# Patient Record
Sex: FEMALE | Race: BLACK OR AFRICAN AMERICAN | NOT HISPANIC OR LATINO | Employment: PART TIME | URBAN - NONMETROPOLITAN AREA
[De-identification: names, ages, dates, MRNs, and addresses within clinical notes are randomized per-mention and may not be internally consistent; named-entity substitution may affect disease eponyms.]

---

## 2017-01-05 ENCOUNTER — OFFICE VISIT (OUTPATIENT)
Dept: BARIATRICS/WEIGHT MGMT | Facility: CLINIC | Age: 67
End: 2017-01-05

## 2017-01-05 ENCOUNTER — NUTRITION (OUTPATIENT)
Dept: BARIATRICS/WEIGHT MGMT | Facility: HOSPITAL | Age: 67
End: 2017-01-05

## 2017-01-05 VITALS
TEMPERATURE: 98.6 F | SYSTOLIC BLOOD PRESSURE: 200 MMHG | OXYGEN SATURATION: 99 % | HEIGHT: 67 IN | RESPIRATION RATE: 20 BRPM | HEART RATE: 83 BPM | WEIGHT: 235.4 LBS | BODY MASS INDEX: 36.95 KG/M2 | DIASTOLIC BLOOD PRESSURE: 103 MMHG

## 2017-01-05 DIAGNOSIS — E55.9 VITAMIN D DEFICIENCY: ICD-10-CM

## 2017-01-05 DIAGNOSIS — Z98.84 HISTORY OF ROUX-EN-Y GASTRIC BYPASS: ICD-10-CM

## 2017-01-05 DIAGNOSIS — I10 HYPERTENSION, UNCONTROLLED: ICD-10-CM

## 2017-01-05 DIAGNOSIS — E66.01 MORBID OBESITY DUE TO EXCESS CALORIES (HCC): Primary | ICD-10-CM

## 2017-01-05 PROCEDURE — 99213 OFFICE O/P EST LOW 20 MIN: CPT | Performed by: NURSE PRACTITIONER

## 2017-01-05 PROCEDURE — 97803 MED NUTRITION INDIV SUBSEQ: CPT

## 2017-01-05 RX ORDER — MULTIVIT-MINERALS/FOLIC ACID 150 MCG
TABLET,CHEWABLE ORAL DAILY
Status: ON HOLD | COMMUNITY
End: 2020-02-26

## 2017-01-05 RX ORDER — DULOXETIN HYDROCHLORIDE 30 MG/1
30 CAPSULE, DELAYED RELEASE ORAL DAILY
COMMUNITY
End: 2017-10-12 | Stop reason: ALTCHOICE

## 2017-01-05 RX ORDER — CYANOCOBALAMIN 1000 UG/ML
1000 INJECTION, SOLUTION INTRAMUSCULAR; SUBCUTANEOUS
COMMUNITY

## 2017-01-05 NOTE — PATIENT INSTRUCTIONS
Patient is not getting enough protein per day and is not eating enough per day.   Will see the dietician today do discuss goals to remedy this.   See back in 6 weeks for recheck.   Vitamin labs will be due in August of 2017. However, Vitamin D level is due now. Order provided.   Goals: increase protein to 65 grams per day; increase meals to 4-5 small meals per day.   Patient will follow up in 6 weeks and will call the office if needs anything prior to that appointment.   Discuss uncontrolled B/P with PCP later this week.

## 2017-01-05 NOTE — PROGRESS NOTES
"Subjective   Veronica Stewart is a 66 y.o. female.     History of Present Illness Veronica Stewart is post RYGB bariatric surgery from 2002. Patient is able to get 64 ounces of water in per day. Patient is getting less than 30 grams of protein in per day. She is only eating once to twice per day. She will occasionally drink a protein shake. Patient is taking a multiple vitamin per day. Patient is taking Calcium with vitamin D supplement 2-3 times daily. Patient is getting Vitamin B12 injections monthly. Patient does not have any complaints of reflux, dysphagia, N/V, or abdominal pain. She is having some \"bloating\" when she eats. She has not tried any Gas-X to see if that would help.  Patient is on PPI and carafate due to to marginal ulcer in remote past.  Her blood pressure is elevated again today. She is due to see her PCP in a few days and will discuss this with her. She has refused to go to the ER for evaluation of this and states that she has to go to work after leaving here.     The following portions of the patient's history were reviewed and updated as appropriate: allergies, current medications, past family history, past medical history, past social history, past surgical history and problem list.    Review of Systems   Constitutional: Positive for fatigue. Negative for activity change and appetite change.   HENT: Negative.    Eyes: Positive for visual disturbance.        Blurred vision (mild); patient states chronic   Respiratory: Negative.  Negative for apnea, cough, chest tightness and shortness of breath.    Cardiovascular: Negative.  Negative for chest pain, palpitations and leg swelling.   Gastrointestinal: Negative.  Negative for abdominal pain, anal bleeding, constipation, nausea and vomiting.   Endocrine: Negative.    Genitourinary: Negative.    Musculoskeletal: Positive for arthralgias and myalgias. Negative for back pain.   Skin: Negative.    Allergic/Immunologic: Negative.    Neurological: Negative.  " Negative for seizures and syncope.   Hematological: Negative.  Does not bruise/bleed easily.   Psychiatric/Behavioral: Negative.  Negative for dysphoric mood, self-injury and sleep disturbance.       Objective   Physical Exam   Constitutional: She is oriented to person, place, and time. Vital signs are normal. She appears well-developed and well-nourished. She is cooperative. No distress.   HENT:   Head: Normocephalic and atraumatic.   Nose: Nose normal.   Mouth/Throat: Oropharynx is clear and moist. No oropharyngeal exudate or tonsillar abscesses.   Eyes: Conjunctivae, EOM and lids are normal. Pupils are equal, round, and reactive to light. Right eye exhibits no discharge. Left eye exhibits no discharge.   Glasses noted   Neck: Trachea normal. Neck supple. No JVD present. Carotid bruit is not present. No rigidity. No tracheal deviation present. No thyromegaly present.   Cardiovascular: Normal rate, regular rhythm, S1 normal, S2 normal and normal heart sounds.    Pulmonary/Chest: Effort normal and breath sounds normal. No stridor. No respiratory distress. She has no wheezes. She has no rales.   Abdominal: Soft. Bowel sounds are normal. She exhibits no distension. There is no tenderness.   obese   Musculoskeletal: She exhibits edema.        Right shoulder: She exhibits normal strength.   2+ edema noted bilateral lower legs   Lymphadenopathy:     She has no cervical adenopathy.   Neurological: She is alert and oriented to person, place, and time. She has normal strength. No cranial nerve deficit.   Skin: Skin is warm, dry and intact. No rash noted.   Psychiatric: She has a normal mood and affect. Her speech is normal and behavior is normal.   Alert and oriented x 3   Vitals reviewed.      Assessment/Plan   Veronica was seen today for weight loss and obesity.    Diagnoses and all orders for this visit:    Morbid obesity due to excess calories    History of Yamilex-en-Y gastric bypass    Body mass index 36.0-36.9,  adult    Hypertension, uncontrolled    Patient is not getting enough protein per day and is not eating enough per day.   Will see the dietician today do discuss goals to remedy this.   See back in 6 weeks for recheck.   Vitamin labs will be due in August of 2017. However, Vitamin D level is due now. Order provided.   Goals: increase protein to 65 grams per day; increase meals to 4-5 small meals per day.   Patient will follow up in 6 weeks and will call the office if needs anything prior to that appointment.   Discuss uncontrolled B/P with PCP later this week.

## 2017-01-05 NOTE — MR AVS SNAPSHOT
Veronica Stewart   1/5/2017 9:00 AM   Nutrition    Dept Phone:  [phone]   Encounter #:  [id number]    Provider:  [facility] BARIATRIC RESOURCE   Department:  [facility] BARIATRIC                Your Full Care Plan              Your Updated Medication List          This list is accurate as of: 1/5/17  9:46 AM.  Always use your most recent med list.                acyclovir 400 MG tablet   Commonly known as:  ZOVIRAX       ADVAIR DISKUS 250-50 MCG/DOSE DISKUS   Generic drug:  fluticasone-salmeterol       * albuterol 108 (90 BASE) MCG/ACT inhaler   Commonly known as:  PROVENTIL HFA;VENTOLIN HFA       * PROAIR  (90 BASE) MCG/ACT inhaler   Generic drug:  albuterol       aspirin 81 MG EC tablet       bumetanide 2 MG tablet   Commonly known as:  BUMEX       calcium-vitamin D 500-200 MG-UNIT per tablet   Commonly known as:  OSCAL-500       CARAFATE 1 GM/10ML suspension   Generic drug:  sucralfate       CENTRUM MULTIGUMMIES chewable tablet       clobetasol 0.05 % cream   Commonly known as:  TEMOVATE       cyanocobalamin 1000 MCG/ML injection       DEBROX 6.5 % otic solution   Generic drug:  carbamide peroxide       DULoxetine 30 MG capsule   Commonly known as:  CYMBALTA       ergocalciferol 63620 UNITS capsule   Commonly known as:  ERGOCALCIFEROL   Take 1 capsule by mouth 1 (one) time per week.       gabapentin 300 MG capsule   Commonly known as:  NEURONTIN       hydrALAZINE 50 MG tablet   Commonly known as:  APRESOLINE       levothyroxine 100 MCG tablet   Commonly known as:  SYNTHROID, LEVOTHROID       LORazepam 0.5 MG tablet   Commonly known as:  ATIVAN       metFORMIN 500 MG tablet   Commonly known as:  GLUCOPHAGE       misoprostol 200 MCG tablet   Commonly known as:  CYTOTEC       mometasone 50 MCG/ACT nasal spray   Commonly known as:  NASONEX       pantoprazole 40 MG EC tablet   Commonly known as:  PROTONIX       potassium chloride 10 MEQ CR capsule   Commonly known as:  MICRO-K       pravastatin 40 MG tablet   Commonly known as:  PRAVACHOL       * warfarin 10 MG tablet   Commonly known as:  COUMADIN       * warfarin 5 MG tablet   Commonly known as:  COUMADIN       * Notice:  This list has 4 medication(s) that are the same as other medications prescribed for you. Read the directions carefully, and ask your doctor or other care provider to review them with you.            Instructions     None    Patient Instructions History      Upcoming Appointments     Visit Type Date Time Department    OFFICE VISIT 1/5/2017  9:00 AM [facility]G PAD    NUTRITION COUNSELING 1/5/2017  9:00 AM [facility] BARIATRIC    NURSE/MA VISIT 1/9/2017  8:00 AM [facility]ART GROUP PAD    OFFICE VISIT 2/16/2017 11:30 AM [facility] BAR SURG PAD      MyChart Signup     Our records indicate that you have an active Club Venit account.    You can view your After Visit Summary by going to Syncapse and logging in with your Deepclass username and password.  If you don't have a Deepclass username and password but a parent or guardian has access to your record, the parent or guardian should login with their own Deepclass username and password and access your record to view the After Visit Summary.    If you have questions, you can email [email or web address] or call [phone] to talk to our Deepclass staff.  Remember, Deepclass is NOT to be used for urgent needs.  For medical emergencies, dial 911.               Other Info from Your Visit           Your Appointments     Jan 09, 2017  8:00 AM CST   Nurse Visit with PACEMAKER HEART GRP CARELINK   [facility] HEART GROUP (--)    [address] [place] [address] [address]  [facility] [id number] [phone]            Feb 16, 2017 11:30 AM CST   Office Visit with CATRACHITO Hall   [facility] GENERAL SURGERY (--)    [address]  [facility] [address] [phone]           Arrive 15 minutes prior to appointment.              Allergies      Bee Venom      Other  GI Intolerance    Opioids-[facility], patient started sweating and vomiting      Percocet [Oxycodone-acetaminophen]  GI Intolerance    Sweating and vomiting     Ultram [Tramadol Hcl]  GI Intolerance    Sweating and vomiting       Vital Signs     Smoking Status                   Never Smoker

## 2017-01-05 NOTE — MR AVS SNAPSHOT
Veronica Stewart   1/5/2017 9:00 AM   Office Visit    Dept Phone:  [phone]   Encounter #:  [id number]    Provider:  CATRACHITO Hall   Department:  [facility] GENERAL SURGERY                Your Full Care Plan              Your Updated Medication List          This list is accurate as of: 1/5/17  9:43 AM.  Always use your most recent med list.                acyclovir 400 MG tablet   Commonly known as:  ZOVIRAX       ADVAIR DISKUS 250-50 MCG/DOSE DISKUS   Generic drug:  fluticasone-salmeterol       * albuterol 108 (90 BASE) MCG/ACT inhaler   Commonly known as:  PROVENTIL HFA;VENTOLIN HFA       * PROAIR  (90 BASE) MCG/ACT inhaler   Generic drug:  albuterol       aspirin 81 MG EC tablet       bumetanide 2 MG tablet   Commonly known as:  BUMEX       calcium-vitamin D 500-200 MG-UNIT per tablet   Commonly known as:  OSCAL-500       CARAFATE 1 GM/10ML suspension   Generic drug:  sucralfate       CENTRUM MULTIGUMMIES chewable tablet       clobetasol 0.05 % cream   Commonly known as:  TEMOVATE       cyanocobalamin 1000 MCG/ML injection       DEBROX 6.5 % otic solution   Generic drug:  carbamide peroxide       DULoxetine 30 MG capsule   Commonly known as:  CYMBALTA       ergocalciferol 04967 UNITS capsule   Commonly known as:  ERGOCALCIFEROL   Take 1 capsule by mouth 1 (one) time per week.       gabapentin 300 MG capsule   Commonly known as:  NEURONTIN       hydrALAZINE 50 MG tablet   Commonly known as:  APRESOLINE       levothyroxine 100 MCG tablet   Commonly known as:  SYNTHROID, LEVOTHROID       LORazepam 0.5 MG tablet   Commonly known as:  ATIVAN       metFORMIN 500 MG tablet   Commonly known as:  GLUCOPHAGE       misoprostol 200 MCG tablet   Commonly known as:  CYTOTEC       mometasone 50 MCG/ACT nasal spray   Commonly known as:  NASONEX       pantoprazole 40 MG EC tablet   Commonly known as:  PROTONIX       potassium chloride 10 MEQ CR capsule   Commonly known as:   MICRO-K       pravastatin 40 MG tablet   Commonly known as:  PRAVACHOL       * warfarin 10 MG tablet   Commonly known as:  COUMADIN       * warfarin 5 MG tablet   Commonly known as:  COUMADIN       * Notice:  This list has 4 medication(s) that are the same as other medications prescribed for you. Read the directions carefully, and ask your doctor or other care provider to review them with you.            We Performed the Following     Vitamin D 25 Hydroxy       You Were Diagnosed With        Codes Comments    Morbid obesity due to excess calories    -  Primary ICD-10-CM: E66.01  ICD-9-CM: 278.01     History of Yamilex-en-Y gastric bypass     ICD-10-CM: Z98.84  ICD-9-CM: V45.86     Body mass index 36.0-36.9, adult     ICD-10-CM: Z68.36  ICD-9-CM: V85.36     Hypertension, uncontrolled     ICD-10-CM: I10  ICD-9-CM: 401.9     Vitamin D deficiency     ICD-10-CM: E55.9  ICD-9-CM: 268.9       Instructions    Patient is not getting enough protein per day and is not eating enough per day.   Will see the dietician today do discuss goals to remedy this.   See back in 6 weeks for recheck.   Vitamin labs will be due in August of 2017. However, Vitamin D level is due now. Order provided.   Goals: increase protein to 65 grams per day; increase meals to 4-5 small meals per day.   Patient will follow up in 6 weeks and will call the office if needs anything prior to that appointment.   Discuss uncontrolled B/P with PCP later this week.      Patient Instructions History      Upcoming Appointments     Visit Type Date Time Department    OFFICE VISIT 1/5/2017  9:00 AM [facility] BAR SURG PAD    NUTRITION COUNSELING 1/5/2017  9:00 AM [facility] BARIATRIC    NURSE/MA VISIT 1/9/2017  8:00 AM [facility] HEART GROUP PAD      MyChart Signup     Our records indicate that you have an active Flossonic account.    You can view your After Visit Summary by going to Eloqua and logging in with your BOND username and password.  If you  "don't have a Nukotoys username and password but a parent or guardian has access to your record, the parent or guardian should login with their own Nukotoys username and password and access your record to view the After Visit Summary.    If you have questions, you can email [email or web address] or call [phone] to talk to our Nukotoys staff.  Remember, Nukotoys is NOT to be used for urgent needs.  For medical emergencies, dial 911.               Other Info from Your Visit           Your Appointments     Jan 09, 2017  8:00 AM CST   Nurse Visit with PACEMAKER HEART GRP [facility] HEART GROUP (--)    [address]  [facility] [address] [phone]              Allergies     Bee Venom      Other  GI Intolerance    Opio[facility], patient started sweating and vomiting      Percocet [Oxycodone-acetaminophen]  GI Intolerance    Sweating and vomiting     Ultram [Tramadol Hcl]  GI Intolerance    Sweating and vomiting       Reason for Visit     Weight Loss Previous Bypass (2002) with Dr [facility]     Obesity           Vital Signs     Blood Pressure Pulse Temperature Respirations Height Weight    200/103 (BP Location: Left arm, Patient Position: Sitting, Cuff Size: Adult) 83 98.6 °F (37 °C) 20 67\" (170.2 cm) 235 lb 6.4 oz (107 kg)    Oxygen Saturation Body Mass Index Smoking Status             99% 36.87 kg/m2 Never Smoker         Problems and Diagnoses Noted     Body mass index 36.0-36.9, adult    History of Yamilex-en-Y gastric bypass    Hypertension, uncontrolled    Severe obesity    Vitamin D deficiency        "

## 2017-01-05 NOTE — PROGRESS NOTES
"Nutrition Bariatric/MWL Note     Visit   Previous Sx    Anthropometrics   Height: 67\"  Weight: 235#  BMI: 36.9  Gained: 3#    Nutrition Recall  24 Hour recall:  (B) skip (L)skip or soup or sandwich, sweet tea (D) baked meat, vegetables  Eating 1-2 meals daily   Snacking in evening on buttered popcorn  Limited sweet intake  Monitoring portions  Drinking with meals   Drinking less than 64 fluid ounces    Exercise   None    Habits:  None    Education    Reinforce Nutritional Needs for Surgery    Nutrition Goals   Eat 3-4 meals per day with protein  Eat protein first at meals  Protein goal: 65gms or 80 gms per day   Increase fluid intake to 64 ounces per day  Drink between meals only. Stop 30 minutes before and start 45 minutes after. Sip only with meals    Exercise Goals  Add 15-30 minutes of walking, cycling, elliptical, swimming, chair, yoga, stretch band exercises or crossfit daily    Ayana King RD, LD  01/05/2017  9:26 AM    "

## 2017-01-16 ENCOUNTER — OFFICE VISIT (OUTPATIENT)
Dept: CARDIOLOGY | Facility: CLINIC | Age: 67
End: 2017-01-16

## 2017-01-16 DIAGNOSIS — I49.5 SA NODE DYSFUNCTION (HCC): ICD-10-CM

## 2017-01-16 DIAGNOSIS — Z95.0 CARDIAC PACEMAKER IN SITU: Primary | ICD-10-CM

## 2017-01-16 PROCEDURE — 93294 REM INTERROG EVL PM/LDLS PM: CPT | Performed by: INTERNAL MEDICINE

## 2017-01-16 PROCEDURE — 93296 REM INTERROG EVL PM/IDS: CPT | Performed by: INTERNAL MEDICINE

## 2017-01-16 NOTE — PROGRESS NOTES
Dual Chamber Pacemaker Evaluation Report  [facility]    January 16, 2017    Primary Cardiologist: Mitzi  : Medtronic Model: EnRhythm  Implant date: November 13, 2009    Reason for evaluation:routine  Indication for pacemaker: tachy redd syndrome and sinus node dysfunction    Measurements  Atrial sensing - P wave: 2.5 mV  Atrial threshold: n/r  Atrial lead impedance: 512 ohms  Ventricular sensing - R wave: 4.4 mV  Ventricular threshold: n/r  Ventricular lead impedance:   536 ohms     Diagnostic Data  Atrial paced: 88.3 %  Ventricular paced: 1.2 %  Other: 3 SVT episodes- longest 5 seconds, max v rate 200 bpm   meds include coumadin  Battery status: intensify follow up   2.93V      Final Parameters  Mode:  AAIR+  Lower rate: 60 bpm   Upper rate: 130 bpm  AV Delay: paced- 180 ms  Sensed-150 ms  Atrial - Amplitude: 2 V   Pulse width: 0.4 ms   Sensitivity: 0.6 mV     Ventricular - Amplitude: 3 V  Pulse width: 0.4 ms  Sensitivity: 0.6 mV    Changes made: n/a  Conclusions: normal pacemaker function and stable sensing thresholds    Follow up: 2 months

## 2017-01-16 NOTE — MR AVS SNAPSHOT
Veronica Stewart   1/16/2017 11:30 AM   Office Visit    Dept Phone:  [phone]   Encounter #:  [id number]    Provider:  PACEMAKER HEART GRP CARELINK   Department:  [facility] HEART GROUP                Your Full Care Plan              Your Updated Medication List          This list is accurate as of: 1/16/17  1:29 PM.  Always use your most recent med list.                acyclovir 400 MG tablet   Commonly known as:  ZOVIRAX       ADVAIR DISKUS 250-50 MCG/DOSE DISKUS   Generic drug:  fluticasone-salmeterol       * albuterol 108 (90 BASE) MCG/ACT inhaler   Commonly known as:  PROVENTIL HFA;VENTOLIN HFA       * PROAIR  (90 BASE) MCG/ACT inhaler   Generic drug:  albuterol       aspirin 81 MG EC tablet       bumetanide 2 MG tablet   Commonly known as:  BUMEX       calcium-vitamin D 500-200 MG-UNIT per tablet   Commonly known as:  OSCAL-500       CARAFATE 1 GM/10ML suspension   Generic drug:  sucralfate       CENTRUM MULTIGUMMIES chewable tablet       clobetasol 0.05 % cream   Commonly known as:  TEMOVATE       cyanocobalamin 1000 MCG/ML injection       DEBROX 6.5 % otic solution   Generic drug:  carbamide peroxide       DULoxetine 30 MG capsule   Commonly known as:  CYMBALTA       ergocalciferol 44899 UNITS capsule   Commonly known as:  ERGOCALCIFEROL   Take 1 capsule by mouth 1 (one) time per week.       gabapentin 300 MG capsule   Commonly known as:  NEURONTIN       hydrALAZINE 50 MG tablet   Commonly known as:  APRESOLINE       levothyroxine 100 MCG tablet   Commonly known as:  SYNTHROID, LEVOTHROID       LORazepam 0.5 MG tablet   Commonly known as:  ATIVAN       metFORMIN 500 MG tablet   Commonly known as:  GLUCOPHAGE       misoprostol 200 MCG tablet   Commonly known as:  CYTOTEC       mometasone 50 MCG/ACT nasal spray   Commonly known as:  NASONEX       pantoprazole 40 MG EC tablet   Commonly known as:  PROTONIX       potassium chloride 10 MEQ CR capsule   Commonly  known as:  MICRO-K       pravastatin 40 MG tablet   Commonly known as:  PRAVACHOL       * warfarin 10 MG tablet   Commonly known as:  COUMADIN       * warfarin 5 MG tablet   Commonly known as:  COUMADIN       * Notice:  This list has 4 medication(s) that are the same as other medications prescribed for you. Read the directions carefully, and ask your doctor or other care provider to review them with you.            You Were Diagnosed With        Codes Comments    Cardiac pacemaker in situ    -  Primary ICD-10-CM: Z95.0  ICD-9-CM: V45.01     SA node dysfunction     ICD-10-CM: I49.5  ICD-9-CM: 427.81       Instructions     None    Patient Instructions History      Upcoming Appointments     Visit Type Date Time Department    PACEMAKER CHECK 1/16/2017 11:30 AM [facility] HEART [facility] PAD    OFFICE VISIT 2/16/2017 11:30 AM [facility] BAR SURG PAD    NUTRITION COUNSELING 2/16/2017 11:30 AM  PAD BARIATRIC    PACEMAKER CHECK 3/15/2017  8:30 AM [facility] PAD    FOLLOW UP 4/10/2017  8:30 AM [facility] PAD      MyChart Signup     Our records indicate that you have an active Latter-dayAditive account.    You can view your After Visit Summary by going to Droidhen and logging in with your Gander Mountain username and password.  If you don't have a Gander Mountain username and password but a parent or guardian has access to your record, the parent or guardian should login with their own Gander Mountain username and password and access your record to view the After Visit Summary.    If you have questions, you can email [email or web address] or call [phone] to talk to our Gander Mountain staff.  Remember, Gander Mountain is NOT to be used for urgent needs.  For medical emergencies, dial 911.               Other Info from Your Visit           Your Appointments     Feb 16, 2017 11:30 AM CST   Office Visit with CATRACHITO Hall   [facility] GENERAL SURGERY (--)    [address] [place] [address] [address]  [facility] [address]    [phone]           Arrive 15 minutes prior to appointment.            Feb 16, 2017 11:30 AM CST   Nutrition Counseling with PAD BARIATRIC RESOURCE   [facility] BARIATRIC ([place])    [address]  [facility] [id number] [phone]            Mar 15, 2017  8:30 AM CDT   PACEMAKER CHECK with PACEMAKER HEART GRP CARELINK   [facility] HEART GROUP (--)    [address]  [facility] [address] [phone]            Apr 10, 2017  8:30 AM CDT   Follow Up with Ronny Lowe MD   [facility] HEART GROUP (--)    [address]  [facility] [address] [phone]           Arrive 15 minutes prior to appointment.              Allergies     Bee Venom      Other  GI Intolerance    Opio[facility], patient started sweating and vomiting      Percocet [Oxycodone-acetaminophen]  GI Intolerance    Sweating and vomiting     Ultram [Tramadol Hcl]  GI Intolerance    Sweating and vomiting       Vital Signs     Smoking Status                   Never Smoker           Problems and Diagnoses Noted     Pacemaker    -  Primary    Irregular heartbeat

## 2017-01-20 ENCOUNTER — OFFICE VISIT (OUTPATIENT)
Dept: URGENT CARE | Age: 67
End: 2017-01-20
Payer: MEDICARE

## 2017-01-20 VITALS
DIASTOLIC BLOOD PRESSURE: 72 MMHG | BODY MASS INDEX: 37.04 KG/M2 | HEART RATE: 70 BPM | HEIGHT: 67 IN | RESPIRATION RATE: 18 BRPM | TEMPERATURE: 98.4 F | OXYGEN SATURATION: 99 % | WEIGHT: 236 LBS | SYSTOLIC BLOOD PRESSURE: 122 MMHG

## 2017-01-20 DIAGNOSIS — R52 BODY ACHES: ICD-10-CM

## 2017-01-20 DIAGNOSIS — J34.89 SINUS PRESSURE: ICD-10-CM

## 2017-01-20 DIAGNOSIS — J06.9 VIRAL URI WITH COUGH: Primary | ICD-10-CM

## 2017-01-20 LAB
INFLUENZA A ANTIBODY: NEGATIVE
INFLUENZA B ANTIBODY: NEGATIVE

## 2017-01-20 PROCEDURE — 3014F SCREEN MAMMO DOC REV: CPT | Performed by: NURSE PRACTITIONER

## 2017-01-20 PROCEDURE — 99213 OFFICE O/P EST LOW 20 MIN: CPT | Performed by: NURSE PRACTITIONER

## 2017-01-20 PROCEDURE — G8419 CALC BMI OUT NRM PARAM NOF/U: HCPCS | Performed by: NURSE PRACTITIONER

## 2017-01-20 PROCEDURE — 87804 INFLUENZA ASSAY W/OPTIC: CPT | Performed by: NURSE PRACTITIONER

## 2017-01-20 PROCEDURE — G8400 PT W/DXA NO RESULTS DOC: HCPCS | Performed by: NURSE PRACTITIONER

## 2017-01-20 PROCEDURE — 1123F ACP DISCUSS/DSCN MKR DOCD: CPT | Performed by: NURSE PRACTITIONER

## 2017-01-20 PROCEDURE — 4040F PNEUMOC VAC/ADMIN/RCVD: CPT | Performed by: NURSE PRACTITIONER

## 2017-01-20 PROCEDURE — 3017F COLORECTAL CA SCREEN DOC REV: CPT | Performed by: NURSE PRACTITIONER

## 2017-01-20 PROCEDURE — 1090F PRES/ABSN URINE INCON ASSESS: CPT | Performed by: NURSE PRACTITIONER

## 2017-01-20 PROCEDURE — 1036F TOBACCO NON-USER: CPT | Performed by: NURSE PRACTITIONER

## 2017-01-20 PROCEDURE — G8427 DOCREV CUR MEDS BY ELIG CLIN: HCPCS | Performed by: NURSE PRACTITIONER

## 2017-01-20 PROCEDURE — G8484 FLU IMMUNIZE NO ADMIN: HCPCS | Performed by: NURSE PRACTITIONER

## 2017-01-20 RX ORDER — ACETAMINOPHEN 500 MG
500 TABLET ORAL EVERY 4 HOURS PRN
Qty: 120 TABLET | Refills: 0 | Status: ON HOLD | OUTPATIENT
Start: 2017-01-20 | End: 2017-03-22 | Stop reason: ALTCHOICE

## 2017-01-20 RX ORDER — BENZONATATE 100 MG/1
100 CAPSULE ORAL 3 TIMES DAILY PRN
Qty: 21 CAPSULE | Refills: 0 | Status: SHIPPED | OUTPATIENT
Start: 2017-01-20 | End: 2017-01-27

## 2017-01-20 RX ORDER — METHYLPREDNISOLONE 4 MG/1
TABLET ORAL
Qty: 1 KIT | Refills: 0 | Status: SHIPPED | OUTPATIENT
Start: 2017-01-20 | End: 2017-01-26

## 2017-01-20 RX ORDER — DEXTROMETHORPHAN HYDROBROMIDE AND PROMETHAZINE HYDROCHLORIDE 15; 6.25 MG/5ML; MG/5ML
5 SYRUP ORAL NIGHTLY PRN
Qty: 118 ML | Refills: 0 | Status: SHIPPED | OUTPATIENT
Start: 2017-01-20 | End: 2017-01-27

## 2017-01-20 ASSESSMENT — ENCOUNTER SYMPTOMS
COUGH: 1
SHORTNESS OF BREATH: 0
SINUS PRESSURE: 1
SORE THROAT: 1
RHINORRHEA: 1

## 2017-01-27 ENCOUNTER — RESULTS ENCOUNTER (OUTPATIENT)
Dept: BARIATRICS/WEIGHT MGMT | Facility: CLINIC | Age: 67
End: 2017-01-27

## 2017-01-27 DIAGNOSIS — Z98.84 HISTORY OF ROUX-EN-Y GASTRIC BYPASS: ICD-10-CM

## 2017-01-27 DIAGNOSIS — E66.01 MORBID OBESITY DUE TO EXCESS CALORIES (HCC): ICD-10-CM

## 2017-01-27 DIAGNOSIS — E55.9 VITAMIN D DEFICIENCY: ICD-10-CM

## 2017-01-27 DIAGNOSIS — I10 HYPERTENSION, UNCONTROLLED: ICD-10-CM

## 2017-02-16 ENCOUNTER — OFFICE VISIT (OUTPATIENT)
Dept: BARIATRICS/WEIGHT MGMT | Facility: CLINIC | Age: 67
End: 2017-02-16

## 2017-02-16 ENCOUNTER — APPOINTMENT (OUTPATIENT)
Dept: BARIATRICS/WEIGHT MGMT | Facility: HOSPITAL | Age: 67
End: 2017-02-16

## 2017-02-16 VITALS
SYSTOLIC BLOOD PRESSURE: 191 MMHG | WEIGHT: 235.4 LBS | RESPIRATION RATE: 20 BRPM | TEMPERATURE: 97.7 F | HEART RATE: 71 BPM | HEIGHT: 67 IN | DIASTOLIC BLOOD PRESSURE: 102 MMHG | BODY MASS INDEX: 36.95 KG/M2 | OXYGEN SATURATION: 100 %

## 2017-02-16 DIAGNOSIS — Z98.84 HISTORY OF ROUX-EN-Y GASTRIC BYPASS: ICD-10-CM

## 2017-02-16 DIAGNOSIS — E66.01 MORBID OBESITY DUE TO EXCESS CALORIES (HCC): Primary | ICD-10-CM

## 2017-02-16 DIAGNOSIS — R60.9 PERIPHERAL EDEMA: ICD-10-CM

## 2017-02-16 DIAGNOSIS — I10 HYPERTENSION, UNCONTROLLED: ICD-10-CM

## 2017-02-16 PROCEDURE — 99213 OFFICE O/P EST LOW 20 MIN: CPT | Performed by: NURSE PRACTITIONER

## 2017-02-16 RX ORDER — METHYLPREDNISOLONE ACETATE 80 MG/ML
80 INJECTION, SUSPENSION INTRA-ARTICULAR; INTRALESIONAL; INTRAMUSCULAR; SOFT TISSUE ONCE
COMMUNITY
End: 2017-06-15 | Stop reason: ALTCHOICE

## 2017-02-16 RX ORDER — PARICALCITOL 1 UG/1
1 CAPSULE, LIQUID FILLED ORAL DAILY
COMMUNITY
End: 2017-10-12

## 2017-02-16 NOTE — PROGRESS NOTES
Subjective   Veronica Stewart is a 66 y.o. female.     History of Present Illness Veronica Stewart is post RYGB bariatric surgery from 2002. Patient is able to get 60 ounces of sweet tea in per day. Patient is getting less than 30 grams of protein in per day. She is only eating once to twice per day. She will occasionally drink a protein shake. Patient's granddaughter is with her today and has stated that patient is not taking any of her medications and has not been since late part of last year. Patient is no longer doing her Vitamin B12 injections. Patient does not have any complaints of reflux, dysphagia, N/V. She does have some abdominal pain in her stomach area, but there again, she is not taking her PPI. She has had a marginal ulcer in the past. Her blood pressure is elevated again today. Her blood pressure is elevated, again today, and she has not been taking her blood pressure medication. She has refused to go to the ER for evaluation of this and states that she has to go to work after leaving here.        The following portions of the patient's history were reviewed and updated as appropriate: allergies, current medications, past family history, past medical history, past social history, past surgical history and problem list.    Review of Systems   Constitutional: Negative for activity change, appetite change and fatigue.   HENT: Negative.    Eyes: Negative.    Respiratory: Negative.  Negative for apnea, cough, chest tightness and shortness of breath.    Cardiovascular: Positive for leg swelling. Negative for chest pain and palpitations.        Elevated blood pressure   Gastrointestinal: Positive for abdominal pain. Negative for anal bleeding, constipation, nausea and vomiting.   Endocrine: Negative.    Genitourinary: Negative.    Musculoskeletal: Negative.  Negative for arthralgias and back pain.   Skin: Negative.    Allergic/Immunologic: Negative.    Neurological: Negative.  Negative for seizures and syncope.    Hematological: Negative.  Does not bruise/bleed easily.   Psychiatric/Behavioral: Positive for dysphoric mood. Negative for self-injury and sleep disturbance.       Objective   Physical Exam   Constitutional: She is oriented to person, place, and time. Vital signs are normal. She appears well-developed and well-nourished. She is cooperative. No distress.   HENT:   Head: Normocephalic and atraumatic.   Nose: Nose normal.   Mouth/Throat: Oropharynx is clear and moist. No oropharyngeal exudate or tonsillar abscesses.   Eyes: Conjunctivae, EOM and lids are normal. Pupils are equal, round, and reactive to light. Right eye exhibits no discharge. Left eye exhibits no discharge.   Glasses noted   Neck: Trachea normal. Neck supple. No JVD present. Carotid bruit is not present. No rigidity. No tracheal deviation present. No thyromegaly present.   Cardiovascular: Normal rate, regular rhythm, S1 normal, S2 normal and normal heart sounds.    Pulmonary/Chest: Effort normal and breath sounds normal. No stridor. No respiratory distress. She has no wheezes. She has no rales.   Abdominal: Soft. Bowel sounds are normal. She exhibits no distension. There is tenderness.   Obese; epigastric discomfort intermittently   Musculoskeletal: She exhibits edema.        Right shoulder: She exhibits normal strength.   3+ pitting edema to bilateral lower legs   Lymphadenopathy:     She has no cervical adenopathy.   Neurological: She is alert and oriented to person, place, and time. She has normal strength. No cranial nerve deficit.   Skin: Skin is warm, dry and intact. No rash noted.   Psychiatric: She has a normal mood and affect. Her speech is normal and behavior is normal.   Alert and oriented x 3   Vitals reviewed.      Assessment/Plan   Veronica was seen today for obesity and weight loss.    Diagnoses and all orders for this visit:    Morbid obesity due to excess calories    Body mass index 36.0-36.9, adult    History of Yamilex-en-Y gastric  bypass    Hypertension, uncontrolled    Peripheral edema          Patient is not doing well. She has stopped taking all of her medications. She has been advised that this is a dangerous decision and that she is ultimately harming herself. Patient advised to start all medications back as directed per her PCP and cardiologist.   Patient advised to seek counseling either here with DR. Rubio or at [facility] (where she works).   Vitamin D level was not done at last visit.  Do not drink sweet tea and eat more protein.   Patient will follow up in 3months and will call the office if needs anything prior to that appointment.

## 2017-02-16 NOTE — PATIENT INSTRUCTIONS
Patient is not doing well. She has stopped taking all of her medications. She has been advised that this is a dangerous decision and that she is ultimately harming herself. Patient advised to start all medications back as directed per her PCP and cardiologist.   Patient advised to seek counseling either here with DR. Rubio or at [facility] (where she works).   Vitamin D level was not done at last visit.  Do not drink sweet tea and eat more protein.   Patient will follow up in 3months and will call the office if needs anything prior to that appointment.

## 2017-02-24 ENCOUNTER — RESULTS ENCOUNTER (OUTPATIENT)
Dept: BARIATRICS/WEIGHT MGMT | Facility: CLINIC | Age: 67
End: 2017-02-24

## 2017-02-24 DIAGNOSIS — I10 HYPERTENSION, UNCONTROLLED: ICD-10-CM

## 2017-02-24 DIAGNOSIS — E55.9 VITAMIN D DEFICIENCY: ICD-10-CM

## 2017-02-24 DIAGNOSIS — Z98.84 HISTORY OF ROUX-EN-Y GASTRIC BYPASS: ICD-10-CM

## 2017-02-24 DIAGNOSIS — E66.01 MORBID OBESITY DUE TO EXCESS CALORIES (HCC): ICD-10-CM

## 2017-03-02 ENCOUNTER — CLINICAL SUPPORT (OUTPATIENT)
Dept: CARDIOLOGY | Facility: CLINIC | Age: 67
End: 2017-03-02

## 2017-03-02 DIAGNOSIS — I49.5 SA NODE DYSFUNCTION (HCC): ICD-10-CM

## 2017-03-02 DIAGNOSIS — I49.5 BRADY-TACHY SYNDROME (HCC): ICD-10-CM

## 2017-03-02 DIAGNOSIS — Z95.0 CARDIAC PACEMAKER IN SITU: Primary | ICD-10-CM

## 2017-03-02 NOTE — PROGRESS NOTES
Dual Chamber Pacemaker Evaluation Report  [facility]    March 2, 2017    Primary Cardiologist: Mynor  : Medtronic Model: EnRhythm  Implant date: November 13, 2009    Reason for evaluation:watching battery  Indication for pacemaker: sinus node dysfunction and tachy-redd syndrome    Measurements  Atrial sensing - P wave: 2.2 mV  Atrial threshold: n/r  Atrial lead impedance: 544 ohms  Ventricular sensing - R wave: 4.7 mV  Ventricular threshold: n/r  Ventricular lead impedance:   480 ohms     Diagnostic Data  Atrial paced: 85.1 %  Ventricular paced: 0.5 %  Other: 6 SVT episodes - longest 6 seconds, max v rate 188 bpm   3 episodes that appear to be NSVT episodes from the recorded rates but no EGM available to confirm  Battery status: intensify follow up   2.93V      Final Parameters  Mode:  AAIR+  Lower rate: 60 bpm   Upper rate: 130 bpm  AV Delay: paced- 180 ms  Sensed-150 ms  Atrial - Amplitude: 2 V   Pulse width: 0.4 ms   Sensitivity: 0.6 mV     Ventricular - Amplitude: 3 V  Pulse width: 0.4 ms  Sensitivity: 0.9 mV    Changes made: n/a  Conclusions: normal pacemaker function and stable pacing and sensing thresholds    Follow up: 2 months

## 2017-03-07 ENCOUNTER — HOSPITAL ENCOUNTER (EMERGENCY)
Age: 67
Discharge: HOME | End: 2017-03-07
Payer: MEDICARE

## 2017-03-07 VITALS
HEART RATE: 83 BPM | DIASTOLIC BLOOD PRESSURE: 112 MMHG | RESPIRATION RATE: 20 BRPM | OXYGEN SATURATION: 100 % | HEIGHT: 66 IN | SYSTOLIC BLOOD PRESSURE: 209 MMHG | TEMPERATURE: 97.2 F | WEIGHT: 235 LBS | BODY MASS INDEX: 37.77 KG/M2

## 2017-03-07 DIAGNOSIS — L03.113 CELLULITIS OF RIGHT UPPER ARM: Primary | ICD-10-CM

## 2017-03-07 PROCEDURE — 99282 EMERGENCY DEPT VISIT SF MDM: CPT | Performed by: NURSE PRACTITIONER

## 2017-03-07 PROCEDURE — 90715 TDAP VACCINE 7 YRS/> IM: CPT | Performed by: NURSE PRACTITIONER

## 2017-03-07 PROCEDURE — 99281 EMR DPT VST MAYX REQ PHY/QHP: CPT

## 2017-03-07 PROCEDURE — 90471 IMMUNIZATION ADMIN: CPT | Performed by: NURSE PRACTITIONER

## 2017-03-07 PROCEDURE — 6360000002 HC RX W HCPCS: Performed by: NURSE PRACTITIONER

## 2017-03-07 RX ORDER — CEPHALEXIN 500 MG/1
500 CAPSULE ORAL 3 TIMES DAILY
Qty: 21 CAPSULE | Refills: 0 | Status: SHIPPED | OUTPATIENT
Start: 2017-03-07 | End: 2017-03-14

## 2017-03-07 RX ADMIN — TETANUS TOXOID, REDUCED DIPHTHERIA TOXOID AND ACELLULAR PERTUSSIS VACCINE, ADSORBED 0.5 ML: 5; 2.5; 8; 8; 2.5 SUSPENSION INTRAMUSCULAR at 13:18

## 2017-03-07 ASSESSMENT — ENCOUNTER SYMPTOMS: RESPIRATORY NEGATIVE: 1

## 2017-03-07 ASSESSMENT — PAIN SCALES - GENERAL: PAINLEVEL_OUTOF10: 4

## 2017-03-07 ASSESSMENT — PAIN DESCRIPTION - ORIENTATION: ORIENTATION: RIGHT

## 2017-03-07 ASSESSMENT — PAIN DESCRIPTION - LOCATION: LOCATION: ARM

## 2017-03-21 ENCOUNTER — APPOINTMENT (OUTPATIENT)
Dept: GENERAL RADIOLOGY | Age: 67
End: 2017-03-21
Payer: MEDICARE

## 2017-03-21 ENCOUNTER — HOSPITAL ENCOUNTER (OUTPATIENT)
Age: 67
Setting detail: OBSERVATION
Discharge: HOME | End: 2017-03-22
Attending: EMERGENCY MEDICINE | Admitting: INTERNAL MEDICINE
Payer: MEDICARE

## 2017-03-21 DIAGNOSIS — I20.8 STABLE ANGINA (HCC): Primary | ICD-10-CM

## 2017-03-21 LAB
ALBUMIN SERPL-MCNC: 3.9 G/DL (ref 3.5–5.2)
ALP BLD-CCNC: 85 U/L (ref 35–104)
ALT SERPL-CCNC: 9 U/L (ref 5–33)
ANION GAP SERPL CALCULATED.3IONS-SCNC: 11 MMOL/L (ref 7–19)
AST SERPL-CCNC: 24 U/L (ref 5–32)
BASOPHILS ABSOLUTE: 0 K/UL (ref 0–0.2)
BASOPHILS RELATIVE PERCENT: 0.4 % (ref 0–1)
BILIRUB SERPL-MCNC: 0.6 MG/DL (ref 0.2–1.2)
BUN BLDV-MCNC: 19 MG/DL (ref 8–23)
CALCIUM SERPL-MCNC: 8.8 MG/DL (ref 8.8–10.2)
CHLORIDE BLD-SCNC: 101 MMOL/L (ref 98–111)
CO2: 27 MMOL/L (ref 22–29)
CREAT SERPL-MCNC: 1 MG/DL (ref 0.5–0.9)
EOSINOPHILS ABSOLUTE: 0 K/UL (ref 0–0.6)
EOSINOPHILS RELATIVE PERCENT: 0.8 % (ref 0–5)
GFR NON-AFRICAN AMERICAN: 55
GLOBULIN: 3.6 G/DL
GLUCOSE BLD-MCNC: 88 MG/DL (ref 74–109)
HCT VFR BLD CALC: 38.8 % (ref 37–47)
HEMOGLOBIN: 12.6 G/DL (ref 12–16)
LYMPHOCYTES ABSOLUTE: 1.7 K/UL (ref 1.1–4.5)
LYMPHOCYTES RELATIVE PERCENT: 35.1 % (ref 20–40)
MCH RBC QN AUTO: 29.2 PG (ref 27–31)
MCHC RBC AUTO-ENTMCNC: 32.5 G/DL (ref 33–37)
MCV RBC AUTO: 89.8 FL (ref 81–99)
MONOCYTES ABSOLUTE: 0.5 K/UL (ref 0–0.9)
MONOCYTES RELATIVE PERCENT: 10.9 % (ref 0–10)
NEUTROPHILS ABSOLUTE: 2.6 K/UL (ref 1.5–7.5)
NEUTROPHILS RELATIVE PERCENT: 52.8 % (ref 50–65)
PDW BLD-RTO: 15.5 % (ref 11.5–14.5)
PERFORMED ON: NORMAL
PERFORMED ON: NORMAL
PLATELET # BLD: 185 K/UL (ref 130–400)
PMV BLD AUTO: 12 FL (ref 7.4–10.4)
POC TROPONIN I: 0.01 NG/ML (ref 0–0.08)
POC TROPONIN I: 0.01 NG/ML (ref 0–0.08)
POTASSIUM SERPL-SCNC: 3.6 MMOL/L (ref 3.5–5)
PRO-BNP: 176 PG/ML (ref 0–900)
RBC # BLD: 4.32 M/UL (ref 4.2–5.4)
SODIUM BLD-SCNC: 139 MMOL/L (ref 136–145)
TOTAL PROTEIN: 7.5 G/DL (ref 6.6–8.7)
TROPONIN: <0.01 NG/ML (ref 0–0.03)
TROPONIN: <0.01 NG/ML (ref 0–0.03)
WBC # BLD: 4.9 K/UL (ref 4.8–10.8)

## 2017-03-21 PROCEDURE — 71010 XR CHEST PORTABLE: CPT

## 2017-03-21 PROCEDURE — 2580000003 HC RX 258: Performed by: INTERNAL MEDICINE

## 2017-03-21 PROCEDURE — 99285 EMERGENCY DEPT VISIT HI MDM: CPT

## 2017-03-21 PROCEDURE — 93005 ELECTROCARDIOGRAM TRACING: CPT

## 2017-03-21 PROCEDURE — 84484 ASSAY OF TROPONIN QUANT: CPT

## 2017-03-21 PROCEDURE — 83880 ASSAY OF NATRIURETIC PEPTIDE: CPT

## 2017-03-21 PROCEDURE — 99220 PR INITIAL OBSERVATION CARE/DAY 70 MINUTES: CPT | Performed by: INTERNAL MEDICINE

## 2017-03-21 PROCEDURE — 85025 COMPLETE CBC W/AUTO DIFF WBC: CPT

## 2017-03-21 PROCEDURE — 6370000000 HC RX 637 (ALT 250 FOR IP): Performed by: INTERNAL MEDICINE

## 2017-03-21 PROCEDURE — 80053 COMPREHEN METABOLIC PANEL: CPT

## 2017-03-21 PROCEDURE — 99285 EMERGENCY DEPT VISIT HI MDM: CPT | Performed by: EMERGENCY MEDICINE

## 2017-03-21 PROCEDURE — 36415 COLL VENOUS BLD VENIPUNCTURE: CPT

## 2017-03-21 PROCEDURE — G0378 HOSPITAL OBSERVATION PER HR: HCPCS

## 2017-03-21 PROCEDURE — 96372 THER/PROPH/DIAG INJ SC/IM: CPT

## 2017-03-21 PROCEDURE — 6370000000 HC RX 637 (ALT 250 FOR IP): Performed by: EMERGENCY MEDICINE

## 2017-03-21 PROCEDURE — 6360000002 HC RX W HCPCS: Performed by: INTERNAL MEDICINE

## 2017-03-21 RX ORDER — HYDRALAZINE HYDROCHLORIDE 50 MG/1
50 TABLET, FILM COATED ORAL 3 TIMES DAILY
Status: ON HOLD | COMMUNITY
End: 2017-03-22 | Stop reason: HOSPADM

## 2017-03-21 RX ORDER — SODIUM CHLORIDE 0.9 % (FLUSH) 0.9 %
10 SYRINGE (ML) INJECTION PRN
Status: DISCONTINUED | OUTPATIENT
Start: 2017-03-21 | End: 2017-03-22 | Stop reason: HOSPADM

## 2017-03-21 RX ORDER — ONDANSETRON 2 MG/ML
4 INJECTION INTRAMUSCULAR; INTRAVENOUS EVERY 6 HOURS PRN
Status: DISCONTINUED | OUTPATIENT
Start: 2017-03-21 | End: 2017-03-22

## 2017-03-21 RX ORDER — HYDRALAZINE HYDROCHLORIDE 50 MG/1
25 TABLET, FILM COATED ORAL EVERY 8 HOURS SCHEDULED
Status: DISCONTINUED | OUTPATIENT
Start: 2017-03-21 | End: 2017-03-22

## 2017-03-21 RX ORDER — HYDRALAZINE HYDROCHLORIDE 50 MG/1
50 TABLET, FILM COATED ORAL ONCE
Status: COMPLETED | OUTPATIENT
Start: 2017-03-21 | End: 2017-03-21

## 2017-03-21 RX ORDER — ASPIRIN 81 MG/1
81 TABLET, CHEWABLE ORAL DAILY
Status: DISCONTINUED | OUTPATIENT
Start: 2017-03-21 | End: 2017-03-21

## 2017-03-21 RX ORDER — ASPIRIN 81 MG/1
81 TABLET, CHEWABLE ORAL DAILY
Status: DISCONTINUED | OUTPATIENT
Start: 2017-03-21 | End: 2017-03-22

## 2017-03-21 RX ORDER — SODIUM CHLORIDE 0.9 % (FLUSH) 0.9 %
10 SYRINGE (ML) INJECTION EVERY 12 HOURS SCHEDULED
Status: DISCONTINUED | OUTPATIENT
Start: 2017-03-21 | End: 2017-03-22 | Stop reason: HOSPADM

## 2017-03-21 RX ADMIN — HYDRALAZINE HYDROCHLORIDE 50 MG: 50 TABLET, FILM COATED ORAL at 19:35

## 2017-03-21 RX ADMIN — HYDRALAZINE HYDROCHLORIDE 50 MG: 50 TABLET, FILM COATED ORAL at 14:12

## 2017-03-21 RX ADMIN — ENOXAPARIN SODIUM 40 MG: 40 INJECTION SUBCUTANEOUS at 15:36

## 2017-03-21 RX ADMIN — ASPIRIN 81 MG: 81 TABLET, CHEWABLE ORAL at 15:36

## 2017-03-21 RX ADMIN — HYDRALAZINE HYDROCHLORIDE 25 MG: 50 TABLET, FILM COATED ORAL at 22:46

## 2017-03-21 RX ADMIN — Medication 10 ML: at 22:48

## 2017-03-21 ASSESSMENT — ENCOUNTER SYMPTOMS
COLOR CHANGE: 0
CHEST TIGHTNESS: 1
ABDOMINAL PAIN: 0
ABDOMINAL DISTENTION: 0
SORE THROAT: 0
SHORTNESS OF BREATH: 1
CONSTIPATION: 0
COUGH: 0
DIARRHEA: 0
NAUSEA: 0
PHOTOPHOBIA: 0
BACK PAIN: 0
TROUBLE SWALLOWING: 0
RHINORRHEA: 0
VOMITING: 0
WHEEZING: 0
EYE PAIN: 0

## 2017-03-21 ASSESSMENT — PAIN SCALES - GENERAL: PAINLEVEL_OUTOF10: 8

## 2017-03-22 ENCOUNTER — APPOINTMENT (OUTPATIENT)
Dept: NUCLEAR MEDICINE | Age: 67
End: 2017-03-22
Payer: MEDICARE

## 2017-03-22 VITALS
BODY MASS INDEX: 35.69 KG/M2 | TEMPERATURE: 97.5 F | HEART RATE: 60 BPM | OXYGEN SATURATION: 98 % | WEIGHT: 227.4 LBS | SYSTOLIC BLOOD PRESSURE: 160 MMHG | DIASTOLIC BLOOD PRESSURE: 96 MMHG | RESPIRATION RATE: 20 BRPM | HEIGHT: 67 IN

## 2017-03-22 LAB
ALBUMIN SERPL-MCNC: 3.7 G/DL (ref 3.5–5.2)
ALP BLD-CCNC: 79 U/L (ref 35–104)
ALT SERPL-CCNC: 9 U/L (ref 5–33)
ANION GAP SERPL CALCULATED.3IONS-SCNC: 15 MMOL/L (ref 7–19)
AST SERPL-CCNC: 21 U/L (ref 5–32)
BASOPHILS ABSOLUTE: 0 K/UL (ref 0–0.2)
BASOPHILS RELATIVE PERCENT: 0.6 % (ref 0–1)
BILIRUB SERPL-MCNC: 0.6 MG/DL (ref 0.2–1.2)
BUN BLDV-MCNC: 20 MG/DL (ref 8–23)
CALCIUM SERPL-MCNC: 8.8 MG/DL (ref 8.8–10.2)
CHLORIDE BLD-SCNC: 101 MMOL/L (ref 98–111)
CHOLESTEROL, TOTAL: 193 MG/DL (ref 160–199)
CO2: 24 MMOL/L (ref 22–29)
CREAT SERPL-MCNC: 0.9 MG/DL (ref 0.5–0.9)
EOSINOPHILS ABSOLUTE: 0.1 K/UL (ref 0–0.6)
EOSINOPHILS RELATIVE PERCENT: 1.5 % (ref 0–5)
GFR NON-AFRICAN AMERICAN: >60
GLOBULIN: 3.6 G/DL
GLUCOSE BLD-MCNC: 84 MG/DL (ref 74–109)
HCT VFR BLD CALC: 38.6 % (ref 37–47)
HDLC SERPL-MCNC: 97 MG/DL (ref 65–121)
HEMOGLOBIN: 12.1 G/DL (ref 12–16)
INR BLD: 1.06 (ref 0.88–1.18)
LDL CHOLESTEROL CALCULATED: 84 MG/DL
LYMPHOCYTES ABSOLUTE: 2.1 K/UL (ref 1.1–4.5)
LYMPHOCYTES RELATIVE PERCENT: 43.9 % (ref 20–40)
MCH RBC QN AUTO: 28.5 PG (ref 27–31)
MCHC RBC AUTO-ENTMCNC: 31.3 G/DL (ref 33–37)
MCV RBC AUTO: 90.8 FL (ref 81–99)
MONOCYTES ABSOLUTE: 0.6 K/UL (ref 0–0.9)
MONOCYTES RELATIVE PERCENT: 12.1 % (ref 0–10)
NEUTROPHILS ABSOLUTE: 2 K/UL (ref 1.5–7.5)
NEUTROPHILS RELATIVE PERCENT: 41.7 % (ref 50–65)
PDW BLD-RTO: 15.2 % (ref 11.5–14.5)
PLATELET # BLD: 184 K/UL (ref 130–400)
PMV BLD AUTO: 12.5 FL (ref 7.4–10.4)
POTASSIUM SERPL-SCNC: 3.6 MMOL/L (ref 3.5–5)
PRO-BNP: 199 PG/ML (ref 0–900)
PROTHROMBIN TIME: 13.8 SEC (ref 12–14.6)
RBC # BLD: 4.25 M/UL (ref 4.2–5.4)
SODIUM BLD-SCNC: 140 MMOL/L (ref 136–145)
TOTAL PROTEIN: 7.3 G/DL (ref 6.6–8.7)
TRIGL SERPL-MCNC: 58 MG/DL (ref 150–199)
TROPONIN: <0.01 NG/ML (ref 0–0.03)
WBC # BLD: 4.7 K/UL (ref 4.8–10.8)

## 2017-03-22 PROCEDURE — 80061 LIPID PANEL: CPT

## 2017-03-22 PROCEDURE — 99225 PR SBSQ OBSERVATION CARE/DAY 25 MINUTES: CPT | Performed by: INTERNAL MEDICINE

## 2017-03-22 PROCEDURE — 36415 COLL VENOUS BLD VENIPUNCTURE: CPT

## 2017-03-22 PROCEDURE — 93017 CV STRESS TEST TRACING ONLY: CPT

## 2017-03-22 PROCEDURE — A9500 TC99M SESTAMIBI: HCPCS | Performed by: INTERNAL MEDICINE

## 2017-03-22 PROCEDURE — 2500000003 HC RX 250 WO HCPCS: Performed by: INTERNAL MEDICINE

## 2017-03-22 PROCEDURE — 6370000000 HC RX 637 (ALT 250 FOR IP): Performed by: INTERNAL MEDICINE

## 2017-03-22 PROCEDURE — 93005 ELECTROCARDIOGRAM TRACING: CPT

## 2017-03-22 PROCEDURE — 6360000002 HC RX W HCPCS: Performed by: INTERNAL MEDICINE

## 2017-03-22 PROCEDURE — 85610 PROTHROMBIN TIME: CPT

## 2017-03-22 PROCEDURE — 83880 ASSAY OF NATRIURETIC PEPTIDE: CPT

## 2017-03-22 PROCEDURE — 78452 HT MUSCLE IMAGE SPECT MULT: CPT

## 2017-03-22 PROCEDURE — 80053 COMPREHEN METABOLIC PANEL: CPT

## 2017-03-22 PROCEDURE — 96372 THER/PROPH/DIAG INJ SC/IM: CPT

## 2017-03-22 PROCEDURE — 3430000000 HC RX DIAGNOSTIC RADIOPHARMACEUTICAL: Performed by: INTERNAL MEDICINE

## 2017-03-22 PROCEDURE — 84484 ASSAY OF TROPONIN QUANT: CPT

## 2017-03-22 PROCEDURE — G0378 HOSPITAL OBSERVATION PER HR: HCPCS

## 2017-03-22 PROCEDURE — 85025 COMPLETE CBC W/AUTO DIFF WBC: CPT

## 2017-03-22 PROCEDURE — 2580000003 HC RX 258: Performed by: INTERNAL MEDICINE

## 2017-03-22 RX ORDER — PANTOPRAZOLE SODIUM 40 MG/1
40 TABLET, DELAYED RELEASE ORAL 2 TIMES DAILY
Status: DISCONTINUED | OUTPATIENT
Start: 2017-03-22 | End: 2017-03-22 | Stop reason: HOSPADM

## 2017-03-22 RX ORDER — PANTOPRAZOLE SODIUM 40 MG/1
40 TABLET, DELAYED RELEASE ORAL 2 TIMES DAILY
COMMUNITY
End: 2017-12-01 | Stop reason: SDUPTHER

## 2017-03-22 RX ORDER — CLONIDINE HYDROCHLORIDE 0.1 MG/1
0.1 TABLET ORAL 2 TIMES DAILY
Status: DISCONTINUED | OUTPATIENT
Start: 2017-03-23 | End: 2017-03-22 | Stop reason: HOSPADM

## 2017-03-22 RX ORDER — ASPIRIN 81 MG/1
81 TABLET ORAL DAILY
Status: DISCONTINUED | OUTPATIENT
Start: 2017-03-22 | End: 2017-03-22 | Stop reason: HOSPADM

## 2017-03-22 RX ORDER — LOSARTAN POTASSIUM AND HYDROCHLOROTHIAZIDE 25; 100 MG/1; MG/1
1 TABLET ORAL DAILY
Qty: 30 TABLET | Refills: 1 | Status: SHIPPED | OUTPATIENT
Start: 2017-03-22 | End: 2017-07-17 | Stop reason: SDUPTHER

## 2017-03-22 RX ORDER — CLONIDINE HYDROCHLORIDE 0.1 MG/1
0.1 TABLET ORAL 2 TIMES DAILY
Qty: 60 TABLET | Refills: 1 | Status: SHIPPED | OUTPATIENT
Start: 2017-03-23 | End: 2018-03-06 | Stop reason: ALTCHOICE

## 2017-03-22 RX ORDER — LABETALOL HYDROCHLORIDE 5 MG/ML
20 INJECTION, SOLUTION INTRAVENOUS EVERY 4 HOURS PRN
Status: DISCONTINUED | OUTPATIENT
Start: 2017-03-22 | End: 2017-03-22

## 2017-03-22 RX ORDER — LEVOTHYROXINE SODIUM 0.1 MG/1
100 TABLET ORAL DAILY
Status: DISCONTINUED | OUTPATIENT
Start: 2017-03-22 | End: 2017-03-22 | Stop reason: HOSPADM

## 2017-03-22 RX ORDER — POTASSIUM CHLORIDE 750 MG/1
10 TABLET, EXTENDED RELEASE ORAL DAILY
Qty: 30 TABLET | Refills: 3 | Status: SHIPPED | OUTPATIENT
Start: 2017-03-22 | End: 2018-04-17 | Stop reason: SDUPTHER

## 2017-03-22 RX ORDER — LABETALOL HYDROCHLORIDE 5 MG/ML
20 INJECTION, SOLUTION INTRAVENOUS EVERY 4 HOURS
Status: DISCONTINUED | OUTPATIENT
Start: 2017-03-22 | End: 2017-03-22

## 2017-03-22 RX ADMIN — LEVOTHYROXINE SODIUM 100 MCG: 100 TABLET ORAL at 14:34

## 2017-03-22 RX ADMIN — REGADENOSON 0.4 MG: 0.08 INJECTION, SOLUTION INTRAVENOUS at 10:23

## 2017-03-22 RX ADMIN — HYDRALAZINE HYDROCHLORIDE 25 MG: 50 TABLET, FILM COATED ORAL at 07:14

## 2017-03-22 RX ADMIN — TETRAKIS(2-METHOXYISOBUTYLISOCYANIDE)COPPER(I) TETRAFLUOROBORATE 10 MILLICURIE: 1 INJECTION, POWDER, LYOPHILIZED, FOR SOLUTION INTRAVENOUS at 10:23

## 2017-03-22 RX ADMIN — PANTOPRAZOLE SODIUM 40 MG: 40 TABLET, DELAYED RELEASE ORAL at 14:34

## 2017-03-22 RX ADMIN — ASPIRIN 81 MG: 81 TABLET, COATED ORAL at 14:34

## 2017-03-22 RX ADMIN — ENOXAPARIN SODIUM 40 MG: 40 INJECTION SUBCUTANEOUS at 12:03

## 2017-03-22 RX ADMIN — TETRAKIS(2-METHOXYISOBUTYLISOCYANIDE)COPPER(I) TETRAFLUOROBORATE 30 MILLICURIE: 1 INJECTION, POWDER, LYOPHILIZED, FOR SOLUTION INTRAVENOUS at 10:24

## 2017-03-22 RX ADMIN — Medication 10 ML: at 12:07

## 2017-03-22 RX ADMIN — LABETALOL HYDROCHLORIDE 20 MG: 5 INJECTION, SOLUTION INTRAVENOUS at 03:16

## 2017-03-22 RX ADMIN — ASPIRIN 81 MG: 81 TABLET, CHEWABLE ORAL at 12:03

## 2017-03-23 LAB
EKG P AXIS: -35 DEGREES
EKG P AXIS: 20 DEGREES
EKG P AXIS: NORMAL DEGREES
EKG P-R INTERVAL: 130 MS
EKG P-R INTERVAL: 150 MS
EKG P-R INTERVAL: NORMAL MS
EKG Q-T INTERVAL: 424 MS
EKG Q-T INTERVAL: 446 MS
EKG Q-T INTERVAL: 482 MS
EKG QRS DURATION: 108 MS
EKG QRS DURATION: 110 MS
EKG QRS DURATION: 120 MS
EKG QTC CALCULATION (BAZETT): 438 MS
EKG QTC CALCULATION (BAZETT): 446 MS
EKG QTC CALCULATION (BAZETT): 483 MS
EKG T AXIS: -65 DEGREES
EKG T AXIS: 119 DEGREES
EKG T AXIS: 44 DEGREES

## 2017-03-24 ENCOUNTER — RESULTS ENCOUNTER (OUTPATIENT)
Dept: BARIATRICS/WEIGHT MGMT | Facility: CLINIC | Age: 67
End: 2017-03-24

## 2017-03-24 DIAGNOSIS — Z98.84 HISTORY OF ROUX-EN-Y GASTRIC BYPASS: ICD-10-CM

## 2017-03-24 DIAGNOSIS — I10 HYPERTENSION, UNCONTROLLED: ICD-10-CM

## 2017-03-24 DIAGNOSIS — E66.01 MORBID OBESITY DUE TO EXCESS CALORIES (HCC): ICD-10-CM

## 2017-03-24 DIAGNOSIS — E55.9 VITAMIN D DEFICIENCY: ICD-10-CM

## 2017-04-10 ENCOUNTER — APPOINTMENT (OUTPATIENT)
Dept: LAB | Facility: HOSPITAL | Age: 67
End: 2017-04-10

## 2017-04-10 ENCOUNTER — OFFICE VISIT (OUTPATIENT)
Dept: CARDIOLOGY | Facility: CLINIC | Age: 67
End: 2017-04-10

## 2017-04-10 ENCOUNTER — TELEPHONE (OUTPATIENT)
Dept: BARIATRICS/WEIGHT MGMT | Facility: CLINIC | Age: 67
End: 2017-04-10

## 2017-04-10 VITALS
WEIGHT: 238 LBS | HEART RATE: 65 BPM | DIASTOLIC BLOOD PRESSURE: 94 MMHG | BODY MASS INDEX: 38.25 KG/M2 | HEIGHT: 66 IN | SYSTOLIC BLOOD PRESSURE: 150 MMHG

## 2017-04-10 DIAGNOSIS — E55.9 VITAMIN D DEFICIENCY: Primary | ICD-10-CM

## 2017-04-10 DIAGNOSIS — Z95.0 PACEMAKER: ICD-10-CM

## 2017-04-10 DIAGNOSIS — I10 HYPERTENSION, UNCONTROLLED: Primary | ICD-10-CM

## 2017-04-10 DIAGNOSIS — D68.62 LUPUS ANTICOAGULANT DISORDER (HCC): ICD-10-CM

## 2017-04-10 LAB — 25(OH)D3 SERPL-MCNC: 28.8 NG/ML (ref 30–100)

## 2017-04-10 PROCEDURE — 93000 ELECTROCARDIOGRAM COMPLETE: CPT | Performed by: INTERNAL MEDICINE

## 2017-04-10 PROCEDURE — 82306 VITAMIN D 25 HYDROXY: CPT | Performed by: NURSE PRACTITIONER

## 2017-04-10 PROCEDURE — 99214 OFFICE O/P EST MOD 30 MIN: CPT | Performed by: INTERNAL MEDICINE

## 2017-04-10 RX ORDER — LOSARTAN POTASSIUM AND HYDROCHLOROTHIAZIDE 25; 100 MG/1; MG/1
1 TABLET ORAL DAILY
COMMUNITY
End: 2019-02-12 | Stop reason: ALTCHOICE

## 2017-04-10 RX ORDER — CLONIDINE HYDROCHLORIDE 0.1 MG/1
0.1 TABLET ORAL 2 TIMES DAILY PRN
COMMUNITY

## 2017-04-10 RX ORDER — ERGOCALCIFEROL 1.25 MG/1
50000 CAPSULE ORAL WEEKLY
Qty: 4 CAPSULE | Refills: 3 | Status: ON HOLD | OUTPATIENT
Start: 2017-04-10 | End: 2020-02-26

## 2017-04-10 NOTE — TELEPHONE ENCOUNTER
----- Message from CATRACHITO Hall sent at 4/10/2017 11:24 AM CDT -----  Vitamin D is low. I will send script in for her to take Vitamin D 50,000 units once per week. It will have refills. She will need level rechecked in July. Thanks!      4/10/17  Called patient and informed of lab results and instructions.  AT

## 2017-04-10 NOTE — PROGRESS NOTES
Referring Provider: Zora Jackson MD    Reason for Follow-up Visit: pacemaker     Subjective .   Chief Complaint:   Chief Complaint   Patient presents with   • Follow-up     18 month check.  having some problems with HTN and chest pain   • Chest Pain     starts in the middle of the chest and radiates into the back like a pressure senstation   • Shortness of Breath     mostly all the time   • Fatigue     all the time   • Pacemaker Check     needs pacer checked today.       History of present illness:  Veronica Stewart is a 66 y.o. yo female with history of SSS, s/p pacemaker placement in the past. Complains of fatigue. She has stopped most of her medications including synthroid and coumadin        History  Past Medical History:   Diagnosis Date   • Anxiety    • Redd-tachy syndrome    • Bradycardia    • Breath shortness    • Cardiac pacemaker     11/09 MED [facility]   • Chest pain    • Chronic fatigue    • Depression    • Diabetes mellitus    • Dizziness    • Fatigue    • Follow-up exam    • Heart burn    • Hypercoagulable state, primary     LUPUS ANTI-COAG   • Hyperlipidemia    • Hypertension    • Liver disease    • Shortness of breath    • Sleep apnea    • Stroke    • Syncope    • Tachy-redd syndrome    ,   Past Surgical History:   Procedure Laterality Date   • APPENDECTOMY     • CATARACT EXTRACTION     • CHOLECYSTECTOMY     • HERNIA REPAIR     • HYSTERECTOMY     • PACEMAKER IMPLANTATION     • SPLENECTOMY     ,   Family History   Problem Relation Age of Onset   • Coronary artery disease Mother    • Hyperlipidemia Mother    • Anemia Mother    • Cervical cancer Mother    • Kidney failure Father    • Heart failure Father    • Fibromyalgia Sister    • Anemia Sister    • Clotting disorder Sister    • Alcohol abuse Brother    • Cancer Maternal Grandmother    • Diabetes Maternal Grandmother    • Heart failure Maternal Grandfather    • No Known Problems Paternal Grandmother    • No Known Problems Paternal Grandfather    •  Colon cancer Brother    ,   Social History   Substance Use Topics   • Smoking status: Never Smoker   • Smokeless tobacco: Never Used   • Alcohol use No   ,     Medications  Current Outpatient Prescriptions   Medication Sig Dispense Refill   • ADVAIR DISKUS 250-50 MCG/DOSE DISKUS 2 (two) times a day.     • aspirin 81 MG EC tablet daily.     • CloNIDine (CATAPRES) 0.1 MG tablet Take 0.1 mg by mouth Every Night.     • levothyroxine (SYNTHROID, LEVOTHROID) 100 MCG tablet daily.     • losartan-hydrochlorothiazide (HYZAAR) 100-25 MG per tablet Take 1 tablet by mouth Daily.     • Multiple Vitamins-Minerals (CENTRUM MULTIGUMMIES) chewable tablet Chew Daily.     • pantoprazole (PROTONIX) 40 MG EC tablet 2 (two) times a day.     • potassium chloride (MICRO-K) 10 MEQ CR capsule daily.     • bumetanide (BUMEX) 2 MG tablet 2 (two) times a day.     • calcium-vitamin D (OSCAL-500) 500-200 MG-UNIT per tablet daily.     • CARAFATE 1 GM/10ML suspension 4 (four) times a day.     • cyanocobalamin 1000 MCG/ML injection Inject 1,000 mcg into the shoulder, thigh, or buttocks Every 28 (Twenty-Eight) Days.     • DULoxetine (CYMBALTA) 30 MG capsule Take 30 mg by mouth Daily.     • gabapentin (NEURONTIN) 300 MG capsule daily.     • hydrALAZINE (APRESOLINE) 50 MG tablet 3 (three) times a day.     • LORazepam (ATIVAN) 0.5 MG tablet daily.     • LOSARTAN POTASSIUM PO Take 50 mg by mouth 2 (Two) Times a Day.     • metFORMIN (GLUCOPHAGE) 500 MG tablet 2 (two) times a day.     • methylPREDNISolone acetate (DEPO-medrol) 80 MG/ML injection Inject 80 mg into the shoulder, thigh, or buttocks 1 (One) Time.     • misoprostol (CYTOTEC) 200 MCG tablet 4 (four) times a day.     • mometasone (NASONEX) 50 MCG/ACT nasal spray daily.     • paricalcitol (ZEMPLAR) 1 MCG capsule Take 1 mcg by mouth Daily.     • pravastatin (PRAVACHOL) 40 MG tablet daily.     • PROAIR  (90 BASE) MCG/ACT inhaler as needed.     • warfarin (COUMADIN) 10 MG tablet daily.     •  "warfarin (COUMADIN) 5 MG tablet daily.       No current facility-administered medications for this visit.        Allergies:  Bee venom; Other; Percocet [oxycodone-acetaminophen]; and Ultram [tramadol hcl]    Review of Systems  Review of Systems   HENT: Negative for nosebleeds.    Cardiovascular: Negative for chest pain, claudication, dyspnea on exertion, irregular heartbeat, leg swelling, near-syncope, orthopnea, palpitations, paroxysmal nocturnal dyspnea and syncope.   Respiratory: Positive for shortness of breath. Negative for cough and hemoptysis.    Gastrointestinal: Negative for dysphagia, hematemesis and melena.   Genitourinary: Negative for hematuria.       Objective     Physical Exam:  /94 (BP Location: Left arm, Patient Position: Sitting, Cuff Size: Large Adult)  Pulse 65  Ht 66\" (167.6 cm)  Wt 238 lb (108 kg)  BMI 38.41 kg/m2  Physical Exam   Constitutional: She is oriented to person, place, and time. She appears well-developed and well-nourished. No distress.   HENT:   Head: Normocephalic and atraumatic.   Eyes: No scleral icterus.   Neck: Normal range of motion.   Cardiovascular: Normal rate, regular rhythm and normal heart sounds.  Exam reveals no gallop and no friction rub.    No murmur heard.  Pulmonary/Chest: Effort normal and breath sounds normal. No respiratory distress. She has no wheezes. She has no rales.   Abdominal: Soft. Bowel sounds are normal. She exhibits no distension. There is no tenderness.   Musculoskeletal: She exhibits edema.   Neurological: She is alert and oriented to person, place, and time. No cranial nerve deficit.   Skin: Skin is warm and dry. She is not diaphoretic. No erythema.   Psychiatric: She has a normal mood and affect. Her behavior is normal.       Results Review:    ECG 12 Lead  Date/Time: 4/10/2017 9:42 AM  Performed by: JARED BURGESS  Authorized by: JARED BURGESS   Comparison: compared with previous ECG   Similar to previous ECG  Rhythm: sinus rhythm and " paced  Rate: normal  Conduction: conduction normal  ST Segments: ST segments normal  T Waves: T waves normal  QRS axis: normal  Clinical impression: normal ECG            Hospital Outpatient Visit on 04/13/2015   Component Date Value Ref Range Status   • Folate 04/13/2015 5.83  ng/mL Final   • Vitamin B-12 04/13/2015 437  239 - 931 pg/mL Final   • 25 Hydroxy, Vitamin D 04/13/2015 19.3* 30.0 - 100.0 ng/mL Final   • Sodium 04/13/2015 143  135 - 145 mmol/L Final   • Potassium 04/13/2015 4.0  3.5 - 5.3 mmol/L Final   • Chloride 04/13/2015 108  98 - 110 mmol/L Final   • CO2 04/13/2015 23* 24 - 31 mmol/L Final   • Glucose 04/13/2015 86  70 - 100 mg/dL Final   • BUN 04/13/2015 20  5 - 21 mg/dL Final   • Creatinine 04/13/2015 0.94  0.5 - 1.4 mg/dL Final   • Calcium 04/13/2015 9.3  8.4 - 10.4 mg/dL Final   • Total Protein 04/13/2015 7.5  6.3 - 8.7 g/dL Final   • Albumin 04/13/2015 3.9  3.5 - 5.0 g/dL Final   • Total Bilirubin 04/13/2015 0.6  0.1 - 1.0 mg/dL Final   • Alkaline Phosphatase 04/13/2015 75  24 - 120 Units/L Final   • AST (SGOT) 04/13/2015 28  7 - 45 Units/L Final   • ALT (SGPT) 04/13/2015 15  0 - 54 Units/L Final   • Anion Gap 04/13/2015 12  4 - 13 mmol/L Final   • eGFR 04/13/2015 >60  ml/min/1.732 Final    Comment: DF by IF @ 04/13/2015 10:05  GFR Normal                            >60  Chronic Kidney Disease          <60  Kidney Failure                         <15     • Iron 04/13/2015 120  42 - 180 mcg/dL Final   • WBC 04/13/2015 5.77  4.80 - 10.80 K/mcL Final   • RBC 04/13/2015 3.75* 4.20 - 5.40 M/mcL Final   • Hemoglobin 04/13/2015 10.8* 12.0 - 16.0 g/dL Final   • Hematocrit 04/13/2015 33.0* 37.0 - 47.0 % Final   • MCV 04/13/2015 88.0  82.0 - 98.0 fL Final   • MCH 04/13/2015 28.8  28.0 - 32.0 pg Final   • MCHC 04/13/2015 32.7* 33.0 - 36.0 gm/dL Final   • RDW-SD 04/13/2015 54.8* 40.0 - 54.0 fL Final   • RDW-CV 04/13/2015 17.1* 12.0 - 15.0 % Final   • RDW 04/13/2015 17.1* 12 - 15 % Final   • Platelets  04/13/2015 163  130 - 400 K/mcL Final   • Vitamin B1, Whole Blood 04/13/2015 84.2  66.5 - 200.0 nmol/L Final       Assessment/Plan   Veronica was seen today for follow-up, chest pain, shortness of breath, fatigue and pacemaker check.    Diagnoses and all orders for this visit:    Hypertension, uncontrolled, needs to restart some of her medications. Will be decided when she sees Dr. Jackson later this week    Pacemaker, battery life trending down but still OK    Lupus anticoagulant disorder, followed in the past by Dr. Mays but currently not seeing a hematologist    Other orders  -     ECG 12 Lead

## 2017-04-21 ENCOUNTER — RESULTS ENCOUNTER (OUTPATIENT)
Dept: BARIATRICS/WEIGHT MGMT | Facility: CLINIC | Age: 67
End: 2017-04-21

## 2017-04-21 DIAGNOSIS — E55.9 VITAMIN D DEFICIENCY: ICD-10-CM

## 2017-04-21 DIAGNOSIS — E66.01 MORBID OBESITY DUE TO EXCESS CALORIES (HCC): ICD-10-CM

## 2017-04-21 DIAGNOSIS — Z98.84 HISTORY OF ROUX-EN-Y GASTRIC BYPASS: ICD-10-CM

## 2017-04-21 DIAGNOSIS — I10 HYPERTENSION, UNCONTROLLED: ICD-10-CM

## 2017-05-08 ENCOUNTER — CLINICAL SUPPORT (OUTPATIENT)
Dept: CARDIOLOGY | Facility: CLINIC | Age: 67
End: 2017-05-08

## 2017-05-08 DIAGNOSIS — Z95.0 CARDIAC PACEMAKER IN SITU: Primary | ICD-10-CM

## 2017-05-08 DIAGNOSIS — I49.5 SA NODE DYSFUNCTION (HCC): ICD-10-CM

## 2017-05-08 DIAGNOSIS — I49.5 BRADY-TACHY SYNDROME (HCC): ICD-10-CM

## 2017-05-08 PROCEDURE — 93294 REM INTERROG EVL PM/LDLS PM: CPT | Performed by: INTERNAL MEDICINE

## 2017-05-08 PROCEDURE — 93296 REM INTERROG EVL PM/IDS: CPT | Performed by: INTERNAL MEDICINE

## 2017-05-10 ENCOUNTER — OFFICE VISIT (OUTPATIENT)
Dept: BARIATRICS/WEIGHT MGMT | Facility: CLINIC | Age: 67
End: 2017-05-10

## 2017-05-10 VITALS
SYSTOLIC BLOOD PRESSURE: 128 MMHG | RESPIRATION RATE: 20 BRPM | OXYGEN SATURATION: 100 % | BODY MASS INDEX: 36.85 KG/M2 | WEIGHT: 234.8 LBS | DIASTOLIC BLOOD PRESSURE: 66 MMHG | HEART RATE: 66 BPM | HEIGHT: 67 IN

## 2017-05-10 DIAGNOSIS — E55.9 VITAMIN D DEFICIENCY: ICD-10-CM

## 2017-05-10 DIAGNOSIS — Z98.84 HISTORY OF ROUX-EN-Y GASTRIC BYPASS: Primary | ICD-10-CM

## 2017-05-10 DIAGNOSIS — K21.9 GASTROESOPHAGEAL REFLUX DISEASE, ESOPHAGITIS PRESENCE NOT SPECIFIED: ICD-10-CM

## 2017-05-10 DIAGNOSIS — Z87.898 HISTORY OF ULCER DISEASE: ICD-10-CM

## 2017-05-10 DIAGNOSIS — R11.2 NON-INTRACTABLE VOMITING WITH NAUSEA, UNSPECIFIED VOMITING TYPE: ICD-10-CM

## 2017-05-10 PROCEDURE — 99213 OFFICE O/P EST LOW 20 MIN: CPT | Performed by: NURSE PRACTITIONER

## 2017-05-10 RX ORDER — FUROSEMIDE 20 MG/1
20 TABLET ORAL DAILY
COMMUNITY
End: 2018-01-29 | Stop reason: ALTCHOICE

## 2017-05-10 RX ORDER — POTASSIUM CHLORIDE 750 MG/1
10 TABLET, EXTENDED RELEASE ORAL DAILY
COMMUNITY
Start: 2017-03-22 | End: 2021-04-26 | Stop reason: ALTCHOICE

## 2017-05-10 RX ORDER — LEVOTHYROXINE SODIUM 0.1 MG/1
100 TABLET ORAL DAILY
COMMUNITY
End: 2022-08-29

## 2017-05-10 RX ORDER — SUCRALFATE 1 G/10ML
1 SUSPENSION ORAL 4 TIMES DAILY
Qty: 420 ML | Refills: 0 | Status: SHIPPED | OUTPATIENT
Start: 2017-05-10 | End: 2019-04-29 | Stop reason: ALTCHOICE

## 2017-05-10 RX ORDER — RANITIDINE 150 MG/1
150 TABLET ORAL EVERY 12 HOURS PRN
Status: ON HOLD | COMMUNITY
End: 2020-02-26

## 2017-05-15 ENCOUNTER — OFFICE VISIT (OUTPATIENT)
Dept: BARIATRICS/WEIGHT MGMT | Facility: CLINIC | Age: 67
End: 2017-05-15

## 2017-05-15 ENCOUNTER — TRANSCRIBE ORDERS (OUTPATIENT)
Dept: ADMINISTRATIVE | Facility: HOSPITAL | Age: 67
End: 2017-05-15

## 2017-05-15 VITALS
WEIGHT: 235.4 LBS | HEIGHT: 67 IN | HEART RATE: 73 BPM | BODY MASS INDEX: 36.95 KG/M2 | DIASTOLIC BLOOD PRESSURE: 100 MMHG | SYSTOLIC BLOOD PRESSURE: 190 MMHG | TEMPERATURE: 97.5 F | OXYGEN SATURATION: 100 %

## 2017-05-15 DIAGNOSIS — K21.00 REFLUX ESOPHAGITIS: ICD-10-CM

## 2017-05-15 DIAGNOSIS — Z98.84 HISTORY OF ROUX-EN-Y GASTRIC BYPASS: ICD-10-CM

## 2017-05-15 DIAGNOSIS — K21.9 GASTROESOPHAGEAL REFLUX DISEASE, ESOPHAGITIS PRESENCE NOT SPECIFIED: ICD-10-CM

## 2017-05-15 DIAGNOSIS — K28.7 CHRONIC GASTROJEJUNAL ULCER WITHOUT HEMORRHAGE OR PERFORATION: ICD-10-CM

## 2017-05-15 DIAGNOSIS — Z87.898 HISTORY OF ULCER DISEASE: Primary | ICD-10-CM

## 2017-05-15 PROCEDURE — 99214 OFFICE O/P EST MOD 30 MIN: CPT | Performed by: SURGERY

## 2017-05-17 ENCOUNTER — HOSPITAL ENCOUNTER (OUTPATIENT)
Dept: GENERAL RADIOLOGY | Facility: HOSPITAL | Age: 67
Discharge: HOME | End: 2017-05-17
Attending: SURGERY | Admitting: SURGERY

## 2017-05-17 DIAGNOSIS — Z98.84 HISTORY OF ROUX-EN-Y GASTRIC BYPASS: ICD-10-CM

## 2017-05-17 DIAGNOSIS — K21.00 REFLUX ESOPHAGITIS: ICD-10-CM

## 2017-05-17 DIAGNOSIS — Z87.898 HISTORY OF ULCER DISEASE: ICD-10-CM

## 2017-05-17 PROCEDURE — 74250 X-RAY XM SM INT 1CNTRST STD: CPT

## 2017-05-18 ENCOUNTER — TELEPHONE (OUTPATIENT)
Dept: BARIATRICS/WEIGHT MGMT | Facility: CLINIC | Age: 67
End: 2017-05-18

## 2017-05-19 ENCOUNTER — RESULTS ENCOUNTER (OUTPATIENT)
Dept: BARIATRICS/WEIGHT MGMT | Facility: CLINIC | Age: 67
End: 2017-05-19

## 2017-05-19 DIAGNOSIS — E66.01 MORBID OBESITY DUE TO EXCESS CALORIES (HCC): ICD-10-CM

## 2017-05-19 DIAGNOSIS — Z98.84 HISTORY OF ROUX-EN-Y GASTRIC BYPASS: ICD-10-CM

## 2017-05-19 DIAGNOSIS — E55.9 VITAMIN D DEFICIENCY: ICD-10-CM

## 2017-05-19 DIAGNOSIS — I10 HYPERTENSION, UNCONTROLLED: ICD-10-CM

## 2017-06-04 ENCOUNTER — HOSPITAL ENCOUNTER (EMERGENCY)
Age: 67
Discharge: HOME | End: 2017-06-04
Attending: EMERGENCY MEDICINE
Payer: MEDICARE

## 2017-06-04 VITALS
HEART RATE: 66 BPM | TEMPERATURE: 98.2 F | SYSTOLIC BLOOD PRESSURE: 166 MMHG | DIASTOLIC BLOOD PRESSURE: 89 MMHG | OXYGEN SATURATION: 96 % | RESPIRATION RATE: 17 BRPM

## 2017-06-04 DIAGNOSIS — M79.604 LEG PAIN, CENTRAL, RIGHT: Primary | ICD-10-CM

## 2017-06-04 PROCEDURE — 93971 EXTREMITY STUDY: CPT

## 2017-06-04 PROCEDURE — 99283 EMERGENCY DEPT VISIT LOW MDM: CPT

## 2017-06-04 PROCEDURE — 99282 EMERGENCY DEPT VISIT SF MDM: CPT | Performed by: EMERGENCY MEDICINE

## 2017-06-04 PROCEDURE — 6370000000 HC RX 637 (ALT 250 FOR IP): Performed by: EMERGENCY MEDICINE

## 2017-06-04 RX ORDER — HYDROCODONE BITARTRATE AND ACETAMINOPHEN 5; 325 MG/1; MG/1
1 TABLET ORAL ONCE
Status: COMPLETED | OUTPATIENT
Start: 2017-06-04 | End: 2017-06-04

## 2017-06-04 RX ORDER — CLONIDINE HYDROCHLORIDE 0.1 MG/1
0.2 TABLET ORAL ONCE
Status: COMPLETED | OUTPATIENT
Start: 2017-06-04 | End: 2017-06-04

## 2017-06-04 RX ORDER — CLONIDINE HYDROCHLORIDE 0.1 MG/1
0.1 TABLET ORAL ONCE
Status: COMPLETED | OUTPATIENT
Start: 2017-06-04 | End: 2017-06-04

## 2017-06-04 RX ORDER — HYDROCODONE BITARTRATE AND ACETAMINOPHEN 5; 325 MG/1; MG/1
1 TABLET ORAL EVERY 6 HOURS PRN
Qty: 10 TABLET | Refills: 0 | Status: SHIPPED | OUTPATIENT
Start: 2017-06-04 | End: 2017-06-11

## 2017-06-04 RX ADMIN — CLONIDINE HYDROCHLORIDE 0.2 MG: 0.1 TABLET ORAL at 19:45

## 2017-06-04 RX ADMIN — HYDROCODONE BITARTRATE AND ACETAMINOPHEN 1 TABLET: 5; 325 TABLET ORAL at 16:43

## 2017-06-04 RX ADMIN — CLONIDINE HYDROCHLORIDE 0.1 MG: 0.1 TABLET ORAL at 16:43

## 2017-06-04 ASSESSMENT — PAIN DESCRIPTION - ORIENTATION
ORIENTATION: RIGHT
ORIENTATION: LEFT

## 2017-06-04 ASSESSMENT — PAIN SCALES - GENERAL
PAINLEVEL_OUTOF10: 9
PAINLEVEL_OUTOF10: 9
PAINLEVEL_OUTOF10: 3

## 2017-06-04 ASSESSMENT — PAIN DESCRIPTION - LOCATION
LOCATION: LEG
LOCATION: LEG

## 2017-06-04 ASSESSMENT — PAIN - FUNCTIONAL ASSESSMENT: PAIN_FUNCTIONAL_ASSESSMENT: 0-10

## 2017-06-15 ENCOUNTER — APPOINTMENT (OUTPATIENT)
Dept: LAB | Facility: HOSPITAL | Age: 67
End: 2017-06-15

## 2017-06-15 ENCOUNTER — OFFICE VISIT (OUTPATIENT)
Dept: BARIATRICS/WEIGHT MGMT | Facility: CLINIC | Age: 67
End: 2017-06-15

## 2017-06-15 VITALS
OXYGEN SATURATION: 99 % | TEMPERATURE: 98.2 F | HEART RATE: 62 BPM | SYSTOLIC BLOOD PRESSURE: 130 MMHG | DIASTOLIC BLOOD PRESSURE: 78 MMHG | BODY MASS INDEX: 37.13 KG/M2 | WEIGHT: 236.6 LBS | HEIGHT: 67 IN

## 2017-06-15 DIAGNOSIS — K21.9 CHRONIC GERD: ICD-10-CM

## 2017-06-15 DIAGNOSIS — Z87.898 HISTORY OF ULCER DISEASE: ICD-10-CM

## 2017-06-15 DIAGNOSIS — E13.69 OTHER SPECIFIED DIABETES MELLITUS WITH OTHER SPECIFIED COMPLICATION (HCC): ICD-10-CM

## 2017-06-15 DIAGNOSIS — Z98.84 HISTORY OF ROUX-EN-Y GASTRIC BYPASS: Primary | ICD-10-CM

## 2017-06-15 PROBLEM — E11.9 DIABETES MELLITUS (HCC): Status: ACTIVE | Noted: 2017-06-15

## 2017-06-15 LAB
CALCIUM SPEC-SCNC: 9.7 MG/DL (ref 8.4–10.4)
FOLATE SERPL-MCNC: 9.85 NG/ML
IRON 24H UR-MRATE: 68 MCG/DL (ref 42–180)
VIT B12 BLD-MCNC: 306 PG/ML (ref 239–931)

## 2017-06-15 PROCEDURE — 83540 ASSAY OF IRON: CPT | Performed by: SURGERY

## 2017-06-15 PROCEDURE — 82746 ASSAY OF FOLIC ACID SERUM: CPT | Performed by: SURGERY

## 2017-06-15 PROCEDURE — 82607 VITAMIN B-12: CPT | Performed by: SURGERY

## 2017-06-15 PROCEDURE — 99214 OFFICE O/P EST MOD 30 MIN: CPT | Performed by: SURGERY

## 2017-06-15 PROCEDURE — 82310 ASSAY OF CALCIUM: CPT | Performed by: SURGERY

## 2017-06-15 PROCEDURE — 36415 COLL VENOUS BLD VENIPUNCTURE: CPT | Performed by: SURGERY

## 2017-06-15 RX ORDER — HYDROCODONE BITARTRATE AND ACETAMINOPHEN 5; 325 MG/1; MG/1
TABLET ORAL
Refills: 0 | COMMUNITY
Start: 2017-06-04 | End: 2017-10-12

## 2017-06-15 NOTE — PROGRESS NOTES
Patient Care Team:  Zora Jackson MD as PCP - General  Zora Jackson MD as PCP - Family Medicine  Ronny Lowe MD as Cardiologist (Cardiology)    Reason for Visit:  Surgical Weight loss/GERD    Subjective     Patient is a 66 y.o. female presents with morbid obesity and her Body mass index is 37.06 kg/(m^2).    She is here for follow up visit from her Chronic Reflux.  She stated that her reflux has improved on carafate.  She is taking this regularly.  She stated she is not eating regularly because she is not hungry, she continues to drink soda and sweet teas as well. She admits to drinking while eating.  Her daughter was in the room and admitted she is not active.  The patient stated she does not exercise regularly because of her aching joints in her legs.         Review of Systems  Ophthalmic ROS: positive for - blurry vision    History  Past Medical History:   Diagnosis Date   • Anxiety    • Arben-tachy syndrome    • Bradycardia    • Breath shortness    • Cardiac pacemaker     11/09 MED [facility]   • Chest pain    • Chronic fatigue    • Depression    • Diabetes mellitus    • Dizziness    • Fatigue    • Follow-up exam    • Heart burn    • Hypercoagulable state, primary     LUPUS ANTI-COAG   • Hyperlipidemia    • Hypertension    • Liver disease    • Shortness of breath    • Sleep apnea    • Stroke    • Syncope    • Tachy-arben syndrome      Past Surgical History:   Procedure Laterality Date   • APPENDECTOMY     • CATARACT EXTRACTION     • CHOLECYSTECTOMY     • HERNIA REPAIR     • HYSTERECTOMY     • PACEMAKER IMPLANTATION     • TRAM-EN-Y PROCEDURE  2002    Dr Dahiana Colon [place]   • SPLENECTOMY       Family History   Problem Relation Age of Onset   • Coronary artery disease Mother    • Hyperlipidemia Mother    • Anemia Mother    • Cervical cancer Mother    • Kidney failure Father    • Heart failure Father    • Fibromyalgia Sister    • Anemia Sister    • Clotting disorder Sister    • Alcohol abuse  Brother    • Cancer Maternal Grandmother    • Diabetes Maternal Grandmother    • Heart failure Maternal Grandfather    • No Known Problems Paternal Grandmother    • No Known Problems Paternal Grandfather    • Colon cancer Brother      Social History   Substance Use Topics   • Smoking status: Never Smoker   • Smokeless tobacco: Never Used   • Alcohol use No       (Not in a hospital admission)  Allergies:  Insect extract; Bee venom; Other; Percocet [oxycodone-acetaminophen]; Ultram [tramadol hcl]; and Hydrocodone-acetaminophen    Objective     Vital Signs  Temp:  [98.2 °F (36.8 °C)] 98.2 °F (36.8 °C)  Heart Rate:  [62] 62  BP: (130)/(78) 130/78  Body mass index is 37.06 kg/(m^2).  Last 3 weights    06/15/17  0926   Weight: 236 lb 9.6 oz (107 kg)       Physical Exam:     Respiratory: appears well, vitals normal, no respiratory distress, acyanotic, normal RR, chest clear, no wheezing, crepitations, rhonchi, normal symmetric air entry  Cardiovascular: Regular rate and rhythm, S1, S2 normal, no murmur, click, rub or gallop  GI: Soft, non-tender, normal bowel sounds; no bruits, organomegaly or masses.  Abnormal shape: obese  Musculoskeletal: bilateral edema in legs  Neurologic: alert, oriented, normal speech, no focal findings or movement disorder noted       Results Review:   None        Assessment/Plan   Encounter Diagnoses   Name Primary?   • History of Yamilex-en-Y gastric bypass Yes   • History of ulcer disease    • Chronic GERD    • Body mass index 36.0-36.9, adult    • Other specified diabetes mellitus with other specified complication         1.  GERD -  She is instructed to continue her ant acid medications as well as carafate.  She is to avoid sodas, and sweetened teas as well.  She is to attempt to consume at least 3 meals a day as instructed.  I have also encouraged her to increase her activity.  I will reorder vitamin panel of D, Calcium, FA, and iron today.  A total of 25 minutes was spent face-to-face with the  patient during this encounter and over half of that time was spent on counseling and coordination of care.  We discussed in depth the importance of following the appropriate behavior of having a gastric bypass which includes not drinking while eating, avoiding sodas because they can cause an increased risks of osteoporosis.  Have also recommended that she take her calcium at least 3 times a day and a multivitamin once a day.  Have encouraged her to increase her physical activity.  I also educated the patient about attempting to change these behaviors to decrease or reverse her comorbid conditions such as diabetes and hypertension.  I explained that she needs to attempt to try and change these behaviors and noted to see these results.  Have also explained that she is welcome to attend our support groups which are held monthly.  I have informed the patient that continued in appropriate behavior can lead to injury to herself and overall health and noncompliance can compromise her continued care with our program.  She is to follow-up with our program in 6 months.  I discussed the patients findings and my recommendations with patient and daughter.     Dr. Thomas Yates MD [facility]    06/15/17  10:25 AM  Patient Care Team:  Zora Jackson MD as PCP - General  Zora Jackson MD as PCP - Family Medicine  Ronny Lowe MD as Cardiologist (Cardiology)

## 2017-06-16 ENCOUNTER — RESULTS ENCOUNTER (OUTPATIENT)
Dept: BARIATRICS/WEIGHT MGMT | Facility: CLINIC | Age: 67
End: 2017-06-16

## 2017-06-16 DIAGNOSIS — E55.9 VITAMIN D DEFICIENCY: ICD-10-CM

## 2017-06-16 DIAGNOSIS — E66.01 MORBID OBESITY DUE TO EXCESS CALORIES (HCC): ICD-10-CM

## 2017-06-16 DIAGNOSIS — Z98.84 HISTORY OF ROUX-EN-Y GASTRIC BYPASS: ICD-10-CM

## 2017-06-16 DIAGNOSIS — I10 HYPERTENSION, UNCONTROLLED: ICD-10-CM

## 2017-06-27 RX ORDER — WARFARIN SODIUM 5 MG/1
10 TABLET ORAL DAILY
Qty: 60 TABLET | Refills: 3 | Status: SHIPPED | OUTPATIENT
Start: 2017-06-27 | End: 2018-01-29 | Stop reason: SDUPTHER

## 2017-06-27 RX ORDER — WARFARIN SODIUM 5 MG/1
10 TABLET ORAL DAILY
COMMUNITY
Start: 2016-06-17 | End: 2017-06-27 | Stop reason: SDUPTHER

## 2017-07-14 ENCOUNTER — RESULTS ENCOUNTER (OUTPATIENT)
Dept: BARIATRICS/WEIGHT MGMT | Facility: CLINIC | Age: 67
End: 2017-07-14

## 2017-07-14 DIAGNOSIS — E66.01 MORBID OBESITY DUE TO EXCESS CALORIES (HCC): ICD-10-CM

## 2017-07-14 DIAGNOSIS — Z98.84 HISTORY OF ROUX-EN-Y GASTRIC BYPASS: ICD-10-CM

## 2017-07-14 DIAGNOSIS — E55.9 VITAMIN D DEFICIENCY: ICD-10-CM

## 2017-07-14 DIAGNOSIS — I10 HYPERTENSION, UNCONTROLLED: ICD-10-CM

## 2017-07-17 RX ORDER — RANITIDINE 150 MG/1
TABLET ORAL
COMMUNITY
Start: 2017-04-17 | End: 2018-08-07 | Stop reason: SDUPTHER

## 2017-07-17 RX ORDER — LOSARTAN POTASSIUM AND HYDROCHLOROTHIAZIDE 25; 100 MG/1; MG/1
1 TABLET ORAL
COMMUNITY
End: 2018-02-05 | Stop reason: SDUPTHER

## 2017-07-17 RX ORDER — LEVOTHYROXINE SODIUM 0.1 MG/1
TABLET ORAL
COMMUNITY
End: 2018-08-07 | Stop reason: SDUPTHER

## 2017-07-17 RX ORDER — ESCITALOPRAM OXALATE 10 MG/1
TABLET ORAL
COMMUNITY
Start: 2017-06-26 | End: 2018-08-07 | Stop reason: SDUPTHER

## 2017-07-17 RX ORDER — AMLODIPINE BESYLATE 5 MG/1
TABLET ORAL
COMMUNITY
Start: 2017-06-17 | End: 2018-01-31 | Stop reason: ALTCHOICE

## 2017-07-17 RX ORDER — FUROSEMIDE 20 MG/1
TABLET ORAL
COMMUNITY
Start: 2017-06-26 | End: 2017-09-15 | Stop reason: ALTCHOICE

## 2017-07-20 ENCOUNTER — APPOINTMENT (OUTPATIENT)
Dept: GENERAL RADIOLOGY | Age: 67
End: 2017-07-20
Payer: MEDICARE

## 2017-07-20 ENCOUNTER — APPOINTMENT (OUTPATIENT)
Dept: CT IMAGING | Age: 67
End: 2017-07-20
Payer: MEDICARE

## 2017-07-20 ENCOUNTER — HOSPITAL ENCOUNTER (EMERGENCY)
Age: 67
Discharge: HOME | End: 2017-07-20
Payer: MEDICARE

## 2017-07-20 VITALS
RESPIRATION RATE: 16 BRPM | TEMPERATURE: 98.3 F | OXYGEN SATURATION: 97 % | HEIGHT: 67 IN | HEART RATE: 60 BPM | DIASTOLIC BLOOD PRESSURE: 85 MMHG | WEIGHT: 235 LBS | SYSTOLIC BLOOD PRESSURE: 151 MMHG | BODY MASS INDEX: 36.88 KG/M2

## 2017-07-20 DIAGNOSIS — S09.90XA HEAD TRAUMA, INITIAL ENCOUNTER: ICD-10-CM

## 2017-07-20 DIAGNOSIS — M25.532 LEFT WRIST PAIN: ICD-10-CM
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
## 2017-07-20 LAB
ALBUMIN SERPL-MCNC: 3.9 G/DL (ref 3.5–5.2)
ALP BLD-CCNC: 84 U/L (ref 35–104)
ALT SERPL-CCNC: 10 U/L (ref 5–33)
ANION GAP SERPL CALCULATED.3IONS-SCNC: 12 MMOL/L (ref 7–19)
APTT: 24 SEC (ref 26–36.2)
AST SERPL-CCNC: 24 U/L (ref 5–32)
BASOPHILS ABSOLUTE: 0 K/UL (ref 0–0.2)
BASOPHILS RELATIVE PERCENT: 0.5 % (ref 0–1)
BILIRUB SERPL-MCNC: 0.6 MG/DL (ref 0.2–1.2)
BUN BLDV-MCNC: 19 MG/DL (ref 8–23)
CALCIUM SERPL-MCNC: 9 MG/DL (ref 8.8–10.2)
CHLORIDE BLD-SCNC: 105 MMOL/L (ref 98–111)
CO2: 26 MMOL/L (ref 22–29)
CREAT SERPL-MCNC: 0.8 MG/DL (ref 0.5–0.9)
EOSINOPHILS ABSOLUTE: 0 K/UL (ref 0–0.6)
EOSINOPHILS RELATIVE PERCENT: 0.7 % (ref 0–5)
GFR NON-AFRICAN AMERICAN: >60
GLUCOSE BLD-MCNC: 87 MG/DL (ref 74–109)
HCT VFR BLD CALC: 37.9 % (ref 37–47)
HEMOGLOBIN: 12.2 G/DL (ref 12–16)
INR BLD: 1.01 (ref 0.88–1.18)
LYMPHOCYTES ABSOLUTE: 1.5 K/UL (ref 1.1–4.5)
LYMPHOCYTES RELATIVE PERCENT: 24.8 % (ref 20–40)
MCH RBC QN AUTO: 29.6 PG (ref 27–31)
MCHC RBC AUTO-ENTMCNC: 32.2 G/DL (ref 33–37)
MCV RBC AUTO: 92 FL (ref 81–99)
MONOCYTES ABSOLUTE: 0.7 K/UL (ref 0–0.9)
MONOCYTES RELATIVE PERCENT: 11.1 % (ref 0–10)
NEUTROPHILS ABSOLUTE: 3.7 K/UL (ref 1.5–7.5)
NEUTROPHILS RELATIVE PERCENT: 62.7 % (ref 50–65)
PDW BLD-RTO: 15.4 % (ref 11.5–14.5)
PLATELET # BLD: 180 K/UL (ref 130–400)
PMV BLD AUTO: 12.3 FL (ref 9.4–12.3)
POTASSIUM SERPL-SCNC: 4.1 MMOL/L (ref 3.5–5)
PROTHROMBIN TIME: 13.2 SEC (ref 12–14.6)
RBC # BLD: 4.12 M/UL (ref 4.2–5.4)
SODIUM BLD-SCNC: 143 MMOL/L (ref 136–145)
TOTAL PROTEIN: 8 G/DL (ref 6.6–8.7)
WBC # BLD: 5.9 K/UL (ref 4.8–10.8)

## 2017-07-20 PROCEDURE — 80053 COMPREHEN METABOLIC PANEL: CPT

## 2017-07-20 PROCEDURE — 29125 APPL SHORT ARM SPLINT STATIC: CPT | Performed by: PHYSICIAN ASSISTANT

## 2017-07-20 PROCEDURE — 73110 X-RAY EXAM OF WRIST: CPT

## 2017-07-20 PROCEDURE — 85025 COMPLETE CBC W/AUTO DIFF WBC: CPT

## 2017-07-20 PROCEDURE — 70450 CT HEAD/BRAIN W/O DYE: CPT

## 2017-07-20 PROCEDURE — 72125 CT NECK SPINE W/O DYE: CPT

## 2017-07-20 PROCEDURE — 99283 EMERGENCY DEPT VISIT LOW MDM: CPT | Performed by: PHYSICIAN ASSISTANT

## 2017-07-20 PROCEDURE — 6370000000 HC RX 637 (ALT 250 FOR IP): Performed by: PHYSICIAN ASSISTANT

## 2017-07-20 PROCEDURE — 85610 PROTHROMBIN TIME: CPT

## 2017-07-20 PROCEDURE — 73030 X-RAY EXAM OF SHOULDER: CPT

## 2017-07-20 PROCEDURE — 99284 EMERGENCY DEPT VISIT MOD MDM: CPT

## 2017-07-20 PROCEDURE — 29125 APPL SHORT ARM SPLINT STATIC: CPT

## 2017-07-20 PROCEDURE — 85730 THROMBOPLASTIN TIME PARTIAL: CPT

## 2017-07-20 PROCEDURE — 73090 X-RAY EXAM OF FOREARM: CPT

## 2017-07-20 PROCEDURE — 6360000002 HC RX W HCPCS: Performed by: PHYSICIAN ASSISTANT

## 2017-07-20 PROCEDURE — 36415 COLL VENOUS BLD VENIPUNCTURE: CPT

## 2017-07-20 RX ORDER — HYDROCODONE BITARTRATE AND ACETAMINOPHEN 7.5; 325 MG/1; MG/1
1 TABLET ORAL EVERY 6 HOURS PRN
Qty: 15 TABLET | Refills: 0 | Status: SHIPPED | OUTPATIENT
Start: 2017-07-20 | End: 2017-08-18 | Stop reason: ALTCHOICE

## 2017-07-20 RX ORDER — SUCRALFATE ORAL 1 G/10ML
1 SUSPENSION ORAL 4 TIMES DAILY
COMMUNITY
End: 2018-07-19 | Stop reason: SDUPTHER

## 2017-07-20 RX ORDER — HYDROCODONE BITARTRATE AND ACETAMINOPHEN 7.5; 325 MG/1; MG/1
1 TABLET ORAL ONCE
Status: COMPLETED | OUTPATIENT
Start: 2017-07-20 | End: 2017-07-20

## 2017-07-20 RX ORDER — HYDROCODONE BITARTRATE AND ACETAMINOPHEN 7.5; 325 MG/1; MG/1
1 TABLET ORAL EVERY 6 HOURS PRN
Qty: 1 TABLET | Refills: 0 | Status: SHIPPED | OUTPATIENT
Start: 2017-07-20 | End: 2017-07-20

## 2017-07-20 RX ORDER — ONDANSETRON 4 MG/1
4 TABLET, ORALLY DISINTEGRATING ORAL ONCE
Status: COMPLETED | OUTPATIENT
Start: 2017-07-20 | End: 2017-07-20

## 2017-07-20 RX ORDER — ONDANSETRON 4 MG/1
4 TABLET, ORALLY DISINTEGRATING ORAL EVERY 8 HOURS PRN
Qty: 20 TABLET | Refills: 0 | Status: SHIPPED | OUTPATIENT
Start: 2017-07-20 | End: 2017-07-21

## 2017-07-20 RX ADMIN — HYDROCODONE BITARTRATE AND ACETAMINOPHEN 1 TABLET: 7.5; 325 TABLET ORAL at 11:20

## 2017-07-20 RX ADMIN — ONDANSETRON 4 MG: 4 TABLET, ORALLY DISINTEGRATING ORAL at 11:20

## 2017-07-20 ASSESSMENT — ENCOUNTER SYMPTOMS
VOMITING: 0
BACK PAIN: 0
ABDOMINAL PAIN: 0
CONSTIPATION: 0
WHEEZING: 0
SHORTNESS OF BREATH: 0
COUGH: 0
DIARRHEA: 0
NAUSEA: 0

## 2017-07-20 ASSESSMENT — PAIN DESCRIPTION - ORIENTATION: ORIENTATION: LEFT

## 2017-07-20 ASSESSMENT — PAIN SCALES - GENERAL
PAINLEVEL_OUTOF10: 10
PAINLEVEL_OUTOF10: 1

## 2017-07-20 ASSESSMENT — PAIN DESCRIPTION - PAIN TYPE: TYPE: ACUTE PAIN

## 2017-07-20 ASSESSMENT — PAIN DESCRIPTION - LOCATION: LOCATION: ARM

## 2017-07-21 RX ORDER — ONDANSETRON 4 MG/1
4 TABLET, FILM COATED ORAL EVERY 8 HOURS PRN
Qty: 30 TABLET | Refills: 1 | Status: SHIPPED | OUTPATIENT
Start: 2017-07-21 | End: 2017-07-24 | Stop reason: SDUPTHER

## 2017-07-24 RX ORDER — ONDANSETRON 4 MG/1
4 TABLET, FILM COATED ORAL EVERY 8 HOURS PRN
Qty: 30 TABLET | Refills: 1 | Status: SHIPPED | OUTPATIENT
Start: 2017-07-24 | End: 2017-07-25 | Stop reason: SDUPTHER

## 2017-07-25 RX ORDER — ONDANSETRON 4 MG/1
4 TABLET, FILM COATED ORAL EVERY 8 HOURS PRN
Qty: 30 TABLET | Refills: 1 | Status: SHIPPED | OUTPATIENT
Start: 2017-07-25 | End: 2017-12-04 | Stop reason: SDUPTHER

## 2017-08-01 ENCOUNTER — TELEPHONE (OUTPATIENT)
Dept: INTERNAL MEDICINE | Age: 67
End: 2017-08-01
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
## 2017-08-01 DIAGNOSIS — M79.602 ARM PAIN, LEFT: ICD-10-CM

## 2017-08-04 ENCOUNTER — CLINICAL SUPPORT (OUTPATIENT)
Dept: CARDIOLOGY | Facility: CLINIC | Age: 67
End: 2017-08-04

## 2017-08-04 DIAGNOSIS — I49.5 SA NODE DYSFUNCTION (HCC): ICD-10-CM

## 2017-08-04 DIAGNOSIS — Z95.0 CARDIAC PACEMAKER IN SITU: Primary | ICD-10-CM

## 2017-08-04 DIAGNOSIS — I49.5 BRADY-TACHY SYNDROME (HCC): ICD-10-CM

## 2017-08-04 NOTE — PROGRESS NOTES
Dual Chamber Pacemaker Evaluation Report  [facility]    August 4, 2017    Primary Cardiologist: Mynor  : Medtronic Model: EnRhythm  Implant date: November 13, 2009    Reason for evaluation: battery check  Indication for pacemaker: sinus node dysfunction and tachy-redd syndrome    Measurements  Atrial sensing - P wave: 2.3 mV  Atrial threshold: n/r  Atrial lead impedance: 520 ohms  Ventricular sensing - R wave: 4.7 mV  Ventricular threshold: n/r  Ventricular lead impedance:   520 ohms     Diagnostic Data  Atrial paced: 87.1 %  Ventricular paced: 0.4 %  Other: 13 SVT episodes- longest 16 seconds, max v rate 200 bpm  Battery status: intensify follow up   2.93V      Final Parameters  Mode:  AAIR+  Lower rate: 60 bpm   Upper rate: 130 bpm  AV Delay: paced- 180 ms  Sensed-150 ms  Atrial - Amplitude: 2 V   Pulse width: 0.4 ms   Sensitivity: 0.6 mV     Ventricular - Amplitude: 3 V  Pulse width: 0.4 ms  Sensitivity: 0.9 mV    Changes made: n/a  Conclusions: normal pacemaker function and stable sensing thresholds    Follow up: 2 months

## 2017-08-11 ENCOUNTER — RESULTS ENCOUNTER (OUTPATIENT)
Dept: BARIATRICS/WEIGHT MGMT | Facility: CLINIC | Age: 67
End: 2017-08-11

## 2017-08-11 DIAGNOSIS — E66.01 MORBID OBESITY DUE TO EXCESS CALORIES (HCC): ICD-10-CM

## 2017-08-11 DIAGNOSIS — E55.9 VITAMIN D DEFICIENCY: ICD-10-CM

## 2017-08-11 DIAGNOSIS — Z98.84 HISTORY OF ROUX-EN-Y GASTRIC BYPASS: ICD-10-CM

## 2017-08-11 DIAGNOSIS — I10 HYPERTENSION, UNCONTROLLED: ICD-10-CM

## 2017-08-11 LAB
ALBUMIN SERPL-MCNC: 4 G/DL (ref 3.5–5.2)
ALP BLD-CCNC: 85 U/L (ref 35–104)
ALT SERPL-CCNC: 9 U/L (ref 5–33)
ANION GAP SERPL CALCULATED.3IONS-SCNC: 17 MMOL/L (ref 7–19)
AST SERPL-CCNC: 22 U/L (ref 5–32)
BASOPHILS ABSOLUTE: 0 K/UL (ref 0–0.2)
BASOPHILS RELATIVE PERCENT: 0.6 % (ref 0–1)
BILIRUB SERPL-MCNC: 0.5 MG/DL (ref 0.2–1.2)
BUN BLDV-MCNC: 18 MG/DL (ref 8–23)
CALCIUM SERPL-MCNC: 9.5 MG/DL (ref 8.8–10.2)
CHLORIDE BLD-SCNC: 105 MMOL/L (ref 98–111)
CHOLESTEROL, TOTAL: 199 MG/DL (ref 160–199)
CO2: 22 MMOL/L (ref 22–29)
CREAT SERPL-MCNC: 1 MG/DL (ref 0.5–0.9)
EOSINOPHILS ABSOLUTE: 0.1 K/UL (ref 0–0.6)
EOSINOPHILS RELATIVE PERCENT: 1.4 % (ref 0–5)
GFR NON-AFRICAN AMERICAN: 55
GLUCOSE BLD-MCNC: 77 MG/DL (ref 74–109)
HBA1C MFR BLD: 6 %
HCT VFR BLD CALC: 35.7 % (ref 37–47)
HDLC SERPL-MCNC: 93 MG/DL (ref 65–121)
HEMOGLOBIN: 11.3 G/DL (ref 12–16)
LDL CHOLESTEROL CALCULATED: 92 MG/DL
LYMPHOCYTES ABSOLUTE: 1.9 K/UL (ref 1.1–4.5)
LYMPHOCYTES RELATIVE PERCENT: 37.9 % (ref 20–40)
MCH RBC QN AUTO: 29 PG (ref 27–31)
MCHC RBC AUTO-ENTMCNC: 31.7 G/DL (ref 33–37)
MCV RBC AUTO: 91.8 FL (ref 81–99)
MONOCYTES ABSOLUTE: 0.6 K/UL (ref 0–0.9)
MONOCYTES RELATIVE PERCENT: 11.2 % (ref 0–10)
NEUTROPHILS ABSOLUTE: 2.4 K/UL (ref 1.5–7.5)
NEUTROPHILS RELATIVE PERCENT: 48.7 % (ref 50–65)
PDW BLD-RTO: 15.6 % (ref 11.5–14.5)
PLATELET # BLD: 167 K/UL (ref 130–400)
PMV BLD AUTO: 12.9 FL (ref 9.4–12.3)
POTASSIUM SERPL-SCNC: 3.7 MMOL/L (ref 3.5–5)
RBC # BLD: 3.89 M/UL (ref 4.2–5.4)
SODIUM BLD-SCNC: 144 MMOL/L (ref 136–145)
TOTAL PROTEIN: 7.9 G/DL (ref 6.6–8.7)
TRIGL SERPL-MCNC: 71 MG/DL (ref 150–199)
TSH SERPL DL<=0.05 MIU/L-ACNC: 4.66 UIU/ML (ref 0.27–4.2)
VITAMIN B-12: 302 PG/ML (ref 211–946)
VITAMIN D 25-HYDROXY: 20 NG/ML
WBC # BLD: 5 K/UL (ref 4.8–10.8)

## 2017-08-18 ENCOUNTER — HOSPITAL ENCOUNTER (OUTPATIENT)
Dept: GENERAL RADIOLOGY | Age: 67
Discharge: HOME | End: 2017-08-18
Payer: MEDICARE

## 2017-08-18 ENCOUNTER — OFFICE VISIT (OUTPATIENT)
Dept: INTERNAL MEDICINE | Age: 67
End: 2017-08-18
Payer: MEDICARE

## 2017-08-18 ENCOUNTER — TRANSCRIBE ORDERS (OUTPATIENT)
Dept: ADMINISTRATIVE | Facility: HOSPITAL | Age: 67
End: 2017-08-18

## 2017-08-18 VITALS
HEART RATE: 73 BPM | SYSTOLIC BLOOD PRESSURE: 144 MMHG | HEIGHT: 67 IN | WEIGHT: 239.5 LBS | BODY MASS INDEX: 37.59 KG/M2 | OXYGEN SATURATION: 98 % | DIASTOLIC BLOOD PRESSURE: 82 MMHG

## 2017-08-18 DIAGNOSIS — E11.9 DIET-CONTROLLED DIABETES MELLITUS (HCC): ICD-10-CM

## 2017-08-18 DIAGNOSIS — H35.029: ICD-10-CM

## 2017-08-18 DIAGNOSIS — Z12.31 ENCOUNTER FOR SCREENING MAMMOGRAM FOR MALIGNANT NEOPLASM OF BREAST: Primary | ICD-10-CM

## 2017-08-18 DIAGNOSIS — F32.A DEPRESSION, UNSPECIFIED DEPRESSION TYPE: ICD-10-CM

## 2017-08-18 DIAGNOSIS — E55.9 VITAMIN D DEFICIENCY: ICD-10-CM

## 2017-08-18 DIAGNOSIS — Z12.31 SCREENING MAMMOGRAM, ENCOUNTER FOR: ICD-10-CM

## 2017-08-18 DIAGNOSIS — I10 ESSENTIAL HYPERTENSION: ICD-10-CM

## 2017-08-18 DIAGNOSIS — M79.2 NEUROPATHIC PAIN: ICD-10-CM

## 2017-08-18 DIAGNOSIS — Z12.11 SCREENING FOR COLON CANCER: ICD-10-CM

## 2017-08-18 DIAGNOSIS — Z79.01 ANTICOAGULATED: ICD-10-CM

## 2017-08-18 DIAGNOSIS — Z98.84 S/P GASTRIC BYPASS: ICD-10-CM

## 2017-08-18 DIAGNOSIS — I25.10 CORONARY ARTERY DISEASE INVOLVING NATIVE HEART WITHOUT ANGINA PECTORIS, UNSPECIFIED VESSEL OR LESION TYPE: ICD-10-CM

## 2017-08-18 DIAGNOSIS — J45.20 ASTHMATIC BRONCHITIS, MILD INTERMITTENT, UNCOMPLICATED: ICD-10-CM

## 2017-08-18 DIAGNOSIS — E03.9 HYPOTHYROIDISM, UNSPECIFIED TYPE: ICD-10-CM

## 2017-08-18 DIAGNOSIS — E11.40 TYPE 2 DIABETES MELLITUS WITH DIABETIC NEUROPATHY, WITHOUT LONG-TERM CURRENT USE OF INSULIN (HCC): ICD-10-CM

## 2017-08-18 DIAGNOSIS — M54.9 BACK PAIN, UNSPECIFIED BACK LOCATION, UNSPECIFIED BACK PAIN LATERALITY, UNSPECIFIED CHRONICITY: ICD-10-CM

## 2017-08-18 DIAGNOSIS — Z00.00 ROUTINE GENERAL MEDICAL EXAMINATION AT A HEALTH CARE FACILITY: Primary | ICD-10-CM

## 2017-08-18 DIAGNOSIS — K21.9 GASTROESOPHAGEAL REFLUX DISEASE WITHOUT ESOPHAGITIS: ICD-10-CM

## 2017-08-18 LAB
CREATININE URINE: 23.6 MG/DL (ref 4.2–622)
INR BLD: 1.13 (ref 0.88–1.18)
MICROALBUMIN UR-MCNC: <1.2 MG/DL (ref 0–19)
MICROALBUMIN/CREAT UR-RTO: NORMAL MG/G
PROTHROMBIN TIME: 14.4 SEC (ref 12–14.6)

## 2017-08-18 PROCEDURE — G8400 PT W/DXA NO RESULTS DOC: HCPCS | Performed by: INTERNAL MEDICINE

## 2017-08-18 PROCEDURE — 3017F COLORECTAL CA SCREEN DOC REV: CPT | Performed by: INTERNAL MEDICINE

## 2017-08-18 PROCEDURE — 99214 OFFICE O/P EST MOD 30 MIN: CPT | Performed by: INTERNAL MEDICINE

## 2017-08-18 PROCEDURE — 4040F PNEUMOC VAC/ADMIN/RCVD: CPT | Performed by: INTERNAL MEDICINE

## 2017-08-18 PROCEDURE — 1090F PRES/ABSN URINE INCON ASSESS: CPT | Performed by: INTERNAL MEDICINE

## 2017-08-18 PROCEDURE — 1036F TOBACCO NON-USER: CPT | Performed by: INTERNAL MEDICINE

## 2017-08-18 PROCEDURE — 1123F ACP DISCUSS/DSCN MKR DOCD: CPT | Performed by: INTERNAL MEDICINE

## 2017-08-18 PROCEDURE — G8417 CALC BMI ABV UP PARAM F/U: HCPCS | Performed by: INTERNAL MEDICINE

## 2017-08-18 PROCEDURE — G8598 ASA/ANTIPLAT THER USED: HCPCS | Performed by: INTERNAL MEDICINE

## 2017-08-18 PROCEDURE — 96372 THER/PROPH/DIAG INJ SC/IM: CPT | Performed by: INTERNAL MEDICINE

## 2017-08-18 PROCEDURE — 72100 X-RAY EXAM L-S SPINE 2/3 VWS: CPT

## 2017-08-18 PROCEDURE — 73502 X-RAY EXAM HIP UNI 2-3 VIEWS: CPT

## 2017-08-18 PROCEDURE — 3046F HEMOGLOBIN A1C LEVEL >9.0%: CPT | Performed by: INTERNAL MEDICINE

## 2017-08-18 PROCEDURE — 3014F SCREEN MAMMO DOC REV: CPT | Performed by: INTERNAL MEDICINE

## 2017-08-18 PROCEDURE — G0438 PPPS, INITIAL VISIT: HCPCS | Performed by: INTERNAL MEDICINE

## 2017-08-18 PROCEDURE — G8427 DOCREV CUR MEDS BY ELIG CLIN: HCPCS | Performed by: INTERNAL MEDICINE

## 2017-08-18 RX ORDER — HYDROCODONE BITARTRATE AND ACETAMINOPHEN 5; 325 MG/1; MG/1
TABLET ORAL
COMMUNITY
Start: 2017-06-04 | End: 2017-08-18 | Stop reason: ALTCHOICE

## 2017-08-18 ASSESSMENT — ANXIETY QUESTIONNAIRES: GAD7 TOTAL SCORE: 0

## 2017-08-18 ASSESSMENT — PATIENT HEALTH QUESTIONNAIRE - PHQ9: SUM OF ALL RESPONSES TO PHQ QUESTIONS 1-9: 0

## 2017-08-18 ASSESSMENT — LIFESTYLE VARIABLES: HOW OFTEN DO YOU HAVE A DRINK CONTAINING ALCOHOL: 0

## 2017-08-25 ENCOUNTER — APPOINTMENT (OUTPATIENT)
Dept: MAMMOGRAPHY | Facility: HOSPITAL | Age: 67
End: 2017-08-25
Attending: INTERNAL MEDICINE

## 2017-08-28 ENCOUNTER — TELEPHONE (OUTPATIENT)
Dept: INTERNAL MEDICINE | Age: 67
End: 2017-08-28

## 2017-09-01 ENCOUNTER — HOSPITAL ENCOUNTER (OUTPATIENT)
Dept: MAMMOGRAPHY | Facility: HOSPITAL | Age: 67
Discharge: HOME | End: 2017-09-01
Attending: INTERNAL MEDICINE | Admitting: INTERNAL MEDICINE

## 2017-09-01 DIAGNOSIS — Z12.31 ENCOUNTER FOR SCREENING MAMMOGRAM FOR MALIGNANT NEOPLASM OF BREAST: ICD-10-CM

## 2017-09-01 PROCEDURE — G0202 SCR MAMMO BI INCL CAD: HCPCS

## 2017-09-01 PROCEDURE — 77063 BREAST TOMOSYNTHESIS BI: CPT

## 2017-09-02 ASSESSMENT — ENCOUNTER SYMPTOMS
EYE ITCHING: 0
COLOR CHANGE: 0
COUGH: 0
SHORTNESS OF BREATH: 1
WHEEZING: 0
EYE PAIN: 0
PHOTOPHOBIA: 0
EYE REDNESS: 0
EYE DISCHARGE: 0

## 2017-09-06 ENCOUNTER — TELEPHONE (OUTPATIENT)
Dept: INTERNAL MEDICINE | Age: 67
End: 2017-09-06

## 2017-09-08 ENCOUNTER — RESULTS ENCOUNTER (OUTPATIENT)
Dept: BARIATRICS/WEIGHT MGMT | Facility: CLINIC | Age: 67
End: 2017-09-08

## 2017-09-08 DIAGNOSIS — Z98.84 HISTORY OF ROUX-EN-Y GASTRIC BYPASS: ICD-10-CM

## 2017-09-08 DIAGNOSIS — I10 HYPERTENSION, UNCONTROLLED: ICD-10-CM

## 2017-09-08 DIAGNOSIS — E55.9 VITAMIN D DEFICIENCY: ICD-10-CM

## 2017-09-08 DIAGNOSIS — E66.01 MORBID OBESITY DUE TO EXCESS CALORIES (HCC): ICD-10-CM

## 2017-09-13 ENCOUNTER — TELEPHONE (OUTPATIENT)
Dept: INTERNAL MEDICINE | Age: 67
End: 2017-09-13

## 2017-09-15 ENCOUNTER — HOSPITAL ENCOUNTER (OUTPATIENT)
Dept: GENERAL RADIOLOGY | Age: 67
Discharge: HOME | End: 2017-09-15
Payer: MEDICARE

## 2017-09-15 ENCOUNTER — OFFICE VISIT (OUTPATIENT)
Dept: INTERNAL MEDICINE | Age: 67
End: 2017-09-15
Payer: MEDICARE

## 2017-09-15 VITALS
OXYGEN SATURATION: 98 % | WEIGHT: 239 LBS | HEIGHT: 67 IN | SYSTOLIC BLOOD PRESSURE: 138 MMHG | HEART RATE: 74 BPM | BODY MASS INDEX: 37.51 KG/M2 | DIASTOLIC BLOOD PRESSURE: 72 MMHG

## 2017-09-15 DIAGNOSIS — R35.0 URINARY FREQUENCY: ICD-10-CM

## 2017-09-15 DIAGNOSIS — M79.605 BILATERAL LEG PAIN: ICD-10-CM

## 2017-09-15 DIAGNOSIS — Z23 NEEDS FLU SHOT: ICD-10-CM

## 2017-09-15 DIAGNOSIS — M25.561 RIGHT KNEE PAIN, UNSPECIFIED CHRONICITY: ICD-10-CM

## 2017-09-15 DIAGNOSIS — M25.572 LEFT ANKLE PAIN, UNSPECIFIED CHRONICITY: ICD-10-CM

## 2017-09-15 DIAGNOSIS — M79.604 BILATERAL LEG PAIN: ICD-10-CM

## 2017-09-15 DIAGNOSIS — R60.9 EDEMA, UNSPECIFIED TYPE: Primary | ICD-10-CM

## 2017-09-15 DIAGNOSIS — Z91.81 AT HIGH RISK FOR FALLS: ICD-10-CM

## 2017-09-15 LAB
BILIRUBIN URINE: NEGATIVE
BLOOD, URINE: NEGATIVE
CLARITY: CLEAR
COLOR: YELLOW
GLUCOSE URINE: NEGATIVE MG/DL
KETONES, URINE: NEGATIVE MG/DL
LEUKOCYTE ESTERASE, URINE: NEGATIVE
NITRITE, URINE: NEGATIVE
PH UA: 6.5
PROTEIN UA: NEGATIVE MG/DL
SPECIFIC GRAVITY UA: 1.01
UROBILINOGEN, URINE: 1 E.U./DL

## 2017-09-15 PROCEDURE — G8598 ASA/ANTIPLAT THER USED: HCPCS | Performed by: INTERNAL MEDICINE

## 2017-09-15 PROCEDURE — 4040F PNEUMOC VAC/ADMIN/RCVD: CPT | Performed by: INTERNAL MEDICINE

## 2017-09-15 PROCEDURE — G0008 ADMIN INFLUENZA VIRUS VAC: HCPCS | Performed by: INTERNAL MEDICINE

## 2017-09-15 PROCEDURE — 73610 X-RAY EXAM OF ANKLE: CPT

## 2017-09-15 PROCEDURE — 90662 IIV NO PRSV INCREASED AG IM: CPT | Performed by: INTERNAL MEDICINE

## 2017-09-15 PROCEDURE — G8427 DOCREV CUR MEDS BY ELIG CLIN: HCPCS | Performed by: INTERNAL MEDICINE

## 2017-09-15 PROCEDURE — 1090F PRES/ABSN URINE INCON ASSESS: CPT | Performed by: INTERNAL MEDICINE

## 2017-09-15 PROCEDURE — 99214 OFFICE O/P EST MOD 30 MIN: CPT | Performed by: INTERNAL MEDICINE

## 2017-09-15 PROCEDURE — G8400 PT W/DXA NO RESULTS DOC: HCPCS | Performed by: INTERNAL MEDICINE

## 2017-09-15 PROCEDURE — 3017F COLORECTAL CA SCREEN DOC REV: CPT | Performed by: INTERNAL MEDICINE

## 2017-09-15 PROCEDURE — 1123F ACP DISCUSS/DSCN MKR DOCD: CPT | Performed by: INTERNAL MEDICINE

## 2017-09-15 PROCEDURE — 1036F TOBACCO NON-USER: CPT | Performed by: INTERNAL MEDICINE

## 2017-09-15 PROCEDURE — 73560 X-RAY EXAM OF KNEE 1 OR 2: CPT

## 2017-09-15 PROCEDURE — 3014F SCREEN MAMMO DOC REV: CPT | Performed by: INTERNAL MEDICINE

## 2017-09-15 PROCEDURE — G8417 CALC BMI ABV UP PARAM F/U: HCPCS | Performed by: INTERNAL MEDICINE

## 2017-09-15 RX ORDER — BUMETANIDE 0.5 MG/1
0.5 TABLET ORAL 2 TIMES DAILY
Qty: 60 TABLET | Refills: 1 | Status: SHIPPED | OUTPATIENT
Start: 2017-09-15 | End: 2018-01-29 | Stop reason: SDUPTHER

## 2017-09-15 RX ORDER — ROPINIROLE 0.5 MG/1
0.5 TABLET, FILM COATED ORAL NIGHTLY PRN
Qty: 30 TABLET | Refills: 3 | Status: SHIPPED | OUTPATIENT
Start: 2017-09-15 | End: 2018-01-29 | Stop reason: SDUPTHER

## 2017-09-15 ASSESSMENT — ENCOUNTER SYMPTOMS
CONSTIPATION: 0
BLOOD IN STOOL: 0
EYE PAIN: 0
CHOKING: 0
PHOTOPHOBIA: 0
ABDOMINAL DISTENTION: 0
RHINORRHEA: 0
COLOR CHANGE: 0
EYE REDNESS: 0
SORE THROAT: 0
STRIDOR: 0
COUGH: 0
SINUS PRESSURE: 0
WHEEZING: 0
SHORTNESS OF BREATH: 0
TROUBLE SWALLOWING: 0
VOICE CHANGE: 0
VOMITING: 0
EYE DISCHARGE: 0
ABDOMINAL PAIN: 0
EYE ITCHING: 0
NAUSEA: 0
DIARRHEA: 0

## 2017-09-15 ASSESSMENT — PATIENT HEALTH QUESTIONNAIRE - PHQ9
SUM OF ALL RESPONSES TO PHQ QUESTIONS 1-9: 2
2. FEELING DOWN, DEPRESSED OR HOPELESS: 1
1. LITTLE INTEREST OR PLEASURE IN DOING THINGS: 1
SUM OF ALL RESPONSES TO PHQ9 QUESTIONS 1 & 2: 2

## 2017-09-19 ENCOUNTER — TELEPHONE (OUTPATIENT)
Dept: INTERNAL MEDICINE | Age: 67
End: 2017-09-19

## 2017-10-06 ENCOUNTER — RESULTS ENCOUNTER (OUTPATIENT)
Dept: BARIATRICS/WEIGHT MGMT | Facility: CLINIC | Age: 67
End: 2017-10-06

## 2017-10-06 DIAGNOSIS — E66.01 MORBID OBESITY DUE TO EXCESS CALORIES (HCC): ICD-10-CM

## 2017-10-06 DIAGNOSIS — E55.9 VITAMIN D DEFICIENCY: ICD-10-CM

## 2017-10-06 DIAGNOSIS — Z98.84 HISTORY OF ROUX-EN-Y GASTRIC BYPASS: ICD-10-CM

## 2017-10-06 DIAGNOSIS — I10 HYPERTENSION, UNCONTROLLED: ICD-10-CM

## 2017-10-12 ENCOUNTER — OFFICE VISIT (OUTPATIENT)
Dept: CARDIOLOGY | Facility: CLINIC | Age: 67
End: 2017-10-12

## 2017-10-12 VITALS
DIASTOLIC BLOOD PRESSURE: 90 MMHG | HEART RATE: 65 BPM | RESPIRATION RATE: 18 BRPM | SYSTOLIC BLOOD PRESSURE: 148 MMHG | HEIGHT: 67 IN | WEIGHT: 239 LBS | BODY MASS INDEX: 37.51 KG/M2

## 2017-10-12 DIAGNOSIS — E13.69 OTHER SPECIFIED DIABETES MELLITUS WITH OTHER SPECIFIED COMPLICATION, UNSPECIFIED LONG TERM INSULIN USE STATUS: ICD-10-CM

## 2017-10-12 DIAGNOSIS — Z95.0 PACEMAKER: ICD-10-CM

## 2017-10-12 DIAGNOSIS — I10 HYPERTENSION, UNCONTROLLED: Primary | ICD-10-CM

## 2017-10-12 DIAGNOSIS — E66.01 MORBID OBESITY DUE TO EXCESS CALORIES (HCC): ICD-10-CM

## 2017-10-12 PROCEDURE — 93000 ELECTROCARDIOGRAM COMPLETE: CPT | Performed by: PHYSICIAN ASSISTANT

## 2017-10-12 PROCEDURE — 99214 OFFICE O/P EST MOD 30 MIN: CPT | Performed by: PHYSICIAN ASSISTANT

## 2017-10-12 RX ORDER — AMLODIPINE BESYLATE 10 MG/1
10 TABLET ORAL DAILY
Qty: 30 TABLET | Refills: 5 | Status: SHIPPED | OUTPATIENT
Start: 2017-10-12 | End: 2018-02-01 | Stop reason: DRUGHIGH

## 2017-10-12 RX ORDER — ESCITALOPRAM OXALATE 10 MG/1
10 TABLET ORAL DAILY
COMMUNITY
End: 2019-12-06 | Stop reason: SDUPTHER

## 2017-10-12 NOTE — PROGRESS NOTES
Subjective:     Encounter Date:10/12/2017      Patient ID: Veronica Stewart is a 67 y.o. female w hx of sinus node dysfunction, tachy-redd syndrome status-post pacemaker, HTN, DM2, morbid obesity, gastroesophageal reflux following open gastric bypass (2004) who [facility] follow-up. The patient reports she continues with dizziness since her last appointment. She states that this occurs particularly with position change. She denies any significant increase in her dizziness. She also endorses chronic bilateral leg swelling. She states this seems to have worsened somewhat since her last appointment. She does endorse chronic, stable dyspnea, unchanged.She also tells me she has chronic fatigue, unchanged, but believes this to be attributed to her fibromyalgia. She denies any chest pain, syncope, palpitations or related.   The patient reports her blood pressure continues to average with elevated readings.   Of note, the patient is compliant with her Coumadin for lupus anticoagulant disorder. She denies any easy bruising or obvious blood loss.    Chief Complaint:  Heart Problem   The current episode started more than 1 year ago. The problem has been resolved. Pertinent negatives include no chest pain, myalgias, nausea or vomiting. Nothing aggravates the symptoms. The treatment provided significant relief.   Hypertension   This is a chronic problem. The current episode started more than 1 year ago. The problem is unchanged. The problem is uncontrolled. Associated symptoms include malaise/fatigue. Pertinent negatives include no chest pain, orthopnea, palpitations, PND or shortness of breath. There are no associated agents to hypertension. Risk factors for coronary artery disease include obesity, diabetes mellitus and post-menopausal state. Past treatments include calcium channel blockers, direct vasodilators, angiotensin blockers and diuretics. The current treatment provides moderate improvement.  Compliance problems include exercise and diet.  There is no history of CAD/MI. There is no history of chronic renal disease.       The following portions of the patient's history were reviewed and updated as appropriate: allergies, current medications, past family history, past medical history, past social history, past surgical history and problem list.    Review of Systems   Constitution: Positive for malaise/fatigue. Negative for weight gain.   Cardiovascular: Positive for dyspnea on exertion (chronic, stable) and leg swelling. Negative for chest pain, claudication, irregular heartbeat, near-syncope, orthopnea, palpitations, paroxysmal nocturnal dyspnea and syncope.   Respiratory: Negative for hemoptysis and shortness of breath.    Hematologic/Lymphatic: Negative for bleeding problem.   Skin: Negative for poor wound healing.   Musculoskeletal: Negative for myalgias.   Gastrointestinal: Negative for melena, nausea and vomiting.   Genitourinary: Negative for hematuria.   Neurological: Positive for dizziness and light-headedness. Negative for focal weakness.   Psychiatric/Behavioral: Negative for memory loss. The patient is not nervous/anxious.    All other systems reviewed and are negative.        ECG 12 Lead  Date/Time: 10/12/2017 9:48 AM  Performed by: NIMO ORELLANA  Authorized by: NIMO ORELLANA   Comparison: compared with previous ECG from 4/10/2017  Similar to previous ECG  Rhythm: paced  Rate: normal  QRS axis: left  Other findings: PRWP  Clinical impression: abnormal ECG               Objective:     Physical Exam   Constitutional: She is oriented to person, place, and time. She appears well-developed and well-nourished.   HENT:   Head: Normocephalic and atraumatic.   Eyes: Conjunctivae and EOM are normal. Pupils are equal, round, and reactive to light.   Neck: Normal range of motion. Neck supple. No JVD present.   Cardiovascular: Normal rate, regular rhythm, S1 normal, S2 normal, normal heart sounds, intact  "distal pulses and normal pulses.    No murmur heard.  Pulmonary/Chest: Effort normal and breath sounds normal. No respiratory distress.   Abdominal: Soft. Bowel sounds are normal. She exhibits no distension.   Musculoskeletal: She exhibits edema (trace BLE).   Neurological: She is alert and oriented to person, place, and time.   Skin: Skin is warm and dry.   Psychiatric: She has a normal mood and affect. Judgment normal.   Vitals reviewed.      /90 (BP Location: Right arm, Patient Position: Sitting, Cuff Size: Adult)  Pulse 65  Resp 18  Ht 67\" (170.2 cm)  Wt 239 lb (108 kg)  Breastfeeding? No  BMI 37.43 kg/m2    Current Outpatient Prescriptions:   •  ADVAIR DISKUS 250-50 MCG/DOSE DISKUS, 2 (two) times a day., Disp: , Rfl:   •  aspirin 81 MG EC tablet, daily., Disp: , Rfl:   •  CARAFATE 1 GM/10ML suspension, Take 10 mL by mouth 4 (Four) Times a Day., Disp: 420 mL, Rfl: 0  •  CloNIDine (CATAPRES) 0.1 MG tablet, Take 0.1 mg by mouth 2 (Two) Times a Day As Needed., Disp: , Rfl:   •  cyanocobalamin 1000 MCG/ML injection, Inject 1,000 mcg into the shoulder, thigh, or buttocks Every 28 (Twenty-Eight) Days., Disp: , Rfl:   •  ergocalciferol (ERGOCALCIFEROL) 18690 UNITS capsule, Take 1 capsule by mouth 1 (One) Time Per Week., Disp: 4 capsule, Rfl: 3  •  escitalopram (LEXAPRO) 10 MG tablet, Take 10 mg by mouth Daily., Disp: , Rfl:   •  furosemide (LASIX) 20 MG tablet, Take 20 mg by mouth Daily., Disp: , Rfl:   •  gabapentin (NEURONTIN) 300 MG capsule, 2 (Two) Times a Day., Disp: , Rfl:   •  levothyroxine (SYNTHROID, LEVOTHROID) 100 MCG tablet, Take  by mouth., Disp: , Rfl:   •  losartan-hydrochlorothiazide (HYZAAR) 100-25 MG per tablet, Take 1 tablet by mouth Daily., Disp: , Rfl:   •  Multiple Vitamins-Minerals (CENTRUM MULTIGUMMIES) chewable tablet, Chew Daily., Disp: , Rfl:   •  pantoprazole (PROTONIX) 40 MG EC tablet, 2 (two) times a day., Disp: , Rfl:   •  potassium chloride (K-DUR,KLOR-CON) 10 MEQ CR tablet, " Take  by mouth., Disp: , Rfl:   •  raNITIdine (ZANTAC) 150 MG tablet, Take 150 mg by mouth Every 12 (Twelve) Hours As Needed for Heartburn., Disp: , Rfl:   •  warfarin (COUMADIN) 5 MG tablet, daily., Disp: , Rfl:   •  amLODIPine (NORVASC) 10 MG tablet, Take 1 tablet by mouth Daily., Disp: 30 tablet, Rfl: 5  Past Medical History:   Diagnosis Date   • Anxiety    • Arben-tachy syndrome    • Bradycardia    • Breath shortness    • Cardiac pacemaker     11/09 [facility]   • Chest pain    • Chronic fatigue    • Depression    • Diabetes mellitus    • Dizziness    • Fatigue    • Follow-up exam    • Heart burn    • Hypercoagulable state, primary     LUPUS ANTI-COAG   • Hyperlipidemia    • Hypertension    • Liver disease    • Shortness of breath    • Sleep apnea    • Stroke    • Syncope    • Tachy-arben syndrome      Past Surgical History:   Procedure Laterality Date   • APPENDECTOMY     • CATARACT EXTRACTION     • CHOLECYSTECTOMY     • HERNIA REPAIR     • HYSTERECTOMY     • OOPHORECTOMY     • PACEMAKER IMPLANTATION     • TRAM-EN-Y PROCEDURE  2002    Dr Dahiana [facility]   • SPLENECTOMY       Allergies   Allergen Reactions   • Insect Extract Allergy Skin Test Anaphylaxis     Bee stings   • Bee Venom    • Other GI Intolerance     Opio[facility], patient started sweating and vomiting     • Percocet [Oxycodone-Acetaminophen] GI Intolerance     Sweating and vomiting    • Ultram [Tramadol Hcl] GI Intolerance     Sweating and vomiting    • Hydrocodone-Acetaminophen Nausea And Vomiting     Sweats, weak, nausea and vomiting     Social History     Social History   • Marital status:      Spouse name: N/A   • Number of children: N/A   • Years of education: N/A     Occupational History   • Not on file.     Social History Main Topics   • Smoking status: Never Smoker   • Smokeless tobacco: Never Used   • Alcohol use No   • Drug use: No   • Sexual activity: Defer     Other Topics Concern   • Not on file     Social History  Narrative     Family History   Problem Relation Age of Onset   • Coronary artery disease Mother    • Hyperlipidemia Mother    • Anemia Mother    • Cervical cancer Mother    • Kidney failure Father    • Heart failure Father    • Fibromyalgia Sister    • Anemia Sister    • Clotting disorder Sister    • Alcohol abuse Brother    • Cancer Maternal Grandmother    • Diabetes Maternal Grandmother    • Heart failure Maternal Grandfather    • No Known Problems Paternal Grandmother    • No Known Problems Paternal Grandfather    • Colon cancer Brother            Assessment:          Diagnosis Plan   1. Hypertension, uncontrolled     2. Pacemaker      history of sinus node dysfunction, tachy-redd syndrome  Nearing end of battery life- upcoming pacemaker check this week   3. Morbid obesity due to excess calories      s/p gastric bypass   4. Other specified diabetes mellitus with other specified complication, unspecified long term insulin use status            Plan:       1. Increase Amlodipine to 10 mg daily. Continue to monitor blood pressure and follow-up with primary care provider as scheduled.  2. Ok to increase Lasix to 40 mg daily on days in which leg swelling is particularly significant.   3. Pacemaker check tomorrow.  4. Instructed patient on slow position changes to help with postural dizziness.  5. Follow-up in 6 months, unless needed sooner or pacemaker results suggest otherwise. Any significant increase in shortness of breath, leg swelling, dizziness or related, please call.  6. Verbalized understanding of instructions

## 2017-10-16 ENCOUNTER — TELEPHONE (OUTPATIENT)
Dept: INTERNAL MEDICINE | Age: 67
End: 2017-10-16

## 2017-10-17 ENCOUNTER — TELEPHONE (OUTPATIENT)
Dept: INTERNAL MEDICINE | Age: 67
End: 2017-10-17

## 2017-10-17 NOTE — TELEPHONE ENCOUNTER
Patient called stating they are breaking out on arms, hands, and legs. She states she itches and burns. Please advise.

## 2017-10-18 ENCOUNTER — HOSPITAL ENCOUNTER (EMERGENCY)
Age: 67
Discharge: HOME | End: 2017-10-18
Payer: MEDICARE

## 2017-10-18 ENCOUNTER — TELEPHONE (OUTPATIENT)
Dept: INTERNAL MEDICINE | Age: 67
End: 2017-10-18

## 2017-10-18 VITALS
DIASTOLIC BLOOD PRESSURE: 99 MMHG | WEIGHT: 240 LBS | SYSTOLIC BLOOD PRESSURE: 177 MMHG | TEMPERATURE: 98 F | OXYGEN SATURATION: 95 % | HEIGHT: 67 IN | RESPIRATION RATE: 17 BRPM | HEART RATE: 63 BPM | BODY MASS INDEX: 37.67 KG/M2

## 2017-10-18 DIAGNOSIS — I10 ESSENTIAL HYPERTENSION: ICD-10-CM

## 2017-10-18 DIAGNOSIS — R21 RASH AND OTHER NONSPECIFIC SKIN ERUPTION: Primary | ICD-10-CM

## 2017-10-18 LAB
CHP ED QC CHECK: NORMAL
GLUCOSE BLD-MCNC: 74 MG/DL
GLUCOSE BLD-MCNC: 74 MG/DL (ref 70–99)
PERFORMED ON: NORMAL

## 2017-10-18 PROCEDURE — 99282 EMERGENCY DEPT VISIT SF MDM: CPT

## 2017-10-18 PROCEDURE — 99282 EMERGENCY DEPT VISIT SF MDM: CPT | Performed by: NURSE PRACTITIONER

## 2017-10-18 PROCEDURE — 82948 REAGENT STRIP/BLOOD GLUCOSE: CPT

## 2017-10-18 PROCEDURE — 6370000000 HC RX 637 (ALT 250 FOR IP): Performed by: NURSE PRACTITIONER

## 2017-10-18 RX ORDER — CLONIDINE HYDROCHLORIDE 0.1 MG/1
0.1 TABLET ORAL ONCE
Status: COMPLETED | OUTPATIENT
Start: 2017-10-18 | End: 2017-10-18

## 2017-10-18 RX ORDER — METHYLPREDNISOLONE 4 MG/1
TABLET ORAL
Qty: 1 KIT | Refills: 0 | Status: SHIPPED | OUTPATIENT
Start: 2017-10-18 | End: 2017-10-24

## 2017-10-18 RX ORDER — PREDNISONE 1 MG/1
20 TABLET ORAL ONCE
Status: COMPLETED | OUTPATIENT
Start: 2017-10-18 | End: 2017-10-18

## 2017-10-18 RX ORDER — DIPHENHYDRAMINE HCL 25 MG
25 CAPSULE ORAL EVERY 6 HOURS PRN
Qty: 30 CAPSULE | Refills: 0 | Status: SHIPPED | OUTPATIENT
Start: 2017-10-18 | End: 2017-10-28

## 2017-10-18 RX ORDER — DIPHENHYDRAMINE HCL 25 MG
50 TABLET ORAL ONCE
Status: COMPLETED | OUTPATIENT
Start: 2017-10-18 | End: 2017-10-18

## 2017-10-18 RX ORDER — CLOBETASOL PROPIONATE 0.5 MG/G
CREAM TOPICAL
Qty: 1 TUBE | Refills: 1 | Status: SHIPPED | OUTPATIENT
Start: 2017-10-18 | End: 2018-01-29 | Stop reason: SDUPTHER

## 2017-10-18 RX ADMIN — DIPHENHYDRAMINE HCL 50 MG: 25 TABLET ORAL at 18:32

## 2017-10-18 RX ADMIN — CLONIDINE HYDROCHLORIDE 0.1 MG: 0.1 TABLET ORAL at 18:32

## 2017-10-18 RX ADMIN — PREDNISONE 20 MG: 5 TABLET ORAL at 18:32

## 2017-10-18 RX ADMIN — CLONIDINE HYDROCHLORIDE 0.1 MG: 0.1 TABLET ORAL at 17:45

## 2017-10-18 ASSESSMENT — ENCOUNTER SYMPTOMS
CONSTIPATION: 0
ABDOMINAL PAIN: 0
COUGH: 0
SINUS PRESSURE: 0
RHINORRHEA: 0
VOMITING: 0
SHORTNESS OF BREATH: 0
TROUBLE SWALLOWING: 0
DIARRHEA: 0
NAUSEA: 0
SORE THROAT: 0

## 2017-10-18 ASSESSMENT — PAIN DESCRIPTION - PAIN TYPE: TYPE: ACUTE PAIN

## 2017-10-18 ASSESSMENT — PAIN SCALES - GENERAL: PAINLEVEL_OUTOF10: 8

## 2017-10-18 ASSESSMENT — PAIN DESCRIPTION - ORIENTATION: ORIENTATION: RIGHT

## 2017-10-18 ASSESSMENT — PAIN DESCRIPTION - LOCATION: LOCATION: ARM;LEG

## 2017-10-18 NOTE — ED PROVIDER NOTES
nervous/anxious. A complete review of systems was performed and is negative except as noted above in the HPI. PAST MEDICAL HISTORY     Past Medical History:   Diagnosis Date    Anemia     Lyons's esophagus     Bowel obstruction     Depression     Diabetes mellitus type 2 in nonobese (HCC)     DVT (deep venous thrombosis) (HCC)     Fibromyalgia     Gastric ulcer     Hypertension     Hypothyroidism     Iron deficiency     Lupus     Stomach ulcer          SURGICAL HISTORY       Past Surgical History:   Procedure Laterality Date    APPENDECTOMY      CARD[facility]AC [facility]  [facility]    EF over 60%    CHOLECYSTECTOMY      COLONOSCOPY  2/2010    negative    COLONOSCOPY  2/22/10    Dr Rachid Wilson    COLONOSCOPY  4/1/16    Dr LAKHWINDER Horvath-internal hemorrhoids, 5 yr recall    EYE SURGERY      Cyst on Right eye    GASTRIC BYPASS SURGERY      HERNIA REPAIR      HYSTERECTOMY      Complete    HYSTERECTOMY      Partial - because had a tubal pregnancy.  INCONTINENCE SURGERY      Bladder Sling    PACEMAKER INSERTION      SPLENECTOMY      KRISTEL AND BSO      TONSILLECTOMY AND ADENOIDECTOMY      UPPER GASTROINTESTINAL ENDOSCOPY  12/2011    gerd s/p gastric bypass    UPPER GASTROINTESTINAL ENDOSCOPY  2/2014    normal s/p gastric bypass    UPPER GASTROINTESTINAL ENDOSCOPY  2/2010    biopsy neg Barretts, chronic reflux esophagitis s/p gastric bypass    UPPER GASTROINTESTINAL ENDOSCOPY  7/2006    unremarkable s/p gastric bypass    UPPER GASTROINTESTINAL ENDOSCOPY  8/10/15    Dr Josey Amezquita ENDOSCOPY  4/1/16    Dr Tita Jonas    UPPER GASTROINTESTINAL ENDOSCOPY N/A 4/1/2016    EGD ESOPHAGOGASTRODUODENOSCOPY performed by Mino Haro MD at [address] [place] [address] Endoscopy         CURRENT MEDICATIONS       Previous Medications    AMLODIPINE (NORVASC) 5 MG TABLET        ASPIRIN 81 MG EC TABLET    Take 81 mg by mouth daily.       BUMETANIDE (BUMEX) 0.5 MG TABLET    Take 1 tablet by mouth 2 times daily    CLOBETASOL (TEMOVATE) 0.05 % CREAM    Apply topically 2 times daily. CLONIDINE (CATAPRES) 0.1 MG TABLET    Take 1 tablet by mouth 2 times daily    ERGOCALCIFEROL (VITAMIN D2 PO)    Take 50,000 Units by mouth    ESCITALOPRAM (LEXAPRO) 10 MG TABLET        FLUTICASONE-SALMETEROL (ADVAIR DISKUS) 250-50 MCG/DOSE AEPB    Inhale 1 puff into the lungs every 12 hours. GABAPENTIN, ONCE-DAILY, 300 MG TABS    Take 300 mg by mouth 2 times daily as needed (leg pain)    LEVOTHYROXINE (SYNTHROID) 100 MCG TABLET    Take by mouth    LOSARTAN-HYDROCHLOROTHIAZIDE (HYZAAR) 100-25 MG PER TABLET    Take 1 tablet by mouth    MULTIPLE VITAMIN (MULTIVITAMIN PO)    Take 1 tablet by mouth daily     ONDANSETRON (ZOFRAN) 4 MG TABLET    Take 1 tablet by mouth every 8 hours as needed for Nausea or Vomiting    PANTOPRAZOLE (PROTONIX) 40 MG TABLET    Take 40 mg by mouth 2 times daily    POTASSIUM CHLORIDE (KLOR-CON M) 10 MEQ EXTENDED RELEASE TABLET    Take 1 tablet by mouth daily    RANITIDINE (ZANTAC) 150 MG TABLET        ROPINIROLE (REQUIP) 0.5 MG TABLET    Take 1 tablet by mouth nightly as needed (leg cramps)    SUCRALFATE (CARAFATE) 1 GM/10ML SUSPENSION    Take 1 g by mouth 4 times daily    WARFARIN (COUMADIN) 5 MG TABLET    Take 2 tablets by mouth daily Take two tablets daily       ALLERGIES     Insect extract allergy skin test; Lortab [hydrocodone-acetaminophen];  Other; Oxycodone-acetaminophen; and Ultram [tramadol hcl]    FAMILY HISTORY       Family History   Problem Relation Age of Onset    Uterine Cancer Mother     Cervical Cancer Mother     Heart Disease Father     Lung Cancer Father     Colon Cancer Brother     Colon Polyps Brother     Uterine Cancer Maternal Grandmother     Cervical Cancer Maternal Grandmother     Diabetes Maternal Grandmother     Cervical Cancer Sister     Diabetes Paternal Aunt     Esophageal Cancer Neg Hx     Liver Cancer Neg Hx     Liver Disease Neg Hx     Stomach Cancer Neg Hx  Rectal Cancer Neg Hx           SOCIAL HISTORY       Social History     Social History    Marital status:      Spouse name: N/A    Number of children: N/A    Years of education: N/A     Social History Main Topics    Smoking status: Never Smoker    Smokeless tobacco: Never Used    Alcohol use No    Drug use: No    Sexual activity: Not Currently     Other Topics Concern    None     Social History Narrative    None       SCREENINGS             PHYSICAL EXAM    (up to 7 for level 4, 8 or more for level 5)     ED Triage Vitals [10/18/17 1459]   BP Temp Temp Source Pulse Resp SpO2 Height Weight   (!) 202/109 98 °F (36.7 °C) Oral 68 18 98 % 5' 7\" (1.702 m) 240 lb (108.9 kg)       Physical Exam   Constitutional: She is oriented to person, place, and time. She appears well-developed and well-nourished. HENT:   Head: Normocephalic and atraumatic. Neck: Normal range of motion. Neck supple. Cardiovascular: Normal rate, regular rhythm, normal heart sounds and intact distal pulses. Pulmonary/Chest: Effort normal and breath sounds normal.   Musculoskeletal: Normal range of motion. Neurological: She is alert and oriented to person, place, and time. Skin: Skin is warm. Scattered mild erythematous lesions noted bilateral arms and posterior neck. (?insect bites)   Psychiatric: She has a normal mood and affect.  Her behavior is normal. Judgment and thought content normal.       DIAGNOSTIC RESULTS     EKG: All EKG's are interpreted by the Emergency Department Physician who either signs or Co-signs this chart in the absence of a cardiologist.        RADIOLOGY:   Non-plain film images such as CT, Ultrasound and MRI are read by the radiologist. Plain radiographic images are visualized and preliminarily interpreted by the emergency physician with the below findings:        Interpretation per the Radiologist below, if available at the time of this note:    No orders to display         ED BEDSIDE

## 2017-10-19 ENCOUNTER — TELEPHONE (OUTPATIENT)
Dept: INTERNAL MEDICINE | Age: 67
End: 2017-10-19

## 2017-10-19 NOTE — TELEPHONE ENCOUNTER
Pt called and left message on our voicemail stating that she has a rash all over and its itchy and burning.   She went to ER the other day and they gave her benadryl and a medrol dos riri and they told her to contact her

## 2017-10-23 ENCOUNTER — OFFICE VISIT (OUTPATIENT)
Dept: INTERNAL MEDICINE | Age: 67
End: 2017-10-23
Payer: MEDICARE

## 2017-10-23 VITALS
TEMPERATURE: 97.8 F | BODY MASS INDEX: 38.3 KG/M2 | DIASTOLIC BLOOD PRESSURE: 86 MMHG | WEIGHT: 244 LBS | SYSTOLIC BLOOD PRESSURE: 136 MMHG | RESPIRATION RATE: 18 BRPM | HEIGHT: 67 IN | HEART RATE: 64 BPM | OXYGEN SATURATION: 97 %

## 2017-10-23 DIAGNOSIS — F41.9 ANXIETY: ICD-10-CM

## 2017-10-23 DIAGNOSIS — R21 RASH: Primary | ICD-10-CM

## 2017-10-23 PROCEDURE — G8417 CALC BMI ABV UP PARAM F/U: HCPCS | Performed by: NURSE PRACTITIONER

## 2017-10-23 PROCEDURE — G8598 ASA/ANTIPLAT THER USED: HCPCS | Performed by: NURSE PRACTITIONER

## 2017-10-23 PROCEDURE — G8484 FLU IMMUNIZE NO ADMIN: HCPCS | Performed by: NURSE PRACTITIONER

## 2017-10-23 PROCEDURE — G8427 DOCREV CUR MEDS BY ELIG CLIN: HCPCS | Performed by: NURSE PRACTITIONER

## 2017-10-23 PROCEDURE — 4040F PNEUMOC VAC/ADMIN/RCVD: CPT | Performed by: NURSE PRACTITIONER

## 2017-10-23 PROCEDURE — 3014F SCREEN MAMMO DOC REV: CPT | Performed by: NURSE PRACTITIONER

## 2017-10-23 PROCEDURE — 1123F ACP DISCUSS/DSCN MKR DOCD: CPT | Performed by: NURSE PRACTITIONER

## 2017-10-23 PROCEDURE — 99213 OFFICE O/P EST LOW 20 MIN: CPT | Performed by: NURSE PRACTITIONER

## 2017-10-23 PROCEDURE — G8400 PT W/DXA NO RESULTS DOC: HCPCS | Performed by: NURSE PRACTITIONER

## 2017-10-23 PROCEDURE — 3017F COLORECTAL CA SCREEN DOC REV: CPT | Performed by: NURSE PRACTITIONER

## 2017-10-23 PROCEDURE — 96372 THER/PROPH/DIAG INJ SC/IM: CPT | Performed by: NURSE PRACTITIONER

## 2017-10-23 PROCEDURE — 1036F TOBACCO NON-USER: CPT | Performed by: NURSE PRACTITIONER

## 2017-10-23 PROCEDURE — 1090F PRES/ABSN URINE INCON ASSESS: CPT | Performed by: NURSE PRACTITIONER

## 2017-10-23 RX ORDER — HYDROXYZINE HYDROCHLORIDE 25 MG/1
25 TABLET, FILM COATED ORAL 3 TIMES DAILY PRN
Qty: 30 TABLET | Refills: 0 | Status: SHIPPED | OUTPATIENT
Start: 2017-10-23 | End: 2017-11-02

## 2017-10-23 RX ORDER — METHYLPREDNISOLONE ACETATE 80 MG/ML
80 INJECTION, SUSPENSION INTRA-ARTICULAR; INTRALESIONAL; INTRAMUSCULAR; SOFT TISSUE ONCE
Status: COMPLETED | OUTPATIENT
Start: 2017-10-23 | End: 2017-10-23

## 2017-10-23 RX ADMIN — METHYLPREDNISOLONE ACETATE 80 MG: 80 INJECTION, SUSPENSION INTRA-ARTICULAR; INTRALESIONAL; INTRAMUSCULAR; SOFT TISSUE at 15:18

## 2017-10-23 ASSESSMENT — ENCOUNTER SYMPTOMS
CONSTIPATION: 0
CHOKING: 0
EYE DISCHARGE: 0
STRIDOR: 0
TROUBLE SWALLOWING: 0
SORE THROAT: 0
WHEEZING: 0
EYE ITCHING: 0
NAUSEA: 0
VOMITING: 0
SHORTNESS OF BREATH: 0
COUGH: 0
ABDOMINAL PAIN: 0
BLOOD IN STOOL: 0
ABDOMINAL DISTENTION: 0
DIARRHEA: 0
COLOR CHANGE: 0

## 2017-10-23 NOTE — PROGRESS NOTES
[facility]  [address] [address]  Dept: [phone]  Dept Fax: [phone]  Loc: [phone]    Gustavo Romero is a 75 y.o. female who presents today for her medical conditions/complaints as noted below. Gustavo Romero is c/o of Rash (Patient states rash all over body.)        HPI:     HPI   Rash;  She has had for several weeks; She has has triamcinolone and medrol riri; This has not helped at all ; She has old lesions on arms, etc that have not completely healed and she has new ones all over, neck, chest back and extremities; No one else in her house has them; she has sprayed for bed bugs; Chief Complaint   Patient presents with    Rash     Patient states rash all over body. Past Medical History:   Diagnosis Date    Anemia     Lyons's esophagus     Bowel obstruction     Depression     Diabetes mellitus type 2 in nonobese (HCC)     DVT (deep venous thrombosis) (HCC)     Fibromyalgia     Gastric ulcer     Hypertension     Hypothyroidism     Iron deficiency     Lupus     Stomach ulcer       Past Surgical History:   Procedure Laterality Date    APPENDECTOMY      CARDIAC CATHETERIZATION  10/21/13  [address]    EF over 60%    CHOLECYSTECTOMY      COLONOSCOPY  2/2010    negative    COLONOSCOPY  2/22/10    Dr Nazia Sky    COLONOSCOPY  4/1/16    Dr LAKHWINDER Horvath-internal hemorrhoids, 5 yr recall    EYE SURGERY      Cyst on Right eye    GASTRIC BYPASS SURGERY      HERNIA REPAIR      HYSTERECTOMY      Complete    HYSTERECTOMY      Partial - because had a tubal pregnancy.      INCONTINENCE SURGERY      Bladder Sling    PACEMAKER INSERTION      SPLENECTOMY      KRISTEL AND BSO      TONSILLECTOMY AND ADENOIDECTOMY      UPPER GASTROINTESTINAL ENDOSCOPY  12/2011    gerd s/p gastric bypass    UPPER GASTROINTESTINAL ENDOSCOPY  2/2014    normal s/p gastric bypass    UPPER GASTROINTESTINAL ENDOSCOPY  2/2010    biopsy neg Barretts, chronic reflux esophagitis s/p gastric bypass    UPPER GASTROINTESTINAL ENDOSCOPY  7/2006    unremarkable s/p gastric bypass    UPPER GASTROINTESTINAL ENDOSCOPY  8/10/15    Dr Lu Deluca ENDOSCOPY  4/1/16    Dr LAKHWINDER Chao N/A 4/1/2016    EGD ESOPHAGOGASTRODUODENOSCOPY performed by Hood Beckford MD at [address] [facility]       Family History   Problem Relation Age of Onset    Uterine Cancer Mother     Cervical Cancer Mother     Heart Disease Father     Lung Cancer Father     Colon Cancer Brother     Colon Polyps Brother     Uterine Cancer Maternal Grandmother     Cervical Cancer Maternal Grandmother     Diabetes Maternal Grandmother     Cervical Cancer Sister     Diabetes Paternal Aunt     Esophageal Cancer Neg Hx     Liver Cancer Neg Hx     Liver Disease Neg Hx     Stomach Cancer Neg Hx     Rectal Cancer Neg Hx        Social History   Substance Use Topics    Smoking status: Never Smoker    Smokeless tobacco: Never Used    Alcohol use No      Current Outpatient Prescriptions   Medication Sig Dispense Refill    hydrOXYzine (ATARAX) 25 MG tablet Take 1 tablet by mouth 3 times daily as needed for Itching 30 tablet 0    clobetasol (TEMOVATE) 0.05 % cream Apply topically 2 times daily. 1 Tube 1    diphenhydrAMINE (BENADRYL) 25 MG capsule Take 1 capsule by mouth every 6 hours as needed for Allergies 30 capsule 0    methylPREDNISolone (MEDROL, CASPER,) 4 MG tablet Take by mouth.  1 kit 0    bumetanide (BUMEX) 0.5 MG tablet Take 1 tablet by mouth 2 times daily 60 tablet 1    rOPINIRole (REQUIP) 0.5 MG tablet Take 1 tablet by mouth nightly as needed (leg cramps) 30 tablet 3    Gabapentin, Once-Daily, 300 MG TABS Take 300 mg by mouth 2 times daily as needed (leg pain) 30 tablet 1    ondansetron (ZOFRAN) 4 MG tablet Take 1 tablet by mouth every 8 hours as needed for Nausea or Vomiting 30 tablet 1    Ergocalciferol (VITAMIN D2 PO) Take Negative for fatigue, fever and unexpected weight change. HENT: Negative for ear discharge, ear pain, mouth sores, sore throat and trouble swallowing. Eyes: Negative for discharge, itching and visual disturbance. Respiratory: Negative for cough, choking, shortness of breath, wheezing and stridor. Cardiovascular: Negative for chest pain, palpitations and leg swelling. Gastrointestinal: Negative for abdominal distention, abdominal pain, blood in stool, constipation, diarrhea, nausea and vomiting. Endocrine: Negative for cold intolerance, polydipsia and polyuria. Genitourinary: Negative for difficulty urinating, dysuria, frequency and urgency. Musculoskeletal: Negative for arthralgias and gait problem. Skin: Positive for rash. Negative for color change. Allergic/Immunologic: Negative for food allergies and immunocompromised state. Neurological: Negative for dizziness, tremors, syncope, speech difficulty, weakness and headaches. Hematological: Negative for adenopathy. Does not bruise/bleed easily. Psychiatric/Behavioral: Negative for confusion and hallucinations. Objective:     Physical Exam   Constitutional: She is oriented to person, place, and time. She appears well-developed and well-nourished. No distress. HENT:   Head: Normocephalic and atraumatic. Eyes: Pupils are equal, round, and reactive to light. Right eye exhibits no discharge. Left eye exhibits no discharge. No scleral icterus. Neck: Normal range of motion. Neck supple. No JVD present. No thyromegaly present. Cardiovascular: Normal rate, regular rhythm and normal heart sounds. No murmur heard. Pulmonary/Chest: Effort normal and breath sounds normal. No respiratory distress. She has no wheezes. She has no rales. Abdominal: Soft. Bowel sounds are normal. She exhibits no distension and no mass. There is no tenderness. There is no rebound and no guarding. Musculoskeletal: Normal range of motion.  She exhibits no PM

## 2017-10-23 NOTE — PATIENT INSTRUCTIONS
Rash;  Persistent;   Medrol 80 IM   Mix steroid cream mixed with benadryl cream up to 3 times a day   You can use the benadryl cream to the lesions as much as you want;   Atarax 25 po every 8 hours as needed for itching/anxiety .  We did discuss the issue that this could be anxiety related as well

## 2017-10-26 ENCOUNTER — TELEPHONE (OUTPATIENT)
Dept: INTERNAL MEDICINE | Age: 67
End: 2017-10-26

## 2017-10-30 ENCOUNTER — TELEPHONE (OUTPATIENT)
Dept: INTERNAL MEDICINE | Age: 67
End: 2017-10-30

## 2017-10-30 NOTE — TELEPHONE ENCOUNTER
Called pt back, she left a nasty voicemail. She said she has been trying to call for 3 weeks. However, the referral has only been in since the 23rd of this month. Jethro said she would call her back within 24 hours to let her know when her appointment is. I advised her of this and asked her to call me by 2pm tomorrow if she still hasn't heard anything.  She thanked me for calling her back

## 2017-11-03 ENCOUNTER — RESULTS ENCOUNTER (OUTPATIENT)
Dept: BARIATRICS/WEIGHT MGMT | Facility: CLINIC | Age: 67
End: 2017-11-03

## 2017-11-03 DIAGNOSIS — E55.9 VITAMIN D DEFICIENCY: ICD-10-CM

## 2017-11-03 DIAGNOSIS — Z98.84 HISTORY OF ROUX-EN-Y GASTRIC BYPASS: ICD-10-CM

## 2017-11-03 DIAGNOSIS — E66.01 MORBID OBESITY DUE TO EXCESS CALORIES (HCC): ICD-10-CM

## 2017-11-03 DIAGNOSIS — I10 HYPERTENSION, UNCONTROLLED: ICD-10-CM

## 2017-11-20 ENCOUNTER — TELEPHONE (OUTPATIENT)
Dept: INTERNAL MEDICINE | Age: 67
End: 2017-11-20

## 2017-12-01 ENCOUNTER — RESULTS ENCOUNTER (OUTPATIENT)
Dept: BARIATRICS/WEIGHT MGMT | Facility: CLINIC | Age: 67
End: 2017-12-01

## 2017-12-01 DIAGNOSIS — E66.01 MORBID OBESITY DUE TO EXCESS CALORIES (HCC): ICD-10-CM

## 2017-12-01 DIAGNOSIS — I10 HYPERTENSION, UNCONTROLLED: ICD-10-CM

## 2017-12-01 DIAGNOSIS — Z98.84 HISTORY OF ROUX-EN-Y GASTRIC BYPASS: ICD-10-CM

## 2017-12-01 DIAGNOSIS — E55.9 VITAMIN D DEFICIENCY: ICD-10-CM

## 2017-12-04 RX ORDER — ONDANSETRON 4 MG/1
4 TABLET, FILM COATED ORAL EVERY 8 HOURS PRN
Qty: 30 TABLET | Refills: 1 | Status: SHIPPED | OUTPATIENT
Start: 2017-12-04 | End: 2017-12-07 | Stop reason: SDUPTHER

## 2017-12-04 RX ORDER — PANTOPRAZOLE SODIUM 40 MG/1
TABLET, DELAYED RELEASE ORAL
Qty: 60 TABLET | Refills: 2 | Status: SHIPPED | OUTPATIENT
Start: 2017-12-04 | End: 2017-12-07 | Stop reason: SDUPTHER

## 2017-12-04 RX ORDER — SUCRALFATE 1 G/10ML
SUSPENSION ORAL
Refills: 0 | OUTPATIENT
Start: 2017-12-04

## 2017-12-04 NOTE — TELEPHONE ENCOUNTER
Not seen since August.   I believe that Dr. Yates has requested she be seen at a tertiary care center as well.

## 2017-12-05 ENCOUNTER — TELEPHONE (OUTPATIENT)
Dept: INTERNAL MEDICINE | Age: 67
End: 2017-12-05

## 2017-12-05 RX ORDER — CEFUROXIME AXETIL 250 MG/1
250 TABLET ORAL 2 TIMES DAILY
Qty: 16 TABLET | Refills: 0 | Status: SHIPPED | OUTPATIENT
Start: 2017-12-05 | End: 2017-12-13

## 2017-12-05 NOTE — TELEPHONE ENCOUNTER
Voicemail:  Patient aches all over, stuffy nose, tightness in chest and throat. Can we call something into [facility] on [address] ?  12/5 1040  Dr. Apollo Gray is on call for Dr. Ev Carty

## 2017-12-07 RX ORDER — PANTOPRAZOLE SODIUM 40 MG/1
40 TABLET, DELAYED RELEASE ORAL DAILY
Qty: 60 TABLET | Refills: 2 | Status: SHIPPED | OUTPATIENT
Start: 2017-12-07 | End: 2018-01-29 | Stop reason: SDUPTHER

## 2017-12-07 RX ORDER — ONDANSETRON 4 MG/1
4 TABLET, FILM COATED ORAL EVERY 8 HOURS PRN
Qty: 30 TABLET | Refills: 1 | Status: SHIPPED | OUTPATIENT
Start: 2017-12-07 | End: 2018-01-29 | Stop reason: SDUPTHER

## 2017-12-19 ENCOUNTER — TELEPHONE (OUTPATIENT)
Dept: INTERNAL MEDICINE | Age: 67
End: 2017-12-19

## 2017-12-19 NOTE — TELEPHONE ENCOUNTER
Spoke with patient:  She states Corin Bethea has sent us papers to fill out so the patient can get her diabetic shoes. She has not been able to get these for two years as she states our office never fills out the required paperwork.   12/19 11:32

## 2018-01-15 ENCOUNTER — TELEPHONE (OUTPATIENT)
Dept: INTERNAL MEDICINE | Age: 68
End: 2018-01-15

## 2018-01-15 NOTE — TELEPHONE ENCOUNTER
Called patient to see if she is still having symptoms, she did not answer.  Left vm requesting a callback

## 2018-01-24 ENCOUNTER — TELEPHONE (OUTPATIENT)
Dept: CARDIOLOGY | Facility: CLINIC | Age: 68
End: 2018-01-24

## 2018-01-29 ENCOUNTER — OFFICE VISIT (OUTPATIENT)
Dept: NEUROLOGY | Facility: CLINIC | Age: 68
End: 2018-01-29

## 2018-01-29 VITALS
BODY MASS INDEX: 36.88 KG/M2 | DIASTOLIC BLOOD PRESSURE: 100 MMHG | WEIGHT: 235 LBS | SYSTOLIC BLOOD PRESSURE: 170 MMHG | HEIGHT: 67 IN | RESPIRATION RATE: 18 BRPM | HEART RATE: 74 BPM

## 2018-01-29 DIAGNOSIS — M54.50 ACUTE BILATERAL LOW BACK PAIN WITHOUT SCIATICA: ICD-10-CM

## 2018-01-29 DIAGNOSIS — G47.33 OSA (OBSTRUCTIVE SLEEP APNEA): Primary | ICD-10-CM

## 2018-01-29 PROCEDURE — 99215 OFFICE O/P EST HI 40 MIN: CPT | Performed by: PSYCHIATRY & NEUROLOGY

## 2018-01-29 RX ORDER — PANTOPRAZOLE SODIUM 40 MG/1
40 TABLET, DELAYED RELEASE ORAL DAILY
Qty: 60 TABLET | Refills: 2 | Status: SHIPPED | OUTPATIENT
Start: 2018-01-29 | End: 2018-04-25 | Stop reason: SDUPTHER

## 2018-01-29 RX ORDER — ROPINIROLE 0.5 MG/1
0.5 TABLET, FILM COATED ORAL NIGHTLY PRN
Qty: 30 TABLET | Refills: 3 | Status: SHIPPED | OUTPATIENT
Start: 2018-01-29 | End: 2018-03-06 | Stop reason: ALTCHOICE

## 2018-01-29 RX ORDER — ONDANSETRON 4 MG/1
4 TABLET, FILM COATED ORAL EVERY 8 HOURS PRN
Qty: 30 TABLET | Refills: 1 | Status: SHIPPED | OUTPATIENT
Start: 2018-01-29 | End: 2018-08-07 | Stop reason: SDUPTHER

## 2018-01-29 RX ORDER — CLOBETASOL PROPIONATE 0.5 MG/G
CREAM TOPICAL
Qty: 1 TUBE | Refills: 1 | Status: SHIPPED | OUTPATIENT
Start: 2018-01-29 | End: 2018-08-07 | Stop reason: SDUPTHER

## 2018-01-29 RX ORDER — WARFARIN SODIUM 5 MG/1
10 TABLET ORAL DAILY
Qty: 60 TABLET | Refills: 3 | Status: SHIPPED | OUTPATIENT
Start: 2018-01-29 | End: 2018-04-25 | Stop reason: DRUGHIGH

## 2018-01-29 RX ORDER — ONDANSETRON 4 MG/1
4 TABLET, FILM COATED ORAL EVERY 8 HOURS PRN
COMMUNITY
End: 2019-05-06

## 2018-01-29 RX ORDER — BUMETANIDE 0.5 MG/1
0.5 TABLET ORAL 2 TIMES DAILY
Qty: 60 TABLET | Refills: 1 | Status: SHIPPED | OUTPATIENT
Start: 2018-01-29 | End: 2018-02-28 | Stop reason: SINTOL

## 2018-01-29 RX ORDER — CLOBETASOL PROPIONATE 0.5 MG/G
CREAM TOPICAL 2 TIMES DAILY
COMMUNITY

## 2018-01-29 RX ORDER — ROPINIROLE 0.5 MG/1
0.5 TABLET, FILM COATED ORAL NIGHTLY PRN
COMMUNITY
End: 2019-02-12

## 2018-01-29 RX ORDER — BUMETANIDE 1 MG/1
2 TABLET ORAL DAILY
COMMUNITY
End: 2022-08-29

## 2018-01-29 NOTE — TELEPHONE ENCOUNTER
From: Taylor Romo  Sent: 1/29/2018 8:26 AM CST  Subject: Medication Renewal Request    Leny Wilkins would like a refill of the following medications:  warfarin (COUMADIN) 5 MG tablet [Regina Curtis MD]  Gabapentin, Once-Daily, 300 MG TABS [Regina Curtis MD]  bumetanide (BUMEX) 0.5 MG tablet Radhames Peña MD]  rOPINIRole (REQUIP) 0.5 MG tablet Juan Curtis MD]  clobetasol (TEMOVATE) 0.05 % cream Radhames Peña MD]  ondansetron (ZOFRAN) 4 MG tablet Juan Horan MD]  pantoprazole (PROTONIX) 40 MG tablet Radhames Peña MD]    Preferred pharmacy: [address], Kurt Wood [address] [place] [address],[address] - [phone]    Comment:  need all my medicines written for 90 day supply    Medication renewals requested in this message routed separately:  cloNIDine (CATAPRES) 0.1 MG tablet [ESTRADA Martinez MD]  potassium chloride (KLOR-CON M) 10 MEQ extended release tablet [ESTRADA Martinez MD]

## 2018-01-29 NOTE — PROGRESS NOTES
Subjective   Veronica Stewart, 1950, is a female who is being seen today for   Chief Complaint   Patient presents with   • Sleeping Problem     follow up       HISTORY OF PRESENT ILLNESS: Old.  Patient has not been seen since 2015.  Patient is on CPAP and says she wears it every other night but she has had problems with her mask with her dog chewing it up.  She is to get with her [facility] medical to get a download an overnight continuous oximetry.  Patient other major problem is that she fell backwards hitting the her back and buttock area last week with a lot of pain in the back radiating to the buttocks and then right knee pain from the knee down to the foot.  Patient is supposed to have knee replacement in March and apparently said her knee gave out on her.  REVIEW OF SYSTEMS:   GENERAL: As above  PULMONARY: As above  CVS:  No acute cardiac problems but has pacemaker  GASTROINTESTINAL: No acute GI distress  GENITOURINARY: No acute  distress  GYN: No acute GYN distress  MUSCULOSKELETAL: As above  HEENT:  Patient has no vision and right eye.    ENDOCRINE:  No acute endocrine symptoms  PSYCHIATRIC: No acute psychiatric symptoms  HEMATOLOGY: No recent blood work for review  SKIN: No acute skin changes  Family history: Reviewed and otherwise noncontributory  Social history: Patient denies smoking or drug or alcohol use  PHYSICAL EXAMINATION:    GENERAL: Patient and fairly significant back pain.  Patient's blood pressure is 170/100 left arm standing 164/104 for left arm sitting with pulse 74.  CRANIUM: Normocephalic/atraumatic  HEENT: No acute fundic abnormalities on the left.  [place] full on the left.  Pupil reactive on the left.  EOM intact on the left.       EYES: As above       EARS:  Tympanic membranes normal hears tuning fork bilaterally       THROAT: No oropharynx abnormalities       NECK:  No bruits/no lymphadenopathy  CHEST: No acute cardiopulmonary abnormalities by auscultation  ABDOMEN:  Nondistended  EXTREMITIES: Right knee feels somewhat warm and swollen  NEURO: Patient alert and follows commands without difficulty  SPEECH:  Normal    CRANIAL NERVES:  Motor sensory about the face normal and symmetric  EXTREMITIES:  Pulses symmetrical  MOTOR STRENGTH:  Motor strength upper and lower extremities normal  STATION AND GAIT:  Gait favors the right lower extremity with Romberg negative  CEREBELLAR:  Finger-nose symmetric.  It is difficult for patient to heel shin with pain  SENSORY:  Patient has decreased pin and vibration sensation distal to proximal to the ankles bilaterally but otherwise normal pin and vibration throughout  REFLEXES:  Reflexes decreased absent throughout upper and lower extremity without Babinski  OTHER:  It was difficult for patient to get him prone position to do the straight leg raising but patient did have some positive  FABERE and straight leg raising on the right in sitting position    ASSESSMENT AND PLAN:  Patient with history of FRANCIE and we are to get a download an overnight continuous oximetry.  Patient to see PCP about back problems and knee as well as blood pressure.  I spent 45 minutes with this patient with 25 minutes counseling.      There are no diagnoses linked to this encounter.

## 2018-01-31 ENCOUNTER — OFFICE VISIT (OUTPATIENT)
Dept: INTERNAL MEDICINE | Age: 68
End: 2018-01-31
Payer: MEDICARE

## 2018-01-31 ENCOUNTER — HOSPITAL ENCOUNTER (OUTPATIENT)
Dept: VASCULAR LAB | Age: 68
Discharge: HOME | End: 2018-01-31
Payer: MEDICARE

## 2018-01-31 VITALS
HEART RATE: 62 BPM | HEIGHT: 67 IN | OXYGEN SATURATION: 97 % | WEIGHT: 241.4 LBS | TEMPERATURE: 96.9 F | SYSTOLIC BLOOD PRESSURE: 160 MMHG | DIASTOLIC BLOOD PRESSURE: 120 MMHG | BODY MASS INDEX: 37.89 KG/M2

## 2018-01-31 DIAGNOSIS — Z23 NEED FOR PROPHYLACTIC VACCINATION AGAINST STREPTOCOCCUS PNEUMONIAE (PNEUMOCOCCUS): ICD-10-CM

## 2018-01-31 DIAGNOSIS — M54.42 ACUTE BILATERAL LOW BACK PAIN WITH BILATERAL SCIATICA: Primary | ICD-10-CM

## 2018-01-31 DIAGNOSIS — Z98.84 S/P GASTRIC BYPASS: ICD-10-CM

## 2018-01-31 DIAGNOSIS — Z79.01 ANTICOAGULATED: ICD-10-CM

## 2018-01-31 DIAGNOSIS — Z11.59 ENCOUNTER FOR HEPATITIS C SCREENING TEST FOR LOW RISK PATIENT: ICD-10-CM

## 2018-01-31 DIAGNOSIS — E55.9 VITAMIN D DEFICIENCY: ICD-10-CM

## 2018-01-31 DIAGNOSIS — M54.41 ACUTE BILATERAL LOW BACK PAIN WITH BILATERAL SCIATICA: Primary | ICD-10-CM

## 2018-01-31 DIAGNOSIS — I10 ESSENTIAL HYPERTENSION: ICD-10-CM

## 2018-01-31 DIAGNOSIS — E11.40 TYPE 2 DIABETES MELLITUS WITH DIABETIC NEUROPATHY, WITHOUT LONG-TERM CURRENT USE OF INSULIN (HCC): ICD-10-CM

## 2018-01-31 LAB
ALBUMIN SERPL-MCNC: 3.9 G/DL (ref 3.5–5.2)
ALP BLD-CCNC: 77 U/L (ref 35–104)
ALT SERPL-CCNC: 8 U/L (ref 5–33)
ANION GAP SERPL CALCULATED.3IONS-SCNC: 14 MMOL/L (ref 7–19)
AST SERPL-CCNC: 20 U/L (ref 5–32)
BILIRUB SERPL-MCNC: 0.6 MG/DL (ref 0.2–1.2)
BUN BLDV-MCNC: 15 MG/DL (ref 8–23)
CALCIUM SERPL-MCNC: 8.9 MG/DL (ref 8.8–10.2)
CHLORIDE BLD-SCNC: 104 MMOL/L (ref 98–111)
CHOLESTEROL, TOTAL: 169 MG/DL (ref 160–199)
CO2: 23 MMOL/L (ref 22–29)
CREAT SERPL-MCNC: 0.9 MG/DL (ref 0.5–0.9)
CREATININE URINE: 97.9 MG/DL (ref 4.2–622)
GFR NON-AFRICAN AMERICAN: >60
GLUCOSE BLD-MCNC: 113 MG/DL (ref 74–109)
HBA1C MFR BLD: 5.6 %
HDLC SERPL-MCNC: 78 MG/DL (ref 65–121)
HEPATITIS C ANTIBODY INTERPRETATION: NORMAL
INR BLD: 0.99 (ref 0.88–1.18)
LDL CHOLESTEROL CALCULATED: 79 MG/DL
MICROALBUMIN UR-MCNC: <1.2 MG/DL (ref 0–19)
MICROALBUMIN/CREAT UR-RTO: NORMAL MG/G
POTASSIUM SERPL-SCNC: 3.7 MMOL/L (ref 3.5–5)
PROTHROMBIN TIME: 13 SEC (ref 12–14.6)
SODIUM BLD-SCNC: 141 MMOL/L (ref 136–145)
TOTAL PROTEIN: 7.5 G/DL (ref 6.6–8.7)
TRIGL SERPL-MCNC: 61 MG/DL (ref 0–149)
TSH SERPL DL<=0.05 MIU/L-ACNC: 3.15 UIU/ML (ref 0.27–4.2)
VITAMIN B-12: 348 PG/ML (ref 211–946)

## 2018-01-31 PROCEDURE — G8427 DOCREV CUR MEDS BY ELIG CLIN: HCPCS | Performed by: PHYSICIAN ASSISTANT

## 2018-01-31 PROCEDURE — 1090F PRES/ABSN URINE INCON ASSESS: CPT | Performed by: PHYSICIAN ASSISTANT

## 2018-01-31 PROCEDURE — 4040F PNEUMOC VAC/ADMIN/RCVD: CPT | Performed by: PHYSICIAN ASSISTANT

## 2018-01-31 PROCEDURE — G8417 CALC BMI ABV UP PARAM F/U: HCPCS | Performed by: PHYSICIAN ASSISTANT

## 2018-01-31 PROCEDURE — 93970 EXTREMITY STUDY: CPT

## 2018-01-31 PROCEDURE — G0009 ADMIN PNEUMOCOCCAL VACCINE: HCPCS | Performed by: PHYSICIAN ASSISTANT

## 2018-01-31 PROCEDURE — 1123F ACP DISCUSS/DSCN MKR DOCD: CPT | Performed by: PHYSICIAN ASSISTANT

## 2018-01-31 PROCEDURE — G8484 FLU IMMUNIZE NO ADMIN: HCPCS | Performed by: PHYSICIAN ASSISTANT

## 2018-01-31 PROCEDURE — 1036F TOBACCO NON-USER: CPT | Performed by: PHYSICIAN ASSISTANT

## 2018-01-31 PROCEDURE — G8598 ASA/ANTIPLAT THER USED: HCPCS | Performed by: PHYSICIAN ASSISTANT

## 2018-01-31 PROCEDURE — 90670 PCV13 VACCINE IM: CPT | Performed by: PHYSICIAN ASSISTANT

## 2018-01-31 PROCEDURE — G8400 PT W/DXA NO RESULTS DOC: HCPCS | Performed by: PHYSICIAN ASSISTANT

## 2018-01-31 PROCEDURE — 3014F SCREEN MAMMO DOC REV: CPT | Performed by: PHYSICIAN ASSISTANT

## 2018-01-31 PROCEDURE — 99213 OFFICE O/P EST LOW 20 MIN: CPT | Performed by: PHYSICIAN ASSISTANT

## 2018-01-31 PROCEDURE — 3017F COLORECTAL CA SCREEN DOC REV: CPT | Performed by: PHYSICIAN ASSISTANT

## 2018-01-31 RX ORDER — TIZANIDINE 4 MG/1
4 TABLET ORAL EVERY 6 HOURS PRN
Qty: 30 TABLET | Refills: 1 | Status: SHIPPED | OUTPATIENT
Start: 2018-01-31 | End: 2018-02-28 | Stop reason: SINTOL

## 2018-01-31 RX ORDER — ONDANSETRON 2 MG/ML
INJECTION INTRAMUSCULAR; INTRAVENOUS
Status: DISCONTINUED
Start: 2018-01-31 | End: 2018-02-01 | Stop reason: HOSPADM

## 2018-01-31 RX ORDER — AMLODIPINE BESYLATE 10 MG/1
10 TABLET ORAL DAILY
Qty: 30 TABLET | Refills: 3 | Status: SHIPPED | OUTPATIENT
Start: 2018-01-31 | End: 2018-02-23 | Stop reason: SDUPTHER

## 2018-02-01 ENCOUNTER — OFFICE VISIT (OUTPATIENT)
Dept: BARIATRICS/WEIGHT MGMT | Facility: CLINIC | Age: 68
End: 2018-02-01

## 2018-02-01 VITALS
DIASTOLIC BLOOD PRESSURE: 101 MMHG | OXYGEN SATURATION: 100 % | WEIGHT: 237.6 LBS | SYSTOLIC BLOOD PRESSURE: 189 MMHG | HEART RATE: 62 BPM | HEIGHT: 67 IN | TEMPERATURE: 98.6 F | BODY MASS INDEX: 37.29 KG/M2

## 2018-02-01 DIAGNOSIS — E66.01 MORBID OBESITY DUE TO EXCESS CALORIES (HCC): ICD-10-CM

## 2018-02-01 DIAGNOSIS — Z98.84 HISTORY OF ROUX-EN-Y GASTRIC BYPASS: ICD-10-CM

## 2018-02-01 DIAGNOSIS — E66.9 OBESITY, CLASS II, BMI 35-39.9: Primary | ICD-10-CM

## 2018-02-01 PROBLEM — E66.812 OBESITY, CLASS II, BMI 35-39.9: Status: ACTIVE | Noted: 2018-02-01

## 2018-02-01 PROCEDURE — 99212 OFFICE O/P EST SF 10 MIN: CPT | Performed by: SURGERY

## 2018-02-01 RX ORDER — AMLODIPINE BESYLATE 5 MG/1
5 TABLET ORAL DAILY
COMMUNITY
End: 2019-04-29 | Stop reason: ALTCHOICE

## 2018-02-01 NOTE — PROGRESS NOTES
"Subjective   Veronica Stewart is a 67 y.o. female.     Veronica is post op several years from her gastric bypass procedure.  She is currently on her regular diet.  He states she no longer suffers from nausea symptoms.  With regards to her behavior she is only consuming 2 meals a day with portion sizes of over a cup.  She does consume at least 64 ounces of fluid regularly.  She is not exercising.  She does complain of weight gain.  Her meals are usually soups.      Review Of Systems:  General ROS: positive for  - fatigue and weight gain  Respiratory ROS: no cough, shortness of breath, or wheezing  Cardiovascular ROS: no chest pain or dyspnea on exertion  Gastrointestinal ROS: no abdominal pain, change in bowel habits, or black or bloody stools  positive for - constipation  Musculoskeletal ROS: positive for - joint pain    The following portions of the patient's history were reviewed and updated as appropriate: allergies, current medications, past medical history, past surgical history and problem list.    Objective   BP (!) 189/101 (BP Location: Right arm, Patient Position: Sitting, Cuff Size: Adult)  Pulse 62  Temp 98.6 °F (37 °C)  Ht 170.2 cm (67\")  Wt 108 kg (237 lb 9.6 oz)  SpO2 100%  BMI 37.21 kg/m2    General Appearance:  awake, alert, oriented, in no acute distress  Lungs:  Normal expansion.  Clear to auscultation.  No rales, rhonchi, or wheezing.  Heart:  Heart sounds are normal.  Regular rate and rhythm without murmur, gallop or rub.  Abdomen:  Soft, non-tender, normal bowel sounds; no bruits, organomegaly or masses.  Abnormal shape: obese  Extremities: Extremities warm to touch, pink, with no edema.  Wounds: clean, dry, intact    Assessment/Plan     Encounter Diagnoses   Name Primary?   • Obesity, Class II, BMI 35-39.9 Yes   • History of Yamilex-en-Y gastric bypass    • Morbid obesity due to excess calories        1.  The patient and I discussed the importance of changing behavior for consistent and successful " weight loss.  We first reviewed again the definition of a meal which is defined as portion sizes less than a half a cup and those portions incorporating a protein.  Specifically the protein should fill half of that volume.  I also explained that the patient should be attempting to consume 4-5 meals as defined above.  This should also be mindful of adequate hydration and incorporation of a regular exercise regimen.  I recommended that they begin their multivitamins as directed.  I have also identified that eating soup can increase true volume/portion sizes.  She has to be more cognizant of her food choices.  I recommended that she avoid soups and cereals.  She is to follow-up with our program as needed or in 3 months.    02/01/18  10:46 AM  Patient Care Team:  Zora Jackson MD as PCP - General  Zora Jackson MD as PCP - Family Medicine  Ronny Lowe MD as Cardiologist (Cardiology)  Thomas Yates MD FACS

## 2018-02-03 LAB — VITAMIN D 1,25-DIHYDROXY: 101 PG/ML (ref 19.9–79.3)

## 2018-02-05 RX ORDER — LOSARTAN POTASSIUM AND HYDROCHLOROTHIAZIDE 25; 100 MG/1; MG/1
1 TABLET ORAL DAILY
Qty: 30 TABLET | Refills: 5 | Status: SHIPPED | OUTPATIENT
Start: 2018-02-05 | End: 2018-03-06 | Stop reason: ALTCHOICE

## 2018-02-05 NOTE — TELEPHONE ENCOUNTER
..  Lewis Tejada called requesting a refill of the below medication which has been pended for you:     Requested Prescriptions     Pending Prescriptions Disp Refills    losartan-hydrochlorothiazide (HYZAAR) 100-25 MG per tablet 30 tablet      Sig: Take 1 tablet by mouth       Last Appointment Date: 9/15/2017  Next Appointment Date: 2/15/2018    Allergies   Allergen Reactions    Insect Extract Allergy Skin Test Anaphylaxis     Bee stings    Lortab [Hydrocodone-Acetaminophen] Nausea And Vomiting     Sweats, weak, nausea and vomiting    Other Nausea And Vomiting     Opiates------sweating, weak        Oxycodone-Acetaminophen Nausea And Vomiting     Sweating and vomiting     Ultram [Tramadol Hcl] Nausea And Vomiting     Sweating, weak, nausea and vomiting

## 2018-02-06 ENCOUNTER — TELEPHONE (OUTPATIENT)
Dept: INTERNAL MEDICINE | Age: 68
End: 2018-02-06

## 2018-02-07 ENCOUNTER — TELEPHONE (OUTPATIENT)
Dept: INTERNAL MEDICINE | Age: 68
End: 2018-02-07

## 2018-02-08 RX ORDER — ROPINIROLE 1 MG/1
1 TABLET, FILM COATED ORAL 3 TIMES DAILY
Qty: 90 TABLET | Refills: 3 | Status: SHIPPED | OUTPATIENT
Start: 2018-02-08 | End: 2018-03-06 | Stop reason: ALTCHOICE

## 2018-02-09 ENCOUNTER — CLINICAL SUPPORT NO REQUIREMENTS (OUTPATIENT)
Dept: CARDIOLOGY | Facility: CLINIC | Age: 68
End: 2018-02-09

## 2018-02-09 DIAGNOSIS — Z95.0 PACEMAKER: ICD-10-CM

## 2018-02-09 PROCEDURE — 93294 REM INTERROG EVL PM/LDLS PM: CPT | Performed by: PHYSICIAN ASSISTANT

## 2018-02-09 PROCEDURE — 93296 REM INTERROG EVL PM/IDS: CPT | Performed by: PHYSICIAN ASSISTANT

## 2018-02-13 ENCOUNTER — TELEPHONE (OUTPATIENT)
Dept: INTERNAL MEDICINE | Age: 68
End: 2018-02-13

## 2018-02-13 NOTE — TELEPHONE ENCOUNTER
Voicemail,  [address] [facility] called stating they faxed us over a request for cpap supplies for Ms. Char Hammer and they were just following up on the status of that.   2/13 [id number]

## 2018-02-15 ENCOUNTER — TELEPHONE (OUTPATIENT)
Dept: INTERNAL MEDICINE | Age: 68
End: 2018-02-15

## 2018-02-15 ENCOUNTER — OFFICE VISIT (OUTPATIENT)
Dept: INTERNAL MEDICINE | Age: 68
End: 2018-02-15
Payer: MEDICARE

## 2018-02-15 VITALS
HEART RATE: 85 BPM | HEIGHT: 67 IN | BODY MASS INDEX: 37.98 KG/M2 | OXYGEN SATURATION: 99 % | DIASTOLIC BLOOD PRESSURE: 80 MMHG | SYSTOLIC BLOOD PRESSURE: 126 MMHG | WEIGHT: 242 LBS

## 2018-02-15 DIAGNOSIS — M79.672 LEFT FOOT PAIN: ICD-10-CM

## 2018-02-15 DIAGNOSIS — M25.561 CHRONIC PAIN OF BOTH KNEES: ICD-10-CM

## 2018-02-15 DIAGNOSIS — Z01.818 ENCOUNTER FOR PREOPERATIVE EXAMINATION FOR GENERAL SURGICAL PROCEDURE: Primary | ICD-10-CM

## 2018-02-15 DIAGNOSIS — M25.562 CHRONIC PAIN OF BOTH KNEES: ICD-10-CM

## 2018-02-15 DIAGNOSIS — E11.21 TYPE II DIABETES MELLITUS WITH NEPHROPATHY (HCC): ICD-10-CM

## 2018-02-15 DIAGNOSIS — G89.29 CHRONIC PAIN OF BOTH KNEES: ICD-10-CM

## 2018-02-15 PROCEDURE — 3044F HG A1C LEVEL LT 7.0%: CPT | Performed by: INTERNAL MEDICINE

## 2018-02-15 PROCEDURE — 1123F ACP DISCUSS/DSCN MKR DOCD: CPT | Performed by: INTERNAL MEDICINE

## 2018-02-15 PROCEDURE — 1036F TOBACCO NON-USER: CPT | Performed by: INTERNAL MEDICINE

## 2018-02-15 PROCEDURE — 1090F PRES/ABSN URINE INCON ASSESS: CPT | Performed by: INTERNAL MEDICINE

## 2018-02-15 PROCEDURE — G8400 PT W/DXA NO RESULTS DOC: HCPCS | Performed by: INTERNAL MEDICINE

## 2018-02-15 PROCEDURE — 3014F SCREEN MAMMO DOC REV: CPT | Performed by: INTERNAL MEDICINE

## 2018-02-15 PROCEDURE — 3017F COLORECTAL CA SCREEN DOC REV: CPT | Performed by: INTERNAL MEDICINE

## 2018-02-15 PROCEDURE — 99214 OFFICE O/P EST MOD 30 MIN: CPT | Performed by: INTERNAL MEDICINE

## 2018-02-15 PROCEDURE — G8598 ASA/ANTIPLAT THER USED: HCPCS | Performed by: INTERNAL MEDICINE

## 2018-02-15 PROCEDURE — G8427 DOCREV CUR MEDS BY ELIG CLIN: HCPCS | Performed by: INTERNAL MEDICINE

## 2018-02-15 PROCEDURE — G8417 CALC BMI ABV UP PARAM F/U: HCPCS | Performed by: INTERNAL MEDICINE

## 2018-02-15 PROCEDURE — G8484 FLU IMMUNIZE NO ADMIN: HCPCS | Performed by: INTERNAL MEDICINE

## 2018-02-15 PROCEDURE — 4040F PNEUMOC VAC/ADMIN/RCVD: CPT | Performed by: INTERNAL MEDICINE

## 2018-02-15 ASSESSMENT — PATIENT HEALTH QUESTIONNAIRE - PHQ9
SUM OF ALL RESPONSES TO PHQ9 QUESTIONS 1 & 2: 0
1. LITTLE INTEREST OR PLEASURE IN DOING THINGS: 0
SUM OF ALL RESPONSES TO PHQ QUESTIONS 1-9: 0
2. FEELING DOWN, DEPRESSED OR HOPELESS: 0

## 2018-02-15 ASSESSMENT — ENCOUNTER SYMPTOMS
TROUBLE SWALLOWING: 0
BACK PAIN: 0
VOICE CHANGE: 0
EYE ITCHING: 0
SHORTNESS OF BREATH: 0
COUGH: 0
ABDOMINAL PAIN: 0
SINUS PRESSURE: 0
DIARRHEA: 0
STRIDOR: 0
WHEEZING: 0
COLOR CHANGE: 0
RHINORRHEA: 0
EYE DISCHARGE: 0
VOMITING: 0
BLOOD IN STOOL: 0
SINUS PAIN: 0
ABDOMINAL DISTENTION: 0
NAUSEA: 0
CHEST TIGHTNESS: 0
CONSTIPATION: 0
APNEA: 0
SORE THROAT: 0

## 2018-02-15 NOTE — PROGRESS NOTES
INTESTINE SURGERY      SPLENECTOMY      KRISTEL AND BSO      TONSILLECTOMY AND ADENOIDECTOMY      UPPER GASTROINTESTINAL ENDOSCOPY  12/2011    gerd s/p gastric bypass    UPPER GASTROINTESTINAL ENDOSCOPY  2/2014    normal s/p gastric bypass    UPPER GASTROINTESTINAL ENDOSCOPY  2/2010    biopsy neg Barretts, chronic reflux esophagitis s/p gastric bypass    UPPER GASTROINTESTINAL ENDOSCOPY  7/2006    unremarkable s/p gastric bypass    UPPER GASTROINTESTINAL ENDOSCOPY  8/10/15    Dr Myesha Alcantar UPPER GASTROINTESTINAL ENDOSCOPY  4/1/16    Dr Gasper Beatty N/A 4/1/2016    EGD ESOPHAGOGASTRODUODENOSCOPY performed by Lupe Beaver MD at [facility]      Social History     Social History    Marital status:      Spouse name: N/A    Number of children: N/A    Years of education: N/A     Social History Main Topics    Smoking status: Never Smoker    Smokeless tobacco: Never Used    Alcohol use No    Drug use: No    Sexual activity: Not Currently     Other Topics Concern    None     Social History Narrative    None      Family History   Problem Relation Age of Onset    Uterine Cancer Mother     Cervical Cancer Mother     Coronary Art Dis Mother     Heart Disease Father     Lung Cancer Father     Other Father      renal failure    Colon Cancer Brother     Colon Polyps Brother     Uterine Cancer Maternal Grandmother     Cervical Cancer Maternal Grandmother     Diabetes Maternal Grandmother     Cervical Cancer Sister     Other Sister      fibromyalgia    Diabetes Paternal Aunt     Esophageal Cancer Neg Hx     Liver Cancer Neg Hx     Liver Disease Neg Hx     Stomach Cancer Neg Hx     Rectal Cancer Neg Hx         Current Outpatient Prescriptions   Medication Sig Dispense Refill    rOPINIRole (REQUIP) 1 MG tablet Take 1 tablet by mouth 3 times daily 90 tablet 3    losartan-hydrochlorothiazide (HYZAAR) 100-25 MG per tablet Take 1 tablet by mouth daily 30 tablet 5    tiZANidine (ZANAFLEX) 4 MG tablet Take 1 tablet by mouth every 6 hours as needed (spasm) 30 tablet 1    amLODIPine (NORVASC) 10 MG tablet Take 1 tablet by mouth daily 30 tablet 3    warfarin (COUMADIN) 5 MG tablet Take 2 tablets by mouth daily Take two tablets daily 60 tablet 3    Gabapentin, Once-Daily, 300 MG TABS Take 300 mg by mouth 2 times daily as needed (leg pain). 30 tablet 1    bumetanide (BUMEX) 0.5 MG tablet Take 1 tablet by mouth 2 times daily 60 tablet 1    rOPINIRole (REQUIP) 0.5 MG tablet Take 1 tablet by mouth nightly as needed (leg cramps) 30 tablet 3    clobetasol (TEMOVATE) 0.05 % cream Apply topically 2 times daily. 1 Tube 1    ondansetron (ZOFRAN) 4 MG tablet Take 1 tablet by mouth every 8 hours as needed for Nausea or Vomiting 30 tablet 1    pantoprazole (PROTONIX) 40 MG tablet Take 1 tablet by mouth daily 60 tablet 2    Ergocalciferol (VITAMIN D2 PO) Take 50,000 Units by mouth      sucralfate (CARAFATE) 1 GM/10ML suspension Take 1 g by mouth 4 times daily      escitalopram (LEXAPRO) 10 MG tablet       ranitidine (ZANTAC) 150 MG tablet       levothyroxine (SYNTHROID) 100 MCG tablet Take by mouth      cloNIDine (CATAPRES) 0.1 MG tablet Take 1 tablet by mouth 2 times daily 60 tablet 1    potassium chloride (KLOR-CON M) 10 MEQ extended release tablet Take 1 tablet by mouth daily 30 tablet 3    fluticasone-salmeterol (ADVAIR DISKUS) 250-50 MCG/DOSE AEPB Inhale 1 puff into the lungs every 12 hours.  aspirin 81 MG EC tablet Take 81 mg by mouth daily.  Multiple Vitamin (MULTIVITAMIN PO) Take 1 tablet by mouth daily        No current facility-administered medications for this visit.          Patient Active Problem List   Diagnosis    Vomiting    Burping    GERD (gastroesophageal reflux disease)    History of gastric bypass    Family history of colon cancer    Bloating    Enuresis    Nausea and vomiting    Colon cancer screening    Stable angina ([facility] [address] 37.90 kg/m²   Physical Exam   Constitutional: Vital signs are normal. She appears well-developed and well-nourished. HENT:   Head: Normocephalic and atraumatic. Right Ear: External ear normal.   Left Ear: External ear normal.   Nose: Nose normal.   Mouth/Throat: Oropharynx is clear and moist. No oropharyngeal exudate. Eyes: Pupils are equal, round, and reactive to light. Right eye exhibits no discharge. Left eye exhibits no discharge. Neck: Normal range of motion. No tracheal deviation present. Cardiovascular: Normal rate, regular rhythm and normal heart sounds. Exam reveals no gallop and no friction rub. No murmur heard. Pulmonary/Chest: Effort normal and breath sounds normal. No respiratory distress. She has no wheezes. She has no rales. She exhibits no tenderness. Abdominal: Soft. There is no tenderness. There is no rebound and no guarding. Musculoskeletal: Normal range of motion. She exhibits edema and tenderness. She exhibits no deformity. She does have pain and tenderness on palpation of the left foot. There is a hard nodule on top of the left foot. The left foot is swollen. She also has decreased sensation to the bilateral lower extremities. She has corns and calluses to the bilateral lower extremities. Lymphadenopathy:     She has no cervical adenopathy. Neurological: She is alert. She has normal strength. Skin: Skin is warm, dry and intact. No lesion and no rash noted. No erythema. Psychiatric: She has a normal mood and affect. Her mood appears not anxious. She does not exhibit a depressed mood. Nursing note and vitals reviewed. 1. Left foot pain    2. Type II diabetes mellitus with nephropathy [facility])        ASSESSMENT/PLAN:    69-year-old woman here for preoperative clearance. 1. Chronic knee pain: Patient does need to have knee replacement surgery. She is seen Dr. Sophronia Mcardle. We do not have any labs, x-rays and EKG in order to prepare her for clearance at this time.  She

## 2018-02-16 ENCOUNTER — HOSPITAL ENCOUNTER (OUTPATIENT)
Dept: GENERAL RADIOLOGY | Age: 68
Discharge: HOME | End: 2018-02-16
Payer: MEDICARE

## 2018-02-16 DIAGNOSIS — M79.672 LEFT FOOT PAIN: ICD-10-CM

## 2018-02-16 PROCEDURE — 73630 X-RAY EXAM OF FOOT: CPT

## 2018-02-21 ENCOUNTER — PATIENT MESSAGE (OUTPATIENT)
Dept: INTERNAL MEDICINE | Age: 68
End: 2018-02-21

## 2018-02-21 ENCOUNTER — TELEPHONE (OUTPATIENT)
Dept: INTERNAL MEDICINE | Age: 68
End: 2018-02-21

## 2018-02-21 NOTE — TELEPHONE ENCOUNTER
Called patient back and let her know all of this. Also let her know we have sent dr gonzalez's office our office note from her office visit with us, and all we are waiting on from this point was corrospondance from her cardiologist to Dr. Gsu Hernandez office. She thanked me for calling her back and explaining it all to her. She is no longer concerned with the venus scan that was performed at his office.

## 2018-02-22 ENCOUNTER — ANTI-COAG VISIT (OUTPATIENT)
Dept: INTERNAL MEDICINE | Age: 68
End: 2018-02-22

## 2018-02-22 ENCOUNTER — NURSE ONLY (OUTPATIENT)
Dept: INTERNAL MEDICINE | Age: 68
End: 2018-02-22
Payer: MEDICARE

## 2018-02-22 DIAGNOSIS — Z79.01 ENCOUNTER FOR CURRENT LONG-TERM USE OF ANTICOAGULANTS: Primary | ICD-10-CM

## 2018-02-22 LAB
INR BLD: 1
INTERNATIONAL NORMALIZATION RATIO, POC: 1
PROTHROMBIN TIME, POC: NORMAL

## 2018-02-22 PROCEDURE — 85610 PROTHROMBIN TIME: CPT | Performed by: INTERNAL MEDICINE

## 2018-02-22 NOTE — PROGRESS NOTES
potentially slippery surfaces. · Avoid walking on unfamiliar areas outside. Warfarin and food -- Some foods and supplements can interfere with warfarin's effectiveness. After being stabilized on a particular warfarin dose, consult a healthcare provider before making major dietary changes (eg, starting a diet to lose weight, starting a nutritional supplement or vitamin). · Vitamin K -- Eating an increased amount of foods rich in vitamin K can lower the prothrombin time and INR, making warfarin less effective, and potentially increasing the risk of blood clots. Patients who take warfarin should aim to eat a relatively similar amount of vitamin K each week. Some foods have a high level of vitamin K, including: kale, broccoli, spinach, german or turnip greens, lettuce, [place] sprouts, and cabbage (table 1). It is not necessary to avoid these foods. However, the patient should eat a relatively similar amount on a regular basis rather than eating a large serving occasionally. · Cranberry juice -- There have been mixed reports on the effect of cranberry juice in people who use warfarin to prevent blood clots. Some experts have reported that drinking cranberry juice while on warfarin can cause significant over-anticoagulation and bleeding [1]. However, a small study found that drinking one eight ounce serving of cranberry juice per day for seven days had no effect on the INR of seven men taking warfarin for atrial fibrillation [2]. It is possible that larger amounts could have a more significant effect. The best advice is probably to avoid consuming large amounts of cranberry juice, and to speak with a healthcare provider regarding any concerns about a possible interaction. · Alcohol -- Chronic abuse of alcohol affects the body's ability to handle warfarin. Patients on warfarin therapy should avoid drinking alcohol on a daily basis.  Alcohol should be limited to no more than one to two servings of alcohol occasionally. In addition, drinking excessive amounts of alcohol can increase the risk of injury, and therefore bleeding. Warfarin and medications -- A number of medications, herbs, and vitamins can interact with warfarin (table 2 and table 3). This interaction may affect the action of warfarin or the other medication. If warfarin is affected, the dose may need to be adjusted (up or down) to maintain an optimal coagulation effect. Patients who take warfarin should consult with their clinician before taking any new medication, including over-the-counter (non-prescription) drugs, herbal medicines, vitamins, or any other products. Some of the most common over-the-counter pain relievers, including acetaminophen (Tylenol®), aspirin, and nonsteroidal antiinflammatory drugs (such as ibuprofen [Advil®]) and naproxen (Aleve®), enhance the anticoagulant effects of warfarin. Vitamin E may increase the anticoagulant effects of warfarin. Consult a healthcare provider before adding or changing a dose of vitamin E or any other vitamin. Wear medical identification -- People who require long-term warfarin should wear a bracelet, necklace, or similar alert tag at all times. If an accident occurs and the person is too ill to explain their condition, this will help responders provide appropriate care. The alert tag should include a list of major medical conditions and the reason warfarin is needed (eg, atrial fibrillation), as well as the name and phone number of an emergency contact. One device, Medic Alert®, provides a toll-free number that emergency medical workers can call to find out a person's medical history, list of medications, family emergency contact numbers, and healthcare provider names and numbers.     GRAPHICS: Table 1  Foods with moderate to high levels of vitamin K  Food name Serving size Vitamin K (micrograms)   High level vitamin K foods   Kale, frozen (cooked or boiled, drained) 1/2 cup 570   Kale, fresh,

## 2018-02-22 NOTE — PROGRESS NOTES
Ms. Pretty Valentine was here today. INR today: 1.0       INR Goal: 2.0-3.0    Dosing Plan  As of 2/22/2018    TTR:   --   Full instructions:   10 mg every day             PT REPORTS BEING ON A DIET AND EATING A LOT OF SALADS. PLAN: Per verbal order Dr. Rinaldi Resides, take 15mg tonight, then repeat next week. Continue same dose. DO NOT EAT ANYTHING GREEN. NEXT COUMADIN CLINIC APT IS: 02/27/18 @ 7:45AM         [facility]  [phone]    [facility] SIDE EFFECTS -- The major complication associated with warfarin is bleeding due to excessive anticoagulation. Excessive bleeding, or hemorrhage, can occur from any area of the body, and patients on warfarin should report any falls or accidents, as well as signs or symptoms of bleeding or unusual bruising. Signs of unusual bleeding include bleeding from the gums, blood in the urine, bloody or dark stool, a nosebleed, or vomiting blood. Because the risk of bleeding increases as the INR rises, the INR is closely monitored and adjustments are made as needed to maintain the INR within the target range. Brianna Henry also cause skin necrosis or gangrene, which can cause dark red or black areas on the skin. This is a rare complication that may occur during the first several days of warfarin therapy. When to seek help -- If there are obvious or subtle signs of bleeding, including the following, patients should call their healthcare provider immediately.   · Persistent nausea, stomach upset, or vomiting blood or other material that looks like coffee grounds   · Headaches, dizziness, or weakness   · Nosebleeds   · Dark red or brown urine   · Blood in the bowel movement or dark-colored stool   · Pain, discomfort, or swelling, especially after an injury   · After a serious fall or head injury, even if there are no other symptoms  The patient should also call if any of the following occurs:  · Bleeding from the gums after brushing the teeth   · Swelling or pain at an injection site   · Excessive menstrual bleeding or bleeding between menstrual periods   · Diarrhea, vomiting, or inability to eat for more than 24 hours   · Fever (temperature greater than 100.4º F or 38º C)    It is important to remember that warfarin is taken to reduce the risk of a clotting condition(s), such as a deep vein thrombosis or pulmonary embolism. If one or more of these symptoms develops, the patient should seek immediate medical attention. OTHER RECOMMENDATIONS  Take warfarin on a schedule -- Warfarin should be taken exactly as directed. Do not increase, decrease, or change the dose unless told to do so by a healthcare provider. If a dose is missed or forgotten, call the prescribing clinician for advice. Warfarin tablets come in different strengths; each is usually a different color, with the amount of warfarin (in milligrams) clearly printed on the tablet. If the color or dose of the tablet appears different than those taken previously, the patient should immediately notify their pharmacist or healthcare provider. Reduce the risk of bleeding -- There is a tendency to bleed more easily than usual while taking warfarin. Some simple changes can decrease this risk:  · Use a soft bristle toothbrush   · Floss with waxed floss rather than unwaxed floss   · Shave with an electric razor rather than a blade   · Take care when using sharp objects, such as knives and scissors   · Avoid activities that have a risk of falling or injury (eg, contact sports)  Prevent falls -- Falling may significantly increase the risk of bleeding. Taking measures to prevent falls is recommended, and could include the following:  · Remove loose rugs and electrical cords or any other loose items in the home that could lead to tripping, slipping, and falling. · Ensure that there is adequate lighting in all areas inside and around the home, including stairwells and entrance ways.    · Avoid walking on ice, wet or polished floors, or other potentially slippery surfaces. · Avoid walking on unfamiliar areas outside. Warfarin and food -- Some foods and supplements can interfere with warfarin's effectiveness. After being stabilized on a particular warfarin dose, consult a healthcare provider before making major dietary changes (eg, starting a diet to lose weight, starting a nutritional supplement or vitamin). · Vitamin K -- Eating an increased amount of foods rich in vitamin K can lower the prothrombin time and INR, making warfarin less effective, and potentially increasing the risk of blood clots. Patients who take warfarin should aim to eat a relatively similar amount of vitamin K each week. Some foods have a high level of vitamin K, including: kale, broccoli, spinach, german or turnip greens, lettuce, Campbell sprouts, and cabbage (table 1). It is not necessary to avoid these foods. However, the patient should eat a relatively similar amount on a regular basis rather than eating a large serving occasionally. · Cranberry juice -- There have been mixed reports on the effect of cranberry juice in people who use warfarin to prevent blood clots. Some experts have reported that drinking cranberry juice while on warfarin can cause significant over-anticoagulation and bleeding [1]. However, a small study found that drinking one eight ounce serving of cranberry juice per day for seven days had no effect on the INR of seven men taking warfarin for atrial fibrillation [2]. It is possible that larger amounts could have a more significant effect. The best advice is probably to avoid consuming large amounts of cranberry juice, and to speak with a healthcare provider regarding any concerns about a possible interaction. · Alcohol -- Chronic abuse of alcohol affects the body's ability to handle warfarin. Patients on warfarin therapy should avoid drinking alcohol on a daily basis.  Alcohol should be limited to no more than one to

## 2018-02-23 ENCOUNTER — OFFICE VISIT (OUTPATIENT)
Dept: INTERNAL MEDICINE | Age: 68
End: 2018-02-23
Payer: MEDICARE

## 2018-02-23 ENCOUNTER — HOSPITAL ENCOUNTER (OUTPATIENT)
Dept: PHYSICAL THERAPY | Age: 68
Setting detail: THERAPIES SERIES
Discharge: HOME | End: 2018-02-23
Payer: MEDICARE

## 2018-02-23 VITALS
HEART RATE: 66 BPM | BODY MASS INDEX: 38.8 KG/M2 | WEIGHT: 219 LBS | OXYGEN SATURATION: 98 % | HEIGHT: 63 IN | SYSTOLIC BLOOD PRESSURE: 118 MMHG | DIASTOLIC BLOOD PRESSURE: 70 MMHG

## 2018-02-23 DIAGNOSIS — E11.9 TYPE 2 DIABETES MELLITUS WITHOUT COMPLICATION, WITH LONG-TERM CURRENT USE OF INSULIN (HCC): ICD-10-CM

## 2018-02-23 DIAGNOSIS — I10 ESSENTIAL HYPERTENSION: ICD-10-CM

## 2018-02-23 DIAGNOSIS — Z79.4 TYPE 2 DIABETES MELLITUS WITHOUT COMPLICATION, WITH LONG-TERM CURRENT USE OF INSULIN (HCC): ICD-10-CM

## 2018-02-23 DIAGNOSIS — E53.8 B12 DEFICIENCY: ICD-10-CM

## 2018-02-23 DIAGNOSIS — R42 LIGHTHEADED: Primary | ICD-10-CM

## 2018-02-23 PROCEDURE — G8417 CALC BMI ABV UP PARAM F/U: HCPCS | Performed by: INTERNAL MEDICINE

## 2018-02-23 PROCEDURE — G8484 FLU IMMUNIZE NO ADMIN: HCPCS | Performed by: INTERNAL MEDICINE

## 2018-02-23 PROCEDURE — G8598 ASA/ANTIPLAT THER USED: HCPCS | Performed by: INTERNAL MEDICINE

## 2018-02-23 PROCEDURE — G8979 MOBILITY GOAL STATUS: HCPCS

## 2018-02-23 PROCEDURE — 4040F PNEUMOC VAC/ADMIN/RCVD: CPT | Performed by: INTERNAL MEDICINE

## 2018-02-23 PROCEDURE — 3044F HG A1C LEVEL LT 7.0%: CPT | Performed by: INTERNAL MEDICINE

## 2018-02-23 PROCEDURE — 3017F COLORECTAL CA SCREEN DOC REV: CPT | Performed by: INTERNAL MEDICINE

## 2018-02-23 PROCEDURE — 1090F PRES/ABSN URINE INCON ASSESS: CPT | Performed by: INTERNAL MEDICINE

## 2018-02-23 PROCEDURE — 3014F SCREEN MAMMO DOC REV: CPT | Performed by: INTERNAL MEDICINE

## 2018-02-23 PROCEDURE — G8978 MOBILITY CURRENT STATUS: HCPCS

## 2018-02-23 PROCEDURE — G8427 DOCREV CUR MEDS BY ELIG CLIN: HCPCS | Performed by: INTERNAL MEDICINE

## 2018-02-23 PROCEDURE — 99214 OFFICE O/P EST MOD 30 MIN: CPT | Performed by: INTERNAL MEDICINE

## 2018-02-23 PROCEDURE — 1123F ACP DISCUSS/DSCN MKR DOCD: CPT | Performed by: INTERNAL MEDICINE

## 2018-02-23 PROCEDURE — 1036F TOBACCO NON-USER: CPT | Performed by: INTERNAL MEDICINE

## 2018-02-23 PROCEDURE — 97162 PT EVAL MOD COMPLEX 30 MIN: CPT

## 2018-02-23 PROCEDURE — 97110 THERAPEUTIC EXERCISES: CPT

## 2018-02-23 PROCEDURE — G8400 PT W/DXA NO RESULTS DOC: HCPCS | Performed by: INTERNAL MEDICINE

## 2018-02-23 RX ORDER — AMLODIPINE BESYLATE 10 MG/1
5 TABLET ORAL DAILY
Qty: 30 TABLET | Refills: 3
Start: 2018-02-23 | End: 2018-02-28

## 2018-02-23 ASSESSMENT — ENCOUNTER SYMPTOMS
NAUSEA: 0
EYE DISCHARGE: 0
APNEA: 0
COUGH: 0
CONSTIPATION: 0
RHINORRHEA: 0
DIARRHEA: 0
SORE THROAT: 0
BLOOD IN STOOL: 0
ABDOMINAL DISTENTION: 0
SHORTNESS OF BREATH: 0
COLOR CHANGE: 0
CHEST TIGHTNESS: 0
STRIDOR: 0
SINUS PAIN: 0
TROUBLE SWALLOWING: 0
VOMITING: 0
SINUS PRESSURE: 0
WHEEZING: 0
BACK PAIN: 0
EYE ITCHING: 0
VOICE CHANGE: 0
ABDOMINAL PAIN: 0

## 2018-02-23 ASSESSMENT — PAIN DESCRIPTION - DESCRIPTORS: DESCRIPTORS: ACHING;SORE;TINGLING

## 2018-02-23 ASSESSMENT — PAIN SCALES - GENERAL: PAINLEVEL_OUTOF10: 5

## 2018-02-23 ASSESSMENT — PAIN DESCRIPTION - FREQUENCY: FREQUENCY: CONTINUOUS

## 2018-02-23 ASSESSMENT — PAIN DESCRIPTION - LOCATION: LOCATION: BACK;LEG

## 2018-02-23 ASSESSMENT — PAIN DESCRIPTION - ORIENTATION: ORIENTATION: LEFT;MID;LOWER

## 2018-02-23 NOTE — PROGRESS NOTES
Visual inspection:  Deformity/amputation: present - bilateral toes are crooked  Skin lesions/pre-ulcerative calluses: absent  Edema: right- 2+, left- 2+    Sensory exam:  Monofilament sensation: abnormal - pt could not feel on bilateral heels and only slight sensation on toes  (minimum of 5 random plantar locations tested, avoiding callused areas - > 1 area with absence of sensation is + for neuropathy)    Plus at least one of the following:  Pulses: normal,   Pinprick: N/A  Proprioception: N/A  Vibration (128 Hz): N/A

## 2018-02-23 NOTE — PROGRESS NOTES
Dual Chamber Pacemaker Evaluation Report  [facility]    February 23, 2018    Primary Cardiologist: Mynor  : Medtronic Model: EnRhythm  Implant date: 11/13/09    Reason for evaluation: routine  Indication for pacemaker: sinus node dysfunction    Measurements  Atrial sensing - P wave: 2.5 mV  Atrial threshold: n/r  Atrial lead impedance: 544 ohms  Ventricular sensing - R wave: 4.7 mV  Ventricular threshold: n/r  Ventricular lead impedance:   456 ohms     Diagnostic Data  Atrial paced: 88.9 %  Ventricular paced: 4.3 %  Other: no new events noted  Battery status: satisfactory        Final Parameters  Mode:  AAIR+  Lower rate: 60 bpm   Upper rate: 130 bpm  AV Delay: paced- 180 ms  Sensed-150 ms  Atrial - Amplitude: 2.0 V   Pulse width: 0.4 ms   Sensitivity: 0.6 mV     Ventricular - Amplitude: 3.0 V  Pulse width: 0.4 ms  Sensitivity: 0.9 mV    Changes made: n/a  Conclusions: normal pacemaker function    Follow up: 3 months

## 2018-02-23 NOTE — PROGRESS NOTES
Physical Therapy  Initial Assessment  Date: 2018  Patient Name: Ivonne Lau  MRN: [id number]  : 1950     Treatment Diagnosis: Low back pain    Subjective   General  Chart Reviewed: Yes  Patient assessed for rehabilitation services?: Yes  Additional Pertinent Hx: Ms. Glenis Dexter presents to therapy with complaint of low back pain that began after a fall onto her back. Additional pertinent medical history includes cardiac pacemaker, dizziness, lupus, osteoarthritis, osteoporosis, DM2. Response To Previous Treatment: Not applicable  Family / Caregiver Present: No  Referring Practitioner: TWAN De La Torre  Referral Date : 18  Diagnosis: Acute bilateral low back pain with bilateral sciatics (M54.42)  Follows Commands: Within Functional Limits  General Comment  Comments: Patient reports upcoming R TKA 3/26/2018  PT Visit Information  Onset Date: 18  PT Insurance Information: Medicare, [employer] (no precert required)  Total # of Visits Approved: 12  Total # of Visits to Date: 1  Plan of Care/Certification Expiration Date: 18  Progress Note Due Date: 18  Subjective  Subjective: Ms. Glenis Dexter reports 3 falls recently due to lightheadedness that have resulted in falling onto her back. She states she did not have back pain prior to these falls but is now experiencing this daily. Pain Screening  Patient Currently in Pain: Yes  Pain Assessment  Pain Assessment: 0-10  Pain Level: 5  Pain Location: Back;Leg  Pain Orientation: Left;Mid;Lower  Pain Radiating Towards: Tingling and numbness radiating into leg (L more than R)  Pain Descriptors: Aching;Sore;Tingling  Pain Frequency: Continuous  Pain Intervention(s): Medication (see eMar); Rest  Vital Signs  Patient Currently in Pain: Yes    Vision/Hearing  Vision  Vision: Impaired (Loss of sight R eye)  Hearing  Hearing: Within functional limits    Orientation  Orientation  Overall Orientation Status: Within Normal Limits    Social/Functional History  Social/Functional History  Lives With: Daughter  Type of Home: House  Home Layout: Laundry in basement  Home Access: Stairs to enter with rails  Entrance Stairs - Number of Steps: 5 (Patient reports frequent falls on stairs)  Receives Help From: Family  Homemaking Responsibilities: Yes  Laundry Responsibility: Secondary  Cleaning Responsibility: Secondary  Ambulation Assistance: Independent  Active : Yes (Tries not to drive due to medication/lightheadedness)  Occupation: Part time employment  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[employer], requires desk work  Objective     Observation/Palpation  Posture: Fair  Palpation: Minimal tenderness to palpation over lower back musculature (L greater than R), tenderness surrounding bilateral knee joints    AROM RLE (degrees)  RLE AROM: WFL  AROM LLE (degrees)  LLE AROM : WFL  Spine  Lumbar: Flexion 0-45, Extension 0-15, Rotation full bilaterall with pain to R    Strength RLE  R Hip Flexion: 4+/5  R Hip ABduction: 4/5  R Hip ADduction: 4+/5  R Knee Flexion: 4+/5  R Knee Extension: 5/5  R Ankle Dorsiflexion: 5/5  Strength LLE  L Hip Flexion: 4+/5  L Hip ABduction: 4/5  L Hip ADduction: 4+/5  L Knee Flexion: 4+/5  L Knee Extension: 5/5  L Ankle Dorsiflexion: 5/5        Sensation  Overall Sensation Status: Impaired  Light Touch: Partial deficits in the LLE     Transfers  Sit to Stand: Modified independent  Stand to sit: Modified independent  Bed to Chair: Modified independent  Ambulation  Ambulation?: Yes  Ambulation 1  Surface: level tile  Device: No Device  Assistance: Independent  Quality of Gait: Increased trunk sway bilaterally, decreased pace  Distance: 120'     Exercises  Exercise 1: Seated forward bend lumbar stretch to R   x 5  Exercise 2: Seated lumbar rotation bilateral  x 5  Exercise 3: Seated sidebend to R  - not today  Exercise 4: Seated marching - x 10   Exercise 5: LAQ - x 5  Exercise 6: Hip abduction/adduction  Exercise 7: Hamstring curls  Exercise 8: less than 20 percent impaired, limited or restricted      Goals  Short term goals  Time Frame for Short term goals: 3-4 weeks  Short term goal 1: Patient will be independent with HEP  Short term goal 2: Patient will decrease TTP L lower lumbar region to demonstrate improved muscular biomechanics surrounding lumbar spine  Short term goal 3: Patient will increase lumbar flexion ROM to 0-60 to demonstrate improved functional mobility as needed for ADLS  Long term goals  Time Frame for Long term goals : 4-6 weeks  Long term goal 1: Patient will improve score on Oswestry to 20% impairment or less to demonstrate improved quality of life  Long term goal 2: Patient will increase strength in bilateral LEs to 4+/5 throughout as needed for transfers and ambulation  Long term goal 3: Patient will decrease average pain rating to 2/10 or better to demonstrate improved quality of life  Patient Goals   Patient goals :  To not have back pain       Therapy Time   Individual Concurrent Group Co-treatment   Time In 0900         Time Out 0940         Minutes 40         Timed Code Treatment Minutes: 36 Minutes       Ashley Wall PT   Electronically signed by Ashley Wall PT on 2/23/2018 at 2:12 PM

## 2018-02-23 NOTE — PROGRESS NOTES
CARDIAC CATHETERIZATION  10/21/13  [address]    EF over 60%    CHOLECYSTECTOMY      COLONOSCOPY  2/2010    negative    COLONOSCOPY  2/22/10    Dr Michelle Moore  4/1/16    Dr LAKHWINDER Horvath-internal hemorrhoids, 5 yr recall    EYE SURGERY      Cyst on Right eye    GASTRIC BYPASS SURGERY      HERNIA REPAIR      HYSTERECTOMY      Complete    HYSTERECTOMY      Partial - because had a tubal pregnancy.      INCONTINENCE SURGERY      Bladder Sling    OTHER SURGICAL HISTORY      IVC filter    PACEMAKER INSERTION      SMALL INTESTINE SURGERY      SPLENECTOMY      KRISTEL AND BSO      TONSILLECTOMY AND ADENOIDECTOMY      UPPER GASTROINTESTINAL ENDOSCOPY  12/2011    gerd s/p gastric bypass    UPPER GASTROINTESTINAL ENDOSCOPY  2/2014    normal s/p gastric bypass    UPPER GASTROINTESTINAL ENDOSCOPY  2/2010    biopsy neg Barretts, chronic reflux esophagitis s/p gastric bypass    UPPER GASTROINTESTINAL ENDOSCOPY  7/2006    unremarkable s/p gastric bypass    UPPER GASTROINTESTINAL ENDOSCOPY  8/10/15    Dr Rafia Roca UPPER GASTROINTESTINAL ENDOSCOPY  4/1/16    Dr Shanta Hartley    UPPER GASTROINTESTINAL ENDOSCOPY N/A 4/1/2016    EGD ESOPHAGOGASTRODUOD[facility]OS[facility] [address] [facility]      Social History     Social History    Marital status:      Spouse name: N/A    Number of children: N/A    Years of education: N/A     Social History Main Topics    Smoking status: Never Smoker    Smokeless tobacco: Never Used    Alcohol use No    Drug use: No    Sexual activity: Not Currently     Other Topics Concern    Not on file     Social History Narrative    No narrative on file      Family History   Problem Relation Age of Onset    Uterine Cancer Mother     Cervical Cancer Mother     Coronary Art Dis Mother     Heart Disease Father     Lung Cancer Father     Other Father      renal failure    Colon Cancer Brother     Colon Polyps Brother     Uterine Cancer Maternal Grandmother     not exhibit a depressed mood. Nursing note and vitals reviewed. 1. Essential hypertension        ASSESSMENT/PLAN:    71-year-old woman here for follow-up and evaluation of lightheadedness and dizziness    1. Lightheadedness and dizziness: Her blood pressure is actually pretty well controlled today. However, her blood pressure has been extremely high for quite some time. He may be that her body has not yet had a chance to adjust to the increased dose of Norvasc. We'll reduce her Norvasc back to 5 mg. We'll have her come back in about 3-4 days for follow-up. 2. B12 injection given today    3. Diabetic foot exam done per nurse today    Josie Burnett was seen today for check-up and diabetes. Diagnoses and all orders for this visit:    Essential hypertension  -     amLODIPine (NORVASC) 10 MG tablet; Take 0.5 tablets by mouth daily    Other orders  -     Gabapentin, Once-Daily, 300 MG TABS; Take 300 mg by mouth 2 times daily as needed (leg pain). Return in about 4 days (around 2/27/2018), or evaluate hypotension. No orders of the defined types were placed in this encounter.       Jacky Evangelista MD

## 2018-02-23 NOTE — PATIENT INSTRUCTIONS
your chest pain is severe or ongoing. If you are being treated for high blood pressure, keep using amlodipine even if you feel well. High blood pressure often has no symptoms. You may need to use blood pressure medicine for the rest of your life. Your hypertension or heart condition may be treated with a combination of drugs. Use all medications as directed by your doctor. Read the medication guide or patient instructions provided with each medication. Do not change your doses or stop taking any of your medications without your doctor's advice. This is especially important if you also take nitroglycerin. Amlodipine is only part of a complete program of treatment that may also include diet, exercise, weight control, and other medications. Follow your diet, medication, and exercise routines very closely. Store at room temperature away from moisture, heat, and light. What happens if I miss a dose? Take the missed dose as soon as you remember. If you are more than 12 hours late, skip the missed dose. Do not  take extra medicine to make up the missed dose. What happens if I overdose? Seek emergency medical attention or call the Poison Help line at 1-854.877.9133. Overdose symptoms may include rapid heartbeats, redness or warmth in your arms or legs, or fainting. What should I avoid while taking amlodipine? Avoid getting up too fast from a sitting or lying position, or you may feel dizzy. Get up slowly and steady yourself to prevent a fall. What are the possible side effects of amlodipine? Get emergency medical help if you have signs of an allergic reaction:  hives; difficulty breathing; swelling of your face, lips, tongue, or throat. In rare cases, when you first start taking amlodipine, your angina may get worse or you could have a heart attack.  Seek emergency medical attention or call your doctor right away if you have symptoms such as: chest pain or pressure, pain spreading to your jaw or shoulder, nausea, sweating. Call your doctor at once if you have:  · pounding heartbeats or fluttering in your chest;  · worsening chest pain;  · swelling in your feet or ankles;  · severe drowsiness; or  · a light-headed feeling, like you might pass out. Common side effects may include:  · dizziness;  · feeling tired;  · stomach pain, nausea; or  · flushing (warmth, redness, or tingly feeling). This is not a complete list of side effects and others may occur. Call your doctor for medical advice about side effects. You may report side effects to FDA at 3-593-QWW-2632. What other drugs will affect amlodipine? Tell your doctor about all your current medicines and any you start or stop using, especially:  · nitroglycerin;  · simvastatin (Zocor, Simcor, Vytorin); or  · any other heart or blood pressure medications. This list is not complete. Other drugs may interact with amlodipine, including prescription and over-the-counter medicines, vitamins, and herbal products. Not all possible interactions are listed in this medication guide. Where can I get more information? Your pharmacist can provide more information about amlodipine. Remember, keep this and all other medicines out of the reach of children, never share your medicines with others, and use this medication only for the indication prescribed. Every effort has been made to ensure that the information provided by [address] is accurate, up-to-date, and complete, but no guarantee is made to that effect. Drug information contained herein may be time sensitive. [facility] information has been compiled for use by healthcare practitioners and consumers in the [place] and therefore [employer] does not warrant that uses outside of the [place] are appropriate, unless specifically indicated otherwise. [facility]'s drug information does not endorse drugs, diagnose patients or recommend therapy.  [facility]'s drug information is an informational resource

## 2018-02-27 ENCOUNTER — NURSE ONLY (OUTPATIENT)
Dept: INTERNAL MEDICINE | Age: 68
End: 2018-02-27
Payer: MEDICARE

## 2018-02-27 ENCOUNTER — OFFICE VISIT (OUTPATIENT)
Dept: INTERNAL MEDICINE | Age: 68
End: 2018-02-27
Payer: MEDICARE

## 2018-02-27 ENCOUNTER — ANTI-COAG VISIT (OUTPATIENT)
Dept: INTERNAL MEDICINE | Age: 68
End: 2018-02-27

## 2018-02-27 VITALS
HEIGHT: 67 IN | OXYGEN SATURATION: 98 % | SYSTOLIC BLOOD PRESSURE: 90 MMHG | DIASTOLIC BLOOD PRESSURE: 58 MMHG | BODY MASS INDEX: 36.57 KG/M2 | WEIGHT: 233 LBS | HEART RATE: 57 BPM | TEMPERATURE: 97.3 F

## 2018-02-27 DIAGNOSIS — N17.9 ACUTE RENAL FAILURE SUPERIMPOSED ON CHRONIC KIDNEY DISEASE, UNSPECIFIED CKD STAGE, UNSPECIFIED ACUTE RENAL FAILURE TYPE (HCC): ICD-10-CM

## 2018-02-27 DIAGNOSIS — R53.83 OTHER FATIGUE: ICD-10-CM

## 2018-02-27 DIAGNOSIS — R06.02 SHORTNESS OF BREATH: ICD-10-CM

## 2018-02-27 DIAGNOSIS — N18.9 ACUTE RENAL FAILURE SUPERIMPOSED ON CHRONIC KIDNEY DISEASE, UNSPECIFIED CKD STAGE, UNSPECIFIED ACUTE RENAL FAILURE TYPE (HCC): ICD-10-CM

## 2018-02-27 DIAGNOSIS — Z79.01 ENCOUNTER FOR CURRENT LONG-TERM USE OF ANTICOAGULANTS: Primary | ICD-10-CM

## 2018-02-27 DIAGNOSIS — I95.9 HYPOTENSION, UNSPECIFIED HYPOTENSION TYPE: ICD-10-CM

## 2018-02-27 DIAGNOSIS — I95.9 HYPOTENSION, UNSPECIFIED HYPOTENSION TYPE: Primary | ICD-10-CM

## 2018-02-27 LAB
ALBUMIN SERPL-MCNC: 4.1 G/DL (ref 3.5–5.2)
ALP BLD-CCNC: 85 U/L (ref 35–104)
ALT SERPL-CCNC: 12 U/L (ref 5–33)
ANION GAP SERPL CALCULATED.3IONS-SCNC: 12 MMOL/L (ref 7–19)
AST SERPL-CCNC: 30 U/L (ref 5–32)
BASOPHILS ABSOLUTE: 0 K/UL (ref 0–0.2)
BASOPHILS RELATIVE PERCENT: 0.4 % (ref 0–1)
BILIRUB SERPL-MCNC: 0.4 MG/DL (ref 0.2–1.2)
BUN BLDV-MCNC: 40 MG/DL (ref 8–23)
CALCIUM SERPL-MCNC: 9.6 MG/DL (ref 8.8–10.2)
CHLORIDE BLD-SCNC: 97 MMOL/L (ref 98–111)
CO2: 31 MMOL/L (ref 22–29)
CREAT SERPL-MCNC: 2.6 MG/DL (ref 0.5–0.9)
EOSINOPHILS ABSOLUTE: 0.1 K/UL (ref 0–0.6)
EOSINOPHILS RELATIVE PERCENT: 1.1 % (ref 0–5)
GFR NON-AFRICAN AMERICAN: 18
GLUCOSE BLD-MCNC: 102 MG/DL (ref 74–109)
HCT VFR BLD CALC: 39.5 % (ref 37–47)
HEMOGLOBIN: 12.2 G/DL (ref 12–16)
INTERNATIONAL NORMALIZATION RATIO, POC: 1
LYMPHOCYTES ABSOLUTE: 1.8 K/UL (ref 1.1–4.5)
LYMPHOCYTES RELATIVE PERCENT: 34.9 % (ref 20–40)
MCH RBC QN AUTO: 28 PG (ref 27–31)
MCHC RBC AUTO-ENTMCNC: 30.9 G/DL (ref 33–37)
MCV RBC AUTO: 90.8 FL (ref 81–99)
MONOCYTES ABSOLUTE: 0.6 K/UL (ref 0–0.9)
MONOCYTES RELATIVE PERCENT: 11.6 % (ref 0–10)
NEUTROPHILS ABSOLUTE: 2.7 K/UL (ref 1.5–7.5)
NEUTROPHILS RELATIVE PERCENT: 51.6 % (ref 50–65)
PDW BLD-RTO: 15.8 % (ref 11.5–14.5)
PLATELET # BLD: 217 K/UL (ref 130–400)
PMV BLD AUTO: 12 FL (ref 9.4–12.3)
POTASSIUM SERPL-SCNC: 4.7 MMOL/L (ref 3.5–5)
PROTHROMBIN TIME, POC: NORMAL
RBC # BLD: 4.35 M/UL (ref 4.2–5.4)
SODIUM BLD-SCNC: 140 MMOL/L (ref 136–145)
TOTAL PROTEIN: 7.9 G/DL (ref 6.6–8.7)
WBC # BLD: 5.2 K/UL (ref 4.8–10.8)

## 2018-02-27 PROCEDURE — 99214 OFFICE O/P EST MOD 30 MIN: CPT | Performed by: NURSE PRACTITIONER

## 2018-02-27 PROCEDURE — 85610 PROTHROMBIN TIME: CPT | Performed by: INTERNAL MEDICINE

## 2018-02-27 PROCEDURE — G8417 CALC BMI ABV UP PARAM F/U: HCPCS | Performed by: NURSE PRACTITIONER

## 2018-02-27 PROCEDURE — 3017F COLORECTAL CA SCREEN DOC REV: CPT | Performed by: NURSE PRACTITIONER

## 2018-02-27 PROCEDURE — 4040F PNEUMOC VAC/ADMIN/RCVD: CPT | Performed by: NURSE PRACTITIONER

## 2018-02-27 PROCEDURE — G8400 PT W/DXA NO RESULTS DOC: HCPCS | Performed by: NURSE PRACTITIONER

## 2018-02-27 PROCEDURE — 1090F PRES/ABSN URINE INCON ASSESS: CPT | Performed by: NURSE PRACTITIONER

## 2018-02-27 PROCEDURE — G8598 ASA/ANTIPLAT THER USED: HCPCS | Performed by: NURSE PRACTITIONER

## 2018-02-27 PROCEDURE — G8427 DOCREV CUR MEDS BY ELIG CLIN: HCPCS | Performed by: NURSE PRACTITIONER

## 2018-02-27 PROCEDURE — 1036F TOBACCO NON-USER: CPT | Performed by: NURSE PRACTITIONER

## 2018-02-27 PROCEDURE — G8484 FLU IMMUNIZE NO ADMIN: HCPCS | Performed by: NURSE PRACTITIONER

## 2018-02-27 PROCEDURE — 3014F SCREEN MAMMO DOC REV: CPT | Performed by: NURSE PRACTITIONER

## 2018-02-27 PROCEDURE — 1123F ACP DISCUSS/DSCN MKR DOCD: CPT | Performed by: NURSE PRACTITIONER

## 2018-02-27 ASSESSMENT — ENCOUNTER SYMPTOMS
VOICE CHANGE: 0
RHINORRHEA: 0
BLOOD IN STOOL: 0
PHOTOPHOBIA: 0
COUGH: 0
SHORTNESS OF BREATH: 1
BACK PAIN: 0
VOMITING: 0
NAUSEA: 0
COLOR CHANGE: 0
ABDOMINAL PAIN: 0

## 2018-02-27 NOTE — PROGRESS NOTES
GASTROINTESTINAL ENDOSCOPY N/A 4/1/2016    EGD ESOPHAGOGASTRODUODENOSCOPY performed by Rafael Burnett MD at [address] [facility]       Vitals 2/27/2018 2/23/2018 2/15/2018 1/31/2018 10/23/2017 02/91/6731   SYSTOLIC 90 265 [phone]   DIASTOLIC 58 70 80 [phone]   Site Left Arm Left Arm - - - -   Position Sitting Sitting - - - -   Cuff Size - Medium Adult - - - -   Pulse 57 66 85 62 64 63   Temp 97.3 - - 96.9 97.8 98   Resp - - - - 18 17   Weight 233 lb 219 lb 242 lb 241 lb 6.4 oz 244 lb -   Height 5' 7\" 5' 2.5\" 5' 7\" 5' 7\" 5' 7\" -   BMI (wt*703/ht~2) 36.49 kg/m2 39.41 kg/m2 37.9 kg/m2 37.8 kg/m2 38.21 kg/m2 -   Pain Level - 5 - - - -   Some recent data might be hidden       Family History   Problem Relation Age of Onset    Uterine Cancer Mother     Cervical Cancer Mother     Coronary Art Dis Mother     Heart Disease Father     Lung Cancer Father     Other Father      renal failure    Colon Cancer Brother     Colon Polyps Brother     Uterine Cancer Maternal Grandmother     Cervical Cancer Maternal Grandmother     Diabetes Maternal Grandmother     Cervical Cancer Sister     Other Sister      fibromyalgia    Diabetes Paternal Aunt     Esophageal Cancer Neg Hx     Liver Cancer Neg Hx     Liver Disease Neg Hx     Stomach Cancer Neg Hx     Rectal Cancer Neg Hx        Social History   Substance Use Topics    Smoking status: Never Smoker    Smokeless tobacco: Never Used    Alcohol use No      Current Outpatient Prescriptions   Medication Sig Dispense Refill    Gabapentin, Once-Daily, 300 MG TABS Take 300 mg by mouth 2 times daily as needed (leg pain).  30 tablet 1    amLODIPine (NORVASC) 10 MG tablet Take 0.5 tablets by mouth daily 30 tablet 3    rOPINIRole (REQUIP) 1 MG tablet Take 1 tablet by mouth 3 times daily 90 tablet 3    losartan-hydrochlorothiazide (HYZAAR) 100-25 MG per tablet Take 1 tablet by mouth daily 30 tablet 5    tiZANidine (ZANAFLEX) 4 MG tablet Take 1 tablet by mouth every 6 hours

## 2018-02-27 NOTE — PATIENT INSTRUCTIONS
1. Stop taking Norvasc until follow-up in 1 week. 2. Labs today. 3. Get up slowly; stay hydrated. 4. Go to ED should symptoms worsen. 5. Make appt TODAY with nephrology.

## 2018-02-27 NOTE — PROGRESS NOTES
· Excessive menstrual bleeding or bleeding between menstrual periods   · Diarrhea, vomiting, or inability to eat for more than 24 hours   · Fever (temperature greater than 100.4º F or 38º C)    It is important to remember that warfarin is taken to reduce the risk of a clotting condition(s), such as a deep vein thrombosis or pulmonary embolism. If one or more of these symptoms develops, the patient should seek immediate medical attention. OTHER RECOMMENDATIONS  Take warfarin on a schedule -- Warfarin should be taken exactly as directed. Do not increase, decrease, or change the dose unless told to do so by a healthcare provider. If a dose is missed or forgotten, call the prescribing clinician for advice. Warfarin tablets come in different strengths; each is usually a different color, with the amount of warfarin (in milligrams) clearly printed on the tablet. If the color or dose of the tablet appears different than those taken previously, the patient should immediately notify their pharmacist or healthcare provider. Reduce the risk of bleeding -- There is a tendency to bleed more easily than usual while taking warfarin. Some simple changes can decrease this risk:  · Use a soft bristle toothbrush   · Floss with waxed floss rather than unwaxed floss   · Shave with an electric razor rather than a blade   · Take care when using sharp objects, such as knives and scissors   · Avoid activities that have a risk of falling or injury (eg, contact sports)  Prevent falls -- Falling may significantly increase the risk of bleeding. Taking measures to prevent falls is recommended, and could include the following:  · Remove loose rugs and electrical cords or any other loose items in the home that could lead to tripping, slipping, and falling. · Ensure that there is adequate lighting in all areas inside and around the home, including stairwells and entrance ways.    · Avoid walking on ice, wet or polished floors, or other potentially slippery surfaces. · Avoid walking on unfamiliar areas outside. Warfarin and food -- Some foods and supplements can interfere with warfarin's effectiveness. After being stabilized on a particular warfarin dose, consult a healthcare provider before making major dietary changes (eg, starting a diet to lose weight, starting a nutritional supplement or vitamin). · Vitamin K -- Eating an increased amount of foods rich in vitamin K can lower the prothrombin time and INR, making warfarin less effective, and potentially increasing the risk of blood clots. Patients who take warfarin should aim to eat a relatively similar amount of vitamin K each week. Some foods have a high level of vitamin K, including: kale, broccoli, spinach, german or turnip greens, lettuce, [place] sprouts, and cabbage (table 1). It is not necessary to avoid these foods. However, the patient should eat a relatively similar amount on a regular basis rather than eating a large serving occasionally. · Cranberry juice -- There have been mixed reports on the effect of cranberry juice in people who use warfarin to prevent blood clots. Some experts have reported that drinking cranberry juice while on warfarin can cause significant over-anticoagulation and bleeding [1]. However, a small study found that drinking one eight ounce serving of cranberry juice per day for seven days had no effect on the INR of seven men taking warfarin for atrial fibrillation [2]. It is possible that larger amounts could have a more significant effect. The best advice is probably to avoid consuming large amounts of cranberry juice, and to speak with a healthcare provider regarding any concerns about a possible interaction. · Alcohol -- Chronic abuse of alcohol affects the body's ability to handle warfarin. Patients on warfarin therapy should avoid drinking alcohol on a daily basis.  Alcohol should be limited to no more than one to two servings of alcohol occasionally. In addition, drinking excessive amounts of alcohol can increase the risk of injury, and therefore bleeding. Warfarin and medications -- A number of medications, herbs, and vitamins can interact with warfarin (table 2 and table 3). This interaction may affect the action of warfarin or the other medication. If warfarin is affected, the dose may need to be adjusted (up or down) to maintain an optimal coagulation effect. Patients who take warfarin should consult with their clinician before taking any new medication, including over-the-counter (non-prescription) drugs, herbal medicines, vitamins, or any other products. Some of the most common over-the-counter pain relievers, including acetaminophen (Tylenol®), aspirin, and nonsteroidal antiinflammatory drugs (such as ibuprofen [Advil®]) and naproxen (Aleve®), enhance the anticoagulant effects of warfarin. Vitamin E may increase the anticoagulant effects of warfarin. Consult a healthcare provider before adding or changing a dose of vitamin E or any other vitamin. Wear medical identification -- People who require long-term warfarin should wear a bracelet, necklace, or similar alert tag at all times. If an accident occurs and the person is too ill to explain their condition, this will help responders provide appropriate care. The alert tag should include a list of major medical conditions and the reason warfarin is needed (eg, atrial fibrillation), as well as the name and phone number of an emergency contact. One device, Medic Alert®, provides a toll-free number that emergency medical workers can call to find out a person's medical history, list of medications, family emergency contact numbers, and healthcare provider names and numbers.     GRAPHICS: Table 1  Foods with moderate to high levels of vitamin K  Food name Serving size Vitamin K (micrograms)   High level vitamin K foods   Kale, frozen (cooked or boiled, drained) 1/2 cup 570   Kale, fresh, (cooked or boiled, drained) 1/2 cup 530   Spinach, frozen (cooked or boiled, drained) 1/2 cup 514   Spinach, raw 1 cup 150   Bernabe greens, frozen (cooked, drained) 1/2 cup 530   Turnip greens, frozen (cooked, drained) 1/2 cup 425   [place] sprouts, frozen (cooked, drained) 1/2 cup 110   Moderate level vitamin K foods   Asparagus, frozen (cooked, drained) 1/2 cup  4 gauthier 72  48   Asparagus, fresh (cooked, drained) 4 gauthier 30   Broccoli, frozen (cooked, drained) 1/2 cup 60   Broccoli, fresh (cooked, drained) 1 spear 52   Broccoli, raw 1/2 cup 40   Lettuce (butterhead, [place], rebecca) 1/2 head 80   Lettuce (iceberg, crisphead) 1/2 head 65   Lettuce (cale, cos) 1 cup 57   Lettuce (green leaf) 1 cup 97   Okra, fresh (cooked, drained) 1/2 cup 32   Okra, frozen (cooked, drained) 1/2 cup 44   Cabbage (cooked, drained) 1/2 cup 73   Cabbage, raw 1/2 cup 21   Cabbage, ramses (raw) 1/2 cup 24   Cabbage, Chinese (cooked, drained) 1/2 cup 28   Coleslaw (fast food-type) 3/4 cup 56   Sauerkraut, canned 1/2 cup 41   Peas, frozen, with pod (cooked, drained) 1/2 cup 24   Peas, fresh, with pod (cooked, drained) 1/2 cup 20   Peas, green, frozen (cooked, drained) 1/2 cup 18   Celery, raw 1/2 cup 17   Beans, green or yellow, fresh (cooked, drained) 1/2 cup 10   Oil, canola 1 tablespoon 17   Oil, olive 1 tablespoon 8   Oil, other (including peanut, sesame, safflower, corn, sunflower, soybean) 1 tablespoon 3 or less   Green tea, brewed in hot water 3.5 ounces 0.3   Data from: [employer]. USDA Nutrient Database for Standard Reference. Nutrient Data Laboratory Home Page, [employer]commend[employer].

## 2018-02-28 ENCOUNTER — TELEPHONE (OUTPATIENT)
Dept: CARDIOLOGY | Facility: CLINIC | Age: 68
End: 2018-02-28

## 2018-02-28 ENCOUNTER — HOSPITAL ENCOUNTER (EMERGENCY)
Age: 68
Discharge: HOME | End: 2018-02-28
Attending: FAMILY MEDICINE
Payer: MEDICARE

## 2018-02-28 ENCOUNTER — APPOINTMENT (OUTPATIENT)
Dept: GENERAL RADIOLOGY | Age: 68
End: 2018-02-28
Payer: MEDICARE

## 2018-02-28 ENCOUNTER — HOSPITAL ENCOUNTER (OUTPATIENT)
Dept: PHYSICAL THERAPY | Age: 68
Setting detail: THERAPIES SERIES
Discharge: HOME | End: 2018-02-28
Payer: MEDICARE

## 2018-02-28 VITALS
RESPIRATION RATE: 16 BRPM | DIASTOLIC BLOOD PRESSURE: 78 MMHG | OXYGEN SATURATION: 98 % | HEIGHT: 67 IN | TEMPERATURE: 98.7 F | WEIGHT: 222 LBS | HEART RATE: 75 BPM | BODY MASS INDEX: 34.84 KG/M2 | SYSTOLIC BLOOD PRESSURE: 125 MMHG

## 2018-02-28 DIAGNOSIS — N17.9 ACUTE KIDNEY INJURY (HCC): Primary | ICD-10-CM

## 2018-02-28 DIAGNOSIS — R42 LIGHTHEADEDNESS: ICD-10-CM

## 2018-02-28 DIAGNOSIS — R55 SYNCOPE AND COLLAPSE: ICD-10-CM

## 2018-02-28 LAB
ALBUMIN SERPL-MCNC: 4 G/DL (ref 3.5–5.2)
ALP BLD-CCNC: 80 U/L (ref 35–104)
ALT SERPL-CCNC: 12 U/L (ref 5–33)
ANION GAP SERPL CALCULATED.3IONS-SCNC: 18 MMOL/L (ref 7–19)
AST SERPL-CCNC: 26 U/L (ref 5–32)
BASOPHILS ABSOLUTE: 0 K/UL (ref 0–0.2)
BASOPHILS RELATIVE PERCENT: 0.6 % (ref 0–1)
BILIRUB SERPL-MCNC: 0.3 MG/DL (ref 0.2–1.2)
BUN BLDV-MCNC: 51 MG/DL (ref 8–23)
C-REACTIVE PROTEIN: 0.09 MG/DL (ref 0–0.5)
CALCIUM SERPL-MCNC: 9 MG/DL (ref 8.8–10.2)
CHLORIDE BLD-SCNC: 96 MMOL/L (ref 98–111)
CO2: 26 MMOL/L (ref 22–29)
CREAT SERPL-MCNC: 2.5 MG/DL (ref 0.5–0.9)
EOSINOPHILS ABSOLUTE: 0.1 K/UL (ref 0–0.6)
EOSINOPHILS RELATIVE PERCENT: 0.8 % (ref 0–5)
GFR NON-AFRICAN AMERICAN: 19
GLUCOSE BLD-MCNC: 164 MG/DL (ref 74–109)
HCT VFR BLD CALC: 36.8 % (ref 37–47)
HEMOGLOBIN: 11.8 G/DL (ref 12–16)
LYMPHOCYTES ABSOLUTE: 2.4 K/UL (ref 1.1–4.5)
LYMPHOCYTES RELATIVE PERCENT: 38.6 % (ref 20–40)
MAGNESIUM: 1.9 MG/DL (ref 1.6–2.4)
MCH RBC QN AUTO: 28.9 PG (ref 27–31)
MCHC RBC AUTO-ENTMCNC: 32.1 G/DL (ref 33–37)
MCV RBC AUTO: 90 FL (ref 81–99)
MONOCYTES ABSOLUTE: 0.7 K/UL (ref 0–0.9)
MONOCYTES RELATIVE PERCENT: 11.9 % (ref 0–10)
NEUTROPHILS ABSOLUTE: 3 K/UL (ref 1.5–7.5)
NEUTROPHILS RELATIVE PERCENT: 47.9 % (ref 50–65)
PDW BLD-RTO: 15.7 % (ref 11.5–14.5)
PERFORMED ON: NORMAL
PLATELET # BLD: 228 K/UL (ref 130–400)
PMV BLD AUTO: 12.4 FL (ref 9.4–12.3)
POC TROPONIN I: 0.01 NG/ML (ref 0–0.08)
POTASSIUM SERPL-SCNC: 3.9 MMOL/L (ref 3.5–5)
PRO-BNP: 166 PG/ML (ref 0–900)
RBC # BLD: 4.09 M/UL (ref 4.2–5.4)
SODIUM BLD-SCNC: 140 MMOL/L (ref 136–145)
TOTAL PROTEIN: 7.3 G/DL (ref 6.6–8.7)
TSH SERPL DL<=0.05 MIU/L-ACNC: 3.66 UIU/ML (ref 0.27–4.2)
WBC # BLD: 6.2 K/UL (ref 4.8–10.8)

## 2018-02-28 PROCEDURE — 83880 ASSAY OF NATRIURETIC PEPTIDE: CPT

## 2018-02-28 PROCEDURE — 83735 ASSAY OF MAGNESIUM: CPT

## 2018-02-28 PROCEDURE — 71045 X-RAY EXAM CHEST 1 VIEW: CPT

## 2018-02-28 PROCEDURE — 99284 EMERGENCY DEPT VISIT MOD MDM: CPT

## 2018-02-28 PROCEDURE — 85025 COMPLETE CBC W/AUTO DIFF WBC: CPT

## 2018-02-28 PROCEDURE — 80053 COMPREHEN METABOLIC PANEL: CPT

## 2018-02-28 PROCEDURE — 84443 ASSAY THYROID STIM HORMONE: CPT

## 2018-02-28 PROCEDURE — 2580000003 HC RX 258: Performed by: FAMILY MEDICINE

## 2018-02-28 PROCEDURE — 86140 C-REACTIVE PROTEIN: CPT

## 2018-02-28 PROCEDURE — 99284 EMERGENCY DEPT VISIT MOD MDM: CPT | Performed by: FAMILY MEDICINE

## 2018-02-28 PROCEDURE — 36415 COLL VENOUS BLD VENIPUNCTURE: CPT

## 2018-02-28 PROCEDURE — 84484 ASSAY OF TROPONIN QUANT: CPT

## 2018-02-28 RX ORDER — 0.9 % SODIUM CHLORIDE 0.9 %
1000 INTRAVENOUS SOLUTION INTRAVENOUS ONCE
Status: DISCONTINUED | OUTPATIENT
Start: 2018-02-28 | End: 2018-02-28 | Stop reason: HOSPADM

## 2018-02-28 RX ORDER — SODIUM CHLORIDE 9 MG/ML
INJECTION, SOLUTION INTRAVENOUS CONTINUOUS
Status: DISCONTINUED | OUTPATIENT
Start: 2018-02-28 | End: 2018-02-28 | Stop reason: HOSPADM

## 2018-02-28 RX ORDER — 0.9 % SODIUM CHLORIDE 0.9 %
1000 INTRAVENOUS SOLUTION INTRAVENOUS ONCE
Status: COMPLETED | OUTPATIENT
Start: 2018-02-28 | End: 2018-02-28

## 2018-02-28 RX ADMIN — SODIUM CHLORIDE: 9 INJECTION, SOLUTION INTRAVENOUS at 16:21

## 2018-02-28 RX ADMIN — SODIUM CHLORIDE 1000 ML: 9 INJECTION, SOLUTION INTRAVENOUS at 18:33

## 2018-02-28 ASSESSMENT — ENCOUNTER SYMPTOMS
CONSTIPATION: 0
DIARRHEA: 0
SINUS PAIN: 0
WHEEZING: 0
COLOR CHANGE: 0
BACK PAIN: 0
SHORTNESS OF BREATH: 0
VOMITING: 0
COUGH: 0
CHEST TIGHTNESS: 0
RHINORRHEA: 0
NAUSEA: 0
ABDOMINAL PAIN: 0

## 2018-02-28 NOTE — ED PROVIDER NOTES
TONSILLECTOMY AND ADENOIDECTOMY      UPPER GASTROINTESTINAL ENDOSCOPY  12/2011    gerd s/p gastric bypass    UPPER GASTROINTESTINAL ENDOSCOPY  2/2014    normal s/p gastric bypass    UPPER GASTROINTESTINAL ENDOSCOPY  2/2010    biopsy neg Barretts, chronic reflux esophagitis s/p gastric bypass    UPPER GASTROINTESTINAL ENDOSCOPY  7/2006    unremarkable s/p gastric bypass    UPPER GASTROINTESTINAL ENDOSCOPY  8/10/15    Dr Jess Saul ENDOSCOPY  4/1/16    Dr Melania Arndt N/A 4/1/2016    EGD ESOPHAGOGASTRODUODENOSCOPY performed by Cassandra Tao MD at [facility]         CURRENT MEDICATIONS       Current Discharge Medication List      CONTINUE these medications which have NOT CHANGED    Details   !! rOPINIRole (REQUIP) 1 MG tablet Take 1 tablet by mouth 3 times daily  Qty: 90 tablet, Refills: 3      aspirin 81 MG EC tablet Take 81 mg by mouth daily. Gabapentin, Once-Daily, 300 MG TABS Take 300 mg by mouth 2 times daily as needed (leg pain). Qty: 30 tablet, Refills: 1      losartan-hydrochlorothiazide (HYZAAR) 100-25 MG per tablet Take 1 tablet by mouth daily  Qty: 30 tablet, Refills: 5      warfarin (COUMADIN) 5 MG tablet Take 2 tablets by mouth daily Take two tablets daily  Qty: 60 tablet, Refills: 3      !! rOPINIRole (REQUIP) 0.5 MG tablet Take 1 tablet by mouth nightly as needed (leg cramps)  Qty: 30 tablet, Refills: 3      clobetasol (TEMOVATE) 0.05 % cream Apply topically 2 times daily.   Qty: 1 Tube, Refills: 1      ondansetron (ZOFRAN) 4 MG tablet Take 1 tablet by mouth every 8 hours as needed for Nausea or Vomiting  Qty: 30 tablet, Refills: 1      pantoprazole (PROTONIX) 40 MG tablet Take 1 tablet by mouth daily  Qty: 60 tablet, Refills: 2    Comments: Please consider 90 day supplies to promote better adherence      Ergocalciferol (VITAMIN D2 PO) Take 50,000 Units by mouth      sucralfate (CARAFATE) 1 GM/10ML suspension Take 1 g by mouth 4 Vitals [02/28/18 1539]   BP Temp Temp Source Pulse Resp SpO2 Height Weight   (!) 80/55 97.5 °F (36.4 °C) Oral 54 12 95 % 5' 7\" (1.702 m) 222 lb (100.7 kg)       Physical Exam   Constitutional: She is oriented to person, place, and time. She appears well-developed and well-nourished. No distress. HENT:   Head: Normocephalic. Eyes: Conjunctivae and EOM are normal. Pupils are equal, round, and reactive to light. Neck: Normal range of motion. Neck supple. No tracheal deviation present. No thyromegaly present. Cardiovascular: Normal rate, normal heart sounds and intact distal pulses. Pulmonary/Chest: Effort normal and breath sounds normal.   Abdominal: Soft. Bowel sounds are normal. She exhibits no distension and no mass. There is no tenderness. There is no rebound and no guarding. Musculoskeletal: Normal range of motion. She exhibits no edema or tenderness. Neurological: She is alert and oriented to person, place, and time. No cranial nerve deficit. Skin: Skin is warm and dry. No rash noted. She is not diaphoretic. No erythema. No pallor. Psychiatric: She has a normal mood and affect. Her behavior is normal.       DIAGNOSTIC RESULTS     EKG: All EKG's are interpreted by the Emergency Department Physician who either signs or Co-signs this chart in the absence of a cardiologist.    EKG at 3:46 PM shows an AV dual paced complexes that are alternating from atrial to ventricular source of pacing QRS complexes associated with atrial paced beats appear normal ST segments shows some repolarization issues precordial lead and T waves are grossly normal    RADIOLOGY:   Non-plain film images such as CT, Ultrasound and MRI are read by the radiologist. Plain radiographic images are visualized and preliminarily interpreted by the emergency physician with the below findings:          XR CHEST PORTABLE   Final Result   Impression:   1. Stable cardiomegaly with left subclavian cardiac pacer device.  No   acute pulmonary injury ([facility] [address]    2. Lightheadedness    3. Syncope and collapse          DISPOSITION/PLAN   DISPOSITION Decision To Discharge 02/28/2018 06:47:53 PM      PATIENT REFERRED TO:  No follow-up provider specified.     DISCHARGE MEDICATIONS:  Current Discharge Medication List             (Please note that portions of this note were completed with a voice recognition program.  Efforts were made to edit the dictations but occasionally words are mis-transcribed.)    Ema Schuler MD (electronically signed)  Attending Emergency Physician         Ed Green MD  02/28/18 2592

## 2018-03-01 ENCOUNTER — HOSPITAL ENCOUNTER (EMERGENCY)
Age: 68
Discharge: HOME | End: 2018-03-01
Attending: EMERGENCY MEDICINE
Payer: MEDICARE

## 2018-03-01 ENCOUNTER — APPOINTMENT (OUTPATIENT)
Dept: GENERAL RADIOLOGY | Age: 68
End: 2018-03-01
Payer: MEDICARE

## 2018-03-01 ENCOUNTER — TELEPHONE (OUTPATIENT)
Dept: INTERNAL MEDICINE | Age: 68
End: 2018-03-01

## 2018-03-01 VITALS
HEART RATE: 61 BPM | WEIGHT: 222 LBS | RESPIRATION RATE: 14 BRPM | TEMPERATURE: 98 F | HEIGHT: 67 IN | SYSTOLIC BLOOD PRESSURE: 139 MMHG | DIASTOLIC BLOOD PRESSURE: 79 MMHG | BODY MASS INDEX: 34.84 KG/M2 | OXYGEN SATURATION: 95 %

## 2018-03-01 DIAGNOSIS — R55 SYNCOPE AND COLLAPSE: Primary | ICD-10-CM

## 2018-03-01 DIAGNOSIS — I95.2 HYPOTENSION DUE TO DRUGS: ICD-10-CM

## 2018-03-01 LAB
ALBUMIN SERPL-MCNC: 4 G/DL (ref 3.5–5.2)
ALP BLD-CCNC: 83 U/L (ref 35–104)
ALT SERPL-CCNC: 13 U/L (ref 5–33)
ANION GAP SERPL CALCULATED.3IONS-SCNC: 11 MMOL/L (ref 7–19)
AST SERPL-CCNC: 24 U/L (ref 5–32)
BASOPHILS ABSOLUTE: 0 K/UL (ref 0–0.2)
BASOPHILS RELATIVE PERCENT: 0.4 % (ref 0–1)
BILIRUB SERPL-MCNC: 0.3 MG/DL (ref 0.2–1.2)
BUN BLDV-MCNC: 51 MG/DL (ref 8–23)
CALCIUM SERPL-MCNC: 9.5 MG/DL (ref 8.8–10.2)
CHLORIDE BLD-SCNC: 97 MMOL/L (ref 98–111)
CO2: 30 MMOL/L (ref 22–29)
CREAT SERPL-MCNC: 2 MG/DL (ref 0.5–0.9)
EOSINOPHILS ABSOLUTE: 0 K/UL (ref 0–0.6)
EOSINOPHILS RELATIVE PERCENT: 0.6 % (ref 0–5)
GFR NON-AFRICAN AMERICAN: 25
GLUCOSE BLD-MCNC: 113 MG/DL (ref 74–109)
HCT VFR BLD CALC: 38.6 % (ref 37–47)
HEMOGLOBIN: 12 G/DL (ref 12–16)
LYMPHOCYTES ABSOLUTE: 1.8 K/UL (ref 1.1–4.5)
LYMPHOCYTES RELATIVE PERCENT: 24.7 % (ref 20–40)
MCH RBC QN AUTO: 28.4 PG (ref 27–31)
MCHC RBC AUTO-ENTMCNC: 31.1 G/DL (ref 33–37)
MCV RBC AUTO: 91.5 FL (ref 81–99)
MONOCYTES ABSOLUTE: 0.9 K/UL (ref 0–0.9)
MONOCYTES RELATIVE PERCENT: 12.7 % (ref 0–10)
NEUTROPHILS ABSOLUTE: 4.4 K/UL (ref 1.5–7.5)
NEUTROPHILS RELATIVE PERCENT: 61.2 % (ref 50–65)
PDW BLD-RTO: 15.7 % (ref 11.5–14.5)
PERFORMED ON: NORMAL
PLATELET # BLD: 218 K/UL (ref 130–400)
PMV BLD AUTO: 11.6 FL (ref 9.4–12.3)
POC TROPONIN I: 0.01 NG/ML (ref 0–0.08)
POTASSIUM SERPL-SCNC: 3.8 MMOL/L (ref 3.5–5)
RBC # BLD: 4.22 M/UL (ref 4.2–5.4)
SODIUM BLD-SCNC: 138 MMOL/L (ref 136–145)
TOTAL PROTEIN: 8.2 G/DL (ref 6.6–8.7)
WBC # BLD: 7.2 K/UL (ref 4.8–10.8)

## 2018-03-01 PROCEDURE — 93005 ELECTROCARDIOGRAM TRACING: CPT

## 2018-03-01 PROCEDURE — 99284 EMERGENCY DEPT VISIT MOD MDM: CPT

## 2018-03-01 PROCEDURE — 85025 COMPLETE CBC W/AUTO DIFF WBC: CPT

## 2018-03-01 PROCEDURE — 84484 ASSAY OF TROPONIN QUANT: CPT

## 2018-03-01 PROCEDURE — 99284 EMERGENCY DEPT VISIT MOD MDM: CPT | Performed by: EMERGENCY MEDICINE

## 2018-03-01 PROCEDURE — 80053 COMPREHEN METABOLIC PANEL: CPT

## 2018-03-01 PROCEDURE — 36415 COLL VENOUS BLD VENIPUNCTURE: CPT

## 2018-03-01 RX ORDER — 0.9 % SODIUM CHLORIDE 0.9 %
500 INTRAVENOUS SOLUTION INTRAVENOUS ONCE
Status: DISCONTINUED | OUTPATIENT
Start: 2018-03-01 | End: 2018-03-01 | Stop reason: HOSPADM

## 2018-03-01 ASSESSMENT — ENCOUNTER SYMPTOMS
VISUAL CHANGE: 0
SHORTNESS OF BREATH: 1
NAUSEA: 0
VOMITING: 0

## 2018-03-01 NOTE — TELEPHONE ENCOUNTER
I called Taylor back to inform.  I had to leave a  msg.  I also sent a letter.  Rafat Anthony, CMA

## 2018-03-01 NOTE — TELEPHONE ENCOUNTER
She is low risk for surgery but her coumadin is managed by hematology for a hypercoaguable state and they need clearance from this also

## 2018-03-01 NOTE — TELEPHONE ENCOUNTER
Patient called in and left a vm stating she was in the ER  I called her back, because I thought she wanted to come in and check on her medications as it seemed that was why she left us the voicemail. She advised while on the phone that she did not want to come in, as she has an appointment on 56/2 with norene ascencio, she just wanted to let us know she was there.   I advised understanding and thanked her for the call

## 2018-03-01 NOTE — ED PROVIDER NOTES
[facility]  eMERGENCY dEPARTMENT eNCOUnter      Pt Name: Marylen Carl  MRN: [id number]  [place] 1950  Date of evaluation: 3/1/2018  Provider: Scott Christopher MD    CHIEF COMPLAINT       Chief Complaint   Patient presents with    Loss of Consciousness     was seen yest for same         HISTORY OF PRESENT ILLNESS   (Location/Symptom, Timing/Onset, Context/Setting, Quality, Duration, Modifying Factors, Severity)  Note limiting factors. Marylen Carl is a 75 y.o. female who presents to the emergency department      The history is provided by the patient. Loss of Consciousness   Episode history:  Multiple  Most recent episode: Today (one yesterday, one today, both sitting, no prodrome except dyspnea)  Chronicity:  New  Context: sitting down    Witnessed: no (but passers by woke her)    Relieved by:  None tried  Worsened by:  Nothing  Ineffective treatments:  None tried  Associated symptoms: confusion and shortness of breath    Associated symptoms: no chest pain, no diaphoresis, no headaches, no malaise/fatigue, no nausea, no palpitations, no visual change and no vomiting    Risk factors comment:  Pacer      Nursing Notes were reviewed. REVIEW OF SYSTEMS    (2-9 systems for level 4, 10 or more for level 5)     Review of Systems   Constitutional: Negative for diaphoresis and malaise/fatigue. Respiratory: Positive for shortness of breath. Cardiovascular: Positive for syncope. Negative for chest pain and palpitations. Gastrointestinal: Negative for nausea and vomiting. Neurological: Negative for headaches. Psychiatric/Behavioral: Positive for confusion. All other systems reviewed and are negative. Except as noted above the remainder of the review of systems was reviewed and negative.        PAST MEDICAL HISTORY     Past Medical History:   Diagnosis Date    Abnormal EKG     Acute sinusitis     Allergic reaction to spider bite     Anemia     Anticoagulated     Anxiety     Arrhythmia

## 2018-03-02 ENCOUNTER — TELEPHONE (OUTPATIENT)
Dept: INTERNAL MEDICINE | Age: 68
End: 2018-03-02

## 2018-03-02 ENCOUNTER — APPOINTMENT (OUTPATIENT)
Dept: PHYSICAL THERAPY | Age: 68
End: 2018-03-02
Payer: MEDICARE

## 2018-03-02 NOTE — ED NOTES
ASSESSMENT:     Pt alert & oriented x4. Pupils equal & reactive.     Skin: Warm, dry, & pink. Capillary refill less than 2 seconds.      Cardiac: S1 & S2 noted.     Lungs: Clear upper and lower lobes, respirations even & unlabored. Abdomen: Bowel sounds noted upper and lower quadrants. Soft and nontender.     Extremities: Bilateral pedal pulses & no edema noted.     No distress noted. Side rails up and call light in reach.     Pt c/o passing out at work. Same as yesterday. Felt weak and laid head down on desk.  Feels like she needs her meds changed        Andrei Canales RN  03/01/18 9207

## 2018-03-06 ENCOUNTER — OFFICE VISIT (OUTPATIENT)
Dept: INTERNAL MEDICINE | Age: 68
End: 2018-03-06
Payer: MEDICARE

## 2018-03-06 ENCOUNTER — ANTI-COAG VISIT (OUTPATIENT)
Dept: INTERNAL MEDICINE | Age: 68
End: 2018-03-06

## 2018-03-06 ENCOUNTER — NURSE ONLY (OUTPATIENT)
Dept: INTERNAL MEDICINE | Age: 68
End: 2018-03-06
Payer: MEDICARE

## 2018-03-06 VITALS
BODY MASS INDEX: 37.04 KG/M2 | DIASTOLIC BLOOD PRESSURE: 94 MMHG | TEMPERATURE: 97.5 F | HEIGHT: 67 IN | WEIGHT: 236 LBS | HEART RATE: 52 BPM | OXYGEN SATURATION: 99 % | SYSTOLIC BLOOD PRESSURE: 160 MMHG

## 2018-03-06 DIAGNOSIS — R63.4 WEIGHT LOSS: ICD-10-CM

## 2018-03-06 DIAGNOSIS — Z79.01 ENCOUNTER FOR CURRENT LONG-TERM USE OF ANTICOAGULANTS: Primary | ICD-10-CM

## 2018-03-06 DIAGNOSIS — I95.2 DRUG-INDUCED HYPOTENSION: Primary | ICD-10-CM

## 2018-03-06 LAB
INTERNATIONAL NORMALIZATION RATIO, POC: 2
PROTHROMBIN TIME, POC: NORMAL

## 2018-03-06 PROCEDURE — 1036F TOBACCO NON-USER: CPT | Performed by: NURSE PRACTITIONER

## 2018-03-06 PROCEDURE — G8484 FLU IMMUNIZE NO ADMIN: HCPCS | Performed by: NURSE PRACTITIONER

## 2018-03-06 PROCEDURE — 85610 PROTHROMBIN TIME: CPT | Performed by: INTERNAL MEDICINE

## 2018-03-06 PROCEDURE — 3014F SCREEN MAMMO DOC REV: CPT | Performed by: NURSE PRACTITIONER

## 2018-03-06 PROCEDURE — 99214 OFFICE O/P EST MOD 30 MIN: CPT | Performed by: NURSE PRACTITIONER

## 2018-03-06 PROCEDURE — G8417 CALC BMI ABV UP PARAM F/U: HCPCS | Performed by: NURSE PRACTITIONER

## 2018-03-06 PROCEDURE — G8598 ASA/ANTIPLAT THER USED: HCPCS | Performed by: NURSE PRACTITIONER

## 2018-03-06 PROCEDURE — 1090F PRES/ABSN URINE INCON ASSESS: CPT | Performed by: NURSE PRACTITIONER

## 2018-03-06 PROCEDURE — 1123F ACP DISCUSS/DSCN MKR DOCD: CPT | Performed by: NURSE PRACTITIONER

## 2018-03-06 PROCEDURE — G8400 PT W/DXA NO RESULTS DOC: HCPCS | Performed by: NURSE PRACTITIONER

## 2018-03-06 PROCEDURE — 3017F COLORECTAL CA SCREEN DOC REV: CPT | Performed by: NURSE PRACTITIONER

## 2018-03-06 PROCEDURE — 4040F PNEUMOC VAC/ADMIN/RCVD: CPT | Performed by: NURSE PRACTITIONER

## 2018-03-06 PROCEDURE — G8427 DOCREV CUR MEDS BY ELIG CLIN: HCPCS | Performed by: NURSE PRACTITIONER

## 2018-03-06 RX ORDER — ROPINIROLE 1 MG/1
1 TABLET, FILM COATED ORAL 3 TIMES DAILY
COMMUNITY
End: 2018-08-07 | Stop reason: SDUPTHER

## 2018-03-06 ASSESSMENT — ENCOUNTER SYMPTOMS
NAUSEA: 0
RHINORRHEA: 0
VOICE CHANGE: 0
SHORTNESS OF BREATH: 0
COUGH: 0
COLOR CHANGE: 0
VOMITING: 0
PHOTOPHOBIA: 0
BACK PAIN: 0

## 2018-03-06 NOTE — PROGRESS NOTES
extended release tablet Take 1 tablet by mouth daily 30 tablet 3    fluticasone-salmeterol (ADVAIR DISKUS) 250-50 MCG/DOSE AEPB Inhale 1 puff into the lungs every 12 hours.  aspirin 81 MG EC tablet Take 81 mg by mouth daily.  Multiple Vitamin (MULTIVITAMIN PO) Take 1 tablet by mouth daily        No current facility-administered medications for this visit. Allergies   Allergen Reactions    Insect Extract Allergy Skin Test Anaphylaxis     Bee stings    Lortab [Hydrocodone-Acetaminophen] Nausea And Vomiting     Sweats, weak, nausea and vomiting    Other Nausea And Vomiting     Opiates------sweating, weak        Oxycodone-Acetaminophen Nausea And Vomiting     Sweating and vomiting     Ultram [Tramadol Hcl] Nausea And Vomiting     Sweating, weak, nausea and vomiting         Health Maintenance   Topic Date Due    Diabetic foot exam  06/29/1960    Diabetic retinal exam  06/29/1959    Shingles Vaccine (1 of 2 - 2 Dose Series) 06/29/1999    Breast cancer screen  09/05/2016    A1C test (Diabetic or Prediabetic)  01/31/2019    Diabetic microalbuminuria test  01/31/2019    Lipid screen  01/31/2019    Pneumococcal low/med risk (2 of 2 - PPSV23) 01/31/2019    Colon cancer screen colonoscopy  04/01/2021    DTaP/Tdap/Td vaccine (2 - Td) 03/07/2027    DEXA (modify frequency per FRAX score)  Addressed    Flu vaccine  Completed    Hepatitis C screen  Completed       Subjective:      Review of Systems   Constitutional: Negative for chills and fever. HENT: Negative for ear pain, hearing loss, rhinorrhea and voice change. Eyes: Negative for photophobia and visual disturbance. Respiratory: Negative for cough and shortness of breath. Cardiovascular: Negative for chest pain and palpitations. Gastrointestinal: Negative for nausea and vomiting. Endocrine: Negative. Negative for cold intolerance and heat intolerance. Genitourinary: Negative for difficulty urinating and flank pain.

## 2018-03-06 NOTE — PROGRESS NOTES
menstrual periods   · Diarrhea, vomiting, or inability to eat for more than 24 hours   · Fever (temperature greater than 100.4º F or 38º C)    It is important to remember that warfarin is taken to reduce the risk of a clotting condition(s), such as a deep vein thrombosis or pulmonary embolism. If one or more of these symptoms develops, the patient should seek immediate medical attention. OTHER RECOMMENDATIONS  Take warfarin on a schedule -- Warfarin should be taken exactly as directed. Do not increase, decrease, or change the dose unless told to do so by a healthcare provider. If a dose is missed or forgotten, call the prescribing clinician for advice. Warfarin tablets come in different strengths; each is usually a different color, with the amount of warfarin (in milligrams) clearly printed on the tablet. If the color or dose of the tablet appears different than those taken previously, the patient should immediately notify their pharmacist or healthcare provider. Reduce the risk of bleeding -- There is a tendency to bleed more easily than usual while taking warfarin. Some simple changes can decrease this risk:  · Use a soft bristle toothbrush   · Floss with waxed floss rather than unwaxed floss   · Shave with an electric razor rather than a blade   · Take care when using sharp objects, such as knives and scissors   · Avoid activities that have a risk of falling or injury (eg, contact sports)  Prevent falls -- Falling may significantly increase the risk of bleeding. Taking measures to prevent falls is recommended, and could include the following:  · Remove loose rugs and electrical cords or any other loose items in the home that could lead to tripping, slipping, and falling. · Ensure that there is adequate lighting in all areas inside and around the home, including stairwells and entrance ways. · Avoid walking on ice, wet or polished floors, or other potentially slippery surfaces.    · Avoid walking on alcohol can increase the risk of injury, and therefore bleeding. Warfarin and medications -- A number of medications, herbs, and vitamins can interact with warfarin (table 2 and table 3). This interaction may affect the action of warfarin or the other medication. If warfarin is affected, the dose may need to be adjusted (up or down) to maintain an optimal coagulation effect. Patients who take warfarin should consult with their clinician before taking any new medication, including over-the-counter (non-prescription) drugs, herbal medicines, vitamins, or any other products. Some of the most common over-the-counter pain relievers, including acetaminophen (Tylenol®), aspirin, and nonsteroidal antiinflammatory drugs (such as ibuprofen [Advil®]) and naproxen (Aleve®), enhance the anticoagulant effects of warfarin. Vitamin E may increase the anticoagulant effects of warfarin. Consult a healthcare provider before adding or changing a dose of vitamin E or any other vitamin. Wear medical identification -- People who require long-term warfarin should wear a bracelet, necklace, or similar alert tag at all times. If an accident occurs and the person is too ill to explain their condition, this will help responders provide appropriate care. The alert tag should include a list of major medical conditions and the reason warfarin is needed (eg, atrial fibrillation), as well as the name and phone number of an emergency contact. One device, Medic Alert®, provides a toll-free number that emergency medical workers can call to find out a person's medical history, list of medications, family emergency contact numbers, and healthcare provider names and numbers.     GRAPHICS: Table 1  Foods with moderate to high levels of vitamin K  Food name Serving size Vitamin K (micrograms)   High level vitamin K foods   Kale, frozen (cooked or boiled, drained) 1/2 cup 570   Kale, fresh, (cooked or boiled, drained) 1/2 cup 530   Spinach, frozen (cooked or boiled, drained) 1/2 cup 514   Spinach, raw 1 cup 150   Bernabe greens, frozen (cooked, drained) 1/2 cup 530   Turnip greens, frozen (cooked, drained) 1/2 cup 425   [place] sprouts, frozen (cooked, drained) 1/2 cup 110   Moderate level vitamin K foods   Asparagus, frozen (cooked, drained) 1/2 cup  4 gauthier 72  48   Asparagus, fresh (cooked, drained) 4 gauthier 30   Broccoli, frozen (cooked, drained) 1/2 cup 60   Broccoli, fresh (cooked, drained) 1 spear 52   Broccoli, raw 1/2 cup 40   Lettuce (butterhead, [place], rebecca) 1/2 head 80   Lettuce (iceberg, crisphead) 1/2 head 65   Lettuce (cale, cos) 1 cup 57   Lettuce (green leaf) 1 cup 97   Okra, fresh (cooked, drained) 1/2 cup 32   Okra, frozen (cooked, drained) 1/2 cup 44   Cabbage (cooked, drained) 1/2 cup 73   Cabbage, raw 1/2 cup 21   Cabbage, ramses (raw) 1/2 cup 24   Cabbage, Chinese (cooked, drained) 1/2 cup 28   Coleslaw (fast food-type) 3/4 cup 56   Sauerkraut, canned 1/2 cup 41   Peas, frozen, with pod (cooked, drained) 1/2 cup 24   Peas, fresh, with pod (cooked, drained) 1/2 cup 20   Peas, green, frozen (cooked, drained) 1/2 cup 18   Celery, raw 1/2 cup 17   Beans, green or yellow, fresh (cooked, drained) 1/2 cup 10   Oil, canola 1 tablespoon 17   Oil, olive 1 tablespoon 8   Oil, other (including peanut, sesame, safflower, corn, sunflower, soybean) 1 tablespoon 3 or less   Green tea, brewed in hot water 3.5 ounces 0.3   Data from: [employer]. USDA Nutrient Database for Standard Reference. Nutrient Data Laboratory Home Page, [employer].

## 2018-03-07 ENCOUNTER — HOSPITAL ENCOUNTER (OUTPATIENT)
Dept: PHYSICAL THERAPY | Age: 68
Setting detail: THERAPIES SERIES
Discharge: HOME | End: 2018-03-07
Payer: MEDICARE

## 2018-03-07 PROCEDURE — 97110 THERAPEUTIC EXERCISES: CPT

## 2018-03-07 ASSESSMENT — PAIN DESCRIPTION - ORIENTATION: ORIENTATION: RIGHT;MID

## 2018-03-07 ASSESSMENT — PAIN SCALES - GENERAL: PAINLEVEL_OUTOF10: 5

## 2018-03-07 ASSESSMENT — PAIN DESCRIPTION - LOCATION: LOCATION: BACK;KNEE

## 2018-03-07 ASSESSMENT — PAIN DESCRIPTION - PAIN TYPE: TYPE: CHRONIC PAIN

## 2018-03-09 ENCOUNTER — HOSPITAL ENCOUNTER (OUTPATIENT)
Dept: PHYSICAL THERAPY | Age: 68
Setting detail: THERAPIES SERIES
Discharge: HOME | End: 2018-03-09
Payer: MEDICARE

## 2018-03-09 ENCOUNTER — HOSPITAL ENCOUNTER (OUTPATIENT)
Dept: GENERAL RADIOLOGY | Age: 68
Discharge: HOME | End: 2018-03-09
Payer: MEDICARE

## 2018-03-09 ENCOUNTER — HOSPITAL ENCOUNTER (OUTPATIENT)
Dept: PREADMISSION TESTING | Age: 68
Discharge: HOME | End: 2018-03-13
Payer: MEDICARE

## 2018-03-09 VITALS — WEIGHT: 236 LBS | HEIGHT: 67 IN | BODY MASS INDEX: 37.04 KG/M2

## 2018-03-09 LAB
ALBUMIN SERPL-MCNC: 4.1 G/DL (ref 3.5–5.2)
ALP BLD-CCNC: 84 U/L (ref 35–104)
ALT SERPL-CCNC: 10 U/L (ref 5–33)
ANION GAP SERPL CALCULATED.3IONS-SCNC: 14 MMOL/L (ref 7–19)
APTT: 24 SEC (ref 26–36.2)
AST SERPL-CCNC: 25 U/L (ref 5–32)
BACTERIA: ABNORMAL /HPF
BASOPHILS ABSOLUTE: 0 K/UL (ref 0–0.2)
BASOPHILS RELATIVE PERCENT: 0.3 % (ref 0–1)
BILIRUB SERPL-MCNC: 0.5 MG/DL (ref 0.2–1.2)
BILIRUBIN URINE: ABNORMAL
BLOOD, URINE: NEGATIVE
BUN BLDV-MCNC: 14 MG/DL (ref 8–23)
CALCIUM SERPL-MCNC: 9.3 MG/DL (ref 8.8–10.2)
CHLORIDE BLD-SCNC: 103 MMOL/L (ref 98–111)
CLARITY: ABNORMAL
CO2: 23 MMOL/L (ref 22–29)
COLOR: ABNORMAL
CREAT SERPL-MCNC: 0.8 MG/DL (ref 0.5–0.9)
EKG P AXIS: 0 DEGREES
EKG P AXIS: 6 DEGREES
EKG P-R INTERVAL: 162 MS
EKG P-R INTERVAL: 174 MS
EKG Q-T INTERVAL: 412 MS
EKG Q-T INTERVAL: 440 MS
EKG QRS DURATION: 114 MS
EKG QRS DURATION: 114 MS
EKG QTC CALCULATION (BAZETT): 436 MS
EKG QTC CALCULATION (BAZETT): 445 MS
EKG T AXIS: 33 DEGREES
EKG T AXIS: 45 DEGREES
EOSINOPHILS ABSOLUTE: 0.1 K/UL (ref 0–0.6)
EOSINOPHILS RELATIVE PERCENT: 1.4 % (ref 0–5)
EPITHELIAL CELLS, UA: 12 /HPF (ref 0–5)
GFR NON-AFRICAN AMERICAN: >60
GLUCOSE BLD-MCNC: 80 MG/DL (ref 74–109)
GLUCOSE URINE: NEGATIVE MG/DL
HBA1C MFR BLD: 5.8 %
HCT VFR BLD CALC: 38.6 % (ref 37–47)
HEMOGLOBIN: 12.1 G/DL (ref 12–16)
HYALINE CASTS: 6 /HPF (ref 0–8)
INR BLD: 1.51 (ref 0.88–1.18)
KETONES, URINE: NEGATIVE MG/DL
LEUKOCYTE ESTERASE, URINE: ABNORMAL
LYMPHOCYTES ABSOLUTE: 1.7 K/UL (ref 1.1–4.5)
LYMPHOCYTES RELATIVE PERCENT: 22.8 % (ref 20–40)
MCH RBC QN AUTO: 28.5 PG (ref 27–31)
MCHC RBC AUTO-ENTMCNC: 31.3 G/DL (ref 33–37)
MCV RBC AUTO: 90.8 FL (ref 81–99)
MONOCYTES ABSOLUTE: 0.7 K/UL (ref 0–0.9)
MONOCYTES RELATIVE PERCENT: 9.1 % (ref 0–10)
NEUTROPHILS ABSOLUTE: 4.8 K/UL (ref 1.5–7.5)
NEUTROPHILS RELATIVE PERCENT: 66 % (ref 50–65)
NITRITE, URINE: NEGATIVE
PDW BLD-RTO: 15.7 % (ref 11.5–14.5)
PH UA: 6.5
PLATELET # BLD: 114 K/UL (ref 130–400)
PLATELET SLIDE REVIEW: ABNORMAL
PMV BLD AUTO: 12.3 FL (ref 9.4–12.3)
POTASSIUM SERPL-SCNC: 3.6 MMOL/L (ref 3.5–5)
PROTEIN UA: 30 MG/DL
PROTHROMBIN TIME: 18.2 SEC (ref 12–14.6)
RBC # BLD: 4.25 M/UL (ref 4.2–5.4)
RBC UA: 3 /HPF (ref 0–4)
SODIUM BLD-SCNC: 140 MMOL/L (ref 136–145)
SPECIFIC GRAVITY UA: 1.02
TOTAL PROTEIN: 8.1 G/DL (ref 6.6–8.7)
URINE REFLEX TO CULTURE: YES
UROBILINOGEN, URINE: 1 E.U./DL
WBC # BLD: 7.3 K/UL (ref 4.8–10.8)
WBC UA: 7 /HPF (ref 0–5)

## 2018-03-09 PROCEDURE — 87070 CULTURE OTHR SPECIMN AEROBIC: CPT

## 2018-03-09 PROCEDURE — 93005 ELECTROCARDIOGRAM TRACING: CPT

## 2018-03-09 PROCEDURE — 87086 URINE CULTURE/COLONY COUNT: CPT

## 2018-03-09 PROCEDURE — 87186 SC STD MICRODIL/AGAR DIL: CPT

## 2018-03-09 PROCEDURE — 81001 URINALYSIS AUTO W/SCOPE: CPT

## 2018-03-09 PROCEDURE — 85730 THROMBOPLASTIN TIME PARTIAL: CPT

## 2018-03-09 PROCEDURE — 85610 PROTHROMBIN TIME: CPT

## 2018-03-09 PROCEDURE — 80053 COMPREHEN METABOLIC PANEL: CPT

## 2018-03-09 PROCEDURE — 83036 HEMOGLOBIN GLYCOSYLATED A1C: CPT

## 2018-03-09 PROCEDURE — 71046 X-RAY EXAM CHEST 2 VIEWS: CPT

## 2018-03-09 PROCEDURE — 97110 THERAPEUTIC EXERCISES: CPT

## 2018-03-09 PROCEDURE — 85025 COMPLETE CBC W/AUTO DIFF WBC: CPT

## 2018-03-09 RX ORDER — BUMETANIDE 1 MG/1
1 TABLET ORAL DAILY
COMMUNITY
End: 2018-08-07 | Stop reason: SDUPTHER

## 2018-03-09 RX ORDER — OXYCODONE HCL 10 MG/1
10 TABLET, FILM COATED, EXTENDED RELEASE ORAL ONCE
Status: CANCELLED | OUTPATIENT
Start: 2018-03-26

## 2018-03-09 RX ORDER — PREGABALIN 75 MG/1
75 CAPSULE ORAL ONCE
Status: CANCELLED | OUTPATIENT
Start: 2018-03-26

## 2018-03-09 RX ORDER — DEXAMETHASONE SODIUM PHOSPHATE 10 MG/ML
10 INJECTION, SOLUTION INTRAMUSCULAR; INTRAVENOUS ONCE
Status: CANCELLED | OUTPATIENT
Start: 2018-03-26

## 2018-03-09 RX ORDER — M-VIT,TX,IRON,MINS/CALC/FOLIC 27MG-0.4MG
1 TABLET ORAL DAILY
Status: ON HOLD | COMMUNITY
End: 2018-07-25 | Stop reason: SDUPTHER

## 2018-03-09 RX ORDER — CELECOXIB 200 MG/1
200 CAPSULE ORAL ONCE
Status: CANCELLED | OUTPATIENT
Start: 2018-03-26

## 2018-03-09 ASSESSMENT — PAIN DESCRIPTION - FREQUENCY: FREQUENCY: CONTINUOUS

## 2018-03-09 ASSESSMENT — PAIN SCALES - GENERAL: PAINLEVEL_OUTOF10: 8

## 2018-03-09 ASSESSMENT — PAIN DESCRIPTION - DESCRIPTORS: DESCRIPTORS: ACHING;SORE;TINGLING

## 2018-03-09 ASSESSMENT — PAIN DESCRIPTION - LOCATION: LOCATION: BACK;KNEE

## 2018-03-09 ASSESSMENT — PAIN DESCRIPTION - PAIN TYPE: TYPE: CHRONIC PAIN

## 2018-03-09 ASSESSMENT — PAIN DESCRIPTION - ORIENTATION: ORIENTATION: RIGHT;MID

## 2018-03-09 NOTE — PROGRESS NOTES
Physical Therapy  Daily Treatment Note  Date: 3/9/2018  Patient Name: Lima Murphy  MRN: [id number]     :   1950    Subjective:   General  Chart Reviewed: Yes  Additional Pertinent Hx: Ms. Tavares Artis presents to therapy with complaint of low back pain that began after a fall onto her back. Additional pertinent medical history includes cardiac pacemaker, dizziness, lupus, osteoarthritis, osteoporosis, DM2. Response To Previous Treatment: Not applicable  Family / Caregiver Present: No  Referring Practitioner: TWAN Simpson  PT Visit Information  Onset Date: 18  PT Insurance Information: Medicare, [place] (no precert required)  Total # of Visits Approved: 12  Total # of Visits to Date: 3  Plan of Care/Certification Expiration Date: 18  Progress Note Due Date: 18  Subjective  Subjective: My back really hurts and the knee is aggravated today. Pain level is up. I am not sure how much I can do. General Comment  Comments: Patient reports upcoming R TKA 3/26/2018  Pain Screening  Patient Currently in Pain: Yes  Pain Assessment  Pain Assessment: 0-10  Pain Level: 8 (6/10 post session)  Pain Type: Chronic pain  Pain Location: Back;Knee  Pain Orientation: Right;Mid  Pain Descriptors: Aching;Sore;Tingling  Pain Frequency: Continuous       Treatment Activities:               Exercises  Exercise 1: Seated forward bend lumbar stretch to R   x 10  Exercise 2: Seated lumbar rotation bilateral  x 10  Exercise 3: Seated sidebend to R  x 10  Exercise 4: Seated marching - x 10   Exercise 5: LAQ - x 10  Exercise 6: Hip abduction/adduction x 10 red  Exercise 7: Hamstring curls x 10 red  Exercise 8: Slouch with overcorrect x 10  Exercise 9: Multifidus row - x 10 bilateral with yellow t-band  Exercise 10: Mini squats x 5--DECLINED  Exercise 11:  Forward steps x 5--DECLINED  Exercise 12: Hip abduction standing x 5 bilateral--DECLINED  Exercise 13: LBE   - not today --DECLINED  Exercise 14: Ambulation  468' today Mobilization, Manual Therapy - Joint Manipulation, Pain Management, Home Exercise Program, Modalities  Timed Code Treatment Minutes: 23 Minutes (moist heat low back, ice pack right knee x 10 mins)     Therapy Time   Individual Concurrent Group Co-treatment   Time In 0847         Time Out 0921         Minutes 34         Timed Code Treatment Minutes: 23 Minutes (moist heat low back, ice pack right knee x 10 mins)       Lizbeth Hernandez PTA     Electronically signed by Lizbeth Hernandez PTA on 3/9/2018 at 9:27 AM

## 2018-03-10 LAB — MRSA CULTURE ONLY: NORMAL

## 2018-03-11 LAB
ORGANISM: ABNORMAL
URINE CULTURE, ROUTINE: ABNORMAL
URINE CULTURE, ROUTINE: ABNORMAL

## 2018-03-14 ENCOUNTER — HOSPITAL ENCOUNTER (OUTPATIENT)
Dept: PHYSICAL THERAPY | Age: 68
Setting detail: THERAPIES SERIES
Discharge: HOME | End: 2018-03-14
Payer: MEDICARE

## 2018-03-14 DIAGNOSIS — Z12.31 SCREENING MAMMOGRAM, ENCOUNTER FOR: ICD-10-CM

## 2018-03-14 PROCEDURE — 97110 THERAPEUTIC EXERCISES: CPT

## 2018-03-14 ASSESSMENT — PAIN SCALES - GENERAL: PAINLEVEL_OUTOF10: 3

## 2018-03-14 ASSESSMENT — PAIN DESCRIPTION - PAIN TYPE: TYPE: CHRONIC PAIN

## 2018-03-14 ASSESSMENT — PAIN DESCRIPTION - ORIENTATION: ORIENTATION: RIGHT;MID

## 2018-03-14 ASSESSMENT — PAIN DESCRIPTION - FREQUENCY: FREQUENCY: CONTINUOUS

## 2018-03-14 ASSESSMENT — PAIN DESCRIPTION - LOCATION: LOCATION: BACK;KNEE

## 2018-03-14 NOTE — PROGRESS NOTES
10 bilateral--  Exercise 13: LBE   - not today --DECLINED  Exercise 14: Ambulation  920' today during 6 MWT   Exercise 19: moist heat low back and ice pack right knee x 10 mins at end of session  Exercise 20: NO estim d/t pacemaker                                   Assessment:   Conditions Requiring Skilled Therapeutic Intervention  Body structures, Functions, Activity limitations: Decreased functional mobility ; Decreased ADL status; Decreased ROM; Decreased strength;Decreased high-level IADLs;Decreased balance  Assessment: Patient complaining of less pain than previous session and was able to perform more reps than in past and able to walk for the full 6 minutes. Few rest breaks were needed during today's session. Treatment Diagnosis: Low back pain  Patient Education: Exercises      G-Code:     OutComes Score                                           Goals:  Short term goals  Time Frame for Short term goals: 3-4 weeks  Short term goal 1: Patient will be independent with HEP  Short term goal 2: Patient will decrease TTP L lower lumbar region to demonstrate improved muscular biomechanics surrounding lumbar spine  Short term goal 3: Patient will increase lumbar flexion ROM to 0-60 to demonstrate improved functional mobility as needed for ADLS  Long term goals  Time Frame for Long term goals : 4-6 weeks  Long term goal 1: Patient will improve score on Oswestry to 20% impairment or less to demonstrate improved quality of life  Long term goal 2: Patient will increase strength in bilateral LEs to 4+/5 throughout as needed for transfers and ambulation  Long term goal 3: Patient will decrease average pain rating to 2/10 or better to demonstrate improved quality of life  Patient Goals   Patient goals :  To not have back pain    Plan:    Plan  Times per week: 2  Plan weeks: 4-6 weeks  Current Treatment Recommendations: Strengthening, ROM, Balance Training, Functional Mobility Training, Gait Training, Stair training, Manual Therapy - Soft Tissue Mobilization, Manual Therapy - Joint Manipulation, Pain Management, Home Exercise Program, Modalities  Timed Code Treatment Minutes: 39 Minutes (moist heat low back, ice pack right knee x 10 mins)     Therapy Time   Individual Concurrent Group Co-treatment   Time In 0750         Time Out 0829         Minutes 39         Timed Code Treatment Minutes: 39 Minutes (moist heat low back, ice pack right knee x 10 mins)    Electronically signed by Supriya Adams PT on 3/14/2018 at 8:36 AM    Supriya Adams PT

## 2018-03-15 ENCOUNTER — NURSE ONLY (OUTPATIENT)
Dept: INTERNAL MEDICINE | Age: 68
End: 2018-03-15
Payer: MEDICARE

## 2018-03-15 ENCOUNTER — ANTI-COAG VISIT (OUTPATIENT)
Dept: INTERNAL MEDICINE | Age: 68
End: 2018-03-15

## 2018-03-15 DIAGNOSIS — Z79.01 LONG TERM CURRENT USE OF ANTICOAGULANT: Primary | ICD-10-CM

## 2018-03-15 LAB
INTERNATIONAL NORMALIZATION RATIO, POC: 1.7
PROTHROMBIN TIME, POC: NORMAL

## 2018-03-15 PROCEDURE — 85610 PROTHROMBIN TIME: CPT | Performed by: INTERNAL MEDICINE

## 2018-03-15 NOTE — PROGRESS NOTES
Ms. Toñito Maldonado was here today. INR today: 1.7    INR Goal: 2.0-3.0    Dosing Plan  As of 3/15/2018    TTR:   0.0 % (1.6 wk)   Full instructions:   3/15: 15 mg; Otherwise 10 mg every day           Patient states she ate a large salad    PLAN: TAKE 15MG TONIGHT, THEN RESUME 10 MG DAILY    NEXT COUMADIN CLINIC APT IS: 3/21/18 at 7:45 am        [address] [place] [address] EFFECTS -- The major complication associated with warfarin is bleeding due to excessive anticoagulation. Excessive bleeding, or hemorrhage, can occur from any area of the body, and patients on warfarin should report any falls or accidents, as well as signs or symptoms of bleeding or unusual bruising. Signs of unusual bleeding include bleeding from the gums, blood in the urine, bloody or dark stool, a nosebleed, or vomiting blood. Because the risk of bleeding increases as the INR rises, the INR is closely monitored and adjustments are made as needed to maintain the INR within the target range. Gisele Harnett also cause skin necrosis or gangrene, which can cause dark red or black areas on the skin. This is a rare complication that may occur during the first several days of warfarin therapy. When to seek help -- If there are obvious or subtle signs of bleeding, including the following, patients should call their healthcare provider immediately.   · Persistent nausea, stomach upset, or vomiting blood or other material that looks like coffee grounds   · Headaches, dizziness, or weakness   · Nosebleeds   · Dark red or brown urine   · Blood in the bowel movement or dark-colored stool   · Pain, discomfort, or swelling, especially after an injury   · After a serious fall or head injury, even if there are no other symptoms  The patient should also call if any of the following occurs:  · Bleeding from the gums after brushing the teeth   · Swelling or pain at an injection site   · Excessive menstrual bleeding or bleeding between menstrual periods   · Diarrhea, vomiting, or inability to eat for more than 24 hours   · Fever (temperature greater than 100.4º F or 38º C)    It is important to remember that warfarin is taken to reduce the risk of a clotting condition(s), such as a deep vein thrombosis or pulmonary embolism. If one or more of these symptoms develops, the patient should seek immediate medical attention. OTHER RECOMMENDATIONS  Take warfarin on a schedule -- Warfarin should be taken exactly as directed. Do not increase, decrease, or change the dose unless told to do so by a healthcare provider. If a dose is missed or forgotten, call the prescribing clinician for advice. Warfarin tablets come in different strengths; each is usually a different color, with the amount of warfarin (in milligrams) clearly printed on the tablet. If the color or dose of the tablet appears different than those taken previously, the patient should immediately notify their pharmacist or healthcare provider. Reduce the risk of bleeding -- There is a tendency to bleed more easily than usual while taking warfarin. Some simple changes can decrease this risk:  · Use a soft bristle toothbrush   · Floss with waxed floss rather than unwaxed floss   · Shave with an electric razor rather than a blade   · Take care when using sharp objects, such as knives and scissors   · Avoid activities that have a risk of falling or injury (eg, contact sports)  Prevent falls -- Falling may significantly increase the risk of bleeding. Taking measures to prevent falls is recommended, and could include the following:  · Remove loose rugs and electrical cords or any other loose items in the home that could lead to tripping, slipping, and falling. · Ensure that there is adequate lighting in all areas inside and around the home, including stairwells and entrance ways. · Avoid walking on ice, wet or polished floors, or other potentially slippery surfaces. excessive amounts of alcohol can increase the risk of injury, and therefore bleeding. Warfarin and medications -- A number of medications, herbs, and vitamins can interact with warfarin (table 2 and table 3). This interaction may affect the action of warfarin or the other medication. If warfarin is affected, the dose may need to be adjusted (up or down) to maintain an optimal coagulation effect. Patients who take warfarin should consult with their clinician before taking any new medication, including over-the-counter (non-prescription) drugs, herbal medicines, vitamins, or any other products. Some of the most common over-the-counter pain relievers, including acetaminophen (Tylenol®), aspirin, and nonsteroidal antiinflammatory drugs (such as ibuprofen [Advil®]) and naproxen (Aleve®), enhance the anticoagulant effects of warfarin. Vitamin E may increase the anticoagulant effects of warfarin. Consult a healthcare provider before adding or changing a dose of vitamin E or any other vitamin. Wear medical identification -- People who require long-term warfarin should wear a bracelet, necklace, or similar alert tag at all times. If an accident occurs and the person is too ill to explain their condition, this will help responders provide appropriate care. The alert tag should include a list of major medical conditions and the reason warfarin is needed (eg, atrial fibrillation), as well as the name and phone number of an emergency contact. One device, Medic Alert®, provides a toll-free number that emergency medical workers can call to find out a person's medical history, list of medications, family emergency contact numbers, and healthcare provider names and numbers.     GRAPHICS: Table 1  Foods with moderate to high levels of vitamin K  Food name Serving size Vitamin K (micrograms)   High level vitamin K foods   Kale, frozen (cooked or boiled, drained) 1/2 cup 570   Kale, fresh, (cooked or boiled, drained) 1/2 cup 530   Spinach, frozen (cooked or boiled, drained) 1/2 cup 514   Spinach, raw 1 cup 150   Bernabe greens, frozen (cooked, drained) 1/2 cup 530   Turnip greens, frozen (cooked, drained) 1/2 cup 425   Duncan sprouts, frozen (cooked, drained) 1/2 cup 110   Moderate level vitamin K foods   Asparagus, frozen (cooked, drained) 1/2 cup  4 gauthier 72  48   Asparagus, fresh (cooked, drained) 4 gauthier 30   Broccoli, frozen (cooked, drained) 1/2 cup 60   Broccoli, fresh (cooked, drained) 1 spear 52   Broccoli, raw 1/2 cup 40   Lettuce (butterhead, Pearcy, rebecca) 1/2 head 80   Lettuce (iceberg, crisphead) 1/2 head 65   Lettuce (cale, cos) 1 cup 57   Lettuce (green leaf) 1 cup 97   Okra, fresh (cooked, drained) 1/2 cup 32   Okra, frozen (cooked, drained) 1/2 cup 44   Cabbage (cooked, drained) 1/2 cup 73   Cabbage, raw 1/2 cup 21   Cabbage, ramses (raw) 1/2 cup 24   Cabbage, Chinese (cooked, drained) 1/2 cup 28   Coleslaw (fast food-type) 3/4 cup 56   Sauerkraut, canned 1/2 cup 41   Peas, frozen, with pod (cooked, drained) 1/2 cup 24   Peas, fresh, with pod (cooked, drained) 1/2 cup 20   Peas, green, frozen (cooked, drained) 1/2 cup 18   Celery, raw 1/2 cup 17   Beans, green or yellow, fresh (cooked, drained) 1/2 cup 10   Oil, canola 1 tablespoon 17   Oil, olive 1 tablespoon 8   Oil, other (including peanut, sesame, safflower, corn, sunflower, soybean) 1 tablespoon 3 or less   Green tea, brewed in hot water 3.5 ounces 0.3   Data from: [employer]. USDA Nutrient Database for Standard Reference. Nutrient Data Laboratory Home Page, [email or web address]ricky Santos

## 2018-03-16 ENCOUNTER — HOSPITAL ENCOUNTER (OUTPATIENT)
Dept: PHYSICAL THERAPY | Age: 68
Setting detail: THERAPIES SERIES
Discharge: HOME | End: 2018-03-16
Payer: MEDICARE

## 2018-03-16 PROCEDURE — 97110 THERAPEUTIC EXERCISES: CPT

## 2018-03-16 ASSESSMENT — PAIN SCALES - GENERAL: PAINLEVEL_OUTOF10: 5

## 2018-03-16 ASSESSMENT — PAIN DESCRIPTION - LOCATION: LOCATION: BACK;KNEE

## 2018-03-16 ASSESSMENT — PAIN DESCRIPTION - PAIN TYPE: TYPE: CHRONIC PAIN

## 2018-03-16 ASSESSMENT — PAIN DESCRIPTION - ORIENTATION: ORIENTATION: RIGHT;MID

## 2018-03-16 NOTE — PROGRESS NOTES
Physical Therapy  Daily Treatment Note  Date: 3/16/2018  Patient Name: Toñito Maldonado  MRN: [id number]     :   1950    Subjective:   General  Chart Reviewed: Yes  Additional Pertinent Hx: Ms. Nnamdi Patterson presents to therapy with complaint of low back pain that began after a fall onto her back. Additional pertinent medical history includes cardiac pacemaker, dizziness, lupus, osteoarthritis, osteoporosis, DM2. Response To Previous Treatment: Not applicable  Family / Caregiver Present: No  Referring Practitioner: TWAN Dodd  PT Visit Information  Onset Date: 18  PT Insurance Information: Medicare, [place] (no precert required)  Total # of Visits Approved: 12  Total # of Visits to Date: 5  Plan of Care/Certification Expiration Date: 18  Progress Note Due Date: 18  Subjective  Subjective: Patient states she is sore today in her back and knee because she had to walk around her block trying to find her dog yesterday. General Comment  Comments: Patient reports upcoming R TKA 3/26/2018  Pain Screening  Patient Currently in Pain: Yes  Pain Assessment  Pain Assessment: 0-10  Pain Level: 5  Pain Type: Chronic pain  Pain Location: Back;Knee  Pain Orientation: Right;Mid  Vital Signs  Patient Currently in Pain: Yes       Treatment Activities:                                      Exercises  Exercise 1: Seated forward bend lumbar stretch to R   x 10  Exercise 2: Seated lumbar rotation bilateral  x 10  Exercise 3: Seated sidebend to R  x 10  Exercise 4: Seated marching - x 10   Exercise 5: LAQ - x 10 bilaterally  Exercise 6: Hip abduction/adduction x 10 red  Exercise 7: Hamstring curls x 10 red  Exercise 8: Slouch with overcorrect x 15  Exercise 9: Multifidus row - x 10 bilateral with yellow t-band  Exercise 10: Mini squats x 15--   only able to complete 4 today  Exercise 11:  Forward steps x 10--  Exercise 12: Hip abduction standing x 10 bilateral--  Exercise 13: LBE   - not today --DECLINED  Exercise 14: Ambulation  200' today during 6 MWT   Exercise 18: HEP issued 3/16/18 with theraband  Exercise 19: moist heat low back and ice pack right knee x 10 mins at end of session  Exercise 20: NO estim d/t pacemaker                                   Assessment:   Conditions Requiring Skilled Therapeutic Intervention  Body structures, Functions, Activity limitations: Decreased functional mobility ; Decreased ADL status; Decreased ROM; Decreased strength;Decreased high-level IADLs;Decreased balance  Assessment: Patient states she is continuing to have a lot of right knee pain. R knee appeared to be slightly swollen today and patient required more frequent rest breaks to address this. Patient still able to complete all exercises asked however did fewer repetitions of squats due to knee pain. Treatment Diagnosis: Low back pain  Patient Education: Exercises      Goals:  Short term goals  Time Frame for Short term goals: 3-4 weeks  Short term goal 1: Patient will be independent with HEP  Short term goal 2: Patient will decrease TTP L lower lumbar region to demonstrate improved muscular biomechanics surrounding lumbar spine  Short term goal 3: Patient will increase lumbar flexion ROM to 0-60 to demonstrate improved functional mobility as needed for ADLS  Long term goals  Time Frame for Long term goals : 4-6 weeks  Long term goal 1: Patient will improve score on Oswestry to 20% impairment or less to demonstrate improved quality of life  Long term goal 2: Patient will increase strength in bilateral LEs to 4+/5 throughout as needed for transfers and ambulation  Long term goal 3: Patient will decrease average pain rating to 2/10 or better to demonstrate improved quality of life  Patient Goals   Patient goals :  To not have back pain    Plan:    Plan  Times per week: 2  Plan weeks: 4-6 weeks  Current Treatment Recommendations: Strengthening, ROM, Balance Training, Functional Mobility Training, Gait Training, Stair training, Manual

## 2018-03-21 ENCOUNTER — HOSPITAL ENCOUNTER (OUTPATIENT)
Dept: PHYSICAL THERAPY | Age: 68
Setting detail: THERAPIES SERIES
Discharge: HOME | End: 2018-03-21
Payer: MEDICARE

## 2018-03-21 ENCOUNTER — TELEPHONE (OUTPATIENT)
Dept: INTERNAL MEDICINE | Age: 68
End: 2018-03-21

## 2018-03-21 ENCOUNTER — ANTI-COAG VISIT (OUTPATIENT)
Dept: INTERNAL MEDICINE | Age: 68
End: 2018-03-21

## 2018-03-21 ENCOUNTER — NURSE ONLY (OUTPATIENT)
Dept: INTERNAL MEDICINE | Age: 68
End: 2018-03-21
Payer: MEDICARE

## 2018-03-21 DIAGNOSIS — Z79.01 ENCOUNTER FOR CURRENT LONG-TERM USE OF ANTICOAGULANTS: Primary | ICD-10-CM

## 2018-03-21 LAB
INTERNATIONAL NORMALIZATION RATIO, POC: 2.3
PROTHROMBIN TIME, POC: NORMAL

## 2018-03-21 PROCEDURE — 85610 PROTHROMBIN TIME: CPT | Performed by: INTERNAL MEDICINE

## 2018-03-21 PROCEDURE — 97110 THERAPEUTIC EXERCISES: CPT

## 2018-03-21 RX ORDER — LISINOPRIL 5 MG/1
5 TABLET ORAL DAILY
Qty: 30 TABLET | Refills: 3 | Status: SHIPPED | OUTPATIENT
Start: 2018-03-21 | End: 2018-03-23 | Stop reason: DRUGHIGH

## 2018-03-21 ASSESSMENT — PAIN SCALES - GENERAL: PAINLEVEL_OUTOF10: 5

## 2018-03-21 ASSESSMENT — PAIN DESCRIPTION - LOCATION: LOCATION: BACK;KNEE

## 2018-03-21 ASSESSMENT — PAIN DESCRIPTION - PAIN TYPE: TYPE: CHRONIC PAIN

## 2018-03-21 NOTE — PROGRESS NOTES
Ms. Sayda Munoz was here today. INR today: 2.3    INR Goal: 2.0-3.0    Dosing Plan  As of 3/21/2018    TTR:   17.3 % (2.4 wk)   Full instructions:   10 mg every day                 PLAN: CONTINUE CURRENT DOSE    NEXT COUMADIN CLINIC APT IS: PT HAVING KNEE SURGERY WILL CALL TO SCHEDULE NEXT APPT. [facility] INTERNAL MEDIC[facility] COUMADIN CLINIC  [address] [facility] EFFECTS -- The major complication associated with warfarin is bleeding due to excessive anticoagulation. Excessive bleeding, or hemorrhage, can occur from any area of the body, and patients on warfarin should report any falls or accidents, as well as signs or symptoms of bleeding or unusual bruising. Signs of unusual bleeding include bleeding from the gums, blood in the urine, bloody or dark stool, a nosebleed, or vomiting blood. Because the risk of bleeding increases as the INR rises, the INR is closely monitored and adjustments are made as needed to maintain the INR within the target range. Shantel Belt also cause skin necrosis or gangrene, which can cause dark red or black areas on the skin. This is a rare complication that may occur during the first several days of warfarin therapy. When to seek help -- If there are obvious or subtle signs of bleeding, including the following, patients should call their healthcare provider immediately.   · Persistent nausea, stomach upset, or vomiting blood or other material that looks like coffee grounds   · Headaches, dizziness, or weakness   · Nosebleeds   · Dark red or brown urine   · Blood in the bowel movement or dark-colored stool   · Pain, discomfort, or swelling, especially after an injury   · After a serious fall or head injury, even if there are no other symptoms  The patient should also call if any of the following occurs:  · Bleeding from the gums after brushing the teeth   · Swelling or pain at an injection site   · Excessive menstrual bleeding or bleeding between menstrual periods increase the risk of injury, and therefore bleeding. Warfarin and medications -- A number of medications, herbs, and vitamins can interact with warfarin (table 2 and table 3). This interaction may affect the action of warfarin or the other medication. If warfarin is affected, the dose may need to be adjusted (up or down) to maintain an optimal coagulation effect. Patients who take warfarin should consult with their clinician before taking any new medication, including over-the-counter (non-prescription) drugs, herbal medicines, vitamins, or any other products. Some of the most common over-the-counter pain relievers, including acetaminophen (Tylenol®), aspirin, and nonsteroidal antiinflammatory drugs (such as ibuprofen [Advil®]) and naproxen (Aleve®), enhance the anticoagulant effects of warfarin. Vitamin E may increase the anticoagulant effects of warfarin. Consult a healthcare provider before adding or changing a dose of vitamin E or any other vitamin. Wear medical identification -- People who require long-term warfarin should wear a bracelet, necklace, or similar alert tag at all times. If an accident occurs and the person is too ill to explain their condition, this will help responders provide appropriate care. The alert tag should include a list of major medical conditions and the reason warfarin is needed (eg, atrial fibrillation), as well as the name and phone number of an emergency contact. One device, Medic Alert®, provides a toll-free number that emergency medical workers can call to find out a person's medical history, list of medications, family emergency contact numbers, and healthcare provider names and numbers.     GRAPHICS: Table 1  Foods with moderate to high levels of vitamin K  Food name Serving size Vitamin K (micrograms)   High level vitamin K foods   Kale, frozen (cooked or boiled, drained) 1/2 cup 570   Kale, fresh, (cooked or boiled, drained) 1/2 cup 530   Spinach, frozen (cooked or

## 2018-03-21 NOTE — PROGRESS NOTES
Physical Therapy  Daily Treatment Note  Date: 3/21/2018  Patient Name: Taylor Romo  MRN: [id number]     :   1950    Subjective:   General  Chart Reviewed: Yes  Additional Pertinent Hx: Ms. Miryam Wilkins presents to therapy with complaint of low back pain that began after a fall onto her back. Additional pertinent medical history includes cardiac pacemaker, dizziness, lupus, osteoarthritis, osteoporosis, DM2. Response To Previous Treatment: Not applicable  Family / Caregiver Present: No  Referring Practitioner: TWAN Moise  PT Visit Information  Onset Date: 18  PT Insurance Information: Medicare, [place] (no precert required)  Total # of Visits Approved: 12  Total # of Visits to Date: 6  Plan of Care/Certification Expiration Date: 18  Progress Note Due Date: 18  Subjective  Subjective: Patient reports she has some swelling in her left ankle today that happens sometimes ever since she fractured it. She also reports she has to leave early due to taking her grandson to an appointment. General Comment  Comments: Patient reports upcoming R TKA 3/26/2018  Pain Screening  Patient Currently in Pain: Yes  Pain Assessment  Pain Assessment: 0-10  Pain Level: 5  Pain Type: Chronic pain  Pain Location: Back;Knee  Vital Signs  Patient Currently in Pain: Yes       Treatment Activities:                                      Exercises  Exercise 1: Seated forward bend lumbar stretch to R   x 10   not today  Exercise 2: Seated lumbar rotation bilateral  x 10  Exercise 3: Seated sidebend to R  x 10 - not today  Exercise 4: Seated marching - x 10   Exercise 5: LAQ - x 10 bilaterally  Exercise 6: Hip abduction/adduction x 10 red  Exercise 7: Hamstring curls x 10 red  Exercise 8: Slouch with overcorrect x 15  Exercise 9: Multifidus row - x 10 bilateral with yellow t-band  not today  Exercise 10: Mini squats x 15--   not today  Exercise 11:  Forward steps x 10--  Exercise 12: Hip abduction standing x 10 Soft Tissue Mobilization, Manual Therapy - Joint Manipulation, Pain Management, Home Exercise Program, Modalities  Timed Code Treatment Minutes: 23 Minutes (moist heat low back, ice pack right knee x 10 mins)     Therapy Time   Individual Concurrent Group Co-treatment   Time In 0800         Time Out 0823         Minutes 23         Timed Code Treatment Minutes: 23 Minutes (moist heat low back, ice pack right knee x 10 mins)       Khanh Laurent PT    Electronically signed by Khanh Laurent PT on 3/21/2018 at 11:50 AM

## 2018-03-22 PROBLEM — M17.11 PRIMARY OSTEOARTHRITIS OF RIGHT KNEE: Status: ACTIVE | Noted: 2018-03-22

## 2018-03-23 ENCOUNTER — OFFICE VISIT (OUTPATIENT)
Dept: INTERNAL MEDICINE | Age: 68
End: 2018-03-23
Payer: MEDICARE

## 2018-03-23 ENCOUNTER — HOSPITAL ENCOUNTER (OUTPATIENT)
Dept: PHYSICAL THERAPY | Age: 68
Setting detail: THERAPIES SERIES
Discharge: HOME | End: 2018-03-23
Payer: MEDICARE

## 2018-03-23 VITALS
SYSTOLIC BLOOD PRESSURE: 180 MMHG | DIASTOLIC BLOOD PRESSURE: 98 MMHG | OXYGEN SATURATION: 97 % | WEIGHT: 235 LBS | HEART RATE: 72 BPM | HEIGHT: 67 IN | BODY MASS INDEX: 36.88 KG/M2 | RESPIRATION RATE: 20 BRPM

## 2018-03-23 DIAGNOSIS — R11.0 NAUSEA: ICD-10-CM

## 2018-03-23 DIAGNOSIS — I10 ESSENTIAL HYPERTENSION: Primary | ICD-10-CM

## 2018-03-23 PROCEDURE — 99214 OFFICE O/P EST MOD 30 MIN: CPT | Performed by: INTERNAL MEDICINE

## 2018-03-23 PROCEDURE — G8482 FLU IMMUNIZE ORDER/ADMIN: HCPCS | Performed by: INTERNAL MEDICINE

## 2018-03-23 PROCEDURE — 3017F COLORECTAL CA SCREEN DOC REV: CPT | Performed by: INTERNAL MEDICINE

## 2018-03-23 PROCEDURE — 97110 THERAPEUTIC EXERCISES: CPT

## 2018-03-23 PROCEDURE — G8979 MOBILITY GOAL STATUS: HCPCS

## 2018-03-23 PROCEDURE — 1123F ACP DISCUSS/DSCN MKR DOCD: CPT | Performed by: INTERNAL MEDICINE

## 2018-03-23 PROCEDURE — G8417 CALC BMI ABV UP PARAM F/U: HCPCS | Performed by: INTERNAL MEDICINE

## 2018-03-23 PROCEDURE — G8598 ASA/ANTIPLAT THER USED: HCPCS | Performed by: INTERNAL MEDICINE

## 2018-03-23 PROCEDURE — 1090F PRES/ABSN URINE INCON ASSESS: CPT | Performed by: INTERNAL MEDICINE

## 2018-03-23 PROCEDURE — 1036F TOBACCO NON-USER: CPT | Performed by: INTERNAL MEDICINE

## 2018-03-23 PROCEDURE — G8980 MOBILITY D/C STATUS: HCPCS

## 2018-03-23 PROCEDURE — G8427 DOCREV CUR MEDS BY ELIG CLIN: HCPCS | Performed by: INTERNAL MEDICINE

## 2018-03-23 PROCEDURE — G8978 MOBILITY CURRENT STATUS: HCPCS

## 2018-03-23 PROCEDURE — G8400 PT W/DXA NO RESULTS DOC: HCPCS | Performed by: INTERNAL MEDICINE

## 2018-03-23 PROCEDURE — 3014F SCREEN MAMMO DOC REV: CPT | Performed by: INTERNAL MEDICINE

## 2018-03-23 PROCEDURE — 4040F PNEUMOC VAC/ADMIN/RCVD: CPT | Performed by: INTERNAL MEDICINE

## 2018-03-23 RX ORDER — LISINOPRIL 5 MG/1
20 TABLET ORAL DAILY
Qty: 30 TABLET | Refills: 3 | Status: ON HOLD | OUTPATIENT
Start: 2018-03-23 | End: 2018-07-25 | Stop reason: CLARIF

## 2018-03-23 ASSESSMENT — PAIN DESCRIPTION - ORIENTATION: ORIENTATION: RIGHT;MID;LOWER

## 2018-03-23 ASSESSMENT — PAIN DESCRIPTION - DESCRIPTORS: DESCRIPTORS: ACHING

## 2018-03-23 ASSESSMENT — PAIN SCALES - GENERAL: PAINLEVEL_OUTOF10: 7

## 2018-03-23 ASSESSMENT — PAIN DESCRIPTION - PAIN TYPE: TYPE: CHRONIC PAIN

## 2018-03-23 ASSESSMENT — PAIN DESCRIPTION - LOCATION: LOCATION: BACK;KNEE

## 2018-03-23 NOTE — PROGRESS NOTES
10 red  Exercise 7: Hamstring curls x 10 red not today  Exercise 8: Slouch with overcorrect x 15  Exercise 9: Multifidus row - x 10 bilateral with yellow t-band  not today  Exercise 10: Mini squats x 15--   not today  Exercise 11: Forward steps x 10-- not today  Exercise 12: Hip abduction standing x 10 bilateral-- not today  Exercise 13: LBE   - not today --DECLINED  Exercise 14: Ambulation  200' today during 6 MWT  - not today  Exercise 18: HEP issued 3/16/18 with theraband  Exercise 20: NO estim d/t pacemaker                                   Assessment:   Conditions Requiring Skilled Therapeutic Intervention  Body structures, Functions, Activity limitations: Decreased functional mobility ; Decreased ADL status; Decreased ROM; Decreased strength;Decreased high-level IADLs;Decreased balance  Assessment: Mrs. Della Webber has made modest progress since initiation of therapy with low back pain and ability to perform functional mobility. Patient continues to have significant knee pain which is likely contributing to back use and pain. Patient has made progress toward majority of goals and will continue to do so with further therapy. Treatment Diagnosis: Low back pain  Patient Education: Exercises      G-Code:  PT G-Codes  Functional Assessment Tool Used: Oswestry Disability Questionnaire  Score: 31% impairment  Functional Limitation: Mobility: Walking and moving around  Mobility: Walking and Moving Around Current Status (): At least 20 percent but less than 40 percent impaired, limited or restricted  Mobility: Walking and Moving Around Goal Status ():  At least 20 percent but less than 40 percent impaired, limited or restricted    Goals:  Short term goals  Time Frame for Short term goals: 3-4 weeks  Short term goal 1: Patient will be independent with HEP - met (3/23 Patient reports performing exercises at home at least once a day)  Short term goal 2: Patient will decrease TTP L lower lumbar region to demonstrate

## 2018-03-23 NOTE — PATIENT INSTRUCTIONS
Patient Education        Learning About ACE Inhibitors  Introduction    ACE (angiotensin-converting enzyme) inhibitors stop the release of an enzyme. This enzyme makes your blood vessels smaller. Without it, your blood vessels relax and get bigger. This lowers your blood pressure. These medicines also increase how much water and salt go into your urine. This also lowers blood pressure. You may take this kind of medicine if you have high blood pressure. Or you may take it if you have heart problems, kidney problems, or diabetes. If you have coronary artery disease, this medicine can help prevent heart attacks and strokes. Examples  · Benazepril (Lotensin)  · Lisinopril (Prinivil, Zestril)  · Ramipril (Altace)  This is not a complete list.  Possible side effects  Side effects may include:  · A cough. · Low blood pressure. This can make you feel dizzy or weak. · Too much potassium in your body. · Swelling. This may be a sign of an allergic reaction. You may have other side effects or reactions not listed here. Check the information that comes with your medicine. What to know about taking this medicine  · ACE inhibitors can cause a dry cough. Talk to your doctor if you have a dry cough. You may need a different medicine. · These medicines can cause an allergic reaction. This can cause a little swelling. Or it can cause red bumps on your skin that hurt. In rare cases, the swelling may make it hard for you to breathe. · Do not take this medicine if you are pregnant. And don't take it if you plan to become pregnant. · Take your medicines exactly as prescribed. Call your doctor if you think you are having a problem with your medicine. · Check with your doctor or pharmacist before you use any other medicines. This includes over-the-counter medicines. Make sure your doctor knows all of the medicines, vitamins, herbal products, and supplements you take. Taking some medicines together can cause problems.   · You may

## 2018-03-25 ASSESSMENT — ENCOUNTER SYMPTOMS
CONSTIPATION: 0
VOICE CHANGE: 0
NAUSEA: 1
COLOR CHANGE: 0
DIARRHEA: 0
ABDOMINAL DISTENTION: 0
CHEST TIGHTNESS: 0
VOMITING: 0
COUGH: 0
SINUS PRESSURE: 0
SINUS PAIN: 0
SHORTNESS OF BREATH: 0
WHEEZING: 0
ABDOMINAL PAIN: 0
SORE THROAT: 0
EYE ITCHING: 0
TROUBLE SWALLOWING: 0
BLOOD IN STOOL: 0
EYE DISCHARGE: 0
BACK PAIN: 0
APNEA: 0
STRIDOR: 0
RHINORRHEA: 0

## 2018-03-26 ENCOUNTER — ANESTHESIA (OUTPATIENT)
Dept: OPERATING ROOM | Age: 68
DRG: 470 | End: 2018-03-26
Payer: MEDICARE

## 2018-03-26 ENCOUNTER — APPOINTMENT (OUTPATIENT)
Dept: GENERAL RADIOLOGY | Age: 68
DRG: 470 | End: 2018-03-26
Attending: ORTHOPAEDIC SURGERY
Payer: MEDICARE

## 2018-03-26 ENCOUNTER — HOSPITAL ENCOUNTER (INPATIENT)
Age: 68
LOS: 4 days | Discharge: HOME HEALTH | DRG: 470 | End: 2018-03-30
Attending: ORTHOPAEDIC SURGERY | Admitting: ORTHOPAEDIC SURGERY
Payer: MEDICARE

## 2018-03-26 ENCOUNTER — ANESTHESIA EVENT (OUTPATIENT)
Dept: OPERATING ROOM | Age: 68
DRG: 470 | End: 2018-03-26
Payer: MEDICARE

## 2018-03-26 VITALS
SYSTOLIC BLOOD PRESSURE: 169 MMHG | OXYGEN SATURATION: 96 % | RESPIRATION RATE: 4 BRPM | TEMPERATURE: 96.7 F | DIASTOLIC BLOOD PRESSURE: 95 MMHG

## 2018-03-26 PROBLEM — K59.01 SLOW TRANSIT CONSTIPATION: Status: ACTIVE | Noted: 2018-03-26

## 2018-03-26 PROBLEM — I10 ESSENTIAL HYPERTENSION: Status: ACTIVE | Noted: 2018-03-26

## 2018-03-26 PROBLEM — M17.10 ARTHRITIS OF KNEE: Status: ACTIVE | Noted: 2018-03-26

## 2018-03-26 PROBLEM — G47.33 OSA (OBSTRUCTIVE SLEEP APNEA): Status: ACTIVE | Noted: 2018-03-26

## 2018-03-26 PROBLEM — D50.9 IRON DEFICIENCY ANEMIA: Status: ACTIVE | Noted: 2018-03-26

## 2018-03-26 PROBLEM — E11.65 TYPE 2 DIABETES MELLITUS WITH HYPERGLYCEMIA (HCC): Status: ACTIVE | Noted: 2018-03-26

## 2018-03-26 LAB
ABO/RH: NORMAL
ANTIBODY SCREEN: NORMAL
GLUCOSE BLD-MCNC: 133 MG/DL (ref 70–99)
GLUCOSE BLD-MCNC: 181 MG/DL (ref 70–99)
GLUCOSE BLD-MCNC: 87 MG/DL (ref 70–99)
HBA1C MFR BLD: 5.8 %
INR BLD: 1.95 (ref 0.88–1.18)
PERFORMED ON: ABNORMAL
PERFORMED ON: ABNORMAL
PERFORMED ON: NORMAL
PROTHROMBIN TIME: 22.3 SEC (ref 12–14.6)
SODIUM URINE: 137 MMOL/L

## 2018-03-26 PROCEDURE — C9290 INJ, BUPIVACAINE LIPOSOME: HCPCS | Performed by: ORTHOPAEDIC SURGERY

## 2018-03-26 PROCEDURE — C1776 JOINT DEVICE (IMPLANTABLE): HCPCS | Performed by: ORTHOPAEDIC SURGERY

## 2018-03-26 PROCEDURE — 6360000002 HC RX W HCPCS: Performed by: ORTHOPAEDIC SURGERY

## 2018-03-26 PROCEDURE — 2500000003 HC RX 250 WO HCPCS: Performed by: ORTHOPAEDIC SURGERY

## 2018-03-26 PROCEDURE — 94664 DEMO&/EVAL PT USE INHALER: CPT

## 2018-03-26 PROCEDURE — 6360000002 HC RX W HCPCS

## 2018-03-26 PROCEDURE — 84300 ASSAY OF URINE SODIUM: CPT

## 2018-03-26 PROCEDURE — 7100000001 HC PACU RECOVERY - ADDTL 15 MIN: Performed by: ORTHOPAEDIC SURGERY

## 2018-03-26 PROCEDURE — 83036 HEMOGLOBIN GLYCOSYLATED A1C: CPT

## 2018-03-26 PROCEDURE — 2580000003 HC RX 258: Performed by: ORTHOPAEDIC SURGERY

## 2018-03-26 PROCEDURE — 3600000015 HC SURGERY LEVEL 5 ADDTL 15MIN: Performed by: ORTHOPAEDIC SURGERY

## 2018-03-26 PROCEDURE — 6370000000 HC RX 637 (ALT 250 FOR IP): Performed by: ORTHOPAEDIC SURGERY

## 2018-03-26 PROCEDURE — 3700000000 HC ANESTHESIA ATTENDED CARE: Performed by: ORTHOPAEDIC SURGERY

## 2018-03-26 PROCEDURE — 86850 RBC ANTIBODY SCREEN: CPT

## 2018-03-26 PROCEDURE — C9250 ARTISS FIBRIN SEALANT: HCPCS | Performed by: ORTHOPAEDIC SURGERY

## 2018-03-26 PROCEDURE — C1713 ANCHOR/SCREW BN/BN,TIS/BN: HCPCS | Performed by: ORTHOPAEDIC SURGERY

## 2018-03-26 PROCEDURE — 86901 BLOOD TYPING SEROLOGIC RH(D): CPT

## 2018-03-26 PROCEDURE — 3209999900 FLUORO FOR SURGICAL PROCEDURES

## 2018-03-26 PROCEDURE — 73560 X-RAY EXAM OF KNEE 1 OR 2: CPT

## 2018-03-26 PROCEDURE — 3700000001 HC ADD 15 MINUTES (ANESTHESIA): Performed by: ORTHOPAEDIC SURGERY

## 2018-03-26 PROCEDURE — 6360000002 HC RX W HCPCS: Performed by: ANESTHESIOLOGY

## 2018-03-26 PROCEDURE — 86900 BLOOD TYPING SEROLOGIC ABO: CPT

## 2018-03-26 PROCEDURE — 85610 PROTHROMBIN TIME: CPT

## 2018-03-26 PROCEDURE — 2780000010 HC IMPLANT OTHER: Performed by: ORTHOPAEDIC SURGERY

## 2018-03-26 PROCEDURE — 82948 REAGENT STRIP/BLOOD GLUCOSE: CPT

## 2018-03-26 PROCEDURE — 2700000000 HC OXYGEN THERAPY PER DAY

## 2018-03-26 PROCEDURE — 6370000000 HC RX 637 (ALT 250 FOR IP): Performed by: CLINICAL NURSE SPECIALIST

## 2018-03-26 PROCEDURE — 6360000002 HC RX W HCPCS: Performed by: NURSE ANESTHETIST, CERTIFIED REGISTERED

## 2018-03-26 PROCEDURE — 1210000000 HC MED SURG R&B

## 2018-03-26 PROCEDURE — 7100000000 HC PACU RECOVERY - FIRST 15 MIN: Performed by: ORTHOPAEDIC SURGERY

## 2018-03-26 PROCEDURE — 3600000005 HC SURGERY LEVEL 5 BASE: Performed by: ORTHOPAEDIC SURGERY

## 2018-03-26 PROCEDURE — 36415 COLL VENOUS BLD VENIPUNCTURE: CPT

## 2018-03-26 PROCEDURE — 64447 NJX AA&/STRD FEMORAL NRV IMG: CPT | Performed by: NURSE ANESTHETIST, CERTIFIED REGISTERED

## 2018-03-26 PROCEDURE — 2580000003 HC RX 258: Performed by: NURSE ANESTHETIST, CERTIFIED REGISTERED

## 2018-03-26 PROCEDURE — 0SRC0J9 REPLACEMENT OF RIGHT KNEE JOINT WITH SYNTHETIC SUBSTITUTE, CEMENTED, OPEN APPROACH: ICD-10-PCS | Performed by: ORTHOPAEDIC SURGERY

## 2018-03-26 PROCEDURE — 3E0T3BZ INTRODUCTION OF ANESTHETIC AGENT INTO PERIPHERAL NERVES AND PLEXI, PERCUTANEOUS APPROACH: ICD-10-PCS | Performed by: ORTHOPAEDIC SURGERY

## 2018-03-26 PROCEDURE — 2500000003 HC RX 250 WO HCPCS: Performed by: NURSE ANESTHETIST, CERTIFIED REGISTERED

## 2018-03-26 PROCEDURE — 2720000001 HC MISC SURG SUPPLY STERILE $51-500: Performed by: ORTHOPAEDIC SURGERY

## 2018-03-26 PROCEDURE — P9045 ALBUMIN (HUMAN), 5%, 250 ML: HCPCS | Performed by: NURSE ANESTHETIST, CERTIFIED REGISTERED

## 2018-03-26 DEVICE — AGENT HEMSTAT HUM FBRN SEAL EVICEL KT: Type: IMPLANTABLE DEVICE | Site: KNEE | Status: FUNCTIONAL

## 2018-03-26 DEVICE — NEXGEN STRAIGHT STEM EXT 15MM DIA X 75MM(30MM): Type: IMPLANTABLE DEVICE | Site: KNEE | Status: FUNCTIONAL

## 2018-03-26 DEVICE — IMPLANTABLE DEVICE: Type: IMPLANTABLE DEVICE | Site: KNEE | Status: FUNCTIONAL

## 2018-03-26 DEVICE — DISCONTINUED USE 415964 CEMENT PALACOS R + G SING DOSE 40GR: Type: IMPLANTABLE DEVICE | Site: KNEE | Status: FUNCTIONAL

## 2018-03-26 DEVICE — COMPONENT PAT DIA35MM THK9MM KNEE POLY CEM CONVENTIONAL: Type: IMPLANTABLE DEVICE | Site: KNEE | Status: FUNCTIONAL

## 2018-03-26 DEVICE — NEXGEN PRECOAT STEMMED TIBIAL PLATE SZ 5: Type: IMPLANTABLE DEVICE | Site: KNEE | Status: FUNCTIONAL

## 2018-03-26 RX ORDER — DEXTROSE MONOHYDRATE 25 G/50ML
12.5 INJECTION, SOLUTION INTRAVENOUS PRN
Status: DISCONTINUED | OUTPATIENT
Start: 2018-03-26 | End: 2018-03-30 | Stop reason: HOSPADM

## 2018-03-26 RX ORDER — PROMETHAZINE HYDROCHLORIDE 25 MG/ML
6.25 INJECTION, SOLUTION INTRAMUSCULAR; INTRAVENOUS
Status: DISCONTINUED | OUTPATIENT
Start: 2018-03-26 | End: 2018-03-26 | Stop reason: HOSPADM

## 2018-03-26 RX ORDER — MEPERIDINE HYDROCHLORIDE 50 MG/ML
12.5 INJECTION INTRAMUSCULAR; INTRAVENOUS; SUBCUTANEOUS EVERY 5 MIN PRN
Status: DISCONTINUED | OUTPATIENT
Start: 2018-03-26 | End: 2018-03-26 | Stop reason: HOSPADM

## 2018-03-26 RX ORDER — DIPHENHYDRAMINE HYDROCHLORIDE 50 MG/ML
12.5 INJECTION INTRAMUSCULAR; INTRAVENOUS
Status: DISCONTINUED | OUTPATIENT
Start: 2018-03-26 | End: 2018-03-26 | Stop reason: HOSPADM

## 2018-03-26 RX ORDER — HYDROMORPHONE HYDROCHLORIDE 2 MG/1
2 TABLET ORAL
Status: DISCONTINUED | OUTPATIENT
Start: 2018-03-26 | End: 2018-03-30 | Stop reason: HOSPADM

## 2018-03-26 RX ORDER — WARFARIN SODIUM 5 MG/1
10 TABLET ORAL DAILY
Status: DISCONTINUED | OUTPATIENT
Start: 2018-03-26 | End: 2018-03-27

## 2018-03-26 RX ORDER — 0.9 % SODIUM CHLORIDE 0.9 %
500 INTRAVENOUS SOLUTION INTRAVENOUS PRN
Status: DISCONTINUED | OUTPATIENT
Start: 2018-03-26 | End: 2018-03-30 | Stop reason: HOSPADM

## 2018-03-26 RX ORDER — SODIUM CHLORIDE, SODIUM LACTATE, POTASSIUM CHLORIDE, CALCIUM CHLORIDE 600; 310; 30; 20 MG/100ML; MG/100ML; MG/100ML; MG/100ML
INJECTION, SOLUTION INTRAVENOUS CONTINUOUS
Status: DISCONTINUED | OUTPATIENT
Start: 2018-03-26 | End: 2018-03-30 | Stop reason: HOSPADM

## 2018-03-26 RX ORDER — MORPHINE SULFATE 4 MG/ML
2 INJECTION, SOLUTION INTRAMUSCULAR; INTRAVENOUS EVERY 5 MIN PRN
Status: DISCONTINUED | OUTPATIENT
Start: 2018-03-26 | End: 2018-03-26 | Stop reason: HOSPADM

## 2018-03-26 RX ORDER — HYDRALAZINE HYDROCHLORIDE 20 MG/ML
5 INJECTION INTRAMUSCULAR; INTRAVENOUS EVERY 10 MIN PRN
Status: DISCONTINUED | OUTPATIENT
Start: 2018-03-26 | End: 2018-03-26 | Stop reason: HOSPADM

## 2018-03-26 RX ORDER — MORPHINE SULFATE 4 MG/ML
INJECTION, SOLUTION INTRAMUSCULAR; INTRAVENOUS PRN
Status: DISCONTINUED | OUTPATIENT
Start: 2018-03-26 | End: 2018-03-26 | Stop reason: SDUPTHER

## 2018-03-26 RX ORDER — LIDOCAINE HYDROCHLORIDE 10 MG/ML
INJECTION, SOLUTION INFILTRATION; PERINEURAL PRN
Status: DISCONTINUED | OUTPATIENT
Start: 2018-03-26 | End: 2018-03-26 | Stop reason: SDUPTHER

## 2018-03-26 RX ORDER — FENTANYL CITRATE 50 UG/ML
INJECTION, SOLUTION INTRAMUSCULAR; INTRAVENOUS PRN
Status: DISCONTINUED | OUTPATIENT
Start: 2018-03-26 | End: 2018-03-26 | Stop reason: SDUPTHER

## 2018-03-26 RX ORDER — FENTANYL CITRATE 50 UG/ML
50 INJECTION, SOLUTION INTRAMUSCULAR; INTRAVENOUS
Status: DISCONTINUED | OUTPATIENT
Start: 2018-03-26 | End: 2018-03-30 | Stop reason: HOSPADM

## 2018-03-26 RX ORDER — ACETAMINOPHEN 500 MG
1000 TABLET ORAL EVERY 8 HOURS
Status: DISCONTINUED | OUTPATIENT
Start: 2018-03-26 | End: 2018-03-30 | Stop reason: HOSPADM

## 2018-03-26 RX ORDER — FENTANYL CITRATE 50 UG/ML
75 INJECTION, SOLUTION INTRAMUSCULAR; INTRAVENOUS
Status: DISCONTINUED | OUTPATIENT
Start: 2018-03-26 | End: 2018-03-30 | Stop reason: HOSPADM

## 2018-03-26 RX ORDER — SUCCINYLCHOLINE CHLORIDE 20 MG/ML
INJECTION INTRAMUSCULAR; INTRAVENOUS PRN
Status: DISCONTINUED | OUTPATIENT
Start: 2018-03-26 | End: 2018-03-26 | Stop reason: SDUPTHER

## 2018-03-26 RX ORDER — VECURONIUM BROMIDE 1 MG/ML
INJECTION, POWDER, LYOPHILIZED, FOR SOLUTION INTRAVENOUS PRN
Status: DISCONTINUED | OUTPATIENT
Start: 2018-03-26 | End: 2018-03-26 | Stop reason: SDUPTHER

## 2018-03-26 RX ORDER — ENALAPRILAT 2.5 MG/2ML
1.25 INJECTION INTRAVENOUS
Status: DISCONTINUED | OUTPATIENT
Start: 2018-03-26 | End: 2018-03-26 | Stop reason: HOSPADM

## 2018-03-26 RX ORDER — FENTANYL CITRATE 50 UG/ML
50 INJECTION, SOLUTION INTRAMUSCULAR; INTRAVENOUS
Status: DISCONTINUED | OUTPATIENT
Start: 2018-03-26 | End: 2018-03-26 | Stop reason: HOSPADM

## 2018-03-26 RX ORDER — DIPHENHYDRAMINE HCL 25 MG
25 TABLET ORAL EVERY 6 HOURS PRN
Status: DISCONTINUED | OUTPATIENT
Start: 2018-03-26 | End: 2018-03-30 | Stop reason: HOSPADM

## 2018-03-26 RX ORDER — CLINDAMYCIN PHOSPHATE 900 MG/50ML
900 INJECTION INTRAVENOUS EVERY 8 HOURS
Status: COMPLETED | OUTPATIENT
Start: 2018-03-26 | End: 2018-03-28

## 2018-03-26 RX ORDER — LABETALOL HYDROCHLORIDE 5 MG/ML
5 INJECTION, SOLUTION INTRAVENOUS EVERY 10 MIN PRN
Status: DISCONTINUED | OUTPATIENT
Start: 2018-03-26 | End: 2018-03-26 | Stop reason: HOSPADM

## 2018-03-26 RX ORDER — NICOTINE POLACRILEX 4 MG
15 LOZENGE BUCCAL PRN
Status: DISCONTINUED | OUTPATIENT
Start: 2018-03-26 | End: 2018-03-30 | Stop reason: HOSPADM

## 2018-03-26 RX ORDER — ROCURONIUM BROMIDE 10 MG/ML
INJECTION, SOLUTION INTRAVENOUS PRN
Status: DISCONTINUED | OUTPATIENT
Start: 2018-03-26 | End: 2018-03-26 | Stop reason: SDUPTHER

## 2018-03-26 RX ORDER — DIAZEPAM 5 MG/1
5 TABLET ORAL EVERY 6 HOURS PRN
Status: DISCONTINUED | OUTPATIENT
Start: 2018-03-26 | End: 2018-03-30 | Stop reason: HOSPADM

## 2018-03-26 RX ORDER — BUMETANIDE 1 MG/1
1 TABLET ORAL DAILY
Status: DISCONTINUED | OUTPATIENT
Start: 2018-03-26 | End: 2018-03-28

## 2018-03-26 RX ORDER — ESCITALOPRAM OXALATE 10 MG/1
10 TABLET ORAL DAILY
Status: DISCONTINUED | OUTPATIENT
Start: 2018-03-26 | End: 2018-03-30 | Stop reason: HOSPADM

## 2018-03-26 RX ORDER — OXYCODONE HCL 10 MG/1
10 TABLET, FILM COATED, EXTENDED RELEASE ORAL ONCE
Status: DISCONTINUED | OUTPATIENT
Start: 2018-03-26 | End: 2018-03-26

## 2018-03-26 RX ORDER — SODIUM CHLORIDE, SODIUM LACTATE, POTASSIUM CHLORIDE, CALCIUM CHLORIDE 600; 310; 30; 20 MG/100ML; MG/100ML; MG/100ML; MG/100ML
INJECTION, SOLUTION INTRAVENOUS CONTINUOUS PRN
Status: DISCONTINUED | OUTPATIENT
Start: 2018-03-26 | End: 2018-03-26 | Stop reason: SDUPTHER

## 2018-03-26 RX ORDER — WARFARIN SODIUM 5 MG/1
10 TABLET ORAL DAILY
Status: DISCONTINUED | OUTPATIENT
Start: 2018-03-26 | End: 2018-03-26

## 2018-03-26 RX ORDER — LEVOTHYROXINE SODIUM 0.1 MG/1
100 TABLET ORAL DAILY
Status: DISCONTINUED | OUTPATIENT
Start: 2018-03-26 | End: 2018-03-30 | Stop reason: HOSPADM

## 2018-03-26 RX ORDER — MIDAZOLAM HYDROCHLORIDE 1 MG/ML
2 INJECTION INTRAMUSCULAR; INTRAVENOUS
Status: COMPLETED | OUTPATIENT
Start: 2018-03-26 | End: 2018-03-26

## 2018-03-26 RX ORDER — MORPHINE SULFATE 4 MG/ML
4 INJECTION, SOLUTION INTRAMUSCULAR; INTRAVENOUS
Status: DISCONTINUED | OUTPATIENT
Start: 2018-03-26 | End: 2018-03-26 | Stop reason: HOSPADM

## 2018-03-26 RX ORDER — DEXAMETHASONE SODIUM PHOSPHATE 10 MG/ML
INJECTION INTRAMUSCULAR; INTRAVENOUS PRN
Status: DISCONTINUED | OUTPATIENT
Start: 2018-03-26 | End: 2018-03-26 | Stop reason: SDUPTHER

## 2018-03-26 RX ORDER — DEXTROSE MONOHYDRATE 50 MG/ML
100 INJECTION, SOLUTION INTRAVENOUS PRN
Status: DISCONTINUED | OUTPATIENT
Start: 2018-03-26 | End: 2018-03-30 | Stop reason: HOSPADM

## 2018-03-26 RX ORDER — MORPHINE SULFATE 4 MG/ML
4 INJECTION, SOLUTION INTRAMUSCULAR; INTRAVENOUS EVERY 5 MIN PRN
Status: DISCONTINUED | OUTPATIENT
Start: 2018-03-26 | End: 2018-03-26 | Stop reason: HOSPADM

## 2018-03-26 RX ORDER — CELECOXIB 200 MG/1
200 CAPSULE ORAL ONCE
Status: COMPLETED | OUTPATIENT
Start: 2018-03-26 | End: 2018-03-26

## 2018-03-26 RX ORDER — LIDOCAINE HYDROCHLORIDE 10 MG/ML
1 INJECTION, SOLUTION EPIDURAL; INFILTRATION; INTRACAUDAL; PERINEURAL
Status: DISCONTINUED | OUTPATIENT
Start: 2018-03-26 | End: 2018-03-26 | Stop reason: HOSPADM

## 2018-03-26 RX ORDER — SODIUM CHLORIDE 9 MG/ML
INJECTION, SOLUTION INTRAVENOUS CONTINUOUS
Status: DISCONTINUED | OUTPATIENT
Start: 2018-03-26 | End: 2018-03-30 | Stop reason: HOSPADM

## 2018-03-26 RX ORDER — MORPHINE SULFATE 15 MG/1
30 TABLET, FILM COATED, EXTENDED RELEASE ORAL EVERY 12 HOURS SCHEDULED
Status: DISCONTINUED | OUTPATIENT
Start: 2018-03-26 | End: 2018-03-30 | Stop reason: HOSPADM

## 2018-03-26 RX ORDER — PROPOFOL 10 MG/ML
INJECTION, EMULSION INTRAVENOUS PRN
Status: DISCONTINUED | OUTPATIENT
Start: 2018-03-26 | End: 2018-03-26 | Stop reason: SDUPTHER

## 2018-03-26 RX ORDER — ERGOCALCIFEROL 1.25 MG/1
50000 CAPSULE ORAL DAILY
Status: DISCONTINUED | OUTPATIENT
Start: 2018-03-26 | End: 2018-03-30 | Stop reason: HOSPADM

## 2018-03-26 RX ORDER — PREGABALIN 75 MG/1
75 CAPSULE ORAL ONCE
Status: COMPLETED | OUTPATIENT
Start: 2018-03-26 | End: 2018-03-26

## 2018-03-26 RX ORDER — ONDANSETRON 4 MG/1
4 TABLET, FILM COATED ORAL EVERY 8 HOURS PRN
Status: DISCONTINUED | OUTPATIENT
Start: 2018-03-26 | End: 2018-03-30 | Stop reason: HOSPADM

## 2018-03-26 RX ORDER — PANTOPRAZOLE SODIUM 40 MG/1
40 TABLET, DELAYED RELEASE ORAL DAILY
Status: DISCONTINUED | OUTPATIENT
Start: 2018-03-26 | End: 2018-03-30 | Stop reason: HOSPADM

## 2018-03-26 RX ORDER — FAMOTIDINE 20 MG/1
20 TABLET, FILM COATED ORAL DAILY
Status: DISCONTINUED | OUTPATIENT
Start: 2018-03-26 | End: 2018-03-30 | Stop reason: HOSPADM

## 2018-03-26 RX ORDER — ONDANSETRON 2 MG/ML
INJECTION INTRAMUSCULAR; INTRAVENOUS PRN
Status: DISCONTINUED | OUTPATIENT
Start: 2018-03-26 | End: 2018-03-26 | Stop reason: SDUPTHER

## 2018-03-26 RX ORDER — METOCLOPRAMIDE HYDROCHLORIDE 5 MG/ML
10 INJECTION INTRAMUSCULAR; INTRAVENOUS
Status: DISCONTINUED | OUTPATIENT
Start: 2018-03-26 | End: 2018-03-26 | Stop reason: HOSPADM

## 2018-03-26 RX ORDER — FENTANYL CITRATE 50 UG/ML
100 INJECTION, SOLUTION INTRAMUSCULAR; INTRAVENOUS
Status: DISCONTINUED | OUTPATIENT
Start: 2018-03-26 | End: 2018-03-30 | Stop reason: HOSPADM

## 2018-03-26 RX ORDER — SODIUM CHLORIDE 0.9 % (FLUSH) 0.9 %
10 SYRINGE (ML) INJECTION PRN
Status: DISCONTINUED | OUTPATIENT
Start: 2018-03-26 | End: 2018-03-30 | Stop reason: HOSPADM

## 2018-03-26 RX ORDER — ALBUMIN, HUMAN INJ 5% 5 %
SOLUTION INTRAVENOUS PRN
Status: DISCONTINUED | OUTPATIENT
Start: 2018-03-26 | End: 2018-03-26 | Stop reason: SDUPTHER

## 2018-03-26 RX ORDER — SCOLOPAMINE TRANSDERMAL SYSTEM 1 MG/1
1 PATCH, EXTENDED RELEASE TRANSDERMAL ONCE
Status: DISCONTINUED | OUTPATIENT
Start: 2018-03-26 | End: 2018-03-26 | Stop reason: HOSPADM

## 2018-03-26 RX ORDER — MIDAZOLAM HYDROCHLORIDE 1 MG/ML
INJECTION INTRAMUSCULAR; INTRAVENOUS
Status: COMPLETED
Start: 2018-03-26 | End: 2018-03-26

## 2018-03-26 RX ORDER — SUCRALFATE 1 G/1
1 TABLET ORAL EVERY 6 HOURS SCHEDULED
Status: DISCONTINUED | OUTPATIENT
Start: 2018-03-26 | End: 2018-03-30 | Stop reason: HOSPADM

## 2018-03-26 RX ORDER — DOCUSATE SODIUM 100 MG/1
100 CAPSULE, LIQUID FILLED ORAL 2 TIMES DAILY
Status: DISCONTINUED | OUTPATIENT
Start: 2018-03-26 | End: 2018-03-30 | Stop reason: HOSPADM

## 2018-03-26 RX ORDER — SODIUM CHLORIDE 0.9 % (FLUSH) 0.9 %
10 SYRINGE (ML) INJECTION PRN
Status: DISCONTINUED | OUTPATIENT
Start: 2018-03-26 | End: 2018-03-26 | Stop reason: HOSPADM

## 2018-03-26 RX ORDER — HYDROMORPHONE HYDROCHLORIDE 2 MG/1
4 TABLET ORAL
Status: DISCONTINUED | OUTPATIENT
Start: 2018-03-26 | End: 2018-03-30 | Stop reason: HOSPADM

## 2018-03-26 RX ORDER — SODIUM CHLORIDE 0.9 % (FLUSH) 0.9 %
10 SYRINGE (ML) INJECTION EVERY 12 HOURS SCHEDULED
Status: DISCONTINUED | OUTPATIENT
Start: 2018-03-26 | End: 2018-03-26 | Stop reason: HOSPADM

## 2018-03-26 RX ORDER — SODIUM CHLORIDE 0.9 % (FLUSH) 0.9 %
10 SYRINGE (ML) INJECTION EVERY 12 HOURS SCHEDULED
Status: DISCONTINUED | OUTPATIENT
Start: 2018-03-26 | End: 2018-03-30 | Stop reason: HOSPADM

## 2018-03-26 RX ORDER — LABETALOL HYDROCHLORIDE 5 MG/ML
INJECTION, SOLUTION INTRAVENOUS PRN
Status: DISCONTINUED | OUTPATIENT
Start: 2018-03-26 | End: 2018-03-26 | Stop reason: SDUPTHER

## 2018-03-26 RX ORDER — ERGOCALCIFEROL (VITAMIN D2) 10 MCG
50000 TABLET ORAL 2 TIMES DAILY
Status: DISCONTINUED | OUTPATIENT
Start: 2018-03-26 | End: 2018-03-26

## 2018-03-26 RX ORDER — ROPINIROLE 1 MG/1
1 TABLET, FILM COATED ORAL 3 TIMES DAILY
Status: DISCONTINUED | OUTPATIENT
Start: 2018-03-26 | End: 2018-03-30 | Stop reason: HOSPADM

## 2018-03-26 RX ORDER — POTASSIUM CHLORIDE 750 MG/1
10 TABLET, EXTENDED RELEASE ORAL DAILY
Status: DISCONTINUED | OUTPATIENT
Start: 2018-03-26 | End: 2018-03-30 | Stop reason: HOSPADM

## 2018-03-26 RX ORDER — ONDANSETRON 2 MG/ML
4 INJECTION INTRAMUSCULAR; INTRAVENOUS EVERY 6 HOURS PRN
Status: DISCONTINUED | OUTPATIENT
Start: 2018-03-26 | End: 2018-03-30 | Stop reason: HOSPADM

## 2018-03-26 RX ADMIN — LABETALOL HYDROCHLORIDE 10 MG: 5 INJECTION, SOLUTION INTRAVENOUS at 12:36

## 2018-03-26 RX ADMIN — FENTANYL CITRATE 25 MCG: 50 INJECTION, SOLUTION INTRAMUSCULAR; INTRAVENOUS at 15:23

## 2018-03-26 RX ADMIN — BUMETANIDE 1 MG: 1 TABLET ORAL at 17:24

## 2018-03-26 RX ADMIN — HYDRALAZINE HYDROCHLORIDE 5 MG: 20 INJECTION INTRAMUSCULAR; INTRAVENOUS at 15:55

## 2018-03-26 RX ADMIN — SODIUM CHLORIDE: 9 INJECTION, SOLUTION INTRAVENOUS at 17:05

## 2018-03-26 RX ADMIN — CLINDAMYCIN PHOSPHATE 900 MG: 900 INJECTION INTRAVENOUS at 17:25

## 2018-03-26 RX ADMIN — DEXAMETHASONE SODIUM PHOSPHATE 10 MG: 10 INJECTION INTRAMUSCULAR; INTRAVENOUS at 12:39

## 2018-03-26 RX ADMIN — FENTANYL CITRATE 50 MCG: 50 INJECTION, SOLUTION INTRAMUSCULAR; INTRAVENOUS at 12:34

## 2018-03-26 RX ADMIN — SUGAMMADEX 250 MG: 100 INJECTION, SOLUTION INTRAVENOUS at 15:04

## 2018-03-26 RX ADMIN — CELECOXIB 200 MG: 200 CAPSULE ORAL at 10:41

## 2018-03-26 RX ADMIN — VECURONIUM BROMIDE FOR INJECTION 1 MG: 1 INJECTION, POWDER, LYOPHILIZED, FOR SOLUTION INTRAVENOUS at 13:50

## 2018-03-26 RX ADMIN — PHENYLEPHRINE HYDROCHLORIDE 50 MCG: 10 INJECTION INTRAVENOUS at 14:54

## 2018-03-26 RX ADMIN — DOCUSATE SODIUM 100 MG: 100 CAPSULE, LIQUID FILLED ORAL at 20:04

## 2018-03-26 RX ADMIN — Medication 2 G: at 20:05

## 2018-03-26 RX ADMIN — FENTANYL CITRATE 50 MCG: 50 INJECTION, SOLUTION INTRAMUSCULAR; INTRAVENOUS at 15:15

## 2018-03-26 RX ADMIN — SODIUM CHLORIDE, POTASSIUM CHLORIDE, SODIUM LACTATE AND CALCIUM CHLORIDE: 600; 310; 30; 20 INJECTION, SOLUTION INTRAVENOUS at 10:41

## 2018-03-26 RX ADMIN — LEVOTHYROXINE SODIUM 100 MCG: 100 TABLET ORAL at 17:24

## 2018-03-26 RX ADMIN — ROCURONIUM BROMIDE 10 MG: 10 INJECTION INTRAVENOUS at 12:34

## 2018-03-26 RX ADMIN — MORPHINE SULFATE 30 MG: 15 TABLET, FILM COATED, EXTENDED RELEASE ORAL at 20:05

## 2018-03-26 RX ADMIN — PROPOFOL 50 MG: 10 INJECTION, EMULSION INTRAVENOUS at 12:36

## 2018-03-26 RX ADMIN — ERGOCALCIFEROL 50000 UNITS: 1.25 CAPSULE ORAL at 20:04

## 2018-03-26 RX ADMIN — SUCRALFATE 1 G: 1 TABLET ORAL at 17:24

## 2018-03-26 RX ADMIN — VECURONIUM BROMIDE FOR INJECTION 1 MG: 1 INJECTION, POWDER, LYOPHILIZED, FOR SOLUTION INTRAVENOUS at 13:43

## 2018-03-26 RX ADMIN — SODIUM CHLORIDE, SODIUM LACTATE, POTASSIUM CHLORIDE, AND CALCIUM CHLORIDE: 600; 310; 30; 20 INJECTION, SOLUTION INTRAVENOUS at 12:29

## 2018-03-26 RX ADMIN — Medication 2 G: at 12:39

## 2018-03-26 RX ADMIN — VECURONIUM BROMIDE FOR INJECTION 2 MG: 1 INJECTION, POWDER, LYOPHILIZED, FOR SOLUTION INTRAVENOUS at 13:05

## 2018-03-26 RX ADMIN — ROPINIROLE HYDROCHLORIDE 1 MG: 1 TABLET, FILM COATED ORAL at 17:24

## 2018-03-26 RX ADMIN — ROCURONIUM BROMIDE 40 MG: 10 INJECTION INTRAVENOUS at 12:40

## 2018-03-26 RX ADMIN — ALBUMIN (HUMAN) 12.5 G: 2.5 SOLUTION INTRAVENOUS at 14:20

## 2018-03-26 RX ADMIN — Medication 100 MG: at 12:34

## 2018-03-26 RX ADMIN — MIDAZOLAM 2 MG: 1 INJECTION INTRAMUSCULAR; INTRAVENOUS at 12:02

## 2018-03-26 RX ADMIN — MORPHINE SULFATE 4 MG: 4 INJECTION INTRAVENOUS at 15:09

## 2018-03-26 RX ADMIN — FENTANYL CITRATE 50 MCG: 50 INJECTION, SOLUTION INTRAMUSCULAR; INTRAVENOUS at 12:33

## 2018-03-26 RX ADMIN — PROPOFOL 30 MG: 10 INJECTION, EMULSION INTRAVENOUS at 13:15

## 2018-03-26 RX ADMIN — MORPHINE SULFATE 4 MG: 4 INJECTION INTRAVENOUS at 15:16

## 2018-03-26 RX ADMIN — ONDANSETRON HYDROCHLORIDE 4 MG: 2 SOLUTION INTRAMUSCULAR; INTRAVENOUS at 15:01

## 2018-03-26 RX ADMIN — PHENYLEPHRINE HYDROCHLORIDE 50 MCG: 10 INJECTION INTRAVENOUS at 15:02

## 2018-03-26 RX ADMIN — LIDOCAINE HYDROCHLORIDE 50 MG: 10 INJECTION, SOLUTION INFILTRATION; PERINEURAL at 12:34

## 2018-03-26 RX ADMIN — FENTANYL CITRATE 50 MCG: 50 INJECTION, SOLUTION INTRAMUSCULAR; INTRAVENOUS at 13:14

## 2018-03-26 RX ADMIN — MORPHINE SULFATE 2 MG: 4 INJECTION INTRAVENOUS at 15:22

## 2018-03-26 RX ADMIN — FENTANYL CITRATE 25 MCG: 50 INJECTION, SOLUTION INTRAMUSCULAR; INTRAVENOUS at 15:09

## 2018-03-26 RX ADMIN — PROPOFOL 200 MG: 10 INJECTION, EMULSION INTRAVENOUS at 12:34

## 2018-03-26 RX ADMIN — HYDROMORPHONE HYDROCHLORIDE 4 MG: 2 TABLET ORAL at 17:25

## 2018-03-26 RX ADMIN — ESCITALOPRAM OXALATE 10 MG: 10 TABLET ORAL at 17:24

## 2018-03-26 RX ADMIN — PANTOPRAZOLE SODIUM 40 MG: 40 TABLET, DELAYED RELEASE ORAL at 17:25

## 2018-03-26 RX ADMIN — MIDAZOLAM HYDROCHLORIDE 2 MG: 1 INJECTION INTRAMUSCULAR; INTRAVENOUS at 12:02

## 2018-03-26 RX ADMIN — ACETAMINOPHEN 1000 MG: 500 TABLET ORAL at 17:25

## 2018-03-26 RX ADMIN — PREGABALIN 75 MG: 75 CAPSULE ORAL at 10:41

## 2018-03-26 RX ADMIN — VECURONIUM BROMIDE FOR INJECTION 1 MG: 1 INJECTION, POWDER, LYOPHILIZED, FOR SOLUTION INTRAVENOUS at 14:42

## 2018-03-26 RX ADMIN — POTASSIUM CHLORIDE 10 MEQ: 10 TABLET, EXTENDED RELEASE ORAL at 17:24

## 2018-03-26 RX ADMIN — SODIUM CHLORIDE, SODIUM LACTATE, POTASSIUM CHLORIDE, AND CALCIUM CHLORIDE: 600; 310; 30; 20 INJECTION, SOLUTION INTRAVENOUS at 13:43

## 2018-03-26 RX ADMIN — PHENYLEPHRINE HYDROCHLORIDE 100 MCG: 10 INJECTION INTRAVENOUS at 14:58

## 2018-03-26 RX ADMIN — MOMETASONE FUROATE AND FORMOTEROL FUMARATE DIHYDRATE 2 PUFF: 100; 5 AEROSOL RESPIRATORY (INHALATION) at 20:05

## 2018-03-26 RX ADMIN — FAMOTIDINE 20 MG: 20 TABLET, FILM COATED ORAL at 17:25

## 2018-03-26 RX ADMIN — WARFARIN SODIUM 10 MG: 5 TABLET ORAL at 20:04

## 2018-03-26 ASSESSMENT — ENCOUNTER SYMPTOMS: SHORTNESS OF BREATH: 1

## 2018-03-26 ASSESSMENT — PAIN SCALES - GENERAL
PAINLEVEL_OUTOF10: 0
PAINLEVEL_OUTOF10: 0
PAINLEVEL_OUTOF10: 10
PAINLEVEL_OUTOF10: 0
PAINLEVEL_OUTOF10: 9
PAINLEVEL_OUTOF10: 4
PAINLEVEL_OUTOF10: 0
PAINLEVEL_OUTOF10: 0

## 2018-03-26 ASSESSMENT — PAIN - FUNCTIONAL ASSESSMENT: PAIN_FUNCTIONAL_ASSESSMENT: 0-10

## 2018-03-26 NOTE — ANESTHESIA PRE PROCEDURE
Department of Anesthesiology  Preprocedure Note       Name:  Taylor Romo   Age:  75 y.o.  :  1950                                          MRN:  [id number]         Date:  3/26/2018      Surgeon: Tereso Kapoor):  Titus Bryant MD    Procedure: Procedure(s):  TOTAL KNEE REPLACEMENT COMPLEX PRIMARY    Medications prior to admission:   Prior to Admission medications    Medication Sig Start Date End Date Taking? Authorizing Provider   lisinopril (PRINIVIL;ZESTRIL) 5 MG tablet Take 4 tablets by mouth daily 3/23/18  Yes Radhames Peña MD   bumetanide (BUMEX) 1 MG tablet Take 1 mg by mouth daily   Yes Historical Provider, MD   Multiple Vitamins-Minerals (THERAPEUTIC MULTIVITAMIN-MINERALS) tablet Take 1 tablet by mouth daily   Yes Historical Provider, MD   rOPINIRole (REQUIP) 1 MG tablet Take 1 mg by mouth 3 times daily   Yes Historical Provider, MD   Gabapentin, Once-Daily, 300 MG TABS Take 300 mg by mouth 2 times daily as needed (leg pain). 18 Yes Radhames Peña MD   warfarin (COUMADIN) 5 MG tablet Take 2 tablets by mouth daily Take two tablets daily 18  Yes Radhames Peña MD   clobetasol (TEMOVATE) 0.05 % cream Apply topically 2 times daily.  18  Yes Radhames Peña MD   ondansetron (ZOFRAN) 4 MG tablet Take 1 tablet by mouth every 8 hours as needed for Nausea or Vomiting 18  Yes Radhames Peña MD   pantoprazole (PROTONIX) 40 MG tablet Take 1 tablet by mouth daily 18  Yes Radhames Peña MD   Ergocalciferol (VITAMIN D2 PO) Take 50,000 Units by mouth   Yes Historical Provider, MD   sucralfate (CARAFATE) 1 GM/10ML suspension Take 1 g by mouth 4 times daily   Yes Historical Provider, MD   escitalopram (LEXAPRO) 10 MG tablet  17  Yes Historical Provider, MD   ranitidine (ZANTAC) 150 MG tablet  17  Yes Historical Provider, MD   levothyroxine (SYNTHROID) 100 MCG tablet Take by mouth   Yes Historical Provider, MD   potassium chloride (KLOR-CON M) 10 MEQ extended release Social History:    Social History   Substance Use Topics    Smoking status: Never Smoker    Smokeless tobacco: Never Used    Alcohol use No                                Counseling given: Not Answered      Vital Signs (Current):   Vitals:    03/26/18 1046   BP: (!) 161/91   Pulse: 63   Resp: 16   Temp: 97.1 °F (36.2 °C)   TempSrc: Tympanic   SpO2: 97%   Weight: 236 lb (107 kg)   Height: 5' 7\" (1.702 m)                                              BP Readings from Last 3 Encounters:   03/26/18 (!) 161/91   03/23/18 (!) 180/98   03/06/18 (!) 160/94       NPO Status: Time of last liquid consumption: 2100                        Time of last solid consumption: 2100                        Date of last liquid consumption: 03/25/18                        Date of last solid food consumption: 03/25/18    BMI:   Wt Readings from Last 3 Encounters:   03/26/18 236 lb (107 kg)   03/23/18 235 lb (106.6 kg)   03/09/18 236 lb (107 kg)     Body mass index is 36.96 kg/m². CBC:   Lab Results   Component Value Date    WBC 7.3 03/09/2018    RBC 4.25 03/09/2018    HGB 12.1 03/09/2018    HCT 38.6 03/09/2018    MCV 90.8 03/09/2018    RDW 15.7 03/09/2018     03/09/2018       CMP:   Lab Results   Component Value Date     03/09/2018    K 3.6 03/09/2018     03/09/2018    CO2 23 03/09/2018    BUN 14 03/09/2018    CREATININE 0.8 03/09/2018    LABGLOM >60 03/09/2018    GLUCOSE 80 03/09/2018    PROT 8.1 03/09/2018    PROT 7.7 09/06/2012    CALCIUM 9.3 03/09/2018    BILITOT 0.5 03/09/2018    ALKPHOS 84 03/09/2018    AST 25 03/09/2018    ALT 10 03/09/2018       POC Tests: No results for input(s): POCGLU, POCNA, POCK, POCCL, POCBUN, POCHEMO, POCHCT in the last 72 hours.     Coags:   Lab Results   Component Value Date    PROTIME 18.2 03/09/2018    INR 2.3 03/21/2018    INR 1.51 03/09/2018    APTT 24.0 03/09/2018       HCG (If Applicable): No results found for: PREGTESTUR, PREGSERUM, HCG, HCGQUANT     ABGs: No results found for: PHART, PO2ART, HYD5DQF, NWT3KOY, BEART, C4UECZUZ     Type & Screen (If Applicable):  No results found for: LABABO, LABRH    Anesthesia Evaluation    Airway: Mallampati: II  TM distance: >3 FB   Neck ROM: full  Mouth opening: > = 3 FB Dental: normal exam         Pulmonary:normal exam  breath sounds clear to auscultation  (+) shortness of breath:  sleep apnea:                             Cardiovascular:    (+) hypertension:, angina:, pacemaker:,       ECG reviewed  Rhythm: regular  Rate: normal                 ROS comment: Tonia scan.     Comment: The patient was injected with 10.7 mCi of technetium 99  sestamibi. Following the Tonia scan infusion she was reinjected with  32.7 mCi of technetium 99 sestamibi. Repeat images were obtained  following standard protocol revealing the followin. Analysis of stress and rest images reveals no evidence of ischemia  or infarction. 2. Analysis of gated images reveals that the left ventricular ejection  fraction is 59 percent at rest.        Impression         Neuro/Psych:   (+) neuromuscular disease:, depression/anxiety             GI/Hepatic/Renal:   (+) GERD:, PUD, renal disease (resolved (due to hypotension )): CRI and ARF,           Endo/Other:    (+) DiabetesType II DM, , hypothyroidism, blood dyscrasia: anticoagulation therapy:., .                 Abdominal:       Abdomen: soft. Vascular:   + DVT ([place] filter), . Anesthesia Plan      general and regional     ASA 4     (Iv zofran within 30 min of closing   No sab on anticoag)  Induction: intravenous. BIS  MIPS: Postoperative opioids intended and Prophylactic antiemetics administered. Anesthetic plan and risks discussed with patient. Use of blood products discussed with patient whom. Plan discussed with CRNA.     Attending anesthesiologist reviewed and agrees with Pre Eval content            Wesley Browning MD   3/26/2018

## 2018-03-27 PROBLEM — J98.4 RESTRICTIVE AIRWAY DISEASE: Status: ACTIVE | Noted: 2018-03-27

## 2018-03-27 PROBLEM — R73.9 HYPERGLYCEMIA: Status: ACTIVE | Noted: 2018-03-26

## 2018-03-27 LAB
ALBUMIN SERPL-MCNC: 3.2 G/DL (ref 3.5–5.2)
ALP BLD-CCNC: 66 U/L (ref 35–104)
ALT SERPL-CCNC: 14 U/L (ref 5–33)
ANION GAP SERPL CALCULATED.3IONS-SCNC: 15 MMOL/L (ref 7–19)
AST SERPL-CCNC: 39 U/L (ref 5–32)
BILIRUB SERPL-MCNC: 0.5 MG/DL (ref 0.2–1.2)
BUN BLDV-MCNC: 19 MG/DL (ref 8–23)
CALCIUM SERPL-MCNC: 8.3 MG/DL (ref 8.8–10.2)
CHLORIDE BLD-SCNC: 98 MMOL/L (ref 98–111)
CO2: 22 MMOL/L (ref 22–29)
CREAT SERPL-MCNC: 1.3 MG/DL (ref 0.5–0.9)
FERRITIN: 60.8 NG/ML (ref 13–150)
GFR NON-AFRICAN AMERICAN: 41
GLUCOSE BLD-MCNC: 103 MG/DL (ref 70–99)
GLUCOSE BLD-MCNC: 107 MG/DL (ref 70–99)
GLUCOSE BLD-MCNC: 115 MG/DL (ref 70–99)
GLUCOSE BLD-MCNC: 121 MG/DL (ref 74–109)
GLUCOSE BLD-MCNC: 128 MG/DL (ref 70–99)
HCT VFR BLD CALC: 30.1 % (ref 37–47)
HEMOGLOBIN: 9.5 G/DL (ref 12–16)
INR BLD: 2.21 (ref 0.88–1.18)
IRON SATURATION: 6 % (ref 14–50)
IRON: 16 UG/DL (ref 37–145)
MCH RBC QN AUTO: 28.1 PG (ref 27–31)
MCHC RBC AUTO-ENTMCNC: 31.6 G/DL (ref 33–37)
MCV RBC AUTO: 89.1 FL (ref 81–99)
PDW BLD-RTO: 16.3 % (ref 11.5–14.5)
PERFORMED ON: ABNORMAL
PLATELET # BLD: 172 K/UL (ref 130–400)
PMV BLD AUTO: 12.3 FL (ref 9.4–12.3)
POTASSIUM REFLEX MAGNESIUM: 4.4 MMOL/L (ref 3.5–5)
POTASSIUM SERPL-SCNC: 4.6 MMOL/L (ref 3.5–5)
PROTHROMBIN TIME: 24.6 SEC (ref 12–14.6)
RBC # BLD: 3.38 M/UL (ref 4.2–5.4)
SODIUM BLD-SCNC: 135 MMOL/L (ref 136–145)
TOTAL IRON BINDING CAPACITY: 281 UG/DL (ref 250–400)
TOTAL PROTEIN: 6.4 G/DL (ref 6.6–8.7)
WBC # BLD: 12.6 K/UL (ref 4.8–10.8)

## 2018-03-27 PROCEDURE — 85027 COMPLETE CBC AUTOMATED: CPT

## 2018-03-27 PROCEDURE — 83540 ASSAY OF IRON: CPT

## 2018-03-27 PROCEDURE — 6370000000 HC RX 637 (ALT 250 FOR IP): Performed by: PHYSICIAN ASSISTANT

## 2018-03-27 PROCEDURE — G8988 SELF CARE GOAL STATUS: HCPCS

## 2018-03-27 PROCEDURE — 82948 REAGENT STRIP/BLOOD GLUCOSE: CPT

## 2018-03-27 PROCEDURE — 2580000003 HC RX 258: Performed by: ORTHOPAEDIC SURGERY

## 2018-03-27 PROCEDURE — 1210000000 HC MED SURG R&B

## 2018-03-27 PROCEDURE — 83550 IRON BINDING TEST: CPT

## 2018-03-27 PROCEDURE — 85610 PROTHROMBIN TIME: CPT

## 2018-03-27 PROCEDURE — G8987 SELF CARE CURRENT STATUS: HCPCS

## 2018-03-27 PROCEDURE — 82728 ASSAY OF FERRITIN: CPT

## 2018-03-27 PROCEDURE — 80053 COMPREHEN METABOLIC PANEL: CPT

## 2018-03-27 PROCEDURE — 36415 COLL VENOUS BLD VENIPUNCTURE: CPT

## 2018-03-27 PROCEDURE — 97165 OT EVAL LOW COMPLEX 30 MIN: CPT

## 2018-03-27 PROCEDURE — 6370000000 HC RX 637 (ALT 250 FOR IP): Performed by: CLINICAL NURSE SPECIALIST

## 2018-03-27 PROCEDURE — 2700000000 HC OXYGEN THERAPY PER DAY

## 2018-03-27 PROCEDURE — 97161 PT EVAL LOW COMPLEX 20 MIN: CPT

## 2018-03-27 PROCEDURE — 2500000003 HC RX 250 WO HCPCS: Performed by: ORTHOPAEDIC SURGERY

## 2018-03-27 PROCEDURE — 6370000000 HC RX 637 (ALT 250 FOR IP): Performed by: ORTHOPAEDIC SURGERY

## 2018-03-27 PROCEDURE — 6360000002 HC RX W HCPCS: Performed by: ORTHOPAEDIC SURGERY

## 2018-03-27 PROCEDURE — G8979 MOBILITY GOAL STATUS: HCPCS

## 2018-03-27 PROCEDURE — G8978 MOBILITY CURRENT STATUS: HCPCS

## 2018-03-27 RX ORDER — WARFARIN SODIUM 5 MG/1
5 TABLET ORAL DAILY
Status: DISCONTINUED | OUTPATIENT
Start: 2018-03-27 | End: 2018-03-28

## 2018-03-27 RX ORDER — METOCLOPRAMIDE HYDROCHLORIDE 5 MG/ML
10 INJECTION INTRAMUSCULAR; INTRAVENOUS EVERY 6 HOURS PRN
Status: DISCONTINUED | OUTPATIENT
Start: 2018-03-27 | End: 2018-03-30 | Stop reason: HOSPADM

## 2018-03-27 RX ORDER — WARFARIN SODIUM 5 MG/1
10 TABLET ORAL DAILY
Status: DISCONTINUED | OUTPATIENT
Start: 2018-03-29 | End: 2018-03-28

## 2018-03-27 RX ORDER — WARFARIN SODIUM 5 MG/1
5 TABLET ORAL DAILY
Status: DISCONTINUED | OUTPATIENT
Start: 2018-03-27 | End: 2018-03-27

## 2018-03-27 RX ADMIN — LEVOTHYROXINE SODIUM 100 MCG: 100 TABLET ORAL at 05:05

## 2018-03-27 RX ADMIN — MORPHINE SULFATE 30 MG: 15 TABLET, FILM COATED, EXTENDED RELEASE ORAL at 21:06

## 2018-03-27 RX ADMIN — DOCUSATE SODIUM 100 MG: 100 CAPSULE, LIQUID FILLED ORAL at 08:42

## 2018-03-27 RX ADMIN — FAMOTIDINE 20 MG: 20 TABLET, FILM COATED ORAL at 08:42

## 2018-03-27 RX ADMIN — CLINDAMYCIN PHOSPHATE 900 MG: 900 INJECTION INTRAVENOUS at 00:39

## 2018-03-27 RX ADMIN — Medication 2 G: at 05:04

## 2018-03-27 RX ADMIN — ROPINIROLE HYDROCHLORIDE 1 MG: 1 TABLET, FILM COATED ORAL at 08:42

## 2018-03-27 RX ADMIN — SUCRALFATE 1 G: 1 TABLET ORAL at 00:39

## 2018-03-27 RX ADMIN — CLINDAMYCIN PHOSPHATE 900 MG: 900 INJECTION INTRAVENOUS at 08:43

## 2018-03-27 RX ADMIN — HYDROMORPHONE HYDROCHLORIDE 2 MG: 2 TABLET ORAL at 18:23

## 2018-03-27 RX ADMIN — ACETAMINOPHEN 1000 MG: 500 TABLET ORAL at 18:23

## 2018-03-27 RX ADMIN — PANTOPRAZOLE SODIUM 40 MG: 40 TABLET, DELAYED RELEASE ORAL at 08:43

## 2018-03-27 RX ADMIN — ESCITALOPRAM OXALATE 10 MG: 10 TABLET ORAL at 08:43

## 2018-03-27 RX ADMIN — ACETAMINOPHEN 1000 MG: 500 TABLET ORAL at 08:42

## 2018-03-27 RX ADMIN — SUCRALFATE 1 G: 1 TABLET ORAL at 05:05

## 2018-03-27 RX ADMIN — MORPHINE SULFATE 30 MG: 15 TABLET, FILM COATED, EXTENDED RELEASE ORAL at 08:42

## 2018-03-27 RX ADMIN — WARFARIN SODIUM 5 MG: 5 TABLET ORAL at 18:24

## 2018-03-27 RX ADMIN — MOMETASONE FUROATE AND FORMOTEROL FUMARATE DIHYDRATE 2 PUFF: 100; 5 AEROSOL RESPIRATORY (INHALATION) at 08:43

## 2018-03-27 RX ADMIN — ERGOCALCIFEROL 50000 UNITS: 1.25 CAPSULE ORAL at 08:42

## 2018-03-27 RX ADMIN — CLINDAMYCIN PHOSPHATE 900 MG: 900 INJECTION INTRAVENOUS at 18:24

## 2018-03-27 RX ADMIN — SODIUM CHLORIDE: 9 INJECTION, SOLUTION INTRAVENOUS at 05:05

## 2018-03-27 RX ADMIN — MOMETASONE FUROATE AND FORMOTEROL FUMARATE DIHYDRATE 2 PUFF: 100; 5 AEROSOL RESPIRATORY (INHALATION) at 21:06

## 2018-03-27 RX ADMIN — BUMETANIDE 1 MG: 1 TABLET ORAL at 08:42

## 2018-03-27 RX ADMIN — SUCRALFATE 1 G: 1 TABLET ORAL at 18:24

## 2018-03-27 RX ADMIN — ACETAMINOPHEN 1000 MG: 500 TABLET ORAL at 00:39

## 2018-03-27 RX ADMIN — ONDANSETRON 4 MG: 2 INJECTION INTRAMUSCULAR; INTRAVENOUS at 07:52

## 2018-03-27 RX ADMIN — DOCUSATE SODIUM 100 MG: 100 CAPSULE, LIQUID FILLED ORAL at 21:06

## 2018-03-27 RX ADMIN — POTASSIUM CHLORIDE 10 MEQ: 10 TABLET, EXTENDED RELEASE ORAL at 08:43

## 2018-03-27 RX ADMIN — HYDROMORPHONE HYDROCHLORIDE 2 MG: 2 TABLET ORAL at 05:05

## 2018-03-27 RX ADMIN — ROPINIROLE HYDROCHLORIDE 1 MG: 1 TABLET, FILM COATED ORAL at 21:06

## 2018-03-27 ASSESSMENT — PAIN SCALES - GENERAL
PAINLEVEL_OUTOF10: 4
PAINLEVEL_OUTOF10: 5
PAINLEVEL_OUTOF10: 7
PAINLEVEL_OUTOF10: 3
PAINLEVEL_OUTOF10: 4
PAINLEVEL_OUTOF10: 5
PAINLEVEL_OUTOF10: 3
PAINLEVEL_OUTOF10: 4

## 2018-03-27 ASSESSMENT — PAIN DESCRIPTION - DESCRIPTORS: DESCRIPTORS: ACHING

## 2018-03-27 ASSESSMENT — PAIN DESCRIPTION - LOCATION: LOCATION: KNEE

## 2018-03-27 ASSESSMENT — PAIN DESCRIPTION - PAIN TYPE: TYPE: SURGICAL PAIN

## 2018-03-27 ASSESSMENT — PAIN DESCRIPTION - ORIENTATION: ORIENTATION: RIGHT

## 2018-03-28 LAB
ALBUMIN SERPL-MCNC: 3.2 G/DL (ref 3.5–5.2)
ALP BLD-CCNC: 67 U/L (ref 35–104)
ALT SERPL-CCNC: 7 U/L (ref 5–33)
ANION GAP SERPL CALCULATED.3IONS-SCNC: 12 MMOL/L (ref 7–19)
AST SERPL-CCNC: 31 U/L (ref 5–32)
BILIRUB SERPL-MCNC: 0.3 MG/DL (ref 0.2–1.2)
BUN BLDV-MCNC: 23 MG/DL (ref 8–23)
CALCIUM SERPL-MCNC: 8.1 MG/DL (ref 8.8–10.2)
CHLORIDE BLD-SCNC: 99 MMOL/L (ref 98–111)
CO2: 22 MMOL/L (ref 22–29)
CREAT SERPL-MCNC: 1.5 MG/DL (ref 0.5–0.9)
GFR NON-AFRICAN AMERICAN: 34
GLUCOSE BLD-MCNC: 103 MG/DL (ref 70–99)
GLUCOSE BLD-MCNC: 111 MG/DL (ref 70–99)
GLUCOSE BLD-MCNC: 89 MG/DL (ref 70–99)
GLUCOSE BLD-MCNC: 92 MG/DL (ref 74–109)
HCT VFR BLD CALC: 26.2 % (ref 37–47)
HEMOGLOBIN: 8.3 G/DL (ref 12–16)
INR BLD: 4.12 (ref 0.88–1.18)
IRON SATURATION: 7 % (ref 14–50)
IRON: 17 UG/DL (ref 37–145)
MCH RBC QN AUTO: 28.8 PG (ref 27–31)
MCHC RBC AUTO-ENTMCNC: 31.7 G/DL (ref 33–37)
MCV RBC AUTO: 91 FL (ref 81–99)
PDW BLD-RTO: 16.5 % (ref 11.5–14.5)
PERFORMED ON: ABNORMAL
PERFORMED ON: ABNORMAL
PERFORMED ON: NORMAL
PLATELET # BLD: 166 K/UL (ref 130–400)
PMV BLD AUTO: 11.9 FL (ref 9.4–12.3)
POTASSIUM SERPL-SCNC: 3.8 MMOL/L (ref 3.5–5)
PROTHROMBIN TIME: 40.3 SEC (ref 12–14.6)
RBC # BLD: 2.88 M/UL (ref 4.2–5.4)
SODIUM BLD-SCNC: 133 MMOL/L (ref 136–145)
TOTAL IRON BINDING CAPACITY: 256 UG/DL (ref 250–400)
TOTAL PROTEIN: 6.4 G/DL (ref 6.6–8.7)
WBC # BLD: 7.2 K/UL (ref 4.8–10.8)

## 2018-03-28 PROCEDURE — 1210000000 HC MED SURG R&B

## 2018-03-28 PROCEDURE — 51702 INSERT TEMP BLADDER CATH: CPT

## 2018-03-28 PROCEDURE — 97116 GAIT TRAINING THERAPY: CPT

## 2018-03-28 PROCEDURE — 85027 COMPLETE CBC AUTOMATED: CPT

## 2018-03-28 PROCEDURE — 83540 ASSAY OF IRON: CPT

## 2018-03-28 PROCEDURE — 6370000000 HC RX 637 (ALT 250 FOR IP): Performed by: PHYSICIAN ASSISTANT

## 2018-03-28 PROCEDURE — 83550 IRON BINDING TEST: CPT

## 2018-03-28 PROCEDURE — 85610 PROTHROMBIN TIME: CPT

## 2018-03-28 PROCEDURE — 6370000000 HC RX 637 (ALT 250 FOR IP): Performed by: ORTHOPAEDIC SURGERY

## 2018-03-28 PROCEDURE — 6360000002 HC RX W HCPCS: Performed by: ORTHOPAEDIC SURGERY

## 2018-03-28 PROCEDURE — 36415 COLL VENOUS BLD VENIPUNCTURE: CPT

## 2018-03-28 PROCEDURE — 6370000000 HC RX 637 (ALT 250 FOR IP): Performed by: CLINICAL NURSE SPECIALIST

## 2018-03-28 PROCEDURE — 80053 COMPREHEN METABOLIC PANEL: CPT

## 2018-03-28 PROCEDURE — 51798 US URINE CAPACITY MEASURE: CPT

## 2018-03-28 PROCEDURE — 2580000003 HC RX 258: Performed by: PHYSICIAN ASSISTANT

## 2018-03-28 PROCEDURE — 2580000003 HC RX 258: Performed by: ORTHOPAEDIC SURGERY

## 2018-03-28 PROCEDURE — 2500000003 HC RX 250 WO HCPCS: Performed by: ORTHOPAEDIC SURGERY

## 2018-03-28 PROCEDURE — 51701 INSERT BLADDER CATHETER: CPT

## 2018-03-28 PROCEDURE — 82948 REAGENT STRIP/BLOOD GLUCOSE: CPT

## 2018-03-28 RX ORDER — IRON POLYSACCHARIDE COMPLEX 150 MG
150 CAPSULE ORAL DAILY
Status: DISCONTINUED | OUTPATIENT
Start: 2018-03-28 | End: 2018-03-30 | Stop reason: HOSPADM

## 2018-03-28 RX ORDER — 0.9 % SODIUM CHLORIDE 0.9 %
500 INTRAVENOUS SOLUTION INTRAVENOUS ONCE
Status: COMPLETED | OUTPATIENT
Start: 2018-03-28 | End: 2018-03-28

## 2018-03-28 RX ORDER — BUMETANIDE 1 MG/1
0.5 TABLET ORAL DAILY
Status: DISCONTINUED | OUTPATIENT
Start: 2018-03-28 | End: 2018-03-28

## 2018-03-28 RX ORDER — WARFARIN SODIUM 5 MG/1
5 TABLET ORAL DAILY
Status: DISCONTINUED | OUTPATIENT
Start: 2018-03-29 | End: 2018-03-29

## 2018-03-28 RX ADMIN — CLINDAMYCIN PHOSPHATE 900 MG: 900 INJECTION INTRAVENOUS at 00:23

## 2018-03-28 RX ADMIN — CLINDAMYCIN PHOSPHATE 900 MG: 900 INJECTION INTRAVENOUS at 08:37

## 2018-03-28 RX ADMIN — DOCUSATE SODIUM 100 MG: 100 CAPSULE, LIQUID FILLED ORAL at 08:37

## 2018-03-28 RX ADMIN — Medication 150 MG: at 08:52

## 2018-03-28 RX ADMIN — SUCRALFATE 1 G: 1 TABLET ORAL at 12:31

## 2018-03-28 RX ADMIN — FAMOTIDINE 20 MG: 20 TABLET, FILM COATED ORAL at 08:37

## 2018-03-28 RX ADMIN — LEVOTHYROXINE SODIUM 100 MCG: 100 TABLET ORAL at 05:19

## 2018-03-28 RX ADMIN — SODIUM CHLORIDE 500 ML: 9 INJECTION, SOLUTION INTRAVENOUS at 08:36

## 2018-03-28 RX ADMIN — SUCRALFATE 1 G: 1 TABLET ORAL at 05:19

## 2018-03-28 RX ADMIN — ESCITALOPRAM OXALATE 10 MG: 10 TABLET ORAL at 08:37

## 2018-03-28 RX ADMIN — DOCUSATE SODIUM 100 MG: 100 CAPSULE, LIQUID FILLED ORAL at 21:33

## 2018-03-28 RX ADMIN — ACETAMINOPHEN 1000 MG: 500 TABLET ORAL at 08:37

## 2018-03-28 RX ADMIN — SUCRALFATE 1 G: 1 TABLET ORAL at 18:04

## 2018-03-28 RX ADMIN — POTASSIUM CHLORIDE 10 MEQ: 10 TABLET, EXTENDED RELEASE ORAL at 08:37

## 2018-03-28 RX ADMIN — ROPINIROLE HYDROCHLORIDE 1 MG: 1 TABLET, FILM COATED ORAL at 08:37

## 2018-03-28 RX ADMIN — Medication 10 ML: at 08:38

## 2018-03-28 RX ADMIN — ROPINIROLE HYDROCHLORIDE 1 MG: 1 TABLET, FILM COATED ORAL at 21:33

## 2018-03-28 RX ADMIN — MORPHINE SULFATE 30 MG: 15 TABLET, FILM COATED, EXTENDED RELEASE ORAL at 21:33

## 2018-03-28 RX ADMIN — MORPHINE SULFATE 30 MG: 15 TABLET, FILM COATED, EXTENDED RELEASE ORAL at 08:37

## 2018-03-28 RX ADMIN — ONDANSETRON HYDROCHLORIDE 4 MG: 4 TABLET, FILM COATED ORAL at 08:37

## 2018-03-28 RX ADMIN — PANTOPRAZOLE SODIUM 40 MG: 40 TABLET, DELAYED RELEASE ORAL at 08:37

## 2018-03-28 RX ADMIN — ACETAMINOPHEN 1000 MG: 500 TABLET ORAL at 00:23

## 2018-03-28 RX ADMIN — ACETAMINOPHEN 1000 MG: 500 TABLET ORAL at 18:04

## 2018-03-28 RX ADMIN — MOMETASONE FUROATE AND FORMOTEROL FUMARATE DIHYDRATE 2 PUFF: 100; 5 AEROSOL RESPIRATORY (INHALATION) at 08:36

## 2018-03-28 RX ADMIN — ERGOCALCIFEROL 50000 UNITS: 1.25 CAPSULE ORAL at 08:37

## 2018-03-28 RX ADMIN — MOMETASONE FUROATE AND FORMOTEROL FUMARATE DIHYDRATE 2 PUFF: 100; 5 AEROSOL RESPIRATORY (INHALATION) at 21:36

## 2018-03-28 RX ADMIN — BUMETANIDE 0.5 MG: 1 TABLET ORAL at 08:51

## 2018-03-28 RX ADMIN — SUCRALFATE 1 G: 1 TABLET ORAL at 00:23

## 2018-03-28 ASSESSMENT — PAIN SCALES - GENERAL
PAINLEVEL_OUTOF10: 4
PAINLEVEL_OUTOF10: 3
PAINLEVEL_OUTOF10: 5
PAINLEVEL_OUTOF10: 6
PAINLEVEL_OUTOF10: 5

## 2018-03-28 ASSESSMENT — PAIN DESCRIPTION - LOCATION: LOCATION: KNEE

## 2018-03-28 ASSESSMENT — PAIN DESCRIPTION - PAIN TYPE: TYPE: SURGICAL PAIN

## 2018-03-28 ASSESSMENT — PAIN DESCRIPTION - ORIENTATION: ORIENTATION: RIGHT

## 2018-03-29 LAB
ALBUMIN SERPL-MCNC: 3 G/DL (ref 3.5–5.2)
ALP BLD-CCNC: 72 U/L (ref 35–104)
ALT SERPL-CCNC: 7 U/L (ref 5–33)
AST SERPL-CCNC: 34 U/L (ref 5–32)
BILIRUB SERPL-MCNC: 0.4 MG/DL (ref 0.2–1.2)
GLUCOSE BLD-MCNC: 107 MG/DL (ref 70–99)
GLUCOSE BLD-MCNC: 89 MG/DL (ref 70–99)
GLUCOSE BLD-MCNC: 90 MG/DL (ref 70–99)
GLUCOSE BLD-MCNC: 95 MG/DL (ref 70–99)
HCT VFR BLD CALC: 26.5 % (ref 37–47)
HEMOGLOBIN: 8.2 G/DL (ref 12–16)
INR BLD: 4.09 (ref 0.88–1.18)
MCH RBC QN AUTO: 28.4 PG (ref 27–31)
MCHC RBC AUTO-ENTMCNC: 30.9 G/DL (ref 33–37)
MCV RBC AUTO: 91.7 FL (ref 81–99)
PDW BLD-RTO: 16.7 % (ref 11.5–14.5)
PERFORMED ON: ABNORMAL
PERFORMED ON: NORMAL
PLATELET # BLD: 161 K/UL (ref 130–400)
PMV BLD AUTO: 12.6 FL (ref 9.4–12.3)
POTASSIUM SERPL-SCNC: 3.9 MMOL/L (ref 3.5–5)
PROTHROMBIN TIME: 40.1 SEC (ref 12–14.6)
RBC # BLD: 2.89 M/UL (ref 4.2–5.4)
TOTAL PROTEIN: 6.2 G/DL (ref 6.6–8.7)
WBC # BLD: 8.6 K/UL (ref 4.8–10.8)

## 2018-03-29 PROCEDURE — 85027 COMPLETE CBC AUTOMATED: CPT

## 2018-03-29 PROCEDURE — 6370000000 HC RX 637 (ALT 250 FOR IP): Performed by: ORTHOPAEDIC SURGERY

## 2018-03-29 PROCEDURE — 80053 COMPREHEN METABOLIC PANEL: CPT

## 2018-03-29 PROCEDURE — 6370000000 HC RX 637 (ALT 250 FOR IP): Performed by: CLINICAL NURSE SPECIALIST

## 2018-03-29 PROCEDURE — 97116 GAIT TRAINING THERAPY: CPT

## 2018-03-29 PROCEDURE — 85610 PROTHROMBIN TIME: CPT

## 2018-03-29 PROCEDURE — 6360000002 HC RX W HCPCS: Performed by: ORTHOPAEDIC SURGERY

## 2018-03-29 PROCEDURE — 1210000000 HC MED SURG R&B

## 2018-03-29 PROCEDURE — 97530 THERAPEUTIC ACTIVITIES: CPT

## 2018-03-29 PROCEDURE — 6370000000 HC RX 637 (ALT 250 FOR IP): Performed by: PHYSICIAN ASSISTANT

## 2018-03-29 PROCEDURE — 2580000003 HC RX 258: Performed by: ORTHOPAEDIC SURGERY

## 2018-03-29 PROCEDURE — 36415 COLL VENOUS BLD VENIPUNCTURE: CPT

## 2018-03-29 PROCEDURE — 2580000003 HC RX 258: Performed by: INTERNAL MEDICINE

## 2018-03-29 PROCEDURE — 82948 REAGENT STRIP/BLOOD GLUCOSE: CPT

## 2018-03-29 RX ORDER — PHYTONADIONE 5 MG/1
5 TABLET ORAL ONCE
Status: COMPLETED | OUTPATIENT
Start: 2018-03-29 | End: 2018-03-29

## 2018-03-29 RX ADMIN — PHYTONADIONE 5 MG: 5 TABLET ORAL at 08:00

## 2018-03-29 RX ADMIN — MORPHINE SULFATE 30 MG: 15 TABLET, FILM COATED, EXTENDED RELEASE ORAL at 21:03

## 2018-03-29 RX ADMIN — DOCUSATE SODIUM 100 MG: 100 CAPSULE, LIQUID FILLED ORAL at 09:09

## 2018-03-29 RX ADMIN — ONDANSETRON HYDROCHLORIDE 4 MG: 4 TABLET, FILM COATED ORAL at 10:34

## 2018-03-29 RX ADMIN — ACETAMINOPHEN 1000 MG: 500 TABLET ORAL at 09:09

## 2018-03-29 RX ADMIN — SUCRALFATE 1 G: 1 TABLET ORAL at 06:04

## 2018-03-29 RX ADMIN — ERGOCALCIFEROL 50000 UNITS: 1.25 CAPSULE ORAL at 09:09

## 2018-03-29 RX ADMIN — ACETAMINOPHEN 1000 MG: 500 TABLET ORAL at 00:22

## 2018-03-29 RX ADMIN — ACETAMINOPHEN 1000 MG: 500 TABLET ORAL at 17:33

## 2018-03-29 RX ADMIN — SODIUM CHLORIDE: 9 INJECTION, SOLUTION INTRAVENOUS at 21:03

## 2018-03-29 RX ADMIN — Medication 10 ML: at 09:10

## 2018-03-29 RX ADMIN — Medication 150 MG: at 09:08

## 2018-03-29 RX ADMIN — MOMETASONE FUROATE AND FORMOTEROL FUMARATE DIHYDRATE 2 PUFF: 100; 5 AEROSOL RESPIRATORY (INHALATION) at 21:03

## 2018-03-29 RX ADMIN — ROPINIROLE HYDROCHLORIDE 1 MG: 1 TABLET, FILM COATED ORAL at 21:03

## 2018-03-29 RX ADMIN — ROPINIROLE HYDROCHLORIDE 1 MG: 1 TABLET, FILM COATED ORAL at 09:09

## 2018-03-29 RX ADMIN — ESCITALOPRAM OXALATE 10 MG: 10 TABLET ORAL at 09:09

## 2018-03-29 RX ADMIN — DOCUSATE SODIUM 100 MG: 100 CAPSULE, LIQUID FILLED ORAL at 21:02

## 2018-03-29 RX ADMIN — PANTOPRAZOLE SODIUM 40 MG: 40 TABLET, DELAYED RELEASE ORAL at 09:10

## 2018-03-29 RX ADMIN — SODIUM CHLORIDE: 9 INJECTION, SOLUTION INTRAVENOUS at 10:37

## 2018-03-29 RX ADMIN — SODIUM CHLORIDE: 9 INJECTION, SOLUTION INTRAVENOUS at 00:22

## 2018-03-29 RX ADMIN — SUCRALFATE 1 G: 1 TABLET ORAL at 00:22

## 2018-03-29 RX ADMIN — FAMOTIDINE 20 MG: 20 TABLET, FILM COATED ORAL at 09:09

## 2018-03-29 RX ADMIN — POTASSIUM CHLORIDE 10 MEQ: 10 TABLET, EXTENDED RELEASE ORAL at 09:10

## 2018-03-29 RX ADMIN — SUCRALFATE 1 G: 1 TABLET ORAL at 17:31

## 2018-03-29 RX ADMIN — MORPHINE SULFATE 30 MG: 15 TABLET, FILM COATED, EXTENDED RELEASE ORAL at 09:09

## 2018-03-29 RX ADMIN — ROPINIROLE HYDROCHLORIDE 1 MG: 1 TABLET, FILM COATED ORAL at 14:00

## 2018-03-29 RX ADMIN — SUCRALFATE 1 G: 1 TABLET ORAL at 12:36

## 2018-03-29 RX ADMIN — LEVOTHYROXINE SODIUM 100 MCG: 100 TABLET ORAL at 06:04

## 2018-03-29 ASSESSMENT — PAIN SCALES - GENERAL
PAINLEVEL_OUTOF10: 5
PAINLEVEL_OUTOF10: 3
PAINLEVEL_OUTOF10: 1
PAINLEVEL_OUTOF10: 4
PAINLEVEL_OUTOF10: 1

## 2018-03-29 ASSESSMENT — PAIN DESCRIPTION - PAIN TYPE
TYPE: SURGICAL PAIN
TYPE: SURGICAL PAIN

## 2018-03-29 ASSESSMENT — PAIN DESCRIPTION - LOCATION
LOCATION: KNEE
LOCATION: KNEE

## 2018-03-29 ASSESSMENT — PAIN DESCRIPTION - ORIENTATION: ORIENTATION: RIGHT

## 2018-03-30 VITALS
HEIGHT: 67 IN | OXYGEN SATURATION: 96 % | HEART RATE: 72 BPM | DIASTOLIC BLOOD PRESSURE: 88 MMHG | RESPIRATION RATE: 18 BRPM | BODY MASS INDEX: 37.04 KG/M2 | TEMPERATURE: 97.5 F | WEIGHT: 236 LBS | SYSTOLIC BLOOD PRESSURE: 154 MMHG

## 2018-03-30 LAB
ALBUMIN SERPL-MCNC: 3.1 G/DL (ref 3.5–5.2)
ALP BLD-CCNC: 70 U/L (ref 35–104)
ALT SERPL-CCNC: 7 U/L (ref 5–33)
ANION GAP SERPL CALCULATED.3IONS-SCNC: 14 MMOL/L (ref 7–19)
AST SERPL-CCNC: 29 U/L (ref 5–32)
BILIRUB SERPL-MCNC: 0.6 MG/DL (ref 0.2–1.2)
BUN BLDV-MCNC: 10 MG/DL (ref 8–23)
CALCIUM SERPL-MCNC: 8.3 MG/DL (ref 8.8–10.2)
CHLORIDE BLD-SCNC: 105 MMOL/L (ref 98–111)
CO2: 20 MMOL/L (ref 22–29)
CREAT SERPL-MCNC: 0.7 MG/DL (ref 0.5–0.9)
GFR NON-AFRICAN AMERICAN: >60
GLUCOSE BLD-MCNC: 86 MG/DL (ref 74–109)
GLUCOSE BLD-MCNC: 91 MG/DL (ref 70–99)
HCT VFR BLD CALC: 23.2 % (ref 37–47)
HEMOGLOBIN: 7.4 G/DL (ref 12–16)
INR BLD: 1.91 (ref 0.88–1.18)
MCH RBC QN AUTO: 28.7 PG (ref 27–31)
MCHC RBC AUTO-ENTMCNC: 31.9 G/DL (ref 33–37)
MCV RBC AUTO: 89.9 FL (ref 81–99)
PDW BLD-RTO: 16.7 % (ref 11.5–14.5)
PERFORMED ON: NORMAL
PLATELET # BLD: 190 K/UL (ref 130–400)
PMV BLD AUTO: 12.1 FL (ref 9.4–12.3)
POTASSIUM SERPL-SCNC: 4.2 MMOL/L (ref 3.5–5)
PROTHROMBIN TIME: 21.9 SEC (ref 12–14.6)
RBC # BLD: 2.58 M/UL (ref 4.2–5.4)
SODIUM BLD-SCNC: 139 MMOL/L (ref 136–145)
TOTAL PROTEIN: 5.6 G/DL (ref 6.6–8.7)
WBC # BLD: 8.7 K/UL (ref 4.8–10.8)

## 2018-03-30 PROCEDURE — 97116 GAIT TRAINING THERAPY: CPT

## 2018-03-30 PROCEDURE — 6370000000 HC RX 637 (ALT 250 FOR IP): Performed by: PHYSICIAN ASSISTANT

## 2018-03-30 PROCEDURE — 36415 COLL VENOUS BLD VENIPUNCTURE: CPT

## 2018-03-30 PROCEDURE — 2580000003 HC RX 258: Performed by: INTERNAL MEDICINE

## 2018-03-30 PROCEDURE — 6370000000 HC RX 637 (ALT 250 FOR IP): Performed by: CLINICAL NURSE SPECIALIST

## 2018-03-30 PROCEDURE — 6370000000 HC RX 637 (ALT 250 FOR IP): Performed by: ORTHOPAEDIC SURGERY

## 2018-03-30 PROCEDURE — 85027 COMPLETE CBC AUTOMATED: CPT

## 2018-03-30 PROCEDURE — 80053 COMPREHEN METABOLIC PANEL: CPT

## 2018-03-30 PROCEDURE — 82948 REAGENT STRIP/BLOOD GLUCOSE: CPT

## 2018-03-30 PROCEDURE — 85610 PROTHROMBIN TIME: CPT

## 2018-03-30 RX ORDER — ONDANSETRON 4 MG/1
4 TABLET, FILM COATED ORAL EVERY 8 HOURS PRN
Qty: 30 TABLET | Refills: 0 | Status: SHIPPED | OUTPATIENT
Start: 2018-03-30 | End: 2018-04-25 | Stop reason: CLARIF

## 2018-03-30 RX ORDER — HYDROMORPHONE HYDROCHLORIDE 2 MG/1
TABLET ORAL
Qty: 90 TABLET | Refills: 0 | Status: SHIPPED | OUTPATIENT
Start: 2018-03-30 | End: 2018-04-30

## 2018-03-30 RX ADMIN — Medication 150 MG: at 09:46

## 2018-03-30 RX ADMIN — POTASSIUM CHLORIDE 10 MEQ: 10 TABLET, EXTENDED RELEASE ORAL at 09:46

## 2018-03-30 RX ADMIN — ERGOCALCIFEROL 50000 UNITS: 1.25 CAPSULE ORAL at 09:46

## 2018-03-30 RX ADMIN — SUCRALFATE 1 G: 1 TABLET ORAL at 00:31

## 2018-03-30 RX ADMIN — ESCITALOPRAM OXALATE 10 MG: 10 TABLET ORAL at 09:46

## 2018-03-30 RX ADMIN — ACETAMINOPHEN 1000 MG: 500 TABLET ORAL at 09:46

## 2018-03-30 RX ADMIN — PANTOPRAZOLE SODIUM 40 MG: 40 TABLET, DELAYED RELEASE ORAL at 09:46

## 2018-03-30 RX ADMIN — ROPINIROLE HYDROCHLORIDE 1 MG: 1 TABLET, FILM COATED ORAL at 09:46

## 2018-03-30 RX ADMIN — MORPHINE SULFATE 30 MG: 15 TABLET, FILM COATED, EXTENDED RELEASE ORAL at 09:46

## 2018-03-30 RX ADMIN — FAMOTIDINE 20 MG: 20 TABLET, FILM COATED ORAL at 09:46

## 2018-03-30 RX ADMIN — SODIUM CHLORIDE: 9 INJECTION, SOLUTION INTRAVENOUS at 06:07

## 2018-03-30 RX ADMIN — SUCRALFATE 1 G: 1 TABLET ORAL at 06:07

## 2018-03-30 RX ADMIN — ACETAMINOPHEN 1000 MG: 500 TABLET ORAL at 00:30

## 2018-03-30 RX ADMIN — DOCUSATE SODIUM 100 MG: 100 CAPSULE, LIQUID FILLED ORAL at 09:50

## 2018-03-30 RX ADMIN — LEVOTHYROXINE SODIUM 100 MCG: 100 TABLET ORAL at 06:07

## 2018-03-30 ASSESSMENT — PAIN SCALES - GENERAL
PAINLEVEL_OUTOF10: 1
PAINLEVEL_OUTOF10: 5

## 2018-03-30 ASSESSMENT — PAIN DESCRIPTION - LOCATION: LOCATION: KNEE

## 2018-03-30 ASSESSMENT — PAIN DESCRIPTION - PAIN TYPE: TYPE: SURGICAL PAIN

## 2018-03-30 ASSESSMENT — PAIN DESCRIPTION - ORIENTATION: ORIENTATION: RIGHT

## 2018-04-02 ENCOUNTER — TELEPHONE (OUTPATIENT)
Dept: INTERNAL MEDICINE | Age: 68
End: 2018-04-02

## 2018-04-02 ENCOUNTER — TELEPHONE (OUTPATIENT)
Dept: INTERNAL MEDICINE CLINIC | Age: 68
End: 2018-04-02

## 2018-04-03 ENCOUNTER — ANTI-COAG VISIT (OUTPATIENT)
Dept: INTERNAL MEDICINE | Age: 68
End: 2018-04-03

## 2018-04-03 LAB — INR BLD: 1.2

## 2018-04-11 ENCOUNTER — TELEPHONE (OUTPATIENT)
Dept: INTERNAL MEDICINE | Age: 68
End: 2018-04-11

## 2018-04-11 ENCOUNTER — ANTI-COAG VISIT (OUTPATIENT)
Dept: INTERNAL MEDICINE | Age: 68
End: 2018-04-11
Payer: MEDICARE

## 2018-04-11 DIAGNOSIS — H35.029: ICD-10-CM

## 2018-04-11 LAB — INR BLD: 1.5

## 2018-04-11 PROCEDURE — 85610 PROTHROMBIN TIME: CPT | Performed by: INTERNAL MEDICINE

## 2018-04-11 RX ORDER — WARFARIN SODIUM 7.5 MG/1
7.5 TABLET ORAL DAILY
Qty: 30 TABLET | Refills: 3 | Status: SHIPPED | OUTPATIENT
Start: 2018-04-11 | End: 2018-04-25 | Stop reason: CLARIF

## 2018-04-17 ENCOUNTER — TELEPHONE (OUTPATIENT)
Dept: INTERNAL MEDICINE | Age: 68
End: 2018-04-17

## 2018-04-17 ENCOUNTER — ANTI-COAG VISIT (OUTPATIENT)
Dept: INTERNAL MEDICINE | Age: 68
End: 2018-04-17
Payer: MEDICARE

## 2018-04-17 DIAGNOSIS — D68.59 HYPERCOAGULABLE STATE (HCC): ICD-10-CM

## 2018-04-17 LAB — INR BLD: 1.3

## 2018-04-17 PROCEDURE — 93793 ANTICOAG MGMT PT WARFARIN: CPT | Performed by: INTERNAL MEDICINE

## 2018-04-17 RX ORDER — POTASSIUM CHLORIDE 750 MG/1
10 TABLET, EXTENDED RELEASE ORAL DAILY
Qty: 30 TABLET | Refills: 3 | Status: SHIPPED | OUTPATIENT
Start: 2018-04-17 | End: 2018-08-07 | Stop reason: SDUPTHER

## 2018-04-17 NOTE — PROGRESS NOTES
[facility]  OUTPATIENT PHYSICAL THERAPY  DISCHARGE SUMMARY    Date: 2018  Patient Name: Arley Alexandre        MRN: [id number]    ACCOUNT #: [de-identified]  : 1950  (75 y.o.)  Gender: female   Referring Practitioner: TWAN Goins  Diagnosis: Acute bilateral low back pain with bilateral sciatics (M54.42)  Referral Date : 18  Treatment Diagnosis: Low back pain    Total # of Visits Approved: 12  Total # of Visits to Date: 7    Subjective  Subjective: Patient reports her surgeon told her to go easy on her knee to decrease inflammation before her surgery on Monday and to not perform the exercises that aggravate it. Additional Pertinent Hx: Ms. Gerda Moore presents to therapy with complaint of low back pain that began after a fall onto her back. Additional pertinent medical history includes cardiac pacemaker, dizziness, lupus, osteoarthritis, osteoporosis, DM2. Objective  Treatments received include: Therapeutic exercise, cryotherapy  See objective/subjective data in goals    Assessment  Assessment: Mrs. Gerda Moore has made modest progress since initiation of therapy with low back pain and ability to perform functional mobility. Patient continues to have significant knee pain which is likely contributing to back use and pain. Patient has made progress toward majority of goals and will continue to do so with further therapy. Plan     Patient is discharged to home with HEP.       Goals  Short term goals  Time Frame for Short term goals: 3-4 weeks  Short term goal 1: Patient will be independent with HEP - met (3/23 Patient reports performing exercises at home at least once a day)  Short term goal 2: Patient will decrease TTP L lower lumbar region to demonstrate improved muscular biomechanics surrounding lumbar spine - progress (3/23 Patient still with TTP L lumbar however less than after first injured)  Short term goal 3: Patient will increase lumbar flexion ROM to 0-60 to demonstrate improved functional mobility as needed for ADLS - progress    Long term goals  Time Frame for Long term goals : 4-6 weeks  Long term goal 1: Patient will improve score on Oswestry to 20% impairment or less to demonstrate improved quality of life - not met (3/23 31% impairment today, same as on evaluation)  Long term goal 2: Patient will increase strength in bilateral LEs to 4+/5 throughout as needed for transfers and ambulation - progress (3/23 4/5 throughout today)  Long term goal 3: Patient will decrease average pain rating to 2/10 or better to demonstrate improved quality of life - not met (3/23 7/10 average pain)    PT G-Codes  Functional Assessment Tool Used: Oswestry Disability Questionnaire  Score: 31% impairment  Functional Limitation: Mobility: Walking and moving around  Mobility: Walking and Moving Around Current Status (): At least 20 percent but less than 40 percent impaired, limited or restricted  Mobility: Walking and Moving Around Goal Status (): At least 20 percent but less than 40 percent impaired, limited or restricted  Mobility: Walking and Moving Around Discharge Status ():  At least 20 percent but less than 40 percent impaired, limited or restricted    Electronically signed by Lindsey Melchor PT on 4/17/2018 at 2:00 PM

## 2018-04-24 ENCOUNTER — ANTI-COAG VISIT (OUTPATIENT)
Dept: INTERNAL MEDICINE | Age: 68
End: 2018-04-24
Payer: MEDICARE

## 2018-04-24 ENCOUNTER — TELEPHONE (OUTPATIENT)
Dept: INTERNAL MEDICINE | Age: 68
End: 2018-04-24

## 2018-04-24 DIAGNOSIS — H35.029: ICD-10-CM

## 2018-04-24 LAB — INR BLD: 1.7

## 2018-04-24 PROCEDURE — 93793 ANTICOAG MGMT PT WARFARIN: CPT | Performed by: INTERNAL MEDICINE

## 2018-04-25 ENCOUNTER — OFFICE VISIT (OUTPATIENT)
Dept: INTERNAL MEDICINE | Age: 68
End: 2018-04-25
Payer: MEDICARE

## 2018-04-25 VITALS
DIASTOLIC BLOOD PRESSURE: 88 MMHG | OXYGEN SATURATION: 99 % | BODY MASS INDEX: 34.53 KG/M2 | WEIGHT: 220 LBS | HEIGHT: 67 IN | RESPIRATION RATE: 16 BRPM | SYSTOLIC BLOOD PRESSURE: 132 MMHG | HEART RATE: 70 BPM

## 2018-04-25 DIAGNOSIS — L30.9 DERMATITIS: ICD-10-CM

## 2018-04-25 DIAGNOSIS — D64.9 ANEMIA, UNSPECIFIED TYPE: ICD-10-CM

## 2018-04-25 DIAGNOSIS — I10 ESSENTIAL HYPERTENSION: ICD-10-CM

## 2018-04-25 DIAGNOSIS — I10 ESSENTIAL HYPERTENSION: Primary | ICD-10-CM

## 2018-04-25 DIAGNOSIS — M17.11 PRIMARY OSTEOARTHRITIS OF RIGHT KNEE: ICD-10-CM

## 2018-04-25 DIAGNOSIS — E11.21 TYPE II DIABETES MELLITUS WITH NEPHROPATHY (HCC): ICD-10-CM

## 2018-04-25 LAB
ALBUMIN SERPL-MCNC: 4.1 G/DL (ref 3.5–5.2)
ALP BLD-CCNC: 79 U/L (ref 35–104)
ALT SERPL-CCNC: 7 U/L (ref 5–33)
ANION GAP SERPL CALCULATED.3IONS-SCNC: 17 MMOL/L (ref 7–19)
AST SERPL-CCNC: 19 U/L (ref 5–32)
BASOPHILS ABSOLUTE: 0 K/UL (ref 0–0.2)
BASOPHILS RELATIVE PERCENT: 0.8 % (ref 0–1)
BILIRUB SERPL-MCNC: 0.6 MG/DL (ref 0.2–1.2)
BUN BLDV-MCNC: 18 MG/DL (ref 8–23)
CALCIUM SERPL-MCNC: 9.6 MG/DL (ref 8.8–10.2)
CHLORIDE BLD-SCNC: 104 MMOL/L (ref 98–111)
CHOLESTEROL, TOTAL: 205 MG/DL (ref 160–199)
CO2: 23 MMOL/L (ref 22–29)
CREAT SERPL-MCNC: 1 MG/DL (ref 0.5–0.9)
EOSINOPHILS ABSOLUTE: 0.2 K/UL (ref 0–0.6)
EOSINOPHILS RELATIVE PERCENT: 3.1 % (ref 0–5)
GFR NON-AFRICAN AMERICAN: 55
GLUCOSE BLD-MCNC: 92 MG/DL (ref 74–109)
HBA1C MFR BLD: 5.1 %
HCT VFR BLD CALC: 33.3 % (ref 37–47)
HDLC SERPL-MCNC: 64 MG/DL (ref 65–121)
HEMOGLOBIN: 10.1 G/DL (ref 12–16)
IRON: 54 UG/DL (ref 37–145)
LDL CHOLESTEROL CALCULATED: 118 MG/DL
LYMPHOCYTES ABSOLUTE: 1.4 K/UL (ref 1.1–4.5)
LYMPHOCYTES RELATIVE PERCENT: 29.1 % (ref 20–40)
MCH RBC QN AUTO: 28.5 PG (ref 27–31)
MCHC RBC AUTO-ENTMCNC: 30.3 G/DL (ref 33–37)
MCV RBC AUTO: 93.8 FL (ref 81–99)
MONOCYTES ABSOLUTE: 0.5 K/UL (ref 0–0.9)
MONOCYTES RELATIVE PERCENT: 11.2 % (ref 0–10)
NEUTROPHILS ABSOLUTE: 2.7 K/UL (ref 1.5–7.5)
NEUTROPHILS RELATIVE PERCENT: 55.6 % (ref 50–65)
PDW BLD-RTO: 16.4 % (ref 11.5–14.5)
PLATELET # BLD: 234 K/UL (ref 130–400)
PMV BLD AUTO: 11.7 FL (ref 9.4–12.3)
POTASSIUM SERPL-SCNC: 4 MMOL/L (ref 3.5–5)
RBC # BLD: 3.55 M/UL (ref 4.2–5.4)
SODIUM BLD-SCNC: 144 MMOL/L (ref 136–145)
TOTAL PROTEIN: 8.1 G/DL (ref 6.6–8.7)
TRIGL SERPL-MCNC: 115 MG/DL (ref 0–149)
TSH SERPL DL<=0.05 MIU/L-ACNC: 2.83 UIU/ML (ref 0.27–4.2)
VITAMIN B-12: 698 PG/ML (ref 211–946)
WBC # BLD: 4.8 K/UL (ref 4.8–10.8)

## 2018-04-25 PROCEDURE — G8417 CALC BMI ABV UP PARAM F/U: HCPCS | Performed by: NURSE PRACTITIONER

## 2018-04-25 PROCEDURE — 1090F PRES/ABSN URINE INCON ASSESS: CPT | Performed by: NURSE PRACTITIONER

## 2018-04-25 PROCEDURE — G8598 ASA/ANTIPLAT THER USED: HCPCS | Performed by: NURSE PRACTITIONER

## 2018-04-25 PROCEDURE — 3017F COLORECTAL CA SCREEN DOC REV: CPT | Performed by: NURSE PRACTITIONER

## 2018-04-25 PROCEDURE — G8400 PT W/DXA NO RESULTS DOC: HCPCS | Performed by: NURSE PRACTITIONER

## 2018-04-25 PROCEDURE — 99214 OFFICE O/P EST MOD 30 MIN: CPT | Performed by: NURSE PRACTITIONER

## 2018-04-25 PROCEDURE — G8427 DOCREV CUR MEDS BY ELIG CLIN: HCPCS | Performed by: NURSE PRACTITIONER

## 2018-04-25 PROCEDURE — 4040F PNEUMOC VAC/ADMIN/RCVD: CPT | Performed by: NURSE PRACTITIONER

## 2018-04-25 PROCEDURE — 1036F TOBACCO NON-USER: CPT | Performed by: NURSE PRACTITIONER

## 2018-04-25 PROCEDURE — 1123F ACP DISCUSS/DSCN MKR DOCD: CPT | Performed by: NURSE PRACTITIONER

## 2018-04-25 PROCEDURE — 1111F DSCHRG MED/CURRENT MED MERGE: CPT | Performed by: NURSE PRACTITIONER

## 2018-04-25 RX ORDER — WARFARIN SODIUM 7.5 MG/1
7.5 TABLET ORAL DAILY
COMMUNITY
End: 2018-08-07 | Stop reason: SDUPTHER

## 2018-04-25 RX ORDER — LISINOPRIL 20 MG/1
20 TABLET ORAL DAILY
Qty: 30 TABLET | Refills: 3
Start: 2018-04-25 | End: 2018-08-07 | Stop reason: SDUPTHER

## 2018-04-25 RX ORDER — PANTOPRAZOLE SODIUM 40 MG/1
40 TABLET, DELAYED RELEASE ORAL 2 TIMES DAILY
Qty: 60 TABLET | Refills: 2
Start: 2018-04-25 | End: 2018-08-07 | Stop reason: SDUPTHER

## 2018-04-26 ENCOUNTER — TELEPHONE (OUTPATIENT)
Dept: INTERNAL MEDICINE | Age: 68
End: 2018-04-26

## 2018-04-27 LAB — VITAMIN D 1,25-DIHYDROXY: 88.1 PG/ML (ref 19.9–79.3)

## 2018-04-30 ENCOUNTER — TELEPHONE (OUTPATIENT)
Dept: INPATIENT UNIT | Age: 68
End: 2018-04-30

## 2018-05-03 ENCOUNTER — OFFICE VISIT (OUTPATIENT)
Dept: BARIATRICS/WEIGHT MGMT | Facility: CLINIC | Age: 68
End: 2018-05-03

## 2018-05-03 VITALS
OXYGEN SATURATION: 100 % | DIASTOLIC BLOOD PRESSURE: 89 MMHG | BODY MASS INDEX: 35 KG/M2 | HEIGHT: 67 IN | SYSTOLIC BLOOD PRESSURE: 160 MMHG | TEMPERATURE: 97.8 F | WEIGHT: 223 LBS | HEART RATE: 65 BPM

## 2018-05-03 DIAGNOSIS — K21.9 CHRONIC GERD: ICD-10-CM

## 2018-05-03 DIAGNOSIS — Z98.84 HISTORY OF ROUX-EN-Y GASTRIC BYPASS: Primary | ICD-10-CM

## 2018-05-03 PROCEDURE — 99212 OFFICE O/P EST SF 10 MIN: CPT | Performed by: SURGERY

## 2018-05-03 RX ORDER — HYDROMORPHONE HYDROCHLORIDE 2 MG/1
2 TABLET ORAL EVERY 4 HOURS PRN
COMMUNITY
End: 2019-09-23

## 2018-05-03 RX ORDER — LISINOPRIL 20 MG/1
20 TABLET ORAL DAILY
Status: ON HOLD | COMMUNITY
End: 2020-02-26

## 2018-05-03 NOTE — PROGRESS NOTES
"Subjective   Veronica Stewart is a 67 y.o. female.     Veronica is post op several years ago from her gastric bypass performed in 2002.  She is currently on her regular diet.  She states she had recently had a total knee procedure.  She stated since her procedure if she has lost her appetite.  She denies nausea vomiting.  She is able to eat however does not \"want to\".    Review Of Systems:  General ROS: positive for  - night sweats  Psychological ROS: positive for - anxiety and depression  Respiratory ROS: no cough, shortness of breath, or wheezing  Cardiovascular ROS: no chest pain or dyspnea on exertion  positive for - edema  Gastrointestinal ROS: no abdominal pain, change in bowel habits, or black or bloody stools  Neurological ROS: no TIA or stroke symptoms    The following portions of the patient's history were reviewed and updated as appropriate: allergies, current medications, past medical history, past surgical history and problem list.    Objective   /89 (BP Location: Left arm, Patient Position: Sitting, Cuff Size: Adult)   Pulse 65   Temp 97.8 °F (36.6 °C)   Ht 170.2 cm (67\")   Wt 101 kg (223 lb)   SpO2 100%   BMI 34.93 kg/m²     General Appearance:  awake, alert, oriented, in no acute distress  Abdomen:  Soft, non-tender, normal bowel sounds; no bruits, organomegaly or masses.  Abnormal shape: obese  Wounds: clean, dry, intact    Assessment/Plan     Encounter Diagnoses   Name Primary?   • History of Yamilex-en-Y gastric bypass Yes   • Chronic GERD        Veronica Stewart and I discussed the importance of changing behavior for consistent and successful weight loss.  We first reviewed again the definition of a meal which is defined as portion sizes less than a half a cup and those portions incorporating a protein.  Specifically the protein should fill half of that volume.  I also explained that she should attempt to consume 4 meals as defined above daily and to avoid snacking or grazing.  She should also be " mindful of adequate hydration by consuming at least 64 oz of water daily and incorporation of a regular exercise regimen.   I discussed the importance of taking her multivitamins as directed.  She is to return to our office 1 or as needed.      05/03/18  11:05 AM  Patient Care Team:  Zora Jackson MD as PCP - General  Zora Jackson MD as PCP - Family Medicine  Ronny Lowe MD as Cardiologist (Cardiology)  Shaji Nugent MD as Consulting Physician (Orthopedic Surgery)  Tohmas Yates MD FACS

## 2018-05-04 ENCOUNTER — OFFICE VISIT (OUTPATIENT)
Dept: CARDIOLOGY | Facility: CLINIC | Age: 68
End: 2018-05-04

## 2018-05-04 ENCOUNTER — CLINICAL SUPPORT NO REQUIREMENTS (OUTPATIENT)
Dept: CARDIOLOGY | Facility: CLINIC | Age: 68
End: 2018-05-04

## 2018-05-04 VITALS
HEART RATE: 99 BPM | WEIGHT: 224 LBS | BODY MASS INDEX: 35.16 KG/M2 | SYSTOLIC BLOOD PRESSURE: 168 MMHG | HEIGHT: 67 IN | OXYGEN SATURATION: 99 % | DIASTOLIC BLOOD PRESSURE: 80 MMHG

## 2018-05-04 DIAGNOSIS — E66.01 MORBID OBESITY DUE TO EXCESS CALORIES (HCC): ICD-10-CM

## 2018-05-04 DIAGNOSIS — Z98.84 HISTORY OF ROUX-EN-Y GASTRIC BYPASS: ICD-10-CM

## 2018-05-04 DIAGNOSIS — E66.9 OBESITY, CLASS II, BMI 35-39.9: ICD-10-CM

## 2018-05-04 DIAGNOSIS — Z95.0 PACEMAKER: Primary | ICD-10-CM

## 2018-05-04 DIAGNOSIS — I10 HYPERTENSION, UNCONTROLLED: ICD-10-CM

## 2018-05-04 DIAGNOSIS — D68.62 LUPUS ANTICOAGULANT DISORDER (HCC): ICD-10-CM

## 2018-05-04 DIAGNOSIS — I10 HYPERTENSION, UNCONTROLLED: Primary | ICD-10-CM

## 2018-05-04 PROCEDURE — 93288 INTERROG EVL PM/LDLS PM IP: CPT | Performed by: INTERNAL MEDICINE

## 2018-05-04 PROCEDURE — 99213 OFFICE O/P EST LOW 20 MIN: CPT | Performed by: INTERNAL MEDICINE

## 2018-05-04 PROCEDURE — 93000 ELECTROCARDIOGRAM COMPLETE: CPT | Performed by: INTERNAL MEDICINE

## 2018-05-04 NOTE — PROGRESS NOTES
Referring Provider: Zora Jackson MD    Reason for Follow-up Visit: HTN    Subjective .   Chief Complaint: No chief complaint on file.      History of present illness:  Veronica Stewart is a 67 y.o. yo female with history of sss, s/p pacemaker in the past. In for routine follow up.        History  Past Medical History:   Diagnosis Date   • Anxiety    • Arben-tachy syndrome    • Bradycardia    • Breath shortness    • Cardiac pacemaker     11/09 [facility]   • Chest pain    • Chronic fatigue    • Depression    • Diabetes mellitus    • Dizziness    • Fatigue    • Follow-up exam    • Heart burn    • Hypercoagulable state, primary     LUPUS ANTI-COAG   • Hyperlipidemia    • Hypertension    • Liver disease    • Shortness of breath    • Sleep apnea    • Stroke    • Syncope    • Tachy-arben syndrome    ,   Past Surgical History:   Procedure Laterality Date   • APPENDECTOMY     • CATARACT EXTRACTION     • CHOLECYSTECTOMY     • HERNIA REPAIR     • HYSTERECTOMY     • OOPHORECTOMY     • PACEMAKER IMPLANTATION     • REPLACEMENT TOTAL KNEE Right 03/26/2018   • TRAM-EN-Y PROCEDURE  2002    Dr Dahiana [facility]   • SPLENECTOMY     ,   Family History   Problem Relation Age of Onset   • Coronary artery disease Mother    • Hyperlipidemia Mother    • Anemia Mother    • Cervical cancer Mother    • Kidney failure Father    • Heart failure Father    • Fibromyalgia Sister    • Anemia Sister    • Clotting disorder Sister    • Alcohol abuse Brother    • Cancer Maternal Grandmother    • Diabetes Maternal Grandmother    • Heart failure Maternal Grandfather    • No Known Problems Paternal Grandmother    • No Known Problems Paternal Grandfather    • Colon cancer Brother    ,   Social History   Substance Use Topics   • Smoking status: Never Smoker   • Smokeless tobacco: Never Used   • Alcohol use No   ,     Medications  Current Outpatient Prescriptions   Medication Sig Dispense Refill   • ADVAIR DISKUS 250-50 MCG/DOSE DISKUS 2 (two)  times a day.     • amLODIPine (NORVASC) 5 MG tablet Take 5 mg by mouth Daily.     • aspirin 81 MG EC tablet daily.     • bumetanide (BUMEX) 0.5 MG tablet Take 1 mg by mouth Daily.     • CARAFATE 1 GM/10ML suspension Take 10 mL by mouth 4 (Four) Times a Day. 420 mL 0   • clobetasol (TEMOVATE) 0.05 % cream Apply  topically 2 (Two) Times a Day.     • CloNIDine (CATAPRES) 0.1 MG tablet Take 0.1 mg by mouth 2 (Two) Times a Day As Needed.     • cyanocobalamin 1000 MCG/ML injection Inject 1,000 mcg into the shoulder, thigh, or buttocks Every 28 (Twenty-Eight) Days.     • ergocalciferol (ERGOCALCIFEROL) 74533 UNITS capsule Take 1 capsule by mouth 1 (One) Time Per Week. 4 capsule 3   • escitalopram (LEXAPRO) 10 MG tablet Take 10 mg by mouth Daily.     • gabapentin (NEURONTIN) 300 MG capsule 2 (Two) Times a Day.     • HYDROmorphone (DILAUDID) 2 MG tablet Take 2 mg by mouth Every 4 (Four) Hours As Needed for Moderate Pain .     • levothyroxine (SYNTHROID, LEVOTHROID) 100 MCG tablet Take  by mouth.     • lisinopril (PRINIVIL,ZESTRIL) 20 MG tablet Take 20 mg by mouth Daily.     • losartan-hydrochlorothiazide (HYZAAR) 100-25 MG per tablet Take 1 tablet by mouth Daily.     • Multiple Vitamins-Minerals (CENTRUM MULTIGUMMIES) chewable tablet Chew Daily.     • ondansetron (ZOFRAN) 4 MG tablet Take 4 mg by mouth Every 8 (Eight) Hours As Needed for Nausea or Vomiting.     • pantoprazole (PROTONIX) 40 MG EC tablet 2 (two) times a day.     • potassium chloride (K-DUR,KLOR-CON) 10 MEQ CR tablet Take  by mouth.     • raNITIdine (ZANTAC) 150 MG tablet Take 150 mg by mouth Every 12 (Twelve) Hours As Needed for Heartburn.     • rOPINIRole (REQUIP) 0.5 MG tablet Take 0.5 mg by mouth At Night As Needed. Take 1 hour before bedtime.     • warfarin (COUMADIN) 5 MG tablet 7.5 mg Daily. Take 2 tabs daily  Managed by Dr. Jackson       No current facility-administered medications for this visit.        Allergies:  Insect extract allergy skin test; Bee  venom; Other; Percocet [oxycodone-acetaminophen]; Ultram [tramadol hcl]; and Hydrocodone-acetaminophen    Review of Systems  Review of Systems   HENT: Negative for nosebleeds.    Cardiovascular: Negative for chest pain, claudication, dyspnea on exertion, irregular heartbeat, leg swelling, near-syncope, orthopnea, palpitations, paroxysmal nocturnal dyspnea and syncope.   Respiratory: Negative for cough, hemoptysis and shortness of breath.    Gastrointestinal: Negative for dysphagia, hematemesis and melena.   Genitourinary: Negative for hematuria.   All other systems reviewed and are negative.      Objective     Physical Exam:  There were no vitals taken for this visit.  Physical Exam   Constitutional: She is oriented to person, place, and time. She appears well-developed and well-nourished. No distress.   HENT:   Head: Normocephalic and atraumatic.   Eyes: No scleral icterus.   Neck: Normal range of motion.   Cardiovascular: Normal rate, regular rhythm and normal heart sounds.  Exam reveals no gallop and no friction rub.    No murmur heard.  Pulmonary/Chest: Effort normal and breath sounds normal. No respiratory distress. She has no wheezes. She has no rales.   Abdominal: Soft. Bowel sounds are normal. She exhibits no distension. There is no tenderness.   Musculoskeletal: She exhibits no edema.   Neurological: She is alert and oriented to person, place, and time. No cranial nerve deficit.   Skin: Skin is warm and dry. She is not diaphoretic. No erythema.   Psychiatric: She has a normal mood and affect. Her behavior is normal.       Results Review:    ECG 12 Lead  Date/Time: 5/4/2018 9:32 AM  Performed by: JARED BURGESS  Authorized by: JARED BURGESS   Comparison: compared with previous ECG   Similar to previous ECG  Rhythm: sinus rhythm and paced  Ectopy: atrial premature contractions  Rate: normal  Conduction: conduction normal  ST Segments: ST segments normal  QRS axis: normal  Clinical impression: non-specific  ECG            Office Visit on 06/15/2017   Component Date Value Ref Range Status   • Vitamin B-12 06/15/2017 306  239 - 931 pg/mL Final   • Iron 06/15/2017 68  42 - 180 mcg/dL Final   • Folate 06/15/2017 9.85  >2.75 ng/mL Final   • Calcium 06/15/2017 9.7  8.4 - 10.4 mg/dL Final       Assessment/Plan   Diagnoses and all orders for this visit:    Morbid obesity due to excess calories, Patient's There is no height or weight on file to calculate BMI. BMI is above normal parameters. Follow-up plan includes:  no follow-up required. Followed by Dr. Yates    History of Yamilex-en-Y gastric bypass, followed by Dr. Yates    Lupus anticoagulant disorder, followed by Dr. Jackson    Hypertension, still a little high. Currently managed by Dr. Oropeza    Pacemaker, battery still adequate    Other orders  -     ECG 12 Lead

## 2018-05-04 NOTE — PROGRESS NOTES
Referring Provider: Zora Jackson MD     Reason for Follow-up Visit: HTN        Subjective    .   Chief Complaint: No chief complaint on file.        History of present illness:  Veronica Stewart is a 67 y.o. yo female with history of sss, s/p pacemaker in the past. In for routine follow up.         History  Medical History        Past Medical History:   Diagnosis Date   • Anxiety     • Arben-tachy syndrome     • Bradycardia     • Breath shortness     • Cardiac pacemaker       11/09 MED [facility]   • Chest pain     • Chronic fatigue     • Depression     • Diabetes mellitus     • Dizziness     • Fatigue     • Follow-up exam     • Heart burn     • Hypercoagulable state, primary       LUPUS ANTI-COAG   • Hyperlipidemia     • Hypertension     • Liver disease     • Shortness of breath     • Sleep apnea     • Stroke     • Syncope     • Tachy-arben syndrome        ,   Surgical History         Past Surgical History:   Procedure Laterality Date   • APPENDECTOMY       • CATARACT EXTRACTION       • CHOLECYSTECTOMY       • HERNIA REPAIR       • HYSTERECTOMY       • OOPHORECTOMY       • PACEMAKER IMPLANTATION       • REPLACEMENT TOTAL KNEE Right 03/26/2018   • TRAM-EN-Y PROCEDURE   2002     Dr Dahiana Colon [facility]   • SPLENECTOMY          ,         Family History   Problem Relation Age of Onset   • Coronary artery disease Mother     • Hyperlipidemia Mother     • Anemia Mother     • Cervical cancer Mother     • Kidney failure Father     • Heart failure Father     • Fibromyalgia Sister     • Anemia Sister     • Clotting disorder Sister     • Alcohol abuse Brother     • Cancer Maternal Grandmother     • Diabetes Maternal Grandmother     • Heart failure Maternal Grandfather     • No Known Problems Paternal Grandmother     • No Known Problems Paternal Grandfather     • Colon cancer Brother     ,        Social History   Substance Use Topics   • Smoking status: Never Smoker   • Smokeless tobacco: Never Used   • Alcohol use No   ,       Medications  Current Medications          Current Outpatient Prescriptions   Medication Sig Dispense Refill   • ADVAIR DISKUS 250-50 MCG/DOSE DISKUS 2 (two) times a day.       • amLODIPine (NORVASC) 5 MG tablet Take 5 mg by mouth Daily.       • aspirin 81 MG EC tablet daily.       • bumetanide (BUMEX) 0.5 MG tablet Take 1 mg by mouth Daily.       • CARAFATE 1 GM/10ML suspension Take 10 mL by mouth 4 (Four) Times a Day. 420 mL 0   • clobetasol (TEMOVATE) 0.05 % cream Apply  topically 2 (Two) Times a Day.       • CloNIDine (CATAPRES) 0.1 MG tablet Take 0.1 mg by mouth 2 (Two) Times a Day As Needed.       • cyanocobalamin 1000 MCG/ML injection Inject 1,000 mcg into the shoulder, thigh, or buttocks Every 28 (Twenty-Eight) Days.       • ergocalciferol (ERGOCALCIFEROL) 44529 UNITS capsule Take 1 capsule by mouth 1 (One) Time Per Week. 4 capsule 3   • escitalopram (LEXAPRO) 10 MG tablet Take 10 mg by mouth Daily.       • gabapentin (NEURONTIN) 300 MG capsule 2 (Two) Times a Day.       • HYDROmorphone (DILAUDID) 2 MG tablet Take 2 mg by mouth Every 4 (Four) Hours As Needed for Moderate Pain .       • levothyroxine (SYNTHROID, LEVOTHROID) 100 MCG tablet Take  by mouth.       • lisinopril (PRINIVIL,ZESTRIL) 20 MG tablet Take 20 mg by mouth Daily.       • losartan-hydrochlorothiazide (HYZAAR) 100-25 MG per tablet Take 1 tablet by mouth Daily.       • Multiple Vitamins-Minerals (CENTRUM MULTIGUMMIES) chewable tablet Chew Daily.       • ondansetron (ZOFRAN) 4 MG tablet Take 4 mg by mouth Every 8 (Eight) Hours As Needed for Nausea or Vomiting.       • pantoprazole (PROTONIX) 40 MG EC tablet 2 (two) times a day.       • potassium chloride (K-DUR,KLOR-CON) 10 MEQ CR tablet Take  by mouth.       • raNITIdine (ZANTAC) 150 MG tablet Take 150 mg by mouth Every 12 (Twelve) Hours As Needed for Heartburn.       • rOPINIRole (REQUIP) 0.5 MG tablet Take 0.5 mg by mouth At Night As Needed. Take 1 hour before bedtime.       • warfarin  (COUMADIN) 5 MG tablet 7.5 mg Daily. Take 2 tabs daily  Managed by Dr. Jackson          No current facility-administered medications for this visit.             Allergies:  Insect extract allergy skin test; Bee venom; Other; Percocet [oxycodone-acetaminophen]; Ultram [tramadol hcl]; and Hydrocodone-acetaminophen     Review of Systems  Review of Systems   HENT: Negative for nosebleeds.    Cardiovascular: Negative for chest pain, claudication, dyspnea on exertion, irregular heartbeat, leg swelling, near-syncope, orthopnea, palpitations, paroxysmal nocturnal dyspnea and syncope.   Respiratory: Negative for cough, hemoptysis and shortness of breath.    Gastrointestinal: Negative for dysphagia, hematemesis and melena.   Genitourinary: Negative for hematuria.   All other systems reviewed and are negative.           Objective         Physical Exam:  There were no vitals taken for this visit.  Physical Exam   Constitutional: She is oriented to person, place, and time. She appears well-developed and well-nourished. No distress.   HENT:   Head: Normocephalic and atraumatic.   Eyes: No scleral icterus.   Neck: Normal range of motion.   Cardiovascular: Normal rate, regular rhythm and normal heart sounds.  Exam reveals no gallop and no friction rub.    No murmur heard.  Pulmonary/Chest: Effort normal and breath sounds normal. No respiratory distress. She has no wheezes. She has no rales.   Abdominal: Soft. Bowel sounds are normal. She exhibits no distension. There is no tenderness.   Musculoskeletal: She exhibits no edema.   Neurological: She is alert and oriented to person, place, and time. No cranial nerve deficit.   Skin: Skin is warm and dry. She is not diaphoretic. No erythema.   Psychiatric: She has a normal mood and affect. Her behavior is normal.         Results Review:    ECG 12 Lead  Date/Time: 5/4/2018 9:32 AM  Performed by: JARED BURGESS  Authorized by: JARED BURGESS   Comparison: compared with previous ECG   Similar  to previous ECG  Rhythm: sinus rhythm and paced  Ectopy: atrial premature contractions  Rate: normal  Conduction: conduction normal  ST Segments: ST segments normal  QRS axis: normal  Clinical impression: non-specific ECG                    Office Visit on 06/15/2017   Component Date Value Ref Range Status   • Vitamin B-12 06/15/2017 306  239 - 931 pg/mL Final   • Iron 06/15/2017 68  42 - 180 mcg/dL Final   • Folate 06/15/2017 9.85  >2.75 ng/mL Final   • Calcium 06/15/2017 9.7  8.4 - 10.4 mg/dL Final            Assessment/Plan      Diagnoses and all orders for this visit:     Morbid obesity due to excess calories, Patient's There is no height or weight on file to calculate BMI. BMI is above normal parameters. Follow-up plan includes:  no follow-up required. Followed by Dr. Yates     History of Yamilex-en-Y gastric bypass, followed by Dr. Yates     Lupus anticoagulant disorder, followed by Dr. Jackson     Hypertension, still a little high. Currently managed by Dr. Oropeza     Pacemaker, battery still adequate     Other orders  -     ECG 12 Lead

## 2018-05-05 NOTE — PROGRESS NOTES
Dual Chamber Pacemaker Evaluation Report  Clinic Interrogation    May 5, 2018    Primary Cardiologist: Mynor  : Medtronic Model: EnRhythm  Implant date: 11/13/09    Reason for evaluation: routine  Indication for pacemaker: sick sinus syndrome    Measurements  Atrial sensing - P wave: 1.8 mV  Atrial threshold: n/r  Atrial lead impedance: 512 ohms  Ventricular sensing - R wave: 4.0 mV  Ventricular threshold: n/r  Ventricular lead impedance:   488 ohms     Diagnostic Data  Atrial paced: 87.6 %  Ventricular paced: 3.4 %  Other: One one-second run of NSVT  Battery status: satisfactory         Final Parameters  Mode:  AAIR+  Lower rate: 60 bpm   Upper rate: 130 bpm  AV Delay: paced- 180 ms  Sensed-150 ms  Atrial - Amplitude: 2.0 V   Pulse width: 0.4 ms   Sensitivity: 0.6 mV     Ventricular - Amplitude: 3.0 V  Pulse width: 0.4 ms  Sensitivity: 0.9 mV    Changes made: none  Conclusions: normal pacemaker function    Follow up: 3 months

## 2018-05-23 ENCOUNTER — ANTI-COAG VISIT (OUTPATIENT)
Dept: INTERNAL MEDICINE | Age: 68
End: 2018-05-23

## 2018-05-23 LAB — INR BLD: 1.2

## 2018-05-24 ENCOUNTER — TELEPHONE (OUTPATIENT)
Dept: INTERNAL MEDICINE | Age: 68
End: 2018-05-24

## 2018-05-25 ENCOUNTER — OFFICE VISIT (OUTPATIENT)
Dept: INTERNAL MEDICINE | Age: 68
End: 2018-05-25
Payer: MEDICARE

## 2018-05-25 VITALS
BODY MASS INDEX: 35.16 KG/M2 | HEART RATE: 63 BPM | DIASTOLIC BLOOD PRESSURE: 80 MMHG | HEIGHT: 67 IN | WEIGHT: 224 LBS | SYSTOLIC BLOOD PRESSURE: 120 MMHG | OXYGEN SATURATION: 99 %

## 2018-05-25 DIAGNOSIS — F32.A DEPRESSION, UNSPECIFIED DEPRESSION TYPE: ICD-10-CM

## 2018-05-25 DIAGNOSIS — E78.5 HYPERLIPIDEMIA, UNSPECIFIED HYPERLIPIDEMIA TYPE: ICD-10-CM

## 2018-05-25 DIAGNOSIS — D64.9 ANEMIA, UNSPECIFIED TYPE: ICD-10-CM

## 2018-05-25 DIAGNOSIS — K21.9 GASTROESOPHAGEAL REFLUX DISEASE WITHOUT ESOPHAGITIS: ICD-10-CM

## 2018-05-25 DIAGNOSIS — E03.9 HYPOTHYROIDISM, UNSPECIFIED TYPE: ICD-10-CM

## 2018-05-25 DIAGNOSIS — I10 ESSENTIAL HYPERTENSION: ICD-10-CM

## 2018-05-25 DIAGNOSIS — E11.9 TYPE 2 DIABETES MELLITUS WITHOUT COMPLICATION, UNSPECIFIED LONG TERM INSULIN USE STATUS: Primary | ICD-10-CM

## 2018-05-25 PROCEDURE — 3044F HG A1C LEVEL LT 7.0%: CPT | Performed by: INTERNAL MEDICINE

## 2018-05-25 PROCEDURE — 4040F PNEUMOC VAC/ADMIN/RCVD: CPT | Performed by: INTERNAL MEDICINE

## 2018-05-25 PROCEDURE — 3017F COLORECTAL CA SCREEN DOC REV: CPT | Performed by: INTERNAL MEDICINE

## 2018-05-25 PROCEDURE — 2022F DILAT RTA XM EVC RTNOPTHY: CPT | Performed by: INTERNAL MEDICINE

## 2018-05-25 PROCEDURE — 1123F ACP DISCUSS/DSCN MKR DOCD: CPT | Performed by: INTERNAL MEDICINE

## 2018-05-25 PROCEDURE — 1036F TOBACCO NON-USER: CPT | Performed by: INTERNAL MEDICINE

## 2018-05-25 PROCEDURE — G8417 CALC BMI ABV UP PARAM F/U: HCPCS | Performed by: INTERNAL MEDICINE

## 2018-05-25 PROCEDURE — G8427 DOCREV CUR MEDS BY ELIG CLIN: HCPCS | Performed by: INTERNAL MEDICINE

## 2018-05-25 PROCEDURE — G8400 PT W/DXA NO RESULTS DOC: HCPCS | Performed by: INTERNAL MEDICINE

## 2018-05-25 PROCEDURE — 99214 OFFICE O/P EST MOD 30 MIN: CPT | Performed by: INTERNAL MEDICINE

## 2018-05-25 PROCEDURE — G8598 ASA/ANTIPLAT THER USED: HCPCS | Performed by: INTERNAL MEDICINE

## 2018-05-25 PROCEDURE — 1090F PRES/ABSN URINE INCON ASSESS: CPT | Performed by: INTERNAL MEDICINE

## 2018-05-25 RX ORDER — HYDROMORPHONE HYDROCHLORIDE 2 MG/1
2 TABLET ORAL
COMMUNITY
End: 2019-01-24 | Stop reason: SDUPTHER

## 2018-05-27 ASSESSMENT — ENCOUNTER SYMPTOMS
COUGH: 0
SHORTNESS OF BREATH: 0
WHEEZING: 0
ABDOMINAL PAIN: 0
VOICE CHANGE: 0
VOMITING: 0
COLOR CHANGE: 0
TROUBLE SWALLOWING: 0
APNEA: 0
BACK PAIN: 0
SINUS PRESSURE: 0
CHEST TIGHTNESS: 0
EYE DISCHARGE: 0
SINUS PAIN: 0
ABDOMINAL DISTENTION: 0
RHINORRHEA: 0
SORE THROAT: 0
CONSTIPATION: 0
STRIDOR: 0
DIARRHEA: 0
EYE ITCHING: 0
BLOOD IN STOOL: 0

## 2018-05-31 ENCOUNTER — ANTI-COAG VISIT (OUTPATIENT)
Dept: FAMILY MEDICINE CLINIC | Age: 68
End: 2018-05-31
Payer: MEDICARE

## 2018-05-31 ENCOUNTER — TELEPHONE (OUTPATIENT)
Dept: INTERNAL MEDICINE | Age: 68
End: 2018-05-31

## 2018-05-31 DIAGNOSIS — D68.69 SECONDARY HYPERCOAGULABILITY DISORDER (HCC): ICD-10-CM

## 2018-05-31 LAB — INR BLD: 1.2

## 2018-05-31 PROCEDURE — 93793 ANTICOAG MGMT PT WARFARIN: CPT | Performed by: INTERNAL MEDICINE

## 2018-06-06 ENCOUNTER — ANTI-COAG VISIT (OUTPATIENT)
Dept: INTERNAL MEDICINE | Age: 68
End: 2018-06-06

## 2018-06-06 LAB — INR BLD: 4.2

## 2018-06-07 NOTE — PROGRESS NOTES
We just got the results yesterday. So have her hold tonight and tomorrow and recheck on Monday? Or...    Please advise

## 2018-07-10 ENCOUNTER — APPOINTMENT (OUTPATIENT)
Dept: GENERAL RADIOLOGY | Age: 68
End: 2018-07-10
Payer: MEDICARE

## 2018-07-10 ENCOUNTER — HOSPITAL ENCOUNTER (EMERGENCY)
Age: 68
Discharge: HOME | End: 2018-07-10
Payer: MEDICARE

## 2018-07-10 VITALS
RESPIRATION RATE: 17 BRPM | WEIGHT: 230 LBS | TEMPERATURE: 98.2 F | SYSTOLIC BLOOD PRESSURE: 154 MMHG | HEART RATE: 67 BPM | OXYGEN SATURATION: 96 % | BODY MASS INDEX: 36.1 KG/M2 | DIASTOLIC BLOOD PRESSURE: 79 MMHG | HEIGHT: 67 IN

## 2018-07-10 DIAGNOSIS — M54.32 SCIATICA OF LEFT SIDE: Primary | ICD-10-CM

## 2018-07-10 LAB
BILIRUBIN URINE: NEGATIVE
BLOOD, URINE: NEGATIVE
CLARITY: ABNORMAL
COLOR: YELLOW
GLUCOSE URINE: NEGATIVE MG/DL
KETONES, URINE: NEGATIVE MG/DL
LEUKOCYTE ESTERASE, URINE: NEGATIVE
NITRITE, URINE: NEGATIVE
PH UA: 6.5
PROTEIN UA: ABNORMAL MG/DL
SPECIFIC GRAVITY UA: 1.02
UROBILINOGEN, URINE: 1 E.U./DL

## 2018-07-10 PROCEDURE — 99284 EMERGENCY DEPT VISIT MOD MDM: CPT

## 2018-07-10 PROCEDURE — 81003 URINALYSIS AUTO W/O SCOPE: CPT

## 2018-07-10 PROCEDURE — 99283 EMERGENCY DEPT VISIT LOW MDM: CPT | Performed by: NURSE PRACTITIONER

## 2018-07-10 PROCEDURE — 72100 X-RAY EXAM L-S SPINE 2/3 VWS: CPT

## 2018-07-10 RX ORDER — PREDNISONE 20 MG/1
20 TABLET ORAL 2 TIMES DAILY
Qty: 10 TABLET | Refills: 0 | Status: SHIPPED | OUTPATIENT
Start: 2018-07-10 | End: 2018-07-15

## 2018-07-10 ASSESSMENT — PAIN SCALES - GENERAL: PAINLEVEL_OUTOF10: 6

## 2018-07-10 ASSESSMENT — ENCOUNTER SYMPTOMS
ABDOMINAL PAIN: 0
BACK PAIN: 1
BOWEL INCONTINENCE: 0

## 2018-07-10 NOTE — ED NOTES
Patient is alert and oriented x3  Respirations not labored  No apparent distress noted  Skin is WNL  Pt c/o low back pain radiating down left leg for several days, no abnormalities noted.    Pt monitored in place       [facility] [place]  07/10/18 6046

## 2018-07-10 NOTE — ED PROVIDER NOTES
 Acute sinusitis     Allergic reaction to spider bite     Anemia     Anticoagulated     Anxiety     Arrhythmia     Ataxic gait     Lyons's esophagus     Bowel obstruction     Callus     Cardiac pacemaker     CKD (chronic kidney disease) stage 2, GFR 60-89 ml/min     Coat's syndrome     Coat's syndrome     both eyes    Depression     Diabetes mellitus type 2 in nonobese (HCC)     Diabetic nephropathy (HCC)     Disequilibrium     Dizziness     DVT (deep venous thrombosis) (HCC)     Exudative retinopathy     Fibromyalgia     Fibromyositis     Gastric ulcer     GERD (gastroesophageal reflux disease)     History of gastric bypass     Hx of lupus anticoagulant disorder     Hypertension     Hypothyroidism     Intermittent claudication (HCC)     Intestinal obstruction     Iron deficiency     Left-sided weakness     Low vitamin D level     Lupus     Menopause     Obesity     Osteoarthritis     Osteoporosis     Pernicious anemia     PUPP (pruritic urticarial papules and plaques of pregnancy)     Right leg numbness     Right sided sciatica     Sarcoidosis (HCC)     with liver involvement    Sciatica     Secondary hyperparathyroidism (HCC)     Shingles     Shortness of breath     Sleep apnea     Stomach ulcer     Syncope     Visual loss, one eye          SURGICAL HISTORY       Past Surgical History:   Procedure Laterality Date    APPENDECTOMY      CARDIAC CATHETERIZATION  10/21/13  [address]    EF over 60%    CHOLECYSTECTOMY      COLONOSCOPY  2/2010    negative    COLONOSCOPY  2/22/10    Dr Luis Colón    COLONOSCOPY  4/1/16    Dr LAKHWINDER Horvath-internal hemorrhoids, 5 yr recall    EYE SURGERY      Cyst on Right eye    EYE SURGERY      GASTRIC BYPASS SURGERY      GASTRIC BYPASS SURGERY      HERNIA REPAIR      HYSTERECTOMY      Complete    HYSTERECTOMY      Partial - because had a tubal pregnancy.      INCONTINENCE SURGERY      Bladder Sling    OTHER SURGICAL HISTORY IVC filter    PACEMAKER INSERTION      PACEMAKER PLACEMENT      medtronic    GA TOTAL KNEE ARTHROPLASTY Right 3/26/2018    TOTAL KNEE REPLACEMENT COMPLEX PRIMARY performed by Macie Florez MD at [address]      SPLENECTOMY      RKISTEL AND BSO      TONSILLECTOMY AND ADENOIDECTOMY      UPPER GASTROINTESTINAL ENDOSCOPY  12/2011    gerd s/p gastric bypass    UPPER GASTROINTESTINAL ENDOSCOPY  2/2014    normal s/p gastric bypass    UPPER GASTROINTESTINAL ENDOSCOPY  2/2010    biopsy neg Barretts, chronic reflux esophagitis s/p gastric bypass    UPPER GASTROINTESTINAL ENDOSCOPY  7/2006    unremarkable s/p gastric bypass    UPPER GASTROINTESTINAL ENDOSCOPY  8/10/15    Dr Marc Winn ENDOSCOPY  4/1/16    Dr Lisa Infante N/A 4/1/2016    EGD ESOPHAGOGASTRODUODENOSCOPY performed by Barbara Pink MD at [address] [facility]    [facility] Right 2003         CURRENT MEDICATIONS       Discharge Medication List as of 7/10/2018  9:33 AM      CONTINUE these medications which have NOT CHANGED    Details   HYDROmorphone (DILAUDID) 2 MG tablet Take 2 mg by mouth every 3 hours as needed for Pain. Oleksandr Buckley Historical Med      warfarin (COUMADIN) 7.5 MG tablet Take 7.5 mg by mouth dailyHistorical Med      !! lisinopril (PRINIVIL;ZESTRIL) 20 MG tablet Take 1 tablet by mouth daily, Disp-30 tablet, R-3NO PRINT      pantoprazole (PROTONIX) 40 MG tablet Take 1 tablet by mouth 2 times daily, Disp-60 tablet, R-2Please consider 90 day supplies to promote better adherenceNO PRINT      potassium chloride (KLOR-CON M) 10 MEQ extended release tablet Take 1 tablet by mouth daily, Disp-30 tablet, R-3Normal      !! lisinopril (PRINIVIL;ZESTRIL) 5 MG tablet Take 4 tablets by mouth daily, Disp-30 tablet, R-3Normal      bumetanide (BUMEX) 1 MG tablet Take 1 mg by mouth dailyHistorical Med      Multiple Vitamins-Minerals (THERAPEUTIC MULTIVITAMIN-MINERALS) tablet Take 1 tablet by mouth dailyHistorical Med      rOPINIRole (REQUIP) 1 MG tablet Take 1 mg by mouth 3 times dailyHistorical Med      Gabapentin, Once-Daily, 300 MG TABS Take 300 mg by mouth 2 times daily as needed (leg pain). , Disp-30 tablet, R-1Print      clobetasol (TEMOVATE) 0.05 % cream Apply topically 2 times daily. , Disp-1 Tube, R-1, Normal      ondansetron (ZOFRAN) 4 MG tablet Take 1 tablet by mouth every 8 hours as needed for Nausea or Vomiting, Disp-30 tablet, R-1Normal      Ergocalciferol (VITAMIN D2 PO) Take 50,000 Units by mouthHistorical Med      sucralfate (CARAFATE) 1 GM/10ML suspension Take 1 g by mouth 4 times dailyHistorical Med      escitalopram (LEXAPRO) 10 MG tablet Historical Med      ranitidine (ZANTAC) 150 MG tablet Historical Med      levothyroxine (SYNTHROID) 100 MCG tablet Take by mouthHistorical Med      fluticasone-salmeterol (ADVAIR DISKUS) 250-50 MCG/DOSE AEPB Inhale 1 puff into the lungs every 12 hours. Historical Med      aspirin 81 MG EC tablet Take 81 mg by mouth daily. Historical Med      Multiple Vitamin (MULTIVITAMIN PO) Take 1 tablet by mouth daily Historical Med       !! - Potential duplicate medications found. Please discuss with provider. ALLERGIES     Insect extract allergy skin test; Lortab [hydrocodone-acetaminophen];  Other; Oxycodone-acetaminophen; and Ultram [tramadol hcl]    FAMILY HISTORY       Family History   Problem Relation Age of Onset    Uterine Cancer Mother     Cervical Cancer Mother     Coronary Art Dis Mother     Heart Disease Father     Lung Cancer Father     Other Father         renal failure    Colon Cancer Brother     Colon Polyps Brother     Uterine Cancer Maternal Grandmother     Cervical Cancer Maternal Grandmother     Diabetes Maternal Grandmother     Cervical Cancer Sister     Other Sister         fibromyalgia    Diabetes Paternal Aunt     Esophageal Cancer Neg Hx     Liver Cancer Neg Hx     Liver Disease Neg Hx     Stomach Cancer Neg Hx     Rectal Cancer Neg Hx           SOCIAL HISTORY       Social History     Social History    Marital status:      Spouse name: N/A    Number of children: N/A    Years of education: N/A     Social History Main Topics    Smoking status: Never Smoker    Smokeless tobacco: Never Used    Alcohol use No    Drug use: No    Sexual activity: Not Currently     Other Topics Concern    None     Social History Narrative    None       SCREENINGS             PHYSICAL EXAM    (up to 7 for level 4, 8 or more for level 5)     ED Triage Vitals [07/10/18 0806]   BP Temp Temp src Pulse Resp SpO2 Height Weight   (!) 156/90 98.2 °F (36.8 °C) -- 69 20 95 % 5' 7\" (1.702 m) 230 lb (104.3 kg)       Physical Exam   Constitutional: She is oriented to person, place, and time. She appears well-developed and well-nourished. HENT:   Head: Normocephalic and atraumatic. Eyes: Right eye exhibits no discharge. Left eye exhibits no discharge. No scleral icterus. Neck: Normal range of motion. Neck supple. Cardiovascular: Normal rate. Pulmonary/Chest: No respiratory distress. Musculoskeletal:        Lumbar back: She exhibits tenderness. She exhibits normal range of motion, no bony tenderness, no swelling, no edema, no deformity, no laceration and no pain. Back:    Neurological: She is alert and oriented to person, place, and time. Psychiatric: She has a normal mood and affect. Her behavior is normal.   Nursing note and vitals reviewed.       DIAGNOSTIC RESULTS     EKG: All EKG's are interpreted by the Emergency Department Physician who either signs or Co-signs this chart in the absence of a cardiologist.        RADIOLOGY:   Non-plain film images such as CT, Ultrasound and MRI are read by the radiologist. Plain radiographic images are visualized and preliminarily interpreted by the emergency physician with the below findings:      Interpretation per the Radiologist below, if available at the time of this note:    XR LUMBAR SPINE (2-3 VIEWS)   Final Result   1. Mild degenerative lumbar spine changes with no acute bony   abnormality. Signed by Dr Karley Ramos on 7/10/2018 8:58 AM            ED BEDSIDE ULTRASOUND:   Performed by ED Physician - none    LABS:  Labs Reviewed   URINALYSIS - Abnormal; Notable for the following:        Result Value    Clarity, UA CLOUDY (*)     Protein, UA TRACE (*)     All other components within normal limits       All other labs were within normal range or not returned as of this dictation. EMERGENCY DEPARTMENT COURSE and DIFFERENTIAL DIAGNOSIS/MDM:   Vitals:    Vitals:    07/10/18 0806 07/10/18 0946   BP: (!) 156/90 (!) 154/79   Pulse: 69 67   Resp: 20 17   Temp: 98.2 °F (36.8 °C) 98.2 °F (36.8 °C)   TempSrc:  Oral   SpO2: 95% 96%   Weight: 230 lb (104.3 kg)    Height: 5' 7\" (1.702 m)            MDM  Pt has some diludid pills left from her knee surgery. Will need to follow up with ortho if not getting any better      CONSULTS:  None    PROCEDURES:  Unless otherwise noted below, none     Procedures    FINAL IMPRESSION      1. Sciatica of left side          DISPOSITION/PLAN   DISPOSITION Decision To Discharge 07/10/2018 09:29:26 AM      PATIENT REFERRED TO:  Hillary Wray MD  [facility] [address] Shikha Hinkle [address] [facility] [phone]            DISCHARGE MEDICATIONS:  Discharge Medication List as of 7/10/2018  9:33 AM      START taking these medications    Details   predniSONE (DELTASONE) 20 MG tablet Take 1 tablet by mouth 2 times daily for 5 days, Disp-10 tablet, R-0Print           Attestation: The Prescription Monitoring Report for this patient was reviewed today. CHANELLE Mosley)  Documentation: No signs of potential drug abuse or diversion identified.  ([id number]  pt states she has diludid at home from march) CHANELLE Mosley)    (Please note that portions of this note were completed with a voice recognition program. Efforts were made to edit the dictations but occasionally words are mis-transcribed.)    CHANELLE Álvarez (electronically signed)          CHANELLE Álvarez  07/10/18 1048

## 2018-07-12 ENCOUNTER — NURSE ONLY (OUTPATIENT)
Dept: INTERNAL MEDICINE | Age: 68
End: 2018-07-12
Payer: MEDICARE

## 2018-07-12 ENCOUNTER — OFFICE VISIT (OUTPATIENT)
Dept: INTERNAL MEDICINE | Age: 68
End: 2018-07-12
Payer: MEDICARE

## 2018-07-12 VITALS
TEMPERATURE: 97 F | OXYGEN SATURATION: 98 % | HEART RATE: 78 BPM | WEIGHT: 219 LBS | HEIGHT: 67 IN | DIASTOLIC BLOOD PRESSURE: 74 MMHG | SYSTOLIC BLOOD PRESSURE: 122 MMHG | BODY MASS INDEX: 34.37 KG/M2

## 2018-07-12 DIAGNOSIS — Z79.01 ENCOUNTER FOR CURRENT LONG-TERM USE OF ANTICOAGULANTS: ICD-10-CM

## 2018-07-12 DIAGNOSIS — Z86.2 H/O LUPUS ANTICOAGULANT DISORDER: ICD-10-CM

## 2018-07-12 DIAGNOSIS — J98.4 RESTRICTIVE AIRWAY DISEASE: ICD-10-CM

## 2018-07-12 DIAGNOSIS — M54.42 ACUTE LEFT-SIDED LOW BACK PAIN WITH LEFT-SIDED SCIATICA: Primary | ICD-10-CM

## 2018-07-12 DIAGNOSIS — Z79.01 ENCOUNTER FOR CURRENT LONG-TERM USE OF ANTICOAGULANTS: Primary | ICD-10-CM

## 2018-07-12 LAB
INTERNATIONAL NORMALIZATION RATIO, POC: 1
PROTHROMBIN TIME, POC: NORMAL

## 2018-07-12 PROCEDURE — 1036F TOBACCO NON-USER: CPT | Performed by: PHYSICIAN ASSISTANT

## 2018-07-12 PROCEDURE — 1090F PRES/ABSN URINE INCON ASSESS: CPT | Performed by: PHYSICIAN ASSISTANT

## 2018-07-12 PROCEDURE — G8427 DOCREV CUR MEDS BY ELIG CLIN: HCPCS | Performed by: PHYSICIAN ASSISTANT

## 2018-07-12 PROCEDURE — 3017F COLORECTAL CA SCREEN DOC REV: CPT | Performed by: PHYSICIAN ASSISTANT

## 2018-07-12 PROCEDURE — G8400 PT W/DXA NO RESULTS DOC: HCPCS | Performed by: PHYSICIAN ASSISTANT

## 2018-07-12 PROCEDURE — 4040F PNEUMOC VAC/ADMIN/RCVD: CPT | Performed by: PHYSICIAN ASSISTANT

## 2018-07-12 PROCEDURE — 85610 PROTHROMBIN TIME: CPT | Performed by: INTERNAL MEDICINE

## 2018-07-12 PROCEDURE — G8598 ASA/ANTIPLAT THER USED: HCPCS | Performed by: PHYSICIAN ASSISTANT

## 2018-07-12 PROCEDURE — 99213 OFFICE O/P EST LOW 20 MIN: CPT | Performed by: PHYSICIAN ASSISTANT

## 2018-07-12 PROCEDURE — 1101F PT FALLS ASSESS-DOCD LE1/YR: CPT | Performed by: PHYSICIAN ASSISTANT

## 2018-07-12 PROCEDURE — G8417 CALC BMI ABV UP PARAM F/U: HCPCS | Performed by: PHYSICIAN ASSISTANT

## 2018-07-12 PROCEDURE — 1123F ACP DISCUSS/DSCN MKR DOCD: CPT | Performed by: PHYSICIAN ASSISTANT

## 2018-07-12 RX ORDER — TIZANIDINE 4 MG/1
4 TABLET ORAL 3 TIMES DAILY
Qty: 30 TABLET | Refills: 1 | Status: ON HOLD | OUTPATIENT
Start: 2018-07-12 | End: 2018-07-25 | Stop reason: CLARIF

## 2018-07-12 RX ORDER — ALBUTEROL SULFATE 90 UG/1
2 AEROSOL, METERED RESPIRATORY (INHALATION) EVERY 6 HOURS PRN
Qty: 1 INHALER | Refills: 3 | Status: SHIPPED | OUTPATIENT
Start: 2018-07-12 | End: 2018-08-07 | Stop reason: SDUPTHER

## 2018-07-12 RX ORDER — CALCIPOTRIENE 50 UG/G
CREAM TOPICAL
COMMUNITY
Start: 2018-07-03 | End: 2018-08-07 | Stop reason: SDUPTHER

## 2018-07-12 ASSESSMENT — ENCOUNTER SYMPTOMS
EYE REDNESS: 0
VOMITING: 0
COLOR CHANGE: 0
CONSTIPATION: 0
BACK PAIN: 1
RHINORRHEA: 0
EYE PAIN: 0
NAUSEA: 0
WHEEZING: 0
COUGH: 0
SORE THROAT: 0
CHEST TIGHTNESS: 0
ABDOMINAL PAIN: 0
DIARRHEA: 0
SINUS PRESSURE: 0
SHORTNESS OF BREATH: 1
PHOTOPHOBIA: 0

## 2018-07-12 NOTE — PROGRESS NOTES
[facility] INTERNAL MEDICINE  [address] [facility] [address]  Dept: [phone]  Dept Fax: [phone]: [phone]      [facility] INTERNAL MEDICINE  OFFICE NOTE      Chief Complaint   Patient presents with    Other     pt states shes in pain around but to pelvis area that shoots down legs, pt states she had knee surgery 4 months ago and is getting ready to have surgery again on 8/20. Also having shortness of breath       Chris Child is a 70y.o. year old female who is seen for evaluation Of left-sided sciatic pain. Patient states for the last week has been having increase left-sided lower back pain. Patient states it shoots into her left box and sometimes will wrap around to her pelvis and down to her feet. Patient states she was seen in emergency room 2 days ago and was started on prednisone 20 mg twice a day for 5 days. Patient states still having the pain. Patient states that she was being treated for some back pain but physical therapy for she had a previous knee surgery and it did help. Patient denies any specific trauma. Patient states feels tight and spasms at time and makes it hard for her to walk. Patient denies any bowel or bladder abnormalities. Patient states pain sometimes on the right leg to. Patient had a x-ray of the lumbar spine and it showed some mild degenerative disc disease. Patient states has seen pain management but did not want any injections so she never went back. Patient states does have fibromyalgia. Patient states has not been taking her Coumadin until she went 2 days ago and restarted it. Patient states she didn't have enough money to buy her Coumadin. Patient states has never tried chiropractor. Patient states she does have some reactive airway disease. Patient states she uses her Advair inhaler twice a day but she needs another refill on her albuterol. Patient's oxygenation was 98% in office.   No wheezing noted.  No coughing. No fever. She denies any chest pain.     Active Ambulatory Problems     Diagnosis Date Noted    Vomiting 05/02/2014    Burping 05/02/2014    GERD (gastroesophageal reflux disease) 05/02/2014    History of gastric bypass 05/02/2014    Family history of colon cancer 05/02/2014    Bloating 05/02/2014    Enuresis 08/28/2014    Nausea and vomiting     Colon cancer screening     Stable angina ([facility] [address]     Encounter for current long-term use of anticoagulants 02/22/2018    Primary osteoarthritis of right knee 03/22/2018    Arthritis of knee 03/26/2018    Essential hypertension 03/26/2018    Hyperglycemia 03/26/2018    MER (obstructive sleep apnea) 03/26/2018    Slow transit constipation 03/26/2018    Iron deficiency anemia 03/26/2018    Restrictive airway disease 03/27/2018     Resolved Ambulatory Problems     Diagnosis Date Noted    No Resolved Ambulatory Problems     Past Medical History:   Diagnosis Date    Abdominal pain     Abnormal EKG     Acute sinusitis     Allergic reaction to spider bite     Anemia     Anticoagulated     Anxiety     Arrhythmia     Ataxic gait     Lyons's esophagus     Bowel obstruction     Callus     Cardiac pacemaker     CKD (chronic kidney disease) stage 2, GFR 60-89 ml/min     Coat's syndrome     Coat's syndrome     Depression     Diabetes mellitus type 2 in nonobese (HCC)     Diabetic nephropathy (HCC)     Disequilibrium     Dizziness     D[facility]) ([facility])     Exudative retinopathy     Fibromyalgia     Fibromyositis     Gastric ulcer     GERD (gastroesophageal reflux disease)     History of gastric bypass     Hx of lupus anticoagulant disorder     Hypertension     Hypothyroidism     Intermittent claudication (HCC)     Intestinal obstruction     Iron deficiency     Left-sided weakness     Low vitamin D level     Lupus     Menopause     Obesity     Osteoarthritis     Osteoporosis     Pernicious TWAN Cosby   albuterol sulfate HFA (VENTOLIN HFA) 108 (90 Base) MCG/ACT inhaler Inhale 2 puffs into the lungs every 6 hours as needed for Wheezing Yes TWAN Cai   predniSONE (DELTASONE) 20 MG tablet Take 1 tablet by mouth 2 times daily for 5 days Yes CHANELLE Camejo   HYDROmorphone (DILAUDID) 2 MG tablet Take 2 mg by mouth every 3 hours as needed for Pain. . Yes Historical Provider, MD   warfarin (COUMADIN) 7.5 MG tablet Take 7.5 mg by mouth daily Yes Historical Provider, MD   lisinopril (PRINIVIL;ZESTRIL) 20 MG tablet Take 1 tablet by mouth daily Yes CHANELLE Franco   pantoprazole (PROTONIX) 40 MG tablet Take 1 tablet by mouth 2 times daily Yes CHANELLE Franco   potassium chloride (KLOR-CON M) 10 MEQ extended release tablet Take 1 tablet by mouth daily Yes Aletha Panda MD   lisinopril (PRINIVIL;ZESTRIL) 5 MG tablet Take 4 tablets by mouth daily Yes Aletha Panda MD   bumetanide (BUMEX) 1 MG tablet Take 1 mg by mouth daily Yes Historical Provider, MD   Multiple Vitamins-Minerals (THERAPEUTIC MULTIVITAMIN-MINERALS) tablet Take 1 tablet by mouth daily Yes Historical Provider, MD   rOPINIRole (REQUIP) 1 MG tablet Take 1 mg by mouth 3 times daily Yes Historical Provider, MD   Gabapentin, Once-Daily, 300 MG TABS Take 300 mg by mouth 2 times daily as needed (leg pain). Yes Aletha Panda MD   clobetasol (TEMOVATE) 0.05 % cream Apply topically 2 times daily.  Yes Aletha Panda MD   ondansetron (ZOFRAN) 4 MG tablet Take 1 tablet by mouth every 8 hours as needed for Nausea or Vomiting Yes Aletha Panda MD   Ergocalciferol (VITAMIN D2 PO) Take 50,000 Units by mouth Yes Historical Provider, MD   sucralfate (CARAFATE) 1 GM/10ML suspension Take 1 g by mouth 4 times daily Yes Historical Provider, MD   escitalopram (LEXAPRO) 10 MG tablet  Yes Historical Provider, MD   ranitidine (ZANTAC) 150 MG tablet  Yes Historical Provider, MD   levothyroxine (SYNTHROID) 100 MCG tablet Take by mouth Yes swelling. Gastrointestinal: Negative for abdominal pain, constipation, diarrhea, nausea and vomiting. Genitourinary: Positive for pelvic pain. Negative for dysuria, frequency, hematuria and urgency. Musculoskeletal: Positive for back pain. Negative for arthralgias, gait problem, joint swelling and myalgias. Skin: Negative for color change, pallor and wound. Neurological: Negative for dizziness, facial asymmetry, speech difficulty, weakness and light-headedness. Hematological: Negative for adenopathy. Does not bruise/bleed easily. Psychiatric/Behavioral: Negative for confusion. The patient is not nervous/anxious. Current Outpatient Prescriptions   Medication Sig Dispense Refill    calcipotriene (DOVONEX) 0.005 % cream       tiZANidine (ZANAFLEX) 4 MG tablet Take 1 tablet by mouth 3 times daily 30 tablet 1    albuterol sulfate HFA (VENTOLIN HFA) 108 (90 Base) MCG/ACT inhaler Inhale 2 puffs into the lungs every 6 hours as needed for Wheezing 1 Inhaler 3    predniSONE (DELTASONE) 20 MG tablet Take 1 tablet by mouth 2 times daily for 5 days 10 tablet 0    HYDROmorphone (DILAUDID) 2 MG tablet Take 2 mg by mouth every 3 hours as needed for Pain. Kvng Scott warfarin (COUMADIN) 7.5 MG tablet Take 7.5 mg by mouth daily      lisinopril (PRINIVIL;ZESTRIL) 20 MG tablet Take 1 tablet by mouth daily 30 tablet 3    pantoprazole (PROTONIX) 40 MG tablet Take 1 tablet by mouth 2 times daily 60 tablet 2    potassium chloride (KLOR-CON M) 10 MEQ extended release tablet Take 1 tablet by mouth daily 30 tablet 3    lisinopril (PRINIVIL;ZESTRIL) 5 MG tablet Take 4 tablets by mouth daily 30 tablet 3    bumetanide (BUMEX) 1 MG tablet Take 1 mg by mouth daily      Multiple Vitamins-Minerals (THERAPEUTIC MULTIVITAMIN-MINERALS) tablet Take 1 tablet by mouth daily      rOPINIRole (REQUIP) 1 MG tablet Take 1 mg by mouth 3 times daily      Gabapentin, Once-Daily, 300 MG TABS Take 300 mg by mouth 2 times daily as needed (leg pain). 30 tablet 1    clobetasol (TEMOVATE) 0.05 % cream Apply topically 2 times daily. 1 Tube 1    ondansetron (ZOFRAN) 4 MG tablet Take 1 tablet by mouth every 8 hours as needed for Nausea or Vomiting 30 tablet 1    Ergocalciferol (VITAMIN D2 PO) Take 50,000 Units by mouth      sucralfate (CARAFATE) 1 GM/10ML suspension Take 1 g by mouth 4 times daily      escitalopram (LEXAPRO) 10 MG tablet       ranitidine (ZANTAC) 150 MG tablet       levothyroxine (SYNTHROID) 100 MCG tablet Take by mouth      fluticasone-salmeterol (ADVAIR DISKUS) 250-50 MCG/DOSE AEPB Inhale 1 puff into the lungs every 12 hours.  aspirin 81 MG EC tablet Take 81 mg by mouth daily.  Multiple Vitamin (MULTIVITAMIN PO) Take 1 tablet by mouth daily        No current facility-administered medications for this visit. /74   Pulse 78   Temp 97 °F (36.1 °C)   Ht 5' 7\" (1.702 m)   Wt 219 lb (99.3 kg)   SpO2 98%   BMI 34.30 kg/m²     PHYSICAL EXAM  Physical Exam   Constitutional: Vital signs are normal. She appears well-developed and well-nourished. HENT:   Head: Normocephalic and atraumatic. Right Ear: External ear normal.   Left Ear: External ear normal.   Nose: Nose normal.   Mouth/Throat: Oropharynx is clear and moist. No oropharyngeal exudate. Eyes: Pupils are equal, round, and reactive to light. Right eye exhibits no discharge. Left eye exhibits no discharge. Neck: Normal range of motion. No tracheal deviation present. Cardiovascular: Normal rate, regular rhythm and normal heart sounds. Exam reveals no gallop and no friction rub. No murmur heard. Pulmonary/Chest: Effort normal and breath sounds normal. No respiratory distress. She has no wheezes. She has no rales. She exhibits no tenderness. Abdominal: Soft. Bowel sounds are normal. There is no tenderness. There is no rebound and no guarding. Musculoskeletal: Normal range of motion. She exhibits no deformity.         Lumbar back: try to go see a chiropractor and see if they can help. Patient refused to go see pain management. We went ahead and refilled patient's albuterol and told patient to use it as needed for shortness of breath. Patient was oxygenating at 98%. Patient saw April in the Coumadin clinic today and patient has not been taking her Coumadin she states because she couldn't afford it. Patient states she has again started 2 days ago she will take 15 mg for 2 days and then resume 7.5 daily. Patient has a machine at home and she should check her INR at least once a week to make sure that she  the desirable range. Patient will follow-up as needed for increased pain, weakness, numbness, chest pain, shortness breath or as needed if not improving. Return if symptoms worsen or fail to improve. All questions answered. Patient voices understanding and agrees to plans along with risks and benefits of plan. Patient is instructed to continue prior medications, diet, and exercise plans as instructed. Patient agrees to follow up as instructions, sooner if needed, or to go to ER if condition becomes emergent. Additional Instructions: As always, patient is advised to bring in medication bottles in order to correctly reconcile with our current list.      Monica LEONE Dragon/transcription disclaimer: Much of this encounter note is electronic transcription/translation of spoken language to printed text. The electronic translation of spoken language may be erroneous, or at times, nonsensical words or phrases may be inadvertently transcribed.  Although I have reviewed the note for such errors, some may still exist.

## 2018-07-13 ENCOUNTER — APPOINTMENT (OUTPATIENT)
Dept: CT IMAGING | Age: 68
End: 2018-07-13
Payer: MEDICARE

## 2018-07-13 ENCOUNTER — HOSPITAL ENCOUNTER (EMERGENCY)
Age: 68
Discharge: HOME | End: 2018-07-13
Payer: MEDICARE

## 2018-07-13 ENCOUNTER — APPOINTMENT (OUTPATIENT)
Dept: GENERAL RADIOLOGY | Age: 68
End: 2018-07-13
Payer: MEDICARE

## 2018-07-13 VITALS
OXYGEN SATURATION: 94 % | WEIGHT: 216 LBS | TEMPERATURE: 98.2 F | SYSTOLIC BLOOD PRESSURE: 156 MMHG | BODY MASS INDEX: 33.9 KG/M2 | HEART RATE: 65 BPM | DIASTOLIC BLOOD PRESSURE: 88 MMHG | RESPIRATION RATE: 16 BRPM | HEIGHT: 67 IN

## 2018-07-13 DIAGNOSIS — S00.83XA CONTUSION OF FACE, INITIAL ENCOUNTER: ICD-10-CM

## 2018-07-13 DIAGNOSIS — T50.905A ADVERSE EFFECT OF DRUG, INITIAL ENCOUNTER: ICD-10-CM

## 2018-07-13 DIAGNOSIS — R55 SYNCOPE AND COLLAPSE: Primary | ICD-10-CM

## 2018-07-13 DIAGNOSIS — R79.89 ELEVATED SERUM CREATININE: ICD-10-CM

## 2018-07-13 DIAGNOSIS — D64.9 ANEMIA, UNSPECIFIED TYPE: ICD-10-CM

## 2018-07-13 LAB
ALBUMIN SERPL-MCNC: 3.8 G/DL (ref 3.5–5.2)
ALP BLD-CCNC: 79 U/L (ref 35–104)
ALT SERPL-CCNC: 7 U/L (ref 5–33)
ANION GAP SERPL CALCULATED.3IONS-SCNC: 12 MMOL/L (ref 7–19)
APTT: 25.2 SEC (ref 26–36.2)
AST SERPL-CCNC: 20 U/L (ref 5–32)
BASOPHILS ABSOLUTE: 0 K/UL (ref 0–0.2)
BASOPHILS RELATIVE PERCENT: 0.2 % (ref 0–1)
BILIRUB SERPL-MCNC: 0.3 MG/DL (ref 0.2–1.2)
BUN BLDV-MCNC: 26 MG/DL (ref 8–23)
CALCIUM SERPL-MCNC: 8.8 MG/DL (ref 8.8–10.2)
CHLORIDE BLD-SCNC: 104 MMOL/L (ref 98–111)
CO2: 23 MMOL/L (ref 22–29)
CREAT SERPL-MCNC: 1.7 MG/DL (ref 0.5–0.9)
EOSINOPHILS ABSOLUTE: 0 K/UL (ref 0–0.6)
EOSINOPHILS RELATIVE PERCENT: 0.2 % (ref 0–5)
GFR NON-AFRICAN AMERICAN: 30
GLUCOSE BLD-MCNC: 110 MG/DL (ref 74–109)
HCT VFR BLD CALC: 29.8 % (ref 37–47)
HEMOGLOBIN: 9.3 G/DL (ref 12–16)
INR BLD: 1.27 (ref 0.88–1.18)
LYMPHOCYTES ABSOLUTE: 1 K/UL (ref 1.1–4.5)
LYMPHOCYTES RELATIVE PERCENT: 9.6 % (ref 20–40)
MCH RBC QN AUTO: 27.5 PG (ref 27–31)
MCHC RBC AUTO-ENTMCNC: 31.2 G/DL (ref 33–37)
MCV RBC AUTO: 88.2 FL (ref 81–99)
MONOCYTES ABSOLUTE: 0.9 K/UL (ref 0–0.9)
MONOCYTES RELATIVE PERCENT: 8.6 % (ref 0–10)
NEUTROPHILS ABSOLUTE: 8.5 K/UL (ref 1.5–7.5)
NEUTROPHILS RELATIVE PERCENT: 80.7 % (ref 50–65)
PDW BLD-RTO: 15.2 % (ref 11.5–14.5)
PLATELET # BLD: 116 K/UL (ref 130–400)
PMV BLD AUTO: 11.7 FL (ref 9.4–12.3)
POTASSIUM SERPL-SCNC: 4.3 MMOL/L (ref 3.5–5)
PROTHROMBIN TIME: 15.8 SEC (ref 12–14.6)
RBC # BLD: 3.38 M/UL (ref 4.2–5.4)
SODIUM BLD-SCNC: 139 MMOL/L (ref 136–145)
TOTAL PROTEIN: 7.7 G/DL (ref 6.6–8.7)
TROPONIN: <0.01 NG/ML (ref 0–0.03)
WBC # BLD: 10.6 K/UL (ref 4.8–10.8)

## 2018-07-13 PROCEDURE — 93005 ELECTROCARDIOGRAM TRACING: CPT

## 2018-07-13 PROCEDURE — 80053 COMPREHEN METABOLIC PANEL: CPT

## 2018-07-13 PROCEDURE — 70486 CT MAXILLOFACIAL W/O DYE: CPT

## 2018-07-13 PROCEDURE — 85025 COMPLETE CBC W/AUTO DIFF WBC: CPT

## 2018-07-13 PROCEDURE — 36415 COLL VENOUS BLD VENIPUNCTURE: CPT

## 2018-07-13 PROCEDURE — 85610 PROTHROMBIN TIME: CPT

## 2018-07-13 PROCEDURE — 84484 ASSAY OF TROPONIN QUANT: CPT

## 2018-07-13 PROCEDURE — 72125 CT NECK SPINE W/O DYE: CPT

## 2018-07-13 PROCEDURE — 99285 EMERGENCY DEPT VISIT HI MDM: CPT | Performed by: PHYSICIAN ASSISTANT

## 2018-07-13 PROCEDURE — 85730 THROMBOPLASTIN TIME PARTIAL: CPT

## 2018-07-13 PROCEDURE — 99284 EMERGENCY DEPT VISIT MOD MDM: CPT

## 2018-07-13 PROCEDURE — 71045 X-RAY EXAM CHEST 1 VIEW: CPT

## 2018-07-13 PROCEDURE — 2580000003 HC RX 258: Performed by: PHYSICIAN ASSISTANT

## 2018-07-13 PROCEDURE — 70450 CT HEAD/BRAIN W/O DYE: CPT

## 2018-07-13 RX ORDER — 0.9 % SODIUM CHLORIDE 0.9 %
1000 INTRAVENOUS SOLUTION INTRAVENOUS ONCE
Status: COMPLETED | OUTPATIENT
Start: 2018-07-13 | End: 2018-07-13

## 2018-07-13 RX ADMIN — SODIUM CHLORIDE 1000 ML: 9 INJECTION, SOLUTION INTRAVENOUS at 19:42

## 2018-07-13 ASSESSMENT — ENCOUNTER SYMPTOMS
ABDOMINAL PAIN: 0
WHEEZING: 0
EYE PAIN: 0
COUGH: 0
VOMITING: 0
SINUS PRESSURE: 0
DIARRHEA: 0
SORE THROAT: 0
CONSTIPATION: 0
NAUSEA: 0
RHINORRHEA: 0
ABDOMINAL DISTENTION: 0
SHORTNESS OF BREATH: 0
APNEA: 0
CHEST TIGHTNESS: 0

## 2018-07-13 NOTE — ED NOTES
Report from [facility].      [address] [facility]  07/13/18 0834

## 2018-07-14 NOTE — ED NOTES
Pacemaker interrogated, orthostatic BP completed. Patient states no dizziness on standing, unable to urinate at this time but will try when fluids are finished or when she feels able. Family at bedside and call light in reach.      [address] [facility], [address] [place] [address]  07/13/18 2003

## 2018-07-17 ENCOUNTER — TELEPHONE (OUTPATIENT)
Dept: INTERNAL MEDICINE | Age: 68
End: 2018-07-17

## 2018-07-18 LAB
EKG P AXIS: 18 DEGREES
EKG P-R INTERVAL: 126 MS
EKG Q-T INTERVAL: 436 MS
EKG QRS DURATION: 100 MS
EKG QTC CALCULATION (BAZETT): 436 MS
EKG T AXIS: -4 DEGREES

## 2018-07-19 ENCOUNTER — TELEPHONE (OUTPATIENT)
Dept: INTERNAL MEDICINE | Age: 68
End: 2018-07-19

## 2018-07-19 ENCOUNTER — HOSPITAL ENCOUNTER (OUTPATIENT)
Dept: GENERAL RADIOLOGY | Age: 68
Discharge: HOME | End: 2018-07-19
Payer: MEDICARE

## 2018-07-19 ENCOUNTER — HOSPITAL ENCOUNTER (OUTPATIENT)
Dept: VASCULAR LAB | Age: 68
Discharge: HOME | End: 2018-07-19
Payer: MEDICARE

## 2018-07-19 ENCOUNTER — OFFICE VISIT (OUTPATIENT)
Dept: INTERNAL MEDICINE | Age: 68
End: 2018-07-19
Payer: MEDICARE

## 2018-07-19 VITALS
SYSTOLIC BLOOD PRESSURE: 148 MMHG | BODY MASS INDEX: 33.9 KG/M2 | WEIGHT: 216 LBS | OXYGEN SATURATION: 99 % | DIASTOLIC BLOOD PRESSURE: 80 MMHG | HEIGHT: 67 IN | HEART RATE: 62 BPM

## 2018-07-19 DIAGNOSIS — M79.642 PAIN OF LEFT HAND: ICD-10-CM

## 2018-07-19 DIAGNOSIS — R60.9 EDEMA, UNSPECIFIED TYPE: ICD-10-CM

## 2018-07-19 DIAGNOSIS — D68.59 HYPERCOAGULABLE STATE (HCC): ICD-10-CM

## 2018-07-19 DIAGNOSIS — R60.9 EDEMA, UNSPECIFIED TYPE: Primary | ICD-10-CM

## 2018-07-19 DIAGNOSIS — G62.9 NEUROPATHY: ICD-10-CM

## 2018-07-19 DIAGNOSIS — R19.7 DIARRHEA, UNSPECIFIED TYPE: ICD-10-CM

## 2018-07-19 DIAGNOSIS — Q74.0: ICD-10-CM

## 2018-07-19 DIAGNOSIS — Q87.89: ICD-10-CM

## 2018-07-19 LAB
ALBUMIN SERPL-MCNC: 4 G/DL (ref 3.5–5.2)
ALP BLD-CCNC: 88 U/L (ref 35–104)
ALT SERPL-CCNC: 7 U/L (ref 5–33)
ANION GAP SERPL CALCULATED.3IONS-SCNC: 13 MMOL/L (ref 7–19)
AST SERPL-CCNC: 18 U/L (ref 5–32)
BASOPHILS ABSOLUTE: 0 K/UL (ref 0–0.2)
BASOPHILS RELATIVE PERCENT: 0.4 % (ref 0–1)
BILIRUB SERPL-MCNC: 0.4 MG/DL (ref 0.2–1.2)
BUN BLDV-MCNC: 21 MG/DL (ref 8–23)
CALCIUM SERPL-MCNC: 9.1 MG/DL (ref 8.8–10.2)
CHLORIDE BLD-SCNC: 106 MMOL/L (ref 98–111)
CO2: 23 MMOL/L (ref 22–29)
CREAT SERPL-MCNC: 1 MG/DL (ref 0.5–0.9)
D DIMER: >20 UG/ML FEU (ref 0–0.48)
EOSINOPHILS ABSOLUTE: 0.1 K/UL (ref 0–0.6)
EOSINOPHILS RELATIVE PERCENT: 1.8 % (ref 0–5)
GFR NON-AFRICAN AMERICAN: 55
GLUCOSE BLD-MCNC: 51 MG/DL (ref 74–109)
HCT VFR BLD CALC: 32.3 % (ref 37–47)
HEMOGLOBIN: 9.8 G/DL (ref 12–16)
INR BLD: 1.2 (ref 0.88–1.18)
LYMPHOCYTES ABSOLUTE: 1.3 K/UL (ref 1.1–4.5)
LYMPHOCYTES RELATIVE PERCENT: 18.2 % (ref 20–40)
MCH RBC QN AUTO: 27.5 PG (ref 27–31)
MCHC RBC AUTO-ENTMCNC: 30.3 G/DL (ref 33–37)
MCV RBC AUTO: 90.7 FL (ref 81–99)
MONOCYTES ABSOLUTE: 0.6 K/UL (ref 0–0.9)
MONOCYTES RELATIVE PERCENT: 8.7 % (ref 0–10)
NEUTROPHILS ABSOLUTE: 5.2 K/UL (ref 1.5–7.5)
NEUTROPHILS RELATIVE PERCENT: 70.4 % (ref 50–65)
PDW BLD-RTO: 15.5 % (ref 11.5–14.5)
PLATELET # BLD: 143 K/UL (ref 130–400)
PMV BLD AUTO: 14.4 FL (ref 9.4–12.3)
POTASSIUM SERPL-SCNC: 4.3 MMOL/L (ref 3.5–5)
PROTHROMBIN TIME: 15.1 SEC (ref 12–14.6)
RBC # BLD: 3.56 M/UL (ref 4.2–5.4)
SODIUM BLD-SCNC: 142 MMOL/L (ref 136–145)
TOTAL PROTEIN: 8.1 G/DL (ref 6.6–8.7)
WBC # BLD: 7.4 K/UL (ref 4.8–10.8)

## 2018-07-19 PROCEDURE — 3017F COLORECTAL CA SCREEN DOC REV: CPT | Performed by: INTERNAL MEDICINE

## 2018-07-19 PROCEDURE — G8400 PT W/DXA NO RESULTS DOC: HCPCS | Performed by: INTERNAL MEDICINE

## 2018-07-19 PROCEDURE — 73140 X-RAY EXAM OF FINGER(S): CPT

## 2018-07-19 PROCEDURE — G8417 CALC BMI ABV UP PARAM F/U: HCPCS | Performed by: INTERNAL MEDICINE

## 2018-07-19 PROCEDURE — G8598 ASA/ANTIPLAT THER USED: HCPCS | Performed by: INTERNAL MEDICINE

## 2018-07-19 PROCEDURE — 1101F PT FALLS ASSESS-DOCD LE1/YR: CPT | Performed by: INTERNAL MEDICINE

## 2018-07-19 PROCEDURE — G8427 DOCREV CUR MEDS BY ELIG CLIN: HCPCS | Performed by: INTERNAL MEDICINE

## 2018-07-19 PROCEDURE — 93971 EXTREMITY STUDY: CPT

## 2018-07-19 PROCEDURE — 1123F ACP DISCUSS/DSCN MKR DOCD: CPT | Performed by: INTERNAL MEDICINE

## 2018-07-19 PROCEDURE — 1036F TOBACCO NON-USER: CPT | Performed by: INTERNAL MEDICINE

## 2018-07-19 PROCEDURE — 4040F PNEUMOC VAC/ADMIN/RCVD: CPT | Performed by: INTERNAL MEDICINE

## 2018-07-19 PROCEDURE — 93970 EXTREMITY STUDY: CPT

## 2018-07-19 PROCEDURE — 99214 OFFICE O/P EST MOD 30 MIN: CPT | Performed by: INTERNAL MEDICINE

## 2018-07-19 PROCEDURE — 1090F PRES/ABSN URINE INCON ASSESS: CPT | Performed by: INTERNAL MEDICINE

## 2018-07-19 RX ORDER — SUCRALFATE ORAL 1 G/10ML
1 SUSPENSION ORAL 4 TIMES DAILY
Qty: 1200 ML | Refills: 2 | Status: SHIPPED | OUTPATIENT
Start: 2018-07-19 | End: 2018-08-07 | Stop reason: SDUPTHER

## 2018-07-19 NOTE — TELEPHONE ENCOUNTER
[facility] states they need rosalba lab orders put in for patient's ultrasounds. The diagnosis for the US on her left arm needs to be arm swelling and the one for her legs (bilateral) needs to be for leg swelling.

## 2018-07-20 ENCOUNTER — TELEPHONE (OUTPATIENT)
Dept: INTERNAL MEDICINE | Age: 68
End: 2018-07-20

## 2018-07-20 DIAGNOSIS — M79.89 LEFT ARM SWELLING: ICD-10-CM

## 2018-07-20 DIAGNOSIS — M79.89 LEG SWELLING: Primary | ICD-10-CM

## 2018-07-20 ASSESSMENT — ENCOUNTER SYMPTOMS
EYE ITCHING: 0
ABDOMINAL PAIN: 0
COUGH: 0
COLOR CHANGE: 0
EYE DISCHARGE: 0
SINUS PRESSURE: 0
CHEST TIGHTNESS: 0
SORE THROAT: 0
DIARRHEA: 0
ABDOMINAL DISTENTION: 0
APNEA: 0
WHEEZING: 0
STRIDOR: 0
VOICE CHANGE: 0
SHORTNESS OF BREATH: 0
RHINORRHEA: 0
VOMITING: 0
BLOOD IN STOOL: 0
CONSTIPATION: 0
TROUBLE SWALLOWING: 0
SINUS PAIN: 0
BACK PAIN: 1

## 2018-07-20 NOTE — TELEPHONE ENCOUNTER
----- Message from Tyrell Hernandez MD sent at 7/19/2018  4:11 PM CDT -----  D-dimer markedly elevated. Did she get her vascular studies done today.  Also needs a V/Q scan to rule out PE

## 2018-07-20 NOTE — PROGRESS NOTES
fingers are turning blue at times. Her hand is also swollen and tender to touch.     Past Medical History:   Diagnosis Date    Abdominal pain     Abnormal EKG     Acute sinusitis     Allergic reaction to spider bite     Anemia     Anticoagulated     Anxiety     Arrhythmia     Ataxic gait     Lyons's esophagus     Bowel obstruction     Callus     Cardiac pacemaker     CKD (chronic kidney disease) stage 2, GFR 60-89 ml/min     Coat's syndrome     Coat's syndrome     both eyes    Depression     Diabetes mellitus type 2 in nonobese (HCC)     Diabetic nephropathy (HCC)     Disequilibrium     Dizziness     DVT (deep venous thrombosis) (HCC)     Exudative retinopathy     Fibromyalgia     Fibromyositis     Gastric ulcer     GERD (gastroesophageal reflux disease)     History of gastric bypass     Hx of lupus anticoagulant disorder     Hypertension     Hypothyroidism     Intermittent claudication (HCC)     Intestinal obstruction     Iron deficiency     Left-sided weakness     Low vitamin D level     Lupus     Menopause     Obesity     Osteoarthritis     Osteoporosis     Pernicious anemia     PUPP (pruritic urticarial papules and plaques of pregnancy)     Right leg numbness     Right sided sciatica     Sarcoidosis (HCC)     with liver involvement    Sciatica     Secondary hyperparathyroidism (HCC)     Shingles     Shortness of breath     Sleep apnea     Stomach ulcer     Syncope     Visual loss, one eye       Past Surgical History:   Procedure Laterality Date    APPENDECTOMY      CARDIAC CATHETERIZATION  10/21/13  [facility]    EF over 60%    CHOLECYSTECTOMY      COLONOSCOPY  2/2010    negative    COLONOSCOPY  2/22/10    Dr Fouzia Palmer    COLONOSCOPY  4/1/16    Dr LAKHWINDER Horvath-internal hemorrhoids, 5 yr recall    EYE SURGERY      Cyst on Right eye    EYE SURGERY      GASTRIC BYPASS SURGERY      GASTRIC BYPASS SURGERY      HERNIA REPAIR      HYSTERECTOMY      Complete    HYSTERECTOMY      Partial - because had a tubal pregnancy.      INCONTINENCE SURGERY      Bladder Sling    OTHER SURGICAL HISTORY      IVC filter    PACEMAKER INSERTION      PACEMAKER PLACEMENT      medtronic    RI TOTAL KNEE ARTHROPLASTY Right 3/26/2018    TOTAL KNEE REPLACEMENT COMPLEX PRIMARY performed by Esther Billings MD at [address]      SPLENECTOMY      KRISTEL AND BSO      TONSILLECTOMY AND ADENOIDECTOMY      UPPER GASTROINTESTINAL ENDOSCOPY  12/2011    gerd s/p gastric bypass    UPPER GASTROINTESTINAL ENDOSCOPY  2/2014    normal s/p gastric bypass    UPPER GASTROINTESTINAL ENDOSCOPY  2/2010    biopsy neg Barretts, chronic reflux esophagitis s/p gastric bypass    UPPER GASTROINTESTINAL ENDOSCOPY  7/2006    unremarkable s/p gastric bypass    UPPER GASTROINTESTINAL ENDOSCOPY  8/10/15    Dr Beth Osullivan UPPER GASTROINTESTINAL ENDOSCOPY  4/1/16    Dr Leah Jackman    UPPER GASTROINTESTINAL ENDOSCOPY N/A 4/1/2016    EGD ESOPHAGOGASTRODUODENOSCOPY performed by Sandrita Leal MD at [address] [facility]    [address] [facility] 2003      Social History     Social History    Marital status:      Spouse name: N/A    Number of children: N/A    Years of education: N/A     Social History Main Topics    Smoking status: Never Smoker    Smokeless tobacco: Never Used    Alcohol use No    Drug use: No    Sexual activity: Not Currently     Other Topics Concern    Not on file     Social History Narrative    No narrative on file      Family History   Problem Relation Age of Onset    Uterine Cancer Mother     Cervical Cancer Mother     Coronary Art Dis Mother     Heart Disease Father     Lung Cancer Father     Other Father         renal failure    Colon Cancer Brother     Colon Polyps Brother     Uterine Cancer Maternal Grandmother     Cervical Cancer Maternal Grandmother     Diabetes Maternal Grandmother     Cervical Cancer Sister    [place] Kirsten hours.      aspirin 81 MG EC tablet Take 81 mg by mouth daily.  Multiple Vitamin (MULTIVITAMIN PO) Take 1 tablet by mouth daily       tiZANidine (ZANAFLEX) 4 MG tablet Take 1 tablet by mouth 3 times daily 30 tablet 1    lisinopril (PRINIVIL;ZESTRIL) 5 MG tablet Take 4 tablets by mouth daily 30 tablet 3     No current facility-administered medications for this visit. Patient Active Problem List   Diagnosis    Vomiting    Burping    GERD (gastroesophageal reflux disease)    History of gastric bypass    Family history of colon cancer    Bloating    Enuresis    Nausea and vomiting    Colon cancer screening    Stable angina ([facility] [address]    Encounter for current long-term use of anticoagulants    Primary osteoarthritis of right knee    Arthritis of knee    Essential hypertension    Hyperglycemia    MER (obstructive sleep apnea)    Slow transit constipation    Iron deficiency anemia    Restrictive airway disease        Review of Systems   Constitutional: Negative for activity change, appetite change, chills, diaphoresis, fatigue, fever and unexpected weight change. HENT: Negative for congestion, ear pain, hearing loss, nosebleeds, postnasal drip, rhinorrhea, sinus pain, sinus pressure, sneezing, sore throat, tinnitus, trouble swallowing and voice change. Eyes: Negative for discharge and itching. Respiratory: Negative for apnea, cough, chest tightness, shortness of breath, wheezing and stridor. Cardiovascular: Positive for leg swelling. Negative for chest pain and palpitations. Left leg and left arm swelling. She complains of tenderness and swelling to the left lower extremity. She also complains of swelling to the left arm. She has pain pain. She states the fingers of her left hand  are turning blue. Gastrointestinal: Negative for abdominal distention, abdominal pain, blood in stool, constipation, diarrhea and vomiting.    Endocrine: Negative for cold intolerance, heat intolerance, polydipsia, polyphagia and polyuria. Genitourinary: Negative for difficulty urinating, dysuria, flank pain, frequency, hematuria and urgency. Musculoskeletal: Positive for back pain. Negative for arthralgias, gait problem, joint swelling, myalgias, neck pain and neck stiffness. She had bilateral knee pain, scheduled for left knee replacement soon   Skin: Negative for color change, pallor, rash and wound. Allergic/Immunologic: Negative for environmental allergies, food allergies and immunocompromised state. Neurological: Negative for dizziness, tremors, seizures, syncope, facial asymmetry, speech difficulty, weakness, light-headedness, numbness and headaches. Hematological: Negative for adenopathy. Does not bruise/bleed easily. Psychiatric/Behavioral: Negative for agitation, confusion, decreased concentration, dysphoric mood and hallucinations. The patient is not nervous/anxious and is not hyperactive. BP (!) 148/80   Pulse 62   Ht 5' 7\" (1.702 m)   Wt 216 lb (98 kg)   SpO2 99%   BMI 33.83 kg/m²   Physical Exam   Constitutional: Vital signs are normal. She appears well-developed and well-nourished. HENT:   Head: Normocephalic and atraumatic. Right Ear: External ear normal.   Left Ear: External ear normal.   Nose: Nose normal.   Mouth/Throat: Oropharynx is clear and moist. No oropharyngeal exudate. Eyes: Pupils are equal, round, and reactive to light. Right eye exhibits no discharge. Left eye exhibits no discharge. Neck: Normal range of motion. No tracheal deviation present. Cardiovascular: Normal rate, regular rhythm and normal heart sounds. Exam reveals no gallop and no friction rub. No murmur heard. Pulmonary/Chest: Effort normal and breath sounds normal. No respiratory distress. She has no wheezes. She has no rales. She exhibits no tenderness. Abdominal: Soft. Bowel sounds are normal. There is no tenderness. There is no rebound and no guarding. Status:   Future     Number of Occurrences:   1     Standing Expiration Date:   9/17/2018     Scheduling Instructions:      Bilateral lower extremity swelling    Ultrasound doppler venous arm left     Standing Status:   Future     Number of Occurrences:   1     Standing Expiration Date:   9/17/2018       Rianna Campoverde MD

## 2018-07-20 NOTE — TELEPHONE ENCOUNTER
Please call to check on patient. I want to know why she was sitting in vascular lab for 3 hours and why no test was done. She needs to go to the ER for evaluation or to Urgent care if she is still having issues.  It is important that she follow up

## 2018-07-23 ENCOUNTER — TELEPHONE (OUTPATIENT)
Dept: INTERNAL MEDICINE | Age: 68
End: 2018-07-23

## 2018-07-23 NOTE — TELEPHONE ENCOUNTER
Received a call from Cheryl Esteves in the cardio vascular dept that they could get the patient worked in today. Called her home number and left a message for her to call us back. Also left a message for her to call us back at her work number. Cheryl Esteves states they could get her in today at 1:00 or they can see what will work for the patient and call Cheryl Esteves back. The orders where in the chart and had been corrected at of 7/20/18 when we got an in basket message to get these completed. We didn't receive a phone call at the nurse station about this. Waiting on call back from patient now to see if she can make the 1:00 or when she could come.

## 2018-07-23 NOTE — TELEPHONE ENCOUNTER
----- Message from Baldemar Maxwell MD sent at 7/19/2018  4:12 PM CDT -----  INR 1.2. Has been off of her coumadin. 15 mg today and tomorrow. Then back to regular dosing.  Check again on Monday

## 2018-07-24 ENCOUNTER — HOSPITAL ENCOUNTER (OUTPATIENT)
Dept: VASCULAR LAB | Age: 68
Discharge: HOME | DRG: 300 | End: 2018-07-24
Payer: MEDICARE

## 2018-07-24 ENCOUNTER — TELEPHONE (OUTPATIENT)
Dept: INTERNAL MEDICINE | Age: 68
End: 2018-07-24

## 2018-07-24 ENCOUNTER — HOSPITAL ENCOUNTER (INPATIENT)
Age: 68
LOS: 9 days | Discharge: HOME | DRG: 300 | End: 2018-08-02
Attending: INTERNAL MEDICINE | Admitting: INTERNAL MEDICINE
Payer: MEDICARE

## 2018-07-24 ENCOUNTER — OFFICE VISIT (OUTPATIENT)
Dept: INTERNAL MEDICINE | Age: 68
End: 2018-07-24
Payer: MEDICARE

## 2018-07-24 VITALS
DIASTOLIC BLOOD PRESSURE: 88 MMHG | OXYGEN SATURATION: 99 % | HEART RATE: 67 BPM | HEIGHT: 67 IN | TEMPERATURE: 97.1 F | BODY MASS INDEX: 37.04 KG/M2 | SYSTOLIC BLOOD PRESSURE: 152 MMHG | WEIGHT: 236 LBS

## 2018-07-24 DIAGNOSIS — M79.89 LEFT ARM SWELLING: ICD-10-CM

## 2018-07-24 DIAGNOSIS — Z79.01 ANTICOAGULATED ON COUMADIN: ICD-10-CM

## 2018-07-24 DIAGNOSIS — D68.62 LUPUS ANTICOAGULANT DISORDER (HCC): ICD-10-CM

## 2018-07-24 DIAGNOSIS — I82.4Y2 ACUTE DEEP VEIN THROMBOSIS (DVT) OF PROXIMAL VEIN OF LEFT LOWER EXTREMITY (HCC): Primary | ICD-10-CM

## 2018-07-24 DIAGNOSIS — M79.89 LEG SWELLING: ICD-10-CM

## 2018-07-24 DIAGNOSIS — M79.89 SWELLING OF LEFT LOWER EXTREMITY: Primary | ICD-10-CM

## 2018-07-24 PROBLEM — I82.4Y9 DVT, LOWER EXTREMITY, PROXIMAL, ACUTE, UNSPECIFIED LATERALITY (HCC): Status: ACTIVE | Noted: 2018-07-24

## 2018-07-24 LAB
ALBUMIN SERPL-MCNC: 3.8 G/DL (ref 3.5–5.2)
ALP BLD-CCNC: 93 U/L (ref 35–104)
ALT SERPL-CCNC: 6 U/L (ref 5–33)
ANION GAP SERPL CALCULATED.3IONS-SCNC: 12 MMOL/L (ref 7–19)
AST SERPL-CCNC: 20 U/L (ref 5–32)
BILIRUB SERPL-MCNC: 0.5 MG/DL (ref 0.2–1.2)
BUN BLDV-MCNC: 12 MG/DL (ref 8–23)
CALCIUM SERPL-MCNC: 9.2 MG/DL (ref 8.8–10.2)
CHLORIDE BLD-SCNC: 103 MMOL/L (ref 98–111)
CO2: 23 MMOL/L (ref 22–29)
CREAT SERPL-MCNC: 0.8 MG/DL (ref 0.5–0.9)
GFR NON-AFRICAN AMERICAN: >60
GLUCOSE BLD-MCNC: 182 MG/DL (ref 70–99)
GLUCOSE BLD-MCNC: 88 MG/DL (ref 74–109)
HCT VFR BLD CALC: 30.6 % (ref 37–47)
HEMOGLOBIN: 9.6 G/DL (ref 12–16)
INR BLD: 1.27 (ref 0.88–1.18)
MCH RBC QN AUTO: 27.1 PG (ref 27–31)
MCHC RBC AUTO-ENTMCNC: 31.4 G/DL (ref 33–37)
MCV RBC AUTO: 86.4 FL (ref 81–99)
PDW BLD-RTO: 15.6 % (ref 11.5–14.5)
PERFORMED ON: ABNORMAL
PLATELET # BLD: 200 K/UL (ref 130–400)
PMV BLD AUTO: 12.1 FL (ref 9.4–12.3)
POTASSIUM SERPL-SCNC: 4.5 MMOL/L (ref 3.5–5)
PROTHROMBIN TIME: 15.8 SEC (ref 12–14.6)
RBC # BLD: 3.54 M/UL (ref 4.2–5.4)
SODIUM BLD-SCNC: 138 MMOL/L (ref 136–145)
TOTAL PROTEIN: 8 G/DL (ref 6.6–8.7)
WBC # BLD: 7 K/UL (ref 4.8–10.8)

## 2018-07-24 PROCEDURE — 1123F ACP DISCUSS/DSCN MKR DOCD: CPT | Performed by: NURSE PRACTITIONER

## 2018-07-24 PROCEDURE — 36415 COLL VENOUS BLD VENIPUNCTURE: CPT

## 2018-07-24 PROCEDURE — 3017F COLORECTAL CA SCREEN DOC REV: CPT | Performed by: NURSE PRACTITIONER

## 2018-07-24 PROCEDURE — G8400 PT W/DXA NO RESULTS DOC: HCPCS | Performed by: NURSE PRACTITIONER

## 2018-07-24 PROCEDURE — 6360000002 HC RX W HCPCS: Performed by: NURSE PRACTITIONER

## 2018-07-24 PROCEDURE — 93970 EXTREMITY STUDY: CPT

## 2018-07-24 PROCEDURE — 99214 OFFICE O/P EST MOD 30 MIN: CPT | Performed by: NURSE PRACTITIONER

## 2018-07-24 PROCEDURE — 80053 COMPREHEN METABOLIC PANEL: CPT

## 2018-07-24 PROCEDURE — 99285 EMERGENCY DEPT VISIT HI MDM: CPT | Performed by: NURSE PRACTITIONER

## 2018-07-24 PROCEDURE — 93971 EXTREMITY STUDY: CPT

## 2018-07-24 PROCEDURE — G8427 DOCREV CUR MEDS BY ELIG CLIN: HCPCS | Performed by: NURSE PRACTITIONER

## 2018-07-24 PROCEDURE — 96375 TX/PRO/DX INJ NEW DRUG ADDON: CPT

## 2018-07-24 PROCEDURE — 99284 EMERGENCY DEPT VISIT MOD MDM: CPT

## 2018-07-24 PROCEDURE — 1090F PRES/ABSN URINE INCON ASSESS: CPT | Performed by: NURSE PRACTITIONER

## 2018-07-24 PROCEDURE — 96374 THER/PROPH/DIAG INJ IV PUSH: CPT

## 2018-07-24 PROCEDURE — 1101F PT FALLS ASSESS-DOCD LE1/YR: CPT | Performed by: NURSE PRACTITIONER

## 2018-07-24 PROCEDURE — 85610 PROTHROMBIN TIME: CPT

## 2018-07-24 PROCEDURE — 96376 TX/PRO/DX INJ SAME DRUG ADON: CPT

## 2018-07-24 PROCEDURE — 1210000000 HC MED SURG R&B

## 2018-07-24 PROCEDURE — 85027 COMPLETE CBC AUTOMATED: CPT

## 2018-07-24 PROCEDURE — G8417 CALC BMI ABV UP PARAM F/U: HCPCS | Performed by: NURSE PRACTITIONER

## 2018-07-24 PROCEDURE — 1036F TOBACCO NON-USER: CPT | Performed by: NURSE PRACTITIONER

## 2018-07-24 PROCEDURE — 4040F PNEUMOC VAC/ADMIN/RCVD: CPT | Performed by: NURSE PRACTITIONER

## 2018-07-24 PROCEDURE — G8598 ASA/ANTIPLAT THER USED: HCPCS | Performed by: NURSE PRACTITIONER

## 2018-07-24 PROCEDURE — 99222 1ST HOSP IP/OBS MODERATE 55: CPT | Performed by: INTERNAL MEDICINE

## 2018-07-24 RX ORDER — ONDANSETRON 2 MG/ML
4 INJECTION INTRAMUSCULAR; INTRAVENOUS ONCE
Status: COMPLETED | OUTPATIENT
Start: 2018-07-24 | End: 2018-07-24

## 2018-07-24 RX ORDER — HYDROMORPHONE HCL IN 0.9% NACL 0.5 MG/ML
1 SYRINGE (ML) INTRAVENOUS ONCE
Status: COMPLETED | OUTPATIENT
Start: 2018-07-24 | End: 2018-07-24

## 2018-07-24 RX ADMIN — ONDANSETRON HYDROCHLORIDE 4 MG: 2 INJECTION, SOLUTION INTRAMUSCULAR; INTRAVENOUS at 21:05

## 2018-07-24 RX ADMIN — ONDANSETRON HYDROCHLORIDE 4 MG: 2 INJECTION, SOLUTION INTRAMUSCULAR; INTRAVENOUS at 22:35

## 2018-07-24 RX ADMIN — Medication 1 MG: at 21:05

## 2018-07-24 ASSESSMENT — ENCOUNTER SYMPTOMS
COUGH: 0
VOMITING: 0
VOMITING: 0
ABDOMINAL PAIN: 0
SHORTNESS OF BREATH: 0
PHOTOPHOBIA: 0
EYE REDNESS: 0
COLOR CHANGE: 0
DIARRHEA: 0
RHINORRHEA: 0
SHORTNESS OF BREATH: 0
BACK PAIN: 0
TROUBLE SWALLOWING: 0
NAUSEA: 0
ABDOMINAL DISTENTION: 0
EYE DISCHARGE: 0
VOICE CHANGE: 0
COUGH: 0
NAUSEA: 0

## 2018-07-24 ASSESSMENT — PAIN SCALES - GENERAL: PAINLEVEL_OUTOF10: 8

## 2018-07-24 NOTE — LETTER
[facility] [address] [facility]MED  [address]  Phone: [phone]             August 2, 2018    Patient: Mortimer Nails   YOB: 1950   Date of Visit: 7/24/2018       To Whom It May Concern: Shonna Burgess was seen and treated in our facility  beginning 7/24/2018 until 08/02/2018. She may return to work on Monday, August 6, 2018.       Sincerely,       Gold Torrez RN         Signature:__________________________________

## 2018-07-24 NOTE — PROGRESS NOTES
Allergies   Allergen Reactions    Insect Extract Allergy Skin Test Anaphylaxis     Bee stings    Prednisone      Headache and upset stomach    Zanaflex [Tizanidine Hcl]      Passed out lost control of body functions    Lortab [Hydrocodone-Acetaminophen] Nausea And Vomiting     Sweats, weak, nausea and vomiting    Other Nausea And Vomiting     Opiates------sweating, weak        Oxycodone-Acetaminophen Nausea And Vomiting     Sweating and vomiting     Ultram [Tramadol Hcl] Nausea And Vomiting     Sweating, weak, nausea and vomiting         Health Maintenance   Topic Date Due    Diabetic foot exam  06/29/1959    Diabetic retinal exam  06/29/1959    Shingles Vaccine (1 of 2 - 2 Dose Series) 06/29/1999    Flu vaccine (1) 09/01/2018    Diabetic microalbuminuria test  01/31/2019    Pneumococcal low/med risk (2 of 2 - PPSV23) 01/31/2019    A1C test (Diabetic or Prediabetic)  04/25/2019    Lipid screen  04/25/2019    Potassium monitoring  07/19/2019    Creatinine monitoring  07/19/2019    Breast cancer screen  09/07/2019    Colon cancer screen colonoscopy  04/01/2021    DTaP/Tdap/Td vaccine (2 - Td) 03/07/2027    DEXA (modify frequency per FRAX score)  Addressed    Hepatitis C screen  Completed       Subjective:      Review of Systems   Constitutional: Negative for chills and fever. HENT: Negative for ear pain, hearing loss, rhinorrhea and voice change. Eyes: Negative for photophobia and visual disturbance. Respiratory: Negative for cough and shortness of breath. Cardiovascular: Positive for leg swelling. Negative for chest pain and palpitations. Gastrointestinal: Negative for nausea and vomiting. Endocrine: Negative. Negative for cold intolerance and heat intolerance. Genitourinary: Negative for difficulty urinating and flank pain. Musculoskeletal: Negative for back pain and neck pain. Skin: Negative for color change and rash.    Allergic/Immunologic: Negative for environmental allergies and food allergies. Neurological: Negative for dizziness, speech difficulty and headaches. Hematological: Does not bruise/bleed easily. Psychiatric/Behavioral: Negative for sleep disturbance and suicidal ideas. Objective:     Physical Exam   Constitutional: She is oriented to person, place, and time. She appears well-developed and well-nourished. HENT:   Head: Atraumatic. Right Ear: External ear normal.   Left Ear: External ear normal.   Nose: Nose normal.   Mouth/Throat: Oropharynx is clear and moist.   Eyes: Conjunctivae are normal. Pupils are equal, round, and reactive to light. Neck: Normal range of motion. Neck supple. Cardiovascular: Normal rate, regular rhythm, S1 normal, S2 normal and normal heart sounds. Pulmonary/Chest: Effort normal and breath sounds normal.   Abdominal: Soft. Bowel sounds are normal.   Musculoskeletal: Normal range of motion. Legs:  Neurological: She is alert and oriented to person, place, and time. Skin: Skin is warm and dry. Psychiatric: She has a normal mood and affect. Her behavior is normal.   Nursing note and vitals reviewed. BP (!) 152/88 (Site: Left Arm, Position: Sitting)   Pulse 67   Temp 97.1 °F (36.2 °C)   Ht 5' 7\" (1.702 m)   Wt 236 lb (107 kg)   SpO2 99%   BMI 36.96 kg/m²     Assessment:       Diagnosis Orders   1. Swelling of left lower extremity         Plan:       She has swelling from the knee down; her foot is cool to touch, unable to palpate pedal pulses. Area to left of knee is warm and swollen. Vascular called and they will see patient right away if she goes immediately. Patient has family member with her that will assist. Will call with results. She knows to go to ED for worsened symptoms, chest pain or SOB. Patient given educational materials - see patient instructions. Discussed use, benefit, and side effects of prescribed medications. All patient questions answered. Pt voiced understanding.  Reviewed

## 2018-07-24 NOTE — TELEPHONE ENCOUNTER
Received call from vascular lab, around 6 or 6:30 pm. Patient has large DVT of the LLE. From my understanding, DVT from groin to mid calf. Advised to go to the ER. Patient noncompliant with coumadin therapy. Doubt she would pay for lovenox. Did have some SOB and elevated D-Dimer. I wanted a V/Q scan last week, but patient did not return calls to get this done. Did not go to ER on Friday night after she was advised to do so.  Needs evaluation, anticoagulation and possible social work evaluation to be sure that she has the resources to get the care that she needs

## 2018-07-25 ENCOUNTER — HOSPITAL ENCOUNTER (OUTPATIENT)
Dept: VASCULAR LAB | Age: 68
Discharge: HOME | End: 2018-07-25
Payer: MEDICARE

## 2018-07-25 LAB
APTT: 135.6 SEC (ref 26–36.2)
APTT: 99.9 SEC (ref 26–36.2)
APTT: >200 SEC (ref 26–36.2)
GLUCOSE BLD-MCNC: 86 MG/DL (ref 70–99)
HCT VFR BLD CALC: 30.9 % (ref 37–47)
HEMOGLOBIN: 9.9 G/DL (ref 12–16)
INR BLD: 1.37 (ref 0.88–1.18)
MCH RBC QN AUTO: 27 PG (ref 27–31)
MCHC RBC AUTO-ENTMCNC: 32 G/DL (ref 33–37)
MCV RBC AUTO: 84.4 FL (ref 81–99)
PDW BLD-RTO: 15.4 % (ref 11.5–14.5)
PERFORMED ON: NORMAL
PLATELET # BLD: 221 K/UL (ref 130–400)
PMV BLD AUTO: 12.5 FL (ref 9.4–12.3)
PROTHROMBIN TIME: 16.8 SEC (ref 12–14.6)
RBC # BLD: 3.66 M/UL (ref 4.2–5.4)
WBC # BLD: 8.4 K/UL (ref 4.8–10.8)

## 2018-07-25 PROCEDURE — 6360000002 HC RX W HCPCS: Performed by: INTERNAL MEDICINE

## 2018-07-25 PROCEDURE — 2580000003 HC RX 258: Performed by: INTERNAL MEDICINE

## 2018-07-25 PROCEDURE — 36592 COLLECT BLOOD FROM PICC: CPT

## 2018-07-25 PROCEDURE — 85027 COMPLETE CBC AUTOMATED: CPT

## 2018-07-25 PROCEDURE — 99232 SBSQ HOSP IP/OBS MODERATE 35: CPT | Performed by: FAMILY MEDICINE

## 2018-07-25 PROCEDURE — 2500000003 HC RX 250 WO HCPCS: Performed by: FAMILY MEDICINE

## 2018-07-25 PROCEDURE — 2580000003 HC RX 258: Performed by: FAMILY MEDICINE

## 2018-07-25 PROCEDURE — 85730 THROMBOPLASTIN TIME PARTIAL: CPT

## 2018-07-25 PROCEDURE — 36569 INSJ PICC 5 YR+ W/O IMAGING: CPT

## 2018-07-25 PROCEDURE — C1751 CATH, INF, PER/CENT/MIDLINE: HCPCS

## 2018-07-25 PROCEDURE — 76937 US GUIDE VASCULAR ACCESS: CPT

## 2018-07-25 PROCEDURE — 05H933Z INSERTION OF INFUSION DEVICE INTO RIGHT BRACHIAL VEIN, PERCUTANEOUS APPROACH: ICD-10-PCS | Performed by: INTERNAL MEDICINE

## 2018-07-25 PROCEDURE — 6370000000 HC RX 637 (ALT 250 FOR IP): Performed by: INTERNAL MEDICINE

## 2018-07-25 PROCEDURE — 82948 REAGENT STRIP/BLOOD GLUCOSE: CPT

## 2018-07-25 PROCEDURE — 1210000000 HC MED SURG R&B

## 2018-07-25 PROCEDURE — 36415 COLL VENOUS BLD VENIPUNCTURE: CPT

## 2018-07-25 PROCEDURE — 85610 PROTHROMBIN TIME: CPT

## 2018-07-25 RX ORDER — ESCITALOPRAM OXALATE 10 MG/1
10 TABLET ORAL DAILY
Status: DISCONTINUED | OUTPATIENT
Start: 2018-07-25 | End: 2018-08-02 | Stop reason: HOSPADM

## 2018-07-25 RX ORDER — LISINOPRIL 20 MG/1
20 TABLET ORAL DAILY
Status: DISCONTINUED | OUTPATIENT
Start: 2018-07-25 | End: 2018-08-02 | Stop reason: HOSPADM

## 2018-07-25 RX ORDER — LIDOCAINE HYDROCHLORIDE 10 MG/ML
5 INJECTION, SOLUTION EPIDURAL; INFILTRATION; INTRACAUDAL; PERINEURAL ONCE
Status: CANCELLED | OUTPATIENT
Start: 2018-07-25 | End: 2018-07-25

## 2018-07-25 RX ORDER — SODIUM CHLORIDE 0.9 % (FLUSH) 0.9 %
10 SYRINGE (ML) INJECTION EVERY 12 HOURS SCHEDULED
Status: DISCONTINUED | OUTPATIENT
Start: 2018-07-25 | End: 2018-08-02 | Stop reason: HOSPADM

## 2018-07-25 RX ORDER — SODIUM CHLORIDE 0.9 % (FLUSH) 0.9 %
10 SYRINGE (ML) INJECTION PRN
Status: DISCONTINUED | OUTPATIENT
Start: 2018-07-25 | End: 2018-08-02 | Stop reason: HOSPADM

## 2018-07-25 RX ORDER — HEPARIN SODIUM 10000 [USP'U]/100ML
18 INJECTION, SOLUTION INTRAVENOUS CONTINUOUS
Status: DISCONTINUED | OUTPATIENT
Start: 2018-07-25 | End: 2018-08-02

## 2018-07-25 RX ORDER — LIDOCAINE HYDROCHLORIDE 10 MG/ML
5 INJECTION, SOLUTION EPIDURAL; INFILTRATION; INTRACAUDAL; PERINEURAL ONCE
Status: COMPLETED | OUTPATIENT
Start: 2018-07-25 | End: 2018-07-25

## 2018-07-25 RX ORDER — SODIUM CHLORIDE 0.9 % (FLUSH) 0.9 %
10 SYRINGE (ML) INJECTION PRN
Status: CANCELLED | OUTPATIENT
Start: 2018-07-25

## 2018-07-25 RX ORDER — SODIUM CHLORIDE 0.9 % (FLUSH) 0.9 %
10 SYRINGE (ML) INJECTION EVERY 12 HOURS SCHEDULED
Status: CANCELLED | OUTPATIENT
Start: 2018-07-25

## 2018-07-25 RX ORDER — LEVOTHYROXINE SODIUM 0.1 MG/1
100 TABLET ORAL DAILY
Status: DISCONTINUED | OUTPATIENT
Start: 2018-07-25 | End: 2018-08-02 | Stop reason: HOSPADM

## 2018-07-25 RX ORDER — PANTOPRAZOLE SODIUM 40 MG/1
40 TABLET, DELAYED RELEASE ORAL 2 TIMES DAILY
Status: DISCONTINUED | OUTPATIENT
Start: 2018-07-25 | End: 2018-08-02 | Stop reason: HOSPADM

## 2018-07-25 RX ORDER — ACETAMINOPHEN 325 MG/1
650 TABLET ORAL EVERY 8 HOURS PRN
Status: DISCONTINUED | OUTPATIENT
Start: 2018-07-25 | End: 2018-08-02 | Stop reason: HOSPADM

## 2018-07-25 RX ORDER — HEPARIN SODIUM 1000 [USP'U]/ML
40 INJECTION, SOLUTION INTRAVENOUS; SUBCUTANEOUS PRN
Status: DISCONTINUED | OUTPATIENT
Start: 2018-07-25 | End: 2018-08-02

## 2018-07-25 RX ORDER — ROPINIROLE 1 MG/1
1 TABLET, FILM COATED ORAL 3 TIMES DAILY
Status: DISCONTINUED | OUTPATIENT
Start: 2018-07-25 | End: 2018-08-02 | Stop reason: HOSPADM

## 2018-07-25 RX ORDER — ONDANSETRON 2 MG/ML
4 INJECTION INTRAMUSCULAR; INTRAVENOUS EVERY 6 HOURS PRN
Status: DISCONTINUED | OUTPATIENT
Start: 2018-07-25 | End: 2018-08-02 | Stop reason: HOSPADM

## 2018-07-25 RX ORDER — PROMETHAZINE HYDROCHLORIDE 25 MG/ML
6.25 INJECTION, SOLUTION INTRAMUSCULAR; INTRAVENOUS ONCE
Status: COMPLETED | OUTPATIENT
Start: 2018-07-25 | End: 2018-07-25

## 2018-07-25 RX ORDER — PROMETHAZINE HYDROCHLORIDE 25 MG/ML
6.25 INJECTION, SOLUTION INTRAMUSCULAR; INTRAVENOUS EVERY 6 HOURS PRN
Status: DISCONTINUED | OUTPATIENT
Start: 2018-07-25 | End: 2018-07-25

## 2018-07-25 RX ORDER — HYDRALAZINE HYDROCHLORIDE 20 MG/ML
10 INJECTION INTRAMUSCULAR; INTRAVENOUS EVERY 6 HOURS PRN
Status: DISCONTINUED | OUTPATIENT
Start: 2018-07-25 | End: 2018-08-02 | Stop reason: HOSPADM

## 2018-07-25 RX ORDER — HEPARIN SODIUM 1000 [USP'U]/ML
80 INJECTION, SOLUTION INTRAVENOUS; SUBCUTANEOUS ONCE
Status: COMPLETED | OUTPATIENT
Start: 2018-07-25 | End: 2018-07-25

## 2018-07-25 RX ORDER — WARFARIN SODIUM 5 MG/1
10 TABLET ORAL
Status: COMPLETED | OUTPATIENT
Start: 2018-07-25 | End: 2018-07-25

## 2018-07-25 RX ORDER — HEPARIN SODIUM 1000 [USP'U]/ML
80 INJECTION, SOLUTION INTRAVENOUS; SUBCUTANEOUS PRN
Status: DISCONTINUED | OUTPATIENT
Start: 2018-07-25 | End: 2018-08-02

## 2018-07-25 RX ADMIN — WARFARIN SODIUM 10 MG: 5 TABLET ORAL at 17:04

## 2018-07-25 RX ADMIN — HYDRALAZINE HYDROCHLORIDE 10 MG: 20 INJECTION INTRAMUSCULAR; INTRAVENOUS at 00:43

## 2018-07-25 RX ADMIN — PROMETHAZINE HYDROCHLORIDE 6.25 MG: 25 INJECTION INTRAMUSCULAR; INTRAVENOUS at 00:44

## 2018-07-25 RX ADMIN — LEVOTHYROXINE SODIUM 100 MCG: 100 TABLET ORAL at 09:10

## 2018-07-25 RX ADMIN — HYDRALAZINE HYDROCHLORIDE 10 MG: 20 INJECTION INTRAMUSCULAR; INTRAVENOUS at 10:00

## 2018-07-25 RX ADMIN — ROPINIROLE HYDROCHLORIDE 1 MG: 1 TABLET, FILM COATED ORAL at 09:08

## 2018-07-25 RX ADMIN — HEPARIN SODIUM AND DEXTROSE 11 UNITS/KG/HR: 10000; 5 INJECTION INTRAVENOUS at 11:00

## 2018-07-25 RX ADMIN — PANTOPRAZOLE SODIUM 40 MG: 40 TABLET, DELAYED RELEASE ORAL at 09:08

## 2018-07-25 RX ADMIN — Medication 10 ML: at 21:12

## 2018-07-25 RX ADMIN — ROPINIROLE HYDROCHLORIDE 1 MG: 1 TABLET, FILM COATED ORAL at 21:12

## 2018-07-25 RX ADMIN — PANTOPRAZOLE SODIUM 40 MG: 40 TABLET, DELAYED RELEASE ORAL at 21:12

## 2018-07-25 RX ADMIN — ROPINIROLE HYDROCHLORIDE 1 MG: 1 TABLET, FILM COATED ORAL at 14:19

## 2018-07-25 RX ADMIN — LIDOCAINE HYDROCHLORIDE 5 ML: 10 INJECTION, SOLUTION EPIDURAL; INFILTRATION; INTRACAUDAL at 11:21

## 2018-07-25 RX ADMIN — HEPARIN SODIUM AND DEXTROSE 18 UNITS/KG/HR: 10000; 5 INJECTION INTRAVENOUS at 00:59

## 2018-07-25 RX ADMIN — HEPARIN SODIUM AND DEXTROSE 12 UNITS/KG/HR: 10000; 5 INJECTION INTRAVENOUS at 17:02

## 2018-07-25 RX ADMIN — HEPARIN SODIUM AND DEXTROSE 14 UNITS/KG/HR: 10000; 5 INJECTION INTRAVENOUS at 11:44

## 2018-07-25 RX ADMIN — LISINOPRIL 20 MG: 20 TABLET ORAL at 09:08

## 2018-07-25 RX ADMIN — HEPARIN SODIUM 7980 UNITS: 1000 INJECTION, SOLUTION INTRAVENOUS; SUBCUTANEOUS at 01:01

## 2018-07-25 RX ADMIN — ESCITALOPRAM OXALATE 10 MG: 10 TABLET ORAL at 09:07

## 2018-07-25 ASSESSMENT — PAIN SCALES - GENERAL
PAINLEVEL_OUTOF10: 0

## 2018-07-25 ASSESSMENT — ENCOUNTER SYMPTOMS
HEARTBURN: 0
BLURRED VISION: 0
DOUBLE VISION: 0
NAUSEA: 0
PHOTOPHOBIA: 0
RESPIRATORY NEGATIVE: 1
ORTHOPNEA: 0

## 2018-07-25 NOTE — H&P
pulses are 2+ on the right side, and 2+ on the left side. Pulmonary/Chest: She has no decreased breath sounds. She has no wheezes. She has no rhonchi. She has no rales. Abdominal: Soft. She exhibits no distension. There is no tenderness. Musculoskeletal:        Right lower leg: She exhibits swelling and edema. Left lower leg: She exhibits swelling and edema. Neurological: She is alert and oriented to person, place, and time. GCS eye subscore is 4. GCS verbal subscore is 5. GCS motor subscore is 6. Skin: Skin is warm and dry. Psychiatric: She has a normal mood and affect. DATA:  EKG:  I have reviewed EKG with the following interpretation:  Imaging:    No orders to display              Labs Reviewed   CBC - Abnormal; Notable for the following:        Result Value    RBC 3.54 (*)     Hemoglobin 9.6 (*)     Hematocrit 30.6 (*)     MCHC 31.4 (*)     RDW 15.6 (*)     All other components within normal limits   PROTIME-INR - Abnormal; Notable for the following:     Protime 15.8 (*)     INR 1.27 (*)     All other components within normal limits   COMPREHENSIVE METABOLIC PANEL          IMPRESSION:    DVT recurrent    Patient Active Problem List   Diagnosis    Vomiting    Burping    GERD (gastroesophageal reflux disease)    History of gastric bypass    Family history of colon cancer    Bloating    Enuresis    Nausea and vomiting    Colon cancer screening    Stable angina ([facility] [address]    Encounter for current long-term use of anticoagulants    Primary osteoarthritis of right knee    Arthritis of knee    Essential hypertension    Hyperglycemia    MER (obstructive sleep apnea)    Slow transit constipation    Iron deficiency anemia    Restrictive airway disease    DVT, lower extremity, proximal, acute, unspecified laterality ([address]       PLAN:     1. Start heparin gtt per protocol  2. Vascular consult  3. Review home medications  4. Pain and nausea control  5.  Pharmacy to dose warfarin    [facility]

## 2018-07-25 NOTE — PROGRESS NOTES
[facility]      Patient: Remington Yu  YOB: 1950  Date of Service: 7/25/2018  MRN: [id number]   Acct: [de-identified]   Primary Care Physician: Zulay Chaparro MD  Advance Directive: Full Code  Admit Date: 7/24/2018       Hospital Day: 1    CHIEF COMPLAINT Pain left leg    HPI this admission:  The patient is a 70 y.o. female who presents to ed with swelling LLE, had pain for 1 week , found to have extensive dvt, she is on warfarin for lupus anticoagulant, and has ivc filter, case was d/w vascular and instructed ed provider to start heparin gtt. She denies cp, c/o nausea vomiting. Interim note:    7/25 Left leg is still hurting, no chest pain or shortness of breath. Review of Systems:   Constitutional / general:  Denies fever/chills / sweats  HEENT: Denies sore throat / hoarseness / vision changes  CV:  Denies chest pain / palpitations/ orthopnea    Respiratory:  Denies cough / shortness of breath / sputum / hemoptysis  GI: Denies nausea / vomiting / abdominal pain / diarrhea / constipation  :  Denies dysuria / hesitancy / urgency / hematuria    Neuro: Denies muscle weakness / dysphagia / headache / paresthesias  Musculoskeletal:  Chronic edema both legs, left more swollen and painful. Objective:   VITALS:  BP (!) 165/83   Pulse 76   Temp 97.3 °F (36.3 °C) (Temporal)   Resp 20   Ht 5' 7\" (1.702 m)   Wt 220 lb 1 oz (99.8 kg)   SpO2 100%   BMI 34.47 kg/m²   24HR INTAKE/OUTPUT:    Intake/Output Summary (Last 24 hours) at 07/25/18 1345  Last data filed at 07/25/18 0942   Gross per 24 hour   Intake            330.9 ml   Output             1400 ml   Net          -1069.1 ml     Constitutional: She is oriented to person, place, and time. She appears well-developed and well-nourished. Non-toxic appearance. She does not have a sickly appearance. She does not appear ill. HENT:   Head: Normocephalic and atraumatic. Eyes: Lids are normal. Pupils are equal, round, and reactive to light. 1.27*  1.37*       Patient Active Problem List    Diagnosis Date Noted    Stable angina [facility])      Priority: High    DVT, lower extremity, proximal, acute, unspecified laterality ([facility] [address] 07/24/2018    Restrictive airway disease 03/27/2018    Arthritis of knee 03/26/2018    Essential hypertension 03/26/2018    Hyperglycemia 03/26/2018    MER (obstructive sleep apnea) 03/26/2018    Slow transit constipation 03/26/2018    Iron deficiency anemia 03/26/2018    Primary osteoarthritis of right knee 03/22/2018    Encounter for current long-term use of anticoagulants 02/22/2018    Nausea and vomiting     Colon cancer screening     Enuresis 08/28/2014    Vomiting 05/02/2014    Burping 05/02/2014    GERD (gastroesophageal reflux disease) 05/02/2014    History of gastric bypass 05/02/2014    Family history of colon cancer 05/02/2014    Bloating 05/02/2014       Assessment/plan:   Principal Problem:    DVT, lower extremity, proximal, acute, unspecified laterality (HCC)  Active Problems:    Stable angina (HCC)    GERD (gastroesophageal reflux disease)    History of gastric bypass    Essential hypertension    Restrictive airway disease  Resolved Problems:    * No resolved hospital problems. *      Plan:     1. Continue care, see above and orders. 2. Heparin drip to Coumadin. ?TPA consult vascular forthcoming. Oncology consult tomorrow. 3. Prognosis fair  4. Disposition continue inpatient care.       Madeline Scott MD  Hospitalist Service  7/25/2018  1:45 PM

## 2018-07-25 NOTE — PROGRESS NOTES
4 Eyes Skin Assessment    Rudi Hall is being assessed upon: Admission    I agree that I, Davi Rose, along with Breanna Wynne (either 2 RN's or 1 LPN and 1 RN) have performed a thorough Head to Toe Skin Assessment on the patient. ALL assessment sites listed below have been assessed. Areas assessed by both nurses:     [x]   Head, Face, and Ears   [x]   Shoulders, Back, and Chest  [x]   Arms, Elbows, and Hands   [x]   Coccyx, Sacrum, and Ischium  [x]   Legs, Feet, and Heels    Does the Patient have Skin Breakdown?  No    Prudencio Prevention initiated: Yes  Wound Care Orders initiated: NA    [facility] nurse consulted for Pressure Injury (Stage 3,4, Unstageable, DTI, NWPT, and Complex wounds) and New or Established Ostomies: NA        Primary Nurse eSignature: Davi Rose RN on 7/25/2018 at 5:53 AM      Co-Signer eSignature: Electronically signed by Esther Hartman RN on 7/25/18 at 5:58 AM

## 2018-07-25 NOTE — ED NOTES
Attempted to start IV x 2; unsuccessful. Asking another RN to assist at this time.       Taty Werner RN  07/24/18 1919

## 2018-07-25 NOTE — PLAN OF CARE
Problem: Falls - Risk of:  Goal: Will remain free from falls  Will remain free from falls   Outcome: Ongoing    Goal: Absence of physical injury  Absence of physical injury   Outcome: Ongoing      Problem: ABCDS Injury Assessment  Goal: Absence of physical injury  Outcome: Ongoing      Problem: Infection:  Goal: Will remain free from infection  Will remain free from infection  Outcome: Ongoing      Problem: Safety:  Goal: Free from accidental physical injury  Free from accidental physical injury  Outcome: Ongoing    Goal: Free from intentional harm  Free from intentional harm  Outcome: Ongoing      Problem: Daily Care:  Goal: Daily care needs are met  Daily care needs are met  Outcome: Ongoing      Problem: Pain:  Goal: Patient's pain/discomfort is manageable  Patient's pain/discomfort is manageable  Outcome: Ongoing      Problem: Skin Integrity:  Goal: Skin integrity will stabilize  Skin integrity will stabilize  Outcome: Ongoing      Problem: Discharge Planning:  Goal: Patients continuum of care needs are met  Patients continuum of care needs are met  Outcome: Ongoing      Problem: Skin Integrity:  Goal: Will show no infection signs and symptoms  Will show no infection signs and symptoms  Outcome: Ongoing    Goal: Absence of new skin breakdown  Absence of new skin breakdown  Outcome: Ongoing

## 2018-07-25 NOTE — ED PROVIDER NOTES
[facility] EMERGENCY DEPT  eMERGENCY dEPARTMENT eNCOUnter      Pt Name: Du Javed  MRN: [id number]  [place] 1950  Date of evaluation: 7/24/2018  Provider: Estella Sheets, [address]       Chief Complaint   Patient presents with    Other     blood clot         HISTORY OF PRESENT ILLNESS   (Location/Symptom, Timing/Onset, Context/Setting, Quality, Duration, Modifying Factors, Severity)  Note limiting factors. Du Javed is a 70 y.o. female who presents to the emergency department with a blood clot to her left leg. She has had swelling and pain x 1 week. She takes coumadin and has a jonah filter. HAs lupus. Has been going on for a week and was supposed to have an US of her leg but she has been putting it off     The history is provided by the patient. Other   This is a new problem. The current episode started more than 1 week ago. The problem occurs constantly. The problem has not changed since onset. Pertinent negatives include no chest pain, no abdominal pain, no headaches and no shortness of breath. Associated symptoms comments: Leg pain and swelling left. Nursing Notes were reviewed. REVIEW OF SYSTEMS    (2-9 systems for level 4, 10 or more for level 5)     Review of Systems   Constitutional: Negative for activity change, appetite change and fever. HENT: Negative for congestion and trouble swallowing. Eyes: Negative for discharge and redness. Respiratory: Negative for cough and shortness of breath. Cardiovascular: Negative for chest pain. Gastrointestinal: Negative for abdominal distention, abdominal pain, diarrhea, nausea and vomiting. Genitourinary: Negative for difficulty urinating. Musculoskeletal: Positive for arthralgias, gait problem, joint swelling and myalgias. Skin: Negative for rash and wound. Neurological: Negative for dizziness, light-headedness and headaches. Psychiatric/Behavioral: Negative for behavioral problems and confusion.    All other GABAPENTIN, ONCE-DAILY, 300 MG TABS    Take 300 mg by mouth 2 times daily as needed (leg pain). Zach Méndez HYDROMORPHONE (DILAUDID) 2 MG TABLET    Take 2 mg by mouth every 3 hours as needed for Pain. Zach Méndez LEVOTHYROXINE (SYNTHROID) 100 MCG TABLET    Take by mouth    LISINOPRIL (PRINIVIL;ZESTRIL) 20 MG TABLET    Take 1 tablet by mouth daily    LISINOPRIL (PRINIVIL;ZESTRIL) 5 MG TABLET    Take 4 tablets by mouth daily    MULTIPLE VITAMIN (MULTIVITAMIN PO)    Take 1 tablet by mouth daily     MULTIPLE VITAMINS-MINERALS (THERAPEUTIC MULTIVITAMIN-MINERALS) TABLET    Take 1 tablet by mouth daily    ONDANSETRON (ZOFRAN) 4 MG TABLET    Take 1 tablet by mouth every 8 hours as needed for Nausea or Vomiting    PANTOPRAZOLE (PROTONIX) 40 MG TABLET    Take 1 tablet by mouth 2 times daily    POTASSIUM CHLORIDE (KLOR-CON M) 10 MEQ EXTENDED RELEASE TABLET    Take 1 tablet by mouth daily    RANITIDINE (ZANTAC) 150 MG TABLET        ROPINIROLE (REQUIP) 1 MG TABLET    Take 1 mg by mouth 3 times daily    SUCRALFATE (CARAFATE) 1 GM/10ML SUSPENSION    Take 10 mLs by mouth 4 times daily    TIZANIDINE (ZANAFLEX) 4 MG TABLET    Take 1 tablet by mouth 3 times daily    WARFARIN (COUMADIN) 7.5 MG TABLET    Take 7.5 mg by mouth daily       ALLERGIES     Insect extract allergy skin test; Prednisone; Zanaflex [tizanidine hcl]; Lortab [hydrocodone-acetaminophen];  Other; Oxycodone-acetaminophen; and Ultram [tramadol hcl]    FAMILY HISTORY       Family History   Problem Relation Age of Onset    Uterine Cancer Mother     Cervical Cancer Mother     Coronary Art Dis Mother     Heart Disease Father     Lung Cancer Father     Other Father         renal failure    Colon Cancer Brother     Colon Polyps Brother     Uterine Cancer Maternal Grandmother     Cervical Cancer Maternal Grandmother     Diabetes Maternal Grandmother     Cervical Cancer Sister     Other Sister         fibromyalgia    Diabetes Paternal Aunt     Esophageal Cancer Neg Hx     Liver

## 2018-07-25 NOTE — PROGRESS NOTES
Heparin,Lexapro, Requip        Date INR Warfarin Dose   07/24/18 1.27 15mg per order PCP  After DVT suspected  Had been off Warfarin approx 1 week   07/25/18 1.37 10mg                               Plan: Coumadin 10mg po X1 tonight. Daily PT/INR until stable within therapeutic range. Thank you for the consult.      Electronically signed by Lynn Ulrich [facility] on 7/25/2018 at 12:57 PM

## 2018-07-26 LAB
ANION GAP SERPL CALCULATED.3IONS-SCNC: 10 MMOL/L (ref 7–19)
APTT: 123.9 SEC (ref 26–36.2)
APTT: 124.9 SEC (ref 26–36.2)
APTT: 46.1 SEC (ref 26–36.2)
BASOPHILS ABSOLUTE: 0 K/UL (ref 0–0.2)
BASOPHILS RELATIVE PERCENT: 0.3 % (ref 0–1)
BUN BLDV-MCNC: 14 MG/DL (ref 8–23)
CALCIUM SERPL-MCNC: 8.4 MG/DL (ref 8.8–10.2)
CHLORIDE BLD-SCNC: 102 MMOL/L (ref 98–111)
CO2: 25 MMOL/L (ref 22–29)
CREAT SERPL-MCNC: 1.1 MG/DL (ref 0.5–0.9)
EOSINOPHILS ABSOLUTE: 0.1 K/UL (ref 0–0.6)
EOSINOPHILS RELATIVE PERCENT: 1.1 % (ref 0–5)
FERRITIN: 97.4 NG/ML (ref 13–150)
GFR NON-AFRICAN AMERICAN: 49
GLUCOSE BLD-MCNC: 90 MG/DL (ref 74–109)
HAPTOGLOBIN: <10 MG/DL (ref 30–200)
HCT VFR BLD CALC: 27.8 % (ref 37–47)
HCT VFR BLD CALC: 30.8 % (ref 37–47)
HEMOGLOBIN: 8.7 G/DL (ref 12–16)
INR BLD: 1.29 (ref 0.88–1.18)
IRON SATURATION: 13 % (ref 14–50)
IRON: 41 UG/DL (ref 37–145)
LACTATE DEHYDROGENASE: 400 U/L (ref 91–215)
LYMPHOCYTES ABSOLUTE: 2.1 K/UL (ref 1.1–4.5)
LYMPHOCYTES RELATIVE PERCENT: 28.4 % (ref 20–40)
MCH RBC QN AUTO: 26.6 PG (ref 27–31)
MCHC RBC AUTO-ENTMCNC: 31.3 G/DL (ref 33–37)
MCV RBC AUTO: 85 FL (ref 81–99)
MONOCYTES ABSOLUTE: 0.9 K/UL (ref 0–0.9)
MONOCYTES RELATIVE PERCENT: 11.6 % (ref 0–10)
NEUTROPHILS ABSOLUTE: 4.3 K/UL (ref 1.5–7.5)
NEUTROPHILS RELATIVE PERCENT: 58.1 % (ref 50–65)
PDW BLD-RTO: 15.3 % (ref 11.5–14.5)
PLATELET # BLD: 210 K/UL (ref 130–400)
PMV BLD AUTO: 12.3 FL (ref 9.4–12.3)
POTASSIUM REFLEX MAGNESIUM: 3.9 MMOL/L (ref 3.5–5)
PROTHROMBIN TIME: 16 SEC (ref 12–14.6)
RBC # BLD: 3.27 M/UL (ref 4.2–5.4)
RETICULOCYTE ABSOLUTE COUNT: 0.05 M/UL (ref 0.03–0.12)
RETICULOCYTE COUNT PCT: 1.46 % (ref 0.5–1.5)
SODIUM BLD-SCNC: 137 MMOL/L (ref 136–145)
TOTAL IRON BINDING CAPACITY: 316 UG/DL (ref 250–400)
WBC # BLD: 7.3 K/UL (ref 4.8–10.8)

## 2018-07-26 PROCEDURE — 99232 SBSQ HOSP IP/OBS MODERATE 35: CPT | Performed by: FAMILY MEDICINE

## 2018-07-26 PROCEDURE — 83615 LACTATE (LD) (LDH) ENZYME: CPT

## 2018-07-26 PROCEDURE — 6370000000 HC RX 637 (ALT 250 FOR IP): Performed by: INTERNAL MEDICINE

## 2018-07-26 PROCEDURE — 85635 REPTILASE TEST: CPT

## 2018-07-26 PROCEDURE — 85670 THROMBIN TIME PLASMA: CPT

## 2018-07-26 PROCEDURE — 83540 ASSAY OF IRON: CPT

## 2018-07-26 PROCEDURE — 36592 COLLECT BLOOD FROM PICC: CPT

## 2018-07-26 PROCEDURE — 2580000003 HC RX 258: Performed by: FAMILY MEDICINE

## 2018-07-26 PROCEDURE — 83550 IRON BINDING TEST: CPT

## 2018-07-26 PROCEDURE — 82728 ASSAY OF FERRITIN: CPT

## 2018-07-26 PROCEDURE — 85045 AUTOMATED RETICULOCYTE COUNT: CPT

## 2018-07-26 PROCEDURE — 99221 1ST HOSP IP/OBS SF/LOW 40: CPT | Performed by: SURGERY

## 2018-07-26 PROCEDURE — 1210000000 HC MED SURG R&B

## 2018-07-26 PROCEDURE — 83010 ASSAY OF HAPTOGLOBIN QUANT: CPT

## 2018-07-26 PROCEDURE — 80048 BASIC METABOLIC PNL TOTAL CA: CPT

## 2018-07-26 PROCEDURE — 85610 PROTHROMBIN TIME: CPT

## 2018-07-26 PROCEDURE — 6360000002 HC RX W HCPCS: Performed by: INTERNAL MEDICINE

## 2018-07-26 PROCEDURE — 85025 COMPLETE CBC W/AUTO DIFF WBC: CPT

## 2018-07-26 PROCEDURE — 99999 PR OFFICE/OUTPT VISIT,PROCEDURE ONLY: CPT | Performed by: SURGERY

## 2018-07-26 PROCEDURE — 85730 THROMBOPLASTIN TIME PARTIAL: CPT

## 2018-07-26 PROCEDURE — 6370000000 HC RX 637 (ALT 250 FOR IP): Performed by: FAMILY MEDICINE

## 2018-07-26 PROCEDURE — 85613 RUSSELL VIPER VENOM DILUTED: CPT

## 2018-07-26 RX ORDER — GABAPENTIN 300 MG/1
300 CAPSULE ORAL 2 TIMES DAILY
Status: DISCONTINUED | OUTPATIENT
Start: 2018-07-26 | End: 2018-08-02 | Stop reason: HOSPADM

## 2018-07-26 RX ORDER — WARFARIN SODIUM 5 MG/1
10 TABLET ORAL
Status: COMPLETED | OUTPATIENT
Start: 2018-07-26 | End: 2018-07-26

## 2018-07-26 RX ORDER — HYDROMORPHONE HYDROCHLORIDE 2 MG/1
2 TABLET ORAL
Status: DISCONTINUED | OUTPATIENT
Start: 2018-07-26 | End: 2018-08-02 | Stop reason: HOSPADM

## 2018-07-26 RX ADMIN — LEVOTHYROXINE SODIUM 100 MCG: 100 TABLET ORAL at 06:14

## 2018-07-26 RX ADMIN — GABAPENTIN 300 MG: 300 CAPSULE ORAL at 20:16

## 2018-07-26 RX ADMIN — HEPARIN SODIUM 3990 UNITS: 1000 INJECTION, SOLUTION INTRAVENOUS; SUBCUTANEOUS at 11:32

## 2018-07-26 RX ADMIN — GABAPENTIN 300 MG: 300 CAPSULE ORAL at 14:24

## 2018-07-26 RX ADMIN — PANTOPRAZOLE SODIUM 40 MG: 40 TABLET, DELAYED RELEASE ORAL at 20:16

## 2018-07-26 RX ADMIN — PANTOPRAZOLE SODIUM 40 MG: 40 TABLET, DELAYED RELEASE ORAL at 10:11

## 2018-07-26 RX ADMIN — Medication 10 ML: at 10:12

## 2018-07-26 RX ADMIN — ROPINIROLE HYDROCHLORIDE 1 MG: 1 TABLET, FILM COATED ORAL at 20:16

## 2018-07-26 RX ADMIN — HEPARIN SODIUM AND DEXTROSE 10.02 UNITS/KG/HR: 10000; 5 INJECTION INTRAVENOUS at 17:04

## 2018-07-26 RX ADMIN — ACETAMINOPHEN 650 MG: 325 TABLET, FILM COATED ORAL at 10:18

## 2018-07-26 RX ADMIN — ESCITALOPRAM OXALATE 10 MG: 10 TABLET ORAL at 10:11

## 2018-07-26 RX ADMIN — LISINOPRIL 20 MG: 20 TABLET ORAL at 10:11

## 2018-07-26 RX ADMIN — WARFARIN SODIUM 10 MG: 5 TABLET ORAL at 16:57

## 2018-07-26 RX ADMIN — ROPINIROLE HYDROCHLORIDE 1 MG: 1 TABLET, FILM COATED ORAL at 10:11

## 2018-07-26 RX ADMIN — ROPINIROLE HYDROCHLORIDE 1 MG: 1 TABLET, FILM COATED ORAL at 14:24

## 2018-07-26 RX ADMIN — HYDROMORPHONE HYDROCHLORIDE 2 MG: 2 TABLET ORAL at 14:26

## 2018-07-26 ASSESSMENT — ENCOUNTER SYMPTOMS
HEMOPTYSIS: 0
BACK PAIN: 0
EYE PAIN: 0
EYES NEGATIVE: 1
DIARRHEA: 0
DOUBLE VISION: 0
ABDOMINAL PAIN: 0
SPUTUM PRODUCTION: 0
VOMITING: 0
EYE DISCHARGE: 0
HEARTBURN: 0
COUGH: 0
GASTROINTESTINAL NEGATIVE: 1
CONSTIPATION: 0
BLURRED VISION: 0
SORE THROAT: 0
BLOOD IN STOOL: 0
RESPIRATORY NEGATIVE: 1
SHORTNESS OF BREATH: 0
WHEEZING: 0
NAUSEA: 0
EYE REDNESS: 0
ORTHOPNEA: 0

## 2018-07-26 ASSESSMENT — PAIN SCALES - GENERAL
PAINLEVEL_OUTOF10: 0
PAINLEVEL_OUTOF10: 8
PAINLEVEL_OUTOF10: 0
PAINLEVEL_OUTOF10: 0
PAINLEVEL_OUTOF10: 10
PAINLEVEL_OUTOF10: 2

## 2018-07-26 NOTE — CONSULTS
Consultation     Pt Name: Carlin Sicard  MRN: [id number]  YOB: 1950  Date of evaluation: 7/26/2018      REASON FOR CONSULTATION:  Recurrent DVT    REQUESTING PHYSICIAN:Geronimo Garza MD    ATTENDING/ADMITTING Hanny Vogel MD     History Obtained From:  patient, electronic medical record    HISTORY OF PRESENT ILLNESS:     Ms. Chino Hernandez is a pleasant 58-year-old -American female who is presently admitted for recurrent left lower extremity DVT. [facility] has been on chronic anticoagulation with warfarin and was subtherapeutic at presentation with an INR of 1.27. Review of INRs available through Epic dating back to 1/31/2018 to present date, [facility] has remained routinely subtherapeutic. She has an INR machine at home and reports that she monitors her INR routinely with last INR of 1.97. [facility] reports financial restraint and unable to obtain warfarin. She has a history of pulmonary emboli, DVT and systemic lupus erythematous and has been followed in the past by Dr. Tierra Garcia. [facility] is presently on a heparin drip. Hematology consultation has been requested for further anticoagulation recommendations. Hematology history: [facility] was followed by Dr. Tierra Garcia dating back to June of 2003. Records indicate A history of bilateral pulmonary emboli with recurrent subtherapeutic INR with warfarin. She also received IV iron replacement. Bone marrow aspiration and biopsy was completed on 8/31/2007 documented left shifted myeloid maturation and dysmaturation. 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 Normocellular bone marrow for age. Decreased storage iron and no ringed sideroblasts. Moderate increase of reticulin fibers.           Past Medical History:    Past Medical History:   Diagnosis Date    Abdominal pain     Abnormal EKG     Acute sinusitis     Allergic reaction to spider bite     Anemia     Anticoagulated     Anxiety     Arrhythmia     Ataxic gait     Lyons's mother. Review of Systems   Constitutional: Negative. Negative for chills, diaphoresis, fever, malaise/fatigue and weight loss. HENT: Negative. Negative for congestion, ear pain, hearing loss, nosebleeds, sore throat and tinnitus. Eyes: Negative. Negative for blurred vision, double vision, pain, discharge and redness. Respiratory: Negative. Negative for cough, hemoptysis, sputum production, shortness of breath and wheezing. Cardiovascular: Negative. Negative for chest pain, palpitations, orthopnea, claudication and leg swelling. Gastrointestinal: Negative. Negative for abdominal pain, blood in stool, constipation, diarrhea, heartburn, melena, nausea and vomiting. Genitourinary: Negative for dysuria, flank pain, frequency, hematuria and urgency. Musculoskeletal: Negative. Negative for back pain, falls, joint pain, myalgias and neck pain. Left lower extremity pain/edema   Skin: Negative. Negative for itching and rash. Neurological: Negative. Negative for dizziness, tingling, tremors, sensory change, speech change, focal weakness, seizures, loss of consciousness, weakness and headaches. Endo/Heme/Allergies: Negative. Negative for polydipsia. Does not bruise/bleed easily. Psychiatric/Behavioral: Negative. Negative for depression and memory loss. The patient is not nervous/anxious. Physical Exam   Constitutional: She is oriented to person, place, and time. She appears well-developed. No distress. HENT:   Head: Normocephalic and atraumatic. Mouth/Throat: Oropharynx is clear and moist.   Eyes: Conjunctivae and EOM are normal. Pupils are equal, round, and reactive to light. Right eye exhibits no discharge. Left eye exhibits no discharge. No scleral icterus. Neck: Normal range of motion. Neck supple. No JVD present. No tracheal deviation present. Cardiovascular: Normal rate, regular rhythm, normal heart sounds and intact distal pulses.   Exam reveals no gallop and no

## 2018-07-26 NOTE — PLAN OF CARE
Problem: Infection - Risk of, Central Venous Catheter-Associated Bloodstream Infection  Goal: Absence of central venous catheter-associated bloodstream infection  Outcome: Ongoing

## 2018-07-26 NOTE — CONSULTS
Provider Last Rate Last Dose    warfarin (COUMADIN) tablet 10 mg  10 mg Oral Once Adrián Rodriges MD        [facility] injection 4 mg  4 mg Intravenous Q6H PRN Iban Reynolds MD        sodium chloride flush 0.9 % injection 10 mL  10 mL Intravenous 2 times per day Iban Reynolds MD   10 mL at 07/25/18 2112    sodium chloride flush 0.9 % injection 10 mL  10 mL Intravenous PRN Iban Reynolds MD        acetaminophen (TYLENOL) tablet 650 mg  650 mg Oral Q8H PRN Judson Caal MD   650 mg at 07/26/18 1018    heparin (porcine) injection 7,980 Units  80 Units/kg Intravenous PRN Iban Reynolds MD        heparin (porcine) injection 3,990 Units  40 Units/kg Intravenous PRN Iban Reynolds MD   3,990 Units at 07/26/18 1132    heparin 25,000 units in dextrose 5% 250 mL infusion  18 Units/kg/hr Intravenous Continuous Iban Reynolds MD 10 mL/hr at 07/26/18 1132 10.018 Units/kg/hr at 07/26/18 1132    hydrALAZINE (APRESOLINE) injection 10 mg  10 mg Intravenous Q6H PRN Judson Caal MD   10 mg at 07/25/18 1000    escitalopram (LEXAPRO) tablet 10 mg  10 mg Oral Daily Judson Caal MD   10 mg at 07/26/18 1011    Gabapentin (Once-Daily) TABS 300 mg  300 mg Oral BID PRN Iban Reynolds MD        levothyroxine (SYNTHROID) tablet 100 mcg  100 mcg Oral Daily Judson Caal MD   100 mcg at 07/26/18 3863    lisinopril (PRINIVIL;ZESTRIL) tablet 20 mg  20 mg Oral Daily Judson Caal MD   20 mg at 07/26/18 1011    pantoprazole (PROTONIX) tablet 40 mg  40 mg Oral BID Jagdeep Caal MD   40 mg at 07/26/18 1011    rOPINIRole (REQUIP) tablet 1 mg  1 mg Oral TID Judson Caal MD   1 mg at 07/26/18 1011    warfarin (COUMADIN) daily dosing (placeholder)   Other RX Placeholder Iban Reynolds MD        sodium chloride flush 0.9 % injection 10 mL  10 mL Intravenous 2 times per day Adrián Rodriges MD   10 mL at 07/26/18 1012    sodium chloride flush 0.9 % injection 10 mL  10 mL Intravenous PRN SURGERY      GASTRIC BYPASS SURGERY      GASTRIC BYPASS SURGERY      HERNIA REPAIR      HYSTERECTOMY      Complete    HYSTERECTOMY      Partial - because had a tubal pregnancy.      INCONTINENCE SURGERY      Bladder Sling    OTHER SURGICAL HISTORY      IVC filter    PACEMAKER INSERTION      PACEMAKER PLACEMENT      medtronic    CO TOTAL KNEE ARTHROPLASTY Right 3/26/2018    TOTAL KNEE REPLACEMENT COMPLEX PRIMARY performed by Rajesh Contreras MD at [address] [place] SPLENECTOMY      KRISTEL AND BSO      TONSILLECTOMY AND ADENOIDECTOMY      UPPER GASTROINTESTINAL ENDOSCOPY  12/2011    gerd s/p gastric bypass    UPPER GASTROINTESTINAL ENDOSCOPY  2/2014    normal s/p gastric bypass    UPPER GASTROINTESTINAL ENDOSCOPY  2/2010    biopsy neg Barretts, chronic reflux esophagitis s/p gastric bypass    UPPER GASTROINTESTINAL ENDOSCOPY  7/2006    unremarkable s/p gastric bypass    UPPER GASTROINTESTINAL ENDOSCOPY  8/10/15    Dr Gisele Mcleod UPPER GASTROINTESTINAL ENDOSCOPY  4/1/16    Dr LAKHWINDER Ruiz [employer] N/A 4/1/2016    EGD ESOPHAGOGASTRODUODENOSCOPY performed by Caroline Horner MD at [address] [facility]    [facility] Right 2003     Family History   Problem Relation Age of Onset    Uterine Cancer Mother     Cervical Cancer Mother     Coronary Art Dis Mother     Heart Disease Father     Lung Cancer Father     Other Father         renal failure    Colon Cancer Brother     Colon Polyps Brother     Uterine Cancer Maternal Grandmother     Cervical Cancer Maternal Grandmother     Diabetes Maternal Grandmother     Cervical Cancer Sister     Other Sister         fibromyalgia    Diabetes Paternal Aunt     Esophageal Cancer Neg Hx     Liver Cancer Neg Hx     Liver Disease Neg Hx     Stomach Cancer Neg Hx     Rectal Cancer Neg Hx      Social History   Substance Use Topics    Smoking status: Never Smoker    Smokeless tobacco: Never Used    Alcohol use No       Old records have been obtained from the referring. These records have been reviewed and summarized. Review of Systems    Constitutional - no significant activity change, appetite change, or unexpected weight change. No fever or chills. No diaphoresis or significant fatigue. HENT - no significant rhinorrhea or epistaxis. No tinnitus or significant hearing loss. Eyes - no sudden vision change or amaurosis. Respiratory - no significant shortness of breath, wheezing, or stridor. No apnea, cough, or chest tightness associated with shortness of breath. Cardiovascular - no chest pain, syncope, or significant dizziness. No palpitations. No claudication. mild edema. Gastrointestinal - no abdominal swelling or pain. No blood in stool. No severe constipation, diarrhea, nausea, or vomiting. Genitourinary - No difficulty urinating, dysuria, frequency, or urgency. No flank pain or hematuria. Musculoskeletal - no back pain, gait disturbance, or myalgia. Skin - no color change, rash, pallor, or new wound. Neurologic - no dizziness, facial asymmetry, or light headedness. No seizures. No speech difficulty or lateralizing weakness. Hematologic - no easy bruising or excessive bleeding. Psychiatric - no severe anxiety or nervousness. No confusion. All other review of systems are negative. Physical Exam    BP (!) 146/99   Pulse 79   Temp 97.8 °F (36.6 °C)   Resp 18   Ht 5' 7\" (1.702 m)   Wt 219 lb 4 oz (99.5 kg)   SpO2 98%   BMI 34.34 kg/m²     Constitutional - well developed, well nourished. No diaphoresis or acute distress. HENT - head normocephalic. Right external ear canal appears normal.  Left external ear canal appears normal.  Septum appears midline. Eyes - conjunctiva normal.  EOMS normal.  No exudate. No icterus. Neck- ROM appears normal, no tracheal deviation. Cardiovascular - Regular rate and rhythm.   Heart sounds are normal.  No murmur, rub, or gallop. Carotid pulses are 2+ to palpation bilaterally without bruit. Extremities - Radial and brachial pulses are 2+ to palpation bilaterally. Right femoral pulse: present 2+; Right popliteal pulse: absent Right DP: present 1+; Right PT present 1+; Left femoral pulse: present 2+; Left popliteal pulse: absent; Left DP: present 1+; Left PT: present 1+  No cyanosis, clubbing, or significant edema. No signs atheroembolic event. Pulmonary - effort appears normal.  No respiratory distress. Lungs - Breath sounds normal. No wheezes or rales. GI - Abdomen - soft, non tender, bowel sounds X 4 quadrants. No guarding or rebound tenderness. No distension or palpable mass. Genitourinary - deferred. Musculoskeletal - ROM appears normal.  Mild bilateral  edema. Neurologic - alert and oriented X 3. Physiologic. Skin - warm, dry, and intact. No rash, erythema, or pallor. No tense areas or blistering. Psychiatric - mood, affect, and behavior appear normal.  Judgment and thought processes appear normal.  Impression       Sundar Lezama is evidence of chronic deep vein thrombosis in the right lower    extremity involving the femoral, popliteal, veins.    Evidence of reflux in the deep veins of the right lower extremity.   Sundar Lezama is evidence of acute deep vein thrombosis in the left lower    extremity involving the common femoral, femoral, popliteal, and posterior    tibial veins.    Superficial thrombophlebitis is seen in the great saphenous vein of the    left lower extremity(ies).        Signature        ----------------------------------------------------------------    Electronically signed by Tiki Pang MD(Interpreting    physician) on 07/25/2018 06:59 AM    ----------------------------------------------------------------         Assessment   This is not a failure of Coumadin as she was not therapeutic on her coumadin.   I would not use a DOAC in a lupus patient, there is a higher incidence of

## 2018-07-26 NOTE — PROGRESS NOTES
[facility]      Patient: Adi Buckley  YOB: 1950  Date of Service: 7/26/2018  MRN: [id number]   Acct: [de-identified]   Primary Care Physician: Aletha Panda MD  Advance Directive: Full Code  Admit Date: 7/24/2018       Hospital Day: 2    CHIEF COMPLAINT Pain left leg    HPI this admission:  The patient is a 70 y.o. female who presents to ed with swelling LLE, had pain for 1 week , found to have extensive dvt, she is on warfarin for lupus anticoagulant, and has ivc filter, case was d/w vascular and instructed ed provider to start heparin gtt. She denies cp, c/o nausea vomiting. Interim note:    7/25 Left leg is still hurting, no chest pain or shortness of breath.  7/26 Pain in left leg is unchanged. Review of Systems:   Constitutional / general:  Denies fever/chills / sweats  HEENT: Denies sore throat / hoarseness / vision changes  CV:  Denies chest pain / palpitations/ orthopnea    Respiratory:  Denies cough / shortness of breath / sputum / hemoptysis  GI: Denies nausea / vomiting / abdominal pain / diarrhea / constipation  :  Denies dysuria / hesitancy / urgency / hematuria    Neuro: Denies muscle weakness / dysphagia / headache / paresthesias  Musculoskeletal:  Chronic edema both legs, left more swollen and painful. Objective:   VITALS:  BP (!) 146/99   Pulse 79   Temp 97.8 °F (36.6 °C)   Resp 18   Ht 5' 7\" (1.702 m)   Wt 219 lb 4 oz (99.5 kg)   SpO2 98%   BMI 34.34 kg/m²   24HR INTAKE/OUTPUT:      Intake/Output Summary (Last 24 hours) at 07/26/18 1514  Last data filed at 07/26/18 1325   Gross per 24 hour   Intake             1329 ml   Output             2300 ml   Net             -971 ml     Constitutional: She is oriented to person, place, and time. She appears well-developed and well-nourished. Non-toxic appearance. She does not have a sickly appearance. She does not appear ill. HENT:   Head: Normocephalic and atraumatic.    Eyes: Lids are normal. Pupils are equal,

## 2018-07-26 NOTE — PROGRESS NOTES
Clinical Pharmacy Note    Warfarin consult follow-up    Recent Labs      07/26/18   0325   INR  1.29*     Recent Labs      07/24/18   2035  07/25/18   0848  07/26/18   0325  07/26/18   1022   HGB  9.6*  9.9*  8.7*   --    HCT  30.6*  30.9*  27.8*  30.8*   PLT  200  221  210   --        Significant Drug-Drug Interactions:  New warfarin drug-drug interactions: Synthroid  Discontinued drug-drug interactions: none    Date INR Warfarin Dose   07/24/18 1.27 15mg per order PCP  After DVT suspected  Had been off Warfarin approx 1 week   07/25/18 1.37 10mg     07/26/18 1.29  10 mg                                        Notes:   Give Coumadin 10 mg x 1 dose tonight    Daily PT/INR until stable within therapeutic range.      Electronically signed by Kaden Munoz [facility] on 7/26/2018 at 11:16 AM

## 2018-07-27 LAB
ANION GAP SERPL CALCULATED.3IONS-SCNC: 12 MMOL/L (ref 7–19)
APTT: 130.5 SEC (ref 26–36.2)
APTT: 60.8 SEC (ref 26–36.2)
APTT: 63.2 SEC (ref 26–36.2)
APTT: 81.3 SEC (ref 26–36.2)
BASOPHILS ABSOLUTE: 0 K/UL (ref 0–0.2)
BASOPHILS RELATIVE PERCENT: 0.3 % (ref 0–1)
BUN BLDV-MCNC: 17 MG/DL (ref 8–23)
CALCIUM SERPL-MCNC: 8.4 MG/DL (ref 8.8–10.2)
CHLORIDE BLD-SCNC: 102 MMOL/L (ref 98–111)
CO2: 22 MMOL/L (ref 22–29)
CREAT SERPL-MCNC: 1.2 MG/DL (ref 0.5–0.9)
EOSINOPHILS ABSOLUTE: 0.1 K/UL (ref 0–0.6)
EOSINOPHILS RELATIVE PERCENT: 1.5 % (ref 0–5)
GFR NON-AFRICAN AMERICAN: 45
GLUCOSE BLD-MCNC: 91 MG/DL (ref 74–109)
GLUCOSE BLD-MCNC: 92 MG/DL (ref 70–99)
HCT VFR BLD CALC: 28.2 % (ref 37–47)
HEMOGLOBIN: 9 G/DL (ref 12–16)
INR BLD: 1.31 (ref 0.88–1.18)
LYMPHOCYTES ABSOLUTE: 2.7 K/UL (ref 1.1–4.5)
LYMPHOCYTES RELATIVE PERCENT: 29.6 % (ref 20–40)
MCH RBC QN AUTO: 27.4 PG (ref 27–31)
MCHC RBC AUTO-ENTMCNC: 31.9 G/DL (ref 33–37)
MCV RBC AUTO: 85.7 FL (ref 81–99)
MONOCYTES ABSOLUTE: 1.3 K/UL (ref 0–0.9)
MONOCYTES RELATIVE PERCENT: 13.9 % (ref 0–10)
NEUTROPHILS ABSOLUTE: 4.9 K/UL (ref 1.5–7.5)
NEUTROPHILS RELATIVE PERCENT: 53.5 % (ref 50–65)
PDW BLD-RTO: 15.5 % (ref 11.5–14.5)
PERFORMED ON: NORMAL
PLATELET # BLD: 217 K/UL (ref 130–400)
PMV BLD AUTO: 12.7 FL (ref 9.4–12.3)
POTASSIUM SERPL-SCNC: 4.4 MMOL/L (ref 3.5–5)
PROTHROMBIN TIME: 16.2 SEC (ref 12–14.6)
RBC # BLD: 3.29 M/UL (ref 4.2–5.4)
SODIUM BLD-SCNC: 136 MMOL/L (ref 136–145)
WBC # BLD: 9.1 K/UL (ref 4.8–10.8)

## 2018-07-27 PROCEDURE — 85730 THROMBOPLASTIN TIME PARTIAL: CPT

## 2018-07-27 PROCEDURE — 1210000000 HC MED SURG R&B

## 2018-07-27 PROCEDURE — 80048 BASIC METABOLIC PNL TOTAL CA: CPT

## 2018-07-27 PROCEDURE — 85610 PROTHROMBIN TIME: CPT

## 2018-07-27 PROCEDURE — 2580000003 HC RX 258: Performed by: INTERNAL MEDICINE

## 2018-07-27 PROCEDURE — 99232 SBSQ HOSP IP/OBS MODERATE 35: CPT | Performed by: FAMILY MEDICINE

## 2018-07-27 PROCEDURE — 6370000000 HC RX 637 (ALT 250 FOR IP): Performed by: INTERNAL MEDICINE

## 2018-07-27 PROCEDURE — 6370000000 HC RX 637 (ALT 250 FOR IP): Performed by: FAMILY MEDICINE

## 2018-07-27 PROCEDURE — 36592 COLLECT BLOOD FROM PICC: CPT

## 2018-07-27 PROCEDURE — 2580000003 HC RX 258: Performed by: FAMILY MEDICINE

## 2018-07-27 PROCEDURE — 6360000002 HC RX W HCPCS: Performed by: NURSE PRACTITIONER

## 2018-07-27 PROCEDURE — 2580000003 HC RX 258: Performed by: NURSE PRACTITIONER

## 2018-07-27 PROCEDURE — 85025 COMPLETE CBC W/AUTO DIFF WBC: CPT

## 2018-07-27 PROCEDURE — 6360000002 HC RX W HCPCS: Performed by: INTERNAL MEDICINE

## 2018-07-27 PROCEDURE — 82948 REAGENT STRIP/BLOOD GLUCOSE: CPT

## 2018-07-27 RX ADMIN — GABAPENTIN 300 MG: 300 CAPSULE ORAL at 20:01

## 2018-07-27 RX ADMIN — WARFARIN SODIUM 12.5 MG: 2.5 TABLET ORAL at 17:42

## 2018-07-27 RX ADMIN — Medication 10 ML: at 08:29

## 2018-07-27 RX ADMIN — Medication 10 ML: at 20:02

## 2018-07-27 RX ADMIN — LEVOTHYROXINE SODIUM 100 MCG: 100 TABLET ORAL at 05:53

## 2018-07-27 RX ADMIN — HYDROMORPHONE HYDROCHLORIDE 2 MG: 2 TABLET ORAL at 20:07

## 2018-07-27 RX ADMIN — ROPINIROLE HYDROCHLORIDE 1 MG: 1 TABLET, FILM COATED ORAL at 08:29

## 2018-07-27 RX ADMIN — HEPARIN SODIUM AND DEXTROSE 8 UNITS/KG/HR: 10000; 5 INJECTION INTRAVENOUS at 03:07

## 2018-07-27 RX ADMIN — ROPINIROLE HYDROCHLORIDE 1 MG: 1 TABLET, FILM COATED ORAL at 20:02

## 2018-07-27 RX ADMIN — ROPINIROLE HYDROCHLORIDE 1 MG: 1 TABLET, FILM COATED ORAL at 14:21

## 2018-07-27 RX ADMIN — PANTOPRAZOLE SODIUM 40 MG: 40 TABLET, DELAYED RELEASE ORAL at 20:01

## 2018-07-27 RX ADMIN — ESCITALOPRAM OXALATE 10 MG: 10 TABLET ORAL at 08:28

## 2018-07-27 RX ADMIN — GABAPENTIN 300 MG: 300 CAPSULE ORAL at 08:29

## 2018-07-27 RX ADMIN — LISINOPRIL 20 MG: 20 TABLET ORAL at 08:28

## 2018-07-27 RX ADMIN — PANTOPRAZOLE SODIUM 40 MG: 40 TABLET, DELAYED RELEASE ORAL at 08:28

## 2018-07-27 RX ADMIN — IRON SUCROSE 400 MG: 20 INJECTION, SOLUTION INTRAVENOUS at 14:21

## 2018-07-27 ASSESSMENT — ENCOUNTER SYMPTOMS
NAUSEA: 0
VOMITING: 0
EYE PAIN: 0
WHEEZING: 0
DIARRHEA: 0
EYES NEGATIVE: 1
CONSTIPATION: 0
GASTROINTESTINAL NEGATIVE: 1
SORE THROAT: 0
EYE REDNESS: 0
BACK PAIN: 0
SPUTUM PRODUCTION: 0
DOUBLE VISION: 0
BLOOD IN STOOL: 0
ORTHOPNEA: 0
COUGH: 0
RESPIRATORY NEGATIVE: 1
SHORTNESS OF BREATH: 0
BLURRED VISION: 0
EYE DISCHARGE: 0
HEARTBURN: 0
ABDOMINAL PAIN: 0
HEMOPTYSIS: 0

## 2018-07-27 ASSESSMENT — PAIN SCALES - GENERAL
PAINLEVEL_OUTOF10: 1
PAINLEVEL_OUTOF10: 8
PAINLEVEL_OUTOF10: 0

## 2018-07-27 NOTE — PROGRESS NOTES
Clinical Pharmacy Note    Warfarin consult follow-up    Recent Labs      07/27/18   0200   INR  1.31*     Recent Labs      07/25/18   0848  07/26/18   0325  07/26/18   1022  07/27/18   0200   HGB  9.9*  8.7*   --   9.0*   HCT  30.9*  27.8*  30.8*  28.2*   PLT  221  210   --   217       Significant Drug-Drug Interactions:  New warfarin drug-drug interactions: None  Discontinued drug-drug interactions: None    Date INR Warfarin Dose   07/24/18 1.27 15mg per order PCP  After DVT suspected  Had been off Warfarin approx 1 week   07/25/18 1.37 10mg     07/26/18 1.29  10 mg     07/27/18 1.31  12.5 mg                               Notes:  Give Coumadin 12.5 mg x 1 dose tonight    Daily PT/INR until stable within therapeutic range.      Electronically signed by Nuria Jean, [address] [place] [address] on 7/27/2018 at 9:44 AM

## 2018-07-27 NOTE — PROGRESS NOTES
1. 1*  1.2*   GLUCOSE  90  91   CALCIUM  8.4*  8.4*     CBC: Recent Labs      07/26/18   0325  07/26/18   1022  07/27/18   0200   WBC  7.3   --   9.1   HGB  8.7*   --   9.0*   HCT  27.8*  30.8*  28.2*   MCV  85.0   --   85.7   PLT  210   --   217     CMP:  Recent Labs      07/24/18   2035  07/26/18   0325  07/27/18   0200   NA  138  137  136   K  4.5  3.9  4.4   CL  103  102  102   CO2  23  25  22   BUN  12  14  17   CREATININE  0.8  1.1*  1.2*   LABGLOM  >60  49*  45*   GLUCOSE  88  90  91   PROT  8.0   --    --    LABALBU  3.8   --    --    CALCIUM  9.2  8.4*  8.4*   BILITOT  0.5   --    --    ALKPHOS  93   --    --    AST  20   --    --    ALT  6   --    --        Radiology:   Xr Lumbar Spine (2-3 Views)    Result Date: 7/10/2018  XR LUMBAR SPINE (2-3 VIEWS) 7/10/2018 8:53 AM History: Back pain. Three-view lumbar spine series compared with 08/18/2017. Mildly exaggerated lordosis. No malalignment or compression fracture. Mild endplate spurring. Moderate degenerative facet sclerosis at the lower 3 lumbar levels. There is moderate endplate spurring and disc space narrowing within the lower thoracic spine. Multiple surgical clips and metal suture is seen throughout the abdomen. An IVC filter is present. There has been previous ventral hernia repair. 1. Mild degenerative lumbar spine changes with no acute bony abnormality. Signed by Dr Estuardo Xiao on 7/10/2018 8:58 AM    Ct Head Wo Contrast    Result Date: 7/13/2018  CT HEAD WO CONTRAST 7/13/2018 5:15 PM HISTORY: Syncope and fall COMPARISON: 7/20/2017 DLP: 560 mGy cm. In order to have a CT radiation dose as low as reasonably achievable, Automated Exposure Control was utilized for adjustment of the mA and/or KV according to patient size. TECHNIQUE: Helical tomographic images of the brain were obtained without the use of intravenous contrast. FINDINGS: The midline structures are nondisplaced.  The ventricles and basilar cisterns are normal in size and configuration. There is no evidence of intracranial hemorrhage or mass-effect. The gray-white matter differentiation is maintained. The sulci have a normal configuration, and there are no abnormal extra-axial fluid collections. The structures of the posterior fossa are unremarkable. The included orbits and their contents are unremarkable. The visualized paranasal sinuses, mastoid air cells and middle ear cavities are clear. The visualized osseous structures and overlying soft tissues of the skull and face are unremarkable. 1.  No acute intracranial process. Signed by Dr Nima Hdz on 7/13/2018 7:12 PM    Ct Facial Bones Wo Contrast    Result Date: 7/13/2018  CT FACIAL BONES WO CONTRAST 7/13/2018 5:15 PM HISTORY: Facial trauma COMPARISON: None DLP: 560 mGy cm. In order to have a CT radiation dose as low as reasonably achievable, Automated Exposure Control was utilized for adjustment of the mA and/or KV according to patient size. TECHNIQUE: Serial helical tomographic images of the facial bones were obtained without the use of intravenous contrast. Additionally, multiplanar reformats in the axial and coronal planes were also obtained. FINDINGS: Calcifications are seen in the involuted right globe. Soft tissue air is present in the left temporal and mandibular regions. This is likely due to acute trauma. No facial bone fractures are identified. The sinuses are clear. The orbital floors are intact. 1.  Soft tissue injury to the left aspect of the face without evidence of fracture. Signed by Dr Nima Hdz on 7/13/2018 7:14 PM    Ct Cervical Spine Wo Contrast    Result Date: 7/13/2018  CT CERVICAL SPINE WO CONTRAST 7/13/2018 5:15 PM HISTORY: Syncope and fall, neck pain COMPARISON: 7/20/2017 DLP: 559 mGy cm. In order to have a CT radiation dose as low as reasonably achievable, Automated Exposure Control was utilized for adjustment of the mA and/or KV according to patient size.  TECHNIQUE: Serial helical tomographic images of the cervical spine were obtained without the use of intravenous contrast. Additionally, sagittal and coronal reformatted images were also provided for review. FINDINGS: Multilevel degenerative changes are seen in the cervical spine. There is no evidence of acute fracture or subluxation. The prevertebral soft tissues are within normal limits. The posterior elements are intact. Vertebral body heights are maintained. The visualized skull base is intact. The visualized thorax demonstrates no acute abnormality. 1.  Degenerative changes in the cervical spine without evidence of acute osseous injury. Signed by Dr Jarvis Maldonado on 7/13/2018 7:16 PM    Xr Finger Left (min 2 Views)    Result Date: 7/19/2018  EXAMINATION: XR FINGER LEFT (MIN 2 VIEWS) 7/19/2018 1:23 PM HISTORY: Recent fall, persistent pain. Report: 3 views of the left fourth finger were obtained. There are no comparison studies. No fracture or dislocation is identified. The joint spaces are preserved. The soft tissues are within normal limits. No acute osseous abnormality. Signed by Dr Deon Mariohoose on 7/19/2018 1:24 PM    Xr Chest Portable    Result Date: 7/13/2018  XR CHEST PORTABLE 7/13/2018 5:15 PM HISTORY: Syncope COMPARISON: 3/9/2018. FINDINGS: Frontal view of the chest was obtained. The lungs are clear. The cardiomediastinal silhouette and pulmonary vascularity are unchanged. The osseous structures and surrounding soft tissues demonstrate no acute abnormality. 1. No radiographic evidence of acute cardiopulmonary process.  Signed by Dr Jarvis Maldonado on 7/13/2018 6:29 PM    Vl Dup Lower Extremity Venous Bilateral    Result Date: 7/25/2018  Vascular Lower Extremities DVT Study Procedure  Demographics   Patient Name      Krista Rhodes  Age                     70   Patient Number    [id number]      Gender                  Female   Visit Number      [id number]   Interpreting Physician  Haleigh Dang MD   Date of Birth     1950 Referring Physician     Haydee Leo   Accession Number  [id number]   [address] RVT  Procedure Type of Study:   Veins:Lower Extremities DVT Study, VL LOWER EXTREMITY BILATERAL VENOUS  DUPLEX . Indications for Study:Edema, bilateral lower extremity.   - Results were reported to:Dr. Carl Monroy @ 6:05pm.  Impression   There is evidence of chronic deep vein thrombosis in the right lower  extremity involving the femoral, popliteal, veins. Evidence of reflux in the deep veins of the right lower extremity. There is evidence of acute deep vein thrombosis in the left lower  extremity involving the common femoral, femoral, popliteal, and posterior  tibial veins. Superficial thrombophlebitis is seen in the great saphenous vein of the  left lower extremity(ies).    Signature   ----------------------------------------------------------------  Electronically signed by Mirella Emmanuel MD(Interpreting  physician) on 07/25/2018 06:59 AM  ----------------------------------------------------------------      Medications  Current Facility-Administered Medications   Medication Dose Route Frequency Provider Last Rate Last Dose    HYDROmorphone (DILAUDID) tablet 2 mg  2 mg Oral Q3H PRN Bradley Mcintyre MD   2 mg at 07/26/18 1426    gabapentin (NEURONTIN) capsule 300 mg  300 mg Oral BID Bradley Mcintyre MD   300 mg at 07/26/18 2016    ondansetron (ZOFRAN) injection 4 mg  4 mg Intravenous Q6H PRN Judson Caal MD        sodium chloride flush 0.9 % injection 10 mL  10 mL Intravenous 2 times per day Khadar Bennett MD   10 mL at 07/25/18 2112    sodium chloride flush 0.9 % injection 10 mL  10 mL Intravenous PRN Khadar Bennett MD        acetaminophen (TYLENOL) tablet 650 mg  650 mg Oral Q8H PRN Judson Caal MD   650 mg at 07/26/18 1018    heparin (porcine) injection 7,980 Units  80 Units/kg Intravenous PRN Judson Caal MD        heparin (porcine) injection 3,990 Units  40 Units/kg Intravenous PRN Mono Weebr MD   3,990 Units at 07/26/18 1132    heparin 25,000 units in dextrose 5% 250 mL infusion  18 Units/kg/hr Intravenous Continuous Brittneydestiny Caal MD 8 mL/hr at 07/27/18 0307 8 Units/kg/hr at 07/27/18 0307    hydrALAZINE (APRESOLINE) injection 10 mg  10 mg Intravenous Q6H PRN Judson Caal MD   10 mg at 07/25/18 1000    escitalopram (LEXAPRO) tablet 10 mg  10 mg Oral Daily Judson Caal MD   10 mg at 07/26/18 1011    levothyroxine (SYNTHROID) tablet 100 mcg  100 mcg Oral Daily Judson Caal MD   100 mcg at 07/27/18 0553    lisinopril (PRINIVIL;ZESTRIL) tablet 20 mg  20 mg Oral Daily Judson Caal MD   20 mg at 07/26/18 1011    pantoprazole (PROTONIX) tablet 40 mg  40 mg Oral BID Judson Caal MD   40 mg at 07/26/18 2016    rOPINIRole (REQUIP) tablet 1 mg  1 mg Oral TID Mono Weber MD   1 mg at 07/26/18 2016    warfarin (COUMADIN) daily dosing (placeholder)   Other RX Placeholder Mono Weber MD        sodium chloride flush 0.9 % injection 10 mL  10 mL Intravenous 2 times per day Sosa Santiago MD   10 mL at 07/26/18 1012    sodium chloride flush 0.9 % injection 10 mL  10 mL Intravenous PRN Sosa Santiago MD           Allergies  Insect extract allergy skin test; Prednisone; Zanaflex [tizanidine hcl]; Lortab [hydrocodone-acetaminophen]; Other; Oxycodone-acetaminophen; and Ultram [tramadol hcl]      ASSESSMENT/PLAN:  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 Noncompliance with warfarin due to financial restraint.     · Subtherapeutic INR of 1.27 at admission   · Heparin drip and warfarin- continue until therapeutic INR while bridging with warfarin. Continue with pharmacy dosing warfarin. · Recommend continuing long-term anticoagulation with warfarin, Will consult  to assist with financial restraint. · IVC filter in place.   · INR 1.31 today  · LOWER EXTREMITY

## 2018-07-27 NOTE — PROGRESS NOTES
Eyes: Lids are normal. Pupils are equal, round, and reactive to light. Neck: Neck supple. No JVD present. Carotid bruit is not present. Cardiovascular: Normal rate and regular rhythm. Pulses:       Carotid pulses are 2+ on the right side, and 2+ on the left side. Radial pulses are 2+ on the right side, and 2+ on the left side. Pulmonary/Chest: She has no decreased breath sounds. She has no wheezes. She has no rhonchi. She has no rales. Abdominal: Soft. She exhibits no distension. There is no tenderness. Musculoskeletal:        Right lower leg: She exhibits swelling and edema. Left lower leg: She exhibits swelling and edema. Left greater than right. Pain on left thigh and calf. Neurological: She is alert and oriented to person, place, and time. GCS eye subscore is 4. GCS verbal subscore is 5. GCS motor subscore is 6. Skin: Skin is warm and dry. Psychiatric: She has a normal mood and affect. Medications:      heparin (porcine) 8.018 Units/kg/hr (07/27/18 2085)      warfarin  12.5 mg Oral Once    [START ON 7/28/2018] iron sucrose  300 mg Intravenous Q24H    iron sucrose  400 mg Intravenous Once    gabapentin  300 mg Oral BID    sodium chloride flush  10 mL Intravenous 2 times per day    escitalopram  10 mg Oral Daily    levothyroxine  100 mcg Oral Daily    lisinopril  20 mg Oral Daily    pantoprazole  40 mg Oral BID    rOPINIRole  1 mg Oral TID    warfarin (COUMADIN) daily dosing (placeholder)   Other RX Placeholder    sodium chloride flush  10 mL Intravenous 2 times per day     HYDROmorphone, ondansetron, sodium chloride flush, acetaminophen, heparin (porcine), heparin (porcine), hydrALAZINE, sodium chloride flush  DIET GENERAL;     DVT Prophylaxis: Heparin drip and Coumadin.     Lab and other Data:     Recent Labs      07/25/18   0848  07/26/18   0325  07/27/18   0200   WBC  8.4  7.3  9.1   HGB  9.9*  8.7*  9.0*   PLT  221  210  217     Recent Labs      07/24/18 2035

## 2018-07-27 NOTE — PLAN OF CARE
Problem: Falls - Risk of:  Goal: Will remain free from falls  Will remain free from falls   Outcome: Ongoing    Goal: Absence of physical injury  Absence of physical injury   Outcome: Ongoing      Problem: ABCDS Injury Assessment  Goal: Absence of physical injury  Outcome: Ongoing      Problem: Infection:  Goal: Will remain free from infection  Will remain free from infection   Outcome: Ongoing      Problem: Safety:  Goal: Free from accidental physical injury  Free from accidental physical injury   Outcome: Ongoing    Goal: Free from intentional harm  Free from intentional harm   Outcome: Ongoing      Problem: Daily Care:  Goal: Daily care needs are met  Daily care needs are met   Outcome: Ongoing      Problem: Pain:  Goal: Patient's pain/discomfort is manageable  Patient's pain/discomfort is manageable   Outcome: Ongoing      Problem: Skin Integrity:  Goal: Skin integrity will stabilize  Skin integrity will stabilize   Outcome: Ongoing      Problem: Discharge Planning:  Goal: Patients continuum of care needs are met  Patients continuum of care needs are met   Outcome: Ongoing      Problem: Skin Integrity:  Goal: Will show no infection signs and symptoms  Will show no infection signs and symptoms   Outcome: Ongoing    Goal: Absence of new skin breakdown  Absence of new skin breakdown   Outcome: Ongoing      Problem: Infection - Risk of, Central Venous Catheter-Associated Bloodstream Infection  Goal: Absence of central venous catheter-associated bloodstream infection  Outcome: Ongoing

## 2018-07-28 LAB
ANION GAP SERPL CALCULATED.3IONS-SCNC: 13 MMOL/L (ref 7–19)
APTT: 72.5 SEC (ref 26–36.2)
APTT: 74.8 SEC (ref 26–36.2)
APTT: 80.8 SEC (ref 26–36.2)
APTT: 84.1 SEC (ref 26–36.2)
BASOPHILS ABSOLUTE: 0 K/UL (ref 0–0.2)
BASOPHILS RELATIVE PERCENT: 0.2 % (ref 0–1)
BUN BLDV-MCNC: 17 MG/DL (ref 8–23)
CALCIUM SERPL-MCNC: 8.4 MG/DL (ref 8.8–10.2)
CHLORIDE BLD-SCNC: 101 MMOL/L (ref 98–111)
CO2: 23 MMOL/L (ref 22–29)
CREAT SERPL-MCNC: 1.1 MG/DL (ref 0.5–0.9)
EOSINOPHILS ABSOLUTE: 0.2 K/UL (ref 0–0.6)
EOSINOPHILS RELATIVE PERCENT: 2 % (ref 0–5)
GFR NON-AFRICAN AMERICAN: 49
GLUCOSE BLD-MCNC: 86 MG/DL (ref 74–109)
HCT VFR BLD CALC: 28 % (ref 37–47)
HEMOGLOBIN: 8.9 G/DL (ref 12–16)
INR BLD: 1.44 (ref 0.88–1.18)
LYMPHOCYTES ABSOLUTE: 2.2 K/UL (ref 1.1–4.5)
LYMPHOCYTES RELATIVE PERCENT: 21.3 % (ref 20–40)
MCH RBC QN AUTO: 27.1 PG (ref 27–31)
MCHC RBC AUTO-ENTMCNC: 31.8 G/DL (ref 33–37)
MCV RBC AUTO: 85.1 FL (ref 81–99)
MONOCYTES ABSOLUTE: 1.6 K/UL (ref 0–0.9)
MONOCYTES RELATIVE PERCENT: 15.6 % (ref 0–10)
NEUTROPHILS ABSOLUTE: 6.3 K/UL (ref 1.5–7.5)
NEUTROPHILS RELATIVE PERCENT: 60 % (ref 50–65)
PDW BLD-RTO: 15.5 % (ref 11.5–14.5)
PLATELET # BLD: 216 K/UL (ref 130–400)
PMV BLD AUTO: 12.4 FL (ref 9.4–12.3)
POTASSIUM SERPL-SCNC: 4 MMOL/L (ref 3.5–5)
PROTHROMBIN TIME: 17.5 SEC (ref 12–14.6)
RBC # BLD: 3.29 M/UL (ref 4.2–5.4)
SODIUM BLD-SCNC: 137 MMOL/L (ref 136–145)
WBC # BLD: 10.5 K/UL (ref 4.8–10.8)

## 2018-07-28 PROCEDURE — 2580000003 HC RX 258: Performed by: INTERNAL MEDICINE

## 2018-07-28 PROCEDURE — 99232 SBSQ HOSP IP/OBS MODERATE 35: CPT | Performed by: FAMILY MEDICINE

## 2018-07-28 PROCEDURE — 6370000000 HC RX 637 (ALT 250 FOR IP): Performed by: INTERNAL MEDICINE

## 2018-07-28 PROCEDURE — 85025 COMPLETE CBC W/AUTO DIFF WBC: CPT

## 2018-07-28 PROCEDURE — 2580000003 HC RX 258: Performed by: NURSE PRACTITIONER

## 2018-07-28 PROCEDURE — 85610 PROTHROMBIN TIME: CPT

## 2018-07-28 PROCEDURE — 85730 THROMBOPLASTIN TIME PARTIAL: CPT

## 2018-07-28 PROCEDURE — 36592 COLLECT BLOOD FROM PICC: CPT

## 2018-07-28 PROCEDURE — 2580000003 HC RX 258: Performed by: FAMILY MEDICINE

## 2018-07-28 PROCEDURE — 6360000002 HC RX W HCPCS: Performed by: NURSE PRACTITIONER

## 2018-07-28 PROCEDURE — 1210000000 HC MED SURG R&B

## 2018-07-28 PROCEDURE — 6370000000 HC RX 637 (ALT 250 FOR IP): Performed by: FAMILY MEDICINE

## 2018-07-28 PROCEDURE — 6360000002 HC RX W HCPCS: Performed by: INTERNAL MEDICINE

## 2018-07-28 PROCEDURE — 80048 BASIC METABOLIC PNL TOTAL CA: CPT

## 2018-07-28 PROCEDURE — 36591 DRAW BLOOD OFF VENOUS DEVICE: CPT

## 2018-07-28 RX ADMIN — HYDROMORPHONE HYDROCHLORIDE 2 MG: 2 TABLET ORAL at 20:00

## 2018-07-28 RX ADMIN — PANTOPRAZOLE SODIUM 40 MG: 40 TABLET, DELAYED RELEASE ORAL at 08:21

## 2018-07-28 RX ADMIN — IRON SUCROSE 300 MG: 20 INJECTION, SOLUTION INTRAVENOUS at 12:44

## 2018-07-28 RX ADMIN — Medication 10 ML: at 20:01

## 2018-07-28 RX ADMIN — Medication 10 ML: at 20:00

## 2018-07-28 RX ADMIN — ESCITALOPRAM OXALATE 10 MG: 10 TABLET ORAL at 08:21

## 2018-07-28 RX ADMIN — ROPINIROLE HYDROCHLORIDE 1 MG: 1 TABLET, FILM COATED ORAL at 08:21

## 2018-07-28 RX ADMIN — GABAPENTIN 300 MG: 300 CAPSULE ORAL at 08:21

## 2018-07-28 RX ADMIN — HEPARIN SODIUM AND DEXTROSE 4 UNITS/KG/HR: 10000; 5 INJECTION INTRAVENOUS at 17:02

## 2018-07-28 RX ADMIN — ROPINIROLE HYDROCHLORIDE 1 MG: 1 TABLET, FILM COATED ORAL at 20:00

## 2018-07-28 RX ADMIN — GABAPENTIN 300 MG: 300 CAPSULE ORAL at 20:00

## 2018-07-28 RX ADMIN — LEVOTHYROXINE SODIUM 100 MCG: 100 TABLET ORAL at 05:45

## 2018-07-28 RX ADMIN — PANTOPRAZOLE SODIUM 40 MG: 40 TABLET, DELAYED RELEASE ORAL at 20:00

## 2018-07-28 RX ADMIN — WARFARIN SODIUM 12.5 MG: 2.5 TABLET ORAL at 20:04

## 2018-07-28 RX ADMIN — ROPINIROLE HYDROCHLORIDE 1 MG: 1 TABLET, FILM COATED ORAL at 14:52

## 2018-07-28 RX ADMIN — HEPARIN SODIUM AND DEXTROSE 6 UNITS/KG/HR: 10000; 5 INJECTION INTRAVENOUS at 09:48

## 2018-07-28 RX ADMIN — Medication 10 ML: at 10:01

## 2018-07-28 RX ADMIN — LISINOPRIL 20 MG: 20 TABLET ORAL at 08:21

## 2018-07-28 ASSESSMENT — ENCOUNTER SYMPTOMS
CONSTIPATION: 0
EYE PAIN: 0
SPUTUM PRODUCTION: 0
EYE DISCHARGE: 0
HEMOPTYSIS: 0
GASTROINTESTINAL NEGATIVE: 1
ABDOMINAL PAIN: 0
RESPIRATORY NEGATIVE: 1
EYES NEGATIVE: 1
HEARTBURN: 0
BACK PAIN: 0
WHEEZING: 0
BLOOD IN STOOL: 0
NAUSEA: 0
DOUBLE VISION: 0
DIARRHEA: 0
VOMITING: 0
SORE THROAT: 0
EYE REDNESS: 0
COUGH: 0
ORTHOPNEA: 0
SHORTNESS OF BREATH: 0
BLURRED VISION: 0

## 2018-07-28 ASSESSMENT — PAIN SCALES - GENERAL
PAINLEVEL_OUTOF10: 9
PAINLEVEL_OUTOF10: 1
PAINLEVEL_OUTOF10: 1

## 2018-07-28 NOTE — PROGRESS NOTES
history of bilateral pulmonary emboli with recurrent subtherapeutic INR with warfarin. She also received IV iron replacement.      Bone marrow aspiration and biopsy was completed on 8/31/2007 documented left shifted myeloid maturation and dysmaturation. 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 Normocellular bone marrow for age. Decreased storage iron and no ringed sideroblasts. Moderate increase of reticulin fibers.           Review of Systems   Constitutional: Positive for malaise/fatigue. Negative for chills, diaphoresis, fever and weight loss. HENT: Negative. Negative for congestion, ear pain, hearing loss, nosebleeds, sore throat and tinnitus. Eyes: Negative. Negative for blurred vision, double vision, pain, discharge and redness. Respiratory: Negative. Negative for cough, hemoptysis, sputum production, shortness of breath and wheezing. Cardiovascular: Positive for leg swelling. Negative for chest pain, palpitations, orthopnea and claudication. Left lower extremity   Gastrointestinal: Negative. Negative for abdominal pain, blood in stool, constipation, diarrhea, heartburn, melena, nausea and vomiting. Genitourinary: Negative for dysuria, flank pain, frequency, hematuria and urgency. Musculoskeletal: Negative. Negative for back pain, falls, joint pain, myalgias and neck pain. Skin: Negative. Negative for itching and rash. Neurological: Positive for weakness. Negative for dizziness, tingling, tremors, sensory change, speech change, focal weakness, seizures, loss of consciousness and headaches. Endo/Heme/Allergies: Negative. Negative for polydipsia. Does not bruise/bleed easily. Psychiatric/Behavioral: Negative. Negative for depression and memory loss. The patient is not nervous/anxious.         Current Pain Assessment  Pain Assessment  Pain Assessment: 0-10  Pain Level: 0  Response to Pain Intervention: Patient Satisfied    OBJECTIVE:  VITALS: /74   Pulse 86   Temp 98.8 of the posterior fossa are unremarkable. The included orbits and their contents are unremarkable. The visualized paranasal sinuses, mastoid air cells and middle ear cavities are clear. The visualized osseous structures and overlying soft tissues of the skull and face are unremarkable. 1.  No acute intracranial process. Signed by Dr Windell Meigs on 7/13/2018 7:12 PM    Ct Facial Bones Wo Contrast    Result Date: 7/13/2018  CT FACIAL BONES WO CONTRAST 7/13/2018 5:15 PM HISTORY: Facial trauma COMPARISON: None DLP: 560 mGy cm. In order to have a CT radiation dose as low as reasonably achievable, Automated Exposure Control was utilized for adjustment of the mA and/or KV according to patient size. TECHNIQUE: Serial helical tomographic images of the facial bones were obtained without the use of intravenous contrast. Additionally, multiplanar reformats in the axial and coronal planes were also obtained. FINDINGS: Calcifications are seen in the involuted right globe. Soft tissue air is present in the left temporal and mandibular regions. This is likely due to acute trauma. No facial bone fractures are identified. The sinuses are clear. The orbital floors are intact. 1.  Soft tissue injury to the left aspect of the face without evidence of fracture. Signed by Dr Windell Meigs on 7/13/2018 7:14 PM    Ct Cervical Spine Wo Contrast    Result Date: 7/13/2018  CT CERVICAL SPINE WO CONTRAST 7/13/2018 5:15 PM HISTORY: Syncope and fall, neck pain COMPARISON: 7/20/2017 DLP: 559 mGy cm. In order to have a CT radiation dose as low as reasonably achievable, Automated Exposure Control was utilized for adjustment of the mA and/or KV according to patient size. TECHNIQUE: Serial helical tomographic images of the cervical spine were obtained without the use of intravenous contrast. Additionally, sagittal and coronal reformatted images were also provided for review.  FINDINGS: Multilevel degenerative changes are seen in the cervical Units/kg/hr Intravenous Continuous Carito Caal MD 5 mL/hr at 07/28/18 1046 5 Units/kg/hr at 07/28/18 1046    hydrALAZINE (APRESOLINE) injection 10 mg  10 mg Intravenous Q6H PRN Judson Caal MD   10 mg at 07/25/18 1000    escitalopram (LEXAPRO) tablet 10 mg  10 mg Oral Daily Judson Caal MD   10 mg at 07/28/18 4667    levothyroxine (SYNTHROID) tablet 100 mcg  100 mcg Oral Daily Judson Caal MD   100 mcg at 07/28/18 0545    lisinopril (PRINIVIL;ZESTRIL) tablet 20 mg  20 mg Oral Daily Judson Caal MD   20 mg at 07/28/18 4237    pantoprazole (PROTONIX) tablet 40 mg  40 mg Oral BID Eric Paras Caal MD   40 mg at 07/28/18 8816    rOPINIRole (REQUIP) tablet 1 mg  1 mg Oral TID Teodora Wright MD   1 mg at 07/28/18 6929    warfarin (COUMADIN) daily dosing (placeholder)   Other RX Placeholder Teodora Wright MD        sodium chloride flush 0.9 % injection 10 mL  10 mL Intravenous 2 times per day Yana Moy MD   10 mL at 07/28/18 1001    sodium chloride flush 0.9 % injection 10 mL  10 mL Intravenous PRN Yana Moy MD           Allergies  Insect extract allergy skin test; Prednisone; Zanaflex [tizanidine hcl]; Lortab [hydrocodone-acetaminophen]; Other; Oxycodone-acetaminophen; and Ultram [tramadol hcl]      ASSESSMENT/PLAN:  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 Noncompliance with warfarin due to financial restraint.     · Subtherapeutic INR of 1.27 at admission   · Heparin drip and warfarin- continue until therapeutic INR while bridging with warfarin. Continue with pharmacy dosing warfarin. · Recommend continuing long-term anticoagulation with warfarin, Will consult  to assist with financial restraint. · IVC filter in place.   · INR 1.44 today  · LOWER EXTREMITY BILATERAL VENOUS  DUPLEX  On 7/25/2018 - chronic deep vein thrombosis in the right lower  extremity involving the femoral,

## 2018-07-28 NOTE — PROGRESS NOTES
07/27/18   0200  07/28/18   0410   NA  137  136  137   K  3.9  4.4  4.0   CL  102  102  101   CO2  25  22  23   BUN  14  17  17   CREATININE  1.1*  1.2*  1.1*   GLUCOSE  90  91  86     No results for input(s): AST, ALT, ALB, BILITOT, ALKPHOS in the last 72 hours. INR:   Recent Labs      07/26/18   0325  07/27/18   0200  07/28/18   0410   INR  1.29*  1.31*  1.44*       Patient Active Problem List    Diagnosis Date Noted    Stable angina [facility])      Priority: High    DVT, lower extremity, proximal, acute, unspecified laterality ([facility] [address] 07/24/2018    Restrictive airway disease 03/27/2018    Arthritis of knee 03/26/2018    Essential hypertension 03/26/2018    Hyperglycemia 03/26/2018    MER (obstructive sleep apnea) 03/26/2018    Slow transit constipation 03/26/2018    Iron deficiency anemia 03/26/2018    Primary osteoarthritis of right knee 03/22/2018    Encounter for current long-term use of anticoagulants 02/22/2018    Nausea and vomiting     Colon cancer screening     Enuresis 08/28/2014    Vomiting 05/02/2014    Burping 05/02/2014    GERD (gastroesophageal reflux disease) 05/02/2014    History of gastric bypass 05/02/2014    Family history of colon cancer 05/02/2014    Bloating 05/02/2014       Assessment/plan:   Principal Problem:    DVT, lower extremity, proximal, acute, unspecified laterality (HCC)  Active Problems:    Stable angina (HCC)    GERD (gastroesophageal reflux disease)    History of gastric bypass    Essential hypertension    Restrictive airway disease  Resolved Problems:    * No resolved hospital problems. *      Plan:     1. Continue care, see above and orders. 2. Heparin drip to Coumadin. Vascular and oncology inputs noted, agree with continuing coumadin/Heparin to INR over 2.  3. Pain control. 4. PT/OT evaluation and treatment. 5. Prognosis fair  6. Disposition continue inpatient care. 7. Discharge disposition: Patient preference for home with family assistance.        Dorian Magaña

## 2018-07-29 LAB
ANION GAP SERPL CALCULATED.3IONS-SCNC: 14 MMOL/L (ref 7–19)
APTT: 57.7 SEC (ref 26–36.2)
APTT: 69.7 SEC (ref 26–36.2)
APTT: 81.8 SEC (ref 26–36.2)
BASOPHILS ABSOLUTE: 0 K/UL (ref 0–0.2)
BASOPHILS RELATIVE PERCENT: 0.4 % (ref 0–1)
BUN BLDV-MCNC: 17 MG/DL (ref 8–23)
CALCIUM SERPL-MCNC: 8.4 MG/DL (ref 8.8–10.2)
CHLORIDE BLD-SCNC: 101 MMOL/L (ref 98–111)
CO2: 21 MMOL/L (ref 22–29)
CREAT SERPL-MCNC: 1.1 MG/DL (ref 0.5–0.9)
DRVVT CONFIRMATION TEST: ABNORMAL RATIO
DRVVT SCREEN: 33 SEC (ref 33–44)
DRVVT,DIL: ABNORMAL SEC (ref 33–44)
EOSINOPHILS ABSOLUTE: 0.3 K/UL (ref 0–0.6)
EOSINOPHILS RELATIVE PERCENT: 3.4 % (ref 0–5)
GFR NON-AFRICAN AMERICAN: 49
GLUCOSE BLD-MCNC: 77 MG/DL (ref 74–109)
HCT VFR BLD CALC: 28.8 % (ref 37–47)
HEMOGLOBIN: 8.9 G/DL (ref 12–16)
HEXAGONAL PHOSPHOLIPID NEUTRALIZAT TEST: ABNORMAL
INR BLD: 1.58 (ref 0.88–1.18)
INR BLD: 1.63 (ref 0.88–1.18)
LUPUS ANTICOAG INTERP: ABNORMAL
LYMPHOCYTES ABSOLUTE: 2.5 K/UL (ref 1.1–4.5)
LYMPHOCYTES RELATIVE PERCENT: 26 % (ref 20–40)
MCH RBC QN AUTO: 27 PG (ref 27–31)
MCHC RBC AUTO-ENTMCNC: 30.9 G/DL (ref 33–37)
MCV RBC AUTO: 87.3 FL (ref 81–99)
MONOCYTES ABSOLUTE: 1.3 K/UL (ref 0–0.9)
MONOCYTES RELATIVE PERCENT: 13.3 % (ref 0–10)
NEUTROPHILS ABSOLUTE: 5.2 K/UL (ref 1.5–7.5)
NEUTROPHILS RELATIVE PERCENT: 55.1 % (ref 50–65)
PDW BLD-RTO: 15.7 % (ref 11.5–14.5)
PLATELET # BLD: 212 K/UL (ref 130–400)
PLT NEUTA: ABNORMAL
PMV BLD AUTO: 13.6 FL (ref 9.4–12.3)
POTASSIUM SERPL-SCNC: 4.3 MMOL/L (ref 3.5–5)
PROTHROMBIN TIME: 18.8 SEC (ref 12–14.6)
PROTHROMBIN TIME: 19.3 SEC (ref 12–14.6)
PT D: 16.8 SEC (ref 12–15.5)
PTT D: 53 SEC (ref 32–48)
PTT-D CORR REFLEX: ABNORMAL SEC (ref 32–48)
PTT-HEPARIN NEUTRALIZED: 41 SEC (ref 32–48)
RBC # BLD: 3.3 M/UL (ref 4.2–5.4)
REPTILASE TIME: 18.6 SEC
SODIUM BLD-SCNC: 136 MMOL/L (ref 136–145)
THROMBIN TIME: 32.3 SEC (ref 14.7–19.5)
WBC # BLD: 9.4 K/UL (ref 4.8–10.8)

## 2018-07-29 PROCEDURE — 6360000002 HC RX W HCPCS: Performed by: NURSE PRACTITIONER

## 2018-07-29 PROCEDURE — 85025 COMPLETE CBC W/AUTO DIFF WBC: CPT

## 2018-07-29 PROCEDURE — 85730 THROMBOPLASTIN TIME PARTIAL: CPT

## 2018-07-29 PROCEDURE — 99232 SBSQ HOSP IP/OBS MODERATE 35: CPT | Performed by: FAMILY MEDICINE

## 2018-07-29 PROCEDURE — 6360000002 HC RX W HCPCS: Performed by: INTERNAL MEDICINE

## 2018-07-29 PROCEDURE — G8978 MOBILITY CURRENT STATUS: HCPCS

## 2018-07-29 PROCEDURE — 6370000000 HC RX 637 (ALT 250 FOR IP): Performed by: INTERNAL MEDICINE

## 2018-07-29 PROCEDURE — 36415 COLL VENOUS BLD VENIPUNCTURE: CPT

## 2018-07-29 PROCEDURE — 6370000000 HC RX 637 (ALT 250 FOR IP): Performed by: FAMILY MEDICINE

## 2018-07-29 PROCEDURE — 1210000000 HC MED SURG R&B

## 2018-07-29 PROCEDURE — 97161 PT EVAL LOW COMPLEX 20 MIN: CPT

## 2018-07-29 PROCEDURE — 85610 PROTHROMBIN TIME: CPT

## 2018-07-29 PROCEDURE — G8979 MOBILITY GOAL STATUS: HCPCS

## 2018-07-29 PROCEDURE — 2580000003 HC RX 258: Performed by: NURSE PRACTITIONER

## 2018-07-29 PROCEDURE — 80048 BASIC METABOLIC PNL TOTAL CA: CPT

## 2018-07-29 RX ORDER — WARFARIN SODIUM 5 MG/1
15 TABLET ORAL
Status: COMPLETED | OUTPATIENT
Start: 2018-07-29 | End: 2018-07-29

## 2018-07-29 RX ORDER — WARFARIN SODIUM 5 MG/1
15 TABLET ORAL
Status: DISCONTINUED | OUTPATIENT
Start: 2018-07-29 | End: 2018-07-29

## 2018-07-29 RX ADMIN — WARFARIN SODIUM 15 MG: 5 TABLET ORAL at 16:55

## 2018-07-29 RX ADMIN — GABAPENTIN 300 MG: 300 CAPSULE ORAL at 08:22

## 2018-07-29 RX ADMIN — IRON SUCROSE 300 MG: 20 INJECTION, SOLUTION INTRAVENOUS at 08:21

## 2018-07-29 RX ADMIN — PANTOPRAZOLE SODIUM 40 MG: 40 TABLET, DELAYED RELEASE ORAL at 21:07

## 2018-07-29 RX ADMIN — GABAPENTIN 300 MG: 300 CAPSULE ORAL at 21:07

## 2018-07-29 RX ADMIN — HEPARIN SODIUM AND DEXTROSE 3.01 UNITS/KG/HR: 10000; 5 INJECTION INTRAVENOUS at 20:48

## 2018-07-29 RX ADMIN — LISINOPRIL 20 MG: 20 TABLET ORAL at 08:22

## 2018-07-29 RX ADMIN — PANTOPRAZOLE SODIUM 40 MG: 40 TABLET, DELAYED RELEASE ORAL at 08:22

## 2018-07-29 RX ADMIN — ESCITALOPRAM OXALATE 10 MG: 10 TABLET ORAL at 08:22

## 2018-07-29 RX ADMIN — ROPINIROLE HYDROCHLORIDE 1 MG: 1 TABLET, FILM COATED ORAL at 21:07

## 2018-07-29 RX ADMIN — LEVOTHYROXINE SODIUM 100 MCG: 100 TABLET ORAL at 05:03

## 2018-07-29 RX ADMIN — ROPINIROLE HYDROCHLORIDE 1 MG: 1 TABLET, FILM COATED ORAL at 08:22

## 2018-07-29 RX ADMIN — ROPINIROLE HYDROCHLORIDE 1 MG: 1 TABLET, FILM COATED ORAL at 16:55

## 2018-07-29 NOTE — PLAN OF CARE
Problem: Falls - Risk of:  Goal: Will remain free from falls  Will remain free from falls   Outcome: Ongoing    Goal: Absence of physical injury  Absence of physical injury   Outcome: Ongoing      Problem: ABCDS Injury Assessment  Goal: Absence of physical injury  Outcome: Ongoing      Problem: Infection:  Goal: Will remain free from infection  Will remain free from infection   Outcome: Ongoing      Problem: Safety:  Goal: Free from accidental physical injury  Free from accidental physical injury   Outcome: Ongoing    Goal: Free from intentional harm  Free from intentional harm   Outcome: Ongoing      Problem: Daily Care:  Goal: Daily care needs are met  Daily care needs are met   Outcome: Ongoing      Problem: Pain:  Goal: Patient's pain/discomfort is manageable  Patient's pain/discomfort is manageable   Outcome: Ongoing      Problem: Skin Integrity:  Goal: Skin integrity will stabilize  Skin integrity will stabilize   Outcome: Ongoing

## 2018-07-29 NOTE — PROGRESS NOTES
continue inpatient care. 7. Discharge disposition: Patient preference for home with family assistance.        Jazmin Tuttle MD  Hospitalist Service  7/29/2018  2:14 PM

## 2018-07-29 NOTE — PROGRESS NOTES
Clinical Pharmacy Note    Warfarin consult follow-up    Recent Labs      07/29/18   1230   INR  1.58*     Recent Labs      07/27/18   0200  07/28/18   0410  07/29/18   0356   HGB  9.0*  8.9*  8.9*   HCT  28.2*  28.0*  28.8*   PLT  217  216  212       Significant Drug-Drug Interactions:  New warfarin drug-drug interactions: none  Discontinued drug-drug interactions: none    Date INR Warfarin Dose   07/24/18 1.27 15mg per order PCP  After DVT suspected  Had been off Warfarin approx 1 week   07/25/18 1.37 10mg     07/26/18 1.29  10 mg     07/27/18 1.31  12.5 mg     07/28/18 1.44   12.5 mg   07/29/18  1.58  15 mg              Notes: Give Coumadin 15 mg x 1 dose tonight                     Daily PT/INR until stable within therapeutic range.      Electronically signed by Jamison Horner, [address] [place] [address] on 7/29/2018 at 1:49 PM

## 2018-07-30 LAB
ANION GAP SERPL CALCULATED.3IONS-SCNC: 12 MMOL/L (ref 7–19)
APTT: 55.8 SEC (ref 26–36.2)
APTT: 59.9 SEC (ref 26–36.2)
APTT: 62.4 SEC (ref 26–36.2)
APTT: 81 SEC (ref 26–36.2)
BASOPHILS ABSOLUTE: 0 K/UL (ref 0–0.2)
BASOPHILS RELATIVE PERCENT: 0.2 % (ref 0–1)
BUN BLDV-MCNC: 15 MG/DL (ref 8–23)
CALCIUM SERPL-MCNC: 8.4 MG/DL (ref 8.8–10.2)
CHLORIDE BLD-SCNC: 101 MMOL/L (ref 98–111)
CO2: 21 MMOL/L (ref 22–29)
CREAT SERPL-MCNC: 1.1 MG/DL (ref 0.5–0.9)
EOSINOPHILS ABSOLUTE: 0.3 K/UL (ref 0–0.6)
EOSINOPHILS RELATIVE PERCENT: 3.8 % (ref 0–5)
GFR NON-AFRICAN AMERICAN: 49
GLUCOSE BLD-MCNC: 89 MG/DL (ref 74–109)
HCT VFR BLD CALC: 27.8 % (ref 37–47)
HEMOGLOBIN: 9 G/DL (ref 12–16)
INR BLD: 1.77 (ref 0.88–1.18)
LYMPHOCYTES ABSOLUTE: 1.9 K/UL (ref 1.1–4.5)
LYMPHOCYTES RELATIVE PERCENT: 21.8 % (ref 20–40)
MCH RBC QN AUTO: 27.4 PG (ref 27–31)
MCHC RBC AUTO-ENTMCNC: 32.4 G/DL (ref 33–37)
MCV RBC AUTO: 84.5 FL (ref 81–99)
MONOCYTES ABSOLUTE: 1 K/UL (ref 0–0.9)
MONOCYTES RELATIVE PERCENT: 11.5 % (ref 0–10)
NEUTROPHILS ABSOLUTE: 5.4 K/UL (ref 1.5–7.5)
NEUTROPHILS RELATIVE PERCENT: 61.1 % (ref 50–65)
PDW BLD-RTO: 15.6 % (ref 11.5–14.5)
PLATELET # BLD: 225 K/UL (ref 130–400)
PMV BLD AUTO: 12.7 FL (ref 9.4–12.3)
POTASSIUM SERPL-SCNC: 4.3 MMOL/L (ref 3.5–5)
PROTHROMBIN TIME: 20.6 SEC (ref 12–14.6)
RBC # BLD: 3.29 M/UL (ref 4.2–5.4)
SODIUM BLD-SCNC: 134 MMOL/L (ref 136–145)
WBC # BLD: 8.8 K/UL (ref 4.8–10.8)

## 2018-07-30 PROCEDURE — 85610 PROTHROMBIN TIME: CPT

## 2018-07-30 PROCEDURE — 99232 SBSQ HOSP IP/OBS MODERATE 35: CPT | Performed by: INTERNAL MEDICINE

## 2018-07-30 PROCEDURE — 85025 COMPLETE CBC W/AUTO DIFF WBC: CPT

## 2018-07-30 PROCEDURE — 6360000002 HC RX W HCPCS: Performed by: INTERNAL MEDICINE

## 2018-07-30 PROCEDURE — G8987 SELF CARE CURRENT STATUS: HCPCS

## 2018-07-30 PROCEDURE — 97165 OT EVAL LOW COMPLEX 30 MIN: CPT

## 2018-07-30 PROCEDURE — 51702 INSERT TEMP BLADDER CATH: CPT

## 2018-07-30 PROCEDURE — 85730 THROMBOPLASTIN TIME PARTIAL: CPT

## 2018-07-30 PROCEDURE — G8988 SELF CARE GOAL STATUS: HCPCS

## 2018-07-30 PROCEDURE — 80048 BASIC METABOLIC PNL TOTAL CA: CPT

## 2018-07-30 PROCEDURE — 6370000000 HC RX 637 (ALT 250 FOR IP): Performed by: INTERNAL MEDICINE

## 2018-07-30 PROCEDURE — 36415 COLL VENOUS BLD VENIPUNCTURE: CPT

## 2018-07-30 PROCEDURE — 6370000000 HC RX 637 (ALT 250 FOR IP): Performed by: FAMILY MEDICINE

## 2018-07-30 PROCEDURE — 2580000003 HC RX 258: Performed by: INTERNAL MEDICINE

## 2018-07-30 PROCEDURE — G8989 SELF CARE D/C STATUS: HCPCS

## 2018-07-30 PROCEDURE — 1210000000 HC MED SURG R&B

## 2018-07-30 PROCEDURE — 2580000003 HC RX 258: Performed by: FAMILY MEDICINE

## 2018-07-30 RX ADMIN — WARFARIN SODIUM 12.5 MG: 2.5 TABLET ORAL at 17:52

## 2018-07-30 RX ADMIN — PANTOPRAZOLE SODIUM 40 MG: 40 TABLET, DELAYED RELEASE ORAL at 21:34

## 2018-07-30 RX ADMIN — ROPINIROLE HYDROCHLORIDE 1 MG: 1 TABLET, FILM COATED ORAL at 15:58

## 2018-07-30 RX ADMIN — GABAPENTIN 300 MG: 300 CAPSULE ORAL at 09:25

## 2018-07-30 RX ADMIN — Medication 10 ML: at 09:25

## 2018-07-30 RX ADMIN — Medication 10 ML: at 21:34

## 2018-07-30 RX ADMIN — Medication 10 ML: at 09:26

## 2018-07-30 RX ADMIN — ROPINIROLE HYDROCHLORIDE 1 MG: 1 TABLET, FILM COATED ORAL at 09:25

## 2018-07-30 RX ADMIN — LISINOPRIL 20 MG: 20 TABLET ORAL at 09:25

## 2018-07-30 RX ADMIN — LEVOTHYROXINE SODIUM 100 MCG: 100 TABLET ORAL at 06:16

## 2018-07-30 RX ADMIN — ESCITALOPRAM OXALATE 10 MG: 10 TABLET ORAL at 09:25

## 2018-07-30 RX ADMIN — ROPINIROLE HYDROCHLORIDE 1 MG: 1 TABLET, FILM COATED ORAL at 21:34

## 2018-07-30 RX ADMIN — GABAPENTIN 300 MG: 300 CAPSULE ORAL at 21:34

## 2018-07-30 RX ADMIN — HEPARIN SODIUM AND DEXTROSE 2 UNITS/KG/HR: 10000; 5 INJECTION INTRAVENOUS at 00:39

## 2018-07-30 RX ADMIN — PANTOPRAZOLE SODIUM 40 MG: 40 TABLET, DELAYED RELEASE ORAL at 09:25

## 2018-07-30 ASSESSMENT — PAIN SCALES - GENERAL
PAINLEVEL_OUTOF10: 8
PAINLEVEL_OUTOF10: 0

## 2018-07-30 ASSESSMENT — ENCOUNTER SYMPTOMS
BLURRED VISION: 0
EYE REDNESS: 0
COUGH: 0
NAUSEA: 0
HEARTBURN: 0
RESPIRATORY NEGATIVE: 1
SPUTUM PRODUCTION: 0
SHORTNESS OF BREATH: 0
EYES NEGATIVE: 1
ORTHOPNEA: 0
HEMOPTYSIS: 0
EYE PAIN: 0
WHEEZING: 0
GASTROINTESTINAL NEGATIVE: 1
VOMITING: 0
DOUBLE VISION: 0
SORE THROAT: 0
ABDOMINAL PAIN: 0
DIARRHEA: 0
BLOOD IN STOOL: 0
CONSTIPATION: 0
BACK PAIN: 0
EYE DISCHARGE: 0

## 2018-07-30 ASSESSMENT — PAIN DESCRIPTION - DESCRIPTORS: DESCRIPTORS: ACHING

## 2018-07-30 ASSESSMENT — PAIN DESCRIPTION - ORIENTATION: ORIENTATION: LEFT

## 2018-07-30 ASSESSMENT — PAIN DESCRIPTION - LOCATION: LOCATION: LEG

## 2018-07-30 ASSESSMENT — PAIN DESCRIPTION - PAIN TYPE: TYPE: ACUTE PAIN

## 2018-07-30 NOTE — PROGRESS NOTES
emboli with recurrent subtherapeutic INR with warfarin. She also received IV iron replacement.      Bone marrow aspiration and biopsy was completed on 8/31/2007 documented left shifted myeloid maturation and dysmaturation. 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 Normocellular bone marrow for age. Decreased storage iron and no ringed sideroblasts. Moderate increase of reticulin fibers.           Review of Systems   Constitutional: Positive for malaise/fatigue. Negative for chills, diaphoresis, fever and weight loss. HENT: Negative. Negative for congestion, ear pain, hearing loss, nosebleeds, sore throat and tinnitus. Eyes: Negative. Negative for blurred vision, double vision, pain, discharge and redness. Respiratory: Negative. Negative for cough, hemoptysis, sputum production, shortness of breath and wheezing. Cardiovascular: Positive for leg swelling. Negative for chest pain, palpitations, orthopnea and claudication. Left lower extremity   Gastrointestinal: Negative. Negative for abdominal pain, blood in stool, constipation, diarrhea, heartburn, melena, nausea and vomiting. Genitourinary: Negative for dysuria, flank pain, frequency, hematuria and urgency. Musculoskeletal: Negative. Negative for back pain, falls, joint pain, myalgias and neck pain. Skin: Negative. Negative for itching and rash. Neurological: Positive for weakness. Negative for dizziness, tingling, tremors, sensory change, speech change, focal weakness, seizures, loss of consciousness and headaches. Endo/Heme/Allergies: Negative. Negative for polydipsia. Does not bruise/bleed easily. Psychiatric/Behavioral: Negative. Negative for depression and memory loss. The patient is not nervous/anxious.         Current Pain Assessment  Pain Assessment  Pain Assessment: FLACC  Pain Level: 0  Response to Pain Intervention: Asleep with RR greater than 10    OBJECTIVE:  VITALS: BP (!) 143/77   Pulse 63   Temp 98.1 °F (36.7 °C) 1.1*   GLUCOSE  77  89   CALCIUM  8.4*  8.4*     CBC:   Recent Labs      07/29/18   0356  07/30/18   0000   WBC  9.4  8.8   HGB  8.9*  9.0*   HCT  28.8*  27.8*   MCV  87.3  84.5   PLT  212  225     CMP:    Recent Labs      07/29/18   0356  07/30/18   0000   NA  136  134*   K  4.3  4.3   CL  101  101   CO2  21*  21*   BUN  17  15   CREATININE  1.1*  1.1*   LABGLOM  49*  49*   GLUCOSE  77  89   CALCIUM  8.4*  8.4*       Radiology:   Xr Lumbar Spine (2-3 Views)    Result Date: 7/10/2018  XR LUMBAR SPINE (2-3 VIEWS) 7/10/2018 8:53 AM History: Back pain. Three-view lumbar spine series compared with 08/18/2017. Mildly exaggerated lordosis. No malalignment or compression fracture. Mild endplate spurring. Moderate degenerative facet sclerosis at the lower 3 lumbar levels. There is moderate endplate spurring and disc space narrowing within the lower thoracic spine. Multiple surgical clips and metal suture is seen throughout the abdomen. An IVC filter is present. There has been previous ventral hernia repair. 1. Mild degenerative lumbar spine changes with no acute bony abnormality. Signed by Dr Estuardo Xiao on 7/10/2018 8:58 AM    Ct Head Wo Contrast    Result Date: 7/13/2018  CT HEAD WO CONTRAST 7/13/2018 5:15 PM HISTORY: Syncope and fall COMPARISON: 7/20/2017 DLP: 560 mGy cm. In order to have a CT radiation dose as low as reasonably achievable, Automated Exposure Control was utilized for adjustment of the mA and/or KV according to patient size. TECHNIQUE: Helical tomographic images of the brain were obtained without the use of intravenous contrast. FINDINGS: The midline structures are nondisplaced. The ventricles and basilar cisterns are normal in size and configuration. There is no evidence of intracranial hemorrhage or mass-effect. The gray-white matter differentiation is maintained. The sulci have a normal configuration, and there are no abnormal extra-axial fluid collections.  The structures of the posterior evidence of acute fracture or subluxation. The prevertebral soft tissues are within normal limits. The posterior elements are intact. Vertebral body heights are maintained. The visualized skull base is intact. The visualized thorax demonstrates no acute abnormality. 1.  Degenerative changes in the cervical spine without evidence of acute osseous injury. Signed by Dr Jeff Macedo on 7/13/2018 7:16 PM    Xr Finger Left (min 2 Views)    Result Date: 7/19/2018  EXAMINATION: XR FINGER LEFT (MIN 2 VIEWS) 7/19/2018 1:23 PM HISTORY: Recent fall, persistent pain. Report: 3 views of the left fourth finger were obtained. There are no comparison studies. No fracture or dislocation is identified. The joint spaces are preserved. The soft tissues are within normal limits. No acute osseous abnormality. Signed by Dr Alline Mohs. [place] on 7/19/2018 1:24 PM    Xr Chest Portable    Result Date: 7/13/2018  XR CHEST PORTABLE 7/13/2018 5:15 PM HISTORY: Syncope COMPARISON: 3/9/2018. FINDINGS: Frontal view of the chest was obtained. The lungs are clear. The cardiomediastinal silhouette and pulmonary vascularity are unchanged. The osseous structures and surrounding soft tissues demonstrate no acute abnormality. 1. No radiographic evidence of acute cardiopulmonary process. Signed by Dr Jeff Macedo on 7/13/2018 6:29 PM    Vl Dup Lower Extremity Venous Bilateral    Result Date: 7/25/2018  Vascular Lower Extremities DVT Study Procedure  Demographics   Patient Name      Salud Vásquez  Age                     70   Patient Number    [id number]      Gender                  Female   Visit Number      [id number]   Interpreting Physician  Maria Elena Cardona MD   Date of Birth     1950  Referring Physician     Georgette Oneill   Accession Number  [id number]   [address]  Procedure Type of Study:   Veins:Lower Extremities DVT Study, VL LOWER EXTREMITY BILATERAL VENOUS  DUPLEX .   Indications for Study:Edema, bilateral

## 2018-07-30 NOTE — PROGRESS NOTES
Occupational Therapy   Occupational Therapy Initial Assessment  Date: 2018   Patient Name: Lauri Heath  MRN: [id number]     : 1950    Date of Service: 2018    Discharge Recommendations:            Patient Diagnosis(es): The primary encounter diagnosis was Acute deep vein thrombosis (DVT) of proximal vein of left lower extremity ([facility] [address] Diagnoses of Anticoagulated on Coumadin and Lupus anticoagulant disorder [facility]) were also pertinent to this visit. has a past medical history of Abdominal pain; Abnormal EKG; Acute sinusitis; Allergic reaction to spider bite; Anemia; Anticoagulated; Anxiety; Arrhythmia; Ataxic gait; Lyons's esophagus; Bowel obstruction; Callus; Cardiac pacemaker; CKD (chronic kidney disease) stage 2, GFR 60-89 ml/min; Coat's syndrome; Coat's syndrome; Depression; Diabetes mellitus type 2 in nonobese ([address] Diabetic nephropathy ([address] Disequilibrium; Dizziness; DVT (deep venous thrombosis) ([address] Exudative retinopathy; Fibromyalgia; Fibromyositis; Gastric ulcer; GERD (gastroesophageal reflux disease); History of gastric bypass; Hx of lupus anticoagulant disorder; Hypertension; Hypothyroidism; Intermittent claudication ([address] Intestinal obstruction; Iron deficiency; Left-sided weakness; Low vitamin D level; Lupus; Menopause; Obesity; Osteoarthritis; Osteoporosis; Pernicious anemia; PUPP (pruritic urticarial papules and plaques of pregnancy); Right leg numbness; Right sided sciatica; Sarcoidosis ([address] Sciatica; Secondary hyperparathyroidism ([address] Shingles; Shortness of breath; Sleep apnea; Stomach ulcer; Syncope; and Visual loss, one eye.   has a past surgical history that includes Pacemaker insertion; Gastric bypass surgery; chuy and bso (cervix removed); hernia repair; Cholecystectomy; Splenectomy; eye surgery; Colonoscopy (2010); Upper gastrointestinal endoscopy (2011); Appendectomy; Tonsillectomy and adenoidectomy; Cardiac catheterization (10/21/13  [facility]);  Hysterectomy; Incontinence surgery; Hysterectomy; Upper gastrointestinal endoscopy (2/2014); Upper gastrointestinal endoscopy (2/2010); Upper gastrointestinal endoscopy (7/2006); Colonoscopy (2/22/10); Upper gastrointestinal endoscopy (8/10/15); Colonoscopy (4/1/16); Upper gastrointestinal endoscopy (4/1/16); Upper gastrointestinal endoscopy (N/A, 4/1/2016); other surgical history; Small intestine surgery; pacemaker placement; Vena Cava Filter Placement (Right, 2003); Eye surgery; Gastric bypass surgery; Small intestine surgery; and pr total knee arthroplasty (Right, 3/26/2018).     Treatment Diagnosis: LLE DVT      Restrictions       Subjective   General  Chart Reviewed: Yes  Patient assessed for rehabilitation services?: Yes  Family / Caregiver Present: No  Diagnosis: LLE DVT  General Comment  Comments: Pt. has been up to bathroom and giving herself a sponge bath  Pain Assessment  Patient Currently in Pain: Yes  Pain Assessment: 0-10  Pain Level: 8  Pain Type: Acute pain  Pain Location: Leg  Pain Orientation: Left  Pain Descriptors: Aching  Response to Pain Intervention: Asleep with RR greater than 10     Social/Functional History  Social/Functional History  Lives With: Family (Daughter and son in law)  Type of Home: House  Home Layout: One level  Home Access: Stairs to enter with rails  Bathroom Shower/Tub: Tub/Shower unit  Bathroom Toilet: Standard  Home Equipment: Rolling walker (Does not use A.D. at home)  ADL Assistance: Independent  Ambulation Assistance: Independent  Transfer Assistance: Independent  Additional Comments: Pt. still working and Independent with self care       Objective   Vision: Within Functional Limits  Hearing: Within functional limits    Orientation  Overall Orientation Status: Within Normal Limits     Standing Balance  Sit to stand: Supervision  ADL  Feeding: Independent  Grooming: Independent  UE Bathing: Supervision  LE Bathing: Supervision  UE Dressing: Supervision  LE Dressing: Supervision  Toileting: Supervision        Bed mobility  Supine to Sit: Modified independent  Sit to Supine: Modified independent  Transfers  Stand Step Transfers: Supervision  Sit to stand: Supervision  Transfer Comments: No A.D. Cognition  Overall Cognitive Status: WNL                 LUE AROM (degrees)  LUE AROM : WFL  RUE AROM (degrees)  RUE AROM : WFL  LUE Strength  Gross LUE Strength: WFL  RUE Strength  Gross RUE Strength: WFL                  Assessment   Assessment: Pt. doing well with toilet tfers and basic ADLS. PT to see but OT eval only  Treatment Diagnosis: LLE DVT  Prognosis: Good  Decision Making: Low Complexity  No Skilled OT: No OT goals identified; Safe to return home  REQUIRES OT FOLLOW UP: No  Activity Tolerance  Activity Tolerance: Patient Tolerated treatment well         Plan   Plan  Times per week: OT eval only    G-Code  OT G-codes  Functional Assessment Tool Used: ADLs, transfers  Score: CI  Functional Limitation: Self care  Self Care Current Status (): At least 1 percent but less than 20 percent impaired, limited or restricted  Self Care Goal Status (): At least 1 percent but less than 20 percent impaired, limited or restricted  Self Care Discharge Status ():  At least 1 percent but less than 20 percent impaired, limited or restricted  OutComes Score                                           AM-PAC Score             Goals  Short term goals  Time Frame for Short term goals: OT eval only  Long term goals  Time Frame for Long term goals : OT eval only       Therapy Time   Individual Concurrent Group Co-treatment   Time In           Time Out           Minutes                   FRANK Cerda/JOHANN

## 2018-07-30 NOTE — PROGRESS NOTES
Robbin Hardy is a 70 y.o. female patient.     Current Facility-Administered Medications   Medication Dose Route Frequency Provider Last Rate Last Dose    warfarin (COUMADIN) tablet 12.5 mg  12.5 mg Oral Once Tanya Geronimo MD        HYDROmorphone (DILAUDID) tablet 2 mg  2 mg Oral Q3H PRN Penny Castillo MD   2 mg at 07/28/18 2000    gabapentin (NEURONTIN) capsule 300 mg  300 mg Oral BID Penny Castillo MD   300 mg at 07/30/18 0925    ondansetron (ZOFRAN) injection 4 mg  4 mg Intravenous Q6H PRN Tanya Geronimo MD        sodium chloride flush 0.9 % injection 10 mL  10 mL Intravenous 2 times per day Tanya Geronimo MD   10 mL at 07/30/18 0926    sodium chloride flush 0.9 % injection 10 mL  10 mL Intravenous PRN Tanya Geronimo MD        acetaminophen (TYLENOL) tablet 650 mg  650 mg Oral Q8H PRN Judson Caal MD   650 mg at 07/26/18 1018    heparin (porcine) injection 7,980 Units  80 Units/kg Intravenous PRN Tanya Geronimo MD        heparin (porcine) injection 3,990 Units  40 Units/kg Intravenous PRN Tanya Geronimo MD   3,990 Units at 07/26/18 1132    heparin 25,000 units in dextrose 5% 250 mL infusion  18 Units/kg/hr Intravenous Continuous Judson Caal MD 2 mL/hr at 07/30/18 0656 2.004 Units/kg/hr at 07/30/18 0656    hydrALAZINE (APRESOLINE) injection 10 mg  10 mg Intravenous Q6H PRN Judson Caal MD   10 mg at 07/25/18 1000    escitalopram (LEXAPRO) tablet 10 mg  10 mg Oral Daily Judson Caal MD   10 mg at 07/30/18 0925    levothyroxine (SYNTHROID) tablet 100 mcg  100 mcg Oral Daily Judson Caal MD   100 mcg at 07/30/18 0616    lisinopril (PRINIVIL;ZESTRIL) tablet 20 mg  20 mg Oral Daily Judson Caal MD   20 mg at 07/30/18 0925    pantoprazole (PROTONIX) tablet 40 mg  40 mg Oral BID Judson Caal MD   40 mg at 07/30/18 0925    rOPINIRole (REQUIP) tablet 1 mg  1 mg Oral TID Tanya Geronimo MD   1 mg at 07/30/18 6389    warfarin (COUMADIN) daily dosing (placeholder)

## 2018-07-31 LAB
ANION GAP SERPL CALCULATED.3IONS-SCNC: 10 MMOL/L (ref 7–19)
APTT: 59.7 SEC (ref 26–36.2)
APTT: 61.9 SEC (ref 26–36.2)
APTT: 66.7 SEC (ref 26–36.2)
APTT: 67.9 SEC (ref 26–36.2)
BASOPHILS ABSOLUTE: 0 K/UL (ref 0–0.2)
BASOPHILS RELATIVE PERCENT: 0.3 % (ref 0–1)
BUN BLDV-MCNC: 14 MG/DL (ref 8–23)
CALCIUM SERPL-MCNC: 8.5 MG/DL (ref 8.8–10.2)
CHLORIDE BLD-SCNC: 102 MMOL/L (ref 98–111)
CO2: 22 MMOL/L (ref 22–29)
CREAT SERPL-MCNC: 1 MG/DL (ref 0.5–0.9)
EOSINOPHILS ABSOLUTE: 0.3 K/UL (ref 0–0.6)
EOSINOPHILS RELATIVE PERCENT: 3.1 % (ref 0–5)
GFR NON-AFRICAN AMERICAN: 55
GLUCOSE BLD-MCNC: 90 MG/DL (ref 74–109)
HCT VFR BLD CALC: 27.9 % (ref 37–47)
HEMOGLOBIN: 8.9 G/DL (ref 12–16)
INR BLD: 2.01 (ref 0.88–1.18)
LYMPHOCYTES ABSOLUTE: 1.9 K/UL (ref 1.1–4.5)
LYMPHOCYTES RELATIVE PERCENT: 21.8 % (ref 20–40)
MCH RBC QN AUTO: 27.1 PG (ref 27–31)
MCHC RBC AUTO-ENTMCNC: 31.9 G/DL (ref 33–37)
MCV RBC AUTO: 85.1 FL (ref 81–99)
MONOCYTES ABSOLUTE: 1.1 K/UL (ref 0–0.9)
MONOCYTES RELATIVE PERCENT: 12.6 % (ref 0–10)
NEUTROPHILS ABSOLUTE: 5.4 K/UL (ref 1.5–7.5)
NEUTROPHILS RELATIVE PERCENT: 60.5 % (ref 50–65)
PDW BLD-RTO: 15.9 % (ref 11.5–14.5)
PLATELET # BLD: 245 K/UL (ref 130–400)
PMV BLD AUTO: 12.3 FL (ref 9.4–12.3)
POTASSIUM SERPL-SCNC: 4.4 MMOL/L (ref 3.5–5)
PROTHROMBIN TIME: 22.8 SEC (ref 12–14.6)
RBC # BLD: 3.28 M/UL (ref 4.2–5.4)
SODIUM BLD-SCNC: 134 MMOL/L (ref 136–145)
WBC # BLD: 8.8 K/UL (ref 4.8–10.8)

## 2018-07-31 PROCEDURE — 6370000000 HC RX 637 (ALT 250 FOR IP): Performed by: FAMILY MEDICINE

## 2018-07-31 PROCEDURE — 85730 THROMBOPLASTIN TIME PARTIAL: CPT

## 2018-07-31 PROCEDURE — 85610 PROTHROMBIN TIME: CPT

## 2018-07-31 PROCEDURE — 85025 COMPLETE CBC W/AUTO DIFF WBC: CPT

## 2018-07-31 PROCEDURE — 6370000000 HC RX 637 (ALT 250 FOR IP): Performed by: INTERNAL MEDICINE

## 2018-07-31 PROCEDURE — 2580000003 HC RX 258: Performed by: FAMILY MEDICINE

## 2018-07-31 PROCEDURE — 99232 SBSQ HOSP IP/OBS MODERATE 35: CPT | Performed by: INTERNAL MEDICINE

## 2018-07-31 PROCEDURE — 1210000000 HC MED SURG R&B

## 2018-07-31 PROCEDURE — 2580000003 HC RX 258: Performed by: INTERNAL MEDICINE

## 2018-07-31 PROCEDURE — 80048 BASIC METABOLIC PNL TOTAL CA: CPT

## 2018-07-31 PROCEDURE — 36415 COLL VENOUS BLD VENIPUNCTURE: CPT

## 2018-07-31 PROCEDURE — 6360000002 HC RX W HCPCS: Performed by: INTERNAL MEDICINE

## 2018-07-31 RX ADMIN — HEPARIN SODIUM AND DEXTROSE 2 UNITS/KG/HR: 10000; 5 INJECTION INTRAVENOUS at 14:31

## 2018-07-31 RX ADMIN — Medication 10 ML: at 21:31

## 2018-07-31 RX ADMIN — PANTOPRAZOLE SODIUM 40 MG: 40 TABLET, DELAYED RELEASE ORAL at 21:31

## 2018-07-31 RX ADMIN — ROPINIROLE HYDROCHLORIDE 1 MG: 1 TABLET, FILM COATED ORAL at 21:31

## 2018-07-31 RX ADMIN — Medication 10 ML: at 21:32

## 2018-07-31 RX ADMIN — WARFARIN SODIUM 7.5 MG: 2.5 TABLET ORAL at 18:19

## 2018-07-31 RX ADMIN — GABAPENTIN 300 MG: 300 CAPSULE ORAL at 21:31

## 2018-07-31 RX ADMIN — LISINOPRIL 20 MG: 20 TABLET ORAL at 08:13

## 2018-07-31 RX ADMIN — ROPINIROLE HYDROCHLORIDE 1 MG: 1 TABLET, FILM COATED ORAL at 14:31

## 2018-07-31 RX ADMIN — PANTOPRAZOLE SODIUM 40 MG: 40 TABLET, DELAYED RELEASE ORAL at 08:13

## 2018-07-31 RX ADMIN — GABAPENTIN 300 MG: 300 CAPSULE ORAL at 08:13

## 2018-07-31 RX ADMIN — ESCITALOPRAM OXALATE 10 MG: 10 TABLET ORAL at 08:13

## 2018-07-31 RX ADMIN — LEVOTHYROXINE SODIUM 100 MCG: 100 TABLET ORAL at 05:48

## 2018-07-31 RX ADMIN — ROPINIROLE HYDROCHLORIDE 1 MG: 1 TABLET, FILM COATED ORAL at 08:13

## 2018-07-31 ASSESSMENT — PAIN SCALES - GENERAL: PAINLEVEL_OUTOF10: 0

## 2018-07-31 NOTE — PROGRESS NOTES
DVT / history of pulmonary embolism: As per hematology. Lupus anticoagulant test negative. INR Is 2.0 today. Plan to stop heparin drip with hematology feels appropriate.     Hypertension: Controlled.  Follow.     Systemic lupus erythematosus     Sarcoidosis     Obstructive sleep apnea  Chester Wilson MD  7/31/2018

## 2018-07-31 NOTE — PLAN OF CARE
Problem: Safety:  Goal: Free from accidental physical injury  Free from accidental physical injury   Outcome: Ongoing

## 2018-07-31 NOTE — PROGRESS NOTES
Clinical Pharmacy Note    Warfarin consult follow-up    Recent Labs      07/31/18   0553   INR  2.01*     Recent Labs      07/29/18   0356  07/30/18   0000  07/31/18   0012   HGB  8.9*  9.0*  8.9*   HCT  28.8*  27.8*  27.9*   PLT  212  225  245       Significant Drug-Drug Interactions:  New warfarin drug-drug interactions: none  Discontinued drug-drug interactions: none    Date INR Warfarin Dose   07/24/18 1.27 15mg per order PCP  After DVT suspected  Had been off Warfarin approx 1 week   07/25/18 1.37 10mg     07/26/18 1.29  10 mg     07/27/18 1.31  12.5 mg     07/28/18 1.44   12.5 mg   07/29/18  1.58  15 mg    07/30/18 1.77                    12.5 mg    07/31/18  2.01                      7.5 mg                     Notes:  Coumadin 7.5mg po X1 tonight. Daily PT/INR until stable within therapeutic range.      Electronically signed by Verenice Han [facility] on 7/31/2018 at 12:12 PM

## 2018-08-01 LAB
ANION GAP SERPL CALCULATED.3IONS-SCNC: 12 MMOL/L (ref 7–19)
APTT: 64.1 SEC (ref 26–36.2)
APTT: 64.8 SEC (ref 26–36.2)
APTT: 68.1 SEC (ref 26–36.2)
APTT: 70.6 SEC (ref 26–36.2)
BASOPHILS ABSOLUTE: 0 K/UL (ref 0–0.2)
BASOPHILS RELATIVE PERCENT: 0.5 % (ref 0–1)
BUN BLDV-MCNC: 14 MG/DL (ref 8–23)
CALCIUM SERPL-MCNC: 8.4 MG/DL (ref 8.8–10.2)
CHLORIDE BLD-SCNC: 106 MMOL/L (ref 98–111)
CO2: 20 MMOL/L (ref 22–29)
CREAT SERPL-MCNC: 1.2 MG/DL (ref 0.5–0.9)
EOSINOPHILS ABSOLUTE: 0.2 K/UL (ref 0–0.6)
EOSINOPHILS RELATIVE PERCENT: 2.7 % (ref 0–5)
GFR NON-AFRICAN AMERICAN: 45
GLUCOSE BLD-MCNC: 88 MG/DL (ref 74–109)
HCT VFR BLD CALC: 27.9 % (ref 37–47)
HEMOGLOBIN: 8.8 G/DL (ref 12–16)
INR BLD: 2.16 (ref 0.88–1.18)
LYMPHOCYTES ABSOLUTE: 1.7 K/UL (ref 1.1–4.5)
LYMPHOCYTES RELATIVE PERCENT: 20 % (ref 20–40)
MCH RBC QN AUTO: 27.1 PG (ref 27–31)
MCHC RBC AUTO-ENTMCNC: 31.5 G/DL (ref 33–37)
MCV RBC AUTO: 85.8 FL (ref 81–99)
MONOCYTES ABSOLUTE: 1.1 K/UL (ref 0–0.9)
MONOCYTES RELATIVE PERCENT: 12.3 % (ref 0–10)
NEUTROPHILS ABSOLUTE: 5.4 K/UL (ref 1.5–7.5)
NEUTROPHILS RELATIVE PERCENT: 62.5 % (ref 50–65)
PDW BLD-RTO: 16.3 % (ref 11.5–14.5)
PLATELET # BLD: 249 K/UL (ref 130–400)
PMV BLD AUTO: 13 FL (ref 9.4–12.3)
POTASSIUM SERPL-SCNC: 4.7 MMOL/L (ref 3.5–5)
PROTHROMBIN TIME: 24.1 SEC (ref 12–14.6)
RBC # BLD: 3.25 M/UL (ref 4.2–5.4)
SODIUM BLD-SCNC: 138 MMOL/L (ref 136–145)
WBC # BLD: 8.6 K/UL (ref 4.8–10.8)

## 2018-08-01 PROCEDURE — 6370000000 HC RX 637 (ALT 250 FOR IP): Performed by: INTERNAL MEDICINE

## 2018-08-01 PROCEDURE — 85025 COMPLETE CBC W/AUTO DIFF WBC: CPT

## 2018-08-01 PROCEDURE — 36415 COLL VENOUS BLD VENIPUNCTURE: CPT

## 2018-08-01 PROCEDURE — 85610 PROTHROMBIN TIME: CPT

## 2018-08-01 PROCEDURE — 1210000000 HC MED SURG R&B

## 2018-08-01 PROCEDURE — 85730 THROMBOPLASTIN TIME PARTIAL: CPT

## 2018-08-01 PROCEDURE — 80048 BASIC METABOLIC PNL TOTAL CA: CPT

## 2018-08-01 PROCEDURE — 99232 SBSQ HOSP IP/OBS MODERATE 35: CPT | Performed by: INTERNAL MEDICINE

## 2018-08-01 PROCEDURE — 6370000000 HC RX 637 (ALT 250 FOR IP): Performed by: FAMILY MEDICINE

## 2018-08-01 RX ORDER — WARFARIN SODIUM 5 MG/1
10 TABLET ORAL
Status: COMPLETED | OUTPATIENT
Start: 2018-08-01 | End: 2018-08-01

## 2018-08-01 RX ADMIN — LISINOPRIL 20 MG: 20 TABLET ORAL at 09:08

## 2018-08-01 RX ADMIN — ROPINIROLE HYDROCHLORIDE 1 MG: 1 TABLET, FILM COATED ORAL at 20:36

## 2018-08-01 RX ADMIN — PANTOPRAZOLE SODIUM 40 MG: 40 TABLET, DELAYED RELEASE ORAL at 20:36

## 2018-08-01 RX ADMIN — GABAPENTIN 300 MG: 300 CAPSULE ORAL at 09:08

## 2018-08-01 RX ADMIN — PANTOPRAZOLE SODIUM 40 MG: 40 TABLET, DELAYED RELEASE ORAL at 09:09

## 2018-08-01 RX ADMIN — ACETAMINOPHEN 650 MG: 325 TABLET, FILM COATED ORAL at 20:38

## 2018-08-01 RX ADMIN — GABAPENTIN 300 MG: 300 CAPSULE ORAL at 20:36

## 2018-08-01 RX ADMIN — ROPINIROLE HYDROCHLORIDE 1 MG: 1 TABLET, FILM COATED ORAL at 09:08

## 2018-08-01 RX ADMIN — ESCITALOPRAM OXALATE 10 MG: 10 TABLET ORAL at 09:08

## 2018-08-01 RX ADMIN — LEVOTHYROXINE SODIUM 100 MCG: 100 TABLET ORAL at 06:20

## 2018-08-01 RX ADMIN — WARFARIN SODIUM 10 MG: 5 TABLET ORAL at 17:43

## 2018-08-01 RX ADMIN — ROPINIROLE HYDROCHLORIDE 1 MG: 1 TABLET, FILM COATED ORAL at 14:32

## 2018-08-01 ASSESSMENT — PAIN SCALES - GENERAL
PAINLEVEL_OUTOF10: 0
PAINLEVEL_OUTOF10: 8
PAINLEVEL_OUTOF10: 0
PAINLEVEL_OUTOF10: 3

## 2018-08-01 NOTE — PROGRESS NOTES
Clinical Pharmacy Note    Warfarin consult follow-up    Recent Labs      08/01/18   0550   INR  2.16*     Recent Labs      07/30/18   0000  07/31/18   0012  08/01/18   0126   HGB  9.0*  8.9*  8.8*   HCT  27.8*  27.9*  27.9*   PLT  225  245  249       Significant warfarin drug-drug interactions: Synthroid    Date INR Warfarin Dose   07/24/18 1.27 15mg per order PCP  After DVT suspected  Had been off Warfarin approx 1 week   07/25/18 1.37 10mg     07/26/18 1.29  10 mg     07/27/18 1.31  12.5 mg     07/28/18 1.44   12.5 mg   07/29/18  1.58  15 mg    07/30/18 1.77                    12.5 mg    07/31/18  2.01                      7.5 mg    08/01/18 2.16                       10 mg                Notes:                   Give Coumadin 10mg x 1 dose tonight. Daily PT/INR until stable within therapeutic range.      ALEIDA CARNEY PHARM D, 8/1/2018, 1:13 PM

## 2018-08-01 NOTE — PROGRESS NOTES
Clinical Pharmacy Note    Warfarin consult follow-up    Recent Labs      08/01/18   0550   INR  2.16*     Recent Labs      07/30/18   0000  07/31/18   0012  08/01/18   0126   HGB  9.0*  8.9*  8.8*   HCT  27.8*  27.9*  27.9*   PLT  225  245  249       Significant warfarin drug-drug interactions: Synthroid    Date INR Warfarin Dose   07/24/18 1.27 15mg per order PCP  After DVT suspected  Had been off Warfarin approx 1 week   07/25/18 1.37 10mg     07/26/18 1.29  10 mg     07/27/18 1.31  12.5 mg     07/28/18 1.44   12.5 mg   07/29/18  1.58  15 mg    07/30/18 1.77                    12.5 mg    07/31/18  2.01                      7.5 mg    08/01/18 2.16                       10 mg                Notes:                   Give Coumadin 7.5mg x 1 dose tonight. Daily PT/INR until stable within therapeutic range.      ALEIDA CARNEY, PHARM D, 8/1/2018, 1:13 PM

## 2018-08-01 NOTE — PROGRESS NOTES
injection 10 mL  10 mL Intravenous 2 times per day Hazel Salvador MD   10 mL at 07/31/18 2131    sodium chloride flush 0.9 % injection 10 mL  10 mL Intravenous PRN Hazel Salvador MD   10 mL at 07/28/18 2000     Allergies   Allergen Reactions    Insect Extract Allergy Skin Test Anaphylaxis     Bee stings    Prednisone      Headache and upset stomach    Zanaflex [Tizanidine Hcl]      Passed out lost control of body functions    Lortab [Hydrocodone-Acetaminophen] Nausea And Vomiting     Sweats, weak, nausea and vomiting    Other Nausea And Vomiting     Opiates------sweating, weak        Oxycodone-Acetaminophen Nausea And Vomiting     Sweating and vomiting     Ultram [Tramadol Hcl] Nausea And Vomiting     Sweating, weak, nausea and vomiting       Principal Problem:    DVT, lower extremity, proximal, acute, unspecified laterality (HCC)  Active Problems:    Stable angina (HCC)    GERD (gastroesophageal reflux disease)    History of gastric bypass    Essential hypertension    Restrictive airway disease  Resolved Problems:    * No resolved hospital problems. *    Blood pressure 132/71, pulse 66, temperature 98.2 °F (36.8 °C), resp. rate 18, height 5' 7\" (1.702 m), weight 227 lb 6 oz (103.1 kg), SpO2 97 %, not currently breastfeeding. Subjective   No complaints    Review of systems:  Gen.: No fever or chills  Cardiovascular: No chest pain or palpitations  Pulmonary: No shortness of breath or productive coughing  Gastrointestinal: No abdominal pain, nausea, vomiting or diarrhea  Neurologic: No focal numbness or weakness    Objective     General: in no distress  Pulmonary: Lungs clear, unlabored breathing  Cardiovascular: Heart regular without murmur, 1+ edema left lower extremity, no edema right lower extremity  Gastrointestinal: Abdomen soft, nontender, no guarding or masses  Neurologic: Alert, no focal motor deficits  Skin: Warm and dry. No rash.       Assessment & Plan    Left lower extremity DVT / history

## 2018-08-02 VITALS
SYSTOLIC BLOOD PRESSURE: 135 MMHG | HEART RATE: 60 BPM | RESPIRATION RATE: 18 BRPM | BODY MASS INDEX: 35.35 KG/M2 | OXYGEN SATURATION: 96 % | DIASTOLIC BLOOD PRESSURE: 75 MMHG | HEIGHT: 67 IN | TEMPERATURE: 98.3 F | WEIGHT: 225.25 LBS

## 2018-08-02 PROBLEM — M32.9 H/O SYSTEMIC LUPUS ERYTHEMATOSUS (SLE) (HCC): Status: ACTIVE | Noted: 2018-08-02

## 2018-08-02 LAB
ANION GAP SERPL CALCULATED.3IONS-SCNC: 11 MMOL/L (ref 7–19)
APTT: 51.5 SEC (ref 26–36.2)
APTT: 64.1 SEC (ref 26–36.2)
BASOPHILS ABSOLUTE: 0.1 K/UL (ref 0–0.2)
BASOPHILS RELATIVE PERCENT: 0.5 % (ref 0–1)
BUN BLDV-MCNC: 15 MG/DL (ref 8–23)
CALCIUM SERPL-MCNC: 8.3 MG/DL (ref 8.8–10.2)
CHLORIDE BLD-SCNC: 103 MMOL/L (ref 98–111)
CO2: 23 MMOL/L (ref 22–29)
CREAT SERPL-MCNC: 1 MG/DL (ref 0.5–0.9)
EOSINOPHILS ABSOLUTE: 0.3 K/UL (ref 0–0.6)
EOSINOPHILS RELATIVE PERCENT: 2.7 % (ref 0–5)
GFR NON-AFRICAN AMERICAN: 55
GLUCOSE BLD-MCNC: 87 MG/DL (ref 74–109)
HCT VFR BLD CALC: 27.7 % (ref 37–47)
HEMOGLOBIN: 8.6 G/DL (ref 12–16)
INR BLD: 2.17 (ref 0.88–1.18)
LYMPHOCYTES ABSOLUTE: 2.2 K/UL (ref 1.1–4.5)
LYMPHOCYTES RELATIVE PERCENT: 23.2 % (ref 20–40)
MCH RBC QN AUTO: 27.5 PG (ref 27–31)
MCHC RBC AUTO-ENTMCNC: 31 G/DL (ref 33–37)
MCV RBC AUTO: 88.5 FL (ref 81–99)
MONOCYTES ABSOLUTE: 1 K/UL (ref 0–0.9)
MONOCYTES RELATIVE PERCENT: 11 % (ref 0–10)
NEUTROPHILS ABSOLUTE: 5.7 K/UL (ref 1.5–7.5)
NEUTROPHILS RELATIVE PERCENT: 60.4 % (ref 50–65)
PDW BLD-RTO: 16.6 % (ref 11.5–14.5)
PLATELET # BLD: 252 K/UL (ref 130–400)
PMV BLD AUTO: 12.6 FL (ref 9.4–12.3)
POTASSIUM SERPL-SCNC: 4.5 MMOL/L (ref 3.5–5)
PROTHROMBIN TIME: 24.2 SEC (ref 12–14.6)
RBC # BLD: 3.13 M/UL (ref 4.2–5.4)
SODIUM BLD-SCNC: 137 MMOL/L (ref 136–145)
WBC # BLD: 9.4 K/UL (ref 4.8–10.8)

## 2018-08-02 PROCEDURE — 99238 HOSP IP/OBS DSCHRG MGMT 30/<: CPT | Performed by: INTERNAL MEDICINE

## 2018-08-02 PROCEDURE — 85025 COMPLETE CBC W/AUTO DIFF WBC: CPT

## 2018-08-02 PROCEDURE — 80048 BASIC METABOLIC PNL TOTAL CA: CPT

## 2018-08-02 PROCEDURE — 6370000000 HC RX 637 (ALT 250 FOR IP): Performed by: NURSE PRACTITIONER

## 2018-08-02 PROCEDURE — APPSS30 APP SPLIT SHARED TIME 16-30 MINUTES: Performed by: PHYSICIAN ASSISTANT

## 2018-08-02 PROCEDURE — 6370000000 HC RX 637 (ALT 250 FOR IP): Performed by: INTERNAL MEDICINE

## 2018-08-02 PROCEDURE — 85610 PROTHROMBIN TIME: CPT

## 2018-08-02 PROCEDURE — 85730 THROMBOPLASTIN TIME PARTIAL: CPT

## 2018-08-02 PROCEDURE — 2580000003 HC RX 258: Performed by: INTERNAL MEDICINE

## 2018-08-02 PROCEDURE — 36415 COLL VENOUS BLD VENIPUNCTURE: CPT

## 2018-08-02 PROCEDURE — 2580000003 HC RX 258: Performed by: FAMILY MEDICINE

## 2018-08-02 PROCEDURE — 6370000000 HC RX 637 (ALT 250 FOR IP): Performed by: FAMILY MEDICINE

## 2018-08-02 RX ORDER — FERROUS SULFATE 325(65) MG
325 TABLET ORAL 2 TIMES DAILY WITH MEALS
Qty: 30 TABLET | Refills: 0 | COMMUNITY
Start: 2018-08-02 | End: 2018-08-07

## 2018-08-02 RX ORDER — FERROUS SULFATE 325(65) MG
325 TABLET ORAL 2 TIMES DAILY WITH MEALS
Status: DISCONTINUED | OUTPATIENT
Start: 2018-08-02 | End: 2018-08-02 | Stop reason: HOSPADM

## 2018-08-02 RX ADMIN — LISINOPRIL 20 MG: 20 TABLET ORAL at 09:23

## 2018-08-02 RX ADMIN — Medication 10 ML: at 09:23

## 2018-08-02 RX ADMIN — ROPINIROLE HYDROCHLORIDE 1 MG: 1 TABLET, FILM COATED ORAL at 09:24

## 2018-08-02 RX ADMIN — GABAPENTIN 300 MG: 300 CAPSULE ORAL at 09:24

## 2018-08-02 RX ADMIN — Medication 10 ML: at 09:24

## 2018-08-02 RX ADMIN — FERROUS SULFATE TAB 325 MG (65 MG ELEMENTAL FE) 325 MG: 325 (65 FE) TAB at 09:23

## 2018-08-02 RX ADMIN — PANTOPRAZOLE SODIUM 40 MG: 40 TABLET, DELAYED RELEASE ORAL at 09:23

## 2018-08-02 RX ADMIN — ESCITALOPRAM OXALATE 10 MG: 10 TABLET ORAL at 09:24

## 2018-08-02 RX ADMIN — LEVOTHYROXINE SODIUM 100 MCG: 100 TABLET ORAL at 06:06

## 2018-08-02 ASSESSMENT — PAIN SCALES - GENERAL
PAINLEVEL_OUTOF10: 0
PAINLEVEL_OUTOF10: 0

## 2018-08-02 NOTE — DISCHARGE SUMMARY
[facility] Discharge Summary    Jr Bull  :  1950  MRN:  [id number]    Admit date:  2018  Discharge date:  18    Admitting Physician:  Milagro Galloway MD    Primary Care Physician:  Tanvi Alcala MD    Consultants:  Dr. Nestor Girard, Vascular Surgery; Dr. Phu Ko, Hematology Oncology    Discharge Diagnoses:  Principal Problem:    DVT, lower extremity, proximal, acute, unspecified laterality ([address]  Active Problems:    Stable angina ([address]    GERD (gastroesophageal reflux disease)    History of gastric bypass    Essential hypertension    Restrictive airway disease    H/O systemic lupus erythematosus (SLE)  Resolved Problems:    * No resolved hospital problems. [facility] [place] [facility] Course: The patient is a pleasant 70year old female who presents to the hospital with complaints of LLE swelling and pain for one week. She had been undergoing workup in the outpatient setting and was found to have a large DVT. She has a history of systemic lupus erythematosus, previous bilateral pulmonary emboli, and DVT on chronic oral anticoagulation. The patient had difficulty obtaining her medication in the past and was not compliant with warfarin treatment due to this reason. She is admitted to the hospital, placed on a heparin drip, and consults placed to vascular surgery and hematology oncology. The patient was seen by Dr. Nestor Girard who recommended continuing treatment with warfarin. She has a history of IVC filter placement. He did not recommend the use of DOAC in a lupus patient due to a higher incidence of failure. Dr. Phu Ko also recommended continuing long-term anticoagulation with warfarin.  was consulted to assist with medication. Good rx card was provided to the patient to obtain a discount. The patient reports being able to obtain warfarin now. She has an INR machine at home to monitor her INR levels. She has been continued on a heparin drip.   INR has levothyroxine (SYNTHROID) 100 MCG tablet  Take by mouth             lisinopril (PRINIVIL;ZESTRIL) 20 MG tablet  Take 1 tablet by mouth daily             Multiple Vitamin (MULTIVITAMIN PO)  Take 1 tablet by mouth daily              ondansetron (ZOFRAN) 4 MG tablet  Take 1 tablet by mouth every 8 hours as needed for Nausea or Vomiting             pantoprazole (PROTONIX) 40 MG tablet  Take 1 tablet by mouth 2 times daily             potassium chloride (KLOR-CON M) 10 MEQ extended release tablet  Take 1 tablet by mouth daily             ranitidine (ZANTAC) 150 MG tablet               rOPINIRole (REQUIP) 1 MG tablet  Take 1 mg by mouth 3 times daily             sucralfate (CARAFATE) 1 GM/10ML suspension  Take 10 mLs by mouth 4 times daily             warfarin (COUMADIN) 7.5 MG tablet  Take 7.5 mg by mouth daily                 Disposition:  Home    Diet:  Regular Diet    Follow Up Instructions: Follow up with Carmen Talbot MD in 2-3 days. Follow up with CHANELLE Merida, Hematology Oncology, on 8/15/18. Time spent on discharge: 25 minutes. Signed:  Pancho Weiner PA-C  8/2/2018 8/2/18   10:30 AM    I have seen and examined the patient today. Vital signs reviewed  General: in no distress  Pulmonary: Lungs clear, unlabored breathing  Cardiovascular: Heart regular without murmur, 1+ edema left lower extremity, no edema right lower extremity  Gastrointestinal: Abdomen soft, nontender, no guarding or masses  Neurologic: Alert, no focal motor deficits  Skin: Warm and dry. No rash. I agree with the above note including the assessment and plan. patient is to have follow-up INR tomorrow with Dr. Duque Knows managing her Coumadin. I have encouraged patient to avoid prolonged standing or sitting with her feet down for the next several weeks to avoid increased edema in the left leg.

## 2018-08-02 NOTE — DISCHARGE INSTR - DIET

## 2018-08-03 ENCOUNTER — CARE COORDINATION (OUTPATIENT)
Dept: CASE MANAGEMENT | Age: 68
End: 2018-08-03

## 2018-08-03 DIAGNOSIS — I82.4Y9 DVT, LOWER EXTREMITY, PROXIMAL, ACUTE, UNSPECIFIED LATERALITY (HCC): Primary | ICD-10-CM

## 2018-08-03 PROCEDURE — 1111F DSCHRG MED/CURRENT MED MERGE: CPT | Performed by: INTERNAL MEDICINE

## 2018-08-06 ENCOUNTER — TELEPHONE (OUTPATIENT)
Dept: INTERNAL MEDICINE | Age: 68
End: 2018-08-06

## 2018-08-06 ENCOUNTER — OFFICE VISIT (OUTPATIENT)
Dept: INTERNAL MEDICINE | Age: 68
End: 2018-08-06
Payer: MEDICARE

## 2018-08-06 VITALS
HEIGHT: 67 IN | DIASTOLIC BLOOD PRESSURE: 84 MMHG | HEART RATE: 77 BPM | WEIGHT: 217 LBS | SYSTOLIC BLOOD PRESSURE: 152 MMHG | BODY MASS INDEX: 34.06 KG/M2 | OXYGEN SATURATION: 97 % | RESPIRATION RATE: 16 BRPM

## 2018-08-06 DIAGNOSIS — I82.492 ACUTE DEEP VEIN THROMBOSIS (DVT) OF OTHER SPECIFIED VEIN OF LEFT LOWER EXTREMITY (HCC): Primary | ICD-10-CM

## 2018-08-06 PROCEDURE — 1111F DSCHRG MED/CURRENT MED MERGE: CPT | Performed by: NURSE PRACTITIONER

## 2018-08-06 PROCEDURE — 99496 TRANSJ CARE MGMT HIGH F2F 7D: CPT | Performed by: NURSE PRACTITIONER

## 2018-08-06 RX ORDER — LANOLIN ALCOHOL/MO/W.PET/CERES
325 CREAM (GRAM) TOPICAL 2 TIMES DAILY
Qty: 180 TABLET | Refills: 2 | Status: SHIPPED | OUTPATIENT
Start: 2018-08-06 | End: 2018-08-07 | Stop reason: SDUPTHER

## 2018-08-06 NOTE — TELEPHONE ENCOUNTER
[facility] Initial Follow Up Call    Outreach made within 2 business days of discharge: No, as patient was discharged on Thursday, has apt today on     Patient: Candelaria Farmer Patient : 1950   MRN: [id number]  Reason for Admission: LLE swelling and pain, DVT  Admission Date: 18  Discharge Date: 18       Spoke with: MARLYN    Discharge department/facility: [facility]    TCM Interactive Patient Contact:  Was patient able to fill all prescriptions:NA  Was patient instructed to bring all medications to the follow-up visit: NA  Is patient taking all medications as directed in the discharge summary?  NA  Does patient understand their discharge instructions: NA  Does patient have questions or concerns that need addressed prior to 7-14 day follow up office visit: No TCM call was made due to patient having apt on 18    Scheduled appointment with PCP within 7-14 days    Follow Up  Future Appointments  Date Time Provider Jose Shea   2018 2:30 PM CHANELLE RetanaKY       Dwaine Murdock LPN

## 2018-08-06 NOTE — PROGRESS NOTES
Post-Discharge Transitional Care Management Services      Lawrence Nieto   YOB: 1950    Date of Office Visit:  8/6/2018  Date of Hospital Admission: 7/24/18  Date of Hospital Discharge: 8/2/18  Risk of hospital readmission (high >=14%.  Medium >=10%) :Readmission Risk Score: 29    Care management risk score Rising risk (score 2-5) and Complex Care (Scores >=6): 5     Non face to face  following discharge, date last encounter closed (first attempt may have been earlier): 8/6/2018  9:16 AM    Call initiated 2 business days of discharge: No    Patient Active Problem List   Diagnosis    Vomiting    Burping    GERD (gastroesophageal reflux disease)    History of gastric bypass    Family history of colon cancer    Bloating    Enuresis    Nausea and vomiting    Colon cancer screening    Stable angina ([address]    Encounter for current long-term use of anticoagulants    Primary osteoarthritis of right knee    Arthritis of knee    Essential hypertension    Hyperglycemia    MER (obstructive sleep apnea)    Slow transit constipation    Iron deficiency anemia    Restrictive airway disease    DVT, lower extremity, proximal, acute, unspecified laterality ([address]    H/O systemic lupus erythematosus (SLE)    Anticoagulated on Coumadin       Allergies   Allergen Reactions    Insect Extract Allergy Skin Test Anaphylaxis     Bee stings    Prednisone      Headache and upset stomach    Zanaflex [Tizanidine Hcl]      Passed out lost control of body functions    Lortab [Hydrocodone-Acetaminophen] Nausea And Vomiting     Sweats, weak, nausea and vomiting    Other Nausea And Vomiting     Opiates------sweating, weak        Oxycodone-Acetaminophen Nausea And Vomiting     Sweating and vomiting     Ultram [Tramadol Hcl] Nausea And Vomiting     Sweating, weak, nausea and vomiting         Medications listed as ordered at the time of discharge from [facility], [address] Medication Instructions SHAKIR: and ear canal normal bilaterally, nose without deformity, nasal mucosa and turbinates normal without polyps  Neck: supple and non-tender without mass, no thyromegaly or thyroid nodules, no cervical lymphadenopathy  Pulmonary/Chest: clear to auscultation bilaterally- no wheezes, rales or rhonchi, normal air movement, no respiratory distress  Cardiovascular: normal rate, regular rhythm, normal S1 and S2, no murmurs, rubs, clicks, or gallops, distal pulses intact, no carotid bruits  Abdomen: soft, non-tender, non-distended, normal bowel sounds, no masses or organomegaly  Extremities: no cyanosis, clubbing or edema  Musculoskeletal: normal range of motion, no joint swelling, deformity or tenderness  Neurologic: reflexes normal and symmetric, no cranial nerve deficit, gait, coordination and speech normal    Assessment/Plan: Jeferson Berger was seen today for follow-up from hospital and back pain. Diagnoses and all orders for this visit:    Acute deep vein thrombosis (DVT) of other specified vein of left lower extremity ([address]  -     US DOPPLER VENOUS LEG LEFT;  Future          Medical Decision Making: high complexity

## 2018-08-07 DIAGNOSIS — G62.9 NEUROPATHY: ICD-10-CM

## 2018-08-07 DIAGNOSIS — J98.4 RESTRICTIVE AIRWAY DISEASE: ICD-10-CM

## 2018-08-07 RX ORDER — PANTOPRAZOLE SODIUM 40 MG/1
40 TABLET, DELAYED RELEASE ORAL 2 TIMES DAILY
Qty: 180 TABLET | Refills: 3 | Status: SHIPPED | OUTPATIENT
Start: 2018-08-07 | End: 2018-08-23 | Stop reason: SDUPTHER

## 2018-08-07 RX ORDER — ERGOCALCIFEROL (VITAMIN D2) 1250 MCG
50000 CAPSULE ORAL WEEKLY
Qty: 12 CAPSULE | Refills: 0 | Status: SHIPPED | OUTPATIENT
Start: 2018-08-07 | End: 2018-08-23 | Stop reason: SDUPTHER

## 2018-08-07 RX ORDER — LISINOPRIL 20 MG/1
20 TABLET ORAL DAILY
Qty: 90 TABLET | Refills: 3 | Status: SHIPPED | OUTPATIENT
Start: 2018-08-07 | End: 2018-08-23 | Stop reason: SDUPTHER

## 2018-08-07 RX ORDER — ONDANSETRON 4 MG/1
4 TABLET, FILM COATED ORAL EVERY 8 HOURS PRN
Qty: 90 TABLET | Refills: 3 | Status: SHIPPED | OUTPATIENT
Start: 2018-08-07 | End: 2018-08-23 | Stop reason: SDUPTHER

## 2018-08-07 RX ORDER — ROPINIROLE 1 MG/1
1 TABLET, FILM COATED ORAL 3 TIMES DAILY
Qty: 270 TABLET | Refills: 3 | Status: SHIPPED | OUTPATIENT
Start: 2018-08-07 | End: 2018-08-23 | Stop reason: SDUPTHER

## 2018-08-07 RX ORDER — LEVOTHYROXINE SODIUM 0.1 MG/1
100 TABLET ORAL DAILY
Qty: 90 TABLET | Refills: 3 | Status: SHIPPED | OUTPATIENT
Start: 2018-08-07 | End: 2018-08-23 | Stop reason: SDUPTHER

## 2018-08-07 RX ORDER — BUMETANIDE 1 MG/1
1 TABLET ORAL DAILY
Qty: 90 TABLET | Refills: 3 | Status: SHIPPED | OUTPATIENT
Start: 2018-08-07 | End: 2018-08-23 | Stop reason: SDUPTHER

## 2018-08-07 RX ORDER — SUCRALFATE ORAL 1 G/10ML
1 SUSPENSION ORAL 4 TIMES DAILY
Qty: 1200 ML | Refills: 2 | Status: SHIPPED | OUTPATIENT
Start: 2018-08-07 | End: 2018-08-23 | Stop reason: SDUPTHER

## 2018-08-07 RX ORDER — RANITIDINE 150 MG/1
150 TABLET ORAL NIGHTLY
Qty: 90 TABLET | Refills: 3 | Status: SHIPPED | OUTPATIENT
Start: 2018-08-07 | End: 2018-08-23 | Stop reason: SDUPTHER

## 2018-08-07 RX ORDER — CALCIPOTRIENE 50 UG/G
CREAM TOPICAL
Qty: 1 TUBE | Refills: 3 | Status: SHIPPED | OUTPATIENT
Start: 2018-08-07 | End: 2018-08-23 | Stop reason: SDUPTHER

## 2018-08-07 RX ORDER — WARFARIN SODIUM 7.5 MG/1
7.5 TABLET ORAL DAILY
Qty: 90 TABLET | Refills: 3 | Status: SHIPPED | OUTPATIENT
Start: 2018-08-07 | End: 2018-08-23 | Stop reason: SDUPTHER

## 2018-08-07 RX ORDER — LANOLIN ALCOHOL/MO/W.PET/CERES
325 CREAM (GRAM) TOPICAL 2 TIMES DAILY
Qty: 180 TABLET | Refills: 2 | Status: SHIPPED | OUTPATIENT
Start: 2018-08-07 | End: 2018-08-23 | Stop reason: SDUPTHER

## 2018-08-07 RX ORDER — CLOBETASOL PROPIONATE 0.5 MG/G
CREAM TOPICAL
Qty: 1 TUBE | Refills: 1 | Status: SHIPPED | OUTPATIENT
Start: 2018-08-07 | End: 2018-08-23 | Stop reason: SDUPTHER

## 2018-08-07 RX ORDER — ALBUTEROL SULFATE 90 UG/1
2 AEROSOL, METERED RESPIRATORY (INHALATION) EVERY 6 HOURS PRN
Qty: 3 INHALER | Refills: 3 | Status: SHIPPED | OUTPATIENT
Start: 2018-08-07 | End: 2018-08-23 | Stop reason: SDUPTHER

## 2018-08-07 RX ORDER — POTASSIUM CHLORIDE 750 MG/1
10 TABLET, EXTENDED RELEASE ORAL DAILY
Qty: 30 TABLET | Refills: 3 | Status: SHIPPED | OUTPATIENT
Start: 2018-08-07 | End: 2018-08-20 | Stop reason: SDUPTHER

## 2018-08-07 RX ORDER — ESCITALOPRAM OXALATE 10 MG/1
10 TABLET ORAL DAILY
Qty: 90 TABLET | Refills: 3 | Status: SHIPPED | OUTPATIENT
Start: 2018-08-07 | End: 2018-08-23 | Stop reason: SDUPTHER

## 2018-08-17 ENCOUNTER — TELEPHONE (OUTPATIENT)
Dept: INTERNAL MEDICINE | Age: 68
End: 2018-08-17

## 2018-08-17 ENCOUNTER — HOSPITAL ENCOUNTER (EMERGENCY)
Age: 68
Discharge: HOME | End: 2018-08-17
Attending: EMERGENCY MEDICINE
Payer: MEDICARE

## 2018-08-17 VITALS
BODY MASS INDEX: 34.53 KG/M2 | DIASTOLIC BLOOD PRESSURE: 99 MMHG | SYSTOLIC BLOOD PRESSURE: 183 MMHG | RESPIRATION RATE: 18 BRPM | OXYGEN SATURATION: 97 % | HEIGHT: 67 IN | WEIGHT: 220 LBS | TEMPERATURE: 97.3 F | HEART RATE: 74 BPM

## 2018-08-17 DIAGNOSIS — T31.0 BURN (ANY DEGREE) INVOLVING LESS THAN 10% OF BODY SURFACE: Primary | ICD-10-CM

## 2018-08-17 LAB
ALBUMIN SERPL-MCNC: 3.6 G/DL (ref 3.5–5.2)
ALP BLD-CCNC: 80 U/L (ref 35–104)
ALT SERPL-CCNC: 6 U/L (ref 5–33)
ANION GAP SERPL CALCULATED.3IONS-SCNC: 11 MMOL/L (ref 7–19)
AST SERPL-CCNC: 15 U/L (ref 5–32)
BILIRUB SERPL-MCNC: 0.3 MG/DL (ref 0.2–1.2)
BUN BLDV-MCNC: 19 MG/DL (ref 8–23)
CALCIUM SERPL-MCNC: 8.7 MG/DL (ref 8.8–10.2)
CHLORIDE BLD-SCNC: 107 MMOL/L (ref 98–111)
CO2: 21 MMOL/L (ref 22–29)
CREAT SERPL-MCNC: 1 MG/DL (ref 0.5–0.9)
GFR NON-AFRICAN AMERICAN: 55
GLUCOSE BLD-MCNC: 91 MG/DL (ref 74–109)
HCT VFR BLD CALC: 30.5 % (ref 37–47)
HEMOGLOBIN: 9.3 G/DL (ref 12–16)
INR BLD: 2.34 (ref 0.88–1.18)
MCH RBC QN AUTO: 27.6 PG (ref 27–31)
MCHC RBC AUTO-ENTMCNC: 30.5 G/DL (ref 33–37)
MCV RBC AUTO: 90.5 FL (ref 81–99)
PDW BLD-RTO: 18 % (ref 11.5–14.5)
PLATELET # BLD: 224 K/UL (ref 130–400)
PMV BLD AUTO: 11.7 FL (ref 9.4–12.3)
POTASSIUM SERPL-SCNC: 3.9 MMOL/L (ref 3.5–5)
PROTHROMBIN TIME: 25.7 SEC (ref 12–14.6)
RBC # BLD: 3.37 M/UL (ref 4.2–5.4)
SODIUM BLD-SCNC: 139 MMOL/L (ref 136–145)
TOTAL PROTEIN: 7.6 G/DL (ref 6.6–8.7)
WBC # BLD: 5.3 K/UL (ref 4.8–10.8)

## 2018-08-17 PROCEDURE — 80053 COMPREHEN METABOLIC PANEL: CPT

## 2018-08-17 PROCEDURE — 99283 EMERGENCY DEPT VISIT LOW MDM: CPT

## 2018-08-17 PROCEDURE — 90471 IMMUNIZATION ADMIN: CPT | Performed by: NURSE PRACTITIONER

## 2018-08-17 PROCEDURE — 2500000003 HC RX 250 WO HCPCS: Performed by: NURSE PRACTITIONER

## 2018-08-17 PROCEDURE — 6360000002 HC RX W HCPCS: Performed by: NURSE PRACTITIONER

## 2018-08-17 PROCEDURE — 36415 COLL VENOUS BLD VENIPUNCTURE: CPT

## 2018-08-17 PROCEDURE — 85610 PROTHROMBIN TIME: CPT

## 2018-08-17 PROCEDURE — 85027 COMPLETE CBC AUTOMATED: CPT

## 2018-08-17 PROCEDURE — 99284 EMERGENCY DEPT VISIT MOD MDM: CPT | Performed by: EMERGENCY MEDICINE

## 2018-08-17 PROCEDURE — 90715 TDAP VACCINE 7 YRS/> IM: CPT | Performed by: NURSE PRACTITIONER

## 2018-08-17 RX ORDER — CEPHALEXIN 250 MG/1
500 CAPSULE ORAL 3 TIMES DAILY
Qty: 42 CAPSULE | Refills: 0 | Status: SHIPPED | OUTPATIENT
Start: 2018-08-17 | End: 2018-08-24

## 2018-08-17 RX ADMIN — TETANUS TOXOID, REDUCED DIPHTHERIA TOXOID AND ACELLULAR PERTUSSIS VACCINE, ADSORBED 0.5 ML: 5; 2.5; 8; 8; 2.5 SUSPENSION INTRAMUSCULAR at 16:21

## 2018-08-17 RX ADMIN — SILVER SULFADIAZINE: 10 CREAM TOPICAL at 16:09

## 2018-08-17 ASSESSMENT — ENCOUNTER SYMPTOMS: BURN: 1
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
## 2018-08-20 ENCOUNTER — TELEPHONE (OUTPATIENT)
Dept: INTERNAL MEDICINE | Age: 68
End: 2018-08-20

## 2018-08-20 RX ORDER — POTASSIUM CHLORIDE 750 MG/1
10 TABLET, EXTENDED RELEASE ORAL DAILY
Qty: 30 TABLET | Refills: 3 | Status: SHIPPED | OUTPATIENT
Start: 2018-08-20 | End: 2018-08-23 | Stop reason: SDUPTHER

## 2018-08-20 NOTE — TELEPHONE ENCOUNTER
I didn't see a recent INR on patient. Called patient and she states she will check it Wednesday and that she has been checking it.

## 2018-08-23 ENCOUNTER — PATIENT MESSAGE (OUTPATIENT)
Dept: INTERNAL MEDICINE | Age: 68
End: 2018-08-23

## 2018-08-23 DIAGNOSIS — G62.9 NEUROPATHY: ICD-10-CM

## 2018-08-23 DIAGNOSIS — J98.4 RESTRICTIVE AIRWAY DISEASE: ICD-10-CM

## 2018-08-23 RX ORDER — BUMETANIDE 1 MG/1
1 TABLET ORAL DAILY
Qty: 90 TABLET | Refills: 3 | Status: SHIPPED | OUTPATIENT
Start: 2018-08-23 | End: 2019-01-24 | Stop reason: SDUPTHER

## 2018-08-23 RX ORDER — SUCRALFATE ORAL 1 G/10ML
1 SUSPENSION ORAL 4 TIMES DAILY
Qty: 1200 ML | Refills: 3 | Status: SHIPPED | OUTPATIENT
Start: 2018-08-23 | End: 2018-09-06

## 2018-08-23 RX ORDER — ONDANSETRON 4 MG/1
4 TABLET, FILM COATED ORAL EVERY 8 HOURS PRN
Qty: 60 TABLET | Refills: 1 | Status: SHIPPED | OUTPATIENT
Start: 2018-08-23 | End: 2019-01-24

## 2018-08-23 RX ORDER — PANTOPRAZOLE SODIUM 40 MG/1
40 TABLET, DELAYED RELEASE ORAL 2 TIMES DAILY
Qty: 180 TABLET | Refills: 3 | Status: SHIPPED | OUTPATIENT
Start: 2018-08-23 | End: 2018-09-06

## 2018-08-23 RX ORDER — LEVOTHYROXINE SODIUM 0.1 MG/1
100 TABLET ORAL DAILY
Qty: 90 TABLET | Refills: 3 | Status: SHIPPED | OUTPATIENT
Start: 2018-08-23 | End: 2019-01-24 | Stop reason: SDUPTHER

## 2018-08-23 RX ORDER — ESCITALOPRAM OXALATE 10 MG/1
10 TABLET ORAL DAILY
Qty: 90 TABLET | Refills: 3 | Status: SHIPPED | OUTPATIENT
Start: 2018-08-23 | End: 2019-01-24 | Stop reason: SDUPTHER

## 2018-08-23 RX ORDER — RANITIDINE 150 MG/1
150 TABLET ORAL NIGHTLY
Qty: 90 TABLET | Refills: 3 | Status: SHIPPED | OUTPATIENT
Start: 2018-08-23 | End: 2018-09-06

## 2018-08-23 RX ORDER — ERGOCALCIFEROL (VITAMIN D2) 1250 MCG
50000 CAPSULE ORAL WEEKLY
Qty: 12 CAPSULE | Refills: 3 | Status: SHIPPED | OUTPATIENT
Start: 2018-08-23 | End: 2019-01-24 | Stop reason: SDUPTHER

## 2018-08-23 RX ORDER — CALCIPOTRIENE 50 UG/G
CREAM TOPICAL
Qty: 3 TUBE | Refills: 3 | Status: SHIPPED | OUTPATIENT
Start: 2018-08-23 | End: 2020-07-06 | Stop reason: SDUPTHER

## 2018-08-23 RX ORDER — ROPINIROLE 1 MG/1
1 TABLET, FILM COATED ORAL 3 TIMES DAILY
Qty: 270 TABLET | Refills: 3 | Status: SHIPPED | OUTPATIENT
Start: 2018-08-23 | End: 2019-01-24

## 2018-08-23 RX ORDER — CLOBETASOL PROPIONATE 0.5 MG/G
CREAM TOPICAL
Qty: 3 TUBE | Refills: 3 | Status: SHIPPED | OUTPATIENT
Start: 2018-08-23 | End: 2020-07-06 | Stop reason: SDUPTHER

## 2018-08-23 RX ORDER — LANOLIN ALCOHOL/MO/W.PET/CERES
325 CREAM (GRAM) TOPICAL 2 TIMES DAILY
Qty: 180 TABLET | Refills: 3 | Status: SHIPPED | OUTPATIENT
Start: 2018-08-23 | End: 2019-01-24

## 2018-08-23 RX ORDER — CEPHALEXIN 250 MG/1
500 CAPSULE ORAL 3 TIMES DAILY
Qty: 42 CAPSULE | Refills: 0 | Status: CANCELLED | OUTPATIENT
Start: 2018-08-23 | End: 2018-08-30

## 2018-08-23 RX ORDER — WARFARIN SODIUM 7.5 MG/1
7.5 TABLET ORAL DAILY
Qty: 90 TABLET | Refills: 3 | Status: SHIPPED | OUTPATIENT
Start: 2018-08-23 | End: 2018-10-12 | Stop reason: SDUPTHER

## 2018-08-23 RX ORDER — ALBUTEROL SULFATE 90 UG/1
2 AEROSOL, METERED RESPIRATORY (INHALATION) EVERY 6 HOURS PRN
Qty: 3 INHALER | Refills: 3 | Status: SHIPPED | OUTPATIENT
Start: 2018-08-23 | End: 2019-01-24 | Stop reason: SDUPTHER

## 2018-08-23 RX ORDER — POTASSIUM CHLORIDE 750 MG/1
10 TABLET, EXTENDED RELEASE ORAL DAILY
Qty: 90 TABLET | Refills: 3 | Status: SHIPPED | OUTPATIENT
Start: 2018-08-23 | End: 2019-01-24 | Stop reason: SDUPTHER

## 2018-08-23 RX ORDER — LISINOPRIL 20 MG/1
20 TABLET ORAL DAILY
Qty: 90 TABLET | Refills: 3 | Status: SHIPPED | OUTPATIENT
Start: 2018-08-23 | End: 2019-01-24 | Stop reason: SDUPTHER

## 2018-08-23 NOTE — TELEPHONE ENCOUNTER
suspension 1200 mL 3     Sig: Take 10 mLs by mouth 4 times daily    warfarin (COUMADIN) 7.5 MG tablet 90 tablet 3     Sig: Take 1 tablet by mouth daily       Last Appointment Date: 8/6/2018  Next Appointment Date: 10/12/2018    Allergies   Allergen Reactions    Insect Extract Allergy Skin Test Anaphylaxis     Bee stings    Prednisone      Headache and upset stomach    Zanaflex [Tizanidine Hcl]      Passed out lost control of body functions    Lortab [Hydrocodone-Acetaminophen] Nausea And Vomiting     Sweats, weak, nausea and vomiting    Other Nausea And Vomiting     Opiates------sweating, weak        Oxycodone-Acetaminophen Nausea And Vomiting     Sweating and vomiting     Ultram [Tramadol Hcl] Nausea And Vomiting     Sweating, weak, nausea and vomiting

## 2018-08-24 ENCOUNTER — ANTI-COAG VISIT (OUTPATIENT)
Dept: INTERNAL MEDICINE | Age: 68
End: 2018-08-24

## 2018-08-24 ENCOUNTER — HOSPITAL ENCOUNTER (OUTPATIENT)
Dept: VASCULAR LAB | Age: 68
Discharge: HOME | End: 2018-08-24
Payer: MEDICARE

## 2018-08-24 DIAGNOSIS — I82.492 ACUTE DEEP VEIN THROMBOSIS (DVT) OF OTHER SPECIFIED VEIN OF LEFT LOWER EXTREMITY (HCC): Primary | ICD-10-CM

## 2018-08-24 LAB — INR BLD: 3.4

## 2018-08-24 PROCEDURE — 93971 EXTREMITY STUDY: CPT

## 2018-08-27 ENCOUNTER — HOSPITAL ENCOUNTER (OUTPATIENT)
Dept: WOUND CARE | Age: 68
Discharge: HOME | End: 2018-08-27
Payer: MEDICARE

## 2018-08-27 VITALS
HEIGHT: 67 IN | BODY MASS INDEX: 34.53 KG/M2 | TEMPERATURE: 97.3 F | HEART RATE: 78 BPM | DIASTOLIC BLOOD PRESSURE: 78 MMHG | WEIGHT: 220 LBS | SYSTOLIC BLOOD PRESSURE: 121 MMHG | RESPIRATION RATE: 18 BRPM

## 2018-08-27 DIAGNOSIS — T21.22XA BURN OF ABDOMEN WALL, SECOND DEGREE, INITIAL ENCOUNTER: ICD-10-CM

## 2018-08-27 PROCEDURE — 99214 OFFICE O/P EST MOD 30 MIN: CPT | Performed by: NURSE PRACTITIONER

## 2018-08-27 PROCEDURE — 99213 OFFICE O/P EST LOW 20 MIN: CPT
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
## 2018-08-28 ENCOUNTER — TELEPHONE (OUTPATIENT)
Dept: INTERNAL MEDICINE | Age: 68
End: 2018-08-28

## 2018-08-28 ENCOUNTER — OFFICE VISIT (OUTPATIENT)
Dept: INTERNAL MEDICINE | Age: 68
End: 2018-08-28
Payer: MEDICARE

## 2018-08-28 VITALS
BODY MASS INDEX: 34.76 KG/M2 | HEIGHT: 67 IN | WEIGHT: 221.5 LBS | HEART RATE: 78 BPM | DIASTOLIC BLOOD PRESSURE: 90 MMHG | SYSTOLIC BLOOD PRESSURE: 130 MMHG | OXYGEN SATURATION: 98 %

## 2018-08-28 DIAGNOSIS — G62.9 NEUROPATHY: ICD-10-CM

## 2018-08-28 DIAGNOSIS — I82.90 BLOOD CLOT IN VEIN: ICD-10-CM

## 2018-08-28 DIAGNOSIS — I82.90 BLOOD CLOT IN VEIN: Primary | ICD-10-CM

## 2018-08-28 LAB
INR BLD: 2.33 (ref 0.88–1.18)
PROTHROMBIN TIME: 25.6 SEC (ref 12–14.6)

## 2018-08-28 PROCEDURE — G8417 CALC BMI ABV UP PARAM F/U: HCPCS | Performed by: INTERNAL MEDICINE

## 2018-08-28 PROCEDURE — G8400 PT W/DXA NO RESULTS DOC: HCPCS | Performed by: INTERNAL MEDICINE

## 2018-08-28 PROCEDURE — 3017F COLORECTAL CA SCREEN DOC REV: CPT | Performed by: INTERNAL MEDICINE

## 2018-08-28 PROCEDURE — G8427 DOCREV CUR MEDS BY ELIG CLIN: HCPCS | Performed by: INTERNAL MEDICINE

## 2018-08-28 PROCEDURE — 1123F ACP DISCUSS/DSCN MKR DOCD: CPT | Performed by: INTERNAL MEDICINE

## 2018-08-28 PROCEDURE — 4040F PNEUMOC VAC/ADMIN/RCVD: CPT | Performed by: INTERNAL MEDICINE

## 2018-08-28 PROCEDURE — 1036F TOBACCO NON-USER: CPT | Performed by: INTERNAL MEDICINE

## 2018-08-28 PROCEDURE — 99213 OFFICE O/P EST LOW 20 MIN: CPT | Performed by: INTERNAL MEDICINE

## 2018-08-28 PROCEDURE — 1111F DSCHRG MED/CURRENT MED MERGE: CPT | Performed by: INTERNAL MEDICINE

## 2018-08-28 PROCEDURE — 1101F PT FALLS ASSESS-DOCD LE1/YR: CPT | Performed by: INTERNAL MEDICINE

## 2018-08-28 PROCEDURE — 1090F PRES/ABSN URINE INCON ASSESS: CPT | Performed by: INTERNAL MEDICINE

## 2018-08-28 PROCEDURE — G8598 ASA/ANTIPLAT THER USED: HCPCS | Performed by: INTERNAL MEDICINE

## 2018-08-28 ASSESSMENT — ENCOUNTER SYMPTOMS
BLOOD IN STOOL: 0
VOMITING: 0
WHEEZING: 0
APNEA: 0
BACK PAIN: 1
SHORTNESS OF BREATH: 0
CONSTIPATION: 0
TROUBLE SWALLOWING: 0
SINUS PRESSURE: 0
SORE THROAT: 0
STRIDOR: 0
EYE DISCHARGE: 0
DIARRHEA: 0
EYE ITCHING: 0
COUGH: 0
CHEST TIGHTNESS: 0
SINUS PAIN: 0
RHINORRHEA: 0
ABDOMINAL PAIN: 0
VOICE CHANGE: 0
ABDOMINAL DISTENTION: 0
COLOR CHANGE: 0

## 2018-08-28 NOTE — PROGRESS NOTES
History Narrative    None      Family History   Problem Relation Age of Onset    Uterine Cancer Mother     Cervical Cancer Mother     Coronary Art Dis Mother     Heart Disease Father     Lung Cancer Father     Other Father         renal failure    Colon Cancer Brother     Colon Polyps Brother     Uterine Cancer Maternal Grandmother     Cervical Cancer Maternal Grandmother     Diabetes Maternal Grandmother     Cervical Cancer Sister     Other Sister         fibromyalgia    Diabetes Paternal Aunt     Esophageal Cancer Neg Hx     Liver Cancer Neg Hx     Liver Disease Neg Hx     Stomach Cancer Neg Hx     Rectal Cancer Neg Hx         Current Outpatient Prescriptions   Medication Sig Dispense Refill    Gabapentin, Once-Daily, 300 MG TABS Take 300 mg by mouth 3 times daily as needed (leg pain). . 90 tablet 1    albuterol sulfate HFA (VENTOLIN HFA) 108 (90 Base) MCG/ACT inhaler Inhale 2 puffs into the lungs every 6 hours as needed for Wheezing 3 Inhaler 3    bumetanide (BUMEX) 1 MG tablet Take 1 tablet by mouth daily 90 tablet 3    calcipotriene (DOVONEX) 0.005 % cream Use topically as needed 3 Tube 3    clobetasol (TEMOVATE) 0.05 % cream Apply topically 2 times daily.  3 Tube 3    diclofenac sodium (VOLTAREN) 1 % GEL Apply 2 g topically 2 times daily 3 Tube 3    ergocalciferol (DRISDOL) 52039 units capsule Take 1 capsule by mouth once a week 12 capsule 3    escitalopram (LEXAPRO) 10 MG tablet Take 1 tablet by mouth daily 90 tablet 3    ferrous sulfate (FE TABS) 325 (65 Fe) MG EC tablet Take 1 tablet by mouth 2 times daily 180 tablet 3    fluticasone-salmeterol (ADVAIR DISKUS) 250-50 MCG/DOSE AEPB Inhale 1 puff into the lungs every 12 hours 180 each 3    levothyroxine (SYNTHROID) 100 MCG tablet Take 1 tablet by mouth Daily 90 tablet 3    lisinopril (PRINIVIL;ZESTRIL) 20 MG tablet Take 1 tablet by mouth daily 90 tablet 3    ondansetron (ZOFRAN) 4 MG tablet Take 1 tablet by mouth every 8 hours as needed for Nausea or Vomiting 60 tablet 1    pantoprazole (PROTONIX) 40 MG tablet Take 1 tablet by mouth 2 times daily 180 tablet 3    potassium chloride (KLOR-CON M) 10 MEQ extended release tablet Take 1 tablet by mouth daily 90 tablet 3    ranitidine (ZANTAC) 150 MG tablet Take 1 tablet by mouth nightly 90 tablet 3    rOPINIRole (REQUIP) 1 MG tablet Take 1 tablet by mouth 3 times daily 270 tablet 3    silver sulfADIAZINE (SILVADENE) 1 % cream Apply topically daily. 50 g 3    sucralfate (CARAFATE) 1 GM/10ML suspension Take 10 mLs by mouth 4 times daily 1200 mL 3    warfarin (COUMADIN) 7.5 MG tablet Take 1 tablet by mouth daily 90 tablet 3    HYDROmorphone (DILAUDID) 2 MG tablet Take 2 mg by mouth every 3 hours as needed for Pain. Mitch Reges Multiple Vitamin (MULTIVITAMIN PO) Take 1 tablet by mouth daily        No current facility-administered medications for this visit. Patient Active Problem List   Diagnosis    Vomiting    Burping    GERD (gastroesophageal reflux disease)    History of gastric bypass    Family history of colon cancer    Bloating    Enuresis    Nausea and vomiting    Colon cancer screening    Stable angina ([facility] [address]    Encounter for current long-term use of anticoagulants    Primary osteoarthritis of right knee    Arthritis of knee    Essential hypertension    Hyperglycemia    MER (obstructive sleep apnea)    Slow transit constipation    Iron deficiency anemia    Restrictive airway disease    DVT, lower extremity, proximal, acute, unspecified laterality ([address]    H/O systemic lupus erythematosus (SLE)    Anticoagulated on Coumadin    Burn of abdomen wall, second degree, initial encounter        Review of Systems   Constitutional: Negative for activity change, appetite change, chills, diaphoresis, fatigue, fever and unexpected weight change.    HENT: Negative for congestion, ear pain, hearing loss, nosebleeds, postnasal drip, rhinorrhea, sinus pain, sinus pressure, Pupils are equal, round, and reactive to light. Right eye exhibits no discharge. Left eye exhibits no discharge. Neck: Normal range of motion. No tracheal deviation present. Cardiovascular: Normal rate, regular rhythm and normal heart sounds. Exam reveals no gallop and no friction rub. No murmur heard. Pulmonary/Chest: Effort normal and breath sounds normal. No respiratory distress. She has no wheezes. She has no rales. She exhibits no tenderness. Abdominal: Soft. Bowel sounds are normal. There is no tenderness. There is no rebound and no guarding. Musculoskeletal: Normal range of motion. She exhibits edema. She exhibits no deformity. Lumbar back: She exhibits tenderness, pain and spasm. She exhibits normal range of motion, no bony tenderness and no swelling. Back:    Left lower extremity is extremely swollen. Is very tender to touch. She does have calf tenderness noted. Lymphadenopathy:     She has no cervical adenopathy. Neurological: She is alert. Skin: Skin is warm, dry and intact. No lesion and no rash noted. No erythema. Psychiatric: She has a normal mood and affect. Her mood appears not anxious. She does not exhibit a depressed mood. Nursing note and vitals reviewed. 1. Need for prophylactic vaccination and inoculation against varicella    2. Neuropathy    3. Blood clot in vein         ASSESSMENT/PLAN:    77-year-old woman here for evaluation of left lower extremity pain and swelling    1. Left lower extremity pain and swelling: Patient does have a subacute DVT of the left lower extremity. She is anticoagulated with Coumadin. We'll increase her gabapentin to 3 times a day dosing. We'll check a pro time today. She does have some leftover Demerol for Dr. Channing Burnett office, and she can use this for pain control. [facility] was seen today for leg swelling.     Diagnoses and all orders for this visit:    Need for prophylactic vaccination and inoculation against varicella    Neuropathy  -     Protime-INR; Future  -     Gabapentin, Once-Daily, 300 MG TABS; Take 300 mg by mouth 3 times daily as needed (leg pain). .    Blood clot in vein   -     Protime-INR; Future    Other orders  -     Cancel:  DIABETES FOOT EXAM  -     Cancel: zoster recombinant adjuvanted vaccine (SHINGRIX) 50 MCG SUSR injection; 50 MCG IM then repeat 2-6 months. Return as ordered. Orders Placed This Encounter   Procedures    Protime-INR     Standing Status:   Future     Standing Expiration Date:   8/28/2019     Order Specific Question:   Daily Coumadin Dose?      Answer:   MEERA Grace MD

## 2018-08-28 NOTE — TELEPHONE ENCOUNTER
Called patient left message for her with the instructions to stay on the same dose and to call us if she had any questions.

## 2018-09-06 ENCOUNTER — HOSPITAL ENCOUNTER (OUTPATIENT)
Dept: WOUND CARE | Age: 68
Discharge: HOME | End: 2018-09-06
Payer: MEDICARE

## 2018-09-06 VITALS
DIASTOLIC BLOOD PRESSURE: 95 MMHG | HEIGHT: 67 IN | WEIGHT: 221 LBS | RESPIRATION RATE: 18 BRPM | BODY MASS INDEX: 34.69 KG/M2 | TEMPERATURE: 97.4 F | SYSTOLIC BLOOD PRESSURE: 181 MMHG | HEART RATE: 63 BPM

## 2018-09-06 DIAGNOSIS — T21.22XA BURN OF ABDOMEN WALL, SECOND DEGREE, INITIAL ENCOUNTER: ICD-10-CM

## 2018-09-06 PROCEDURE — 99213 OFFICE O/P EST LOW 20 MIN: CPT | Performed by: NURSE PRACTITIONER

## 2018-09-06 PROCEDURE — 99212 OFFICE O/P EST SF 10 MIN: CPT

## 2018-09-06 ASSESSMENT — PAIN SCALES - GENERAL: PAINLEVEL_OUTOF10: 1

## 2018-09-06 ASSESSMENT — PAIN DESCRIPTION - LOCATION: LOCATION: ABDOMEN;FOOT

## 2018-09-06 ASSESSMENT — PAIN DESCRIPTION - PAIN TYPE: TYPE: ACUTE PAIN
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
## 2018-09-06 NOTE — PLAN OF CARE
Problem: Wound:  Goal: Will show signs of wound healing; wound closure and no evidence of infection  Will show signs of wound healing; wound closure and no evidence of infection   Outcome: Met This Shift

## 2018-09-13 ENCOUNTER — ANTI-COAG VISIT (OUTPATIENT)
Dept: INTERNAL MEDICINE | Age: 68
End: 2018-09-13

## 2018-09-13 LAB — INR BLD: 1.3

## 2018-09-20 ENCOUNTER — NURSE ONLY (OUTPATIENT)
Dept: INTERNAL MEDICINE | Age: 68
End: 2018-09-20
Payer: MEDICARE

## 2018-09-20 DIAGNOSIS — I82.4Y9 DVT, LOWER EXTREMITY, PROXIMAL, ACUTE, UNSPECIFIED LATERALITY (HCC): Primary | ICD-10-CM

## 2018-09-20 LAB
INTERNATIONAL NORMALIZATION RATIO, POC: 1.7
PROTHROMBIN TIME, POC: NORMAL

## 2018-09-20 PROCEDURE — 85610 PROTHROMBIN TIME: CPT | Performed by: INTERNAL MEDICINE

## 2018-09-26 ENCOUNTER — NURSE ONLY (OUTPATIENT)
Dept: INTERNAL MEDICINE | Age: 68
End: 2018-09-26
Payer: MEDICARE

## 2018-09-26 DIAGNOSIS — I82.4Y9 DVT, LOWER EXTREMITY, PROXIMAL, ACUTE, UNSPECIFIED LATERALITY (HCC): Primary | ICD-10-CM

## 2018-09-26 LAB
INTERNATIONAL NORMALIZATION RATIO, POC: 2
PROTHROMBIN TIME, POC: NORMAL

## 2018-09-26 PROCEDURE — 85610 PROTHROMBIN TIME: CPT | Performed by: INTERNAL MEDICINE

## 2018-09-26 NOTE — PROGRESS NOTES
Ms. Jaime Glover was here today. INR today:   Results for orders placed or performed in visit on 09/26/18   POCT INR   Result Value Ref Range    INR 2.0     Protime       INR Goal: 2.0-3.0    Dosing Plan  As of 9/26/2018    TTR:   16.6 % (5.6 mo)   Full warfarin instructions:   9/26: 15 mg; Otherwise 15 mg on Mon, Thu; 7.5 mg all other days          Patient reports that her doctor wants her closer to 3.0 because of her blood clots. PLAN: TAKE 15MG TONIGHT, THEN CHANGE TO 15MG ON MON, THURS, SAT, 7.5MG ALL OTHER DAYS  NEXT COUMADIN CLINIC APT IS: 10/4/2018 AT 7:15AM    Coumadin Clinic Hours  Tuesday 7:30am - 4:00pm  Wednesday 7:30am - 4:00pm  Thursday 7:30am - 4:00pm    IF IT'S AN EMERGENCY, PLEASE CALL 911 OR GO TO YOUR NEAREST EMERGENCY ROOM. [facility] INTERNAL MEDICINE COUMADIN CLINIC [phone]  IF UNABLE TO REACH COUMADIN CLINIC, [address] [place] [address], [phone].

## 2018-10-04 ENCOUNTER — NURSE ONLY (OUTPATIENT)
Dept: INTERNAL MEDICINE | Age: 68
End: 2018-10-04
Payer: MEDICARE

## 2018-10-04 DIAGNOSIS — Z79.01 ANTICOAGULATED ON COUMADIN: ICD-10-CM

## 2018-10-04 DIAGNOSIS — I82.4Y9 DVT, LOWER EXTREMITY, PROXIMAL, ACUTE, UNSPECIFIED LATERALITY (HCC): Primary | ICD-10-CM

## 2018-10-04 LAB
INTERNATIONAL NORMALIZATION RATIO, POC: 1.5
PROTHROMBIN TIME, POC: NORMAL

## 2018-10-04 PROCEDURE — 85610 PROTHROMBIN TIME: CPT | Performed by: INTERNAL MEDICINE

## 2018-10-04 NOTE — PROGRESS NOTES
Ms. Esa Mccracken was here today. INR today:   Results for orders placed or performed in visit on 10/04/18   POCT INR   Result Value Ref Range    INR 1.5     Protime       INR Goal: 2.0-3.0    Dosing Plan  As of 10/4/2018    TTR:   15.8 % (5.9 mo)   Full warfarin instructions:   7.5 mg on Mon, Wed, Fri; 15 mg all other days          Patient reports when she was 2.0 last week, she had been taking 15mg \"nearly every day. \"  Instructed her to take 15mg 4 days a week, 7.5mg the other days. She was 3.4 on 8/24 just taking 7.5mg daily. PLAN: TAKE 15 MG EVERY DAY, EXCEPT 7.5 ON MON, WED, AND FRI  NEXT COUMADIN CLINIC APT IS: 10/12/2018 CHECK AT APPT WITH DR. Gagandeep Sorenson    Coumadin Clinic Hours  Tuesday 7:30am - 4:00pm  Wednesday 7:30am - 4:00pm  Thursday 7:30am - 4:00pm  IF IT'S AN EMERGENCY, PLEASE CALL 911 OR GO TO YOUR NEAREST EMERGENCY ROOM. [facility] INTERNAL MEDICINE COUMADIN CLINIC [phone]  IF UNABLE TO REACH COUMADIN CLINIC, [address] [place] [address] Rd, [phone].

## 2018-10-05 ENCOUNTER — TELEPHONE (OUTPATIENT)
Dept: INTERNAL MEDICINE | Age: 68
End: 2018-10-05

## 2018-10-05 NOTE — TELEPHONE ENCOUNTER
They need office notes that say she has peripheral neuropathy with evidence of callous formation and history of pre ulcerative callous with dr Mojgan Renae or physical signature and date.

## 2018-10-12 ENCOUNTER — OFFICE VISIT (OUTPATIENT)
Dept: INTERNAL MEDICINE | Age: 68
End: 2018-10-12
Payer: MEDICARE

## 2018-10-12 ENCOUNTER — CARE COORDINATION (OUTPATIENT)
Dept: CARE COORDINATION | Age: 68
End: 2018-10-12

## 2018-10-12 VITALS
DIASTOLIC BLOOD PRESSURE: 98 MMHG | BODY MASS INDEX: 34.84 KG/M2 | HEIGHT: 67 IN | OXYGEN SATURATION: 98 % | SYSTOLIC BLOOD PRESSURE: 164 MMHG | HEART RATE: 88 BPM | WEIGHT: 222 LBS

## 2018-10-12 DIAGNOSIS — I82.90 DEEP VEIN THROMBOSIS (DVT) OF NON-EXTREMITY VEIN, UNSPECIFIED CHRONICITY: ICD-10-CM

## 2018-10-12 DIAGNOSIS — E11.649 UNCONTROLLED TYPE 2 DIABETES MELLITUS WITH HYPOGLYCEMIA, UNSPECIFIED HYPOGLYCEMIA COMA STATUS (HCC): ICD-10-CM

## 2018-10-12 DIAGNOSIS — E11.9 TYPE 2 DIABETES MELLITUS WITHOUT COMPLICATION (HCC): ICD-10-CM

## 2018-10-12 DIAGNOSIS — Z91.81 AT HIGH RISK FOR FALLS: ICD-10-CM

## 2018-10-12 DIAGNOSIS — E55.9 VITAMIN D DEFICIENCY: ICD-10-CM

## 2018-10-12 DIAGNOSIS — F32.A DEPRESSION, UNSPECIFIED DEPRESSION TYPE: ICD-10-CM

## 2018-10-12 DIAGNOSIS — I10 ESSENTIAL HYPERTENSION: ICD-10-CM

## 2018-10-12 DIAGNOSIS — Z23 NEED FOR SHINGLES VACCINE: ICD-10-CM

## 2018-10-12 DIAGNOSIS — Z23 NEEDS FLU SHOT: ICD-10-CM

## 2018-10-12 DIAGNOSIS — E03.9 HYPOTHYROIDISM, UNSPECIFIED TYPE: ICD-10-CM

## 2018-10-12 DIAGNOSIS — E11.649 UNCONTROLLED TYPE 2 DIABETES MELLITUS WITH HYPOGLYCEMIA, UNSPECIFIED HYPOGLYCEMIA COMA STATUS (HCC): Primary | ICD-10-CM

## 2018-10-12 LAB
ALBUMIN SERPL-MCNC: 4.3 G/DL (ref 3.5–5.2)
ALP BLD-CCNC: 99 U/L (ref 35–104)
ALT SERPL-CCNC: 9 U/L (ref 5–33)
ANION GAP SERPL CALCULATED.3IONS-SCNC: 12 MMOL/L (ref 7–19)
AST SERPL-CCNC: 21 U/L (ref 5–32)
BASOPHILS ABSOLUTE: 0 K/UL (ref 0–0.2)
BASOPHILS RELATIVE PERCENT: 0.5 % (ref 0–1)
BILIRUB SERPL-MCNC: 0.3 MG/DL (ref 0.2–1.2)
BUN BLDV-MCNC: 13 MG/DL (ref 8–23)
CALCIUM SERPL-MCNC: 9.5 MG/DL (ref 8.8–10.2)
CHLORIDE BLD-SCNC: 103 MMOL/L (ref 98–111)
CHOLESTEROL, TOTAL: 208 MG/DL (ref 160–199)
CO2: 26 MMOL/L (ref 22–29)
CREAT SERPL-MCNC: 0.9 MG/DL (ref 0.5–0.9)
EOSINOPHILS ABSOLUTE: 0 K/UL (ref 0–0.6)
EOSINOPHILS RELATIVE PERCENT: 0.9 % (ref 0–5)
GFR NON-AFRICAN AMERICAN: >60
GLUCOSE BLD-MCNC: 81 MG/DL (ref 74–109)
HBA1C MFR BLD: 5.2 % (ref 4–6)
HCT VFR BLD CALC: 36.4 % (ref 37–47)
HDLC SERPL-MCNC: 94 MG/DL (ref 65–121)
HEMOGLOBIN: 11.2 G/DL (ref 12–16)
INR BLD: 1.8 (ref 0.88–1.18)
LDL CHOLESTEROL CALCULATED: 99 MG/DL
LYMPHOCYTES ABSOLUTE: 1.4 K/UL (ref 1.1–4.5)
LYMPHOCYTES RELATIVE PERCENT: 32.4 % (ref 20–40)
MCH RBC QN AUTO: 27.6 PG (ref 27–31)
MCHC RBC AUTO-ENTMCNC: 30.8 G/DL (ref 33–37)
MCV RBC AUTO: 89.7 FL (ref 81–99)
MONOCYTES ABSOLUTE: 0.4 K/UL (ref 0–0.9)
MONOCYTES RELATIVE PERCENT: 9.4 % (ref 0–10)
NEUTROPHILS ABSOLUTE: 2.4 K/UL (ref 1.5–7.5)
NEUTROPHILS RELATIVE PERCENT: 56.8 % (ref 50–65)
PDW BLD-RTO: 17.7 % (ref 11.5–14.5)
PLATELET # BLD: 97 K/UL (ref 130–400)
PMV BLD AUTO: 13 FL (ref 9.4–12.3)
POTASSIUM SERPL-SCNC: 3.8 MMOL/L (ref 3.5–5)
PROTHROMBIN TIME: 20.9 SEC (ref 12–14.6)
RBC # BLD: 4.06 M/UL (ref 4.2–5.4)
SODIUM BLD-SCNC: 141 MMOL/L (ref 136–145)
TOTAL PROTEIN: 8.2 G/DL (ref 6.6–8.7)
TRIGL SERPL-MCNC: 75 MG/DL (ref 0–149)
TSH SERPL DL<=0.05 MIU/L-ACNC: 3.38 UIU/ML (ref 0.27–4.2)
WBC # BLD: 4.3 K/UL (ref 4.8–10.8)

## 2018-10-12 PROCEDURE — 1036F TOBACCO NON-USER: CPT | Performed by: INTERNAL MEDICINE

## 2018-10-12 PROCEDURE — G8427 DOCREV CUR MEDS BY ELIG CLIN: HCPCS | Performed by: INTERNAL MEDICINE

## 2018-10-12 PROCEDURE — 1090F PRES/ABSN URINE INCON ASSESS: CPT | Performed by: INTERNAL MEDICINE

## 2018-10-12 PROCEDURE — 4040F PNEUMOC VAC/ADMIN/RCVD: CPT | Performed by: INTERNAL MEDICINE

## 2018-10-12 PROCEDURE — 2022F DILAT RTA XM EVC RTNOPTHY: CPT | Performed by: INTERNAL MEDICINE

## 2018-10-12 PROCEDURE — G8482 FLU IMMUNIZE ORDER/ADMIN: HCPCS | Performed by: INTERNAL MEDICINE

## 2018-10-12 PROCEDURE — 99214 OFFICE O/P EST MOD 30 MIN: CPT | Performed by: INTERNAL MEDICINE

## 2018-10-12 PROCEDURE — G0008 ADMIN INFLUENZA VIRUS VAC: HCPCS | Performed by: INTERNAL MEDICINE

## 2018-10-12 PROCEDURE — G8417 CALC BMI ABV UP PARAM F/U: HCPCS | Performed by: INTERNAL MEDICINE

## 2018-10-12 PROCEDURE — 3044F HG A1C LEVEL LT 7.0%: CPT | Performed by: INTERNAL MEDICINE

## 2018-10-12 PROCEDURE — 1101F PT FALLS ASSESS-DOCD LE1/YR: CPT | Performed by: INTERNAL MEDICINE

## 2018-10-12 PROCEDURE — G8598 ASA/ANTIPLAT THER USED: HCPCS | Performed by: INTERNAL MEDICINE

## 2018-10-12 PROCEDURE — 90662 IIV NO PRSV INCREASED AG IM: CPT | Performed by: INTERNAL MEDICINE

## 2018-10-12 PROCEDURE — 3017F COLORECTAL CA SCREEN DOC REV: CPT | Performed by: INTERNAL MEDICINE

## 2018-10-12 PROCEDURE — 1123F ACP DISCUSS/DSCN MKR DOCD: CPT | Performed by: INTERNAL MEDICINE

## 2018-10-12 PROCEDURE — G8400 PT W/DXA NO RESULTS DOC: HCPCS | Performed by: INTERNAL MEDICINE

## 2018-10-12 RX ORDER — WARFARIN SODIUM 7.5 MG/1
7.5 TABLET ORAL DAILY
Qty: 90 TABLET | Refills: 3 | Status: SHIPPED | OUTPATIENT
Start: 2018-10-12 | End: 2018-12-14 | Stop reason: SDUPTHER

## 2018-10-12 ASSESSMENT — ENCOUNTER SYMPTOMS
CONSTIPATION: 0
ABDOMINAL DISTENTION: 0
VOICE CHANGE: 0
RHINORRHEA: 0
ABDOMINAL PAIN: 0
CHEST TIGHTNESS: 0
BACK PAIN: 1
DIARRHEA: 0
EYE DISCHARGE: 0
BLOOD IN STOOL: 0
EYE ITCHING: 0
COUGH: 0
VOMITING: 0
SORE THROAT: 0
APNEA: 0
WHEEZING: 0
SINUS PRESSURE: 0
STRIDOR: 0
SHORTNESS OF BREATH: 0
TROUBLE SWALLOWING: 0
COLOR CHANGE: 0
SINUS PAIN: 0

## 2018-10-12 NOTE — TELEPHONE ENCOUNTER
----- Message from Bobby Montiel MD sent at 10/12/2018  1:31 PM CDT -----  INR 1.8. 15 mg of coumadin tonight. Then, back to regular dosing. Was she off of her coumadin?  Check next week

## 2018-10-12 NOTE — PROGRESS NOTES
7/2006    unremarkable s/p gastric bypass    UPPER GASTROINTESTINAL ENDOSCOPY  8/10/15    Dr Shikha Early ENDOSCOPY  4/1/16    Dr Lukas Koenig N/A 4/1/2016    EGD ESOPHAGOGASTRODUODENOSCOPY performed by Jillian Herrera MD at [address] [facility] Endoscopy    [address] [facility] 2003      Social History     Social History    Marital status:      Spouse name: N/A    Number of children: N/A    Years of education: N/A     Social History Main Topics    Smoking status: Never Smoker    Smokeless tobacco: Never Used    Alcohol use No    Drug use: No    Sexual activity: Not Currently     Other Topics Concern    Not on file     Social History Narrative    No narrative on file      Family History   Problem Relation Age of Onset    Uterine Cancer Mother     Cervical Cancer Mother     Coronary Art Dis Mother     Heart Disease Father     Lung Cancer Father     Other Father         renal failure    Colon Cancer Brother     Colon Polyps Brother     Uterine Cancer Maternal Grandmother     Cervical Cancer Maternal Grandmother     Diabetes Maternal Grandmother     Cervical Cancer Sister     Other Sister         fibromyalgia    Diabetes Paternal Aunt     Esophageal Cancer Neg Hx     Liver Cancer Neg Hx     Liver Disease Neg Hx     Stomach Cancer Neg Hx     Rectal Cancer Neg Hx         Current Outpatient Prescriptions   Medication Sig Dispense Refill    warfarin (COUMADIN) 7.5 MG tablet Take 1 tablet by mouth daily 90 tablet 3    zoster recombinant adjuvanted vaccine (SHINGRIX) 50 MCG SUSR injection Inject 0.5 mLs into the muscle once for 1 dose Repeat in 2-6 months 0.5 mL 0    Gabapentin, Once-Daily, 300 MG TABS Take 300 mg by mouth 3 times daily as needed (leg pain). . 90 tablet 1    albuterol sulfate HFA (VENTOLIN HFA) 108 (90 Base) MCG/ACT inhaler Inhale 2 puffs into the lungs every 6 hours as needed for Wheezing 3 Inhaler 3    bumetanide 3. Vitamin D deficiency     4. At high risk for falls        ASSESSMENT/PLAN:    77-year-old woman here for follow-up    1. Type II diabetes: Difficult to assess. Patient is not been checking her blood sugars. We do not have any labs on her. We'll send her to the lab today. 2. Hypercoagulability with a known DVT: I'm not quite sure if this patient's taking her Coumadin or not. We'll check a PT/INR today. Her Coumadin has been sent to the pharmacy. In terms of her prescription coverage, will get liver care coordinators to work with her and see if we can do about helping her to get the medications that she needs. 3.  Hypertension: Blood pressure poorly controlled. Patient has not had her medication area we need to call the patient when she gets home today and find out what medication she does have at home in which when she needs sent into her pharmacy. We will see what we can do in terms of getting her set up for $4 list of medications and whatever else we can do to help get her some prescription coverage    4. Depression: Stable    5. Hypothyroidism: Check a TSH    6. Vitamin D deficiency: Check vitamin D level    7. Flu vaccine given    8. Prescription for shingles vaccine given to patient  [facility] today for anticoagulation. Diagnoses and all orders for this visit:    Deep vein thrombosis (DVT) of non-extremity vein, unspecified chronicity  -     Protime-INR; Future    Uncontrolled type 2 diabetes mellitus with hypoglycemia, unspecified hypoglycemia coma status (HCC)  -     CBC Auto Differential; Future  -     TSH without Reflex; Future  -     Lipid Panel; Future  -     Hemoglobin A1C; Future  -     Comprehensive Metabolic Panel; Future  -     Microalbumin / Creatinine Urine Ratio; Future  -     CBC Auto Differential; Future  -     Comprehensive Metabolic Panel; Future  -     Hemoglobin A1C; Future  -     Lipid Panel; Future  -     TSH without Reflex;  Future  -     Microalbumin / Creatinine Urine  Hemoglobin A1C     Fast 12 hours     Standing Status:   Future     Standing Expiration Date:   7/12/2019    Lipid Panel     Standing Status:   Future     Standing Expiration Date:   7/12/2019     Order Specific Question:   Is Patient Fasting?/# of Hours     Answer:   12    TSH without Reflex     Fast 12 hours     Standing Status:   Future     Standing Expiration Date:   7/12/2019    Microalbumin / Creatinine Urine Ratio     Standing Status:   Future     Standing Expiration Date:   7/12/2019    Vitamin D 25 Hydrox, D2 & D3     Standing Status:   Future     Standing Expiration Date:   7/12/2019       Te Mcadams MD

## 2018-10-27 ENCOUNTER — HOSPITAL ENCOUNTER (EMERGENCY)
Age: 68
Discharge: HOME | End: 2018-10-27
Payer: MEDICARE

## 2018-10-27 ENCOUNTER — APPOINTMENT (OUTPATIENT)
Dept: GENERAL RADIOLOGY | Age: 68
End: 2018-10-27
Payer: MEDICARE

## 2018-10-27 VITALS
RESPIRATION RATE: 16 BRPM | OXYGEN SATURATION: 96 % | BODY MASS INDEX: 34.53 KG/M2 | HEIGHT: 67 IN | TEMPERATURE: 98.3 F | DIASTOLIC BLOOD PRESSURE: 95 MMHG | HEART RATE: 75 BPM | WEIGHT: 220 LBS | SYSTOLIC BLOOD PRESSURE: 197 MMHG

## 2018-10-27 DIAGNOSIS — S62.336A CLOSED DISPLACED FRACTURE OF NECK OF FIFTH METACARPAL BONE OF RIGHT HAND, INITIAL ENCOUNTER: Primary | ICD-10-CM

## 2018-10-27 PROCEDURE — 6360000002 HC RX W HCPCS: Performed by: NURSE PRACTITIONER

## 2018-10-27 PROCEDURE — 96372 THER/PROPH/DIAG INJ SC/IM: CPT

## 2018-10-27 PROCEDURE — 29125 APPL SHORT ARM SPLINT STATIC: CPT

## 2018-10-27 PROCEDURE — 99283 EMERGENCY DEPT VISIT LOW MDM: CPT | Performed by: NURSE PRACTITIONER

## 2018-10-27 PROCEDURE — 26600 TREAT METACARPAL FRACTURE: CPT | Performed by: NURSE PRACTITIONER

## 2018-10-27 PROCEDURE — 99283 EMERGENCY DEPT VISIT LOW MDM: CPT

## 2018-10-27 PROCEDURE — 73130 X-RAY EXAM OF HAND: CPT

## 2018-10-27 RX ORDER — ONDANSETRON 4 MG/1
4 TABLET, ORALLY DISINTEGRATING ORAL ONCE
Status: COMPLETED | OUTPATIENT
Start: 2018-10-27 | End: 2018-10-27

## 2018-10-27 RX ORDER — HYDROMORPHONE HCL 110MG/55ML
1 PATIENT CONTROLLED ANALGESIA SYRINGE INTRAVENOUS ONCE
Status: COMPLETED | OUTPATIENT
Start: 2018-10-27 | End: 2018-10-27

## 2018-10-27 RX ADMIN — HYDROMORPHONE HYDROCHLORIDE 1 MG: 2 INJECTION INTRAMUSCULAR; INTRAVENOUS; SUBCUTANEOUS at 20:58

## 2018-10-27 RX ADMIN — ONDANSETRON 4 MG: 4 TABLET, ORALLY DISINTEGRATING ORAL at 20:58

## 2018-10-27 ASSESSMENT — PAIN SCALES - GENERAL
PAINLEVEL_OUTOF10: 8
PAINLEVEL_OUTOF10: 8

## 2018-10-27 ASSESSMENT — PAIN DESCRIPTION - PAIN TYPE: TYPE: ACUTE PAIN

## 2018-10-27 ASSESSMENT — PAIN DESCRIPTION - LOCATION: LOCATION: HAND

## 2018-10-27 ASSESSMENT — PAIN DESCRIPTION - ORIENTATION: ORIENTATION: RIGHT

## 2018-10-28 NOTE — ED PROVIDER NOTES
of this dictation. EMERGENCY DEPARTMENT COURSE and DIFFERENTIALDIAGNOSIS/MDM:   Vitals:    Vitals:    10/27/18 1959 10/27/18 2000 10/27/18 2001   BP:   (!) 197/95   Pulse:  75    Resp:  16    Temp: 98.3 °F (36.8 °C)     TempSrc: Temporal     SpO2:  96%    Weight: 220 lb (99.8 kg)     Height: 5' 7\" (1.702 m)             MDM texted xray to Dr Richy Levine. Will do ulnar gutter and have her follow up in the office next week. Pt expressed understanding      CONSULTS:  None    PROCEDURES:  Unless otherwise noted below, none     Splint Application  Date/Time: 10/27/2018 9:28 PM  Performed by: Nemo Carmona by: Rory Rosa     Consent:     Consent obtained:  Verbal    Consent given by:  Patient  Pre-procedure details:     Sensation:  Normal    Skin color:  Normal  Procedure details:     Laterality:  Right    Location:  Hand    Hand:  R hand    Splint type:  Ulnar gutter    Supplies:  Elastic bandage, Ortho-Glass and cotton padding  Post-procedure details:     Pain:  Unchanged    Sensation:  Normal    Skin color:  Normal    Patient tolerance of procedure: Tolerated well, no immediate complications        FINAL IMPRESSION      1.  Closed displaced fracture of neck of fifth metacarpal bone of right hand, initial encounter        DISPOSITION/PLAN   DISPOSITION        PATIENT REFERRED TO:  MD Teena Ant[address]z [address]  [phone]            DISCHARGE MEDICATIONS:  Discharge Medication List as of 10/27/2018  9:44 PM             (Please note that portions of this note were completed with a voice recognitionprogram.  Efforts were made to edit the dictations but occasionally words are mis-transcribed.)    CHANELLE Middleton (electronically signed)          CHANELLE Middleton  10/28/18 [id number]

## 2018-11-28 ENCOUNTER — TELEPHONE (OUTPATIENT)
Dept: INTERNAL MEDICINE | Age: 68
End: 2018-11-28

## 2018-12-12 ENCOUNTER — TELEPHONE (OUTPATIENT)
Dept: INTERNAL MEDICINE | Age: 68
End: 2018-12-12

## 2018-12-14 ENCOUNTER — NURSE ONLY (OUTPATIENT)
Dept: INTERNAL MEDICINE | Age: 68
End: 2018-12-14
Payer: MEDICARE

## 2018-12-14 DIAGNOSIS — I82.4Y9 DVT, LOWER EXTREMITY, PROXIMAL, ACUTE, UNSPECIFIED LATERALITY (HCC): Primary | ICD-10-CM

## 2018-12-14 LAB
INTERNATIONAL NORMALIZATION RATIO, POC: 1
PROTHROMBIN TIME, POC: NORMAL

## 2018-12-14 PROCEDURE — 85610 PROTHROMBIN TIME: CPT | Performed by: INTERNAL MEDICINE

## 2018-12-14 RX ORDER — WARFARIN SODIUM 7.5 MG/1
TABLET ORAL
Qty: 150 TABLET | Refills: 3 | Status: SHIPPED | OUTPATIENT
Start: 2018-12-14 | End: 2019-01-24 | Stop reason: SDUPTHER

## 2018-12-19 ENCOUNTER — NURSE ONLY (OUTPATIENT)
Dept: INTERNAL MEDICINE | Age: 68
End: 2018-12-19
Payer: MEDICARE

## 2018-12-19 DIAGNOSIS — I82.4Y9 DVT, LOWER EXTREMITY, PROXIMAL, ACUTE, UNSPECIFIED LATERALITY (HCC): Primary | ICD-10-CM

## 2018-12-19 LAB
INTERNATIONAL NORMALIZATION RATIO, POC: 1.1
PROTHROMBIN TIME, POC: NORMAL

## 2018-12-19 PROCEDURE — 85610 PROTHROMBIN TIME: CPT | Performed by: INTERNAL MEDICINE

## 2018-12-21 ENCOUNTER — TELEPHONE (OUTPATIENT)
Dept: INTERNAL MEDICINE | Age: 68
End: 2018-12-21

## 2018-12-21 NOTE — TELEPHONE ENCOUNTER
Called primary care pharmacy to see WHY they keep sending the same form over and over again after it has been correctly and completely filled out   I addressed my concern that they have no idea what they are doing, and someone needed to call our office back to see what the issue is.

## 2018-12-27 ENCOUNTER — NURSE ONLY (OUTPATIENT)
Dept: INTERNAL MEDICINE | Age: 68
End: 2018-12-27
Payer: MEDICARE

## 2018-12-27 DIAGNOSIS — I82.4Y9 DVT, LOWER EXTREMITY, PROXIMAL, ACUTE, UNSPECIFIED LATERALITY (HCC): Primary | ICD-10-CM

## 2018-12-27 LAB
INTERNATIONAL NORMALIZATION RATIO, POC: 1.6
PROTHROMBIN TIME, POC: NORMAL

## 2018-12-27 PROCEDURE — 85610 PROTHROMBIN TIME: CPT | Performed by: INTERNAL MEDICINE

## 2019-01-04 ENCOUNTER — TELEPHONE (OUTPATIENT)
Dept: CARDIOLOGY | Facility: CLINIC | Age: 69
End: 2019-01-04

## 2019-01-08 DIAGNOSIS — I82.4Y9 DVT, LOWER EXTREMITY, PROXIMAL, ACUTE, UNSPECIFIED LATERALITY (HCC): ICD-10-CM

## 2019-01-08 DIAGNOSIS — E11.649 UNCONTROLLED TYPE 2 DIABETES MELLITUS WITH HYPOGLYCEMIA, UNSPECIFIED HYPOGLYCEMIA COMA STATUS (HCC): ICD-10-CM

## 2019-01-08 DIAGNOSIS — E55.9 VITAMIN D DEFICIENCY: ICD-10-CM

## 2019-01-08 LAB
ALBUMIN SERPL-MCNC: 3.9 G/DL (ref 3.5–5.2)
ALP BLD-CCNC: 93 U/L (ref 35–104)
ALT SERPL-CCNC: 10 U/L (ref 5–33)
ANION GAP SERPL CALCULATED.3IONS-SCNC: 13 MMOL/L (ref 7–19)
AST SERPL-CCNC: 19 U/L (ref 5–32)
BASOPHILS ABSOLUTE: 0 K/UL (ref 0–0.2)
BASOPHILS RELATIVE PERCENT: 0.4 % (ref 0–1)
BILIRUB SERPL-MCNC: 0.4 MG/DL (ref 0.2–1.2)
BUN BLDV-MCNC: 31 MG/DL (ref 8–23)
CALCIUM SERPL-MCNC: 9 MG/DL (ref 8.8–10.2)
CHLORIDE BLD-SCNC: 111 MMOL/L (ref 98–111)
CHOLESTEROL, TOTAL: 211 MG/DL (ref 160–199)
CO2: 20 MMOL/L (ref 22–29)
CREAT SERPL-MCNC: 1.2 MG/DL (ref 0.5–0.9)
CREATININE URINE: 255.8 MG/DL (ref 4.2–622)
EOSINOPHILS ABSOLUTE: 0.2 K/UL (ref 0–0.6)
EOSINOPHILS RELATIVE PERCENT: 3.1 % (ref 0–5)
GFR NON-AFRICAN AMERICAN: 44
GLUCOSE BLD-MCNC: 78 MG/DL (ref 74–109)
HBA1C MFR BLD: 5.8 % (ref 4–6)
HCT VFR BLD CALC: 36.5 % (ref 37–47)
HDLC SERPL-MCNC: 84 MG/DL (ref 65–121)
HEMOGLOBIN: 11.3 G/DL (ref 12–16)
INR BLD: 1.61 (ref 0.88–1.18)
LDL CHOLESTEROL CALCULATED: 110 MG/DL
LYMPHOCYTES ABSOLUTE: 1.7 K/UL (ref 1.1–4.5)
LYMPHOCYTES RELATIVE PERCENT: 34.9 % (ref 20–40)
MCH RBC QN AUTO: 27.6 PG (ref 27–31)
MCHC RBC AUTO-ENTMCNC: 31 G/DL (ref 33–37)
MCV RBC AUTO: 89.2 FL (ref 81–99)
MICROALBUMIN UR-MCNC: <1.2 MG/DL (ref 0–19)
MICROALBUMIN/CREAT UR-RTO: NORMAL MG/G
MONOCYTES ABSOLUTE: 0.6 K/UL (ref 0–0.9)
MONOCYTES RELATIVE PERCENT: 12.7 % (ref 0–10)
NEUTROPHILS ABSOLUTE: 2.3 K/UL (ref 1.5–7.5)
NEUTROPHILS RELATIVE PERCENT: 48.7 % (ref 50–65)
PDW BLD-RTO: 16.3 % (ref 11.5–14.5)
PLATELET # BLD: 139 K/UL (ref 130–400)
PMV BLD AUTO: 12.8 FL (ref 9.4–12.3)
POTASSIUM SERPL-SCNC: 3.9 MMOL/L (ref 3.5–5)
PROTHROMBIN TIME: 18.4 SEC (ref 12–14.6)
RBC # BLD: 4.09 M/UL (ref 4.2–5.4)
SODIUM BLD-SCNC: 144 MMOL/L (ref 136–145)
TOTAL PROTEIN: 8 G/DL (ref 6.6–8.7)
TRIGL SERPL-MCNC: 85 MG/DL (ref 0–149)
TSH SERPL DL<=0.05 MIU/L-ACNC: 5.37 UIU/ML (ref 0.27–4.2)
WBC # BLD: 4.8 K/UL (ref 4.8–10.8)

## 2019-01-11 LAB
VITAMIN D2 AND D3, TOTAL: 22.3 NG/ML (ref 30–80)
VITAMIN D2, 25 HYDROXY: 17 NG/ML
VITAMIN D3,25 HYDROXY: 5.3 NG/ML

## 2019-01-12 NOTE — PROGRESS NOTES
[facility]  [address] [facility] [address]  [address] [place] [address]  Dept: [phone]  Dept Fax: [phone]: [phone]      [facility] INTERNAL MEDICINE  OFFICE NOTE      Chief Complaint   Patient presents with    Hypertension    Other     back pain bc pt fell when it was snowing outside        Reuben Cesar is a 75y.o. year old female who is seen for evaluation of Blood pressure and lower back pain. Patient states she was seen 2 days ago and Dr. Huy Gallo office her neurologist and was told that her blood pressure was elevated and that she needed see her primary care provider. Patient states in office it was about 170 over the 100. Patient states she has not taken her medication today. Patient's currently taking  Hyzaar 100/25 mg  Daily and Norvasc 5 mg daily and clonidine 0.1 mg up to 2 times a day  As needed for blood pressure greater than 160/90. Patient denies any chest pain or shortness breath. Patient also states that she fell  A week ago Friday when she slipped on the snow and ice. Patient states she still has some lower back pain going across her lower back. Patient states cannot take any pain medication. Patient states has been taking Tylenol with very mild relief. Patient states the pain runs down into her legs. Patient denies any bowel or bladder problems or weakness and numbness in the legs.     Active Ambulatory Problems     Diagnosis Date Noted    Vomiting 05/02/2014    Burping 05/02/2014    GERD (gastroesophageal reflux disease) 05/02/2014    History of gastric bypass 05/02/2014    Family history of colon cancer 05/02/2014    Bloating 05/02/2014    Enuresis 08/28/2014    Nausea and vomiting     Colon cancer screening     Stable angina [facility])      Resolved Ambulatory Problems     Diagnosis Date Noted    No Resolved Ambulatory Problems     Past Medical History:   Diagnosis Date    Anemia     Lyons's esophagus     Bowel obstruction     Depression     Diabetes mellitus type 2 in nonobese ([facility]     DVT (deep venous thrombosis) (HCC)     Fibromyalgia     Gastric ulcer     Hypertension     Hypothyroidism     Iron deficiency     Lupus     Stomach ulcer        Past Medical History:   Diagnosis Date    Anemia     Lyons's esophagus     Bowel obstruction     Depression     Diabetes mellitus type 2 in nonobese ([facility]     DVT (deep venous thrombosis) (HCC)     Fibromyalgia     Gastric ulcer     Hypertension     Hypothyroidism     Iron deficiency     Lupus     Stomach ulcer        Prior to Visit Medications    Medication Sig Taking? Authorizing Provider   tiZANidine (ZANAFLEX) 4 MG tablet Take 1 tablet by mouth every 6 hours as needed (spasm) Yes TWAN Espana   amLODIPine (NORVASC) 10 MG tablet Take 1 tablet by mouth daily Yes TWAN Espana   warfarin (COUMADIN) 5 MG tablet Take 2 tablets by mouth daily Take two tablets daily Yes Davin Elliott MD   Gabapentin, Once-Daily, 300 MG TABS Take 300 mg by mouth 2 times daily as needed (leg pain). Yes Davin Elliott MD   bumetanide (BUMEX) 0.5 MG tablet Take 1 tablet by mouth 2 times daily Yes Davin Elliott MD   rOPINIRole (REQUIP) 0.5 MG tablet Take 1 tablet by mouth nightly as needed (leg cramps) Yes Davin Elliott MD   clobetasol (TEMOVATE) 0.05 % cream Apply topically 2 times daily.  Yes Davin Elliott MD   ondansetron (ZOFRAN) 4 MG tablet Take 1 tablet by mouth every 8 hours as needed for Nausea or Vomiting Yes Davin Elliott MD   pantoprazole (PROTONIX) 40 MG tablet Take 1 tablet by mouth daily Yes Davin Elliott MD   Ergocalciferol (VITAMIN D2 PO) Take 50,000 Units by mouth Yes Historical Provider, MD   sucralfate (CARAFATE) 1 GM/10ML suspension Take 1 g by mouth 4 times daily Yes Historical Provider, MD   escitalopram (LEXAPRO) 10 MG tablet  Yes Historical Provider, MD   ranitidine (ZANTAC) 150 MG tablet  Yes Historical Provider, MD tracheal deviation present. Cardiovascular: Normal rate, regular rhythm and normal heart sounds. Exam reveals no gallop and no friction rub. No murmur heard. Pulmonary/Chest: Effort normal and breath sounds normal. No respiratory distress. She has no wheezes. She has no rales. She exhibits no tenderness. Abdominal: Soft. There is no tenderness. There is no rebound and no guarding. Musculoskeletal: Normal range of motion. She exhibits no tenderness or deformity. Lymphadenopathy:     She has no cervical adenopathy. Neurological: She is alert. She has normal strength. Skin: Skin is warm, dry and intact. No lesion and no rash noted. No erythema. Psychiatric: She has a normal mood and affect. Her mood appears not anxious. She does not exhibit a depressed mood. Nursing note and vitals reviewed. ASSESSMENT      ICD-10-CM ICD-9-CM    1. Acute bilateral low back pain with bilateral sciatica M54.42 724.2 [facility]    M54.41 724.3 tiZANidine (ZANAFLEX) 4 MG tablet   2. Essential hypertension I10 401.9 amLODIPine (NORVASC) 10 MG tablet   3. Need for prophylactic vaccination against Streptococcus pneumoniae (pneumococcus) Z23 V03.82 Pneumococcal conjugate vaccine 13-valent PCV13       PLAN  Discussed with patient the importance of compliance with taking medication. Patient states will take the medication she gets home. Patient has a history of noncompliance with taking medications. We'll increase patient's Norvasc to 10 mg from the 5 mg. Patient will continue Hyzaar 100/25. Patient will continue to take clonidine 0.1 mg as needed for blood pressure greater than 160/90. Discussed with patient the importance of taking her medications to control her blood pressure to prevent heart disease and heart attack and stroke. We'll go and start patient on Zanaflex 4 mg up to every 6 hours as needed for muscle spasms in the back and the legs.   We'll also send patient to physical Attending Attestation (For Attendings USE Only)...

## 2019-01-14 ENCOUNTER — ANTI-COAG VISIT (OUTPATIENT)
Dept: INTERNAL MEDICINE | Age: 69
End: 2019-01-14
Payer: MEDICARE

## 2019-01-14 DIAGNOSIS — I82.4Y9 DVT, LOWER EXTREMITY, PROXIMAL, ACUTE, UNSPECIFIED LATERALITY (HCC): Primary | ICD-10-CM

## 2019-01-14 LAB
INTERNATIONAL NORMALIZATION RATIO, POC: 1.7
PROTHROMBIN TIME, POC: NORMAL

## 2019-01-14 PROCEDURE — 85610 PROTHROMBIN TIME: CPT | Performed by: INTERNAL MEDICINE

## 2019-01-15 ENCOUNTER — CLINICAL SUPPORT (OUTPATIENT)
Dept: CARDIOLOGY | Facility: CLINIC | Age: 69
End: 2019-01-15

## 2019-01-15 DIAGNOSIS — Z95.0 PACEMAKER: ICD-10-CM

## 2019-01-15 PROCEDURE — 93294 REM INTERROG EVL PM/LDLS PM: CPT | Performed by: PHYSICIAN ASSISTANT

## 2019-01-15 PROCEDURE — 93296 REM INTERROG EVL PM/IDS: CPT | Performed by: PHYSICIAN ASSISTANT

## 2019-01-17 NOTE — PROGRESS NOTES
Dual Chamber Pacemaker Evaluation Report  [facility]    January 17, 2019    Primary Cardiologist: Mynor  : Medtronic Model: EnRhythm  Implant date: 11/13/09    Reason for evaluation: routine  Indication for pacemaker: sick sinus syndrome    Measurements  Atrial sensing - P wave: 2.3 mV  Atrial threshold: n/r  Atrial lead impedance: 496 ohms  Ventricular sensing - R wave: 4.7 mV  Ventricular threshold: n/r  Ventricular lead impedance:   488 ohms     Diagnostic Data  Atrial paced: 87.5 %  Ventricular paced: 0.8 %  Other: Multiple episodes of SVT noted.  All in the 1-2 sec range.  Battery status: satisfactory         Final Parameters  Mode:  AAIR+  Lower rate: 60 bpm   Upper rate: 130 bpm  AV Delay: paced- 180 ms  Sensed-150 ms  Atrial - Amplitude: 2.0 V   Pulse width: 0.4 ms   Sensitivity: 0.6 mV     Ventricular - Amplitude: 3.0 V  Pulse width: 0.4 ms  Sensitivity: 0.9 mV    Changes made: n/a  Conclusions: normal pacemaker function    Follow up: 3 months

## 2019-01-24 ENCOUNTER — NURSE ONLY (OUTPATIENT)
Dept: INTERNAL MEDICINE | Age: 69
End: 2019-01-24
Payer: MEDICARE

## 2019-01-24 ENCOUNTER — OFFICE VISIT (OUTPATIENT)
Dept: INTERNAL MEDICINE | Age: 69
End: 2019-01-24
Payer: MEDICARE

## 2019-01-24 VITALS
DIASTOLIC BLOOD PRESSURE: 90 MMHG | HEIGHT: 67 IN | WEIGHT: 227 LBS | BODY MASS INDEX: 35.63 KG/M2 | OXYGEN SATURATION: 99 % | SYSTOLIC BLOOD PRESSURE: 152 MMHG | HEART RATE: 63 BPM

## 2019-01-24 DIAGNOSIS — M79.7 FIBROMYALGIA: ICD-10-CM

## 2019-01-24 DIAGNOSIS — J98.4 RESTRICTIVE AIRWAY DISEASE: ICD-10-CM

## 2019-01-24 DIAGNOSIS — E53.8 LOW VITAMIN B12 LEVEL: ICD-10-CM

## 2019-01-24 DIAGNOSIS — H35.029: ICD-10-CM

## 2019-01-24 DIAGNOSIS — E03.9 HYPOTHYROIDISM, UNSPECIFIED TYPE: ICD-10-CM

## 2019-01-24 DIAGNOSIS — D68.69 SECONDARY HYPERCOAGULABILITY DISORDER (HCC): ICD-10-CM

## 2019-01-24 DIAGNOSIS — I10 ESSENTIAL HYPERTENSION: Primary | ICD-10-CM

## 2019-01-24 DIAGNOSIS — F32.89 OTHER DEPRESSION: ICD-10-CM

## 2019-01-24 DIAGNOSIS — E11.9 TYPE 2 DIABETES MELLITUS WITHOUT COMPLICATION, UNSPECIFIED WHETHER LONG TERM INSULIN USE (HCC): ICD-10-CM

## 2019-01-24 DIAGNOSIS — K21.9 GASTROESOPHAGEAL REFLUX DISEASE WITHOUT ESOPHAGITIS: ICD-10-CM

## 2019-01-24 DIAGNOSIS — G62.9 NEUROPATHY: ICD-10-CM

## 2019-01-24 DIAGNOSIS — I82.4Y9 DVT, LOWER EXTREMITY, PROXIMAL, ACUTE, UNSPECIFIED LATERALITY (HCC): Primary | ICD-10-CM

## 2019-01-24 LAB
INTERNATIONAL NORMALIZATION RATIO, POC: 1.9
PROTHROMBIN TIME, POC: NORMAL

## 2019-01-24 PROCEDURE — 96372 THER/PROPH/DIAG INJ SC/IM: CPT | Performed by: INTERNAL MEDICINE

## 2019-01-24 PROCEDURE — 3017F COLORECTAL CA SCREEN DOC REV: CPT | Performed by: INTERNAL MEDICINE

## 2019-01-24 PROCEDURE — 4040F PNEUMOC VAC/ADMIN/RCVD: CPT | Performed by: INTERNAL MEDICINE

## 2019-01-24 PROCEDURE — 1036F TOBACCO NON-USER: CPT | Performed by: INTERNAL MEDICINE

## 2019-01-24 PROCEDURE — G8427 DOCREV CUR MEDS BY ELIG CLIN: HCPCS | Performed by: INTERNAL MEDICINE

## 2019-01-24 PROCEDURE — 3044F HG A1C LEVEL LT 7.0%: CPT | Performed by: INTERNAL MEDICINE

## 2019-01-24 PROCEDURE — G8400 PT W/DXA NO RESULTS DOC: HCPCS | Performed by: INTERNAL MEDICINE

## 2019-01-24 PROCEDURE — 2022F DILAT RTA XM EVC RTNOPTHY: CPT | Performed by: INTERNAL MEDICINE

## 2019-01-24 PROCEDURE — 85610 PROTHROMBIN TIME: CPT | Performed by: INTERNAL MEDICINE

## 2019-01-24 PROCEDURE — 99214 OFFICE O/P EST MOD 30 MIN: CPT | Performed by: INTERNAL MEDICINE

## 2019-01-24 PROCEDURE — G8482 FLU IMMUNIZE ORDER/ADMIN: HCPCS | Performed by: INTERNAL MEDICINE

## 2019-01-24 PROCEDURE — 1101F PT FALLS ASSESS-DOCD LE1/YR: CPT | Performed by: INTERNAL MEDICINE

## 2019-01-24 PROCEDURE — 1123F ACP DISCUSS/DSCN MKR DOCD: CPT | Performed by: INTERNAL MEDICINE

## 2019-01-24 PROCEDURE — G8598 ASA/ANTIPLAT THER USED: HCPCS | Performed by: INTERNAL MEDICINE

## 2019-01-24 PROCEDURE — G8417 CALC BMI ABV UP PARAM F/U: HCPCS | Performed by: INTERNAL MEDICINE

## 2019-01-24 PROCEDURE — 1090F PRES/ABSN URINE INCON ASSESS: CPT | Performed by: INTERNAL MEDICINE

## 2019-01-24 RX ORDER — RANITIDINE 150 MG/1
150 TABLET ORAL 2 TIMES DAILY
COMMUNITY
End: 2019-01-24 | Stop reason: SDUPTHER

## 2019-01-24 RX ORDER — CYANOCOBALAMIN 1000 UG/ML
1000 INJECTION INTRAMUSCULAR; SUBCUTANEOUS ONCE
Status: COMPLETED | OUTPATIENT
Start: 2019-01-24 | End: 2019-01-24

## 2019-01-24 RX ORDER — CYANOCOBALAMIN 1000 UG/ML
1000 INJECTION INTRAMUSCULAR; SUBCUTANEOUS ONCE
Qty: 1 ML | Refills: 0 | Status: SHIPPED | OUTPATIENT
Start: 2019-01-24 | End: 2020-07-06 | Stop reason: SDUPTHER

## 2019-01-24 RX ORDER — RANITIDINE 150 MG/1
150 TABLET ORAL 2 TIMES DAILY
Qty: 60 TABLET | Refills: 5 | Status: SHIPPED | OUTPATIENT
Start: 2019-01-24 | End: 2020-01-13 | Stop reason: ALTCHOICE

## 2019-01-24 RX ORDER — HYDROMORPHONE HYDROCHLORIDE 2 MG/1
2 TABLET ORAL
COMMUNITY
End: 2019-06-07

## 2019-01-24 RX ORDER — ESCITALOPRAM OXALATE 10 MG/1
10 TABLET ORAL DAILY
Qty: 90 TABLET | Refills: 3 | Status: SHIPPED | OUTPATIENT
Start: 2019-01-24 | End: 2020-01-13

## 2019-01-24 RX ORDER — ALBUTEROL SULFATE 90 UG/1
2 AEROSOL, METERED RESPIRATORY (INHALATION) EVERY 6 HOURS PRN
Qty: 3 INHALER | Refills: 3 | Status: SHIPPED | OUTPATIENT
Start: 2019-01-24 | End: 2019-06-07

## 2019-01-24 RX ORDER — CLONIDINE HYDROCHLORIDE 0.1 MG/1
0.1 TABLET ORAL 2 TIMES DAILY
COMMUNITY
End: 2019-01-24 | Stop reason: SDUPTHER

## 2019-01-24 RX ORDER — WARFARIN SODIUM 7.5 MG/1
TABLET ORAL
Qty: 150 TABLET | Refills: 3 | Status: ON HOLD | OUTPATIENT
Start: 2019-01-24 | End: 2020-01-28 | Stop reason: SDUPTHER

## 2019-01-24 RX ORDER — CLONIDINE HYDROCHLORIDE 0.1 MG/1
0.1 TABLET ORAL 2 TIMES DAILY
Qty: 60 TABLET | Refills: 3 | Status: SHIPPED
Start: 2019-01-24 | End: 2020-03-06 | Stop reason: CLARIF

## 2019-01-24 RX ORDER — LEVOTHYROXINE SODIUM 0.1 MG/1
100 TABLET ORAL DAILY
Qty: 90 TABLET | Refills: 3 | Status: SHIPPED | OUTPATIENT
Start: 2019-01-24 | End: 2020-01-13 | Stop reason: SDUPTHER

## 2019-01-24 RX ORDER — CYANOCOBALAMIN 1000 UG/ML
1000 INJECTION INTRAMUSCULAR; SUBCUTANEOUS ONCE
COMMUNITY
End: 2019-01-24 | Stop reason: SDUPTHER

## 2019-01-24 RX ORDER — ERGOCALCIFEROL (VITAMIN D2) 1250 MCG
50000 CAPSULE ORAL
Qty: 12 CAPSULE | Refills: 3 | Status: SHIPPED | OUTPATIENT
Start: 2019-01-24 | End: 2019-09-13

## 2019-01-24 RX ORDER — POTASSIUM CHLORIDE 750 MG/1
10 TABLET, EXTENDED RELEASE ORAL DAILY
Qty: 90 TABLET | Refills: 3 | Status: SHIPPED | OUTPATIENT
Start: 2019-01-24 | End: 2020-01-13 | Stop reason: SDUPTHER

## 2019-01-24 RX ORDER — PANTOPRAZOLE SODIUM 40 MG/1
40 GRANULE, DELAYED RELEASE ORAL
Qty: 30 EACH | Refills: 3 | Status: SHIPPED | OUTPATIENT
Start: 2019-01-24 | End: 2020-01-13 | Stop reason: SDUPTHER

## 2019-01-24 RX ORDER — BUMETANIDE 1 MG/1
1 TABLET ORAL DAILY
Qty: 90 TABLET | Refills: 3 | Status: SHIPPED | OUTPATIENT
Start: 2019-01-24 | End: 2019-07-02 | Stop reason: ALTCHOICE

## 2019-01-24 RX ORDER — LISINOPRIL 20 MG/1
20 TABLET ORAL DAILY
Qty: 90 TABLET | Refills: 3 | Status: SHIPPED | OUTPATIENT
Start: 2019-01-24 | End: 2019-11-01

## 2019-01-24 RX ORDER — PANTOPRAZOLE SODIUM 40 MG/1
40 GRANULE, DELAYED RELEASE ORAL
COMMUNITY
End: 2019-01-24 | Stop reason: SDUPTHER

## 2019-01-24 RX ADMIN — CYANOCOBALAMIN 1000 MCG: 1000 INJECTION INTRAMUSCULAR; SUBCUTANEOUS at 08:28

## 2019-01-24 ASSESSMENT — ENCOUNTER SYMPTOMS
COLOR CHANGE: 0
ABDOMINAL PAIN: 1
CHEST TIGHTNESS: 0
SHORTNESS OF BREATH: 0
SINUS PRESSURE: 0
ABDOMINAL DISTENTION: 0
STRIDOR: 0
SINUS PAIN: 0
APNEA: 0
COUGH: 0
EYE ITCHING: 0
WHEEZING: 0
BLOOD IN STOOL: 0
DIARRHEA: 0
SORE THROAT: 0
TROUBLE SWALLOWING: 0
RHINORRHEA: 0
BACK PAIN: 1
VOICE CHANGE: 0
EYE DISCHARGE: 0
VOMITING: 0
CONSTIPATION: 0

## 2019-02-04 ENCOUNTER — PATIENT MESSAGE (OUTPATIENT)
Dept: INTERNAL MEDICINE | Age: 69
End: 2019-02-04

## 2019-02-07 ENCOUNTER — NURSE ONLY (OUTPATIENT)
Dept: INTERNAL MEDICINE | Age: 69
End: 2019-02-07
Payer: MEDICARE

## 2019-02-07 DIAGNOSIS — I82.4Y9 DVT, LOWER EXTREMITY, PROXIMAL, ACUTE, UNSPECIFIED LATERALITY (HCC): Primary | ICD-10-CM

## 2019-02-07 LAB
INTERNATIONAL NORMALIZATION RATIO, POC: 1.1
PROTHROMBIN TIME, POC: NORMAL

## 2019-02-07 PROCEDURE — 85610 PROTHROMBIN TIME: CPT | Performed by: INTERNAL MEDICINE

## 2019-02-12 ENCOUNTER — PATIENT MESSAGE (OUTPATIENT)
Dept: BARIATRICS/WEIGHT MGMT | Facility: CLINIC | Age: 69
End: 2019-02-12

## 2019-02-12 ENCOUNTER — OFFICE VISIT (OUTPATIENT)
Dept: CARDIOLOGY | Facility: CLINIC | Age: 69
End: 2019-02-12

## 2019-02-12 VITALS
HEIGHT: 67 IN | SYSTOLIC BLOOD PRESSURE: 190 MMHG | HEART RATE: 64 BPM | BODY MASS INDEX: 35.79 KG/M2 | DIASTOLIC BLOOD PRESSURE: 120 MMHG | RESPIRATION RATE: 18 BRPM | WEIGHT: 228 LBS | OXYGEN SATURATION: 98 %

## 2019-02-12 DIAGNOSIS — I47.1 PAROXYSMAL SVT (SUPRAVENTRICULAR TACHYCARDIA) (HCC): ICD-10-CM

## 2019-02-12 DIAGNOSIS — I10 HYPERTENSION, UNCONTROLLED: ICD-10-CM

## 2019-02-12 DIAGNOSIS — R06.02 SHORTNESS OF BREATH: Primary | ICD-10-CM

## 2019-02-12 DIAGNOSIS — D68.62 LUPUS ANTICOAGULANT DISORDER (HCC): ICD-10-CM

## 2019-02-12 DIAGNOSIS — E66.01 MORBID OBESITY DUE TO EXCESS CALORIES (HCC): ICD-10-CM

## 2019-02-12 DIAGNOSIS — Z95.0 PACEMAKER: ICD-10-CM

## 2019-02-12 PROCEDURE — 99214 OFFICE O/P EST MOD 30 MIN: CPT | Performed by: NURSE PRACTITIONER

## 2019-02-12 PROCEDURE — 93000 ELECTROCARDIOGRAM COMPLETE: CPT | Performed by: NURSE PRACTITIONER

## 2019-02-12 NOTE — PROGRESS NOTES
Subjective:     Encounter Date:02/12/2019      Patient ID: Veronica Stewart is a 68 y.o. female.    Chief Complaint:  Leg Swelling   This is a chronic problem. The current episode started more than 1 year ago. The problem occurs intermittently. The problem has been unchanged.   Shortness of Breath   This is a recurrent problem. The current episode started more than 1 year ago. The problem occurs intermittently. Associated symptoms include leg swelling. Pertinent negatives include no hemoptysis, orthopnea, PND or syncope. She has tried beta agonist inhalers for the symptoms. The treatment provided significant relief.   Hypertension   This is a chronic problem. The current episode started more than 1 year ago. The problem is uncontrolled. Associated symptoms include malaise/fatigue and shortness of breath. Pertinent negatives include no orthopnea, palpitations or PND. Current antihypertension treatment includes ACE inhibitors. The current treatment provides mild improvement. Compliance problems include psychosocial issues.      Patient presents today for SVT found on device checks. The episodes are brief. Patient presents today and is hypertensive- however patient admits that she has not taken her medications. In review she saw her PCP last month and it was found that patient had been off of her medications for several months- she reports issues with getting her medications. Her complaints today are fatigue, shortness of breath and leg swelling. These seem like chronic problems for her. She does states she was recently started on inhaler with improvement of symptoms. She does reports she feels palpitations at times. She denies chest pain. She has history of DVT and chronic leg swelling- she reports at her baseline. She has a history of lupus anticoagulation disorder. She is followed by our office for pacemaker and history of tachy-redd syndrome. She has a history of resistant hypertension.    The following portions of  the patient's history were reviewed and updated as appropriate: allergies, current medications, past family history, past medical history, past social history, past surgical history and problem list.   Prior to Admission medications    Medication Sig Start Date End Date Taking? Authorizing Provider   ADVAIR DISKUS 250-50 MCG/DOSE DISKUS 2 (two) times a day. 8/12/16  Yes Sol Ramirez MD   amLODIPine (NORVASC) 5 MG tablet Take 5 mg by mouth Daily.   Yes Sol Ramirez MD   aspirin 81 MG EC tablet daily.   Yes Sol Ramirez MD   bumetanide (BUMEX) 1 MG tablet Take 1 mg by mouth Daily.   Yes Sol Ramirez MD   CARAFATE 1 GM/10ML suspension Take 10 mL by mouth 4 (Four) Times a Day. 5/10/17  Yes Zora Parr APRN   clobetasol (TEMOVATE) 0.05 % cream Apply  topically 2 (Two) Times a Day.   Yes Sol Ramirez MD   CloNIDine (CATAPRES) 0.1 MG tablet Take 0.1 mg by mouth 2 (Two) Times a Day As Needed.   Yes Sol Ramirez MD   cyanocobalamin 1000 MCG/ML injection Inject 1,000 mcg into the shoulder, thigh, or buttocks Every 28 (Twenty-Eight) Days.   Yes Sol Ramirez MD   ergocalciferol (ERGOCALCIFEROL) 21015 UNITS capsule Take 1 capsule by mouth 1 (One) Time Per Week. 4/10/17  Yes Zora Parr APRN   escitalopram (LEXAPRO) 10 MG tablet Take 10 mg by mouth Daily.   Yes Sol Ramirez MD   gabapentin (NEURONTIN) 300 MG capsule 3 (Three) Times a Day. 8/12/16  Yes Sol Ramirez MD   HYDROmorphone (DILAUDID) 2 MG tablet Take 2 mg by mouth Every 4 (Four) Hours As Needed for Moderate Pain .   Yes Sol Ramirez MD   levothyroxine (SYNTHROID, LEVOTHROID) 100 MCG tablet Take  by mouth.   Yes Sol Ramirez MD   lisinopril (PRINIVIL,ZESTRIL) 20 MG tablet Take 20 mg by mouth Daily.   Yes Sol Ramirez MD   Multiple Vitamins-Minerals (CENTRUM MULTIGUMMIES) chewable tablet Chew Daily.   Yes Sol Ramirez MD   ondansetron (ZOFRAN) 4  MG tablet Take 4 mg by mouth Every 8 (Eight) Hours As Needed for Nausea or Vomiting.   Yes Sol Ramirez MD   pantoprazole (PROTONIX) 40 MG EC tablet 2 (two) times a day. 8/12/16  Yes Sol Ramirez MD   potassium chloride (K-DUR,KLOR-CON) 10 MEQ CR tablet Take  by mouth. 3/22/17  Yes Sol Ramirez MD   raNITIdine (ZANTAC) 150 MG tablet Take 150 mg by mouth Every 12 (Twelve) Hours As Needed for Heartburn.   Yes Sol Ramirez MD   warfarin (COUMADIN) 5 MG tablet 7.5 mg Daily. Take 2 tabs daily  Managed by Dr. Jackson 6/17/16  Yes Sol Ramirez MD   metoprolol tartrate (LOPRESSOR) 25 MG tablet Take 1 tablet by mouth 2 (Two) Times a Day. 2/12/19   Kenneth Macario APRN   losartan-hydrochlorothiazide (HYZAAR) 100-25 MG per tablet Take 1 tablet by mouth Daily.  2/12/19  Sol Ramirez MD   rOPINIRole (REQUIP) 0.5 MG tablet Take 0.5 mg by mouth At Night As Needed. Take 1 hour before bedtime.  2/12/19  Sol Ramirez MD     Past Medical History:   Diagnosis Date   • Anxiety    • Redd-tachy syndrome (CMS/HCC)    • Bradycardia    • Breath shortness    • Cardiac pacemaker     11/09 MED [facility]   • Chest pain    • Chronic fatigue    • Depression    • Diabetes mellitus (CMS/HCC)    • Dizziness    • Fatigue    • Follow-up exam    • Heart burn    • Hypercoagulable state, primary (CMS/HCC)     LUPUS ANTI-COAG   • Hyperlipidemia    • Hypertension    • Liver disease    • Shortness of breath    • Sleep apnea    • Stroke (CMS/HCC)    • Syncope    • Tachy-redd syndrome (CMS/HCC)      Review of Systems   Constitution: Positive for malaise/fatigue. Negative for decreased appetite, weight gain and weight loss.   HENT: Negative for nosebleeds.    Eyes: Negative for visual disturbance.   Cardiovascular: Positive for leg swelling. Negative for dyspnea on exertion, near-syncope, orthopnea, palpitations, paroxysmal nocturnal dyspnea and syncope.   Respiratory: Positive for shortness of breath.  "Negative for hemoptysis and snoring.    Endocrine: Negative for cold intolerance and heat intolerance.   Hematologic/Lymphatic: Negative for bleeding problem. Does not bruise/bleed easily.   Musculoskeletal: Positive for arthritis and back pain. Negative for falls.   Gastrointestinal: Negative for constipation, diarrhea, heartburn and melena.   Genitourinary: Negative for hematuria.   Neurological: Negative for dizziness and light-headedness.   Psychiatric/Behavioral: Negative for altered mental status.   Allergic/Immunologic: Negative for persistent infections.         ECG 12 Lead  Date/Time: 2/12/2019 3:00 PM  Performed by: Kenneth Macario APRN  Authorized by: Kenneth Macario APRN   Comparison: compared with previous ECG from 5/4/2018  Rhythm: paced               Objective:     Physical Exam   Constitutional: She is oriented to person, place, and time. She appears well-developed and well-nourished.   HENT:   Head: Normocephalic and atraumatic.   Eyes: Pupils are equal, round, and reactive to light.   Neck: Normal range of motion. Neck supple. No JVD present. Carotid bruit is not present.   Cardiovascular: Normal rate, regular rhythm, normal heart sounds and intact distal pulses.   Pulmonary/Chest: Effort normal and breath sounds normal.   Abdominal: Soft. Bowel sounds are normal.   Musculoskeletal: Normal range of motion. She exhibits edema (nonpitting edema- suspect chronic).   Neurological: She is alert and oriented to person, place, and time. She has normal reflexes.   Skin: Skin is warm and dry.   Psychiatric: She has a normal mood and affect. Her behavior is normal. Judgment and thought content normal.     Blood pressure (!) 190/120, pulse 64, resp. rate 18, height 170.2 cm (67\"), weight 103 kg (228 lb), SpO2 98 %, not currently breastfeeding.      Lab Review:       Assessment:          Diagnosis Plan   1. Shortness of breath  Adult Transthoracic Echo Complete W/ Cont if Necessary Per Protocol   2. " Paroxysmal SVT (supraventricular tachycardia) (CMS/[facility])  Adult Transthoracic Echo Complete W/ Cont if Necessary Per Protocol   3. Pacemaker     4. Hypertension, uncontrolled     5. Morbid obesity due to excess calories (CMS/[facility])     6. Lupus anticoagulant disorder (CMS/[facility])            Plan:       1. Shortness of Breath- chronic problem- relieved some with inhaler- will check echo to rule out heart failure  2. Paroxsymal SVT - Noted on Device interrogations. Patient reports occasional heart racing. Will add Lopressor- this will also help with hypertension- concern because patient is noncompliant with medications. Encouraged her to take medications as prescribed.   3. Pacemaker- SVT noted on check- otherwise normal.  4. Blood Pressure elevated- however patient has not had Lisinopril today - she was given peanut butter and crackers and she took her medication. Educated if does not go down to take Clonidine as prescribed if does not go down then go to ER.  5. Patient's Body mass index is 35.71 kg/m². BMI is above normal parameters. Recommendations include: exercise counseling and nutrition counseling.  6. Chronic anticoagulation with warfarin- managed by hematology

## 2019-02-14 ENCOUNTER — NURSE ONLY (OUTPATIENT)
Dept: INTERNAL MEDICINE | Age: 69
End: 2019-02-14
Payer: MEDICARE

## 2019-02-14 DIAGNOSIS — I82.4Y9 DVT, LOWER EXTREMITY, PROXIMAL, ACUTE, UNSPECIFIED LATERALITY (HCC): Primary | ICD-10-CM

## 2019-02-14 LAB
INTERNATIONAL NORMALIZATION RATIO, POC: 2.4
PROTHROMBIN TIME, POC: NORMAL

## 2019-02-14 PROCEDURE — 85610 PROTHROMBIN TIME: CPT | Performed by: INTERNAL MEDICINE

## 2019-02-18 ASSESSMENT — LIFESTYLE VARIABLES: HOW OFTEN DO YOU HAVE A DRINK CONTAINING ALCOHOL: 0

## 2019-02-22 ENCOUNTER — APPOINTMENT (OUTPATIENT)
Dept: CARDIOLOGY | Facility: HOSPITAL | Age: 69
End: 2019-02-22

## 2019-02-22 ENCOUNTER — TELEPHONE (OUTPATIENT)
Dept: INTERNAL MEDICINE | Age: 69
End: 2019-02-22

## 2019-02-22 ENCOUNTER — OFFICE VISIT (OUTPATIENT)
Dept: INTERNAL MEDICINE | Age: 69
End: 2019-02-22
Payer: MEDICARE

## 2019-02-22 VITALS
BODY MASS INDEX: 35.63 KG/M2 | RESPIRATION RATE: 20 BRPM | DIASTOLIC BLOOD PRESSURE: 90 MMHG | WEIGHT: 227 LBS | SYSTOLIC BLOOD PRESSURE: 142 MMHG | HEART RATE: 78 BPM | HEIGHT: 67 IN | OXYGEN SATURATION: 97 %

## 2019-02-22 DIAGNOSIS — E55.9 VITAMIN D DEFICIENCY: ICD-10-CM

## 2019-02-22 DIAGNOSIS — F32.A DEPRESSION, UNSPECIFIED DEPRESSION TYPE: ICD-10-CM

## 2019-02-22 DIAGNOSIS — E03.9 HYPOTHYROIDISM, UNSPECIFIED TYPE: ICD-10-CM

## 2019-02-22 DIAGNOSIS — Z12.11 SCREENING FOR COLON CANCER: ICD-10-CM

## 2019-02-22 DIAGNOSIS — Z00.00 MEDICARE ANNUAL WELLNESS VISIT, SUBSEQUENT: Primary | ICD-10-CM

## 2019-02-22 DIAGNOSIS — I10 ESSENTIAL HYPERTENSION: ICD-10-CM

## 2019-02-22 DIAGNOSIS — M19.90 OSTEOARTHRITIS, UNSPECIFIED OSTEOARTHRITIS TYPE, UNSPECIFIED SITE: ICD-10-CM

## 2019-02-22 DIAGNOSIS — J45.909 UNCOMPLICATED ASTHMA, UNSPECIFIED ASTHMA SEVERITY, UNSPECIFIED WHETHER PERSISTENT: ICD-10-CM

## 2019-02-22 DIAGNOSIS — E53.8 B12 DEFICIENCY: ICD-10-CM

## 2019-02-22 DIAGNOSIS — R79.89 LOW VITAMIN D LEVEL: ICD-10-CM

## 2019-02-22 DIAGNOSIS — Z12.39 SCREENING FOR BREAST CANCER: ICD-10-CM

## 2019-02-22 DIAGNOSIS — D68.59 HYPERCOAGULABLE STATE (HCC): ICD-10-CM

## 2019-02-22 DIAGNOSIS — K21.9 GASTROESOPHAGEAL REFLUX DISEASE WITHOUT ESOPHAGITIS: ICD-10-CM

## 2019-02-22 DIAGNOSIS — N18.9 CHRONIC KIDNEY DISEASE, UNSPECIFIED CKD STAGE: ICD-10-CM

## 2019-02-22 DIAGNOSIS — E11.21 TYPE II DIABETES MELLITUS WITH NEPHROPATHY (HCC): ICD-10-CM

## 2019-02-22 DIAGNOSIS — Z86.718 HISTORY OF DVT (DEEP VEIN THROMBOSIS): ICD-10-CM

## 2019-02-22 DIAGNOSIS — Z98.84 HISTORY OF BARIATRIC SURGERY: ICD-10-CM

## 2019-02-22 DIAGNOSIS — Z23 NEED FOR PNEUMOCOCCAL VACCINE: ICD-10-CM

## 2019-02-22 LAB
CALCIUM SERPL-MCNC: 9.1 MG/DL (ref 8.8–10.2)
INR BLD: 3.16 (ref 0.88–1.18)
PROTHROMBIN TIME: 31.6 SEC (ref 12–14.6)
TSH SERPL DL<=0.05 MIU/L-ACNC: 4.46 UIU/ML (ref 0.27–4.2)
VITAMIN D 25-HYDROXY: 17.9 NG/ML

## 2019-02-22 PROCEDURE — G8598 ASA/ANTIPLAT THER USED: HCPCS | Performed by: INTERNAL MEDICINE

## 2019-02-22 PROCEDURE — G0009 ADMIN PNEUMOCOCCAL VACCINE: HCPCS | Performed by: INTERNAL MEDICINE

## 2019-02-22 PROCEDURE — 4040F PNEUMOC VAC/ADMIN/RCVD: CPT | Performed by: INTERNAL MEDICINE

## 2019-02-22 PROCEDURE — 90732 PPSV23 VACC 2 YRS+ SUBQ/IM: CPT | Performed by: INTERNAL MEDICINE

## 2019-02-22 PROCEDURE — G0439 PPPS, SUBSEQ VISIT: HCPCS | Performed by: INTERNAL MEDICINE

## 2019-02-22 PROCEDURE — 96372 THER/PROPH/DIAG INJ SC/IM: CPT | Performed by: INTERNAL MEDICINE

## 2019-02-22 PROCEDURE — G8482 FLU IMMUNIZE ORDER/ADMIN: HCPCS | Performed by: INTERNAL MEDICINE

## 2019-02-22 PROCEDURE — 3044F HG A1C LEVEL LT 7.0%: CPT | Performed by: INTERNAL MEDICINE

## 2019-02-22 RX ORDER — CYANOCOBALAMIN 1000 UG/ML
1000 INJECTION INTRAMUSCULAR; SUBCUTANEOUS ONCE
Status: COMPLETED | OUTPATIENT
Start: 2019-02-22 | End: 2019-02-22

## 2019-02-22 RX ORDER — AMLODIPINE BESYLATE 5 MG/1
5 TABLET ORAL
COMMUNITY
End: 2019-06-07

## 2019-02-22 RX ADMIN — CYANOCOBALAMIN 1000 MCG: 1000 INJECTION INTRAMUSCULAR; SUBCUTANEOUS at 15:25

## 2019-02-22 ASSESSMENT — ENCOUNTER SYMPTOMS
SORE THROAT: 0
STRIDOR: 0
WHEEZING: 0
EYE DISCHARGE: 0
COLOR CHANGE: 0
ABDOMINAL PAIN: 0
RHINORRHEA: 0
CONSTIPATION: 0
SINUS PRESSURE: 0
SINUS PAIN: 0
ABDOMINAL DISTENTION: 0
BLOOD IN STOOL: 0
BACK PAIN: 1
APNEA: 0
COUGH: 0
CHEST TIGHTNESS: 0
EYE ITCHING: 0
DIARRHEA: 0
SHORTNESS OF BREATH: 0
VOICE CHANGE: 0
VOMITING: 0
TROUBLE SWALLOWING: 0

## 2019-02-25 ENCOUNTER — TELEPHONE (OUTPATIENT)
Dept: INTERNAL MEDICINE | Age: 69
End: 2019-02-25

## 2019-02-28 ENCOUNTER — NURSE ONLY (OUTPATIENT)
Dept: INTERNAL MEDICINE | Age: 69
End: 2019-02-28
Payer: MEDICARE

## 2019-02-28 DIAGNOSIS — I82.4Y9 DVT, LOWER EXTREMITY, PROXIMAL, ACUTE, UNSPECIFIED LATERALITY (HCC): Primary | ICD-10-CM

## 2019-02-28 LAB
INTERNATIONAL NORMALIZATION RATIO, POC: 4.6
PROTHROMBIN TIME, POC: NORMAL

## 2019-02-28 PROCEDURE — 85610 PROTHROMBIN TIME: CPT | Performed by: INTERNAL MEDICINE

## 2019-03-01 ENCOUNTER — HOSPITAL ENCOUNTER (OUTPATIENT)
Dept: CARDIOLOGY | Facility: HOSPITAL | Age: 69
Discharge: HOME | End: 2019-03-01
Admitting: NURSE PRACTITIONER

## 2019-03-01 PROCEDURE — 93306 TTE W/DOPPLER COMPLETE: CPT | Performed by: INTERNAL MEDICINE

## 2019-03-01 PROCEDURE — 93306 TTE W/DOPPLER COMPLETE: CPT

## 2019-03-04 LAB
BH CV ECHO MEAS - AO MAX PG (FULL): 2.8 MMHG
BH CV ECHO MEAS - AO MAX PG: 4.6 MMHG
BH CV ECHO MEAS - AO MEAN PG (FULL): 1 MMHG
BH CV ECHO MEAS - AO MEAN PG: 2 MMHG
BH CV ECHO MEAS - AO ROOT AREA (BSA CORRECTED): 1.4
BH CV ECHO MEAS - AO ROOT AREA: 6.6 CM^2
BH CV ECHO MEAS - AO ROOT DIAM: 2.9 CM
BH CV ECHO MEAS - AO V2 MAX: 107 CM/SEC
BH CV ECHO MEAS - AO V2 MEAN: 71.2 CM/SEC
BH CV ECHO MEAS - AO V2 VTI: 23.2 CM
BH CV ECHO MEAS - AVA(I,A): 2.5 CM^2
BH CV ECHO MEAS - AVA(I,D): 2.5 CM^2
BH CV ECHO MEAS - AVA(V,A): 1.9 CM^2
BH CV ECHO MEAS - AVA(V,D): 1.9 CM^2
BH CV ECHO MEAS - BSA(HAYCOCK): 2.2 M^2
BH CV ECHO MEAS - BSA: 2.1 M^2
BH CV ECHO MEAS - BZI_BMI: 35.2 KILOGRAMS/M^2
BH CV ECHO MEAS - BZI_METRIC_HEIGHT: 170.2 CM
BH CV ECHO MEAS - BZI_METRIC_WEIGHT: 102.1 KG
BH CV ECHO MEAS - EDV(CUBED): 74.1 ML
BH CV ECHO MEAS - EDV(MOD-SP4): 114 ML
BH CV ECHO MEAS - EDV(TEICH): 78.6 ML
BH CV ECHO MEAS - EF(CUBED): 76.3 %
BH CV ECHO MEAS - EF(MOD-SP4): 58.6 %
BH CV ECHO MEAS - EF(TEICH): 68.7 %
BH CV ECHO MEAS - ESV(CUBED): 17.6 ML
BH CV ECHO MEAS - ESV(MOD-SP4): 47.2 ML
BH CV ECHO MEAS - ESV(TEICH): 24.6 ML
BH CV ECHO MEAS - FS: 38.1 %
BH CV ECHO MEAS - IVS/LVPW: 1
BH CV ECHO MEAS - IVSD: 0.9 CM
BH CV ECHO MEAS - LA DIMENSION: 4.5 CM
BH CV ECHO MEAS - LA/AO: 1.6
BH CV ECHO MEAS - LAT PEAK E' VEL: 5.2 CM/SEC
BH CV ECHO MEAS - LV DIASTOLIC VOL/BSA (35-75): 53.6 ML/M^2
BH CV ECHO MEAS - LV MASS(C)D: 118.7 GRAMS
BH CV ECHO MEAS - LV MASS(C)DI: 55.8 GRAMS/M^2
BH CV ECHO MEAS - LV MAX PG: 1.8 MMHG
BH CV ECHO MEAS - LV MEAN PG: 1 MMHG
BH CV ECHO MEAS - LV SYSTOLIC VOL/BSA (12-30): 22.2 ML/M^2
BH CV ECHO MEAS - LV V1 MAX: 65.1 CM/SEC
BH CV ECHO MEAS - LV V1 MEAN: 49.6 CM/SEC
BH CV ECHO MEAS - LV V1 VTI: 18.8 CM
BH CV ECHO MEAS - LVIDD: 4.2 CM
BH CV ECHO MEAS - LVIDS: 2.6 CM
BH CV ECHO MEAS - LVLD AP4: 7.3 CM
BH CV ECHO MEAS - LVLS AP4: 6.2 CM
BH CV ECHO MEAS - LVOT AREA (M): 3.1 CM^2
BH CV ECHO MEAS - LVOT AREA: 3.1 CM^2
BH CV ECHO MEAS - LVOT DIAM: 2 CM
BH CV ECHO MEAS - LVPWD: 0.9 CM
BH CV ECHO MEAS - MED PEAK E' VEL: 5 CM/SEC
BH CV ECHO MEAS - MR ALIAS VEL: 38.5 CM/SEC
BH CV ECHO MEAS - MR ERO: 0.09 CM^2
BH CV ECHO MEAS - MR FLOW RATE: 60.5 CM^3/SEC
BH CV ECHO MEAS - MR MAX PG: 170.8 MMHG
BH CV ECHO MEAS - MR MAX VEL: 651.3 CM/SEC
BH CV ECHO MEAS - MR MEAN PG: 118.5 MMHG
BH CV ECHO MEAS - MR MEAN VEL: 510.5 CM/SEC
BH CV ECHO MEAS - MR PISA RADIUS: 0.5 CM
BH CV ECHO MEAS - MR PISA: 1.6 CM^2
BH CV ECHO MEAS - MR VOLUME: 23.3 ML
BH CV ECHO MEAS - MR VTI: 250.5 CM
BH CV ECHO MEAS - MV AREA (1 DIAM): 8.6 CM^2
BH CV ECHO MEAS - MV DIAM: 3.3 CM
BH CV ECHO MEAS - MV FLOW AREA(1DIAM): 8.6 CM^2
BH CV ECHO MEAS - PI END-D VEL: 113 CM/SEC
BH CV ECHO MEAS - RAP SYSTOLE: 5 MMHG
BH CV ECHO MEAS - RVSP: 35.9 MMHG
BH CV ECHO MEAS - SI(AO): 72.1 ML/M^2
BH CV ECHO MEAS - SI(CUBED): 26.6 ML/M^2
BH CV ECHO MEAS - SI(LVOT): 27.8 ML/M^2
BH CV ECHO MEAS - SI(MOD-SP4): 31.4 ML/M^2
BH CV ECHO MEAS - SI(TEICH): 25.4 ML/M^2
BH CV ECHO MEAS - SV(AO): 153.2 ML
BH CV ECHO MEAS - SV(CUBED): 56.5 ML
BH CV ECHO MEAS - SV(LVOT): 59.1 ML
BH CV ECHO MEAS - SV(MOD-SP4): 66.8 ML
BH CV ECHO MEAS - SV(TEICH): 54 ML
BH CV ECHO MEAS - TR MAX VEL: 278 CM/SEC
LEFT ATRIUM VOLUME INDEX: 53.1 ML/M2
LEFT ATRIUM VOLUME: 113 CM3
LV EF 2D ECHO EST: 60 %
MAXIMAL PREDICTED HEART RATE: 151 BPM
STRESS TARGET HR: 128 BPM

## 2019-03-07 ENCOUNTER — NURSE ONLY (OUTPATIENT)
Dept: INTERNAL MEDICINE | Age: 69
End: 2019-03-07
Payer: MEDICARE

## 2019-03-07 DIAGNOSIS — I82.4Y9 DVT, LOWER EXTREMITY, PROXIMAL, ACUTE, UNSPECIFIED LATERALITY (HCC): Primary | ICD-10-CM

## 2019-03-07 LAB
INTERNATIONAL NORMALIZATION RATIO, POC: 2.5
PROTHROMBIN TIME, POC: NORMAL

## 2019-03-07 PROCEDURE — 85610 PROTHROMBIN TIME: CPT | Performed by: INTERNAL MEDICINE

## 2019-03-21 ENCOUNTER — NURSE ONLY (OUTPATIENT)
Dept: INTERNAL MEDICINE | Age: 69
End: 2019-03-21
Payer: MEDICARE

## 2019-03-21 DIAGNOSIS — I82.4Y9 DVT, LOWER EXTREMITY, PROXIMAL, ACUTE, UNSPECIFIED LATERALITY (HCC): Primary | ICD-10-CM

## 2019-03-21 LAB
INTERNATIONAL NORMALIZATION RATIO, POC: 1.8
PROTHROMBIN TIME, POC: NORMAL

## 2019-03-21 PROCEDURE — 85610 PROTHROMBIN TIME: CPT | Performed by: INTERNAL MEDICINE

## 2019-04-12 ENCOUNTER — HOSPITAL ENCOUNTER (OUTPATIENT)
Dept: WOMENS IMAGING | Age: 69
Discharge: HOME | End: 2019-04-12
Payer: MEDICARE

## 2019-04-12 DIAGNOSIS — Z12.39 SCREENING FOR BREAST CANCER: ICD-10-CM

## 2019-04-12 PROCEDURE — 77063 BREAST TOMOSYNTHESIS BI: CPT

## 2019-04-29 ENCOUNTER — OFFICE VISIT (OUTPATIENT)
Dept: NEUROLOGY | Facility: CLINIC | Age: 69
End: 2019-04-29

## 2019-04-29 VITALS
WEIGHT: 233 LBS | HEIGHT: 67 IN | SYSTOLIC BLOOD PRESSURE: 134 MMHG | HEART RATE: 68 BPM | RESPIRATION RATE: 18 BRPM | DIASTOLIC BLOOD PRESSURE: 72 MMHG | BODY MASS INDEX: 36.57 KG/M2

## 2019-04-29 DIAGNOSIS — I10 HYPERTENSION, UNCONTROLLED: ICD-10-CM

## 2019-04-29 DIAGNOSIS — G47.10 HYPERSOMNOLENCE: ICD-10-CM

## 2019-04-29 DIAGNOSIS — G47.33 OSA (OBSTRUCTIVE SLEEP APNEA): Primary | ICD-10-CM

## 2019-04-29 PROCEDURE — 99214 OFFICE O/P EST MOD 30 MIN: CPT | Performed by: NURSE PRACTITIONER

## 2019-04-29 RX ORDER — ALBUTEROL SULFATE 90 UG/1
2 AEROSOL, METERED RESPIRATORY (INHALATION) EVERY 4 HOURS PRN
COMMUNITY
End: 2019-09-23 | Stop reason: ALTCHOICE

## 2019-04-29 RX ORDER — TIOTROPIUM BROMIDE 18 UG/1
18 CAPSULE ORAL; RESPIRATORY (INHALATION)
COMMUNITY
Start: 2019-02-22

## 2019-04-29 RX ORDER — WARFARIN SODIUM 7.5 MG/1
7.5 TABLET ORAL DAILY
COMMUNITY

## 2019-04-29 NOTE — PROGRESS NOTES
Subjective     Chief Complaint   Patient presents with   • Sleep Apnea       Veronica Stewart is a 68 y.o. female who is here today to follow-up for sleep apnea.  She was diagnosed with sleep apnea in approximately 2003.  Her last available appointment, and only appointment available to me today, was in January 2018 with Dr. Krishnamurthy.  Compliance report was not available at that time.  Dr. 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  She has a past medical history of shortness of breath, paroxysmal SVT, status post pacemaker, hypertension, morbid obesity, lupus anticoagulant disorder, hyperlipidemia, liver disease, depression, diabetes mellitus..  She continues to follow with hematology in regards to lupus anticoagulant disorder as well as cardiology in regards to pacemaker, cardiac issues.  Her primary care doctor is Dr. Jackson.  She does have an appointment with her tomorrow for PT/INR check.    When she wears her Pap machine she fights with the nose piece. She states that she may have had her original sleep study in 2003 here at [facility].She has family stress currently and that is impairing her ability to wear her machine. She is having issues initiating and maintaining sleep. She has a FU with her PCP soon. She does state she feels anxiety plays a big role in her ability to sleep. She denies thoughts of harming self or others.     She states she continues to snore when she is wearing her machine. Her sleep is poor currently and she is fatigued. She denies morning headaches. She is driving and she is not falling asleep while driving but states she does not drive at this time.. She does awaken up gasping and snorting. She struggles with her mask and fit as well as headgear and chinstrap. She has had tonsils and adenoids removed.     She denies alcohol, tobacco use, or illicit drug use. She has had an  "instance in the past where she was woken up feeling like she she was paralyzed.  She does complain of weakness and drowsiness after highly emotional events.     Compliance report available for review in office today.  Over the past 90 days she has worn her machine 27 out of those days.  24 days have been greater than or equal to 4 hours.  Her average usage whenever she does wear her machine is approximately 6 hours.  She is currently set at BiPAP 9/5 cm H2O.  Her AHI on current therapy is 20.4.  It does not appear that she has used her machine since March 28 of 2018.  She states she is willing and wanting to try to wear therapy again.                                                      [place] Sleepiness Scale    Situation Chance of Dozing or Sleeping   • Sitting and reading 2 - moderate chance of dosing or sleeping   • Watching TV 3 - high chance of dosing or sleeping   • Sitting inactive in a public place 2 - moderate chance of dosing or sleeping   • Being a passenger in a motor vehicle for an hour or more 2 - moderate chance of dosing or sleeping   • Lying down in the afternoon 2 - moderate chance of dosing or sleeping   • Sitting and talking to someone 2 - moderate chance of dosing or sleeping   • Sitting quietly after lunch (no alcohol) 2 - moderate chance of dosing or sleeping   • Stopped for a few minutes in traffic while driving 1 - slight chance of dosing or sleeping   Total score (add the scores up) 16           Does the patient SNORE? Yes    Does the patient feel TIRED, fatigued or sleepy during the day? Yes    Has anyone OBSERVED the stop breathing or cough/gasp during sleep? Yes    Does the patient have high blood PRESSURE? Yes    Is the patient's BMI greater than 35? Yes    Is the patient’s AGE over 50 years old? Yes   Is the patient's NECK size greater than 17 in for a male and 16 in for a female? No    Is the patient’s GENDER male? No      0-2 \"Yes\" Responses = Low Risk of FRANCIE  3-4 \"Yes\" Responses = " "Intermediate Risk of FRANCIE  5-8 \"Yes\" Responses = High Risk FRANCIE    Adapted from STOP-BANG Questionnaire  Pal F et al. Anesthesiology 2008;108:812-21.      Neurologic Problem   Primary symptoms comment: francie. This is a chronic problem. The current episode started more than 1 year ago. The neurological problem developed gradually. The problem is unchanged. Associated symptoms include fatigue and shortness of breath. Pertinent negatives include no chest pain or palpitations. (Daytime fatigue, snoring, gasping for air, hypersomnolence, poor sleep, unrestored sleep) Treatments tried: pap therapy. The treatment provided mild relief.        Current Outpatient Medications   Medication Sig Dispense Refill   • ADVAIR DISKUS 250-50 MCG/DOSE DISKUS 2 (two) times a day.     • albuterol sulfate  (90 Base) MCG/ACT inhaler Inhale 2 puffs Every 4 (Four) Hours As Needed for Wheezing.     • aspirin 81 MG EC tablet daily.     • bumetanide (BUMEX) 1 MG tablet Take 1 mg by mouth Daily.     • clobetasol (TEMOVATE) 0.05 % cream Apply  topically 2 (Two) Times a Day.     • CloNIDine (CATAPRES) 0.1 MG tablet Take 0.1 mg by mouth 2 (Two) Times a Day As Needed.     • cyanocobalamin 1000 MCG/ML injection Inject 1,000 mcg into the shoulder, thigh, or buttocks Every 28 (Twenty-Eight) Days.     • ergocalciferol (ERGOCALCIFEROL) 08667 UNITS capsule Take 1 capsule by mouth 1 (One) Time Per Week. 4 capsule 3   • escitalopram (LEXAPRO) 10 MG tablet Take 10 mg by mouth Daily.     • gabapentin (NEURONTIN) 300 MG capsule 3 (Three) Times a Day.     • HYDROmorphone (DILAUDID) 2 MG tablet Take 2 mg by mouth Every 4 (Four) Hours As Needed for Moderate Pain .     • levothyroxine (SYNTHROID, LEVOTHROID) 100 MCG tablet Take  by mouth.     • lisinopril (PRINIVIL,ZESTRIL) 20 MG tablet Take 20 mg by mouth Daily.     • Multiple Vitamins-Minerals (CENTRUM MULTIGUMMIES) chewable tablet Chew Daily.     • ondansetron (ZOFRAN) 4 MG tablet Take 4 mg by mouth Every 8 " (Eight) Hours As Needed for Nausea or Vomiting.     • pantoprazole (PROTONIX) 40 MG EC tablet 2 (two) times a day.     • potassium chloride (K-DUR,KLOR-CON) 10 MEQ CR tablet Take  by mouth.     • raNITIdine (ZANTAC) 150 MG tablet Take 150 mg by mouth Every 12 (Twelve) Hours As Needed for Heartburn.     • tiotropium (SPIRIVA HANDIHALER) 18 MCG per inhalation capsule Place 18 mcg into inhaler and inhale.     • warfarin (COUMADIN) 7.5 MG tablet Take 7.5 mg by mouth. Take 7.5mg on Monday, Wednesday, Friday.  Takes 15mg on all other days.       No current facility-administered medications for this visit.        Past Medical History:   Diagnosis Date   • Anxiety    • Redd-tachy syndrome (CMS/HCC)    • Bradycardia    • Breath shortness    • Cardiac pacemaker     11/09 MED [facility]   • Chest pain    • Chronic fatigue    • Depression    • Diabetes mellitus (CMS/HCC)    • Dizziness    • Fatigue    • Follow-up exam    • Heart burn    • Hypercoagulable state, primary (CMS/HCC)     LUPUS ANTI-COAG   • Hyperlipidemia    • Hypertension    • Liver disease    • Shortness of breath    • Sleep apnea    • Stroke (CMS/HCC)    • Syncope    • Tachy-redd syndrome (CMS/HCC)        Past Surgical History:   Procedure Laterality Date   • APPENDECTOMY     • CATARACT EXTRACTION     • CHOLECYSTECTOMY     • HERNIA REPAIR     • HYSTERECTOMY     • OOPHORECTOMY     • PACEMAKER IMPLANTATION     • REPLACEMENT TOTAL KNEE Right 03/26/2018   • TRAM-EN-Y PROCEDURE  2002    Dr Dahiana [facility]   • SPLENECTOMY         family history includes Alcohol abuse in her brother; Anemia in her mother and sister; Cancer in her maternal grandmother; Cervical cancer in her mother; Clotting disorder in her sister; Colon cancer in her brother; Coronary artery disease in her mother; Diabetes in her maternal grandmother; Fibromyalgia in her sister; Heart failure in her father and maternal grandfather; Hyperlipidemia in her mother; Kidney failure in her father; No  "Known Problems in her paternal grandfather and paternal grandmother.    Social History     Tobacco Use   • Smoking status: Never Smoker   • Smokeless tobacco: Never Used   Substance Use Topics   • Alcohol use: No   • Drug use: No       Review of Systems   Constitutional: Positive for fatigue.   HENT: Negative.    Eyes: Negative.    Respiratory: Positive for apnea and shortness of breath.    Cardiovascular: Positive for leg swelling. Negative for chest pain and palpitations.   Gastrointestinal: Negative.    Endocrine: Negative.    Genitourinary: Negative.    Musculoskeletal: Positive for gait problem.   Skin: Negative.    Allergic/Immunologic: Negative.    Hematological: Negative.    Psychiatric/Behavioral: Negative.    All other systems reviewed and are negative.      Objective     /72 (BP Location: Left arm, Patient Position: Sitting)   Pulse 68   Resp 18   Ht 170.2 cm (67\")   Wt 106 kg (233 lb)   Breastfeeding? No   BMI 36.49 kg/m² , Body mass index is 36.49 kg/m².    Physical Exam   Constitutional: She is oriented to person, place, and time. She appears well-developed and well-nourished.   HENT:   Head: Normocephalic and atraumatic.   Nose: Nose normal.   Mouth/Throat: Uvula is midline, oropharynx is clear and moist and mucous membranes are normal.   Mallampati class III   Eyes: Pupils are equal, round, and reactive to light.   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 Neck supple.   Cardiovascular: Normal rate, normal heart sounds and intact distal pulses.   Pulmonary/Chest: Effort normal and breath sounds normal.   Abdominal: Soft.   Musculoskeletal: Normal range of motion.   Neurological: She is alert and oriented to person, place, and time. She has normal strength and normal reflexes.   Skin: Skin is warm and dry. Capillary refill takes less than 2 seconds.   Psychiatric: She has a normal mood and affect. Her speech is normal and behavior is normal. Cognition and " memory are normal.   Nursing note and vitals reviewed.        Results for orders placed or performed in visit on 02/12/19   Adult Transthoracic Echo Complete W/ Cont if Necessary Per Protocol   Result Value Ref Range    BSA 2.1 m^2    IVSd 0.9 cm    LVIDd 4.2 cm    LVIDs 2.6 cm    LVPWd 0.9 cm    IVS/LVPW 1.0     FS 38.1 %    EDV(Teich) 78.6 ml    ESV(Teich) 24.6 ml    EF(Teich) 68.7 %    EDV(cubed) 74.1 ml    ESV(cubed) 17.6 ml    EF(cubed) 76.3 %    LV mass(C)d 118.7 grams    LV mass(C)dI 55.8 grams/m^2    SV(Teich) 54.0 ml    SI(Teich) 25.4 ml/m^2    SV(cubed) 56.5 ml    SI(cubed) 26.6 ml/m^2    MV Diam 3.3 cm    Ao root diam 2.9 cm    Ao root area 6.6 cm^2    LA dimension 4.5 cm    LA/Ao 1.6     LVOT diam 2.0 cm    LVOT area 3.1 cm^2    LVOT area(traced) 3.1 cm^2    LVLd ap4 7.3 cm    EDV(MOD-sp4) 114.0 ml    LVLs ap4 6.2 cm    ESV(MOD-sp4) 47.2 ml    EF(MOD-sp4) 58.6 %    SV(MOD-sp4) 66.8 ml    SI(MOD-sp4) 31.4 ml/m^2    Ao root area (BSA corrected) 1.4     LV Trevino Vol (BSA corrected) 53.6 ml/m^2    LV Sys Vol (BSA corrected) 22.2 ml/m^2    MV area (1 diam) 8.6 cm^2    MV Flow area(1diam) 8.6 cm^2    Ao pk kat 107.0 cm/sec    Ao max PG 4.6 mmHg    Ao max PG (full) 2.8 mmHg    Ao V2 mean 71.2 cm/sec    Ao mean PG 2.0 mmHg    Ao mean PG (full) 1.0 mmHg    Ao V2 VTI 23.2 cm    DANILO(I,A) 2.5 cm^2    DANILO(I,D) 2.5 cm^2    DANILO(V,A) 1.9 cm^2    DANILO(V,D) 1.9 cm^2    LV V1 max PG 1.8 mmHg    LV V1 mean PG 1.0 mmHg    LV V1 max 65.1 cm/sec    LV V1 mean 49.6 cm/sec    LV V1 VTI 18.8 cm    MR max kat 651.3 cm/sec    MR max .8 mmHg    MR mean kat 510.5 cm/sec    MR mean .5 mmHg    MR .5 cm    MR PISA 1.6 cm^2    MR flow rate 60.5 cm^3/sec    MR ERO 0.09 cm^2    MR volume 23.3 ml    MR PISA radius 0.5 cm    MR alias kat 38.5 cm/sec    SV(Ao) 153.2 ml    SI(Ao) 72.1 ml/m^2    SV(LVOT) 59.1 ml    SI(LVOT) 27.8 ml/m^2    PI end-d kat 113.0 cm/sec    TR max kat 278.0 cm/sec    RVSP(TR) 35.9 mmHg    RAP systole  5.0 mmHg     CV ECHO DARLENE - BZI_BMI 35.2 kilograms/m^2     CV ECHO DARLENE - BSA(HAYCOCK) 2.2 m^2     CV ECHO DARLENE - BZI_METRIC_WEIGHT 102.1 kg     CV ECHO DARLENE - BZI_METRIC_HEIGHT 170.2 cm    Target HR (85%) 128 bpm    Max. Pred. HR (100%) 151 bpm    LA volume 113.0 cm3    LA Volume Index 53.1 mL/m2    Lat Peak E' Jimi 5.2 cm/sec    Med Peak E' Jimi 5.00 cm/sec    Echo EF Estimated 60 %        ASSESSMENT/PLAN    Veronica was seen today for sleep apnea.    Diagnoses and all orders for this visit:    FRANCIE (obstructive sleep apnea)  -     Polysomnography 4 or More Parameters; Future    Hypersomnolence  -     Polysomnography 4 or More Parameters; Future    Hypertension, uncontrolled           allergies and all known medications/prescriptions have been reviewed using resources available on this encounter.    Patient's Body mass index is 36.49 kg/m². BMI is above normal parameters. Recommendations include: referral to primary care.    Return in about 4 weeks (around 2019).    MEDICAL DECISION MAKIN.  Compliance report reviewed in office.  We have no data that shows she has worn her machine since 2019.  She admittedly states she has been under stress and this has greatly impacted her ability to be compliant with therapy.  Her AHI at that time was elevated to 20.4.  Her original sleep study occurred more than 10 years ago.  I did talk with patient in regard to several options we have.  At this point I think it would be feasible to set her up with a repeat polysomnography testing, split-night study.  Patient states that ill fitting mask greatly impacted her ability to be compliant with therapy 2.  During the study we will fit her with an appropriate mask and get her set at a reasonable pressure setting.  She is agreeable with this plan.  2.  Continue to follow with cardiology as scheduled for pacemaker evaluation/BP monitoring.  BP is good in office today.  3.  Continue to follow with PCP as scheduled.  Did  advise her to follow-up with PCP in regards to anxiety.  This is greatly impacting her ability to obtain efficient sleep at this time.  She states she will follow-up with her primary care provider.  She denies thoughts of harming herself or others.  4.  Counseled on multimodal treatment approach of sleep apnea including diet, exercise, sleep hygiene, compliance of Pap therapy.  Risk of untreated sleep apnea were discussed in office to include but not limited to increased risk of heart attack, stroke, high blood pressure, cardiac arrhythmia such as atrial fibrillation.  5.  Patient reports past episodes of possible sleep paralysis as well as weakness and drowsiness with highly emotional events.  Did advise her to abstain from driving at this point.  She denies any tonic-clonic, seizure-like activity.  She denies any loss of bowel or bladder.  At this time we will pursue polysomnography split-night testing to get her started on treatment plan for sleep apnea.  I will see her back in the office in 4 weeks.  We may pursue repeat polysomnography with MSLT testing at that time to evaluate for any signs of narcolepsy.    Terri Adams, APRN

## 2019-05-01 ENCOUNTER — TELEPHONE (OUTPATIENT)
Dept: INTERNAL MEDICINE | Age: 69
End: 2019-05-01

## 2019-05-01 LAB — INR BLD: 1.2

## 2019-05-02 ENCOUNTER — TELEPHONE (OUTPATIENT)
Dept: INTERNAL MEDICINE | Age: 69
End: 2019-05-02

## 2019-05-02 ENCOUNTER — ANTI-COAG VISIT (OUTPATIENT)
Dept: INTERNAL MEDICINE | Age: 69
End: 2019-05-02
Payer: MEDICARE

## 2019-05-02 DIAGNOSIS — Z79.01 ANTICOAGULATED ON COUMADIN: ICD-10-CM

## 2019-05-02 PROCEDURE — 93793 ANTICOAG MGMT PT WARFARIN: CPT | Performed by: INTERNAL MEDICINE

## 2019-05-02 NOTE — TELEPHONE ENCOUNTER
I called and spoke with the patient. She does not have a 10 mg at home. Just the 7.5 mg tablets. She will take one and one-half tablets tonight and then resume her usual dose and recheck in one week.

## 2019-05-02 NOTE — TELEPHONE ENCOUNTER
Patient missed her dose for 5 days. She usually takes 7.5 mg daily. Last night was the first dose she took on schedule.

## 2019-05-02 NOTE — PROGRESS NOTES
HOME MONITORING REPORT    INR today:   Results for orders placed or performed in visit on 05/02/19   Protime-INR   Result Value Ref Range    INR 1.20        INR Goal: 2.0-3.0    Dosing Plan  As of 5/2/2019    TTR:   12.6 % (1.1 y)   Full warfarin instructions:   5/2: 11.25 mg; Otherwise 7.5 mg every day              PLAN:PATIENT NOTIFIED TO TAKE 1.5 TABLETS TONIGHT AND THEN CONTINUE CURRENT DOSE AND RECHECK IN ONE WEEK.

## 2019-05-03 ENCOUNTER — HOSPITAL ENCOUNTER (OUTPATIENT)
Dept: SLEEP MEDICINE | Facility: HOSPITAL | Age: 69
Discharge: HOME | End: 2019-05-03
Admitting: NURSE PRACTITIONER

## 2019-05-03 VITALS
WEIGHT: 233 LBS | HEART RATE: 65 BPM | RESPIRATION RATE: 16 BRPM | SYSTOLIC BLOOD PRESSURE: 134 MMHG | BODY MASS INDEX: 36.57 KG/M2 | HEIGHT: 67 IN | DIASTOLIC BLOOD PRESSURE: 72 MMHG | OXYGEN SATURATION: 96 %

## 2019-05-03 DIAGNOSIS — G47.10 HYPERSOMNOLENCE: ICD-10-CM

## 2019-05-03 DIAGNOSIS — G47.33 OSA (OBSTRUCTIVE SLEEP APNEA): ICD-10-CM

## 2019-05-03 PROCEDURE — 95811 POLYSOM 6/>YRS CPAP 4/> PARM: CPT | Performed by: PSYCHIATRY & NEUROLOGY

## 2019-05-03 PROCEDURE — 95811 POLYSOM 6/>YRS CPAP 4/> PARM: CPT

## 2019-05-06 ENCOUNTER — OFFICE VISIT (OUTPATIENT)
Dept: BARIATRICS/WEIGHT MGMT | Facility: CLINIC | Age: 69
End: 2019-05-06

## 2019-05-06 ENCOUNTER — LAB (OUTPATIENT)
Dept: LAB | Facility: HOSPITAL | Age: 69
End: 2019-05-06

## 2019-05-06 ENCOUNTER — CLINICAL SUPPORT NO REQUIREMENTS (OUTPATIENT)
Dept: CARDIOLOGY | Facility: CLINIC | Age: 69
End: 2019-05-06

## 2019-05-06 ENCOUNTER — OFFICE VISIT (OUTPATIENT)
Dept: CARDIOLOGY | Facility: CLINIC | Age: 69
End: 2019-05-06

## 2019-05-06 VITALS
WEIGHT: 230.2 LBS | SYSTOLIC BLOOD PRESSURE: 181 MMHG | DIASTOLIC BLOOD PRESSURE: 104 MMHG | HEIGHT: 67 IN | HEART RATE: 65 BPM | BODY MASS INDEX: 36.13 KG/M2 | OXYGEN SATURATION: 97 % | TEMPERATURE: 98.7 F

## 2019-05-06 VITALS
HEIGHT: 67 IN | BODY MASS INDEX: 35.94 KG/M2 | SYSTOLIC BLOOD PRESSURE: 138 MMHG | HEART RATE: 73 BPM | WEIGHT: 229 LBS | DIASTOLIC BLOOD PRESSURE: 80 MMHG | OXYGEN SATURATION: 94 %

## 2019-05-06 DIAGNOSIS — E55.9 VITAMIN D DEFICIENCY: ICD-10-CM

## 2019-05-06 DIAGNOSIS — I49.5 SSS (SICK SINUS SYNDROME) (HCC): ICD-10-CM

## 2019-05-06 DIAGNOSIS — G47.33 OBSTRUCTIVE SLEEP APNEA SYNDROME: ICD-10-CM

## 2019-05-06 DIAGNOSIS — E08.65 DIABETES MELLITUS DUE TO UNDERLYING CONDITION WITH HYPERGLYCEMIA, WITHOUT LONG-TERM CURRENT USE OF INSULIN (HCC): ICD-10-CM

## 2019-05-06 DIAGNOSIS — I10 ESSENTIAL HYPERTENSION: Primary | ICD-10-CM

## 2019-05-06 DIAGNOSIS — Z95.0 PACEMAKER: Primary | ICD-10-CM

## 2019-05-06 DIAGNOSIS — I10 ESSENTIAL HYPERTENSION: ICD-10-CM

## 2019-05-06 DIAGNOSIS — Z98.84 HISTORY OF ROUX-EN-Y GASTRIC BYPASS: ICD-10-CM

## 2019-05-06 DIAGNOSIS — R60.9 PERIPHERAL EDEMA: ICD-10-CM

## 2019-05-06 DIAGNOSIS — E66.01 MORBID OBESITY DUE TO EXCESS CALORIES (HCC): ICD-10-CM

## 2019-05-06 DIAGNOSIS — Z98.84 HISTORY OF ROUX-EN-Y GASTRIC BYPASS: Primary | ICD-10-CM

## 2019-05-06 DIAGNOSIS — E78.5 HYPERLIPIDEMIA, UNSPECIFIED HYPERLIPIDEMIA TYPE: ICD-10-CM

## 2019-05-06 DIAGNOSIS — Z95.0 PACEMAKER: ICD-10-CM

## 2019-05-06 DIAGNOSIS — E08.00 DIABETES MELLITUS DUE TO UNDERLYING CONDITION WITH HYPEROSMOLARITY WITHOUT COMA, WITHOUT LONG-TERM CURRENT USE OF INSULIN (HCC): ICD-10-CM

## 2019-05-06 PROBLEM — G47.30 SLEEP APNEA: Status: ACTIVE | Noted: 2019-05-06

## 2019-05-06 LAB
FOLATE SERPL-MCNC: 7.61 NG/ML (ref 4.78–24.2)
IRON 24H UR-MRATE: 54 MCG/DL (ref 42–180)
VIT B12 BLD-MCNC: 876 PG/ML (ref 239–931)

## 2019-05-06 PROCEDURE — 93000 ELECTROCARDIOGRAM COMPLETE: CPT | Performed by: INTERNAL MEDICINE

## 2019-05-06 PROCEDURE — 84425 ASSAY OF VITAMIN B-1: CPT | Performed by: SURGERY

## 2019-05-06 PROCEDURE — 99214 OFFICE O/P EST MOD 30 MIN: CPT | Performed by: SURGERY

## 2019-05-06 PROCEDURE — 93283 PRGRMG EVAL IMPLANTABLE DFB: CPT | Performed by: INTERNAL MEDICINE

## 2019-05-06 PROCEDURE — 84446 ASSAY OF VITAMIN E: CPT | Performed by: SURGERY

## 2019-05-06 PROCEDURE — 99214 OFFICE O/P EST MOD 30 MIN: CPT | Performed by: INTERNAL MEDICINE

## 2019-05-06 PROCEDURE — 82607 VITAMIN B-12: CPT | Performed by: SURGERY

## 2019-05-06 PROCEDURE — 82525 ASSAY OF COPPER: CPT | Performed by: SURGERY

## 2019-05-06 PROCEDURE — 36415 COLL VENOUS BLD VENIPUNCTURE: CPT

## 2019-05-06 PROCEDURE — 83540 ASSAY OF IRON: CPT | Performed by: SURGERY

## 2019-05-06 PROCEDURE — 84590 ASSAY OF VITAMIN A: CPT | Performed by: SURGERY

## 2019-05-06 PROCEDURE — 82746 ASSAY OF FOLIC ACID SERUM: CPT | Performed by: SURGERY

## 2019-05-06 RX ORDER — CALCIPOTRIENE 50 UG/G
CREAM TOPICAL AS NEEDED
COMMUNITY

## 2019-05-06 RX ORDER — ALBUTEROL SULFATE 90 UG/1
2 AEROSOL, METERED RESPIRATORY (INHALATION) DAILY
COMMUNITY
End: 2019-09-23 | Stop reason: ALTCHOICE

## 2019-05-06 NOTE — PROGRESS NOTES
Referring Provider: Zora Jackson MD    Reason for Follow-up Visit: HTN    Subjective .   Chief Complaint:   Chief Complaint   Patient presents with   • Hypertension     pt does not check bp at home.  pt states she does not notice any changes with bp.   • Hyperlipidemia     pt had labs done 1/8/19 LDL is 110 she is not on medication for cholesterol   • Pacemaker Check     had checked today.   • Results     pt had a echo done 3/4/19.   • Shortness of Breath     pt states she is still having sob that happens every day, she gets dizzy and tired.   • Daytime Sleepiness     pt had a sleep study done friday.         History of present illness:  Veronica Stewart is a 68 y.o. yo female with history of SSS, s/p pacemaker in the past. Recently had problems with hypertension but better now. Has chronic right sided chest pain        History  Past Medical History:   Diagnosis Date   • Anxiety    • Blood clot in vein    • Arben-tachy syndrome (CMS/HCC)    • Bradycardia    • Breath shortness    • Burn     left leg   • Cardiac pacemaker     11/09 [facility]   • Chest pain    • Chronic fatigue    • Depression    • Diabetes mellitus (CMS/HCC)    • Dizziness    • Fatigue    • Follow-up exam    • Heart burn    • Hypercoagulable state, primary (CMS/HCC)     LUPUS ANTI-COAG   • Hyperlipidemia    • Hypertension    • Liver disease    • Shortness of breath    • Sleep apnea    • Stroke (CMS/HCC)    • Syncope    • Tachy-arben syndrome (CMS/HCC)    ,   Past Surgical History:   Procedure Laterality Date   • APPENDECTOMY     • CATARACT EXTRACTION     • CHOLECYSTECTOMY     • HERNIA REPAIR     • HYSTERECTOMY     • OOPHORECTOMY     • PACEMAKER IMPLANTATION     • REPLACEMENT TOTAL KNEE Right 03/26/2018   • TRAM-EN-Y PROCEDURE  2002    Dr Dahiana [facility]   • SPLENECTOMY     ,   Family History   Problem Relation Age of Onset   • Coronary artery disease Mother    • Hyperlipidemia Mother    • Anemia Mother    • Cervical cancer Mother    •  Kidney failure Father    • Heart failure Father    • Fibromyalgia Sister    • Anemia Sister    • Clotting disorder Sister    • Alcohol abuse Brother    • Cancer Maternal Grandmother    • Diabetes Maternal Grandmother    • Heart failure Maternal Grandfather    • No Known Problems Paternal Grandmother    • No Known Problems Paternal Grandfather    • Colon cancer Brother    ,   Social History     Tobacco Use   • Smoking status: Never Smoker   • Smokeless tobacco: Never Used   Substance Use Topics   • Alcohol use: No   • Drug use: No   ,     Medications  Current Outpatient Medications   Medication Sig Dispense Refill   • ADVAIR DISKUS 250-50 MCG/DOSE DISKUS 2 (two) times a day.     • albuterol sulfate  (90 Base) MCG/ACT inhaler Inhale 2 puffs Every 4 (Four) Hours As Needed for Wheezing.     • aspirin 81 MG EC tablet daily.     • bumetanide (BUMEX) 1 MG tablet Take 1 mg by mouth Daily.     • calcipotriene (DOVONEX) 0.005 % cream Apply  topically to the appropriate area as directed As Needed.     • clobetasol (TEMOVATE) 0.05 % cream Apply  topically 2 (Two) Times a Day.     • CloNIDine (CATAPRES) 0.1 MG tablet Take 0.1 mg by mouth 2 (Two) Times a Day As Needed.     • cyanocobalamin 1000 MCG/ML injection Inject 1,000 mcg into the shoulder, thigh, or buttocks Every 28 (Twenty-Eight) Days.     • diclofenac (VOLTAREN) 1 % gel gel Apply 4 g topically to the appropriate area as directed 2 (Two) Times a Day.     • ergocalciferol (ERGOCALCIFEROL) 51050 UNITS capsule Take 1 capsule by mouth 1 (One) Time Per Week. 4 capsule 3   • escitalopram (LEXAPRO) 10 MG tablet Take 10 mg by mouth Daily.     • gabapentin (NEURONTIN) 300 MG capsule 3 (Three) Times a Day.     • HYDROmorphone (DILAUDID) 2 MG tablet Take 2 mg by mouth Every 4 (Four) Hours As Needed for Moderate Pain .     • levothyroxine (SYNTHROID, LEVOTHROID) 100 MCG tablet Take  by mouth.     • lisinopril (PRINIVIL,ZESTRIL) 20 MG tablet Take 20 mg by mouth Daily.     •  "metoprolol tartrate (LOPRESSOR) 25 MG tablet Take 25 mg by mouth 2 (Two) Times a Day.     • Multiple Vitamins-Minerals (CENTRUM MULTIGUMMIES) chewable tablet Chew Daily.     • pantoprazole (PROTONIX) 40 MG EC tablet 2 (two) times a day.     • potassium chloride (K-DUR,KLOR-CON) 10 MEQ CR tablet Take  by mouth.     • raNITIdine (ZANTAC) 150 MG tablet Take 150 mg by mouth Every 12 (Twelve) Hours As Needed for Heartburn.     • tiotropium (SPIRIVA HANDIHALER) 18 MCG per inhalation capsule Place 18 mcg into inhaler and inhale.     • warfarin (COUMADIN) 7.5 MG tablet Take 7.5 mg by mouth. Take 7.5mg on Monday, Wednesday, Friday.  Takes 15mg on all other days.       No current facility-administered medications for this visit.        Allergies:  Insect extract allergy skin test; Bee venom; Other; Percocet [oxycodone-acetaminophen]; Prednisone; Ultram [tramadol hcl]; and Hydrocodone-acetaminophen    Review of Systems  Review of Systems   HENT: Negative for nosebleeds.    Cardiovascular: Positive for chest pain. Negative for claudication, dyspnea on exertion, irregular heartbeat, leg swelling, near-syncope, orthopnea, palpitations, paroxysmal nocturnal dyspnea and syncope.   Respiratory: Negative for cough, hemoptysis and shortness of breath.    Gastrointestinal: Negative for dysphagia, hematemesis and melena.   Genitourinary: Negative for hematuria.   Neurological: Positive for dizziness.   All other systems reviewed and are negative.      Objective     Physical Exam:  /80 (BP Location: Left arm, Patient Position: Sitting, Cuff Size: Adult)   Pulse 73   Ht 170.2 cm (67\")   Wt 104 kg (229 lb)   SpO2 94%   BMI 35.87 kg/m²   Physical Exam   Constitutional: She is oriented to person, place, and time. She appears well-developed and well-nourished. No distress.   HENT:   Head: Normocephalic and atraumatic.   Eyes: No scleral icterus.   Neck: Normal range of motion.   Cardiovascular: Normal rate, regular rhythm and " normal heart sounds. Exam reveals no gallop and no friction rub.   No murmur heard.  Pulmonary/Chest: Effort normal and breath sounds normal. No respiratory distress. She has no wheezes. She has no rales.   Abdominal: Soft. Bowel sounds are normal. She exhibits no distension. There is no tenderness.   Musculoskeletal: She exhibits edema (nonpitting).   Neurological: She is alert and oriented to person, place, and time. No cranial nerve deficit.   Skin: Skin is warm and dry. She is not diaphoretic. No erythema.   Psychiatric: She has a normal mood and affect. Her behavior is normal.       Results Review:    ECG 12 Lead  Date/Time: 5/6/2019 9:38 AM  Performed by: Ronny Lowe MD  Authorized by: Ronny Lowe MD   Comparison: compared with previous ECG   Similar to previous ECG  Rhythm: sinus rhythm and paced  Rate: normal  Conduction: conduction normal  ST Segments: ST segments normal  T Waves: T waves normal  QRS axis: normal  Other findings: left ventricular hypertrophy    Clinical impression: abnormal EKG            Office Visit on 02/12/2019   Component Date Value Ref Range Status   • BSA 03/01/2019 2.1  m^2 Final   • IVSd 03/01/2019 0.9  cm Final   • LVIDd 03/01/2019 4.2  cm Final   • LVIDs 03/01/2019 2.6  cm Final   • LVPWd 03/01/2019 0.9  cm Final   • IVS/LVPW 03/01/2019 1.0   Final   • FS 03/01/2019 38.1  % Final   • EDV(Teich) 03/01/2019 78.6  ml Final   • ESV(Teich) 03/01/2019 24.6  ml Final   • EF(Teich) 03/01/2019 68.7  % Final   • EDV(cubed) 03/01/2019 74.1  ml Final   • ESV(cubed) 03/01/2019 17.6  ml Final   • EF(cubed) 03/01/2019 76.3  % Final   • LV mass(C)d 03/01/2019 118.7  grams Final   • LV mass(C)dI 03/01/2019 55.8  grams/m^2 Final   • SV(Teich) 03/01/2019 54.0  ml Final   • SI(Teich) 03/01/2019 25.4  ml/m^2 Final   • SV(cubed) 03/01/2019 56.5  ml Final   • SI(cubed) 03/01/2019 26.6  ml/m^2 Final   • MV Diam 03/01/2019 3.3  cm Final   • Ao root diam 03/01/2019 2.9  cm Final   • Ao root area  03/01/2019 6.6  cm^2 Final   • LA dimension 03/01/2019 4.5  cm Final   • LA/Ao 03/01/2019 1.6   Final   • LVOT diam 03/01/2019 2.0  cm Final   • LVOT area 03/01/2019 3.1  cm^2 Final   • LVOT area(traced) 03/01/2019 3.1  cm^2 Final   • LVLd ap4 03/01/2019 7.3  cm Final   • EDV(MOD-sp4) 03/01/2019 114.0  ml Final   • LVLs ap4 03/01/2019 6.2  cm Final   • ESV(MOD-sp4) 03/01/2019 47.2  ml Final   • EF(MOD-sp4) 03/01/2019 58.6  % Final   • SV(MOD-sp4) 03/01/2019 66.8  ml Final   • SI(MOD-sp4) 03/01/2019 31.4  ml/m^2 Final   • Ao root area (BSA corrected) 03/01/2019 1.4   Final   • LV Trevino Vol (BSA corrected) 03/01/2019 53.6  ml/m^2 Final   • LV Sys Vol (BSA corrected) 03/01/2019 22.2  ml/m^2 Final   • MV area (1 diam) 03/01/2019 8.6  cm^2 Final   • MV Flow area(1diam) 03/01/2019 8.6  cm^2 Final   • Ao pk kat 03/01/2019 107.0  cm/sec Final   • Ao max PG 03/01/2019 4.6  mmHg Final   • Ao max PG (full) 03/01/2019 2.8  mmHg Final   • Ao V2 mean 03/01/2019 71.2  cm/sec Final   • Ao mean PG 03/01/2019 2.0  mmHg Final   • Ao mean PG (full) 03/01/2019 1.0  mmHg Final   • Ao V2 VTI 03/01/2019 23.2  cm Final   • DANILO(I,A) 03/01/2019 2.5  cm^2 Final   • DANILO(I,D) 03/01/2019 2.5  cm^2 Final   • DANILO(V,A) 03/01/2019 1.9  cm^2 Final   • DANILO(V,D) 03/01/2019 1.9  cm^2 Final   • LV V1 max PG 03/01/2019 1.8  mmHg Final   • LV V1 mean PG 03/01/2019 1.0  mmHg Final   • LV V1 max 03/01/2019 65.1  cm/sec Final   • LV V1 mean 03/01/2019 49.6  cm/sec Final   • LV V1 VTI 03/01/2019 18.8  cm Final   • MR max kat 03/01/2019 651.3  cm/sec Final   • MR max PG 03/01/2019 170.8  mmHg Final   • MR mean kat 03/01/2019 510.5  cm/sec Final   • MR mean PG 03/01/2019 118.5  mmHg Final   • MR VTI 03/01/2019 250.5  cm Final   • MR PISA 03/01/2019 1.6  cm^2 Final   • MR flow rate 03/01/2019 60.5  cm^3/sec Final   • MR ERO 03/01/2019 0.09  cm^2 Final   • MR volume 03/01/2019 23.3  ml Final   • MR GUILLEA radius 03/01/2019 0.5  cm Final   • MR alias kat 03/01/2019  38.5  cm/sec Final   • SV(Ao) 03/01/2019 153.2  ml Final   • SI(Ao) 03/01/2019 72.1  ml/m^2 Final   • SV(LVOT) 03/01/2019 59.1  ml Final   • SI(LVOT) 03/01/2019 27.8  ml/m^2 Final   • PI end-d jimi 03/01/2019 113.0  cm/sec Final   • TR max jimi 03/01/2019 278.0  cm/sec Final   • RVSP(TR) 03/01/2019 35.9  mmHg Final   • RAP systole 03/01/2019 5.0  mmHg Final   • BH CV ECHO DARLENE - BZI_BMI 03/01/2019 35.2  kilograms/m^2 Final   • BH CV ECHO DARLENE - BSA([place]) 03/01/2019 2.2  m^2 Final   • BH CV ECHO DARLENE - BZI_METRIC_WEIGHT 03/01/2019 102.1  kg Final   •  CV ECHO DARLENE - BZI_METRIC_HEIGHT 03/01/2019 170.2  cm Final   • Target HR (85%) 03/01/2019 128  bpm Final   • Max. Pred. HR (100%) 03/01/2019 151  bpm Final   • LA volume 03/01/2019 113.0  cm3 Final   • LA Volume Index 03/01/2019 53.1  mL/m2 Final   • Lat Peak E' Jimi 03/01/2019 5.2  cm/sec Final   • Med Peak E' Jimi 03/01/2019 5.00  cm/sec Final   • Echo EF Estimated 03/01/2019 60  % Final       Assessment/Plan   Veronica was seen today for hypertension, hyperlipidemia, pacemaker check, results, shortness of breath and daytime sleepiness.    Diagnoses and all orders for this visit:    Essential hypertension, much better control    Pacemaker, battery near ELKIN    Morbid obesity due to excess calories (CMS/HCC), followed by Dr. Yates    Peripheral edema, chronic due to previous DVT    Other orders  -     ECG 12 Lead        Patient's Body mass index is 35.87 kg/m². BMI is above normal parameters. Recommendations include: referral to a weight management program in the past.

## 2019-05-06 NOTE — PROGRESS NOTES
Dual Chamber Pacemaker Evaluation Report  IN OFFICE INTERROGATION    May 6, 2019    Primary Cardiologist: Mynor  : Medtronic Model: EnRhythm Z8428LY  Implant date: 11/13/2009    Reason for evaluation: battery check  Indication for pacemaker: sick sinus syndrome    Measurements  Atrial sensing - P wave: 2.2 mV  Atrial threshold: 0.5V@ 0.4ms  Atrial lead impedance: 536 ohms  Ventricular sensing - R wave: 4.4 mV  Ventricular threshold: 0.5 V @ 0.4 ms  Ventricular lead impedance:   544 ohms     Diagnostic Data  Atrial paced: 87.9 %  Ventricular paced: 0.6 %    Episodes recorded since 1/15/2019:  Paroxysmal AF, burden < 0.1%, anticoagulated with coumadin.  SVT noted, longest duration 6 seconds, rates up to 191 bpm.    Battery status: intensify follow up, 2.86V      Final Parameters  Mode:  AAIR+  Lower rate: 60 bpm   Upper rate: 130 bpm  AV Delay: paced- 180 ms  Sensed-150 ms  Atrial - Amplitude: 1.5 V   Pulse width: 0.4 ms   Sensitivity: 0.6 mV     Ventricular - Amplitude: 2 V  Pulse width: 0.4 ms  Sensitivity: 0.9 mV    Changes made: Normal redd atrial output changed to 1.5V; normal redd ventricular output changed to 2V  Conclusions: normal pacemaker function, stable pacing and sensing thresholds and battery nearing RRT    Follow up: 2 months via [facility]

## 2019-05-06 NOTE — PROGRESS NOTES
Subjective   Veronica Stewart is a 68 y.o. female.     Veronica is post op from a gastric bypass since 2002.  She is currently on her regular diet.  She states she eats twice a day with portion sizes of about a cup.  She consumes at least 64 ounces of water and is not measuring her total proteins.  She is concerned that she has been gaining weight.  She states that when she eats she feels weak and this is the reason why she does not like to eat more than twice a day.    Review Of Systems:  General ROS: positive for  - chills, fatigue, fever, sleep disturbance and weight gain  Gastrointestinal ROS: positive for - abdominal pain, heartburn and nausea/vomiting    The following portions of the patient's history were reviewed and updated as appropriate: allergies, current medications, past medical history, past surgical history and problem list.    Objective   There were no vitals taken for this visit.    General Appearance:  awake, alert, oriented, in no acute distress  Lungs:  Normal expansion.  Clear to auscultation.  No rales, rhonchi, or wheezing.  Heart:  Heart sounds are normal.  Regular rate and rhythm without murmur, gallop or rub.  Abdomen:  Soft, non-tender, normal bowel sounds; no bruits, organomegaly or masses.  Abnormal shape: obese      Assessment/Plan     Encounter Diagnoses   Name Primary?   • History of Yamilex-en-Y gastric bypass Yes   • Diabetes mellitus due to underlying condition with hyperosmolarity without coma, without long-term current use of insulin (CMS/[facility])    • Essential hypertension    • Hyperlipidemia, unspecified hyperlipidemia type    • Obstructive sleep apnea syndrome    • Vitamin D deficiency        Veronica Stewart and I discussed the importance of changing behavior for consistent and successful weight loss.  We first reviewed again the definition of a meal which is defined as portion sizes less than a half a cup and those portions incorporating a protein.  Specifically the protein should fill half of  that volume.  I also explained that she should attempt to consume 4 meals as defined above daily and to avoid snacking or grazing.  She should also be mindful of adequate hydration by consuming at least 64 oz of water daily and incorporation of a regular exercise regimen.   I discussed the importance of taking her multivitamins as directed.  The patient struggles to get an breakfast daily due to feeling fatigued after eating per her statement.  I recommended that she consider at least having a protein shake that is diluted with either fat-free milk or almond milk or water to start off her day and to obtain adequate daily protein.  I have also obtained her vitamin panel due to the history of having a gastric bypass.  With regards to her comorbid conditions associated with obesity her vitamin D levels are still low from her last labs and she is currently stable on her diabetic, antihypertensive anti-lipidemic medications.    She is to return to our office 1 year or as needed.    05/06/19  12:01 PM  Patient Care Team:  Zora Jackson MD as PCP - General  Zora Jackson MD as PCP - Family Medicine  Zora Jackson MD as PCP - [facility]  Ronny Lowe MD as Cardiologist (Cardiology)  Shaji Nugent MD as Consulting Physician (Orthopedic Surgery)  Tarik Krishnamurthy MD as Consulting Physician (Neurology)  Thomas Yates MD FACS

## 2019-05-08 LAB — COPPER SERPL-MCNC: 135 UG/DL (ref 72–166)

## 2019-05-09 LAB — VIT B1 BLD-SCNC: 72.7 NMOL/L (ref 66.5–200)

## 2019-05-10 LAB
A-TOCOPHEROL VIT E SERPL-MCNC: 10.8 MG/L (ref 9–29)
GAMMA TOCOPHEROL SERPL-MCNC: 1.3 MG/L (ref 0.5–4.9)
VIT A SERPL-MCNC: 42.7 UG/DL (ref 22–69.5)

## 2019-05-16 DIAGNOSIS — M81.0 POSTMENOPAUSAL OSTEOPOROSIS: ICD-10-CM

## 2019-05-20 LAB
CALCIUM SERPL-MCNC: 9.3 MG/DL (ref 8.8–10.2)
VITAMIN D 25-HYDROXY: 15.2 NG/ML

## 2019-05-21 ENCOUNTER — TELEPHONE (OUTPATIENT)
Dept: INTERNAL MEDICINE | Age: 69
End: 2019-05-21

## 2019-05-21 NOTE — TELEPHONE ENCOUNTER
Spoke with patient, she states she hasn't checked her INR recently, said she has been too busy, but that she would check it today. I advised patient that she really needs to check it because she was too thick last time. Patient voiced understanding.

## 2019-06-05 ENCOUNTER — TELEPHONE (OUTPATIENT)
Dept: INTERNAL MEDICINE | Age: 69
End: 2019-06-05

## 2019-06-05 ENCOUNTER — DOCUMENTATION (OUTPATIENT)
Dept: NEUROLOGY | Facility: CLINIC | Age: 69
End: 2019-06-05

## 2019-06-05 ENCOUNTER — OFFICE VISIT (OUTPATIENT)
Dept: NEUROLOGY | Facility: CLINIC | Age: 69
End: 2019-06-05

## 2019-06-05 VITALS
RESPIRATION RATE: 18 BRPM | SYSTOLIC BLOOD PRESSURE: 130 MMHG | DIASTOLIC BLOOD PRESSURE: 80 MMHG | HEIGHT: 67 IN | HEART RATE: 70 BPM | WEIGHT: 237 LBS | BODY MASS INDEX: 37.2 KG/M2

## 2019-06-05 DIAGNOSIS — G47.33 OBSTRUCTIVE SLEEP APNEA SYNDROME: Primary | ICD-10-CM

## 2019-06-05 DIAGNOSIS — E53.8 B12 DEFICIENCY: ICD-10-CM

## 2019-06-05 DIAGNOSIS — G47.34 NOCTURNAL HYPOXIA: ICD-10-CM

## 2019-06-05 DIAGNOSIS — E55.9 VITAMIN D DEFICIENCY: ICD-10-CM

## 2019-06-05 DIAGNOSIS — Z86.718 HISTORY OF DVT (DEEP VEIN THROMBOSIS): ICD-10-CM

## 2019-06-05 DIAGNOSIS — E11.21 TYPE II DIABETES MELLITUS WITH NEPHROPATHY (HCC): ICD-10-CM

## 2019-06-05 DIAGNOSIS — G47.10 HYPERSOMNIA: ICD-10-CM

## 2019-06-05 DIAGNOSIS — R79.89 LOW VITAMIN D LEVEL: ICD-10-CM

## 2019-06-05 DIAGNOSIS — R40.0 SOMNOLENCE: ICD-10-CM

## 2019-06-05 DIAGNOSIS — R53.1 WEAKNESS: ICD-10-CM

## 2019-06-05 LAB
ALBUMIN SERPL-MCNC: 4.2 G/DL (ref 3.5–5.2)
ALP BLD-CCNC: 106 U/L (ref 35–104)
ALT SERPL-CCNC: 12 U/L (ref 5–33)
ANION GAP SERPL CALCULATED.3IONS-SCNC: 13 MMOL/L (ref 7–19)
AST SERPL-CCNC: 25 U/L (ref 5–32)
BASOPHILS ABSOLUTE: 0 K/UL (ref 0–0.2)
BASOPHILS RELATIVE PERCENT: 0.7 % (ref 0–1)
BILIRUB SERPL-MCNC: 0.3 MG/DL (ref 0.2–1.2)
BUN BLDV-MCNC: 23 MG/DL (ref 8–23)
CALCIUM SERPL-MCNC: 9.1 MG/DL (ref 8.8–10.2)
CHLORIDE BLD-SCNC: 107 MMOL/L (ref 98–111)
CHOLESTEROL, TOTAL: 216 MG/DL (ref 160–199)
CO2: 25 MMOL/L (ref 22–29)
CREAT SERPL-MCNC: 0.9 MG/DL (ref 0.5–0.9)
CREATININE URINE: 122.2 MG/DL (ref 4.2–622)
EOSINOPHILS ABSOLUTE: 0.1 K/UL (ref 0–0.6)
EOSINOPHILS RELATIVE PERCENT: 1.6 % (ref 0–5)
GFR NON-AFRICAN AMERICAN: >60
GLUCOSE BLD-MCNC: 80 MG/DL (ref 74–109)
HBA1C MFR BLD: 5.6 % (ref 4–6)
HCT VFR BLD CALC: 37.1 % (ref 37–47)
HDLC SERPL-MCNC: 87 MG/DL (ref 65–121)
HEMOGLOBIN: 11.4 G/DL (ref 12–16)
LDL CHOLESTEROL CALCULATED: 117 MG/DL
LYMPHOCYTES ABSOLUTE: 1.4 K/UL (ref 1.1–4.5)
LYMPHOCYTES RELATIVE PERCENT: 33 % (ref 20–40)
MCH RBC QN AUTO: 28.2 PG (ref 27–31)
MCHC RBC AUTO-ENTMCNC: 30.7 G/DL (ref 33–37)
MCV RBC AUTO: 91.8 FL (ref 81–99)
MICROALBUMIN UR-MCNC: 1.7 MG/DL (ref 0–19)
MICROALBUMIN/CREAT UR-RTO: 13.9 MG/G
MONOCYTES ABSOLUTE: 0.5 K/UL (ref 0–0.9)
MONOCYTES RELATIVE PERCENT: 11.8 % (ref 0–10)
NEUTROPHILS ABSOLUTE: 2.3 K/UL (ref 1.5–7.5)
NEUTROPHILS RELATIVE PERCENT: 52.7 % (ref 50–65)
PDW BLD-RTO: 15.6 % (ref 11.5–14.5)
PLATELET # BLD: 201 K/UL (ref 130–400)
PMV BLD AUTO: 12.4 FL (ref 9.4–12.3)
POTASSIUM SERPL-SCNC: 4.5 MMOL/L (ref 3.5–5)
RBC # BLD: 4.04 M/UL (ref 4.2–5.4)
SODIUM BLD-SCNC: 145 MMOL/L (ref 136–145)
TOTAL PROTEIN: 7.9 G/DL (ref 6.6–8.7)
TRIGL SERPL-MCNC: 59 MG/DL (ref 0–149)
TSH SERPL DL<=0.05 MIU/L-ACNC: 3.56 UIU/ML (ref 0.27–4.2)
VITAMIN B-12: 314 PG/ML (ref 211–946)
VITAMIN D 25-HYDROXY: 14.4 NG/ML
WBC # BLD: 4.3 K/UL (ref 4.8–10.8)

## 2019-06-05 PROCEDURE — 99214 OFFICE O/P EST MOD 30 MIN: CPT | Performed by: NURSE PRACTITIONER

## 2019-06-05 NOTE — PROGRESS NOTES
Spoke with Dr. Jackson's nurse and updated her on events that transpired at office visit today.  Advised him that I would fax over a detailed office note for Dr. Jackson to review.  She voiced understanding and states that she will have Dr. Jackson review.

## 2019-06-05 NOTE — PATIENT INSTRUCTIONS
Drowsy and Distracted Driving Information  Drowsy driving is when you feel sleepy and tired while driving. This makes you less alert, slower to react, and more likely to make bad decisions while driving. Falling asleep at the wheel can cause you to drift off the road at high speed, and it puts you, your passengers, people in other vehicles, and pedestrians in great danger.  Distracted driving is when you do another activity while driving, which can cause you to lose focus on driving safely. Distractions such as eating, drinking, talking on the phone, or texting may cause you to take your eyes off the road or your hands of the wheel.  What lifestyle changes can be made to avoid drowsy driving?  · Get 7?8 hours of sleep every night. If you have trouble sleeping, see your health care provider to determine whether you may have a sleep disorder, such as insomnia or sleep apnea.  · Pay attention to warning signs that you are drowsy. These include yawning, drifting out of your garett, missing an exit, or hitting the rumble strip on the side of the road.  · Do not drink alcohol or take a drug or medicine that makes you drowsy before driving.  ? Read medicine labels and talk with your health care provider to find out whether medicines that you take may make you drowsy.  · Try not to drive alone at night or in the late afternoon. Drowsy driving is common among people who drive alone, and it happens most often in the late afternoon and night.  · Drink 1?2 cups of coffee or have an energy drink before driving, if you are drowsy. This may reduce drowsiness, but only for a short time.  · If you become drowsy while driving, pull over to a safe rest area and nap for at least 20 minutes.  What lifestyle changes can be made to avoid distracted driving?  · Do not use your phone to text, e-mail, or talk while driving. Many states have laws against cell phone use and texting while driving.  ? If you must use your phone, use a hands-free  device and set up voice commands. You may be able to connect your phone to your car system.  · Do not program your navigation system while driving. If you use a navigation system, get it ready before you start driving.  · Do not eat or drink while driving.  · Do not drink alcohol before driving, and do not allow anyone to drink alcohol while in your car.  · Take a driving safety course, especially if you are a young . Distracted driving is most common among young drivers.  · If you have passengers in your car, make sure that they let you focus on driving. Distracted driving often happens when there are several people in the car, especially if some people have been drinking.  Why are these changes important?  Distracted driving and drowsy driving are dangerous for you, other people in your car, other people on the road, and pedestrians. Thousands of accidents each year result from distracted and drowsy driving, and many of these accidents are fatal. Making these simple lifestyle changes can:  · Help make you a safer .  · Help prevent motor vehicle accidents, injuries, and deaths.    What can happen if changes are not made?  If you do not make these changes, you will have an increased risk for an accident. You may injure yourself or others. Injury may result in hospitalization, missing work, long-term disability, or even death. If you use your phone while driving, you may be breaking the law and may face legal consequences.  Where to find more information  · National Highway Traffic Safety Administration: www.nhtsa.gov/Driving-Safety/Drowsy-Driving/Research-on-Drowsy-Driving  · National Highway Traffic Safety Administration & U.S. Department of Transportation: www.distraction.gov  Summary  · Drowsy driving and distracted driving cause thousands of accidents each year. Many of these accidents are fatal.  · Drowsy driving is most common among people who do not get enough sleep, and it happens most often  after midnight and in the late afternoon.  · Distracted driving is most common among young drivers.  · Pay attention to warning signs like yawning, drifting out of your garett, missing an exit, or hitting the rumble strip on the side of the road.  · Do not eat, drink, text, e-mail, or talk on a phone while driving.  This information is not intended to replace advice given to you by your health care provider. Make sure you discuss any questions you have with your health care provider.  Document Released: 08/31/2017 Document Revised: 08/31/2017 Document Reviewed: 08/31/2017  Elsevier Interactive Patient Education © 2019 [employer]

## 2019-06-05 NOTE — TELEPHONE ENCOUNTER
Pt was seen by [facility] neurology today for sleep apnea. Whe she checked in she told them she felt funny & was disoriented. She saw she had dean fasting for labwork and was up all night struggling with the CPAP. She refused to them check her glucose. /80 pulse 70, alert and oriented X 3. . She hit her head on the car door last Friday. She denied headache or stroke symptoms. They advised she go to Urgent Care to be seen but she refused to do that as well. I spoke with pt. She states she is feeling better. She has an appt here 6/7/19.

## 2019-06-05 NOTE — PROGRESS NOTES
Subjective     Chief Complaint   Patient presents with   • Sleep Apnea       Veronica Stewart is a 68 y.o. female who presents today for follow-up of sleep apnea.    History of Present Illness  Ms. Stewart is a pleasant 68-year-old female who presents to the office today alone for follow-up of sleep apnea.  She was last evaluated by myself on April 29, 2019.  At that point in time she was noncompliant with her therapy secondary to inability to tolerate mask.  Her original sleep study was in 2003 here at [facility].  She also stated that she was under family stress currently that was impairing her ability to wear her machine.  She was sent for a split-night study in order to qualify for her for continued therapy and new machine and to evaluate pressure settings.  Her AHI on last compliance download at that office visit was 20.4.  At that point in time she had not worn her BiPAP machine since March 28 of 2018.  She did undergo repeat split-night study in May 2019 it was set at BiPAP ST 18/8 cm H2O.  She does have a back-up rate of 12.  Central apneas were noted.  She was diagnosed with complex sleep apnea.  Patient also had periodic leg movements.  It was noted she had approximately 25 minutes at or below 89% oxygen saturation level during that study.  She was given a machine 2 weeks ago and she had to replace that due to ineffective machine. She received a new BIPAP machine 3 days.She denies any chest pain or bloating with the machine. She denies difficulty tolerating pressure settings, inhaling or exhaling against pressures. She does complain of dry mouth with machine and puffiness in the face at times with the machine. She denies any palpitations.  She states she is having a hard time getting used to her mask.  She states that she is up all night fighting with her mask with leaking as well as fit.  She states this is causing her to be fatigued throughout the day.  As noted she is switched to 3 different mask in the last  2 weeks.  She is currently wearing a full facemask.  Her [employer] is [employer].  She has tried nasal pillows as well as a nasal mask prior to being on a full facemask currently.  Her [place] score today in the office is 17, STOP-BANG is high.  She also reported on her polysomnography test that she had weakness or drowsiness with highly emotional events.  She was to be scheduled for repeat polysomnography testing with ANTONIA OLSON after she was established on new BiPAP therapy and compliant.    Compliance report reviewed in office today.  She has had this current machine 3 days.  Out of those 3 days she is worn her machine greater than or equal to 4 hours all of them.  Her average nightly usage is 6 to 7 hours.  She is currently set up BiPAP 18/8 cm H2O with a back-up rate of 12.  Her AHI on current therapy is 15.6 over the past 3 days.    Incidentally, she states that she felt lightheaded this morning and disoriented but refused medical attention. She states that she feels this is related to fighting with machine. She states that she did hit her head on the car door Friday but did not lose consciousness. She did not seek medical attention and she does not want to seek medical attention for this. She denies any headaches.  She also states that she has not eaten this morning due to getting fasting labs and refuses to check her glucose.  We did offer to check her glucose in the office for her.  She refused this as well.  We did offer to take her down to urgent care/ER for evaluation today and she also refused this.  She is driving today. Denies stroke symptoms.       Current Outpatient Medications   Medication Sig Dispense Refill   • ADVAIR DISKUS 250-50 MCG/DOSE DISKUS 2 (two) times a day.     • albuterol sulfate  (90 Base) MCG/ACT inhaler Inhale 2 puffs Every 4 (Four) Hours As Needed for Wheezing.     • albuterol sulfate  (90 Base) MCG/ACT inhaler Inhale 2 puffs Daily.     • aspirin 81 MG EC tablet daily.      • bumetanide (BUMEX) 1 MG tablet Take 1 mg by mouth Daily.     • calcipotriene (DOVONEX) 0.005 % cream Apply  topically to the appropriate area as directed As Needed.     • clobetasol (TEMOVATE) 0.05 % cream Apply  topically 2 (Two) Times a Day.     • CloNIDine (CATAPRES) 0.1 MG tablet Take 0.1 mg by mouth 2 (Two) Times a Day As Needed.     • cyanocobalamin 1000 MCG/ML injection Inject 1,000 mcg into the shoulder, thigh, or buttocks Every 28 (Twenty-Eight) Days.     • diclofenac (VOLTAREN) 1 % gel gel Apply 4 g topically to the appropriate area as directed 2 (Two) Times a Day.     • ergocalciferol (ERGOCALCIFEROL) 29641 UNITS capsule Take 1 capsule by mouth 1 (One) Time Per Week. (Patient taking differently: Take 50,000 Units by mouth 2 (Two) Times a Day.) 4 capsule 3   • escitalopram (LEXAPRO) 10 MG tablet Take 10 mg by mouth Daily.     • gabapentin (NEURONTIN) 300 MG capsule 3 (Three) Times a Day.     • HYDROmorphone (DILAUDID) 2 MG tablet Take 2 mg by mouth Every 4 (Four) Hours As Needed for Moderate Pain .     • levothyroxine (SYNTHROID, LEVOTHROID) 100 MCG tablet Take  by mouth.     • lisinopril (PRINIVIL,ZESTRIL) 20 MG tablet Take 20 mg by mouth Daily.     • metoprolol tartrate (LOPRESSOR) 25 MG tablet Take 25 mg by mouth 2 (Two) Times a Day.     • Multiple Vitamins-Minerals (CENTRUM MULTIGUMMIES) chewable tablet Chew Daily.     • pantoprazole (PROTONIX) 40 MG EC tablet 2 (two) times a day.     • potassium chloride (K-DUR,KLOR-CON) 10 MEQ CR tablet Take  by mouth.     • raNITIdine (ZANTAC) 150 MG tablet Take 150 mg by mouth Every 12 (Twelve) Hours As Needed for Heartburn.     • tiotropium (SPIRIVA HANDIHALER) 18 MCG per inhalation capsule Place 18 mcg into inhaler and inhale.     • warfarin (COUMADIN) 7.5 MG tablet Take 7.5 mg by mouth. Take 7.5mg  1 po daily       No current facility-administered medications for this visit.        Past Medical History:   Diagnosis Date   • Anxiety    • Blood clot in vein  05/2018    Hospitalized    • Redd-tachy syndrome (CMS/HCC)    • Bradycardia    • Breath shortness    • Burn     left leg   • Cardiac pacemaker     11/09 [facility]   • Chest pain    • Chronic fatigue    • Depression    • Diabetes mellitus (CMS/HCC)    • Dizziness    • Fatigue    • Follow-up exam    • Heart burn    • Hypercoagulable state, primary (CMS/HCC)     LUPUS ANTI-COAG   • Hyperlipidemia    • Hypertension    • Liver disease    • Shortness of breath    • Sleep apnea    • Stroke (CMS/HCC)    • Syncope    • Tachy-redd syndrome (CMS/HCC)        Past Surgical History:   Procedure Laterality Date   • APPENDECTOMY     • CATARACT EXTRACTION     • CHOLECYSTECTOMY     • HERNIA REPAIR     • HYSTERECTOMY     • OOPHORECTOMY     • PACEMAKER IMPLANTATION     • REPLACEMENT TOTAL KNEE Right 03/26/2018    Dr doherty    • TRAM-EN-Y PROCEDURE  2002    Dr Dahiana [facility]   • SPLENECTOMY         family history includes Alcohol abuse in her brother; Anemia in her mother and sister; Cancer in her maternal grandmother; Cervical cancer in her mother; Clotting disorder in her sister; Colon cancer in her brother; Coronary artery disease in her mother; Diabetes in her maternal grandmother; Fibromyalgia in her sister; Heart failure in her father and maternal grandfather; Hyperlipidemia in her mother; Kidney failure in her father; No Known Problems in her paternal grandfather and paternal grandmother.    Social History     Tobacco Use   • Smoking status: Never Smoker   • Smokeless tobacco: Never Used   Substance Use Topics   • Alcohol use: No   • Drug use: No       Review of Systems   Constitutional: Positive for fatigue.   HENT: Positive for sinus pressure.    Eyes: Negative.    Respiratory: Negative.  Negative for apnea and shortness of breath.    Cardiovascular: Negative.  Negative for chest pain and palpitations.   Gastrointestinal: Negative.  Positive for GERD.   Endocrine: Negative.    Genitourinary: Negative.   "  Musculoskeletal: Negative.    Skin: Negative.    Allergic/Immunologic: Negative.    Neurological: Positive for weakness, light-headedness and memory problem. Negative for headache.   Hematological: Negative.    Psychiatric/Behavioral: Negative.    All other systems reviewed and are negative.      Objective     /80 (BP Location: Left arm, Patient Position: Sitting)   Pulse 70   Resp 18   Ht 170.2 cm (67\")   Wt 108 kg (237 lb)   Breastfeeding? No   BMI 37.12 kg/m² , Body mass index is 37.12 kg/m².    Physical Exam   Constitutional: She is oriented to person, place, and time. She appears well-developed and well-nourished.   HENT:   Head: Normocephalic and atraumatic.   Mallampati class III anatomy   Eyes: Pupils are equal, round, and reactive to light.   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 Neck supple.   Cardiovascular: Normal rate and regular rhythm.   Pulmonary/Chest: Effort normal and breath sounds normal.   Musculoskeletal: Normal range of motion.   Neurological: She is alert and oriented to person, place, and time.   Skin: Skin is warm and dry.   Psychiatric: She has a normal mood and affect. Her behavior is normal.         ASSESSMENT/PLAN    Veronica was seen today for sleep apnea.    Diagnoses and all orders for this visit:    Obstructive sleep apnea syndrome  -     Polysomnography 4 or more parameters with CPAP; Future  -     Urine Drug Screen - Urine, Clean Catch; Future  -     Multiple sleep latency test with CPAP; Future    Weakness  -     Polysomnography 4 or more parameters with CPAP; Future  -     Urine Drug Screen - Urine, Clean Catch; Future  -     Multiple sleep latency test with CPAP; Future    Somnolence   -     Urine Drug Screen - Urine, Clean Catch; Future    Hypersomnia   -     Multiple sleep latency test with CPAP; Future    Nocturnal hypoxia  -     Overnight Sleep Oximetry Study; Future          Allergies and all known " medications/prescriptions have been reviewed using resources available on this encounter.    Patient's Body mass index is 37.12 kg/m². BMI is above normal parameters. Recommendations include: referral to primary care.    Return in about 4 weeks (around 7/3/2019).    MEDICAL DECISION MAKIN.  Compliance report discussed in office today.  She is working with compliance on her machine.  At this point time she has had therapy for total of 2 weeks, new BiPAP machine for only 3 days.  He continues to have issues tolerating her mask.  I did advise her to contact her [employer] company for help with this.  She voices understanding.  She states she will call them today.  She also complains of fatigue related to fighting the mask all night.  She denies any difficulty tolerating pressures.  She denies any chest pain or bloating with use.  She denies any morning headaches.  She did have nocturnal hypoxia noted on most recent split-night study.  I will perform an overnight pulse oximetry on her BiPAP machine and room air when she is stable with therapy to evaluate for any continued hypoxia.  2.  Patient had reported in the past weakness or drowsiness with highly emotional events.  We will go ahead and place order for repeat polysomnography with M SLT testing.  I would like for her to be stable on her BiPAP therapy to see if this alleviates some of her symptoms prior to obtaining this test.  She is in agreement with this.  Drowsy driving precautions were discussed with patient today in the office.  She is not to be driving if she is drowsy.  3.  Patient presented to the office stating that she felt lightheaded today as well as slightly disoriented.  She refused medical attention.  We did offer to check her glucose that she was fasting for labs for her PCP today and she refused.  She also states she has her own glucometer and does not want to check her glucose.  Her blood pressure and heart rate were normal in office today.  She  was alert and oriented x3.  She denied any headache.  She also complained of periods where she felt disoriented.  She denied any weakness or numbness/tingling on one side.  She denied any speech issues.  She did state that she hit her head last Friday on her car door but did not seek medical attention.  She is on Coumadin for this.  I did discuss increased risk of bleeding tendencies on Coumadin.  I did advise her that I wanted her to leave our office and seek medical attention in the ER or urgent care for evaluation.  I did offer to transport her to these areas myself and she refused.  I did advise her should any of the symptoms continue or worsen to seek medical attention immediately.  I will send this office note to her primary care provider as she is leaving our office to had to theirs for fasting labs at this time.  I have also left a message for them to call me back to inform them of patient's complaints.  4. Counseled on multimodal approach to treatment of sleep apnea to include but not limited to diet, exercise, sleep hygiene, compliance with pap therapy. Encouraged lateral sleeping position and to avoid sedatives or alcohol close to bedtime. Risks of untreated sleep apnea were discussed to include but not limited to HTN, heart disease, stroke, cardiac arrhythmia such as AFIB, and dementia.       Terri Adams APRN

## 2019-06-07 ENCOUNTER — OFFICE VISIT (OUTPATIENT)
Dept: INTERNAL MEDICINE | Age: 69
End: 2019-06-07
Payer: MEDICARE

## 2019-06-07 ENCOUNTER — PATIENT MESSAGE (OUTPATIENT)
Dept: INTERNAL MEDICINE | Age: 69
End: 2019-06-07

## 2019-06-07 DIAGNOSIS — E11.9 TYPE 2 DIABETES MELLITUS WITHOUT COMPLICATION, UNSPECIFIED WHETHER LONG TERM INSULIN USE (HCC): Primary | ICD-10-CM

## 2019-06-07 DIAGNOSIS — E53.8 B12 DEFICIENCY: ICD-10-CM

## 2019-06-07 DIAGNOSIS — E55.9 VITAMIN D DEFICIENCY: ICD-10-CM

## 2019-06-07 DIAGNOSIS — G62.9 NEUROPATHY: ICD-10-CM

## 2019-06-07 DIAGNOSIS — Z79.899 HIGH RISK MEDICATION USE: ICD-10-CM

## 2019-06-07 DIAGNOSIS — I10 ESSENTIAL HYPERTENSION: ICD-10-CM

## 2019-06-07 DIAGNOSIS — F32.A DEPRESSION, UNSPECIFIED DEPRESSION TYPE: ICD-10-CM

## 2019-06-07 DIAGNOSIS — K21.9 GASTROESOPHAGEAL REFLUX DISEASE WITHOUT ESOPHAGITIS: ICD-10-CM

## 2019-06-07 DIAGNOSIS — Z91.09 ENVIRONMENTAL ALLERGIES: ICD-10-CM

## 2019-06-07 DIAGNOSIS — D68.59 HYPERCOAGULOPATHY (HCC): ICD-10-CM

## 2019-06-07 DIAGNOSIS — I82.91 CHRONIC DEEP VEIN THROMBOSIS (DVT) OF NON-EXTREMITY VEIN: ICD-10-CM

## 2019-06-07 DIAGNOSIS — E03.9 HYPOTHYROIDISM, UNSPECIFIED TYPE: ICD-10-CM

## 2019-06-07 DIAGNOSIS — Q87.89: ICD-10-CM

## 2019-06-07 DIAGNOSIS — I20.8 STABLE ANGINA (HCC): ICD-10-CM

## 2019-06-07 DIAGNOSIS — Q74.0: ICD-10-CM

## 2019-06-07 DIAGNOSIS — J45.909 ASTHMATIC BRONCHITIS WITHOUT COMPLICATION, UNSPECIFIED ASTHMA SEVERITY, UNSPECIFIED WHETHER PERSISTENT: ICD-10-CM

## 2019-06-07 LAB
AMPHETAMINE SCREEN, URINE: ABNORMAL
BARBITURATE SCREEN, URINE: ABNORMAL
BENZODIAZEPINE SCREEN, URINE: ABNORMAL
BUPRENORPHINE URINE: ABNORMAL
COCAINE METABOLITE SCREEN URINE: ABNORMAL
GABAPENTIN SCREEN, URINE: ABNORMAL
INR BLD: 1.59 (ref 0.88–1.18)
MDMA URINE: ABNORMAL
METHADONE SCREEN, URINE: ABNORMAL
METHAMPHETAMINE, URINE: ABNORMAL
OPIATE SCREEN URINE: ABNORMAL
OXYCODONE SCREEN URINE: ABNORMAL
PHENCYCLIDINE SCREEN URINE: ABNORMAL
PROPOXYPHENE SCREEN, URINE: ABNORMAL
PROTHROMBIN TIME: 18.3 SEC (ref 12–14.6)
THC SCREEN, URINE: ABNORMAL
TRICYCLIC ANTIDEPRESSANTS, UR: ABNORMAL

## 2019-06-07 PROCEDURE — G8400 PT W/DXA NO RESULTS DOC: HCPCS | Performed by: INTERNAL MEDICINE

## 2019-06-07 PROCEDURE — 99214 OFFICE O/P EST MOD 30 MIN: CPT | Performed by: INTERNAL MEDICINE

## 2019-06-07 PROCEDURE — 1123F ACP DISCUSS/DSCN MKR DOCD: CPT | Performed by: INTERNAL MEDICINE

## 2019-06-07 PROCEDURE — G8598 ASA/ANTIPLAT THER USED: HCPCS | Performed by: INTERNAL MEDICINE

## 2019-06-07 PROCEDURE — 3044F HG A1C LEVEL LT 7.0%: CPT | Performed by: INTERNAL MEDICINE

## 2019-06-07 PROCEDURE — 1090F PRES/ABSN URINE INCON ASSESS: CPT | Performed by: INTERNAL MEDICINE

## 2019-06-07 PROCEDURE — 80305 DRUG TEST PRSMV DIR OPT OBS: CPT | Performed by: INTERNAL MEDICINE

## 2019-06-07 PROCEDURE — G8427 DOCREV CUR MEDS BY ELIG CLIN: HCPCS | Performed by: INTERNAL MEDICINE

## 2019-06-07 PROCEDURE — 4040F PNEUMOC VAC/ADMIN/RCVD: CPT | Performed by: INTERNAL MEDICINE

## 2019-06-07 PROCEDURE — 1036F TOBACCO NON-USER: CPT | Performed by: INTERNAL MEDICINE

## 2019-06-07 PROCEDURE — 3017F COLORECTAL CA SCREEN DOC REV: CPT | Performed by: INTERNAL MEDICINE

## 2019-06-07 PROCEDURE — 2022F DILAT RTA XM EVC RTNOPTHY: CPT | Performed by: INTERNAL MEDICINE

## 2019-06-07 PROCEDURE — G8417 CALC BMI ABV UP PARAM F/U: HCPCS | Performed by: INTERNAL MEDICINE

## 2019-06-07 RX ORDER — PREGABALIN 75 MG/1
75 CAPSULE ORAL 3 TIMES DAILY
Qty: 60 CAPSULE | Refills: 3 | Status: SHIPPED | OUTPATIENT
Start: 2019-06-07 | End: 2019-06-11 | Stop reason: SDUPTHER

## 2019-06-07 NOTE — PATIENT INSTRUCTIONS
day.  You also should not take warfarin if you are are prone to bleeding because of a medical condition, such as:  · a blood cell disorder (such as low red blood cells or low platelets);  · ulcers or bleeding in your stomach, intestines, lungs, or urinary tract;  · an aneurysm or bleeding in the brain; or  · an infection of the lining of your heart. Do not take warfarin if you are pregnant, unless your doctor tells you to. Warfarin can cause birth defects, but preventing blood clots may outweigh any risks to the baby. If you are not pregnant, use effective birth control to prevent pregnancy while taking warfarin and for at least 1 month after your last dose. Tell your doctor right away if you become pregnant. Warfarin can make you bleed more easily, especially if you have ever had:  · high blood pressure or serious heart disease;  · kidney disease;  · cancer or low blood cell counts;  · an accident or surgery;  · bleeding in your stomach or intestines;  · a stroke; or  · if you are 72 or older. To make sure warfarin is safe for you, tell your doctor if you have ever had:  · diabetes;  · congestive heart failure;  · liver disease, kidney disease (or if you are on dialysis);  · a hereditary clotting deficiency; or  · low blood platelets after receiving heparin. It is not known whether warfarin passes into breast milk. Watch for signs of bruising or bleeding in the baby if you take warfarin while you are breast-feeding a baby. How should I take warfarin? Follow all directions on your prescription label. Your doctor may occasionally change your dose. Do not take warfarin in larger or smaller amounts or for longer than your doctor tells you to. Take warfarin at the same time every day, with or without food. Never take a double dose. Warfarin can make it easier for you to bleed. Seek emergency help if you have any bleeding that will not stop.    You will need frequent \"INR\" or prothrombin time tests (to measure your blood-clotting time and determine your warfarin dose). You must remain under the care of a doctor while taking warfarin. If you receive warfarin in a hospital, call or visit your doctor 3 to 7 days after you leave the hospital. Your INR will need to be tested at that time. Do not miss any follow-up appointments. Tell your doctor if you are sick with diarrhea, fever, chills, or flu symptoms, or if your body weight changes. You may need to stop taking warfarin 5 to 7 days before having any surgery, dental work, or a medical procedure. Call your doctor for instructions. Wear a medical alert tag or carry an ID card stating that you take warfarin. Any medical care provider who treats you should know that you are taking this medicine. Store at room temperature away from heat, moisture, and light. What happens if I miss a dose? Take the missed dose as soon as you remember. Skip the missed dose if it is almost time for your next scheduled dose. Do not take extra medicine to make up the missed dose. What happens if I overdose? Seek emergency medical attention or call the Poison Help line at 1-361.652.6544. An overdose can cause excessive bleeding. What should I avoid while taking warfarin? Avoid activities that may increase your risk of bleeding or injury. Use extra care to prevent bleeding while shaving or brushing your teeth. You may still bleed more easily for several days after you stop taking warfarin. Avoid making any changes in your diet without first talking to your doctor. Foods that are high in vitamin K (liver, leafy green vegetables, or vegetable oils) can make warfarin less effective. If these foods are part of your diet, eat a consistent amount on a weekly basis. Grapefruit juice, cranberry juice, red juice, and pomegranate juice may interact with warfarin and lead to unwanted side effects. Avoid the use of these juice products while taking warfarin. Avoid drinking alcohol.   Ask your doctor medicine, especially:  · other medicines to prevent blood clots;  · an antibiotic or antifungal medicine;  · supplements that contain vitamin K; or  · herbal (botanical) products --coenzyme Q10, cranberry, echinacea, garlic, ginkgo biloba, ginseng, goldenseal, or Fred's wort. This list is not complete and many other drugs can interact with warfarin. This includes prescription and over-the-counter medicines, vitamins, and herbal products. Give a list of all your medicines to any healthcare provider who treats you. Where can I get more information? Your pharmacist can provide more information about warfarin. Remember, keep this and all other medicines out of the reach of children, never share your medicines with others, and use this medication only for the indication prescribed. Every effort has been made to ensure that the information provided by [facility] [address] is accurate, up-to-date, and complete, but no guarantee is made to that effect. Drug information contained herein may be time sensitive. [facility]"[employer] information has been compiled for use by healthcare practitioners and consumers in the [place] and therefore [employer] does not warrant that uses outside of the [place] are appropriate, unless specifically indicated otherwise. [facility]'s drug information does not endorse drugs, diagnose patients or recommend therapy. [facility] drug information is an informational resource designed to assist licensed healthcare practitioners in caring for their patients and/or to serve consumers viewing this service as a supplement to, and not a substitute for, the expertise, skill, knowledge and judgment of healthcare practitioners. The absence of a warning for a given drug or drug combination in no way should be construed to indicate that the drug or drug combination is safe, effective or appropriate for any given patient.  [employer] does not assume any responsibility for any aspect of healthcare administered with the aid of information [employer] provides. The information contained herein is not intended to cover all possible uses, directions, precautions, warnings, drug interactions, allergic reactions, or adverse effects. If you have questions about the drugs you are taking, check with your doctor, nurse or pharmacist.  Copyright 0174-5028 [address]: 22.01. Revision date: 9/14/2017. Care instructions adapted under license by [facility] ([facility]). If you have questions about a medical condition or this instruction, always ask your healthcare professional. [place] [address] any warranty or liability for your use of this information.

## 2019-06-07 NOTE — PROGRESS NOTES
Visual inspection:  Deformity/amputation: absent  Skin lesions/pre-ulcerative calluses: absent  Edema: right- 2+, left- 2+    Sensory exam:  Monofilament sensation: abnormal - No sensation on both feet.   (minimum of 5 random plantar locations tested, avoiding callused areas - > 1 area with absence of sensation is + for neuropathy)    Plus at least one of the following:  Pulses: abnormal - Decreased in right foot,   Pinprick: N/A  Proprioception: N/A  Vibration (128 Hz): N/A

## 2019-06-08 VITALS
OXYGEN SATURATION: 99 % | WEIGHT: 238 LBS | HEIGHT: 67 IN | DIASTOLIC BLOOD PRESSURE: 90 MMHG | SYSTOLIC BLOOD PRESSURE: 140 MMHG | BODY MASS INDEX: 37.35 KG/M2 | HEART RATE: 61 BPM

## 2019-06-08 ASSESSMENT — ENCOUNTER SYMPTOMS
APNEA: 0
SINUS PAIN: 0
BACK PAIN: 1
ABDOMINAL DISTENTION: 0
STRIDOR: 0
EYE DISCHARGE: 0
DIARRHEA: 0
ABDOMINAL PAIN: 0
EYE ITCHING: 0
TROUBLE SWALLOWING: 0
VOMITING: 0
SINUS PRESSURE: 0
SORE THROAT: 0
SHORTNESS OF BREATH: 0
COLOR CHANGE: 0
RHINORRHEA: 1
VOICE CHANGE: 0
CHEST TIGHTNESS: 0
COUGH: 0
BLOOD IN STOOL: 0
WHEEZING: 0
CONSTIPATION: 0

## 2019-06-08 NOTE — PROGRESS NOTES
Chief Complaint   Patient presents with    Other     4 month f/u pt states she has been light headed for the last 3-4 days        HPI: Daniel Arshad is a 70 y.o. female is here for evaluation of hypertension, type II diabetes, B12 deficiency, acid reflux, atrial fibrillation, hypothyroidism, and depression. She also has a history of mount intermittent asthma, which is stable. Her blood pressure is well controlled. She denies he complaints of chest pain, chest pressure or shortness of breath. She states that sometimes, she'll feel dizzy and lightheaded, but she contributes this to environmental allergies. It only happens if she stands up suddenly. She has not been taking her clonidine on a regular basis. She only takes it as needed. She's not taking her amlodipine at all. She would like to have the amlodipine removed from her list. She does have a history of sleep apnea. She is trying to be compliant with her CPAP machine. She states that sometimes, she'll fight her mask. Sometimes, her nose gets stool stopped up. She does have some watery eyes at times. For her allergy symptoms, I have advised that she take over-the-counter Claritin. Her blood sugars are \"okay. \" She does not take them on a regular basis. Her A1c is 5.6. Sometimes, she will have some reflux-like symptoms. She is not taking her Zantac regularly. She also admits that CT and coat cause her reflux symptoms to get worse. I did tell her that she really should not be useful ETR cold in the setting of diabetes. Her vitamin D is quite low at 14. We will supplement this with ergocalciferol. Her cholesterol is 216. Her B12 level is within normal limits. She still complains of diabetic neuropathy. She no longer thinks the gabapentin is helping. She is agreeable to trying Lyrica. 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 She is taking Coumadin for stroke prophylaxis and prophylaxis for DVTs secondary to her hypercoagulable state. resource strain: None    Food insecurity:     Worry: None     Inability: None    Transportation needs:     Medical: None     Non-medical: None   Tobacco Use    Smoking status: Never Smoker    Smokeless tobacco: Never Used   Substance and Sexual Activity    Alcohol use: No    Drug use: No    Sexual activity: Not Currently   Lifestyle    Physical activity:     Days per week: None     Minutes per session: None    Stress: None   Relationships    Social connections:     Talks on phone: None     Gets together: None     Attends Christian service: None     Active member of club or organization: None     Attends meetings of clubs or organizations: None     Relationship status: None    Intimate partner violence:     Fear of current or ex partner: None     Emotionally abused: None     Physically abused: None     Forced sexual activity: None   Other Topics Concern    None   Social History Narrative    None      Family History   Problem Relation Age of Onset    Uterine Cancer Mother     Cervical Cancer Mother     Coronary Art Dis Mother     Heart Disease Father     Lung Cancer Father     Other Father         renal failure    Colon Cancer Brother     Colon Polyps Brother     Uterine Cancer Maternal Grandmother     Cervical Cancer Maternal Grandmother     Diabetes Maternal Grandmother     Cervical Cancer Sister     Other Sister         fibromyalgia    Diabetes Paternal Aunt     Esophageal Cancer Neg Hx     Liver Cancer Neg Hx     Liver Disease Neg Hx     Stomach Cancer Neg Hx     Rectal Cancer Neg Hx         Current Outpatient Medications   Medication Sig Dispense Refill    pregabalin (LYRICA) 75 MG capsule Take 1 capsule by mouth 3 times daily for 30 days.  60 capsule 3    metoprolol tartrate (LOPRESSOR) 25 MG tablet Take 25 mg by mouth      tiotropium (SPIRIVA HANDIHALER) 18 MCG inhalation capsule Inhale 1 capsule into the lungs daily 90 capsule 1    zoster recombinant adjuvanted vaccine [facility]) 50 MCG/0.5ML SUSR injection Inject 0.5 mLs into the muscle See Admin Instructions 1 dose now and repeat in 2-6 months 1 each 0    aspirin 81 MG tablet Take 81 mg by mouth daily      cloNIDine (CATAPRES) 0.1 MG tablet Take 1 tablet by mouth 2 times daily 60 tablet 3    cyanocobalamin 1000 MCG/ML injection Inject 1 mL into the muscle once for 1 dose 1 mL 0    ranitidine (ZANTAC) 150 MG tablet Take 1 tablet by mouth 2 times daily 60 tablet 5    pantoprazole sodium (PROTONIX) 40 MG PACK packet Take 1 packet by mouth every morning (before breakfast) 30 each 3    warfarin (COUMADIN) 7.5 MG tablet 2 tablets daily 150 tablet 3    bumetanide (BUMEX) 1 MG tablet Take 1 tablet by mouth daily 90 tablet 3    diclofenac sodium (VOLTAREN) 1 % GEL Apply 2 g topically 2 times daily 3 Tube 3    escitalopram (LEXAPRO) 10 MG tablet Take 1 tablet by mouth daily 90 tablet 3    levothyroxine (SYNTHROID) 100 MCG tablet Take 1 tablet by mouth Daily 90 tablet 3    lisinopril (PRINIVIL;ZESTRIL) 20 MG tablet Take 1 tablet by mouth daily 90 tablet 3    potassium chloride (KLOR-CON M) 10 MEQ extended release tablet Take 1 tablet by mouth daily 90 tablet 3    ergocalciferol (DRISDOL) 78112 units capsule Take 1 capsule by mouth every 30 days (Patient taking differently: Take 50,000 Units by mouth every 30 days Indications: 2x a week ) 12 capsule 3    calcipotriene (DOVONEX) 0.005 % cream Use topically as needed 3 Tube 3    clobetasol (TEMOVATE) 0.05 % cream Apply topically 2 times daily. 3 Tube 3    Multiple Vitamin (MULTIVITAMIN PO) Take 1 tablet by mouth daily        No current facility-administered medications for this visit.          Patient Active Problem List   Diagnosis    Vomiting    Burping    GERD (gastroesophageal reflux disease)    History of gastric bypass    Family history of colon cancer    Bloating    Enuresis    Nausea and vomiting    Stable angina ([address]    Encounter for current long-term use of anticoagulants    Primary osteoarthritis of right knee    Arthritis of knee    Essential hypertension    Hyperglycemia    MER (obstructive sleep apnea)    Slow transit constipation    Iron deficiency anemia    Restrictive airway disease    DVT, lower extremity, proximal, acute, unspecified laterality ([address]    H/O systemic lupus erythematosus (SLE)    Anticoagulated on Coumadin    Burn of abdomen wall, second degree, initial encounter    Postmenopausal osteoporosis        Review of Systems   Constitutional: Positive for fatigue. Negative for activity change, appetite change, chills, diaphoresis, fever and unexpected weight change. HENT: Positive for rhinorrhea and sneezing. Negative for congestion, ear pain, hearing loss, nosebleeds, postnasal drip, sinus pressure, sinus pain, sore throat, tinnitus, trouble swallowing and voice change. Eyes: Negative for discharge and itching. Watery eyes   Respiratory: Negative for apnea, cough, chest tightness, shortness of breath, wheezing and stridor. Cardiovascular: Positive for leg swelling. Negative for chest pain and palpitations. Left leg swelling; chronic secondary to DVT   Gastrointestinal: Negative for abdominal distention, abdominal pain, blood in stool, constipation, diarrhea and vomiting. Endocrine: Negative for cold intolerance, heat intolerance, polydipsia, polyphagia and polyuria. Genitourinary: Negative for difficulty urinating, dysuria, flank pain, frequency, hematuria and urgency. Musculoskeletal: Positive for back pain. Negative for arthralgias, gait problem, joint swelling, myalgias, neck pain and neck stiffness. She had bilateral knee pain   Skin: Negative for color change, pallor, rash and wound. Allergic/Immunologic: Positive for environmental allergies. Negative for food allergies and immunocompromised state.    Neurological: Negative for dizziness, tremors, seizures, syncope, facial asymmetry, speech difficulty, weakness, light-headedness, numbness and headaches. Hematological: Negative for adenopathy. Does not bruise/bleed easily. Psychiatric/Behavioral: Positive for dysphoric mood. Negative for agitation, confusion, decreased concentration and hallucinations. The patient is not nervous/anxious and is not hyperactive. Mood stable on Lexapro       BP (!) 140/90   Pulse 61   Ht 5' 7\" (1.702 m)   Wt 238 lb (108 kg)   SpO2 99%   BMI 37.28 kg/m²  Repeat /80  Physical Exam   Constitutional: Vital signs are normal. She appears well-developed and well-nourished. No distress. HENT:   Head: Normocephalic and atraumatic. Right Ear: External ear normal.   Left Ear: External ear normal.   Nose: Nose normal.   Mouth/Throat: Oropharyngeal exudate present. Postnasal drip   Eyes: Pupils are equal, round, and reactive to light. Conjunctivae and EOM are normal. Right eye exhibits no discharge. Left eye exhibits no discharge. Neck: Normal range of motion. No JVD present. No tracheal deviation present. No thyromegaly present. Cardiovascular: Normal rate, regular rhythm and normal heart sounds. Exam reveals no gallop and no friction rub. No murmur heard. Pulmonary/Chest: Effort normal and breath sounds normal. No stridor. No respiratory distress. She has no wheezes. She has no rales. She exhibits no tenderness. Abdominal: Soft. Bowel sounds are normal. She exhibits no distension and no mass. There is no tenderness. There is no rebound and no guarding. No hernia. Musculoskeletal: Normal range of motion. She exhibits edema. She exhibits no deformity. Lumbar back: She exhibits tenderness, pain and spasm. She exhibits normal range of motion, no bony tenderness and no swelling. Back:    Left lower extremity is swollen   Lymphadenopathy:     She has no cervical adenopathy. Neurological: She is alert. She displays normal reflexes. No cranial nerve deficit or sensory deficit.  She exhibits normal muscle tone. Coordination normal.   Skin: Skin is warm, dry and intact. Capillary refill takes less than 2 seconds. No lesion and no rash noted. She is not diaphoretic. No erythema. DIABETIC FOOT EXAM DONE. DECREASED CIRCULATION AND SENSATION BILATERALLY. PREULCERATIVE CALLOUS TO THE RIGHT HEEL   Psychiatric: She has a normal mood and affect. Her behavior is normal. Judgment and thought content normal. Her mood appears not anxious. She does not exhibit a depressed mood. Nursing note and vitals reviewed. 1. Chronic deep vein thrombosis (DVT) of non-extremity vein    2. Neuropathy    3. B12 deficiency    4. Vitamin D deficiency    5. Type 2 diabetes mellitus without complication, unspecified whether long term insulin use ([facility] [address] [facility]    6. High risk medication use        ASSESSMENT/PLAN:    53-year-old woman here for follow-up     1. Type II diabetes: A1c at goal. Patient does need to work on her diet and exercise. She admits to drinking sweet tea and Cokes. 2. Hypertension: Blood pressure is well controlled on current medication regimen. She does not take the clonidine on a daily basis. She only takes it as needed. I told her that she can continue to do so. She's no longer taking the Norvasc. We'll discontinue the Norvasc at this time. She does need to continue to watch her diet and watch her blood pressure at home. Apply as has been underlying problem for quite some time in regards to this patient. 3. Asthmatic bronchitis: Continue Spiriva    4. History of hypercoagulable state, DVTs and Coate's syndrome: Continue Coumadin. Check pro time today    5. Acid reflux: Patient not taking her Zantac as prescribed. Continue Protonix. Patient advised to take her Zantac    6. Vitamin B12 deficiency: Continue B12 supplementation    7. Depression: Doing well with Lexapro    8. Hypothyroidism: Continue levothyroxine at current dose    9. Vitamin D deficiency: Vitamin D level low at 14.  Doubt she's been taking her ergocalciferol as prescribed. Advised her to go ahead and continue take her ergocalciferol to sleep. 10. Diabetic neuropathy: Discontinue Gabapentin and try Lyrica. 11. Environmental allergies: Claritin as needed      Augie Tate was seen today for other. Diagnoses and all orders for this visit:    Chronic deep vein thrombosis (DVT) of non-extremity vein  -     Protime-INR; Future    Neuropathy  -     pregabalin (LYRICA) 75 MG capsule; Take 1 capsule by mouth 3 times daily for 30 days. B12 deficiency  -     Vitamin B12; Future    Vitamin D deficiency  -     Vitamin D 25 Hydroxy; Future    Type 2 diabetes mellitus without complication, unspecified whether long term insulin use (HCC)  -     CBC Auto Differential; Future  -     Hemoglobin A1C; Future  -     Comprehensive Metabolic Panel; Future  -     Lipid Panel; Future  -     TSH without Reflex; Future  -     Microalbumin / Creatinine Urine Ratio; Future  -      DIABETES FOOT EXAM    High risk medication use  -     POCT Rapid Drug Screen          Return in about 3 months (around 9/7/2019), or medicare wellness, for physical.     Orders Placed This Encounter   Procedures    Protime-INR     Standing Status:   Future     Number of Occurrences:   1     Standing Expiration Date:   6/7/2020     Order Specific Question:   Daily Coumadin Dose?      Answer:   7.5 daily    Vitamin B12     Standing Status:   Future     Standing Expiration Date:   6/7/2020    Vitamin D 25 Hydroxy     Standing Status:   Future     Standing Expiration Date:   6/7/2020    CBC Auto Differential     Fast 12 hours     Standing Status:   Future     Standing Expiration Date:   6/6/2020    Hemoglobin A1C     Fast 12 hours     Standing Status:   Future     Standing Expiration Date:   6/6/2020    Comprehensive Metabolic Panel     Fasting 12 hours     Standing Status:   Future     Standing Expiration Date:   6/6/2020    Lipid Panel     Standing Status:   Future     Standing Expiration Date:   6/6/2020     Order Specific Question:   Is Patient Fasting?/# of Hours     Answer:   12    TSH without Reflex     Fast 12 hours     Standing Status:   Future     Standing Expiration Date:   6/6/2020    Microalbumin / Creatinine Urine Ratio     Standing Status:   Future     Standing Expiration Date:   6/6/2020    POCT Rapid Drug Screen    KAREEN Carolina MD

## 2019-06-10 NOTE — TELEPHONE ENCOUNTER
From: Rissa Ko  Sent: 6/10/2019 3:09 PM CDT  To: [facility] Clinical Staff  Subject: RE: Visit Follow-Up Question    ----- Message from [address] [place] [address] [id number], [address] [place] [address] sent at 6/10/2019 4:09 PM EDT -----    I was told to take 71/2 mg. everyday  You can please fax my papers for my diabetic shoes [facility] Please let Dr. Ernie Weston know that my insurance will pay for the pregabalin (Lyrica) and that it's very expensive, what else could I take? Don't know what to do now. Do I still take the Amlodipine?  ----- Message -----  From: Luis Tate  Sent: 6/10/2019 3:44 PM EDT  To: Rissa Ko  Subject: RE: Visit Follow-Up Question  PT/INR was 1.59. Tell me how you take your Coumadin. I can fax an order for your diabetic shoes. Where would you like me to fax the order? [facility] for Dr. Ernie Weston    ----- Message -----   From: Rissa Ko   Sent: 6/7/2019 6:07 PM CDT   To: Genesis Glaser MD  Subject: Visit Follow-Up Question    Had my diabetic foot exam done this morning in your office and wasn't given my prescription for new diabetic shoes. Don't even know what my PT-INR was.

## 2019-06-11 ENCOUNTER — ANTI-COAG VISIT (OUTPATIENT)
Dept: INTERNAL MEDICINE | Age: 69
End: 2019-06-11
Payer: MEDICARE

## 2019-06-11 ENCOUNTER — PATIENT MESSAGE (OUTPATIENT)
Dept: INTERNAL MEDICINE | Age: 69
End: 2019-06-11

## 2019-06-11 DIAGNOSIS — G62.9 NEUROPATHY: ICD-10-CM

## 2019-06-11 DIAGNOSIS — Z79.01 ANTICOAGULATED ON COUMADIN: ICD-10-CM

## 2019-06-11 LAB — INR BLD: 1.59

## 2019-06-11 PROCEDURE — 93793 ANTICOAG MGMT PT WARFARIN: CPT | Performed by: INTERNAL MEDICINE

## 2019-06-11 RX ORDER — PREGABALIN 75 MG/1
75 CAPSULE ORAL 3 TIMES DAILY
Qty: 90 CAPSULE | Refills: 3
Start: 2019-06-11 | End: 2020-01-07 | Stop reason: SDUPTHER

## 2019-06-11 NOTE — TELEPHONE ENCOUNTER
Message sent to the patient. Requested Prescriptions     Pending Prescriptions Disp Refills    Gabapentin, Once-Daily, 300 MG TABS 90 tablet 2     Sig: Take 300 mg by mouth 3 times daily as needed (leg pain) for up to 90 days.

## 2019-06-11 NOTE — TELEPHONE ENCOUNTER
From: Pastor Riley  To: Loulou Jonas MD  Sent: 6/11/2019 11:30 AM CDT  Subject: Prescription Question    Dr. Juan Leal put me on Lyrica and the payment for this med. is too high. My insurance will pay but it is still in the hundreds of dollars. I have written an application to the makers of this medicine to see if I can get a reduction in price. I need to know if there is something else that does the same thing as Lyrica that is cheaper. Thank you  Sorry to be such a pain.

## 2019-06-11 NOTE — PROGRESS NOTES
HOME MONITORING REPORT    INR today:   Results for orders placed or performed in visit on 06/11/19   Protime-INR   Result Value Ref Range    INR 1.59        INR Goal: 2.0-3.0    Dosing Plan  As of 6/11/2019    TTR:   11.4 % (1.2 y)   Full warfarin instructions:   7.5 mg every day              PLAN:PATIENT TO TAKE 15 MG TONIGHT AND THEN TO  CONTINUE CURRENT DOSE AND RECHECK IN ONE WEEK.

## 2019-06-11 NOTE — TELEPHONE ENCOUNTER
Script for Lyrica was written for tid, but only had a qty of 60. I gave the okay to fill this for 90 tablets. Requested Prescriptions     Pending Prescriptions Disp Refills    pregabalin (LYRICA) 75 MG capsule 90 capsule 3     Sig: Take 1 capsule by mouth 3 times daily for 120 days.

## 2019-06-12 NOTE — TELEPHONE ENCOUNTER
I called and spoke with the pharmacist at [facility]. He states that this medication does not need a PA. He states patient has not yet met her out of pocket deductible and until she does, she will have to pay $483.00 a month for the lyrica.

## 2019-06-12 NOTE — TELEPHONE ENCOUNTER
From: Elissa Jones  To: Jamaal Bernard DO  Sent: 6/12/2018 5:27 AM CDT  Subject: Left ear - possiblly hurt    Mowing the lawn last night a branch from a shrub entered my left ear. the ear doesn't feel quite right and has some ringing. Wondering if I can see someone to see if there's ear damage ?  Thanks  Elissa  I will be available by my cell: [phone]   The prescription should be written for 400 mg po TID, not 300

## 2019-06-13 DIAGNOSIS — G47.34 NOCTURNAL HYPOXIA: ICD-10-CM

## 2019-06-13 RX ORDER — DULOXETIN HYDROCHLORIDE 60 MG/1
60 CAPSULE, DELAYED RELEASE ORAL DAILY
Qty: 90 CAPSULE | Refills: 3 | Status: SHIPPED | OUTPATIENT
Start: 2019-06-13 | End: 2020-01-13

## 2019-06-18 ENCOUNTER — TELEPHONE (OUTPATIENT)
Dept: INTERNAL MEDICINE | Age: 69
End: 2019-06-18

## 2019-06-21 ENCOUNTER — ANTI-COAG VISIT (OUTPATIENT)
Dept: INTERNAL MEDICINE | Age: 69
End: 2019-06-21
Payer: MEDICARE

## 2019-06-21 DIAGNOSIS — D68.59 HYPERCOAGULABLE STATE, PRIMARY (HCC): ICD-10-CM

## 2019-06-21 LAB — INR BLD: 1

## 2019-06-21 PROCEDURE — 93793 ANTICOAG MGMT PT WARFARIN: CPT | Performed by: INTERNAL MEDICINE

## 2019-06-21 NOTE — PROGRESS NOTES
HOME MONITORING REPORT    INR today:   Results for orders placed or performed in visit on 06/21/19   Protime-INR   Result Value Ref Range    INR 1.00        INR Goal: 2.0-3.0    Dosing Plan  As of 6/21/2019    TTR:   11.1 % (1.2 y)   Full warfarin instructions:   6/21: 15 mg; Otherwise 7.5 mg every day              PLAN:PATIENT NOTIFIED TO  Take 15mg tonight and then resume 7.5mg daily.

## 2019-07-02 ENCOUNTER — HOSPITAL ENCOUNTER (OUTPATIENT)
Dept: NON INVASIVE DIAGNOSTICS | Age: 69
Discharge: HOME | End: 2019-07-02
Payer: MEDICARE

## 2019-07-02 ENCOUNTER — OFFICE VISIT (OUTPATIENT)
Dept: INTERNAL MEDICINE | Age: 69
End: 2019-07-02
Payer: MEDICARE

## 2019-07-02 VITALS
OXYGEN SATURATION: 98 % | WEIGHT: 244 LBS | BODY MASS INDEX: 38.3 KG/M2 | HEART RATE: 74 BPM | DIASTOLIC BLOOD PRESSURE: 90 MMHG | HEIGHT: 67 IN | SYSTOLIC BLOOD PRESSURE: 160 MMHG

## 2019-07-02 DIAGNOSIS — R53.83 OTHER FATIGUE: ICD-10-CM

## 2019-07-02 DIAGNOSIS — I10 ESSENTIAL HYPERTENSION: Primary | ICD-10-CM

## 2019-07-02 DIAGNOSIS — R60.9 EDEMA, UNSPECIFIED TYPE: ICD-10-CM

## 2019-07-02 DIAGNOSIS — D68.59 HYPERCOAGULABLE STATE (HCC): ICD-10-CM

## 2019-07-02 DIAGNOSIS — D68.69 SECONDARY HYPERCOAGULABILITY DISORDER (HCC): ICD-10-CM

## 2019-07-02 LAB
ALBUMIN SERPL-MCNC: 4.3 G/DL (ref 3.5–5.2)
ALP BLD-CCNC: 108 U/L (ref 35–104)
ALT SERPL-CCNC: 12 U/L (ref 5–33)
ANION GAP SERPL CALCULATED.3IONS-SCNC: 15 MMOL/L (ref 7–19)
AST SERPL-CCNC: 22 U/L (ref 5–32)
BILIRUB SERPL-MCNC: 0.3 MG/DL (ref 0.2–1.2)
BUN BLDV-MCNC: 17 MG/DL (ref 8–23)
CALCIUM SERPL-MCNC: 8.9 MG/DL (ref 8.8–10.2)
CHLORIDE BLD-SCNC: 103 MMOL/L (ref 98–111)
CO2: 23 MMOL/L (ref 22–29)
CREAT SERPL-MCNC: 0.9 MG/DL (ref 0.5–0.9)
GFR NON-AFRICAN AMERICAN: >60
GLUCOSE BLD-MCNC: 83 MG/DL (ref 74–109)
INR BLD: 1.61 (ref 0.88–1.18)
POTASSIUM SERPL-SCNC: 3.7 MMOL/L (ref 3.5–5)
PROTHROMBIN TIME: 18.4 SEC (ref 12–14.6)
SODIUM BLD-SCNC: 141 MMOL/L (ref 136–145)
TOTAL PROTEIN: 7.9 G/DL (ref 6.6–8.7)
TSH SERPL DL<=0.05 MIU/L-ACNC: 3.31 UIU/ML (ref 0.27–4.2)
VITAMIN B-12: 323 PG/ML (ref 211–946)

## 2019-07-02 PROCEDURE — G8400 PT W/DXA NO RESULTS DOC: HCPCS | Performed by: INTERNAL MEDICINE

## 2019-07-02 PROCEDURE — 3017F COLORECTAL CA SCREEN DOC REV: CPT | Performed by: INTERNAL MEDICINE

## 2019-07-02 PROCEDURE — 1123F ACP DISCUSS/DSCN MKR DOCD: CPT | Performed by: INTERNAL MEDICINE

## 2019-07-02 PROCEDURE — 4040F PNEUMOC VAC/ADMIN/RCVD: CPT | Performed by: INTERNAL MEDICINE

## 2019-07-02 PROCEDURE — 93970 EXTREMITY STUDY: CPT

## 2019-07-02 PROCEDURE — G8417 CALC BMI ABV UP PARAM F/U: HCPCS | Performed by: INTERNAL MEDICINE

## 2019-07-02 PROCEDURE — 1036F TOBACCO NON-USER: CPT | Performed by: INTERNAL MEDICINE

## 2019-07-02 PROCEDURE — 1090F PRES/ABSN URINE INCON ASSESS: CPT | Performed by: INTERNAL MEDICINE

## 2019-07-02 PROCEDURE — G8598 ASA/ANTIPLAT THER USED: HCPCS | Performed by: INTERNAL MEDICINE

## 2019-07-02 PROCEDURE — 99214 OFFICE O/P EST MOD 30 MIN: CPT | Performed by: INTERNAL MEDICINE

## 2019-07-02 PROCEDURE — G8427 DOCREV CUR MEDS BY ELIG CLIN: HCPCS | Performed by: INTERNAL MEDICINE

## 2019-07-02 RX ORDER — METOLAZONE 2.5 MG/1
2.5 TABLET ORAL DAILY
Qty: 30 TABLET | Refills: 3 | Status: SHIPPED | OUTPATIENT
Start: 2019-07-02 | End: 2019-09-13

## 2019-07-02 NOTE — PATIENT INSTRUCTIONS
once per day. You may need to limit salt in your diet while taking this medicine. Follow your doctor's instructions carefully. While using metolazone, you may need frequent blood tests. Your blood and urine may both be tested if you have been vomiting or are dehydrated. Metolazone can cause unusual results with certain medical tests. Tell any doctor who treats you that you are using metolazone. If you need surgery, tell the surgeon ahead of time that you are using metolazone. You may need to stop using the medicine for a short time. If you are being treated for high blood pressure, keep using this medicine even if you feel well. High blood pressure often has no symptoms. You may need to use blood pressure medicine for the rest of your life. Store the tablets at room temperature away from heat, light, and moisture. What happens if I miss a dose? Take the missed dose as soon as you remember. Skip the missed dose if it is almost time for your next scheduled dose. Do not take extra medicine to make up the missed dose. What happens if I overdose? Seek emergency medical attention or call the Poison Help line at 1-932.570.7053. Overdose symptoms may include severe dizziness or drowsiness, dry mouth, thirst, muscle weakness, feeling light-headed, or fainting. What should I avoid while taking metolazone? Drinking alcohol with this medicine can cause side effects. Avoid becoming overheated or dehydrated during exercise, in hot weather, or by not drinking enough fluids. Follow your doctor's instructions about the type and amount of liquids you should drink. In some cases, drinking too much liquid can be as unsafe as not drinking enough. What are the possible side effects of metolazone? Get emergency medical help if you have signs of an allergic reaction: hives; difficult breathing; swelling of your face, lips, tongue, or throat.   Call your doctor at once if you have:  · chest pain;  · pounding heartbeats or

## 2019-07-03 ASSESSMENT — ENCOUNTER SYMPTOMS
WHEEZING: 0
COUGH: 0
EYE ITCHING: 0
SINUS PRESSURE: 0
BLOOD IN STOOL: 0
SINUS PAIN: 0
VOMITING: 0
SORE THROAT: 0
CHEST TIGHTNESS: 0
DIARRHEA: 0
CONSTIPATION: 0
RHINORRHEA: 0
EYE DISCHARGE: 0
SHORTNESS OF BREATH: 0
BACK PAIN: 1
APNEA: 0
STRIDOR: 0
COLOR CHANGE: 0
VOICE CHANGE: 0
ABDOMINAL DISTENTION: 0
ABDOMINAL PAIN: 0
TROUBLE SWALLOWING: 0

## 2019-07-03 NOTE — PROGRESS NOTES
2/2010    biopsy neg Barretts, chronic reflux esophagitis s/p gastric bypass    UPPER GASTROINTESTINAL ENDOSCOPY  7/2006    unremarkable s/p gastric bypass    UPPER GASTROINTESTINAL ENDOSCOPY  8/10/15    Dr Preston Cantrell ENDOSCOPY  4/1/16    Dr Silva Million N/A 4/1/2016    EGD ESOPHAGOGASTRODUODENOSCOPY performed by Christa Franco MD at [address] [facility]    [address] Right 2003      Social History     Socioeconomic History    Marital status:      Spouse name: Not on file    Number of children: Not on file    Years of education: Not on file    Highest education level: Not on file   Occupational History    Not on file   Social Needs    Financial resource strain: Not on file    Food insecurity:     Worry: Not on file     Inability: Not on file    Transportation needs:     Medical: Not on file     Non-medical: Not on file   Tobacco Use    Smoking status: Never Smoker    Smokeless tobacco: Never Used   Substance and Sexual Activity    Alcohol use: No    Drug use: No    Sexual activity: Not Currently   Lifestyle    Physical activity:     Days per week: Not on file     Minutes per session: Not on file    Stress: Not on file   Relationships    Social connections:     Talks on phone: Not on file     Gets together: Not on file     Attends Latter-day service: Not on file     Active member of club or organization: Not on file     Attends meetings of clubs or organizations: Not on file     Relationship status: Not on file    Intimate partner violence:     Fear of current or ex partner: Not on file     Emotionally abused: Not on file     Physically abused: Not on file     Forced sexual activity: Not on file   Other Topics Concern    Not on file   Social History Narrative    Not on file      Family History   Problem Relation Age of Onset    Uterine Cancer Mother     Cervical Cancer Mother     Coronary Art Dis Mother     Heart Disease Father     Lung Cancer Father     Other Father         renal failure    Colon Cancer Brother     Colon Polyps Brother     Uterine Cancer Maternal Grandmother     Cervical Cancer Maternal Grandmother     Diabetes Maternal Grandmother     Cervical Cancer Sister     Other Sister         fibromyalgia    Diabetes Paternal Aunt     Esophageal Cancer Neg Hx     Liver Cancer Neg Hx     Liver Disease Neg Hx     Stomach Cancer Neg Hx     Rectal Cancer Neg Hx         Current Outpatient Medications   Medication Sig Dispense Refill    metolazone (ZAROXOLYN) 2.5 MG tablet Take 1 tablet by mouth daily 30 tablet 3    DULoxetine (CYMBALTA) 60 MG extended release capsule Take 1 capsule by mouth daily 90 capsule 3    pregabalin (LYRICA) 75 MG capsule Take 1 capsule by mouth 3 times daily for 120 days. 90 capsule 3    Diabetic Shoe MISC Diabetic shoes with inserts.  E11.9 [employer] 1 each 0    metoprolol tartrate (LOPRESSOR) 25 MG tablet Take 25 mg by mouth daily       tiotropium (SPIRIVA HANDIHALER) 18 MCG inhalation capsule Inhale 1 capsule into the lungs daily 90 capsule 1    aspirin 81 MG tablet Take 81 mg by mouth daily      cloNIDine (CATAPRES) 0.1 MG tablet Take 1 tablet by mouth 2 times daily (Patient taking differently: Take 0.1 mg by mouth 2 times daily as needed for High Blood Pressure ) 60 tablet 3    ranitidine (ZANTAC) 150 MG tablet Take 1 tablet by mouth 2 times daily 60 tablet 5    pantoprazole sodium (PROTONIX) 40 MG PACK packet Take 1 packet by mouth every morning (before breakfast) 30 each 3    warfarin (COUMADIN) 7.5 MG tablet 2 tablets daily (Patient taking differently: Take 7.5 mg by mouth daily ) 150 tablet 3    diclofenac sodium (VOLTAREN) 1 % GEL Apply 2 g topically 2 times daily 3 Tube 3    escitalopram (LEXAPRO) 10 MG tablet Take 1 tablet by mouth daily 90 tablet 3    levothyroxine (SYNTHROID) 100 MCG tablet Take 1 tablet by mouth Daily 90 tablet 3    lisinopril drip, rhinorrhea, sinus pressure, sinus pain, sneezing, sore throat, tinnitus, trouble swallowing and voice change. Eyes: Negative for discharge and itching. Watery eyes   Respiratory: Negative for apnea, cough, chest tightness, shortness of breath, wheezing and stridor. Cardiovascular: Positive for leg swelling. Negative for chest pain and palpitations. Left leg swelling; chronic secondary to DVT. Patient states that she has had more swelling of the bilateral lower extremities over the past two weeks   Gastrointestinal: Negative for abdominal distention, abdominal pain, blood in stool, constipation, diarrhea and vomiting. Endocrine: Negative for cold intolerance, heat intolerance, polydipsia, polyphagia and polyuria. Genitourinary: Negative for difficulty urinating, dysuria, flank pain, frequency, hematuria and urgency. Musculoskeletal: Positive for back pain. Negative for arthralgias, gait problem, joint swelling, myalgias, neck pain and neck stiffness. She had bilateral knee pain   Skin: Negative for color change, pallor, rash and wound. Allergic/Immunologic: Positive for environmental allergies. Negative for food allergies and immunocompromised state. Neurological: Negative for dizziness, tremors, seizures, syncope, facial asymmetry, speech difficulty, weakness, light-headedness, numbness and headaches. Hematological: Negative for adenopathy. Does not bruise/bleed easily. Psychiatric/Behavioral: Positive for dysphoric mood. Negative for agitation, confusion, decreased concentration and hallucinations. The patient is not nervous/anxious and is not hyperactive. Mood stable on Lexapro       BP (!) 160/90   Pulse 74   Ht 5' 7\" (1.702 m)   Wt 244 lb (110.7 kg)   SpO2 98%   BMI 38.22 kg/m²   Physical Exam   Constitutional: Vital signs are normal. She appears well-developed and well-nourished. No distress. HENT:   Head: Normocephalic and atraumatic.    Right Ear: External ear normal.   Left Ear: External ear normal.   Nose: Nose normal.   Mouth/Throat: No oropharyngeal exudate. Eyes: Pupils are equal, round, and reactive to light. Conjunctivae and EOM are normal. Right eye exhibits no discharge. Left eye exhibits no discharge. Neck: Normal range of motion. No JVD present. No tracheal deviation present. No thyromegaly present. Cardiovascular: Normal rate, regular rhythm and normal heart sounds. Exam reveals no gallop and no friction rub. No murmur heard. Pulmonary/Chest: Effort normal and breath sounds normal. No stridor. No respiratory distress. She has no wheezes. She has no rales. She exhibits no tenderness. Abdominal: Soft. Bowel sounds are normal. She exhibits no distension and no mass. There is no tenderness. There is no rebound and no guarding. No hernia. Musculoskeletal: Normal range of motion. She exhibits edema. She exhibits no deformity. Lumbar back: She exhibits tenderness, pain and spasm. She exhibits normal range of motion, no bony tenderness and no swelling. Back:    Chronic edema bilateral lower extremities. However, she does still has pitting edema to bilateral lower extremities. Lymphadenopathy:     She has no cervical adenopathy. Neurological: She is alert. She displays normal reflexes. No cranial nerve deficit or sensory deficit. She exhibits normal muscle tone. Coordination normal.   Skin: Skin is warm, dry and intact. Capillary refill takes less than 2 seconds. No lesion and no rash noted. She is not diaphoretic. No erythema. Psychiatric: She has a normal mood and affect. Her behavior is normal. Judgment and thought content normal. Her mood appears not anxious. She does not exhibit a depressed mood. Nursing note and vitals reviewed. 1. Other fatigue    2. Edema, unspecified type    3. Secondary hypercoagulability disorder ([facility] [address]    4.  Hypercoagulable state [facility])         ASSESSMENT/PLAN:    78year-old woman here

## 2019-07-09 ENCOUNTER — OFFICE VISIT (OUTPATIENT)
Dept: INTERNAL MEDICINE | Age: 69
End: 2019-07-09
Payer: MEDICARE

## 2019-07-09 ENCOUNTER — TELEPHONE (OUTPATIENT)
Dept: CARDIOLOGY | Facility: CLINIC | Age: 69
End: 2019-07-09

## 2019-07-09 VITALS
DIASTOLIC BLOOD PRESSURE: 80 MMHG | SYSTOLIC BLOOD PRESSURE: 140 MMHG | HEIGHT: 67 IN | WEIGHT: 233 LBS | RESPIRATION RATE: 22 BRPM | BODY MASS INDEX: 36.57 KG/M2 | HEART RATE: 78 BPM | OXYGEN SATURATION: 96 %

## 2019-07-09 DIAGNOSIS — I10 ESSENTIAL HYPERTENSION: ICD-10-CM

## 2019-07-09 DIAGNOSIS — R60.9 EDEMA, UNSPECIFIED TYPE: Primary | ICD-10-CM

## 2019-07-09 DIAGNOSIS — R60.9 EDEMA, UNSPECIFIED TYPE: ICD-10-CM

## 2019-07-09 LAB
ANION GAP SERPL CALCULATED.3IONS-SCNC: 13 MMOL/L (ref 7–19)
BUN BLDV-MCNC: 28 MG/DL (ref 8–23)
CALCIUM SERPL-MCNC: 9.4 MG/DL (ref 8.8–10.2)
CHLORIDE BLD-SCNC: 99 MMOL/L (ref 98–111)
CO2: 29 MMOL/L (ref 22–29)
CREAT SERPL-MCNC: 1.3 MG/DL (ref 0.5–0.9)
GFR NON-AFRICAN AMERICAN: 40
GLUCOSE BLD-MCNC: 108 MG/DL (ref 74–109)
POTASSIUM SERPL-SCNC: 3.9 MMOL/L (ref 3.5–5)
SODIUM BLD-SCNC: 141 MMOL/L (ref 136–145)

## 2019-07-09 PROCEDURE — 99213 OFFICE O/P EST LOW 20 MIN: CPT | Performed by: INTERNAL MEDICINE

## 2019-07-09 PROCEDURE — 1123F ACP DISCUSS/DSCN MKR DOCD: CPT | Performed by: INTERNAL MEDICINE

## 2019-07-09 PROCEDURE — 4040F PNEUMOC VAC/ADMIN/RCVD: CPT | Performed by: INTERNAL MEDICINE

## 2019-07-09 PROCEDURE — 1036F TOBACCO NON-USER: CPT | Performed by: INTERNAL MEDICINE

## 2019-07-09 PROCEDURE — 1090F PRES/ABSN URINE INCON ASSESS: CPT | Performed by: INTERNAL MEDICINE

## 2019-07-09 PROCEDURE — 3017F COLORECTAL CA SCREEN DOC REV: CPT | Performed by: INTERNAL MEDICINE

## 2019-07-09 PROCEDURE — G8598 ASA/ANTIPLAT THER USED: HCPCS | Performed by: INTERNAL MEDICINE

## 2019-07-09 PROCEDURE — G8427 DOCREV CUR MEDS BY ELIG CLIN: HCPCS | Performed by: INTERNAL MEDICINE

## 2019-07-09 PROCEDURE — G8417 CALC BMI ABV UP PARAM F/U: HCPCS | Performed by: INTERNAL MEDICINE

## 2019-07-09 PROCEDURE — G8400 PT W/DXA NO RESULTS DOC: HCPCS | Performed by: INTERNAL MEDICINE

## 2019-07-09 RX ORDER — BUMETANIDE 2 MG/1
2 TABLET ORAL DAILY
Qty: 30 TABLET | Refills: 5
Start: 2019-07-09 | End: 2019-09-13 | Stop reason: SDUPTHER

## 2019-07-09 NOTE — TELEPHONE ENCOUNTER
Patient called notifying staff that [employer] is having to send her a new Carelink monitor.  RN instructed patient to send a remote transmission when new monitor has been received.  Understanding verbalized.

## 2019-07-09 NOTE — PATIENT INSTRUCTIONS
ibuprofen. Some of these medicines can raise blood pressure. · Learn how to check your blood pressure at home. Lifestyle changes  · Stay at a healthy weight. This is especially important if you put on weight around the waist. Losing even 10 pounds can help you lower your blood pressure. · If your doctor recommends it, get more exercise. Walking is a good choice. Bit by bit, increase the amount you walk every day. Try for at least 30 minutes on most days of the week. You also may want to swim, bike, or do other activities. · Avoid or limit alcohol. Talk to your doctor about whether you can drink any alcohol. · Try to limit how much sodium you eat to less than 2,300 milligrams (mg) a day. Your doctor may ask you to try to eat less than 1,500 mg a day. · Eat plenty of fruits (such as bananas and oranges), vegetables, legumes, whole grains, and low-fat dairy products. · Lower the amount of saturated fat in your diet. Saturated fat is found in animal products such as milk, cheese, and meat. Limiting these foods may help you lose weight and also lower your risk for heart disease. · Do not smoke. Smoking increases your risk for heart attack and stroke. If you need help quitting, talk to your doctor about stop-smoking programs and medicines. These can increase your chances of quitting for good. When should you call for help? Call 911 anytime you think you may need emergency care. This may mean having symptoms that suggest that your blood pressure is causing a serious heart or blood vessel problem. Your blood pressure may be over 180/120.   For example, call 911 if:    · You have symptoms of a heart attack. These may include:  ? Chest pain or pressure, or a strange feeling in the chest.  ? Sweating. ? Shortness of breath. ? Nausea or vomiting. ? Pain, pressure, or a strange feeling in the back, neck, jaw, or upper belly or in one or both shoulders or arms. ? Lightheadedness or sudden weakness.   ? A fast or irregular heartbeat.     · You have symptoms of a stroke. These may include:  ? Sudden numbness, tingling, weakness, or loss of movement in your face, arm, or leg, especially on only one side of your body. ? Sudden vision changes. ? Sudden trouble speaking. ? Sudden confusion or trouble understanding simple statements. ? Sudden problems with walking or balance. ? A sudden, severe headache that is different from past headaches.     · You have severe back or belly pain.    Do not wait until your blood pressure comes down on its own. Get help right away.   Call your doctor now or seek immediate care if:    · Your blood pressure is much higher than normal (such as 180/120 or higher), but you don't have symptoms.     · You think high blood pressure is causing symptoms, such as:  ? Severe headache.  ? Blurry vision.    Watch closely for changes in your health, and be sure to contact your doctor if:    · Your blood pressure measures higher than your doctor recommends at least 2 times. That means the top number is higher or the bottom number is higher, or both.     · You think you may be having side effects from your blood pressure medicine. Where can you learn more? Go to https://[employer] org and sign in to your [employer] account. Enter H121 in the [employer] box to learn more about \"High Blood Pressure: Care Instructions. \"     If you do not have an account, please click on the \"Sign Up Now\" link. Current as of: July 22, 2018  Content Version: 12.0  © 3449-6869 Healthwise, Incorporated. Care instructions adapted under license by [facility] ([facility]). If you have questions about a medical condition or this instruction, always ask your healthcare professional. [place] [address] any warranty or liability for your use of this information.

## 2019-07-10 ENCOUNTER — TELEPHONE (OUTPATIENT)
Dept: INTERNAL MEDICINE | Age: 69
End: 2019-07-10

## 2019-07-10 NOTE — TELEPHONE ENCOUNTER
Instructions called to pt and she verbalized understanding. I have updated the med list to rfelct this.

## 2019-07-11 ENCOUNTER — OFFICE VISIT (OUTPATIENT)
Dept: NEUROLOGY | Facility: CLINIC | Age: 69
End: 2019-07-11

## 2019-07-11 VITALS
BODY MASS INDEX: 37.51 KG/M2 | DIASTOLIC BLOOD PRESSURE: 100 MMHG | RESPIRATION RATE: 18 BRPM | HEIGHT: 67 IN | HEART RATE: 68 BPM | SYSTOLIC BLOOD PRESSURE: 140 MMHG | WEIGHT: 239 LBS

## 2019-07-11 DIAGNOSIS — G47.10 HYPERSOMNOLENCE: ICD-10-CM

## 2019-07-11 DIAGNOSIS — G47.33 OBSTRUCTIVE SLEEP APNEA SYNDROME: Primary | ICD-10-CM

## 2019-07-11 DIAGNOSIS — G25.81 RESTLESS LEGS SYNDROME (RLS): ICD-10-CM

## 2019-07-11 PROBLEM — G47.39 COMPLEX SLEEP APNEA SYNDROME: Status: ACTIVE | Noted: 2019-05-06

## 2019-07-11 PROBLEM — G47.31 COMPLEX SLEEP APNEA SYNDROME: Status: ACTIVE | Noted: 2019-05-06

## 2019-07-11 PROCEDURE — 99213 OFFICE O/P EST LOW 20 MIN: CPT | Performed by: NURSE PRACTITIONER

## 2019-07-11 RX ORDER — DULOXETIN HYDROCHLORIDE 60 MG/1
60 CAPSULE, DELAYED RELEASE ORAL DAILY
Status: ON HOLD | COMMUNITY
End: 2020-02-26

## 2019-07-11 NOTE — PROGRESS NOTES
Subjective     Chief Complaint   Patient presents with   • Sleep Apnea       Veronica Stewart is a 69 y.o. female presents today for follow-up of sleep apnea.    History of Present Illness  Ms. Stewart is a pleasant 69-year-old female who presents today for follow-up of sleep apnea.  She is anxious today at the beginning of office visit in regards to a loss card that she is searching for.  She has not wore her machine for several days during this monitoring period due to a sinus infection after returning from [facility] to [place]. She did take her machine with her at that time. She continues to fight with their mask. She has contacted [employer] for a new mask and has not heard back from them. She states that they were to send her more supplies last month and she has not gotten these. She has had a couple episodes of weakness with emotional events. She does not drive anymore. She denies sleep paralysis. She is having issues with her legs swelling and she is under care of PCP for this with adjustment on Bumex. She has a pacemaker and history of SSS and she is under care of Dr. Lowe for this. She follows with them and sends them monitoring reports. She is on Coumadin.overnight pulse ox on current BIPAP showed less than 1 minute at or below 88% oxygen level.  There was no need for nocturnal oxygen noted.   She continues to have fatigue impacting her ADL and work. She denies any issues with tolerating her pressure settings. She denies chest pain or bloating with use of therapy.  She was to be scheduled for repeat PSG with M SLT testing to evaluate weakness following highly emotional events but this is not been scheduled yet.  I did research that and it appears scheduling has contacted the sleep lab for dates to schedule patient.  She was notified of this in the office today.She still wants to use her machine she is just recovering at this time from sinus infection.  Follow-up sleep questionnaire reviewed in office today.  Patient does  complain of moderate problems with issues in regards to pressure from the mask leaks from the mask and ill fitting mask.  She averages approximately 4 to 5 hours of sleep per night.  Her Harrison score remains elevated at 16.    Compliance report discussed in office.  She is worn her machine greater than or equal to 4 hours 40% of the time.  Her average nightly usage is approximately 3 hours and 7 minutes.  She is currently set up BiPAP 18/8 cm H2O with a back-up rate of 12.  Her AHI on therapy is 2.9.    As you recall, She did undergo repeat split-night study in May 2019 it was set at BiPAP ST 18/8 cm H2O.  She does have a back-up rate of 12.  Central apneas were noted.  She was diagnosed with complex sleep apnea.  Patient also had periodic leg movements.  It was noted she had approximately 25 minutes at or below 89% oxygen saturation level during that study.      Current Outpatient Medications   Medication Sig Dispense Refill   • ADVAIR DISKUS 250-50 MCG/DOSE DISKUS 2 (two) times a day.     • albuterol sulfate  (90 Base) MCG/ACT inhaler Inhale 2 puffs Every 4 (Four) Hours As Needed for Wheezing.     • albuterol sulfate  (90 Base) MCG/ACT inhaler Inhale 2 puffs Daily.     • aspirin 81 MG EC tablet daily.     • bumetanide (BUMEX) 1 MG tablet Take 1 mg by mouth Daily.     • calcipotriene (DOVONEX) 0.005 % cream Apply  topically to the appropriate area as directed As Needed.     • clobetasol (TEMOVATE) 0.05 % cream Apply  topically 2 (Two) Times a Day.     • CloNIDine (CATAPRES) 0.1 MG tablet Take 0.1 mg by mouth 2 (Two) Times a Day As Needed.     • cyanocobalamin 1000 MCG/ML injection Inject 1,000 mcg into the shoulder, thigh, or buttocks Every 28 (Twenty-Eight) Days.     • diclofenac (VOLTAREN) 1 % gel gel Apply 4 g topically to the appropriate area as directed 2 (Two) Times a Day.     • DULoxetine (CYMBALTA) 60 MG capsule Take 60 mg by mouth Daily.     • ergocalciferol (ERGOCALCIFEROL) 05750 UNITS  capsule Take 1 capsule by mouth 1 (One) Time Per Week. (Patient taking differently: Take 50,000 Units by mouth 2 (Two) Times a Day.) 4 capsule 3   • escitalopram (LEXAPRO) 10 MG tablet Take 10 mg by mouth Daily.     • gabapentin (NEURONTIN) 300 MG capsule 3 (Three) Times a Day.     • HYDROmorphone (DILAUDID) 2 MG tablet Take 2 mg by mouth Every 4 (Four) Hours As Needed for Moderate Pain .     • levothyroxine (SYNTHROID, LEVOTHROID) 100 MCG tablet Take  by mouth.     • lisinopril (PRINIVIL,ZESTRIL) 20 MG tablet Take 20 mg by mouth Daily.     • metoprolol tartrate (LOPRESSOR) 25 MG tablet Take 25 mg by mouth 2 (Two) Times a Day.     • Multiple Vitamins-Minerals (CENTRUM MULTIGUMMIES) chewable tablet Chew Daily.     • pantoprazole (PROTONIX) 40 MG EC tablet 2 (two) times a day.     • potassium chloride (K-DUR,KLOR-CON) 10 MEQ CR tablet Take  by mouth.     • raNITIdine (ZANTAC) 150 MG tablet Take 150 mg by mouth Every 12 (Twelve) Hours As Needed for Heartburn.     • tiotropium (SPIRIVA HANDIHALER) 18 MCG per inhalation capsule Place 18 mcg into inhaler and inhale.     • warfarin (COUMADIN) 7.5 MG tablet Take 7.5 mg by mouth. Take 7.5mg  1 po daily       No current facility-administered medications for this visit.        Past Medical History:   Diagnosis Date   • Anxiety    • Blood clot in vein 05/2018    Hospitalized    • Redd-tachy syndrome (CMS/HCC)    • Bradycardia    • Breath shortness    • Burn     left leg   • Cardiac pacemaker     11/09 MED [facility]   • Chest pain    • Chronic fatigue    • Depression    • Diabetes mellitus (CMS/HCC)    • Dizziness    • Fatigue    • Follow-up exam    • Heart burn    • Hypercoagulable state, primary (CMS/HCC)     LUPUS ANTI-COAG   • Hyperlipidemia    • Hypertension    • Liver disease    • Shortness of breath    • Sleep apnea    • Stroke (CMS/HCC)    • Syncope    • Tachy-redd syndrome (CMS/HCC)        Past Surgical History:   Procedure Laterality Date   • APPENDECTOMY     • CATARACT  "EXTRACTION     • CHOLECYSTECTOMY     • HERNIA REPAIR     • HYSTERECTOMY     • OOPHORECTOMY     • PACEMAKER IMPLANTATION     • REPLACEMENT TOTAL KNEE Right 03/26/2018    Dr doherty    • TRAM-EN-Y PROCEDURE  2002    Dr Dahiana [facility]   • SPLENECTOMY         family history includes Alcohol abuse in her brother; Anemia in her mother and sister; Cancer in her maternal grandmother; Cervical cancer in her mother; Clotting disorder in her sister; Colon cancer in her brother; Coronary artery disease in her mother; Diabetes in her maternal grandmother; Fibromyalgia in her sister; Heart failure in her father and maternal grandfather; Hyperlipidemia in her mother; Kidney failure in her father; No Known Problems in her paternal grandfather and paternal grandmother.    Social History     Tobacco Use   • Smoking status: Never Smoker   • Smokeless tobacco: Never Used   Substance Use Topics   • Alcohol use: No   • Drug use: No       Review of Systems  Constitutional: Positive for fatigue.   HENT: Positive for sinus pressure.    Eyes: Negative.    Respiratory: Negative.  Negative for apnea and shortness of breath.    Cardiovascular: Negative.  Negative for chest pain and palpitations.   Gastrointestinal: Negative.  Positive for GERD.   Endocrine: Negative.    Genitourinary: Negative.    Musculoskeletal: Negative.    Skin: Negative.    Allergic/Immunologic: Negative.    Neurological: Positive for weakness, light-headedness and memory problem. Negative for headache.   Hematological: Negative.    Psychiatric/Behavioral: Negative.    All other systems reviewed and are negative        Objective     /100 (BP Location: Left arm, Patient Position: Sitting)   Pulse 68   Resp 18   Ht 170.2 cm (67\")   Wt 108 kg (239 lb)   Breastfeeding? No   BMI 37.43 kg/m² , Body mass index is 37.43 kg/m².    Physical Exam   Constitutional: She is oriented to person, place, and time. She appears well-developed and well-nourished.   HENT: "   Head: Normocephalic and atraumatic.   Mallampati class II anatomy   Eyes: Pupils are equal, round, and reactive to light.   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 Neck supple.   Cardiovascular: Normal rate and regular rhythm.   Pacemaker implant noted   Pulmonary/Chest: Effort normal and breath sounds normal.   Abdominal: Soft.   Musculoskeletal: Normal range of motion.   Neurological: She is alert and oriented to person, place, and time.   Skin: Skin is warm and dry.   Psychiatric: She has a normal mood and affect. Her behavior is normal.         ASSESSMENT/PLAN    Veronica was seen today for sleep apnea.    Diagnoses and all orders for this visit:    Obstructive sleep apnea syndrome    Hypersomnolence    Restless legs syndrome (RLS)          Allergies and all known medications/prescriptions have been reviewed using resources available on this encounter.    Patient's Body mass index is 37.43 kg/m². BMI is above normal parameters. Recommendations include: referral to primary care.    Return in about 2 months (around 2019).    MEDICAL DECISION MAKIN.  Compliance report discussed in office today.  Patient has worn her machine greater than or equal to 4 hours 40% of the time.  She is currently set up BiPAP 18/8 cm H2O with a back-up rate of 12.  Her AHI is well-controlled at 2.9.  I did review her sleep study and titration study in office today.  We did talk about restless leg syndrome.  Did advise her when she becomes compliance with therapy this will likely reduce symptomatic wise.  We will monitor at this time.  Should she continue to have issues with restless leg syndrome will refer back to PCP for management.  She is agreeable to this plan.  2. Counseled on multimodal approach to treatment of sleep apnea to include but not limited to diet, exercise, sleep hygiene, compliance with pap therapy. Encouraged lateral sleeping position and to avoid  sedatives or alcohol close to bedtime. Risks of untreated sleep apnea were discussed to include but not limited to HTN, heart disease, stroke, cardiac arrhythmia such as AFIB, and dementia.   3.  Encouraged routine replacement of Pap supplies and communication with DME company.  She will contact [facility] again to check the status of her mask and supplies.  4.  /100 in office today.  Patient states she feels is related to stress over losing her card.  We did talk about this.  She will monitor her blood pressure today and if this continues to be elevated will seek medical attention.  She denies routine elevation of blood pressure readings.  5.  Continue to follow with cardiology as scheduled.  She denies any current chest pain, palpitations, shortness of breath, orthopnea.          Terri Adams, CATRACHITO

## 2019-07-12 ENCOUNTER — CLINICAL SUPPORT (OUTPATIENT)
Dept: CARDIOLOGY | Facility: CLINIC | Age: 69
End: 2019-07-12

## 2019-07-12 DIAGNOSIS — Z95.0 PACEMAKER: ICD-10-CM

## 2019-07-12 PROCEDURE — 93296 REM INTERROG EVL PM/IDS: CPT | Performed by: PHYSICIAN ASSISTANT

## 2019-07-12 PROCEDURE — 93294 REM INTERROG EVL PM/LDLS PM: CPT | Performed by: PHYSICIAN ASSISTANT

## 2019-07-14 ASSESSMENT — ENCOUNTER SYMPTOMS
COUGH: 0
STRIDOR: 0
SHORTNESS OF BREATH: 0
WHEEZING: 0
SORE THROAT: 0
ABDOMINAL DISTENTION: 0
RHINORRHEA: 0
SINUS PRESSURE: 0
EYE ITCHING: 0
ABDOMINAL PAIN: 0
EYE DISCHARGE: 0
CHEST TIGHTNESS: 0
APNEA: 0
VOICE CHANGE: 0
VOMITING: 0
COLOR CHANGE: 0
BLOOD IN STOOL: 0
CONSTIPATION: 0
TROUBLE SWALLOWING: 0
BACK PAIN: 1
SINUS PAIN: 0
DIARRHEA: 0

## 2019-07-14 NOTE — PROGRESS NOTES
4/1/16    Dr Melania Delgadillo    UPPER GASTROINTESTINAL ENDOSCOPY N/A 4/1/2016    EGD ESOPHAGOGASTRODUODENOSCOPY performed by Ted Adams MD at [address] [facility]    [place] Right 2003      Social History     Socioeconomic History    Marital status:      Spouse name: None    Number of children: None    Years of education: None    Highest education level: None   Occupational History    None   Social Needs    Financial resource strain: None    Food insecurity:     Worry: None     Inability: None    Transportation needs:     Medical: None     Non-medical: None   Tobacco Use    Smoking status: Never Smoker    Smokeless tobacco: Never Used   Substance and Sexual Activity    Alcohol use: No    Drug use: No    Sexual activity: Not Currently   Lifestyle    Physical activity:     Days per week: None     Minutes per session: None    Stress: None   Relationships    Social connections:     Talks on phone: None     Gets together: None     Attends Hinduism service: None     Active member of club or organization: None     Attends meetings of clubs or organizations: None     Relationship status: None    Intimate partner violence:     Fear of current or ex partner: None     Emotionally abused: None     Physically abused: None     Forced sexual activity: None   Other Topics Concern    None   Social History Narrative    None      Family History   Problem Relation Age of Onset    Uterine Cancer Mother     Cervical Cancer Mother     Coronary Art Dis Mother     Heart Disease Father     Lung Cancer Father     Other Father         renal failure    Colon Cancer Brother     Colon Polyps Brother     Uterine Cancer Maternal Grandmother     Cervical Cancer Maternal Grandmother     Diabetes Maternal Grandmother     Cervical Cancer Sister     Other Sister         fibromyalgia    Diabetes Paternal Aunt     Esophageal Cancer Neg Hx     Liver Cancer Neg Hx     Liver Disease Neg Hx     Stomach (TEMOVATE) 0.05 % cream Apply topically 2 times daily. 3 Tube 3    Multiple Vitamin (MULTIVITAMIN PO) Take 1 tablet by mouth daily       metolazone (ZAROXOLYN) 2.5 MG tablet Take 1 tablet by mouth daily 30 tablet 3    zoster recombinant adjuvanted vaccine (SHINGRIX) 50 MCG/0.5ML SUSR injection Inject 0.5 mLs into the muscle See Admin Instructions 1 dose now and repeat in 2-6 months 1 each 0    cyanocobalamin 1000 MCG/ML injection Inject 1 mL into the muscle once for 1 dose 1 mL 0     No current facility-administered medications for this visit. Patient Active Problem List   Diagnosis    Vomiting    Burping    GERD (gastroesophageal reflux disease)    History of gastric bypass    Family history of colon cancer    Bloating    Enuresis    Nausea and vomiting    Stable angina ([address]    Encounter for current long-term use of anticoagulants    Primary osteoarthritis of right knee    Arthritis of knee    Essential hypertension    Hyperglycemia    MER (obstructive sleep apnea)    Slow transit constipation    Iron deficiency anemia    Restrictive airway disease    DVT, lower extremity, proximal, acute, unspecified laterality ([address]    H/O systemic lupus erythematosus (SLE) ([address]    Anticoagulated on Coumadin    Burn of abdomen wall, second degree, initial encounter    Postmenopausal osteoporosis        Review of Systems   Constitutional: Positive for fatigue. Negative for activity change, appetite change, chills, diaphoresis, fever and unexpected weight change. HENT: Negative for congestion, ear pain, hearing loss, nosebleeds, postnasal drip, rhinorrhea, sinus pressure, sinus pain, sneezing, sore throat, tinnitus, trouble swallowing and voice change. Eyes: Negative for discharge and itching. Watery eyes   Respiratory: Negative for apnea, cough, chest tightness, shortness of breath, wheezing and stridor. Cardiovascular: Positive for leg swelling.  Negative for chest pain and

## 2019-07-18 ENCOUNTER — TELEPHONE (OUTPATIENT)
Dept: INTERNAL MEDICINE | Age: 69
End: 2019-07-18

## 2019-07-18 VITALS
WEIGHT: 236 LBS | HEART RATE: 64 BPM | HEIGHT: 67 IN | BODY MASS INDEX: 37.04 KG/M2 | DIASTOLIC BLOOD PRESSURE: 100 MMHG | SYSTOLIC BLOOD PRESSURE: 160 MMHG

## 2019-07-18 NOTE — TELEPHONE ENCOUNTER
Fiona Oms needs correction to 6/7/19 office to reflect the conditions marked on the form for diabetic shoes. I have printed this out for review.

## 2019-07-19 LAB — INR BLD: 1.2

## 2019-07-23 ENCOUNTER — ANTI-COAG VISIT (OUTPATIENT)
Dept: INTERNAL MEDICINE | Age: 69
End: 2019-07-23

## 2019-07-27 NOTE — PROGRESS NOTES
Dual Chamber Pacemaker Evaluation Report  [facility]    July 27, 2019    Primary Cardiologist: Mynor  : Medtronic Model: EnRhythm  Implant date: 11/13/09    Reason for evaluation: routine  Indication for pacemaker: sick sinus syndrome    Measurements  Atrial sensing - P wave: 2.1 mV  Atrial threshold: n/r  Atrial lead impedance: 536 ohms  Ventricular sensing - R wave: 4.4 mV  Ventricular threshold: n/r  Ventricular lead impedance:   536 ohms     Diagnostic Data  Atrial paced: 87.2 %  Ventricular paced: 0.4 %  Other: Few episodes of svt.  Longest duration is 6 sec.  Battery status: satisfactory         Final Parameters  Mode:  AAIR+  Lower rate: 60 bpm   Upper rate: 130 bpm  AV Delay: paced- 180 ms  Sensed-150 ms  Atrial - Amplitude: 1.5 V   Pulse width: 0.4 ms   Sensitivity: 0.6 mV     Ventricular - Amplitude: 2.0 V  Pulse width: 0.4 ms  Sensitivity: 0.9 mV    Changes made: n/a  Conclusions: normal pacemaker function    Follow up: 3 months

## 2019-08-01 ENCOUNTER — TELEPHONE (OUTPATIENT)
Dept: CARDIOLOGY | Facility: CLINIC | Age: 69
End: 2019-08-01

## 2019-08-02 PROBLEM — E11.9 TYPE 2 DIABETES MELLITUS WITHOUT COMPLICATION, WITHOUT LONG-TERM CURRENT USE OF INSULIN (HCC): Status: ACTIVE | Noted: 2019-08-02

## 2019-08-06 LAB
CALCIUM SERPL-MCNC: 9 MG/DL (ref 8.8–10.2)
VITAMIN D 25-HYDROXY: 14.5 NG/ML

## 2019-08-09 ENCOUNTER — ANTI-COAG VISIT (OUTPATIENT)
Dept: INTERNAL MEDICINE | Age: 69
End: 2019-08-09
Payer: MEDICARE

## 2019-08-09 DIAGNOSIS — Z79.01 ANTICOAGULATED ON COUMADIN: ICD-10-CM

## 2019-08-09 LAB — INR BLD: 2.4

## 2019-08-09 PROCEDURE — 93793 ANTICOAG MGMT PT WARFARIN: CPT | Performed by: INTERNAL MEDICINE

## 2019-08-09 NOTE — PROGRESS NOTES
HOME MONITORING REPORT    INR today:   Results for orders placed or performed in visit on 08/09/19   Protime-INR   Result Value Ref Range    INR 2.40        INR Goal: 2.0-3.0    Dosing Plan  As of 8/9/2019    TTR:   11.4 % (1.3 y)   Full warfarin instructions:   15 mg every day              PLAN:PATIENT NOTIFIED TO  CONTINUE CURRENT DOSE AND RECHECK IN ONE WEEK.

## 2019-08-16 ENCOUNTER — HOSPITAL ENCOUNTER (OUTPATIENT)
Dept: INFUSION THERAPY | Age: 69
Discharge: HOME | End: 2019-08-16
Payer: MEDICARE

## 2019-08-16 ENCOUNTER — OFFICE VISIT (OUTPATIENT)
Dept: HEMATOLOGY | Age: 69
End: 2019-08-16
Payer: MEDICARE

## 2019-08-16 VITALS
BODY MASS INDEX: 38.47 KG/M2 | OXYGEN SATURATION: 99 % | DIASTOLIC BLOOD PRESSURE: 68 MMHG | WEIGHT: 245.1 LBS | SYSTOLIC BLOOD PRESSURE: 120 MMHG | HEIGHT: 67 IN | HEART RATE: 77 BPM

## 2019-08-16 DIAGNOSIS — Z79.01 ANTICOAGULATED ON COUMADIN: ICD-10-CM

## 2019-08-16 DIAGNOSIS — L03.116 CELLULITIS OF LEFT LOWER EXTREMITY: ICD-10-CM

## 2019-08-16 DIAGNOSIS — D50.9 IRON DEFICIENCY ANEMIA, UNSPECIFIED IRON DEFICIENCY ANEMIA TYPE: Primary | ICD-10-CM

## 2019-08-16 DIAGNOSIS — D50.9 IRON DEFICIENCY ANEMIA, UNSPECIFIED IRON DEFICIENCY ANEMIA TYPE: ICD-10-CM

## 2019-08-16 PROCEDURE — 99213 OFFICE O/P EST LOW 20 MIN: CPT | Performed by: NURSE PRACTITIONER

## 2019-08-16 PROCEDURE — 85025 COMPLETE CBC W/AUTO DIFF WBC: CPT

## 2019-08-16 PROCEDURE — 99211 OFF/OP EST MAY X REQ PHY/QHP: CPT

## 2019-08-16 RX ORDER — GABAPENTIN 300 MG/1
300 CAPSULE ORAL 3 TIMES DAILY
COMMUNITY
Start: 2016-08-12 | End: 2020-01-13

## 2019-08-16 RX ORDER — MULTIVIT-MINERALS/FOLIC ACID 150 MCG
TABLET,CHEWABLE ORAL
COMMUNITY
End: 2020-01-13

## 2019-08-16 RX ORDER — AMOXICILLIN 875 MG/1
875 TABLET, COATED ORAL 2 TIMES DAILY
Qty: 20 TABLET | Refills: 0 | Status: SHIPPED | OUTPATIENT
Start: 2019-08-16 | End: 2019-08-26

## 2019-08-16 NOTE — PROGRESS NOTES
Progress Note      Pt Name: Polo Sos: 1950  MRN: [id number]    Date of evaluation: 8/21/2019  History Obtained From:  patient, electronic medical record    CHIEF COMPLAINT:    Chief Complaint   Patient presents with    Anemia     Iron Def.  Edema     Bilateral lower extremities     HISTORY OF PRESENT ILLNESS:    Brit Badillo is a 78 y.o.  female with significant PMH recurrent DVT and deficiency anemia. Pastor Melendrez continues on long-term anticoagulation with warfarin as managed by Dr. Efraín Lundberg, she reports she is presently taking 15 mg daily and has an INR of 2.4. She is presently taking ferrous sulfate 325 mg twice a day without difficulty denying any GI complaints. Pastor Melendrez returns today in routine follow-up with complaint of increased chronic bilateral lower extremity edema with erythema with the left being more erythemic than the right. She reports they she does have some discomfort with ambulating and has minimal improvement with elevation. She is unable to keep them elevated throughout the day. She denies any chest pain or increased shortness of air. Upon physical exam warmth is noted to the erythemic areas and they are tender to touch. I am concerned about cellulitis and will provide her with a prescription of amoxicillin to be taken twice a day for 10 days. I also instructed Pastor Melendrez that if the erythema did not resolve that she needs to follow-up with Dr. Efraín Lundberg for further evaluation and treatment recommendations. 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 Efraín Lundberg?    Eladio for a total of 1000 mg completed on 7/29/2018   Ferrous sulfate 325 mg PO twice a day , initiated on 8/15/2018    Hematology history #1- Recurrent DVT and history of pulmonary emboli  Pastor Melendrez was seen in initial consultation on by mouth 2 times daily for 10 days 20 tablet 0    gabapentin (NEURONTIN) 300 MG capsule 3 (Three) Times a Day.  Multiple Vitamins-Minerals (CENTRUM MULTIGUMMIES) CHEW Take by mouth      bumetanide (BUMEX) 2 MG tablet Take 1 tablet by mouth daily (Patient taking differently: Take 2 mg by mouth 2mg Mon/Wed/Fri and 1mg all other days) 30 tablet 5    metolazone (ZAROXOLYN) 2.5 MG tablet Take 1 tablet by mouth daily 30 tablet 3    DULoxetine (CYMBALTA) 60 MG extended release capsule Take 1 capsule by mouth daily 90 capsule 3    pregabalin (LYRICA) 75 MG capsule Take 1 capsule by mouth 3 times daily for 120 days.  90 capsule 3    metoprolol tartrate (LOPRESSOR) 25 MG tablet Take 25 mg by mouth daily       tiotropium (SPIRIVA HANDIHALER) 18 MCG inhalation capsule Inhale 1 capsule into the lungs daily 90 capsule 1    zoster recombinant adjuvanted vaccine (SHINGRIX) 50 MCG/0.5ML SUSR injection Inject 0.5 mLs into the muscle See Admin Instructions 1 dose now and repeat in 2-6 months 1 each 0    aspirin 81 MG tablet Take 81 mg by mouth daily      cloNIDine (CATAPRES) 0.1 MG tablet Take 1 tablet by mouth 2 times daily (Patient taking differently: Take 0.1 mg by mouth 2 times daily as needed for High Blood Pressure ) 60 tablet 3    cyanocobalamin 1000 MCG/ML injection Inject 1 mL into the muscle once for 1 dose 1 mL 0    ranitidine (ZANTAC) 150 MG tablet Take 1 tablet by mouth 2 times daily 60 tablet 5    pantoprazole sodium (PROTONIX) 40 MG PACK packet Take 1 packet by mouth every morning (before breakfast) 30 each 3    warfarin (COUMADIN) 7.5 MG tablet 2 tablets daily (Patient taking differently: Take 7.5 mg by mouth daily ) 150 tablet 3    diclofenac sodium (VOLTAREN) 1 % GEL Apply 2 g topically 2 times daily 3 Tube 3    escitalopram (LEXAPRO) 10 MG tablet Take 1 tablet by mouth daily 90 tablet 3    levothyroxine (SYNTHROID) 100 MCG tablet Take 1 tablet by mouth Daily 90 tablet 3    lisinopril Hx     Liver Disease Neg Hx     Stomach Cancer Neg Hx     Rectal Cancer Neg Hx        Vitals:  Vitals:    08/16/19 1149   BP: 120/68   Pulse: 77   SpO2: 99%   Weight: 245 lb 1.6 oz (111.2 kg)   Height: 5' 7\" (1.702 m)        Subjective   REVIEW OF SYSTEMS:   Review of Systems   Constitutional: Positive for fatigue. Negative for chills, diaphoresis and fever. HENT: Negative. Negative for congestion, ear pain, hearing loss, nosebleeds, sore throat and tinnitus. Eyes: Negative. Negative for pain, discharge and redness. Respiratory: Negative. Negative for cough, shortness of breath and wheezing. Cardiovascular: Positive for leg swelling. Negative for chest pain and palpitations. Gastrointestinal: Negative. Negative for abdominal pain, blood in stool, constipation, diarrhea, nausea and vomiting. Endocrine: Negative for polydipsia. Genitourinary: Negative for dysuria, flank pain, frequency, hematuria and urgency. Musculoskeletal: Negative. Negative for back pain, myalgias and neck pain. Skin: Positive for color change. Negative for rash. Neurological: Negative. Negative for dizziness, tremors, seizures, weakness and headaches. Hematological: Does not bruise/bleed easily. Psychiatric/Behavioral: Negative. The patient is not nervous/anxious. Objective   PHYSICAL EXAM:  Physical Exam   Constitutional: She is oriented to person, place, and time. She appears well-developed and well-nourished. No distress. HENT:   Head: Normocephalic and atraumatic. Mouth/Throat: Uvula is midline, oropharynx is clear and moist and mucous membranes are normal. No tonsillar exudate. Eyes: Pupils are equal, round, and reactive to light. Conjunctivae, EOM and lids are normal. Right eye exhibits no discharge. Left eye exhibits no discharge. No scleral icterus. Neck: Trachea normal and normal range of motion. Neck supple. No JVD present. No tracheal deviation present.  No thyroid mass and no thyromegaly present. Cardiovascular: Normal rate, regular rhythm, normal heart sounds and intact distal pulses. Exam reveals no gallop and no friction rub. No murmur heard. Pulmonary/Chest: Effort normal and breath sounds normal. No respiratory distress. She has no wheezes. She has no rales. She exhibits no tenderness. Abdominal: Soft. Bowel sounds are normal. She exhibits no distension and no mass. There is no tenderness. There is no rebound and no guarding. No hernia. Musculoskeletal: She exhibits edema (Bilateral lower extremity). She exhibits no tenderness or deformity. Range of motion within normal limits x4 extremities   Neurological: She is alert and oriented to person, place, and time. No cranial nerve deficit. Coordination normal.   Skin: Skin is warm. No rash noted. She is not diaphoretic. There is erythema (Bilateral lower extremity with left being greater than right). No pallor. Psychiatric: She has a normal mood and affect. Her behavior is normal. Thought content normal.   Vitals reviewed. Labs:  CBC:  WBC 6.75, ANC 3.45, hemoglobin 12.0, MCV 92 and a platelet count of [phone]        ASSESSMENT/PLAN:      1. Iron deficiency anemia with concern for malabsorption, stable hemoglobin of 12. Pierce Couch continues to take ferrous sulfate 325 mg twice a day without difficulty. Chema Barnett declined having lab work completed today and requested orders for labs that she will have been obtained at Dr. Aragon Males office. Orders were provided. - CBC Auto Differential; Future  - Iron and TIBC; Future  - Folate; Future  - Ferritin; Future  - Vitamin B12; Future  - Comprehensive Metabolic Panel; Future    2. Cellulitis of left cellulitis  - amoxicillin (AMOXIL) 875 MG tablet; Take 1 tablet by mouth 2 times daily for 10 days  Dispense: 20 tablet; Refill: 0    3. Anticoagulated on Coumadin, due to history of recurrent DVTs and pulmonary emboli.   With recurrence of suspected noncompliance leading to subtherapeutic

## 2019-08-21 PROBLEM — L03.116 CELLULITIS OF LEFT LOWER EXTREMITY: Status: ACTIVE | Noted: 2019-08-21

## 2019-08-21 ASSESSMENT — ENCOUNTER SYMPTOMS
SORE THROAT: 0
BLOOD IN STOOL: 0
VOMITING: 0
SHORTNESS OF BREATH: 0
WHEEZING: 0
GASTROINTESTINAL NEGATIVE: 1
RESPIRATORY NEGATIVE: 1
EYE DISCHARGE: 0
EYE PAIN: 0
CONSTIPATION: 0
EYES NEGATIVE: 1
DIARRHEA: 0
ABDOMINAL PAIN: 0
BACK PAIN: 0
NAUSEA: 0
COUGH: 0
EYE REDNESS: 0
COLOR CHANGE: 1

## 2019-08-22 ENCOUNTER — HOSPITAL ENCOUNTER (OUTPATIENT)
Dept: SLEEP MEDICINE | Facility: HOSPITAL | Age: 69
Discharge: HOME | End: 2019-08-22
Admitting: NURSE PRACTITIONER

## 2019-08-22 DIAGNOSIS — R53.1 WEAKNESS: ICD-10-CM

## 2019-08-22 DIAGNOSIS — G47.33 OBSTRUCTIVE SLEEP APNEA SYNDROME: ICD-10-CM

## 2019-08-22 PROCEDURE — 95811 POLYSOM 6/>YRS CPAP 4/> PARM: CPT | Performed by: PSYCHIATRY & NEUROLOGY

## 2019-08-22 PROCEDURE — 95811 POLYSOM 6/>YRS CPAP 4/> PARM: CPT

## 2019-08-23 ENCOUNTER — HOSPITAL ENCOUNTER (OUTPATIENT)
Dept: SLEEP MEDICINE | Facility: HOSPITAL | Age: 69
Discharge: HOME | End: 2019-08-23
Admitting: NURSE PRACTITIONER

## 2019-08-23 ENCOUNTER — LAB (OUTPATIENT)
Dept: LAB | Facility: HOSPITAL | Age: 69
End: 2019-08-23

## 2019-08-23 DIAGNOSIS — R40.0 SOMNOLENCE: ICD-10-CM

## 2019-08-23 DIAGNOSIS — R53.1 WEAKNESS: ICD-10-CM

## 2019-08-23 DIAGNOSIS — G47.10 HYPERSOMNIA: ICD-10-CM

## 2019-08-23 DIAGNOSIS — G47.33 OBSTRUCTIVE SLEEP APNEA SYNDROME: ICD-10-CM

## 2019-08-23 LAB
AMPHET+METHAMPHET UR QL: NEGATIVE
BARBITURATES UR QL SCN: NEGATIVE
BENZODIAZ UR QL SCN: NEGATIVE
CANNABINOIDS SERPL QL: NEGATIVE
COCAINE UR QL: NEGATIVE
METHADONE UR QL SCN: NEGATIVE
OPIATES UR QL: NEGATIVE
PCP UR QL SCN: NEGATIVE

## 2019-08-23 PROCEDURE — 80307 DRUG TEST PRSMV CHEM ANLYZR: CPT | Performed by: NURSE PRACTITIONER

## 2019-08-23 PROCEDURE — 95805 MULTIPLE SLEEP LATENCY TEST: CPT | Performed by: PSYCHIATRY & NEUROLOGY

## 2019-08-23 PROCEDURE — 95805 MULTIPLE SLEEP LATENCY TEST: CPT

## 2019-08-26 DIAGNOSIS — R00.0 TACHYCARDIA: ICD-10-CM

## 2019-08-26 DIAGNOSIS — R07.9 INTERMITTENT CHEST PAIN: ICD-10-CM

## 2019-08-26 DIAGNOSIS — Z95.0 PACEMAKER: Primary | ICD-10-CM

## 2019-08-28 ENCOUNTER — TELEPHONE (OUTPATIENT)
Dept: INTERNAL MEDICINE | Age: 69
End: 2019-08-28

## 2019-08-28 ENCOUNTER — ANTI-COAG VISIT (OUTPATIENT)
Dept: INTERNAL MEDICINE | Age: 69
End: 2019-08-28
Payer: MEDICARE

## 2019-08-28 DIAGNOSIS — Z79.01 ANTICOAGULATED ON COUMADIN: ICD-10-CM

## 2019-08-28 LAB — INR BLD: 2.4

## 2019-08-28 PROCEDURE — 93793 ANTICOAG MGMT PT WARFARIN: CPT | Performed by: INTERNAL MEDICINE

## 2019-09-05 LAB
INR BLD: 2.6
INR BLD: 2.6

## 2019-09-06 ENCOUNTER — ANTI-COAG VISIT (OUTPATIENT)
Dept: INTERNAL MEDICINE | Age: 69
End: 2019-09-06

## 2019-09-06 ENCOUNTER — ANTI-COAG VISIT (OUTPATIENT)
Dept: INTERNAL MEDICINE | Age: 69
End: 2019-09-06
Payer: MEDICARE

## 2019-09-06 DIAGNOSIS — I82.4Y9 DVT, LOWER EXTREMITY, PROXIMAL, ACUTE, UNSPECIFIED LATERALITY (HCC): ICD-10-CM

## 2019-09-06 DIAGNOSIS — Z79.01 ANTICOAGULATED ON COUMADIN: ICD-10-CM

## 2019-09-06 PROCEDURE — 93793 ANTICOAG MGMT PT WARFARIN: CPT | Performed by: INTERNAL MEDICINE

## 2019-09-10 DIAGNOSIS — E55.9 VITAMIN D DEFICIENCY: ICD-10-CM

## 2019-09-10 DIAGNOSIS — E53.8 B12 DEFICIENCY: ICD-10-CM

## 2019-09-10 DIAGNOSIS — E11.9 TYPE 2 DIABETES MELLITUS WITHOUT COMPLICATION, UNSPECIFIED WHETHER LONG TERM INSULIN USE (HCC): ICD-10-CM

## 2019-09-10 LAB
ALBUMIN SERPL-MCNC: 4.2 G/DL (ref 3.5–5.2)
ALP BLD-CCNC: 110 U/L (ref 35–104)
ALT SERPL-CCNC: 14 U/L (ref 5–33)
ANION GAP SERPL CALCULATED.3IONS-SCNC: 14 MMOL/L (ref 7–19)
AST SERPL-CCNC: 23 U/L (ref 5–32)
BASOPHILS ABSOLUTE: 0 K/UL (ref 0–0.2)
BASOPHILS RELATIVE PERCENT: 0.6 % (ref 0–1)
BILIRUB SERPL-MCNC: 0.4 MG/DL (ref 0.2–1.2)
BUN BLDV-MCNC: 21 MG/DL (ref 8–23)
CALCIUM SERPL-MCNC: 9.1 MG/DL (ref 8.8–10.2)
CHLORIDE BLD-SCNC: 108 MMOL/L (ref 98–111)
CHOLESTEROL, TOTAL: 194 MG/DL (ref 160–199)
CO2: 21 MMOL/L (ref 22–29)
CREAT SERPL-MCNC: 1 MG/DL (ref 0.5–0.9)
CREATININE URINE: 77.4 MG/DL (ref 4.2–622)
EOSINOPHILS ABSOLUTE: 0.1 K/UL (ref 0–0.6)
EOSINOPHILS RELATIVE PERCENT: 1.6 % (ref 0–5)
GFR NON-AFRICAN AMERICAN: 55
GLUCOSE BLD-MCNC: 81 MG/DL (ref 74–109)
HBA1C MFR BLD: 5.8 % (ref 4–6)
HCT VFR BLD CALC: 36.5 % (ref 37–47)
HDLC SERPL-MCNC: 95 MG/DL (ref 65–121)
HEMOGLOBIN: 11 G/DL (ref 12–16)
IMMATURE GRANULOCYTES #: 0 K/UL
LDL CHOLESTEROL CALCULATED: 88 MG/DL
LYMPHOCYTES ABSOLUTE: 1.6 K/UL (ref 1.1–4.5)
LYMPHOCYTES RELATIVE PERCENT: 30.4 % (ref 20–40)
MCH RBC QN AUTO: 28.1 PG (ref 27–31)
MCHC RBC AUTO-ENTMCNC: 30.1 G/DL (ref 33–37)
MCV RBC AUTO: 93.1 FL (ref 81–99)
MICROALBUMIN UR-MCNC: 2.9 MG/DL (ref 0–19)
MICROALBUMIN/CREAT UR-RTO: 37.5 MG/G
MONOCYTES ABSOLUTE: 0.7 K/UL (ref 0–0.9)
MONOCYTES RELATIVE PERCENT: 13 % (ref 0–10)
NEUTROPHILS ABSOLUTE: 2.8 K/UL (ref 1.5–7.5)
NEUTROPHILS RELATIVE PERCENT: 54 % (ref 50–65)
PDW BLD-RTO: 16.5 % (ref 11.5–14.5)
PLATELET # BLD: 125 K/UL (ref 130–400)
PMV BLD AUTO: 13.5 FL (ref 9.4–12.3)
POTASSIUM SERPL-SCNC: 4.2 MMOL/L (ref 3.5–5)
RBC # BLD: 3.92 M/UL (ref 4.2–5.4)
SODIUM BLD-SCNC: 143 MMOL/L (ref 136–145)
TOTAL PROTEIN: 8.1 G/DL (ref 6.6–8.7)
TRIGL SERPL-MCNC: 54 MG/DL (ref 0–149)
TSH SERPL DL<=0.05 MIU/L-ACNC: 5.21 UIU/ML (ref 0.27–4.2)
VITAMIN B-12: 322 PG/ML (ref 211–946)
VITAMIN D 25-HYDROXY: 16.5 NG/ML
WBC # BLD: 5.1 K/UL (ref 4.8–10.8)

## 2019-09-13 ENCOUNTER — OFFICE VISIT (OUTPATIENT)
Dept: INTERNAL MEDICINE | Age: 69
End: 2019-09-13
Payer: MEDICARE

## 2019-09-13 VITALS
BODY MASS INDEX: 39.71 KG/M2 | DIASTOLIC BLOOD PRESSURE: 120 MMHG | SYSTOLIC BLOOD PRESSURE: 180 MMHG | HEART RATE: 63 BPM | HEIGHT: 67 IN | WEIGHT: 253 LBS | OXYGEN SATURATION: 98 %

## 2019-09-13 DIAGNOSIS — K21.9 GASTROESOPHAGEAL REFLUX DISEASE WITHOUT ESOPHAGITIS: ICD-10-CM

## 2019-09-13 DIAGNOSIS — E53.8 B12 DEFICIENCY: ICD-10-CM

## 2019-09-13 DIAGNOSIS — F32.A DEPRESSION, UNSPECIFIED DEPRESSION TYPE: ICD-10-CM

## 2019-09-13 DIAGNOSIS — R60.9 EDEMA, UNSPECIFIED TYPE: ICD-10-CM

## 2019-09-13 DIAGNOSIS — Z23 NEED FOR INFLUENZA VACCINATION: Primary | ICD-10-CM

## 2019-09-13 DIAGNOSIS — I10 ESSENTIAL HYPERTENSION: ICD-10-CM

## 2019-09-13 DIAGNOSIS — R60.9 SWELLING: ICD-10-CM

## 2019-09-13 DIAGNOSIS — E11.49 OTHER DIABETIC NEUROLOGICAL COMPLICATION ASSOCIATED WITH TYPE 2 DIABETES MELLITUS (HCC): ICD-10-CM

## 2019-09-13 DIAGNOSIS — E78.5 HYPERLIPIDEMIA, UNSPECIFIED HYPERLIPIDEMIA TYPE: ICD-10-CM

## 2019-09-13 DIAGNOSIS — Z13.31 POSITIVE DEPRESSION SCREENING: ICD-10-CM

## 2019-09-13 DIAGNOSIS — E55.9 VITAMIN D DEFICIENCY: ICD-10-CM

## 2019-09-13 DIAGNOSIS — D68.59 HYPERCOAGULABLE STATE, PRIMARY (HCC): ICD-10-CM

## 2019-09-13 PROCEDURE — 1123F ACP DISCUSS/DSCN MKR DOCD: CPT | Performed by: INTERNAL MEDICINE

## 2019-09-13 PROCEDURE — 96372 THER/PROPH/DIAG INJ SC/IM: CPT | Performed by: INTERNAL MEDICINE

## 2019-09-13 PROCEDURE — 3044F HG A1C LEVEL LT 7.0%: CPT | Performed by: INTERNAL MEDICINE

## 2019-09-13 PROCEDURE — 2022F DILAT RTA XM EVC RTNOPTHY: CPT | Performed by: INTERNAL MEDICINE

## 2019-09-13 PROCEDURE — G8400 PT W/DXA NO RESULTS DOC: HCPCS | Performed by: INTERNAL MEDICINE

## 2019-09-13 PROCEDURE — G0008 ADMIN INFLUENZA VIRUS VAC: HCPCS | Performed by: INTERNAL MEDICINE

## 2019-09-13 PROCEDURE — G8427 DOCREV CUR MEDS BY ELIG CLIN: HCPCS | Performed by: INTERNAL MEDICINE

## 2019-09-13 PROCEDURE — 99214 OFFICE O/P EST MOD 30 MIN: CPT | Performed by: INTERNAL MEDICINE

## 2019-09-13 PROCEDURE — 90653 IIV ADJUVANT VACCINE IM: CPT | Performed by: INTERNAL MEDICINE

## 2019-09-13 PROCEDURE — G8431 POS CLIN DEPRES SCRN F/U DOC: HCPCS | Performed by: INTERNAL MEDICINE

## 2019-09-13 PROCEDURE — G8598 ASA/ANTIPLAT THER USED: HCPCS | Performed by: INTERNAL MEDICINE

## 2019-09-13 PROCEDURE — 3017F COLORECTAL CA SCREEN DOC REV: CPT | Performed by: INTERNAL MEDICINE

## 2019-09-13 PROCEDURE — 4040F PNEUMOC VAC/ADMIN/RCVD: CPT | Performed by: INTERNAL MEDICINE

## 2019-09-13 PROCEDURE — 1036F TOBACCO NON-USER: CPT | Performed by: INTERNAL MEDICINE

## 2019-09-13 PROCEDURE — G8417 CALC BMI ABV UP PARAM F/U: HCPCS | Performed by: INTERNAL MEDICINE

## 2019-09-13 PROCEDURE — G0444 DEPRESSION SCREEN ANNUAL: HCPCS | Performed by: INTERNAL MEDICINE

## 2019-09-13 PROCEDURE — 1090F PRES/ABSN URINE INCON ASSESS: CPT | Performed by: INTERNAL MEDICINE

## 2019-09-13 RX ORDER — ERGOCALCIFEROL 1.25 MG/1
50000 CAPSULE ORAL
Qty: 12 CAPSULE | Refills: 1
Start: 2019-09-16 | End: 2020-07-17 | Stop reason: SDUPTHER

## 2019-09-13 RX ORDER — CYANOCOBALAMIN 1000 UG/ML
1000 INJECTION INTRAMUSCULAR; SUBCUTANEOUS ONCE
Status: COMPLETED | OUTPATIENT
Start: 2019-09-13 | End: 2019-09-13

## 2019-09-13 RX ORDER — BUMETANIDE 2 MG/1
2 TABLET ORAL DAILY
Qty: 30 TABLET | Refills: 5 | Status: SHIPPED | OUTPATIENT
Start: 2019-09-13 | End: 2020-01-13 | Stop reason: SDUPTHER

## 2019-09-13 RX ADMIN — CYANOCOBALAMIN 1000 MCG: 1000 INJECTION INTRAMUSCULAR; SUBCUTANEOUS at 09:39

## 2019-09-13 ASSESSMENT — ENCOUNTER SYMPTOMS
WHEEZING: 0
COLOR CHANGE: 0
ABDOMINAL PAIN: 0
VOICE CHANGE: 0
VOMITING: 0
SHORTNESS OF BREATH: 0
DIARRHEA: 0
TROUBLE SWALLOWING: 0
STRIDOR: 0
SINUS PRESSURE: 0
APNEA: 0
CHEST TIGHTNESS: 0
ABDOMINAL DISTENTION: 0
RHINORRHEA: 0
CONSTIPATION: 0
SORE THROAT: 0
BACK PAIN: 1
COUGH: 0
BLOOD IN STOOL: 0
EYE ITCHING: 0
EYE DISCHARGE: 0
SINUS PAIN: 0

## 2019-09-13 ASSESSMENT — PATIENT HEALTH QUESTIONNAIRE - PHQ9: SUM OF ALL RESPONSES TO PHQ QUESTIONS 1-9: 15

## 2019-09-13 ASSESSMENT — LIFESTYLE VARIABLES: HOW OFTEN DO YOU HAVE A DRINK CONTAINING ALCOHOL: 0

## 2019-09-13 NOTE — PATIENT INSTRUCTIONS
Patient Education        Learning About Diuretics for High Blood Pressure  Introduction  Diuretics help to lower blood pressure. This reduces your risk of a heart attack and stroke. It also reduces your risk of kidney disease. Diuretics cause your kidneys to remove sodium and water. They also relax the blood vessel walls. These help lower your blood pressure. Examples  · Chlorthalidone  · Hydrochlorothiazide  Possible side effects  There are some common side effects. They are:  · Too little potassium. · Feeling dizzy. · Rash. · Urinating a lot. · High blood sugar. (But this is not common.)  You may have other side effects. Check the information that comes with your medicine. What to know about taking this medicine  · You may take other medicines for blood pressure. Diuretics can help those work better. They can also prevent extra fluid in your body. · You may need to take potassium pills. Or you may have to watch how much potassium is in your food. Ask your doctor about this. · You may need blood tests to check your kidneys and your potassium level. · Take your medicines exactly as prescribed. Call your doctor if you think you are having a problem with your medicine. · Check with your doctor or pharmacist before you use any other medicines. This includes over-the-counter medicines. Make sure your doctor knows all of the medicines, vitamins, herbal products, and supplements you take. Taking some medicines together can cause problems. Where can you learn more? Go to https://[employer]i.[employer] solution. org and sign in to your [employer] account. Enter Y560 in the [facility] box to learn more about \"Learning About Diuretics for High Blood Pressure. \"     If you do not have an account, please click on the \"Sign Up Now\" link. Current as of: July 22, 2018  Content Version: 12.1  © 1197-4805 [employer]. Care instructions adapted under license by [facility] ([facility]).  If you have questions first or until your blood pressure comes down. · If you are taking blood pressure medicine, talk to your doctor before you take decongestants or anti-inflammatory medicine, such as ibuprofen. Some of these medicines can raise blood pressure. · Learn how to check your blood pressure at home. Lifestyle changes  · Stay at a healthy weight. This is especially important if you put on weight around the waist. Losing even 10 pounds can help you lower your blood pressure. · If your doctor recommends it, get more exercise. Walking is a good choice. Bit by bit, increase the amount you walk every day. Try for at least 30 minutes on most days of the week. You also may want to swim, bike, or do other activities. · Avoid or limit alcohol. Talk to your doctor about whether you can drink any alcohol. · Try to limit how much sodium you eat to less than 2,300 milligrams (mg) a day. Your doctor may ask you to try to eat less than 1,500 mg a day. · Eat plenty of fruits (such as bananas and oranges), vegetables, legumes, whole grains, and low-fat dairy products. · Lower the amount of saturated fat in your diet. Saturated fat is found in animal products such as milk, cheese, and meat. Limiting these foods may help you lose weight and also lower your risk for heart disease. · Do not smoke. Smoking increases your risk for heart attack and stroke. If you need help quitting, talk to your doctor about stop-smoking programs and medicines. These can increase your chances of quitting for good. When should you call for help? Call 911 anytime you think you may need emergency care. This may mean having symptoms that suggest that your blood pressure is causing a serious heart or blood vessel problem. Your blood pressure may be over 180/120.   For example, call 911 if:    · You have symptoms of a heart attack. These may include:  ? Chest pain or pressure, or a strange feeling in the chest.  ? Sweating. ? Shortness of breath.   ? Nausea or

## 2019-09-13 NOTE — PROGRESS NOTES
Chief Complaint   Patient presents with    Diabetes    Leg Swelling     Patient complains of leg swelling. They are achy. HPI: Manjit Hoskins is a 78 y.o. female is here for type 2 diabetes, lower extremity swelling, vitamin D deficiency, diabetic neuropathy, depression, and hypertension. Her blood pressure is quite elevated today. She states that she did not take her blood pressure medicine today. Furthermore, she got bad news about her grandson, who has been accused of murder. Her legs are swelling bilaterally. She states that her blood pressure was well controlled at Dr. Love Morales office recently. She does not think that her Bumex is working well for her. She does have an older prescription from 2 to 3 years ago of 1 mg tablets. She is taking 2 of the 1 mg tablets every day. She states that they are not helping. I did call her in a new prescription for Bumex 2 mg p.o. daily. I want to get her back in next week to reevaluate her swelling. She states that she had a sleep study done that did show some tachycardia. She is scheduled to see Dr. Sveta Rosado in the near future. She also has an upcoming follow-up with Dr. Haylie Sykes. Her blood sugars are well controlled. She cannot give me any exact readings, but her A1c is 5.8. Her CBC is stable. Her cholesterol is 194. Her vitamin D is low at 16. She states that the [facility]opedic [facility] increased her ergocalciferol to twice daily dosing. Neuropathy is stable on her medications. She thinks that her medications work well for depression. She would like a flu vaccine today. She would like a B12 vaccine. She had a Medicare wellness exam in February 2019. Her cholesterol is 194.     Past Medical History:   Diagnosis Date    Abdominal pain     Abnormal EKG     Acute sinusitis     Allergic reaction to spider bite     Anemia     Anticoagulated     Anxiety     Arrhythmia     Ataxic gait     Lyons's esophagus     Bowel obstruction (HCC)     Callus     Cardiac pacemaker     CKD (chronic kidney disease) stage 2, GFR 60-89 ml/min     Coat's syndrome     Coat's syndrome     both eyes    Depression     Diabetes mellitus type 2 in nonobese (HCC)     Diabetic nephropathy (HCC)     Disequilibrium     Dizziness     DVT (deep venous thrombosis) (HCC)     Exudative retinopathy     Fibromyalgia     Fibromyositis     Gastric ulcer     GERD (gastroesophageal reflux disease)     History of gastric bypass     Hx of lupus anticoagulant disorder     Hypertension     Hypothyroidism     Intermittent claudication (HCC)     Intestinal obstruction (HCC)     Iron deficiency     Left-sided weakness     Low vitamin D level     Lupus (HCC)     Menopause     Obesity     Osteoarthritis     Osteoporosis     Other iron deficiency anemias     Pernicious anemia     PUPP (pruritic urticarial papules and plaques of pregnancy)     Right leg numbness     Right sided sciatica     Sarcoidosis     with liver involvement    Sciatica     Secondary hyperparathyroidism (HCC)     Shingles     Shortness of breath     Sleep apnea     Stomach ulcer     Syncope     Visual loss, one eye       Past Surgical History:   Procedure Laterality Date    APPENDECTOMY      C[facility]AC [facility]  [facility]/13  [facility]    EF over 60%    CHOLECYSTECTOMY      COLONOSCOPY  2/2010    negative    COLONOSCOPY  2/22/10    Dr Paty Rodriguez    COLONOSCOPY  4/1/16    Dr LAKHWINDER Horvath-internal hemorrhoids, 5 yr recall    EYE SURGERY      Cyst on Right eye    EYE SURGERY      GASTRIC BYPASS SURGERY      GASTRIC BYPASS SURGERY      HERNIA REPAIR      HYSTERECTOMY      Complete    HYSTERECTOMY      Partial - because had a tubal pregnancy.      INCONTINENCE SURGERY      Bladder Sling    OTHER SURGICAL HISTORY      IVC filter    PACEMAKER INSERTION      PACEMAKER PLACEMENT      medtronic    TX TOTAL KNEE ARTHROPLASTY Right 3/26/2018    TOTAL KNEE REPLACEMENT COMPLEX PRIMARY performed osteoporosis    Type 2 diabetes mellitus without complication, without long-term current use of insulin (HCC)    Cellulitis of left lower extremity        Review of Systems   Constitutional: Positive for fatigue. Negative for activity change, appetite change, chills, diaphoresis, fever and unexpected weight change. HENT: Negative for congestion, ear pain, hearing loss, nosebleeds, postnasal drip, rhinorrhea, sinus pressure, sinus pain, sneezing, sore throat, tinnitus, trouble swallowing and voice change. Eyes: Negative for discharge and itching. Watery eyes   Respiratory: Negative for apnea, cough, chest tightness, shortness of breath, wheezing and stridor. Cardiovascular: Positive for leg swelling. Negative for chest pain and palpitations. Left leg swelling; chronic secondary to DVT. Swelling to her bilateral lower extremities   Gastrointestinal: Negative for abdominal distention, abdominal pain, blood in stool, constipation, diarrhea and vomiting. Endocrine: Negative for cold intolerance, heat intolerance, polydipsia, polyphagia and polyuria. Genitourinary: Negative for difficulty urinating, dysuria, flank pain, frequency, hematuria and urgency. Musculoskeletal: Positive for back pain. Negative for arthralgias, gait problem, joint swelling, myalgias, neck pain and neck stiffness. She had bilateral knee pain   Skin: Negative for color change, pallor, rash and wound. Allergic/Immunologic: Positive for environmental allergies. Negative for food allergies and immunocompromised state. Neurological: Negative for dizziness, tremors, seizures, syncope, facial asymmetry, speech difficulty, weakness, light-headedness, numbness and headaches. Hematological: Negative for adenopathy. Does not bruise/bleed easily. Psychiatric/Behavioral: Positive for dysphoric mood. Negative for agitation, confusion, decreased concentration and hallucinations.  The patient is not nervous/anxious and

## 2019-09-16 ENCOUNTER — PATIENT MESSAGE (OUTPATIENT)
Dept: INTERNAL MEDICINE | Age: 69
End: 2019-09-16

## 2019-09-16 ENCOUNTER — CARE COORDINATION (OUTPATIENT)
Dept: CARE COORDINATION | Age: 69
End: 2019-09-16

## 2019-09-16 ENCOUNTER — CLINICAL SUPPORT (OUTPATIENT)
Dept: CARDIOLOGY | Facility: CLINIC | Age: 69
End: 2019-09-16

## 2019-09-16 DIAGNOSIS — Z95.0 PACEMAKER: ICD-10-CM

## 2019-09-16 LAB — INR BLD: 2.5

## 2019-09-16 PROCEDURE — 93296 REM INTERROG EVL PM/IDS: CPT | Performed by: PHYSICIAN ASSISTANT

## 2019-09-16 PROCEDURE — 93294 REM INTERROG EVL PM/LDLS PM: CPT | Performed by: PHYSICIAN ASSISTANT

## 2019-09-17 ENCOUNTER — ANTI-COAG VISIT (OUTPATIENT)
Dept: INTERNAL MEDICINE | Age: 69
End: 2019-09-17

## 2019-09-17 ENCOUNTER — ANTI-COAG VISIT (OUTPATIENT)
Dept: INTERNAL MEDICINE | Age: 69
End: 2019-09-17
Payer: MEDICARE

## 2019-09-17 DIAGNOSIS — Z79.01 ANTICOAGULATED ON COUMADIN: ICD-10-CM

## 2019-09-17 LAB — INR BLD: 2.5

## 2019-09-17 PROCEDURE — 93793 ANTICOAG MGMT PT WARFARIN: CPT | Performed by: INTERNAL MEDICINE

## 2019-09-17 NOTE — PROGRESS NOTES
HOME MONITORING REPORT    INR today:   Results for orders placed or performed in visit on 09/17/19   Protime-INR   Result Value Ref Range    INR 2.50        INR Goal: 2.0-3.0    Dosing Plan  As of 9/17/2019    TTR:   18.0 % (1.4 y)   Full warfarin instructions:   15 mg every day              PLAN:PATIENT NOTIFIED TO  CONTINUE CURRENT DOSE AND RECHECK IN ONE WEEK.

## 2019-09-20 ENCOUNTER — CARE COORDINATION (OUTPATIENT)
Dept: CARE COORDINATION | Age: 69
End: 2019-09-20

## 2019-09-20 ENCOUNTER — OFFICE VISIT (OUTPATIENT)
Dept: INTERNAL MEDICINE | Age: 69
End: 2019-09-20
Payer: MEDICARE

## 2019-09-20 VITALS
SYSTOLIC BLOOD PRESSURE: 130 MMHG | DIASTOLIC BLOOD PRESSURE: 82 MMHG | HEIGHT: 67 IN | TEMPERATURE: 96 F | OXYGEN SATURATION: 97 % | WEIGHT: 250.2 LBS | HEART RATE: 66 BPM | BODY MASS INDEX: 39.27 KG/M2

## 2019-09-20 DIAGNOSIS — I10 ESSENTIAL HYPERTENSION: ICD-10-CM

## 2019-09-20 DIAGNOSIS — R60.9 SWELLING: ICD-10-CM

## 2019-09-20 DIAGNOSIS — R60.0 LOWER EXTREMITY EDEMA: Primary | ICD-10-CM

## 2019-09-20 PROCEDURE — G8400 PT W/DXA NO RESULTS DOC: HCPCS | Performed by: PHYSICIAN ASSISTANT

## 2019-09-20 PROCEDURE — 1036F TOBACCO NON-USER: CPT | Performed by: PHYSICIAN ASSISTANT

## 2019-09-20 PROCEDURE — 4040F PNEUMOC VAC/ADMIN/RCVD: CPT | Performed by: PHYSICIAN ASSISTANT

## 2019-09-20 PROCEDURE — G8427 DOCREV CUR MEDS BY ELIG CLIN: HCPCS | Performed by: PHYSICIAN ASSISTANT

## 2019-09-20 PROCEDURE — 3017F COLORECTAL CA SCREEN DOC REV: CPT | Performed by: PHYSICIAN ASSISTANT

## 2019-09-20 PROCEDURE — G8417 CALC BMI ABV UP PARAM F/U: HCPCS | Performed by: PHYSICIAN ASSISTANT

## 2019-09-20 PROCEDURE — 1090F PRES/ABSN URINE INCON ASSESS: CPT | Performed by: PHYSICIAN ASSISTANT

## 2019-09-20 PROCEDURE — 99213 OFFICE O/P EST LOW 20 MIN: CPT | Performed by: PHYSICIAN ASSISTANT

## 2019-09-20 PROCEDURE — 1123F ACP DISCUSS/DSCN MKR DOCD: CPT | Performed by: PHYSICIAN ASSISTANT

## 2019-09-20 PROCEDURE — G8598 ASA/ANTIPLAT THER USED: HCPCS | Performed by: PHYSICIAN ASSISTANT

## 2019-09-20 ASSESSMENT — ENCOUNTER SYMPTOMS
DIARRHEA: 0
EYE REDNESS: 0
SHORTNESS OF BREATH: 0
ABDOMINAL PAIN: 0
RHINORRHEA: 0
SINUS PRESSURE: 0
EYE PAIN: 0
WHEEZING: 0
NAUSEA: 0
COUGH: 0
CONSTIPATION: 0
COLOR CHANGE: 0
CHEST TIGHTNESS: 0
SORE THROAT: 0
PHOTOPHOBIA: 0
VOMITING: 0

## 2019-09-20 NOTE — PROGRESS NOTES
[facility] INTERNAL MEDICINE  [address] [address]  [address] [address]  Dept: [phone]  Dept Fax: [phone]  Loc: [phone]      [facility] INTERNAL MEDICINE  OFFICE NOTE      Chief Complaint   Patient presents with    Other      1 week f/u , still having the swelling and in pain        Tito Johnston is a 78y.o. year old female who is seen for evaluation of lower extremity edema and hypertension. Patient's blood pressure a lot better than it was last week when she saw Dr. Alfonzo Munoz. Patient has been taking her medication as directed. Patient denies any chest pain or shortness of breath palpitations. Patient states that at first she did not really have the money to buy the Bumex but her granddaughter send her some money so she was able to purchase the Bumex 2 mg but she is only been taking it 2 days. Patient states some of the swelling has went down but she still has quite a bit of edema. Patient states she tries to elevate her legs as much as possible. Patient states that she has compression stockings but she did not wear them because they make marks on her legs. Patient states she works at [employer] and sitting at a desk and she is not able to prop her feet up during the day. When her legs are swollen like this she has increasing pain in her lower extremities. Patient will be off this weekend so she will be able to prop up her legs and take medication as directed.     Active Ambulatory Problems     Diagnosis Date Noted    Vomiting 05/02/2014    Burping 05/02/2014    GERD (gastroesophageal reflux disease) 05/02/2014    History of gastric bypass 05/02/2014    Family history of colon cancer 05/02/2014    Bloating 05/02/2014    Enuresis 08/28/2014    Nausea and vomiting     Stable angina ([facility] [address]     Encounter for current long-term use of anticoagulants 02/22/2018    Primary osteoarthritis of right knee 03/22/2018    Arthritis of knee 03/26/2018    Essential hypertension 03/26/2018    Hyperglycemia 03/26/2018    MER (obstructive sleep apnea) 03/26/2018    Slow transit constipation 03/26/2018    Iron deficiency anemia 03/26/2018    Restrictive airway disease 03/27/2018    DVT, lower extremity, proximal, acute, unspecified laterality ([facility] [address] 07/24/2018    H/O systemic lupus erythematosus (SLE) ([facility] [address] 08/02/2018    Anticoagulated on Coumadin     Burn of abdomen wall, second degree, initial encounter 08/27/2018    Postmenopausal osteoporosis 05/16/2019    Type 2 diabetes mellitus without complication, without long-term current use of insulin ([facility] [address] 08/02/2019    Cellulitis of left lower extremity 08/21/2019     Resolved Ambulatory Problems     Diagnosis Date Noted    Colon cancer screening      Past Medical History:   Diagnosis Date    Abdominal pain     Abnormal EKG     Acute sinusitis     Allergic reaction to spider bite     Anemia     Anticoagulated     Anxiety     Arrhythmia     Ataxic gait     Lyons's esophagus     Bowel obstruction (HCC)     Callus     Cardiac pacemaker     CKD (chronic kidney disease) stage 2, GFR 60-89 ml/min     Coat's syndrome     Coat's syndrome     Depression     Diabetes mellitus type 2 in nonobese (HCC)     Diabetic nephropathy (HCC)     Disequilibrium     Dizziness     DVT (deep venous thrombosis) (HCC)     Exudative retinopathy     Fibromyalgia     Fibromyositis     Gastric ulcer     Hx of lupus anticoagulant disorder     Hypertension     Hypothyroidism     Intermittent claudication (HCC)     Intestinal obstruction (HCC)     Iron deficiency     Left-sided weakness     Low vitamin D level     Lupus (HCC)     Menopause     Obesity     Osteoarthritis     Osteoporosis     Other iron deficiency anemias     Pernicious anemia     PUPP (pruritic urticarial papules and plaques of pregnancy)     Right leg numbness     Right sided sciatica     Sarcoidosis     Sciatica     Secondary chest pain, shortness of breath, wheezing, palpitations or as needed if not improving. Return if symptoms worsen or fail to improve. All questions answered. Patient voices understanding and agrees to plans along with risks and benefits of plan. Patient is instructed to continue prior medications, diet, and exercise plans as instructed. Patient agrees to follow up as instructions, sooner if needed, or to go to ER if condition becomes emergent. Additional Instructions: As always, patient is advised to bring in medication bottles in order to correctly reconcile with our current list.      Anibal Robles PA-C    EMR Dragon/transcription disclaimer: Much of this encounter note is electronic transcription/translation of spoken language to printed text. The electronictranslation of spoken language may be erroneous, or at times, nonsensical words or phrases may be inadvertently transcribed.  Although I have reviewed the note for such errors, some may still exist.

## 2019-09-23 ENCOUNTER — OFFICE VISIT (OUTPATIENT)
Dept: CARDIOLOGY | Facility: CLINIC | Age: 69
End: 2019-09-23

## 2019-09-23 ENCOUNTER — CLINICAL SUPPORT NO REQUIREMENTS (OUTPATIENT)
Dept: CARDIOLOGY | Facility: CLINIC | Age: 69
End: 2019-09-23

## 2019-09-23 VITALS
OXYGEN SATURATION: 98 % | BODY MASS INDEX: 39.24 KG/M2 | HEART RATE: 61 BPM | SYSTOLIC BLOOD PRESSURE: 170 MMHG | WEIGHT: 250 LBS | DIASTOLIC BLOOD PRESSURE: 110 MMHG | HEIGHT: 67 IN

## 2019-09-23 DIAGNOSIS — Z95.0 PACEMAKER: Primary | ICD-10-CM

## 2019-09-23 DIAGNOSIS — E78.2 MIXED HYPERLIPIDEMIA: ICD-10-CM

## 2019-09-23 DIAGNOSIS — I10 ESSENTIAL HYPERTENSION: ICD-10-CM

## 2019-09-23 DIAGNOSIS — I49.5 SSS (SICK SINUS SYNDROME) (HCC): ICD-10-CM

## 2019-09-23 DIAGNOSIS — D68.62 LUPUS ANTICOAGULANT DISORDER (HCC): ICD-10-CM

## 2019-09-23 PROCEDURE — 93000 ELECTROCARDIOGRAM COMPLETE: CPT | Performed by: INTERNAL MEDICINE

## 2019-09-23 PROCEDURE — 99214 OFFICE O/P EST MOD 30 MIN: CPT | Performed by: INTERNAL MEDICINE

## 2019-09-23 RX ORDER — PREGABALIN 75 MG/1
75 CAPSULE ORAL 3 TIMES DAILY
COMMUNITY

## 2019-09-23 NOTE — PROGRESS NOTES
Referring Provider: Zora Jackson MD    Reason for Follow-up Visit: SSS    Subjective .   Chief Complaint:   Chief Complaint   Patient presents with   • problem visit     pt was told by Dr. Raghu PEREZ that she needed to come in to be evaluted for some tachycardia while doing a sleep study.   • Rapid Heart Rate     pt states she does not feel her heart racing but it showed up on the sleep study.   • Hypertension     pt states her BP has been running high.  she states her feet and arms hurt.     • Edema     both feet are swollen.       History of present illness:  Veronica Stewart is a 69 y.o. yo female with history of SSS, s/p pacemaker placement in the past. She recently had some tachycardia noted during a sleep study. She was started on a beta blocker. Denies any exertional CP. Says her lower extremity edema is worsening. Has a history of DVT and is on coumadin     History  Past Medical History:   Diagnosis Date   • Anxiety    • Blood clot in vein 05/2018    Hospitalized    • Arben-tachy syndrome (CMS/HCC)    • Bradycardia    • Breath shortness    • Burn     left leg   • Cardiac pacemaker     11/09 [facility]   • Chest pain    • Chronic fatigue    • Depression    • Diabetes mellitus (CMS/HCC)    • Dizziness    • Fatigue    • Follow-up exam    • Heart burn    • Hypercoagulable state, primary (CMS/HCC)     LUPUS ANTI-COAG   • Hyperlipidemia    • Hypertension    • Liver disease    • Shortness of breath    • Sleep apnea     complex.  using a c-pap machine   • Stroke (CMS/HCC)    • Syncope    • Tachy-arben syndrome (CMS/HCC)    ,   Past Surgical History:   Procedure Laterality Date   • APPENDECTOMY     • CATARACT EXTRACTION     • CHOLECYSTECTOMY     • HERNIA REPAIR     • HYSTERECTOMY     • OOPHORECTOMY     • PACEMAKER IMPLANTATION     • REPLACEMENT TOTAL KNEE Right 03/26/2018    Dr doherty    • TRAM-EN-Y PROCEDURE  2002    Dr Dahiana Colon [facility]   • SPLENECTOMY     ,   Family History   Problem Relation Age of  Onset   • Coronary artery disease Mother    • Hyperlipidemia Mother    • Anemia Mother    • Cervical cancer Mother    • Kidney failure Father    • Heart failure Father    • Fibromyalgia Sister    • Anemia Sister    • Clotting disorder Sister    • Alcohol abuse Brother    • Cancer Maternal Grandmother    • Diabetes Maternal Grandmother    • Heart failure Maternal Grandfather    • No Known Problems Paternal Grandmother    • No Known Problems Paternal Grandfather    • Colon cancer Brother    ,   Social History     Tobacco Use   • Smoking status: Never Smoker   • Smokeless tobacco: Never Used   Substance Use Topics   • Alcohol use: No   • Drug use: No   ,     Medications  Current Outpatient Medications   Medication Sig Dispense Refill   • aspirin 81 MG EC tablet daily.     • bumetanide (BUMEX) 1 MG tablet Take 1 mg by mouth Daily.     • calcipotriene (DOVONEX) 0.005 % cream Apply  topically to the appropriate area as directed As Needed.     • clobetasol (TEMOVATE) 0.05 % cream Apply  topically 2 (Two) Times a Day.     • CloNIDine (CATAPRES) 0.1 MG tablet Take 0.1 mg by mouth 2 (Two) Times a Day As Needed.     • cyanocobalamin 1000 MCG/ML injection Inject 1,000 mcg into the shoulder, thigh, or buttocks Every 28 (Twenty-Eight) Days.     • diclofenac (VOLTAREN) 1 % gel gel Apply 4 g topically to the appropriate area as directed 2 (Two) Times a Day.     • DULoxetine (CYMBALTA) 60 MG capsule Take 60 mg by mouth Daily.     • ergocalciferol (ERGOCALCIFEROL) 48766 UNITS capsule Take 1 capsule by mouth 1 (One) Time Per Week. (Patient taking differently: Take 50,000 Units by mouth 2 (Two) Times a Day.) 4 capsule 3   • escitalopram (LEXAPRO) 10 MG tablet Take 10 mg by mouth Daily.     • gabapentin (NEURONTIN) 300 MG capsule 3 (Three) Times a Day.     • levothyroxine (SYNTHROID, LEVOTHROID) 100 MCG tablet Take  by mouth.     • lisinopril (PRINIVIL,ZESTRIL) 20 MG tablet Take 20 mg by mouth Daily.     • metoprolol tartrate  "(LOPRESSOR) 25 MG tablet Take 25 mg by mouth 2 (Two) Times a Day.     • Multiple Vitamins-Minerals (CENTRUM MULTIGUMMIES) chewable tablet Chew Daily.     • pantoprazole (PROTONIX) 40 MG EC tablet 2 (two) times a day.     • potassium chloride (K-DUR,KLOR-CON) 10 MEQ CR tablet Take  by mouth.     • pregabalin (LYRICA) 75 MG capsule Take 75 mg by mouth 3 (Three) Times a Day.     • raNITIdine (ZANTAC) 150 MG tablet Take 150 mg by mouth Every 12 (Twelve) Hours As Needed for Heartburn.     • tiotropium (SPIRIVA HANDIHALER) 18 MCG per inhalation capsule Place 18 mcg into inhaler and inhale.     • warfarin (COUMADIN) 7.5 MG tablet Take 7.5 mg by mouth. Take 7.5mg  1 po daily managed by pcp Dr. Jackson       No current facility-administered medications for this visit.        Allergies:  Bee venom; Insect extract allergy skin test; Percocet [oxycodone-acetaminophen]; Prednisone; Ultram [tramadol hcl]; Hydrocodone-acetaminophen; and Other    Review of Systems  Review of Systems   HENT: Negative for nosebleeds.    Cardiovascular: Positive for leg swelling. Negative for chest pain, claudication, dyspnea on exertion, irregular heartbeat, near-syncope, orthopnea, palpitations, paroxysmal nocturnal dyspnea and syncope.   Respiratory: Negative for cough, hemoptysis and shortness of breath.    Gastrointestinal: Negative for dysphagia, hematemesis and melena.   Genitourinary: Negative for hematuria.   All other systems reviewed and are negative.      Objective     Physical Exam:  BP (!) 170/110 (BP Location: Left arm, Patient Position: Sitting, Cuff Size: Adult)   Pulse 61   Ht 170.2 cm (67\")   Wt 113 kg (250 lb)   SpO2 98%   BMI 39.16 kg/m²   Physical Exam   Constitutional: She is oriented to person, place, and time. She appears well-developed and well-nourished. No distress.   HENT:   Head: Normocephalic and atraumatic.   Eyes: No scleral icterus.   Neck: Normal range of motion.   Cardiovascular: Normal rate, regular rhythm and " normal heart sounds. Exam reveals no gallop and no friction rub.   No murmur heard.  Pulmonary/Chest: Effort normal and breath sounds normal. No respiratory distress. She has no wheezes. She has no rales.   Abdominal: Soft. Bowel sounds are normal. She exhibits no distension. There is no tenderness.   Musculoskeletal: She exhibits edema (3+).   Neurological: She is alert and oriented to person, place, and time. No cranial nerve deficit.   Skin: Skin is warm and dry. She is not diaphoretic. No erythema.   Psychiatric: She has a normal mood and affect. Her behavior is normal.       Results Review:    ECG 12 Lead  Date/Time: 9/23/2019 10:38 AM  Performed by: Ronny Lowe MD  Authorized by: Ronny Lowe MD   Comparison: compared with previous ECG   Similar to previous ECG  Rhythm: sinus rhythm and paced  Conduction: conduction normal  ST Segments: ST segments normal  Other findings: left ventricular hypertrophy with strain    Clinical impression: abnormal EKG  Comments: Atrial paced            Lab on 08/23/2019   Component Date Value Ref Range Status   • Amphetamine Screen, Urine 08/23/2019 Negative  Negative Final   • Barbiturates Screen, Urine 08/23/2019 Negative  Negative Final   • Benzodiazepine Screen, Urine 08/23/2019 Negative  Negative Final   • Cocaine Screen, Urine 08/23/2019 Negative  Negative Final   • Methadone Screen, Urine 08/23/2019 Negative  Negative Final   • Opiate Screen 08/23/2019 Negative  Negative Final   • Phencyclidine (PCP), Urine 08/23/2019 Negative  Negative Final   • THC, Screen, Urine 08/23/2019 Negative  Negative Final       Assessment/Plan   Veronica was seen today for problem visit, rapid heart rate, hypertension and edema.    Diagnoses and all orders for this visit:    Essential hypertension, running high today    Peripheral edema, no evidence of significant pulmonary hypertension. This may be a complication of the IVC filter that was placed in 2003 and remains in place    SVT, will  interrogate her pacemaker and see if we need to go up on the beta blocker    Pacemaker, see above    Mixed hyperlipidemia, Patient's statin therapy is followed by PCP.    Lupus anticoagulant, recent DVT and multiple family members with DVT, cont coumadin    Patient's Body mass index is 39.16 kg/m². BMI is above normal parameters. Recommendations include: exercise counseling.

## 2019-09-23 NOTE — CARE COORDINATION
dizziness, falls, passing out, headache, confusion/change in mental status. Patient Reported Blood Pressure No flowsheet data found. Patient Reported Blood Glucose No flowsheet data found. Barriers: lack of support and stress  Plan for overcoming my barriers: Education and support from [employer] on managing stressors and understanding importance of personal health mgmt  Confidence: 7/10  Anticipated Goal Completion Date: 12/16/19              Prior to Admission medications    Medication Sig Start Date End Date Taking? Authorizing Provider   bumetanide (BUMEX) 2 MG tablet Take 1 tablet by mouth daily 9/13/19   Junior Alves MD   vitamin D (ERGOCALCIFEROL) 77751 units CAPS capsule Take 1 capsule by mouth Twice a Week 9/16/19   Junior Alves MD   gabapentin (NEURONTIN) 300 MG capsule 3 (Three) Times a Day. 8/12/16   Historical Provider, MD   Multiple Vitamins-Minerals (CENTRUM MULTIGUMMIES) CHEW Take by mouth    Historical Provider, MD   DULoxetine (CYMBALTA) 60 MG extended release capsule Take 1 capsule by mouth daily 6/13/19   Junior Alves MD   pregabalin (LYRICA) 75 MG capsule Take 1 capsule by mouth 3 times daily for 120 days.  6/11/19 10/9/19  Junior Alves MD   metoprolol tartrate (LOPRESSOR) 25 MG tablet Take 25 mg by mouth daily  2/12/19   Historical Provider, MD   tiotropium (Babara Favre) 18 MCG inhalation capsule Inhale 1 capsule into the lungs daily 2/22/19   Junior Alves MD   zoster recombinant adjuvanted vaccine [facility]) 50 MCG/0.5ML SUSR injection Inject 0.5 mLs into the muscle See Admin Instructions 1 dose now and repeat in 2-6 months 2/22/19   Junior Alves MD   aspirin 81 MG tablet Take 81 mg by mouth daily    Historical Provider, MD   cloNIDine (CATAPRES) 0.1 MG tablet Take 1 tablet by mouth 2 times daily  Patient taking differently: Take 0.1 mg by mouth 2 times daily as needed for High Blood Pressure  1/24/19   Junior Alves MD   cyanocobalamin 1000 MCG/ML

## 2019-09-23 NOTE — PROGRESS NOTES
Dual Chamber Pacemaker Evaluation Report  IN OFFICE INTERROGATION    September 23, 2019    Primary Cardiologist: Mynor  : Medtronic Model: EnRhythm [id number]  Implant date: 11/13/2009    Reason for evaluation: provider requested  Indication for pacemaker: sick sinus syndrome    Measurements  Atrial sensing - P wave: 2.2 mV  Atrial threshold: 0.5V@ 0.4ms  Atrial lead impedance: 552 ohms  Ventricular sensing - R wave: 4.4 mV  Ventricular threshold: 0.5 V @ 0.4 ms  Ventricular lead impedance:   520 ohms     Diagnostic Data  Atrial paced: 87.9 %  Ventricular paced: 0.3 %    Episodes recorded since 7/12/2019:  Available EGMs show short SVT episodes with rates up to 188 bpm.  P-AT/AF noted on cardiac compass.  [place] <0.1%.  Patient is anticoagulated with coumadin.  Discussed with Dr. Lowe.  Per Dr. Lwoe, he will increase her beta blocker.  Patient notified.      Battery status: intensify follow up   2.83V    Final Parameters  Mode:  AAIR+  Lower rate: 60 bpm   Upper rate: 130 bpm  AV Delay: paced- 180 ms  Sensed-150 ms  Atrial - Amplitude: 1.5 V   Pulse width: 0.4 ms   Sensitivity: 0.6 mV     Ventricular - Amplitude: 2 V  Pulse width: 0.4 ms  Sensitivity: 0.9 mV    Changes made: No changes made.  Conclusions: normal pacemaker function, stable pacing and sensing thresholds and battery nearing RRT    Follow up: 6 week battery check via [email or web address]

## 2019-09-27 ENCOUNTER — OFFICE VISIT (OUTPATIENT)
Dept: NEUROLOGY | Facility: CLINIC | Age: 69
End: 2019-09-27

## 2019-09-27 VITALS
DIASTOLIC BLOOD PRESSURE: 100 MMHG | OXYGEN SATURATION: 96 % | HEIGHT: 67 IN | WEIGHT: 243 LBS | BODY MASS INDEX: 38.14 KG/M2 | HEART RATE: 62 BPM | SYSTOLIC BLOOD PRESSURE: 176 MMHG

## 2019-09-27 DIAGNOSIS — Z65.8 PSYCHOSOCIAL STRESSORS: ICD-10-CM

## 2019-09-27 DIAGNOSIS — G25.81 RESTLESS LEGS SYNDROME (RLS): ICD-10-CM

## 2019-09-27 DIAGNOSIS — G47.33 OBSTRUCTIVE SLEEP APNEA: Primary | ICD-10-CM

## 2019-09-27 LAB
ANION GAP SERPL CALCULATED.3IONS-SCNC: 15 MMOL/L (ref 7–19)
BUN BLDV-MCNC: 24 MG/DL (ref 8–23)
CALCIUM SERPL-MCNC: 9 MG/DL (ref 8.8–10.2)
CHLORIDE BLD-SCNC: 100 MMOL/L (ref 98–111)
CO2: 29 MMOL/L (ref 22–29)
CREAT SERPL-MCNC: 1.3 MG/DL (ref 0.5–0.9)
GFR NON-AFRICAN AMERICAN: 40
GLUCOSE BLD-MCNC: 138 MG/DL (ref 74–109)
POTASSIUM SERPL-SCNC: 3.8 MMOL/L (ref 3.5–5)
SODIUM BLD-SCNC: 144 MMOL/L (ref 136–145)

## 2019-09-27 PROCEDURE — 99214 OFFICE O/P EST MOD 30 MIN: CPT | Performed by: PHYSICIAN ASSISTANT

## 2019-09-27 NOTE — PROGRESS NOTES
Neurology Progress Note      Chief Complaint:      Obstructive sleep apnea  Daytime hypersomnolence  Restless leg symptoms    Subjective     Subjective:    This is a pleasant 69-year-old female who presents today for follow-up of obstructive sleep apnea.  The patient is presently on BiPAP.  Compliance report is downloaded and reviewed as detailed below.  The patient continues to have difficulties with periodic limb movements nightly, however, these seem to be somewhat reduced with increasing compliance.  She still has difficulty with waking at night secondary to use of her bumetanide.      Since last being seen, she has had numerous stressors including her mother being in the hospital with new diagnosis of atrial fibrillation, DVT, etc., brother who was hospitalized for extensive burns over his body, a grandchild who is autistic which causes her a fair amount of stress and a son who has recently been incarcerated for accused murder of his girlfriend which occurred in the patient's house.  She does report that she has episodes of tearfulness which is unexplained, however, she denies any overt depressive symptoms.  She also continues have some mild leg discomfort at night consistent with her periodic limb movement.  She does not take medication specifically for this other than she is on gabapentin 3 times daily.  She does have a history of anemia, B12 deficiency and takes B12 injections and iron.    The patient states that during the day she feels severely tired and as soon as she gets home from work (she works at [employer]), she will often fall asleep before she even has opportunity to place her mask.  She also has complaints of dry mouth and nasal congestion as reported on her sleep questionnaire today.    Compliance report discussed in office.  She has worn her machine greater than or equal to 4 hours 83% of the time.  Her average nightly usage is approximately 6 hours and 43 minutes. She is  currently set up BiPAP 20/10 cmH2O with a back-up rate of 12.  Her AHI on therapy is 6.8 (was 2.9 previously).  Overall, compliance has improved substantially.    Medications:  Current Outpatient Medications   Medication Sig Dispense Refill   • aspirin 81 MG EC tablet daily.     • bumetanide (BUMEX) 1 MG tablet Take 1 mg by mouth Daily.     • calcipotriene (DOVONEX) 0.005 % cream Apply  topically to the appropriate area as directed As Needed.     • clobetasol (TEMOVATE) 0.05 % cream Apply  topically 2 (Two) Times a Day.     • CloNIDine (CATAPRES) 0.1 MG tablet Take 0.1 mg by mouth 2 (Two) Times a Day As Needed.     • cyanocobalamin 1000 MCG/ML injection Inject 1,000 mcg into the shoulder, thigh, or buttocks Every 28 (Twenty-Eight) Days.     • diclofenac (VOLTAREN) 1 % gel gel Apply 4 g topically to the appropriate area as directed 2 (Two) Times a Day.     • DULoxetine (CYMBALTA) 60 MG capsule Take 60 mg by mouth Daily.     • ergocalciferol (ERGOCALCIFEROL) 36029 UNITS capsule Take 1 capsule by mouth 1 (One) Time Per Week. (Patient taking differently: Take 50,000 Units by mouth 2 (Two) Times a Day.) 4 capsule 3   • escitalopram (LEXAPRO) 10 MG tablet Take 10 mg by mouth Daily.     • gabapentin (NEURONTIN) 300 MG capsule 3 (Three) Times a Day.     • levothyroxine (SYNTHROID, LEVOTHROID) 100 MCG tablet Take  by mouth.     • lisinopril (PRINIVIL,ZESTRIL) 20 MG tablet Take 20 mg by mouth Daily.     • metoprolol tartrate (LOPRESSOR) 25 MG tablet Take 2 tablets by mouth 2 (Two) Times a Day. 60 tablet 11   • Multiple Vitamins-Minerals (CENTRUM MULTIGUMMIES) chewable tablet Chew Daily.     • pantoprazole (PROTONIX) 40 MG EC tablet 2 (two) times a day.     • potassium chloride (K-DUR,KLOR-CON) 10 MEQ CR tablet Take  by mouth.     • pregabalin (LYRICA) 75 MG capsule Take 75 mg by mouth 3 (Three) Times a Day.     • raNITIdine (ZANTAC) 150 MG tablet Take 150 mg by mouth Every 12 (Twelve) Hours As Needed for Heartburn.     •  tiotropium (SPIRIVA HANDIHALER) 18 MCG per inhalation capsule Place 18 mcg into inhaler and inhale.     • warfarin (COUMADIN) 7.5 MG tablet Take 7.5 mg by mouth. Take 7.5mg  1 po daily managed by pcp Dr. Jackson       No current facility-administered medications for this visit.        Review of Systems:   -A 14 point review of systems is completed and is negative except for restless leg symptoms, daytime somnolence.    Objective      Vital Signs       Physical Exam:    GENERAL:  patient is alert & oriented to person & place.  HEENT: normocephalic atraumatic, moist mucous membranes.  Sclerae non-icteric.  Extraocular movements intact bilaterally.  NECK:  No lymphadenopathy, thyromegaly or bruits.  CV: regular rate & rhythm.  Normal S1, S2.  No murmur rub or gallop.    LUNGS:  unlaboured, clear to auscultation bilaterally.  No wheeze, rales or rhonchi.  ABDOMEN: Soft, nontender, non-distended.  No hepatosplenomegaly.  No abdominal bruits.  EXTREMITIES:  no clubbing, cyanosis or edema.  SKIN: No ulceration, breakdown or rash noted.  PSYCH:  Mood and affect congruent. Interacting appropriately.  SENSATION:  Light touch and pinprick intact in upper extremities from C5-T1.  Light touch and pinprick intact in lower extremities from L2-S1.   REFLEXES:  DTRs are 2+ bilaterally at triceps, biceps, brachioradialis, patellae, and Achilles.  Mo's and palmomental are negative bilaterally.  No clonus BLEs.       Results Review:    I reviewed the patient's new clinical results and findings.    No components found for: B12  Lab Results   Component Value Date    TSH 5.210 (H) 09/10/2019       Assessment/Plan       Impression:  1.  Obstructive sleep apnea with BiPAP compliance  2.  Possible underlying depression  3.  Restless leg syndrome  4.  Daytime hypersomnolence  5.  Anemia, multifactorial  6.  Psychosocial stressors    Plan:  1.  Patient's compliance with her BiPAP is improved since previous encounter, however her AHI has  increased slightly.  She complains that she has a lot of leaking around the mask and has attempted to contact [employer] to be fitted for a new mask, however, they have not yet come to her home to help her with this.  She would like to try an alternate mask and after further discussion, it also is revealed that her original mask was fit with both upper and lower dentures in place, however, she would prefer to sleep without dentures.  This is likely the contributing factor to poor fitment of her existing mask.    2.  I encouraged her to speak with her primary care provider regarding her escitalopram.  She may benefit from increasing this at least on the short-term to 20 mg daily.  As a side note, she may also benefit from a short-term neuro stimulant during the day to help keep her more alert during the waking hours.  I would consider even low-dose Provigil just once daily in the morning to see if this helps her be more alert during the day and better prepared for sleep at night.    3.  I encouraged her to speak with her primary care provider regarding her restless leg syndrome.  Consideration could be given to adjusting her Neurontin, however, I would hesitate to do this given her somnolence during the day.  Rather, I would recommend that she continue to treat her anemia as this can contribute to her restless leg symptoms and then continue with supplementation of her B12, iron and vitamin D.  I would then also recommend her to try a nighttime glass of tonic water which contains small amounts of quinidine and may be sufficient at mitigating some of her restless leg symptoms at night.  However, at follow-up, if this does not improve with the aforementioned plan, we can discuss further available treatment options such as ropinirole or others at nighttime.    4.  I believe that her daytime somnolence is due, in part, to her psychosocial stressors which are significant.  I would encourage her to have some dedicated time  for rest/mental rest, alone time in time to meditate or pray and clear her mind daily.  Again, as stated above, consideration could be given to a short-term neuro stimulant.  I believe this is more likely to factor for her daytime hypersomnolence as she is responding favorably with the BiPAP at night.  Her AHI, although increased from a previous encounter, has improved.  Hopefully, with a better fitting mask this will also improve further.    5.  Continue to follow with primary care regarding her anemia    6.  As above.  Recommend close surveillance regarding depressive symptoms, encouragement, consideration of counseling, consider meeting with her  and other close friends that can offer her encouragement and an outlet for her other stressors.    The patient voices understanding and agreement with plan of care and will call for any concerns or questions in the interim.  I would like to see her in approximately 2-3 months for follow-up evaluation and further assistance.  We reviewed pertinent diagnostic studies and the potential risks and benefits of the prescribed regimen of treatment.    We discussed compliance of the prescribed treatment regimen and instructions on medication, therapy, physical activity, etc. and potential for improvement and impact these have on their healing/recovery and risk reduction in the future.    I spent greater than 25 minutes in direct face to face contact with the patient with greater than 50% of the time being spent in education and counseling.        Antoine Acuna PA-C  09/27/19  8:10 AM

## 2019-10-01 ENCOUNTER — CARE COORDINATION (OUTPATIENT)
Dept: CARE COORDINATION | Age: 69
End: 2019-10-01

## 2019-10-03 ENCOUNTER — OFFICE VISIT (OUTPATIENT)
Dept: INTERNAL MEDICINE | Age: 69
End: 2019-10-03
Payer: MEDICARE

## 2019-10-03 VITALS
WEIGHT: 245 LBS | TEMPERATURE: 96.7 F | OXYGEN SATURATION: 98 % | HEIGHT: 67 IN | SYSTOLIC BLOOD PRESSURE: 130 MMHG | BODY MASS INDEX: 38.45 KG/M2 | HEART RATE: 80 BPM | DIASTOLIC BLOOD PRESSURE: 90 MMHG

## 2019-10-03 DIAGNOSIS — I10 ESSENTIAL HYPERTENSION: ICD-10-CM

## 2019-10-03 DIAGNOSIS — R21 RASH AND NONSPECIFIC SKIN ERUPTION: ICD-10-CM

## 2019-10-03 DIAGNOSIS — G62.9 NEUROPATHY: Primary | ICD-10-CM

## 2019-10-03 PROCEDURE — 4040F PNEUMOC VAC/ADMIN/RCVD: CPT | Performed by: PHYSICIAN ASSISTANT

## 2019-10-03 PROCEDURE — 99213 OFFICE O/P EST LOW 20 MIN: CPT | Performed by: PHYSICIAN ASSISTANT

## 2019-10-03 PROCEDURE — G8417 CALC BMI ABV UP PARAM F/U: HCPCS | Performed by: PHYSICIAN ASSISTANT

## 2019-10-03 PROCEDURE — G8598 ASA/ANTIPLAT THER USED: HCPCS | Performed by: PHYSICIAN ASSISTANT

## 2019-10-03 PROCEDURE — 1036F TOBACCO NON-USER: CPT | Performed by: PHYSICIAN ASSISTANT

## 2019-10-03 PROCEDURE — G8400 PT W/DXA NO RESULTS DOC: HCPCS | Performed by: PHYSICIAN ASSISTANT

## 2019-10-03 PROCEDURE — G8482 FLU IMMUNIZE ORDER/ADMIN: HCPCS | Performed by: PHYSICIAN ASSISTANT

## 2019-10-03 PROCEDURE — 1090F PRES/ABSN URINE INCON ASSESS: CPT | Performed by: PHYSICIAN ASSISTANT

## 2019-10-03 PROCEDURE — 1123F ACP DISCUSS/DSCN MKR DOCD: CPT | Performed by: PHYSICIAN ASSISTANT

## 2019-10-03 PROCEDURE — G8427 DOCREV CUR MEDS BY ELIG CLIN: HCPCS | Performed by: PHYSICIAN ASSISTANT

## 2019-10-03 PROCEDURE — 3017F COLORECTAL CA SCREEN DOC REV: CPT | Performed by: PHYSICIAN ASSISTANT

## 2019-10-03 ASSESSMENT — ENCOUNTER SYMPTOMS
COLOR CHANGE: 0
SINUS PRESSURE: 0
SHORTNESS OF BREATH: 0
EYE PAIN: 0
CONSTIPATION: 0
RHINORRHEA: 0
CHEST TIGHTNESS: 0
VOMITING: 0
ABDOMINAL PAIN: 0
NAUSEA: 0
COUGH: 0
WHEEZING: 0
SORE THROAT: 0
PHOTOPHOBIA: 0
DIARRHEA: 0
EYE REDNESS: 0

## 2019-10-04 NOTE — PROGRESS NOTES
Dual Chamber Pacemaker Evaluation Report  [facility]    October 4, 2019    Primary Cardiologist: Mynor  : Medtronic Model: EnRhythm  Implant date: 11/13/09    Reason for evaluation: routine  Indication for pacemaker: sick sinus syndrome    Measurements  Atrial sensing - P wave: 2.2 mV  Atrial threshold: n/r  Atrial lead impedance: 520 ohms  Ventricular sensing - R wave: 4.4 mV  Ventricular threshold: n/r  Ventricular lead impedance:   456 ohms     Diagnostic Data  Atrial paced: 87.9 %  Ventricular paced: 0.3 %  Other: Few brief episodes of SVT noted.  Longest duration is 7 sec.  Battery status: satisfactory         Final Parameters  Mode:  AAIR+  Lower rate: 60 bpm   Upper rate: 130 bpm  AV Delay: paced- 180 ms  Sensed-150 ms  Atrial - Amplitude: 1.5 V   Pulse width: 0.4 ms   Sensitivity: 0.6 mV     Ventricular - Amplitude: 2.0 V  Pulse width: 0.4 ms  Sensitivity: 0.9 mV    Changes made: n/a  Conclusions: normal pacemaker function    Follow up: 3 months

## 2019-10-07 ENCOUNTER — CARE COORDINATION (OUTPATIENT)
Dept: CARE COORDINATION | Age: 69
End: 2019-10-07

## 2019-10-08 ENCOUNTER — CARE COORDINATION (OUTPATIENT)
Dept: CARE COORDINATION | Age: 69
End: 2019-10-08

## 2019-10-08 ENCOUNTER — TRANSCRIBE ORDERS (OUTPATIENT)
Dept: ADMINISTRATIVE | Facility: HOSPITAL | Age: 69
End: 2019-10-08

## 2019-10-08 ENCOUNTER — OFFICE VISIT (OUTPATIENT)
Dept: INTERNAL MEDICINE | Age: 69
End: 2019-10-08
Payer: MEDICARE

## 2019-10-08 ENCOUNTER — TELEPHONE (OUTPATIENT)
Dept: INTERNAL MEDICINE | Age: 69
End: 2019-10-08

## 2019-10-08 VITALS
DIASTOLIC BLOOD PRESSURE: 110 MMHG | RESPIRATION RATE: 18 BRPM | BODY MASS INDEX: 37.98 KG/M2 | SYSTOLIC BLOOD PRESSURE: 138 MMHG | HEART RATE: 69 BPM | WEIGHT: 242 LBS | OXYGEN SATURATION: 98 % | HEIGHT: 67 IN

## 2019-10-08 DIAGNOSIS — G47.33 OSA (OBSTRUCTIVE SLEEP APNEA): ICD-10-CM

## 2019-10-08 DIAGNOSIS — R07.9 CHEST PAIN, UNSPECIFIED TYPE: Primary | ICD-10-CM

## 2019-10-08 DIAGNOSIS — Z95.0 PACEMAKER: ICD-10-CM

## 2019-10-08 DIAGNOSIS — I10 ESSENTIAL HYPERTENSION: ICD-10-CM

## 2019-10-08 DIAGNOSIS — E11.9 TYPE 2 DIABETES MELLITUS WITHOUT COMPLICATION, WITHOUT LONG-TERM CURRENT USE OF INSULIN (HCC): Primary | ICD-10-CM

## 2019-10-08 PROCEDURE — G8482 FLU IMMUNIZE ORDER/ADMIN: HCPCS | Performed by: NURSE PRACTITIONER

## 2019-10-08 PROCEDURE — 1036F TOBACCO NON-USER: CPT | Performed by: NURSE PRACTITIONER

## 2019-10-08 PROCEDURE — G8427 DOCREV CUR MEDS BY ELIG CLIN: HCPCS | Performed by: NURSE PRACTITIONER

## 2019-10-08 PROCEDURE — 1123F ACP DISCUSS/DSCN MKR DOCD: CPT | Performed by: NURSE PRACTITIONER

## 2019-10-08 PROCEDURE — 1090F PRES/ABSN URINE INCON ASSESS: CPT | Performed by: NURSE PRACTITIONER

## 2019-10-08 PROCEDURE — G8417 CALC BMI ABV UP PARAM F/U: HCPCS | Performed by: NURSE PRACTITIONER

## 2019-10-08 PROCEDURE — 3017F COLORECTAL CA SCREEN DOC REV: CPT | Performed by: NURSE PRACTITIONER

## 2019-10-08 PROCEDURE — 93000 ELECTROCARDIOGRAM COMPLETE: CPT | Performed by: NURSE PRACTITIONER

## 2019-10-08 PROCEDURE — G8598 ASA/ANTIPLAT THER USED: HCPCS | Performed by: NURSE PRACTITIONER

## 2019-10-08 PROCEDURE — G8400 PT W/DXA NO RESULTS DOC: HCPCS | Performed by: NURSE PRACTITIONER

## 2019-10-08 PROCEDURE — 4040F PNEUMOC VAC/ADMIN/RCVD: CPT | Performed by: NURSE PRACTITIONER

## 2019-10-08 PROCEDURE — 99214 OFFICE O/P EST MOD 30 MIN: CPT | Performed by: NURSE PRACTITIONER

## 2019-10-08 ASSESSMENT — ENCOUNTER SYMPTOMS
ABDOMINAL PAIN: 0
ABDOMINAL DISTENTION: 0
WHEEZING: 0
COUGH: 0
SORE THROAT: 0
STRIDOR: 0
EYE ITCHING: 0
VOMITING: 0
BLOOD IN STOOL: 0
DIARRHEA: 0
EYE DISCHARGE: 0
CHOKING: 0
CONSTIPATION: 0
TROUBLE SWALLOWING: 0
SHORTNESS OF BREATH: 1
NAUSEA: 1
COLOR CHANGE: 0

## 2019-10-10 ENCOUNTER — PATIENT MESSAGE (OUTPATIENT)
Dept: INTERNAL MEDICINE | Age: 69
End: 2019-10-10

## 2019-10-10 DIAGNOSIS — I82.91 CHRONIC DEEP VEIN THROMBOSIS (DVT) OF NON-EXTREMITY VEIN: ICD-10-CM

## 2019-10-10 DIAGNOSIS — M79.605 PAIN IN BOTH LOWER EXTREMITIES: ICD-10-CM

## 2019-10-10 DIAGNOSIS — L81.9 DISCOLORATION OF SKIN OF LOWER LEG: Primary | ICD-10-CM

## 2019-10-10 DIAGNOSIS — M79.604 PAIN IN BOTH LOWER EXTREMITIES: ICD-10-CM

## 2019-10-17 ENCOUNTER — CARE COORDINATION (OUTPATIENT)
Dept: CARE COORDINATION | Age: 69
End: 2019-10-17

## 2019-10-18 ENCOUNTER — HOSPITAL ENCOUNTER (OUTPATIENT)
Dept: CARDIOLOGY | Facility: HOSPITAL | Age: 69
Discharge: HOME | End: 2019-10-18
Admitting: NURSE PRACTITIONER

## 2019-10-18 VITALS
HEART RATE: 71 BPM | BODY MASS INDEX: 38.06 KG/M2 | DIASTOLIC BLOOD PRESSURE: 110 MMHG | SYSTOLIC BLOOD PRESSURE: 188 MMHG | HEIGHT: 67 IN | WEIGHT: 242.51 LBS

## 2019-10-18 DIAGNOSIS — E11.9 TYPE 2 DIABETES MELLITUS WITHOUT COMPLICATION, WITHOUT LONG-TERM CURRENT USE OF INSULIN (HCC): ICD-10-CM

## 2019-10-18 DIAGNOSIS — R07.9 CHEST PAIN, UNSPECIFIED TYPE: ICD-10-CM

## 2019-10-18 LAB
ANION GAP SERPL CALCULATED.3IONS-SCNC: 11 MMOL/L (ref 7–19)
BUN BLDV-MCNC: 17 MG/DL (ref 8–23)
CALCIUM SERPL-MCNC: 9.2 MG/DL (ref 8.8–10.2)
CHLORIDE BLD-SCNC: 108 MMOL/L (ref 98–111)
CO2: 25 MMOL/L (ref 22–29)
CREAT SERPL-MCNC: 0.9 MG/DL (ref 0.5–0.9)
GFR NON-AFRICAN AMERICAN: >60
GLUCOSE BLD-MCNC: 86 MG/DL (ref 74–109)
POTASSIUM SERPL-SCNC: 4.2 MMOL/L (ref 3.5–5)
SODIUM BLD-SCNC: 144 MMOL/L (ref 136–145)
VITAMIN D 25-HYDROXY: 15.2 NG/ML

## 2019-10-18 RX ORDER — DOBUTAMINE HYDROCHLORIDE 100 MG/100ML
10-50 INJECTION INTRAVENOUS CONTINUOUS
Status: DISCONTINUED | OUTPATIENT
Start: 2019-10-18 | End: 2019-10-19 | Stop reason: HOSPADM

## 2019-10-18 RX ORDER — ENALAPRILAT 2.5 MG/2ML
1.25 INJECTION INTRAVENOUS EVERY 6 HOURS
Status: DISCONTINUED | OUTPATIENT
Start: 2019-10-18 | End: 2019-10-19 | Stop reason: HOSPADM

## 2019-10-18 RX ADMIN — ENALAPRILAT 1.25 MG: 1.25 INJECTION INTRAVENOUS at 10:04

## 2019-10-20 LAB — INR BLD: 1

## 2019-10-21 ENCOUNTER — ANTI-COAG VISIT (OUTPATIENT)
Dept: INTERNAL MEDICINE | Age: 69
End: 2019-10-21
Payer: MEDICARE

## 2019-10-21 DIAGNOSIS — Z79.01 ENCOUNTER FOR CURRENT LONG-TERM USE OF ANTICOAGULANTS: ICD-10-CM

## 2019-10-21 PROCEDURE — 93793 ANTICOAG MGMT PT WARFARIN: CPT | Performed by: INTERNAL MEDICINE

## 2019-10-25 ENCOUNTER — HOSPITAL ENCOUNTER (OUTPATIENT)
Dept: CARDIOLOGY | Facility: HOSPITAL | Age: 69
Discharge: HOME | End: 2019-10-25
Admitting: INTERNAL MEDICINE

## 2019-10-25 ENCOUNTER — TELEPHONE (OUTPATIENT)
Dept: INTERNAL MEDICINE | Age: 69
End: 2019-10-25

## 2019-10-25 VITALS
HEART RATE: 61 BPM | SYSTOLIC BLOOD PRESSURE: 170 MMHG | HEIGHT: 67 IN | DIASTOLIC BLOOD PRESSURE: 82 MMHG | BODY MASS INDEX: 38.06 KG/M2 | WEIGHT: 242.51 LBS

## 2019-10-25 LAB
BH CV STRESS BP STAGE 1: NORMAL
BH CV STRESS BP STAGE 2: NORMAL
BH CV STRESS BP STAGE 3: NORMAL
BH CV STRESS DOB - ATROPINE STAGE 3: 2
BH CV STRESS DOSE DOBUTAMINE STAGE 1: 10
BH CV STRESS DOSE DOBUTAMINE STAGE 2: 20
BH CV STRESS DOSE DOBUTAMINE STAGE 3: 30
BH CV STRESS DURATION MIN STAGE 1: 3
BH CV STRESS DURATION MIN STAGE 2: 3
BH CV STRESS DURATION MIN STAGE 3: 2
BH CV STRESS DURATION SEC STAGE 1: 0
BH CV STRESS DURATION SEC STAGE 2: 0
BH CV STRESS DURATION SEC STAGE 3: 54
BH CV STRESS HR STAGE 1: 60
BH CV STRESS HR STAGE 2: 90
BH CV STRESS HR STAGE 3: 103
BH CV STRESS PROTOCOL 1: NORMAL
BH CV STRESS RECOVERY BP: NORMAL MMHG
BH CV STRESS RECOVERY HR: 78 BPM
BH CV STRESS STAGE 1: 1
BH CV STRESS STAGE 2: 2
BH CV STRESS STAGE 3: 3
MAXIMAL PREDICTED HEART RATE: 150 BPM
PERCENT MAX PREDICTED HR: 68.67 %
STRESS BASELINE BP: NORMAL MMHG
STRESS BASELINE HR: 61 BPM
STRESS PERCENT HR: 81 %
STRESS POST EXERCISE DUR MIN: 8 MIN
STRESS POST EXERCISE DUR SEC: 54 SEC
STRESS POST PEAK BP: NORMAL MMHG
STRESS POST PEAK HR: 103 BPM
STRESS TARGET HR: 128 BPM

## 2019-10-25 PROCEDURE — 93350 STRESS TTE ONLY: CPT

## 2019-10-25 PROCEDURE — 93350 STRESS TTE ONLY: CPT | Performed by: INTERNAL MEDICINE

## 2019-10-25 PROCEDURE — 93352 ADMIN ECG CONTRAST AGENT: CPT | Performed by: INTERNAL MEDICINE

## 2019-10-25 PROCEDURE — 93018 CV STRESS TEST I&R ONLY: CPT | Performed by: INTERNAL MEDICINE

## 2019-10-25 PROCEDURE — A9270 NON-COVERED ITEM OR SERVICE: HCPCS | Performed by: INTERNAL MEDICINE

## 2019-10-25 PROCEDURE — 63710000001 NITROGLYCERIN 0.4 MG SUBLINGUAL TABLET 25 EACH BOTTLE: Performed by: INTERNAL MEDICINE

## 2019-10-25 PROCEDURE — 93017 CV STRESS TEST TRACING ONLY: CPT

## 2019-10-25 PROCEDURE — 25010000002 PERFLUTREN 6.52 MG/ML SUSPENSION: Performed by: INTERNAL MEDICINE

## 2019-10-25 PROCEDURE — 25010000003 DOBUTAMINE PER 250 MG: Performed by: INTERNAL MEDICINE

## 2019-10-25 RX ORDER — DOBUTAMINE HYDROCHLORIDE 100 MG/100ML
10-50 INJECTION INTRAVENOUS CONTINUOUS
Status: DISCONTINUED | OUTPATIENT
Start: 2019-10-25 | End: 2019-10-26 | Stop reason: HOSPADM

## 2019-10-25 RX ORDER — NITROGLYCERIN 0.4 MG/1
0.4 TABLET SUBLINGUAL
Status: DISCONTINUED | OUTPATIENT
Start: 2019-10-25 | End: 2019-10-26 | Stop reason: HOSPADM

## 2019-10-25 RX ADMIN — Medication 10 MCG/KG/MIN: at 08:32

## 2019-10-25 RX ADMIN — NITROGLYCERIN 0.4 MG: 0.4 TABLET SUBLINGUAL at 09:28

## 2019-10-25 RX ADMIN — PERFLUTREN 8.48 MG: 6.52 INJECTION, SUSPENSION INTRAVENOUS at 08:31

## 2019-10-25 RX ADMIN — NITROGLYCERIN 0.4 MG: 0.4 TABLET SUBLINGUAL at 09:23

## 2019-10-25 RX ADMIN — ATROPINE SULFATE 2 MG: 0.1 INJECTION PARENTERAL at 09:20

## 2019-10-30 ENCOUNTER — OFFICE VISIT (OUTPATIENT)
Dept: VASCULAR SURGERY | Age: 69
End: 2019-10-30
Payer: MEDICARE

## 2019-10-30 VITALS
OXYGEN SATURATION: 99 % | SYSTOLIC BLOOD PRESSURE: 170 MMHG | HEART RATE: 77 BPM | DIASTOLIC BLOOD PRESSURE: 88 MMHG | TEMPERATURE: 98.6 F | RESPIRATION RATE: 18 BRPM

## 2019-10-30 DIAGNOSIS — Z86.718 HISTORY OF DVT (DEEP VEIN THROMBOSIS): ICD-10-CM

## 2019-10-30 DIAGNOSIS — D68.62 LUPUS ANTICOAGULANT DISORDER (HCC): ICD-10-CM

## 2019-10-30 DIAGNOSIS — Z86.711 HISTORY OF PULMONARY EMBOLISM: ICD-10-CM

## 2019-10-30 PROCEDURE — 1123F ACP DISCUSS/DSCN MKR DOCD: CPT | Performed by: NURSE PRACTITIONER

## 2019-10-30 PROCEDURE — 3017F COLORECTAL CA SCREEN DOC REV: CPT | Performed by: NURSE PRACTITIONER

## 2019-10-30 PROCEDURE — 1090F PRES/ABSN URINE INCON ASSESS: CPT | Performed by: NURSE PRACTITIONER

## 2019-10-30 PROCEDURE — 4040F PNEUMOC VAC/ADMIN/RCVD: CPT | Performed by: NURSE PRACTITIONER

## 2019-10-30 PROCEDURE — G8400 PT W/DXA NO RESULTS DOC: HCPCS | Performed by: NURSE PRACTITIONER

## 2019-10-30 PROCEDURE — G8417 CALC BMI ABV UP PARAM F/U: HCPCS | Performed by: NURSE PRACTITIONER

## 2019-10-30 PROCEDURE — G8598 ASA/ANTIPLAT THER USED: HCPCS | Performed by: NURSE PRACTITIONER

## 2019-10-30 PROCEDURE — 99213 OFFICE O/P EST LOW 20 MIN: CPT | Performed by: NURSE PRACTITIONER

## 2019-10-30 PROCEDURE — G8482 FLU IMMUNIZE ORDER/ADMIN: HCPCS | Performed by: NURSE PRACTITIONER

## 2019-10-30 PROCEDURE — 1036F TOBACCO NON-USER: CPT | Performed by: NURSE PRACTITIONER

## 2019-10-30 PROCEDURE — G8427 DOCREV CUR MEDS BY ELIG CLIN: HCPCS | Performed by: NURSE PRACTITIONER

## 2019-11-01 ENCOUNTER — OFFICE VISIT (OUTPATIENT)
Dept: INTERNAL MEDICINE | Age: 69
End: 2019-11-01
Payer: MEDICARE

## 2019-11-01 VITALS
WEIGHT: 243 LBS | OXYGEN SATURATION: 97 % | RESPIRATION RATE: 20 BRPM | BODY MASS INDEX: 38.14 KG/M2 | HEIGHT: 67 IN | HEART RATE: 75 BPM | DIASTOLIC BLOOD PRESSURE: 120 MMHG | SYSTOLIC BLOOD PRESSURE: 160 MMHG

## 2019-11-01 DIAGNOSIS — E11.21 TYPE II DIABETES MELLITUS WITH NEPHROPATHY (HCC): ICD-10-CM

## 2019-11-01 DIAGNOSIS — E11.49 OTHER DIABETIC NEUROLOGICAL COMPLICATION ASSOCIATED WITH TYPE 2 DIABETES MELLITUS (HCC): ICD-10-CM

## 2019-11-01 DIAGNOSIS — I10 ESSENTIAL HYPERTENSION: Primary | ICD-10-CM

## 2019-11-01 DIAGNOSIS — I82.5Y2 CHRONIC DEEP VEIN THROMBOSIS (DVT) OF PROXIMAL VEIN OF LEFT LOWER EXTREMITY (HCC): ICD-10-CM

## 2019-11-01 DIAGNOSIS — G47.30 SLEEP APNEA, UNSPECIFIED TYPE: ICD-10-CM

## 2019-11-01 DIAGNOSIS — E55.9 VITAMIN D DEFICIENCY: ICD-10-CM

## 2019-11-01 DIAGNOSIS — F32.A DEPRESSION, UNSPECIFIED DEPRESSION TYPE: ICD-10-CM

## 2019-11-01 PROCEDURE — 1090F PRES/ABSN URINE INCON ASSESS: CPT | Performed by: INTERNAL MEDICINE

## 2019-11-01 PROCEDURE — G8417 CALC BMI ABV UP PARAM F/U: HCPCS | Performed by: INTERNAL MEDICINE

## 2019-11-01 PROCEDURE — 3017F COLORECTAL CA SCREEN DOC REV: CPT | Performed by: INTERNAL MEDICINE

## 2019-11-01 PROCEDURE — 1036F TOBACCO NON-USER: CPT | Performed by: INTERNAL MEDICINE

## 2019-11-01 PROCEDURE — G8598 ASA/ANTIPLAT THER USED: HCPCS | Performed by: INTERNAL MEDICINE

## 2019-11-01 PROCEDURE — G8482 FLU IMMUNIZE ORDER/ADMIN: HCPCS | Performed by: INTERNAL MEDICINE

## 2019-11-01 PROCEDURE — 3044F HG A1C LEVEL LT 7.0%: CPT | Performed by: INTERNAL MEDICINE

## 2019-11-01 PROCEDURE — G8427 DOCREV CUR MEDS BY ELIG CLIN: HCPCS | Performed by: INTERNAL MEDICINE

## 2019-11-01 PROCEDURE — 99214 OFFICE O/P EST MOD 30 MIN: CPT | Performed by: INTERNAL MEDICINE

## 2019-11-01 PROCEDURE — 2022F DILAT RTA XM EVC RTNOPTHY: CPT | Performed by: INTERNAL MEDICINE

## 2019-11-01 PROCEDURE — G8400 PT W/DXA NO RESULTS DOC: HCPCS | Performed by: INTERNAL MEDICINE

## 2019-11-01 PROCEDURE — 1123F ACP DISCUSS/DSCN MKR DOCD: CPT | Performed by: INTERNAL MEDICINE

## 2019-11-01 PROCEDURE — 4040F PNEUMOC VAC/ADMIN/RCVD: CPT | Performed by: INTERNAL MEDICINE

## 2019-11-01 RX ORDER — LISINOPRIL 40 MG/1
40 TABLET ORAL DAILY
Qty: 90 TABLET | Refills: 1 | Status: SHIPPED | OUTPATIENT
Start: 2019-11-01 | End: 2020-01-13 | Stop reason: SDUPTHER

## 2019-11-02 ASSESSMENT — ENCOUNTER SYMPTOMS
CONSTIPATION: 0
SORE THROAT: 0
SHORTNESS OF BREATH: 0
APNEA: 0
SINUS PRESSURE: 0
EYE DISCHARGE: 0
ABDOMINAL PAIN: 0
ABDOMINAL DISTENTION: 0
DIARRHEA: 0
EYE ITCHING: 0
COUGH: 0
STRIDOR: 0
WHEEZING: 0
VOICE CHANGE: 0
SINUS PAIN: 0
BLOOD IN STOOL: 0
BACK PAIN: 1
RHINORRHEA: 0
VOMITING: 0
COLOR CHANGE: 0
TROUBLE SWALLOWING: 0
CHEST TIGHTNESS: 0

## 2019-11-05 ENCOUNTER — CLINICAL SUPPORT (OUTPATIENT)
Dept: CARDIOLOGY | Facility: CLINIC | Age: 69
End: 2019-11-05

## 2019-11-05 DIAGNOSIS — Z95.0 PACEMAKER: ICD-10-CM

## 2019-11-05 PROCEDURE — 93296 REM INTERROG EVL PM/IDS: CPT | Performed by: PHYSICIAN ASSISTANT

## 2019-11-05 PROCEDURE — 93294 REM INTERROG EVL PM/LDLS PM: CPT | Performed by: PHYSICIAN ASSISTANT

## 2019-11-07 NOTE — PROGRESS NOTES
Dual Chamber Pacemaker Evaluation Report  [facility]    November 7, 2019    Primary Cardiologist: Mynor  : Medtronic Model: EnRhythm  Implant date: 11/13/09    Reason for evaluation: routine  Indication for pacemaker: sick sinus syndrome    Measurements  Atrial sensing - P wave: 1.9 mV  Atrial threshold: n/r  Atrial lead impedance: 504 ohms  Ventricular sensing - R wave: 4.4 mV  Ventricular threshold: n/r  Ventricular lead impedance:   488 ohms     Diagnostic Data  Atrial paced: 87.9 %  Ventricular paced: 0.7 %  Other: few episodes of atrial flutter.  Pt is anticoagulated.  Battery status: satisfactory         Final Parameters  Mode:  AAIR+  Lower rate: 60 bpm   Upper rate: 130 bpm  AV Delay: paced- 180 ms  Sensed-150 ms  Atrial - Amplitude: 1.5 V   Pulse width: 0.4 ms   Sensitivity: 0.6 mV     Ventricular - Amplitude: 2.0 V  Pulse width: 0.4 ms  Sensitivity: 0.9 mV    Changes made: n/a  Conclusions: normal pacemaker function    Follow up: 3 months

## 2019-11-08 ENCOUNTER — CARE COORDINATION (OUTPATIENT)
Dept: CARE COORDINATION | Age: 69
End: 2019-11-08

## 2019-11-08 ENCOUNTER — OFFICE VISIT (OUTPATIENT)
Dept: INTERNAL MEDICINE | Age: 69
End: 2019-11-08
Payer: MEDICARE

## 2019-11-08 VITALS
HEART RATE: 79 BPM | HEIGHT: 67 IN | BODY MASS INDEX: 39.71 KG/M2 | DIASTOLIC BLOOD PRESSURE: 90 MMHG | TEMPERATURE: 96.9 F | WEIGHT: 253 LBS | OXYGEN SATURATION: 97 % | SYSTOLIC BLOOD PRESSURE: 160 MMHG

## 2019-11-08 DIAGNOSIS — R60.0 LOWER EXTREMITY EDEMA: ICD-10-CM

## 2019-11-08 DIAGNOSIS — I10 ESSENTIAL HYPERTENSION: Primary | ICD-10-CM

## 2019-11-08 PROCEDURE — G8482 FLU IMMUNIZE ORDER/ADMIN: HCPCS | Performed by: PHYSICIAN ASSISTANT

## 2019-11-08 PROCEDURE — G8427 DOCREV CUR MEDS BY ELIG CLIN: HCPCS | Performed by: PHYSICIAN ASSISTANT

## 2019-11-08 PROCEDURE — G8598 ASA/ANTIPLAT THER USED: HCPCS | Performed by: PHYSICIAN ASSISTANT

## 2019-11-08 PROCEDURE — 1090F PRES/ABSN URINE INCON ASSESS: CPT | Performed by: PHYSICIAN ASSISTANT

## 2019-11-08 PROCEDURE — 1123F ACP DISCUSS/DSCN MKR DOCD: CPT | Performed by: PHYSICIAN ASSISTANT

## 2019-11-08 PROCEDURE — G8417 CALC BMI ABV UP PARAM F/U: HCPCS | Performed by: PHYSICIAN ASSISTANT

## 2019-11-08 PROCEDURE — 4040F PNEUMOC VAC/ADMIN/RCVD: CPT | Performed by: PHYSICIAN ASSISTANT

## 2019-11-08 PROCEDURE — 99213 OFFICE O/P EST LOW 20 MIN: CPT | Performed by: PHYSICIAN ASSISTANT

## 2019-11-08 PROCEDURE — G8400 PT W/DXA NO RESULTS DOC: HCPCS | Performed by: PHYSICIAN ASSISTANT

## 2019-11-08 PROCEDURE — 3017F COLORECTAL CA SCREEN DOC REV: CPT | Performed by: PHYSICIAN ASSISTANT

## 2019-11-08 PROCEDURE — 1036F TOBACCO NON-USER: CPT | Performed by: PHYSICIAN ASSISTANT

## 2019-11-08 ASSESSMENT — ENCOUNTER SYMPTOMS
RHINORRHEA: 0
EYE PAIN: 0
SORE THROAT: 0
COUGH: 0
EYE REDNESS: 0
COLOR CHANGE: 0
CHEST TIGHTNESS: 0
WHEEZING: 0
ABDOMINAL PAIN: 0
PHOTOPHOBIA: 0
CONSTIPATION: 0
SINUS PRESSURE: 0
VOMITING: 0
SHORTNESS OF BREATH: 0
DIARRHEA: 0
NAUSEA: 0

## 2019-11-14 ENCOUNTER — CARE COORDINATION (OUTPATIENT)
Dept: CARE COORDINATION | Age: 69
End: 2019-11-14

## 2019-11-22 ENCOUNTER — HOSPITAL ENCOUNTER (OUTPATIENT)
Dept: INFUSION THERAPY | Age: 69
Discharge: HOME | End: 2019-11-22
Payer: MEDICARE

## 2019-11-22 ENCOUNTER — OFFICE VISIT (OUTPATIENT)
Dept: HEMATOLOGY | Age: 69
End: 2019-11-22
Payer: MEDICARE

## 2019-11-22 VITALS
BODY MASS INDEX: 39.07 KG/M2 | HEIGHT: 67 IN | OXYGEN SATURATION: 98 % | DIASTOLIC BLOOD PRESSURE: 110 MMHG | WEIGHT: 248.9 LBS | HEART RATE: 75 BPM | SYSTOLIC BLOOD PRESSURE: 162 MMHG

## 2019-11-22 DIAGNOSIS — E03.9 HYPOTHYROIDISM, UNSPECIFIED TYPE: ICD-10-CM

## 2019-11-22 DIAGNOSIS — Z79.01 ANTICOAGULATED ON COUMADIN: ICD-10-CM

## 2019-11-22 DIAGNOSIS — D69.6 THROMBOCYTOPENIA (HCC): ICD-10-CM

## 2019-11-22 DIAGNOSIS — D50.9 IRON DEFICIENCY ANEMIA, UNSPECIFIED IRON DEFICIENCY ANEMIA TYPE: Primary | ICD-10-CM

## 2019-11-22 DIAGNOSIS — D50.9 IRON DEFICIENCY ANEMIA, UNSPECIFIED IRON DEFICIENCY ANEMIA TYPE: ICD-10-CM

## 2019-11-22 PROCEDURE — 3017F COLORECTAL CA SCREEN DOC REV: CPT | Performed by: NURSE PRACTITIONER

## 2019-11-22 PROCEDURE — 36415 COLL VENOUS BLD VENIPUNCTURE: CPT

## 2019-11-22 PROCEDURE — 1090F PRES/ABSN URINE INCON ASSESS: CPT | Performed by: NURSE PRACTITIONER

## 2019-11-22 PROCEDURE — G8598 ASA/ANTIPLAT THER USED: HCPCS | Performed by: NURSE PRACTITIONER

## 2019-11-22 PROCEDURE — 83540 ASSAY OF IRON: CPT

## 2019-11-22 PROCEDURE — G8482 FLU IMMUNIZE ORDER/ADMIN: HCPCS | Performed by: NURSE PRACTITIONER

## 2019-11-22 PROCEDURE — G8400 PT W/DXA NO RESULTS DOC: HCPCS | Performed by: NURSE PRACTITIONER

## 2019-11-22 PROCEDURE — 84443 ASSAY THYROID STIM HORMONE: CPT

## 2019-11-22 PROCEDURE — 99211 OFF/OP EST MAY X REQ PHY/QHP: CPT

## 2019-11-22 PROCEDURE — G8427 DOCREV CUR MEDS BY ELIG CLIN: HCPCS | Performed by: NURSE PRACTITIONER

## 2019-11-22 PROCEDURE — 85025 COMPLETE CBC W/AUTO DIFF WBC: CPT

## 2019-11-22 PROCEDURE — 1036F TOBACCO NON-USER: CPT | Performed by: NURSE PRACTITIONER

## 2019-11-22 PROCEDURE — 82746 ASSAY OF FOLIC ACID SERUM: CPT

## 2019-11-22 PROCEDURE — 1123F ACP DISCUSS/DSCN MKR DOCD: CPT | Performed by: NURSE PRACTITIONER

## 2019-11-22 PROCEDURE — 83550 IRON BINDING TEST: CPT

## 2019-11-22 PROCEDURE — G8417 CALC BMI ABV UP PARAM F/U: HCPCS | Performed by: NURSE PRACTITIONER

## 2019-11-22 PROCEDURE — 82728 ASSAY OF FERRITIN: CPT

## 2019-11-22 PROCEDURE — 99213 OFFICE O/P EST LOW 20 MIN: CPT | Performed by: NURSE PRACTITIONER

## 2019-11-22 PROCEDURE — 4040F PNEUMOC VAC/ADMIN/RCVD: CPT | Performed by: NURSE PRACTITIONER

## 2019-11-22 ASSESSMENT — ENCOUNTER SYMPTOMS
EYE PAIN: 0
RESPIRATORY NEGATIVE: 1
BACK PAIN: 0
EYE REDNESS: 0
SHORTNESS OF BREATH: 0
WHEEZING: 0
BLOOD IN STOOL: 0
GASTROINTESTINAL NEGATIVE: 1
EYE DISCHARGE: 0
EYES NEGATIVE: 1
NAUSEA: 0
SORE THROAT: 0
ABDOMINAL PAIN: 0
VOMITING: 0
CONSTIPATION: 0
COUGH: 0
DIARRHEA: 0

## 2019-12-02 ENCOUNTER — ANTI-COAG VISIT (OUTPATIENT)
Dept: INTERNAL MEDICINE | Age: 69
End: 2019-12-02
Payer: MEDICARE

## 2019-12-02 DIAGNOSIS — Z79.01 ANTICOAGULATED ON COUMADIN: ICD-10-CM

## 2019-12-02 LAB — INR BLD: 1.2

## 2019-12-02 PROCEDURE — 93793 ANTICOAG MGMT PT WARFARIN: CPT | Performed by: INTERNAL MEDICINE

## 2019-12-06 ENCOUNTER — OFFICE VISIT (OUTPATIENT)
Dept: NEUROLOGY | Facility: CLINIC | Age: 69
End: 2019-12-06

## 2019-12-06 VITALS
DIASTOLIC BLOOD PRESSURE: 112 MMHG | BODY MASS INDEX: 39.24 KG/M2 | HEIGHT: 67 IN | WEIGHT: 250 LBS | OXYGEN SATURATION: 98 % | HEART RATE: 65 BPM | SYSTOLIC BLOOD PRESSURE: 202 MMHG

## 2019-12-06 DIAGNOSIS — R60.0 EDEMA OF LEFT LOWER EXTREMITY: ICD-10-CM

## 2019-12-06 DIAGNOSIS — G47.33 OBSTRUCTIVE SLEEP APNEA SYNDROME: ICD-10-CM

## 2019-12-06 DIAGNOSIS — E03.9 HYPOTHYROIDISM, UNSPECIFIED TYPE: ICD-10-CM

## 2019-12-06 DIAGNOSIS — F33.2 SEVERE EPISODE OF RECURRENT MAJOR DEPRESSIVE DISORDER, WITHOUT PSYCHOTIC FEATURES (HCC): Primary | ICD-10-CM

## 2019-12-06 PROCEDURE — 99213 OFFICE O/P EST LOW 20 MIN: CPT | Performed by: PHYSICIAN ASSISTANT

## 2019-12-06 RX ORDER — ESCITALOPRAM OXALATE 20 MG/1
20 TABLET ORAL DAILY
Qty: 30 TABLET | Refills: 2 | Status: SHIPPED | OUTPATIENT
Start: 2019-12-06

## 2019-12-06 RX ORDER — ESCITALOPRAM OXALATE 10 MG/1
20 TABLET ORAL DAILY
Qty: 30 TABLET | Refills: 2 | Status: SHIPPED | OUTPATIENT
Start: 2019-12-06 | End: 2019-12-06 | Stop reason: DRUGHIGH

## 2019-12-06 NOTE — PROGRESS NOTES
Neurology Progress Note      Chief Complaint:    FRANCIE  Depression  LLE swelling    Subjective     Subjective:  Patient returns to clinic today for follow-up of obstructive sleep apnea.  She only just 2 days ago received her new mask from her [employer] company and so she is now reusing the machine once again.  So, therefore, I do not have an updated download regarding her BiPAP compliance.    The patient continues have significant difficulties with depression.  This is secondary to numerous psychosocial stressors including caring for her autistic granddaughter, workplace difficulties, etc.  She has not discussed this with her family physician, Dr. Jackson.    Patient also states that she has had increased welling of the left lower extremity from the knee distally.  She does have chronic bilateral lower extremity edema secondary to osteoarthritis and chronic lower extremity venous insufficiency.  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  However, her most recent INR was subtherapeutic below 1.5.  However, she does not have increasing pain, heat, warmth, etc., just notices some slight increase in edema of the left lower extremity.  She has not yet discussed this with her primary care physician.      Past Medical History:   Diagnosis Date   • Anxiety    • Arrhythmia    • Blood clot in vein 05/2018    Hospitalized    • Redd-tachy syndrome (CMS/HCC)    • Bradycardia    • Breath shortness    • Burn     left leg   • Cardiac pacemaker     11/09 MED [facility]   • Chest pain    • Chronic fatigue    • Depression    • Diabetes mellitus (CMS/HCC)    • Dizziness    • Fatigue    • Follow-up exam    • Heart burn    • Hypercoagulable state, primary (CMS/HCC)     LUPUS ANTI-COAG   • Hyperlipidemia    • Hypertension    • Liver disease    • Shortness of breath    • Sleep apnea     complex.  using a c-pap machine   • Stroke (CMS/HCC)    • Syncope    • Tachy-redd syndrome (CMS/HCC)      Past Surgical History:   Procedure  Laterality Date   • APPENDECTOMY     • CATARACT EXTRACTION     • CHOLECYSTECTOMY     • HERNIA REPAIR     • HYSTERECTOMY     • INSERT / REPLACE / REMOVE PACEMAKER     • OOPHORECTOMY     • PACEMAKER IMPLANTATION     • REPLACEMENT TOTAL KNEE Right 03/26/2018    Dr doherty    • TRAM-EN-Y PROCEDURE  2002    Dr Dahiana [facility]   • SPLENECTOMY       Family History   Problem Relation Age of Onset   • Coronary artery disease Mother    • Hyperlipidemia Mother    • Anemia Mother    • Cervical cancer Mother    • Kidney failure Father    • Heart failure Father    • Fibromyalgia Sister    • Anemia Sister    • Clotting disorder Sister    • Alcohol abuse Brother    • Cancer Maternal Grandmother    • Diabetes Maternal Grandmother    • Heart failure Maternal Grandfather    • No Known Problems Paternal Grandmother    • No Known Problems Paternal Grandfather    • Colon cancer Brother      Social History     Tobacco Use   • Smoking status: Never Smoker   • Smokeless tobacco: Never Used   Substance Use Topics   • Alcohol use: No   • Drug use: No       Medications:  Current Outpatient Medications   Medication Sig Dispense Refill   • aspirin 81 MG EC tablet daily.     • bumetanide (BUMEX) 1 MG tablet Take 1 mg by mouth Daily.     • calcipotriene (DOVONEX) 0.005 % cream Apply  topically to the appropriate area as directed As Needed.     • clobetasol (TEMOVATE) 0.05 % cream Apply  topically 2 (Two) Times a Day.     • CloNIDine (CATAPRES) 0.1 MG tablet Take 0.1 mg by mouth 2 (Two) Times a Day As Needed.     • cyanocobalamin 1000 MCG/ML injection Inject 1,000 mcg into the shoulder, thigh, or buttocks Every 28 (Twenty-Eight) Days.     • diclofenac (VOLTAREN) 1 % gel gel Apply 4 g topically to the appropriate area as directed 2 (Two) Times a Day.     • DULoxetine (CYMBALTA) 60 MG capsule Take 60 mg by mouth Daily.     • ergocalciferol (ERGOCALCIFEROL) 65487 UNITS capsule Take 1 capsule by mouth 1 (One) Time Per Week. (Patient taking  differently: Take 50,000 Units by mouth 2 (Two) Times a Day.) 4 capsule 3   • escitalopram (LEXAPRO) 10 MG tablet Take 2 tablets by mouth Daily. 30 tablet 2   • gabapentin (NEURONTIN) 300 MG capsule 3 (Three) Times a Day.     • levothyroxine (SYNTHROID, LEVOTHROID) 100 MCG tablet Take  by mouth.     • lisinopril (PRINIVIL,ZESTRIL) 20 MG tablet Take 20 mg by mouth Daily.     • metoprolol tartrate (LOPRESSOR) 25 MG tablet Take 2 tablets by mouth 2 (Two) Times a Day. 60 tablet 11   • Multiple Vitamins-Minerals (CENTRUM MULTIGUMMIES) chewable tablet Chew Daily.     • pantoprazole (PROTONIX) 40 MG EC tablet 2 (two) times a day.     • potassium chloride (K-DUR,KLOR-CON) 10 MEQ CR tablet Take  by mouth.     • pregabalin (LYRICA) 75 MG capsule Take 75 mg by mouth 3 (Three) Times a Day.     • raNITIdine (ZANTAC) 150 MG tablet Take 150 mg by mouth Every 12 (Twelve) Hours As Needed for Heartburn.     • tiotropium (SPIRIVA HANDIHALER) 18 MCG per inhalation capsule Place 18 mcg into inhaler and inhale.     • warfarin (COUMADIN) 7.5 MG tablet Take 7.5 mg by mouth. Take 7.5mg  1 po daily managed by pcp Dr. Jackson       No current facility-administered medications for this visit.        Allergies:    Bee venom; Insect extract allergy skin test; Percocet [oxycodone-acetaminophen]; Prednisone; Ultram [tramadol hcl]; Tizanidine hcl; Hydrocodone-acetaminophen; and Other    Review of Systems:   -A 14 point review of systems is completed and is negative.      Objective      Vital Signs  Heart Rate:  [65] 65  BP: (202)/(112) 202/112    Physical Exam:    General Exam:  Head:  Normocephalic, atraumatic.  HEENT: PERRLA.  Full EOM.  Neck:  No lymphadenopathy, thyromegaly or bruit.  Cardiac:  Regular rate and rhythm.  Normal S1, S2.  No murmur, rub or gallop.  Lungs:  Clear to auscultation bilaterally.  No wheeze, rales or rhonchi.  Abdomen:  Non-tender, Non-distended.  Bowel sounds normoactive.  Extremities: Full peripheral pulses.  No  clubbing, cyanosis or edema.  Skin: No ulceration, breakdown or rash.      Neurologic Exam:  Mental Status:    -Awake. Alert. Oriented to person, place & time.  -No word finding difficulties.  -No aphasia.  -No dysarthria.  -Follows simple commands.     CN II:  Full visual fields with confrontation.  Pupils equally reactive to light.  CN III, IV, VI:  Extraocular muscles function intact with no nystagmus.  CN V:  Facial sensory is symmetric.  CN VII:  Facial motor symmetric.  CN VIII:  Gross hearing intact bilaterally.  CN IX/X:  Palate elevates symmetrically.  CN XI:  Shoulder shrug symmetric.  CN XII:  Tongue is midline on protrusion.     Motor: (strength out of 5:  1= minimal movement, 2 = movement in plane of gravity, 3 = movement against gravity, 4 = movement against some resistance, 5 = full strength)     -5/5 in bilateral biceps, triceps, brachioradialis, wrist extensors and intrinsic muscles of the hand.    -5/5 in bilateral hip flexors, quadriceps, hamstrings, gastrocsoleus complex, anterior tibialis and extensor hallucis longus.       Deep Tendon Reflexes:  -Right              Bicep: 2+         Triceps: 2+      Brachioradialis: 2+              Patella: 2+       Ankle: 2+         Babinski:  negative  -Left              Bicep: 2+         Triceps: 2+      Brachioradialis: 2+              Patella: 2+       Ankle: 2+         Babinski:  negative     Sensory:  -Intact to light touch, pinprick BUE (C5-T1) and BLE (L2-S1).     Coordination:  -Finger to nose intact BUEs  -Heel to shin intact BLEs  -No ataxia     Gait  -No signs of ataxia  -ambulates unassisted       Results Review:    I reviewed the patient's new clinical results and findings.      No components found for: A1C  Lab Results   Component Value Date    HDL 95 09/10/2019    LDL 88 09/10/2019     No components found for: B12  Lab Results   Component Value Date    TSH 5.210 (H) 09/10/2019       Assessment/Plan     Impression:  1.  Obstructive sleep apnea  with BiPAP compliance  2.  Possible underlying depression  3.  Restless leg syndrome  4.  Daytime hypersomnolence  5.  Anemia, multifactorial  6.  Psychosocial stressors  7.  Hypothyroidism    Plan:  1.  I instructed her to contact Dr. Jackson, her family physician, immediately following this appointment to see if she does want to look into the left lower extremity swelling further, however, I believe it is somewhat negligible when compared to the edema of the right.  It does not appear to be significantly different than her baseline, does not have calf tenderness and does not have increased heat or warmth.    2.  Continue Coumadin and follow her INR closely with Dr. Jackson, for therapeutic monitoring.    3.  Continue to follow TSH as last 1 was 5.210 on 9/10/2019.  Defer to primary care.    4.  Increase Lexapro to 20 mg daily.  Observe for possible serotonin syndrome given concurrent use of Cymbalta.    5.  Recommend outpatient counseling.  She is going to discuss this further with her family physician.    6.  Follow-up in 3 months for FRANCIE surveillance.  I instructed on importance of compliance and she voices understanding to this.  She is now able to use this now that she has a better fitment of the mask.    Patient voices understanding and agreement with plan of care and will call for any concerns or questions in the interim.  We reviewed pertinent diagnostic studies and the potential risks and benefits of the prescribed regimen of treatment.    We discussed compliance of the prescribed treatment regimen and instructions on medication, therapy, physical activity, etc. and potential for improvement and impact these have on their healing/recovery and risk reduction in the future.    I spent greater than 20 minutes of a 25-minute encounter in direct face to face contact with the patient with greater than 50% of the time being spent in education and counseling.              Antoine Acuna PA-C  12/06/19  9:05 AM

## 2019-12-16 ENCOUNTER — PATIENT MESSAGE (OUTPATIENT)
Dept: INTERNAL MEDICINE | Age: 69
End: 2019-12-16

## 2019-12-19 ENCOUNTER — CLINICAL SUPPORT (OUTPATIENT)
Dept: CARDIOLOGY | Facility: CLINIC | Age: 69
End: 2019-12-19

## 2019-12-19 DIAGNOSIS — Z95.0 PACEMAKER: ICD-10-CM

## 2019-12-20 NOTE — PROGRESS NOTES
Dual Chamber Pacemaker Evaluation Report  [facility]    December 19, 2019    Primary Cardiologist: Mynor  : Medtronic Model: EnRhythm  Implant date: 11/13/09    Reason for evaluation: routine  Indication for pacemaker: sick sinus syndrome    Measurements  Atrial sensing - P wave: 2.1 mV  Atrial threshold: n/r  Atrial lead impedance: 512 ohms  Ventricular sensing - R wave: 4.4 mV  Ventricular threshold: n/r  Ventricular lead impedance:   488 ohms     Diagnostic Data  Atrial paced: 87.5 %  Ventricular paced: 0.8 %  Other: Few episodes of SVT all around 2 sec.  Battery status: intensify follow up       Final Parameters  Mode:  AAIR+  Lower rate: 60 bpm   Upper rate: 130 bpm  AV Delay: paced- 180 ms  Sensed-150 ms  Atrial - Amplitude: 1.5 V   Pulse width: 0.4 ms   Sensitivity: 0.6 mV     Ventricular - Amplitude: 2.0 V  Pulse width: 0.4 ms  Sensitivity: 0.9 mV    Changes made: n/a  Conclusions: normal pacemaker function    Follow up: 3 months

## 2019-12-25 PROBLEM — E03.9 HYPOTHYROIDISM: Status: ACTIVE | Noted: 2019-12-25

## 2019-12-25 PROBLEM — D69.6 THROMBOCYTOPENIA (HCC): Status: ACTIVE | Noted: 2019-12-25

## 2020-01-02 ENCOUNTER — TELEPHONE (OUTPATIENT)
Dept: CARDIOLOGY | Facility: CLINIC | Age: 70
End: 2020-01-02

## 2020-01-02 NOTE — TELEPHONE ENCOUNTER
Patient notified that pacemaker transmission was received.  Instructed patient to send in a remote transmission on 1/20/2020 so we can check the battery as device battery is nearing RRT.  Understanding verbalized.

## 2020-01-02 NOTE — TELEPHONE ENCOUNTER
This pt called and left a vm on my phone concerning her pacemaker. She wants to know status of her carelink from 12/19/19? This is a Dr. Lowe pt. Please call the pt,

## 2020-01-07 ENCOUNTER — ANTI-COAG VISIT (OUTPATIENT)
Dept: INTERNAL MEDICINE | Age: 70
End: 2020-01-07
Payer: MEDICARE

## 2020-01-07 LAB — INR BLD: 1.6

## 2020-01-07 PROCEDURE — 93793 ANTICOAG MGMT PT WARFARIN: CPT | Performed by: INTERNAL MEDICINE

## 2020-01-07 RX ORDER — PREGABALIN 75 MG/1
75 CAPSULE ORAL 3 TIMES DAILY
Qty: 90 CAPSULE | Refills: 2 | Status: SHIPPED | OUTPATIENT
Start: 2020-01-07 | End: 2020-01-13 | Stop reason: SDUPTHER

## 2020-01-07 NOTE — PROGRESS NOTES
HOME MONITORING REPORT    INR today:   Results for orders placed or performed in visit on 01/07/20   Protime-INR   Result Value Ref Range    INR 1.60        INR Goal: 2.0-3.0    Dosing Plan  As of 1/7/2020    TTR:   16.6 % (1.7 y)   Full warfarin instructions:   1/7: 15 mg; Otherwise 7.5 mg every day            PLAN:PATIENT NOTIFIED TO TAKE 15 MG TONIGHT AND THEN CONTINUE CURRENT DOSE AND RECHECK IN ONE WEEK.

## 2020-01-07 NOTE — TELEPHONE ENCOUNTER
Sarbjit Franco called to request a refill on her medication. Last office visit : 11/8/2019   Next office visit : 3/6/2020     Last UDS: No results found for: Lisette Lamont, LABBENZ, BUPRENUR, COCAIMETSCRU, GABAPENTIN, MDMA, METAMPU, OPIATESCREENURINE, OXTCOSU, PHENCYCLIDINESCREENURINE, PROPOXYPHENE, THCSCREENUR, TRICYUR    Last Jose: 1/7/20  Medication Contract: 6/7/19     Requested Prescriptions     Pending Prescriptions Disp Refills    pregabalin (LYRICA) 75 MG capsule 90 capsule 2     Sig: Take 1 capsule by mouth 3 times daily for 90 days. Please approve or refuse this medication.    Chastity Alan

## 2020-01-13 ENCOUNTER — OFFICE VISIT (OUTPATIENT)
Dept: INTERNAL MEDICINE | Age: 70
End: 2020-01-13
Payer: MEDICARE

## 2020-01-13 VITALS
HEART RATE: 67 BPM | BODY MASS INDEX: 39.24 KG/M2 | SYSTOLIC BLOOD PRESSURE: 210 MMHG | DIASTOLIC BLOOD PRESSURE: 120 MMHG | OXYGEN SATURATION: 99 % | HEIGHT: 67 IN | WEIGHT: 250 LBS

## 2020-01-13 PROBLEM — E66.01 MORBIDLY OBESE (HCC): Status: ACTIVE | Noted: 2020-01-13

## 2020-01-13 PROBLEM — J45.909 ASTHMATIC BRONCHITIS WITHOUT COMPLICATION: Status: ACTIVE | Noted: 2020-01-13

## 2020-01-13 PROBLEM — E11.21 TYPE II DIABETES MELLITUS WITH NEPHROPATHY (HCC): Status: ACTIVE | Noted: 2019-08-02

## 2020-01-13 PROCEDURE — 99214 OFFICE O/P EST MOD 30 MIN: CPT | Performed by: INTERNAL MEDICINE

## 2020-01-13 PROCEDURE — G8400 PT W/DXA NO RESULTS DOC: HCPCS | Performed by: INTERNAL MEDICINE

## 2020-01-13 PROCEDURE — 3046F HEMOGLOBIN A1C LEVEL >9.0%: CPT | Performed by: INTERNAL MEDICINE

## 2020-01-13 PROCEDURE — 2022F DILAT RTA XM EVC RTNOPTHY: CPT | Performed by: INTERNAL MEDICINE

## 2020-01-13 PROCEDURE — G8417 CALC BMI ABV UP PARAM F/U: HCPCS | Performed by: INTERNAL MEDICINE

## 2020-01-13 PROCEDURE — 3017F COLORECTAL CA SCREEN DOC REV: CPT | Performed by: INTERNAL MEDICINE

## 2020-01-13 PROCEDURE — 1123F ACP DISCUSS/DSCN MKR DOCD: CPT | Performed by: INTERNAL MEDICINE

## 2020-01-13 PROCEDURE — 1036F TOBACCO NON-USER: CPT | Performed by: INTERNAL MEDICINE

## 2020-01-13 PROCEDURE — G8427 DOCREV CUR MEDS BY ELIG CLIN: HCPCS | Performed by: INTERNAL MEDICINE

## 2020-01-13 PROCEDURE — G8482 FLU IMMUNIZE ORDER/ADMIN: HCPCS | Performed by: INTERNAL MEDICINE

## 2020-01-13 PROCEDURE — 1090F PRES/ABSN URINE INCON ASSESS: CPT | Performed by: INTERNAL MEDICINE

## 2020-01-13 PROCEDURE — 4040F PNEUMOC VAC/ADMIN/RCVD: CPT | Performed by: INTERNAL MEDICINE

## 2020-01-13 RX ORDER — QUINIDINE GLUCONATE 324 MG
240 TABLET, EXTENDED RELEASE ORAL
Qty: 270 TABLET | Refills: 2 | Status: SHIPPED | OUTPATIENT
Start: 2020-01-13 | End: 2021-03-18

## 2020-01-13 RX ORDER — FERROUS SULFATE 325(65) MG
325 TABLET ORAL 2 TIMES DAILY
COMMUNITY
End: 2020-01-13

## 2020-01-13 RX ORDER — FERROUS SULFATE 325(65) MG
325 TABLET ORAL 2 TIMES DAILY
Qty: 180 TABLET | Refills: 1
Start: 2020-01-13 | End: 2020-01-13

## 2020-01-13 RX ORDER — ESCITALOPRAM OXALATE 20 MG/1
20 TABLET ORAL DAILY
COMMUNITY
End: 2020-01-13 | Stop reason: SDUPTHER

## 2020-01-13 RX ORDER — PANTOPRAZOLE SODIUM 40 MG/1
40 GRANULE, DELAYED RELEASE ORAL
Qty: 30 EACH | Refills: 3
Start: 2020-01-13 | End: 2020-03-28 | Stop reason: SDUPTHER

## 2020-01-13 RX ORDER — ESCITALOPRAM OXALATE 20 MG/1
20 TABLET ORAL DAILY
Qty: 90 TABLET | Refills: 2
Start: 2020-01-13 | End: 2020-12-14 | Stop reason: SDUPTHER

## 2020-01-13 RX ORDER — POTASSIUM CHLORIDE 750 MG/1
10 TABLET, EXTENDED RELEASE ORAL DAILY
Qty: 90 TABLET | Refills: 3 | Status: SHIPPED | OUTPATIENT
Start: 2020-01-13 | End: 2020-11-20

## 2020-01-13 RX ORDER — MULTIVITAMIN/IRON/FOLIC ACID 18MG-0.4MG
1 TABLET ORAL DAILY
COMMUNITY

## 2020-01-13 RX ORDER — LEVOTHYROXINE SODIUM 0.1 MG/1
100 TABLET ORAL DAILY
Qty: 90 TABLET | Refills: 3
Start: 2020-01-13 | End: 2020-03-28 | Stop reason: SDUPTHER

## 2020-01-13 RX ORDER — LISINOPRIL 40 MG/1
40 TABLET ORAL DAILY
Qty: 90 TABLET | Refills: 0 | Status: SHIPPED | OUTPATIENT
Start: 2020-01-13 | End: 2020-12-28

## 2020-01-13 RX ORDER — PREGABALIN 75 MG/1
75 CAPSULE ORAL 3 TIMES DAILY
Qty: 90 CAPSULE | Refills: 2 | Status: ON HOLD
Start: 2020-01-13 | End: 2020-09-23 | Stop reason: ALTCHOICE

## 2020-01-13 RX ORDER — BUMETANIDE 2 MG/1
2 TABLET ORAL DAILY
Qty: 30 TABLET | Refills: 5
Start: 2020-01-13 | End: 2021-03-18 | Stop reason: SDUPTHER

## 2020-01-13 NOTE — PATIENT INSTRUCTIONS
ibuprofen. Some of these medicines can raise blood pressure. · Learn how to check your blood pressure at home. Lifestyle changes  · Stay at a healthy weight. This is especially important if you put on weight around the waist. Losing even 10 pounds can help you lower your blood pressure. · If your doctor recommends it, get more exercise. Walking is a good choice. Bit by bit, increase the amount you walk every day. Try for at least 30 minutes on most days of the week. You also may want to swim, bike, or do other activities. · Avoid or limit alcohol. Talk to your doctor about whether you can drink any alcohol. · Try to limit how much sodium you eat to less than 2,300 milligrams (mg) a day. Your doctor may ask you to try to eat less than 1,500 mg a day. · Eat plenty of fruits (such as bananas and oranges), vegetables, legumes, whole grains, and low-fat dairy products. · Lower the amount of saturated fat in your diet. Saturated fat is found in animal products such as milk, cheese, and meat. Limiting these foods may help you lose weight and also lower your risk for heart disease. · Do not smoke. Smoking increases your risk for heart attack and stroke. If you need help quitting, talk to your doctor about stop-smoking programs and medicines. These can increase your chances of quitting for good. When should you call for help? Call  911 anytime you think you may need emergency care. This may mean having symptoms that suggest that your blood pressure is causing a serious heart or blood vessel problem. Your blood pressure may be over 180/120.   For example, call  911 if:    · You have symptoms of a heart attack. These may include:  ? Chest pain or pressure, or a strange feeling in the chest.  ? Sweating. ? Shortness of breath. ? Nausea or vomiting. ? Pain, pressure, or a strange feeling in the back, neck, jaw, or upper belly or in one or both shoulders or arms. ? Lightheadedness or sudden weakness.   ? A fast or

## 2020-01-13 NOTE — PROGRESS NOTES
Chief Complaint   Patient presents with    3 Month Follow-Up    Medication Check       HPI: Yoana Lewis is a 78 y.o. female is here for discussion when and how to take her medications. 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 She has not taken most of her other medications since 2018. For, she has large bags that have full bottles of her medications in them. Is not quite sure why she is not taking her medications that she cannot give me a straight answer. She has another woman in the room with her that says that she did not like taking 21 medications per day. I told the patient and this lady that was in the room, that when things have been like uncontrolled blood pressure with someone is not taking her medication and tells us that they are, this is a good weight for medications to be added to the regimen. This patient has told me on several occasions that she is taking every medication that has been prescribed for her. There is also a baby in the room that is screaming. This visit took close to an hour. It took a while to reconcile her medications and try to make a determination for what this patient should be taking. Her blood pressure is quite elevated. She is taking 15 mg of Coumadin per day. She states that she has been eating a lot of cabbage and greens. This is why her INR is only around 1.6. She states that sometimes, her blood sugars get low at times. Unfortunately, the medical assistant Mindy Garcias her is taking all of her medications, which only complicated the medication reconciliation today. She reports that she has been crying quite a bit. She has been quite depressed. We will definitely resume her Lexapro. Her neuropathy is stable. She states that she cannot walk because of the neuropathy. However, she is not taking either gabapentin or the Lyrica as prescribed.     Past Medical History:   Diagnosis Date    Abdominal pain     Abnormal EKG     Acute sinusitis     Allergic reaction to spider bite     Anemia     Anticoagulated     Anxiety     Arrhythmia     Asthmatic bronchitis without complication 7/12/4842    Ataxic gait     Lyons's esophagus     Bowel obstruction (HCC)     Callus     Cardiac pacemaker     CKD (chronic kidney disease) stage 2, GFR 60-89 ml/min     Coat's syndrome     Coat's syndrome     both eyes    Depression     Diabetes mellitus type 2 in nonobese (HCC)     Diabetic nephropathy (HCC)     Disequilibrium     Dizziness     DVT (deep venous thrombosis) (HCC)     Exudative retinopathy     Fibromyalgia     Fibromyositis     Gastric ulcer     GERD (gastroesophageal reflux disease)     History of gastric bypass     Hx of lupus anticoagulant disorder     Hypertension     Hypothyroidism     Intermittent claudication (HCC)     Intestinal obstruction (HCC)     Iron deficiency     Left-sided weakness     Low vitamin D level     Lupus (HCC)     Menopause     Obesity     Osteoarthritis     Osteoporosis     Other iron deficiency anemias     Pernicious anemia     PUPP (pruritic urticarial papules and plaques of pregnancy)     Right leg numbness     Right sided sciatica     Sarcoidosis     with liver involvement    Sciatica     Secondary hyperparathyroidism (HCC)     Shingles     Shortness of breath     Sleep apnea     Stomach ulcer     Syncope     Visual loss, one eye       Past Surgical History:   Procedure Laterality Date    APPENDECTOMY      CARDIAC [facility]HE[facility]IZATION  [facility]/13  [facility]    EF over 60%    CHOLECYSTECTOMY      COLONOSCOPY  2/2010    negative    COLONOSCOPY  2/22/10    Dr Letha Martinez    COLONOSCOPY  4/1/16    Dr LAKHWINDER Horvath-internal hemorrhoids, 5 yr recall    EYE SURGERY      Cyst on Right eye    EYE SURGERY      GASTRIC BYPASS SURGERY      GASTRIC BYPASS SURGERY      HERNIA REPAIR      HYSTERECTOMY      Complete    HYSTERECTOMY      Partial - because had a tubal pregnancy.      INCONTINENCE SURGERY Not on file     Active member of club or organization: Not on file     Attends meetings of clubs or organizations: Not on file     Relationship status: Not on file    Intimate partner violence:     Fear of current or ex partner: Not on file     Emotionally abused: Not on file     Physically abused: Not on file     Forced sexual activity: Not on file   Other Topics Concern    Not on file   Social History Narrative    Not on file      Family History   Problem Relation Age of Onset    Uterine Cancer Mother     Cervical Cancer Mother     Coronary Art Dis Mother     Heart Disease Father     Lung Cancer Father     Other Father         renal failure    Colon Cancer Brother     Colon Polyps Brother     Uterine Cancer Maternal Grandmother     Cervical Cancer Maternal Grandmother     Diabetes Maternal Grandmother     Cervical Cancer Sister     Other Sister         fibromyalgia    Diabetes Paternal Aunt     Esophageal Cancer Neg Hx     Liver Cancer Neg Hx     Liver Disease Neg Hx     Stomach Cancer Neg Hx     Rectal Cancer Neg Hx         Current Outpatient Medications   Medication Sig Dispense Refill    escitalopram (LEXAPRO) 20 MG tablet Take 1 tablet by mouth daily 90 tablet 2    pregabalin (LYRICA) 75 MG capsule Take 1 capsule by mouth 3 times daily for 90 days.  90 capsule 2    lisinopril (PRINIVIL;ZESTRIL) 40 MG tablet Take 1 tablet by mouth daily 90 tablet 0    bumetanide (BUMEX) 2 MG tablet Take 1 tablet by mouth daily 30 tablet 5    levothyroxine (SYNTHROID) 100 MCG tablet Take 1 tablet by mouth Daily 90 tablet 3    pantoprazole sodium (PROTONIX) 40 MG PACK packet Take 1 packet by mouth every morning (before breakfast) 30 each 3    tiotropium (SPIRIVA HANDIHALER) 18 MCG inhalation capsule Inhale 1 capsule into the lungs daily 90 capsule 1    potassium chloride (KLOR-CON M) 10 MEQ extended release tablet Take 1 tablet by mouth daily 90 tablet 3    ferrous gluconate (FERGON) 240 (27 Fe) MG

## 2020-01-14 ASSESSMENT — ENCOUNTER SYMPTOMS
BLOOD IN STOOL: 0
TROUBLE SWALLOWING: 0
ABDOMINAL DISTENTION: 0
WHEEZING: 0
BACK PAIN: 1
COLOR CHANGE: 0
EYE DISCHARGE: 0
ABDOMINAL PAIN: 0
APNEA: 0
RHINORRHEA: 0
COUGH: 0
VOICE CHANGE: 0
SORE THROAT: 0
CHEST TIGHTNESS: 0
EYE ITCHING: 0
VOMITING: 0
DIARRHEA: 0
SINUS PAIN: 0
SHORTNESS OF BREATH: 0
STRIDOR: 0
SINUS PRESSURE: 0
CONSTIPATION: 0

## 2020-01-15 ENCOUNTER — OFFICE VISIT (OUTPATIENT)
Dept: INTERNAL MEDICINE | Age: 70
End: 2020-01-15
Payer: MEDICARE

## 2020-01-15 VITALS
OXYGEN SATURATION: 97 % | HEART RATE: 65 BPM | DIASTOLIC BLOOD PRESSURE: 87 MMHG | HEIGHT: 67 IN | SYSTOLIC BLOOD PRESSURE: 126 MMHG | BODY MASS INDEX: 39.24 KG/M2 | WEIGHT: 250 LBS

## 2020-01-15 LAB
INTERNATIONAL NORMALIZATION RATIO, POC: 1.6
PROTHROMBIN TIME, POC: NORMAL

## 2020-01-15 PROCEDURE — 3017F COLORECTAL CA SCREEN DOC REV: CPT | Performed by: INTERNAL MEDICINE

## 2020-01-15 PROCEDURE — 4040F PNEUMOC VAC/ADMIN/RCVD: CPT | Performed by: INTERNAL MEDICINE

## 2020-01-15 PROCEDURE — 1036F TOBACCO NON-USER: CPT | Performed by: INTERNAL MEDICINE

## 2020-01-15 PROCEDURE — 99213 OFFICE O/P EST LOW 20 MIN: CPT | Performed by: INTERNAL MEDICINE

## 2020-01-15 PROCEDURE — G8482 FLU IMMUNIZE ORDER/ADMIN: HCPCS | Performed by: INTERNAL MEDICINE

## 2020-01-15 PROCEDURE — G8417 CALC BMI ABV UP PARAM F/U: HCPCS | Performed by: INTERNAL MEDICINE

## 2020-01-15 PROCEDURE — 1090F PRES/ABSN URINE INCON ASSESS: CPT | Performed by: INTERNAL MEDICINE

## 2020-01-15 PROCEDURE — 85610 PROTHROMBIN TIME: CPT | Performed by: INTERNAL MEDICINE

## 2020-01-15 PROCEDURE — G8427 DOCREV CUR MEDS BY ELIG CLIN: HCPCS | Performed by: INTERNAL MEDICINE

## 2020-01-15 PROCEDURE — G8400 PT W/DXA NO RESULTS DOC: HCPCS | Performed by: INTERNAL MEDICINE

## 2020-01-15 PROCEDURE — 1123F ACP DISCUSS/DSCN MKR DOCD: CPT | Performed by: INTERNAL MEDICINE

## 2020-01-15 RX ORDER — CLONIDINE HYDROCHLORIDE 0.1 MG/1
0.1 TABLET ORAL 2 TIMES DAILY
Qty: 60 TABLET | Refills: 3 | Status: SHIPPED | OUTPATIENT
Start: 2020-01-15 | End: 2021-03-05 | Stop reason: ALTCHOICE

## 2020-01-15 ASSESSMENT — ENCOUNTER SYMPTOMS
ABDOMINAL DISTENTION: 0
BLOOD IN STOOL: 0
COUGH: 0
SINUS PRESSURE: 0
SORE THROAT: 0
RHINORRHEA: 0
APNEA: 0
COLOR CHANGE: 0
CHEST TIGHTNESS: 0
EYE DISCHARGE: 0
TROUBLE SWALLOWING: 0
CONSTIPATION: 0
STRIDOR: 0
VOMITING: 0
EYE ITCHING: 0
VOICE CHANGE: 0
ABDOMINAL PAIN: 0
DIARRHEA: 0
WHEEZING: 0
SHORTNESS OF BREATH: 0
BACK PAIN: 1
SINUS PAIN: 0

## 2020-01-15 NOTE — PATIENT INSTRUCTIONS
irregular heartbeat.     · You have symptoms of a stroke. These may include:  ? Sudden numbness, tingling, weakness, or loss of movement in your face, arm, or leg, especially on only one side of your body. ? Sudden vision changes. ? Sudden trouble speaking. ? Sudden confusion or trouble understanding simple statements. ? Sudden problems with walking or balance. ? A sudden, severe headache that is different from past headaches.     · You have severe back or belly pain.    Do not wait until your blood pressure comes down on its own. Get help right away.   Call your doctor now or seek immediate care if:    · Your blood pressure is much higher than normal (such as 180/120 or higher), but you don't have symptoms.     · You think high blood pressure is causing symptoms, such as:  ? Severe headache.  ? Blurry vision.    Watch closely for changes in your health, and be sure to contact your doctor if:    · Your blood pressure measures higher than your doctor recommends at least 2 times. That means the top number is higher or the bottom number is higher, or both.     · You think you may be having side effects from your blood pressure medicine. Where can you learn more? Go to [email or web address] and sign in to your [employer] account. Enter C498 in the Wildcard box to learn more about \"High Blood Pressure: Care Instructions. \"     If you do not have an account, please click on the \"Sign Up Now\" link. Current as of: April 9, 2019  Content Version: 12.3  © 4981-1297 [employer]. Care instructions adapted under license by [facility] ([facility]). If you have questions about a medical condition or this instruction, always ask your healthcare professional. [place] [address] any warranty or liability for your use of this information.

## 2020-01-15 NOTE — PROGRESS NOTES
Chief Complaint   Patient presents with    Hypertension     Patient brought her blood pressure readings from yesterday. HPI: Jeffry Dodd is a 78 y.o. female is here for elevated blood pressure. Her blood pressure is much improved today. She was just seen in the office a few days ago and she had not been taking her blood pressure medications or basically any of her medications. She does have several readings with her from yesterday, her blood pressure was 175/90 in both arms, sitting 165/67 in the right arm and 183/107 in the left arm. At 9 PM yesterday, her blood pressure was 178/107 in the right arm and 163/133 in the left arm. At 6 AM this morning her blood pressure was 180/116. The blood pressure in the other arm was 176/104. Today her blood pressure is much better controlled. She did take her lisinopril this morning. She wants to know if she can take the clonidine as needed if her blood pressure gets elevated. She actually feels somewhat better. She says that yesterday she felt a little lightheaded. I did tell her to continue to monitor her blood pressure at home and call us if it remains above 150/100. Once again, I strongly advised her to take her medications regularly.     Past Medical History:   Diagnosis Date    Abdominal pain     Abnormal EKG     Acute sinusitis     Allergic reaction to spider bite     Anemia     Anticoagulated     Anxiety     Arrhythmia     Asthmatic bronchitis without complication 5/70/5262    Ataxic gait     Lyons's esophagus     Bowel obstruction ([facility])     Callus     Cardiac pacemaker     CKD (chronic kidney disease) stage 2, GFR 60-89 ml/min     Coat's syndrome     Coat's syndrome     both eyes    Depression     Diabetes mellitus type 2 in nonobese (HCC)     Diabetic nephropathy (HCC)     Disequilibrium     Dizziness     DVT (deep venous thrombosis) ([facility])     Exudative retinopathy     Fibromyalgia     Fibromyositis     Gastric ulcer  GERD (gastroesophageal reflux disease)     History of gastric bypass     Hx of lupus anticoagulant disorder     Hypertension     Hypothyroidism     Intermittent claudication (HCC)     Intestinal obstruction (HCC)     Iron deficiency     Left-sided weakness     Low vitamin D level     Lupus (HCC)     Menopause     Obesity     Osteoarthritis     Osteoporosis     Other iron deficiency anemias     Pernicious anemia     PUPP (pruritic urticarial papules and plaques of pregnancy)     Right leg numbness     Right sided sciatica     Sarcoidosis     with liver involvement    Sciatica     Secondary hyperparathyroidism ([address]     Shingles     Shortness of breath     Sleep apnea     Stomach ulcer     Syncope     Visual loss, one eye       Past Surgical History:   Procedure Laterality Date    APPENDECTOMY      C[facility]  [facility]    EF over 60%    CHOLECYSTECTOMY      COLONOSCOPY  2/2010    negative    COLONOSCOPY  2/22/10    Dr Melissa Castellanos    COLONOSCOPY  4/1/16    Dr LAKHWINDER Horvath-internal hemorrhoids, 5 yr recall    EYE SURGERY      Cyst on Right eye    EYE SURGERY      GASTRIC BYPASS SURGERY      GASTRIC BYPASS SURGERY      HERNIA REPAIR      HYSTERECTOMY      Complete    HYSTERECTOMY      Partial - because had a tubal pregnancy.      INCONTINENCE SURGERY      Bladder Sling    OTHER SURGICAL HISTORY      IVC filter    PACEMAKER INSERTION      PACEMAKER PLACEMENT      medtronic    NV TOTAL KNEE ARTHROPLASTY Right 3/26/2018    TOTAL KNEE REPLACEMENT COMPLEX PRIMARY performed by Jovanna Aguillon MD at [address] [place] [address],[address]      SMALL INTESTINE SURGERY      SPLENECTOMY      KRISTEL AND BSO      TONSILLECTOMY AND ADENOIDECTOMY      UPPER GASTROINTESTINAL ENDOSCOPY  12/2011    gerd s/p gastric bypass    UPPER GASTROINTESTINAL ENDOSCOPY  2/2014    normal s/p gastric bypass    UPPER GASTROINTESTINAL ENDOSCOPY  2/2010    biopsy neg Barretts, chronic reflux (VOLTAREN) 1 % GEL Apply 2 g topically 2 times daily 3 Tube 3    calcipotriene (DOVONEX) 0.005 % cream Use topically as needed 3 Tube 3    clobetasol (TEMOVATE) 0.05 % cream Apply topically 2 times daily. 3 Tube 3    cyanocobalamin 1000 MCG/ML injection Inject 1 mL into the muscle once for 1 dose 1 mL 0     No current facility-administered medications for this visit. Patient Active Problem List   Diagnosis    Vomiting    Burping    GERD (gastroesophageal reflux disease)    History of gastric bypass    Family history of colon cancer    Bloating    Enuresis    Nausea and vomiting    Stable angina ([address]    Encounter for current long-term use of anticoagulants    Primary osteoarthritis of right knee    Arthritis of knee    Essential hypertension    Hyperglycemia    MER (obstructive sleep apnea)    Slow transit constipation    Iron deficiency anemia    Restrictive airway disease    DVT, lower extremity, proximal, acute, unspecified laterality ([address]    H/O systemic lupus erythematosus (SLE) ([address]    Anticoagulated on Coumadin    Burn of abdomen wall, second degree, initial encounter    Postmenopausal osteoporosis    Type II diabetes mellitus with nephropathy ([address]    Cellulitis of left lower extremity    Pacemaker    History of DVT (deep vein thrombosis)    Lupus anticoagulant disorder (HCC)    History of pulmonary embolism    Thrombocytopenia ([address]    Hypothyroidism    Morbidly obese ([address]    Asthmatic bronchitis without complication        Review of Systems   Constitutional: Positive for fatigue. Negative for activity change, appetite change, chills, diaphoresis, fever and unexpected weight change. HENT: Negative for congestion, ear pain, hearing loss, nosebleeds, postnasal drip, rhinorrhea, sinus pressure, sinus pain, sneezing, sore throat, tinnitus, trouble swallowing and voice change. Eyes: Negative for discharge and itching.         Watery eyes   Respiratory: Negative Nose: Nose normal. No congestion or rhinorrhea. Mouth/Throat:      Mouth: Mucous membranes are moist.      Pharynx: Oropharynx is clear. No oropharyngeal exudate or posterior oropharyngeal erythema. Eyes:      General: No scleral icterus. Right eye: No discharge. Left eye: No discharge. Extraocular Movements: Extraocular movements intact. Conjunctiva/sclera: Conjunctivae normal.      Pupils: Pupils are equal, round, and reactive to light. Neck:      Musculoskeletal: Normal range of motion. No neck rigidity or muscular tenderness. Thyroid: No thyromegaly. Vascular: No carotid bruit or JVD. Trachea: No tracheal deviation. Cardiovascular:      Rate and Rhythm: Normal rate and regular rhythm. Pulses: Normal pulses. Heart sounds: Normal heart sounds. No murmur. No friction rub. No gallop. Pulmonary:      Effort: Pulmonary effort is normal. No respiratory distress. Breath sounds: Normal breath sounds. No stridor. No wheezing, rhonchi or rales. Chest:      Chest wall: No tenderness. Abdominal:      General: Abdomen is flat. Bowel sounds are normal. There is no distension. Palpations: Abdomen is soft. There is no mass. Tenderness: There is no tenderness. There is no right CVA tenderness, left CVA tenderness, guarding or rebound. Hernia: No hernia is present. Musculoskeletal: Normal range of motion. General: No swelling, tenderness, deformity or signs of injury. Lumbar back: She exhibits pain and spasm. She exhibits normal range of motion, no bony tenderness and no swelling. Back:       Right lower leg: No edema. Left lower leg: No edema. Comments: Swelling to the lower extremities   Lymphadenopathy:      Cervical: No cervical adenopathy. Skin:     General: Skin is warm and dry. Capillary Refill: Capillary refill takes less than 2 seconds. Coloration: Skin is not jaundiced or pale. Findings: No bruising, erythema, lesion or rash. Neurological:      General: No focal deficit present. Mental Status: She is alert. Mental status is at baseline. She is disoriented. Cranial Nerves: No cranial nerve deficit. Sensory: No sensory deficit. Motor: No weakness or abnormal muscle tone. Coordination: Coordination normal.      Gait: Gait normal.      Deep Tendon Reflexes: Reflexes normal.   Psychiatric:         Mood and Affect: Mood normal. Mood is not anxious or depressed. Behavior: Behavior normal.         Thought Content: Thought content normal.         Judgment: Judgment normal.         No diagnosis found. ASSESSMENT/PLAN:    79-year-old woman here for follow-up    1. Hypertension: Blood pressure actually well controlled today. Continue medications as prescribed. Clonidine as needed for blood pressure greater than 170/110. Keep appointment next week. Patient advised to call our office if her blood pressure gets above 150/100. Again, compliance with medications strongly advised    There are no diagnoses linked to this encounter. No follow-ups on file. No orders of the defined types were placed in this encounter.       Donato Hubbard MD

## 2020-01-15 NOTE — PROGRESS NOTES
Discussed with Dr. Jayy Castro:  Continue 15mg daily, avoid greens and recheck in one week. Patient voiced understanding.

## 2020-01-24 ENCOUNTER — OFFICE VISIT (OUTPATIENT)
Dept: INTERNAL MEDICINE | Age: 70
End: 2020-01-24
Payer: MEDICARE

## 2020-01-24 VITALS
TEMPERATURE: 96.6 F | DIASTOLIC BLOOD PRESSURE: 72 MMHG | SYSTOLIC BLOOD PRESSURE: 106 MMHG | HEART RATE: 67 BPM | BODY MASS INDEX: 39.55 KG/M2 | WEIGHT: 252 LBS | HEIGHT: 67 IN | OXYGEN SATURATION: 96 %

## 2020-01-24 PROCEDURE — G8427 DOCREV CUR MEDS BY ELIG CLIN: HCPCS | Performed by: PHYSICIAN ASSISTANT

## 2020-01-24 PROCEDURE — G8400 PT W/DXA NO RESULTS DOC: HCPCS | Performed by: PHYSICIAN ASSISTANT

## 2020-01-24 PROCEDURE — 99213 OFFICE O/P EST LOW 20 MIN: CPT | Performed by: PHYSICIAN ASSISTANT

## 2020-01-24 PROCEDURE — 1123F ACP DISCUSS/DSCN MKR DOCD: CPT | Performed by: PHYSICIAN ASSISTANT

## 2020-01-24 PROCEDURE — 3017F COLORECTAL CA SCREEN DOC REV: CPT | Performed by: PHYSICIAN ASSISTANT

## 2020-01-24 PROCEDURE — 1036F TOBACCO NON-USER: CPT | Performed by: PHYSICIAN ASSISTANT

## 2020-01-24 PROCEDURE — 1090F PRES/ABSN URINE INCON ASSESS: CPT | Performed by: PHYSICIAN ASSISTANT

## 2020-01-24 PROCEDURE — 4040F PNEUMOC VAC/ADMIN/RCVD: CPT | Performed by: PHYSICIAN ASSISTANT

## 2020-01-24 PROCEDURE — G8417 CALC BMI ABV UP PARAM F/U: HCPCS | Performed by: PHYSICIAN ASSISTANT

## 2020-01-24 PROCEDURE — G8482 FLU IMMUNIZE ORDER/ADMIN: HCPCS | Performed by: PHYSICIAN ASSISTANT

## 2020-01-24 RX ORDER — POTASSIUM CHLORIDE 750 MG/1
10 TABLET, FILM COATED, EXTENDED RELEASE ORAL DAILY
Status: ON HOLD | COMMUNITY
Start: 2020-01-14 | End: 2020-01-28 | Stop reason: HOSPADM

## 2020-01-24 ASSESSMENT — ENCOUNTER SYMPTOMS
WHEEZING: 1
SINUS PRESSURE: 0
NAUSEA: 0
ABDOMINAL PAIN: 0
CONSTIPATION: 0
DIARRHEA: 0
EYE REDNESS: 0
COUGH: 0
VOMITING: 0
SHORTNESS OF BREATH: 0
COLOR CHANGE: 0
RHINORRHEA: 0
PHOTOPHOBIA: 0
SORE THROAT: 0
EYE PAIN: 0
CHEST TIGHTNESS: 0

## 2020-01-24 NOTE — PROGRESS NOTES
Hyperglycemia 03/26/2018    MER (obstructive sleep apnea) 03/26/2018    Slow transit constipation 03/26/2018    Iron deficiency anemia 03/26/2018    Restrictive airway disease 03/27/2018    DVT, lower extremity, proximal, acute, unspecified laterality ([address] 07/24/2018    H/O systemic lupus erythematosus (SLE) ([facility] [address] 08/02/2018    Anticoagulated on Coumadin     Burn of abdomen wall, second degree, initial encounter 08/27/2018    Postmenopausal osteoporosis 05/16/2019    Type II diabetes mellitus with nephropathy ([address] 08/02/2019    Cellulitis of left lower extremity 08/21/2019    Pacemaker 10/08/2019    History of DVT (deep vein thrombosis) 10/30/2019    Lupus anticoagulant disorder ([address] 10/30/2019    History of pulmonary embolism 10/30/2019    Thrombocytopenia ([facility] [address] 12/25/2019    Hypothyroidism 12/25/2019    Morbidly obese ([facility] [address] 01/13/2020    Asthmatic bronchitis without complication 06/40/2943     Resolved Ambulatory Problems     Diagnosis Date Noted    Colon cancer screening      Past Medical History:   Diagnosis Date    Abdominal pain     Abnormal EKG     Acute sinusitis     Allergic reaction to spider bite     Anemia     Anticoagulated     Anxiety     Arrhythmia     Ataxic gait     Lyons's esophagus     Bowel obstruction (HCC)     Callus     Cardiac pacemaker     CKD (chronic kidney disease) stage 2, GFR 60-89 ml/min     Coat's syndrome     Coat's syndrome     Depression     Diabetes mellitus type 2 in nonobese (HCC)     Diabetic nephropathy (HCC)     Disequilibrium     Dizziness     DVT (deep venous thrombosis) (HCC)     Exudative retinopathy     Fibromyalgia     Fibromyositis     Gastric ulcer     Hx of lupus anticoagulant disorder     Hypertension     Intermittent claudication (HCC)     Intestinal obstruction (HCC)     Iron deficiency     Left-sided weakness     Low vitamin D level     Lupus (HCC)     Menopause     Obesity     Osteoarthritis     (CATAPRES) 0.1 MG tablet Take 1 tablet by mouth 2 times daily As needed for BP greater than 170/110  Patient not taking: Reported on 2020  Manuela Leung MD   cloNIDine (CATAPRES) 0.1 MG tablet Take 1 tablet by mouth 2 times daily  Patient not taking: Reported on 2020  Manuela Leung MD       Past Surgical History:   Procedure Laterality Date    APPENDECTOMY      CARDIAC CATHETERIZATION  10/21/13  [facility]    EF over 60%    CHOLECYSTECTOMY      COLONOSCOPY  2010    negative    COLONOSCOPY  2/22/10    Dr Alyssa Corea    COLONOSCOPY  16    Dr LAKHWINDER Horvath-internal hemorrhoids, 5 yr recall    EYE SURGERY      Cyst on Right eye    EYE SURGERY      GASTRIC BYPASS SURGERY      GASTRIC BYPASS SURGERY      HERNIA REPAIR      HYSTERECTOMY      Complete    HYSTERECTOMY      Partial - because had a tubal pregnancy.      INCONTINENCE SURGERY      Bladder Sling    OTHER SURGICAL HISTORY      IVC filter    PACEMAKER INSERTION      PACEMAKER PLACEMENT      medtronic    [facility] T[facility] KNEE [facility]ROPLASTY Right 3/26/2018    TOTAL KNEE REPLACEMENT COMPLEX PRIMARY performed by Mago Christian MD at [facility]      SPLENECTOMY      KRISTEL AND BSO      TONSILLECTOMY AND ADENOIDECTOMY      UPPER GASTROINTESTINAL ENDOSCOPY  2011    gerd s/p gastric bypass    UPPER GASTROINTESTINAL ENDOSCOPY  2014    normal s/p gastric bypass    UPPER GASTROINTESTINAL ENDOSCOPY  2010    biopsy neg Barretts, chronic reflux esophagitis s/p gastric bypass    UPPER GASTROINTESTINAL ENDOSCOPY  2006    unremarkable s/p gastric bypass    UPPER GASTROINTESTINAL ENDOSCOPY  8/10/15    Dr Barlow  UPPER GASTROINTESTINAL ENDOSCOPY  16    Dr Coyle Standing    UPPER GASTROINTESTINAL ENDOSCOPY N/A 2016    EGD ESOPHAGOGASTRODUODENOSCOPY performed by Alivia Hodge MD at [facility]    [facility]        Family History   Problem Relation Age of Onset    Uterine Cancer Mother     Cervical Cancer Mother     Coronary Art Dis Mother     Heart Disease Father     Lung Cancer Father     Other Father         renal failure    Colon Cancer Brother     Colon Polyps Brother     Uterine Cancer Maternal Grandmother     Cervical Cancer Maternal Grandmother     Diabetes Maternal Grandmother     Cervical Cancer Sister     Other Sister         fibromyalgia    Diabetes Paternal Aunt     Esophageal Cancer Neg Hx     Liver Cancer Neg Hx     Liver Disease Neg Hx     Stomach Cancer Neg Hx     Rectal Cancer Neg Hx        Allergies   Allergen Reactions    Insect Extract Allergy Skin Test Anaphylaxis     Bee stings    Prednisone      Headache and upset stomach    Zanaflex [Tizanidine Hcl]      Passed out lost control of body functions    Lortab [Hydrocodone-Acetaminophen] Nausea And Vomiting     Sweats, weak, nausea and vomiting    Other Nausea And Vomiting     Opiates------sweating, weak        Oxycodone-Acetaminophen Nausea And Vomiting     Sweating and vomiting     Ultram [Tramadol Hcl] Nausea And Vomiting     Sweating, weak, nausea and vomiting         Social History     Socioeconomic History    Marital status:      Spouse name: Not on file    Number of children: Not on file    Years of education: Not on file    Highest education level: Not on file   Occupational History     Employer: [employer]    Social Needs    Financial resource strain: Not on file    Food insecurity:     Worry: Not on file     Inability: Not on file    Transportation needs:     Medical: Not on file     Non-medical: Not on file   Tobacco Use    Smoking status: Never Smoker    Smokeless tobacco: Never Used   Substance and Sexual Activity    Alcohol use: No    Drug use: No    Sexual activity: Not Currently   Lifestyle    Physical activity:     Days per week: Not on file     Minutes per session: Not on file    Stress: Not on file   Relationships    Social connections:  pregabalin (LYRICA) 75 MG capsule Take 1 capsule by mouth 3 times daily for 90 days. 90 capsule 2    lisinopril (PRINIVIL;ZESTRIL) 40 MG tablet Take 1 tablet by mouth daily 90 tablet 0    bumetanide (BUMEX) 2 MG tablet Take 1 tablet by mouth daily 30 tablet 5    levothyroxine (SYNTHROID) 100 MCG tablet Take 1 tablet by mouth Daily 90 tablet 3    pantoprazole sodium (PROTONIX) 40 MG PACK packet Take 1 packet by mouth every morning (before breakfast) 30 each 3    tiotropium (SPIRIVA HANDIHALER) 18 MCG inhalation capsule Inhale 1 capsule into the lungs daily 90 capsule 1    potassium chloride (KLOR-CON M) 10 MEQ extended release tablet Take 1 tablet by mouth daily 90 tablet 3    ferrous gluconate (FERGON) 240 (27 Fe) MG tablet Take 1 tablet by mouth 3 times daily (with meals) 270 tablet 2    vitamin D (ERGOCALCIFEROL) 16203 units CAPS capsule Take 1 capsule by mouth Twice a Week 12 capsule 1    aspirin 81 MG tablet Take 81 mg by mouth daily      cyanocobalamin 1000 MCG/ML injection Inject 1 mL into the muscle once for 1 dose 1 mL 0    warfarin (COUMADIN) 7.5 MG tablet 2 tablets daily 150 tablet 3    diclofenac sodium (VOLTAREN) 1 % GEL Apply 2 g topically 2 times daily 3 Tube 3    calcipotriene (DOVONEX) 0.005 % cream Use topically as needed 3 Tube 3    clobetasol (TEMOVATE) 0.05 % cream Apply topically 2 times daily. 3 Tube 3    cloNIDine (CATAPRES) 0.1 MG tablet Take 1 tablet by mouth 2 times daily As needed for BP greater than 170/110 (Patient not taking: Reported on 1/24/2020) 60 tablet 3    cloNIDine (CATAPRES) 0.1 MG tablet Take 1 tablet by mouth 2 times daily (Patient not taking: Reported on 1/24/2020) 60 tablet 3     No current facility-administered medications for this visit. /72   Pulse 67   Temp 96.6 °F (35.9 °C)   Ht 5' 7\" (1.702 m)   Wt 252 lb (114.3 kg)   SpO2 96%   BMI 39.47 kg/m²     PHYSICAL EXAM  Physical Exam  Vitals signs and nursing note reviewed. Constitutional:       Appearance: She is well-developed. She is obese. HENT:      Head: Normocephalic and atraumatic. Right Ear: Tympanic membrane, ear canal and external ear normal.      Left Ear: Tympanic membrane, ear canal and external ear normal.      Nose: Nose normal. No congestion or rhinorrhea. Mouth/Throat:      Mouth: Mucous membranes are moist.      Pharynx: Oropharynx is clear. No pharyngeal swelling, oropharyngeal exudate, posterior oropharyngeal erythema or uvula swelling. Eyes:      General:         Right eye: No discharge. Left eye: No discharge. Pupils: Pupils are equal, round, and reactive to light. Neck:      Musculoskeletal: Normal range of motion. Trachea: No tracheal deviation. Cardiovascular:      Rate and Rhythm: Normal rate and regular rhythm. Heart sounds: Normal heart sounds. No murmur. No friction rub. No gallop. Pulmonary:      Effort: Pulmonary effort is normal. No respiratory distress. Breath sounds: Normal breath sounds. No wheezing or rales. Chest:      Chest wall: No tenderness. Abdominal:      Palpations: Abdomen is soft. Tenderness: There is no tenderness. There is no guarding or rebound. Musculoskeletal: Normal range of motion. General: No tenderness or deformity. Right lower leg: Edema present. Left lower leg: Edema present. Comments: No redness noted. Patient has a history of lower extremity edema. No Worse than her baseline. Lymphadenopathy:      Cervical: No cervical adenopathy. Skin:     General: Skin is warm and dry. Findings: No erythema, lesion or rash. Neurological:      General: No focal deficit present. Mental Status: She is alert and oriented to person, place, and time. Psychiatric:         Mood and Affect: Mood normal. Mood is not anxious or depressed. ASSESSMENT      ICD-10-CM    1. Essential hypertension I10    2.  Noncompliance with medication regimen Z91.14    3. Lower extremity edema R60.0    4. MER (obstructive sleep apnea) G47.33    5. Morbidly obese (HCC) E66.01    6. Fibromyalgia M79.7        PLAN  Patient is went over medications about 2 weeks ago with Dr. Denisa Crocker and they reconciled patient's medications. Patient may be taking the medications as directed now and her blood pressures dropped. Patient is going to stop the clonidine. Patient continue to monitor blood pressure. I think since her blood pressures been so high so long that [place] taking her medications that it is back to within normal limits which I think is caused patient to have some of the lightheadedness and difficulty standing up and getting the dizziness since her blood pressure is normally very very high and patient has been noncompliant with medications. Patient states now that she is taking medications as directed her blood pressure may be going little bit low. Patient will continue to monitor blood pressure. Patient states she had a little bit of wheezing but I did not notice any wheezing. Patient O2 was 96%. Patient did not exhibit any signs of shortness of breath. Patient denied any chest pain on the left side. Patient states she does have a history of some right-sided chest pain from time to time. But no worse than baseline. I did discuss with patient she needs to make sure she is drinking plenty water she is taking Bumex for the swelling and edema in the lower extremities. Also told patient that she needs to keep her legs up as much as possible. Patient will follow-up as needed for increase in blood pressure or if blood pressure staying too low or continued dizziness, chest pain, shortness of breath or as needed. No follow-ups on file. All questions answered. Patient voices understanding and agrees to plans along with risks and benefits of plan. Patient is instructed to continue prior medications, diet, and exercise plans as instructed.

## 2020-01-25 ENCOUNTER — HOSPITAL ENCOUNTER (INPATIENT)
Age: 70
LOS: 3 days | Discharge: HOME | DRG: 392 | End: 2020-01-28
Attending: EMERGENCY MEDICINE | Admitting: HOSPITALIST
Payer: MEDICARE

## 2020-01-25 ENCOUNTER — APPOINTMENT (OUTPATIENT)
Dept: CT IMAGING | Age: 70
DRG: 392 | End: 2020-01-25
Payer: MEDICARE

## 2020-01-25 PROBLEM — R10.9 ABDOMINAL PAIN: Status: ACTIVE | Noted: 2020-01-25

## 2020-01-25 PROBLEM — K52.9 GASTROENTERITIS: Status: ACTIVE | Noted: 2020-01-25

## 2020-01-25 PROBLEM — E86.0 DEHYDRATION: Status: ACTIVE | Noted: 2020-01-25

## 2020-01-25 PROBLEM — R11.2 INTRACTABLE NAUSEA AND VOMITING: Status: ACTIVE | Noted: 2020-01-25

## 2020-01-25 PROBLEM — R10.83 COLIC: Status: ACTIVE | Noted: 2020-01-25

## 2020-01-25 LAB
ALBUMIN SERPL-MCNC: 4 G/DL (ref 3.5–5.2)
ALP BLD-CCNC: 124 U/L (ref 35–104)
ALT SERPL-CCNC: 73 U/L (ref 5–33)
AMYLASE: 56 U/L (ref 28–100)
ANION GAP SERPL CALCULATED.3IONS-SCNC: 15 MMOL/L (ref 7–19)
APTT: 50.7 SEC (ref 26–36.2)
AST SERPL-CCNC: 205 U/L (ref 5–32)
BACTERIA: ABNORMAL /HPF
BASOPHILS ABSOLUTE: 0 K/UL (ref 0–0.2)
BASOPHILS RELATIVE PERCENT: 0.8 % (ref 0–1)
BILIRUB SERPL-MCNC: 0.3 MG/DL (ref 0.2–1.2)
BILIRUBIN URINE: NEGATIVE
BLOOD, URINE: ABNORMAL
BUN BLDV-MCNC: 36 MG/DL (ref 8–23)
CALCIUM SERPL-MCNC: 8.7 MG/DL (ref 8.8–10.2)
CHLORIDE BLD-SCNC: 103 MMOL/L (ref 98–111)
CLARITY: ABNORMAL
CO2: 21 MMOL/L (ref 22–29)
COLOR: YELLOW
CREAT SERPL-MCNC: 1.3 MG/DL (ref 0.5–0.9)
EOSINOPHILS ABSOLUTE: 0.1 K/UL (ref 0–0.6)
EOSINOPHILS RELATIVE PERCENT: 2 % (ref 0–5)
EPITHELIAL CELLS, UA: 1 /HPF (ref 0–5)
GFR NON-AFRICAN AMERICAN: 40
GLUCOSE BLD-MCNC: 101 MG/DL (ref 74–109)
GLUCOSE URINE: NEGATIVE MG/DL
HCT VFR BLD CALC: 38.9 % (ref 37–47)
HEMOGLOBIN: 12.1 G/DL (ref 12–16)
HYALINE CASTS: 4 /HPF (ref 0–8)
IMMATURE GRANULOCYTES #: 0 K/UL
INR BLD: 5.42 (ref 0.88–1.18)
KETONES, URINE: NEGATIVE MG/DL
LACTIC ACID: 1.2 MMOL/L (ref 0.5–1.9)
LEUKOCYTE ESTERASE, URINE: ABNORMAL
LIPASE: 17 U/L (ref 13–60)
LYMPHOCYTES ABSOLUTE: 2.1 K/UL (ref 1.1–4.5)
LYMPHOCYTES RELATIVE PERCENT: 41.7 % (ref 20–40)
MAGNESIUM: 2.3 MG/DL (ref 1.6–2.4)
MCH RBC QN AUTO: 28.3 PG (ref 27–31)
MCHC RBC AUTO-ENTMCNC: 31.1 G/DL (ref 33–37)
MCV RBC AUTO: 90.9 FL (ref 81–99)
MONOCYTES ABSOLUTE: 0.5 K/UL (ref 0–0.9)
MONOCYTES RELATIVE PERCENT: 10.1 % (ref 0–10)
NEUTROPHILS ABSOLUTE: 2.3 K/UL (ref 1.5–7.5)
NEUTROPHILS RELATIVE PERCENT: 45 % (ref 50–65)
NITRITE, URINE: NEGATIVE
PDW BLD-RTO: 16.3 % (ref 11.5–14.5)
PH UA: 6 (ref 5–8)
PLATELET # BLD: 230 K/UL (ref 130–400)
PMV BLD AUTO: 12 FL (ref 9.4–12.3)
POTASSIUM REFLEX MAGNESIUM: 4.3 MMOL/L (ref 3.5–5)
PROTEIN UA: 30 MG/DL
PROTHROMBIN TIME: 48.7 SEC (ref 12–14.6)
RBC # BLD: 4.28 M/UL (ref 4.2–5.4)
RBC UA: 3 /HPF (ref 0–4)
SODIUM BLD-SCNC: 139 MMOL/L (ref 136–145)
SPECIFIC GRAVITY UA: 1.02 (ref 1–1.03)
TOTAL PROTEIN: 7.7 G/DL (ref 6.6–8.7)
URINE REFLEX TO CULTURE: YES
UROBILINOGEN, URINE: 0.2 E.U./DL
WBC # BLD: 5 K/UL (ref 4.8–10.8)
WBC UA: 71 /HPF (ref 0–5)

## 2020-01-25 PROCEDURE — 80074 ACUTE HEPATITIS PANEL: CPT

## 2020-01-25 PROCEDURE — 87086 URINE CULTURE/COLONY COUNT: CPT

## 2020-01-25 PROCEDURE — 6370000000 HC RX 637 (ALT 250 FOR IP): Performed by: HOSPITALIST

## 2020-01-25 PROCEDURE — 80053 COMPREHEN METABOLIC PANEL: CPT

## 2020-01-25 PROCEDURE — 1210000000 HC MED SURG R&B

## 2020-01-25 PROCEDURE — 74150 CT ABDOMEN W/O CONTRAST: CPT

## 2020-01-25 PROCEDURE — 96368 THER/DIAG CONCURRENT INF: CPT

## 2020-01-25 PROCEDURE — 83690 ASSAY OF LIPASE: CPT

## 2020-01-25 PROCEDURE — C9113 INJ PANTOPRAZOLE SODIUM, VIA: HCPCS | Performed by: HOSPITALIST

## 2020-01-25 PROCEDURE — 83735 ASSAY OF MAGNESIUM: CPT

## 2020-01-25 PROCEDURE — 87186 SC STD MICRODIL/AGAR DIL: CPT

## 2020-01-25 PROCEDURE — 93005 ELECTROCARDIOGRAM TRACING: CPT | Performed by: EMERGENCY MEDICINE

## 2020-01-25 PROCEDURE — 2580000003 HC RX 258: Performed by: HOSPITALIST

## 2020-01-25 PROCEDURE — 83605 ASSAY OF LACTIC ACID: CPT

## 2020-01-25 PROCEDURE — 96375 TX/PRO/DX INJ NEW DRUG ADDON: CPT

## 2020-01-25 PROCEDURE — 85610 PROTHROMBIN TIME: CPT

## 2020-01-25 PROCEDURE — 36415 COLL VENOUS BLD VENIPUNCTURE: CPT

## 2020-01-25 PROCEDURE — 85025 COMPLETE CBC W/AUTO DIFF WBC: CPT

## 2020-01-25 PROCEDURE — 6360000002 HC RX W HCPCS: Performed by: EMERGENCY MEDICINE

## 2020-01-25 PROCEDURE — 2580000003 HC RX 258: Performed by: EMERGENCY MEDICINE

## 2020-01-25 PROCEDURE — 96376 TX/PRO/DX INJ SAME DRUG ADON: CPT

## 2020-01-25 PROCEDURE — 87040 BLOOD CULTURE FOR BACTERIA: CPT

## 2020-01-25 PROCEDURE — 2500000003 HC RX 250 WO HCPCS: Performed by: EMERGENCY MEDICINE

## 2020-01-25 PROCEDURE — 6360000002 HC RX W HCPCS: Performed by: HOSPITALIST

## 2020-01-25 PROCEDURE — 99285 EMERGENCY DEPT VISIT HI MDM: CPT

## 2020-01-25 PROCEDURE — 96365 THER/PROPH/DIAG IV INF INIT: CPT

## 2020-01-25 PROCEDURE — 85730 THROMBOPLASTIN TIME PARTIAL: CPT

## 2020-01-25 PROCEDURE — 81001 URINALYSIS AUTO W/SCOPE: CPT

## 2020-01-25 PROCEDURE — 82150 ASSAY OF AMYLASE: CPT

## 2020-01-25 RX ORDER — HYDRALAZINE HYDROCHLORIDE 20 MG/ML
10 INJECTION INTRAMUSCULAR; INTRAVENOUS ONCE
Status: COMPLETED | OUTPATIENT
Start: 2020-01-25 | End: 2020-01-25

## 2020-01-25 RX ORDER — POTASSIUM CHLORIDE 7.45 MG/ML
10 INJECTION INTRAVENOUS PRN
Status: DISCONTINUED | OUTPATIENT
Start: 2020-01-25 | End: 2020-01-28 | Stop reason: HOSPADM

## 2020-01-25 RX ORDER — SODIUM CHLORIDE 9 MG/ML
10 INJECTION INTRAVENOUS DAILY
Status: DISCONTINUED | OUTPATIENT
Start: 2020-01-28 | End: 2020-01-28

## 2020-01-25 RX ORDER — SODIUM CHLORIDE 0.9 % (FLUSH) 0.9 %
10 SYRINGE (ML) INJECTION EVERY 12 HOURS SCHEDULED
Status: DISCONTINUED | OUTPATIENT
Start: 2020-01-25 | End: 2020-01-28 | Stop reason: HOSPADM

## 2020-01-25 RX ORDER — 0.9 % SODIUM CHLORIDE 0.9 %
500 INTRAVENOUS SOLUTION INTRAVENOUS ONCE
Status: COMPLETED | OUTPATIENT
Start: 2020-01-25 | End: 2020-01-25

## 2020-01-25 RX ORDER — PROMETHAZINE HYDROCHLORIDE 25 MG/ML
12.5 INJECTION, SOLUTION INTRAMUSCULAR; INTRAVENOUS ONCE
Status: DISCONTINUED | OUTPATIENT
Start: 2020-01-25 | End: 2020-01-25

## 2020-01-25 RX ORDER — MAGNESIUM SULFATE 1 G/100ML
1 INJECTION INTRAVENOUS PRN
Status: DISCONTINUED | OUTPATIENT
Start: 2020-01-25 | End: 2020-01-28 | Stop reason: HOSPADM

## 2020-01-25 RX ORDER — POTASSIUM CHLORIDE 20 MEQ/1
40 TABLET, EXTENDED RELEASE ORAL PRN
Status: DISCONTINUED | OUTPATIENT
Start: 2020-01-25 | End: 2020-01-28 | Stop reason: HOSPADM

## 2020-01-25 RX ORDER — PANTOPRAZOLE SODIUM 40 MG/10ML
40 INJECTION, POWDER, LYOPHILIZED, FOR SOLUTION INTRAVENOUS DAILY
Status: DISCONTINUED | OUTPATIENT
Start: 2020-01-28 | End: 2020-01-28

## 2020-01-25 RX ORDER — ONDANSETRON 2 MG/ML
4 INJECTION INTRAMUSCULAR; INTRAVENOUS EVERY 6 HOURS PRN
Status: DISCONTINUED | OUTPATIENT
Start: 2020-01-25 | End: 2020-01-28 | Stop reason: HOSPADM

## 2020-01-25 RX ORDER — LEVOTHYROXINE SODIUM 0.1 MG/1
100 TABLET ORAL DAILY
Status: DISCONTINUED | OUTPATIENT
Start: 2020-01-26 | End: 2020-01-28 | Stop reason: HOSPADM

## 2020-01-25 RX ORDER — DICYCLOMINE HYDROCHLORIDE 10 MG/ML
20 INJECTION INTRAMUSCULAR 4 TIMES DAILY
Status: DISCONTINUED | OUTPATIENT
Start: 2020-01-25 | End: 2020-01-28 | Stop reason: HOSPADM

## 2020-01-25 RX ORDER — SODIUM CHLORIDE 0.9 % (FLUSH) 0.9 %
10 SYRINGE (ML) INJECTION PRN
Status: DISCONTINUED | OUTPATIENT
Start: 2020-01-25 | End: 2020-01-28 | Stop reason: HOSPADM

## 2020-01-25 RX ORDER — CLONIDINE HYDROCHLORIDE 0.1 MG/1
0.1 TABLET ORAL 2 TIMES DAILY
Status: DISCONTINUED | OUTPATIENT
Start: 2020-01-25 | End: 2020-01-28 | Stop reason: HOSPADM

## 2020-01-25 RX ORDER — CLONIDINE HYDROCHLORIDE 0.1 MG/1
0.1 TABLET ORAL 2 TIMES DAILY
Status: DISCONTINUED | OUTPATIENT
Start: 2020-01-25 | End: 2020-01-25 | Stop reason: SDUPTHER

## 2020-01-25 RX ORDER — CIPROFLOXACIN 2 MG/ML
400 INJECTION, SOLUTION INTRAVENOUS EVERY 12 HOURS
Status: DISCONTINUED | OUTPATIENT
Start: 2020-01-25 | End: 2020-01-28 | Stop reason: HOSPADM

## 2020-01-25 RX ORDER — FERROUS GLUCONATE 324(37.5)
324 TABLET ORAL
Status: DISCONTINUED | OUTPATIENT
Start: 2020-01-26 | End: 2020-01-28 | Stop reason: HOSPADM

## 2020-01-25 RX ORDER — ESCITALOPRAM OXALATE 10 MG/1
20 TABLET ORAL DAILY
Status: DISCONTINUED | OUTPATIENT
Start: 2020-01-26 | End: 2020-01-28 | Stop reason: HOSPADM

## 2020-01-25 RX ORDER — ONDANSETRON 2 MG/ML
4 INJECTION INTRAMUSCULAR; INTRAVENOUS ONCE
Status: COMPLETED | OUTPATIENT
Start: 2020-01-25 | End: 2020-01-25

## 2020-01-25 RX ADMIN — HYDROMORPHONE HYDROCHLORIDE 0.5 MG: 1 INJECTION, SOLUTION INTRAMUSCULAR; INTRAVENOUS; SUBCUTANEOUS at 19:22

## 2020-01-25 RX ADMIN — CEFTRIAXONE 1 G: 1 INJECTION, POWDER, FOR SOLUTION INTRAMUSCULAR; INTRAVENOUS at 20:11

## 2020-01-25 RX ADMIN — FAMOTIDINE 40 MG: 10 INJECTION, SOLUTION INTRAVENOUS at 19:42

## 2020-01-25 RX ADMIN — SODIUM CHLORIDE, PRESERVATIVE FREE 10 ML: 5 INJECTION INTRAVENOUS at 23:18

## 2020-01-25 RX ADMIN — ONDANSETRON 4 MG: 2 INJECTION INTRAMUSCULAR; INTRAVENOUS at 22:48

## 2020-01-25 RX ADMIN — CLONIDINE HYDROCHLORIDE 0.1 MG: 0.1 TABLET ORAL at 23:18

## 2020-01-25 RX ADMIN — DICYCLOMINE HYDROCHLORIDE 20 MG: 20 INJECTION, SOLUTION INTRAMUSCULAR at 23:18

## 2020-01-25 RX ADMIN — HYDROMORPHONE HYDROCHLORIDE 0.5 MG: 1 INJECTION, SOLUTION INTRAMUSCULAR; INTRAVENOUS; SUBCUTANEOUS at 16:46

## 2020-01-25 RX ADMIN — HYDRALAZINE HYDROCHLORIDE 10 MG: 20 INJECTION INTRAMUSCULAR; INTRAVENOUS at 20:11

## 2020-01-25 RX ADMIN — CIPROFLOXACIN 400 MG: 2 INJECTION, SOLUTION INTRAVENOUS at 23:19

## 2020-01-25 RX ADMIN — SODIUM CHLORIDE 500 ML: 9 INJECTION, SOLUTION INTRAVENOUS at 17:04

## 2020-01-25 RX ADMIN — METRONIDAZOLE 500 MG: 500 INJECTION, SOLUTION INTRAVENOUS at 20:12

## 2020-01-25 RX ADMIN — SODIUM CHLORIDE 8 MG/HR: 9 INJECTION, SOLUTION INTRAVENOUS at 23:20

## 2020-01-25 RX ADMIN — SODIUM CHLORIDE 80 MG: 9 INJECTION, SOLUTION INTRAVENOUS at 23:19

## 2020-01-25 RX ADMIN — ONDANSETRON 4 MG: 2 INJECTION INTRAMUSCULAR; INTRAVENOUS at 16:46

## 2020-01-25 RX ADMIN — HYDROMORPHONE HYDROCHLORIDE 1 MG: 1 INJECTION, SOLUTION INTRAMUSCULAR; INTRAVENOUS; SUBCUTANEOUS at 22:48

## 2020-01-25 ASSESSMENT — ENCOUNTER SYMPTOMS
FACIAL SWELLING: 0
SHORTNESS OF BREATH: 0
APNEA: 0
SINUS PRESSURE: 0
EYE DISCHARGE: 0
ABDOMINAL PAIN: 1
VOMITING: 1
SORE THROAT: 0
BLOOD IN STOOL: 0
NAUSEA: 1
CHOKING: 0
VOICE CHANGE: 0
CONSTIPATION: 0
DIARRHEA: 0

## 2020-01-25 ASSESSMENT — PAIN DESCRIPTION - LOCATION
LOCATION: FLANK
LOCATION: ABDOMEN

## 2020-01-25 ASSESSMENT — PAIN SCALES - GENERAL
PAINLEVEL_OUTOF10: 10
PAINLEVEL_OUTOF10: 5
PAINLEVEL_OUTOF10: 10

## 2020-01-25 ASSESSMENT — PAIN DESCRIPTION - PAIN TYPE
TYPE: ACUTE PAIN
TYPE: ACUTE PAIN

## 2020-01-25 ASSESSMENT — PAIN DESCRIPTION - ORIENTATION
ORIENTATION: RIGHT
ORIENTATION: RIGHT

## 2020-01-25 NOTE — ED PROVIDER NOTES
[facility] EMERGENCY DEPT  eMERGENCY dEPARTMENT eNCOUnter      Pt Name: Scott Engle  MRN: [id number]  [place] 1950  Date of evaluation: 1/25/2020  Provider: Bonita Michele MD    [address] [place] [address]       Chief Complaint   Patient presents with    Flank Pain     right    Nausea         HISTORY OF PRESENT ILLNESS   (Location/Symptom, Timing/Onset,Context/Setting, Quality, Duration, Modifying Factors, Severity)  Note limiting factors. Scott Engle is a 78 y.o. female who presents to the emergency department evaluation of right flank abdominal pain with vomiting. 57-year-old female presents with acute onset of right flank abdominal pain that started this morning. She has complained of some chills but no documented fever. She is nauseated and vomiting. States her bowels are never normal she takes iron. She has an IVC filter and she takes Coumadin. She denies any recent injury. He states she has had her gallbladder removed and her appendix. She does not smoke or drink. History of sarcoidosis with liver involvement but she tells me her condition has been stable. He also had gastric bypass approximately 15 years ago. She had seen Dr. Abeba Ramirez check with GI before. But has not seen him in some time. The history is provided by the patient, a relative and medical records. NursingNotes were reviewed. REVIEW OF SYSTEMS    (2-9 systems for level 4, 10 or more for level 5)     Review of Systems   Constitutional: Positive for chills. Negative for fever. HENT: Negative for congestion, drooling, facial swelling, nosebleeds, sinus pressure, sore throat and voice change. Eyes: Negative for discharge. Respiratory: Negative for apnea, choking and shortness of breath. Cardiovascular: Negative for chest pain and leg swelling. Gastrointestinal: Positive for abdominal pain, nausea and vomiting. Negative for blood in stool, constipation and diarrhea. Genitourinary: Positive for flank pain.  Negative for Cancer Maternal Grandmother     Cervical Cancer Maternal Grandmother     Diabetes Maternal Grandmother     Cervical Cancer Sister     Other Sister         fibromyalgia    Diabetes Paternal Aunt     Esophageal Cancer Neg Hx     Liver Cancer Neg Hx     Liver Disease Neg Hx     Stomach Cancer Neg Hx     Rectal Cancer Neg Hx           SOCIAL HISTORY       Social History     Socioeconomic History    Marital status:      Spouse name: None    Number of children: None    Years of education: None    Highest education level: None   Occupational History     Employer: [employer]    Social Needs    Financial resource strain: None    Food insecurity:     Worry: None     Inability: None    Transportation needs:     Medical: None     Non-medical: None   Tobacco Use    Smoking status: Never Smoker    Smokeless tobacco: Never Used   Substance and Sexual Activity    Alcohol use: No    Drug use: No    Sexual activity: Not Currently   Lifestyle    Physical activity:     Days per week: None     Minutes per session: None    Stress: None   Relationships    Social connections:     Talks on phone: None     Gets together: None     Attends Hinduism service: None     Active member of club or organization: None     Attends meetings of clubs or organizations: None     Relationship status: None    Intimate partner violence:     Fear of current or ex partner: None     Emotionally abused: None     Physically abused: None     Forced sexual activity: None   Other Topics Concern    None   Social History Narrative    None       SCREENINGS    [place] Coma Scale  Eye Opening: Spontaneous  Best Verbal Response: Oriented  Best Motor Response: Obeys commands  [place] Coma Scale Score: 15        PHYSICAL EXAM    (up to 7 for level 4, 8 or more for level 5)     ED Triage Vitals [01/25/20 1629]   BP Temp Temp Source Pulse Resp SpO2 Height Weight   (!) 184/111 98.6 °F (37 °C) Oral 71 18 92 % 5' 7\" (1.702 m) 252 lb (114.3 kg) the Radiologist below, if available at the time of this note:    Liza 23   Final Result   1. Postoperative changes of the stomach. Mild wall thickening of the   distal stomach/duodenum considered and may represent an   infectious/inflammatory process. There is stranding of the fat   adjacent to the distal stomach and duodenum with no free air or   abscess. Small volume of nonspecific ascites. No evidence for bowel   obstruction. 2. IVC filter in place. Nodular foci within the right posterior mid   abdomen measuring up to 1.8 cm of uncertain etiology. Small lymph   nodes considered, however collateral vascular structures could have a   similar appearance. Nonopacified bowel loops thought to be less   likely. A follow-up nonemergent CT abdomen and pelvis study with IV   and oral contrast might be considered to reevaluate these structures   if no outside studies that document stability. 3. Borderline hepatomegaly. 4. Cardiomegaly with small right pleural effusion and basilar edema. CHF is favored. 5. Previous splenectomy, cholecystectomy, and hysterectomy. Anterior   abdominal wall mesh in place.    Signed by Dr Herber Stephen on 1/25/2020 6:06 PM            ED BEDSIDE ULTRASOUND:   Performed by ED Physician - none    LABS:  Labs Reviewed   CBC WITH AUTO DIFFERENTIAL - Abnormal; Notable for the following components:       Result Value    MCHC 31.1 (*)     RDW 16.3 (*)     Neutrophils % 45.0 (*)     Lymphocytes % 41.7 (*)     Monocytes % 10.1 (*)     All other components within normal limits   COMPREHENSIVE METABOLIC PANEL W/ REFLEX TO MG FOR LOW K - Abnormal; Notable for the following components:    CO2 21 (*)     BUN 36 (*)     CREATININE 1.3 (*)     GFR Non-African American 40 (*)     Calcium 8.7 (*)     Alkaline Phosphatase 124 (*)     ALT 73 (*)      (*)     All other components within normal limits   URINE RT REFLEX TO CULTURE - Abnormal; Notable for the following components: Clarity, UA CLOUDY (*)     Blood, Urine TRACE-LYSED (*)     Protein, UA 30 (*)     Leukocyte Esterase, Urine SMALL (*)     All other components within normal limits   PROTIME-INR - Abnormal; Notable for the following components:    Protime 48.7 (*)     INR 5.42 (*)     All other components within normal limits    Narrative:     CALL  [place]  KLED tel. ,  Coag results called to and read back by Jelena/RN/ER, 01/25/2020 17:15, by [facility]   APTT - Abnormal; Notable for the following components:    aPTT 50.7 (*)     All other components within normal limits    Narrative:     CALL  [facility]acker  [facility] tel. ,  Coag results called to and read back by Jelena/RN/ER, 01/25/2020 17:15, by Pura Leone - Abnormal; Notable for the following components:    WBC, UA 71 (*)     All other components within normal limits   URINE CULTURE   CULTURE BLOOD #1   CULTURE BLOOD #2   AMYLASE   LIPASE   HEPATITIS PANEL, ACUTE   LACTIC ACID, PLASMA       All other labs were within normal range or not returned as of this dictation. EMERGENCY DEPARTMENT COURSE and DIFFERENTIALDIAGNOSIS/MDM:   Vitals:    Vitals:    01/25/20 1629 01/25/20 1822 01/25/20 2011   BP: (!) 184/111 (!) 147/117 (!) 181/113   Pulse: 71 80    Resp: 18 20    Temp: 98.6 °F (37 °C)     TempSrc: Oral     SpO2: 92% 90%    Weight: 252 lb (114.3 kg)     Height: 5' 7\" (1.702 m)         MDM      CONSULTS:  IP CONSULT TO HOSPITALIST  I discussed the case with Dr. Claudetta Knapp, she wants to evaluate the patient in the ED. PROCEDURES:  Unless otherwise notedbelow, none     Procedures    FINAL IMPRESSION     1. Pain of upper abdomen    2. Non-intractable vomiting with nausea, unspecified vomiting type    3. Elevated INR    4. Abnormal CT of the abdomen    5. Urinary tract infection without hematuria, site unspecified    6. Anticoagulated on Coumadin    7. History of gastric bypass    8.  Essential hypertension          DISPOSITION/PLAN   DISPOSITION        PATIENT REFERRED

## 2020-01-26 LAB
ANION GAP SERPL CALCULATED.3IONS-SCNC: 12 MMOL/L (ref 7–19)
BASOPHILS ABSOLUTE: 0 K/UL (ref 0–0.2)
BASOPHILS RELATIVE PERCENT: 0.4 % (ref 0–1)
BUN BLDV-MCNC: 29 MG/DL (ref 8–23)
BURR CELLS: ABNORMAL
CALCIUM SERPL-MCNC: 8.4 MG/DL (ref 8.8–10.2)
CHLORIDE BLD-SCNC: 106 MMOL/L (ref 98–111)
CO2: 20 MMOL/L (ref 22–29)
CREAT SERPL-MCNC: 1.3 MG/DL (ref 0.5–0.9)
EOSINOPHILS ABSOLUTE: 0.1 K/UL (ref 0–0.6)
EOSINOPHILS RELATIVE PERCENT: 0.9 % (ref 0–5)
GFR NON-AFRICAN AMERICAN: 40
GLUCOSE BLD-MCNC: 88 MG/DL (ref 74–109)
HCT VFR BLD CALC: 38.4 % (ref 37–47)
HEMOGLOBIN: 11 G/DL (ref 12–16)
HYPOCHROMIA: ABNORMAL
IMMATURE GRANULOCYTES #: 0 K/UL
INR BLD: 7.08 (ref 0.88–1.18)
LACTIC ACID: 1.4 MMOL/L (ref 0.5–1.9)
LYMPHOCYTES ABSOLUTE: 1.9 K/UL (ref 1.1–4.5)
LYMPHOCYTES RELATIVE PERCENT: 33.6 % (ref 20–40)
MACROCYTES: ABNORMAL
MAGNESIUM: 2.5 MG/DL (ref 1.6–2.4)
MCH RBC QN AUTO: 28 PG (ref 27–31)
MCHC RBC AUTO-ENTMCNC: 28.6 G/DL (ref 33–37)
MCV RBC AUTO: 97.7 FL (ref 81–99)
MONOCYTES ABSOLUTE: 0.5 K/UL (ref 0–0.9)
MONOCYTES RELATIVE PERCENT: 9.5 % (ref 0–10)
NEUTROPHILS ABSOLUTE: 3.1 K/UL (ref 1.5–7.5)
NEUTROPHILS RELATIVE PERCENT: 55.4 % (ref 50–65)
PDW BLD-RTO: 16.6 % (ref 11.5–14.5)
PLATELET # BLD: 163 K/UL (ref 130–400)
PLATELET SLIDE REVIEW: ADEQUATE
PMV BLD AUTO: 11.8 FL (ref 9.4–12.3)
POLYCHROMASIA: ABNORMAL
POTASSIUM REFLEX MAGNESIUM: 4.4 MMOL/L (ref 3.5–5)
PROTHROMBIN TIME: 60.3 SEC (ref 12–14.6)
RBC # BLD: 3.93 M/UL (ref 4.2–5.4)
SODIUM BLD-SCNC: 138 MMOL/L (ref 136–145)
WBC # BLD: 5.6 K/UL (ref 4.8–10.8)

## 2020-01-26 PROCEDURE — 2500000003 HC RX 250 WO HCPCS: Performed by: HOSPITALIST

## 2020-01-26 PROCEDURE — 83605 ASSAY OF LACTIC ACID: CPT

## 2020-01-26 PROCEDURE — 36415 COLL VENOUS BLD VENIPUNCTURE: CPT

## 2020-01-26 PROCEDURE — 1210000000 HC MED SURG R&B

## 2020-01-26 PROCEDURE — C9113 INJ PANTOPRAZOLE SODIUM, VIA: HCPCS | Performed by: HOSPITALIST

## 2020-01-26 PROCEDURE — 83735 ASSAY OF MAGNESIUM: CPT

## 2020-01-26 PROCEDURE — 2700000000 HC OXYGEN THERAPY PER DAY

## 2020-01-26 PROCEDURE — 80048 BASIC METABOLIC PNL TOTAL CA: CPT

## 2020-01-26 PROCEDURE — 6360000002 HC RX W HCPCS: Performed by: HOSPITALIST

## 2020-01-26 PROCEDURE — 85025 COMPLETE CBC W/AUTO DIFF WBC: CPT

## 2020-01-26 PROCEDURE — 85610 PROTHROMBIN TIME: CPT

## 2020-01-26 PROCEDURE — 6370000000 HC RX 637 (ALT 250 FOR IP): Performed by: HOSPITALIST

## 2020-01-26 PROCEDURE — 2580000003 HC RX 258: Performed by: HOSPITALIST

## 2020-01-26 RX ORDER — PHYTONADIONE 10 MG/ML
5 INJECTION, EMULSION INTRAMUSCULAR; INTRAVENOUS; SUBCUTANEOUS ONCE
Status: COMPLETED | OUTPATIENT
Start: 2020-01-26 | End: 2020-01-26

## 2020-01-26 RX ORDER — HYDRALAZINE HYDROCHLORIDE 20 MG/ML
10 INJECTION INTRAMUSCULAR; INTRAVENOUS EVERY 4 HOURS PRN
Status: DISCONTINUED | OUTPATIENT
Start: 2020-01-26 | End: 2020-01-28 | Stop reason: HOSPADM

## 2020-01-26 RX ADMIN — HYDROMORPHONE HYDROCHLORIDE 1 MG: 1 INJECTION, SOLUTION INTRAMUSCULAR; INTRAVENOUS; SUBCUTANEOUS at 20:13

## 2020-01-26 RX ADMIN — DICYCLOMINE HYDROCHLORIDE 20 MG: 20 INJECTION, SOLUTION INTRAMUSCULAR at 12:45

## 2020-01-26 RX ADMIN — DICYCLOMINE HYDROCHLORIDE 20 MG: 20 INJECTION, SOLUTION INTRAMUSCULAR at 20:17

## 2020-01-26 RX ADMIN — LEVOTHYROXINE SODIUM 100 MCG: 100 TABLET ORAL at 06:36

## 2020-01-26 RX ADMIN — METRONIDAZOLE 500 MG: 500 INJECTION, SOLUTION INTRAVENOUS at 20:13

## 2020-01-26 RX ADMIN — METRONIDAZOLE 500 MG: 500 INJECTION, SOLUTION INTRAVENOUS at 12:35

## 2020-01-26 RX ADMIN — PHYTONADIONE 5 MG: 10 INJECTION, EMULSION INTRAMUSCULAR; INTRAVENOUS; SUBCUTANEOUS at 12:35

## 2020-01-26 RX ADMIN — FERROUS GLUCONATE TAB 324 MG (37.5 MG ELEMENTAL IRON) 324 MG: 324 (37.5 FE) TAB at 12:35

## 2020-01-26 RX ADMIN — FERROUS GLUCONATE TAB 324 MG (37.5 MG ELEMENTAL IRON) 324 MG: 324 (37.5 FE) TAB at 17:50

## 2020-01-26 RX ADMIN — SODIUM CHLORIDE, PRESERVATIVE FREE 10 ML: 5 INJECTION INTRAVENOUS at 20:13

## 2020-01-26 RX ADMIN — DICYCLOMINE HYDROCHLORIDE 20 MG: 20 INJECTION, SOLUTION INTRAMUSCULAR at 17:50

## 2020-01-26 RX ADMIN — ESCITALOPRAM OXALATE 20 MG: 10 TABLET ORAL at 10:33

## 2020-01-26 RX ADMIN — ENOXAPARIN SODIUM 40 MG: 40 INJECTION SUBCUTANEOUS at 10:33

## 2020-01-26 RX ADMIN — ONDANSETRON 4 MG: 2 INJECTION INTRAMUSCULAR; INTRAVENOUS at 20:13

## 2020-01-26 RX ADMIN — CIPROFLOXACIN 400 MG: 2 INJECTION, SOLUTION INTRAVENOUS at 21:12

## 2020-01-26 RX ADMIN — CLONIDINE HYDROCHLORIDE 0.1 MG: 0.1 TABLET ORAL at 10:33

## 2020-01-26 RX ADMIN — HYDROMORPHONE HYDROCHLORIDE 1 MG: 1 INJECTION, SOLUTION INTRAMUSCULAR; INTRAVENOUS; SUBCUTANEOUS at 05:08

## 2020-01-26 RX ADMIN — SODIUM CHLORIDE 8 MG/HR: 9 INJECTION, SOLUTION INTRAVENOUS at 15:48

## 2020-01-26 RX ADMIN — METRONIDAZOLE 500 MG: 500 INJECTION, SOLUTION INTRAVENOUS at 04:22

## 2020-01-26 RX ADMIN — FERROUS GLUCONATE TAB 324 MG (37.5 MG ELEMENTAL IRON) 324 MG: 324 (37.5 FE) TAB at 10:33

## 2020-01-26 RX ADMIN — ONDANSETRON 4 MG: 2 INJECTION INTRAMUSCULAR; INTRAVENOUS at 05:08

## 2020-01-26 RX ADMIN — SODIUM CHLORIDE, PRESERVATIVE FREE 10 ML: 5 INJECTION INTRAVENOUS at 10:34

## 2020-01-26 RX ADMIN — CIPROFLOXACIN 400 MG: 2 INJECTION, SOLUTION INTRAVENOUS at 10:37

## 2020-01-26 RX ADMIN — DICYCLOMINE HYDROCHLORIDE 20 MG: 20 INJECTION, SOLUTION INTRAMUSCULAR at 10:34

## 2020-01-26 ASSESSMENT — PAIN SCALES - GENERAL
PAINLEVEL_OUTOF10: 0
PAINLEVEL_OUTOF10: 6
PAINLEVEL_OUTOF10: 5
PAINLEVEL_OUTOF10: 2
PAINLEVEL_OUTOF10: 0

## 2020-01-26 NOTE — H&P
bronchitis without complication 0/81/2714    Ataxic gait     Lyons's esophagus     Bowel obstruction (HCC)     Callus     Cardiac pacemaker     CKD (chronic kidney disease) stage 2, GFR 60-89 ml/min     Coat's syndrome     Coat's syndrome     both eyes    Depression     Diabetes mellitus type 2 in nonobese (HCC)     Diabetic nephropathy (HCC)     Disequilibrium     Dizziness     DVT (deep venous thrombosis) (HCC)     Exudative retinopathy     Fibromyalgia     Fibromyositis     Gastric ulcer     GERD (gastroesophageal reflux disease)     History of gastric bypass     Hx of lupus anticoagulant disorder     Hypertension     Hypothyroidism     Intermittent claudication (HCC)     Intestinal obstruction (HCC)     Iron deficiency     Left-sided weakness     Low vitamin D level     Lupus (HCC)     Menopause     Obesity     Osteoarthritis     Osteoporosis     Other iron deficiency anemias     Pernicious anemia     PUPP (pruritic urticarial papules and plaques of pregnancy)     Right leg numbness     Right sided sciatica     Sarcoidosis     with liver involvement    Sciatica     Secondary hyperparathyroidism ([address]     Shingles     Shortness of breath     Sleep apnea     Stomach ulcer     Syncope     Type II diabetes mellitus with nephropathy (HCC)     Visual loss, one eye        Past Surgical History:      Procedure Laterality Date    APPENDECTOMY      CARDIAC CATHETERIZATION  10/21/13  [facility]    EF over 60%    CHOLECYSTECTOMY      COLONOSCOPY  2/2010    negative    COLONOSCOPY  2/22/10    Dr Didier Stanleyore    COLONOSCOPY  4/1/16    Dr LAKHWINDER Horvath-internal hemorrhoids, 5 yr recall    EYE SURGERY      Cyst on Right eye    EYE SURGERY      GASTRIC BYPASS SURGERY      GASTRIC BYPASS SURGERY      HERNIA REPAIR      HYSTERECTOMY      Complete    HYSTERECTOMY      Partial - because had a tubal pregnancy.      INCONTINENCE SURGERY      Bladder Sling    OTHER SURGICAL HISTORY IVC filter    PACEMAKER INSERTION      PACEMAKER PLACEMENT      medtronic    [place] TOTAL KNEE ARTHROPLASTY Right 3/26/2018    TOTAL KNEE REPLACEMENT COMPLEX PRIMARY performed by Yuko Murphy MD at [address]      SPLENECTOMY      KRISTEL AND BSO      TONSILLECTOMY AND ADENOIDECTOMY      UPPER GASTROINTESTINAL ENDOSCOPY  12/2011    gerd s/p gastric bypass    UPPER GASTROINTESTINAL ENDOSCOPY  2/2014    normal s/p gastric bypass    UPPER GASTROINTESTINAL ENDOSCOPY  2/2010    biopsy neg Barretts, chronic reflux esophagitis s/p gastric bypass    UPPER GASTROINTESTINAL ENDOSCOPY  7/2006    unremarkable s/p gastric bypass    UPPER GASTROINTESTINAL ENDOSCOPY  8/10/15    Dr Bar Gavemaru ENDOSCOPY  4/1/16    Dr Jana Mott N/A 4/1/2016    EGD ESOPHAGOGASTRODUODENOSCOPY performed by Alhaji Perry MD at [facility]    [facility] Right 2003       Medications Prior to Admission:    Prior to Admission medications    Medication Sig Start Date End Date Taking? Authorizing Provider   potassium chloride (KLOR-CON) 10 MEQ extended release tablet Take 10 mEq by mouth daily 1/14/20   Historical Provider, MD   cloNIDine (CATAPRES) 0.1 MG tablet Take 1 tablet by mouth 2 times daily As needed for BP greater than 170/110  Patient not taking: Reported on 1/24/2020 1/15/20   Kalee Sepulveda MD   Multiple Vitamins-Minerals (CENTRUM ADULTS) TABS Take 1 tablet by mouth daily    Historical Provider, MD   escitalopram (LEXAPRO) 20 MG tablet Take 1 tablet by mouth daily 1/13/20   Kalee Sepulveda MD   pregabalin (LYRICA) 75 MG capsule Take 1 capsule by mouth 3 times daily for 90 days.  1/13/20 4/12/20  Kalee Sepulveda MD   lisinopril (PRINIVIL;ZESTRIL) 40 MG tablet Take 1 tablet by mouth daily 1/13/20   Kalee Sepulveda MD   bumetanide (BUMEX) 2 MG tablet Take 1 tablet by mouth daily 1/13/20   Kalee Sepulveda WBC 5.0   HGB 12.1        BMP:    Recent Labs     20  1632      K 4.3      CO2 21*   BUN 36*   CREATININE 1.3*   GLUCOSE 101     CMP:   Recent Labs     20  1632      K 4.3      CO2 21*   BUN 36*   CREATININE 1.3*   GLUCOSE 101   CALCIUM 8.7*   BILITOT 0.3   ALKPHOS 124*   *   ALT 73*     Hepatic:   Recent Labs     20  1632   *   ALT 73*   BILITOT 0.3   ALKPHOS 124*       Lactid Acid:    Recent Labs     20  2116   LACTA 1.2     Procalcitonin:     Troponin T: No results for input(s): TROPONINI in the last 72 hours. Pro-BNP: No results for input(s): BNP in the last 72 hours. Lipids: No results for input(s): CHOL, HDL in the last 72 hours. Invalid input(s): LDLCALCU  INR:   Recent Labs     20  1632   INR 5.42*     A1c:Invalid input(s): HEMOGLOBIN A1C    SEPSIS Criteria:   Temp: Temp  Av.9 °F (36.6 °C)  Min: 97.2 °F (36.2 °C)  Max: 98.6 °F (37 °C)    HR Range: Pulse  Av.7  Min: 70  Max: 80   RR Range: Resp  Av  Min: 16  Max: 20   WBC:   Recent Labs     20  1632   WBC 5.0      Lactic acid:   Recent Labs     20  2116   LACTA 1.2      Creatinine:   Recent Labs     20  1632   CREATININE 1.3*      Troponin: No results for input(s): TROPONINI in the last 72 hours. LFTs:   Recent Labs     20  1632   *   ALT 73*   BILITOT 0.3   ALKPHOS 124*       -----------------------------------------------------------------    Imaging Studies:    CT KIDNEY WO CONTRAST   Final Result   1. Postoperative changes of the stomach. Mild wall thickening of the   distal stomach/duodenum considered and may represent an   infectious/inflammatory process. There is stranding of the fat   adjacent to the distal stomach and duodenum with no free air or   abscess. Small volume of nonspecific ascites. No evidence for bowel   obstruction. 2. IVC filter in place.  Nodular foci within the right posterior mid   abdomen measuring up -- CODE STATUS: Full Code    Total face-to-face time with this patient, time spent reviewing medical records and in coordination of care with the emergency department physician, nursing staff was 75 minutes.     Signed:  Blayne Ferreira MD 1/25/2020 11:33 PM  Admitting Hospitalist

## 2020-01-26 NOTE — PLAN OF CARE
Problem: Falls - Risk of:  Goal: Will remain free from falls  Description  Will remain free from falls  Outcome: Ongoing  Goal: Absence of physical injury  Description  Absence of physical injury  Outcome: Ongoing     Problem: Nausea/Vomiting  Goal: Able to tolerate medications  Description  Able to tolerate medications     Outcome: Ongoing     Problem: PAIN  Goal: Able to tolerate medications  Description  Able to tolerate medications     Outcome: Ongoing     Problem: Falls - Risk of:  Goal: Will remain free from falls  Description  Will remain free from falls  Outcome: Ongoing  Goal: Absence of physical injury  Description  Absence of physical injury  Outcome: Ongoing

## 2020-01-27 LAB
ANION GAP SERPL CALCULATED.3IONS-SCNC: 13 MMOL/L (ref 7–19)
BUN BLDV-MCNC: 22 MG/DL (ref 8–23)
CALCIUM SERPL-MCNC: 8.7 MG/DL (ref 8.8–10.2)
CHLORIDE BLD-SCNC: 106 MMOL/L (ref 98–111)
CO2: 20 MMOL/L (ref 22–29)
CREAT SERPL-MCNC: 1.3 MG/DL (ref 0.5–0.9)
GFR NON-AFRICAN AMERICAN: 40
GLUCOSE BLD-MCNC: 89 MG/DL (ref 74–109)
HAV IGM SER IA-ACNC: NORMAL
HEPATITIS B CORE IGM ANTIBODY: NORMAL
HEPATITIS B SURFACE ANTIGEN INTERPRETATION: NORMAL
HEPATITIS C ANTIBODY INTERPRETATION: NORMAL
INR BLD: 3.47 (ref 0.88–1.18)
ORGANISM: ABNORMAL
POTASSIUM SERPL-SCNC: 4.7 MMOL/L (ref 3.5–5)
PROTHROMBIN TIME: 34.1 SEC (ref 12–14.6)
SODIUM BLD-SCNC: 139 MMOL/L (ref 136–145)
URINE CULTURE, ROUTINE: ABNORMAL
URINE CULTURE, ROUTINE: ABNORMAL

## 2020-01-27 PROCEDURE — 2580000003 HC RX 258: Performed by: HOSPITALIST

## 2020-01-27 PROCEDURE — 97116 GAIT TRAINING THERAPY: CPT

## 2020-01-27 PROCEDURE — 85610 PROTHROMBIN TIME: CPT

## 2020-01-27 PROCEDURE — 6360000002 HC RX W HCPCS: Performed by: HOSPITALIST

## 2020-01-27 PROCEDURE — 2500000003 HC RX 250 WO HCPCS: Performed by: HOSPITALIST

## 2020-01-27 PROCEDURE — 97161 PT EVAL LOW COMPLEX 20 MIN: CPT

## 2020-01-27 PROCEDURE — 2700000000 HC OXYGEN THERAPY PER DAY

## 2020-01-27 PROCEDURE — 93010 ELECTROCARDIOGRAM REPORT: CPT | Performed by: INTERNAL MEDICINE

## 2020-01-27 PROCEDURE — 1210000000 HC MED SURG R&B

## 2020-01-27 PROCEDURE — C9113 INJ PANTOPRAZOLE SODIUM, VIA: HCPCS | Performed by: HOSPITALIST

## 2020-01-27 PROCEDURE — 97165 OT EVAL LOW COMPLEX 30 MIN: CPT

## 2020-01-27 PROCEDURE — 80048 BASIC METABOLIC PNL TOTAL CA: CPT

## 2020-01-27 PROCEDURE — 36415 COLL VENOUS BLD VENIPUNCTURE: CPT

## 2020-01-27 PROCEDURE — 6370000000 HC RX 637 (ALT 250 FOR IP): Performed by: HOSPITALIST

## 2020-01-27 PROCEDURE — 97530 THERAPEUTIC ACTIVITIES: CPT

## 2020-01-27 RX ADMIN — CIPROFLOXACIN 400 MG: 2 INJECTION, SOLUTION INTRAVENOUS at 22:31

## 2020-01-27 RX ADMIN — DICYCLOMINE HYDROCHLORIDE 20 MG: 20 INJECTION, SOLUTION INTRAMUSCULAR at 14:14

## 2020-01-27 RX ADMIN — ESCITALOPRAM OXALATE 20 MG: 10 TABLET ORAL at 07:32

## 2020-01-27 RX ADMIN — FERROUS GLUCONATE TAB 324 MG (37.5 MG ELEMENTAL IRON) 324 MG: 324 (37.5 FE) TAB at 07:32

## 2020-01-27 RX ADMIN — DICYCLOMINE HYDROCHLORIDE 20 MG: 20 INJECTION, SOLUTION INTRAMUSCULAR at 20:57

## 2020-01-27 RX ADMIN — METRONIDAZOLE 500 MG: 500 INJECTION, SOLUTION INTRAVENOUS at 05:01

## 2020-01-27 RX ADMIN — ENOXAPARIN SODIUM 40 MG: 40 INJECTION SUBCUTANEOUS at 07:32

## 2020-01-27 RX ADMIN — LEVOTHYROXINE SODIUM 100 MCG: 100 TABLET ORAL at 05:01

## 2020-01-27 RX ADMIN — SODIUM CHLORIDE 8 MG/HR: 9 INJECTION, SOLUTION INTRAVENOUS at 23:38

## 2020-01-27 RX ADMIN — SODIUM CHLORIDE, PRESERVATIVE FREE 10 ML: 5 INJECTION INTRAVENOUS at 20:57

## 2020-01-27 RX ADMIN — CLONIDINE HYDROCHLORIDE 0.1 MG: 0.1 TABLET ORAL at 08:51

## 2020-01-27 RX ADMIN — SODIUM CHLORIDE, PRESERVATIVE FREE 10 ML: 5 INJECTION INTRAVENOUS at 07:32

## 2020-01-27 RX ADMIN — METRONIDAZOLE 500 MG: 500 INJECTION, SOLUTION INTRAVENOUS at 20:56

## 2020-01-27 RX ADMIN — FERROUS GLUCONATE TAB 324 MG (37.5 MG ELEMENTAL IRON) 324 MG: 324 (37.5 FE) TAB at 20:56

## 2020-01-27 RX ADMIN — CLONIDINE HYDROCHLORIDE 0.1 MG: 0.1 TABLET ORAL at 20:57

## 2020-01-27 RX ADMIN — SODIUM CHLORIDE 8 MG/HR: 9 INJECTION, SOLUTION INTRAVENOUS at 07:32

## 2020-01-27 RX ADMIN — METRONIDAZOLE 500 MG: 500 INJECTION, SOLUTION INTRAVENOUS at 14:13

## 2020-01-27 RX ADMIN — FERROUS GLUCONATE TAB 324 MG (37.5 MG ELEMENTAL IRON) 324 MG: 324 (37.5 FE) TAB at 14:13

## 2020-01-27 RX ADMIN — HYDROMORPHONE HYDROCHLORIDE 1 MG: 1 INJECTION, SOLUTION INTRAMUSCULAR; INTRAVENOUS; SUBCUTANEOUS at 20:56

## 2020-01-27 RX ADMIN — CIPROFLOXACIN 400 MG: 2 INJECTION, SOLUTION INTRAVENOUS at 11:13

## 2020-01-27 RX ADMIN — DICYCLOMINE HYDROCHLORIDE 20 MG: 20 INJECTION, SOLUTION INTRAMUSCULAR at 07:32

## 2020-01-27 ASSESSMENT — PAIN SCALES - GENERAL
PAINLEVEL_OUTOF10: 0
PAINLEVEL_OUTOF10: 6
PAINLEVEL_OUTOF10: 8
PAINLEVEL_OUTOF10: 4
PAINLEVEL_OUTOF10: 4

## 2020-01-27 ASSESSMENT — PAIN DESCRIPTION - LOCATION
LOCATION: FLANK

## 2020-01-27 ASSESSMENT — PAIN DESCRIPTION - PAIN TYPE
TYPE: ACUTE PAIN
TYPE: ACUTE PAIN

## 2020-01-27 ASSESSMENT — PAIN DESCRIPTION - ORIENTATION
ORIENTATION: RIGHT

## 2020-01-27 NOTE — PROGRESS NOTES
Nutrition Assessment (Low Risk)    Type and Reason for Visit: Initial, Positive Nutrition Screen    Nutrition Assessment:  Patient assessed for nutritional risk. Deemed to be at low risk at this time. Will continue to monitor for changes in status. +NS for N/V. Pt reports nausea resolved, tolerated full liquid breakfast today. Will order ONS d/t limited lactose-free options on full liquid diet, follow for diet intake and tolerance. Malnutrition Assessment:  · Malnutrition Status: No malnutrition    Nutrition Risk Level   Risk Level: Low    Nutrition Diagnosis:   · Problem: Inadequate oral intake  · Etiology:  Alteration in GI function    Signs and symptoms: Nausea, Vomiting    Nutrition Intervention:  Food and/or Delivery: Start ONS, Modify current diet  Nutrition Education/Counseling/Coordination of Care:  Continued Inpatient Monitoring      Electronically signed by Page Lynn, MS, RD, LD on 1/27/20 at 11:26 AM    Contact Number: [phone]

## 2020-01-27 NOTE — PROGRESS NOTES
Progress Note    Date:1/27/2020       Room:0426/426-02  Patient Name:Leny Stone     YOB: 1950     Age:69 y.o. Subjective   Interval History Status: not changed. Patient was seen this morning her room, still complains of abdominal pain in the right upper quadrants in the mid right quadrant area but a litle bit better than when she came in. Denies any current nausea or emesis, Denies any chest pain or shortness of breath. Review of Systems   Review of Systems  12 point system reviewed and negative except as stated above.     Medications   Scheduled Meds:    cloNIDine  0.1 mg Oral BID    escitalopram  20 mg Oral Daily    ferrous gluconate  324 mg Oral TID WC    levothyroxine  100 mcg Oral Daily    sodium chloride flush  10 mL Intravenous 2 times per day    enoxaparin  40 mg Subcutaneous Daily    metroNIDAZOLE  500 mg Intravenous Q8H    ciprofloxacin  400 mg Intravenous Q12H    [START ON 1/28/2020] pantoprazole  40 mg Intravenous Daily    And    [START ON 1/28/2020] sodium chloride (PF)  10 mL Intravenous Daily    dicyclomine  20 mg Intramuscular 4x Daily     Continuous Infusions:    pantoprozole (PROTONIX) infusion 8 mg/hr (01/27/20 0732)     PRN Meds: hydrALAZINE, sodium chloride flush, magnesium hydroxide, ondansetron, potassium chloride **OR** potassium alternative oral replacement **OR** potassium chloride, magnesium sulfate, HYDROmorphone    Past History    Past Medical History:   has a past medical history of Abdominal pain, Abnormal EKG, Acute sinusitis, Allergic reaction to spider bite, Anemia, Anticoagulated, Anxiety, Arrhythmia, Asthmatic bronchitis without complication, Ataxic gait, Lyons's esophagus, Bowel obstruction (HCC), Callus, Cardiac pacemaker, CKD (chronic kidney disease) stage 2, GFR 60-89 ml/min, Coat's syndrome, Coat's syndrome, Depression, Diabetes mellitus type 2 in nonobese ([address] Diabetic nephropathy ([address] Disequilibrium, Dizziness, DVT (deep venous considered to reevaluate these structures if no outside studies that document stability. 3. Borderline hepatomegaly. 4. Cardiomegaly with small right pleural effusion and basilar edema. CHF is favored. 5. Previous splenectomy, cholecystectomy, and hysterectomy. Anterior abdominal wall mesh in place. Signed by Dr La Jaime on 1/25/2020 6:06 PM        Assessment        Hospital Problems           Last Modified POA    * (Principal) Intractable nausea and vomiting 1/25/2020 Yes    Gastroenteritis 4/04/5065 Yes    Colic 3/27/0994 Yes    Dehydration 1/25/2020 Yes    Abdominal pain 1/25/2020 Yes          Plan:          70-year-old lady with a history of type 2 diabetes, GERD, abdominal pain, depression, DVT, anemias, gastric bypass, osteoarthritis, presented to the hospital with concerns of abdominal pain. CT of the abdomen was obtained with concerns of mild wall thickening of the distal stomach/duodenum which may represent infectious inflammatory process. Patient was initiated on ciprofloxacin and Flagyl as well as IV fluids. Intractable nausea and vomiting  Gastroenteritis with colicky abdominal pain  Continue ciprofloxacin and Flagyl for possible infectious process  Zofran for nausea  Continue Bentyl for spasms  Pain medication with Dilaudid  Bowel regimen to prevent constipation    History of hypertension  Clonidine BID, Continue IV fluids for now     Supratherapeutic INR  INR > 7; s/p 5mg or vit k  INR this AM of 3.47    Asymptomatic bacteuria. Depression continue Lexapro  Hypothyroidism continue Synthroid  GERD: Pantoprazole      Code: Full  DVT prophylaxis: Enoxaparin  Diet: Full liquids for now advance as tolerated  Disposition: Pending improvement in abdominal pain.       Electronically signed by Neetu Mckenna MD on 1/26/20 at 12:30 PM

## 2020-01-27 NOTE — PROGRESS NOTES
Physical Therapy    Facility/Department: [facility] ONCOLOGY UNIT  Initial Assessment    NAME: Mony Yip  : 1950  MRN: [id number]    Date of Service: 2020    Discharge Recommendations:  Continue to assess pending progress   PT Equipment Recommendations  Other: ASSESSING    Assessment   Body structures, Functions, Activity limitations: Decreased functional mobility ; Decreased endurance; Increased pain  Assessment: pt WORKING WELL WITH PT TO INCREASE ACTIVITY LEVEL IN PREPARATION FOR RETURN HOME AFTER D/C  Prognosis: Good  Decision Making: Low Complexity  PT Education: Functional Mobility Training;General Safety  REQUIRES PT FOLLOW UP: Yes  Activity Tolerance  Activity Tolerance: Patient Tolerated treatment well       Patient Diagnosis(es): The primary encounter diagnosis was Pain of upper abdomen. Diagnoses of Non-intractable vomiting with nausea, unspecified vomiting type, Elevated INR, Abnormal CT of the abdomen, Urinary tract infection without hematuria, site unspecified, Anticoagulated on Coumadin, History of gastric bypass, and Essential hypertension were also pertinent to this visit.      has a past medical history of Abdominal pain, Abnormal EKG, Acute sinusitis, Allergic reaction to spider bite, Anemia, Anticoagulated, Anxiety, Arrhythmia, Asthmatic bronchitis without complication, Ataxic gait, Lyons's esophagus, Bowel obstruction (HCC), Callus, Cardiac pacemaker, CKD (chronic kidney disease) stage 2, GFR 60-89 ml/min, Coat's syndrome, Coat's syndrome, Depression, Diabetes mellitus type 2 in nonobese ([address] Diabetic nephropathy ([address] Disequilibrium, Dizziness, DVT (deep venous thrombosis) ([address] Exudative retinopathy, Fibromyalgia, Fibromyositis, Gastric ulcer, GERD (gastroesophageal reflux disease), History of gastric bypass, Hx of lupus anticoagulant disorder, Hypertension, Hypothyroidism, Intermittent claudication ([address] Intestinal obstruction (HCC), Iron deficiency, Left-sided weakness, Low vitamin D level, Lupus ([address] Menopause, Obesity, Osteoarthritis, Osteoporosis, Other iron deficiency anemias, Pernicious anemia, PUPP (pruritic urticarial papules and plaques of pregnancy), Right leg numbness, Right sided sciatica, Sarcoidosis, Sciatica, Secondary hyperparathyroidism ([address] Shingles, Shortness of breath, Sleep apnea, Stomach ulcer, Syncope, Type II diabetes mellitus with nephropathy ([address] and Visual loss, one eye.   has a past surgical history that includes Pacemaker insertion; Gastric bypass surgery; chuy and bso (cervix removed); hernia repair; Cholecystectomy; Splenectomy; eye surgery; Colonoscopy (2/2010); Upper gastrointestinal endoscopy (12/2011); Appendectomy; Tonsillectomy and adenoidectomy; Cardiac catheterization (10/21/13  [facility]); Hysterectomy; Incontinence surgery; Hysterectomy; Upper gastrointestinal endoscopy (2/2014); Upper gastrointestinal endoscopy (2/2010); Upper gastrointestinal endoscopy (7/2006); Colonoscopy (2/22/10); Upper gastrointestinal endoscopy (8/10/15); Colonoscopy (4/1/16); Upper gastrointestinal endoscopy (4/1/16); Upper gastrointestinal endoscopy (N/A, 4/1/2016); other surgical history; Small intestine surgery; pacemaker placement; Vena Cava Filter Placement (Right, 2003); Eye surgery; Gastric bypass surgery; Small intestine surgery; and pr total knee arthroplasty (Right, 3/26/2018). Restrictions  Restrictions/Precautions  Restrictions/Precautions: Fall Risk  Vision/Hearing        Subjective  General  Patient assessed for rehabilitation services?: Yes  Diagnosis: intractible n/v  Follows Commands: Within Functional Limits  General Comment  Comments: on 02 but does not wear at home.  02 returned to pt after amb (pt states she did not need to amb)  Subjective  Subjective: pT reports she is having continued nausea and abdominal pain that hinders her ability to perform ambulation/activity  Pain Screening  Patient Currently in Pain: Yes          Orientation     Social/Functional

## 2020-01-27 NOTE — PROGRESS NOTES
Occupational Therapy   Occupational Therapy Initial Assessment  Date: 2020   Patient Name: Isiah Dugan  MRN: [id number]     : 1950    Date of Service: 2020    Discharge Recommendations:  Home with assist PRN       Assessment   Assessment: Evaluation completed and tx initiated. Instruction on therapeutic exercises to prevent deconditioning while hospitalized. No futher skilled therapy needed. Treatment Diagnosis: Intractable nausea and vomiting, epigastric right flank pain  REQUIRES OT FOLLOW UP: No  Activity Tolerance  Activity Tolerance: Patient limited by pain  Safety Devices  Safety Devices in place: Yes  Type of devices: Left in bed;Call light within reach; Bed alarm in place           Patient Diagnosis(es): The primary encounter diagnosis was Pain of upper abdomen. Diagnoses of Non-intractable vomiting with nausea, unspecified vomiting type, Elevated INR, Abnormal CT of the abdomen, Urinary tract infection without hematuria, site unspecified, Anticoagulated on Coumadin, History of gastric bypass, and Essential hypertension were also pertinent to this visit.      has a past medical history of Abdominal pain, Abnormal EKG, Acute sinusitis, Allergic reaction to spider bite, Anemia, Anticoagulated, Anxiety, Arrhythmia, Asthmatic bronchitis without complication, Ataxic gait, Lyons's esophagus, Bowel obstruction (HCC), Callus, Cardiac pacemaker, CKD (chronic kidney disease) stage 2, GFR 60-89 ml/min, Coat's syndrome, Coat's syndrome, Depression, Diabetes mellitus type 2 in nonobese ([address] Diabetic nephropathy ([address] Disequilibrium, Dizziness, DVT (deep venous thrombosis) ([address] Exudative retinopathy, Fibromyalgia, Fibromyositis, Gastric ulcer, GERD (gastroesophageal reflux disease), History of gastric bypass, Hx of lupus anticoagulant disorder, Hypertension, Hypothyroidism, Intermittent claudication ([address] Intestinal obstruction (HCC), Iron deficiency, Left-sided weakness, Low vitamin D level, Lupus History  Social/Functional History  Lives With: Daughter  Type of Home: House  Home Access: Stairs to enter with rails(3 up, then 3 down)  Bathroom Shower/Tub: Tub/Shower unit  ADL Assistance: Independent  Homemaking Assistance: Independent  Ambulation Assistance: Independent  Additional Comments: has had a RW and BSC after her knee replacement but her mother is using them now       Objective        Orientation  Overall Orientation Status: Within Normal Limits     Toilet Transfers  Toilet - Technique: Stand step  Toilet Transfer: Supervision  ADL  Feeding: Independent  Grooming: Independent  UE Bathing: Independent  LE Bathing: Stand by assistance  UE Dressing: Independent  LE Dressing: Stand by assistance  Toileting: Contact guard assistance  Additional Comments: limited primarily by pain           Transfers  Stand Step Transfers: Supervision                       LUE PROM (degrees)  LUE PROM: WNL  LUE AROM (degrees)  LUE AROM : WNL  RUE PROM (degrees)  RUE PROM: WNL  RUE AROM (degrees)  RUE AROM : WNL                    Tx initiated:   Therapeutic activity training (10 mins)    Plan   Plan  Plan Comment: No further visits planned    G-Code     OutComes Score                                                  AM-PAC Score             Goals  Short term goals  Short term goal 1: Independent with therapeutic activity recommendations(met)       Therapy Time   Individual Concurrent Group Co-treatment   Time In           Time Out Pedritofmirian Neal OT Electronically signed by Chica Stockton OT on 1/27/2020 at 11:00 AM

## 2020-01-28 VITALS
HEART RATE: 65 BPM | TEMPERATURE: 99.1 F | HEIGHT: 67 IN | RESPIRATION RATE: 20 BRPM | OXYGEN SATURATION: 93 % | WEIGHT: 252 LBS | DIASTOLIC BLOOD PRESSURE: 67 MMHG | SYSTOLIC BLOOD PRESSURE: 142 MMHG | BODY MASS INDEX: 39.55 KG/M2

## 2020-01-28 LAB
ANION GAP SERPL CALCULATED.3IONS-SCNC: 13 MMOL/L (ref 7–19)
BUN BLDV-MCNC: 15 MG/DL (ref 8–23)
CALCIUM SERPL-MCNC: 8.5 MG/DL (ref 8.8–10.2)
CHLORIDE BLD-SCNC: 104 MMOL/L (ref 98–111)
CO2: 20 MMOL/L (ref 22–29)
CREAT SERPL-MCNC: 1.3 MG/DL (ref 0.5–0.9)
GFR NON-AFRICAN AMERICAN: 40
GLUCOSE BLD-MCNC: 94 MG/DL (ref 74–109)
INR BLD: 3.26 (ref 0.88–1.18)
POTASSIUM SERPL-SCNC: 4.2 MMOL/L (ref 3.5–5)
PROTHROMBIN TIME: 32.4 SEC (ref 12–14.6)
SODIUM BLD-SCNC: 137 MMOL/L (ref 136–145)

## 2020-01-28 PROCEDURE — 2580000003 HC RX 258: Performed by: HOSPITALIST

## 2020-01-28 PROCEDURE — 36415 COLL VENOUS BLD VENIPUNCTURE: CPT

## 2020-01-28 PROCEDURE — 6360000002 HC RX W HCPCS: Performed by: HOSPITALIST

## 2020-01-28 PROCEDURE — 2500000003 HC RX 250 WO HCPCS: Performed by: HOSPITALIST

## 2020-01-28 PROCEDURE — 6370000000 HC RX 637 (ALT 250 FOR IP): Performed by: HOSPITALIST

## 2020-01-28 PROCEDURE — 85610 PROTHROMBIN TIME: CPT

## 2020-01-28 PROCEDURE — 80048 BASIC METABOLIC PNL TOTAL CA: CPT

## 2020-01-28 RX ORDER — PANTOPRAZOLE SODIUM 40 MG/1
40 TABLET, DELAYED RELEASE ORAL
Status: DISCONTINUED | OUTPATIENT
Start: 2020-01-28 | End: 2020-01-28 | Stop reason: HOSPADM

## 2020-01-28 RX ORDER — CIPROFLOXACIN 500 MG/1
500 TABLET, FILM COATED ORAL 2 TIMES DAILY
Qty: 8 TABLET | Refills: 0 | Status: SHIPPED | OUTPATIENT
Start: 2020-01-28 | End: 2020-02-01

## 2020-01-28 RX ORDER — WARFARIN SODIUM 7.5 MG/1
TABLET ORAL
Qty: 150 TABLET | Refills: 3 | Status: ON HOLD | OUTPATIENT
Start: 2020-01-28 | End: 2020-09-30 | Stop reason: SDUPTHER

## 2020-01-28 RX ORDER — HYDROCODONE BITARTRATE AND ACETAMINOPHEN 5; 325 MG/1; MG/1
1 TABLET ORAL EVERY 6 HOURS PRN
Qty: 12 TABLET | Refills: 0 | Status: SHIPPED | OUTPATIENT
Start: 2020-01-28 | End: 2020-01-31

## 2020-01-28 RX ORDER — POLYETHYLENE GLYCOL 3350 17 G/17G
17 POWDER, FOR SOLUTION ORAL DAILY PRN
Qty: 116 G | Refills: 0 | Status: SHIPPED | OUTPATIENT
Start: 2020-01-28 | End: 2020-02-04

## 2020-01-28 RX ORDER — METRONIDAZOLE 500 MG/1
500 TABLET ORAL 3 TIMES DAILY
Qty: 12 TABLET | Refills: 0 | Status: SHIPPED | OUTPATIENT
Start: 2020-01-28 | End: 2020-02-01

## 2020-01-28 RX ORDER — HYDROCODONE BITARTRATE AND ACETAMINOPHEN 5; 325 MG/1; MG/1
1 TABLET ORAL EVERY 6 HOURS PRN
Status: DISCONTINUED | OUTPATIENT
Start: 2020-01-28 | End: 2020-01-28 | Stop reason: HOSPADM

## 2020-01-28 RX ADMIN — HYDROCODONE BITARTRATE AND ACETAMINOPHEN 1 TABLET: 5; 325 TABLET ORAL at 08:51

## 2020-01-28 RX ADMIN — SODIUM CHLORIDE, PRESERVATIVE FREE 10 ML: 5 INJECTION INTRAVENOUS at 08:53

## 2020-01-28 RX ADMIN — LEVOTHYROXINE SODIUM 100 MCG: 100 TABLET ORAL at 08:51

## 2020-01-28 RX ADMIN — FERROUS GLUCONATE TAB 324 MG (37.5 MG ELEMENTAL IRON) 324 MG: 324 (37.5 FE) TAB at 08:51

## 2020-01-28 RX ADMIN — ESCITALOPRAM OXALATE 20 MG: 10 TABLET ORAL at 08:51

## 2020-01-28 RX ADMIN — ENOXAPARIN SODIUM 40 MG: 40 INJECTION SUBCUTANEOUS at 08:51

## 2020-01-28 RX ADMIN — DICYCLOMINE HYDROCHLORIDE 20 MG: 20 INJECTION, SOLUTION INTRAMUSCULAR at 08:50

## 2020-01-28 RX ADMIN — METRONIDAZOLE 500 MG: 500 INJECTION, SOLUTION INTRAVENOUS at 03:39

## 2020-01-28 RX ADMIN — CLONIDINE HYDROCHLORIDE 0.1 MG: 0.1 TABLET ORAL at 08:51

## 2020-01-28 RX ADMIN — PANTOPRAZOLE SODIUM 40 MG: 40 TABLET, DELAYED RELEASE ORAL at 09:54

## 2020-01-28 ASSESSMENT — PAIN DESCRIPTION - LOCATION
LOCATION: ABDOMEN
LOCATION: ABDOMEN

## 2020-01-28 ASSESSMENT — PAIN DESCRIPTION - ORIENTATION
ORIENTATION: RIGHT
ORIENTATION: RIGHT

## 2020-01-28 ASSESSMENT — PAIN SCALES - GENERAL
PAINLEVEL_OUTOF10: 8
PAINLEVEL_OUTOF10: 7

## 2020-01-28 ASSESSMENT — PAIN DESCRIPTION - PAIN TYPE
TYPE: ACUTE PAIN
TYPE: ACUTE PAIN

## 2020-01-28 NOTE — CARE COORDINATION
Given this pt's readmission risk score being greater than 10% (current 22%), consider the initiation of Home Care services (medication management). If you feel appropriate, please order at discharge. Thank you.     Electronically signed by Agata Simons on 1/27/2020 at 8:47 AM
Predict tool sent at 12:31pm,  patient discharged at 0850am this morning.
live with?:  Child  Are you an active caregiver in your home?:  No  Social Support  Durable Medical Equipment  Patient DME:  Straight cane  Patient Home Equipment:  CPAP  Functional Review  Ability to seek help/take action for Emergent/Urgent situations i.e. fire, crime, inclement weather or health crisis. :  Independent  Ability handle personal hygiene needs (bathing/dressing/grooming): Independent  Ability to manage medications: Independent  Ability to prepare food:  Independent  Ability to maintain home (clean home, laundry): Independent  Ability to drive and/or has transportation:  Needs Assistance  Ability to do shopping:  Independent  Ability to manage finances: Independent  Is patient able to live independently?:  Yes  Hearing and Vision  Care Transitions Interventions                                 Follow Up: Met at bedside with patient and discussed CTC process, including telephone follow up. Contact information given. Patient is agreeable with appropriate follow up as indicated after discharge. Patient reported that she lives at home with her daughter. She denies having any DME equipment except for a CPAP machine. She denies any need for other at this time. She is not aware of any financial issues or issues with obtaining medications or supplies. She is not aware of any needs. Will follow along while here and after discharge as indicated. Future Appointments   Date Time Provider Jose Shea   3/6/2020 11:15 AM MD HIPOLITO Knight [email or web address]   3/20/2020 10:30 AM CHANELLE Vera [email or web address]   3/20/2020 10:50 AM SCHEDULE, L MED ONC [facility] ONC Shannan [facility]       Health Maintenance  There are no preventive care reminders to display for this patient.     Paul Bowles RN

## 2020-01-28 NOTE — PLAN OF CARE
Problem: Falls - Risk of:  Goal: Will remain free from falls  Description  Will remain free from falls  Outcome: Ongoing  Goal: Absence of physical injury  Description  Absence of physical injury  Outcome: Ongoing     Problem: Nausea/Vomiting  Goal: Able to tolerate medications  Description  Able to tolerate medications     Outcome: Ongoing     Problem: PAIN  Goal: Able to tolerate medications  Description  Able to tolerate medications     Outcome: Ongoing     Problem: Pain:  Goal: Pain level will decrease  Description  Pain level will decrease  Outcome: Ongoing  Goal: Control of acute pain  Description  Control of acute pain  Outcome: Ongoing  Goal: Control of chronic pain  Description  Control of chronic pain  Outcome: Ongoing

## 2020-01-28 NOTE — PROGRESS NOTES
Physical Therapy  Name: Jordan Batch  MRN:  [id number]  Date of service:  1/28/2020 01/28/20 1033   General   Missed reason Patient declined   Subjective   Subjective Pt refused stating she is going home today.         Electronically signed by Sabine Mayes PTA on 1/28/2020 at 10:34 AM

## 2020-01-28 NOTE — DISCHARGE SUMMARY
Performed:  NONE    Significant Diagnostic Studies:   Recent Labs:  CBC:   Lab Results   Component Value Date    WBC 5.6 01/26/2020    RBC 3.93 01/26/2020    HGB 11.0 01/26/2020    HCT 38.4 01/26/2020    MCV 97.7 01/26/2020    MCH 28.0 01/26/2020    MCHC 28.6 01/26/2020    RDW 16.6 01/26/2020     01/26/2020     BMP:    Lab Results   Component Value Date    GLUCOSE 94 01/28/2020     01/28/2020    K 4.2 01/28/2020    K 4.4 01/26/2020     01/28/2020    CO2 20 01/28/2020    ANIONGAP 13 01/28/2020    BUN 15 01/28/2020    CREATININE 1.3 01/28/2020    CALCIUM 8.5 01/28/2020    LABGLOM 40 01/28/2020       Radiology Last 7 Days:  Ct Kidney Wo Contrast    Result Date: 1/25/2020  1. Postoperative changes of the stomach. Mild wall thickening of the distal stomach/duodenum considered and may represent an infectious/inflammatory process. There is stranding of the fat adjacent to the distal stomach and duodenum with no free air or abscess. Small volume of nonspecific ascites. No evidence for bowel obstruction. 2. IVC filter in place. Nodular foci within the right posterior mid abdomen measuring up to 1.8 cm of uncertain etiology. Small lymph nodes considered, however collateral vascular structures could have a similar appearance. Nonopacified bowel loops thought to be less likely. A follow-up nonemergent CT abdomen and pelvis study with IV and oral contrast might be considered to reevaluate these structures if no outside studies that document stability. 3. Borderline hepatomegaly. 4. Cardiomegaly with small right pleural effusion and basilar edema. CHF is favored. 5. Previous splenectomy, cholecystectomy, and hysterectomy. Anterior abdominal wall mesh in place.  Signed by Dr Rondel Schirmer on 1/25/2020 6:06 PM      Discharge Plan   Disposition: Home    Provider Follow-Up:   Gary Self MD  [facility] [address] Pastor Gallegos [address] [facility] [phone]             Patient Instructions   Diet: low fat, low levothyroxine 100 MCG tablet  Commonly known as:  SYNTHROID  Take 1 tablet by mouth Daily     lisinopril 40 MG tablet  Commonly known as:  PRINIVIL;ZESTRIL  Take 1 tablet by mouth daily     pantoprazole sodium 40 MG Pack packet  Commonly known as:  PROTONIX  Take 1 packet by mouth every morning (before breakfast)     potassium chloride 10 MEQ extended release tablet  Commonly known as:  KLOR-CON M  Take 1 tablet by mouth daily     pregabalin 75 MG capsule  Commonly known as:  Lyrica  Take 1 capsule by mouth 3 times daily for 90 days. tiotropium 18 MCG inhalation capsule  Commonly known as:  Spiriva HandiHaler  Inhale 1 capsule into the lungs daily     vitamin D 1.25 MG (53853 UT) Caps capsule  Commonly known as:  ERGOCALCIFEROL  Take 1 capsule by mouth Twice a Week         * This list has 2 medication(s) that are the same as other medications prescribed for you. Read the directions carefully, and ask your doctor or other care provider to review them with you.             STOP taking these medications    potassium chloride 10 MEQ extended release tablet  Commonly known as:  KLOR-CON           Where to Get Your Medications      These medications were sent to [facility] [address] [place] [address] Rosalee Todd, [address] [facility] [place] [address]    Phone:  [phone]   · ciprofloxacin 500 MG tablet  · metroNIDAZOLE 500 MG tablet  · polyethylene glycol powder  · warfarin 7.5 MG tablet     You can get these medications from any pharmacy    Bring a paper prescription for each of these medications  · HYDROcodone-acetaminophen 5-325 MG per tablet         Electronically signed by Daniel Mendiola MD on 1/28/20 at 12:01 PM

## 2020-01-29 ENCOUNTER — CARE COORDINATION (OUTPATIENT)
Dept: CASE MANAGEMENT | Age: 70
End: 2020-01-29

## 2020-01-29 NOTE — CARE COORDINATION
Noah 45 Transitions Initial Follow Up Call    Call within 2 business days of discharge: Yes    Patient: Marck Gutierrez Patient : 1950   MRN: [id number]    Discharge Date: 20 RARS: Readmission Risk Score: 23      Last Discharge [facility]       Complaint Diagnosis Description Type Department Provider    20 Flank Pain; Nausea Pain of upper abdomen . .. ED to Hosp-Admission (Discharged) (ADMITTED) L ONC Rodney Bennett MD; Yanick Paul. .. Spoke with: N/A    Facility: [place] [address]    Non-face-to-face services provided:  Reviewed encounter information for continuity of care prior to follow up phone call - chart notes, consults, progress notes, test results, med list, appointments, AVS, other information. Care Transitions 24 Hour Call    Do you have all of your prescriptions and are they filled?:  No  Patient DME:  Straight cane  Patient Home Equipment:  CPAP  Do you have support at home?:  Child  Are you an active caregiver in your home?:  No  Care Transitions Interventions                                 Follow Up: Attempted to make contact with patient for an initial follow up call post discharge without success. Unable to leave a message regarding intent of call and call back information as unidentifiable voice mail box was full. Will try again at a later time.      Future Appointments   Date Time Provider Jose Shea   3/6/2020 11:15 AM MD HIPOLITO Story [facility]   3/20/2020 10:30 AM [facility]   3/20/2020 10:50 AM SCHEDULE, L MED ONC MA L MED ONC Shannan Nelson RN

## 2020-01-30 ENCOUNTER — CARE COORDINATION (OUTPATIENT)
Dept: CASE MANAGEMENT | Age: 70
End: 2020-01-30

## 2020-01-30 LAB
EKG P AXIS: 48 DEGREES
EKG P-R INTERVAL: 144 MS
EKG Q-T INTERVAL: 436 MS
EKG QRS DURATION: 110 MS
EKG QTC CALCULATION (BAZETT): 450 MS
EKG T AXIS: -134 DEGREES

## 2020-01-30 NOTE — CARE COORDINATION
Noah 45 Transitions Initial Follow Up Call    Call within 2 business days of discharge: Yes    Patient: Emily Ramirez Patient : 1950   MRN: [id number]    Discharge Date: 20 RARS: Readmission Risk Score: 23      Last Discharge [facility]       Complaint Diagnosis Description Type Department Provider    20 Flank Pain; Nausea Pain of upper abdomen . .. ED to Hosp-Admission (Discharged) (ADMITTED) [facility] ONC Zander Velarde MD; Margaret Victoria. .. Spoke with: [facility]: [facility]    Non-face-to-face services provided:  Reviewed encounter information for continuity of care prior to follow up phone call - chart notes, consults, progress notes, test results, med list, appointments, AVS, other information. Care Transitions 24 Hour Call    Do you have all of your prescriptions and are they filled?:  Yes  Patient DME:  Straight cane  Patient Home Equipment:  CPAP  Do you have support at home?:  Child  Are you an active caregiver in your home?:  No  Care Transitions Interventions                                 Follow Up: Placed a call back to the patient and informed her of her appointment time for tomorrow at 11:00 am.  She voiced understanding. Sounds weak, reported that she is trying not to move a lot. Will follow up as indicated.      Plan for next call - assess overall status, GI status, pain, appetite, bowel patterns, abnormal signs and symptoms, effectiveness of medications, activity level and tolerance, falls/other unexpected events, findings from follow up appointments, medication/treatment changes, any additional teaching needs, etc.      Future Appointments   Date Time Provider Jose Shea   2020 11:00 AM MD HIPOLITO Barlow [facility]   3/6/2020 11:15 AM MD HIPOLITO Barlow [facility]   3/20/2020 10:30 AM CHANELLE Marsh [facility]   3/20/2020 10:50 AM SCHEDULE, L MED ONC MA L MED ONC Shannan Kwon RN

## 2020-01-30 NOTE — CARE COORDINATION
Noah 45 Transitions Initial Follow Up Call    Call within 2 business days of discharge: Yes    Patient: Yoana Last Patient : 1950   MRN: [id number]    Discharge Date: 20 RARS: Readmission Risk Score: 23      Last Discharge [facility]       Complaint Diagnosis Description Type Department Provider    20 Flank Pain; Nausea Pain of upper abdomen . .. ED to Hosp-Admission (Discharged) (ADMITTED) [facility] ONC Jakob Gaytan MD; Minus Covert. .. Spoke with: [address] [place] [address]: [facility]    Non-face-to-face services provided:  Reviewed encounter information for continuity of care prior to follow up phone call - chart notes, consults, progress notes, test results, med list, appointments, AVS, other information. Care Transitions 24 Hour Call    Do you have any ongoing symptoms?:  Yes  Patient-reported symptoms:  Abdominal Pain, Fatigue, Nausea, Pain, Weakness  Do you have a copy of your discharge instructions?:  Yes  Do you have all of your prescriptions and are they filled?:  Yes  Have you been contacted by a [address]?:  No  Have you scheduled your follow up appointment?:  No  Were you discharged with any Home Care or Post Acute Services:  No  Patient DME:  Straight cane  Patient Home Equipment:  CPAP  Do you have support at home?:  Child  Do you feel like you have everything you need to keep you well at home?:  Yes  Are you an active caregiver in your home?:  No  Care Transitions Interventions                                 Follow Up: Placed a call to the patient for an initial follow up call. She reported that she is not doing well today. She said she is still having abdominal pain and it has not gotten any better since discharge. She is not able to eat due to the pain and also nausea. She has not had a bm since being discharged. She said she has not seen her PCP or called to report her symptoms. She was agreeable with getting in tomorrow if she can.

## 2020-01-30 NOTE — CARE COORDINATION
Phoned the PCP office and spoke with Yolanda Hooks, phone nurse, and was given tomorrow at 11:00 am for an appointment with the provider. Will let patient know.

## 2020-01-31 ENCOUNTER — OFFICE VISIT (OUTPATIENT)
Dept: INTERNAL MEDICINE | Age: 70
End: 2020-01-31
Payer: MEDICARE

## 2020-01-31 ENCOUNTER — HOSPITAL ENCOUNTER (OUTPATIENT)
Dept: CT IMAGING | Age: 70
Discharge: HOME | End: 2020-01-31
Payer: MEDICARE

## 2020-01-31 ENCOUNTER — CARE COORDINATION (OUTPATIENT)
Dept: CASE MANAGEMENT | Age: 70
End: 2020-01-31

## 2020-01-31 VITALS
HEART RATE: 76 BPM | BODY MASS INDEX: 36.57 KG/M2 | WEIGHT: 233 LBS | OXYGEN SATURATION: 97 % | HEIGHT: 67 IN | SYSTOLIC BLOOD PRESSURE: 195 MMHG | DIASTOLIC BLOOD PRESSURE: 115 MMHG

## 2020-01-31 DIAGNOSIS — Z87.440 RECENT URINARY TRACT INFECTION: ICD-10-CM

## 2020-01-31 DIAGNOSIS — K29.80 DUODENITIS: ICD-10-CM

## 2020-01-31 DIAGNOSIS — Z79.01 ANTICOAGULATED: ICD-10-CM

## 2020-01-31 LAB
ALBUMIN SERPL-MCNC: 4 G/DL (ref 3.5–5.2)
ALP BLD-CCNC: 116 U/L (ref 35–104)
ALT SERPL-CCNC: 35 U/L (ref 5–33)
ANION GAP SERPL CALCULATED.3IONS-SCNC: 19 MMOL/L (ref 7–19)
AST SERPL-CCNC: 53 U/L (ref 5–32)
BACTERIA: NORMAL /HPF
BILIRUB SERPL-MCNC: 0.9 MG/DL (ref 0.2–1.2)
BILIRUBIN URINE: NEGATIVE
BLOOD CULTURE, ROUTINE: NORMAL
BLOOD, URINE: ABNORMAL
BUN BLDV-MCNC: 12 MG/DL (ref 8–23)
CALCIUM SERPL-MCNC: 9.3 MG/DL (ref 8.8–10.2)
CHLORIDE BLD-SCNC: 96 MMOL/L (ref 98–111)
CLARITY: CLEAR
CO2: 22 MMOL/L (ref 22–29)
COLOR: YELLOW
CREAT SERPL-MCNC: 1 MG/DL (ref 0.5–0.9)
CULTURE, BLOOD 2: NORMAL
EPITHELIAL CELLS, UA: NORMAL /HPF
GFR NON-AFRICAN AMERICAN: 55
GLUCOSE BLD-MCNC: 103 MG/DL (ref 74–109)
GLUCOSE URINE: NEGATIVE MG/DL
HCT VFR BLD CALC: 46.1 % (ref 37–47)
HEMOGLOBIN: 14.3 G/DL (ref 12–16)
INR BLD: 1.54 (ref 0.88–1.18)
KETONES, URINE: 15 MG/DL
LEUKOCYTE ESTERASE, URINE: NEGATIVE
MCH RBC QN AUTO: 28 PG (ref 27–31)
MCHC RBC AUTO-ENTMCNC: 31 G/DL (ref 33–37)
MCV RBC AUTO: 90.2 FL (ref 81–99)
NITRITE, URINE: NEGATIVE
PDW BLD-RTO: 15.9 % (ref 11.5–14.5)
PH UA: 7 (ref 5–8)
PLATELET # BLD: 191 K/UL (ref 130–400)
PMV BLD AUTO: 12.3 FL (ref 9.4–12.3)
POTASSIUM SERPL-SCNC: 3.8 MMOL/L (ref 3.5–5)
PROTEIN UA: 100 MG/DL
PROTHROMBIN TIME: 17.8 SEC (ref 12–14.6)
RBC # BLD: 5.11 M/UL (ref 4.2–5.4)
RBC UA: NORMAL /HPF (ref 0–2)
SODIUM BLD-SCNC: 137 MMOL/L (ref 136–145)
SPECIFIC GRAVITY UA: 1.02 (ref 1–1.03)
TOTAL PROTEIN: 8.9 G/DL (ref 6.6–8.7)
URINE REFLEX TO CULTURE: ABNORMAL
UROBILINOGEN, URINE: 0.2 E.U./DL
WBC # BLD: 6.1 K/UL (ref 4.8–10.8)
WBC UA: NORMAL /HPF (ref 0–5)

## 2020-01-31 PROCEDURE — 6360000004 HC RX CONTRAST MEDICATION: Performed by: INTERNAL MEDICINE

## 2020-01-31 PROCEDURE — 1111F DSCHRG MED/CURRENT MED MERGE: CPT | Performed by: INTERNAL MEDICINE

## 2020-01-31 PROCEDURE — 99496 TRANSJ CARE MGMT HIGH F2F 7D: CPT | Performed by: INTERNAL MEDICINE

## 2020-01-31 PROCEDURE — 74178 CT ABD&PLV WO CNTR FLWD CNTR: CPT

## 2020-01-31 RX ORDER — ONDANSETRON 4 MG/1
4 TABLET, FILM COATED ORAL DAILY PRN
Qty: 30 TABLET | Refills: 0 | Status: SHIPPED | OUTPATIENT
Start: 2020-01-31 | End: 2022-02-14 | Stop reason: SDUPTHER

## 2020-01-31 RX ADMIN — IOPAMIDOL 75 ML: 755 INJECTION, SOLUTION INTRAVENOUS at 13:39

## 2020-01-31 ASSESSMENT — ENCOUNTER SYMPTOMS
STRIDOR: 0
TROUBLE SWALLOWING: 0
VOMITING: 0
EYE ITCHING: 0
SORE THROAT: 0
RHINORRHEA: 0
COLOR CHANGE: 0
CHEST TIGHTNESS: 0
VOICE CHANGE: 0
APNEA: 0
BACK PAIN: 1
ABDOMINAL DISTENTION: 1
COUGH: 0
ABDOMINAL PAIN: 1
BLOOD IN STOOL: 0
CONSTIPATION: 0
SINUS PRESSURE: 0
SHORTNESS OF BREATH: 0
SINUS PAIN: 0
EYE DISCHARGE: 0
NAUSEA: 1
DIARRHEA: 0
WHEEZING: 0

## 2020-01-31 NOTE — PROGRESS NOTES
tablet  Take 1 tablet by mouth 3 times daily for 4 days             Multiple Vitamins-Minerals (CENTRUM ADULTS) TABS  Take 1 tablet by mouth daily             ondansetron (ZOFRAN) 4 MG tablet  Take 1 tablet by mouth daily as needed for Nausea or Vomiting             pantoprazole sodium (PROTONIX) 40 MG PACK packet  Take 1 packet by mouth every morning (before breakfast)             polyethylene glycol (GLYCOLAX) powder  Take 17 g by mouth daily as needed (constipation)             potassium chloride (KLOR-CON M) 10 MEQ extended release tablet  Take 1 tablet by mouth daily             pregabalin (LYRICA) 75 MG capsule  Take 1 capsule by mouth 3 times daily for 90 days. tiotropium (SPIRIVA HANDIHALER) 18 MCG inhalation capsule  Inhale 1 capsule into the lungs daily             vitamin D (ERGOCALCIFEROL) 34255 units CAPS capsule  Take 1 capsule by mouth Twice a Week             warfarin (COUMADIN) 7.5 MG tablet  2 tablets daily, DO NOT RESUME UNTIL YOU RECHECK YOUR INR ON THURSDAY AND DISCUSS WITH PCP                   Medications marked \"taking\" at this time  Outpatient Medications Marked as Taking for the 1/31/20 encounter (Office Visit) with Ivania Caldwell MD   Medication Sig Dispense Refill    ondansetron (ZOFRAN) 4 MG tablet Take 1 tablet by mouth daily as needed for Nausea or Vomiting 30 tablet 0    HYDROcodone-acetaminophen (NORCO) 5-325 MG per tablet Take 1 tablet by mouth every 6 hours as needed for Pain for up to 3 days.  12 tablet 0    warfarin (COUMADIN) 7.5 MG tablet 2 tablets daily, DO NOT RESUME UNTIL YOU RECHECK YOUR INR ON THURSDAY AND DISCUSS WITH  tablet 3    ciprofloxacin (CIPRO) 500 MG tablet Take 1 tablet by mouth 2 times daily for 4 days 8 tablet 0    metroNIDAZOLE (FLAGYL) 500 MG tablet Take 1 tablet by mouth 3 times daily for 4 days 12 tablet 0    polyethylene glycol (GLYCOLAX) powder Take 17 g by mouth daily as needed (constipation) 116 g 0    Multiple Vitamins-Minerals (CENTRUM ADULTS) TABS Take 1 tablet by mouth daily      escitalopram (LEXAPRO) 20 MG tablet Take 1 tablet by mouth daily 90 tablet 2    pregabalin (LYRICA) 75 MG capsule Take 1 capsule by mouth 3 times daily for 90 days. 90 capsule 2    lisinopril (PRINIVIL;ZESTRIL) 40 MG tablet Take 1 tablet by mouth daily 90 tablet 0    bumetanide (BUMEX) 2 MG tablet Take 1 tablet by mouth daily 30 tablet 5    levothyroxine (SYNTHROID) 100 MCG tablet Take 1 tablet by mouth Daily 90 tablet 3    pantoprazole sodium (PROTONIX) 40 MG PACK packet Take 1 packet by mouth every morning (before breakfast) 30 each 3    tiotropium (SPIRIVA HANDIHALER) 18 MCG inhalation capsule Inhale 1 capsule into the lungs daily 90 capsule 1    ferrous gluconate (FERGON) 240 (27 Fe) MG tablet Take 1 tablet by mouth 3 times daily (with meals) 270 tablet 2    vitamin D (ERGOCALCIFEROL) 41336 units CAPS capsule Take 1 capsule by mouth Twice a Week 12 capsule 1    aspirin 81 MG tablet Take 81 mg by mouth daily      diclofenac sodium (VOLTAREN) 1 % GEL Apply 2 g topically 2 times daily 3 Tube 3    calcipotriene (DOVONEX) 0.005 % cream Use topically as needed 3 Tube 3    clobetasol (TEMOVATE) 0.05 % cream Apply topically 2 times daily. 3 Tube 3        Medications patient taking as of now reconciled against medications ordered at time of hospital discharge: Yes    Chief Complaint   Patient presents with   [address] from Hospital     Patient was admitted to [facility]. She is still having pain in her right hip area. She was not given a diagnosis at discharge.  Hypertension     Patient was told not to take her potassium or clonidine. She is also holding her Coumadin due to an elevated INR. HPI    Inpatient course: Discharge summary reviewed- see chart. Interval history/Current status: Ms. Kaycee Fontanez presents to the office today for hospital follow-up. She was admitted to the hospital with complaints of abdominal pain.   A CT of joint swelling, myalgias, neck pain and neck stiffness. She had bilateral knee pain   Skin: Negative for color change, pallor, rash and wound. Allergic/Immunologic: Positive for environmental allergies. Negative for food allergies and immunocompromised state. Neurological: Negative for dizziness, tremors, seizures, syncope, facial asymmetry, speech difficulty, weakness, light-headedness, numbness and headaches. Hematological: Negative for adenopathy. Does not bruise/bleed easily. Psychiatric/Behavioral: Positive for dysphoric mood. Negative for agitation, confusion, decreased concentration and hallucinations. The patient is not nervous/anxious and is not hyperactive. Vitals:    01/31/20 1111   BP: (!) 217/133   Pulse: 76   SpO2: 97%   Weight: 233 lb (105.7 kg)   Height: 5' 7\" (1.702 m)     Body mass index is 36.49 kg/m². Wt Readings from Last 3 Encounters:   01/31/20 233 lb (105.7 kg)   01/25/20 252 lb (114.3 kg)   01/24/20 252 lb (114.3 kg)     BP Readings from Last 3 Encounters:   01/31/20 (!) 217/133   01/28/20 (!) 142/67   01/24/20 106/72       Physical Exam  Vitals signs and nursing note reviewed. Constitutional:       General: She is not in acute distress. Appearance: Normal appearance. She is well-developed. She is obese. She is not ill-appearing, toxic-appearing or diaphoretic. HENT:      Head: Normocephalic and atraumatic. Right Ear: Tympanic membrane, ear canal and external ear normal. There is no impacted cerumen. Left Ear: Tympanic membrane, ear canal and external ear normal.      Nose: Nose normal. No congestion or rhinorrhea. Mouth/Throat:      Mouth: Mucous membranes are moist.      Pharynx: Oropharynx is clear. No oropharyngeal exudate or posterior oropharyngeal erythema. Eyes:      General: No scleral icterus. Right eye: No discharge. Left eye: No discharge. Extraocular Movements: Extraocular movements intact.

## 2020-02-03 ENCOUNTER — OFFICE VISIT (OUTPATIENT)
Dept: INTERNAL MEDICINE | Age: 70
End: 2020-02-03
Payer: MEDICARE

## 2020-02-03 ENCOUNTER — HOSPITAL ENCOUNTER (OUTPATIENT)
Dept: GENERAL RADIOLOGY | Age: 70
Discharge: HOME | End: 2020-02-03
Payer: MEDICARE

## 2020-02-03 VITALS
SYSTOLIC BLOOD PRESSURE: 182 MMHG | OXYGEN SATURATION: 96 % | DIASTOLIC BLOOD PRESSURE: 111 MMHG | RESPIRATION RATE: 22 BRPM | WEIGHT: 233 LBS | HEART RATE: 90 BPM | HEIGHT: 67 IN | BODY MASS INDEX: 36.57 KG/M2

## 2020-02-03 PROCEDURE — 1111F DSCHRG MED/CURRENT MED MERGE: CPT | Performed by: INTERNAL MEDICINE

## 2020-02-03 PROCEDURE — 73502 X-RAY EXAM HIP UNI 2-3 VIEWS: CPT

## 2020-02-03 PROCEDURE — 1123F ACP DISCUSS/DSCN MKR DOCD: CPT | Performed by: INTERNAL MEDICINE

## 2020-02-03 PROCEDURE — 1036F TOBACCO NON-USER: CPT | Performed by: INTERNAL MEDICINE

## 2020-02-03 PROCEDURE — 4040F PNEUMOC VAC/ADMIN/RCVD: CPT | Performed by: INTERNAL MEDICINE

## 2020-02-03 PROCEDURE — G8482 FLU IMMUNIZE ORDER/ADMIN: HCPCS | Performed by: INTERNAL MEDICINE

## 2020-02-03 PROCEDURE — G8417 CALC BMI ABV UP PARAM F/U: HCPCS | Performed by: INTERNAL MEDICINE

## 2020-02-03 PROCEDURE — G8427 DOCREV CUR MEDS BY ELIG CLIN: HCPCS | Performed by: INTERNAL MEDICINE

## 2020-02-03 PROCEDURE — 99213 OFFICE O/P EST LOW 20 MIN: CPT | Performed by: INTERNAL MEDICINE

## 2020-02-03 PROCEDURE — 1090F PRES/ABSN URINE INCON ASSESS: CPT | Performed by: INTERNAL MEDICINE

## 2020-02-03 PROCEDURE — G8400 PT W/DXA NO RESULTS DOC: HCPCS | Performed by: INTERNAL MEDICINE

## 2020-02-03 PROCEDURE — 3017F COLORECTAL CA SCREEN DOC REV: CPT | Performed by: INTERNAL MEDICINE

## 2020-02-03 ASSESSMENT — ENCOUNTER SYMPTOMS
CHEST TIGHTNESS: 0
VOICE CHANGE: 0
SINUS PRESSURE: 0
BLOOD IN STOOL: 0
DIARRHEA: 0
ABDOMINAL PAIN: 0
NAUSEA: 0
SHORTNESS OF BREATH: 0
TROUBLE SWALLOWING: 0
VOMITING: 0
SORE THROAT: 0
ABDOMINAL DISTENTION: 0
WHEEZING: 0
EYE ITCHING: 0
RHINORRHEA: 0
CONSTIPATION: 0
EYE DISCHARGE: 0
COLOR CHANGE: 0
STRIDOR: 0
SINUS PAIN: 0
COUGH: 0
APNEA: 0
BACK PAIN: 1

## 2020-02-03 NOTE — PROGRESS NOTES
Chief Complaint   Patient presents with    Abdominal Pain     She states the pain isn't any better.  Anticoagulation     INR 1.3       HPI: Beltran Pena is a 78 y.o. female is here for follow-up of abdominal pain. She was recently admitted to the hospital for abdominal pain. She was found to have duodenitis. We last saw her for hospital follow-up, she had not been taking her Cipro her Flagyl. She did start taking her medication after she left the hospital.  Her abdominal pain is better. However, her blood pressure is quite elevated today. She has not taken her blood pressure medication. She is also not been taking her Lyrica does complain of right hip and right sciatica pain. Her caregiver would like for her to be started on Eliquis, as she is not compliant with Coumadin therapy. I am not sure she will be compliant with Eliquis either. However, we can get this covered by her insurance, we can certainly try it. She would also like her to put up NSAID for the hip pain.     Past Medical History:   Diagnosis Date    Abdominal pain     Abnormal EKG     Acute sinusitis     Allergic reaction to spider bite     Anemia     Anticoagulated     Anxiety     Arrhythmia     Asthmatic bronchitis without complication 4/40/8379    Ataxic gait     Lyons's esophagus     Bowel obstruction (HCC)     Callus     Cardiac pacemaker     CKD (chronic kidney disease) stage 2, GFR 60-89 ml/min     Coat's syndrome     Coat's syndrome     both eyes    Depression     Diabetes mellitus type 2 in nonobese (HCC)     Diabetic nephropathy (HCC)     Disequilibrium     Dizziness     DVT (deep venous thrombosis) (HCC)     Exudative retinopathy     Fibromyalgia     Fibromyositis     Gastric ulcer     GERD (gastroesophageal reflux disease)     History of gastric bypass     Hx of lupus anticoagulant disorder     Hypertension     Hypothyroidism     Intermittent claudication (HCC)     Intestinal obstruction ([address]     Iron deficiency     Left-sided weakness     Low vitamin D level     Lupus (HCC)     Menopause     Obesity     Osteoarthritis     Osteoporosis     Other iron deficiency anemias     Pernicious anemia     PUPP (pruritic urticarial papules and plaques of pregnancy)     Right leg numbness     Right sided sciatica     Sarcoidosis     with liver involvement    Sciatica     Secondary hyperparathyroidism ([address]     Shingles     Shortness of breath     Sleep apnea     Stomach ulcer     Syncope     Type II diabetes mellitus with nephropathy (HCC)     Visual loss, one eye       Past Surgical History:   Procedure Laterality Date    APPENDECTOMY      CARDIAC CATHETERIZATION  10/21/13  [facility]    EF over 60%    CHOLECYSTECTOMY      COLONOSCOPY  2/2010    negative    COLONOSCOPY  2/22/10    Dr Beltran [facility]    COLONOSCOPY  4/1/16    Dr LAKHWINDER Horvath-internal hemorrhoids, 5 yr recall    EYE SURGERY      Cyst on Right eye    EYE SURGERY      GASTRIC BYPASS SURGERY      GASTRIC BYPASS SURGERY      HERNIA REPAIR      HYSTERECTOMY      Complete    HYSTERECTOMY      Partial - because had a tubal pregnancy.      INCONTINENCE SURGERY      Bladder Sling    OTHER SURGICAL HISTORY      IVC filter    PACEMAKER INSERTION      PACEMAKER PLACEMENT      medtronic    NV TOTAL KNEE ARTHROPLASTY Right 3/26/2018    TOTAL KNEE REPLACEMENT COMPLEX PRIMARY performed by Austyn Sharp MD at [address] [place] [address]      SMALL INTESTINE SURGERY      SPLENECTOMY      KRITSEL AND BSO      TONSILLECTOMY AND ADENOIDECTOMY      UPPER GASTROINTESTINAL ENDOSCOPY  12/2011    gerd s/p gastric bypass    UPPER GASTROINTESTINAL ENDOSCOPY  2/2014    normal s/p gastric bypass    UPPER GASTROINTESTINAL ENDOSCOPY  2/2010    biopsy neg Barretts, chronic reflux esophagitis s/p gastric bypass    UPPER GASTROINTESTINAL ENDOSCOPY  7/2006    unremarkable s/p gastric bypass    UPPER GASTROINTESTINAL ENDOSCOPY  8/10/15     change, appetite change, chills, diaphoresis, fever and unexpected weight change. HENT: Negative for congestion, ear pain, hearing loss, nosebleeds, postnasal drip, rhinorrhea, sinus pressure, sinus pain, sneezing, sore throat, tinnitus, trouble swallowing and voice change. Eyes: Negative for discharge and itching. Watery eyes   Respiratory: Negative for apnea, cough, chest tightness, shortness of breath, wheezing and stridor. Cardiovascular: Positive for leg swelling. Negative for chest pain and palpitations. Left leg swelling; chronic secondary to DVT. Swelling to her bilateral lower extremities   Gastrointestinal: Negative for abdominal distention, abdominal pain, blood in stool, constipation, diarrhea, nausea and vomiting. Endocrine: Negative for cold intolerance, heat intolerance, polydipsia, polyphagia and polyuria. Genitourinary: Negative for difficulty urinating, dysuria, flank pain, frequency, hematuria and urgency. Musculoskeletal: Positive for arthralgias and back pain. Negative for gait problem, joint swelling, myalgias, neck pain and neck stiffness. She had bilateral knee pain. Right hip pain   Skin: Negative for color change, pallor, rash and wound. Allergic/Immunologic: Positive for environmental allergies. Negative for food allergies and immunocompromised state. Neurological: Negative for dizziness, tremors, seizures, syncope, facial asymmetry, speech difficulty, weakness, light-headedness, numbness and headaches. Hematological: Negative for adenopathy. Does not bruise/bleed easily. Psychiatric/Behavioral: Positive for dysphoric mood. Negative for agitation, confusion, decreased concentration and hallucinations. The patient is not nervous/anxious and is not hyperactive.         BP (!) 182/111 (Site: Right Upper Arm, Position: Sitting)   Pulse 90   Resp 22   Ht 5' 7\" (1.702 m)   Wt 233 lb (105.7 kg)   SpO2 96%   BMI 36.49 kg/m²   Physical Exam  Vitals signs and nursing note reviewed. Constitutional:       General: She is not in acute distress. Appearance: Normal appearance. She is well-developed. She is obese. She is not ill-appearing, toxic-appearing or diaphoretic. HENT:      Head: Normocephalic and atraumatic. Right Ear: Tympanic membrane, ear canal and external ear normal. There is no impacted cerumen. Left Ear: Tympanic membrane, ear canal and external ear normal.      Nose: Nose normal. No congestion or rhinorrhea. Mouth/Throat:      Mouth: Mucous membranes are moist.      Pharynx: Oropharynx is clear. No oropharyngeal exudate or posterior oropharyngeal erythema. Eyes:      General: No scleral icterus. Right eye: No discharge. Left eye: No discharge. Extraocular Movements: Extraocular movements intact. Conjunctiva/sclera: Conjunctivae normal.      Pupils: Pupils are equal, round, and reactive to light. Neck:      Musculoskeletal: Normal range of motion. No neck rigidity or muscular tenderness. Thyroid: No thyromegaly. Vascular: No carotid bruit or JVD. Trachea: No tracheal deviation. Cardiovascular:      Rate and Rhythm: Normal rate and regular rhythm. Pulses: Normal pulses. Heart sounds: Normal heart sounds. No murmur. No friction rub. No gallop. Pulmonary:      Effort: Pulmonary effort is normal. No respiratory distress. Breath sounds: Normal breath sounds. No stridor. No wheezing, rhonchi or rales. Chest:      Chest wall: No tenderness. Abdominal:      General: Abdomen is flat. Bowel sounds are normal. There is no distension. Palpations: Abdomen is soft. There is no mass. Tenderness: There is abdominal tenderness. There is no right CVA tenderness, left CVA tenderness, guarding or rebound. Hernia: No hernia is present. Comments: Right lower  and right upper quadrant tenderness improved, but still present   Musculoskeletal: Normal range of motion. General: Tenderness present. No swelling, deformity or signs of injury. Lumbar back: She exhibits pain and spasm. She exhibits normal range of motion, no bony tenderness and no swelling. Back:       Right lower leg: No edema. Left lower leg: No edema. Comments: Swelling to the lower extremities. Does have some hip pain on palpation of the right hip   Lymphadenopathy:      Cervical: No cervical adenopathy. Skin:     General: Skin is warm and dry. Capillary Refill: Capillary refill takes less than 2 seconds. Coloration: Skin is not jaundiced or pale. Findings: No bruising, erythema, lesion or rash. Neurological:      General: No focal deficit present. Mental Status: She is alert. Mental status is at baseline. She is disoriented. Cranial Nerves: No cranial nerve deficit. Sensory: No sensory deficit. Motor: No weakness or abnormal muscle tone. Coordination: Coordination normal.      Gait: Gait normal.      Deep Tendon Reflexes: Reflexes normal.   Psychiatric:         Mood and Affect: Mood normal. Mood is not anxious or depressed. Behavior: Behavior normal.         Thought Content: Thought content normal.         Judgment: Judgment normal.         1. Chronic deep vein thrombosis (DVT) of proximal vein of left lower extremity ([address]    2. Right hip pain        ASSESSMENT/PLAN:    65-year-old woman here for follow-up    1. Abdominal pain: Improved. Patient is now actually taking her antibiotics    2. History of chronic DVT: INR 1.6 today. We can try Eliquis if her insurance will cover it    3. Right hip pain: X-ray ordered. She does have some sciatica. Tried taking her Lyrica as prescribed. She actually has it at home    4. Hypertension: Called to assess as patient has not taken her blood pressure medicine    Viviana Erickson was seen today for abdominal pain and anticoagulation.     Diagnoses and all orders for this visit:    Chronic deep vein

## 2020-02-03 NOTE — PATIENT INSTRUCTIONS
also be used for purposes not listed in this medication guide. What should I discuss with my healthcare provider before taking apixaban? You should not take apixaban if you are allergic to it, or if you have active bleeding from a surgery, injury, or other cause. Apixaban may cause you to bleed more easily, especially if you have a bleeding disorder that is inherited or caused by disease. Tell your doctor if you have an artificial heart valve, or if you have ever had:  · liver or kidney disease;  · if you are older than 80; or  · if you weigh less than 132 pounds (60 kilograms). Apixaban can cause a very serious blood clot around your spinal cord if you undergo a spinal tap or receive spinal anesthesia (epidural). This type of blood clot could cause long-term paralysis, and may be more likely to occur if:   · you have a spinal catheter in place or if a catheter has been recently removed;  · you have a history of spinal surgery or repeated spinal taps;  · you have recently had a spinal tap or epidural anesthesia;  · you are taking an NSAID (nonsteroidal anti-inflammatory drug)--aspirin, ibuprofen (Advil, Motrin), naproxen (Aleve), diclofenac, indomethacin, meloxicam, and others; or  · you are using other medicines to treat or prevent blood clots. Taking apixaban may increase the risk of bleeding while you are pregnant or during your delivery. Tell your doctor if you are pregnant or plan to become pregnant. You should not breast-feed while using this medicine. How should I take apixaban? Follow all directions on your prescription label and read all medication guides or instruction sheets. Your doctor may occasionally change your dose. Use the medicine exactly as directed. You may take apixaban with or without food. If you cannot swallow a tablet whole, crush and mix it with water, apple juice, or a spoonful of applesauce. Swallow the mixture right away without chewing. Do not save it for later use.   A crushed tablet mixture may also be given through a nasogastric (NG) feeding tube. Read and carefully follow any Instructions for Use provided with your medicine. Ask your doctor or pharmacist if you do not understand these instructions. Apixaban can make it easier for you to bleed, even from a minor injury. Seek medical attention if you have bleeding that will not stop. If you need surgery or dental work, tell the doctor or dentist ahead of time if you have taken apixaban within the past 24 hours. You may need to stop taking apixaban for a short time. Do not stop taking apixaban unless your doctor tells you to. Stopping suddenly can increase your risk of blood clot or stroke. If you stop taking apixaban for any reason, your doctor may prescribe another medication to prevent blood clots until you start taking apixaban again. Store at room temperature away from moisture and heat. What happens if I miss a dose? Take the missed dose on the same day you remember it. Take your next dose at the regular time and stay on your twice-daily schedule. Do not take two doses at one time. Get your prescription refilled before you run out of medicine completely. What happens if I overdose? Seek emergency medical attention or call the Poison Help line at 1-683.774.5871. What should I avoid while taking apixaban? Avoid activities that may increase your risk of bleeding or injury. Use extra care to prevent bleeding while shaving or brushing your teeth. What are the possible side effects of apixaban? Get emergency medical help if you have signs of an allergic reaction: hives; chest pain, wheezing, difficult breathing; feeling light-headed; swelling of your face, lips, tongue, or throat. Also seek emergency medical attention if you have symptoms of a spinal blood clot: back pain, numbness or muscle weakness in your lower body, or loss of bladder or bowel control.   Call your doctor at once if you have:  · easy bruising, unusual bleeding (nose, mouth, vagina, or rectum), bleeding from wounds or needle injections, any bleeding that will not stop;  · heavy menstrual periods;  · headache, dizziness, weakness, feeling like you might pass out;  · urine that looks red, pink, or brown; or  · black or bloody stools, coughing up blood or vomit that looks like coffee grounds. This is not a complete list of side effects and others may occur. Call your doctor for medical advice about side effects. You may report side effects to FDA at 1-510-RSB-1343. What other drugs will affect apixaban? Sometimes it is not safe to use certain medications at the same time. Some drugs can affect your blood levels of other drugs you take, which may increase side effects or make the medications less effective. Many other drugs (including some over-the-counter medicines) can increase your risk of bleeding or blood clots, or your risk of developing blood clots around the brain or spinal cord during a spinal tap or epidural. It is very important to tell your doctor about all medicines you have recently used, especially:  · any other medicines to treat or prevent blood clots;  · a blood thinner such as heparin or warfarin (Coumadin, Jantoven);  · an antidepressant; or  · an NSAID (nonsteroidal anti-inflammatory drug) used long term. This list is not complete and many other drugs may affect apixaban. This includes prescription and over-the-counter medicines, vitamins, and herbal products. Not all possible drug interactions are listed here. Where can I get more information? Your pharmacist can provide more information about apixaban. Remember, keep this and all other medicines out of the reach of children, never share your medicines with others, and use this medication only for the indication prescribed.   Every effort has been made to ensure that the information provided by [address] is accurate, up-to-date, and complete, but no guarantee is made to that effect. Drug information contained herein may be time sensitive. [employer] information has been compiled for use by healthcare practitioners and consumers in the [place] and therefore [employer] does not warrant that uses outside of the [place] are appropriate, unless specifically indicated otherwise. [facility]'s drug information does not endorse drugs, diagnose patients or recommend therapy. [facility] drug information is an informational resource designed to assist licensed healthcare practitioners in caring for their patients and/or to serve consumers viewing this service as a supplement to, and not a substitute for, the expertise, skill, knowledge and judgment of healthcare practitioners. The absence of a warning for a given drug or drug combination in no way should be construed to indicate that the drug or drug combination is safe, effective or appropriate for any given patient. [facility] does not assume any responsibility for any aspect of healthcare administered with the aid of information [facility] provides. The information contained herein is not intended to cover all possible uses, directions, precautions, warnings, drug interactions, allergic reactions, or adverse effects. If you have questions about the drugs you are taking, check with your doctor, nurse or pharmacist.  Copyright 3247-1295 [address]. Version: 4.01. Revision date: 6/21/2019. Care instructions adapted under license by [facility] ([facility]). If you have questions about a medical condition or this instruction, always ask your healthcare professional. [place] [address] any warranty or liability for your use of this information.

## 2020-02-04 ENCOUNTER — CARE COORDINATION (OUTPATIENT)
Dept: CASE MANAGEMENT | Age: 70
End: 2020-02-04

## 2020-02-04 NOTE — CARE COORDINATION
Department Center   3/6/2020 11:15 AM Gisele Chong MD LPS [facility]   3/20/2020 10:30 AM CHANELLE Roque [facility]   3/20/2020 10:50 AM SCHEDULE, L MED ONC MA L MED ONC Shannan Bowman, SAWYER

## 2020-02-07 ENCOUNTER — CARE COORDINATION (OUTPATIENT)
Dept: CASE MANAGEMENT | Age: 70
End: 2020-02-07

## 2020-02-07 ENCOUNTER — TELEPHONE (OUTPATIENT)
Dept: INTERNAL MEDICINE | Age: 70
End: 2020-02-07

## 2020-02-07 NOTE — TELEPHONE ENCOUNTER
[facility], case management with , left message stating pt still c/o of leg pain and heaviness. Pt told her that her sister has an appt today and couldn't bring her and she has nephews that could possibly take her to be seen but they were not reliable. Pt refuses to call EMS b/c she doesn't want to pay a huge bill. Pt is aware of the dangers of letting this go on without being evaluated because the last time she let it go on for too long pt wound up with a blood clot the entire length of her leg. Pt was instructed to go to Urgent Care last night. I sent her a My Chart message asking if she went, but she responded by saying \"No\". I responded by asking if she planned on going anywhere today to be evaluated, but she never responded. Please advise.

## 2020-02-07 NOTE — TELEPHONE ENCOUNTER
I spoke with Daniel Mckay at [facility] and informed her of this. She understands the pt is non compliant as well. I called the pt and spoke with her and advised her that Dr. Marco Donovan says she needs to be seen today. Pt told me to hold on, and I could here her talking to someone else. When she came back on the phone I asked if she was at work. She said she was at work and was even having trouble finding someone to pick her up from work. I stressed the importance of being seen today and the danger of waiting any longer. She verbalized understanding and said she would go somewhere this weekend to have it seen about if she continues to have problems with it because she doesn't want to lose her leg.

## 2020-02-07 NOTE — TELEPHONE ENCOUNTER
She needs to be seen. I can't evaluate this over the phone.  If she can't get here today, go to Urgent Care tonight or tomorrow

## 2020-02-10 ENCOUNTER — CARE COORDINATION (OUTPATIENT)
Dept: CASE MANAGEMENT | Age: 70
End: 2020-02-10

## 2020-02-10 NOTE — CARE COORDINATION
[facility] Transitions Follow Up Call    2/10/2020    Patient: Sarbjit Franco  Patient : 1950   MRN: [id number]  Reason for Admission:   Discharge Date: 20 RARS: Readmission Risk Score: 23         Spoke with: Scottie Jama Transitions Subsequent and Final Call    Subsequent and Final Calls  Have your medications changed?:  No  Do you have any questions related to your medications?:  No  Do you currently have any active services?:  No  Do you have any needs or concerns that I can assist you with?:  Yes (Comment: see CC note)  Patient-reported Needs or Concerns:  see CC note  Identified Barriers:  None  Care Transitions Interventions                          Other Interventions: Follow Up : Spoke with patient today for follow up phone call. She says she is still having pain in her leg, she has taken gabapentin and it did not help much, she is still having some effects from it that she does not elaborate on. She again says she is not going to see DR. Curtis, does not have a ride. She says she works at [employer] and works 8-5 every day. [facility]N asked her how she gets to work and she says her sister in law brings her when she takes the grandchildren to work. Patient refuses to call EMS or a cab for a ride. She says she is aware that this could potentially be life threatening; however, she refuses to seek medical attention at this time. Dr. Ayana Manzano and staff is aware of this situation, and she states she cannot treat her over the phone, she needs to be seen and assessed. Patient continues to refuse any course of action to get her there during office hours, or to be seen in ED. Will discharge from [facility]N services at this time.   Future Appointments   Date Time Provider Jose Shea   3/6/2020 11:15 AM MD HIPOLITO Godfrey [facility]   3/20/2020 10:30 AM CHANELLE Hayes [facility]   3/20/2020 10:50 AM SCHEDULE, [facility] ONC MA [facility] Shannan Mcneill RN

## 2020-02-12 ENCOUNTER — DOCUMENTATION (OUTPATIENT)
Dept: CARDIOLOGY | Facility: CLINIC | Age: 70
End: 2020-02-12

## 2020-02-12 DIAGNOSIS — Z95.0 PACEMAKER: Primary | ICD-10-CM

## 2020-02-12 NOTE — PROGRESS NOTES
Dual Chamber Pacemaker Evaluation Report  REMOTE/CARELINK    February 12, 2020    Primary Cardiologist: Mynor  : Medtronic Model: EnRhythm [id number]  Implant date: 11/13/09    Reason for evaluation: battery check  Indication for pacemaker: tachy-redd syndrome    Measurements  Atrial sensing - P wave: 2.3 mV  Atrial threshold: N/P  Atrial lead impedance: 536 ohms  Ventricular sensing - R wave: 3.7 mV  Ventricular threshold: N/P  Ventricular lead impedance:   448 ohms     Diagnostic Data  Atrial paced: 31.5 %  Ventricular paced: 35.1 %    Episodes recorded since 12/19/2019:  P-AT/AF on cardiac compass, burden <0.1%.  Anticoagulated with coumadin.  SVT x 4 up to 7 seconds duration.  Rates 158-167 bpm.    Battery status: elective replacement time   ELKIN=2.81V on 08-JAN-2020      Final Parameters  Mode:  VVI (was AAIR <=> DDDR 60/130 prior to reaching ELKIN)  Lower rate: 65 bpm       Ventricular - Amplitude: 2 V  Pulse width: 0.4 ms  Sensitivity: 0.9 mV    Changes made: No changes made  Conclusions: battery at elective replacement voltage    Follow up: 6 weeks post generator change     Leads:  RA lead is 4076 (45 cm), RV lead is 4076 (52 cm).  Both are MRI conditional.

## 2020-02-17 ENCOUNTER — PREP FOR SURGERY (OUTPATIENT)
Dept: OTHER | Facility: HOSPITAL | Age: 70
End: 2020-02-17

## 2020-02-17 ENCOUNTER — TELEPHONE (OUTPATIENT)
Dept: INTERNAL MEDICINE | Age: 70
End: 2020-02-17

## 2020-02-17 DIAGNOSIS — Z95.0 PACEMAKER: Primary | ICD-10-CM

## 2020-02-17 RX ORDER — SODIUM CHLORIDE 0.9 % (FLUSH) 0.9 %
3 SYRINGE (ML) INJECTION EVERY 12 HOURS SCHEDULED
Status: CANCELLED | OUTPATIENT
Start: 2020-02-17

## 2020-02-17 RX ORDER — ONDANSETRON 2 MG/ML
4 INJECTION INTRAMUSCULAR; INTRAVENOUS EVERY 6 HOURS PRN
Status: CANCELLED | OUTPATIENT
Start: 2020-02-17

## 2020-02-17 RX ORDER — SODIUM CHLORIDE 0.9 % (FLUSH) 0.9 %
10 SYRINGE (ML) INJECTION AS NEEDED
Status: CANCELLED | OUTPATIENT
Start: 2020-02-17

## 2020-02-17 RX ORDER — VANCOMYCIN HYDROCHLORIDE 1 G/200ML
1000 INJECTION, SOLUTION INTRAVENOUS ONCE
Status: CANCELLED | OUTPATIENT
Start: 2020-02-17 | End: 2020-02-17

## 2020-02-17 NOTE — TELEPHONE ENCOUNTER
Received a fax from [id number] [facility] saying that [address] isn't covered. They recommend the following oral medications: Celebrex, Diclofenac,  Meloxicam, Naproxen, and Ibuprofen. Please advise.

## 2020-02-18 NOTE — TELEPHONE ENCOUNTER
[phone]. I spoke with Desire she said it would need a PA if she is going to continue this after April. PA done over the phone. Medication has been approved 2/21/21.  Reference # [id number]

## 2020-02-24 PROBLEM — E86.0 DEHYDRATION: Status: RESOLVED | Noted: 2020-01-25 | Resolved: 2020-02-24

## 2020-02-26 ENCOUNTER — HOSPITAL ENCOUNTER (OUTPATIENT)
Facility: HOSPITAL | Age: 70
Setting detail: HOSPITAL OUTPATIENT SURGERY
Discharge: HOME | End: 2020-02-26
Attending: INTERNAL MEDICINE | Admitting: INTERNAL MEDICINE

## 2020-02-26 VITALS
HEART RATE: 75 BPM | TEMPERATURE: 97.9 F | RESPIRATION RATE: 18 BRPM | OXYGEN SATURATION: 98 % | SYSTOLIC BLOOD PRESSURE: 182 MMHG | DIASTOLIC BLOOD PRESSURE: 98 MMHG | WEIGHT: 236.38 LBS | BODY MASS INDEX: 37.1 KG/M2 | HEIGHT: 67 IN

## 2020-02-26 DIAGNOSIS — Z95.0 PACEMAKER: ICD-10-CM

## 2020-02-26 LAB
DEPRECATED RDW RBC AUTO: 51.6 FL (ref 37–54)
ERYTHROCYTE [DISTWIDTH] IN BLOOD BY AUTOMATED COUNT: 16.2 % (ref 12.3–15.4)
HCT VFR BLD AUTO: 38 % (ref 34–46.6)
HGB BLD-MCNC: 12.4 G/DL (ref 12–15.9)
INR PPP: 1.03 (ref 0.91–1.09)
MCH RBC QN AUTO: 28.4 PG (ref 26.6–33)
MCHC RBC AUTO-ENTMCNC: 32.6 G/DL (ref 31.5–35.7)
MCV RBC AUTO: 87.2 FL (ref 79–97)
PLATELET # BLD AUTO: 185 10*3/MM3 (ref 140–450)
PROTHROMBIN TIME: 13.4 SECONDS (ref 11.9–14.6)
RBC # BLD AUTO: 4.36 10*6/MM3 (ref 3.77–5.28)
WBC NRBC COR # BLD: 5.64 10*3/MM3 (ref 3.4–10.8)

## 2020-02-26 PROCEDURE — S0260 H&P FOR SURGERY: HCPCS | Performed by: INTERNAL MEDICINE

## 2020-02-26 PROCEDURE — 93005 ELECTROCARDIOGRAM TRACING: CPT | Performed by: INTERNAL MEDICINE

## 2020-02-26 PROCEDURE — 33228 REMV&REPLC PM GEN DUAL LEAD: CPT | Performed by: INTERNAL MEDICINE

## 2020-02-26 PROCEDURE — 25010000002 MIDAZOLAM PER 1 MG: Performed by: INTERNAL MEDICINE

## 2020-02-26 PROCEDURE — 85610 PROTHROMBIN TIME: CPT | Performed by: INTERNAL MEDICINE

## 2020-02-26 PROCEDURE — 85027 COMPLETE CBC AUTOMATED: CPT | Performed by: INTERNAL MEDICINE

## 2020-02-26 PROCEDURE — C1785 PMKR, DUAL, RATE-RESP: HCPCS | Performed by: INTERNAL MEDICINE

## 2020-02-26 PROCEDURE — 93010 ELECTROCARDIOGRAM REPORT: CPT | Performed by: INTERNAL MEDICINE

## 2020-02-26 DEVICE — GEN PM ADVISA SURESCAN DR MRI: Type: IMPLANTABLE DEVICE | Status: FUNCTIONAL

## 2020-02-26 RX ORDER — SODIUM CHLORIDE 9 MG/ML
50 INJECTION, SOLUTION INTRAVENOUS CONTINUOUS
Status: DISCONTINUED | OUTPATIENT
Start: 2020-02-26 | End: 2020-02-26 | Stop reason: HOSPADM

## 2020-02-26 RX ORDER — MULTIPLE VITAMINS W/ MINERALS TAB 9MG-400MCG
1 TAB ORAL DAILY
COMMUNITY

## 2020-02-26 RX ORDER — LISINOPRIL 40 MG/1
40 TABLET ORAL DAILY
COMMUNITY

## 2020-02-26 RX ORDER — SODIUM CHLORIDE 0.9 % (FLUSH) 0.9 %
10 SYRINGE (ML) INJECTION AS NEEDED
Status: DISCONTINUED | OUTPATIENT
Start: 2020-02-26 | End: 2020-02-26 | Stop reason: HOSPADM

## 2020-02-26 RX ORDER — SODIUM CHLORIDE 0.9 % (FLUSH) 0.9 %
3 SYRINGE (ML) INJECTION EVERY 12 HOURS SCHEDULED
Status: DISCONTINUED | OUTPATIENT
Start: 2020-02-26 | End: 2020-02-26 | Stop reason: HOSPADM

## 2020-02-26 RX ORDER — ERGOCALCIFEROL 1.25 MG/1
50000 CAPSULE ORAL 2 TIMES WEEKLY
COMMUNITY
End: 2022-08-29

## 2020-02-26 RX ORDER — CEPHALEXIN 500 MG/1
500 CAPSULE ORAL 4 TIMES DAILY
Qty: 4 CAPSULE | Refills: 0 | Status: SHIPPED | OUTPATIENT
Start: 2020-02-26 | End: 2020-02-27

## 2020-02-26 RX ORDER — POLYETHYLENE GLYCOL 3350 17 G/17G
17 POWDER, FOR SOLUTION ORAL AS NEEDED
COMMUNITY
End: 2022-08-29

## 2020-02-26 RX ORDER — ONDANSETRON 4 MG/1
4 TABLET, FILM COATED ORAL DAILY PRN
COMMUNITY

## 2020-02-26 RX ORDER — ONDANSETRON 2 MG/ML
4 INJECTION INTRAMUSCULAR; INTRAVENOUS EVERY 6 HOURS PRN
Status: DISCONTINUED | OUTPATIENT
Start: 2020-02-26 | End: 2020-02-26 | Stop reason: HOSPADM

## 2020-02-26 RX ORDER — MIDAZOLAM HYDROCHLORIDE 1 MG/ML
INJECTION INTRAMUSCULAR; INTRAVENOUS AS NEEDED
Status: DISCONTINUED | OUTPATIENT
Start: 2020-02-26 | End: 2020-02-26 | Stop reason: HOSPADM

## 2020-02-26 RX ADMIN — VANCOMYCIN HYDROCHLORIDE 1000 MG: 1 INJECTION, POWDER, LYOPHILIZED, FOR SOLUTION INTRAVENOUS at 10:15

## 2020-02-26 RX ADMIN — SODIUM CHLORIDE 50 ML/HR: 9 INJECTION, SOLUTION INTRAVENOUS at 09:43

## 2020-03-05 DIAGNOSIS — E55.9 VITAMIN D DEFICIENCY: ICD-10-CM

## 2020-03-05 DIAGNOSIS — E11.21 TYPE II DIABETES MELLITUS WITH NEPHROPATHY (HCC): ICD-10-CM

## 2020-03-05 LAB
ALBUMIN SERPL-MCNC: 4.1 G/DL (ref 3.5–5.2)
ALP BLD-CCNC: 81 U/L (ref 35–104)
ALT SERPL-CCNC: 7 U/L (ref 5–33)
ANION GAP SERPL CALCULATED.3IONS-SCNC: 12 MMOL/L (ref 7–19)
AST SERPL-CCNC: 19 U/L (ref 5–32)
BASOPHILS ABSOLUTE: 0.1 K/UL (ref 0–0.2)
BASOPHILS RELATIVE PERCENT: 0.9 % (ref 0–1)
BILIRUB SERPL-MCNC: 0.4 MG/DL (ref 0.2–1.2)
BUN BLDV-MCNC: 19 MG/DL (ref 8–23)
CALCIUM SERPL-MCNC: 9.2 MG/DL (ref 8.8–10.2)
CHLORIDE BLD-SCNC: 104 MMOL/L (ref 98–111)
CHOLESTEROL, TOTAL: 208 MG/DL (ref 160–199)
CO2: 24 MMOL/L (ref 22–29)
CREAT SERPL-MCNC: 1 MG/DL (ref 0.5–0.9)
CREATININE URINE: 60.8 MG/DL (ref 4.2–622)
EOSINOPHILS ABSOLUTE: 0.5 K/UL (ref 0–0.6)
EOSINOPHILS RELATIVE PERCENT: 8.7 % (ref 0–5)
GFR NON-AFRICAN AMERICAN: 55
GLUCOSE BLD-MCNC: 74 MG/DL (ref 74–109)
HBA1C MFR BLD: 5.7 % (ref 4–6)
HCT VFR BLD CALC: 36.8 % (ref 37–47)
HDLC SERPL-MCNC: 88 MG/DL (ref 65–121)
HEMOGLOBIN: 11.5 G/DL (ref 12–16)
IMMATURE GRANULOCYTES #: 0 K/UL
LDL CHOLESTEROL CALCULATED: 108 MG/DL
LYMPHOCYTES ABSOLUTE: 1.7 K/UL (ref 1.1–4.5)
LYMPHOCYTES RELATIVE PERCENT: 29.9 % (ref 20–40)
MCH RBC QN AUTO: 28.1 PG (ref 27–31)
MCHC RBC AUTO-ENTMCNC: 31.3 G/DL (ref 33–37)
MCV RBC AUTO: 90 FL (ref 81–99)
MICROALBUMIN UR-MCNC: 2.2 MG/DL (ref 0–19)
MICROALBUMIN/CREAT UR-RTO: 36.2 MG/G
MONOCYTES ABSOLUTE: 0.6 K/UL (ref 0–0.9)
MONOCYTES RELATIVE PERCENT: 11.1 % (ref 0–10)
NEUTROPHILS ABSOLUTE: 2.8 K/UL (ref 1.5–7.5)
NEUTROPHILS RELATIVE PERCENT: 49.2 % (ref 50–65)
PDW BLD-RTO: 17 % (ref 11.5–14.5)
PLATELET # BLD: 185 K/UL (ref 130–400)
PMV BLD AUTO: 12.9 FL (ref 9.4–12.3)
POTASSIUM SERPL-SCNC: 4.1 MMOL/L (ref 3.5–5)
RBC # BLD: 4.09 M/UL (ref 4.2–5.4)
SODIUM BLD-SCNC: 140 MMOL/L (ref 136–145)
TOTAL PROTEIN: 7.8 G/DL (ref 6.6–8.7)
TRIGL SERPL-MCNC: 60 MG/DL (ref 0–149)
TSH SERPL DL<=0.05 MIU/L-ACNC: 3.4 UIU/ML (ref 0.27–4.2)
VITAMIN B-12: 396 PG/ML (ref 211–946)
VITAMIN D 25-HYDROXY: 17.9 NG/ML
WBC # BLD: 5.8 K/UL (ref 4.8–10.8)

## 2020-03-06 ENCOUNTER — OFFICE VISIT (OUTPATIENT)
Dept: INTERNAL MEDICINE | Age: 70
End: 2020-03-06
Payer: MEDICARE

## 2020-03-06 VITALS
WEIGHT: 241 LBS | HEIGHT: 67 IN | BODY MASS INDEX: 37.83 KG/M2 | DIASTOLIC BLOOD PRESSURE: 90 MMHG | SYSTOLIC BLOOD PRESSURE: 156 MMHG | HEART RATE: 74 BPM | OXYGEN SATURATION: 99 %

## 2020-03-06 LAB
INTERNATIONAL NORMALIZATION RATIO, POC: 1
PROTHROMBIN TIME, POC: NORMAL

## 2020-03-06 PROCEDURE — G0439 PPPS, SUBSEQ VISIT: HCPCS | Performed by: INTERNAL MEDICINE

## 2020-03-06 PROCEDURE — 4040F PNEUMOC VAC/ADMIN/RCVD: CPT | Performed by: INTERNAL MEDICINE

## 2020-03-06 PROCEDURE — 3017F COLORECTAL CA SCREEN DOC REV: CPT | Performed by: INTERNAL MEDICINE

## 2020-03-06 PROCEDURE — G8482 FLU IMMUNIZE ORDER/ADMIN: HCPCS | Performed by: INTERNAL MEDICINE

## 2020-03-06 PROCEDURE — 85610 PROTHROMBIN TIME: CPT | Performed by: INTERNAL MEDICINE

## 2020-03-06 PROCEDURE — 1123F ACP DISCUSS/DSCN MKR DOCD: CPT | Performed by: INTERNAL MEDICINE

## 2020-03-06 PROCEDURE — 3044F HG A1C LEVEL LT 7.0%: CPT | Performed by: INTERNAL MEDICINE

## 2020-03-06 RX ORDER — BACLOFEN 10 MG/1
10 TABLET ORAL 3 TIMES DAILY
Qty: 10 TABLET | Refills: 1 | Status: SHIPPED | OUTPATIENT
Start: 2020-03-06 | End: 2020-03-16 | Stop reason: SDUPTHER

## 2020-03-06 RX ORDER — METHYLPREDNISOLONE 4 MG/1
TABLET ORAL
Qty: 1 KIT | Refills: 0 | Status: SHIPPED | OUTPATIENT
Start: 2020-03-06 | End: 2020-03-12

## 2020-03-06 ASSESSMENT — ENCOUNTER SYMPTOMS
SINUS PRESSURE: 0
SHORTNESS OF BREATH: 0
CHEST TIGHTNESS: 0
VOMITING: 0
RHINORRHEA: 0
EYE ITCHING: 0
TROUBLE SWALLOWING: 0
WHEEZING: 0
BLOOD IN STOOL: 0
SORE THROAT: 0
SINUS PAIN: 0
VOICE CHANGE: 0
STRIDOR: 0
DIARRHEA: 0
ABDOMINAL PAIN: 0
BACK PAIN: 1
EYE DISCHARGE: 0
CONSTIPATION: 0
NAUSEA: 0
APNEA: 0
COUGH: 0
ABDOMINAL DISTENTION: 0
COLOR CHANGE: 0

## 2020-03-06 ASSESSMENT — LIFESTYLE VARIABLES: HOW OFTEN DO YOU HAVE A DRINK CONTAINING ALCOHOL: 0

## 2020-03-06 ASSESSMENT — PATIENT HEALTH QUESTIONNAIRE - PHQ9
SUM OF ALL RESPONSES TO PHQ QUESTIONS 1-9: 0
SUM OF ALL RESPONSES TO PHQ QUESTIONS 1-9: 0

## 2020-03-06 NOTE — PROGRESS NOTES
Chief Complaint   Patient presents with    Medicare AW       HPI: Husam Hannah is a 78 y.o. female is here for her annual physical exam.  Her functional status is fair. She denies any history of falls. She has no concerns in regards to safety, hearing, or cognition. She does have a known history of noncompliance with her medications. This is been an ongoing issue for quite some time. Dr. Oscar Fenton put a pacemaker in her heart a week ago. She was not kept in the hospital overnight. She is doing well in regards to the pacemaker placement. Dr. Edna Quinn also recently did a sleep study. She is scheduled to see him in the near future. She sees Dr. Ava Tadeo for chronic kidney disease. Her kidney disease is stable. Her concern today is that she is having some left hip pain. At first she said it was her right hip pain, but is more or less her left hip pain now. She does not want to go to pain management. She is afraid to take pain medication. She denies any history of injury to the hip. She states that she walked a couple of blocks and was hurting so bad that she felt like she was going to pass out. She has not had an MRI of the hip. Her blood pressure is slightly high. She is taking her medications as prescribed. She states that her hip hurts so bad that is why her blood pressure is up. Her medications are working well for depression. She does have a history of diabetic neuropathy. She does take Lyrica for this and it does seem to help. Her thyroid function is stable. Her A1c is 5.7. Her acid reflux is well controlled. Her asthma is stable. Her vitamin D is a little bit low. She is been advised to take her vitamin D supplement weekly as scheduled. Her B12 level is within normal limits.     Routine screening is as follows:  Eye exam yearly  Flu vaccine up to date  Pap: Declined  Mammogram and DEXA ordered  Colonoscopy 2016, due 2021  DEXA ordered  Pneumovax up UTD    Past Medical History:   Diagnosis Date    Abdominal pain     Abnormal EKG     Acute sinusitis     Allergic reaction to spider bite     Anemia     Anticoagulated     Anxiety     Arrhythmia     Asthmatic bronchitis without complication 2/21/1806    Ataxic gait     Lyons's esophagus     Bowel obstruction (HCC)     Callus     Cardiac pacemaker     CKD (chronic kidney disease) stage 2, GFR 60-89 ml/min     Coat's syndrome     Coat's syndrome     both eyes    Depression     Diabetes mellitus type 2 in nonobese (HCC)     Diabetic nephropathy (HCC)     Disequilibrium     Dizziness     DVT (deep venous thrombosis) (HCC)     Exudative retinopathy     Fibromyalgia     Fibromyositis     Gastric ulcer     GERD (gastroesophageal reflux disease)     History of gastric bypass     Hx of lupus anticoagulant disorder     Hypertension     Hypothyroidism     Intermittent claudication (HCC)     Intestinal obstruction (HCC)     Iron deficiency     Left-sided weakness     Low vitamin D level     Lupus (HCC)     Menopause     Obesity     Osteoarthritis     Osteoporosis     Other iron deficiency anemias     Pernicious anemia     PUPP (pruritic urticarial papules and plaques of pregnancy)     Right leg numbness     Right sided sciatica     Sarcoidosis     with liver involvement    Sciatica     Secondary hyperparathyroidism ([address]     Shingles     Shortness of breath     Sleep apnea     Stomach ulcer     Syncope     Type II diabetes mellitus with nephropathy (HCC)     Visual loss, one eye       Past Surgical History:   Procedure Laterality Date    APPENDECTOMY      CARDIAC CATHETERIZATION  10/21/13  [facility]    EF over 60%    CHOLECYSTECTOMY      COLONOSCOPY  2/2010    negative    COLONOSCOPY  2/22/10    Dr Francisco Rowley    COLONOSCOPY  4/1/16    Dr LAKHWINDER Horvath-internal hemorrhoids, 5 yr recall    EYE SURGERY      Cyst on Right eye    EYE SURGERY      GASTRIC BYPASS SURGERY      GASTRIC BYPASS SURGERY      HERNIA REPAIR  HYSTERECTOMY      Complete    HYSTERECTOMY      Partial - because had a tubal pregnancy.      INCONTINENCE SURGERY      Bladder Sling    OTHER SURGICAL HISTORY      IVC filter    PACEMAKER INSERTION      PACEMAKER PLACEMENT      medtronic    [place] TOTAL KNEE ARTHROPLASTY Right 3/26/2018    TOTAL KNEE REPLACEMENT COMPLEX PRIMARY performed by Sho Rios MD at [address] [facility]      SPLENECTOMY      KRISTEL AND BSO      TONSILLECTOMY AND ADENOIDECTOMY      UPPER GASTROINTESTINAL ENDOSCOPY  12/2011    gerd s/p gastric bypass    UPPER GASTROINTESTINAL ENDOSCOPY  2/2014    normal s/p gastric bypass    UPPER GASTROINTESTINAL ENDOSCOPY  2/2010    biopsy neg Barretts, chronic reflux esophagitis s/p gastric bypass    UPPER GASTROINTESTINAL ENDOSCOPY  7/2006    unremarkable s/p gastric bypass    UPPER GASTROINTESTINAL ENDOSCOPY  8/10/15    Dr Yisroel Halsted UPPER GASTROINTESTINAL ENDOSCOPY  4/1/16    Dr Yariel Foreman    UPPER GASTROINTESTINAL ENDOSCOPY N/A 4/1/2016    EGD ESOPHAGOGASTRODUODENOSCOPY performed by Tabitha Brito MD at [facility]    [place] Right 2003      Social History     Socioeconomic History    Marital status:      Spouse name: Not on file    Number of children: Not on file    Years of education: Not on file    Highest education level: Not on file   Occupational History     Employer: [employer]    Social Needs    Financial resource strain: Not on file    Food insecurity:     Worry: Not on file     Inability: Not on file    Transportation needs:     Medical: Not on file     Non-medical: Not on file   Tobacco Use    Smoking status: Never Smoker    Smokeless tobacco: Never Used   Substance and Sexual Activity    Alcohol use: No    Drug use: No    Sexual activity: Not Currently   Lifestyle    Physical activity:     Days per week: Not on file     Minutes per session: Not on file    Stress: Not on file Relationships    Social connections:     Talks on phone: Not on file     Gets together: Not on file     Attends Church service: Not on file     Active member of club or organization: Not on file     Attends meetings of clubs or organizations: Not on file     Relationship status: Not on file    Intimate partner violence:     Fear of current or ex partner: Not on file     Emotionally abused: Not on file     Physically abused: Not on file     Forced sexual activity: Not on file   Other Topics Concern    Not on file   Social History Narrative    Not on file      Family History   Problem Relation Age of Onset    Uterine Cancer Mother     Cervical Cancer Mother     Coronary Art Dis Mother     Heart Disease Father     Lung Cancer Father     Other Father         renal failure    Colon Cancer Brother     Colon Polyps Brother     Uterine Cancer Maternal Grandmother     Cervical Cancer Maternal Grandmother     Diabetes Maternal Grandmother     Cervical Cancer Sister     Other Sister         fibromyalgia    Diabetes Paternal Aunt     Esophageal Cancer Neg Hx     Liver Cancer Neg Hx     Liver Disease Neg Hx     Stomach Cancer Neg Hx     Rectal Cancer Neg Hx         Current Outpatient Medications   Medication Sig Dispense Refill    methylPREDNISolone (MEDROL DOSEPACK) 4 MG tablet Take by mouth.  1 kit 0    baclofen (LIORESAL) 10 MG tablet Take 1 tablet by mouth 3 times daily As needed for hip pain 10 tablet 1    diclofenac sodium 1 % GEL Apply 4 g topically 4 times daily 4 Tube 1    diclofenac (PENNSAID) 2 % SOLN Place 1 Applicatorful onto the skin 2 times daily 1 Bottle 2    ondansetron (ZOFRAN) 4 MG tablet Take 1 tablet by mouth daily as needed for Nausea or Vomiting 30 tablet 0    warfarin (COUMADIN) 7.5 MG tablet 2 tablets daily, DO NOT RESUME UNTIL YOU RECHECK YOUR INR ON THURSDAY AND DISCUSS WITH  tablet 3    cloNIDine (CATAPRES) 0.1 MG tablet Take 1 tablet by mouth 2 times daily As needed for BP greater than 170/110 60 tablet 3    Multiple Vitamins-Minerals (CENTRUM ADULTS) TABS Take 1 tablet by mouth daily      escitalopram (LEXAPRO) 20 MG tablet Take 1 tablet by mouth daily 90 tablet 2    pregabalin (LYRICA) 75 MG capsule Take 1 capsule by mouth 3 times daily for 90 days. 90 capsule 2    lisinopril (PRINIVIL;ZESTRIL) 40 MG tablet Take 1 tablet by mouth daily 90 tablet 0    bumetanide (BUMEX) 2 MG tablet Take 1 tablet by mouth daily 30 tablet 5    levothyroxine (SYNTHROID) 100 MCG tablet Take 1 tablet by mouth Daily 90 tablet 3    pantoprazole sodium (PROTONIX) 40 MG PACK packet Take 1 packet by mouth every morning (before breakfast) 30 each 3    tiotropium (SPIRIVA HANDIHALER) 18 MCG inhalation capsule Inhale 1 capsule into the lungs daily 90 capsule 1    potassium chloride (KLOR-CON M) 10 MEQ extended release tablet Take 1 tablet by mouth daily 90 tablet 3    ferrous gluconate (FERGON) 240 (27 Fe) MG tablet Take 1 tablet by mouth 3 times daily (with meals) 270 tablet 2    vitamin D (ERGOCALCIFEROL) 17886 units CAPS capsule Take 1 capsule by mouth Twice a Week 12 capsule 1    aspirin 81 MG tablet Take 81 mg by mouth daily      cyanocobalamin 1000 MCG/ML injection Inject 1 mL into the muscle once for 1 dose 1 mL 0    diclofenac sodium (VOLTAREN) 1 % GEL Apply 2 g topically 2 times daily 3 Tube 3    calcipotriene (DOVONEX) 0.005 % cream Use topically as needed 3 Tube 3    clobetasol (TEMOVATE) 0.05 % cream Apply topically 2 times daily. 3 Tube 3     No current facility-administered medications for this visit.          Patient Active Problem List   Diagnosis    Vomiting    Burping    GERD (gastroesophageal reflux disease)    History of gastric bypass    Family history of colon cancer    Bloating    Enuresis    Nausea and vomiting    Stable angina ([address]    Encounter for current long-term use of anticoagulants    Primary osteoarthritis of right knee    Arthritis of knee Negative for gait problem, joint swelling, myalgias, neck pain and neck stiffness. She had bilateral knee pain. Left hip pain   Skin: Negative for color change, pallor, rash and wound. Allergic/Immunologic: Positive for environmental allergies. Negative for food allergies and immunocompromised state. Neurological: Negative for dizziness, tremors, seizures, syncope, facial asymmetry, speech difficulty, weakness, light-headedness, numbness and headaches. Hematological: Negative for adenopathy. Does not bruise/bleed easily. Psychiatric/Behavioral: Positive for dysphoric mood. Negative for agitation, confusion, decreased concentration and hallucinations. The patient is not nervous/anxious and is not hyperactive. BP (!) 156/90 (Site: Left Upper Arm, Position: Sitting, Cuff Size: Medium Adult)   Pulse 74   Ht 5' 7\" (1.702 m)   Wt 241 lb (109.3 kg)   SpO2 99%   BMI 37.75 kg/m²   Physical Exam  Vitals signs and nursing note reviewed. Constitutional:       General: She is not in acute distress. Appearance: Normal appearance. She is well-developed. She is obese. She is not ill-appearing, toxic-appearing or diaphoretic. HENT:      Head: Normocephalic and atraumatic. Right Ear: Tympanic membrane, ear canal and external ear normal. There is no impacted cerumen. Left Ear: Tympanic membrane, ear canal and external ear normal.      Nose: Nose normal. No congestion or rhinorrhea. Mouth/Throat:      Mouth: Mucous membranes are moist.      Pharynx: Oropharynx is clear. No oropharyngeal exudate or posterior oropharyngeal erythema. Eyes:      General: No scleral icterus. Right eye: No discharge. Left eye: No discharge. Extraocular Movements: Extraocular movements intact. Conjunctiva/sclera: Conjunctivae normal.      Pupils: Pupils are equal, round, and reactive to light. Neck:      Musculoskeletal: Normal range of motion.  No neck rigidity or muscular vomiting         Prior to Visit Medications    Medication Sig Taking? Authorizing Provider   methylPREDNISolone (MEDROL DOSEPACK) 4 MG tablet Take by mouth. Yes Nancy West MD   baclofen (LIORESAL) 10 MG tablet Take 1 tablet by mouth 3 times daily As needed for hip pain Yes Nancy West MD   diclofenac sodium 1 % GEL Apply 4 g topically 4 times daily Yes Nancy West MD   diclofenac (PENNSAID) 2 % SOLN Place 1 Applicatorful onto the skin 2 times daily Yes Nancy West MD   ondansetron (ZOFRAN) 4 MG tablet Take 1 tablet by mouth daily as needed for Nausea or Vomiting Yes Nancy West MD   warfarin (COUMADIN) 7.5 MG tablet 2 tablets daily, DO NOT RESUME UNTIL YOU RECHECK YOUR INR ON THURSDAY AND DISCUSS WITH PCP Yes Danilo Hamilton MD   cloNIDine (CATAPRES) 0.1 MG tablet Take 1 tablet by mouth 2 times daily As needed for BP greater than 170/110 Yes Nancy West MD   Multiple Vitamins-Minerals (CENTRUM ADULTS) TABS Take 1 tablet by mouth daily Yes Historical Provider, MD   escitalopram (LEXAPRO) 20 MG tablet Take 1 tablet by mouth daily Yes Nanyc West MD   pregabalin (LYRICA) 75 MG capsule Take 1 capsule by mouth 3 times daily for 90 days.  Yes Nancy West MD   lisinopril (PRINIVIL;ZESTRIL) 40 MG tablet Take 1 tablet by mouth daily Yes Nancy West MD   bumetanide (BUMEX) 2 MG tablet Take 1 tablet by mouth daily Yes Nancy West MD   levothyroxine (SYNTHROID) 100 MCG tablet Take 1 tablet by mouth Daily Yes Nancy West MD   pantoprazole sodium (PROTONIX) 40 MG PACK packet Take 1 packet by mouth every morning (before breakfast) Yes Nancy West MD   tiotropium (SPIRIVA HANDIHALER) 18 MCG inhalation capsule Inhale 1 capsule into the lungs daily Yes Nancy West MD   potassium chloride (KLOR-CON M) 10 MEQ extended release tablet Take 1 tablet by mouth daily Yes Nancy West MD   ferrous gluconate (FERGON) 240 (27 Fe) MG tablet Take 1 tablet by mouth 3 times daily (with meals) Yes Jacobo Jordan MD   vitamin D (ERGOCALCIFEROL) 88954 units CAPS capsule Take 1 capsule by mouth Twice a Week Yes Jacobo Jordan MD   aspirin 81 MG tablet Take 81 mg by mouth daily Yes Historical Provider, MD   cyanocobalamin 1000 MCG/ML injection Inject 1 mL into the muscle once for 1 dose Yes Jacobo Jordan MD   diclofenac sodium (VOLTAREN) 1 % GEL Apply 2 g topically 2 times daily Yes Jacobo Jrodan MD   calcipotriene (DOVONEX) 0.005 % cream Use topically as needed Yes CHANELLE Shearer   clobetasol (TEMOVATE) 0.05 % cream Apply topically 2 times daily.  Yes CHANELLE Shearer       Past Medical History:   Diagnosis Date    Abdominal pain     Abnormal EKG     Acute sinusitis     Allergic reaction to spider bite     Anemia     Anticoagulated     Anxiety     Arrhythmia     Asthmatic bronchitis without complication 5/38/6490    Ataxic gait     Lyons's esophagus     Bowel obstruction (HCC)     Callus     Cardiac pacemaker     CKD (chronic kidney disease) stage 2, GFR 60-89 ml/min     Coat's syndrome     Coat's syndrome     both eyes    Depression     Diabetes mellitus type 2 in nonobese (HCC)     Diabetic nephropathy (HCC)     Disequilibrium     Dizziness     DVT (deep venous thrombosis) (HCC)     Exudative retinopathy     Fibromyalgia     Fibromyositis     Gastric ulcer     GERD (gastroesophageal reflux disease)     History of gastric bypass     Hx of lupus anticoagulant disorder     Hypertension     Hypothyroidism     Intermittent claudication (HCC)     Intestinal obstruction (HCC)     Iron deficiency     Left-sided weakness     Low vitamin D level     Lupus (HCC)     Menopause     Obesity     Osteoarthritis     Osteoporosis     Other iron deficiency anemias     Pernicious anemia     PUPP (pruritic urticarial papules and plaques of pregnancy)     Right leg numbness     Right sided sciatica     Sarcoidosis     with liver Dis Mother     Heart Disease Father     Lung Cancer Father     Other Father         renal failure    Colon Cancer Brother     Colon Polyps Brother     Uterine Cancer Maternal Grandmother     Cervical Cancer Maternal Grandmother     Diabetes Maternal Grandmother     Cervical Cancer Sister     Other Sister         fibromyalgia    Diabetes Paternal Aunt     Esophageal Cancer Neg Hx     Liver Cancer Neg Hx     Liver Disease Neg Hx     Stomach Cancer Neg Hx     Rectal Cancer Neg Hx        CareTeam (Including outside providers/suppliers regularly involved in providing care):   Patient Care Team:  Bladimir Abarca MD as PCP - General (Family Medicine)  Bladimir Abarca MD as PCP - [facility] Empaneled Provider  Anali Jovel (General Surgery: [facility])  CHANELLE Alonso as Nurse Practitioner (Family Nurse Practitioner)  CHANELLE Magana as Nurse Practitioner (Clinical Nurse Specialist Adult Health)  Karen Briseno RN as Ambulatory Care Manager    Wt Readings from Last 3 Encounters:   03/06/20 241 lb (109.3 kg)   02/03/20 233 lb (105.7 kg)   01/31/20 233 lb (105.7 kg)     Vitals:    03/06/20 1108   BP: (!) 156/90   Site: Left Upper Arm   Position: Sitting   Cuff Size: Medium Adult   Pulse: 74   SpO2: 99%   Weight: 241 lb (109.3 kg)   Height: 5' 7\" (1.702 m)           The following problems were reviewed today and where indicated follow up appointments were made and/or referrals ordered.     Risk Factor Screenings with Interventions     Fall Risk:  2 or more falls in past year?: (!) yes  Fall with injury in past year?: no  Fall Risk Interventions:    · Home safety tips provided    Depression:  PHQ-2 Score: 0  Depression Interventions:  · Relaxation techniques discussed    Anxiety:     Anxiety Interventions:  · Relaxation techniques discussed    Cognitive:  Clock Drawing Test (CDT) Score: Normal  Cognitive Impairment Interventions:  · Patient declines any further evaluation/treatment for No  General Health Risk Interventions:  · none needed    Health Habits/Nutrition  Do you exercise for at least 20 minutes 2-3 times per week?: (!) No  Have you lost any weight without trying in the past 3 months?: (!) Yes  Do you eat fewer than 2 meals per day?: (!) Yes  Have you seen a dentist within the past year?: (!) No  Body mass index is 37.75 kg/m². Health Habits/Nutrition Interventions:  · none needed    Hearing/Vision  Do you or your family notice any trouble with your hearing?: No  Do you have difficulty driving, watching TV, or doing any of your daily activities because of your eyesight?: No  Have you had an eye exam within the past year?: Yes  Hearing/Vision Interventions:  · none needed    Safety  Do you have working smoke detectors?: Yes  Have all throw rugs been removed or fastened?: Yes  Do you have non-slip mats or surfaces in all bathtubs/showers?: Yes  Do all of your stairways have a railing or banister?: Yes  Are your doorways, halls and stairs free of clutter?: (!) No  Do you always fasten your seatbelt when you are in a car?: Yes  Safety Interventions:  · Patient declines any further evaluation/treatment for this issue    ADLs  In the past 7 days, did you need help from others to perform any of the following everyday activities? Eating, dressing, grooming, bathing, toileting, or walking/balance?: (!) Walking/Balance  In the past 7 days, did you need help from others to take care of any of the following?  Laundry, housekeeping, banking/finances, shopping, telephone use, food preparation, transportation, or taking medications?: (!) Transportation, Taking Medications  ADL Interventions:  · Patient declines any further evaluation/treatment for this issue    Personalized Preventive Plan   Current Health Maintenance Status  Immunization History   Administered Date(s) Administered    Influenza Whole 10/12/2015    Influenza, High Dose (Fluzone 65 yrs and older) 09/15/2017, 10/12/2018    Influenza,

## 2020-03-09 ENCOUNTER — TELEPHONE (OUTPATIENT)
Dept: INTERNAL MEDICINE | Age: 70
End: 2020-03-09

## 2020-03-09 NOTE — TELEPHONE ENCOUNTER
Patient states that the MRI is suppose to be of her right hip. NOT the left. I will call and change this order at [facility].

## 2020-03-12 ENCOUNTER — TELEPHONE (OUTPATIENT)
Dept: CARDIOLOGY | Facility: CLINIC | Age: 70
End: 2020-03-12

## 2020-03-12 ENCOUNTER — HOSPITAL ENCOUNTER (OUTPATIENT)
Dept: MRI IMAGING | Age: 70
Discharge: HOME | End: 2020-03-12
Payer: MEDICARE

## 2020-03-12 NOTE — TELEPHONE ENCOUNTER
Patient is scheduled to have MRI performed at [facility] on 3/27/2020.  A cardiology order form is needed for her Medtronic pacemaker.  RN will obtain form and will fax to [facility] when completed.

## 2020-03-13 ENCOUNTER — PATIENT MESSAGE (OUTPATIENT)
Dept: INTERNAL MEDICINE | Age: 70
End: 2020-03-13

## 2020-03-13 ENCOUNTER — OFFICE VISIT (OUTPATIENT)
Dept: NEUROLOGY | Facility: CLINIC | Age: 70
End: 2020-03-13

## 2020-03-13 VITALS
OXYGEN SATURATION: 98 % | HEART RATE: 65 BPM | DIASTOLIC BLOOD PRESSURE: 130 MMHG | SYSTOLIC BLOOD PRESSURE: 210 MMHG | HEIGHT: 67 IN | WEIGHT: 235 LBS | BODY MASS INDEX: 36.88 KG/M2

## 2020-03-13 DIAGNOSIS — G47.33 OBSTRUCTIVE SLEEP APNEA SYNDROME: Primary | ICD-10-CM

## 2020-03-13 DIAGNOSIS — F33.2 SEVERE EPISODE OF RECURRENT MAJOR DEPRESSIVE DISORDER, WITHOUT PSYCHOTIC FEATURES (HCC): ICD-10-CM

## 2020-03-13 PROCEDURE — 99213 OFFICE O/P EST LOW 20 MIN: CPT | Performed by: PHYSICIAN ASSISTANT

## 2020-03-13 RX ORDER — BACLOFEN 10 MG/1
10 TABLET ORAL AS NEEDED
COMMUNITY
Start: 2020-03-06

## 2020-03-13 NOTE — PROGRESS NOTES
Neurology Progress Note      Chief Complaint:    Obstructive sleep apnea  Daytime hypersomnolence    Subjective     Subjective:  Patient presents to clinic today for follow-up evaluation of obstructive sleep apnea.  Since last being seen, the patient has been hospitalized several times for severe gastroenteritis and pacemaker generator replacement.  For this reason, she has not been on her CPAP recently as it was not provided while she was in the hospital.  She is only recently resumed this.  Therefore, compliance download from 2/11/2020-3/11/2020 demonstrates only 3 days of usage more than 4 hours with an average usage time of 5 hours and 30 minutes.  AHI appears elevated at 12.0, however, this machine does not record central events.  Again, patient was hospitalized for a good portion of the time during this timeframe.  PHQ-9 administered in the office today demonstrates a composite score 16.  Patient states that she is having some depressive symptoms however, feels they are due to her recent change in health status.  She also is anticipating an updated MRI of the lumbar spine as she is having some radicular features into the right lower extremity.  All-in-all, however, the patient feels as though her depressive symptoms are actually quite stable at this time.  She continues to follow with Dr. Jackson, her family physician regarding these complaints.      Past Medical History:   Diagnosis Date   • Anxiety    • Arrhythmia    • Blood clot in vein 05/2018    Hospitalized    • Arben-tachy syndrome (CMS/HCC)    • Bradycardia    • Breath shortness    • Burn     left leg   • Cardiac pacemaker     11/09 [facility]   • Chest pain    • Chronic fatigue    • Depression    • Diabetes mellitus (CMS/HCC)    • Dizziness    • Fatigue    • Follow-up exam    • Heart burn    • Hypercoagulable state, primary (CMS/HCC)     LUPUS ANTI-COAG   • Hyperlipidemia    • Hypertension    • Liver disease    • Shortness of breath    • Sleep apnea      complex.  using a c-pap machine   • Stroke (CMS/HCC)    • Syncope    • Tachy-redd syndrome (CMS/HCC)      Past Surgical History:   Procedure Laterality Date   • APPENDECTOMY     • CARDIAC ELECTROPHYSIOLOGY PROCEDURE N/A 2/26/2020    Procedure: PPM generator change - dual;  Surgeon: Ronny Lowe MD;  Location:  [facility];  Service: Cardiology;  Laterality: N/A;   • CATARACT EXTRACTION     • CHOLECYSTECTOMY     • HERNIA REPAIR     • HYSTERECTOMY     • INSERT / REPLACE / REMOVE PACEMAKER     • OOPHORECTOMY     • PACEMAKER IMPLANTATION     • REPLACEMENT TOTAL KNEE Right 03/26/2018    Dr doherty    • TRAM-EN-Y PROCEDURE  2002    Dr Dahiana [facility]   • SPLENECTOMY       Family History   Problem Relation Age of Onset   • Coronary artery disease Mother    • Hyperlipidemia Mother    • Anemia Mother    • Cervical cancer Mother    • Kidney failure Father    • Heart failure Father    • Fibromyalgia Sister    • Anemia Sister    • Clotting disorder Sister    • Alcohol abuse Brother    • Cancer Maternal Grandmother    • Diabetes Maternal Grandmother    • Heart failure Maternal Grandfather    • No Known Problems Paternal Grandmother    • No Known Problems Paternal Grandfather    • Colon cancer Brother      Social History     Tobacco Use   • Smoking status: Never Smoker   • Smokeless tobacco: Never Used   Substance Use Topics   • Alcohol use: No   • Drug use: No       Medications:  Current Outpatient Medications   Medication Sig Dispense Refill   • aspirin 81 MG EC tablet daily.     • baclofen (LIORESAL) 10 MG tablet TAKE 1 TABLET BY MOUTH THREE TIMES DAILY AS NEEDED FOR HIP PAIN     • bumetanide (BUMEX) 1 MG tablet Take 2 mg by mouth Daily.     • calcipotriene (DOVONEX) 0.005 % cream Apply  topically to the appropriate area as directed As Needed.     • clobetasol (TEMOVATE) 0.05 % cream Apply  topically 2 (Two) Times a Day.     • CloNIDine (CATAPRES) 0.1 MG tablet Take 0.1 mg by mouth 2 (Two) Times  a Day As Needed.     • cyanocobalamin 1000 MCG/ML injection Inject 1,000 mcg into the shoulder, thigh, or buttocks Every 28 (Twenty-Eight) Days.     • Diclofenac Sodium 2 % solution Place 1 applicator on the skin as directed by provider 2 (Two) Times a Day.     • escitalopram (LEXAPRO) 20 MG tablet Take 1 tablet by mouth Daily. 30 tablet 2   • Ferrous Gluconate 239 (27 Fe) MG tablet Take 1 tablet by mouth 3 (Three) Times a Day.     • levothyroxine (SYNTHROID, LEVOTHROID) 100 MCG tablet Take 100 mcg by mouth Daily.     • lisinopril (PRINIVIL,ZESTRIL) 40 MG tablet Take 40 mg by mouth Daily.     • Multiple Vitamins-Minerals (MULTIVITAMIN WITH MINERALS) tablet tablet Take 1 tablet by mouth Daily.     • ondansetron (ZOFRAN) 4 MG tablet Take 4 mg by mouth Daily As Needed for Nausea or Vomiting.     • pantoprazole (PROTONIX) 40 MG EC tablet Take 40 mg by mouth Daily.     • polyethylene glycol (MIRALAX) packet Take 17 g by mouth As Needed.     • potassium chloride (K-DUR,KLOR-CON) 10 MEQ CR tablet Take 10 mEq by mouth Daily.     • pregabalin (LYRICA) 75 MG capsule Take 75 mg by mouth 3 (Three) Times a Day.     • tiotropium (SPIRIVA HANDIHALER) 18 MCG per inhalation capsule Place 18 mcg into inhaler and inhale Daily.     • vitamin D (ERGOCALCIFEROL) 1.25 MG (35048 UT) capsule capsule Take 50,000 Units by mouth 2 (Two) Times a Week.     • warfarin (COUMADIN) 7.5 MG tablet Take 7.5 mg by mouth. Take 7.5mg  1 po daily managed by pcp Dr. Jackson       No current facility-administered medications for this visit.        Allergies:    Bee venom; Insect extract allergy skin test; Percocet [oxycodone-acetaminophen]; Prednisone; Ultram [tramadol hcl]; Tizanidine hcl; Hydrocodone-acetaminophen; and Other    Review of Systems:   -A 14 point review of systems is completed and is negative.      Objective      Vital Signs  Heart Rate:  [65] 65  BP: (210)/(130) 210/130    Physical Exam:    General Exam:  Head:  Normocephalic,  atraumatic.  HEENT: PERRLA.  Full EOM.  Mallampati Class 3 anatomy.  Neck:  No lymphadenopathy, thyromegaly or bruit.  Neck circumference is 16.5 inches.  Cardiac:  Regular rate and rhythm.  Normal S1, S2.  No murmur, rub or gallop.  Lungs:  Clear to auscultation bilaterally.  No wheeze, rales or rhonchi.  Abdomen:  Non-tender, Non-distended.  Bowel sounds normoactive.  Extremities: Full peripheral pulses.  No clubbing, cyanosis or edema.  Skin: No ulceration, breakdown or rash.       Results Review:        Assessment/Plan     Impression:  1. Obstructive sleep apnea  2. Major depressive disorder        Plan:  1. I have reviewed the current compliance download and findings of the previous polysomnography with the patient in detail.  The patient voices understanding and recognizes the need for adherence to the prescribed therapy.  They understand that a certain level of compliance allows for continued insurance coverage as well as adequate treatment of underlying apnea.  They understand the impact this has upon their overall health status and other medical comorbidities.  2. I have recommended instituting regular cardiovascular exercise in the form of walking, biking or swimming 30-40 minutes at a time at least 3-4 times per week.  3. Counseled on multimodal approach to treatment of sleep apnea to include but not limited to diet, exercise, sleep hygiene, compliance with pap therapy. Encouraged lateral sleeping position and to avoid sedatives or alcohol close to bedtime. Risks of untreated sleep apnea were discussed to include but not limited to HTN, heart disease, stroke, cardiac arrhythmia such as AFIB, and dementia.  4. The patient will follow-up with me on an annual basis.  I am going to repeat a compliance download in about 2 months to see if her overall compliance improves as her other health issues are improved.  She will call for any concerns or questions in the interim.  If her AHI remains markedly elevated  with good compliance, an updated titration study may be appropriate.  The patient will call for any concerns or questions in the interim.      Antoine Acuna PA-C  03/13/20  09:09

## 2020-03-13 NOTE — TELEPHONE ENCOUNTER
From: Sarbjit Franco  To: Eduardo Rodriguez MD  Sent: 3/13/2020 3:11 PM CDT  Subject: Visit Follow-Up Question    I finished the dose pack, have taken the baclofen and no sauces, pain in my right side is still severe. Couldn't have the MRI because whomever scheduled the procedure said that I didn't have a pacemaker, so now I have to wait until the 27th.

## 2020-03-16 RX ORDER — BACLOFEN 10 MG/1
10 TABLET ORAL 3 TIMES DAILY
Qty: 10 TABLET | Refills: 1 | Status: SHIPPED | OUTPATIENT
Start: 2020-03-16 | End: 2020-07-17 | Stop reason: SDUPTHER

## 2020-03-16 NOTE — TELEPHONE ENCOUNTER
Orders completed and signed by Dr. Clifton Santos, as Dr. Lowe is out of the office.  Orders faxed to [facility].

## 2020-03-23 ENCOUNTER — PATIENT MESSAGE (OUTPATIENT)
Dept: INTERNAL MEDICINE | Age: 70
End: 2020-03-23

## 2020-03-23 LAB — INR BLD: 1.6

## 2020-03-24 ENCOUNTER — ANTI-COAG VISIT (OUTPATIENT)
Dept: INTERNAL MEDICINE | Age: 70
End: 2020-03-24
Payer: MEDICARE

## 2020-03-24 PROCEDURE — 93793 ANTICOAG MGMT PT WARFARIN: CPT | Performed by: INTERNAL MEDICINE

## 2020-03-26 ENCOUNTER — DOCUMENTATION (OUTPATIENT)
Dept: CARDIOLOGY | Facility: CLINIC | Age: 70
End: 2020-03-26

## 2020-03-26 DIAGNOSIS — Z95.0 PACEMAKER: Primary | ICD-10-CM

## 2020-03-26 NOTE — PROGRESS NOTES
Dual Chamber Pacemaker Evaluation Report  REMOTE/CARELINK    March 26, 2020    Primary Cardiologist: Mynor  : Medtronic Model: Mesha MARTINEZ [id number]  Implant date: 1950    Reason for evaluation: generator change follow up  Indication for pacemaker: sick sinus syndrome and atrial arrhythmias    Measurements  Atrial sensing - P wave: 2.4 mV  Atrial threshold: 0.5V@ 0.4ms  Atrial lead impedance: 437 ohms  Ventricular sensing - R wave: 3.9 mV  Ventricular threshold: 1.625 V @ 0.4 ms  Ventricular lead impedance:   418 ohms     Diagnostic Data  Atrial paced: 81.3 %  Ventricular paced: <0.1 %    Episodes recorded since 2/26/2020:  SVT x 4, longest duration 6 seconds, rates 168-214 bpm.    Incision:  Per patient over the phone, incision has healed well and is without redness, swelling, open areas, warmth, or drainage of any kind.  Patient instructed to contact our office immediately if s/s of infection develop.  Understanding verbalized.    Home Monitor:  Functioning    Battery status: satisfactory   3.03V, estimated 11 years remaining      Final Parameters  Mode:  AAIR+  Lower rate: 60 bpm   Upper rate: 130 bpm  AV Delay: paced- 180 ms  Sensed-150 ms  Atrial - Amplitude: 1.5 V   Pulse width: 0.4 ms   Sensitivity: 0.3 mV     Ventricular - Amplitude: 3.25 V  Pulse width: 0.4 ms  Sensitivity: 0.9 mV    Changes made: No changes made.  Conclusions: normal pacemaker function, stable pacing and sensing thresholds and adequate battery reserve    Follow up: In office check on 5/29/2020

## 2020-03-27 RX ORDER — CEFDINIR 300 MG/1
300 CAPSULE ORAL 2 TIMES DAILY
Qty: 14 CAPSULE | Refills: 0 | Status: SHIPPED | OUTPATIENT
Start: 2020-03-27 | End: 2020-04-03

## 2020-03-27 NOTE — TELEPHONE ENCOUNTER
Does she want to make a phone call or virtual visit? This sounds kind of like allergies. Try Zyrtec OTC.  Omnicef 300 mg BID for chest congestion for 7 days

## 2020-03-27 NOTE — TELEPHONE ENCOUNTER
Pt c/o of dry cough. She says she is beginning to develop some chest congestion. Symptoms started about a week ago. WM/KO Please advise.

## 2020-03-27 NOTE — TELEPHONE ENCOUNTER
From: Jamee Bailey  Sent: 3/27/2020 11:48 AM CDT  To: [facility] [id number] [facility] Internal Medicine Clinical Staff  Subject: RE: Test Results Question    I have a dry cough. It's in my throat and going down my chest. My eyes are itchy and running.     ----- Message -----  From: Elia Dakins  Sent: 3/24/20 2:52 PM  To: Jamee Bailey  Subject: RE: Test Results Question    Take 15mg tonight and then resume your current dose tomorrow. Recheck in 1 week. Katerina/GWYN for Dr. Galina Cardenas      ----- Message -----   From: Jamee Bailey   Sent:3/24/2020 1:56 PM EDT   To:Regina Nesbitt MD   Subject:RE: Test Results Question    7.5 mg daily. Missed 2 days but are aspinach salad and 2 days of cabbage.       ----- Message -----   From:ANUPAMA Middleton ALEXANDRO   Sent:3/24/2020 10:08 AM EDT   To:Leny Stone   Subject:RE: Test Results Question    What is your current dose of Coumadin and instructions for how you have been taking it? Have you missed any doses? Katerina/GWYN for Dr. Galina Cardenas      ----- Message -----   From: Jamee Bailey   Sent:3/23/2020 6:23 PM EDT   Adam Mccrary MD   Subject:Test Results Question    INR-1.6   PT-18.8       - - - DISCLAIMER - - -  [email or web address][This message may contain formatting that cannot be shown on this site.]

## 2020-03-30 RX ORDER — LEVOTHYROXINE SODIUM 0.1 MG/1
100 TABLET ORAL DAILY
Qty: 90 TABLET | Refills: 3 | Status: SHIPPED | OUTPATIENT
Start: 2020-03-30 | End: 2021-03-18 | Stop reason: SDUPTHER

## 2020-03-30 RX ORDER — PANTOPRAZOLE SODIUM 40 MG/1
40 GRANULE, DELAYED RELEASE ORAL
Qty: 30 EACH | Refills: 3 | Status: SHIPPED | OUTPATIENT
Start: 2020-03-30 | End: 2020-07-17

## 2020-03-30 NOTE — PROGRESS NOTES
I have reviewed the notes, assessments, and/or procedures performed by  Jaymie Graff RN, I concur with her  documentation of Veronica Stewart.

## 2020-03-31 ENCOUNTER — PATIENT MESSAGE (OUTPATIENT)
Dept: INTERNAL MEDICINE | Age: 70
End: 2020-03-31

## 2020-03-31 RX ORDER — PANTOPRAZOLE SODIUM 40 MG/1
40 TABLET, DELAYED RELEASE ORAL
Qty: 60 TABLET | Refills: 3 | Status: SHIPPED | OUTPATIENT
Start: 2020-03-31 | End: 2021-01-27

## 2020-03-31 NOTE — TELEPHONE ENCOUNTER
From: Marck Gutierrez  To: Gary Self MD  Sent: 3/31/2020 4:47 PM CDT  Subject: Prescription Question    My protonix doesn't come in packets, it comes 30 pills in a bottle. Please take that packet mess off of my prescription. I need my pills.

## 2020-04-01 RX ORDER — FEXOFENADINE HCL 180 MG/1
180 TABLET ORAL DAILY
Qty: 90 TABLET | Refills: 3 | Status: SHIPPED | OUTPATIENT
Start: 2020-04-01 | End: 2020-12-14 | Stop reason: SDUPTHER

## 2020-04-01 NOTE — TELEPHONE ENCOUNTER
Requested Prescriptions     Pending Prescriptions Disp Refills    fexofenadine (ALLEGRA) 180 MG tablet 90 tablet 3     Sig: Take 1 tablet by mouth daily Indications:  Allergic Rhinitis

## 2020-04-01 NOTE — TELEPHONE ENCOUNTER
From: Fletcher Valdez  To:  Romi Solorzano MD  Sent: 3/31/2020 5:03 PM CDT  Subject: Prescription Question    I also need a prescription for generic allegra

## 2020-04-07 ENCOUNTER — ANTI-COAG VISIT (OUTPATIENT)
Dept: INTERNAL MEDICINE | Age: 70
End: 2020-04-07
Payer: MEDICARE

## 2020-04-07 LAB — INR BLD: 1.6

## 2020-04-07 PROCEDURE — 93793 ANTICOAG MGMT PT WARFARIN: CPT | Performed by: INTERNAL MEDICINE

## 2020-04-17 ENCOUNTER — HOSPITAL ENCOUNTER (OUTPATIENT)
Dept: INFUSION THERAPY | Age: 70
End: 2020-04-17
Payer: MEDICARE

## 2020-04-28 ENCOUNTER — ANTI-COAG VISIT (OUTPATIENT)
Dept: INTERNAL MEDICINE | Age: 70
End: 2020-04-28
Payer: MEDICARE

## 2020-04-28 ENCOUNTER — ANTI-COAG VISIT (OUTPATIENT)
Dept: INTERNAL MEDICINE | Age: 70
End: 2020-04-28

## 2020-04-28 LAB
INR BLD: 2.4
INTERNATIONAL NORMALIZATION RATIO, POC: 2.4

## 2020-04-28 PROCEDURE — 93793 ANTICOAG MGMT PT WARFARIN: CPT | Performed by: INTERNAL MEDICINE

## 2020-04-28 PROCEDURE — 85610 PROTHROMBIN TIME: CPT | Performed by: INTERNAL MEDICINE

## 2020-04-28 NOTE — PROGRESS NOTES
HOME MONITORING REPORT    INR today: 2.4    Results for orders placed or performed in visit on 04/28/20   POCT INR   Result Value Ref Range    INR 2.4        INR Goal: 2.0-3.0    Dosing Plan  As of 4/28/2020    TTR:   16.0 % (2 y)   Full warfarin instructions:   7.5 mg every day              PLAN:PATIENT NOTIFIED TO  CONTINUE CURRENT DOSE AND RECHECK IN ONE WEEK.

## 2020-04-28 NOTE — PROGRESS NOTES
HOME MONITORING REPORT    INR today:   Results for orders placed or performed in visit on 04/28/20   Protime-INR   Result Value Ref Range    INR 2.40        INR Goal: 2.0-3.0    Dosing Plan  As of 4/28/2020    TTR:   16.0 % (2 y)   Full warfarin instructions:   7.5 mg every day              PLAN:PATIENT NOTIFIED TO  CONTINUE CURRENT DOSE AND RECHECK IN ONE WEEK.     Electronically signed by Meenakshi Rodgers MD on 4/28/2020 at 4:21 PM

## 2020-05-15 ENCOUNTER — ANTI-COAG VISIT (OUTPATIENT)
Dept: INTERNAL MEDICINE | Age: 70
End: 2020-05-15
Payer: MEDICARE

## 2020-05-15 LAB — INR BLD: 2.5

## 2020-05-15 PROCEDURE — 93793 ANTICOAG MGMT PT WARFARIN: CPT | Performed by: INTERNAL MEDICINE

## 2020-05-15 NOTE — PROGRESS NOTES
HOME MONITORING REPORT    INR today:   Results for orders placed or performed in visit on 05/15/20   Protime-INR   Result Value Ref Range    INR 2.50        INR Goal: 2.0-3.0    Dosing Plan  As of 5/15/2020    TTR:   17.9 % (2.1 y)   Full warfarin instructions:   7.5 mg every day              PLAN:  CONTINUE CURRENT DOSE AND RECHECK IN ONE WEEK. Electronically signed by Tha Lovell MD on 5/15/2020 at 3:51 PM   I have reviewed nursing plan for Coumadin management and agree with plan.

## 2020-05-20 ENCOUNTER — HOSPITAL ENCOUNTER (OUTPATIENT)
Dept: WOMENS IMAGING | Age: 70
Discharge: HOME | End: 2020-05-20
Payer: MEDICARE

## 2020-05-20 PROCEDURE — 77063 BREAST TOMOSYNTHESIS BI: CPT

## 2020-05-20 PROCEDURE — 77080 DXA BONE DENSITY AXIAL: CPT

## 2020-05-29 ENCOUNTER — HOSPITAL ENCOUNTER (OUTPATIENT)
Dept: MRI IMAGING | Age: 70
Discharge: HOME | End: 2020-05-29
Payer: MEDICARE

## 2020-05-29 PROCEDURE — 73721 MRI JNT OF LWR EXTRE W/O DYE: CPT

## 2020-06-03 ENCOUNTER — ANTI-COAG VISIT (OUTPATIENT)
Dept: INTERNAL MEDICINE | Age: 70
End: 2020-06-03
Payer: MEDICARE

## 2020-06-03 LAB — INR BLD: 1.9

## 2020-06-03 PROCEDURE — 93793 ANTICOAG MGMT PT WARFARIN: CPT | Performed by: INTERNAL MEDICINE

## 2020-06-11 RX ORDER — BUMETANIDE 1 MG/1
TABLET ORAL
Qty: 90 TABLET | Refills: 0 | Status: SHIPPED | OUTPATIENT
Start: 2020-06-11 | End: 2020-06-18

## 2020-06-18 ENCOUNTER — OFFICE VISIT (OUTPATIENT)
Dept: HEMATOLOGY | Age: 70
End: 2020-06-18
Payer: MEDICARE

## 2020-06-18 ENCOUNTER — HOSPITAL ENCOUNTER (OUTPATIENT)
Dept: INFUSION THERAPY | Age: 70
Discharge: HOME | End: 2020-06-18
Payer: MEDICARE

## 2020-06-18 VITALS
OXYGEN SATURATION: 98 % | DIASTOLIC BLOOD PRESSURE: 76 MMHG | BODY MASS INDEX: 37.02 KG/M2 | HEART RATE: 85 BPM | HEIGHT: 67 IN | TEMPERATURE: 98.1 F | WEIGHT: 235.9 LBS | SYSTOLIC BLOOD PRESSURE: 126 MMHG

## 2020-06-18 DIAGNOSIS — D50.9 IRON DEFICIENCY ANEMIA, UNSPECIFIED IRON DEFICIENCY ANEMIA TYPE: ICD-10-CM

## 2020-06-18 LAB
ALBUMIN SERPL-MCNC: 4.4 G/DL (ref 3.5–5.2)
ALP BLD-CCNC: 92 U/L (ref 35–104)
ALT SERPL-CCNC: 12 U/L (ref 9–52)
ANION GAP SERPL CALCULATED.3IONS-SCNC: 8 MMOL/L (ref 7–19)
AST SERPL-CCNC: 27 U/L (ref 14–36)
BASOPHILS ABSOLUTE: 0.02 K/UL (ref 0.01–0.08)
BASOPHILS RELATIVE PERCENT: 0.4 % (ref 0.1–1.2)
BILIRUB SERPL-MCNC: 0.9 MG/DL (ref 0.2–1.3)
BUN BLDV-MCNC: 19 MG/DL (ref 7–17)
CALCIUM SERPL-MCNC: 9 MG/DL (ref 8.4–10.2)
CHLORIDE BLD-SCNC: 107 MMOL/L (ref 98–111)
CO2: 26 MMOL/L (ref 22–29)
CREAT SERPL-MCNC: 1.1 MG/DL (ref 0.5–1)
EOSINOPHILS ABSOLUTE: 0.2 K/UL (ref 0.04–0.54)
EOSINOPHILS RELATIVE PERCENT: 4 % (ref 0.7–7)
FERRITIN: 65 NG/ML (ref 11.1–264)
GFR NON-AFRICAN AMERICAN: 49
GLOBULIN: 3.9 G/DL
GLUCOSE BLD-MCNC: 78 MG/DL (ref 74–106)
HCT VFR BLD CALC: 38.1 % (ref 34.1–44.9)
HEMOGLOBIN: 12 G/DL (ref 11.2–15.7)
IRON SATURATION: 25 % (ref 14–50)
IRON: 103 UG/DL (ref 37–170)
LYMPHOCYTES ABSOLUTE: 1.79 K/UL (ref 1.18–3.74)
LYMPHOCYTES RELATIVE PERCENT: 35.5 % (ref 19.3–53.1)
MCH RBC QN AUTO: 30.1 PG (ref 25.6–32.2)
MCHC RBC AUTO-ENTMCNC: 31.5 G/DL (ref 32.3–35.5)
MCV RBC AUTO: 95.5 FL (ref 79.4–94.8)
MONOCYTES ABSOLUTE: 0.69 K/UL (ref 0.24–0.82)
MONOCYTES RELATIVE PERCENT: 13.7 % (ref 4.7–12.5)
NEUTROPHILS ABSOLUTE: 2.34 K/UL (ref 1.56–6.13)
NEUTROPHILS RELATIVE PERCENT: 46.4 % (ref 34–71.1)
PDW BLD-RTO: 16.1 % (ref 11.7–14.4)
PLATELET # BLD: 170 K/UL (ref 182–369)
PMV BLD AUTO: 11.6 FL (ref 7.4–10.4)
POTASSIUM SERPL-SCNC: 4.4 MMOL/L (ref 3.5–5.1)
RBC # BLD: 3.99 M/UL (ref 3.93–5.22)
SODIUM BLD-SCNC: 141 MMOL/L (ref 137–145)
TOTAL IRON BINDING CAPACITY: 418 UG/DL (ref 265–497)
TOTAL PROTEIN: 8.3 G/DL (ref 6.3–8.2)
VITAMIN B-12: 245 PG/ML (ref 239–931)
WBC # BLD: 5.04 K/UL (ref 3.98–10.04)

## 2020-06-18 PROCEDURE — 4040F PNEUMOC VAC/ADMIN/RCVD: CPT | Performed by: NURSE PRACTITIONER

## 2020-06-18 PROCEDURE — 99211 OFF/OP EST MAY X REQ PHY/QHP: CPT

## 2020-06-18 PROCEDURE — G8427 DOCREV CUR MEDS BY ELIG CLIN: HCPCS | Performed by: NURSE PRACTITIONER

## 2020-06-18 PROCEDURE — 99213 OFFICE O/P EST LOW 20 MIN: CPT | Performed by: NURSE PRACTITIONER

## 2020-06-18 PROCEDURE — 1090F PRES/ABSN URINE INCON ASSESS: CPT | Performed by: NURSE PRACTITIONER

## 2020-06-18 PROCEDURE — 80053 COMPREHEN METABOLIC PANEL: CPT

## 2020-06-18 PROCEDURE — 82728 ASSAY OF FERRITIN: CPT

## 2020-06-18 PROCEDURE — 83540 ASSAY OF IRON: CPT

## 2020-06-18 PROCEDURE — 3017F COLORECTAL CA SCREEN DOC REV: CPT | Performed by: NURSE PRACTITIONER

## 2020-06-18 PROCEDURE — G8417 CALC BMI ABV UP PARAM F/U: HCPCS | Performed by: NURSE PRACTITIONER

## 2020-06-18 PROCEDURE — G8399 PT W/DXA RESULTS DOCUMENT: HCPCS | Performed by: NURSE PRACTITIONER

## 2020-06-18 PROCEDURE — 85025 COMPLETE CBC W/AUTO DIFF WBC: CPT

## 2020-06-18 PROCEDURE — 83550 IRON BINDING TEST: CPT

## 2020-06-18 PROCEDURE — 1123F ACP DISCUSS/DSCN MKR DOCD: CPT | Performed by: NURSE PRACTITIONER

## 2020-06-18 PROCEDURE — 82607 VITAMIN B-12: CPT

## 2020-06-18 PROCEDURE — 1036F TOBACCO NON-USER: CPT | Performed by: NURSE PRACTITIONER

## 2020-06-18 ASSESSMENT — ENCOUNTER SYMPTOMS
DIARRHEA: 0
BLOOD IN STOOL: 0
SORE THROAT: 0
NAUSEA: 0
RESPIRATORY NEGATIVE: 1
ABDOMINAL PAIN: 0
GASTROINTESTINAL NEGATIVE: 1
EYE DISCHARGE: 0
EYE PAIN: 0
COUGH: 0
VOMITING: 0
WHEEZING: 0
SHORTNESS OF BREATH: 0
BACK PAIN: 0
EYE REDNESS: 0
CONSTIPATION: 0
EYES NEGATIVE: 1

## 2020-06-18 NOTE — PROGRESS NOTES
Progress Note      Pt Name: Bharath Sofia: 1950  MRN: [id number]    Date of evaluation: 6/18/2020  History Obtained From:  patient, electronic medical record    CHIEF COMPLAINT:    Chief Complaint   Patient presents with    Anemia     Iron deficiency anemia, unspecified iron deficiency anemia type     HISTORY OF PRESENT ILLNESS:    Nitish Mancera is a 78 y.o.  female with significant PMH recurrent DVT, thrombocytopenia and iron deficiency anemia. Marlene Pedroza continues on long-term anticoagulation with warfarin that is managed by Dr. Karoline Rivas. She is currently taking ferrous gluconate 240 mg PO 3 times daily, Dr. Karoline Rivas recommended the ferrous gluconate versus the ferrous sulfate and Leny switched since her last follow-up appointment. Marlene Pedroza presents today with no new complaints, continues to have chronic fatigue and bilateral lower extremity edema. She reports tolerating the ferrous gluconate significantly better than the ferrous sulfate, indicates constipation has improved and her dark stools resolved. Serology studies on 11/22/2019 documented a ferritin of 88, folate 16, iron 74, TIBC 368 and iron saturation of 20.1. CBC today    Is within normal limits with a hemoglobin of 12.0 and MCV of 95.5. She has a normal platelet count of 717,045, she has a history of mild thrombocytopenia with her platelet count waxing and waning. Iron substrates will be reevaluated today. 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 Karoline Rivas?    Venofer for a total of 1000 mg completed on 7/29/2018   Ferrous sulfate 325 mg PO twice a day , 8/15/2018-May 2020  Ferrous gluconate 240 mg p.o. 3 times daily initiated in May 2020    Hematology history #1- Recurrent DVT and history of pulmonary emboli  Marlene Pedroza was seen in initial consultation on 7/26/2018 during her hospitalization at [facility] for recurrent DVT and anemia. She has a history of pulmonary emboli and DVTand has been followed in the past by Dr. Екатерина Coats. Serology studies in September 2007 documented ANÍBAL negative and factor II analysis prothrombin gene mutation was negative. She has an IVC filter in place. Valente Rendon presented to the ER at [facility] on 7/24/2018 with complaints of significant left lower extremity edema and pain that had been persistent for approximately one week prior to presentation. Valente Rendon has been on chronic anticoagulation with warfarin since 2003 and was subtherapeutic at presentation with an INR of 1.27 on 7/24/2018. Review of INRs available through Epic dating back to 1/31/2018 to 7/24/2018 indicated Valente Rendon has remained routinely subtherapeutic. She reported financial restraint and was unable to obtain warfarin. During her hospitalization, she was placed on a heparin drip and bridged with warfarin. LOWER EXTREMITY BILATERAL VENOUS DUPLEX On 7/25/2018 - chronic deep vein thrombosis in the right lower extremity involving the femoral and popliteal, veins. Lupus anticoagulant not detected on 7/26/2018. Recurrent DVT to Left lower extremity occurred most likely due to subtherapeutic INR related to noncompliance, do not suspect warfarin failure. Valente Rendon indicates that she monitors her PT/INR at home and calls Dr. Alejandra Sos office with the results for dosing changes related to her warfarin. Hematology history #2- Iron deficiency anemia  Valente Rendon has a history of iron deficiency anemia dating back to  1993. Her deficiency may also be exacerbated by absorption, she had aRouxen-Y gastric bypass surgery 9/12/2003. She has received IV iron replacement with Venofer on several occasions under the direction of Dr. Tg Calixto. Valente Rendon had a bone marrow aspiration and biopsy was completed on 8/31/2007 documented left shifted myeloid maturation and dysmaturation.  Myeloid cells Osteoarthritis     Osteoporosis     Other iron deficiency anemias     Pernicious anemia     PUPP (pruritic urticarial papules and plaques of pregnancy)     Right leg numbness     Right sided sciatica     Sarcoidosis     with liver involvement    Sciatica     Secondary hyperparathyroidism ([address]     Shingles     Shortness of breath     Sleep apnea     Stomach ulcer     Syncope     Type II diabetes mellitus with nephropathy (HCC)     Visual loss, one eye        Past Surgical History:    Past Surgical History:   Procedure Laterality Date    APPENDECTOMY      CARDIAC [facility]HETER[facility]    EF over 60%    CHOLECYSTECTOMY      COLONOSCOPY  2/2010    negative    COLONOSCOPY  2/22/10    Dr Sarita Lacy    COLONOSCOPY  4/1/16    Dr LAKHWINDER Horvath-internal hemorrhoids, 5 yr recall    EYE SURGERY      Cyst on Right eye    EYE SURGERY      GASTRIC BYPASS SURGERY      GASTRIC BYPASS SURGERY      HERNIA REPAIR      HYSTERECTOMY      Complete    HYSTERECTOMY      Partial - because had a tubal pregnancy.      INCONTINENCE SURGERY      Bladder Sling    OTHER SURGICAL HISTORY      IVC filter    PACEMAKER INSERTION      PACEMAKER PLACEMENT      medtronic    [place] TOTAL KNEE ARTHROPLASTY Right 3/26/2018    TOTAL KNEE REPLACEMENT COMPLEX PRIMARY performed by Marce Blanc MD at [address] [place] [address]      SPLENECTOMY      KRISTEL AND BSO      TONSILLECTOMY AND ADENOIDECTOMY      UPPER GASTROINTESTINAL ENDOSCOPY  12/2011    gerd s/p gastric bypass    UPPER GASTROINTESTINAL ENDOSCOPY  2/2014    normal s/p gastric bypass    UPPER GASTROINTESTINAL ENDOSCOPY  2/2010    biopsy neg Barretts, chronic reflux esophagitis s/p gastric bypass    UPPER GASTROINTESTINAL ENDOSCOPY  7/2006    unremarkable s/p gastric bypass    UPPER GASTROINTESTINAL ENDOSCOPY  8/10/15    Dr Roxanne De La Vega UPPER GASTROINTESTINAL ENDOSCOPY  4/1/16    Dr Isiah Wong fibromyalgia    Diabetes Paternal Aunt     Esophageal Cancer Neg Hx     Liver Cancer Neg Hx     Liver Disease Neg Hx     Stomach Cancer Neg Hx     Rectal Cancer Neg Hx        Vitals:  Vitals:    06/18/20 1039   BP: 126/76   Pulse: 85   Temp: 98.1 °F (36.7 °C)   SpO2: 98%   Weight: 235 lb 14.4 oz (107 kg)   Height: 5' 7\" (1.702 m)        Subjective   REVIEW OF SYSTEMS:   Review of Systems   Constitutional: Positive for fatigue (Chronic). Negative for chills, diaphoresis and fever. HENT: Negative. Negative for congestion, ear pain, hearing loss, nosebleeds, sore throat and tinnitus. Eyes: Negative. Negative for pain, discharge and redness. Respiratory: Negative. Negative for cough, shortness of breath and wheezing. Cardiovascular: Positive for leg swelling (Chronic/bilateral). Negative for chest pain and palpitations. Gastrointestinal: Negative. Negative for abdominal pain, blood in stool, constipation, diarrhea, nausea and vomiting. Endocrine: Negative for polydipsia. Genitourinary: Negative for dysuria, flank pain, frequency, hematuria and urgency. Musculoskeletal: Negative. Negative for back pain, myalgias and neck pain. Skin: Negative. Negative for rash. Neurological: Negative. Negative for dizziness, tremors, seizures, weakness and headaches. Hematological: Does not bruise/bleed easily. Psychiatric/Behavioral: Negative. The patient is not nervous/anxious. Objective   PHYSICAL EXAM:  Physical Exam  Vitals signs reviewed. Constitutional:       General: She is not in acute distress. Appearance: She is well-developed. She is not diaphoretic. HENT:      Head: Normocephalic and atraumatic. Mouth/Throat:      Pharynx: Uvula midline. Tonsils: No tonsillar exudate. Eyes:      General: Lids are normal. No scleral icterus. Right eye: No discharge. Left eye: No discharge.       Conjunctiva/sclera: Conjunctivae normal.      Pupils: Pupils are

## 2020-07-02 ENCOUNTER — ANTI-COAG VISIT (OUTPATIENT)
Dept: INTERNAL MEDICINE | Age: 70
End: 2020-07-02
Payer: MEDICARE

## 2020-07-02 LAB — INR BLD: 1.9

## 2020-07-02 PROCEDURE — 93793 ANTICOAG MGMT PT WARFARIN: CPT | Performed by: INTERNAL MEDICINE

## 2020-07-02 NOTE — PROGRESS NOTES
HOME MONITORING REPORT    INR today:   Results for orders placed or performed in visit on 07/02/20   Protime-INR   Result Value Ref Range    INR 1.90        INR Goal: 2.0-3.0    Dosing Plan  As of 7/2/2020    TTR:   18.8 % (2.2 y)   Full warfarin instructions:   7.5 mg every day              PLAN:PATIENT NOTIFIED TO  CONTINUE CURRENT DOSE AND RECHECK IN ONE WEEK. Electronically signed by Trevon Lizama MD on 7/2/2020 at 2:35 PM      I have reviewed nursing plan for Coumadin management and agree with plan.

## 2020-07-07 RX ORDER — CLOBETASOL PROPIONATE 0.5 MG/G
CREAM TOPICAL
Qty: 3 TUBE | Refills: 3 | Status: SHIPPED | OUTPATIENT
Start: 2020-07-07 | End: 2022-02-14 | Stop reason: SDUPTHER

## 2020-07-07 RX ORDER — CALCIPOTRIENE 50 UG/G
CREAM TOPICAL
Qty: 3 TUBE | Refills: 3 | Status: SHIPPED | OUTPATIENT
Start: 2020-07-07 | End: 2022-03-01 | Stop reason: SDUPTHER

## 2020-07-07 RX ORDER — CYANOCOBALAMIN 1000 UG/ML
1000 INJECTION INTRAMUSCULAR; SUBCUTANEOUS ONCE
Qty: 1 ML | Refills: 11 | Status: SHIPPED | OUTPATIENT
Start: 2020-07-07 | End: 2020-11-20

## 2020-07-07 NOTE — TELEPHONE ENCOUNTER
Karrie Hannon called requesting a refill of the below medication which has been pended for you:     Requested Prescriptions     Pending Prescriptions Disp Refills    calcipotriene (DOVONEX) 0.005 % cream 3 Tube 3     Sig: Use topically as needed    clobetasol (TEMOVATE) 0.05 % cream 3 Tube 3     Sig: Apply topically 2 times daily.        Last Appointment Date: 3/6/2020  Next Appointment Date: 7/6/2020    Allergies   Allergen Reactions    Insect Extract Allergy Skin Test Anaphylaxis     Bee stings    Lactose Intolerance (Gi)     Prednisone      Headache and upset stomach    Zanaflex [Tizanidine Hcl]      Passed out lost control of body functions    Lortab [Hydrocodone-Acetaminophen] Nausea And Vomiting     Sweats, weak, nausea and vomiting    Other Nausea And Vomiting     Opiates------sweating, weak        Oxycodone-Acetaminophen Nausea And Vomiting     Sweating and vomiting     Ultram [Tramadol Hcl] Nausea And Vomiting     Sweating, weak, nausea and vomiting

## 2020-07-14 DIAGNOSIS — E03.9 HYPOTHYROIDISM, UNSPECIFIED TYPE: ICD-10-CM

## 2020-07-14 DIAGNOSIS — E11.21 TYPE II DIABETES MELLITUS WITH NEPHROPATHY (HCC): ICD-10-CM

## 2020-07-14 DIAGNOSIS — E55.9 VITAMIN D DEFICIENCY: ICD-10-CM

## 2020-07-14 DIAGNOSIS — E53.8 B12 DEFICIENCY: ICD-10-CM

## 2020-07-14 LAB
ALBUMIN SERPL-MCNC: 4.1 G/DL (ref 3.5–5.2)
ALP BLD-CCNC: 78 U/L (ref 35–104)
ALT SERPL-CCNC: 8 U/L (ref 5–33)
ANION GAP SERPL CALCULATED.3IONS-SCNC: 14 MMOL/L (ref 7–19)
AST SERPL-CCNC: 18 U/L (ref 5–32)
BASOPHILS ABSOLUTE: 0 K/UL (ref 0–0.2)
BASOPHILS RELATIVE PERCENT: 0.8 % (ref 0–1)
BILIRUB SERPL-MCNC: 0.4 MG/DL (ref 0.2–1.2)
BUN BLDV-MCNC: 28 MG/DL (ref 8–23)
CALCIUM SERPL-MCNC: 9.2 MG/DL (ref 8.8–10.2)
CHLORIDE BLD-SCNC: 107 MMOL/L (ref 98–111)
CHOLESTEROL, TOTAL: 210 MG/DL (ref 160–199)
CO2: 21 MMOL/L (ref 22–29)
CREAT SERPL-MCNC: 1.1 MG/DL (ref 0.5–0.9)
CREATININE URINE: 256 MG/DL (ref 4.2–622)
EOSINOPHILS ABSOLUTE: 0.1 K/UL (ref 0–0.6)
EOSINOPHILS RELATIVE PERCENT: 2.5 % (ref 0–5)
GFR AFRICAN AMERICAN: >59
GFR NON-AFRICAN AMERICAN: 49
GLUCOSE BLD-MCNC: 80 MG/DL (ref 74–109)
HBA1C MFR BLD: 5.5 % (ref 4–6)
HCT VFR BLD CALC: 37.4 % (ref 37–47)
HDLC SERPL-MCNC: 81 MG/DL (ref 65–121)
HEMOGLOBIN: 11.7 G/DL (ref 12–16)
IMMATURE GRANULOCYTES #: 0 K/UL
INR BLD: 0.8
LDL CHOLESTEROL CALCULATED: 109 MG/DL
LYMPHOCYTES ABSOLUTE: 1.6 K/UL (ref 1.1–4.5)
LYMPHOCYTES RELATIVE PERCENT: 32.9 % (ref 20–40)
MCH RBC QN AUTO: 29.2 PG (ref 27–31)
MCHC RBC AUTO-ENTMCNC: 31.3 G/DL (ref 33–37)
MCV RBC AUTO: 93.3 FL (ref 81–99)
MICROALBUMIN UR-MCNC: <1.2 MG/DL (ref 0–19)
MICROALBUMIN/CREAT UR-RTO: NORMAL MG/G
MONOCYTES ABSOLUTE: 0.5 K/UL (ref 0–0.9)
MONOCYTES RELATIVE PERCENT: 10.7 % (ref 0–10)
NEUTROPHILS ABSOLUTE: 2.6 K/UL (ref 1.5–7.5)
NEUTROPHILS RELATIVE PERCENT: 52.9 % (ref 50–65)
PDW BLD-RTO: 16.1 % (ref 11.5–14.5)
PLATELET # BLD: 175 K/UL (ref 130–400)
PMV BLD AUTO: 12.2 FL (ref 9.4–12.3)
POTASSIUM SERPL-SCNC: 4.2 MMOL/L (ref 3.5–5)
RBC # BLD: 4.01 M/UL (ref 4.2–5.4)
SODIUM BLD-SCNC: 142 MMOL/L (ref 136–145)
TOTAL PROTEIN: 7.4 G/DL (ref 6.6–8.7)
TRIGL SERPL-MCNC: 101 MG/DL (ref 0–149)
TSH SERPL DL<=0.05 MIU/L-ACNC: 4.39 UIU/ML (ref 0.27–4.2)
VITAMIN B-12: 217 PG/ML (ref 211–946)
VITAMIN D 25-HYDROXY: 15.8 NG/ML
WBC # BLD: 4.8 K/UL (ref 4.8–10.8)

## 2020-07-15 ENCOUNTER — ANTI-COAG VISIT (OUTPATIENT)
Dept: INTERNAL MEDICINE | Age: 70
End: 2020-07-15
Payer: MEDICARE

## 2020-07-15 PROCEDURE — 93793 ANTICOAG MGMT PT WARFARIN: CPT | Performed by: INTERNAL MEDICINE

## 2020-07-15 NOTE — PROGRESS NOTES
HOME MONITORING REPORT    INR today:   Results for orders placed or performed in visit on 07/15/20   Protime-INR   Result Value Ref Range    INR 0.80        INR Goal: 2.0-3.0    Dosing Plan  As of 7/15/2020    TTR:   18.5 % (2.2 y)   Full warfarin instructions:   7.5 mg every day            Pt reporsts missing 3 doses and eating greens. PLAN:   Same dose, take regularly, Check in a week. Electronically signed by Christina Rodriguez MD on 7/15/2020 at 9:02 AM    I have reviewed nursing plan for Coumadin management and agree with plan.

## 2020-07-17 ENCOUNTER — OFFICE VISIT (OUTPATIENT)
Dept: INTERNAL MEDICINE | Age: 70
End: 2020-07-17
Payer: MEDICARE

## 2020-07-17 VITALS
DIASTOLIC BLOOD PRESSURE: 100 MMHG | SYSTOLIC BLOOD PRESSURE: 138 MMHG | HEART RATE: 86 BPM | HEIGHT: 67 IN | RESPIRATION RATE: 20 BRPM | OXYGEN SATURATION: 97 % | WEIGHT: 240.5 LBS | BODY MASS INDEX: 37.75 KG/M2

## 2020-07-17 PROBLEM — F33.2 SEVERE EPISODE OF RECURRENT MAJOR DEPRESSIVE DISORDER, WITHOUT PSYCHOTIC FEATURES (HCC): Status: ACTIVE | Noted: 2020-07-17

## 2020-07-17 PROCEDURE — 4040F PNEUMOC VAC/ADMIN/RCVD: CPT | Performed by: INTERNAL MEDICINE

## 2020-07-17 PROCEDURE — G8399 PT W/DXA RESULTS DOCUMENT: HCPCS | Performed by: INTERNAL MEDICINE

## 2020-07-17 PROCEDURE — 2022F DILAT RTA XM EVC RTNOPTHY: CPT | Performed by: INTERNAL MEDICINE

## 2020-07-17 PROCEDURE — 1090F PRES/ABSN URINE INCON ASSESS: CPT | Performed by: INTERNAL MEDICINE

## 2020-07-17 PROCEDURE — 1123F ACP DISCUSS/DSCN MKR DOCD: CPT | Performed by: INTERNAL MEDICINE

## 2020-07-17 PROCEDURE — G8417 CALC BMI ABV UP PARAM F/U: HCPCS | Performed by: INTERNAL MEDICINE

## 2020-07-17 PROCEDURE — 3017F COLORECTAL CA SCREEN DOC REV: CPT | Performed by: INTERNAL MEDICINE

## 2020-07-17 PROCEDURE — 99214 OFFICE O/P EST MOD 30 MIN: CPT | Performed by: INTERNAL MEDICINE

## 2020-07-17 PROCEDURE — 1036F TOBACCO NON-USER: CPT | Performed by: INTERNAL MEDICINE

## 2020-07-17 PROCEDURE — 3044F HG A1C LEVEL LT 7.0%: CPT | Performed by: INTERNAL MEDICINE

## 2020-07-17 PROCEDURE — G8427 DOCREV CUR MEDS BY ELIG CLIN: HCPCS | Performed by: INTERNAL MEDICINE

## 2020-07-17 RX ORDER — ERGOCALCIFEROL 1.25 MG/1
50000 CAPSULE ORAL
Qty: 12 CAPSULE | Refills: 1 | Status: SHIPPED | OUTPATIENT
Start: 2020-07-20 | End: 2021-03-18 | Stop reason: ALTCHOICE

## 2020-07-17 RX ORDER — BACLOFEN 10 MG/1
10 TABLET ORAL 3 TIMES DAILY
Qty: 10 TABLET | Refills: 1 | Status: SHIPPED | OUTPATIENT
Start: 2020-07-17 | End: 2021-03-18 | Stop reason: SDUPTHER

## 2020-07-17 RX ORDER — CYANOCOBALAMIN 1000 UG/ML
1000 INJECTION INTRAMUSCULAR; SUBCUTANEOUS ONCE
Status: COMPLETED | OUTPATIENT
Start: 2020-07-17 | End: 2022-01-18

## 2020-07-17 NOTE — PROGRESS NOTES
pregnancy.      INCONTINENCE SURGERY      Bladder Sling    OTHER SURGICAL HISTORY      IVC filter    PACEMAKER INSERTION      PACEMAKER PLACEMENT      medtronic    [place] TOTAL KNEE ARTHROPLASTY Right 3/26/2018    TOTAL KNEE REPLACEMENT COMPLEX PRIMARY performed by Gato Shin MD at [facility]      SPLENECTOMY      KRISTEL AND BSO      TONSILLECTOMY AND ADENOIDECTOMY      UPPER GASTROINTESTINAL ENDOSCOPY  12/2011    gerd s/p gastric bypass    UPPER GASTROINTESTINAL ENDOSCOPY  2/2014    normal s/p gastric bypass    UPPER GASTROINTESTINAL ENDOSCOPY  2/2010    biopsy neg Barretts, chronic reflux esophagitis s/p gastric bypass    UPPER GASTROINTESTINAL ENDOSCOPY  7/2006    unremarkable s/p gastric bypass    UPPER GASTROINTESTINAL ENDOSCOPY  8/10/15    Dr Lesly Hernandez UPPER GASTROINTESTINAL ENDOSCOPY  4/1/16    Dr Pamela Desouza    UPPER GASTROINTESTINAL ENDOSCOPY N/A 4/1/2016    EGD ESOPHAGOGASTRODUODENOSCOPY performed by Daniel Montoya MD at [address] [facility]    [facility] Right 2003      Social History     Socioeconomic History    Marital status:      Spouse name: None    Number of children: None    Years of education: None    Highest education level: None   Occupational History     Employer: [employer]    Social Needs    Financial resource strain: None    Food insecurity     Worry: None     Inability: None    Transportation needs     Medical: None     Non-medical: None   Tobacco Use    Smoking status: Never Smoker    Smokeless tobacco: Never Used   Substance and Sexual Activity    Alcohol use: No    Drug use: No    Sexual activity: Not Currently   Lifestyle    Physical activity     Days per week: None     Minutes per session: None    Stress: None   Relationships    Social connections     Talks on phone: None     Gets together: None     Attends Buddhist service: None     Active member of club or organization: None     Attends meetings of clubs or organizations: None     Relationship status: None    Intimate partner violence     Fear of current or ex partner: None     Emotionally abused: None     Physically abused: None     Forced sexual activity: None   Other Topics Concern    None   Social History Narrative    None      Family History   Problem Relation Age of Onset    Uterine Cancer Mother     Cervical Cancer Mother     Coronary Art Dis Mother     Heart Disease Father     Lung Cancer Father     Other Father         renal failure    Colon Cancer Brother     Colon Polyps Brother     Uterine Cancer Maternal Grandmother     Cervical Cancer Maternal Grandmother     Diabetes Maternal Grandmother     Cervical Cancer Sister     Other Sister         fibromyalgia    Diabetes Paternal Aunt     Esophageal Cancer Neg Hx     Liver Cancer Neg Hx     Liver Disease Neg Hx     Stomach Cancer Neg Hx     Rectal Cancer Neg Hx         Current Outpatient Medications   Medication Sig Dispense Refill    baclofen (LIORESAL) 10 MG tablet Take 1 tablet by mouth 3 times daily As needed for hip pain 10 tablet 1    [START ON 7/20/2020] vitamin D (ERGOCALCIFEROL) 1.25 MG (19897 UT) CAPS capsule Take 1 capsule by mouth Twice a Week 12 capsule 1    calcipotriene (DOVONEX) 0.005 % cream Use topically as needed 3 Tube 3    clobetasol (TEMOVATE) 0.05 % cream Apply topically 2 times daily. 3 Tube 3    fexofenadine (ALLEGRA) 180 MG tablet Take 1 tablet by mouth daily Indications:  Allergic Rhinitis 90 tablet 3    pantoprazole (PROTONIX) 40 MG tablet Take 1 tablet by mouth 2 times daily (before meals) 60 tablet 3    levothyroxine (SYNTHROID) 100 MCG tablet Take 1 tablet by mouth Daily 90 tablet 3    diclofenac sodium 1 % GEL Apply 4 g topically 4 times daily 4 Tube 1    ondansetron (ZOFRAN) 4 MG tablet Take 1 tablet by mouth daily as needed for Nausea or Vomiting 30 tablet 0    warfarin (COUMADIN) 7.5 MG tablet 2 tablets daily, DO NOT RESUME UNTIL YOU RECHECK YOUR INR ON THURSDAY AND DISCUSS WITH  tablet 3    cloNIDine (CATAPRES) 0.1 MG tablet Take 1 tablet by mouth 2 times daily As needed for BP greater than 170/110 60 tablet 3    Multiple Vitamins-Minerals (CENTRUM ADULTS) TABS Take 1 tablet by mouth daily      escitalopram (LEXAPRO) 20 MG tablet Take 1 tablet by mouth daily 90 tablet 2    lisinopril (PRINIVIL;ZESTRIL) 40 MG tablet Take 1 tablet by mouth daily 90 tablet 0    bumetanide (BUMEX) 2 MG tablet Take 1 tablet by mouth daily 30 tablet 5    tiotropium (SPIRIVA HANDIHALER) 18 MCG inhalation capsule Inhale 1 capsule into the lungs daily 90 capsule 1    potassium chloride (KLOR-CON M) 10 MEQ extended release tablet Take 1 tablet by mouth daily 90 tablet 3    ferrous gluconate (FERGON) 240 (27 Fe) MG tablet Take 1 tablet by mouth 3 times daily (with meals) 270 tablet 2    aspirin 81 MG tablet Take 81 mg by mouth daily      cyanocobalamin 1000 MCG/ML injection Inject 1 mL into the muscle once for 1 dose 1 mL 11    pregabalin (LYRICA) 75 MG capsule Take 1 capsule by mouth 3 times daily for 90 days.  90 capsule 2    diclofenac sodium (VOLTAREN) 1 % GEL Apply 2 g topically 2 times daily 3 Tube 3     Current Facility-Administered Medications   Medication Dose Route Frequency Provider Last Rate Last Dose    cyanocobalamin injection 1,000 mcg  1,000 mcg Intramuscular Once Valeria Matson MD            Patient Active Problem List   Diagnosis    Vomiting    Burping    GERD (gastroesophageal reflux disease)    History of gastric bypass    Family history of colon cancer    Bloating    Enuresis    Nausea and vomiting    Stable angina ([address]    Encounter for current long-term use of anticoagulants    Primary osteoarthritis of right knee    Arthritis of knee    Essential hypertension    Hyperglycemia    MER (obstructive sleep apnea)    Slow transit constipation    Iron deficiency anemia    Restrictive airway disease    first came in. However, by the time she left her office, her blood pressure was at morbid acceptable range. 10.  B12 deficiency: Check B12 level with next lab draw. B12 injection given today    11. Anemia: Continue ferrous gluconate    12. Neuropathy: Stable. [place]i Appiah was seen today for hypothyroidism and diabetes. Diagnoses and all orders for this visit:    B12 deficiency  -     cyanocobalamin injection 1,000 mcg  -     Vitamin B12; Future    Neuropathy    Type 2 diabetes mellitus without complication, unspecified whether long term insulin use ([facility])  -      DIABETES FOOT EXAM  -     Diabetic Shoe  -     CBC Auto Differential; Future  -     Comprehensive Metabolic Panel; Future  -     Hemoglobin A1C; Future  -     Lipid Panel; Future  -     TSH without Reflex; Future  -     Microalbumin / Creatinine Urine Ratio; Future  -     Vitamin B12; Future    Severe episode of recurrent major depressive disorder, without psychotic features ([facility])    Vitamin D deficiency  -     Vitamin D 25 Hydroxy; Future    At high risk for falls    Other orders  -     baclofen (LIORESAL) 10 MG tablet; Take 1 tablet by mouth 3 times daily As needed for hip pain  -     vitamin D (ERGOCALCIFEROL) 1.25 MG (94287 UT) CAPS capsule; Take 1 capsule by mouth Twice a Week          Return in about 4 months (around 11/17/2020). Orders Placed This Encounter   Procedures    Diabetic Shoe     Diabetic shoes with 3 pairs of heat molded inserts.     CBC Auto Differential     Fast 12 hours     Standing Status:   Future     Standing Expiration Date:   7/17/2021    Comprehensive Metabolic Panel     Fasting 12 hours     Standing Status:   Future     Standing Expiration Date:   7/17/2021    Hemoglobin A1C     Fast 12 hours     Standing Status:   Future     Standing Expiration Date:   7/17/2021    Lipid Panel     Standing Status:   Future     Standing Expiration Date:   7/17/2021     Order Specific Question:   Is Patient Fasting?/# of Hours Answer:   12    TSH without Reflex     Fast 12 hours     Standing Status:   Future     Standing Expiration Date:   7/17/2021    Microalbumin / Creatinine Urine Ratio     Standing Status:   Future     Standing Expiration Date:   7/17/2021    Vitamin B12     Standing Status:   Future     Standing Expiration Date:   7/17/2021    Vitamin D 25 Hydroxy     Standing Status:   Future     Standing Expiration Date:   7/17/2021     DIABETES FOOT EXAM       José Luis Ngo MD   On the basis of positive falls risk screening, assessment and plan is as follows: home safety tips provided.

## 2020-07-17 NOTE — PATIENT INSTRUCTIONS

## 2020-07-18 ASSESSMENT — ENCOUNTER SYMPTOMS
STRIDOR: 0
BLOOD IN STOOL: 0
EYE DISCHARGE: 0
DIARRHEA: 0
VOICE CHANGE: 0
TROUBLE SWALLOWING: 0
ABDOMINAL DISTENTION: 0
SHORTNESS OF BREATH: 0
RHINORRHEA: 0
SINUS PAIN: 0
ABDOMINAL PAIN: 0
CHEST TIGHTNESS: 0
NAUSEA: 0
COUGH: 0
SORE THROAT: 0
EYE ITCHING: 0
COLOR CHANGE: 0
SINUS PRESSURE: 0
WHEEZING: 0
APNEA: 0
BACK PAIN: 1
CONSTIPATION: 0
VOMITING: 0

## 2020-07-23 ENCOUNTER — OFFICE VISIT (OUTPATIENT)
Dept: INTERNAL MEDICINE | Age: 70
End: 2020-07-23
Payer: MEDICARE

## 2020-07-23 VITALS
SYSTOLIC BLOOD PRESSURE: 168 MMHG | OXYGEN SATURATION: 99 % | HEART RATE: 68 BPM | DIASTOLIC BLOOD PRESSURE: 94 MMHG | WEIGHT: 234 LBS | BODY MASS INDEX: 36.73 KG/M2 | HEIGHT: 67 IN

## 2020-07-23 PROCEDURE — G8399 PT W/DXA RESULTS DOCUMENT: HCPCS | Performed by: INTERNAL MEDICINE

## 2020-07-23 PROCEDURE — G8427 DOCREV CUR MEDS BY ELIG CLIN: HCPCS | Performed by: INTERNAL MEDICINE

## 2020-07-23 PROCEDURE — 99213 OFFICE O/P EST LOW 20 MIN: CPT | Performed by: INTERNAL MEDICINE

## 2020-07-23 PROCEDURE — 3017F COLORECTAL CA SCREEN DOC REV: CPT | Performed by: INTERNAL MEDICINE

## 2020-07-23 PROCEDURE — 1123F ACP DISCUSS/DSCN MKR DOCD: CPT | Performed by: INTERNAL MEDICINE

## 2020-07-23 PROCEDURE — G8417 CALC BMI ABV UP PARAM F/U: HCPCS | Performed by: INTERNAL MEDICINE

## 2020-07-23 PROCEDURE — 4040F PNEUMOC VAC/ADMIN/RCVD: CPT | Performed by: INTERNAL MEDICINE

## 2020-07-23 PROCEDURE — 1036F TOBACCO NON-USER: CPT | Performed by: INTERNAL MEDICINE

## 2020-07-23 PROCEDURE — 1090F PRES/ABSN URINE INCON ASSESS: CPT | Performed by: INTERNAL MEDICINE

## 2020-07-23 ASSESSMENT — ENCOUNTER SYMPTOMS
SINUS PRESSURE: 0
TROUBLE SWALLOWING: 0
CHEST TIGHTNESS: 0
ABDOMINAL DISTENTION: 0
NAUSEA: 0
CONSTIPATION: 0
ABDOMINAL PAIN: 0
BACK PAIN: 1
COUGH: 0
SHORTNESS OF BREATH: 0
SORE THROAT: 0
VOICE CHANGE: 0
VOMITING: 0
STRIDOR: 0
BLOOD IN STOOL: 0
EYE DISCHARGE: 0
RHINORRHEA: 0
DIARRHEA: 0
SINUS PAIN: 0
EYE ITCHING: 0
APNEA: 0
WHEEZING: 0
COLOR CHANGE: 0

## 2020-07-23 NOTE — PROGRESS NOTES
deficiency anemias     Pernicious anemia     PUPP (pruritic urticarial papules and plaques of pregnancy)     Right leg numbness     Right sided sciatica     Sarcoidosis     with liver involvement    Sciatica     Secondary hyperparathyroidism ([address]     Shingles     Shortness of breath     Sleep apnea     Stomach ulcer     Syncope     Type II diabetes mellitus with nephropathy (HCC)     Visual loss, one eye       Past Surgical History:   Procedure Laterality Date    APPENDECTOMY      CARDIAC CATHETERIZATION  10/21/13  [address]    EF over 60%    CHOLECYSTECTOMY      COLONOSCOPY  2/2010    negative    COLONOSCOPY  2/22/10    Dr Khadar Recio    COLONOSCOPY  4/1/16    Dr LAKHWINDER Horvath-internal hemorrhoids, 5 yr recall    EYE SURGERY      Cyst on Right eye    EYE SURGERY      GASTRIC BYPASS SURGERY      GASTRIC BYPASS SURGERY      HERNIA REPAIR      HYSTERECTOMY      Complete    HYSTERECTOMY      Partial - because had a tubal pregnancy.      INCONTINENCE SURGERY      Bladder Sling    OTHER SURGICAL HISTORY      IVC filter    PACEMAKER INSERTION      PACEMAKER PLACEMENT      medtronic    [place] TOTAL KNEE ARTHROPLASTY Right 3/26/2018    TOTAL KNEE REPLACEMENT COMPLEX PRIMARY performed by Jina Bowen MD at [address] [place] [address]      SMALL INTESTINE SURGERY      SPLENECTOMY      KRISTEL AND BSO      TONSILLECTOMY AND ADENOIDECTOMY      UPPER GASTROINTESTINAL ENDOSCOPY  12/2011    gerd s/p gastric bypass    UPPER GASTROINTESTINAL ENDOSCOPY  2/2014    normal s/p gastric bypass    UPPER GASTROINTESTINAL ENDOSCOPY  2/2010    biopsy neg Barretts, chronic reflux esophagitis s/p gastric bypass    UPPER GASTROINTESTINAL ENDOSCOPY  7/2006    unremarkable s/p gastric bypass    UPPER GASTROINTESTINAL ENDOSCOPY  8/10/15    Dr Clark Dys ENDOSCOPY  4/1/16    Dr Megan Toussaint N/A 4/1/2016    EGD ESOPHAGOGASTRODUODENOSCOPY performed [facility] MD at [facility]    VENA CAVA FILTER PLACEMENT Right 2003      Social History     Socioeconomic History    Marital status:      Spouse name: None    Number of children: None    Years of education: None    Highest education level: None   Occupational History     Employer: [employer]    Social Needs    Financial resource strain: None    Food insecurity     Worry: None     Inability: None    Transportation needs     Medical: None     Non-medical: None   Tobacco Use    Smoking status: Never Smoker    Smokeless tobacco: Never Used   Substance and Sexual Activity    Alcohol use: No    Drug use: No    Sexual activity: Not Currently   Lifestyle    Physical activity     Days per week: None     Minutes per session: None    Stress: None   Relationships    Social connections     Talks on phone: None     Gets together: None     Attends Yazidism service: None     Active member of club or organization: None     Attends meetings of clubs or organizations: None     Relationship status: None    Intimate partner violence     Fear of current or ex partner: None     Emotionally abused: None     Physically abused: None     Forced sexual activity: None   Other Topics Concern    None   Social History Narrative    None      Family History   Problem Relation Age of Onset    Uterine Cancer Mother     Cervical Cancer Mother     Coronary Art Dis Mother     Heart Disease Father     Lung Cancer Father     Other Father         renal failure    Colon Cancer Brother     Colon Polyps Brother     Uterine Cancer Maternal Grandmother     Cervical Cancer Maternal Grandmother     Diabetes Maternal Grandmother     Cervical Cancer Sister     Other Sister         fibromyalgia    Diabetes Paternal Aunt     Esophageal Cancer Neg Hx     Liver Cancer Neg Hx     Liver Disease Neg Hx     Stomach Cancer Neg Hx     Rectal Cancer Neg Hx         Current Outpatient Medications   Medication Sig Dispense Refill    baclofen (LIORESAL) 10 MG tablet Take 1 tablet by mouth 3 times daily As needed for hip pain 10 tablet 1    vitamin D (ERGOCALCIFEROL) 1.25 MG (69206 UT) CAPS capsule Take 1 capsule by mouth Twice a Week 12 capsule 1    calcipotriene (DOVONEX) 0.005 % cream Use topically as needed 3 Tube 3    clobetasol (TEMOVATE) 0.05 % cream Apply topically 2 times daily. 3 Tube 3    cyanocobalamin 1000 MCG/ML injection Inject 1 mL into the muscle once for 1 dose 1 mL 11    fexofenadine (ALLEGRA) 180 MG tablet Take 1 tablet by mouth daily Indications:  Allergic Rhinitis 90 tablet 3    pantoprazole (PROTONIX) 40 MG tablet Take 1 tablet by mouth 2 times daily (before meals) 60 tablet 3    levothyroxine (SYNTHROID) 100 MCG tablet Take 1 tablet by mouth Daily 90 tablet 3    diclofenac sodium 1 % GEL Apply 4 g topically 4 times daily 4 Tube 1    ondansetron (ZOFRAN) 4 MG tablet Take 1 tablet by mouth daily as needed for Nausea or Vomiting 30 tablet 0    warfarin (COUMADIN) 7.5 MG tablet 2 tablets daily, DO NOT RESUME UNTIL YOU RECHECK YOUR INR ON THURSDAY AND DISCUSS WITH  tablet 3    cloNIDine (CATAPRES) 0.1 MG tablet Take 1 tablet by mouth 2 times daily As needed for BP greater than 170/110 60 tablet 3    Multiple Vitamins-Minerals (CENTRUM ADULTS) TABS Take 1 tablet by mouth daily      escitalopram (LEXAPRO) 20 MG tablet Take 1 tablet by mouth daily 90 tablet 2    lisinopril (PRINIVIL;ZESTRIL) 40 MG tablet Take 1 tablet by mouth daily 90 tablet 0    bumetanide (BUMEX) 2 MG tablet Take 1 tablet by mouth daily 30 tablet 5    tiotropium (SPIRIVA HANDIHALER) 18 MCG inhalation capsule Inhale 1 capsule into the lungs daily 90 capsule 1    potassium chloride (KLOR-CON M) 10 MEQ extended release tablet Take 1 tablet by mouth daily 90 tablet 3    ferrous gluconate (FERGON) 240 (27 Fe) MG tablet Take 1 tablet by mouth 3 times daily (with meals) 270 tablet 2    aspirin 81 MG tablet Take 81 mg by mouth daily      pregabalin (LYRICA) 75 MG capsule Take 1 capsule by mouth 3 times daily for 90 days. 90 capsule 2     Current Facility-Administered Medications   Medication Dose Route Frequency Provider Last Rate Last Dose    cyanocobalamin injection 1,000 mcg  1,000 mcg Intramuscular Once Rosalie Rolle MD            Patient Active Problem List   Diagnosis    Vomiting    Burping    GERD (gastroesophageal reflux disease)    History of gastric bypass    Family history of colon cancer    Bloating    Enuresis    Nausea and vomiting    Stable angina ([address]    Encounter for current long-term use of anticoagulants    Primary osteoarthritis of right knee    Arthritis of knee    Essential hypertension    Hyperglycemia    MER (obstructive sleep apnea)    Slow transit constipation    Iron deficiency anemia    Restrictive airway disease    DVT, lower extremity, proximal, acute, unspecified laterality ([address]    H/O systemic lupus erythematosus (SLE) ([address]    Anticoagulated on Coumadin    Burn of abdomen wall, second degree, initial encounter    Postmenopausal osteoporosis    Type II diabetes mellitus with nephropathy ([address]    Cellulitis of left lower extremity    Pacemaker    History of DVT (deep vein thrombosis)    Lupus anticoagulant disorder ([address]    History of pulmonary embolism    Thrombocytopenia ([address]    Hypothyroidism    Morbidly obese (HCC)    Asthmatic bronchitis without complication    Gastroenteritis    Colic    Abdominal pain    Intractable nausea and vomiting    Severe episode of recurrent major depressive disorder, without psychotic features ([address]        Review of Systems   Constitutional: Positive for fatigue. Negative for activity change, appetite change, chills, diaphoresis, fever and unexpected weight change.    HENT: Negative for congestion, ear pain, hearing loss, nosebleeds, postnasal drip, rhinorrhea, sinus pressure, sinus pain, sneezing, sore throat, tinnitus, trouble swallowing and voice change. Eyes: Negative for discharge and itching. Watery eyes   Respiratory: Negative for apnea, cough, chest tightness, shortness of breath, wheezing and stridor. Cardiovascular: Positive for leg swelling. Negative for chest pain and palpitations. Left leg swelling; chronic secondary to DVT. Swelling to her bilateral lower extremities   Gastrointestinal: Negative for abdominal distention, abdominal pain, blood in stool, constipation, diarrhea, nausea and vomiting. Endocrine: Negative for cold intolerance, heat intolerance, polydipsia, polyphagia and polyuria. Genitourinary: Negative for difficulty urinating, dysuria, flank pain, frequency, hematuria and urgency. Musculoskeletal: Positive for arthralgias and back pain. Negative for gait problem, joint swelling, myalgias, neck pain and neck stiffness. She had bilateral knee pain. Right hip pain   Skin: Negative for color change, pallor, rash and wound. Allergic/Immunologic: Positive for environmental allergies. Negative for food allergies and immunocompromised state. Neurological: Negative for dizziness, tremors, seizures, syncope, facial asymmetry, speech difficulty, weakness, light-headedness, numbness and headaches. Hematological: Negative for adenopathy. Does not bruise/bleed easily. Psychiatric/Behavioral: Positive for dysphoric mood. Negative for agitation, confusion, decreased concentration and hallucinations. The patient is not nervous/anxious and is not hyperactive. BP (!) 168/100 (Site: Right Upper Arm) Comment: states she has not taken bp medication today  Pulse 68   Ht 5' 7\" (1.702 m)   Wt 234 lb (106.1 kg)   SpO2 99%   BMI 36.65 kg/m²   Physical Exam  Vitals signs and nursing note reviewed. Constitutional:       General: She is not in acute distress. Appearance: Normal appearance. She is well-developed. She is obese. She is not ill-appearing, toxic-appearing or diaphoretic.    HENT: Head: Normocephalic and atraumatic. Right Ear: Tympanic membrane, ear canal and external ear normal. There is no impacted cerumen. Left Ear: Tympanic membrane, ear canal and external ear normal.      Nose: Nose normal. No congestion or rhinorrhea. Mouth/Throat:      Mouth: Mucous membranes are moist.      Pharynx: Oropharynx is clear. No oropharyngeal exudate or posterior oropharyngeal erythema. Eyes:      General: No scleral icterus. Right eye: No discharge. Left eye: No discharge. Extraocular Movements: Extraocular movements intact. Conjunctiva/sclera: Conjunctivae normal.      Pupils: Pupils are equal, round, and reactive to light. Neck:      Musculoskeletal: Normal range of motion. No neck rigidity or muscular tenderness. Thyroid: No thyromegaly. Vascular: No carotid bruit or JVD. Trachea: No tracheal deviation. Cardiovascular:      Rate and Rhythm: Normal rate and regular rhythm. Pulses: Normal pulses. Heart sounds: Normal heart sounds. No murmur. No friction rub. No gallop. Pulmonary:      Effort: Pulmonary effort is normal. No respiratory distress. Breath sounds: Normal breath sounds. No stridor. No wheezing, rhonchi or rales. Chest:      Chest wall: No tenderness. Abdominal:      General: Abdomen is flat. Bowel sounds are normal. There is no distension. Palpations: Abdomen is soft. There is no mass. Tenderness: There is no abdominal tenderness. There is no right CVA tenderness, left CVA tenderness, guarding or rebound. Hernia: No hernia is present. Musculoskeletal: Normal range of motion. General: No swelling, tenderness, deformity or signs of injury. Lumbar back: She exhibits pain and spasm. She exhibits normal range of motion, no bony tenderness and no swelling. Back:       Right lower leg: No edema. Left lower leg: No edema. Comments: Swelling to the lower extremities.  Does have some hip pain on palpation of the left hip    Visual inspection:  Deformity/amputation: absent  Skin lesions/pre-ulcerative calluses: present to bottom of the left foot  Edema: right- negative, left- negative    Sensory exam:  Monofilament sensation: normal  (minimum of 5 random plantar locations tested, avoiding callused areas - > 1 area with absence of sensation is + for neuropathy)    Plus at least one of the following:  Pulses: normal,   Pinprick: Intact  Proprioception: N/A  Vibration (128 Hz): N/A   Lymphadenopathy:      Cervical: No cervical adenopathy. Skin:     General: Skin is warm and dry. Capillary Refill: Capillary refill takes less than 2 seconds. Coloration: Skin is not jaundiced or pale. Findings: No bruising, erythema, lesion or rash. Neurological:      General: No focal deficit present. Mental Status: She is alert and oriented to person, place, and time. Mental status is at baseline. Cranial Nerves: No cranial nerve deficit. Sensory: No sensory deficit. Motor: No weakness or abnormal muscle tone. Coordination: Coordination normal.      Gait: Gait normal.      Deep Tendon Reflexes: Reflexes normal.   Psychiatric:         Mood and Affect: Mood normal. Mood is not anxious or depressed. Behavior: Behavior normal.         Thought Content: Thought content normal.         Judgment: Judgment normal.         No diagnosis found. ASSESSMENT/PLAN:    26-year-old woman here for follow-up    1. Hypertension: Difficult to assess. She has not had her blood pressure medicine today. She has not been taking her blood pressure at home. She has a long history of noncompliance with medications. I advised her to watch her blood pressure at home and call us if it gets above 140/90. There are no diagnoses linked to this encounter. No follow-ups on file. No orders of the defined types were placed in this encounter.       Valeria Matson MD

## 2020-08-14 ENCOUNTER — OFFICE VISIT (OUTPATIENT)
Dept: CARDIOLOGY | Facility: CLINIC | Age: 70
End: 2020-08-14

## 2020-08-14 VITALS
HEART RATE: 86 BPM | SYSTOLIC BLOOD PRESSURE: 140 MMHG | DIASTOLIC BLOOD PRESSURE: 90 MMHG | WEIGHT: 240 LBS | BODY MASS INDEX: 37.67 KG/M2 | HEIGHT: 67 IN | OXYGEN SATURATION: 99 %

## 2020-08-14 DIAGNOSIS — R60.9 PERIPHERAL EDEMA: ICD-10-CM

## 2020-08-14 DIAGNOSIS — E78.2 MIXED HYPERLIPIDEMIA: ICD-10-CM

## 2020-08-14 DIAGNOSIS — I10 ESSENTIAL HYPERTENSION: ICD-10-CM

## 2020-08-14 DIAGNOSIS — Z95.0 PACEMAKER: Primary | ICD-10-CM

## 2020-08-14 PROCEDURE — 93000 ELECTROCARDIOGRAM COMPLETE: CPT | Performed by: INTERNAL MEDICINE

## 2020-08-14 PROCEDURE — 99214 OFFICE O/P EST MOD 30 MIN: CPT | Performed by: INTERNAL MEDICINE

## 2020-08-14 NOTE — PROGRESS NOTES
"  Referring Provider: Zora Jackson MD    Reason for Follow-up Visit: pacemaker    Subjective .   Chief Complaint:   Chief Complaint   Patient presents with   • Follow-up     hospital fu s/p gen change on pacemaker   • Pacemaker Check     pt states that sometimes she feels like the incision seems to \"buldge\" out.  she feels a twinge pain at times.  other than that she is doing well.   • Hypertension     pt states she has not been checking bp at home.  she stated that todays bp was good for her.   • Hyperlipidemia     last lab done 3/5/2020  on no medication       History of present illness:  Veronica Stewart is a 70 y.o. yo female with history of SSS, s/p pacemaker with recent generator change. Denies any CP.        History  Past Medical History:   Diagnosis Date   • Anxiety    • Arrhythmia    • Blood clot in vein 05/2018    Hospitalized    • Arben-tachy syndrome (CMS/HCC)    • Bradycardia    • Breath shortness    • Burn     left leg   • Cardiac pacemaker     11/09 [facility]   • Chest pain    • Chronic fatigue    • Depression    • Diabetes mellitus (CMS/HCC)    • Dizziness    • Fatigue    • Follow-up exam    • Heart burn    • Hypercoagulable state, primary (CMS/HCC)     LUPUS ANTI-COAG   • Hyperlipidemia    • Hypertension    • Liver disease    • Shortness of breath    • Sleep apnea     complex.  using a c-pap machine   • Stroke (CMS/HCC)    • Syncope    • Tachy-arben syndrome (CMS/HCC)    ,   Past Surgical History:   Procedure Laterality Date   • APPENDECTOMY     • CARDIAC ELECTROPHYSIOLOGY PROCEDURE N/A 2/26/2020    Procedure: PPM generator change - dual;  Surgeon: Ronny Lowe MD;  Location: [facility] CATH INVASIVE LOCATION;  Service: Cardiology;  Laterality: N/A;   • CATARACT EXTRACTION     • CHOLECYSTECTOMY     • HERNIA REPAIR     • HYSTERECTOMY     • INSERT / REPLACE / REMOVE PACEMAKER     • OOPHORECTOMY     • PACEMAKER IMPLANTATION     • REPLACEMENT TOTAL KNEE Right 03/26/2018    Dr doherty    • " TRAM-EN-Y PROCEDURE  2002    Dr Dahiana Colon [facility]   • SPLENECTOMY     ,   Family History   Problem Relation Age of Onset   • Coronary artery disease Mother    • Hyperlipidemia Mother    • Anemia Mother    • Cervical cancer Mother    • Kidney failure Father    • Heart failure Father    • Fibromyalgia Sister    • Anemia Sister    • Clotting disorder Sister    • Alcohol abuse Brother    • Cancer Maternal Grandmother    • Diabetes Maternal Grandmother    • Heart failure Maternal Grandfather    • No Known Problems Paternal Grandmother    • No Known Problems Paternal Grandfather    • Colon cancer Brother    ,   Social History     Tobacco Use   • Smoking status: Never Smoker   • Smokeless tobacco: Never Used   Substance Use Topics   • Alcohol use: Never     Frequency: Never   • Drug use: Never   ,     Medications  Current Outpatient Medications   Medication Sig Dispense Refill   • aspirin 81 MG EC tablet daily.     • baclofen (LIORESAL) 10 MG tablet TAKE 1 TABLET BY MOUTH THREE TIMES DAILY AS NEEDED FOR HIP PAIN     • bumetanide (BUMEX) 1 MG tablet Take 2 mg by mouth Daily.     • calcipotriene (DOVONEX) 0.005 % cream Apply  topically to the appropriate area as directed As Needed.     • clobetasol (TEMOVATE) 0.05 % cream Apply  topically 2 (Two) Times a Day.     • CloNIDine (CATAPRES) 0.1 MG tablet Take 0.1 mg by mouth 2 (Two) Times a Day As Needed.     • cyanocobalamin 1000 MCG/ML injection Inject 1,000 mcg into the shoulder, thigh, or buttocks Every 28 (Twenty-Eight) Days.     • Diclofenac Sodium 2 % solution Place 1 applicator on the skin as directed by provider 2 (Two) Times a Day.     • escitalopram (LEXAPRO) 20 MG tablet Take 1 tablet by mouth Daily. 30 tablet 2   • Ferrous Gluconate 239 (27 Fe) MG tablet Take 1 tablet by mouth 3 (Three) Times a Day.     • levothyroxine (SYNTHROID, LEVOTHROID) 100 MCG tablet Take 100 mcg by mouth Daily.     • lisinopril (PRINIVIL,ZESTRIL) 40 MG tablet Take 40 mg by mouth  "Daily.     • Multiple Vitamins-Minerals (MULTIVITAMIN WITH MINERALS) tablet tablet Take 1 tablet by mouth Daily.     • ondansetron (ZOFRAN) 4 MG tablet Take 4 mg by mouth Daily As Needed for Nausea or Vomiting.     • pantoprazole (PROTONIX) 40 MG EC tablet Take 40 mg by mouth Daily.     • potassium chloride (K-DUR,KLOR-CON) 10 MEQ CR tablet Take 10 mEq by mouth Daily.     • pregabalin (LYRICA) 75 MG capsule Take 75 mg by mouth 3 (Three) Times a Day.     • tiotropium (SPIRIVA HANDIHALER) 18 MCG per inhalation capsule Place 18 mcg into inhaler and inhale Daily.     • vitamin D (ERGOCALCIFEROL) 1.25 MG (17627 UT) capsule capsule Take 50,000 Units by mouth 2 (Two) Times a Week.     • warfarin (COUMADIN) 7.5 MG tablet Take 7.5 mg by mouth. Take 7.5mg  1 po daily managed by pcp Dr. Jackson     • polyethylene glycol (MIRALAX) packet Take 17 g by mouth As Needed.       No current facility-administered medications for this visit.        Allergies:  Bee venom; Insect extract allergy skin test; Percocet [oxycodone-acetaminophen]; Prednisone; Tizanidine hcl; Ultram [tramadol hcl]; Hydrocodone-acetaminophen; and Other    Review of Systems  Review of Systems   HENT: Negative for nosebleeds.    Cardiovascular: Negative for chest pain, claudication, dyspnea on exertion, irregular heartbeat, leg swelling, near-syncope, orthopnea, palpitations, paroxysmal nocturnal dyspnea and syncope.   Respiratory: Negative for cough, hemoptysis and shortness of breath.    Gastrointestinal: Negative for dysphagia, hematemesis and melena.   Genitourinary: Negative for hematuria.   All other systems reviewed and are negative.      Objective     Physical Exam:  /90   Pulse 86   Ht 170.2 cm (67\")   Wt 109 kg (240 lb)   SpO2 99%   BMI 37.59 kg/m²   Physical Exam   Constitutional: She is oriented to person, place, and time. She appears well-developed and well-nourished. No distress.   HENT:   Head: Normocephalic and atraumatic.   Eyes: No " scleral icterus.   Neck: Normal range of motion.   Cardiovascular: Normal rate, regular rhythm and normal heart sounds. Exam reveals no gallop and no friction rub.   No murmur heard.  Pulmonary/Chest: Effort normal and breath sounds normal. No respiratory distress. She has no wheezes. She has no rales.   Abdominal: Soft. Bowel sounds are normal. She exhibits no distension. There is no tenderness.   Musculoskeletal: She exhibits no edema.   Neurological: She is alert and oriented to person, place, and time. No cranial nerve deficit.   Skin: Skin is warm and dry. She is not diaphoretic. No erythema.   Psychiatric: She has a normal mood and affect. Her behavior is normal.       Results Review:    ECG 12 Lead  Date/Time: 8/14/2020 10:05 AM  Performed by: Ronny Lowe MD  Authorized by: Ronny Lowe MD   Comparison: compared with previous ECG   Similar to previous ECG  Rhythm: sinus rhythm and paced  Rate: normal  Conduction: non-specific intraventricular conduction delay  ST Segments: ST segments normal  T Waves: T waves normal  QRS axis: normal  Other findings: left ventricular hypertrophy    Clinical impression: non-specific ECG  Comments: Atrial paced rhythm            Admission on 02/26/2020, Discharged on 02/26/2020   Component Date Value Ref Range Status   • WBC 02/26/2020 5.64  3.40 - 10.80 10*3/mm3 Final   • RBC 02/26/2020 4.36  3.77 - 5.28 10*6/mm3 Final   • Hemoglobin 02/26/2020 12.4  12.0 - 15.9 g/dL Final   • Hematocrit 02/26/2020 38.0  34.0 - 46.6 % Final   • MCV 02/26/2020 87.2  79.0 - 97.0 fL Final   • MCH 02/26/2020 28.4  26.6 - 33.0 pg Final   • MCHC 02/26/2020 32.6  31.5 - 35.7 g/dL Final   • RDW 02/26/2020 16.2* 12.3 - 15.4 % Final   • RDW-SD 02/26/2020 51.6  37.0 - 54.0 fl Final   • Platelets 02/26/2020 185  140 - 450 10*3/mm3 Final   • Protime 02/26/2020 13.4  11.9 - 14.6 Seconds Final   • INR 02/26/2020 1.03  0.91 - 1.09 Final       Assessment/Plan   Veronica was seen today for follow-up, pacemaker  check, hypertension and hyperlipidemia.    Diagnoses and all orders for this visit:    Essential hypertension, much better control    Pacemaker, needs to be interrogated    Mixed hyperlipidemia, diet controlled    Peripheral edema, minimal    Other orders  -     ECG 12 Lead

## 2020-08-21 ENCOUNTER — TELEPHONE (OUTPATIENT)
Dept: INFUSION THERAPY | Age: 70
End: 2020-08-21

## 2020-08-21 NOTE — TELEPHONE ENCOUNTER
Pt called c/o iron pills causing green loose stools. Discussed with Malou who recommended for pt to hold iron pills x5 days then restart iron pills twice a day instead of three times a day and take Imodium p.r.n. Call to pt with above orders. Pt voiced understanding.

## 2020-09-02 ENCOUNTER — PATIENT MESSAGE (OUTPATIENT)
Dept: INTERNAL MEDICINE | Age: 70
End: 2020-09-02

## 2020-09-02 NOTE — TELEPHONE ENCOUNTER
From: Dea Barksdale  To: Cassia Henriquez MD  Sent: 9/2/2020 1:38 PM CDT  Subject: Non-Urgent Medical Question    Since I can't take the Lyrica, what do I take for my neuropathy? My PT is 2.3. Also my stomach is messed up, I have a slight cough and my throat and ears hurt. My stomach feels like it's on fire when I eat or drink, then it's to the bathroom.

## 2020-09-03 ENCOUNTER — TELEPHONE (OUTPATIENT)
Dept: INTERNAL MEDICINE | Age: 70
End: 2020-09-03

## 2020-09-03 RX ORDER — GABAPENTIN 100 MG/1
100 CAPSULE ORAL 3 TIMES DAILY
Qty: 270 CAPSULE | Refills: 0 | Status: SHIPPED | OUTPATIENT
Start: 2020-09-03 | End: 2020-12-14

## 2020-09-03 NOTE — TELEPHONE ENCOUNTER
Note      From: Eric Alvarez  To: Aimee Toro MD  Sent: 8/21/2020  9:23 AM CDT  Subject: Prescription Question    Have been taking my medication for 2 weeks straight and the Lyrica has put sores in my mouth, balance very off, mouth dry, sleeping a great deal         She was told to stop the Lyrica 8/21 due to the above symptoms she said was caused by Lyrica.

## 2020-09-03 NOTE — TELEPHONE ENCOUNTER
Shanika with [facility] left message stating they would be faxing over paperwork needing additional info genetic testing. Paperwork has been received.

## 2020-09-04 NOTE — TELEPHONE ENCOUNTER
Dr. Lucy Ellison suggested that pt go to the genetics office through [facility]. I spoke with pt about this and she said she would go there. I gave her the phone number and she said she would call them.

## 2020-09-04 NOTE — TELEPHONE ENCOUNTER
I spoke with the pt to see why she is wanting genetic testing. She said it is because her mother had cervical cancer and her brother has colon cancer. I do not see any documentation where this has been discussed at any appointments. I think the note will need to be ammended. I have tried calling [facility] twice but keep get a recording. The paperwork says they need the following documentation:  1. Your treatment of the patient for the specific medical problem the test is being ordered to treat and/or manage (signs, symptoms, duration)  2. Reason you are ordering the test.  3. Support for the use of selected ICD 10 and CPT codes. 4. Narrative description of how the genetic test results will be ultilized in the management of the pt's ongoing care and treatment. 5. An other records or info you believe supports the medical necessity of the ordered testing.

## 2020-09-10 ENCOUNTER — NURSE ONLY (OUTPATIENT)
Dept: INTERNAL MEDICINE | Age: 70
End: 2020-09-10
Payer: MEDICARE

## 2020-09-10 ENCOUNTER — OFFICE VISIT (OUTPATIENT)
Age: 70
End: 2020-09-10

## 2020-09-10 VITALS — HEART RATE: 62 BPM | OXYGEN SATURATION: 98 % | TEMPERATURE: 98 F

## 2020-09-10 PROCEDURE — 96372 THER/PROPH/DIAG INJ SC/IM: CPT | Performed by: INTERNAL MEDICINE

## 2020-09-10 RX ORDER — CYANOCOBALAMIN 1000 UG/ML
1000 INJECTION INTRAMUSCULAR; SUBCUTANEOUS ONCE
Status: COMPLETED | OUTPATIENT
Start: 2020-09-10 | End: 2020-09-10

## 2020-09-10 RX ADMIN — CYANOCOBALAMIN 1000 MCG: 1000 INJECTION INTRAMUSCULAR; SUBCUTANEOUS at 09:47

## 2020-09-10 NOTE — PATIENT INSTRUCTIONS
1000mcg of B12 given in (R) deltoid per Dr. Johan Burroughs' orders. Pt tolerated well.  [facility] #[id number] LOT # [id number] 2/2022

## 2020-09-12 LAB — SARS-COV-2, NAA: NOT DETECTED

## 2020-09-17 ENCOUNTER — OFFICE VISIT (OUTPATIENT)
Dept: INTERNAL MEDICINE | Age: 70
End: 2020-09-17
Payer: MEDICARE

## 2020-09-17 ENCOUNTER — HOSPITAL ENCOUNTER (OUTPATIENT)
Dept: GENERAL RADIOLOGY | Age: 70
Discharge: HOME | End: 2020-09-17
Payer: MEDICARE

## 2020-09-17 VITALS — OXYGEN SATURATION: 96 % | SYSTOLIC BLOOD PRESSURE: 162 MMHG | HEART RATE: 71 BPM | DIASTOLIC BLOOD PRESSURE: 96 MMHG

## 2020-09-17 DIAGNOSIS — R63.0 LOSS OF APPETITE: ICD-10-CM

## 2020-09-17 DIAGNOSIS — R30.0 DYSURIA: ICD-10-CM

## 2020-09-17 LAB
ALBUMIN SERPL-MCNC: 3.7 G/DL (ref 3.5–5.2)
ALP BLD-CCNC: 71 U/L (ref 35–104)
ALT SERPL-CCNC: 15 U/L (ref 5–33)
ANION GAP SERPL CALCULATED.3IONS-SCNC: 14 MMOL/L (ref 7–19)
AST SERPL-CCNC: 33 U/L (ref 5–32)
BASOPHILS ABSOLUTE: 0 K/UL (ref 0–0.2)
BASOPHILS RELATIVE PERCENT: 0.4 % (ref 0–1)
BILIRUB SERPL-MCNC: 0.5 MG/DL (ref 0.2–1.2)
BUN BLDV-MCNC: 29 MG/DL (ref 8–23)
CALCIUM SERPL-MCNC: 9 MG/DL (ref 8.8–10.2)
CHLORIDE BLD-SCNC: 102 MMOL/L (ref 98–111)
CO2: 22 MMOL/L (ref 22–29)
CREAT SERPL-MCNC: 1.5 MG/DL (ref 0.5–0.9)
EOSINOPHILS ABSOLUTE: 0 K/UL (ref 0–0.6)
EOSINOPHILS RELATIVE PERCENT: 0 % (ref 0–5)
GFR AFRICAN AMERICAN: 41
GFR NON-AFRICAN AMERICAN: 34
GLUCOSE BLD-MCNC: 104 MG/DL (ref 74–109)
HCT VFR BLD CALC: 38.1 % (ref 37–47)
HEMOGLOBIN: 12.1 G/DL (ref 12–16)
IMMATURE GRANULOCYTES #: 0 K/UL
LYMPHOCYTES ABSOLUTE: 1.9 K/UL (ref 1.1–4.5)
LYMPHOCYTES RELATIVE PERCENT: 36.5 % (ref 20–40)
MCH RBC QN AUTO: 29.2 PG (ref 27–31)
MCHC RBC AUTO-ENTMCNC: 31.8 G/DL (ref 33–37)
MCV RBC AUTO: 92 FL (ref 81–99)
MONOCYTES ABSOLUTE: 0.8 K/UL (ref 0–0.9)
MONOCYTES RELATIVE PERCENT: 15.2 % (ref 0–10)
NEUTROPHILS ABSOLUTE: 2.4 K/UL (ref 1.5–7.5)
NEUTROPHILS RELATIVE PERCENT: 47.1 % (ref 50–65)
PDW BLD-RTO: 15.8 % (ref 11.5–14.5)
PLATELET # BLD: 136 K/UL (ref 130–400)
PMV BLD AUTO: 13 FL (ref 9.4–12.3)
POTASSIUM SERPL-SCNC: 3.7 MMOL/L (ref 3.5–5)
RBC # BLD: 4.14 M/UL (ref 4.2–5.4)
SODIUM BLD-SCNC: 138 MMOL/L (ref 136–145)
TOTAL PROTEIN: 7.9 G/DL (ref 6.6–8.7)
WBC # BLD: 5.1 K/UL (ref 4.8–10.8)

## 2020-09-17 PROCEDURE — 1123F ACP DISCUSS/DSCN MKR DOCD: CPT | Performed by: NURSE PRACTITIONER

## 2020-09-17 PROCEDURE — 1090F PRES/ABSN URINE INCON ASSESS: CPT | Performed by: NURSE PRACTITIONER

## 2020-09-17 PROCEDURE — 4040F PNEUMOC VAC/ADMIN/RCVD: CPT | Performed by: NURSE PRACTITIONER

## 2020-09-17 PROCEDURE — 1036F TOBACCO NON-USER: CPT | Performed by: NURSE PRACTITIONER

## 2020-09-17 PROCEDURE — G8427 DOCREV CUR MEDS BY ELIG CLIN: HCPCS | Performed by: NURSE PRACTITIONER

## 2020-09-17 PROCEDURE — G8417 CALC BMI ABV UP PARAM F/U: HCPCS | Performed by: NURSE PRACTITIONER

## 2020-09-17 PROCEDURE — 71046 X-RAY EXAM CHEST 2 VIEWS: CPT

## 2020-09-17 PROCEDURE — 99214 OFFICE O/P EST MOD 30 MIN: CPT | Performed by: NURSE PRACTITIONER

## 2020-09-17 PROCEDURE — 72100 X-RAY EXAM L-S SPINE 2/3 VWS: CPT

## 2020-09-17 PROCEDURE — 73562 X-RAY EXAM OF KNEE 3: CPT

## 2020-09-17 PROCEDURE — 3017F COLORECTAL CA SCREEN DOC REV: CPT | Performed by: NURSE PRACTITIONER

## 2020-09-17 PROCEDURE — G8399 PT W/DXA RESULTS DOCUMENT: HCPCS | Performed by: NURSE PRACTITIONER

## 2020-09-17 ASSESSMENT — ENCOUNTER SYMPTOMS
EYE ITCHING: 0
DIARRHEA: 0
VOMITING: 0
CONSTIPATION: 0
COLOR CHANGE: 0
WHEEZING: 0
COUGH: 0
CHOKING: 0
SORE THROAT: 0
ABDOMINAL PAIN: 0
NAUSEA: 0
EYE DISCHARGE: 0
STRIDOR: 0
TROUBLE SWALLOWING: 0
BACK PAIN: 1
ABDOMINAL DISTENTION: 0
BLOOD IN STOOL: 0
SHORTNESS OF BREATH: 0

## 2020-09-17 NOTE — PATIENT INSTRUCTIONS
1.  Fall precautions given  2. Back pain knee pain dysuria and chest pain we will x-ray all and get labs.     I did advise Jenna Moura that this would be not a complete work-up from a fall that she had Sunday it would be advisable for her to go to the ER to get all of this done in a timely manner and then she could also likely get a CAT scan today as well she declines to go to the ER and wants to proceed with testing here in the office

## 2020-09-17 NOTE — PROGRESS NOTES
(gastroesophageal reflux disease)     History of gastric bypass     Hx of lupus anticoagulant disorder     Hypertension     Hypothyroidism     Intermittent claudication (HCC)     Intestinal obstruction (HCC)     Iron deficiency     Left-sided weakness     Low vitamin D level     Lupus (HCC)     Menopause     Obesity     Osteoarthritis     Osteoporosis     Other iron deficiency anemias     Pernicious anemia     PUPP (pruritic urticarial papules and plaques of pregnancy)     Right leg numbness     Right sided sciatica     Sarcoidosis     with liver involvement    Sciatica     Secondary hyperparathyroidism ([address]     Shingles     Shortness of breath     Sleep apnea     Stomach ulcer     Syncope     Type II diabetes mellitus with nephropathy (HCC)     Visual loss, one eye       Past Surgical History:   Procedure Laterality Date    APPENDECTOMY      CARDIAC CATHETERIZATION  10/21/13  [facility]    EF over 60%    CHOLECYSTECTOMY      COLONOSCOPY  2/2010    negative    COLONOSCOPY  2/22/10    Dr Tequila Sanchez    COLONOSCOPY  4/1/16    Dr LAKHWINDER Horvath-internal hemorrhoids, 5 yr recall    EYE SURGERY      Cyst on Right eye    EYE SURGERY      GASTRIC BYPASS SURGERY      GASTRIC BYPASS SURGERY      HERNIA REPAIR      HYSTERECTOMY      Complete    HYSTERECTOMY      Partial - because had a tubal pregnancy.      INCONTINENCE SURGERY      Bladder Sling    OTHER SURGICAL HISTORY      IVC filter    PACEMAKER INSERTION      PACEMAKER PLACEMENT      med[employer]    NC TOTAL KNEE ARTHROPLASTY Right 3/26/2018    TOTAL KNEE REPLACEMENT COMPLEX PRIMARY performed by Helga Mena MD at [address] [place] [address],[address]      SMALL INTESTINE SURGERY      SPLENECTOMY      KRISTEL AND BSO      TONSILLECTOMY AND ADENOIDECTOMY      UPPER GASTROINTESTINAL ENDOSCOPY  12/2011    gerd s/p gastric bypass    UPPER GASTROINTESTINAL ENDOSCOPY  2/2014    normal s/p gastric bypass    UPPER GASTROINTESTINAL ENDOSCOPY 2/2010    biopsy neg Barretts, chronic reflux esophagitis s/p gastric bypass    UPPER GASTROINTESTINAL ENDOSCOPY  7/2006    unremarkable s/p gastric bypass    UPPER GASTROINTESTINAL ENDOSCOPY  8/10/15    Dr Cole Escamilla ENDOSCOPY  4/1/16    Dr LAKHWINDER Winn N/A 4/1/2016    EGD ESOPHAGOGASTRODUODENOSCOPY performed [facility] Gurmeet [facility]ria [facility] [address] [facility]    [facility]       Vitals 9/17/2020 9/17/2020 9/10/2020 7/23/2020 7/23/2020 7/45/8041   SYSTOLIC 557 607 [phone]   DIASTOLIC 96 98 - 94 006 96   Site - - - - Right Upper Arm Left Upper Arm   Position - - - - - -   Cuff Size - - - - - -   Pulse - 71 62 - - 68   Temp - - 98 - - -   Resp - - - - - -   SpO2 - 96 98 - - 99   Weight - - - - - 234 lb   Height - - - - - 5' 7\"   BMI (wt*703/ht~2) - - - - - 36.65 kg/m2   Pain Level - - - - - -   Some recent data might be hidden       Family History   Problem Relation Age of Onset    Uterine Cancer Mother     Cervical Cancer Mother     Coronary Art Dis Mother     Heart Disease Father     Lung Cancer Father     Other Father         renal failure    Colon Cancer Brother     Colon Polyps Brother     Uterine Cancer Maternal Grandmother     Cervical Cancer Maternal Grandmother     Diabetes Maternal Grandmother     Cervical Cancer Sister     Other Sister         fibromyalgia    Diabetes Paternal Aunt     Esophageal Cancer Neg Hx     Liver Cancer Neg Hx     Liver Disease Neg Hx     Stomach Cancer Neg Hx     Rectal Cancer Neg Hx        Social History     Tobacco Use    Smoking status: Never Smoker    Smokeless tobacco: Never Used   Substance Use Topics    Alcohol use: No      Current Outpatient Medications   Medication Sig Dispense Refill    gabapentin (NEURONTIN) 100 MG capsule Take 1 capsule by mouth 3 times daily for 90 days.  270 capsule 0    baclofen (LIORESAL) 10 MG tablet Take 1 tablet by mouth 3 times daily As needed Results   Component Value Date     07/14/2020    K 4.2 07/14/2020     07/14/2020    CO2 21 (L) 07/14/2020    BUN 28 (H) 07/14/2020    CREATININE 1.1 (H) 07/14/2020    GLUCOSE 80 07/14/2020    CALCIUM 9.2 07/14/2020    PROT 7.4 07/14/2020    LABALBU 4.1 07/14/2020    BILITOT 0.4 07/14/2020    ALKPHOS 78 07/14/2020    AST 18 07/14/2020    ALT 8 07/14/2020    LABGLOM 49 (A) 07/14/2020    GFRAA >59 07/14/2020    GLOB 3.9 06/18/2020     Lab Results   Component Value Date    CHOL 210 (H) 07/14/2020    CHOL 208 (H) 03/05/2020    CHOL 194 09/10/2019     Lab Results   Component Value Date    TRIG 101 07/14/2020    TRIG 60 03/05/2020    TRIG 54 09/10/2019     Lab Results   Component Value Date    HDL 81 07/14/2020    HDL 88 03/05/2020    HDL 95 09/10/2019     Lab Results   Component Value Date    LDLCALC 109 07/14/2020    LDLCALC 108 03/05/2020    LDLCALC 88 09/10/2019     Lab Results   Component Value Date     07/14/2020    K 4.2 07/14/2020    K 4.4 01/26/2020     07/14/2020    CO2 21 07/14/2020    BUN 28 07/14/2020    CREATININE 1.1 07/14/2020    GLUCOSE 80 07/14/2020    CALCIUM 9.2 07/14/2020      Lab Results   Component Value Date    WBC 4.8 07/14/2020    HGB 11.7 (L) 07/14/2020    HCT 37.4 07/14/2020    MCV 93.3 07/14/2020     07/14/2020    LABLYMP 2.30 02/27/2015    LYMPHOPCT 32.9 07/14/2020    RBC 4.01 (L) 07/14/2020    MCH 29.2 07/14/2020    MCHC 31.3 (L) 07/14/2020    RDW 16.1 (H) 07/14/2020     Lab Results   Component Value Date    VITD25 15.8 (L) 07/14/2020       Subjective:      Review of Systems   Constitutional: Negative for fatigue, fever and unexpected weight change. HENT: Negative for ear discharge, ear pain, mouth sores, sore throat and trouble swallowing. Eyes: Negative for discharge, itching and visual disturbance. Respiratory: Negative for cough, choking, shortness of breath, wheezing and stridor. Cardiovascular: Positive for chest pain.  Negative for palpitations and leg swelling. Gastrointestinal: Negative for abdominal distention, abdominal pain, blood in stool, constipation, diarrhea, nausea and vomiting. Endocrine: Negative for cold intolerance, polydipsia and polyuria. Genitourinary: Positive for urgency. Negative for difficulty urinating, dysuria and frequency. Musculoskeletal: Positive for arthralgias and back pain. Negative for gait problem. Skin: Negative for color change and rash. Allergic/Immunologic: Negative for food allergies and immunocompromised state. Neurological: Positive for headaches. Negative for dizziness, tremors, syncope, speech difficulty and weakness. Hematological: Negative for adenopathy. Does not bruise/bleed easily. Psychiatric/Behavioral: Negative for confusion and hallucinations. Objective:     Physical Exam  Constitutional:       General: She is not in acute distress. Appearance: She is well-developed. HENT:      Head: Normocephalic and atraumatic. Eyes:      General: No scleral icterus. Right eye: No discharge. Left eye: No discharge. Pupils: Pupils are equal, round, and reactive to light. Neck:      Musculoskeletal: Normal range of motion and neck supple. Thyroid: No thyromegaly. Vascular: No JVD. Cardiovascular:      Rate and Rhythm: Normal rate and regular rhythm. Heart sounds: Normal heart sounds. No murmur. Comments: The chest pain is elicited by just pressure of palpation of her chest noncardiac in nature and is been since her fall  Pulmonary:      Effort: Pulmonary effort is normal. No respiratory distress. Breath sounds: Normal breath sounds. No wheezing or rales. Abdominal:      General: Bowel sounds are normal. There is no distension. Palpations: Abdomen is soft. There is no mass. Tenderness: There is no abdominal tenderness. There is no guarding or rebound. Musculoskeletal: Normal range of motion. General: No tenderness.  Urinalysis Reflex to Culture       Follow-up:  No follow-ups on file. PATIENT INSTRUCTIONS:  Patient Instructions   1. Fall precautions given  2. Back pain knee pain dysuria and chest pain we will x-ray all and get labs. I did advise Jaky Brian that this would be not a complete work-up from a fall that she had Sunday it would be advisable for her to go to the ER to get all of this done in a timely manner and then she could also likely get a CAT scan today as well she declines to go to the ER and wants to proceed with testing here in the office    Electronically signed by CHANELLE Espinoza on 9/17/2020 at 12:36 PM    EMRDragon/transcription disclaimer:  Much of this encounter note is electronic transcription/translation of spoken language to printed texts. The electronic translation of spoken language may be erroneous, or at times,nonsensical words or phrases may be inadvertently transcribed.   Although I have reviewed the note for such errors, some may still exist.

## 2020-09-21 DIAGNOSIS — E86.0 DEHYDRATION: ICD-10-CM

## 2020-09-21 LAB
ALBUMIN SERPL-MCNC: 3.7 G/DL (ref 3.5–5.2)
ALP BLD-CCNC: 68 U/L (ref 35–104)
ALT SERPL-CCNC: 17 U/L (ref 5–33)
ANION GAP SERPL CALCULATED.3IONS-SCNC: 17 MMOL/L (ref 7–19)
AST SERPL-CCNC: 45 U/L (ref 5–32)
BILIRUB SERPL-MCNC: 0.9 MG/DL (ref 0.2–1.2)
BUN BLDV-MCNC: 18 MG/DL (ref 8–23)
CALCIUM SERPL-MCNC: 9 MG/DL (ref 8.8–10.2)
CHLORIDE BLD-SCNC: 101 MMOL/L (ref 98–111)
CO2: 20 MMOL/L (ref 22–29)
CREAT SERPL-MCNC: 1.2 MG/DL (ref 0.5–0.9)
GFR AFRICAN AMERICAN: 54
GFR NON-AFRICAN AMERICAN: 44
GLUCOSE BLD-MCNC: 119 MG/DL (ref 74–109)
POTASSIUM SERPL-SCNC: 3.9 MMOL/L (ref 3.5–5)
SODIUM BLD-SCNC: 138 MMOL/L (ref 136–145)
TOTAL PROTEIN: 8 G/DL (ref 6.6–8.7)

## 2020-09-22 ENCOUNTER — TELEPHONE (OUTPATIENT)
Dept: INTERNAL MEDICINE | Age: 70
End: 2020-09-22

## 2020-09-22 ENCOUNTER — OFFICE VISIT (OUTPATIENT)
Dept: INTERNAL MEDICINE | Age: 70
End: 2020-09-22
Payer: MEDICARE

## 2020-09-22 VITALS — DIASTOLIC BLOOD PRESSURE: 93 MMHG | HEART RATE: 76 BPM | SYSTOLIC BLOOD PRESSURE: 160 MMHG

## 2020-09-22 PROCEDURE — G8427 DOCREV CUR MEDS BY ELIG CLIN: HCPCS | Performed by: NURSE PRACTITIONER

## 2020-09-22 PROCEDURE — 99214 OFFICE O/P EST MOD 30 MIN: CPT | Performed by: NURSE PRACTITIONER

## 2020-09-22 PROCEDURE — 1123F ACP DISCUSS/DSCN MKR DOCD: CPT | Performed by: NURSE PRACTITIONER

## 2020-09-22 PROCEDURE — 3017F COLORECTAL CA SCREEN DOC REV: CPT | Performed by: NURSE PRACTITIONER

## 2020-09-22 PROCEDURE — 4040F PNEUMOC VAC/ADMIN/RCVD: CPT | Performed by: NURSE PRACTITIONER

## 2020-09-22 PROCEDURE — G8417 CALC BMI ABV UP PARAM F/U: HCPCS | Performed by: NURSE PRACTITIONER

## 2020-09-22 PROCEDURE — G8399 PT W/DXA RESULTS DOCUMENT: HCPCS | Performed by: NURSE PRACTITIONER

## 2020-09-22 PROCEDURE — 1036F TOBACCO NON-USER: CPT | Performed by: NURSE PRACTITIONER

## 2020-09-22 PROCEDURE — 1090F PRES/ABSN URINE INCON ASSESS: CPT | Performed by: NURSE PRACTITIONER

## 2020-09-22 ASSESSMENT — ENCOUNTER SYMPTOMS
CONSTIPATION: 0
TROUBLE SWALLOWING: 0
ABDOMINAL PAIN: 0
NAUSEA: 0
COUGH: 0
CHOKING: 0
EYE DISCHARGE: 0
VOMITING: 0
STRIDOR: 0
SHORTNESS OF BREATH: 0
COLOR CHANGE: 0
WHEEZING: 0
SORE THROAT: 0
ABDOMINAL DISTENTION: 0
DIARRHEA: 0
BLOOD IN STOOL: 0
EYE ITCHING: 0

## 2020-09-22 NOTE — PATIENT INSTRUCTIONS
[facility] was sent to the ER for further evaluation, labs can certainly use her CK check due to her fall.

## 2020-09-22 NOTE — PROGRESS NOTES
[facility] INTERNAL MEDICINE  [address] [address]  [address] [address]  Dept: [phone]  Dept Fax: [phone]  Loc: [phone]    Roxanna Lesch is a 69 y.o. female who presents today for her medical conditions/complaints as noted below. Roxanna Lesch is c/joshua Extremity Weakness (Patient is here for follow up visit. Patient had CMP done yesterday. Patient states nausea and no appetite. Patient states she has not been able to take medications.)        HPI:     HPI   1. Nausea; Cant eat or take her meds;   2  Joint pain all over; No fever or chills; She tried to go to work yesterday and she had to be sent home in a w/c;    3. Fatigue  She has just been sitting on her front porch; She lives with her daughter and granddaughter and niece and son in law; They have been trying to get eat and she says she just cant . Talk to her daughter on the phone she said they have been trying everything to get her to eat. To get up and move around she just will not do it. I explained to the daughter on the phone that she just needs to go to the ER and let them evaluate her probably be able to get results in a timely manner the piecemeal that we will have to do in the office is just not working. She is not really any better than when I saw her last week given the fact that she had fallen on the floor she is had some kidney involvement think she just needs to go over there so that they can do all of her labs and assess check her CKs and monitor renal function she may need IV fluids for a bit but she has so many complaints and is so hard to evaluate her in the office setting. Chief Complaint   Patient presents with    Extremity Weakness     Patient is here for follow up visit. Patient had CMP done yesterday. Patient states nausea and no appetite. Patient states she has not been able to take medications.        Past Medical History:   Diagnosis Date    Abdominal pain     Abnormal EKG  Acute sinusitis     Allergic reaction to spider bite     Anemia     Anticoagulated     Anxiety     Arrhythmia     Asthmatic bronchitis without complication 6/68/2060    Ataxic gait     Lyons's esophagus     Bowel obstruction (HCC)     Callus     Cardiac pacemaker     CKD (chronic kidney disease) stage 2, GFR 60-89 ml/min     Coat's syndrome     Coat's syndrome     both eyes    Depression     Diabetes mellitus type 2 in nonobese (HCC)     Diabetic nephropathy (HCC)     Disequilibrium     Dizziness     DVT (deep venous thrombosis) (HCC)     Exudative retinopathy     Fibromyalgia     Fibromyositis     Gastric ulcer     GERD (gastroesophageal reflux disease)     History of gastric bypass     Hx of lupus anticoagulant disorder     Hypertension     Hypothyroidism     Intermittent claudication (HCC)     Intestinal obstruction (HCC)     Iron deficiency     Left-sided weakness     Low vitamin D level     Lupus (HCC)     Menopause     Obesity     Osteoarthritis     Osteoporosis     Other iron deficiency anemias     Pernicious anemia     PUPP (pruritic urticarial papules and plaques of pregnancy)     Right leg numbness     Right sided sciatica     Sarcoidosis     with liver involvement    Sciatica     Secondary hyperparathyroidism ([address]     Shingles     Shortness of breath     Sleep apnea     Stomach ulcer     Syncope     Type II diabetes mellitus with nephropathy (HCC)     Visual loss, one eye       Past Surgical History:   Procedure Laterality Date    APPENDECTOMY      CARDIAC CATHETERIZATION  10/21/13  [address]    EF over 60%    CHOLECYSTECTOMY      COLONOSCOPY  2/2010    negative    COLONOSCOPY  2/22/10    Dr Sandy Allen    COLONOSCOPY  4/1/16    Dr LAKHWINDER Horvath-internal hemorrhoids, 5 yr recall    EYE SURGERY      Cyst on Right eye    EYE SURGERY      GASTRIC BYPASS SURGERY      GASTRIC BYPASS SURGERY      HERNIA REPAIR      HYSTERECTOMY      Complete    HYSTERECTOMY      Partial - because had a tubal pregnancy.      INCONTINENCE SURGERY      Bladder Sling    OTHER SURGICAL HISTORY      IVC filter    PACEMAKER INSERTION      PACEMAKER PLACEMENT      medtronic    ID TOTAL KNEE ARTHROPLASTY Right 3/26/2018    TOTAL KNEE REPLACEMENT COMPLEX PRIMARY performed by Meeta Mercer MD at [facility]      SPLENECTOMY      KRISTEL AND BSO      TONSILLECTOMY AND ADENOIDECTOMY      UPPER GASTROINTESTINAL ENDOSCOPY  12/2011    gerd s/p gastric bypass    UPPER GASTROINTESTINAL ENDOSCOPY  2/2014    normal s/p gastric bypass    UPPER GASTROINTESTINAL ENDOSCOPY  2/2010    biopsy neg Barretts, chronic reflux esophagitis s/p gastric bypass    UPPER GASTROINTESTINAL ENDOSCOPY  7/2006    unremarkable s/p gastric bypass    UPPER GASTROINTESTINAL ENDOSCOPY  8/10/15    Dr Sin Ponce ENDOSCOPY  4/1/16    Dr Cassandra Baldwin N/A 4/1/2016    EGD ESOPHAGOGASTRODUODENOSCOPY performed by Tania Rachel MD at [address] [facility]    [facility] 2003       Vitals 9/22/2020 9/22/2020 9/17/2020 9/17/2020 9/10/2020 1/69/8541   SYSTOLIC [phone] 210 - 310   DIASTOLIC 93 93 96 98 - 94   Site - - - - - -   Position - - - - - -   Cuff Size - - - - - -   Pulse - 76 - 71 62 -   Temp - - - - 98 -   Resp - - - - - -   SpO2 - - - 96 98 -   Weight - - - - - -   Height - - - - - -   BMI (wt*703/ht~2) - - - - - -   Pain Level - - - - - -   Some recent data might be hidden       Family History   Problem Relation Age of Onset    Uterine Cancer Mother     Cervical Cancer Mother     Coronary Art Dis Mother     Heart Disease Father     Lung Cancer Father     Other Father         renal failure    Colon Cancer Brother     Colon Polyps Brother     Uterine Cancer Maternal Grandmother     Cervical Cancer Maternal Grandmother     Diabetes Maternal Grandmother     Cervical Cancer Sister     Other Sister         fibromyalgia    Diabetes Paternal Aunt     Esophageal Cancer Neg Hx     Liver Cancer Neg Hx     Liver Disease Neg Hx     Stomach Cancer Neg Hx     Rectal Cancer Neg Hx        Social History     Tobacco Use    Smoking status: Never Smoker    Smokeless tobacco: Never Used   Substance Use Topics    Alcohol use: No      Current Outpatient Medications   Medication Sig Dispense Refill    gabapentin (NEURONTIN) 100 MG capsule Take 1 capsule by mouth 3 times daily for 90 days. 270 capsule 0    baclofen (LIORESAL) 10 MG tablet Take 1 tablet by mouth 3 times daily As needed for hip pain 10 tablet 1    vitamin D (ERGOCALCIFEROL) 1.25 MG (21513 UT) CAPS capsule Take 1 capsule by mouth Twice a Week 12 capsule 1    calcipotriene (DOVONEX) 0.005 % cream Use topically as needed 3 Tube 3    clobetasol (TEMOVATE) 0.05 % cream Apply topically 2 times daily. 3 Tube 3    cyanocobalamin 1000 MCG/ML injection Inject 1 mL into the muscle once for 1 dose 1 mL 11    fexofenadine (ALLEGRA) 180 MG tablet Take 1 tablet by mouth daily Indications:  Allergic Rhinitis 90 tablet 3    pantoprazole (PROTONIX) 40 MG tablet Take 1 tablet by mouth 2 times daily (before meals) 60 tablet 3    levothyroxine (SYNTHROID) 100 MCG tablet Take 1 tablet by mouth Daily 90 tablet 3    diclofenac sodium 1 % GEL Apply 4 g topically 4 times daily 4 Tube 1    ondansetron (ZOFRAN) 4 MG tablet Take 1 tablet by mouth daily as needed for Nausea or Vomiting 30 tablet 0    warfarin (COUMADIN) 7.5 MG tablet 2 tablets daily, DO NOT RESUME UNTIL YOU RECHECK YOUR INR ON THURSDAY AND DISCUSS WITH  tablet 3    cloNIDine (CATAPRES) 0.1 MG tablet Take 1 tablet by mouth 2 times daily As needed for BP greater than 170/110 60 tablet 3    Multiple Vitamins-Minerals (CENTRUM ADULTS) TABS Take 1 tablet by mouth daily      escitalopram (LEXAPRO) 20 MG tablet Take 1 tablet by mouth daily 90 tablet 2    lisinopril (PRINIVIL;ZESTRIL) 40 MG tablet Take 1 tablet by mouth daily 90 tablet 0    bumetanide (BUMEX) 2 MG tablet Take 1 tablet by mouth daily 30 tablet 5    tiotropium (SPIRIVA HANDIHALER) 18 MCG inhalation capsule Inhale 1 capsule into the lungs daily 90 capsule 1    potassium chloride (KLOR-CON M) 10 MEQ extended release tablet Take 1 tablet by mouth daily 90 tablet 3    ferrous gluconate (FERGON) 240 (27 Fe) MG tablet Take 1 tablet by mouth 3 times daily (with meals) 270 tablet 2    aspirin 81 MG tablet Take 81 mg by mouth daily      pregabalin (LYRICA) 75 MG capsule Take 1 capsule by mouth 3 times daily for 90 days.  90 capsule 2     Current Facility-Administered Medications   Medication Dose Route Frequency Provider Last Rate Last Dose    cyanocobalamin injection 1,000 mcg  1,000 mcg Intramuscular Once Vandana Miller MD         Allergies   Allergen Reactions    Insect Extract Allergy Skin Test Anaphylaxis     Bee stings    Lactose Intolerance (Gi)     Prednisone      Headache and upset stomach    Zanaflex [Tizanidine Hcl]      Passed out lost control of body functions    Lortab [Hydrocodone-Acetaminophen] Nausea And Vomiting     Sweats, weak, nausea and vomiting    Other Nausea And Vomiting     Opiates------sweating, weak        Oxycodone-Acetaminophen Nausea And Vomiting     Sweating and vomiting     Ultram [Tramadol Hcl] Nausea And Vomiting     Sweating, weak, nausea and vomiting         Health Maintenance   Topic Date Due    Meningococcal (ACWY) vaccine (1 - Risk start before 7 months 4-dose series) 1950    Hib vaccine (1 of 1 - Risk 1-dose series) 09[birth year]29/1950    Diabetic retinal exam  06/29/1959    Meningococcal B vaccine (1 of 4 - Increased Risk Bexsero 2-dose series) 06/29/1959    Shingles Vaccine (2 of 3) 12/07/2015    Flu vaccine (1) 09/01/2020    Annual Wellness Visit (AWV)  03/07/2021    Colon cancer screen colonoscopy  04/01/2021    Breast cancer screen  05/20/2021    A1C test (Diabetic or Prediabetic)  07/14/2021    Lipid screen  07/14/2021    TSH testing  07/14/2021    Diabetic foot exam  07/17/2021    Potassium monitoring  09/21/2021    Creatinine monitoring  09/21/2021    DTaP/Tdap/Td vaccine (3 - Td) 08/17/2028    Pneumococcal 65+ yrs at Risk Vaccine  Completed    Hepatitis C screen  Completed    DEXA (modify frequency per FRAX score)  Addressed    Hepatitis A vaccine  Aged Out       Lab Results   Component Value Date    LABA1C 5.5 07/14/2020     No results found for: PSA, PSADIA  TSH   Date Value Ref Range Status   07/14/2020 4.390 (H) 0.270 - 4.200 uIU/mL Final   ]  Lab Results   Component Value Date     09/21/2020    K 3.9 09/21/2020     09/21/2020    CO2 20 (L) 09/21/2020    BUN 18 09/21/2020    CREATININE 1.2 (H) 09/21/2020    GLUCOSE 119 (H) 09/21/2020    CALCIUM 9.0 09/21/2020    PROT 8.0 09/21/2020    LABALBU 3.7 09/21/2020    BILITOT 0.9 09/21/2020    ALKPHOS 68 09/21/2020    AST 45 (H) 09/21/2020    ALT 17 09/21/2020    LABGLOM 44 (A) 09/21/2020    GFRAA 54 (L) 09/21/2020    GLOB 3.9 06/18/2020     Lab Results   Component Value Date    CHOL 210 (H) 07/14/2020    CHOL 208 (H) 03/05/2020    CHOL 194 09/10/2019     Lab Results   Component Value Date    TRIG 101 07/14/2020    TRIG 60 03/05/2020    TRIG 54 09/10/2019     Lab Results   Component Value Date    HDL 81 07/14/2020    HDL 88 03/05/2020    HDL 95 09/10/2019     Lab Results   Component Value Date    LDLCALC 109 07/14/2020    LDLCALC 108 03/05/2020    LDLCALC 88 09/10/2019     Lab Results   Component Value Date     09/21/2020    K 3.9 09/21/2020    K 4.4 01/26/2020     09/21/2020    CO2 20 09/21/2020    BUN 18 09/21/2020    CREATININE 1.2 09/21/2020    GLUCOSE 119 09/21/2020    CALCIUM 9.0 09/21/2020      Lab Results   Component Value Date    WBC 5.1 09/17/2020    HGB 12.1 09/17/2020    HCT 38.1 09/17/2020    MCV 92.0 09/17/2020     09/17/2020    LABLYMP 2.30 02/27/2015 LYMPHOPCT 36.5 09/17/2020    RBC 4.14 (L) 09/17/2020    MCH 29.2 09/17/2020    MCHC 31.8 (L) 09/17/2020    RDW 15.8 (H) 09/17/2020     Lab Results   Component Value Date    VITD25 15.8 (L) 07/14/2020       Subjective:      Review of Systems   Constitutional: Positive for fatigue. Negative for fever and unexpected weight change. HENT: Negative for ear discharge, ear pain, mouth sores, sore throat and trouble swallowing. Eyes: Negative for discharge, itching and visual disturbance. Respiratory: Negative for cough, choking, shortness of breath, wheezing and stridor. Cardiovascular: Negative for chest pain, palpitations and leg swelling. Gastrointestinal: Negative for abdominal distention, abdominal pain, blood in stool, constipation, diarrhea, nausea and vomiting. Endocrine: Negative for cold intolerance, polydipsia and polyuria. Genitourinary: Negative for difficulty urinating, dysuria, frequency and urgency. Musculoskeletal: Positive for arthralgias. Negative for gait problem. Skin: Negative for color change and rash. Allergic/Immunologic: Negative for food allergies and immunocompromised state. Neurological: Positive for light-headedness. Negative for dizziness, tremors, syncope, speech difficulty, weakness and headaches. Hematological: Negative for adenopathy. Does not bruise/bleed easily. Psychiatric/Behavioral: Negative for confusion and hallucinations. Objective:     Physical Exam  Constitutional:       General: She is not in acute distress. Appearance: She is well-developed. She is ill-appearing. HENT:      Head: Normocephalic and atraumatic. Eyes:      General: No scleral icterus. Right eye: No discharge. Left eye: No discharge. Pupils: Pupils are equal, round, and reactive to light. Neck:      Musculoskeletal: Normal range of motion and neck supple. Thyroid: No thyromegaly. Vascular: No JVD.    Cardiovascular:      Rate and Rhythm: use her CK check due to her fall. Electronically signed by Pink Rinne, APRN on 9/22/2020 at 2:01 PM    EMRDragon/transcription disclaimer:  Much of this encounter note is electronic transcription/translation of spoken language to printed texts. The electronic translation of spoken language may be erroneous, or at times,nonsensical words or phrases may be inadvertently transcribed.   Although I have reviewed the note for such errors, some may still exist.

## 2020-09-23 ENCOUNTER — APPOINTMENT (OUTPATIENT)
Dept: CT IMAGING | Age: 70
DRG: 178 | End: 2020-09-23
Payer: MEDICARE

## 2020-09-23 ENCOUNTER — HOSPITAL ENCOUNTER (INPATIENT)
Age: 70
LOS: 7 days | Discharge: HOME HEALTH | DRG: 178 | End: 2020-09-30
Attending: EMERGENCY MEDICINE | Admitting: FAMILY MEDICINE
Payer: MEDICARE

## 2020-09-23 ENCOUNTER — APPOINTMENT (OUTPATIENT)
Dept: GENERAL RADIOLOGY | Age: 70
DRG: 178 | End: 2020-09-23
Payer: MEDICARE

## 2020-09-23 PROBLEM — R53.1 GENERALIZED WEAKNESS: Status: ACTIVE | Noted: 2020-09-23

## 2020-09-23 PROBLEM — U07.1 COVID-19: Status: ACTIVE | Noted: 2020-09-23
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
## 2020-09-23 LAB
ABO/RH: NORMAL
ALBUMIN SERPL-MCNC: 3.7 G/DL (ref 3.5–5.2)
ALP BLD-CCNC: 66 U/L (ref 35–104)
ALT SERPL-CCNC: 22 U/L (ref 5–33)
ANION GAP SERPL CALCULATED.3IONS-SCNC: 14 MMOL/L (ref 7–19)
ANTIBODY SCREEN: NORMAL
AST SERPL-CCNC: 56 U/L (ref 5–32)
ATYPICAL LYMPHOCYTE RELATIVE PERCENT: 3 % (ref 0–8)
BACTERIA: ABNORMAL /HPF
BASE EXCESS ARTERIAL: -3.5 MMOL/L (ref -2–2)
BASOPHILS ABSOLUTE: 0 K/UL (ref 0–0.2)
BASOPHILS RELATIVE PERCENT: 0 % (ref 0–1)
BILIRUB SERPL-MCNC: 1 MG/DL (ref 0.2–1.2)
BILIRUBIN URINE: NEGATIVE
BLOOD, URINE: NEGATIVE
BUN BLDV-MCNC: 18 MG/DL (ref 8–23)
CALCIUM SERPL-MCNC: 8.9 MG/DL (ref 8.8–10.2)
CARBOXYHEMOGLOBIN ARTERIAL: 1.4 % (ref 0–5)
CHLORIDE BLD-SCNC: 102 MMOL/L (ref 98–111)
CLARITY: CLEAR
CO2: 22 MMOL/L (ref 22–29)
COLOR: YELLOW
CREAT SERPL-MCNC: 1.2 MG/DL (ref 0.5–0.9)
CRYSTALS, UA: ABNORMAL /HPF
EOSINOPHILS ABSOLUTE: 0 K/UL (ref 0–0.6)
EOSINOPHILS RELATIVE PERCENT: 0 % (ref 0–5)
EPITHELIAL CELLS, UA: 2 /HPF (ref 0–5)
GFR AFRICAN AMERICAN: 54
GFR NON-AFRICAN AMERICAN: 44
GLUCOSE BLD-MCNC: 112 MG/DL (ref 74–109)
GLUCOSE BLD-MCNC: 118 MG/DL (ref 70–99)
GLUCOSE URINE: NEGATIVE MG/DL
HCO3 ARTERIAL: 19.2 MMOL/L (ref 22–26)
HCT VFR BLD CALC: 37.1 % (ref 37–47)
HEMOGLOBIN, ART, EXTENDED: 11.2 G/DL (ref 12–16)
HEMOGLOBIN: 12 G/DL (ref 12–16)
HYALINE CASTS: 3 /HPF (ref 0–8)
IMMATURE GRANULOCYTES #: 0.2 K/UL
INR BLD: 1.11 (ref 0.88–1.18)
KETONES, URINE: NEGATIVE MG/DL
LACTIC ACID: 1.1 MMOL/L (ref 0.5–1.9)
LEUKOCYTE ESTERASE, URINE: NEGATIVE
LYMPHOCYTES ABSOLUTE: 2.1 K/UL (ref 1.1–4.5)
LYMPHOCYTES RELATIVE PERCENT: 18 % (ref 20–40)
MCH RBC QN AUTO: 28.8 PG (ref 27–31)
MCHC RBC AUTO-ENTMCNC: 32.3 G/DL (ref 33–37)
MCV RBC AUTO: 89.2 FL (ref 81–99)
METHEMOGLOBIN ARTERIAL: 0.7 %
MONOCYTES ABSOLUTE: 0.6 K/UL (ref 0–0.9)
MONOCYTES RELATIVE PERCENT: 6 % (ref 0–10)
MYELOCYTE PERCENT: 1 %
NEUTROPHILS ABSOLUTE: 7.2 K/UL (ref 1.5–7.5)
NEUTROPHILS RELATIVE PERCENT: 72 % (ref 50–65)
NITRITE, URINE: NEGATIVE
O2 CONTENT ARTERIAL: 14.5 ML/DL
O2 SAT, ARTERIAL: 92.2 %
O2 THERAPY: ABNORMAL
PCO2 ARTERIAL: 27 MMHG (ref 35–45)
PDW BLD-RTO: 15.1 % (ref 11.5–14.5)
PERFORMED ON: ABNORMAL
PH ARTERIAL: 7.46 (ref 7.35–7.45)
PH UA: 6.5 (ref 5–8)
PLATELET # BLD: 232 K/UL (ref 130–400)
PLATELET SLIDE REVIEW: ADEQUATE
PMV BLD AUTO: 10.8 FL (ref 9.4–12.3)
PO2 ARTERIAL: 60 MMHG (ref 80–100)
POTASSIUM SERPL-SCNC: 4 MMOL/L (ref 3.5–5)
POTASSIUM, WHOLE BLOOD: 3.7
POTASSIUM, WHOLE BLOOD: 3.7
PRO-BNP: 864 PG/ML (ref 0–900)
PROTEIN UA: 30 MG/DL
PROTHROMBIN TIME: 14.3 SEC (ref 12–14.6)
RBC # BLD: 4.16 M/UL (ref 4.2–5.4)
RBC UA: 1 /HPF (ref 0–4)
SARS-COV-2, NAAT: DETECTED
SODIUM BLD-SCNC: 138 MMOL/L (ref 136–145)
SPECIFIC GRAVITY UA: 1.03 (ref 1–1.03)
TOTAL PROTEIN: 8.2 G/DL (ref 6.6–8.7)
TROPONIN: <0.01 NG/ML (ref 0–0.03)
TSH REFLEX FT4: 2.86 UIU/ML (ref 0.35–5.5)
UROBILINOGEN, URINE: 1 E.U./DL
WBC # BLD: 9.8 K/UL (ref 4.8–10.8)
WBC UA: 1 /HPF (ref 0–5)

## 2020-09-23 PROCEDURE — 2100000000 HC CCU R&B

## 2020-09-23 PROCEDURE — 36600 WITHDRAWAL OF ARTERIAL BLOOD: CPT

## 2020-09-23 PROCEDURE — 80053 COMPREHEN METABOLIC PANEL: CPT

## 2020-09-23 PROCEDURE — 6360000002 HC RX W HCPCS: Performed by: FAMILY MEDICINE

## 2020-09-23 PROCEDURE — 86901 BLOOD TYPING SEROLOGIC RH(D): CPT

## 2020-09-23 PROCEDURE — 81001 URINALYSIS AUTO W/SCOPE: CPT

## 2020-09-23 PROCEDURE — 72131 CT LUMBAR SPINE W/O DYE: CPT

## 2020-09-23 PROCEDURE — 72128 CT CHEST SPINE W/O DYE: CPT

## 2020-09-23 PROCEDURE — 84484 ASSAY OF TROPONIN QUANT: CPT

## 2020-09-23 PROCEDURE — 72125 CT NECK SPINE W/O DYE: CPT

## 2020-09-23 PROCEDURE — 85025 COMPLETE CBC W/AUTO DIFF WBC: CPT

## 2020-09-23 PROCEDURE — U0002 COVID-19 LAB TEST NON-CDC: HCPCS

## 2020-09-23 PROCEDURE — 2580000003 HC RX 258: Performed by: FAMILY MEDICINE

## 2020-09-23 PROCEDURE — 99285 EMERGENCY DEPT VISIT HI MDM: CPT

## 2020-09-23 PROCEDURE — 99999 PR OFFICE/OUTPT VISIT,PROCEDURE ONLY: CPT | Performed by: EMERGENCY MEDICINE

## 2020-09-23 PROCEDURE — 93005 ELECTROCARDIOGRAM TRACING: CPT | Performed by: EMERGENCY MEDICINE

## 2020-09-23 PROCEDURE — 83605 ASSAY OF LACTIC ACID: CPT

## 2020-09-23 PROCEDURE — 86850 RBC ANTIBODY SCREEN: CPT

## 2020-09-23 PROCEDURE — 6370000000 HC RX 637 (ALT 250 FOR IP): Performed by: FAMILY MEDICINE

## 2020-09-23 PROCEDURE — 36415 COLL VENOUS BLD VENIPUNCTURE: CPT

## 2020-09-23 PROCEDURE — 71045 X-RAY EXAM CHEST 1 VIEW: CPT

## 2020-09-23 PROCEDURE — 6360000002 HC RX W HCPCS: Performed by: EMERGENCY MEDICINE

## 2020-09-23 PROCEDURE — 86900 BLOOD TYPING SEROLOGIC ABO: CPT

## 2020-09-23 PROCEDURE — 82803 BLOOD GASES ANY COMBINATION: CPT

## 2020-09-23 PROCEDURE — 87040 BLOOD CULTURE FOR BACTERIA: CPT

## 2020-09-23 PROCEDURE — 82947 ASSAY GLUCOSE BLOOD QUANT: CPT

## 2020-09-23 PROCEDURE — 84132 ASSAY OF SERUM POTASSIUM: CPT

## 2020-09-23 PROCEDURE — 70450 CT HEAD/BRAIN W/O DYE: CPT

## 2020-09-23 PROCEDURE — 74177 CT ABD & PELVIS W/CONTRAST: CPT

## 2020-09-23 PROCEDURE — 84443 ASSAY THYROID STIM HORMONE: CPT

## 2020-09-23 PROCEDURE — 99284 EMERGENCY DEPT VISIT MOD MDM: CPT

## 2020-09-23 PROCEDURE — 85610 PROTHROMBIN TIME: CPT

## 2020-09-23 PROCEDURE — 2580000003 HC RX 258: Performed by: EMERGENCY MEDICINE

## 2020-09-23 PROCEDURE — 83880 ASSAY OF NATRIURETIC PEPTIDE: CPT

## 2020-09-23 PROCEDURE — 6360000004 HC RX CONTRAST MEDICATION: Performed by: EMERGENCY MEDICINE

## 2020-09-23 RX ORDER — M-VIT,TX,IRON,MINS/CALC/FOLIC 27MG-0.4MG
1 TABLET ORAL DAILY
Status: DISCONTINUED | OUTPATIENT
Start: 2020-09-23 | End: 2020-09-30 | Stop reason: HOSPADM

## 2020-09-23 RX ORDER — LEVOTHYROXINE SODIUM 0.1 MG/1
100 TABLET ORAL DAILY
Status: DISCONTINUED | OUTPATIENT
Start: 2020-09-24 | End: 2020-09-30 | Stop reason: HOSPADM

## 2020-09-23 RX ORDER — PANTOPRAZOLE SODIUM 40 MG/1
40 TABLET, DELAYED RELEASE ORAL
Status: DISCONTINUED | OUTPATIENT
Start: 2020-09-24 | End: 2020-09-30 | Stop reason: HOSPADM

## 2020-09-23 RX ORDER — CLONIDINE HYDROCHLORIDE 0.1 MG/1
0.1 TABLET ORAL 3 TIMES DAILY PRN
Status: DISCONTINUED | OUTPATIENT
Start: 2020-09-23 | End: 2020-09-30 | Stop reason: HOSPADM

## 2020-09-23 RX ORDER — ACETAMINOPHEN 325 MG/1
650 TABLET ORAL EVERY 6 HOURS PRN
Status: DISCONTINUED | OUTPATIENT
Start: 2020-09-23 | End: 2020-09-30 | Stop reason: HOSPADM

## 2020-09-23 RX ORDER — BUMETANIDE 1 MG/1
2 TABLET ORAL DAILY
Status: DISCONTINUED | OUTPATIENT
Start: 2020-09-23 | End: 2020-09-30 | Stop reason: HOSPADM

## 2020-09-23 RX ORDER — DEXAMETHASONE SODIUM PHOSPHATE 10 MG/ML
6 INJECTION, SOLUTION INTRAMUSCULAR; INTRAVENOUS ONCE
Status: COMPLETED | OUTPATIENT
Start: 2020-09-23 | End: 2020-09-23

## 2020-09-23 RX ORDER — ESCITALOPRAM OXALATE 10 MG/1
20 TABLET ORAL DAILY
Status: DISCONTINUED | OUTPATIENT
Start: 2020-09-23 | End: 2020-09-30 | Stop reason: HOSPADM

## 2020-09-23 RX ORDER — POTASSIUM CHLORIDE 750 MG/1
10 TABLET, EXTENDED RELEASE ORAL DAILY
Status: DISCONTINUED | OUTPATIENT
Start: 2020-09-23 | End: 2020-09-30 | Stop reason: HOSPADM

## 2020-09-23 RX ORDER — DEXTROSE MONOHYDRATE 50 MG/ML
100 INJECTION, SOLUTION INTRAVENOUS PRN
Status: DISCONTINUED | OUTPATIENT
Start: 2020-09-23 | End: 2020-09-30 | Stop reason: HOSPADM

## 2020-09-23 RX ORDER — FERROUS GLUCONATE 324(37.5)
324 TABLET ORAL
Status: DISCONTINUED | OUTPATIENT
Start: 2020-09-23 | End: 2020-09-23

## 2020-09-23 RX ORDER — GABAPENTIN 100 MG/1
100 CAPSULE ORAL 3 TIMES DAILY
Status: DISCONTINUED | OUTPATIENT
Start: 2020-09-23 | End: 2020-09-30 | Stop reason: HOSPADM

## 2020-09-23 RX ORDER — PROMETHAZINE HYDROCHLORIDE 12.5 MG/1
12.5 TABLET ORAL EVERY 6 HOURS PRN
Status: DISCONTINUED | OUTPATIENT
Start: 2020-09-23 | End: 2020-09-30 | Stop reason: HOSPADM

## 2020-09-23 RX ORDER — QUINIDINE GLUCONATE 324 MG
240 TABLET, EXTENDED RELEASE ORAL
Status: DISCONTINUED | OUTPATIENT
Start: 2020-09-23 | End: 2020-09-23 | Stop reason: CLARIF

## 2020-09-23 RX ORDER — ACETAMINOPHEN 650 MG/1
650 SUPPOSITORY RECTAL EVERY 6 HOURS PRN
Status: DISCONTINUED | OUTPATIENT
Start: 2020-09-23 | End: 2020-09-30 | Stop reason: HOSPADM

## 2020-09-23 RX ORDER — LISINOPRIL 20 MG/1
40 TABLET ORAL DAILY
Status: DISCONTINUED | OUTPATIENT
Start: 2020-09-23 | End: 2020-09-30 | Stop reason: HOSPADM

## 2020-09-23 RX ORDER — BACLOFEN 10 MG/1
10 TABLET ORAL 3 TIMES DAILY PRN
Status: DISCONTINUED | OUTPATIENT
Start: 2020-09-23 | End: 2020-09-30 | Stop reason: HOSPADM

## 2020-09-23 RX ORDER — 0.9 % SODIUM CHLORIDE 0.9 %
1000 INTRAVENOUS SOLUTION INTRAVENOUS ONCE
Status: COMPLETED | OUTPATIENT
Start: 2020-09-23 | End: 2020-09-23

## 2020-09-23 RX ORDER — SODIUM CHLORIDE 0.9 % (FLUSH) 0.9 %
10 SYRINGE (ML) INJECTION PRN
Status: DISCONTINUED | OUTPATIENT
Start: 2020-09-23 | End: 2020-09-30 | Stop reason: HOSPADM

## 2020-09-23 RX ORDER — DEXTROSE MONOHYDRATE 25 G/50ML
12.5 INJECTION, SOLUTION INTRAVENOUS PRN
Status: DISCONTINUED | OUTPATIENT
Start: 2020-09-23 | End: 2020-09-30 | Stop reason: HOSPADM

## 2020-09-23 RX ORDER — ONDANSETRON 2 MG/ML
4 INJECTION INTRAMUSCULAR; INTRAVENOUS EVERY 6 HOURS PRN
Status: DISCONTINUED | OUTPATIENT
Start: 2020-09-23 | End: 2020-09-30 | Stop reason: HOSPADM

## 2020-09-23 RX ORDER — FERROUS GLUCONATE 324(37.5)
324 TABLET ORAL 2 TIMES DAILY
Status: DISCONTINUED | OUTPATIENT
Start: 2020-09-24 | End: 2020-09-30 | Stop reason: HOSPADM

## 2020-09-23 RX ORDER — SODIUM CHLORIDE 0.9 % (FLUSH) 0.9 %
10 SYRINGE (ML) INJECTION EVERY 12 HOURS SCHEDULED
Status: DISCONTINUED | OUTPATIENT
Start: 2020-09-23 | End: 2020-09-30 | Stop reason: HOSPADM

## 2020-09-23 RX ORDER — ALBUTEROL SULFATE 90 UG/1
2 AEROSOL, METERED RESPIRATORY (INHALATION) 4 TIMES DAILY
Status: DISCONTINUED | OUTPATIENT
Start: 2020-09-23 | End: 2020-09-30 | Stop reason: HOSPADM

## 2020-09-23 RX ORDER — LEVOFLOXACIN 5 MG/ML
750 INJECTION, SOLUTION INTRAVENOUS EVERY 24 HOURS
Status: DISCONTINUED | OUTPATIENT
Start: 2020-09-23 | End: 2020-09-26

## 2020-09-23 RX ORDER — POLYETHYLENE GLYCOL 3350 17 G/17G
17 POWDER, FOR SOLUTION ORAL DAILY PRN
Status: DISCONTINUED | OUTPATIENT
Start: 2020-09-23 | End: 2020-09-30 | Stop reason: HOSPADM

## 2020-09-23 RX ORDER — NICOTINE POLACRILEX 4 MG
15 LOZENGE BUCCAL PRN
Status: DISCONTINUED | OUTPATIENT
Start: 2020-09-23 | End: 2020-09-30 | Stop reason: HOSPADM

## 2020-09-23 RX ADMIN — LEVOFLOXACIN 750 MG: 5 INJECTION, SOLUTION INTRAVENOUS at 20:14

## 2020-09-23 RX ADMIN — ENOXAPARIN SODIUM 100 MG: 100 INJECTION SUBCUTANEOUS at 20:53

## 2020-09-23 RX ADMIN — IPRATROPIUM BROMIDE 2 PUFF: 17 AEROSOL, METERED RESPIRATORY (INHALATION) at 23:18

## 2020-09-23 RX ADMIN — Medication 10 ML: at 20:49

## 2020-09-23 RX ADMIN — ONDANSETRON 4 MG: 2 INJECTION INTRAMUSCULAR; INTRAVENOUS at 21:29

## 2020-09-23 RX ADMIN — SODIUM CHLORIDE 1000 ML: 9 INJECTION, SOLUTION INTRAVENOUS at 15:03

## 2020-09-23 RX ADMIN — ESCITALOPRAM OXALATE 20 MG: 10 TABLET ORAL at 20:49

## 2020-09-23 RX ADMIN — GABAPENTIN 100 MG: 100 CAPSULE ORAL at 20:49

## 2020-09-23 RX ADMIN — POTASSIUM CHLORIDE 10 MEQ: 10 TABLET, EXTENDED RELEASE ORAL at 20:49

## 2020-09-23 RX ADMIN — MULTIPLE VITAMINS W/ MINERALS TAB 1 TABLET: TAB at 20:49

## 2020-09-23 RX ADMIN — IOPAMIDOL 90 ML: 755 INJECTION, SOLUTION INTRAVENOUS at 15:36

## 2020-09-23 RX ADMIN — LISINOPRIL 40 MG: 20 TABLET ORAL at 20:49

## 2020-09-23 RX ADMIN — DEXAMETHASONE SODIUM PHOSPHATE 6 MG: 10 INJECTION, SOLUTION INTRAMUSCULAR; INTRAVENOUS at 17:35

## 2020-09-23 RX ADMIN — BUMETANIDE 2 MG: 1 TABLET ORAL at 20:49

## 2020-09-23 RX ADMIN — ALBUTEROL SULFATE 2 PUFF: 90 AEROSOL, METERED RESPIRATORY (INHALATION) at 23:18

## 2020-09-23 RX ADMIN — CEFTRIAXONE 1 G: 1 INJECTION, POWDER, FOR SOLUTION INTRAMUSCULAR; INTRAVENOUS at 17:34

## 2020-09-23 RX ADMIN — Medication 500 MG: at 17:34

## 2020-09-23 ASSESSMENT — PAIN SCALES - GENERAL
PAINLEVEL_OUTOF10: 0
PAINLEVEL_OUTOF10: 8

## 2020-09-23 ASSESSMENT — ENCOUNTER SYMPTOMS
SHORTNESS OF BREATH: 0
NAUSEA: 0
DIARRHEA: 0
VOMITING: 0
ABDOMINAL PAIN: 0
COUGH: 0
SORE THROAT: 0
RHINORRHEA: 0
BACK PAIN: 1

## 2020-09-23 ASSESSMENT — PAIN DESCRIPTION - LOCATION: LOCATION: FLANK;HEAD

## 2020-09-23 ASSESSMENT — PAIN DESCRIPTION - ORIENTATION: ORIENTATION: LEFT

## 2020-09-23 NOTE — PROGRESS NOTES
Results for Leena Pham (MRN [id number]) as of 9/23/2020 17:33   Ref.  Range 9/23/2020 17:33   Hemoglobin, Art, Extended Latest Ref Range: 12.0 - 16.0 g/dL 11.2 (L)   pH, Arterial Latest Ref Range: 7.350 - 7.450  7.460 (H)   pCO2, Arterial Latest Ref Range: 35.0 - 45.0 mmHg 27.0 (L)   pO2, Arterial Latest Ref Range: 80.0 - 100.0 mmHg 60.0 (L)   HCO3, Arterial Latest Ref Range: 22.0 - 26.0 mmol/L 19.2 (L)   Base Excess, Arterial Latest Ref Range: -2.0 - 2.0 mmol/L -3.5 (L)   O2 Sat, Arterial Latest Ref Range: >92 % 92.2   O2 Content, Arterial Latest Ref Range: Not Established mL/dL 14.5   Methemoglobin, Arterial Latest Ref Range: <1.5 % 0.7   Carboxyhgb, Arterial Latest Ref Range: 0.0 - 5.0 % 1.4   Pt on room air, rr, AT+

## 2020-09-23 NOTE — ED PROVIDER NOTES
Positive for headaches. Negative for dizziness. All other systems reviewed and are negative.            PAST MEDICALHISTORY     Past Medical History:   Diagnosis Date    Abdominal pain     Abnormal EKG     Acute sinusitis     Allergic reaction to spider bite     Anemia     Anticoagulated     Anxiety     Arrhythmia     Asthmatic bronchitis without complication 8/14/0611    Ataxic gait     Lyons's esophagus     Bowel obstruction (HCC)     Callus     Cardiac pacemaker     CKD (chronic kidney disease) stage 2, GFR 60-89 ml/min     Coat's syndrome     Coat's syndrome     both eyes    Depression     Diabetes mellitus type 2 in nonobese (HCC)     Diabetic nephropathy (HCC)     Disequilibrium     Dizziness     DVT (deep venous thrombosis) (HCC)     Exudative retinopathy     Fibromyalgia     Fibromyositis     Gastric ulcer     GERD (gastroesophageal reflux disease)     History of gastric bypass     Hx of lupus anticoagulant disorder     Hypertension     Hypothyroidism     Intermittent claudication (HCC)     Intestinal obstruction (HCC)     Iron deficiency     Left-sided weakness     Low vitamin D level     Lupus (HCC)     Menopause     Obesity     Osteoarthritis     Osteoporosis     Other iron deficiency anemias     Pernicious anemia     PUPP (pruritic urticarial papules and plaques of pregnancy)     Right leg numbness     Right sided sciatica     Sarcoidosis     with liver involvement    Sciatica     Secondary hyperparathyroidism ([address]     Shingles     Shortness of breath     Sleep apnea     Stomach ulcer     Syncope     Type II diabetes mellitus with nephropathy (HCC)     Visual loss, one eye          SURGICAL HISTORY       Past Surgical History:   Procedure Laterality Date    APPENDECTOMY      CARDIAC [facility]  10/21/13  [facility]    EF over 60%    CHOLECYSTECTOMY      COLONOSCOPY  2/2010    negative    COLONOSCOPY  2/22/10    Dr Rowan Screws 4/1/16    Dr LAKHWINDER Horvath-internal hemorrhoids, 5 yr recall    EYE SURGERY      Cyst on Right eye    EYE SURGERY      GASTRIC BYPASS SURGERY      GASTRIC BYPASS SURGERY      HERNIA REPAIR      HYSTERECTOMY      Complete    HYSTERECTOMY      Partial - because had a tubal pregnancy.  INCONTINENCE SURGERY      Bladder Sling    OTHER SURGICAL HISTORY      IVC filter    PACEMAKER INSERTION      PACEMAKER PLACEMENT      medtronic    [place] TOTAL KNEE ARTHROPLASTY Right 3/26/2018    TOTAL KNEE REPLACEMENT COMPLEX PRIMARY performed by Izabel Victoria MD at [facility]      SPLENECTOMY      KRISTEL AND BSO      TONSILLECTOMY AND ADENOIDECTOMY      UPPER GASTROINTESTINAL ENDOSCOPY  12/2011    gerd s/p gastric bypass    UPPER GASTROINTESTINAL ENDOSCOPY  2/2014    normal s/p gastric bypass    UPPER GASTROINTESTINAL ENDOSCOPY  2/2010    biopsy neg Barretts, chronic reflux esophagitis s/p gastric bypass    UPPER GASTROINTESTINAL ENDOSCOPY  7/2006    unremarkable s/p gastric bypass    UPPER GASTROINTESTINAL ENDOSCOPY  8/10/15    Dr Libby Adams ENDOSCOPY  4/1/16    Dr Betsy Johnson ENDOSCOPY N/A 4/1/2016    EGD ESOPHAGOGASTRODUODENOSCOPY performed by Mateo Luz MD at [facility]    [facility] 2003         CURRENT MEDICATIONS     Previous Medications    ASPIRIN 81 MG TABLET    Take 81 mg by mouth daily    BACLOFEN (LIORESAL) 10 MG TABLET    Take 1 tablet by mouth 3 times daily As needed for hip pain    BUMETANIDE (BUMEX) 2 MG TABLET    Take 1 tablet by mouth daily    CALCIPOTRIENE (DOVONEX) 0.005 % CREAM    Use topically as needed    CLOBETASOL (TEMOVATE) 0.05 % CREAM    Apply topically 2 times daily.     CLONIDINE (CATAPRES) 0.1 MG TABLET    Take 1 tablet by mouth 2 times daily As needed for BP greater than 170/110    CYANOCOBALAMIN 1000 MCG/ML INJECTION    Inject 1 mL into the muscle once for 1 Grandmother     Cervical Cancer Maternal Grandmother     Diabetes Maternal Grandmother     Cervical Cancer Sister     Other Sister         fibromyalgia    Diabetes Paternal Aunt     Esophageal Cancer Neg Hx     Liver Cancer Neg Hx     Liver Disease Neg Hx     Stomach Cancer Neg Hx     Rectal Cancer Neg Hx           SOCIAL HISTORY       Social History     Socioeconomic History    Marital status:      Spouse name: None    Number of children: None    Years of education: None    Highest education level: None   Occupational History     Employer: [employer]    Social Needs    Financial resource strain: None    Food insecurity     Worry: None     Inability: None    Transportation needs     Medical: None     Non-medical: None   Tobacco Use    Smoking status: Never Smoker    Smokeless tobacco: Never Used   Substance and Sexual Activity    Alcohol use: No    Drug use: No    Sexual activity: Not Currently   Lifestyle    Physical activity     Days per week: None     Minutes per session: None    Stress: None   Relationships    Social connections     Talks on phone: None     Gets together: None     Attends Orthodoxy service: None     Active member of club or organization: None     Attends meetings of clubs or organizations: None     Relationship status: None    Intimate partner violence     Fear of current or ex partner: None     Emotionally abused: None     Physically abused: None     Forced sexual activity: None   Other Topics Concern    None   Social History Narrative    None       SCREENINGS             PHYSICAL EXAM    (up to 7 for level 4, 8 or more for level 5)     ED Triage Vitals [09/23/20 1427]   BP Temp Temp Source Pulse Resp SpO2 Height Weight   (!) 171/106 97.3 °F (36.3 °C) Oral 62 16 93 % -- --       Physical Exam  Vitals signs and nursing note reviewed. Constitutional:       General: She is not in acute distress. Appearance: Normal appearance. She is well-developed.  She is obese. She is not ill-appearing, toxic-appearing or diaphoretic. HENT:      Head: Normocephalic and atraumatic. Right Ear: External ear normal.      Left Ear: External ear normal.      Nose: Nose normal.      Mouth/Throat:      Mouth: Mucous membranes are dry. Eyes:      Conjunctiva/sclera: Conjunctivae normal.   Neck:      Musculoskeletal: Normal range of motion. Trachea: No tracheal deviation. Cardiovascular:      Rate and Rhythm: Normal rate and regular rhythm. Heart sounds: Normal heart sounds. No murmur. Pulmonary:      Effort: No respiratory distress. Breath sounds: Normal breath sounds. No wheezing or rales. Abdominal:      Palpations: Abdomen is soft. There is no mass. Tenderness: There is no abdominal tenderness. There is right CVA tenderness and left CVA tenderness. There is no guarding or rebound. Musculoskeletal: Normal range of motion. General: No deformity. Cervical back: She exhibits normal range of motion and no bony tenderness. Thoracic back: She exhibits bony tenderness. Lumbar back: She exhibits bony tenderness. Right lower leg: No edema. Left lower leg: No edema. Skin:     General: Skin is warm and dry. Neurological:      Mental Status: She is alert and oriented to person, place, and time. GCS: GCS eye subscore is 4. GCS verbal subscore is 5. GCS motor subscore is 6. Cranial Nerves: No dysarthria or facial asymmetry.       Comments: Globally weak 3/5         DIAGNOSTIC RESULTS     EKG: All EKG's areinterpreted by the Emergency Department Physician who either signs or Co-signs this chart in the absence of a cardiologist.    60 normal sinus rhythm, nondiagnostic EKG    RADIOLOGY:  Non-plain film images such as CT, Ultrasound and MRI are read by the radiologist. Plain radiographic images are visualized and preliminarily interpreted bythe emergency physician with the below findings:        Pj Abnormal; Notable for the following components:       Result Value    RBC 4.16 (*)     MCHC 32.3 (*)     RDW 15.1 (*)     Neutrophils % 72.0 (*)     Lymphocytes % 18.0 (*)     Myelocyte Percent 1 (*)     All other components within normal limits   COMPREHENSIVE METABOLIC PANEL - Abnormal; Notable for the following components:    Glucose 112 (*)     CREATININE 1.2 (*)     GFR Non- 44 (*)     GFR  54 (*)     AST 56 (*)     All other components within normal limits   URINE RT REFLEX TO CULTURE - Abnormal; Notable for the following components:    Protein, UA 30 (*)     All other components within normal limits   COVID-19 - Abnormal; Notable for the following components:    SARS-CoV-2, NAAT DETECTED (*)     All other components within normal limits    Narrative:     CALL  [facility] tel. ,  Results called to [address] [place] [address], 09/23/2020 16:55, by GIRXU   BLOOD GAS, ARTERIAL - Abnormal; Notable for the following components:    pH, Arterial 7.460 (*)     pCO2, Arterial 27.0 (*)     pO2, Arterial 60.0 (*)     HCO3, Arterial 19.2 (*)     Base Excess, Arterial -3.5 (*)     Hemoglobin, Art, Extended 11.2 (*)     All other components within normal limits   MICROSCOPIC URINALYSIS - Abnormal; Notable for the following components:    Bacteria, UA TRACE (*)     Crystals, UA NEG (*)     All other components within normal limits   CULTURE, BLOOD 1   CULTURE, BLOOD 2   PROTIME-INR   TSH WITH REFLEX TO FT4   TROPONIN   BRAIN NATRIURETIC PEPTIDE   LACTIC ACID, PLASMA   POTASSIUM, WHOLE BLOOD   POTASSIUM, WHOLE BLOOD       All other labs were within normal range or not returned as of this dictation.     EMERGENCY DEPARTMENT COURSE and DIFFERENTIAL DIAGNOSIS/MDM:   Vitals:    Vitals:    09/23/20 1502 09/23/20 1533 09/23/20 1604 09/23/20 1628   BP: (!) 178/116 (!) 187/99 101/78    Pulse:       Resp:       Temp:       TempSrc:       SpO2:   (!) 82% 93%       MDM  Number of Diagnoses or Management Options Amount and/or Complexity of Data Reviewed  Clinical lab tests: ordered and reviewed  Tests in the radiology section of CPT®: reviewed and ordered  Independent visualization of images, tracings, or specimens: yes      Progressive worsening fatigue since having a fall approximately 3 weeks ago, decreased appetite and p.o. intake not taking her medicine sent here for concern dehydration. Blood work overall reassuring and she is nontoxic-appearing. Numerous areas painful on exam so proceeded with additional imaging from what she had recently had a outpatient visit, chest x-ray was negative however instantly thoracic spine and visualized in the lungs concern of a multifocal pneumonia versus pulmonary edema however she does not appear volume overloaded and given her vague complaints proceeded with covid 19 testing and unfortunately she is found to be positive, she is on room air currently and in no respiratory distress, discussed with Dr. Cheryl Iglesias for admission and ongoing treatment      CONSULTS:  IP CONSULT TO SOCIAL WORK    PROCEDURES:  Unless otherwise noted below, none     Procedures    FINAL IMPRESSION      1. Pneumonia due to COVID-19 virus    2. Generalized weakness    3. Acute midline low back pain without sciatica    4. Flank pain    5. Nonintractable headache, unspecified chronicity pattern, unspecified headache type    6. Closed head injury, initial encounter          DISPOSITION/PLAN   DISPOSITION Decision To Admit 09/23/2020 06:17:42 PM      PATIENT REFERRED TO:  No follow-up provider specified.     DISCHARGE MEDICATIONS:  New Prescriptions    No medications on file          (Please note that portions of this note were completed with a voice recognition program.  Efforts were made to edit thedictations but occasionally words are mis-transcribed.)    Aida Saldivar MD (electronically signed)  Attending Emergency Physician        Catarina Maxwell MD  09/23/20 3118

## 2020-09-23 NOTE — H&P
Hospital Medicine  History and Physical    Patient: Arlene Grove  MRN: [id number]    CHIEF COMPLAINT:  weakness    History Obtained From: patient, chart    PCP: Paco Lazar MD    HISTORY OF PRESENT ILLNESS:  69year old black female presents to the emergency department with complaint of generalized weakness and fatigue since fall from bed 3 weeks ago. She was seen at her primary care provider's office yesterday and underwent imaging, was directed to come to the hospital after review. She denies any significant shortness of breath, fever, chills, or cough above her baseline (Restrictive lung disease). She does have a coworker at [facility] [employer] who tested positive for COVID-19. Patient is unsure why she fell initially and states she was well prior, but was too weak to get off the floor. She is maintaining saturation on room air. No treatment prior to arrival aside from home medications. Patient is fatigued and forgetful, is a poor historian. REVIEW OF SYSTEMS:  14 systems reviewed and negative except as noted above.     Past Medical History:      Diagnosis Date    Abdominal pain     Abnormal EKG     Acute sinusitis     Allergic reaction to spider bite     Anemia     Anticoagulated     Anxiety     Arrhythmia     Asthmatic bronchitis without complication 3/72/9931    Ataxic gait     Lyons's esophagus     Bowel obstruction (HCC)     Callus     Cardiac pacemaker     CKD (chronic kidney disease) stage 2, GFR 60-89 ml/min     Coat's syndrome     Coat's syndrome     both eyes    Depression     Diabetes mellitus type 2 in nonobese (HCC)     Diabetic nephropathy (HCC)     Disequilibrium     Dizziness     DVT (deep venous thrombosis) ([facility])     Exudative retinopathy     Fibromyalgia     Fibromyositis     Gastric ulcer     GERD (gastroesophageal reflux disease)     History of gastric bypass     Hx of lupus anticoagulant disorder     Hypertension     Hypothyroidism     Intermittent claudication ([address]     Intestinal obstruction (HCC)     Iron deficiency     Left-sided weakness     Low vitamin D level     Lupus (HCC)     Menopause     Obesity     Osteoarthritis     Osteoporosis     Other iron deficiency anemias     Pernicious anemia     PUPP (pruritic urticarial papules and plaques of pregnancy)     Right leg numbness     Right sided sciatica     Sarcoidosis     with liver involvement    Sciatica     Secondary hyperparathyroidism ([address]     Shingles     Shortness of breath     Sleep apnea     Stomach ulcer     Syncope     Type II diabetes mellitus with nephropathy (HCC)     Visual loss, one eye        Past Surgical History:      Procedure Laterality Date    APPENDECTOMY      C[facility]    EF over 60%    CHOLECYSTECTOMY      COLONOSCOPY  2/2010    negative    COLONOSCOPY  2/22/10    Dr Perico Morris    COLONOSCOPY  4/1/16    Dr LAKHWINDER Horvath-internal hemorrhoids, 5 yr recall    EYE SURGERY      Cyst on Right eye    EYE SURGERY      GASTRIC BYPASS SURGERY      GASTRIC BYPASS SURGERY      HERNIA REPAIR      HYSTERECTOMY      Complete    HYSTERECTOMY      Partial - because had a tubal pregnancy.      INCONTINENCE SURGERY      Bladder Sling    OTHER SURGICAL HISTORY      IVC filter    PACEMAKER INSERTION      PACEMAKER PLACEMENT      medtronic    [place] TOTAL KNEE ARTHROPLASTY Right 3/26/2018    TOTAL KNEE REPLACEMENT COMPLEX PRIMARY performed by Sulema Dale MD at [address] [place] [address],[address]      SMALL INTESTINE SURGERY      SPLENECTOMY      KRISTEL AND BSO      TONSILLECTOMY AND ADENOIDECTOMY      UPPER GASTROINTESTINAL ENDOSCOPY  12/2011    gerd s/p gastric bypass    UPPER GASTROINTESTINAL ENDOSCOPY  2/2014    normal s/p gastric bypass    UPPER GASTROINTESTINAL ENDOSCOPY  2/2010    biopsy neg Barretts, chronic reflux esophagitis s/p gastric bypass    UPPER GASTROINTESTINAL ENDOSCOPY  7/2006    unremarkable s/p gastric bypass    UPPER GASTROINTESTINAL ENDOSCOPY  8/10/15    Dr Ayah Lugo ENDOSCOPY  4/1/16    Dr Paras Tolentino    UPPER GASTROINTESTINAL ENDOSCOPY N/A 4/1/2016    EGD ESOPHAGOGASTRODUODENOSCOPY performed by Farida Hdez MD at [facility]    [address]       Medications Prior to Admission:    Prior to Admission medications    Medication Sig Start Date End Date Taking? Authorizing Provider   gabapentin (NEURONTIN) 100 MG capsule Take 1 capsule by mouth 3 times daily for 90 days. 9/3/20 12/2/20 Yes Braden Cervantes MD   baclofen (LIORESAL) 10 MG tablet Take 1 tablet by mouth 3 times daily As needed for hip pain 7/17/20  Yes Braden Cervantes MD   vitamin D (ERGOCALCIFEROL) 1.25 MG (93388 UT) CAPS capsule Take 1 capsule by mouth Twice a Week 7/20/20  Yes Braden Cervantes MD   calcipotriene (DOVONEX) 0.005 % cream Use topically as needed 7/7/20  Yes Braden Cervantes MD   clobetasol (TEMOVATE) 0.05 % cream Apply topically 2 times daily. 7/7/20  Yes Braden Cervantes MD   fexofenadine (ALLEGRA) 180 MG tablet Take 1 tablet by mouth daily Indications:  Allergic Rhinitis 4/1/20  Yes Braden Cervantes MD   pantoprazole (PROTONIX) 40 MG tablet Take 1 tablet by mouth 2 times daily (before meals) 3/31/20  Yes Braden Cervantes MD   levothyroxine (SYNTHROID) 100 MCG tablet Take 1 tablet by mouth Daily 3/30/20  Yes Braden Cervantes MD   diclofenac sodium 1 % GEL Apply 4 g topically 4 times daily 3/6/20  Yes Braden Cervantes MD   ondansetron (ZOFRAN) 4 MG tablet Take 1 tablet by mouth daily as needed for Nausea or Vomiting 1/31/20  Yes Braden Cervantes MD   warfarin (COUMADIN) 7.5 MG tablet 2 tablets daily, DO NOT RESUME UNTIL YOU RECHECK YOUR INR ON THURSDAY AND DISCUSS WITH PCP 1/28/20  Yes Margarita Lau MD   cloNIDine (CATAPRES) 0.1 MG tablet Take 1 tablet by mouth 2 times daily As needed for BP greater than 170/110 1/15/20  Yes Braden Cervantes MD   Multiple Vitamins-Minerals (CENTRUM ADULTS) TABS Take 1 tablet by mouth daily   Yes Historical Provider, MD   escitalopram (LEXAPRO) 20 MG tablet Take 1 tablet by mouth daily 1/13/20  Yes Tiffani Tomlinson MD   lisinopril (PRINIVIL;ZESTRIL) 40 MG tablet Take 1 tablet by mouth daily 1/13/20  Yes Tiffani Tomlinson MD   bumetanide (BUMEX) 2 MG tablet Take 1 tablet by mouth daily 1/13/20  Yes Tiffani Tomlinson MD   tiotropium (Geron[place]) 18 MCG inhalation capsule Inhale 1 capsule into the lungs daily 1/13/20  Yes Tiffani Tomlinson MD   potassium chloride (KLOR-CON M) 10 MEQ extended release tablet Take 1 tablet by mouth daily 1/13/20  Yes Tiffani Tomlinson MD   ferrous gluconate (FERGON) 240 (27 Fe) MG tablet Take 1 tablet by mouth 3 times daily (with meals) 1/13/20  Yes Tiffani Tomlinson MD   aspirin 81 MG tablet Take 81 mg by mouth daily   Yes Historical Provider, MD   cyanocobalamin 1000 MCG/ML injection Inject 1 mL into the muscle once for 1 dose 7/7/20 9/22/20  Tiffani Tomlinson MD   pregabalin (LYRICA) 75 MG capsule Take 1 capsule by mouth 3 times daily for 90 days. 1/13/20 4/12/20  Tiffani Tomlinson MD       Allergies:  Insect extract allergy skin test; Lactose intolerance (gi); Prednisone; Zanaflex [tizanidine hcl]; Lortab [hydrocodone-acetaminophen]; Other; Oxycodone-acetaminophen; and Ultram [tramadol hcl]    Social History:   TOBACCO:   reports that she has never smoked. She has never used smokeless tobacco.  ETOH:   reports no history of alcohol use.     Family History:       Problem Relation Age of Onset    Uterine Cancer Mother     Cervical Cancer Mother     Coronary Art Dis Mother     Heart Disease Father     Lung Cancer Father     Other Father         renal failure    Colon Cancer Brother     Colon Polyps Brother     Uterine Cancer Maternal Grandmother     Cervical Cancer Maternal Grandmother     Diabetes Maternal Grandmother     Cervical Cancer Sister     Other Sister         fibromyalgia    Diabetes Paternal Aunt  Esophageal Cancer Neg Hx     Liver Cancer Neg Hx     Liver Disease Neg Hx     Stomach Cancer Neg Hx     Rectal Cancer Neg Hx            Physical Exam:    Vitals: /78   Pulse 62   Temp 97.3 °F (36.3 °C) (Oral)   Resp 16   SpO2 93%   24HR INTAKE/OUTPUT:      Intake/Output Summary (Last 24 hours) at 9/23/2020 1809  Last data filed at 9/23/2020 1603  Gross per 24 hour   Intake 1000 ml   Output --   Net 1000 ml     General appearance: alert and cooperative with exam  HEENT: atraumatic, eyes with clear conjunctiva and normal lids, pupils and irises normal, external ears and nose are normal, lips normal. Neck without masses, lympadenopathy, bruit, thyroid normal  Lungs: no increased work of breathing, diminished breath sounds bilaterally  Heart: regular rate and rhythm, S1, S2 normal, no murmur, click, rub or gallop  Abdomen: soft, non-tender; bowel sounds normal; no masses,  no organomegaly  Extremities: extremities normal without clubbing, atraumatic, no cyanosis or edema  Neurologic: No focal neurologic deficits, normal sensation, alert and oriented, affect and mood appropriate. Skin: no rashes, nodules. CBC:   Recent Labs     09/23/20  1435   WBC 9.8   HGB 12.0        BMP:    Recent Labs     09/21/20  1305 09/23/20  1435 09/23/20  1720 09/23/20  1733    138  --   --    K 3.9 4.0 3.7 3.7    102  --   --    CO2 20* 22  --   --    BUN 18 18  --   --    CREATININE 1.2* 1.2*  --   --    GLUCOSE 119* 112*  --   --      Hepatic:   Recent Labs     09/21/20  1305 09/23/20  1435   AST 45* 56*   ALT 17 22   BILITOT 0.9 1.0   ALKPHOS 68 66     Troponin T:   Recent Labs     09/23/20  1435   TROPONINI <0.01     ABGs: No results for input(s): PH, PCO2, PO2, HCO3, BE, O2SAT in the last 72 hours. INR:   Recent Labs     09/23/20  1435   INR 1.11     Lactic Acid: No results for input(s): LACTA in the last 72 hours.     URINALYSIS: proteinuria  -----------------------------------------------------------------  PA/lat CXR: shallow inspiration    See other imaging findings    Assessment and Plan   Principal Problem:    COVID-19  Active Problems:    Iron deficiency anemia    Restrictive airway disease    DVT, lower extremity, proximal, acute, unspecified laterality (HCC)    Type II diabetes mellitus with nephropathy (HCC)    Generalized weakness    Accidental fall from bed  Resolved Problems:    * No resolved hospital problems. *      Admit to CCU. Patient is on room air. No indication for dexamethasone, remdesivir, or other treatment. Will convert to enoxaparin for now. Patient is on anticoagulation for history of DVT, s/p [place] filter. PT and OT evaluations. Patient will likely need placement but may need to be a certain length of time out from positive COVID test prior to placement. Home medications reconciled.     Kunal Yates DO  Admitting Hospitalist

## 2020-09-24 ENCOUNTER — TELEPHONE (OUTPATIENT)
Dept: INTERNAL MEDICINE | Age: 70
End: 2020-09-24

## 2020-09-24 PROBLEM — Z51.5 PALLIATIVE CARE PATIENT: Status: ACTIVE | Noted: 2020-09-24

## 2020-09-24 LAB
ALBUMIN SERPL-MCNC: 3.6 G/DL (ref 3.5–5.2)
ALP BLD-CCNC: 63 U/L (ref 35–104)
ALT SERPL-CCNC: 24 U/L (ref 5–33)
ANION GAP SERPL CALCULATED.3IONS-SCNC: 15 MMOL/L (ref 7–19)
ANISOCYTOSIS: ABNORMAL
APTT: 44.5 SEC (ref 26–36.2)
AST SERPL-CCNC: 64 U/L (ref 5–32)
ATYPICAL LYMPHOCYTE RELATIVE PERCENT: 2 % (ref 0–8)
BANDED NEUTROPHILS RELATIVE PERCENT: 5 % (ref 0–5)
BASOPHILS ABSOLUTE: 0 K/UL (ref 0–0.2)
BASOPHILS RELATIVE PERCENT: 0 % (ref 0–1)
BILIRUB SERPL-MCNC: 0.6 MG/DL (ref 0.2–1.2)
BUN BLDV-MCNC: 18 MG/DL (ref 8–23)
BURR CELLS: ABNORMAL
CALCIUM SERPL-MCNC: 8.8 MG/DL (ref 8.8–10.2)
CHLORIDE BLD-SCNC: 104 MMOL/L (ref 98–111)
CO2: 17 MMOL/L (ref 22–29)
CREAT SERPL-MCNC: 1.1 MG/DL (ref 0.5–0.9)
D DIMER: 16.91 UG/ML FEU (ref 0–0.48)
EKG P AXIS: -15 DEGREES
EKG P-R INTERVAL: 150 MS
EKG Q-T INTERVAL: 494 MS
EKG QRS DURATION: 112 MS
EKG QTC CALCULATION (BAZETT): 493 MS
EKG T AXIS: -18 DEGREES
EOSINOPHILS ABSOLUTE: 0 K/UL (ref 0–0.6)
EOSINOPHILS RELATIVE PERCENT: 0 % (ref 0–5)
FERRITIN: 650.4 NG/ML (ref 13–150)
FIBRINOGEN: 399 MG/DL (ref 238–505)
GFR AFRICAN AMERICAN: >59
GFR NON-AFRICAN AMERICAN: 49
GLUCOSE BLD-MCNC: 108 MG/DL (ref 70–99)
GLUCOSE BLD-MCNC: 109 MG/DL (ref 70–99)
GLUCOSE BLD-MCNC: 124 MG/DL (ref 70–99)
GLUCOSE BLD-MCNC: 141 MG/DL (ref 74–109)
HCT VFR BLD CALC: 35.2 % (ref 37–47)
HEMOGLOBIN: 11.4 G/DL (ref 12–16)
IMMATURE GRANULOCYTES #: 0.2 K/UL
INR BLD: 1.18 (ref 0.88–1.18)
LACTATE DEHYDROGENASE: 423 U/L (ref 91–215)
LYMPHOCYTES ABSOLUTE: 1.1 K/UL (ref 1.1–4.5)
LYMPHOCYTES RELATIVE PERCENT: 12 % (ref 20–40)
MCH RBC QN AUTO: 28.9 PG (ref 27–31)
MCHC RBC AUTO-ENTMCNC: 32.4 G/DL (ref 33–37)
MCV RBC AUTO: 89.1 FL (ref 81–99)
MONOCYTES ABSOLUTE: 0.2 K/UL (ref 0–0.9)
MONOCYTES RELATIVE PERCENT: 2 % (ref 0–10)
MONONUCLEAR UNIDENTIFIED CELLS: 1 %
NEUTROPHILS ABSOLUTE: 6.6 K/UL (ref 1.5–7.5)
NEUTROPHILS RELATIVE PERCENT: 78 % (ref 50–65)
OVALOCYTES: ABNORMAL
PDW BLD-RTO: 14.9 % (ref 11.5–14.5)
PERFORMED ON: ABNORMAL
PLATELET # BLD: 221 K/UL (ref 130–400)
PLATELET SLIDE REVIEW: ADEQUATE
PMV BLD AUTO: 11.9 FL (ref 9.4–12.3)
POTASSIUM REFLEX MAGNESIUM: 4.2 MMOL/L (ref 3.5–5)
PROTHROMBIN TIME: 15 SEC (ref 12–14.6)
RBC # BLD: 3.95 M/UL (ref 4.2–5.4)
SODIUM BLD-SCNC: 136 MMOL/L (ref 136–145)
TOTAL PROTEIN: 7.8 G/DL (ref 6.6–8.7)
WBC # BLD: 8 K/UL (ref 4.8–10.8)

## 2020-09-24 PROCEDURE — 6370000000 HC RX 637 (ALT 250 FOR IP): Performed by: HOSPITALIST

## 2020-09-24 PROCEDURE — 2580000003 HC RX 258: Performed by: FAMILY MEDICINE

## 2020-09-24 PROCEDURE — 85384 FIBRINOGEN ACTIVITY: CPT

## 2020-09-24 PROCEDURE — 85730 THROMBOPLASTIN TIME PARTIAL: CPT

## 2020-09-24 PROCEDURE — 82728 ASSAY OF FERRITIN: CPT

## 2020-09-24 PROCEDURE — 6360000002 HC RX W HCPCS: Performed by: FAMILY MEDICINE

## 2020-09-24 PROCEDURE — 6370000000 HC RX 637 (ALT 250 FOR IP): Performed by: FAMILY MEDICINE

## 2020-09-24 PROCEDURE — 85379 FIBRIN DEGRADATION QUANT: CPT

## 2020-09-24 PROCEDURE — 80053 COMPREHEN METABOLIC PANEL: CPT

## 2020-09-24 PROCEDURE — 85610 PROTHROMBIN TIME: CPT

## 2020-09-24 PROCEDURE — 2100000000 HC CCU R&B

## 2020-09-24 PROCEDURE — 85025 COMPLETE CBC W/AUTO DIFF WBC: CPT

## 2020-09-24 PROCEDURE — 82947 ASSAY GLUCOSE BLOOD QUANT: CPT

## 2020-09-24 PROCEDURE — 83615 LACTATE (LD) (LDH) ENZYME: CPT

## 2020-09-24 PROCEDURE — 93010 ELECTROCARDIOGRAM REPORT: CPT | Performed by: INTERNAL MEDICINE

## 2020-09-24 RX ADMIN — PANTOPRAZOLE SODIUM 40 MG: 40 TABLET, DELAYED RELEASE ORAL at 06:22

## 2020-09-24 RX ADMIN — MULTIPLE VITAMINS W/ MINERALS TAB 1 TABLET: TAB at 09:02

## 2020-09-24 RX ADMIN — ENOXAPARIN SODIUM 100 MG: 100 INJECTION SUBCUTANEOUS at 09:03

## 2020-09-24 RX ADMIN — PANTOPRAZOLE SODIUM 40 MG: 40 TABLET, DELAYED RELEASE ORAL at 17:23

## 2020-09-24 RX ADMIN — FERROUS GLUCONATE TAB 324 MG (37.5 MG ELEMENTAL IRON) 324 MG: 324 (37.5 FE) TAB at 09:03

## 2020-09-24 RX ADMIN — FERROUS GLUCONATE TAB 324 MG (37.5 MG ELEMENTAL IRON) 324 MG: 324 (37.5 FE) TAB at 19:45

## 2020-09-24 RX ADMIN — ALBUTEROL SULFATE 2 PUFF: 90 AEROSOL, METERED RESPIRATORY (INHALATION) at 19:45

## 2020-09-24 RX ADMIN — IPRATROPIUM BROMIDE 2 PUFF: 17 AEROSOL, METERED RESPIRATORY (INHALATION) at 17:24

## 2020-09-24 RX ADMIN — GABAPENTIN 100 MG: 100 CAPSULE ORAL at 09:03

## 2020-09-24 RX ADMIN — POTASSIUM CHLORIDE 10 MEQ: 10 TABLET, EXTENDED RELEASE ORAL at 09:03

## 2020-09-24 RX ADMIN — IPRATROPIUM BROMIDE 2 PUFF: 17 AEROSOL, METERED RESPIRATORY (INHALATION) at 12:54

## 2020-09-24 RX ADMIN — IPRATROPIUM BROMIDE 2 PUFF: 17 AEROSOL, METERED RESPIRATORY (INHALATION) at 19:45

## 2020-09-24 RX ADMIN — GABAPENTIN 100 MG: 100 CAPSULE ORAL at 12:54

## 2020-09-24 RX ADMIN — ESCITALOPRAM OXALATE 20 MG: 10 TABLET ORAL at 09:03

## 2020-09-24 RX ADMIN — IPRATROPIUM BROMIDE 2 PUFF: 17 AEROSOL, METERED RESPIRATORY (INHALATION) at 09:04

## 2020-09-24 RX ADMIN — ENOXAPARIN SODIUM 100 MG: 100 INJECTION SUBCUTANEOUS at 19:47

## 2020-09-24 RX ADMIN — BUMETANIDE 2 MG: 1 TABLET ORAL at 09:02

## 2020-09-24 RX ADMIN — ALBUTEROL SULFATE 2 PUFF: 90 AEROSOL, METERED RESPIRATORY (INHALATION) at 17:23

## 2020-09-24 RX ADMIN — GABAPENTIN 100 MG: 100 CAPSULE ORAL at 19:45

## 2020-09-24 RX ADMIN — ALBUTEROL SULFATE 2 PUFF: 90 AEROSOL, METERED RESPIRATORY (INHALATION) at 09:03

## 2020-09-24 RX ADMIN — LEVOFLOXACIN 750 MG: 5 INJECTION, SOLUTION INTRAVENOUS at 19:45

## 2020-09-24 RX ADMIN — Medication 10 ML: at 19:45

## 2020-09-24 RX ADMIN — LEVOTHYROXINE SODIUM 100 MCG: 100 TABLET ORAL at 06:22

## 2020-09-24 RX ADMIN — ALBUTEROL SULFATE 2 PUFF: 90 AEROSOL, METERED RESPIRATORY (INHALATION) at 12:54

## 2020-09-24 RX ADMIN — LISINOPRIL 40 MG: 20 TABLET ORAL at 09:02

## 2020-09-24 RX ADMIN — Medication 10 ML: at 09:03

## 2020-09-24 ASSESSMENT — PAIN SCALES - GENERAL
PAINLEVEL_OUTOF10: 0

## 2020-09-24 NOTE — TELEPHONE ENCOUNTER
Pt's daughter called saying pt has been admitted to the hospital for Willie Gaffney. Pt saw Abena Mail 9/22/20.

## 2020-09-24 NOTE — PROGRESS NOTES
Comprehensive Nutrition Assessment    Type and Reason for Visit:  Initial, Positive Nutrition Screen    Nutrition Recommendations/Plan: at present send more Clear liquid items on tray d/t nausea    Nutrition Assessment:  Pt appears well nourished AEB fat and muscle mass. Is at risk for nutritional compromise d/t decreased po intake PTA, continued c/o nausea (not as bad right now) and 6.2% weight decrease in 3 months    Malnutrition Assessment:  Malnutrition Status:  No malnutrition    Context:  Acute Illness     Findings of the 6 clinical characteristics of malnutrition:  Energy Intake:  1 - 75% or less of estimated energy requirements for 7 or more days  Weight Loss:  (6.2% weight loss in 3 months)     Body Fat Loss:  No significant body fat loss     Muscle Mass Loss:  No significant muscle mass loss    Fluid Accumulation:  1 - Mild Extremities   Strength:  Not Performed    Estimated Daily Nutrient Needs:  Energy (kcal):  5427-6657 kcals (11-14 kcals/kg);  Weight Used for Energy Requirements:  Current     Protein (g):  70-118g; Weight Used for Protein Requirements:     Fluid (ml/day):  1100-2500ml; Weight Used for Fluid Requirements:  Current      Nutrition Related Findings:  well nourished      Wounds:  None       Current Nutrition Therapies:    DIET CARB CONTROL; Safety Tray; Safety Tray (Disposables)    Anthropometric Measures:  · Height: 5' 6\" (167.6 cm)  · Current Body Weight: 220 lb 6 oz (100 kg)   · Admission Body Weight: 222 lb 14 oz (101.1 kg)(bed)    · Usual Body Weight: 235 lb 14 oz (107 kg)(6/2020)     · Ideal Body Weight: 130 lbs; % Ideal Body Weight 169.5 %   · BMI: 35.6  · Adjusted Body Weight:  ; No Adjustment    · BMI Categories: Obese Class 2 (BMI 35.0 -39.9)       Nutrition Diagnosis:   · Inadequate oral intake, Inadequate protein-energy intake related to acute injury/trauma, impaired respiratory function as evidenced by intake 0-25%, weight loss, poor intake prior to admission,

## 2020-09-24 NOTE — PROGRESS NOTES
Wilder Harris arrived to room # [id number]. Presented with: Covid (+), weakness, SOA, Nausea  Mental Status: Patient is alert, coherent, logical, thought processes intact and able to concentrate and follow conversation. Vitals:    09/23/20 2300   BP: (!) 161/82   Pulse: 61   Resp: 21   Temp:    SpO2: 94%     Patient safety contract and falls prevention contract reviewed with patient Yes. Oriented Patient to room. Call light within reach. Yes.   Needs, issues or concerns expressed at this time: no.      Electronically signed by Ancelmo Holley RN on 9/24/2020 at 12:48 AM

## 2020-09-24 NOTE — PROGRESS NOTES
[place], [place], [address]        DEPARTMENT OF HOSPITALIST MEDICINE        PROGRESS  NOTE:    NOTE: Portions of this note have been copied forward, however, changed to reflect the most current clinical status of this patient. Patient: Marisabel Das  YOB: 1950  Date of Service: 9/24/2020  MRN: [id number]   Acct: [de-identified]   Primary Care Physician: Estephanie Magallon MD  Advance Directive: Full Code  Admit Date: 9/23/2020       Hospital Day: 1      CHIEF COMPLAINT:  Chief Complaint   Patient presents with    Fatigue     decreased PO intake, has not taken home medications in over 2 weeks    Flank Pain     left sided       [address]:    9/24/2020  Patient feels tired and fatigued. Not using any oxygen. She is holding her O2 saturations and dexamethasone was discontinued. We could consider patient discharged if she remains stable over 24 hours. 9/23/2020  HISTORY OF PRESENT ILLNESS:  69year old black female presents to the emergency department with complaint of generalized weakness and fatigue since fall from bed 3 weeks ago. She was seen at her primary care provider's office yesterday and underwent imaging, was directed to come to the hospital after review. She denies any significant shortness of breath, fever, chills, or cough above her baseline (Restrictive lung disease). She does have a coworker at [employer] who tested positive for COVID-19. Patient is unsure why she fell initially and states she was well prior, but was too weak to get off the floor. She is maintaining saturation on room air. No treatment prior to arrival aside from home medications. Patient is fatigued and forgetful, is a poor historian.       REVIEW  OF  SYSTEMS:    Constitutional:  No fevers, chills, nausea, vomiting, + tiredness and fatigue   Lungs:   No hemoptysis, pleurisy, + SOB   Heart:  No chest pressure with exertion, no palpitations,    Abdomen:   No new masses, no bright red blood negative    COLONOSCOPY  2/22/10    Dr Micheline Seth  4/1/16    Dr LAKHWINDER Horvath-internal hemorrhoids, 5 yr recall    EYE SURGERY      Cyst on Right eye    EYE SURGERY      GASTRIC BYPASS SURGERY      GASTRIC BYPASS SURGERY      HERNIA REPAIR      HYSTERECTOMY      Complete    HYSTERECTOMY      Partial - because had a tubal pregnancy.      INCONTINENCE SURGERY      Bladder Sling    OTHER SURGICAL HISTORY      IVC filter    PACEMAKER INSERTION      PACEMAKER PLACEMENT      medtronic    [place] TOTAL KNEE ARTHROPLASTY Right 3/26/2018    TOTAL KNEE REPLACEMENT COMPLEX PRIMARY performed by Donald Menard MD at [address] [facility] SPLENECTOMY      KRISTEL AND BSO      TONSILLECTOMY AND ADENOIDECTOMY      UPPER GASTROINTESTINAL ENDOSCOPY  12/2011    gerd s/p gastric bypass    UPPER GASTROINTESTINAL ENDOSCOPY  2/2014    normal s/p gastric bypass    UPPER GASTROINTESTINAL ENDOSCOPY  2/2010    biopsy neg Barretts, chronic reflux esophagitis s/p gastric bypass    UPPER GASTROINTESTINAL ENDOSCOPY  7/2006    unremarkable s/p gastric bypass    UPPER GASTROINTESTINAL ENDOSCOPY  8/10/15    Dr Marrero Mass UPPER GASTROINTESTINAL ENDOSCOPY  4/1/16    Dr Jeb Calero    UPPER GASTROINTESTINAL ENDOSCOPY N/A 4/1/2016    EGD ESOPHAGOGASTRODUODENOSCOPY performed by Elizabeth Khan MD at [facility]    [facility] Right 2003        FAMILY HISTORY:  Family History   Problem Relation Age of Onset    Uterine Cancer Mother     Cervical Cancer Mother     Coronary Art Dis Mother     Heart Disease Father     Lung Cancer Father     Other Father         renal failure    Colon Cancer Brother     Colon Polyps Brother     Uterine Cancer Maternal Grandmother     Cervical Cancer Maternal Grandmother     Diabetes Maternal Grandmother     Cervical Cancer Sister     Other Sister         fibromyalgia    Diabetes Paternal Aunt     Esophageal Cancer Neg Hx     Liver Cancer Neg Hx     Liver Disease Neg Hx     Stomach Cancer Neg Hx     Rectal Cancer Neg Hx            OBJECTIVE:  /72   Pulse 63   Temp 97.9 °F (36.6 °C) (Axillary)   Resp 24   Ht 5' 6\" (1.676 m)   Wt 220 lb 6.4 oz (100 kg)   SpO2 93%   BMI 35.57 kg/m²   No intake/output data recorded. PHYSICAL  EXAMINATION (done remotely to preserve PPE and due to COVID-19) . ..  Captured in coordination with the floor nurse:     KATHRYN:  Awake, alert, oriented x 3, patient appears tired and fatigued   Head/Eyes:  Normocephalic, atraumatic, EOMI and PERRLA bilaterally   Respiratory:   Bilateral decreased air entry in both lung fields, mild B/L crackles, symmetric expansion of chest (As per floor nurse)   Cardiovascular:  Regular rate and rhythm, S1+S2+0 (As per floor nurse)   Abdomen:   Non-distended   Extremities: Moves all, decreased range of motion, no edema   Neurologic: Awake, alert, oriented x 3, cranial nerves II-XII intact   Psychiatric: Normal mood, non-suicidal           CURRENT MEDICATIONS:  Scheduled:   bumetanide  2 mg Oral Daily    escitalopram  20 mg Oral Daily    gabapentin  100 mg Oral TID    levothyroxine  100 mcg Oral Daily    lisinopril  40 mg Oral Daily    therapeutic multivitamin-minerals  1 tablet Oral Daily    pantoprazole  40 mg Oral BID AC    potassium chloride  10 mEq Oral Daily    sodium chloride flush  10 mL Intravenous 2 times per day    levofloxacin  750 mg Intravenous Q24H    enoxaparin  1 mg/kg Subcutaneous BID    albuterol sulfate HFA  2 puff Inhalation 4x daily    And    ipratropium  2 puff Inhalation 4x daily    insulin lispro  0-12 Units Subcutaneous TID WC    insulin lispro  0-6 Units Subcutaneous Nightly    ferrous gluconate  324 mg Oral BID        PRN:  baclofen, 10 mg, TID PRN  cloNIDine, 0.1 mg, TID PRN  sodium chloride flush, 10 mL, PRN  acetaminophen, 650 mg, Q6H PRN    Or  acetaminophen, 650 mg, Q6H PRN  polyethylene glycol, 17 g, Daily PRN  promethazine, 12.5 mg, Q6H PRN    Or  ondansetron, 4 mg, Q6H PRN  glucose, 15 g, PRN  dextrose, 12.5 g, PRN  glucagon (rDNA), 1 mg, PRN  dextrose, 100 mL/hr, PRN        Infusions:   dextrose         Laboratory Data:  Recent Labs     09/23/20  1435 09/24/20  0248   WBC 9.8 8.0   HGB 12.0 11.4*    221     Recent Labs     09/23/20  1435 09/23/20  1720 09/23/20  1733 09/24/20  0248     --   --  136   K 4.0 3.7 3.7 4.2     --   --  104   CO2 22  --   --  17*   BUN 18  --   --  18   CREATININE 1.2*  --   --  1.1*   GLUCOSE 112*  --   --  141*     Recent Labs     09/23/20  1435 09/24/20  0248   AST 56* 64*   ALT 22 24   BILITOT 1.0 0.6   ALKPHOS 66 63     Troponin T:   Recent Labs     09/23/20  1435   TROPONINI <0.01     Pro-BNP: No results for input(s): BNP in the last 72 hours. INR:   Recent Labs     09/23/20  1435 09/24/20  0248   INR 1.11 1.18     UA:  Recent Labs     09/23/20  1746   COLORU YELLOW   PHUR 6.5   WBCUA 1   RBCUA 1   BACTERIA TRACE*   CLARITYU Clear   SPECGRAV 1.028   LEUKOCYTESUR Negative   UROBILINOGEN 1.0   BILIRUBINUR Negative   BLOODU Negative   GLUCOSEU Negative     A1C: No results for input(s): LABA1C in the last 72 hours. ABG:  Recent Labs     09/23/20  1733   PHART 7.460*   TAR8KWO 27.0*   PO2ART 60.0*   IKO4JYS 19.2*   BEART -3.5*   HGBAE 11.2*   R2DEMOEY 92.2   CARBOXHGBART 1.4         Imaging:    Ct Head Wo Contrast    Result Date: 9/23/2020  1. No acute intracranial process. Signed by Dr Manjit Luu on 9/23/2020 4:11 PM    Ct Cervical Spine Wo Contrast    Result Date: 9/23/2020  1. No evidence of acute osseous injury in the cervical spine. Signed by Dr Manjit Luu on 9/23/2020 4:09 PM    Ct Thoracic Spine Wo Contrast    Result Date: 9/23/2020  1. No evidence of acute osseous injury in the thoracic spine. 2. Bilateral multifocal lung infiltrates, most notably in the lung bases.  Upper lobe infiltrates are rounded and groundglass, which raises suspicion for atypical pneumonia such as Covid 23. However, the bilateral lung bases appear more consolidated with earlier bronchograms. I would include pulmonary edema in the differential as well given the extent of the consolidation. The results of this exam were discussed with Dr. Brady Sun on 9/23/2020 at 1617 hours Signed by Dr Maria Del Carmen Bhagat on 9/23/2020 4:17 PM    Ct Lumbar Spine Wo Contrast    Result Date: 9/23/2020  1. No evidence of acute injury in the osseous lumbar spine. Signed by Dr Maria Del Carmen Bhagat on 9/23/2020 4:19 PM    Ct Abdomen Pelvis W Iv Contrast Additional Contrast? None    Result Date: 9/23/2020  1. No acute process in the abdomen or pelvis. 2. Bibasilar consolidative lung infiltrates which appear to be conforming to more deep tendon distribution. Pulmonary edema certainly a consideration. Atypical pneumonia is also a consideration. 3. No evidence of bowel obstruction or viscus perforation. No peritoneal abscess. 4. No urolithiasis and/or hydronephrosis. 5. Prior gastric bypass. 6. Prior ventral hernia repair. No recurrent hernia. 7. IVC filter. Signed by Dr Maria Del Carmen Bhagat on 9/23/2020 4:26 PM    Xr Chest Portable    Result Date: 9/23/2020  1. Shallow inspiration with mild bibasilar atelectasis. 2. Underlying cardiomegaly. No interstitial or jose carlos pulmonary edema. Signed by Dr Maria Del Carmen Bhagat on 9/23/2020 2:59 PM       ASSESSMENT & PLAN:    Principal Problem:    COVID-19  Active Problems:    Iron deficiency anemia    Restrictive airway disease    DVT, lower extremity, proximal, acute, unspecified laterality ([address]    Type II diabetes mellitus with nephropathy ([address]    Generalized weakness    Accidental fall from bed    Palliative care patient  Resolved Problems:    * No resolved hospital problems.  *      Continue with current medications  Continue with COVID-19 aspiratory plus precautions  Patient does not require any oxygen hence we will DC dexamethasone  Continue diabetic meds  Accu-Cheks qAC and qHS ordered with low-dose insulin coverage as per protocol  Continue with the p.o. Levaquin started yesterday  Continue with p.o. diuresis  Strict I's and O's  DC planning if she remains stable over the next 24 hours      Repeat labs in a.m. Electrolyte replacement as per protocol. Patient will be monitored very closely on the floor. Further recommendations as per the hospital course. Discharge Plan:    Case management consult for safe DC planning of the patient with appropriate self-quarantine at home to ensure patient does not become a cause for community spread of COVID-19. Patient needs to be established with health department prior to discharge. Patient  is on DVT prophylaxis  Current medications reviewed  Lab work reviewed  Radiology/Chest x-ray films reviewed  Treatment recommendations from suspecialities reviewed, appreciated and agreed with  Discussed with the nurse and addressed all questions/concerns      Caleb Guerrero MD  9/24/2020 5:13 PM      DISCLAIMER: This note was created with electronic voice recognition which does have occasional errors. If you have any questions regarding the content within the note please do not hesitate to contact me. .. Thanks.

## 2020-09-24 NOTE — ACP (ADVANCE CARE PLANNING)
Advance Care Planning     Advance Care Planning Activator (Inpatient)  Conversation Note      Date of ACP Conversation: 9/24/20    Conversation Conducted with: Pt Morro whatley and primary decision maker. ACP Activator: [id number] Decision Maker:   Primary HCSchacorta Garfield Graver [phone]    Care Preferences  Ventilation: \"If you were in your present state of health and suddenly became very ill and were unable to breathe on your own, what would your preference be about the use of a ventilator (breathing machine) if it were available to you? \"      Would the patient desire the use of ventilator (breathing machine)?: Yes      Resuscitation  \"In the event your heart stopped as a result of an underlying serious health condition, would you want attempts to be made to restart your heart (answer \"yes\" for attempt to resuscitate) or would you prefer a natural death (answer \"no\" for do not attempt to resuscitate)? \" Yes      minutes:      Conversation Outcomes:  [x] ACP discussion completed  [] Existing advance directive reviewed with patient; no changes to patient's previously recorded wishes  [] New Advance Directive completed  [] Portable Do Not Rescitate prepared for Provider review and signature  [] POLST/POST/MOLST/MOST prepared for Provider review and signature      Follow-up plan:    [] Schedule follow-up conversation to continue planning  [] Referred individual to Provider for additional questions/concerns   [] Advised patient/agent/surrogate to review completed ACP document and update if needed with changes in condition, patient preferences or care setting    [] This note routed to one or more involved healthcare providers

## 2020-09-24 NOTE — CARE COORDINATION
Pt can receive Eliquis at dc using 30day supply free coupon from [facility] or [facility] meds to beds if pt is agreeable. Pt tested positive yesterday 9/23 and all [facility] SNFs accepting covid positive pt's require a 10 day minimum post covid results prior to admit to the facility. Also [facility] will discuss further with pt is agreeable to SNF placement upon dc.

## 2020-09-24 NOTE — CONSULTS
Palliative Care:  Pt is new to palliative care team.  PC nurse called to unit and spoke with pt nurse, Roberta Gonzales for brief update. Pt is currently asleep. PC RN made call to pt dtr, Nikki Carey to obtain information.         Past Medical History:        Past Medical History:   Diagnosis Date    Abdominal pain     Abnormal EKG     Acute sinusitis     Allergic reaction to spider bite     Anemia     Anticoagulated     Anxiety     Arrhythmia     Asthmatic bronchitis without complication 0/48/9531    Ataxic gait     Lyons's esophagus     Bowel obstruction (HCC)     Callus     Cardiac pacemaker     CKD (chronic kidney disease) stage 2, GFR 60-89 ml/min     Coat's syndrome     Coat's syndrome     both eyes    Depression     Diabetes mellitus type 2 in nonobese (HCC)     Diabetic nephropathy (HCC)     Disequilibrium     Dizziness     DVT (deep venous thrombosis) (HCC)     Exudative retinopathy     Fibromyalgia     Fibromyositis     Gastric ulcer     GERD (gastroesophageal reflux disease)     History of gastric bypass     Hx of lupus anticoagulant disorder     Hypertension     Hypothyroidism     Intermittent claudication (HCC)     Intestinal obstruction (HCC)     Iron deficiency     Left-sided weakness     Low vitamin D level     Lupus (HCC)     Menopause     Obesity     Osteoarthritis     Osteoporosis     Other iron deficiency anemias     Palliative care patient 09/24/2020    Pernicious anemia     PUPP (pruritic urticarial papules and plaques of pregnancy)     Right leg numbness     Right sided sciatica     Sarcoidosis     with liver involvement    Sciatica     Secondary hyperparathyroidism ([address]     Shingles     Shortness of breath     Sleep apnea     Stomach ulcer     Syncope     Type II diabetes mellitus with nephropathy (HCC)     Visual loss, one eye        Advance Directives:    Full Code  ACP completed with dtr     Pain/Other Symptoms:  Fatigue and SOA with exwertion     Activity:   As brisa          Psychological/Spiritual:      Good family and spiritual support. Plan:   Med management     Patient/family discussion r/t goals:  Pt is still working full time at [employer]. Dtr reports pt was off from March until august d/t pandemic. She just recently learned there was a positive COVID at her mother's place of employment. Dtr reports that pt has been active with no difficulties wither ADL's until about 3 weeks ago when pt fell from her bed. Pt saw her PCP last week and was encouraged to come to ED. Pt was found to be pos for COVID as well as PNU. Pt nurse reports pt is fatigued and SOA with exertion. Per dtr plan will be for pt to return to dtr's home where she lives. Palliative will follow for support/goals.         Review of any needed services:              Electronically signed by Adams Guzman RN on 9/24/2020 at 11:36 AM

## 2020-09-24 NOTE — CARE COORDINATION
SW attempted to call pt twice about a possible SNF placement, no answer. SW called the nurse and there is no phone in her room.

## 2020-09-25 ENCOUNTER — TELEPHONE (OUTPATIENT)
Dept: INTERNAL MEDICINE CLINIC | Age: 70
End: 2020-09-25

## 2020-09-25 LAB
ANION GAP SERPL CALCULATED.3IONS-SCNC: 14 MMOL/L (ref 7–19)
APTT: 55.8 SEC (ref 26–36.2)
BASOPHILS ABSOLUTE: 0 K/UL (ref 0–0.2)
BASOPHILS RELATIVE PERCENT: 0 % (ref 0–1)
BUN BLDV-MCNC: 22 MG/DL (ref 8–23)
CALCIUM SERPL-MCNC: 8.3 MG/DL (ref 8.8–10.2)
CHLORIDE BLD-SCNC: 97 MMOL/L (ref 98–111)
CO2: 27 MMOL/L (ref 22–29)
CREAT SERPL-MCNC: 0.5 MG/DL (ref 0.5–0.9)
D DIMER: 2.43 UG/ML FEU (ref 0–0.48)
EOSINOPHILS ABSOLUTE: 0 K/UL (ref 0–0.6)
EOSINOPHILS RELATIVE PERCENT: 0 % (ref 0–5)
FIBRINOGEN: 315 MG/DL (ref 238–505)
GFR AFRICAN AMERICAN: >59
GFR NON-AFRICAN AMERICAN: >60
GLUCOSE BLD-MCNC: 110 MG/DL (ref 70–99)
GLUCOSE BLD-MCNC: 80 MG/DL (ref 70–99)
GLUCOSE BLD-MCNC: 90 MG/DL (ref 74–109)
HCT VFR BLD CALC: 33.9 % (ref 37–47)
HEMOGLOBIN: 10.9 G/DL (ref 12–16)
IMMATURE GRANULOCYTES #: 0.1 K/UL
INR BLD: 1.46 (ref 0.88–1.18)
LYMPHOCYTES ABSOLUTE: 0.4 K/UL (ref 1.1–4.5)
LYMPHOCYTES RELATIVE PERCENT: 6.4 % (ref 20–40)
MAGNESIUM: 2.1 MG/DL (ref 1.6–2.4)
MCH RBC QN AUTO: 32.6 PG (ref 27–31)
MCHC RBC AUTO-ENTMCNC: 32.2 G/DL (ref 33–37)
MCV RBC AUTO: 101.5 FL (ref 81–99)
MONOCYTES ABSOLUTE: 0.4 K/UL (ref 0–0.9)
MONOCYTES RELATIVE PERCENT: 7.2 % (ref 0–10)
NEUTROPHILS ABSOLUTE: 5.1 K/UL (ref 1.5–7.5)
NEUTROPHILS RELATIVE PERCENT: 85.1 % (ref 50–65)
PDW BLD-RTO: 15 % (ref 11.5–14.5)
PERFORMED ON: ABNORMAL
PERFORMED ON: NORMAL
PHOSPHORUS: 2.5 MG/DL (ref 2.5–4.5)
PLATELET # BLD: 252 K/UL (ref 130–400)
PMV BLD AUTO: 9.4 FL (ref 9.4–12.3)
POTASSIUM SERPL-SCNC: 3.4 MMOL/L (ref 3.5–5)
PROTHROMBIN TIME: 17.8 SEC (ref 12–14.6)
RBC # BLD: 3.34 M/UL (ref 4.2–5.4)
SODIUM BLD-SCNC: 138 MMOL/L (ref 136–145)
WBC # BLD: 6 K/UL (ref 4.8–10.8)

## 2020-09-25 PROCEDURE — 83735 ASSAY OF MAGNESIUM: CPT

## 2020-09-25 PROCEDURE — 85025 COMPLETE CBC W/AUTO DIFF WBC: CPT

## 2020-09-25 PROCEDURE — 85384 FIBRINOGEN ACTIVITY: CPT

## 2020-09-25 PROCEDURE — 85379 FIBRIN DEGRADATION QUANT: CPT

## 2020-09-25 PROCEDURE — 6370000000 HC RX 637 (ALT 250 FOR IP): Performed by: HOSPITALIST

## 2020-09-25 PROCEDURE — 85730 THROMBOPLASTIN TIME PARTIAL: CPT

## 2020-09-25 PROCEDURE — 2100000000 HC CCU R&B

## 2020-09-25 PROCEDURE — 80048 BASIC METABOLIC PNL TOTAL CA: CPT

## 2020-09-25 PROCEDURE — 82947 ASSAY GLUCOSE BLOOD QUANT: CPT

## 2020-09-25 PROCEDURE — 6370000000 HC RX 637 (ALT 250 FOR IP): Performed by: FAMILY MEDICINE

## 2020-09-25 PROCEDURE — 2580000003 HC RX 258: Performed by: FAMILY MEDICINE

## 2020-09-25 PROCEDURE — 6360000002 HC RX W HCPCS: Performed by: FAMILY MEDICINE

## 2020-09-25 PROCEDURE — 85610 PROTHROMBIN TIME: CPT

## 2020-09-25 PROCEDURE — 84100 ASSAY OF PHOSPHORUS: CPT

## 2020-09-25 RX ORDER — POTASSIUM CHLORIDE 7.45 MG/ML
10 INJECTION INTRAVENOUS PRN
Status: DISCONTINUED | OUTPATIENT
Start: 2020-09-25 | End: 2020-09-30 | Stop reason: HOSPADM

## 2020-09-25 RX ORDER — POTASSIUM CHLORIDE 20 MEQ/1
40 TABLET, EXTENDED RELEASE ORAL ONCE
Status: COMPLETED | OUTPATIENT
Start: 2020-09-25 | End: 2020-09-25

## 2020-09-25 RX ORDER — MAGNESIUM SULFATE 1 G/100ML
1 INJECTION INTRAVENOUS PRN
Status: DISCONTINUED | OUTPATIENT
Start: 2020-09-25 | End: 2020-09-30 | Stop reason: HOSPADM

## 2020-09-25 RX ORDER — POTASSIUM CHLORIDE 20 MEQ/1
40 TABLET, EXTENDED RELEASE ORAL PRN
Status: DISCONTINUED | OUTPATIENT
Start: 2020-09-25 | End: 2020-09-30 | Stop reason: HOSPADM

## 2020-09-25 RX ORDER — WARFARIN SODIUM 5 MG/1
10 TABLET ORAL
Status: COMPLETED | OUTPATIENT
Start: 2020-09-25 | End: 2020-09-25

## 2020-09-25 RX ADMIN — ALBUTEROL SULFATE 2 PUFF: 90 AEROSOL, METERED RESPIRATORY (INHALATION) at 19:39

## 2020-09-25 RX ADMIN — WARFARIN SODIUM 10 MG: 5 TABLET ORAL at 17:54

## 2020-09-25 RX ADMIN — ENOXAPARIN SODIUM 100 MG: 100 INJECTION SUBCUTANEOUS at 09:45

## 2020-09-25 RX ADMIN — BUMETANIDE 2 MG: 1 TABLET ORAL at 09:48

## 2020-09-25 RX ADMIN — FERROUS GLUCONATE TAB 324 MG (37.5 MG ELEMENTAL IRON) 324 MG: 324 (37.5 FE) TAB at 19:40

## 2020-09-25 RX ADMIN — Medication 10 ML: at 09:49

## 2020-09-25 RX ADMIN — IPRATROPIUM BROMIDE 2 PUFF: 17 AEROSOL, METERED RESPIRATORY (INHALATION) at 15:23

## 2020-09-25 RX ADMIN — PANTOPRAZOLE SODIUM 40 MG: 40 TABLET, DELAYED RELEASE ORAL at 06:08

## 2020-09-25 RX ADMIN — PANTOPRAZOLE SODIUM 40 MG: 40 TABLET, DELAYED RELEASE ORAL at 15:23

## 2020-09-25 RX ADMIN — IPRATROPIUM BROMIDE 2 PUFF: 17 AEROSOL, METERED RESPIRATORY (INHALATION) at 19:39

## 2020-09-25 RX ADMIN — Medication 10 ML: at 19:40

## 2020-09-25 RX ADMIN — FERROUS GLUCONATE TAB 324 MG (37.5 MG ELEMENTAL IRON) 324 MG: 324 (37.5 FE) TAB at 09:48

## 2020-09-25 RX ADMIN — GABAPENTIN 100 MG: 100 CAPSULE ORAL at 15:23

## 2020-09-25 RX ADMIN — ALBUTEROL SULFATE 2 PUFF: 90 AEROSOL, METERED RESPIRATORY (INHALATION) at 15:23

## 2020-09-25 RX ADMIN — GABAPENTIN 100 MG: 100 CAPSULE ORAL at 19:40

## 2020-09-25 RX ADMIN — LISINOPRIL 40 MG: 20 TABLET ORAL at 09:49

## 2020-09-25 RX ADMIN — LEVOTHYROXINE SODIUM 100 MCG: 100 TABLET ORAL at 06:08

## 2020-09-25 RX ADMIN — ESCITALOPRAM OXALATE 20 MG: 10 TABLET ORAL at 09:48

## 2020-09-25 RX ADMIN — MULTIPLE VITAMINS W/ MINERALS TAB 1 TABLET: TAB at 09:48

## 2020-09-25 RX ADMIN — POTASSIUM CHLORIDE 40 MEQ: 1500 TABLET, EXTENDED RELEASE ORAL at 11:28

## 2020-09-25 RX ADMIN — ENOXAPARIN SODIUM 100 MG: 100 INJECTION SUBCUTANEOUS at 19:41

## 2020-09-25 RX ADMIN — POTASSIUM CHLORIDE 40 MEQ: 1500 TABLET, EXTENDED RELEASE ORAL at 06:08

## 2020-09-25 RX ADMIN — GABAPENTIN 100 MG: 100 CAPSULE ORAL at 09:48

## 2020-09-25 RX ADMIN — LEVOFLOXACIN 750 MG: 5 INJECTION, SOLUTION INTRAVENOUS at 19:59

## 2020-09-25 ASSESSMENT — PAIN SCALES - GENERAL
PAINLEVEL_OUTOF10: 0

## 2020-09-25 NOTE — CARE COORDINATION
Spoke with patient's nurse, Itzel Rodney, to see if I could speak with patient by phone due to COVID restrictions. He attempted to forward my call but was able to connect. And patient does not have cell phone. I explained to Itzel Rodney what I needed and to give aptient a choice. Itzel Rodney relayed the information regarding MD orders for [facility] services. Patient agreeable and has chosen [facility]. Referral Faxed. [address] [place] [address] [phone]. [phone]. Please notify [address] [place] [address] when patient discharges and fax DC Summary,  DC med list and any new [facility] orders. The Patient  was provided with a choice of provider by Itzel Rodney and agrees with the discharge plan. [x] Yes [] No    Freedom of choice list was provided with basic dialogue that supports the patient's individualized plan of care/goals, treatment preferences and shares the quality data associated with the providers.  [x] Yes [] No  Electronically signed by Lee Ann Howard RN on 9/25/2020 at 2:55 PM

## 2020-09-25 NOTE — PROGRESS NOTES
Comprehensive Nutrition Assessment    Type and Reason for Visit:  Reassess    Nutrition Recommendations/Plan: continue current POC    Nutrition Assessment:  Remains well nourished and is improving from a nutritional standpoint d/t decreased nausea, increasing po intake    Malnutrition Assessment:  Malnutrition Status:  No malnutrition    Context:  Acute Illness     Findings of the 6 clinical characteristics of malnutrition:  Energy Intake:  7 - 50% or less of estimated energy requirements for 5 or more days  Weight Loss:  No significant weight loss     Body Fat Loss:  No significant body fat loss     Muscle Mass Loss:  No significant muscle mass loss    Fluid Accumulation:  1 - Mild Extremities   Strength:  Not Performed    Estimated Daily Nutrient Needs:  Energy (kcal):  6147-3848 kcals (11-14 kcals/kg);  Weight Used for Energy Requirements:  Current     Protein (g):  70-118g; Weight Used for Protein Requirements:  Ideal        Fluid (ml/day):  1100-2500ml; Weight Used for Fluid Requirements:  Current      Nutrition Related Findings:  well nourished      Wounds:  None       Current Nutrition Therapies:    DIET CARB CONTROL; Safety Tray; Safety Tray (Disposables)    Anthropometric Measures:  · Height: 5' 6\" (167.6 cm)  · Current Body Weight: 222 lb 6 oz (100.9 kg)   · Admission Body Weight: 222 lb 14 oz (101.1 kg)(bed)    · Usual Body Weight: 235 lb 14 oz (107 kg)(6/2020)     · Ideal Body Weight: 130 lbs; % Ideal Body Weight 169.5 %   · BMI: 35.9  · Adjusted Body Weight:  ; No Adjustment   · BMI Categories: Obese Class 2 (BMI 35.0 -39.9)       Nutrition Diagnosis:   · Inadequate oral intake, Inadequate protein-energy intake related to acute injury/trauma, impaired respiratory function as evidenced by intake 0-25%, intake 26-50%, poor intake prior to admission      Nutrition Interventions:   Food and/or Nutrient Delivery:  Continue Current Diet  Nutrition Education/Counseling:  No recommendation at this time Coordination of Nutrition Care:  Continued Inpatient Monitoring    Goals:  po intake 50% or greater. No c/o nausea.   Weight stable       Nutrition Monitoring and Evaluation:   Behavioral-Environmental Outcomes:      Food/Nutrient Intake Outcomes:  Food and Nutrient Intake  Physical Signs/Symptoms Outcomes:  Biochemical Data, Skin, Weight, Nausea or Vomiting     Discharge Planning:    Continue current diet     Electronically signed by Elise Martinez MS, RD, LD on 9/25/20 at 10:38 AM CDT    Contact: [phone]

## 2020-09-25 NOTE — TELEPHONE ENCOUNTER
[address] [address] has has orders for [place] for SN/PT/OT, due to Daron + by [facility]  hospitalist  Will you follow pt for [place]  ?

## 2020-09-25 NOTE — PROGRESS NOTES
Clinical Pharmacy Note    Tulio Vyas is a 69 y.o. female for whom pharmacy has been asked to manage warfarin therapy.      Reason for Admission: Covid/DVT    Consulting Physician: Dr Neyda Paiz  Warfarin dose prior to admission: 7.5 mg MWF; 15 mg SuTuThFrSat  Warfarin indication: DVT prophylaxis  Target INR range: >2   Outpatient warfarin provider: Chai Cunningham    Past Medical History:   Diagnosis Date    Abdominal pain     Abnormal EKG     Acute sinusitis     Allergic reaction to spider bite     Anemia     Anticoagulated     Anxiety     Arrhythmia     Asthmatic bronchitis without complication 7/39/7004    Ataxic gait     Lyons's esophagus     Bowel obstruction (HCC)     Callus     Cardiac pacemaker     CKD (chronic kidney disease) stage 2, GFR 60-89 ml/min     Coat's syndrome     Coat's syndrome     both eyes    Depression     Diabetes mellitus type 2 in nonobese (HCC)     Diabetic nephropathy (HCC)     Disequilibrium     Dizziness     DVT (deep venous thrombosis) (HCC)     Exudative retinopathy     Fibromyalgia     Fibromyositis     Gastric ulcer     GERD (gastroesophageal reflux disease)     History of gastric bypass     Hx of lupus anticoagulant disorder     Hypertension     Hypothyroidism     Intermittent claudication (HCC)     Intestinal obstruction (HCC)     Iron deficiency     Left-sided weakness     Low vitamin D level     Lupus (HCC)     Menopause     Obesity     Osteoarthritis     Osteoporosis     Other iron deficiency anemias     Palliative care patient 09/24/2020    Pernicious anemia     PUPP (pruritic urticarial papules and plaques of pregnancy)     Right leg numbness     Right sided sciatica     Sarcoidosis     with liver involvement    Sciatica     Secondary hyperparathyroidism ([address]     Shingles     Shortness of breath     Sleep apnea     Stomach ulcer     Syncope     Type II diabetes mellitus with nephropathy (HCC)     Visual loss, one eye                 Recent Labs     09/25/20  0245   INR 1.46* Recent Labs     09/23/20  1435 09/24/20  0248 09/25/20  0245   HGB 12.0 11.4* 10.9*   HCT 37.1 35.2* 33.9*    221 252       Current warfarin drug-drug interactions: Lexapro, Azithromycin        Date INR Warfarin Dose   09/25/20 1.46 10 mg                                   Give warfarin 10 mg PO x 1 dose today. Daily PT/INR until stable within therapeutic range. Thank you for the consult.      Electronically signed by Libra Rodriguez [facility] on 9/25/2020 at 2:07 PM

## 2020-09-25 NOTE — PROGRESS NOTES
Clinical Pharmacy Note    Naveen Rebollar is a 69 y.o. female for whom pharmacy has been asked to manage warfarin therapy.      Reason for Admission: Covid/DVT    Consulting Physician: Dr Naseem Newton  Warfarin dose prior to admission: 7.5 mg MWF; 15 mg SuTuThFrSat  Warfarin indication: DVT prophylaxis  Target INR range: >2   Outpatient warfarin provider: Wilbert Hayes    Past Medical History:   Diagnosis Date    Abdominal pain     Abnormal EKG     Acute sinusitis     Allergic reaction to spider bite     Anemia     Anticoagulated     Anxiety     Arrhythmia     Asthmatic bronchitis without complication 4/08/7546    Ataxic gait     Lyons's esophagus     Bowel obstruction (HCC)     Callus     Cardiac pacemaker     CKD (chronic kidney disease) stage 2, GFR 60-89 ml/min     Coat's syndrome     Coat's syndrome     both eyes    Depression     Diabetes mellitus type 2 in nonobese (HCC)     Diabetic nephropathy (HCC)     Disequilibrium     Dizziness     DVT (deep venous thrombosis) (HCC)     Exudative retinopathy     Fibromyalgia     Fibromyositis     Gastric ulcer     GERD (gastroesophageal reflux disease)     History of gastric bypass     Hx of lupus anticoagulant disorder     Hypertension     Hypothyroidism     Intermittent claudication (HCC)     Intestinal obstruction (HCC)     Iron deficiency     Left-sided weakness     Low vitamin D level     Lupus (HCC)     Menopause     Obesity     Osteoarthritis     Osteoporosis     Other iron deficiency anemias     Palliative care patient 09/24/2020    Pernicious anemia     PUPP (pruritic urticarial papules and plaques of pregnancy)     Right leg numbness     Right sided sciatica     Sarcoidosis     with liver involvement    Sciatica     Secondary hyperparathyroidism ([address]     Shingles     Shortness of breath     Sleep apnea     Stomach ulcer     Syncope     Type II diabetes mellitus with nephropathy (HCC)     Visual loss, one eye                 Recent Labs     09/25/20  0245   INR 1.46* Recent Labs     09/23/20  1435 09/24/20  0248 09/25/20  0245   HGB 12.0 11.4* 10.9*   HCT 37.1 35.2* 33.9*    221 252       Current warfarin drug-drug interactions: Lexapro, Azithromycin        Date INR Warfarin Dose   09/25/20 1.46 10 mg                                   Give warfarin 10 mg PO x 1 dose today. Daily PT/INR until stable within therapeutic range. Thank you for the consult.      Electronically signed by Yenifer Parks, [facility] on 9/25/2020 at 2:07 PM

## 2020-09-26 LAB
ACANTHOCYTES: ABNORMAL
ANION GAP SERPL CALCULATED.3IONS-SCNC: 15 MMOL/L (ref 7–19)
APTT: 31.9 SEC (ref 26–36.2)
ATYPICAL LYMPHOCYTE RELATIVE PERCENT: 1 % (ref 0–8)
BANDED NEUTROPHILS RELATIVE PERCENT: 2 % (ref 0–5)
BASOPHILS ABSOLUTE: 0 K/UL (ref 0–0.2)
BASOPHILS RELATIVE PERCENT: 0 % (ref 0–1)
BUN BLDV-MCNC: 35 MG/DL (ref 8–23)
BURR CELLS: ABNORMAL
CALCIUM SERPL-MCNC: 8.8 MG/DL (ref 8.8–10.2)
CHLORIDE BLD-SCNC: 104 MMOL/L (ref 98–111)
CO2: 20 MMOL/L (ref 22–29)
CREAT SERPL-MCNC: 1.6 MG/DL (ref 0.5–0.9)
D DIMER: 5.45 UG/ML FEU (ref 0–0.48)
EOSINOPHILS ABSOLUTE: 0 K/UL (ref 0–0.6)
EOSINOPHILS RELATIVE PERCENT: 0 % (ref 0–5)
FIBRINOGEN: 344 MG/DL (ref 238–505)
GFR AFRICAN AMERICAN: 38
GFR NON-AFRICAN AMERICAN: 32
GLUCOSE BLD-MCNC: 100 MG/DL (ref 70–99)
GLUCOSE BLD-MCNC: 109 MG/DL (ref 70–99)
GLUCOSE BLD-MCNC: 114 MG/DL (ref 70–99)
GLUCOSE BLD-MCNC: 116 MG/DL (ref 70–99)
GLUCOSE BLD-MCNC: 95 MG/DL (ref 74–109)
HCT VFR BLD CALC: 33.8 % (ref 37–47)
HEMOGLOBIN: 11.1 G/DL (ref 12–16)
IMMATURE GRANULOCYTES #: 0.5 K/UL
INR BLD: 1.19 (ref 0.88–1.18)
LYMPHOCYTES ABSOLUTE: 1.5 K/UL (ref 1.1–4.5)
LYMPHOCYTES RELATIVE PERCENT: 14 % (ref 20–40)
MCH RBC QN AUTO: 29.3 PG (ref 27–31)
MCHC RBC AUTO-ENTMCNC: 32.8 G/DL (ref 33–37)
MCV RBC AUTO: 89.2 FL (ref 81–99)
MONOCYTES ABSOLUTE: 1.2 K/UL (ref 0–0.9)
MONOCYTES RELATIVE PERCENT: 12 % (ref 0–10)
MYELOCYTE PERCENT: 1 %
NEUTROPHILS ABSOLUTE: 7.4 K/UL (ref 1.5–7.5)
NEUTROPHILS RELATIVE PERCENT: 70 % (ref 50–65)
PDW BLD-RTO: 14.9 % (ref 11.5–14.5)
PERFORMED ON: ABNORMAL
PLATELET # BLD: 258 K/UL (ref 130–400)
PLATELET SLIDE REVIEW: ADEQUATE
PMV BLD AUTO: 12.3 FL (ref 9.4–12.3)
POTASSIUM SERPL-SCNC: 4.7 MMOL/L (ref 3.5–5)
PROTHROMBIN TIME: 15.1 SEC (ref 12–14.6)
RBC # BLD: 3.79 M/UL (ref 4.2–5.4)
SODIUM BLD-SCNC: 139 MMOL/L (ref 136–145)
WBC # BLD: 10.2 K/UL (ref 4.8–10.8)

## 2020-09-26 PROCEDURE — 6370000000 HC RX 637 (ALT 250 FOR IP): Performed by: HOSPITALIST

## 2020-09-26 PROCEDURE — 85610 PROTHROMBIN TIME: CPT

## 2020-09-26 PROCEDURE — 97530 THERAPEUTIC ACTIVITIES: CPT

## 2020-09-26 PROCEDURE — 85730 THROMBOPLASTIN TIME PARTIAL: CPT

## 2020-09-26 PROCEDURE — 2580000003 HC RX 258: Performed by: FAMILY MEDICINE

## 2020-09-26 PROCEDURE — 2580000003 HC RX 258: Performed by: HOSPITALIST

## 2020-09-26 PROCEDURE — 82947 ASSAY GLUCOSE BLOOD QUANT: CPT

## 2020-09-26 PROCEDURE — 85379 FIBRIN DEGRADATION QUANT: CPT

## 2020-09-26 PROCEDURE — 85025 COMPLETE CBC W/AUTO DIFF WBC: CPT

## 2020-09-26 PROCEDURE — 97162 PT EVAL MOD COMPLEX 30 MIN: CPT

## 2020-09-26 PROCEDURE — 2100000000 HC CCU R&B

## 2020-09-26 PROCEDURE — 6370000000 HC RX 637 (ALT 250 FOR IP): Performed by: FAMILY MEDICINE

## 2020-09-26 PROCEDURE — 6360000002 HC RX W HCPCS: Performed by: FAMILY MEDICINE

## 2020-09-26 PROCEDURE — 85384 FIBRINOGEN ACTIVITY: CPT

## 2020-09-26 PROCEDURE — 80048 BASIC METABOLIC PNL TOTAL CA: CPT

## 2020-09-26 RX ORDER — LEVOFLOXACIN 5 MG/ML
750 INJECTION, SOLUTION INTRAVENOUS
Status: DISCONTINUED | OUTPATIENT
Start: 2020-09-27 | End: 2020-09-28

## 2020-09-26 RX ORDER — WARFARIN SODIUM 5 MG/1
15 TABLET ORAL
Status: COMPLETED | OUTPATIENT
Start: 2020-09-26 | End: 2020-09-26

## 2020-09-26 RX ORDER — SODIUM CHLORIDE 9 MG/ML
INJECTION, SOLUTION INTRAVENOUS CONTINUOUS
Status: ACTIVE | OUTPATIENT
Start: 2020-09-26 | End: 2020-09-26

## 2020-09-26 RX ADMIN — SODIUM CHLORIDE: 9 INJECTION, SOLUTION INTRAVENOUS at 09:04

## 2020-09-26 RX ADMIN — PANTOPRAZOLE SODIUM 40 MG: 40 TABLET, DELAYED RELEASE ORAL at 16:08

## 2020-09-26 RX ADMIN — IPRATROPIUM BROMIDE 2 PUFF: 17 AEROSOL, METERED RESPIRATORY (INHALATION) at 20:28

## 2020-09-26 RX ADMIN — Medication 10 ML: at 20:19

## 2020-09-26 RX ADMIN — GABAPENTIN 100 MG: 100 CAPSULE ORAL at 07:51

## 2020-09-26 RX ADMIN — PANTOPRAZOLE SODIUM 40 MG: 40 TABLET, DELAYED RELEASE ORAL at 07:12

## 2020-09-26 RX ADMIN — ALBUTEROL SULFATE 2 PUFF: 90 AEROSOL, METERED RESPIRATORY (INHALATION) at 12:28

## 2020-09-26 RX ADMIN — Medication 10 ML: at 07:50

## 2020-09-26 RX ADMIN — LEVOTHYROXINE SODIUM 100 MCG: 100 TABLET ORAL at 07:12

## 2020-09-26 RX ADMIN — BUMETANIDE 2 MG: 1 TABLET ORAL at 07:50

## 2020-09-26 RX ADMIN — MULTIPLE VITAMINS W/ MINERALS TAB 1 TABLET: TAB at 07:49

## 2020-09-26 RX ADMIN — ALBUTEROL SULFATE 2 PUFF: 90 AEROSOL, METERED RESPIRATORY (INHALATION) at 20:28

## 2020-09-26 RX ADMIN — FERROUS GLUCONATE TAB 324 MG (37.5 MG ELEMENTAL IRON) 324 MG: 324 (37.5 FE) TAB at 07:50

## 2020-09-26 RX ADMIN — WARFARIN SODIUM 15 MG: 5 TABLET ORAL at 17:04

## 2020-09-26 RX ADMIN — ALBUTEROL SULFATE 2 PUFF: 90 AEROSOL, METERED RESPIRATORY (INHALATION) at 16:09

## 2020-09-26 RX ADMIN — LISINOPRIL 40 MG: 20 TABLET ORAL at 07:50

## 2020-09-26 RX ADMIN — IPRATROPIUM BROMIDE 2 PUFF: 17 AEROSOL, METERED RESPIRATORY (INHALATION) at 12:28

## 2020-09-26 RX ADMIN — ENOXAPARIN SODIUM 100 MG: 100 INJECTION SUBCUTANEOUS at 07:49

## 2020-09-26 RX ADMIN — IPRATROPIUM BROMIDE 2 PUFF: 17 AEROSOL, METERED RESPIRATORY (INHALATION) at 16:09

## 2020-09-26 RX ADMIN — ALBUTEROL SULFATE 2 PUFF: 90 AEROSOL, METERED RESPIRATORY (INHALATION) at 08:17

## 2020-09-26 RX ADMIN — POTASSIUM CHLORIDE 10 MEQ: 10 TABLET, EXTENDED RELEASE ORAL at 07:50

## 2020-09-26 RX ADMIN — ESCITALOPRAM OXALATE 20 MG: 10 TABLET ORAL at 07:50

## 2020-09-26 RX ADMIN — GABAPENTIN 100 MG: 100 CAPSULE ORAL at 20:18

## 2020-09-26 RX ADMIN — FERROUS GLUCONATE TAB 324 MG (37.5 MG ELEMENTAL IRON) 324 MG: 324 (37.5 FE) TAB at 20:19

## 2020-09-26 RX ADMIN — ENOXAPARIN SODIUM 100 MG: 100 INJECTION SUBCUTANEOUS at 20:19

## 2020-09-26 RX ADMIN — GABAPENTIN 100 MG: 100 CAPSULE ORAL at 16:08

## 2020-09-26 RX ADMIN — IPRATROPIUM BROMIDE 2 PUFF: 17 AEROSOL, METERED RESPIRATORY (INHALATION) at 08:17

## 2020-09-26 ASSESSMENT — PAIN DESCRIPTION - ORIENTATION: ORIENTATION: LEFT

## 2020-09-26 ASSESSMENT — PAIN SCALES - GENERAL
PAINLEVEL_OUTOF10: 0
PAINLEVEL_OUTOF10: 0
PAINLEVEL_OUTOF10: 8
PAINLEVEL_OUTOF10: 0

## 2020-09-26 ASSESSMENT — PAIN - FUNCTIONAL ASSESSMENT: PAIN_FUNCTIONAL_ASSESSMENT: PREVENTS OR INTERFERES SOME ACTIVE ACTIVITIES AND ADLS

## 2020-09-26 ASSESSMENT — PAIN DESCRIPTION - LOCATION: LOCATION: KNEE

## 2020-09-26 ASSESSMENT — PAIN DESCRIPTION - PAIN TYPE: TYPE: ACUTE PAIN

## 2020-09-26 NOTE — PROGRESS NOTES
Physical Therapy    Facility/Department: [facility] CORONARY CARE UNIT  Initial Assessment    NAME: Arlene Grove  : 1950  MRN: [id number]    Date of Service: 2020    Discharge Recommendations:  Continue to assess pending progress, Patient would benefit from continued therapy after discharge, 24 hour supervision or assist        Assessment   Body structures, Functions, Activity limitations: Decreased functional mobility ; Decreased ROM; Decreased strength;Decreased sensation;Decreased endurance;Decreased balance; Increased pain;Decreased posture  Assessment: Pt. will benefit from PT to decrease impairments. Pt. a fall risk and should not attempt mobility on her own at this time. Anticipate pt. may need additional therapy after d/c from [facility] due to low endurance, pain in B knees and inability to tolerate more than a transfer at this time. Pt. in a very weaknened state and will most likely need 24 hr. care and RW upon d/c if she is able to d/c [place]. Treatment Diagnosis: impaired gait and mobility  Prognosis: Guarded  Decision Making: Medium Complexity  PT Education: Goals;PT Role;Plan of Care;General Safety;Transfer Training;Gait Training;Functional Mobility Training  Barriers to Learning: none noted  REQUIRES PT FOLLOW UP: Yes  Activity Tolerance  Activity Tolerance: Patient limited by endurance; Patient limited by fatigue;Patient limited by pain  Activity Tolerance: B knee pain L>R       Patient Diagnosis(es): The primary encounter diagnosis was Pneumonia due to COVID-19 virus. Diagnoses of Generalized weakness, Acute midline low back pain without sciatica, Flank pain, Nonintractable headache, unspecified chronicity pattern, unspecified headache type, and Closed head injury, initial encounter were also pertinent to this visit.      has a past medical history of Abdominal pain, Abnormal EKG, Acute sinusitis, Allergic reaction to spider bite, Anemia, Anticoagulated, Anxiety, Arrhythmia, Asthmatic bronchitis without complication, Ataxic gait, Lyons's esophagus, Bowel obstruction (HCC), Callus, Cardiac pacemaker, CKD (chronic kidney disease) stage 2, GFR 60-89 ml/min, Coat's syndrome, Coat's syndrome, Depression, Diabetes mellitus type 2 in nonobese ([address] Diabetic nephropathy ([address] Disequilibrium, Dizziness, DVT (deep venous thrombosis) ([address] Exudative retinopathy, Fibromyalgia, Fibromyositis, Gastric ulcer, GERD (gastroesophageal reflux disease), History of gastric bypass, Hx of lupus anticoagulant disorder, Hypertension, Hypothyroidism, Intermittent claudication ([address] Intestinal obstruction (HCC), Iron deficiency, Left-sided weakness, Low vitamin D level, Lupus ([address] Menopause, Obesity, Osteoarthritis, Osteoporosis, Other iron deficiency anemias, Palliative care patient, Pernicious anemia, PUPP (pruritic urticarial papules and plaques of pregnancy), Right leg numbness, Right sided sciatica, Sarcoidosis, Sciatica, Secondary hyperparathyroidism ([address] Shingles, Shortness of breath, Sleep apnea, Stomach ulcer, Syncope, Type II diabetes mellitus with nephropathy ([address] and Visual loss, one eye.   has a past surgical history that includes Pacemaker insertion; Gastric bypass surgery; chuy and bso (cervix removed); hernia repair; Cholecystectomy; Splenectomy; eye surgery; Colonoscopy (2/2010); Upper gastrointestinal endoscopy (12/2011); Appendectomy; Tonsillectomy and adenoidectomy; Cardiac catheterization (10/21/13  [facility]); Hysterectomy; Incontinence surgery; Hysterectomy; Upper gastrointestinal endoscopy (2/2014); Upper gastrointestinal endoscopy (2/2010); Upper gastrointestinal endoscopy (7/2006); Colonoscopy (2/22/10); Upper gastrointestinal endoscopy (8/10/15); Colonoscopy (4/1/16); Upper gastrointestinal endoscopy (4/1/16); Upper gastrointestinal endoscopy (N/A, 4/1/2016); other surgical history; Small intestine surgery; pacemaker placement; Vena Cava Filter Placement (Right, 2003);  Eye surgery; Gastric bypass surgery; Small intestine surgery; and pr total knee arthroplasty (Right, 3/26/2018). Restrictions  Restrictions/Precautions  Restrictions/Precautions: Fall Risk, Isolation  Position Activity Restriction  Other position/activity restrictions: droplet plus due to covid  Vision/Hearing  Vision: Impaired  Vision Exceptions: Wears glasses at all times(blind in R eye)  Hearing: Exceptions to [facility]  Hearing Exceptions: Hard of hearing/hearing concerns     Subjective  General  Chart Reviewed: Yes  Patient assessed for rehabilitation services?: Yes  Response To Previous Treatment: Not applicable  Family / Caregiver Present: No  Referring Practitioner: Hazel Metcalf DO  Referral Date : 09/23/20  Diagnosis: PNA due to Covid 19, generalized weakness, acute midline WBP without sciatica, flank pain, nonintractable headache, CHI  Follows Commands: Within Functional Limits  General Comment  Comments: RNSolange PT and states pt. has been getting to [facility] and back on her own. Subjective  Subjective: Pt. willing to get back to bed. States she is getting SOA with transfers only.   Pain Screening  Patient Currently in Pain: Yes  Pain Assessment  Pain Assessment: 0-10  Pain Level: 8  Pain Type: Acute pain  Pain Location: Knee  Pain Orientation: Left  Functional Pain Assessment: Prevents or interferes some active activities and ADLs  Vital Signs  BP: 139/82(after transfer to bed)  Patient Currently in Pain: Yes  Oxygen Therapy  SpO2: (!) 80 %(80% immediately after transfer and then 90% with rest)  O2 Device: None (Room air)  Pre Treatment Pain Screening  Intervention List: Patient able to continue with treatment    Orientation  Orientation  Overall Orientation Status: Within Functional Limits  Social/Functional History  Social/Functional History  Lives With: Daughter  Type of Home: House  Home Layout: One level  Home Access: Stairs to enter with rails  Entrance Stairs - Number of Steps: 3-4  Home Equipment: (none)  ADL Assistance: Independent  Ambulation Assistance: Independent(short distances only)  Transfer Assistance: Independent  Active : No  Patient's  Info: daughter  Occupation: Part time employment  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[address]  Overall Cognitive Status: WFL    Objective     Observation/Palpation  Observation: B knees with arthritic changes, IV, telemetry, O2 sat monitor  Body Mechanics: increased thoracic kyphosis, forward head    AROM RLE (degrees)  RLE AROM: Exceptions  RLE General AROM: knees and hips flex to 90 deg in sitting, ankles WFLs  AROM LLE (degrees)  LLE AROM : Exceptions  LLE General AROM: knees and hips flex to 90 deg in sitting, ankles WFLs  Strength RLE  Strength RLE: Exception  Comment: grossly 3-/5 knees and hips, ankles 3+/5  Strength LLE  Strength LLE: Exception  Comment: grossly 3-/5 knees and hips, ankles 3+/5     Sensation  Overall Sensation Status: Impaired(neuropathy B feet)  Bed mobility  Supine to Sit: Minimal assistance  Comment: pt. already up on BSC  Transfers  Sit to Stand: Minimal Assistance  Stand to sit: Minimal Assistance  Bed to Chair: Minimal assistance(BSC to bed)  Comment: pt became SOA with transfer BSC to bed, O2 sat 08% then increased to 90% with rest.  Ambulation  Ambulation?: No(pt. unable to tolerate more amb. than just a few steps from [facility] to bed)     Balance  Posture: Fair  Sitting - Static: Fair;+  Sitting - Dynamic: Fair;-  Standing - Static: Poor;+  Standing - Dynamic: Poor;+        Plan   Plan  Times per week: 3-7  Current Treatment Recommendations: Strengthening, ROM, Balance Training, Functional Mobility Training, Transfer Training, Endurance Training, Safety Education & Training, Positioning, Equipment Evaluation, Education, & procurement, Patient/Caregiver Education & Training, Gait Training  Plan Comment: cont. PT per POC.   Safety Devices  Type of devices: Gait belt, Patient at risk for falls, Nurse notified, Left in bed, Call light within reach    G-Code       OutComes Score                                                  AM-PAC Score             Goals  Short term goals  Time Frame for Short term goals: 2 wks  Short term goal 1: supine to sit indep  Short term goal 2: sit to stand indep  Short term goal 3: amb. 48' with RW SBA  Patient Goals   Patient goals : get stronger, breathe better       Therapy Time   Individual Concurrent Group Co-treatment   Time In           Time Out           Minutes                   Sierra Vargas PT    Electronically signed by Sierra Vargas PT on 9/26/2020 at 4:51 PM

## 2020-09-26 NOTE — PROGRESS NOTES
[place], [place], [address]        DEPARTMENT OF HOSPITALIST MEDICINE        PROGRESS  NOTE:    NOTE: Portions of this note have been copied forward, however, changed to reflect the most current clinical status of this patient. Patient: Zina Archer  YOB: 1950  Date of Service: 9/26/2020  MRN: [id number]   Acct: [de-identified]   Primary Care Physician: Elver Acosta MD  Advance Directive: Full Code  Admit Date: 9/23/2020       Hospital Day: 3      CHIEF COMPLAINT:  Chief Complaint   Patient presents with    Fatigue     decreased PO intake, has not taken home medications in over 2 weeks    Flank Pain     left sided       [address]:    9/26/2020  Patient is feeling weak and tired. Her INR is still subtherapeutic at 1.19. Her potassium was replaced and is normal at 4.7. Creatinine initially showed improvement but is now up at 1.6.    9/25/2020  Patient feels tired and fatigued. Getting out of bed and walking in the room without any desaturations. He was initially planned to be discharged which was held because patient's INR is subtherapeutic.    9/24/2020  Patient feels tired and fatigued. Not using any oxygen. She is holding her O2 saturations and dexamethasone was discontinued. We could consider patient discharge if she remains stable over 24 hours. 9/23/2020  HISTORY OF PRESENT ILLNESS:  69year old black female presents to the emergency department with complaint of generalized weakness and fatigue since fall from bed 3 weeks ago. She was seen at her primary care provider's office yesterday and underwent imaging, was directed to come to the hospital after review. She denies any significant shortness of breath, fever, chills, or cough above her baseline (Restrictive lung disease). She does have a coworker at [facility] [employer] who tested positive for COVID-19.  Patient is unsure why she fell initially and states she was well prior, but was too weak to get off the floor. She is maintaining saturation on room air. No treatment prior to arrival aside from home medications. Patient is fatigued and forgetful, is a poor historian.       REVIEW  OF  SYSTEMS:    Constitutional:  No fevers, chills, nausea, vomiting, + tiredness and fatigue   Lungs:   No hemoptysis, pleurisy, + SOB   Heart:  No chest pressure with exertion, no palpitations,    Abdomen:   No new masses, no bright red blood per rectum   Extremities: +difficulty ambulating due  to weakness, no new lesions   Neurologic: No new motor or sensory changes       PAST MEDICAL HISTORY:  Past Medical History:   Diagnosis Date    Abdominal pain     Abnormal EKG     Acute sinusitis     Allergic reaction to spider bite     Anemia     Anticoagulated     Anxiety     Arrhythmia     Asthmatic bronchitis without complication 4/52/3782    Ataxic gait     Lyons's esophagus     Bowel obstruction (HCC)     Callus     Cardiac pacemaker     CKD (chronic kidney disease) stage 2, GFR 60-89 ml/min     Coat's syndrome     Coat's syndrome     both eyes    Depression     Diabetes mellitus type 2 in nonobese (HCC)     Diabetic nephropathy (HCC)     Disequilibrium     Dizziness     DVT (deep venous thrombosis) (HCC)     Exudative retinopathy     Fibromyalgia     Fibromyositis     Gastric ulcer     GERD (gastroesophageal reflux disease)     History of gastric bypass     Hx of lupus anticoagulant disorder     Hypertension     Hypothyroidism     Intermittent claudication (HCC)     Intestinal obstruction (HCC)     Iron deficiency     Left-sided weakness     Low vitamin D level     Lupus (HCC)     Menopause     Obesity     Osteoarthritis     Osteoporosis     Other iron deficiency anemias     Palliative care patient 09/24/2020    Pernicious anemia     PUPP (pruritic urticarial papules and plaques of pregnancy)     Right leg numbness     Right sided sciatica     Sarcoidosis     with liver involvement    Sciatica     Secondary hyperparathyroidism ([address]     Shingles     Shortness of breath     Sleep apnea     Stomach ulcer     Syncope     Type II diabetes mellitus with nephropathy (HCC)     Visual loss, one eye          PAST SURGICAL HISTORY:  Past Surgical History:   Procedure Laterality Date    APPENDECTOMY      CARDIAC CATHETERIZATION  10/21/13  [facility]    EF over 60%    CHOLECYSTECTOMY      COLONOSCOPY  2/2010    negative    COLONOSCOPY  2/22/10    Dr Huey Jang  4/1/16    Dr LAKHWINDER Horvath-internal hemorrhoids, 5 yr recall    EYE SURGERY      Cyst on Right eye    EYE SURGERY      GASTRIC BYPASS SURGERY      GASTRIC BYPASS SURGERY      HERNIA REPAIR      HYSTERECTOMY      Complete    HYSTERECTOMY      Partial - because had a tubal pregnancy.      INCONTINENCE SURGERY      Bladder Sling    OTHER SURGICAL HISTORY      IVC filter    PACEMAKER INSERTION      PACEMAKER PLACEMENT      medtronic    IN TOTAL KNEE ARTHROPLASTY Right 3/26/2018    TOTAL KNEE REPLACEMENT COMPLEX PRIMARY performed by Dwaine Bhakta MD at [address] [place] [address] SMALL INTESTINE SURGERY      SPLENECTOMY      KRISTEL AND BSO      TONSILLECTOMY AND ADENOIDECTOMY      UPPER GASTROINTESTINAL ENDOSCOPY  12/2011    gerd s/p gastric bypass    UPPER GASTROINTESTINAL ENDOSCOPY  2/2014    normal s/p gastric bypass    UPPER GASTROINTESTINAL ENDOSCOPY  2/2010    biopsy neg Barretts, chronic reflux esophagitis s/p gastric bypass    UPPER GASTROINTESTINAL ENDOSCOPY  7/2006    unremarkable s/p gastric bypass    UPPER GASTROINTESTINAL ENDOSCOPY  8/10/15    Dr Dede Solorzano UPPER GASTROINTESTINAL ENDOSCOPY  4/1/16    Dr Chinedu Golden    UPPER GASTROINTESTINAL ENDOSCOPY N/A 4/1/2016    EGD ESOPHAGOGASTRODUODENOSCOPY performed by Leatha Hdez MD at [facility]    VENA CAVA FILTER PLACEMENT Right 2003        FAMILY HISTORY:  Family History   Problem Relation Age of Onset    Uterine Cancer Mother     Cervical Cancer Mother     Coronary Art Dis Mother     Heart Disease Father     Lung Cancer Father     Other Father         renal failure    Colon Cancer Brother     Colon Polyps Brother     Uterine Cancer Maternal Grandmother     Cervical Cancer Maternal Grandmother     Diabetes Maternal Grandmother     Cervical Cancer Sister     Other Sister         fibromyalgia    Diabetes Paternal Aunt     Esophageal Cancer Neg Hx     Liver Cancer Neg Hx     Liver Disease Neg Hx     Stomach Cancer Neg Hx     Rectal Cancer Neg Hx            OBJECTIVE:  BP (!) 122/54   Pulse 119   Temp 98 °F (36.7 °C) (Axillary)   Resp 21   Ht 5' 6\" (1.676 m)   Wt 222 lb 6.4 oz (100.9 kg)   SpO2 98%   BMI 35.90 kg/m²   I/O this shift:  In: 271.8 [P.O.:200; I.V.:71.8]  Out: -       PHYSICAL  EXAMINATION (done remotely to preserve PPE and due to COVID-19) . ..  Captured in coordination with the floor nurse:     KATHRYN:  Awake, alert, oriented x 3, patient appears tired and fatigued   Head/Eyes:  Normocephalic, atraumatic, EOMI and PERRLA bilaterally   Respiratory:   Bilateral decreased air entry in both lung fields, mild B/L crackles, symmetric expansion of chest (As per floor nurse)   Cardiovascular:  Regular rate and rhythm, S1+S2+0 (As per floor nurse)   Abdomen:   Non-distended   Extremities: Moves all, decreased range of motion, no edema   Neurologic: Awake, alert, oriented x 3, cranial nerves II-XII intact   Psychiatric: Normal mood, non-suicidal           CURRENT MEDICATIONS:  Scheduled:   warfarin (COUMADIN) daily dosing (placeholder)   Other RX Placeholder    [Held by provider] bumetanide  2 mg Oral Daily    escitalopram  20 mg Oral Daily    gabapentin  100 mg Oral TID    levothyroxine  100 mcg Oral Daily    lisinopril  40 mg Oral Daily    therapeutic multivitamin-minerals  1 tablet Oral Daily    pantoprazole  40 mg Oral BID AC    [Held by provider] potassium chloride  10 mEq Oral Daily    sodium chloride flush  10 mL Intravenous 2 times per day    levofloxacin  750 mg Intravenous Q24H    enoxaparin  1 mg/kg Subcutaneous BID    albuterol sulfate HFA  2 puff Inhalation 4x daily    And    ipratropium  2 puff Inhalation 4x daily    insulin lispro  0-12 Units Subcutaneous TID WC    insulin lispro  0-6 Units Subcutaneous Nightly    ferrous gluconate  324 mg Oral BID        PRN:  potassium chloride, 40 mEq, PRN    Or  potassium alternative oral replacement, 40 mEq, PRN    Or  potassium chloride, 10 mEq, PRN  magnesium sulfate, 1 g, PRN  baclofen, 10 mg, TID PRN  cloNIDine, 0.1 mg, TID PRN  sodium chloride flush, 10 mL, PRN  acetaminophen, 650 mg, Q6H PRN    Or  acetaminophen, 650 mg, Q6H PRN  polyethylene glycol, 17 g, Daily PRN  promethazine, 12.5 mg, Q6H PRN    Or  ondansetron, 4 mg, Q6H PRN  glucose, 15 g, PRN  dextrose, 12.5 g, PRN  glucagon (rDNA), 1 mg, PRN  dextrose, 100 mL/hr, PRN        Infusions:   sodium chloride 76.92 mL/hr at 09/26/20 0904    dextrose         Laboratory Data:  Recent Labs     09/24/20  0248 09/25/20  0245 09/26/20  0452   WBC 8.0 6.0 10.2   HGB 11.4* 10.9* 11.1*    252 258     Recent Labs     09/24/20  0248 09/25/20  0245 09/26/20  0452    138 139   K 4.2 3.4* 4.7    97* 104   CO2 17* 27 20*   BUN 18 22 35*   CREATININE 1.1* 0.5 1.6*   GLUCOSE 141* 90 95     Recent Labs     09/23/20  1435 09/24/20  0248   AST 56* 64*   ALT 22 24   BILITOT 1.0 0.6   ALKPHOS 66 63     Troponin T:   Recent Labs     09/23/20  1435   TROPONINI <0.01     Pro-BNP: No results for input(s): BNP in the last 72 hours.   INR:   Recent Labs     09/24/20  0248 09/25/20  0245 09/26/20  0452   INR 1.18 1.46* 1.19*     UA:  Recent Labs     09/23/20  1746   COLORU YELLOW   PHUR 6.5   WBCUA 1   RBCUA 1   BACTERIA TRACE*   CLARITYU Clear   SPECGRAV 1.028   LEUKOCYTESUR Negative   UROBILINOGEN 1.0   BILIRUBINUR Negative   BLOODU Negative   GLUCOSEU Negative     A1C: No results for input(s): LABA1C in the last 72 hours.  ABG:  Recent Labs     09/23/20  1733   PHART 7.460*   JAT6JFY 27.0*   PO2ART 60.0*   ZWV5CGX 19.2*   BEART -3.5*   HGBAE 11.2*   Z1QVMYVO 92.2   CARBOXHGBART 1.4         Imaging:    Ct Head Wo Contrast    Result Date: 9/23/2020  1. No acute intracranial process. Signed by Dr Nika Granado on 9/23/2020 4:11 PM    Ct Cervical Spine Wo Contrast    Result Date: 9/23/2020  1. No evidence of acute osseous injury in the cervical spine. Signed by Dr Nika Granado on 9/23/2020 4:09 PM    Ct Thoracic Spine Wo Contrast    Result Date: 9/23/2020  1. No evidence of acute osseous injury in the thoracic spine. 2. Bilateral multifocal lung infiltrates, most notably in the lung bases. Upper lobe infiltrates are rounded and groundglass, which raises suspicion for atypical pneumonia such as Covid 19. However, the bilateral lung bases appear more consolidated with earlier bronchograms. I would include pulmonary edema in the differential as well given the extent of the consolidation. The results of this exam were discussed with Dr. Florencia Gross on 9/23/2020 at 1617 hours Signed by Dr Nika Granado on 9/23/2020 4:17 PM    Ct Lumbar Spine Wo Contrast    Result Date: 9/23/2020  1. No evidence of acute injury in the osseous lumbar spine. Signed by Dr Nika Granado on 9/23/2020 4:19 PM    Ct Abdomen Pelvis W Iv Contrast Additional Contrast? None    Result Date: 9/23/2020  1. No acute process in the abdomen or pelvis. 2. Bibasilar consolidative lung infiltrates which appear to be conforming to more deep tendon distribution. Pulmonary edema certainly a consideration. Atypical pneumonia is also a consideration. 3. No evidence of bowel obstruction or viscus perforation. No peritoneal abscess. 4. No urolithiasis and/or hydronephrosis. 5. Prior gastric bypass. 6. Prior ventral hernia repair. No recurrent hernia. 7. IVC filter. Signed by Dr Nika Granado on 9/23/2020 4:26 PM    Xr Chest Portable    Result Date: 9/23/2020  1.  Shallow inspiration appreciated and agreed with  Discussed with the nurse and addressed all questions/concerns      Caleb Guerrero MD  9/26/2020 12:25 PM      DISCLAIMER: This note was created with electronic voice recognition which does have occasional errors. If you have any questions regarding the content within the note please do not hesitate to contact me. .. Thanks.

## 2020-09-26 NOTE — PLAN OF CARE
Problem: Airway Clearance - Ineffective  Goal: Achieve or maintain patent airway  9/26/2020 1513 by Allison Miller RN  Outcome: Ongoing  9/26/2020 0733 by Ale Marsh RN  Outcome: Ongoing     Problem: Gas Exchange - Impaired  Goal: Absence of hypoxia  9/26/2020 1513 by Allison Miller RN  Outcome: Ongoing  9/26/2020 0733 by Ale Marsh RN  Outcome: Ongoing  Goal: Promote optimal lung function  9/26/2020 1513 by Allison Miller RN  Outcome: Ongoing  9/26/2020 0733 by Ale Marsh RN  Outcome: Ongoing     Problem: Breathing Pattern - Ineffective  Goal: Ability to achieve and maintain a regular respiratory rate  9/26/2020 1513 by Allison Miller RN  Outcome: Ongoing  9/26/2020 0733 by Ale Marsh RN  Outcome: Ongoing     Problem:  Body Temperature -  Risk of, Imbalanced  Goal: Ability to maintain a body temperature within defined limits  9/26/2020 1513 by Allison Miller RN  Outcome: Ongoing  9/26/2020 0733 by Ale Marsh RN  Outcome: Ongoing  Goal: Will regain or maintain usual level of consciousness  9/26/2020 1513 by Allison Miller RN  Outcome: Ongoing  9/26/2020 0733 by Ale Marsh RN  Outcome: Ongoing  Goal: Complications related to the disease process, condition or treatment will be avoided or minimized  9/26/2020 1513 by Allison Miller RN  Outcome: Ongoing  9/26/2020 0733 by Ale Marsh RN  Outcome: Ongoing     Problem: Isolation Precautions - Risk of Spread of Infection  Goal: Prevent transmission of infection  9/26/2020 1513 by Allison Miller RN  Outcome: Ongoing  9/26/2020 0733 by Ale Marsh RN  Outcome: Ongoing     Problem: Nutrition Deficits  Goal: Optimize nutrtional status  9/26/2020 1513 by Allison Miller RN  Outcome: Ongoing  9/26/2020 0733 by Ale Marsh RN  Outcome: Ongoing     Problem: Risk for Fluid Volume Deficit  Goal: Maintain normal heart rhythm  9/26/2020 1513 by Allison Miller RN  Outcome: Ongoing  9/26/2020 0733 by Antonella Pereira RN  Outcome: Ongoing  Goal: Maintain absence of muscle cramping  9/26/2020 1513 by Ke Cunningham RN  Outcome: Ongoing  9/26/2020 0733 by Antonella Pereira RN  Outcome: Ongoing  Goal: Maintain normal serum potassium, sodium, calcium, phosphorus, and pH  9/26/2020 1513 by Ke Cunningham RN  Outcome: Ongoing  9/26/2020 0733 by Antonella ePreira RN  Outcome: Ongoing     Problem: Loneliness or Risk for Loneliness  Goal: Demonstrate positive use of time alone when socialization is not possible  9/26/2020 1513 by Ke Cunningham RN  Outcome: Ongoing  9/26/2020 0733 by Antonella Pereira RN  Outcome: Ongoing     Problem: Fatigue  Goal: Verbalize increase energy and improved vitality  9/26/2020 1513 by Ke Cunningham RN  Outcome: Ongoing  9/26/2020 0733 by Antonella Pereira RN  Outcome: Ongoing     Problem: Patient Education: Go to Patient Education Activity  Goal: Patient/Family Education  9/26/2020 1513 by Ke Cunningham RN  Outcome: Ongoing  9/26/2020 0733 by Antonella Pereira RN  Outcome: Ongoing     Problem: Falls - Risk of:  Goal: Will remain free from falls  Description: Will remain free from falls  9/26/2020 1513 by Ke Cunningham RN  Outcome: Ongoing  9/26/2020 0733 by Antonella Pereira RN  Outcome: Ongoing  Goal: Absence of physical injury  Description: Absence of physical injury  9/26/2020 1513 by Ke Cunningham RN  Outcome: Ongoing  9/26/2020 0733 by Antonella Pereira RN  Outcome: Ongoing

## 2020-09-26 NOTE — PROGRESS NOTES
[place], [place], [address]        DEPARTMENT OF HOSPITALIST MEDICINE        PROGRESS  NOTE:    NOTE: Portions of this note have been copied forward, however, changed to reflect the most current clinical status of this patient. Patient: Crista Michael  YOB: 1950  Date of Service: 9/25/2020  MRN: [id number]   Acct: [de-identified]   Primary Care Physician: Gianfranco Desai MD  Advance Directive: Full Code  Admit Date: 9/23/2020       Hospital Day: 2      CHIEF COMPLAINT:  Chief Complaint   Patient presents with    Fatigue     decreased PO intake, has not taken home medications in over 2 weeks    Flank Pain     left sided       [address]:    9/25/2020  Patient feels tired and fatigued. Getting out of bed and walking in the room without any desaturations. He was initially planned to be discharged which was held because patient's INR is subtherapeutic.    9/24/2020  Patient feels tired and fatigued. Not using any oxygen. She is holding her O2 saturations and dexamethasone was discontinued. We could consider patient discharge if she remains stable over 24 hours. 9/23/2020  HISTORY OF PRESENT ILLNESS:  69year old black female presents to the emergency department with complaint of generalized weakness and fatigue since fall from bed 3 weeks ago. She was seen at her primary care provider's office yesterday and underwent imaging, was directed to come to the hospital after review. She denies any significant shortness of breath, fever, chills, or cough above her baseline (Restrictive lung disease). She does have a coworker at [facility] who tested positive for COVID-19. Patient is unsure why she fell initially and states she was well prior, but was too weak to get off the floor. She is maintaining saturation on room air. No treatment prior to arrival aside from home medications. Patient is fatigued and forgetful, is a poor historian.       REVIEW  OF SYSTEMS:    Constitutional:  No fevers, chills, nausea, vomiting, + tiredness and fatigue   Lungs:   No hemoptysis, pleurisy, + SOB   Heart:  No chest pressure with exertion, no palpitations,    Abdomen:   No new masses, no bright red blood per rectum   Extremities: +difficulty ambulating due  to weakness, no new lesions   Neurologic: No new motor or sensory changes       PAST MEDICAL HISTORY:  Past Medical History:   Diagnosis Date    Abdominal pain     Abnormal EKG     Acute sinusitis     Allergic reaction to spider bite     Anemia     Anticoagulated     Anxiety     Arrhythmia     Asthmatic bronchitis without complication 1/69/9042    Ataxic gait     Lyons's esophagus     Bowel obstruction (HCC)     Callus     Cardiac pacemaker     CKD (chronic kidney disease) stage 2, GFR 60-89 ml/min     Coat's syndrome     Coat's syndrome     both eyes    Depression     Diabetes mellitus type 2 in nonobese (HCC)     Diabetic nephropathy (HCC)     Disequilibrium     Dizziness     DVT (deep venous thrombosis) (HCC)     Exudative retinopathy     Fibromyalgia     Fibromyositis     Gastric ulcer     GERD (gastroesophageal reflux disease)     History of gastric bypass     Hx of lupus anticoagulant disorder     Hypertension     Hypothyroidism     Intermittent claudication (HCC)     Intestinal obstruction (HCC)     Iron deficiency     Left-sided weakness     Low vitamin D level     Lupus (HCC)     Menopause     Obesity     Osteoarthritis     Osteoporosis     Other iron deficiency anemias     Palliative care patient 09/24/2020    Pernicious anemia     PUPP (pruritic urticarial papules and plaques of pregnancy)     Right leg numbness     Right sided sciatica     Sarcoidosis     with liver involvement    Sciatica     Secondary hyperparathyroidism ([address]     Shingles     Shortness of breath     Sleep apnea     Stomach ulcer     Syncope     Type II diabetes mellitus with nephropathy ([address]     Visual loss, one eye          PAST SURGICAL HISTORY:  Past Surgical History:   Procedure Laterality Date    APPENDECTOMY      CARDIAC CATHETERIZATION  10/21/13  [address]    EF over 60%    CHOLECYSTECTOMY      COLONOSCOPY  2/2010    negative    COLONOSCOPY  2/22/10    Dr Landon Castillo  4/1/16    Dr LAKHWINDER Horvath-internal hemorrhoids, 5 yr recall    EYE SURGERY      Cyst on Right eye    EYE SURGERY      GASTRIC BYPASS SURGERY      GASTRIC BYPASS SURGERY      HERNIA REPAIR      HYSTERECTOMY      Complete    HYSTERECTOMY      Partial - because had a tubal pregnancy.      INCONTINENCE SURGERY      Bladder Sling    OTHER SURGICAL HISTORY      IVC filter    PACEMAKER INSERTION      PACEMAKER PLACEMENT      medtronic    NH TOTAL KNEE ARTHROPLASTY Right 3/26/2018    TOTAL KNEE REPLACEMENT COMPLEX PRIMARY performed by Breanna Sutton MD at [address] [facility] SMALL INTESTINE SURGERY      SPLENECTOMY      KRISTEL AND BSO      TONSILLECTOMY AND ADENOIDECTOMY      UPPER GASTROINTESTINAL ENDOSCOPY  12/2011    gerd s/p gastric bypass    UPPER GASTROINTESTINAL ENDOSCOPY  2/2014    normal s/p gastric bypass    UPPER GASTROINTESTINAL ENDOSCOPY  2/2010    biopsy neg Barretts, chronic reflux esophagitis s/p gastric bypass    UPPER GASTROINTESTINAL ENDOSCOPY  7/2006    unremarkable s/p gastric bypass    UPPER GASTROINTESTINAL ENDOSCOPY  8/10/15    Dr Rach Gaxiola UPPER GASTROINTESTINAL ENDOSCOPY  4/1/16    Dr Shakila Anaya    UPPER GASTROINTESTINAL ENDOSCOPY N/A 4/1/2016    EGD ESOPHAGOGASTRODUODENOSCOPY performed by Keyshawn Dodd MD at [address] [facility]    VENA CAVA FILTER PLACEMENT Right 2003        FAMILY HISTORY:  Family History   Problem Relation Age of Onset    Uterine Cancer Mother     Cervical Cancer Mother     Coronary Art Dis Mother     Heart Disease Father     Lung Cancer Father     Other Father         renal failure    Colon Cancer Brother     Colon Polyps Brother     Uterine Cancer Maternal Grandmother     Cervical Cancer Maternal Grandmother     Diabetes Maternal Grandmother     Cervical Cancer Sister     Other Sister         fibromyalgia    Diabetes Paternal Aunt     Esophageal Cancer Neg Hx     Liver Cancer Neg Hx     Liver Disease Neg Hx     Stomach Cancer Neg Hx     Rectal Cancer Neg Hx            OBJECTIVE:  BP (!) 147/70   Pulse 58   Temp 97.5 °F (36.4 °C) (Axillary)   Resp 23   Ht 5' 6\" (1.676 m)   Wt 222 lb 6.4 oz (100.9 kg)   SpO2 93%   BMI 35.90 kg/m²   I/O this shift:  In: -   Out: 1000 [Urine:1000]      PHYSICAL  EXAMINATION (done remotely to preserve PPE and due to COVID-19) . ..  Captured in coordination with the floor nurse:     KATHRYN:  Awake, alert, oriented x 3, patient appears tired and fatigued   Head/Eyes:  Normocephalic, atraumatic, EOMI and PERRLA bilaterally   Respiratory:   Bilateral decreased air entry in both lung fields, mild B/L crackles, symmetric expansion of chest (As per floor nurse)   Cardiovascular:  Regular rate and rhythm, S1+S2+0 (As per floor nurse)   Abdomen:   Non-distended   Extremities: Moves all, decreased range of motion, no edema   Neurologic: Awake, alert, oriented x 3, cranial nerves II-XII intact   Psychiatric: Normal mood, non-suicidal           CURRENT MEDICATIONS:  Scheduled:   [START ON 9/26/2020] warfarin (COUMADIN) daily dosing (placeholder)   Other RX Placeholder    bumetanide  2 mg Oral Daily    escitalopram  20 mg Oral Daily    gabapentin  100 mg Oral TID    levothyroxine  100 mcg Oral Daily    lisinopril  40 mg Oral Daily    therapeutic multivitamin-minerals  1 tablet Oral Daily    pantoprazole  40 mg Oral BID AC    potassium chloride  10 mEq Oral Daily    sodium chloride flush  10 mL Intravenous 2 times per day    levofloxacin  750 mg Intravenous Q24H    enoxaparin  1 mg/kg Subcutaneous BID    albuterol sulfate HFA  2 puff Inhalation 4x daily    And    ipratropium  2 puff Inhalation 4x daily [id number] 92.2   CARBOXHGBART 1.4         Imaging:    Ct Head Wo Contrast    Result Date: 9/23/2020  1. No acute intracranial process. Signed by Dr Florencia Adhikari on 9/23/2020 4:11 PM    Ct Cervical Spine Wo Contrast    Result Date: 9/23/2020  1. No evidence of acute osseous injury in the cervical spine. Signed by Dr Florencia Adhikari on 9/23/2020 4:09 PM    Ct Thoracic Spine Wo Contrast    Result Date: 9/23/2020  1. No evidence of acute osseous injury in the thoracic spine. 2. Bilateral multifocal lung infiltrates, most notably in the lung bases. Upper lobe infiltrates are rounded and groundglass, which raises suspicion for atypical pneumonia such as Covid 19. However, the bilateral lung bases appear more consolidated with earlier bronchograms. I would include pulmonary edema in the differential as well given the extent of the consolidation. The results of this exam were discussed with Dr. Eliecer Benedict on 9/23/2020 at 1617 hours Signed by Dr Florencia Adhikari on 9/23/2020 4:17 PM    Ct Lumbar Spine Wo Contrast    Result Date: 9/23/2020  1. No evidence of acute injury in the osseous lumbar spine. Signed by Dr Florencia Adhikari on 9/23/2020 4:19 PM    Ct Abdomen Pelvis W Iv Contrast Additional Contrast? None    Result Date: 9/23/2020  1. No acute process in the abdomen or pelvis. 2. Bibasilar consolidative lung infiltrates which appear to be conforming to more deep tendon distribution. Pulmonary edema certainly a consideration. Atypical pneumonia is also a consideration. 3. No evidence of bowel obstruction or viscus perforation. No peritoneal abscess. 4. No urolithiasis and/or hydronephrosis. 5. Prior gastric bypass. 6. Prior ventral hernia repair. No recurrent hernia. 7. IVC filter. Signed by Dr Florencia Adhikari on 9/23/2020 4:26 PM    Xr Chest Portable    Result Date: 9/23/2020  1. Shallow inspiration with mild bibasilar atelectasis. 2. Underlying cardiomegaly. No interstitial or jose carlos pulmonary edema.  Signed by Dr Florencia Adhikari on 9/23/2020 2:59 PM       ASSESSMENT & PLAN:    Principal Problem:    COVID-19  Active Problems:    Iron deficiency anemia    Restrictive airway disease    DVT, lower extremity, proximal, acute, unspecified laterality (HCC)    Type II diabetes mellitus with nephropathy (HCC)    Generalized weakness    Accidental fall from bed    Palliative care patient  Resolved Problems:    * No resolved hospital problems. *      Continue with current medications  Continue with COVID-19 aspiratory plus precautions  Patient does not require any oxygen hence we will DC dexamethasone  Continue diabetic meds  Accu-Cheks qAC and qHS ordered with low-dose insulin coverage as per protocol  Continue with the p.o. Levaquin . .. Day #3/5  Patient's INR subtherapeutic at 1.46  Patient given Coumadin 10 mg x 1 dose  Pharmacy consult for Coumadin management to keep INR between 2 and 3  Continue with p.o. diuresis  Strict I's and O's  DC planning home with home health care once patient INR is therapeutic      Repeat labs in a.m. Electrolyte replacement as per protocol. Patient will be monitored very closely on the floor. Further recommendations as per the hospital course. Discharge Plan:    Case management consult for safe DC planning of the patient with appropriate self-quarantine at home to ensure patient does not become a cause for community spread of COVID-19. Patient needs to be established with health department prior to discharge. Patient  is on DVT prophylaxis  Current medications reviewed  Lab work reviewed  Radiology/Chest x-ray films reviewed  Treatment recommendations from suspecialities reviewed, appreciated and agreed with  Discussed with the nurse and addressed all questions/concerns      Suzy Zarate MD  9/25/2020 9:51 PM      DISCLAIMER: This note was created with electronic voice recognition which does have occasional errors.   If you have any questions regarding the content within the note please do not hesitate to contact me. .. Thanks.

## 2020-09-26 NOTE — PROGRESS NOTES
Clinical Pharmacy Note    Warfarin consult follow-up    Recent Labs     09/26/20  0452   INR 1.19*     Recent Labs     09/24/20  0248 09/25/20  0245 09/26/20  0452   HGB 11.4* 10.9* 11.1*   HCT 35.2* 33.9* 33.8*    252 258     Significant Drug-Drug Interactions:  New warfarin drug-drug interactions: None  Discontinued drug-drug interactions: Zithromax, Rocephin    Date INR Warfarin Dose    09/25/20 1.46 10 mg    09/26/20  1.19   15 mg                                              Notes:  Give Coumadin 15 mg by mouth x 1 dose tonight. Daily PT/INR until stable within therapeutic range.      Electronically signed by Rian Solis PharmD, BCPS on 9/26/2020 at 2:12 PM

## 2020-09-26 NOTE — PROGRESS NOTES
Pharmacy Renal Adjustment    Meredith Tai is a 69 y.o. female. Pharmacy has renally adjusted medications per protocol. Recent Labs     09/25/20  0245 09/26/20  0452   BUN 22 35*       Recent Labs     09/25/20  0245 09/26/20  0452   CREATININE 0.5 1.6*       Estimated Creatinine Clearance: 39 mL/min (A) (based on SCr of 1.6 mg/dL (H)). Height:   Ht Readings from Last 1 Encounters:   09/24/20 5' 6\" (1.676 m)     Weight:  Wt Readings from Last 1 Encounters:   09/25/20 222 lb 6.4 oz (100.9 kg)     Plan: Adjust the following medications based on renal function:           Levaquin adjusted to 750 mg IV every 48 hours.     Electronically signed by Valorie Juan PharmD, BCPS on 9/26/2020 at 2:07 PM

## 2020-09-27 LAB
ACANTHOCYTES: ABNORMAL
ANION GAP SERPL CALCULATED.3IONS-SCNC: 14 MMOL/L (ref 7–19)
APTT: 41 SEC (ref 26–36.2)
BANDED NEUTROPHILS RELATIVE PERCENT: 2 % (ref 0–5)
BASOPHILS ABSOLUTE: 0 K/UL (ref 0–0.2)
BASOPHILS RELATIVE PERCENT: 0 % (ref 0–1)
BUN BLDV-MCNC: 30 MG/DL (ref 8–23)
BURR CELLS: ABNORMAL
CALCIUM SERPL-MCNC: 8.5 MG/DL (ref 8.8–10.2)
CHLORIDE BLD-SCNC: 101 MMOL/L (ref 98–111)
CO2: 20 MMOL/L (ref 22–29)
CREAT SERPL-MCNC: 1.4 MG/DL (ref 0.5–0.9)
D DIMER: 3.78 UG/ML FEU (ref 0–0.48)
EOSINOPHILS ABSOLUTE: 0.12 K/UL (ref 0–0.6)
EOSINOPHILS RELATIVE PERCENT: 1 % (ref 0–5)
FIBRINOGEN: 485 MG/DL (ref 238–505)
GFR AFRICAN AMERICAN: 45
GFR NON-AFRICAN AMERICAN: 37
GLUCOSE BLD-MCNC: 104 MG/DL (ref 74–109)
GLUCOSE BLD-MCNC: 114 MG/DL (ref 70–99)
GLUCOSE BLD-MCNC: 98 MG/DL (ref 70–99)
HCT VFR BLD CALC: 32.4 % (ref 37–47)
HEMOGLOBIN: 10.7 G/DL (ref 12–16)
IMMATURE GRANULOCYTES #: 0.7 K/UL
INR BLD: 1.29 (ref 0.88–1.18)
LYMPHOCYTES ABSOLUTE: 2 K/UL (ref 1.1–4.5)
LYMPHOCYTES RELATIVE PERCENT: 16 % (ref 20–40)
MCH RBC QN AUTO: 28.9 PG (ref 27–31)
MCHC RBC AUTO-ENTMCNC: 33 G/DL (ref 33–37)
MCV RBC AUTO: 87.6 FL (ref 81–99)
MONOCYTES ABSOLUTE: 1.6 K/UL (ref 0–0.9)
MONOCYTES RELATIVE PERCENT: 13 % (ref 0–10)
MYELOCYTE PERCENT: 2 %
NEUTROPHILS ABSOLUTE: 8.5 K/UL (ref 1.5–7.5)
NEUTROPHILS RELATIVE PERCENT: 66 % (ref 50–65)
PDW BLD-RTO: 14.7 % (ref 11.5–14.5)
PERFORMED ON: ABNORMAL
PERFORMED ON: NORMAL
PLATELET # BLD: 323 K/UL (ref 130–400)
PLATELET SLIDE REVIEW: ADEQUATE
PMV BLD AUTO: 11 FL (ref 9.4–12.3)
POTASSIUM SERPL-SCNC: 4 MMOL/L (ref 3.5–5)
PROTHROMBIN TIME: 16.2 SEC (ref 12–14.6)
RBC # BLD: 3.7 M/UL (ref 4.2–5.4)
SODIUM BLD-SCNC: 135 MMOL/L (ref 136–145)
WBC # BLD: 12.2 K/UL (ref 4.8–10.8)

## 2020-09-27 PROCEDURE — 82947 ASSAY GLUCOSE BLOOD QUANT: CPT

## 2020-09-27 PROCEDURE — 85730 THROMBOPLASTIN TIME PARTIAL: CPT

## 2020-09-27 PROCEDURE — 6370000000 HC RX 637 (ALT 250 FOR IP): Performed by: HOSPITALIST

## 2020-09-27 PROCEDURE — 6360000002 HC RX W HCPCS: Performed by: FAMILY MEDICINE

## 2020-09-27 PROCEDURE — 2100000000 HC CCU R&B

## 2020-09-27 PROCEDURE — 85610 PROTHROMBIN TIME: CPT

## 2020-09-27 PROCEDURE — 85384 FIBRINOGEN ACTIVITY: CPT

## 2020-09-27 PROCEDURE — 2580000003 HC RX 258: Performed by: FAMILY MEDICINE

## 2020-09-27 PROCEDURE — 2580000003 HC RX 258: Performed by: HOSPITALIST

## 2020-09-27 PROCEDURE — 6370000000 HC RX 637 (ALT 250 FOR IP): Performed by: FAMILY MEDICINE

## 2020-09-27 PROCEDURE — 85025 COMPLETE CBC W/AUTO DIFF WBC: CPT

## 2020-09-27 PROCEDURE — 80048 BASIC METABOLIC PNL TOTAL CA: CPT

## 2020-09-27 PROCEDURE — 85379 FIBRIN DEGRADATION QUANT: CPT

## 2020-09-27 RX ORDER — SODIUM CHLORIDE 9 MG/ML
INJECTION, SOLUTION INTRAVENOUS CONTINUOUS
Status: ACTIVE | OUTPATIENT
Start: 2020-09-27 | End: 2020-09-27

## 2020-09-27 RX ORDER — WARFARIN SODIUM 5 MG/1
15 TABLET ORAL
Status: COMPLETED | OUTPATIENT
Start: 2020-09-27 | End: 2020-09-27

## 2020-09-27 RX ADMIN — ALBUTEROL SULFATE 2 PUFF: 90 AEROSOL, METERED RESPIRATORY (INHALATION) at 21:22

## 2020-09-27 RX ADMIN — FERROUS GLUCONATE TAB 324 MG (37.5 MG ELEMENTAL IRON) 324 MG: 324 (37.5 FE) TAB at 09:19

## 2020-09-27 RX ADMIN — LEVOFLOXACIN 750 MG: 5 INJECTION, SOLUTION INTRAVENOUS at 21:15

## 2020-09-27 RX ADMIN — ENOXAPARIN SODIUM 100 MG: 100 INJECTION SUBCUTANEOUS at 09:19

## 2020-09-27 RX ADMIN — ALBUTEROL SULFATE 2 PUFF: 90 AEROSOL, METERED RESPIRATORY (INHALATION) at 12:43

## 2020-09-27 RX ADMIN — FERROUS GLUCONATE TAB 324 MG (37.5 MG ELEMENTAL IRON) 324 MG: 324 (37.5 FE) TAB at 21:16

## 2020-09-27 RX ADMIN — ESCITALOPRAM OXALATE 20 MG: 10 TABLET ORAL at 09:19

## 2020-09-27 RX ADMIN — LISINOPRIL 40 MG: 20 TABLET ORAL at 09:19

## 2020-09-27 RX ADMIN — IPRATROPIUM BROMIDE 2 PUFF: 17 AEROSOL, METERED RESPIRATORY (INHALATION) at 09:18

## 2020-09-27 RX ADMIN — IPRATROPIUM BROMIDE 2 PUFF: 17 AEROSOL, METERED RESPIRATORY (INHALATION) at 21:22

## 2020-09-27 RX ADMIN — IPRATROPIUM BROMIDE 2 PUFF: 17 AEROSOL, METERED RESPIRATORY (INHALATION) at 15:32

## 2020-09-27 RX ADMIN — LEVOTHYROXINE SODIUM 100 MCG: 100 TABLET ORAL at 06:09

## 2020-09-27 RX ADMIN — SODIUM CHLORIDE: 9 INJECTION, SOLUTION INTRAVENOUS at 10:02

## 2020-09-27 RX ADMIN — PANTOPRAZOLE SODIUM 40 MG: 40 TABLET, DELAYED RELEASE ORAL at 06:09

## 2020-09-27 RX ADMIN — Medication 10 ML: at 09:19

## 2020-09-27 RX ADMIN — GABAPENTIN 100 MG: 100 CAPSULE ORAL at 21:16

## 2020-09-27 RX ADMIN — ENOXAPARIN SODIUM 100 MG: 100 INJECTION SUBCUTANEOUS at 21:15

## 2020-09-27 RX ADMIN — WARFARIN SODIUM 15 MG: 5 TABLET ORAL at 17:33

## 2020-09-27 RX ADMIN — GABAPENTIN 100 MG: 100 CAPSULE ORAL at 15:32

## 2020-09-27 RX ADMIN — IPRATROPIUM BROMIDE 2 PUFF: 17 AEROSOL, METERED RESPIRATORY (INHALATION) at 12:43

## 2020-09-27 RX ADMIN — ALBUTEROL SULFATE 2 PUFF: 90 AEROSOL, METERED RESPIRATORY (INHALATION) at 15:32

## 2020-09-27 RX ADMIN — ALBUTEROL SULFATE 2 PUFF: 90 AEROSOL, METERED RESPIRATORY (INHALATION) at 09:18

## 2020-09-27 RX ADMIN — GABAPENTIN 100 MG: 100 CAPSULE ORAL at 09:19

## 2020-09-27 RX ADMIN — PANTOPRAZOLE SODIUM 40 MG: 40 TABLET, DELAYED RELEASE ORAL at 15:32

## 2020-09-27 RX ADMIN — MULTIPLE VITAMINS W/ MINERALS TAB 1 TABLET: TAB at 09:19

## 2020-09-27 ASSESSMENT — PAIN SCALES - GENERAL
PAINLEVEL_OUTOF10: 0
PAINLEVEL_OUTOF10: 0

## 2020-09-27 NOTE — PLAN OF CARE
Problem: Airway Clearance - Ineffective  Goal: Achieve or maintain patent airway  9/26/2020 2339 by Armani Ames RN  Outcome: Ongoing  9/26/2020 1513 by Danish Granado RN  Outcome: Ongoing     Problem: Gas Exchange - Impaired  Goal: Absence of hypoxia  9/26/2020 2339 by Armani Ames RN  Outcome: Ongoing  9/26/2020 1513 by Danish Granado RN  Outcome: Ongoing  Goal: Promote optimal lung function  9/26/2020 2339 by Armani Ames RN  Outcome: Ongoing  9/26/2020 1513 by Danish Granado RN  Outcome: Ongoing     Problem: Breathing Pattern - Ineffective  Goal: Ability to achieve and maintain a regular respiratory rate  9/26/2020 2339 by Armani Ames RN  Outcome: Ongoing  9/26/2020 1513 by Danish Granado RN  Outcome: Ongoing     Problem:  Body Temperature -  Risk of, Imbalanced  Goal: Ability to maintain a body temperature within defined limits  9/26/2020 2339 by Armani Ames RN  Outcome: Ongoing  9/26/2020 1513 by Danish Granado RN  Outcome: Ongoing  Goal: Will regain or maintain usual level of consciousness  9/26/2020 2339 by Armani Ames RN  Outcome: Ongoing  9/26/2020 1513 by Danish Granado RN  Outcome: Ongoing  Goal: Complications related to the disease process, condition or treatment will be avoided or minimized  9/26/2020 2339 by Armani Ames RN  Outcome: Ongoing  9/26/2020 1513 by Danish Granado RN  Outcome: Ongoing     Problem: Isolation Precautions - Risk of Spread of Infection  Goal: Prevent transmission of infection  9/26/2020 2339 by Armani Ames RN  Outcome: Ongoing  9/26/2020 1513 by Danish Granado RN  Outcome: Ongoing     Problem: Nutrition Deficits  Goal: Optimize nutrtional status  9/26/2020 2339 by Armani Ames RN  Outcome: Ongoing  9/26/2020 1513 by Danish Granado RN  Outcome: Ongoing     Problem: Risk for Fluid Volume Deficit  Goal: Maintain normal heart rhythm  9/26/2020 2339 by Armani Ames RN  Outcome: Ongoing  9/26/2020 1513 by Danish Granado RN  Outcome: Ongoing  Goal: Maintain

## 2020-09-27 NOTE — PROGRESS NOTES
Clinical Pharmacy Note    Warfarin consult follow-up    Recent Labs     09/27/20  0600   INR 1.29*     Recent Labs     09/25/20  0245 09/26/20  0452 09/27/20  0600   HGB 10.9* 11.1* 10.7*   HCT 33.9* 33.8* 32.4*    258 323     Significant Drug-Drug Interactions:  New warfarin drug-drug interactions: None  Discontinued drug-drug interactions: None    Date INR Warfarin Dose    09/25/20 1.46 10 mg    09/26/20  1.19   15 mg      09/27/20  1.29  15 mg                                      Notes: Give Coumadin 15 mg by mouth x 1 dose tonight. Daily PT/INR until stable within therapeutic range.      Electronically signed by Valorie Juan PharmD, BCPS on 9/27/2020 at 10:40 AM

## 2020-09-28 LAB
ANION GAP SERPL CALCULATED.3IONS-SCNC: 11 MMOL/L (ref 7–19)
APTT: 56.6 SEC (ref 26–36.2)
BASOPHILS ABSOLUTE: 0.1 K/UL (ref 0–0.2)
BASOPHILS RELATIVE PERCENT: 0.6 % (ref 0–1)
BLOOD CULTURE, ROUTINE: NORMAL
BUN BLDV-MCNC: 27 MG/DL (ref 8–23)
CALCIUM SERPL-MCNC: 8.8 MG/DL (ref 8.8–10.2)
CHLORIDE BLD-SCNC: 105 MMOL/L (ref 98–111)
CO2: 20 MMOL/L (ref 22–29)
CREAT SERPL-MCNC: 1.5 MG/DL (ref 0.5–0.9)
CULTURE, BLOOD 2: NORMAL
D DIMER: 3.89 UG/ML FEU (ref 0–0.48)
EOSINOPHILS ABSOLUTE: 0.1 K/UL (ref 0–0.6)
EOSINOPHILS RELATIVE PERCENT: 0.8 % (ref 0–5)
FIBRINOGEN: 471 MG/DL (ref 238–505)
GFR AFRICAN AMERICAN: 41
GFR NON-AFRICAN AMERICAN: 34
GLUCOSE BLD-MCNC: 103 MG/DL (ref 70–99)
GLUCOSE BLD-MCNC: 104 MG/DL (ref 70–99)
GLUCOSE BLD-MCNC: 110 MG/DL (ref 74–109)
GLUCOSE BLD-MCNC: 115 MG/DL (ref 70–99)
GLUCOSE BLD-MCNC: 122 MG/DL (ref 70–99)
HCT VFR BLD CALC: 31.4 % (ref 37–47)
HEMOGLOBIN: 10.1 G/DL (ref 12–16)
IMMATURE GRANULOCYTES #: 1.2 K/UL
INR BLD: 1.62 (ref 0.88–1.18)
LYMPHOCYTES ABSOLUTE: 1.9 K/UL (ref 1.1–4.5)
LYMPHOCYTES RELATIVE PERCENT: 17.3 % (ref 20–40)
MCH RBC QN AUTO: 28.8 PG (ref 27–31)
MCHC RBC AUTO-ENTMCNC: 32.2 G/DL (ref 33–37)
MCV RBC AUTO: 89.5 FL (ref 81–99)
MONOCYTES ABSOLUTE: 1.3 K/UL (ref 0–0.9)
MONOCYTES RELATIVE PERCENT: 12.2 % (ref 0–10)
NEUTROPHILS ABSOLUTE: 6.4 K/UL (ref 1.5–7.5)
NEUTROPHILS RELATIVE PERCENT: 58.3 % (ref 50–65)
PDW BLD-RTO: 15.1 % (ref 11.5–14.5)
PERFORMED ON: ABNORMAL
PLATELET # BLD: 373 K/UL (ref 130–400)
PMV BLD AUTO: 10.9 FL (ref 9.4–12.3)
POTASSIUM SERPL-SCNC: 4 MMOL/L (ref 3.5–5)
PROTHROMBIN TIME: 19.4 SEC (ref 12–14.6)
RBC # BLD: 3.51 M/UL (ref 4.2–5.4)
SODIUM BLD-SCNC: 136 MMOL/L (ref 136–145)
WBC # BLD: 10.9 K/UL (ref 4.8–10.8)

## 2020-09-28 PROCEDURE — 6370000000 HC RX 637 (ALT 250 FOR IP): Performed by: FAMILY MEDICINE

## 2020-09-28 PROCEDURE — 97110 THERAPEUTIC EXERCISES: CPT

## 2020-09-28 PROCEDURE — 85610 PROTHROMBIN TIME: CPT

## 2020-09-28 PROCEDURE — 2100000000 HC CCU R&B

## 2020-09-28 PROCEDURE — 85730 THROMBOPLASTIN TIME PARTIAL: CPT

## 2020-09-28 PROCEDURE — 85379 FIBRIN DEGRADATION QUANT: CPT

## 2020-09-28 PROCEDURE — 6360000002 HC RX W HCPCS: Performed by: FAMILY MEDICINE

## 2020-09-28 PROCEDURE — 82947 ASSAY GLUCOSE BLOOD QUANT: CPT

## 2020-09-28 PROCEDURE — 6370000000 HC RX 637 (ALT 250 FOR IP): Performed by: HOSPITALIST

## 2020-09-28 PROCEDURE — 85025 COMPLETE CBC W/AUTO DIFF WBC: CPT

## 2020-09-28 PROCEDURE — 80048 BASIC METABOLIC PNL TOTAL CA: CPT

## 2020-09-28 PROCEDURE — 85384 FIBRINOGEN ACTIVITY: CPT

## 2020-09-28 PROCEDURE — 97530 THERAPEUTIC ACTIVITIES: CPT

## 2020-09-28 RX ADMIN — GABAPENTIN 100 MG: 100 CAPSULE ORAL at 20:19

## 2020-09-28 RX ADMIN — WARFARIN SODIUM 12.5 MG: 2.5 TABLET ORAL at 18:29

## 2020-09-28 RX ADMIN — FERROUS GLUCONATE TAB 324 MG (37.5 MG ELEMENTAL IRON) 324 MG: 324 (37.5 FE) TAB at 08:33

## 2020-09-28 RX ADMIN — LISINOPRIL 40 MG: 20 TABLET ORAL at 08:33

## 2020-09-28 RX ADMIN — PANTOPRAZOLE SODIUM 40 MG: 40 TABLET, DELAYED RELEASE ORAL at 05:02

## 2020-09-28 RX ADMIN — ENOXAPARIN SODIUM 100 MG: 100 INJECTION SUBCUTANEOUS at 20:19

## 2020-09-28 RX ADMIN — ALBUTEROL SULFATE 2 PUFF: 90 AEROSOL, METERED RESPIRATORY (INHALATION) at 20:20

## 2020-09-28 RX ADMIN — ENOXAPARIN SODIUM 100 MG: 100 INJECTION SUBCUTANEOUS at 08:33

## 2020-09-28 RX ADMIN — IPRATROPIUM BROMIDE 2 PUFF: 17 AEROSOL, METERED RESPIRATORY (INHALATION) at 12:00

## 2020-09-28 RX ADMIN — ALBUTEROL SULFATE 2 PUFF: 90 AEROSOL, METERED RESPIRATORY (INHALATION) at 08:35

## 2020-09-28 RX ADMIN — MULTIPLE VITAMINS W/ MINERALS TAB 1 TABLET: TAB at 08:33

## 2020-09-28 RX ADMIN — LEVOTHYROXINE SODIUM 100 MCG: 100 TABLET ORAL at 05:02

## 2020-09-28 RX ADMIN — ALBUTEROL SULFATE 2 PUFF: 90 AEROSOL, METERED RESPIRATORY (INHALATION) at 12:00

## 2020-09-28 RX ADMIN — FERROUS GLUCONATE TAB 324 MG (37.5 MG ELEMENTAL IRON) 324 MG: 324 (37.5 FE) TAB at 20:19

## 2020-09-28 RX ADMIN — GABAPENTIN 100 MG: 100 CAPSULE ORAL at 08:33

## 2020-09-28 RX ADMIN — IPRATROPIUM BROMIDE 2 PUFF: 17 AEROSOL, METERED RESPIRATORY (INHALATION) at 20:20

## 2020-09-28 RX ADMIN — GABAPENTIN 100 MG: 100 CAPSULE ORAL at 14:29

## 2020-09-28 RX ADMIN — IPRATROPIUM BROMIDE 2 PUFF: 17 AEROSOL, METERED RESPIRATORY (INHALATION) at 18:28

## 2020-09-28 RX ADMIN — IPRATROPIUM BROMIDE 2 PUFF: 17 AEROSOL, METERED RESPIRATORY (INHALATION) at 08:35

## 2020-09-28 RX ADMIN — ESCITALOPRAM OXALATE 20 MG: 10 TABLET ORAL at 08:33

## 2020-09-28 RX ADMIN — ALBUTEROL SULFATE 2 PUFF: 90 AEROSOL, METERED RESPIRATORY (INHALATION) at 18:28

## 2020-09-28 ASSESSMENT — PAIN SCALES - GENERAL
PAINLEVEL_OUTOF10: 0

## 2020-09-28 NOTE — CARE COORDINATION
CM outreached [facility] if ascertain if order for RW received for patient at discharge. Referral received, pt d/c on hold for today ? for tomorrow. Per note, INR not at goal.  Discharge to home with home health when INR is > 2.0.  Continue enoxaparin bridge until then.   Electronically signed by Louisa Barros RN on 9/28/2020 at 4:49 PM

## 2020-09-28 NOTE — PROGRESS NOTES
[place], [place], [address]        DEPARTMENT OF HOSPITALIST MEDICINE        PROGRESS  NOTE:    NOTE: Portions of this note have been copied forward, however, changed to reflect the most current clinical status of this patient. Patient: Belinda Skiff  YOB: 1950  Date of Service: 9/27/2020  MRN: [id number]   Acct: [de-identified]   Primary Care Physician: Hector Gould MD  Advance Directive: Full Code  Admit Date: 9/23/2020       Hospital Day: 4      CHIEF COMPLAINT:  Chief Complaint   Patient presents with    Fatigue     decreased PO intake, has not taken home medications in over 2 weeks    Flank Pain     left sided       [address]:    9/27/2020  Patient is feeling tired, but able to walk in the room without requiring oxygen. Are still subtherapeutic at 1.29. Creatinine improved to 1.4 overnight with gentle IV hydration. 9/26/2020  Patient is feeling weak and tired. Her INR is still subtherapeutic at 1.19. Her potassium was replaced and is normal at 4.7. Creatinine initially showed improvement but is now up at 1.6.    9/25/2020  Patient feels tired and fatigued. Getting out of bed and walking in the room without any desaturations. He was initially planned to be discharged which was held because patient's INR is subtherapeutic.    9/24/2020  Patient feels tired and fatigued. Not using any oxygen. She is holding her O2 saturations and dexamethasone was discontinued. We could consider patient discharge if she remains stable over 24 hours. 9/23/2020  HISTORY OF PRESENT ILLNESS:  69year old black female presents to the emergency department with complaint of generalized weakness and fatigue since fall from bed 3 weeks ago. She was seen at her primary care provider's office yesterday and underwent imaging, was directed to come to the hospital after review.  She denies any significant shortness of breath, fever, chills, or cough above her baseline (Restrictive lung disease). She does have a coworker at [employer] who tested positive for COVID-19. Patient is unsure why she fell initially and states she was well prior, but was too weak to get off the floor. She is maintaining saturation on room air. No treatment prior to arrival aside from home medications. Patient is fatigued and forgetful, is a poor historian.       REVIEW  OF  SYSTEMS:    Constitutional:  No fevers, chills, nausea, vomiting, + tiredness and fatigue   Lungs:   No hemoptysis, pleurisy, + SOB   Heart:  No chest pressure with exertion, no palpitations,    Abdomen:   No new masses, no bright red blood per rectum   Extremities: +difficulty ambulating due  to weakness, no new lesions   Neurologic: No new motor or sensory changes       PAST MEDICAL HISTORY:  Past Medical History:   Diagnosis Date    Abdominal pain     Abnormal EKG     Acute sinusitis     Allergic reaction to spider bite     Anemia     Anticoagulated     Anxiety     Arrhythmia     Asthmatic bronchitis without complication 7/62/7381    Ataxic gait     Lyons's esophagus     Bowel obstruction (HCC)     Callus     Cardiac pacemaker     CKD (chronic kidney disease) stage 2, GFR 60-89 ml/min     Coat's syndrome     Coat's syndrome     both eyes    Depression     Diabetes mellitus type 2 in nonobese ([facility] [address]     Diabetic nephropathy (HCC)     Disequilibrium     Dizziness     DVT (deep venous thrombosis) (HCC)     Exudative retinopathy     Fibromyalgia     Fibromyositis     Gastric ulcer     GERD (gastroesophageal reflux disease)     History of gastric bypass     Hx of lupus anticoagulant disorder     Hypertension     Hypothyroidism     Intermittent claudication (HCC)     Intestinal obstruction (HCC)     Iron deficiency     Left-sided weakness     Low vitamin D level     Lupus (HCC)     Menopause     Obesity     Osteoarthritis     Osteoporosis     Other iron deficiency anemias     Palliative care patient 09/24/2020 Daily    lisinopril  40 mg Oral Daily    therapeutic multivitamin-minerals  1 tablet Oral Daily    pantoprazole  40 mg Oral BID AC    [Held by provider] potassium chloride  10 mEq Oral Daily    sodium chloride flush  10 mL Intravenous 2 times per day    enoxaparin  1 mg/kg Subcutaneous BID    albuterol sulfate HFA  2 puff Inhalation 4x daily    And    ipratropium  2 puff Inhalation 4x daily    insulin lispro  0-12 Units Subcutaneous TID WC    insulin lispro  0-6 Units Subcutaneous Nightly    ferrous gluconate  324 mg Oral BID        PRN:  potassium chloride, 40 mEq, PRN    Or  potassium alternative oral replacement, 40 mEq, PRN    Or  potassium chloride, 10 mEq, PRN  magnesium sulfate, 1 g, PRN  baclofen, 10 mg, TID PRN  cloNIDine, 0.1 mg, TID PRN  sodium chloride flush, 10 mL, PRN  acetaminophen, 650 mg, Q6H PRN    Or  acetaminophen, 650 mg, Q6H PRN  polyethylene glycol, 17 g, Daily PRN  promethazine, 12.5 mg, Q6H PRN    Or  ondansetron, 4 mg, Q6H PRN  glucose, 15 g, PRN  dextrose, 12.5 g, PRN  glucagon (rDNA), 1 mg, PRN  dextrose, 100 mL/hr, PRN        Infusions:   sodium chloride 76.92 mL/hr at 09/27/20 1002    dextrose         Laboratory Data:  Recent Labs     09/25/20 0245 09/26/20 0452 09/27/20  0600   WBC 6.0 10.2 12.2*   HGB 10.9* 11.1* 10.7*    258 323     Recent Labs     09/25/20 0245 09/26/20 0452 09/27/20  0600    139 135*   K 3.4* 4.7 4.0   CL 97* 104 101   CO2 27 20* 20*   BUN 22 35* 30*   CREATININE 0.5 1.6* 1.4*   GLUCOSE 90 95 104     No results for input(s): AST, ALT, ALB, BILITOT, ALKPHOS in the last 72 hours. Troponin T:   No results for input(s): TROPONINI in the last 72 hours. Pro-BNP: No results for input(s): BNP in the last 72 hours.   INR:   Recent Labs     09/25/20 0245 09/26/20 0452 09/27/20  0600   INR 1.46* 1.19* 1.29*     UA:  No results for input(s): NITRITE, COLORU, PHUR, LABCAST, WBCUA, RBCUA, MUCUS, TRICHOMONAS, YEAST, BACTERIA, CLARITYU, SPECGRAV, LEUKOCYTESUR, UROBILINOGEN, BILIRUBINUR, BLOODU, GLUCOSEU, AMORPHOUS in the last 72 hours. Invalid input(s): Sudha Lee  A1C: No results for input(s): LABA1C in the last 72 hours. ABG:  No results for input(s): PHART, VUD6YOF, PO2ART, JDZ3ATL, BEART, HGBAE, P3UFFKUX, CARBOXHGBART in the last 72 hours. Imaging:    Ct Head Wo Contrast    Result Date: 9/23/2020  1. No acute intracranial process. Signed by Dr Ava Calvillo on 9/23/2020 4:11 PM    Ct Cervical Spine Wo Contrast    Result Date: 9/23/2020  1. No evidence of acute osseous injury in the cervical spine. Signed by Dr Ava Calvillo on 9/23/2020 4:09 PM    Ct Thoracic Spine Wo Contrast    Result Date: 9/23/2020  1. No evidence of acute osseous injury in the thoracic spine. 2. Bilateral multifocal lung infiltrates, most notably in the lung bases. Upper lobe infiltrates are rounded and groundglass, which raises suspicion for atypical pneumonia such as Covid 19. However, the bilateral lung bases appear more consolidated with earlier bronchograms. I would include pulmonary edema in the differential as well given the extent of the consolidation. The results of this exam were discussed with Dr. Charleen Mcfarlane on 9/23/2020 at 1617 hours Signed by Dr Ava Calvillo on 9/23/2020 4:17 PM    Ct Lumbar Spine Wo Contrast    Result Date: 9/23/2020  1. No evidence of acute injury in the osseous lumbar spine. Signed by Dr Ava Calvillo on 9/23/2020 4:19 PM    Ct Abdomen Pelvis W Iv Contrast Additional Contrast? None    Result Date: 9/23/2020  1. No acute process in the abdomen or pelvis. 2. Bibasilar consolidative lung infiltrates which appear to be conforming to more deep tendon distribution. Pulmonary edema certainly a consideration. Atypical pneumonia is also a consideration. 3. No evidence of bowel obstruction or viscus perforation. No peritoneal abscess. 4. No urolithiasis and/or hydronephrosis. 5. Prior gastric bypass. 6. Prior ventral hernia repair. No recurrent hernia.  7. IVC filter. Signed by Dr Bertha Dumont on 9/23/2020 4:26 PM    Xr Chest Portable    Result Date: 9/23/2020  1. Shallow inspiration with mild bibasilar atelectasis. 2. Underlying cardiomegaly. No interstitial or jose carlos pulmonary edema. Signed by Dr Bertha Dumont on 9/23/2020 2:59 PM       ASSESSMENT & PLAN:    Principal Problem:    COVID-19  Active Problems:    Iron deficiency anemia    Restrictive airway disease    DVT, lower extremity, proximal, acute, unspecified laterality ([address]    Type II diabetes mellitus with nephropathy ([address]    Generalized weakness    Accidental fall from bed    Palliative care patient  Resolved Problems:    * No resolved hospital problems. *      Continue with current medications  Continue with COVID-19 aspiratory plus precautions  Patient does not require any oxygen hence we will DC dexamethasone  Continue diabetic meds  Accu-Cheks qAC and qHS ordered with low-dose insulin coverage as per protocol  Continue with the p.o. Levaquin . .. Day #5/5  Patient's INR is still subtherapeutic at 1.29  Patient will be given Coumadin 15 mg p.o. dose today   Follow-up closely with pharmacy for Coumadin management to keep INR between 2 and 3  Continue to hold off on p.o. Bumex  Hold off on oral calcium replacement  Patient started on another 1000 cc normal saline drip IV over 13 hours  Strict I's and O's  Monitor electrolytes and renal functions in the morning      Repeat labs in a.m. Electrolyte replacement as per protocol. Patient will be monitored very closely on the floor. Further recommendations as per the hospital course. The patient's management will be taken over by my covering Hospitalist, Dr. Red Thao, in a.m . .. I am signing off!         Discharge Plan: DC planning home with home health care once patient INR is therapeutic      Case management consult for safe DC planning of the patient with appropriate self-quarantine at home to ensure patient does not become a cause for community spread of COVID-19. Patient needs to be established with health department prior to discharge. Patient  is on DVT prophylaxis  Current medications reviewed  Lab work reviewed  Radiology/Chest x-ray films reviewed  Treatment recommendations from suspecialities reviewed, appreciated and agreed with  Discussed with the nurse and addressed all questions/concerns      Caleb Guerrero MD  9/27/2020 8:23 PM      DISCLAIMER: This note was created with electronic voice recognition which does have occasional errors. If you have any questions regarding the content within the note please do not hesitate to contact me. .. Thanks.

## 2020-09-28 NOTE — PLAN OF CARE
Problem: Airway Clearance - Ineffective  Goal: Achieve or maintain patent airway  Outcome: Ongoing     Problem: Gas Exchange - Impaired  Goal: Absence of hypoxia  Outcome: Ongoing  Goal: Promote optimal lung function  Outcome: Ongoing     Problem: Breathing Pattern - Ineffective  Goal: Ability to achieve and maintain a regular respiratory rate  Outcome: Ongoing     Problem:  Body Temperature -  Risk of, Imbalanced  Goal: Ability to maintain a body temperature within defined limits  Outcome: Ongoing  Goal: Will regain or maintain usual level of consciousness  Outcome: Ongoing  Goal: Complications related to the disease process, condition or treatment will be avoided or minimized  Outcome: Ongoing

## 2020-09-28 NOTE — CARE COORDINATION
SW called pt and asked if pt would be interested in going to a SNF. Pt stated no she will be going home. SW asked pt if she would be interested in [facility], pt stated yes. HH order was entered on Friday.

## 2020-09-28 NOTE — CARE COORDINATION
2nd [facility] referral received. Patient was set up with [facility] on Friday.   Electronically signed by Garo Uribe RN on 9/28/20 at 1:00 PM CDT

## 2020-09-28 NOTE — CARE COORDINATION
CSN                                    Req/Control # [Problem retrieving Specimen ID]                                   Order Date:  Sep 28, 2020  [id number]                                          Patient Information      Name:  Belinda Skiff  :  1950  Age:  69 y.o. Address:  [address] [place] [address], [facility] [address]   Zip:  [address]  PCP: Hector Gould MD Sex:  F  SSN: [id number]  Home Phone: [phone]  Work Phone:  [phone]  Patient MRN:  [id number]    Alt Patient ID:  [id number]  PCP Phone: [phone]       Authorizing Provider Information       AUTHORIZING PROVIDER: Juan Francois DO  Physician ID: [id number]  NPI:  [id number]  Site:   Address: [address]  [address] [place] [address]  Phone: [phone]  Fax: [phone]               EQUIPMENT ORDERED  DME Order for Abdulkadir Sadler (ORD   #:   [id number]) Priority  Routine Class  Hospital Performed        Associated Diagnosis:          Comments:    You must complete the order parameters below and add the medical necessity documentation for this DME in a separate note.     Folding Walker with Wheels and Seat     Current patient weight: Weight: 222 lb 6.4 oz (100.9 kg)  Current patient height: Height: 5' 6\" (167.6 cm)  Diagnosis: ambulatory dysfunction, fall, generalized weakness  Duration: Purchase            Scheduling Instructions:                                 Specimen Source             Collection Date    Collection Time    Order Status    Expected Date                Electronically Signed By  Juan Francois DO Date  Sep 28, 2020           Responsible Party Hardeep Pagan-ActID   Relationship Account Type Home Phone   Karla Min [phone] [facility], [address] Self P/F [phone]   Employer   Work [employer] [facility] [employer] [facility] [employer] [facility] [place], [address] [facility]     Primary Insurance  Insurance/Subscriber ID: Status    Compa Bronson, DO Compa Bronson,   Emergency  Admission (Confirmed)            Admission Date/Time  Discharge Date  Hospital Service  Auth/Cert Status    00/74/08  02:18 PM   Internal Medicine  Incomplete    [address] [facility]  Unit  Room/Bed     [facility]  [id number]  Pending sale to [facility] Account     Name  Acct ID  Class  Status  Primary Coverage    Srini Enciso  [id number]  Inpatient  Open  MEDICARE - MEDICARE PART A AND B            Guarantor Account (for Hospital Account [de-identified])     Name  Relation to Pt  Service Area  Active? Acct Type    Srini Enciso  Self  [address]  Yes  Personal/Family    Address  Phone      [address] [facility], [address]  [phone](G)   [phone](K)              Coverage Information (for Hospital Account [de-identified])     1. [facility] PART A AND B     F/O Payor/Plan  Precert #    MEDICARE/MEDICARE PART A AND B     Subscriber  Subscriber #    Srini Enciso  [id number]    Address  Phone    [address] [facility] [address] [place], [place] [address] [phone]    2.  COLONIAL KAYLA/COLONIAL KAYLA MEDICARE SUPP     F/O Payor/Plan  Precert #    COLONIAL KAYLA/COLONIAL KAYLA MEDICARE SUPP     Subscriber  Subscriber #    Srini Enciso  [id number]    Address  Phone    [address]   [place], [address]

## 2020-09-28 NOTE — CARE COORDINATION
SW placed call to pts room and asked if pt had 24/7 care because she denied SNF. Pt stated that her daughter will be at her house 24/7 to provide care. SW asked pt if she needed any equipment, pt stated that she needed a walker. SW called pts nurse and asked her to place order. Attempted to call daughter, Ximena Long, to make sure the pt will have 24/7 care and if she will be coming to  daughter today, Ximena Alves didn't answer. Will try again later.

## 2020-09-28 NOTE — PROGRESS NOTES
Hospitalist Progress Note  9/28/2020 12:31 PM  Subjective:   Admit Date: 9/23/2020  PCP: Perico Starkey MD    Chief Complaint: weakness, fatigue    Subjective: Patient states she is feeling well without any new complaints. She adamantly refuses placement at SNF. Denies any bleeding, INR not yet therapeutic. History is otherwise unchanged. Cumulative Hospital History:   [copied from previous provider]  9/23/2020  69year old black female presents to the emergency department with complaint of generalized weakness and fatigue since fall from bed 3 weeks ago. She was seen at her primary care provider's office yesterday and underwent imaging, was directed to come to the hospital after review. She denies any significant shortness of breath, fever, chills, or cough above her baseline (Restrictive lung disease). She does have a coworker at [employer] who tested positive for COVID-19. Patient is unsure why she fell initially and states she was well prior, but was too weak to get off the floor. She is maintaining saturation on room air. No treatment prior to arrival aside from home medications. Patient is fatigued and forgetful, is a poor historian. 9/24/2020  Patient feels tired and fatigued.  Not using any oxygen.  She is holding her O2 saturations and dexamethasone was discontinued.  We could consider patient discharge if she remains stable over 24 hours. 9/25/2020  Patient feels tired and fatigued. Getting out of bed and walking in the room without any desaturations. He was initially planned to be discharged which was held because patient's INR is subtherapeutic.  9/26/2020  Patient is feeling weak and tired. Her INR is still subtherapeutic at 1.19. Her potassium was replaced and is normal at 4.7. Creatinine initially showed improvement but is now up at 1.6.  9/27/2020  Patient is feeling tired, but able to walk in the room without requiring oxygen. Are still subtherapeutic at 1.29.   Creatinine improved to 1.4 overnight with gentle IV hydration. [end copied portion]  9-28: Patient is feeling well and is eager for discharge. She adamantly refuses SNF placement, will discharge to home with home health only. INR still low at 1.62 so discharge will be held until at least tomorrow. ROS: 14 point review of systems is negative except as specifically addressed above.     DIET CARB CONTROL; Safety Tray; Safety Tray (Disposables)    Intake/Output Summary (Last 24 hours) at 9/28/2020 1231  Last data filed at 9/28/2020 0900  Gross per 24 hour   Intake 932.8 ml   Output 1600 ml   Net -667.2 ml     Medications:   dextrose       Current Facility-Administered Medications   Medication Dose Route Frequency Provider Last Rate Last Dose    potassium chloride (KLOR-CON M) extended release tablet 40 mEq  40 mEq Oral PRN Pierce Malcolm MD   40 mEq at 09/25/20 9532    Or    potassium bicarb-citric acid (EFFER-K) effervescent tablet 40 mEq  40 mEq Oral PRN Pierce Malcolm MD        Or    potassium chloride 10 mEq/100 mL IVPB (Peripheral Line)  10 mEq Intravenous PRN Pierce Malcolm MD        magnesium sulfate 1 g in dextrose 5% 100 mL IVPB  1 g Intravenous PRN Pierce Malcolm MD        warfarin (COUMADIN) daily dosing (placeholder)   Other RX Placeholder Caleb Guerrero MD        baclofen (LIORESAL) tablet 10 mg  10 mg Oral TID PRN Alpa French, DO        [Held by provider] bumetanide (BUMEX) tablet 2 mg  2 mg Oral Daily Alpa French, DO   Stopped at 09/28/20 0900    cloNIDine (CATAPRES) tablet 0.1 mg  0.1 mg Oral TID PRN Alpa Morrisseyon, DO        escitalopram (LEXAPRO) tablet 20 mg  20 mg Oral Daily Luquillo Thacker, DO   20 mg at 09/28/20 7823    gabapentin (NEURONTIN) capsule 100 mg  100 mg Oral TID Alpa Morrisseyon, DO   100 mg at 09/28/20 9808    levothyroxine (SYNTHROID) tablet 100 mcg  100 mcg Oral Daily [facility], DO   100 mcg at 09/28/20 0502    lisinopril (PRINIVIL;ZESTRIL) tablet 40 mg  40 mg Oral Daily Luquillo Thacker, DO   40 mg at 09/28/20 1395    gluconate 324 (37.5 Fe) MG tablet 324 mg  324 mg Oral BID Fabián Cerda MD   324 mg at 09/28/20 4765        Labs:     Recent Labs     09/26/20  0452 09/27/20  0600 09/28/20  1120   WBC 10.2 12.2* 10.9*   RBC 3.79* 3.70* 3.51*   HGB 11.1* 10.7* 10.1*   HCT 33.8* 32.4* 31.4*   MCV 89.2 87.6 89.5   MCH 29.3 28.9 28.8   MCHC 32.8* 33.0 32.2*    323 373     Recent Labs     09/26/20  0452 09/27/20  0600 09/28/20  1120    135* 136   K 4.7 4.0 4.0   ANIONGAP 15 14 11    101 105   CO2 20* 20* 20*   BUN 35* 30* 27*   CREATININE 1.6* 1.4* 1.5*   GLUCOSE 95 104 110*   CALCIUM 8.8 8.5* 8.8     No results for input(s): MG, PHOS in the last 72 hours. No results for input(s): AST, ALT, ALB, BILITOT, ALKPHOS, ALB in the last 72 hours. ABGs:No results for input(s): PH, PO2, PCO2, HCO3, BE, O2SAT in the last 72 hours. Troponin T: No results for input(s): TROPONINI in the last 72 hours. INR:   Recent Labs     09/26/20 0452 09/27/20 0600 09/28/20  1120   INR 1.19* 1.29* 1.62*     Lactic Acid: No results for input(s): LACTA in the last 72 hours. Objective:   Vitals: /60   Pulse 76   Temp 97.8 °F (36.6 °C) (Axillary)   Resp 16   Ht 5' 6\" (1.676 m)   Wt 222 lb 6.4 oz (100.9 kg)   SpO2 94%   BMI 35.90 kg/m²   24HR INTAKE/OUTPUT:      Intake/Output Summary (Last 24 hours) at 9/28/2020 1231  Last data filed at 9/28/2020 0900  Gross per 24 hour   Intake 932.8 ml   Output 1600 ml   Net -667.2 ml     Examined remotely to conserve PPE. Auscultatory findings based on nursing findings.   General appearance: alert and cooperative with exam  HEENT: atraumatic, eyes with clear conjunctiva and normal lids, pupils and irises normal, external ears and nose are normal, lips normal  Neck without masses, lympadenopathy, bruit, thyroid normal  Lungs: no increased work of breathing, \"clear to auscultation bilaterally\" without rales, rhonchi or wheezes  Heart: regular rate and rhythm, S1, S2 normal, no murmur, click, rub or gallop  Abdomen: soft, non-tender; bowel sounds normal; no masses,  no organomegaly  Extremities: extremities normal, atraumatic, no cyanosis or edema  Neurologic: no focal neurologic deficits, normal sensation, alert and oriented, affect and mood appropriate  Skin: no rashes, nodules    Assessment and Plan:   Principal Problem:    COVID-19  Active Problems:    Iron deficiency anemia    Restrictive airway disease    DVT, lower extremity, proximal, acute, unspecified laterality (HCC)    Type II diabetes mellitus with nephropathy (HCC)    Generalized weakness    Accidental fall from bed    Palliative care patient  Resolved Problems:    * No resolved hospital problems. *      Day #5 levofloxacin empiric therapy for possible underlying bacterial pneumonia. I see no evidence of this, will discontinue. Not requiring supplemental oxygen. Received one dose of dexamethasone in ED, no other treatment for COVID-19 indicated. Discharge to home with home health when INR is > 2.0. Continue enoxaparin bridge until then.     Advance Directive: Full Code    DVT prophylaxis: enoxaparin bridging to warfarin    Discharge planning: to home with home health when INR is therapeutic      DO Lyla Salcido Hospitalist

## 2020-09-28 NOTE — PROGRESS NOTES
Physical Therapy  Name: Braulio Mendez  MRN:  [id number]  Date of service:  9/28/2020 09/28/20 1536   Subjective   Subjective Pt agrees to work with therapy    General Comment   Comments nurse states pt has been exercising   Bed Mobility   Supine to Sit Modified independent   Sit to Supine Stand by assistance   Comment BSS 10min   Transfers   Sit to Stand Contact guard assistance   Stand to sit Contact guard assistance   Comment Standing in place, pt stating it feels great to stand. Ambulation   Ambulation? No   Exercises   Quad Sets 10x   Heelslides 10x   Hip Abduction 10x   Knee Short Arc Quad 10x   Ankle Pumps 20x   Short term goals   Time Frame for Short term goals 2 wks   Short term goal 1 supine to sit indep   Short term goal 2 sit to stand indep   Short term goal 3 amb. 48' with RW SBA   Conditions Requiring Skilled Therapeutic Intervention   Body structures, Functions, Activity limitations Decreased functional mobility ; Decreased ROM; Decreased strength;Decreased sensation;Decreased endurance;Decreased balance; Increased pain;Decreased posture   Assessment Pt worked well with therapy today. Activity Tolerance   Activity Tolerance Patient limited by endurance; Patient Tolerated treatment well   Safety Devices   Type of devices Left in bed;Call light within reach       Electronically signed by Carlene Stein PTA on 9/28/2020 at 3:44 PM

## 2020-09-28 NOTE — PROGRESS NOTES
Clinical Pharmacy Note    Warfarin consult follow-up    Recent Labs     09/28/20  1120   INR 1.62*     Recent Labs     09/26/20  0452 09/27/20  0600 09/28/20  1120   HGB 11.1* 10.7* 10.1*   HCT 33.8* 32.4* 31.4*    323 373     Significant Drug-Drug Interactions:  New warfarin drug-drug interactions: None  Discontinued drug-drug interactions: Levaquin    Date INR Warfarin Dose    09/25/20 1.46 10 mg    09/26/20  1.19   15 mg      09/27/20  1.29  15 mg     09/28/20 1.62  12.5 mg                              Notes: Give Coumadin 12.5 mg by mouth x 1 dose tonight. Daily PT/INR until stable within therapeutic range.      Electronically signed by Reji DumasD, BCPS on 9/28/2020 at 1:42 PM

## 2020-09-28 NOTE — CARE COORDINATION
SW called pts daughter, Fior Shields. SONDRA addressed that the pt will need 24/7 care. SONDRA stated that because the pt denied a SNF we needed to make sure that the pt would have the 24 hour care that she needs. Fior Shields stated that she will be there all day to take care of her and if not her  will be.

## 2020-09-29 LAB
ANION GAP SERPL CALCULATED.3IONS-SCNC: 10 MMOL/L (ref 7–19)
APTT: 57 SEC (ref 26–36.2)
BASOPHILS ABSOLUTE: 0.1 K/UL (ref 0–0.2)
BASOPHILS RELATIVE PERCENT: 0.7 % (ref 0–1)
BUN BLDV-MCNC: 26 MG/DL (ref 8–23)
CALCIUM SERPL-MCNC: 8.4 MG/DL (ref 8.8–10.2)
CHLORIDE BLD-SCNC: 107 MMOL/L (ref 98–111)
CO2: 20 MMOL/L (ref 22–29)
CREAT SERPL-MCNC: 1.3 MG/DL (ref 0.5–0.9)
D DIMER: 3.76 UG/ML FEU (ref 0–0.48)
EOSINOPHILS ABSOLUTE: 0.1 K/UL (ref 0–0.6)
EOSINOPHILS RELATIVE PERCENT: 1.2 % (ref 0–5)
FIBRINOGEN: 433 MG/DL (ref 238–505)
GFR AFRICAN AMERICAN: 49
GFR NON-AFRICAN AMERICAN: 40
GLUCOSE BLD-MCNC: 100 MG/DL (ref 70–99)
GLUCOSE BLD-MCNC: 101 MG/DL (ref 70–99)
GLUCOSE BLD-MCNC: 101 MG/DL (ref 70–99)
GLUCOSE BLD-MCNC: 93 MG/DL (ref 74–109)
GLUCOSE BLD-MCNC: 96 MG/DL (ref 70–99)
HCT VFR BLD CALC: 29.8 % (ref 37–47)
HEMOGLOBIN: 9.7 G/DL (ref 12–16)
IMMATURE GRANULOCYTES #: 1.1 K/UL
INR BLD: 1.62 (ref 0.88–1.18)
LYMPHOCYTES ABSOLUTE: 2.3 K/UL (ref 1.1–4.5)
LYMPHOCYTES RELATIVE PERCENT: 22.9 % (ref 20–40)
MCH RBC QN AUTO: 29.1 PG (ref 27–31)
MCHC RBC AUTO-ENTMCNC: 32.6 G/DL (ref 33–37)
MCV RBC AUTO: 89.5 FL (ref 81–99)
MONOCYTES ABSOLUTE: 1.4 K/UL (ref 0–0.9)
MONOCYTES RELATIVE PERCENT: 13.7 % (ref 0–10)
NEUTROPHILS ABSOLUTE: 5.1 K/UL (ref 1.5–7.5)
NEUTROPHILS RELATIVE PERCENT: 51.1 % (ref 50–65)
PDW BLD-RTO: 15.2 % (ref 11.5–14.5)
PERFORMED ON: ABNORMAL
PERFORMED ON: NORMAL
PLATELET # BLD: 395 K/UL (ref 130–400)
PMV BLD AUTO: 10.4 FL (ref 9.4–12.3)
POTASSIUM SERPL-SCNC: 4.1 MMOL/L (ref 3.5–5)
PROTHROMBIN TIME: 19.4 SEC (ref 12–14.6)
RBC # BLD: 3.33 M/UL (ref 4.2–5.4)
SODIUM BLD-SCNC: 137 MMOL/L (ref 136–145)
WBC # BLD: 10.1 K/UL (ref 4.8–10.8)

## 2020-09-29 PROCEDURE — 85379 FIBRIN DEGRADATION QUANT: CPT

## 2020-09-29 PROCEDURE — 85384 FIBRINOGEN ACTIVITY: CPT

## 2020-09-29 PROCEDURE — 2580000003 HC RX 258: Performed by: FAMILY MEDICINE

## 2020-09-29 PROCEDURE — 85610 PROTHROMBIN TIME: CPT

## 2020-09-29 PROCEDURE — 6370000000 HC RX 637 (ALT 250 FOR IP): Performed by: FAMILY MEDICINE

## 2020-09-29 PROCEDURE — 2100000000 HC CCU R&B

## 2020-09-29 PROCEDURE — 85025 COMPLETE CBC W/AUTO DIFF WBC: CPT

## 2020-09-29 PROCEDURE — 6370000000 HC RX 637 (ALT 250 FOR IP): Performed by: HOSPITALIST

## 2020-09-29 PROCEDURE — 97110 THERAPEUTIC EXERCISES: CPT

## 2020-09-29 PROCEDURE — 97116 GAIT TRAINING THERAPY: CPT

## 2020-09-29 PROCEDURE — 80048 BASIC METABOLIC PNL TOTAL CA: CPT

## 2020-09-29 PROCEDURE — 85730 THROMBOPLASTIN TIME PARTIAL: CPT

## 2020-09-29 PROCEDURE — 6360000002 HC RX W HCPCS: Performed by: FAMILY MEDICINE

## 2020-09-29 PROCEDURE — 82947 ASSAY GLUCOSE BLOOD QUANT: CPT

## 2020-09-29 RX ORDER — WARFARIN SODIUM 5 MG/1
15 TABLET ORAL
Status: COMPLETED | OUTPATIENT
Start: 2020-09-29 | End: 2020-09-29

## 2020-09-29 RX ADMIN — Medication 10 ML: at 07:55

## 2020-09-29 RX ADMIN — GABAPENTIN 100 MG: 100 CAPSULE ORAL at 14:29

## 2020-09-29 RX ADMIN — ALBUTEROL SULFATE 2 PUFF: 90 AEROSOL, METERED RESPIRATORY (INHALATION) at 16:07

## 2020-09-29 RX ADMIN — GABAPENTIN 100 MG: 100 CAPSULE ORAL at 20:22

## 2020-09-29 RX ADMIN — IPRATROPIUM BROMIDE 2 PUFF: 17 AEROSOL, METERED RESPIRATORY (INHALATION) at 16:07

## 2020-09-29 RX ADMIN — ENOXAPARIN SODIUM 100 MG: 100 INJECTION SUBCUTANEOUS at 07:55

## 2020-09-29 RX ADMIN — GABAPENTIN 100 MG: 100 CAPSULE ORAL at 07:54

## 2020-09-29 RX ADMIN — IPRATROPIUM BROMIDE 2 PUFF: 17 AEROSOL, METERED RESPIRATORY (INHALATION) at 20:22

## 2020-09-29 RX ADMIN — IPRATROPIUM BROMIDE 2 PUFF: 17 AEROSOL, METERED RESPIRATORY (INHALATION) at 13:54

## 2020-09-29 RX ADMIN — LEVOTHYROXINE SODIUM 100 MCG: 100 TABLET ORAL at 07:54

## 2020-09-29 RX ADMIN — ENOXAPARIN SODIUM 100 MG: 100 INJECTION SUBCUTANEOUS at 20:21

## 2020-09-29 RX ADMIN — ALBUTEROL SULFATE 2 PUFF: 90 AEROSOL, METERED RESPIRATORY (INHALATION) at 07:55

## 2020-09-29 RX ADMIN — IPRATROPIUM BROMIDE 2 PUFF: 17 AEROSOL, METERED RESPIRATORY (INHALATION) at 07:55

## 2020-09-29 RX ADMIN — PANTOPRAZOLE SODIUM 40 MG: 40 TABLET, DELAYED RELEASE ORAL at 07:54

## 2020-09-29 RX ADMIN — MULTIPLE VITAMINS W/ MINERALS TAB 1 TABLET: TAB at 07:54

## 2020-09-29 RX ADMIN — Medication 10 ML: at 20:22

## 2020-09-29 RX ADMIN — ALBUTEROL SULFATE 2 PUFF: 90 AEROSOL, METERED RESPIRATORY (INHALATION) at 20:22

## 2020-09-29 RX ADMIN — ESCITALOPRAM OXALATE 20 MG: 10 TABLET ORAL at 07:54

## 2020-09-29 RX ADMIN — FERROUS GLUCONATE TAB 324 MG (37.5 MG ELEMENTAL IRON) 324 MG: 324 (37.5 FE) TAB at 07:54

## 2020-09-29 RX ADMIN — FERROUS GLUCONATE TAB 324 MG (37.5 MG ELEMENTAL IRON) 324 MG: 324 (37.5 FE) TAB at 20:21

## 2020-09-29 RX ADMIN — LISINOPRIL 40 MG: 20 TABLET ORAL at 07:54

## 2020-09-29 RX ADMIN — PANTOPRAZOLE SODIUM 40 MG: 40 TABLET, DELAYED RELEASE ORAL at 16:07

## 2020-09-29 RX ADMIN — ALBUTEROL SULFATE 2 PUFF: 90 AEROSOL, METERED RESPIRATORY (INHALATION) at 13:54

## 2020-09-29 RX ADMIN — WARFARIN SODIUM 15 MG: 5 TABLET ORAL at 16:42

## 2020-09-29 ASSESSMENT — PAIN SCALES - GENERAL: PAINLEVEL_OUTOF10: 0

## 2020-09-29 ASSESSMENT — PAIN - FUNCTIONAL ASSESSMENT: PAIN_FUNCTIONAL_ASSESSMENT: ACTIVITIES ARE NOT PREVENTED

## 2020-09-29 NOTE — PROGRESS NOTES
Hospitalist Progress Note  9/29/2020 3:38 PM  Subjective:   Admit Date: 9/23/2020  PCP: Katarzyna Allen MD    Chief Complaint: weakness, fatigue    Subjective: Patient without new complaints. Denies any bleeding, INR not yet therapeutic. History is otherwise unchanged. Cumulative Hospital History:   [copied from previous provider]  9/23/2020  69year old black female presents to the emergency department with complaint of generalized weakness and fatigue since fall from bed 3 weeks ago. She was seen at her primary care provider's office yesterday and underwent imaging, was directed to come to the hospital after review. She denies any significant shortness of breath, fever, chills, or cough above her baseline (Restrictive lung disease). She does have a coworker at [facility] who tested positive for COVID-19. Patient is unsure why she fell initially and states she was well prior, but was too weak to get off the floor. She is maintaining saturation on room air. No treatment prior to arrival aside from home medications. Patient is fatigued and forgetful, is a poor historian. 9/24/2020  Patient feels tired and fatigued.  Not using any oxygen.  She is holding her O2 saturations and dexamethasone was discontinued.  We could consider patient discharge if she remains stable over 24 hours. 9/25/2020  Patient feels tired and fatigued. Getting out of bed and walking in the room without any desaturations. He was initially planned to be discharged which was held because patient's INR is subtherapeutic.  9/26/2020  Patient is feeling weak and tired. Her INR is still subtherapeutic at 1.19. Her potassium was replaced and is normal at 4.7. Creatinine initially showed improvement but is now up at 1.6.  9/27/2020  Patient is feeling tired, but able to walk in the room without requiring oxygen. Are still subtherapeutic at 1.29. Creatinine improved to 1.4 overnight with gentle IV hydration.   [end copied portion]  9-28: Patient is feeling well and is eager for discharge. She adamantly refuses SNF placement, will discharge to home with home health only. INR still low at 1.62 so discharge will be held until at least tomorrow. 9-29: INR still 1.62, hold and discharge when > 2.00. ROS: 14 point review of systems is negative except as specifically addressed above.     DIET CARB CONTROL; Safety Tray; Safety Tray (Disposables)    Intake/Output Summary (Last 24 hours) at 9/29/2020 1538  Last data filed at 9/29/2020 1200  Gross per 24 hour   Intake 600 ml   Output 1200 ml   Net -600 ml     Medications:   dextrose       Current Facility-Administered Medications   Medication Dose Route Frequency Provider Last Rate Last Dose    potassium chloride (KLOR-CON M) extended release tablet 40 mEq  40 mEq Oral PRN Roxie Ricci MD   40 mEq at 09/25/20 5779    Or    potassium bicarb-citric acid (EFFER-K) effervescent tablet 40 mEq  40 mEq Oral PRN Roxie Ricci MD        Or    potassium chloride 10 mEq/100 mL IVPB (Peripheral Line)  10 mEq Intravenous PRN Roxie Ricci MD        magnesium sulfate 1 g in dextrose 5% 100 mL IVPB  1 g Intravenous PRN Roxie Ricci MD        warfarin (COUMADIN) daily dosing (placeholder)   Other RX Placeholder Caleb Guerrero MD        baclofen (LIORESAL) tablet 10 mg  10 mg Oral TID PRN Brasher Dye, DO        [Held by provider] bumetanide (BUMEX) tablet 2 mg  2 mg Oral Daily Brasher Dye, DO   Stopped at 09/28/20 0900    cloNIDine (CATAPRES) tablet 0.1 mg  0.1 mg Oral TID PRN Brasher Dye, DO        escitalopram (LEXAPRO) tablet 20 mg  20 mg Oral Daily Srinivas Thacker, DO   20 mg at 09/29/20 0754    gabapentin (NEURONTIN) capsule 100 mg  100 mg Oral TID Brasher Dye, DO   100 mg at 09/29/20 1429    levothyroxine (SYNTHROID) tablet 100 mcg  100 mcg Oral Daily Srinivas Thacker, DO   100 mcg at 09/29/20 0754    lisinopril (PRINIVIL;ZESTRIL) tablet 40 mg  40 mg Oral Daily Srinivas Thacker, DO   40 mg at 09/29/20 0754    therapeutic multivitamin-minerals 1 tablet  1 tablet Oral Daily Majo Yee DO   1 tablet at 09/29/20 0754    pantoprazole (PROTONIX) tablet 40 mg  40 mg Oral BID AC [facility], DO   40 mg at 09/29/20 0754    [Held by provider] potassium chloride (KLOR-CON M) extended release tablet 10 mEq  10 mEq Oral Daily Majo Yee DO   Stopped at 09/28/20 0900    sodium chloride flush 0.9 % injection 10 mL  10 mL Intravenous 2 times per day Majo Yee DO   10 mL at 09/29/20 0755    sodium chloride flush 0.9 % injection 10 mL  10 mL Intravenous PRN Majo Yee DO        acetaminophen (TYLENOL) tablet 650 mg  650 mg Oral Q6H PRN Majo Yee DO        Or    acetaminophen (TYLENOL) suppository 650 mg  650 mg Rectal Q6H PRN Majo Yee, DO        polyethylene glycol (GLYCOLAX) packet 17 g  17 g Oral Daily PRN Majo Yee DO        promethazine (PHENERGAN) tablet 12.5 mg  12.5 mg Oral Q6H PRN Majo Yee DO        Or    ondansetron [facility]) injection 4 mg  4 mg Intravenous Q6H PRN Majo Yee DO   4 mg at 09/23/20 2129    enoxaparin (LOVENOX) injection 100 mg  1 mg/kg Subcutaneous BID [facility], DO   100 mg at 09/29/20 0755    albuterol sulfate  (90 Base) MCG/ACT inhaler 2 puff  2 puff Inhalation 4x daily [facility], DO   2 puff at 09/29/20 1354    And    ipratropium (ATROVENT HFA) 17 MCG/ACT inhaler 2 puff  2 puff Inhalation 4x daily [facility], DO   2 puff at 09/29/20 1354    glucose (GLUTOSE) 40 % oral gel 15 g  15 g Oral PRN Roger Peraza MD        dextrose 50 % IV solution  12.5 g Intravenous PRN Roger Peraza MD        glucagon (rDNA) injection 1 mg  1 mg Intramuscular PRN Roger Peraza MD        dextrose 5 % solution  100 mL/hr Intravenous PRN Roger Peraza MD        insulin lispro (HUMALOG) injection vial 0-12 Units  0-12 Units Subcutaneous TID  Roger Peraza MD   Stopped at 09/25/20 0758    insulin lispro (HUMALOG) injection vial 0-6 Units  0-6 Units Subcutaneous Nightly Roger Peraza MD        ferrous gluconate soft, non-tender; bowel sounds normal; no masses,  no organomegaly  Extremities: extremities normal, atraumatic, no cyanosis or edema  Neurologic: no focal neurologic deficits, normal sensation, alert and oriented, affect and mood appropriate  Skin: no rashes, nodules    Assessment and Plan:   Principal Problem:    COVID-19  Active Problems:    Iron deficiency anemia    Restrictive airway disease    DVT, lower extremity, proximal, acute, unspecified laterality (HCC)    Type II diabetes mellitus with nephropathy (HCC)    Generalized weakness    Accidental fall from bed    Palliative care patient  Resolved Problems:    * No resolved hospital problems. *      Day #6 levofloxacin empiric therapy for possible underlying bacterial pneumonia. I see no evidence of this, will discontinue. Not requiring supplemental oxygen. Received one dose of dexamethasone in ED, no other treatment for COVID-19 indicated. Discharge to home with home health when INR is > 2.0. Continue enoxaparin bridge until then.     Advance Directive: Full Code    DVT prophylaxis: enoxaparin bridging to warfarin    Discharge planning: to home with home health when INR is therapeutic      DO Lyla Venegas Hospitalist

## 2020-09-29 NOTE — PROGRESS NOTES
Pt resting quietly through the night. Awakened easily with verbal stimuli. Am labs obtained. No needs voiced or identified at this time. Call light in reach. Cont to monitor.   Electronically signed by Stephanie Dempsey RN on 9/29/2020 at 6:12 AM

## 2020-09-29 NOTE — PROGRESS NOTES
Spoke with pt's daughter Martita Lowry to provide spiritual care. Pt's daughter says the pt is doing well. Provided spiritual care, nurtured hope, support, and prayer. Pt's daughter expressed gratitude for spiritual care.     Electronically signed by Zita Ramos on 9/29/2020 at 3:18 PM

## 2020-09-29 NOTE — PLAN OF CARE
Problem: Airway Clearance - Ineffective  Goal: Achieve or maintain patent airway  Outcome: Ongoing     Problem: Gas Exchange - Impaired  Goal: Absence of hypoxia  Outcome: Ongoing  Goal: Promote optimal lung function  Outcome: Ongoing     Problem: Breathing Pattern - Ineffective  Goal: Ability to achieve and maintain a regular respiratory rate  Outcome: Ongoing     Problem:  Body Temperature -  Risk of, Imbalanced  Goal: Ability to maintain a body temperature within defined limits  Outcome: Ongoing  Goal: Will regain or maintain usual level of consciousness  Outcome: Ongoing  Goal: Complications related to the disease process, condition or treatment will be avoided or minimized  Outcome: Ongoing     Problem: Isolation Precautions - Risk of Spread of Infection  Goal: Prevent transmission of infection  Outcome: Ongoing     Problem: Nutrition Deficits  Goal: Optimize nutrtional status  Outcome: Ongoing     Problem: Risk for Fluid Volume Deficit  Goal: Maintain normal heart rhythm  Outcome: Ongoing  Goal: Maintain absence of muscle cramping  Outcome: Ongoing  Goal: Maintain normal serum potassium, sodium, calcium, phosphorus, and pH  Outcome: Ongoing     Problem: Loneliness or Risk for Loneliness  Goal: Demonstrate positive use of time alone when socialization is not possible  Outcome: Ongoing     Problem: Fatigue  Goal: Verbalize increase energy and improved vitality  Outcome: Ongoing     Problem: Patient Education: Go to Patient Education Activity  Goal: Patient/Family Education  Outcome: Ongoing     Problem: Falls - Risk of:  Goal: Will remain free from falls  Outcome: Ongoing  Goal: Absence of physical injury  Outcome: Ongoing     Problem: Pain:  Goal: Pain level will decrease  Outcome: Ongoing  Goal: Control of acute pain  Outcome: Ongoing  Goal: Control of chronic pain  Outcome: Ongoing

## 2020-09-29 NOTE — PROGRESS NOTES
Pt resting in bed at this time. Staff introduced. Assessment complete no needs voiced or Identified at this time. Call light in reach. Cont to monitor.   Electronically signed by Marvin Peters RN on 9/29/2020 at 6:10 AM

## 2020-09-29 NOTE — PROGRESS NOTES
Physical Therapy  Name: Meredith Tai  MRN:  [id number]  Date of service:  9/29/2020 09/29/20 1125   Restrictions/Precautions   Restrictions/Precautions Fall Risk;Isolation   Position Activity Restriction   Other position/activity restrictions droplet plus due to covid   General   Chart Reviewed Yes   Response To Previous Treatment Patient with no complaints from previous session. Family / Caregiver Present No   Referring Practitioner Angi Oconnor, DO   Subjective   Subjective Pt. willing to work with PT and try out the new Rollator. General Comment   Comments RN, Rudi Yoder PT. Pain Screening   Patient Currently in Pain No   Intervention List Patient able to continue with treatment   Pain Assessment   Pain Assessment 0-10   Pain Level 0   Functional Pain Assessment Activities are not prevented   Vital Signs   Heart Rate Source Monitor   /69   BP Location Left Arm   Oxygen Therapy   SpO2 95 %   Pulse Oximeter Device Mode Continuous   Pulse Oximeter Device Location Finger   O2 Device None (Room air)   Bed Mobility   Supine to Sit Modified independent   Sit to Supine Unable to assess   Comment BSS x 5 mins   Transfers   Sit to Stand Contact guard assistance   Stand to sit Contact guard assistance   Comment pt. stood in place x 2 mins   Ambulation   Ambulation?  Yes   Ambulation 1   Surface level tile   Device Rollator   Assistance Contact guard assistance;Minimal assistance  (needed A to guide rollator through small space)   Gait Deviations Decreased step length;Decreased step height   Distance 15'   Comments on room air, pt. has IV, telemetry, O2 sat monitor   Balance   Posture Fair   Sitting - Static +;Good   Sitting - Dynamic -;Good   Standing - Static +;Fair   Standing - Dynamic Poor;+   Exercises   Hip Flexion x10   Knee Long Arc Quad x10   Ankle Pumps x20   Upper Extremity B shoulder and elbow flexion x 10 reps in sitting   Comments pt. performed BU/LEs x 10 reps in sitting   Patient Goals    Patient goals  get stronger, breathe better   Short term goals   Time Frame for Short term goals 2 wks   Short term goal 1 supine to sit indep   Short term goal 2 sit to stand indep   Short term goal 3 amb. 48' with RW SBA   Conditions Requiring Skilled Therapeutic Intervention   Body structures, Functions, Activity limitations Decreased functional mobility ; Decreased ROM; Decreased strength;Decreased sensation;Decreased endurance;Decreased balance; Increased pain;Decreased posture   Assessment Pt worked well with therapy today. Pt. educated on use and safety with rollator. Pt. would be safe to d/c homw and amb. short distances with daughter. Rollator left in pt's room. Treatment Diagnosis impaired gait and mobility   Prognosis Good   Decision Making Medium Complexity   Barriers to Learning none noted   REQUIRES PT FOLLOW UP Yes   Treatment Initiated  gait, transfers, bed mobility   Discharge Recommendations Continue to assess pending progress; Patient would benefit from continued therapy after discharge;24 hour supervision or assist   Activity Tolerance   Activity Tolerance Patient Tolerated treatment well   PT Equipment Recommendations   Other rollator delivered by [employer]   Other Comments   Comments Pt. educated on use and safety with Rollator. Plan   Times per week 3-7   Current Treatment Recommendations Strengthening;ROM;Balance Training;Functional Mobility Training;Transfer Training; Endurance Training; Safety Education & Training;Positioning;Equipment Evaluation, Education, & procurement;Patient/Caregiver Education & Training;Gait Training   Plan Comment cont. PT per POC. Safety Devices   Type of devices Call light within reach;Gait belt;Patient at risk for falls; Left in chair;Nurse notified  (left seated upright, as pt requested)   PT Whiteboard Notes   Therapy Whiteboard RE 10/10 PNA due to covid, generalized weakness, LBP and flank pain, CHI, headache     Electronically signed by Daryle Shoe, PT on 9/29/2020 at 12:10 PM

## 2020-09-30 VITALS
RESPIRATION RATE: 13 BRPM | TEMPERATURE: 98.3 F | HEIGHT: 66 IN | OXYGEN SATURATION: 94 % | BODY MASS INDEX: 35.74 KG/M2 | SYSTOLIC BLOOD PRESSURE: 136 MMHG | WEIGHT: 222.4 LBS | DIASTOLIC BLOOD PRESSURE: 67 MMHG | HEART RATE: 63 BPM

## 2020-09-30 LAB
ANION GAP SERPL CALCULATED.3IONS-SCNC: 12 MMOL/L (ref 7–19)
APTT: 61.8 SEC (ref 26–36.2)
BASOPHILS ABSOLUTE: 0.1 K/UL (ref 0–0.2)
BASOPHILS RELATIVE PERCENT: 0.7 % (ref 0–1)
BUN BLDV-MCNC: 24 MG/DL (ref 8–23)
CALCIUM SERPL-MCNC: 8.5 MG/DL (ref 8.8–10.2)
CHLORIDE BLD-SCNC: 107 MMOL/L (ref 98–111)
CO2: 19 MMOL/L (ref 22–29)
CREAT SERPL-MCNC: 1.1 MG/DL (ref 0.5–0.9)
D DIMER: 3.53 UG/ML FEU (ref 0–0.48)
EOSINOPHILS ABSOLUTE: 0.1 K/UL (ref 0–0.6)
EOSINOPHILS RELATIVE PERCENT: 1.4 % (ref 0–5)
FIBRINOGEN: 450 MG/DL (ref 238–505)
GFR AFRICAN AMERICAN: >59
GFR NON-AFRICAN AMERICAN: 49
GLUCOSE BLD-MCNC: 88 MG/DL (ref 74–109)
GLUCOSE BLD-MCNC: 92 MG/DL (ref 70–99)
HCT VFR BLD CALC: 29.3 % (ref 37–47)
HEMOGLOBIN: 9.5 G/DL (ref 12–16)
IMMATURE GRANULOCYTES #: 0.8 K/UL
INR BLD: 1.76 (ref 0.88–1.18)
LYMPHOCYTES ABSOLUTE: 2.3 K/UL (ref 1.1–4.5)
LYMPHOCYTES RELATIVE PERCENT: 27 % (ref 20–40)
MCH RBC QN AUTO: 29.1 PG (ref 27–31)
MCHC RBC AUTO-ENTMCNC: 32.4 G/DL (ref 33–37)
MCV RBC AUTO: 89.6 FL (ref 81–99)
MONOCYTES ABSOLUTE: 1.4 K/UL (ref 0–0.9)
MONOCYTES RELATIVE PERCENT: 16.3 % (ref 0–10)
NEUTROPHILS ABSOLUTE: 3.9 K/UL (ref 1.5–7.5)
NEUTROPHILS RELATIVE PERCENT: 45.4 % (ref 50–65)
PDW BLD-RTO: 15.3 % (ref 11.5–14.5)
PERFORMED ON: NORMAL
PLATELET # BLD: 393 K/UL (ref 130–400)
PMV BLD AUTO: 10.3 FL (ref 9.4–12.3)
POTASSIUM SERPL-SCNC: 4.2 MMOL/L (ref 3.5–5)
PROTHROMBIN TIME: 20.8 SEC (ref 12–14.6)
RBC # BLD: 3.27 M/UL (ref 4.2–5.4)
SODIUM BLD-SCNC: 138 MMOL/L (ref 136–145)
WBC # BLD: 8.6 K/UL (ref 4.8–10.8)

## 2020-09-30 PROCEDURE — 85730 THROMBOPLASTIN TIME PARTIAL: CPT

## 2020-09-30 PROCEDURE — 80048 BASIC METABOLIC PNL TOTAL CA: CPT

## 2020-09-30 PROCEDURE — 85379 FIBRIN DEGRADATION QUANT: CPT

## 2020-09-30 PROCEDURE — 6370000000 HC RX 637 (ALT 250 FOR IP): Performed by: HOSPITALIST

## 2020-09-30 PROCEDURE — 2580000003 HC RX 258: Performed by: FAMILY MEDICINE

## 2020-09-30 PROCEDURE — 85025 COMPLETE CBC W/AUTO DIFF WBC: CPT

## 2020-09-30 PROCEDURE — 85384 FIBRINOGEN ACTIVITY: CPT

## 2020-09-30 PROCEDURE — 6370000000 HC RX 637 (ALT 250 FOR IP): Performed by: FAMILY MEDICINE

## 2020-09-30 PROCEDURE — 85610 PROTHROMBIN TIME: CPT

## 2020-09-30 PROCEDURE — 6360000002 HC RX W HCPCS: Performed by: FAMILY MEDICINE

## 2020-09-30 PROCEDURE — 82947 ASSAY GLUCOSE BLOOD QUANT: CPT

## 2020-09-30 RX ORDER — WARFARIN SODIUM 7.5 MG/1
15 TABLET ORAL DAILY
Qty: 60 TABLET | Refills: 0 | Status: SHIPPED | OUTPATIENT
Start: 2020-09-30 | End: 2021-06-01 | Stop reason: SDUPTHER

## 2020-09-30 RX ADMIN — FERROUS GLUCONATE TAB 324 MG (37.5 MG ELEMENTAL IRON) 324 MG: 324 (37.5 FE) TAB at 07:55

## 2020-09-30 RX ADMIN — LISINOPRIL 40 MG: 20 TABLET ORAL at 07:55

## 2020-09-30 RX ADMIN — IPRATROPIUM BROMIDE 2 PUFF: 17 AEROSOL, METERED RESPIRATORY (INHALATION) at 07:55

## 2020-09-30 RX ADMIN — ALBUTEROL SULFATE 2 PUFF: 90 AEROSOL, METERED RESPIRATORY (INHALATION) at 07:55

## 2020-09-30 RX ADMIN — ENOXAPARIN SODIUM 100 MG: 100 INJECTION SUBCUTANEOUS at 07:55

## 2020-09-30 RX ADMIN — PANTOPRAZOLE SODIUM 40 MG: 40 TABLET, DELAYED RELEASE ORAL at 05:14

## 2020-09-30 RX ADMIN — GABAPENTIN 100 MG: 100 CAPSULE ORAL at 07:55

## 2020-09-30 RX ADMIN — ESCITALOPRAM OXALATE 20 MG: 10 TABLET ORAL at 07:55

## 2020-09-30 RX ADMIN — LEVOTHYROXINE SODIUM 100 MCG: 100 TABLET ORAL at 05:14

## 2020-09-30 RX ADMIN — Medication 10 ML: at 07:56

## 2020-09-30 RX ADMIN — MULTIPLE VITAMINS W/ MINERALS TAB 1 TABLET: TAB at 07:55

## 2020-09-30 NOTE — PROGRESS NOTES
CLINICAL PHARMACY NOTE: MEDS TO [address] [facility] Patient?: Yes  Total # of Prescriptions Filled: 2   The following medications were delivered to the patient:  · Jantoven 7.5 mg  · Enoxaparin 100mg/ml  Total # of Interventions Completed: 0  Time Spent (min): 30    Additional Documentation:    Handed scripts to [facility] who was going to take them to CCU. Patients Daughter called and paid by phone and manually rang up through the register.

## 2020-09-30 NOTE — DISCHARGE SUMMARY
Amy Guillen  :  1950  MRN:  [id number]    Admit date:  2020  Discharge date:      Admitting Physician:  Alex Mccormick DO    Advance Directive: Full Code    Consults: palliative care    Primary Care Physician:  Katarzyna Allen MD    Discharge Diagnoses:  Principal Problem:    COVID-19  Active Problems:    Iron deficiency anemia    Restrictive airway disease    DVT, lower extremity, proximal, acute, unspecified laterality ([address]    Type II diabetes mellitus with nephropathy ([address]    Generalized weakness    Accidental fall from bed    Palliative care patient  Resolved Problems:    * No resolved hospital problems. *      Significant Diagnostic Studies:   Ct Head Wo Contrast    Result Date: 2020  CT HEAD WO CONTRAST 2020 2:12 PM HISTORY: Syncope, fall COMPARISON: CT scan dated 2018 DOSE LENGTH PRODUCT: 760 mGy cm TECHNIQUE: Helical tomographic images of the brain were obtained without the use of intravenous contrast. Automated exposure control was also utilized to decrease patient radiation dose. FINDINGS: There is no evidence of evolving large vascular territory infarct. No visualized intra-axial or extra-axial hemorrhage. No mass lesion is identified. Normal size and configuration of the ventricular system. The basal cisterns are symmetric. Posterior fossa structures are unremarkable. Right globe phthisis bulbi. The visualized paranasal sinuses, mastoid air cells and middle ear cavities are clear. The visualized osseous structures and overlying soft tissues of the skull and face are unremarkable. 1. No acute intracranial process.  Signed by Dr Salud Yen on 2020 4:11 PM    Ct Cervical Spine Wo Contrast    Result Date: 2020  CT CERVICAL SPINE WO CONTRAST 2020 2:12 PM HISTORY: Incomplete, fall COMPARISON: CT scan dated 2018 DOSE LENGTH PRODUCT: 369 mGy cm TECHNIQUE: Serial helical tomographic images of the cervical spine were obtained without the use of intravenous contrast. multifocal lung infiltrates, most notably in the lung bases. Upper lobe infiltrates are rounded and groundglass, which raises suspicion for atypical pneumonia such as Covid 19. However, the bilateral lung bases appear more consolidated with earlier bronchograms. I would include pulmonary edema in the differential as well given the extent of the consolidation. The results of this exam were discussed with Dr. Florencia Gross on 9/23/2020 at 1617 hours Signed by Dr Nika Granado on 9/23/2020 4:17 PM    Ct Lumbar Spine Wo Contrast    Result Date: 9/23/2020  CT LUMBAR SPINE [address] 9/23/2020 2:11 PM HISTORY: Fall from bed COMPARISON: None DLP: 1111 mGy cm TECHNIQUE: Serial helical tomographic images of the lumbar spine were obtained without the use of intravenous contrast. Additionally, sagittal and coronal reformatted images were provided for review. Automated exposure control was also utilized to decrease patient radiation dose. FINDINGS: There is no evidence of fracture or subluxation. Normal lumbar lordosis is maintained. Vertebral body heights are well maintained. The disc spaces are fairly well preserved. There is no high-grade bony narrowing of the spinal canal or neuroforamina. The posterior elements are intact. The visualized soft tissues are unremarkable. IVC filter identified. 1. No evidence of acute injury in the osseous lumbar spine. Signed by Dr Nika Granado on 9/23/2020 4:19 PM    Ct Abdomen Pelvis W Iv Contrast Additional Contrast? None    Result Date: 9/23/2020  CT ABDOMEN PELVIS W IV CONTRAST 9/23/2020 2:11 PM HISTORY: Fall from bed, bilateral flank pain, anticoagulated COMPARISON: CT scan dated 1/31/2020. DLP: 1514 mGy cm TECHNIQUE: Following the uneventful administration of intravenous iodinated contrast, helical CT tomographic images of the abdomen and pelvis were acquired. Coronal reformatted images were also provided for review.  Automated exposure control was also utilized to decrease patient radiation dose. FINDINGS: Bibasilar consolidative infiltrates conforming to more dependent distribution. No pleural effusions. Four-chamber cardiomegaly. Partially imaged pacer wires in the right heart. LIVER: No focal liver lesion. The hepatic vasculature is patent. BILIARY SYSTEM: The gallbladder is surgically absent. No intrahepatic or extrahepatic ductal dilatation. PANCREAS: Diffusely atrophic. No focal lesion. SPLEEN: Spleen is surgically absent. KIDNEYS AND ADRENALS: Bilateral kidneys and adrenal glands are unremarkable. The ureters are decompressed and normal in appearance. RETROPERITONEUM: No mass, lymphadenopathy or hemorrhage. GI TRACT: Evidence of prior gastric bypass. The stomach is nondistended. Mild fluid in the small bowel, however, small bowel is nondilated. Moderate colonic stool along the transverse colon. No inflammatory changes identified along the colon. OTHER: Prior ventral hernia repair. No recurrent hernia. No mesenteric adenopathy or mass. No intraperitoneal free fluid or free air. There are surgical clips identified in the right lower quadrant. Abdominal vascular structures are patent with only mild atheromatous calcification along the abdominal aorta. 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 No acute bony abnormality. PELVIS: No mass lesion, fluid collection or significant lymphadenopathy is seen in the pelvis. The urinary bladder is normal in appearance. 1. No acute process in the abdomen or pelvis. 2. Bibasilar consolidative lung infiltrates which appear to be conforming to more deep tendon distribution. Pulmonary edema certainly a consideration. Atypical pneumonia is also a consideration. 3. No evidence of bowel obstruction or viscus perforation. No peritoneal abscess. 4. No urolithiasis and/or hydronephrosis. 5. Prior gastric bypass. 6. Prior ventral hernia repair. No recurrent hernia. 7. IVC filter.  Signed by Dr Kerline Clark on 9/23/2020 4:26 PM    Xr Chest Portable    Result Date: 9/23/2020  XR CHEST PORTABLE 9/23/2020 1:49 PM HISTORY: Fall, fatigue COMPARISON: Chest exam dated 9/17/2020. FINDINGS: Shallow inspiration with mild bibasilar atelectasis. No consolidation. No pleural effusion or pneumothorax. Underlying cardiomegaly which is stable. Central vascular prominence is also stable. No septal thickening or subpleural Kerley B lines. Left chest wall dual chamber pacemaker. Surgical clips in the left upper quadrant. No acute bony abnormality. 1. Shallow inspiration with mild bibasilar atelectasis. 2. Underlying cardiomegaly. No interstitial or jose carlos pulmonary edema. Signed by Dr Anand Scott on 9/23/2020 2:59 PM      Pertinent Labs:   CBC:   Recent Labs     09/28/20 1120 09/29/20  0545 09/30/20  0515   WBC 10.9* 10.1 8.6   HGB 10.1* 9.7* 9.5*    395 393     BMP:    Recent Labs     09/28/20 1120 09/29/20  0545 09/30/20  0515    137 138   K 4.0 4.1 4.2    107 107   CO2 20* 20* 19*   BUN 27* 26* 24*   CREATININE 1.5* 1.3* 1.1*   GLUCOSE 110* 93 88     INR:   Recent Labs     09/28/20 1120 09/29/20  0545 09/30/20  0515   INR 1.62* 1.62* 1.76*     ABGs:No results for input(s): PH, PO2, PCO2, HCO3, BE, O2SAT in the last 72 hours. Lactic Acid:No results for input(s): LACTA in the last 72 hours. Procedures: None performed. Hospital Course:   [copied from previous provider]  9/23/2020  69year old black female presents to the emergency department with complaint of generalized weakness and fatigue since fall from bed 3 weeks ago. She was seen at her primary care provider's office yesterday and underwent imaging, was directed to come to the hospital after review. She denies any significant shortness of breath, fever, chills, or cough above her baseline (Restrictive lung disease). She does have a coworker at [place] [address] who tested positive for COVID-19.  Patient is unsure why she fell initially and states she was well prior, but was too weak to get off the floor. She is maintaining saturation on room air. No treatment prior to arrival aside from home medications. Patient is fatigued and forgetful, is a poor historian. 9/24/2020  Patient feels tired and fatigued.  Not using any oxygen.  She is holding her O2 saturations and dexamethasone was discontinued.  We could consider patient discharge if she remains stable over 24 hours. 9/25/2020  Patient feels tired and fatigued.  Getting out of bed and walking in the room without any desaturations.  He was initially planned to be discharged which was held because patient's INR is subtherapeutic.  9/26/2020  Patient is feeling weak and tired. Jose Perrin INR is still subtherapeutic at 1. 19.  Her potassium was replaced and is normal at 4.7.  Creatinine initially showed improvement but is now up at 1.6.  9/27/2020  Patient is feeling tired, but able to walk in the room without requiring oxygen.  Are still subtherapeutic at 1.29.  Creatinine improved to 1.4 overnight with gentle IV hydration. [end copied portion]  9-28: Patient is feeling well and is eager for discharge. She adamantly refuses SNF placement, will discharge to home with home health only. INR still low at 1.62 so discharge will be held until at least tomorrow. 9-29: INR still 1.62, hold and discharge when > 2.00.  9-30: INR still subtherapeutic. Patient is quite eager for discharge to home. She states she will be able to handle home enoxaparin injections. Home health orders placed and patient will have INR rechecked Friday, 10-2. Can stop enoxaparin and continue warfarin 15 mg daily at that time if INR is 2 to 3. No treatment required for COVID-19 status, though educated on quarantine. She has worked with physical therapy and mobility is improved.     Physical Exam:  Vitals: /67   Pulse 63   Temp 98.3 °F (36.8 °C) (Temporal)   Resp 13   Ht 5' 6\" (1.676 m)   Wt 222 lb 6.4 oz (100.9 kg)   SpO2 94%   BMI 35.90 kg/m²   24HR INTAKE/OUTPUT: ferrous gluconate (FERGON) 240 (27 Fe) MG tablet  Take 1 tablet by mouth 3 times daily (with meals)             fexofenadine (ALLEGRA) 180 MG tablet  Take 1 tablet by mouth daily Indications: Allergic Rhinitis             gabapentin (NEURONTIN) 100 MG capsule  Take 1 capsule by mouth 3 times daily for 90 days. levothyroxine (SYNTHROID) 100 MCG tablet  Take 1 tablet by mouth Daily             lisinopril (PRINIVIL;ZESTRIL) 40 MG tablet  Take 1 tablet by mouth daily             Multiple Vitamins-Minerals (CENTRUM ADULTS) TABS  Take 1 tablet by mouth daily             ondansetron (ZOFRAN) 4 MG tablet  Take 1 tablet by mouth daily as needed for Nausea or Vomiting             pantoprazole (PROTONIX) 40 MG tablet  Take 1 tablet by mouth 2 times daily (before meals)             potassium chloride (KLOR-CON M) 10 MEQ extended release tablet  Take 1 tablet by mouth daily             tiotropium (SPIRIVA HANDIHALER) 18 MCG inhalation capsule  Inhale 1 capsule into the lungs daily             vitamin D (ERGOCALCIFEROL) 1.25 MG (96545 UT) CAPS capsule  Take 1 capsule by mouth Twice a Week             warfarin (COUMADIN) 7.5 MG tablet  Take 2 tablets by mouth daily 2 tablets daily, DO NOT RESUME UNTIL YOU RECHECK YOUR INR ON THURSDAY AND DISCUSS WITH PCP                 Discharge Instructions: Follow up with Gianfranco Desai MD in 7 days. Take medications as directed. Resume activity as tolerated. Diet: DIET CARB CONTROL; Safety Tray; Safety Tray (Disposables)     Disposition: Patient is medically stable and will be discharged Home. Time spent on discharge greater than 30 minutes.     Signed:  Henrietta Curling, DO

## 2020-09-30 NOTE — CARE COORDINATION
Attending Dr Valeria Guzman reports pt will dc home with Lovenox injections. SW requested Rx to [facility] pharmacy for pricing and awaiting at this time. SW contacted [facility] RN Nadege Pastor to request additional SN services with the [facility] at [place] for INR checks. Electronically signed by Denny Clement on 9/30/2020 at 10:02 AM  Informed by Noris John the costs for Lovenox is $44.98 and Coumadin $7.32. SW contacted pt via bedside phone and the pt is going to check affordability with her dtr and let SW know. Pt reports having coumadin at home and would not need that Rx filled at dc. Pt's RN Isabelle Magallon states those are the only dc meds at this time.

## 2020-09-30 NOTE — PROGRESS NOTES
Infection Prevention Note:    [facility] Department notified of pateint's pending discharge today.

## 2020-10-01 ENCOUNTER — CARE COORDINATION (OUTPATIENT)
Dept: CASE MANAGEMENT | Age: 70
End: 2020-10-01

## 2020-10-01 NOTE — CARE COORDINATION
[facility] Transitions Initial Follow Up Call    Call within 2 business days of discharge: Yes    Patient: Arlene Grove Patient : 1950   MRN: [id number]  Reason for Admission: COVID 19   Discharge Date: 20 RARS: Readmission Risk Score: 29      Last Discharge [facility]       Complaint Diagnosis Description Type Department Provider    20 Fatigue; Flank Pain Pneumonia due to COVID-19 virus . .. ED to Hosp-Admission (Discharged) (ADMIT) [facility] CCU Anshu Burns DO; Shauna Roberts MD; As. .. Spoke with: N/A    Facility: [address] [place] [address]    Non-face-to-face services provided:  Reviewed encounter information for continuity of care prior to follow up Care Transitions phone call - chart notes, consults, progress notes, test results, med list, appointments, AVS, other information. Care Transitions 24 Hour Call    Do you have all of your prescriptions and are they filled?:  Yes  Patient DME:  Straight cane  Patient Home Equipment:  CPAP  Do you have support at home?:  Child  Are you an active caregiver in your home?:  No  Care Transitions Interventions       Attempted to make contact with patient for an initial follow up call post discharge without success. Unable to leave a message regarding intent of call and call back information. CTN to try again at a later time.      Follow Up  Future Appointments   Date Time Provider Jose Shea   10/28/2020 10:00 AM SCHEDULE, [facility] MED ONC MA [facility] MED ONC Shannan BOOTH   10/28/2020 10:15 AM CHANELLE Long [place]   2020 10:00 AM MD HIPOLITO Rodriguez [facility]-PIEDAD Magdaleno RN

## 2020-10-02 ENCOUNTER — TELEPHONE (OUTPATIENT)
Dept: INTERNAL MEDICINE CLINIC | Age: 70
End: 2020-10-02

## 2020-10-02 ENCOUNTER — CARE COORDINATION (OUTPATIENT)
Dept: CASE MANAGEMENT | Age: 70
End: 2020-10-02

## 2020-10-02 NOTE — CARE COORDINATION
[facility] Transitions Initial Follow Up Call    Call within 2 business days of discharge: Yes    Patient: Sylwia Parker Patient : 1950   MRN: [id number]  Reason for Admission: PNA/ COVID 19  Discharge Date: 20 RARS: Readmission Risk Score: 29      Last Discharge [facility]       Complaint Diagnosis Description Type Department Provider    20 Fatigue; Flank Pain Pneumonia due to COVID-19 virus . .. ED to Hosp-Admission (Discharged) (ADMIT) [facility] Julio Gillespie DO; Demetria Hdz MD; As. .. Spoke with: N/A    Facility: [place] [address]    Non-face-to-face services provided:  Reviewed encounter information for continuity of care prior to follow up phone call - chart notes, consults, progress notes, test results, med list, appointments, AVS, other information. Care Transitions 24 Hour Call    Do you have all of your prescriptions and are they filled?:  Yes  Patient DME:  Straight cane  Patient Home Equipment:  CPAP  Do you have support at home?:  Child  Are you an active caregiver in your home?:  No  Care Transitions Interventions         Follow Up : Attempted to make contact with [facility] for [facility] initial follow up call post discharge from the hospital without success. Unable to leave a message regarding intent of call and call back information. Will try again at a later time.     Future Appointments   Date Time Provider Jose Shea   10/28/2020 10:00 AM SCHEDULE, [facility] MED ONC MA [facility] MED ONC Shannan BOOTH   10/28/2020 10:15 AM CHANELLE Hsu [place]   2020 10:00 AM MD HIPOLITO Tellez [facility] Frank RN

## 2020-10-02 NOTE — TELEPHONE ENCOUNTER
[facility] nurse admitted pt 10/1    Nurse will see her weekly for 5 weeks for CP assessments, monitor o2 sats, monitor for med compliance and education on meds, diagnosis as needed, instruct on lovenox injection. Pt missed her 10/1 am dose of lovenox due to pt thinking it was only once daily   INR will be checked today     1 ) t has B12 in the home would you want  to give that while on service ? 2 ) Would you recommend flu vaccine at this time  ? Or wait  ? 3 ) her daughter said health dept told them that she can go off quarantine status 10/4/2020  she tested positive on 9/23/20 and has 4 of her 5 household family members who are also positive and asymptomatic. Family  asked if will there be any re-testing done before lifting quarantine?

## 2020-10-02 NOTE — TELEPHONE ENCOUNTER
Wait on flu. She is highly noncompliant. Okay to give B12 injections.  Test for COVID at end of quarantine

## 2020-10-05 ENCOUNTER — TELEPHONE (OUTPATIENT)
Dept: INTERNAL MEDICINE CLINIC | Age: 70
End: 2020-10-05

## 2020-10-05 NOTE — TELEPHONE ENCOUNTER
[address] [address] PT evaluation completed; Patient presents with decreased strength and endurance;  is able to perform all household transfers and bed mobility without assistance and ambulates short distances in home without assistive device. Patient's family is ensuring that patient performs daily exercises and walks in house frequently throughout the day to get her strength back. Patient does not feel she needs skilled physical therapy at this time. RECOMMENDATION:   Evaluation only;  no further physical therapy visits planned    fyi

## 2020-10-07 ENCOUNTER — TELEPHONE (OUTPATIENT)
Dept: INTERNAL MEDICINE CLINIC | Age: 70
End: 2020-10-07

## 2020-10-07 NOTE — TELEPHONE ENCOUNTER
[facility] OT reporting that OT eval completed and Pt doing well with adl routines with supervision from family  No ongoing skilled OT needs identified at this time

## 2020-10-09 ENCOUNTER — CARE COORDINATION (OUTPATIENT)
Dept: CASE MANAGEMENT | Age: 70
End: 2020-10-09

## 2020-10-09 NOTE — CARE COORDINATION
[facility] Transitions Follow Up Call    10/9/2020     Patient: Crista Michael  Patient : 1950   MRN: [id number]  Reason for Admission:   Discharge Date: 20 RARS: Readmission Risk Score: 29      Needs to be reviewed by the provider   Additional needs identified to be addressed with provider No  none  Discussed COVID-19 related testing which was available at this time. Test results were positive. Patient informed of results, if available? Yes         Method of communication with provider : none    Care Transition Nurse (CTN) contacted the patient by telephone to follow up after admission on 20-20. Verified name and  with patient as identifiers. Addressed changes since last contact: symptom management-No new or worsening symptom. She stated she has noticed she has occasional night sweats. Discharged needs reviewed: none  Follow up appointment completed? No    CTN reviewed discharge instructions, medical action plan and red flags with patient and discussed any barriers to care and/or understanding of plan of care after discharge. Discussed appropriate site of care based on symptoms and resources available to patient including: PCP. The patient agrees to contact the PCP office for questions related to their healthcare. Patients top risk factors for readmission: medical condition  Interventions to address risk factors: Reviewed signs/symptoms and when to contact MD    Discussed follow-up appointments. If no appointment was previously scheduled, appointment scheduling offered: Yes     Plan for follow-up call in 7-10 days based on severity of symptoms and risk factors. Plan for next call: symptom management-Follow up on any new or worsening symptoms  CTN provided contact information for future needs.       Care Transitions Subsequent and Final Call    Subsequent and Final Calls  Have your medications changed?:  No  Do you have any questions related to your medications?:  No  Do you currently have any active services?:  No  Do you have any needs or concerns that I can assist you with?:  No  Identified Barriers:  None  Care Transitions Interventions  Other Interventions: Follow Up:  Contacted patient for BPCI-A follow up. Spoke briefly with patient. Introduced self and explained the BPCI-A bundle and 90 day follow up. Patient in agreement with follow up calls. Valorie Juan stated she is doing okay. She reports she may become short of breath at times usually when she is walking around. She denies having any chest pain/discomfort, pressure, tightness, cough, fever, chills. She reports that she may have night sweats at times. Appetite has been okay and she is trying to increase her fluid intake. Discussed signs/symptoms and when to contact MD with any new or worsening symptoms. She verbalized understanding. Unable to review medication list.  Patient confirmed she has all of her medications. No questions or concerns at this time. Will continue to follow.       Future Appointments   Date Time Provider Jose Shea   10/28/2020 10:00 AM SCHEDULE, [facility] MED ONC MA [facility] MED ONC Shannan BOOTH   10/28/2020 10:15 [facility]   11/20/2020 10:00 AM MD HIPOLITO Bronson P-[place]ED[place] Robles, RN

## 2020-10-13 ENCOUNTER — OFFICE VISIT (OUTPATIENT)
Age: 70
End: 2020-10-13

## 2020-10-13 VITALS — OXYGEN SATURATION: 97 % | TEMPERATURE: 98 F | HEART RATE: 80 BPM

## 2020-10-15 ENCOUNTER — TELEPHONE (OUTPATIENT)
Dept: INTERNAL MEDICINE | Age: 70
End: 2020-10-15

## 2020-10-15 LAB — SARS-COV-2, NAA: NOT DETECTED

## 2020-10-15 NOTE — TELEPHONE ENCOUNTER
We received a form from [place] [address] for CPAP supplies. We need some info from the form. We need to know if is a CPAP and not a BIPAP. Also, we need to know the pressure settings. I have LM for CB.

## 2020-10-17 NOTE — PROGRESS NOTES
Physician Progress Note      Miles Barksdale  CSN #:                  [id number]  :                       1950  ADMIT DATE:       2020 2:18 PM  [facility] DATE:        2020 1:49 PM  RESPONDING  PROVIDER #:        Servando GOMEZ DO          QUERY TEXT:    Pt admitted with COVID,  Pt noted to have increase in creatinine from 0.5 to   1.6. . If possible, please document in the progress notes and discharge   summary if you are evaluating and/or treating any of the following: The medical record reflects the following:  Risk Factors: DM, Weakness, COVID, dehydration  Clinical Indicators: creatinine 1.6  Treatment:  IVF, labx  Options provided:  -- Acute kidney failure  -- Acute kidney failure with acute tubular necrosis  -- Acute kidney injury  -- Other - I will add my own diagnosis  -- Disagree - Not applicable / Not valid  -- Disagree - Clinically unable to determine / Unknown  -- Refer to Clinical Documentation Reviewer    PROVIDER RESPONSE TEXT:    This patient has an Acute kidney injury.     Query created by: Bree Enciso on 10/16/2020 3:38 PM      Electronically signed by:  Phu Deluca DO 10/17/2020 8:49 AM

## 2020-10-19 ENCOUNTER — CARE COORDINATION (OUTPATIENT)
Dept: CASE MANAGEMENT | Age: 70
End: 2020-10-19

## 2020-10-20 ENCOUNTER — TELEPHONE (OUTPATIENT)
Dept: INTERNAL MEDICINE CLINIC | Age: 70
End: 2020-10-20

## 2020-10-20 NOTE — TELEPHONE ENCOUNTER
She did have a home INR machine. She can check it home. If she no longer has it, I will call her to schedule a Coumadin Clinic appt.

## 2020-10-20 NOTE — TELEPHONE ENCOUNTER
When [facility] called the pt to let her know to hold coumadin pt informed the nurse that she has went back to work and will be working on Friday   [facility] will have to discharge pt as she is not homebound if she is working again   Pt will need to follow up with your office on getting that INR rechecked this week as she is holding for elevated INR today

## 2020-10-20 NOTE — TELEPHONE ENCOUNTER
[facility] reporting   todays INR is 6.7      Pt has been taking coumadin  7.5 mg TAKE TWO TABS daily  (15mg) she has been on this dose since hospital stay     10/2 pt PT/INR was 26.8 / 2. 2     Please advise

## 2020-10-20 NOTE — TELEPHONE ENCOUNTER
[address] [address] just needed the okay to take pt off covid precautions - is that okay  ?    Looks like she has had a negative test now

## 2020-10-21 NOTE — TELEPHONE ENCOUNTER
Pt told the [facility] nurse she was out of strips for her INR machine but she said she will get them ordered - pt v/u of getting her INR rechecked this week either with her machine or at your office       Pt was dc from [facility] 10/20  Due to her going back to work / not homebound

## 2020-10-22 ENCOUNTER — NURSE ONLY (OUTPATIENT)
Dept: INTERNAL MEDICINE | Age: 70
End: 2020-10-22
Payer: MEDICARE

## 2020-10-22 LAB — INTERNATIONAL NORMALIZATION RATIO, POC: 5.4

## 2020-10-22 PROCEDURE — 90694 VACC AIIV4 NO PRSRV 0.5ML IM: CPT | Performed by: INTERNAL MEDICINE

## 2020-10-22 PROCEDURE — 93793 ANTICOAG MGMT PT WARFARIN: CPT | Performed by: INTERNAL MEDICINE

## 2020-10-22 PROCEDURE — G0008 ADMIN INFLUENZA VIRUS VAC: HCPCS | Performed by: INTERNAL MEDICINE

## 2020-10-22 PROCEDURE — 96372 THER/PROPH/DIAG INJ SC/IM: CPT | Performed by: INTERNAL MEDICINE

## 2020-10-22 PROCEDURE — 85610 PROTHROMBIN TIME: CPT | Performed by: INTERNAL MEDICINE

## 2020-10-22 RX ORDER — CYANOCOBALAMIN 1000 UG/ML
1000 INJECTION INTRAMUSCULAR; SUBCUTANEOUS ONCE
Status: COMPLETED | OUTPATIENT
Start: 2020-10-22 | End: 2020-10-22

## 2020-10-22 RX ADMIN — CYANOCOBALAMIN 1000 MCG: 1000 INJECTION INTRAMUSCULAR; SUBCUTANEOUS at 11:36

## 2020-10-22 NOTE — PROGRESS NOTES
Ms. Andi Woodard was here today. INR today:   Results for orders placed or performed in visit on 10/22/20   POCT INR   Result Value Ref Range    INR 5.4      INR Goal: 2.0-3.0    Dosing Plan  As of 10/22/2020    TTR:   17.0 % (2.4 y)   Full warfarin instructions:   10/22: Hold; 10/23: Larayne Hall; 10/24: Hold; Otherwise 7.5 mg every day            PLAN:HOLD TODAY, TOMORROW, AND SAT, THEN RESUME CURRENT DOSE  NEXT COUMADIN CLINIC APT IS: 10/27/2020 AT 11:15AM        IF IT'S AN EMERGENCY, PLEASE CALL 911 OR GO TO YOUR NEAREST EMERGENCY ROOM. [facility] COUMADIN CLINIC [phone]  IF UNABLE TO REACH COUMADIN CLINIC, [address] [place] [address] Rd, [phone].

## 2020-10-26 ENCOUNTER — ANTI-COAG VISIT (OUTPATIENT)
Dept: INTERNAL MEDICINE | Age: 70
End: 2020-10-26
Payer: MEDICARE

## 2020-10-26 LAB — INR BLD: 1.3

## 2020-10-26 PROCEDURE — 93793 ANTICOAG MGMT PT WARFARIN: CPT | Performed by: INTERNAL MEDICINE

## 2020-10-26 NOTE — PROGRESS NOTES
HOME MONITORING REPORT    INR today:   Results for orders placed or performed in visit on 10/26/20   Protime-INR   Result Value Ref Range    INR 1.30        INR Goal: 2.0-3.0    Dosing Plan  As of 10/26/2020    TTR:   17.0 % (2.5 y)   Full warfarin instructions:   7.5 mg every day              PLAN: Advised patient/caregiver to continue current dose and recheck in one week. Patient/Caregiver voiced understanding      Electronically signed by Helio Concepcion MD on 10/26/2020 at 11:56 AM    I have reviewed nursing plan for Coumadin management and agree with plan.

## 2020-10-28 ENCOUNTER — HOSPITAL ENCOUNTER (OUTPATIENT)
Dept: INFUSION THERAPY | Age: 70
Discharge: HOME | End: 2020-10-28
Payer: MEDICARE

## 2020-10-28 ENCOUNTER — OFFICE VISIT (OUTPATIENT)
Dept: HEMATOLOGY | Age: 70
End: 2020-10-28
Payer: MEDICARE

## 2020-10-28 VITALS
BODY MASS INDEX: 37.14 KG/M2 | SYSTOLIC BLOOD PRESSURE: 160 MMHG | DIASTOLIC BLOOD PRESSURE: 92 MMHG | HEART RATE: 82 BPM | OXYGEN SATURATION: 95 % | TEMPERATURE: 96.8 F | WEIGHT: 231.1 LBS | HEIGHT: 66 IN

## 2020-10-28 DIAGNOSIS — D50.9 IRON DEFICIENCY ANEMIA, UNSPECIFIED IRON DEFICIENCY ANEMIA TYPE: ICD-10-CM

## 2020-10-28 LAB
BASOPHILS ABSOLUTE: 0.02 K/UL (ref 0.01–0.08)
BASOPHILS RELATIVE PERCENT: 0.4 % (ref 0.1–1.2)
EOSINOPHILS ABSOLUTE: 0.23 K/UL (ref 0.04–0.54)
EOSINOPHILS RELATIVE PERCENT: 4.4 % (ref 0.7–7)
HCT VFR BLD CALC: 33.2 % (ref 34.1–44.9)
HEMOGLOBIN: 10.6 G/DL (ref 11.2–15.7)
LYMPHOCYTES ABSOLUTE: 1.59 K/UL (ref 1.18–3.74)
LYMPHOCYTES RELATIVE PERCENT: 30.2 % (ref 19.3–53.1)
MCH RBC QN AUTO: 30.5 PG (ref 25.6–32.2)
MCHC RBC AUTO-ENTMCNC: 31.9 G/DL (ref 32.3–35.5)
MCV RBC AUTO: 95.4 FL (ref 79.4–94.8)
MONOCYTES ABSOLUTE: 0.75 K/UL (ref 0.24–0.82)
MONOCYTES RELATIVE PERCENT: 14.3 % (ref 4.7–12.5)
NEUTROPHILS ABSOLUTE: 2.67 K/UL (ref 1.56–6.13)
NEUTROPHILS RELATIVE PERCENT: 50.7 % (ref 34–71.1)
PDW BLD-RTO: 17.9 % (ref 11.7–14.4)
PLATELET # BLD: 151 K/UL (ref 182–369)
PMV BLD AUTO: 10.7 FL (ref 7.4–10.4)
RBC # BLD: 3.48 M/UL (ref 3.93–5.22)
WBC # BLD: 5.26 K/UL (ref 3.98–10.04)

## 2020-10-28 PROCEDURE — G8417 CALC BMI ABV UP PARAM F/U: HCPCS | Performed by: NURSE PRACTITIONER

## 2020-10-28 PROCEDURE — 3017F COLORECTAL CA SCREEN DOC REV: CPT | Performed by: NURSE PRACTITIONER

## 2020-10-28 PROCEDURE — 1090F PRES/ABSN URINE INCON ASSESS: CPT | Performed by: NURSE PRACTITIONER

## 2020-10-28 PROCEDURE — 4040F PNEUMOC VAC/ADMIN/RCVD: CPT | Performed by: NURSE PRACTITIONER

## 2020-10-28 PROCEDURE — 99213 OFFICE O/P EST LOW 20 MIN: CPT | Performed by: NURSE PRACTITIONER

## 2020-10-28 PROCEDURE — 1111F DSCHRG MED/CURRENT MED MERGE: CPT | Performed by: NURSE PRACTITIONER

## 2020-10-28 PROCEDURE — 99211 OFF/OP EST MAY X REQ PHY/QHP: CPT

## 2020-10-28 PROCEDURE — 1123F ACP DISCUSS/DSCN MKR DOCD: CPT | Performed by: NURSE PRACTITIONER

## 2020-10-28 PROCEDURE — 1036F TOBACCO NON-USER: CPT | Performed by: NURSE PRACTITIONER

## 2020-10-28 PROCEDURE — G8484 FLU IMMUNIZE NO ADMIN: HCPCS | Performed by: NURSE PRACTITIONER

## 2020-10-28 PROCEDURE — G8399 PT W/DXA RESULTS DOCUMENT: HCPCS | Performed by: NURSE PRACTITIONER

## 2020-10-28 PROCEDURE — 85025 COMPLETE CBC W/AUTO DIFF WBC: CPT

## 2020-10-28 PROCEDURE — G8427 DOCREV CUR MEDS BY ELIG CLIN: HCPCS | Performed by: NURSE PRACTITIONER

## 2020-10-28 RX ORDER — POTASSIUM CHLORIDE 750 MG/1
10 TABLET, FILM COATED, EXTENDED RELEASE ORAL DAILY
COMMUNITY
Start: 2020-10-27 | End: 2022-02-14 | Stop reason: SDUPTHER

## 2020-10-28 ASSESSMENT — ENCOUNTER SYMPTOMS
ABDOMINAL PAIN: 0
CONSTIPATION: 0
WHEEZING: 0
EYE DISCHARGE: 0
EYE PAIN: 0
SORE THROAT: 0
NAUSEA: 0
DIARRHEA: 0
EYE REDNESS: 0
BLOOD IN STOOL: 0
EYES NEGATIVE: 1
BACK PAIN: 0
GASTROINTESTINAL NEGATIVE: 1
COUGH: 0
VOMITING: 0

## 2020-10-28 NOTE — PROGRESS NOTES
Progress Note      Pt Name: Марина Finley: 1950  MRN: [id number]    Date of evaluation: 10/28/2020  History Obtained From:  patient, electronic medical record    CHIEF COMPLAINT:    Chief Complaint   Patient presents with    Follow-up     Iron deficiency anemia, unspecified iron deficiency anemia type      HISTORY OF PRESENT ILLNESS:    Braulio Mendez is a 69 y.o.  female with significant PMH recurrent DVT, thrombocytopenia and iron deficiency anemia. Agnes Escalera continues on long-term anticoagulation with Coumadin and receives B12 injections under the management of Dr. Roger Finn. She is currently taking ferrous gluconate 240 mg p.o. twice a day without difficulty. Agnes Escalera returns today in follow-up for evaluation, lab monitoring, side effect monitoring and for further treatment recommendations. Agnes Escalera was recently hospitalized at [facility]) from 9/23/2020-9/30/2020 for treatment of COVID-19 infection. She required critical care monitoring and oxygen support with BiPAP. She presents today indicating that she is feeling somewhat better although is significantly fatigued with generalized weakness and shortness of air with exertion. She denies any febrile illness or other infectious symptoms. CBC today reveals hemoglobin of 10.6, MCV 95.4 and a platelet count of 504,157 with a normal WBC 5.26 and ANC of 2.67. 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 Roger Finn?    Eladio for a total of 1000 mg completed on 7/29/2018   Ferrous sulfate 325 mg PO twice a day , 8/15/2018-May 2020  Ferrous gluconate 240 mg p.o. 3 times daily initiated in May 2020    Hematology history #1- Recurrent DVT and history of pulmonary emboli  Agnes Escalera was seen in initial consultation on 7/26/2018 during her hospitalization at [facility] for recurrent DVT and anemia. She has a history of pulmonary emboli and DVTand has been followed in the past by Dr. Arcadio Tucker. Serology studies in September 2007 documented ANÍBAL negative and factor II analysis prothrombin gene mutation was negative. She has an IVC filter in place. Rajni Zacarias presented to the ER at [facility] on 7/24/2018 with complaints of significant left lower extremity edema and pain that had been persistent for approximately one week prior to presentation. Rajni Zacarias has been on chronic anticoagulation with warfarin since 2003 and was subtherapeutic at presentation with an INR of 1.27 on 7/24/2018. Review of INRs available through Epic dating back to 1/31/2018 to 7/24/2018 indicated Rajni Zacarias has remained routinely subtherapeutic. She reported financial restraint and was unable to obtain warfarin. During her hospitalization, she was placed on a heparin drip and bridged with warfarin. LOWER EXTREMITY BILATERAL VENOUS DUPLEX On 7/25/2018 - chronic deep vein thrombosis in the right lower extremity involving the femoral and popliteal, veins. Lupus anticoagulant not detected on 7/26/2018. Recurrent DVT to Left lower extremity occurred most likely due to subtherapeutic INR related to noncompliance, do not suspect warfarin failure. Rajni Zacarias indicates that she monitors her PT/INR at home and calls Dr. Buck Query office with the results for dosing changes related to her warfarin. Hematology history #2- Iron deficiency anemia  Rajni Zacarias has a history of iron deficiency anemia dating back to  1993. Her deficiency may also be exacerbated by absorption, she had aRouxen-Y gastric bypass surgery 9/12/2003. She has received IV iron replacement with Venofer on several occasions under the direction of Dr. Felicita Brown. Rajni Zacarias had a bone marrow aspiration and biopsy was completed on 8/31/2007 documented left shifted myeloid maturation and dysmaturation. 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 Normocellular bone marrow for age.  Decreased storage iron and no ringed sideroblasts. Moderate increase of reticulin fibers. Vandana Nice had an upper endoscopy on 8/10/2015 by Dr. Sofy Kelly that identified a normal postoperative Gage en Y gastric bypass anatomy. A marginal ulcer with no bleeding tendencies noted. Serology studies on 7/26/2018:  Iron 41   Iron saturation 13%   Ferritin 97.4   TIBC 316   Reticulocyte count 0.0521      Haptoglobin <10   Hemoglobin 8.7 and hematocrit 27.8    Leny received Venofer for a total of 1000 mg, completed on 7/29/2018.     Serology studies on 5/6/2019, Obtained by Dr. Palacios Reasons:  Iron 54   B12 876   Folate 7.61   Copper 135     Serology studies on 11/22/2019:  · Iron 74  · Iron saturation 20.1%  · TIBC is 368  · Ferritin 88  · Folate 16  · Hemoglobin 11.4/MCV 92.0    Past Medical History:    Past Medical History:   Diagnosis Date    Abdominal pain     Abnormal EKG     Acute sinusitis     Allergic reaction to spider bite     Anemia     Anticoagulated     Anxiety     Arrhythmia     Asthmatic bronchitis without complication 0/60/4996    Ataxic gait     Lyons's esophagus     Bowel obstruction (HCC)     Callus     Cardiac pacemaker     CKD (chronic kidney disease) stage 2, GFR 60-89 ml/min     Coat's syndrome     Coat's syndrome     both eyes    Depression     Diabetes mellitus type 2 in nonobese (HCC)     Diabetic nephropathy (HCC)     Disequilibrium     Dizziness     DVT (deep venous thrombosis) (HCC)     Exudative retinopathy     Fibromyalgia     Fibromyositis     Gastric ulcer     GERD (gastroesophageal reflux disease)     History of gastric bypass     Hx of lupus anticoagulant disorder     Hypertension     Hypothyroidism     Intermittent claudication (HCC)     Intestinal obstruction (HCC)     Iron deficiency     Left-sided weakness     Low vitamin D level     Lupus (HCC)     Menopause     Obesity     Osteoarthritis     Osteoporosis     Other iron deficiency anemias     Palliative care patient 09/24/2020    Pernicious anemia     PUPP (pruritic urticarial papules and plaques of pregnancy)     Right leg numbness     Right sided sciatica     Sarcoidosis     with liver involvement    Sciatica     Secondary hyperparathyroidism ([address]     Shingles     Shortness of breath     Sleep apnea     Stomach ulcer     Syncope     Type II diabetes mellitus with nephropathy (HCC)     Visual loss, one eye        Past Surgical History:    Past Surgical History:   Procedure Laterality Date    APPENDECTOMY      CARDIAC CATHETERIZATION  [facility]21/13  [facility]    EF over 60%    CHOLECYSTECTOMY      COLONOSCOPY  2/2010    negative    COLONOSCOPY  2/22/10    Dr Georgie Silva    COLONOSCOPY  4/1/16    Dr LAKHWINDER Horvath-internal hemorrhoids, 5 yr recall    EYE SURGERY      Cyst on Right eye    EYE SURGERY      GASTRIC BYPASS SURGERY      GASTRIC BYPASS SURGERY      HERNIA REPAIR      HYSTERECTOMY      Complete    HYSTERECTOMY      Partial - because had a tubal pregnancy.      INCONTINENCE SURGERY      Bladder Sling    OTHER SURGICAL HISTORY      IVC filter    PACEMAKER INSERTION      PACEMAKER PLACEMENT      medtronic    OK TOTAL KNEE ARTHROPLASTY Right 3/26/2018    TOTAL KNEE REPLACEMENT COMPLEX PRIMARY performed by Yen Johnson MD at [address] [place] [address]      SPLENECTOMY      KRISTEL AND BSO      TONSILLECTOMY AND ADENOIDECTOMY      UPPER GASTROINTESTINAL ENDOSCOPY  12/2011    gerd s/p gastric bypass    UPPER GASTROINTESTINAL ENDOSCOPY  2/2014    normal s/p gastric bypass    UPPER GASTROINTESTINAL ENDOSCOPY  2/2010    biopsy neg Barretts, chronic reflux esophagitis s/p gastric bypass    UPPER GASTROINTESTINAL ENDOSCOPY  7/2006    unremarkable s/p gastric bypass    UPPER GASTROINTESTINAL ENDOSCOPY  8/10/15    Dr Rubio [facility] UPPER GASTROINTESTINAL ENDOSCOPY  4/1/16    Dr Luis Cortez ENDOSCOPY N/A 4/1/2016    EGD ESOPHAGOGASTRODUODENOSCOPY performed by Emi Riley MD at [facility]    [facility] Right 2003       Current Medications:    Current Outpatient Medications   Medication Sig Dispense Refill    potassium chloride (KLOR-CON) 10 MEQ extended release tablet       warfarin (COUMADIN) 7.5 MG tablet Take 2 tablets by mouth daily 2 tablets daily, DO NOT RESUME UNTIL YOU RECHECK YOUR INR ON THURSDAY AND DISCUSS WITH PCP 60 tablet 0    gabapentin (NEURONTIN) 100 MG capsule Take 1 capsule by mouth 3 times daily for 90 days. 270 capsule 0    baclofen (LIORESAL) 10 MG tablet Take 1 tablet by mouth 3 times daily As needed for hip pain 10 tablet 1    vitamin D (ERGOCALCIFEROL) 1.25 MG (34846 UT) CAPS capsule Take 1 capsule by mouth Twice a Week 12 capsule 1    calcipotriene (DOVONEX) 0.005 % cream Use topically as needed 3 Tube 3    clobetasol (TEMOVATE) 0.05 % cream Apply topically 2 times daily. 3 Tube 3    fexofenadine (ALLEGRA) 180 MG tablet Take 1 tablet by mouth daily Indications:  Allergic Rhinitis 90 tablet 3    pantoprazole (PROTONIX) 40 MG tablet Take 1 tablet by mouth 2 times daily (before meals) 60 tablet 3    levothyroxine (SYNTHROID) 100 MCG tablet Take 1 tablet by mouth Daily 90 tablet 3    ondansetron (ZOFRAN) 4 MG tablet Take 1 tablet by mouth daily as needed for Nausea or Vomiting 30 tablet 0    cloNIDine (CATAPRES) 0.1 MG tablet Take 1 tablet by mouth 2 times daily As needed for BP greater than 170/110 60 tablet 3    Multiple Vitamins-Minerals (CENTRUM ADULTS) TABS Take 1 tablet by mouth daily      escitalopram (LEXAPRO) 20 MG tablet Take 1 tablet by mouth daily 90 tablet 2    lisinopril (PRINIVIL;ZESTRIL) 40 MG tablet Take 1 tablet by mouth daily 90 tablet 0    bumetanide (BUMEX) 2 MG tablet Take 1 tablet by mouth daily 30 tablet 5    tiotropium (SPIRIVA HANDIHALER) 18 MCG inhalation capsule Inhale 1 capsule into the lungs daily 90 capsule 1    potassium chloride (KLOR-CON M) 10 MEQ extended release tablet Take 1 tablet by mouth daily 90 tablet 3    ferrous gluconate (FERGON) 240 (27 Fe) MG tablet Take 1 tablet by mouth 3 times daily (with meals) (Patient taking differently: Take 240 mg by mouth 2 times daily ) 270 tablet 2    aspirin 81 MG tablet Take 81 mg by mouth daily      cyanocobalamin 1000 MCG/ML injection Inject 1 mL into the muscle once for 1 dose 1 mL 11     Current Facility-Administered Medications   Medication Dose Route Frequency Provider Last Rate Last Dose    cyanocobalamin injection 1,000 mcg  1,000 mcg Intramuscular Once Ishan Toribio MD            Allergies:    Allergies   Allergen Reactions    Insect Extract Allergy Skin Test Anaphylaxis     Bee stings    Lactose Intolerance (Gi)     Prednisone      Headache and upset stomach    Zanaflex [Tizanidine Hcl]      Passed out lost control of body functions    Lortab [Hydrocodone-Acetaminophen] Nausea And Vomiting     Sweats, weak, nausea and vomiting    Other Nausea And Vomiting     Opiates------sweating, weak        Oxycodone-Acetaminophen Nausea And Vomiting     Sweating and vomiting     Ultram [Tramadol Hcl] Nausea And Vomiting     Sweating, weak, nausea and vomiting         Social History:    Social History     Tobacco Use    Smoking status: Never Smoker    Smokeless tobacco: Never Used   Substance Use Topics    Alcohol use: No    Drug use: No       Family History:   Family History   Problem Relation Age of Onset    Uterine Cancer Mother     Cervical Cancer Mother     Coronary Art Dis Mother     Heart Disease Father     Lung Cancer Father     Other Father         renal failure    Colon Cancer Brother     Colon Polyps Brother     Uterine Cancer Maternal Grandmother     Cervical Cancer Maternal Grandmother     Diabetes Maternal Grandmother     Cervical Cancer Sister     Other Sister         fibromyalgia    Diabetes Paternal Aunt     Esophageal Cancer Neg Hx     Liver Cancer Neg Hx     Liver Disease Neg Hx     Stomach Cancer Neg Hx     Rectal Cancer Neg Hx        Vitals:  Vitals:    10/28/20 1036   BP: (!) 160/92   Pulse: 82   Temp: 96.8 °F (36 °C)   TempSrc: Temporal   SpO2: 95%   Weight: 231 lb 1.6 oz (104.8 kg)   Height: 5' 6\" (1.676 m)        Subjective   REVIEW OF SYSTEMS:   Review of Systems   Constitutional: Positive for fatigue. Negative for chills, diaphoresis and fever. HENT: Negative. Negative for congestion, ear pain, hearing loss, nosebleeds, sore throat and tinnitus. Eyes: Negative. Negative for pain, discharge and redness. Respiratory: Positive for shortness of breath (With exertion). Negative for cough and wheezing. Cardiovascular: Positive for leg swelling (Chronic bilateral). Negative for chest pain and palpitations. Gastrointestinal: Negative. Negative for abdominal pain, blood in stool, constipation, diarrhea, nausea and vomiting. Endocrine: Negative for polydipsia. Genitourinary: Negative for dysuria, flank pain, frequency, hematuria and urgency. Musculoskeletal: Negative. Negative for back pain, myalgias and neck pain. Skin: Negative. Negative for rash. Neurological: Positive for weakness. Negative for dizziness, tremors, seizures and headaches. Hematological: Does not bruise/bleed easily. Psychiatric/Behavioral: Negative. The patient is not nervous/anxious. Objective   PHYSICAL EXAM:  Physical Exam  Vitals signs reviewed. Constitutional:       General: She is not in acute distress. Appearance: She is well-developed. She is not diaphoretic. HENT:      Head: Normocephalic and atraumatic. Mouth/Throat:      Pharynx: Uvula midline. Tonsils: No tonsillar exudate. Eyes:      General: Lids are normal. No scleral icterus. Right eye: No discharge. Left eye: No discharge. Conjunctiva/sclera: Conjunctivae normal.      Pupils: Pupils are equal, round, and reactive to light.    Neck:      Musculoskeletal: Normal range of motion and neck supple. Thyroid: No thyroid mass or thyromegaly. Vascular: No JVD. Trachea: Trachea normal. No tracheal deviation. Cardiovascular:      Rate and Rhythm: Normal rate and regular rhythm. Heart sounds: Normal heart sounds. No murmur. No friction rub. No gallop. Pulmonary:      Effort: Pulmonary effort is normal. No respiratory distress. Breath sounds: Normal breath sounds. No wheezing or rales. Comments: Faint crackles in right lower lobe  Chest:      Chest wall: No tenderness. Abdominal:      General: Bowel sounds are normal. There is no distension. Palpations: Abdomen is soft. There is no mass. Tenderness: There is no abdominal tenderness. There is no guarding or rebound. Hernia: No hernia is present. Musculoskeletal:         General: No tenderness or deformity. Right lower leg: Edema (Chronic) present. Left lower leg: Edema (Chronic) present. Comments: Range of motion within normal limits x4 extremities   Skin:     General: Skin is warm. Coloration: Skin is not pale. Findings: No erythema or rash. Neurological:      Mental Status: She is alert and oriented to person, place, and time. Cranial Nerves: No cranial nerve deficit. Coordination: Coordination normal.   Psychiatric:         Behavior: Behavior normal.         Thought Content: Thought content normal.         Labs:  CBC: hemoglobin of 10.6, MCV 95.4 and a platelet count of 950,556 with a normal WBC 5.26 and ANC of 2.67.        ASSESSMENT/PLAN:      1. Iron deficiency anemia with concern for malabsorption, hemoglobin declined to 10.6 with MCV 95.4. Continues to take ferrous gluconate 240 mg p.o. 3 times daily. Will reevaluate iron substrates today. Labs to be obtained today:  - Iron and TIBC; Future  - Ferritin; Future  - Vitamin B12; Future  - Comprehensive Metabolic Panel; Future    2.  Anticoagulated on Coumadin, due to history of recurrent DVTs and pulmonary emboli. Coumadin is currently managed by Dr. Obed Acosta. Reports recently having a supratherapeutic INR of 5.4, Coumadin was held with a repeat INR of 1.3 and resumed dosing at 7.5 mg   -Continue chronic anticoagulation management under the direction of Dr. Obed Acosta with a goal INR between 2-3       3. Chronic thrombocytopenia that waxes and wanes. Platelet count stable at 151,000 today. Denies any bleeding or excessive bruising.    -Continue with conservative monitoring. 4.  History of hypothyroidism, on Synthroid 1000 mcg daily. Managed by Dr. Obed Acosta. FOLLOW UP:  1. Follow-up appointment given for 4 months, sooner if needed  2. Continue to follow closely with Dr. Obed Acosta and other medical providers as recommended      Over 50% of the total visit time of 15 minutes in face to face encounter with the patient, out of which more than 50% of the time was spent in counseling patient  and coordination of care. Counseling included but was not limited to time spent reviewing labs, imaging studies/ treatment plan and answering questions. This does not include charting. Discussed precautions related to [address] and being at increased risk. Discussed proper handwashing to be done frequently, limit exposure to other individuals and maintain social distancing of 6 feet. Recommend contacting primary care provider if having respiratory symptoms for further recommendations and consideration for testing.     (Please note that portions of this note were completed with a voice recognition program. Efforts were made to edit the dictations but occasionally words are mis-transcribed.)    Electronically signed by CHANELLE Ortiz on 11/2/2020 at 6:13 PM

## 2020-10-31 ENCOUNTER — CARE COORDINATION (OUTPATIENT)
Dept: CASE MANAGEMENT | Age: 70
End: 2020-10-31

## 2020-10-31 NOTE — CARE COORDINATION
[facility] Transitions Follow Up Call    10/31/2020    Patient: Danny Jones  Patient : 1950   MRN: <[id number]>  Reason for Admission: covid19  Discharge Date: 20 RARS: Readmission Risk Score: 29      Spoke with: [address] Transitions Subsequent and Final Call    Subsequent and Final Calls  Do you have any ongoing symptoms?:  No  Have your medications changed?:  No  Do you have any questions related to your medications?:  No  Do you currently have any active services?:  No  Do you have any needs or concerns that I can assist you with?:  No  Care Transitions Interventions  Other Interventions:        States she has occasional SOB when she overexerts herself and she sometimes gets tired. Denies fever, flu-like symptoms, or other symptoms. States she's taking medications as prescribed and denies questions or concerns at this time.       Follow Up  Future Appointments   Date Time Provider Jose Shea   2020 10:00 AM MD HIPOLITO Salazar [facility]   2021 10:10 AM SCHEDULE, L MED ONC MA L MED ONC Shannan BOOTH   2021 10:30 AM [facility]ANELLE Rodriguez [facility]       Peri Azevedo

## 2020-11-02 ASSESSMENT — ENCOUNTER SYMPTOMS: SHORTNESS OF BREATH: 1

## 2020-11-05 ENCOUNTER — ANTI-COAG VISIT (OUTPATIENT)
Dept: PRIMARY CARE CLINIC | Age: 70
End: 2020-11-05
Payer: MEDICARE

## 2020-11-05 DIAGNOSIS — E53.8 B12 DEFICIENCY: ICD-10-CM

## 2020-11-05 DIAGNOSIS — E55.9 VITAMIN D DEFICIENCY: ICD-10-CM

## 2020-11-05 DIAGNOSIS — E11.9 TYPE 2 DIABETES MELLITUS WITHOUT COMPLICATION, UNSPECIFIED WHETHER LONG TERM INSULIN USE (HCC): ICD-10-CM

## 2020-11-05 LAB
ALBUMIN SERPL-MCNC: 4.2 G/DL (ref 3.5–5.2)
ALP BLD-CCNC: 65 U/L (ref 35–104)
ALT SERPL-CCNC: 9 U/L (ref 5–33)
ANION GAP SERPL CALCULATED.3IONS-SCNC: 14 MMOL/L (ref 7–19)
AST SERPL-CCNC: 22 U/L (ref 5–32)
BASOPHILS ABSOLUTE: 0 K/UL (ref 0–0.2)
BASOPHILS RELATIVE PERCENT: 0.7 % (ref 0–1)
BILIRUB SERPL-MCNC: 0.5 MG/DL (ref 0.2–1.2)
BUN BLDV-MCNC: 19 MG/DL (ref 8–23)
CALCIUM SERPL-MCNC: 9.1 MG/DL (ref 8.8–10.2)
CHLORIDE BLD-SCNC: 103 MMOL/L (ref 98–111)
CHOLESTEROL, TOTAL: 247 MG/DL (ref 160–199)
CO2: 24 MMOL/L (ref 22–29)
CREAT SERPL-MCNC: 1.1 MG/DL (ref 0.5–0.9)
CREATININE URINE: 130.5 MG/DL (ref 4.2–622)
EOSINOPHILS ABSOLUTE: 0.2 K/UL (ref 0–0.6)
EOSINOPHILS RELATIVE PERCENT: 3.4 % (ref 0–5)
GFR AFRICAN AMERICAN: >59
GFR NON-AFRICAN AMERICAN: 49
GLUCOSE BLD-MCNC: 87 MG/DL (ref 74–109)
HBA1C MFR BLD: 5.7 % (ref 4–6)
HCT VFR BLD CALC: 36.6 % (ref 37–47)
HDLC SERPL-MCNC: 92 MG/DL (ref 65–121)
HEMOGLOBIN: 11.4 G/DL (ref 12–16)
IMMATURE GRANULOCYTES #: 0 K/UL
INTERNATIONAL NORMALIZATION RATIO, POC: 2.6
LDL CHOLESTEROL CALCULATED: 142 MG/DL
LYMPHOCYTES ABSOLUTE: 1.7 K/UL (ref 1.1–4.5)
LYMPHOCYTES RELATIVE PERCENT: 38.5 % (ref 20–40)
MCH RBC QN AUTO: 29.6 PG (ref 27–31)
MCHC RBC AUTO-ENTMCNC: 31.1 G/DL (ref 33–37)
MCV RBC AUTO: 95.1 FL (ref 81–99)
MICROALBUMIN UR-MCNC: 2.1 MG/DL (ref 0–19)
MICROALBUMIN/CREAT UR-RTO: 16.1 MG/G
MONOCYTES ABSOLUTE: 0.5 K/UL (ref 0–0.9)
MONOCYTES RELATIVE PERCENT: 11.7 % (ref 0–10)
NEUTROPHILS ABSOLUTE: 2 K/UL (ref 1.5–7.5)
NEUTROPHILS RELATIVE PERCENT: 45.5 % (ref 50–65)
PDW BLD-RTO: 18 % (ref 11.5–14.5)
PLATELET # BLD: 153 K/UL (ref 130–400)
PMV BLD AUTO: 12.5 FL (ref 9.4–12.3)
POTASSIUM SERPL-SCNC: 4 MMOL/L (ref 3.5–5)
RBC # BLD: 3.85 M/UL (ref 4.2–5.4)
SODIUM BLD-SCNC: 141 MMOL/L (ref 136–145)
TOTAL PROTEIN: 8.2 G/DL (ref 6.6–8.7)
TRIGL SERPL-MCNC: 66 MG/DL (ref 0–149)
TSH SERPL DL<=0.05 MIU/L-ACNC: 3.51 UIU/ML (ref 0.27–4.2)
VITAMIN B-12: 796 PG/ML (ref 211–946)
VITAMIN D 25-HYDROXY: 27.2 NG/ML
WBC # BLD: 4.4 K/UL (ref 4.8–10.8)

## 2020-11-05 PROCEDURE — 93793 ANTICOAG MGMT PT WARFARIN: CPT | Performed by: INTERNAL MEDICINE

## 2020-11-05 PROCEDURE — 85610 PROTHROMBIN TIME: CPT | Performed by: INTERNAL MEDICINE

## 2020-11-12 ENCOUNTER — ANTI-COAG VISIT (OUTPATIENT)
Dept: INTERNAL MEDICINE | Age: 70
End: 2020-11-12
Payer: MEDICARE

## 2020-11-12 LAB — INTERNATIONAL NORMALIZATION RATIO, POC: 2.6

## 2020-11-12 PROCEDURE — 93793 ANTICOAG MGMT PT WARFARIN: CPT | Performed by: INTERNAL MEDICINE

## 2020-11-12 PROCEDURE — 85610 PROTHROMBIN TIME: CPT | Performed by: INTERNAL MEDICINE

## 2020-11-12 NOTE — PROGRESS NOTES
Ms. Naveen Rebollar was here today. INR today:   Results for orders placed or performed in visit on 11/12/20   POCT INR   Result Value Ref Range    INR 2.6      INR Goal: 2.0-3.0    Dosing Plan  As of 11/12/2020    TTR:   18.0 % (2.5 y)   Full warfarin instructions:   7.5 mg every day            PLAN: CONTINUE CURRENT DOSE  NEXT COUMADIN CLINIC APT IS: 12/10/2020  WE WILL BE ORDERING HER A NEW MACHINE FOR HOME TESTING. Coumadin Clinic Hours  Tuesday 7:30am - 4:00pm  Wednesday 7:30am - 4:00pm  Thursday 7:30am - 4:00pm    IF IT'S AN EMERGENCY, PLEASE CALL 911 OR GO TO YOUR NEAREST EMERGENCY ROOM. [facility] INTERNAL MEDICINE COUMADIN CLINIC [phone]  IF UNABLE TO REACH COUMADIN CLINIC, [address] [place] [address] Rd, [phone]. Electronically signed by Daniele Horta MD on 11/12/2020 at 9:21 AM    I have reviewed nursing plan for Coumadin management and agree with plan.

## 2020-11-13 ENCOUNTER — CARE COORDINATION (OUTPATIENT)
Dept: CARE COORDINATION | Age: 70
End: 2020-11-13

## 2020-11-13 NOTE — CARE COORDINATION
[facility] Transitions Follow Up Call    2020    Patient: Amy Guillen  Patient : 1950   MRN: <[id number]>  Reason for Admission:   Discharge Date: 20 RARS: Readmission Risk Score: 34         Spoke with: Patient    Patient states she still feels a little tired. Still has non productive cough - encouraged to increase fluid intake. Patient expresses understanding. Gets SOB with exertion at times. Denies fever and chest pain. Sees PCP 2020. Patient has all medications is taking medications as directed and has no questions concerning medications at this time. Patient has no needs or concerns for writer at this time. Patient/ Family knows when to contact physician with any new or worsening symptoms, changes or concerns or report to ED with worsening or severe symptoms. Will continue to follow. Barbara England LPN    [phone]  [address] [place] [address] / [address] Transitions Subsequent and Final Call    Subsequent and Final Calls  Do you have any ongoing symptoms?:  Yes  Patient-reported symptoms:  Cough, Shortness of Breath  Have your medications changed?:  No  Do you have any questions related to your medications?:  No  Do you currently have any active services?:  No  Do you have any needs or concerns that I can assist you with?:  No  Identified Barriers:  None  Care Transitions Interventions  Other Interventions:             Follow Up  Future Appointments   Date Time Provider Jose Shea   2020 10:00 AM MD HIPOLITO EscobedoP-[place]   2021 10:10 AM SCHEDULE, L MED ONC MA L MED ONC Shannan BOOTH   2021 10:30 AM Malou Turpin, [address] [place] [address]       Barbara England LPN

## 2020-11-20 ENCOUNTER — OFFICE VISIT (OUTPATIENT)
Dept: INTERNAL MEDICINE | Age: 70
End: 2020-11-20
Payer: MEDICARE

## 2020-11-20 VITALS
SYSTOLIC BLOOD PRESSURE: 138 MMHG | HEART RATE: 84 BPM | HEIGHT: 67 IN | DIASTOLIC BLOOD PRESSURE: 82 MMHG | WEIGHT: 225 LBS | BODY MASS INDEX: 35.31 KG/M2 | OXYGEN SATURATION: 99 %

## 2020-11-20 PROCEDURE — 1123F ACP DISCUSS/DSCN MKR DOCD: CPT | Performed by: INTERNAL MEDICINE

## 2020-11-20 PROCEDURE — 4040F PNEUMOC VAC/ADMIN/RCVD: CPT | Performed by: INTERNAL MEDICINE

## 2020-11-20 PROCEDURE — G8417 CALC BMI ABV UP PARAM F/U: HCPCS | Performed by: INTERNAL MEDICINE

## 2020-11-20 PROCEDURE — 1090F PRES/ABSN URINE INCON ASSESS: CPT | Performed by: INTERNAL MEDICINE

## 2020-11-20 PROCEDURE — 1036F TOBACCO NON-USER: CPT | Performed by: INTERNAL MEDICINE

## 2020-11-20 PROCEDURE — 96372 THER/PROPH/DIAG INJ SC/IM: CPT | Performed by: INTERNAL MEDICINE

## 2020-11-20 PROCEDURE — 2022F DILAT RTA XM EVC RTNOPTHY: CPT | Performed by: INTERNAL MEDICINE

## 2020-11-20 PROCEDURE — G8484 FLU IMMUNIZE NO ADMIN: HCPCS | Performed by: INTERNAL MEDICINE

## 2020-11-20 PROCEDURE — G8427 DOCREV CUR MEDS BY ELIG CLIN: HCPCS | Performed by: INTERNAL MEDICINE

## 2020-11-20 PROCEDURE — G8399 PT W/DXA RESULTS DOCUMENT: HCPCS | Performed by: INTERNAL MEDICINE

## 2020-11-20 PROCEDURE — 3044F HG A1C LEVEL LT 7.0%: CPT | Performed by: INTERNAL MEDICINE

## 2020-11-20 PROCEDURE — 3017F COLORECTAL CA SCREEN DOC REV: CPT | Performed by: INTERNAL MEDICINE

## 2020-11-20 PROCEDURE — 99214 OFFICE O/P EST MOD 30 MIN: CPT | Performed by: INTERNAL MEDICINE

## 2020-11-20 RX ORDER — CYANOCOBALAMIN 1000 UG/ML
1000 INJECTION INTRAMUSCULAR; SUBCUTANEOUS ONCE
Status: COMPLETED | OUTPATIENT
Start: 2020-11-20 | End: 2020-11-20

## 2020-11-20 RX ADMIN — CYANOCOBALAMIN 1000 MCG: 1000 INJECTION INTRAMUSCULAR; SUBCUTANEOUS at 10:33

## 2020-11-20 ASSESSMENT — PATIENT HEALTH QUESTIONNAIRE - PHQ9
SUM OF ALL RESPONSES TO PHQ QUESTIONS 1-9: 0
SUM OF ALL RESPONSES TO PHQ QUESTIONS 1-9: 0
2. FEELING DOWN, DEPRESSED OR HOPELESS: 0
SUM OF ALL RESPONSES TO PHQ9 QUESTIONS 1 & 2: 0
1. LITTLE INTEREST OR PLEASURE IN DOING THINGS: 0
SUM OF ALL RESPONSES TO PHQ QUESTIONS 1-9: 0

## 2020-11-20 NOTE — PROGRESS NOTES
Dr Dimitri Horvath-internal hemorrhoids, 5 yr recall    EYE SURGERY      Cyst on Right eye    EYE SURGERY      GASTRIC BYPASS SURGERY      GASTRIC BYPASS SURGERY      HERNIA REPAIR      HYSTERECTOMY      Complete    HYSTERECTOMY      Partial - because had a tubal pregnancy.      INCONTINENCE SURGERY      Bladder Sling    OTHER SURGICAL HISTORY      IVC filter    PACEMAKER INSERTION      PACEMAKER PLACEMENT      medtr[employer]    TX TOTAL KNEE ARTHROPLASTY Right 3/26/2018    TOTAL KNEE REPLACEMENT COMPLEX PRIMARY performed by Jeyson Payton MD at [address] [place] [address]      SPLENECTOMY      KRISTEL AND BSO      TONSILLECTOMY AND ADENOIDECTOMY      UPPER GASTROINTESTINAL ENDOSCOPY  12/2011    gerd s/p gastric bypass    UPPER GASTROINTESTINAL ENDOSCOPY  2/2014    normal s/p gastric bypass    UPPER GASTROINTESTINAL ENDOSCOPY  2/2010    biopsy neg Barretts, chronic reflux esophagitis s/p gastric bypass    UPPER GASTROINTESTINAL ENDOSCOPY  7/2006    unremarkable s/p gastric bypass    UPPER GASTROINTESTINAL ENDOSCOPY  8/10/15    Dr Shabnam Virk UPPER GASTROINTESTINAL ENDOSCOPY  4/1/16    Dr Booker Galo    UPPER GASTROINTESTINAL ENDOSCOPY N/A 4/1/2016    EGD ESOPHAGOGASTRODUODENOSCOPY performed by Kushal Orozco MD at [facility]    [facility] Right 2003      Social History     Socioeconomic History    Marital status:      Spouse name: None    Number of children: None    Years of education: None    Highest education level: None   Occupational History     Employer: [employer]    Social Needs    Financial resource strain: None    Food insecurity     Worry: None     Inability: None    Transportation needs     Medical: None     Non-medical: None   Tobacco Use    Smoking status: Never Smoker    Smokeless tobacco: Never Used   Substance and Sexual Activity    Alcohol use: No    Drug use: No    Sexual activity: Not Currently   Lifestyle    Physical activity     Days per week: None     Minutes per session: None    Stress: None   Relationships    Social connections     Talks on phone: None     Gets together: None     Attends Orthodoxy service: None     Active member of club or organization: None     Attends meetings of clubs or organizations: None     Relationship status: None    Intimate partner violence     Fear of current or ex partner: None     Emotionally abused: None     Physically abused: None     Forced sexual activity: None   Other Topics Concern    None   Social History Narrative    None      Family History   Problem Relation Age of Onset    Uterine Cancer Mother     Cervical Cancer Mother     Coronary Art Dis Mother     Heart Disease Father     Lung Cancer Father     Other Father         renal failure    Colon Cancer Brother     Colon Polyps Brother     Uterine Cancer Maternal Grandmother     Cervical Cancer Maternal Grandmother     Diabetes Maternal Grandmother     Cervical Cancer Sister     Other Sister         fibromyalgia    Diabetes Paternal Aunt     Esophageal Cancer Neg Hx     Liver Cancer Neg Hx     Liver Disease Neg Hx     Stomach Cancer Neg Hx     Rectal Cancer Neg Hx         Current Outpatient Medications   Medication Sig Dispense Refill    potassium chloride (KLOR-CON) 10 MEQ extended release tablet       gabapentin (NEURONTIN) 100 MG capsule Take 1 capsule by mouth 3 times daily for 90 days. 270 capsule 0    baclofen (LIORESAL) 10 MG tablet Take 1 tablet by mouth 3 times daily As needed for hip pain 10 tablet 1    vitamin D (ERGOCALCIFEROL) 1.25 MG (51261 UT) CAPS capsule Take 1 capsule by mouth Twice a Week 12 capsule 1    calcipotriene (DOVONEX) 0.005 % cream Use topically as needed 3 Tube 3    clobetasol (TEMOVATE) 0.05 % cream Apply topically 2 times daily. 3 Tube 3    fexofenadine (ALLEGRA) 180 MG tablet Take 1 tablet by mouth daily Indications:  Allergic Rhinitis 90 tablet 3    pantoprazole (PROTONIX) 40 MG tablet Take 1 tablet by mouth 2 times daily (before meals) 60 tablet 3    levothyroxine (SYNTHROID) 100 MCG tablet Take 1 tablet by mouth Daily 90 tablet 3    ondansetron (ZOFRAN) 4 MG tablet Take 1 tablet by mouth daily as needed for Nausea or Vomiting 30 tablet 0    cloNIDine (CATAPRES) 0.1 MG tablet Take 1 tablet by mouth 2 times daily As needed for BP greater than 170/110 60 tablet 3    Multiple Vitamins-Minerals (CENTRUM ADULTS) TABS Take 1 tablet by mouth daily      escitalopram (LEXAPRO) 20 MG tablet Take 1 tablet by mouth daily 90 tablet 2    lisinopril (PRINIVIL;ZESTRIL) 40 MG tablet Take 1 tablet by mouth daily 90 tablet 0    bumetanide (BUMEX) 2 MG tablet Take 1 tablet by mouth daily 30 tablet 5    tiotropium (SPIRIVA HANDIHALER) 18 MCG inhalation capsule Inhale 1 capsule into the lungs daily 90 capsule 1    ferrous gluconate (FERGON) 240 (27 Fe) MG tablet Take 1 tablet by mouth 3 times daily (with meals) (Patient taking differently: Take 240 mg by mouth 2 times daily ) 270 tablet 2    aspirin 81 MG tablet Take 81 mg by mouth daily      warfarin (COUMADIN) 7.5 MG tablet Take 2 tablets by mouth daily 2 tablets daily, DO NOT RESUME UNTIL YOU RECHECK YOUR INR ON THURSDAY AND DISCUSS WITH PCP 60 tablet 0     Current Facility-Administered Medications   Medication Dose Route Frequency Provider Last Rate Last Dose    cyanocobalamin injection 1,000 mcg  1,000 mcg Intramuscular Once Elida MD Roshni            Patient Active Problem List   Diagnosis    Vomiting    Burping    GERD (gastroesophageal reflux disease)    History of gastric bypass    Family history of colon cancer    Bloating    Enuresis    Nausea and vomiting    Stable angina ([address]    Encounter for current long-term use of anticoagulants    Primary osteoarthritis of right knee    Arthritis of knee    Essential hypertension    Hyperglycemia    MER (obstructive sleep apnea)    Slow transit constipation    Iron deficiency anemia    Restrictive airway disease    DVT, lower extremity, proximal, acute, unspecified laterality ([address]    H/O systemic lupus erythematosus (SLE) (HCC)    Anticoagulated on Coumadin    Burn of abdomen wall, second degree, initial encounter    Postmenopausal osteoporosis    Type II diabetes mellitus with nephropathy (HCC)    Cellulitis of left lower extremity    Pacemaker    History of DVT (deep vein thrombosis)    Lupus anticoagulant disorder (HCC)    History of pulmonary embolism    Thrombocytopenia ([address]    Hypothyroidism    Morbidly obese ([address]    Asthmatic bronchitis without complication    Gastroenteritis    Colic    Abdominal pain    Intractable nausea and vomiting    Severe episode of recurrent major depressive disorder, without psychotic features ([address]    COVID-19    Generalized weakness    Accidental fall from bed    Palliative care patient        Review of Systems   Constitutional: Positive for fatigue. Negative for activity change, appetite change, chills, diaphoresis, fever and unexpected weight change. HENT: Negative for congestion, ear pain, hearing loss, nosebleeds, postnasal drip, rhinorrhea, sinus pressure, sinus pain, sneezing, sore throat, tinnitus, trouble swallowing and voice change. Eyes: Negative for discharge and itching. Watery eyes   Respiratory: Positive for shortness of breath. Negative for apnea, cough, chest tightness, wheezing and stridor. Improving, but she did have a recent COVID-19 infection. Cardiovascular: Positive for leg swelling. Negative for chest pain and palpitations. Left leg swelling; chronic secondary to DVT. Swelling to her bilateral lower extremities   Gastrointestinal: Negative for abdominal distention, abdominal pain, blood in stool, constipation, diarrhea, nausea and vomiting. Endocrine: Negative for cold intolerance, heat intolerance, polydipsia, polyphagia and polyuria.    Genitourinary: Negative for difficulty urinating, dysuria, flank pain, frequency, hematuria and urgency. Musculoskeletal: Positive for arthralgias and back pain. Negative for gait problem, joint swelling, myalgias, neck pain and neck stiffness. She had bilateral knee pain. Right hip pain   Skin: Negative for color change, pallor, rash and wound. Allergic/Immunologic: Positive for environmental allergies. Negative for food allergies and immunocompromised state. Neurological: Negative for dizziness, tremors, seizures, syncope, facial asymmetry, speech difficulty, weakness, light-headedness, numbness and headaches. Hematological: Negative for adenopathy. Does not bruise/bleed easily. Psychiatric/Behavioral: Positive for dysphoric mood. Negative for agitation, confusion, decreased concentration and hallucinations. The patient is not nervous/anxious and is not hyperactive. /82   Pulse 84   Ht 5' 7\" (1.702 m)   Wt 225 lb (102.1 kg)   SpO2 99%   BMI 35.24 kg/m²   Physical Exam  Vitals signs and nursing note reviewed. Constitutional:       General: She is not in acute distress. Appearance: Normal appearance. She is well-developed. She is obese. She is not ill-appearing, toxic-appearing or diaphoretic. HENT:      Head: Normocephalic and atraumatic. Right Ear: Tympanic membrane, ear canal and external ear normal. There is no impacted cerumen. Left Ear: Tympanic membrane, ear canal and external ear normal.      Nose: Nose normal. No congestion or rhinorrhea. Mouth/Throat:      Mouth: Mucous membranes are moist.      Pharynx: Oropharynx is clear. No oropharyngeal exudate or posterior oropharyngeal erythema. Eyes:      General: No scleral icterus. Right eye: No discharge. Left eye: No discharge. Extraocular Movements: Extraocular movements intact. Conjunctiva/sclera: Conjunctivae normal.      Pupils: Pupils are equal, round, and reactive to light.    Neck: Musculoskeletal: Normal range of motion. No neck rigidity or muscular tenderness. Thyroid: No thyromegaly. Vascular: No carotid bruit or JVD. Trachea: No tracheal deviation. Cardiovascular:      Rate and Rhythm: Normal rate and regular rhythm. Pulses: Normal pulses. Heart sounds: Normal heart sounds. No murmur. No friction rub. No gallop. Pulmonary:      Effort: Pulmonary effort is normal. No respiratory distress. Breath sounds: Normal breath sounds. No stridor. No wheezing, rhonchi or rales. Chest:      Chest wall: No tenderness. Abdominal:      General: Abdomen is flat. Bowel sounds are normal. There is no distension. Palpations: Abdomen is soft. There is no mass. Tenderness: There is no abdominal tenderness. There is no right CVA tenderness, left CVA tenderness, guarding or rebound. Hernia: No hernia is present. Musculoskeletal: Normal range of motion. General: No swelling, tenderness, deformity or signs of injury. Lumbar back: She exhibits pain and spasm. She exhibits normal range of motion, no bony tenderness and no swelling. Back:       Right lower leg: No edema. Left lower leg: No edema. Comments: Swelling to the lower extremities. Does have some hip pain on palpation of the left hip    Visual inspection:  Deformity/amputation: absent  Skin lesions/pre-ulcerative calluses: present to bottom of the left foot  Edema: right- negative, left- negative    Sensory exam:  Monofilament sensation: normal  (minimum of 5 random plantar locations tested, avoiding callused areas - > 1 area with absence of sensation is + for neuropathy)    Plus at least one of the following:  Pulses: normal,   Pinprick: Intact  Proprioception: N/A  Vibration (128 Hz): N/A   Lymphadenopathy:      Cervical: No cervical adenopathy. Skin:     General: Skin is warm and dry. Capillary Refill: Capillary refill takes less than 2 seconds.       Coloration: Environmental allergies/asthma/COVID-19: Symptoms are improving for COVID-19. She still has difficulty with her allergies and her mild intermittent asthma. Continue medications as prescribed    7. Acid reflux: Some difficulty with acid reflux at times. However, if she takes her Protonix, her symptoms are improved. Take her Protonix as prescribed. Small frequent meals. Avoid fatty, fried greasy or spicy foods. 8.  Hypothyroidism: Continue levothyroxine at current dose    9. Hypertension: Blood pressure stable    10. History of DVT/coate's syndrome: Continue medications as prescribed. Marlene Bryson was seen today for hypertension and diabetes. Diagnoses and all orders for this visit:    Type 2 diabetes mellitus without complication, unspecified whether long term insulin use (HCC)  -      DIABETES FOOT EXAM  -     CBC Auto Differential; Future  -     Comprehensive Metabolic Panel; Future  -     Hemoglobin A1C; Future  -     Lipid Panel; Future  -     TSH without Reflex; Future  -     Microalbumin / Creatinine Urine Ratio; Future    B12 deficiency  -     Cancel: RI VITAMIN B12 INJECTION  -     Vitamin D 25 Hydroxy; Future  -     Vitamin B12; Future  -     cyanocobalamin injection 1,000 mcg    Vitamin D deficiency   -     Vitamin D 25 Hydroxy; Future    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      Orders Placed This

## 2020-11-21 ENCOUNTER — PATIENT MESSAGE (OUTPATIENT)
Dept: INTERNAL MEDICINE | Age: 70
End: 2020-11-21

## 2020-11-21 ASSESSMENT — ENCOUNTER SYMPTOMS
EYE ITCHING: 0
SINUS PRESSURE: 0
WHEEZING: 0
EYE DISCHARGE: 0
APNEA: 0
VOMITING: 0
VOICE CHANGE: 0
NAUSEA: 0
STRIDOR: 0
CONSTIPATION: 0
CHEST TIGHTNESS: 0
COUGH: 0
DIARRHEA: 0
ABDOMINAL PAIN: 0
BACK PAIN: 1
RHINORRHEA: 0
COLOR CHANGE: 0
SHORTNESS OF BREATH: 1
SORE THROAT: 0
SINUS PAIN: 0
TROUBLE SWALLOWING: 0
ABDOMINAL DISTENTION: 0
BLOOD IN STOOL: 0

## 2020-12-03 ENCOUNTER — CARE COORDINATION (OUTPATIENT)
Dept: CARE COORDINATION | Age: 70
End: 2020-12-03

## 2020-12-03 NOTE — CARE COORDINATION
[facility] Transitions Follow Up Call    12/3/2020    Patient: Kristen Gillette  Patient : 1950   MRN: <[id number]>  Reason for Admission:   Discharge Date: 20 RARS: Readmission Risk Score: 34         Spoke with: Patient    Patient states she is doing well. No wheezing,  chest pain, fatigue, fever, appetite good. Patient states she coughs at night and gets SOB with some exertion. Uses inhaler with relief. Patient has all medications is taking medications as directed and has no questions concerning medications at this time. Patient / family is aware of when to contact MD with any new or worsening symptoms. Will continue to follow. Jackson Currie LPN    [phone]  [facility] / [address] Transitions Subsequent and Final Call    Subsequent and Final Calls  Do you have any ongoing symptoms?:  Yes  Patient-reported symptoms:  Cough, Shortness of Breath  Have your medications changed?:  No  Do you have any questions related to your medications?:  No  Do you currently have any active services?:  No  Do you have any needs or concerns that I can assist you with?:  No  Identified Barriers:  None  Care Transitions Interventions  Other Interventions:             Follow Up  Future Appointments   Date Time Provider Jose Shea   2021 10:10 AM SCHEDULE, L MED ONC MA L MED ONC Shannan BOOTH   2021 10:30 AM CHANELLE Costa [facility]   3/18/2021  9:15 AM [facility] 

## 2020-12-14 RX ORDER — ESCITALOPRAM OXALATE 20 MG/1
20 TABLET ORAL DAILY
Qty: 90 TABLET | Refills: 3 | Status: SHIPPED | OUTPATIENT
Start: 2020-12-14 | End: 2021-07-07 | Stop reason: SDUPTHER

## 2020-12-14 RX ORDER — GABAPENTIN 100 MG/1
CAPSULE ORAL
Qty: 270 CAPSULE | Refills: 0 | Status: SHIPPED | OUTPATIENT
Start: 2020-12-14 | End: 2021-03-18

## 2020-12-14 RX ORDER — FEXOFENADINE HCL 180 MG/1
180 TABLET ORAL DAILY
Qty: 90 TABLET | Refills: 3 | Status: SHIPPED | OUTPATIENT
Start: 2020-12-14 | End: 2022-05-20

## 2020-12-14 RX ORDER — ESCITALOPRAM OXALATE 20 MG/1
TABLET ORAL
Qty: 30 TABLET | Refills: 0 | OUTPATIENT
Start: 2020-12-14

## 2020-12-14 NOTE — TELEPHONE ENCOUNTER
Veronica said Dr. Jackson is prescribing Lexapro.  [facility] said they haven't received a script from Dr. Jackson, the only script has been from [facility].  Message left for Veronica requesting a return call.

## 2020-12-14 NOTE — TELEPHONE ENCOUNTER
Sylwia Parker called to request a refill on her medication. Last office visit : 11/20/2020   Next office visit : 12/14/2020     Last UDS: No results found for: Katerina Rangel, LABBENZ, BUPRENUR, COCAIMETSCRU, GABAPENTIN, MDMA, METAMPU, OPIATESCREENURINE, OXTCOSU, PHENCYCLIDINESCREENURINE, PROPOXYPHENE, THCSCREENUR, TRICYUR    Last Jose: 12/14/20  Medication Contract: 6/7/19    Requested Prescriptions     Pending Prescriptions Disp Refills    gabapentin (NEURONTIN) 100 MG capsule [Pharmacy Med Name: Gabapentin 100 MG Oral Capsule] 270 capsule 0     Sig: TAKE 1 CAPSULE BY MOUTH THREE TIMES DAILY         Please approve or refuse this medication.    Konrda Welch

## 2020-12-17 ENCOUNTER — TELEPHONE (OUTPATIENT)
Dept: INTERNAL MEDICINE | Age: 70
End: 2020-12-17

## 2020-12-17 ENCOUNTER — CARE COORDINATION (OUTPATIENT)
Dept: CASE MANAGEMENT | Age: 70
End: 2020-12-17

## 2020-12-17 NOTE — CARE COORDINATION
[address] for Care Improvement ([facility]) Follow Up Call  Qualifying Diagnosis of Pneumonia. 12/17/2020  Patient Name:  Meredith Tai   YOB: 1950  Discharge Date:  9/30/20  RARS:  Readmission Risk Score: 29    PCP:  Ishan Toribio MD    Assessment:      Short of Breath: yes, frequently   Cough Frequency, Productive/Color: non-productive cough every night   Appetite: bites in the evening, is still working at a [employer]  Sleep pattern: broken sleep, has been waiting on a good fitting CPAP mask since mid October. Called [employer] to inquire about face mask not fitting, requires a smaller one.  Thinking clearly: yes   Tired/weakness: yes   Fever, Chills Sweating: \"I'm always cold\".  Headaches: occasionally  [address],  Active: no   Medication Needs/Questions: denies   Transportation Need:  denies   Ability to Prepare Meals, Bathe: yes   Do you feel like you have everything you need to stay well at home? Last PT, INR was in October. Call placed to PCP office, spoke to Alfonso Galeana. Explained the above needs:  INR for Coumadin, awaiting good fitting CPAP mask, SOB, dry cough, broken sleep. Alfonso Galeana states that Valorie Juan needs to contact the CPAP provider. She gave me the number, call placed. The DME supplier does not assist the patient to ensure a good fit, the supplies are shipped. They stated that they sent the smallest one. Call placed to Valorie Juan to tell her that she will need to try to fit it by using the starps provided and tighten them. If that does not work, to bring the equipment to her PCP's office for assistance. 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   Called Valorie Juan back to inform her and said that it is very important she get this level checked. If she does not receive a call from the office within a couple of days to call them. Leny V/U  Instructed Francisco Sharp that if broken sleep and nightly coughs continue, she will need to be seen by her physician. V/U   Follow Up Appointment: completed.  Agreeable to ongoing Care Transition Communications:  Care Transitions will continue to follow per [facility].  Ronda Jay RN, CTN  Follow Up Concerns: CPAP Mask does not fit, frequent dry cough, dyspnea, poor appetite, broken sleep, needs INR draw. Francisco Sharp was at work at time of call.     Future Appointments   Date Time Provider Jose Shea   2/25/2021 10:10 AM SCHEDULE, [facility] MED ONC MA [facility] MED ONC Shannan BOOTH   2/25/2021 10:30 AM CHANELLE [place]aley [place]   3/18/2021  9:15 AM MD HIPOLITO Rodriguez [facility]

## 2020-12-17 NOTE — TELEPHONE ENCOUNTER
Pt called and stated he has not had a coumadin clinic appointment in a month. Please contact pt for an appointment.

## 2020-12-21 ENCOUNTER — CARE COORDINATION (OUTPATIENT)
Dept: CASE MANAGEMENT | Age: 70
End: 2020-12-21

## 2020-12-21 ENCOUNTER — TELEPHONE (OUTPATIENT)
Dept: PRIMARY CARE CLINIC | Age: 70
End: 2020-12-21

## 2020-12-21 NOTE — CARE COORDINATION
[facility] Trans[facility] Follow Up Call    2020    Patient: Amy Guillen  Patient : 1950   MRN: [id number]  Reason for Admission:   Discharge Date: 20 RARS: Readmission Risk Score: 29         Spoke with: Pino Morejon, patient    Care Transitions Subsequent and Final Call    Subsequent and Final Calls  Do you have any ongoing symptoms?: Yes  Have your medications changed?: No  Do you have any questions related to your medications?: No  Do you currently have any active services?: No  Do you have any needs or concerns that I can assist you with?: No  Identified Barriers: None  Care Transitions Interventions  Other Interventions: Follow Up:  Contacted patient for BPCI-A follow up. Spoke briefly with patient who stated she was at work. She stated she is doing better. Continues to have a dry, non productive cough only at night. She reports the CPAP mask still does not fit properly. She stated she adjusted the straps but still does not fit. Recommended that she take equipment into PCP office for assistance and advised by previous CTN. Patient verbalized understanding. She also stated that she has not heard from anyone from the Coumadin Clinic in regards to an appointment. Vandana Nice stated she plans to contact office today. Discussed importance of checking INR. She verbalized understanding. Discussed when to contact provider with any new or worsening symptoms. She verbalized understanding. No questions or concerns at this time. Will continue to follow. Follow up concerns:  Please verify with patient that she has an INR appointment.       Future Appointments   Date Time Provider Jose Shea   2021 10:10 AM SCHEDULE, L MED ONC MA L MED ONC Shannan BOOTH   2021 10[facility]   3/18/2021  9:15 AM MD HIPOLITO Escobedo [facility]ED[facility] Melo RN

## 2020-12-22 ENCOUNTER — NURSE ONLY (OUTPATIENT)
Dept: INTERNAL MEDICINE | Age: 70
End: 2020-12-22
Payer: MEDICARE

## 2020-12-22 ENCOUNTER — ANTI-COAG VISIT (OUTPATIENT)
Dept: PRIMARY CARE CLINIC | Age: 70
End: 2020-12-22
Payer: MEDICARE

## 2020-12-22 LAB — INTERNATIONAL NORMALIZATION RATIO, POC: 1.2

## 2020-12-22 PROCEDURE — 96372 THER/PROPH/DIAG INJ SC/IM: CPT | Performed by: INTERNAL MEDICINE

## 2020-12-22 PROCEDURE — 85610 PROTHROMBIN TIME: CPT | Performed by: INTERNAL MEDICINE

## 2020-12-22 PROCEDURE — 93793 ANTICOAG MGMT PT WARFARIN: CPT | Performed by: INTERNAL MEDICINE

## 2020-12-22 RX ORDER — CYANOCOBALAMIN 1000 UG/ML
1000 INJECTION INTRAMUSCULAR; SUBCUTANEOUS ONCE
Status: COMPLETED | OUTPATIENT
Start: 2020-12-22 | End: 2020-12-22

## 2020-12-22 RX ADMIN — CYANOCOBALAMIN 1000 MCG: 1000 INJECTION INTRAMUSCULAR; SUBCUTANEOUS at 10:00

## 2020-12-22 NOTE — PROGRESS NOTES
1000mcg of B12 given of pt's own stock. Pt tolerated this well.  Kayce Gonzalez 47 #[id number] LOT #  EXP 12/21

## 2020-12-22 NOTE — PROGRESS NOTES
Ms. Crista Michael was here today. INR today:   Results for orders placed or performed in visit on 12/22/20   POCT INR   Result Value Ref Range    INR 1.2      INR Goal: 2.0-3.0    Dosing Plan  As of 12/22/2020    TTR:  19.0 % (2.6 y)   Full warfarin instructions:  12/22: 11.25 mg; Otherwise 7.5 mg every day          Patient has been eating cabbage the past 2 nights  PLAN: INCREASE DOSE TONIGHT TO 11.25 MG [facility]. NEXT COUMADIN CLINIC APT IS: [email or web address]    Coumadin Clinic Hours  Tuesday 7:30am - 4:00pm  Wednesday 7:30am - 4:00pm  Thursday 7:30am - 4:00pm    IF IT'S AN EMERGENCY, PLEASE CALL 911 OR GO TO YOUR NEAREST EMERGENCY ROOM. [facility] [phone]  IF UNABLE TO REACH COUMADIN CLINIC, [address] [place] [address] Rd, [phone]. Electronically signed by Gianfranco Desai MD on 12/22/2020 at 8:32 AM    I have reviewed nursing plan for Coumadin management and agree with plan.    \

## 2020-12-28 RX ORDER — LISINOPRIL 40 MG/1
TABLET ORAL
Qty: 90 TABLET | Refills: 1 | Status: SHIPPED | OUTPATIENT
Start: 2020-12-28 | End: 2021-03-18 | Stop reason: SDUPTHER

## 2020-12-29 ENCOUNTER — CARE COORDINATION (OUTPATIENT)
Dept: CASE MANAGEMENT | Age: 70
End: 2020-12-29

## 2020-12-29 ENCOUNTER — TELEPHONE (OUTPATIENT)
Dept: INTERNAL MEDICINE | Age: 70
End: 2020-12-29

## 2020-12-29 NOTE — CARE COORDINATION
[facility] Transitions Follow Up Call    2020    Patient: Crista Hollidayo  Patient : 1950   MRN: [id number]  Reason for Admission:   Discharge Date: 20 RARS: Readmission Risk Score: 29         Spoke with: Sophia Ariza, patient    Care Transitions Subsequent and Final Call    Subsequent and Final Calls  Do you have any ongoing symptoms?: Yes  Patient-reported symptoms: Chest Pain, Shortness of Breath, Other  Interventions for patient-reported symptoms: Notified PCP/Physician  Have your medications changed?: No  Do you have any questions related to your medications?: No  Do you currently have any active services?: No  Do you have any needs or concerns that I can assist you with?: No  Care Transitions Interventions  Other Interventions: Follow Up:  Contacted patient for BPCI-A follow up. Milind Price stated she is still very tired. Reports having chest pain on the right side and shortness of breath. She stated she is having weakness in her right arm which is new for her. Advised she go to the ER to be evaluated. Milind Price stated symptoms will go away in about 5 minutes like it usually does. CTN again advised patient go to the ER to be evaluated. Patient refused to go at this time. CTN will contact PCP office. Will extend BPCI-A bundle to 2021. Attempted to contact PCP office. Left message on voicemail for PCP nurse. Left message stating patient having chest pain on the right side and shortness of breath. Having new symptom of right arm weakness. Patient was encouraged to go to the ER but refused. Please contact patient directly or if having any questions CTN can be reached at [phone].       Future Appointments   Date Time Provider Jose Shea   2021 10:30 AM SCHEDULE, [facility]   2021 10:10 AM SCHEDULE, L MED ONC [facility] Shannan BOOTH   2021 10:30 AM CHANELLE DaleyC [facility]   3/18/2021  9:15 AM Regina WILLS MD [facility]ON[facility]PIEDAD Weber RN

## 2020-12-30 ENCOUNTER — CARE COORDINATION (OUTPATIENT)
Dept: CASE MANAGEMENT | Age: 70
End: 2020-12-30

## 2020-12-30 ENCOUNTER — TELEPHONE (OUTPATIENT)
Dept: INTERNAL MEDICINE | Age: 70
End: 2020-12-30

## 2020-12-30 NOTE — TELEPHONE ENCOUNTER
Arley Tejada pts transition nurse called and stated pt denies any distress. Pt does have shortness of breath, (R) side pain and Chest Pain. Pt no longer has weakness in (R) arm.

## 2020-12-30 NOTE — CARE COORDINATION
[facility] Transitions Follow Up Call    2020    Patient: Angela Best  Patient : 1950   MRN: [id number]  Reason for Admission:   Discharge Date: 20 RARS: Readmission Risk Score: 29         Spoke with: Alexander Garcia, patient    Care Transitions Subsequent and Final Call    Subsequent and Final Calls  Do you have any ongoing symptoms?: Yes  Patient-reported symptoms: Shortness of Breath, Chest Pain  Interventions for patient-reported symptoms: Notified PCP/Physician  Have your medications changed?: No  Do you have any questions related to your medications?: No  Do you currently have any active services?: No  Do you have any needs or concerns that I can assist you with?: No  Identified Barriers: None  Care Transitions Interventions  Other Interventions: Follow Up:  Contacted patient for BPCI-A follow up and to follow up on symptoms from yesterday. Sergio Hanley stated she is still having \"some\" shortness of breath. Denies being in distress. She reports having occasional right sided chest pain. She is no longer having weakness in her right arm. Discussed when to contact provider with any new or worsening symptoms and when to report to the ER. She verbalized understanding. CTN attempted to contact PCP office to give update on symptoms. Left detailed message on voicemail for PCP nurse. Left CTN contact information as well as patient contact information. Attempted to call patient back to let her know that CTN left message. No answer. Will continue to follow for BPCI-A bundle.       Future Appointments   Date Time Provider Jose Shea   2021 10:30 AM SCHEDULE, [facility]   2021 10:10 AM SCHEDULE, [facility]   2021 10:30 AM CHANELLE Govea N [facility]   3/18/2021  9:15 AM MD [facility] Modi [facility]       Rl Christianson RN

## 2020-12-30 NOTE — TELEPHONE ENCOUNTER
Sorry this is after you left, call came in right as the baby for Dr. Fredy Mobley came in. Patricia with [facility] called and stated that the was having right side chest pain, SOB, right arm weakness when she talked to her. She advised the pt to go to the ED, but the pt refused. This NCMA reached out to the pt at 6:30pm, and she stated the pain has stopped, but she is still really SOB. This NCMA advised again that she goes to the ED. Pt stated her daughter told her she didn't get up and walk enough and that is why she is out of breath. Pt stated that she will not go to the ED at all. This NCMA again advised the ED and told her that at any point the pain is back and worse or if she can not breath she is to go to the ED immediately. Pt just kind of blew it off and stated she would be fine. This NCMA will not be here tomorrow, but advised the pt to call us and let us know how she is doing. If she does not call by lunch please reach out to her, she could hardly speak to me on the phone due to SOB.

## 2020-12-30 NOTE — TELEPHONE ENCOUNTER
Check on patient to see how she is doing. May need to see Manoj Andrade or Shyann Saul today.  I only work until noon today and have to be somewhere this afternoon

## 2020-12-31 ENCOUNTER — TELEPHONE (OUTPATIENT)
Dept: INTERNAL MEDICINE | Age: 70
End: 2020-12-31

## 2020-12-31 NOTE — TELEPHONE ENCOUNTER
Spoke with pt. She said she is not having any problems currently. I advised if she started having problems to let us know.

## 2020-12-31 NOTE — TELEPHONE ENCOUNTER
Please see Triage note on patient from 12/30. I was out of office yesterday.  Please call to check on patient

## 2021-01-04 ENCOUNTER — CARE COORDINATION (OUTPATIENT)
Dept: CASE MANAGEMENT | Age: 71
End: 2021-01-04

## 2021-01-04 NOTE — CARE COORDINATION
[facility] Transitions Follow Up Call    2021    Patient: Candido Connelly  Patient : 1950   MRN: [id number]  Reason for Admission:   Discharge Date: 20 RARS: Readmission Risk Score: 29    Follow Up: Attempted to contact patient for BPCI-A follow up. Unable to reach patient. No answer. Will try again at a later time.       Future Appointments   Date Time Provider Jose Shea   2021 10:30 AM SCHEDULE, [facility]   2021 10:10 AM SCHEDULE, [facility] MED ONC [facility] MED ONC Shann[facility]   2021 10:30 [facility]   3/18/2021  9:15 AM Lucía Le MD [facility]       Jane Lagos RN

## 2021-01-07 ENCOUNTER — ANTI-COAG VISIT (OUTPATIENT)
Dept: INTERNAL MEDICINE | Age: 71
End: 2021-01-07
Payer: MEDICARE

## 2021-01-07 DIAGNOSIS — Z79.01 ANTICOAGULATED ON COUMADIN: ICD-10-CM

## 2021-01-07 LAB — INR BLD: 1.4

## 2021-01-07 PROCEDURE — 93793 ANTICOAG MGMT PT WARFARIN: CPT | Performed by: INTERNAL MEDICINE

## 2021-01-12 ENCOUNTER — CARE COORDINATION (OUTPATIENT)
Dept: CASE MANAGEMENT | Age: 71
End: 2021-01-12

## 2021-01-15 LAB — INR BLD: 3.3

## 2021-01-18 ENCOUNTER — ANTI-COAG VISIT (OUTPATIENT)
Dept: INTERNAL MEDICINE | Age: 71
End: 2021-01-18
Payer: MEDICARE

## 2021-01-18 DIAGNOSIS — Z79.01 ANTICOAGULATED ON COUMADIN: ICD-10-CM

## 2021-01-18 PROCEDURE — 93793 ANTICOAG MGMT PT WARFARIN: CPT | Performed by: INTERNAL MEDICINE

## 2021-01-20 NOTE — PROGRESS NOTES
Results were scanned into this encounter. This was one of multiple INR's that the PSR's did not distribute to the nursing staff and put in my folder instead. I have asked that any out of range INR's be hand delivered to nursing staff on Fridays but that has not happened. I will create another encounter when she calls in today's reading. My concern is that she may not be a good candidate for home testing. I do ask everyone to test M-Th but some of the elderly patient's have to wait until an adult child can come do it on a weekend.   Mauricio MCDUFFIE

## 2021-01-20 NOTE — PROGRESS NOTES
We did not receive anything on this pt on 01/15/2021 that I know of. She was reach and spoke to Radha Tavarez on 01/07/2021, so I am not sure why she said since she did not here from anyone then. Documentation is in [facility] If the INR came in over the weekend or late on Friday we would not have seen it and that is why there is nothing scanned in the chart, because if that happens it goes into Regina's pile for her to get on Monday morning. We need to figure out a way that INRs get done m-th only because we are getting a lot over the weekends for some reason and pts are not getting what they need. We have came in multiple times on Monday and have an out of range INR left on the  on a Sunday.

## 2021-01-20 NOTE — PROGRESS NOTES
I called patient again and reached her by phone on 1/20/21. Patient had not returned my calls regarding her elevated INR from 1/15/21. This was not addressed last Friday. Sarah Dukes states that since she did not hear anything on 1/7/21 she would increase her dose on her own to 15 mg a day. I asked that she please take it again this afternoon and call it in to me. It concerns me that an out of range INR was not addressed on a Fridat when the Coumadin Clinic is closed and also that the patient thinks she can double her dose herself. She is new to home testing and we will need to make it clear to her that he instructions will come from her provider/coumadin clinic.   Dorothy Yoder CCM

## 2021-01-21 ENCOUNTER — TELEPHONE (OUTPATIENT)
Dept: PRIMARY CARE CLINIC | Age: 71
End: 2021-01-21

## 2021-01-21 ENCOUNTER — TELEPHONE (OUTPATIENT)
Dept: INTERNAL MEDICINE | Age: 71
End: 2021-01-21

## 2021-01-21 ENCOUNTER — ANTI-COAG VISIT (OUTPATIENT)
Dept: INTERNAL MEDICINE | Age: 71
End: 2021-01-21
Payer: MEDICARE

## 2021-01-21 DIAGNOSIS — Z79.01 ANTICOAGULATED ON COUMADIN: ICD-10-CM

## 2021-01-21 LAB — INR BLD: 2.4

## 2021-01-21 PROCEDURE — 93793 ANTICOAG MGMT PT WARFARIN: CPT | Performed by: INTERNAL MEDICINE

## 2021-01-21 NOTE — PROGRESS NOTES
HOME MONITORING REPORT    INR today:   Results for orders placed or performed in visit on 01/21/21   Protime-INR   Result Value Ref Range    INR 2.40        INR Goal: 2.0-3.0    Dosing Plan  As of 1/21/2021    TTR:  19.3 % (2.7 y)   Full warfarin instructions:  15 mg every day              PLAN: Advised patient/caregiver to continue current dose and recheck on 1/26/21. Patient/Caregiver voiced understanding      Electronically signed by Lorenzo Rivera MD on 1/21/2021 at 3:29 PM    I have reviewed nursing plan for Coumadin management and agree with plan.

## 2021-01-21 NOTE — TELEPHONE ENCOUNTER
Patient called with her results, anticoagulation encounter completed and sent to provider.  Anna MCDUFFIE

## 2021-01-27 RX ORDER — PANTOPRAZOLE SODIUM 40 MG/1
TABLET, DELAYED RELEASE ORAL
Qty: 30 TABLET | Refills: 0 | Status: SHIPPED | OUTPATIENT
Start: 2021-01-27 | End: 2021-03-18

## 2021-01-28 ENCOUNTER — ANTI-COAG VISIT (OUTPATIENT)
Dept: INTERNAL MEDICINE | Age: 71
End: 2021-01-28
Payer: MEDICARE

## 2021-01-28 DIAGNOSIS — Z79.01 ANTICOAGULATED ON COUMADIN: ICD-10-CM

## 2021-01-28 LAB
INR BLD: 2.2
INR BLD: 2.2

## 2021-01-28 PROCEDURE — 93793 ANTICOAG MGMT PT WARFARIN: CPT | Performed by: INTERNAL MEDICINE

## 2021-01-28 NOTE — PROGRESS NOTES
HOME MONITORING REPORT    INR today: No results found for this visit on 01/28/21. INR Goal: 2.0-3.0    Dosing Plan  As of 1/28/2021    TTR:  19.3 % (2.7 y)              PLAN: Advised patient/caregiver to continue current dose and recheck in one week. Patient/Caregiver voiced understanding      Electronically signed by Maribel Deng MD on 1/28/2021 at 4:33 PM      I have reviewed nursing plan for Coumadin management and agree with plan.

## 2021-01-29 ENCOUNTER — ANTI-COAG VISIT (OUTPATIENT)
Dept: INTERNAL MEDICINE | Age: 71
End: 2021-01-29
Payer: MEDICARE

## 2021-01-29 DIAGNOSIS — Z79.01 ANTICOAGULATED ON COUMADIN: ICD-10-CM

## 2021-01-29 PROCEDURE — 93793 ANTICOAG MGMT PT WARFARIN: CPT | Performed by: INTERNAL MEDICINE

## 2021-01-29 NOTE — PROGRESS NOTES
Pt was notified to continue current dose of coumadin and recheck in 1 week. Electronically signed by Keagan Giordano MD on 1/29/2021 at 3:37 PM     I have reviewed nursing plan for Coumadin management and agree with plan.

## 2021-02-04 ENCOUNTER — ANTI-COAG VISIT (OUTPATIENT)
Dept: INTERNAL MEDICINE | Age: 71
End: 2021-02-04
Payer: MEDICARE

## 2021-02-04 DIAGNOSIS — Z79.01 ANTICOAGULATED ON COUMADIN: ICD-10-CM

## 2021-02-04 LAB — INR BLD: 2.3

## 2021-02-04 PROCEDURE — 93793 ANTICOAG MGMT PT WARFARIN: CPT | Performed by: INTERNAL MEDICINE

## 2021-02-04 NOTE — PROGRESS NOTES
HOME MONITORING REPORT    INR today:   Results for orders placed or performed in visit on 02/04/21   Protime-INR   Result Value Ref Range    INR 2.30        INR Goal: 2.0-3.0    Dosing Plan  As of 2/4/2021    TTR:  20.4 % (2.7 y)   Full warfarin instructions:  15 mg every day              PLAN: Advised patient/caregiver to continue current dose and recheck in one week. Patient/Caregiver voiced understanding      Electronically signed by Sami Yao MD on 2/4/2021 at 2:05 PM    I have reviewed nursing plan for Coumadin management and agree with plan.

## 2021-02-12 ENCOUNTER — ANTI-COAG VISIT (OUTPATIENT)
Dept: INTERNAL MEDICINE | Age: 71
End: 2021-02-12

## 2021-02-12 DIAGNOSIS — Z79.01 ANTICOAGULATED ON COUMADIN: ICD-10-CM

## 2021-02-12 LAB — INR BLD: 5.6

## 2021-02-12 NOTE — PROGRESS NOTES
HOME MONITORING REPORT    INR today:   Results for orders placed or performed in visit on 02/12/21   Protime-INR   Result Value Ref Range    INR 5.60        INR Goal: 2.0-3.0    Dosing Plan  As of 2/12/2021    TTR:  20.4 % (2.8 y)   Full warfarin instructions:  2/12: Hold; 2/13: Hold; 2/14: Hold; Otherwise 15 mg every day              PLAN: Advised patient/caregiver to hold until Monday and recheck then. Patient/Caregiver voiced understanding    Electronically signed by Sami Yao MD on 2/15/2021 at 8:16 AM    I have reviewed nursing plan for Coumadin management and agree with plan.

## 2021-02-15 ENCOUNTER — ANTI-COAG VISIT (OUTPATIENT)
Dept: INTERNAL MEDICINE | Age: 71
End: 2021-02-15
Payer: MEDICARE

## 2021-02-15 DIAGNOSIS — Z79.01 ANTICOAGULATED ON COUMADIN: ICD-10-CM

## 2021-02-15 LAB — INR BLD: 3.2

## 2021-02-15 PROCEDURE — 93793 ANTICOAG MGMT PT WARFARIN: CPT | Performed by: INTERNAL MEDICINE

## 2021-02-15 NOTE — PROGRESS NOTES
Hold tonight and resume current dose tomorrow. Check in a week    Electronically signed by Latha Barker MD on 2/15/2021 at 12:39 PM    I have reviewed nursing plan for Coumadin management and agree with plan.

## 2021-02-15 NOTE — PROGRESS NOTES
Please advise, Pt held her coumadin Friday, Saturday, and Sunday. Her INR today is 3.2. Pt was notified to hold her coumadin tonight and resume current dose tomorrow.  Check in a week

## 2021-02-24 ENCOUNTER — ANTI-COAG VISIT (OUTPATIENT)
Dept: INTERNAL MEDICINE | Age: 71
End: 2021-02-24
Payer: MEDICARE

## 2021-02-24 DIAGNOSIS — Z79.01 ANTICOAGULATED ON COUMADIN: ICD-10-CM

## 2021-02-24 LAB — INR BLD: 3.1

## 2021-02-24 PROCEDURE — 93793 ANTICOAG MGMT PT WARFARIN: CPT | Performed by: INTERNAL MEDICINE

## 2021-02-24 NOTE — PROGRESS NOTES
HOME MONITORING REPORT    INR today:   Results for orders placed or performed in visit on 02/24/21   Protime-INR   Result Value Ref Range    INR 3.10        INR Goal: 2.0-3.0    Dosing Plan  As of 2/24/2021    TTR:  20.2 % (2.8 y)   Full warfarin instructions:  2/24: 7.5 mg; Otherwise 15 mg every day          PATENT CALLED IN RESULT 2/24    PLAN: Advised patient/caregiver to take a half dose of 7.5 mg tonight only, thencontinue current dose and recheck in one week. Patient/Caregiver voiced understanding    Electronically signed by Meagan Oakley MD on 2/25/2021 at 5:19 AM    I have reviewed nursing plan for Coumadin management and agree with plan.

## 2021-02-25 ENCOUNTER — OFFICE VISIT (OUTPATIENT)
Dept: HEMATOLOGY | Age: 71
End: 2021-02-25
Payer: MEDICARE

## 2021-02-25 ENCOUNTER — NURSE ONLY (OUTPATIENT)
Dept: INTERNAL MEDICINE | Age: 71
End: 2021-02-25
Payer: MEDICARE

## 2021-02-25 ENCOUNTER — HOSPITAL ENCOUNTER (OUTPATIENT)
Dept: INFUSION THERAPY | Age: 71
Discharge: HOME | End: 2021-02-25
Payer: MEDICARE

## 2021-02-25 VITALS
HEART RATE: 82 BPM | OXYGEN SATURATION: 96 % | TEMPERATURE: 97.5 F | WEIGHT: 236.1 LBS | HEIGHT: 67 IN | BODY MASS INDEX: 37.06 KG/M2

## 2021-02-25 DIAGNOSIS — D50.9 IRON DEFICIENCY ANEMIA, UNSPECIFIED IRON DEFICIENCY ANEMIA TYPE: ICD-10-CM

## 2021-02-25 DIAGNOSIS — R07.9 CHEST PAIN, UNSPECIFIED TYPE: Primary | ICD-10-CM

## 2021-02-25 DIAGNOSIS — E03.9 HYPOTHYROIDISM, UNSPECIFIED TYPE: ICD-10-CM

## 2021-02-25 DIAGNOSIS — D69.6 THROMBOCYTOPENIA (HCC): ICD-10-CM

## 2021-02-25 DIAGNOSIS — E53.8 B12 DEFICIENCY: Primary | ICD-10-CM

## 2021-02-25 DIAGNOSIS — Z79.01 ANTICOAGULATED ON COUMADIN: ICD-10-CM

## 2021-02-25 LAB
ALBUMIN SERPL-MCNC: 4.1 G/DL (ref 3.5–5.2)
ALP BLD-CCNC: 67 U/L (ref 35–104)
ALT SERPL-CCNC: 13 U/L (ref 9–52)
ANION GAP SERPL CALCULATED.3IONS-SCNC: 7 MMOL/L (ref 7–19)
AST SERPL-CCNC: 27 U/L (ref 14–36)
BASOPHILS ABSOLUTE: 0.03 K/UL (ref 0.01–0.08)
BASOPHILS RELATIVE PERCENT: 0.6 % (ref 0.1–1.2)
BILIRUB SERPL-MCNC: 0.3 MG/DL (ref 0.2–1.3)
BUN BLDV-MCNC: 29 MG/DL (ref 7–17)
CALCIUM SERPL-MCNC: 8.8 MG/DL (ref 8.4–10.2)
CHLORIDE BLD-SCNC: 109 MMOL/L (ref 98–111)
CO2: 27 MMOL/L (ref 22–29)
CREAT SERPL-MCNC: 1.3 MG/DL (ref 0.5–1)
EOSINOPHILS ABSOLUTE: 0.11 K/UL (ref 0.04–0.54)
EOSINOPHILS RELATIVE PERCENT: 2.2 % (ref 0.7–7)
FERRITIN: 81.7 NG/ML (ref 11.1–264)
FOLATE: 9.7 NG/ML (ref 2.7–20)
GFR NON-AFRICAN AMERICAN: 40
GLOBULIN: 3.5 G/DL
GLUCOSE BLD-MCNC: 78 MG/DL (ref 74–106)
HCT VFR BLD CALC: 34.6 % (ref 34.1–44.9)
HEMOGLOBIN: 11.2 G/DL (ref 11.2–15.7)
IRON SATURATION: 17 % (ref 14–50)
IRON: 53 UG/DL (ref 37–170)
LYMPHOCYTES ABSOLUTE: 1.7 K/UL (ref 1.18–3.74)
LYMPHOCYTES RELATIVE PERCENT: 33.4 % (ref 19.3–53.1)
MCH RBC QN AUTO: 30.7 PG (ref 25.6–32.2)
MCHC RBC AUTO-ENTMCNC: 32.4 G/DL (ref 32.3–35.5)
MCV RBC AUTO: 94.8 FL (ref 79.4–94.8)
MONOCYTES ABSOLUTE: 0.66 K/UL (ref 0.24–0.82)
MONOCYTES RELATIVE PERCENT: 13 % (ref 4.7–12.5)
NEUTROPHILS ABSOLUTE: 2.59 K/UL (ref 1.56–6.13)
NEUTROPHILS RELATIVE PERCENT: 50.8 % (ref 34–71.1)
PDW BLD-RTO: 15.6 % (ref 11.7–14.4)
PLATELET # BLD: 123 K/UL (ref 182–369)
PMV BLD AUTO: 11.4 FL (ref 7.4–10.4)
POTASSIUM SERPL-SCNC: 4.8 MMOL/L (ref 3.5–5.1)
RBC # BLD: 3.65 M/UL (ref 3.93–5.22)
SODIUM BLD-SCNC: 143 MMOL/L (ref 137–145)
TOTAL IRON BINDING CAPACITY: 314 UG/DL (ref 265–497)
TOTAL PROTEIN: 7.6 G/DL (ref 6.3–8.2)
VITAMIN B-12: 506 PG/ML (ref 239–931)
WBC # BLD: 5.09 K/UL (ref 3.98–10.04)

## 2021-02-25 PROCEDURE — 82607 VITAMIN B-12: CPT

## 2021-02-25 PROCEDURE — 83550 IRON BINDING TEST: CPT

## 2021-02-25 PROCEDURE — 99212 OFFICE O/P EST SF 10 MIN: CPT

## 2021-02-25 PROCEDURE — 99213 OFFICE O/P EST LOW 20 MIN: CPT | Performed by: NURSE PRACTITIONER

## 2021-02-25 PROCEDURE — 93294 REM INTERROG EVL PM/LDLS PM: CPT | Performed by: INTERNAL MEDICINE

## 2021-02-25 PROCEDURE — 83540 ASSAY OF IRON: CPT

## 2021-02-25 PROCEDURE — 80053 COMPREHEN METABOLIC PANEL: CPT

## 2021-02-25 PROCEDURE — 4040F PNEUMOC VAC/ADMIN/RCVD: CPT | Performed by: NURSE PRACTITIONER

## 2021-02-25 PROCEDURE — 82728 ASSAY OF FERRITIN: CPT

## 2021-02-25 PROCEDURE — 1123F ACP DISCUSS/DSCN MKR DOCD: CPT | Performed by: NURSE PRACTITIONER

## 2021-02-25 PROCEDURE — 1090F PRES/ABSN URINE INCON ASSESS: CPT | Performed by: NURSE PRACTITIONER

## 2021-02-25 PROCEDURE — G8427 DOCREV CUR MEDS BY ELIG CLIN: HCPCS | Performed by: NURSE PRACTITIONER

## 2021-02-25 PROCEDURE — G8399 PT W/DXA RESULTS DOCUMENT: HCPCS | Performed by: NURSE PRACTITIONER

## 2021-02-25 PROCEDURE — 93296 REM INTERROG EVL PM/IDS: CPT | Performed by: INTERNAL MEDICINE

## 2021-02-25 PROCEDURE — 85025 COMPLETE CBC W/AUTO DIFF WBC: CPT

## 2021-02-25 PROCEDURE — 3017F COLORECTAL CA SCREEN DOC REV: CPT | Performed by: NURSE PRACTITIONER

## 2021-02-25 PROCEDURE — 1036F TOBACCO NON-USER: CPT | Performed by: NURSE PRACTITIONER

## 2021-02-25 PROCEDURE — 96372 THER/PROPH/DIAG INJ SC/IM: CPT | Performed by: INTERNAL MEDICINE

## 2021-02-25 PROCEDURE — G8417 CALC BMI ABV UP PARAM F/U: HCPCS | Performed by: NURSE PRACTITIONER

## 2021-02-25 PROCEDURE — G8484 FLU IMMUNIZE NO ADMIN: HCPCS | Performed by: NURSE PRACTITIONER

## 2021-02-25 PROCEDURE — 82746 ASSAY OF FOLIC ACID SERUM: CPT

## 2021-02-25 RX ORDER — CYANOCOBALAMIN 1000 UG/ML
1000 INJECTION INTRAMUSCULAR; SUBCUTANEOUS ONCE
Status: COMPLETED | OUTPATIENT
Start: 2021-02-25 | End: 2021-02-25

## 2021-02-25 RX ADMIN — CYANOCOBALAMIN 1000 MCG: 1000 INJECTION INTRAMUSCULAR; SUBCUTANEOUS at 11:55

## 2021-02-25 ASSESSMENT — ENCOUNTER SYMPTOMS
DIARRHEA: 0
VOMITING: 0
ABDOMINAL PAIN: 0
EYE DISCHARGE: 0
SORE THROAT: 0
BLOOD IN STOOL: 0
NAUSEA: 0
WHEEZING: 0
BACK PAIN: 0
GASTROINTESTINAL NEGATIVE: 1
CONSTIPATION: 0
EYE REDNESS: 0
COUGH: 0
EYE PAIN: 0

## 2021-02-25 NOTE — PROGRESS NOTES
Progress Note      Pt Name: Shilpa Lemus: 1950  MRN: [id number]    Date of evaluation: 2/25/2021  History Obtained From:  patient, electronic medical record    CHIEF COMPLAINT:    Chief Complaint   Patient presents with    Follow-up     Iron deficiency anemia, unspecified iron deficiency anemia type    Other     Chronic thrombocytopenia     HISTORY OF PRESENT ILLNESS:    Judy Romero is a 69 y.o.  female with significant PMH recurrent DVT, thrombocytopenia and iron deficiency anemia. Alexx Wolff continues on long-term anticoagulation with Coumadin and receives B12 injections under the management of Dr. Violette Patton. Recommendation has been to take ferrous gluconate 240 mg p.o. twice daily. Alexx Wolff returns today in scheduled follow-up for evaluation, lab monitoring, side effect monitoring and further treatment recommendations. She presents today reporting that she continues to take the iron supplement twice daily although is having some loose stool, denies any other GI complaints. Alexx Wolff continues to have significant fatigue, shortness of air with exertion and occasional right arm and shoulder discomfort. She indicates that she feels she is still not completely recovered from having COVID-19. Physical exam today revealed some fine crackles in the right upper lobe, will obtain a chest x-ray further evaluation. Denies any febrile illness. CBC today reveals an improvement hemoglobin of 11.2 with an MCV 94.8. Platelet count of 207,583, is stable, waxes and wanes. 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 Violette Patton?    Venofer for a total of 1000 mg completed on 7/29/2018   Ferrous sulfate 325 mg PO twice a day , 8/15/2018-May 2020  Ferrous gluconate 240 mg p.o. 3 times daily initiated in May 2020    Hematology history #1- Recurrent DVT and history of pulmonary emboli  Odilon Andrews was seen in initial consultation on 7/26/2018 during her hospitalization at [facility] for recurrent DVT and anemia. She has a history of pulmonary emboli and DVTand has been followed in the past by Dr. Dayana Montiel. Serology studies in September 2007 documented ANÍBAL negative and factor II analysis prothrombin gene mutation was negative. She has an IVC filter in place. Odilon Andrews presented to the ER at [facility] on 7/24/2018 with complaints of significant left lower extremity edema and pain that had been persistent for approximately one week prior to presentation. Odilon Andrews has been on chronic anticoagulation with warfarin since 2003 and was subtherapeutic at presentation with an INR of 1.27 on 7/24/2018. Review of INRs available through Epic dating back to 1/31/2018 to 7/24/2018 indicated Odilon Andrews has remained routinely subtherapeutic. She reported financial restraint and was unable to obtain warfarin. During her hospitalization, she was placed on a heparin drip and bridged with warfarin. LOWER EXTREMITY BILATERAL VENOUS DUPLEX On 7/25/2018 - chronic deep vein thrombosis in the right lower extremity involving the femoral and popliteal, veins. Lupus anticoagulant not detected on 7/26/2018. Recurrent DVT to Left lower extremity occurred most likely due to subtherapeutic INR related to noncompliance, do not suspect warfarin failure. Odilon Andrews indicates that she monitors her PT/INR at home and calls Dr. Luda Honeycutt office with the results for dosing changes related to her warfarin. Hematology history #2- Iron deficiency anemia  Odilon Andrews has a history of iron deficiency anemia dating back to  1993. Her deficiency may also be exacerbated by absorption, she had aRouxen-Y gastric bypass surgery 9/12/2003. She has received IV iron replacement with Venofer on several occasions under the direction of Dr. Cherri Morales.      Odilon Andrews had a bone marrow aspiration and biopsy was completed on claudication ([address]     Intestinal obstruction (HCC)     Iron deficiency     Left-sided weakness     Low vitamin D level     Lupus (HCC)     Menopause     Obesity     Osteoarthritis     Osteoporosis     Other iron deficiency anemias     Palliative care patient 09/24/2020    Pernicious anemia     PUPP (pruritic urticarial papules and plaques of pregnancy)     Right leg numbness     Right sided sciatica     Sarcoidosis     with liver involvement    Sciatica     Secondary hyperparathyroidism ([address]     Shingles     Shortness of breath     Sleep apnea     Stomach ulcer     Syncope     Type II diabetes mellitus with nephropathy (HCC)     Visual loss, one eye        Past Surgical History:    Past Surgical History:   Procedure Laterality Date    APPENDECTOMY      CARDIAC [facility]HETERIZATION  10/21/13  [facility]    EF over 60%    CHOLECYSTECTOMY      COLONOSCOPY  2/2010    negative    COLONOSCOPY  2/22/10    Dr Roney Bowden    COLONOSCOPY  4/1/16    Dr LAKHWINDER Horvath-internal hemorrhoids, 5 yr recall    EYE SURGERY      Cyst on Right eye    EYE SURGERY      GASTRIC BYPASS SURGERY      GASTRIC BYPASS SURGERY      HERNIA REPAIR      HYSTERECTOMY      Complete    HYSTERECTOMY      Partial - because had a tubal pregnancy.      INCONTINENCE SURGERY      Bladder Sling    OTHER SURGICAL HISTORY      IVC filter    PACEMAKER INSERTION      PACEMAKER PLACEMENT      medtronic    AK TOTAL KNEE ARTHROPLASTY Right 3/26/2018    TOTAL KNEE REPLACEMENT COMPLEX PRIMARY performed by Aman Thao MD at [address] [place] [address],[address]      SMALL INTESTINE SURGERY      SPLENECTOMY      KRISTEL AND BSO      TONSILLECTOMY AND ADENOIDECTOMY      UPPER GASTROINTESTINAL ENDOSCOPY  12/2011    gerd s/p gastric bypass    UPPER GASTROINTESTINAL ENDOSCOPY  2/2014    normal s/p gastric bypass    UPPER GASTROINTESTINAL ENDOSCOPY  2/2010    biopsy neg Barretts, chronic reflux esophagitis s/p gastric bypass    UPPER GASTROINTESTINAL ENDOSCOPY  7/2006    unremarkable s/p gastric bypass    UPPER GASTROINTESTINAL ENDOSCOPY  8/10/15    Dr Lizzy Emmanuel ENDOSCOPY  4/1/16    Dr Caryn Jenkins N/A 4/1/2016    EGD ESOPHAGOGASTRODUODENOSCOPY performed by Evelin Jurado MD at [facility] End[facility]    [facility] Right 2003       Current Medications:    Current Outpatient Medications   Medication Sig Dispense Refill    pantoprazole (PROTONIX) 40 MG tablet TAKE 1 TABLET BY MOUTH TWICE DAILY BEFORE MEAL(S) 30 tablet 0    lisinopril (PRINIVIL;ZESTRIL) 40 MG tablet Take 1 tablet by mouth once daily 90 tablet 1    gabapentin (NEURONTIN) 100 MG capsule TAKE 1 CAPSULE BY MOUTH THREE TIMES DAILY 270 capsule 0    escitalopram (LEXAPRO) 20 MG tablet Take 1 tablet by mouth daily 90 tablet 3    fexofenadine (ALLEGRA) 180 MG tablet Take 1 tablet by mouth daily Indications: Allergic Rhinitis 90 tablet 3    tiotropium (SPIRIVA RESPIMAT) 2.5 MCG/ACT AERS inhaler Inhale 2 puffs into the lungs daily 1 Inhaler 5    potassium chloride (KLOR-CON) 10 MEQ extended release tablet       warfarin (COUMADIN) 7.5 MG tablet Take 2 tablets by mouth daily 2 tablets daily, DO NOT RESUME UNTIL YOU RECHECK YOUR INR ON THURSDAY AND DISCUSS WITH PCP 60 tablet 0    baclofen (LIORESAL) 10 MG tablet Take 1 tablet by mouth 3 times daily As needed for hip pain 10 tablet 1    vitamin D (ERGOCALCIFEROL) 1.25 MG (06491 UT) CAPS capsule Take 1 capsule by mouth Twice a Week 12 capsule 1    calcipotriene (DOVONEX) 0.005 % cream Use topically as needed 3 Tube 3    clobetasol (TEMOVATE) 0.05 % cream Apply topically 2 times daily.  3 Tube 3    levothyroxine (SYNTHROID) 100 MCG tablet Take 1 tablet by mouth Daily 90 tablet 3    ondansetron (ZOFRAN) 4 MG tablet Take 1 tablet by mouth daily as needed for Nausea or Vomiting 30 tablet 0    Multiple Vitamins-Minerals (CENTRUM ADULTS) TABS Take 1 tablet by mouth daily thyroid mass or thyromegaly. Vascular: No JVD. Trachea: Trachea normal. No tracheal deviation. Cardiovascular:      Rate and Rhythm: Normal rate and regular rhythm. Heart sounds: Normal heart sounds. No murmur. No friction rub. No gallop. Pulmonary:      Effort: Pulmonary effort is normal. No respiratory distress. Breath sounds: Normal breath sounds. No wheezing or rales. Comments: Faint crackles in right lower lobe  Chest:      Chest wall: No tenderness. Abdominal:      General: Bowel sounds are normal. There is no distension. Palpations: Abdomen is soft. There is no mass. Tenderness: There is no abdominal tenderness. There is no guarding or rebound. Hernia: No hernia is present. Musculoskeletal:         General: No tenderness or deformity. Right lower leg: Edema (Chronic) present. Left lower leg: Edema (Chronic) present. Comments: Range of motion within normal limits x4 extremities   Skin:     General: Skin is warm. Coloration: Skin is not pale. Findings: No erythema or rash. Neurological:      Mental Status: She is alert and oriented to person, place, and time. Cranial Nerves: No cranial nerve deficit. Coordination: Coordination normal.   Psychiatric:         Behavior: Behavior normal.         Thought Content: Thought content normal.         Labs:  Lab Results   Component Value Date    WBC 5.09 02/25/2021    HGB 11.2 02/25/2021    HCT 34.6 02/25/2021    MCV 94.8 02/25/2021     (L) 02/25/2021     Lab Results   Component Value Date    NEUTROABS 2.59 02/25/2021         ASSESSMENT/PLAN:      1. Iron deficiency anemia with concern for malabsorption, hemoglobin has improved to 11.2 with an MCV of 94.8. Ferritin 259 on 10/28/2020. Currently taking iron supplement twice a day and reports occasional loose stools.     -Continue ferrous gluconate twice daily    Labs to be obtained today:  - Iron and TIBC;  Future  - Ferritin; Future  - Vitamin B12; Future  - Comprehensive Metabolic Panel; Future    2. Anticoagulated on Coumadin, due to history of recurrent DVTs and pulmonary emboli. Coumadin is currently managed by Dr. Adrián Funes. Reports INR of 3.1 on 2/24/2021 and currently taking Coumadin 15 mg p.o. daily.    -Continue chronic anticoagulation management under the direction of Dr. Adrián Funes with a goal INR between 2-3     3. Chronic thrombocytopenia that waxes and wanes. Platelet count of 102,491. Continues to deny any bleeding tendencies or excessive bruising.    -Continue with conservative monitoring. 4.  History of hypothyroidism, on Synthroid 1000 mcg daily. Managed by Dr. Adrián Funes. Requested a chest x-ray for further evaluation of fine crackles in right upper lobe and complaint of chest discomfort and shoulder discomfort. Encouraged to present to the emergency room if chest pain occurs. I discussed all of the above findings included in the assessment and plan with the patient and the patient is in agreement to move forward with current recommendations/treatment. I have addressed all of their questions and concerns that were verbalized. FOLLOW UP:  1. Follow-up appointment given for 4 months, sooner if needed  2. Continue to follow closely with Dr. Adrián Funes and other medical providers as recommended    Discussed precautions related to [address] and being at increased risk. Discussed proper handwashing to be done frequently, limit exposure to other individuals and maintain social distancing of 6 feet. Recommend contacting primary care provider if having respiratory symptoms for further recommendations and consideration for testing.     (Please note that portions of this note were completed with a voice recognition program. Efforts were made to edit the dictations but occasionally words are mis-transcribed,  Also, portions of this note have been copied forward, however, changed to reflect the most current clinical status of this patient.)    I, CHANELLE Abbott personally performed the services described in this documentation as scribed by Buffy Vasquez RN in my presence and is both accurate and complete.     Electronically signed by CHANELLE Valerio on 3/5/2021 at 10:24 AM

## 2021-02-25 NOTE — PROGRESS NOTES
After obtaining consent, and per orders of Dr. Yan Hernandez, injection of B12 given in Left deltoid by Judith Pennington. Patient instructed to remain in clinic for 20 minutes afterwards, and to report any adverse reaction to me immediately.

## 2021-03-03 ENCOUNTER — ANTI-COAG VISIT (OUTPATIENT)
Dept: INTERNAL MEDICINE | Age: 71
End: 2021-03-03
Payer: MEDICARE

## 2021-03-03 DIAGNOSIS — Z79.01 ANTICOAGULATED ON COUMADIN: ICD-10-CM

## 2021-03-03 LAB — INR BLD: 3.1

## 2021-03-03 PROCEDURE — 93793 ANTICOAG MGMT PT WARFARIN: CPT | Performed by: INTERNAL MEDICINE

## 2021-03-03 NOTE — PROGRESS NOTES
HOME MONITORING REPORT    INR today:   Results for orders placed or performed in visit on 03/03/21   Protime-INR   Result Value Ref Range    INR 3.10        INR Goal: 2.0-3.0    Dosing Plan  As of 3/3/2021    TTR:  20.1 % (2.8 y)   Full warfarin instructions:  3/3: 7.5 mg; Otherwise 15 mg every day              PLAN: Advised patient by voicemail to reduce tonight's dose to 7.5 mg, then to continue current dose and recheck in one week. Left message to call back if any questions    Electronically signed by Marisa Gutierres MD on 3/4/2021 at 5:25 AM    I have reviewed nursing plan for Coumadin management and agree with plan.

## 2021-03-05 ASSESSMENT — ENCOUNTER SYMPTOMS: SHORTNESS OF BREATH: 1

## 2021-03-05 NOTE — PROGRESS NOTES
Progress Note    Date:1/26/2020       Room:0426/426-02  Patient Name:Leny Stone     YOB: 1950     Age:69 y.o. Subjective   Interval History Status: not changed. Patient was seen this morning her room, stated she did not sleep well last night, still complains of abdominal pain in the right upper quadrants in the mid right quadrant area. Denies any current nausea or emesis wondering if she can start oral diet. Denies any chest pain or shortness of breath. Review of Systems   Review of Systems  12 point system reviewed and negative except as stated above.     Medications   Scheduled Meds:    phytonadione (ADULT)  5 mg Oral Once    [Held by provider] cloNIDine  0.1 mg Oral BID    escitalopram  20 mg Oral Daily    ferrous gluconate  324 mg Oral TID WC    levothyroxine  100 mcg Oral Daily    sodium chloride flush  10 mL Intravenous 2 times per day    enoxaparin  40 mg Subcutaneous Daily    metroNIDAZOLE  500 mg Intravenous Q8H    ciprofloxacin  400 mg Intravenous Q12H    [START ON 1/28/2020] pantoprazole  40 mg Intravenous Daily    And    [START ON 1/28/2020] sodium chloride (PF)  10 mL Intravenous Daily    dicyclomine  20 mg Intramuscular 4x Daily     Continuous Infusions:    pantoprozole (PROTONIX) infusion 8 mg/hr (01/25/20 2320)     PRN Meds: hydrALAZINE, sodium chloride flush, magnesium hydroxide, ondansetron, potassium chloride **OR** potassium alternative oral replacement **OR** potassium chloride, magnesium sulfate, HYDROmorphone    Past History    Past Medical History:   has a past medical history of Abdominal pain, Abnormal EKG, Acute sinusitis, Allergic reaction to spider bite, Anemia, Anticoagulated, Anxiety, Arrhythmia, Asthmatic bronchitis without complication, Ataxic gait, Lyons's esophagus, Bowel obstruction (HCC), Callus, Cardiac pacemaker, CKD (chronic kidney disease) stage 2, GFR 60-89 ml/min, Coat's syndrome, Coat's syndrome, Depression, Diabetes mellitus type 2 Summary (Last 24 hours) at 1/26/2020 1230  Last data filed at 1/26/2020 1028  Gross per 24 hour   Intake 600 ml   Output --   Net 600 ml       Physical Exam  General: No acute distress lying comfortably in bed. HEENT: Atraumatic normocephalic  Cardiac: Normal S1-S2 no murmurs rub or gallop. Respiratory: clear To auscultation bilaterally, no rhonchi or rales  Abdomen: + tenderness to palpation in right upper and mid quadrant. , no organomegaly noted. Extremities: no tenderness, no edema, moves all extremities  Psych: Affect normal and good eye contact        Labs/Imaging/Diagnostics   Labs:  CBC:  Recent Labs     01/25/20  1632 01/26/20  1035   WBC 5.0 5.6   RBC 4.28 3.93*   HGB 12.1 11.0*   HCT 38.9 38.4   MCV 90.9 97.7   RDW 16.3* 16.6*    163     CHEMISTRIES:  Recent Labs     01/25/20  1632 01/26/20  1035    138   K 4.3 4.4    106   CO2 21* 20*   BUN 36* 29*   CREATININE 1.3* 1.3*   GLUCOSE 101 88   MG 2.3 2.5*     PT/INR:  Recent Labs     01/25/20  1632 01/26/20  1035   PROTIME 48.7* 60.3*   INR 5.42* 7.08*     APTT:  Recent Labs     01/25/20  1632   APTT 50.7*     LIVER PROFILE:  Recent Labs     01/25/20  1632   *   ALT 73*   BILITOT 0.3   ALKPHOS 124*       Imaging Last 24 Hours:  Ct Kidney Wo Contrast    Result Date: 1/25/2020  CT abdomen and pelvis 1/25/2020 5:00 PM HISTORY: Right-sided flank pain with nausea and vomiting TECHNIQUE: Axial images of the abdomen and pelvis were obtained without IV contrast. Coronal and sagittal reformatted images are reconstructed and reviewed. COMPARISON: None. DLP: 1463 mGy cm Automated exposure control was utilized to minimize patient radiation dose. FINDINGS: There are no urinary tract stones. No hydronephrosis. Bladder appears intact. The nonenhanced liver is poorly prominent in size measuring 21 cm in length. No focal liver mass localized with nonenhanced imaging. Patient is undergone prior splenectomy. The nonenhanced pancreas is unremarkable. Adrenal glands are prominent but maintain adreniform shape. Gallbladder surgically absent. There is a small amount of nonspecific free fluid within the peritoneum. There is minimal perihepatic ascites with trace free fluid in the pelvis. The uterus is absent. No adnexal mass identified. There is no free intraperitoneal air or loculated abscess identified. There is no evidence for bowel obstruction. There are soft tissue nodules within the right mid abdomen posteriorly with adjacent surgical clips. These measure up to 1.8 cm IVC filter in place with the tip at the confluence of the renal veins. The abdominal aorta is normal in caliber with minimal calcification. There are postoperative changes involving the stomach. Wall thickening of the distal stomach is considered. Anterior abdominal wall mesh in place. Fatty-containing midline supraumbilical hernia defect seen above the level of the mesh. No herniation of bowel. Multiple collateral vascular structures seen of the lower anterior abdominal wall. Very small right pleural effusion. Cardiomegaly. Interlobular septal thickening and groundglass opacities within the lung bases suggest underlying edema. Osteopenia and degenerative change of the regional skeleton. 1. Postoperative changes of the stomach. Mild wall thickening of the distal stomach/duodenum considered and may represent an infectious/inflammatory process. There is stranding of the fat adjacent to the distal stomach and duodenum with no free air or abscess. Small volume of nonspecific ascites. No evidence for bowel obstruction. 2. IVC filter in place. Nodular foci within the right posterior mid abdomen measuring up to 1.8 cm of uncertain etiology. Small lymph nodes considered, however collateral vascular structures could have a similar appearance. Nonopacified bowel loops thought to be less likely.  A follow-up nonemergent CT abdomen and pelvis study with IV and oral contrast might be considered to reevaluate these structures if no outside studies that document stability. 3. Borderline hepatomegaly. 4. Cardiomegaly with small right pleural effusion and basilar edema. CHF is favored. 5. Previous splenectomy, cholecystectomy, and hysterectomy. Anterior abdominal wall mesh in place. Signed by Dr Jose Rolle on 1/25/2020 6:06 PM        Assessment        Hospital Problems           Last Modified POA    * (Principal) Intractable nausea and vomiting 1/25/2020 Yes    Gastroenteritis 3/83/5696 Yes    Colic 2/06/1119 Yes    Dehydration 1/25/2020 Yes    Abdominal pain 1/25/2020 Yes          Plan:          69-year-old lady with a history of type 2 diabetes, GERD, abdominal pain, depression, DVT, anemias, gastric bypass, osteoarthritis, presented to the hospital with concerns of abdominal pain. CT of the abdomen was obtained with concerns of mild wall thickening of the distal stomach/duodenum which may represent infectious inflammatory process. Patient was initiated on ciprofloxacin and Flagyl as well as IV fluids. Intractable nausea and vomiting  Gastroenteritis with colicky abdominal pain  Continue ciprofloxacin and Flagyl for possible infectious process  Zofran for nausea  Continue Bentyl for spasms  Pain medication with Dilaudid  Bowel regimen to prevent constipation    History of hypertension with BP currently normotensive  Hold clonidine as blood pressure currently marginal  Continue IV fluids for now    Depression continue Lexapro  Hypothyroidism continue Synthroid  GERD: Pantoprazole      Code: Full  DVT prophylaxis: Enoxaparin  Diet: Full liquids for now advance as tolerated  Disposition: Pending improvement in abdominal pain.       Electronically signed by Ruperto Adan MD on 1/26/20 at 12:30 PM Yes

## 2021-03-10 ENCOUNTER — ANTI-COAG VISIT (OUTPATIENT)
Dept: INTERNAL MEDICINE | Age: 71
End: 2021-03-10
Payer: MEDICARE

## 2021-03-10 DIAGNOSIS — Z79.01 ANTICOAGULATED ON COUMADIN: ICD-10-CM

## 2021-03-10 LAB — INR BLD: 2.5

## 2021-03-10 PROCEDURE — 93793 ANTICOAG MGMT PT WARFARIN: CPT | Performed by: INTERNAL MEDICINE

## 2021-03-11 DIAGNOSIS — E11.9 TYPE 2 DIABETES MELLITUS WITHOUT COMPLICATION, UNSPECIFIED WHETHER LONG TERM INSULIN USE (HCC): ICD-10-CM

## 2021-03-11 DIAGNOSIS — E55.9 VITAMIN D DEFICIENCY: ICD-10-CM

## 2021-03-11 DIAGNOSIS — E53.8 B12 DEFICIENCY: ICD-10-CM

## 2021-03-11 LAB
ALBUMIN SERPL-MCNC: 4 G/DL (ref 3.5–5.2)
ALP BLD-CCNC: 72 U/L (ref 35–104)
ALT SERPL-CCNC: 9 U/L (ref 5–33)
ANION GAP SERPL CALCULATED.3IONS-SCNC: 10 MMOL/L (ref 7–19)
AST SERPL-CCNC: 21 U/L (ref 5–32)
BASOPHILS ABSOLUTE: 0 K/UL (ref 0–0.2)
BASOPHILS RELATIVE PERCENT: 0.6 % (ref 0–1)
BILIRUB SERPL-MCNC: <0.2 MG/DL (ref 0.2–1.2)
BUN BLDV-MCNC: 28 MG/DL (ref 8–23)
CALCIUM SERPL-MCNC: 8.8 MG/DL (ref 8.8–10.2)
CHLORIDE BLD-SCNC: 106 MMOL/L (ref 98–111)
CHOLESTEROL, TOTAL: 225 MG/DL (ref 160–199)
CO2: 25 MMOL/L (ref 22–29)
CREAT SERPL-MCNC: 1.4 MG/DL (ref 0.5–0.9)
CREATININE URINE: 137 MG/DL (ref 4.2–622)
CREATININE URINE: 141.8 MG/DL (ref 4.2–622)
EOSINOPHILS ABSOLUTE: 0.1 K/UL (ref 0–0.6)
EOSINOPHILS RELATIVE PERCENT: 2.8 % (ref 0–5)
GFR AFRICAN AMERICAN: 45
GFR NON-AFRICAN AMERICAN: 37
GLUCOSE BLD-MCNC: 84 MG/DL (ref 74–109)
HBA1C MFR BLD: 5.6 % (ref 4–6)
HCT VFR BLD CALC: 34.7 % (ref 37–47)
HDLC SERPL-MCNC: 83 MG/DL (ref 65–121)
HEMOGLOBIN: 10.7 G/DL (ref 12–16)
IMMATURE GRANULOCYTES #: 0 K/UL
LDL CHOLESTEROL CALCULATED: 120 MG/DL
LYMPHOCYTES ABSOLUTE: 2.1 K/UL (ref 1.1–4.5)
LYMPHOCYTES RELATIVE PERCENT: 45 % (ref 20–40)
MCH RBC QN AUTO: 29.3 PG (ref 27–31)
MCHC RBC AUTO-ENTMCNC: 30.8 G/DL (ref 33–37)
MCV RBC AUTO: 95.1 FL (ref 81–99)
MICROALBUMIN UR-MCNC: <1.2 MG/DL (ref 0–19)
MICROALBUMIN/CREAT UR-RTO: NORMAL MG/G
MONOCYTES ABSOLUTE: 0.5 K/UL (ref 0–0.9)
MONOCYTES RELATIVE PERCENT: 11 % (ref 0–10)
NEUTROPHILS ABSOLUTE: 1.9 K/UL (ref 1.5–7.5)
NEUTROPHILS RELATIVE PERCENT: 40.4 % (ref 50–65)
PARATHYROID HORMONE INTACT: 159.6 PG/ML (ref 15–65)
PDW BLD-RTO: 15.9 % (ref 11.5–14.5)
PLATELET # BLD: 161 K/UL (ref 130–400)
PMV BLD AUTO: 11.6 FL (ref 9.4–12.3)
POTASSIUM SERPL-SCNC: 4.4 MMOL/L (ref 3.5–5)
PROTEIN PROTEIN: 14 MG/DL (ref 15–45)
RBC # BLD: 3.65 M/UL (ref 4.2–5.4)
SODIUM BLD-SCNC: 141 MMOL/L (ref 136–145)
TOTAL PROTEIN: 7.4 G/DL (ref 6.6–8.7)
TRIGL SERPL-MCNC: 111 MG/DL (ref 0–149)
TSH SERPL DL<=0.05 MIU/L-ACNC: 1.91 UIU/ML (ref 0.27–4.2)
VITAMIN B-12: 806 PG/ML (ref 211–946)
VITAMIN D 25-HYDROXY: 25.4 NG/ML
WBC # BLD: 4.6 K/UL (ref 4.8–10.8)

## 2021-03-15 ASSESSMENT — PATIENT HEALTH QUESTIONNAIRE - PHQ9
SUM OF ALL RESPONSES TO PHQ QUESTIONS 1-9: 15
SUM OF ALL RESPONSES TO PHQ9 QUESTIONS 1 & 2: 3
7. TROUBLE CONCENTRATING ON THINGS, SUCH AS READING THE NEWSPAPER OR WATCHING TELEVISION: 3
9. THOUGHTS THAT YOU WOULD BE BETTER OFF DEAD, OR OF HURTING YOURSELF: 0
5. POOR APPETITE OR OVEREATING: 3
SUM OF ALL RESPONSES TO PHQ QUESTIONS 1-9: 15
1. LITTLE INTEREST OR PLEASURE IN DOING THINGS: 0
6. FEELING BAD ABOUT YOURSELF - OR THAT YOU ARE A FAILURE OR HAVE LET YOURSELF OR YOUR FAMILY DOWN: 3
8. MOVING OR SPEAKING SO SLOWLY THAT OTHER PEOPLE COULD HAVE NOTICED. OR THE OPPOSITE, BEING SO FIGETY OR RESTLESS THAT YOU HAVE BEEN MOVING AROUND A LOT MORE THAN USUAL: 0
4. FEELING TIRED OR HAVING LITTLE ENERGY: 3

## 2021-03-15 ASSESSMENT — LIFESTYLE VARIABLES
HOW OFTEN DO YOU HAVE A DRINK CONTAINING ALCOHOL: 0
AUDIT TOTAL SCORE: INCOMPLETE
HOW OFTEN DO YOU HAVE A DRINK CONTAINING ALCOHOL: NEVER
AUDIT-C TOTAL SCORE: INCOMPLETE

## 2021-03-18 ENCOUNTER — OFFICE VISIT (OUTPATIENT)
Dept: INTERNAL MEDICINE | Age: 71
End: 2021-03-18
Payer: MEDICARE

## 2021-03-18 VITALS
WEIGHT: 240.2 LBS | HEART RATE: 86 BPM | HEIGHT: 67 IN | OXYGEN SATURATION: 98 % | DIASTOLIC BLOOD PRESSURE: 72 MMHG | SYSTOLIC BLOOD PRESSURE: 122 MMHG | BODY MASS INDEX: 37.7 KG/M2

## 2021-03-18 DIAGNOSIS — F33.2 SEVERE EPISODE OF RECURRENT MAJOR DEPRESSIVE DISORDER, WITHOUT PSYCHOTIC FEATURES (HCC): ICD-10-CM

## 2021-03-18 DIAGNOSIS — M62.830 BACK MUSCLE SPASM: ICD-10-CM

## 2021-03-18 DIAGNOSIS — I82.5Y9 CHRONIC DEEP VEIN THROMBOSIS (DVT) OF PROXIMAL VEIN OF LOWER EXTREMITY, UNSPECIFIED LATERALITY (HCC): ICD-10-CM

## 2021-03-18 DIAGNOSIS — Q87.89: ICD-10-CM

## 2021-03-18 DIAGNOSIS — Z00.00 ROUTINE ADULT HEALTH MAINTENANCE: ICD-10-CM

## 2021-03-18 DIAGNOSIS — E55.9 VITAMIN D DEFICIENCY: ICD-10-CM

## 2021-03-18 DIAGNOSIS — N18.9 CHRONIC KIDNEY DISEASE, UNSPECIFIED CKD STAGE: ICD-10-CM

## 2021-03-18 DIAGNOSIS — E03.9 HYPOTHYROIDISM, UNSPECIFIED TYPE: ICD-10-CM

## 2021-03-18 DIAGNOSIS — Z12.31 ENCOUNTER FOR SCREENING MAMMOGRAM FOR MALIGNANT NEOPLASM OF BREAST: Primary | ICD-10-CM

## 2021-03-18 DIAGNOSIS — E11.49 OTHER DIABETIC NEUROLOGICAL COMPLICATION ASSOCIATED WITH TYPE 2 DIABETES MELLITUS (HCC): ICD-10-CM

## 2021-03-18 DIAGNOSIS — R60.0 LOWER EXTREMITY EDEMA: ICD-10-CM

## 2021-03-18 DIAGNOSIS — Q74.0: ICD-10-CM

## 2021-03-18 DIAGNOSIS — K21.9 GASTROESOPHAGEAL REFLUX DISEASE WITHOUT ESOPHAGITIS: ICD-10-CM

## 2021-03-18 DIAGNOSIS — I10 ESSENTIAL HYPERTENSION: ICD-10-CM

## 2021-03-18 DIAGNOSIS — D68.62 LUPUS ANTICOAGULANT DISORDER (HCC): ICD-10-CM

## 2021-03-18 DIAGNOSIS — M32.9 SYSTEMIC LUPUS ERYTHEMATOSUS, UNSPECIFIED SLE TYPE, UNSPECIFIED ORGAN INVOLVEMENT STATUS (HCC): ICD-10-CM

## 2021-03-18 DIAGNOSIS — D50.9 IRON DEFICIENCY ANEMIA, UNSPECIFIED IRON DEFICIENCY ANEMIA TYPE: ICD-10-CM

## 2021-03-18 DIAGNOSIS — E66.01 MORBIDLY OBESE (HCC): ICD-10-CM

## 2021-03-18 DIAGNOSIS — Z13.31 POSITIVE DEPRESSION SCREENING: ICD-10-CM

## 2021-03-18 DIAGNOSIS — I20.8 STABLE ANGINA (HCC): ICD-10-CM

## 2021-03-18 DIAGNOSIS — Z91.09 ENVIRONMENTAL ALLERGIES: ICD-10-CM

## 2021-03-18 PROCEDURE — G0439 PPPS, SUBSEQ VISIT: HCPCS | Performed by: INTERNAL MEDICINE

## 2021-03-18 PROCEDURE — G8431 POS CLIN DEPRES SCRN F/U DOC: HCPCS | Performed by: INTERNAL MEDICINE

## 2021-03-18 PROCEDURE — 3017F COLORECTAL CA SCREEN DOC REV: CPT | Performed by: INTERNAL MEDICINE

## 2021-03-18 PROCEDURE — 1123F ACP DISCUSS/DSCN MKR DOCD: CPT | Performed by: INTERNAL MEDICINE

## 2021-03-18 PROCEDURE — 4040F PNEUMOC VAC/ADMIN/RCVD: CPT | Performed by: INTERNAL MEDICINE

## 2021-03-18 PROCEDURE — G8484 FLU IMMUNIZE NO ADMIN: HCPCS | Performed by: INTERNAL MEDICINE

## 2021-03-18 PROCEDURE — 3044F HG A1C LEVEL LT 7.0%: CPT | Performed by: INTERNAL MEDICINE

## 2021-03-18 RX ORDER — CALCITRIOL 0.25 UG/1
0.25 CAPSULE, LIQUID FILLED ORAL DAILY
COMMUNITY
End: 2022-03-29

## 2021-03-18 RX ORDER — PANTOPRAZOLE SODIUM 40 MG/1
TABLET, DELAYED RELEASE ORAL
Qty: 30 TABLET | Refills: 0 | Status: CANCELLED | OUTPATIENT
Start: 2021-03-18

## 2021-03-18 RX ORDER — LEVOTHYROXINE SODIUM 0.1 MG/1
100 TABLET ORAL DAILY
Qty: 90 TABLET | Refills: 3 | Status: SHIPPED | OUTPATIENT
Start: 2021-03-18 | End: 2022-02-14 | Stop reason: SDUPTHER

## 2021-03-18 RX ORDER — LISINOPRIL 40 MG/1
TABLET ORAL
Qty: 90 TABLET | Refills: 1 | Status: SHIPPED | OUTPATIENT
Start: 2021-03-18 | End: 2022-02-14 | Stop reason: SDUPTHER

## 2021-03-18 RX ORDER — PANTOPRAZOLE SODIUM 40 MG/1
TABLET, DELAYED RELEASE ORAL
Qty: 30 TABLET | Refills: 0 | Status: SHIPPED | OUTPATIENT
Start: 2021-03-18 | End: 2021-04-07 | Stop reason: SDUPTHER

## 2021-03-18 RX ORDER — BACLOFEN 10 MG/1
10 TABLET ORAL 3 TIMES DAILY
Qty: 10 TABLET | Refills: 1 | Status: SHIPPED | OUTPATIENT
Start: 2021-03-18 | End: 2022-03-01 | Stop reason: SDUPTHER

## 2021-03-18 RX ORDER — BUMETANIDE 2 MG/1
2 TABLET ORAL DAILY
Qty: 30 TABLET | Refills: 5 | Status: SHIPPED | OUTPATIENT
Start: 2021-03-18 | End: 2022-02-14 | Stop reason: SDUPTHER

## 2021-03-18 NOTE — PROGRESS NOTES
Chief Complaint   Patient presents with    Medicare AWV       HPI: Serina Ramos is a 69 y.o. female is here for her annual Medicare wellness exam.  Her functional status is good. She has not had any recent falls. She has no concerns in regards to safety, kearing, or cognition. She does see Dr. Surya Nieves for history of anemia. She sees Dr. Nicole Wolf for chronic kidney disease. Both issues are currently stable. She states that she has been having some right-sided chest pain. Dr. Surya Nieves wanted her to get a chest x-ray. She still has yet to get this done. I recommended that she go ahead and get this done today before she leaves. Her neuropathy is stable. Her acid reflux is well controlled. She does have a history of vitamin D deficiency. She is taking her: As a trial.  She has not had any lower extremity swelling on Bumex. Her thyroid function is currently stable. She has not been checking her blood sugars. Her A1c is 5.8. Her cholesterol dropped from 2 47-2 25. She still complains of some mild fatigue at times. However, she has not had her B12 injection in some time. She plans to bring her B12 and later today to get the B12 injection done. Her reflux is currently well controlled. Her blood pressure remained stable. Her mood is stable on Lexapro. She is not having any difficulty breathing. 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 She is on Coumadin therapy.     Routine screening is as follows:  Eye exam per Dr. Jaguar Celestin  Flu vaccine up to date  Pap declined  Mammogram 5/2020, due 5/2021  Colonoscopy 2016, has a letter to get this scheduled per her gastroenterologist  ANDERSON 5/2020  Pneumovax up to date    Past Medical History:   Diagnosis Date    Abdominal pain     Abnormal EKG     Acute sinusitis     Allergic reaction to spider bite     Anemia     Anticoagulated     Anxiety     Arrhythmia     Asthmatic bronchitis without complication 3/14/3378    Ataxic gait     Lyons's esophagus PACEMAKER PLACEMENT      medtronic    [place] TOTAL KNEE ARTHROPLASTY Right 3/26/2018    TOTAL KNEE REPLACEMENT COMPLEX PRIMARY performed by Felix Casas MD at [address] [place] [address]      SPLENECTOMY      KRISTEL AND BSO      TONSILLECTOMY AND ADENOIDECTOMY      UPPER GASTROINTESTINAL ENDOSCOPY  12/2011    gerd s/p gastric bypass    UPPER GASTROINTESTINAL ENDOSCOPY  2/2014    normal s/p gastric bypass    UPPER GASTROINTESTINAL ENDOSCOPY  2/2010    biopsy neg Barretts, chronic reflux esophagitis s/p gastric bypass    UPPER GASTROINTESTINAL ENDOSCOPY  7/2006    unremarkable s/p gastric bypass    UPPER GASTROINTESTINAL ENDOSCOPY  8/10/15    Dr Claudette Bloch UPPER GASTROINTESTINAL ENDOSCOPY  4/1/16    Dr Alaina Gilman    UPPER GASTROINTESTINAL ENDOSCOPY N/A 4/1/2016    EGD ESOPHAGOGASTRODUODENOSCOPY performed by [facility]zi [facility]les MD at [facility] Endoscopy    [facility] Right 2003      Social History     Socioeconomic History    Marital status:      Spouse name: None    Number of children: None    Years of education: None    Highest education level: None   Occupational History     Employer: [employer]    Social Needs    Financial resource strain: None    Food insecurity     Worry: None     Inability: None    Transportation needs     Medical: None     Non-medical: None   Tobacco Use    Smoking status: Never Smoker    Smokeless tobacco: Never Used   Substance and Sexual Activity    Alcohol use: No    Drug use: No    Sexual activity: Not Currently   Lifestyle    Physical activity     Days per week: None     Minutes per session: None    Stress: None   Relationships    Social connections     Talks on phone: None     Gets together: None     Attends Christian service: None     Active member of club or organization: None     Attends meetings of clubs or organizations: None     Relationship status: None    Intimate partner violence     Fear of current or ex partner: None     Emotionally abused: None     Physically abused: None     Forced sexual activity: None   Other Topics Concern    None   Social History Narrative    None      Family History   Problem Relation Age of Onset    Uterine Cancer Mother     Cervical Cancer Mother     Coronary Art Dis Mother     Heart Disease Father     Lung Cancer Father     Other Father         renal failure    Colon Cancer Brother     Colon Polyps Brother     Uterine Cancer Maternal Grandmother     Cervical Cancer Maternal Grandmother     Diabetes Maternal Grandmother     Cervical Cancer Sister     Other Sister         fibromyalgia    Diabetes Paternal Aunt     Esophageal Cancer Neg Hx     Liver Cancer Neg Hx     Liver Disease Neg Hx     Stomach Cancer Neg Hx     Rectal Cancer Neg Hx         Current Outpatient Medications   Medication Sig Dispense Refill    gabapentin (NEURONTIN) 100 MG capsule TAKE 1 CAPSULE BY MOUTH THREE TIMES DAILY 270 capsule 0    pantoprazole (PROTONIX) 40 MG tablet TAKE 1 TABLET BY MOUTH TWICE DAILY BEFORE MEAL(S) 30 tablet 0    ferrous gluconate (FERGON) 240 (27 Fe) MG tablet Take 1 tablet by mouth 2 times daily 180 tablet 2    calcitRIOL (ROCALTROL) 0.25 MCG capsule Take 0.25 mcg by mouth daily      bumetanide (BUMEX) 2 MG tablet Take 1 tablet by mouth daily 30 tablet 5    levothyroxine (SYNTHROID) 100 MCG tablet Take 1 tablet by mouth Daily 90 tablet 3    lisinopril (PRINIVIL;ZESTRIL) 40 MG tablet Take 1 tablet by mouth once daily 90 tablet 1    baclofen (LIORESAL) 10 MG tablet Take 1 tablet by mouth 3 times daily As needed for hip pain 10 tablet 1    pantoprazole (PROTONIX) 40 MG tablet TAKE 1 TABLET BY MOUTH TWICE DAILY BEFORE MEAL(S) 30 tablet 0    escitalopram (LEXAPRO) 20 MG tablet Take 1 tablet by mouth daily 90 tablet 3    fexofenadine (ALLEGRA) 180 MG tablet Take 1 tablet by mouth daily Indications:  Allergic Rhinitis 90 tablet 3    tiotropium (SPIRIVA RESPIMAT) 2.5 MCG/ACT AERS inhaler Inhale 2 puffs into the lungs daily 1 Inhaler 5    potassium chloride (KLOR-CON) 10 MEQ extended release tablet       warfarin (COUMADIN) 7.5 MG tablet Take 2 tablets by mouth daily 2 tablets daily, DO NOT RESUME UNTIL YOU RECHECK YOUR INR ON THURSDAY AND DISCUSS WITH PCP 60 tablet 0    calcipotriene (DOVONEX) 0.005 % cream Use topically as needed 3 Tube 3    clobetasol (TEMOVATE) 0.05 % cream Apply topically 2 times daily.  3 Tube 3    ondansetron (ZOFRAN) 4 MG tablet Take 1 tablet by mouth daily as needed for Nausea or Vomiting 30 tablet 0    Multiple Vitamins-Minerals (CENTRUM ADULTS) TABS Take 1 tablet by mouth daily      aspirin 81 MG tablet Take 81 mg by mouth daily       Current Facility-Administered Medications   Medication Dose Route Frequency Provider Last Rate Last Admin    cyanocobalamin injection 1,000 mcg  1,000 mcg Intramuscular Once Erin Escalante MD            Patient Active Problem List   Diagnosis    Vomiting    Burping    GERD (gastroesophageal reflux disease)    History of gastric bypass    Family history of colon cancer    Bloating    Enuresis    Nausea and vomiting    Stable angina ([address]    Encounter for current long-term use of anticoagulants    Primary osteoarthritis of right knee    Arthritis of knee    Essential hypertension    Hyperglycemia    MER (obstructive sleep apnea)    Slow transit constipation    Iron deficiency anemia    Restrictive airway disease    DVT, lower extremity, proximal, acute, unspecified laterality ([address]    H/O systemic lupus erythematosus (SLE) ([address]    Anticoagulated on Coumadin    Burn of abdomen wall, second degree, initial encounter    Postmenopausal osteoporosis    Type II diabetes mellitus with nephropathy (HCC)    Cellulitis of left lower extremity    Pacemaker    History of DVT (deep vein thrombosis)    Lupus anticoagulant disorder (HCC)    History of pulmonary embolism    Thrombocytopenia (HCC)  Hypothyroidism    Morbidly obese (HCC)    Asthmatic bronchitis without complication    Gastroenteritis    Colic    Abdominal pain    Intractable nausea and vomiting    Severe episode of recurrent major depressive disorder, without psychotic features ([address]    COVID-19    Generalized weakness    Accidental fall from bed    Palliative care patient        Review of Systems   Constitutional: Positive for fatigue. Negative for activity change, appetite change, chills, diaphoresis, fever and unexpected weight change. HENT: Negative for congestion, ear pain, hearing loss, nosebleeds, postnasal drip, rhinorrhea, sinus pressure, sinus pain, sneezing, sore throat, tinnitus, trouble swallowing and voice change. Eyes: Negative for discharge and itching. Watery eyes   Respiratory: Positive for shortness of breath. Negative for apnea, cough, chest tightness, wheezing and stridor. Improving, but she did have a recent COVID-19 infection. Cardiovascular: Positive for leg swelling. Negative for chest pain and palpitations. Left leg swelling; chronic secondary to DVT. Gastrointestinal: Negative for abdominal distention, abdominal pain, blood in stool, constipation, diarrhea, nausea and vomiting. Endocrine: Negative for cold intolerance, heat intolerance, polydipsia, polyphagia and polyuria. Genitourinary: Negative for difficulty urinating, dysuria, flank pain, frequency, hematuria and urgency. Musculoskeletal: Positive for arthralgias and back pain. Negative for gait problem, joint swelling, myalgias, neck pain and neck stiffness. She had bilateral knee pain. Right hip pain   Skin: Negative for color change, pallor, rash and wound. Allergic/Immunologic: Positive for environmental allergies. Negative for food allergies and immunocompromised state.    Neurological: Negative for dizziness, tremors, seizures, syncope, facial asymmetry, speech difficulty, weakness, light-headedness, numbness and headaches. Hematological: Negative for adenopathy. Does not bruise/bleed easily. Psychiatric/Behavioral: Positive for dysphoric mood. Negative for agitation, confusion, decreased concentration and hallucinations. The patient is not nervous/anxious and is not hyperactive. /72   Pulse 86   Ht 5' 7\" (1.702 m)   Wt 240 lb 3.2 oz (109 kg)   SpO2 98%   BMI 37.62 kg/m²   Physical Exam  Vitals signs and nursing note reviewed. Constitutional:       General: She is not in acute distress. Appearance: Normal appearance. She is well-developed. She is obese. She is not ill-appearing, toxic-appearing or diaphoretic. HENT:      Head: Normocephalic and atraumatic. Right Ear: Tympanic membrane, ear canal and external ear normal. There is no impacted cerumen. Left Ear: Tympanic membrane, ear canal and external ear normal.      Nose: Nose normal. No congestion or rhinorrhea. Mouth/Throat:      Mouth: Mucous membranes are moist.      Pharynx: Oropharynx is clear. No oropharyngeal exudate or posterior oropharyngeal erythema. Eyes:      General: No scleral icterus. Right eye: No discharge. Left eye: No discharge. Extraocular Movements: Extraocular movements intact. Conjunctiva/sclera: Conjunctivae normal.      Pupils: Pupils are equal, round, and reactive to light. Neck:      Musculoskeletal: Normal range of motion. No neck rigidity or muscular tenderness. Thyroid: No thyromegaly. Vascular: No carotid bruit or JVD. Trachea: No tracheal deviation. Cardiovascular:      Rate and Rhythm: Normal rate and regular rhythm. Pulses: Normal pulses. Heart sounds: Normal heart sounds. No murmur. No friction rub. No gallop. Pulmonary:      Effort: Pulmonary effort is normal. No respiratory distress. Breath sounds: Normal breath sounds. No stridor. No wheezing, rhonchi or rales.    Chest:      Chest wall: No involvement status ([facility]    Severe episode of recurrent major depressive disorder, without psychotic features ([facility]    Other diabetic neurological complication associated with type 2 diabetes mellitus ([facility]    Chronic deep vein thrombosis (DVT) of proximal vein of lower extremity, unspecified laterality (HCC)    Morbidly obese (HCC)    Lupus anticoagulant disorder ([facility]    Stable angina ([facility]    Other orders  -     bumetanide (BUMEX) 2 MG tablet; Take 1 tablet by mouth daily  -     levothyroxine (SYNTHROID) 100 MCG tablet; Take 1 tablet by mouth Daily  -     lisinopril (PRINIVIL;ZESTRIL) 40 MG tablet; Take 1 tablet by mouth once daily  -     baclofen (LIORESAL) 10 MG tablet; Take 1 tablet by mouth 3 times daily As needed for hip pain  -     pantoprazole (PROTONIX) 40 MG tablet; TAKE 1 TABLET BY MOUTH TWICE DAILY BEFORE MEAL(S)          No follow-ups on file. Orders Placed This Encounter   Procedures    KATELIN DIGITAL SCREEN W OR WO CAD BILATERAL     Standing Status:   Future     Standing Expiration Date:   2022     Order Specific Question:   Reason for exam:     Answer:   screening mammogram     Order Specific Question:   Does this patient have implants? Answer:   No     Order Specific Question:   Does this patient have a personal history of breast cancer? Answer:   No     Order Specific Question:   [facility] was [facility]? Answer[facility]     Order Specific Question:   When was last mammogram performed? Answer:   2021       Sandro De Los Santos MD     Medicare Annual Wellness Visit - Subsequent    Name: Raza Coleman Date: 3/19/2021   MRN: [id number] Sex: Female   Age: 69 y.o. Ethnicity: Non-/Non    : 1950 Race: Maik Ivettechristine is here for   Chief Complaint   Patient presents with   [facility]        Screenings for behavioral, psychosocial and functional/safety risks, and cognitive dysfunction are all negative except as indicated below.  These results, as well as other patient data from the [address] form, are documented in Flowsheets linked to this Encounter. Allergies   Allergen Reactions    Insect Extract Allergy Skin Test Anaphylaxis     Bee stings    Lactose Intolerance (Gi)     Prednisone      Headache and upset stomach    Zanaflex [Tizanidine Hcl]      Passed out lost control of body functions    Lortab [Hydrocodone-Acetaminophen] Nausea And Vomiting     Sweats, weak, nausea and vomiting    Other Nausea And Vomiting     Opiates------sweating, weak        Oxycodone-Acetaminophen Nausea And Vomiting     Sweating and vomiting     Ultram [Tramadol Hcl] Nausea And Vomiting     Sweating, weak, nausea and vomiting         Prior to Visit Medications    Medication Sig Taking? Authorizing Provider   gabapentin (NEURONTIN) 100 MG capsule TAKE 1 CAPSULE BY MOUTH THREE TIMES DAILY Yes Shannan Hutchinson MD   pantoprazole (PROTONIX) 40 MG tablet TAKE 1 TABLET BY MOUTH TWICE DAILY BEFORE MEAL(S) Yes Shannan Hutchinson MD   ferrous gluconate (FERGON) 240 (27 Fe) MG tablet Take 1 tablet by mouth 2 times daily Yes Shannan Hutchinson MD   calcitRIOL (ROCALTROL) 0.25 MCG capsule Take 0.25 mcg by mouth daily Yes Historical Provider, MD   bumetanide (BUMEX) 2 MG tablet Take 1 tablet by mouth daily Yes Shannan Hutchinson MD   levothyroxine (SYNTHROID) 100 MCG tablet Take 1 tablet by mouth Daily Yes Shannan Hutchinson MD   lisinopril (PRINIVIL;ZESTRIL) 40 MG tablet Take 1 tablet by mouth once daily Yes Shannan Hutchinson MD   baclofen (LIORESAL) 10 MG tablet Take 1 tablet by mouth 3 times daily As needed for hip pain Yes Shannan Hutchinson MD   pantoprazole (PROTONIX) 40 MG tablet TAKE 1 TABLET BY MOUTH TWICE DAILY BEFORE MEAL(S) Yes Shannan Hutchinson MD   escitalopram (LEXAPRO) 20 MG tablet Take 1 tablet by mouth daily Yes Shannan Hutchinson MD   fexofenadine (ALLEGRA) 180 MG tablet Take 1 tablet by mouth daily Indications:  Allergic Rhinitis Yes Regina L Tl Rodriges MD   tiotropium (SPIRIVA RESPIMAT) 2.5 MCG/ACT AERS inhaler Inhale 2 puffs into the lungs daily Yes Beny Hope MD   potassium chloride (KLOR-CON) 10 MEQ extended release tablet  Yes Historical Provider, MD   warfarin (COUMADIN) 7.5 MG tablet Take 2 tablets by mouth daily 2 tablets daily, DO NOT RESUME UNTIL YOU RECHECK YOUR INR ON THURSDAY AND DISCUSS WITH PCP Yes Bunnie Severs, DO   calcipotriene (DOVONEX) 0.005 % cream Use topically as needed Yes Beny Hope MD   clobetasol (TEMOVATE) 0.05 % cream Apply topically 2 times daily.  Yes Beny Hope MD   ondansetron (ZOFRAN) 4 MG tablet Take 1 tablet by mouth daily as needed for Nausea or Vomiting Yes Beny Hope MD   Multiple Vitamins-Minerals (CENTRUM ADULTS) TABS Take 1 tablet by mouth daily Yes Historical Provider, MD   aspirin 81 MG tablet Take 81 mg by mouth daily Yes Historical Provider, MD       Past Medical History:   Diagnosis Date    Abdominal pain     Abnormal EKG     Acute sinusitis     Allergic reaction to spider bite     Anemia     Anticoagulated     Anxiety     Arrhythmia     Asthmatic bronchitis without complication 0/10/3135    Ataxic gait     Lyons's esophagus     Bowel obstruction (HCC)     Callus     Cardiac pacemaker     CKD (chronic kidney disease) stage 2, GFR 60-89 ml/min     Coat's syndrome     Coat's syndrome     both eyes    Depression     Diabetes mellitus type 2 in nonobese ([facility]     Diabetic nephropathy (HCC)     Disequilibrium     Dizziness     DVT (deep venous thrombosis) (HCC)     Exudative retinopathy     Fibromyalgia     Fibromyositis     Gastric ulcer     GERD (gastroesophageal reflux disease)     History of gastric bypass     Hx of lupus anticoagulant disorder     Hypertension     Hypothyroidism     Intermittent claudication (HCC)     Intestinal obstruction (HCC)     Iron deficiency     Left-sided weakness     Low vitamin D level     Lupus ([facility]     Menopause     Obesity     Osteoarthritis     Osteoporosis     Other iron deficiency anemias     Palliative care patient 09/24/2020    Pernicious anemia     PUPP (pruritic urticarial papules and plaques of pregnancy)     Right leg numbness     Right sided sciatica     Sarcoidosis     with liver involvement    Sciatica     Secondary hyperparathyroidism ([address]     Shingles     Shortness of breath     Sleep apnea     Stomach ulcer     Syncope     Type II diabetes mellitus with nephropathy (HCC)     Visual loss, one eye        Past Surgical History:   Procedure Laterality Date    APPENDECTOMY      CARD[facility]AC [facility]HE[facility]  [facility]    EF over 60%    CHOLECYSTECTOMY      COLONOSCOPY  2/2010    negative    COLONOSCOPY  2/22/10    Dr Lucinda Marshall    COLONOSCOPY  4/1/16    Dr LAKHWINDER Horvath-internal hemorrhoids, 5 yr recall    EYE SURGERY      Cyst on Right eye    EYE SURGERY      GASTRIC BYPASS SURGERY      GASTRIC BYPASS SURGERY      HERNIA REPAIR      HYSTERECTOMY      Complete    HYSTERECTOMY      Partial - because had a tubal pregnancy.      INCONTINENCE SURGERY      Bladder Sling    OTHER SURGICAL HISTORY      IVC filter    PACEMAKER INSERTION      PACEMAKER PLACEMENT      medtronic    [place] TOTAL KNEE ARTHROPLASTY Right 3/26/2018    TOTAL KNEE REPLACEMENT COMPLEX PRIMARY performed by Felix Casas MD at [address] [place] [address]      SPLENECTOMY      KRISTEL AND BSO      TONSILLECTOMY AND ADENOIDECTOMY      UPPER GASTROINTESTINAL ENDOSCOPY  12/2011    gerd s/p gastric bypass    UPPER GASTROINTESTINAL ENDOSCOPY  2/2014    normal s/p gastric bypass    UPPER GASTROINTESTINAL ENDOSCOPY  2/2010    biopsy neg Barretts, chronic reflux esophagitis s/p gastric bypass    UPPER GASTROINTESTINAL ENDOSCOPY  7/2006    unremarkable s/p gastric bypass    UPPER GASTROINTESTINAL ENDOSCOPY  8/10/15    Dr Claudette Bloch UPPER GASTROINTESTINAL ENDOSCOPY  4/1/16    Dr Alex Matthews GASTROINTESTINAL ENDOSCOPY N/A 4/1/2016    EGD ESOPHAGOGASTRODUODENOSCOPY performed by Shwetha Hastings MD at [address] Endoscopy    [address]       Family History   Problem Relation Age of Onset    Uterine Cancer Mother     Cervical Cancer Mother     Coronary Art Dis Mother     Heart Disease Father     Lung Cancer Father     Other Father         renal failure    Colon Cancer Brother     Colon Polyps Brother     Uterine Cancer Maternal Grandmother     Cervical Cancer Maternal Grandmother     Diabetes Maternal Grandmother     Cervical Cancer Sister     Other Sister         fibromyalgia    Diabetes Paternal Aunt     Esophageal Cancer Neg Hx     Liver Cancer Neg Hx     Liver Disease Neg Hx     Stomach Cancer Neg Hx     Rectal Cancer Neg Hx        CareTeam (Including outside providers/suppliers regularly involved in providing care):   Patient Care Team:  Lucía Le MD as PCP - General (Family Medicine)  Lucía Le MD as PCP - [facility]  Lc Bahena (General Surgery: [facility])  CHANELLE Waters as Nurse Practitioner (Family Nurse Practitioner)  CHANELLE Rae as Nurse Practitioner (Clinical Nurse Specialist Adult Health)    Wt Readings from Last 3 Encounters:   03/18/21 240 lb 3.2 oz (109 kg)   02/25/21 236 lb 1.6 oz (107.1 kg)   11/20/20 225 lb (102.1 kg)     Vitals:    03/18/21 0919   BP: 122/72   Pulse: 86   SpO2: 98%   Weight: 240 lb 3.2 oz (109 kg)   Height: 5' 7\" (1.702 m)           The following problems were reviewed today and where indicated follow up appointments were made and/or referrals ordered.     Risk Factor Screenings with Interventions     Fall Risk:  2 or more falls in past year?: (!) yes  Fall with injury in past year?: (!) yes  Fall Risk Interventions:    · Home safety tips provided    Depression:  PHQ-2 Score: 3  Depression Interventions:  · Relaxation techniques discussed    Anxiety:     Anxiety History   Administered Date(s) Administered    COVID-19, Moderna, PF, 100mcg/0.5mL 02/05/2021, 03/05/2021    Influenza Whole 10/12/2015    Influenza, High Dose (Fluzone 65 yrs and older) 09/15/2017, 10/12/2018    Influenza, Quadv, adjuvanted, 65 yrs +, IM, PF (Fluad) 10/22/2020    Influenza, Triv, inactivated, subunit, adjuvanted, IM (Fluad 65 yrs and older) 09/13/2019    Pneumococcal Conjugate 13-valent ([id number]) 01/31/2018    Pneumococcal Polysaccharide ([id number]) 02/22/2019    Tdap (Boostrix, Adacel) 03/07/2017, 08/17/2018    Zoster Live (Zostavax) 10/12/2015        Health Maintenance   Topic Date Due    Meningococcal (ACWY) vaccine (1 - Risk start before 7 months 4-dose series) Never done    Hib vaccine (1 of 1 - Risk 1-dose series) Never done    Meningococcal B vaccine (1 of 4 - Increased Risk Bexsero 2-dose series) Never done    Shingles Vaccine (2 of 3) 12/07/2015    Annual Wellness Visit (AWV)  Never done    Colon cancer screen colonoscopy  04/01/2021    Breast cancer screen  05/20/2021    A1C test (Diabetic or Prediabetic)  03/11/2022    TSH testing  03/11/2022    Potassium monitoring  03/11/2022    Creatinine monitoring  03/11/2022    DTaP/Tdap/Td vaccine (3 - Td) 08/17/2028    Flu vaccine  Completed    Pneumococcal 65+ yrs at Risk Vaccine  Completed    COVID-19 Vaccine  Completed    Hepatitis A vaccine  Aged Out       Recommendations for [address] Due: see orders.   Recommended screening schedule for the next 5-10 years is provided to the patient in written form: see Patient Instructions/AVS.

## 2021-03-18 NOTE — PATIENT INSTRUCTIONS
enzymes, such as Beano, can be added to gas-producing foods to prevent gas. ? Antacids, such as Maalox Anti-Gas and Mylanta Gas, can relieve bloating by making you burp. Be careful when you take over-the-counter antacid medicines. Many of these medicines have aspirin in them. Read the label to make sure that you are not taking more than the recommended dose. Too much aspirin can be harmful. ? Activated charcoal tablets, such as CharcoCaps, may decrease odor from gas you pass. ? If you have problems with lactose, you can take medicines such as Dairy Ease and Lactaid with dairy products to prevent gas and bloating. · Get some exercise regularly. When should you call for help? Call 911 anytime you think you may need emergency care. For example, call if:    · You have gas and signs of a heart attack, such as:  ? Chest pain or pressure. ? Sweating. ? Shortness of breath. ? Nausea or vomiting. ? Pain that spreads from the chest to the neck, jaw, or one or both shoulders or arms. ? Dizziness or lightheadedness. ? A fast or uneven pulse. After calling 911, chew 1 adult-strength aspirin. Wait for an ambulance. Do not try to drive yourself. Call your doctor now or seek immediate medical care if:    · You have severe belly pain.     · You have blood in your stool. Watch closely for changes in your health, and be sure to contact your doctor if:    · You have blood or pus in your urine.     · Your urine is cloudy or smells bad.     · You are burping and have trouble swallowing.     · You feel bloated and have swelling in your belly.     · You do not get better as expected. Where can you learn more? Go to [email or web address] and sign in to your [employer] account. Enter U026 in the [employer] box to learn more about \"Gas and Bloating: Care Instructions. \"     If you do not have an account, please click on the \"Sign Up Now\" link.   Current as of: February 26,

## 2021-03-19 ASSESSMENT — ENCOUNTER SYMPTOMS
DIARRHEA: 0
BLOOD IN STOOL: 0
ABDOMINAL PAIN: 0
CHEST TIGHTNESS: 0
SHORTNESS OF BREATH: 1
BACK PAIN: 1
ABDOMINAL DISTENTION: 0
SORE THROAT: 0
NAUSEA: 0
EYE ITCHING: 0
APNEA: 0
STRIDOR: 0
SINUS PAIN: 0
SINUS PRESSURE: 0
COUGH: 0
TROUBLE SWALLOWING: 0
VOMITING: 0
VOICE CHANGE: 0
WHEEZING: 0
COLOR CHANGE: 0
CONSTIPATION: 0
RHINORRHEA: 0
EYE DISCHARGE: 0

## 2021-03-22 ENCOUNTER — ANTI-COAG VISIT (OUTPATIENT)
Dept: INTERNAL MEDICINE | Age: 71
End: 2021-03-22
Payer: MEDICARE

## 2021-03-22 DIAGNOSIS — Z79.01 ANTICOAGULATED ON COUMADIN: ICD-10-CM

## 2021-03-22 LAB — INR BLD: 5.8

## 2021-03-22 PROCEDURE — 93793 ANTICOAG MGMT PT WARFARIN: CPT | Performed by: INTERNAL MEDICINE

## 2021-03-22 NOTE — PROGRESS NOTES
HOME MONITORING REPORT    INR today:   Results for orders placed or performed in visit on 03/22/21   Protime-INR   Result Value Ref Range    INR 5.80        INR Goal: 2.0-3.0    Dosing Plan  As of 3/22/2021    TTR:  20.4 % (2.8 y)   Full warfarin instructions:  7.5 mg every Wed; 15 mg all other days              PLAN: CALL RECEIVED ON Monday 3/22/21 FROM [facility] STATING THAT PATIENT HAD AN OUT OF RANGE INR ON Friday 3/19/21 OF 5.8. PATIENT WAS NOT CONTACTED BY PROVIDER'S OFFICE. She skipped her warfarin dose on  Friday, Saturday and Sunday. She will re-start tonight and re-check Tuesday 3/23/21. I have been unable to locate a printed result of this INR. IM staff states they have not see it. Patient/Caregiver voiced understanding      Electronically signed by Acacia Estrada MD on 3/22/2021 at 9:10 AM    I have reviewed nursing plan for Coumadin management and agree with plan.

## 2021-03-24 ENCOUNTER — ANTI-COAG VISIT (OUTPATIENT)
Dept: INTERNAL MEDICINE | Age: 71
End: 2021-03-24
Payer: MEDICARE

## 2021-03-24 DIAGNOSIS — Z79.01 ANTICOAGULATED ON COUMADIN: ICD-10-CM

## 2021-03-24 LAB — INR BLD: 3

## 2021-03-24 PROCEDURE — 93793 ANTICOAG MGMT PT WARFARIN: CPT | Performed by: INTERNAL MEDICINE

## 2021-03-24 NOTE — PROGRESS NOTES
HOME MONITORING REPORT    INR today:   Results for orders placed or performed in visit on 03/24/21   Protime-INR   Result Value Ref Range    INR 3.00        INR Goal: 2.0-3.0    Dosing Plan  As of 3/24/2021    TTR:  20.3 % (2.9 y)   Full warfarin instructions:  3/28: 7.5 mg; Otherwise 7.5 mg every Wed; 15 mg all other days              PLAN: Advised patient/caregiver to take 7.5 mg on Wednesday and Sunday. Until now she states she has been taking 15 mg every day. She will continue the 15 mg on the other five days and recheck in early next week. Patient/Caregiver voiced understanding    Electronically signed by Maribel Deng MD on 3/25/2021 at 5:27 AM    I have reviewed nursing plan for Coumadin management and agree with plan.

## 2021-04-01 ENCOUNTER — ANTI-COAG VISIT (OUTPATIENT)
Dept: INTERNAL MEDICINE | Age: 71
End: 2021-04-01
Payer: MEDICARE

## 2021-04-01 DIAGNOSIS — Z79.01 ANTICOAGULATED ON COUMADIN: ICD-10-CM

## 2021-04-01 LAB — INR BLD: 2.4

## 2021-04-01 PROCEDURE — 93793 ANTICOAG MGMT PT WARFARIN: CPT | Performed by: INTERNAL MEDICINE

## 2021-04-01 NOTE — PROGRESS NOTES
HOME MONITORING REPORT    INR today:   Results for orders placed or performed in visit on 04/01/21   Protime-INR   Result Value Ref Range    INR 2.40        INR Goal: 2.0-3.0    Dosing Plan  As of 4/1/2021    TTR:  20.7 % (2.9 y)   Full warfarin instructions:  7.5 mg every Wed; 15 mg all other days              PLAN: Advised patient/caregiver to continue current dose and recheck 4/1/21. Patient/Caregiver voiced understanding      Electronically signed by Gogo Soto MD on 4/1/2021 at 9:55 AM    I have reviewed nursing plan for Coumadin management and agree with plan.

## 2021-04-07 ENCOUNTER — OFFICE VISIT (OUTPATIENT)
Dept: GASTROENTEROLOGY | Age: 71
End: 2021-04-07
Payer: MEDICARE

## 2021-04-07 VITALS
HEART RATE: 84 BPM | HEIGHT: 67 IN | DIASTOLIC BLOOD PRESSURE: 86 MMHG | WEIGHT: 240.8 LBS | OXYGEN SATURATION: 98 % | SYSTOLIC BLOOD PRESSURE: 128 MMHG | BODY MASS INDEX: 37.79 KG/M2

## 2021-04-07 DIAGNOSIS — R11.2 NAUSEA AND VOMITING, INTRACTABILITY OF VOMITING NOT SPECIFIED, UNSPECIFIED VOMITING TYPE: ICD-10-CM

## 2021-04-07 DIAGNOSIS — R12 CHRONIC HEARTBURN: ICD-10-CM

## 2021-04-07 DIAGNOSIS — R10.30 LOWER ABDOMINAL PAIN: Primary | ICD-10-CM

## 2021-04-07 DIAGNOSIS — R19.7 DIARRHEA, UNSPECIFIED TYPE: ICD-10-CM

## 2021-04-07 DIAGNOSIS — Z98.84 S/P GASTRIC BYPASS: ICD-10-CM

## 2021-04-07 DIAGNOSIS — R19.5 LOOSE STOOLS: ICD-10-CM

## 2021-04-07 PROCEDURE — 1090F PRES/ABSN URINE INCON ASSESS: CPT | Performed by: NURSE PRACTITIONER

## 2021-04-07 PROCEDURE — 99215 OFFICE O/P EST HI 40 MIN: CPT | Performed by: NURSE PRACTITIONER

## 2021-04-07 PROCEDURE — 3017F COLORECTAL CA SCREEN DOC REV: CPT | Performed by: NURSE PRACTITIONER

## 2021-04-07 PROCEDURE — G8417 CALC BMI ABV UP PARAM F/U: HCPCS | Performed by: NURSE PRACTITIONER

## 2021-04-07 PROCEDURE — 1123F ACP DISCUSS/DSCN MKR DOCD: CPT | Performed by: NURSE PRACTITIONER

## 2021-04-07 PROCEDURE — G8427 DOCREV CUR MEDS BY ELIG CLIN: HCPCS | Performed by: NURSE PRACTITIONER

## 2021-04-07 PROCEDURE — G8399 PT W/DXA RESULTS DOCUMENT: HCPCS | Performed by: NURSE PRACTITIONER

## 2021-04-07 PROCEDURE — 1036F TOBACCO NON-USER: CPT | Performed by: NURSE PRACTITIONER

## 2021-04-07 PROCEDURE — 4040F PNEUMOC VAC/ADMIN/RCVD: CPT | Performed by: NURSE PRACTITIONER

## 2021-04-07 ASSESSMENT — ENCOUNTER SYMPTOMS
SORE THROAT: 0
VOICE CHANGE: 0
ANAL BLEEDING: 0
PHOTOPHOBIA: 0
SHORTNESS OF BREATH: 0
NAUSEA: 1
CONSTIPATION: 0
COUGH: 0
RECTAL PAIN: 0
ABDOMINAL DISTENTION: 0
BLOOD IN STOOL: 0
TROUBLE SWALLOWING: 0
ABDOMINAL PAIN: 1
DIARRHEA: 1
BACK PAIN: 1
VOMITING: 1

## 2021-04-07 NOTE — PROGRESS NOTES
([address]     Intestinal obstruction (HCC)     Iron deficiency     Left-sided weakness     Low vitamin D level     Lupus ([address]     Menopause     Obesity     Osteoarthritis     Osteoporosis     Other iron deficiency anemias     Palliative care patient 09/24/2020    Pernicious anemia     PUPP (pruritic urticarial papules and plaques of pregnancy)     Right leg numbness     Right sided sciatica     Sarcoidosis     with liver involvement    Sciatica     Secondary hyperparathyroidism ([address]     Shingles     Shortness of breath     Sleep apnea     Stomach ulcer     Syncope     Type II diabetes mellitus with nephropathy (HCC)     Visual loss, one eye           Past Surgical History:   Procedure Laterality Date    APPENDECTOMY      CARDIAC CATHETERIZATION  10/21/13  [facility]    EF over 60%    CHOLECYSTECTOMY      COLONOSCOPY  02/2010    negative    COLONOSCOPY  02/22/2010    Dr Kela Ganser    COLONOSCOPY  04/01/2016    Dr LAKHWINDER Horvath-internal hemorrhoids, 5 yr recall    EYE SURGERY      Cyst on Right eye    EYE SURGERY      GASTRIC BYPASS SURGERY      GASTRIC BYPASS SURGERY      HERNIA REPAIR      HYSTERECTOMY      Complete    HYSTERECTOMY      Partial - because had a tubal pregnancy.      INCONTINENCE SURGERY      Bladder Sling    OTHER SURGICAL HISTORY      IVC filter    PACEMAKER INSERTION      PACEMAKER PLACEMENT      med[employer]    [place] TOTAL KNEE ARTHROPLASTY Right 03/26/2018    TOTAL KNEE REPLACEMENT COMPLEX PRIMARY performed by Flo Nieves MD at [id number] [facility]      SMALL INTESTINE SURGERY      SPLENECTOMY      KRISTEL AND BSO      TONSILLECTOMY AND ADENOIDECTOMY      UPPER GASTROINTESTINAL ENDOSCOPY  12/2011    gerd s/p gastric bypass    UPPER GASTROINTESTINAL ENDOSCOPY  02/2014    normal s/p gastric bypass    UPPER GASTROINTESTINAL ENDOSCOPY  02/2010    biopsy neg Barretts, chronic reflux esophagitis s/p gastric bypass    UPPER GASTROINTESTINAL ENDOSCOPY  07/2006 unremarkable s/p gastric bypass    UPPER GASTROINTESTINAL ENDOSCOPY  08/10/2015    Dr Jorge Elizabeth ENDOSCOPY N/A 04/01/2016    Dr. Kristy Honeycutt:  H Pylori(-), HH, o/w normal    VENA CAVA FILTER PLACEMENT Right 2003       Social History     Socioeconomic History    Marital status:      Spouse name: None    Number of children: None    Years of education: None    Highest education level: None   Occupational History     Employer: [employer]    Social Needs    Financial resource strain: None    Food insecurity     Worry: None     Inability: None    Transportation needs     Medical: None     Non-medical: None   Tobacco Use    Smoking status: Never Smoker    Smokeless tobacco: Never Used   Substance and Sexual Activity    Alcohol use: No    Drug use: No    Sexual activity: Not Currently   Lifestyle    Physical activity     Days per week: None     Minutes per session: None    Stress: None   Relationships    Social connections     Talks on phone: None     Gets together: None     Attends Yarsanism service: None     Active member of club or organization: None     Attends meetings of clubs or organizations: None     Relationship status: None    Intimate partner violence     Fear of current or ex partner: None     Emotionally abused: None     Physically abused: None     Forced sexual activity: None   Other Topics Concern    None   Social History Narrative    None       Allergies   Allergen Reactions    Insect Extract Allergy Skin Test Anaphylaxis     Bee stings    Lactose Intolerance (Gi)     Prednisone      Headache and upset stomach    Zanaflex [Tizanidine Hcl]      Passed out lost control of body functions    Lortab [Hydrocodone-Acetaminophen] Nausea And Vomiting     Sweats, weak, nausea and vomiting    Other Nausea And Vomiting     Opiates------sweating, weak        Oxycodone-Acetaminophen Nausea And Vomiting     Sweating and vomiting     Ultram [Tramadol Hcl] Nausea And Vomiting     Sweating, weak, nausea and vomiting         Current Outpatient Medications   Medication Sig Dispense Refill    Cyanocobalamin 1000 MCG/ML KIT Inject as directed every 30 days      gabapentin (NEURONTIN) 100 MG capsule TAKE 1 CAPSULE BY MOUTH THREE TIMES DAILY 270 capsule 0    pantoprazole (PROTONIX) 40 MG tablet TAKE 1 TABLET BY MOUTH TWICE DAILY BEFORE MEAL(S) 30 tablet 0    ferrous gluconate (FERGON) 240 (27 Fe) MG tablet Take 1 tablet by mouth 2 times daily 180 tablet 2    calcitRIOL (ROCALTROL) 0.25 MCG capsule Take 0.25 mcg by mouth daily      bumetanide (BUMEX) 2 MG tablet Take 1 tablet by mouth daily 30 tablet 5    levothyroxine (SYNTHROID) 100 MCG tablet Take 1 tablet by mouth Daily 90 tablet 3    lisinopril (PRINIVIL;ZESTRIL) 40 MG tablet Take 1 tablet by mouth once daily 90 tablet 1    baclofen (LIORESAL) 10 MG tablet Take 1 tablet by mouth 3 times daily As needed for hip pain 10 tablet 1    escitalopram (LEXAPRO) 20 MG tablet Take 1 tablet by mouth daily 90 tablet 3    fexofenadine (ALLEGRA) 180 MG tablet Take 1 tablet by mouth daily Indications: Allergic Rhinitis 90 tablet 3    tiotropium (SPIRIVA RESPIMAT) 2.5 MCG/ACT AERS inhaler Inhale 2 puffs into the lungs daily 1 Inhaler 5    potassium chloride (KLOR-CON) 10 MEQ extended release tablet Take 10 mEq by mouth daily       warfarin (COUMADIN) 7.5 MG tablet Take 2 tablets by mouth daily 2 tablets daily, DO NOT RESUME UNTIL YOU RECHECK YOUR INR ON THURSDAY AND DISCUSS WITH PCP 60 tablet 0    calcipotriene (DOVONEX) 0.005 % cream Use topically as needed 3 Tube 3    clobetasol (TEMOVATE) 0.05 % cream Apply topically 2 times daily.  3 Tube 3    ondansetron (ZOFRAN) 4 MG tablet Take 1 tablet by mouth daily as needed for Nausea or Vomiting 30 tablet 0    Multiple Vitamins-Minerals (CENTRUM ADULTS) TABS Take 1 tablet by mouth daily      aspirin 81 MG tablet Take 81 mg by mouth daily       Current Facility-Administered Medications   Medication Dose Route Frequency Provider Last Rate Last Admin    cyanocobalamin injection 1,000 mcg  1,000 mcg Intramuscular Once Nora Thompson MD           Review of Systems   Constitutional: Negative for appetite change, fatigue, fever and unexpected weight change. HENT: Negative for sore throat, trouble swallowing and voice change. Eyes: Negative for photophobia and visual disturbance. Respiratory: Negative for cough and shortness of breath. Cardiovascular: Negative for chest pain, palpitations and leg swelling. Gastrointestinal: Positive for abdominal pain, diarrhea, nausea and vomiting. Negative for abdominal distention, anal bleeding, blood in stool, constipation and rectal pain. Genitourinary: Positive for frequency. Negative for hematuria. Musculoskeletal: Positive for arthralgias, back pain and neck pain. Allergic/Immunologic: Negative for immunocompromised state. Neurological: Negative for dizziness, syncope, weakness, light-headedness and headaches. Hematological: Negative for adenopathy. Does not bruise/bleed easily. Psychiatric/Behavioral: Positive for dysphoric mood. Negative for sleep disturbance. The patient is nervous/anxious. All other systems reviewed and are negative. Objective:     Physical Exam  Vitals signs and nursing note reviewed. Constitutional:       General: She is not in acute distress. Appearance: She is well-developed. Comments: /86   Pulse 84   Ht 5' 7\" (1.702 m)   Wt 240 lb 12.8 oz (109.2 kg)   SpO2 98%   BMI 37.71 kg/m²    HENT:      Head: Normocephalic and atraumatic. Right Ear: External ear normal.      Left Ear: External ear normal.   Eyes:      General: No scleral icterus. Conjunctiva/sclera: Conjunctivae normal.      Pupils: Pupils are equal, round, and reactive to light. Neck:      Musculoskeletal: Normal range of motion and neck supple. Thyroid: No thyromegaly.    Cardiovascular: Rate and Rhythm: Normal rate and regular rhythm. Heart sounds: Normal heart sounds. No murmur. No friction rub. No gallop. Comments: No edema noted to hayden lower extremities   Pulmonary:      Effort: Pulmonary effort is normal. No respiratory distress. Breath sounds: Normal breath sounds. Abdominal:      General: Bowel sounds are normal. There is no distension. Palpations: Abdomen is soft. Tenderness: There is abdominal tenderness (LLQ with palpation). There is no rebound. Comments: Hepatosplenomegaly not appreciated   Musculoskeletal: Normal range of motion. General: No deformity. Comments: Normal gait on exam   Neurological:      Mental Status: She is alert and oriented to person, place, and time. Cranial Nerves: No cranial nerve deficit. Psychiatric:         Judgment: Judgment normal.           Assessment:       Diagnosis Orders   1. Lower abdominal pain  COLONOSCOPY W/ OR W/O BIOPSY    CT ABDOMEN PELVIS W IV CONTRAST Additional Contrast? Oral   2. Loose stools  COLONOSCOPY W/ OR W/O BIOPSY    CT ABDOMEN PELVIS W IV CONTRAST Additional Contrast? Oral   3. Diarrhea, unspecified type  COLONOSCOPY W/ OR W/O BIOPSY   4. Chronic heartburn  ESOPHAGOSCOPY / EGD   5. Nausea and vomiting, intractability of vomiting not specified, unspecified vomiting type  ESOPHAGOSCOPY / EGD   6. S/P gastric bypass  ESOPHAGOSCOPY / EGD         Plan:      1. Did rectal swab in clinic today, will send to [facility] to r/o infectious etiology  2. CT abd/pelvis prior to scopes  3. Schedule outpatient endoscopy with clearance for coumadin- r/o h pylori please. Patient advised no Aspirin, Fish Oil, Vit E or NSAIDs 5 (five) days before procedure. Follow-up Visit: per Dr. Umesh Beth  Pt Education:   Risks, benefits, and alternatives to endoscopy were discussed. Patient voices understanding of risks of, but not limited to, perforation, bleeding, and infection.  The risk of perforation is increased with esophageal dilatation. All questions answered to patient's satisfaction. Patient is agreable to proceed. 4. Schedule outpatient colonoscopy with clearance for coumadin- need random colon bx. Patient advised no Aspirin, Fish Oil, Vit E or NSAIDs 5 (five) days before procedure. Follow-up Visit: per Dr Sarkis Aden  Pt education:  Risks, benefits, and alternatives to colonoscopy were discussed. Risks of colonoscopy include, but are not limited to, perforation, bleeding, and infection. We discussed that the risk for perforation is 1-3 in 5,000  at the time of colonoscopy;   and 1-2% risk of bleeding post-polypectomy. All questions answered to the satisfaction of the patient. Pt is agreeable to proceed.

## 2021-04-07 NOTE — PATIENT INSTRUCTIONS

## 2021-04-08 ENCOUNTER — ANTI-COAG VISIT (OUTPATIENT)
Dept: INTERNAL MEDICINE | Age: 71
End: 2021-04-08
Payer: MEDICARE

## 2021-04-08 DIAGNOSIS — Z79.01 ANTICOAGULATED ON COUMADIN: ICD-10-CM

## 2021-04-08 LAB — INR BLD: 2.8

## 2021-04-08 PROCEDURE — 93793 ANTICOAG MGMT PT WARFARIN: CPT | Performed by: INTERNAL MEDICINE

## 2021-04-08 NOTE — PROGRESS NOTES
HOME MONITORING REPORT    INR today:   Results for orders placed or performed in visit on 04/08/21   Protime-INR   Result Value Ref Range    INR 2.80        INR Goal: 2.0-3.0    Dosing Plan  As of 4/8/2021    TTR:  21.5 % (2.9 y)   Full warfarin instructions:  7.5 mg every Wed; 15 mg all other days              PLAN: Advised patient/caregiver to continue current dose and recheck in one week. Patient/Caregiver voiced understanding      Electronically signed by Isauro Carr MD on 4/8/2021 at 3:54 PM    I have reviewed nursing plan for Coumadin management and agree with plan.

## 2021-04-09 ENCOUNTER — TELEPHONE (OUTPATIENT)
Dept: GASTROENTEROLOGY | Age: 71
End: 2021-04-09

## 2021-04-12 ENCOUNTER — TELEPHONE (OUTPATIENT)
Dept: INTERNAL MEDICINE | Age: 71
End: 2021-04-12

## 2021-04-13 ENCOUNTER — TELEPHONE (OUTPATIENT)
Dept: GASTROENTEROLOGY | Age: 71
End: 2021-04-13

## 2021-04-13 ENCOUNTER — HOSPITAL ENCOUNTER (EMERGENCY)
Age: 71
Discharge: HOME | End: 2021-04-13
Attending: EMERGENCY MEDICINE
Payer: MEDICARE

## 2021-04-13 ENCOUNTER — HOSPITAL ENCOUNTER (OUTPATIENT)
Dept: CT IMAGING | Age: 71
Discharge: HOME | End: 2021-04-13
Payer: MEDICARE

## 2021-04-13 ENCOUNTER — APPOINTMENT (OUTPATIENT)
Dept: GENERAL RADIOLOGY | Age: 71
End: 2021-04-13
Payer: MEDICARE

## 2021-04-13 VITALS
DIASTOLIC BLOOD PRESSURE: 96 MMHG | WEIGHT: 240 LBS | OXYGEN SATURATION: 99 % | BODY MASS INDEX: 37.67 KG/M2 | HEIGHT: 67 IN | TEMPERATURE: 98.7 F | RESPIRATION RATE: 18 BRPM | HEART RATE: 82 BPM | SYSTOLIC BLOOD PRESSURE: 164 MMHG

## 2021-04-13 DIAGNOSIS — R10.30 LOWER ABDOMINAL PAIN: ICD-10-CM
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
## 2021-04-13 DIAGNOSIS — R19.5 LOOSE STOOLS: ICD-10-CM

## 2021-04-13 LAB
GFR AFRICAN AMERICAN: 33
GFR NON-AFRICAN AMERICAN: 28
PERFORMED ON: ABNORMAL
POC CREATININE: 1.8 MG/DL (ref 0.3–1.3)
POC SAMPLE TYPE: ABNORMAL

## 2021-04-13 PROCEDURE — 99282 EMERGENCY DEPT VISIT SF MDM: CPT

## 2021-04-13 PROCEDURE — 82565 ASSAY OF CREATININE: CPT

## 2021-04-13 PROCEDURE — 73090 X-RAY EXAM OF FOREARM: CPT

## 2021-04-13 PROCEDURE — 74176 CT ABD & PELVIS W/O CONTRAST: CPT

## 2021-04-13 RX ORDER — AMOXICILLIN AND CLAVULANATE POTASSIUM 875; 125 MG/1; MG/1
1 TABLET, FILM COATED ORAL 2 TIMES DAILY
Qty: 20 TABLET | Refills: 0 | Status: SHIPPED | OUTPATIENT
Start: 2021-04-13 | End: 2021-04-23

## 2021-04-13 ASSESSMENT — ENCOUNTER SYMPTOMS
SHORTNESS OF BREATH: 0
VOMITING: 0
NAUSEA: 0
DIARRHEA: 0
ABDOMINAL PAIN: 0

## 2021-04-13 NOTE — ED PROVIDER NOTES
[facility]  eMERGENCY dEPARTMENT eNCOUnter      Pt Name: Luz Marina Clement  MRN: [id number]  [place] 1950  Date of evaluation: 4/13/2021  Provider: Jesús Hough MD    [address] [place] [address]       Chief Complaint   Patient presents with    Animal Bite     R arm         HISTORY OF PRESENT ILLNESS   (Location/Symptom, Timing/Onset,Context/Setting, Quality, Duration, Modifying Factors, Severity)  Note limiting factors. Luz Marina Clement is a 69 y.o. female who presents to the emergency department after sustaining a dog bite to her right forearm yesterday morning. She states it was her pit bull who was tearing up her sheets and she \"popped them on the head with a can\". He then bit her. She states that he is a healthy dog and vaccinated. She is also up-to-date on her tetanus. She is on Coumadin but states she is had some mild oozing from the main wound. Last week her INR was 2.8 and she checks this at home. She also wrapped it but now comes in today for evaluation. HPI    NursingNotes were reviewed. REVIEW OF SYSTEMS    (2-9 systems for level 4, 10 or more for level 5)     Review of Systems   Constitutional: Negative for chills and fever. Respiratory: Negative for shortness of breath. Cardiovascular: Negative for chest pain. Gastrointestinal: Negative for abdominal pain, diarrhea, nausea and vomiting. Musculoskeletal: Negative for neck pain. Skin: Positive for wound. Neurological: Negative for dizziness and headaches. All other systems reviewed and are negative.            PAST MEDICALHISTORY     Past Medical History:   Diagnosis Date    Abdominal pain     Abnormal EKG     Acute sinusitis     Allergic reaction to spider bite     Anemia     Anticoagulated     Anxiety     Arrhythmia     Asthmatic bronchitis without complication 7/01/1265    Ataxic gait     Lyons's esophagus     Bowel obstruction (HCC)     Callus     Cardiac pacemaker     CKD (chronic kidney disease) stage 2, GFR 60-89 TOTAL KNEE REPLACEMENT COMPLEX PRIMARY performed by Scott Bledsoe MD at [facility]      SPLENECTOMY      KRISTEL AND BSO      TONSILLECTOMY AND ADENOIDECTOMY      UPPER GASTROINTESTINAL ENDOSCOPY  12/2011    gerd s/p gastric bypass    UPPER GASTROINTESTINAL ENDOSCOPY  02/2014    normal s/p gastric bypass    UPPER GASTROINTESTINAL ENDOSCOPY  02/2010    biopsy neg Barretts, chronic reflux esophagitis s/p gastric bypass    UPPER GASTROINTESTINAL ENDOSCOPY  07/2006    unremarkable s/p gastric bypass    UPPER GASTROINTESTINAL ENDOSCOPY  08/10/2015    Dr Jane López UPPER GASTROINTESTINAL ENDOSCOPY N/A 04/01/2016    Dr. Claudia Newton:  H Pylori(-), HH, o/w normal    VENA CAVA FILTER PLACEMENT Right 2003         CURRENT MEDICATIONS     Discharge Medication List as of 4/13/2021 11:42 AM      CONTINUE these medications which have NOT CHANGED    Details   enoxaparin (LOVENOX) 100 MG/ML injection 1ml BID, Disp-10 Syringe, R-0Normal      Cyanocobalamin 1000 MCG/ML KIT Inject as directed every 30 daysHistorical Med      gabapentin (NEURONTIN) 100 MG capsule TAKE 1 CAPSULE BY MOUTH THREE TIMES DAILY, Disp-270 capsule, R-0Normal      pantoprazole (PROTONIX) 40 MG tablet TAKE 1 TABLET BY MOUTH TWICE DAILY BEFORE MEAL(S), Disp-30 tablet, R-0Normal      ferrous gluconate (FERGON) 240 (27 Fe) MG tablet Take 1 tablet by mouth 2 times daily, Disp-180 tablet, R-2Normal      calcitRIOL (ROCALTROL) 0.25 MCG capsule Take 0.25 mcg by mouth dailyHistorical Med      bumetanide (BUMEX) 2 MG tablet Take 1 tablet by mouth daily, Disp-30 tablet, R-5Normal      levothyroxine (SYNTHROID) 100 MCG tablet Take 1 tablet by mouth Daily, Disp-90 tablet, R-3Normal      lisinopril (PRINIVIL;ZESTRIL) 40 MG tablet Take 1 tablet by mouth once daily, Disp-90 tablet, R-1Normal      baclofen (LIORESAL) 10 MG tablet Take 1 tablet by mouth 3 times daily As needed for hip pain, Disp-10 tablet, R-1Normal escitalopram (LEXAPRO) 20 MG tablet Take 1 tablet by mouth daily, Disp-90 tablet, R-3Normal      fexofenadine (ALLEGRA) 180 MG tablet Take 1 tablet by mouth daily Indications: Allergic Rhinitis, Disp-90 tablet, R-3Normal      tiotropium (SPIRIVA RESPIMAT) 2.5 MCG/ACT AERS inhaler Inhale 2 puffs into the lungs daily, Disp-1 Inhaler, R-5Normal      potassium chloride (KLOR-CON) 10 MEQ extended release tablet Take 10 mEq by mouth daily Historical Med      warfarin (COUMADIN) 7.5 MG tablet Take 2 tablets by mouth daily 2 tablets daily, DO NOT RESUME UNTIL YOU RECHECK YOUR INR ON THURSDAY AND DISCUSS WITH PCP, Disp-60 tablet, R-0Normal      calcipotriene (DOVONEX) 0.005 % cream Use topically as needed, Disp-3 Tube,R-3, Normal      clobetasol (TEMOVATE) 0.05 % cream Apply topically 2 times daily. , Disp-3 Tube,R-3, Normal      ondansetron (ZOFRAN) 4 MG tablet Take 1 tablet by mouth daily as needed for Nausea or Vomiting, Disp-30 tablet, R-0Normal      Multiple Vitamins-Minerals (CENTRUM ADULTS) TABS Take 1 tablet by mouth dailyHistorical Med      aspirin 81 MG tablet Take 81 mg by mouth dailyHistorical Med             ALLERGIES     Insect extract allergy skin test, Lactose intolerance (gi), Prednisone, Zanaflex [tizanidine hcl], Lortab [hydrocodone-acetaminophen], Other, Oxycodone-acetaminophen, and Ultram [tramadol hcl]    FAMILY HISTORY       Family History   Problem Relation Age of Onset    Uterine Cancer Mother     Cervical Cancer Mother     Coronary Art Dis Mother     Heart Disease Father     Lung Cancer Father     Other Father         renal failure    Colon Cancer Brother     Colon Polyps Brother     Uterine Cancer Maternal Grandmother     Cervical Cancer Maternal Grandmother     Diabetes Maternal Grandmother     Cervical Cancer Sister     Other Sister         fibromyalgia    Diabetes Paternal Aunt     Esophageal Cancer Neg Hx     Liver Cancer Neg Hx     Liver Disease Neg Hx     Stomach Cancer Neg Hx     Rectal Cancer Neg Hx           SOCIAL HISTORY       Social History     Socioeconomic History    Marital status:      Spouse name: None    Number of children: None    Years of education: None    Highest education level: None   Occupational History     Employer: [employer]    Social Needs    Financial resource strain: None    Food insecurity     Worry: None     Inability: None    Transportation needs     Medical: None     Non-medical: None   Tobacco Use    Smoking status: Never Smoker    Smokeless tobacco: Never Used   Substance and Sexual Activity    Alcohol use: No    Drug use: No    Sexual activity: Not Currently   Lifestyle    Physical activity     Days per week: None     Minutes per session: None    Stress: None   Relationships    Social connections     Talks on phone: None     Gets together: None     Attends Caodaism service: None     Active member of club or organization: None     Attends meetings of clubs or organizations: None     Relationship status: None    Intimate partner violence     Fear of current or ex partner: None     Emotionally abused: None     Physically abused: None     Forced sexual activity: None   Other Topics Concern    None   Social History Narrative    None       SCREENINGS             PHYSICAL EXAM    (up to 7 for level 4, 8 or more for level 5)     ED Triage Vitals [04/13/21 1049]   BP Temp Temp src Pulse Resp SpO2 Height Weight   (!) 186/101 98.7 °F (37.1 °C) -- 83 18 99 % 5' 7\" (1.702 m) 240 lb (108.9 kg)       Physical Exam  Vitals signs and nursing note reviewed. Constitutional:       General: She is not in acute distress. Appearance: Normal appearance. She is well-developed. She is not ill-appearing or diaphoretic. HENT:      Head: Normocephalic and atraumatic. Right Ear: External ear normal.      Left Ear: External ear normal.   Eyes:      Conjunctiva/sclera: Conjunctivae normal.   Neck:      Musculoskeletal: Normal range of motion. Trachea: No tracheal deviation. Cardiovascular:      Rate and Rhythm: Normal rate and regular rhythm. Pulses: Normal pulses. Heart sounds: Normal heart sounds. No murmur. Pulmonary:      Effort: No respiratory distress. Breath sounds: Normal breath sounds. No wheezing or rales. Abdominal:      Palpations: Abdomen is soft. There is no mass. Tenderness: There is no abdominal tenderness. Musculoskeletal: Normal range of motion. Right shoulder: Normal.      Right elbow: Normal.     Right wrist: Normal.        Arms:       Right hand: Normal.      Comments: Proximal dorsal forearm contusion/hematoma approx softball diameter, no erythema, has several tiny puncture marks but one main approx 1.5cm with minimal oozing, compartments soft   Skin:     General: Skin is warm and dry. Neurological:      Mental Status: She is alert and oriented to person, place, and time. GCS: GCS eye subscore is 4. GCS verbal subscore is 5. GCS motor subscore is 6. DIAGNOSTIC RESULTS       RADIOLOGY:  Non-plain film images such as CT, Ultrasound and MRI are read by the radiologist. Plain radiographic images are visualized and preliminarily interpreted bythe emergency physician with the below findings:          XR RADIUS ULNA RIGHT (2 VIEWS)   Final Result   1. The radius and ulna are intact. 2. Obvious soft tissue injury   Signed by Dr Joanne Wilkins on 4/13/2021 11:34 AM              LABS:  Labs Reviewed - No data to display    All other labs were within normal range or not returned as of this dictation.     EMERGENCY DEPARTMENT COURSE and DIFFERENTIAL DIAGNOSIS/MDM:   Vitals:    Vitals:    04/13/21 1049 04/13/21 1100   BP: (!) 186/101 (!) 164/96   Pulse: 83 82   Resp: 18    Temp: 98.7 °F (37.1 °C)    SpO2: 99%    Weight: 240 lb (108.9 kg)    Height: 5' 7\" (1.702 m)        MDM  Number of Diagnoses or Management Options     Amount and/or Complexity of Data Reviewed  Tests in the radiology section of CPT®: ordered and reviewed  Independent visualization of images, tracings, or specimens: yes      Dog bite yesterday, neg xray, hematoma on exam, doesn't appear infected currently, one wound slightly open but will not suture due to delay, wound check in 2 days with PCP, understands return precautions      CONSULTS:  None    PROCEDURES:  Unless otherwise noted below, none     Procedures    FINAL IMPRESSION      1.  Dog bite, initial encounter          DISPOSITION/PLAN   DISPOSITION Decision To Discharge 04/13/2021 11:41:26 AM      PATIENT REFERRED TO:  Shahrzad Gillespie MD  [address] [place] [address] Dr Devyn Bass [place] [address]  [phone]    Schedule an appointment as soon as possible for a visit in 2 days  For wound re-check      DISCHARGE MEDICATIONS:  Discharge Medication List as of 4/13/2021 11:42 AM      START taking these medications    Details   amoxicillin-clavulanate (AUGMENTIN) 875-125 MG per tablet Take 1 tablet by mouth 2 times daily for 10 days, Disp-20 tablet, R-0Normal                (Please note that portions of this note were completed with a voice recognition program.  Efforts were made to edit thedictations but occasionally words are mis-transcribed.)    Galina Ford MD (electronically signed)  Attending Emergency Physician        Aly Contreras MD  04/13/21 3604

## 2021-04-13 NOTE — TELEPHONE ENCOUNTER
Patient: Torrey Welch    YOB: 1950      Clearance for Endoscopy / Colonoscopy was received on April 13, 2021. Patient may discontinue the use of COUMADIN / ASA for 5  days prior to the procedure.     IS Lovenox required:  YES    PATIENT NOTIFIED ON:  4/13/21 (LEFT MESSAGE)

## 2021-04-14 ENCOUNTER — CARE COORDINATION (OUTPATIENT)
Dept: CARE COORDINATION | Age: 71
End: 2021-04-14

## 2021-04-14 ENCOUNTER — ANTI-COAG VISIT (OUTPATIENT)
Dept: INTERNAL MEDICINE | Age: 71
End: 2021-04-14
Payer: MEDICARE

## 2021-04-14 DIAGNOSIS — Z79.01 ANTICOAGULATED ON COUMADIN: ICD-10-CM

## 2021-04-14 LAB — INR BLD: 6.9

## 2021-04-14 PROCEDURE — 93793 ANTICOAG MGMT PT WARFARIN: CPT | Performed by: INTERNAL MEDICINE

## 2021-04-14 SDOH — SOCIAL STABILITY: SOCIAL NETWORK: HOW OFTEN DO YOU ATTEND CHURCH OR RELIGIOUS SERVICES?: MORE THAN 4 TIMES PER YEAR

## 2021-04-14 SDOH — ECONOMIC STABILITY: FOOD INSECURITY: WITHIN THE PAST 12 MONTHS, YOU WORRIED THAT YOUR FOOD WOULD RUN OUT BEFORE YOU GOT MONEY TO BUY MORE.: NEVER TRUE

## 2021-04-14 SDOH — SOCIAL STABILITY: SOCIAL NETWORK: ARE YOU MARRIED, WIDOWED, DIVORCED, SEPARATED, NEVER MARRIED, OR LIVING WITH A PARTNER?: DIVORCED

## 2021-04-14 SDOH — SOCIAL STABILITY: SOCIAL NETWORK: HOW OFTEN DO YOU ATTENT MEETINGS OF THE CLUB OR ORGANIZATION YOU BELONG TO?: MORE THAN 4 TIMES PER YEAR

## 2021-04-14 SDOH — HEALTH STABILITY: PHYSICAL HEALTH: ON AVERAGE, HOW MANY MINUTES DO YOU ENGAGE IN EXERCISE AT THIS LEVEL?: 0 MIN

## 2021-04-14 SDOH — ECONOMIC STABILITY: TRANSPORTATION INSECURITY
IN THE PAST 12 MONTHS, HAS THE LACK OF TRANSPORTATION KEPT YOU FROM MEDICAL APPOINTMENTS OR FROM GETTING MEDICATIONS?: NO

## 2021-04-14 SDOH — ECONOMIC STABILITY: FOOD INSECURITY: WITHIN THE PAST 12 MONTHS, THE FOOD YOU BOUGHT JUST DIDN'T LAST AND YOU DIDN'T HAVE MONEY TO GET MORE.: NEVER TRUE

## 2021-04-14 SDOH — SOCIAL STABILITY: SOCIAL NETWORK
DO YOU BELONG TO ANY CLUBS OR ORGANIZATIONS SUCH AS CHURCH GROUPS UNIONS, FRATERNAL OR ATHLETIC GROUPS, OR SCHOOL GROUPS?: YES

## 2021-04-14 NOTE — CARE COORDINATION
Ambulatory Care Coordination Note  4/14/2021  CM Risk Score: 10  Charlson 10 Year Mortality Risk Score: 100%     ACC: Konrad Eid RN    Summary Note: ACJUDAH spoke to Dina Josefelisa. She went to the ED yesterday due to dog bite to her right forearm on Monday. Arm still swollen and stated it is dark close to her hand. Closer to elbow it is dark. Pt is taking Coumadin 15 mg daily due to chronic anti-coagulation. She has an appointment to get her arm checked on 4/15. Pt is keeping her arm elevated on pillows. She said she is able to move her fingers without difficulty. Pt lives w her daughter and son-in-law. Granddaughter and niece also in home. Pt walks without ambulatory aid but said she does have balance trouble. She will use the wall for balance when needed. She does not typically get up to urinate during the night but does get up early some days for her job at [employer]. She works M-Th 4 hours daily and Friday she works 8 hours. Dtr drives pt most of the time but Dina Hernández said she may occasionally drive herself somewhere. Pt said she has help to pay for her Spiriva thru the . Pt denied any financial insecurity - she said the family help each other out. Tarastevenson Hernández has had her COVID vaccination and said she is thinking about returning to [employer] in person - she has been attending virtual services so far. While she denied any healthcare needs or barriers, Dina Hernández said she would like help with her tax return - Barry Alissa was preparing her taxes but said they are not doing that due to Daron. She also tried another agency on [facility] but doesn't think they are doing that either. Plan:  ACM enrolled patient for short term help to find . Encouraged pt to ask her adult daughter who she uses as well. Advised Dina Hernández she may call me at my work cell if needed. She voiced understanding.     Ambulatory Care Coordination Assessment    Care Coordination Protocol  Program Enrollment: Complex Care  Referral from Primary Care Provider: No  Week 1 - Initial Assessment     Do you have all of your prescriptions and are they filled?: Yes  Barriers to medication adherence: None  Are you able to afford your medications?: With Assistance  Medication Assistance Program: Patient assistance with pharmaceutical company  How often do you have trouble taking your medications the way you have been told to take them?: I always take them as prescribed. Do you have Home O2 Therapy?: Yes   Oxygen Regimen: At night/Sleep    CPAP Use: CPAP      Ability to seek help/take action for Emergent Urgent situations i.e. fire, crime, inclement weather or health crisis. : Independent  Ability to ambulate to restroom: Independent  Ability handle personal hygeine needs (bathing/dressing/grooming): Independent  Ability to manage Medications: Independent  Ability to prepare Food Preparation: Independent  Ability to maintain home (clean home, laundry): Independent  Ability to drive and/or has transportation: Needs Assistance  Ability to do shopping: Independent  Ability to manage finances:  Independent  Is patient able to live independently?: Yes     Current Housing: Private Residence        Per the Fall Risk Screening, did the patient have 2 or more falls or 1 fall with injury in the past year?: No     Frequent urination at night?: No  Do you use rails/bars?: No  Do you have a non-slip tub mat?: No     Are you experiencing loss of meaning?: No  Are you experiencing loss of hope and peace?: No     Thinking about your patient's physical health needs, are there any symptoms or problems (risk indicators) you are unsure about that require further investigation?: Mild vague physical symptoms or problems; but do not impact on daily life or are not of concern to patient   Are the patients physical health problems impacting on their mental well-being?: Mild impact on mental well-being e.g. \"\"feeling fed-up\"\", \"\"reduced enjoyment\"\"   Are there any problems with your patients lifestyle behaviors (alcohol, drugs, diet, exercise) that are impacting on physical or mental well-being?: Some mild concern of potential negative impact on well-being   Do you have any other concerns about your patients mental well-being? How would you rate their severity and impact on the patient?: No identified areas of concern   How would you rate their home environment in terms of safety and stability (including domestic violence, insecure housing, neighbor harassment)?: Consistently safe, supportive, stable, no identified problems   How do daily activities impact on the patient's well-being? (include current or anticipated unemployment, work, caregiving, access to transportation or other): No identified problems or perceived positive benefits   How would you rate their social network (family, work, friends)?: Good participation with social networks   How would you rate their financial resources (including ability to afford all required medical care)?: Financially secure, some resource challenges   How wells does the patient now understand their health and well-being (symptoms, signs or risk factors) and what they need to do to manage their health?: Reasonable to good understanding and already engages in managing health or is willing to undertake better management   How well do you think your patient can engage in healthcare discussions? (Barriers include language, deafness, aphasia, alcohol or drug problems, learning difficulties, concentration): Clear and open communication, no identified barriers   Do other services need to be involved to help this patient?: Other care/services not required at this time   Are current services involved with this patient well-coordinated? (Include coordination with other services you are now recommendation):  All required care/services in place and well-coordinated   Suggested Interventions and [employer]   Other Services or Interventions: 4/13: Pt

## 2021-04-14 NOTE — TELEPHONE ENCOUNTER
4-14-21   Pt has been notified of results and then the call was transferred to [facility] to schedule EGD No

## 2021-04-15 ENCOUNTER — OFFICE VISIT (OUTPATIENT)
Dept: INTERNAL MEDICINE | Age: 71
End: 2021-04-15
Payer: MEDICARE

## 2021-04-15 ENCOUNTER — CARE COORDINATION (OUTPATIENT)
Dept: CARE COORDINATION | Age: 71
End: 2021-04-15

## 2021-04-15 VITALS
SYSTOLIC BLOOD PRESSURE: 160 MMHG | WEIGHT: 240 LBS | BODY MASS INDEX: 37.67 KG/M2 | HEIGHT: 67 IN | DIASTOLIC BLOOD PRESSURE: 99 MMHG | HEART RATE: 74 BPM
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
## 2021-04-15 DIAGNOSIS — Z79.01 ANTICOAGULATED ON COUMADIN: ICD-10-CM

## 2021-04-15 PROCEDURE — G8417 CALC BMI ABV UP PARAM F/U: HCPCS | Performed by: NURSE PRACTITIONER

## 2021-04-15 PROCEDURE — 96372 THER/PROPH/DIAG INJ SC/IM: CPT | Performed by: NURSE PRACTITIONER

## 2021-04-15 PROCEDURE — 99213 OFFICE O/P EST LOW 20 MIN: CPT | Performed by: NURSE PRACTITIONER

## 2021-04-15 PROCEDURE — 1090F PRES/ABSN URINE INCON ASSESS: CPT | Performed by: NURSE PRACTITIONER

## 2021-04-15 PROCEDURE — 3017F COLORECTAL CA SCREEN DOC REV: CPT | Performed by: NURSE PRACTITIONER

## 2021-04-15 PROCEDURE — G8399 PT W/DXA RESULTS DOCUMENT: HCPCS | Performed by: NURSE PRACTITIONER

## 2021-04-15 PROCEDURE — G8427 DOCREV CUR MEDS BY ELIG CLIN: HCPCS | Performed by: NURSE PRACTITIONER

## 2021-04-15 PROCEDURE — 1111F DSCHRG MED/CURRENT MED MERGE: CPT | Performed by: NURSE PRACTITIONER

## 2021-04-15 PROCEDURE — 1036F TOBACCO NON-USER: CPT | Performed by: NURSE PRACTITIONER

## 2021-04-15 PROCEDURE — 4040F PNEUMOC VAC/ADMIN/RCVD: CPT | Performed by: NURSE PRACTITIONER

## 2021-04-15 PROCEDURE — 1123F ACP DISCUSS/DSCN MKR DOCD: CPT | Performed by: NURSE PRACTITIONER

## 2021-04-15 RX ORDER — CYANOCOBALAMIN 1000 UG/ML
1000 INJECTION INTRAMUSCULAR; SUBCUTANEOUS ONCE
Status: COMPLETED | OUTPATIENT
Start: 2021-04-15 | End: 2021-04-15

## 2021-04-15 RX ADMIN — CYANOCOBALAMIN 1000 MCG: 1000 INJECTION INTRAMUSCULAR; SUBCUTANEOUS at 10:17

## 2021-04-15 NOTE — CARE COORDINATION
Ambulatory Care Coordination Note  4/15/2021  CM Risk Score: 10  Charlson 10 Year Mortality Risk Score: 100%     ACC: Jesse Stanford, RN    Summary Note: AC spoke to Inez Gardner. She stated her arm remains swollen and bruised. She had f/u with her PCP and no changes were made to treatment plan. Pt continues to elevate arm, is using gauze to wrap mild oozing from wound. No signs of infection but was cautioned to call PCP if this occurs. Some discomfort but she is using Aleve for this. Pt verified with AC that she is using a CPAP nightly. Plan:  [facility] provided patient with phone number for [facility] Dept of [employer], explained that they will schedule an appt for her to assist with tax preparation: [phone]. Pt voiced understanding and took the phone number. Will f/u next week for status of bite wound healing of her arm. Care Coordination Interventions    Program Enrollment: Complex Care  Referral from Primary Care Provider: No  Suggested Interventions and Community Resources  Zone Management Tools: In Process (Comment: 4/15: Pt continues to keep rt arm elevated, had f/u w PCP and inst the swelling/bruising will slowly resolve. No s/s of infection presently.)  Other Services or Interventions: 4/15: Provided ph # for the [facility] for tax prep assistance: [phone]. Goals Addressed                 This Visit's Progress     Patient Stated (pt-stated)   On track     I need someone to do my taxes    Barriers: Unknown community resource options  Plan for overcoming my barriers:   Pt partnering with [facility] to find local resource to prepare her income tax return  Pt willing to ask daughter for agency information  Confidence: 9/10  Anticipated Goal Completion Date: 5/14/21            Prior to Admission medications    Medication Sig Start Date End Date Taking?  Authorizing Provider   CPAP Machine MISC Inhale 1 each into the lungs nightly   Yes Historical Provider, MD   amoxicillin-clavulanate (AUGMENTIN) 954-253 MG per tablet Take 1 tablet by mouth 2 times daily for 10 days 4/13/21 4/23/21  Alfonzo Ross MD   enoxaparin (LOVENOX) 100 MG/ML injection 1ml BID 4/12/21   Annalisa Su MD   Cyanocobalamin 1000 MCG/ML KIT Inject as directed every 30 days    Historical Provider, MD   gabapentin (NEURONTIN) 100 MG capsule TAKE 1 CAPSULE BY MOUTH THREE TIMES DAILY 3/18/21 6/16/21  Annalisa Su MD   pantoprazole (PROTONIX) 40 MG tablet TAKE 1 TABLET BY MOUTH TWICE DAILY BEFORE MEAL(S) 3/18/21   Annalisa Su MD   ferrous gluconate (FERGON) 240 (27 Fe) MG tablet Take 1 tablet by mouth 2 times daily 3/18/21   Annalisa Su MD   calcitRIOL (ROCALTROL) 0.25 MCG capsule Take 0.25 mcg by mouth daily    Marilin Ortega MD   bumetanide (BUMEX) 2 MG tablet Take 1 tablet by mouth daily 3/18/21   Annalisa Su MD   levothyroxine (SYNTHROID) 100 MCG tablet Take 1 tablet by mouth Daily 3/18/21   Annalisa Su MD   lisinopril (PRINIVIL;ZESTRIL) 40 MG tablet Take 1 tablet by mouth once daily 3/18/21   Annalisa Su MD   baclofen (LIORESAL) 10 MG tablet Take 1 tablet by mouth 3 times daily As needed for hip pain 3/18/21   Annalisa Su MD   escitalopram (LEXAPRO) 20 MG tablet Take 1 tablet by mouth daily 12/14/20   Annalisa Su MD   fexofenadine (ALLEGRA) 180 MG tablet Take 1 tablet by mouth daily Indications:  Allergic Rhinitis 12/14/20   Annalisa Su MD   tiotropium (SPIRIVA RESPIMAT) 2.5 MCG/ACT AERS inhaler Inhale 2 puffs into the lungs daily 11/23/20   Annalisa Su MD   potassium chloride (KLOR-CON) 10 MEQ extended release tablet Take 10 mEq by mouth daily  10/27/20   Historical Provider, MD   warfarin (COUMADIN) 7.5 MG tablet Take 2 tablets by mouth daily 2 tablets daily, DO NOT RESUME UNTIL YOU RECHECK YOUR INR ON THURSDAY AND DISCUSS WITH PCP 9/30/20 4/14/21  [facility], DO   calcipotriene (DOVONEX) 0.005 % cream Use topically as needed 7/7/20   Annalisa Su MD   clobetasol (TEMOVATE) 0.05 %

## 2021-04-15 NOTE — PROGRESS NOTES
Post-Discharge Transitional Care Management Services or Hospital Follow Up      Baldev Loly   YOB: 1950    Date of Office Visit:  4/15/2021  Date of Hospital Admission: 4/13/21  Date of Hospital Discharge: 4/13/21    Care management risk score Rising risk (score 2-5) and Complex Care (Scores >=6): 10     Non face to face  following discharge, date last encounter closed (first attempt may have been earlier): *No documented post hospital discharge outreach found in the last 14 days *No documented post hospital discharge outreach found in the last 14 days    Call initiated 2 business days of discharge: *No response recorded in the last 14 days    Patient Active Problem List   Diagnosis    Vomiting    Burping    GERD (gastroesophageal reflux disease)    History of gastric bypass    Family history of colon cancer    Bloating    Enuresis    Nausea and vomiting    Stable angina ([address]    Encounter for current long-term use of anticoagulants    Primary osteoarthritis of right knee    Arthritis of knee    Essential hypertension    Hyperglycemia    MER (obstructive sleep apnea)    Slow transit constipation    Iron deficiency anemia    Restrictive airway disease    DVT, lower extremity, proximal, acute, unspecified laterality ([address]    H/O systemic lupus erythematosus (SLE) ([address]    Anticoagulated on Coumadin    Burn of abdomen wall, second degree, initial encounter    Postmenopausal osteoporosis    Type II diabetes mellitus with nephropathy ([address]    Cellulitis of left lower extremity    Pacemaker    History of DVT (deep vein thrombosis)    Lupus anticoagulant disorder ([address]    History of pulmonary embolism    Thrombocytopenia ([address]    Hypothyroidism    Morbidly obese (HCC)    Asthmatic bronchitis without complication    Gastroenteritis    Colic    Abdominal pain    Intractable nausea and vomiting    Severe episode of recurrent major depressive disorder, without psychotic not look infected there is about 1/2 inch laceration that is open several other puncture marks.;  Nothing is draining. She has been using Dial soap at home  #2 chronic Coumadin therapy no much changes to accommodate for the antibiotic    Vitals:    04/15/21 0957   BP: (!) 160/99   Pulse: 74   Weight: 240 lb (108.9 kg)   Height: 5' 7\" (1.702 m)     Body mass index is 37.59 kg/m². Wt Readings from Last 3 Encounters:   04/15/21 240 lb (108.9 kg)   04/13/21 240 lb (108.9 kg)   04/07/21 240 lb 12.8 oz (109.2 kg)     BP Readings from Last 3 Encounters:   04/15/21 (!) 160/99   04/13/21 (!) 164/96   04/07/21 128/86       A comprehensive review of systems was negative except for what was noted in the HPI. Physical Exam:  General Appearance: alert and oriented to person, place and time, well developed and well- nourished, in no acute distress  Skin: warm and dry, no rash or erythema  Neck: supple and non-tender without mass, no thyromegaly or thyroid nodules, no cervical lymphadenopathy  Pulmonary/Chest: clear to auscultation bilaterally- no wheezes, rales or rhonchi, normal air movement, no respiratory distress  Cardiovascular: normal rate, regular rhythm, normal S1 and S2, no murmurs, rubs, clicks, or gallops, distal pulses intact, no carotid bruits  Abdomen: soft, non-tender, non-distended, normal bowel sounds, no masses or organomegaly  Extremities: no cyanosis, clubbing or edema  Musculoskeletal: normal range of motion, no joint swelling, deformity or tenderness  Neurologic: reflexes normal and symmetric, no cranial nerve deficit, gait, coordination and speech normal    Assessment/Plan:  1. Dog bite, subsequent encounter  Continue with Augmentin and get probiotics. #2 anticoagulated with Coumadin with history of DVT    Chronic Coumadin therapy your INR was 6.9 yesterday. Likely from the antibiotic.   She has not taken her Coumadin last night she will not take it tonight and she will restart one half dose  tomorrow night.  And Saturday. And sent to resume her usual dose and recheck next week. You should also get some probiotics on the way home to take with that antibiotic      Animal bite continue the antibiotic. We can recheck on Monday when you get your Coumadin checked if you think we need to.     Medical Decision Making: moderate complexity

## 2021-04-15 NOTE — PATIENT INSTRUCTIONS
Chronic Coumadin therapy your INR was 6.9 yesterday. Likely from the antibiotic. She has not taken her Coumadin last night she will not take it tonight and she will restart one half dose  tomorrow night. And Saturday. And sent to resume her usual dose and recheck next week. You should also get some probiotics on the way home to take with that antibiotic      Animal bite continue the antibiotic. We can recheck on Monday when you get your Coumadin checked if you think we need to.

## 2021-04-19 ENCOUNTER — ANTI-COAG VISIT (OUTPATIENT)
Dept: INTERNAL MEDICINE | Age: 71
End: 2021-04-19
Payer: MEDICARE

## 2021-04-19 DIAGNOSIS — Z79.01 ANTICOAGULATED ON COUMADIN: ICD-10-CM

## 2021-04-19 LAB — INR BLD: 3.1

## 2021-04-19 PROCEDURE — 93793 ANTICOAG MGMT PT WARFARIN: CPT | Performed by: INTERNAL MEDICINE

## 2021-04-19 NOTE — PROGRESS NOTES
HOME MONITORING REPORT    INR today:   Results for orders placed or performed in visit on 04/19/21   Protime-INR   Result Value Ref Range    INR 3.10        INR Goal: 2.0-3.0    Dosing Plan  As of 4/19/2021    TTR:  21.3 % (2.9 y)   Full warfarin instructions:  4/19: 7.5 mg; 4/23: 7.5 mg; Otherwise 7.5 mg every Wed; 15 mg all other days              PLAN: Advised patient/caregiver to decrease her dose to 7.5 mg on Monday, Wednesday anf Friday. 15 mg all other days and recheck in one week. Patient/Caregiver voiced understanding      Electronically signed by Miracle Recinos MD on 4/19/2021 at 2:07 PM    I have reviewed nursing plan for Coumadin management and agree with plan.

## 2021-04-26 ENCOUNTER — OFFICE VISIT (OUTPATIENT)
Dept: NEUROLOGY | Facility: CLINIC | Age: 71
End: 2021-04-26

## 2021-04-26 VITALS
HEART RATE: 72 BPM | BODY MASS INDEX: 38.3 KG/M2 | DIASTOLIC BLOOD PRESSURE: 110 MMHG | SYSTOLIC BLOOD PRESSURE: 140 MMHG | WEIGHT: 244 LBS | HEIGHT: 67 IN | RESPIRATION RATE: 18 BRPM

## 2021-04-26 DIAGNOSIS — G47.33 OBSTRUCTIVE SLEEP APNEA SYNDROME: Primary | ICD-10-CM

## 2021-04-26 PROCEDURE — 99213 OFFICE O/P EST LOW 20 MIN: CPT | Performed by: NURSE PRACTITIONER

## 2021-04-26 RX ORDER — FEXOFENADINE HCL 180 MG/1
180 TABLET ORAL DAILY
COMMUNITY

## 2021-04-26 RX ORDER — AMOXICILLIN AND CLAVULANATE POTASSIUM 875; 125 MG/1; MG/1
1 TABLET, FILM COATED ORAL 2 TIMES DAILY
COMMUNITY
End: 2022-02-07

## 2021-04-26 RX ORDER — GABAPENTIN 100 MG/1
100 CAPSULE ORAL 3 TIMES DAILY
COMMUNITY

## 2021-04-26 NOTE — PATIENT INSTRUCTIONS
Living With Sleep Apnea  Sleep apnea is a condition in which breathing pauses or becomes shallow during sleep. Sleep apnea is most commonly caused by a collapsed or blocked airway. People with sleep apnea snore loudly and have times when they gasp and stop breathing for 10 seconds or more during sleep. This happens over and over during the night. This disrupts your sleep and keeps your body from getting the rest that it needs, which can cause tiredness and lack of energy (fatigue) during the day.  The breaks in breathing also interrupt the deep sleep that you need to feel rested. Even if you do not completely wake up from the gaps in breathing, your sleep may not be restful. You may also have a headache in the morning and low energy during the day, and you may feel anxious or depressed.  How can sleep apnea affect me?  Sleep apnea increases your chances of extreme tiredness during the day (daytime fatigue). It can also increase your risk for health conditions, such as:  · Heart attack.  · Stroke.  · Diabetes.  · Heart failure.  · Irregular heartbeat.  · High blood pressure.  If you have daytime fatigue as a result of sleep apnea, you may be more likely to:  · Perform poorly at school or work.  · Fall asleep while driving.  · Have difficulty with attention.  · Develop depression or anxiety.  · Become severely overweight (obese).  · Have sexual dysfunction.  What actions can I take to manage sleep apnea?  Sleep apnea treatment    · If you were given a device to open your airway while you sleep, use it only as told by your health care provider. You may be given:  ? An oral appliance. This is a custom-made mouthpiece that shifts your lower jaw forward.  ? A continuous positive airway pressure (CPAP) device. This device blows air through a mask when you breathe out (exhale).  ? A nasal expiratory positive airway pressure (EPAP) device. This device has valves that you put into each nostril.  ? A bi-level positive airway  pressure (BPAP) device. This device blows air through a mask when you breathe in (inhale) and breathe out (exhale).  · You may need surgery if other treatments do not work for you.  Sleep habits  · Go to sleep and wake up at the same time every day. This helps set your internal clock (circadian rhythm) for sleeping.  ? If you stay up later than usual, such as on weekends, try to get up in the morning within 2 hours of your normal wake time.  · Try to get at least 7-9 hours of sleep each night.  · Stop computer, tablet, and mobile phone use a few hours before bedtime.  · Do not take long naps during the day. If you nap, limit it to 30 minutes.  · Have a relaxing bedtime routine. Reading or listening to music may relax you and help you sleep.  · Use your bedroom only for sleep.  ? Keep your television and computer out of your bedroom.  ? Keep your bedroom cool, dark, and quiet.  ? Use a supportive mattress and pillows.  · Follow your health care provider's instructions for other changes to sleep habits.  Nutrition  · Do not eat heavy meals in the evening.  · Do not have caffeine in the later part of the day. The effects of caffeine can last for more than 5 hours.  · Follow your health care provider's or dietitian's instructions for any diet changes.  Lifestyle         · Do not drink alcohol before bedtime. Alcohol can cause you to fall asleep at first, but then it can cause you to wake up in the middle of the night and have trouble getting back to sleep.  · Do not use any products that contain nicotine or tobacco, such as cigarettes and e-cigarettes. If you need help quitting, ask your health care provider.  Medicines  · Take over-the-counter and prescription medicines only as told by your health care provider.  · Do not use over-the-counter sleep medicine. You can become dependent on this medicine, and it can make sleep apnea worse.  · Do not use medicines, such as sedatives and narcotics, unless told by your health  care provider.  Activity  · Exercise on most days, but avoid exercising in the evening. Exercising near bedtime can interfere with sleeping.  · If possible, spend time outside every day. Natural light helps regulate your circadian rhythm.  General information  · Lose weight if you need to, and maintain a healthy weight.  · Keep all follow-up visits as told by your health care provider. This is important.  · If you are having surgery, make sure to tell your health care provider that you have sleep apnea. You may need to bring your device with you.  Where to find more information  Learn more about sleep apnea and daytime fatigue from:  · American Sleep Association: sleepassociation.org  · National Sleep Foundation: sleepfoundation.org  · National Heart, Lung, and Blood Prosser: nhlbi.nih.gov  Summary  · Sleep apnea can cause daytime fatigue and other serious health conditions.  · Both sleep apnea and daytime fatigue can be bad for your health and well-being.  · You may need to wear a device while sleeping to help keep your airway open.  · If you are having surgery, make sure to tell your health care provider that you have sleep apnea. You may need to bring your device with you.  · Making changes to sleep habits, diet, lifestyle, and activity can help you manage sleep apnea.  This information is not intended to replace advice given to you by your health care provider. Make sure you discuss any questions you have with your health care provider.  Document Revised: 04/10/2020 Document Reviewed: 03/14/2019  [employer] Patient Education © 2021 [employer]

## 2021-04-26 NOTE — PROGRESS NOTES
Neurology Progress Note      Chief Complaint:    Obstructive sleep apnea    Subjective     Subjective:  Veronica Stewart is a 70-year-old female who presents today for annual evaluation of obstructive sleep apnea.  She was last seen 3/13/2020 with Antoine Acuna PA-C.  She presents today stating that she had Covid back in October 2020\and that she has not been using her BiPAP machine since.  She states that she simply gets home from work, eats her dinner, and just falls asleep without remarried but her BiPAP mask on.  She states that she will wake up at some point in the night and remember that she has not put her mask on and then think it is too late to put on.  She presents today with complaints of extreme fatigue, waking up feeling unrested, snoring, reports of apnea at night, and multiple nightly awakenings.  The only compliance report available is a 365-day report related to her not using her mask.      Past Medical History:   Diagnosis Date   • Anxiety    • Arrhythmia    • Blood clot in vein 05/2018    Hospitalized    • Arben-tachy syndrome (CMS/HCC)    • Bradycardia    • Breath shortness    • Burn     left leg   • Cardiac pacemaker     11/09 [facility]   • Chest pain    • Chronic fatigue    • Depression    • Diabetes mellitus (CMS/HCC)    • Dizziness    • Fatigue    • Follow-up exam    • Heart burn    • Hypercoagulable state, primary (CMS/HCC)     LUPUS ANTI-COAG   • Hyperlipidemia    • Hypertension    • Liver disease    • Shortness of breath    • Sleep apnea     complex.  using a c-pap machine   • Stroke (CMS/HCC)    • Syncope    • Tachy-arben syndrome (CMS/HCC)      Past Surgical History:   Procedure Laterality Date   • APPENDECTOMY     • CARDIAC ELECTROPHYSIOLOGY PROCEDURE N/A 2/26/2020    Procedure: PPM generator change - dual;  Surgeon: Ronny Lowe MD;  Location: [facility] INVASIVE LOCATION;  Service: Cardiology;  Laterality: N/A;   • CATARACT EXTRACTION     • CHOLECYSTECTOMY     • HERNIA REPAIR     •  HYSTERECTOMY     • INSERT / REPLACE / REMOVE PACEMAKER     • OOPHORECTOMY     • PACEMAKER IMPLANTATION     • REPLACEMENT TOTAL KNEE Right 03/26/2018    Dr doherty    • TRAM-EN-Y PROCEDURE  2002    Dr Dahiana [facility]   • SPLENECTOMY       Family History   Problem Relation Age of Onset   • Coronary artery disease Mother    • Hyperlipidemia Mother    • Anemia Mother    • Cervical cancer Mother    • Kidney failure Father    • Heart failure Father    • Fibromyalgia Sister    • Anemia Sister    • Clotting disorder Sister    • Alcohol abuse Brother    • Cancer Maternal Grandmother    • Diabetes Maternal Grandmother    • Heart failure Maternal Grandfather    • No Known Problems Paternal Grandmother    • No Known Problems Paternal Grandfather    • Colon cancer Brother      Social History     Tobacco Use   • Smoking status: Never Smoker   • Smokeless tobacco: Never Used   Substance Use Topics   • Alcohol use: Never   • Drug use: Never     Medications:  Current Outpatient Medications   Medication Sig Dispense Refill   • amoxicillin-clavulanate (AUGMENTIN) 875-125 MG per tablet Take 1 tablet by mouth 2 (Two) Times a Day.     • aspirin 81 MG EC tablet daily.     • baclofen (LIORESAL) 10 MG tablet TAKE 1 TABLET BY MOUTH THREE TIMES DAILY AS NEEDED FOR HIP PAIN     • bumetanide (BUMEX) 1 MG tablet Take 2 mg by mouth Daily.     • calcipotriene (DOVONEX) 0.005 % cream Apply  topically to the appropriate area as directed As Needed.     • clobetasol (TEMOVATE) 0.05 % cream Apply  topically 2 (Two) Times a Day.     • CloNIDine (CATAPRES) 0.1 MG tablet Take 0.1 mg by mouth 2 (Two) Times a Day As Needed.     • cyanocobalamin 1000 MCG/ML injection Inject 1,000 mcg into the shoulder, thigh, or buttocks Every 28 (Twenty-Eight) Days.     • Diclofenac Sodium 2 % solution Place 1 applicator on the skin as directed by provider 2 (Two) Times a Day.     • escitalopram (LEXAPRO) 20 MG tablet Take 1 tablet by mouth Daily. 30 tablet 2   •  Ferrous Gluconate 239 (27 Fe) MG tablet Take 1 tablet by mouth 3 (Three) Times a Day.     • fexofenadine (ALLEGRA) 180 MG tablet Take 180 mg by mouth Daily.     • gabapentin (NEURONTIN) 100 MG capsule Take 100 mg by mouth 3 (Three) Times a Day.     • levothyroxine (SYNTHROID, LEVOTHROID) 100 MCG tablet Take 100 mcg by mouth Daily.     • lisinopril (PRINIVIL,ZESTRIL) 40 MG tablet Take 40 mg by mouth Daily.     • Multiple Vitamins-Minerals (MULTIVITAMIN WITH MINERALS) tablet tablet Take 1 tablet by mouth Daily.     • ondansetron (ZOFRAN) 4 MG tablet Take 4 mg by mouth Daily As Needed for Nausea or Vomiting.     • pantoprazole (PROTONIX) 40 MG EC tablet Take 40 mg by mouth Daily.     • pregabalin (LYRICA) 75 MG capsule Take 75 mg by mouth 3 (Three) Times a Day.     • tiotropium (SPIRIVA HANDIHALER) 18 MCG per inhalation capsule Place 18 mcg into inhaler and inhale Daily.     • warfarin (COUMADIN) 7.5 MG tablet Take 7.5 mg by mouth. Take 7.5mg  2 tabs po daily     • polyethylene glycol (MIRALAX) packet Take 17 g by mouth As Needed.     • vitamin D (ERGOCALCIFEROL) 1.25 MG (58170 UT) capsule capsule Take 50,000 Units by mouth 2 (Two) Times a Week.       No current facility-administered medications for this visit.     Current outpatient and discharge medications have been reconciled for the patient.  Reviewed by: CATRACHITO Mcdonnell      Allergies:    Bee venom, Insect extract allergy skin test, Percocet [oxycodone-acetaminophen], Prednisone, Tizanidine hcl, Ultram [tramadol hcl], Hydrocodone-acetaminophen, and Other    Review of Systems:   Review of Systems   Psychiatric/Behavioral: Positive for sleep disturbance.   All other systems reviewed and are negative.        Objective      Vital Signs  Heart Rate:  [72] 72  Resp:  [18] 18  BP: (140)/(110) 140/110    Physical Exam:  Physical Exam  Constitutional:       Appearance: Normal appearance.   HENT:      Head: Normocephalic.   Eyes:      Extraocular Movements:  Extraocular movements intact.      Pupils: Pupils are equal, round, and reactive to light.   Cardiovascular:      Rate and Rhythm: Normal rate and regular rhythm.      Pulses: Normal pulses.   Pulmonary:      Effort: Pulmonary effort is normal.   Musculoskeletal:         General: Normal range of motion.      Cervical back: Normal range of motion and neck supple.   Skin:     General: Skin is warm and dry.      Capillary Refill: Capillary refill takes less than 2 seconds.   Neurological:      General: No focal deficit present.      Mental Status: She is alert and oriented to person, place, and time. Mental status is at baseline.      Cranial Nerves: Cranial nerves are intact.      Sensory: Sensation is intact.      Motor: Motor function is intact.      Coordination: Coordination is intact.      Gait: Gait is intact.      Deep Tendon Reflexes: Reflexes are normal and symmetric.   Psychiatric:         Mood and Affect: Mood normal.         Neck Circumference: 13.25  Mallampati Classification: 2       Results Review:      [place] Sleepiness Scale: 19    STOP-BANG: High risk FRANCIE    Compliance Report:     Report available only for 365-days.  Report dates were for 4/27/2020-4/26/2021.  Patient used the device for 4 out of 365 days for 1% compliance.  3 of those days were used for greater than 4 hours for 1% compliance.  Average use was 6 hours 27 minutes.  Settings are IPAP of 20 cm H2O EPAP 10 cm H2O.  Respiratory rate is set at 12 bpm.  Median leak is 16.8 L/min.  AHI 8.8 for the 4 days used.  However this is very likely not completely accurate due to using her device for 4 days..    Chart Review:  Historical neurology note reviewed for interval history of FRANCIE and compliance.  It is noted that she has had quite the issue with hospitalizations in the past as well as other medical history issues.  This has affected her compliance with her BiPAP machine.    Assessment/Plan     Impression:  1. Obstructive sleep  apnea  2. Non-compliance with CPAP  3. BMI 38.2      Plan:  1. I have reviewed the current compliance download and findings of the previous polysomnography with the patient in detail.  The patient voices understanding and recognizes the need for adherence to the prescribed therapy.  She understands that a certain level of compliance allows for continued insurance coverage as well as adequate treatment of underlying apnea.  She also understands the impact this has upon their overall health status and other medical comorbidities.  2. I have recommended regular cardiovascular exercise in the form of walking, biking or swimming 30-40 minutes at a time at least 3-4 times per week.  3. Counseled on multimodal approach to treatment of sleep apnea to include but not limited to diet, exercise, sleep hygiene, compliance with pap therapy.   4. Encouraged lateral sleeping position and to avoid sedatives or alcohol close to bedtime.   5. Risks of untreated sleep apnea were discussed to include but not limited to HTN, heart disease, stroke, cardiac arrhythmia such as AFIB, and dementia.  6. Patient presents today with multiple sleep complaints the worst being extreme fatigue and feeling unrested in the morning.  Advised patient to continue to attempt to use her BiPAP machine as it is very likely the major contributing factor to her symptoms.  No other work-up will be needed until she is compliant with her BiPAP machine.  7. Patient states that her mask is inappropriately fitting her face.  I sent a miscellaneous DME order for mask fitting for her to receive the mask which is appropriate and will benefit her sleep apnea most effectively.  8. I told patient to contact me with any concerns or new complaints in the interim.  9. Return in about 6 months (around 10/26/2021) for Obstructive sleep apnea follow-up.  10. The plan of care was fully discussed with the patient and they are in full agreement at this time.         Caleb GASPAR  CATRACHITO Baldwin  04/26/21  09:18 CDT

## 2021-04-28 ENCOUNTER — CARE COORDINATION (OUTPATIENT)
Dept: CARE COORDINATION | Age: 71
End: 2021-04-28

## 2021-04-29 ENCOUNTER — HOSPITAL ENCOUNTER (OUTPATIENT)
Dept: WOUND CARE | Age: 71
Discharge: HOME | DRG: 580 | End: 2021-04-29
Payer: MEDICARE

## 2021-04-29 ENCOUNTER — ANTI-COAG VISIT (OUTPATIENT)
Dept: INTERNAL MEDICINE | Age: 71
End: 2021-04-29
Payer: MEDICARE

## 2021-04-29 ENCOUNTER — HOSPITAL ENCOUNTER (INPATIENT)
Age: 71
LOS: 2 days | Discharge: HOME | DRG: 580 | End: 2021-05-01
Attending: EMERGENCY MEDICINE | Admitting: HOSPITALIST
Payer: MEDICARE

## 2021-04-29 ENCOUNTER — OFFICE VISIT (OUTPATIENT)
Dept: INTERNAL MEDICINE | Age: 71
End: 2021-04-29
Payer: MEDICARE

## 2021-04-29 ENCOUNTER — APPOINTMENT (OUTPATIENT)
Dept: GENERAL RADIOLOGY | Age: 71
DRG: 580 | End: 2021-04-29
Payer: MEDICARE

## 2021-04-29 VITALS
DIASTOLIC BLOOD PRESSURE: 83 MMHG | SYSTOLIC BLOOD PRESSURE: 146 MMHG | WEIGHT: 244 LBS | RESPIRATION RATE: 18 BRPM | TEMPERATURE: 97.5 F | BODY MASS INDEX: 38.3 KG/M2 | HEIGHT: 67 IN | HEART RATE: 62 BPM

## 2021-04-29 VITALS — TEMPERATURE: 98.4 F | HEART RATE: 68 BPM | SYSTOLIC BLOOD PRESSURE: 170 MMHG | DIASTOLIC BLOOD PRESSURE: 94 MMHG

## 2021-04-29 DIAGNOSIS — L02.413 ABSCESS OF RIGHT ARM: ICD-10-CM

## 2021-04-29 DIAGNOSIS — L08.9 WOUND INFECTION: Primary | ICD-10-CM

## 2021-04-29 DIAGNOSIS — L03.113 CELLULITIS OF RIGHT UPPER EXTREMITY: ICD-10-CM

## 2021-04-29 DIAGNOSIS — T14.8XXA WOUND INFECTION: Primary | ICD-10-CM

## 2021-04-29 DIAGNOSIS — E11.21 TYPE II DIABETES MELLITUS WITH NEPHROPATHY (HCC): ICD-10-CM

## 2021-04-29 DIAGNOSIS — E66.01 MORBIDLY OBESE (HCC): ICD-10-CM

## 2021-04-29 DIAGNOSIS — Z79.01 ANTICOAGULATED ON COUMADIN: ICD-10-CM
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
## 2021-04-29 DIAGNOSIS — Z95.0 PACEMAKER: ICD-10-CM

## 2021-04-29 DIAGNOSIS — M32.9 H/O SYSTEMIC LUPUS ERYTHEMATOSUS (SLE) (HCC): ICD-10-CM
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
## 2021-04-29 PROBLEM — L03.116 CELLULITIS OF LEFT LOWER EXTREMITY: Status: RESOLVED | Noted: 2019-08-21 | Resolved: 2021-04-29

## 2021-04-29 LAB
ALBUMIN SERPL-MCNC: 3.7 G/DL (ref 3.5–5.2)
ALP BLD-CCNC: 88 U/L (ref 35–104)
ALT SERPL-CCNC: 11 U/L (ref 5–33)
ANION GAP SERPL CALCULATED.3IONS-SCNC: 9 MMOL/L (ref 7–19)
APTT: 44.8 SEC (ref 26–36.2)
AST SERPL-CCNC: 24 U/L (ref 5–32)
BASOPHILS ABSOLUTE: 0 K/UL (ref 0–0.2)
BASOPHILS RELATIVE PERCENT: 0.3 % (ref 0–1)
BILIRUB SERPL-MCNC: 1 MG/DL (ref 0.2–1.2)
BUN BLDV-MCNC: 14 MG/DL (ref 8–23)
C-REACTIVE PROTEIN: 5.92 MG/DL (ref 0–0.5)
CALCIUM SERPL-MCNC: 8.8 MG/DL (ref 8.8–10.2)
CHLORIDE BLD-SCNC: 107 MMOL/L (ref 98–111)
CO2: 24 MMOL/L (ref 22–29)
CREAT SERPL-MCNC: 1.1 MG/DL (ref 0.5–0.9)
EOSINOPHILS ABSOLUTE: 0.1 K/UL (ref 0–0.6)
EOSINOPHILS RELATIVE PERCENT: 0.5 % (ref 0–5)
GFR AFRICAN AMERICAN: >59
GFR NON-AFRICAN AMERICAN: 49
GLUCOSE BLD-MCNC: 84 MG/DL (ref 70–99)
GLUCOSE BLD-MCNC: 86 MG/DL (ref 74–109)
HCT VFR BLD CALC: 31.3 % (ref 37–47)
HEMOGLOBIN: 9.8 G/DL (ref 12–16)
IMMATURE GRANULOCYTES #: 0.1 K/UL
INR BLD: 2.77 (ref 0.88–1.18)
INR BLD: 3.8
LACTIC ACID: 1 MMOL/L (ref 0.5–1.9)
LYMPHOCYTES ABSOLUTE: 2.2 K/UL (ref 1.1–4.5)
LYMPHOCYTES RELATIVE PERCENT: 19.7 % (ref 20–40)
MCH RBC QN AUTO: 29.3 PG (ref 27–31)
MCHC RBC AUTO-ENTMCNC: 31.3 G/DL (ref 33–37)
MCV RBC AUTO: 93.7 FL (ref 81–99)
MONOCYTES ABSOLUTE: 1.2 K/UL (ref 0–0.9)
MONOCYTES RELATIVE PERCENT: 10.6 % (ref 0–10)
NEUTROPHILS ABSOLUTE: 7.5 K/UL (ref 1.5–7.5)
NEUTROPHILS RELATIVE PERCENT: 68.3 % (ref 50–65)
PDW BLD-RTO: 16.1 % (ref 11.5–14.5)
PERFORMED ON: NORMAL
PLATELET # BLD: 180 K/UL (ref 130–400)
PMV BLD AUTO: 11.7 FL (ref 9.4–12.3)
POTASSIUM SERPL-SCNC: 4.1 MMOL/L (ref 3.5–5)
PROTHROMBIN TIME: 30 SEC (ref 12–14.6)
RBC # BLD: 3.34 M/UL (ref 4.2–5.4)
SARS-COV-2, NAAT: NOT DETECTED
SEDIMENTATION RATE, ERYTHROCYTE: 53 MM/HR (ref 0–25)
SODIUM BLD-SCNC: 140 MMOL/L (ref 136–145)
TOTAL PROTEIN: 7.9 G/DL (ref 6.6–8.7)
WBC # BLD: 10.9 K/UL (ref 4.8–10.8)

## 2021-04-29 PROCEDURE — 6360000002 HC RX W HCPCS: Performed by: HOSPITALIST

## 2021-04-29 PROCEDURE — G8417 CALC BMI ABV UP PARAM F/U: HCPCS | Performed by: INTERNAL MEDICINE

## 2021-04-29 PROCEDURE — 1210000000 HC MED SURG R&B

## 2021-04-29 PROCEDURE — 2580000003 HC RX 258: Performed by: EMERGENCY MEDICINE

## 2021-04-29 PROCEDURE — 10060 I&D ABSCESS SIMPLE/SINGLE: CPT

## 2021-04-29 PROCEDURE — 96365 THER/PROPH/DIAG IV INF INIT: CPT

## 2021-04-29 PROCEDURE — 99213 OFFICE O/P EST LOW 20 MIN: CPT | Performed by: NURSE PRACTITIONER

## 2021-04-29 PROCEDURE — 85652 RBC SED RATE AUTOMATED: CPT

## 2021-04-29 PROCEDURE — 3017F COLORECTAL CA SCREEN DOC REV: CPT | Performed by: INTERNAL MEDICINE

## 2021-04-29 PROCEDURE — 10060 I&D ABSCESS SIMPLE/SINGLE: CPT | Performed by: NURSE PRACTITIONER

## 2021-04-29 PROCEDURE — 85610 PROTHROMBIN TIME: CPT

## 2021-04-29 PROCEDURE — 87040 BLOOD CULTURE FOR BACTERIA: CPT

## 2021-04-29 PROCEDURE — 1123F ACP DISCUSS/DSCN MKR DOCD: CPT | Performed by: INTERNAL MEDICINE

## 2021-04-29 PROCEDURE — 82947 ASSAY GLUCOSE BLOOD QUANT: CPT

## 2021-04-29 PROCEDURE — 80053 COMPREHEN METABOLIC PANEL: CPT

## 2021-04-29 PROCEDURE — 85730 THROMBOPLASTIN TIME PARTIAL: CPT

## 2021-04-29 PROCEDURE — 94640 AIRWAY INHALATION TREATMENT: CPT

## 2021-04-29 PROCEDURE — 0J9J0ZZ DRAINAGE OF RIGHT HAND SUBCUTANEOUS TISSUE AND FASCIA, OPEN APPROACH: ICD-10-PCS | Performed by: HOSPITALIST

## 2021-04-29 PROCEDURE — 6360000002 HC RX W HCPCS: Performed by: EMERGENCY MEDICINE

## 2021-04-29 PROCEDURE — 2580000003 HC RX 258: Performed by: HOSPITALIST

## 2021-04-29 PROCEDURE — 99213 OFFICE O/P EST LOW 20 MIN: CPT

## 2021-04-29 PROCEDURE — 83605 ASSAY OF LACTIC ACID: CPT

## 2021-04-29 PROCEDURE — 36415 COLL VENOUS BLD VENIPUNCTURE: CPT

## 2021-04-29 PROCEDURE — 99222 1ST HOSP IP/OBS MODERATE 55: CPT | Performed by: SURGERY

## 2021-04-29 PROCEDURE — 85025 COMPLETE CBC W/AUTO DIFF WBC: CPT

## 2021-04-29 PROCEDURE — 99214 OFFICE O/P EST MOD 30 MIN: CPT | Performed by: INTERNAL MEDICINE

## 2021-04-29 PROCEDURE — G8427 DOCREV CUR MEDS BY ELIG CLIN: HCPCS | Performed by: INTERNAL MEDICINE

## 2021-04-29 PROCEDURE — 1090F PRES/ABSN URINE INCON ASSESS: CPT | Performed by: INTERNAL MEDICINE

## 2021-04-29 PROCEDURE — 86140 C-REACTIVE PROTEIN: CPT

## 2021-04-29 PROCEDURE — 73090 X-RAY EXAM OF FOREARM: CPT

## 2021-04-29 PROCEDURE — 99283 EMERGENCY DEPT VISIT LOW MDM: CPT

## 2021-04-29 PROCEDURE — G8399 PT W/DXA RESULTS DOCUMENT: HCPCS | Performed by: INTERNAL MEDICINE

## 2021-04-29 PROCEDURE — 87186 SC STD MICRODIL/AGAR DIL: CPT

## 2021-04-29 PROCEDURE — 87205 SMEAR GRAM STAIN: CPT

## 2021-04-29 PROCEDURE — 87070 CULTURE OTHR SPECIMN AEROBIC: CPT

## 2021-04-29 PROCEDURE — 93793 ANTICOAG MGMT PT WARFARIN: CPT | Performed by: INTERNAL MEDICINE

## 2021-04-29 PROCEDURE — 4040F PNEUMOC VAC/ADMIN/RCVD: CPT | Performed by: INTERNAL MEDICINE

## 2021-04-29 PROCEDURE — 1036F TOBACCO NON-USER: CPT | Performed by: INTERNAL MEDICINE

## 2021-04-29 PROCEDURE — 6370000000 HC RX 637 (ALT 250 FOR IP): Performed by: HOSPITALIST

## 2021-04-29 PROCEDURE — 87635 SARS-COV-2 COVID-19 AMP PRB: CPT

## 2021-04-29 PROCEDURE — 96375 TX/PRO/DX INJ NEW DRUG ADDON: CPT

## 2021-04-29 PROCEDURE — 86403 PARTICLE AGGLUT ANTBDY SCRN: CPT

## 2021-04-29 RX ORDER — ACETAMINOPHEN 650 MG/1
650 SUPPOSITORY RECTAL EVERY 6 HOURS PRN
Status: DISCONTINUED | OUTPATIENT
Start: 2021-04-29 | End: 2021-05-01 | Stop reason: HOSPADM

## 2021-04-29 RX ORDER — DOXYCYCLINE HYCLATE 100 MG/1
100 CAPSULE ORAL 2 TIMES DAILY
Qty: 20 CAPSULE | Refills: 0 | Status: ON HOLD | OUTPATIENT
Start: 2021-04-29 | End: 2021-05-01 | Stop reason: HOSPADM

## 2021-04-29 RX ORDER — LIDOCAINE HYDROCHLORIDE 10 MG/ML
10 INJECTION, SOLUTION EPIDURAL; INFILTRATION; INTRACAUDAL; PERINEURAL ONCE
Status: DISCONTINUED | OUTPATIENT
Start: 2021-04-29 | End: 2021-04-30 | Stop reason: HOSPADM

## 2021-04-29 RX ORDER — ESCITALOPRAM OXALATE 10 MG/1
20 TABLET ORAL DAILY
Status: DISCONTINUED | OUTPATIENT
Start: 2021-04-29 | End: 2021-05-01 | Stop reason: HOSPADM

## 2021-04-29 RX ORDER — PROMETHAZINE HYDROCHLORIDE 12.5 MG/1
12.5 TABLET ORAL EVERY 6 HOURS PRN
Status: DISCONTINUED | OUTPATIENT
Start: 2021-04-29 | End: 2021-05-01 | Stop reason: HOSPADM

## 2021-04-29 RX ORDER — SODIUM HYPOCHLORITE 1.25 MG/ML
SOLUTION TOPICAL
Qty: 1000 ML | Refills: 2 | Status: SHIPPED | OUTPATIENT
Start: 2021-04-29 | End: 2021-07-01 | Stop reason: ALTCHOICE

## 2021-04-29 RX ORDER — LEVOTHYROXINE SODIUM 0.05 MG/1
100 TABLET ORAL DAILY
Status: DISCONTINUED | OUTPATIENT
Start: 2021-04-29 | End: 2021-05-01 | Stop reason: HOSPADM

## 2021-04-29 RX ORDER — CETIRIZINE HYDROCHLORIDE 10 MG/1
10 TABLET ORAL DAILY
Status: DISCONTINUED | OUTPATIENT
Start: 2021-04-29 | End: 2021-05-01 | Stop reason: HOSPADM

## 2021-04-29 RX ORDER — POTASSIUM CHLORIDE 20 MEQ/1
40 TABLET, EXTENDED RELEASE ORAL PRN
Status: DISCONTINUED | OUTPATIENT
Start: 2021-04-29 | End: 2021-05-01 | Stop reason: HOSPADM

## 2021-04-29 RX ORDER — ACETAMINOPHEN 325 MG/1
650 TABLET ORAL EVERY 6 HOURS PRN
Status: DISCONTINUED | OUTPATIENT
Start: 2021-04-29 | End: 2021-05-01 | Stop reason: HOSPADM

## 2021-04-29 RX ORDER — BACLOFEN 10 MG/1
10 TABLET ORAL 3 TIMES DAILY
Status: DISCONTINUED | OUTPATIENT
Start: 2021-04-29 | End: 2021-05-01 | Stop reason: HOSPADM

## 2021-04-29 RX ORDER — HYDROCODONE BITARTRATE AND ACETAMINOPHEN 5; 325 MG/1; MG/1
1 TABLET ORAL EVERY 4 HOURS PRN
Status: DISCONTINUED | OUTPATIENT
Start: 2021-04-29 | End: 2021-04-29

## 2021-04-29 RX ORDER — MORPHINE SULFATE 4 MG/ML
4 INJECTION, SOLUTION INTRAMUSCULAR; INTRAVENOUS ONCE
Status: COMPLETED | OUTPATIENT
Start: 2021-04-29 | End: 2021-04-29

## 2021-04-29 RX ORDER — ONDANSETRON 2 MG/ML
4 INJECTION INTRAMUSCULAR; INTRAVENOUS EVERY 6 HOURS PRN
Status: DISCONTINUED | OUTPATIENT
Start: 2021-04-29 | End: 2021-05-01 | Stop reason: HOSPADM

## 2021-04-29 RX ORDER — CALCITRIOL 0.25 UG/1
0.25 CAPSULE, LIQUID FILLED ORAL DAILY
Status: DISCONTINUED | OUTPATIENT
Start: 2021-04-29 | End: 2021-05-01 | Stop reason: HOSPADM

## 2021-04-29 RX ORDER — SODIUM CHLORIDE 9 MG/ML
INJECTION, SOLUTION INTRAVENOUS CONTINUOUS
Status: DISCONTINUED | OUTPATIENT
Start: 2021-04-29 | End: 2021-05-01 | Stop reason: HOSPADM

## 2021-04-29 RX ORDER — SODIUM CHLORIDE 0.9 % (FLUSH) 0.9 %
5-40 SYRINGE (ML) INJECTION PRN
Status: DISCONTINUED | OUTPATIENT
Start: 2021-04-29 | End: 2021-05-01 | Stop reason: HOSPADM

## 2021-04-29 RX ORDER — POLYETHYLENE GLYCOL 3350 17 G/17G
17 POWDER, FOR SOLUTION ORAL DAILY PRN
Status: DISCONTINUED | OUTPATIENT
Start: 2021-04-29 | End: 2021-05-01 | Stop reason: HOSPADM

## 2021-04-29 RX ORDER — MULTIVITAMIN WITH IRON
1 TABLET ORAL DAILY
Status: DISCONTINUED | OUTPATIENT
Start: 2021-04-29 | End: 2021-05-01 | Stop reason: HOSPADM

## 2021-04-29 RX ORDER — FERROUS GLUCONATE 324(37.5)
324 TABLET ORAL 2 TIMES DAILY
Status: DISCONTINUED | OUTPATIENT
Start: 2021-04-29 | End: 2021-05-01 | Stop reason: HOSPADM

## 2021-04-29 RX ORDER — SODIUM CHLORIDE 9 MG/ML
25 INJECTION, SOLUTION INTRAVENOUS PRN
Status: DISCONTINUED | OUTPATIENT
Start: 2021-04-29 | End: 2021-05-01 | Stop reason: HOSPADM

## 2021-04-29 RX ORDER — SODIUM CHLORIDE 0.9 % (FLUSH) 0.9 %
5-40 SYRINGE (ML) INJECTION EVERY 12 HOURS SCHEDULED
Status: DISCONTINUED | OUTPATIENT
Start: 2021-04-29 | End: 2021-05-01 | Stop reason: HOSPADM

## 2021-04-29 RX ORDER — GABAPENTIN 100 MG/1
100 CAPSULE ORAL 3 TIMES DAILY
Status: DISCONTINUED | OUTPATIENT
Start: 2021-04-29 | End: 2021-05-01 | Stop reason: HOSPADM

## 2021-04-29 RX ORDER — MAGNESIUM SULFATE IN WATER 40 MG/ML
2000 INJECTION, SOLUTION INTRAVENOUS PRN
Status: DISCONTINUED | OUTPATIENT
Start: 2021-04-29 | End: 2021-05-01 | Stop reason: HOSPADM

## 2021-04-29 RX ORDER — PANTOPRAZOLE SODIUM 40 MG/1
40 TABLET, DELAYED RELEASE ORAL
Status: DISCONTINUED | OUTPATIENT
Start: 2021-04-30 | End: 2021-05-01 | Stop reason: HOSPADM

## 2021-04-29 RX ORDER — POTASSIUM CHLORIDE 7.45 MG/ML
10 INJECTION INTRAVENOUS PRN
Status: DISCONTINUED | OUTPATIENT
Start: 2021-04-29 | End: 2021-05-01 | Stop reason: HOSPADM

## 2021-04-29 RX ORDER — LISINOPRIL 20 MG/1
40 TABLET ORAL DAILY
Status: DISCONTINUED | OUTPATIENT
Start: 2021-04-29 | End: 2021-05-01 | Stop reason: HOSPADM

## 2021-04-29 RX ADMIN — THERA TABS 1 TABLET: TAB at 19:51

## 2021-04-29 RX ADMIN — CALCITRIOL CAPSULES 0.25 MCG 0.25 MCG: 0.25 CAPSULE ORAL at 19:50

## 2021-04-29 RX ADMIN — IPRATROPIUM BROMIDE 0.5 MG: 0.5 SOLUTION RESPIRATORY (INHALATION) at 18:57

## 2021-04-29 RX ADMIN — SODIUM CHLORIDE: 9 INJECTION, SOLUTION INTRAVENOUS at 18:22

## 2021-04-29 RX ADMIN — PIPERACILLIN AND TAZOBACTAM 3375 MG: 3; .375 INJECTION, POWDER, LYOPHILIZED, FOR SOLUTION INTRAVENOUS at 19:50

## 2021-04-29 RX ADMIN — LISINOPRIL 40 MG: 20 TABLET ORAL at 19:51

## 2021-04-29 RX ADMIN — VANCOMYCIN HYDROCHLORIDE 2000 MG: 1 INJECTION, POWDER, LYOPHILIZED, FOR SOLUTION INTRAVENOUS at 15:40

## 2021-04-29 RX ADMIN — CETIRIZINE HYDROCHLORIDE 10 MG: 10 TABLET, FILM COATED ORAL at 19:50

## 2021-04-29 RX ADMIN — MORPHINE SULFATE 4 MG: 4 INJECTION, SOLUTION INTRAMUSCULAR; INTRAVENOUS at 13:32

## 2021-04-29 RX ADMIN — FERROUS GLUCONATE TAB 324 MG (37.5 MG ELEMENTAL IRON) 324 MG: 324 (37.5 FE) TAB at 19:50

## 2021-04-29 RX ADMIN — LEVOTHYROXINE SODIUM 100 MCG: 50 TABLET ORAL at 19:50

## 2021-04-29 RX ADMIN — PIPERACILLIN AND TAZOBACTAM 3375 MG: 3; .375 INJECTION, POWDER, LYOPHILIZED, FOR SOLUTION INTRAVENOUS at 14:01

## 2021-04-29 RX ADMIN — BACLOFEN 10 MG: 10 TABLET ORAL at 19:50

## 2021-04-29 RX ADMIN — GABAPENTIN 100 MG: 100 CAPSULE ORAL at 19:50

## 2021-04-29 RX ADMIN — ESCITALOPRAM OXALATE 20 MG: 10 TABLET ORAL at 19:50

## 2021-04-29 ASSESSMENT — ENCOUNTER SYMPTOMS
SORE THROAT: 0
COLOR CHANGE: 1
RHINORRHEA: 0
VOMITING: 0
DIARRHEA: 0
COLOR CHANGE: 1
NAUSEA: 0
ABDOMINAL PAIN: 0
BACK PAIN: 0
SHORTNESS OF BREATH: 0

## 2021-04-29 ASSESSMENT — PAIN SCALES - GENERAL
PAINLEVEL_OUTOF10: 8

## 2021-04-29 ASSESSMENT — PAIN DESCRIPTION - PAIN TYPE: TYPE: ACUTE PAIN

## 2021-04-29 ASSESSMENT — PAIN DESCRIPTION - LOCATION: LOCATION: ARM

## 2021-04-29 ASSESSMENT — VISUAL ACUITY: OU: 1

## 2021-04-29 ASSESSMENT — PAIN DESCRIPTION - PROGRESSION: CLINICAL_PROGRESSION: GRADUALLY WORSENING

## 2021-04-29 ASSESSMENT — PAIN DESCRIPTION - FREQUENCY: FREQUENCY: CONTINUOUS

## 2021-04-29 ASSESSMENT — PAIN DESCRIPTION - DESCRIPTORS: DESCRIPTORS: ACHING

## 2021-04-29 NOTE — PROGRESS NOTES
4 Eyes Skin Assessment    Pamela Arnett is being assessed upon: Admission    I agree that I, García Padilla, along with Domingo Gant (either 2 RN's or 1 LPN and 1 RN) have performed a thorough Head to Toe Skin Assessment on the patient. ALL assessment sites listed below have been assessed. Areas assessed by both nurses:     [x]   Head, Face, and Ears   [x]   Shoulders, Back, and Chest  [x]   Arms, Elbows, and Hands   [x]   Coccyx, Sacrum, and Ischium  [x]   Legs, Feet, and Heels    Does the Patient have Skin Breakdown?  No    Prudencio Prevention initiated: NA  Wound Care Orders initiated:no    [id number]  nurse consulted for Pressure Injury (Stage 3,4, Unstageable, DTI, NWPT, and Complex wounds) and New or Established Ostomies: No        Primary Nurse eSignature: García Padilla RN on 4/29/2021 at 6:09 PM      Co-Signer eSignature: Electronically signed by Alma aFjardo RN on 4/29/21 at 6:12 PM CDT

## 2021-04-29 NOTE — PLAN OF CARE
Problem: Pain:  Description: Pain management should include both nonpharmacologic and pharmacologic interventions.   Goal: Pain level will decrease  Description: Pain level will decrease  Outcome: Ongoing     Problem: Wound:  Goal: Signs of wound healing will improve  Description: Signs of wound healing will improve  Outcome: Ongoing     Problem: Blood Glucose:  Goal: Ability to maintain appropriate glucose levels will improve  Description: Ability to maintain appropriate glucose levels will improve  Outcome: Ongoing

## 2021-04-29 NOTE — PROGRESS NOTES
Pharmacy Note  Vancomycin Consult    Zoya Ocampo is a 69 y.o. female started on Vancomycin for skin and soft tissue infection; consult received from Dr. Candelaria Roe to manage therapy. Also receiving the following antibiotics: Zosyn X1 in ED. Active Problems:    Soft tissue infection  Resolved Problems:    * No resolved hospital problems. *      Allergies:  Insect extract allergy skin test, Lactose intolerance (gi), Prednisone, Zanaflex [tizanidine hcl], Lortab [hydrocodone-acetaminophen], Other, Oxycodone-acetaminophen, and Ultram [tramadol hcl]     Temp max: 97.6    Recent Labs     04/29/21  1325   BUN 14       Recent Labs     04/29/21  1325   CREATININE 1.1*       Recent Labs     04/29/21  1325   WBC 10.9*       No intake or output data in the 24 hours ending 04/29/21 1432    Culture Date Source Results   04/29/21 Blood X2 sent   04/29/21 Wound sent            Ht Readings from Last 1 Encounters:   04/29/21 5' 7\" (1.702 m)        Wt Readings from Last 1 Encounters:   04/29/21 244 lb (110.7 kg)         Body mass index is 38.22 kg/m². Estimated Creatinine Clearance: 60 mL/min (A) (based on SCr of 1.1 mg/dL (H)). Assessment/Plan:  Will initiate vancomycin 2000 mg IV loading dose X1 followed by Vancomycin 1500mg IV q24hr. Timing of trough level will be determined based on culture results, renal function, and clinical response. Thank you for the consult. Will continue to follow.     Electronically signed by Rafal Awad [facility] on 4/29/2021 at 2:32 PM

## 2021-04-29 NOTE — H&P
HISTORY AND PHYSICAL             Date: 4/29/2021        Patient Name: Mariluz Quinonez     YOB: 1950      Age:  69 y.o. Chief Complaint     Chief Complaint   Patient presents with    Arm Swelling     R arm, dog bite weeks ago, sent by wound care        History Obtained From   patient    History of Present Illness     50-year-old lady with a complicated past medical history, presented to the hospital from wound care with worsening right forearm swelling and pain post dog bite. Patient stated on April 12 she was bit by her pitbull dog, she presented to the emergency room the following day on day 13 for evaluation was given antibiotics and discharged home to follow-up outpatient with PCP. Patient stated on the 14th she saw her PCP, was told to continue antibiotics and was referred to wound care clinic. Patient stated she followed up as instructed with a wound care clinic and subsequently was having some worsening pain in her right lower extremity with associated fevers at home. Couple days back started having some drainage from the site, went to the wound care clinic today and wound was packed and sent to the emergency room. In the ED was given vancomycin and Zosyn and admitted for further evaluation.       Past Medical History     Past Medical History:   Diagnosis Date    Abdominal pain     Abnormal EKG     Acute sinusitis     Allergic reaction to spider bite     Anemia     Anticoagulated     Anxiety     Arrhythmia     Asthmatic bronchitis without complication 0/22/5485    Ataxic gait     Lyons's esophagus     Bowel obstruction (HCC)     Callus     Cardiac pacemaker     CKD (chronic kidney disease) stage 2, GFR 60-89 ml/min     Coat's syndrome     Coat's syndrome     both eyes    Depression     Diabetes mellitus type 2 in nonobese (HCC)     Diabetic nephropathy (HCC)     Disequilibrium     Dizziness     D[facility] thrombosis) [facility]     Exudative retinopathy     Fibromyalgia     Fibromyositis     Gastric ulcer     GERD (gastroesophageal reflux disease)     History of gastric bypass     Hx of lupus anticoagulant disorder     Hypertension     Hypothyroidism     Intermittent claudication (HCC)     Intestinal obstruction (HCC)     Iron deficiency     Left-sided weakness     Low vitamin D level     Lupus (HCC)     Menopause     Obesity     Osteoarthritis     Osteoporosis     Other iron deficiency anemias     Palliative care patient 09/24/2020    Pernicious anemia     PUPP (pruritic urticarial papules and plaques of pregnancy)     Right leg numbness     Right sided sciatica     Sarcoidosis     with liver involvement    Sciatica     Secondary hyperparathyroidism ([address]     Shingles     Shortness of breath     Sleep apnea     Stomach ulcer     Syncope     Visual loss, one eye         Past Surgical History     Past Surgical History:   Procedure Laterality Date    APPENDECTOMY      CARDIAC CATHETERIZATION  10/21/13  [facility]    EF over 60%    CHOLECYSTECTOMY      COLONOSCOPY  02/2010    negative    COLONOSCOPY  02/22/2010    Dr José Luis Olmedo    COLONOSCOPY  04/01/2016    Dr LAKHWINDER Horvath-internal hemorrhoids, 5 yr recall    DILATATION, ESOPHAGUS      EYE SURGERY      Cyst on Right eye    EYE SURGERY      FRACTURE SURGERY      totsl knee replacement    GASTRIC BYPASS SURGERY      GASTRIC BYPASS SURGERY      HERNIA REPAIR      HYSTERECTOMY      Complete    HYSTERECTOMY      Partial - because had a tubal pregnancy.      INCONTINENCE SURGERY      Bladder Sling    OTHER SURGICAL HISTORY      IVC filter    PACEMAKER INSERTION      PACEMAKER PLACEMENT      medtronic    SD TOTAL KNEE ARTHROPLASTY Right 03/26/2018    TOTAL KNEE REPLACEMENT COMPLEX PRIMARY performed by Rena Fenton MD at [address] [place] [address]      SPLENECTOMY      KRISTEL AND BSO      TONSILLECTOMY AND ADENOIDECTOMY      UPPER GASTROINTESTINAL ENDOSCOPY  12/2011    gerd s/p gastric bypass    UPPER GASTROINTESTINAL ENDOSCOPY  02/2014    normal s/p gastric bypass    UPPER GASTROINTESTINAL ENDOSCOPY  02/2010    biopsy neg Barretts, chronic reflux esophagitis s/p gastric bypass    UPPER GASTROINTESTINAL ENDOSCOPY  07/2006    unremarkable s/p gastric bypass    UPPER GASTROINTESTINAL ENDOSCOPY  08/10/2015    Dr Blaine Obando UPPER GASTROINTESTINAL ENDOSCOPY N/A 04/01/2016    Dr. Nino Rojas:  H Pylori(-), HH, o/w normal    VENA CAVA FILTER PLACEMENT Right 2003        Medications Prior to Admission     Prior to Admission medications    Medication Sig Start Date End Date Taking?  Authorizing Provider   doxycycline hyclate (VIBRAMYCIN) 100 MG capsule Take 1 capsule by mouth 2 times daily for 10 days 4/29/21 5/9/21  Jimmy Reed MD   sodium hypochlorite (DAKINS) 0.125 % SOLN external solution Moisten gauze apply to wound twice daily 4/29/21   Eddie Brushton, APRN - CNP   CPAP Machine MISC Inhale 1 each into the lungs nightly    Historical Provider, MD   enoxaparin (LOVENOX) 100 MG/ML injection 1ml BID 4/12/21   Rasta Miguel MD   Cyanocobalamin 1000 MCG/ML KIT Inject as directed every 30 days    Historical Provider, MD   gabapentin (NEURONTIN) 100 MG capsule TAKE 1 CAPSULE BY MOUTH THREE TIMES DAILY 3/18/21 6/16/21  Rasta Miguel MD   pantoprazole (PROTONIX) 40 MG tablet TAKE 1 TABLET BY MOUTH TWICE DAILY BEFORE MEAL(S) 3/18/21   Rasta Miguel MD   ferrous gluconate (FERGON) 240 (27 Fe) MG tablet Take 1 tablet by mouth 2 times daily 3/18/21   Rasta Miguel MD   calcitRIOL (ROCALTROL) 0.25 MCG capsule Take 0.25 mcg by mouth daily    Chuck Murray MD   bumetanide (BUMEX) 2 MG tablet Take 1 tablet by mouth daily 3/18/21   Rasta Miguel MD   levothyroxine (SYNTHROID) 100 MCG tablet Take 1 tablet by mouth Daily 3/18/21   Rasta Miguel MD   lisinopril (PRINIVIL;ZESTRIL) 40 MG tablet Take 1 tablet by mouth once daily 3/18/21   Rasta Miguel MD   baclofen (LIORESAL) 10 MG tablet Take 1 tablet by mouth 3 times daily As needed for hip pain 3/18/21   Channing Alva MD   escitalopram (LEXAPRO) 20 MG tablet Take 1 tablet by mouth daily 12/14/20   Channing Alva MD   fexofenadine (ALLEGRA) 180 MG tablet Take 1 tablet by mouth daily Indications: Allergic Rhinitis 12/14/20   Channing Alva MD   tiotropium (SPIRIVA RESPIMAT) 2.5 MCG/ACT AERS inhaler Inhale 2 puffs into the lungs daily 11/23/20   Channing Alva MD   potassium chloride (KLOR-CON) 10 MEQ extended release tablet Take 10 mEq by mouth daily  10/27/20   Historical Provider, MD   warfarin (COUMADIN) 7.5 MG tablet Take 2 tablets by mouth daily 2 tablets daily, DO NOT RESUME UNTIL YOU RECHECK YOUR INR ON THURSDAY AND DISCUSS WITH PCP  Patient taking differently: Take 15 mg by mouth daily Indications: stated did not take yesterday 4/28/21as her PT was 3.8 2 tablets daily, DO NOT RESUME UNTIL YOU RECHECK YOUR INR ON THURSDAY AND DISCUSS WITH PCP 9/30/20 4/14/21  [facility]ierrez [facility], DO   calcipotriene (DOVONEX) 0.005 % cream Use topically as needed 7/7/20   Channing Alva MD   clobetasol (TEMOVATE) 0.05 % cream Apply topically 2 times daily.  7/7/20   Channing Alva MD   ondansetron (ZOFRAN) 4 MG tablet Take 1 tablet by mouth daily as needed for Nausea or Vomiting 1/31/20   Channing Alva MD   Multiple Vitamins-Minerals (CENTRUM ADULTS) TABS Take 1 tablet by mouth daily    Historical Provider, MD   aspirin 81 MG tablet Take 81 mg by mouth daily    Historical Provider, MD        Allergies   Insect extract allergy skin test, Lactose intolerance (gi), Prednisone, Zanaflex [tizanidine hcl], Lortab [hydrocodone-acetaminophen], Other, Oxycodone-acetaminophen, and Ultram [tramadol hcl]    Social History     Social History     Tobacco History     Smoking Status  Never Smoker    Smokeless Tobacco Use  Never Used          Alcohol History     Alcohol Use Status  No          Drug Use     Drug Use Status  No Sexual Activity     Sexually Active  Not Currently                Family History     Family History   Problem Relation Age of Onset    Uterine Cancer Mother     Cervical Cancer Mother     Coronary Art Dis Mother     Heart Disease Father     Lung Cancer Father     Other Father         renal failure    Colon Cancer Brother     Colon Polyps Brother     Uterine Cancer Maternal Grandmother     Cervical Cancer Maternal Grandmother     Diabetes Maternal Grandmother     Cervical Cancer Sister     Other Sister         fibromyalgia    Diabetes Paternal Aunt     Esophageal Cancer Neg Hx     Liver Cancer Neg Hx     Liver Disease Neg Hx     Stomach Cancer Neg Hx     Rectal Cancer Neg Hx        Review of Systems   Review of Systems: 19 point system reviewed and negative except as stated above. Physical Exam   BP (!) 174/98   Pulse 78   Temp 97.6 °F (36.4 °C)   Resp 18   Ht 5' 7\" (1.702 m)   Wt 244 lb (110.7 kg)   SpO2 93%   BMI 38.22 kg/m²       Physical Exam  General: Alert, well-developed, no acute distress lying comfortably in bed. HEENT: Atraumatic normocephalic, range of motion normal, no JVD, no tracheal deviation noted. Cardiac: Normal S1-S2 no murmurs rub or gallop. Respiratory: Effort normal, breath sounds normal, clear To auscultation bilaterally, no rhonchi or rales, no wheezing  Abdomen: Soft, positive bowel sounds in all quadrants, no distention, nontender to palpation, no organomegaly noted, no rebound noted, no CVA tenderness. MSK/extremities: Right forearm swelling, tenderness, and induration noted, no deformity, moves all extremities  Skin: Warm  Psych: Affect normal and good eye contact, behavioral normal, thought content normal and judgment normal  Neurological: No focal deficits, alert and conversant, no formal disorientation noted. No sensory deficits, no abnormal coordination.         Labs      Recent Results (from the past 24 hour(s))   Protime-INR    Collection Time: 04/29/21 12:00 AM   Result Value Ref Range    INR 3.80    CBC Auto Differential    Collection Time: 04/29/21  1:25 PM   Result Value Ref Range    WBC 10.9 (H) 4.8 - 10.8 K/uL    RBC 3.34 (L) 4.20 - 5.40 M/uL    Hemoglobin 9.8 (L) 12.0 - 16.0 g/dL    Hematocrit 31.3 (L) 37.0 - 47.0 %    MCV 93.7 81.0 - 99.0 fL    MCH 29.3 27.0 - 31.0 pg    MCHC 31.3 (L) 33.0 - 37.0 g/dL    RDW 16.1 (H) 11.5 - 14.5 %    Platelets 922 402 - 556 K/uL    MPV 11.7 9.4 - 12.3 fL    Neutrophils % 68.3 (H) 50.0 - 65.0 %    Lymphocytes % 19.7 (L) 20.0 - 40.0 %    Monocytes % 10.6 (H) 0.0 - 10.0 %    Eosinophils % 0.5 0.0 - 5.0 %    Basophils % 0.3 0.0 - 1.0 %    Neutrophils Absolute 7.5 1.5 - 7.5 K/uL    Immature Granulocytes # 0.1 K/uL    Lymphocytes Absolute 2.2 1.1 - 4.5 K/uL    Monocytes Absolute 1.20 (H) 0.00 - 0.90 K/uL    Eosinophils Absolute 0.10 0.00 - 0.60 K/uL    Basophils Absolute 0.00 0.00 - 0.20 K/uL   Lactic Acid, Plasma    Collection Time: 04/29/21  1:25 PM   Result Value Ref Range    Lactic Acid 1.0 0.5 - 1.9 mmol/L   Comprehensive Metabolic Panel    Collection Time: 04/29/21  1:25 PM   Result Value Ref Range    Sodium 140 136 - 145 mmol/L    Potassium 4.1 3.5 - 5.0 mmol/L    Chloride 107 98 - 111 mmol/L    CO2 24 22 - 29 mmol/L    Anion Gap 9 7 - 19 mmol/L    Glucose 86 74 - 109 mg/dL    BUN 14 8 - 23 mg/dL    CREATININE 1.1 (H) 0.5 - 0.9 mg/dL    GFR Non- 49 (A) >60    GFR African American >59 >59    Calcium 8.8 8.8 - 10.2 mg/dL    Total Protein 7.9 6.6 - 8.7 g/dL    Albumin 3.7 3.5 - 5.2 g/dL    Total Bilirubin 1.0 0.2 - 1.2 mg/dL    Alkaline Phosphatase 88 35 - 104 U/L    ALT 11 5 - 33 U/L    AST 24 5 - 32 U/L   Sedimentation Rate    Collection Time: 04/29/21  1:25 PM   Result Value Ref Range    Sed Rate 53 (H) 0 - 25 mm/Hr   C-REACTIVE PROTEIN    Collection Time: 04/29/21  1:25 PM   Result Value Ref Range    CRP 5.92 (H) 0.00 - 0.50 mg/dL   PROTIME-INR    Collection Time: 04/29/21  1:25 PM   Result Value Ref Range    Protime 30.0 (H) 12.0 - 14.6 sec    INR 2.77 (H) 0.88 - 1.18   APTT    Collection Time: 04/29/21  1:25 PM   Result Value Ref Range    aPTT 44.8 (H) 26.0 - 36.2 sec   Culture, Wound    Collection Time: 04/29/21  2:08 PM    Specimen: Arm   Result Value Ref Range    Gram Stain Result       Rare WBC's (Polymorphonuclear) present  Rare Gram positive cocci  in pairs  No Epithelial Cells seen     COVID-19, Rapid    Collection Time: 04/29/21  3:29 PM    Specimen: Nasopharyngeal Swab   Result Value Ref Range    SARS-CoV-2, NAAT Not Detected Not Detected        Imaging/Diagnostics Last 24 Hours   Xr Radius Ulna Right (2 Views)    Result Date: 4/29/2021  History: Dog bite infection Right forearm: 2 views right forearm are obtained. There is prominent soft tissue swelling of the lateral right forearm a small amount of subcutaneous air representing the site of the dog bite and soft tissue infection. No fracture or dislocation identified. No underlying osteomyelitis. 1. Prominent soft tissue swelling of the right proximal lateral forearm with a small amount of subcutaneous emphysema representing the site of the dog bite and soft tissue infection. No underlying bony pathology. Signed by Dr Nancy Snow on 4/29/2021 12:48 PM      Assessment      Hospital Problems           Last Modified POA    Soft tissue infection 4/29/2021 Yes          Plan       Right forearm soft tissue infection with questionable abscess status post dog bites  Status post vancomycin and Zosyn in the ED will continue for now. Follow MRI of the arm to rule out associated abscess  IV fluids 100 cc an hour  Infectious disease consultation  Surgery consultation for possible I&D. Hold anticoagulation for now, acute DVT prophylaxis. Wound care consultation. Hypertension  Continue lisinopril    CKD stage II  Currently stable, avoid nephrotoxic agent. Hypothyroidism: Continue Synthroid. GERD: Continue pantoprazole.     History of DVT  Hold full dose anticoagulation for now    Obstructive sleep apnea: Can use home CPAP. Type 2 diabetes    Depression: Continue Lexapro      Code: Full  DVT prophylaxis: Enoxaparin  Diet: Renal  Disposition: Pending improvement in above status.       Consultations Ordered:  PHARMACY TO DOSE VANCOMYCIN  IP CONSULT TO ORTHOPEDIC SURGERY  IP CONSULT TO INFECTIOUS DISEASES  IP CONSULT TO PHARMACY    Electronically signed by Alhaji Gtz MD on 4/29/21 at 4:48 PM CDT

## 2021-04-29 NOTE — PROGRESS NOTES
Hyperglycemia R73.9    MER (obstructive sleep apnea) G47.33    Slow transit constipation K59.01    Iron deficiency anemia D50.9    Restrictive airway disease J98.4    DVT, lower extremity, proximal, acute, unspecified laterality ([facility]) I82.4Y9    H/O systemic lupus erythematosus (SLE) ([facility]) M32.9    Anticoagulated on Coumadin Z79.01    Burn of abdomen wall, second degree, initial encounter T21.22XA    Postmenopausal osteoporosis M81.0    Type II diabetes mellitus with nephropathy ([facility]) E11.21    Pacemaker Z95.0    History of DVT (deep vein thrombosis) Z86.718    Lupus anticoagulant disorder ([facility]) D68.62    History of pulmonary embolism Z86.711    Thrombocytopenia ([facility]) D69.6    Hypothyroidism E03.9    Morbidly obese ([facility]) E66.01    Asthmatic bronchitis without complication I09.857    Gastroenteritis R52.7    Colic D69.51    Abdominal pain R10.9    Intractable nausea and vomiting R11.2    Severe episode of recurrent major depressive disorder, without psychotic features ([facility] [address] F33.2    COVID-19 U07.1    Generalized weakness R53.1    Accidental fall from bed W06. [facility] Palliative care patient Z51.5    Lower abdominal pain R10.30    Loose stools R19.5    Diarrhea R19.7    Chronic heartburn R12    S/P gastric bypass Z98.84    Dog bite W54. 0XXA    Cellulitis of right upper extremity L03. 113    Abscess of right arm L02.413       Baldev Salcido is a 69 y.o. female with the following history reviewed and recorded in [facility]:    Current Outpatient Medications   Medication Sig Dispense Refill    doxycycline hyclate (VIBRAMYCIN) 100 MG capsule Take 1 capsule by mouth 2 times daily for 10 days 20 capsule 0    sodium hypochlorite (DAKINS) 0.125 % SOLN external solution Moisten gauze apply to wound twice daily 1000 mL 2    CPAP Machine MISC Inhale 1 each into the lungs nightly      enoxaparin (LOVENOX) 100 MG/ML injection 1ml BID 10 Syringe 0    Cyanocobalamin 1000 MCG/ML KIT Inject as directed every 30 days      gabapentin (NEURONTIN) 100 MG capsule TAKE 1 CAPSULE BY MOUTH THREE TIMES DAILY 270 capsule 0    pantoprazole (PROTONIX) 40 MG tablet TAKE 1 TABLET BY MOUTH TWICE DAILY BEFORE MEAL(S) 30 tablet 0    ferrous gluconate (FERGON) 240 (27 Fe) MG tablet Take 1 tablet by mouth 2 times daily 180 tablet 2    calcitRIOL (ROCALTROL) 0.25 MCG capsule Take 0.25 mcg by mouth daily      bumetanide (BUMEX) 2 MG tablet Take 1 tablet by mouth daily 30 tablet 5    levothyroxine (SYNTHROID) 100 MCG tablet Take 1 tablet by mouth Daily 90 tablet 3    lisinopril (PRINIVIL;ZESTRIL) 40 MG tablet Take 1 tablet by mouth once daily 90 tablet 1    baclofen (LIORESAL) 10 MG tablet Take 1 tablet by mouth 3 times daily As needed for hip pain 10 tablet 1    escitalopram (LEXAPRO) 20 MG tablet Take 1 tablet by mouth daily 90 tablet 3    fexofenadine (ALLEGRA) 180 MG tablet Take 1 tablet by mouth daily Indications: Allergic Rhinitis 90 tablet 3    tiotropium (SPIRIVA RESPIMAT) 2.5 MCG/ACT AERS inhaler Inhale 2 puffs into the lungs daily 1 Inhaler 5    potassium chloride (KLOR-CON) 10 MEQ extended release tablet Take 10 mEq by mouth daily       calcipotriene (DOVONEX) 0.005 % cream Use topically as needed 3 Tube 3    clobetasol (TEMOVATE) 0.05 % cream Apply topically 2 times daily.  3 Tube 3    ondansetron (ZOFRAN) 4 MG tablet Take 1 tablet by mouth daily as needed for Nausea or Vomiting 30 tablet 0    Multiple Vitamins-Minerals (CENTRUM ADULTS) TABS Take 1 tablet by mouth daily      aspirin 81 MG tablet Take 81 mg by mouth daily      warfarin (COUMADIN) 7.5 MG tablet Take 2 tablets by mouth daily 2 tablets daily, DO NOT RESUME UNTIL YOU RECHECK YOUR INR ON THURSDAY AND DISCUSS WITH PCP (Patient taking differently: Take 15 mg by mouth daily Indications: stated did not take yesterday 4/28/21as her PT was 3.8 2 tablets daily, DO NOT RESUME UNTIL YOU RECHECK YOUR INR ON THURSDAY AND DISCUSS WITH PCP) 60 tablet 0 Current Facility-Administered Medications   Medication Dose Route Frequency Provider Last Rate Last Admin    lidocaine PF 1 % injection 10 mL  10 mL Intradermal Once CHANELLE Lr - CNP        cyanocobalamin injection 1,000 mcg  1,000 mcg Intramuscular Once Angi Adkins MD         Allergies: Insect extract allergy skin test, Lactose intolerance (gi), Prednisone, Zanaflex [tizanidine hcl], Lortab [hydrocodone-acetaminophen], Other, Oxycodone-acetaminophen, and Ultram [tramadol hcl]  Past Medical History:   Diagnosis Date    Abdominal pain     Abnormal EKG     Acute sinusitis     Allergic reaction to spider bite     Anemia     Anticoagulated     Anxiety     Arrhythmia     Asthmatic bronchitis without complication 9/37/1736    Ataxic gait     Lyons's esophagus     Bowel obstruction (HCC)     Callus     Cardiac pacemaker     CKD (chronic kidney disease) stage 2, GFR 60-89 ml/min     Coat's syndrome     Coat's syndrome     both eyes    Depression     Diabetes mellitus type 2 in nonobese (HCC)     Diabetic nephropathy (HCC)     Disequilibrium     Dizziness     DVT (deep venous thrombosis) (HCC)     Exudative retinopathy     Fibromyalgia     Fibromyositis     Gastric ulcer     GERD (gastroesophageal reflux disease)     History of gastric bypass     Hx of lupus anticoagulant disorder     Hypertension     Hypothyroidism     Intermittent claudication (HCC)     Intestinal obstruction (HCC)     Iron deficiency     Left-sided weakness     Low vitamin D level     Lupus (HCC)     Menopause     Obesity     Osteoarthritis     Osteoporosis     Other iron deficiency anemias     Palliative care patient 09/24/2020    Pernicious anemia     PUPP (pruritic urticarial papules and plaques of pregnancy)     Right leg numbness     Right sided sciatica     Sarcoidosis     with liver involvement    Sciatica     Secondary hyperparathyroidism ([facility] [address]     Shingles     Shortness of breath     Sleep apnea     Stomach ulcer     Syncope     Visual loss, one eye        Past Surgical History:   Procedure Laterality Date    APPENDECTOMY      CARDIAC CATHETERIZATION  10/21/13  [facility]    EF over 60%    CHOLECYSTECTOMY      COLONOSCOPY  02/2010    negative    COLONOSCOPY  02/22/2010    Dr Homer Prader    COLONOSCOPY  04/01/2016    Dr LAKHWINDER Horvath-internal hemorrhoids, 5 yr recall    DILATATION, ESOPHAGUS      EYE SURGERY      Cyst on Right eye    EYE SURGERY      FRACTURE SURGERY      totsl knee replacement    GASTRIC BYPASS SURGERY      GASTRIC BYPASS SURGERY      HERNIA REPAIR      HYSTERECTOMY      Complete    HYSTERECTOMY      Partial - because had a tubal pregnancy.      INCONTINENCE SURGERY      Bladder Sling    OTHER SURGICAL HISTORY      IVC filter    PACEMAKER INSERTION      PACEMAKER PLACEMENT      medtronic    AR TOTAL KNEE ARTHROPLASTY Right 03/26/2018    TOTAL KNEE REPLACEMENT COMPLEX PRIMARY performed by Jazmin Pollard [facility] at [facility] [facility] SMALL INTESTINE SURGERY      SPLENECTOMY      KRISTEL AND BSO      TONSILLECTOMY AND ADENOIDECTOMY      UPPER GASTROINTESTINAL ENDOSCOPY  12/2011    gerd s/p gastric bypass    UPPER GASTROINTESTINAL ENDOSCOPY  02/2014    normal s/p gastric bypass    UPPER GASTROINTESTINAL ENDOSCOPY  02/2010    biopsy neg Barretts, chronic reflux esophagitis s/p gastric bypass    UPPER GASTROINTESTINAL ENDOSCOPY  07/2006    unremarkable s/p gastric bypass    UPPER GASTROINTESTINAL ENDOSCOPY  08/10/2015    Dr Yoselin Gleason UPPER GASTROINTESTINAL ENDOSCOPY N/A 04/01/2016    Dr. Aubrie Koroma:  H Pylori(-), HH, o/w normal    VENA CAVA FILTER PLACEMENT Right 2003     Family History   Problem Relation Age of Onset    Uterine Cancer Mother     Cervical Cancer Mother     Coronary Art Dis Mother     Heart Disease Father     Lung Cancer Father     Other Father         renal failure    Colon Cancer Brother     Colon Polyps Brother     Uterine Cancer Maternal Grandmother     Cervical Cancer Maternal Grandmother     Diabetes Maternal Grandmother     Cervical Cancer Sister     Other Sister         fibromyalgia    Diabetes Paternal Aunt     Esophageal Cancer Neg Hx     Liver Cancer Neg Hx     Liver Disease Neg Hx     Stomach Cancer Neg Hx     Rectal Cancer Neg Hx      Social History     Tobacco Use    Smoking status: Never Smoker    Smokeless tobacco: Never Used   Substance Use Topics    Alcohol use: No         Review of Systems    Review of Systems   Skin: Positive for color change and wound. Allergic/Immunologic:        Right axilla lymph note edema and soreness   All other systems reviewed and are negative. All other review of systems are negative. Physical Exam    BP (!) 146/83   Pulse 62   Temp 97.5 °F (36.4 °C) (Temporal)   Resp 18   Ht 5' 7\" (1.702 m)   Wt 244 lb (110.7 kg)   BMI 38.22 kg/m²     Physical Exam  Vitals signs reviewed. Constitutional:       Appearance: Normal appearance. She is obese. HENT:      Head: Normocephalic and atraumatic. Right Ear: External ear normal.      Left Ear: External ear normal.   Eyes:      General: Lids are normal. Lids are everted, no foreign bodies appreciated. Vision grossly intact. Gaze aligned appropriately. Neck:      Musculoskeletal: Normal range of motion. Cardiovascular:      Rate and Rhythm: Normal rate and regular rhythm. Pulses: Normal pulses. Heart sounds: Normal heart sounds. Pulmonary:      Effort: Pulmonary effort is normal.      Breath sounds: Normal breath sounds. Abdominal:      General: Bowel sounds are normal.   Musculoskeletal: Normal range of motion. Skin:     General: Skin is warm and dry. Findings: Wound present. Neurological:      Mental Status: She is alert and oriented to person, place, and time.    Psychiatric:         Mood and Affect: Mood normal.         Behavior: Behavior normal.         Thought Content: Thought content normal.         Judgment: Judgment normal.             Post Debridement Measurements and Assessment:    The patientspain isPain Level: 8 Pain Type: Acute pain. Wound is has improved. Please refer to nursing measurements and assessment regarding wound pre and postdebridement. Incision and Drainage Note    Type of Incision and Drainage:      Abcess soft tissue deep    Performed by: Corinne Raymond, APRN - CNP    Consent obtained: Yes    Time out taken: Yes    Pain Control: Anesthetic: 1% Lidocaine Injectable     Drainage Type: moderate, serosanguinous    Instruments: curette and #15 blade scalpel    Location of Incision and Drainage:    Wound/Ulcer #: 1    Incision and Drainage Size cm  Wound 08/27/18 Burn Abdomen Quadrant; Left Wound 1 - Left lower quadrant - Burn (Active)   Number of days: 975       Wound 04/29/21 Arm Proximal;Right wound #1 right forearm cluster (r/t dog bite 4/12/2021) (Active)   Wound Image     04/29/21 1023   Wound Etiology Traumatic 04/29/21 1023   Dressing Status New dressing applied 04/29/21 1103   Wound Cleansed Soap and water 04/29/21 1023   Dressing/Treatment Packing 04/29/21 1103   Wound Length (cm) 4.5 cm 04/29/21 1023   Wound Width (cm) 0.6 cm 04/29/21 1023   Wound Depth (cm) 0.1 cm 04/29/21 1023   Wound Surface Area (cm^2) 2.7 cm^2 04/29/21 1023   Wound Volume (cm^3) 0.27 cm^3 04/29/21 1023   Distance Tunneling (cm) 0 cm 04/29/21 1023   Tunneling Position ___ O'Clock 0 04/29/21 1023   Undermining Starts ___ O'Clock 0 04/29/21 1023   Undermining Ends___ O'Clock 0 04/29/21 1023   Undermining Maxium Distance (cm) 0 04/29/21 1023   Wound Assessment Eschar dry 04/29/21 1023   Drainage Amount None 04/29/21 1023   Odor Fecal 04/29/21 1023   Jeanne-wound Assessment Ecchymosis;Edematous; Induration; Hypopigmented 04/29/21 1023   Margins Defined edges 04/29/21 1023   Number of days: 0        Culture sent: No: patient currently on Augmentin     Bleeding:with a moderate amount of bleeding    Hemostasis Achieved: by pressure    Procedural Pain: 0  / 10     Post Procedural Pain: 0 / 10     Response to treatment: Well tolerated by patient. Assessment    1. Dog bite, subsequent encounter    2. Cellulitis of right upper extremity    3. Abscess of right arm    4. H/O systemic lupus erythematosus (SLE) ([facility] [address]    5. Anticoagulated on Coumadin    6. Type II diabetes mellitus with nephropathy ([facility] [address]    7. Pacemaker    8. Morbidly obese ([facility] [address]          Plan    1. Send to ER for IV antibiotic and evaluation-per Dr. Carol West recommendation    Plan for wound - Dress per physician order  Treatment:     Compression : No   Offloading : No      Dressing Orders: Right Forearm:   Wash with saline. Apply 1/4 STR Dakins moistened nugauze to wound beds. Cover with gauze. Secure with roll gauze and medipore tape. Change twice daily . Discussed importance of wound care. Patient understanding and questions answered. I spent a total of 5 minutes face to face with the patient. Over 100% of that time was spent on counseling and care coordination. Patient was told that if symptoms worsen or new symptoms develop they are to go to the emergency department immediately. Patient was educated on diagnosis and treatment plan. All of patient's questions were answered, and the patient understands the discharge plan. Discussed appropriate home care of this wound. Wound redressed. Patient instructions were given.   Recommend no smoking  Offloading instructions given

## 2021-04-29 NOTE — PROGRESS NOTES
[address] [facility]  [address] [address] [place] [address]  Dept: [phone]  Dept Fax: [phone]: [phone]      Visit Date: 4/29/2021    Christ Aguilar a 69 y.o. female who presents today for:  Chief Complaint   Patient presents with    Other     dog bite rt arm 4/12,finished augmentin         HPI:     Patient of Dr. Blossom Ochoa is in for problem visit. She was bitten by her dog a week or so ago and went to the emergency room was placed on Augmentin. She is finished the Augmentin up and the arm is swollen red increasingly painful and it looks terrible and she is here for follow-up.     Past Medical History:   Diagnosis Date    Abdominal pain     Abnormal EKG     Acute sinusitis     Allergic reaction to spider bite     Anemia     Anticoagulated     Anxiety     Arrhythmia     Asthmatic bronchitis without complication 0/78/2938    Ataxic gait     Lyons's esophagus     Bowel obstruction (HCC)     Callus     Cardiac pacemaker     CKD (chronic kidney disease) stage 2, GFR 60-89 ml/min     Coat's syndrome     Coat's syndrome     both eyes    Depression     Diabetes mellitus type 2 in nonobese (HCC)     Diabetic nephropathy (HCC)     Disequilibrium     Dizziness     DVT (deep venous thrombosis) (HCC)     Exudative retinopathy     Fibromyalgia     Fibromyositis     Gastric ulcer     GERD (gastroesophageal reflux disease)     History of gastric bypass     Hx of lupus anticoagulant disorder     Hypertension     Hypothyroidism     Intermittent claudication (HCC)     Intestinal obstruction (HCC)     Iron deficiency     Left-sided weakness     Low vitamin D level     Lupus (HCC)     Menopause     Obesity     Osteoarthritis     Osteoporosis     Other iron deficiency anemias     Palliative care patient 09/24/2020    Pernicious anemia     PUPP (pruritic urticarial papules and plaques of pregnancy)     Right leg numbness chloride (KLOR-CON) 10 MEQ extended release tablet Take 10 mEq by mouth daily       warfarin (COUMADIN) 7.5 MG tablet Take 2 tablets by mouth daily 2 tablets daily, DO NOT RESUME UNTIL YOU RECHECK YOUR INR ON THURSDAY AND DISCUSS WITH PCP 60 tablet 0    calcipotriene (DOVONEX) 0.005 % cream Use topically as needed 3 Tube 3    clobetasol (TEMOVATE) 0.05 % cream Apply topically 2 times daily. 3 Tube 3    ondansetron (ZOFRAN) 4 MG tablet Take 1 tablet by mouth daily as needed for Nausea or Vomiting 30 tablet 0    Multiple Vitamins-Minerals (CENTRUM ADULTS) TABS Take 1 tablet by mouth daily      aspirin 81 MG tablet Take 81 mg by mouth daily       Current Facility-Administered Medications   Medication Dose Route Frequency Provider Last Rate Last Admin    cyanocobalamin injection 1,000 mcg  1,000 mcg Intramuscular Once Concepción MD Adrián         Allergies   Allergen Reactions    Insect Extract Allergy Skin Test Anaphylaxis     Bee stings    Lactose Intolerance (Gi)     Prednisone      Headache and upset stomach    Zanaflex [Tizanidine Hcl]      Passed out lost control of body functions    Lortab [Hydrocodone-Acetaminophen] Nausea And Vomiting     Sweats, weak, nausea and vomiting    Other Nausea And Vomiting     Opiates------sweating, weak        Oxycodone-Acetaminophen Nausea And Vomiting     Sweating and vomiting     Ultram [Tramadol Hcl] Nausea And Vomiting     Sweating, weak, nausea and vomiting           Subjective:     Review of Systems   Skin:        Bite on the right arm with a large amount of pus induration and swelling. Objective:      BP (!) 170/94   Pulse 68   Temp 98.4 °F (36.9 °C)    Physical Exam  Constitutional:       General: She is not in acute distress. Appearance: She is well-developed. She is not diaphoretic. HENT:      Head: Normocephalic and atraumatic.       Right Ear: External ear normal.      Left Ear: External ear normal.      Nose: Nose normal.      Mouth/Throat: Pharynx: No oropharyngeal exudate. Eyes:      General: No scleral icterus. Right eye: No discharge. Left eye: No discharge. Conjunctiva/sclera: Conjunctivae normal.      Pupils: Pupils are equal, round, and reactive to light. Neck:      Musculoskeletal: Normal range of motion and neck supple. Thyroid: No thyromegaly. Vascular: No JVD. Trachea: No tracheal deviation. Cardiovascular:      Rate and Rhythm: Normal rate and regular rhythm. Heart sounds: Normal heart sounds. No murmur. No friction rub. No gallop. Pulmonary:      Effort: Pulmonary effort is normal. No respiratory distress. Breath sounds: Normal breath sounds. No wheezing or rales. Abdominal:      General: Bowel sounds are normal. There is no distension. Palpations: Abdomen is soft. There is no mass. Tenderness: There is no abdominal tenderness. There is no guarding or rebound. Musculoskeletal: Normal range of motion. General: No tenderness or deformity. Lymphadenopathy:      Cervical: No cervical adenopathy. Skin:     General: Skin is warm. Coloration: Skin is not pale. Findings: Erythema and lesion present. No rash. Comments: Pus filled hematoma on the right arm with some blood oozing from the bite marks. She is got some adenopathy under the right axilla as well. Neurological:      Mental Status: She is alert and oriented to person, place, and time. Cranial Nerves: No cranial nerve deficit. Motor: No abnormal muscle tone. Coordination: Coordination normal.      Deep Tendon Reflexes: Reflexes are normal and symmetric. Reflexes normal.   Psychiatric:         Behavior: Behavior normal.         Thought Content: Thought content normal.         Judgment: Judgment normal.          Assessment:      Diagnosis Orders   1. Dog bite, initial encounter     2.  Cellulitis of right upper extremity              Plan:     Dog bite is been treated with Augmentin and now with a fulminant cellulitis of the right arm and a lot of induration and swelling and it looks as though it needs to be drained. I will start her on doxycycline 100 mg twice daily and I am going to get her into this wound care today to see if we can get this thing opened up and probably is going to have to have a drain put in. She is to keep scheduled follow-up appointment with Dr. Brook Tavares. No follow-ups on file. Patient given educational materials- see patient instructions. Discussed use, benefit, and side effects of prescribedmedications. All patient questions answered. Pt voiced understanding. Reviewedhealth maintenance. Instructed to continue current medications, diet and exercise. Patient agreed with treatment plan. **This report has been created usingvoice recognition software. It may contain minor errors which are inherent in voicerecognition technology. **    Electronically signed by Garrett Hall MD on 4/29/2021 at 9:32 AM

## 2021-04-29 NOTE — CONSULTS
Consult Note            Date:4/29/2021        Patient Name:Leny Stone     YOB: 1950     Age:70 y.o. Consults    Chief Complaint     Chief Complaint   Patient presents with    Arm Swelling     R arm, dog bite weeks ago, sent by wound care        History Obtained From   Patient    History of Present Illness   70 yo F, Diabetic,  presents to hospital from wound care clinic with dog bite to right forearm, just below elbow. Patient states she has taken antibiotics but notices continued pus drainage and swelling. The wound was packed by the wound care center earlier today. She denies fevers, chills, nausea, vomiting.     Past Medical History     Past Medical History:   Diagnosis Date    Abdominal pain     Abnormal EKG     Acute sinusitis     Allergic reaction to spider bite     Anemia     Anticoagulated     Anxiety     Arrhythmia     Asthmatic bronchitis without complication 7/79/6350    Ataxic gait     Lyons's esophagus     Bowel obstruction (HCC)     Callus     Cardiac pacemaker     CKD (chronic kidney disease) stage 2, GFR 60-89 ml/min     Coat's syndrome     Coat's syndrome     both eyes    Depression     Diabetes mellitus type 2 in nonobese (HCC)     Diabetic nephropathy (HCC)     Disequilibrium     Dizziness     DVT (deep venous thrombosis) (HCC)     Exudative retinopathy     Fibromyalgia     Fibromyositis     Gastric ulcer     GERD (gastroesophageal reflux disease)     History of gastric bypass     Hx of lupus anticoagulant disorder     Hypertension     Hypothyroidism     Intermittent claudication (HCC)     Intestinal obstruction (HCC)     Iron deficiency     Left-sided weakness     Low vitamin D level     Lupus (HCC)     Menopause     Obesity     Osteoarthritis     Osteoporosis     Other iron deficiency anemias     Palliative care patient 09/24/2020    Pernicious anemia     PUPP (pruritic urticarial papules and plaques of pregnancy)     Right leg numbness     Right sided sciatica     Sarcoidosis     with liver involvement    Sciatica     Secondary hyperparathyroidism ([address]     Shingles     Shortness of breath     Sleep apnea     Stomach ulcer     Syncope     Visual loss, one eye         Past Surgical History     Past Surgical History:   Procedure Laterality Date    APPENDECTOMY      CARDIAC CATHETERIZATION  10/21/13  [address]    EF over 60%    CHOLECYSTECTOMY      COLONOSCOPY  02/2010    negative    COLONOSCOPY  02/22/2010    Dr Whalen Erp    COLONOSCOPY  04/01/2016    Dr LAKHWINDER Horvath-internal hemorrhoids, 5 yr recall    DILATATION, ESOPHAGUS      EYE SURGERY      Cyst on Right eye    EYE SURGERY      FRACTURE SURGERY      totsl knee replacement    GASTRIC BYPASS SURGERY      GASTRIC BYPASS SURGERY      HERNIA REPAIR      HYSTERECTOMY      Complete    HYSTERECTOMY      Partial - because had a tubal pregnancy.      INCONTINENCE SURGERY      Bladder Sling    OTHER SURGICAL HISTORY      IVC filter    PACEMAKER INSERTION      PACEMAKER PLACEMENT      medtronic    OK TOTAL KNEE ARTHROPLASTY Right 03/26/2018    TOTAL KNEE REPLACEMENT COMPLEX PRIMARY performed by Tahira Mac MD at [address] [place] [address]      SMALL INTESTINE SURGERY      SPLENECTOMY      KRISTEL AND BSO      TONSILLECTOMY AND ADENOIDECTOMY      UPPER GASTROINTESTINAL ENDOSCOPY  12/2011    gerd s/p gastric bypass    UPPER GASTROINTESTINAL ENDOSCOPY  02/2014    normal s/p gastric bypass    UPPER GASTROINTESTINAL ENDOSCOPY  02/2010    biopsy neg Barretts, chronic reflux esophagitis s/p gastric bypass    UPPER GASTROINTESTINAL ENDOSCOPY  07/2006    unremarkable s/p gastric bypass    UPPER GASTROINTESTINAL ENDOSCOPY  08/10/2015    Dr Jennifer Zeng UPPER GASTROINTESTINAL ENDOSCOPY N/A 04/01/2016    Dr. Alie Barr:  H Pylori(-), HH, o/w normal    VENA CAVA FILTER PLACEMENT Right 2003        Medications     Prior to Admission medications    Medication Sig Start Date End Date Taking? Authorizing Provider   doxycycline hyclate (VIBRAMYCIN) 100 MG capsule Take 1 capsule by mouth 2 times daily for 10 days 4/29/21 5/9/21  Galina Anderson MD   sodium hypochlorite (DAKINS) 0.125 % SOLN external solution Moisten gauze apply to wound twice daily 4/29/21   Kale Peng, CHANELLE - CNP   CPAP Machine MISC Inhale 1 each into the lungs nightly    Historical Provider, MD   enoxaparin (LOVENOX) 100 MG/ML injection 1ml BID 4/12/21   Georgina Argueta MD   Cyanocobalamin 1000 MCG/ML KIT Inject as directed every 30 days    Historical Provider, MD   gabapentin (NEURONTIN) 100 MG capsule TAKE 1 CAPSULE BY MOUTH THREE TIMES DAILY 3/18/21 6/16/21  Georgina Argueta MD   pantoprazole (PROTONIX) 40 MG tablet TAKE 1 TABLET BY MOUTH TWICE DAILY BEFORE MEAL(S) 3/18/21   Goergina Argueta MD   ferrous gluconate (FERGON) 240 (27 Fe) MG tablet Take 1 tablet by mouth 2 times daily 3/18/21   Georgina Argueta MD   calcitRIOL (ROCALTROL) 0.25 MCG capsule Take 0.25 mcg by mouth daily    Jing Alarcon MD   bumetanide (BUMEX) 2 MG tablet Take 1 tablet by mouth daily 3/18/21   Georgina Argueta MD   levothyroxine (SYNTHROID) 100 MCG tablet Take 1 tablet by mouth Daily 3/18/21   Georgina Argueta MD   lisinopril (PRINIVIL;ZESTRIL) 40 MG tablet Take 1 tablet by mouth once daily 3/18/21   Georgina Argueta MD   baclofen (LIORESAL) 10 MG tablet Take 1 tablet by mouth 3 times daily As needed for hip pain 3/18/21   Georgina Argueta MD   escitalopram (LEXAPRO) 20 MG tablet Take 1 tablet by mouth daily 12/14/20   Georgina Argueta MD   fexofenadine (ALLEGRA) 180 MG tablet Take 1 tablet by mouth daily Indications:  Allergic Rhinitis 12/14/20   Georgina Argueta MD   tiotropium (SPIRIVA RESPIMAT) 2.5 MCG/ACT AERS inhaler Inhale 2 puffs into the lungs daily 11/23/20   Georgina Argueta MD   potassium chloride (KLOR-CON) 10 MEQ extended release tablet Take 10 mEq by mouth daily  10/27/20   Historical Provider, MD   warfarin wound at bedside tomorrow. Will explore woound to evacuate hematoma/fluid collection. In interim, IV Abx therapy recommended.     Electronically signed by Yasmine Souza DO on 4/29/21 at 4:51 PM CDT

## 2021-04-29 NOTE — ED PROVIDER NOTES
[facility]  eMERGENCY dEPARTMENT eNCOUnter      Pt Name: Luz Marina Clement  MRN: [id number]  Armsabdulazizgfurt 1950  Date of evaluation: 4/29/2021  Provider: Jaswinder Gaspar MD    [address] [place] [address]       Chief Complaint   Patient presents with    Arm Swelling     R arm, dog bite weeks ago, sent by wound care         HISTORY OF PRESENT ILLNESS   (Location/Symptom, Timing/Onset,Context/Setting, Quality, Duration, Modifying Factors, Severity)  Note limiting factors. Luz Marina Clement is a 69 y.o. female who presents to the emergency department with right arm swelling and pain and drainage. Was seen here April 13 after a dog bite from a pit bull and was placed on antibiotics. She followed up with her primary care doctor the next day. She continued local wound care. She states that she had dark bloody purulent drainage from the wound a few days ago. She followed up at wound care today and they packed the wound and sent her here for further evaluation. Patient has had increasing pain up her axilla and down her arm as well as increased swelling redness and heat to the injury site. Takes coumadin for ho pe and dvt. HPI    NursingNotes were reviewed. REVIEW OF SYSTEMS    (2-9 systems for level 4, 10 or more for level 5)     Review of Systems   Constitutional: Negative for chills and fever. HENT: Negative for rhinorrhea and sore throat. Respiratory: Negative for shortness of breath. Cardiovascular: Negative for chest pain and leg swelling. Gastrointestinal: Negative for abdominal pain, diarrhea, nausea and vomiting. Genitourinary: Negative for difficulty urinating. Musculoskeletal: Negative for back pain and neck pain. Skin: Positive for color change and wound. Negative for rash. Neurological: Negative for weakness and headaches. Psychiatric/Behavioral: Negative for confusion. All other systems reviewed and are negative.       A complete review of systems was performed and is negative except as noted above in the HPI.        PAST MEDICAL HISTORY     Past Medical History:   Diagnosis Date    Abdominal pain     Abnormal EKG     Acute sinusitis     Allergic reaction to spider bite     Anemia     Anticoagulated     Anxiety     Arrhythmia     Asthmatic bronchitis without complication 2/02/0394    Ataxic gait     Lyons's esophagus     Bowel obstruction (HCC)     Callus     Cardiac pacemaker     CKD (chronic kidney disease) stage 2, GFR 60-89 ml/min     Coat's syndrome     Coat's syndrome     both eyes    Depression     Diabetes mellitus type 2 in nonobese (HCC)     Diabetic nephropathy (HCC)     Disequilibrium     Dizziness     DVT (deep venous thrombosis) (HCC)     Exudative retinopathy     Fibromyalgia     Fibromyositis     Gastric ulcer     GERD (gastroesophageal reflux disease)     History of gastric bypass     Hx of lupus anticoagulant disorder     Hypertension     Hypothyroidism     Intermittent claudication (HCC)     Intestinal obstruction (HCC)     Iron deficiency     Left-sided weakness     Low vitamin D level     Lupus (HCC)     Menopause     Obesity     Osteoarthritis     Osteoporosis     Other iron deficiency anemias     Palliative care patient 09/24/2020    Pernicious anemia     PUPP (pruritic urticarial papules and plaques of pregnancy)     Right leg numbness     Right sided sciatica     Sarcoidosis     with liver involvement    Sciatica     Secondary hyperparathyroidism (HCC)     Shingles     Shortness of breath     Sleep apnea     Stomach ulcer     Syncope     Visual loss, one eye          SURGICAL HISTORY       Past Surgical History:   Procedure Laterality Date    APPENDECTOMY      CARDIAC CATHETERIZATION  10/21/13  [email or web address]    EF over 60%    CHOLECYSTECTOMY      COLONOSCOPY  02/2010    negative    COLONOSCOPY  02/22/2010    Dr Isabella Gomez    COLONOSCOPY  04/01/2016    Dr LAKHWINDER Horvath-internal hemorrhoids, 5 yr recall    DILATATION, ESOPHAGUS      EYE SURGERY      Cyst on Right eye    EYE SURGERY      FRACTURE SURGERY      totsl knee replacement    GASTRIC BYPASS SURGERY      GASTRIC BYPASS SURGERY      HERNIA REPAIR      HYSTERECTOMY      Complete    HYSTERECTOMY      Partial - because had a tubal pregnancy.  INCONTINENCE SURGERY      Bladder Sling    OTHER SURGICAL HISTORY      IVC filter    PACEMAKER INSERTION      PACEMAKER PLACEMENT      medtronic    DC TOTAL KNEE ARTHROPLASTY Right 03/26/2018    TOTAL KNEE REPLACEMENT COMPLEX PRIMARY performed by Hilary Velez MD at [address] [place] [address]      SPLENECTOMY      KRISTEL AND BSO      TONSILLECTOMY AND ADENOIDECTOMY      UPPER GASTROINTESTINAL ENDOSCOPY  12/2011    gerd s/p gastric bypass    UPPER GASTROINTESTINAL ENDOSCOPY  02/2014    normal s/p gastric bypass    UPPER GASTROINTESTINAL ENDOSCOPY  02/2010    biopsy neg Barretts, chronic reflux esophagitis s/p gastric bypass    UPPER GASTROINTESTINAL ENDOSCOPY  07/2006    unremarkable s/p gastric bypass    UPPER GASTROINTESTINAL ENDOSCOPY  08/10/2015    Dr Chinedu Easley UPPER GASTROINTESTINAL ENDOSCOPY N/A 04/01/2016    Dr. Whitten Fees:  H Pylori(-), HH, o/w normal    VENA CAVA FILTER PLACEMENT Right 2003         CURRENT MEDICATIONS       Previous Medications    ASPIRIN 81 MG TABLET    Take 81 mg by mouth daily    BACLOFEN (LIORESAL) 10 MG TABLET    Take 1 tablet by mouth 3 times daily As needed for hip pain    BUMETANIDE (BUMEX) 2 MG TABLET    Take 1 tablet by mouth daily    CALCIPOTRIENE (DOVONEX) 0.005 % CREAM    Use topically as needed    CALCITRIOL (ROCALTROL) 0.25 MCG CAPSULE    Take 0.25 mcg by mouth daily    CLOBETASOL (TEMOVATE) 0.05 % CREAM    Apply topically 2 times daily.     CPAP MACHINE MISC    Inhale 1 each into the lungs nightly    CYANOCOBALAMIN 1000 MCG/ML KIT    Inject as directed every 30 days    DOXYCYCLINE HYCLATE (VIBRAMYCIN) 100 MG CAPSULE    Take 1 capsule by mouth 2 times daily Cervical Cancer Sister     Other Sister         fibromyalgia    Diabetes Paternal Aunt     Esophageal Cancer Neg Hx     Liver Cancer Neg Hx     Liver Disease Neg Hx     Stomach Cancer Neg Hx     Rectal Cancer Neg Hx           SOCIAL HISTORY       Social History     Socioeconomic History    Marital status:      Spouse name: None    Number of children: 1    Years of education: None    Highest education level: None   Occupational History     Employer: [employer]    Social Needs    Financial resource strain: Not very hard    Food insecurity     Worry: Never true     Inability: Never true    Transportation needs     Medical: No     Non-medical: No   Tobacco Use    Smoking status: Never Smoker    Smokeless tobacco: Never Used   Substance and Sexual Activity    Alcohol use: No    Drug use: No    Sexual activity: Not Currently   Lifestyle    Physical activity     Days per week: 0 days     Minutes per session: 0 min    Stress: Only a little   Relationships    Social connections     Talks on phone: More than three times a week     Gets together: More than three times a week     Attends [employer] service: More than 4 times per year     Active member of club or organization: Yes     Attends meetings of clubs or organizations: More than 4 times per year     Relationship status:     Intimate partner violence     Fear of current or ex partner: None     Emotionally abused: None     Physically abused: None     Forced sexual activity: None   Other Topics Concern    None   Social History Narrative    Lives with daughter, son-in-law, granddaughter, niece. Drives very little, daughter usually transports pt. Works part time at [employer]. Has pet dog.        SCREENINGS    Clarisa Coma Scale  Eye Opening: Spontaneous  Best Verbal Response: Oriented  Best Motor Response: Obeys commands  Clarisa Coma Scale Score: 15        PHYSICAL EXAM    (up to 7 for level 4, 8 or more for level 5)     ED Triage Vitals   BP Temp Temp src Pulse Resp SpO2 Height Weight   04/29/21 1209 04/29/21 1208 -- 04/29/21 1208 04/29/21 1208 04/29/21 1208 -- --   (!) 178/88 97.6 °F (36.4 °C)  78 18 98 %         Physical Exam  Vitals signs and nursing note reviewed. Constitutional:       General: She is not in acute distress. Appearance: She is well-developed. She is not diaphoretic. HENT:      Head: Normocephalic and atraumatic. Eyes:      Pupils: Pupils are equal, round, and reactive to light. Neck:      Musculoskeletal: Normal range of motion and neck supple. Cardiovascular:      Rate and Rhythm: Normal rate and regular rhythm. Heart sounds: Normal heart sounds. Pulmonary:      Effort: Pulmonary effort is normal. No respiratory distress. Breath sounds: Normal breath sounds. Abdominal:      General: Bowel sounds are normal. There is no distension. Palpations: Abdomen is soft. Tenderness: There is no abdominal tenderness. Musculoskeletal: Normal range of motion. General: Swelling, tenderness and signs of injury present. Comments: Right forearm swollen, hot, red, tender with 2 puncture wounds with packing in place   Skin:     General: Skin is warm and dry. Findings: No rash. Neurological:      Mental Status: She is alert and oriented to person, place, and time. Cranial Nerves: No cranial nerve deficit. Motor: No abnormal muscle tone.       Coordination: Coordination normal.   Psychiatric:         Behavior: Behavior normal.         DIAGNOSTIC RESULTS     EKG: All EKG's are interpreted by the Emergency Department Physician who either signs or Co-signs this chart in the absence of a cardiologist.        RADIOLOGY:   Non-plain film images such as CT, Ultrasound and MRI are read by the radiologist. Plainradiographic images are visualized and preliminarily interpreted by the emergency physician with the below findings:        Interpretation per the Radiologist below, if available at the time of this note:    XR RADIUS ULNA RIGHT (2 VIEWS)   Final Result   1. Prominent soft tissue swelling of the right proximal lateral   forearm with a small amount of subcutaneous emphysema representing the   site of the dog bite and soft tissue infection. No underlying bony   pathology.    Signed by Dr Alexx Jacques on 4/29/2021 12:48 PM       [facility] [place]    (Results Pending)         ED BEDSIDE ULTRASOUND:   Performed by ED Physician - none    LABS:  Labs Reviewed   CBC WITH AUTO DIFFERENTIAL - Abnormal; Notable for the following components:       Result Value    WBC 10.9 (*)     RBC 3.34 (*)     Hemoglobin 9.8 (*)     Hematocrit 31.3 (*)     MCHC 31.3 (*)     RDW 16.1 (*)     Neutrophils % 68.3 (*)     Lymphocytes % 19.7 (*)     Monocytes % 10.6 (*)     Monocytes Absolute 1.20 (*)     All other components within normal limits   COMPREHENSIVE METABOLIC PANEL - Abnormal; Notable for the following components:    CREATININE 1.1 (*)     GFR Non- 49 (*)     All other components within normal limits   SEDIMENTATION RATE - Abnormal; Notable for the following components:    Sed Rate 53 (*)     All other components within normal limits   C-REACTIVE PROTEIN - Abnormal; Notable for the following components:    CRP 5.92 (*)     All other components within normal limits   PROTIME-INR - Abnormal; Notable for the following components:    Protime 30.0 (*)     INR 2.77 (*)     All other components within normal limits   APTT - Abnormal; Notable for the following components:    aPTT 44.8 (*)     All other components within normal limits   CULTURE, BLOOD 1   CULTURE, BLOOD 2   CULTURE, WOUND    Narrative:     ORDER#: [id number]                          ORDERED BY: BLU COOMBS  SOURCE: Arm Right                          COLLECTED:  04/29/21 14:08  ANTIBIOTICS AT [facility].:                      RECEIVED :  04/29/21 14:10   COVID-19, RAPID   LACTIC ACID, PLASMA   LACTIC ACID, PLASMA All other labs were within normal range or not returned as of this dictation. EMERGENCY DEPARTMENT COURSE and DIFFERENTIALDIAGNOSIS/MDM:   Vitals:    Vitals:    04/29/21 1209 04/29/21 1315 04/29/21 1331 04/29/21 1401   BP: (!) 178/88  (!) 178/99 (!) 168/89   Pulse:       Resp:       Temp:       SpO2:   98% 91%   Weight:  244 lb (110.7 kg)     Height:  5' 7\" (1.702 m)         MDM  D/w Dr Bogdan Vences for admission    D/w [facility] with vascular surgery and since case was d/w Dr Rosario Bernal with general surgery in the wound clinic, would recommend consulting them    D/w Dr Parul Diaz, on call with general surgery and she can see in consult but would like ortho to be consulted as may be more appropriate    D/w Dr Vladislav Reddy. He can see in consult. Would recommend IV abx and if no improvement, can MRI without contrast to see if there is a large pus pocket to be drained. Notified Dr Bogdan Vences and she will go ahead and order the MRI rather than wait    MRI is gone for the day, he will be placed on the list for tomorrow. CONSULTS:  PHARMACY TO DOSE VANCOMYCIN  IP CONSULT TO ORTHOPEDIC SURGERY    PROCEDURES:  Unless otherwise notedbelow, none     Procedures    FINAL IMPRESSION     1.  Wound infection          DISPOSITION/PLAN   DISPOSITION Admitted 04/29/2021 02:29:05 PM      PATIENT REFERRED TO:  @FUP@    DISCHARGE MEDICATIONS:  New Prescriptions    No medications on file          (Please note that portions of this note were completed with a voice recognition program.  Efforts were made to edit the dictations butoccasionally words are mis-transcribed.)    Lj Darden MD (electronically signed)  AttendingEmergency Physician         Lj Darden MD  04/29/21 [facility] [place] [address], [place]  04/29/21 0783

## 2021-04-29 NOTE — PROGRESS NOTES
HOME MONITORING REPORT    INR today:   Results for orders placed or performed in visit on 04/29/21   Protime-INR   Result Value Ref Range    INR 3.80        INR Goal: 2.0-3.0    Dosing Plan  As of 4/29/2021    TTR:  21.1 % (3 y)   Full warfarin instructions:  4/29: Hold; Otherwise 7.5 mg every Wed; 15 mg all other days              PLAN: Advised patient/caregiver to hold her dose tonight, then continue current dose and recheck in one week. Patient/Caregiver voiced understanding      Electronically signed by Yvonne Terrazas MD on 5/1/2021 at 9:12 AM    I have reviewed nursing plan for Coumadin management and agree with plan.

## 2021-04-29 NOTE — CARE COORDINATION
family  Steps:  na    Plans to RETURN to current housing: yes  Barriers to RETURNING to current housing:  no    Currently ACTIVE with Home Health CARE:  no  [address]:  no    DME Provider:  no    Has a pulse oximetry unit at home: no    Had [address] [facility] prior to admission:  no  [address]:  no  Informed of need to bring portable home O2 tank to hospital on day of DISCHARGE:  no  Name of person committed to bringing portable tank at discharge:  no    Active with HD/PD prior to admission: no  Nephrologist:  no  HD Center:  no    Transition Plan:   return home    Transportation PLAN for Discharge:  no    Factors facilitating achievement of predicted outcomes: none    Barriers to discharge:  none      Additional CM/SW Notes: SW met with pt and pt daughter at bedside. Pt stated her dog caused this. Pt and pt daughter denied any needs at dc. Deneise Essex and/or her family were provided with choice of provider.         [facility]   Care Management Department  Ph:  [phone]  Fax: [phone]

## 2021-04-30 ENCOUNTER — TELEPHONE (OUTPATIENT)
Dept: CARDIOLOGY | Facility: CLINIC | Age: 71
End: 2021-04-30

## 2021-04-30 ENCOUNTER — APPOINTMENT (OUTPATIENT)
Dept: MRI IMAGING | Age: 71
DRG: 580 | End: 2021-04-30
Payer: MEDICARE

## 2021-04-30 LAB
ANION GAP SERPL CALCULATED.3IONS-SCNC: 10 MMOL/L (ref 7–19)
BUN BLDV-MCNC: 15 MG/DL (ref 8–23)
CALCIUM SERPL-MCNC: 8.1 MG/DL (ref 8.8–10.2)
CHLORIDE BLD-SCNC: 109 MMOL/L (ref 98–111)
CO2: 21 MMOL/L (ref 22–29)
CREAT SERPL-MCNC: 1.1 MG/DL (ref 0.5–0.9)
GFR AFRICAN AMERICAN: >59
GFR NON-AFRICAN AMERICAN: 49
GLUCOSE BLD-MCNC: 84 MG/DL (ref 74–109)
HCT VFR BLD CALC: 29.2 % (ref 37–47)
HEMOGLOBIN: 9.1 G/DL (ref 12–16)
INR BLD: 2.62 (ref 0.88–1.18)
MCH RBC QN AUTO: 29.4 PG (ref 27–31)
MCHC RBC AUTO-ENTMCNC: 31.2 G/DL (ref 33–37)
MCV RBC AUTO: 94.5 FL (ref 81–99)
PDW BLD-RTO: 16.3 % (ref 11.5–14.5)
PLATELET # BLD: 153 K/UL (ref 130–400)
PMV BLD AUTO: 11.9 FL (ref 9.4–12.3)
POTASSIUM REFLEX MAGNESIUM: 4.1 MMOL/L (ref 3.5–5)
PROTHROMBIN TIME: 28.7 SEC (ref 12–14.6)
RBC # BLD: 3.09 M/UL (ref 4.2–5.4)
SODIUM BLD-SCNC: 140 MMOL/L (ref 136–145)
WBC # BLD: 9.4 K/UL (ref 4.8–10.8)

## 2021-04-30 PROCEDURE — 2580000003 HC RX 258: Performed by: INTERNAL MEDICINE

## 2021-04-30 PROCEDURE — 99232 SBSQ HOSP IP/OBS MODERATE 35: CPT | Performed by: SURGERY

## 2021-04-30 PROCEDURE — 1210000000 HC MED SURG R&B

## 2021-04-30 PROCEDURE — 80048 BASIC METABOLIC PNL TOTAL CA: CPT

## 2021-04-30 PROCEDURE — 6360000002 HC RX W HCPCS: Performed by: HOSPITALIST

## 2021-04-30 PROCEDURE — 6360000002 HC RX W HCPCS: Performed by: EMERGENCY MEDICINE

## 2021-04-30 PROCEDURE — 6370000000 HC RX 637 (ALT 250 FOR IP): Performed by: HOSPITALIST

## 2021-04-30 PROCEDURE — 6360000002 HC RX W HCPCS: Performed by: INTERNAL MEDICINE

## 2021-04-30 PROCEDURE — 85027 COMPLETE CBC AUTOMATED: CPT

## 2021-04-30 PROCEDURE — 36415 COLL VENOUS BLD VENIPUNCTURE: CPT

## 2021-04-30 PROCEDURE — 94640 AIRWAY INHALATION TREATMENT: CPT

## 2021-04-30 PROCEDURE — 85610 PROTHROMBIN TIME: CPT

## 2021-04-30 PROCEDURE — 2500000003 HC RX 250 WO HCPCS: Performed by: SURGERY

## 2021-04-30 PROCEDURE — 2580000003 HC RX 258: Performed by: HOSPITALIST

## 2021-04-30 PROCEDURE — 73218 MRI UPPER EXTREMITY W/O DYE: CPT

## 2021-04-30 PROCEDURE — 2580000003 HC RX 258: Performed by: EMERGENCY MEDICINE

## 2021-04-30 RX ORDER — MORPHINE SULFATE 4 MG/ML
2 INJECTION, SOLUTION INTRAMUSCULAR; INTRAVENOUS EVERY 4 HOURS PRN
Status: DISCONTINUED | OUTPATIENT
Start: 2021-04-30 | End: 2021-05-01 | Stop reason: HOSPADM

## 2021-04-30 RX ORDER — NIFEDIPINE 60 MG/1
60 TABLET, EXTENDED RELEASE ORAL DAILY
Status: DISCONTINUED | OUTPATIENT
Start: 2021-04-30 | End: 2021-05-01 | Stop reason: HOSPADM

## 2021-04-30 RX ORDER — SODIUM BICARBONATE 650 MG/1
650 TABLET ORAL 2 TIMES DAILY
Status: DISCONTINUED | OUTPATIENT
Start: 2021-04-30 | End: 2021-05-01

## 2021-04-30 RX ORDER — LIDOCAINE HYDROCHLORIDE AND EPINEPHRINE 10; 10 MG/ML; UG/ML
20 INJECTION, SOLUTION INFILTRATION; PERINEURAL ONCE
Status: COMPLETED | OUTPATIENT
Start: 2021-04-30 | End: 2021-04-30

## 2021-04-30 RX ADMIN — ENOXAPARIN SODIUM 40 MG: 40 INJECTION SUBCUTANEOUS at 17:15

## 2021-04-30 RX ADMIN — NIFEDIPINE 60 MG: 60 TABLET, EXTENDED RELEASE ORAL at 16:55

## 2021-04-30 RX ADMIN — GABAPENTIN 100 MG: 100 CAPSULE ORAL at 16:55

## 2021-04-30 RX ADMIN — BACLOFEN 10 MG: 10 TABLET ORAL at 16:55

## 2021-04-30 RX ADMIN — GABAPENTIN 100 MG: 100 CAPSULE ORAL at 20:33

## 2021-04-30 RX ADMIN — ESCITALOPRAM OXALATE 20 MG: 10 TABLET ORAL at 10:42

## 2021-04-30 RX ADMIN — LIDOCAINE HYDROCHLORIDE,EPINEPHRINE BITARTRATE 20 ML: 10; .01 INJECTION, SOLUTION INFILTRATION; PERINEURAL at 10:43

## 2021-04-30 RX ADMIN — FERROUS GLUCONATE TAB 324 MG (37.5 MG ELEMENTAL IRON) 324 MG: 324 (37.5 FE) TAB at 16:55

## 2021-04-30 RX ADMIN — SODIUM CHLORIDE: 9 INJECTION, SOLUTION INTRAVENOUS at 13:15

## 2021-04-30 RX ADMIN — AMPICILLIN SODIUM AND SULBACTAM SODIUM 3000 MG: 2; 1 INJECTION, POWDER, FOR SOLUTION INTRAMUSCULAR; INTRAVENOUS at 13:15

## 2021-04-30 RX ADMIN — PIPERACILLIN AND TAZOBACTAM 3375 MG: 3; .375 INJECTION, POWDER, LYOPHILIZED, FOR SOLUTION INTRAVENOUS at 10:41

## 2021-04-30 RX ADMIN — LISINOPRIL 40 MG: 20 TABLET ORAL at 10:41

## 2021-04-30 RX ADMIN — FERROUS GLUCONATE TAB 324 MG (37.5 MG ELEMENTAL IRON) 324 MG: 324 (37.5 FE) TAB at 10:42

## 2021-04-30 RX ADMIN — CETIRIZINE HYDROCHLORIDE 10 MG: 10 TABLET, FILM COATED ORAL at 10:42

## 2021-04-30 RX ADMIN — IPRATROPIUM BROMIDE 0.5 MG: 0.5 SOLUTION RESPIRATORY (INHALATION) at 06:49

## 2021-04-30 RX ADMIN — THERA TABS 1 TABLET: TAB at 10:42

## 2021-04-30 RX ADMIN — SODIUM BICARBONATE 650 MG: 650 TABLET ORAL at 20:33

## 2021-04-30 RX ADMIN — CALCITRIOL CAPSULES 0.25 MCG 0.25 MCG: 0.25 CAPSULE ORAL at 10:42

## 2021-04-30 RX ADMIN — BACLOFEN 10 MG: 10 TABLET ORAL at 10:42

## 2021-04-30 RX ADMIN — SODIUM BICARBONATE 650 MG: 650 TABLET ORAL at 10:41

## 2021-04-30 RX ADMIN — SODIUM CHLORIDE: 9 INJECTION, SOLUTION INTRAVENOUS at 02:35

## 2021-04-30 RX ADMIN — BACLOFEN 10 MG: 10 TABLET ORAL at 20:33

## 2021-04-30 RX ADMIN — AMPICILLIN SODIUM AND SULBACTAM SODIUM 3000 MG: 2; 1 INJECTION, POWDER, FOR SOLUTION INTRAMUSCULAR; INTRAVENOUS at 20:33

## 2021-04-30 RX ADMIN — IPRATROPIUM BROMIDE 0.5 MG: 0.5 SOLUTION RESPIRATORY (INHALATION) at 14:33

## 2021-04-30 RX ADMIN — GABAPENTIN 100 MG: 100 CAPSULE ORAL at 10:42

## 2021-04-30 RX ADMIN — PIPERACILLIN AND TAZOBACTAM 3375 MG: 3; .375 INJECTION, POWDER, LYOPHILIZED, FOR SOLUTION INTRAVENOUS at 02:33

## 2021-04-30 RX ADMIN — VANCOMYCIN HYDROCHLORIDE 1500 MG: 10 INJECTION, POWDER, LYOPHILIZED, FOR SOLUTION INTRAVENOUS at 16:55

## 2021-04-30 RX ADMIN — IPRATROPIUM BROMIDE 0.5 MG: 0.5 SOLUTION RESPIRATORY (INHALATION) at 18:36

## 2021-04-30 ASSESSMENT — PAIN SCALES - GENERAL: PAINLEVEL_OUTOF10: 10

## 2021-04-30 NOTE — PROGRESS NOTES
Progress Note  Date:2021       Room:0324/324-01  Patient Name:Leny Stone     YOB: 1950     Age:70 y.o. Subjective    Subjective: 69-year-old lady with a complicated past medical history, presented to the hospital from wound care with worsening right forearm swelling and pain post dog bite. Was bitten by her pittbull on , presents to the emergency room, was given oral antibiotics and followed up outpatient with PCP. Patient continued to oral antibiotics, after evaluation by PCP was sent to wound care clinic due to concerns of progression of infection. After evaluation at the wound care was sent to the emergency room for further evaluation. In the ED, concerns of soft tissue infection as well as possible associated abscess. Was given broad-spectrum antibiotics vancomycin and Zosyn admitted for further evaluation. Wound culture currently growing staph aureus, seen by infectious disease doctor recommended continuation of vancomycin as well as Unasyn for now. General surgery also following and plan for I&D today. Seen this morning in her room, no acute overnight event noted. Having generalized body pain secondary to fibromyalgia. Also some associated pain in her right upper extremity. Denied any nausea emesis or chest pain or shortness of breath. Review of Systems: 12 point system reviewed and negative except as stated above. Objective         Vitals Last 24 Hours:  TEMPERATURE:  Temp  Av.4 °F (36.3 °C)  Min: 96.6 °F (35.9 °C)  Max: 97.9 °F (36.6 °C)  RESPIRATIONS RANGE: Resp  Av  Min: 18  Max: 18  PULSE OXIMETRY RANGE: SpO2  Av.6 %  Min: 94 %  Max: 100 %  PULSE RANGE: Pulse  Av.8  Min: 60  Max: 68  BLOOD PRESSURE RANGE: Systolic (79WHK), JCP:155 , Min:121 , XBA:468   ; Diastolic (77MNE), LQY:16, Min:70, Max:98    I/O (24Hr):   No intake or output data in the 24 hours ending 21 1550      Physical Exam  General: Alert, well-developed, no acute distress lying comfortably in bed. HEENT: Atraumatic normocephalic, range of motion normal  Cardiac: Normal S1-S2 no murmurs rub or gallop. Respiratory: Effort normal, breath sounds normal, clear To auscultation bilaterally, no rhonchi or rales, no wheezing  Abdomen: Soft, positive bowel sounds in all quadrants, no distention, nontender to palpation  MSK/extremities: Right forearm wrapped in kerlix dressing, moves all extremities  Skin: Warm  Psych: Affect normal and good eye contact, behavioral normal, thought content normal and judgment normal  Neurological: No focal deficits, alert and conversant, no formal disorientation noted. No sensory deficits, no abnormal coordination. Labs/Imaging/Diagnostics    Labs:  CBC:  Recent Labs     04/29/21  1325 04/30/21  0437   WBC 10.9* 9.4   RBC 3.34* 3.09*   HGB 9.8* 9.1*   HCT 31.3* 29.2*   MCV 93.7 94.5   RDW 16.1* 16.3*    153     CHEMISTRIES:  Recent Labs     04/29/21  1325 04/30/21  0437    140   K 4.1 4.1    109   CO2 24 21*   BUN 14 15   CREATININE 1.1* 1.1*   GLUCOSE 86 84     PT/INR:  Recent Labs     04/29/21 04/29/21  1325 04/30/21  0802   PROTIME  --  30.0* 28.7*   INR 3.80 2.77* 2.62*     APTT:  Recent Labs     04/29/21  1325   APTT 44.8*     LIVER PROFILE:  Recent Labs     04/29/21  1325   AST 24   ALT 11   BILITOT 1.0   ALKPHOS 88       Imaging Last 24 Hours:  Xr Radius Ulna Right (2 Views)    Result Date: 4/29/2021  History: Dog bite infection Right forearm: 2 views right forearm are obtained. There is prominent soft tissue swelling of the lateral right forearm a small amount of subcutaneous air representing the site of the dog bite and soft tissue infection. No fracture or dislocation identified. No underlying osteomyelitis. 1. Prominent soft tissue swelling of the right proximal lateral forearm with a small amount of subcutaneous emphysema representing the site of the dog bite and soft tissue infection.  No underlying bony pathology. Signed by Dr Katie Root on 4/29/2021 12:48 PM    Assessment//Plan           Hospital Problems           Last Modified POA    Soft tissue infection 4/29/2021 Yes        Assessment & Plan    Right forearm soft tissue infection with questionable abscess status post dog bite  Continue  vancomycin and Zosyn switched to Unasyn per ID recommendations. Follow MRI of the arm to rule out associated abscess  IV fluids 100 cc an hour  Infectious disease following  Surgery following for possible I&D; will need wound culture. Hold anticoagulation for now, acute DVT prophylaxis. Wound care consultation.     Hypertension  Continue lisinopril     CKD stage II  Currently stable, avoid nephrotoxic agent.     Hypothyroidism: Continue Synthroid.     GERD: Continue pantoprazole.     History of DVT  Hold full dose anticoagulation for now     Obstructive sleep apnea: Can use home CPAP.     Type 2 diabetes: Diet controlled     Depression: Continue Lexapro        Code: Full  DVT prophylaxis: Enoxaparin  Diet: Renal  Disposition: Pending improvement in above status. Electronically signed by   Agus Arellano   Internal Medicine Hospitalist  On 4/30/2021  At 4:02 PM    EMR Dragon/Transcription disclaimer:   Much of this encounter note is an electronic transcription/translation of spoken language to printed text.  The electronic translation of spoken language may permit erroneous, or at times, nonsensical words or phrases to be inadvertently transcribed; although attempts have made to review the note for such errors, some may still exist.

## 2021-04-30 NOTE — CONSULTS
Palliative Care:  Known to [facility], she has multiple chronic health issues and is followed for support. She was last here in September 2020 with COVID-19. Pt presents to ED from wound care with a dog bite that she has been treating outpt. Surgery consult and plans for I & D. Pt on IV Abx. Support to her and her family. Pt still works and lives at home with daughter. Pt has ACP note on file, reviewed and no changes.     Will follow while in the hospital.    Electronically signed by Rachel Buckner RN on 4/30/2021 at 11:20 AM

## 2021-04-30 NOTE — PROGRESS NOTES
Attempted to see patient bedside for wound evaluation and possible I&D. Patient away for MRI. Will attempt later today or tomorrow.  Will follow up MRI

## 2021-04-30 NOTE — CONSULTS
 Fibromyalgia     Fibromyositis     Gastric ulcer     GERD (gastroesophageal reflux disease)     History of gastric bypass     Hx of lupus anticoagulant disorder     Hypertension     Hypothyroidism     Intermittent claudication (HCC)     Intestinal obstruction (HCC)     Iron deficiency     Left-sided weakness     Low vitamin D level     Lupus (HCC)     Menopause     Obesity     Osteoarthritis     Osteoporosis     Other iron deficiency anemias     Palliative care patient 09/24/2020    Pernicious anemia     PUPP (pruritic urticarial papules and plaques of pregnancy)     Right leg numbness     Right sided sciatica     Sarcoidosis     with liver involvement    Sciatica     Secondary hyperparathyroidism ([address]     Shingles     Shortness of breath     Sleep apnea     Stomach ulcer     Syncope     Visual loss, one eye        Past Surgical History:   Procedure Laterality Date    APPENDECTOMY      CARDIAC CATHETERIZATION  10/21/13  [address]    EF over 60%    CHOLECYSTECTOMY      COLONOSCOPY  02/2010    negative    COLONOSCOPY  02/22/2010    Dr Suyapa Fagan    COLONOSCOPY  04/01/2016    Dr LAKHWINDER Horvath-internal hemorrhoids, 5 yr recall    DILATATION, ESOPHAGUS      EYE SURGERY      Cyst on Right eye    EYE SURGERY      FRACTURE SURGERY      totsl knee replacement    GASTRIC BYPASS SURGERY      GASTRIC BYPASS SURGERY      HERNIA REPAIR      HYSTERECTOMY      Complete    HYSTERECTOMY      Partial - because had a tubal pregnancy.      INCONTINENCE SURGERY      Bladder Sling    OTHER SURGICAL HISTORY      IVC filter    PACEMAKER INSERTION      PACEMAKER PLACEMENT      medtronic    OK TOTAL KNEE ARTHROPLASTY Right 03/26/2018    TOTAL KNEE REPLACEMENT COMPLEX PRIMARY performed by Caroline Vieyra MD at [facility]      SMALL INTESTINE SURGERY      SPLENECTOMY      KRISTEL AND BSO      TONSILLECTOMY AND ADENOIDECTOMY      UPPER GASTROINTESTINAL ENDOSCOPY  12/2011 gerd s/p gastric bypass    UPPER GASTROINTESTINAL ENDOSCOPY  02/2014    normal s/p gastric bypass    UPPER GASTROINTESTINAL ENDOSCOPY  02/2010    biopsy neg Barretts, chronic reflux esophagitis s/p gastric bypass    UPPER GASTROINTESTINAL ENDOSCOPY  07/2006    unremarkable s/p gastric bypass    UPPER GASTROINTESTINAL ENDOSCOPY  08/10/2015    Dr Jennifer Zeng UPPER GASTROINTESTINAL ENDOSCOPY N/A 04/01/2016    Dr. Alie Barr:  H Pylori(-), HH, o/w normal    VENA CAVA FILTER PLACEMENT Right 2003         MEDICATIONS :       Scheduled Meds:   sodium bicarbonate  650 mg Oral BID    vancomycin (VANCOCIN) intermittent dosing (placeholder)   Other RX Placeholder    vancomycin  1,500 mg Intravenous Q24H    baclofen  10 mg Oral TID    calcitRIOL  0.25 mcg Oral Daily    escitalopram  20 mg Oral Daily    ferrous gluconate  324 mg Oral BID    cetirizine  10 mg Oral Daily    gabapentin  100 mg Oral TID    levothyroxine  100 mcg Oral Daily    lisinopril  40 mg Oral Daily    multivitamin  1 tablet Oral Daily    pantoprazole  40 mg Oral QAM AC    sodium chloride flush  5-40 mL Intravenous 2 times per day    enoxaparin  40 mg Subcutaneous Q24H    piperacillin-tazobactam  3,375 mg Intravenous Q6H    ipratropium  0.5 mg Nebulization TID     Continuous Infusions:   sodium chloride 100 mL/hr at 04/30/21 0235    sodium chloride       PRN Meds:morphine, sodium chloride flush, sodium chloride, promethazine **OR** ondansetron, polyethylene glycol, acetaminophen **OR** acetaminophen, magnesium sulfate, potassium chloride **OR** potassium alternative oral replacement **OR** potassium chloride    ALLERGIES:      Insect extract allergy skin test, Lactose intolerance (gi), Prednisone, Zanaflex [tizanidine hcl], Lortab [hydrocodone-acetaminophen], Other, Oxycodone-acetaminophen, and Ultram [tramadol hcl]      SOCIAL HISTORY:     TOBACCO:   reports that she has never smoked.  She has never used smokeless tobacco.     ETOH:   reports no history of alcohol use. Patient currently lives at home      FAMILY HISTORY:         Problem Relation Age of Onset    Uterine Cancer Mother     Cervical Cancer Mother     Coronary Art Dis Mother     Heart Disease Father     Lung Cancer Father     Other Father         renal failure    Colon Cancer Brother     Colon Polyps Brother     Uterine Cancer Maternal Grandmother     Cervical Cancer Maternal Grandmother     Diabetes Maternal Grandmother     Cervical Cancer Sister     Other Sister         fibromyalgia    Diabetes Paternal Aunt     Esophageal Cancer Neg Hx     Liver Cancer Neg Hx     Liver Disease Neg Hx     Stomach Cancer Neg Hx     Rectal Cancer Neg Hx        REVIEW OF SYSTEMS:     Constitutional: no fever, no night sweats  Ears: no hearing difficulty  Mouth/throat: no dysphagia  Lungs: no cough  no shortness of breath  CVS: no palpitation, no chest pain  GI: no abdominal pain  JOHNATHON: no dysuria  Musculoskeletal: Right forearm pain  Endocrine: no polyuria, polydipsia  Hematology: No ongoing bleeding. She did have some oozing of some dark blood from the wound. Dermatology: See HPI     PHYSICAL EXAM:     BP (!) 167/92   Pulse 61   Temp 97.6 °F (36.4 °C) (Temporal)   Resp 18   Ht 5' 7\" (1.702 m)   Wt 244 lb 1.6 oz (110.7 kg)   SpO2 100%   BMI 38.23 kg/m²  Temp (24hrs), Av.4 °F (36.3 °C), Min:96.6 °F (35.9 °C), Max:97.9 °F (36.6 °C)    General:  Awake, alert, not in distress lying in bed. Fish Couch HEENT: Right eye chronically deviated to the right. Facemask in place over nose and mouth  Respiratory: Effort even and unlabored. She is not conversationally dyspneic  Abdomen:  Soft, non tender to palpation, BS positive  Extremities: Right upper extremity-see photo. Dressing removed. There is some erythema more noted medially to the largest puncture site. Area is indurated and tender. Lymph nodes-no enlarged axillary lymphadenopathy appreciated but she did have some tenderness.   Skin: See right upper extremity description above. Improved today when compared to photo from yesterday  CNS: Moves all extremities, speech clear, follows commands  PSYCH: alert, pleasant and cooperative. Document Information    Wound   Right arm reference   04/29/2021 10:23   Attached To: Hospital Encounter on 4/29/21 with CHANELLE Elam CNP         LABS:     CBC with DIFF:   Recent Labs     04/29/21  1325 04/30/21  0437   WBC 10.9* 9.4   RBC 3.34* 3.09*   HGB 9.8* 9.1*   HCT 31.3* 29.2*   MCV 93.7 94.5   MCH 29.3 29.4   MCHC 31.3* 31.2*   RDW 16.1* 16.3*    153   MPV 11.7 11.9   NEUTOPHILPCT 68.3*  --    LYMPHOPCT 19.7*  --    MONOPCT 10.6*  --    EOSRELPCT 0.5  --    BASOPCT 0.3  --    NEUTROABS 7.5  --    LYMPHSABS 2.2  --    MONOSABS 1.20*  --    EOSABS 0.10  --    BASOSABS 0.00  --        CMP/BMP:  Recent Labs     04/29/21  1325 04/30/21  0437    140   K 4.1 4.1    109   CO2 24 21*   ANIONGAP 9 10   GLUCOSE 86 84   BUN 14 15   CREATININE 1.1* 1.1*   LABGLOM 49* 49*   CALCIUM 8.8 8.1*   PROT 7.9  --    LABALBU 3.7  --    BILITOT 1.0  --    ALKPHOS 88  --    ALT 11  --    AST 24  --           Culture:   Recent Labs     04/29/21  1408   ORG Staphylococcus aureus*             IMAGING:   Xr Radius Ulna Right (2 Views)    Result Date: 4/29/2021  History: Dog bite infection Right forearm: 2 views right forearm are obtained. There is prominent soft tissue swelling of the lateral right forearm a small amount of subcutaneous air representing the site of the dog bite and soft tissue infection. No fracture or dislocation identified. No underlying osteomyelitis. 1. Prominent soft tissue swelling of the right proximal lateral forearm with a small amount of subcutaneous emphysema representing the site of the dog bite and soft tissue infection. No underlying bony pathology.  Signed by Dr Rossi Jama on 4/29/2021 12:48 PM          ASSESSMENT AND PLAN     Patient Active Problem List Diagnosis    Vomiting    Burping    GERD (gastroesophageal reflux disease)    History of gastric bypass    Family history of colon cancer    Bloating    Enuresis    Nausea and vomiting    Stable angina ([address]    Encounter for current long-term use of anticoagulants    Primary osteoarthritis of right knee    Arthritis of knee    Essential hypertension    Hyperglycemia    MER (obstructive sleep apnea)    Slow transit constipation    Iron deficiency anemia    Restrictive airway disease    DVT, lower extremity, proximal, acute, unspecified laterality ([address]    H/O systemic lupus erythematosus (SLE) ([address]    Anticoagulated on Coumadin    Burn of abdomen wall, second degree, initial encounter    Postmenopausal osteoporosis    Type II diabetes mellitus with nephropathy ([address]    Pacemaker    History of DVT (deep vein thrombosis)    Lupus anticoagulant disorder ([address]    History of pulmonary embolism    Thrombocytopenia ([address]    Hypothyroidism    Morbidly obese ([address]    Asthmatic bronchitis without complication    Gastroenteritis    Colic    Abdominal pain    Intractable nausea and vomiting    Severe episode of recurrent major depressive disorder, without psychotic features ([address]    COVID-19    Generalized weakness    Accidental fall from bed    Palliative care patient    Lower abdominal pain    Loose stools    Diarrhea    Chronic heartburn    S/P gastric bypass    Dog bite    Cellulitis of right upper extremity    Abscess of right arm    Soft tissue infection   Right upper extremity soft tissue infection secondary to dog bite-patient was seen approximately 1 and half days after the bite in the emergency department. Was placed on appropriate antibiotics for history of dog bite to include Augmentin. Now has superficial wound cultures with Staph aureus.   It could be that she has a secondary staph infection or this could be colonization of the wound and underlying infection still related to the initial dog bite.     We will change Zosyn to Unasyn  Continue vancomycin for now until we get susceptibility data for Staphylococcus aureus  Await findings at bedside I&D per Dr. Fabian Ordaz  Await cultures from I&D that will be obtained after wound appropriately prepped  Elevate right upper extremity above the level of the heart    Discussed with SAWYER Salinas      Thank you Uli Luo MD for allowing me to participate in this patient's care  Electronically signed by Belle Somers MD on 4/30/2021 at 12:14 PM

## 2021-04-30 NOTE — CARE COORDINATION
with family  Steps:  no    Plans to RETURN to current housing: yes  Barriers to RETURNING to current housing:  Wound care to arm    Currently ACTIVE with Home Health CARE:  No- has had [facility] in past   [facility]:      DME Provider:  Has cane and walker and cpap    Has a pulse oximetry unit at home: no    Had [facility] prior to admission:  no  [address]:    Informed of need to bring portable home O2 tank to hospital on day of DISCHARGE:    Name of person committed to bringing portable tank at discharge: Active with HD/PD prior to admission: no  Nephrologist:    HD Center:      Transition Plan:       Transportation PLAN for Discharge:  family    Factors facilitating achievement of predicted outcomes:     Barriers to discharge: Additional CM/SW Notes: spoke with pt re: dc plans and high risk for readmission. Pt has family in the room, (granddaughter?) pt lives with family members- plans on same upon dc. Uses cpap at night only. Has all dme that she needs. Able to self care at home. Pt will be having bedside procedure today, possibly may need abx at home, along with wound care, will cont to follow case. Pt wants [place] upon dc home as well. Has used [facility] in the past.    Electronically signed by Maurice Baer RN on 4/30/2021 at 1:34 PM               \[facility]\"" and/or her family were provided with choice of provider.         Maurice Baer RN  [facility]  Care Management Department  Ph:  [phone]   Fax:

## 2021-04-30 NOTE — TELEPHONE ENCOUNTER
Patient is inpatient at [facility]. Nivia with [facility] called need a device form.    Device: [employer] MRI  Leads: [id number]    Form has been faxed.

## 2021-04-30 NOTE — PROGRESS NOTES
Visited with pt to provide spiritual care. Pt says she is still working. Pt's nurse, Rita says pt is going to have her wound debrided later today. Provided spiritual care with sustaining presence, nurtured hope, and prayer. Pt expressed gratitude for spiritual care.     Electronically signed by Matthias Buerger on 4/30/2021 at 2:51 PM

## 2021-04-30 NOTE — CONSULTS
Orthopaedic Inpatient Consultation    NAME:  Zoya Ocampo   : 1950  MRN: [id number]    2021 12:07 PM    Requesting Physician: Dr. Nika Aviles:  right forearm swelling/pain after dogbite      HISTORY OF PRESENT ILLNESS:   The patient is a 69 y.o. female who was bitten by her own dog 2 weeks ago after she hit it on the head with a spray bottle. Pain is located in the right forearm and rated a 3-5/5, constant, dull, worse with movement, better with rest and medication. There are no associated symptoms. This abscess was drained at the wound care center a day or two ago, but the provider thought hospital admission with IV abx might be indicated.         Past Medical History:        Diagnosis Date    Abdominal pain     Abnormal EKG     Acute sinusitis     Allergic reaction to spider bite     Anemia     Anticoagulated     Anxiety     Arrhythmia     Asthmatic bronchitis without complication     Ataxic gait     Lyons's esophagus     Bowel obstruction (HCC)     Callus     Cardiac pacemaker     CKD (chronic kidney disease) stage 2, GFR 60-89 ml/min     Coat's syndrome     Coat's syndrome     both eyes    Depression     Diabetes mellitus type 2 in nonobese (HCC)     Diabetic nephropathy (HCC)     Disequilibrium     Dizziness     DVT (deep venous thrombosis) (HCC)     Exudative retinopathy     Fibromyalgia     Fibromyositis     Gastric ulcer     GERD (gastroesophageal reflux disease)     History of gastric bypass     Hx of lupus anticoagulant disorder     Hypertension     Hypothyroidism     Intermittent claudication (HCC)     Intestinal obstruction (HCC)     Iron deficiency     Left-sided weakness     Low vitamin D level     Lupus (HCC)     Menopause     Obesity     Osteoarthritis     Osteoporosis     Other iron deficiency anemias     Palliative care patient 2020    Pernicious anemia     PUPP (pruritic urticarial papules and plaques of pregnancy)     Right leg numbness     Right sided sciatica     Sarcoidosis     with liver involvement    Sciatica     Secondary hyperparathyroidism ([address]     Shingles     Shortness of breath     Sleep apnea     Stomach ulcer     Syncope     Visual loss, one eye        Past Surgical History:        Procedure Laterality Date    APPENDECTOMY      CARDI[facility]  [facility]    EF over 60%    CHOLECYSTECTOMY      COLONOSCOPY  02/2010    negative    COLONOSCOPY  02/22/2010    Dr Shwetha Friedman    COLONOSCOPY  04/01/2016    Dr LAKWHINDER Horvath-internal hemorrhoids, 5 yr recall    DILATATION, ESOPHAGUS      EYE SURGERY      Cyst on Right eye    EYE SURGERY      FRACTURE SURGERY      totsl knee replacement    GASTRIC BYPASS SURGERY      GASTRIC BYPASS SURGERY      HERNIA REPAIR      HYSTERECTOMY      Complete    HYSTERECTOMY      Partial - because had a tubal pregnancy.      INCONTINENCE SURGERY      Bladder Sling    OTHER SURGICAL HISTORY      IVC filter    PACEMAKER INSERTION      PACEMAKER PLACEMENT      medtronic    [place] TOTAL KNEE ARTHROPLASTY Right 03/26/2018    TOTAL KNEE REPLACEMENT COMPLEX PRIMARY performed by Jin Palmer MD at [address] [place] [address]      SMALL INTESTINE SURGERY      SPLENECTOMY      KRISTEL AND BSO      TONSILLECTOMY AND ADENOIDECTOMY      UPPER GASTROINTESTINAL ENDOSCOPY  12/2011    gerd s/p gastric bypass    UPPER GASTROINTESTINAL ENDOSCOPY  02/2014    normal s/p gastric bypass    UPPER GASTROINTESTINAL ENDOSCOPY  02/2010    biopsy neg Barretts, chronic reflux esophagitis s/p gastric bypass    UPPER GASTROINTESTINAL ENDOSCOPY  07/2006    unremarkable s/p gastric bypass    UPPER GASTROINTESTINAL ENDOSCOPY  08/10/2015    Dr Juma Tinsley UPPER GASTROINTESTINAL ENDOSCOPY N/A 04/01/2016    Dr. Isaak George:  H Pylori(-), HH, o/w normal    VENA CAVA FILTER PLACEMENT Right 2003       Current Medications:   Prior to Admission medications    Medication Sig Start Date End Date Taking? Authorizing Provider   doxycycline hyclate (VIBRAMYCIN) 100 MG capsule Take 1 capsule by mouth 2 times daily for 10 days 4/29/21 5/9/21  Kina Tate MD   sodium hypochlorite (DAKINS) 0.125 % SOLN external solution Moisten gauze apply to wound twice daily 4/29/21   Disha Pretty, APRN - CNP   CPAP Machine MISC Inhale 1 each into the lungs nightly    Historical Provider, MD   enoxaparin (LOVENOX) 100 MG/ML injection 1ml BID 4/12/21   Jaci He MD   Cyanocobalamin 1000 MCG/ML KIT Inject as directed every 30 days    Historical Provider, MD   gabapentin (NEURONTIN) 100 MG capsule TAKE 1 CAPSULE BY MOUTH THREE TIMES DAILY 3/18/21 6/16/21  Jaci He MD   pantoprazole (PROTONIX) 40 MG tablet TAKE 1 TABLET BY MOUTH TWICE DAILY BEFORE MEAL(S) 3/18/21   Jaci He MD   ferrous gluconate (FERGON) 240 (27 Fe) MG tablet Take 1 tablet by mouth 2 times daily 3/18/21   Jaci He MD   calcitRIOL (ROCALTROL) 0.25 MCG capsule Take 0.25 mcg by mouth daily    Christ Ponce MD   bumetanide (BUMEX) 2 MG tablet Take 1 tablet by mouth daily 3/18/21   Jaci He MD   levothyroxine (SYNTHROID) 100 MCG tablet Take 1 tablet by mouth Daily 3/18/21   Jaci He MD   lisinopril (PRINIVIL;ZESTRIL) 40 MG tablet Take 1 tablet by mouth once daily 3/18/21   Jaci He MD   baclofen (LIORESAL) 10 MG tablet Take 1 tablet by mouth 3 times daily As needed for hip pain 3/18/21   Jaci He MD   escitalopram (LEXAPRO) 20 MG tablet Take 1 tablet by mouth daily 12/14/20   Jaci He MD   fexofenadine (ALLEGRA) 180 MG tablet Take 1 tablet by mouth daily Indications:  Allergic Rhinitis 12/14/20   Jaci He MD   tiotropium (SPIRIVA RESPIMAT) 2.5 MCG/ACT AERS inhaler Inhale 2 puffs into the lungs daily 11/23/20   Jaci He MD   potassium chloride (KLOR-CON) 10 MEQ extended release tablet Take 10 mEq by mouth daily  10/27/20   Historical Provider, MD warfarin (COUMADIN) 7.5 MG tablet Take 2 tablets by mouth daily 2 tablets daily, DO NOT RESUME UNTIL YOU RECHECK YOUR INR ON THURSDAY AND DISCUSS WITH PCP  Patient taking differently: Take 15 mg by mouth daily Indications: stated did not take yesterday 4/28/21as her PT was 3.8 2 tablets daily, DO NOT RESUME UNTIL YOU RECHECK YOUR INR ON THURSDAY AND DISCUSS WITH PCP 9/30/20 4/14/21  [facility], DO   calcipotriene (DOVONEX) 0.005 % cream Use topically as needed 7/7/20   Shahrzad Gillespie MD   clobetasol (TEMOVATE) 0.05 % cream Apply topically 2 times daily. 7/7/20   Shahrzad Gillespie MD   ondansetron (ZOFRAN) 4 MG tablet Take 1 tablet by mouth daily as needed for Nausea or Vomiting 1/31/20   Shahrzad Gillespie MD   Multiple Vitamins-Minerals (CENTRUM ADULTS) TABS Take 1 tablet by mouth daily    Historical Provider, MD   aspirin 81 MG tablet Take 81 mg by mouth daily    Historical Provider, MD       Allergies:  Insect extract allergy skin test, Lactose intolerance (gi), Prednisone, Zanaflex [tizanidine hcl], Lortab [hydrocodone-acetaminophen], Other, Oxycodone-acetaminophen, and Ultram [tramadol hcl]    Social History:   Social History     Socioeconomic History    Marital status:      Spouse name: Not on file    Number of children: 1    Years of education: Not on file    Highest education level: Not on file   Occupational History     Employer: [employer]    Social Needs    Financial resource strain: Not very hard    Food insecurity     Worry: Never true     Inability: Never true    Transportation needs     Medical: No     Non-medical: No   Tobacco Use    Smoking status: Never Smoker    Smokeless tobacco: Never Used   Substance and Sexual Activity    Alcohol use: No    Drug use: No    Sexual activity: Not Currently   Lifestyle    Physical activity     Days per week: 0 days     Minutes per session: 0 min    Stress:  Only a little   Relationships    Social connections     Talks on phone: More than Appears stated age, no distress. Orientation:  Alert and oriented to time, place, and person. Mood and Affect:  Cooperative and pleasant. Gait:  Resting comfortably in bed. Cardiovascular:  Symmetric 1-2 plus pulses in upper and lower extremities. Lymph:  No cervical or inguinal lymphadenopathy noted. Sensation:  Grossly intact to light touch. DTR:  Normal, no pathologic reflexes. Coordination/balance:  Normal    Musculoskeletal:  Right upper extremity exam:  Right forearm is swollen and red. Mild palpable warmth. Mid forearm wound has been packed. Tender to palpation of this area. No active drainage. NVI distally. Left upper extremity exam:  There is no tenderness to palpation about the shoulder, elbow, wrist or hand. Unrestricted full function motion is present. Stability is normal with provocative tests, 5/5 strength, and skin is normal.     Right lower extremity exam:  There is no tenderness to palpation about the hip, knee, ankle or foot. Unrestricted full function motion is present. Stability is normal with provocative tests, 5/5 strength, and skin is normal.     Left lower extremity exam:  There is no tenderness to palpation about the hip, knee, ankle or foot. Unrestricted full function motion is present.   Stability is normal with provocative tests, 5/5 strength, and skin is normal.      DATA:    CBC with Differential:    Lab Results   Component Value Date    WBC 9.4 04/30/2021    RBC 3.09 04/30/2021    HGB 9.1 04/30/2021    HCT 29.2 04/30/2021     04/30/2021    MCV 94.5 04/30/2021    MCH 29.4 04/30/2021    MCHC 31.2 04/30/2021    RDW 16.3 04/30/2021    BANDSPCT 2 09/27/2020    LYMPHOPCT 19.7 04/29/2021    MONOPCT 10.6 04/29/2021    MYELOPCT 2 09/27/2020    BASOPCT 0.3 04/29/2021    MONOSABS 1.20 04/29/2021    LYMPHSABS 2.2 04/29/2021    EOSABS 0.10 04/29/2021    BASOSABS 0.00 04/29/2021     CMP:    Lab Results   Component Value Date     04/30/2021    K 4.1 04/30/2021    CL

## 2021-04-30 NOTE — PLAN OF CARE
Problem: Pain:  Description: Pain management should include both nonpharmacologic and pharmacologic interventions.   Goal: Pain level will decrease  Description: Pain level will decrease  4/30/2021 0402 by Kemar Cosme RN  Outcome: Ongoing  4/29/2021 1808 by Pelon Brush RN  Outcome: Ongoing  Goal: Control of acute pain  Description: Control of acute pain  4/30/2021 0402 by Kemar Cosme RN  Outcome: Ongoing  4/29/2021 1808 by Pelon Brush RN  Outcome: Ongoing  Goal: Control of chronic pain  Description: Control of chronic pain  4/30/2021 0402 by Kemar Cosme RN  Outcome: Ongoing  4/29/2021 1808 by Pelon Brush RN  Outcome: Ongoing     Problem: Nutritional:  Goal: Nutritional status will improve  Description: Nutritional status will improve  4/30/2021 0402 by Kemar Cosme RN  Outcome: Ongoing  4/29/2021 1808 by Pelon Brush RN  Outcome: Ongoing     Problem: Physical Regulation:  Goal: Diagnostic test results will improve  Description: Diagnostic test results will improve  4/30/2021 0402 by Kemar Cosme RN  Outcome: Ongoing  4/29/2021 1808 by Pelon Brush RN  Outcome: Ongoing  Goal: Will remain free from infection  Description: Will remain free from infection  4/30/2021 0402 by Kemar Cosme RN  Outcome: Ongoing  4/29/2021 1808 by Pelon Brush RN  Outcome: Ongoing  Goal: Ability to maintain vital signs within normal range will improve  Description: Ability to maintain vital signs within normal range will improve  4/30/2021 0402 by Kemar Cosme RN  Outcome: Ongoing  4/29/2021 1808 by Pelon Brush RN  Outcome: Ongoing     Problem: Skin Integrity:  Goal: Demonstration of wound healing without infection will improve  Description: Demonstration of wound healing without infection will improve  4/30/2021 0402 by Kemar Cosme RN  Outcome: Ongoing  4/29/2021 1808 by Pelon Brush RN  Outcome: Ongoing  Goal: Complications related to intravenous access or infusion will be avoided or minimized  Description: Complications related to intravenous access or infusion will be avoided or minimized  4/30/2021 0402 by Mary Harmon RN  Outcome: Ongoing  4/29/2021 1808 by Mary Worthington RN  Outcome: Ongoing

## 2021-05-01 VITALS
HEIGHT: 67 IN | OXYGEN SATURATION: 97 % | TEMPERATURE: 97.8 F | WEIGHT: 247.4 LBS | BODY MASS INDEX: 38.83 KG/M2 | RESPIRATION RATE: 18 BRPM | SYSTOLIC BLOOD PRESSURE: 116 MMHG | DIASTOLIC BLOOD PRESSURE: 65 MMHG | HEART RATE: 67 BPM

## 2021-05-01 LAB
ANION GAP SERPL CALCULATED.3IONS-SCNC: 14 MMOL/L (ref 7–19)
BUN BLDV-MCNC: 13 MG/DL (ref 8–23)
CALCIUM SERPL-MCNC: 8.5 MG/DL (ref 8.8–10.2)
CHLORIDE BLD-SCNC: 105 MMOL/L (ref 98–111)
CO2: 19 MMOL/L (ref 22–29)
CREAT SERPL-MCNC: 1 MG/DL (ref 0.5–0.9)
GFR AFRICAN AMERICAN: >59
GFR NON-AFRICAN AMERICAN: 55
GLUCOSE BLD-MCNC: 121 MG/DL (ref 74–109)
GRAM STAIN RESULT: ABNORMAL
HCT VFR BLD CALC: 32.6 % (ref 37–47)
HEMOGLOBIN: 10.3 G/DL (ref 12–16)
MCH RBC QN AUTO: 29.7 PG (ref 27–31)
MCHC RBC AUTO-ENTMCNC: 31.6 G/DL (ref 33–37)
MCV RBC AUTO: 93.9 FL (ref 81–99)
ORGANISM: ABNORMAL
PDW BLD-RTO: 16 % (ref 11.5–14.5)
PLATELET # BLD: 184 K/UL (ref 130–400)
PMV BLD AUTO: 12.1 FL (ref 9.4–12.3)
POTASSIUM REFLEX MAGNESIUM: 4 MMOL/L (ref 3.5–5)
RBC # BLD: 3.47 M/UL (ref 4.2–5.4)
SODIUM BLD-SCNC: 138 MMOL/L (ref 136–145)
WBC # BLD: 10.5 K/UL (ref 4.8–10.8)
WOUND/ABSCESS: ABNORMAL

## 2021-05-01 PROCEDURE — 6360000002 HC RX W HCPCS: Performed by: HOSPITALIST

## 2021-05-01 PROCEDURE — 6370000000 HC RX 637 (ALT 250 FOR IP): Performed by: HOSPITALIST

## 2021-05-01 PROCEDURE — 94640 AIRWAY INHALATION TREATMENT: CPT

## 2021-05-01 PROCEDURE — 2580000003 HC RX 258: Performed by: INTERNAL MEDICINE

## 2021-05-01 PROCEDURE — 36415 COLL VENOUS BLD VENIPUNCTURE: CPT

## 2021-05-01 PROCEDURE — 6360000002 HC RX W HCPCS: Performed by: INTERNAL MEDICINE

## 2021-05-01 PROCEDURE — 2580000003 HC RX 258: Performed by: HOSPITALIST

## 2021-05-01 PROCEDURE — 85027 COMPLETE CBC AUTOMATED: CPT

## 2021-05-01 PROCEDURE — 10061 I&D ABSCESS COMP/MULTIPLE: CPT | Performed by: SURGERY

## 2021-05-01 PROCEDURE — 80048 BASIC METABOLIC PNL TOTAL CA: CPT

## 2021-05-01 RX ORDER — AMOXICILLIN AND CLAVULANATE POTASSIUM 875; 125 MG/1; MG/1
1 TABLET, FILM COATED ORAL 2 TIMES DAILY
Qty: 14 TABLET | Refills: 0 | Status: SHIPPED | OUTPATIENT
Start: 2021-05-01 | End: 2021-05-08

## 2021-05-01 RX ORDER — SODIUM BICARBONATE 650 MG/1
650 TABLET ORAL 3 TIMES DAILY
Status: DISCONTINUED | OUTPATIENT
Start: 2021-05-01 | End: 2021-05-01 | Stop reason: HOSPADM

## 2021-05-01 RX ADMIN — SODIUM CHLORIDE: 9 INJECTION, SOLUTION INTRAVENOUS at 05:11

## 2021-05-01 RX ADMIN — AMPICILLIN SODIUM AND SULBACTAM SODIUM 3000 MG: 2; 1 INJECTION, POWDER, FOR SOLUTION INTRAMUSCULAR; INTRAVENOUS at 10:07

## 2021-05-01 RX ADMIN — BACLOFEN 10 MG: 10 TABLET ORAL at 16:03

## 2021-05-01 RX ADMIN — SODIUM BICARBONATE 650 MG: 650 TABLET ORAL at 10:07

## 2021-05-01 RX ADMIN — LISINOPRIL 40 MG: 20 TABLET ORAL at 10:07

## 2021-05-01 RX ADMIN — THERA TABS 1 TABLET: TAB at 10:07

## 2021-05-01 RX ADMIN — CALCITRIOL CAPSULES 0.25 MCG 0.25 MCG: 0.25 CAPSULE ORAL at 10:07

## 2021-05-01 RX ADMIN — BACLOFEN 10 MG: 10 TABLET ORAL at 10:07

## 2021-05-01 RX ADMIN — LEVOTHYROXINE SODIUM 100 MCG: 50 TABLET ORAL at 05:11

## 2021-05-01 RX ADMIN — GABAPENTIN 100 MG: 100 CAPSULE ORAL at 16:03

## 2021-05-01 RX ADMIN — ESCITALOPRAM OXALATE 20 MG: 10 TABLET ORAL at 10:07

## 2021-05-01 RX ADMIN — IPRATROPIUM BROMIDE 0.5 MG: 0.5 SOLUTION RESPIRATORY (INHALATION) at 06:55

## 2021-05-01 RX ADMIN — GABAPENTIN 100 MG: 100 CAPSULE ORAL at 10:07

## 2021-05-01 RX ADMIN — PANTOPRAZOLE SODIUM 40 MG: 40 TABLET, DELAYED RELEASE ORAL at 05:11

## 2021-05-01 RX ADMIN — NIFEDIPINE 60 MG: 60 TABLET, EXTENDED RELEASE ORAL at 10:07

## 2021-05-01 RX ADMIN — CETIRIZINE HYDROCHLORIDE 10 MG: 10 TABLET, FILM COATED ORAL at 10:07

## 2021-05-01 RX ADMIN — AMPICILLIN SODIUM AND SULBACTAM SODIUM 3000 MG: 2; 1 INJECTION, POWDER, FOR SOLUTION INTRAMUSCULAR; INTRAVENOUS at 16:02

## 2021-05-01 RX ADMIN — IPRATROPIUM BROMIDE 0.5 MG: 0.5 SOLUTION RESPIRATORY (INHALATION) at 15:08

## 2021-05-01 RX ADMIN — AMPICILLIN SODIUM AND SULBACTAM SODIUM 3000 MG: 2; 1 INJECTION, POWDER, FOR SOLUTION INTRAMUSCULAR; INTRAVENOUS at 01:41

## 2021-05-01 RX ADMIN — FERROUS GLUCONATE TAB 324 MG (37.5 MG ELEMENTAL IRON) 324 MG: 324 (37.5 FE) TAB at 10:14

## 2021-05-01 RX ADMIN — SODIUM BICARBONATE 650 MG: 650 TABLET ORAL at 16:03

## 2021-05-01 RX ADMIN — ENOXAPARIN SODIUM 40 MG: 40 INJECTION SUBCUTANEOUS at 16:03

## 2021-05-01 NOTE — PROGRESS NOTES
Spoke with Dr. Jordan Zepeda on the phone regarding Discharge and ATB recommendations. She recommended patient be discharged on Augmentin 875mg BID for seven more days. Follow up with [facility] wound care, and they can contact Dr. Jordan Zepeda if they have on concerns with wound status. Also have patient follow up with PCP. Follow up with Dr. Jordan Zepeda only PRN. Notified Dr. Danny Ardon.     Electronically signed by Luh Nichole RN on 5/1/2021 at 2:52 PM

## 2021-05-01 NOTE — DISCHARGE SUMMARY
Discharge Summary      Date:5/1/2021        Patient Name:Leny Stone     YOB: 1950     Age:70 y.o. Admit Date:4/29/2021   Admission Condition:fair   Discharged Condition:stable  Discharge Date: 05/01/21       Discharge Diagnoses   Active Problems:    Soft tissue infection  Resolved Problems:    * No resolved hospital problems. [facility] Stay   Narrative of Hospital Course:     48-year-old lady with a complicated past medical history, presented to the hospital from wound care with worsening right forearm swelling and pain post dog bite.  Was bitten by her pittbull on April 12, presented to the emergency room the following day, was given oral antibiotics and followed up outpatient with PCP. Patient continued to oral antibiotics, after evaluation by PCP was sent to wound care clinic due to concerns of progression of infection. After evaluation at the wound care was sent to the emergency room for further evaluation.     In the ED, concerns of soft tissue infection as well as possible associated abscess. Was given broad-spectrum antibiotics vancomycin and Zosyn, Wound culture from wound care growing staph aureus, seen by infectious disease doctor recommended continuation of vancomycin as well as Unasyn in house. MRI concerning for developing abscess vs hematoma. Seen by General surgery s/p I&D and evacuation of old blood as well as purulent drainage. ID recc Augmentin 875 bID for 7days and to follow up outpt with PCP and wound care. Physical Exam  General: Alert, well-developed, no acute distress lying comfortably in bed. HEENT: Atraumatic normocephalic, range of motion normal  Cardiac: Normal S1-S2 no murmurs rub or gallop.   Respiratory: Effort normal, breath sounds normal, clear To auscultation bilaterally, no rhonchi or rales, no wheezing  Abdomen: Soft, positive bowel sounds in all quadrants, no distention, nontender to palpation  MSK/extremities: Right forearm wrapped in kerlix dressing, moves all extremities  Skin: Warm  Psych: Affect normal and good eye contact, behavioral normal, thought content normal and judgment normal  Neurological: No focal deficits, alert and conversant, no formal disorientation noted.  No sensory deficits, no abnormal coordination.         Consultants:   PHARMACY TO DOSE VANCOMYCIN  IP CONSULT TO ORTHOPEDIC SURGERY  IP CONSULT TO INFECTIOUS DISEASES  IP CONSULT TO PHARMACY  PALLIATIVE CARE EVAL    Time Spent on Discharge:  35 minutes were spent in patient examination, evaluation, counseling as well as medication reconciliation, prescriptions for required medications, discharge plan and follow up. Surgeries/Procedures Performed:  NONE      Significant Diagnostic Studies:   Recent Labs:  CBC:   Lab Results   Component Value Date    WBC 10.5 05/01/2021    RBC 3.47 05/01/2021    HGB 10.3 05/01/2021    HCT 32.6 05/01/2021    MCV 93.9 05/01/2021    MCH 29.7 05/01/2021    MCHC 31.6 05/01/2021    RDW 16.0 05/01/2021     05/01/2021     BMP:    Lab Results   Component Value Date    GLUCOSE 121 05/01/2021     05/01/2021    K 4.0 05/01/2021     05/01/2021    CO2 19 05/01/2021    ANIONGAP 14 05/01/2021    BUN 13 05/01/2021    CREATININE 1.0 05/01/2021    CALCIUM 8.5 05/01/2021    LABGLOM 55 05/01/2021    GFRAA >59 05/01/2021       Radiology Last 7 Days:  Xr Radius Ulna Right (2 Views)    Result Date: 4/29/2021  1. Prominent soft tissue swelling of the right proximal lateral forearm with a small amount of subcutaneous emphysema representing the site of the dog bite and soft tissue infection. No underlying bony pathology. Signed by Dr Laura Granado on 4/29/2021 12:48 PM    Mri Radius Ulna Right Wo Contrast    Result Date: 4/30/2021  1. Prominent soft tissue edema along the upper forearm with discrete fluid collection along the volar aspect of the upper arm, measuring up to 5.8 cm in long axis by 1.2 cm short axis.  Primary concern given that this was a bite wound is developing abscess with second consideration being a subcutaneous hematoma. This collection abuts the superficial investing muscular fascia of the forearm although there is no evidence of extension deep into the muscular compartment itself. Signed by Dr Enzo Wilkerson on 4/30/2021 4:38 PM      Discharge Plan   Disposition: Home    Provider Follow-Up:   No follow-up provider specified. Patient Instructions   Diet: renal diet, diabetic    Activity: activity as tolerated      Discharge Medications         Medication List      START taking these medications    amoxicillin-clavulanate 875-125 MG per tablet  Commonly known as: AUGMENTIN  Take 1 tablet by mouth 2 times daily for 7 days        CONTINUE taking these medications    aspirin 81 MG tablet     baclofen 10 MG tablet  Commonly known as: LIORESAL  Take 1 tablet by mouth 3 times daily As needed for hip pain     bumetanide 2 MG tablet  Commonly known as: Bumex  Take 1 tablet by mouth daily     calcipotriene 0.005 % cream  Commonly known as: DOVONEX  Use topically as needed     calcitRIOL 0.25 MCG capsule  Commonly known as: ROCALTROL     Centrum Adults Tabs     clobetasol 0.05 % cream  Commonly known as: TEMOVATE  Apply topically 2 times daily. CPAP Machine Misc     Cyanocobalamin 1000 MCG/ML Kit     enoxaparin 100 MG/ML injection  Commonly known as: Lovenox  1ml BID     escitalopram 20 MG tablet  Commonly known as: Lexapro  Take 1 tablet by mouth daily     ferrous gluconate 240 (27 Fe) MG tablet  Commonly known as: Fergon  Take 1 tablet by mouth 2 times daily     fexofenadine 180 MG tablet  Commonly known as: ALLEGRA  Take 1 tablet by mouth daily Indications:  Allergic Rhinitis     gabapentin 100 MG capsule  Commonly known as: NEURONTIN  TAKE 1 CAPSULE BY MOUTH THREE TIMES DAILY     levothyroxine 100 MCG tablet  Commonly known as: SYNTHROID  Take 1 tablet by mouth Daily     lisinopril 40 MG tablet  Commonly known as: PRINIVIL;ZESTRIL  Take 1

## 2021-05-01 NOTE — PROGRESS NOTES
INFECTIOUS DISEASES PROGRESS NOTE    Patient: Torrey Welch  YOB: 1950  MRN: [id number]   Admit date: 4/29/2021   Admitting Physician: Diane Erickson MD  Primary Care Physician: Jesus Manuel Ross MD    CHIEF COMPLAINT: No new complaints      Interval History: General surgery was unable to perform bedside incision and drainage yesterday as when they came to see her patient was gone for MRI. Allergies:    Allergies   Allergen Reactions    Insect Extract Allergy Skin Test Anaphylaxis     Bee stings    Lactose Intolerance (Gi)     Prednisone      Headache and upset stomach    Zanaflex [Tizanidine Hcl]      Passed out lost control of body functions    Lortab [Hydrocodone-Acetaminophen] Nausea And Vomiting     Sweats, weak, nausea and vomiting    Other Nausea And Vomiting     Opiates------sweating, weak        Oxycodone-Acetaminophen Nausea And Vomiting     Sweating and vomiting     Ultram [Tramadol Hcl] Nausea And Vomiting     Sweating, weak, nausea and vomiting         Current Meds: sodium bicarbonate tablet 650 mg, TID  morphine injection 2 mg, Q4H PRN  ampicillin-sulbactam (UNASYN) 3000 mg ivpb minibag, Q6H  NIFEdipine (PROCARDIA XL) extended release tablet 60 mg, Daily  0.9 % sodium chloride infusion, Continuous  baclofen (LIORESAL) tablet 10 mg, TID  calcitRIOL (ROCALTROL) capsule 0.25 mcg, Daily  escitalopram (LEXAPRO) tablet 20 mg, Daily  ferrous gluconate 324 (37.5 Fe) MG tablet 324 mg, BID  cetirizine (ZYRTEC) tablet 10 mg, Daily  gabapentin (NEURONTIN) capsule 100 mg, TID  levothyroxine (SYNTHROID) tablet 100 mcg, Daily  lisinopril (PRINIVIL;ZESTRIL) tablet 40 mg, Daily  multivitamin 1 tablet, Daily  pantoprazole (PROTONIX) tablet 40 mg, QAM AC  sodium chloride flush 0.9 % injection 5-40 mL, 2 times per day  sodium chloride flush 0.9 % injection 5-40 mL, PRN  0.9 % sodium chloride infusion, PRN  enoxaparin (LOVENOX) injection 40 mg, Q24H  promethazine (PHENERGAN) tablet 12.5 mg, Q6H PRN GLUCOSE 86 84 121*   BUN 14 15 13   CREATININE 1.1* 1.1* 1.0*   LABGLOM 49* 49* 55*   CALCIUM 8.8 8.1* 8.5*   PROT 7.9  --   --    LABALBU 3.7  --   --    BILITOT 1.0  --   --    ALKPHOS 88  --   --    ALT 11  --   --    AST 24  --   --          Culture Results:    No results for input(s): CXSURG in the last 720 hours. Blood Culture Recent:   Recent Labs     04/29/21  1325   BC No Growth to date. Any change in status will be called.      04/29/21 14:10   Susceptibility    Staphylococcus aureus (1)    Antibiotic Interpretation ELENA Status   benzylpenicillin Resistant >=0.5 mcg/mL    clindamycin Sensitive 0.25 mcg/mL    erythromycin Sensitive <=0.25 mcg/mL    inducible clindamycin resistance  Neg mcg/mL    moxifloxacin Sensitive <=0.25 mcg/mL    oxacillin Sensitive 0.5 mcg/mL    tetracycline Sensitive <=1 mcg/mL    trimethoprim-sulfamethoxazole Sensitive <=10 mcg/mL    vancomycin Sensitive 1 mcg/mL      Above cultures collected in emergency department      RADIOLOGY      Xr Radius Ulna Right (2 Views)    Result Date: 4/29/2021  History: Dog bite infection Right forearm: 2 views right forearm are obtained. There is prominent soft tissue swelling of the lateral right forearm a small amount of subcutaneous air representing the site of the dog bite and soft tissue infection. No fracture or dislocation identified. No underlying osteomyelitis. 1. Prominent soft tissue swelling of the right proximal lateral forearm with a small amount of subcutaneous emphysema representing the site of the dog bite and soft tissue infection. No underlying bony pathology. Signed by Dr Anni Amato on 4/29/2021 12:48 PM    Mri Radius Ulna Right Wo Contrast    Result Date: 4/30/2021  MRI RADIUS ULNA RIGHT WO CONTRAST 4/30/2021 2:17 PM HISTORY: A dog bit the patient in the R forearm on 4-12-21. ? There is a laceration and swelling. ? No hx of cancer. ? No R forearm surgery to date.  ? COMPARISON: None TECHNIQUE: Multiplanar, multisequential MRI images of the right forearm were obtained without contrast. FINDINGS: Along the radial aspect of the upper forearm, centered within the subcutaneous fat, there is an approximately 5.8 x 3.3 x 1.2 cm fluid collection (series 8-image 12 and series 6-image 14). This fluid collection is heterogeneous and possibly organizing, displaying a very small amount of intermediate T1 intensity debris. The large majority of this collection is hypointense on T1 and heterogeneously hyperintense on T2. This collection does abut the superficial investing muscular fascia along the forearm. There is no evidence of extension deep to the fascia, with the muscular compartments of the forearm appearing unremarkable. There is a moderate amount of infiltrative soft tissue edema identified along the upper forearm, especially along the volar aspect but nearly circumferential. I do not see any other discrete fluid collections. No osseous injury is identified in the upper forearm. T1 marrow signal is normal. The elbow itself is not included within the field-of-view. 1. Prominent soft tissue edema along the upper forearm with discrete fluid collection along the volar aspect of the upper arm, measuring up to 5.8 cm in long axis by 1.2 cm short axis. Primary concern given that this was a bite wound is developing abscess with second consideration being a subcutaneous hematoma. This collection abuts the superficial investing muscular fascia of the forearm although there is no evidence of extension deep into the muscular compartment itself.  Signed by Dr Mary Loyola on 4/30/2021 4:38 PM                  Patient Active Problem List   Diagnosis    Vomiting    Burping    GERD (gastroesophageal reflux disease)    History of gastric bypass    Family history of colon cancer    Bloating    Enuresis    Nausea and vomiting    Stable angina ([facility] [address] [address]    Encounter for current long-term use of anticoagulants    Primary osteoarthritis of right knee  Arthritis of knee    Essential hypertension    Hyperglycemia    MER (obstructive sleep apnea)    Slow transit constipation    Iron deficiency anemia    Restrictive airway disease    DVT, lower extremity, proximal, acute, unspecified laterality ([address]    H/O systemic lupus erythematosus (SLE) ([address]    Anticoagulated on Coumadin    Burn of abdomen wall, second degree, initial encounter    Postmenopausal osteoporosis    Type II diabetes mellitus with nephropathy (HCC)    Pacemaker    History of DVT (deep vein thrombosis)    Lupus anticoagulant disorder (HCC)    History of pulmonary embolism    Thrombocytopenia ([address]    Hypothyroidism    Morbidly obese (HCC)    Asthmatic bronchitis without complication    Gastroenteritis    Colic    Abdominal pain    Intractable nausea and vomiting    Severe episode of recurrent major depressive disorder, without psychotic features ([address]    COVID-19    Generalized weakness    Accidental fall from bed    Palliative care patient    Lower abdominal pain    Loose stools    Diarrhea    Chronic heartburn    S/P gastric bypass    Dog bite    Cellulitis of right upper extremity    Abscess of right arm    Soft tissue infection       IMPRESSION:    Infected right upper extremity secondary to dog bite. Patient is over 2 weeks out from the initial injury. Originally suspected the primary problem would be oral pathogens from the dog however she may have had a secondary staph infection. She did have some spontaneous drainage from the site. She has already had improvement based on photos from wound care and my evaluation yesterday.     RECOMMENDATIONS :  · DC vanco  · Continue unasyn  · Await I and D of RUE - infection may not be all due to MSSA - as this was superficial culture and not typical pathogen for dog bite  · Augmentin p.o. should cover both the MSSA and any pathogens traditionally isolated from dog bite and can be utilized when discharged      Lauren Velarde MD

## 2021-05-01 NOTE — PROCEDURES
Ramon Luna is a 69 y.o. female patient. 1. Wound infection      Past Medical History:   Diagnosis Date    Abdominal pain     Abnormal EKG     Acute sinusitis     Allergic reaction to spider bite     Anemia     Anticoagulated     Anxiety     Arrhythmia     Asthmatic bronchitis without complication 2/79/0128    Ataxic gait     Lyons's esophagus     Bowel obstruction (HCC)     Callus     Cardiac pacemaker     CKD (chronic kidney disease) stage 2, GFR 60-89 ml/min     Coat's syndrome     Coat's syndrome     both eyes    Depression     Diabetes mellitus type 2 in nonobese (HCC)     Diabetic nephropathy (HCC)     Disequilibrium     Dizziness     DVT (deep venous thrombosis) (HCC)     Exudative retinopathy     Fibromyalgia     Fibromyositis     Gastric ulcer     GERD (gastroesophageal reflux disease)     History of gastric bypass     Hx of lupus anticoagulant disorder     Hypertension     Hypothyroidism     Intermittent claudication (HCC)     Intestinal obstruction (HCC)     Iron deficiency     Left-sided weakness     Low vitamin D level     Lupus (HCC)     Menopause     Obesity     Osteoarthritis     Osteoporosis     Other iron deficiency anemias     Palliative care patient 09/24/2020    Pernicious anemia     PUPP (pruritic urticarial papules and plaques of pregnancy)     Right leg numbness     Right sided sciatica     Sarcoidosis     with liver involvement    Sciatica     Secondary hyperparathyroidism (HCC)     Shingles     Shortness of breath     Sleep apnea     Stomach ulcer     Syncope     Visual loss, one eye      Blood pressure 116/65, pulse 67, temperature 97.8 °F (36.6 °C), resp. rate 18, height 5' 7\" (1.702 m), weight 247 lb 6.4 oz (112.2 kg), SpO2 97 %, not currently breastfeeding. Procedures   Incision and Drainage R forearm Abscess    R forearm abscess was identified and prepped with chlorhexidine.  The site was anesthestized using lidocaine/marcaine. Using an 11 blade scalpel, a previous area of wound puncture was extended to 2 cm. Using blunt dissection, the cavity was opened and any loculations opened. There was inferolateral tracking at the7 oclock position to 3 cm, and tracking superiorly by 3cm. In total, the abcess cavity was likely 6x4x3 cm. Old hematoma was evacuated along with a minimal amount of purulent drainage. The cavity was irrigated with normal saline and packed with 1/4inch iodoform. The site was dressed. Patient tolerated the procedure without issue. Daughter at bedside and was taught how to pack the wound. Patient may remove packing in 48 hours and then continue packing daily. May shower daily. Allow soap and water to run over wound. Abx per primary team. May follow up outpatient with wound care center for wound check.     Joeseph Angelucci, DO  5/1/2021

## 2021-05-01 NOTE — PROGRESS NOTES
Progress Note  Date:2021       Room:0324/324-01  Patient Name:Leny Stone     YOB: 1950     Age:70 y.o. Subjective    Subjective: 65-year-old lady with a complicated past medical history, presented to the hospital from wound care with worsening right forearm swelling and pain post dog bite. Was bitten by her pittbull on , presents to the emergency room, was given oral antibiotics and followed up outpatient with PCP. Patient continued to oral antibiotics, after evaluation by PCP was sent to wound care clinic due to concerns of progression of infection. After evaluation at the wound care was sent to the emergency room for further evaluation. In the ED, concerns of soft tissue infection as well as possible associated abscess. Was given broad-spectrum antibiotics vancomycin and Zosyn admitted for further evaluation. Wound culture currently growing staph aureus, seen by infectious disease doctor recommended continuation of vancomycin as well as Unasyn for now. General surgery also following and plan for I&D today. MRI concerning for developing abscess vs hematoma. Seen this morning in her room, no acute overnight event noted. Awaiting I&D. Denied any nausea emesis or chest pain or shortness of breath. Review of Systems: 12 point system reviewed and negative except as stated above. Objective         Vitals Last 24 Hours:  TEMPERATURE:  Temp  Av.1 °F (36.7 °C)  Min: 97.7 °F (36.5 °C)  Max: 98.8 °F (37.1 °C)  RESPIRATIONS RANGE: Resp  Av.5  Min: 18  Max: 20  PULSE OXIMETRY RANGE: SpO2  Av.5 %  Min: 96 %  Max: 97 %  PULSE RANGE: Pulse  Av.5  Min: 67  Max: 79  BLOOD PRESSURE RANGE: Systolic (64KCN), WNE:426 , Min:114 , NDL:393   ; Diastolic (85UML), QON:27, Min:63, Max:81    I/O (24Hr):     Intake/Output Summary (Last 24 hours) at 2021 1245  Last data filed at 2021 1119  Gross per 24 hour   Intake 700 ml   Output 1200 ml   Net -500 ml Physical Exam  General: Alert, well-developed, no acute distress lying comfortably in bed. HEENT: Atraumatic normocephalic, range of motion normal  Cardiac: Normal S1-S2 no murmurs rub or gallop. Respiratory: Effort normal, breath sounds normal, clear To auscultation bilaterally, no rhonchi or rales, no wheezing  Abdomen: Soft, positive bowel sounds in all quadrants, no distention, nontender to palpation  MSK/extremities: Right forearm wrapped in kerlix dressing, moves all extremities  Skin: Warm  Psych: Affect normal and good eye contact, behavioral normal, thought content normal and judgment normal  Neurological: No focal deficits, alert and conversant, no formal disorientation noted. No sensory deficits, no abnormal coordination. Labs/Imaging/Diagnostics    Labs:  CBC:  Recent Labs     04/29/21 1325 04/30/21 0437 05/01/21  0147   WBC 10.9* 9.4 10.5   RBC 3.34* 3.09* 3.47*   HGB 9.8* 9.1* 10.3*   HCT 31.3* 29.2* 32.6*   MCV 93.7 94.5 93.9   RDW 16.1* 16.3* 16.0*    153 184     CHEMISTRIES:  Recent Labs     04/29/21  1325 04/30/21 0437 05/01/21  0147    140 138   K 4.1 4.1 4.0    109 105   CO2 24 21* 19*   BUN 14 15 13   CREATININE 1.1* 1.1* 1.0*   GLUCOSE 86 84 121*     PT/INR:  Recent Labs     04/29/21 04/29/21  1325 04/30/21  0802   PROTIME  --  30.0* 28.7*   INR 3.80 2.77* 2.62*     APTT:  Recent Labs     04/29/21  1325   APTT 44.8*     LIVER PROFILE:  Recent Labs     04/29/21  1325   AST 24   ALT 11   BILITOT 1.0   ALKPHOS 88       Imaging Last 24 Hours:  Xr Radius Ulna Right (2 Views)    Result Date: 4/29/2021  History: Dog bite infection Right forearm: 2 views right forearm are obtained. There is prominent soft tissue swelling of the lateral right forearm a small amount of subcutaneous air representing the site of the dog bite and soft tissue infection. No fracture or dislocation identified. No underlying osteomyelitis.     1. Prominent soft tissue swelling of the right proximal lateral forearm with a small amount of subcutaneous emphysema representing the site of the dog bite and soft tissue infection. No underlying bony pathology. Signed by Dr Harpreet Begum on 4/29/2021 12:48 PM    Assessment//Plan           Hospital Problems           Last Modified POA    Soft tissue infection 4/29/2021 Yes        Assessment & Plan    Right forearm soft tissue infection with questionable abscess status post dog bite  Continue  vancomycin and Unasyn per ID recommendations. MRI of the arm concerning for possible developing abscess vs hematoma  Continue IV fluids 100 cc an hour  Infectious disease following  Surgery following for possible I&D today; will need wound culture. Hold anticoagulation for now     Hypertension  Continue lisinopril and nifedipine     CKD stage II  Currently stable, avoid nephrotoxic agent.     Hypothyroidism: Continue Synthroid.     GERD: Continue pantoprazole.     History of DVT  Hold full dose anticoagulation for now     Obstructive sleep apnea: Can use home CPAP.     Type 2 diabetes: Diet controlled     Depression: Continue Lexapro        Code: Full  DVT prophylaxis: Enoxaparin  Diet: Renal  Disposition: Pending I&D, wound cx and antibioitics regimen outpt      Electronically signed by   Uli Luo   Internal Medicine Hospitalist  On 5/1/2021  At 12:45 PM    EMR Dragon/Transcription disclaimer:   Much of this encounter note is an electronic transcription/translation of spoken language to printed text.  The electronic translation of spoken language may permit erroneous, or at times, nonsensical words or phrases to be inadvertently transcribed; although attempts have made to review the note for such errors, some may still exist.

## 2021-05-01 NOTE — PLAN OF CARE
Problem: Pain:  Description: Pain management should include both nonpharmacologic and pharmacologic interventions.   Goal: Pain level will decrease  Description: Pain level will decrease  Outcome: Ongoing  Goal: Control of acute pain  Description: Control of acute pain  Outcome: Ongoing  Goal: Control of chronic pain  Description: Control of chronic pain  Outcome: Ongoing     Problem: Nutritional:  Goal: Nutritional status will improve  Description: Nutritional status will improve  Outcome: Ongoing     Problem: Physical Regulation:  Goal: Diagnostic test results will improve  Description: Diagnostic test results will improve  Outcome: Ongoing  Goal: Will remain free from infection  Description: Will remain free from infection  Outcome: Ongoing  Goal: Ability to maintain vital signs within normal range will improve  Description: Ability to maintain vital signs within normal range will improve  Outcome: Ongoing     Problem: Skin Integrity:  Goal: Demonstration of wound healing without infection will improve  Description: Demonstration of wound healing without infection will improve  Outcome: Ongoing  Goal: Complications related to intravenous access or infusion will be avoided or minimized  Description: Complications related to intravenous access or infusion will be avoided or minimized  Outcome: Ongoing

## 2021-05-01 NOTE — PROGRESS NOTES
Family at bedside voiced concern/upset that bedside I&D had not been done yet, stating they are ready to go home and this could have been done in the ER and now they've been here for days waiting. Explained that it appeared General surgery tried to come by yesterday but patient was not available (down in MRI). Family voiced she felt they were being given the run around. Contacted general surgery and Dr. Mitchell Velazquez in a procedure, spoke with tech who confirmed bedside procedure planned for today. Notified patient and family of this. Family also concerned that patient takes coumadin and has history of blood clots; concerned that she has been off of this for a prolonged time but also understands need to be off of it for I&D.     Electronically signed by Violette Martínez RN on 5/1/2021 at 11:18 AM

## 2021-05-03 ENCOUNTER — CARE COORDINATION (OUTPATIENT)
Dept: CASE MANAGEMENT | Age: 71
End: 2021-05-03

## 2021-05-04 ENCOUNTER — OFFICE VISIT (OUTPATIENT)
Age: 71
End: 2021-05-04

## 2021-05-04 ENCOUNTER — HOSPITAL ENCOUNTER (OUTPATIENT)
Dept: WOUND CARE | Age: 71
Discharge: HOME | End: 2021-05-04
Payer: MEDICARE

## 2021-05-04 ENCOUNTER — CARE COORDINATION (OUTPATIENT)
Dept: CASE MANAGEMENT | Age: 71
End: 2021-05-04

## 2021-05-04 ENCOUNTER — CARE COORDINATION (OUTPATIENT)
Dept: CARE COORDINATION | Age: 71
End: 2021-05-04

## 2021-05-04 VITALS — HEART RATE: 85 BPM | OXYGEN SATURATION: 98 %

## 2021-05-04 VITALS
HEART RATE: 81 BPM | DIASTOLIC BLOOD PRESSURE: 108 MMHG | SYSTOLIC BLOOD PRESSURE: 171 MMHG | TEMPERATURE: 96.9 F | RESPIRATION RATE: 18 BRPM
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
## 2021-05-04 DIAGNOSIS — Z11.59 SCREENING FOR VIRAL DISEASE: Primary | ICD-10-CM

## 2021-05-04 DIAGNOSIS — E66.01 MORBIDLY OBESE (HCC): ICD-10-CM

## 2021-05-04 DIAGNOSIS — L02.413 ABSCESS OF RIGHT ARM: ICD-10-CM

## 2021-05-04 DIAGNOSIS — L98.492 SKIN ULCER OF UPPER ARM, WITH FAT LAYER EXPOSED (HCC): ICD-10-CM

## 2021-05-04 DIAGNOSIS — L03.113 CELLULITIS OF RIGHT UPPER EXTREMITY: Primary | ICD-10-CM

## 2021-05-04 DIAGNOSIS — E11.21 TYPE II DIABETES MELLITUS WITH NEPHROPATHY (HCC): ICD-10-CM

## 2021-05-04 PROBLEM — R53.1 GENERALIZED WEAKNESS: Status: RESOLVED | Noted: 2020-09-23 | Resolved: 2021-05-04
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
## 2021-05-04 PROBLEM — R19.5 LOOSE STOOLS: Status: RESOLVED | Noted: 2021-04-07 | Resolved: 2021-05-04

## 2021-05-04 PROBLEM — U07.1 COVID-19: Status: RESOLVED | Noted: 2020-09-23 | Resolved: 2021-05-04

## 2021-05-04 PROBLEM — K59.01 SLOW TRANSIT CONSTIPATION: Status: RESOLVED | Noted: 2018-03-26 | Resolved: 2021-05-04

## 2021-05-04 PROBLEM — R19.7 DIARRHEA: Status: RESOLVED | Noted: 2021-04-07 | Resolved: 2021-05-04

## 2021-05-04 PROBLEM — T21.22XA BURN OF ABDOMEN WALL, SECOND DEGREE, INITIAL ENCOUNTER: Status: RESOLVED | Noted: 2018-08-27 | Resolved: 2021-05-04

## 2021-05-04 PROBLEM — Z51.5 PALLIATIVE CARE PATIENT: Status: RESOLVED | Noted: 2020-09-24 | Resolved: 2021-05-04

## 2021-05-04 LAB
BLOOD CULTURE, ROUTINE: NORMAL
CULTURE, BLOOD 2: NORMAL
SARS-COV-2, PCR: NOT DETECTED

## 2021-05-04 PROCEDURE — 11042 DBRDMT SUBQ TIS 1ST 20SQCM/<: CPT | Performed by: NURSE PRACTITIONER

## 2021-05-04 PROCEDURE — 99999 PR OFFICE/OUTPT VISIT,PROCEDURE ONLY: CPT | Performed by: NURSE PRACTITIONER

## 2021-05-04 PROCEDURE — 11042 DBRDMT SUBQ TIS 1ST 20SQCM/<: CPT

## 2021-05-04 ASSESSMENT — PAIN DESCRIPTION - PAIN TYPE: TYPE: ACUTE PAIN

## 2021-05-04 ASSESSMENT — PAIN DESCRIPTION - LOCATION: LOCATION: ARM

## 2021-05-04 ASSESSMENT — PAIN DESCRIPTION - ORIENTATION: ORIENTATION: RIGHT

## 2021-05-04 ASSESSMENT — PAIN SCALES - GENERAL: PAINLEVEL_OUTOF10: 8

## 2021-05-04 NOTE — HOME CARE
[address] [facility]. [address] Shira Chris Slater [address] [phone] [phone]     Ordering Center:     [address] [place] [address],[address]  [address] [facility], [place] [address] [address] [phone]  WOUND CARE Dept: [address] [facility] [phone]    Patient Information:      Gordo Hays  [address]   [phone]   : 1950  AGE: 69 y.o. GENDER: female   EPISODE DATE: 2021    Insurance:      PRIMARY INSURANCE:  Plan: MEDICARE PART A AND B  Coverage: MEDICARE  Effective Date: 2015  Group Number: [unfilled]  Subscriber Number: [id number] - (Medicare)    Payor/Plan Subscr  Sex Relation Sub. Ins. ID Effective Group Num   1. MEDICARE - ME* AHMET HANNON 1950 Female Self [id number] 1/1/15                                    [address]    2. COLONIAL KAYLA* AHMET HANNON 1950 Female Self [id number] [address]                                   [address]        Patient Wound Information:      Problem List Items Addressed This Visit     Abscess of right arm          WOUNDS REQUIRING DRESSING SUPPLIES:     Wound 18 Burn Abdomen Quadrant; Left Wound 1 - Left lower quadrant - Burn (Active)   Number of days: 980       Wound 21 Arm Proximal;Right wound #1 right forearm cluster (r/t dog bite 2021) (Active)   Wound Image   21 0941   Wound Etiology Traumatic 21 09   Dressing Status Clean;Dry; Intact 21 0941   Wound Cleansed Soap and water 21 0941   Dressing/Treatment Gauze dressing/dressing sponge;Roll gauze;Tape/Soft cloth adhesive tape 21 6069   Wound Length (cm) 1.8 cm 21 09   Wound Width (cm) 0.7 cm 21 09   Wound Depth (cm) 0.7 cm 21 09   Wound Surface Area (cm^2) 1.26 cm^2 21 09   Change in Wound Size % (l*w) 53.33 21   Wound Volume (cm^3) 0.88 cm^3 21   Wound Healing % -226 21   Distance Tunneling (cm) 0 cm 05/04/21 0941   Tunneling Position ___ O'Clock 0 05/04/21 0941   Undermining Starts ___ O'Clock 0 05/04/21 0941   Undermining Ends___ O'Clock 0 05/04/21 0941   Undermining Maxium Distance (cm) 0 05/04/21 0941   Wound Assessment Pink/red;Slough 05/04/21 0941   Drainage Amount Small 05/04/21 0941   Drainage Description Serosanguinous 05/04/21 0941   Odor None 05/04/21 0941   Jeanne-wound Assessment Induration 05/04/21 0941   Margins Defined edges 05/04/21 0941   Wound Thickness Description not for Pressure Injury Full thickness 05/04/21 0941   Number of days: 5          Supplies Requested :      WOUND #: 1   PRIMARY DRESSING:  None   Cover and Secure with:  Other absorbent bandage, 4' mahad     FREQUENCY OF DRESSING CHANGES:  Daily       ADDITIONAL ITEMS:  [] Gloves Small  [x] Gloves Medium [] Gloves Large [] Gloves Benjamin Riser  [] Tape 1\" [x] Tape 2\" [] Tape 3\"  [x] Medipore Tape  [] Saline  [] Skin Prep   [] Adhesive Remover   [] Cotton Tip Applicators   [] Other:    Patient Wound(s) Debrided: [x] Yes if yes please add date 5/4/21   [] No    Debribement Type: Excisional/Sharp and Mechanical     Patient currently being seen by Home Health: [] Yes   [x] No    Duration for needed supplies:  []15  [x]30  []60  []90 Days    Electronically signed by CHANELLE Morales CNP on 5/4/2021 at 11:51 AM     Provider Information:      PROVIDER'S NAME: Yuko Ray CHANELLE    NPI: [id number]

## 2021-05-04 NOTE — PROGRESS NOTES
[address]   Progress Note and Procedure Note      Mena Grady  MEDICAL RECORD NUMBER:  [id number]  AGE: 69 y.o. GENDER: female  : 1950  EPISODE DATE:  2021    Subjective:     Chief Complaint   Patient presents with    Wound Check     wound         HISTORY of PRESENT ILLNESS HPI     Mena Grady is a 69 y.o. female who presents today for wound/ulcer evaluation.    History of Wound Context: right arm wound  Ulcer Identification:  Ulcer Type: surgical  Contributing Factors: diabetes and malnutrition    Wound: Surgical incision        PAST MEDICAL HISTORY        Diagnosis Date    Abdominal pain     Abnormal EKG     Acute sinusitis     Allergic reaction to spider bite     Anemia     Anticoagulated     Anxiety     Arrhythmia     Asthmatic bronchitis without complication 3/26/0627    Ataxic gait     Lyons's esophagus     Bowel obstruction (HCC)     Burn of abdomen wall, second degree, initial encounter 2018    Callus     Cardiac pacemaker     CKD (chronic kidney disease) stage 2, GFR 60-89 ml/min     Coat's syndrome     Coat's syndrome     both eyes    Depression     Diabetes mellitus type 2 in nonobese (HCC)     Diabetic nephropathy (HCC)     Disequilibrium     Dizziness     DVT (deep venous thrombosis) (HCC)     Exudative retinopathy     Fibromyalgia     Fibromyositis     Gastric ulcer     GERD (gastroesophageal reflux disease)     History of gastric bypass     Hx of lupus anticoagulant disorder     Hypertension     Hypothyroidism     Intermittent claudication (HCC)     Intestinal obstruction (HCC)     Iron deficiency     Left-sided weakness     Low vitamin D level     Lupus (HCC)     Menopause     Obesity     Osteoarthritis     Osteoporosis     Other iron deficiency anemias     Palliative care patient 2020    Pernicious anemia     PUPP (pruritic urticarial papules and plaques of pregnancy)     Right leg numbness  Right sided sciatica     Sarcoidosis     with liver involvement    Sciatica     Secondary hyperparathyroidism (HCC)     Shingles     Shortness of breath     Sleep apnea     Stomach ulcer     Syncope     Visual loss, one eye        PAST SURGICAL HISTORY    Past Surgical History:   Procedure Laterality Date    APPENDECTOMY      CARDIAC CATHETERIZATION  10/21/13  [facility]    EF over 60%    CHOLECYSTECTOMY      COLONOSCOPY  02/2010    negative    COLONOSCOPY  02/22/2010    Dr Marisabel Gomez    COLONOSCOPY  04/01/2016    Dr ALKHWINDER Horvath-internal hemorrhoids, 5 yr recall    DILATATION, ESOPHAGUS      EYE SURGERY      Cyst on Right eye    EYE SURGERY      FRACTURE SURGERY      totsl knee replacement    GASTRIC BYPASS SURGERY      GASTRIC BYPASS SURGERY      HERNIA REPAIR      HYSTERECTOMY      Complete    HYSTERECTOMY      Partial - because had a tubal pregnancy.      INCONTINENCE SURGERY      Bladder Sling    OTHER SURGICAL HISTORY      IVC filter    PACEMAKER INSERTION      PACEMAKER PLACEMENT      medtronic    [place] TOTAL KNEE ARTHROPLASTY Right 03/26/2018    TOTAL KNEE REPLACEMENT COMPLEX PRIMARY performed by Henri Albright [facility] at [facility] [facility] SMALL INTESTINE SURGERY      SPLENECTOMY      KRISTEL AND BSO      TONSILLECTOMY AND ADENOIDECTOMY      UPPER GASTROINTESTINAL ENDOSCOPY  12/2011    gerd s/p gastric bypass    UPPER GASTROINTESTINAL ENDOSCOPY  02/2014    normal s/p gastric bypass    UPPER GASTROINTESTINAL ENDOSCOPY  02/2010    biopsy neg Barretts, chronic reflux esophagitis s/p gastric bypass    UPPER GASTROINTESTINAL ENDOSCOPY  07/2006    unremarkable s/p gastric bypass    UPPER GASTROINTESTINAL ENDOSCOPY  08/10/2015    Dr Marcelino Cancer UPPER GASTROINTESTINAL ENDOSCOPY N/A 04/01/2016    Dr. Hermann Kong:  H Pylori(-), HH, o/w normal    VENA CAVA FILTER PLACEMENT Right 2003       FAMILY HISTORY    Family History   Problem Relation Age of Onset    Uterine Cancer Mother    Konrad Romo Cervical Cancer Mother     Coronary Art Dis Mother     Heart Disease Father     Lung Cancer Father     Other Father         renal failure    Colon Cancer Brother     Colon Polyps Brother     Uterine Cancer Maternal Grandmother     Cervical Cancer Maternal Grandmother     Diabetes Maternal Grandmother     Cervical Cancer Sister     Other Sister         fibromyalgia    Diabetes Paternal Aunt     Esophageal Cancer Neg Hx     Liver Cancer Neg Hx     Liver Disease Neg Hx     Stomach Cancer Neg Hx     Rectal Cancer Neg Hx        SOCIAL HISTORY    Social History     Tobacco Use    Smoking status: Never Smoker    Smokeless tobacco: Never Used   Substance Use Topics    Alcohol use: No    Drug use: No       ALLERGIES    Allergies   Allergen Reactions    Insect Extract Allergy Skin Test Anaphylaxis     Bee stings    Lactose Intolerance (Gi)     Prednisone      Headache and upset stomach    Zanaflex [Tizanidine Hcl]      Passed out lost control of body functions    Lortab [Hydrocodone-Acetaminophen] Nausea And Vomiting     Sweats, weak, nausea and vomiting    Other Nausea And Vomiting     Opiates------sweating, weak        Oxycodone-Acetaminophen Nausea And Vomiting     Sweating and vomiting     Ultram [Tramadol Hcl] Nausea And Vomiting     Sweating, weak, nausea and vomiting         MEDICATIONS    Current Outpatient Medications on File Prior to Encounter   Medication Sig Dispense Refill    amoxicillin-clavulanate (AUGMENTIN) 875-125 MG per tablet Take 1 tablet by mouth 2 times daily for 7 days 14 tablet 0    CPAP Machine MISC Inhale 1 each into the lungs nightly      enoxaparin (LOVENOX) 100 MG/ML injection 1ml BID 10 Syringe 0    Cyanocobalamin 1000 MCG/ML KIT Inject as directed every 30 days      gabapentin (NEURONTIN) 100 MG capsule TAKE 1 CAPSULE BY MOUTH THREE TIMES DAILY 270 capsule 0    pantoprazole (PROTONIX) 40 MG tablet TAKE 1 TABLET BY MOUTH TWICE DAILY BEFORE MEAL(S) 30 tablet 0  ferrous gluconate (FERGON) 240 (27 Fe) MG tablet Take 1 tablet by mouth 2 times daily 180 tablet 2    calcitRIOL (ROCALTROL) 0.25 MCG capsule Take 0.25 mcg by mouth daily      bumetanide (BUMEX) 2 MG tablet Take 1 tablet by mouth daily 30 tablet 5    levothyroxine (SYNTHROID) 100 MCG tablet Take 1 tablet by mouth Daily 90 tablet 3    lisinopril (PRINIVIL;ZESTRIL) 40 MG tablet Take 1 tablet by mouth once daily 90 tablet 1    baclofen (LIORESAL) 10 MG tablet Take 1 tablet by mouth 3 times daily As needed for hip pain 10 tablet 1    escitalopram (LEXAPRO) 20 MG tablet Take 1 tablet by mouth daily 90 tablet 3    fexofenadine (ALLEGRA) 180 MG tablet Take 1 tablet by mouth daily Indications: Allergic Rhinitis 90 tablet 3    tiotropium (SPIRIVA RESPIMAT) 2.5 MCG/ACT AERS inhaler Inhale 2 puffs into the lungs daily 1 Inhaler 5    potassium chloride (KLOR-CON) 10 MEQ extended release tablet Take 10 mEq by mouth daily       calcipotriene (DOVONEX) 0.005 % cream Use topically as needed 3 Tube 3    clobetasol (TEMOVATE) 0.05 % cream Apply topically 2 times daily.  3 Tube 3    ondansetron (ZOFRAN) 4 MG tablet Take 1 tablet by mouth daily as needed for Nausea or Vomiting 30 tablet 0    Multiple Vitamins-Minerals (CENTRUM ADULTS) TABS Take 1 tablet by mouth daily      aspirin 81 MG tablet Take 81 mg by mouth daily      sodium hypochlorite (DAKINS) 0.125 % SOLN external solution Moisten gauze apply to wound twice daily 1000 mL 2    warfarin (COUMADIN) 7.5 MG tablet Take 2 tablets by mouth daily 2 tablets daily, DO NOT RESUME UNTIL YOU RECHECK YOUR INR ON THURSDAY AND DISCUSS WITH PCP (Patient taking differently: Take 15 mg by mouth daily Indications: stated did not take yesterday 4/28/21as her PT was 3.8 2 tablets daily, DO NOT RESUME UNTIL YOU RECHECK YOUR INR ON THURSDAY AND DISCUSS WITH PCP) 60 tablet 0     Current Facility-Administered Medications on File Prior to Encounter   Medication Dose Route Frequency laterality ([address] I82.4Y9    H/O systemic lupus erythematosus (SLE) ([address] M32.9    Anticoagulated on Coumadin Z79.01    Postmenopausal osteoporosis M81.0    Type II diabetes mellitus with nephropathy (HCC) E11.21    Pacemaker Z95.0    History of DVT (deep vein thrombosis) Z86.718    Lupus anticoagulant disorder (HCC) D68.62    History of pulmonary embolism Z86.711    Thrombocytopenia (HCC) D69.6    Hypothyroidism E03.9    Morbidly obese ([facility]) E66.01    Asthmatic bronchitis without complication A37.416    Gastroenteritis Y05.6    Colic A81.36    Abdominal pain R10.9    Intractable nausea and vomiting R11.2    Severe episode of recurrent major depressive disorder, without psychotic features ([address] F33.2    Lower abdominal pain R10.30    Chronic heartburn R12    S/P gastric bypass Z98.84    Dog bite W54. 0XXA    Cellulitis of right upper extremity L03. 113    Abscess of right arm L02.413    Soft tissue infection L08.9    Skin ulcer of upper arm, with fat layer exposed ([address] L98.492        Procedure Note  Indications:  Based on my examination of this patient's wound(s)/ulcer(s) today, debridement is required to promote healing and evaluate the wound base. Performed by: CHANELLE Morales CNP    Consent obtained:  Yes    Time out taken:  Yes    Pain Control: Anesthetic  Anesthetic: 2% Lidocaine Gel Topical       Debridement:Excisional Debridement    Using curette and forceps the wound(s)/ulcer(s) was/were sharply debrided down through and including the removal of epidermis, dermis and subcutaneous tissue.         Devitalized Tissue Debrided:  fibrin, biofilm, slough and exudate    Pre Debridement Measurements:  Are located in the Wound/Ulcer Documentation Flow Sheet    Wound/Ulcer #: 1    Post Debridement Measurements:  Wound/Ulcer Descriptions are Pre Debridement except measurements:      Percent of Wound/Ulcer Debrided: 100%    Total Surface Area Debrided:  1.26 sq cm     Wound 08/27/18 Burn Abdomen Quadrant; Left Wound 1 - Left lower quadrant - Burn (Active)   Number of days: 980       Wound 04/29/21 Arm Proximal;Right wound #1 right forearm cluster (r/t dog bite 4/12/2021) (Active)   Wound Image   05/04/21 0941   Wound Etiology Traumatic 05/04/21 0941   Dressing Status Clean;Dry; Intact 05/04/21 0941   Wound Cleansed Soap and water 05/04/21 0941   Dressing/Treatment Gauze dressing/dressing sponge;Roll gauze;Tape/Soft cloth adhesive tape 04/30/21 2325   Wound Length (cm) 1.8 cm 05/04/21 0941   Wound Width (cm) 0.7 cm 05/04/21 0941   Wound Depth (cm) 0.7 cm 05/04/21 0941   Wound Surface Area (cm^2) 1.26 cm^2 05/04/21 0941   Change in Wound Size % (l*w) 53.33 05/04/21 0941   Wound Volume (cm^3) 0.88 cm^3 05/04/21 0941   Wound Healing % -226 05/04/21 0941   Post-Procedure Length (cm) 1.8 cm 05/04/21 1215   Post-Procedure Width (cm) 0.7 cm 05/04/21 1215   Post-Procedure Depth (cm) 0.7 cm 05/04/21 1215   Post-Procedure Surface Area (cm^2) 1.26 cm^2 05/04/21 1215   Post-Procedure Volume (cm^3) 0.88 cm^3 05/04/21 1215   Distance Tunneling (cm) 0 cm 05/04/21 0941   Tunneling Position ___ O'Clock 0 05/04/21 0941   Undermining Starts ___ O'Clock 7 05/04/21 0941   Undermining Ends___ O'Clock 3 05/04/21 0941   Undermining Maxium Distance (cm) 3.8 05/04/21 0941   Wound Assessment Pink/red;Slough 05/04/21 0941   Drainage Amount Small 05/04/21 0941   Drainage Description Serosanguinous 05/04/21 0941   Odor None 05/04/21 0941   Jeanne-wound Assessment Induration 05/04/21 0941   Margins Defined edges 05/04/21 0941   Wound Thickness Description not for Pressure Injury Full thickness 05/04/21 0941   Number of days: 5            Diabetic/Pressure/Non Pressure Ulcers only:  Ulcer: N/A      Estimated Blood Loss:  Minimal    Hemostasis Achieved:  by pressure    Procedural Pain:  0  / 10     Post Procedural Pain:  0 / 10     Response to treatment:  Well tolerated by patient.        Plan:     Problem List Items Addressed This Visit     Type II diabetes mellitus with nephropathy ([facility]    Morbidly obese (HCC)    Dog bite    Cellulitis of right upper extremity - Primary    Abscess of right arm    * (Principal) Skin ulcer of upper arm, with fat layer exposed ([address]          Treatment Note please see attached Discharge Instructions    In my professional opinion this patient would benefit from HBO Therapy: No    Written patient dismissal instructions given to patient and signed by patient or POA. Ms. Heather Hanna received IV antibiotics and her incision was opened up larger than my initial opening to remove the hematoma. The surgeon opted against surgical unroofing therefore there is a tunneling and undermining present. Her granddaughter was present today and was able to give further instructions for wound care. The patient does not eat well according to the granddaughter so additional education and supplemental nutritional tip were discussed.   Electronically signed by CHANELLE Elam CNP on 5/4/2021 at 12:19 PM

## 2021-05-04 NOTE — CARE COORDINATION
Ambulatory Care Coordination Note  5/4/2021  CM Risk Score: 10  Charlson 10 Year Mortality Risk Score: 100%     ACC: Jesse Stanford, RN    Summary Note: ACM did not reach Inez Gardner today, left a voicemail. I spoke to patient's daughter, Vinay Jacques. Inez Gardner is back at work this week and is currently at her job. Her daughter is assisting her with her dressing changes at home to right arm wound. Vinay Jacques stated the wound looks much better but patient is having some discomfort. She is taking her antibiotic. Pt has weekly wound care appts for follow up on healing. Vinay Jacques stated they will receive dressing supplies to the home as well for daily changes. ACM did not review BS numbers with [facility] (Pt's A1c in March was 5.6). Plan:  ACM encouraged that if patient's pain is not well controlled, contact PCP for treatment options. ACM asked permission to call 1 week and f/u on patient with Vinay Jacques and she agreed - stated this ACM may call any time. Care Coordination Interventions    Program Enrollment: Complex Care  Referral from Primary Care Provider: No  Suggested Interventions and Community Resources  Zone Management Tools: In Process (Comment: 5/4: Dtr/grddtr assisting pt w dsg changes, she is back at work. Dtr stated some pain but otherwise doing well. Advised if pain poory controlled, contact PCP.)         Goals Addressed                 This Visit's Progress     Patient Stated (pt-stated)   No change     I need someone to do my taxes    Barriers: Unknown community resource options  Plan for overcoming my barriers:   Pt partnering with [facility] to find local resource to prepare her income tax return  Pt willing to ask daughter for agency information  Confidence: 9/10  Anticipated Goal Completion Date: 5/14/21            Prior to Admission medications    Medication Sig Start Date End Date Taking?  Authorizing Provider   amoxicillin-clavulanate (AUGMENTIN) 875-125 MG per tablet Take 1 tablet by mouth 2 times daily for 7 days 5/1/21 5/8/21 Yes Fabienne Nassar MD   sodium hypochlorite (DAKINS) 0.125 % SOLN external solution Moisten gauze apply to wound twice daily 4/29/21  Yes CHANELLE Richards - CNP   CPAP Machine MISC Inhale 1 each into the lungs nightly   Yes Historical Provider, MD   enoxaparin (LOVENOX) 100 MG/ML injection 1ml BID 4/12/21  Yes Clarence Wilkinson MD   Cyanocobalamin 1000 MCG/ML KIT Inject as directed every 30 days   Yes Historical Provider, MD   gabapentin (NEURONTIN) 100 MG capsule TAKE 1 CAPSULE BY MOUTH THREE TIMES DAILY 3/18/21 6/16/21 Yes Clarence Wilkinson MD   pantoprazole (PROTONIX) 40 MG tablet TAKE 1 TABLET BY MOUTH TWICE DAILY BEFORE MEAL(S) 3/18/21  Yes Clarence Wilkinson MD   ferrous gluconate (FERGON) 240 (27 Fe) MG tablet Take 1 tablet by mouth 2 times daily 3/18/21  Yes Clarence Wilkinson MD   calcitRIOL (ROCALTROL) 0.25 MCG capsule Take 0.25 mcg by mouth daily   Yes Collin Peguero MD   bumetanide (BUMEX) 2 MG tablet Take 1 tablet by mouth daily 3/18/21  Yes Clarence Wilkinson MD   levothyroxine (SYNTHROID) 100 MCG tablet Take 1 tablet by mouth Daily 3/18/21  Yes Clarence Wilkinson MD   lisinopril (PRINIVIL;ZESTRIL) 40 MG tablet Take 1 tablet by mouth once daily 3/18/21  Yes Clarence Wilkinson MD   baclofen (LIORESAL) 10 MG tablet Take 1 tablet by mouth 3 times daily As needed for hip pain 3/18/21  Yes Clarence Wilkinson MD   escitalopram (LEXAPRO) 20 MG tablet Take 1 tablet by mouth daily 12/14/20  Yes Clarence Wilkinson MD   fexofenadine (ALLEGRA) 180 MG tablet Take 1 tablet by mouth daily Indications:  Allergic Rhinitis 12/14/20  Yes Clarence Wilkinson MD   tiotropium (SPIRIVA RESPIMAT) 2.5 MCG/ACT AERS inhaler Inhale 2 puffs into the lungs daily 11/23/20  Yes Clarence Wilkinson MD   potassium chloride (KLOR-CON) 10 MEQ extended release tablet Take 10 mEq by mouth daily  10/27/20  Yes Historical Provider, MD   warfarin (COUMADIN) 7.5 MG tablet Take 2 tablets by mouth daily 2 tablets daily, DO NOT RESUME UNTIL YOU RECHECK YOUR INR ON THURSDAY AND DISCUSS WITH PCP  Patient taking differently: Take 15 mg by mouth daily Indications: stated did not take yesterday 4/28/21as her PT was 3.8 2 tablets daily, DO NOT RESUME UNTIL YOU RECHECK YOUR INR ON THURSDAY AND DISCUSS WITH PCP 9/30/20 5/4/21 Yes Debbie Perea DO   calcipotriene (DOVONEX) 0.005 % cream Use topically as needed 7/7/20  Yes Claudean Fey, MD   clobetasol (TEMOVATE) 0.05 % cream Apply topically 2 times daily.  7/7/20  Yes Claudean Fey, MD   ondansetron (ZOFRAN) 4 MG tablet Take 1 tablet by mouth daily as needed for Nausea or Vomiting 1/31/20  Yes Claudean Fey, MD   Multiple Vitamins-Minerals (CENTRUM ADULTS) TABS Take 1 tablet by mouth daily   Yes Historical Provider, MD   aspirin 81 MG tablet Take 81 mg by mouth daily   Yes Historical Provider, MD       Future Appointments   Date Time Provider Jose Dunni   5/6/2021  8:30 AM Claudean Fey, MD [facility]   5/13/2021 10:15 AM CHANELLE Morgan CNP [facility] LMP [address]   6/1/2021 11:15 AM [facility] DEXA RM 1 [facility] WOMENS [facility]   6/1/2021 11:40 AM [facility] MAMMO RM 2 [facility] WOMENS [facility]   6/24/2021  9:40 AM SCHEDULE, [facility] MED ONC MA [facility] MED ONC [facility]   6/24/2021 10:00 [facility]   7/7/2021  9:45 AM Claudean Fey, MD LPS [facility]

## 2021-05-04 NOTE — PLAN OF CARE
Problem: Wound:  Goal: Signs of wound healing will improve  Description: Signs of wound healing will improve  Outcome: Ongoing

## 2021-05-06 ENCOUNTER — ANTI-COAG VISIT (OUTPATIENT)
Dept: INTERNAL MEDICINE | Age: 71
End: 2021-05-06

## 2021-05-06 ENCOUNTER — OFFICE VISIT (OUTPATIENT)
Dept: INTERNAL MEDICINE | Age: 71
End: 2021-05-06
Payer: MEDICARE

## 2021-05-06 ENCOUNTER — TELEPHONE (OUTPATIENT)
Dept: INTERNAL MEDICINE | Age: 71
End: 2021-05-06

## 2021-05-06 VITALS
SYSTOLIC BLOOD PRESSURE: 158 MMHG | HEART RATE: 71 BPM | BODY MASS INDEX: 37.95 KG/M2 | OXYGEN SATURATION: 99 % | WEIGHT: 241.8 LBS | RESPIRATION RATE: 20 BRPM | DIASTOLIC BLOOD PRESSURE: 98 MMHG | HEIGHT: 67 IN

## 2021-05-06 DIAGNOSIS — I10 ESSENTIAL HYPERTENSION: ICD-10-CM

## 2021-05-06 DIAGNOSIS — Z79.01 ANTICOAGULATED ON COUMADIN: ICD-10-CM
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
## 2021-05-06 DIAGNOSIS — Z79.01 ANTICOAGULATED: ICD-10-CM

## 2021-05-06 DIAGNOSIS — N76.1 SUBACUTE VAGINITIS: ICD-10-CM
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
## 2021-05-06 DIAGNOSIS — E11.65 UNCONTROLLED TYPE 2 DIABETES MELLITUS WITH HYPERGLYCEMIA (HCC): ICD-10-CM

## 2021-05-06 LAB
ALBUMIN SERPL-MCNC: 3.8 G/DL (ref 3.5–5.2)
ALP BLD-CCNC: 80 U/L (ref 35–104)
ALT SERPL-CCNC: 11 U/L (ref 5–33)
ANION GAP SERPL CALCULATED.3IONS-SCNC: 9 MMOL/L (ref 7–19)
AST SERPL-CCNC: 22 U/L (ref 5–32)
BASOPHILS ABSOLUTE: 0.1 K/UL (ref 0–0.2)
BASOPHILS RELATIVE PERCENT: 1.1 % (ref 0–1)
BILIRUB SERPL-MCNC: 0.3 MG/DL (ref 0.2–1.2)
BUN BLDV-MCNC: 19 MG/DL (ref 8–23)
CALCIUM SERPL-MCNC: 8.9 MG/DL (ref 8.8–10.2)
CHLORIDE BLD-SCNC: 107 MMOL/L (ref 98–111)
CO2: 24 MMOL/L (ref 22–29)
CREAT SERPL-MCNC: 1 MG/DL (ref 0.5–0.9)
EOSINOPHILS ABSOLUTE: 0.2 K/UL (ref 0–0.6)
EOSINOPHILS RELATIVE PERCENT: 2.7 % (ref 0–5)
GFR AFRICAN AMERICAN: >59
GFR NON-AFRICAN AMERICAN: 55
GLUCOSE BLD-MCNC: 89 MG/DL (ref 74–109)
HCT VFR BLD CALC: 32.7 % (ref 37–47)
HEMOGLOBIN: 9.8 G/DL (ref 12–16)
IMMATURE GRANULOCYTES #: 0 K/UL
INR BLD: 1.11 (ref 0.88–1.18)
LYMPHOCYTES ABSOLUTE: 1.8 K/UL (ref 1.1–4.5)
LYMPHOCYTES RELATIVE PERCENT: 31.8 % (ref 20–40)
MCH RBC QN AUTO: 28.8 PG (ref 27–31)
MCHC RBC AUTO-ENTMCNC: 30 G/DL (ref 33–37)
MCV RBC AUTO: 96.2 FL (ref 81–99)
MONOCYTES ABSOLUTE: 0.6 K/UL (ref 0–0.9)
MONOCYTES RELATIVE PERCENT: 10.8 % (ref 0–10)
NEUTROPHILS ABSOLUTE: 2.9 K/UL (ref 1.5–7.5)
NEUTROPHILS RELATIVE PERCENT: 52.9 % (ref 50–65)
PDW BLD-RTO: 15.9 % (ref 11.5–14.5)
PLATELET # BLD: 234 K/UL (ref 130–400)
PMV BLD AUTO: 12 FL (ref 9.4–12.3)
POTASSIUM SERPL-SCNC: 4.4 MMOL/L (ref 3.5–5)
PROTHROMBIN TIME: 14.3 SEC (ref 12–14.6)
RBC # BLD: 3.4 M/UL (ref 4.2–5.4)
SODIUM BLD-SCNC: 140 MMOL/L (ref 136–145)
TOTAL PROTEIN: 8.3 G/DL (ref 6.6–8.7)
WBC # BLD: 5.5 K/UL (ref 4.8–10.8)

## 2021-05-06 PROCEDURE — 99496 TRANSJ CARE MGMT HIGH F2F 7D: CPT | Performed by: INTERNAL MEDICINE

## 2021-05-06 PROCEDURE — 1111F DSCHRG MED/CURRENT MED MERGE: CPT | Performed by: INTERNAL MEDICINE

## 2021-05-06 PROCEDURE — 93793 ANTICOAG MGMT PT WARFARIN: CPT | Performed by: INTERNAL MEDICINE

## 2021-05-06 RX ORDER — FLUCONAZOLE 100 MG/1
100 TABLET ORAL DAILY
Qty: 3 TABLET | Refills: 0 | Status: SHIPPED | OUTPATIENT
Start: 2021-05-06 | End: 2021-05-09

## 2021-05-06 RX ORDER — TRAMADOL HYDROCHLORIDE 50 MG/1
50 TABLET ORAL EVERY 6 HOURS PRN
Qty: 12 TABLET | Refills: 0 | Status: ON HOLD | OUTPATIENT
Start: 2021-05-06 | End: 2021-05-07 | Stop reason: SINTOL

## 2021-05-06 NOTE — PROGRESS NOTES
PATIENT IS CURRENTLY HOLDING WARFARIN FOR A PROCEDURE ON 5/7/21. BRIDGING WITH LOVENOX. Electronically signed by Annalisa Su MD on 5/7/2021 at 7:53 AM    I have reviewed nursing plan for Coumadin management and agree with plan.

## 2021-05-06 NOTE — PATIENT INSTRUCTIONS
Patient Education        Animal Bites: Care Instructions  Your Care Instructions  After an animal bite, the biggest concern is infection. The chance of infection depends on the type of animal that bit you, where on your body you were bitten, and your general health. Many animal bites are not closed with stitches, because this can increase the chance of infection. Your bite may take as little as 7 days or as long as several months to heal, depending on how bad it is. Taking good care of your wound at home will help it heal and reduce your chance of infection. The doctor has checked you carefully, but problems can develop later. If you notice any problems or new symptoms, get medical treatment right away. Follow-up care is a key part of your treatment and safety. Be sure to make and go to all appointments, and call your doctor if you are having problems. It's also a good idea to know your test results and keep a list of the medicines you take. How can you care for yourself at home? · If your doctor told you how to care for your wound, follow your doctor's instructions. If you did not get instructions, follow this general advice:  ? After 24 to 48 hours, gently wash the wound with clean water 2 times a day. Do not scrub or soak the wound. Don't use hydrogen peroxide or alcohol, which can slow healing. ? You may cover the wound with a thin layer of petroleum jelly, such as Vaseline, and a nonstick bandage. ? Apply more petroleum jelly and replace the bandage as needed. · After you shower, gently dry the wound with a clean towel. · If your doctor has closed the wound, cover the bandage with a plastic bag before you take a shower. · A small amount of skin redness and swelling around the wound edges and the stitches or staples is normal. Your wound may itch or feel irritated. Do not scratch or rub the wound.   · Ask your doctor if you can take an over-the-counter pain medicine, such as acetaminophen (Tylenol), ibuprofen (Advil, Motrin), or naproxen (Aleve). Read and follow all instructions on the label. · Do not take two or more pain medicines at the same time unless the doctor told you to. Many pain medicines have acetaminophen, which is Tylenol. Too much acetaminophen (Tylenol) can be harmful. · If your bite puts you at risk for rabies, you will get a series of shots over the next few weeks to prevent rabies. Your doctor will tell you when to get the shots. It is very important that you get the full cycle of shots. Follow your doctor's instructions exactly. · You may need a tetanus shot if you have not received one in the last 5 years. · If your doctor prescribed antibiotics, take them as directed. Do not stop taking them just because you feel better. You need to take the full course of antibiotics. When should you call for help? Call your doctor now or seek immediate medical care if:    · The skin near the bite turns cold or pale or it changes color.     · You lose feeling in the area near the bite, or it feels numb or tingly.     · You have trouble moving a limb near the bite.     · You have symptoms of infection, such as:  ? Increased pain, swelling, warmth, or redness near the wound. ? Red streaks leading from the wound. ? Pus draining from the wound. ? A fever.     · Blood soaks through the bandage. Oozing small amounts of blood is normal.     · Your pain is getting worse. Watch closely for changes in your health, and be sure to contact your doctor if you are not getting better as expected. Where can you learn more? Go to https://[employer]ortex[employer]. org and sign in to your [email or web address] account. Enter T940 in the PollGround box to learn more about \"Animal Bites: Care Instructions. \"     If you do not have an account, please click on the \"Sign Up Now\" link. Current as of: February 26, 2020               Content Version: 12.8  © 8857-3799 [employer]wise, [employer].    Care instructions adapted under license by [facility] [place] ([facility]). If you have questions about a medical condition or this instruction, always ask your healthcare professional. [address] any warranty or liability for your use of this information.

## 2021-05-06 NOTE — PROGRESS NOTES
Reactions    Insect Extract Allergy Skin Test Anaphylaxis     Bee stings    Lactose Intolerance (Gi)     Prednisone      Headache and upset stomach    Zanaflex [Tizanidine Hcl]      Passed out lost control of body functions    Lortab [Hydrocodone-Acetaminophen] Nausea And Vomiting     Sweats, weak, nausea and vomiting    Other Nausea And Vomiting     Opiates------sweating, weak        Oxycodone-Acetaminophen Nausea And Vomiting     Sweating and vomiting     Ultram [Tramadol Hcl] Nausea And Vomiting     Sweating, weak, nausea and vomiting         Med[facility] the [facility]\"   Home Medication Instructions SHAKIR:    Printed on:05/07/21 7424   Medication Information                      amoxicillin-clavulanate (AUGMENTIN) 875-125 MG per tablet  Take 1 tablet by mouth 2 times daily for 7 days             aspirin 81 MG tablet  Take 81 mg by mouth daily             baclofen (LIORESAL) 10 MG tablet  Take 1 tablet by mouth 3 times daily As needed for hip pain             bumetanide (BUMEX) 2 MG tablet  Take 1 tablet by mouth daily             calcipotriene (DOVONEX) 0.005 % cream  Use topically as needed             calcitRIOL (ROCALTROL) 0.25 MCG capsule  Take 0.25 mcg by mouth daily             clobetasol (TEMOVATE) 0.05 % cream  Apply topically 2 times daily. CPAP Machine MISC  Inhale 1 each into the lungs nightly             Cyanocobalamin 1000 MCG/ML KIT  Inject as directed every 30 days             enoxaparin (LOVENOX) 100 MG/ML injection  1ml BID             escitalopram (LEXAPRO) 20 MG tablet  Take 1 tablet by mouth daily             ferrous gluconate (FERGON) 240 (27 Fe) MG tablet  Take 1 tablet by mouth 2 times daily             fexofenadine (ALLEGRA) 180 MG tablet  Take 1 tablet by mouth daily Indications:  Allergic Rhinitis             fluconazole (DIFLUCAN) 100 MG tablet  Take 1 tablet by mouth daily for 3 days gabapentin (NEURONTIN) 100 MG capsule  TAKE 1 CAPSULE BY MOUTH THREE TIMES DAILY             levothyroxine (SYNTHROID) 100 MCG tablet  Take 1 tablet by mouth Daily             lisinopril (PRINIVIL;ZESTRIL) 40 MG tablet  Take 1 tablet by mouth once daily             Multiple Vitamins-Minerals (CENTRUM ADULTS) TABS  Take 1 tablet by mouth daily             ondansetron (ZOFRAN) 4 MG tablet  Take 1 tablet by mouth daily as needed for Nausea or Vomiting             pantoprazole (PROTONIX) 40 MG tablet  TAKE 1 TABLET BY MOUTH TWICE DAILY BEFORE MEAL(S)             potassium chloride (KLOR-CON) 10 MEQ extended release tablet  Take 10 mEq by mouth daily              sodium hypochlorite (DAKINS) 0.125 % SOLN external solution  Moisten gauze apply to wound twice daily             tiotropium (SPIRIVA RESPIMAT) 2.5 MCG/ACT AERS inhaler  Inhale 2 puffs into the lungs daily             traMADol (ULTRAM) 50 MG tablet  Take 1 tablet by mouth every 6 hours as needed for Pain for up to 3 days. Intended supply: 3 days. Take lowest dose possible to manage pain             warfarin (COUMADIN) 7.5 MG tablet  Take 2 tablets by mouth daily 2 tablets daily, DO NOT RESUME UNTIL YOU RECHECK YOUR INR ON THURSDAY AND DISCUSS WITH PCP                   Medications marked \"taking\" at this time  Outpatient Medications Marked as Taking for the 5/6/21 encounter (Office Visit) with Miracle Recinos MD   Medication Sig Dispense Refill    fluconazole (DIFLUCAN) 100 MG tablet Take 1 tablet by mouth daily for 3 days 3 tablet 0    traMADol (ULTRAM) 50 MG tablet Take 1 tablet by mouth every 6 hours as needed for Pain for up to 3 days. Intended supply: 3 days.  Take lowest dose possible to manage pain 12 tablet 0    amoxicillin-clavulanate (AUGMENTIN) 875-125 MG per tablet Take 1 tablet by mouth 2 times daily for 7 days 14 tablet 0    sodium hypochlorite (DAKINS) 0.125 % SOLN external solution Moisten gauze apply to wound twice daily 1000 mL 2    CPAP Machine MISC Inhale 1 each into the lungs nightly      enoxaparin (LOVENOX) 100 MG/ML injection 1ml BID 10 Syringe 0    Cyanocobalamin 1000 MCG/ML KIT Inject as directed every 30 days      gabapentin (NEURONTIN) 100 MG capsule TAKE 1 CAPSULE BY MOUTH THREE TIMES DAILY 270 capsule 0    pantoprazole (PROTONIX) 40 MG tablet TAKE 1 TABLET BY MOUTH TWICE DAILY BEFORE MEAL(S) 30 tablet 0    ferrous gluconate (FERGON) 240 (27 Fe) MG tablet Take 1 tablet by mouth 2 times daily 180 tablet 2    calcitRIOL (ROCALTROL) 0.25 MCG capsule Take 0.25 mcg by mouth daily      bumetanide (BUMEX) 2 MG tablet Take 1 tablet by mouth daily 30 tablet 5    levothyroxine (SYNTHROID) 100 MCG tablet Take 1 tablet by mouth Daily 90 tablet 3    lisinopril (PRINIVIL;ZESTRIL) 40 MG tablet Take 1 tablet by mouth once daily 90 tablet 1    baclofen (LIORESAL) 10 MG tablet Take 1 tablet by mouth 3 times daily As needed for hip pain 10 tablet 1    escitalopram (LEXAPRO) 20 MG tablet Take 1 tablet by mouth daily 90 tablet 3    fexofenadine (ALLEGRA) 180 MG tablet Take 1 tablet by mouth daily Indications: Allergic Rhinitis 90 tablet 3    tiotropium (SPIRIVA RESPIMAT) 2.5 MCG/ACT AERS inhaler Inhale 2 puffs into the lungs daily 1 Inhaler 5    potassium chloride (KLOR-CON) 10 MEQ extended release tablet Take 10 mEq by mouth daily       calcipotriene (DOVONEX) 0.005 % cream Use topically as needed 3 Tube 3    clobetasol (TEMOVATE) 0.05 % cream Apply topically 2 times daily. 3 Tube 3    ondansetron (ZOFRAN) 4 MG tablet Take 1 tablet by mouth daily as needed for Nausea or Vomiting 30 tablet 0    Multiple Vitamins-Minerals (CENTRUM ADULTS) TABS Take 1 tablet by mouth daily          Medications patient taking as of now reconciled against medications ordered at time of hospital discharge: Yes    Chief Complaint   Patient presents with    Follow-Up from Hospital     Patient was admitted to [facility] [place] recently for a dog bite.  She changes the bandages twice a day. She is requesting something for the pain. HPI    Inpatient course: Discharge summary reviewed- see chart. Interval history/Current status: Ms. Lefty Aleman is a 69-year-old woman who presents to the office today for hospital follow-up after sustaining a bite by a pit bull to her right upper extremity. She was bitten by the pit bull on April 12, presented to the emergency room the following day. She was given oral antibiotics and followed up with one of my partners. She continued oral antibiotics and was sent to the wound care clinic for further concerns of worsening infection. After evaluation at wound care, she was sent to the emergency room for further evaluation. In the emergency room, there were concerns of soft tissue infection as well as a possible associated abscess. She was provided with broad-spectrum antibiotics including vancomycin and Zosyn. Wound culture from the wound grew out staph aureus, and she was seen by infectious disease. She was placed on vancomycin and Unasyn while hospitalized. An MRI was performed. This was concerning for developing abscess versus hematoma. General surgery was consulted and she underwent I&D and evacuation of an old blood clot as well as purulent drainage. She was discharged, Augmentin 875 mg p.o. twice daily for 7 days. She is following up with wound care. The wound is packed. She does not need the wound dressing changed today. She also thinks that she has a yeast infection. She does complain of some pain at the site and is requesting something for pain. She has multiple allergies to pain medications. She is willing to try tramadol. Her blood pressure is elevated today. However, she forgot to take her blood pressure medication. Review of Systems   Constitutional: Positive for fatigue. Negative for activity change, appetite change, chills, diaphoresis, fever and unexpected weight change.    HENT: Negative for congestion, ear pain, hearing loss, nosebleeds, postnasal drip, rhinorrhea, sinus pressure, sinus pain, sneezing, sore throat, tinnitus, trouble swallowing and voice change. Eyes: Negative for discharge and itching. Watery eyes   Respiratory: Positive for shortness of breath. Negative for apnea, cough, chest tightness, wheezing and stridor. Improving, but she did have a recent COVID-19 infection. Cardiovascular: Positive for leg swelling. Negative for chest pain and palpitations. Left leg swelling; chronic secondary to DVT. Gastrointestinal: Negative for abdominal distention, abdominal pain, blood in stool, constipation, diarrhea, nausea and vomiting. Endocrine: Negative for cold intolerance, heat intolerance, polydipsia, polyphagia and polyuria. Genitourinary: Negative for difficulty urinating, dysuria, flank pain, frequency, hematuria and urgency. Musculoskeletal: Positive for arthralgias and back pain. Negative for gait problem, joint swelling, myalgias, neck pain and neck stiffness. She had bilateral knee pain. Right hip pain   Skin: Positive for wound. Negative for color change, pallor and rash. Wound to right upper extremity. Allergic/Immunologic: Positive for environmental allergies. Negative for food allergies and immunocompromised state. Neurological: Negative for dizziness, tremors, seizures, syncope, facial asymmetry, speech difficulty, weakness, light-headedness, numbness and headaches. Hematological: Negative for adenopathy. Does not bruise/bleed easily. Psychiatric/Behavioral: Positive for dysphoric mood. Negative for agitation, confusion, decreased concentration and hallucinations. The patient is not nervous/anxious and is not hyperactive.         Vitals:    05/06/21 0852 05/06/21 0856   BP: (!) 172/100 (!) 158/98   Site: Left Upper Arm Left Upper Arm   Position: Sitting Sitting   Pulse: 71    Resp: 20    SpO2: 99%    Weight: 241 lb 12.8 oz (109.7 kg) Palpations: Abdomen is soft. There is no mass. Tenderness: There is no abdominal tenderness. There is no right CVA tenderness, left CVA tenderness, guarding or rebound. Hernia: No hernia is present. Musculoskeletal: Normal range of motion. General: No swelling, tenderness, deformity or signs of injury. Lumbar back: She exhibits pain and spasm. She exhibits normal range of motion, no bony tenderness and no swelling. Back:       Right lower leg: No edema. Left lower leg: No edema. Comments:  Does have some hip pain on palpation of the left hip    Visual inspection:  Deformity/amputation: absent  Skin lesions/pre-ulcerative calluses: present to bottom of the left foot  Edema: right- negative, left- negative    Sensory exam:  Monofilament sensation: normal  (minimum of 5 random plantar locations tested, avoiding callused areas - > 1 area with absence of sensation is + for neuropathy)    Plus at least one of the following:  Pulses: normal,   Pinprick: Intact  Proprioception: N/A  Vibration (128 Hz): N/A   Lymphadenopathy:      Cervical: No cervical adenopathy. Skin:     General: Skin is warm and dry. Capillary Refill: Capillary refill takes less than 2 seconds. Coloration: Skin is not jaundiced or pale. Findings: No bruising, erythema, lesion or rash. Comments: Wound to right upper extremity appears to be healing without any evidence of further infection. There is packing in place. The wound dressing was changed by nursing staff today. Neurological:      General: No focal deficit present. Mental Status: She is alert and oriented to person, place, and time. Mental status is at baseline. Cranial Nerves: No cranial nerve deficit. Sensory: No sensory deficit. Motor: No weakness or abnormal muscle tone.       Coordination: Coordination normal.      Gait: Gait normal.      Deep Tendon Reflexes: Reflexes normal.   Psychiatric:         Mood and Affect: Mood normal. Mood is not anxious or depressed. Behavior: Behavior normal.         Thought Content: Thought content normal.         Judgment: Judgment normal.             Assessment/Plan:    27-year-old woman here for evaluation after being admitted to the hospital for dog bite. 1.  Status post dog bite: Continue antibiotics as prescribed. Wound dressing changed today. CBC and CMP ordered. 2.  Vaginitis: Diflucan prescribed    3. Hypertension: Blood pressure medications have not yet been taken today. 4.  Type 2 diabetes: Patient states her blood sugars are running about 120.    5. Hypercoagulability: Patient on Coumadin therapy. Check PT/INR.         Medical Decision Making: high complexity

## 2021-05-07 ENCOUNTER — APPOINTMENT (OUTPATIENT)
Dept: GENERAL RADIOLOGY | Age: 71
End: 2021-05-07
Attending: INTERNAL MEDICINE
Payer: MEDICARE

## 2021-05-07 ENCOUNTER — HOSPITAL ENCOUNTER (OUTPATIENT)
Age: 71
Setting detail: OUTPATIENT SURGERY
Discharge: HOME | End: 2021-05-07
Attending: INTERNAL MEDICINE | Admitting: INTERNAL MEDICINE
Payer: MEDICARE

## 2021-05-07 ENCOUNTER — ANESTHESIA EVENT (OUTPATIENT)
Dept: ENDOSCOPY | Age: 71
End: 2021-05-07
Payer: MEDICARE

## 2021-05-07 ENCOUNTER — ANESTHESIA (OUTPATIENT)
Dept: ENDOSCOPY | Age: 71
End: 2021-05-07
Payer: MEDICARE

## 2021-05-07 VITALS
TEMPERATURE: 97 F | HEIGHT: 67 IN | DIASTOLIC BLOOD PRESSURE: 90 MMHG | WEIGHT: 241 LBS | SYSTOLIC BLOOD PRESSURE: 166 MMHG | HEART RATE: 66 BPM | OXYGEN SATURATION: 100 % | RESPIRATION RATE: 18 BRPM | BODY MASS INDEX: 37.83 KG/M2

## 2021-05-07 VITALS — SYSTOLIC BLOOD PRESSURE: 182 MMHG | OXYGEN SATURATION: 100 % | TEMPERATURE: 97 F | DIASTOLIC BLOOD PRESSURE: 95 MMHG

## 2021-05-07 DIAGNOSIS — Q43.8 TORTUOUS COLON: Primary | ICD-10-CM

## 2021-05-07 LAB
GLUCOSE BLD-MCNC: 83 MG/DL (ref 70–99)
PERFORMED ON: NORMAL

## 2021-05-07 PROCEDURE — 6360000002 HC RX W HCPCS

## 2021-05-07 PROCEDURE — 2580000003 HC RX 258: Performed by: INTERNAL MEDICINE

## 2021-05-07 PROCEDURE — 2709999900 HC NON-CHARGEABLE SUPPLY: Performed by: INTERNAL MEDICINE

## 2021-05-07 PROCEDURE — 2500000003 HC RX 250 WO HCPCS

## 2021-05-07 PROCEDURE — 3700000001 HC ADD 15 MINUTES (ANESTHESIA): Performed by: INTERNAL MEDICINE

## 2021-05-07 PROCEDURE — 7100000011 HC PHASE II RECOVERY - ADDTL 15 MIN: Performed by: INTERNAL MEDICINE

## 2021-05-07 PROCEDURE — 43239 EGD BIOPSY SINGLE/MULTIPLE: CPT | Performed by: INTERNAL MEDICINE

## 2021-05-07 PROCEDURE — 3700000000 HC ANESTHESIA ATTENDED CARE: Performed by: INTERNAL MEDICINE

## 2021-05-07 PROCEDURE — 74270 X-RAY XM COLON 1CNTRST STD: CPT

## 2021-05-07 PROCEDURE — 82947 ASSAY GLUCOSE BLOOD QUANT: CPT

## 2021-05-07 PROCEDURE — 3609010300 HC COLONOSCOPY W/BIOPSY SINGLE/MULTIPLE: Performed by: INTERNAL MEDICINE

## 2021-05-07 PROCEDURE — 7100000010 HC PHASE II RECOVERY - FIRST 15 MIN: Performed by: INTERNAL MEDICINE

## 2021-05-07 PROCEDURE — 45380 COLONOSCOPY AND BIOPSY: CPT | Performed by: INTERNAL MEDICINE

## 2021-05-07 PROCEDURE — 88342 IMHCHEM/IMCYTCHM 1ST ANTB: CPT

## 2021-05-07 PROCEDURE — 3609012400 HC EGD TRANSORAL BIOPSY SINGLE/MULTIPLE: Performed by: INTERNAL MEDICINE

## 2021-05-07 PROCEDURE — 88305 TISSUE EXAM BY PATHOLOGIST: CPT

## 2021-05-07 RX ORDER — SODIUM CHLORIDE, SODIUM LACTATE, POTASSIUM CHLORIDE, CALCIUM CHLORIDE 600; 310; 30; 20 MG/100ML; MG/100ML; MG/100ML; MG/100ML
INJECTION, SOLUTION INTRAVENOUS CONTINUOUS
Status: DISCONTINUED | OUTPATIENT
Start: 2021-05-07 | End: 2021-05-07 | Stop reason: HOSPADM

## 2021-05-07 RX ORDER — LIDOCAINE HYDROCHLORIDE 10 MG/ML
INJECTION, SOLUTION EPIDURAL; INFILTRATION; INTRACAUDAL; PERINEURAL PRN
Status: DISCONTINUED | OUTPATIENT
Start: 2021-05-07 | End: 2021-05-07 | Stop reason: SDUPTHER

## 2021-05-07 RX ORDER — PROPOFOL 10 MG/ML
INJECTION, EMULSION INTRAVENOUS PRN
Status: DISCONTINUED | OUTPATIENT
Start: 2021-05-07 | End: 2021-05-07 | Stop reason: SDUPTHER

## 2021-05-07 RX ADMIN — PROPOFOL 400 MG: 10 INJECTION, EMULSION INTRAVENOUS at 08:31

## 2021-05-07 RX ADMIN — LIDOCAINE HYDROCHLORIDE 3 ML: 10 INJECTION, SOLUTION EPIDURAL; INFILTRATION; INTRACAUDAL; PERINEURAL at 08:31

## 2021-05-07 RX ADMIN — SODIUM CHLORIDE, POTASSIUM CHLORIDE, SODIUM LACTATE AND CALCIUM CHLORIDE: 600; 310; 30; 20 INJECTION, SOLUTION INTRAVENOUS at 08:12

## 2021-05-07 ASSESSMENT — ENCOUNTER SYMPTOMS
COLOR CHANGE: 0
SINUS PAIN: 0
RHINORRHEA: 0
COUGH: 0
BACK PAIN: 1
EYE DISCHARGE: 0
ABDOMINAL DISTENTION: 0
SHORTNESS OF BREATH: 1
SINUS PRESSURE: 0
APNEA: 0
CHEST TIGHTNESS: 0
SORE THROAT: 0
SHORTNESS OF BREATH: 1
WHEEZING: 0
EYE ITCHING: 0
STRIDOR: 0
NAUSEA: 0
DIARRHEA: 0
TROUBLE SWALLOWING: 0
ABDOMINAL PAIN: 0
CONSTIPATION: 0
VOICE CHANGE: 0
BLOOD IN STOOL: 0
VOMITING: 0

## 2021-05-07 ASSESSMENT — PAIN SCALES - GENERAL: PAINLEVEL_OUTOF10: 0

## 2021-05-07 ASSESSMENT — PAIN - FUNCTIONAL ASSESSMENT: PAIN_FUNCTIONAL_ASSESSMENT: 0-10

## 2021-05-07 NOTE — H&P
1 tablet by mouth daily for 3 days 5/6/21 5/9/21  Chino Obando MD   amoxicillin-clavulanate (AUGMENTIN) 875-125 MG per tablet Take 1 tablet by mouth 2 times daily for 7 days 5/1/21 5/8/21  Nickie Amezcua MD   sodium hypochlorite (DAKINS) 0.125 % SOLN external solution Moisten gauze apply to wound twice daily 4/29/21   Corinne Raymond, APRN - CNP   CPAP Machine MISC Inhale 1 each into the lungs nightly    Historical Provider, MD   enoxaparin (LOVENOX) 100 MG/ML injection 1ml BID 4/12/21   Chino Obando MD   Cyanocobalamin 1000 MCG/ML KIT Inject as directed every 30 days    Historical Provider, MD   gabapentin (NEURONTIN) 100 MG capsule TAKE 1 CAPSULE BY MOUTH THREE TIMES DAILY 3/18/21 6/16/21  Chino Obando MD   pantoprazole (PROTONIX) 40 MG tablet TAKE 1 TABLET BY MOUTH TWICE DAILY BEFORE MEAL(S) 3/18/21   Chino Obando MD   ferrous gluconate (FERGON) 240 (27 Fe) MG tablet Take 1 tablet by mouth 2 times daily 3/18/21   Chino Obadno MD   calcitRIOL (ROCALTROL) 0.25 MCG capsule Take 0.25 mcg by mouth daily    Dyana Chandra MD   bumetanide (BUMEX) 2 MG tablet Take 1 tablet by mouth daily 3/18/21   Chino Obando MD   levothyroxine (SYNTHROID) 100 MCG tablet Take 1 tablet by mouth Daily 3/18/21   Chino Obando MD   lisinopril (PRINIVIL;ZESTRIL) 40 MG tablet Take 1 tablet by mouth once daily 3/18/21   Chino Obando MD   baclofen (LIORESAL) 10 MG tablet Take 1 tablet by mouth 3 times daily As needed for hip pain 3/18/21   Chino Obando MD   escitalopram (LEXAPRO) 20 MG tablet Take 1 tablet by mouth daily 12/14/20   Chino Obando MD   fexofenadine (ALLEGRA) 180 MG tablet Take 1 tablet by mouth daily Indications:  Allergic Rhinitis 12/14/20   Chino Obando MD   tiotropium (SPIRIVA RESPIMAT) 2.5 MCG/ACT AERS inhaler Inhale 2 puffs into the lungs daily 11/23/20   Chino Obando MD   potassium chloride (KLOR-CON) 10 MEQ extended release tablet Take 10 mEq by mouth daily ([address]     Intestinal obstruction (HCC)     Iron deficiency     Left-sided weakness     Low vitamin D level     Lupus ([address]     Menopause     Obesity     Osteoarthritis     Osteoporosis     Other iron deficiency anemias     Palliative care patient 09/24/2020    Pernicious anemia     PUPP (pruritic urticarial papules and plaques of pregnancy)     Right leg numbness     Right sided sciatica     Sarcoidosis     with liver involvement    Sciatica     Secondary hyperparathyroidism ([address]     Shingles     Shortness of breath     Sleep apnea     Stomach ulcer     Syncope     Visual loss, one eye        Past Surgical History:  Past Surgical History:   Procedure Laterality Date    APPENDECTOMY      CARDIAC CATHETERIZATION  10/21/13  [address]    EF over 60%    CHOLECYSTECTOMY      COLONOSCOPY  02/2010    negative    COLONOSCOPY  02/22/2010    Dr Zee Press    COLONOSCOPY  04/01/2016    Dr LAKHWINDER Horvath-internal hemorrhoids, 5 yr recall    DILATATION, ESOPHAGUS      EYE SURGERY      Cyst on Right eye    EYE SURGERY      FRACTURE SURGERY      totsl knee replacement    GASTRIC BYPASS SURGERY      GASTRIC BYPASS SURGERY      HERNIA REPAIR      HYSTERECTOMY      Complete    HYSTERECTOMY      Partial - because had a tubal pregnancy.      INCONTINENCE SURGERY      Bladder Sling    INFECTED SKIN DEBRIDEMENT Right     dog bite right forearm    OTHER SURGICAL HISTORY      IVC filter    PACEMAKER INSERTION      PACEMAKER PLACEMENT      medtronic    MO TOTAL KNEE ARTHROPLASTY Right 03/26/2018    TOTAL KNEE REPLACEMENT COMPLEX PRIMARY performed by Nathalie Mcclelland MD at [address] [place] [address],[address]      SMALL INTESTINE SURGERY      SPLENECTOMY      KRISTEL AND BSO      TONSILLECTOMY AND ADENOIDECTOMY      UPPER GASTROINTESTINAL ENDOSCOPY  12/2011    gerd s/p gastric bypass    UPPER GASTROINTESTINAL ENDOSCOPY  02/2014    normal s/p gastric bypass    UPPER GASTROINTESTINAL ENDOSCOPY  02/2010    biopsy neg Barretts, chronic reflux esophagitis s/p gastric bypass    UPPER GASTROINTESTINAL ENDOSCOPY  07/2006    unremarkable s/p gastric bypass    UPPER GASTROINTESTINAL ENDOSCOPY  08/10/2015    Dr Jennifer Zeng UPPER GASTROINTESTINAL ENDOSCOPY N/A 04/01/2016    Dr. Alie Barr:  H Pylori(-), HH, o/w normal    VENA CAVA FILTER PLACEMENT Right 2003       Social History:  Social History     Tobacco Use    Smoking status: Never Smoker    Smokeless tobacco: Never Used   Substance Use Topics    Alcohol use: No    Drug use: No       Vital Signs:   Vitals:    05/07/21 0757   BP: (!) 183/91   Pulse: 77   Resp: 18   Temp: 98.9 °F (37.2 °C)   SpO2: 100%        Physical Exam:  Cardiac:  [x]WNL  []Comments:  Pulmonary:  [x]WNL   []Comments:  Neuro/Mental Status:  [x]WNL  []Comments:  Abdominal:  [x]WNL    []Comments:  Other:   []WNL  []Comments:    Informed Consent:  The risks and benefits of the procedure have been discussed with either the patient or if they cannot consent, their representative. Assessment:  Patient examined and appropriate for planned sedation and procedure. Plan:  Proceed with planned sedation and procedure as above.          Remington Romo MD

## 2021-05-07 NOTE — ANESTHESIA PRE PROCEDURE
Department of Anesthesiology  Preprocedure Note       Name:  Thompson Mcgraw   Age:  69 y.o.  :  1950                                          MRN:  [id number]         Date:  2021      Surgeon: Andrei Hudson):  Anil Cervantes MD    Procedure: Procedure(s):  EGD BIOPSY  COLONOSCOPY DIAGNOSTIC    Medications prior to admission:   Prior to Admission medications    Medication Sig Start Date End Date Taking?  Authorizing Provider   fluconazole (DIFLUCAN) 100 MG tablet Take 1 tablet by mouth daily for 3 days 21  Chun Draper MD   amoxicillin-clavulanate (AUGMENTIN) 875-125 MG per tablet Take 1 tablet by mouth 2 times daily for 7 days 21  Lorena Acosta MD   sodium hypochlorite (DAKINS) 0.125 % SOLN external solution Moisten gauze apply to wound twice daily 21   CHANELLE Johnson - CNP   CPAP Machine MISC Inhale 1 each into the lungs nightly    Historical Provider, MD   enoxaparin (LOVENOX) 100 MG/ML injection 1ml BID 21   Chun Draper MD   Cyanocobalamin 1000 MCG/ML KIT Inject as directed every 30 days    Historical Provider, MD   gabapentin (NEURONTIN) 100 MG capsule TAKE 1 CAPSULE BY MOUTH THREE TIMES DAILY 3/18/21 6/16/21  Chun Draper MD   pantoprazole (PROTONIX) 40 MG tablet TAKE 1 TABLET BY MOUTH TWICE DAILY BEFORE MEAL(S) 3/18/21   Chun Draper MD   ferrous gluconate (FERGON) 240 (27 Fe) MG tablet Take 1 tablet by mouth 2 times daily 3/18/21   Chun Draper MD   calcitRIOL (ROCALTROL) 0.25 MCG capsule Take 0.25 mcg by mouth daily    Yonis Castro MD   bumetanide (BUMEX) 2 MG tablet Take 1 tablet by mouth daily 3/18/21   Chun Draper MD   levothyroxine (SYNTHROID) 100 MCG tablet Take 1 tablet by mouth Daily 3/18/21   Chun Draper MD   lisinopril (PRINIVIL;ZESTRIL) 40 MG tablet Take 1 tablet by mouth once daily 3/18/21   Chun Draper MD   baclofen (LIORESAL) 10 MG tablet Take 1 tablet by mouth 3 times daily As needed for hip pain 3/18/21 Clementina Davenport MD   escitalopram (LEXAPRO) 20 MG tablet Take 1 tablet by mouth daily 12/14/20   Clementina Davenport MD   fexofenadine (ALLEGRA) 180 MG tablet Take 1 tablet by mouth daily Indications: Allergic Rhinitis 12/14/20   Clementina Davenport MD   tiotropium (SPIRIVA RESPIMAT) 2.5 MCG/ACT AERS inhaler Inhale 2 puffs into the lungs daily 11/23/20   Clementina Davenport MD   potassium chloride (KLOR-CON) 10 MEQ extended release tablet Take 10 mEq by mouth daily  10/27/20   Historical Provider, MD   warfarin (COUMADIN) 7.5 MG tablet Take 2 tablets by mouth daily 2 tablets daily, DO NOT RESUME UNTIL YOU RECHECK YOUR INR ON THURSDAY AND DISCUSS WITH PCP  Patient taking differently: Take 15 mg by mouth daily Indications: stated did not take yesterday 4/28/21as her PT was 3.8 2 tablets daily, DO NOT RESUME UNTIL YOU RECHECK YOUR INR ON THURSDAY AND DISCUSS WITH PCP 9/30/20 5/4/21  [facility], DO   calcipotriene (DOVONEX) 0.005 % cream Use topically as needed 7/7/20   Clementina Davenport MD   clobetasol (TEMOVATE) 0.05 % cream Apply topically 2 times daily. 7/7/20   Clementina Davenport MD   ondansetron (ZOFRAN) 4 MG tablet Take 1 tablet by mouth daily as needed for Nausea or Vomiting 1/31/20   Clementina Davenport MD   Multiple Vitamins-Minerals (CENTRUM ADULTS) TABS Take 1 tablet by mouth daily    Historical Provider, MD   aspirin 81 MG tablet Take 81 mg by mouth daily    Historical Provider, MD       Current medications:    Current Facility-Administered Medications   Medication Dose Route Frequency Provider Last Rate Last Admin    lactated ringers infusion   Intravenous Continuous Tiburcio Dodge  mL/hr at 05/07/21 0812 New Bag at 05/07/21 1897       Allergies:     Allergies   Allergen Reactions    Insect Extract Allergy Skin Test Anaphylaxis     Bee stings    Lactose Intolerance (Gi)     Prednisone      Headache and upset stomach    Zanaflex [Tizanidine Hcl]      Passed out lost control of body functions    Lortab [Hydrocodone-Acetaminophen] Nausea And Vomiting     Sweats, weak, nausea and vomiting    Other Nausea And Vomiting     Opiates------sweating, weak        Oxycodone-Acetaminophen Nausea And Vomiting     Sweating and vomiting     Ultram [Tramadol Hcl] Nausea And Vomiting     Sweating, weak, nausea and vomiting         Problem List:    Patient Active Problem List   Diagnosis Code    GERD (gastroesophageal reflux disease) K21.9    History of gastric bypass Z98.84    Family history of colon cancer Z80.0    Stable angina ([address] I20.8    Encounter for current long-term use of anticoagulants Z79.01    Primary osteoarthritis of right knee M17.11    Arthritis of knee M17.10    Essential hypertension I10    Hyperglycemia R73.9    MER (obstructive sleep apnea) G47.33    Iron deficiency anemia D50.9    Restrictive airway disease J98.4    DVT, lower extremity, proximal, acute, unspecified laterality (HCC) I82.4Y9    H/O systemic lupus erythematosus (SLE) ([facility]) M32.9    Anticoagulated on Coumadin Z79.01    Postmenopausal osteoporosis M81.0    Type II diabetes mellitus with nephropathy ([facility]) E11.21    Pacemaker Z95.0    History of DVT (deep vein thrombosis) Z86.718    Lupus anticoagulant disorder ([facility]) D68.62    History of pulmonary embolism Z86.711    Thrombocytopenia ([facility]) D69.6    Hypothyroidism E03.9    Morbidly obese ([facility]) E66.01    Asthmatic bronchitis without complication S94.681    Gastroenteritis X54.3    Colic F12.87    Abdominal pain R10.9    Intractable nausea and vomiting R11.2    Severe episode of recurrent major depressive disorder, without psychotic features ([address] F33.2    Lower abdominal pain R10.30    Chronic heartburn R12    S/P gastric bypass Z98.84    Dog bite W54. 0XXA    Cellulitis of right upper extremity L03. 113    Abscess of right arm L02.413    Soft tissue infection L08.9    Skin ulcer of upper arm, with fat layer exposed ([address] L98.492       Past Medical History: Diagnosis Date    Abdominal pain     Abnormal EKG     Acute sinusitis     Allergic reaction to spider bite     Anemia     Anticoagulated     Anxiety     Arrhythmia     Asthmatic bronchitis without complication 5/83/1773    Ataxic gait     Lyons's esophagus     Bowel obstruction (HCC)     Burn of abdomen wall, second degree, initial encounter 8/27/2018    Callus     Cardiac pacemaker     CKD (chronic kidney disease) stage 2, GFR 60-89 ml/min     Coat's syndrome     Coat's syndrome     both eyes    Depression     Diabetes mellitus type 2 in nonobese (HCC)     Diabetic nephropathy (HCC)     Disequilibrium     Dizziness     DVT (deep venous thrombosis) (HCC)     Exudative retinopathy     Fibromyalgia     Fibromyositis     Gastric ulcer     GERD (gastroesophageal reflux disease)     History of gastric bypass     Hx of lupus anticoagulant disorder     Hypertension     Hypothyroidism     Intermittent claudication (HCC)     Intestinal obstruction (HCC)     Iron deficiency     Left-sided weakness     Low vitamin D level     Lupus (HCC)     Menopause     Obesity     Osteoarthritis     Osteoporosis     Other iron deficiency anemias     Palliative care patient 09/24/2020    Pernicious anemia     PUPP (pruritic urticarial papules and plaques of pregnancy)     Right leg numbness     Right sided sciatica     Sarcoidosis     with liver involvement    Sciatica     Secondary hyperparathyroidism (HCC)     Shingles     Shortness of breath     Sleep apnea     Stomach ulcer     Syncope     Visual loss, one eye        Past Surgical History:        Procedure Laterality Date    APPENDECTOMY      CARDIAC CATHETERIZATION  10/21/13  [address]    EF over 60%    CHOLECYSTECTOMY      COLONOSCOPY  02/2010    negative    COLONOSCOPY  02/22/2010    Dr Ronnie Finch    COLONOSCOPY  04/01/2016    Dr LAKHWINDER Horvath-internal hemorrhoids, 5 yr recall    DILATATION, ESOPHAGUS      EYE SURGERY Cyst on Right eye    EYE SURGERY      FRACTURE SURGERY      totsl knee replacement    GASTRIC BYPASS SURGERY      GASTRIC BYPASS SURGERY      HERNIA REPAIR      HYSTERECTOMY      Complete    HYSTERECTOMY      Partial - because had a tubal pregnancy.  INCONTINENCE SURGERY      Bladder Sling    INFECTED SKIN DEBRIDEMENT Right     dog bite right forearm    OTHER SURGICAL HISTORY      IVC filter    PACEMAKER INSERTION      PACEMAKER PLACEMENT      medtronic    ID TOTAL KNEE ARTHROPLASTY Right 03/26/2018    TOTAL KNEE REPLACEMENT COMPLEX PRIMARY performed by Jazmin Pollard MD at [address] [place] [address]      SPLENECTOMY      KRISTEL AND BSO      TONSILLECTOMY AND ADENOIDECTOMY      UPPER GASTROINTESTINAL ENDOSCOPY  12/2011    gerd s/p gastric bypass    UPPER GASTROINTESTINAL ENDOSCOPY  02/2014    normal s/p gastric bypass    UPPER GASTROINTESTINAL ENDOSCOPY  02/2010    biopsy neg Barretts, chronic reflux esophagitis s/p gastric bypass    UPPER GASTROINTESTINAL ENDOSCOPY  07/2006    unremarkable s/p gastric bypass    UPPER GASTROINTESTINAL ENDOSCOPY  08/10/2015    Dr Yoselin Gleason UPPER GASTROINTESTINAL ENDOSCOPY N/A 04/01/2016    Dr. Aubrie Koroma:  H Pylori(-), HH, o/w normal    VENA CAVA FILTER PLACEMENT Right 2003       Social History:    Social History     Tobacco Use    Smoking status: Never Smoker    Smokeless tobacco: Never Used   Substance Use Topics    Alcohol use:  No                                Counseling given: Not Answered      Vital Signs (Current):   Vitals:    05/07/21 0757   BP: (!) 183/91   Pulse: 77   Resp: 18   Temp: 98.9 °F (37.2 °C)   TempSrc: Temporal   SpO2: 100%   Weight: 241 lb (109.3 kg)   Height: 5' 7\" (1.702 m)                                              BP Readings from Last 3 Encounters:   05/07/21 (!) 183/91   05/06/21 (!) 158/98   05/04/21 (!) 171/108       NPO Status: Time of last liquid consumption: 2217 Time of last solid consumption: 1800                        Date of last liquid consumption: 05/07/21                        Date of last solid food consumption: 05/05/21    BMI:   Wt Readings from Last 3 Encounters:   05/07/21 241 lb (109.3 kg)   05/06/21 241 lb 12.8 oz (109.7 kg)   05/01/21 247 lb 6.4 oz (112.2 kg)     Body mass index is 37.75 kg/m².     CBC:   Lab Results   Component Value Date    WBC 5.5 05/06/2021    RBC 3.40 05/06/2021    HGB 9.8 05/06/2021    HCT 32.7 05/06/2021    MCV 96.2 05/06/2021    RDW 15.9 05/06/2021     05/06/2021       CMP:   Lab Results   Component Value Date     05/06/2021    K 4.4 05/06/2021    K 4.0 05/01/2021     05/06/2021    CO2 24 05/06/2021    BUN 19 05/06/2021    CREATININE 1.0 05/06/2021    GFRAA >59 05/06/2021    AGRATIO 1.1 10/28/2020    LABGLOM 55 05/06/2021    GLUCOSE 89 05/06/2021    PROT 8.3 05/06/2021    PROT 7.7 09/06/2012    CALCIUM 8.9 05/06/2021    BILITOT 0.3 05/06/2021    ALKPHOS 80 05/06/2021    AST 22 05/06/2021    ALT 11 05/06/2021       POC Tests:   Recent Labs     05/07/21  0807   POCGLU 83       Coags:   Lab Results   Component Value Date    PROTIME 14.3 05/06/2021    INR 1.11 05/06/2021    APTT 44.8 04/29/2021       HCG (If Applicable): No results found for: PREGTESTUR, PREGSERUM, HCG, HCGQUANT     ABGs:   Lab Results   Component Value Date    PHART 7.460 09/23/2020    PO2ART 60.0 09/23/2020    VFH8SWA 27.0 09/23/2020    LAJ7EOP 19.2 09/23/2020    BEART -3.5 09/23/2020    G5XXUIDZ 92.2 09/23/2020        Type & Screen (If Applicable):  No results found for: LABABO, LABRH    Drug/Infectious Status (If Applicable):  No results found for: HIV, HEPCAB    COVID-19 Screening (If Applicable):   Lab Results   Component Value Date    COVID19 Not Detected 05/04/2021           Anesthesia Evaluation  Patient summary reviewed and Nursing notes reviewed no history of anesthetic complications:   Airway: Mallampati: III     Neck ROM: full  Mouth opening: < 3 FB Dental:    (+) edentulous      Pulmonary:   (+) shortness of breath:  sleep apnea: on CPAP,  asthma:                            Cardiovascular:    (+) hypertension:, angina:, pacemaker: pacemaker,         Rhythm: regular  Rate: normal                    Neuro/Psych:   (+) neuromuscular disease:, TIA, psychiatric history:            GI/Hepatic/Renal:   (+) GERD:, PUD,           Endo/Other:    (+) DiabetesType II DM, , hypothyroidism, blood dyscrasia (last Coumadin 05/01/21, Lovenox injections bridge therapy last dose 2200 last night): anticoagulation therapy, arthritis:., .                 Abdominal:           Vascular:                                        Anesthesia Plan      general and TIVA     ASA 3       Induction: intravenous. Anesthetic plan and risks discussed with patient. Plan discussed with CRNA.                   CHANELLE Garcia - LUCIA   5/7/2021

## 2021-05-07 NOTE — OP NOTE
Patient: Ramon Luna : 1950  [facility] Rec#: [id number] Acc#: [id number]   Primary Care Provider Rj Faria MD    Date of Procedure:  2021    Endoscopist: Sid Terrell MD    Referring Provider: Rj Faria MD,     Operation Performed: Colonoscopy with biopsy    Indications: diarrhea, change in bowel habits    Anesthesia:  Sedation was administered by anesthesia who monitored the patient during the procedure. I met with Ramon Luna prior to procedure. We discussed the procedure itself, and I have discussed the risks of endoscopy (including-- but not limited to-- pain, discomfort, bleeding potentially requiring second endoscopic procedure and/or blood transfusion, organ perforation requiring operative repair, damage to organs near the colon, infection, aspiration, cardiopulmonary/allergic reaction), benefits, indications to endoscopy. Additionally, we discussed options other than colonoscopy. The patient expressed understanding. All questions answered. The patient decided to proceed with the procedure. Signed informed consent was placed on the chart. Blood Loss: minimal    Withdrawal time: n/a  Bowel Prep: adequate     Complications: no immediate complications    DESCRIPTION OF PROCEDURE:     A time out was performed. After written informed consent was obtained, the patient was placed in the left lateral position. The perianal area was inspected, and a digital rectal exam was performed. A rectal exam was performed: normal tone, no palpable lesions. At this point, a forward viewing Olympus colonoscope was inserted into the anus and carefully advanced to the transverse colon, the cecum was not reached due to significant looping and tortuosity to the colon. The colonoscope was then slowly withdrawn with careful inspection of the mucosa in a linear and circumferential fashion. The scope was retroflexed in the rectum.  Suction was utilized during the procedure to remove as much air as possible from the bowel. The colonoscope was removed from the patient, and the procedure was terminated. Findings are listed below. Findings: There was significant looping/tortuosity throughout the left colon. This in combination with patient's body habitus prevented intubation of the cecum. The mucosa appeared normal throughout the entire examined colon   Random biopsies obtained to evaluate for microscopic colitis. Retroflexion in the rectum was normal and revealed no further abnormalities         Recommendations:  1. Barium Enema today to better evaluate anatomy   2. Repeat colonoscopy: pending pathology and Barium Enema results. 3. Await biopsy results-you will receive a letter with your results within 7-10 days    Findings and recommendations were discussed w/ the patient. A copy of the images was provided.     Sid Terrell MD  5/7/2021  8:58 AM

## 2021-05-07 NOTE — ANESTHESIA POSTPROCEDURE EVALUATION
Department of Anesthesiology  Postprocedure Note    Patient: Severiano Ornelas  MRN: [id number]  YOB: 1950  Date of evaluation: 5/7/2021  Time:  8:56 AM     Procedure Summary     Date: 05/07/21 Room / Location: [address]    Anesthesia Start: [address] [facility] Anesthesia Stop: 5249    Procedures:       EGD BIOPSY (N/A Abdomen)      COLONOSCOPY DIAGNOSTIC (N/A ) Diagnosis: (ABD PAIN, CHANGE IN BH, N/V)    Surgeons: Beulah Ren MD Responsible Provider: CHANELLE Gan CRNA    Anesthesia Type: general, TIVA ASA Status: 3          Anesthesia Type: general, TIVA    Alex Phase I: Alex Score: 10    Alex Phase II:      Last vitals: Reviewed and per EMR flowsheets.        Anesthesia Post Evaluation    Patient location during evaluation: PACU  Patient participation: complete - patient participated  Level of consciousness: awake and alert  Pain score: 0  Airway patency: patent  Nausea & Vomiting: no nausea and no vomiting  Complications: no  Cardiovascular status: hemodynamically stable and blood pressure returned to baseline  Respiratory status: acceptable and room air  Hydration status: stable

## 2021-05-07 NOTE — OP NOTE
Endoscopic Procedure Note    Patient: Thompson Mcgraw : 1950  Med Rec#: [id number] Acc#: [id number]     Primary Care Provider Chun Draper MD  Referring Provider: Rudolpho Buerger APRN    Endoscopist: Jewel Morales MD    Date of Procedure:  2021    Procedure:   1. EGD with biopsy    Indications:   1. Diarrhea,   2. Nausea      Anesthesia:  Sedation was administered by anesthesia who monitored the patient during the procedure. Estimated Blood Loss: minimal    Procedure:   After reviewing the patient's chart and obtaining informed consent, the patient was placed in the left lateral decubitus position. A forward-viewing Olympus endoscope was lubricated and inserted through the mouth into the oropharynx. Under direct visualization, the upper esophagus was intubated. The scope was advanced to the level of the third portion of duodenum. Scope was slowly withdrawn with careful inspection of the mucosal surfaces. The scope was retroflexed for inspection of the gastric fundus and incisura. Findings and maneuvers are listed in impression below. The patient tolerated the procedure well. The scope was removed. There were no immediate complications. Findings:   Esophagus: normal appearing mucosa. A small hiatal hernia noted. Stomach:  abnormal: there are post surgical changes c/w previous gastric bypass surgery. No obvious ulceration or lesion noted. Gastric biopsies were taken from the antrum and body to rule out Helicobacter pylori infection. Small Bowel: normal      IMPRESSION:  1. S/p gastric and duodenal biopsies as above. RECOMMENDATIONS:    1. Await path results, the patient will be contacted in 7-10 days with biopsy results. 2.  Continue current meds  3. Okay for Imodium 0.5 tabs twice daily as needed for diarrhea  4. NSAID avoidance. The results were discussed with the patient and family. A copy of the images obtained were given to the patient.      Anil Cervantes MD  2021  8:52

## 2021-05-11 ENCOUNTER — CARE COORDINATION (OUTPATIENT)
Dept: CARE COORDINATION | Age: 71
End: 2021-05-11

## 2021-05-11 NOTE — CARE COORDINATION
Ambulatory Care Coordination Note  5/11/2021  CM Risk Score: 14  Charlson 10 Year Mortality Risk Score: 100%     ACC: Casandra Beasley RN    Summary Note: ACM spoke to Andre Martinez today. She reports a fall on Sunday. Pt was walking up a sidewalk and tripped on an elevated area. Renell Patel face first but denied hitting her head. Andre Martinez has some scratches on her knees. Patient said she caught herself on her hands and her previously injured arm is more sore and has some increased swelling. Her granddaughter said the wound looks OK. She has a wound clinic appointment on 5/13. Plan:  Patient has used ice to arm since Sunday's fall. ACM encouraged pt to keep arm elevated as much as she can. Discussed taking work time off to do so but patient does not wish to do that. Advised continued monitoring of her arm for changes in the drainage, warmth or increased pain to site itself. If pt develops sx or has fever or not able to move arm as typical, contact wound clinic or PCP for sooner f/u appt and evaluation. Continue to take Aleve as tolerated or may use Tylenol or Ibuprofen as needed and tolerated. Care Coordination Interventions    Program Enrollment: Complex Care  Referral from Primary Care Provider: No  Suggested Interventions and Community Resources  Other Therapy Services: Completed (Comment: Scott Bingham)  Zone Management Tools: In Process (Comment: 5/11: Pt arm wound slowly improving, fell on Sunday and increased soreness from that. Denied serious injury.  Has wound clinic appt 5/13.)         Goals Addressed                 This Visit's Progress     Patient Stated (pt-stated)   On track     I need someone to do my taxes    Barriers: Unknown community resource options  Plan for overcoming my barriers:   Pt partnering with [facility] to find local resource to prepare her income tax return  Pt willing to ask daughter for agency information  Confidence: 9/10  Anticipated Goal Completion Date: 5/14/21            Prior to Admission medications    Medication Sig Start Date End Date Taking? Authorizing Provider   sodium hypochlorite (DAKINS) 0.125 % SOLN external solution Moisten gauze apply to wound twice daily 4/29/21  Yes Nica Samaniego, APRN - CNP   CPAP Machine MISC Inhale 1 each into the lungs nightly   Yes Historical Provider, MD   Cyanocobalamin 1000 MCG/ML KIT Inject as directed every 30 days   Yes Historical Provider, MD   gabapentin (NEURONTIN) 100 MG capsule TAKE 1 CAPSULE BY MOUTH THREE TIMES DAILY 3/18/21 6/16/21 Yes Shahrzad Gillespie MD   pantoprazole (PROTONIX) 40 MG tablet TAKE 1 TABLET BY MOUTH TWICE DAILY BEFORE MEAL(S) 3/18/21  Yes Shahrzad Gillespie MD   ferrous gluconate (FERGON) 240 (27 Fe) MG tablet Take 1 tablet by mouth 2 times daily 3/18/21  Yes Shahrzad Gillespie MD   calcitRIOL (ROCALTROL) 0.25 MCG capsule Take 0.25 mcg by mouth daily   Yes Del Sanchez MD   bumetanide (BUMEX) 2 MG tablet Take 1 tablet by mouth daily 3/18/21  Yes Shahrzad Gillespie MD   levothyroxine (SYNTHROID) 100 MCG tablet Take 1 tablet by mouth Daily 3/18/21  Yes Shahrzad Gillespie MD   lisinopril (PRINIVIL;ZESTRIL) 40 MG tablet Take 1 tablet by mouth once daily 3/18/21  Yes Shahrzad Gillespie MD   baclofen (LIORESAL) 10 MG tablet Take 1 tablet by mouth 3 times daily As needed for hip pain 3/18/21  Yes Shahrzad Gillespie MD   escitalopram (LEXAPRO) 20 MG tablet Take 1 tablet by mouth daily 12/14/20  Yes Shahrzad Gillespie MD   fexofenadine (ALLEGRA) 180 MG tablet Take 1 tablet by mouth daily Indications:  Allergic Rhinitis 12/14/20  Yes Shahrzad Gillespie MD   tiotropium (SPIRIVA RESPIMAT) 2.5 MCG/ACT AERS inhaler Inhale 2 puffs into the lungs daily 11/23/20  Yes Shahrzad Gillespie MD   potassium chloride (KLOR-CON) 10 MEQ extended release tablet Take 10 mEq by mouth daily  10/27/20  Yes Historical Provider, MD   warfarin (COUMADIN) 7.5 MG tablet Take 2 tablets by mouth daily 2 tablets daily, DO NOT RESUME UNTIL YOU RECHECK YOUR INR ON THURSDAY AND DISCUSS WITH PCP  Patient taking differently: Take 15 mg by mouth daily Indications: stated did not take yesterday 4/28/21as her PT was 3.8 2 tablets daily, DO NOT RESUME UNTIL YOU RECHECK YOUR INR ON THURSDAY AND DISCUSS WITH PCP 9/30/20 5/11/21 Yes Sami Austin DO   calcipotriene (DOVONEX) 0.005 % cream Use topically as needed 7/7/20  Yes Taniya Berrios MD   clobetasol (TEMOVATE) 0.05 % cream Apply topically 2 times daily.  7/7/20  Yes Taniya Berrios MD   ondansetron (ZOFRAN) 4 MG tablet Take 1 tablet by mouth daily as needed for Nausea or Vomiting 1/31/20  Yes Taniya Berrios MD   Multiple Vitamins-Minerals (CENTRUM ADULTS) TABS Take 1 tablet by mouth daily   Yes Historical Provider, MD   aspirin 81 MG tablet Take 81 mg by mouth daily   Yes Historical Provider, MD   enoxaparin (LOVENOX) 100 MG/ML injection 1ml BID 4/12/21   Taniya Berrios MD       Future Appointments   Date Time Provider Jose Shea   5/13/2021 10:15 AM Elel SquCHANELLE siegel - CNP [facility]   6/1/2021 11:15 AM L DEXA RM 1 L [facility]   6/1/2021 11:40 AM L MAMMO RM 2 L [facility]   6/24/2021  9:40 AM SCHEDULE, [facility] MED ONC MA L MED ONC [facility]   6/24/2021 10:00 [facility]   7/7/2021  9:45 AM Taniya Berrios MD [facility]

## 2021-05-13 ENCOUNTER — ANTI-COAG VISIT (OUTPATIENT)
Dept: INTERNAL MEDICINE | Age: 71
End: 2021-05-13
Payer: MEDICARE

## 2021-05-13 ENCOUNTER — HOSPITAL ENCOUNTER (OUTPATIENT)
Dept: WOUND CARE | Age: 71
Discharge: HOME | End: 2021-05-13
Payer: MEDICARE

## 2021-05-13 ENCOUNTER — CARE COORDINATION (OUTPATIENT)
Dept: CARE COORDINATION | Age: 71
End: 2021-05-13

## 2021-05-13 VITALS
DIASTOLIC BLOOD PRESSURE: 87 MMHG | HEART RATE: 65 BPM | TEMPERATURE: 96.5 F | RESPIRATION RATE: 18 BRPM | WEIGHT: 241 LBS | BODY MASS INDEX: 37.83 KG/M2 | SYSTOLIC BLOOD PRESSURE: 150 MMHG | HEIGHT: 67 IN

## 2021-05-13 DIAGNOSIS — Z79.01 ANTICOAGULATED ON COUMADIN: ICD-10-CM

## 2021-05-13 DIAGNOSIS — L02.413 ABSCESS OF RIGHT ARM: ICD-10-CM

## 2021-05-13 DIAGNOSIS — L98.492 SKIN ULCER OF UPPER ARM, WITH FAT LAYER EXPOSED (HCC): ICD-10-CM

## 2021-05-13 LAB — INR BLD: 1.6

## 2021-05-13 PROCEDURE — 11042 DBRDMT SUBQ TIS 1ST 20SQCM/<: CPT

## 2021-05-13 PROCEDURE — 93793 ANTICOAG MGMT PT WARFARIN: CPT | Performed by: INTERNAL MEDICINE

## 2021-05-13 PROCEDURE — 97597 DBRDMT OPN WND 1ST 20 CM/<: CPT | Performed by: NURSE PRACTITIONER

## 2021-05-13 ASSESSMENT — PAIN DESCRIPTION - FREQUENCY: FREQUENCY: CONTINUOUS

## 2021-05-13 ASSESSMENT — PAIN DESCRIPTION - PROGRESSION: CLINICAL_PROGRESSION: NOT CHANGED

## 2021-05-13 ASSESSMENT — PAIN DESCRIPTION - PAIN TYPE: TYPE: SURGICAL PAIN

## 2021-05-13 NOTE — PROGRESS NOTES
HOME MONITORING REPORT    INR today:   Results for orders placed or performed in visit on 05/13/21   Protime-INR   Result Value Ref Range    INR 1.60        INR Goal: 2.0-3.0    Dosing Plan  As of 5/13/2021    TTR:  21.0 % (3 y)   Full warfarin instructions:  7.5 mg every Wed; 15 mg all other days              PLAN: Advised patient/caregiver to increase her dose tomorrow night to 15 mg, then continue current dose and recheck in one week. Patient/Caregiver voiced understanding    Electronically signed by Tayo Cueva MD on 5/13/2021 at 1:31 PM    I have reviewed nursing plan for Coumadin management and agree with plan.

## 2021-05-13 NOTE — CARE COORDINATION
Ambulatory Care Coordination Note  5/13/2021  CM Risk Score: 14  Charlson 10 Year Mortality Risk Score: 100%     ACC: Gurpreet Whaley, RN     Summary Note: AC spoke to Indu Lowry. Pt had wound clinic appt and they said arm is healing. Pain is some improved. She stated provider encouraged increased protein intake. Pt has some increased fatigue. Yesterday she felt weaker than usual - she continues to work daily and plans to go to work today. Granddtr changed drsg and she took a nap. She has not checked her vitals at home lately. Plan: Indu Lowry will check her BP and FBS over the next few days. Encouraged pt to monitor her symptoms and to take some time for rest if she notices decreased stamina or more weakness in general.  Recommended read label on oral supplement that her granddaughter has to verify it has protein or patient may consider a generic protein drink supplement. She voiced understanding. Care Coordination Interventions    Program Enrollment: Complex Care  Referral from Primary Care Provider: No  Suggested Interventions and Community Resources  Other Therapy Services: Completed (Comment: Wound Clinic)  Zone Management Tools: In Process (Comment: 5/13: Wound clinic dressed arm today, stated it is healing. They inst more protein intake. Pt fatigued more, urged to not overdue. Will check BP and FBS over next few days.)         Goals Addressed                 This Visit's Progress     Patient Stated (pt-stated)   On track     I need someone to do my taxes    Barriers: Unknown community resource options  Plan for overcoming my barriers:   Pt partnering with [facility] to find local resource to prepare her income tax return  Pt willing to ask daughter for agency information  Confidence: 9/10  Anticipated Goal Completion Date: 5/14/21            Prior to Admission medications    Medication Sig Start Date End Date Taking?  Authorizing Provider   sodium hypochlorite (DAKINS) 0.125 % SOLN external solution Moisten gauze apply to wound twice daily 4/29/21   Macy Ross, APRN - CNP   CPAP Machine MISC Inhale 1 each into the lungs nightly    Historical Provider, MD   enoxaparin (LOVENOX) 100 MG/ML injection 1ml BID  Patient taking differently: Indications: stated back on coumadin now, this was for endoscopy 1ml BID 4/12/21   Chun Draper MD   Cyanocobalamin 1000 MCG/ML KIT Inject as directed every 30 days    Historical Provider, MD   gabapentin (NEURONTIN) 100 MG capsule TAKE 1 CAPSULE BY MOUTH THREE TIMES DAILY 3/18/21 6/16/21  Chun Draper MD   pantoprazole (PROTONIX) 40 MG tablet TAKE 1 TABLET BY MOUTH TWICE DAILY BEFORE MEAL(S) 3/18/21   Chun Draper MD   ferrous gluconate (FERGON) 240 (27 Fe) MG tablet Take 1 tablet by mouth 2 times daily 3/18/21   Chun Draper MD   calcitRIOL (ROCALTROL) 0.25 MCG capsule Take 0.25 mcg by mouth daily    Yonis Castro MD   bumetanide (BUMEX) 2 MG tablet Take 1 tablet by mouth daily 3/18/21   Chun Draper MD   levothyroxine (SYNTHROID) 100 MCG tablet Take 1 tablet by mouth Daily 3/18/21   Chun Draper MD   lisinopril (PRINIVIL;ZESTRIL) 40 MG tablet Take 1 tablet by mouth once daily 3/18/21   Chun Draper MD   baclofen (LIORESAL) 10 MG tablet Take 1 tablet by mouth 3 times daily As needed for hip pain 3/18/21   Chun Draper MD   escitalopram (LEXAPRO) 20 MG tablet Take 1 tablet by mouth daily 12/14/20   Chun Draper MD   fexofenadine (ALLEGRA) 180 MG tablet Take 1 tablet by mouth daily Indications:  Allergic Rhinitis 12/14/20   Chun Draper MD   tiotropium (SPIRIVA RESPIMAT) 2.5 MCG/ACT AERS inhaler Inhale 2 puffs into the lungs daily 11/23/20   Chun Draper MD   potassium chloride (KLOR-CON) 10 MEQ extended release tablet Take 10 mEq by mouth daily  10/27/20   Historical Provider, MD   warfarin (COUMADIN) 7.5 MG tablet Take 2 tablets by mouth daily 2 tablets daily, DO NOT RESUME UNTIL YOU RECHECK YOUR INR ON THURSDAY AND DISCUSS WITH

## 2021-05-14 NOTE — PROGRESS NOTES
[address]   Progress Note and Procedure Note      Emelyn Walker  MEDICAL RECORD NUMBER:  [id number]  AGE: 69 y.o. GENDER: female  : 1950  EPISODE DATE:  2021    Subjective:     Chief Complaint   Patient presents with    Wound Check     none         HISTORY of PRESENT ILLNESS HPI     Emelyn Walker is a 69 y.o. female who presents today for wound/ulcer evaluation.    History of Wound Context: right arm wound follow up    Ulcer Identification:  Ulcer Type: surgical incision from abscess  Contributing Factors: diabetes and malnutrition    Wound: N/A        PAST MEDICAL HISTORY        Diagnosis Date    Abdominal pain     Abnormal EKG     Acute sinusitis     Allergic reaction to spider bite     Anemia     Anticoagulated     Anxiety     Arrhythmia     Asthmatic bronchitis without complication 1712    Ataxic gait     Lyons's esophagus     Bowel obstruction (HCC)     Burn of abdomen wall, second degree, initial encounter 2018    Callus     Cardiac pacemaker     CKD (chronic kidney disease) stage 2, GFR 60-89 ml/min     Coat's syndrome     Coat's syndrome     both eyes    Depression     Diabetes mellitus type 2 in nonobese (HCC)     Diabetic nephropathy (HCC)     Disequilibrium     Dizziness     DVT (deep venous thrombosis) (HCC)     Exudative retinopathy     Fibromyalgia     Fibromyositis     Gastric ulcer     GERD (gastroesophageal reflux disease)     History of gastric bypass     Hx of lupus anticoagulant disorder     Hypertension     Hypothyroidism     Intermittent claudication (HCC)     Intestinal obstruction (HCC)     Iron deficiency     Left-sided weakness     Low vitamin D level     Lupus (HCC)     Menopause     Obesity     Osteoarthritis     Osteoporosis     Other iron deficiency anemias     Palliative care patient 2020    Pernicious anemia     PUPP (pruritic urticarial papules and plaques of pregnancy)     Right leg numbness     Right sided sciatica     Sarcoidosis     with liver involvement    Sciatica     Secondary hyperparathyroidism (HCC)     Shingles     Shortness of breath     Sleep apnea     Stomach ulcer     Syncope     Visual loss, one eye        PAST SURGICAL HISTORY    Past Surgical History:   Procedure Laterality Date    APPENDECTOMY      CARDIAC [facility]TERIZATION  [facility]21/[facility]    EF over 60%    CHOLECYSTECTOMY      COLONOSCOPY  02/2010    negative    COLONOSCOPY  02/22/2010    Dr Vikram Lyons    COLONOSCOPY  04/01/2016    Dr LAKHWINDER Horvath-internal hemorrhoids, 5 yr recall    COLONOSCOPY N/A 05/07/2021    Dr Samantha Hopson looping/tortuosity throughout the left colon, BE=Normal    DILATATION, ESOPHAGUS      EYE SURGERY      Cyst on Right eye    EYE SURGERY      GASTRIC BYPASS SURGERY      GASTRIC BYPASS SURGERY      HERNIA REPAIR      HYSTERECTOMY      Complete    HYSTERECTOMY      Partial - because had a tubal pregnancy.      INCONTINENCE SURGERY      Bladder Sling    INFECTED SKIN DEBRIDEMENT Right     dog bite right forearm    OTHER SURGICAL HISTORY      IVC filter    PACEMAKER INSERTION      PACEMAKER PLACEMENT      medtronic    IA TOTAL KNEE ARTHROPLASTY Right 03/26/2018    TOTAL KNEE REPLACEMENT COMPLEX PRIMARY performed by Sachin Crocker MD at [address] [place] [address]      SPLENECTOMY      KRISTEL AND BSO      TONSILLECTOMY AND ADENOIDECTOMY      TOTAL KNEE ARTHROPLASTY      UPPER GASTROINTESTINAL ENDOSCOPY  12/2011    gerd s/p gastric bypass    UPPER GASTROINTESTINAL ENDOSCOPY  02/2014    normal s/p gastric bypass    UPPER GASTROINTESTINAL ENDOSCOPY  02/2010    biopsy neg Barretts, chronic reflux esophagitis s/p gastric bypass    UPPER GASTROINTESTINAL ENDOSCOPY  07/2006    unremarkable s/p gastric bypass    UPPER GASTROINTESTINAL ENDOSCOPY  08/10/2015    Dr Real John   [facility] ENDOSCOPY N/A 04/01/2016     Alexandru:  H Pylori(-), HH, o/w normal    UPPER GASTROINTESTINAL ENDOSCOPY N/A 05/07/2021    Dr Heike Rodriguez hiatal hernia, previous gastric bypass surgery, gastritis    VENA CAVA FILTER PLACEMENT Right 2003       FAMILY HISTORY    Family History   Problem Relation Age of Onset    Uterine Cancer Mother     Cervical Cancer Mother     Coronary Art Dis Mother     Heart Disease Father     Lung Cancer Father     Other Father         renal failure    Colon Cancer Brother     Colon Polyps Brother     Uterine Cancer Maternal Grandmother     Cervical Cancer Maternal Grandmother     Diabetes Maternal Grandmother     Cervical Cancer Sister     Other Sister         fibromyalgia    Diabetes Paternal Aunt     Esophageal Cancer Neg Hx     Liver Cancer Neg Hx     Liver Disease Neg Hx     Stomach Cancer Neg Hx     Rectal Cancer Neg Hx        SOCIAL HISTORY    Social History     Tobacco Use    Smoking status: Never Smoker    Smokeless tobacco: Never Used   Substance Use Topics    Alcohol use: No    Drug use: No       ALLERGIES    Allergies   Allergen Reactions    Insect Extract Allergy Skin Test Anaphylaxis     Bee stings    Lactose Intolerance (Gi)     Prednisone      Headache and upset stomach    Zanaflex [Tizanidine Hcl]      Passed out lost control of body functions    Lortab [Hydrocodone-Acetaminophen] Nausea And Vomiting     Sweats, weak, nausea and vomiting    Other Nausea And Vomiting     Opiates------sweating, weak        Oxycodone-Acetaminophen Nausea And Vomiting     Sweating and vomiting     Ultram [Tramadol Hcl] Nausea And Vomiting     Sweating, weak, nausea and vomiting         MEDICATIONS    Current Outpatient Medications on File Prior to Encounter   Medication Sig Dispense Refill    sodium hypochlorite (DAKINS) 0.125 % SOLN external solution Moisten gauze apply to wound twice daily 1000 mL 2    Cyanocobalamin 1000 MCG/ML KIT Inject as directed every 30 days      gabapentin (NEURONTIN) 100 MG capsule TAKE 1 CAPSULE BY MOUTH THREE TIMES DAILY 270 capsule 0    pantoprazole (PROTONIX) 40 MG tablet TAKE 1 TABLET BY MOUTH TWICE DAILY BEFORE MEAL(S) 30 tablet 0    ferrous gluconate (FERGON) 240 (27 Fe) MG tablet Take 1 tablet by mouth 2 times daily 180 tablet 2    calcitRIOL (ROCALTROL) 0.25 MCG capsule Take 0.25 mcg by mouth daily      bumetanide (BUMEX) 2 MG tablet Take 1 tablet by mouth daily 30 tablet 5    levothyroxine (SYNTHROID) 100 MCG tablet Take 1 tablet by mouth Daily 90 tablet 3    lisinopril (PRINIVIL;ZESTRIL) 40 MG tablet Take 1 tablet by mouth once daily 90 tablet 1    baclofen (LIORESAL) 10 MG tablet Take 1 tablet by mouth 3 times daily As needed for hip pain 10 tablet 1    escitalopram (LEXAPRO) 20 MG tablet Take 1 tablet by mouth daily 90 tablet 3    fexofenadine (ALLEGRA) 180 MG tablet Take 1 tablet by mouth daily Indications: Allergic Rhinitis 90 tablet 3    tiotropium (SPIRIVA RESPIMAT) 2.5 MCG/ACT AERS inhaler Inhale 2 puffs into the lungs daily 1 Inhaler 5    potassium chloride (KLOR-CON) 10 MEQ extended release tablet Take 10 mEq by mouth daily       warfarin (COUMADIN) 7.5 MG tablet Take 2 tablets by mouth daily 2 tablets daily, DO NOT RESUME UNTIL YOU RECHECK YOUR INR ON THURSDAY AND DISCUSS WITH PCP (Patient taking differently: Take 15 mg by mouth daily Indications: stated did not take yesterday 4/28/21as her PT was 3.8 2 tablets daily, DO NOT RESUME UNTIL YOU RECHECK YOUR INR ON THURSDAY AND DISCUSS WITH PCP) 60 tablet 0    calcipotriene (DOVONEX) 0.005 % cream Use topically as needed 3 Tube 3    clobetasol (TEMOVATE) 0.05 % cream Apply topically 2 times daily.  3 Tube 3    ondansetron (ZOFRAN) 4 MG tablet Take 1 tablet by mouth daily as needed for Nausea or Vomiting 30 tablet 0    Multiple Vitamins-Minerals (CENTRUM ADULTS) TABS Take 1 tablet by mouth daily      aspirin 81 MG tablet Take 81 mg by mouth daily      CPAP Machine MISC Inhale 1 each into the lungs nightly      enoxaparin (LOVENOX) 100 MG/ML injection 1ml BID (Patient taking differently: Indications: stated back on coumadin now, this was for endoscopy 1ml BID) 10 Syringe 0     Current Facility-Administered Medications on File Prior to Encounter   Medication Dose Route Frequency Provider Last Rate Last Admin    cyanocobalamin injection 1,000 mcg  1,000 mcg Intramuscular Once Isauro Carr MD           REVIEW OF SYSTEMS    Pertinent items are noted in HPI.     Objective:      BP (!) 150/87   Pulse 65   Temp 96.5 °F (35.8 °C) (Temporal)   Resp 18   Ht 5' 7\" (1.702 m)   Wt 241 lb (109.3 kg)   BMI 37.75 kg/m²     Wt Readings from Last 3 Encounters:   05/13/21 241 lb (109.3 kg)   05/07/21 241 lb (109.3 kg)   05/06/21 241 lb 12.8 oz (109.7 kg)       PHYSICAL EXAM    General Appearance: alert and oriented to person, place and time, well developed and well- nourished, in no acute distress  Skin: warm and dry, no rash or erythema  Head: normocephalic and atraumatic  Eyes: pupils equal, round, and reactive to light, extraocular eye movements intact, conjunctivae normal  ENT: tympanic membrane, external ear and ear canal normal bilaterally, nose without deformity, nasal mucosa and turbinates normal without polyps  Neck: supple and non-tender without mass, no thyromegaly or thyroid nodules, no cervical lymphadenopathy  Pulmonary/Chest: clear to auscultation bilaterally- no wheezes, rales or rhonchi, normal air movement, no respiratory distress  Extremities: no cyanosis, clubbing or edema  Musculoskeletal: normal range of motion, no joint swelling, deformity or tenderness  Neurologic: reflexes normal and symmetric, no cranial nerve deficit, gait, coordination and speech normal      Assessment:      Patient Active Problem List   Diagnosis Code    GERD (gastroesophageal reflux disease) K21.9    History of gastric bypass Z98.84    Family history of colon cancer Z80.0    Stable angina (HCC) I20.8    Encounter for current long-term use of anticoagulants Z79.01    Primary osteoarthritis of right knee M17.11    Arthritis of knee M17.10    Essential hypertension I10    Hyperglycemia R73.9    MER (obstructive sleep apnea) G47.33    Iron deficiency anemia D50.9    Restrictive airway disease J98.4    DVT, lower extremity, proximal, acute, unspecified laterality ([facility]) I82.4Y9    H/O systemic lupus erythematosus (SLE) ([facility]) M32.9    Anticoagulated on Coumadin Z79.01    Postmenopausal osteoporosis M81.0    Type II diabetes mellitus with nephropathy ([facility]) E11.21    Pacemaker Z95.0    History of DVT (deep vein thrombosis) Z86.718    Lupus anticoagulant disorder ([facility]) D68.62    History of pulmonary embolism Z86.711    Thrombocytopenia ([facility]) D69.6    Hypothyroidism E03.9    Morbidly obese ([facility]) E66.01    Asthmatic bronchitis without complication F81.518    Gastroenteritis A73.6    Colic C60.45    Abdominal pain R10.9    Intractable nausea and vomiting R11.2    Severe episode of recurrent major depressive disorder, without psychotic features ([address] F33.2    Lower abdominal pain R10.30    Chronic heartburn R12    S/P gastric bypass Z98.84    Dog bite W54. 0XXA    Cellulitis of right upper extremity L03. 113    Abscess of right arm L02.413    Soft tissue infection L08.9    Skin ulcer of upper arm, with fat layer exposed ([address] L98.492        Procedure Note  Indications:  Based on my examination of this patient's wound(s)/ulcer(s) today, debridement is required to promote healing and evaluate the wound base. Performed by: CHANELLE Rivas CNP    Consent obtained:  Yes    Time out taken:  Yes    Pain Control: Anesthetic  Anesthetic: 2% Lidocaine Gel Topical       Debridement:Excisional Debridement    Using curette the wound(s)/ulcer(s) was/were sharply debrided down through and including the removal of epidermis, dermis and subcutaneous tissue.         Devitalized Tissue Debrided:  fibrin, biofilm, slough and exudate    Pre Debridement Measurements:  Are located in the [facility]  Documentation Flow Sheet    Wound/Ulcer #: 1    Post Debridement Measurements:  Wound/Ulcer Descriptions are Pre Debridement except measurements:      Percent of Wound/Ulcer Debrided: 100%    Total Surface Area Debrided:  0.6 sq cm     Wound 08/27/18 Burn Abdomen Quadrant; Left Wound 1 - Left lower quadrant - Burn (Active)   Number of days: 990       Wound 04/29/21 Arm Proximal;Right wound #1 right forearm cluster (r/t dog bite 4/12/2021) (Active)   Wound Image   05/13/21 1035   Wound Etiology Burn 05/13/21 1035   Dressing Status Old drainage noted 05/13/21 1035   Wound Cleansed Soap and water 05/13/21 1035   Dressing/Treatment Gauze dressing/dressing sponge;Packing 05/13/21 1035   Wound Length (cm) 1.2 cm 05/13/21 1035   Wound Width (cm) 0.5 cm 05/13/21 1035   Wound Depth (cm) 0.8 cm 05/13/21 1035   Wound Surface Area (cm^2) 0.6 cm^2 05/13/21 1035   Change in Wound Size % (l*w) 77.78 05/13/21 1035   Wound Volume (cm^3) 0.48 cm^3 05/13/21 1035   Wound Healing % -78 05/13/21 1035   Post-Procedure Length (cm) 1.2 cm 05/13/21 1105   Post-Procedure Width (cm) 0.5 cm 05/13/21 1105   Post-Procedure Depth (cm) 0.8 cm 05/13/21 1105   Post-Procedure Surface Area (cm^2) 0.6 cm^2 05/13/21 1105   Post-Procedure Volume (cm^3) 0.48 cm^3 05/13/21 1105   Distance Tunneling (cm) 0 cm 05/04/21 0941   Tunneling Position ___ O'Clock 0 05/04/21 0941   Undermining Starts ___ O'Clock 6 05/13/21 1035   Undermining Ends___ O'Clock 12 05/13/21 1035   Undermining Maxium Distance (cm) 1.5 05/13/21 1035   Wound Assessment Pink/red;Slough 05/13/21 1035   Drainage Amount Moderate 05/13/21 1035   Drainage Description Serosanguinous; Yellow 05/13/21 1035   Odor None 05/13/21 1035   Jeanne-wound Assessment Induration 05/13/21 1035   Margins Defined edges 05/13/21 1035   Wound Thickness Description not for Pressure Injury Full thickness 05/13/21 1035   Number of days: 14            Diabetic/Pressure/Non Pressure Ulcers only:  Ulcer: N/A      Estimated Blood Loss:  Minimal    Hemostasis Achieved:  by pressure    Procedural Pain:  2  / 10     Post Procedural Pain:  0 / 10     Response to treatment:  Well tolerated by patient., With complaints of pain. Plan:     Problem List Items Addressed This Visit     Abscess of right arm    * (Principal) Skin ulcer of upper arm, with fat layer exposed ([address]          Treatment Note please see attached Discharge Instructions    In my professional opinion this patient would benefit from HBO Therapy: No    Written patient dismissal instructions given to patient and signed by patient or POA. Ms. Sree Smith admits to poor eating, I was able to give her coupons and detailed information on nutritional products to boost her healing.     Electronically signed by CHANELLE Flores CNP on 5/14/2021 at 7:57 AM

## 2021-05-19 ENCOUNTER — ANTI-COAG VISIT (OUTPATIENT)
Dept: INTERNAL MEDICINE | Age: 71
End: 2021-05-19
Payer: MEDICARE

## 2021-05-19 DIAGNOSIS — Z79.01 ANTICOAGULATED ON COUMADIN: Primary | ICD-10-CM

## 2021-05-19 LAB — INR BLD: 1.8

## 2021-05-19 PROCEDURE — 93793 ANTICOAG MGMT PT WARFARIN: CPT | Performed by: INTERNAL MEDICINE

## 2021-05-19 NOTE — PROGRESS NOTES
HOME MONITORING REPORT    INR today:   Results for orders placed or performed in visit on 05/19/21   Protime-INR   Result Value Ref Range    INR 1.80        INR Goal: 2.0-3.0    Dosing Plan  As of 5/19/2021    TTR:  20.8 % (3 y)   Full warfarin instructions:  5/19: 15 mg; Otherwise 7.5 mg every Mon, Wed, Fri; 15 mg all other days              PLAN: Advised patient/caregiver to increase tonight's dose to 15 mg, then  continue current dose and recheck in one week. Patient/Caregiver voiced understanding      Electronically signed by Alexandru Meraz MD on 5/20/2021 at 5:03 AM    I have reviewed nursing plan for Coumadin management and agree with plan.

## 2021-05-20 ENCOUNTER — CARE COORDINATION (OUTPATIENT)
Dept: CARE COORDINATION | Age: 71
End: 2021-05-20

## 2021-05-20 NOTE — ACP (ADVANCE CARE PLANNING)
Advance Care Planning   Healthcare Decision Maker:    Primary Decision Maker: Allegra Erica - Child - [phone]    Click here to complete Healthcare Decision Makers including selection of the Healthcare Decision Maker Relationship (ie \"Primary\"). Today we discussed Healthcare Decision Makers. The patient is considering options.        If the relationship to the patient does NOT follow our state's Next of Kin hierarchy, the patient MUST complete an ACP Document to allow him/her to act on the patient's behalf.:

## 2021-05-20 NOTE — CARE COORDINATION
information  Confidence: 9/10  Anticipated Goal Completion Date: 5/14/21        Self Monitoring         Self-Monitored Blood Glucose - I will check my blood sugar Fasting blood sugar  I will notify my provider of any trends of increasing or decreasing blood sugars over a 1 month period. I will notify my provider if I have any blood sugar readings less than 70 more than 2 times a month. Blood Pressure - I will take my blood pressure as directed - Every other day  I will notify my provider of any trends of increasing or decreasing blood pressures over a month period of time. I will notify my provider of any changes in blood pressure associated with symptoms of dizziness, falls, passing out, headache, confusion/change in mental status. None Recently Recorded    Barriers: lack of motivation  Plan for overcoming my barriers:   Pt willing to review FBS and BP with ACM. Pt willing to keep log for review by her PCP as indicated. Family willing to assist patient as needed to check her vitals  Confidence: 8/10  Anticipated Goal Completion Date: 6/30/21              Prior to Admission medications    Medication Sig Start Date End Date Taking?  Authorizing Provider   sodium hypochlorite (DAKINS) 0.125 % SOLN external solution Moisten gauze apply to wound twice daily 4/29/21   Flordia Coil, APRN - CNP   CPAP Machine MISC Inhale 1 each into the lungs nightly    Historical Provider, MD   enoxaparin (LOVENOX) 100 MG/ML injection 1ml BID  Patient taking differently: Indications: stated back on coumadin now, this was for endoscopy 1ml BID 4/12/21   Mario Cohn MD   Cyanocobalamin 1000 MCG/ML KIT Inject as directed every 30 days    Historical Provider, MD   gabapentin (NEURONTIN) 100 MG capsule TAKE 1 CAPSULE BY MOUTH THREE TIMES DAILY 3/18/21 6/16/21  Mario Cohn MD   pantoprazole (PROTONIX) 40 MG tablet TAKE 1 TABLET BY MOUTH TWICE DAILY BEFORE MEAL(S) 3/18/21   Mario Cohn MD   ferrous gluconate tablet Take 81 mg by mouth daily    Historical Provider, MD       Future Appointments   Date Time Provider Jose Monse   5/27/2021  9:30 AM CHANELLE Rae CNP [facility] L[facility] [address] [facility] [address]   6/1/2021 11:15 AM [facility] DEXA RM 1 [facility] WOMENS [facility]   6/1/2021 11:40 AM [facility] MAMMO RM 2 [facility]   6/24/2021  9:40 AM SCHEDULE, [facility] MED ONC MA [facility] MED ONC [facility] [place]   6/24/2021 10:00 AM CHANELLE Daley N PAD HEMONC [facility]   7/7/2021  9:45 AM Zbigniew Thurston MD [facility]      and   Diabetes Assessment    Medic Alert ID: No  Meal Planning: None   How often do you test your blood sugar?: No Testing (Comment: A1c 5.6, takes no medications for dx)   Do you have barriers with adherence to non-pharmacologic self-management interventions?  (Nutrition/Exercise/Self-Monitoring): Yes   Have you ever had to go to the ED for symptoms of low blood sugar?: No       No patient-reported symptoms

## 2021-05-25 ENCOUNTER — TELEPHONE (OUTPATIENT)
Dept: INTERNAL MEDICINE | Age: 71
End: 2021-05-25

## 2021-05-25 NOTE — TELEPHONE ENCOUNTER
Joelle Krabbe with [facility] [address] [place] [address] [facility] called saying that she was faxing over some forms that need to be filled out that Dr. Glenn Resendiz needs to fill out because she approved genetic testing last year for this pt. Forms placed for Dr. Glenn Resendiz to go through and fill out.

## 2021-05-27 ENCOUNTER — CARE COORDINATION (OUTPATIENT)
Dept: CARE COORDINATION | Age: 71
End: 2021-05-27

## 2021-05-28 RX ORDER — PANTOPRAZOLE SODIUM 40 MG/1
TABLET, DELAYED RELEASE ORAL
Qty: 30 TABLET | Refills: 0 | Status: CANCELLED | OUTPATIENT
Start: 2021-05-28

## 2021-06-01 ENCOUNTER — TELEPHONE (OUTPATIENT)
Dept: INTERNAL MEDICINE | Age: 71
End: 2021-06-01

## 2021-06-01 ENCOUNTER — ANTI-COAG VISIT (OUTPATIENT)
Dept: INTERNAL MEDICINE | Age: 71
End: 2021-06-01
Payer: MEDICARE

## 2021-06-01 ENCOUNTER — APPOINTMENT (OUTPATIENT)
Dept: WOMENS IMAGING | Age: 71
End: 2021-06-01
Payer: MEDICARE

## 2021-06-01 ENCOUNTER — HOSPITAL ENCOUNTER (OUTPATIENT)
Dept: WOMENS IMAGING | Age: 71
Discharge: HOME | End: 2021-06-01
Payer: MEDICARE

## 2021-06-01 DIAGNOSIS — Z79.01 ANTICOAGULATED ON COUMADIN: Primary | ICD-10-CM

## 2021-06-01 DIAGNOSIS — Z12.31 ENCOUNTER FOR SCREENING MAMMOGRAM FOR MALIGNANT NEOPLASM OF BREAST: ICD-10-CM

## 2021-06-01 LAB — INR BLD: 2.9

## 2021-06-01 PROCEDURE — 77067 SCR MAMMO BI INCL CAD: CPT

## 2021-06-01 PROCEDURE — 93793 ANTICOAG MGMT PT WARFARIN: CPT | Performed by: INTERNAL MEDICINE

## 2021-06-01 RX ORDER — PANTOPRAZOLE SODIUM 40 MG/1
TABLET, DELAYED RELEASE ORAL
Qty: 180 TABLET | Refills: 0 | Status: SHIPPED | OUTPATIENT
Start: 2021-06-01 | End: 2021-12-30

## 2021-06-01 RX ORDER — WARFARIN SODIUM 7.5 MG/1
TABLET ORAL
Qty: 180 TABLET | Refills: 3 | OUTPATIENT
Start: 2021-06-01 | End: 2021-08-31 | Stop reason: SDUPTHER

## 2021-06-01 RX ORDER — WARFARIN SODIUM 7.5 MG/1
TABLET ORAL
Qty: 180 TABLET | Refills: 3 | Status: SHIPPED | OUTPATIENT
Start: 2021-06-01 | End: 2021-06-01 | Stop reason: CLARIF

## 2021-06-01 NOTE — PROGRESS NOTES
HOME MONITORING REPORT    INR today:   Results for orders placed or performed in visit on 06/01/21   Protime-INR   Result Value Ref Range    INR 2.90        INR Goal: 2.0-3.0    Dosing Plan  As of 6/1/2021    TTR:  21.6 % (3 y)   Full warfarin instructions:  7.5 mg every Mon, Wed, Fri; 15 mg all other days          Patient states she had run out of warfarin and has not had it since last Friday. I sent a message to Dr. Lauren De Jesus staff. Advised patient to restart asap and wait one week before re-checking. PLAN: Advised patient/caregiver to continue current dose and recheck in one week. Patient/Caregiver voiced understanding  I have reviewed nursing plan for Coumadin management and agree with plan.

## 2021-06-01 NOTE — TELEPHONE ENCOUNTER
Jessica Machado from [facility] needed clarification on Coumadin orders. Per Coumadin clinic note today pt takes 7.5mg on Monday and 15mg all other days. VO called to Jessica Machado.

## 2021-06-03 ENCOUNTER — HOSPITAL ENCOUNTER (OUTPATIENT)
Dept: WOUND CARE | Age: 71
Discharge: HOME | End: 2021-06-03
Payer: MEDICARE

## 2021-06-03 ENCOUNTER — ANTI-COAG VISIT (OUTPATIENT)
Dept: INTERNAL MEDICINE | Age: 71
End: 2021-06-03
Payer: MEDICARE

## 2021-06-03 ENCOUNTER — NURSE ONLY (OUTPATIENT)
Dept: INTERNAL MEDICINE | Age: 71
End: 2021-06-03
Payer: MEDICARE

## 2021-06-03 VITALS
HEART RATE: 75 BPM | SYSTOLIC BLOOD PRESSURE: 179 MMHG | WEIGHT: 241 LBS | RESPIRATION RATE: 20 BRPM | HEIGHT: 67 IN | BODY MASS INDEX: 37.83 KG/M2 | DIASTOLIC BLOOD PRESSURE: 99 MMHG | TEMPERATURE: 98.2 F

## 2021-06-03 DIAGNOSIS — E53.8 B12 DEFICIENCY: Primary | ICD-10-CM

## 2021-06-03 DIAGNOSIS — Z79.01 ANTICOAGULATED ON COUMADIN: Primary | ICD-10-CM

## 2021-06-03 DIAGNOSIS — L02.413 ABSCESS OF RIGHT ARM: ICD-10-CM

## 2021-06-03 DIAGNOSIS — L98.492 SKIN ULCER OF UPPER ARM, WITH FAT LAYER EXPOSED (HCC): ICD-10-CM

## 2021-06-03 LAB — INR BLD: 1.8

## 2021-06-03 PROCEDURE — 93793 ANTICOAG MGMT PT WARFARIN: CPT | Performed by: INTERNAL MEDICINE

## 2021-06-03 PROCEDURE — 10060 I&D ABSCESS SIMPLE/SINGLE: CPT | Performed by: NURSE PRACTITIONER

## 2021-06-03 PROCEDURE — 10060 I&D ABSCESS SIMPLE/SINGLE: CPT

## 2021-06-03 RX ORDER — CYANOCOBALAMIN 1000 UG/ML
1000 INJECTION INTRAMUSCULAR; SUBCUTANEOUS ONCE
Status: COMPLETED | OUTPATIENT
Start: 2021-06-03 | End: 2021-06-03

## 2021-06-03 RX ORDER — LIDOCAINE HYDROCHLORIDE 10 MG/ML
5 INJECTION, SOLUTION EPIDURAL; INFILTRATION; INTRACAUDAL; PERINEURAL ONCE
Status: DISCONTINUED | OUTPATIENT
Start: 2021-06-03 | End: 2021-06-05 | Stop reason: HOSPADM

## 2021-06-03 RX ADMIN — CYANOCOBALAMIN 1000 MCG: 1000 INJECTION INTRAMUSCULAR; SUBCUTANEOUS at 09:52

## 2021-06-03 ASSESSMENT — PAIN DESCRIPTION - PROGRESSION: CLINICAL_PROGRESSION: NOT CHANGED

## 2021-06-03 ASSESSMENT — PAIN SCALES - GENERAL: PAINLEVEL_OUTOF10: 7

## 2021-06-03 ASSESSMENT — PAIN DESCRIPTION - ONSET: ONSET: ON-GOING

## 2021-06-03 ASSESSMENT — PAIN DESCRIPTION - ORIENTATION: ORIENTATION: RIGHT

## 2021-06-03 ASSESSMENT — PAIN DESCRIPTION - LOCATION: LOCATION: ARM

## 2021-06-03 ASSESSMENT — PAIN DESCRIPTION - DESCRIPTORS: DESCRIPTORS: ACHING;THROBBING

## 2021-06-03 ASSESSMENT — PAIN DESCRIPTION - PAIN TYPE: TYPE: ACUTE PAIN

## 2021-06-03 ASSESSMENT — PAIN DESCRIPTION - FREQUENCY: FREQUENCY: CONTINUOUS

## 2021-06-03 NOTE — PLAN OF CARE
Problem: Wound:  Goal: Signs of wound healing will improve  Description: Signs of wound healing will improve  Outcome: Ongoing   Wound has no sign of infection and measures smaller. Problem: Blood Glucose:  Goal: Ability to maintain appropriate glucose levels will improve  Description: Ability to maintain appropriate glucose levels will improve  Outcome: Ongoing     Problem: Falls - Risk of:  Goal: Will remain free from falls  Description: Will remain free from falls  Outcome: Ongoing  Patient has not had any falls since last visit.   Goal: Absence of physical injury  Description: Absence of physical injury  Outcome: Ongoing     Problem: Pain:  Goal: Pain level will decrease  Description: Pain level will decrease  Outcome: Ongoing  Goal: Control of acute pain  Description: Control of acute pain  Outcome: Ongoing  Goal: Control of chronic pain  Description: Control of chronic pain  Outcome: Ongoing

## 2021-06-03 NOTE — PROGRESS NOTES
HOME MONITORING REPORT    INR today:   Results for orders placed or performed in visit on 06/03/21   Protime-INR   Result Value Ref Range    INR 1.80        INR Goal: 2.0-3.0    Dosing Plan  As of 6/3/2021    TTR:  21.7 % (3 y)   Full warfarin instructions:  7.5 mg every Mon, Wed, Fri; 15 mg all other days              PLAN:  Patient states she had been out of warfarin and re-started it on 6/1/21. She took 15 mg Tuesday, Wednesday and will take 15 tonight. Then she will alternate 7.5 mg and 15 mg until 6/8 and recheck her INR. Patient/Caregiver voiced understanding    I have reviewed nursing plan for Coumadin management and agree with plan.
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
## 2021-06-08 ENCOUNTER — CARE COORDINATION (OUTPATIENT)
Dept: CARE COORDINATION | Age: 71
End: 2021-06-08

## 2021-06-08 NOTE — CARE COORDINATION
my barriers:   Pt willing to review FBS and BP with ACM. Pt willing to keep log for review by her PCP as indicated. Family willing to assist patient as needed to check her vitals  Confidence: 8/10  Anticipated Goal Completion Date: 6/30/21              Prior to Admission medications    Medication Sig Start Date End Date Taking? Authorizing Provider   pantoprazole (PROTONIX) 40 MG tablet TAKE 1 TABLET BY MOUTH TWICE DAILY BEFORE MEAL(S) 6/1/21  Yes Miranda Carter MD   warfarin (COUMADIN) 7.5 MG tablet 7.5mg on Mondays and 15mg all other days  Patient taking differently: Take 15 mg by mouth daily  6/1/21  Yes Miranda Carter MD   sodium hypochlorite (DAKINS) 0.125 % SOLN external solution Moisten gauze apply to wound twice daily 4/29/21  Yes CHANELLE Lombardi - CNP   CPAP Machine MISC Inhale 1 each into the lungs nightly   Yes Historical Provider, MD   Cyanocobalamin 1000 MCG/ML KIT Inject as directed every 30 days   Yes Historical Provider, MD   gabapentin (NEURONTIN) 100 MG capsule TAKE 1 CAPSULE BY MOUTH THREE TIMES DAILY 3/18/21 6/16/21 Yes Miranda Carter MD   ferrous gluconate (FERGON) 240 (27 Fe) MG tablet Take 1 tablet by mouth 2 times daily 3/18/21  Yes Miranda Carter MD   calcitRIOL (ROCALTROL) 0.25 MCG capsule Take 0.25 mcg by mouth daily   Yes Sabrina Berger MD   bumetanide (BUMEX) 2 MG tablet Take 1 tablet by mouth daily 3/18/21  Yes Miranda Carter MD   levothyroxine (SYNTHROID) 100 MCG tablet Take 1 tablet by mouth Daily 3/18/21  Yes Miranda Carter MD   lisinopril (PRINIVIL;ZESTRIL) 40 MG tablet Take 1 tablet by mouth once daily 3/18/21  Yes Miranda Carter MD   baclofen (LIORESAL) 10 MG tablet Take 1 tablet by mouth 3 times daily As needed for hip pain 3/18/21  Yes Miranda Carter MD   escitalopram (LEXAPRO) 20 MG tablet Take 1 tablet by mouth daily 12/14/20  Yes Miranda Carter MD   fexofenadine (ALLEGRA) 180 MG tablet Take 1 tablet by mouth daily Indications:  Allergic Rhinitis 12/14/20  Yes Isauro Carr MD   tiotropium (SPIRIVA RESPIMAT) 2.5 MCG/ACT AERS inhaler Inhale 2 puffs into the lungs daily 11/23/20  Yes Isauro Carr MD   potassium chloride (KLOR-CON) 10 MEQ extended release tablet Take 10 mEq by mouth daily  10/27/20  Yes Historical Provider, MD   calcipotriene (DOVONEX) 0.005 % cream Use topically as needed 7/7/20  Yes Isauro Carr MD   clobetasol (TEMOVATE) 0.05 % cream Apply topically 2 times daily.  7/7/20  Yes Isauro Carr MD   ondansetron (ZOFRAN) 4 MG tablet Take 1 tablet by mouth daily as needed for Nausea or Vomiting 1/31/20  Yes Isauro Carr MD   Multiple Vitamins-Minerals (CENTRUM ADULTS) TABS Take 1 tablet by mouth daily   Yes Historical Provider, MD   aspirin 81 MG tablet Take 81 mg by mouth daily   Yes Historical Provider, MD       Future Appointments   Date Time Provider Jose Monse   6/17/2021  8:15 AM CHANELLE Duarte - CNP L LMP [facility]   6/24/2021  9:40 AM SCHEDULE, L MED ONC MA L MED ONC [facility]   6/24/2021 10:00 AM CHANELLE [facility]   7/7/2021  9:45 AM Isauro Carr MD [facility]

## 2021-06-10 ENCOUNTER — ANTI-COAG VISIT (OUTPATIENT)
Dept: INTERNAL MEDICINE | Age: 71
End: 2021-06-10
Payer: MEDICARE

## 2021-06-10 DIAGNOSIS — Z79.01 ANTICOAGULATED ON COUMADIN: Primary | ICD-10-CM

## 2021-06-10 LAB — INR BLD: 2.9

## 2021-06-10 PROCEDURE — 93793 ANTICOAG MGMT PT WARFARIN: CPT | Performed by: INTERNAL MEDICINE

## 2021-06-10 NOTE — PROGRESS NOTES
HOME MONITORING REPORT    INR today:   Results for orders placed or performed in visit on 06/10/21   Protime-INR   Result Value Ref Range    INR 2.90        INR Goal: 2.0-3.0    Dosing Plan  As of 6/10/2021    TTR:  22.0 % (3.1 y)   Full warfarin instructions:  7.5 mg every Mon, Wed, Fri; 15 mg all other days              PLAN: Advised patient/caregiver to continue current dose and recheck in one week. Patient/Caregiver voiced understanding    Electronically signed by Miranda Carter MD on 6/10/2021 at 11:24 AM    I have reviewed nursing plan for Coumadin management and agree with plan.

## 2021-06-17 ENCOUNTER — HOSPITAL ENCOUNTER (OUTPATIENT)
Dept: WOUND CARE | Age: 71
Discharge: HOME | End: 2021-06-17
Payer: MEDICARE

## 2021-06-17 VITALS
WEIGHT: 241 LBS | SYSTOLIC BLOOD PRESSURE: 175 MMHG | HEIGHT: 67 IN | HEART RATE: 64 BPM | RESPIRATION RATE: 18 BRPM | TEMPERATURE: 97 F | BODY MASS INDEX: 37.83 KG/M2 | DIASTOLIC BLOOD PRESSURE: 99 MMHG

## 2021-06-17 DIAGNOSIS — L98.492 SKIN ULCER OF UPPER ARM, WITH FAT LAYER EXPOSED (HCC): ICD-10-CM

## 2021-06-17 DIAGNOSIS — L02.413 ABSCESS OF RIGHT ARM: ICD-10-CM

## 2021-06-17 DIAGNOSIS — E11.21 TYPE II DIABETES MELLITUS WITH NEPHROPATHY (HCC): Primary | ICD-10-CM

## 2021-06-17 LAB — INR BLD: 3.5

## 2021-06-17 PROCEDURE — 97597 DBRDMT OPN WND 1ST 20 CM/<: CPT | Performed by: NURSE PRACTITIONER

## 2021-06-17 PROCEDURE — 97597 DBRDMT OPN WND 1ST 20 CM/<: CPT

## 2021-06-17 RX ORDER — LIDOCAINE HYDROCHLORIDE 20 MG/ML
JELLY TOPICAL PRN
Status: DISCONTINUED | OUTPATIENT
Start: 2021-06-17 | End: 2021-06-19 | Stop reason: HOSPADM

## 2021-06-17 ASSESSMENT — PAIN DESCRIPTION - PAIN TYPE: TYPE: ACUTE PAIN

## 2021-06-17 ASSESSMENT — PAIN SCALES - GENERAL: PAINLEVEL_OUTOF10: 5

## 2021-06-17 ASSESSMENT — PAIN DESCRIPTION - ORIENTATION: ORIENTATION: RIGHT

## 2021-06-17 ASSESSMENT — PAIN DESCRIPTION - FREQUENCY: FREQUENCY: CONTINUOUS

## 2021-06-17 ASSESSMENT — PAIN DESCRIPTION - ONSET: ONSET: ON-GOING

## 2021-06-17 ASSESSMENT — PAIN DESCRIPTION - DESCRIPTORS: DESCRIPTORS: NAGGING

## 2021-06-17 ASSESSMENT — PAIN DESCRIPTION - LOCATION: LOCATION: ARM

## 2021-06-17 NOTE — PROGRESS NOTES
[address]   Progress Note and Procedure Note      Parish Melendrez  MEDICAL RECORD NUMBER:  [id number]  AGE: 69 y.o. GENDER: female  : 1950  EPISODE DATE:  2021    Subjective:     Chief Complaint   Patient presents with    Wound Check     Patient states wound is improving         HISTORY of PRESENT ILLNESS HPI     Parish Melendrez is a 69 y.o. female who presents today for wound/ulcer evaluation.    History of Wound Context: right arm wound  Ulcer Identification:  Ulcer Type: surgical  Contributing Factors: diabetes and malnutrition    Wound: N/A        PAST MEDICAL HISTORY        Diagnosis Date    Abdominal pain     Abnormal EKG     Acute sinusitis     Allergic reaction to spider bite     Anemia     Anticoagulated     Anxiety     Arrhythmia     Asthmatic bronchitis without complication     Ataxic gait     Lyons's esophagus     Bowel obstruction (HCC)     Burn of abdomen wall, second degree, initial encounter 2018    Callus     Cardiac pacemaker     CKD (chronic kidney disease) stage 2, GFR 60-89 ml/min     Coat's syndrome     Coat's syndrome     both eyes    Depression     Diabetes mellitus type 2 in nonobese (HCC)     Diabetic nephropathy (HCC)     Disequilibrium     Dizziness     DVT (deep venous thrombosis) (HCC)     Exudative retinopathy     Fibromyalgia     Fibromyositis     Gastric ulcer     GERD (gastroesophageal reflux disease)     History of gastric bypass     Hx of lupus anticoagulant disorder     Hypertension     Hypothyroidism     Intermittent claudication (HCC)     Intestinal obstruction (HCC)     Iron deficiency     Left-sided weakness     Low vitamin D level     Lupus (HCC)     Menopause     Obesity     Osteoarthritis     Osteoporosis     Other iron deficiency anemias     Palliative care patient 2020    Pernicious anemia     PUPP (pruritic urticarial papules and plaques of pregnancy)     Right leg numbness     Right sided sciatica     Sarcoidosis     with liver involvement    Sciatica     Secondary hyperparathyroidism (HCC)     Shingles     Shortness of breath     Sleep apnea     Stomach ulcer     Syncope     Visual loss, one eye        PAST SURGICAL HISTORY    Past Surgical History:   Procedure Laterality Date    APPENDECTOMY      CARDIAC CATHETERIZATION  10/21/13  [facility]    EF over 60%    CHOLECYSTECTOMY      COLONOSCOPY  02/2010    negative    COLONOSCOPY  02/22/2010    Dr Raquel Solorzano    COLONOSCOPY  04/01/2016    Dr LAKHWINDER Horvath-internal hemorrhoids, 5 yr recall    COLONOSCOPY N/A 05/07/2021    Dr Fei Padilla looping/tortuosity throughout the left colon, BE=Normal    DILATATION, ESOPHAGUS      EYE SURGERY      Cyst on Right eye    EYE SURGERY      GASTRIC BYPASS SURGERY      GASTRIC BYPASS SURGERY      HERNIA REPAIR      HYSTERECTOMY      Complete    HYSTERECTOMY      Partial - because had a tubal pregnancy.      INCONTINENCE SURGERY      Bladder Sling    INFECTED SKIN DEBRIDEMENT Right     dog bite right forearm    OTHER SURGICAL HISTORY      IVC filter    PACEMAKER INSERTION      PACEMAKER PLACEMENT      medtronic    [place] TOTAL KNEE ARTHROPLASTY Right 03/26/2018    TOTAL KNEE REPLACEMENT COMPLEX PRIMARY performed by Ayala Sheriff MD at [address] [place] [address]      SPLENECTOMY      KRISTEL AND BSO      TONSILLECTOMY AND ADENOIDECTOMY      TOTAL KNEE ARTHROPLASTY      UPPER GASTROINTESTINAL ENDOSCOPY  12/2011    gerd s/p gastric bypass    UPPER GASTROINTESTINAL ENDOSCOPY  02/2014    normal s/p gastric bypass    UPPER GASTROINTESTINAL ENDOSCOPY  02/2010    biopsy neg Barretts, chronic reflux esophagitis s/p gastric bypass    UPPER GASTROINTESTINAL ENDOSCOPY  07/2006    unremarkable s/p gastric bypass    UPPER GASTROINTESTINAL ENDOSCOPY  08/10/2015    Dr Phong Obrien   [facility] ENDOSCOPY N/A 04/01/2016    Dr. Elmer Bella: H Pylori(-), HH, o/w normal    UPPER GASTROINTESTINAL ENDOSCOPY N/A 05/07/2021    Dr Estella Henriquez hiatal hernia, previous gastric bypass surgery, gastritis    VENA CAVA FILTER PLACEMENT Right 2003       FAMILY HISTORY    Family History   Problem Relation Age of Onset    Uterine Cancer Mother     Cervical Cancer Mother     Coronary Art Dis Mother     Heart Disease Father     Lung Cancer Father     Other Father         renal failure    Colon Cancer Brother     Colon Polyps Brother     Uterine Cancer Maternal Grandmother     Cervical Cancer Maternal Grandmother     Diabetes Maternal Grandmother     Cervical Cancer Sister     Other Sister         fibromyalgia    Diabetes Paternal Aunt     Breast Cancer Maternal Cousin     Esophageal Cancer Neg Hx     Liver Cancer Neg Hx     Liver Disease Neg Hx     Stomach Cancer Neg Hx     Rectal Cancer Neg Hx        SOCIAL HISTORY    Social History     Tobacco Use    Smoking status: Never Smoker    Smokeless tobacco: Never Used   Vaping Use    Vaping Use: Never used   Substance Use Topics    Alcohol use: No    Drug use: No       ALLERGIES    Allergies   Allergen Reactions    Insect Extract Allergy Skin Test Anaphylaxis     Bee stings    Lactose Intolerance (Gi)     Prednisone      Headache and upset stomach    Zanaflex [Tizanidine Hcl]      Passed out lost control of body functions    Lortab [Hydrocodone-Acetaminophen] Nausea And Vomiting     Sweats, weak, nausea and vomiting    Other Nausea And Vomiting     Opiates------sweating, weak        Oxycodone-Acetaminophen Nausea And Vomiting     Sweating and vomiting     Ultram [Tramadol Hcl] Nausea And Vomiting     Sweating, weak, nausea and vomiting         MEDICATIONS    Current Outpatient Medications on File Prior to Encounter   Medication Sig Dispense Refill    pantoprazole (PROTONIX) 40 MG tablet TAKE 1 TABLET BY MOUTH TWICE DAILY BEFORE MEAL(S) 180 tablet 0    sodium hypochlorite (DAKINS) 0.125 % SOLN external solution Moisten gauze apply to wound twice daily 1000 mL 2    Cyanocobalamin 1000 MCG/ML KIT Inject as directed every 30 days      gabapentin (NEURONTIN) 100 MG capsule TAKE 1 CAPSULE BY MOUTH THREE TIMES DAILY 270 capsule 0    ferrous gluconate (FERGON) 240 (27 Fe) MG tablet Take 1 tablet by mouth 2 times daily 180 tablet 2    calcitRIOL (ROCALTROL) 0.25 MCG capsule Take 0.25 mcg by mouth daily      bumetanide (BUMEX) 2 MG tablet Take 1 tablet by mouth daily 30 tablet 5    levothyroxine (SYNTHROID) 100 MCG tablet Take 1 tablet by mouth Daily 90 tablet 3    lisinopril (PRINIVIL;ZESTRIL) 40 MG tablet Take 1 tablet by mouth once daily 90 tablet 1    escitalopram (LEXAPRO) 20 MG tablet Take 1 tablet by mouth daily 90 tablet 3    fexofenadine (ALLEGRA) 180 MG tablet Take 1 tablet by mouth daily Indications: Allergic Rhinitis 90 tablet 3    tiotropium (SPIRIVA RESPIMAT) 2.5 MCG/ACT AERS inhaler Inhale 2 puffs into the lungs daily 1 Inhaler 5    potassium chloride (KLOR-CON) 10 MEQ extended release tablet Take 10 mEq by mouth daily       Multiple Vitamins-Minerals (CENTRUM ADULTS) TABS Take 1 tablet by mouth daily      aspirin 81 MG tablet Take 81 mg by mouth daily      warfarin (COUMADIN) 7.5 MG tablet 7.5mg on Mondays and 15mg all other days (Patient taking differently: Take 15 mg by mouth daily ) 180 tablet 3    CPAP Machine MISC Inhale 1 each into the lungs nightly      baclofen (LIORESAL) 10 MG tablet Take 1 tablet by mouth 3 times daily As needed for hip pain 10 tablet 1    calcipotriene (DOVONEX) 0.005 % cream Use topically as needed 3 Tube 3    clobetasol (TEMOVATE) 0.05 % cream Apply topically 2 times daily.  3 Tube 3    ondansetron (ZOFRAN) 4 MG tablet Take 1 tablet by mouth daily as needed for Nausea or Vomiting 30 tablet 0     Current Facility-Administered Medications on File Prior to Encounter   Medication Dose Route Frequency Provider Last Rate Last Admin    cyanocobalamin injection 1,000 mcg  1,000 mcg Intramuscular Once Annalisa Su MD           REVIEW OF SYSTEMS    Pertinent items are noted in HPI.     Objective:      BP (!) 175/99   Pulse 64   Temp 97 °F (36.1 °C) (Temporal)   Resp 18   Ht 5' 7\" (1.702 m)   Wt 241 lb (109.3 kg)   BMI 37.75 kg/m²     Wt Readings from Last 3 Encounters:   06/17/21 241 lb (109.3 kg)   06/03/21 241 lb (109.3 kg)   05/13/21 241 lb (109.3 kg)       PHYSICAL EXAM    General Appearance: alert and oriented to person, place and time, well developed and well- nourished, in no acute distress  Skin: warm and dry, no rash or erythema  Head: normocephalic and atraumatic  Eyes: pupils equal, round, and reactive to light, extraocular eye movements intact, conjunctivae normal  ENT: tympanic membrane, external ear and ear canal normal bilaterally, nose without deformity, nasal mucosa and turbinates normal without polyps  Neck: supple and non-tender without mass, no thyromegaly or thyroid nodules, no cervical lymphadenopathy  Pulmonary/Chest: clear to auscultation bilaterally- no wheezes, rales or rhonchi, normal air movement, no respiratory distress  Extremities: no cyanosis, clubbing or edema  Musculoskeletal: normal range of motion, no joint swelling, deformity or tenderness  Neurologic: reflexes normal and symmetric, no cranial nerve deficit, gait, coordination and speech normal      Assessment:      Patient Active Problem List   Diagnosis Code    GERD (gastroesophageal reflux disease) K21.9    History of gastric bypass Z98.84    Family history of colon cancer Z80.0    Stable angina ([facility] I20.8    Encounter for current long-term use of anticoagulants Z79.01    Primary osteoarthritis of right knee M17.11    Arthritis of knee M17.10    Essential hypertension I10    Hyperglycemia R73.9    MER (obstructive sleep apnea) G47.33    Iron deficiency anemia D50.9    Restrictive airway disease J98.4    DVT, lower extremity, proximal, acute, unspecified laterality ([facility] I82.4Y9    H/O systemic lupus erythematosus (SLE) ([facility] M32.9    Anticoagulated on Coumadin Z79.01    Postmenopausal osteoporosis M81.0    Type II diabetes mellitus with nephropathy ([facility]) E11.21    Pacemaker Z95.0    History of DVT (deep vein thrombosis) Z86.718    Lupus anticoagulant disorder ([facility]) D68.62    History of pulmonary embolism Z86.711    Thrombocytopenia ([facility]) D69.6    Hypothyroidism E03.9    Morbidly obese ([facility]) E66.01    Asthmatic bronchitis without complication J67.639    Gastroenteritis B04.9    Colic Q94.93    Abdominal pain R10.9    Intractable nausea and vomiting R11.2    Severe episode of recurrent major depressive disorder, without psychotic features [facility] F33.2    Lower abdominal pain R10.30    Chronic heartburn R12    S/P gastric bypass Z98.84    Dog bite W54. 0XXA    Cellulitis of right upper extremity L03. 113    Abscess of right arm L02.413    Soft tissue infection L08.9    Skin ulcer of upper arm, with fat layer exposed ([facility] L98.492        Procedure Note  Indications:  Based on my examination of this patient's wound(s)/ulcer(s) today, debridement is required to promote healing and evaluate the wound base. Performed by: CHANELLE Morales CNP    Consent obtained:  Yes    Time out taken:  Yes    Pain Control:         Debridement:Non-excisional Debridement    Using curette the wound(s)/ulcer(s) was/were sharply debrided down through and including the removal of epidermis and dermis. Devitalized Tissue Debrided:  fibrin, biofilm, slough and exudate    Pre Debridement Measurements:  Are located in the Wound/Ulcer Documentation Flow Sheet    Wound/Ulcer #: 1    Post Debridement Measurements:  Wound/Ulcer Descriptions are Pre Debridement except measurements:      Percent of Wound/Ulcer Debrided: 100%    Total Surface Area Debrided:  0.06 sq cm     Wound 08/27/18 Burn Abdomen Quadrant; Left Wound 1 - Left lower quadrant - Burn (Active)   Number of days: 1024       Wound 04/29/21 Arm Proximal;Right wound #1 right forearm cluster (r/t dog bite 4/12/2021) (Active)   Wound Image   06/17/21 0852   Wound Etiology Traumatic 06/17/21 0852   Dressing Status Old drainage noted 06/17/21 0852   Wound Cleansed Not Cleansed 06/17/21 0852   Dressing/Treatment Gauze dressing/dressing sponge;Moist to moist;Roll gauze;Tape/Soft cloth adhesive tape 06/03/21 0910   Wound Length (cm) 0.2 cm 06/17/21 0852   Wound Width (cm) 0.3 cm 06/17/21 0852   Wound Depth (cm) 0.1 cm 06/17/21 0852   Wound Surface Area (cm^2) 0.06 cm^2 06/17/21 0852   Change in Wound Size % (l*w) 97.78 06/17/21 0852   Wound Volume (cm^3) 0.01 cm^3 06/17/21 0852   Wound Healing % 96 06/17/21 0852   Post-Procedure Length (cm) 0.2 cm 06/17/21 0911   Post-Procedure Width (cm) 0.3 cm 06/17/21 0911   Post-Procedure Depth (cm) 0.1 cm 06/17/21 0911   Post-Procedure Surface Area (cm^2) 0.06 cm^2 06/17/21 0911   Post-Procedure Volume (cm^3) 0.01 cm^3 06/17/21 0911   Distance Tunneling (cm) 0 cm 06/17/21 0852   Tunneling Position ___ O'Clock 0 06/17/21 0852   Undermining Starts ___ O'Clock 0 06/17/21 0852   Undermining Ends___ O'Clock 0 06/17/21 0852   Undermining Maxium Distance (cm) 0 06/17/21 0852   Wound Assessment Pink/red;Slough 06/17/21 0852   Drainage Amount Scant 06/17/21 0852   Drainage Description Serosanguinous 06/17/21 0852   Odor None 06/17/21 0852   Jeanne-wound Assessment Intact 06/17/21 0852   Margins Attached edges 06/17/21 0852   Wound Thickness Description not for Pressure Injury Full thickness 06/17/21 0852   Number of days: 48            Diabetic/Pressure/Non Pressure Ulcers only:  Ulcer: N/A      Estimated Blood Loss:  Minimal    Hemostasis Achieved:  by pressure    Procedural Pain:  0  / 10     Post Procedural Pain:  0 / 10     Response to treatment:  Well tolerated by patient.        Plan:     Problem List Items Addressed This Visit     Type II diabetes mellitus with nephropathy ([address] - Primary    Abscess of right arm    * (Principal) Skin ulcer of upper arm, with fat layer exposed ([address]          Treatment Note please see attached Discharge Instructions    In my professional opinion this patient would benefit from HBO Therapy: No    Written patient dismissal instructions given to patient and signed by patient or POA.            Electronically signed by CHANELLE Johnson CNP on 6/17/2021 at 9:22 AM

## 2021-06-18 ENCOUNTER — ANTI-COAG VISIT (OUTPATIENT)
Dept: INTERNAL MEDICINE | Age: 71
End: 2021-06-18
Payer: MEDICARE

## 2021-06-18 DIAGNOSIS — Z79.01 ANTICOAGULATED ON COUMADIN: Primary | ICD-10-CM

## 2021-06-18 PROCEDURE — 93793 ANTICOAG MGMT PT WARFARIN: CPT | Performed by: INTERNAL MEDICINE

## 2021-06-18 NOTE — PROGRESS NOTES
HOME MONITORING REPORT    INR today:   Results for orders placed or performed in visit on 06/18/21   Protime-INR   Result Value Ref Range    INR 3.50        INR Goal: 2.0-3.0    Dosing Plan  As of 6/18/2021    TTR:  22.0 % (3.1 y)   Full warfarin instructions:  6/18: Hold; Otherwise 7.5 mg every Mon, Wed, Fri; 15 mg all other days              PLAN: Advised patient/caregiver to hold tonight, then resume current dose tomorrow, and recheck in one week. Patient/Caregiver voiced understanding      Electronically signed by Miracle Recinos MD on 6/18/2021 at 2:19 PM    I have reviewed nursing plan for Coumadin management and agree with plan.

## 2021-06-22 ENCOUNTER — CARE COORDINATION (OUTPATIENT)
Dept: CARE COORDINATION | Age: 71
End: 2021-06-22

## 2021-06-22 NOTE — CARE COORDINATION
Ambulatory Care Coordination Note  6/22/2021  CM Risk Score: 6  Charlson 10 Year Mortality Risk Score: 100%     ACC: Yumiko Hwang, RN    Summary Note: ACM spoke to Vipul Watt today -   She stated her arm wound is doing OK but reported a knot and bruise to upper right arm about 6 inches below her shoulder. She does not recall any injury or blow to her arm. She did have an elevated INR last week and followed PCP instructions to adjust her Coumadin dose. She will recheck her INR again on this Wed or Thurs. Pt noticed also some bright red blood on her Poise pad and a little on her tissue after she urinated and had BM once in the past week - no further episodes. Reported pain in her low side near right pelvis. Her stool is soft, she is not constipated. Patient stated she knows she doesn't drink enough fluids. She usually does not drink anything before going to work in the afternoon, will drink 2 20oz glasses of tea while at work. Vipul Watt hasn't checked her BS in past week. Plan:  ACM suggested that Vipul Watt drink clear fluids at home in the morning - try to get about 24 oz before work. This may ease s/s of dehydration and perhaps improve symptoms - water would be best. Inst to monitor for s/s of bladder infection such as urgency, frequency, lower abdominal pain, strong urine odor or changes in urine appearance. Pt needs to check a few FBS each week. Pt did not review meds today - she stated it was time to ready herself for work and was unable to talk longer today. Care Coordination Interventions    Program Enrollment: Complex Care  Referral from Primary Care Provider: No  Suggested Interventions and Community Resources  Other Therapy Services: Completed (Comment: Wound Clinic)  Zone Management Tools: In Process (Comment: 6/21: Pt has not tested her blood sugar at home. She reported one episode of BR blood on her poise pad and sml amount on tissue after BR break, unsure of source and no further blood noted.  Enc improve hydration, monitor for worsening sx.)         Goals Addressed                 This Visit's Progress     Self Monitoring   No change     Self-Monitored Blood Glucose - I will check my blood sugar Fasting blood sugar  I will notify my provider of any trends of increasing or decreasing blood sugars over a 1 month period. I will notify my provider if I have any blood sugar readings less than 70 more than 2 times a month. Blood Pressure - I will take my blood pressure as directed - Every other day  I will notify my provider of any trends of increasing or decreasing blood pressures over a month period of time. I will notify my provider of any changes in blood pressure associated with symptoms of dizziness, falls, passing out, headache, confusion/change in mental status. None Recently Recorded    Barriers: lack of motivation  Plan for overcoming my barriers:   Pt willing to review FBS and BP with ACM. Pt willing to keep log for review by her PCP as indicated. Family willing to assist patient as needed to check her vitals  Confidence: 8/10  Anticipated Goal Completion Date: 6/30/21              Prior to Admission medications    Medication Sig Start Date End Date Taking?  Authorizing Provider   pantoprazole (PROTONIX) 40 MG tablet TAKE 1 TABLET BY MOUTH TWICE DAILY BEFORE MEAL(S) 6/1/21   Nora Thompson MD   warfarin (COUMADIN) 7.5 MG tablet 7.5mg on Mondays and 15mg all other days  Patient taking differently: Take 15 mg by mouth daily  6/1/21   Nora Thompson MD   sodium hypochlorite (DAKINS) 0.125 % SOLN external solution Moisten gauze apply to wound twice daily 4/29/21   Daryl Gomes, APRN - CNP   CPAP Machine MISC Inhale 1 each into the lungs nightly    Historical Provider, MD   Cyanocobalamin 1000 MCG/ML KIT Inject as directed every 30 days    Historical Provider, MD   gabapentin (NEURONTIN) 100 MG capsule TAKE 1 CAPSULE BY MOUTH THREE TIMES DAILY 3/18/21 6/17/21  Nora Thompson MD   ferrous gluconate (FERGON) 240 (27 Fe) MG tablet Take 1 tablet by mouth 2 times daily 3/18/21   Miranda Carter MD   calcitRIOL (ROCALTROL) 0.25 MCG capsule Take 0.25 mcg by mouth daily    Sabrina Berger MD   bumetanide (BUMEX) 2 MG tablet Take 1 tablet by mouth daily 3/18/21   Miranda Carter MD   levothyroxine (SYNTHROID) 100 MCG tablet Take 1 tablet by mouth Daily 3/18/21   Miranda Carter MD   lisinopril (PRINIVIL;ZESTRIL) 40 MG tablet Take 1 tablet by mouth once daily 3/18/21   Miranda Carter MD   baclofen (LIORESAL) 10 MG tablet Take 1 tablet by mouth 3 times daily As needed for hip pain 3/18/21   Miranda Carter MD   escitalopram (LEXAPRO) 20 MG tablet Take 1 tablet by mouth daily 12/14/20   Miranda Carter MD   fexofenadine (ALLEGRA) 180 MG tablet Take 1 tablet by mouth daily Indications: Allergic Rhinitis 12/14/20   Miranda Carter MD   tiotropium (SPIRIVA RESPIMAT) 2.5 MCG/ACT AERS inhaler Inhale 2 puffs into the lungs daily 11/23/20   Miranda Carter MD   potassium chloride (KLOR-CON) 10 MEQ extended release tablet Take 10 mEq by mouth daily  10/27/20   Historical Provider, MD   calcipotriene (DOVONEX) 0.005 % cream Use topically as needed 7/7/20   Miranda Carter MD   clobetasol (TEMOVATE) 0.05 % cream Apply topically 2 times daily.  7/7/20   Miranda Carter MD   ondansetron (ZOFRAN) 4 MG tablet Take 1 tablet by mouth daily as needed for Nausea or Vomiting 1/31/20   iMranda Carter MD   Multiple Vitamins-Minerals (CENTRUM ADULTS) TABS Take 1 tablet by mouth daily    Historical Provider, MD   aspirin 81 MG tablet Take 81 mg by mouth daily    Historical Provider, MD       Future Appointments   Date Time Provider Jose Shea   6/24/2021  9:40 AM SCHEDULE, L MED ONC MA L MED ONC Shannan BOOTH   6/24/2021 10:00 AM [facility]ine Homans[facility]   7/1/2021  8:15 AM CHANELLE Lombardi CNP [facility] [address]   7/7/2021  9:45 AM [facility]      and Diabetes Assessment    Medic Alert ID: No  Meal Planning: None   How often do you test your blood sugar?: No Testing (Comment: A1c 5.6, takes no medications for dx)   Do you have barriers with adherence to non-pharmacologic self-management interventions?  (Nutrition/Exercise/Self-Monitoring): Yes   Have you ever had to go to the ED for symptoms of low blood sugar?: No       Do you have hyperglycemia symptoms?: No   Do you have hypoglycemia symptoms?: No   Blood Sugar Monitoring Regimen: Not Testing

## 2021-06-23 DIAGNOSIS — D50.9 IRON DEFICIENCY ANEMIA, UNSPECIFIED IRON DEFICIENCY ANEMIA TYPE: Primary | ICD-10-CM

## 2021-06-24 ENCOUNTER — ANTI-COAG VISIT (OUTPATIENT)
Dept: PRIMARY CARE CLINIC | Age: 71
End: 2021-06-24
Payer: MEDICARE

## 2021-06-24 ENCOUNTER — HOSPITAL ENCOUNTER (OUTPATIENT)
Dept: INFUSION THERAPY | Age: 71
Discharge: HOME | End: 2021-06-24
Payer: MEDICARE

## 2021-06-24 ENCOUNTER — OFFICE VISIT (OUTPATIENT)
Dept: HEMATOLOGY | Age: 71
End: 2021-06-24
Payer: MEDICARE

## 2021-06-24 VITALS
HEART RATE: 85 BPM | OXYGEN SATURATION: 99 % | DIASTOLIC BLOOD PRESSURE: 88 MMHG | SYSTOLIC BLOOD PRESSURE: 140 MMHG | HEIGHT: 67 IN | WEIGHT: 238.8 LBS | BODY MASS INDEX: 37.48 KG/M2

## 2021-06-24 DIAGNOSIS — D69.6 THROMBOCYTOPENIA (HCC): ICD-10-CM

## 2021-06-24 DIAGNOSIS — E03.9 HYPOTHYROIDISM, UNSPECIFIED TYPE: ICD-10-CM

## 2021-06-24 DIAGNOSIS — D50.9 IRON DEFICIENCY ANEMIA, UNSPECIFIED IRON DEFICIENCY ANEMIA TYPE: ICD-10-CM

## 2021-06-24 DIAGNOSIS — D50.9 IRON DEFICIENCY ANEMIA, UNSPECIFIED IRON DEFICIENCY ANEMIA TYPE: Primary | ICD-10-CM

## 2021-06-24 DIAGNOSIS — Z79.01 ANTICOAGULATED ON COUMADIN: Primary | ICD-10-CM

## 2021-06-24 DIAGNOSIS — Z79.01 ANTICOAGULATED ON COUMADIN: ICD-10-CM

## 2021-06-24 LAB
BASOPHILS ABSOLUTE: 0.01 K/UL (ref 0.01–0.08)
BASOPHILS RELATIVE PERCENT: 0.2 % (ref 0.1–1.2)
EOSINOPHILS ABSOLUTE: 0.1 K/UL (ref 0.04–0.54)
EOSINOPHILS RELATIVE PERCENT: 2.3 % (ref 0.7–7)
HCT VFR BLD CALC: 35.4 % (ref 34.1–44.9)
HEMOGLOBIN: 10.7 G/DL (ref 11.2–15.7)
INR BLD: 3.7
LYMPHOCYTES ABSOLUTE: 1.5 K/UL (ref 1.18–3.74)
LYMPHOCYTES RELATIVE PERCENT: 34.8 % (ref 19.3–53.1)
MCH RBC QN AUTO: 29 PG (ref 25.6–32.2)
MCHC RBC AUTO-ENTMCNC: 30.2 G/DL (ref 32.3–35.5)
MCV RBC AUTO: 95.9 FL (ref 79.4–94.8)
MONOCYTES ABSOLUTE: 0.64 K/UL (ref 0.24–0.82)
MONOCYTES RELATIVE PERCENT: 14.8 % (ref 4.7–12.5)
NEUTROPHILS ABSOLUTE: 2.06 K/UL (ref 1.56–6.13)
NEUTROPHILS RELATIVE PERCENT: 47.9 % (ref 34–71.1)
PDW BLD-RTO: 16.6 % (ref 11.7–14.4)
PLATELET # BLD: 162 K/UL (ref 182–369)
PMV BLD AUTO: 11 FL (ref 7.4–10.4)
RBC # BLD: 3.69 M/UL (ref 3.93–5.22)
WBC # BLD: 4.31 K/UL (ref 3.98–10.04)

## 2021-06-24 PROCEDURE — 99212 OFFICE O/P EST SF 10 MIN: CPT

## 2021-06-24 PROCEDURE — 1036F TOBACCO NON-USER: CPT | Performed by: NURSE PRACTITIONER

## 2021-06-24 PROCEDURE — G8399 PT W/DXA RESULTS DOCUMENT: HCPCS | Performed by: NURSE PRACTITIONER

## 2021-06-24 PROCEDURE — G8417 CALC BMI ABV UP PARAM F/U: HCPCS | Performed by: NURSE PRACTITIONER

## 2021-06-24 PROCEDURE — 93793 ANTICOAG MGMT PT WARFARIN: CPT | Performed by: INTERNAL MEDICINE

## 2021-06-24 PROCEDURE — G8427 DOCREV CUR MEDS BY ELIG CLIN: HCPCS | Performed by: NURSE PRACTITIONER

## 2021-06-24 PROCEDURE — 3017F COLORECTAL CA SCREEN DOC REV: CPT | Performed by: NURSE PRACTITIONER

## 2021-06-24 PROCEDURE — 4040F PNEUMOC VAC/ADMIN/RCVD: CPT | Performed by: NURSE PRACTITIONER

## 2021-06-24 PROCEDURE — 85025 COMPLETE CBC W/AUTO DIFF WBC: CPT

## 2021-06-24 PROCEDURE — 1090F PRES/ABSN URINE INCON ASSESS: CPT | Performed by: NURSE PRACTITIONER

## 2021-06-24 PROCEDURE — 1123F ACP DISCUSS/DSCN MKR DOCD: CPT | Performed by: NURSE PRACTITIONER

## 2021-06-24 PROCEDURE — 99213 OFFICE O/P EST LOW 20 MIN: CPT | Performed by: NURSE PRACTITIONER

## 2021-06-24 PROCEDURE — 36415 COLL VENOUS BLD VENIPUNCTURE: CPT

## 2021-06-24 ASSESSMENT — ENCOUNTER SYMPTOMS
WHEEZING: 0
ABDOMINAL PAIN: 0
NAUSEA: 0
CONSTIPATION: 0
BLOOD IN STOOL: 0
EYE PAIN: 0
DIARRHEA: 0
SORE THROAT: 0
GASTROINTESTINAL NEGATIVE: 1
EYE DISCHARGE: 0
COUGH: 0
BACK PAIN: 0
VOMITING: 0

## 2021-06-24 NOTE — PROGRESS NOTES
HOME MONITORING REPORT    INR today:   Results for orders placed or performed in visit on 06/24/21   Protime-INR   Result Value Ref Range    INR 3.70        INR Goal: 2.0-3.0    Dosing Plan  As of 6/24/2021    TTR:  21.9 % (3.1 y)   Full warfarin instructions:  6/24: Hold; Otherwise 7.5 mg every Mon, Wed, Fri; 15 mg all other days              PLAN: Advised patient/caregiver to hold then continue current dose and recheck in one week. Patient/Caregiver voiced understanding      Electronically signed by Pankaj Barrera MD on 6/24/2021 at 4:07 PM    I have reviewed nursing plan for Coumadin management and agree with plan.

## 2021-06-24 NOTE — PROGRESS NOTES
Patient swabbed for COVID-19 using [employer] vendor but was not evaluated inside the clinic. Patient specimen collected in drive through in patients private vehicle due to order present from primary care provider. Patient was not evaluated by flu clinic provider.
The patient is a 44y Male complaining of back pain general.

## 2021-06-24 NOTE — PROGRESS NOTES
Progress Note      Pt Name: Johnson Gu: 1950  MRN: [id number]    Date of evaluation: 6/24/2021  History Obtained From:  patient, electronic medical record    CHIEF COMPLAINT:    Chief Complaint   Patient presents with    Follow-up     Iron deficiency anemia, unspecified iron deficiency anemia type    Discuss Labs     HISTORY OF PRESENT ILLNESS:    Meredith Tai is a 66 y.o.  female with significant PMH recurrent DVT, thrombocytopenia and iron deficiency anemia. Valorie Juan continues on long-term anticoagulation with Coumadin and receives B12 injections under the management of Dr. Shanice Rojas. Recommendation has been to take ferrous gluconate 240 mg p.o. twice daily. Valorie Juan returns today in scheduled follow-up for evaluation, lab monitoring, side effect monitoring and further treatment recommendations. She presents today indicating that she has been compliant with the iron supplement twice daily and feels she is tolerating it without difficulty. She continues to receive monthly B12 injections. CBC today reveals a slight decline in hemoglobin of 10.7 compared to 11.2. Denies any bleeding tendencies. 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 Shanice Rojas? Venofer for a total of 1000 mg completed on 7/29/2018   Ferrous sulfate 325 mg PO twice a day , 8/15/2018-May 2020  Ferrous gluconate 240 mg p.o. 3 times daily initiated in May 2020, currently taking twice daily    Hematology history #1- Recurrent DVT and history of pulmonary emboli  Valorie Juan was seen in initial consultation on 7/26/2018 during her hospitalization at [facility] for recurrent DVT and anemia. She has a history of pulmonary emboli and DVTand has been followed in the past by Dr. Shayna Kothari.  Serology studies in September 2007 documented ANÍBAL negative and factor II analysis prothrombin gene mutation was negative. She has an IVC filter in place. Nichelle Burris presented to the ER at [facility] on 7/24/2018 with complaints of significant left lower extremity edema and pain that had been persistent for approximately one week prior to presentation. Nichelle Burris has been on chronic anticoagulation with warfarin since 2003 and was subtherapeutic at presentation with an INR of 1.27 on 7/24/2018. Review of INRs available through Epic dating back to 1/31/2018 to 7/24/2018 indicated Nichelle Burris has remained routinely subtherapeutic. She reported financial restraint and was unable to obtain warfarin. During her hospitalization, she was placed on a heparin drip and bridged with warfarin. LOWER EXTREMITY BILATERAL VENOUS DUPLEX On 7/25/2018 - chronic deep vein thrombosis in the right lower extremity involving the femoral and popliteal, veins. Lupus anticoagulant not detected on 7/26/2018. Recurrent DVT to Left lower extremity occurred most likely due to subtherapeutic INR related to noncompliance, do not suspect warfarin failure. Nichelle Burris indicates that she monitors her PT/INR at home and calls Dr. Marlo Rodriguez office with the results for dosing changes related to her warfarin. Hematology history #2- Iron deficiency anemia  Nichelle Burris has a history of iron deficiency anemia dating back to  1993. Her deficiency may also be exacerbated by absorption, she had aRouxen-Y gastric bypass surgery 9/12/2003. She has received IV iron replacement with Venofer on several occasions under the direction of Dr. Dee Sams. Nichelle Burris had a bone marrow aspiration and biopsy was completed on 8/31/2007 documented left shifted myeloid maturation and dysmaturation. 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 Normocellular bone marrow for age. Decreased storage iron and no ringed sideroblasts. Moderate increase of reticulin fibers.     Nichelle Burris had an upper endoscopy on 8/10/2015 by Dr. Archie Klein that identified a normal postoperative Gage en Y gastric bypass anatomy. A marginal ulcer with no bleeding tendencies noted. Serology studies on 7/26/2018:  Iron 41   Iron saturation 13%   Ferritin 97.4   TIBC 316   Reticulocyte count 0.0521      Haptoglobin <10   Hemoglobin 8.7 and hematocrit 27.8    Leny received Venofer for a total of 1000 mg, completed on 7/29/2018. Serology studies on 5/6/2019, Obtained by Dr. Ana Banda:  Iron 54   B12 876   Folate 7.61   Copper 135     Serology studies on 11/22/2019:  · Iron 74  · Iron saturation 20.1%  · TIBC is 368  · Ferritin 88  · Folate 16  · Hemoglobin 11.4/MCV 92.0    Serology studies on 10/28/2020  · Ferritin 259  · Iron 45  · TIBC 248  · Vitamin B12 796    Serology studies on 2/25/2021  · Iron 53  · TIBC 314  · Iron saturation 17  · Ferritin 81.7  · Vitamin B-12:506   · Folate 9.7    Serology studies on 3/11/2021  · Vitamin B-12:  806  · Vitamin D: 25.4    4/13/2021 CT Abdomen/Pelvis without documented reported as stable with no change from previous scan. No acute abnormality of the abdomen or pelvis. Evidence of previous gastric bypass surgery. Moderate thickening of the stomach in the area of the surgery is probably due to incomplete distention. However possibility of an inflammatory or neoplastic process may not be excluded. Serology studies on 4/29/2021  · Sed rate 53  · CRP 5.92    5/7/2021  EGD/Colon at [facility]: Path revealed A. Stomach, random gastric biopsies: 1. Benign gastric mucosa with minimal chronic inflammation. 2. No Helicobacter pylori organisms identified by immunohistochemical stain. B. Colon, random colonic biopsies: Benign colonic mucosa with no significant histopathologic changes. 5/7/2021 Barium enema due to incomplete colonoscopy: tortuous colon: No obvious mucosal lesion, mass effect or colonic stricture is identified, there is somewhat limited visualization of the splenic flexure due to overlapping bowel loops. Scattered sigmoid diverticula are present.      6/1/2021 Bilateral mammograms was reported as no mammographic evidence of malignancy        Past Medical History:    Past Medical History:   Diagnosis Date    Abdominal pain     Abnormal EKG     Acute sinusitis     Allergic reaction to spider bite     Anemia     Anticoagulated     Anxiety     Arrhythmia     Asthmatic bronchitis without complication 6/11/2616    Ataxic gait     Lyons's esophagus     Bowel obstruction (HCC)     Burn of abdomen wall, second degree, initial encounter 8/27/2018    Callus     Cardiac pacemaker     CKD (chronic kidney disease) stage 2, GFR 60-89 ml/min     Coat's syndrome     Coat's syndrome     both eyes    Depression     Diabetes mellitus type 2 in nonobese (HCC)     Diabetic nephropathy (HCC)     Disequilibrium     Dizziness     DVT (deep venous thrombosis) (HCC)     Exudative retinopathy     Fibromyalgia     Fibromyositis     Gastric ulcer     GERD (gastroesophageal reflux disease)     History of gastric bypass     Hx of lupus anticoagulant disorder     Hypertension     Hypothyroidism     Intermittent claudication (HCC)     Intestinal obstruction (HCC)     Iron deficiency     Left-sided weakness     Low vitamin D level     Lupus (HCC)     Menopause     Obesity     Osteoarthritis     Osteoporosis     Other iron deficiency anemias     Palliative care patient 09/24/2020    Pernicious anemia     PUPP (pruritic urticarial papules and plaques of pregnancy)     Right leg numbness     Right sided sciatica     Sarcoidosis     with liver involvement    Sciatica     Secondary hyperparathyroidism ([address]     Shingles     Shortness of breath     Sleep apnea     Stomach ulcer     Syncope     Visual loss, one eye        Past Surgical History:    Past Surgical History:   Procedure Laterality Date    APPENDECTOMY      CARDIAC CATHETERIZATION  10/21/13  [address]    EF over 60%    CHOLECYSTECTOMY      COLONOSCOPY  02/2010    negative    COLONOSCOPY 02/22/2010    Dr Court Batse    COLONOSCOPY  04/01/2016    Dr LAKHWINDER Horvath-internal hemorrhoids, 5 yr recall    COLONOSCOPY N/A 05/07/2021    Dr Bangura Mail looping/tortuosity throughout the left colon, BE=Normal    DILATATION, ESOPHAGUS      EYE SURGERY      Cyst on Right eye    EYE SURGERY      GASTRIC BYPASS SURGERY      GASTRIC BYPASS SURGERY      HERNIA REPAIR      HYSTERECTOMY      Complete    HYSTERECTOMY      Partial - because had a tubal pregnancy.      INCONTINENCE SURGERY      Bladder Sling    INFECTED SKIN DEBRIDEMENT Right     dog bite right forearm    OTHER SURGICAL HISTORY      IVC filter    PACEMAKER INSERTION      PACEMAKER PLACEMENT      medtronic    ID TOTAL KNEE ARTHROPLASTY Right 03/26/2018    TOTAL KNEE REPLACEMENT COMPLEX PRIMARY performed by Juancho Chambers MD at [facility] [facility] SMALL INTESTINE SURGERY      SPLENECTOMY      KRISTEL AND BSO      TONSILLECTOMY AND ADENOIDECTOMY      TOTAL KNEE ARTHROPLASTY      UPPER GASTROINTESTINAL ENDOSCOPY  12/2011    gerd s/p gastric bypass    UPPER GASTROINTESTINAL ENDOSCOPY  02/2014    normal s/p gastric bypass    UPPER GASTROINTESTINAL ENDOSCOPY  02/2010    biopsy neg Barretts, chronic reflux esophagitis s/p gastric bypass    UPPER GASTROINTESTINAL ENDOSCOPY  07/2006    unremarkable s/p gastric bypass    UPPER GASTROINTESTINAL ENDOSCOPY  08/10/2015    Dr Shine Almaguer UPPER GASTROINTESTINAL ENDOSCOPY N/A 04/01/2016    Dr. Kirkpatrick Blades:  H Pylori(-), HH, o/w normal    UPPER GASTROINTESTINAL ENDOSCOPY N/A 05/07/2021    Dr Carlos Wray hiatal hernia, previous gastric bypass surgery, gastritis    VENA CAVA FILTER PLACEMENT Right 2003       Current Medications:    Current Outpatient Medications   Medication Sig Dispense Refill    pantoprazole (PROTONIX) 40 MG tablet TAKE 1 TABLET BY MOUTH TWICE DAILY BEFORE MEAL(S) 180 tablet 0    warfarin (COUMADIN) 7.5 MG tablet 7.5mg on Mondays and 15mg all other days (Patient taking differently: Take 15 mg by mouth daily ) 180 tablet 3    CPAP Machine MISC Inhale 1 each into the lungs nightly      Cyanocobalamin 1000 MCG/ML KIT Inject as directed every 30 days      ferrous gluconate (FERGON) 240 (27 Fe) MG tablet Take 1 tablet by mouth 2 times daily 180 tablet 2    calcitRIOL (ROCALTROL) 0.25 MCG capsule Take 0.25 mcg by mouth daily      bumetanide (BUMEX) 2 MG tablet Take 1 tablet by mouth daily 30 tablet 5    levothyroxine (SYNTHROID) 100 MCG tablet Take 1 tablet by mouth Daily 90 tablet 3    lisinopril (PRINIVIL;ZESTRIL) 40 MG tablet Take 1 tablet by mouth once daily 90 tablet 1    baclofen (LIORESAL) 10 MG tablet Take 1 tablet by mouth 3 times daily As needed for hip pain 10 tablet 1    fexofenadine (ALLEGRA) 180 MG tablet Take 1 tablet by mouth daily Indications: Allergic Rhinitis 90 tablet 3    tiotropium (SPIRIVA RESPIMAT) 2.5 MCG/ACT AERS inhaler Inhale 2 puffs into the lungs daily 1 Inhaler 5    potassium chloride (KLOR-CON) 10 MEQ extended release tablet Take 10 mEq by mouth daily       calcipotriene (DOVONEX) 0.005 % cream Use topically as needed 3 Tube 3    clobetasol (TEMOVATE) 0.05 % cream Apply topically 2 times daily.  3 Tube 3    ondansetron (ZOFRAN) 4 MG tablet Take 1 tablet by mouth daily as needed for Nausea or Vomiting 30 tablet 0    Multiple Vitamins-Minerals (CENTRUM ADULTS) TABS Take 1 tablet by mouth daily      aspirin 81 MG tablet Take 81 mg by mouth daily      escitalopram (LEXAPRO) 20 MG tablet Take 1 tablet by mouth daily 90 tablet 3    sucralfate (CARAFATE) 1 GM tablet Take 1 tablet by mouth 4 times daily 120 tablet 3    gabapentin (NEURONTIN) 100 MG capsule TAKE 1 CAPSULE BY MOUTH THREE TIMES DAILY 270 capsule 0     Current Facility-Administered Medications   Medication Dose Route Frequency Provider Last Rate Last Admin    cyanocobalamin injection 1,000 mcg  1,000 mcg Intramuscular Once Honey Bloom MD            Allergies: Allergies   Allergen Reactions    Insect Extract Allergy Skin Test Anaphylaxis     Bee stings    Lactose Intolerance (Gi)     Prednisone      Headache and upset stomach    Zanaflex [Tizanidine Hcl]      Passed out lost control of body functions    Lortab [Hydrocodone-Acetaminophen] Nausea And Vomiting     Sweats, weak, nausea and vomiting    Other Nausea And Vomiting     Opiates------sweating, weak        Oxycodone-Acetaminophen Nausea And Vomiting     Sweating and vomiting     Ultram [Tramadol Hcl] Nausea And Vomiting     Sweating, weak, nausea and vomiting         Social History:    Social History     Tobacco Use    Smoking status: Never Smoker    Smokeless tobacco: Never Used   Vaping Use    Vaping Use: Never used   Substance Use Topics    Alcohol use: No    Drug use: No       Family History:   Family History   Problem Relation Age of Onset    Uterine Cancer Mother     Cervical Cancer Mother     Coronary Art Dis Mother     Heart Disease Father     Lung Cancer Father     Other Father         renal failure    Colon Cancer Brother     Colon Polyps Brother     Uterine Cancer Maternal Grandmother     Cervical Cancer Maternal Grandmother     Diabetes Maternal Grandmother     Cervical Cancer Sister     Other Sister         fibromyalgia    Diabetes Paternal Aunt     Breast Cancer Maternal Cousin     Esophageal Cancer Neg Hx     Liver Cancer Neg Hx     Liver Disease Neg Hx     Stomach Cancer Neg Hx     Rectal Cancer Neg Hx        Vitals:  Vitals:    06/24/21 1025   BP: (!) 140/88   Pulse: 85   SpO2: 99%   Weight: 238 lb 12.8 oz (108.3 kg)   Height: 5' 7\" (1.702 m)        Subjective   REVIEW OF SYSTEMS:   Review of Systems   Constitutional: Positive for fatigue. Negative for chills, diaphoresis and fever. HENT: Negative. Negative for congestion, ear pain, hearing loss, nosebleeds, sore throat and tinnitus. Eyes: Negative. Negative for pain, discharge and redness.    Respiratory: Negative. Negative for cough, shortness of breath and wheezing. Cardiovascular: Negative. Negative for chest pain, palpitations and leg swelling (Chronic bilateral lower extremities). Gastrointestinal: Negative. Negative for abdominal pain, blood in stool, constipation, diarrhea, nausea and vomiting. Endocrine: Negative for polydipsia. Genitourinary: Negative for dysuria, flank pain, frequency, hematuria and urgency. Musculoskeletal: Negative. Negative for back pain, myalgias and neck pain. Skin: Negative. Negative for rash. Neurological: Negative. Negative for dizziness, tremors, seizures, weakness and headaches. Hematological: Does not bruise/bleed easily. Psychiatric/Behavioral: Negative. The patient is not nervous/anxious. Objective   PHYSICAL EXAM:  Physical Exam  Vitals reviewed. Constitutional:       General: She is not in acute distress. Appearance: She is well-developed. She is not diaphoretic. HENT:      Head: Normocephalic and atraumatic. Mouth/Throat:      Pharynx: Uvula midline. Tonsils: No tonsillar exudate. Eyes:      General: Lids are normal. No scleral icterus. Right eye: No discharge. Left eye: No discharge. Conjunctiva/sclera: Conjunctivae normal.      Pupils: Pupils are equal, round, and reactive to light. Neck:      Thyroid: No thyroid mass or thyromegaly. Vascular: No JVD. Trachea: Trachea normal. No tracheal deviation. Cardiovascular:      Rate and Rhythm: Normal rate and regular rhythm. Heart sounds: Normal heart sounds. No murmur heard. No friction rub. No gallop. Pulmonary:      Effort: Pulmonary effort is normal. No respiratory distress. Breath sounds: Normal breath sounds. No wheezing or rales. Chest:      Chest wall: No tenderness. Abdominal:      General: Bowel sounds are normal. There is no distension. Palpations: Abdomen is soft. There is no mass. Tenderness:  There is no abdominal tenderness. There is no guarding or rebound. Hernia: No hernia is present. Musculoskeletal:         General: No tenderness or deformity. Cervical back: Normal range of motion and neck supple. Right lower leg: Edema (Chronic with no significant change) present. Left lower leg: Edema present. Comments: Range of motion within normal limits x4 extremities   Skin:     General: Skin is warm. Coloration: Skin is not pale. Findings: No erythema or rash. Neurological:      Mental Status: She is alert and oriented to person, place, and time. Cranial Nerves: No cranial nerve deficit. Coordination: Coordination normal.   Psychiatric:         Behavior: Behavior normal.         Thought Content: Thought content normal.         Labs:  WBC 4.13, ANC 2.06, hemoglobin 10.7, MCV 95.9 and a platelet count of 027,852    ASSESSMENT/PLAN:      1. Iron deficiency anemia with concern for malabsorption, hemoglobin has declined to 10.7 with MCV of 95.9, she denies any bleeding tendencies. Continues to take iron supplement twice daily. I reviewed the following findings that occurred since previous follow-up and the iron substrates that were obtained at last office visit:  Serology studies on 2/25/2021  · Iron 53  · TIBC 314  · Iron saturation 17  · Ferritin 81.7  · Vitamin B-12:506   · Folate 9.7    Serology studies on 3/11/2021  · Vitamin B-12:  806  · Vitamin D: 25.4    4/13/2021 CT Abdomen/Pelvis without documented reported as stable with no change from previous scan. No acute abnormality of the abdomen or pelvis. Evidence of previous gastric bypass surgery. Moderate thickening of the stomach in the area of the surgery is probably due to incomplete distention. However possibility of an inflammatory or neoplastic process may not be excluded. Serology studies on 4/29/2021  · Sed rate 53  · CRP 5.92    5/7/2021  EGD/Colon at [facility]: Path revealed A.  Stomach, random gastric biopsies: 1. Benign gastric mucosa with minimal chronic inflammation. 2. No Helicobacter pylori organisms identified by immunohistochemical stain. B. Colon, random colonic biopsies: Benign colonic mucosa with no significant histopathologic changes. 5/7/2021 Barium enema due to incomplete colonoscopy: tortuous colon: No obvious mucosal lesion, mass effect or colonic stricture is identified, there is somewhat limited visualization of the splenic flexure due to overlapping bowel loops. Scattered sigmoid diverticula are present.   -Continue ferrous gluconate twice daily    Labs to be obtained today:  - Iron and TIBC; Future  - Ferritin; Future  - Vitamin B12; Future  - Comprehensive Metabolic Panel; Future    -Continue iron supplement twice daily    2. Anticoagulated on Coumadin, due to history of recurrent DVTs and pulmonary emboli. Coumadin is currently managed by Dr. Obed Acosta. -Continue chronic anticoagulation management under the direction of Dr. Obed Acosta with a goal INR between 2-3     3. Chronic thrombocytopenia that waxes and wanes. Platelet count 013,103, has normalized today.    -Continue with conservative monitoring. 4.  History of hypothyroidism, on Synthroid 1000 mcg daily. Managed by Dr. Obed Acosta. ,  TSH of 1.910 last on 3/11/2021    I discussed all of the above findings included in the assessment and plan with the patient and the patient is in agreement to move forward with current recommendations/treatment. I have addressed all of their questions and concerns that were verbalized. FOLLOW UP:  1. Follow-up appointment given for 4 months, sooner if needed  2. Continue to follow closely with Dr. Obed Acosta and other medical providers as recommended    Discussed precautions related to [address] and being at increased risk. Discussed proper handwashing to be done frequently, limit exposure to other individuals and maintain social distancing of 6 feet.   Recommend contacting primary care provider if having respiratory symptoms for further recommendations and consideration for testing. EMR Dragon/Transcription disclaimer:   Much of this encounter note is an electronic transcription/translation of spoken language to printed text. The electronic translation of spoken language may permit erroneous, or at times, nonsensical words or phrases to be inadvertently transcribed; although attempts have made to review the note for such errors, some may still exist. Also, portions of this note have been copied forward, however, changed to reflect the most current clinical status of this patient. Suma AMIN APRN personally performed the services described in this documentation as scribed by Everlena Mcardle, RN in my presence and is both accurate and complete. Neto Epstein am scribing for CHANELLE Auguste. Electronically signed by Everlena Mcardle, RN on 6/24/2021 at 1636. Suma AMIN APRN personally performed the services described in this documentation as scribed by Everlena Mcardle, RN in my presence and is both accurate and complete.     Electronically signed by CHANELLE Auguste on 7/16/2021 at 4:34 PM

## 2021-06-30 LAB — INR BLD: 8

## 2021-07-01 ENCOUNTER — HOSPITAL ENCOUNTER (OUTPATIENT)
Dept: WOUND CARE | Age: 71
Discharge: HOME | End: 2021-07-01
Payer: MEDICARE

## 2021-07-01 ENCOUNTER — HOSPITAL ENCOUNTER (OUTPATIENT)
Dept: LAB | Age: 71
Discharge: HOME | End: 2021-07-01
Payer: MEDICARE

## 2021-07-01 ENCOUNTER — ANTI-COAG VISIT (OUTPATIENT)
Dept: INTERNAL MEDICINE | Age: 71
End: 2021-07-01
Payer: MEDICARE

## 2021-07-01 VITALS
DIASTOLIC BLOOD PRESSURE: 110 MMHG | HEART RATE: 80 BPM | SYSTOLIC BLOOD PRESSURE: 172 MMHG | HEIGHT: 67 IN | TEMPERATURE: 97.8 F | BODY MASS INDEX: 37.35 KG/M2 | RESPIRATION RATE: 18 BRPM | WEIGHT: 238 LBS

## 2021-07-01 DIAGNOSIS — L02.413 ABSCESS OF RIGHT ARM: ICD-10-CM

## 2021-07-01 DIAGNOSIS — E55.9 VITAMIN D DEFICIENCY: ICD-10-CM

## 2021-07-01 DIAGNOSIS — E11.49 OTHER DIABETIC NEUROLOGICAL COMPLICATION ASSOCIATED WITH TYPE 2 DIABETES MELLITUS (HCC): ICD-10-CM

## 2021-07-01 DIAGNOSIS — L98.492 SKIN ULCER OF UPPER ARM, WITH FAT LAYER EXPOSED (HCC): ICD-10-CM
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
## 2021-07-01 DIAGNOSIS — Z79.01 ANTICOAGULATED ON COUMADIN: Primary | ICD-10-CM

## 2021-07-01 DIAGNOSIS — D50.9 IRON DEFICIENCY ANEMIA, UNSPECIFIED IRON DEFICIENCY ANEMIA TYPE: ICD-10-CM

## 2021-07-01 LAB
ALBUMIN SERPL-MCNC: 4 G/DL (ref 3.5–5.2)
ALP BLD-CCNC: 79 U/L (ref 35–104)
ALT SERPL-CCNC: 9 U/L (ref 5–33)
ANION GAP SERPL CALCULATED.3IONS-SCNC: 10 MMOL/L (ref 7–19)
AST SERPL-CCNC: 20 U/L (ref 5–32)
BASOPHILS ABSOLUTE: 0 K/UL (ref 0–0.2)
BASOPHILS RELATIVE PERCENT: 0.8 % (ref 0–1)
BILIRUB SERPL-MCNC: <0.2 MG/DL (ref 0.2–1.2)
BUN BLDV-MCNC: 23 MG/DL (ref 8–23)
CALCIUM SERPL-MCNC: 9 MG/DL (ref 8.8–10.2)
CHLORIDE BLD-SCNC: 107 MMOL/L (ref 98–111)
CHOLESTEROL, TOTAL: 232 MG/DL (ref 160–199)
CO2: 25 MMOL/L (ref 22–29)
CREAT SERPL-MCNC: 1.1 MG/DL (ref 0.5–0.9)
CREATININE URINE: 202.2 MG/DL (ref 4.2–622)
EOSINOPHILS ABSOLUTE: 0.1 K/UL (ref 0–0.6)
EOSINOPHILS RELATIVE PERCENT: 2.1 % (ref 0–5)
FERRITIN: 105.3 NG/ML (ref 13–150)
FOLATE: 10.2 NG/ML (ref 4.8–37.3)
GFR AFRICAN AMERICAN: >59
GFR NON-AFRICAN AMERICAN: 49
GLUCOSE BLD-MCNC: 79 MG/DL (ref 74–109)
HBA1C MFR BLD: 5.4 % (ref 4–6)
HCT VFR BLD CALC: 35.2 % (ref 37–47)
HDLC SERPL-MCNC: 80 MG/DL (ref 65–121)
HEMOGLOBIN: 11 G/DL (ref 12–16)
IMMATURE GRANULOCYTES #: 0 K/UL
IRON SATURATION: 13 % (ref 14–50)
IRON: 40 UG/DL (ref 37–145)
LDL CHOLESTEROL CALCULATED: 137 MG/DL
LYMPHOCYTES ABSOLUTE: 1.4 K/UL (ref 1.1–4.5)
LYMPHOCYTES RELATIVE PERCENT: 37.7 % (ref 20–40)
MCH RBC QN AUTO: 29.4 PG (ref 27–31)
MCHC RBC AUTO-ENTMCNC: 31.3 G/DL (ref 33–37)
MCV RBC AUTO: 94.1 FL (ref 81–99)
MICROALBUMIN UR-MCNC: 1.4 MG/DL (ref 0–19)
MICROALBUMIN/CREAT UR-RTO: 6.9 MG/G
MONOCYTES ABSOLUTE: 0.5 K/UL (ref 0–0.9)
MONOCYTES RELATIVE PERCENT: 12.4 % (ref 0–10)
NEUTROPHILS ABSOLUTE: 1.8 K/UL (ref 1.5–7.5)
NEUTROPHILS RELATIVE PERCENT: 46.7 % (ref 50–65)
PDW BLD-RTO: 16.6 % (ref 11.5–14.5)
PLATELET # BLD: 178 K/UL (ref 130–400)
PMV BLD AUTO: 11.9 FL (ref 9.4–12.3)
POTASSIUM SERPL-SCNC: 4.4 MMOL/L (ref 3.5–5)
RBC # BLD: 3.74 M/UL (ref 4.2–5.4)
SODIUM BLD-SCNC: 142 MMOL/L (ref 136–145)
TOTAL IRON BINDING CAPACITY: 297 UG/DL (ref 250–400)
TOTAL PROTEIN: 7.6 G/DL (ref 6.6–8.7)
TRIGL SERPL-MCNC: 75 MG/DL (ref 0–149)
TSH SERPL DL<=0.05 MIU/L-ACNC: 3.93 UIU/ML (ref 0.27–4.2)
VITAMIN B-12: 724 PG/ML (ref 211–946)
VITAMIN D 25-HYDROXY: 26.3 NG/ML
WBC # BLD: 3.8 K/UL (ref 4.8–10.8)

## 2021-07-01 PROCEDURE — 99212 OFFICE O/P EST SF 10 MIN: CPT | Performed by: NURSE PRACTITIONER

## 2021-07-01 PROCEDURE — 93793 ANTICOAG MGMT PT WARFARIN: CPT | Performed by: INTERNAL MEDICINE

## 2021-07-01 PROCEDURE — 99212 OFFICE O/P EST SF 10 MIN: CPT

## 2021-07-01 ASSESSMENT — PAIN SCALES - GENERAL: PAINLEVEL_OUTOF10: 0

## 2021-07-01 NOTE — PROGRESS NOTES
HOME MONITORING REPORT    INR today:   Results for orders placed or performed in visit on 07/01/21   Protime-INR   Result Value Ref Range    INR 8.00        INR Goal: 2.0-3.0    Dosing Plan  As of 7/1/2021    TTR:  21.8 % (3.1 y)   Full warfarin instructions:  7/1: Dyke Ha; 7/2: Dyke Ha; 7/3: Hold; 7/4: Hold; 7/5: Hold; Otherwise 7.5 mg every Mon, Wed, Fri; 15 mg all other days              PLAN: Advised patient/caregiver to hold until it is rechecked on Tuesday. Patient/Caregiver voiced understanding      Electronically signed by Pj Goodrich MD on 7/1/2021 at 4:53 PM    I have reviewed nursing plan for Coumadin management and agree with plan.

## 2021-07-01 NOTE — PLAN OF CARE
Problem: Wound:  Goal: Signs of wound healing will improve  Description: Signs of wound healing will improve  7/1/2021 0901 by Kassi Jones RN  Outcome: Completed  7/1/2021 0835 by Vivian Hinkle RN  Outcome: Met This Shift  7/1/2021 0835 by Vivian Hinkle RN  Outcome: Met This Shift  7/1/2021 0834 by Vivian Hinkle RN  Outcome: Not Met This Shift     Problem: Blood Glucose:  Goal: Ability to maintain appropriate glucose levels will improve  Description: Ability to maintain appropriate glucose levels will improve  7/1/2021 0901 by Kassi Jones RN  Outcome: Completed  7/1/2021 0835 by Vivian Hinkle RN  Outcome: Met This Shift  7/1/2021 0835 by Vivian Hinkle RN  Outcome: Met This Shift  7/1/2021 0834 by Vivian Hinkle RN  Outcome: Not Met This Shift     Problem: Falls - Risk of:  Goal: Will remain free from falls  Description: Will remain free from falls  7/1/2021 0901 by Kassi Jones RN  Outcome: Completed  7/1/2021 0835 by Vivian Hinkle RN  Outcome: Met This Shift  7/1/2021 0835 by Vivian Hinkle RN  Outcome: Met This Shift  7/1/2021 0834 by Vivian Hinkle RN  Outcome: Not Met This Shift  Goal: Absence of physical injury  Description: Absence of physical injury  7/1/2021 0901 by Kassi Jones RN  Outcome: Completed  7/1/2021 0835 by Vivian Hinkle RN  Outcome: Met This Shift  7/1/2021 0835 by Vivian Hinkle RN  Outcome: Met This Shift  7/1/2021 0834 by Vivian Hinkle RN  Outcome: Not Met This Shift     Problem: Pain:  Description: Pain management should include both nonpharmacologic and pharmacologic interventions.   Goal: Pain level will decrease  Description: Pain level will decrease  7/1/2021 0901 by Kassi Jones RN  Outcome: Completed  7/1/2021 0835 by Vivian Hinkle RN  Outcome: Met This Shift  7/1/2021 0835 by Vivian Hinkle RN  Outcome: Met This Shift  7/1/2021 0834 by Vivian Hinkle RN  Outcome: Not Met This Shift  Goal: Control of acute pain  Description: Control of acute pain  7/1/2021 0901 by Eugenia Epley, RN  Outcome: Completed  7/1/2021 0835 by Jennifer Vogel RN  Outcome: Met This Shift  7/1/2021 0835 by Jennifer Vogel RN  Outcome: Met This Shift  7/1/2021 0834 by Jennifer Vogel RN  Outcome: Not Met This Shift  Goal: Control of chronic pain  Description: Control of chronic pain  7/1/2021 0901 by Eugenia Epley, RN  Outcome: Completed  7/1/2021 0835 by Jennifer Vogel RN  Outcome: Met This Shift  7/1/2021 0835 by Jennifer Vogel RN  Outcome: Met This Shift  7/1/2021 0834 by Jennifer Vogel RN  Outcome: Not Met This Shift

## 2021-07-01 NOTE — PLAN OF CARE
Problem: Wound:  Goal: Signs of wound healing will improve  Description: Signs of wound healing will improve  7/1/2021 0835 by Jennifer Vogel RN  Outcome: Met This Shift  7/1/2021 0835 by Jennifer Vogel RN  Outcome: Met This Shift  7/1/2021 0834 by Jennifer Vogel RN  Outcome: Not Met This Shift     Problem: Blood Glucose:  Goal: Ability to maintain appropriate glucose levels will improve  Description: Ability to maintain appropriate glucose levels will improve  7/1/2021 0835 by Jennifer Vogel RN  Outcome: Met This Shift  7/1/2021 0835 by Jennifer Vogel RN  Outcome: Met This Shift  7/1/2021 0834 by Jennifer Vogel RN  Outcome: Not Met This Shift     Problem: Falls - Risk of:  Goal: Will remain free from falls  Description: Will remain free from falls  7/1/2021 0835 by Jennifer Vogel RN  Outcome: Met This Shift  7/1/2021 0835 by Jennifer Vogel RN  Outcome: Met This Shift  7/1/2021 0834 by Jennifer Vogel RN  Outcome: Not Met This Shift  Goal: Absence of physical injury  Description: Absence of physical injury  7/1/2021 0835 by Jennifer Vogel RN  Outcome: Met This Shift  7/1/2021 0835 by Jennifer Vogel RN  Outcome: Met This Shift  7/1/2021 0834 by Jennifer Vogel RN  Outcome: Not Met This Shift     Problem: Falls - Risk of:  Goal: Absence of physical injury  Description: Absence of physical injury  7/1/2021 0835 by Jennifer Vogel RN  Outcome: Met This Shift  7/1/2021 0835 by Jennifer Vogel RN  Outcome: Met This Shift  7/1/2021 0834 by Jennifer Vogel RN  Outcome: Not Met This Shift
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
## 2021-07-06 ENCOUNTER — ANTI-COAG VISIT (OUTPATIENT)
Dept: PRIMARY CARE CLINIC | Age: 71
End: 2021-07-06
Payer: MEDICARE

## 2021-07-06 ENCOUNTER — CARE COORDINATION (OUTPATIENT)
Dept: CARE COORDINATION | Age: 71
End: 2021-07-06

## 2021-07-06 ENCOUNTER — TELEPHONE (OUTPATIENT)
Dept: INTERNAL MEDICINE | Age: 71
End: 2021-07-06

## 2021-07-06 DIAGNOSIS — Z79.01 ANTICOAGULATED ON COUMADIN: Primary | ICD-10-CM

## 2021-07-06 LAB — INR BLD: 1.3

## 2021-07-06 PROCEDURE — 93793 ANTICOAG MGMT PT WARFARIN: CPT | Performed by: INTERNAL MEDICINE

## 2021-07-06 NOTE — PROGRESS NOTES
HOME MONITORING REPORT    INR today:   Results for orders placed or performed in visit on 07/06/21   Protime-INR   Result Value Ref Range    INR 1.30        INR Goal: 2.0-3.0    Dosing Plan  As of 7/6/2021    TTR:  21.7 % (3.1 y)   Full warfarin instructions:  7.5 mg every Mon, Wed, Fri; 15 mg all other days              PLAN: patient had held her dose for four days instead of five. She took 15 mg last night, is scheduled for a 15 mg dose tonight. She will recheck tomorrow. Please consider if this patient is a good candidate for long term warfarin therapy. She is frequently out of range, has a history of noncompliance that may be due to not understanding instructions. Electronically signed by Paco Lazar MD on 7/6/2021 at 3:09 PM    I have reviewed nursing plan for Coumadin management and agree with plan.

## 2021-07-06 NOTE — CARE COORDINATION
Ambulatory Care Coordination Note  7/6/2021  CM Risk Score: 14  Charlson 10 Year Mortality Risk Score: 100%     ACC: Dougie Rangel RN    Summary Note: ACM spoke to John Robles this morning. Her arm wound has healed and she has been discharged from the wound clinic. Leny's Coumadin is on hold and she is to recheck her INR today. She had an A1c of 5.4 with her [facility] labs! Pt has continued with her normal daily schedule during her wound treatment, has done well. Plan:  Encouraged patient to drink more fluids in the mornings before work. Praised her for good A1c! She has f/u with PCP on 7/7 - likely PCP will discuss lab results at that time. Amalia Welch checked her INR during our call and reported it was 1.7 - ACM provided this information to Tramaine Carrillo for Dr. Jose Ramon Hernandez. Encouraged patient to contact me if she has any new needs or concerns regarding her healthcare outcomes. Care Coordination Interventions    Program Enrollment: Complex Care  Referral from Primary Care Provider: No  Suggested Interventions and Community Resources  Other Therapy Services: Completed (Comment: 7/6: Pt's wound is healed and she has been discharged from wound clinic. )  Zone Management Tools: Completed (Comment: 7/6: Pt has no other barriers or concerns regarding her healthcare outcomes. Her A1c is 5.4. Has f/u w PCP scheduled for 7/7. She is checking her INR as inst. Pt does not check her BP or BS. She was encouraged to drink more fluids in am. Graduated.)         Goals Addressed                 This Visit's Progress     COMPLETED: Self Monitoring   No change     Self-Monitored Blood Glucose - I will check my blood sugar Fasting blood sugar  I will notify my provider of any trends of increasing or decreasing blood sugars over a 1 month period. I will notify my provider if I have any blood sugar readings less than 70 more than 2 times a month.   Blood Pressure - I will take my blood pressure as directed - Every other day  I will notify my provider of any trends of increasing or decreasing blood pressures over a month period of time. I will notify my provider of any changes in blood pressure associated with symptoms of dizziness, falls, passing out, headache, confusion/change in mental status. None Recently Recorded    Barriers: lack of motivation  Plan for overcoming my barriers:   Pt willing to review FBS and BP with ACM. Pt willing to keep log for review by her PCP as indicated. Family willing to assist patient as needed to check her vitals  Confidence: 8/10  Anticipated Goal Completion Date: 6/30/21              Prior to Admission medications    Medication Sig Start Date End Date Taking?  Authorizing Provider   pantoprazole (PROTONIX) 40 MG tablet TAKE 1 TABLET BY MOUTH TWICE DAILY BEFORE MEAL(S) 6/1/21   Elida Barakat MD   warfarin (COUMADIN) 7.5 MG tablet 7.5mg on Mondays and 15mg all other days  Patient taking differently: Take 15 mg by mouth daily  6/1/21   Elida Barakat MD   CPAP Machine MISC Inhale 1 each into the lungs nightly    Historical Provider, MD   Cyanocobalamin 1000 MCG/ML KIT Inject as directed every 30 days    Historical Provider, MD   gabapentin (NEURONTIN) 100 MG capsule TAKE 1 CAPSULE BY MOUTH THREE TIMES DAILY 3/18/21 7/1/21  Elida Barakat MD   ferrous gluconate (FERGON) 240 (27 Fe) MG tablet Take 1 tablet by mouth 2 times daily 3/18/21   Elida Barakat MD   calcitRIOL (ROCALTROL) 0.25 MCG capsule Take 0.25 mcg by mouth daily    Rebeca Holm MD   bumetanide (BUMEX) 2 MG tablet Take 1 tablet by mouth daily 3/18/21   Elida Barakat MD   levothyroxine (SYNTHROID) 100 MCG tablet Take 1 tablet by mouth Daily 3/18/21   Elida Barakat MD   lisinopril (PRINIVIL;ZESTRIL) 40 MG tablet Take 1 tablet by mouth once daily 3/18/21   Elida Barakat MD   baclofen (LIORESAL) 10 MG tablet Take 1 tablet by mouth 3 times daily As needed for hip pain 3/18/21   Elida Barakat MD   escitalopram (LEXAPRO) 20 MG tablet Take 1 tablet by mouth daily 12/14/20   Honey Bloom MD   fexofenadine (ALLEGRA) 180 MG tablet Take 1 tablet by mouth daily Indications: Allergic Rhinitis 12/14/20   Honey Bloom MD   tiotropium (SPIRIVA RESPIMAT) 2.5 MCG/ACT AERS inhaler Inhale 2 puffs into the lungs daily 11/23/20   Honey Bloom MD   potassium chloride (KLOR-CON) 10 MEQ extended release tablet Take 10 mEq by mouth daily  10/27/20   Historical Provider, MD   calcipotriene (DOVONEX) 0.005 % cream Use topically as needed 7/7/20   Honey Bloom MD   clobetasol (TEMOVATE) 0.05 % cream Apply topically 2 times daily.  7/7/20   Honey Bloom MD   ondansetron (ZOFRAN) 4 MG tablet Take 1 tablet by mouth daily as needed for Nausea or Vomiting 1/31/20   Honey Bloom MD   Multiple Vitamins-Minerals (CENTRUM ADULTS) TABS Take 1 tablet by mouth daily    Historical Provider, MD   aspirin 81 MG tablet Take 81 mg by mouth daily    Historical Provider, MD       Future Appointments   Date Time Provider Jose Shea   7/7/2021  9:45 AM MD HIPOLITO Salazar [facility]   12/28/2021 10:00 AM CHANELLE Rodriguez [facility]   12/28/2021 10:00 AM SCHEDULE, L MED ONC MA L MED ONC Shannan BOOTH

## 2021-07-07 ENCOUNTER — OFFICE VISIT (OUTPATIENT)
Dept: INTERNAL MEDICINE | Age: 71
End: 2021-07-07
Payer: MEDICARE

## 2021-07-07 VITALS
SYSTOLIC BLOOD PRESSURE: 138 MMHG | RESPIRATION RATE: 22 BRPM | WEIGHT: 237 LBS | OXYGEN SATURATION: 97 % | HEIGHT: 67 IN | BODY MASS INDEX: 37.2 KG/M2 | DIASTOLIC BLOOD PRESSURE: 72 MMHG | HEART RATE: 68 BPM

## 2021-07-07 DIAGNOSIS — K21.9 GASTROESOPHAGEAL REFLUX DISEASE WITHOUT ESOPHAGITIS: ICD-10-CM

## 2021-07-07 DIAGNOSIS — Z86.16 HISTORY OF 2019 NOVEL CORONAVIRUS DISEASE (COVID-19): ICD-10-CM

## 2021-07-07 DIAGNOSIS — H35.029: ICD-10-CM

## 2021-07-07 DIAGNOSIS — E11.9 DIET-CONTROLLED DIABETES MELLITUS (HCC): Primary | ICD-10-CM

## 2021-07-07 DIAGNOSIS — E55.9 VITAMIN D DEFICIENCY: ICD-10-CM

## 2021-07-07 DIAGNOSIS — Z79.899 HIGH RISK MEDICATION USE: ICD-10-CM

## 2021-07-07 DIAGNOSIS — E78.5 HYPERLIPIDEMIA, UNSPECIFIED HYPERLIPIDEMIA TYPE: ICD-10-CM

## 2021-07-07 DIAGNOSIS — E03.9 HYPOTHYROIDISM, UNSPECIFIED TYPE: ICD-10-CM

## 2021-07-07 DIAGNOSIS — G47.30 SLEEP APNEA, UNSPECIFIED TYPE: ICD-10-CM

## 2021-07-07 DIAGNOSIS — D50.9 IRON DEFICIENCY ANEMIA, UNSPECIFIED IRON DEFICIENCY ANEMIA TYPE: ICD-10-CM

## 2021-07-07 DIAGNOSIS — F32.89 OTHER DEPRESSION: ICD-10-CM

## 2021-07-07 DIAGNOSIS — I10 ESSENTIAL HYPERTENSION: ICD-10-CM

## 2021-07-07 DIAGNOSIS — G62.9 NEUROPATHY: ICD-10-CM

## 2021-07-07 DIAGNOSIS — E53.8 B12 DEFICIENCY: ICD-10-CM

## 2021-07-07 PROCEDURE — 1036F TOBACCO NON-USER: CPT | Performed by: INTERNAL MEDICINE

## 2021-07-07 PROCEDURE — 80305 DRUG TEST PRSMV DIR OPT OBS: CPT | Performed by: INTERNAL MEDICINE

## 2021-07-07 PROCEDURE — 3044F HG A1C LEVEL LT 7.0%: CPT | Performed by: INTERNAL MEDICINE

## 2021-07-07 PROCEDURE — G8399 PT W/DXA RESULTS DOCUMENT: HCPCS | Performed by: INTERNAL MEDICINE

## 2021-07-07 PROCEDURE — 1090F PRES/ABSN URINE INCON ASSESS: CPT | Performed by: INTERNAL MEDICINE

## 2021-07-07 PROCEDURE — 99214 OFFICE O/P EST MOD 30 MIN: CPT | Performed by: INTERNAL MEDICINE

## 2021-07-07 PROCEDURE — 2022F DILAT RTA XM EVC RTNOPTHY: CPT | Performed by: INTERNAL MEDICINE

## 2021-07-07 PROCEDURE — 4040F PNEUMOC VAC/ADMIN/RCVD: CPT | Performed by: INTERNAL MEDICINE

## 2021-07-07 PROCEDURE — G8417 CALC BMI ABV UP PARAM F/U: HCPCS | Performed by: INTERNAL MEDICINE

## 2021-07-07 PROCEDURE — 1123F ACP DISCUSS/DSCN MKR DOCD: CPT | Performed by: INTERNAL MEDICINE

## 2021-07-07 PROCEDURE — 3017F COLORECTAL CA SCREEN DOC REV: CPT | Performed by: INTERNAL MEDICINE

## 2021-07-07 PROCEDURE — 96372 THER/PROPH/DIAG INJ SC/IM: CPT | Performed by: INTERNAL MEDICINE

## 2021-07-07 PROCEDURE — G8427 DOCREV CUR MEDS BY ELIG CLIN: HCPCS | Performed by: INTERNAL MEDICINE

## 2021-07-07 RX ORDER — SUCRALFATE 1 G/1
1 TABLET ORAL 4 TIMES DAILY
Qty: 120 TABLET | Refills: 3 | Status: SHIPPED | OUTPATIENT
Start: 2021-07-07 | End: 2022-02-14 | Stop reason: SDUPTHER

## 2021-07-07 RX ORDER — ESCITALOPRAM OXALATE 20 MG/1
20 TABLET ORAL DAILY
Qty: 90 TABLET | Refills: 3 | Status: SHIPPED | OUTPATIENT
Start: 2021-07-07 | End: 2022-03-01 | Stop reason: SDUPTHER

## 2021-07-07 RX ORDER — CYANOCOBALAMIN 1000 UG/ML
1000 INJECTION INTRAMUSCULAR; SUBCUTANEOUS ONCE
Status: COMPLETED | OUTPATIENT
Start: 2021-07-07 | End: 2021-07-07

## 2021-07-07 RX ADMIN — CYANOCOBALAMIN 1000 MCG: 1000 INJECTION INTRAMUSCULAR; SUBCUTANEOUS at 10:57

## 2021-07-07 NOTE — PROGRESS NOTES
Chief Complaint   Patient presents with    3 Month Follow-Up       HPI: Raffy Springer is a 66 y.o. female is here for for follow-up of type 2 diabetes. She is off of her medications. Her A1c is 5.4. She does have a history of hypertension. Her blood pressure is well controlled. She denies any complaints of chest pain, chest pressure or shortness of breath. She feels that her CPAP works well. However, sometimes she has difficulty with her mask. Her acid reflux is fairly well controlled. However, at times when she eats, she feels like she is doubling over with pain. She states that she has had a recent endoscopy. We can try Carafate. However, if her symptoms do not improve, she will need to follow-up with GI. 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 She has had DVTs in the past.  She is taking her Coumadin as prescribed. Her last INR was 8.0. Her Coumadin was on hold. It is now therapeutic. She will restart her Coumadin at her current dose. Her neuropathy is relatively stable. She is due for a B12 injection today, which we will give her. Her anemia remained stable. She is on calcitriol for vitamin D deficiency. Her thyroid functions within normal limits. She denies any complaints of chest pain, chest pressure or shortness of breath. Her cholesterol did go up from 2 25-2 35. She admits to eating a lot of fried chicken. She plans to start working on her diet and her exercise.     Past Medical History:   Diagnosis Date    Abdominal pain     Abnormal EKG     Acute sinusitis     Allergic reaction to spider bite     Anemia     Anticoagulated     Anxiety     Arrhythmia     Asthmatic bronchitis without complication 8/69/5871    Ataxic gait     Lyons's esophagus     Bowel obstruction (HCC)     Burn of abdomen wall, second degree, initial encounter 8/27/2018    Callus     Cardiac pacemaker     CKD (chronic kidney disease) stage 2, GFR 60-89 ml/min     Coat's syndrome     Coat's syndrome     both eyes    Depression     Diabetes mellitus type 2 in nonobese (HCC)     Diabetic nephropathy (HCC)     Disequilibrium     Dizziness     DVT (deep venous thrombosis) (HCC)     Exudative retinopathy     Fibromyalgia     Fibromyositis     Gastric ulcer     GERD (gastroesophageal reflux disease)     History of gastric bypass     Hx of lupus anticoagulant disorder     Hypertension     Hypothyroidism     Intermittent claudication (HCC)     Intestinal obstruction (HCC)     Iron deficiency     Left-sided weakness     Low vitamin D level     Lupus (HCC)     Menopause     Obesity     Osteoarthritis     Osteoporosis     Other iron deficiency anemias     Palliative care patient 09/24/2020    Pernicious anemia     PUPP (pruritic urticarial papules and plaques of pregnancy)     Right leg numbness     Right sided sciatica     Sarcoidosis     with liver involvement    Sciatica     Secondary hyperparathyroidism (HCC)     Shingles     Shortness of breath     Sleep apnea     Stomach ulcer     Syncope     Visual loss, one eye       Past Surgical History:   Procedure Laterality Date    APPENDECTOMY      CARDIAC CATHETERIZATION  10/21/13  [facility]    EF over 60%    CHOLECYSTECTOMY      COLONOSCOPY  02/2010    negative    COLONOSCOPY  02/22/2010    Dr Myriam Daniels    COLONOSCOPY  04/01/2016    Dr LAKHWINDER Horvath-internal hemorrhoids, 5 yr recall    COLONOSCOPY N/A 05/07/2021    Dr Valdo Horvath-Significant looping/tortuosity throughout the left colon, BE=Normal    DILATATION, ESOPHAGUS      EYE SURGERY      Cyst on Right eye    EYE SURGERY      GASTRIC BYPASS SURGERY      GASTRIC BYPASS SURGERY      HERNIA REPAIR      HYSTERECTOMY      Complete    HYSTERECTOMY      Partial - because had a tubal pregnancy.      INCONTINENCE SURGERY      Bladder Sling    INFECTED SKIN DEBRIDEMENT Right     dog bite right forearm    OTHER SURGICAL HISTORY      IVC filter    PACEMAKER INSERTION      PACEMAKER PLACEMENT      medtronic    UT TOTAL KNEE ARTHROPLASTY Right 03/26/2018    TOTAL KNEE REPLACEMENT COMPLEX PRIMARY performed by Donald Menard MD at [address] [facility] SMALL INTESTINE SURGERY      SPLENECTOMY      KRISTEL AND BSO      TONSILLECTOMY AND ADENOIDECTOMY      TOTAL KNEE ARTHROPLASTY      UPPER GASTROINTESTINAL ENDOSCOPY  12/2011    gerd s/p gastric bypass    UPPER GASTROINTESTINAL ENDOSCOPY  02/2014    normal s/p gastric bypass    UPPER GASTROINTESTINAL ENDOSCOPY  02/2010    biopsy neg Barretts, chronic reflux esophagitis s/p gastric bypass    UPPER GASTROINTESTINAL ENDOSCOPY  07/2006    unremarkable s/p gastric bypass    UPPER GASTROINTESTINAL ENDOSCOPY  08/10/2015    Dr Marrero Mass UPPER GASTROINTESTINAL ENDOSCOPY N/A 04/01/2016    Dr. Renetta Hill:  H Pylori(-), HH, o/w normal    UPPER GASTROINTESTINAL ENDOSCOPY N/A 05/07/2021    Dr Kam Brumfield hiatal hernia, previous gastric bypass surgery, gastritis    VENA CAVA FILTER PLACEMENT Right 2003      Social History     Socioeconomic History    Marital status:      Spouse name: None    Number of children: 1    Years of education: None    Highest education level: None   Occupational History     Employer: [employer]    Tobacco Use    Smoking status: Never Smoker    Smokeless tobacco: Never Used   Vaping Use    Vaping Use: Never used   Substance and Sexual Activity    Alcohol use: No    Drug use: No    Sexual activity: Not Currently   Other Topics Concern    None   Social History Narrative    Lives with daughter, son-in-law, granddaughter, niece. Drives very little, daughter usually transports pt. Works part time at [employer]. Has pet dog.      Social Determinants of Health     Financial Resource Strain: Low Risk     Difficulty of Paying Living Expenses: Not very hard   Food Insecurity: No Food Insecurity    Worried About Running Out of Food in the Last Year: Never true    Cher of Food in the Last Year: Never true   Transportation Needs: No Transportation Needs    Lack of Transportation (Medical): No    Lack of Transportation (Non-Medical): No   Physical Activity: Inactive    Days of Exercise per Week: 0 days    Minutes of Exercise per Session: 0 min   Stress: No Stress Concern Present    Feeling of Stress : Only a little   Social Connections: Moderately Integrated    Frequency of Communication with Friends and Family: More than three times a week    Frequency of Social Gatherings with Friends and Family: More than three times a week    Attends [employer] Services: More than 4 times per year    Active Member of [employer] or Organizations:  Yes    Attends Club or Organization Meetings: More than 4 times per year    Marital Status:    Intimate Partner Violence:     Fear of Current or Ex-Partner:     Emotionally Abused:     Physically Abused:     Sexually Abused:       Family History   Problem Relation Age of Onset    Uterine Cancer Mother     Cervical Cancer Mother     Coronary Art Dis Mother     Heart Disease Father     Lung Cancer Father     Other Father         renal failure    Colon Cancer Brother     Colon Polyps Brother     Uterine Cancer Maternal Grandmother     Cervical Cancer Maternal Grandmother     Diabetes Maternal Grandmother     Cervical Cancer Sister     Other Sister         fibromyalgia    Diabetes Paternal Aunt     Breast Cancer Maternal Cousin     Esophageal Cancer Neg Hx     Liver Cancer Neg Hx     Liver Disease Neg Hx     Stomach Cancer Neg Hx     Rectal Cancer Neg Hx         Current Outpatient Medications   Medication Sig Dispense Refill    escitalopram (LEXAPRO) 20 MG tablet Take 1 tablet by mouth daily 90 tablet 3    sucralfate (CARAFATE) 1 GM tablet Take 1 tablet by mouth 4 times daily 120 tablet 3    pantoprazole (PROTONIX) 40 MG tablet TAKE 1 TABLET BY MOUTH TWICE DAILY BEFORE MEAL(S) 180 tablet 0    warfarin (COUMADIN) 7.5 MG tablet 7.5mg on Mondays and 15mg all other days (Patient taking differently: Take 15 mg by mouth daily ) 180 tablet 3    CPAP Machine MISC Inhale 1 each into the lungs nightly      Cyanocobalamin 1000 MCG/ML KIT Inject as directed every 30 days      gabapentin (NEURONTIN) 100 MG capsule TAKE 1 CAPSULE BY MOUTH THREE TIMES DAILY 270 capsule 0    ferrous gluconate (FERGON) 240 (27 Fe) MG tablet Take 1 tablet by mouth 2 times daily 180 tablet 2    calcitRIOL (ROCALTROL) 0.25 MCG capsule Take 0.25 mcg by mouth daily      bumetanide (BUMEX) 2 MG tablet Take 1 tablet by mouth daily 30 tablet 5    levothyroxine (SYNTHROID) 100 MCG tablet Take 1 tablet by mouth Daily 90 tablet 3    lisinopril (PRINIVIL;ZESTRIL) 40 MG tablet Take 1 tablet by mouth once daily 90 tablet 1    baclofen (LIORESAL) 10 MG tablet Take 1 tablet by mouth 3 times daily As needed for hip pain 10 tablet 1    fexofenadine (ALLEGRA) 180 MG tablet Take 1 tablet by mouth daily Indications: Allergic Rhinitis 90 tablet 3    tiotropium (SPIRIVA RESPIMAT) 2.5 MCG/ACT AERS inhaler Inhale 2 puffs into the lungs daily 1 Inhaler 5    potassium chloride (KLOR-CON) 10 MEQ extended release tablet Take 10 mEq by mouth daily       calcipotriene (DOVONEX) 0.005 % cream Use topically as needed 3 Tube 3    clobetasol (TEMOVATE) 0.05 % cream Apply topically 2 times daily.  3 Tube 3    ondansetron (ZOFRAN) 4 MG tablet Take 1 tablet by mouth daily as needed for Nausea or Vomiting 30 tablet 0    Multiple Vitamins-Minerals (CENTRUM ADULTS) TABS Take 1 tablet by mouth daily      aspirin 81 MG tablet Take 81 mg by mouth daily       Current Facility-Administered Medications   Medication Dose Route Frequency Provider Last Rate Last Admin    cyanocobalamin injection 1,000 mcg  1,000 mcg Intramuscular Once Ishan Toribio MD            Patient Active Problem List   Diagnosis    GERD (gastroesophageal reflux disease)    History of gastric bypass    Family history of colon cancer    Stable angina ([facility]    Encounter for current long-term use of anticoagulants    Primary osteoarthritis of right knee    Arthritis of knee    Essential hypertension    Hyperglycemia    MER (obstructive sleep apnea)    Iron deficiency anemia    Restrictive airway disease    DVT, lower extremity, proximal, acute, unspecified laterality ([facility]    H/O systemic lupus erythematosus (SLE) ([facility]    Anticoagulated on Coumadin    Postmenopausal osteoporosis    Type II diabetes mellitus with nephropathy ([facility]    Pacemaker    History of DVT (deep vein thrombosis)    [facility]us [facility] disorder ([facility]    History of pulmonary embolism    Thrombocytopenia ([facility]    Hypothyroidism    Morbidly obese ([facility]    Asthmatic bronchitis without complication    Gastroenteritis    Colic    Abdominal pain    Intractable nausea and vomiting    Severe episode of recurrent major depressive disorder, without psychotic features ([facility]    Lower abdominal pain    Chronic heartburn    S/P gastric bypass    Dog bite    Cellulitis of right upper extremity    Abscess of right arm    Soft tissue infection    Skin ulcer of upper arm, with fat layer exposed ([facility]        Review of Systems   Constitutional: Positive for fatigue. Negative for activity change, appetite change, chills, diaphoresis, fever and unexpected weight change. HENT: Negative for congestion, ear pain, hearing loss, nosebleeds, postnasal drip, rhinorrhea, sinus pressure, sinus pain, sneezing, sore throat, tinnitus, trouble swallowing and voice change. Eyes: Negative for discharge and itching. Watery eyes   Respiratory: Negative for apnea, cough, chest tightness, shortness of breath, wheezing and stridor. Cardiovascular: Positive for leg swelling. Negative for chest pain and palpitations. Left leg swelling; chronic secondary to DVT.    Gastrointestinal: Negative for abdominal distention, abdominal pain, blood in stool, constipation, diarrhea, nausea and vomiting. Endocrine: Negative for cold intolerance, heat intolerance, polydipsia, polyphagia and polyuria. Genitourinary: Negative for difficulty urinating, dysuria, flank pain, frequency, hematuria and urgency. Musculoskeletal: Positive for arthralgias and back pain. Negative for gait problem, joint swelling, myalgias, neck pain and neck stiffness. She had bilateral knee pain. Right hip pain   Skin: Negative for color change, pallor, rash and wound. Allergic/Immunologic: Positive for environmental allergies. Negative for food allergies and immunocompromised state. Neurological: Negative for dizziness, tremors, seizures, syncope, facial asymmetry, speech difficulty, weakness, light-headedness, numbness and headaches. Hematological: Negative for adenopathy. Does not bruise/bleed easily. Psychiatric/Behavioral: Positive for dysphoric mood. Negative for agitation, confusion, decreased concentration and hallucinations. The patient is not nervous/anxious and is not hyperactive. Mood has improved       /72   Pulse 68   Resp 22   Ht 5' 7\" (1.702 m)   Wt 237 lb (107.5 kg)   SpO2 97%   BMI 37.12 kg/m²   Physical Exam  Vitals and nursing note reviewed. Constitutional:       General: She is not in acute distress. Appearance: Normal appearance. She is well-developed. She is obese. She is not ill-appearing, toxic-appearing or diaphoretic. HENT:      Head: Normocephalic and atraumatic. Right Ear: Tympanic membrane, ear canal and external ear normal. There is no impacted cerumen. Left Ear: Tympanic membrane, ear canal and external ear normal.      Nose: Nose normal. No congestion or rhinorrhea. Mouth/Throat:      Mouth: Mucous membranes are moist.      Pharynx: Oropharynx is clear. No oropharyngeal exudate or posterior oropharyngeal erythema. Eyes:      General: No scleral icterus. Right eye: No discharge.          Left eye: No discharge. Extraocular Movements: Extraocular movements intact. Conjunctiva/sclera: Conjunctivae normal.      Pupils: Pupils are equal, round, and reactive to light. Neck:      Thyroid: No thyromegaly. Vascular: No carotid bruit or JVD. Trachea: No tracheal deviation. Cardiovascular:      Rate and Rhythm: Normal rate and regular rhythm. Pulses: Normal pulses. Heart sounds: Normal heart sounds. No murmur heard. No friction rub. No gallop. Pulmonary:      Effort: Pulmonary effort is normal. No respiratory distress. Breath sounds: Normal breath sounds. No stridor. No wheezing, rhonchi or rales. Chest:      Chest wall: No tenderness. Abdominal:      General: Abdomen is flat. Bowel sounds are normal. There is no distension. Palpations: Abdomen is soft. There is no mass. Tenderness: There is no abdominal tenderness. There is no right CVA tenderness, left CVA tenderness, guarding or rebound. Hernia: No hernia is present. Musculoskeletal:         General: No swelling, tenderness, deformity or signs of injury. Normal range of motion. Cervical back: Normal range of motion. No rigidity. No muscular tenderness. Lumbar back: Spasms present. No swelling or bony tenderness. Normal range of motion. Back:       Right lower leg: No edema. Left lower leg: No edema. Comments:  Does have some hip pain on palpation of the left hip    Visual inspection:  Deformity/amputation: absent  Skin lesions/pre-ulcerative calluses: present to bottom of the left foot  Edema: right- negative, left- negative    Sensory exam:  Monofilament sensation: normal  (minimum of 5 random plantar locations tested, avoiding callused areas - > 1 area with absence of sensation is + for neuropathy)    Plus at least one of the following:  Pulses: normal,   Pinprick: Intact  Proprioception: N/A  Vibration (128 Hz): N/A   Lymphadenopathy:      Cervical: No cervical adenopathy. Skin:     General: Skin is warm and dry. Capillary Refill: Capillary refill takes less than 2 seconds. Coloration: Skin is not jaundiced or pale. Findings: No bruising, erythema, lesion or rash. Comments: Wound to right upper extremity appears to be healing without any evidence of further infection. There is packing in place. The wound dressing was changed by nursing staff today. Neurological:      General: No focal deficit present. Mental Status: She is alert and oriented to person, place, and time. Mental status is at baseline. Cranial Nerves: No cranial nerve deficit. Sensory: No sensory deficit. Motor: No weakness or abnormal muscle tone. Coordination: Coordination normal.      Gait: Gait normal.      Deep Tendon Reflexes: Reflexes normal.   Psychiatric:         Mood and Affect: Mood normal. Mood is not anxious or depressed. Behavior: Behavior normal.         Thought Content: Thought content normal.         Judgment: Judgment normal.         1. Diet-controlled diabetes mellitus ([facility]    2. High risk medication use    3. Vitamin D deficiency    4. B12 deficiency    5. Hyperlipidemia, unspecified hyperlipidemia type     6. Hypothyroidism, unspecified type     7. Other depression    8. Gastroesophageal reflux disease without esophagitis    9. Sleep apnea, unspecified type    10. Neuropathy    11. Iron deficiency anemia, unspecified iron deficiency anemia type    12. Coats' disease, unspecified laterality    13. Essential hypertension    14. History of 2019 novel coronavirus disease (COVID-19)        ASSESSMENT/PLAN:    69-year-old woman here for follow-up    1. Diet-controlled diabetes: A1c at goal    2. B12 deficiency: B12 injection given today    3. Depression: Stable on Lexapro    4. Acid reflux: Continue Protonix.   I went to try Carafate for the complaints of abdominal pain after she eats, but if symptoms persist, she will need GI evaluation possibly an endoscopy to rule out peptic ulcer disease. 5.  Sleep apnea: Continue CPAP as prescribed    6. Neuropathy: Stable on gabapentin    7. Iron deficiency anemia: Continue gluconate    8. Vitamin D deficiency: Continue supplementation    9. Coat syndrome: Continue anticoagulation    10. Hypothyroidism: Continue levothyroxine at current dose    11. Hypertension: Blood pressure currently well controlled    12. History of COVID-19 infection: Continue Spiriva as needed for shortness of breath. However, she is not having any issues at this time    15. Hyperlipidemia: Continue to work on diet and exercise        Trina Mcdonald was seen today for 3 month follow-up. Diagnoses and all orders for this visit:    Diet-controlled diabetes mellitus ([facility]  -     CBC Auto Differential; Future  -     Comprehensive Metabolic Panel; Future  -     Hemoglobin A1C; Future  -     Lipid Panel; Future  -     TSH without Reflex; Future  -     Microalbumin / Creatinine Urine Ratio; Future    High risk medication use  -     POCT Rapid Drug Screen    Vitamin D deficiency  -     Vitamin D 25 Hydroxy; Future    B12 deficiency  -     Vitamin B12; Future  -     Cancel: [place] VITAMIN B12 INJECTION    Hyperlipidemia, unspecified hyperlipidemia type   -     Lipid Panel; Future    Hypothyroidism, unspecified type   -     TSH without Reflex; Future    Other depression    Gastroesophageal reflux disease without esophagitis    Sleep apnea, unspecified type    Neuropathy    Iron deficiency anemia, unspecified iron deficiency anemia type    Coats' disease, unspecified laterality    Essential hypertension    History of 2019 novel coronavirus disease (COVID-19)    Other orders  -     escitalopram (LEXAPRO) 20 MG tablet; Take 1 tablet by mouth daily  -     sucralfate (CARAFATE) 1 GM tablet; Take 1 tablet by mouth 4 times daily  -     cyanocobalamin injection 1,000 mcg          Return in about 3 months (around 10/7/2021).      Orders Placed This Encounter   Procedures    CBC Auto Differential     Fast 12 hours     Standing Status:   Future     Standing Expiration Date:   7/7/2022    Comprehensive Metabolic Panel     Fasting 12 hours     Standing Status:   Future     Standing Expiration Date:   7/7/2022    Hemoglobin A1C     Fast 12 hours     Standing Status:   Future     Standing Expiration Date:   7/7/2022    Lipid Panel     Standing Status:   Future     Standing Expiration Date:   7/7/2022     Order Specific Question:   Is Patient Fasting?/# of Hours     Answer:   12    TSH without Reflex     Fast 12 hours     Standing Status:   Future     Standing Expiration Date:   7/7/2022    Microalbumin / Creatinine Urine Ratio     Standing Status:   Future     Standing Expiration Date:   7/7/2022    Vitamin B12     Standing Status:   Future     Standing Expiration Date:   7/7/2022    Vitamin D 25 Hydroxy     Standing Status:   Future     Standing Expiration Date:   7/7/2022    POCT Rapid Drug Jarvis Carl MD

## 2021-07-08 ASSESSMENT — ENCOUNTER SYMPTOMS
BACK PAIN: 1
WHEEZING: 0
NAUSEA: 0
SINUS PAIN: 0
SHORTNESS OF BREATH: 0
COLOR CHANGE: 0
APNEA: 0
VOMITING: 0
SORE THROAT: 0
SINUS PRESSURE: 0
COUGH: 0
VOICE CHANGE: 0
DIARRHEA: 0
STRIDOR: 0
ABDOMINAL DISTENTION: 0
ABDOMINAL PAIN: 0
RHINORRHEA: 0
BLOOD IN STOOL: 0
TROUBLE SWALLOWING: 0
EYE DISCHARGE: 0
CHEST TIGHTNESS: 0
EYE ITCHING: 0
CONSTIPATION: 0

## 2021-07-09 LAB — INR BLD: 2.6

## 2021-07-12 ENCOUNTER — ANTI-COAG VISIT (OUTPATIENT)
Dept: INTERNAL MEDICINE | Age: 71
End: 2021-07-12
Payer: MEDICARE

## 2021-07-12 DIAGNOSIS — Z79.01 ANTICOAGULATED ON COUMADIN: Primary | ICD-10-CM

## 2021-07-12 PROCEDURE — 93793 ANTICOAG MGMT PT WARFARIN: CPT | Performed by: INTERNAL MEDICINE

## 2021-07-12 NOTE — PROGRESS NOTES
HOME MONITORING REPORT    INR today:   Results for orders placed or performed in visit on 07/12/21   Protime-INR   Result Value Ref Range    INR 2.60        INR Goal: 2.0-3.0    Dosing Plan  As of 7/12/2021    TTR:  21.8 % (3.1 y)   Full warfarin instructions:  7.5 mg every Mon, Wed, Fri; 15 mg all other days              PLAN: Advised patient/caregiver to continue current dose and recheck in one week. Patient/Caregiver voiced understanding      Electronically signed by Tiffani Tomlinson MD on 7/12/2021 at 5:22 PM    I have reviewed nursing plan for Coumadin management and agree with plan.

## 2021-07-16 ASSESSMENT — ENCOUNTER SYMPTOMS
EYE REDNESS: 0
SHORTNESS OF BREATH: 0
EYES NEGATIVE: 1
RESPIRATORY NEGATIVE: 1

## 2021-07-19 ENCOUNTER — ANTI-COAG VISIT (OUTPATIENT)
Dept: FAMILY MEDICINE CLINIC | Age: 71
End: 2021-07-19
Payer: MEDICARE

## 2021-07-19 DIAGNOSIS — Z79.01 ANTICOAGULATED ON COUMADIN: Primary | ICD-10-CM

## 2021-07-19 LAB — INR BLD: 2.2

## 2021-07-19 PROCEDURE — 93793 ANTICOAG MGMT PT WARFARIN: CPT | Performed by: INTERNAL MEDICINE

## 2021-07-19 NOTE — PROGRESS NOTES
HOME MONITORING REPORT    INR today: No results found for this visit on 07/19/21. INR Goal: 2.0-3.0    Dosing Plan  As of 7/19/2021    TTR:  21.8 % (3.1 y)              PLAN: Advised patient/caregiver to continue current dose and recheck in one week. Patient/Caregiver voiced understanding      Electronically signed by Honey Bloom MD on 7/19/2021 at 10:31 AM    I have reviewed nursing plan for Coumadin management and agree with plan.

## 2021-07-29 ENCOUNTER — ANTI-COAG VISIT (OUTPATIENT)
Dept: PRIMARY CARE CLINIC | Age: 71
End: 2021-07-29
Payer: MEDICARE

## 2021-07-29 DIAGNOSIS — Z79.01 ANTICOAGULATED ON COUMADIN: Primary | ICD-10-CM

## 2021-07-29 LAB — INR BLD: 1.8

## 2021-07-29 PROCEDURE — 93793 ANTICOAG MGMT PT WARFARIN: CPT | Performed by: INTERNAL MEDICINE

## 2021-08-16 ENCOUNTER — OFFICE VISIT (OUTPATIENT)
Dept: CARDIOLOGY | Facility: CLINIC | Age: 71
End: 2021-08-16

## 2021-08-16 VITALS
WEIGHT: 239 LBS | OXYGEN SATURATION: 98 % | SYSTOLIC BLOOD PRESSURE: 160 MMHG | HEART RATE: 77 BPM | HEIGHT: 67 IN | BODY MASS INDEX: 37.51 KG/M2 | DIASTOLIC BLOOD PRESSURE: 100 MMHG

## 2021-08-16 DIAGNOSIS — I34.0 NONRHEUMATIC MITRAL VALVE REGURGITATION: Primary | ICD-10-CM

## 2021-08-16 DIAGNOSIS — E78.2 MIXED HYPERLIPIDEMIA: ICD-10-CM

## 2021-08-16 DIAGNOSIS — I10 ESSENTIAL HYPERTENSION: ICD-10-CM

## 2021-08-16 DIAGNOSIS — D68.62 LUPUS ANTICOAGULANT DISORDER (HCC): ICD-10-CM

## 2021-08-16 DIAGNOSIS — Z95.0 PACEMAKER: ICD-10-CM

## 2021-08-16 PROCEDURE — 93000 ELECTROCARDIOGRAM COMPLETE: CPT | Performed by: INTERNAL MEDICINE

## 2021-08-16 PROCEDURE — 99214 OFFICE O/P EST MOD 30 MIN: CPT | Performed by: INTERNAL MEDICINE

## 2021-08-16 RX ORDER — CALCITRIOL 0.25 UG/1
0.25 CAPSULE, LIQUID FILLED ORAL DAILY
COMMUNITY
Start: 2021-03-19

## 2021-08-16 RX ORDER — SUCRALFATE 1 G/1
1 TABLET ORAL 4 TIMES DAILY
COMMUNITY
Start: 2021-07-07

## 2021-08-16 NOTE — PROGRESS NOTES
Referring Provider: Zora Jackson MD    Reason for Follow-up Visit: s/p pacemaker    Subjective .   Chief Complaint:   Chief Complaint   Patient presents with   • Follow-up     yearly   • Hypertension     pt states she does not check at home.   • Hyperlipidemia     Last lab done 3/2021 LDL was 120 on no medication   • Pacemaker Check     was due to have checked today.   • Shortness of Breath     pt states she is still having some sob and light headed and dizzy at times.  she is getting strangled at times too.       History of present illness:  Veronica Stewart is a 71 y.o. yo female with history of SSS, s/p pacemaker placement in the past in for routine follow up. She denies any chest pain but has been having SOB.        History  Past Medical History:   Diagnosis Date   • Anxiety    • Arrhythmia    • Blood clot in vein 05/2018    Hospitalized    • Arben-tachy syndrome (CMS/HCC)    • Bradycardia    • Breath shortness    • Burn     left leg   • Cardiac pacemaker     11/09 [facility]   • Chest pain    • Chronic fatigue    • Depression    • Diabetes mellitus (CMS/HCC)    • Dizziness    • Fatigue    • Follow-up exam    • Heart burn    • Hypercoagulable state, primary (CMS/HCC)     LUPUS ANTI-COAG   • Hyperlipidemia    • Hypertension    • Liver disease    • Shortness of breath    • Sleep apnea     complex.  using a c-pap machine   • Stroke (CMS/HCC)    • Syncope    • Tachy-arben syndrome (CMS/HCC)    ,   Past Surgical History:   Procedure Laterality Date   • APPENDECTOMY     • CARDIAC ELECTROPHYSIOLOGY PROCEDURE N/A 2/26/2020    Procedure: PPM generator change - dual;  Surgeon: Ronny Lowe MD;  Location: [facility] CATH INVASIVE LOCATION;  Service: Cardiology;  Laterality: N/A;   • CATARACT EXTRACTION     • CHOLECYSTECTOMY     • HERNIA REPAIR     • HYSTERECTOMY     • INSERT / REPLACE / REMOVE PACEMAKER     • OOPHORECTOMY     • PACEMAKER IMPLANTATION     • REPLACEMENT TOTAL KNEE Right 03/26/2018    Dr doherty    • TRAM-EN-Y  PROCEDURE  2002    Dr Dahiana [facility]   • SPLENECTOMY     ,   Family History   Problem Relation Age of Onset   • Coronary artery disease Mother    • Hyperlipidemia Mother    • Anemia Mother    • Cervical cancer Mother    • Kidney failure Father    • Heart failure Father    • Fibromyalgia Sister    • Anemia Sister    • Clotting disorder Sister    • Alcohol abuse Brother    • Cancer Maternal Grandmother    • Diabetes Maternal Grandmother    • Heart failure Maternal Grandfather    • No Known Problems Paternal Grandmother    • No Known Problems Paternal Grandfather    • Colon cancer Brother    ,   Social History     Tobacco Use   • Smoking status: Never Smoker   • Smokeless tobacco: Never Used   Vaping Use   • Vaping Use: Never used   Substance Use Topics   • Alcohol use: Never   • Drug use: Never   ,     Medications  Current Outpatient Medications   Medication Sig Dispense Refill   • aspirin 81 MG EC tablet daily.     • baclofen (LIORESAL) 10 MG tablet TAKE 1 TABLET BY MOUTH THREE TIMES DAILY AS NEEDED FOR HIP PAIN     • bumetanide (BUMEX) 1 MG tablet Take 2 mg by mouth Daily.     • calcipotriene (DOVONEX) 0.005 % cream Apply  topically to the appropriate area as directed As Needed.     • calcitriol (ROCALTROL) 0.25 MCG capsule Take 0.25 mcg by mouth Daily.     • clobetasol (TEMOVATE) 0.05 % cream Apply  topically 2 (Two) Times a Day.     • CloNIDine (CATAPRES) 0.1 MG tablet Take 0.1 mg by mouth 2 (Two) Times a Day As Needed.     • cyanocobalamin 1000 MCG/ML injection Inject 1,000 mcg into the shoulder, thigh, or buttocks Every 28 (Twenty-Eight) Days.     • escitalopram (LEXAPRO) 20 MG tablet Take 1 tablet by mouth Daily. 30 tablet 2   • Ferrous Gluconate 239 (27 Fe) MG tablet Take 1 tablet by mouth 3 (Three) Times a Day.     • fexofenadine (ALLEGRA) 180 MG tablet Take 180 mg by mouth Daily.     • gabapentin (NEURONTIN) 100 MG capsule Take 100 mg by mouth 3 (Three) Times a Day.     • levothyroxine (SYNTHROID,  "LEVOTHROID) 100 MCG tablet Take 100 mcg by mouth Daily.     • lisinopril (PRINIVIL,ZESTRIL) 40 MG tablet Take 40 mg by mouth Daily.     • Multiple Vitamins-Minerals (MULTIVITAMIN WITH MINERALS) tablet tablet Take 1 tablet by mouth Daily.     • ondansetron (ZOFRAN) 4 MG tablet Take 4 mg by mouth Daily As Needed for Nausea or Vomiting.     • pantoprazole (PROTONIX) 40 MG EC tablet Take 40 mg by mouth Daily.     • sucralfate (CARAFATE) 1 g tablet Take 1 g by mouth 4 (Four) Times a Day.     • tiotropium (SPIRIVA HANDIHALER) 18 MCG per inhalation capsule Place 18 mcg into inhaler and inhale Daily.     • warfarin (COUMADIN) 7.5 MG tablet Take 7.5 mg by mouth. Take 7.5mg  2 tabs po daily     • amoxicillin-clavulanate (AUGMENTIN) 875-125 MG per tablet Take 1 tablet by mouth 2 (Two) Times a Day.     • Diclofenac Sodium 2 % solution Place 1 applicator on the skin as directed by provider 2 (Two) Times a Day.     • polyethylene glycol (MIRALAX) packet Take 17 g by mouth As Needed.     • pregabalin (LYRICA) 75 MG capsule Take 75 mg by mouth 3 (Three) Times a Day.     • vitamin D (ERGOCALCIFEROL) 1.25 MG (64014 UT) capsule capsule Take 50,000 Units by mouth 2 (Two) Times a Week.       No current facility-administered medications for this visit.       Allergies:  Bee venom, Insect extract allergy skin test, Percocet [oxycodone-acetaminophen], Prednisone, Tizanidine hcl, Ultram [tramadol hcl], Hydrocodone-acetaminophen, and Other    Review of Systems  Review of Systems   HENT: Negative for nosebleeds.    Cardiovascular: Positive for dyspnea on exertion and leg swelling. Negative for chest pain, claudication, near-syncope, orthopnea, palpitations, paroxysmal nocturnal dyspnea and syncope.   Respiratory: Negative for hemoptysis and shortness of breath.    Gastrointestinal: Negative for melena.   Genitourinary: Negative for hematuria.       Objective     Physical Exam:  /100   Pulse 77   Ht 170.2 cm (67\")   Wt 108 kg (239 lb) "   SpO2 98%   BMI 37.43 kg/m²   Pulmonary:      Effort: Pulmonary effort is normal.      Breath sounds: Normal breath sounds.   Cardiovascular:      Normal rate. Regular rhythm.      Murmurs: There is no murmur.   Edema:     Peripheral edema present.     Ankle: 2+ edema of the left ankle.        Results Review:    ECG 12 Lead    Date/Time: 8/16/2021 10:00 AM  Performed by: Ronny Lowe MD  Authorized by: Ronny Lowe MD   Comparison: compared with previous ECG   Similar to previous ECG  Rhythm: sinus rhythm  Rate: normal  Conduction: conduction normal  ST Segments: ST segments normal  T Waves: T waves normal  QRS axis: normal  Other findings: left ventricular hypertrophy    Clinical impression: abnormal EKG            Admission on 02/26/2020, Discharged on 02/26/2020   Component Date Value Ref Range Status   • WBC 02/26/2020 5.64  3.40 - 10.80 10*3/mm3 Final   • RBC 02/26/2020 4.36  3.77 - 5.28 10*6/mm3 Final   • Hemoglobin 02/26/2020 12.4  12.0 - 15.9 g/dL Final   • Hematocrit 02/26/2020 38.0  34.0 - 46.6 % Final   • MCV 02/26/2020 87.2  79.0 - 97.0 fL Final   • MCH 02/26/2020 28.4  26.6 - 33.0 pg Final   • MCHC 02/26/2020 32.6  31.5 - 35.7 g/dL Final   • RDW 02/26/2020 16.2* 12.3 - 15.4 % Final   • RDW-SD 02/26/2020 51.6  37.0 - 54.0 fl Final   • Platelets 02/26/2020 185  140 - 450 10*3/mm3 Final   • Protime 02/26/2020 13.4  11.9 - 14.6 Seconds Final   • INR 02/26/2020 1.03  0.91 - 1.09 Final       Assessment/Plan   Problem List Items Addressed This Visit        Cardiac and Vasculature    Essential hypertension    Current Assessment & Plan     Running high today but she did not take any of her meds this morning.         Pacemaker - Primary    Current Assessment & Plan     Normal function         Hyperlipidemia    Current Assessment & Plan     Diet controlled         Nonrheumatic mitral valve regurgitation    Current Assessment & Plan     Moderate. Needs a repeat echo            Coag and Thromboembolic     Lupus anticoagulant disorder (CMS/[facility])    Current Assessment & Plan     Anticoagulated with warfarin               Medical Complexity  must have 2 out of 3     Moderate Complexity Level 4           1 of the following medical problems:          [x]One chronic illness with mild exacerbation         [x]Two or more stable chronic illness          []One new problem  []One acute illness with systemic symptoms    Complexity of Data  Reviewed (1 out of the 3 following categories)      Category 1 tests, documents, historian (must have 3 points)       []Review of prior external records  [x]Review of results of unique tests  [x]Ordering unique tests  []Assessment requires an independent historian    Category 2 Interpretation of tests   []Independent interpretation of test read by another doc    Category 3 Discuss Management/tests  []Discussion with external physician    Risk of complications and/or morbidity          [x]Prescription Drug Management

## 2021-08-19 ENCOUNTER — ANTI-COAG VISIT (OUTPATIENT)
Dept: PRIMARY CARE CLINIC | Age: 71
End: 2021-08-19
Payer: MEDICARE

## 2021-08-19 DIAGNOSIS — Z79.01 ANTICOAGULATED ON COUMADIN: Primary | ICD-10-CM

## 2021-08-19 LAB — INR BLD: 1.3

## 2021-08-19 PROCEDURE — 93793 ANTICOAG MGMT PT WARFARIN: CPT | Performed by: INTERNAL MEDICINE

## 2021-08-19 NOTE — PROGRESS NOTES
HOME MONITORING REPORT    INR today:   Results for orders placed or performed in visit on 08/19/21   Protime-INR   Result Value Ref Range    INR 1.30        INR Goal: 2.0-3.0    Dosing Plan  As of 8/19/2021    TTR:  22.3 % (3.2 y)   Full warfarin instructions:  7.5 mg every Mon, Wed, Fri; 15 mg all other days          An Arambula states she has missed doses, she is going through a lot right now. I encouraged her to get back on track and test weekly. PLAN: Advised patient/caregiver to increase her dose to 15 mg on Friday and 11.25 on Saturday, then continue current dose and recheck in one week. Patient/Caregiver voiced understanding    Electronically signed by Miranda Sam MD on 8/19/2021 at 10:42 AM    I have reviewed nursing plan for Coumadin management and agree with plan.

## 2021-08-23 PROCEDURE — U0005 INFEC AGEN DETEC AMPLI PROBE: HCPCS | Performed by: NURSE PRACTITIONER

## 2021-08-23 PROCEDURE — U0004 COV-19 TEST NON-CDC HGH THRU: HCPCS | Performed by: NURSE PRACTITIONER

## 2021-08-26 ENCOUNTER — ANTI-COAG VISIT (OUTPATIENT)
Dept: PRIMARY CARE CLINIC | Age: 71
End: 2021-08-26
Payer: MEDICARE

## 2021-08-26 DIAGNOSIS — Z79.01 ANTICOAGULATED ON COUMADIN: Primary | ICD-10-CM

## 2021-08-26 LAB — INR BLD: 2.5

## 2021-08-26 PROCEDURE — 93793 ANTICOAG MGMT PT WARFARIN: CPT | Performed by: INTERNAL MEDICINE

## 2021-08-26 NOTE — PROGRESS NOTES
HOME MONITORING REPORT    INR today: No results found for this visit on 08/26/21. INR Goal: 2.0-3.0    Dosing Plan  As of 8/26/2021    TTR:  22.3 % (3.2 y)              PLAN: Advised patient/caregiver to continue current dose and recheck in one week. Patient/Caregiver voiced understanding      Electronically signed by Tiffani Tomlinson MD on 8/26/2021 at 11:22 AM    I have reviewed nursing plan for Coumadin management and agree with plan.

## 2021-08-30 ENCOUNTER — PATIENT MESSAGE (OUTPATIENT)
Dept: INTERNAL MEDICINE | Age: 71
End: 2021-08-30

## 2021-08-30 NOTE — TELEPHONE ENCOUNTER
From: Lorrie Wilson  To: La Busby MD  Sent: 8/30/2021 3:46 PM CDT  Subject: Prescription Question    I called [facility] to get a refill on my prescription for warfarin 7.5 mg because I have 4 left a d they said that you prescribed it. I need a refill or something else. Thank you.

## 2021-08-31 RX ORDER — WARFARIN SODIUM 7.5 MG/1
TABLET ORAL
Qty: 180 TABLET | Refills: 3 | Status: SHIPPED | OUTPATIENT
Start: 2021-08-31 | End: 2022-02-14 | Stop reason: SDUPTHER

## 2021-08-31 NOTE — TELEPHONE ENCOUNTER
I looked thru her chart to see if it was cancelled. There was a call from the pharmacy on 6/1 wanting clarification on directions. I spoke with Flora Ritchie and gave her orders per the clinic note.  It was not cancelled I attempted to call [facility] but they do not open until 9AM.

## 2021-08-31 NOTE — TELEPHONE ENCOUNTER
I spoke with [facility] and was told that the rx was cancelled per [employer]. There is a note in the chart where I clarified the directions with Rhiannon Cash the same day. She said a new rx was needed. Rx pending.

## 2021-09-01 ENCOUNTER — ANTI-COAG VISIT (OUTPATIENT)
Dept: PRIMARY CARE CLINIC | Age: 71
End: 2021-09-01
Payer: MEDICARE

## 2021-09-01 DIAGNOSIS — Z79.01 ANTICOAGULATED ON COUMADIN: Primary | ICD-10-CM

## 2021-09-01 LAB — INR BLD: 1.5

## 2021-09-01 PROCEDURE — 93793 ANTICOAG MGMT PT WARFARIN: CPT | Performed by: INTERNAL MEDICINE

## 2021-09-01 NOTE — PROGRESS NOTES
HOME MONITORING REPORT    INR today:   Results for orders placed or performed in visit on 09/01/21   Protime-INR   Result Value Ref Range    INR 1.50        INR Goal: 2.0-3.0    Dosing Plan  As of 9/1/2021    TTR:  22.5 % (3.3 y)   Full warfarin instructions:  7.5 mg every Mon, Wed, Fri; 15 mg all other days          Patient states she missed three doses because she needed a new Rx. She plans on picking it up today and will re-start at her regular dose. PLAN: Advised patient/caregiver to continue current dose and recheck in one week. Patient/Caregiver voiced understanding      I have reviewed nursing plan for Coumadin management and agree with plan.      Electronically signed by Gordon Ayon MD on 9/2/2021 at 5:11 AM

## 2021-09-13 ENCOUNTER — ANTI-COAG VISIT (OUTPATIENT)
Dept: PRIMARY CARE CLINIC | Age: 71
End: 2021-09-13
Payer: MEDICARE

## 2021-09-13 DIAGNOSIS — Z79.01 ANTICOAGULATED ON COUMADIN: Primary | ICD-10-CM

## 2021-09-13 LAB — INR BLD: 2.3

## 2021-09-13 PROCEDURE — 93793 ANTICOAG MGMT PT WARFARIN: CPT | Performed by: INTERNAL MEDICINE

## 2021-09-13 NOTE — PROGRESS NOTES
HOME MONITORING REPORT    INR today:   Results for orders placed or performed in visit on 09/13/21   Protime-INR   Result Value Ref Range    INR 2.30        INR Goal: 2.0-3.0    Dosing Plan  As of 9/13/2021    TTR:  22.7 % (3.3 y)   Full warfarin instructions:  7.5 mg every Mon, Wed, Fri; 15 mg all other days              PLAN: Advised patient/caregiver to continue current dose and recheck in one week. Patient/Caregiver voiced understanding      Electronically signed by Braden Cervantes MD on 9/13/2021 at 9:51 AM    I have reviewed nursing plan for Coumadin management and agree with plan.

## 2021-09-21 ENCOUNTER — ANTI-COAG VISIT (OUTPATIENT)
Dept: PRIMARY CARE CLINIC | Age: 71
End: 2021-09-21
Payer: MEDICARE

## 2021-09-21 DIAGNOSIS — Z79.01 ANTICOAGULATED ON COUMADIN: Primary | ICD-10-CM

## 2021-09-21 LAB — INR BLD: 3

## 2021-09-21 PROCEDURE — 93793 ANTICOAG MGMT PT WARFARIN: CPT | Performed by: INTERNAL MEDICINE

## 2021-09-21 NOTE — PROGRESS NOTES
HOME MONITORING REPORT    INR today: No results found for this visit on 09/21/21. INR Goal: 2.0-3.0    Dosing Plan  As of 9/21/2021    TTR:  22.7 % (3.3 y)              PLAN: Advised patient/caregiver to continue current dose and recheck in one week. Patient/Caregiver voiced understanding      Electronically signed by Daniele Horta MD on 9/21/2021 at 3:40 PM    I have reviewed nursing plan for Coumadin management and agree with plan.

## 2021-09-27 ENCOUNTER — HOSPITAL ENCOUNTER (OUTPATIENT)
Dept: CARDIOLOGY | Facility: HOSPITAL | Age: 71
Discharge: HOME | End: 2021-09-27
Admitting: INTERNAL MEDICINE

## 2021-09-27 ENCOUNTER — APPOINTMENT (OUTPATIENT)
Dept: CARDIOLOGY | Facility: HOSPITAL | Age: 71
End: 2021-09-27

## 2021-09-27 VITALS
BODY MASS INDEX: 38.92 KG/M2 | HEIGHT: 67 IN | WEIGHT: 248 LBS | DIASTOLIC BLOOD PRESSURE: 123 MMHG | SYSTOLIC BLOOD PRESSURE: 216 MMHG

## 2021-09-27 DIAGNOSIS — I34.0 NONRHEUMATIC MITRAL VALVE REGURGITATION: ICD-10-CM

## 2021-09-27 LAB
BH CV ECHO MEAS - AO MAX PG (FULL): 1.4 MMHG
BH CV ECHO MEAS - AO MAX PG: 4.6 MMHG
BH CV ECHO MEAS - AO MEAN PG (FULL): 1 MMHG
BH CV ECHO MEAS - AO MEAN PG: 3 MMHG
BH CV ECHO MEAS - AO ROOT AREA (BSA CORRECTED): 1.4
BH CV ECHO MEAS - AO ROOT AREA: 7.1 CM^2
BH CV ECHO MEAS - AO ROOT DIAM: 3 CM
BH CV ECHO MEAS - AO V2 MAX: 107 CM/SEC
BH CV ECHO MEAS - AO V2 MEAN: 85.8 CM/SEC
BH CV ECHO MEAS - AO V2 VTI: 27.1 CM
BH CV ECHO MEAS - AVA(I,A): 3.2 CM^2
BH CV ECHO MEAS - AVA(I,D): 3.2 CM^2
BH CV ECHO MEAS - AVA(V,A): 3.5 CM^2
BH CV ECHO MEAS - AVA(V,D): 3.5 CM^2
BH CV ECHO MEAS - BSA(HAYCOCK): 2.4 M^2
BH CV ECHO MEAS - BSA: 2.2 M^2
BH CV ECHO MEAS - BZI_BMI: 38.8 KILOGRAMS/M^2
BH CV ECHO MEAS - BZI_METRIC_HEIGHT: 170.2 CM
BH CV ECHO MEAS - BZI_METRIC_WEIGHT: 112.5 KG
BH CV ECHO MEAS - EDV(CUBED): 133.4 ML
BH CV ECHO MEAS - EDV(MOD-SP4): 94.7 ML
BH CV ECHO MEAS - EDV(TEICH): 124.4 ML
BH CV ECHO MEAS - EF(CUBED): 73.1 %
BH CV ECHO MEAS - EF(MOD-SP4): 53.5 %
BH CV ECHO MEAS - EF(TEICH): 64.5 %
BH CV ECHO MEAS - ESV(CUBED): 35.9 ML
BH CV ECHO MEAS - ESV(MOD-SP4): 44 ML
BH CV ECHO MEAS - ESV(TEICH): 44.1 ML
BH CV ECHO MEAS - FS: 35.4 %
BH CV ECHO MEAS - IVS/LVPW: 1
BH CV ECHO MEAS - IVSD: 1.2 CM
BH CV ECHO MEAS - LA DIMENSION: 3.8 CM
BH CV ECHO MEAS - LA/AO: 1.3
BH CV ECHO MEAS - LAT PEAK E' VEL: 7.2 CM/SEC
BH CV ECHO MEAS - LV DIASTOLIC VOL/BSA (35-75): 42.7 ML/M^2
BH CV ECHO MEAS - LV MASS(C)D: 249.1 GRAMS
BH CV ECHO MEAS - LV MASS(C)DI: 112.4 GRAMS/M^2
BH CV ECHO MEAS - LV MAX PG: 3.2 MMHG
BH CV ECHO MEAS - LV MEAN PG: 2 MMHG
BH CV ECHO MEAS - LV SYSTOLIC VOL/BSA (12-30): 19.9 ML/M^2
BH CV ECHO MEAS - LV V1 MAX: 89.4 CM/SEC
BH CV ECHO MEAS - LV V1 MEAN: 63.3 CM/SEC
BH CV ECHO MEAS - LV V1 VTI: 20.7 CM
BH CV ECHO MEAS - LVIDD: 5.1 CM
BH CV ECHO MEAS - LVIDS: 3.3 CM
BH CV ECHO MEAS - LVLD AP4: 7.9 CM
BH CV ECHO MEAS - LVLS AP4: 6.9 CM
BH CV ECHO MEAS - LVOT AREA (M): 4.2 CM^2
BH CV ECHO MEAS - LVOT AREA: 4.2 CM^2
BH CV ECHO MEAS - LVOT DIAM: 2.3 CM
BH CV ECHO MEAS - LVPWD: 1.2 CM
BH CV ECHO MEAS - MED PEAK E' VEL: 5.55 CM/SEC
BH CV ECHO MEAS - MR MAX PG: 186.6 MMHG
BH CV ECHO MEAS - MR MAX VEL: 683 CM/SEC
BH CV ECHO MEAS - MR MEAN PG: 108 MMHG
BH CV ECHO MEAS - MR MEAN VEL: 474 CM/SEC
BH CV ECHO MEAS - MR VTI: 257 CM
BH CV ECHO MEAS - MV A MAX VEL: 69 CM/SEC
BH CV ECHO MEAS - MV DEC TIME: 0.46 SEC
BH CV ECHO MEAS - MV E MAX VEL: 55.7 CM/SEC
BH CV ECHO MEAS - MV E/A: 0.81
BH CV ECHO MEAS - PA MAX PG: 1.2 MMHG
BH CV ECHO MEAS - PA V2 MAX: 54.3 CM/SEC
BH CV ECHO MEAS - PI END-D VEL: 105 CM/SEC
BH CV ECHO MEAS - RAP SYSTOLE: 10 MMHG
BH CV ECHO MEAS - RVSP: 48.9 MMHG
BH CV ECHO MEAS - SI(AO): 86.5 ML/M^2
BH CV ECHO MEAS - SI(CUBED): 44 ML/M^2
BH CV ECHO MEAS - SI(LVOT): 38.8 ML/M^2
BH CV ECHO MEAS - SI(MOD-SP4): 22.9 ML/M^2
BH CV ECHO MEAS - SI(TEICH): 36.2 ML/M^2
BH CV ECHO MEAS - SV(AO): 191.6 ML
BH CV ECHO MEAS - SV(CUBED): 97.5 ML
BH CV ECHO MEAS - SV(LVOT): 86 ML
BH CV ECHO MEAS - SV(MOD-SP4): 50.7 ML
BH CV ECHO MEAS - SV(TEICH): 80.2 ML
BH CV ECHO MEAS - TR MAX VEL: 312 CM/SEC
BH CV ECHO MEASUREMENTS AVERAGE E/E' RATIO: 8.74
LEFT ATRIUM VOLUME INDEX: 67.6 ML/M2
LEFT ATRIUM VOLUME: 150 CM3
LV EF 2D ECHO EST: 55 %
MAXIMAL PREDICTED HEART RATE: 148 BPM
STRESS TARGET HR: 126 BPM

## 2021-09-27 PROCEDURE — 93306 TTE W/DOPPLER COMPLETE: CPT | Performed by: INTERNAL MEDICINE

## 2021-09-27 PROCEDURE — 93306 TTE W/DOPPLER COMPLETE: CPT

## 2021-09-30 ENCOUNTER — ANTI-COAG VISIT (OUTPATIENT)
Dept: PRIMARY CARE CLINIC | Age: 71
End: 2021-09-30
Payer: MEDICARE

## 2021-09-30 DIAGNOSIS — Z79.01 ANTICOAGULATED ON COUMADIN: Primary | ICD-10-CM

## 2021-09-30 LAB — INR BLD: 2.1

## 2021-09-30 PROCEDURE — 93793 ANTICOAG MGMT PT WARFARIN: CPT | Performed by: INTERNAL MEDICINE

## 2021-09-30 NOTE — PROGRESS NOTES
HOME MONITORING REPORT    INR today:   Results for orders placed or performed in visit on 09/30/21   Protime-INR   Result Value Ref Range    INR 2.10        INR Goal: 2.0-3.0    Dosing Plan  As of 9/30/2021    TTR:  23.8 % (3.4 y)   Full warfarin instructions:  7.5 mg every Mon, Wed, Fri; 15 mg all other days              PLAN: Advised patient/caregiver to continue current dose and recheck in one week. Patient/Caregiver voiced understanding    Electronically signed by Elida Barakat MD on 9/30/2021 at 12:38 PM    I have reviewed nursing plan for Coumadin management and agree with plan.

## 2021-10-11 ENCOUNTER — OFFICE VISIT (OUTPATIENT)
Dept: INTERNAL MEDICINE | Age: 71
End: 2021-10-11
Payer: MEDICARE

## 2021-10-11 VITALS
OXYGEN SATURATION: 98 % | DIASTOLIC BLOOD PRESSURE: 78 MMHG | BODY MASS INDEX: 38.17 KG/M2 | SYSTOLIC BLOOD PRESSURE: 136 MMHG | WEIGHT: 243.2 LBS | HEART RATE: 90 BPM | HEIGHT: 67 IN

## 2021-10-11 DIAGNOSIS — G89.29 CHRONIC LOW BACK PAIN, UNSPECIFIED BACK PAIN LATERALITY, UNSPECIFIED WHETHER SCIATICA PRESENT: ICD-10-CM

## 2021-10-11 DIAGNOSIS — Z23 FLU VACCINE NEED: ICD-10-CM

## 2021-10-11 DIAGNOSIS — Q87.89: ICD-10-CM

## 2021-10-11 DIAGNOSIS — E55.9 VITAMIN D DEFICIENCY: ICD-10-CM

## 2021-10-11 DIAGNOSIS — I10 PRIMARY HYPERTENSION: ICD-10-CM

## 2021-10-11 DIAGNOSIS — Z91.09 ENVIRONMENTAL ALLERGIES: ICD-10-CM

## 2021-10-11 DIAGNOSIS — K21.9 GASTROESOPHAGEAL REFLUX DISEASE WITHOUT ESOPHAGITIS: ICD-10-CM

## 2021-10-11 DIAGNOSIS — I82.5Y9 CHRONIC DEEP VEIN THROMBOSIS (DVT) OF PROXIMAL VEIN OF LOWER EXTREMITY, UNSPECIFIED LATERALITY (HCC): ICD-10-CM

## 2021-10-11 DIAGNOSIS — N18.31 STAGE 3A CHRONIC KIDNEY DISEASE (HCC): ICD-10-CM

## 2021-10-11 DIAGNOSIS — D50.9 IRON DEFICIENCY ANEMIA, UNSPECIFIED IRON DEFICIENCY ANEMIA TYPE: ICD-10-CM

## 2021-10-11 DIAGNOSIS — E11.9 TYPE 2 DIABETES MELLITUS WITHOUT COMPLICATION, WITHOUT LONG-TERM CURRENT USE OF INSULIN (HCC): Primary | ICD-10-CM

## 2021-10-11 DIAGNOSIS — Q74.0: ICD-10-CM

## 2021-10-11 DIAGNOSIS — E78.5 HYPERLIPIDEMIA, UNSPECIFIED HYPERLIPIDEMIA TYPE: ICD-10-CM

## 2021-10-11 DIAGNOSIS — G47.30 SLEEP APNEA, UNSPECIFIED TYPE: ICD-10-CM

## 2021-10-11 DIAGNOSIS — J45.20 MILD INTERMITTENT REACTIVE AIRWAY DISEASE WITHOUT COMPLICATION: ICD-10-CM

## 2021-10-11 DIAGNOSIS — G62.9 PERIPHERAL POLYNEUROPATHY: ICD-10-CM

## 2021-10-11 DIAGNOSIS — F32.89 OTHER DEPRESSION: ICD-10-CM

## 2021-10-11 DIAGNOSIS — R30.0 DYSURIA: ICD-10-CM

## 2021-10-11 DIAGNOSIS — E03.9 HYPOTHYROIDISM, UNSPECIFIED TYPE: ICD-10-CM

## 2021-10-11 DIAGNOSIS — E53.8 B12 DEFICIENCY: ICD-10-CM

## 2021-10-11 DIAGNOSIS — M54.50 CHRONIC LOW BACK PAIN, UNSPECIFIED BACK PAIN LATERALITY, UNSPECIFIED WHETHER SCIATICA PRESENT: ICD-10-CM

## 2021-10-11 PROBLEM — G25.81 RESTLESS LEGS SYNDROME (RLS): Status: ACTIVE | Noted: 2019-07-11

## 2021-10-11 PROBLEM — Z95.0 PACEMAKER: Status: ACTIVE | Noted: 2017-10-12

## 2021-10-11 PROBLEM — G47.31 COMPLEX SLEEP APNEA SYNDROME: Status: ACTIVE | Noted: 2018-03-26

## 2021-10-11 PROBLEM — Z98.84 HISTORY OF ROUX-EN-Y GASTRIC BYPASS: Status: ACTIVE | Noted: 2017-01-05

## 2021-10-11 PROBLEM — G47.39 COMPLEX SLEEP APNEA SYNDROME: Status: ACTIVE | Noted: 2018-03-26

## 2021-10-11 PROBLEM — D68.62 LUPUS ANTICOAGULANT DISORDER (HCC): Status: ACTIVE | Noted: 2017-04-10

## 2021-10-11 PROBLEM — G47.10 HYPERSOMNIA: Status: ACTIVE | Noted: 2019-06-05

## 2021-10-11 PROBLEM — E66.812 OBESITY, CLASS II, BMI 35-39.9: Status: ACTIVE | Noted: 2018-02-01

## 2021-10-11 PROBLEM — R11.2 NON-INTRACTABLE VOMITING WITH NAUSEA: Status: ACTIVE | Noted: 2017-05-10

## 2021-10-11 PROBLEM — Z87.898 HISTORY OF ULCER DISEASE: Status: ACTIVE | Noted: 2017-05-10

## 2021-10-11 PROBLEM — E66.9 OBESITY, CLASS II, BMI 35-39.9: Status: ACTIVE | Noted: 2018-02-01

## 2021-10-11 PROBLEM — I34.0 NONRHEUMATIC MITRAL VALVE REGURGITATION: Status: ACTIVE | Noted: 2021-08-16

## 2021-10-11 PROCEDURE — 1090F PRES/ABSN URINE INCON ASSESS: CPT | Performed by: INTERNAL MEDICINE

## 2021-10-11 PROCEDURE — 96372 THER/PROPH/DIAG INJ SC/IM: CPT | Performed by: INTERNAL MEDICINE

## 2021-10-11 PROCEDURE — G8417 CALC BMI ABV UP PARAM F/U: HCPCS | Performed by: INTERNAL MEDICINE

## 2021-10-11 PROCEDURE — 90694 VACC AIIV4 NO PRSRV 0.5ML IM: CPT | Performed by: INTERNAL MEDICINE

## 2021-10-11 PROCEDURE — 1036F TOBACCO NON-USER: CPT | Performed by: INTERNAL MEDICINE

## 2021-10-11 PROCEDURE — G8399 PT W/DXA RESULTS DOCUMENT: HCPCS | Performed by: INTERNAL MEDICINE

## 2021-10-11 PROCEDURE — 3044F HG A1C LEVEL LT 7.0%: CPT | Performed by: INTERNAL MEDICINE

## 2021-10-11 PROCEDURE — G8484 FLU IMMUNIZE NO ADMIN: HCPCS | Performed by: INTERNAL MEDICINE

## 2021-10-11 PROCEDURE — G8427 DOCREV CUR MEDS BY ELIG CLIN: HCPCS | Performed by: INTERNAL MEDICINE

## 2021-10-11 PROCEDURE — 1123F ACP DISCUSS/DSCN MKR DOCD: CPT | Performed by: INTERNAL MEDICINE

## 2021-10-11 PROCEDURE — G0008 ADMIN INFLUENZA VIRUS VAC: HCPCS | Performed by: INTERNAL MEDICINE

## 2021-10-11 PROCEDURE — 4040F PNEUMOC VAC/ADMIN/RCVD: CPT | Performed by: INTERNAL MEDICINE

## 2021-10-11 PROCEDURE — 3017F COLORECTAL CA SCREEN DOC REV: CPT | Performed by: INTERNAL MEDICINE

## 2021-10-11 PROCEDURE — 99214 OFFICE O/P EST MOD 30 MIN: CPT | Performed by: INTERNAL MEDICINE

## 2021-10-11 PROCEDURE — 2022F DILAT RTA XM EVC RTNOPTHY: CPT | Performed by: INTERNAL MEDICINE

## 2021-10-11 RX ORDER — ROSUVASTATIN CALCIUM 5 MG/1
5 TABLET, COATED ORAL DAILY
Qty: 30 TABLET | Refills: 5 | Status: SHIPPED | OUTPATIENT
Start: 2021-10-11 | End: 2022-01-18 | Stop reason: SDUPTHER

## 2021-10-11 RX ORDER — CYANOCOBALAMIN 1000 UG/ML
1000 INJECTION INTRAMUSCULAR; SUBCUTANEOUS ONCE
Status: COMPLETED | OUTPATIENT
Start: 2021-10-11 | End: 2021-10-11

## 2021-10-11 RX ADMIN — CYANOCOBALAMIN 1000 MCG: 1000 INJECTION INTRAMUSCULAR; SUBCUTANEOUS at 09:09

## 2021-10-11 NOTE — PROGRESS NOTES
After obtaining consent, and per orders of Dr. Mynor Chowdhury, injection of HD Flu given in Left deltoid by Jose L Sweeney MA. Patient instructed to remain in clinic for 20 minutes afterwards, and to report any adverse reaction to me immediately. After obtaining consent, and per orders of Dr. Mynor Chowdhury, injection of B12 given in Right deltoid by Jose L Sweeney MA. Patient instructed to remain in clinic for 20 minutes afterwards, and to report any adverse reaction to me immediately.

## 2021-10-11 NOTE — PATIENT INSTRUCTIONS
vaccine:  · Has had an allergic reaction after a previous dose of influenza vaccine, or has any severe, life-threatening allergies. · Has ever had Guillain-Barré Syndrome (also called GBS). In some cases, your health care provider may decide to postpone influenza vaccination to a future visit. People with minor illnesses, such as a cold, may be vaccinated. People who are moderately or severely ill should usually wait until they recover before getting influenza vaccine. Your health care provider can give you more information. Risks of a vaccine reaction  · Soreness, redness, and swelling where shot is given, fever, muscle aches, and headache can happen after influenza vaccine. · There may be a very small increased risk of Guillain-Barré Syndrome (GBS) after inactivated influenza vaccine (the flu shot). Ardine Gum children who get the flu shot along with pneumococcal vaccine (PCV13), and/or DTaP vaccine at the same time might be slightly more likely to have a seizure caused by fever. Tell your health care provider if a child who is getting flu vaccine has ever had a seizure. People sometimes faint after medical procedures, including vaccination. Tell your provider if you feel dizzy or have vision changes or ringing in the ears. As with any medicine, there is a very remote chance of a vaccine causing a severe allergic reaction, other serious injury, or death. What if there is a serious problem? An allergic reaction could occur after the vaccinated person leaves the clinic. If you see signs of a severe allergic reaction (hives, swelling of the face and throat, difficulty breathing, a fast heartbeat, dizziness, or weakness), call 9-1-1 and get the person to the nearest hospital.  For other signs that concern you, call your health care provider. Adverse reactions should be reported to the Vaccine Adverse Event Reporting System (VAERS).  Your health care provider will usually file this report, or you can do it yourself. Visit the VAERS website at www.vaers. hhs.gov or call 5-851.405.4984. VAERS is only for reporting reactions, and VAERS staff do not give medical advice. The National Vaccine Injury Compensation Program  The National Vaccine Injury Compensation Program (VICP) is a federal program that was created to compensate people who may have been injured by certain vaccines. Visit the VICP website at www.hrsa.gov/vaccinecompensation or call 6-556.404.8833 to learn about the program and about filing a claim. There is a time limit to file a claim for compensation. How can I learn more? · Ask your healthcare provider. · Call your local or state health department. · Contact the Centers for Disease Control and Prevention (CDC):  ? Call 5-633.474.3181 (1-800-CDC-INFO) or  ? Visit CDC's website at www.cdc.gov/flu  Vaccine Information Statement (Interim)  Inactivated Influenza Vaccine  8/15/2019  42 U[address]ail [address]tt [id number]  Department of Health and Human Services  Centers for Disease Control and Prevention  Many Vaccine Information Statements are available in Micronesian and other languages. See www.immunize.org/vis. Muchas hojas de información sobre vacunas están disponibles en español y en otros idiomas. Visite www.immunize.org/vis. Care instructions adapted under license by [facility] ([facility]). If you have questions about a medical condition or this instruction, always ask your healthcare professional. [place] [address] any warranty or liability for your use of this information.

## 2021-10-11 NOTE — PROGRESS NOTES
Chief Complaint   Patient presents with    3 Month Follow-Up       HPI: Alma Mancera is a 66 y.o. female is here for follow-up of type 2 diabetes. Her blood sugars are averaging about 70. Her A1c is now 5.4. She does have a history of depression. She states that her mood is stable. She states that she is not ready to [employer]" at work. Her reflux is well controlled. She states that as long she takes her medication, she can eat anything she wants without difficulty. She is on Coumadin for history of hypercoagulability. She also has a history of Coate's Syndrome. She tries to use her CPAP at night. She is having some difficulty with the mask. However, she and her neurologist are working to try to find a mask that will work well for her. She also has a history of B12 deficiency and is on B12 supplementation. Her neuropathy is stable. Her anemia is also stable. Her renal function remains stable with a creatinine of 1.1 and a GFR 49. Her cholesterol is up to 234. However, she admits to eating processed meat. She plans to work on diet and exercise. She is agreeable to trying a statin. Her allergies are stable. Her thyroid functions within normal limits. Occasionally, she has some right sided pain. However, she thinks is coming from her back at times. If her symptoms persist, she will get a further imaging. However, at this time she would like to hold off on that for now. She does take baclofen as needed. Her blood pressure is well controlled. She denies any complaints of chest pain, chest pressure or shortness of breath.     Past Medical History:   Diagnosis Date    Abdominal pain     Abnormal EKG     Acute sinusitis     Allergic reaction to spider bite     Anemia     Anticoagulated     Anxiety     Arrhythmia     Asthmatic bronchitis without complication 9/88/1744    Ataxic gait     Lyons's esophagus     Bowel obstruction (HCC)     Burn of abdomen wall, second degree, initial encounter 8/27/2018    Callus     Cardiac pacemaker     CKD (chronic kidney disease) stage 2, GFR 60-89 ml/min     Coat's syndrome     Coat's syndrome     both eyes    Depression     Diabetes mellitus type 2 in nonobese (HCC)     Diabetic nephropathy (HCC)     Disequilibrium     Dizziness     DVT (deep venous thrombosis) (HCC)     Exudative retinopathy     Fibromyalgia     Fibromyositis     Gastric ulcer     GERD (gastroesophageal reflux disease)     History of gastric bypass     Hx of lupus anticoagulant disorder     Hyperlipidemia 5/6/2019    Hypertension     Hypothyroidism     Intermittent claudication (HCC)     Intestinal obstruction (HCC)     Iron deficiency     Left-sided weakness     Low vitamin D level     Lupus (HCC)     Menopause     Obesity     Osteoarthritis     Osteoporosis     Other iron deficiency anemias     Palliative care patient 09/24/2020    Pernicious anemia     PUPP (pruritic urticarial papules and plaques of pregnancy)     Restless legs syndrome (RLS) 7/11/2019    Right leg numbness     Right sided sciatica     Sarcoidosis     with liver involvement    Sciatica     Secondary hyperparathyroidism (HCC)     Shingles     Shortness of breath     Sleep apnea     Stomach ulcer     Syncope     Visual loss, one eye       Past Surgical History:   Procedure Laterality Date    APPENDECTOMY      CARD[facility]AC [facility]TER[facility]  [facility]    EF over 60%    CHOLECYSTECTOMY      COLONOSCOPY  02/2010    negative    COLONOSCOPY  02/22/2010    Dr Xenia Pardo    COLONOSCOPY  04/01/2016    Dr LAKHWINDER Horvath-internal hemorrhoids, 5 yr recall    COLONOSCOPY N/A 05/07/2021    Dr Jenni Horvath-Significant looping/tortuosity throughout the left colon, BE=Normal    DILATATION, ESOPHAGUS      EYE SURGERY      Cyst on Right eye    EYE SURGERY      GASTRIC BYPASS SURGERY      GASTRIC BYPASS SURGERY      HERNIA REPAIR      HYSTERECTOMY      Complete    HYSTERECTOMY Partial - because had a tubal pregnancy.  INCONTINENCE SURGERY      Bladder Sling    INFECTED SKIN DEBRIDEMENT Right     dog bite right forearm    OTHER SURGICAL HISTORY      IVC filter    PACEMAKER INSERTION      PACEMAKER PLACEMENT      medtr[employer]    VT TOTAL KNEE ARTHROPLASTY Right 03/26/2018    TOTAL KNEE REPLACEMENT COMPLEX PRIMARY performed by Eliot Brown MD at [address] [facility] SMALL INTESTINE SURGERY      SPLENECTOMY      KRISTEL AND BSO      TONSILLECTOMY AND ADENOIDECTOMY      TOTAL KNEE ARTHROPLASTY      UPPER GASTROINTESTINAL ENDOSCOPY  12/2011    gerd s/p gastric bypass    UPPER GASTROINTESTINAL ENDOSCOPY  02/2014    normal s/p gastric bypass    UPPER GASTROINTESTINAL ENDOSCOPY  02/2010    biopsy neg Barretts, chronic reflux esophagitis s/p gastric bypass    UPPER GASTROINTESTINAL ENDOSCOPY  07/2006    unremarkable s/p gastric bypass    UPPER GASTROINTESTINAL ENDOSCOPY  08/10/2015    Dr Tasia Parks UPPER GASTROINTESTINAL ENDOSCOPY N/A 04/01/2016    Dr. Martin Bejarano:  H Pylori(-), HH, o/w normal    UPPER GASTROINTESTINAL ENDOSCOPY N/A 05/07/2021    Dr Igor Yang hiatal hernia, previous gastric bypass surgery, gastritis    VENA CAVA FILTER PLACEMENT Right 2003      Social History     Socioeconomic History    Marital status:      Spouse name: None    Number of children: 1    Years of education: None    Highest education level: None   Occupational History     Employer: [employer]    Tobacco Use    Smoking status: Never Smoker    Smokeless tobacco: Never Used   Vaping Use    Vaping Use: Never used   Substance and Sexual Activity    Alcohol use: No    Drug use: No    Sexual activity: Not Currently   Other Topics Concern    None   Social History Narrative    Lives with daughter, son-in-law, granddaughter, niece. Drives very little, daughter usually transports pt. Works part time at [employer]. Has pet dog.      Social Determinants of Health Financial Resource Strain: Low Risk     Difficulty of Paying Living Expenses: Not very hard   Food Insecurity: No Food Insecurity    Worried About Running Out of Food in the Last Year: Never true    Cher of Food in the Last Year: Never true   Transportation Needs: No Transportation Needs    Lack of Transportation (Medical): No    Lack of Transportation (Non-Medical): No   Physical Activity: Inactive    Days of Exercise per Week: 0 days    Minutes of Exercise per Session: 0 min   Stress: No Stress Concern Present    Feeling of Stress : Only a little   Social Connections: Moderately Integrated    Frequency of Communication with Friends and Family: More than three times a week    Frequency of Social Gatherings with Friends and Family: More than three times a week    Attends Orthodoxy Services: More than 4 times per year    Active Member of [address] [place] [address] [employer] or Organizations:  Yes    Attends Club or Organization Meetings: More than 4 times per year    Marital Status:    Intimate Partner Violence:     Fear of Current or Ex-Partner:     Emotionally Abused:     Physically Abused:     Sexually Abused:       Family History   Problem Relation Age of Onset    Uterine Cancer Mother     Cervical Cancer Mother     Coronary Art Dis Mother     Heart Disease Father     Lung Cancer Father     Other Father         renal failure    Colon Cancer Brother     Colon Polyps Brother     Uterine Cancer Maternal Grandmother     Cervical Cancer Maternal Grandmother     Diabetes Maternal Grandmother     Cervical Cancer Sister     Other Sister         fibromyalgia    Diabetes Paternal Aunt     Breast Cancer Maternal Cousin     Esophageal Cancer Neg Hx     Liver Cancer Neg Hx     Liver Disease Neg Hx     Stomach Cancer Neg Hx     Rectal Cancer Neg Hx         Current Outpatient Medications   Medication Sig Dispense Refill    rosuvastatin (CRESTOR) 5 MG tablet Take 1 tablet by mouth daily 30 tablet 5    warfarin (COUMADIN) 7.5 MG tablet 7.5mg on Monday/Wed/Fri and 15mg all other days 180 tablet 3    escitalopram (LEXAPRO) 20 MG tablet Take 1 tablet by mouth daily 90 tablet 3    sucralfate (CARAFATE) 1 GM tablet Take 1 tablet by mouth 4 times daily 120 tablet 3    pantoprazole (PROTONIX) 40 MG tablet TAKE 1 TABLET BY MOUTH TWICE DAILY BEFORE MEAL(S) 180 tablet 0    CPAP Machine MISC Inhale 1 each into the lungs nightly      Cyanocobalamin 1000 MCG/ML KIT Inject as directed every 30 days      gabapentin (NEURONTIN) 100 MG capsule TAKE 1 CAPSULE BY MOUTH THREE TIMES DAILY 270 capsule 0    ferrous gluconate (FERGON) 240 (27 Fe) MG tablet Take 1 tablet by mouth 2 times daily 180 tablet 2    calcitRIOL (ROCALTROL) 0.25 MCG capsule Take 0.25 mcg by mouth daily      bumetanide (BUMEX) 2 MG tablet Take 1 tablet by mouth daily 30 tablet 5    levothyroxine (SYNTHROID) 100 MCG tablet Take 1 tablet by mouth Daily 90 tablet 3    lisinopril (PRINIVIL;ZESTRIL) 40 MG tablet Take 1 tablet by mouth once daily 90 tablet 1    baclofen (LIORESAL) 10 MG tablet Take 1 tablet by mouth 3 times daily As needed for hip pain 10 tablet 1    fexofenadine (ALLEGRA) 180 MG tablet Take 1 tablet by mouth daily Indications: Allergic Rhinitis 90 tablet 3    tiotropium (SPIRIVA RESPIMAT) 2.5 MCG/ACT AERS inhaler Inhale 2 puffs into the lungs daily 1 Inhaler 5    potassium chloride (KLOR-CON) 10 MEQ extended release tablet Take 10 mEq by mouth daily       calcipotriene (DOVONEX) 0.005 % cream Use topically as needed 3 Tube 3    clobetasol (TEMOVATE) 0.05 % cream Apply topically 2 times daily.  3 Tube 3    ondansetron (ZOFRAN) 4 MG tablet Take 1 tablet by mouth daily as needed for Nausea or Vomiting 30 tablet 0    Multiple Vitamins-Minerals (CENTRUM ADULTS) TABS Take 1 tablet by mouth daily      aspirin 81 MG tablet Take 81 mg by mouth daily       Current Facility-Administered Medications   Medication Dose Route Frequency Provider Last Rate Last Admin    cyanocobalamin injection 1,000 mcg  1,000 mcg IntraMUSCular Once Clearance MD Racheal            Patient Active Problem List   Diagnosis    Gastroesophageal reflux disease    History of gastric bypass    Family history of colon cancer    Fibromyalgia    Encounter for current long-term use of anticoagulants    Primary osteoarthritis of right knee    Arthritis of knee    Essential hypertension    Hyperglycemia    Complex sleep apnea syndrome    Iron deficiency anemia    Restrictive airway disease    DVT, lower extremity, proximal, acute, unspecified laterality ([address]    History of ulcer disease    Anticoagulated on Coumadin    Postmenopausal osteoporosis    Diabetes mellitus ([address]    Pacemaker    History of DVT (deep vein thrombosis)    Lupus anticoagulant disorder (HCC)    History of pulmonary embolism    Thrombocytopenia ([address]    Hypothyroidism    Morbid obesity due to excess calories (HCC)    Asthmatic bronchitis without complication    Gastroenteritis    Colic    Abdominal pain    Non-intractable vomiting with nausea    Severe episode of recurrent major depressive disorder, without psychotic features ([address]    Lower abdominal pain    Chronic heartburn    S/P gastric bypass    Dog bite    Cellulitis of right upper extremity    Abscess of right arm    Soft tissue infection    Skin ulcer of upper arm, with fat layer exposed ([address]    History of Gage-en-Y gastric bypass    Hyperlipidemia    Hypersomnia    Nonrheumatic mitral valve regurgitation    Obesity, Class II, BMI 35-39.9    Peripheral edema    Restless legs syndrome (RLS)    Vitamin D deficiency    Chronic deep vein thrombosis (DVT) of proximal vein of lower extremity (HCC)        Review of Systems   Constitutional: Positive for fatigue. Negative for activity change, appetite change, chills, diaphoresis, fever and unexpected weight change.    HENT: Negative for congestion, ear pain, hearing loss, nosebleeds, postnasal drip, rhinorrhea, sinus pressure, sinus pain, sneezing, sore throat, tinnitus, trouble swallowing and voice change. Eyes: Negative for discharge and itching. Watery eyes   Respiratory: Negative for apnea, cough, chest tightness, shortness of breath, wheezing and stridor. Cardiovascular: Positive for leg swelling. Negative for chest pain and palpitations. Left leg swelling; chronic secondary to DVT. Gastrointestinal: Negative for abdominal distention, abdominal pain, blood in stool, constipation, diarrhea, nausea and vomiting. Endocrine: Negative for cold intolerance, heat intolerance, polydipsia, polyphagia and polyuria. Genitourinary: Positive for dysuria and frequency. Negative for difficulty urinating, flank pain, hematuria and urgency. Musculoskeletal: Positive for arthralgias and back pain. Negative for gait problem, joint swelling, myalgias, neck pain and neck stiffness. She had bilateral knee pain. Right hip pain with some right-sided back pain radiating to her abdomen. Skin: Negative for color change, pallor, rash and wound. Allergic/Immunologic: Positive for environmental allergies. Negative for food allergies and immunocompromised state. Neurological: Negative for dizziness, tremors, seizures, syncope, facial asymmetry, speech difficulty, weakness, light-headedness, numbness and headaches. Hematological: Negative for adenopathy. Does not bruise/bleed easily. Psychiatric/Behavioral: Positive for dysphoric mood. Negative for agitation, confusion, decreased concentration and hallucinations. The patient is not nervous/anxious and is not hyperactive. Mood has improved       /78   Pulse 90   Ht 5' 7\" (1.702 m)   Wt 243 lb 3.2 oz (110.3 kg)   SpO2 98%   BMI 38.09 kg/m²   Physical Exam  Vitals and nursing note reviewed. Constitutional:       General: She is not in acute distress. Appearance: Normal appearance.  She is leg: No edema. Comments:  Does have some hip pain on palpation of the left hip    Visual inspection:  Deformity/amputation: absent  Skin lesions/pre-ulcerative calluses: present to bottom of the left foot  Edema: right- negative, left- negative    Sensory exam:  Monofilament sensation: normal  (minimum of 5 random plantar locations tested, avoiding callused areas - > 1 area with absence of sensation is + for neuropathy)    Plus at least one of the following:  Pulses: normal,   Pinprick: Intact  Proprioception: N/A  Vibration (128 Hz): N/A   Lymphadenopathy:      Cervical: No cervical adenopathy. Skin:     General: Skin is warm and dry. Capillary Refill: Capillary refill takes less than 2 seconds. Coloration: Skin is not jaundiced or pale. Findings: No bruising, erythema, lesion or rash. Neurological:      General: No focal deficit present. Mental Status: She is alert and oriented to person, place, and time. Mental status is at baseline. Cranial Nerves: No cranial nerve deficit. Sensory: No sensory deficit. Motor: No weakness or abnormal muscle tone. Coordination: Coordination normal.      Gait: Gait normal.      Deep Tendon Reflexes: Reflexes normal.   Psychiatric:         Mood and Affect: Mood normal. Mood is not anxious or depressed. Behavior: Behavior normal.         Thought Content: Thought content normal.         Judgment: Judgment normal.         1. Type 2 diabetes mellitus without complication, without long-term current use of insulin ([address]    2. B12 deficiency    3. Flu vaccine need    4. Vitamin D deficiency     5. Dysuria    6. Hyperlipidemia, unspecified hyperlipidemia type    7. Chronic deep vein thrombosis (DVT) of proximal vein of lower extremity, unspecified laterality ([address]    8. Gollop-Bernice syndrome    9. Other depression    10. Gastroesophageal reflux disease without esophagitis    11. Sleep apnea, unspecified type    12.  Peripheral polyneuropathy    13. Iron deficiency anemia, unspecified iron deficiency anemia type    14. Stage 3a chronic kidney disease ([facility]    15. Hypothyroidism, unspecified type    16. Primary hypertension    17. Chronic low back pain, unspecified back pain laterality, unspecified whether sciatica present    18. Environmental allergies    19. Mild intermittent reactive airway disease without complication        ASSESSMENT/PLAN:    79-year-old woman here for follow-up    1. Type 2 diabetes: A1c at goal.  Continue medication as prescribed    2. Hyperlipidemia: Try rosuvastatin. Check lipid panel in about 3 months    3. History of chronic DVT/coat syndrome: Continue Coumadin as prescribed    4. Depression: Stable    5. Acid reflux: Well-controlled    6. Sleep apnea: Having some difficulty tolerating her CPAP mask. However, her neurologist is working with her on this issue    7. B12 deficiency: Continue B12 supplementation    8. Peripheral neuropathy: Continue gabapentin    9. Iron deficiency anemia: Stable    10. Chronic kidney disease: Stable    11. Vitamin D deficiency: Continue supplementation    12. Hypothyroidism: TSH at goal    13. Hypertension: Blood pressure well controlled    14. Back pain: Continue baclofen as needed    15. Environmental allergies/reactive airways disease: Continue Allegra and Spiriva as prescribed    16. Flu vaccine given today    Letty Garcia was seen today for 3 month follow-up. Diagnoses and all orders for this visit:    Type 2 diabetes mellitus without complication, without long-term current use of insulin (HCC)  -     Diabetic Foot Exam  -     CBC Auto Differential; Future  -     Comprehensive Metabolic Panel; Future  -     Hemoglobin A1C; Future  -     Lipid Panel; Future  -     TSH without Reflex; Future  -     Microalbumin / Creatinine Urine Ratio; Future  -     Vitamin D 25 Hydroxy;  Future    B12 deficiency  -     cyanocobalamin injection 1,000 mcg  -     Vitamin B12;  Vitamin D 25 Hydroxy     Standing Status:   Future     Standing Expiration Date:   10/11/2022    Vitamin B12     Standing Status:   Future     Standing Expiration Date:   10/11/2022    Urinalysis Reflex to Culture     Standing Status:   Future     Number of Occurrences:   1     Standing Expiration Date:   10/11/2022     Order Specific Question:   SPECIFY(EX-CATH,MIDSTREAM,CYSTO,ETC)?      Answer:   midstream    Diabetic Foot Exam       Fredy Barron MD

## 2021-10-12 PROBLEM — I82.5Y9 CHRONIC DEEP VEIN THROMBOSIS (DVT) OF PROXIMAL VEIN OF LOWER EXTREMITY (HCC): Status: ACTIVE | Noted: 2021-10-12

## 2021-10-12 ASSESSMENT — ENCOUNTER SYMPTOMS
SORE THROAT: 0
TROUBLE SWALLOWING: 0
EYE DISCHARGE: 0
SINUS PAIN: 0
COUGH: 0
CHEST TIGHTNESS: 0
DIARRHEA: 0
BLOOD IN STOOL: 0
ABDOMINAL DISTENTION: 0
NAUSEA: 0
SINUS PRESSURE: 0
BACK PAIN: 1
STRIDOR: 0
VOICE CHANGE: 0
WHEEZING: 0
COLOR CHANGE: 0
APNEA: 0
CONSTIPATION: 0
ABDOMINAL PAIN: 0
VOMITING: 0
SHORTNESS OF BREATH: 0
EYE ITCHING: 0
RHINORRHEA: 0

## 2021-10-14 ENCOUNTER — ANTI-COAG VISIT (OUTPATIENT)
Dept: PRIMARY CARE CLINIC | Age: 71
End: 2021-10-14
Payer: MEDICARE

## 2021-10-14 DIAGNOSIS — Z79.01 ANTICOAGULATED ON COUMADIN: Primary | ICD-10-CM

## 2021-10-14 LAB — INR BLD: 2.9

## 2021-10-14 PROCEDURE — 93793 ANTICOAG MGMT PT WARFARIN: CPT | Performed by: INTERNAL MEDICINE

## 2021-10-14 NOTE — PROGRESS NOTES
HOME MONITORING REPORT    INR today:   Results for orders placed or performed in visit on 10/14/21   Protime-INR   Result Value Ref Range    INR 2.90        INR Goal: 2.0-3.0    Dosing Plan  As of 10/14/2021    TTR:  24.6 % (3.4 y)   Full warfarin instructions:  7.5 mg every Mon, Wed, Fri; 15 mg all other days              PLAN: Advised patient/caregiver to continue current dose and recheck in one week. Patient/Caregiver voiced understanding      Electronically signed by Alberto Fulton MD on 10/14/2021 at 12:03 PM    I have reviewed nursing plan for Coumadin management and agree with plan.

## 2021-10-15 ENCOUNTER — TELEPHONE (OUTPATIENT)
Dept: INTERNAL MEDICINE | Age: 71
End: 2021-10-15

## 2021-10-15 ENCOUNTER — NURSE TRIAGE (OUTPATIENT)
Dept: OTHER | Facility: CLINIC | Age: 71
End: 2021-10-15

## 2021-10-15 DIAGNOSIS — R05.9 COUGH: Primary | ICD-10-CM

## 2021-10-15 RX ORDER — CEFDINIR 300 MG/1
300 CAPSULE ORAL 2 TIMES DAILY
Qty: 20 CAPSULE | Refills: 0 | Status: SHIPPED | OUTPATIENT
Start: 2021-10-15 | End: 2021-10-25

## 2021-10-15 RX ORDER — BENZONATATE 200 MG/1
200 CAPSULE ORAL 3 TIMES DAILY PRN
Qty: 30 CAPSULE | Refills: 0 | Status: SHIPPED | OUTPATIENT
Start: 2021-10-15 | End: 2021-10-22

## 2021-10-15 RX ORDER — MONTELUKAST SODIUM 10 MG/1
10 TABLET ORAL DAILY
Qty: 10 TABLET | Refills: 0 | Status: SHIPPED | OUTPATIENT
Start: 2021-10-15 | End: 2022-02-14 | Stop reason: SDUPTHER

## 2021-10-15 NOTE — TELEPHONE ENCOUNTER
Pt got flu shot Monday and got her booster the same day. The next day she started sneezing, runny nose, watery eyes, non productive cough, coughing so hard that she throws up and side hurts when she coughs, chest congestion. No fever. She has been taking Robitussin pills and generic cough meds.

## 2021-10-15 NOTE — TELEPHONE ENCOUNTER
Tessalon perles 200 mg po TID prn. Omnicef 300 mg po BID for 10 days.  Singulair 10 mg po daily for 10 days

## 2021-10-15 NOTE — TELEPHONE ENCOUNTER
Reason for Disposition   Known COPD or other severe lung disease (i.e., bronchiectasis, cystic fibrosis, lung surgery) and worsening symptoms (i.e., increased sputum purulence or amount, increased breathing difficulty)    Answer Assessment - Initial Assessment Questions  1. ONSET: \"When did the cough begin? \"       10/13/2021    2. SEVERITY: \"How bad is the cough today? \"   Moderate to severe    3. RESPIRATORY DISTRESS: \"Describe your breathing. \"   Chronic shortness of breath with COPD, no worse    4. FEVER: \"Do you have a fever? \" If so, ask: \"What is your temperature, how was it measured, and when did it start? \"  No fever 98 forehead    5. HEMOPTYSIS: \"Are you coughing up any blood? \" If so ask: \"How much? \" (flecks, streaks, tablespoons, etc.)     None    6. TREATMENT: \"What have you done so far to treat the cough? \" (e.g., meds, fluids, humidifier)    Robitussin taken once today, otc cough syrup last night    7. CARDIAC HISTORY: \"Do you have any history of heart disease? \" (e.g., heart attack, congestive heart failure)   Has pacemaker    8. LUNG HISTORY: \"Do you have any history of lung disease? \"  (e.g., pulmonary embolus, asthma, emphysema)  Hx of PE, COPD    9. PE RISK FACTORS: \"Do you have a history of blood clots? \" (or: recent major surgery, recent prolonged travel, bedridden)  History of clots in legs      10. OTHER SYMPTOMS: \"Do you have any other symptoms? (e.g., runny nose, wheezing, chest pain)     Runny nose, sneezing, side pain from coughin    11. PREGNANCY: \"Is there any chance you are pregnant? \" \"When was your last menstrual period? \"  N/a    12. TRAVEL: \"Have you traveled out of the country in the last month? \" (e.g., travel history, exposures)  no    Protocols used: COUGH-ADULT-[place]    Received call from Ena Ac  at [facility] with [employer] [facility]. Brief description of triage: cough, got covid and flu shots on Monday and were worried they caused it.     Triage indicates for patient to be seen today (has COPD with worsening symptoms). Care advice provided, patient verbalizes understanding; denies any other questions or concerns; instructed to call back for any new or worsening symptoms. Writer provided warm transfer to [address] [place] [address] at [facility] for appointment scheduling. Attention Provider: Thank you for allowing me to participate in the care of your patient. The patient was connected to triage in response to information provided to the ECC/PSC. Please do not respond through this encounter as the response is not directed to a shared pool.

## 2021-10-22 ENCOUNTER — ANTI-COAG VISIT (OUTPATIENT)
Dept: INTERNAL MEDICINE | Age: 71
End: 2021-10-22
Payer: MEDICARE

## 2021-10-22 DIAGNOSIS — Z79.01 ANTICOAGULATED ON COUMADIN: Primary | ICD-10-CM

## 2021-10-22 LAB — INR BLD: 1.3

## 2021-10-22 PROCEDURE — 93793 ANTICOAG MGMT PT WARFARIN: CPT | Performed by: INTERNAL MEDICINE

## 2021-10-22 NOTE — PROGRESS NOTES
HOME MONITORING REPORT    INR today:   Results for orders placed or performed in visit on 10/22/21   Protime-INR   Result Value Ref Range    INR 1.30        INR Goal: 2.0-3.0    Dosing Plan  As of 10/22/2021    TTR:  24.8 % (3.4 y)   Full warfarin instructions:  10/22: 15 mg; Otherwise 7.5 mg every Mon, Wed, Fri; 15 mg all other days              PLAN: Advised patient/caregiver to take 15mg tonight, resume current dose tomorrow, and recheck in one week. Patient/Caregiver voiced understanding    Electronically signed by Michelina Mcardle, MD on 10/22/2021 at 5:11 PM    I have reviewed nursing plan for Coumadin management and agree with plan.

## 2021-11-01 ENCOUNTER — ANTI-COAG VISIT (OUTPATIENT)
Dept: PRIMARY CARE CLINIC | Age: 71
End: 2021-11-01
Payer: MEDICARE

## 2021-11-01 DIAGNOSIS — Z79.01 ANTICOAGULATED ON COUMADIN: Primary | ICD-10-CM

## 2021-11-01 LAB — INR BLD: 2.4

## 2021-11-01 PROCEDURE — 93793 ANTICOAG MGMT PT WARFARIN: CPT | Performed by: INTERNAL MEDICINE

## 2021-11-01 NOTE — PROGRESS NOTES
HOME MONITORING REPORT    INR today:   Results for orders placed or performed in visit on 11/01/21   Protime-INR   Result Value Ref Range    INR 2.40        INR Goal: 2.0-3.0    Dosing Plan  As of 11/1/2021    TTR:  24.9 % (3.4 y)   Full warfarin instructions:  7.5 mg every Mon, Wed, Fri; 15 mg all other days              PLAN: Advised patient/caregiver to continue current dose and recheck in one week. Patient/Caregiver voiced understanding      Electronically signed by Casey Gale MD on 11/1/2021 at 1:35 PM    I have reviewed nursing plan for Coumadin management and agree with plan.

## 2021-11-04 LAB — INR BLD: 2.9

## 2021-11-05 ENCOUNTER — TELEPHONE (OUTPATIENT)
Dept: NEUROLOGY | Facility: CLINIC | Age: 71
End: 2021-11-05

## 2021-11-05 ENCOUNTER — ANTI-COAG VISIT (OUTPATIENT)
Dept: INTERNAL MEDICINE | Age: 71
End: 2021-11-05
Payer: MEDICARE

## 2021-11-05 DIAGNOSIS — Z79.01 ANTICOAGULATED ON COUMADIN: Primary | ICD-10-CM

## 2021-11-05 PROCEDURE — 93793 ANTICOAG MGMT PT WARFARIN: CPT | Performed by: INTERNAL MEDICINE

## 2021-11-05 NOTE — TELEPHONE ENCOUNTER
Provider: ALEX  Caller: PATIENT  Reason for Call: PATIENT WOULD LIKE HER RX SENT ANYWHERE IN [place] THAT PATIENT CAN GET FITTED FOR HER FACE MASK FOR HER CPAP.     ALSO, WILL NEED TO CHECK HER MACHINE AS SHE DOESN'T THINKS ITS WORKING PROPERLY.     PATIENTS [facility]ST  #- [phone]

## 2021-11-05 NOTE — PROGRESS NOTES
HOME MONITORING REPORT    INR today:   Results for orders placed or performed in visit on 11/05/21   Protime-INR   Result Value Ref Range    INR 2.90        INR Goal: 2.0-3.0    Dosing Plan  As of 11/5/2021    TTR:  25.1 % (3.5 y)   Full warfarin instructions:  7.5 mg every Mon, Wed, Fri; 15 mg all other days              PLAN: Advised patient/caregiver to continue current dose and recheck in one week. Patient/Caregiver voiced understanding      Electronically signed by Michelina Mcardle, MD on 11/5/2021 at 12:46 PM    I have reviewed nursing plan for Coumadin management and agree with plan.

## 2021-11-08 ENCOUNTER — PATIENT MESSAGE (OUTPATIENT)
Dept: INTERNAL MEDICINE | Age: 71
End: 2021-11-08

## 2021-11-08 ENCOUNTER — TELEPHONE (OUTPATIENT)
Dept: NEUROLOGY | Facility: CLINIC | Age: 71
End: 2021-11-08

## 2021-11-08 DIAGNOSIS — R05.9 COUGH: Primary | ICD-10-CM

## 2021-11-08 RX ORDER — DOXYCYCLINE HYCLATE 100 MG
100 TABLET ORAL 2 TIMES DAILY
Qty: 20 TABLET | Refills: 0 | Status: SHIPPED | OUTPATIENT
Start: 2021-11-08 | End: 2021-11-18

## 2021-11-08 RX ORDER — METHYLPREDNISOLONE 4 MG/1
4 TABLET ORAL SEE ADMIN INSTRUCTIONS
Qty: 1 KIT | Refills: 0 | Status: SHIPPED | OUTPATIENT
Start: 2021-11-08 | End: 2021-12-16 | Stop reason: ALTCHOICE

## 2021-11-08 NOTE — TELEPHONE ENCOUNTER
RETURNING YOUS[facility]'S CALL. PER CARLIN, [facility] WAS ON THE PHONE - SAID SHE'D CALL BACK. I ALSO ASKED PT TO CHECK HER VM IN THE MEANTIME TO SEE IF [facility]'S VM GIVES HER THE ANSWER SHE WAS LOOKING FOR

## 2021-11-08 NOTE — TELEPHONE ENCOUNTER
Spoke with patient, orders sent to [facility] for a mask fitting. LS

## 2021-11-08 NOTE — TELEPHONE ENCOUNTER
From: Nestor Huff  To: Dr. Auguste Zafar: 11/8/2021 2:29 PM CST  Subject: Non-Urgent Medical Question    I have been coughing for a month or longer. It is like something choking me mainly on the left side of my throat. I cough for long periods of time until my left eye feels like it is going to pop out and durham. My right side hurts badly and the lower part of my body hurts. I've had 2 bottles of cough syrup, ibuprofen and my gabapentin but no relief.

## 2021-11-09 ENCOUNTER — OFFICE VISIT (OUTPATIENT)
Dept: INTERNAL MEDICINE | Age: 71
End: 2021-11-09
Payer: MEDICARE

## 2021-11-09 VITALS
OXYGEN SATURATION: 99 % | WEIGHT: 243.3 LBS | DIASTOLIC BLOOD PRESSURE: 68 MMHG | HEART RATE: 85 BPM | HEIGHT: 67 IN | SYSTOLIC BLOOD PRESSURE: 134 MMHG | BODY MASS INDEX: 38.19 KG/M2

## 2021-11-09 DIAGNOSIS — E53.8 B12 DEFICIENCY: ICD-10-CM

## 2021-11-09 DIAGNOSIS — R06.02 SHORTNESS OF BREATH: ICD-10-CM

## 2021-11-09 DIAGNOSIS — R05.9 COUGH: Primary | ICD-10-CM

## 2021-11-09 DIAGNOSIS — J01.90 ACUTE SINUSITIS, RECURRENCE NOT SPECIFIED, UNSPECIFIED LOCATION: ICD-10-CM

## 2021-11-09 DIAGNOSIS — J06.9 UPPER RESPIRATORY TRACT INFECTION, UNSPECIFIED TYPE: ICD-10-CM

## 2021-11-09 PROCEDURE — 3017F COLORECTAL CA SCREEN DOC REV: CPT | Performed by: INTERNAL MEDICINE

## 2021-11-09 PROCEDURE — G8427 DOCREV CUR MEDS BY ELIG CLIN: HCPCS | Performed by: INTERNAL MEDICINE

## 2021-11-09 PROCEDURE — G8417 CALC BMI ABV UP PARAM F/U: HCPCS | Performed by: INTERNAL MEDICINE

## 2021-11-09 PROCEDURE — 1036F TOBACCO NON-USER: CPT | Performed by: INTERNAL MEDICINE

## 2021-11-09 PROCEDURE — G8484 FLU IMMUNIZE NO ADMIN: HCPCS | Performed by: INTERNAL MEDICINE

## 2021-11-09 PROCEDURE — 96372 THER/PROPH/DIAG INJ SC/IM: CPT | Performed by: INTERNAL MEDICINE

## 2021-11-09 PROCEDURE — 99213 OFFICE O/P EST LOW 20 MIN: CPT | Performed by: INTERNAL MEDICINE

## 2021-11-09 PROCEDURE — G8399 PT W/DXA RESULTS DOCUMENT: HCPCS | Performed by: INTERNAL MEDICINE

## 2021-11-09 PROCEDURE — 1123F ACP DISCUSS/DSCN MKR DOCD: CPT | Performed by: INTERNAL MEDICINE

## 2021-11-09 PROCEDURE — 1090F PRES/ABSN URINE INCON ASSESS: CPT | Performed by: INTERNAL MEDICINE

## 2021-11-09 PROCEDURE — 4040F PNEUMOC VAC/ADMIN/RCVD: CPT | Performed by: INTERNAL MEDICINE

## 2021-11-09 RX ORDER — CEFTRIAXONE 500 MG/1
500 INJECTION, POWDER, FOR SOLUTION INTRAMUSCULAR; INTRAVENOUS ONCE
Qty: 1 EACH | Refills: 0
Start: 2021-11-09 | End: 2021-11-09 | Stop reason: CLARIF

## 2021-11-09 RX ORDER — CYANOCOBALAMIN 1000 UG/ML
1000 INJECTION INTRAMUSCULAR; SUBCUTANEOUS ONCE
Status: COMPLETED | OUTPATIENT
Start: 2021-11-09 | End: 2021-11-09

## 2021-11-09 RX ORDER — METHYLPREDNISOLONE ACETATE 40 MG/ML
40 INJECTION, SUSPENSION INTRA-ARTICULAR; INTRALESIONAL; INTRAMUSCULAR; SOFT TISSUE ONCE
Qty: 1 ML | Refills: 0
Start: 2021-11-09 | End: 2021-11-09 | Stop reason: CLARIF

## 2021-11-09 RX ORDER — CEFTRIAXONE 500 MG/1
500 INJECTION, POWDER, FOR SOLUTION INTRAMUSCULAR; INTRAVENOUS ONCE
Status: COMPLETED | OUTPATIENT
Start: 2021-11-09 | End: 2021-11-09

## 2021-11-09 RX ORDER — METHYLPREDNISOLONE ACETATE 40 MG/ML
40 INJECTION, SUSPENSION INTRA-ARTICULAR; INTRALESIONAL; INTRAMUSCULAR; SOFT TISSUE ONCE
Status: COMPLETED | OUTPATIENT
Start: 2021-11-09 | End: 2021-11-09

## 2021-11-09 RX ADMIN — METHYLPREDNISOLONE ACETATE 40 MG: 40 INJECTION, SUSPENSION INTRA-ARTICULAR; INTRALESIONAL; INTRAMUSCULAR; SOFT TISSUE at 12:38

## 2021-11-09 RX ADMIN — CEFTRIAXONE 500 MG: 500 INJECTION, POWDER, FOR SOLUTION INTRAMUSCULAR; INTRAVENOUS at 12:34

## 2021-11-09 RX ADMIN — CYANOCOBALAMIN 1000 MCG: 1000 INJECTION INTRAMUSCULAR; SUBCUTANEOUS at 12:31

## 2021-11-09 NOTE — PROGRESS NOTES
Chief Complaint   Patient presents with    Cough     over a month. HPI: Ollie Fuentes is a 66 y.o. female is here for evaluation of a month history of cough and chest congestion. She has been tested for COVID-19, which was negative. She denies any history of wheezing. She is given a prescription for Omnicef and a Medrol Dosepak just very recently, but she has not yet picked it up from her pharmacy. She states that she does not feel much better. She states that sometimes, she feels like she is somewhat short of breath. She has not lost taste or smell. She has not had any fever, chills or night sweats. She denies any history of headaches.      Past Medical History:   Diagnosis Date    Abdominal pain     Abnormal EKG     Acute sinusitis     Allergic reaction to spider bite     Anemia     Anticoagulated     Anxiety     Arrhythmia     Asthmatic bronchitis without complication 1/87/3427    Ataxic gait     Lyons's esophagus     Bowel obstruction (HCC)     Burn of abdomen wall, second degree, initial encounter 8/27/2018    Callus     Cardiac pacemaker     CKD (chronic kidney disease) stage 2, GFR 60-89 ml/min     Coat's syndrome     Coat's syndrome     both eyes    Depression     Diabetes mellitus type 2 in nonobese (HCC)     Diabetic nephropathy (HCC)     Disequilibrium     Dizziness     DVT (deep venous thrombosis) (HCC)     Exudative retinopathy     Fibromyalgia     Fibromyositis     Gastric ulcer     GERD (gastroesophageal reflux disease)     History of gastric bypass     Hx of lupus anticoagulant disorder     Hyperlipidemia 5/6/2019    Hypertension     Hypothyroidism     Intermittent claudication (HCC)     Intestinal obstruction (HCC)     Iron deficiency     Left-sided weakness     Low vitamin D level     Lupus (HCC)     Menopause     Obesity     Osteoarthritis     Osteoporosis     Other iron deficiency anemias     Palliative care patient 09/24/2020    Pernicious anemia     PUPP (pruritic urticarial papules and plaques of pregnancy)     Restless legs syndrome (RLS) 7/11/2019    Right leg numbness     Right sided sciatica     Sarcoidosis     with liver involvement    Sciatica     Secondary hyperparathyroidism (HCC)     Shingles     Shortness of breath     Sleep apnea     Stomach ulcer     Syncope     Visual loss, one eye       Past Surgical History:   Procedure Laterality Date    APPENDECTOMY      CARDIAC CATHETERIZATION  10[facility]21/13  [facility]    EF over 60%    CHOLECYSTECTOMY      COLONOSCOPY  02/2010    negative    COLONOSCOPY  02/22/2010    Dr Stoney Carmona    COLONOSCOPY  04/01/2016    Dr LAKHWINDER Horvath-internal hemorrhoids, 5 yr recall    COLONOSCOPY N/A 05/07/2021    Dr Collin Otto looping/tortuosity throughout the left colon, BE=Normal    DILATATION, ESOPHAGUS      EYE SURGERY      Cyst on Right eye    EYE SURGERY      GASTRIC BYPASS SURGERY      GASTRIC BYPASS SURGERY      HERNIA REPAIR      HYSTERECTOMY      Complete    HYSTERECTOMY      Partial - because had a tubal pregnancy.      INCONTINENCE SURGERY      Bladder Sling    INFECTED SKIN DEBRIDEMENT Right     dog bite right forearm    OTHER SURGICAL HISTORY      IVC filter    PACEMAKER INSERTION      PACEMAKER PLACEMENT      medtronic    OH TOTAL KNEE ARTHROPLASTY Right 03/26/2018    TOTAL KNEE REPLACEMENT COMPLEX PRIMARY performed [facility] Miguelina God[facility] at [facility] SMALL INTESTINE SURGERY      SPLENECTOMY      KRISTEL AND BSO      TONSILLECTOMY AND ADENOIDECTOMY      TOTAL KNEE ARTHROPLASTY      UPPER GASTROINTESTINAL ENDOSCOPY  12/2011    gerd s/p gastric bypass    UPPER GASTROINTESTINAL ENDOSCOPY  02/2014    normal s/p gastric bypass    UPPER GASTROINTESTINAL ENDOSCOPY  02/2010    biopsy neg Barretts, chronic reflux esophagitis s/p gastric bypass    UPPER GASTROINTESTINAL ENDOSCOPY  07/2006    unremarkable s/p gastric bypass    UPPER GASTROINTESTINAL ENDOSCOPY  08/10/2015    Dr Kusum Grayson UPPER GASTROINTESTINAL ENDOSCOPY N/A 04/01/2016    Dr. Sykes:  H Pylori(-), HH, o/w normal    UPPER GASTROINTESTINAL ENDOSCOPY N/A 05/07/2021    Dr Albright [facility] hiatal hernia, previous gastric bypass surgery, gastritis    VENA CAVA FILTER PLACEMENT Right 2003      Social History     Socioeconomic History    Marital status:      Spouse name: None    Number of children: 1    Years of education: None    Highest education level: None   Occupational History     Employer: [employer]    Tobacco Use    Smoking status: Never Smoker    Smokeless tobacco: Never Used   Vaping Use    Vaping Use: Never used   Substance and Sexual Activity    Alcohol use: No    Drug use: No    Sexual activity: Not Currently   Other Topics Concern    None   Social History Narrative    Lives with daughter, son-in-law, granddaughter, niece. Drives very little, daughter usually transports pt. Works part time at [employer]. Has pet dog. Social Determinants of Health     Financial Resource Strain: Low Risk     Difficulty of Paying Living Expenses: Not very hard   Food Insecurity: No Food Insecurity    Worried About Running Out of Food in the Last Year: Never true    Cher of Food in the Last Year: Never true   Transportation Needs: No Transportation Needs    Lack of Transportation (Medical): No    Lack of Transportation (Non-Medical): No   Physical Activity: Inactive    Days of Exercise per Week: 0 days    Minutes of Exercise per Session: 0 min   Stress: No Stress Concern Present    Feeling of Stress : Only a little   Social Connections: Moderately Integrated    Frequency of Communication with Friends and Family: More than three times a week    Frequency of Social Gatherings with Friends and Family: More than three times a week    Attends Sabianism Services: More than 4 times per year    Active Member of [address] [place] [address] [employer] or Organizations:  Yes    Attends Club or Organization Meetings: More than 4 times per year    Marital Status:    Intimate Partner Violence:     Fear of Current or Ex-Partner: Not on file    Emotionally Abused: Not on file    Physically Abused: Not on file    Sexually Abused: Not on file   Housing Stability:     Unable to Pay for Housing in the Last Year: Not on file    Number of Places Lived in the Last Year: Not on file    Unstable Housing in the Last Year: Not on file      Family History   Problem Relation Age of Onset    Uterine Cancer Mother     Cervical Cancer Mother     Coronary Art Dis Mother     Heart Disease Father     Lung Cancer Father     Other Father         renal failure    Colon Cancer Brother     Colon Polyps Brother     Uterine Cancer Maternal Grandmother     Cervical Cancer Maternal Grandmother     Diabetes Maternal Grandmother     Cervical Cancer Sister     Other Sister         fibromyalgia    Diabetes Paternal Aunt     Breast Cancer Maternal Cousin     Esophageal Cancer Neg Hx     Liver Cancer Neg Hx     Liver Disease Neg Hx     Stomach Cancer Neg Hx     Rectal Cancer Neg Hx         Current Outpatient Medications   Medication Sig Dispense Refill    doxycycline hyclate (VIBRA-TABS) 100 MG tablet Take 1 tablet by mouth 2 times daily for 10 days 20 tablet 0    methylPREDNISolone (MEDROL DOSEPACK) 4 MG tablet Take 1 tablet by mouth See Admin Instructions Take by mouth.  1 kit 0    montelukast (SINGULAIR) 10 MG tablet Take 1 tablet by mouth daily for 10 days 10 tablet 0    rosuvastatin (CRESTOR) 5 MG tablet Take 1 tablet by mouth daily 30 tablet 5    warfarin (COUMADIN) 7.5 MG tablet 7.5mg on Monday/Wed/Fri and 15mg all other days 180 tablet 3    escitalopram (LEXAPRO) 20 MG tablet Take 1 tablet by mouth daily 90 tablet 3    sucralfate (CARAFATE) 1 GM tablet Take 1 tablet by mouth 4 times daily 120 tablet 3    pantoprazole (PROTONIX) 40 MG tablet TAKE 1 TABLET BY MOUTH TWICE DAILY BEFORE MEAL(S) 180 tablet 0    CPAP Machine MISC Inhale 1 each into the lungs nightly      Cyanocobalamin 1000 MCG/ML KIT Inject as directed every 30 days      gabapentin (NEURONTIN) 100 MG capsule TAKE 1 CAPSULE BY MOUTH THREE TIMES DAILY 270 capsule 0    ferrous gluconate (FERGON) 240 (27 Fe) MG tablet Take 1 tablet by mouth 2 times daily 180 tablet 2    calcitRIOL (ROCALTROL) 0.25 MCG capsule Take 0.25 mcg by mouth daily      bumetanide (BUMEX) 2 MG tablet Take 1 tablet by mouth daily 30 tablet 5    levothyroxine (SYNTHROID) 100 MCG tablet Take 1 tablet by mouth Daily 90 tablet 3    lisinopril (PRINIVIL;ZESTRIL) 40 MG tablet Take 1 tablet by mouth once daily 90 tablet 1    baclofen (LIORESAL) 10 MG tablet Take 1 tablet by mouth 3 times daily As needed for hip pain 10 tablet 1    fexofenadine (ALLEGRA) 180 MG tablet Take 1 tablet by mouth daily Indications: Allergic Rhinitis 90 tablet 3    tiotropium (SPIRIVA RESPIMAT) 2.5 MCG/ACT AERS inhaler Inhale 2 puffs into the lungs daily 1 Inhaler 5    potassium chloride (KLOR-CON) 10 MEQ extended release tablet Take 10 mEq by mouth daily       calcipotriene (DOVONEX) 0.005 % cream Use topically as needed 3 Tube 3    clobetasol (TEMOVATE) 0.05 % cream Apply topically 2 times daily.  3 Tube 3    ondansetron (ZOFRAN) 4 MG tablet Take 1 tablet by mouth daily as needed for Nausea or Vomiting 30 tablet 0    Multiple Vitamins-Minerals (CENTRUM ADULTS) TABS Take 1 tablet by mouth daily      aspirin 81 MG tablet Take 81 mg by mouth daily       Current Facility-Administered Medications   Medication Dose Route Frequency Provider Last Rate Last Admin    cyanocobalamin injection 1,000 mcg  1,000 mcg IntraMUSCular Once Tere Nails MD            Patient Active Problem List   Diagnosis    Gastroesophageal reflux disease    History of gastric bypass    Family history of colon cancer    Fibromyalgia    Encounter for current long-term use of anticoagulants    Primary osteoarthritis of right knee    Arthritis of knee    Essential hypertension    Hyperglycemia    Complex sleep apnea syndrome    Iron deficiency anemia    Restrictive airway disease    DVT, lower extremity, proximal, acute, unspecified laterality (HCC)    History of ulcer disease    Anticoagulated on Coumadin    Postmenopausal osteoporosis    Diabetes mellitus ([address]    Pacemaker    History of DVT (deep vein thrombosis)    Lupus anticoagulant disorder (HCC)    History of pulmonary embolism    Thrombocytopenia ([address]    Hypothyroidism    Morbid obesity due to excess calories (HCC)    Asthmatic bronchitis without complication    Gastroenteritis    Colic    Abdominal pain    Non-intractable vomiting with nausea    Severe episode of recurrent major depressive disorder, without psychotic features ([address]    Lower abdominal pain    Chronic heartburn    S/P gastric bypass    Dog bite    Cellulitis of right upper extremity    Abscess of right arm    Soft tissue infection    Skin ulcer of upper arm, with fat layer exposed ([address]    History of Gage-en-Y gastric bypass    Hyperlipidemia    Hypersomnia    Nonrheumatic mitral valve regurgitation    Obesity, Class II, BMI 35-39.9    Peripheral edema    Restless legs syndrome (RLS)    Vitamin D deficiency    Chronic deep vein thrombosis (DVT) of proximal vein of lower extremity (HCC)        Review of Systems   Constitutional: Positive for fatigue. Negative for activity change, appetite change, chills, diaphoresis, fever and unexpected weight change. HENT: Positive for congestion. Negative for ear pain, hearing loss, nosebleeds, postnasal drip, rhinorrhea, sinus pressure, sinus pain, sneezing, sore throat, tinnitus, trouble swallowing and voice change. Eyes: Negative for discharge and itching. Watery eyes   Respiratory: Positive for cough and shortness of breath. Negative for apnea, chest tightness, wheezing and stridor. Cardiovascular: Positive for leg swelling. Negative for chest pain and palpitations. Left leg swelling; chronic secondary to DVT. Gastrointestinal: Negative for abdominal distention, abdominal pain, blood in stool, constipation, diarrhea, nausea and vomiting. Endocrine: Negative for cold intolerance, heat intolerance, polydipsia, polyphagia and polyuria. Genitourinary: Negative for difficulty urinating, dysuria, flank pain, frequency, hematuria and urgency. Musculoskeletal: Positive for arthralgias and back pain. Negative for gait problem, joint swelling, myalgias, neck pain and neck stiffness. She had bilateral knee pain. Right hip pain with some right-sided back pain radiating to her abdomen. Skin: Negative for color change, pallor, rash and wound. Allergic/Immunologic: Positive for environmental allergies. Negative for food allergies and immunocompromised state. Neurological: Negative for dizziness, tremors, seizures, syncope, facial asymmetry, speech difficulty, weakness, light-headedness, numbness and headaches. Hematological: Negative for adenopathy. Does not bruise/bleed easily. Psychiatric/Behavioral: Positive for dysphoric mood. Negative for agitation, confusion, decreased concentration and hallucinations. The patient is not nervous/anxious and is not hyperactive. Mood has improved       /68   Pulse 85   Ht 5' 7\" (1.702 m)   Wt 243 lb 4.8 oz (110.4 kg)   SpO2 99%   BMI 38.11 kg/m²   Physical Exam  Vitals and nursing note reviewed. Constitutional:       General: She is not in acute distress. Appearance: Normal appearance. She is well-developed. She is obese. She is not ill-appearing, toxic-appearing or diaphoretic. HENT:      Head: Normocephalic and atraumatic. Right Ear: Tympanic membrane, ear canal and external ear normal. There is no impacted cerumen.       Left Ear: Tympanic membrane, ear canal and external ear normal.      Nose: Nose normal. No congestion or rhinorrhea. Mouth/Throat:      Mouth: Mucous membranes are moist.      Pharynx: Oropharynx is clear. No oropharyngeal exudate or posterior oropharyngeal erythema. Eyes:      General: No scleral icterus. Right eye: No discharge. Left eye: No discharge. Extraocular Movements: Extraocular movements intact. Conjunctiva/sclera: Conjunctivae normal.      Pupils: Pupils are equal, round, and reactive to light. Neck:      Thyroid: No thyromegaly. Vascular: No carotid bruit or JVD. Trachea: No tracheal deviation. Cardiovascular:      Rate and Rhythm: Normal rate and regular rhythm. Pulses: Normal pulses. Heart sounds: Normal heart sounds. No murmur heard. No friction rub. No gallop. Pulmonary:      Effort: Pulmonary effort is normal. No respiratory distress. Breath sounds: Normal breath sounds. No stridor. No wheezing, rhonchi or rales. Chest:      Chest wall: No tenderness. Abdominal:      General: Abdomen is flat. Bowel sounds are normal. There is no distension. Palpations: Abdomen is soft. There is no mass. Tenderness: There is no abdominal tenderness. There is no right CVA tenderness, left CVA tenderness, guarding or rebound. Hernia: No hernia is present. Musculoskeletal:         General: No swelling, tenderness, deformity or signs of injury. Normal range of motion. Cervical back: Normal range of motion. No rigidity. No muscular tenderness. Lumbar back: Spasms present. No swelling or bony tenderness. Normal range of motion. Back:       Right lower leg: No edema. Left lower leg: No edema.       Comments:  Does have some hip pain on palpation of the left hip    Visual inspection:  Deformity/amputation: absent  Skin lesions/pre-ulcerative calluses: present to bottom of the left foot  Edema: right- negative, left- negative    Sensory exam:  Monofilament sensation: normal  (minimum of 5 random plantar locations tested, avoiding callused areas - > 1 area with absence of sensation is + for neuropathy)    Plus at least one of the following:  Pulses: normal,   Pinprick: Intact  Proprioception: N/A  Vibration (128 Hz): N/A   Lymphadenopathy:      Cervical: No cervical adenopathy. Skin:     General: Skin is warm and dry. Capillary Refill: Capillary refill takes less than 2 seconds. Coloration: Skin is not jaundiced or pale. Findings: No bruising, erythema, lesion or rash. Neurological:      General: No focal deficit present. Mental Status: She is alert and oriented to person, place, and time. Mental status is at baseline. Cranial Nerves: No cranial nerve deficit. Sensory: No sensory deficit. Motor: No weakness or abnormal muscle tone. Coordination: Coordination normal.      Gait: Gait normal.      Deep Tendon Reflexes: Reflexes normal.   Psychiatric:         Mood and Affect: Mood normal. Mood is not anxious or depressed. Behavior: Behavior normal.         Thought Content: Thought content normal.         Judgment: Judgment normal.         No diagnosis found. ASSESSMENT/PLAN:    78-year-old woman here for follow-up    1. Acute sinusitis/upper respiratory infection with shortness of breath and cough: Ceftriaxone 500 mg IM x1. Depo-Medrol 40 mg IM x1. Chest x-ray ordered. Patient strongly advised to  her medications from the pharmacy. 2. B12 deficiency: B12 injection today    There are no diagnoses linked to this encounter. No follow-ups on file. No orders of the defined types were placed in this encounter.       Cristobal Palacio MD

## 2021-11-09 NOTE — PROGRESS NOTES
1000mcg of B12 given in (R) deltoid from office stock. Pt tolerated well. Kayce Montoya #[id number] LOT #[id number] EXP 9/2022. After obtaining consent, and per orders of Dr. Luis Rivas, injection of 40mg Depo Medrol  given in (R) gluteal are  by Karolee Backer. Patient instructed to remain in clinic for 20 minutes afterwards, and to report any adverse reaction to me immediately. Kayce Montoya #[id number] LOT #[id number] EXP 10/23. After obtaining consent, and per orders of Dr. Luis Rivas, injection of  Rocephin  given in (R) gluteal area by Karolee Backer. Patient instructed to remain in clinic for 20 minutes afterwards, and to report any adverse reaction to me immediately. Kayce Montoya #[id number] LOT #[id number] EXP 10/23.

## 2021-11-12 ENCOUNTER — ANTI-COAG VISIT (OUTPATIENT)
Dept: INTERNAL MEDICINE | Age: 71
End: 2021-11-12
Payer: MEDICARE

## 2021-11-12 DIAGNOSIS — Z79.01 ANTICOAGULATED ON COUMADIN: Primary | ICD-10-CM

## 2021-11-12 LAB — INR BLD: 1.5

## 2021-11-12 PROCEDURE — 93793 ANTICOAG MGMT PT WARFARIN: CPT | Performed by: INTERNAL MEDICINE

## 2021-11-14 ASSESSMENT — ENCOUNTER SYMPTOMS
VOICE CHANGE: 0
WHEEZING: 0
STRIDOR: 0
ABDOMINAL PAIN: 0
VOMITING: 0
SORE THROAT: 0
EYE DISCHARGE: 0
COUGH: 1
APNEA: 0
DIARRHEA: 0
CONSTIPATION: 0
SINUS PRESSURE: 0
BACK PAIN: 1
NAUSEA: 0
SHORTNESS OF BREATH: 1
EYE ITCHING: 0
TROUBLE SWALLOWING: 0
CHEST TIGHTNESS: 0
BLOOD IN STOOL: 0
COLOR CHANGE: 0
RHINORRHEA: 0
SINUS PAIN: 0
ABDOMINAL DISTENTION: 0

## 2021-11-18 ENCOUNTER — ANTI-COAG VISIT (OUTPATIENT)
Dept: PRIMARY CARE CLINIC | Age: 71
End: 2021-11-18
Payer: MEDICARE

## 2021-11-18 DIAGNOSIS — Z79.01 ANTICOAGULATED ON COUMADIN: Primary | ICD-10-CM

## 2021-11-18 LAB — INR BLD: 1.5

## 2021-11-18 PROCEDURE — 93793 ANTICOAG MGMT PT WARFARIN: CPT | Performed by: INTERNAL MEDICINE

## 2021-11-18 NOTE — PROGRESS NOTES
HOME MONITORING REPORT    INR today:   Results for orders placed or performed in visit on 11/18/21   Protime-INR   Result Value Ref Range    INR 1.50        INR Goal: 2.0-3.0    Dosing Plan  As of 11/18/2021    TTR:  25.2 % (3.5 y)   Full warfarin instructions:  11/19: 15 mg; Otherwise 7.5 mg every Mon, Wed, Fri; 15 mg all other days            Patient missed a 15 mg dose this week. She is on antibiotics and steroids so will not do a large increase. PLAN: Advised patient/caregiver to increase her dose tomorrow night to 15 mg, then continue current dose and recheck next Tuesday. .  Patient/Caregiver voiced understanding      Electronically signed by Yue Drake MD on 11/18/2021 at 4:11 PM      Electronically signed by Yue Drake MD on 11/18/2021 at 4:12 PM    I have reviewed nursing plan for Coumadin management and agree with plan.

## 2021-11-22 ENCOUNTER — TELEPHONE (OUTPATIENT)
Dept: NEUROLOGY | Facility: CLINIC | Age: 71
End: 2021-11-22

## 2021-11-22 NOTE — TELEPHONE ENCOUNTER
Provider: CHARLEEN JOHNSON   Caller: SHANNON   Relationship to Patient: PT  Reason for Call: PT STATES THAT SHE HASN'T HEARD ANYTHING FROM [facility] ABOUT HER MASK FITTING, ADVISED HER THAT ORDER WAS SENT AND GAVE HER NUMBER FOR [facility].

## 2021-11-22 NOTE — TELEPHONE ENCOUNTER
Spoke with Mrs. Stewart and let her know that [facility] has the orders and they should be calling her soon for a new mask and machine concerns.   LS

## 2021-11-29 ENCOUNTER — PATIENT MESSAGE (OUTPATIENT)
Dept: INTERNAL MEDICINE | Age: 71
End: 2021-11-29

## 2021-11-29 LAB — INR BLD: 2.4

## 2021-11-29 RX ORDER — BENZONATATE 200 MG/1
200 CAPSULE ORAL 3 TIMES DAILY PRN
Qty: 30 CAPSULE | Refills: 0 | Status: SHIPPED | OUTPATIENT
Start: 2021-11-29 | End: 2021-12-09

## 2021-11-29 NOTE — TELEPHONE ENCOUNTER
Try Tessalon Perles 200 mg p.o. 3 times daily as needed. Call in 30 tablets. She coughing up anything else?

## 2021-11-30 ENCOUNTER — ANTI-COAG VISIT (OUTPATIENT)
Dept: INTERNAL MEDICINE | Age: 71
End: 2021-11-30
Payer: MEDICARE

## 2021-11-30 DIAGNOSIS — Z79.01 ANTICOAGULATED ON COUMADIN: Primary | ICD-10-CM

## 2021-11-30 PROCEDURE — 93793 ANTICOAG MGMT PT WARFARIN: CPT | Performed by: INTERNAL MEDICINE

## 2021-11-30 NOTE — PROGRESS NOTES
HOME MONITORING REPORT    INR today:   Results for orders placed or performed in visit on 11/30/21   Protime-INR   Result Value Ref Range    INR 2.40        INR Goal: 2.0-3.0    Dosing Plan  As of 11/30/2021    TTR:  25.4 % (3.5 y)   Full warfarin instructions:  7.5 mg every Mon, Wed, Fri; 15 mg all other days              PLAN: Advised patient/caregiver to continue current dose and recheck in one week. Patient/Caregiver voiced understanding      Electronically signed by Tricia Purdy MD on 11/30/2021 at 4:54 PM    I have reviewed nursing plan for Coumadin management and agree with plan.

## 2021-12-01 ENCOUNTER — TELEPHONE (OUTPATIENT)
Dept: NEUROLOGY | Facility: CLINIC | Age: 71
End: 2021-12-01

## 2021-12-01 NOTE — TELEPHONE ENCOUNTER
[facility] and they are suppose to contact her soon. As of today she has not heard from [facility]. I will attempt to call them and put her on their list for new mask and machine service.   LS

## 2021-12-02 ENCOUNTER — ANTI-COAG VISIT (OUTPATIENT)
Dept: PRIMARY CARE CLINIC | Age: 71
End: 2021-12-02
Payer: MEDICARE

## 2021-12-02 DIAGNOSIS — R05.9 COUGH: ICD-10-CM

## 2021-12-02 DIAGNOSIS — R09.89 CHEST CONGESTION: Primary | ICD-10-CM

## 2021-12-02 DIAGNOSIS — Z79.01 ANTICOAGULATED ON COUMADIN: Primary | ICD-10-CM

## 2021-12-02 LAB — INR BLD: 4.3

## 2021-12-02 PROCEDURE — 93793 ANTICOAG MGMT PT WARFARIN: CPT | Performed by: INTERNAL MEDICINE

## 2021-12-02 NOTE — TELEPHONE ENCOUNTER
PLease advise, pt states that she has been on the tessalon pearls now for a while. The ones we sent in 3 days ago are the 2nd round. Pt states it is not working and she wants something else.

## 2021-12-02 NOTE — PROGRESS NOTES
HOME MONITORING REPORT    INR today:   Results for orders placed or performed in visit on 12/02/21   Protime-INR   Result Value Ref Range    INR 4.30        INR Goal: 2.0-3.0    Dosing Plan  As of 12/2/2021    TTR:  25.4 % (3.5 y)   Full warfarin instructions:  12/2: Hold; 12/4: 7.5 mg; Otherwise 7.5 mg every Mon, Wed, Fri; 15 mg all other days          patient states she gets mixed up and so she just takes two tablets a day. PLAN: Advised patient/caregiver to hold dose tonight, reduce dose Saturday to 7.5 mg this week. I reviewed correct dosing with her and strongly advised she follow correct schedule and recheck in one week. Patient/Caregiver voiced understanding      Electronically signed by Janes Springer MD on 12/2/2021 at 4:41 PM    I have reviewed nursing plan for Coumadin management and agree with plan.

## 2021-12-03 NOTE — TELEPHONE ENCOUNTER
PATIENT CALLED BACK ([phone]). SHE SAYS SHE JUST S/W [facility] AND THAT THEY STILL HAVEN'T RECEIVED NEW ORDER. PLEASE ADVISE

## 2021-12-06 ENCOUNTER — OFFICE VISIT (OUTPATIENT)
Dept: INTERNAL MEDICINE | Age: 71
End: 2021-12-06
Payer: MEDICARE

## 2021-12-06 ENCOUNTER — HOSPITAL ENCOUNTER (OUTPATIENT)
Dept: GENERAL RADIOLOGY | Age: 71
Discharge: HOME | End: 2021-12-06
Payer: MEDICARE

## 2021-12-06 VITALS
SYSTOLIC BLOOD PRESSURE: 148 MMHG | TEMPERATURE: 97.6 F | HEART RATE: 78 BPM | OXYGEN SATURATION: 99 % | DIASTOLIC BLOOD PRESSURE: 88 MMHG

## 2021-12-06 DIAGNOSIS — R05.9 COUGH: ICD-10-CM

## 2021-12-06 DIAGNOSIS — R05.9 COUGH: Primary | ICD-10-CM

## 2021-12-06 DIAGNOSIS — R09.89 CHEST CONGESTION: ICD-10-CM

## 2021-12-06 LAB
ADENOVIRUS BY PCR: NOT DETECTED
BORDETELLA PARAPERTUSSIS BY PCR: NOT DETECTED
BORDETELLA PERTUSSIS BY PCR: NOT DETECTED
CHLAMYDOPHILIA PNEUMONIAE BY PCR: NOT DETECTED
CORONAVIRUS 229E BY PCR: NOT DETECTED
CORONAVIRUS HKU1 BY PCR: NOT DETECTED
CORONAVIRUS NL63 BY PCR: NOT DETECTED
CORONAVIRUS OC43 BY PCR: NOT DETECTED
HUMAN METAPNEUMOVIRUS BY PCR: NOT DETECTED
HUMAN RHINOVIRUS/ENTEROVIRUS BY PCR: NOT DETECTED
INFLUENZA A BY PCR: NOT DETECTED
INFLUENZA B BY PCR: NOT DETECTED
MYCOPLASMA PNEUMONIAE BY PCR: NOT DETECTED
PARAINFLUENZA VIRUS 1 BY PCR: NOT DETECTED
PARAINFLUENZA VIRUS 2 BY PCR: NOT DETECTED
PARAINFLUENZA VIRUS 3 BY PCR: NOT DETECTED
PARAINFLUENZA VIRUS 4 BY PCR: NOT DETECTED
RESPIRATORY SYNCYTIAL VIRUS BY PCR: NOT DETECTED
SARS-COV-2, PCR: NOT DETECTED

## 2021-12-06 PROCEDURE — 1090F PRES/ABSN URINE INCON ASSESS: CPT | Performed by: NURSE PRACTITIONER

## 2021-12-06 PROCEDURE — G8399 PT W/DXA RESULTS DOCUMENT: HCPCS | Performed by: NURSE PRACTITIONER

## 2021-12-06 PROCEDURE — G8484 FLU IMMUNIZE NO ADMIN: HCPCS | Performed by: NURSE PRACTITIONER

## 2021-12-06 PROCEDURE — 1036F TOBACCO NON-USER: CPT | Performed by: NURSE PRACTITIONER

## 2021-12-06 PROCEDURE — G8428 CUR MEDS NOT DOCUMENT: HCPCS | Performed by: NURSE PRACTITIONER

## 2021-12-06 PROCEDURE — 4040F PNEUMOC VAC/ADMIN/RCVD: CPT | Performed by: NURSE PRACTITIONER

## 2021-12-06 PROCEDURE — G8417 CALC BMI ABV UP PARAM F/U: HCPCS | Performed by: NURSE PRACTITIONER

## 2021-12-06 PROCEDURE — 1123F ACP DISCUSS/DSCN MKR DOCD: CPT | Performed by: NURSE PRACTITIONER

## 2021-12-06 PROCEDURE — 99213 OFFICE O/P EST LOW 20 MIN: CPT | Performed by: NURSE PRACTITIONER

## 2021-12-06 PROCEDURE — 3017F COLORECTAL CA SCREEN DOC REV: CPT | Performed by: NURSE PRACTITIONER

## 2021-12-06 PROCEDURE — 71046 X-RAY EXAM CHEST 2 VIEWS: CPT

## 2021-12-06 RX ORDER — BENZONATATE 200 MG/1
200 CAPSULE ORAL 3 TIMES DAILY PRN
Qty: 30 CAPSULE | Refills: 0 | Status: SHIPPED | OUTPATIENT
Start: 2021-12-06 | End: 2021-12-13

## 2021-12-06 ASSESSMENT — ENCOUNTER SYMPTOMS
WHEEZING: 0
VOMITING: 0
COUGH: 1
TROUBLE SWALLOWING: 0
NAUSEA: 0
BLOOD IN STOOL: 0
DIARRHEA: 0
STRIDOR: 0
ABDOMINAL DISTENTION: 0
COLOR CHANGE: 0
ABDOMINAL PAIN: 0
CHOKING: 0
EYE DISCHARGE: 0
SORE THROAT: 0
EYE ITCHING: 0
SHORTNESS OF BREATH: 0
CONSTIPATION: 0

## 2021-12-06 NOTE — PROGRESS NOTES
[id number]    [address] [facility]  [address] [facility] [phone] [address]  Dept: [phone]  Dept Fax: [phone]: [phone]    David Jung (:  1950) is a 66 y.o. female,Established patient Jud Hager, here for evaluation of the following chief complaint(s): Cough      David Jung is a 66 y.o. female who presents today for her medical conditions/complaints as noted below. David Spine is c/joshua Cough        HPI:     Chief Complaint   Patient presents with    Cough     HPI   1. Cough; For weeks; She had COVID testing but it was a month ago; She had zpak and medrol dosepak; She says she has coughed so much she is sore all oaver;  Congestion is choign her at times           She took flu and last booster and started on worse. She is really had no fever and she is afebrile today.   Past Medical History:   Diagnosis Date    Abdominal pain     Abnormal EKG     Acute sinusitis     Allergic reaction to spider bite     Anemia     Anticoagulated     Anxiety     Arrhythmia     Asthmatic bronchitis without complication     Ataxic gait     Lyons's esophagus     Bowel obstruction (HCC)     Burn of abdomen wall, second degree, initial encounter 2018    Callus     Cardiac pacemaker     CKD (chronic kidney disease) stage 2, GFR 60-89 ml/min     Coat's syndrome     Coat's syndrome     both eyes    Depression     Diabetes mellitus type 2 in nonobese ([address]     Diabetic nephropathy (HCC)     Disequilibrium     Dizziness     DVT (deep venous thrombosis) (HCC)     Exudative retinopathy     Fibromyalgia     Fibromyositis     Gastric ulcer     GERD (gastroesophageal reflux disease)     History of gastric bypass     Hx of lupus anticoagulant disorder     Hyperlipidemia 2019    Hypertension     Hypothyroidism     Intermittent claudication (HCC)     Intestinal obstruction (HCC)     Iron deficiency     Left-sided weakness     Low vitamin D level     Lupus (HCC)     Menopause     Obesity     Osteoarthritis     Osteoporosis     Other iron deficiency anemias     Palliative care patient 09/24/2020    Pernicious anemia     PUPP (pruritic urticarial papules and plaques of pregnancy)     Restless legs syndrome (RLS) 7/11/2019    Right leg numbness     Right sided sciatica     Sarcoidosis     with liver involvement    Sciatica     Secondary hyperparathyroidism ([address]     Shingles     Shortness of breath     Sleep apnea     Stomach ulcer     Syncope     Visual loss, one eye       Past Surgical History:   Procedure Laterality Date    APPENDECTOMY      CARDIAC CATHETERIZATION  10/21/13  [facility]    EF over 60%    CHOLECYSTECTOMY      COLONOSCOPY  02/2010    negative    COLONOSCOPY  02/22/2010    Dr Isaura Quezada    COLONOSCOPY  04/01/2016    Dr LAKHWINDER Horvath-internal hemorrhoids, 5 yr recall    COLONOSCOPY N/A 05/07/2021    Dr Mendy Aleman looping/tortuosity throughout the left colon, BE=Normal    DILATATION, ESOPHAGUS      EYE SURGERY      Cyst on Right eye    EYE SURGERY      GASTRIC BYPASS SURGERY      GASTRIC BYPASS SURGERY      HERNIA REPAIR      HYSTERECTOMY      Complete    HYSTERECTOMY      Partial - because had a tubal pregnancy.      INCONTINENCE SURGERY      Bladder Sling    INFECTED SKIN DEBRIDEMENT Right     dog bite right forearm    OTHER SURGICAL HISTORY      IVC filter    PACEMAKER INSERTION      PACEMAKER PLACEMENT      medtronic    NE TOTAL KNEE ARTHROPLASTY Right 03/26/2018    TOTAL KNEE REPLACEMENT COMPLEX PRIMARY performed by Sharlene Castillo MD at [facility] [address] [facility] SMALL INTESTINE SURGERY      SPLENECTOMY      KRISTEL AND BSO      TONSILLECTOMY AND ADENOIDECTOMY      TOTAL KNEE ARTHROPLASTY      UPPER GASTROINTESTINAL ENDOSCOPY  12/2011    gerd s/p gastric bypass    UPPER GASTROINTESTINAL ENDOSCOPY  02/2014    normal s/p gastric bypass    UPPER GASTROINTESTINAL ENDOSCOPY  02/2010    biopsy neg Barretts, chronic reflux esophagitis s/p gastric bypass    UPPER GASTROINTESTINAL ENDOSCOPY  07/2006    unremarkable s/p gastric bypass    UPPER GASTROINTESTINAL ENDOSCOPY  08/10/2015    Dr Maritza Rodriguez UPPER GASTROINTESTINAL ENDOSCOPY N/A 04/01/2016    Dr. Steele Art:  H Pylori(-), HH, o/w normal    UPPER GASTROINTESTINAL ENDOSCOPY N/A 05/07/2021    Dr Veronique Kuhn hiatal hernia, previous gastric bypass surgery, gastritis    VENA CAVA FILTER PLACEMENT Right 2003       Vitals 12/6/2021 11/9/2021 10/11/2021 7/7/2021 7/7/2021 7/8/6957   SYSTOLIC 701 [phone]   DIASTOLIC 88 68 78 72 70 489   Pulse 78 85 90 - 68 80   Temp 97.6 - - - - 97.8   Resp - - - - 22 18   SpO2 99 99 98 - 97 -   Weight - 243 lb 4.8 oz 243 lb 3.2 oz - 237 lb 238 lb   Height - 5' 7\" 5' 7\" - 5' 7\" 5' 7\"   Body mass index - 38.1 kg/m2 38.09 kg/m2 - 37.12 kg/m2 37.27 kg/m2   Pain Level - - - - - 0   Some recent data might be hidden       Family History   Problem Relation Age of Onset    Uterine Cancer Mother     Cervical Cancer Mother     Coronary Art Dis Mother     Heart Disease Father     Lung Cancer Father     Other Father         renal failure    Colon Cancer Brother     Colon Polyps Brother     Uterine Cancer Maternal Grandmother     Cervical Cancer Maternal Grandmother     Diabetes Maternal Grandmother     Cervical Cancer Sister     Other Sister         fibromyalgia    Diabetes Paternal Aunt     Breast Cancer Maternal Cousin     Esophageal Cancer Neg Hx     Liver Cancer Neg Hx     Liver Disease Neg Hx     Stomach Cancer Neg Hx     Rectal Cancer Neg Hx        Social History     Tobacco Use    Smoking status: Never Smoker    Smokeless tobacco: Never Used   Substance Use Topics    Alcohol use: No      Current Outpatient Medications   Medication Sig Dispense Refill    benzonatate (TESSALON) 200 MG capsule Take 1 capsule by mouth 3 times daily as needed for Cough 30 capsule 0    benzonatate (TESSALON) 200 MG capsule Take 1 capsule by mouth 3 times daily as needed for Cough 30 capsule 0    methylPREDNISolone (MEDROL DOSEPACK) 4 MG tablet Take 1 tablet by mouth See Admin Instructions Take by mouth. 1 kit 0    montelukast (SINGULAIR) 10 MG tablet Take 1 tablet by mouth daily for 10 days 10 tablet 0    rosuvastatin (CRESTOR) 5 MG tablet Take 1 tablet by mouth daily 30 tablet 5    warfarin (COUMADIN) 7.5 MG tablet 7.5mg on Monday/Wed/Fri and 15mg all other days 180 tablet 3    escitalopram (LEXAPRO) 20 MG tablet Take 1 tablet by mouth daily 90 tablet 3    sucralfate (CARAFATE) 1 GM tablet Take 1 tablet by mouth 4 times daily 120 tablet 3    pantoprazole (PROTONIX) 40 MG tablet TAKE 1 TABLET BY MOUTH TWICE DAILY BEFORE MEAL(S) 180 tablet 0    CPAP Machine MISC Inhale 1 each into the lungs nightly      Cyanocobalamin 1000 MCG/ML KIT Inject as directed every 30 days      gabapentin (NEURONTIN) 100 MG capsule TAKE 1 CAPSULE BY MOUTH THREE TIMES DAILY 270 capsule 0    ferrous gluconate (FERGON) 240 (27 Fe) MG tablet Take 1 tablet by mouth 2 times daily 180 tablet 2    calcitRIOL (ROCALTROL) 0.25 MCG capsule Take 0.25 mcg by mouth daily      bumetanide (BUMEX) 2 MG tablet Take 1 tablet by mouth daily 30 tablet 5    levothyroxine (SYNTHROID) 100 MCG tablet Take 1 tablet by mouth Daily 90 tablet 3    lisinopril (PRINIVIL;ZESTRIL) 40 MG tablet Take 1 tablet by mouth once daily 90 tablet 1    baclofen (LIORESAL) 10 MG tablet Take 1 tablet by mouth 3 times daily As needed for hip pain 10 tablet 1    fexofenadine (ALLEGRA) 180 MG tablet Take 1 tablet by mouth daily Indications:  Allergic Rhinitis 90 tablet 3    tiotropium (SPIRIVA RESPIMAT) 2.5 MCG/ACT AERS inhaler Inhale 2 puffs into the lungs daily 1 Inhaler 5    potassium chloride (KLOR-CON) 10 MEQ extended release tablet Take 10 mEq by mouth daily       calcipotriene (DOVONEX) 0.005 % cream Use topically as needed 3 Tube 3    clobetasol (TEMOVATE) 0.05 % cream Apply topically 2 times daily.  3 Tube 3    ondansetron (ZOFRAN) 4 MG tablet Take 1 tablet by mouth daily as needed for Nausea or Vomiting 30 tablet 0    Multiple Vitamins-Minerals (CENTRUM ADULTS) TABS Take 1 tablet by mouth daily      aspirin 81 MG tablet Take 81 mg by mouth daily       Current Facility-Administered Medications   Medication Dose Route Frequency Provider Last Rate Last Admin    cyanocobalamin injection 1,000 mcg  1,000 mcg IntraMUSCular Once Katarzyna Leiva MD         Allergies   Allergen Reactions    Insect Extract Allergy Skin Test Anaphylaxis     Bee stings    Lactose Intolerance (Gi)     Prednisone      Headache and upset stomach    Zanaflex [Tizanidine Hcl]      Passed out lost control of body functions    Lortab [Hydrocodone-Acetaminophen] Nausea And Vomiting     Sweats, weak, nausea and vomiting    Other Nausea And Vomiting     Opiates------sweating, weak        Oxycodone-Acetaminophen Nausea And Vomiting     Sweating and vomiting     Ultram [Tramadol Hcl] Nausea And Vomiting     Sweating, weak, nausea and vomiting         Health Maintenance   Topic Date Due    Meningococcal (ACWY) vaccine (1 - Risk start 2-23 months series) Never done    Hib vaccine (1 of 1 - Risk 1-dose series) Never done    Diabetic retinal exam  Never done    Meningococcal B vaccine (1 of 4 - Increased Risk Bexsero 2-dose series) Never done    Shingles Vaccine (2 of 3) 12/07/2015    Annual Wellness Visit (AWV)  03/19/2022    Breast cancer screen  06/01/2022    A1C test (Diabetic or Prediabetic)  10/07/2022    Diabetic microalbuminuria test  10/07/2022    Lipid screen  10/07/2022    TSH testing  10/07/2022    Potassium monitoring  10/07/2022    Creatinine monitoring  10/07/2022    Diabetic foot exam  10/11/2022    Colon cancer screen colonoscopy  05/07/2026    DTaP/Tdap/Td vaccine (3 - Td or Tdap) 08/17/2028    DEXA (modify frequency per FRAX score) from today with negative respiratory panel       Subjective:      Review of Systems   Constitutional: Positive for fatigue. Negative for fever and unexpected weight change. HENT: Negative for ear discharge, ear pain, mouth sores, sore throat and trouble swallowing. Eyes: Negative for discharge, itching and visual disturbance. Respiratory: Positive for cough. Negative for choking, shortness of breath, wheezing and stridor. Cardiovascular: Negative for chest pain, palpitations and leg swelling. Gastrointestinal: Negative for abdominal distention, abdominal pain, blood in stool, constipation, diarrhea, nausea and vomiting. Endocrine: Negative for cold intolerance, polydipsia and polyuria. Genitourinary: Negative for difficulty urinating, dysuria, frequency and urgency. Musculoskeletal: Negative for arthralgias and gait problem. Skin: Negative for color change and rash. Allergic/Immunologic: Negative for food allergies and immunocompromised state. Neurological: Negative for dizziness, tremors, syncope, speech difficulty, weakness and headaches. Hematological: Negative for adenopathy. Does not bruise/bleed easily. Psychiatric/Behavioral: Negative for confusion and hallucinations. Objective:     Physical Exam  Constitutional:       General: She is not in acute distress. Appearance: She is well-developed. HENT:      Head: Normocephalic and atraumatic. Eyes:      General: No scleral icterus. Right eye: No discharge. Left eye: No discharge. Pupils: Pupils are equal, round, and reactive to light. Neck:      Thyroid: No thyromegaly. Vascular: No JVD. Cardiovascular:      Rate and Rhythm: Normal rate and regular rhythm. Heart sounds: Normal heart sounds. No murmur heard. Pulmonary:      Effort: Pulmonary effort is normal. No respiratory distress. Breath sounds: Normal breath sounds. No wheezing or rales.    Abdominal:      General: Bowel sounds are normal. There is no distension. Palpations: Abdomen is soft. There is no mass. Tenderness: There is no abdominal tenderness. There is no guarding or rebound. Musculoskeletal:         General: No tenderness. Normal range of motion. Cervical back: Normal range of motion and neck supple. Skin:     General: Skin is warm and dry. Findings: No erythema or rash. Neurological:      Mental Status: She is alert and oriented to person, place, and time. Cranial Nerves: No cranial nerve deficit. Coordination: Coordination normal.      Deep Tendon Reflexes: Reflexes are normal and symmetric. Reflexes normal.   Psychiatric:         Mood and Affect: Mood is not depressed. Behavior: Behavior normal.         Thought Content: Thought content normal.         Judgment: Judgment normal.       BP (!) 148/88   Pulse 78   Temp 97.6 °F (36.4 °C)   SpO2 99%           Assessment:      Problem List     Cough - Primary     We will get the respiratory panel on her          Relevant Medications    methylPREDNISolone (MEDROL DOSEPACK) 4 MG tablet          Plan:   Given the fact that her respiratory panel is negative she does not have a fever her pulse ox is 99 I think we can properly just give her Tessalon Perles and offer Delsym cough syrup. She just had a steroid Dosepak less than a month ago. Patient given educational materials - see patient instructions. Discussed use, benefit, and side effects of prescribed medications. Allpatient questions answered. Pt voiced understanding. Reviewed health maintenance. Instructed to continue current medications, diet and exercise. Patient agreed with treatment plan. Follow up as directed.    MEDICATIONS:  Orders Placed This Encounter   Medications    benzonatate (TESSALON) 200 MG capsule     Sig: Take 1 capsule by mouth 3 times daily as needed for Cough     Dispense:  30 capsule     Refill:  0         ORDERS:  No orders of the defined types were placed in this encounter. Follow-up:  No follow-ups on file. PATIENT INSTRUCTIONS:  Patient Instructions   1. Cough we will get a respiratory panel and call her later today with those results  Respiratory panel is negative. We will get her some Tessalon Perles and offer Delsym cough syrup; Electronically signed by CHANELLE Shafer on 12/6/2021 at 10:43 AM    @On this date 12/6/2021 I have spent 18  minutes reviewing previous notes, test results and face to face with the patient discussing the diagnosis and importance of compliance with the treatment plan as well as documenting on the day of the visit. EMR[employer]agon/transcription disclaimer:  Much of this encounter note is electronic transcription/translation of spoken language to printed texts. The electronic translation of spoken language may be erroneous, or at times,nonsensical words or phrases may be inadvertently transcribed.   Although I have reviewed the note for such errors, some may still exist.

## 2021-12-06 NOTE — PATIENT INSTRUCTIONS
1.  Cough we will get a respiratory panel and call her later today with those results  Respiratory panel is negative.   We will get her some Tessalon Perles and offer Delsym cough syrup;

## 2021-12-09 LAB — INR BLD: 4.4

## 2021-12-10 ENCOUNTER — ANTI-COAG VISIT (OUTPATIENT)
Dept: INTERNAL MEDICINE | Age: 71
End: 2021-12-10
Payer: MEDICARE

## 2021-12-10 DIAGNOSIS — Z79.01 ANTICOAGULATED ON COUMADIN: Primary | ICD-10-CM

## 2021-12-10 PROCEDURE — 93793 ANTICOAG MGMT PT WARFARIN: CPT | Performed by: INTERNAL MEDICINE

## 2021-12-10 NOTE — PROGRESS NOTES
HOME MONITORING REPORT    INR today:   Results for orders placed or performed in visit on 12/10/21   Protime-INR   Result Value Ref Range    INR 4.40        INR Goal: 2.0-3.0    Dosing Plan  As of 12/10/2021    TTR:  25.3 % (3.6 y)   Full warfarin instructions:  12/10: Hold; 12/11: Torrey Mk; 12/12: Hold; Otherwise 7.5 mg every Mon, Wed, Fri; 15 mg all other days              PLAN: Advised patient/caregiver to hold X3 and Recheck 12/13/21. Patient/Caregiver voiced understanding      Electronically signed by Sharon Chong MD on 12/10/2021 at 9:02 AM    I have reviewed nursing plan for Coumadin management and agree with plan.

## 2021-12-13 NOTE — TELEPHONE ENCOUNTER
[facility] called and said they do not have orders for CPAP and supplies. They said they have been supplier since 2020. Patient wanting a full face mask and not nasal. They are not apart of [facility]. Please advise.     Fax: [phone]

## 2021-12-16 ENCOUNTER — ANTI-COAG VISIT (OUTPATIENT)
Dept: PRIMARY CARE CLINIC | Age: 71
End: 2021-12-16
Payer: MEDICARE

## 2021-12-16 ENCOUNTER — OFFICE VISIT (OUTPATIENT)
Dept: INTERNAL MEDICINE | Age: 71
End: 2021-12-16
Payer: MEDICARE

## 2021-12-16 VITALS
HEART RATE: 82 BPM | SYSTOLIC BLOOD PRESSURE: 168 MMHG | DIASTOLIC BLOOD PRESSURE: 110 MMHG | OXYGEN SATURATION: 93 % | WEIGHT: 243.2 LBS | RESPIRATION RATE: 20 BRPM | BODY MASS INDEX: 38.17 KG/M2 | HEIGHT: 67 IN

## 2021-12-16 DIAGNOSIS — M79.661 RIGHT CALF PAIN: ICD-10-CM

## 2021-12-16 DIAGNOSIS — R60.0 BILATERAL LOWER EXTREMITY EDEMA: ICD-10-CM

## 2021-12-16 DIAGNOSIS — R60.0 BILATERAL LOWER EXTREMITY EDEMA: Primary | ICD-10-CM

## 2021-12-16 DIAGNOSIS — I10 PRIMARY HYPERTENSION: ICD-10-CM

## 2021-12-16 DIAGNOSIS — D68.59 HYPERCOAGULABLE STATE (HCC): ICD-10-CM

## 2021-12-16 DIAGNOSIS — E53.8 B12 DEFICIENCY: ICD-10-CM

## 2021-12-16 DIAGNOSIS — Z79.01 ANTICOAGULATED ON COUMADIN: Primary | ICD-10-CM

## 2021-12-16 LAB
ALBUMIN SERPL-MCNC: 4.3 G/DL (ref 3.5–5.2)
ALP BLD-CCNC: 86 U/L (ref 35–104)
ALT SERPL-CCNC: 11 U/L (ref 5–33)
ANION GAP SERPL CALCULATED.3IONS-SCNC: 9 MMOL/L (ref 7–19)
AST SERPL-CCNC: 21 U/L (ref 5–32)
BASOPHILS ABSOLUTE: 0 K/UL (ref 0–0.2)
BASOPHILS RELATIVE PERCENT: 0.5 % (ref 0–1)
BILIRUB SERPL-MCNC: 0.4 MG/DL (ref 0.2–1.2)
BUN BLDV-MCNC: 14 MG/DL (ref 8–23)
CALCIUM SERPL-MCNC: 8.9 MG/DL (ref 8.8–10.2)
CHLORIDE BLD-SCNC: 105 MMOL/L (ref 98–111)
CO2: 26 MMOL/L (ref 22–29)
CREAT SERPL-MCNC: 1.1 MG/DL (ref 0.5–0.9)
EOSINOPHILS ABSOLUTE: 0.1 K/UL (ref 0–0.6)
EOSINOPHILS RELATIVE PERCENT: 1.4 % (ref 0–5)
GFR AFRICAN AMERICAN: >59
GFR NON-AFRICAN AMERICAN: 49
GLUCOSE BLD-MCNC: 108 MG/DL (ref 74–109)
HCT VFR BLD CALC: 39 % (ref 37–47)
HEMOGLOBIN: 12.1 G/DL (ref 12–16)
IMMATURE GRANULOCYTES #: 0 K/UL
INR BLD: 2.1
INR BLD: 2.19
INR BLD: 2.19 (ref 0.88–1.18)
LYMPHOCYTES ABSOLUTE: 1.6 K/UL (ref 1.1–4.5)
LYMPHOCYTES RELATIVE PERCENT: 28.8 % (ref 20–40)
MCH RBC QN AUTO: 29.4 PG (ref 27–31)
MCHC RBC AUTO-ENTMCNC: 31 G/DL (ref 33–37)
MCV RBC AUTO: 94.7 FL (ref 81–99)
MONOCYTES ABSOLUTE: 0.5 K/UL (ref 0–0.9)
MONOCYTES RELATIVE PERCENT: 9.5 % (ref 0–10)
NEUTROPHILS ABSOLUTE: 3.3 K/UL (ref 1.5–7.5)
NEUTROPHILS RELATIVE PERCENT: 59.6 % (ref 50–65)
PDW BLD-RTO: 16.9 % (ref 11.5–14.5)
PLATELET # BLD: 179 K/UL (ref 130–400)
PMV BLD AUTO: 12.1 FL (ref 9.4–12.3)
POTASSIUM SERPL-SCNC: 4.2 MMOL/L (ref 3.5–5)
PROTHROMBIN TIME: 24 SEC (ref 12–14.6)
RBC # BLD: 4.12 M/UL (ref 4.2–5.4)
SODIUM BLD-SCNC: 140 MMOL/L (ref 136–145)
TOTAL PROTEIN: 8.1 G/DL (ref 6.6–8.7)
WBC # BLD: 5.6 K/UL (ref 4.8–10.8)

## 2021-12-16 PROCEDURE — 3017F COLORECTAL CA SCREEN DOC REV: CPT | Performed by: INTERNAL MEDICINE

## 2021-12-16 PROCEDURE — 99214 OFFICE O/P EST MOD 30 MIN: CPT | Performed by: INTERNAL MEDICINE

## 2021-12-16 PROCEDURE — G8427 DOCREV CUR MEDS BY ELIG CLIN: HCPCS | Performed by: INTERNAL MEDICINE

## 2021-12-16 PROCEDURE — 1090F PRES/ABSN URINE INCON ASSESS: CPT | Performed by: INTERNAL MEDICINE

## 2021-12-16 PROCEDURE — 93793 ANTICOAG MGMT PT WARFARIN: CPT | Performed by: INTERNAL MEDICINE

## 2021-12-16 PROCEDURE — G8399 PT W/DXA RESULTS DOCUMENT: HCPCS | Performed by: INTERNAL MEDICINE

## 2021-12-16 PROCEDURE — 1123F ACP DISCUSS/DSCN MKR DOCD: CPT | Performed by: INTERNAL MEDICINE

## 2021-12-16 PROCEDURE — G8484 FLU IMMUNIZE NO ADMIN: HCPCS | Performed by: INTERNAL MEDICINE

## 2021-12-16 PROCEDURE — 4040F PNEUMOC VAC/ADMIN/RCVD: CPT | Performed by: INTERNAL MEDICINE

## 2021-12-16 PROCEDURE — 96372 THER/PROPH/DIAG INJ SC/IM: CPT | Performed by: INTERNAL MEDICINE

## 2021-12-16 PROCEDURE — 1036F TOBACCO NON-USER: CPT | Performed by: INTERNAL MEDICINE

## 2021-12-16 PROCEDURE — G8417 CALC BMI ABV UP PARAM F/U: HCPCS | Performed by: INTERNAL MEDICINE

## 2021-12-16 RX ORDER — CYANOCOBALAMIN 1000 UG/ML
1000 INJECTION INTRAMUSCULAR; SUBCUTANEOUS ONCE
Status: COMPLETED | OUTPATIENT
Start: 2021-12-16 | End: 2021-12-16

## 2021-12-16 RX ADMIN — CYANOCOBALAMIN 1000 MCG: 1000 INJECTION INTRAMUSCULAR; SUBCUTANEOUS at 12:01

## 2021-12-16 ASSESSMENT — ENCOUNTER SYMPTOMS
DIARRHEA: 0
ABDOMINAL PAIN: 0
APNEA: 0
ABDOMINAL DISTENTION: 0
BLOOD IN STOOL: 0
EYE DISCHARGE: 0
COLOR CHANGE: 0
SHORTNESS OF BREATH: 0
EYE ITCHING: 0
NAUSEA: 0
SINUS PAIN: 0
BACK PAIN: 1
WHEEZING: 0
CONSTIPATION: 0
SINUS PRESSURE: 0
VOICE CHANGE: 0
VOMITING: 0
TROUBLE SWALLOWING: 0
RHINORRHEA: 0
CHEST TIGHTNESS: 0
STRIDOR: 0
SORE THROAT: 0
COUGH: 0

## 2021-12-16 NOTE — PROGRESS NOTES
1000mcg of B12 given in (L) deltoid from office stock. Pt tolerated well. Kayce Gonzalez 46 #[id number] LOT #[id number] EXP 9/22.

## 2021-12-16 NOTE — PROGRESS NOTES
Chief Complaint   Patient presents with    Leg Pain     Patient complains of (B) leg pain that starts at the hip that goes all the way down to her feet that started 2 weeks ago and is getting worse. She says the left leg is worse.  Leg Swelling     She says she has swelling in both legs, but more swelling on the left. She noticed a vein popping out from her (L) hamstring area a few days ago. HPI: Yusuf Srivastava is a 66 y.o. female is here for relation of bilateral lower extremity swelling. She states that her legs been swelling for quite some time. She cannot tell me exactly how long they have been swelling for. She states that her mother and her sister have been advising her to call for the lower extremity edema several weeks ago, but she failed to do so. She states that she only decided to call yesterday after she was getting her diabetic shoes and went to the pharmacy and the lady who is examining her for the shoes that her leg appear to be swollen. Her right leg is swollen more than her left. She has a significant history of medical noncompliance with her medications. She states that she has not had her Coumadin in a few days. She states that her last INR was 4.4. She also has not had her blood pressure medicine today. She does complain of calf pain. The pain runs from her calf all the way up into her hamstring muscle. She denies any complaints of chest pain, chest pressure or shortness of breath.     Past Medical History:   Diagnosis Date    Abdominal pain     Abnormal EKG     Acute sinusitis     Allergic reaction to spider bite     Anemia     Anticoagulated     Anxiety     Arrhythmia     Asthmatic bronchitis without complication 3/02/7112    Ataxic gait     Lyons's esophagus     Bowel obstruction (HCC)     Burn of abdomen wall, second degree, initial encounter 8/27/2018    Callus     Cardiac pacemaker     CKD (chronic kidney disease) stage 2, GFR 60-89 ml/min     Coat's syndrome     Coat's syndrome     both eyes    Depression     Diabetes mellitus type 2 in nonobese (HCC)     Diabetic nephropathy (HCC)     Disequilibrium     Dizziness     DVT (deep venous thrombosis) (HCC)     Exudative retinopathy     Fibromyalgia     Fibromyositis     Gastric ulcer     GERD (gastroesophageal reflux disease)     History of gastric bypass     Hx of lupus anticoagulant disorder     Hyperlipidemia 5/6/2019    Hypertension     Hypothyroidism     Intermittent claudication (HCC)     Intestinal obstruction (HCC)     Iron deficiency     Left-sided weakness     Low vitamin D level     Lupus (HCC)     Menopause     Obesity     Osteoarthritis     Osteoporosis     Other iron deficiency anemias     Palliative care patient 09/24/2020    Pernicious anemia     PUPP (pruritic urticarial papules and plaques of pregnancy)     Restless legs syndrome (RLS) 7/11/2019    Right leg numbness     Right sided sciatica     Sarcoidosis     with liver involvement    Sciatica     Secondary hyperparathyroidism ([address]     Shingles     Shortness of breath     Sleep apnea     Stomach ulcer     Syncope     Visual loss, one eye       Past Surgical History:   Procedure Laterality Date    APPENDECTOMY      CARDIAC [facility]ATION  [facility]21[facility]    EF over 60%    CHOLECYSTECTOMY      COLONOSCOPY  02/2010    negative    COLONOSCOPY  02/22/2010    Dr Malave Single    COLONOSCOPY  04/01/2016    Dr LAKHWINDER Horvath-internal hemorrhoids, 5 yr recall    COLONOSCOPY N/A 05/07/2021    Dr Angus Jama looping/tortuosity throughout the left colon, BE=Normal    DILATATION, ESOPHAGUS      EYE SURGERY      Cyst on Right eye    EYE SURGERY      GASTRIC BYPASS SURGERY      GASTRIC BYPASS SURGERY      HERNIA REPAIR      HYSTERECTOMY      Complete    HYSTERECTOMY      Partial - because had a tubal pregnancy.      INCONTINENCE SURGERY      Bladder Sling    INFECTED SKIN DEBRIDEMENT Right     dog bite right forearm    OTHER SURGICAL HISTORY      IVC filter    PACEMAKER INSERTION      PACEMAKER PLACEMENT      [employer]    OR TOTAL KNEE ARTHROPLASTY Right 03/26/2018    TOTAL KNEE REPLACEMENT COMPLEX PRIMARY performed by Roosevelt Ibrahim MD at [address] [facility] SMALL INTESTINE SURGERY      SPLENECTOMY      KRISTEL AND BSO      TONSILLECTOMY AND ADENOIDECTOMY      TOTAL KNEE ARTHROPLASTY      UPPER GASTROINTESTINAL ENDOSCOPY  12/2011    gerd s/p gastric bypass    UPPER GASTROINTESTINAL ENDOSCOPY  02/2014    normal s/p gastric bypass    UPPER GASTROINTESTINAL ENDOSCOPY  02/2010    biopsy neg Barretts, chronic reflux esophagitis s/p gastric bypass    UPPER GASTROINTESTINAL ENDOSCOPY  07/2006    unremarkable s/p gastric bypass    UPPER GASTROINTESTINAL ENDOSCOPY  08/10/2015    Dr Brandt Bailon UPPER GASTROINTESTINAL ENDOSCOPY N/A 04/01/2016    Dr. Michael Luong:  H Pylori(-), HH, o/w normal    UPPER GASTROINTESTINAL ENDOSCOPY N/A 05/07/2021    Dr Rangel Pastures hiatal hernia, previous gastric bypass surgery, gastritis    VENA CAVA FILTER PLACEMENT Right 2003      Social History     Socioeconomic History    Marital status:      Spouse name: None    Number of children: 1    Years of education: None    Highest education level: None   Occupational History     Employer: [employer]    Tobacco Use    Smoking status: Never Smoker    Smokeless tobacco: Never Used   Vaping Use    Vaping Use: Never used   Substance and Sexual Activity    Alcohol use: No    Drug use: No    Sexual activity: Not Currently   Other Topics Concern    None   Social History Narrative    Lives with daughter, son-in-law, granddaughter, niece. Drives very little, daughter usually transports pt. Works part time at [employer]. Has pet dog.      Social Determinants of Health     Financial Resource Strain: Low Risk     Difficulty of Paying Living Expenses: Not very hard   Food Insecurity: No Food Insecurity  Worried About Running Out of Food in the Last Year: Never true    Cher of Food in the Last Year: Never true   Transportation Needs: No Transportation Needs    Lack of Transportation (Medical): No    Lack of Transportation (Non-Medical): No   Physical Activity: Inactive    Days of Exercise per Week: 0 days    Minutes of Exercise per Session: 0 min   Stress: No Stress Concern Present    Feeling of Stress : Only a little   Social Connections: Moderately Integrated    Frequency of Communication with Friends and Family: More than three times a week    Frequency of Social Gatherings with Friends and Family: More than three times a week    Attends Orthodox Services: More than 4 times per year    Active Member of [address] [place] [address] [employer] or Organizations:  Yes    Attends Club or Organization Meetings: More than 4 times per year    Marital Status:    Intimate Partner Violence:     Fear of Current or Ex-Partner: Not on file    Emotionally Abused: Not on file    Physically Abused: Not on file    Sexually Abused: Not on file   Housing Stability:     Unable to Pay for Housing in the Last Year: Not on file    Number of [place] in the Last Year: Not on file    Unstable Housing in the Last Year: Not on file      Family History   Problem Relation Age of Onset    Uterine Cancer Mother     Cervical Cancer Mother     Coronary Art Dis Mother     Heart Disease Father     Lung Cancer Father     Other Father         renal failure    Colon Cancer Brother     Colon Polyps Brother     Uterine Cancer Maternal Grandmother     Cervical Cancer Maternal Grandmother     Diabetes Maternal Grandmother     Cervical Cancer Sister     Other Sister         fibromyalgia    Diabetes Paternal Aunt     Breast Cancer Maternal Cousin     Esophageal Cancer Neg Hx     Liver Cancer Neg Hx     Liver Disease Neg Hx     Stomach Cancer Neg Hx     Rectal Cancer Neg Hx         Current Outpatient Medications   Medication Sig Dispense Refill    rosuvastatin (CRESTOR) 5 MG tablet Take 1 tablet by mouth daily 30 tablet 5    warfarin (COUMADIN) 7.5 MG tablet 7.5mg on Monday/Wed/Fri and 15mg all other days 180 tablet 3    escitalopram (LEXAPRO) 20 MG tablet Take 1 tablet by mouth daily 90 tablet 3    sucralfate (CARAFATE) 1 GM tablet Take 1 tablet by mouth 4 times daily 120 tablet 3    pantoprazole (PROTONIX) 40 MG tablet TAKE 1 TABLET BY MOUTH TWICE DAILY BEFORE MEAL(S) 180 tablet 0    CPAP Machine MISC Inhale 1 each into the lungs nightly      Cyanocobalamin 1000 MCG/ML KIT Inject as directed every 30 days      ferrous gluconate (FERGON) 240 (27 Fe) MG tablet Take 1 tablet by mouth 2 times daily 180 tablet 2    calcitRIOL (ROCALTROL) 0.25 MCG capsule Take 0.25 mcg by mouth daily      bumetanide (BUMEX) 2 MG tablet Take 1 tablet by mouth daily 30 tablet 5    levothyroxine (SYNTHROID) 100 MCG tablet Take 1 tablet by mouth Daily 90 tablet 3    lisinopril (PRINIVIL;ZESTRIL) 40 MG tablet Take 1 tablet by mouth once daily 90 tablet 1    baclofen (LIORESAL) 10 MG tablet Take 1 tablet by mouth 3 times daily As needed for hip pain 10 tablet 1    fexofenadine (ALLEGRA) 180 MG tablet Take 1 tablet by mouth daily Indications: Allergic Rhinitis 90 tablet 3    tiotropium (SPIRIVA RESPIMAT) 2.5 MCG/ACT AERS inhaler Inhale 2 puffs into the lungs daily 1 Inhaler 5    potassium chloride (KLOR-CON) 10 MEQ extended release tablet Take 10 mEq by mouth daily       calcipotriene (DOVONEX) 0.005 % cream Use topically as needed 3 Tube 3    clobetasol (TEMOVATE) 0.05 % cream Apply topically 2 times daily.  3 Tube 3    ondansetron (ZOFRAN) 4 MG tablet Take 1 tablet by mouth daily as needed for Nausea or Vomiting 30 tablet 0    Multiple Vitamins-Minerals (CENTRUM ADULTS) TABS Take 1 tablet by mouth daily      aspirin 81 MG tablet Take 81 mg by mouth daily      montelukast (SINGULAIR) 10 MG tablet Take 1 tablet by mouth daily for 10 days 10 tablet 0    gabapentin (NEURONTIN) 100 MG capsule TAKE 1 CAPSULE BY MOUTH THREE TIMES DAILY 270 capsule 0     Current Facility-Administered Medications   Medication Dose Route Frequency Provider Last Rate Last Admin    cyanocobalamin injection 1,000 mcg  1,000 mcg IntraMUSCular Once Katie Yu MD            Patient Active Problem List   Diagnosis    Gastroesophageal reflux disease    History of gastric bypass    Family history of colon cancer    Fibromyalgia    Encounter for current long-term use of anticoagulants    Primary osteoarthritis of right knee    Arthritis of knee    Essential hypertension    Hyperglycemia    Complex sleep apnea syndrome    Iron deficiency anemia    Restrictive airway disease    DVT, lower extremity, proximal, acute, unspecified laterality ([address]    History of ulcer disease    Anticoagulated on Coumadin    Postmenopausal osteoporosis    Diabetes mellitus ([address]    Pacemaker    History of DVT (deep vein thrombosis)    Lupus anticoagulant disorder (HCC)    History of pulmonary embolism    Thrombocytopenia (HCC)    Hypothyroidism    Morbid obesity due to excess calories (HCC)    Asthmatic bronchitis without complication    Gastroenteritis    Colic    Abdominal pain    Non-intractable vomiting with nausea    Severe episode of recurrent major depressive disorder, without psychotic features ([address]    Lower abdominal pain    Chronic heartburn    S/P gastric bypass    Dog bite    Cellulitis of right upper extremity    Abscess of right arm    Soft tissue infection    Skin ulcer of upper arm, with fat layer exposed ([address]    History of Ggae-en-Y gastric bypass    Hyperlipidemia    Hypersomnia    Nonrheumatic mitral valve regurgitation    Obesity, Class II, BMI 35-39.9    Peripheral edema    Restless legs syndrome (RLS)    Vitamin D deficiency    Chronic deep vein thrombosis (DVT) of proximal vein of lower extremity (HCC)    Cough        Review of Systems Constitutional: Positive for fatigue. Negative for activity change, appetite change, chills, diaphoresis, fever and unexpected weight change. HENT: Negative for congestion, ear pain, hearing loss, nosebleeds, postnasal drip, rhinorrhea, sinus pressure, sinus pain, sneezing, sore throat, tinnitus, trouble swallowing and voice change. Eyes: Negative for discharge and itching. Watery eyes   Respiratory: Negative for apnea, cough, chest tightness, shortness of breath, wheezing and stridor. Cardiovascular: Positive for leg swelling. Negative for chest pain and palpitations. Left leg swelling; chronic secondary to DVT. Gastrointestinal: Negative for abdominal distention, abdominal pain, blood in stool, constipation, diarrhea, nausea and vomiting. Endocrine: Negative for cold intolerance, heat intolerance, polydipsia, polyphagia and polyuria. Genitourinary: Negative for difficulty urinating, dysuria, flank pain, frequency, hematuria and urgency. Musculoskeletal: Positive for arthralgias and back pain. Negative for gait problem, joint swelling, myalgias, neck pain and neck stiffness. She had bilateral knee pain. Right hip pain with some right-sided back pain radiating to her abdomen. Skin: Negative for color change, pallor, rash and wound. Allergic/Immunologic: Positive for environmental allergies. Negative for food allergies and immunocompromised state. Neurological: Negative for dizziness, tremors, seizures, syncope, facial asymmetry, speech difficulty, weakness, light-headedness, numbness and headaches. Hematological: Negative for adenopathy. Does not bruise/bleed easily. Psychiatric/Behavioral: Positive for dysphoric mood. Negative for agitation, confusion, decreased concentration and hallucinations. The patient is not nervous/anxious and is not hyperactive.          Mood has improved       BP (!) 168/110 (Site: Left Upper Arm, Position: Sitting)   Pulse 82   Resp 20 Ht 5' 7\" (1.702 m)   Wt 243 lb 3.2 oz (110.3 kg)   SpO2 93%   BMI 38.09 kg/m²   Physical Exam  Vitals and nursing note reviewed. Constitutional:       General: She is not in acute distress. Appearance: Normal appearance. She is well-developed. She is obese. She is not ill-appearing, toxic-appearing or diaphoretic. HENT:      Head: Normocephalic and atraumatic. Right Ear: Tympanic membrane, ear canal and external ear normal. There is no impacted cerumen. Left Ear: Tympanic membrane, ear canal and external ear normal.      Nose: Nose normal. No congestion or rhinorrhea. Mouth/Throat:      Mouth: Mucous membranes are moist.      Pharynx: Oropharynx is clear. No oropharyngeal exudate or posterior oropharyngeal erythema. Eyes:      General: No scleral icterus. Right eye: No discharge. Left eye: No discharge. Extraocular Movements: Extraocular movements intact. Conjunctiva/sclera: Conjunctivae normal.      Pupils: Pupils are equal, round, and reactive to light. Neck:      Thyroid: No thyromegaly. Vascular: No carotid bruit or JVD. Trachea: No tracheal deviation. Cardiovascular:      Rate and Rhythm: Normal rate and regular rhythm. Pulses: Normal pulses. Heart sounds: Normal heart sounds. No murmur heard. No friction rub. No gallop. Pulmonary:      Effort: Pulmonary effort is normal. No respiratory distress. Breath sounds: Normal breath sounds. No stridor. No wheezing, rhonchi or rales. Chest:      Chest wall: No tenderness. Abdominal:      General: Abdomen is flat. Bowel sounds are normal. There is no distension. Palpations: Abdomen is soft. There is no mass. Tenderness: There is no abdominal tenderness. There is no right CVA tenderness, left CVA tenderness, guarding or rebound. Hernia: No hernia is present. Musculoskeletal:         General: Tenderness present. No swelling, deformity or signs of injury.  Normal range of motion. Cervical back: Normal range of motion. No rigidity. No muscular tenderness. Lumbar back: Spasms present. No swelling or bony tenderness. Normal range of motion. Back:       Right lower leg: No edema. Left lower leg: No edema. Comments:  Does have some hip pain on palpation of the left hip she also has calf pain and tenderness on palpation of the right calf. She does have some edema. There is no erythema noted. Visual inspection:  Deformity/amputation: absent  Skin lesions/pre-ulcerative calluses: present to bottom of the left foot  Edema: right- negative, left- negative    Sensory exam:  Monofilament sensation: normal  (minimum of 5 random plantar locations tested, avoiding callused areas - > 1 area with absence of sensation is + for neuropathy)    Plus at least one of the following:  Pulses: normal,   Pinprick: Intact  Proprioception: N/A  Vibration (128 Hz): N/A   Lymphadenopathy:      Cervical: No cervical adenopathy. Skin:     General: Skin is warm and dry. Capillary Refill: Capillary refill takes less than 2 seconds. Coloration: Skin is not jaundiced or pale. Findings: No bruising, erythema, lesion or rash. Neurological:      General: No focal deficit present. Mental Status: She is alert and oriented to person, place, and time. Mental status is at baseline. Cranial Nerves: No cranial nerve deficit. Sensory: No sensory deficit. Motor: No weakness or abnormal muscle tone. Coordination: Coordination normal.      Gait: Gait normal.      Deep Tendon Reflexes: Reflexes normal.   Psychiatric:         Mood and Affect: Mood normal. Mood is not anxious or depressed. Behavior: Behavior normal.         Thought Content: Thought content normal.         Judgment: Judgment normal.         1. Bilateral lower extremity edema    2. Right calf pain    3. Primary hypertension    4.  Hypercoagulable state ([address] ASSESSMENT/PLAN:    43-year-old woman here for follow-up    1. Calf pain and swelling: Concern for possible DVT. Patient has a hypercoagulable disorder. She is not compliant with her medications. We will check an ultrasound today to rule out a DVT. Also check a CBC CMP and INR and pro time today    2. Hypertension: Patient admits to not taking her blood pressure medication today    3. Hypercoagulable state: We will check an INR        Evern Nearing was seen today for leg pain and leg swelling. Diagnoses and all orders for this visit:    Bilateral lower extremity edema  -     VL EXTREMITY VENOUS BILATERAL; Future  -     CBC Auto Differential; Future  -     Comprehensive Metabolic Panel; Future  -     Protime-INR; Future    Right calf pain    Primary hypertension    Hypercoagulable state ([facility] [address]          No follow-ups on file. Orders Placed This Encounter   Procedures    VL EXTREMITY VENOUS BILATERAL     Standing Status:   Future     Standing Expiration Date:   12/16/2022     Order Specific Question:   Upper or Lower Extremity? Answer:   Lower Extremity     Order Specific Question:   Reason for exam:     Answer:   bilateral leg swelling, right worse than left. history of hypercoagulability and DVT    CBC Auto Differential     Standing Status:   Future     Standing Expiration Date:   12/16/2022    Comprehensive Metabolic Panel     Fasting 12 hours     Standing Status:   Future     Standing Expiration Date:   12/16/2022    Protime-INR     Standing Status:   Future     Standing Expiration Date:   12/16/2022     Order Specific Question:   Daily Coumadin Dose?      Answer:   MEERA Barron MD

## 2021-12-16 NOTE — PROGRESS NOTES
HOME MONITORING REPORT    INR today:   Results for orders placed or performed in visit on 12/16/21   Protime-INR   Result Value Ref Range    INR 2.19        INR Goal: 2.0-3.0    Dosing Plan  As of 12/16/2021    TTR:  25.3 % (3.6 y)   Full warfarin instructions:  7.5 mg every Mon, Wed, Fri; 15 mg all other days              PLAN: Advised patient/caregiver to continue current dose of 7.5 mg  M,W,F and 15 mg all other days. Patient/Caregiver voiced understanding      Electronically signed by Curtis Samuels MD on 12/16/2021 at 3:07 PM    I have reviewed nursing plan for Coumadin management and agree with plan.

## 2021-12-17 ENCOUNTER — HOSPITAL ENCOUNTER (OUTPATIENT)
Dept: VASCULAR LAB | Age: 71
Discharge: HOME | End: 2021-12-17
Payer: MEDICARE

## 2021-12-17 ENCOUNTER — PATIENT MESSAGE (OUTPATIENT)
Dept: INTERNAL MEDICINE | Age: 71
End: 2021-12-17

## 2021-12-17 ENCOUNTER — ANTI-COAG VISIT (OUTPATIENT)
Dept: INTERNAL MEDICINE | Age: 71
End: 2021-12-17
Payer: MEDICARE

## 2021-12-17 DIAGNOSIS — M79.605 PAIN OF LEFT LOWER EXTREMITY: Primary | ICD-10-CM

## 2021-12-17 DIAGNOSIS — Z79.01 ANTICOAGULATED ON COUMADIN: Primary | ICD-10-CM

## 2021-12-17 DIAGNOSIS — R60.0 BILATERAL LOWER EXTREMITY EDEMA: ICD-10-CM

## 2021-12-17 PROCEDURE — 93793 ANTICOAG MGMT PT WARFARIN: CPT | Performed by: INTERNAL MEDICINE

## 2021-12-17 PROCEDURE — 93970 EXTREMITY STUDY: CPT

## 2021-12-20 ENCOUNTER — TELEPHONE (OUTPATIENT)
Dept: INTERNAL MEDICINE | Age: 71
End: 2021-12-20

## 2021-12-20 RX ORDER — METHYLPREDNISOLONE 4 MG/1
TABLET ORAL
Qty: 1 KIT | Refills: 0 | Status: SHIPPED | OUTPATIENT
Start: 2021-12-20 | End: 2021-12-26

## 2021-12-20 NOTE — TELEPHONE ENCOUNTER
I called downstairs and was given the #[id number] to order with instructions to put in the comments CT Left Lower extremity. This order only comes up without contrast. Please advise if that is ok.

## 2021-12-20 NOTE — TELEPHONE ENCOUNTER
From: Juan Larios  To: Dr. Maria Guadalupe Sharp: 12/17/2021 5:46 PM CST  Subject: Severe pain in left leg    There is something wrong my left leg is killing me. It's the back part of my leg and it pains me when I sit or if I am in bed down to my ankle. I know that something is the matter.

## 2021-12-20 NOTE — TELEPHONE ENCOUNTER
----- Message from Regan Hudson sent at 12/17/2021  5:23 PM CST -----  Subject: Message to Provider    QUESTIONS  Information for Provider? Pt states someone from the office just called   her to give her the results from her vascular study. ---------------------------------------------------------------------------  --------------  Marilee Marissa INFO  What is the best way for the office to contact you? OK to leave message on   voicemail  Preferred Call Back Phone Number? [phone]  ---------------------------------------------------------------------------  --------------  SCRIPT ANSWERS  Relationship to Patient?  Self

## 2021-12-21 ENCOUNTER — HOSPITAL ENCOUNTER (OUTPATIENT)
Dept: CT IMAGING | Age: 71
Discharge: HOME | End: 2021-12-21
Payer: MEDICARE

## 2021-12-21 DIAGNOSIS — M79.605 PAIN OF LEFT LOWER EXTREMITY: ICD-10-CM

## 2021-12-21 DIAGNOSIS — R60.0 BILATERAL LOWER EXTREMITY EDEMA: ICD-10-CM

## 2021-12-21 PROCEDURE — 73700 CT LOWER EXTREMITY W/O DYE: CPT

## 2021-12-23 LAB — INR BLD: 3.2

## 2021-12-27 ENCOUNTER — ANTI-COAG VISIT (OUTPATIENT)
Dept: INTERNAL MEDICINE | Age: 71
End: 2021-12-27
Payer: MEDICARE

## 2021-12-27 DIAGNOSIS — Z79.01 ANTICOAGULATED ON COUMADIN: Primary | ICD-10-CM

## 2021-12-27 PROCEDURE — 93793 ANTICOAG MGMT PT WARFARIN: CPT | Performed by: INTERNAL MEDICINE

## 2021-12-27 NOTE — PROGRESS NOTES
HOME MONITORING REPORT    INR today:   Results for orders placed or performed in visit on 12/27/21   Protime-INR   Result Value Ref Range    INR 3.2        INR Goal: 2.0-3.0    Dosing Plan  As of 12/27/2021    TTR:  25.6 % (3.6 y)   Full warfarin instructions:  12/27: Hold; Otherwise 7.5 mg every Mon, Wed, Fri; 15 mg all other days              PLAN: Advised patient/caregiver to [employer], resume current dose tomorrow, and recheck in one week. Patient/Caregiver voiced understanding    Electronically signed by Asuncion Burris MD on 12/27/2021 at 9:44 AM    I have reviewed nursing plan for Coumadin management and agree with plan.

## 2021-12-28 NOTE — PROGRESS NOTES
Progress Note      Pt Name: Jose Francisco Mcclain: 1950  MRN: [id number]    Date of evaluation: 01/04/2022  History Obtained From:  patient, electronic medical record    CHIEF COMPLAINT:    Chief Complaint   Patient presents with    Follow-up     Iron deficiency anemia, unspecified iron deficiency anemia type    Other     Chronic thrombocytopenia     HISTORY OF PRESENT ILLNESS:    Derrell Lau is a 66 y.o.  female with significant PMH recurrent DVT, thrombocytopenia and iron deficiency anemia. [facility] continues on long-term anticoagulation with Coumadin and receives B12 injections under the management of Dr. Jovanna Nguyễn. Recommendation has been to take ferrous gluconate 240 mg p.o. twice daily. [facility] returns today in scheduled follow-up for evaluation, lab monitoring, side effect monitoring and further treatment recommendations. [facility] presents today indicating that she is compliant with her ferrous gluconate. She reports currently having a lot of pain in her left knee, scans per Karen Dumont MD. She has been referred to orthopedic MD and is seeing Dr Valeri Narvaez on 01/25/2022. she has been evaluated with vascular studies and CT     CBC today reveals a significant improvement of hemoglobin 12.6 compared to 10.7       HEMATOLOGY HISTORY:   Diagnosis:  Recurrent DVT LLE with history of pulmonary emboli   Iron deficiency anemia with a history of gastric bypass surgery     Treatment summary:  Warfarin 7.5 mg daily, managed by Dr. Jovanna Nguyễn? Venofer for a total of 1000 mg completed on 7/29/2018   Ferrous sulfate 325 mg PO twice a day , 8/15/2018-May 2020  Ferrous gluconate 240 mg p.o. 3 times daily initiated in May 2020, currently taking twice daily    Hematology history #1- Recurrent DVT and history of pulmonary emboli  [facility] was seen in initial consultation on 7/26/2018 during her hospitalization at [facility] for recurrent DVT and anemia.  She has a history of pulmonary emboli and DVTand has been followed in the past by Dr. Elvi Thurman. Serology studies in September 2007 documented ANÍBAL negative and factor II analysis prothrombin gene mutation was negative. She has an IVC filter in place. Barbara Madden presented to the ER at [facility] on 7/24/2018 with complaints of significant left lower extremity edema and pain that had been persistent for approximately one week prior to presentation. Barbara Madden has been on chronic anticoagulation with warfarin since 2003 and was subtherapeutic at presentation with an INR of 1.27 on 7/24/2018. Review of INRs available through Epic dating back to 1/31/2018 to 7/24/2018 indicated Barbara Madden has remained routinely subtherapeutic. She reported financial restraint and was unable to obtain warfarin. During her hospitalization, she was placed on a heparin drip and bridged with warfarin. LOWER EXTREMITY BILATERAL VENOUS DUPLEX On 7/25/2018 - chronic deep vein thrombosis in the right lower extremity involving the femoral and popliteal, veins. Lupus anticoagulant not detected on 7/26/2018. Recurrent DVT to Left lower extremity occurred most likely due to subtherapeutic INR related to noncompliance, do not suspect warfarin failure. Barbara Madden indicates that she monitors her PT/INR at home and calls Dr. Lauren De Jesus office with the results for dosing changes related to her warfarin. Hematology history #2- Iron deficiency anemia  Barbara Madden has a history of iron deficiency anemia dating back to  1993. Her deficiency may also be exacerbated by absorption, she had aRouxen-Y gastric bypass surgery 9/12/2003. She has received IV iron replacement with Venofer on several occasions under the direction of Dr. Kiara Cardozo. Barbara Madden had a bone marrow aspiration and biopsy was completed on 8/31/2007 documented left shifted myeloid maturation and dysmaturation. 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 Normocellular bone marrow for age. Decreased storage iron and no ringed sideroblasts.  Moderate increase of reticulin pb Lujan had an upper endoscopy on 8/10/2015 by Dr. Sg Gandhi that identified a normal postoperative Gage en Y gastric bypass anatomy. A marginal ulcer with no bleeding tendencies noted. Serology studies on 7/26/2018:  Iron 41   Iron saturation 13%   Ferritin 97.4   TIBC 316   Reticulocyte count 0.0521      Haptoglobin <10   Hemoglobin 8.7 and hematocrit 27.8    Leny received Venofer for a total of 1000 mg, completed on 7/29/2018. Serology studies on 5/6/2019, Obtained by Dr. Peng Hopkins:  Iron 54   B12 876   Folate 7.61   Copper 135     Serology studies on 11/22/2019:  · Iron 74  · Iron saturation 20.1%  · TIBC is 368  · Ferritin 88  · Folate 16  · Hemoglobin 11.4/MCV 92.0    Serology studies on 10/28/2020  · Ferritin 259  · Iron 45  · TIBC 248  · Vitamin B12 796    Serology studies on 2/25/2021  · Iron 53  · TIBC 314  · Iron saturation 17  · Ferritin 81.7  · Vitamin B-12:506   · Folate 9.7    Serology studies on 3/11/2021  · Vitamin B-12:  806  · Vitamin D: 25.4    4/13/2021 CT Abdomen/Pelvis without documented reported as stable with no change from previous scan. No acute abnormality of the abdomen or pelvis. Evidence of previous gastric bypass surgery. Moderate thickening of the stomach in the area of the surgery is probably due to incomplete distention. However possibility of an inflammatory or neoplastic process may not be excluded. Serology studies on 4/29/2021  · Sed rate 53  · CRP 5.92    5/7/2021  EGD/Colon at [facility]: Path revealed A. Stomach, random gastric biopsies: 1. Benign gastric mucosa with minimal chronic inflammation. 2. No Helicobacter pylori organisms identified by immunohistochemical stain. B. Colon, random colonic biopsies: Benign colonic mucosa with no significant histopathologic changes.      5/7/2021 Barium enema due to incomplete colonoscopy: tortuous colon: No obvious mucosal lesion, mass effect or colonic stricture is identified, there is somewhat limited visualization of the splenic flexure due to overlapping bowel loops. Scattered sigmoid diverticula are present.      2021 Bilateral mammograms was reported as no mammographic evidence of malignancy    Serology studies on 2021  · Iron 40  · TIBC 297  · Saturation 13%  · Ferritin 105.3  · Folate 10.2    Age Appropriate Health Screenin2020 Bone Density- osteopenia and increased fracture risk; lumbar T-score -1.5, hips T-score -1.9    Past Medical History:    Past Medical History:   Diagnosis Date    Abdominal pain     Abnormal EKG     Acute sinusitis     Allergic reaction to spider bite     Anemia     Anticoagulated     Anxiety     Arrhythmia     Asthmatic bronchitis without complication     Ataxic gait     Lyons's esophagus     Bowel obstruction (HCC)     Burn of abdomen wall, second degree, initial encounter 2018    Callus     Cardiac pacemaker     CKD (chronic kidney disease) stage 2, GFR 60-89 ml/min     Coat's syndrome     Coat's syndrome     both eyes    Depression     Diabetes mellitus type 2 in nonobese (HCC)     Diabetic nephropathy (HCC)     Disequilibrium     Dizziness     DVT (deep venous thrombosis) (HCC)     Exudative retinopathy     Fibromyalgia     Fibromyositis     Gastric ulcer     GERD (gastroesophageal reflux disease)     History of gastric bypass     Hx of lupus anticoagulant disorder     Hyperlipidemia 2019    Hypertension     Hypothyroidism     Intermittent claudication (HCC)     Intestinal obstruction (HCC)     Iron deficiency     Left-sided weakness     Low vitamin D level     Lupus (HCC)     Menopause     Obesity     Osteoarthritis     Osteoporosis     Other iron deficiency anemias     Palliative care patient 2020    Pernicious anemia     PUPP (pruritic urticarial papules and plaques of pregnancy)     Restless legs syndrome (RLS) 2019    Right leg numbness     Right sided sciatica     Sarcoidosis     with liver involvement    Sciatica     Secondary hyperparathyroidism ([facility] [address]     Shingles     Shortness of breath     Sleep apnea     Stomach ulcer     Syncope     Visual loss, one eye        Past Surgical History:    Past Surgical History:   Procedure Laterality Date    APPENDECTOMY      CARDIAC CATHETERIZATION  10/21/13  [address]    EF over 60%    CHOLECYSTECTOMY      COLONOSCOPY  02/2010    negative    COLONOSCOPY  02/22/2010    Dr Clyda Schaumann    COLONOSCOPY  04/01/2016    Dr LAKHWINDER Horvath-internal hemorrhoids, 5 yr recall    COLONOSCOPY N/A 05/07/2021    Dr Romero Sickle looping/tortuosity throughout the left colon, BE=Normal    DILATATION, ESOPHAGUS      EYE SURGERY      Cyst on Right eye    EYE SURGERY      GASTRIC BYPASS SURGERY      GASTRIC BYPASS SURGERY      HERNIA REPAIR      HYSTERECTOMY      Complete    HYSTERECTOMY      Partial - because had a tubal pregnancy.      INCONTINENCE SURGERY      Bladder Sling    INFECTED SKIN DEBRIDEMENT Right     dog bite right forearm    OTHER SURGICAL HISTORY      IVC filter    PACEMAKER INSERTION      PACEMAKER PLACEMENT      medtronic    PA TOTAL KNEE ARTHROPLASTY Right 03/26/2018    TOTAL KNEE REPLACEMENT COMPLEX PRIMARY performed by Gumaro Estrada MD at [facility] [address] [facility] SMALL INTESTINE SURGERY      SPLENECTOMY      KRISTEL AND BSO      TONSILLECTOMY AND ADENOIDECTOMY      TOTAL KNEE ARTHROPLASTY      UPPER GASTROINTESTINAL ENDOSCOPY  12/2011    gerd s/p gastric bypass    UPPER GASTROINTESTINAL ENDOSCOPY  02/2014    normal s/p gastric bypass    UPPER GASTROINTESTINAL ENDOSCOPY  02/2010    biopsy neg Barretts, chronic reflux esophagitis s/p gastric bypass    UPPER GASTROINTESTINAL ENDOSCOPY  07/2006    unremarkable s/p gastric bypass    UPPER GASTROINTESTINAL ENDOSCOPY  08/10/2015    Dr Jenny Chiu UPPER GASTROINTESTINAL ENDOSCOPY N/A 04/01/2016    Dr. Juarez Olga:  H Pylori(-), HH, o/w normal    UPPER GASTROINTESTINAL ENDOSCOPY N/A 05/07/2021    Dr Garvin Primus hiatal hernia, previous gastric bypass surgery, gastritis    VENA CAVA FILTER PLACEMENT Right 2003       Current Medications:    Current Outpatient Medications   Medication Sig Dispense Refill    pantoprazole (PROTONIX) 40 MG tablet TAKE 1 TABLET BY MOUTH TWICE DAILY BEFORE MEAL(S) 180 tablet 1    montelukast (SINGULAIR) 10 MG tablet Take 1 tablet by mouth daily for 10 days 10 tablet 0    rosuvastatin (CRESTOR) 5 MG tablet Take 1 tablet by mouth daily 30 tablet 5    warfarin (COUMADIN) 7.5 MG tablet 7.5mg on Monday/Wed/Fri and 15mg all other days 180 tablet 3    escitalopram (LEXAPRO) 20 MG tablet Take 1 tablet by mouth daily 90 tablet 3    sucralfate (CARAFATE) 1 GM tablet Take 1 tablet by mouth 4 times daily 120 tablet 3    CPAP Machine MISC Inhale 1 each into the lungs nightly      Cyanocobalamin 1000 MCG/ML KIT Inject as directed every 30 days      gabapentin (NEURONTIN) 100 MG capsule TAKE 1 CAPSULE BY MOUTH THREE TIMES DAILY 270 capsule 0    ferrous gluconate (FERGON) 240 (27 Fe) MG tablet Take 1 tablet by mouth 2 times daily 180 tablet 2    calcitRIOL (ROCALTROL) 0.25 MCG capsule Take 0.25 mcg by mouth daily      bumetanide (BUMEX) 2 MG tablet Take 1 tablet by mouth daily 30 tablet 5    levothyroxine (SYNTHROID) 100 MCG tablet Take 1 tablet by mouth Daily 90 tablet 3    lisinopril (PRINIVIL;ZESTRIL) 40 MG tablet Take 1 tablet by mouth once daily 90 tablet 1    baclofen (LIORESAL) 10 MG tablet Take 1 tablet by mouth 3 times daily As needed for hip pain 10 tablet 1    fexofenadine (ALLEGRA) 180 MG tablet Take 1 tablet by mouth daily Indications:  Allergic Rhinitis 90 tablet 3    tiotropium (SPIRIVA RESPIMAT) 2.5 MCG/ACT AERS inhaler Inhale 2 puffs into the lungs daily 1 Inhaler 5    potassium chloride (KLOR-CON) 10 MEQ extended release tablet Take 10 mEq by mouth daily       calcipotriene (DOVONEX) 0.005 % cream Use topically as needed 3 Tube 3    clobetasol (TEMOVATE) 0.05 % cream Apply topically 2 times daily. 3 Tube 3    ondansetron (ZOFRAN) 4 MG tablet Take 1 tablet by mouth daily as needed for Nausea or Vomiting 30 tablet 0    Multiple Vitamins-Minerals (CENTRUM ADULTS) TABS Take 1 tablet by mouth daily      aspirin 81 MG tablet Take 81 mg by mouth daily       Current Facility-Administered Medications   Medication Dose Route Frequency Provider Last Rate Last Admin    cyanocobalamin injection 1,000 mcg  1,000 mcg IntraMUSCular Once Rj Faria MD            Allergies:    Allergies   Allergen Reactions    Insect Extract Allergy Skin Test Anaphylaxis     Bee stings    Lactose Intolerance (Gi)     Prednisone      Headache and upset stomach    Zanaflex [Tizanidine Hcl]      Passed out lost control of body functions    Lortab [Hydrocodone-Acetaminophen] Nausea And Vomiting     Sweats, weak, nausea and vomiting    Other Nausea And Vomiting     Opiates------sweating, weak        Oxycodone-Acetaminophen Nausea And Vomiting     Sweating and vomiting     Ultram [Tramadol Hcl] Nausea And Vomiting     Sweating, weak, nausea and vomiting         Social History:    Social History     Tobacco Use    Smoking status: Never Smoker    Smokeless tobacco: Never Used   Vaping Use    Vaping Use: Never used   Substance Use Topics    Alcohol use: No    Drug use: No       Family History:   Family History   Problem Relation Age of Onset    Uterine Cancer Mother     Cervical Cancer Mother     Coronary Art Dis Mother     Heart Disease Father     Lung Cancer Father     Other Father         renal failure    Colon Cancer Brother     Colon Polyps Brother     Uterine Cancer Maternal Grandmother     Cervical Cancer Maternal Grandmother     Diabetes Maternal Grandmother     Cervical Cancer Sister     Other Sister         fibromyalgia    Diabetes Paternal Aunt     Breast Cancer Maternal Cousin     Esophageal Cancer Neg Hx     Liver Cancer Neg Hx     Liver Disease Neg Hx     Stomach Cancer Neg Hx     Rectal Cancer Neg Hx        Vitals:  Vitals:    01/04/22 1143   BP: 120/60   Pulse: 96   SpO2: 98%   Weight: 242 lb (109.8 kg)   Height: 5' 7\" (1.702 m)        Subjective   REVIEW OF SYSTEMS:   Review of Systems   Constitutional: Positive for fatigue. Negative for chills, diaphoresis and fever. HENT: Negative. Negative for congestion, ear pain, hearing loss, nosebleeds, sore throat and tinnitus. Eyes: Negative. Negative for pain, discharge and redness. Respiratory: Negative. Negative for cough, shortness of breath and wheezing. Cardiovascular: Negative. Negative for chest pain, palpitations and leg swelling. Gastrointestinal: Negative. Negative for abdominal pain, blood in stool, constipation, diarrhea, nausea and vomiting. Endocrine: Negative for polydipsia. Genitourinary: Negative for dysuria, flank pain, frequency, hematuria and urgency. Musculoskeletal: Negative. Negative for back pain, myalgias and neck pain. Left knee pain   Skin: Negative. Negative for rash. Neurological: Negative. Negative for dizziness, tremors, seizures, weakness and headaches. Hematological: Does not bruise/bleed easily. Psychiatric/Behavioral: Negative. The patient is not nervous/anxious. Objective   PHYSICAL EXAM:  Physical Exam  Vitals reviewed. Constitutional:       General: She is not in acute distress. Appearance: She is well-developed. She is not diaphoretic. HENT:      Head: Normocephalic and atraumatic. Mouth/Throat:      Pharynx: Uvula midline. Tonsils: No tonsillar exudate. Eyes:      General: Lids are normal. No scleral icterus. Right eye: No discharge. Left eye: No discharge. Conjunctiva/sclera: Conjunctivae normal.      Pupils: Pupils are equal, round, and reactive to light. Neck:      Thyroid: No thyroid mass or thyromegaly. Vascular: No JVD.       Trachea: Trachea normal. No tracheal deviation. Cardiovascular:      Rate and Rhythm: Normal rate and regular rhythm. Heart sounds: Normal heart sounds. No murmur heard. No friction rub. No gallop. Pulmonary:      Effort: Pulmonary effort is normal. No respiratory distress. Breath sounds: Normal breath sounds. No wheezing or rales. Chest:      Chest wall: No tenderness. Abdominal:      General: Bowel sounds are normal. There is no distension. Palpations: Abdomen is soft. There is no mass. Tenderness: There is no abdominal tenderness. There is no guarding or rebound. Hernia: No hernia is present. Musculoskeletal:         General: No tenderness or deformity. Cervical back: Normal range of motion and neck supple. Right lower leg: Edema present. Left lower leg: Edema present. Comments: Range of motion within normal limits x4 extremities   Skin:     General: Skin is warm. Coloration: Skin is not pale. Findings: No erythema or rash. Neurological:      Mental Status: She is alert and oriented to person, place, and time. Cranial Nerves: No cranial nerve deficit. Coordination: Coordination normal.   Psychiatric:         Behavior: Behavior normal.         Thought Content: Thought content normal.         Labs:  Lab Results   Component Value Date    WBC 5.7 01/11/2022    HGB 12.5 01/11/2022    HCT 41.0 01/11/2022    MCV 93.8 01/11/2022     01/11/2022     Lab Results   Component Value Date    NEUTROABS 2.8 01/11/2022       ASSESSMENT/PLAN:      1. Iron deficiency anemia with concern for malabsorption, hemoglobin has improved to 12.6 compared to 10.7. Continues to take ferrous gluconate twice daily. Serology studies on 07/01/2021  · Iron 40  · TIBC 297  · Saturation 13%  · Ferritin 105.3  · Folate 10.2      Labs to be obtained today:  - Iron and TIBC; Future  - Ferritin; Future  - Vitamin B12; Future  - Comprehensive Metabolic Panel;  Future    -Continue iron supplement twice daily    2. Anticoagulated on Coumadin, due to history of recurrent DVTs and pulmonary emboli. Coumadin is currently managed by Dr. Bhavna Winter. -Continue chronic anticoagulation management under the direction of Dr. Bhavna Winter with a goal INR between 2-3     3. Chronic thrombocytopenia that waxes and wanes. Platelet count 64,727, has declined from 162,000, significant history of waxing and waning. Denies any excessive bruising or bleeding to include melena, epistaxis, hematuria or hematochezia. -Repeat CBC in 1 week  -Monitor closely for bleeding    4. History of hypothyroidism, on Synthroid 1000 mcg daily. Managed by Dr. Bhavna Winter. ,  TSH of 1.910 last on 3/11/2021    I discussed all of the above findings included in the assessment and plan with the patient and the patient is in agreement to move forward with current recommendations/treatment. I have addressed all of their questions and concerns that were verbalized. FOLLOW UP:  1. Follow-up appointment given CBC in 1 week and for 4 months, sooner if needed  2. Continue to follow closely with Dr. Bhavna Winter and other medical providers as recommended  3. Labs at next visit: Iron substrates, ferritin, vitamin B12, CMP and CBC    EMR Dragon/Transcription disclaimer:   Much of this encounter note is an electronic transcription/translation of spoken language to printed text. The electronic translation of spoken language may permit erroneous, or at times, nonsensical words or phrases to be inadvertently transcribed; although attempts have made to review the note for such errors, some may still exist.  Please excuse any unrecognized transcription errors and contact us if the air is unintelligible or needs documented correction. Also, portions of this note have been copied forward, however, changed to reflect the most current clinical status of this patient. ILibby, am pre-charting as a registered nurse for [employer], APRN.      Electronically signed by CHANELLE Cantrell on 1/16/2022 at 9:15 AM.

## 2021-12-30 ENCOUNTER — ANTI-COAG VISIT (OUTPATIENT)
Dept: PRIMARY CARE CLINIC | Age: 71
End: 2021-12-30
Payer: MEDICARE

## 2021-12-30 DIAGNOSIS — Z79.01 ANTICOAGULATED ON COUMADIN: Primary | ICD-10-CM

## 2021-12-30 DIAGNOSIS — D50.9 IRON DEFICIENCY ANEMIA, UNSPECIFIED IRON DEFICIENCY ANEMIA TYPE: Primary | ICD-10-CM

## 2021-12-30 LAB — INR BLD: 1.8

## 2021-12-30 PROCEDURE — 93793 ANTICOAG MGMT PT WARFARIN: CPT | Performed by: INTERNAL MEDICINE

## 2021-12-30 RX ORDER — PANTOPRAZOLE SODIUM 40 MG/1
TABLET, DELAYED RELEASE ORAL
Qty: 30 TABLET | Refills: 0 | Status: CANCELLED | OUTPATIENT
Start: 2021-12-30

## 2021-12-30 RX ORDER — PANTOPRAZOLE SODIUM 40 MG/1
TABLET, DELAYED RELEASE ORAL
Qty: 180 TABLET | Refills: 1 | Status: SHIPPED | OUTPATIENT
Start: 2021-12-30 | End: 2022-02-14 | Stop reason: SDUPTHER

## 2021-12-30 NOTE — TELEPHONE ENCOUNTER
Nestor Huff called to request a refill on her medication.       Last office visit : 12/16/2021   Next office visit : 1/18/2022     Requested Prescriptions     Pending Prescriptions Disp Refills    pantoprazole (PROTONIX) 40 MG tablet [Pharmacy Med Name: Pantoprazole Sodium 40 MG Oral Tablet Delayed Release] 180 tablet 1     Sig: TAKE 1 TABLET BY MOUTH TWICE DAILY BEFORE MEAL(S)            Ronda Mccurdy MA

## 2021-12-30 NOTE — PROGRESS NOTES
HOME MONITORING REPORT    INR today:   Results for orders placed or performed in visit on 12/30/21   Protime-INR   Result Value Ref Range    INR 1.80        INR Goal: 2.0-3.0    Dosing Plan  As of 12/30/2021    TTR:  25.9 % (3.6 y)   Full warfarin instructions:  7.5 mg every Mon, Wed, Fri; 15 mg all other days              PLAN: Advised patient/caregiver to continue current dose and recheck in one week. Patient/Caregiver voiced understanding      Electronically signed by Tere Nails MD on 12/30/2021 at 2:45 PM    I have reviewed nursing plan for Coumadin management and agree with plan.

## 2022-01-04 ENCOUNTER — HOSPITAL ENCOUNTER (OUTPATIENT)
Dept: INFUSION THERAPY | Age: 72
Discharge: HOME | End: 2022-01-04
Payer: MEDICARE

## 2022-01-04 ENCOUNTER — OFFICE VISIT (OUTPATIENT)
Dept: HEMATOLOGY | Age: 72
End: 2022-01-04
Payer: MEDICARE

## 2022-01-04 VITALS
HEART RATE: 96 BPM | OXYGEN SATURATION: 98 % | HEIGHT: 67 IN | DIASTOLIC BLOOD PRESSURE: 60 MMHG | SYSTOLIC BLOOD PRESSURE: 120 MMHG | BODY MASS INDEX: 37.98 KG/M2 | WEIGHT: 242 LBS

## 2022-01-04 DIAGNOSIS — Z79.01 ANTICOAGULATED ON COUMADIN: ICD-10-CM

## 2022-01-04 DIAGNOSIS — D69.6 THROMBOCYTOPENIA (HCC): ICD-10-CM

## 2022-01-04 DIAGNOSIS — D50.9 IRON DEFICIENCY ANEMIA, UNSPECIFIED IRON DEFICIENCY ANEMIA TYPE: Primary | ICD-10-CM

## 2022-01-04 DIAGNOSIS — Z98.84 HISTORY OF ROUX-EN-Y GASTRIC BYPASS: ICD-10-CM

## 2022-01-04 DIAGNOSIS — D50.9 IRON DEFICIENCY ANEMIA, UNSPECIFIED IRON DEFICIENCY ANEMIA TYPE: ICD-10-CM

## 2022-01-04 LAB
ALBUMIN SERPL-MCNC: 4.2 G/DL (ref 3.5–5.2)
ALP BLD-CCNC: 91 U/L (ref 35–104)
ALT SERPL-CCNC: 16 U/L (ref 9–52)
ANION GAP SERPL CALCULATED.3IONS-SCNC: 7 MMOL/L (ref 7–19)
AST SERPL-CCNC: 31 U/L (ref 14–36)
BASOPHILS ABSOLUTE: 0.01 K/UL (ref 0.01–0.08)
BASOPHILS RELATIVE PERCENT: 0.3 % (ref 0.1–1.2)
BILIRUB SERPL-MCNC: 1.3 MG/DL (ref 0.2–1.3)
BUN BLDV-MCNC: 24 MG/DL (ref 7–17)
CALCIUM SERPL-MCNC: 9.2 MG/DL (ref 8.4–10.2)
CHLORIDE BLD-SCNC: 104 MMOL/L (ref 98–111)
CO2: 29 MMOL/L (ref 22–29)
CREAT SERPL-MCNC: 1.1 MG/DL (ref 0.5–1)
EOSINOPHILS ABSOLUTE: 0.09 K/UL (ref 0.04–0.54)
EOSINOPHILS RELATIVE PERCENT: 2.4 % (ref 0.7–7)
FERRITIN: 83.5 NG/ML (ref 11.1–264)
GFR NON-AFRICAN AMERICAN: 49
GLOBULIN: 4 G/DL
GLUCOSE BLD-MCNC: 85 MG/DL (ref 74–106)
HCT VFR BLD CALC: 38.7 % (ref 34.1–44.9)
HEMOGLOBIN: 12.6 G/DL (ref 11.2–15.7)
IRON SATURATION: 23 % (ref 14–50)
IRON: 102 UG/DL (ref 37–170)
LYMPHOCYTES ABSOLUTE: 1.08 K/UL (ref 1.18–3.74)
LYMPHOCYTES RELATIVE PERCENT: 28.4 % (ref 19.3–53.1)
MCH RBC QN AUTO: 30.1 PG (ref 25.6–32.2)
MCHC RBC AUTO-ENTMCNC: 32.6 G/DL (ref 32.3–35.5)
MCV RBC AUTO: 92.4 FL (ref 79.4–94.8)
MONOCYTES ABSOLUTE: 0.43 K/UL (ref 0.24–0.82)
MONOCYTES RELATIVE PERCENT: 11.3 % (ref 4.7–12.5)
NEUTROPHILS ABSOLUTE: 2.19 K/UL (ref 1.56–6.13)
NEUTROPHILS RELATIVE PERCENT: 57.6 % (ref 34–71.1)
PDW BLD-RTO: 15.9 % (ref 11.7–14.4)
PLATELET # BLD: 96 K/UL (ref 182–369)
PMV BLD AUTO: 12.4 FL (ref 7.4–10.4)
POTASSIUM SERPL-SCNC: 4.9 MMOL/L (ref 3.5–5.1)
RBC # BLD: 4.19 M/UL (ref 3.93–5.22)
SODIUM BLD-SCNC: 140 MMOL/L (ref 137–145)
TOTAL IRON BINDING CAPACITY: 446 UG/DL (ref 265–497)
TOTAL PROTEIN: 8.2 G/DL (ref 6.3–8.2)
WBC # BLD: 3.8 K/UL (ref 3.98–10.04)

## 2022-01-04 PROCEDURE — 99213 OFFICE O/P EST LOW 20 MIN: CPT | Performed by: NURSE PRACTITIONER

## 2022-01-04 PROCEDURE — G8484 FLU IMMUNIZE NO ADMIN: HCPCS | Performed by: NURSE PRACTITIONER

## 2022-01-04 PROCEDURE — G8417 CALC BMI ABV UP PARAM F/U: HCPCS | Performed by: NURSE PRACTITIONER

## 2022-01-04 PROCEDURE — 85025 COMPLETE CBC W/AUTO DIFF WBC: CPT

## 2022-01-04 PROCEDURE — 82728 ASSAY OF FERRITIN: CPT

## 2022-01-04 PROCEDURE — 99212 OFFICE O/P EST SF 10 MIN: CPT

## 2022-01-04 PROCEDURE — 36415 COLL VENOUS BLD VENIPUNCTURE: CPT

## 2022-01-04 PROCEDURE — 83550 IRON BINDING TEST: CPT

## 2022-01-04 PROCEDURE — 80053 COMPREHEN METABOLIC PANEL: CPT

## 2022-01-04 PROCEDURE — 1036F TOBACCO NON-USER: CPT | Performed by: NURSE PRACTITIONER

## 2022-01-04 PROCEDURE — 4040F PNEUMOC VAC/ADMIN/RCVD: CPT | Performed by: NURSE PRACTITIONER

## 2022-01-04 PROCEDURE — 1123F ACP DISCUSS/DSCN MKR DOCD: CPT | Performed by: NURSE PRACTITIONER

## 2022-01-04 PROCEDURE — G8399 PT W/DXA RESULTS DOCUMENT: HCPCS | Performed by: NURSE PRACTITIONER

## 2022-01-04 PROCEDURE — 3017F COLORECTAL CA SCREEN DOC REV: CPT | Performed by: NURSE PRACTITIONER

## 2022-01-04 PROCEDURE — 1090F PRES/ABSN URINE INCON ASSESS: CPT | Performed by: NURSE PRACTITIONER

## 2022-01-04 PROCEDURE — 83540 ASSAY OF IRON: CPT

## 2022-01-04 PROCEDURE — G8428 CUR MEDS NOT DOCUMENT: HCPCS | Performed by: NURSE PRACTITIONER

## 2022-01-05 PROBLEM — R05.9 COUGH: Status: RESOLVED | Noted: 2021-12-06 | Resolved: 2022-01-05

## 2022-01-06 LAB — INR BLD: 2.8

## 2022-01-07 ENCOUNTER — ANTI-COAG VISIT (OUTPATIENT)
Dept: INTERNAL MEDICINE | Age: 72
End: 2022-01-07
Payer: MEDICARE

## 2022-01-07 DIAGNOSIS — Z79.01 ANTICOAGULATED ON COUMADIN: Primary | ICD-10-CM

## 2022-01-07 PROCEDURE — 99999 PR OFFICE/OUTPT VISIT,PROCEDURE ONLY: CPT | Performed by: INTERNAL MEDICINE

## 2022-01-07 PROCEDURE — 93793 ANTICOAG MGMT PT WARFARIN: CPT | Performed by: INTERNAL MEDICINE

## 2022-01-07 NOTE — PROGRESS NOTES
Pts INR is 2.8, her range is 2.0-3.0. She takes 15 mg Sun, Tues, Thurs, Sat and she takes 7.5 mg Mon, Wed, and Fri. Pt was told to continue current dose of coumadin and recheck in 1 week. Electronically signed by Rj Faria MD on 1/7/2022 at 10:37 AM    I have reviewed nursing plan for Coumadin management and agree with plan.

## 2022-01-11 DIAGNOSIS — E53.8 B12 DEFICIENCY: ICD-10-CM

## 2022-01-11 DIAGNOSIS — E11.9 TYPE 2 DIABETES MELLITUS WITHOUT COMPLICATION, WITHOUT LONG-TERM CURRENT USE OF INSULIN (HCC): ICD-10-CM

## 2022-01-11 DIAGNOSIS — E55.9 VITAMIN D DEFICIENCY: ICD-10-CM

## 2022-01-11 LAB
ALBUMIN SERPL-MCNC: 4.2 G/DL (ref 3.5–5.2)
ALP BLD-CCNC: 82 U/L (ref 35–104)
ALT SERPL-CCNC: 9 U/L (ref 5–33)
ANION GAP SERPL CALCULATED.3IONS-SCNC: 14 MMOL/L (ref 7–19)
AST SERPL-CCNC: 23 U/L (ref 5–32)
BASOPHILS ABSOLUTE: 0 K/UL (ref 0–0.2)
BASOPHILS RELATIVE PERCENT: 0.5 % (ref 0–1)
BILIRUB SERPL-MCNC: 0.4 MG/DL (ref 0.2–1.2)
BUN BLDV-MCNC: 27 MG/DL (ref 8–23)
CALCIUM SERPL-MCNC: 9.4 MG/DL (ref 8.8–10.2)
CHLORIDE BLD-SCNC: 103 MMOL/L (ref 98–111)
CHOLESTEROL, TOTAL: 265 MG/DL (ref 160–199)
CO2: 22 MMOL/L (ref 22–29)
CREAT SERPL-MCNC: 1.1 MG/DL (ref 0.5–0.9)
CREATININE URINE: 247.4 MG/DL (ref 4.2–622)
EOSINOPHILS ABSOLUTE: 0.1 K/UL (ref 0–0.6)
EOSINOPHILS RELATIVE PERCENT: 1.9 % (ref 0–5)
GFR AFRICAN AMERICAN: 59
GFR NON-AFRICAN AMERICAN: 49
GLUCOSE BLD-MCNC: 83 MG/DL (ref 74–109)
HBA1C MFR BLD: 5.6 % (ref 4–6)
HCT VFR BLD CALC: 41 % (ref 37–47)
HDLC SERPL-MCNC: 88 MG/DL (ref 65–121)
HEMOGLOBIN: 12.5 G/DL (ref 12–16)
IMMATURE GRANULOCYTES #: 0 K/UL
LDL CHOLESTEROL CALCULATED: 164 MG/DL
LYMPHOCYTES ABSOLUTE: 2 K/UL (ref 1.1–4.5)
LYMPHOCYTES RELATIVE PERCENT: 34.3 % (ref 20–40)
MCH RBC QN AUTO: 28.6 PG (ref 27–31)
MCHC RBC AUTO-ENTMCNC: 30.5 G/DL (ref 33–37)
MCV RBC AUTO: 93.8 FL (ref 81–99)
MICROALBUMIN UR-MCNC: 1.6 MG/DL (ref 0–19)
MICROALBUMIN/CREAT UR-RTO: 6.5 MG/G
MONOCYTES ABSOLUTE: 0.8 K/UL (ref 0–0.9)
MONOCYTES RELATIVE PERCENT: 13.4 % (ref 0–10)
NEUTROPHILS ABSOLUTE: 2.8 K/UL (ref 1.5–7.5)
NEUTROPHILS RELATIVE PERCENT: 49.5 % (ref 50–65)
PDW BLD-RTO: 15.9 % (ref 11.5–14.5)
PLATELET # BLD: 169 K/UL (ref 130–400)
PMV BLD AUTO: 12.8 FL (ref 9.4–12.3)
POTASSIUM SERPL-SCNC: 4.1 MMOL/L (ref 3.5–5)
RBC # BLD: 4.37 M/UL (ref 4.2–5.4)
SODIUM BLD-SCNC: 139 MMOL/L (ref 136–145)
TOTAL PROTEIN: 8.6 G/DL (ref 6.6–8.7)
TRIGL SERPL-MCNC: 63 MG/DL (ref 0–149)
TSH SERPL DL<=0.05 MIU/L-ACNC: 5.44 UIU/ML (ref 0.27–4.2)
VITAMIN B-12: 1026 PG/ML (ref 211–946)
VITAMIN D 25-HYDROXY: 14.9 NG/ML
WBC # BLD: 5.7 K/UL (ref 4.8–10.8)

## 2022-01-16 ASSESSMENT — ENCOUNTER SYMPTOMS
SHORTNESS OF BREATH: 0
BACK PAIN: 0
EYE DISCHARGE: 0
EYE REDNESS: 0
SORE THROAT: 0
CONSTIPATION: 0
BLOOD IN STOOL: 0
ABDOMINAL PAIN: 0
DIARRHEA: 0
NAUSEA: 0
VOMITING: 0
WHEEZING: 0
RESPIRATORY NEGATIVE: 1
EYE PAIN: 0
COUGH: 0
GASTROINTESTINAL NEGATIVE: 1
EYES NEGATIVE: 1

## 2022-01-18 ENCOUNTER — OFFICE VISIT (OUTPATIENT)
Dept: INTERNAL MEDICINE | Age: 72
End: 2022-01-18
Payer: MEDICARE

## 2022-01-18 VITALS
HEIGHT: 67 IN | HEART RATE: 88 BPM | OXYGEN SATURATION: 98 % | WEIGHT: 244.8 LBS | SYSTOLIC BLOOD PRESSURE: 126 MMHG | BODY MASS INDEX: 38.42 KG/M2 | DIASTOLIC BLOOD PRESSURE: 78 MMHG

## 2022-01-18 DIAGNOSIS — N18.31 STAGE 3A CHRONIC KIDNEY DISEASE (HCC): ICD-10-CM

## 2022-01-18 DIAGNOSIS — D68.59 HYPERCOAGULABLE STATE (HCC): ICD-10-CM

## 2022-01-18 DIAGNOSIS — J45.20 MILD INTERMITTENT ASTHMA WITHOUT COMPLICATION: ICD-10-CM

## 2022-01-18 DIAGNOSIS — E66.01 SEVERE OBESITY (BMI 35.0-35.9 WITH COMORBIDITY) (HCC): ICD-10-CM

## 2022-01-18 DIAGNOSIS — G89.29 CHRONIC LOW BACK PAIN, UNSPECIFIED BACK PAIN LATERALITY, UNSPECIFIED WHETHER SCIATICA PRESENT: ICD-10-CM

## 2022-01-18 DIAGNOSIS — I20.8 STABLE ANGINA (HCC): ICD-10-CM

## 2022-01-18 DIAGNOSIS — E11.21 TYPE II DIABETES MELLITUS WITH NEPHROPATHY (HCC): ICD-10-CM

## 2022-01-18 DIAGNOSIS — I10 PRIMARY HYPERTENSION: ICD-10-CM

## 2022-01-18 DIAGNOSIS — M32.9 H/O SYSTEMIC LUPUS ERYTHEMATOSUS (SLE) (HCC): ICD-10-CM

## 2022-01-18 DIAGNOSIS — E55.9 VITAMIN D DEFICIENCY: ICD-10-CM

## 2022-01-18 DIAGNOSIS — E53.8 B12 DEFICIENCY: ICD-10-CM

## 2022-01-18 DIAGNOSIS — D50.9 IRON DEFICIENCY ANEMIA, UNSPECIFIED IRON DEFICIENCY ANEMIA TYPE: ICD-10-CM

## 2022-01-18 DIAGNOSIS — I82.4Y9 DVT, LOWER EXTREMITY, PROXIMAL, ACUTE, UNSPECIFIED LATERALITY (HCC): ICD-10-CM

## 2022-01-18 DIAGNOSIS — E43 UNSPECIFIED SEVERE PROTEIN-CALORIE MALNUTRITION (HCC): ICD-10-CM

## 2022-01-18 DIAGNOSIS — G62.9 NEUROPATHY: ICD-10-CM

## 2022-01-18 DIAGNOSIS — Z86.2 H/O HYPERCOAGULABLE STATE: ICD-10-CM

## 2022-01-18 DIAGNOSIS — L98.492 SKIN ULCER OF UPPER ARM, WITH FAT LAYER EXPOSED (HCC): ICD-10-CM

## 2022-01-18 DIAGNOSIS — E03.9 HYPOTHYROIDISM, UNSPECIFIED TYPE: ICD-10-CM

## 2022-01-18 DIAGNOSIS — H35.029: ICD-10-CM

## 2022-01-18 DIAGNOSIS — E11.69 TYPE 2 DIABETES MELLITUS WITH OTHER SPECIFIED COMPLICATION, UNSPECIFIED WHETHER LONG TERM INSULIN USE (HCC): Primary | ICD-10-CM

## 2022-01-18 DIAGNOSIS — E78.5 HYPERLIPIDEMIA, UNSPECIFIED HYPERLIPIDEMIA TYPE: ICD-10-CM

## 2022-01-18 DIAGNOSIS — K21.9 GASTROESOPHAGEAL REFLUX DISEASE WITHOUT ESOPHAGITIS: ICD-10-CM

## 2022-01-18 DIAGNOSIS — I82.5Y9 CHRONIC DEEP VEIN THROMBOSIS (DVT) OF PROXIMAL VEIN OF LOWER EXTREMITY, UNSPECIFIED LATERALITY (HCC): ICD-10-CM

## 2022-01-18 DIAGNOSIS — M54.50 CHRONIC LOW BACK PAIN, UNSPECIFIED BACK PAIN LATERALITY, UNSPECIFIED WHETHER SCIATICA PRESENT: ICD-10-CM

## 2022-01-18 DIAGNOSIS — F33.2 SEVERE EPISODE OF RECURRENT MAJOR DEPRESSIVE DISORDER, WITHOUT PSYCHOTIC FEATURES (HCC): ICD-10-CM

## 2022-01-18 PROBLEM — I20.89 STABLE ANGINA: Status: ACTIVE | Noted: 2022-01-18

## 2022-01-18 PROCEDURE — 2022F DILAT RTA XM EVC RTNOPTHY: CPT | Performed by: INTERNAL MEDICINE

## 2022-01-18 PROCEDURE — G8399 PT W/DXA RESULTS DOCUMENT: HCPCS | Performed by: INTERNAL MEDICINE

## 2022-01-18 PROCEDURE — G8417 CALC BMI ABV UP PARAM F/U: HCPCS | Performed by: INTERNAL MEDICINE

## 2022-01-18 PROCEDURE — 4040F PNEUMOC VAC/ADMIN/RCVD: CPT | Performed by: INTERNAL MEDICINE

## 2022-01-18 PROCEDURE — 1036F TOBACCO NON-USER: CPT | Performed by: INTERNAL MEDICINE

## 2022-01-18 PROCEDURE — 1090F PRES/ABSN URINE INCON ASSESS: CPT | Performed by: INTERNAL MEDICINE

## 2022-01-18 PROCEDURE — 99214 OFFICE O/P EST MOD 30 MIN: CPT | Performed by: INTERNAL MEDICINE

## 2022-01-18 PROCEDURE — G8427 DOCREV CUR MEDS BY ELIG CLIN: HCPCS | Performed by: INTERNAL MEDICINE

## 2022-01-18 PROCEDURE — G8484 FLU IMMUNIZE NO ADMIN: HCPCS | Performed by: INTERNAL MEDICINE

## 2022-01-18 PROCEDURE — 3044F HG A1C LEVEL LT 7.0%: CPT | Performed by: INTERNAL MEDICINE

## 2022-01-18 PROCEDURE — 1123F ACP DISCUSS/DSCN MKR DOCD: CPT | Performed by: INTERNAL MEDICINE

## 2022-01-18 PROCEDURE — 96372 THER/PROPH/DIAG INJ SC/IM: CPT | Performed by: INTERNAL MEDICINE

## 2022-01-18 PROCEDURE — 3017F COLORECTAL CA SCREEN DOC REV: CPT | Performed by: INTERNAL MEDICINE

## 2022-01-18 RX ORDER — CYANOCOBALAMIN 1000 UG/ML
1000 INJECTION INTRAMUSCULAR; SUBCUTANEOUS ONCE
Status: SHIPPED | OUTPATIENT
Start: 2022-01-18

## 2022-01-18 RX ORDER — ROSUVASTATIN CALCIUM 5 MG/1
5 TABLET, COATED ORAL DAILY
Qty: 30 TABLET | Refills: 5 | Status: SHIPPED | OUTPATIENT
Start: 2022-01-18 | End: 2022-02-14 | Stop reason: SDUPTHER

## 2022-01-18 RX ADMIN — CYANOCOBALAMIN 1000 MCG: 1000 INJECTION INTRAMUSCULAR; SUBCUTANEOUS at 09:52

## 2022-01-18 NOTE — PROGRESS NOTES
Chief Complaint   Patient presents with    3 Month Follow-Up    Leg Pain     left       HPI: Olimpia Pandya is a 66 y.o. 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 CT scan showed osteoarthritis of the knee. She was negative for DVT. She does have a chronic DVT however. She has not been checking her blood sugars. Her A1c is 5.6. She is not taking any medication for diabetes at this time. Her blood pressure is well controlled. She denies any complaints of chest pain or shortness of breath. Her acid reflux is also well controlled. She is chronically on Coumadin secondary to hypercoagulable state. Her depression is stable. She is tolerating her CPAP well. Her neuropathy is well controlled. Her renal parameters remained stable. She denies any complaints of chest pain, chest pressure or shortness of breath. Her blood pressure is well controlled. Her cholesterol did go up from 2 32-2 65. She admits that she has not been taking her cholesterol medicine. Her vitamin D is low. She is recently started on calcitriol per her nephrologist.  Her thyroid function is stable. She does have a history of environmental allergies, which are well controlled. Her asthma is stable. She would like a B12 injection today.     Past Medical History:   Diagnosis Date    Abdominal pain     Abnormal EKG     Acute sinusitis     Acute superficial venous thrombosis of left lower extremity     Allergic reaction to spider bite     Anemia     Anticoagulated     Anxiety     Arrhythmia     Asthmatic bronchitis without complication 7/85/1437    Ataxic gait     Lyons's esophagus     Bowel obstruction (HCC)     Burn of abdomen wall, HYSTERECTOMY      Partial - because had a tubal pregnancy.  INCONTINENCE SURGERY      Bladder Sling    INFECTED SKIN DEBRIDEMENT Right     dog bite right forearm    OTHER SURGICAL HISTORY      IVC filter    PACEMAKER INSERTION      PACEMAKER PLACEMENT      medtronic    OH TOTAL KNEE ARTHROPLASTY Right 03/26/2018    TOTAL KNEE REPLACEMENT COMPLEX PRIMARY performed by Matthew Arroyo MD at [address] [facility] SMALL INTESTINE SURGERY      SPLENECTOMY      KRISTEL AND BSO      TONSILLECTOMY AND ADENOIDECTOMY      TOTAL KNEE ARTHROPLASTY      UPPER GASTROINTESTINAL ENDOSCOPY  12/2011    gerd s/p gastric bypass    UPPER GASTROINTESTINAL ENDOSCOPY  02/2014    normal s/p gastric bypass    UPPER GASTROINTESTINAL ENDOSCOPY  02/2010    biopsy neg Barretts, chronic reflux esophagitis s/p gastric bypass    UPPER GASTROINTESTINAL ENDOSCOPY  07/2006    unremarkable s/p gastric bypass    UPPER GASTROINTESTINAL ENDOSCOPY  08/10/2015    Dr Ximena Figueroa UPPER GASTROINTESTINAL ENDOSCOPY N/A 04/01/2016    Dr. Rivera [place]:  H Pylori(-), HH, o/w normal    UPPER GASTROINTESTINAL ENDOSCOPY N/A 05/07/2021    Dr Jaylin Rangel hiatal hernia, previous gastric bypass surgery, gastritis    VENA CAVA FILTER PLACEMENT Right 2003      Social History     Socioeconomic History    Marital status:      Spouse name: None    Number of children: 1    Years of education: None    Highest education level: None   Occupational History     Employer: [employer]    Tobacco Use    Smoking status: Never Smoker    Smokeless tobacco: Never Used   Vaping Use    Vaping Use: Never used   Substance and Sexual Activity    Alcohol use: No    Drug use: No    Sexual activity: Not Currently   Other Topics Concern    None   Social History Narrative    Lives with daughter, son-in-law, granddaughter, niece. Drives very little, daughter usually transports pt. Works part time at [employer]. Has pet dog.      Social Determinants of Health     Financial Resource Strain: Low Risk     Difficulty of Paying Living Expenses: Not very hard   Food Insecurity: No Food Insecurity    Worried About Running Out of Food in the Last Year: Never true    Ran Out of Food in the Last Year: Never true   Transportation Needs: No Transportation Needs    Lack of Transportation (Medical): No    Lack of Transportation (Non-Medical): No   Physical Activity: Inactive    Days of Exercise per Week: 0 days    Minutes of Exercise per Session: 0 min   Stress: No Stress Concern Present    Feeling of Stress : Only a little   Social Connections: Moderately Integrated    Frequency of Communication with Friends and Family: More than three times a week    Frequency of Social Gatherings with Friends and Family: More than three times a week    Attends Bahai Services: More than 4 times per year    Active Member of [address] [place] [address] [employer] or Organizations:  Yes    Attends Club or Organization Meetings: More than 4 times per year    Marital Status:    Intimate Partner Violence:     Fear of Current or Ex-Partner: Not on file    Emotionally Abused: Not on file    Physically Abused: Not on file    Sexually Abused: Not on file   Housing Stability:     Unable to Pay for Housing in the Last Year: Not on file    Number of [place] in the Last Year: Not on file    Unstable Housing in the Last Year: Not on file      Family History   Problem Relation Age of Onset    Uterine Cancer Mother     Cervical Cancer Mother     Coronary Art Dis Mother     Heart Disease Father     Lung Cancer Father     Other Father         renal failure    Colon Cancer Brother     Colon Polyps Brother     Uterine Cancer Maternal Grandmother     Cervical Cancer Maternal Grandmother     Diabetes Maternal Grandmother     Cervical Cancer Sister     Other Sister         fibromyalgia    Diabetes Paternal Aunt     Breast Cancer Maternal Cousin     Esophageal Cancer Neg Hx     Liver Cancer Neg Hx     Liver Disease Neg Hx     Stomach Cancer Neg Hx     Rectal Cancer Neg Hx         Current Outpatient Medications   Medication Sig Dispense Refill    rosuvastatin (CRESTOR) 5 MG tablet Take 1 tablet by mouth daily 30 tablet 5    pantoprazole (PROTONIX) 40 MG tablet TAKE 1 TABLET BY MOUTH TWICE DAILY BEFORE MEAL(S) 180 tablet 1    montelukast (SINGULAIR) 10 MG tablet Take 1 tablet by mouth daily for 10 days 10 tablet 0    warfarin (COUMADIN) 7.5 MG tablet 7.5mg on Monday/Wed/Fri and 15mg all other days 180 tablet 3    escitalopram (LEXAPRO) 20 MG tablet Take 1 tablet by mouth daily 90 tablet 3    sucralfate (CARAFATE) 1 GM tablet Take 1 tablet by mouth 4 times daily 120 tablet 3    CPAP Machine MISC Inhale 1 each into the lungs nightly      Cyanocobalamin 1000 MCG/ML KIT Inject as directed every 30 days      gabapentin (NEURONTIN) 100 MG capsule TAKE 1 CAPSULE BY MOUTH THREE TIMES DAILY 270 capsule 0    ferrous gluconate (FERGON) 240 (27 Fe) MG tablet Take 1 tablet by mouth 2 times daily 180 tablet 2    calcitRIOL (ROCALTROL) 0.25 MCG capsule Take 0.25 mcg by mouth daily      bumetanide (BUMEX) 2 MG tablet Take 1 tablet by mouth daily 30 tablet 5    levothyroxine (SYNTHROID) 100 MCG tablet Take 1 tablet by mouth Daily 90 tablet 3    lisinopril (PRINIVIL;ZESTRIL) 40 MG tablet Take 1 tablet by mouth once daily 90 tablet 1    baclofen (LIORESAL) 10 MG tablet Take 1 tablet by mouth 3 times daily As needed for hip pain 10 tablet 1    fexofenadine (ALLEGRA) 180 MG tablet Take 1 tablet by mouth daily Indications: Allergic Rhinitis 90 tablet 3    tiotropium (SPIRIVA RESPIMAT) 2.5 MCG/ACT AERS inhaler Inhale 2 puffs into the lungs daily 1 Inhaler 5    potassium chloride (KLOR-CON) 10 MEQ extended release tablet Take 10 mEq by mouth daily       calcipotriene (DOVONEX) 0.005 % cream Use topically as needed 3 Tube 3    clobetasol (TEMOVATE) 0.05 % cream Apply topically 2 times daily.  3 Tube 3    ondansetron (ZOFRAN) 4 MG tablet Take 1 tablet by mouth daily as needed for Nausea or Vomiting 30 tablet 0    Multiple Vitamins-Minerals (CENTRUM ADULTS) TABS Take 1 tablet by mouth daily      aspirin 81 MG tablet Take 81 mg by mouth daily       Current Facility-Administered Medications   Medication Dose Route Frequency Provider Last Rate Last Admin    cyanocobalamin injection 1,000 mcg  1,000 mcg IntraMUSCular Once Mery Ham MD            Patient Active Problem List   Diagnosis    Gastroesophageal reflux disease    History of gastric bypass    Family history of colon cancer    Fibromyalgia    Encounter for current long-term use of anticoagulants    Primary osteoarthritis of right knee    Arthritis of knee    Essential hypertension    Hyperglycemia    Complex sleep apnea syndrome    Iron deficiency anemia    Restrictive airway disease    DVT, lower extremity, proximal, acute, unspecified laterality ([address]    History of ulcer disease    Anticoagulated on Coumadin    Postmenopausal osteoporosis    Diabetes mellitus ([address]    Pacemaker    History of DVT (deep vein thrombosis)    Lupus anticoagulant disorder (HCC)    History of pulmonary embolism    Thrombocytopenia (HCC)    Hypothyroidism    Morbid obesity due to excess calories (HCC)    Asthmatic bronchitis without complication    Gastroenteritis    Colic    Abdominal pain    Non-intractable vomiting with nausea    Severe episode of recurrent major depressive disorder, without psychotic features ([address]    Lower abdominal pain    Chronic heartburn    S/P gastric bypass    Dog bite    Cellulitis of right upper extremity    Abscess of right arm    Soft tissue infection    Skin ulcer of upper arm, with fat layer exposed ([address]    History of Gage-en-Y gastric bypass    Hyperlipidemia    Hypersomnia    Nonrheumatic mitral valve regurgitation    Obesity, Class II, BMI 35-39.9    Peripheral edema    Restless legs syndrome (RLS)    Vitamin D deficiency    Chronic deep vein thrombosis (DVT) of proximal vein of lower extremity (HCC)    Unspecified severe protein-calorie malnutrition (HCC)    H/O systemic lupus erythematosus (SLE) (HCC)    Type II diabetes mellitus with nephropathy (HCC)    Stable angina (HCC)    Stage 3a chronic kidney disease ([facility]        Review of Systems   Constitutional: Positive for fatigue. Negative for activity change, appetite change, chills, diaphoresis, fever and unexpected weight change. HENT: Negative for congestion, ear pain, hearing loss, nosebleeds, postnasal drip, rhinorrhea, sinus pressure, sinus pain, sneezing, sore throat, tinnitus, trouble swallowing and voice change. Eyes: Negative for discharge and itching. Respiratory: Negative for apnea, cough, chest tightness, shortness of breath, wheezing and stridor. Cardiovascular: Positive for leg swelling. Negative for chest pain and palpitations. Left leg swelling; chronic secondary to DVT. Gastrointestinal: Negative for abdominal distention, abdominal pain, blood in stool, constipation, diarrhea, nausea and vomiting. Endocrine: Negative for cold intolerance, heat intolerance, polydipsia, polyphagia and polyuria. Genitourinary: Negative for difficulty urinating, dysuria, flank pain, frequency, hematuria and urgency. Musculoskeletal: Positive for arthralgias and back pain. Negative for gait problem, joint swelling, myalgias, neck pain and neck stiffness. She had bilateral knee pain. Right hip pain with some right-sided back pain radiating to her abdomen. She does have an upcoming appointment with orthopedics for osteoarthritis of the knee. Skin: Negative for color change, pallor, rash and wound. Allergic/Immunologic: Positive for environmental allergies. Negative for food allergies and immunocompromised state.    Neurological: Negative for dizziness, tremors, seizures, syncope, facial asymmetry, speech difficulty, weakness, light-headedness, numbness and headaches. Hematological: Negative for adenopathy. Does not bruise/bleed easily. Psychiatric/Behavioral: Positive for dysphoric mood. Negative for agitation, confusion, decreased concentration and hallucinations. The patient is not nervous/anxious and is not hyperactive. Mood has improved       /78   Pulse 88   Ht 5' 7\" (1.702 m)   Wt 244 lb 12.8 oz (111 kg)   SpO2 98%   BMI 38.34 kg/m²   Physical Exam  Vitals and nursing note reviewed. Constitutional:       General: She is not in acute distress. Appearance: Normal appearance. She is well-developed. She is obese. She is not ill-appearing, toxic-appearing or diaphoretic. HENT:      Head: Normocephalic and atraumatic. Right Ear: Tympanic membrane, ear canal and external ear normal. There is no impacted cerumen. Left Ear: Tympanic membrane, ear canal and external ear normal.      Nose: Nose normal. No congestion or rhinorrhea. Mouth/Throat:      Mouth: Mucous membranes are moist.      Pharynx: Oropharynx is clear. No oropharyngeal exudate or posterior oropharyngeal erythema. Eyes:      General: No scleral icterus. Right eye: No discharge. Left eye: No discharge. Extraocular Movements: Extraocular movements intact. Conjunctiva/sclera: Conjunctivae normal.      Pupils: Pupils are equal, round, and reactive to light. Neck:      Thyroid: No thyromegaly. Vascular: No carotid bruit or JVD. Trachea: No tracheal deviation. Cardiovascular:      Rate and Rhythm: Normal rate and regular rhythm. Pulses: Normal pulses. Heart sounds: Normal heart sounds. No murmur heard. No friction rub. No gallop. Pulmonary:      Effort: Pulmonary effort is normal. No respiratory distress. Breath sounds: Normal breath sounds. No stridor. No wheezing, rhonchi or rales. Chest:      Chest wall: No tenderness.    Abdominal:      General: Abdomen is flat. Bowel sounds are normal. There is no distension. Palpations: Abdomen is soft. There is no mass. Tenderness: There is no abdominal tenderness. There is no right CVA tenderness, left CVA tenderness, guarding or rebound. Hernia: No hernia is present. Musculoskeletal:         General: Tenderness present. No swelling, deformity or signs of injury. Normal range of motion. Cervical back: Normal range of motion. No rigidity. No muscular tenderness. Lumbar back: Spasms present. No swelling or bony tenderness. Normal range of motion. Back:       Right lower leg: No edema. Left lower leg: No edema. Comments:  Does have some hip pain on palpation of the left hip she also has calf pain and tenderness on palpation of the right calf. She does have some edema. There is no erythema noted. Visual inspection:  Deformity/amputation: absent  Skin lesions/pre-ulcerative calluses: present to bottom of the left foot  Edema: right- negative, left- negative    Sensory exam:  Monofilament sensation: normal  (minimum of 5 random plantar locations tested, avoiding callused areas - > 1 area with absence of sensation is + for neuropathy)    Plus at least one of the following:  Pulses: normal,   Pinprick: Intact  Proprioception: N/A  Vibration (128 Hz): N/A   Lymphadenopathy:      Cervical: No cervical adenopathy. Skin:     General: Skin is warm and dry. Capillary Refill: Capillary refill takes less than 2 seconds. Coloration: Skin is not jaundiced or pale. Findings: No bruising, erythema, lesion or rash. Neurological:      General: No focal deficit present. Mental Status: She is alert and oriented to person, place, and time. Mental status is at baseline. Cranial Nerves: No cranial nerve deficit. Sensory: No sensory deficit. Motor: No weakness or abnormal muscle tone.       Coordination: Coordination normal.      Gait: Gait normal.      Deep Tendon Reflexes: Reflexes normal.   Psychiatric:         Mood and Affect: Mood normal. Mood is not anxious or depressed. Behavior: Behavior normal.         Thought Content: Thought content normal.         Judgment: Judgment normal.         1. Type 2 diabetes mellitus with other specified complication, unspecified whether long term insulin use ([facility]    2. Unspecified severe protein-calorie malnutrition ([facility]    3. H/O systemic lupus erythematosus (SLE) ([facility]    4. Type II diabetes mellitus with nephropathy ([facility]    5. Stable angina (HCC)    6. Stage 3a chronic kidney disease ([facility]    7. Skin ulcer of upper arm, with fat layer exposed ([facility]    8. Severe episode of recurrent major depressive disorder, without psychotic features ([facility]    9. Chronic deep vein thrombosis (DVT) of proximal vein of lower extremity, unspecified laterality ([facility]    10. Hypercoagulable state ([facility]    11. DVT, lower extremity, proximal, acute, unspecified laterality ([facility]    12. Severe obesity (BMI 35.0-35.9 with comorbidity) ([facility]    13. Vitamin D deficiency     14. B12 deficiency    15. Hyperlipidemia, unspecified hyperlipidemia type    16. Gastroesophageal reflux disease without esophagitis    17. Mild intermittent asthma without complication    18. H/O hypercoagulable state    19. Coats' disease, unspecified laterality    20. Iron deficiency anemia, unspecified iron deficiency anemia type    21. Neuropathy    22. Hypothyroidism, unspecified type    23. Primary hypertension    24. Chronic low back pain, unspecified back pain laterality, unspecified whether sciatica present        ASSESSMENT/PLAN:    78-year-old woman here for follow-up    1. Type 2 diabetes: Diet controlled. A1c at goal    2. Hyperlipidemia: Continue Crestor as prescribed    3. Acid reflux: Continue Protonix    4. Mild intermittent asthma: Continue Singulair. Continue inhalers as prescribed    5. Chronic kidney disease, stage IIIa: Stable    6.   Chronic DVT/hypercoagulable state/coat syndrome: Continue Coumadin as prescribed    7. Depression: Well-controlled on Lexapro    8. Vitamin B12 deficiency: Check a B12 level with next lab draw    9. Vitamin D deficiency: On vitamin D supplementation per nephrology    10. Anemia: Continue ferrous gluconate    11. Neuropathy: Stable on gabapentin    12. Hypothyroidism: Continue levothyroxine at current dose    13. Hypertension: Blood pressure well controlled    14. Back pain: Baclofen as needed    15. History of protein calorie malnutrition: Patient reports that she is eating well    16. History of lupus: Asymptomatic at this time    17. Diabetic nephropathy: Stable    18. History of skin ulcer: Resolved    19  History of angina: No reports of angina at this time    20. Obesity: Stable    Mary Ordoñez was seen today for 3 month follow-up and leg pain. Diagnoses and all orders for this visit:    Type 2 diabetes mellitus with other specified complication, unspecified whether long term insulin use ([facility]    Unspecified severe protein-calorie malnutrition ([facility]    H/O systemic lupus erythematosus (SLE) ([facility]    Type II diabetes mellitus with nephropathy ([address]  -     Microalbumin / Creatinine Urine Ratio; Future  -     Vitamin B12; Future  -     Vitamin D 25 Hydroxy; Future  -     TSH without Reflex; Future  -     Lipid Panel; Future  -     Hemoglobin A1C; Future  -     Comprehensive Metabolic Panel;  Future  -     CBC Auto Differential; Future    Stable angina (HCC)    Stage 3a chronic kidney disease (HCC)    Skin ulcer of upper arm, with fat layer exposed ([address]    Severe episode of recurrent major depressive disorder, without psychotic features ([address]    Chronic deep vein thrombosis (DVT) of proximal vein of lower extremity, unspecified laterality (HCC)    Hypercoagulable state (HCC)    DVT, lower extremity, proximal, acute, unspecified laterality (HCC)    Severe obesity (BMI 35.0-35.9 with comorbidity) ([address]    Vitamin D deficiency   -     Vitamin D 25 Hydroxy; Future    B12 deficiency  -     Cancel: CT VITAMIN B12 INJECTION  -     cyanocobalamin injection 1,000 mcg    Hyperlipidemia, unspecified hyperlipidemia type    Gastroesophageal reflux disease without esophagitis    Mild intermittent asthma without complication    H/O hypercoagulable state    Coats' disease, unspecified laterality    Iron deficiency anemia, unspecified iron deficiency anemia type    Neuropathy    Hypothyroidism, unspecified type    Primary hypertension    Chronic low back pain, unspecified back pain laterality, unspecified whether sciatica present    Other orders  -     rosuvastatin (CRESTOR) 5 MG tablet; Take 1 tablet by mouth daily          Return in about 3 months (around 4/18/2022), or MEDICARE WELLNESS.      Orders Placed This Encounter   Procedures    Microalbumin / Creatinine Urine Ratio     Standing Status:   Future     Standing Expiration Date:   1/18/2023    Vitamin B12     Standing Status:   Future     Standing Expiration Date:   1/18/2023    Vitamin D 25 Hydroxy     Standing Status:   Future     Standing Expiration Date:   1/18/2023    TSH without Reflex     Standing Status:   Future     Standing Expiration Date:   1/18/2023    Lipid Panel     Standing Status:   Future     Standing Expiration Date:   1/18/2023     Order Specific Question:   Is Patient Fasting?/# of Hours     Answer:   12    Hemoglobin A1C     Standing Status:   Future     Standing Expiration Date:   1/18/2023    Comprehensive Metabolic Panel     Standing Status:   Future     Standing Expiration Date:   1/18/2023    CBC Auto Differential     Standing Status:   Future     Standing Expiration Date:   1/18/2023       Mario Cohn MD

## 2022-01-18 NOTE — PROGRESS NOTES
After obtaining consent, and per orders of Dr. Jennifer Beatty, injection of B12 given in Left deltoid by Obed More MA. Patient instructed to remain in clinic for 20 minutes afterwards, and to report any adverse reaction to me immediately.

## 2022-01-19 ASSESSMENT — ENCOUNTER SYMPTOMS
EYE DISCHARGE: 0
SINUS PRESSURE: 0
BLOOD IN STOOL: 0
NAUSEA: 0
ABDOMINAL PAIN: 0
WHEEZING: 0
DIARRHEA: 0
VOICE CHANGE: 0
STRIDOR: 0
RHINORRHEA: 0
EYE ITCHING: 0
SHORTNESS OF BREATH: 0
COUGH: 0
APNEA: 0
CONSTIPATION: 0
VOMITING: 0
COLOR CHANGE: 0
BACK PAIN: 1
CHEST TIGHTNESS: 0
SORE THROAT: 0
SINUS PAIN: 0
TROUBLE SWALLOWING: 0
ABDOMINAL DISTENTION: 0

## 2022-01-21 ENCOUNTER — ANTI-COAG VISIT (OUTPATIENT)
Dept: INTERNAL MEDICINE | Age: 72
End: 2022-01-21
Payer: MEDICARE

## 2022-01-21 ENCOUNTER — ANTI-COAG VISIT (OUTPATIENT)
Dept: INTERNAL MEDICINE | Age: 72
End: 2022-01-21

## 2022-01-21 ENCOUNTER — TELEPHONE (OUTPATIENT)
Dept: INTERNAL MEDICINE | Age: 72
End: 2022-01-21

## 2022-01-21 DIAGNOSIS — I82.5Y9 CHRONIC DEEP VEIN THROMBOSIS (DVT) OF PROXIMAL VEIN OF LOWER EXTREMITY, UNSPECIFIED LATERALITY (HCC): ICD-10-CM

## 2022-01-21 DIAGNOSIS — Z79.01 ANTICOAGULATED ON COUMADIN: Primary | ICD-10-CM

## 2022-01-21 LAB
INR BLD: 1.4
INR BLD: 1.4

## 2022-01-21 PROCEDURE — 93793 ANTICOAG MGMT PT WARFARIN: CPT | Performed by: INTERNAL MEDICINE

## 2022-01-21 NOTE — PROGRESS NOTES
HOME MONITORING REPORT    INR today:   Results for orders placed or performed in visit on 01/21/22   Protime-INR   Result Value Ref Range    INR 1.40        INR Goal: 2.0-3.0    Dosing Plan  As of 1/21/2022    TTR:  26.5 % (3.7 y)   Full warfarin instructions:  7.5 mg every Mon, Wed, Fri; 15 mg all other days              PLAN: Pt is supposed to be alternating 7.5mg/15mg but hasn't taken any in a week because she has been working at the [employer] and goes to bed as soon as she gets home without taking any Coumadin. Advised patient/caregiver to continue current dose and recheck in one week. Patient/Caregiver voiced understanding. Electronically signed by Jose Del Cid MD on 1/21/2022 at 2:49 PM    I have reviewed nursing plan for Coumadin management and agree with plan.

## 2022-01-21 NOTE — TELEPHONE ENCOUNTER
Spoke with pt. She is supposed to be taking 7.5mg/15mg. She says she has missed a weeks worth of dosing due to working at the [employer] center and coming home and jumping in bed. Please advise.

## 2022-01-21 NOTE — PROGRESS NOTES
Pts INR is 1.4, her range is 2.0-3.0.  She takes 15 mg of coumadin on Sun, Tues, Thurs, Sat and 7.5 mg on Mon, Wed, Fri.

## 2022-02-02 ENCOUNTER — ANTI-COAG VISIT (OUTPATIENT)
Dept: PRIMARY CARE CLINIC | Age: 72
End: 2022-02-02
Payer: MEDICARE

## 2022-02-02 DIAGNOSIS — Z79.01 ANTICOAGULATED ON COUMADIN: Primary | ICD-10-CM

## 2022-02-02 LAB — INR BLD: 1.4

## 2022-02-02 PROCEDURE — 93793 ANTICOAG MGMT PT WARFARIN: CPT | Performed by: INTERNAL MEDICINE

## 2022-02-02 NOTE — PROGRESS NOTES
HOME MONITORING REPORT    INR today:   Results for orders placed or performed in visit on 02/02/22   Protime-INR   Result Value Ref Range    INR 1.40        INR Goal: 2.0-3.0    Dosing Plan  As of 2/2/2022    TTR:  26.3 % (3.7 y)   Full warfarin instructions:  7.5 mg every Mon, Wed, Fri; 15 mg all other days            Patient states she has probably missed at least three doses in the last week. She again states she is working at the [employer] and she forgets to take it. PLAN: Advised patient/caregiver to restart her dose of alternating 15 mg with 7 mg continue current dose and recheck in one week. Patient/Caregiver voiced understanding      Electronically signed by Rj Faria MD on 2/2/2022 at 2:01 PM    I have reviewed nursing plan for Coumadin management and agree with plan.

## 2022-02-07 ENCOUNTER — OFFICE VISIT (OUTPATIENT)
Dept: NEUROLOGY | Facility: CLINIC | Age: 72
End: 2022-02-07

## 2022-02-07 VITALS
HEART RATE: 78 BPM | DIASTOLIC BLOOD PRESSURE: 100 MMHG | HEIGHT: 67 IN | BODY MASS INDEX: 38.61 KG/M2 | SYSTOLIC BLOOD PRESSURE: 180 MMHG | OXYGEN SATURATION: 99 % | WEIGHT: 246 LBS

## 2022-02-07 DIAGNOSIS — G47.33 OBSTRUCTIVE SLEEP APNEA SYNDROME: Primary | ICD-10-CM

## 2022-02-07 PROCEDURE — 99213 OFFICE O/P EST LOW 20 MIN: CPT | Performed by: NURSE PRACTITIONER

## 2022-02-07 NOTE — PROGRESS NOTES
Neurology Progress Note      Chief Complaint:    Obstructive sleep apnea     Subjective     Subjective:  Veronica Stewart is a 71 y.o. female who presents to the office today for obstructive sleep apnea.  She is routinely followed by Zora Jackson MD for primary care.  She was last seen by me on 4/26/2021 where she was relatively noncompliant with her therapy related to her mask not fitting well.  We sent orders to [employer] [facility] for mask fitting and to establish care as her DME company.  She presents today stating that she has yet to receive the mask fitting despite multiple documents being faxed to them for the initiation of care with her DME company.  She continues to exhibit symptoms of sleep apnea which include difficulties initiating sleep, multiple awakenings at night, restless sleep, snoring, witnessed apneas, nonrestorative sleep, and daytime hypersomnolence.  She states that her mask still is very ill fitting and has quite a bit of leaking which is very uncomfortable for her.  She states this is the reason why she does not use her machine.  Her compliance report states that past 30 days she is attempted to use her device 1 day.  She states, again, that this is directly related to the fact that she has an ill fitting mask and it is very uncomfortable for her.  She has no other complaints other than hoping that [employer] will be able to do a mask fitting soon for her.    Past Medical History:   Diagnosis Date   • Anxiety    • Arrhythmia    • Blood clot in vein 05/2018    Hospitalized    • Arben-tachy syndrome (HCC)    • Bradycardia    • Breath shortness    • Burn     left leg   • Cardiac pacemaker     11/09 [facility]   • Chest pain    • Chronic fatigue    • Depression    • Diabetes mellitus (HCC)    • Difficulty walking 5/17   • Dizziness    • Fatigue    • Fibromyalgia, primary 7/15   • Follow-up exam    • Headache, tension-type 9/2018   • Heart burn    • Hypercoagulable state, primary (HCC)     LUPUS ANTI-COAG   •  Hyperlipidemia    • Hypertension    • Liver disease    • Neuropathy in diabetes (HCC) 03/14   • Peripheral neuropathy 03/03   • Shingles 9/2018   • Shortness of breath    • Sleep apnea     complex.  using a c-pap machine   • Stroke ([facility])    • Syncope    • Tachy-redd syndrome (Formerly [facility])    • TIA (transient ischemic attack) 04/2003   • Vision loss in jr. high school    lBlind rt. eye     Past Surgical History:   Procedure Laterality Date   • APPENDECTOMY     • CARDIAC ELECTROPHYSIOLOGY PROCEDURE N/A 2/26/2020    Procedure: PPM generator change - dual;  Surgeon: Ronny Lowe MD;  Location:  [facility];  Service: Cardiology;  Laterality: N/A;   • CATARACT EXTRACTION     • CHOLECYSTECTOMY     • HERNIA REPAIR     • HYSTERECTOMY     • INSERT / REPLACE / REMOVE PACEMAKER     • OOPHORECTOMY     • PACEMAKER IMPLANTATION     • REPLACEMENT TOTAL KNEE Right 03/26/2018    Dr doherty    • TRAM-EN-Y PROCEDURE  2002    Dr Dahiana [facility]   • SPLENECTOMY       Family History   Problem Relation Age of Onset   • Coronary artery disease Mother    • Hyperlipidemia Mother    • Anemia Mother    • Cervical cancer Mother    • Kidney failure Father    • Heart failure Father    • Fibromyalgia Sister    • Anemia Sister    • Clotting disorder Sister    • Neuropathy Sister    • Alcohol abuse Brother    • Cancer Maternal Grandmother    • Diabetes Maternal Grandmother    • Heart failure Maternal Grandfather    • No Known Problems Paternal Grandmother    • No Known Problems Paternal Grandfather    • Colon cancer Brother      Social History     Tobacco Use   • Smoking status: Never Smoker   • Smokeless tobacco: Never Used   Vaping Use   • Vaping Use: Never used   Substance Use Topics   • Alcohol use: No   • Drug use: No     Medications:  Current Outpatient Medications   Medication Sig Dispense Refill   • aspirin 81 MG EC tablet Take 81 mg by mouth Daily.     • baclofen (LIORESAL) 10 MG tablet Take 10 mg by mouth 3 (Three)  Times a Day.     • bumetanide (BUMEX) 1 MG tablet Take 2 mg by mouth Daily.     • calcipotriene (DOVONEX) 0.005 % cream Apply  topically to the appropriate area as directed As Needed.     • calcitriol (ROCALTROL) 0.25 MCG capsule Take 0.25 mcg by mouth Daily.     • clobetasol (TEMOVATE) 0.05 % cream Apply  topically 2 (Two) Times a Day.     • CloNIDine (CATAPRES) 0.1 MG tablet Take 0.1 mg by mouth 2 (Two) Times a Day As Needed.     • cyanocobalamin 1000 MCG/ML injection Inject 1,000 mcg into the shoulder, thigh, or buttocks Every 28 (Twenty-Eight) Days.     • Diclofenac Sodium 2 % solution Place 1 applicator on the skin as directed by provider 2 (Two) Times a Day.     • escitalopram (LEXAPRO) 20 MG tablet Take 1 tablet by mouth Daily. 30 tablet 2   • Ferrous Gluconate 239 (27 Fe) MG tablet Take 1 tablet by mouth 3 (Three) Times a Day.     • fexofenadine (ALLEGRA) 180 MG tablet Take 180 mg by mouth Daily.     • gabapentin (NEURONTIN) 100 MG capsule Take 100 mg by mouth 3 (Three) Times a Day.     • levothyroxine (SYNTHROID, LEVOTHROID) 100 MCG tablet Take 100 mcg by mouth Daily.     • lisinopril (PRINIVIL,ZESTRIL) 40 MG tablet Take 40 mg by mouth Daily.     • Multiple Vitamins-Minerals (MULTIVITAMIN WITH MINERALS) tablet tablet Take 1 tablet by mouth Daily.     • ondansetron (ZOFRAN) 4 MG tablet Take 4 mg by mouth Daily As Needed for Nausea or Vomiting.     • pantoprazole (PROTONIX) 40 MG EC tablet Take 40 mg by mouth Daily.     • polyethylene glycol (MIRALAX) packet Take 17 g by mouth As Needed.     • pregabalin (LYRICA) 75 MG capsule Take 75 mg by mouth 3 (Three) Times a Day.     • sucralfate (CARAFATE) 1 g tablet Take 1 g by mouth 4 (Four) Times a Day.     • tiotropium (SPIRIVA HANDIHALER) 18 MCG per inhalation capsule Place 18 mcg into inhaler and inhale Daily.     • vitamin D (ERGOCALCIFEROL) 1.25 MG (57087 UT) capsule capsule Take 50,000 Units by mouth 2 (Two) Times a Week.     • warfarin (COUMADIN) 7.5 MG  tablet Take 7.5 mg by mouth Daily. Alternating 7.5mg with 15mg       No current facility-administered medications for this visit.     Current outpatient and discharge medications have been reconciled for the patient.  Reviewed by: CATRACHITO Mcdonnell      Allergies:    Bee venom, Insect extract allergy skin test, Percocet [oxycodone-acetaminophen], Prednisone, Tizanidine hcl, Ultram [tramadol hcl], Hydrocodone-acetaminophen, and Other    Review of Systems:   Review of Systems   Psychiatric/Behavioral: Positive for sleep disturbance.   All other systems reviewed and are negative.        Objective      Vital Signs  Heart Rate:  [78] 78  BP: (180)/(100) 180/100    Physical Exam:  Physical Exam  Constitutional:       Appearance: Normal appearance. She is obese.   HENT:      Head: Normocephalic.   Eyes:      Extraocular Movements: Extraocular movements intact.      Pupils: Pupils are equal, round, and reactive to light.   Cardiovascular:      Rate and Rhythm: Normal rate and regular rhythm.      Pulses: Normal pulses.   Pulmonary:      Effort: Pulmonary effort is normal.   Musculoskeletal:         General: Normal range of motion.      Cervical back: Normal range of motion and neck supple.   Skin:     General: Skin is warm and dry.      Capillary Refill: Capillary refill takes less than 2 seconds.   Neurological:      Gait: Gait is intact.   Psychiatric:         Mood and Affect: Mood normal.       Mallampati Classification: II (hard and soft palate, upper portion of tonsils anduvula visible)       Results Review:      [place] Sleepiness Scale: 22    STOP-BANG: High risk FRANCIE    Compliance Report:   This compliance report is for the dates of 1/9/2022-2/7/2022.  The patient used the PAP device for 1/30 days for a 3% compliance.  Of those days, the device was used for greater than 4 hours for 0 days for a 0% compliance.  The patient is set on BiPAP with a EPAP of 10 cm H2O and IPAP of 20 cm H2O..  AHI is currently 18.5 for  the short duration of time it was used.    Assessment/Plan     Impression:  • Obstructive sleep apnea  • Non-compliance with BiPAP      Plan:  · Continue with  BiPAP therapy.  I had very lengthy discussion with the importance of compliance with therapy.  I have encouraged her to continue to attempt to use her BiPAP even with her current supplies.  The patient recognizes the need for adherence to the prescribed therapy.    I again explained to her that the reason why she continues to have such daytime somnolence is very likely related to the fact that she is not using her BiPAP.  She states understanding.    · I will, again, send orders for mask fitting to [facility].  I will also check into the reason why we have had such an extended period of time where she has not had a new mask or a fitting for a new mask.    · I have recommended regular cardiovascular exercise in the form of walking, biking or swimming 30-40 minutes at a time at least 3-4 times per week.    · Counseled on multimodal approach to treatment of sleep apnea to include but not limited to diet, exercise, sleep hygiene, compliance with pap therapy.     · Encouraged lateral sleeping position and to avoid sedatives or alcohol close to bedtime.     · Risks of untreated sleep apnea were discussed to include but not limited to HTN, heart disease, stroke, cardiac arrhythmia such as AFIB, and dementia.    · I have reviewed the current compliance download with the patient in detail.  She  understands that a certain level of compliance allows for continued insurance coverage as well as adequate treatment of underlying apnea.  She also understands the impact this has upon their overall health status and other medical comorbidities.     The plan of care was fully discussed with the patient and they are in full agreement at this time.     Follow-Up:  • Return in about 3 months (around 5/7/2022) for Obstructive sleep apnea follow-up.      Caleb Baldwin,  CATRACHITO  02/07/22  12:48 CST

## 2022-02-14 DIAGNOSIS — E03.9 HYPOTHYROIDISM, UNSPECIFIED TYPE: ICD-10-CM

## 2022-02-14 DIAGNOSIS — I82.5Y9 CHRONIC DEEP VEIN THROMBOSIS (DVT) OF PROXIMAL VEIN OF LOWER EXTREMITY, UNSPECIFIED LATERALITY (HCC): ICD-10-CM

## 2022-02-14 DIAGNOSIS — E78.5 HYPERLIPIDEMIA, UNSPECIFIED HYPERLIPIDEMIA TYPE: ICD-10-CM

## 2022-02-14 DIAGNOSIS — I10 ESSENTIAL HYPERTENSION: ICD-10-CM

## 2022-02-14 DIAGNOSIS — R05.9 COUGH: ICD-10-CM

## 2022-02-14 DIAGNOSIS — I10 ESSENTIAL HYPERTENSION: Primary | ICD-10-CM

## 2022-02-14 DIAGNOSIS — K21.9 GASTROESOPHAGEAL REFLUX DISEASE WITHOUT ESOPHAGITIS: ICD-10-CM

## 2022-02-14 DIAGNOSIS — R12 CHRONIC HEARTBURN: ICD-10-CM

## 2022-02-14 DIAGNOSIS — K52.9 GASTROENTERITIS: ICD-10-CM

## 2022-02-14 DIAGNOSIS — Z79.01 ANTICOAGULATED ON COUMADIN: Primary | ICD-10-CM

## 2022-02-14 RX ORDER — LISINOPRIL 40 MG/1
TABLET ORAL
Qty: 90 TABLET | Refills: 1 | Status: SHIPPED | OUTPATIENT
Start: 2022-02-14 | End: 2022-07-11

## 2022-02-14 RX ORDER — WARFARIN SODIUM 7.5 MG/1
TABLET ORAL
Qty: 180 TABLET | Refills: 3 | Status: SHIPPED | OUTPATIENT
Start: 2022-02-14

## 2022-02-14 RX ORDER — MONTELUKAST SODIUM 10 MG/1
10 TABLET ORAL DAILY
Qty: 10 TABLET | Refills: 0 | Status: SHIPPED | OUTPATIENT
Start: 2022-02-14 | End: 2022-04-01 | Stop reason: SDUPTHER

## 2022-02-14 RX ORDER — BUMETANIDE 2 MG/1
2 TABLET ORAL DAILY
Qty: 30 TABLET | Refills: 5 | Status: SHIPPED | OUTPATIENT
Start: 2022-02-14 | End: 2022-07-11

## 2022-02-14 RX ORDER — POTASSIUM CHLORIDE 750 MG/1
10 TABLET, FILM COATED, EXTENDED RELEASE ORAL DAILY
Qty: 60 TABLET | Refills: 5 | Status: ON HOLD | OUTPATIENT
Start: 2022-02-14 | End: 2022-08-18 | Stop reason: HOSPADM

## 2022-02-14 RX ORDER — CLOBETASOL PROPIONATE 0.5 MG/G
CREAM TOPICAL
Qty: 3 EACH | Refills: 2 | Status: SHIPPED | OUTPATIENT
Start: 2022-02-14

## 2022-02-14 RX ORDER — LEVOTHYROXINE SODIUM 0.1 MG/1
100 TABLET ORAL DAILY
Qty: 90 TABLET | Refills: 3 | Status: ON HOLD | OUTPATIENT
Start: 2022-02-14 | End: 2022-08-18 | Stop reason: HOSPADM

## 2022-02-14 RX ORDER — CALCITRIOL 0.25 UG/1
0.25 CAPSULE, LIQUID FILLED ORAL DAILY
Qty: 30 CAPSULE | Refills: 3 | Status: SHIPPED | OUTPATIENT
Start: 2022-02-14 | End: 2022-04-07

## 2022-02-14 RX ORDER — PANTOPRAZOLE SODIUM 40 MG/1
TABLET, DELAYED RELEASE ORAL
Qty: 180 TABLET | Refills: 1 | Status: SHIPPED | OUTPATIENT
Start: 2022-02-14 | End: 2022-08-23

## 2022-02-14 RX ORDER — ONDANSETRON 4 MG/1
4 TABLET, FILM COATED ORAL DAILY PRN
Qty: 30 TABLET | Refills: 0 | Status: SHIPPED | OUTPATIENT
Start: 2022-02-14 | End: 2022-03-08

## 2022-02-14 RX ORDER — SUCRALFATE 1 G/1
1 TABLET ORAL 4 TIMES DAILY
Qty: 120 TABLET | Refills: 3 | Status: SHIPPED | OUTPATIENT
Start: 2022-02-14 | End: 2022-04-07

## 2022-02-14 RX ORDER — ROSUVASTATIN CALCIUM 5 MG/1
5 TABLET, COATED ORAL DAILY
Qty: 30 TABLET | Refills: 5 | Status: SHIPPED | OUTPATIENT
Start: 2022-02-14 | End: 2022-07-06

## 2022-02-14 NOTE — TELEPHONE ENCOUNTER
Lincoln Snyder called to request a refill on her medication. Last office visit : 1/18/2022   Next office visit : 4/19/2022     Requested Prescriptions     Pending Prescriptions Disp Refills    pantoprazole (PROTONIX) 40 MG tablet 180 tablet 1     Sig: TAKE 1 TABLET BY MOUTH TWICE DAILY BEFORE MEAL(S)    rosuvastatin (CRESTOR) 5 MG tablet 30 tablet 5     Sig: Take 1 tablet by mouth daily    clobetasol (TEMOVATE) 0.05 % cream 3 each 2     Sig: Apply topically 2 times daily.     montelukast (SINGULAIR) 10 MG tablet 10 tablet 0     Sig: Take 1 tablet by mouth daily for 10 days    lisinopril (PRINIVIL;ZESTRIL) 40 MG tablet 90 tablet 1     Sig: Take 1 tablet by mouth once daily    calcitRIOL (ROCALTROL) 0.25 MCG capsule 30 capsule 3     Sig: Take 1 capsule by mouth daily            Diana Gaffney MA

## 2022-02-14 NOTE — TELEPHONE ENCOUNTER
Derrell Lau called to request a refill on her medication.       Last office visit : 2022   Next office visit : 2022     Requested Prescriptions     Pending Prescriptions Disp Refills    bumetanide (BUMEX) 2 MG tablet 30 tablet 5     Sig: Take 1 tablet by mouth daily    levothyroxine (SYNTHROID) 100 MCG tablet 90 tablet 3     Sig: Take 1 tablet by mouth Daily    ondansetron (ZOFRAN) 4 MG tablet 30 tablet 0     Sig: Take 1 tablet by mouth daily as needed for Nausea or Vomiting    potassium chloride (KLOR-CON) 10 MEQ extended release tablet 60 tablet 5     Sig: Take 1 tablet by mouth daily    sucralfate (CARAFATE) 1 GM tablet 120 tablet 3     Sig: Take 1 tablet by mouth 4 times daily    warfarin (COUMADIN) 7.5 MG tablet 180 tablet 3     Si.5mg on Monday/Wed/Fri and 15mg all other days            Mirella Musa MA

## 2022-02-16 ENCOUNTER — ANTI-COAG VISIT (OUTPATIENT)
Dept: PRIMARY CARE CLINIC | Age: 72
End: 2022-02-16
Payer: MEDICARE

## 2022-02-16 DIAGNOSIS — Z79.01 ANTICOAGULATED ON COUMADIN: Primary | ICD-10-CM

## 2022-02-16 LAB — INR BLD: 4.1

## 2022-02-16 PROCEDURE — 93793 ANTICOAG MGMT PT WARFARIN: CPT | Performed by: INTERNAL MEDICINE

## 2022-02-16 NOTE — PROGRESS NOTES
HOME MONITORING REPORT    INR today:   Results for orders placed or performed in visit on 02/16/22   Protime-INR   Result Value Ref Range    INR 4.10        INR Goal: 2.0-3.0    Dosing Plan  As of 2/16/2022    TTR:  26.4 % (3.7 y)   Full warfarin instructions:  2/16: Hold; 2/17: 7.5 mg; Otherwise 7.5 mg every Mon, Wed, Fri; 15 mg all other days              PLAN: Advised patient/caregiver to hold her dose tonight and reduce dose tomorrow to 7.5 mg. Then continue current dose and recheck in one week. Patient/Caregiver voiced understanding. Electronically signed by Hilary Hdz MD on 2/17/2022 at 1:02 AM    I have reviewed nursing plan for Coumadin management and agree with plan.

## 2022-02-24 ENCOUNTER — ANTI-COAG VISIT (OUTPATIENT)
Dept: PRIMARY CARE CLINIC | Age: 72
End: 2022-02-24
Payer: MEDICARE

## 2022-02-24 DIAGNOSIS — Z79.01 ANTICOAGULATED ON COUMADIN: Primary | ICD-10-CM

## 2022-02-24 LAB — INR BLD: 1.5

## 2022-02-24 PROCEDURE — 93793 ANTICOAG MGMT PT WARFARIN: CPT | Performed by: INTERNAL MEDICINE

## 2022-02-24 NOTE — PROGRESS NOTES
HOME MONITORING REPORT    INR today:   Results for orders placed or performed in visit on 02/24/22   Protime-INR   Result Value Ref Range    INR 1.50        INR Goal: 2.0-3.0    Dosing Plan  As of 2/24/2022    TTR:  26.4 % (3.8 y)   Full warfarin instructions:  2/25: 15 mg; Otherwise 7.5 mg every Mon, Wed, Fri; 15 mg all other days              PLAN: Advised patient/caregiver via voicemail to increase her dose tomorrow to 15 mg, then continue current dose and recheck in one week. Asked that she call back if she has any question.      .Electronically signed by Angi Adkins MD on 2/24/2022 at 3:29 PM

## 2022-03-01 DIAGNOSIS — G62.9 NEUROPATHY: ICD-10-CM

## 2022-03-01 RX ORDER — QUINIDINE GLUCONATE 324 MG
240 TABLET, EXTENDED RELEASE ORAL 2 TIMES DAILY
Qty: 180 TABLET | Refills: 2 | Status: SHIPPED | OUTPATIENT
Start: 2022-03-01 | End: 2022-05-20

## 2022-03-01 RX ORDER — BACLOFEN 10 MG/1
10 TABLET ORAL 3 TIMES DAILY
Qty: 30 TABLET | Refills: 1 | Status: SHIPPED | OUTPATIENT
Start: 2022-03-01

## 2022-03-01 RX ORDER — CALCIPOTRIENE 50 UG/G
CREAM TOPICAL
Qty: 3 EACH | Refills: 1 | Status: SHIPPED | OUTPATIENT
Start: 2022-03-01

## 2022-03-01 RX ORDER — ESCITALOPRAM OXALATE 20 MG/1
20 TABLET ORAL DAILY
Qty: 90 TABLET | Refills: 3 | Status: SHIPPED | OUTPATIENT
Start: 2022-03-01

## 2022-03-01 RX ORDER — GABAPENTIN 100 MG/1
CAPSULE ORAL
Qty: 270 CAPSULE | Refills: 0 | Status: SHIPPED | OUTPATIENT
Start: 2022-03-01 | End: 2022-04-19 | Stop reason: SDUPTHER

## 2022-03-01 NOTE — TELEPHONE ENCOUNTER
----- Message from Jacky Trivedi sent at 2/28/2022  4:03 PM CST -----  Subject: Refill Request    QUESTIONS  Name of Medication? Other - Baclofen 10mg  Patient-reported dosage and instructions? one tablet 3 x daily  How many days do you have left? 0  Preferred Pharmacy? [facility] phone number (if available)? [phone]  ---------------------------------------------------------------------------  --------------,  Name of Medication? Other - Calcipotriene cream   Patient-reported dosage and instructions? as needed  How many days do you have left? 0  Preferred Pharmacy? [facility] phone number (if available)? [phone]  ---------------------------------------------------------------------------  --------------,  Name of Medication? Other - B-12 injection  Patient-reported dosage and instructions? injection every 30 days  How many days do you have left? 0  Preferred Pharmacy? [facility] phone number (if available)? [phone]  ---------------------------------------------------------------------------  --------------,  Name of Medication? escitalopram (LEXAPRO) 20 MG tablet  Patient-reported dosage and instructions? once daily  How many days do you have left? 0  Preferred Pharmacy? [facility] phone number (if available)? [phone]  ---------------------------------------------------------------------------  --------------,  Name of Medication? ferrous gluconate (FERGON) 240 (27 Fe) MG tablet  Patient-reported dosage and instructions? one tablet twice daily  How many days do you have left? 0  Preferred Pharmacy?  [facility] phone number (if available)? [phone]  ---------------------------------------------------------------------------  --------------,  Name of Medication? fexofenadine (ALLEGRA) 180 MG tablet  Patient-reported dosage and instructions? one tablet daily  How many days do you have left? 0  Preferred Pharmacy? [facility] phone number (if available)? [phone]  ---------------------------------------------------------------------------  --------------,  Name of Medication? gabapentin (NEURONTIN) 100 MG capsule  Patient-reported dosage and instructions? 3 times daily  How many days do you have left? 0  Preferred Pharmacy? [facility] phone number (if available)? [phone]  ---------------------------------------------------------------------------  --------------,  Name of Medication? Other - Inhaler  Patient-reported dosage and instructions? 2 pumps daily  How many days do you have left? 0  [facility]? [facility] number (if available)? [phone]  Additional Information for Provider? Please call pt with any further   questions.   ---------------------------------------------------------------------------  --------------  CALL BACK INFO  What is the best way for the office to contact you?  OK to leave message on   voicemail  Preferred Call Back Phone Number? [phone]

## 2022-03-02 ENCOUNTER — ANTI-COAG VISIT (OUTPATIENT)
Dept: PRIMARY CARE CLINIC | Age: 72
End: 2022-03-02
Payer: MEDICARE

## 2022-03-02 DIAGNOSIS — Z79.01 ANTICOAGULATED ON COUMADIN: Primary | ICD-10-CM

## 2022-03-02 LAB — INR BLD: 1.7

## 2022-03-02 PROCEDURE — 93793 ANTICOAG MGMT PT WARFARIN: CPT | Performed by: INTERNAL MEDICINE

## 2022-03-02 NOTE — PROGRESS NOTES
HOME MONITORING REPORT    INR today:   Results for orders placed or performed in visit on 03/02/22   Protime-INR   Result Value Ref Range    INR 1.70        INR Goal: 2.0-3.0    Dosing Plan  As of 3/2/2022    TTR:  26.3 % (3.8 y)   Full warfarin instructions:  3/2: 15 mg; Otherwise 7.5 mg every Mon, Wed, Fri; 15 mg all other days          \"I may have missed a dose one night\"    PLAN: Advised patient/caregiver to increase dose tonight to 15 mg, then continue current dose and recheck in one week. Patient/Caregiver voiced understanding      Electronically signed by Sameer Alcazar MD on 3/3/2022 at 5:14 AM    I have reviewed nursing plan for Coumadin management and agree with plan.

## 2022-03-03 ENCOUNTER — TRANSCRIBE ORDERS (OUTPATIENT)
Dept: ADMINISTRATIVE | Facility: HOSPITAL | Age: 72
End: 2022-03-03

## 2022-03-03 ENCOUNTER — HOSPITAL ENCOUNTER (OUTPATIENT)
Dept: ULTRASOUND IMAGING | Facility: HOSPITAL | Age: 72
Discharge: HOME | End: 2022-03-03
Admitting: PHYSICIAN ASSISTANT

## 2022-03-03 DIAGNOSIS — M79.605 PAIN IN LEFT LEG: ICD-10-CM

## 2022-03-03 DIAGNOSIS — M79.604 PAIN IN RIGHT LEG: ICD-10-CM

## 2022-03-03 DIAGNOSIS — M79.604 PAIN IN RIGHT LEG: Primary | ICD-10-CM

## 2022-03-03 PROCEDURE — 93970 EXTREMITY STUDY: CPT

## 2022-03-04 ENCOUNTER — TELEPHONE (OUTPATIENT)
Dept: INTERNAL MEDICINE | Age: 72
End: 2022-03-04

## 2022-03-04 ENCOUNTER — PATIENT MESSAGE (OUTPATIENT)
Dept: INTERNAL MEDICINE | Age: 72
End: 2022-03-04

## 2022-03-04 DIAGNOSIS — M25.562 LEFT KNEE PAIN, UNSPECIFIED CHRONICITY: ICD-10-CM

## 2022-03-04 DIAGNOSIS — M79.605 PAIN OF LEFT LOWER EXTREMITY: Primary | ICD-10-CM

## 2022-03-04 NOTE — TELEPHONE ENCOUNTER
Frederick Cassidy with of [facility] left a message saying they got some paperwork on this pt. They got the xray results. I don't see in the chart where a referral was placed or records were faxed anywhere. Pt was notified that she needed to orthopedics, but that was it. I guess pt faxed it to them herself. Frederick Cassidy with [facility] said Dr. Hillary Noel wouldn't do this,  but Dr. Lawrence Cornelius would. Fax [phone]. I left a voicemail for pt to message us back to let us know if she wants a referral to them.

## 2022-03-07 ENCOUNTER — TELEPHONE (OUTPATIENT)
Dept: INTERNAL MEDICINE | Age: 72
End: 2022-03-07

## 2022-03-07 NOTE — TELEPHONE ENCOUNTER
Patient is calling in to request a referral    Dr Robin Ornelas surgeon    Ph. [phone]  Fax [phone]      Please call and advise

## 2022-03-07 NOTE — TELEPHONE ENCOUNTER
From: ANPUAMA Montiel  To: Elizabeth Hanson  Sent: 3/4/2022 11:29 AM CST  Subject: orthopedics    I left you a voicemail. Are you wanting a referral to [facility]?     Katerina/GWYN for Dr. Jennifer Beatty

## 2022-03-08 DIAGNOSIS — K52.9 GASTROENTERITIS: ICD-10-CM

## 2022-03-08 RX ORDER — ONDANSETRON 4 MG/1
TABLET, FILM COATED ORAL
Qty: 30 TABLET | Refills: 0 | Status: SHIPPED | OUTPATIENT
Start: 2022-03-08 | End: 2022-03-31

## 2022-03-08 NOTE — TELEPHONE ENCOUNTER
Theresa Ernandez called requesting a refill of the below medication which has been pended for you:     Requested Prescriptions     Pending Prescriptions Disp Refills    ondansetron (ZOFRAN) 4 MG tablet [Pharmacy Med Name: ONDANSETRON HYDROCHLORIDE 4 MG Tablet] 30 tablet 0     Sig: TAKE 1 TABLET DAILY AS NEEDED FOR NAUSEA OR VOMITING       Last Appointment Date: 1/18/2022  Next Appointment Date: 4/19/2022    Allergies   Allergen Reactions    Insect Extract Allergy Skin Test Anaphylaxis     Bee stings    Lactose Intolerance (Gi)     Prednisone      Headache and upset stomach    Zanaflex [Tizanidine Hcl]      Passed out lost control of body functions    Lortab [Hydrocodone-Acetaminophen] Nausea And Vomiting     Sweats, weak, nausea and vomiting    Other Nausea And Vomiting     Opiates------sweating, weak        Oxycodone-Acetaminophen Nausea And Vomiting     Sweating and vomiting     Ultram [Tramadol Hcl] Nausea And Vomiting     Sweating, weak, nausea and vomiting

## 2022-03-10 LAB — INR BLD: 4.4

## 2022-03-11 ENCOUNTER — ANTI-COAG VISIT (OUTPATIENT)
Dept: INTERNAL MEDICINE | Age: 72
End: 2022-03-11
Payer: MEDICARE

## 2022-03-11 DIAGNOSIS — Z79.01 ANTICOAGULATED ON COUMADIN: Primary | ICD-10-CM

## 2022-03-11 PROCEDURE — 93793 ANTICOAG MGMT PT WARFARIN: CPT | Performed by: INTERNAL MEDICINE

## 2022-03-11 NOTE — PROGRESS NOTES
HOME MONITORING REPORT    INR today:   Results for orders placed or performed in visit on 03/11/22   Protime-INR   Result Value Ref Range    INR 4.40        INR Goal: 2.0-3.0    Dosing Plan  As of 3/11/2022    TTR:  26.4 % (3.8 y)   Full warfarin instructions:  7.5 mg every Mon, Wed, Fri; 15 mg all other days              PLAN: Advised patient/caregiver to hold X 3 and resume current dose Monday and recheck Tuesday. Patient/Caregiver voiced understanding    Electronically signed by Isauro Carr MD on 3/11/2022 at 12:28 PM  I have reviewed nursing plan for Coumadin management and agree with plan.

## 2022-03-11 NOTE — PROGRESS NOTES
Pts INR is 4.4, her range is 2-3. Pt is on 15 mg of coumadin on Sun, Tues, Thurs, and Sat. She is on 7.5 mg on Mon, Wed, and Friday. Please advise.

## 2022-03-15 ENCOUNTER — ANTI-COAG VISIT (OUTPATIENT)
Dept: PRIMARY CARE CLINIC | Age: 72
End: 2022-03-15
Payer: MEDICARE

## 2022-03-15 DIAGNOSIS — Z79.01 ANTICOAGULATED ON COUMADIN: Primary | ICD-10-CM

## 2022-03-15 LAB — INR BLD: 2

## 2022-03-15 PROCEDURE — 93793 ANTICOAG MGMT PT WARFARIN: CPT | Performed by: INTERNAL MEDICINE

## 2022-03-15 NOTE — PROGRESS NOTES
HOME MONITORING REPORT    INR today:   Results for orders placed or performed in visit on 03/15/22   Protime-INR   Result Value Ref Range    INR 2.00        INR Goal: 2.0-3.0    Dosing Plan  As of 3/15/2022    TTR:  26.4 % (3.8 y)   Full warfarin instructions:  7.5 mg every Mon, Wed, Fri; 15 mg all other days              PLAN: Advised patient/caregiver to continue current dose and recheck in one week. Patient/Caregiver voiced understanding      Electronically signed by Jamee Johnson MD on 3/15/2022 at 4:28 PM    I have reviewed nursing plan for Coumadin management and agree with plan.

## 2022-03-23 ENCOUNTER — TELEPHONE (OUTPATIENT)
Dept: CARDIOLOGY | Facility: CLINIC | Age: 72
End: 2022-03-23

## 2022-03-23 NOTE — TELEPHONE ENCOUNTER
Suly Rodriguez James K, MD; Rafat Anthony CMA Dr. Patel is requesting surgery clearance on the above patient.  Patient's LOV was 8/14/21 and she is due to return on 8/19/22.  Please advise if patient can be cleared for surgery.  Thank you.

## 2022-03-24 LAB — INR BLD: 3.4

## 2022-03-28 ENCOUNTER — ANTI-COAG VISIT (OUTPATIENT)
Dept: PRIMARY CARE CLINIC | Age: 72
End: 2022-03-28
Payer: MEDICARE

## 2022-03-28 DIAGNOSIS — Z79.01 ANTICOAGULATED ON COUMADIN: Primary | ICD-10-CM

## 2022-03-28 PROCEDURE — 93793 ANTICOAG MGMT PT WARFARIN: CPT | Performed by: INTERNAL MEDICINE

## 2022-03-28 NOTE — PROGRESS NOTES
HOME MONITORING REPORT    INR today:   Results for orders placed or performed in visit on 03/28/22   Protime-INR   Result Value Ref Range    INR 3.40        INR Goal: 2.0-3.0    Dosing Plan  As of 3/28/2022    TTR:  26.9 % (3.9 y)   Full warfarin instructions:  7.5 mg every Mon, Wed, Fri; 15 mg all other days              PLAN: patient/caregiver skipped her dose last Thursday pm she was told today to continue current dose and recheck in one week. Patient/Caregiver voiced understanding      Electronically signed by Dianna Tellez MD on 3/28/2022 at 11:14 AM    I have reviewed nursing plan for Coumadin management and agree with plan.

## 2022-03-29 ENCOUNTER — HOSPITAL ENCOUNTER (OUTPATIENT)
Dept: PREADMISSION TESTING | Age: 72
Discharge: HOME | End: 2022-04-02
Payer: MEDICARE

## 2022-03-29 ENCOUNTER — HOSPITAL ENCOUNTER (OUTPATIENT)
Dept: GENERAL RADIOLOGY | Age: 72
Discharge: HOME | End: 2022-03-29
Payer: MEDICARE

## 2022-03-29 VITALS — WEIGHT: 245 LBS | HEIGHT: 67 IN | BODY MASS INDEX: 38.45 KG/M2

## 2022-03-29 LAB
ABO/RH: NORMAL
ALBUMIN SERPL-MCNC: 4.1 G/DL (ref 3.5–5.2)
ALP BLD-CCNC: 71 U/L (ref 35–104)
ALT SERPL-CCNC: 17 U/L (ref 5–33)
ANION GAP SERPL CALCULATED.3IONS-SCNC: 12 MMOL/L (ref 7–19)
ANTIBODY SCREEN: NORMAL
APTT: 45.4 SEC (ref 26–36.2)
AST SERPL-CCNC: 33 U/L (ref 5–32)
BACTERIA: ABNORMAL /HPF
BASOPHILS ABSOLUTE: 0 K/UL (ref 0–0.2)
BASOPHILS RELATIVE PERCENT: 0.4 % (ref 0–1)
BILIRUB SERPL-MCNC: 0.4 MG/DL (ref 0.2–1.2)
BILIRUBIN URINE: NEGATIVE
BLOOD, URINE: ABNORMAL
BUN BLDV-MCNC: 25 MG/DL (ref 8–23)
CALCIUM SERPL-MCNC: 8.8 MG/DL (ref 8.8–10.2)
CHLORIDE BLD-SCNC: 106 MMOL/L (ref 98–111)
CLARITY: CLEAR
CO2: 22 MMOL/L (ref 22–29)
COLOR: YELLOW
CREAT SERPL-MCNC: 1.3 MG/DL (ref 0.5–0.9)
CRYSTALS, UA: ABNORMAL /HPF
EKG P AXIS: 21 DEGREES
EKG P-R INTERVAL: 150 MS
EKG Q-T INTERVAL: 448 MS
EKG QRS DURATION: 122 MS
EKG QTC CALCULATION (BAZETT): 455 MS
EKG T AXIS: 26 DEGREES
EOSINOPHILS ABSOLUTE: 0.1 K/UL (ref 0–0.6)
EOSINOPHILS RELATIVE PERCENT: 1.9 % (ref 0–5)
EPITHELIAL CELLS, UA: 8 /HPF (ref 0–5)
GFR AFRICAN AMERICAN: 49
GFR NON-AFRICAN AMERICAN: 40
GLUCOSE BLD-MCNC: 79 MG/DL (ref 74–109)
GLUCOSE URINE: NEGATIVE MG/DL
HBA1C MFR BLD: 5.8 % (ref 4–6)
HCT VFR BLD CALC: 36.5 % (ref 37–47)
HEMOGLOBIN: 11.3 G/DL (ref 12–16)
HYALINE CASTS: 1 /HPF (ref 0–8)
IMMATURE GRANULOCYTES #: 0 K/UL
INR BLD: 4.77 (ref 0.88–1.18)
KETONES, URINE: ABNORMAL MG/DL
LEUKOCYTE ESTERASE, URINE: ABNORMAL
LYMPHOCYTES ABSOLUTE: 1.4 K/UL (ref 1.1–4.5)
LYMPHOCYTES RELATIVE PERCENT: 30.4 % (ref 20–40)
MCH RBC QN AUTO: 28.7 PG (ref 27–31)
MCHC RBC AUTO-ENTMCNC: 31 G/DL (ref 33–37)
MCV RBC AUTO: 92.6 FL (ref 81–99)
MONOCYTES ABSOLUTE: 0.7 K/UL (ref 0–0.9)
MONOCYTES RELATIVE PERCENT: 14 % (ref 0–10)
MRSA SCREEN RT-PCR: NOT DETECTED
NEUTROPHILS ABSOLUTE: 2.5 K/UL (ref 1.5–7.5)
NEUTROPHILS RELATIVE PERCENT: 53.1 % (ref 50–65)
NITRITE, URINE: NEGATIVE
PDW BLD-RTO: 16.1 % (ref 11.5–14.5)
PH UA: 6.5 (ref 5–8)
PLATELET # BLD: 139 K/UL (ref 130–400)
PMV BLD AUTO: 13.3 FL (ref 9.4–12.3)
POTASSIUM SERPL-SCNC: 5.1 MMOL/L (ref 3.5–5)
PROTEIN UA: 30 MG/DL
PROTHROMBIN TIME: 43.4 SEC (ref 12–14.6)
RBC # BLD: 3.94 M/UL (ref 4.2–5.4)
RBC UA: 1 /HPF (ref 0–4)
SODIUM BLD-SCNC: 140 MMOL/L (ref 136–145)
SPECIFIC GRAVITY UA: 1.02 (ref 1–1.03)
TOTAL PROTEIN: 7.9 G/DL (ref 6.6–8.7)
UROBILINOGEN, URINE: 1 E.U./DL
WBC # BLD: 4.7 K/UL (ref 4.8–10.8)
WBC UA: 5 /HPF (ref 0–5)

## 2022-03-29 PROCEDURE — 85610 PROTHROMBIN TIME: CPT

## 2022-03-29 PROCEDURE — 86900 BLOOD TYPING SEROLOGIC ABO: CPT

## 2022-03-29 PROCEDURE — 87641 MR-STAPH DNA AMP PROBE: CPT

## 2022-03-29 PROCEDURE — 81001 URINALYSIS AUTO W/SCOPE: CPT

## 2022-03-29 PROCEDURE — 83036 HEMOGLOBIN GLYCOSYLATED A1C: CPT

## 2022-03-29 PROCEDURE — 93005 ELECTROCARDIOGRAM TRACING: CPT | Performed by: ORTHOPAEDIC SURGERY

## 2022-03-29 PROCEDURE — 71046 X-RAY EXAM CHEST 2 VIEWS: CPT

## 2022-03-29 PROCEDURE — 85025 COMPLETE CBC W/AUTO DIFF WBC: CPT

## 2022-03-29 PROCEDURE — 80053 COMPREHEN METABOLIC PANEL: CPT

## 2022-03-29 PROCEDURE — 93010 ELECTROCARDIOGRAM REPORT: CPT | Performed by: INTERNAL MEDICINE

## 2022-03-29 PROCEDURE — 85730 THROMBOPLASTIN TIME PARTIAL: CPT

## 2022-03-29 PROCEDURE — 86850 RBC ANTIBODY SCREEN: CPT

## 2022-03-29 PROCEDURE — 86901 BLOOD TYPING SEROLOGIC RH(D): CPT

## 2022-03-29 RX ORDER — PREGABALIN 75 MG/1
75 CAPSULE ORAL ONCE
Status: CANCELLED | OUTPATIENT
Start: 2022-04-18

## 2022-03-29 RX ORDER — CELECOXIB 200 MG/1
200 CAPSULE ORAL ONCE
Status: CANCELLED | OUTPATIENT
Start: 2022-04-18

## 2022-03-29 RX ORDER — OXYCODONE HCL 10 MG/1
10 TABLET, FILM COATED, EXTENDED RELEASE ORAL ONCE
Status: CANCELLED | OUTPATIENT
Start: 2022-04-18

## 2022-03-29 RX ORDER — ACETAMINOPHEN 500 MG
1000 TABLET ORAL ONCE
Status: CANCELLED | OUTPATIENT
Start: 2022-04-18

## 2022-03-31 DIAGNOSIS — K52.9 GASTROENTERITIS: ICD-10-CM

## 2022-03-31 RX ORDER — ONDANSETRON 4 MG/1
4 TABLET, FILM COATED ORAL EVERY 8 HOURS PRN
Qty: 30 TABLET | Refills: 1 | Status: SHIPPED | OUTPATIENT
Start: 2022-03-31

## 2022-03-31 NOTE — TELEPHONE ENCOUNTER
Roslyn Cortes called requesting a refill of the below medication which has been pended for you:     Requested Prescriptions     Pending Prescriptions Disp Refills    ondansetron (ZOFRAN) 4 MG tablet [Pharmacy Med Name: ONDANSETRON HYDROCHLORIDE 4 MG Tablet] 30 tablet 1     Sig: Take 1 tablet by mouth every 8 hours as needed for Nausea       Last Appointment Date: 1/18/2022  Next Appointment Date: 4/19/2022    Allergies   Allergen Reactions    Insect Extract Allergy Skin Test Anaphylaxis     Bee stings    Lactose Intolerance (Gi)     Prednisone      Headache and upset stomach    Zanaflex [Tizanidine Hcl]      Passed out lost control of body functions    Lortab [Hydrocodone-Acetaminophen] Nausea And Vomiting     Sweats, weak, nausea and vomiting    Other Nausea And Vomiting     Opiates------sweating, weak        Oxycodone-Acetaminophen Nausea And Vomiting     Sweating and vomiting     Ultram [Tramadol Hcl] Nausea And Vomiting     Sweating, weak, nausea and vomiting

## 2022-04-01 ENCOUNTER — PATIENT MESSAGE (OUTPATIENT)
Dept: INTERNAL MEDICINE | Age: 72
End: 2022-04-01

## 2022-04-01 DIAGNOSIS — R05.9 COUGH: ICD-10-CM

## 2022-04-01 RX ORDER — MONTELUKAST SODIUM 10 MG/1
10 TABLET ORAL DAILY
Qty: 90 TABLET | Refills: 0 | Status: SHIPPED | OUTPATIENT
Start: 2022-04-01 | End: 2022-04-07 | Stop reason: SDUPTHER

## 2022-04-01 NOTE — TELEPHONE ENCOUNTER
From: Tulio Vyas  To: Dr. Monty Webb: 4/1/2022 1:25 PM CDT  Subject: persistent coughing    Dr. Chai Cunningham put me on singular for coughing so much. It was for 10 days. I finished taking them and now my cough is coming back. Also my INR is 4.7.

## 2022-04-05 SDOH — HEALTH STABILITY: PHYSICAL HEALTH: ON AVERAGE, HOW MANY DAYS PER WEEK DO YOU ENGAGE IN MODERATE TO STRENUOUS EXERCISE (LIKE A BRISK WALK)?: 0 DAYS

## 2022-04-05 SDOH — HEALTH STABILITY: PHYSICAL HEALTH: ON AVERAGE, HOW MANY MINUTES DO YOU ENGAGE IN EXERCISE AT THIS LEVEL?: 0 MIN

## 2022-04-05 ASSESSMENT — PATIENT HEALTH QUESTIONNAIRE - PHQ9
SUM OF ALL RESPONSES TO PHQ QUESTIONS 1-9: 23
SUM OF ALL RESPONSES TO PHQ QUESTIONS 1-9: 23
1. LITTLE INTEREST OR PLEASURE IN DOING THINGS: 3
7. TROUBLE CONCENTRATING ON THINGS, SUCH AS READING THE NEWSPAPER OR WATCHING TELEVISION: 2
8. MOVING OR SPEAKING SO SLOWLY THAT OTHER PEOPLE COULD HAVE NOTICED. OR THE OPPOSITE, BEING SO FIGETY OR RESTLESS THAT YOU HAVE BEEN MOVING AROUND A LOT MORE THAN USUAL: 3
9. THOUGHTS THAT YOU WOULD BE BETTER OFF DEAD, OR OF HURTING YOURSELF: 0
SUM OF ALL RESPONSES TO PHQ QUESTIONS 1-9: 23
3. TROUBLE FALLING OR STAYING ASLEEP: 3
5. POOR APPETITE OR OVEREATING: 3
SUM OF ALL RESPONSES TO PHQ QUESTIONS 1-9: 23
SUM OF ALL RESPONSES TO PHQ9 QUESTIONS 1 & 2: 6
6. FEELING BAD ABOUT YOURSELF - OR THAT YOU ARE A FAILURE OR HAVE LET YOURSELF OR YOUR FAMILY DOWN: 3
4. FEELING TIRED OR HAVING LITTLE ENERGY: 3
2. FEELING DOWN, DEPRESSED OR HOPELESS: 3
10. IF YOU CHECKED OFF ANY PROBLEMS, HOW DIFFICULT HAVE THESE PROBLEMS MADE IT FOR YOU TO DO YOUR WORK, TAKE CARE OF THINGS AT HOME, OR GET ALONG WITH OTHER PEOPLE: 1

## 2022-04-05 ASSESSMENT — LIFESTYLE VARIABLES
HOW OFTEN DO YOU HAVE A DRINK CONTAINING ALCOHOL: 1
HOW OFTEN DO YOU HAVE SIX OR MORE DRINKS ON ONE OCCASION: 1
HOW OFTEN DO YOU HAVE A DRINK CONTAINING ALCOHOL: NEVER

## 2022-04-06 ENCOUNTER — ANTI-COAG VISIT (OUTPATIENT)
Dept: PRIMARY CARE CLINIC | Age: 72
End: 2022-04-06
Payer: MEDICARE

## 2022-04-06 DIAGNOSIS — K52.9 GASTROENTERITIS: ICD-10-CM

## 2022-04-06 DIAGNOSIS — K21.9 GASTROESOPHAGEAL REFLUX DISEASE WITHOUT ESOPHAGITIS: ICD-10-CM

## 2022-04-06 DIAGNOSIS — Z79.01 ANTICOAGULATED ON COUMADIN: Primary | ICD-10-CM

## 2022-04-06 LAB — INR BLD: 4.7

## 2022-04-06 PROCEDURE — 93793 ANTICOAG MGMT PT WARFARIN: CPT | Performed by: INTERNAL MEDICINE

## 2022-04-07 ENCOUNTER — ANTI-COAG VISIT (OUTPATIENT)
Dept: PRIMARY CARE CLINIC | Age: 72
End: 2022-04-07
Payer: MEDICARE

## 2022-04-07 DIAGNOSIS — Z79.01 ANTICOAGULATED ON COUMADIN: Primary | ICD-10-CM

## 2022-04-07 DIAGNOSIS — R05.9 COUGH: ICD-10-CM

## 2022-04-07 LAB — INR BLD: 2.7

## 2022-04-07 PROCEDURE — 93793 ANTICOAG MGMT PT WARFARIN: CPT | Performed by: INTERNAL MEDICINE

## 2022-04-07 RX ORDER — MONTELUKAST SODIUM 10 MG/1
10 TABLET ORAL DAILY
Qty: 90 TABLET | Refills: 0 | Status: SHIPPED | OUTPATIENT
Start: 2022-04-07 | End: 2022-04-19 | Stop reason: SDUPTHER

## 2022-04-07 RX ORDER — CALCITRIOL 0.25 UG/1
CAPSULE, LIQUID FILLED ORAL
Qty: 90 CAPSULE | Refills: 0 | Status: SHIPPED | OUTPATIENT
Start: 2022-04-07 | End: 2022-07-06

## 2022-04-07 RX ORDER — SUCRALFATE 1 G/1
TABLET ORAL
Qty: 360 TABLET | Refills: 0 | Status: SHIPPED | OUTPATIENT
Start: 2022-04-07 | End: 2022-07-06

## 2022-04-07 NOTE — TELEPHONE ENCOUNTER
Last Appointment Date: 1/18/2022  Next Appointment Date: 4/19/2022    Allergies   Allergen Reactions    Insect Extract Allergy Skin Test Anaphylaxis     Bee stings    Lactose Intolerance (Gi)     Prednisone      Headache and upset stomach    Zanaflex [Tizanidine Hcl]      Passed out lost control of body functions    Lortab [Hydrocodone-Acetaminophen] Nausea And Vomiting     Sweats, weak, nausea and vomiting    Other Nausea And Vomiting     Opiates------sweating, weak        Oxycodone-Acetaminophen Nausea And Vomiting     Sweating and vomiting     Ultram [Tramadol Hcl] Nausea And Vomiting     Sweating, weak, nausea and vomiting         Patient needs refill on   Requested Prescriptions     Pending Prescriptions Disp Refills    sucralfate (CARAFATE) 1 GM tablet [Pharmacy Med Name: SUCRALFATE 1 GM Tablet] 360 tablet 0     Sig: TAKE 1 TABLET FOUR TIMES DAILY    calcitRIOL (ROCALTROL) 0.25 MCG capsule [Pharmacy Med Name: CALCITRIOL 0.25 MCG Capsule] 90 capsule 0     Sig: TAKE 1 CAPSULE EVERY DAY

## 2022-04-07 NOTE — PROGRESS NOTES
HOME MONITORING REPORT    INR today:   Results for orders placed or performed in visit on 04/07/22   Protime-INR   Result Value Ref Range    INR 2.70        INR Goal: 2.0-3.0    Dosing Plan  As of 4/7/2022    TTR:  26.5 % (3.9 y)   Full warfarin instructions:  7.5 mg every Mon, Wed, Fri; 15 mg all other days              PLAN: Advised patient/caregiver to continue current dose and recheck in one week. Patient/Caregiver voiced understanding      Electronically signed by Hector Gould MD on 4/7/2022 at 3:22 PM    I have reviewed nursing plan for Coumadin management and agree with plan.

## 2022-04-11 DIAGNOSIS — E11.21 TYPE II DIABETES MELLITUS WITH NEPHROPATHY (HCC): ICD-10-CM

## 2022-04-11 DIAGNOSIS — E55.9 VITAMIN D DEFICIENCY: ICD-10-CM

## 2022-04-11 LAB
ALBUMIN SERPL-MCNC: 4.4 G/DL (ref 3.5–5.2)
ALP BLD-CCNC: 83 U/L (ref 35–104)
ALT SERPL-CCNC: 11 U/L (ref 5–33)
ANION GAP SERPL CALCULATED.3IONS-SCNC: 15 MMOL/L (ref 7–19)
AST SERPL-CCNC: 22 U/L (ref 5–32)
BASOPHILS ABSOLUTE: 0 K/UL (ref 0–0.2)
BASOPHILS RELATIVE PERCENT: 0.6 % (ref 0–1)
BILIRUB SERPL-MCNC: 0.8 MG/DL (ref 0.2–1.2)
BUN BLDV-MCNC: 22 MG/DL (ref 8–23)
CALCIUM SERPL-MCNC: 9.6 MG/DL (ref 8.8–10.2)
CHLORIDE BLD-SCNC: 104 MMOL/L (ref 98–111)
CHOLESTEROL, TOTAL: 224 MG/DL (ref 160–199)
CO2: 22 MMOL/L (ref 22–29)
CREAT SERPL-MCNC: 1.3 MG/DL (ref 0.5–0.9)
CREATININE URINE: 69.3 MG/DL (ref 4.2–622)
EOSINOPHILS ABSOLUTE: 0.1 K/UL (ref 0–0.6)
EOSINOPHILS RELATIVE PERCENT: 2.5 % (ref 0–5)
GFR AFRICAN AMERICAN: 49
GFR NON-AFRICAN AMERICAN: 40
GLUCOSE BLD-MCNC: 89 MG/DL (ref 74–109)
HBA1C MFR BLD: 5.7 % (ref 4–6)
HCT VFR BLD CALC: 39 % (ref 37–47)
HDLC SERPL-MCNC: 95 MG/DL (ref 65–121)
HEMOGLOBIN: 12.2 G/DL (ref 12–16)
IMMATURE GRANULOCYTES #: 0 K/UL
LDL CHOLESTEROL CALCULATED: 112 MG/DL
LYMPHOCYTES ABSOLUTE: 1.9 K/UL (ref 1.1–4.5)
LYMPHOCYTES RELATIVE PERCENT: 40.1 % (ref 20–40)
MCH RBC QN AUTO: 28.6 PG (ref 27–31)
MCHC RBC AUTO-ENTMCNC: 31.3 G/DL (ref 33–37)
MCV RBC AUTO: 91.3 FL (ref 81–99)
MICROALBUMIN UR-MCNC: 1.9 MG/DL (ref 0–19)
MICROALBUMIN/CREAT UR-RTO: 27.4 MG/G
MONOCYTES ABSOLUTE: 0.6 K/UL (ref 0–0.9)
MONOCYTES RELATIVE PERCENT: 12.1 % (ref 0–10)
NEUTROPHILS ABSOLUTE: 2.1 K/UL (ref 1.5–7.5)
NEUTROPHILS RELATIVE PERCENT: 44.5 % (ref 50–65)
PDW BLD-RTO: 16 % (ref 11.5–14.5)
PLATELET # BLD: 194 K/UL (ref 130–400)
PMV BLD AUTO: 12 FL (ref 9.4–12.3)
POTASSIUM SERPL-SCNC: 4.4 MMOL/L (ref 3.5–5)
RBC # BLD: 4.27 M/UL (ref 4.2–5.4)
SODIUM BLD-SCNC: 141 MMOL/L (ref 136–145)
TOTAL PROTEIN: 7.8 G/DL (ref 6.6–8.7)
TRIGL SERPL-MCNC: 84 MG/DL (ref 0–149)
TSH SERPL DL<=0.05 MIU/L-ACNC: 3.48 UIU/ML (ref 0.27–4.2)
VITAMIN B-12: 885 PG/ML (ref 211–946)
VITAMIN D 25-HYDROXY: 28.4 NG/ML
WBC # BLD: 4.8 K/UL (ref 4.8–10.8)

## 2022-04-14 ENCOUNTER — ANTI-COAG VISIT (OUTPATIENT)
Dept: PRIMARY CARE CLINIC | Age: 72
End: 2022-04-14
Payer: MEDICARE

## 2022-04-14 DIAGNOSIS — Z79.01 ANTICOAGULATED ON COUMADIN: Primary | ICD-10-CM

## 2022-04-14 LAB — INR BLD: 2.1

## 2022-04-14 PROCEDURE — 93793 ANTICOAG MGMT PT WARFARIN: CPT | Performed by: INTERNAL MEDICINE

## 2022-04-14 NOTE — PROGRESS NOTES
HOME MONITORING REPORT    INR today:   Results for orders placed or performed in visit on 04/14/22   Protime-INR   Result Value Ref Range    INR 2.10        INR Goal: 2.0-3.0    Dosing Plan  As of 4/14/2022    TTR:  26.8 % (3.9 y)   Full warfarin instructions:  7.5 mg every Mon, Wed, Fri; 15 mg all other days              PLAN: Patient aware to continue current dose and recheck in one week or as ordered. Electronically signed by Frederick Marcus MD on 4/14/2022 at 4:13 PM    I have reviewed nursing plan for Coumadin management and agree with plan.

## 2022-04-19 ENCOUNTER — OFFICE VISIT (OUTPATIENT)
Dept: INTERNAL MEDICINE | Age: 72
End: 2022-04-19
Payer: MEDICARE

## 2022-04-19 VITALS
OXYGEN SATURATION: 99 % | RESPIRATION RATE: 20 BRPM | WEIGHT: 242 LBS | HEART RATE: 69 BPM | DIASTOLIC BLOOD PRESSURE: 74 MMHG | HEIGHT: 67 IN | SYSTOLIC BLOOD PRESSURE: 135 MMHG | BODY MASS INDEX: 37.98 KG/M2

## 2022-04-19 DIAGNOSIS — J45.909 MILD REACTIVE AIRWAYS DISEASE, UNSPECIFIED WHETHER PERSISTENT: ICD-10-CM

## 2022-04-19 DIAGNOSIS — R11.0 NAUSEA: ICD-10-CM

## 2022-04-19 DIAGNOSIS — F32.89 OTHER DEPRESSION: ICD-10-CM

## 2022-04-19 DIAGNOSIS — Z86.718 HISTORY OF DVT IN ADULTHOOD: ICD-10-CM

## 2022-04-19 DIAGNOSIS — E78.5 HYPERLIPIDEMIA, UNSPECIFIED HYPERLIPIDEMIA TYPE: ICD-10-CM

## 2022-04-19 DIAGNOSIS — Z78.0 MENOPAUSE: ICD-10-CM

## 2022-04-19 DIAGNOSIS — Z12.31 ENCOUNTER FOR SCREENING MAMMOGRAM FOR MALIGNANT NEOPLASM OF BREAST: ICD-10-CM

## 2022-04-19 DIAGNOSIS — Z91.09 ENVIRONMENTAL ALLERGIES: ICD-10-CM

## 2022-04-19 DIAGNOSIS — D68.62 LUPUS ANTICOAGULANT SYNDROME (HCC): ICD-10-CM

## 2022-04-19 DIAGNOSIS — I10 PRIMARY HYPERTENSION: ICD-10-CM

## 2022-04-19 DIAGNOSIS — N18.32 STAGE 3B CHRONIC KIDNEY DISEASE (HCC): ICD-10-CM

## 2022-04-19 DIAGNOSIS — Z00.00 ROUTINE HEALTH MAINTENANCE: Primary | ICD-10-CM

## 2022-04-19 DIAGNOSIS — M54.50 CHRONIC LOW BACK PAIN, UNSPECIFIED BACK PAIN LATERALITY, UNSPECIFIED WHETHER SCIATICA PRESENT: ICD-10-CM

## 2022-04-19 DIAGNOSIS — K21.9 GASTROESOPHAGEAL REFLUX DISEASE WITHOUT ESOPHAGITIS: ICD-10-CM

## 2022-04-19 DIAGNOSIS — E55.9 VITAMIN D DEFICIENCY: ICD-10-CM

## 2022-04-19 DIAGNOSIS — G62.9 NEUROPATHY: ICD-10-CM

## 2022-04-19 DIAGNOSIS — E03.9 HYPOTHYROIDISM, UNSPECIFIED TYPE: ICD-10-CM

## 2022-04-19 DIAGNOSIS — K64.9 HEMORRHOIDS, UNSPECIFIED HEMORRHOID TYPE: ICD-10-CM

## 2022-04-19 DIAGNOSIS — E53.8 B12 DEFICIENCY: ICD-10-CM

## 2022-04-19 DIAGNOSIS — G62.9 PERIPHERAL POLYNEUROPATHY: ICD-10-CM

## 2022-04-19 DIAGNOSIS — G89.29 CHRONIC LOW BACK PAIN, UNSPECIFIED BACK PAIN LATERALITY, UNSPECIFIED WHETHER SCIATICA PRESENT: ICD-10-CM

## 2022-04-19 DIAGNOSIS — D50.9 IRON DEFICIENCY ANEMIA, UNSPECIFIED IRON DEFICIENCY ANEMIA TYPE: ICD-10-CM

## 2022-04-19 DIAGNOSIS — G47.30 SLEEP APNEA, UNSPECIFIED TYPE: ICD-10-CM

## 2022-04-19 DIAGNOSIS — R10.13 EPIGASTRIC PAIN: ICD-10-CM

## 2022-04-19 DIAGNOSIS — E11.69 TYPE 2 DIABETES MELLITUS WITH OTHER SPECIFIED COMPLICATION, UNSPECIFIED WHETHER LONG TERM INSULIN USE (HCC): ICD-10-CM

## 2022-04-19 DIAGNOSIS — H35.029: ICD-10-CM

## 2022-04-19 DIAGNOSIS — R10.84 GENERALIZED ABDOMINAL PAIN: ICD-10-CM

## 2022-04-19 PROCEDURE — 3044F HG A1C LEVEL LT 7.0%: CPT | Performed by: INTERNAL MEDICINE

## 2022-04-19 PROCEDURE — 96372 THER/PROPH/DIAG INJ SC/IM: CPT | Performed by: INTERNAL MEDICINE

## 2022-04-19 PROCEDURE — 1123F ACP DISCUSS/DSCN MKR DOCD: CPT | Performed by: INTERNAL MEDICINE

## 2022-04-19 PROCEDURE — 3017F COLORECTAL CA SCREEN DOC REV: CPT | Performed by: INTERNAL MEDICINE

## 2022-04-19 PROCEDURE — G0439 PPPS, SUBSEQ VISIT: HCPCS | Performed by: INTERNAL MEDICINE

## 2022-04-19 PROCEDURE — 4040F PNEUMOC VAC/ADMIN/RCVD: CPT | Performed by: INTERNAL MEDICINE

## 2022-04-19 RX ORDER — CYANOCOBALAMIN 1000 UG/ML
1000 INJECTION INTRAMUSCULAR; SUBCUTANEOUS ONCE
Status: COMPLETED | OUTPATIENT
Start: 2022-04-19 | End: 2022-04-19

## 2022-04-19 RX ORDER — GABAPENTIN 300 MG/1
CAPSULE ORAL
Qty: 270 CAPSULE | Refills: 0 | Status: SHIPPED | OUTPATIENT
Start: 2022-04-19 | End: 2022-10-05 | Stop reason: SDUPTHER

## 2022-04-19 RX ORDER — MONTELUKAST SODIUM 10 MG/1
10 TABLET ORAL DAILY
Qty: 90 TABLET | Refills: 3 | Status: SHIPPED | OUTPATIENT
Start: 2022-04-19 | End: 2022-10-05

## 2022-04-19 RX ORDER — HYDROCORTISONE 10 MG/G
CREAM TOPICAL
Qty: 1 EACH | Refills: 1 | Status: SHIPPED | OUTPATIENT
Start: 2022-04-19

## 2022-04-19 RX ORDER — CYANOCOBALAMIN 1000 UG/ML
INJECTION INTRAMUSCULAR; SUBCUTANEOUS
COMMUNITY
Start: 2022-03-02 | End: 2022-05-04 | Stop reason: ALTCHOICE

## 2022-04-19 RX ADMIN — CYANOCOBALAMIN 1000 MCG: 1000 INJECTION INTRAMUSCULAR; SUBCUTANEOUS at 09:29

## 2022-04-19 SDOH — ECONOMIC STABILITY: FOOD INSECURITY: WITHIN THE PAST 12 MONTHS, THE FOOD YOU BOUGHT JUST DIDN'T LAST AND YOU DIDN'T HAVE MONEY TO GET MORE.: NEVER TRUE

## 2022-04-19 SDOH — ECONOMIC STABILITY: TRANSPORTATION INSECURITY
IN THE PAST 12 MONTHS, HAS LACK OF TRANSPORTATION KEPT YOU FROM MEETINGS, WORK, OR FROM GETTING THINGS NEEDED FOR DAILY LIVING?: NO

## 2022-04-19 SDOH — ECONOMIC STABILITY: FOOD INSECURITY: WITHIN THE PAST 12 MONTHS, YOU WORRIED THAT YOUR FOOD WOULD RUN OUT BEFORE YOU GOT MONEY TO BUY MORE.: SOMETIMES TRUE

## 2022-04-19 ASSESSMENT — SOCIAL DETERMINANTS OF HEALTH (SDOH): HOW HARD IS IT FOR YOU TO PAY FOR THE VERY BASICS LIKE FOOD, HOUSING, MEDICAL CARE, AND HEATING?: SOMEWHAT HARD

## 2022-04-19 NOTE — PROGRESS NOTES
Chief Complaint   Patient presents with    Medicare AWV    Abdominal Pain     Patient complains of RLQ abdominal pain for a month. She also has some burning in her epigastric area.  Peripheral Neuropathy     She complains of numbness and tingling in her hands and feet. She has says her feet burn.  Hemorrhoids     She has problems with hemorrhoids. HPI: Lorrie Wilson is a 66 y.o. female is here for her annual Medicare wellness exam.  Her functional status is fair. She has not had any recent falls. She has no concerns in regards to safety, hearing, or cognition. She is probably going to be having knee surgery in July. She follows up with Dr. Zada Schlatter to further discuss this in July. Dr. Yoana Kurtz is her cardiologist.  Dr. Berto Slater is her nephrologist.  She sees Dr. Candy Cook for history of sleep apnea. Her renal parameters are stable. Her A1c is at 5.7. Her allergies are stable her current dose of Singulair. She does have some difficulty with peripheral neuropathy. She is taking her gabapentin as prescribed. She is agreeable to increasing her gabapentin 300 mg p.o. 3 times daily as needed. Her blood pressure is well controlled. Her blood sugars are running between 70 and 90. Her cholesterol has improved with her current medication regimen. Her acid reflux is stable. However, she has been having some difficulty with right lower quadrant abdominal pain for approximately 1 month. She does complain of some burning in her epigastric area. She has been advised to take her Carafate as prescribed. Her vitamin D level is within normal limits. She does take baclofen as needed for back pain. She is due for a B12 injection today and will get this for her. Her depression is stable on Lexapro. Her anemia is stable. She is not having any difficulty with her breathing. Her thyroid functions within normal limits.     She states that Dr. Zada Schlatter is concerned about her having a [place] filter in place for over 20 years. She states that it was put in well over 20 years ago. She also states that Dr. Kim De was concerned about why she has had blood clots while on Coumadin therapy. 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 She also has had a history of noncompliance with her medication regimens in the past.  She does complain of right lower quadrant pain for approximately 1 month. Her gallbladder and appendix have been removed. She denies any complaints of chest pain, chest pressure or shortness of breath. Does complain of some hemorrhoidal issues. We can send her in some Proctosol cream for this.     Routine screening is as follows:  Eye exam yearly per Dr. Arlette Lind  Flu Vaccine is up-to-date  Pap: Declined  Mammogram was done in June 2021  Colonoscopy May 2021  DEXA ordered  Pneumovax is up-to-date    Past Medical History:   Diagnosis Date    Abdominal pain     Abnormal EKG     Acute sinusitis     Acute superficial venous thrombosis of left lower extremity     Allergic reaction to spider bite     Anemia     Anticoagulated     Anxiety     Arrhythmia     Asthmatic bronchitis without complication 0/39/7152    Ataxic gait     Lyons's esophagus     Bowel obstruction ([address]     Burn of abdomen wall, second degree, initial encounter 8/27/2018    Callus     Cardiac pacemaker     Cerebral artery occlusion with cerebral infarction ([address]     CKD (chronic kidney disease) stage 2, GFR 60-89 ml/min     Coat's syndrome     Coat's syndrome     both eyes    COPD (chronic obstructive pulmonary disease) ([address]     Depression     Diabetes mellitus type 2 in nonobese ([address]     Diabetic nephropathy (HCC)     Disequilibrium     Dizziness     D[facility] thrombosis) [facility]     Exudative retinopathy     Fibromyalgia     Fibromyositis     Gastric ulcer     GERD (gastroesophageal reflux disease)  History of gastric bypass     Hx of blood clots     Hx of lupus anticoagulant disorder     Hyperlipidemia 5/6/2019    Hypertension     Hypothyroidism     Intermittent claudication (HCC)     Intestinal obstruction (HCC)     Iron deficiency     Left-sided weakness     Low vitamin D level     Lupus (HCC)     Menopause     Obesity     Osteoarthritis     Osteoporosis     Other iron deficiency anemias     Palliative care patient 09/24/2020    Pernicious anemia     PONV (postoperative nausea and vomiting)     PUPP (pruritic urticarial papules and plaques of pregnancy)     Restless legs syndrome (RLS) 7/11/2019    Right leg numbness     Right sided sciatica     Sarcoidosis     with liver involvement    Sciatica     Secondary hyperparathyroidism ([address]     Shingles     Shortness of breath     Sleep apnea     Stomach ulcer     Syncope     Visual loss, one eye       Past Surgical History:   Procedure Laterality Date    APPENDECTOMY      CARDIAC CATHETERIZATION  10/21/13  [facility]    EF over 60%    CHOLECYSTECTOMY      COLONOSCOPY  02/2010    negative    COLONOSCOPY  02/22/2010    Dr Shakira Smith    COLONOSCOPY  04/01/2016    Dr LAKHWINDER Horvath-internal hemorrhoids, 5 yr recall    COLONOSCOPY N/A 05/07/2021    Dr Usama Adkins looping/tortuosity throughout the left colon, BE=Normal    DILATATION, ESOPHAGUS      EYE SURGERY      Cyst on Right eye    EYE SURGERY      GASTRIC BYPASS SURGERY      GASTRIC BYPASS SURGERY      HERNIA REPAIR      HYSTERECTOMY      Complete    HYSTERECTOMY      Partial - because had a tubal pregnancy.      INCONTINENCE SURGERY      Bladder Sling    INFECTED SKIN DEBRIDEMENT Right     dog bite right forearm    OTHER SURGICAL HISTORY      IVC filter    PACEMAKER INSERTION      PACEMAKER PLACEMENT      medtronic    [place] TOTAL KNEE ARTHROPLASTY Right 03/26/2018    TOTAL KNEE REPLACEMENT COMPLEX PRIMARY performed by Sohail Palacios MD at [address] [place] [address] SURGERY      SMALL INTESTINE SURGERY      SPLENECTOMY      KRISTEL AND BSO      TONSILLECTOMY AND ADENOIDECTOMY      TOTAL KNEE ARTHROPLASTY      UPPER GASTROINTESTINAL ENDOSCOPY  12/2011    gerd s/p gastric bypass    UPPER GASTROINTESTINAL ENDOSCOPY  02/2014    normal s/p gastric bypass    UPPER GASTROINTESTINAL ENDOSCOPY  02/2010    biopsy neg Barretts, chronic reflux esophagitis s/p gastric bypass    UPPER GASTROINTESTINAL ENDOSCOPY  07/2006    unremarkable s/p gastric bypass    UPPER GASTROINTESTINAL ENDOSCOPY  08/10/2015    Dr Rashawn Landin UPPER GASTROINTESTINAL ENDOSCOPY N/A 04/01/2016    Dr. Salma Lin:  H Pylori(-), HH, o/w normal    UPPER GASTROINTESTINAL ENDOSCOPY N/A 05/07/2021    Dr Lawson [facility] hiatal hernia, previous gastric bypass surgery, gastritis    VENA CAVA FILTER PLACEMENT Right 2003      Social History     Socioeconomic History    Marital status:      Spouse name: None    Number of children: 1    Years of education: None    Highest education level: None   Occupational History     Employer: [employer]    Tobacco Use    Smoking status: Never Smoker    Smokeless tobacco: Never Used   Vaping Use    Vaping Use: Never used   Substance and Sexual Activity    Alcohol use: No    Drug use: No    Sexual activity: Not Currently   Other Topics Concern    None   Social History Narrative    Lives with daughter, son-in-law, granddaughter, niece. Drives very little, daughter usually transports pt. Works part time at [employer]. Has pet dog. Social Determinants of Health     Financial Resource Strain: Medium Risk    Difficulty of Paying Living Expenses: Somewhat hard   Food Insecurity: Food Insecurity Present    Worried About Running Out of Food in the Last Year: Sometimes true    Cher of Food in the Last Year: Never true   Transportation Needs: No Transportation Needs    Lack of Transportation (Medical): No    Lack of Transportation (Non-Medical):  No   Physical Activity: Inactive    Days of Exercise per Week: 0 days    Minutes of Exercise per Session: 0 min   Stress:     Feeling of Stress : Not on file   Social Connections:     Frequency of Communication with Friends and Family: Not on file    Frequency of Social Gatherings with Friends and Family: Not on file    Attends Orthodox Services: Not on file    Active Member of Clubs or Organizations: Not on file    Attends Club or Organization Meetings: Not on file    Marital Status: Not on file   Intimate Partner Violence:     Fear of Current or Ex-Partner: Not on file    Emotionally Abused: Not on file    Physically Abused: Not on file    Sexually Abused: Not on file   Housing Stability:     Unable to Pay for Housing in the Last Year: Not on file    Number of Places Lived in the Last Year: Not on file    Unstable Housing in the Last Year: Not on file      Family History   Problem Relation Age of Onset    Uterine Cancer Mother     Cervical Cancer Mother     Coronary Art Dis Mother     Heart Disease Father     Lung Cancer Father     Other Father         renal failure    Colon Cancer Brother     Colon Polyps Brother     Uterine Cancer Maternal Grandmother     Cervical Cancer Maternal Grandmother     Diabetes Maternal Grandmother     Cervical Cancer Sister     Other Sister         fibromyalgia    Diabetes Paternal Aunt     Breast Cancer Maternal Cousin     Esophageal Cancer Neg Hx     Liver Cancer Neg Hx     Liver Disease Neg Hx     Stomach Cancer Neg Hx     Rectal Cancer Neg Hx         Current Outpatient Medications   Medication Sig Dispense Refill    Alcohol Swabs (B-D SINGLE USE SWABS REGULAR) PADS TID E11.21 300 each 3    Blood Glucose Calibration (TRUE METRIX LEVEL 1) Low SOLN TID E11.21 3 each 3    montelukast (SINGULAIR) 10 MG tablet Take 1 tablet by mouth daily 90 tablet 3    Hydrocortisone, Perianal, (PROCTO-CASPER) 1 % cream Apply topically 2 times daily.  1 each 1    gabapentin (NEURONTIN) 300 MG capsule TAKE 1 CAPSULE BY MOUTH THREE TIMES DAILY 270 capsule 0    Semaglutide,0.25 or 0.5MG/DOS, 2 MG/1.5ML SOPN Inject 0.25 mg into the skin once a week 3 pen 1    sucralfate (CARAFATE) 1 GM tablet TAKE 1 TABLET FOUR TIMES DAILY 360 tablet 0    calcitRIOL (ROCALTROL) 0.25 MCG capsule TAKE 1 CAPSULE EVERY DAY 90 capsule 0    ondansetron (ZOFRAN) 4 MG tablet Take 1 tablet by mouth every 8 hours as needed for Nausea 30 tablet 1    baclofen (LIORESAL) 10 MG tablet Take 1 tablet by mouth 3 times daily As needed for hip pain (Patient taking differently: Take 10 mg by mouth 3 times daily as needed As needed for hip pain) 30 tablet 1    Cyanocobalamin 1000 MCG/ML KIT Inject 1 mL as directed every 30 days 3 kit 1    calcipotriene (DOVONEX) 0.005 % cream Use topically as needed (Patient taking differently: Apply 1 Dose topically 2 times daily as needed Use topically as needed) 3 each 1    escitalopram (LEXAPRO) 20 MG tablet Take 1 tablet by mouth daily 90 tablet 3    ferrous gluconate (FERGON) 240 (27 Fe) MG tablet Take 1 tablet by mouth 2 times daily 180 tablet 2    tiotropium (SPIRIVA RESPIMAT) 2.5 MCG/ACT AERS inhaler Inhale 2 puffs into the lungs daily 3 each 1    bumetanide (BUMEX) 2 MG tablet Take 1 tablet by mouth daily 30 tablet 5    levothyroxine (SYNTHROID) 100 MCG tablet Take 1 tablet by mouth Daily 90 tablet 3    potassium chloride (KLOR-CON) 10 MEQ extended release tablet Take 1 tablet by mouth daily 60 tablet 5    warfarin (COUMADIN) 7.5 MG tablet 7.5mg on Monday/Wed/Fri and 15mg all other days (Patient taking differently: Take 7.5 mg by mouth daily 7.5mg on Monday/Wed/Fri and 15mg all other days) 180 tablet 3    pantoprazole (PROTONIX) 40 MG tablet TAKE 1 TABLET BY MOUTH TWICE DAILY BEFORE MEAL(S) (Patient taking differently: Take 40 mg by mouth in the morning and at bedtime TAKE 1 TABLET BY MOUTH TWICE DAILY BEFORE MEAL(S)) 180 tablet 1    rosuvastatin (CRESTOR) 5 MG tablet Take 1 tablet by mouth daily 30 tablet 5    clobetasol (TEMOVATE) 0.05 % cream Apply topically 2 times daily. 3 each 2    lisinopril (PRINIVIL;ZESTRIL) 40 MG tablet Take 1 tablet by mouth once daily (Patient taking differently: Take 40 mg by mouth daily Take 1 tablet by mouth once daily) 90 tablet 1    CPAP Machine MISC Inhale 1 each into the lungs nightly      fexofenadine (ALLEGRA) 180 MG tablet Take 1 tablet by mouth daily Indications:  Allergic Rhinitis 90 tablet 3    Multiple Vitamins-Minerals (CENTRUM ADULTS) TABS Take 1 tablet by mouth daily      aspirin 81 MG tablet Take 81 mg by mouth daily      cyanocobalamin 1000 MCG/ML injection        Current Facility-Administered Medications   Medication Dose Route Frequency Provider Last Rate Last Admin    cyanocobalamin injection 1,000 mcg  1,000 mcg IntraMUSCular Once Paulino Jauregui MD            Patient Active Problem List   Diagnosis    Gastroesophageal reflux disease    History of gastric bypass    Family history of colon cancer    Fibromyalgia    Encounter for current long-term use of anticoagulants    Primary osteoarthritis of right knee    Arthritis of knee    Essential hypertension    Hyperglycemia    Complex sleep apnea syndrome    Iron deficiency anemia    Restrictive airway disease    DVT, lower extremity, proximal, acute, unspecified laterality (HCC)    History of ulcer disease    Anticoagulated on Coumadin    Postmenopausal osteoporosis    Diabetes mellitus ([address]    Pacemaker    History of DVT (deep vein thrombosis)    Lupus anticoagulant disorder (HCC)    History of pulmonary embolism    Thrombocytopenia (HCC)    Hypothyroidism    Morbid obesity due to excess calories (HCC)    Asthmatic bronchitis without complication    Gastroenteritis    Colic    Abdominal pain    Non-intractable vomiting with nausea    Severe episode of recurrent major depressive disorder, without psychotic features ([address]    Lower abdominal pain    Chronic heartburn    S/P gastric bypass    Dog bite    Cellulitis of right upper extremity    Abscess of right arm    Soft tissue infection    Skin ulcer of upper arm, with fat layer exposed ([facility]    History of Gage-en-Y gastric bypass    Hyperlipidemia    Hypersomnia    Nonrheumatic mitral valve regurgitation    Obesity, Class II, BMI 35-39.9    Peripheral edema    Restless legs syndrome (RLS)    Vitamin D deficiency    Chronic deep vein thrombosis (DVT) of proximal vein of lower extremity (HCC)    Unspecified severe protein-calorie malnutrition (HCC)    H/O systemic lupus erythematosus (SLE) (HCC)    Type II diabetes mellitus with nephropathy (HCC)    Stable angina (HCC)    Stage 3a chronic kidney disease (HCC)    Chronic renal disease, stage III ([facility] [[id number]]        Review of Systems   Constitutional: Positive for fatigue. Negative for activity change, appetite change, chills, diaphoresis, fever and unexpected weight change. HENT: Negative for congestion, ear pain, hearing loss, nosebleeds, postnasal drip, rhinorrhea, sinus pressure, sinus pain, sneezing, sore throat, tinnitus, trouble swallowing and voice change. Eyes: Negative for discharge and itching. Respiratory: Negative for apnea, cough, chest tightness, shortness of breath, wheezing and stridor. Cardiovascular: Positive for leg swelling. Negative for chest pain and palpitations. Left leg swelling; chronic secondary to DVT. Gastrointestinal: Positive for abdominal pain. Negative for abdominal distention, blood in stool, constipation, diarrhea, nausea and vomiting. Right lower quadrant abdominal pain. Epigastric burning   Endocrine: Negative for cold intolerance, heat intolerance, polydipsia, polyphagia and polyuria. Genitourinary: Negative for difficulty urinating, dysuria, flank pain, frequency, hematuria and urgency. Musculoskeletal: Positive for arthralgias and back pain.  Negative for gait problem, joint swelling, myalgias, neck pain and neck stiffness. She had bilateral knee pain. Right hip pain with some right-sided back pain radiating to her abdomen. She does have an upcoming appointment with orthopedics for osteoarthritis of the knee. Skin: Negative for color change, pallor, rash and wound. Allergic/Immunologic: Positive for environmental allergies. Negative for food allergies and immunocompromised state. Neurological: Negative for dizziness, tremors, seizures, syncope, facial asymmetry, speech difficulty, weakness, light-headedness, numbness and headaches. Numbness and tingling to her lower extremities   Hematological: Negative for adenopathy. Does not bruise/bleed easily. Psychiatric/Behavioral: Positive for dysphoric mood. Negative for agitation, confusion, decreased concentration and hallucinations. The patient is not nervous/anxious and is not hyperactive. Mood has improved       /74 (Site: Left Upper Arm, Position: Sitting)   Pulse 69   Resp 20   Ht 5' 7\" (1.702 m)   Wt 242 lb (109.8 kg)   SpO2 99%   BMI 37.90 kg/m²   Physical Exam  Vitals and nursing note reviewed. Constitutional:       General: She is not in acute distress. Appearance: Normal appearance. She is well-developed. She is obese. She is not ill-appearing, toxic-appearing or diaphoretic. HENT:      Head: Normocephalic and atraumatic. Right Ear: Tympanic membrane, ear canal and external ear normal. There is no impacted cerumen. Left Ear: Tympanic membrane, ear canal and external ear normal.      Nose: Nose normal. No congestion or rhinorrhea. Mouth/Throat:      Mouth: Mucous membranes are moist.      Pharynx: Oropharynx is clear. No oropharyngeal exudate or posterior oropharyngeal erythema. Eyes:      General: No scleral icterus. Right eye: No discharge. Left eye: No discharge. Extraocular Movements: Extraocular movements intact. Conjunctiva/sclera: Conjunctivae normal.      Pupils: Pupils are equal, round, and reactive to light. Neck:      Thyroid: No thyromegaly. Vascular: No carotid bruit or JVD. Trachea: No tracheal deviation. Cardiovascular:      Rate and Rhythm: Normal rate and regular rhythm. Pulses: Normal pulses. Heart sounds: Normal heart sounds. No murmur heard. No friction rub. No gallop. Pulmonary:      Effort: Pulmonary effort is normal. No respiratory distress. Breath sounds: Normal breath sounds. No stridor. No wheezing, rhonchi or rales. Chest:      Chest wall: No tenderness. Abdominal:      General: Abdomen is flat. Bowel sounds are normal. There is no distension. Palpations: Abdomen is soft. There is no mass. Tenderness: There is abdominal tenderness. There is no right CVA tenderness, left CVA tenderness, guarding or rebound. Hernia: No hernia is present. Comments: There is some tenderness in the mid epigastric area and over her lower right quadrant. Musculoskeletal:         General: Tenderness present. No swelling, deformity or signs of injury. Normal range of motion. Cervical back: Normal range of motion. No rigidity. No muscular tenderness. Lumbar back: Spasms present. No swelling or bony tenderness. Normal range of motion. Back:       Right lower leg: No edema. Left lower leg: No edema. Comments:  Does have some hip pain on palpation of the left hip she also has calf pain and tenderness on palpation of the right calf. She does have some edema. There is no erythema noted.     Visual inspection:  Deformity/amputation: absent  Skin lesions/pre-ulcerative calluses: present to bottom of the left foot  Edema: right- negative, left- negative    Sensory exam:  Monofilament sensation: normal  (minimum of 5 random plantar locations tested, avoiding callused areas - > 1 area with absence of sensation is + for neuropathy)    Plus at least one of the following:  Pulses: normal,   Pinprick: Intact  Proprioception: N/A  Vibration (128 Hz): N/A   Lymphadenopathy:      Cervical: No cervical adenopathy. Skin:     General: Skin is warm and dry. Capillary Refill: Capillary refill takes less than 2 seconds. Coloration: Skin is not jaundiced or pale. Findings: No bruising, erythema, lesion or rash. Neurological:      General: No focal deficit present. Mental Status: She is alert and oriented to person, place, and time. Mental status is at baseline. Cranial Nerves: No cranial nerve deficit. Sensory: No sensory deficit. Motor: No weakness or abnormal muscle tone. Coordination: Coordination normal.      Gait: Gait normal.      Deep Tendon Reflexes: Reflexes normal.   Psychiatric:         Mood and Affect: Mood normal. Mood is not anxious or depressed. Behavior: Behavior normal.         Thought Content: Thought content normal.         Judgment: Judgment normal.         1. Routine health maintenance    2. B12 deficiency    3. Encounter for screening mammogram for malignant neoplasm of breast    4. Menopause    5. Neuropathy    6. Generalized abdominal pain    7. Type 2 diabetes mellitus with other specified complication, unspecified whether long term insulin use ([facility]    8. Vitamin D deficiency     9. Stage 3b chronic kidney disease ([facility]    10. Environmental allergies    11. Hemorrhoids, unspecified hemorrhoid type    12. Peripheral polyneuropathy    13. Epigastric pain    14. Vitamin D deficiency    15. Nausea    16. Chronic low back pain, unspecified back pain laterality, unspecified whether sciatica present    17. Other depression    18. Iron deficiency anemia, unspecified iron deficiency anemia type    19. Mild reactive airways disease, unspecified whether persistent    20. Primary hypertension    21. Hypothyroidism, unspecified type    22. Coats' disease, unspecified laterality    21.  Lupus anticoagulant syndrome ([facility]    24. History of DVT in adulthood    22. Gastroesophageal reflux disease without esophagitis    26. Hyperlipidemia, unspecified hyperlipidemia type    27. Sleep apnea, unspecified type        ASSESSMENT/PLAN:    18-year-old woman here for her Medicare wellness exam    1. Health maintenance: Routine screening is as per HPI. Labs discussed with patient today    2. Environmental allergies: Continue Singulair and Allegra as prescribed    3. Hemorrhoids: Proctosol cream ordered    4. Peripheral neuropathy: B12 injection given today. Patient does have a history of B12 deficiency. Increase gabapentin to 300 mg p.o. 3 times daily as needed    5. Type 2 diabetes: Continue Ozempic. Her A1c is at goal    6. Epigastric pain: Continue Carafate. We are going to order a CT of her abdomen given complaints of right lower quadrant abdominal pain and generalized lower abdominal pain. If symptoms persist, she is going to have to get back with GI. Add Protonix to medication regimen    7. Vitamin D deficiency/chronic kidney disease: Continue calcitriol. Her renal parameters are stable    8. Nausea: Zofran as needed    9. Back pain baclofen as needed    10. Depression: Doing well with Lexapro    11. Anemia: Continue ferrous gluconate as prescribed    12. Reactive airways disease: Continue Spiriva    13. Hypertension: Continue Bumex as prescribed. Continue lisinopril    14. Hypothyroidism: Continue levothyroxine at current dose    15. Bernice syndrome/positive lupus anticoagulant and history of DVT: Continue Coumadin as prescribed    16. Acid reflux: Start Protonix Protonix    17. Hyperlipidemia: Continue Crestor as prescribed    18. Sleep apnea: Continue CPAP      Rajni Zaacrias was seen today for medicare awv, abdominal pain, peripheral neuropathy and hemorrhoids.     Diagnoses and all orders for this visit:    Routine health maintenance    B12 deficiency  -     cyanocobalamin injection 1,000 mcg  -     Vitamin B12; Future    Encounter for screening mammogram for malignant neoplasm of breast  -     KATELIN DIGITAL SCREEN W OR WO CAD BILATERAL; Future    Menopause  -     DEXA BONE DENSITY 2 SITES; Future    Neuropathy  -     gabapentin (NEURONTIN) 300 MG capsule; TAKE 1 CAPSULE BY MOUTH THREE TIMES DAILY    Generalized abdominal pain  -     CT ABDOMEN PELVIS W WO CONTRAST; Future    Type 2 diabetes mellitus with other specified complication, unspecified whether long term insulin use (HCC)  -     Microalbumin / Creatinine Urine Ratio; Future  -     Vitamin B12; Future  -     Vitamin D 25 Hydroxy; Future  -     TSH; Future  -     Lipid Panel; Future  -     Hemoglobin A1C; Future  -     Comprehensive Metabolic Panel; Future  -     CBC with Auto Differential; Future    Vitamin D deficiency   -     Vitamin D 25 Hydroxy; Future    Stage 3b chronic kidney disease (HCC)    Environmental allergies    Hemorrhoids, unspecified hemorrhoid type    Peripheral polyneuropathy    Epigastric pain    Vitamin D deficiency  -     Vitamin D 25 Hydroxy; Future    Nausea    Chronic low back pain, unspecified back pain laterality, unspecified whether sciatica present    Other depression    Iron deficiency anemia, unspecified iron deficiency anemia type    Mild reactive airways disease, unspecified whether persistent    Primary hypertension    Hypothyroidism, unspecified type    Coats' disease, unspecified laterality    Lupus anticoagulant syndrome ([facility] [address]    History of DVT in adulthood    Gastroesophageal reflux disease without esophagitis    Hyperlipidemia, unspecified hyperlipidemia type    Sleep apnea, unspecified type    Other orders  -     montelukast (SINGULAIR) 10 MG tablet; Take 1 tablet by mouth daily  -     Hydrocortisone, Perianal, (PROCTO-CASPER) 1 % cream; Apply topically 2 times daily.  -     Semaglutide,0.25 or 0.5MG/DOS, 2 MG/1.5ML SOPN; Inject 0.25 mg into the skin once a week          Return in about 3 months (around 7/19/2022).      Orders Placed This Encounter   Procedures    KATELIN DIGITAL SCREEN W OR WO CAD BILATERAL     Standing Status:   Future     Standing Expiration Date:   6/19/2023     Order Specific Question:   Reason for exam:     Answer:   screening breast cancer     Order Specific Question:   Does this patient have implants? Answer:   No     Order Specific Question:   Does this patient have a personal history of breast cancer? Answer:   No     Order Specific Question:   [facility] was [facility]? Answer[facility]     Order Specific Question:   When was last mammogram performed?      Answer:   6/1/2021    DEXA BONE DENSITY 2 SITES     Standing Status:   Future     Standing Expiration Date:   4/19/2023     Order Specific Question:   Reason for exam:     Answer:   screening    CT ABDOMEN PELVIS W WO CONTRAST     Standing Status:   Future     Standing Expiration Date:   4/19/2023     Order Specific Question:   Reason for exam:     Answer:   abdominal pain    Microalbumin / Creatinine Urine Ratio     Standing Status:   Future     Standing Expiration Date:   4/19/2023    Vitamin B12     Standing Status:   Future     Standing Expiration Date:   4/19/2023    Vitamin D 25 Hydroxy     Standing Status:   Future     Standing Expiration Date:   4/19/2023    TSH     Standing Status:   Future     Standing Expiration Date:   4/19/2023    Lipid Panel     Standing Status:   Future     Standing Expiration Date:   4/19/2023     Order Specific Question:   Is Patient Fasting?/# of Hours     Answer:   12    Hemoglobin A1C     Standing Status:   Future     Standing Expiration Date:   4/19/2023    Comprehensive Metabolic Panel     Standing Status:   Future     Standing Expiration Date:   4/19/2023    CBC with Auto Differential     Standing Status:   Future     Standing Expiration Date:   4/19/2023       Paco Lazar MD     Medicare Annual Wellness Visit - Subsequent    Name: Christina Martinez Date: 4/24/2022   MRN: [id number] Sex: Female   Age: 66 y.o. Ethnicity: Non- / Non    : 1950 Race: [employer] / Sulema Major is here for   Chief Complaint   Patient presents with    Medicare AWV    Abdominal Pain     Patient complains of RLQ abdominal pain for a month. She also has some burning in her epigastric area.  Peripheral Neuropathy     She complains of numbness and tingling in her hands and feet. She has says her feet burn.  Hemorrhoids     She has problems with hemorrhoids. Screenings for behavioral, psychosocial and functional/safety risks, and cognitive dysfunction are all negative except as indicated below. These results, as well as other patient data from the [address] form, are documented in Flowsheets linked to this Encounter. Allergies   Allergen Reactions    Insect Extract Allergy Skin Test Anaphylaxis     Bee stings    Tramadol      Other reaction(s): Vomiting    Lactose Intolerance (Gi)     Prednisone      Headache and upset stomach    Zanaflex [Tizanidine Hcl]      Passed out lost control of body functions    Lortab [Hydrocodone-Acetaminophen] Nausea And Vomiting     Sweats, weak, nausea and vomiting    Other Nausea And Vomiting     Opiates------sweating, weak        Oxycodone-Acetaminophen Nausea And Vomiting     Sweating and vomiting     Ultram [Tramadol Hcl] Nausea And Vomiting     Sweating, weak, nausea and vomiting         Prior to Visit Medications    Medication Sig Taking? Authorizing Provider   Alcohol Swabs (B-D SINGLE USE SWABS REGULAR) PADS TID R70.74  Frieda Resendez MD   Blood Glucose Calibration (TRUE METRIX LEVEL 1) Low SOLN TID E11.21  Frieda Resendez MD   montelukast (SINGULAIR) 10 MG tablet Take 1 tablet by mouth daily Yes Frieda Resendez MD   Hydrocortisone, Perianal, (PROCTO-CASPER) 1 % cream Apply topically 2 times daily.  Yes Frieda Resendez MD   gabapentin (NEURONTIN) 300 MG capsule TAKE 1 CAPSULE BY MOUTH THREE TIMES DAILY Yes Tiffany Crocker MD   Semaglutide,0.25 or 0.5MG/DOS, 2 MG/1.5ML SOPN Inject 0.25 mg into the skin once a week Yes Tiffany Crocker MD   sucralfate (CARAFATE) 1 GM tablet TAKE 1 TABLET FOUR TIMES DAILY Yes Tiffany Crocker MD   calcitRIOL (ROCALTROL) 0.25 MCG capsule TAKE 1 CAPSULE EVERY DAY Yes Tiffany Crocker MD   ondansetron (ZOFRAN) 4 MG tablet Take 1 tablet by mouth every 8 hours as needed for Nausea Yes Tiffany Crocker MD   baclofen (LIORESAL) 10 MG tablet Take 1 tablet by mouth 3 times daily As needed for hip pain  Patient taking differently: Take 10 mg by mouth 3 times daily as needed As needed for hip pain Yes Tiffany Crocker MD   Cyanocobalamin 1000 MCG/ML KIT Inject 1 mL as directed every 30 days Yes Tiffany Crocker MD   calcipotriene (DOVONEX) 0.005 % cream Use topically as needed  Patient taking differently: Apply 1 Dose topically 2 times daily as needed Use topically as needed Yes Tiffany Crocker MD   escitalopram (LEXAPRO) 20 MG tablet Take 1 tablet by mouth daily Yes Tiffany Crocker MD   ferrous gluconate (FERGON) 240 (27 Fe) MG tablet Take 1 tablet by mouth 2 times daily Yes Tiffany Crocker MD   tiotropium (SPIRIVA RESPIMAT) 2.5 MCG/ACT AERS inhaler Inhale 2 puffs into the lungs daily Yes Tiffany Crocker MD   bumetanide (BUMEX) 2 MG tablet Take 1 tablet by mouth daily Yes Tiffany Crocker MD   levothyroxine (SYNTHROID) 100 MCG tablet Take 1 tablet by mouth Daily Yes Tiffany Crocker MD   potassium chloride (KLOR-CON) 10 MEQ extended release tablet Take 1 tablet by mouth daily Yes Tiffany Crocker MD   warfarin (COUMADIN) 7.5 MG tablet 7.5mg on Monday/Wed/Fri and 15mg all other days  Patient taking differently: Take 7.5 mg by mouth daily 7.5mg on Monday/Wed/Fri and 15mg all other days Yes Tiffany Crocker MD   pantoprazole (PROTONIX) 40 MG tablet TAKE 1 TABLET BY MOUTH TWICE DAILY BEFORE MEAL(S)  Patient taking differently: Take 40 mg by mouth in the morning and at bedtime TAKE 1 TABLET BY MOUTH TWICE DAILY BEFORE MEAL(S) Yes Frederick Marcus MD   rosuvastatin (CRESTOR) 5 MG tablet Take 1 tablet by mouth daily Yes Frederick Marcus MD   clobetasol (TEMOVATE) 0.05 % cream Apply topically 2 times daily. Yes Frederick Marcus MD   lisinopril (PRINIVIL;ZESTRIL) 40 MG tablet Take 1 tablet by mouth once daily  Patient taking differently: Take 40 mg by mouth daily Take 1 tablet by mouth once daily Yes Frederick Marcus MD   CPAP Machine MISC Inhale 1 each into the lungs nightly Yes Historical Provider, MD   fexofenadine (ALLEGRA) 180 MG tablet Take 1 tablet by mouth daily Indications:  Allergic Rhinitis Yes Frederick Marcus MD   Multiple Vitamins-Minerals (CENTRUM ADULTS) TABS Take 1 tablet by mouth daily Yes Historical Provider, MD   aspirin 81 MG tablet Take 81 mg by mouth daily Yes Historical Provider, MD   cyanocobalamin 1000 MCG/ML injection   Historical Provider, MD       Past Medical History:   Diagnosis Date    Abdominal pain     Abnormal EKG     Acute sinusitis     Acute superficial venous thrombosis of left lower extremity     Allergic reaction to spider bite     Anemia     Anticoagulated     Anxiety     Arrhythmia     Asthmatic bronchitis without complication 5/87/2960    Ataxic gait     Lyons's esophagus     Bowel obstruction ([address]     Burn of abdomen wall, second degree, initial encounter 8/27/2018    Callus     Cardiac pacemaker     Cerebral artery occlusion with cerebral infarction ([address]     CKD (chronic kidney disease) stage 2, GFR 60-89 ml/min     Coat's syndrome     Coat's syndrome     both eyes    COPD (chronic obstructive pulmonary disease) ([address]     Depression     Diabetes mellitus type 2 in nonobese ([address]     Diabetic nephropathy (HCC)     Disequilibrium     Dizziness     [facility]     Exudative retinopathy     Fibromyalgia     Fibromyositis     Gastric ulcer     GERD (gastroesophageal reflux disease)     History of gastric bypass     Hx of blood clots     Hx of lupus anticoagulant disorder     Hyperlipidemia 5/6/2019    Hypertension     Hypothyroidism     Intermittent claudication (HCC)     Intestinal obstruction (HCC)     Iron deficiency     Left-sided weakness     Low vitamin D level     Lupus (HCC)     Menopause     Obesity     Osteoarthritis     Osteoporosis     Other iron deficiency anemias     Palliative care patient 09/24/2020    Pernicious anemia     PONV (postoperative nausea and vomiting)     PUPP (pruritic urticarial papules and plaques of pregnancy)     Restless legs syndrome (RLS) 7/11/2019    Right leg numbness     Right sided sciatica     Sarcoidosis     with liver involvement    Sciatica     Secondary hyperparathyroidism ([address]     Shingles     Shortness of breath     Sleep apnea     Stomach ulcer     Syncope     Visual loss, one eye        Past Surgical History:   Procedure Laterality Date    APPENDECTOMY      CARDIAC CAT[place]    EF over 60%    CHOLECYSTECTOMY      COLONOSCOPY  02/2010    negative    COLONOSCOPY  02/22/2010    Dr Marbin Munoz    COLONOSCOPY  04/01/2016    Dr LAKHWINDER Horvath-internal hemorrhoids, 5 yr recall    COLONOSCOPY N/A 05/07/2021    Dr Liriano Bare looping/tortuosity throughout the left colon, BE=Normal    DILATATION, ESOPHAGUS      EYE SURGERY      Cyst on Right eye    EYE SURGERY      GASTRIC BYPASS SURGERY      GASTRIC BYPASS SURGERY      HERNIA REPAIR      HYSTERECTOMY      Complete    HYSTERECTOMY      Partial - because had a tubal pregnancy.      INCONTINENCE SURGERY      Bladder Sling    INFECTED SKIN DEBRIDEMENT Right     dog bite right forearm    OTHER SURGICAL HISTORY      IVC filter    PACEMAKER INSERTION      PACEMAKER PLACEMENT      medtronic    MT TOTAL KNEE ARTHROPLASTY Right 03/26/2018    TOTAL KNEE REPLACEMENT COMPLEX PRIMARY performed by Chance Ojeda MD at [address] [facility]  SMALL INTESTINE SURGERY      SPLENECTOMY      KRISTEL AND BSO      TONSILLECTOMY AND ADENOIDECTOMY      TOTAL KNEE ARTHROPLASTY      UPPER GASTROINTESTINAL ENDOSCOPY  12/2011    gerd s/p gastric bypass    UPPER GASTROINTESTINAL ENDOSCOPY  02/2014    normal s/p gastric bypass    UPPER GASTROINTESTINAL ENDOSCOPY  02/2010    biopsy neg Barretts, chronic reflux esophagitis s/p gastric bypass    UPPER GASTROINTESTINAL ENDOSCOPY  07/2006    unremarkable s/p gastric bypass    UPPER GASTROINTESTINAL ENDOSCOPY  08/10/2015    Dr Jane Singh UPPER GASTROINTESTINAL ENDOSCOPY N/A 04/01/2016    Dr. Chapis Da Silva:  H Pylori(-), HH, o/w normal    UPPER GASTROINTESTINAL ENDOSCOPY N/A 05/07/2021    Dr Vincent Lewis hiatal hernia, previous gastric bypass surgery, gastritis    VENA CAVA FILTER PLACEMENT Right 2003       Family History   Problem Relation Age of Onset    Uterine Cancer Mother     Cervical Cancer Mother     Coronary Art Dis Mother     Heart Disease Father     Lung Cancer Father     Other Father         renal failure    Colon Cancer Brother     Colon Polyps Brother     Uterine Cancer Maternal Grandmother     Cervical Cancer Maternal Grandmother     Diabetes Maternal Grandmother     Cervical Cancer Sister     Other Sister         fibromyalgia    Diabetes Paternal Aunt     Breast Cancer Maternal Cousin     Esophageal Cancer Neg Hx     Liver Cancer Neg Hx     Liver Disease Neg Hx     Stomach Cancer Neg Hx     Rectal Cancer Neg Hx        CareTeam (Including outside providers/suppliers regularly involved in providing care):   Patient Care Team:  Deidre Spivey MD as PCP - General (Family Medicine)  Deidre Spivey MD as PCP - [facility]  Anny Hopson (General Surgery: [facility])  CHANELLE Sanchez as Nurse Practitioner (Family Nurse Practitioner)  CHANELLE Hebert as Nurse Practitioner (Clinical Nurse Specialist Adult Health)  Sandro Quiroga MD as Consulting Physician (Nephrology)  CHANELLE Momin CNP as Nurse Practitioner ([address] [facility])    Wt Readings from Last 3 Encounters:   04/19/22 242 lb (109.8 kg)   03/29/22 245 lb (111.1 kg)   01/18/22 244 lb 12.8 oz (111 kg)     Vitals:    04/19/22 0819   BP: 135/74   Site: Left Upper Arm   Position: Sitting   Pulse: 69   Resp: 20   SpO2: 99%   Weight: 242 lb (109.8 kg)   Height: 5' 7\" (1.702 m)           The following problems were reviewed today and where indicated follow up appointments were made and/or referrals ordered. Risk Factor Screenings with Interventions     Fall Risk:  2 or more falls in past year?: no  Fall with injury in past year?: no  Fall Risk Interventions:    · Home safety tips provided    Depression:  PHQ-2 Score: 6  Depression Interventions:  · Relaxation techniques discussed    Anxiety:     Anxiety Interventions:  · Relaxation techniques discussed    Cognitive:  Clock Drawing Test (CDT): Normal  Cognitive Impairment Interventions:  · Patient declines any further evaluation/treatment for cognitive impairment    Substance Abuse:  Social History     Socioeconomic History    Marital status:      Spouse name: Not on file    Number of children: 1    Years of education: Not on file    Highest education level: Not on file   Occupational History     Employer: [employer]    Tobacco Use    Smoking status: Never Smoker    Smokeless tobacco: Never Used   Vaping Use    Vaping Use: Never used   Substance and Sexual Activity    Alcohol use: No    Drug use: No    Sexual activity: Not Currently   Other Topics Concern    Not on file   Social History Narrative    Lives with daughter, son-in-law, granddaughter, niece. Drives very little, daughter usually transports pt. Works part time at [employer]. Has pet dog.      Social Determinants of Health     Financial Resource Strain: Medium Risk    Difficulty of Paying Living Expenses: Somewhat hard   Food Insecurity: Food Insecurity Present    Worried About Running Out of Food in the Last Year: Sometimes true    Ran Out of Food in the Last Year: Never true   Transportation Needs: No Transportation Needs    Lack of Transportation (Medical): No    Lack of Transportation (Non-Medical):  No   Physical Activity: Inactive    Days of Exercise per Week: 0 days    Minutes of Exercise per Session: 0 min   Stress:     Feeling of Stress : Not on file   Social Connections:     Frequency of Communication with Friends and Family: Not on file    Frequency of Social Gatherings with Friends and Family: Not on file    Attends Anglican Services: Not on file    Active Member of Clubs or Organizations: Not on file    Attends Club or Organization Meetings: Not on file    Marital Status: Not on file   Intimate Partner Violence:     Fear of Current or Ex-Partner: Not on file    Emotionally Abused: Not on file    Physically Abused: Not on file    Sexually Abused: Not on file   Housing Stability:     Unable to Pay for Housing in the Last Year: Not on file    Number of [place] in the Last Year: Not on file    Unstable Housing in the Last Year: Not on file        Substance Abuse Interventions:  · no concerns    Health Risk Assessment:     General  In general, how would you say your health is?: Fair  In the past 7 days, have you experienced any of the following: New or Increased Pain, New or Increased Fatigue, Loneliness, Social Isolation, Stress or Anger?: (!) Yes  Select all that apply: (!) New or Increased Pain,New or Increased Fatigue,Loneliness,Stress,Anger  Do you get the social and emotional support that you need?: Yes  General Health Risk Interventions:  · no concerns    Health Habits/Nutrition  On average, how many days per week do you engage in moderate to strenuous exercise (like a brisk walk)?: 0 days  On average, how many minutes do you engage in exercise at this level?: 0 min  Have you lost any weight without trying in the past 3 months?: No  Have you seen the dentist within the past year?: N/A - wear dentures  Body mass index is 37.9 kg/m².   Health Habits/Nutrition Interventions:  · no concerns    Hearing/Vision  Do you or your family notice any trouble with your hearing that hasn't been managed with hearing aids?: No  Do you have difficulty driving, watching TV, or doing any of your daily activities because of your eyesight?: (!) Yes  Have you had an eye exam within the past year?: Yes  Hearing/Vision Interventions:  · Vision concerns:  patient encouraged to make appointment with his/her eye specialist    Safety  Do you have working smoke detectors?: Yes  Do you have any tripping hazards - loose or unsecured carpets or rugs?: No  Do you have any tripping hazards - clutter in doorways, halls, or stairs?: (!) Yes  Do you have either shower bars, grab bars, non-slip mats or non-slip surfaces in your shower or bathtub?: (!) No  Do all of your stairways have a railing or banister?: Yes  Do you always fasten your seatbelt when you are in a car?: (!) No  Safety Interventions:  · Patient declines any further evaluation/treatment for this issue    ADLs  In the past 7 days, did you need help from others to perform any of the following everyday activities: Eating, dressing, grooming, bathing, toileting, or walking/balance?: (!) Yes  Select all that apply: (!) Walking/Balance  In the past 7 days, did you need help from others to take care of any of the following: Laundry, housekeeping, banking/finances, shopping, telephone use, food preparation, transportation, or taking medications?: No  ADL Interventions:  · Patient declines any further evaluation/treatment for this issue    Personalized Preventive Plan   Current Health Maintenance Status  Immunization History   Administered Date(s) Administered    COVID-19, Moderna, Booster, PF, 50mcg/0.25ml 10/11/2021    COVID-19, Ralph Dayannick, Primary or Immunocompromised, PF, 100mcg/0.5mL 01/08/2021, 02/05/2021, 03/05/2021, 10/11/2021  Influenza Whole 10/12/2015    Influenza, High Dose (Fluzone 65 yrs and older) 11/08/2016, 09/15/2017, 01/09/2018, 10/12/2018    Influenza, Quadv, adjuvanted, 65 yrs +, IM, PF (Fluad) 10/22/2020, 10/11/2021    Influenza, Triv, inactivated, subunit, adjuvanted, IM (Fluad 65 yrs and older) 09/13/2019    Pneumococcal Conjugate 13-valent ([id number]) 01/31/2018    Pneumococcal Polysaccharide ([id number]) 02/22/2019    Tdap (Boostrix, Adacel) 03/07/2017, 08/17/2018    Zoster Live (Zostavax) 10/12/2015        Health Maintenance   Topic Date Due    Meningococcal (ACWY) vaccine (1 - Risk start 2-23 months series) Never done    Hib vaccine (1 of 1 - Risk 1-dose series) Never done    Meningococcal B vaccine (1 of 4 - Increased Risk Bexsero 2-dose series) Never done    Shingles Vaccine (2 of 3) 12/07/2015    Annual Wellness Visit (AWV)  03/19/2022    Breast cancer screen  06/01/2022    Diabetic foot exam  10/11/2022    Diabetic retinal exam  02/10/2023    Depression Monitoring  04/05/2023    A1C test (Diabetic or Prediabetic)  04/11/2023    Lipids  04/11/2023    TSH  04/11/2023    Potassium  04/11/2023    Creatinine  04/11/2023    Colorectal Cancer Screen  05/07/2026    DTaP/Tdap/Td vaccine (3 - Td or Tdap) 08/17/2028    DEXA (modify frequency per FRAX score)  Completed    Flu vaccine  Completed    Pneumococcal 65+ years Vaccine  Completed    COVID-19 Vaccine  Completed    Hepatitis C screen  Completed    Hepatitis A vaccine  Aged Out       Recommendations for Preventive Services Due: see orders.   Recommended screening schedule for the next 5-10 years is provided to the patient in written form: see Patient Instructions/AVS.

## 2022-04-19 NOTE — PROGRESS NOTES
After obtaining consent, and per orders of Dr. Tiffanie Alvarado, injection of B12 given in Left deltoid by Jose David Delacruz MA. Patient instructed to remain in clinic for 20 minutes afterwards, and to report any adverse reaction to me immediately.

## 2022-04-21 ENCOUNTER — ANTI-COAG VISIT (OUTPATIENT)
Dept: PRIMARY CARE CLINIC | Age: 72
End: 2022-04-21
Payer: MEDICARE

## 2022-04-21 DIAGNOSIS — Z79.01 ANTICOAGULATED ON COUMADIN: Primary | ICD-10-CM

## 2022-04-21 LAB — INR BLD: 2.9

## 2022-04-21 PROCEDURE — 93793 ANTICOAG MGMT PT WARFARIN: CPT | Performed by: INTERNAL MEDICINE

## 2022-04-21 NOTE — PROGRESS NOTES
HOME MONITORING REPORT    INR today:   Results for orders placed or performed in visit on 04/21/22   Protime-INR   Result Value Ref Range    INR 2.90        INR Goal: 2.0-3.0    Dosing Plan  As of 4/21/2022    TTR:  27.2 % (3.9 y)   Full warfarin instructions:  7.5 mg every Mon, Wed, Fri; 15 mg all other days              PLAN: Patient aware to continue current dose and recheck in one week or as ordered. Electronically signed by Helio Concepcion MD on 4/21/2022 at 4:03 PM    I have reviewed nursing plan for Coumadin management and agree with plan.

## 2022-04-22 RX ORDER — ISOPROPYL ALCOHOL 0.75 G/1
SWAB TOPICAL
Qty: 300 EACH | Refills: 3 | Status: SHIPPED | OUTPATIENT
Start: 2022-04-22

## 2022-04-22 RX ORDER — BLOOD-GLUCOSE CONTROL, LOW
EACH MISCELLANEOUS
Qty: 3 EACH | Refills: 3 | Status: SHIPPED | OUTPATIENT
Start: 2022-04-22

## 2022-04-24 PROBLEM — N18.30 CHRONIC RENAL DISEASE, STAGE III (HCC): Status: ACTIVE | Noted: 2022-04-24

## 2022-04-24 ASSESSMENT — ENCOUNTER SYMPTOMS
DIARRHEA: 0
EYE DISCHARGE: 0
COUGH: 0
APNEA: 0
SHORTNESS OF BREATH: 0
WHEEZING: 0
ABDOMINAL PAIN: 1
VOMITING: 0
NAUSEA: 0
TROUBLE SWALLOWING: 0
SORE THROAT: 0
SINUS PRESSURE: 0
EYE ITCHING: 0
ABDOMINAL DISTENTION: 0
CONSTIPATION: 0
BACK PAIN: 1
BLOOD IN STOOL: 0
VOICE CHANGE: 0
RHINORRHEA: 0
CHEST TIGHTNESS: 0
COLOR CHANGE: 0
STRIDOR: 0
SINUS PAIN: 0

## 2022-04-25 ENCOUNTER — HOSPITAL ENCOUNTER (OUTPATIENT)
Dept: CT IMAGING | Age: 72
Discharge: HOME | End: 2022-04-25
Payer: MEDICARE

## 2022-04-25 DIAGNOSIS — R52 PAIN: ICD-10-CM

## 2022-04-25 PROCEDURE — 6360000004 HC RX CONTRAST MEDICATION: Performed by: INTERNAL MEDICINE

## 2022-04-25 PROCEDURE — 74178 CT ABD&PLV WO CNTR FLWD CNTR: CPT

## 2022-04-25 RX ADMIN — IOPAMIDOL 90 ML: 755 INJECTION, SOLUTION INTRAVENOUS at 12:05

## 2022-04-28 ENCOUNTER — TELEPHONE (OUTPATIENT)
Dept: INTERNAL MEDICINE | Age: 72
End: 2022-04-28

## 2022-04-28 ENCOUNTER — ANTI-COAG VISIT (OUTPATIENT)
Dept: PRIMARY CARE CLINIC | Age: 72
End: 2022-04-28
Payer: MEDICARE

## 2022-04-28 DIAGNOSIS — Z79.01 ANTICOAGULATED ON COUMADIN: Primary | ICD-10-CM

## 2022-04-28 LAB — INR BLD: 3.3

## 2022-04-28 PROCEDURE — 93793 ANTICOAG MGMT PT WARFARIN: CPT | Performed by: INTERNAL MEDICINE

## 2022-04-28 NOTE — PROGRESS NOTES
HOME MONITORING REPORT    INR today:   Results for orders placed or performed in visit on 04/28/22   Protime-INR   Result Value Ref Range    INR 3.30        INR Goal: 2.0-3.0    Dosing Plan  As of 4/28/2022    TTR:  27.2 % (3.9 y)   Full warfarin instructions:  7.5 mg every Mon, Wed, Fri; 15 mg all other days              PLAN: Advised patient/caregiver to continue current dose and recheck in one week. She may have a green salad or a serving of a green vegetable in the next day or two. Patient/Caregiver voiced understanding    Electronically signed by Pj Goodrich MD on 4/28/2022 at 3:20 PM    I have reviewed nursing plan for Coumadin management and agree with plan.

## 2022-04-29 NOTE — TELEPHONE ENCOUNTER
Right now, we will leave her at this dose. Yes it is a starting dose.   We only started this on April 19

## 2022-04-29 NOTE — TELEPHONE ENCOUNTER
I called back and spoke with Margareth who said the rx was cancelled yesterday. Informed her I just spoke with Baylee a few minutes ago and he didn't say anything about the rx being cancelled. Margareth transferred me to Polo Mckeon. He asked who cancelled the rx and he said if it sits there for a certain length time without being fill it cancels out. He did take a VO for the directions of 0.25mg weekly.

## 2022-04-29 NOTE — TELEPHONE ENCOUNTER
Spoke with [facility]. Rx was written for 0.25mg weekly which is a weekly starting dose. Do you want to continue the pt at this dose or did you mean to increase her. They won't fill until they hear back. Please advise.

## 2022-05-02 NOTE — PROGRESS NOTES
Progress Note      Pt Name: Temi Ruiz: 1950  MRN: [id number]    Date of evaluation: 05/03/2022  History Obtained From:  patient, electronic medical record    CHIEF COMPLAINT:    Chief Complaint   Patient presents with    Follow-up     Iron deficiency anemia, unspecified iron deficiency anemia type    Discuss Labs     Mild thrombocytopenia     HISTORY OF PRESENT ILLNESS:    Marisabel Das is a 66 y.o.  female with significant PMH recurrent DVT, thrombocytopenia and iron deficiency anemia. \[facility]\"" continues on long-term anticoagulation with Coumadin and has previously received B12 injections under the management of Dr. Jacek Cortez. Recommendation has been to take ferrous gluconate 240 mg p.o. twice daily. \[facility]\"" returns today in scheduled follow-up for evaluation, lab monitoring, side effect monitoring and further treatment recommendations. \[facility]\"" presents today indicating that she has been compliant with her oral iron taking twice daily. She reports tolerating without significant difficulty. \[facility]\"" reports that she is scheduled to have right knee replacement in [place] on 7/19/2022. Other than knee pain she has no significant complaints today. Hemoglobin is stable at 11.1 with hematocrit of 35.8. Lab results reviewed with patient today and are documented below. 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 Jacek Cortez?    Venofer for a total of 1000 mg completed on 7/29/2018   Ferrous sulfate 325 mg PO twice a day , 8/15/2018-May 2020  Ferrous gluconate 240 mg p.o. 3 times daily initiated in May 2020, currently taking twice daily    Hematology history #1- Recurrent DVT and history of pulmonary emboli  \[facility]\"" was seen in initial consultation on 7/26/2018 during her hospitalization at [facility] for recurrent DVT and anemia. She has a history of pulmonary emboli and DVTand has been followed in the past by Dr. Aura Hill. Serology studies in September 2007 documented ANÍBAL negative and factor II analysis prothrombin gene mutation was negative. She has an IVC filter in place. Michelle Gasca presented to the ER at [facility] on 7/24/2018 with complaints of significant left lower extremity edema and pain that had been persistent for approximately one week prior to presentation. Michelle Gasca has been on chronic anticoagulation with warfarin since 2003 and was subtherapeutic at presentation with an INR of 1.27 on 7/24/2018. Review of INRs available through Epic dating back to 1/31/2018 to 7/24/2018 indicated Michelle Gasca has remained routinely subtherapeutic. She reported financial restraint and was unable to obtain warfarin. During her hospitalization, she was placed on a heparin drip and bridged with warfarin. LOWER EXTREMITY BILATERAL VENOUS DUPLEX On 7/25/2018 - chronic deep vein thrombosis in the right lower extremity involving the femoral and popliteal, veins. Lupus anticoagulant not detected on 7/26/2018. Recurrent DVT to Left lower extremity occurred most likely due to subtherapeutic INR related to noncompliance, do not suspect warfarin failure. Michelle Gasca indicates that she monitors her PT/INR at home and calls Dr. Romaine Hidalgo office with the results for dosing changes related to her warfarin. Hematology history #2- Iron deficiency anemia  Michelle Gasca has a history of iron deficiency anemia dating back to  1993. Her deficiency may also be exacerbated by absorption, she had aRouxen-Y gastric bypass surgery 9/12/2003. She has received IV iron replacement with Venofer on several occasions under the direction of Dr. Kehinde Hagen. Michelle Gasca had a bone marrow aspiration and biopsy was completed on 8/31/2007 documented left shifted myeloid maturation and dysmaturation. 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 Normocellular bone marrow for age.  Decreased storage iron and no ringed sideroblasts. Moderate increase of reticulin fibers. Valorie Juan had an upper endoscopy on 8/10/2015 by Dr. Christine [facility] that identified a normal postoperative Gage en Y gastric bypass anatomy. A marginal ulcer with no bleeding tendencies noted. Serology studies on 7/26/2018:  Iron 41   Iron saturation 13%   Ferritin 97.4   TIBC 316   Reticulocyte count 0.0521      Haptoglobin <10   Hemoglobin 8.7 and hematocrit 27.8    Leny received Venofer for a total of 1000 mg, completed on 7/29/2018. Serology studies on 5/6/2019, Obtained by Dr. Shanice Rojas:  Iron 54   B12 876   Folate 7.61   Copper 135     Serology studies on 11/22/2019:  · Iron 74  · Iron saturation 20.1%  · TIBC is 368  · Ferritin 88  · Folate 16  · Hemoglobin 11.4/MCV 92.0    Serology studies on 10/28/2020  · Ferritin 259  · Iron 45  · TIBC 248  · Vitamin B12 796    Serology studies on 2/25/2021  · Iron 53  · TIBC 314  · Iron saturation 17  · Ferritin 81.7  · Vitamin B-12:506   · Folate 9.7    Serology studies on 3/11/2021  · Vitamin B-12:  806  · Vitamin D: 25.4    4/13/2021 CT Abdomen/Pelvis without documented reported as stable with no change from previous scan. No acute abnormality of the abdomen or pelvis. Evidence of previous gastric bypass surgery. Moderate thickening of the stomach in the area of the surgery is probably due to incomplete distention. However possibility of an inflammatory or neoplastic process may not be excluded. Serology studies on 4/29/2021  · Sed rate 53  · CRP 5.92    5/7/2021  EGD/Colon at [facility]: Path revealed A. Stomach, random gastric biopsies: 1. Benign gastric mucosa with minimal chronic inflammation. 2. No Helicobacter pylori organisms identified by immunohistochemical stain. B. Colon, random colonic biopsies: Benign colonic mucosa with no significant histopathologic changes.      5/7/2021 Barium enema due to incomplete colonoscopy: tortuous colon: No obvious mucosal lesion, mass effect or colonic stricture is identified, there is somewhat limited visualization of the splenic flexure due to overlapping bowel loops. Scattered sigmoid diverticula are present.      2021 Bilateral mammograms was reported as no mammographic evidence of malignancy    Serology studies on 2021  · Iron 40  · TIBC 297  · Saturation 13%  · Ferritin 105.3  · Folate 10.2    2022 Serology results  · Iron 102  · TIBC 446  · Saturation 23%  · Ferritin 83.5    2022 Serology results  · B12:  1026  · Vit D 14.9    2022 Serology results  · B12:  885  · Vit D 28.4    Age Appropriate Health Screenin2020 Bone Density- osteopenia and increased fracture risk; lumbar T-score -1.5, hips T-score -1.9    Past Medical History:    Past Medical History:   Diagnosis Date    Abdominal pain     Abnormal EKG     Acute sinusitis     Acute superficial venous thrombosis of left lower extremity     Allergic reaction to spider bite     Anemia     Anticoagulated     Anxiety     Arrhythmia     Asthmatic bronchitis without complication     Ataxic gait     Lyons's esophagus     Bowel obstruction (HCC)     Burn of abdomen wall, second degree, initial encounter 2018    Callus     Cardiac pacemaker     Cerebral artery occlusion with cerebral infarction (HCC)     CKD (chronic kidney disease) stage 2, GFR 60-89 ml/min     Coat's syndrome     Coat's syndrome     both eyes    COPD ([facility]ronic obstructive pulmonary disease) [facility] [address]     Depression     Diabetes mellitus type 2 in nonobese (HCC)     Diabetic nephropathy (HCC)     Disequilibrium     Dizziness     DVT (deep venous thrombosis) (HCC)     Exudative retinopathy     Fibromyalgia     Fibromyositis     Gastric ulcer     GERD (gastroesophageal reflux disease)     History of gastric bypass     Hx of blood clots     Hx of lupus anticoagulant disorder     Hyperlipidemia 2019    Hypertension     Hypothyroidism     Intermittent claudication ([address]     Intestinal obstruction (HCC)     Iron deficiency     Left-sided weakness     Low vitamin D level     Lupus (HCC)     Menopause     Obesity     Osteoarthritis     Osteoporosis     Other iron deficiency anemias     Palliative care patient 09/24/2020    Pernicious anemia     PONV (postoperative nausea and vomiting)     PUPP (pruritic urticarial papules and plaques of pregnancy)     Restless legs syndrome (RLS) 7/11/2019    Right leg numbness     Right sided sciatica     Sarcoidosis     with liver involvement    Sciatica     Secondary hyperparathyroidism ([address]     Shingles     Shortness of breath     Sleep apnea     Stomach ulcer     Syncope     Visual loss, one eye        Past Surgical History:    Past Surgical History:   Procedure Laterality Date    APPENDECTOMY      CARDIAC CATHETERIZATION  [facility]21/13  [facility]    EF over 60%    CHOLECYSTECTOMY      COLONOSCOPY  02/2010    negative    COLONOSCOPY  02/22/2010    Dr Bennett Fail    COLONOSCOPY  04/01/2016    Dr LAKHWINDER Horvath-internal hemorrhoids, 5 yr recall    COLONOSCOPY N/A 05/07/2021    Dr Nicanor Farrell looping/tortuosity throughout the left colon, BE=Normal    DILATATION, ESOPHAGUS      EYE SURGERY      Cyst on Right eye    EYE SURGERY      GASTRIC BYPASS SURGERY      GASTRIC BYPASS SURGERY      HERNIA REPAIR      HYSTERECTOMY      Complete    HYSTERECTOMY      Partial - because had a tubal pregnancy.      INCONTINENCE SURGERY      Bladder Sling    INFECTED SKIN DEBRIDEMENT Right     dog bite right forearm    OTHER SURGICAL HISTORY      IVC filter    PACEMAKER INSERTION      PACEMAKER PLACEMENT      medtronic    FL TOTAL KNEE ARTHROPLASTY Right 03/26/2018    TOTAL KNEE REPLACEMENT COMPLEX PRIMARY performed by Breanna Sutton MD at [address] [facility] AND [facility]      TONSILLECTOMY AND ADENOIDECTOMY      TOTAL KNEE ARTHROPLASTY      UPPER GASTROINTESTINAL ENDOSCOPY  12/2011    gerd s/p gastric bypass    UPPER GASTROINTESTINAL ENDOSCOPY  02/2014    normal s/p gastric bypass    UPPER GASTROINTESTINAL ENDOSCOPY  02/2010    biopsy neg Barretts, chronic reflux esophagitis s/p gastric bypass    UPPER GASTROINTESTINAL ENDOSCOPY  07/2006    unremarkable s/p gastric bypass    UPPER GASTROINTESTINAL ENDOSCOPY  08/10/2015    Dr Rashawn Landin UPPER GASTROINTESTINAL ENDOSCOPY N/A 04/01/2016    Dr. Salma Ayonight:  H Pylori(-), HH, o/w normal    UPPER GASTROINTESTINAL ENDOSCOPY N/A 05/07/2021    Dr Lawson [facility] hiatal hernia, previous gastric bypass surgery, gastritis    VENA CAVA FILTER PLACEMENT Right 2003       Current Medications:    Current Outpatient Medications   Medication Sig Dispense Refill    Alcohol Swabs (B-D SINGLE USE SWABS REGULAR) PADS TID E11.21 300 each 3    Blood Glucose Calibration (TRUE METRIX LEVEL 1) Low SOLN TID E11.21 3 each 3    montelukast (SINGULAIR) 10 MG tablet Take 1 tablet by mouth daily 90 tablet 3    Hydrocortisone, Perianal, (PROCTO-CASPER) 1 % cream Apply topically 2 times daily.  1 each 1    gabapentin (NEURONTIN) 300 MG capsule TAKE 1 CAPSULE BY MOUTH THREE TIMES DAILY 270 capsule 0    Semaglutide,0.25 or 0.5MG/DOS, 2 MG/1.5ML SOPN Inject 0.25 mg into the skin once a week 3 pen 1    sucralfate (CARAFATE) 1 GM tablet TAKE 1 TABLET FOUR TIMES DAILY 360 tablet 0    calcitRIOL (ROCALTROL) 0.25 MCG capsule TAKE 1 CAPSULE EVERY DAY 90 capsule 0    ondansetron (ZOFRAN) 4 MG tablet Take 1 tablet by mouth every 8 hours as needed for Nausea 30 tablet 1    baclofen (LIORESAL) 10 MG tablet Take 1 tablet by mouth 3 times daily As needed for hip pain (Patient taking differently: Take 10 mg by mouth 3 times daily as needed As needed for hip pain) 30 tablet 1    Cyanocobalamin 1000 MCG/ML KIT Inject 1 mL as directed every 30 days 3 kit 1    calcipotriene (DOVONEX) 0.005 % cream Use topically as needed (Patient taking differently: Apply 1 Dose topically 2 times daily as needed Use topically as needed) 3 each 1    escitalopram (LEXAPRO) 20 MG tablet Take 1 tablet by mouth daily 90 tablet 3    ferrous gluconate (FERGON) 240 (27 Fe) MG tablet Take 1 tablet by mouth 2 times daily 180 tablet 2    tiotropium (SPIRIVA RESPIMAT) 2.5 MCG/ACT AERS inhaler Inhale 2 puffs into the lungs daily 3 each 1    bumetanide (BUMEX) 2 MG tablet Take 1 tablet by mouth daily 30 tablet 5    levothyroxine (SYNTHROID) 100 MCG tablet Take 1 tablet by mouth Daily 90 tablet 3    potassium chloride (KLOR-CON) 10 MEQ extended release tablet Take 1 tablet by mouth daily 60 tablet 5    warfarin (COUMADIN) 7.5 MG tablet 7.5mg on Monday/Wed/Fri and 15mg all other days (Patient taking differently: Take 7.5 mg by mouth daily 7.5mg on Monday/Wed/Fri and 15mg all other days) 180 tablet 3    pantoprazole (PROTONIX) 40 MG tablet TAKE 1 TABLET BY MOUTH TWICE DAILY BEFORE MEAL(S) (Patient taking differently: Take 40 mg by mouth in the morning and at bedtime TAKE 1 TABLET BY MOUTH TWICE DAILY BEFORE MEAL(S)) 180 tablet 1    rosuvastatin (CRESTOR) 5 MG tablet Take 1 tablet by mouth daily 30 tablet 5    clobetasol (TEMOVATE) 0.05 % cream Apply topically 2 times daily. 3 each 2    lisinopril (PRINIVIL;ZESTRIL) 40 MG tablet Take 1 tablet by mouth once daily (Patient taking differently: Take 40 mg by mouth daily Take 1 tablet by mouth once daily) 90 tablet 1    CPAP Machine MISC Inhale 1 each into the lungs nightly      fexofenadine (ALLEGRA) 180 MG tablet Take 1 tablet by mouth daily Indications:  Allergic Rhinitis 90 tablet 3    Multiple Vitamins-Minerals (CENTRUM ADULTS) TABS Take 1 tablet by mouth daily      aspirin 81 MG tablet Take 81 mg by mouth daily      cyanocobalamin 1000 MCG/ML injection        Current Facility-Administered Medications   Medication Dose Route Frequency Provider Last Rate Last Admin    cyanocobalamin injection 1,000 mcg  1,000 mcg IntraMUSCular Once Jayesh Hanson MD            Allergies: Allergies   Allergen Reactions    Insect Extract Allergy Skin Test Anaphylaxis     Bee stings    Tramadol      Other reaction(s): Vomiting    Lactose Intolerance (Gi)     Prednisone      Headache and upset stomach    Zanaflex [Tizanidine Hcl]      Passed out lost control of body functions    Lortab [Hydrocodone-Acetaminophen] Nausea And Vomiting     Sweats, weak, nausea and vomiting    Other Nausea And Vomiting     Opiates------sweating, weak        Oxycodone-Acetaminophen Nausea And Vomiting     Sweating and vomiting     Ultram [Tramadol Hcl] Nausea And Vomiting     Sweating, weak, nausea and vomiting         Social History:    Social History     Tobacco Use    Smoking status: Never Smoker    Smokeless tobacco: Never Used   Vaping Use    Vaping Use: Never used   Substance Use Topics    Alcohol use: No    Drug use: No       Family History:   Family History   Problem Relation Age of Onset    Uterine Cancer Mother     Cervical Cancer Mother     Coronary Art Dis Mother     Heart Disease Father     Lung Cancer Father     Other Father         renal failure    Colon Cancer Brother     Colon Polyps Brother     Uterine Cancer Maternal Grandmother     Cervical Cancer Maternal Grandmother     Diabetes Maternal Grandmother     Cervical Cancer Sister     Other Sister         fibromyalgia    Diabetes Paternal Aunt     Breast Cancer Maternal Cousin     Esophageal Cancer Neg Hx     Liver Cancer Neg Hx     Liver Disease Neg Hx     Stomach Cancer Neg Hx     Rectal Cancer Neg Hx        Vitals:  Vitals:    05/03/22 0916   BP: 134/84   Pulse: 83   SpO2: 98%   Weight: 242 lb 11.2 oz (110.1 kg)   Height: 5' 7\" (1.702 m)        Subjective   REVIEW OF SYSTEMS:   Review of Systems   Constitutional: Positive for fatigue. Negative for chills, diaphoresis and fever. HENT: Negative.   Negative for congestion, ear pain, hearing loss, nosebleeds, sore throat and tinnitus. Eyes: Negative. Negative for pain, discharge and redness. Respiratory: Negative. Negative for cough, shortness of breath and wheezing. Cardiovascular: Negative. Negative for chest pain, palpitations and leg swelling. Gastrointestinal: Negative. Negative for abdominal pain, blood in stool, constipation, diarrhea, nausea and vomiting. Endocrine: Negative for polydipsia. Genitourinary: Negative for dysuria, flank pain, frequency, hematuria and urgency. Musculoskeletal: Positive for gait problem (having right knee replacement). Negative for back pain, myalgias and neck pain. Skin: Negative. Negative for rash. Neurological: Negative for dizziness, tremors, seizures, weakness and headaches. Hematological: Does not bruise/bleed easily. Psychiatric/Behavioral: Negative. The patient is not nervous/anxious. Objective   PHYSICAL EXAM:  Physical Exam  Vitals reviewed. Constitutional:       General: She is not in acute distress. Appearance: She is well-developed. HENT:      Head: Normocephalic and atraumatic. Mouth/Throat:      Pharynx: Uvula midline. Tonsils: No tonsillar exudate. Eyes:      General: Lids are normal.      Conjunctiva/sclera: Conjunctivae normal.      Pupils: Pupils are equal, round, and reactive to light. Neck:      Thyroid: No thyroid mass or thyromegaly. Vascular: No JVD. Trachea: Trachea normal. No tracheal deviation. Cardiovascular:      Rate and Rhythm: Normal rate and regular rhythm. Pulses: Normal pulses. Heart sounds: Normal heart sounds. Pulmonary:      Effort: Pulmonary effort is normal. No respiratory distress. Breath sounds: Normal breath sounds. No wheezing or rales. Chest:      Chest wall: No tenderness. Abdominal:      General: Bowel sounds are normal. There is no distension. Palpations: Abdomen is soft. There is no mass. Tenderness: There is no abdominal tenderness.  There is no guarding. Musculoskeletal:         General: No tenderness or deformity. Cervical back: Normal range of motion and neck supple. Right lower leg: Edema (Chronic) present. Left lower leg: Edema (Chronic) present. Comments: Range of motion within normal limits x4 extremities   Skin:     General: Skin is warm. Findings: No bruising, erythema or rash. Neurological:      Mental Status: She is alert and oriented to person, place, and time. Cranial Nerves: No cranial nerve deficit. Coordination: Coordination normal.   Psychiatric:         Behavior: Behavior normal.         Thought Content: Thought content normal.       Labs:  Lab Results   Component Value Date    WBC 5.20 05/03/2022    HGB 11.1 (L) 05/03/2022    HCT 35.8 05/03/2022    MCV 92.7 05/03/2022     (L) 05/03/2022     Lab Results   Component Value Date    NEUTROABS 2.80 05/03/2022       ASSESSMENT/PLAN:      1. Iron deficiency anemia with concern for malabsorption, hemoglobin stable at 11.1 with a hematocrit of 35.8. Reports compliant with ferrous gluconate 240 mg twice daily and tolerating without significant difficulty. 01/04/2022 Serology results  · Iron 102  · TIBC 446  · Saturation 23%  · Ferritin 83.5    01/11/2022 Serology results  · B12:  1026  · Vit D 14.9    04/11/2022 Serology results  · B12:  885  · Vit D 28.4    Labs to be obtained today:  - Iron and TIBC; Future  - Ferritin; Future  - Vitamin B12; Future  - Comprehensive Metabolic Panel; Future    -Continue iron supplement, ferrous gluconate 240 mg p.o. twice daily    2. Anticoagulated on Coumadin, due to history of recurrent DVTs and pulmonary emboli. Coumadin is currently managed by Dr. Joe Powell. INR 3.30 on 4/28/2022  -Continue chronic anticoagulation management under the direction of Dr. Joe Powell with a goal INR between 2-3     3. Chronic thrombocytopenia that waxes and wanes. Platelet count stable at 142,000.   Denies any excessive bruising or bleeding to include melena, epistaxis, hemoptysis, hematuria or hematochezia. -Monitor closely for bleeding    4. History of hypothyroidism, on Synthroid 1000 mcg daily. Managed by Dr. Gonzales Ramirez. ,  TSH of 3.480 last on 4/11/2021. I discussed all of the above findings included in the assessment and plan with the patient and the patient is in agreement to move forward with current recommendations/treatment. I have addressed all of their questions and concerns that were verbalized. FOLLOW UP:  1. Follow-up appointment given for 4 months, sooner if needed  2. Continue to follow closely with Dr. Gonzales Ramirez and other medical providers as recommended  3. Labs at next visit: Iron substrates, ferritin, CMP and CBC    EMR Dragon/Transcription disclaimer:   Much of this encounter note is an electronic transcription/translation of spoken language to printed text. The electronic translation of spoken language may permit erroneous, or at times, nonsensical words or phrases to be inadvertently transcribed; although attempts have made to review the note for such errors, some may still exist.  Please excuse any unrecognized transcription errors and contact us if the air is unintelligible or needs documented correction. Also, portions of this note have been copied forward, however, changed to reflect the most current clinical status of this patient. I, Elver Gleason, am pre-charting as a registered nurse for CHANELLE Marie.      Electronically signed by CHANELLE Mariee on 5/4/2022 at 1:09 PM.

## 2022-05-03 ENCOUNTER — HOSPITAL ENCOUNTER (OUTPATIENT)
Dept: INFUSION THERAPY | Age: 72
Discharge: HOME | End: 2022-05-03
Payer: MEDICARE

## 2022-05-03 ENCOUNTER — OFFICE VISIT (OUTPATIENT)
Dept: HEMATOLOGY | Age: 72
End: 2022-05-03
Payer: MEDICARE

## 2022-05-03 VITALS
DIASTOLIC BLOOD PRESSURE: 84 MMHG | OXYGEN SATURATION: 98 % | SYSTOLIC BLOOD PRESSURE: 134 MMHG | HEIGHT: 67 IN | HEART RATE: 83 BPM | WEIGHT: 242.7 LBS | BODY MASS INDEX: 38.09 KG/M2

## 2022-05-03 DIAGNOSIS — D69.6 THROMBOCYTOPENIA (HCC): ICD-10-CM

## 2022-05-03 DIAGNOSIS — Z98.84 HISTORY OF ROUX-EN-Y GASTRIC BYPASS: ICD-10-CM

## 2022-05-03 DIAGNOSIS — D50.9 IRON DEFICIENCY ANEMIA, UNSPECIFIED IRON DEFICIENCY ANEMIA TYPE: ICD-10-CM

## 2022-05-03 DIAGNOSIS — D50.9 IRON DEFICIENCY ANEMIA, UNSPECIFIED IRON DEFICIENCY ANEMIA TYPE: Primary | ICD-10-CM

## 2022-05-03 DIAGNOSIS — E03.9 HYPOTHYROIDISM, UNSPECIFIED TYPE: ICD-10-CM

## 2022-05-03 DIAGNOSIS — Z79.01 ANTICOAGULATED ON COUMADIN: ICD-10-CM

## 2022-05-03 LAB
ALBUMIN SERPL-MCNC: 4.2 G/DL (ref 3.5–5.2)
ALP BLD-CCNC: 74 U/L (ref 35–104)
ALT SERPL-CCNC: 16 U/L (ref 9–52)
ANION GAP SERPL CALCULATED.3IONS-SCNC: 14 MMOL/L (ref 7–19)
AST SERPL-CCNC: 31 U/L (ref 14–36)
BILIRUB SERPL-MCNC: 0.6 MG/DL (ref 0.2–1.3)
BUN BLDV-MCNC: 29 MG/DL (ref 7–17)
CALCIUM SERPL-MCNC: 8.8 MG/DL (ref 8.4–10.2)
CHLORIDE BLD-SCNC: 101 MMOL/L (ref 98–111)
CO2: 24 MMOL/L (ref 22–29)
CREAT SERPL-MCNC: 1.4 MG/DL (ref 0.5–1)
FERRITIN: 187.8 NG/ML (ref 13–150)
GFR NON-AFRICAN AMERICAN: 37
GLOBULIN: 3.6 G/DL
GLUCOSE BLD-MCNC: 93 MG/DL (ref 74–106)
HCT VFR BLD CALC: 35.8 % (ref 34.1–44.9)
HEMOGLOBIN: 11.1 G/DL (ref 11.2–15.7)
IRON SATURATION: 16 % (ref 14–50)
IRON: 51 UG/DL (ref 37–145)
LYMPHOCYTES ABSOLUTE: 1.9 K/UL (ref 1.18–3.74)
LYMPHOCYTES RELATIVE PERCENT: 37 % (ref 19.3–53.1)
MCH RBC QN AUTO: 28.8 PG (ref 25.6–32.2)
MCHC RBC AUTO-ENTMCNC: 31 G/DL (ref 32.3–35.5)
MCV RBC AUTO: 92.7 FL (ref 79.4–94.8)
MONOCYTES ABSOLUTE: 0.5 K/UL (ref 0.24–0.82)
MONOCYTES RELATIVE PERCENT: 9.3 % (ref 4.7–12.5)
NEUTROPHILS ABSOLUTE: 2.8 K/UL (ref 1.56–6.13)
NEUTROPHILS RELATIVE PERCENT: 53.7 % (ref 34–71.1)
PDW BLD-RTO: 16.2 % (ref 11.7–14.4)
PLATELET # BLD: 142 K/UL (ref 182–369)
PMV BLD AUTO: 11.1 FL (ref 7.4–10.4)
POTASSIUM SERPL-SCNC: 3.9 MMOL/L (ref 3.5–5.1)
RBC # BLD: 3.86 M/UL (ref 3.93–5.22)
SODIUM BLD-SCNC: 139 MMOL/L (ref 137–145)
TOTAL IRON BINDING CAPACITY: 325 UG/DL (ref 250–400)
TOTAL PROTEIN: 7.9 G/DL (ref 6.3–8.2)
WBC # BLD: 5.2 K/UL (ref 3.98–10.04)

## 2022-05-03 PROCEDURE — 3017F COLORECTAL CA SCREEN DOC REV: CPT | Performed by: NURSE PRACTITIONER

## 2022-05-03 PROCEDURE — 99213 OFFICE O/P EST LOW 20 MIN: CPT | Performed by: NURSE PRACTITIONER

## 2022-05-03 PROCEDURE — G8417 CALC BMI ABV UP PARAM F/U: HCPCS | Performed by: NURSE PRACTITIONER

## 2022-05-03 PROCEDURE — 36415 COLL VENOUS BLD VENIPUNCTURE: CPT

## 2022-05-03 PROCEDURE — 99212 OFFICE O/P EST SF 10 MIN: CPT

## 2022-05-03 PROCEDURE — G8399 PT W/DXA RESULTS DOCUMENT: HCPCS | Performed by: NURSE PRACTITIONER

## 2022-05-03 PROCEDURE — 1123F ACP DISCUSS/DSCN MKR DOCD: CPT | Performed by: NURSE PRACTITIONER

## 2022-05-03 PROCEDURE — 85025 COMPLETE CBC W/AUTO DIFF WBC: CPT

## 2022-05-03 PROCEDURE — 1090F PRES/ABSN URINE INCON ASSESS: CPT | Performed by: NURSE PRACTITIONER

## 2022-05-03 PROCEDURE — 4040F PNEUMOC VAC/ADMIN/RCVD: CPT | Performed by: NURSE PRACTITIONER

## 2022-05-03 PROCEDURE — 80053 COMPREHEN METABOLIC PANEL: CPT

## 2022-05-03 PROCEDURE — 1036F TOBACCO NON-USER: CPT | Performed by: NURSE PRACTITIONER

## 2022-05-03 PROCEDURE — G8427 DOCREV CUR MEDS BY ELIG CLIN: HCPCS | Performed by: NURSE PRACTITIONER

## 2022-05-04 ASSESSMENT — ENCOUNTER SYMPTOMS
SHORTNESS OF BREATH: 0
WHEEZING: 0
COUGH: 0
EYE DISCHARGE: 0
CONSTIPATION: 0
DIARRHEA: 0
VOMITING: 0
BLOOD IN STOOL: 0
RESPIRATORY NEGATIVE: 1
NAUSEA: 0
SORE THROAT: 0
BACK PAIN: 0
EYES NEGATIVE: 1
EYE PAIN: 0
EYE REDNESS: 0
ABDOMINAL PAIN: 0
GASTROINTESTINAL NEGATIVE: 1

## 2022-05-05 ENCOUNTER — ANTI-COAG VISIT (OUTPATIENT)
Dept: PRIMARY CARE CLINIC | Age: 72
End: 2022-05-05
Payer: MEDICARE

## 2022-05-05 DIAGNOSIS — Z79.01 ANTICOAGULATED ON COUMADIN: Primary | ICD-10-CM

## 2022-05-05 LAB — INR BLD: 3.3

## 2022-05-05 PROCEDURE — 93793 ANTICOAG MGMT PT WARFARIN: CPT | Performed by: INTERNAL MEDICINE

## 2022-05-05 NOTE — PROGRESS NOTES
HOME MONITORING REPORT    INR today:   Results for orders placed or performed in visit on 05/05/22   Protime-INR   Result Value Ref Range    INR 3.30        INR Goal: 2.0-3.0    Dosing Plan  As of 5/5/2022    TTR:  27.1 % (4 y)   Full warfarin instructions:  5/5: 7.5 mg; Otherwise 7.5 mg every Mon, Wed, Fri; 15 mg all other days              PLAN: Advised patient/caregiver to decrease her dose tonight only to 7.5 mg, then continue current dose and recheck in one week. Patient/Caregiver voiced understanding      Electronically signed by Braden Cervantes MD on 5/5/2022 at 2:12 PM  I have reviewed nursing plan for Coumadin management and agree with plan.

## 2022-05-09 ENCOUNTER — OFFICE VISIT (OUTPATIENT)
Dept: NEUROLOGY | Facility: CLINIC | Age: 72
End: 2022-05-09

## 2022-05-09 VITALS
HEIGHT: 67 IN | RESPIRATION RATE: 20 BRPM | WEIGHT: 246 LBS | HEART RATE: 65 BPM | SYSTOLIC BLOOD PRESSURE: 180 MMHG | BODY MASS INDEX: 38.61 KG/M2 | DIASTOLIC BLOOD PRESSURE: 104 MMHG | OXYGEN SATURATION: 96 %

## 2022-05-09 DIAGNOSIS — G47.33 OBSTRUCTIVE SLEEP APNEA SYNDROME: Primary | ICD-10-CM

## 2022-05-09 PROCEDURE — 99213 OFFICE O/P EST LOW 20 MIN: CPT | Performed by: NURSE PRACTITIONER

## 2022-05-09 RX ORDER — POTASSIUM CHLORIDE 750 MG/1
10 TABLET, FILM COATED, EXTENDED RELEASE ORAL DAILY
COMMUNITY
Start: 2022-02-14

## 2022-05-09 RX ORDER — GABAPENTIN 300 MG/1
1 CAPSULE ORAL 3 TIMES DAILY
COMMUNITY
Start: 2022-04-19 | End: 2022-05-19

## 2022-05-09 RX ORDER — PANTOPRAZOLE SODIUM 40 MG/1
1 TABLET, DELAYED RELEASE ORAL
COMMUNITY
Start: 2022-02-14

## 2022-05-09 RX ORDER — QUINIDINE GLUCONATE 324 MG
1 TABLET, EXTENDED RELEASE ORAL 2 TIMES DAILY
COMMUNITY
Start: 2022-03-01

## 2022-05-09 RX ORDER — ISOPROPYL ALCOHOL 0.75 G/1
SWAB TOPICAL
COMMUNITY
Start: 2022-04-22 | End: 2022-08-29

## 2022-05-09 RX ORDER — HYDROCORTISONE 10 MG/G
CREAM TOPICAL
COMMUNITY
Start: 2022-04-19 | End: 2022-08-29

## 2022-05-09 RX ORDER — BLOOD-GLUCOSE CONTROL, LOW
EACH MISCELLANEOUS
COMMUNITY
Start: 2022-04-22

## 2022-05-09 RX ORDER — MONTELUKAST SODIUM 10 MG/1
1 TABLET ORAL DAILY
COMMUNITY
Start: 2022-04-19 | End: 2022-08-29

## 2022-05-09 RX ORDER — ROSUVASTATIN CALCIUM 5 MG/1
5 TABLET, COATED ORAL DAILY
COMMUNITY

## 2022-05-09 NOTE — PROGRESS NOTES
Neurology Progress Note      Chief Complaint:    Obstructive sleep apnea     Subjective     Subjective:  Veronica Stewart is a 71 y.o. female who presents to the office today for obstructive sleep apnea.  She is routinely followed by Zora Jackson MD for primary care.  She presents today with continued issues with her CPAP usage.  She has gotten in contact with [employer] and has gotten a new mask.  However, she continues to have issues with leaking and mask fit.  She continues to report daytime hypersomnolence, multiple awakenings at night, awakening suddenly with gasping, and non-restorative sleep. She has to take her card in for reading for compliance report; however, due to some issues she has been unable to do this secondary to not having a card in her machine.  She will be getting a new card from [employer] soon. She does, however, state she is using her CPAP more often than she had but with continued difficulties.  She has really had no changes.    Past Medical History:   Diagnosis Date   • Anxiety    • Arrhythmia    • Blood clot in vein 05/2018    Hospitalized    • Redd-tachy syndrome ([facility])    • Bradycardia    • Breath shortness    • Burn     left leg   • Cardiac pacemaker     11/09 [facility]   • Chest pain    • Chronic fatigue    • Depression    • Diabetes mellitus ([facility])    • Difficulty walking 5/17   • Dizziness    • Fatigue    • Fibromyalgia, primary 7/15   • Follow-up exam    • Headache, tension-type 9/2018   • Heart burn    • Hypercoagulable state, primary ([facility])     LUPUS ANTI-COAG   • Hyperlipidemia    • Hypertension    • Liver disease    • Neuropathy in diabetes ([facility]) 03/14   • Peripheral neuropathy 03/03   • Shingles 9/2018   • Shortness of breath    • Sleep apnea     complex.  using a c-pap machine   • Stroke ([facility])    • Syncope    • Tachy-redd syndrome ([facility])    • TIA (transient ischemic attack) 04/2003   • Vision loss in jr. high school    lBlind rt. eye     Past Surgical History:   Procedure Laterality Date    • APPENDECTOMY     • CARDIAC ELECTROPHYSIOLOGY PROCEDURE N/A 2/26/2020    Procedure: PPM generator change - dual;  Surgeon: Ronny Lowe MD;  Location: [facility] CATH INVASIVE LOCATION;  Service: Cardiology;  Laterality: N/A;   • CATARACT EXTRACTION     • CHOLECYSTECTOMY     • HERNIA REPAIR     • HYSTERECTOMY     • INSERT / REPLACE / REMOVE PACEMAKER     • OOPHORECTOMY     • PACEMAKER IMPLANTATION     • REPLACEMENT TOTAL KNEE Right 03/26/2018    Dr doherty    • TRAM-EN-Y PROCEDURE  2002    Dr Dahiana [facility]   • SPLENECTOMY       Family History   Problem Relation Age of Onset   • Coronary artery disease Mother    • Hyperlipidemia Mother    • Anemia Mother    • Cervical cancer Mother    • Kidney failure Father    • Heart failure Father    • Fibromyalgia Sister    • Anemia Sister    • Clotting disorder Sister    • Neuropathy Sister    • Alcohol abuse Brother    • Cancer Maternal Grandmother    • Diabetes Maternal Grandmother    • Heart failure Maternal Grandfather    • No Known Problems Paternal Grandmother    • No Known Problems Paternal Grandfather    • Colon cancer Brother      Social History     Tobacco Use   • Smoking status: Never Smoker   • Smokeless tobacco: Never Used   Vaping Use   • Vaping Use: Never used   Substance Use Topics   • Alcohol use: No   • Drug use: No     Medications:  Current Outpatient Medications   Medication Sig Dispense Refill   • Alcohol Swabs (B-D SINGLE USE SWABS REGULAR) pads      • aspirin 81 MG EC tablet Take 81 mg by mouth Daily.     • baclofen (LIORESAL) 10 MG tablet Take 10 mg by mouth 3 (Three) Times a Day.     • Blood Glucose Calibration (True Metrix Level 1) Low solution      • bumetanide (BUMEX) 1 MG tablet Take 2 mg by mouth Daily.     • calcipotriene (DOVONEX) 0.005 % cream Apply  topically to the appropriate area as directed As Needed.     • calcitriol (ROCALTROL) 0.25 MCG capsule Take 0.25 mcg by mouth Daily.     • clobetasol (TEMOVATE) 0.05 % cream Apply   topically 2 (Two) Times a Day.     • cyanocobalamin 1000 MCG/ML injection Inject 1,000 mcg into the shoulder, thigh, or buttocks Every 28 (Twenty-Eight) Days.     • escitalopram (LEXAPRO) 20 MG tablet Take 1 tablet by mouth Daily. 30 tablet 2   • ferrous gluconate (FERGON) 240 (27 FE) MG tablet Take 1 tablet by mouth 2 (Two) Times a Day.     • fexofenadine (ALLEGRA) 180 MG tablet Take 180 mg by mouth Daily.     • gabapentin (NEURONTIN) 300 MG capsule Take 1 capsule by mouth 3 (Three) Times a Day.     • Hydrocortisone, Perianal, (PROCTOCORT) 1 % cream rectal cream Apply topically 2 times daily.     • levothyroxine (SYNTHROID, LEVOTHROID) 100 MCG tablet Take 100 mcg by mouth Daily.     • lisinopril (PRINIVIL,ZESTRIL) 40 MG tablet Take 40 mg by mouth Daily.     • montelukast (SINGULAIR) 10 MG tablet Take 1 tablet by mouth Daily.     • ondansetron (ZOFRAN) 4 MG tablet Take 4 mg by mouth Daily As Needed for Nausea or Vomiting.     • pantoprazole (PROTONIX) 40 MG EC tablet Take 1 tablet by mouth 2 (Two) Times a Day Before Meals.     • potassium chloride 10 MEQ CR tablet Take 1 tablet by mouth Daily.     • rosuvastatin (CRESTOR) 5 MG tablet Take 5 mg by mouth Daily.     • Semaglutide,0.25 or 0.5MG/DOS, (OZEMPIC) 2 MG/1.5ML solution pen-injector Inject 0.25 mg under the skin into the appropriate area as directed.     • sucralfate (CARAFATE) 1 g tablet Take 1 g by mouth 4 (Four) Times a Day.     • tiotropium (SPIRIVA HANDIHALER) 18 MCG per inhalation capsule Place 18 mcg into inhaler and inhale Daily.     • warfarin (COUMADIN) 7.5 MG tablet Take 7.5 mg by mouth Daily. Alternating 7.5mg with 15mg     • CloNIDine (CATAPRES) 0.1 MG tablet Take 0.1 mg by mouth 2 (Two) Times a Day As Needed.     • Diclofenac Sodium 2 % solution Place 1 applicator on the skin as directed by provider 2 (Two) Times a Day.     • Ferrous Gluconate 239 (27 Fe) MG tablet Take 1 tablet by mouth 3 (Three) Times a Day.     • gabapentin (NEURONTIN) 100 MG  capsule Take 100 mg by mouth 3 (Three) Times a Day.     • Multiple Vitamins-Minerals (MULTIVITAMIN WITH MINERALS) tablet tablet Take 1 tablet by mouth Daily.     • pantoprazole (PROTONIX) 40 MG EC tablet Take 40 mg by mouth Daily.     • polyethylene glycol (MIRALAX) packet Take 17 g by mouth As Needed.     • pregabalin (LYRICA) 75 MG capsule Take 75 mg by mouth 3 (Three) Times a Day.     • vitamin D (ERGOCALCIFEROL) 1.25 MG (21144 UT) capsule capsule Take 50,000 Units by mouth 2 (Two) Times a Week.       No current facility-administered medications for this visit.     Current outpatient and discharge medications have been reconciled for the patient.  Reviewed by: CATRACHITO Mcdonnell      Allergies:    Bee venom, Insect extract allergy skin test, Percocet [oxycodone-acetaminophen], Prednisone, Tizanidine hcl, Ultram [tramadol hcl], Hydrocodone-acetaminophen, and Other    Review of Systems:   Review of Systems   Psychiatric/Behavioral: Positive for sleep disturbance.   All other systems reviewed and are negative.        Objective      Vital Signs  Heart Rate:  [65] 65  Resp:  [20] 20  BP: (180)/(104) 180/104    Physical Exam:  Physical Exam  Constitutional:       Appearance: Normal appearance. She is obese.   HENT:      Head: Normocephalic.   Eyes:      Extraocular Movements: Extraocular movements intact.      Pupils: Pupils are equal, round, and reactive to light.   Cardiovascular:      Rate and Rhythm: Normal rate and regular rhythm.      Pulses: Normal pulses.   Pulmonary:      Effort: Pulmonary effort is normal.   Musculoskeletal:         General: Normal range of motion.      Cervical back: Normal range of motion and neck supple.   Skin:     General: Skin is warm and dry.      Capillary Refill: Capillary refill takes less than 2 seconds.   Neurological:      Gait: Gait is intact.   Psychiatric:         Mood and Affect: Mood normal.       Mallampati Classification: II (hard and soft palate, upper portion of  tonsils anduvula visible)       Results Review:      [place] Sleepiness Scale: 21    STOP-BANG: High risk FRANCIE    Compliance Report:       Assessment/Plan     Impression:  • Obstructive sleep apnea  • Poor Compliance with BiPAP      Plan:  · Continue with  BiPAP therapy.  I had very lengthy discussion with the importance of compliance with therapy.  I have encouraged her to continue to attempt to use her BiPAP even with her current supplies.  The patient recognizes the need for adherence to the prescribed therapy.    I again explained to her that the reason why she continues to have such daytime somnolence is very likely related to the fact that she is not using her BiPAP.  She states understanding.    · She is to get new card from [employer].  She is going to try a nasal pillow to see if this helps her tolerate her BiPAP more.      · I have recommended regular cardiovascular exercise in the form of walking, biking or swimming 30-40 minutes at a time at least 3-4 times per week.    · Counseled on multimodal approach to treatment of sleep apnea to include but not limited to diet, exercise, sleep hygiene, compliance with pap therapy.     · Encouraged lateral sleeping position and to avoid sedatives or alcohol close to bedtime.     · Risks of untreated sleep apnea were discussed to include but not limited to HTN, heart disease, stroke, cardiac arrhythmia such as AFIB, and dementia.    · I have reviewed the current compliance download with the patient in detail.  She  understands that a certain level of compliance allows for continued insurance coverage as well as adequate treatment of underlying apnea.  She also understands the impact this has upon their overall health status and other medical comorbidities.     The plan of care was fully discussed with the patient and they are in full agreement at this time.     Follow-Up:  Return in about 6 months (around 11/9/2022) for Obstructive sleep apnea follow-up.      Caleb GASPAR  CATRACHITO Baldwin  05/09/22  11:13 CDT

## 2022-05-12 LAB — INR BLD: 3.7

## 2022-05-13 ENCOUNTER — ANTI-COAG VISIT (OUTPATIENT)
Dept: INTERNAL MEDICINE | Age: 72
End: 2022-05-13
Payer: MEDICARE

## 2022-05-13 DIAGNOSIS — Z79.01 ANTICOAGULATED ON COUMADIN: Primary | ICD-10-CM

## 2022-05-13 PROCEDURE — 93793 ANTICOAG MGMT PT WARFARIN: CPT | Performed by: INTERNAL MEDICINE

## 2022-05-13 NOTE — PROGRESS NOTES
Called pt and LM for her with the instructions. Also sent pt a Siste[employer] message with the instructions. Electronically signed by Elida Barakat MD on 5/15/2022 at 10:08 AM    I have reviewed nursing plan for Coumadin management and agree with plan.

## 2022-05-19 LAB — INR BLD: 3.2

## 2022-05-20 ENCOUNTER — ANTI-COAG VISIT (OUTPATIENT)
Dept: INTERNAL MEDICINE | Age: 72
End: 2022-05-20
Payer: MEDICARE

## 2022-05-20 DIAGNOSIS — Z79.01 ANTICOAGULATED ON COUMADIN: Primary | ICD-10-CM

## 2022-05-20 PROCEDURE — 99999 PR OFFICE/OUTPT VISIT,PROCEDURE ONLY: CPT | Performed by: INTERNAL MEDICINE

## 2022-05-20 PROCEDURE — 93793 ANTICOAG MGMT PT WARFARIN: CPT | Performed by: INTERNAL MEDICINE

## 2022-05-20 RX ORDER — FERROUS GLUCONATE 270(27)MG
TABLET ORAL
Qty: 180 TABLET | Refills: 0 | Status: SHIPPED | OUTPATIENT
Start: 2022-05-20 | End: 2022-06-28 | Stop reason: SDUPTHER

## 2022-05-20 RX ORDER — FEXOFENADINE HCL 180 MG/1
TABLET ORAL
Qty: 90 TABLET | Refills: 0 | Status: SHIPPED | OUTPATIENT
Start: 2022-05-20 | End: 2022-08-26 | Stop reason: SDUPTHER

## 2022-05-20 NOTE — PROGRESS NOTES
Called the pt and she stated someone had already contacted her with the instructions. Electronically signed by La Busby MD on 5/20/2022 at 1:30 PM    I have reviewed nursing plan for Coumadin management and agree with plan.

## 2022-05-26 ENCOUNTER — ANTI-COAG VISIT (OUTPATIENT)
Dept: INTERNAL MEDICINE | Age: 72
End: 2022-05-26
Payer: MEDICARE

## 2022-05-26 DIAGNOSIS — Z79.01 ANTICOAGULATED ON COUMADIN: Primary | ICD-10-CM

## 2022-05-26 LAB — INR BLD: 1.6

## 2022-05-26 PROCEDURE — 93793 ANTICOAG MGMT PT WARFARIN: CPT | Performed by: INTERNAL MEDICINE

## 2022-05-26 PROCEDURE — 99999 PR OFFICE/OUTPT VISIT,PROCEDURE ONLY: CPT | Performed by: INTERNAL MEDICINE

## 2022-05-26 NOTE — PROGRESS NOTES
Pt notified and VU to do the below.   PLAN: She needs to take an extra 7.5 today and tomorrow then resume her usual dose

## 2022-05-26 NOTE — PROGRESS NOTES
HOME MONITORING REPORT    INR today:   Results for orders placed or performed in visit on 05/26/22   Protime-INR   Result Value Ref Range    INR 1.60        INR Goal: 2.0-3.0    Dosing Plan  As of 5/26/2022    TTR:  27.0 % (4 y)   Full warfarin instructions:  7.5 mg every Mon, Wed, Fri; 15 mg all other days              PLAN: She needs to take an extra 7.5 today and tomorrow then resume her usual dose

## 2022-05-30 LAB — INR BLD: 1.8

## 2022-06-06 ENCOUNTER — ANTI-COAG VISIT (OUTPATIENT)
Dept: INTERNAL MEDICINE | Age: 72
End: 2022-06-06
Payer: MEDICARE

## 2022-06-06 ENCOUNTER — ANTI-COAG VISIT (OUTPATIENT)
Dept: INTERNAL MEDICINE | Age: 72
End: 2022-06-06

## 2022-06-06 DIAGNOSIS — Z79.01 ANTICOAGULATED ON COUMADIN: Primary | ICD-10-CM

## 2022-06-06 LAB — INR BLD: 2.7

## 2022-06-06 PROCEDURE — 93793 ANTICOAG MGMT PT WARFARIN: CPT | Performed by: INTERNAL MEDICINE

## 2022-06-06 PROCEDURE — 99999 PR OFFICE/OUTPT VISIT,PROCEDURE ONLY: CPT | Performed by: INTERNAL MEDICINE

## 2022-06-06 NOTE — PROGRESS NOTES
HOME MONITORING REPORT    INR today:   Results for orders placed or performed in visit on 06/06/22   Protime-INR   Result Value Ref Range    INR 2.70        INR Goal: 2.0-3.0    Dosing Plan  As of 6/6/2022    TTR:  27.1 % (4 y)   Full warfarin instructions:  7.5 mg every Mon, Wed, Fri; 15 mg all other days              PLAN: Advised patient/caregiver to continue current dose and recheck in one week. Patient/Caregiver voiced understanding      Electronically signed by Iam Tapia MD on 6/6/2022 at 3:12 PM      I have reviewed nursing plan for Coumadin management and agree with plan.

## 2022-06-06 NOTE — PROGRESS NOTES
HOME MONITORING REPORT    INR today:   Results for orders placed or performed in visit on 06/06/22   Protime-INR   Result Value Ref Range    INR 1.80        INR Goal: 2.0-3.0    Dosing Plan  As of 6/6/2022    TTR:  26.9 % (4 y)   Full warfarin instructions:  7.5 mg every Mon, Wed, Fri; 15 mg all other days              PLAN: 15 mg tonight. Resume current dose tomorrow.  Check in a week

## 2022-06-17 ENCOUNTER — ANTI-COAG VISIT (OUTPATIENT)
Dept: INTERNAL MEDICINE | Age: 72
End: 2022-06-17
Payer: MEDICARE

## 2022-06-17 DIAGNOSIS — Z79.01 ANTICOAGULATED ON COUMADIN: Primary | ICD-10-CM

## 2022-06-17 LAB — INR BLD: 1.7

## 2022-06-17 PROCEDURE — 93793 ANTICOAG MGMT PT WARFARIN: CPT | Performed by: INTERNAL MEDICINE

## 2022-06-17 PROCEDURE — 99999 PR OFFICE/OUTPT VISIT,PROCEDURE ONLY: CPT | Performed by: INTERNAL MEDICINE

## 2022-06-17 NOTE — PATIENT INSTRUCTIONS
Per Dr. Amelia Cabrera pt is to take 15mg tonight resume current dose tomorrow and recheck in 1 week. Pt has been notified and KENNETH

## 2022-06-17 NOTE — PROGRESS NOTES
Pt has been notified of recommendations and voiced understanding    Electronically signed by Jaquelin Colvin MD on 6/17/2022 at 3:36 PM

## 2022-06-22 ENCOUNTER — ANTI-COAG VISIT (OUTPATIENT)
Dept: INTERNAL MEDICINE | Age: 72
End: 2022-06-22
Payer: MEDICARE

## 2022-06-22 DIAGNOSIS — Z79.01 ANTICOAGULATED ON COUMADIN: Primary | ICD-10-CM

## 2022-06-22 LAB — INR BLD: 2.9

## 2022-06-22 PROCEDURE — 99999 PR OFFICE/OUTPT VISIT,PROCEDURE ONLY: CPT | Performed by: INTERNAL MEDICINE

## 2022-06-22 PROCEDURE — 93793 ANTICOAG MGMT PT WARFARIN: CPT | Performed by: INTERNAL MEDICINE

## 2022-06-22 NOTE — PROGRESS NOTES
HOME MONITORING REPORT    INR today:   Results for orders placed or performed in visit on 06/22/22   Protime-INR   Result Value Ref Range    INR 2.90        INR Goal: 2.0-3.0    Dosing Plan  As of 6/22/2022    TTR:  27.6 % (4.1 y)   Full warfarin instructions:  7.5 mg every Mon, Wed, Fri; 15 mg all other days              PLAN: Advised patient/caregiver to continue current dose and recheck in one week. Patient/Caregiver voiced understanding    Electronically signed by Emma Stevenson MD on 6/23/2022 at 5:11 AM    I have reviewed nursing plan for Coumadin management and agree with plan.

## 2022-06-28 ENCOUNTER — OFFICE VISIT (OUTPATIENT)
Dept: INTERNAL MEDICINE | Age: 72
End: 2022-06-28
Payer: MEDICARE

## 2022-06-28 VITALS
HEIGHT: 67 IN | HEART RATE: 69 BPM | BODY MASS INDEX: 37.2 KG/M2 | OXYGEN SATURATION: 97 % | WEIGHT: 237 LBS | DIASTOLIC BLOOD PRESSURE: 86 MMHG | SYSTOLIC BLOOD PRESSURE: 126 MMHG

## 2022-06-28 DIAGNOSIS — M17.12 OSTEOARTHRITIS OF LEFT KNEE, UNSPECIFIED OSTEOARTHRITIS TYPE: ICD-10-CM

## 2022-06-28 DIAGNOSIS — Z01.818 PREOP EXAMINATION: Primary | ICD-10-CM

## 2022-06-28 DIAGNOSIS — E11.69 TYPE 2 DIABETES MELLITUS WITH OTHER SPECIFIED COMPLICATION, UNSPECIFIED WHETHER LONG TERM INSULIN USE (HCC): ICD-10-CM

## 2022-06-28 DIAGNOSIS — Z86.718 HISTORY OF DVT (DEEP VEIN THROMBOSIS): ICD-10-CM

## 2022-06-28 DIAGNOSIS — I10 PRIMARY HYPERTENSION: ICD-10-CM

## 2022-06-28 DIAGNOSIS — D68.62 LUPUS ANTICOAGULANT DISORDER (HCC): ICD-10-CM

## 2022-06-28 DIAGNOSIS — E03.9 HYPOTHYROIDISM, UNSPECIFIED TYPE: ICD-10-CM

## 2022-06-28 DIAGNOSIS — F32.89 OTHER DEPRESSION: ICD-10-CM

## 2022-06-28 PROCEDURE — G8399 PT W/DXA RESULTS DOCUMENT: HCPCS | Performed by: INTERNAL MEDICINE

## 2022-06-28 PROCEDURE — 99214 OFFICE O/P EST MOD 30 MIN: CPT | Performed by: INTERNAL MEDICINE

## 2022-06-28 PROCEDURE — 1036F TOBACCO NON-USER: CPT | Performed by: INTERNAL MEDICINE

## 2022-06-28 PROCEDURE — G8427 DOCREV CUR MEDS BY ELIG CLIN: HCPCS | Performed by: INTERNAL MEDICINE

## 2022-06-28 PROCEDURE — 1090F PRES/ABSN URINE INCON ASSESS: CPT | Performed by: INTERNAL MEDICINE

## 2022-06-28 PROCEDURE — 1123F ACP DISCUSS/DSCN MKR DOCD: CPT | Performed by: INTERNAL MEDICINE

## 2022-06-28 PROCEDURE — 3017F COLORECTAL CA SCREEN DOC REV: CPT | Performed by: INTERNAL MEDICINE

## 2022-06-28 PROCEDURE — G8417 CALC BMI ABV UP PARAM F/U: HCPCS | Performed by: INTERNAL MEDICINE

## 2022-06-28 PROCEDURE — 2022F DILAT RTA XM EVC RTNOPTHY: CPT | Performed by: INTERNAL MEDICINE

## 2022-06-28 PROCEDURE — 3044F HG A1C LEVEL LT 7.0%: CPT | Performed by: INTERNAL MEDICINE

## 2022-06-28 RX ORDER — QUINIDINE GLUCONATE 324 MG
TABLET, EXTENDED RELEASE ORAL
Qty: 180 TABLET | Refills: 0 | Status: SHIPPED | OUTPATIENT
Start: 2022-06-28 | End: 2022-08-30 | Stop reason: SDUPTHER

## 2022-06-28 NOTE — PROGRESS NOTES
Chief Complaint   Patient presents with    Surgical Clearance       HPI: Valentina Hermosillo is a 67 y.o. female is here for operative risk assessment prior to undergoing left knee replacement surgery on July 19 Dr. Christopher Li at the [facility]. She is going to have her left knee replaced. She states that her cardiologist has already cleared her for surgery. According to Dr. Tata Castrejon note, she is already received cardiac clearance and nephrology clearance. She is currently homeless. She is more anxious in regards to this. Apparently, she was living in a rental home. The owner of the home has put it up for sale. Do not have any lab work on this patient as of yet. She denies any complaints of chest pain, chest pressure or shortness of breath. She does plan to go home after having her surgery. She will likely stay with her mother postoperatively. She has not had any blood in her stools. She denies any complaints of nausea or vomiting. She has not had any shortness of breath or wheezing. She denies any history of lower extremity edema. Has had a history of lupus anticoagulant and she also has a history of coat syndrome, which makes her more prone to blood clots. However, she does have a known history of noncompliance with anticoagulation and not taking her medications. Currently, she is taking her medications as prescribed. Her blood pressure is well controlled. However, she will need to be monitored closely in the outpatient setting for the possibility of a DVT. She states that her blood sugars are doing well. Her most recent A1c was at 0.7. She does have a history of chronic anemia. She is taking iron supplementation in regards to this.     Past Medical History:   Diagnosis Date    Abdominal pain     Abnormal EKG     Acute sinusitis     Acute superficial venous thrombosis of left lower extremity     Allergic reaction to spider bite     Anemia     Anticoagulated     Anxiety     Arrhythmia     Asthmatic bronchitis without complication 1/41/8742    Ataxic gait     Lyons's esophagus     Bowel obstruction (HCC)     Burn of abdomen wall, second degree, initial encounter 8/27/2018    Callus     Cardiac pacemaker     Cerebral artery occlusion with cerebral infarction (HCC)     CKD (chronic kidney disease) stage 2, GFR 60-89 ml/min     Coat's syndrome     Coat's syndrome     both eyes    COPD (chronic obstructive pulmonary disease) (HCC)     Depression     Diabetes mellitus type 2 in nonobese (HCC)     Diabetic nephropathy (HCC)     Disequilibrium     Dizziness     DVT (deep venous thrombosis) (HCC)     Exudative retinopathy     Fibromyalgia     Fibromyositis     Gastric ulcer     GERD (gastroesophageal reflux disease)     History of gastric bypass     Hx of blood clots     Hx of lupus anticoagulant disorder     Hyperlipidemia 5/6/2019    Hypertension     Hypothyroidism     Intermittent claudication (HCC)     Intestinal obstruction (HCC)     Iron deficiency     Left-sided weakness     Low vitamin D level     Lupus (HCC)     Menopause     Obesity     Osteoarthritis     Osteoporosis     Other iron deficiency anemias     Palliative care patient 09/24/2020    Pernicious anemia     PONV (postoperative nausea and vomiting)     PUPP (pruritic urticarial papules and plaques of pregnancy)     Restless legs syndrome (RLS) 7/11/2019    Right leg numbness     Right sided sciatica     Sarcoidosis     with liver involvement    Sciatica     Secondary hyperparathyroidism (HCC)     Shingles     Shortness of breath     Sleep apnea     Stomach ulcer     Syncope     Visual loss, one eye       Past Surgical History:   Procedure Laterality Date    APPENDECTOMY      CARDIAC CATHETERIZATION  10/21/13  [facility]    EF over 60%    CHOLECYSTECTOMY      COLONOSCOPY  02/2010    negative    COLONOSCOPY  02/22/2010    Dr Desirae Patino COLONOSCOPY  04/01/2016    Dr Ayo Lozano Alexandru-internal hemorrhoids, 5 yr recall    COLONOSCOPY N/A 05/07/2021    Dr Angelica England looping/tortuosity throughout the left colon, BE=Normal    DILATATION, ESOPHAGUS      EYE SURGERY      Cyst on Right eye    EYE SURGERY      GASTRIC BYPASS SURGERY      GASTRIC BYPASS SURGERY      HERNIA REPAIR      HYSTERECTOMY (CERVIX STATUS UNKNOWN)      Complete    HYSTERECTOMY (CERVIX STATUS UNKNOWN)      Partial - because had a tubal pregnancy.      INCONTINENCE SURGERY      Bladder Sling    INFECTED SKIN DEBRIDEMENT Right     dog bite right forearm    OTHER SURGICAL HISTORY      IVC filter    PACEMAKER INSERTION      PACEMAKER PLACEMENT      medtronic    CO TOTAL KNEE ARTHROPLASTY Right 03/26/2018    TOTAL KNEE REPLACEMENT COMPLEX PRIMARY performed by Prieto Franco MD at [facility] SMALL INTESTINE SURGERY      SPLENECTOMY      KRISTEL AND BSO (CERVIX REMOVED)      TONSILLECTOMY AND ADENOIDECTOMY      TOTAL KNEE ARTHROPLASTY      UPPER GASTROINTESTINAL ENDOSCOPY  12/2011    gerd s/p gastric bypass    UPPER GASTROINTESTINAL ENDOSCOPY  02/2014    normal s/p gastric bypass    UPPER GASTROINTESTINAL ENDOSCOPY  02/2010    biopsy neg Barretts, chronic reflux esophagitis s/p gastric bypass    UPPER GASTROINTESTINAL ENDOSCOPY  07/2006    unremarkable s/p gastric bypass    UPPER GASTROINTESTINAL ENDOSCOPY  08/10/2015    Dr Aguilar Kapadia UPPER GASTROINTESTINAL ENDOSCOPY N/A 04/01/2016    Dr. Kelsi Ritchie:  H Pylori(-), HH, o/w normal    UPPER GASTROINTESTINAL ENDOSCOPY N/A 05/07/2021    Dr Tj Palma hiatal hernia, previous gastric bypass surgery, gastritis    VENA CAVA FILTER PLACEMENT Right 2003      Social History     Socioeconomic History    Marital status:      Spouse name: None    Number of children: 1    Years of education: None    Highest education level: None   Occupational History     Employer: [employer]    Tobacco Use    Smoking status: Never Smoker    Smokeless tobacco: Never Used   Vaping Use    Vaping Use: Never used   Substance and Sexual Activity    Alcohol use: No    Drug use: No    Sexual activity: Not Currently   Other Topics Concern    None   Social History Narrative    Lives with daughter, son-in-law, granddaughter, niece. Drives very little, daughter usually transports pt. Works part time at [employer]. Has pet dog. Social Determinants of Health     Financial Resource Strain: Medium Risk    Difficulty of Paying Living Expenses: Somewhat hard   Food Insecurity: Food Insecurity Present    Worried About Running Out of Food in the Last Year: Sometimes true    Cher of Food in the Last Year: Never true   Transportation Needs: No Transportation Needs    Lack of Transportation (Medical): No    Lack of Transportation (Non-Medical):  No   Physical Activity: Inactive    Days of Exercise per Week: 0 days    Minutes of Exercise per Session: 0 min   Stress:     Feeling of Stress : Not on file   Social Connections:     Frequency of Communication with Friends and Family: Not on file    Frequency of Social Gatherings with Friends and Family: Not on file    Attends Hoahaoism Services: Not on file    Active Member of [address] [place] [address] "[employer]" or Organizations: Not on file    Attends Club or Organization Meetings: Not on file    Marital Status: Not on file   Intimate Partner Violence:     Fear of Current or Ex-Partner: Not on file    Emotionally Abused: Not on file    Physically Abused: Not on file    Sexually Abused: Not on file   Housing Stability:     Unable to Pay for Housing in the Last Year: Not on file    Number of [place] in the Last Year: Not on file    Unstable Housing in the Last Year: Not on file      Family History   Problem Relation Age of Onset    Uterine Cancer Mother     Cervical Cancer Mother     Coronary Art Dis Mother     Heart Disease Father     Lung Cancer Father     Other Father         renal failure    Colon Cancer Brother     Colon Polyps Brother     Uterine Cancer Maternal Grandmother     Cervical Cancer Maternal Grandmother     Diabetes Maternal Grandmother     Cervical Cancer Sister     Other Sister         fibromyalgia    Diabetes Paternal Aunt     Breast Cancer Maternal Cousin     Esophageal Cancer Neg Hx     Liver Cancer Neg Hx     Liver Disease Neg Hx     Stomach Cancer Neg Hx     Rectal Cancer Neg Hx         Current Outpatient Medications   Medication Sig Dispense Refill    ferrous gluconate (FERGON) 240 (27 Fe) MG tablet Take 1 tablet by mouth twice daily 180 tablet 0    fexofenadine (ALLEGRA) 180 MG tablet Take 1 tablet by mouth once daily 90 tablet 0    Alcohol Swabs (B-D SINGLE USE SWABS REGULAR) PADS TID E11.21 300 each 3    Blood Glucose Calibration (TRUE METRIX LEVEL 1) Low SOLN TID E11.21 3 each 3    montelukast (SINGULAIR) 10 MG tablet Take 1 tablet by mouth daily 90 tablet 3    Hydrocortisone, Perianal, (PROCTO-CASPER) 1 % cream Apply topically 2 times daily.  1 each 1    gabapentin (NEURONTIN) 300 MG capsule TAKE 1 CAPSULE BY MOUTH THREE TIMES DAILY 270 capsule 0    Semaglutide,0.25 or 0.5MG/DOS, 2 MG/1.5ML SOPN Inject 0.25 mg into the skin once a week 3 pen 1    sucralfate (CARAFATE) 1 GM tablet TAKE 1 TABLET FOUR TIMES DAILY 360 tablet 0    calcitRIOL (ROCALTROL) 0.25 MCG capsule TAKE 1 CAPSULE EVERY DAY 90 capsule 0    ondansetron (ZOFRAN) 4 MG tablet Take 1 tablet by mouth every 8 hours as needed for Nausea 30 tablet 1    baclofen (LIORESAL) 10 MG tablet Take 1 tablet by mouth 3 times daily As needed for hip pain (Patient taking differently: Take 10 mg by mouth 3 times daily as needed As needed for hip pain) 30 tablet 1    Cyanocobalamin 1000 MCG/ML KIT Inject 1 mL as directed every 30 days 3 kit 1    calcipotriene (DOVONEX) 0.005 % cream Use topically as needed (Patient taking differently: Apply 1 Dose topically 2 times daily as needed Use topically as needed) 3 each 1    escitalopram (LEXAPRO) 20 MG tablet Take 1 tablet by mouth daily 90 tablet 3    tiotropium (SPIRIVA RESPIMAT) 2.5 MCG/ACT AERS inhaler Inhale 2 puffs into the lungs daily 3 each 1    bumetanide (BUMEX) 2 MG tablet Take 1 tablet by mouth daily 30 tablet 5    levothyroxine (SYNTHROID) 100 MCG tablet Take 1 tablet by mouth Daily 90 tablet 3    potassium chloride (KLOR-CON) 10 MEQ extended release tablet Take 1 tablet by mouth daily 60 tablet 5    warfarin (COUMADIN) 7.5 MG tablet 7.5mg on Monday/Wed/Fri and 15mg all other days (Patient taking differently: Take 7.5 mg by mouth daily 7.5mg on Monday/Wed/Fri and 15mg all other days) 180 tablet 3    pantoprazole (PROTONIX) 40 MG tablet TAKE 1 TABLET BY MOUTH TWICE DAILY BEFORE MEAL(S) (Patient taking differently: Take 40 mg by mouth in the morning and at bedtime TAKE 1 TABLET BY MOUTH TWICE DAILY BEFORE MEAL(S)) 180 tablet 1    rosuvastatin (CRESTOR) 5 MG tablet Take 1 tablet by mouth daily 30 tablet 5    clobetasol (TEMOVATE) 0.05 % cream Apply topically 2 times daily.  3 each 2    lisinopril (PRINIVIL;ZESTRIL) 40 MG tablet Take 1 tablet by mouth once daily (Patient taking differently: Take 40 mg by mouth daily Take 1 tablet by mouth once daily) 90 tablet 1    CPAP Machine MISC Inhale 1 each into the lungs nightly      Multiple Vitamins-Minerals (CENTRUM ADULTS) TABS Take 1 tablet by mouth daily      aspirin 81 MG tablet Take 81 mg by mouth daily       Current Facility-Administered Medications   Medication Dose Route Frequency Provider Last Rate Last Admin    cyanocobalamin injection 1,000 mcg  1,000 mcg IntraMUSCular Once Vadim Israel MD            Patient Active Problem List   Diagnosis    Gastroesophageal reflux disease    History of gastric bypass    Family history of colon cancer    Fibromyalgia    Encounter for current long-term use of anticoagulants    Primary osteoarthritis of right knee    Arthritis of knee    Essential hypertension    Hyperglycemia    Complex sleep apnea syndrome    Iron deficiency anemia    Restrictive airway disease    DVT, lower extremity, proximal, acute, unspecified laterality (HCC)    History of ulcer disease    Anticoagulated on Coumadin    Postmenopausal osteoporosis    Diabetes mellitus ([address]    Pacemaker    History of DVT (deep vein thrombosis)    Lupus anticoagulant disorder ([address]    History of pulmonary embolism    Thrombocytopenia ([address]    Hypothyroidism    Morbid obesity due to excess calories ([address]    Asthmatic bronchitis without complication    Gastroenteritis    Colic    Abdominal pain    Non-intractable vomiting with nausea    Severe episode of recurrent major depressive disorder, without psychotic features ([address]    Lower abdominal pain    Chronic heartburn    S/P gastric bypass    Dog bite    Cellulitis of right upper extremity    Abscess of right arm    Soft tissue infection    Skin ulcer of upper arm, with fat layer exposed ([address]    History of Gage-en-Y gastric bypass    Hyperlipidemia    Hypersomnia    Nonrheumatic mitral valve regurgitation    Obesity, Class II, BMI 35-39.9    Peripheral edema    Restless legs syndrome (RLS)    Vitamin D deficiency    Chronic deep vein thrombosis (DVT) of proximal vein of lower extremity (HCC)    Unspecified severe protein-calorie malnutrition (HCC)    H/O systemic lupus erythematosus (SLE) (HCC)    Type II diabetes mellitus with nephropathy (HCC)    Stable angina (HCC)    Stage 3a chronic kidney disease (HCC)    Chronic renal disease, stage III ([address] [[id number]]        Review of Systems   Constitutional: Positive for fatigue. Negative for activity change, appetite change, chills, diaphoresis, fever and unexpected weight change. HENT: Negative for congestion, ear pain, hearing loss, nosebleeds, postnasal drip, rhinorrhea, sinus pressure, sinus pain, sneezing, sore throat, tinnitus, trouble swallowing and voice change.     Eyes: ill-appearing, toxic-appearing or diaphoretic. HENT:      Head: Normocephalic and atraumatic. Right Ear: Tympanic membrane, ear canal and external ear normal. There is no impacted cerumen. Left Ear: Tympanic membrane, ear canal and external ear normal.      Nose: Nose normal. No congestion or rhinorrhea. Mouth/Throat:      Mouth: Mucous membranes are moist.      Pharynx: Oropharynx is clear. No oropharyngeal exudate or posterior oropharyngeal erythema. Eyes:      General: No scleral icterus. Right eye: No discharge. Left eye: No discharge. Extraocular Movements: Extraocular movements intact. Conjunctiva/sclera: Conjunctivae normal.      Pupils: Pupils are equal, round, and reactive to light. Neck:      Thyroid: No thyromegaly. Vascular: No carotid bruit or JVD. Trachea: No tracheal deviation. Cardiovascular:      Rate and Rhythm: Normal rate and regular rhythm. Pulses: Normal pulses. Heart sounds: Normal heart sounds. No murmur heard. No friction rub. No gallop. Pulmonary:      Effort: Pulmonary effort is normal. No respiratory distress. Breath sounds: Normal breath sounds. No stridor. No wheezing, rhonchi or rales. Chest:      Chest wall: No tenderness. Abdominal:      General: Abdomen is flat. Bowel sounds are normal. There is no distension. Palpations: Abdomen is soft. There is no mass. Tenderness: There is no abdominal tenderness. There is no right CVA tenderness, left CVA tenderness, guarding or rebound. Hernia: No hernia is present. Musculoskeletal:         General: Tenderness present. No swelling, deformity or signs of injury. Normal range of motion. Cervical back: Normal range of motion. No rigidity. No muscular tenderness. Lumbar back: Spasms present. No swelling or bony tenderness. Normal range of motion. Back:       Right lower leg: No edema. Left lower leg: No edema.       Comments: Does have some hip pain on palpation of the left hip she also has calf pain and tenderness on palpation of the right calf. She does have some edema. There is no erythema noted. Visual inspection:  Deformity/amputation: absent  Skin lesions/pre-ulcerative calluses: present to bottom of the left foot  Edema: right- negative, left- negative    Sensory exam:  Monofilament sensation: normal  (minimum of 5 random plantar locations tested, avoiding callused areas - > 1 area with absence of sensation is + for neuropathy)    Plus at least one of the following:  Pulses: normal,   Pinprick: Intact  Proprioception: N/A  Vibration (128 Hz): N/A   Lymphadenopathy:      Cervical: No cervical adenopathy. Skin:     General: Skin is warm and dry. Capillary Refill: Capillary refill takes less than 2 seconds. Coloration: Skin is not jaundiced or pale. Findings: No bruising, erythema, lesion or rash. Neurological:      General: No focal deficit present. Mental Status: She is alert and oriented to person, place, and time. Mental status is at baseline. Cranial Nerves: No cranial nerve deficit. Sensory: No sensory deficit. Motor: No weakness or abnormal muscle tone. Coordination: Coordination normal.      Gait: Gait normal.      Deep Tendon Reflexes: Reflexes normal.   Psychiatric:         Mood and Affect: Mood normal. Mood is not anxious or depressed. Behavior: Behavior normal.         Thought Content: Thought content normal.         Judgment: Judgment normal.         1. Preop examination    2. Osteoarthritis of left knee, unspecified osteoarthritis type    3. History of DVT (deep vein thrombosis)    4. Lupus anticoagulant disorder ([facility]    5. Type 2 diabetes mellitus with other specified complication, unspecified whether long term insulin use ([facility]    6. Primary hypertension    7. Other depression    8.  Hypothyroidism, unspecified type        ASSESSMENT/PLAN:    76-year-old woman here for perioperative risk assessment    1. Perioperative risk assessment for osteoarthritis of the left knee: Patient is scheduled for surgery on July 17. She has already had preoperative evaluation and clearance from her cardiologist and her nephrologist. Currently, labs and EKG are pending. We should have those results back on July 5. At this time, she denies any complaints of chest pain, chest pressure or heart palpitations. However, the patient does have a known history of DVT. She has a known history of hypercoagulability secondary to lupus anticoagulant. She also has a history of coat syndrome. She is very noncompliant with her medications. She will need to be monitored very closely in the postoperative setting for DVT and pulmonary embolism as she is going to be at very high risk for these 2 entities. At this time, she denies any complaints of chest pain, chest pressure or shortness of breath. Based on the limited information we have at this time, she appears to be medically optimized for surgical invention based on the limited information that we have. Final clearance will come after we get her labs and EKG back from [facility]. 2.  Type 2 diabetes: Her A1c is at goal    3. Hypertension: Blood pressure well controlled on current medication regimen    4. Depression: Relatively stable. Patient is having some difficulty with housing at this time. This has increased her anxiety level significantly    5. Hypothyroidism: Continue levothyroxine at current dose    Evern Nearing was seen today for surgical clearance.     Diagnoses and all orders for this visit:    Preop examination    Osteoarthritis of left knee, unspecified osteoarthritis type    History of DVT (deep vein thrombosis)    Lupus anticoagulant disorder (HCC)    Type 2 diabetes mellitus with other specified complication, unspecified whether long term insulin use ([facility]    Primary hypertension    Other depression    Hypothyroidism, unspecified type    Other orders  -     ferrous gluconate (FERGON) 240 (27 Fe) MG tablet; Take 1 tablet by mouth twice daily          No follow-ups on file. No orders of the defined types were placed in this encounter.       Richi Cortez MD

## 2022-07-03 ASSESSMENT — ENCOUNTER SYMPTOMS
DIARRHEA: 0
CHEST TIGHTNESS: 0
STRIDOR: 0
VOMITING: 0
TROUBLE SWALLOWING: 0
SINUS PRESSURE: 0
NAUSEA: 0
WHEEZING: 0
CONSTIPATION: 0
APNEA: 0
RHINORRHEA: 0
SINUS PAIN: 0
BACK PAIN: 1
COLOR CHANGE: 0
EYE DISCHARGE: 0
COUGH: 0
BLOOD IN STOOL: 0
SORE THROAT: 0
VOICE CHANGE: 0
SHORTNESS OF BREATH: 0
ABDOMINAL DISTENTION: 0
ABDOMINAL PAIN: 0
EYE ITCHING: 0

## 2022-07-04 LAB — INR BLD: 3

## 2022-07-05 ENCOUNTER — TELEPHONE (OUTPATIENT)
Dept: CARDIOLOGY | Facility: CLINIC | Age: 72
End: 2022-07-05

## 2022-07-05 LAB — INR BLD: 4.1

## 2022-07-05 NOTE — TELEPHONE ENCOUNTER
RECEIVED CALL FROM PALOMA GAFFNEY AT [facility] NEEDING SURGICAL CLEARANCE FOR TLKA FOR DR PATTERSON. PATIENT IS NOT DUE TO BE SEEN UNTIL 08/2022. LOV 08/2021. LAST ECHO,TASHAR,LOV NOTE HAVE BEEN FAXED. THERE IS NO SURGICAL CLEARANCE LETTER.

## 2022-07-06 DIAGNOSIS — K21.9 GASTROESOPHAGEAL REFLUX DISEASE WITHOUT ESOPHAGITIS: ICD-10-CM

## 2022-07-06 DIAGNOSIS — E78.5 HYPERLIPIDEMIA, UNSPECIFIED HYPERLIPIDEMIA TYPE: ICD-10-CM

## 2022-07-06 DIAGNOSIS — K52.9 GASTROENTERITIS: ICD-10-CM

## 2022-07-06 RX ORDER — CALCITRIOL 0.25 UG/1
CAPSULE, LIQUID FILLED ORAL
Qty: 90 CAPSULE | Refills: 0 | Status: SHIPPED | OUTPATIENT
Start: 2022-07-06

## 2022-07-06 RX ORDER — SUCRALFATE 1 G/1
TABLET ORAL
Qty: 360 TABLET | Refills: 0 | Status: SHIPPED | OUTPATIENT
Start: 2022-07-06

## 2022-07-06 RX ORDER — ROSUVASTATIN CALCIUM 5 MG/1
TABLET, COATED ORAL
Qty: 90 TABLET | Refills: 3 | Status: SHIPPED | OUTPATIENT
Start: 2022-07-06

## 2022-07-06 NOTE — TELEPHONE ENCOUNTER
Michael Zavala called to request a refill on her medication.       Last office visit : 6/28/2022   Next office visit : 8/2/2022     Requested Prescriptions     Pending Prescriptions Disp Refills    sucralfate (CARAFATE) 1 GM tablet [Pharmacy Med Name: SUCRALFATE 1 GM Tablet] 360 tablet 0     Sig: TAKE 1 TABLET FOUR TIMES DAILY    rosuvastatin (CRESTOR) 5 MG tablet [Pharmacy Med Name: ROSUVASTATIN CALCIUM 5 MG Tablet] 90 tablet      Sig: TAKE 1 TABLET EVERY DAY    calcitRIOL (ROCALTROL) 0.25 MCG capsule [Pharmacy Med Name: CALCITRIOL 0.25 MCG Capsule] 90 capsule 0     Sig: TAKE 1 CAPSULE EVERY DAY            Cecy Anthony

## 2022-07-08 ENCOUNTER — ANTI-COAG VISIT (OUTPATIENT)
Dept: INTERNAL MEDICINE | Age: 72
End: 2022-07-08
Payer: MEDICARE

## 2022-07-08 DIAGNOSIS — Z79.01 ANTICOAGULATED ON COUMADIN: Primary | ICD-10-CM

## 2022-07-08 PROCEDURE — 93793 ANTICOAG MGMT PT WARFARIN: CPT | Performed by: INTERNAL MEDICINE

## 2022-07-08 PROCEDURE — 99999 PR OFFICE/OUTPT VISIT,PROCEDURE ONLY: CPT | Performed by: INTERNAL MEDICINE

## 2022-07-08 NOTE — PROGRESS NOTES
Pt was notified, please sign. Electronically signed by Janes Springer MD on 7/8/2022 at 12:57 PM    I have reviewed nursing plan for Coumadin management and agree with plan.

## 2022-07-08 NOTE — PROGRESS NOTES
HOME MONITORING REPORT    INR today:   Results for orders placed or performed in visit on 07/08/22   Protime-INR   Result Value Ref Range    INR 4.1        INR Goal: 2.0-3.0    Dosing Plan  As of 7/8/2022    TTR:  27.4 % (4.1 y)   Full warfarin instructions:  7.5 mg every Mon, Wed, Fri; 15 mg all other days              PLAN: Hold coumadin for two days. Resume current dose on Sunday.  Check on Monday

## 2022-07-11 ENCOUNTER — NURSE ONLY (OUTPATIENT)
Dept: INTERNAL MEDICINE | Age: 72
End: 2022-07-11
Payer: MEDICARE

## 2022-07-11 ENCOUNTER — HOSPITAL ENCOUNTER (OUTPATIENT)
Dept: WOMENS IMAGING | Age: 72
Discharge: HOME | End: 2022-07-11
Payer: MEDICARE

## 2022-07-11 ENCOUNTER — ANTI-COAG VISIT (OUTPATIENT)
Dept: INTERNAL MEDICINE | Age: 72
End: 2022-07-11

## 2022-07-11 DIAGNOSIS — I10 ESSENTIAL HYPERTENSION: ICD-10-CM

## 2022-07-11 DIAGNOSIS — Z79.01 ANTICOAGULATED ON COUMADIN: Primary | ICD-10-CM

## 2022-07-11 DIAGNOSIS — E53.8 B12 DEFICIENCY: Primary | ICD-10-CM

## 2022-07-11 DIAGNOSIS — Z78.0 MENOPAUSE: ICD-10-CM

## 2022-07-11 DIAGNOSIS — Z12.31 ENCOUNTER FOR SCREENING MAMMOGRAM FOR MALIGNANT NEOPLASM OF BREAST: ICD-10-CM

## 2022-07-11 PROCEDURE — 77080 DXA BONE DENSITY AXIAL: CPT | Performed by: RADIOLOGY

## 2022-07-11 PROCEDURE — 77067 SCR MAMMO BI INCL CAD: CPT | Performed by: RADIOLOGY

## 2022-07-11 PROCEDURE — 77063 BREAST TOMOSYNTHESIS BI: CPT

## 2022-07-11 PROCEDURE — 99999 PR OFFICE/OUTPT VISIT,PROCEDURE ONLY: CPT | Performed by: INTERNAL MEDICINE

## 2022-07-11 PROCEDURE — 96372 THER/PROPH/DIAG INJ SC/IM: CPT | Performed by: INTERNAL MEDICINE

## 2022-07-11 PROCEDURE — 93793 ANTICOAG MGMT PT WARFARIN: CPT | Performed by: INTERNAL MEDICINE

## 2022-07-11 PROCEDURE — 77080 DXA BONE DENSITY AXIAL: CPT

## 2022-07-11 RX ORDER — BUMETANIDE 2 MG/1
TABLET ORAL
Qty: 90 TABLET | Refills: 3 | Status: ON HOLD | OUTPATIENT
Start: 2022-07-11 | End: 2022-08-18 | Stop reason: SDUPTHER

## 2022-07-11 RX ORDER — LISINOPRIL 40 MG/1
40 TABLET ORAL DAILY
Qty: 90 TABLET | Refills: 3 | Status: SHIPPED | OUTPATIENT
Start: 2022-07-11 | End: 2022-08-30 | Stop reason: ALTCHOICE

## 2022-07-11 RX ORDER — CYANOCOBALAMIN 1000 UG/ML
1000 INJECTION INTRAMUSCULAR; SUBCUTANEOUS ONCE
Status: COMPLETED | OUTPATIENT
Start: 2022-07-11 | End: 2022-07-11

## 2022-07-11 RX ADMIN — CYANOCOBALAMIN 1000 MCG: 1000 INJECTION INTRAMUSCULAR; SUBCUTANEOUS at 10:16

## 2022-07-11 NOTE — PROGRESS NOTES
HOME MONITORING REPORT    INR today:   Results for orders placed or performed in visit on 07/11/22   Protime-INR   Result Value Ref Range    INR 3.00        INR Goal: 2.0-3.0    Dosing Plan  As of 7/11/2022    TTR:  28.2 % (4.1 y)   Full warfarin instructions:  7.5 mg every Mon, Wed, Fri; 15 mg all other days              PLAN: Advised patient/caregiver to continue current dose and recheck in one week.   Patient/Caregiver voiced understanding

## 2022-07-11 NOTE — PROGRESS NOTES
After obtaining consent, and per orders of Dr. Nalini Guerra, injection of B12 given in Right deltoid by Carmen Lopez MA. Patient instructed to remain in clinic for 20 minutes afterwards, and to report any adverse reaction to me immediately.

## 2022-07-12 ENCOUNTER — TELEPHONE (OUTPATIENT)
Dept: CARDIOLOGY | Facility: CLINIC | Age: 72
End: 2022-07-12

## 2022-07-12 ENCOUNTER — TELEPHONE (OUTPATIENT)
Dept: NEUROLOGY | Facility: CLINIC | Age: 72
End: 2022-07-12

## 2022-07-12 NOTE — TELEPHONE ENCOUNTER
I spoke to Veronica and her daughter Rosa Isela. they both relay they are staying at their house with no running water and/or electricity. Daniel is to be using her CPAP, but is unable to due the living conditions. Rosa Isela relays they are looking for another place to live and that they are doing alright and not staying there all the time. I inquired about another family members home and if they were able to stay somewhere else. twyla said it wasn't that bad and that they would be alright. I advised them to call for anything and to let me know if we could help/assist with anything. Rosa Isela and Veronica said they would call if needed. LS

## 2022-07-12 NOTE — TELEPHONE ENCOUNTER
----- Message from Veronica Stewart sent at 7/11/2022  6:12 PM CDT -----  Regarding: Using my CPAP  machine   Due to the fact that my house was condemned on the 23rf of May and I am homeless, I can't ise my machine    We are having no luck at  finding a place to live

## 2022-07-12 NOTE — TELEPHONE ENCOUNTER
----- Message from Veronica REYNOLDSDouglas Stewart sent at 7/11/2022  6:04 PM CDT -----  Regarding: Knee surgery in [place], [place]   Dr. Lowe released me for knee surgery with Dr. Nugnet which was to take place in July     My family decided to have Dr. Judith alonso a second opinion and I decided to have it done in [place].  My surgery was not to be done in [place]

## 2022-07-13 LAB — INR BLD: 1.4

## 2022-07-14 ENCOUNTER — ANTI-COAG VISIT (OUTPATIENT)
Dept: INTERNAL MEDICINE | Age: 72
End: 2022-07-14

## 2022-07-14 DIAGNOSIS — Z79.01 ANTICOAGULATED ON COUMADIN: Primary | ICD-10-CM

## 2022-07-14 NOTE — TELEPHONE ENCOUNTER
FAXED SURGICAL CLEARANCE TO [facility] ORTHO COORDINATOR.  Rafat Anthony, CMA

## 2022-07-18 LAB — INR BLD: 1.9

## 2022-07-25 LAB — INR BLD: 3

## 2022-07-27 ENCOUNTER — ANTI-COAG VISIT (OUTPATIENT)
Dept: INTERNAL MEDICINE | Age: 72
End: 2022-07-27
Payer: MEDICARE

## 2022-07-27 DIAGNOSIS — Z79.01 ANTICOAGULATED ON COUMADIN: Primary | ICD-10-CM

## 2022-07-27 PROCEDURE — 93793 ANTICOAG MGMT PT WARFARIN: CPT | Performed by: INTERNAL MEDICINE

## 2022-07-27 PROCEDURE — 99999 PR OFFICE/OUTPT VISIT,PROCEDURE ONLY: CPT | Performed by: INTERNAL MEDICINE

## 2022-07-27 NOTE — PROGRESS NOTES
HOME MONITORING REPORT    INR today:   Results for orders placed or performed in visit on 07/27/22   Protime-INR   Result Value Ref Range    INR 1.90        INR Goal: 2.0-3.0    Dosing Plan  As of 7/27/2022      TTR:  27.6 % (4.2 y)   Full warfarin instructions:  7.5 mg every Mon, Wed, Fri; 15 mg all other days                PLAN: 15 Milligrams tonight. Resume current dose [place].   Check an INR in about a week

## 2022-07-28 NOTE — PROGRESS NOTES
Spoke to the pt this morning she VU to take 15mg today and then go back on current dose and check in a week.

## 2022-08-01 LAB — INR BLD: 2.9

## 2022-08-09 ENCOUNTER — ANTI-COAG VISIT (OUTPATIENT)
Dept: INTERNAL MEDICINE | Age: 72
End: 2022-08-09

## 2022-08-09 ENCOUNTER — TELEPHONE (OUTPATIENT)
Dept: INTERNAL MEDICINE | Age: 72
End: 2022-08-09

## 2022-08-09 DIAGNOSIS — Z79.01 ANTICOAGULATED ON COUMADIN: Primary | ICD-10-CM

## 2022-08-09 LAB — INR BLD: 7.3

## 2022-08-09 NOTE — PROGRESS NOTES
Pts INR is 7.3, her range is 2-3. Pt takes 15 mg of coumadin on Sunday, Tuesday, Thursday, and Saturday. She takes 7.5 mg of coumadin on Monday, Wednesday, and Friday. Please advise.

## 2022-08-10 ENCOUNTER — ANTI-COAG VISIT (OUTPATIENT)
Dept: INTERNAL MEDICINE | Age: 72
End: 2022-08-10
Payer: MEDICARE

## 2022-08-10 DIAGNOSIS — Z79.01 ANTICOAGULATED ON COUMADIN: Primary | ICD-10-CM

## 2022-08-10 PROCEDURE — 99999 PR OFFICE/OUTPT VISIT,PROCEDURE ONLY: CPT | Performed by: INTERNAL MEDICINE

## 2022-08-10 PROCEDURE — 93793 ANTICOAG MGMT PT WARFARIN: CPT | Performed by: INTERNAL MEDICINE

## 2022-08-10 NOTE — PROGRESS NOTES
Just received the INRs by fax today for DOS 08/01/2022, 07/25/2022, and 08/09/22. Both of the INRs where normal range and the INR from 8/9/22 was address from a call from [facility] that it was out of range.

## 2022-08-12 ENCOUNTER — ANTI-COAG VISIT (OUTPATIENT)
Dept: INTERNAL MEDICINE | Age: 72
End: 2022-08-12
Payer: MEDICARE

## 2022-08-12 DIAGNOSIS — Z79.01 ANTICOAGULATED ON COUMADIN: Primary | ICD-10-CM

## 2022-08-12 LAB — INR BLD: 4.8

## 2022-08-12 PROCEDURE — 93793 ANTICOAG MGMT PT WARFARIN: CPT | Performed by: INTERNAL MEDICINE

## 2022-08-12 PROCEDURE — 99999 PR OFFICE/OUTPT VISIT,PROCEDURE ONLY: CPT | Performed by: INTERNAL MEDICINE

## 2022-08-12 NOTE — PROGRESS NOTES
HOME MONITORING REPORT    INR today:   Results for orders placed or performed in visit on 08/12/22   Protime-INR   Result Value Ref Range    INR 4.80        INR Goal: 2.0-3.0    Dosing Plan  As of 8/12/2022      TTR:  28.0 % (4.2 y)   Full warfarin instructions:  8/12: Hold; 8/13: Hold; Otherwise 7.5 mg every Mon, Wed, Fri; 15 mg all other days                PLAN:     Blood Pressures are looking a little bit better. Continue to hold her Coumadin until Sunday. Resume current dose then. Have her check an INR on Tuesday.

## 2022-08-16 ENCOUNTER — NURSE TRIAGE (OUTPATIENT)
Dept: CALL CENTER | Facility: HOSPITAL | Age: 72
End: 2022-08-16

## 2022-08-16 ENCOUNTER — HOSPITAL ENCOUNTER (INPATIENT)
Age: 72
LOS: 1 days | Discharge: HOME | DRG: 684 | End: 2022-08-18
Attending: EMERGENCY MEDICINE | Admitting: HOSPITALIST
Payer: MEDICARE

## 2022-08-16 ENCOUNTER — APPOINTMENT (OUTPATIENT)
Dept: CT IMAGING | Age: 72
DRG: 684 | End: 2022-08-16
Payer: MEDICARE

## 2022-08-16 ENCOUNTER — APPOINTMENT (OUTPATIENT)
Dept: GENERAL RADIOLOGY | Age: 72
DRG: 684 | End: 2022-08-16
Payer: MEDICARE

## 2022-08-16 DIAGNOSIS — S80.11XA CONTUSION OF RIGHT LOWER EXTREMITY, INITIAL ENCOUNTER: ICD-10-CM

## 2022-08-16 DIAGNOSIS — N17.9 AKI (ACUTE KIDNEY INJURY) (HCC): ICD-10-CM

## 2022-08-16 DIAGNOSIS — I10 ESSENTIAL HYPERTENSION: ICD-10-CM

## 2022-08-16 DIAGNOSIS — D64.9 ANEMIA, UNSPECIFIED TYPE: Primary | ICD-10-CM

## 2022-08-16 DIAGNOSIS — R55 SYNCOPE AND COLLAPSE: ICD-10-CM

## 2022-08-16 DIAGNOSIS — R53.1 GENERAL WEAKNESS: ICD-10-CM

## 2022-08-16 LAB
ALBUMIN SERPL-MCNC: 3.8 G/DL (ref 3.5–5.2)
ALP BLD-CCNC: 98 U/L (ref 35–104)
ALT SERPL-CCNC: 11 U/L (ref 5–33)
ANION GAP SERPL CALCULATED.3IONS-SCNC: 13 MMOL/L (ref 7–19)
AST SERPL-CCNC: 21 U/L (ref 5–32)
BILIRUB SERPL-MCNC: 0.4 MG/DL (ref 0.2–1.2)
BUN BLDV-MCNC: 49 MG/DL (ref 8–23)
CALCIUM SERPL-MCNC: 9.4 MG/DL (ref 8.8–10.2)
CHLORIDE BLD-SCNC: 97 MMOL/L (ref 98–111)
CO2: 26 MMOL/L (ref 22–29)
CREAT SERPL-MCNC: 1.9 MG/DL (ref 0.5–0.9)
GFR AFRICAN AMERICAN: 31
GFR NON-AFRICAN AMERICAN: 26
GLUCOSE BLD-MCNC: 90 MG/DL (ref 74–109)
HCT VFR BLD CALC: 31.8 % (ref 37–47)
HEMOGLOBIN: 9.8 G/DL (ref 12–16)
INR BLD: 1.58 (ref 0.88–1.18)
LACTIC ACID: 1.6 MMOL/L (ref 0.5–1.9)
LIPASE: 90 U/L (ref 13–60)
MCH RBC QN AUTO: 28.8 PG (ref 27–31)
MCHC RBC AUTO-ENTMCNC: 30.8 G/DL (ref 33–37)
MCV RBC AUTO: 93.5 FL (ref 81–99)
PDW BLD-RTO: 14.5 % (ref 11.5–14.5)
PLATELET # BLD: 306 K/UL (ref 130–400)
PMV BLD AUTO: 9.7 FL (ref 9.4–12.3)
POTASSIUM REFLEX MAGNESIUM: 4.1 MMOL/L (ref 3.5–5)
PROTHROMBIN TIME: 19.1 SEC (ref 12–14.6)
RBC # BLD: 3.4 M/UL (ref 4.2–5.4)
SARS-COV-2, NAAT: NOT DETECTED
SODIUM BLD-SCNC: 136 MMOL/L (ref 136–145)
T4 FREE: 1.87 NG/DL (ref 0.93–1.7)
TOTAL PROTEIN: 8.4 G/DL (ref 6.6–8.7)
TROPONIN: <0.01 NG/ML (ref 0–0.03)
TSH REFLEX FT4: 0.14 UIU/ML (ref 0.35–5.5)
WBC # BLD: 8.8 K/UL (ref 4.8–10.8)

## 2022-08-16 PROCEDURE — 83605 ASSAY OF LACTIC ACID: CPT

## 2022-08-16 PROCEDURE — 86140 C-REACTIVE PROTEIN: CPT

## 2022-08-16 PROCEDURE — 84484 ASSAY OF TROPONIN QUANT: CPT

## 2022-08-16 PROCEDURE — 72170 X-RAY EXAM OF PELVIS: CPT | Performed by: RADIOLOGY

## 2022-08-16 PROCEDURE — 84443 ASSAY THYROID STIM HORMONE: CPT

## 2022-08-16 PROCEDURE — 85652 RBC SED RATE AUTOMATED: CPT

## 2022-08-16 PROCEDURE — 83880 ASSAY OF NATRIURETIC PEPTIDE: CPT

## 2022-08-16 PROCEDURE — 85610 PROTHROMBIN TIME: CPT

## 2022-08-16 PROCEDURE — 83690 ASSAY OF LIPASE: CPT

## 2022-08-16 PROCEDURE — 70450 CT HEAD/BRAIN W/O DYE: CPT

## 2022-08-16 PROCEDURE — 73552 X-RAY EXAM OF FEMUR 2/>: CPT

## 2022-08-16 PROCEDURE — 85027 COMPLETE CBC AUTOMATED: CPT

## 2022-08-16 PROCEDURE — 70450 CT HEAD/BRAIN W/O DYE: CPT | Performed by: RADIOLOGY

## 2022-08-16 PROCEDURE — 93005 ELECTROCARDIOGRAM TRACING: CPT | Performed by: EMERGENCY MEDICINE

## 2022-08-16 PROCEDURE — 84439 ASSAY OF FREE THYROXINE: CPT

## 2022-08-16 PROCEDURE — 80053 COMPREHEN METABOLIC PANEL: CPT

## 2022-08-16 PROCEDURE — 74176 CT ABD & PELVIS W/O CONTRAST: CPT

## 2022-08-16 PROCEDURE — 72170 X-RAY EXAM OF PELVIS: CPT

## 2022-08-16 PROCEDURE — 99285 EMERGENCY DEPT VISIT HI MDM: CPT

## 2022-08-16 PROCEDURE — 87635 SARS-COV-2 COVID-19 AMP PRB: CPT

## 2022-08-16 PROCEDURE — 73552 X-RAY EXAM OF FEMUR 2/>: CPT | Performed by: RADIOLOGY

## 2022-08-16 PROCEDURE — 36415 COLL VENOUS BLD VENIPUNCTURE: CPT

## 2022-08-16 RX ORDER — SODIUM CHLORIDE 9 MG/ML
INJECTION, SOLUTION INTRAVENOUS CONTINUOUS
Status: DISCONTINUED | OUTPATIENT
Start: 2022-08-16 | End: 2022-08-18

## 2022-08-16 ASSESSMENT — ENCOUNTER SYMPTOMS
ABDOMINAL PAIN: 1
DIARRHEA: 0
EYE PAIN: 0
BACK PAIN: 0
SHORTNESS OF BREATH: 0
VOMITING: 0

## 2022-08-16 NOTE — TELEPHONE ENCOUNTER
"Blood pressures have been 90s-100s systolically. Passed out 3-4 days ago. On blood thinnner. Hit leg on Mom's table and fell on her back. Family has been concerned. She is currently feeling lightheaded.     Reason for Disposition  • [1] Systolic BP  AND [2] taking blood pressure medications AND [3] dizzy, lightheaded or weak    Additional Information  • Negative: Started suddenly after an allergic medicine, an allergic food, or bee sting  • Negative: Shock suspected (e.g., cold/pale/clammy skin, too weak to stand, low BP, rapid pulse)  • Negative: Difficult to awaken or acting confused (e.g., disoriented, slurred speech)  • Negative: Fainted  • Negative: [1] Systolic BP < 90 AND [2] dizzy, lightheaded, or weak  • Negative: Chest pain  • Negative: Bleeding (e.g., vomiting blood, rectal bleeding or tarry stools, severe vaginal bleeding)(Exception: fainted from sight of small amount of blood; small cut or abrasion)  • Negative: Extra heart beats or heart is beating fast  (i.e., \"palpitations\")  • Negative: Sounds like a life-threatening emergency to the triager  • Negative: [1] Systolic BP < 80 AND [2] NOT dizzy, lightheaded or weak  • Negative: Abdominal pain  • Negative: Fever > 100.4 F (38.0 C)  • Negative: Major surgery in the past month  • Negative: [1] Drinking very little AND [2] dehydration suspected (e.g., no urine > 12 hours, very dry mouth, very lightheaded)  • Negative: [1] Fall in systolic BP > 20 mm Hg from normal AND [2] dizzy, lightheaded, or weak  • Negative: Patient sounds very sick or weak to the triager  • Negative: [1] Systolic BP < 90 AND [2] NOT dizzy, lightheaded or weak    Answer Assessment - Initial Assessment Questions  1. BLOOD PRESSURE: \"What is the blood pressure?\" \"Did you take at least two measurements 5 minutes apart?\"      107/76  110/65  2. ONSET: \"When did you take your blood pressure?\"      Prior to calling  3. HOW: \"How did you obtain the blood pressure?\" (e.g., visiting " "nurse, automatic home BP monitor)      Automatic cuff   4. HISTORY: \"Do you have a history of low blood pressure?\" \"What is your blood pressure normally?\"      Always have had high blood pressure and she was on medication. Told her to hold off last week.   5. MEDICATIONS: \"Are you taking any medications for blood pressure?\" If Yes, ask: \"Have they been changed recently?\"      Lisinopril  6. PULSE RATE: \"Do you know what your pulse rate is?\"       Feels okay. Has a pacer  7. OTHER SYMPTOMS: \"Have you been sick recently?\" \"Have you had a recent injury?\"      No. Ortho surgery on 7/19  8. PREGNANCY: \"Is there any chance you are pregnant?\" \"When was your last menstrual period?\"      No    Protocols used: BLOOD PRESSURE - LOW-ADULT-AH    "

## 2022-08-17 ENCOUNTER — APPOINTMENT (OUTPATIENT)
Dept: ULTRASOUND IMAGING | Age: 72
DRG: 684 | End: 2022-08-17
Payer: MEDICARE

## 2022-08-17 PROBLEM — N18.9 ACUTE KIDNEY INJURY SUPERIMPOSED ON CHRONIC KIDNEY DISEASE (HCC): Status: ACTIVE | Noted: 2022-08-17

## 2022-08-17 PROBLEM — N17.9 ACUTE KIDNEY INJURY SUPERIMPOSED ON CHRONIC KIDNEY DISEASE (HCC): Status: ACTIVE | Noted: 2022-08-17

## 2022-08-17 LAB
ANION GAP SERPL CALCULATED.3IONS-SCNC: 10 MMOL/L (ref 7–19)
BILIRUBIN URINE: NEGATIVE
BLOOD, URINE: NEGATIVE
BUN BLDV-MCNC: 46 MG/DL (ref 8–23)
C-REACTIVE PROTEIN: 0.67 MG/DL (ref 0–0.5)
CALCIUM SERPL-MCNC: 9 MG/DL (ref 8.8–10.2)
CHLORIDE BLD-SCNC: 105 MMOL/L (ref 98–111)
CLARITY: CLEAR
CO2: 26 MMOL/L (ref 22–29)
COLOR: YELLOW
CREAT SERPL-MCNC: 1.7 MG/DL (ref 0.5–0.9)
CREATININE URINE: 91.4 MG/DL (ref 4.2–622)
EKG P AXIS: 29 DEGREES
EKG P-R INTERVAL: 140 MS
EKG Q-T INTERVAL: 408 MS
EKG QRS DURATION: 120 MS
EKG QTC CALCULATION (BAZETT): 427 MS
EKG T AXIS: 102 DEGREES
EOSINOPHIL,URINE: NORMAL
GFR AFRICAN AMERICAN: 36
GFR NON-AFRICAN AMERICAN: 29
GLUCOSE BLD-MCNC: 97 MG/DL (ref 74–109)
GLUCOSE URINE: NEGATIVE MG/DL
KETONES, URINE: NEGATIVE MG/DL
LEUKOCYTE ESTERASE, URINE: NEGATIVE
NITRITE, URINE: NEGATIVE
OSMOLALITY URINE: 380 MOSM/KG (ref 250–1200)
PH UA: 6.5 (ref 5–8)
POTASSIUM REFLEX MAGNESIUM: 4.2 MMOL/L (ref 3.5–5)
PRO-BNP: 275 PG/ML (ref 0–900)
PROTEIN UA: NEGATIVE MG/DL
SEDIMENTATION RATE, ERYTHROCYTE: 72 MM/HR (ref 0–25)
SODIUM BLD-SCNC: 141 MMOL/L (ref 136–145)
SODIUM URINE: 68 MMOL/L
SPECIFIC GRAVITY UA: 1.01 (ref 1–1.03)
UROBILINOGEN, URINE: 1 E.U./DL

## 2022-08-17 PROCEDURE — 36415 COLL VENOUS BLD VENIPUNCTURE: CPT

## 2022-08-17 PROCEDURE — 83935 ASSAY OF URINE OSMOLALITY: CPT

## 2022-08-17 PROCEDURE — 6360000002 HC RX W HCPCS: Performed by: HOSPITALIST

## 2022-08-17 PROCEDURE — 80048 BASIC METABOLIC PNL TOTAL CA: CPT

## 2022-08-17 PROCEDURE — 97165 OT EVAL LOW COMPLEX 30 MIN: CPT

## 2022-08-17 PROCEDURE — 82570 ASSAY OF URINE CREATININE: CPT

## 2022-08-17 PROCEDURE — 81003 URINALYSIS AUTO W/O SCOPE: CPT

## 2022-08-17 PROCEDURE — 1210000000 HC MED SURG R&B

## 2022-08-17 PROCEDURE — 94640 AIRWAY INHALATION TREATMENT: CPT

## 2022-08-17 PROCEDURE — 76770 US EXAM ABDO BACK WALL COMP: CPT

## 2022-08-17 PROCEDURE — 84300 ASSAY OF URINE SODIUM: CPT

## 2022-08-17 PROCEDURE — 2580000003 HC RX 258: Performed by: EMERGENCY MEDICINE

## 2022-08-17 PROCEDURE — 97161 PT EVAL LOW COMPLEX 20 MIN: CPT

## 2022-08-17 PROCEDURE — 76770 US EXAM ABDO BACK WALL COMP: CPT | Performed by: RADIOLOGY

## 2022-08-17 PROCEDURE — 6370000000 HC RX 637 (ALT 250 FOR IP): Performed by: HOSPITALIST

## 2022-08-17 PROCEDURE — 87205 SMEAR GRAM STAIN: CPT

## 2022-08-17 PROCEDURE — 2580000003 HC RX 258: Performed by: HOSPITALIST

## 2022-08-17 PROCEDURE — 97116 GAIT TRAINING THERAPY: CPT

## 2022-08-17 PROCEDURE — 93010 ELECTROCARDIOGRAM REPORT: CPT | Performed by: INTERNAL MEDICINE

## 2022-08-17 RX ORDER — POTASSIUM CHLORIDE 750 MG/1
10 TABLET, EXTENDED RELEASE ORAL DAILY
Status: DISCONTINUED | OUTPATIENT
Start: 2022-08-17 | End: 2022-08-18 | Stop reason: HOSPADM

## 2022-08-17 RX ORDER — SUCRALFATE 1 G/1
1 TABLET ORAL EVERY 6 HOURS SCHEDULED
Status: DISCONTINUED | OUTPATIENT
Start: 2022-08-17 | End: 2022-08-18 | Stop reason: HOSPADM

## 2022-08-17 RX ORDER — POLYETHYLENE GLYCOL 3350 17 G/17G
17 POWDER, FOR SOLUTION ORAL DAILY PRN
Status: DISCONTINUED | OUTPATIENT
Start: 2022-08-17 | End: 2022-08-18 | Stop reason: HOSPADM

## 2022-08-17 RX ORDER — BACLOFEN 10 MG/1
10 TABLET ORAL 3 TIMES DAILY PRN
Status: DISCONTINUED | OUTPATIENT
Start: 2022-08-17 | End: 2022-08-18 | Stop reason: HOSPADM

## 2022-08-17 RX ORDER — ACETAMINOPHEN 650 MG/1
650 SUPPOSITORY RECTAL EVERY 6 HOURS PRN
Status: DISCONTINUED | OUTPATIENT
Start: 2022-08-17 | End: 2022-08-18 | Stop reason: HOSPADM

## 2022-08-17 RX ORDER — CALCITRIOL 0.25 UG/1
0.25 CAPSULE, LIQUID FILLED ORAL DAILY
Status: DISCONTINUED | OUTPATIENT
Start: 2022-08-17 | End: 2022-08-18 | Stop reason: HOSPADM

## 2022-08-17 RX ORDER — SODIUM CHLORIDE 0.9 % (FLUSH) 0.9 %
5-40 SYRINGE (ML) INJECTION PRN
Status: DISCONTINUED | OUTPATIENT
Start: 2022-08-17 | End: 2022-08-18 | Stop reason: HOSPADM

## 2022-08-17 RX ORDER — LEVOTHYROXINE SODIUM 0.05 MG/1
100 TABLET ORAL DAILY
Status: DISCONTINUED | OUTPATIENT
Start: 2022-08-17 | End: 2022-08-17

## 2022-08-17 RX ORDER — SODIUM CHLORIDE 9 MG/ML
INJECTION, SOLUTION INTRAVENOUS PRN
Status: DISCONTINUED | OUTPATIENT
Start: 2022-08-17 | End: 2022-08-18 | Stop reason: HOSPADM

## 2022-08-17 RX ORDER — CALCIUM CARBONATE 200(500)MG
500 TABLET,CHEWABLE ORAL 3 TIMES DAILY PRN
Status: DISCONTINUED | OUTPATIENT
Start: 2022-08-17 | End: 2022-08-18 | Stop reason: HOSPADM

## 2022-08-17 RX ORDER — ONDANSETRON 2 MG/ML
4 INJECTION INTRAMUSCULAR; INTRAVENOUS EVERY 6 HOURS PRN
Status: DISCONTINUED | OUTPATIENT
Start: 2022-08-17 | End: 2022-08-18 | Stop reason: HOSPADM

## 2022-08-17 RX ORDER — SODIUM CHLORIDE 0.9 % (FLUSH) 0.9 %
5-40 SYRINGE (ML) INJECTION EVERY 12 HOURS SCHEDULED
Status: DISCONTINUED | OUTPATIENT
Start: 2022-08-17 | End: 2022-08-18 | Stop reason: HOSPADM

## 2022-08-17 RX ORDER — GABAPENTIN 300 MG/1
300 CAPSULE ORAL 3 TIMES DAILY
Status: DISCONTINUED | OUTPATIENT
Start: 2022-08-17 | End: 2022-08-18 | Stop reason: HOSPADM

## 2022-08-17 RX ORDER — DIAPER,BRIEF,INFANT-TODD,DISP
EACH MISCELLANEOUS 2 TIMES DAILY
Status: DISCONTINUED | OUTPATIENT
Start: 2022-08-17 | End: 2022-08-18 | Stop reason: HOSPADM

## 2022-08-17 RX ORDER — FERROUS GLUCONATE 324(37.5)
324 TABLET ORAL 2 TIMES DAILY WITH MEALS
Status: DISCONTINUED | OUTPATIENT
Start: 2022-08-17 | End: 2022-08-18 | Stop reason: HOSPADM

## 2022-08-17 RX ORDER — MONTELUKAST SODIUM 10 MG/1
10 TABLET ORAL DAILY
Status: DISCONTINUED | OUTPATIENT
Start: 2022-08-17 | End: 2022-08-18 | Stop reason: HOSPADM

## 2022-08-17 RX ORDER — ONDANSETRON 4 MG/1
4 TABLET, ORALLY DISINTEGRATING ORAL EVERY 8 HOURS PRN
Status: DISCONTINUED | OUTPATIENT
Start: 2022-08-17 | End: 2022-08-18 | Stop reason: HOSPADM

## 2022-08-17 RX ORDER — ESCITALOPRAM OXALATE 10 MG/1
20 TABLET ORAL DAILY
Status: DISCONTINUED | OUTPATIENT
Start: 2022-08-17 | End: 2022-08-18 | Stop reason: HOSPADM

## 2022-08-17 RX ORDER — LEVOTHYROXINE SODIUM 0.05 MG/1
75 TABLET ORAL DAILY
Status: DISCONTINUED | OUTPATIENT
Start: 2022-08-18 | End: 2022-08-18 | Stop reason: HOSPADM

## 2022-08-17 RX ORDER — ROSUVASTATIN CALCIUM 10 MG/1
5 TABLET, COATED ORAL DAILY
Status: DISCONTINUED | OUTPATIENT
Start: 2022-08-17 | End: 2022-08-18 | Stop reason: HOSPADM

## 2022-08-17 RX ORDER — PANTOPRAZOLE SODIUM 40 MG/1
40 TABLET, DELAYED RELEASE ORAL 2 TIMES DAILY
Status: DISCONTINUED | OUTPATIENT
Start: 2022-08-17 | End: 2022-08-18 | Stop reason: HOSPADM

## 2022-08-17 RX ORDER — HEPARIN SODIUM 5000 [USP'U]/ML
5000 INJECTION, SOLUTION INTRAVENOUS; SUBCUTANEOUS EVERY 8 HOURS SCHEDULED
Status: DISCONTINUED | OUTPATIENT
Start: 2022-08-17 | End: 2022-08-18 | Stop reason: HOSPADM

## 2022-08-17 RX ORDER — MECOBALAMIN 5000 MCG
5 TABLET,DISINTEGRATING ORAL NIGHTLY PRN
Status: DISCONTINUED | OUTPATIENT
Start: 2022-08-17 | End: 2022-08-18 | Stop reason: HOSPADM

## 2022-08-17 RX ORDER — SODIUM CHLORIDE 9 MG/ML
INJECTION, SOLUTION INTRAVENOUS CONTINUOUS
Status: DISCONTINUED | OUTPATIENT
Start: 2022-08-17 | End: 2022-08-17

## 2022-08-17 RX ORDER — M-VIT,TX,IRON,MINS/CALC/FOLIC 27MG-0.4MG
1 TABLET ORAL DAILY
Status: DISCONTINUED | OUTPATIENT
Start: 2022-08-17 | End: 2022-08-18 | Stop reason: HOSPADM

## 2022-08-17 RX ORDER — ACETAMINOPHEN 325 MG/1
650 TABLET ORAL EVERY 6 HOURS PRN
Status: DISCONTINUED | OUTPATIENT
Start: 2022-08-17 | End: 2022-08-18 | Stop reason: HOSPADM

## 2022-08-17 RX ADMIN — GABAPENTIN 300 MG: 300 CAPSULE ORAL at 13:44

## 2022-08-17 RX ADMIN — PANTOPRAZOLE SODIUM 40 MG: 40 TABLET, DELAYED RELEASE ORAL at 09:07

## 2022-08-17 RX ADMIN — Medication 324 MG: at 17:35

## 2022-08-17 RX ADMIN — CALCITRIOL CAPSULES 0.25 MCG 0.25 MCG: 0.25 CAPSULE ORAL at 09:07

## 2022-08-17 RX ADMIN — MULTIPLE VITAMINS W/ MINERALS TAB 1 TABLET: TAB at 09:07

## 2022-08-17 RX ADMIN — IPRATROPIUM BROMIDE 0.5 MG: 0.5 SOLUTION RESPIRATORY (INHALATION) at 19:02

## 2022-08-17 RX ADMIN — SODIUM CHLORIDE: 9 INJECTION, SOLUTION INTRAVENOUS at 00:13

## 2022-08-17 RX ADMIN — SODIUM CHLORIDE: 9 INJECTION, SOLUTION INTRAVENOUS at 21:41

## 2022-08-17 RX ADMIN — SUCRALFATE 1 G: 1 TABLET ORAL at 11:51

## 2022-08-17 RX ADMIN — GABAPENTIN 300 MG: 300 CAPSULE ORAL at 09:07

## 2022-08-17 RX ADMIN — IPRATROPIUM BROMIDE 0.5 MG: 0.5 SOLUTION RESPIRATORY (INHALATION) at 16:16

## 2022-08-17 RX ADMIN — SODIUM CHLORIDE: 9 INJECTION, SOLUTION INTRAVENOUS at 11:57

## 2022-08-17 RX ADMIN — POTASSIUM CHLORIDE 10 MEQ: 750 TABLET, EXTENDED RELEASE ORAL at 09:07

## 2022-08-17 RX ADMIN — SUCRALFATE 1 G: 1 TABLET ORAL at 17:35

## 2022-08-17 RX ADMIN — ROSUVASTATIN CALCIUM 5 MG: 10 TABLET, FILM COATED ORAL at 09:07

## 2022-08-17 RX ADMIN — SUCRALFATE 1 G: 1 TABLET ORAL at 06:28

## 2022-08-17 RX ADMIN — HEPARIN SODIUM 5000 UNITS: 5000 INJECTION INTRAVENOUS; SUBCUTANEOUS at 21:42

## 2022-08-17 RX ADMIN — HEPARIN SODIUM 5000 UNITS: 5000 INJECTION INTRAVENOUS; SUBCUTANEOUS at 13:44

## 2022-08-17 RX ADMIN — MONTELUKAST 10 MG: 10 TABLET, FILM COATED ORAL at 09:07

## 2022-08-17 RX ADMIN — ESCITALOPRAM OXALATE 20 MG: 10 TABLET ORAL at 09:07

## 2022-08-17 RX ADMIN — LEVOTHYROXINE SODIUM 100 MCG: 50 TABLET ORAL at 06:27

## 2022-08-17 RX ADMIN — HEPARIN SODIUM 5000 UNITS: 5000 INJECTION INTRAVENOUS; SUBCUTANEOUS at 06:28

## 2022-08-17 RX ADMIN — ACETAMINOPHEN 650 MG: 325 TABLET, FILM COATED ORAL at 06:27

## 2022-08-17 RX ADMIN — IPRATROPIUM BROMIDE 0.5 MG: 0.5 SOLUTION RESPIRATORY (INHALATION) at 07:40

## 2022-08-17 RX ADMIN — Medication 10 ML: at 09:07

## 2022-08-17 RX ADMIN — PANTOPRAZOLE SODIUM 40 MG: 40 TABLET, DELAYED RELEASE ORAL at 21:42

## 2022-08-17 RX ADMIN — GABAPENTIN 300 MG: 300 CAPSULE ORAL at 21:41

## 2022-08-17 RX ADMIN — IPRATROPIUM BROMIDE 0.5 MG: 0.5 SOLUTION RESPIRATORY (INHALATION) at 11:39

## 2022-08-17 RX ADMIN — Medication 324 MG: at 09:09

## 2022-08-17 ASSESSMENT — ENCOUNTER SYMPTOMS
ABDOMINAL PAIN: 0
SINUS PAIN: 0
NAUSEA: 0
SHORTNESS OF BREATH: 1
CONSTIPATION: 1
COUGH: 0
DIARRHEA: 0
CHEST TIGHTNESS: 0
CONSTIPATION: 0
EYE DISCHARGE: 0

## 2022-08-17 ASSESSMENT — PAIN DESCRIPTION - ORIENTATION: ORIENTATION: LEFT

## 2022-08-17 ASSESSMENT — PAIN SCALES - GENERAL
PAINLEVEL_OUTOF10: 7
PAINLEVEL_OUTOF10: 0

## 2022-08-17 ASSESSMENT — PAIN DESCRIPTION - LOCATION: LOCATION: LEG

## 2022-08-17 NOTE — PROGRESS NOTES
Comprehensive Nutrition Assessment    Type and Reason for Visit:  Initial, Positive Nutrition Screen    Nutrition Recommendations/Plan:   Follow for po intake, starting ONS as needed     Malnutrition Assessment:  Malnutrition Status:  Severe malnutrition (08/17/22 1243)    Context:  Acute Illness     Findings of the 6 clinical characteristics of malnutrition:  Energy Intake:  Mild decrease in energy intake (Comment)  Weight Loss:  Greater than 7.5% over 3 months     Body Fat Loss:  No significant body fat loss     Muscle Mass Loss:  No significant muscle mass loss    Fluid Accumulation:  Moderate to Severe Extremities   Strength:  Not Performed    Nutrition Assessment:    +NS for decreased weight and po intake. Pt does remain adequatelynourished, but has had a 9.1% decrease of UBW in the past 3 months. PO intake 25-50%. at this time    Nutrition Related Findings:    hx-gastric bypass Wound Type: Surgical Incision       Current Nutrition Intake & Therapies:    Average Meal Intake: 26-50%  Average Supplements Intake: None Ordered  ADULT DIET; Regular; 4 carb choices (60 gm/meal); Low Fat/Low Chol/High Fiber/2 gm Na; Low Sodium (2 gm); Low Potassium (Less than 3000 mg/day); Low Phosphorus (Less than 1000 mg)    Anthropometric Measures:  Height: 5' 7\" (170.2 cm)  Ideal Body Weight (IBW): 135 lbs (61 kg)    Admission Body Weight: 243 lb (110.2 kg)  Current Body Weight: 221 lb (100.2 kg), 163.7 % IBW. Weight Source: Bed Scale  Current BMI (kg/m2): 34.6  Usual Body Weight: 243 lb (110.2 kg) (5/2022)  % Weight Change (Calculated): -9.1     BMI Categories: Obese Class 1 (BMI 30.0-34. 9)    Estimated Daily Nutrient Needs:  Energy Requirements Based On: Kcal/kg  Weight Used for Energy Requirements: Current  Energy (kcal/day): 2940-1651 kcals (15-18 kcals/kg)  Weight Used for Protein Requirements: Ideal  Protein (g/day): 74-123g  Method Used for Fluid Requirements: 1 ml/kcal  Fluid (ml/day): 2249-9392 ml    Nutrition Diagnosis:   Unintended weight loss, Inadequate oral intake related to increase demand for energy/nutrients as evidenced by intake 26-50%, weight loss 7.5% in 3 months, poor intake prior to admission, wounds    Nutrition Interventions:   Food and/or Nutrient Delivery: Continue Current Diet  Nutrition Education/Counseling: No recommendation at this time  Coordination of Nutrition Care: Continue to monitor while inpatient       Goals:     Goals: PO intake 50% or greater, Meet at least 75% of estimated needs       Nutrition Monitoring and Evaluation:   Behavioral-Environmental Outcomes: None Identified  Food/Nutrient Intake Outcomes: Food and Nutrient Intake  Physical Signs/Symptoms Outcomes: Biochemical Data, Weight, Skin, Fluid Status or Edema    Discharge Planning:    Continue current diet     Endy Starks, MS, RD, LD  Contact: [phone]

## 2022-08-17 NOTE — PROGRESS NOTES
Sciatica, Secondary hyperparathyroidism ([facility] [address] Shingles, Shortness of breath, Sleep apnea, Stomach ulcer, Syncope, and Visual loss, one eye. Past Surgical History:  has a past surgical history that includes Pacemaker insertion; Gastric bypass surgery; hernia repair; Cholecystectomy; Splenectomy; eye surgery; Colonoscopy (02/2010); Upper gastrointestinal endoscopy (12/2011); Appendectomy; Tonsillectomy and adenoidectomy; [place] Incontinence surgery; Upper gastrointestinal endoscopy (02/2014); Upper gastrointestinal endoscopy (02/2010); Upper gastrointestinal endoscopy (07/2006); Colonoscopy (02/22/2010); Upper gastrointestinal endoscopy (08/10/2015); Colonoscopy (04/01/2016); Upper gastrointestinal endoscopy (N/A, 04/01/2016); other surgical history; Small intestine surgery; pacemaker placement; Vena Cava Filter Placement (Right, 2003); Eye surgery; Gastric bypass surgery; Small intestine surgery; pr total knee arthroplasty (Right, 03/26/2018); Dilatation, esophagus; Total knee arthroplasty (Left, 07/19/2022); Infected skin debridement (Right); Upper gastrointestinal endoscopy (N/A, 05/07/2021); Colonoscopy (N/A, 05/07/2021); Hysterectomy (1974); and Total abdominal hysterectomy w/ bilateral salpingoophorectomy (Bilateral, 1974). Treatment Diagnosis: Acute kidney injury , hypotension      Assessment   Performance deficits / Impairments: Decreased functional mobility ; Decreased endurance;Decreased ADL status  Assessment: Pt. doing well with ADLs and mobility with walker.   OT eval only  Treatment Diagnosis: Acute kidney injury , hypotension  Prognosis: Good  Decision Making: Low Complexity  REQUIRES OT FOLLOW-UP: No  Activity Tolerance  Activity Tolerance: Patient Tolerated treatment well        Plan   Plan  Times per Week: OT eval only     Restrictions       Subjective   General  Chart Reviewed: Yes  Patient assessed for rehabilitation services?: Yes  Additional Pertinent Hx: L TKR approx 6 weeks ago  Family / Caregiver Present: No  Diagnosis: Acute kidney injury, hypotension  General Comment  Comments: Pt. states she has been up walking with her granddaughter today     Social/Functional History  Social/Functional History  Lives With: Family (Living currently with her mother)  ADL Assistance: Independent  Ambulation Assistance: Independent  Transfer Assistance: Independent       Objective   Heart Rate: 67  Heart Rate Source: Monitor  BP: 106/75  BP Location: Left upper arm  Patient Position: Sitting  MAP (Calculated): 85.33  Resp: 18  SpO2: 100 %  O2 Device: None (Room air)                      AROM: Within functional limits  Strength:  Within functional limits  Coordination: Within functional limits  ADL  Feeding: Independent  Grooming: Independent  UE Bathing: Supervision  LE Bathing: Supervision  UE Dressing: Supervision  LE Dressing: Supervision  Toileting: Supervision           Transfers  Stand Step Transfers: Supervision  Sit to stand: Supervision  Vision  Vision: Within Functional Limits  Hearing  Hearing: Within functional limits  Cognition  Overall Cognitive Status: WFL  Orientation  Overall Orientation Status: Within Functional Limits                                           G-Code     OutComes Score                                                  AM-PAC Score             [place] Score       Goals  Short Term Goals  Time Frame for Short term goals: OT eval only  Long Term Goals  Time Frame for Long term goals : OT eval only       Therapy Time   Individual Concurrent Group Co-treatment   Time In           Time Out           Minutes                   Tahira Perez OT

## 2022-08-17 NOTE — ED PROVIDER NOTES
[facility] 3 RAMON/VAS/MED  eMERGENCY dEPARTMENT eNCOUnter      Pt Name: Elaine Carrizales  MRN: [id number]  [place] 1950  Date of evaluation: 8/16/2022  Provider: Leonor Kathleen MD    CHIEF COMPLAINT       Chief Complaint   Patient presents with    Hypotension     Pt C/O hypotension, weakness and dizziness x 4 days. Family reports that she had syncopal episode 3 days ago. Was told to come to ER several days ago by PCP. Pt has been off HTN meds and blood pressure meds since symptoms started. HISTORY OF PRESENT ILLNESS   (Location/Symptom, Timing/Onset,Context/Setting, Quality, Duration, Modifying Factors, Severity)  Note limiting factors. Elaine Carrizales is a 67 y.o. female who presents to the emergency department complaining of generalized weakness and hypotension. Patient said that for the past 4 5 days she has felt lightheaded and weak and felt off balance when she walks. 1 episode where she passed out. Hit her right thigh when she did this. Denies any other injuries. Patient said her blood pressures been running low. Lowest has been in systolic pressure in the 98R. Since the symptoms started she stopped taking her blood pressure medication and contacted her doctor but has not been able to see her doctor yet. Basically just feels fatigued. No nausea vomiting diarrhea. No change in urine output. Does have a history of chronic kidney disease. Has had some mild pain in her left abdomen since symptoms started. No nausea vomiting or diarrhea. No black or bloody stools. No urinary complaints. No numbness. No weakness. No new vision issues. No headache or neck pain or back pain. Had left knee replacement done in [place] about 6 weeks ago. Left knee has been gradually improving since. No new or worsening pain. No swelling to the legs.   Does have a wound inferior to this from her surgery that is covered with Steri-Strips and has been using a little bit of drainage for the past couple of weeks but nothing has been any worse. No new pain. No fevers chills or other constitutional symptoms. No redness or warmth to the wounds. 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 Has also been followed by ophthalmology recently due to changes in her vision that started several weeks ago in her left eye. This is not new and she has been seen by ophthalmology for this and told that she has an issue with her left retina for which she is seeing a retinal specialist.  Was also told recently that her INR was supratherapeutic and told to hold her Coumadin. No new medications. HPI    NursingNotes were reviewed. REVIEW OF SYSTEMS    (2-9 systems for level 4, 10 or more for level 5)     Review of Systems   Constitutional:  Positive for fatigue. Negative for fever. Eyes:  Positive for visual disturbance (chronically. no recent changes). Negative for pain. Respiratory:  Negative for shortness of breath. Cardiovascular:  Negative for chest pain and palpitations. Gastrointestinal:  Positive for abdominal pain. Negative for diarrhea and vomiting. Genitourinary:  Negative for difficulty urinating and dysuria. Musculoskeletal:  Negative for back pain and neck pain. Skin:  Negative for rash. Neurological:  Positive for dizziness, syncope and light-headedness. Negative for facial asymmetry, speech difficulty, weakness, numbness and headaches. All other systems reviewed and are negative. A complete review of systems was performed and is negative except as noted above in the HPI.        PAST MEDICAL HISTORY     Past Medical History:   Diagnosis Date    Abdominal pain     Abnormal EKG     Acute sinusitis     Acute superficial venous thrombosis of left lower extremity     Allergic reaction to spider bite     Anemia     Anticoagulated     Anxiety     Arrhythmia     Asthmatic bronchitis without complication 8/84/5411    Ataxic gait     Lyons's esophagus     Bowel obstruction (HCC)     Burn of abdomen wall, second degree, initial encounter 8/27/2018    Callus     Cardiac pacemaker     Cerebral artery occlusion with cerebral infarction [facility])     CKD (chronic kidney disease) stage 2, GFR 60-89 ml/min     Coat's syndrome     Coat's syndrome     both eyes    COPD (chronic obstructive pulmonary disease) (HCC)     Depression     Diabetes mellitus type 2 in nonobese (HCC)     Diabetic nephropathy (HCC)     Disequilibrium     Dizziness     DVT (deep venous thrombosis) (HCC)     Exudative retinopathy     Fibromyalgia     Fibromyositis     Gastric ulcer     GERD (gastroesophageal reflux disease)     History of gastric bypass     Hx of blood clots     Hx of lupus anticoagulant disorder     Hyperlipidemia 5/6/2019    Hypertension     Hypothyroidism     Intermittent claudication (HCC)     Intestinal obstruction (HCC)     Iron deficiency     Left-sided weakness     Low vitamin D level     Lupus (HCC)     Menopause     Obesity     Osteoarthritis     Osteoporosis     Other iron deficiency anemias     Palliative care patient 09/24/2020    Pernicious anemia     PONV (postoperative nausea and vomiting)     PUPP (pruritic urticarial papules and plaques of pregnancy)     Restless legs syndrome (RLS) 7/11/2019    Right leg numbness     Right sided sciatica     Sarcoidosis     with liver involvement    Sciatica     Secondary hyperparathyroidism ([address]     Shingles     Shortness of breath     Sleep apnea     Stomach ulcer     Syncope     Visual loss, one eye          SURGICAL HISTORY       Past Surgical History:   Procedure Laterality Date    APPENDECTOMY      CARDIAC CATHETERIZATION  10/21/13  [facility]    EF over 60%    CHOLECYSTECTOMY      COLONOSCOPY  02/2010    negative    COLONOSCOPY  02/22/2010    Dr Saldana Ear    COLONOSCOPY  04/01/2016    Dr LAKHWINDER Horvath-internal hemorrhoids, 5 yr recall    COLONOSCOPY N/A 05/07/2021    Dr Yoan Horvath-Significant looping/tortuosity throughout the left colon, BE=Normal    DILATATION, ESOPHAGUS      EYE SURGERY      Cyst on Right eye    EYE SURGERY      GASTRIC BYPASS SURGERY      GASTRIC BYPASS SURGERY      HERNIA REPAIR      HYSTERECTOMY (CERVIX STATUS UNKNOWN)  1974    Complete    INCONTINENCE SURGERY      Bladder Sling    INFECTED SKIN DEBRIDEMENT Right     dog bite right forearm    OTHER SURGICAL HISTORY      IVC filter    PACEMAKER INSERTION      PACEMAKER PLACEMENT      medtronic    MT TOTAL KNEE ARTHROPLASTY Right 03/26/2018    TOTAL KNEE REPLACEMENT COMPLEX PRIMARY performed by William Bennett MD at [facility]      KRISTEL AND BSO (CERVIX REMOVED) Bilateral 1974    age 21    TONSILLECTOMY AND ADENOIDECTOMY      TOTAL KNEE ARTHROPLASTY      UPPER GASTROINTESTINAL ENDOSCOPY  12/2011    gerd s/p gastric bypass    UPPER GASTROINTESTINAL ENDOSCOPY  02/2014    normal s/p gastric bypass    UPPER GASTROINTESTINAL ENDOSCOPY  02/2010    biopsy neg Barretts, chronic reflux esophagitis s/p gastric bypass    UPPER GASTROINTESTINAL ENDOSCOPY  07/2006    unremarkable s/p gastric bypass    UPPER GASTROINTESTINAL ENDOSCOPY  08/10/2015    Dr Stefani Ceja ENDOSCOPY N/A 04/01/2016    Dr. Margo Garcias:  H Pylori(-), HH, o/w normal    UPPER GASTROINTESTINAL ENDOSCOPY N/A 05/07/2021    Dr Catarina Brennan hiatal hernia, previous gastric bypass surgery, gastritis    VENA CAVA FILTER PLACEMENT Right 2003         CURRENT MEDICATIONS       Current Discharge Medication List        CONTINUE these medications which have NOT CHANGED    Details   lisinopril (PRINIVIL;ZESTRIL) 40 MG tablet Take 1 tablet by mouth daily Take 1 tablet by mouth once daily  Qty: 90 tablet, Refills: 3    Associated Diagnoses: Essential hypertension      bumetanide (BUMEX) 2 MG tablet TAKE 1 TABLET EVERY DAY  Qty: 90 tablet, Refills: 3    Associated Diagnoses: Essential hypertension      sucralfate (CARAFATE) 1 GM tablet TAKE 1 TABLET FOUR TIMES DAILY  Qty: 360 tablet, Refills: 0    Associated Diagnoses: Gastroesophageal reflux disease without esophagitis; Gastroenteritis      rosuvastatin (CRESTOR) 5 MG tablet TAKE 1 TABLET EVERY DAY  Qty: 90 tablet, Refills: 3    Associated Diagnoses: Hyperlipidemia, unspecified hyperlipidemia type      calcitRIOL (ROCALTROL) 0.25 MCG capsule TAKE 1 CAPSULE EVERY DAY  Qty: 90 capsule, Refills: 0      ferrous gluconate (FERGON) 240 (27 Fe) MG tablet Take 1 tablet by mouth twice daily  Qty: 180 tablet, Refills: 0      fexofenadine (ALLEGRA) 180 MG tablet Take 1 tablet by mouth once daily  Qty: 90 tablet, Refills: 0      Alcohol Swabs (B-D SINGLE USE SWABS REGULAR) PADS TID E11.21  Qty: 300 each, Refills: 3      Blood Glucose Calibration (TRUE METRIX LEVEL 1) Low SOLN TID E11.21  Qty: 3 each, Refills: 3      montelukast (SINGULAIR) 10 MG tablet Take 1 tablet by mouth daily  Qty: 90 tablet, Refills: 3      Hydrocortisone, Perianal, (PROCTO-CASPER) 1 % cream Apply topically 2 times daily.   Qty: 1 each, Refills: 1      gabapentin (NEURONTIN) 300 MG capsule TAKE 1 CAPSULE BY MOUTH THREE TIMES DAILY  Qty: 270 capsule, Refills: 0    Comments: Do not fill until due per susu  Associated Diagnoses: Neuropathy      Semaglutide,0.25 or 0.5MG/DOS, 2 MG/1.5ML SOPN Inject 0.25 mg into the skin once a week  Qty: 3 pen, Refills: 1      ondansetron (ZOFRAN) 4 MG tablet Take 1 tablet by mouth every 8 hours as needed for Nausea  Qty: 30 tablet, Refills: 1    Associated Diagnoses: Gastroenteritis      baclofen (LIORESAL) 10 MG tablet Take 1 tablet by mouth 3 times daily As needed for hip pain  Qty: 30 tablet, Refills: 1      Cyanocobalamin 1000 MCG/ML KIT Inject 1 mL as directed every 30 days  Qty: 3 kit, Refills: 1      calcipotriene (DOVONEX) 0.005 % cream Use topically as needed  Qty: 3 each, Refills: 1      escitalopram (LEXAPRO) 20 MG tablet Take 1 tablet by mouth daily  Qty: 90 tablet, Refills: 3      tiotropium (SPIRIVA RESPIMAT) 2.5 MCG/ACT AERS inhaler Inhale 2 puffs into the lungs daily  Qty: 3 each, Refills: 1      levothyroxine (SYNTHROID) 100 MCG tablet Take 1 tablet by mouth Daily  Qty: 90 tablet, Refills: 3    Associated Diagnoses: Hypothyroidism, unspecified type      potassium chloride (KLOR-CON) 10 MEQ extended release tablet Take 1 tablet by mouth daily  Qty: 60 tablet, Refills: 5      warfarin (COUMADIN) 7.5 MG tablet 7.5mg on Monday/Wed/Fri and 15mg all other days  Qty: 180 tablet, Refills: 3    Associated Diagnoses: Anticoagulated on Coumadin; Chronic deep vein thrombosis (DVT) of proximal vein of lower extremity, unspecified laterality (HCC)      pantoprazole (PROTONIX) 40 MG tablet TAKE 1 TABLET BY MOUTH TWICE DAILY BEFORE MEAL(S)  Qty: 180 tablet, Refills: 1    Associated Diagnoses: Chronic heartburn; Gastroenteritis; Gastroesophageal reflux disease without esophagitis      clobetasol (TEMOVATE) 0.05 % cream Apply topically 2 times daily.   Qty: 3 each, Refills: 2      CPAP Machine MISC Inhale 1 each into the lungs nightly      Multiple Vitamins-Minerals (CENTRUM ADULTS) TABS Take 1 tablet by mouth daily      aspirin 81 MG tablet Take 81 mg by mouth daily             ALLERGIES     Insect extract allergy skin test, Tramadol, Lactose intolerance (gi), Prednisone, Zanaflex [tizanidine hcl], Lortab [hydrocodone-acetaminophen], Other, Oxycodone-acetaminophen, and Ultram [tramadol hcl]    FAMILY HISTORY       Family History   Problem Relation Age of Onset    Uterine Cancer Mother     Cervical Cancer Mother     Coronary Art Dis Mother     Heart Disease Father     Lung Cancer Father     Other Father         renal failure    Colon Cancer Brother     Colon Polyps Brother     Uterine Cancer Maternal Grandmother     Cervical Cancer Maternal Grandmother     Diabetes Maternal Grandmother     Cervical Cancer Sister     Other Sister         fibromyalgia    Diabetes Paternal Aunt     Breast Cancer Maternal Cousin     Esophageal Cancer Neg Hx     Liver Cancer Neg Hx     Liver Disease Neg Hx     Stomach Cancer Neg Hx     Rectal Cancer Neg Hx           SOCIAL HISTORY       Social History     Socioeconomic History    Marital status:      Spouse name: None    Number of children: 1    Years of education: None    Highest education level: None   Occupational History     Employer: [employer]    Tobacco Use    Smoking status: Never    Smokeless tobacco: Never   Vaping Use    Vaping Use: Never used   Substance and Sexual Activity    Alcohol use: No    Drug use: No    Sexual activity: Not Currently   Social History Narrative    Lives with daughter, son-in-law, granddaughter, niece. Drives very little, daughter usually transports pt. Works part time at [employer]. Has pet dog. Social Determinants of Health     Financial Resource Strain: Medium Risk    Difficulty of Paying Living Expenses: Somewhat hard   Food Insecurity: Food Insecurity Present    Worried About Running Out of Food in the Last Year: Sometimes true    Ran Out of Food in the Last Year: Never true   Transportation Needs: No Transportation Needs    Lack of Transportation (Medical): No    Lack of Transportation (Non-Medical): No   Physical Activity: Inactive    Days of Exercise per Week: 0 days    Minutes of Exercise per Session: 0 min       SCREENINGS    Clarisa Coma Scale  Eye Opening: Spontaneous  Best Verbal Response: Oriented  Best Motor Response: Obeys commands  Clarisa Coma Scale Score: 15        PHYSICAL EXAM    (up to 7 for level 4, 8 or more for level 5)     ED Triage Vitals [08/16/22 2013]   BP Temp Temp Source Heart Rate Resp SpO2 Height Weight   132/88 98 °F (36.7 °C) Oral 69 18 98 % 5' 7\" (1.702 m) 243 lb (110.2 kg)       Physical Exam  Vitals reviewed. Constitutional:       General: She is not in acute distress. Appearance: She is well-developed. HENT:      Head: Normocephalic and atraumatic.       Right Ear: Tympanic membrane, ear canal and external ear normal.      Left Ear: Tympanic membrane, ear canal and external ear normal.      Mouth/Throat:      Mouth: Mucous membranes are moist.      Pharynx: Oropharynx is clear. Eyes:      General: No scleral icterus. Right eye: No discharge. Left eye: No discharge. Comments: Strabismus of right eye which is chronic and baseline. Patient blind in left eye which is baseline. Pupil round and reactive to light left eye. Extraocular movements intact left eye. No visual deficits left eye. She has had recent change in vision in the left eye but this is not new and she has been seen by ophthalmology for this and has been told that she has a problem with her retina. Neck:      Vascular: No JVD. Cardiovascular:      Rate and Rhythm: Normal rate and regular rhythm. Pulses: Normal pulses. Heart sounds: Normal heart sounds. Pulmonary:      Effort: Pulmonary effort is normal. No respiratory distress. Breath sounds: Normal breath sounds. Abdominal:      General: There is no distension. Palpations: Abdomen is soft. There is no pulsatile mass. Tenderness: There is abdominal tenderness in the left lower quadrant. There is no right CVA tenderness, left CVA tenderness, guarding or rebound. Musculoskeletal:         General: No tenderness or deformity. Normal range of motion. Cervical back: Normal range of motion and neck supple. Legs:       Comments: No C, T or L-spine tenderness or step-off. Incision to the left knee healing well without signs of infection. No erythema or warmth. No tenderness or pain. Small incision just below the left knee that shows no erythema or warmth and has some scant serous drainage. Skin:     General: Skin is warm and dry. Capillary Refill: Capillary refill takes less than 2 seconds. Neurological:      General: No focal deficit present. Mental Status: She is alert and oriented to person, place, and time. GCS: GCS eye subscore is 4. GCS verbal subscore is 5. GCS motor subscore is 6. Cranial Nerves: Cranial nerves are intact. Sensory: Sensation is intact. Motor: Motor function is intact. Coordination: Coordination is intact. Psychiatric:         Mood and Affect: Mood normal.         Behavior: Behavior normal.       DIAGNOSTIC RESULTS     EKG: All EKG's are interpreted by the Emergency Department Physician who either signs or Co-signs this chart in the absence of a cardiologist.        RADIOLOGY:   Non-plain film images such as CT, Ultrasound and MRI are read by the radiologist. [place] images are visualized and preliminarily interpreted by the emergency physician with the below findings:        Interpretation per the Radiologist below, if available at the time of this note:    CT ABDOMEN PELVIS WO CONTRAST Additional Contrast? None   Final Result   1. No evidence for acute abdominal or pelvic process. 2.Anterior abdominal wall surgical mesh is seen. 3.Calcific atherosclerosis of the abdominal aorta. 4.Degenerative spondylosis. CT HEAD WO CONTRAST   Final Result   1. No acute intracranial abnormality identified. 2. Subtle small vessel chronic ischemic changes in bilateral cerebral white matter. 3. Mild age-related cerebral atrophy. 4. Increased asymmetric thickening of the outer cortex of the occipital bone to a maximum thickness of 12 mm. Recommendation: Follow up as clinically indicated. All CT scans at this facility utilize dose modulation, iterative reconstruction, and/or weight based dosing when appropriate to reduce radiation dose to as low as reasonably achievable. Electronically Signed by Shakila Estrella at 16-Aug-2022 11:45:38 PM               XR FEMUR RIGHT (MIN 2 VIEWS)   Final Result   No acute findings   Recommendation:    Follow up as clinically indicated.     Electronically Signed by Isis Saucedo DO at 16-Aug-2022 11:53:33 PM               XR PELVIS (1-2 VIEWS)   Final Result   Unremarkable exam.   Recommendation:    Follow up as clinically indicated. Electronically Signed by Karime Mahmood at 16-Aug-2022 11:43:57 PM               US RENAL COMPLETE    (Results Pending)   US RETROPERITONEAL COMPLETE    (Results Pending)         ED BEDSIDE ULTRASOUND:   Performed by ED Physician - none    LABS:  Labs Reviewed   CBC - Abnormal; Notable for the following components:       Result Value    RBC 3.40 (*)     Hemoglobin 9.8 (*)     Hematocrit 31.8 (*)     MCHC 30.8 (*)     All other components within normal limits   COMPREHENSIVE METABOLIC PANEL W/ REFLEX TO MG FOR LOW K - Abnormal; Notable for the following components:    Chloride 97 (*)     BUN 49 (*)     Creatinine 1.9 (*)     GFR Non- 26 (*)     GFR  31 (*)     All other components within normal limits   LIPASE - Abnormal; Notable for the following components:    Lipase 90 (*)     All other components within normal limits   PROTIME-INR - Abnormal; Notable for the following components:    Protime 19.1 (*)     INR 1.58 (*)     All other components within normal limits   TSH WITH REFLEX TO FT4 - Abnormal; Notable for the following components:    TSH Reflex FT4 0.14 (*)     All other components within normal limits   T4, FREE - Abnormal; Notable for the following components:    T4 Free 1.87 (*)     All other components within normal limits   SEDIMENTATION RATE - Abnormal; Notable for the following components:    Sed Rate 72 (*)     All other components within normal limits   C-REACTIVE PROTEIN - Abnormal; Notable for the following components:    CRP 0.67 (*)     All other components within normal limits   COVID-19, RAPID   URINALYSIS WITH REFLEX TO CULTURE   TROPONIN   LACTIC ACID   BRAIN NATRIURETIC PEPTIDE   EOSINOPHIL SMEAR, URINE   OSMOLALITY, URINE   SODIUM, URINE, RANDOM   CREATININE, RANDOM URINE   BASIC METABOLIC PANEL W/ REFLEX TO MG FOR LOW K       All other labs were within normal range or not returned as of this dictation.     EMERGENCY DEPARTMENT COURSE and DIFFERENTIALDIAGNOSIS/MDM:   Vitals:    Vitals:    08/16/22 2013 08/17/22 0130 08/17/22 0215 08/17/22 0232   BP: 132/88 (!) 142/73  (!) 146/82   Pulse: 69 61  61   Resp: 18 (!) 9  16   Temp: 98 °F (36.7 °C)   98.1 °F (36.7 °C)   TempSrc: Oral   Oral   SpO2: 98% 94%  99%   Weight: 243 lb (110.2 kg)  221 lb (100.2 kg)    Height: 5' 7\" (1.702 m)  5' 7\" (1.702 m)        MDM    Patient a little more anemic than prior labs. Denies black or bloody stool. Recommended [id number] to do guaiac testing. Patient declined rectal exam and guaiac testing at this time. Patient has evidence of mild MIREYA. Patient's case discussed with Dr. Matt Barros, hospitalist, who is agreeable plan of care and admission. Urinalysis still pending. Dr. Matt Barros will follow up on this. Patient resting comfortably and updated about plan. CONSULTS:  IP CONSULT TO NEPHROLOGY    PROCEDURES:  Unless otherwise notedbelow, none     Procedures    FINAL IMPRESSION     1. Anemia, unspecified type    2. MIREYA (acute kidney injury) ([facility] [address]    3. General weakness    4. Contusion of right lower extremity, initial encounter    5.  Syncope and collapse          DISPOSITION/PLAN   DISPOSITION Admitted 08/17/2022 12:33:42 AM      PATIENT REFERRED TO:  @FUP@    DISCHARGE MEDICATIONS:  Current Discharge Medication List             (Please note that portions of this note were completed with a voice recognition program.  Efforts were made to edit the dictations butoccasionally words are mis-transcribed.)    Brigitte Chirinos MD (electronically signed)  AttendingEmerNorthwest Health Phys[facility]' [facility] Physician          Brigitte Chirinos MD  08/17/22 [id number]

## 2022-08-17 NOTE — PLAN OF CARE
Nutrition Problem #1: Unintended weight loss, Inadequate oral intake  Intervention: Food and/or Nutrient Delivery: Continue Current Diet  Nutritional
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
## 2022-08-17 NOTE — CONSULTS
Nephrology ([facility]) Consult Note      Patient: Manoj Henley  YOB: 1950  Date of Service: 8/17/2022  MRN: [id number]   Acct: [de-identified]   Primary Care Physician: Christopher Alcazar MD  Advance Directive: Full Code  Admit Date: 8/16/2022       Hospital Day: 0  Referring Provider: Farheen Orlando MD    Patient independently seen and examined, Chart, Consults, Notes, Operative notes, Labs, Cardiology, and Radiology studies reviewed as available. Subjective: Manoj Henley is a 67 y.o. female for whom we were consulted for evaluation and treatment of chronic kidney disease stage IIIb. Patient follows with Dr. Clarissa Matias every 6 months and was last seen March of this year. Her GFR was 35 at that time. More recently she developed hypotension and syncope at home. States she is takes her lisinopril \"when she needs to. \"  It was 40 mg and her admission med list but 20 mg on our office. She otherwise denied chest pain, shortness of air at rest, nausea or vomiting.     Allergies:  Insect extract allergy skin test, Lactose intolerance (gi), Lortab [hydrocodone-acetaminophen], Other, Oxycodone-acetaminophen, Prednisone, Tramadol, Ultram [tramadol hcl], and Zanaflex [tizanidine hcl]    Medicines:  Current Facility-Administered Medications   Medication Dose Route Frequency Provider Last Rate Last Admin    calcitRIOL (ROCALTROL) capsule 0.25 mcg  0.25 mcg Oral Daily Richard Devries MD        baclofen (LIORESAL) tablet 10 mg  10 mg Oral TID PRN Richard Devries MD        escitalopram (LEXAPRO) tablet 20 mg  20 mg Oral Daily Richard Devries MD        ferrous gluconate 324 (37.5 Fe) MG tablet 324 mg  324 mg Oral BID WC Richard Devries MD        gabapentin (NEURONTIN) capsule 300 mg  300 mg Oral TID Richard Devries MD        hydrocortisone 1 % cream   Topical BID Richard Devries MD        levothyroxine (SYNTHROID) tablet 100 mcg  100 mcg Oral Daily Richard Devries MD   100 mcg at 08/17/22 0627    [facility] (SINGULAIR) tablet 10 mg  10 mg Oral Daily Jennifer Bang MD        therapeutic multivitamin-minerals 1 tablet  1 tablet Oral Daily Jennifer Bang MD        pantoprazole (PROTONIX) tablet 40 mg  40 mg Oral BID Jennifer Bang MD        rosuvastatin (CRESTOR) tablet 5 mg  5 mg Oral Daily Jennifer Bang MD        potassium chloride (KLOR-CON M) extended release tablet 10 mEq  10 mEq Oral Daily Jennifer Bang MD        sucralfate (CARAFATE) tablet 1 g  1 g Oral 4 times per day Jennifer Bang MD   1 g at 08/17/22 6204    ipratropium (ATROVENT) 0.02 % nebulizer solution 0.5 mg  0.5 mg Nebulization 4x daily Jennifer Bang MD   0.5 mg at 08/17/22 0740    sodium chloride flush 0.9 % injection 5-40 mL  5-40 mL IntraVENous 2 times per day Jennifer Bang MD        sodium chloride flush 0.9 % injection 5-40 mL  5-40 mL IntraVENous PRN Jennifer Bang MD        0.9 % sodium chloride infusion   IntraVENous PRN Jennifer Bang MD   Paused at 08/17/22 0712    ondansetron (ZOFRAN-ODT) disintegrating tablet 4 mg  4 mg Oral Q8H PRN Jennifer Bang MD        Or    [facility]ron [facility] injection 4 mg  4 mg IntraVENous Q6H PRN Jennifer Bang MD        acetaminophen (TYLENOL) tablet 650 mg  650 mg Oral Q6H PRN Jennifer Bang MD   650 mg at 08/17/22 2611    Or    acetaminophen (TYLENOL) suppository 650 mg  650 mg Rectal Q6H PRN Jennifer Bang MD        polyethylene glycol (GLYCOLAX) packet 17 g  17 g Oral Daily PRN Jennifer Bang MD        melatonin disintegrating tablet 5 mg  5 mg Oral Nightly PRN Jennifer Bang MD        calcium carbonate (TUMS) chewable tablet 500 mg  500 mg Oral TID PRN Jennifer Bang MD        heparin (porcine) injection 5,000 Units  5,000 Units SubCUTAneous 3 times per day Jennifer Bang MD   5,000 Units at 08/17/22 0628    0.9 % sodium chloride infusion   IntraVENous Continuous Rometta Marine,  mL/hr at 08/17/22 0649 Rate Verify at 08/17/22 1023       Past Medical History:  Past Medical History:   Diagnosis Date    Abdominal pain     Abnormal EKG     Acute sinusitis     Acute superficial venous thrombosis of left lower extremity     Allergic reaction to spider bite     Anemia     Anticoagulated     Anxiety     Arrhythmia     Asthmatic bronchitis without complication 9/83/4252    Ataxic gait     Lyons's esophagus     Bowel obstruction (HCC)     Burn of abdomen wall, second degree, initial encounter 8/27/2018    Callus     Cardiac pacemaker     Cerebral artery occlusion with cerebral infarction [facility])     CKD (chronic kidney disease) stage 2, GFR 60-89 ml/min     Coat's syndrome     Coat's syndrome     both eyes    COPD (chronic obstructive pulmonary disease) (HCC)     Depression     Diabetes mellitus type 2 in nonobese (HCC)     Diabetic nephropathy (HCC)     Disequilibrium     Dizziness     DVT (deep venous thrombosis) (HCC)     Exudative retinopathy     Fibromyalgia     Fibromyositis     Gastric ulcer     GERD (gastroesophageal reflux disease)     History of gastric bypass     Hx of blood clots     Hx of lupus anticoagulant disorder     Hyperlipidemia 5/6/2019    Hypertension     Hypothyroidism     Intermittent claudication (HCC)     Intestinal obstruction (HCC)     Iron deficiency     Left-sided weakness     Low vitamin D level     Lupus ([address]     Menopause     Obesity     Osteoarthritis     Osteoporosis     Other iron deficiency anemias     Palliative care patient 09/24/2020    Pernicious anemia     PONV (postoperative nausea and vomiting)     PUPP (pruritic urticarial papules and plaques of pregnancy)     Restless legs syndrome (RLS) 7/11/2019    Right leg numbness     Right sided sciatica     Sarcoidosis     with liver involvement    Sciatica     Secondary hyperparathyroidism ([address]     Shingles     Shortness of breath     Sleep apnea     Stomach ulcer     Syncope     Visual loss, one eye        Past Surgical History:  Past Surgical History:   Procedure Laterality Date    APPENDECTOMY      CARDIAC CATHETERIZATION 10/21/13  [address]    EF over 60%    CHOLECYSTECTOMY      COLONOSCOPY  02/2010    negative    COLONOSCOPY  02/22/2010    Dr Xenia Pardo    COLONOSCOPY  04/01/2016    Dr LAKHWINDER Horvath-internal hemorrhoids, 5 yr recall    COLONOSCOPY N/A 05/07/2021    Dr Claudia Gresham looping/tortuosity throughout the left colon, BE=Normal    DILATATION, ESOPHAGUS      EYE SURGERY      Cyst on Right eye    EYE SURGERY      GASTRIC BYPASS SURGERY      GASTRIC BYPASS SURGERY      HERNIA REPAIR      HYSTERECTOMY (CERVIX STATUS UNKNOWN)  1974    Complete    INCONTINENCE SURGERY      Bladder Sling    INFECTED SKIN DEBRIDEMENT Right     dog bite right forearm    OTHER SURGICAL HISTORY      IVC filter    PACEMAKER INSERTION      PACEMAKER PLACEMENT      [employer]tr[employer]    OK TOTAL KNEE ARTHROPLASTY Right 03/26/2018    TOTAL KNEE REPLACEMENT COMPLEX PRIMARY performed by Brayan Garcia MD at [facility]      KRISTEL AND BSO (CERVIX REMOVED) Bilateral 1974    age 21    TONSILLECTOMY AND ADENOIDECTOMY      TOTAL KNEE ARTHROPLASTY Left 07/19/2022    UPPER GASTROINTESTINAL ENDOSCOPY  12/2011    gerd s/p gastric bypass    UPPER GASTROINTESTINAL ENDOSCOPY  02/2014    normal s/p gastric bypass    UPPER GASTROINTESTINAL ENDOSCOPY  02/2010    biopsy neg Barretts, chronic reflux esophagitis s/p gastric bypass    UPPER GASTROINTESTINAL ENDOSCOPY  07/2006    unremarkable s/p gastric bypass    UPPER GASTROINTESTINAL ENDOSCOPY  08/10/2015    Dr Yoandy Shelton ENDOSCOPY N/A 04/01/2016    Dr. Nae Maldonado:  H Pylori(-), HH, o/w normal    UPPER GASTROINTESTINAL ENDOSCOPY N/A 05/07/2021    Dr Jimenez Slipper hiatal hernia, previous gastric bypass surgery, gastritis    VENA CAVA FILTER PLACEMENT Right 2003       Family History  Family History   Problem Relation Age of Onset    Uterine Cancer Mother     Cervical Cancer Mother     Coronary Art Dis Mother     Heart Disease Father     Lung Cancer Father Other Father         renal failure    Cervical Cancer Sister     Other Sister         fibromyalgia    Colon Cancer Brother     Colon Polyps Brother     Other Brother         owens    Uterine Cancer Maternal Grandmother     Cervical Cancer Maternal Grandmother     Diabetes Maternal Grandmother     Diabetes Paternal Aunt     Breast Cancer Maternal Cousin     No Known Problems Daughter     Esophageal Cancer Neg Hx     Liver Cancer Neg Hx     Liver Disease Neg Hx     Stomach Cancer Neg Hx     Rectal Cancer Neg Hx        Social History  Social History     Socioeconomic History    Marital status:      Spouse name: Not on file    Number of children: 1    Years of education: Not on file    Highest education level: Not on file   Occupational History     Employer: [employer]      Comment:    Tobacco Use    Smoking status: Never    Smokeless tobacco: Never   Vaping Use    Vaping Use: Never used   Substance and Sexual Activity    Alcohol use: Never    Drug use: Never    Sexual activity: Not Currently     Comment: has a daughter   Other Topics Concern    Not on file   Social History Narrative    CODE STATUS: Full Code    HEALTH CARE PROXY: her daughter, [phone]    Backup: Ziggy Ordoñez, grand daughter, [phone]    AMBULATES: uses a Rollator    DOMICILED: has steps to enter, no stairs inside, lives with her daughter and two grand daughters, her niece, and her         Lives with daughter, son-in-law, granddaughter, niece. Drives very little, daughter usually transports pt. Works part time at [employer]. Has pet dog.      Social Determinants of Health     Financial Resource Strain: Medium Risk    Difficulty of Paying Living Expenses: Somewhat hard   Food Insecurity: Food Insecurity Present    Worried About Running Out of Food in the Last Year: Sometimes true    Ran Out of Food in the Last Year: Never true   Transportation Needs: No Transportation Needs    Lack of Transportation (Medical): No    Lack of Transportation (Non-Medical): No   Physical Activity: Inactive    Days of Exercise per Week: 0 days    Minutes of Exercise per Session: 0 min   Stress: Not on file   Social Connections: Not on file   Intimate Partner Violence: Not on file   Housing Stability: Not on file         Review of Systems:  History obtained from chart review and the patient  General ROS: No fever or chills  Respiratory ROS: No cough, shortness of breath, wheezing  Cardiovascular ROS: No chest pain or palpitations  Gastrointestinal ROS: No abdominal pain or melena  Genito-Urinary ROS: No dysuria or hematuria  Musculoskeletal ROS: No joint pain or swelling   14 point ROS reviewed with the patient and negative except as noted above and in the HPI unless unable to obtain.     Objective:  Patient Vitals for the past 24 hrs:   BP Temp Temp src Pulse Resp SpO2 Height Weight   08/17/22 0634 122/72 97.5 °F (36.4 °C) Oral 61 16 98 % -- --   08/17/22 0232 (!) 146/82 98.1 °F (36.7 °C) Oral 61 16 99 % -- --   08/17/22 0215 -- -- -- -- -- -- 5' 7\" (1.702 m) 221 lb (100.2 kg)   08/17/22 0130 (!) 142/73 -- -- 61 (!) 9 94 % -- --   08/16/22 2013 132/88 98 °F (36.7 °C) Oral 69 18 98 % 5' 7\" (1.702 m) 243 lb (110.2 kg)       Intake/Output Summary (Last 24 hours) at 8/17/2022 0850  Last data filed at 8/17/2022 0649  Gross per 24 hour   Intake 645.55 ml   Output 450 ml   Net 195.55 ml     General: awake/alert   Chest:  clear to auscultation bilaterally  CVS: regular rate and rhythm  Abdominal: soft, nontender, normal bowel sounds  Extremities: no cyanosis or edema  Skin: warm and dry without rash      Labs:  BMP:   Recent Labs     08/16/22 2217 08/17/22  0633    141   K 4.1 4.2   CL 97* 105   CO2 26 26   BUN 49* 46*   CREATININE 1.9* 1.7*   CALCIUM 9.4 9.0     CBC:   Recent Labs     08/16/22 2217   WBC 8.8   HGB 9.8*   HCT 31.8*   MCV 93.5        LIVER PROFILE:   Recent Labs     08/16/22 2217   AST 21   ALT 11   LIPASE 90*   BILITOT 0.4   ALKPHOS 98     PT/INR:   Recent Labs     08/16/22 2217   PROTIME 19.1*   INR 1.58*     APTT: No results for input(s): APTT in the last 72 hours. BNP:  No results for input(s): BNP in the last 72 hours. Ionized Calcium:No results for input(s): IONCA in the last 72 hours. Magnesium:No results for input(s): MG in the last 72 hours. Phosphorus:No results for input(s): PHOS in the last 72 hours. HgbA1C: No results for input(s): LABA1C in the last 72 hours. Hepatic:   Recent Labs     08/16/22 2217   ALKPHOS 98   ALT 11   AST 21   PROT 8.4   BILITOT 0.4   LABALBU 3.8     Lactic Acid:   Recent Labs     08/16/22 2217   LACTA 1.6     Troponin: No results for input(s): CKTOTAL, CKMB, TROPONINT in the last 72 hours. ABGs: No results for input(s): PH, PCO2, PO2, HCO3, O2SAT in the last 72 hours. CRP:    Recent Labs     08/16/22 2217   CRP 0.67*     Sed Rate:    Recent Labs     08/16/22 2217   SEDRATE 72*         Cultures:   No results for input(s): CULTURE in the last 72 hours. No results for input(s): BC, Erby Morrow in the last 72 hours. No results for input(s): CXSURG in the last 72 hours. Radiology reports as per the Radiologist  Radiology: CT ABDOMEN PELVIS WO CONTRAST Additional Contrast? None    Result Date: 8/16/2022  EXAMINATION: CT of the abdomen and pelvis without IV contrast. COMPARISON: 4/13/2021. TECHNIQUE: CT of the abdomen and pelvis was obtained. Coronal and sagittal reformats available. FINDINGS: Limited evaluation for infectious or neoplastic process due to lack of intravenous contrast. LOWER CHEST: No infiltrate or effusion. LIVER: No focal mass lesion. Normal configuration. GALLBLADDER & BILIARY SYSTEM: No acute process. No intrahepatic or extrahepatic biliary ductal dilatation. SPLEEN: No splenomegaly. PANCREAS: Unremarkable for age. ADRENAL GLANDS: Unremarkable. KIDNEYS, URETERS, & BLADDER: No urolithiasis or hydronephrosis. No solid mass.  No pathologic bladder wall thickening. BOWEL: No pathologic bowel dilatation or bowel wall thickening. Gastric bypass surgical changes noted. Colonic diverticulosis without diverticulitis. Status post appendectomy. LYMPH NODES/MESENTERY: No pathologic lymphadenopathy. VASCULATURE: Calcific atherosclerosis of the aorta. IVC filter in place. PELVIC STRUCTURES: Status post hysterectomy. FREE FLUID/FREE AIR: None. BONES: Multilevel degenerative spondylosis with various degrees of disc space narrowing, facet arthropathy, and osteophytosis. BODY WALL: No acute process. 1.No evidence for acute abdominal or pelvic process. 2.Anterior abdominal wall surgical mesh is seen. 3.Calcific atherosclerosis of the abdominal aorta. 4.Degenerative spondylosis. XR PELVIS (1-2 VIEWS)    Result Date: 8/16/2022  NO PRIOR REPORT AVAILABLE Exam:X-RAY OF THE PELVIS Clinical data:Right leg pain. Technique:Single view of the pelvis. Prior studies: No prior studies submitted. Findings: Single view of the pelvis demonstrates intact proximal right and left femurs. The acetabular joints,sacroiliac joints, and symphysis pubis are preserved. The pubic rami and iliac wings are unremarkable. Unremarkable exam. Recommendation: Follow up as clinically indicated. Electronically Signed by Carina Henriquez at 16-Aug-2022 11:43:57 PM             XR FEMUR RIGHT (MIN 2 VIEWS)    Result Date: 8/16/2022  NO PRIOR REPORT AVAILABLE Exam: X-RAYS OF THE RIGHT FEMUR Clinical data:Fall, leg pain. Technique: Four views of the right femur. Prior studies: No prior studies submitted. Findings:No evidence of fracture or dislocation. Bone mineral density is preserved. Limited evaluation of the right hip joint shows no gross abnormality. Right knee prosthesis    No acute findings Recommendation: Follow up as clinically indicated.  Electronically Signed by Asmita Farr DO at 16-Aug-2022 11:53:33 PM             CT HEAD WO CONTRAST    Result Date: 8/16/2022  NO PRIOR REPORT AVAILABLE Exam: CT OF THE BRAIN WITHOUT CONTRAST Clinical data: Dizziness, fell. Technique: Contiguous axial images are obtained from the skull base to vertex without intravenous contrast. Reformatted/MPR images were performed. Radiation dose: CTDIvol = 42.26 mGy, DLP = 755 mGy x cm. Prior studies: No prior studies submitted. Findings: No acute intracranial abnormality is present. Subtle small vessel chronic ischemic changes identified in bilateral cerebral white matter. There is mild prominence of the ventricles, cisterns and sulci. No evidence of acute cortical infarction, hemorrhage, mass or mass effect. No hydrocephalus or abnormal extra-axial fluid collections are present. The posterior fossa is unremarkable. The skull base and calvarium are intact. There is increased asymmetric thickening of the outer cortex of the occipital bone to a maximum thickness of 12 mm. The included portions of the paranasal sinuses and mastoid air cells are clear. Small partially calcified right orbit. 1. No acute intracranial abnormality identified. 2. Subtle small vessel chronic ischemic changes in bilateral cerebral white matter. 3. Mild age-related cerebral atrophy. 4. Increased asymmetric thickening of the outer cortex of the occipital bone to a maximum thickness of 12 mm. Recommendation: Follow up as clinically indicated. All CT scans at this facility utilize dose modulation, iterative reconstruction, and/or weight based dosing when appropriate to reduce radiation dose to as low as reasonably achievable.  Electronically Signed by Sree Barakat at 16-Aug-2022 11:45:38 PM                Assessment   Acute kidney injury  Chronic kidney disease stage IIIb  Hypertension with recent hypotension  Secondary hyperparathyroidism  Vitamin D deficiency    Plan:  Discussed with patient, nursing  Work-up ordered ongoing  Monitor labs  Can hold blood pressure medications for now, will plan to resume lisinopril or losartan when able  Fluid trial is reasonable and already improved creatinine a bit  Reassess in the morning      Thank you for the consult, we appreciate the opportunity to provide care to your patients. Feel free to contact me if I can be of any further assistance.       Melissa Werner MD  08/17/22  8:50 AM

## 2022-08-18 VITALS
RESPIRATION RATE: 16 BRPM | HEIGHT: 67 IN | SYSTOLIC BLOOD PRESSURE: 152 MMHG | BODY MASS INDEX: 36.74 KG/M2 | OXYGEN SATURATION: 99 % | DIASTOLIC BLOOD PRESSURE: 79 MMHG | TEMPERATURE: 98.1 F | HEART RATE: 63 BPM | WEIGHT: 234.1 LBS

## 2022-08-18 LAB
ANION GAP SERPL CALCULATED.3IONS-SCNC: 9 MMOL/L (ref 7–19)
BASOPHILS ABSOLUTE: 0 K/UL (ref 0–0.2)
BASOPHILS RELATIVE PERCENT: 0.3 % (ref 0–1)
BUN BLDV-MCNC: 36 MG/DL (ref 8–23)
CALCIUM SERPL-MCNC: 8.7 MG/DL (ref 8.8–10.2)
CHLORIDE BLD-SCNC: 108 MMOL/L (ref 98–111)
CO2: 22 MMOL/L (ref 22–29)
CREAT SERPL-MCNC: 1.4 MG/DL (ref 0.5–0.9)
EOSINOPHILS ABSOLUTE: 0.2 K/UL (ref 0–0.6)
EOSINOPHILS RELATIVE PERCENT: 2.2 % (ref 0–5)
GFR AFRICAN AMERICAN: 45
GFR NON-AFRICAN AMERICAN: 37
GLUCOSE BLD-MCNC: 93 MG/DL (ref 74–109)
HCT VFR BLD CALC: 25.3 % (ref 37–47)
HEMOGLOBIN: 8 G/DL (ref 12–16)
IMMATURE GRANULOCYTES #: 0.1 K/UL
LYMPHOCYTES ABSOLUTE: 2.4 K/UL (ref 1.1–4.5)
LYMPHOCYTES RELATIVE PERCENT: 35.4 % (ref 20–40)
MAGNESIUM: 2 MG/DL (ref 1.6–2.4)
MCH RBC QN AUTO: 29.4 PG (ref 27–31)
MCHC RBC AUTO-ENTMCNC: 31.6 G/DL (ref 33–37)
MCV RBC AUTO: 93 FL (ref 81–99)
MONOCYTES ABSOLUTE: 0.9 K/UL (ref 0–0.9)
MONOCYTES RELATIVE PERCENT: 13.5 % (ref 0–10)
NEUTROPHILS ABSOLUTE: 3.3 K/UL (ref 1.5–7.5)
NEUTROPHILS RELATIVE PERCENT: 47.7 % (ref 50–65)
PARATHYROID HORMONE INTACT: 47.8 PG/ML (ref 15–65)
PDW BLD-RTO: 14.6 % (ref 11.5–14.5)
PHOSPHORUS: 4.1 MG/DL (ref 2.5–4.5)
PLATELET # BLD: 246 K/UL (ref 130–400)
PMV BLD AUTO: 10.4 FL (ref 9.4–12.3)
POTASSIUM REFLEX MAGNESIUM: 4.1 MMOL/L (ref 3.5–5)
RBC # BLD: 2.72 M/UL (ref 4.2–5.4)
SODIUM BLD-SCNC: 139 MMOL/L (ref 136–145)
VITAMIN D 25-HYDROXY: 29.1 NG/ML
WBC # BLD: 6.9 K/UL (ref 4.8–10.8)

## 2022-08-18 PROCEDURE — 83735 ASSAY OF MAGNESIUM: CPT

## 2022-08-18 PROCEDURE — 6370000000 HC RX 637 (ALT 250 FOR IP): Performed by: INTERNAL MEDICINE

## 2022-08-18 PROCEDURE — 6360000002 HC RX W HCPCS: Performed by: HOSPITALIST

## 2022-08-18 PROCEDURE — 83970 ASSAY OF PARATHORMONE: CPT

## 2022-08-18 PROCEDURE — 94640 AIRWAY INHALATION TREATMENT: CPT

## 2022-08-18 PROCEDURE — 97116 GAIT TRAINING THERAPY: CPT

## 2022-08-18 PROCEDURE — 84100 ASSAY OF PHOSPHORUS: CPT

## 2022-08-18 PROCEDURE — 80048 BASIC METABOLIC PNL TOTAL CA: CPT

## 2022-08-18 PROCEDURE — 2580000003 HC RX 258: Performed by: HOSPITALIST

## 2022-08-18 PROCEDURE — 6370000000 HC RX 637 (ALT 250 FOR IP): Performed by: HOSPITALIST

## 2022-08-18 PROCEDURE — 82306 VITAMIN D 25 HYDROXY: CPT

## 2022-08-18 PROCEDURE — 36415 COLL VENOUS BLD VENIPUNCTURE: CPT

## 2022-08-18 PROCEDURE — 85025 COMPLETE CBC W/AUTO DIFF WBC: CPT

## 2022-08-18 PROCEDURE — 2580000003 HC RX 258: Performed by: EMERGENCY MEDICINE

## 2022-08-18 RX ORDER — VITAMIN B COMPLEX
1000 TABLET ORAL DAILY
Status: DISCONTINUED | OUTPATIENT
Start: 2022-08-18 | End: 2022-08-18 | Stop reason: HOSPADM

## 2022-08-18 RX ORDER — BUMETANIDE 2 MG/1
1 TABLET ORAL DAILY PRN
Qty: 90 TABLET | Refills: 0 | Status: SHIPPED | OUTPATIENT
Start: 2022-08-18

## 2022-08-18 RX ORDER — LEVOTHYROXINE SODIUM 0.07 MG/1
75 TABLET ORAL DAILY
Qty: 30 TABLET | Refills: 3 | Status: SHIPPED | OUTPATIENT
Start: 2022-08-19

## 2022-08-18 RX ADMIN — MONTELUKAST 10 MG: 10 TABLET, FILM COATED ORAL at 07:54

## 2022-08-18 RX ADMIN — Medication 1000 UNITS: at 13:21

## 2022-08-18 RX ADMIN — SUCRALFATE 1 G: 1 TABLET ORAL at 05:56

## 2022-08-18 RX ADMIN — Medication 324 MG: at 07:54

## 2022-08-18 RX ADMIN — HEPARIN SODIUM 5000 UNITS: 5000 INJECTION INTRAVENOUS; SUBCUTANEOUS at 05:56

## 2022-08-18 RX ADMIN — PANTOPRAZOLE SODIUM 40 MG: 40 TABLET, DELAYED RELEASE ORAL at 07:54

## 2022-08-18 RX ADMIN — CALCITRIOL CAPSULES 0.25 MCG 0.25 MCG: 0.25 CAPSULE ORAL at 07:54

## 2022-08-18 RX ADMIN — SUCRALFATE 1 G: 1 TABLET ORAL at 12:08

## 2022-08-18 RX ADMIN — ROSUVASTATIN CALCIUM 5 MG: 10 TABLET, FILM COATED ORAL at 07:54

## 2022-08-18 RX ADMIN — SODIUM CHLORIDE: 9 INJECTION, SOLUTION INTRAVENOUS at 07:53

## 2022-08-18 RX ADMIN — MULTIPLE VITAMINS W/ MINERALS TAB 1 TABLET: TAB at 07:54

## 2022-08-18 RX ADMIN — LEVOTHYROXINE SODIUM 75 MCG: 50 TABLET ORAL at 05:56

## 2022-08-18 RX ADMIN — SUCRALFATE 1 G: 1 TABLET ORAL at 00:43

## 2022-08-18 RX ADMIN — IPRATROPIUM BROMIDE 0.5 MG: 0.5 SOLUTION RESPIRATORY (INHALATION) at 10:58

## 2022-08-18 RX ADMIN — ESCITALOPRAM OXALATE 20 MG: 10 TABLET ORAL at 07:54

## 2022-08-18 RX ADMIN — GABAPENTIN 300 MG: 300 CAPSULE ORAL at 07:54

## 2022-08-18 RX ADMIN — Medication 10 ML: at 07:54

## 2022-08-18 RX ADMIN — IPRATROPIUM BROMIDE 0.5 MG: 0.5 SOLUTION RESPIRATORY (INHALATION) at 07:19

## 2022-08-18 RX ADMIN — POTASSIUM CHLORIDE 10 MEQ: 750 TABLET, EXTENDED RELEASE ORAL at 07:54

## 2022-08-18 NOTE — PROGRESS NOTES
Nephrology ([facility]) Consult Note      Patient: Carlita Cano  YOB: 1950  Date of Service: 8/18/2022  MRN: [id number]   Acct: [de-identified]   Primary Care Physician: Rico Young MD  Advance Directive: Full Code  Admit Date: 8/16/2022       Hospital Day: 1  Referring Provider: Sixto Guzman MD    Patient independently seen and examined, Chart, Consults, Notes, Operative notes, Labs, Cardiology, and Radiology studies reviewed as available. Subjective: Carlita Cano is a 67 y.o. female for whom we were consulted for evaluation and treatment of chronic kidney disease stage IIIb. Patient follows with Dr. Natalee Linares every 6 months and was last seen March of this year. Her GFR was 35 at that time. More recently she developed hypotension and syncope at home. States she is takes her lisinopril \"when she needs to. \"  It was 40 mg and her admission med list but 20 mg on our office. She otherwise denied chest pain, shortness of air at rest, nausea or vomiting. Today, no overnight events. She was hopeful for discharge today. She denied current chest pain, shortness of air at rest, nausea or vomiting.     Allergies:  Insect extract allergy skin test, Lactose intolerance (gi), Lortab [hydrocodone-acetaminophen], Other, Oxycodone-acetaminophen, Prednisone, Tramadol, Ultram [tramadol hcl], and Zanaflex [tizanidine hcl]    Medicines:  Current Facility-Administered Medications   Medication Dose Route Frequency Provider Last Rate Last Admin    Vitamin D (CHOLECALCIFEROL) tablet 1,000 Units  1,000 Units Oral Daily Angelito Chavira MD        calcitRIOL (ROCALTROL) capsule 0.25 mcg  0.25 mcg Oral Daily Margaret Prather MD   0.25 mcg at 08/18/22 0754    baclofen (LIORESAL) tablet 10 mg  10 mg Oral TID PRN Margaret Prather MD        escitalopram (LEXAPRO) tablet 20 mg  20 mg Oral Daily Margaret Prather MD   20 mg at 08/18/22 0754    ferrous gluconate 324 (37.5 Fe) MG tablet 324 mg  324 mg Oral BID WC Елена Montoya MD   324 mg at 08/18/22 0754    gabapentin (NEURONTIN) capsule 300 mg  300 mg Oral TID Елена Montoya MD   300 mg at 08/18/22 0754    hydrocortisone 1 % cream   Topical BID Елена Montoya MD        montelukast (SINGULAIR) tablet 10 mg  10 mg Oral Daily Елена Montoya MD   10 mg at 08/18/22 0754    therapeutic multivitamin-minerals 1 tablet  1 tablet Oral Daily Елена Montoya MD   1 tablet at 08/18/22 0754    pantoprazole (PROTONIX) tablet 40 mg  40 mg Oral BID Елена Montoya MD   40 mg at 08/18/22 0754    rosuvastatin (CRESTOR) tablet 5 mg  5 mg Oral Daily Елена Montoya MD   5 mg at 08/18/22 0754    potassium chloride (KLOR-CON M) extended release tablet 10 mEq  10 mEq Oral Daily Елена Montoya MD   10 mEq at 08/18/22 0754    sucralfate (CARAFATE) tablet 1 g  1 g Oral 4 times per day Елена Montoya MD   1 g at 08/18/22 0556    ipratropium (ATROVENT) 0.02 % nebulizer solution 0.5 mg  0.5 mg Nebulization 4x daily Елена Montoya MD   0.5 mg at 08/18/22 1058    sodium chloride flush 0.9 % injection 5-40 mL  5-40 mL IntraVENous 2 times per day Елена Montoya MD   10 mL at 08/18/22 0754    sodium chloride flush 0.9 % injection 5-40 mL  5-40 mL IntraVENous PRN Елена Montoya MD        0.9 % sodium chloride infusion   IntraVENous PRLAMBERTO Montoya MD   Paused at 08/17/22 0712    ondansetron (ZOFRAN-ODT) disintegrating tablet 4 mg  4 mg Oral Q8H PRN Елена Montoya MD        Or    ondansetron [facility]) injection 4 mg  4 mg IntraVENous Q6H PRN Елена Montoya MD        acetaminophen (TYLENOL) tablet 650 mg  650 mg Oral Q6H PRN Елена Montoya MD   650 mg at 08/17/22 2339    Or    acetaminophen (TYLENOL) suppository 650 mg  650 mg Rectal Q6H PRN Елена Montoya MD        polyethylene glycol (GLYCOLAX) packet 17 g  17 g Oral Daily PRN Елена Montoya MD        melatonin disintegrating tablet 5 mg  5 mg Oral Nightly NIYAH Montoya MD        calcium carbonate (TUMS) chewable tablet 500 mg  500 mg Oral TID PRN Bhupinder Noyola MD        heparin (porcine) injection 5,000 Units  5,000 Units SubCUTAneous 3 times per day Bhupinder Noyola MD   5,000 Units at 08/18/22 0556    levothyroxine (SYNTHROID) tablet 75 mcg  75 mcg Oral Daily Bernard Brantley MD   75 mcg at 08/18/22 0556       Past Medical History:  Past Medical History:   Diagnosis Date    Abdominal pain     Abnormal EKG     Acute sinusitis     Acute superficial venous thrombosis of left lower extremity     Allergic reaction to spider bite     Anemia     Anticoagulated     Anxiety     Arrhythmia     Asthmatic bronchitis without complication 4/35/4158    Ataxic gait     Lyons's esophagus     Bowel obstruction (HCC)     Burn of abdomen wall, second degree, initial encounter 8/27/2018    Callus     Cardiac pacemaker     Cerebral artery occlusion with cerebral infarction [facility])     CKD (chronic kidney disease) stage 2, GFR 60-89 ml/min     Coat's syndrome     Coat's syndrome     both eyes    COPD (chronic obstructive pulmonary disease) (HCC)     Depression     Diabetes mellitus type 2 in nonobese (HCC)     Diabetic nephropathy (HCC)     Disequilibrium     Dizziness     DVT (deep venous thrombosis) (HCC)     Exudative retinopathy     Fibromyalgia     Fibromyositis     Gastric ulcer     GERD (gastroesophageal reflux disease)     History of gastric bypass     Hx of blood clots     Hx of lupus anticoagulant disorder     Hyperlipidemia 5/6/2019    Hypertension     Hypothyroidism     Intermittent claudication (HCC)     Intestinal obstruction (HCC)     Iron deficiency     Left-sided weakness     Low vitamin D level     Lupus ([address]     Menopause     Obesity     Osteoarthritis     Osteoporosis     Other iron deficiency anemias     Palliative care patient 09/24/2020    Pernicious anemia     PONV (postoperative nausea and vomiting)     PUPP (pruritic urticarial papules and plaques of pregnancy)     Restless legs syndrome (RLS) 7/11/2019    Right leg numbness     Right sided sciatica Sarcoidosis     with liver involvement    Sciatica     Secondary hyperparathyroidism (HCC)     Shingles     Shortness of breath     Sleep apnea     Stomach ulcer     Syncope     Visual loss, one eye        Past Surgical History:  Past Surgical History:   Procedure Laterality Date    APPENDECTOMY      CARDIAC CATHETERIZATION  10/21/13  [facility]    EF over 60%    CHOLECYSTECTOMY      COLONOSCOPY  02/2010    negative    COLONOSCOPY  02/22/2010    Dr Magallanes [facility]    COLONOSCOPY  04/01/2016    Dr LAKHWINDER Horvath-internal hemorrhoids, 5 yr recall    COLONOSCOPY N/A 05/07/2021    Dr Britta Matute looping/tortuosity throughout the left colon, BE=Normal    DILATATION, ESOPHAGUS      EYE SURGERY      Cyst on Right eye    EYE SURGERY      GASTRIC BYPASS SURGERY      GASTRIC BYPASS SURGERY      HERNIA REPAIR      HYSTERECTOMY (CERVIX STATUS UNKNOWN)  1974    Complete    INCONTINENCE SURGERY      Bladder Sling    INFECTED SKIN DEBRIDEMENT Right     dog bite right forearm    OTHER SURGICAL HISTORY      IVC filter    PACEMAKER INSERTION      PACEMAKER PLACEMENT      medtronic    DC TOTAL KNEE ARTHROPLASTY Right 03/26/2018    TOTAL KNEE REPLACEMENT COMPLEX PRIMARY performed by Juan Alex MD at [address] (CERVIX REMOVED) Bilateral 1974    age 21    TONSILLECTOMY AND ADENOIDECTOMY      TOTAL KNEE ARTHROPLASTY Left 07/19/2022    UPPER GASTROINTESTINAL ENDOSCOPY  12/2011    gerd s/p gastric bypass    UPPER GASTROINTESTINAL ENDOSCOPY  02/2014    normal s/p gastric bypass    UPPER GASTROINTESTINAL ENDOSCOPY  02/2010    biopsy neg Barretts, chronic reflux esophagitis s/p gastric bypass    UPPER GASTROINTESTINAL ENDOSCOPY  07/2006    unremarkable s/p gastric bypass    UPPER GASTROINTESTINAL ENDOSCOPY  08/10/2015    Dr Elaine Kumar N/A 04/01/2016    Dr. Hurtado Shells:  H Pylori(-), HH, o/w normal    UPPER GASTROINTESTINAL ENDOSCOPY N/A Financial Resource Strain: Medium Risk    Difficulty of Paying Living Expenses: Somewhat hard   Food Insecurity: Food Insecurity Present    Worried About Running Out of Food in the Last Year: Sometimes true    Ran Out of Food in the Last Year: Never true   Transportation Needs: No Transportation Needs    Lack of Transportation (Medical): No    Lack of Transportation (Non-Medical): No   Physical Activity: Inactive    Days of Exercise per Week: 0 days    Minutes of Exercise per Session: 0 min   Stress: Not on file   Social Connections: Not on file   Intimate Partner Violence: Not on file   Housing Stability: Not on file         Review of Systems:  History obtained from chart review and the patient  General ROS: No fever or chills  Respiratory ROS: No cough, shortness of breath, wheezing  Cardiovascular ROS: No chest pain or palpitations  Gastrointestinal ROS: No abdominal pain or melena  Genito-Urinary ROS: No dysuria or hematuria  Musculoskeletal ROS: No joint pain or swelling   14 point ROS reviewed with the patient and negative except as noted above and in the HPI unless unable to obtain.     Objective:  Patient Vitals for the past 24 hrs:   BP Temp Temp src Pulse Resp SpO2 Height Weight   08/18/22 1042 -- -- -- -- -- -- -- 234 lb 1.6 oz (106.2 kg)   08/18/22 0617 137/69 99 °F (37.2 °C) Oral 66 16 94 % -- --   08/18/22 0005 116/64 98.6 °F (37 °C) Oral 69 16 97 % -- --   08/17/22 1815 119/60 97.9 °F (36.6 °C) Oral 80 16 98 % -- --   08/17/22 1235 -- -- -- -- -- -- 5' 7\" (1.702 m) --   08/17/22 1148 106/75 97.5 °F (36.4 °C) Oral 67 18 100 % -- --       Intake/Output Summary (Last 24 hours) at 8/18/2022 1115  Last data filed at 8/18/2022 1042  Gross per 24 hour   Intake 2319.53 ml   Output 1100 ml   Net 1219.53 ml     General: awake/alert   Chest:  clear to auscultation bilaterally  CVS: regular rate and rhythm  Abdominal: soft, nontender, normal bowel sounds  Extremities: no cyanosis or edema  Skin: warm and dry without rash      Labs:  BMP:   Recent Labs     08/16/22 2217 08/17/22  0633 08/18/22 0316    141 139   K 4.1 4.2 4.1   CL 97* 105 108   CO2 26 26 22   PHOS  --   --  4.1   BUN 49* 46* 36*   CREATININE 1.9* 1.7* 1.4*   CALCIUM 9.4 9.0 8.7*     CBC:   Recent Labs     08/16/22 2217 08/18/22 0316   WBC 8.8 6.9   HGB 9.8* 8.0*   HCT 31.8* 25.3*   MCV 93.5 93.0    246     LIVER PROFILE:   Recent Labs     08/16/22 2217   AST 21   ALT 11   LIPASE 90*   BILITOT 0.4   ALKPHOS 98     PT/INR:   Recent Labs     08/16/22 2217   PROTIME 19.1*   INR 1.58*     APTT: No results for input(s): APTT in the last 72 hours. BNP:  No results for input(s): BNP in the last 72 hours. Ionized Calcium:No results for input(s): IONCA in the last 72 hours. Magnesium:  Recent Labs     08/18/22 0316   MG 2.0     Phosphorus:  Recent Labs     08/18/22 0316   PHOS 4.1     HgbA1C: No results for input(s): LABA1C in the last 72 hours. Hepatic:   Recent Labs     08/16/22 2217   ALKPHOS 98   ALT 11   AST 21   PROT 8.4   BILITOT 0.4   LABALBU 3.8     Lactic Acid:   Recent Labs     08/16/22 2217   LACTA 1.6     Troponin: No results for input(s): CKTOTAL, CKMB, TROPONINT in the last 72 hours. ABGs: No results for input(s): PH, PCO2, PO2, HCO3, O2SAT in the last 72 hours. CRP:    Recent Labs     08/16/22 2217   CRP 0.67*     Sed Rate:    Recent Labs     08/16/22 2217   SEDRATE 72*         Cultures:   No results for input(s): CULTURE in the last 72 hours. No results for input(s): BC, Joanne Idler in the last 72 hours. No results for input(s): CXSURG in the last 72 hours. Radiology reports as per the Radiologist  Radiology: CT ABDOMEN PELVIS WO CONTRAST Additional Contrast? None    Result Date: 8/16/2022  EXAMINATION: CT of the abdomen and pelvis without IV contrast. COMPARISON: 4/13/2021. TECHNIQUE: CT of the abdomen and pelvis was obtained. Coronal and sagittal reformats available.  FINDINGS: Limited evaluation for infectious or neoplastic process due to lack of intravenous contrast. LOWER CHEST: No infiltrate or effusion. LIVER: No focal mass lesion. Normal configuration. GALLBLADDER & BILIARY SYSTEM: No acute process. No intrahepatic or extrahepatic biliary ductal dilatation. SPLEEN: No splenomegaly. PANCREAS: Unremarkable for age. ADRENAL GLANDS: Unremarkable. KIDNEYS, URETERS, & BLADDER: No urolithiasis or hydronephrosis. No solid mass. No pathologic bladder wall thickening. BOWEL: No pathologic bowel dilatation or bowel wall thickening. Gastric bypass surgical changes noted. Colonic diverticulosis without diverticulitis. Status post appendectomy. LYMPH NODES/MESENTERY: No pathologic lymphadenopathy. VASCULATURE: Calcific atherosclerosis of the aorta. IVC filter in place. PELVIC STRUCTURES: Status post hysterectomy. FREE FLUID/FREE AIR: None. BONES: Multilevel degenerative spondylosis with various degrees of disc space narrowing, facet arthropathy, and osteophytosis. BODY WALL: No acute process. 1.No evidence for acute abdominal or pelvic process. 2.Anterior abdominal wall surgical mesh is seen. 3.Calcific atherosclerosis of the abdominal aorta. 4.Degenerative spondylosis. XR PELVIS (1-2 VIEWS)    Result Date: 8/16/2022  NO PRIOR REPORT AVAILABLE Exam:X-RAY OF THE PELVIS Clinical data:Right leg pain. Technique:Single view of the pelvis. Prior studies: No prior studies submitted. Findings: Single view of the pelvis demonstrates intact proximal right and left femurs. The acetabular joints,sacroiliac joints, and symphysis pubis are preserved. The pubic rami and iliac wings are unremarkable. Unremarkable exam. Recommendation: Follow up as clinically indicated. Electronically Signed by Pamela Villeda at 16-Aug-2022 11:43:57 PM             XR FEMUR RIGHT (MIN 2 VIEWS)    Result Date: 8/16/2022  NO PRIOR REPORT AVAILABLE Exam: X-RAYS OF THE RIGHT FEMUR Clinical data:Fall, leg pain. Technique: Four views of the right femur.  Prior studies: No prior studies submitted. Findings:No evidence of fracture or dislocation. Bone mineral density is preserved. Limited evaluation of the right hip joint shows no gross abnormality. Right knee prosthesis    No acute findings Recommendation: Follow up as clinically indicated. Electronically Signed by Flynn Luong DO at 16-Aug-2022 11:53:33 PM             CT HEAD WO CONTRAST    Result Date: 8/16/2022  NO PRIOR REPORT AVAILABLE Exam: CT OF THE BRAIN WITHOUT CONTRAST Clinical data: Dizziness, fell. Technique: Contiguous axial images are obtained from the skull base to vertex without intravenous contrast. Reformatted/MPR images were performed. Radiation dose: CTDIvol = 42.26 mGy, DLP = 755 mGy x cm. Prior studies: No prior studies submitted. Findings: No acute intracranial abnormality is present. Subtle small vessel chronic ischemic changes identified in bilateral cerebral white matter. There is mild prominence of the ventricles, cisterns and sulci. No evidence of acute cortical infarction, hemorrhage, mass or mass effect. No hydrocephalus or abnormal extra-axial fluid collections are present. The posterior fossa is unremarkable. The skull base and calvarium are intact. There is increased asymmetric thickening of the outer cortex of the occipital bone to a maximum thickness of 12 mm. The included portions of the paranasal sinuses and mastoid air cells are clear. Small partially calcified right orbit. 1. No acute intracranial abnormality identified. 2. Subtle small vessel chronic ischemic changes in bilateral cerebral white matter. 3. Mild age-related cerebral atrophy. 4. Increased asymmetric thickening of the outer cortex of the occipital bone to a maximum thickness of 12 mm. Recommendation: Follow up as clinically indicated. All CT scans at this facility utilize dose modulation, iterative reconstruction, and/or weight based dosing when appropriate to reduce radiation dose to as low as reasonably achievable. Electronically Signed by hSakila Estrella at 16-Aug-2022 11:45:38 PM                Assessment   Acute kidney injury  Chronic kidney disease stage IIIb  Hypertension with recent hypotension  Secondary hyperparathyroidism  Vitamin D deficiency    Plan:  Discussed with patient, nursing  Work-up reviewed todate  Monitor labs  Can hold blood pressure medications for now, will plan to resume lisinopril or losartan when able  Fluid trial is reasonable and already improved creatinine a bit  Follow-up in the office 1 week at discharge  Add vitamin D  Wean IV fluids      Thank you for the consult, we appreciate the opportunity to provide care to your patients. Feel free to contact me if I can be of any further assistance.       Cheryle Mix, MD  08/18/22  11:15 AM

## 2022-08-18 NOTE — PROGRESS NOTES
Date:2022  Patient: Karlee Garcia  : 1950  ITN:[id number]  CODE:                                                                        Denzel Austin MD    Admit Date: 2022  8:45 PM   LOS: 0 days     Hospital course : Mrs. Karlee Garcia is a pleasant 67year old  lady from home. She states that she had been having low Bps at home \"right after lisinopril. She never had that happen with lisinopril before. She passed out when in the living room and hit the floor. Subjective:  seen and evaluated at bedside she is feeling better after getting treatment since admission no acute complaint of nausea vomiting diarrhea, no fever or chills, no lightheadedness, discussed with RN at the bedside to obtain orthostatic vitals to guide further IV fluids. Review of Systems   Constitutional:  Positive for chills. Negative for appetite change, diaphoresis, fatigue and fever. HENT:  Negative for congestion, mouth sores and sinus pain. Eyes:  Negative for discharge. Respiratory:  Negative for cough and chest tightness. Cardiovascular:  Negative for chest pain and palpitations. Gastrointestinal:  Negative for abdominal pain and constipation.      Reviewed 12 system and found most of them negative except as stated above in subjective note    Objective:      Vital signs in last 24 hours:  Patient Vitals for the past 24 hrs:   BP Temp Temp src Pulse Resp SpO2 Height Weight   22 1815 119/60 97.9 °F (36.6 °C) Oral 80 16 98 % -- --   22 1235 -- -- -- -- -- -- 5' 7\" (1.702 m) --   22 1148 106/75 97.5 °F (36.4 °C) Oral 67 18 100 % -- --   22 0634 122/72 97.5 °F (36.4 °C) Oral 61 16 98 % -- --   22 0232 (!) 146/82 98.1 °F (36.7 °C) Oral 61 16 99 % -- --   22 0215 -- -- -- -- -- -- 5' 7\" (1.702 m) 221 lb (100.2 kg)   22 0130 (!) 142/73 -- -- 61 (!) 9 94 % -- --   22 132/88 98 °F (36.7 °C) Oral 69 18 98 % 5' 7\" (1.702 m) 243 lb (110.2 kg)       Patient examined with appropriate PPE  Physical Exam:  Vital Signs: /60   Pulse 80   Temp 97.9 °F (36.6 °C) (Oral)   Resp 16   Ht 5' 7\" (1.702 m)   Wt 221 lb (100.2 kg)   SpO2 98%   BMI 34.61 kg/m²   General appearance:. Lying comfortably in bed ,   HEENT: Normocephalic , Atraumatic, PERRL, JVP not raised  Chest: On inspection no use of accessory muscles of respiration, on auscultation vesicular breath sounds equal bilaterally no rales rhonchi or wheezing   cardiac: Regular rate and rhythm, S1, S2 normal. No murmurs, gallops, or rubs auscultated. Abdomen:soft, non-tender; normal bowel sounds, no masses, no organomegaly. Urogenital : no christian, no suprapubic tenderness, no CVA tenderness  Extremities: No clubbing or cyanosis. No peripheral edema. Peripheral pulses palpable.   Neurologic: Alert and Cooperative , cranial nerves grossly intact, DTR equal, Power  /5   Psychology: No hallucination, no delusion, appropriate mood          Lab Review   Recent Results (from the past 24 hour(s))   EKG 12 Lead    Collection Time: 08/16/22  8:21 PM   Result Value Ref Range    P-R Interval 140 ms    QRS Duration 120 ms    Q-T Interval 408 ms    QTc Calculation (Bazett) 427 ms    P Axis 29 degrees    T Axis 102 degrees   Troponin    Collection Time: 08/16/22 10:17 PM   Result Value Ref Range    Troponin <0.01 0.00 - 0.03 ng/mL   CBC    Collection Time: 08/16/22 10:17 PM   Result Value Ref Range    WBC 8.8 4.8 - 10.8 K/uL    RBC 3.40 (L) 4.20 - 5.40 M/uL    Hemoglobin 9.8 (L) 12.0 - 16.0 g/dL    Hematocrit 31.8 (L) 37.0 - 47.0 %    MCV 93.5 81.0 - 99.0 fL    MCH 28.8 27.0 - 31.0 pg    MCHC 30.8 (L) 33.0 - 37.0 g/dL    RDW 14.5 11.5 - 14.5 %    Platelets 579 274 - 207 K/uL    MPV 9.7 9.4 - 12.3 fL   Comprehensive Metabolic Panel w/ Reflex to MG    Collection Time: 08/16/22 10:17 PM   Result Value Ref Range    Sodium 136 136 - 145 mmol/L    Potassium reflex Magnesium 4.1 3.5 - 5.0 mmol/L    Chloride 97 (L) 98 - Negative    pH, UA 6.5 5.0 - 8.0    Protein, UA Negative Negative mg/dL    Urobilinogen, Urine 1.0 <2.0 E.U./dL    Nitrite, Urine Negative Negative    Leukocyte Esterase, Urine Negative Negative   Eosinophil Smear, Urine    Collection Time: 08/17/22  1:26 AM   Result Value Ref Range    Eosinophil, Ur Insuf    Osmolality, Urine    Collection Time: 08/17/22  1:26 AM   Result Value Ref Range    Osmolality, Ur 380 250 - 1200 mOsm/kg   Sodium, urine, random    Collection Time: 08/17/22  1:26 AM   Result Value Ref Range    Sodium, Ur 68.0 mmol/L   Creatinine, Random Urine    Collection Time: 08/17/22  1:26 AM   Result Value Ref Range    Creatinine, Ur 91.4 4.2 - 622.0 mg/dL   Basic Metabolic Panel w/ Reflex to MG    Collection Time: 08/17/22  6:33 AM   Result Value Ref Range    Sodium 141 136 - 145 mmol/L    Potassium reflex Magnesium 4.2 3.5 - 5.0 mmol/L    Chloride 105 98 - 111 mmol/L    CO2 26 22 - 29 mmol/L    Anion Gap 10 7 - 19 mmol/L    Glucose 97 74 - 109 mg/dL    BUN 46 (H) 8 - 23 mg/dL    Creatinine 1.7 (H) 0.5 - 0.9 mg/dL    GFR Non-African American 29 (A) >60    GFR  36 (L) >59    Calcium 9.0 8.8 - 10.2 mg/dL        I/O last 3 completed shifts: In: 945.6 [P.O.:300;  I.V.:645.6]  Out: 450 [Urine:450]     calcitRIOL  0.25 mcg Oral Daily    escitalopram  20 mg Oral Daily    ferrous gluconate  324 mg Oral BID WC    gabapentin  300 mg Oral TID    hydrocortisone   Topical BID    montelukast  10 mg Oral Daily    therapeutic multivitamin-minerals  1 tablet Oral Daily    pantoprazole  40 mg Oral BID    rosuvastatin  5 mg Oral Daily    potassium chloride  10 mEq Oral Daily    sucralfate  1 g Oral 4 times per day    ipratropium  0.5 mg Nebulization 4x daily    sodium chloride flush  5-40 mL IntraVENous 2 times per day    heparin (porcine)  5,000 Units SubCUTAneous 3 times per day    [START ON 8/18/2022] levothyroxine  75 mcg Oral Daily          Assessment & Plan   MIREYA (Cr baseline 1.3 now at 1.9) on CKD stage 3B (baseline GFR 40) withOUT hyperkalemia (Potassium 4.1 PoA): Likely due to use of diuretics at home  Admit to [address] [place], Nephrology area preferred if available  Strict Is and Os  Daily Weights  Added on to UA Urine OSM/EOS/Na/Cr  Avoid Nephrotoxins as able: NSAIDs/ACEi/ARB/Diuretic/Aminoglycosides/IodinatedContrast etc  Renal US in AM  Nephrology Consultation in AM  IVF with NS, to run at 100cc/h  Will obtain orthostatic vitals to guide IV fluid, will hold Bumex    Hypothyroidism reviewed TSH to be low and FT4 is high reduce the dose of levothyroxine from 100 mcg to 75 mcg need to follow-up with the thyroid panel in 4 to 5 weeks     Comorbid condition  Mood disorder on escitalopram  Peripheral neuropathy on gabapentin  GERD on pantoprazole and sucralfate  Dyslipidemia on rosuvastatin  DVT prophylaxis: Heparin    POA  Full Code No additional code details  Case d/w the RN taking care of the patient  RN  notes reviewed  Consultant notes reviewed     DISPOSITION:    D/C: Home  Estimated D/C Date: 8/20      Bernard Brantley MD 8/17/2022 7:41 PM

## 2022-08-18 NOTE — DISCHARGE SUMMARY
Discharge Summary    NAME: Manoj Henley  :  1950  MRN:  [id number]    Admit date:  2022  Discharge date:      Admitting Physician:  No admitting provider for patient encounter. Advance Directive: Full Code    Consults: nephrology    Primary Care Physician:  Chirstopher Alcazar MD    DISCHARGE DIAGNOSES:  MIREYA (Cr baseline 1.3 now at 1.9) on CKD stage 3B (baseline GFR 40) withOUT hyperkalemia (Potassium 4.1 PoA): Likely due to use of diuretics at home  Admit to [address] [place], Nephrology area preferred if available  Strict Is and Os  Daily Weights  Added on to UA Urine OSM/EOS/Na/Cr  Avoid Nephrotoxins as able: NSAIDs/ACEi/ARB/Diuretic/Aminoglycosides/IodinatedContrast etc  Renal US in AM  Nephrology Consultation in AM  IVF with NS, to run at 100cc/h  Will obtain orthostatic vitals to guide IV fluid, will hold Bumex   nephrology input appreciated cleared for discharge and follow-up in 1 week, We will continue on Bumex as needed if body weight is more than 3 pound educate the patient, Orthostatic vitals negative on discharge     Hypothyroidism reviewed TSH to be low and FT4 is high reduce the dose of levothyroxine from 100 mcg to 75 mcg need to follow-up with the thyroid panel in 4 to 5 weeks     Comorbid condition  Mood disorder on escitalopram  Peripheral neuropathy on gabapentin  GERD on pantoprazole and sucralfate  Dyslipidemia on rosuvastatin  History of SLE   history of pulmonary embolism  History of DVT will continue on Coumadin and follow-up with INR clinic for adjustment of Coumadin dose  Postmenopausal osteoporosis> Osteoarthritis with recent left knee joint replacement with ESR of 72 need further follow-up with PCP  Diabetes mellitus type II well-controlled with A1c of 5.7 continue on home medication  Obesity class 2 with BMI of 36    HOSPITAL COURSE:  Mrs. Manoj Henley is a pleasant 67year old  lady from home.  She states that she had been having low Bps at home \"right after lisinopril. She never had that happen with lisinopril before. She passed out when in the living room and hit the floor. 8/17 seen and evaluated at bedside she is feeling better after getting treatment since admission no acute complaint of nausea vomiting diarrhea, no fever or chills, no lightheadedness, discussed with RN at the bedside to obtain orthostatic vitals to guide further IV fluids. 8/18 seen and evaluated at bedside reviewed the orthostatic vitals with the patient which is negative, she is not feeling any dizziness, palpitation or lightheadedness, discussed about checking body weight every day and taking Bumex 1 mg if body weight is more than 3 pound, discussed with RN to get  arrange for weighing scale. Follow-up with PCP for further adjustment of medication and follow-up with INR       Physical Exam:  Vital Signs: BP (!) 152/79   Pulse 63   Temp 98.1 °F (36.7 °C) (Oral)   Resp 16   Ht 5' 7\" (1.702 m)   Wt 234 lb 1.6 oz (106.2 kg)   SpO2 99%   BMI 36.67 kg/m²   General appearance:. Alert and Cooperative   HEENT: Normocephalic. Chest: clear to auscultation bilaterally without wheezes or rhonchi. Cardiac: Normal heart tones with regular rate and rhythm, S1, S2 normal. No murmurs, gallops, or rubs auscultated. Abdomen:soft, non-tender; normal bowel sounds, no masses, no organomegaly. Extremities: No clubbing or cyanosis. No peripheral edema. Peripheral pulses palpable. Neurologic: Grossly intact. Significant Diagnostic Studies:  All the imaging reviewed    Pertinent Labs: Personally reviewed  CBC:   Recent Labs     08/16/22 2217 08/18/22 0316   WBC 8.8 6.9   HGB 9.8* 8.0*    246     BMP:    Recent Labs     08/16/22 2217 08/17/22  0633 08/18/22 0316    141 139   K 4.1 4.2 4.1   CL 97* 105 108   CO2 26 26 22   BUN 49* 46* 36*   CREATININE 1.9* 1.7* 1.4*   GLUCOSE 90 97 93     INR:   Recent Labs     08/16/22 2217   INR 1.58*     Procedures: None    Discharge these medications      potassium chloride 10 MEQ extended release tablet  Commonly known as: KLOR-CON            ASK your doctor about these medications      aspirin 81 MG tablet     baclofen 10 MG tablet  Commonly known as: LIORESAL  Take 1 tablet by mouth 3 times daily As needed for hip pain     calcipotriene 0.005 % cream  Commonly known as: DOVONEX  Use topically as needed     ferrous gluconate 240 (27 Fe) MG tablet  Commonly known as: Fergon  Take 1 tablet by mouth twice daily     lisinopril 40 MG tablet  Commonly known as: PRINIVIL;ZESTRIL  Take 1 tablet by mouth daily Take 1 tablet by mouth once daily     pantoprazole 40 MG tablet  Commonly known as: PROTONIX  TAKE 1 TABLET BY MOUTH TWICE DAILY BEFORE MEAL(S)     warfarin 7.5 MG tablet  Commonly known as: COUMADIN  Take as directed. If you are unsure how to take this medication, talk to your nurse or doctor. Original instructions: 7.5mg on Monday/Wed/Fri and 15mg all other days               Where to Get Your Medications        You can get these medications from any pharmacy    Bring a paper prescription for each of these medications  bumetanide 2 MG tablet  levothyroxine 75 MCG tablet         Discharge Instructions: Follow up with Yue Drake MD in 7 days. Take medications as directed. Resume activity as tolerated. Diet: ADULT DIET; Regular; 4 carb choices (60 gm/meal); Low Fat/Low Chol/High Fiber/2 gm Na; Low Sodium (2 gm); Low Potassium (Less than 3000 mg/day); Low Phosphorus (Less than 1000 mg)     Disposition: Patient is medically stable and will be discharged home home. Time spent on discharge 40 minutes.     Signed:  Lazaro Ga MD  8/18/2022 12:42 PM

## 2022-08-18 NOTE — PROGRESS NOTES
Patient opting to not wait for inpatient staff to obtain hospital follow-up appointment for her discharge. She states she will call Dr. Meryle Gentle office and request a one-week follow-up appointment when she gets home. Family is at the bedside and agreeable as well.      Electronically signed by Jag Hayes RN on 8/18/2022 at 1:42 PM

## 2022-08-18 NOTE — CARE COORDINATION
Called and spoke with pts annette Dominguez. Informed her that MD was wanting pt to have a scale at home in order to be able to weigh herself daily. Daughter will be able to afford to get her a scale for home. Will also discuss the [facility] program, this would be a free service that could also help assist pt with any further needs.    Electronically signed by Vickie Morejon RN on 8/18/2022 at 10:57 AM

## 2022-08-18 NOTE — PROGRESS NOTES
Physical Therapy  Name: Clara Carlton  MRN:  [id number]  Date of service:  8/18/2022 08/18/22 1567   Restrictions/Precautions   Restrictions/Precautions Fall Risk   Subjective   Subjective Pt agreed to therapy. Pain Assessment   Pain Assessment None - Denies Pain   Bed Mobility   Supine to Sit Modified independent   Transfers   Sit to Stand Supervision   Stand to sit Supervision   Ambulation   Device Rolling Jamie Janus   Assistance Supervision   Quality of Gait no LOB, steady   Distance 10' 350'   Other Activities   Comment amb into bathroom, independent with hygiene and hand washing. Short Term Goals   Time Frame for Short term goals 14 DAYS   Short term goal 1 BED MOB INDEPENDENT   Short term goal 2 TRANSFERS SUP-IND   Short term goal 3 ' RW SUP-IND   Conditions Requiring Skilled Therapeutic Intervention   Body Structures, Functions, Activity Limitations Requiring Skilled Therapeutic Intervention Decreased functional mobility ; Decreased balance   Assessment Pt able to amb increased distance this treatment and doing well with mobility. Steady overall and did not report increased pain or fatigue with mobility. States she is amb hallway with family member also. Up to chair with all needs in reach. See pt 1-2 more times to ensure safety. Activity Tolerance   Activity Tolerance Patient tolerated treatment well   PT Plan of Care   Thursday X   Safety Devices   Type of Devices Call light within reach; Left in chair       Electronically signed by Cassie Rod, KEO on 8/18/2022 at 9:50 AM

## 2022-08-18 NOTE — DISCHARGE INSTRUCTIONS
Your weight today is 234.1 lbs. Weight yourself every morning after emptying for bladder and bowels. If you have a weight gain of 3lb or more, take 1mg of Bumex.     Follow-up with Coumadin clinic for INR check in next 3 days

## 2022-08-19 ENCOUNTER — CARE COORDINATION (OUTPATIENT)
Dept: CASE MANAGEMENT | Age: 72
End: 2022-08-19

## 2022-08-19 ENCOUNTER — TELEPHONE (OUTPATIENT)
Dept: INTERNAL MEDICINE | Age: 72
End: 2022-08-19

## 2022-08-19 LAB — INR BLD: 0.9

## 2022-08-19 NOTE — TELEPHONE ENCOUNTER
[facility] Transitions Initial Follow Up Call    Outreach made within 2 business days of discharge: Yes    Patient: Juan Larios   Patient : 1950 MRN: [id number]    Reason for Admission: There are no discharge diagnoses documented for the most recent discharge. Discharge Date: 22      DISCHARGE DIAGNOSES:    MIREYA (Cr baseline 1.3 now at 1.9) on CKD stage 3B (baseline GFR 40) withOUT hyperkalemia     Spoke with: Patient    Discharge department/facility: [address] Interactive Patient Contact:  Was patient able to fill all prescriptions: Yes  Was patient instructed to bring all medications to the follow-up visit: Yes  Is patient taking all medications as directed in the discharge summary? Yes  Does patient understand their discharge instructions: Yes  Does patient have questions or concerns that need addressed prior to 7-14 day follow up office visit: no    Pt states she is doing better, she is eating and drinking ok. She does not know if she got any new medications. She states her daughter is going to help with that. She has not had a BM since discharge she said that that happens sometimes with her iron pill that she takes. I did let her know she could try some Miralax or one of the OTC medications to help if she needed it. PT is scheduled for  for a follow up.     Scheduled appointment with PCP within 7-14 days    Follow Up  Future Appointments   Date Time Provider Jose Shea   2022  9:00 AM CHANELLE Douglas [facility]   2022 10:00 AM SCHEDULE, L MED ONC MA L MED ONC Shannan BOOTH   2022 10:15 AM CHANELLE Willson [facility]   10/5/2022  9:30 AM Melissa Tubbs MD [facility]       [place], [place]

## 2022-08-19 NOTE — CARE COORDINATION
available to patient including: PCP  Specialist. The patient agrees to contact the PCP office for questions related to their healthcare. Medication reconciliation was performed with  not done , who verbalizes understanding of administration of home medications. Advised obtaining a 90-day supply of all daily and as-needed medications. Was patient discharged with a pulse oximeter? Has her own    LPN CC provided contact information. Plan for follow-up call in 5-7 days based on severity of symptoms and risk factors.   Plan for next call: symptom management-weakness,ha  medication management-medication review      Non-face-to-face services provided:  Education of patient/family/caregiver/guardian to support self-management-     Care Transitions 24 Hour Call    Do you have all of your prescriptions and are they filled?: Yes  Patient DME: Straight cane  Patient Home Equipment: CPAP  Do you have support at home?: Child, Grandchild  Are you an active caregiver in your home?: No  Care Transitions Interventions         Follow Up  Future Appointments   Date Time Provider Jose Shea   8/24/2022  9:00 AM CHANELLE Vance [facility]   9/23/2022 10:00 AM SCHEDULE, L MED ONC MA L MED ONC Shannan [facility]   9/23/2022 10:15 AM CHANELLE Lamb PAD [facility]ON[facility]   10/5/2022  9:30 AM MD HIPOLITO Blunt [facility]-PIEDAD Thompson LPN

## 2022-08-20 DIAGNOSIS — R12 CHRONIC HEARTBURN: ICD-10-CM

## 2022-08-20 DIAGNOSIS — K52.9 GASTROENTERITIS: ICD-10-CM

## 2022-08-20 DIAGNOSIS — K21.9 GASTROESOPHAGEAL REFLUX DISEASE WITHOUT ESOPHAGITIS: ICD-10-CM

## 2022-08-22 ENCOUNTER — ANTI-COAG VISIT (OUTPATIENT)
Dept: INTERNAL MEDICINE | Age: 72
End: 2022-08-22
Payer: MEDICARE

## 2022-08-22 DIAGNOSIS — Z79.01 ANTICOAGULATED ON COUMADIN: Primary | ICD-10-CM

## 2022-08-22 PROCEDURE — 99999 PR OFFICE/OUTPT VISIT,PROCEDURE ONLY: CPT | Performed by: INTERNAL MEDICINE

## 2022-08-22 PROCEDURE — 93793 ANTICOAG MGMT PT WARFARIN: CPT | Performed by: INTERNAL MEDICINE

## 2022-08-22 NOTE — PATIENT INSTRUCTIONS
Lorraine Graham MD  You Just now (4:18 PM)     MF  Take 15 mg today and tomorrow, then follow prior instructions   Repeat next week on 8/30/2022

## 2022-08-22 NOTE — PROGRESS NOTES
Spoke to the pt she VU the below instructions.   Alycia Ferrari MD  You Just now (4:18 PM)     MF  Take 15 mg today and tomorrow, then follow prior instructions   Repeat next week on 8/30/2022

## 2022-08-22 NOTE — PROGRESS NOTES
HOME MONITORING REPORT    INR today:   Results for orders placed or performed in visit on 08/22/22   Protime-INR   Result Value Ref Range    INR 0.90        INR Goal: 2.0-3.0    Dosing Plan  As of 8/22/2022      TTR:  28.0 % (4.3 y)   Full warfarin instructions:  7.5 mg every Mon, Wed, Fri; 15 mg all other days                PLAN:

## 2022-08-22 NOTE — PROGRESS NOTES
Spoke to the pt and she said her INR was at 7.3 on week and then it was 4.8 she went into the hospital and they had been giving her heprin shots and since she has been home she has not been on her coumadin they did not tell her on discharge what to do about her coumadin.

## 2022-08-23 RX ORDER — PANTOPRAZOLE SODIUM 40 MG/1
TABLET, DELAYED RELEASE ORAL
Qty: 180 TABLET | Refills: 1 | Status: SHIPPED | OUTPATIENT
Start: 2022-08-23

## 2022-08-25 ENCOUNTER — CARE COORDINATION (OUTPATIENT)
Dept: CASE MANAGEMENT | Age: 72
End: 2022-08-25

## 2022-08-25 RX ORDER — QUINIDINE GLUCONATE 324 MG
TABLET, EXTENDED RELEASE ORAL
Qty: 180 TABLET | Refills: 0 | Status: CANCELLED | OUTPATIENT
Start: 2022-08-25

## 2022-08-26 DIAGNOSIS — E11.21 TYPE II DIABETES MELLITUS WITH NEPHROPATHY (HCC): Primary | ICD-10-CM

## 2022-08-26 DIAGNOSIS — Z91.09 ENVIRONMENTAL ALLERGIES: ICD-10-CM

## 2022-08-26 LAB — INR BLD: 1.7

## 2022-08-26 RX ORDER — FEXOFENADINE HCL 180 MG/1
TABLET ORAL
Qty: 90 TABLET | Refills: 0 | OUTPATIENT
Start: 2022-08-26

## 2022-08-26 RX ORDER — FEXOFENADINE HCL 180 MG/1
TABLET ORAL
Qty: 90 TABLET | Refills: 2 | Status: SHIPPED | OUTPATIENT
Start: 2022-08-26

## 2022-08-26 NOTE — TELEPHONE ENCOUNTER
Valentina Hermosillo called to request a refill on her medication.       Last office visit : 6/28/2022   Next office visit : 8/30/2022     Requested Prescriptions     Pending Prescriptions Disp Refills    Semaglutide,0.25 or 0.5MG/DOS, 2 MG/1.5ML SOPN 3 pen 2     Sig: Inject 0.25 mg into the skin once a week    fexofenadine (ALLEGRA) 180 MG tablet 90 tablet 2     Sig: Take 1 tablet by mouth once daily            Christen Zuniga MA

## 2022-08-29 ENCOUNTER — ANTI-COAG VISIT (OUTPATIENT)
Dept: INTERNAL MEDICINE | Age: 72
End: 2022-08-29
Payer: MEDICARE

## 2022-08-29 ENCOUNTER — OFFICE VISIT (OUTPATIENT)
Dept: CARDIOLOGY | Facility: CLINIC | Age: 72
End: 2022-08-29

## 2022-08-29 ENCOUNTER — CLINICAL SUPPORT NO REQUIREMENTS (OUTPATIENT)
Dept: CARDIOLOGY | Facility: CLINIC | Age: 72
End: 2022-08-29

## 2022-08-29 VITALS
DIASTOLIC BLOOD PRESSURE: 78 MMHG | HEART RATE: 61 BPM | BODY MASS INDEX: 36.57 KG/M2 | HEIGHT: 67 IN | SYSTOLIC BLOOD PRESSURE: 132 MMHG | WEIGHT: 233 LBS | OXYGEN SATURATION: 98 %

## 2022-08-29 DIAGNOSIS — Z95.0 PACEMAKER: ICD-10-CM

## 2022-08-29 DIAGNOSIS — I49.5 SSS (SICK SINUS SYNDROME): ICD-10-CM

## 2022-08-29 DIAGNOSIS — Z79.01 ANTICOAGULATED ON COUMADIN: Primary | ICD-10-CM

## 2022-08-29 DIAGNOSIS — I49.5 SSS (SICK SINUS SYNDROME): Primary | ICD-10-CM

## 2022-08-29 DIAGNOSIS — I47.1 PAROXYSMAL SVT (SUPRAVENTRICULAR TACHYCARDIA): ICD-10-CM

## 2022-08-29 DIAGNOSIS — E66.01 CLASS 2 SEVERE OBESITY DUE TO EXCESS CALORIES WITH SERIOUS COMORBIDITY AND BODY MASS INDEX (BMI) OF 36.0 TO 36.9 IN ADULT: ICD-10-CM

## 2022-08-29 DIAGNOSIS — I10 ESSENTIAL HYPERTENSION: ICD-10-CM

## 2022-08-29 DIAGNOSIS — I48.0 PAROXYSMAL ATRIAL FIBRILLATION: ICD-10-CM

## 2022-08-29 PROBLEM — I47.10 PAROXYSMAL SVT (SUPRAVENTRICULAR TACHYCARDIA): Status: ACTIVE | Noted: 2022-08-29

## 2022-08-29 LAB — INR BLD: 2.3

## 2022-08-29 PROCEDURE — 93793 ANTICOAG MGMT PT WARFARIN: CPT | Performed by: INTERNAL MEDICINE

## 2022-08-29 PROCEDURE — 99999 PR OFFICE/OUTPT VISIT,PROCEDURE ONLY: CPT | Performed by: INTERNAL MEDICINE

## 2022-08-29 PROCEDURE — 93000 ELECTROCARDIOGRAM COMPLETE: CPT | Performed by: NURSE PRACTITIONER

## 2022-08-29 PROCEDURE — 93288 INTERROG EVL PM/LDLS PM IP: CPT | Performed by: INTERNAL MEDICINE

## 2022-08-29 PROCEDURE — 99214 OFFICE O/P EST MOD 30 MIN: CPT | Performed by: NURSE PRACTITIONER

## 2022-08-29 RX ORDER — BUMETANIDE 0.5 MG/1
0.5 TABLET ORAL DAILY
COMMUNITY

## 2022-08-29 RX ORDER — OXYCODONE HYDROCHLORIDE AND ACETAMINOPHEN 5; 325 MG/1; MG/1
1 TABLET ORAL EVERY 4 HOURS PRN
COMMUNITY
Start: 2022-07-19

## 2022-08-29 RX ORDER — LEVOTHYROXINE SODIUM 0.07 MG/1
75 TABLET ORAL DAILY
COMMUNITY

## 2022-08-29 NOTE — PROGRESS NOTES
HOME MONITORING REPORT    INR today:   Results for orders placed or performed in visit on 08/29/22   Protime-INR   Result Value Ref Range    INR 1.70        INR Goal: 2.0-3.0    Dosing Plan  As of 8/29/2022      TTR:  27.8 % (4.3 y)   Full warfarin instructions:  7.5 mg every Mon, Wed, Fri; 15 mg all other days                PLAN: take 15 today, then resume usual dose

## 2022-08-29 NOTE — PROGRESS NOTES
"    Subjective:     Encounter Date:08/29/2022      Patient ID: Veronica Stewart is a 72 y.o. female with SSS s/p pacemaker, PAF, SVT, HTN, and HLD.    Chief Complaint:\"no complaints\"  Heart Problem  This is a chronic problem. The current episode started more than 1 year ago. The problem occurs daily. The problem has been rapidly improving. Pertinent negatives include no chest pain, coughing, rash or weakness.   Atrial Fibrillation  Presents for follow-up visit. Symptoms are negative for chest pain, dizziness, palpitations, shortness of breath, syncope and weakness. The symptoms have been stable. Past medical history includes atrial fibrillation.     Patient presents today for a routine follow up. Patient is followed for SSS s/p dual chamber pacemaker implant in 2009. She has had episodes of PAF and SVT noted on previous device interrogations. She remains anticoagulated with Coumadin which is managed per her PCP.     Today she reports she has been well. She notes chronic, unchanged RIDDLE. She has mild left leg edema due to a total knee replacement in July. She denies bleeding with Coumadin. She has been monitoring her BP since her knee surgery as her BP dropped with SBP in the 80s. She has remained off her Lisinopril for the last several weeks and notes SBP 140s. Her device was interrogated today which revealed 2 episodes of afib with longest lasting 7 hrs, burden <0.1%, 62 episodes of SVT with longest lasting 4 minutes and episodes of RVR with rates up to 180. She denies chest pain and palpitations.     The following portions of the patient's history were reviewed and updated as appropriate: allergies, current medications, past family history, past medical history, past social history, past surgical history and problem list.    Allergies   Allergen Reactions   • Bee Venom Anaphylaxis   • Insect Extract Allergy Skin Test Anaphylaxis     Bee stings   • Percocet [Oxycodone-Acetaminophen] GI Intolerance     Sweating and " vomiting    • Prednisone Other (See Comments)     Passed out and lost all bodily functions   • Tizanidine Hcl Diarrhea and Nausea And Vomiting     Passed out lost control of body functions     • Ultram [Tramadol Hcl] GI Intolerance     Sweating and vomiting    • Hydrocodone-Acetaminophen Nausea And Vomiting     Sweats, weak, nausea and vomiting   • Other GI Intolerance     Opioids-[facility], patient started sweating and vomiting         Current Outpatient Medications:   •  baclofen (LIORESAL) 10 MG tablet, Take 10 mg by mouth As Needed., Disp: , Rfl:   •  Blood Glucose Calibration (True Metrix Level 1) Low solution, , Disp: , Rfl:   •  bumetanide (BUMEX) 0.5 MG tablet, Take 0.5 mg by mouth Daily., Disp: , Rfl:   •  calcipotriene (DOVONEX) 0.005 % cream, Apply  topically to the appropriate area as directed As Needed., Disp: , Rfl:   •  calcitriol (ROCALTROL) 0.25 MCG capsule, Take 0.25 mcg by mouth Daily., Disp: , Rfl:   •  clobetasol (TEMOVATE) 0.05 % cream, Apply  topically 2 (Two) Times a Day., Disp: , Rfl:   •  CloNIDine (CATAPRES) 0.1 MG tablet, Take 0.1 mg by mouth 2 (Two) Times a Day As Needed., Disp: , Rfl:   •  cyanocobalamin 1000 MCG/ML injection, Inject 1,000 mcg into the shoulder, thigh, or buttocks Every 28 (Twenty-Eight) Days., Disp: , Rfl:   •  Diclofenac Sodium 2 % solution, Place 1 applicator on the skin as directed by provider 2 (Two) Times a Day., Disp: , Rfl:   •  escitalopram (LEXAPRO) 20 MG tablet, Take 1 tablet by mouth Daily., Disp: 30 tablet, Rfl: 2  •  ferrous gluconate (FERGON) 240 (27 FE) MG tablet, Take 1 tablet by mouth 2 (Two) Times a Day., Disp: , Rfl:   •  Ferrous Gluconate 239 (27 Fe) MG tablet, Take 1 tablet by mouth 3 (Three) Times a Day., Disp: , Rfl:   •  fexofenadine (ALLEGRA) 180 MG tablet, Take 180 mg by mouth Daily., Disp: , Rfl:   •  gabapentin (NEURONTIN) 100 MG capsule, Take 100 mg by mouth 3 (Three) Times a Day., Disp: , Rfl:   •  levothyroxine (SYNTHROID, LEVOTHROID) 75  MCG tablet, Take 75 mcg by mouth Daily., Disp: , Rfl:   •  lisinopril (PRINIVIL,ZESTRIL) 40 MG tablet, Take 40 mg by mouth Daily. Currently on hold, Disp: , Rfl:   •  montelukast (SINGULAIR) 10 MG tablet, Take 1 tablet by mouth Daily., Disp: , Rfl:   •  Multiple Vitamins-Minerals (MULTIVITAMIN WITH MINERALS) tablet tablet, Take 1 tablet by mouth Daily., Disp: , Rfl:   •  ondansetron (ZOFRAN) 4 MG tablet, Take 4 mg by mouth Daily As Needed for Nausea or Vomiting., Disp: , Rfl:   •  oxyCODONE-acetaminophen (PERCOCET) 5-325 MG per tablet, Take 1 tablet by mouth Every 4 (Four) Hours As Needed. for pain, Disp: , Rfl:   •  pantoprazole (PROTONIX) 40 MG EC tablet, Take 1 tablet by mouth 2 (Two) Times a Day Before Meals., Disp: , Rfl:   •  potassium chloride 10 MEQ CR tablet, Take 10 mEq by mouth Daily. Currently on hold, Disp: , Rfl:   •  pregabalin (LYRICA) 75 MG capsule, Take 75 mg by mouth 3 (Three) Times a Day., Disp: , Rfl:   •  rosuvastatin (CRESTOR) 5 MG tablet, Take 5 mg by mouth Daily., Disp: , Rfl:   •  Semaglutide,0.25 or 0.5MG/DOS, (OZEMPIC) 2 MG/1.5ML solution pen-injector, Inject 0.25 mg under the skin into the appropriate area as directed., Disp: , Rfl:   •  sucralfate (CARAFATE) 1 g tablet, Take 1 g by mouth 4 (Four) Times a Day., Disp: , Rfl:   •  tiotropium (SPIRIVA HANDIHALER) 18 MCG per inhalation capsule, Place 18 mcg into inhaler and inhale Daily., Disp: , Rfl:   •  warfarin (COUMADIN) 7.5 MG tablet, Take 7.5 mg by mouth Daily. Alternating 7.5mg with 15mg, Disp: , Rfl:   •  metoprolol tartrate (LOPRESSOR) 25 MG tablet, Take 1 tablet by mouth 2 (Two) Times a Day., Disp: 180 tablet, Rfl: 3  Past Medical History:   Diagnosis Date   • Anxiety    • Arrhythmia    • Blood clot in vein 05/2018    Hospitalized    • Arben-tachy syndrome (HCC)    • Bradycardia    • Breath shortness    • Burn     left leg   • Cardiac pacemaker     11/09 [facility]   • Chest pain    • Chronic fatigue    • Depression    • Diabetes  mellitus ([facility])    • Difficulty walking 5/17   • Dizziness    • Fatigue    • Fibromyalgia, primary 7/15   • Follow-up exam    • Headache, tension-type 9/2018   • Heart burn    • Hypercoagulable state, primary ([facility])     LUPUS ANTI-COAG   • Hyperlipidemia    • Hypertension    • Liver disease    • Neuropathy in diabetes (HCC) 03/14   • Peripheral neuropathy 03/03   • Shingles 9/2018   • Shortness of breath    • Sleep apnea     complex.  using a c-pap machine   • Stroke ([facility])    • Syncope    • Tachy-redd syndrome ([facility])    • TIA (transient ischemic attack) 04/2003   • Vision loss in jr. high school    lBlind rt. eye       Social History     Socioeconomic History   • Marital status:    Tobacco Use   • Smoking status: Never Smoker   • Smokeless tobacco: Never Used   Vaping Use   • Vaping Use: Never used   Substance and Sexual Activity   • Alcohol use: No   • Drug use: No   • Sexual activity: Not Currently     Partners: Male     Birth control/protection: Abstinence, Condom       Review of Systems   Constitutional: Negative for malaise/fatigue, weight gain and weight loss.   Cardiovascular: Positive for dyspnea on exertion and leg swelling. Negative for chest pain, irregular heartbeat, near-syncope, orthopnea, palpitations, paroxysmal nocturnal dyspnea and syncope.   Respiratory: Negative for cough, shortness of breath, sleep disturbances due to breathing, sputum production and wheezing.    Hematologic/Lymphatic: Does not bruise/bleed easily.   Skin: Negative for dry skin, flushing, itching and rash.   Gastrointestinal: Negative for hematemesis and hematochezia.   Neurological: Negative for dizziness, light-headedness, loss of balance and weakness.   All other systems reviewed and are negative.         Objective:     Vitals reviewed.   Constitutional:       General: Not in acute distress.     Appearance: Healthy appearance. Well-developed. Not diaphoretic.   Eyes:      General: No scleral icterus.      "Conjunctiva/sclera: Conjunctivae normal.      Pupils: Pupils are equal, round, and reactive to light.   HENT:      Head: Normocephalic.    Mouth/Throat:      Pharynx: No oropharyngeal exudate.   Neck:      Vascular: No JVR.   Pulmonary:      Effort: Pulmonary effort is normal. No respiratory distress.      Breath sounds: Normal breath sounds. No wheezing. No rhonchi. No rales.   Chest:      Chest wall: Not tender to palpatation.   Cardiovascular:      Normal rate. Regular rhythm.   Pulses:     Intact distal pulses.   Edema:     Peripheral edema absent.   Abdominal:      General: Bowel sounds are normal. There is no distension.      Palpations: Abdomen is soft.      Tenderness: There is no abdominal tenderness.   Musculoskeletal: Normal range of motion.      Cervical back: Normal range of motion and neck supple. Skin:     General: Skin is warm and dry.      Coloration: Skin is not pale.      Findings: No erythema or rash.   Neurological:      Mental Status: Alert, oriented to person, place, and time and oriented to person, place and time.      Deep Tendon Reflexes: Reflexes are normal and symmetric.   Psychiatric:         Behavior: Behavior normal.             ECG 12 Lead    Date/Time: 8/29/2022 3:26 PM  Performed by: Marie Mendez APRN  Authorized by: Marie Mendez APRN   Comparison: compared with previous ECG from 8/16/2021  Comparison to previous ECG: Atrial paced rhythm has replaced NSR  Rhythm: paced  Rate: normal  BPM: 61  QRS axis: normal  Pacing capture: atrial paced rhythm.  Clinical impression: abnormal EKG          /78   Pulse 61   Ht 170.2 cm (67\")   Wt 106 kg (233 lb)   SpO2 98%   BMI 36.49 kg/m²     Lab Review:   I have reviewed previous office notes, device interrogations, recent labs and recent cardiac testing.     Lab Results   Component Value Date    CHLPL 224 (H) 04/11/2022    TRIG 84 04/11/2022    HDL 95 04/11/2022     04/11/2022     Results for orders placed during the " hospital encounter of 09/27/21    Adult Transthoracic Echo Complete w/ Color, Spectral and Contrast if necessary per protocol    Interpretation Summary  · Left ventricular wall thickness is consistent with mild concentric hypertrophy.  · Estimated left ventricular EF = 55% Left ventricular systolic function is normal.  · Left ventricular diastolic function is consistent with (grade I) impaired relaxation and age.  · The right atrial cavity is mild to moderately dilated.  · Moderate tricuspid valve regurgitation is present.  · Estimated right ventricular systolic pressure from tricuspid regurgitation is moderately elevated (45-55 mmHg).  · Moderate pulmonary hypertension is present.          Assessment:          Diagnosis Plan   1. SSS (sick sinus syndrome) ([facility])     2. Pacemaker     3. Essential hypertension     4. Class 2 severe obesity due to excess calories with serious comorbidity and body mass index (BMI) of 36.0 to 36.9 in adult ([facility])            Plan:       1. SSS- stable. S/p pacemaker.   2. Pacemaker- normal device function per interrogation today with episodes of PAF and SVT.   3. PAF- 2 episodes noted per device interrogation today with longest lasting 7hrs, burden <0.1%. some RVR with rates 180. Will start lopressor 25mg BID to help control ventricular rates. Anticoagulated with Coumadin. INR followed by PCP.  [id number] Score: 6  4. PSVT- 62 episodes with longest lasting 4 mins. Will start lopressor to help suppress arrhythmia.   5. HTN- controlled. Recent issues with hypotension. Currently not on antihypertensive. Followed by PCP.   6. BMI- Class 2 Severe Obesity (BMI >=35 and <=39.9). Obesity-related health conditions include the following: hypertension, dyslipidemias and lower extremity venous stasis disease. Obesity is unchanged. BMI is is above average; BMI management plan is completed. We discussed portion control and increasing exercise.        Follow up in 1 year or sooner if symptoms worsen.      I spent 30 minutes caring for Veronica on this date of service. This time includes time spent by me in the following activities:preparing for the visit, reviewing tests, obtaining and/or reviewing a separately obtained history, performing a medically appropriate examination and/or evaluation , counseling and educating the patient/family/caregiver, ordering medications, tests, or procedures, documenting information in the medical record, independently interpreting results and communicating that information with the patient/family/caregiver and care coordination

## 2022-08-30 ENCOUNTER — OFFICE VISIT (OUTPATIENT)
Dept: INTERNAL MEDICINE | Age: 72
End: 2022-08-30
Payer: MEDICARE

## 2022-08-30 ENCOUNTER — CARE COORDINATION (OUTPATIENT)
Dept: CASE MANAGEMENT | Age: 72
End: 2022-08-30

## 2022-08-30 ENCOUNTER — TELEPHONE (OUTPATIENT)
Dept: INTERNAL MEDICINE | Age: 72
End: 2022-08-30

## 2022-08-30 VITALS
OXYGEN SATURATION: 97 % | SYSTOLIC BLOOD PRESSURE: 132 MMHG | WEIGHT: 235.6 LBS | BODY MASS INDEX: 36.98 KG/M2 | HEIGHT: 67 IN | DIASTOLIC BLOOD PRESSURE: 78 MMHG | HEART RATE: 72 BPM

## 2022-08-30 DIAGNOSIS — E03.9 HYPOTHYROIDISM, UNSPECIFIED TYPE: ICD-10-CM

## 2022-08-30 DIAGNOSIS — N17.9 AKI (ACUTE KIDNEY INJURY) (HCC): ICD-10-CM

## 2022-08-30 DIAGNOSIS — R55 SYNCOPE, UNSPECIFIED SYNCOPE TYPE: ICD-10-CM

## 2022-08-30 DIAGNOSIS — Z86.2 H/O HYPERCOAGULABLE STATE: ICD-10-CM

## 2022-08-30 DIAGNOSIS — R82.90 FOUL SMELLING URINE: ICD-10-CM

## 2022-08-30 DIAGNOSIS — Z09 HOSPITAL DISCHARGE FOLLOW-UP: ICD-10-CM

## 2022-08-30 DIAGNOSIS — J44.9 CHRONIC OBSTRUCTIVE PULMONARY DISEASE, UNSPECIFIED COPD TYPE (HCC): ICD-10-CM

## 2022-08-30 DIAGNOSIS — N17.9 AKI (ACUTE KIDNEY INJURY) (HCC): Primary | ICD-10-CM

## 2022-08-30 DIAGNOSIS — Z86.718 HISTORY OF DVT (DEEP VEIN THROMBOSIS): ICD-10-CM

## 2022-08-30 DIAGNOSIS — K21.9 GASTROESOPHAGEAL REFLUX DISEASE WITHOUT ESOPHAGITIS: ICD-10-CM

## 2022-08-30 DIAGNOSIS — I47.1 PAROXYSMAL SVT (SUPRAVENTRICULAR TACHYCARDIA) (HCC): ICD-10-CM

## 2022-08-30 DIAGNOSIS — E11.21 TYPE II DIABETES MELLITUS WITH NEPHROPATHY (HCC): ICD-10-CM

## 2022-08-30 DIAGNOSIS — E53.8 B12 DEFICIENCY: ICD-10-CM

## 2022-08-30 DIAGNOSIS — Z96.651 HISTORY OF TOTAL KNEE ARTHROPLASTY, RIGHT: ICD-10-CM

## 2022-08-30 DIAGNOSIS — I95.9 HYPOTENSION, UNSPECIFIED HYPOTENSION TYPE: ICD-10-CM

## 2022-08-30 DIAGNOSIS — E11.49 OTHER DIABETIC NEUROLOGICAL COMPLICATION ASSOCIATED WITH TYPE 2 DIABETES MELLITUS (HCC): ICD-10-CM

## 2022-08-30 PROBLEM — I48.0 PAROXYSMAL ATRIAL FIBRILLATION (HCC): Status: ACTIVE | Noted: 2022-08-29

## 2022-08-30 PROBLEM — I47.10 PAROXYSMAL SVT (SUPRAVENTRICULAR TACHYCARDIA): Status: ACTIVE | Noted: 2022-08-29

## 2022-08-30 PROBLEM — I49.5 SSS (SICK SINUS SYNDROME) (HCC): Status: ACTIVE | Noted: 2022-08-29

## 2022-08-30 PROCEDURE — 1111F DSCHRG MED/CURRENT MED MERGE: CPT | Performed by: INTERNAL MEDICINE

## 2022-08-30 PROCEDURE — 96372 THER/PROPH/DIAG INJ SC/IM: CPT | Performed by: INTERNAL MEDICINE

## 2022-08-30 PROCEDURE — 99495 TRANSJ CARE MGMT MOD F2F 14D: CPT | Performed by: INTERNAL MEDICINE

## 2022-08-30 RX ORDER — CEFADROXIL 500 MG/1
CAPSULE ORAL
COMMUNITY
Start: 2022-07-19 | End: 2022-08-30 | Stop reason: ALTCHOICE

## 2022-08-30 RX ORDER — QUINIDINE GLUCONATE 324 MG
TABLET, EXTENDED RELEASE ORAL
Qty: 180 TABLET | Refills: 0 | Status: SHIPPED | OUTPATIENT
Start: 2022-08-30

## 2022-08-30 RX ORDER — CYANOCOBALAMIN 1000 UG/ML
1000 INJECTION INTRAMUSCULAR; SUBCUTANEOUS ONCE
Status: COMPLETED | OUTPATIENT
Start: 2022-08-30 | End: 2022-08-30

## 2022-08-30 RX ORDER — OXYCODONE HYDROCHLORIDE AND ACETAMINOPHEN 5; 325 MG/1; MG/1
TABLET ORAL
COMMUNITY
Start: 2022-07-19

## 2022-08-30 RX ORDER — CYANOCOBALAMIN 1000 UG/ML
INJECTION INTRAMUSCULAR; SUBCUTANEOUS
COMMUNITY
Start: 2022-07-06 | End: 2022-08-30 | Stop reason: SDUPTHER

## 2022-08-30 RX ORDER — DOCUSATE SODIUM 100 MG/1
CAPSULE, LIQUID FILLED ORAL
COMMUNITY
Start: 2022-07-19

## 2022-08-30 RX ORDER — QUINIDINE GLUCONATE 324 MG
TABLET, EXTENDED RELEASE ORAL
Qty: 180 TABLET | Refills: 0 | Status: SHIPPED | OUTPATIENT
Start: 2022-08-30 | End: 2022-08-30 | Stop reason: SDUPTHER

## 2022-08-30 RX ADMIN — CYANOCOBALAMIN 1000 MCG: 1000 INJECTION INTRAMUSCULAR; SUBCUTANEOUS at 10:27

## 2022-08-30 NOTE — CARE COORDINATION
[facility] Follow Up Call    2022    Patient: Manoj Henley  Patient : 1950   MRN: [id number]  Reason for Admission:   Discharge Date: 22 RARS: Readmission Risk Score: 16.1         Spoke with: Scottie Christina [place] [facility] Subsequent and Final Call    Subsequent and Final Calls  Do you have any ongoing symptoms?: No  Have your medications changed?: No  Do you have any questions related to your medications?: No  Do you currently have any active services?: No  Do you have any needs or concerns that I can assist you with?: No  Identified Barriers: None  Care Transitions Interventions  Other Interventions: Follow Up : Spoke with patient today for follow up call. She has seen Cardio recently and PCP. Cardiology she says put her on Lopressor to get her heart rate back down under control. It has been in the 180's. She had HFU with PCP and she was taken off Lisinopril as her bp was getting low. She says she tries to eat, not much of an appetite. CTN discussed being purposeful in her diet and chose things that are nutritious and good for her. She says she is going on ,  and  to outpatient physical therapy. Encouraged her to keep her exercises up to regain her strength. She seems a bit down today, CTN trying to encourage her and lift her spirits. No other problems or complaints. Will follow up at a later time.     Future Appointments   Date Time Provider Jose Shea   2022 10:00 AM SCHEDULE, [facility] MED ONC MA L MED ONC Shannan BOOTH   2022 10:15 AM CHANELLE AlmazanONC [facility]   10/5/2022  9:30 AM MD HIPOLITO Zamudio [facility]       Siomara Barrientos RN

## 2022-08-30 NOTE — PROGRESS NOTES
After obtaining consent, and per orders of Dr. Thais Calhoun, injection of B12 given in Left deltoid by Loree Dempsey MA. Patient instructed to remain in clinic for 20 minutes afterwards, and to report any adverse reaction to me immediately.

## 2022-08-30 NOTE — TELEPHONE ENCOUNTER
Granddaughter came back in stating that rx Ferrous gluconate needs to be sent to [facility] instead of [facility].

## 2022-08-30 NOTE — PROGRESS NOTES
Post-Discharge Transitional Care Follow Up    Valentina Hermosillo   YOB: 1950    Date of Office Visit:  8/30/2022  Date of Hospital Admission: 8/16/22  Date of Hospital Discharge:  8/18/22    Care management risk score Rising risk (score 2-5) and Complex Care (Scores >=6): No Risk Score On File     Non face to face  following discharge, date last encounter closed (first attempt may have been earlier): *No documented post hospital discharge outreach found in the last 14 days     Call initiated 2 business days of discharge: *No response recorded in the last 14 days    ASSESSMENT/PLAN:   MIREYA (acute kidney injury) ([facility]  -     CBC with Auto Differential; Future  -     Comprehensive Metabolic Panel; Future  Hypothyroidism, unspecified type  -     TSH; Future  Foul smelling urine  -     Urinalysis with Reflex to Culture; Future  Hypotension, unspecified hypotension type  Chronic obstructive pulmonary disease, unspecified COPD type ([facility])  Paroxysmal SVT (supraventricular tachycardia) ([facility])  Syncope, unspecified syncope type  Type II diabetes mellitus with nephropathy ([facility])  History of total knee arthroplasty, right  Gastroesophageal reflux disease without esophagitis  Other diabetic neurological complication associated with type 2 diabetes mellitus ([facility]  B12 deficiency  H/O hypercoagulable state  History of DVT (deep vein thrombosis)    Medical Decision Making: moderate complexity  No follow-ups on file. On this date 8/30/2022 I have spent 40 minutes reviewing previous notes, test results and face to face with the patient discussing the diagnosis and importance of compliance with the treatment plan as well as documenting on the day of the visit. Subjective:   HPI    Inpatient course: Discharge summary reviewed- see chart. Interval history/Current status: Ms. Gurmeet Jimenez is a 65-year-old woman who presents to the office today for hospital follow-up.   Presented to the emergency room on the day of admission with complaints of low blood pressure. Apparently, she had an episode of syncope and passed out while at home. She and her granddaughter are very poor historians. She was seen and evaluated at [facility].  She was admitted and given IV fluids for acute kidney injury. She denies any complaints of nausea vomiting or diarrhea. It was felt that she most likely had acute kidney injury secondary to diuretic therapy and tight hypertensive medication. This patient does have a known history of noncompliance with medication therapy. Her daughter is with her today. Her daughter is making sure that she is taking her medications as prescribed. Her blood pressures currently well controlled today. She denies any complaints of chest pain, chest pressure or lower extremity swelling. Her dose of her thyroid medication was recently adjusted while she was hospitalized. She has not been checking her blood sugars. She would like a B12 injection today. She recently had left knee replacement surgery in [place]. She continues to follow-up with her orthopedic surgeon.     Patient Active Problem List   Diagnosis    Gastroesophageal reflux disease    History of gastric bypass    Family history of colon cancer    Fibromyalgia    Encounter for current long-term use of anticoagulants    Primary osteoarthritis of right knee    Arthritis of knee    Hypertensive disorder    Hyperglycemia    Complex sleep apnea syndrome    Iron deficiency anemia    Restrictive airway disease    DVT, lower extremity, proximal, acute, unspecified laterality ([address]    History of ulcer disease    Anticoagulated on Coumadin    Postmenopausal osteoporosis    Diabetes mellitus ([address]    Pacemaker    History of DVT (deep vein thrombosis)    Lupus anticoagulant disorder ([address]    History of pulmonary embolism    Thrombocytopenia (HCC)    Hypothyroidism    Morbid obesity due to excess calories (HCC)    Asthmatic bronchitis without complication    Gastroenteritis Colic    Abdominal pain    Non-intractable vomiting with nausea    Severe episode of recurrent major depressive disorder, without psychotic features ([facility]    Lower abdominal pain    Chronic heartburn    S/P gastric bypass    Dog bite    Cellulitis of right upper extremity    Abscess of right arm    Soft tissue infection    Skin ulcer of upper arm, with fat layer exposed ([address]    History of Gage-en-Y gastric bypass    Hyperlipidemia    Hypersomnia    Nonrheumatic mitral valve regurgitation    Obesity, Class II, BMI 35-39.9    Peripheral edema    Restless legs syndrome (RLS)    Vitamin D deficiency    Chronic deep vein thrombosis (DVT) of proximal vein of lower extremity (HCC)    Unspecified severe protein-calorie malnutrition ([address]    H/O systemic lupus erythematosus (SLE) (HCC)    Type II diabetes mellitus with nephropathy (HCC)    Stable angina (HCC)    Stage 3a chronic kidney disease (HCC)    Chronic renal disease, stage III (HCC) [755708]    Acute kidney injury superimposed on chronic kidney disease (HCC)    Chronic obstructive lung disease (HCC)    Paroxysmal atrial fibrillation (HCC)    Paroxysmal SVT (supraventricular tachycardia) (HCC)    SSS (sick sinus syndrome) ([facility]       Medication list at time of discharge reviewed: Yes    Medications marked \"taking\" at this time  Outpatient Medications Marked as Taking for the 8/30/22 encounter (Office Visit) with Waylon Ren MD   Medication Sig Dispense Refill    docusate sodium (COLACE) 100 MG capsule TAKE 1 CAPSULE BY MOUTH TWICE DAILY AS NEEDED FOR CONSTIPATION      oxyCODONE-acetaminophen (PERCOCET) 5-325 MG per tablet TAKE 1 TABLET BY MOUTH EVERY 4 HOURS AS NEEDED FOR PAIN      [DISCONTINUED] ferrous gluconate (FERGON) 240 (27 Fe) MG tablet Take 1 tablet by mouth twice daily 180 tablet 0    Semaglutide,0.25 or 0.5MG/DOS, 2 MG/1.5ML SOPN Inject 0.25 mg into the skin once a week 3 pen 2    fexofenadine (ALLEGRA) 180 MG tablet Take 1 tablet by mouth once daily 90 tablet 2    pantoprazole (PROTONIX) 40 MG tablet TAKE 1 TABLET TWICE DAILY WITH MEALS 180 tablet 1    bumetanide (BUMEX) 2 MG tablet Take 0.5 tablets by mouth daily as needed (In the body weight is 3 pound over 234 pound( considered as dry weight)) 90 tablet 0    levothyroxine (SYNTHROID) 75 MCG tablet Take 1 tablet by mouth Daily 30 tablet 3    sucralfate (CARAFATE) 1 GM tablet TAKE 1 TABLET FOUR TIMES DAILY 360 tablet 0    rosuvastatin (CRESTOR) 5 MG tablet TAKE 1 TABLET EVERY DAY 90 tablet 3    calcitRIOL (ROCALTROL) 0.25 MCG capsule TAKE 1 CAPSULE EVERY DAY 90 capsule 0    Alcohol Swabs (B-D SINGLE USE SWABS REGULAR) PADS TID E11.21 300 each 3    Blood Glucose Calibration (TRUE METRIX LEVEL 1) Low SOLN TID E11.21 3 each 3    montelukast (SINGULAIR) 10 MG tablet Take 1 tablet by mouth daily 90 tablet 3    Hydrocortisone, Perianal, (PROCTO-CASPER) 1 % cream Apply topically 2 times daily. 1 each 1    gabapentin (NEURONTIN) 300 MG capsule TAKE 1 CAPSULE BY MOUTH THREE TIMES DAILY 270 capsule 0    ondansetron (ZOFRAN) 4 MG tablet Take 1 tablet by mouth every 8 hours as needed for Nausea 30 tablet 1    baclofen (LIORESAL) 10 MG tablet Take 1 tablet by mouth 3 times daily As needed for hip pain (Patient taking differently: Take 10 mg by mouth 3 times daily as needed As needed for hip pain) 30 tablet 1    Cyanocobalamin 1000 MCG/ML KIT Inject 1 mL as directed every 30 days 3 kit 1    calcipotriene (DOVONEX) 0.005 % cream Use topically as needed (Patient taking differently: Apply 1 Dose topically 2 times daily as needed Use topically as needed) 3 each 1    escitalopram (LEXAPRO) 20 MG tablet Take 1 tablet by mouth daily 90 tablet 3    tiotropium (SPIRIVA RESPIMAT) 2.5 MCG/ACT AERS inhaler Inhale 2 puffs into the lungs daily 3 each 1    warfarin (COUMADIN) 7.5 MG tablet 7.5mg on Monday/Wed/Fri and 15mg all other days 180 tablet 3    clobetasol (TEMOVATE) 0.05 % cream Apply topically 2 times daily.  3 each 2    CPAP Machine MISC Inhale 1 each into the lungs nightly      Multiple Vitamins-Minerals (CENTRUM ADULTS) TABS Take 1 tablet by mouth daily          Medications patient taking as of now reconciled against medications ordered at time of hospital discharge: Yes    Review of Systems   Constitutional:  Positive for fatigue. Negative for activity change, appetite change, chills, diaphoresis, fever and unexpected weight change. HENT:  Negative for congestion, ear pain, hearing loss, nosebleeds, postnasal drip, rhinorrhea, sinus pressure, sinus pain, sneezing, sore throat, tinnitus, trouble swallowing and voice change. Eyes:  Negative for discharge and itching. Respiratory:  Negative for apnea, cough, chest tightness, shortness of breath, wheezing and stridor. Cardiovascular:  Positive for leg swelling. Negative for chest pain and palpitations. Left leg swelling; chronic secondary to DVT. Gastrointestinal:  Negative for abdominal distention, abdominal pain, blood in stool, constipation, diarrhea, nausea and vomiting. Endocrine: Negative for cold intolerance, heat intolerance, polydipsia, polyphagia and polyuria. Genitourinary:  Negative for difficulty urinating, dysuria, flank pain, frequency, hematuria and urgency. Foul smelling urine   Musculoskeletal:  Positive for arthralgias and back pain. Negative for gait problem, joint swelling, myalgias, neck pain and neck stiffness. She had bilateral knee pain. Recently had a right total knee replacement. Skin:  Negative for color change, pallor, rash and wound. Allergic/Immunologic: Positive for environmental allergies. Negative for food allergies and immunocompromised state. Neurological:  Negative for dizziness, tremors, seizures, syncope, facial asymmetry, speech difficulty, weakness, light-headedness, numbness and headaches. Numbness and tingling to her lower extremities   Hematological:  Negative for adenopathy.  Does not bruise/bleed easily. Psychiatric/Behavioral:  Positive for dysphoric mood. Negative for agitation, confusion, decreased concentration and hallucinations. The patient is not nervous/anxious and is not hyperactive. Mood has improved     Objective:    /78   Pulse 72   Ht 5' 7\" (1.702 m)   Wt 235 lb 9.6 oz (106.9 kg)   SpO2 97%   BMI 36.90 kg/m²   Physical Exam  Vitals and nursing note reviewed. Constitutional:       General: She is not in acute distress. Appearance: Normal appearance. She is well-developed. She is obese. She is not ill-appearing, toxic-appearing or diaphoretic. HENT:      Head: Normocephalic and atraumatic. Right Ear: Tympanic membrane, ear canal and external ear normal. There is no impacted cerumen. Left Ear: Tympanic membrane, ear canal and external ear normal.      Nose: Nose normal. No congestion or rhinorrhea. Mouth/Throat:      Mouth: Mucous membranes are moist.      Pharynx: Oropharynx is clear. No oropharyngeal exudate or posterior oropharyngeal erythema. Eyes:      General: No scleral icterus. Right eye: No discharge. Left eye: No discharge. Extraocular Movements: Extraocular movements intact. Conjunctiva/sclera: Conjunctivae normal.      Pupils: Pupils are equal, round, and reactive to light. Neck:      Thyroid: No thyromegaly. Vascular: No carotid bruit or JVD. Trachea: No tracheal deviation. Cardiovascular:      Rate and Rhythm: Normal rate and regular rhythm. Pulses: Normal pulses. Heart sounds: Normal heart sounds. No murmur heard. No friction rub. No gallop. Pulmonary:      Effort: Pulmonary effort is normal. No respiratory distress. Breath sounds: Normal breath sounds. No stridor. No wheezing, rhonchi or rales. Chest:      Chest wall: No tenderness. Abdominal:      General: Abdomen is flat. Bowel sounds are normal. There is no distension. Palpations: Abdomen is soft.  There is no mass. Tenderness: There is no abdominal tenderness. There is no right CVA tenderness, left CVA tenderness, guarding or rebound. Hernia: No hernia is present. Musculoskeletal:         General: Tenderness present. No swelling, deformity or signs of injury. Normal range of motion. Cervical back: Normal range of motion. No rigidity. No muscular tenderness. Lumbar back: Spasms present. No swelling or bony tenderness. Normal range of motion. Back:       Right lower leg: No edema. Left lower leg: No edema. Comments:  Does have some hip pain on palpation of the left hip she also has calf pain and tenderness on palpation of the right calf. She does have some edema. There is no erythema noted. Visual inspection:  Deformity/amputation: absent  Skin lesions/pre-ulcerative calluses: present to bottom of the left foot  Edema: right- negative, left- negative    Sensory exam:  Monofilament sensation: normal  (minimum of 5 random plantar locations tested, avoiding callused areas - > 1 area with absence of sensation is + for neuropathy)    Plus at least one of the following:  Pulses: normal,   Pinprick: Intact  Proprioception: N/A  Vibration (128 Hz): N/A   Lymphadenopathy:      Cervical: No cervical adenopathy. Skin:     General: Skin is warm and dry. Capillary Refill: Capillary refill takes less than 2 seconds. Coloration: Skin is not jaundiced or pale. Findings: No bruising, erythema, lesion or rash. Neurological:      General: No focal deficit present. Mental Status: She is alert and oriented to person, place, and time. Mental status is at baseline. Cranial Nerves: No cranial nerve deficit. Sensory: No sensory deficit. Motor: No weakness or abnormal muscle tone.       Coordination: Coordination normal.      Gait: Gait normal.      Deep Tendon Reflexes: Reflexes normal.   Psychiatric:         Mood and Affect: Mood normal. Mood is not anxious or depressed. Behavior: Behavior normal.         Thought Content: Thought content normal.         Judgment: Judgment normal.       65-year-old woman here for follow-up    1. Acute kidney injury, hypotension and syncope: Most likely secondary to diuretics and blood pressure medication. Patient has a known history of not taking her medications as prescribed. At this time, her blood pressure appears to be well controlled. She takes Bumex only as needed. Her lisinopril is currently on hold. We will check a CBC CMP and TSH today. 2.  Hypothyroidism: Her medication was recently adjusted while hospitalized. We will check a TSH today    3. Type 2 diabetes: Patient advised to take her blood sugars regularly. Due to history of diabetic nephropathy: We will check a CMP today    4. Status post right total knee replacement: Care as per Ortho. Continue Percocet as needed    5. Acid reflux: Well-controlled on Protonix. 6.  Diabetic neuropathy: Continue gabapentin as prescribed    7. B12 deficiency: B12 injection given today    8. Hypercoagulability/history of DVT: Continue Coumadin as prescribed    9. She does complain of foul-smelling urine: We will check a urinalysis. She denies any complaints of dysuria    10. History of COPD: Stable    11. History of PSVT: Patient denies any complaints of heart palpitations at this time    An electronic signature was used to authenticate this note.   --Katie Yu MD

## 2022-09-01 NOTE — PROGRESS NOTES
Dual Chamber Pacemaker Evaluation Report  Clinic Interrogation    September 1, 2022    Primary Cardiologist: Mynor  : Medtronic Model: EnRhythm  Implant date: 11/13/09    Reason for evaluation: routine  Indication for pacemaker: sick sinus syndrome    Measurements  Atrial sensing - P wave: 1.9 mV  Atrial threshold: 0.5 V@ 0.4 ms  Atrial lead impedance: 399 ohms  Ventricular sensing - R wave: 2.9 mV  Ventricular threshold: 1.5 V @ 0.4 ms  Ventricular lead impedance:   399 ohms     Diagnostic Data  Atrial paced: 89.1 %  Ventricular paced: 0.9 %  Other: 7 episodes of SVT, longest 9 seconds, fastest 188 bpm.  AF burden <1%, longest 7 h 8 min, fastest 164 bpm.  Battery status: satisfactory   3.01 V      Final Parameters  Mode:  AAIR+  Lower rate: 60 bpm   Upper rate: 130 bpm  AV Delay: paced- 180 ms  Sensed-150 ms  Atrial - Amplitude: 1.5 V   Pulse width: 0.4 ms   Sensitivity: 0.3 mV     Ventricular - Amplitude: 3.0 V  Pulse width: 0.4 ms  Sensitivity: 0.9 mV    Changes made: none  Conclusions: normal pacemaker function, stable pacing and sensing thresholds and adequate battery reserve    Follow up: 6 months via [facility], annually in office

## 2022-09-04 ASSESSMENT — ENCOUNTER SYMPTOMS
ABDOMINAL DISTENTION: 0
SINUS PRESSURE: 0
SORE THROAT: 0
COLOR CHANGE: 0
VOICE CHANGE: 0
RHINORRHEA: 0
CONSTIPATION: 0
DIARRHEA: 0
APNEA: 0
WHEEZING: 0
CHEST TIGHTNESS: 0
VOMITING: 0
COUGH: 0
TROUBLE SWALLOWING: 0
ABDOMINAL PAIN: 0
EYE DISCHARGE: 0
BLOOD IN STOOL: 0
EYE ITCHING: 0
STRIDOR: 0
SINUS PAIN: 0
BACK PAIN: 1
SHORTNESS OF BREATH: 0
NAUSEA: 0

## 2022-09-08 LAB — INR BLD: 2.5

## 2022-09-09 ENCOUNTER — CARE COORDINATION (OUTPATIENT)
Dept: CASE MANAGEMENT | Age: 72
End: 2022-09-09

## 2022-09-09 ENCOUNTER — ANTI-COAG VISIT (OUTPATIENT)
Dept: INTERNAL MEDICINE | Age: 72
End: 2022-09-09
Payer: MEDICARE

## 2022-09-09 DIAGNOSIS — Z79.01 ANTICOAGULATED ON COUMADIN: Primary | ICD-10-CM

## 2022-09-09 PROCEDURE — 99999 PR OFFICE/OUTPT VISIT,PROCEDURE ONLY: CPT | Performed by: INTERNAL MEDICINE

## 2022-09-09 PROCEDURE — 93793 ANTICOAG MGMT PT WARFARIN: CPT | Performed by: INTERNAL MEDICINE

## 2022-09-09 NOTE — CARE COORDINATION
[facility] Transitions Follow Up Call    2022    Patient: Vicente Jimenez  Patient : 1950   MRN: <[id number]>  Reason for Admission: CKD  Discharge Date: 22 RARS: Readmission Risk Score: 16.1    Attempted to reach pt for follow up call. Left message requesting call back. Care Transitions Subsequent and Final Call    Subsequent and Final Calls  Care Transitions Interventions  Other Interventions:              Follow Up  Future Appointments   Date Time Provider Jose Shea   2022 10:00 AM SCHEDULE, L MED ONC MA L MED ONC [facility]   2022 10:15 AM [facility]   10/5/2022  9:30 AM [facility]       Arsenio Book

## 2022-09-09 NOTE — PROGRESS NOTES
HOME MONITORING REPORT    INR today:   Results for orders placed or performed in visit on 09/09/22   Protime-INR   Result Value Ref Range    INR 2.30    Protime-INR   Result Value Ref Range    INR 2.50        INR Goal: 2.0-3.0    Dosing Plan  As of 9/9/2022      TTR:  28.4 % (4.3 y)   Full warfarin instructions:  7.5 mg every Mon, Wed, Fri; 15 mg all other days                PLAN: Advised patient/caregiver to continue current dose and recheck in one week.   Patient/Caregiver voiced understanding

## 2022-09-14 ENCOUNTER — CARE COORDINATION (OUTPATIENT)
Dept: CASE MANAGEMENT | Age: 72
End: 2022-09-14

## 2022-09-14 ENCOUNTER — ANTI-COAG VISIT (OUTPATIENT)
Dept: INTERNAL MEDICINE | Age: 72
End: 2022-09-14
Payer: MEDICARE

## 2022-09-14 DIAGNOSIS — Z79.01 ANTICOAGULATED ON COUMADIN: Primary | ICD-10-CM

## 2022-09-14 LAB — INR BLD: 3.3

## 2022-09-14 PROCEDURE — 99999 PR OFFICE/OUTPT VISIT,PROCEDURE ONLY: CPT | Performed by: INTERNAL MEDICINE

## 2022-09-14 PROCEDURE — 93793 ANTICOAG MGMT PT WARFARIN: CPT | Performed by: INTERNAL MEDICINE

## 2022-09-14 NOTE — PROGRESS NOTES
HOME MONITORING REPORT    INR today:   Results for orders placed or performed in visit on 09/14/22   Protime-INR   Result Value Ref Range    INR 3.30        INR Goal: 2.0-3.0    Dosing Plan  As of 9/14/2022      TTR:  28.4 % (4.3 y)   Full warfarin instructions:  7.5 mg every Mon, Wed, Fri; 15 mg all other days                PLAN: Have her hold her coumadin tonight. Check an INR on Monday.  Resume current dose tomorrow

## 2022-09-14 NOTE — CARE COORDINATION
[facility] Transitions Follow Up Call    2022    Patient: Yusuf Srivastava  Patient : 1950   MRN: [id number]  Reason for Admission: CKD  Discharge Date: 22 RARS: Readmission Risk Score: 16.1    CTN called home number listed. A female answered [facility]. CTN explained number listed as home number for patient. The female asked for patient's address. Female then identified herself as patient. States she is currently at [employer] working    Patient states she is doing alright. States she is drinking water and urinating without difficulty. States she had L knee surgery and has discomfort as a result. Denies sob, fever, chills, n/v/d or constipation. Patient states she has nightsweats. She states she is taking her medications as prescribed. States her granddaughter makes sure. Discussed upcoming hematology oncology appointment . Spoke with:  [facility] Transitions Subsequent and Final Call    Subsequent and Final Calls  Do you have any ongoing symptoms?: No  Do you have any questions related to your medications?: No  Do you currently have any active services?: No  Do you have any needs or concerns that I can assist you with?: No  Care Transitions Interventions  Other Interventions: Follow Up  Future Appointments   Date Time Provider Jose Shea   2022 10:00 AM SCHEDULE, [facility] MED ONC MA [facility] MED ONC Shannan BOOTH   2022 10:15 AM Malou Hannah, APRN N MARIO [facility]   10/5/2022  9:30 AM MD HIPOLITO Benito [facility]   Care Transitions Follow Up Call    Needs to be reviewed by the provider   Additional needs identified to be addressed with provider: No  none             Method of communication with provider : none      Care Transition Nurse contacted the patient by telephone to follow up after admission on 22. Verified name and  with patient as identifiers. Addressed changes since last contact:  reviewed symptoms  Discussed follow-up appointments. If no appointment was previously scheduled, appointment scheduling offered: Yes. Is follow up appointment scheduled within 7 days of discharge? No.    Advance Care Planning:   Does patient have an Advance Directive: reviewed and current. CTN reviewed discharge instructions, medical action plan and red flags with patient and discussed any barriers to care and/or understanding of plan of care after discharge. Discussed appropriate site of care based on symptoms and resources available to patient including: PCP  When to call 911. The patient agrees to contact the PCP office for questions related to their healthcare. Patients top risk factors for readmission: medical condition-CKD  Interventions to address risk factors:  hydrating, taking medications as prescribed, keeping appointments      Non-[facility] follow up appointment(s): n/a    CTN provided contact information for future needs. Plan for follow-up call in 5-7 days based on severity of symptoms and risk factors.   Plan for next call:  Continued progress, new or worsening symptoms         Alvin Vargas RN

## 2022-09-16 ENCOUNTER — CARE COORDINATION (OUTPATIENT)
Dept: CASE MANAGEMENT | Age: 72
End: 2022-09-16

## 2022-09-16 NOTE — CARE COORDINATION
[facility] Transitions Follow Up Call    2022    Patient: Willi Harper  Patient : 1950   MRN: [id number]  Reason for Admission: MIREYA  Discharge Date: 22 RARS: Readmission Risk Score: 16.1         Spoke with: Leny    Conversation:  Spoke with Cirilo Mei after 2 IDs. She states she is doing ok. She states her b/p rises and falls and she checks daily. She states her big issue right now is that she cannot afford her synthroid, iron pill, and semaglutide. I advised she call her PCP as sometimes they might have some samples. She states she is waiting to get paid. She states she and her mom are looking for a place and currently unable to use CPAP. She states she is eating but not sleeping well. With additional concerns will hand off to [facility]. Care Transitions Subsequent and Final Call    Subsequent and Final Calls  Care Transitions Interventions  Other Interventions: Follow Up  Future Appointments   Date Time Provider Jose Shea   2022 10:00 AM SCHEDULE, [facility]   2022 10:15 AM [facility]   10/5/2022  9:30 AM Joby Merchant MD [facility]     Care Transitions Follow Up Call    Needs to be reviewed by the provider   Additional needs identified to be addressed with provider: Yes  medications-pt having trouble affording meds and I requested she call your office to see if you have any samples to tide her over    Synthroid  Iron  Ozempic           Method of communication with provider : none      Care Transition Nurse contacted the patient by telephone to follow up after admission on 22. Verified name and  with patient as identifiers. Addressed changes since last contact:  medications  Discussed follow-up appointments. If no appointment was previously scheduled, appointment scheduling offered: Yes. Is follow up appointment scheduled within 7 days of discharge? Yes.     Advance Care Planning:   Does patient have an Advance Directive: reviewed and current. CTN reviewed discharge instructions, medical action plan and red flags with patient and discussed any barriers to care and/or understanding of plan of care after discharge. Discussed appropriate site of care based on symptoms and resources available to patient including: PCP. The patient agrees to contact the PCP office for questions related to their healthcare. Patients top risk factors for readmission: medical condition-DMII abd CKD3 abd afib  Interventions to address risk factors: Education of patient/family/caregiver/guardian to support self-management-on seeking help for meds      Non-[facility] follow up appointment(s):     CTN provided contact information for future needs. Plan for follow-up call in 3-5 days based on severity of symptoms and risk factors.   Plan for next call: referral to ambulatory care [address], [place], RN   [address] [place] [address] Transition Nurse  [phone]

## 2022-09-19 LAB — INR BLD: 2.9

## 2022-09-26 ENCOUNTER — CARE COORDINATION (OUTPATIENT)
Dept: CARE COORDINATION | Age: 72
End: 2022-09-26

## 2022-09-26 ASSESSMENT — ENCOUNTER SYMPTOMS: DYSPNEA ASSOCIATED WITH: MINIMAL EXERTION

## 2022-09-26 NOTE — CARE COORDINATION
Ambulatory Care Coordination Note  2022    ACC: Zina Simons, RN    Summary Note:  Ambulatory Care Nurse contacted the family via telephone to perform admission intake assessment for ambulatory care management. ACM Verified name and  with family as identifiers. Provided introduction to self, and explanation of the ACM role. Patient agreed to [facility] [employer] [facility] services. Patient reports needing medication assistance with the following medications:    -Synthorid 75mg- four dollars monthly    -Semaglutide 0.25 mg - 187 dollars three months supply     CPAP machine- patient reports change in living arrangements   [phone] [place] [address] that is requiring work at this time estimated project will be completed by this Thursday. Patient needs  Adaptive piece for outlet- ACM provided list of local resources for equipment   Meals - ACM provided list of local food resources     Meals   Patient [facility] approximately five sessions left. Patient has a dog \"shadow\". 2 [facility] assistance        Care Coordination Interventions    Referral from Primary Care Provider: No  Suggested Interventions and Community Resources          Goals Addressed                   This Visit's Progress     Conditions and Symptoms   No change     I will schedule office visits, as directed by my provider. I will keep my appointment or reschedule if I have to cancel. I will notify my provider of any barriers to my plan of care. I will follow my Zone Management tool to seek urgent or emergent care. I will notify my provider of any symptoms that indicate a worsening of my condition.     Barriers: financial, overwhelmed by complexity of regimen, stress, and time constraints  Plan for overcoming my barriers:     Patient Goal (What steps will patient take to achieve goal?):  Patient is able to discuss self-management of condition(s):  Pt demonstrates adherence to medications  Pt demonstrates understanding of self-monitoring  Patient is able to identify Red Flags:  Alert to potential adverse drug reactions(s) or side effects and actions to take should they arise  Discuss target symptoms and actions to take should they arise  Identify problems that require immediate PCP or specialist visit  Patient demonstrates understanding of access to PCP/Specialist:  Understands about scheduling routine Follow Up appointments   Understands about sick day appointment options for worsening of symptoms/progression (Same Day, E Visits)    Confidence: 6/10  Anticipated Goal Completion Date: 12/2022         Self Monitoring   No change     Self-Monitored Blood Glucose - I will check my blood sugar Fasting blood sugar, blood sugars ac & HS, blood sugars ac, and blood sugars pc  I will notify my provider of any trends of increasing or decreasing blood sugars over a 1 month period. I will notify my provider if I have any blood sugar readings less than 70 more than 2 times a month. Daily Weights - I will weight myself as directed - Daily and write down weights  I will notify my provider of any increase in weight by 3 or more pounds in 2 days OR 5 or more pounds in a week. Blood Pressure - I will take my blood pressure as directed - Twice daily  I will notify my provider of any trends of increasing or decreasing blood pressures over a month period of time. I will notify my provider of any changes in blood pressure associated with symptoms of dizziness, falls, passing out, headache, confusion/change in mental status. Patient Reported Blood Pressure No flowsheet data found. Patient Reported Weight No flowsheet data found. Patient Reported Blood Glucose No flowsheet data found.     Barriers: overwhelmed by complexity of regimen, stress, and time constraints  Plan for overcoming my barriers:   Patient will    Confidence: 5/10  Anticipated Goal Completion Date: 12/2022                Prior to Admission medications    Medication Sig Start Date End Date Taking? Authorizing Provider   docusate sodium (COLACE) 100 MG capsule TAKE 1 CAPSULE BY MOUTH TWICE DAILY AS NEEDED FOR CONSTIPATION 7/19/22  Yes Historical Provider, MD   oxyCODONE-acetaminophen (PERCOCET) 5-325 MG per tablet TAKE 1 TABLET BY MOUTH EVERY 4 HOURS AS NEEDED FOR PAIN 7/19/22  Yes Historical Provider, MD   ferrous gluconate (FERGON) 240 (27 Fe) MG tablet Take 1 tablet by mouth twice daily 8/30/22  Yes Niecy Cardoso MD   Semaglutide,0.25 or 0.5MG/DOS, 2 MG/1.5ML SOPN Inject 0.25 mg into the skin once a week 8/26/22  Yes Mariaa Babb MD   fexofenadine (ALLEGRA) 180 MG tablet Take 1 tablet by mouth once daily 8/26/22  Yes Mariaa Babb MD   pantoprazole (PROTONIX) 40 MG tablet TAKE 1 TABLET TWICE DAILY WITH MEALS 8/23/22  Yes Niecy Cardoso MD   bumetanide (BUMEX) 2 MG tablet Take 0.5 tablets by mouth daily as needed (In the body weight is 3 pound over 234 pound( considered as dry weight)) 8/18/22  Yes Rex Dancer, MD   levothyroxine (SYNTHROID) 75 MCG tablet Take 1 tablet by mouth Daily 8/19/22  Yes Rex Dancer, MD   sucralfate (CARAFATE) 1 GM tablet TAKE 1 TABLET FOUR TIMES DAILY 7/6/22  Yes Niecy Cardoso MD   rosuvastatin (CRESTOR) 5 MG tablet TAKE 1 TABLET EVERY DAY 7/6/22  Yes Niecy Cardoso MD   calcitRIOL (ROCALTROL) 0.25 MCG capsule TAKE 1 CAPSULE EVERY DAY 7/6/22  Yes Niecy Cardoso MD   Alcohol Swabs (B-D SINGLE USE SWABS REGULAR) PADS TID E11.21 4/22/22  Yes Niecy Cardoso MD   Blood Glucose Calibration (TRUE METRIX LEVEL 1) Low SOLN TID E11.21 4/22/22  Yes Niecy Cardoso MD   Hydrocortisone, Perianal, (PROCTO-CASPER) 1 % cream Apply topically 2 times daily.  4/19/22  Yes Niecy Cardoso MD   gabapentin (NEURONTIN) 300 MG capsule TAKE 1 CAPSULE BY MOUTH THREE TIMES DAILY 4/19/22 9/26/22 Yes Niecy Cardoso MD   ondansetron (ZOFRAN) 4 MG tablet Take 1 tablet by mouth every 8 hours as needed for Nausea 3/31/22  Yes Vandana Castle MD   baclofen (LIORESAL) 10 MG tablet Take 1 tablet by mouth 3 times daily As needed for hip pain  Patient taking differently: Take 10 mg by mouth 3 times daily as needed As needed for hip pain 3/1/22  Yes Vandana Castle MD   Cyanocobalamin 1000 MCG/ML KIT Inject 1 mL as directed every 30 days 3/1/22  Yes Vandana Castle MD   calcipotriene (DOVONEX) 0.005 % cream Use topically as needed  Patient taking differently: Apply 1 Dose topically 2 times daily as needed Use topically as needed 3/1/22  Yes Vandana Castle MD   escitalopram (LEXAPRO) 20 MG tablet Take 1 tablet by mouth daily 3/1/22  Yes Vandana Castle MD   tiotropium (SPIRIVA RESPIMAT) 2.5 MCG/ACT AERS inhaler Inhale 2 puffs into the lungs daily 3/1/22  Yes Vandana Castle MD   warfarin (COUMADIN) 7.5 MG tablet 7.5mg on Monday/Wed/Fri and 15mg all other days 2/14/22  Yes Vandana Castle MD   clobetasol (TEMOVATE) 0.05 % cream Apply topically 2 times daily.  2/14/22  Yes Vandana Castle MD   CPAP Machine MISC Inhale 1 each into the lungs nightly   Yes Historical Provider, MD   Multiple Vitamins-Minerals (CENTRUM ADULTS) TABS Take 1 tablet by mouth daily   Yes Historical Provider, MD   montelukast (SINGULAIR) 10 MG tablet Take 1 tablet by mouth daily 4/19/22 8/30/22  Vandana Castle MD   potassium chloride (KLOR-CON) 10 MEQ extended release tablet Take 1 tablet by mouth daily 2/14/22 8/18/22  Vandana Castle MD       Future Appointments   Date Time Provider Jose Shea   10/5/2022  9:30 AM MD HIPOLITO Mixon [facility]   11/7/2022 11:00 AM CHANELLE Mccullough [facility]   11/7/2022 11:00 AM SCHEDULE, L MED ONC MA [facility] MED ONC [facility]an [facility]   ,   Diabetes Assessment    Medic Alert ID: No  Meal Planning: None   How often do you test your blood sugar?: No Testing (Comment: A1c 5.6, takes no medications for dx)   Do you have barriers with adherence to non-pharmacologic self-management interventions?  (Nutrition/Exercise/Self-Monitoring): Yes   Have you ever had to go to the ED for symptoms of low blood sugar?: No            ,   Congestive Heart Failure Assessment           Symptoms:          , and   COPD Assessment    Does the patient understand envrionmental exposure?: No  Does the patient have a nebulizer?: No  Does the patient use a space with inhaled medications?: Yes            Symptoms:     Symptom course: stable  Breathlessness: minimal exertion  Increase use of rapid acting/rescue inhaled medications?: No  Change in chronic cough?: No/At Baseline  Change in sputum?: No/At Baseline  Self Monitoring - SaO2: No  Have you had a recent diagnosis of pneumonia either by PCP or at a hospital?: No           Kayce Justice , [address]

## 2022-09-27 LAB — INR BLD: 2

## 2022-09-28 ENCOUNTER — ANTI-COAG VISIT (OUTPATIENT)
Dept: INTERNAL MEDICINE | Age: 72
End: 2022-09-28
Payer: MEDICARE

## 2022-09-28 ENCOUNTER — ANTI-COAG VISIT (OUTPATIENT)
Dept: INTERNAL MEDICINE | Age: 72
End: 2022-09-28

## 2022-09-28 DIAGNOSIS — Z79.01 ANTICOAGULATED ON COUMADIN: Primary | ICD-10-CM

## 2022-09-28 PROCEDURE — 93793 ANTICOAG MGMT PT WARFARIN: CPT | Performed by: INTERNAL MEDICINE

## 2022-09-28 PROCEDURE — 99999 PR OFFICE/OUTPT VISIT,PROCEDURE ONLY: CPT | Performed by: INTERNAL MEDICINE

## 2022-09-28 NOTE — PROGRESS NOTES
HOME MONITORING REPORT    INR today:   Results for orders placed or performed in visit on 09/28/22   Protime-INR   Result Value Ref Range    INR 2.00        INR Goal: 2.0-3.0    Dosing Plan  As of 9/28/2022      TTR:  28.9 % (4.4 y)   Full warfarin instructions:  7.5 mg every Mon, Wed, Fri; 15 mg all other days                PLAN: Advised patient/caregiver to continue current dose and recheck in one week.   Patient/Caregiver voiced understanding

## 2022-09-28 NOTE — PROGRESS NOTES
HOME MONITORING REPORT    INR today:   Results for orders placed or performed in visit on 09/28/22   Protime-INR   Result Value Ref Range    INR 2.90        INR Goal: 2.0-3.0    Dosing Plan  As of 9/28/2022      TTR:  28.5 % (4.3 y)   Full warfarin instructions:  7.5 mg every Mon, Wed, Fri; 15 mg all other days                PLAN: Advised patient/caregiver to continue current dose and recheck in one week.   Patient/Caregiver voiced understanding

## 2022-10-04 ENCOUNTER — CARE COORDINATION (OUTPATIENT)
Dept: CARE COORDINATION | Age: 72
End: 2022-10-04

## 2022-10-04 NOTE — CARE COORDINATION
LPN CC attempted to reach patient for care coordination f/u call to discuss resources. LPN CC was unable to reach patient. Left detailed VM with reason for call & contact information. Will schedule for f/u for another time.    Kurt Vides [address] [place] [address]  [phone]

## 2022-10-05 ENCOUNTER — OFFICE VISIT (OUTPATIENT)
Dept: INTERNAL MEDICINE | Age: 72
End: 2022-10-05
Payer: MEDICARE

## 2022-10-05 VITALS
WEIGHT: 234.8 LBS | HEART RATE: 84 BPM | HEIGHT: 67 IN | SYSTOLIC BLOOD PRESSURE: 132 MMHG | BODY MASS INDEX: 36.85 KG/M2 | DIASTOLIC BLOOD PRESSURE: 70 MMHG | OXYGEN SATURATION: 98 %

## 2022-10-05 DIAGNOSIS — E53.8 B12 DEFICIENCY: ICD-10-CM

## 2022-10-05 DIAGNOSIS — E55.9 VITAMIN D DEFICIENCY: ICD-10-CM

## 2022-10-05 DIAGNOSIS — G62.9 NEUROPATHY: ICD-10-CM

## 2022-10-05 DIAGNOSIS — N18.9 CHRONIC KIDNEY DISEASE, UNSPECIFIED CKD STAGE: ICD-10-CM

## 2022-10-05 DIAGNOSIS — E11.21 TYPE II DIABETES MELLITUS WITH NEPHROPATHY (HCC): Primary | ICD-10-CM

## 2022-10-05 DIAGNOSIS — I10 PRIMARY HYPERTENSION: ICD-10-CM

## 2022-10-05 DIAGNOSIS — F32.89 OTHER DEPRESSION: ICD-10-CM

## 2022-10-05 DIAGNOSIS — E78.5 HYPERLIPIDEMIA, UNSPECIFIED HYPERLIPIDEMIA TYPE: ICD-10-CM

## 2022-10-05 DIAGNOSIS — G89.29 CHRONIC LOW BACK PAIN, UNSPECIFIED BACK PAIN LATERALITY, UNSPECIFIED WHETHER SCIATICA PRESENT: ICD-10-CM

## 2022-10-05 DIAGNOSIS — D68.59 HYPERCOAGULABLE STATE (HCC): ICD-10-CM

## 2022-10-05 DIAGNOSIS — E11.69 TYPE 2 DIABETES MELLITUS WITH OTHER SPECIFIED COMPLICATION, UNSPECIFIED WHETHER LONG TERM INSULIN USE (HCC): ICD-10-CM

## 2022-10-05 DIAGNOSIS — D50.9 IRON DEFICIENCY ANEMIA, UNSPECIFIED IRON DEFICIENCY ANEMIA TYPE: ICD-10-CM

## 2022-10-05 DIAGNOSIS — M54.50 CHRONIC LOW BACK PAIN, UNSPECIFIED BACK PAIN LATERALITY, UNSPECIFIED WHETHER SCIATICA PRESENT: ICD-10-CM

## 2022-10-05 DIAGNOSIS — E03.9 HYPOTHYROIDISM, UNSPECIFIED TYPE: ICD-10-CM

## 2022-10-05 DIAGNOSIS — I82.5Y9 CHRONIC DEEP VEIN THROMBOSIS (DVT) OF PROXIMAL VEIN OF LOWER EXTREMITY, UNSPECIFIED LATERALITY (HCC): ICD-10-CM

## 2022-10-05 DIAGNOSIS — Z96.659 HISTORY OF TOTAL KNEE REPLACEMENT, UNSPECIFIED LATERALITY: ICD-10-CM

## 2022-10-05 DIAGNOSIS — R82.90 FOUL SMELLING URINE: ICD-10-CM

## 2022-10-05 DIAGNOSIS — K21.9 GASTROESOPHAGEAL REFLUX DISEASE WITHOUT ESOPHAGITIS: ICD-10-CM

## 2022-10-05 DIAGNOSIS — J45.20 MILD INTERMITTENT REACTIVE AIRWAY DISEASE WITHOUT COMPLICATION: ICD-10-CM

## 2022-10-05 DIAGNOSIS — Z23 NEED FOR VACCINATION: ICD-10-CM

## 2022-10-05 DIAGNOSIS — R30.0 DYSURIA: ICD-10-CM

## 2022-10-05 DIAGNOSIS — Z23 NEEDS FLU SHOT: ICD-10-CM

## 2022-10-05 DIAGNOSIS — G47.30 SLEEP APNEA, UNSPECIFIED TYPE: ICD-10-CM

## 2022-10-05 LAB
ALBUMIN SERPL-MCNC: 4.5 G/DL (ref 3.5–5.2)
ALP BLD-CCNC: 81 U/L (ref 35–104)
ALT SERPL-CCNC: 10 U/L (ref 5–33)
ANION GAP SERPL CALCULATED.3IONS-SCNC: 10 MMOL/L (ref 7–19)
AST SERPL-CCNC: 19 U/L (ref 5–32)
BASOPHILS ABSOLUTE: 0 K/UL (ref 0–0.2)
BASOPHILS RELATIVE PERCENT: 0.6 % (ref 0–1)
BILIRUB SERPL-MCNC: 0.4 MG/DL (ref 0.2–1.2)
BUN BLDV-MCNC: 27 MG/DL (ref 8–23)
CALCIUM SERPL-MCNC: 9 MG/DL (ref 8.8–10.2)
CHLORIDE BLD-SCNC: 107 MMOL/L (ref 98–111)
CHOLESTEROL, TOTAL: 182 MG/DL (ref 160–199)
CO2: 24 MMOL/L (ref 22–29)
CREAT SERPL-MCNC: 1.3 MG/DL (ref 0.5–0.9)
CREATININE URINE: 154.5 MG/DL (ref 4.2–622)
EOSINOPHILS ABSOLUTE: 0.1 K/UL (ref 0–0.6)
EOSINOPHILS RELATIVE PERCENT: 2.4 % (ref 0–5)
GFR AFRICAN AMERICAN: 49
GFR NON-AFRICAN AMERICAN: 40
GLUCOSE BLD-MCNC: 75 MG/DL (ref 74–109)
HCT VFR BLD CALC: 34.6 % (ref 37–47)
HDLC SERPL-MCNC: 103 MG/DL (ref 65–121)
HEMOGLOBIN: 10.5 G/DL (ref 12–16)
IMMATURE GRANULOCYTES #: 0 K/UL
LDL CHOLESTEROL CALCULATED: 66 MG/DL
LYMPHOCYTES ABSOLUTE: 1.5 K/UL (ref 1.1–4.5)
LYMPHOCYTES RELATIVE PERCENT: 29.2 % (ref 20–40)
MCH RBC QN AUTO: 29.3 PG (ref 27–31)
MCHC RBC AUTO-ENTMCNC: 30.3 G/DL (ref 33–37)
MCV RBC AUTO: 96.6 FL (ref 81–99)
MICROALBUMIN UR-MCNC: <1.2 MG/DL (ref 0–19)
MICROALBUMIN/CREAT UR-RTO: NORMAL MG/G
MONOCYTES ABSOLUTE: 0.5 K/UL (ref 0–0.9)
MONOCYTES RELATIVE PERCENT: 9.3 % (ref 0–10)
NEUTROPHILS ABSOLUTE: 2.9 K/UL (ref 1.5–7.5)
NEUTROPHILS RELATIVE PERCENT: 58.3 % (ref 50–65)
PDW BLD-RTO: 17.2 % (ref 11.5–14.5)
PLATELET # BLD: 206 K/UL (ref 130–400)
PMV BLD AUTO: 12.6 FL (ref 9.4–12.3)
POTASSIUM SERPL-SCNC: 4.4 MMOL/L (ref 3.5–5)
RBC # BLD: 3.58 M/UL (ref 4.2–5.4)
SODIUM BLD-SCNC: 141 MMOL/L (ref 136–145)
TOTAL PROTEIN: 7.5 G/DL (ref 6.6–8.7)
TRIGL SERPL-MCNC: 65 MG/DL (ref 0–149)
TSH SERPL DL<=0.05 MIU/L-ACNC: 1.53 UIU/ML (ref 0.27–4.2)
VITAMIN B-12: >2000 PG/ML (ref 211–946)
VITAMIN D 25-HYDROXY: 28.1 NG/ML
WBC # BLD: 5 K/UL (ref 4.8–10.8)

## 2022-10-05 PROCEDURE — 1123F ACP DISCUSS/DSCN MKR DOCD: CPT | Performed by: INTERNAL MEDICINE

## 2022-10-05 PROCEDURE — G0008 ADMIN INFLUENZA VIRUS VAC: HCPCS | Performed by: INTERNAL MEDICINE

## 2022-10-05 PROCEDURE — 90694 VACC AIIV4 NO PRSRV 0.5ML IM: CPT | Performed by: INTERNAL MEDICINE

## 2022-10-05 PROCEDURE — G8484 FLU IMMUNIZE NO ADMIN: HCPCS | Performed by: INTERNAL MEDICINE

## 2022-10-05 PROCEDURE — 3017F COLORECTAL CA SCREEN DOC REV: CPT | Performed by: INTERNAL MEDICINE

## 2022-10-05 PROCEDURE — 2022F DILAT RTA XM EVC RTNOPTHY: CPT | Performed by: INTERNAL MEDICINE

## 2022-10-05 PROCEDURE — G8417 CALC BMI ABV UP PARAM F/U: HCPCS | Performed by: INTERNAL MEDICINE

## 2022-10-05 PROCEDURE — 1036F TOBACCO NON-USER: CPT | Performed by: INTERNAL MEDICINE

## 2022-10-05 PROCEDURE — 96372 THER/PROPH/DIAG INJ SC/IM: CPT | Performed by: INTERNAL MEDICINE

## 2022-10-05 PROCEDURE — 3044F HG A1C LEVEL LT 7.0%: CPT | Performed by: INTERNAL MEDICINE

## 2022-10-05 PROCEDURE — G8427 DOCREV CUR MEDS BY ELIG CLIN: HCPCS | Performed by: INTERNAL MEDICINE

## 2022-10-05 PROCEDURE — 99214 OFFICE O/P EST MOD 30 MIN: CPT | Performed by: INTERNAL MEDICINE

## 2022-10-05 PROCEDURE — G8399 PT W/DXA RESULTS DOCUMENT: HCPCS | Performed by: INTERNAL MEDICINE

## 2022-10-05 PROCEDURE — 1090F PRES/ABSN URINE INCON ASSESS: CPT | Performed by: INTERNAL MEDICINE

## 2022-10-05 RX ORDER — GABAPENTIN 300 MG/1
CAPSULE ORAL
Qty: 270 CAPSULE | Refills: 0 | Status: SHIPPED | OUTPATIENT
Start: 2022-10-05 | End: 2023-03-23

## 2022-10-05 RX ORDER — CYANOCOBALAMIN 1000 UG/ML
1000 INJECTION INTRAMUSCULAR; SUBCUTANEOUS ONCE
Status: COMPLETED | OUTPATIENT
Start: 2022-10-05 | End: 2022-10-05

## 2022-10-05 RX ADMIN — CYANOCOBALAMIN 1000 MCG: 1000 INJECTION INTRAMUSCULAR; SUBCUTANEOUS at 10:39

## 2022-10-05 NOTE — PROGRESS NOTES
Chief Complaint   Patient presents with    3 Month Follow-Up       HPI: Julio César Soriano is a 67 y.o. female is here for follow-up of type 2 diabetes. Her lab work is currently pending. She states that her blood sugars are running about 117. She does have a history of diabetic peripheral neuropathy. She is taking her gabapentin as prescribed. She recently had knee replacement surgery. She is taking her Percocet as needed. Her pain is well controlled. She does have a history of iron deficiency anemia and is on ferrous gluconate. She states that she recently saw Dr. Victor Manuel Joseph. Her heart rate was in the 180s. Dr. Victor Manuel Joseph put her on some Lopressor. She states that she has not had any complaints of chest pain, chest pressure or shortness of breath. She does complain of an odor to her urine. We will check a urinalysis today does complain of some burning when she urinates. Her allergies are stable her current dose of Allegra. Her acid reflux is well controlled. Her blood pressure is currently well controlled. Her thyroid function test are pending. She is tolerating her statin without difficulty. She does have a history of renal insufficiency. Her labs are currently pending but her allergies are stable. She does take baclofen as needed for chronic back pain, which does work fairly well. She is also takes B12 injections. Her mood is stable on her current dose of Lexapro. She does use Spiriva as needed for history of reactive airways disease. She is on Coumadin for history of a chronic DVT. She is supposed to be using her CPAP machine. However, she has not been using it regularly. She would like to start weaning off of the Carafate. We can cut it down from 4 times a day to 3 times a day. She would like a flu vaccine today.   She also would like a B12 injection    Past Medical History:   Diagnosis Date    Abdominal pain     Abnormal EKG     Acute sinusitis     Acute superficial venous thrombosis of left lower extremity     Allergic reaction to spider bite     Anemia     Anticoagulated     Anxiety     Arrhythmia     Asthmatic bronchitis without complication 9/59/1232    Ataxic gait     Lyons's esophagus     Bowel obstruction (HCC)     Burn of abdomen wall, second degree, initial encounter 8/27/2018    Callus     Cardiac pacemaker     Cerebral artery occlusion with cerebral infarction [facility])     CKD (chronic kidney disease) stage 2, GFR 60-89 ml/min     Coat's syndrome     Coat's syndrome     both eyes    COPD (chronic obstructive pulmonary disease) (HCC)     Depression     Diabetes mellitus type 2 in nonobese (HCC)     Diabetic nephropathy (HCC)     Disequilibrium     Dizziness     DVT (deep venous thrombosis) (HCC)     Exudative retinopathy     Fibromyalgia     Fibromyositis     Gastric ulcer     GERD (gastroesophageal reflux disease)     History of gastric bypass     Hx of blood clots     Hx of lupus anticoagulant disorder     Hyperlipidemia 5/6/2019    Hypertension     Hypothyroidism     Intermittent claudication (HCC)     Intestinal obstruction (HCC)     Iron deficiency     Left-sided weakness     Low vitamin D level     Lupus ([address]     Menopause     Obesity     Osteoarthritis     Osteoporosis     Other iron deficiency anemias     Palliative care patient 09/24/2020    Pernicious anemia     PONV (postoperative nausea and vomiting)     PUPP (pruritic urticarial papules and plaques of pregnancy)     Restless legs syndrome (RLS) 7/11/2019    Right leg numbness     Right sided sciatica     Sarcoidosis     with liver involvement    Sciatica     Secondary hyperparathyroidism ([address]     Shingles     Shortness of breath     Sleep apnea     Stomach ulcer     Syncope     Visual loss, one eye       Past Surgical History:   Procedure Laterality Date    APPENDECTOMY      CARDIAC CATHETERIZATION  10/21/13  [facility]    EF over 60%    CHOLECYSTECTOMY      COLONOSCOPY  02/2010    negative    COLONOSCOPY  02/22/2010    Dr Arielle Chris COLONOSCOPY  04/01/2016    Dr LAKHWINDER Horvath-internal hemorrhoids, 5 yr recall    COLONOSCOPY N/A 05/07/2021    Dr Naik Divers looping/tortuosity throughout the left colon, BE=Normal    DILATATION, ESOPHAGUS      EYE SURGERY      Cyst on Right eye    EYE SURGERY      GASTRIC BYPASS SURGERY      GASTRIC BYPASS SURGERY      HERNIA REPAIR      HYSTERECTOMY (CERVIX STATUS UNKNOWN)  1974    Complete    INCONTINENCE SURGERY      Bladder Sling    INFECTED SKIN DEBRIDEMENT Right     dog bite right forearm    OTHER SURGICAL HISTORY      IVC filter    PACEMAKER INSERTION      PACEMAKER PLACEMENT      medtronic    CO TOTAL KNEE ARTHROPLASTY Right 03/26/2018    TOTAL KNEE REPLACEMENT COMPLEX PRIMARY performed by Felipe Mandel MD at [address] BSO (CERVIX REMOVED) Bilateral 1974    age 21    TONSILLECTOMY AND ADENOIDECTOMY      TOTAL KNEE ARTHROPLASTY Left 07/19/2022    UPPER GASTROINTESTINAL ENDOSCOPY  12/2011    gerd s/p gastric bypass    UPPER GASTROINTESTINAL ENDOSCOPY  02/2014    normal s/p gastric bypass    UPPER GASTROINTESTINAL ENDOSCOPY  02/2010    biopsy neg Barretts, chronic reflux esophagitis s/p gastric bypass    UPPER GASTROINTESTINAL ENDOSCOPY  07/2006    unremarkable s/p gastric bypass    UPPER GASTROINTESTINAL ENDOSCOPY  08/10/2015    Dr Minor Max ENDOSCOPY N/A 04/01/2016    Dr. Jang President:  H Pylori(-), HH, o/w normal    UPPER GASTROINTESTINAL ENDOSCOPY N/A 05/07/2021    Dr Court Sandoval hiatal hernia, previous gastric bypass surgery, gastritis    VENA CAVA FILTER PLACEMENT Right 2003      Social History     Socioeconomic History    Marital status:     Number of children: 1   Occupational History     Employer: [employer]      Comment:    Tobacco Use    Smoking status: Never    Smokeless tobacco: Never   Vaping Use    Vaping Use: Never used   Substance and Sexual Activity Alcohol use: Never    Drug use: Never    Sexual activity: Not Currently     Comment: has a daughter   Social History Narrative    CODE STATUS: Full Code    HEALTH CARE PROXY: her daughter, [phone]    Backup: Eliezer Kathleen, grand daughter, [phone]    AMBULATES: uses a Rollator    DOMICILED: has steps to enter, no stairs inside, lives with her daughter and two grand daughters, her niece, and her         Lives with daughter, son-in-law, granddaughter, niece. Drives very little, daughter usually transports pt. Works part time at [employer]. Has pet dog. Social Determinants of Health     Financial Resource Strain: Medium Risk    Difficulty of Paying Living Expenses: Somewhat hard   Food Insecurity: Food Insecurity Present    Worried About Running Out of Food in the Last Year: Sometimes true    Ran Out of Food in the Last Year: Never true   Transportation Needs: No Transportation Needs    Lack of Transportation (Medical): No    Lack of Transportation (Non-Medical):  No   Physical Activity: Inactive    Days of Exercise per Week: 0 days    Minutes of Exercise per Session: 0 min      Family History   Problem Relation Age of Onset    Uterine Cancer Mother     Cervical Cancer Mother     Coronary Art Dis Mother     Heart Disease Father     Lung Cancer Father     Other Father         renal failure    Cervical Cancer Sister     Other Sister         fibromyalgia    Colon Cancer Brother     Colon Polyps Brother     Other Brother         owens    Uterine Cancer Maternal Grandmother     Cervical Cancer Maternal Grandmother     Diabetes Maternal Grandmother     Diabetes Paternal Aunt     Breast Cancer Maternal Cousin     No Known Problems Daughter     Esophageal Cancer Neg Hx     Liver Cancer Neg Hx     Liver Disease Neg Hx     Stomach Cancer Neg Hx     Rectal Cancer Neg Hx         Current Outpatient Medications   Medication Sig Dispense Refill    gabapentin (NEURONTIN) 300 MG capsule TAKE 1 CAPSULE BY MOUTH THREE TIMES DAILY 270 capsule 0    docusate sodium (COLACE) 100 MG capsule TAKE 1 CAPSULE BY MOUTH TWICE DAILY AS NEEDED FOR CONSTIPATION      oxyCODONE-acetaminophen (PERCOCET) 5-325 MG per tablet TAKE 1 TABLET BY MOUTH EVERY 4 HOURS AS NEEDED FOR PAIN      ferrous gluconate (FERGON) 240 (27 Fe) MG tablet Take 1 tablet by mouth twice daily 180 tablet 0    Semaglutide,0.25 or 0.5MG/DOS, 2 MG/1.5ML SOPN Inject 0.25 mg into the skin once a week 3 pen 2    fexofenadine (ALLEGRA) 180 MG tablet Take 1 tablet by mouth once daily 90 tablet 2    pantoprazole (PROTONIX) 40 MG tablet TAKE 1 TABLET TWICE DAILY WITH MEALS 180 tablet 1    bumetanide (BUMEX) 2 MG tablet Take 0.5 tablets by mouth daily as needed (In the body weight is 3 pound over 234 pound( considered as dry weight)) 90 tablet 0    levothyroxine (SYNTHROID) 75 MCG tablet Take 1 tablet by mouth Daily 30 tablet 3    sucralfate (CARAFATE) 1 GM tablet TAKE 1 TABLET FOUR TIMES DAILY 360 tablet 0    rosuvastatin (CRESTOR) 5 MG tablet TAKE 1 TABLET EVERY DAY 90 tablet 3    calcitRIOL (ROCALTROL) 0.25 MCG capsule TAKE 1 CAPSULE EVERY DAY 90 capsule 0    Alcohol Swabs (B-D SINGLE USE SWABS REGULAR) PADS TID E11.21 300 each 3    Blood Glucose Calibration (TRUE METRIX LEVEL 1) Low SOLN TID E11.21 3 each 3    montelukast (SINGULAIR) 10 MG tablet Take 1 tablet by mouth daily 90 tablet 3    Hydrocortisone, Perianal, (PROCTO-CASPER) 1 % cream Apply topically 2 times daily.  1 each 1    ondansetron (ZOFRAN) 4 MG tablet Take 1 tablet by mouth every 8 hours as needed for Nausea 30 tablet 1    baclofen (LIORESAL) 10 MG tablet Take 1 tablet by mouth 3 times daily As needed for hip pain (Patient taking differently: Take 10 mg by mouth 3 times daily as needed As needed for hip pain) 30 tablet 1    Cyanocobalamin 1000 MCG/ML KIT Inject 1 mL as directed every 30 days 3 kit 1    calcipotriene (DOVONEX) 0.005 % cream Use topically as needed (Patient taking differently: Apply 1 Dose topically 2 times daily as needed Use topically as needed) 3 each 1    escitalopram (LEXAPRO) 20 MG tablet Take 1 tablet by mouth daily 90 tablet 3    tiotropium (SPIRIVA RESPIMAT) 2.5 MCG/ACT AERS inhaler Inhale 2 puffs into the lungs daily 3 each 1    warfarin (COUMADIN) 7.5 MG tablet 7.5mg on Monday/Wed/Fri and 15mg all other days (Patient taking differently: 7.5 mg daily) 180 tablet 3    clobetasol (TEMOVATE) 0.05 % cream Apply topically 2 times daily.  3 each 2    Multiple Vitamins-Minerals (CENTRUM ADULTS) TABS Take 1 tablet by mouth daily      CPAP Machine MISC Inhale 1 each into the lungs nightly (Patient not taking: Reported on 10/5/2022)       Current Facility-Administered Medications   Medication Dose Route Frequency Provider Last Rate Last Admin    cyanocobalamin injection 1,000 mcg  1,000 mcg IntraMUSCular Once Niecy Cardoso MD            Patient Active Problem List   Diagnosis    Gastroesophageal reflux disease    History of gastric bypass    Family history of colon cancer    Fibromyalgia    Encounter for current long-term use of anticoagulants    Primary osteoarthritis of right knee    Arthritis of knee    Hypertensive disorder    Hyperglycemia    Complex sleep apnea syndrome    Iron deficiency anemia    Restrictive airway disease    DVT, lower extremity, proximal, acute, unspecified laterality ([address]    History of ulcer disease    Anticoagulated on Coumadin    Postmenopausal osteoporosis    Diabetes mellitus ([address]    Pacemaker    History of DVT (deep vein thrombosis)    Lupus anticoagulant disorder ([address]    History of pulmonary embolism    Thrombocytopenia (HCC)    Hypothyroidism    Morbid obesity due to excess calories (HCC)    Asthmatic bronchitis without complication    Gastroenteritis    Colic    Abdominal pain    Non-intractable vomiting with nausea    Severe episode of recurrent major depressive disorder, without psychotic features ([address]    Lower abdominal pain    Chronic heartburn    S/P gastric bypass    Dog bite    Cellulitis of right upper extremity    Abscess of right arm    Soft tissue infection    Skin ulcer of upper arm, with fat layer exposed ([facility]    History of Gage-en-Y gastric bypass    Hyperlipidemia    Hypersomnia    Nonrheumatic mitral valve regurgitation    Obesity, Class II, BMI 35-39.9    Peripheral edema    Restless legs syndrome (RLS)    Vitamin D deficiency    Chronic deep vein thrombosis (DVT) of proximal vein of lower extremity (HCC)    Unspecified severe protein-calorie malnutrition (HCC)    H/O systemic lupus erythematosus (SLE) (HCC)    Type II diabetes mellitus with nephropathy (HCC)    Stable angina (HCC)    Stage 3a chronic kidney disease (HCC)    Chronic renal disease, stage III ([facility] [[id number]]    Acute kidney injury superimposed on chronic kidney disease (HCC)    Chronic obstructive lung disease (HCC)    Paroxysmal atrial fibrillation (HCC)    Paroxysmal SVT (supraventricular tachycardia) (HCC)    SSS (sick sinus syndrome) ([facility]        Review of Systems   Constitutional:  Positive for fatigue. Negative for activity change, appetite change, chills, diaphoresis, fever and unexpected weight change. HENT:  Negative for congestion, ear pain, hearing loss, nosebleeds, postnasal drip, rhinorrhea, sinus pressure, sinus pain, sneezing, sore throat, tinnitus, trouble swallowing and voice change. Eyes:  Negative for discharge and itching. Respiratory:  Negative for apnea, cough, chest tightness, shortness of breath, wheezing and stridor. Cardiovascular:  Positive for leg swelling. Negative for chest pain and palpitations. Left leg swelling; chronic secondary to DVT. Gastrointestinal:  Negative for abdominal distention, abdominal pain, blood in stool, constipation, diarrhea, nausea and vomiting. Endocrine: Negative for cold intolerance, heat intolerance, polydipsia, polyphagia and polyuria.    Genitourinary:  Negative for difficulty urinating, dysuria, flank pain, frequency, hematuria and urgency. Foul smelling urine   Musculoskeletal:  Positive for arthralgias and back pain. Negative for gait problem, joint swelling, myalgias, neck pain and neck stiffness. She had bilateral knee pain. Recently had a right total knee replacement. Skin:  Negative for color change, pallor, rash and wound. Allergic/Immunologic: Positive for environmental allergies. Negative for food allergies and immunocompromised state. Neurological:  Negative for dizziness, tremors, seizures, syncope, facial asymmetry, speech difficulty, weakness, light-headedness, numbness and headaches. Numbness and tingling to her lower extremities   Hematological:  Negative for adenopathy. Does not bruise/bleed easily. Psychiatric/Behavioral:  Positive for dysphoric mood. Negative for agitation, confusion, decreased concentration and hallucinations. The patient is not nervous/anxious and is not hyperactive. Mood has improved     /70   Pulse 84   Ht 5' 7\" (1.702 m)   Wt 234 lb 12.8 oz (106.5 kg)   SpO2 98%   BMI 36.77 kg/m²   Physical Exam  Vitals and nursing note reviewed. Constitutional:       General: She is not in acute distress. Appearance: Normal appearance. She is well-developed. She is obese. She is not ill-appearing, toxic-appearing or diaphoretic. HENT:      Head: Normocephalic and atraumatic. Right Ear: Tympanic membrane, ear canal and external ear normal. There is no impacted cerumen. Left Ear: Tympanic membrane, ear canal and external ear normal.      Nose: Nose normal. No congestion or rhinorrhea. Mouth/Throat:      Mouth: Mucous membranes are moist.      Pharynx: Oropharynx is clear. No oropharyngeal exudate or posterior oropharyngeal erythema. Eyes:      General: No scleral icterus. Right eye: No discharge. Left eye: No discharge. Extraocular Movements: Extraocular movements intact.       Conjunctiva/sclera: Conjunctivae normal.      Pupils: Pupils are equal, round, and reactive to light. Neck:      Thyroid: No thyromegaly. Vascular: No carotid bruit or JVD. Trachea: No tracheal deviation. Cardiovascular:      Rate and Rhythm: Normal rate and regular rhythm. Pulses: Normal pulses. Heart sounds: Normal heart sounds. No murmur heard. No friction rub. No gallop. Pulmonary:      Effort: Pulmonary effort is normal. No respiratory distress. Breath sounds: Normal breath sounds. No stridor. No wheezing, rhonchi or rales. Chest:      Chest wall: No tenderness. Abdominal:      General: Abdomen is flat. Bowel sounds are normal. There is no distension. Palpations: Abdomen is soft. There is no mass. Tenderness: There is no abdominal tenderness. There is no right CVA tenderness, left CVA tenderness, guarding or rebound. Hernia: No hernia is present. Musculoskeletal:         General: Tenderness present. No swelling, deformity or signs of injury. Normal range of motion. Cervical back: Normal range of motion. No rigidity. No muscular tenderness. Lumbar back: Spasms present. No swelling or bony tenderness. Normal range of motion. Back:       Right lower leg: No edema. Left lower leg: No edema. Comments:  Does have some hip pain on palpation of the left hip she also has calf pain and tenderness on palpation of the right calf. She does have some edema. There is no erythema noted.     Visual inspection:  Deformity/amputation: absent  Skin lesions/pre-ulcerative calluses: present to bottom of the left foot, the base of the right great toe and her left great toe  Edema: right- negative, left- negative    Sensory exam:  Monofilament sensation: normal  (minimum of 5 random plantar locations tested, avoiding callused areas - > 1 area with absence of sensation is + for neuropathy)    Plus at least one of the following:  Pulses: normal,   Pinprick: Intact  Proprioception: N/A  Vibration (128 Hz): N/A   Lymphadenopathy:      Cervical: No cervical adenopathy. Skin:     General: Skin is warm and dry. Capillary Refill: Capillary refill takes less than 2 seconds. Coloration: Skin is not jaundiced or pale. Findings: No bruising, erythema, lesion or rash. Neurological:      General: No focal deficit present. Mental Status: She is alert and oriented to person, place, and time. Mental status is at baseline. Cranial Nerves: No cranial nerve deficit. Sensory: No sensory deficit. Motor: No weakness or abnormal muscle tone. Coordination: Coordination normal.      Gait: Gait normal.      Deep Tendon Reflexes: Reflexes normal.   Psychiatric:         Mood and Affect: Mood normal. Mood is not anxious or depressed. Behavior: Behavior normal.         Thought Content: Thought content normal.         Judgment: Judgment normal.       1. Type II diabetes mellitus with nephropathy ([address]    2. Neuropathy    3. Need for vaccination    4. Vitamin D deficiency    5. Foul smelling urine    6. Dysuria    7. History of total knee replacement, unspecified laterality    8. Iron deficiency anemia, unspecified iron deficiency anemia type    9. Gastroesophageal reflux disease without esophagitis    10. Primary hypertension    11. Hypothyroidism, unspecified type    12. Hyperlipidemia, unspecified hyperlipidemia type    13. Chronic kidney disease, unspecified CKD stage    14. Mild intermittent reactive airway disease without complication    15. Chronic low back pain, unspecified back pain laterality, unspecified whether sciatica present    16. B12 deficiency    17. Needs flu shot    18. Other depression    19. Chronic deep vein thrombosis (DVT) of proximal vein of lower extremity, unspecified laterality ([address]    20. Hypercoagulable state ([address]    21.  Sleep apnea, unspecified type        ASSESSMENT/PLAN:    65-year-old woman here for follow-up    1. Peripheral neuropathy: Continue gabapentin as prescribed    2. Foul-smelling urine/dysuria: Check a urinalysis today    3. History of knee replacement surgery: She is on Percocet as needed for pain. Her pain is well controlled    4. Iron deficiency anemia: Continue ferrous gluconate    5. Type 2 diabetes: Labs are currently pending. Continue semaglutide. Diabetic shoes ordered today. GERD      6. Acid reflux: Continue Protonix. She would like to start weaning off of her Carafate. We can cut this down to 3 times a day    7. Hypertension: Blood pressure well controlled on Bumex and Lopressor    8. Hypothyroidism: Continue levothyroxine at current dose until we can get some lab work back    9. Hyperlipidemia: Lipid panel is currently pending. Continue rosuvastatin for now    10. Chronic kidney disease: Awaiting lab work. Continue calcitriol    11. Reactive airways disease: Continue Singulair and Spiriva    12. Back pain: Stable on baclofen    13. B12 deficiency: B12 injection given today    14. Flu vaccine given today    15. Depression: Doing well with Lexapro    16. History of chronic DVT/hypercoagulable state: Continue Coumadin as prescribed    17. Sleep apnea: Patient noncompliant with her CPAP machine. Advised to use her CPAP as prescribed    18. Vitamin D deficiency: Check a vitamin D level with next lab draw    Pankaj Hernandez was seen today for 3 month follow-up. Diagnoses and all orders for this visit:    Type II diabetes mellitus with nephropathy ([facility]  -     CBC with Auto Differential; Future  -     Comprehensive Metabolic Panel; Future  -     Hemoglobin A1C; Future  -     Lipid Panel; Future  -     TSH; Future  -     Microalbumin / Creatinine Urine Ratio; Future  -     Urinalysis with Reflex to Culture;  Future    Neuropathy  -     gabapentin (NEURONTIN) 300 MG capsule; TAKE 1 CAPSULE BY MOUTH THREE TIMES DAILY  -     Diabetic Foot Exam  -     Vitamin B12; Future  - Cancel: OK VITAMIN B12 INJECTION    Need for vaccination  -     Influenza, FLUAD, (age 72 y+), IM, Preservative Free, 0.5 mL    Vitamin D deficiency  -     Vitamin D 25 Hydroxy; Future  -     cyanocobalamin injection 1,000 mcg    Foul smelling urine    Dysuria    History of total knee replacement, unspecified laterality    Iron deficiency anemia, unspecified iron deficiency anemia type    Gastroesophageal reflux disease without esophagitis    Primary hypertension    Hypothyroidism, unspecified type    Hyperlipidemia, unspecified hyperlipidemia type    Chronic kidney disease, unspecified CKD stage    Mild intermittent reactive airway disease without complication    Chronic low back pain, unspecified back pain laterality, unspecified whether sciatica present    B12 deficiency    Needs flu shot    Other depression    Chronic deep vein thrombosis (DVT) of proximal vein of lower extremity, unspecified laterality (HCC)    Hypercoagulable state ([facility] [address]    Sleep apnea, unspecified type        Return in about 3 months (around 1/5/2023).      Orders Placed This Encounter   Procedures    Influenza, FLUAD, (age 72 y+), IM, Preservative Free, 0.5 mL    Vitamin B12     Standing Status:   Future     Standing Expiration Date:   10/5/2023    Vitamin D 25 Hydroxy     Standing Status:   Future     Standing Expiration Date:   10/5/2023    CBC with Auto Differential     Fast 12 hours     Standing Status:   Future     Standing Expiration Date:   10/5/2023    Comprehensive Metabolic Panel     Fasting 12 hours     Standing Status:   Future     Standing Expiration Date:   10/5/2023    Hemoglobin A1C     Fast 12 hours     Standing Status:   Future     Standing Expiration Date:   10/5/2023    Lipid Panel     Standing Status:   Future     Standing Expiration Date:   10/5/2023     Order Specific Question:   Is Patient Fasting?/# of Hours     Answer:   12    TSH     Fast 12 hours     Standing Status:   Future     Standing Expiration Date: 10/5/2023    Microalbumin / Creatinine Urine Ratio     Standing Status:   Future     Standing Expiration Date:   10/5/2023    Urinalysis with Reflex to Culture     Standing Status:   Future     Standing Expiration Date:   10/5/2023     Order Specific Question:   SPECIFY(EX-CATH,MIDSTREAM,CYSTO,ETC)?      Answer:   clean catch    Diabetic Billye Gilford, MD

## 2022-10-05 NOTE — PROGRESS NOTES
After obtaining consent, and per orders of Dr. Mayra Denney , injection of HD Flu given in Left deltoid by Juanita Head MA. Patient instructed to remain in clinic for 20 minutes afterwards, and to report any adverse reaction to me immediately. After obtaining consent, and per orders of Dr. Mayra Denney, injection of B12 given in Right deltoid by Juanita Head MA. Patient instructed to remain in clinic for 20 minutes afterwards, and to report any adverse reaction to me immediately.

## 2022-10-07 ENCOUNTER — ANTI-COAG VISIT (OUTPATIENT)
Dept: INTERNAL MEDICINE | Age: 72
End: 2022-10-07
Payer: MEDICARE

## 2022-10-07 ENCOUNTER — TELEPHONE (OUTPATIENT)
Dept: INTERNAL MEDICINE | Age: 72
End: 2022-10-07

## 2022-10-07 DIAGNOSIS — Z79.01 ANTICOAGULATED ON COUMADIN: Primary | ICD-10-CM

## 2022-10-07 LAB — INR BLD: 1.5

## 2022-10-07 PROCEDURE — 93793 ANTICOAG MGMT PT WARFARIN: CPT | Performed by: INTERNAL MEDICINE

## 2022-10-07 PROCEDURE — 99999 PR OFFICE/OUTPT VISIT,PROCEDURE ONLY: CPT | Performed by: INTERNAL MEDICINE

## 2022-10-07 ASSESSMENT — ENCOUNTER SYMPTOMS
COUGH: 0
EYE ITCHING: 0
EYE DISCHARGE: 0
WHEEZING: 0
BACK PAIN: 1
SINUS PAIN: 0
STRIDOR: 0
TROUBLE SWALLOWING: 0
APNEA: 0
DIARRHEA: 0
SINUS PRESSURE: 0
ABDOMINAL DISTENTION: 0
NAUSEA: 0
ABDOMINAL PAIN: 0
CHEST TIGHTNESS: 0
SHORTNESS OF BREATH: 0
VOICE CHANGE: 0
COLOR CHANGE: 0
BLOOD IN STOOL: 0
CONSTIPATION: 0
SORE THROAT: 0
RHINORRHEA: 0
VOMITING: 0

## 2022-10-07 NOTE — TELEPHONE ENCOUNTER
Left voicemail with Alexia Moore to see if she left a sample. Called lab and they did run the urine for the microalbumin but they did not run the UA. Someone did a collection task on it and it said clinic collect, which is most likely why it did not get ran. The urine is too old to try to run for a UA at this point. Please advise if you want her to come back.

## 2022-10-07 NOTE — PROGRESS NOTES
HOME MONITORING REPORT    INR today:   Results for orders placed or performed in visit on 10/07/22   Protime-INR   Result Value Ref Range    INR 1.50        INR Goal: 2.0-3.0    Dosing Plan  As of 10/7/2022      TTR:  28.7 % (4.4 y)   Full warfarin instructions:  7.5 mg every Mon, Wed, Fri; 15 mg all other days                PLAN: Pt notified to take 15mg today and tomorrow. Pt ARACELY, she has also bee notified to call Rosario Velásquez to schedule in office.

## 2022-10-07 NOTE — TELEPHONE ENCOUNTER
Did we not get a urine specimen back on her? It looks like it says active and collected. She was complaining of some burning with urination.

## 2022-10-13 ENCOUNTER — ANTI-COAG VISIT (OUTPATIENT)
Dept: PRIMARY CARE CLINIC | Age: 72
End: 2022-10-13
Payer: MEDICARE

## 2022-10-13 DIAGNOSIS — Z79.01 ANTICOAGULATED ON COUMADIN: Primary | ICD-10-CM

## 2022-10-13 LAB — INTERNATIONAL NORMALIZATION RATIO, POC: 2.7

## 2022-10-13 PROCEDURE — 93793 ANTICOAG MGMT PT WARFARIN: CPT | Performed by: INTERNAL MEDICINE

## 2022-10-13 PROCEDURE — 85610 PROTHROMBIN TIME: CPT | Performed by: INTERNAL MEDICINE

## 2022-10-13 NOTE — PROGRESS NOTES
Ms. Omer Downs was here today. INR today:   Results for orders placed or performed in visit on 10/13/22   POCT INR   Result Value Ref Range    INR,(POC) 2.7      INR Goal: 2.0-3.0    Dosing Plan  As of 10/13/2022      TTR:  28.8 % (4.4 y)   Full warfarin instructions:  7.5 mg every day                AHMET AND HER GRANDDAUGHTER HAVE BEEN CHANGING HER DOSE. SHE IS NOW TAKING 7.5 MG A DAY. PLAN: CONTINUE CURRENT DOSE OF 7.5 MG A DAY. CONTINUE TO CHECK AT HOME WEEKLY, COUMADIN CLINIC APPOINTMENTS AS NEEDED. Coumadin Clinic Hours  Monday           8:00am - 4:00pm  Tuesday 8:00am - 4:00pm  Wednesday  10:00am - 4:00pm  Thursday 8:00am - 4:00pm    IF IT'S AN EMERGENCY, PLEASE CALL 911 OR GO TO YOUR NEAREST EMERGENCY ROOM. [address] [phone] (JOVANY'S DIRECT LINE)  IF UNABLE TO REACH COUMADIN CLINIC, [facility] [place] [facility],     INTERNAL  MEDICINE [phone].     PRIMARY CARE  [phone]    FAMILY MEDICINE  [phone]

## 2022-10-17 ENCOUNTER — TELEPHONE (OUTPATIENT)
Dept: CARDIOLOGY | Facility: CLINIC | Age: 72
End: 2022-10-17

## 2022-10-17 NOTE — TELEPHONE ENCOUNTER
Pt called left a  msg stating that she is not tolerating the Lopressor for it is causing lightheadedness and she feels like she is going to pass out.  Wants to know what to do?  Rafat Anthony, CMA

## 2022-10-18 NOTE — TELEPHONE ENCOUNTER
I called pt and told her what Marie said.  To take 1/2 of the Lopressor and check BP 1 hour after her AM medications.   Pt stated understanding.  She will call if she does not feel any better.  Rafat Anthony, CMA

## 2022-10-20 ENCOUNTER — ANTI-COAG VISIT (OUTPATIENT)
Dept: PRIMARY CARE CLINIC | Age: 72
End: 2022-10-20
Payer: MEDICARE

## 2022-10-20 DIAGNOSIS — Z79.01 ANTICOAGULATED ON COUMADIN: Primary | ICD-10-CM

## 2022-10-20 LAB
INR BLD: 2.5
INR BLD: 3

## 2022-10-20 PROCEDURE — 93793 ANTICOAG MGMT PT WARFARIN: CPT | Performed by: INTERNAL MEDICINE

## 2022-10-20 NOTE — PROGRESS NOTES
HOME MONITORING REPORT    INR today:   Results for orders placed or performed in visit on 10/20/22   Protime-INR   Result Value Ref Range    INR 2.50        INR Goal: 2.0-3.0    Dosing Plan  As of 10/20/2022      TTR:  29.1 % (4.4 y)   Full warfarin instructions:  7.5 mg every day                PLAN: Advised patient/caregiver to continue current dose and recheck in one week.   Patient/Caregiver voiced understanding

## 2022-10-25 ENCOUNTER — TELEPHONE (OUTPATIENT)
Dept: INTERNAL MEDICINE | Age: 72
End: 2022-10-25

## 2022-10-25 NOTE — TELEPHONE ENCOUNTER
BP Today - 132/88  158/108, 162/101, 170/97 over the last week. Homecare nurse called to see if the patient had reached out about her bp's being elevated and complains of vertigo with SOB. I adivsed the last time she was in was 10/5 &we did get a message on 10/10 and referred to ED but she did not go. She is faxing over her readings over the last week for your review.

## 2022-10-27 ENCOUNTER — ANTI-COAG VISIT (OUTPATIENT)
Dept: PRIMARY CARE CLINIC | Age: 72
End: 2022-10-27
Payer: MEDICARE

## 2022-10-27 DIAGNOSIS — Z79.01 ANTICOAGULATED ON COUMADIN: Primary | ICD-10-CM

## 2022-10-27 LAB — INR BLD: 2

## 2022-10-27 PROCEDURE — 93793 ANTICOAG MGMT PT WARFARIN: CPT | Performed by: INTERNAL MEDICINE

## 2022-10-27 NOTE — PROGRESS NOTES
HOME MONITORING REPORT    INR today:   Results for orders placed or performed in visit on 10/27/22   Protime-INR   Result Value Ref Range    INR 2.00        INR Goal: 2.0-3.0    Dosing Plan  As of 10/27/2022      TTR:  29.4 % (4.4 y)   Full warfarin instructions:  7.5 mg every day; Starting 10/27/2022                PLAN: Patient aware to continue current dose and recheck in one week or as ordered.

## 2022-11-02 ENCOUNTER — TELEPHONE (OUTPATIENT)
Dept: CARDIOLOGY | Facility: CLINIC | Age: 72
End: 2022-11-02

## 2022-11-02 NOTE — TELEPHONE ENCOUNTER
----- Message from Veronica REYNOLDSDouglas Stewart sent at 11/1/2022  3:43 PM CDT -----  Regarding: reaction from Lopressor?  Contact: [phone]  I am still feeling like a drunk or druged person. My balance is very off , my eyes are blurry and my doctor has put me back on my blood pressor medicine, lisinipril.  I get very short of breath and my legs feel likerubber bands.

## 2022-11-02 NOTE — TELEPHONE ENCOUNTER
I called pt to see if she has been checking her BP.  She said she checks it in the morning before she takes her medication.  Its been running 166//71.  Dr. Jackson put her on Lisinopril.  She said she feels that the Lopressor is causing her to be lightheaded and sob and having blurred vision.  Please advise.  Rafat Anthony, CMA

## 2022-11-02 NOTE — TELEPHONE ENCOUNTER
Pt informed to stop the Lopressor to see how she feels without it.  If symptoms go away we will know its from the medication.  If they remain she will need to be evaluated by her pcp to see if there is something else going on.  Pt stated understanding.  Rafat Anthony, [facility]

## 2022-11-04 ENCOUNTER — ANTI-COAG VISIT (OUTPATIENT)
Dept: INTERNAL MEDICINE | Age: 72
End: 2022-11-04
Payer: MEDICARE

## 2022-11-04 DIAGNOSIS — Z79.01 ANTICOAGULATED ON COUMADIN: Primary | ICD-10-CM

## 2022-11-04 LAB — INR BLD: 2.7

## 2022-11-04 PROCEDURE — 93793 ANTICOAG MGMT PT WARFARIN: CPT | Performed by: INTERNAL MEDICINE

## 2022-11-04 PROCEDURE — 99999 PR OFFICE/OUTPT VISIT,PROCEDURE ONLY: CPT | Performed by: INTERNAL MEDICINE

## 2022-11-04 NOTE — PROGRESS NOTES
HOME MONITORING REPORT    INR today:   Results for orders placed or performed in visit on 11/04/22   Protime-INR   Result Value Ref Range    INR 2.70        INR Goal: 2.0-3.0    Dosing Plan  As of 11/4/2022      TTR:  29.8 % (4.5 y)   Full warfarin instructions:  7.5 mg every day; Starting 11/4/2022                PLAN: Advised patient/caregiver to continue current dose and recheck in one week.   Patient/Caregiver voiced understanding

## 2022-11-07 ENCOUNTER — HOSPITAL ENCOUNTER (OUTPATIENT)
Dept: INFUSION THERAPY | Age: 72
Discharge: HOME | End: 2022-11-07
Payer: MEDICARE

## 2022-11-07 ENCOUNTER — OFFICE VISIT (OUTPATIENT)
Dept: HEMATOLOGY | Age: 72
End: 2022-11-07
Payer: MEDICARE

## 2022-11-07 VITALS
DIASTOLIC BLOOD PRESSURE: 84 MMHG | OXYGEN SATURATION: 98 % | HEART RATE: 78 BPM | BODY MASS INDEX: 35.55 KG/M2 | SYSTOLIC BLOOD PRESSURE: 136 MMHG | WEIGHT: 227 LBS

## 2022-11-07 DIAGNOSIS — D50.9 IRON DEFICIENCY ANEMIA, UNSPECIFIED IRON DEFICIENCY ANEMIA TYPE: ICD-10-CM

## 2022-11-07 DIAGNOSIS — D69.6 THROMBOCYTOPENIA (HCC): ICD-10-CM

## 2022-11-07 DIAGNOSIS — D50.9 IRON DEFICIENCY ANEMIA, UNSPECIFIED IRON DEFICIENCY ANEMIA TYPE: Primary | ICD-10-CM

## 2022-11-07 DIAGNOSIS — Z79.01 ANTICOAGULATED ON COUMADIN: ICD-10-CM

## 2022-11-07 DIAGNOSIS — E03.9 HYPOTHYROIDISM, UNSPECIFIED TYPE: ICD-10-CM

## 2022-11-07 LAB
ALBUMIN SERPL-MCNC: 4.7 G/DL (ref 3.5–5.2)
ALP BLD-CCNC: 74 U/L (ref 35–104)
ALT SERPL-CCNC: 19 U/L (ref 9–52)
ANION GAP SERPL CALCULATED.3IONS-SCNC: 11 MMOL/L (ref 7–19)
AST SERPL-CCNC: 42 U/L (ref 14–36)
BILIRUB SERPL-MCNC: 1.1 MG/DL (ref 0.2–1.3)
BUN BLDV-MCNC: 21 MG/DL (ref 7–17)
CALCIUM SERPL-MCNC: 9 MG/DL (ref 8.4–10.2)
CHLORIDE BLD-SCNC: 109 MMOL/L (ref 98–111)
CO2: 23 MMOL/L (ref 22–29)
CREAT SERPL-MCNC: 1.1 MG/DL (ref 0.5–1)
FERRITIN: 147.7 NG/ML (ref 13–150)
GFR SERPL CREATININE-BSD FRML MDRD: 53 ML/MIN/{1.73_M2}
GLOBULIN: 4.2 G/DL
GLUCOSE BLD-MCNC: 88 MG/DL (ref 74–106)
HCT VFR BLD CALC: 34.9 % (ref 34.1–44.9)
HEMOGLOBIN: 11 G/DL (ref 11.2–15.7)
IRON SATURATION: 24 % (ref 14–50)
IRON: 72 UG/DL (ref 37–145)
LYMPHOCYTES ABSOLUTE: 2.1 K/UL (ref 1.18–3.74)
LYMPHOCYTES RELATIVE PERCENT: 31.3 % (ref 19.3–53.1)
MCH RBC QN AUTO: 29.7 PG (ref 25.6–32.2)
MCHC RBC AUTO-ENTMCNC: 31.5 G/DL (ref 32.3–35.5)
MCV RBC AUTO: 94.3 FL (ref 79.4–94.8)
MONOCYTES ABSOLUTE: 0.9 K/UL (ref 0.24–0.82)
MONOCYTES RELATIVE PERCENT: 13.5 % (ref 4.7–12.5)
NEUTROPHILS ABSOLUTE: 3.6 K/UL (ref 1.56–6.13)
NEUTROPHILS RELATIVE PERCENT: 55.2 % (ref 34–71.1)
PDW BLD-RTO: 16.2 % (ref 11.7–14.4)
PLATELET # BLD: 167 K/UL (ref 182–369)
PMV BLD AUTO: 10.7 FL (ref 7.4–10.4)
POTASSIUM SERPL-SCNC: 5.5 MMOL/L (ref 3.5–5.1)
RBC # BLD: 3.7 M/UL (ref 3.93–5.22)
SODIUM BLD-SCNC: 143 MMOL/L (ref 137–145)
TOTAL IRON BINDING CAPACITY: 297 UG/DL (ref 250–400)
TOTAL PROTEIN: 8.9 G/DL (ref 6.3–8.2)
WBC # BLD: 6.6 K/UL (ref 3.98–10.04)

## 2022-11-07 PROCEDURE — G8427 DOCREV CUR MEDS BY ELIG CLIN: HCPCS | Performed by: NURSE PRACTITIONER

## 2022-11-07 PROCEDURE — 99212 OFFICE O/P EST SF 10 MIN: CPT

## 2022-11-07 PROCEDURE — 1036F TOBACCO NON-USER: CPT | Performed by: NURSE PRACTITIONER

## 2022-11-07 PROCEDURE — G8399 PT W/DXA RESULTS DOCUMENT: HCPCS | Performed by: NURSE PRACTITIONER

## 2022-11-07 PROCEDURE — 3017F COLORECTAL CA SCREEN DOC REV: CPT | Performed by: NURSE PRACTITIONER

## 2022-11-07 PROCEDURE — 3074F SYST BP LT 130 MM HG: CPT | Performed by: NURSE PRACTITIONER

## 2022-11-07 PROCEDURE — 36415 COLL VENOUS BLD VENIPUNCTURE: CPT

## 2022-11-07 PROCEDURE — 1090F PRES/ABSN URINE INCON ASSESS: CPT | Performed by: NURSE PRACTITIONER

## 2022-11-07 PROCEDURE — 1123F ACP DISCUSS/DSCN MKR DOCD: CPT | Performed by: NURSE PRACTITIONER

## 2022-11-07 PROCEDURE — 99213 OFFICE O/P EST LOW 20 MIN: CPT | Performed by: NURSE PRACTITIONER

## 2022-11-07 PROCEDURE — 85025 COMPLETE CBC W/AUTO DIFF WBC: CPT

## 2022-11-07 PROCEDURE — 3078F DIAST BP <80 MM HG: CPT | Performed by: NURSE PRACTITIONER

## 2022-11-07 PROCEDURE — G8417 CALC BMI ABV UP PARAM F/U: HCPCS | Performed by: NURSE PRACTITIONER

## 2022-11-07 PROCEDURE — 80053 COMPREHEN METABOLIC PANEL: CPT

## 2022-11-07 PROCEDURE — G8484 FLU IMMUNIZE NO ADMIN: HCPCS | Performed by: NURSE PRACTITIONER
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
## 2022-11-08 ENCOUNTER — TELEPHONE (OUTPATIENT)
Dept: INFUSION THERAPY | Age: 72
End: 2022-11-08

## 2022-11-08 NOTE — TELEPHONE ENCOUNTER
Called patient and reviewed lab results, K+ 5.5. Patient states that she is not on any potassium supplement at this time. State that she is eating a lot of baked potatoes. Instructed to cut back on potatoes at this time and that I will fax results to Dr Robson Lira. Patient v/u.

## 2022-11-08 NOTE — TELEPHONE ENCOUNTER
----- Message from CHANELLE Gutiérrez sent at 11/8/2022  7:09 AM CST -----  Please call [facility] and make sure she is not taking her potassium supplement, it appears to be discontinued on her med list.  Encouraged her to avoid potassium rich foods. Please fax lab results to Dr. Jayashree Finley for review and follow-up.

## 2022-11-10 ENCOUNTER — ANTI-COAG VISIT (OUTPATIENT)
Dept: PRIMARY CARE CLINIC | Age: 72
End: 2022-11-10
Payer: MEDICARE

## 2022-11-10 DIAGNOSIS — Z79.01 ANTICOAGULATED ON COUMADIN: Primary | ICD-10-CM

## 2022-11-10 LAB — INR BLD: 2.1

## 2022-11-10 PROCEDURE — 93793 ANTICOAG MGMT PT WARFARIN: CPT | Performed by: INTERNAL MEDICINE

## 2022-11-10 NOTE — PROGRESS NOTES
HOME MONITORING REPORT    INR today:   Results for orders placed or performed in visit on 11/10/22   Protime-INR   Result Value Ref Range    INR 2.10        INR Goal: 2.0-3.0    Dosing Plan  As of 11/10/2022      TTR:  30.0 % (4.5 y)   Full warfarin instructions:  7.5 mg every day; Starting 11/10/2022                PLAN: Advised patient/caregiver to continue current dose and recheck in one week.   Patient/Caregiver voiced understanding

## 2022-11-12 ASSESSMENT — ENCOUNTER SYMPTOMS
CONSTIPATION: 0
VOMITING: 0
WHEEZING: 0
ABDOMINAL PAIN: 0
SORE THROAT: 0
EYES NEGATIVE: 1
SHORTNESS OF BREATH: 0
COUGH: 0
DIARRHEA: 0
NAUSEA: 0
RESPIRATORY NEGATIVE: 1
EYE REDNESS: 0
BLOOD IN STOOL: 0
GASTROINTESTINAL NEGATIVE: 1
EYE PAIN: 0
BACK PAIN: 0
EYE DISCHARGE: 0

## 2022-11-17 LAB — INR BLD: 2.7

## 2022-11-18 ENCOUNTER — ANTI-COAG VISIT (OUTPATIENT)
Dept: INTERNAL MEDICINE | Age: 72
End: 2022-11-18
Payer: MEDICARE

## 2022-11-18 DIAGNOSIS — Z79.01 ANTICOAGULATED ON COUMADIN: Primary | ICD-10-CM

## 2022-11-18 PROCEDURE — 99999 PR OFFICE/OUTPT VISIT,PROCEDURE ONLY: CPT | Performed by: INTERNAL MEDICINE

## 2022-11-18 PROCEDURE — 93793 ANTICOAG MGMT PT WARFARIN: CPT | Performed by: INTERNAL MEDICINE

## 2022-11-18 NOTE — PROGRESS NOTES
HOME MONITORING REPORT    INR today:   Results for orders placed or performed in visit on 11/18/22   Protime-INR   Result Value Ref Range    INR 2.70        INR Goal: 2.0-3.0    Dosing Plan  As of 11/18/2022      TTR:  30.3 % (4.5 y)   Full warfarin instructions:  7.5 mg every day; Starting 11/18/2022                PLAN: Advised patient/caregiver to continue current dose and recheck in one week.   Patient/Caregiver voiced understanding

## 2022-11-28 ENCOUNTER — PATIENT MESSAGE (OUTPATIENT)
Dept: INTERNAL MEDICINE | Age: 72
End: 2022-11-28

## 2022-11-28 RX ORDER — DULAGLUTIDE 0.75 MG/.5ML
0.75 INJECTION, SOLUTION SUBCUTANEOUS WEEKLY
Qty: 4 ADJUSTABLE DOSE PRE-FILLED PEN SYRINGE | Refills: 2 | Status: SHIPPED | OUTPATIENT
Start: 2022-11-28

## 2022-11-28 NOTE — TELEPHONE ENCOUNTER
From: Blanco Baez  To: Dr. Michele Hemphilltin2022 2:08 PM CST  Subject: Changing from Ozempics toTrulicity    Could we switch from Ozempics to Trulicity? Trulicity is less expensive.   Thank You

## 2022-11-29 ENCOUNTER — PATIENT MESSAGE (OUTPATIENT)
Dept: CARDIOLOGY | Facility: CLINIC | Age: 72
End: 2022-11-29

## 2022-11-30 ENCOUNTER — TELEPHONE (OUTPATIENT)
Dept: CARDIOLOGY | Facility: CLINIC | Age: 72
End: 2022-11-30

## 2022-11-30 DIAGNOSIS — K52.9 GASTROENTERITIS: ICD-10-CM

## 2022-11-30 DIAGNOSIS — K21.9 GASTROESOPHAGEAL REFLUX DISEASE WITHOUT ESOPHAGITIS: ICD-10-CM

## 2022-11-30 RX ORDER — CALCITRIOL 0.25 UG/1
CAPSULE, LIQUID FILLED ORAL
Qty: 90 CAPSULE | Refills: 0 | Status: SHIPPED | OUTPATIENT
Start: 2022-11-30

## 2022-11-30 RX ORDER — SUCRALFATE 1 G/1
TABLET ORAL
Qty: 360 TABLET | Refills: 0 | Status: SHIPPED | OUTPATIENT
Start: 2022-11-30

## 2022-11-30 NOTE — TELEPHONE ENCOUNTER
Call placed to patient as thread was long. Patient is not taking Lopressor. She is taking Lisinopril prescribed Dr Jackson. I have advised to patient that if she needs any treatment r/t HTN to contact Dr Jackson. V/U.

## 2022-11-30 NOTE — TELEPHONE ENCOUNTER
Marie Mendez APRN 11/30/2022 8:01 AM CST    She needs to follow up with her PCP.   ----- Message -----  From: Izabela Robert MA  Sent: 11/29/2022 2:36 PM CST  To: CATRACHITO Chen  Subject: FW: Lopressor       ----- Message -----  From: Veronica Stewart  Sent: 11/29/2022 2:28 PM CST  To: [facility] Clinical Pool  Subject: Lopressor      I have been off of the lopressor for some time and I feel the same. What's next.

## 2022-11-30 NOTE — TELEPHONE ENCOUNTER
----- Message from Veronica Stewart sent at 11/30/2022  3:18 PM CST -----  Regarding: Lopressor  Contact: [phone]  Should I still not take the Lopressor?

## 2022-12-01 ENCOUNTER — ANTI-COAG VISIT (OUTPATIENT)
Dept: PRIMARY CARE CLINIC | Age: 72
End: 2022-12-01
Payer: MEDICARE

## 2022-12-01 DIAGNOSIS — Z79.01 ANTICOAGULATED ON COUMADIN: Primary | ICD-10-CM

## 2022-12-01 LAB — INR BLD: 3.3

## 2022-12-01 PROCEDURE — 93793 ANTICOAG MGMT PT WARFARIN: CPT | Performed by: INTERNAL MEDICINE

## 2022-12-01 NOTE — PROGRESS NOTES
HOME MONITORING REPORT    INR today:   Results for orders placed or performed in visit on 12/01/22   Protime-INR   Result Value Ref Range    INR 3.30        INR Goal: 2.0-3.0    Dosing Plan  As of 12/1/2022      TTR:  30.4 % (4.5 y)   Full warfarin instructions:  12/1: 3.75 mg; Otherwise 7.5 mg every day; Starting 12/1/2022            Patient entered the wrong date when she contacted [address]. PLAN: Advised patient/caregiver to decrease her dose tonight to 3.75 mg, then continue current dose and recheck in one week.   Patient/Caregiver voiced understanding

## 2022-12-07 ENCOUNTER — ANTI-COAG VISIT (OUTPATIENT)
Dept: PRIMARY CARE CLINIC | Age: 72
End: 2022-12-07
Payer: MEDICARE

## 2022-12-07 DIAGNOSIS — Z79.01 ANTICOAGULATED ON COUMADIN: Primary | ICD-10-CM

## 2022-12-07 LAB — INR BLD: 3.4

## 2022-12-07 PROCEDURE — 93793 ANTICOAG MGMT PT WARFARIN: CPT | Performed by: INTERNAL MEDICINE

## 2022-12-07 NOTE — PROGRESS NOTES
HOME MONITORING REPORT    INR today:   Results for orders placed or performed in visit on 12/07/22   Protime-INR   Result Value Ref Range    INR 3.40        INR Goal: 2.0-3.0    Dosing Plan  As of 12/7/2022      TTR:  30.3 % (4.6 y)   Full warfarin instructions:  12/7: 3.75 mg; Otherwise 7.5 mg every day; Starting 12/7/2022                PLAN: Advised patient/caregiver to reduce her dose tonight to 3.75  mg, then continue current dose and recheck in one week. Patient/Caregiver voiced understanding      I have reviewed nursing plan for Coumadin management and agree with plan.

## 2022-12-08 LAB — INR BLD: 2.8

## 2022-12-09 ENCOUNTER — ANTI-COAG VISIT (OUTPATIENT)
Dept: INTERNAL MEDICINE | Age: 72
End: 2022-12-09

## 2022-12-09 DIAGNOSIS — Z79.01 ANTICOAGULATED ON COUMADIN: Primary | ICD-10-CM

## 2022-12-09 NOTE — PROGRESS NOTES
HOME MONITORING REPORT    INR today:   Results for orders placed or performed in visit on 12/09/22   Protime-INR   Result Value Ref Range    INR 2.80        INR Goal: 2.0-3.0    Dosing Plan  As of 12/9/2022      TTR:  30.7 % (4.6 y)   Full warfarin instructions:  7.5 mg every day; Starting 12/9/2022                PLAN: Advised patient/caregiver to continue current dose and recheck in one week.   Patient/Caregiver voiced understanding

## 2022-12-15 LAB — INR BLD: 1.7

## 2022-12-16 ENCOUNTER — ANTI-COAG VISIT (OUTPATIENT)
Dept: INTERNAL MEDICINE | Age: 72
End: 2022-12-16
Payer: MEDICARE

## 2022-12-16 DIAGNOSIS — Z79.01 ANTICOAGULATED ON COUMADIN: Primary | ICD-10-CM

## 2022-12-16 PROCEDURE — 99999 PR OFFICE/OUTPT VISIT,PROCEDURE ONLY: CPT | Performed by: INTERNAL MEDICINE

## 2022-12-16 PROCEDURE — 93793 ANTICOAG MGMT PT WARFARIN: CPT | Performed by: INTERNAL MEDICINE

## 2022-12-16 NOTE — PROGRESS NOTES
HOME MONITORING REPORT    INR today:   Results for orders placed or performed in visit on 12/16/22   Protime-INR   Result Value Ref Range    INR 1.70        INR Goal: 2.0-3.0    Dosing Plan  As of 12/16/2022      TTR:  30.7 % (4.6 y)   Full warfarin instructions:  7.5 mg every day; Starting 12/16/2022                PLAN:   Take 15 mg of Coumadin tonight. Get her into the Coumadin clinic with Nitza Bajwa next week      Left Message for patient with the change and to call us back to get scheduled for the coumadin clinic.

## 2022-12-21 ENCOUNTER — ANTI-COAG VISIT (OUTPATIENT)
Dept: PRIMARY CARE CLINIC | Age: 72
End: 2022-12-21
Payer: MEDICARE

## 2022-12-21 DIAGNOSIS — Z79.01 ANTICOAGULATED ON COUMADIN: Primary | ICD-10-CM

## 2022-12-21 LAB — INR BLD: 2.9

## 2022-12-21 PROCEDURE — 93793 ANTICOAG MGMT PT WARFARIN: CPT | Performed by: INTERNAL MEDICINE

## 2022-12-21 NOTE — PROGRESS NOTES
HOME MONITORING REPORT    INR today:   Results for orders placed or performed in visit on 12/21/22   Protime-INR   Result Value Ref Range    INR 2.90        INR Goal: 2.0-3.0    Dosing Plan  As of 12/21/2022      TTR:  30.9 % (4.6 y)   Full warfarin instructions:  7.5 mg every day; Starting 12/21/2022            This result was received through the [employer] Portal.      PLAN: Advised patient/caregiver to continue current dose and recheck in one week.   Patient/Caregiver voiced understanding

## 2022-12-29 ENCOUNTER — ANTI-COAG VISIT (OUTPATIENT)
Dept: PRIMARY CARE CLINIC | Age: 72
End: 2022-12-29
Payer: MEDICARE

## 2022-12-29 DIAGNOSIS — Z79.01 ANTICOAGULATED ON COUMADIN: Primary | ICD-10-CM

## 2022-12-29 LAB — INR BLD: 2.9

## 2022-12-29 PROCEDURE — 93793 ANTICOAG MGMT PT WARFARIN: CPT | Performed by: INTERNAL MEDICINE

## 2023-01-04 ENCOUNTER — ANTI-COAG VISIT (OUTPATIENT)
Dept: PRIMARY CARE CLINIC | Age: 73
End: 2023-01-04
Payer: COMMERCIAL

## 2023-01-04 DIAGNOSIS — Z79.01 ANTICOAGULATED ON COUMADIN: Primary | ICD-10-CM

## 2023-01-04 LAB — INR BLD: 1.2

## 2023-01-04 PROCEDURE — 93793 ANTICOAG MGMT PT WARFARIN: CPT | Performed by: INTERNAL MEDICINE

## 2023-01-04 NOTE — PROGRESS NOTES
HOME MONITORING REPORT    INR today:   Results for orders placed or performed in visit on 01/04/23   Protime-INR   Result Value Ref Range    INR 1.20        INR Goal: 2.0-3.0    Dosing Plan  As of 1/4/2023      TTR:  31.2 % (4.6 y)   Full warfarin instructions:  1/4: 15 mg; Otherwise 7.5 mg every day; Starting 1/4/2023            This result was received through the [employer] [facility] Portal.    Leny does not think she missed any doses in the past week, her granddaughter fills her box with a seven day supply.    PLAN: Advised patient/caregiver to take 15 mg tonight, then continue current dose and recheck in on Monday 1/9.  Patient/Caregiver voiced understanding      Electronically signed by Regina Curtis MD on 1/4/2023 at 12:22 PM

## 2023-01-06 ENCOUNTER — CARE COORDINATION (OUTPATIENT)
Dept: CARE COORDINATION | Age: 73
End: 2023-01-06

## 2023-01-06 NOTE — CARE COORDINATION
ACM attempted contact with patient and unable to reach. Left voicemail with my cell number for call back. Will await return call from patient.       Kayce Justice 15, [phone]

## 2023-01-09 ENCOUNTER — ANTI-COAG VISIT (OUTPATIENT)
Dept: PRIMARY CARE CLINIC | Age: 73
End: 2023-01-09
Payer: COMMERCIAL

## 2023-01-09 ENCOUNTER — CARE COORDINATION (OUTPATIENT)
Dept: CARE COORDINATION | Age: 73
End: 2023-01-09

## 2023-01-09 DIAGNOSIS — Z79.01 ANTICOAGULATED ON COUMADIN: Primary | ICD-10-CM

## 2023-01-09 LAB — INR BLD: 1.9

## 2023-01-09 PROCEDURE — 93793 ANTICOAG MGMT PT WARFARIN: CPT | Performed by: INTERNAL MEDICINE

## 2023-01-09 NOTE — PROGRESS NOTES
HOME MONITORING REPORT    INR today:   Results for orders placed or performed in visit on 01/09/23   Protime-INR   Result Value Ref Range    INR 1.90        INR Goal: 2.0-3.0    Dosing Plan  As of 1/9/2023      TTR:  31.1 % (4.7 y)   Full warfarin instructions:  1/9: 11.25 mg; Otherwise 7.5 mg every day; Starting 1/9/2023              This result was received through the [employer] Portal.    PLAN: Advised patient/caregiver to increase her dose tonight to 11.25 mg, then continue current dose and recheck in one week.   Patient/Caregiver voiced understanding    Electronically signed by Cherri Sanchez MD on 1/9/2023 at 2:34 PM

## 2023-01-09 NOTE — CARE COORDINATION
ACM attempted contact with patient and unable to reach. Left voicemail with my cell number for call back. Will await return call from patient.       Kayce Justice 65, [phone] [place]

## 2023-01-12 ENCOUNTER — CARE COORDINATION (OUTPATIENT)
Dept: CARE COORDINATION | Age: 73
End: 2023-01-12

## 2023-01-12 NOTE — CARE COORDINATION
Ambulatory Care Nurse contacted the family via telephone to perform admission intake assessment for ambulatory care management. ACM Verified name and  with family as identifiers. Provided introduction to self, and explanation of the ACM role. ACM spoke with patient who states she recently switched health care plans. Patient states she is in the process of finding new living arrangements as her family was given a thirty day notice. ACM was unable to have an in depth conversation as patient was currently at work and unable. Patient requested a return phone call. Identified needs:  -Daughter is primary lessee- ACM provided information to [facility]. - Three or four bedroom home   - Information on current medicare plan- ACM provided information to [facility]  - self-monitoring     Education provided during encounter  - Self-monitoring  -Reporting new or worsening symptoms        Plan     - ACM to follow-up with patient contact local resources.      Kayce Justice 58, [phone]

## 2023-01-13 NOTE — PROGRESS NOTES
HOME MONITORING REPORT    INR today:   Results for orders placed or performed in visit on 05/20/22   Protime-INR   Result Value Ref Range    INR 3.20        INR Goal: 2.0-3.0    Dosing Plan  As of 5/20/2022    TTR:  26.8 % (4 y)   Full warfarin instructions:  7.5 mg every Mon, Wed, Fri; 15 mg all other days              PLAN: Hold her coumadin tonight. Resume current dose tomorrow.  Check on Monday [facility]

## 2023-01-18 ENCOUNTER — ANTI-COAG VISIT (OUTPATIENT)
Dept: PRIMARY CARE CLINIC | Age: 73
End: 2023-01-18
Payer: COMMERCIAL

## 2023-01-18 DIAGNOSIS — Z79.01 ANTICOAGULATED ON COUMADIN: Primary | ICD-10-CM

## 2023-01-18 LAB — INR BLD: 2.1

## 2023-01-18 PROCEDURE — 93793 ANTICOAG MGMT PT WARFARIN: CPT | Performed by: INTERNAL MEDICINE

## 2023-01-18 NOTE — PROGRESS NOTES
HOME MONITORING REPORT    INR today:   Results for orders placed or performed in visit on 01/18/23   Protime-INR   Result Value Ref Range    INR 2.10        INR Goal: 2.0-3.0    Dosing Plan  As of 1/18/2023      TTR:  31.3 % (4.7 y)   Full warfarin instructions:  7.5 mg every day; Starting 1/18/2023            This result was received through the [employer] Portal.      PLAN: Advised patient/caregiver to continue current dose and recheck in one week.   Patient/Caregiver voiced understanding

## 2023-01-20 ENCOUNTER — CARE COORDINATION (OUTPATIENT)
Dept: CARE COORDINATION | Age: 73
End: 2023-01-20

## 2023-01-20 NOTE — CARE COORDINATION
ACM attempted contact with patient and unable to reach. Left voicemail with my cell number for call back. Will await return call from patient.         Kayce Justice 11, [phone] [place]

## 2023-01-24 DIAGNOSIS — D50.9 IRON DEFICIENCY ANEMIA, UNSPECIFIED IRON DEFICIENCY ANEMIA TYPE: Primary | ICD-10-CM

## 2023-01-24 RX ORDER — QUINIDINE GLUCONATE 324 MG
TABLET, EXTENDED RELEASE ORAL
Qty: 180 TABLET | Refills: 0 | Status: SHIPPED | OUTPATIENT
Start: 2023-01-24

## 2023-01-25 RX ORDER — LEVOTHYROXINE SODIUM 0.07 MG/1
75 TABLET ORAL DAILY
Qty: 30 TABLET | Refills: 3 | Status: SHIPPED | OUTPATIENT
Start: 2023-01-25

## 2023-01-25 NOTE — TELEPHONE ENCOUNTER
Christelle Edmonds called to request a refill on her medication.       Last office visit : 10/5/2022   Next office visit : 2/3/2023     Requested Prescriptions     Pending Prescriptions Disp Refills    levothyroxine (SYNTHROID) 75 MCG tablet 30 tablet 3     Sig: Take 1 tablet by mouth Daily            Ailin Her MA

## 2023-01-26 ENCOUNTER — ANTI-COAG VISIT (OUTPATIENT)
Dept: PRIMARY CARE CLINIC | Age: 73
End: 2023-01-26
Payer: COMMERCIAL

## 2023-01-26 DIAGNOSIS — Z79.01 ANTICOAGULATED ON COUMADIN: Primary | ICD-10-CM

## 2023-01-26 LAB — INR BLD: 2.3

## 2023-01-26 PROCEDURE — 93793 ANTICOAG MGMT PT WARFARIN: CPT | Performed by: INTERNAL MEDICINE

## 2023-01-26 NOTE — PROGRESS NOTES
HOME MONITORING REPORT    INR today:   Results for orders placed or performed in visit on 01/26/23   Protime-INR   Result Value Ref Range    INR 2.30        INR Goal: 2.0-3.0    Dosing Plan  As of 1/26/2023      TTR:  31.6 % (4.7 y)   Full warfarin instructions:  7.5 mg every day; Starting 1/26/2023            This result was received through the [employer] Portal.      PLAN: Advised patient/caregiver to continue current dose and recheck in one week.   Patient/Caregiver voiced understanding

## 2023-01-27 ENCOUNTER — CARE COORDINATION (OUTPATIENT)
Dept: CARE COORDINATION | Age: 73
End: 2023-01-27

## 2023-01-27 NOTE — CARE COORDINATION
ACM attempted contact with patient and unable to reach. Left voicemail with my cell number for call back. Will await return call from patient.       Kayce Justice 87, [address] [place]

## 2023-02-01 ENCOUNTER — CARE COORDINATION (OUTPATIENT)
Dept: CARE COORDINATION | Age: 73
End: 2023-02-01

## 2023-02-02 ENCOUNTER — ANTI-COAG VISIT (OUTPATIENT)
Dept: PRIMARY CARE CLINIC | Age: 73
End: 2023-02-02
Payer: COMMERCIAL

## 2023-02-02 DIAGNOSIS — Z79.01 ANTICOAGULATED ON COUMADIN: Primary | ICD-10-CM

## 2023-02-02 LAB — INR BLD: 1.3

## 2023-02-02 PROCEDURE — 93793 ANTICOAG MGMT PT WARFARIN: CPT | Performed by: INTERNAL MEDICINE

## 2023-02-02 NOTE — PROGRESS NOTES
HOME MONITORING REPORT    INR today:   Results for orders placed or performed in visit on 02/02/23   Protime-INR   Result Value Ref Range    INR 1.30        INR Goal: 2.0-3.0    Dosing Plan  As of 2/2/2023      TTR:  31.5 % (4.7 y)   Full warfarin instructions:  2/2: 11.25 mg; 2/3: 11.25 mg; Otherwise 7.5 mg every day; Starting 2/2/2023            Patient missed multiple doses this week. PLAN: Advised patient/caregiver to take 11.25 mg for the next 2 days, then continue current dose of 7.5 mg a day and recheck in one week.   Patient/Caregiver voiced understanding

## 2023-02-03 ENCOUNTER — OFFICE VISIT (OUTPATIENT)
Dept: INTERNAL MEDICINE | Age: 73
End: 2023-02-03

## 2023-02-03 VITALS
SYSTOLIC BLOOD PRESSURE: 128 MMHG | OXYGEN SATURATION: 99 % | TEMPERATURE: 97.5 F | WEIGHT: 232 LBS | BODY MASS INDEX: 36.34 KG/M2 | HEART RATE: 78 BPM | DIASTOLIC BLOOD PRESSURE: 72 MMHG

## 2023-02-03 DIAGNOSIS — I48.0 PAROXYSMAL ATRIAL FIBRILLATION (HCC): ICD-10-CM

## 2023-02-03 DIAGNOSIS — E11.21 TYPE II DIABETES MELLITUS WITH NEPHROPATHY (HCC): ICD-10-CM

## 2023-02-03 DIAGNOSIS — I20.8 STABLE ANGINA (HCC): ICD-10-CM

## 2023-02-03 DIAGNOSIS — E55.9 VITAMIN D DEFICIENCY: ICD-10-CM

## 2023-02-03 DIAGNOSIS — K21.00 GASTROESOPHAGEAL REFLUX DISEASE WITH ESOPHAGITIS WITHOUT HEMORRHAGE: ICD-10-CM

## 2023-02-03 DIAGNOSIS — D68.59 HYPERCOAGULABLE STATE (HCC): ICD-10-CM

## 2023-02-03 DIAGNOSIS — L98.492 SKIN ULCER OF UPPER ARM, WITH FAT LAYER EXPOSED (HCC): ICD-10-CM

## 2023-02-03 DIAGNOSIS — E53.8 B12 DEFICIENCY: ICD-10-CM

## 2023-02-03 DIAGNOSIS — J44.9 CHRONIC OBSTRUCTIVE PULMONARY DISEASE, UNSPECIFIED COPD TYPE (HCC): ICD-10-CM

## 2023-02-03 DIAGNOSIS — I10 PRIMARY HYPERTENSION: ICD-10-CM
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
## 2023-02-03 DIAGNOSIS — E11.69 TYPE 2 DIABETES MELLITUS WITH OTHER SPECIFIED COMPLICATION, UNSPECIFIED WHETHER LONG TERM INSULIN USE (HCC): ICD-10-CM

## 2023-02-03 DIAGNOSIS — I49.5 SSS (SICK SINUS SYNDROME) (HCC): ICD-10-CM

## 2023-02-03 DIAGNOSIS — N18.9 ACUTE KIDNEY INJURY SUPERIMPOSED ON CHRONIC KIDNEY DISEASE (HCC): ICD-10-CM

## 2023-02-03 DIAGNOSIS — I82.4Y9 DVT, LOWER EXTREMITY, PROXIMAL, ACUTE, UNSPECIFIED LATERALITY (HCC): ICD-10-CM

## 2023-02-03 DIAGNOSIS — I47.1 PAROXYSMAL SVT (SUPRAVENTRICULAR TACHYCARDIA) (HCC): ICD-10-CM

## 2023-02-03 DIAGNOSIS — D69.6 THROMBOCYTOPENIA (HCC): ICD-10-CM

## 2023-02-03 DIAGNOSIS — G89.29 CHRONIC LOW BACK PAIN, UNSPECIFIED BACK PAIN LATERALITY, UNSPECIFIED WHETHER SCIATICA PRESENT: ICD-10-CM

## 2023-02-03 DIAGNOSIS — F33.2 SEVERE EPISODE OF RECURRENT MAJOR DEPRESSIVE DISORDER, WITHOUT PSYCHOTIC FEATURES (HCC): ICD-10-CM

## 2023-02-03 DIAGNOSIS — N17.9 ACUTE KIDNEY INJURY SUPERIMPOSED ON CHRONIC KIDNEY DISEASE (HCC): ICD-10-CM

## 2023-02-03 DIAGNOSIS — G62.9 NEUROPATHY: ICD-10-CM

## 2023-02-03 DIAGNOSIS — Z91.09 ENVIRONMENTAL ALLERGIES: ICD-10-CM

## 2023-02-03 DIAGNOSIS — E66.01 MORBID OBESITY DUE TO EXCESS CALORIES (HCC): ICD-10-CM

## 2023-02-03 DIAGNOSIS — D68.62 LUPUS ANTICOAGULANT DISORDER (HCC): ICD-10-CM

## 2023-02-03 DIAGNOSIS — N18.30 STAGE 3 CHRONIC KIDNEY DISEASE, UNSPECIFIED WHETHER STAGE 3A OR 3B CKD (HCC): ICD-10-CM

## 2023-02-03 DIAGNOSIS — E03.9 HYPOTHYROIDISM, UNSPECIFIED TYPE: ICD-10-CM

## 2023-02-03 DIAGNOSIS — I82.5Y9 CHRONIC DEEP VEIN THROMBOSIS (DVT) OF PROXIMAL VEIN OF LOWER EXTREMITY, UNSPECIFIED LATERALITY (HCC): ICD-10-CM

## 2023-02-03 DIAGNOSIS — K29.70 GASTRITIS WITHOUT BLEEDING, UNSPECIFIED CHRONICITY, UNSPECIFIED GASTRITIS TYPE: ICD-10-CM

## 2023-02-03 DIAGNOSIS — G62.9 PERIPHERAL POLYNEUROPATHY: ICD-10-CM

## 2023-02-03 DIAGNOSIS — N18.31 STAGE 3A CHRONIC KIDNEY DISEASE (HCC): ICD-10-CM

## 2023-02-03 DIAGNOSIS — F32.89 OTHER DEPRESSION: ICD-10-CM

## 2023-02-03 DIAGNOSIS — D50.9 IRON DEFICIENCY ANEMIA, UNSPECIFIED IRON DEFICIENCY ANEMIA TYPE: ICD-10-CM

## 2023-02-03 DIAGNOSIS — G47.30 SLEEP APNEA, UNSPECIFIED TYPE: ICD-10-CM

## 2023-02-03 DIAGNOSIS — M54.50 CHRONIC LOW BACK PAIN, UNSPECIFIED BACK PAIN LATERALITY, UNSPECIFIED WHETHER SCIATICA PRESENT: ICD-10-CM

## 2023-02-03 DIAGNOSIS — H35.029: ICD-10-CM

## 2023-02-03 DIAGNOSIS — E78.5 HYPERLIPIDEMIA, UNSPECIFIED HYPERLIPIDEMIA TYPE: ICD-10-CM

## 2023-02-03 LAB
ALBUMIN SERPL-MCNC: 4.3 G/DL (ref 3.5–5.2)
ALP BLD-CCNC: 69 U/L (ref 35–104)
ALT SERPL-CCNC: 15 U/L (ref 5–33)
ANION GAP SERPL CALCULATED.3IONS-SCNC: 12 MMOL/L (ref 7–19)
AST SERPL-CCNC: 30 U/L (ref 5–32)
BASOPHILS ABSOLUTE: 0 K/UL (ref 0–0.2)
BASOPHILS RELATIVE PERCENT: 0.6 % (ref 0–1)
BILIRUB SERPL-MCNC: 0.3 MG/DL (ref 0.2–1.2)
BUN BLDV-MCNC: 45 MG/DL (ref 8–23)
CALCIUM SERPL-MCNC: 8.9 MG/DL (ref 8.8–10.2)
CHLORIDE BLD-SCNC: 105 MMOL/L (ref 98–111)
CHOLESTEROL, TOTAL: 177 MG/DL (ref 160–199)
CO2: 23 MMOL/L (ref 22–29)
CREAT SERPL-MCNC: 1.5 MG/DL (ref 0.5–0.9)
CREATININE URINE: 211.6 MG/DL (ref 4.2–622)
EOSINOPHILS ABSOLUTE: 0.1 K/UL (ref 0–0.6)
EOSINOPHILS RELATIVE PERCENT: 1.6 % (ref 0–5)
GFR SERPL CREATININE-BSD FRML MDRD: 36 ML/MIN/{1.73_M2}
GLUCOSE BLD-MCNC: 75 MG/DL (ref 74–109)
HBA1C MFR BLD: 5.3 % (ref 4–6)
HCT VFR BLD CALC: 33.5 % (ref 37–47)
HDLC SERPL-MCNC: 103 MG/DL (ref 65–121)
HEMOGLOBIN: 10.4 G/DL (ref 12–16)
IMMATURE GRANULOCYTES #: 0 K/UL
LDL CHOLESTEROL CALCULATED: 65 MG/DL
LYMPHOCYTES ABSOLUTE: 1.5 K/UL (ref 1.1–4.5)
LYMPHOCYTES RELATIVE PERCENT: 28.9 % (ref 20–40)
MCH RBC QN AUTO: 29.3 PG (ref 27–31)
MCHC RBC AUTO-ENTMCNC: 31 G/DL (ref 33–37)
MCV RBC AUTO: 94.4 FL (ref 81–99)
MICROALBUMIN UR-MCNC: <1.2 MG/DL (ref 0–19)
MICROALBUMIN/CREAT UR-RTO: NORMAL MG/G
MONOCYTES ABSOLUTE: 0.6 K/UL (ref 0–0.9)
MONOCYTES RELATIVE PERCENT: 11.9 % (ref 0–10)
NEUTROPHILS ABSOLUTE: 2.9 K/UL (ref 1.5–7.5)
NEUTROPHILS RELATIVE PERCENT: 56.6 % (ref 50–65)
PDW BLD-RTO: 17.3 % (ref 11.5–14.5)
PLATELET # BLD: 184 K/UL (ref 130–400)
PMV BLD AUTO: 12.2 FL (ref 9.4–12.3)
POTASSIUM SERPL-SCNC: 5.3 MMOL/L (ref 3.5–5)
RBC # BLD: 3.55 M/UL (ref 4.2–5.4)
SODIUM BLD-SCNC: 140 MMOL/L (ref 136–145)
TOTAL PROTEIN: 7.5 G/DL (ref 6.6–8.7)
TRIGL SERPL-MCNC: 46 MG/DL (ref 0–149)
TSH SERPL DL<=0.05 MIU/L-ACNC: 1.53 UIU/ML (ref 0.27–4.2)
VITAMIN B-12: >2000 PG/ML (ref 211–946)
VITAMIN D 25-HYDROXY: 32.8 NG/ML
WBC # BLD: 5.1 K/UL (ref 4.8–10.8)

## 2023-02-03 RX ORDER — QUINIDINE GLUCONATE 324 MG
TABLET, EXTENDED RELEASE ORAL
Qty: 180 TABLET | Refills: 0 | Status: SHIPPED | OUTPATIENT
Start: 2023-02-03

## 2023-02-03 RX ORDER — AMOXICILLIN AND CLAVULANATE POTASSIUM 875; 125 MG/1; MG/1
1 TABLET, FILM COATED ORAL 2 TIMES DAILY
Qty: 20 TABLET | Refills: 0 | Status: SHIPPED | OUTPATIENT
Start: 2023-02-03 | End: 2023-02-13

## 2023-02-03 RX ORDER — CYANOCOBALAMIN 1000 UG/ML
1000 INJECTION, SOLUTION INTRAMUSCULAR; SUBCUTANEOUS ONCE
Status: COMPLETED | OUTPATIENT
Start: 2023-02-03 | End: 2023-02-03

## 2023-02-03 RX ORDER — ARIPIPRAZOLE 5 MG/1
5 TABLET ORAL DAILY
Qty: 30 TABLET | Refills: 3 | Status: SHIPPED | OUTPATIENT
Start: 2023-02-03

## 2023-02-03 RX ADMIN — CYANOCOBALAMIN 1000 MCG: 1000 INJECTION, SOLUTION INTRAMUSCULAR; SUBCUTANEOUS at 11:47

## 2023-02-03 SDOH — ECONOMIC STABILITY: FOOD INSECURITY: WITHIN THE PAST 12 MONTHS, THE FOOD YOU BOUGHT JUST DIDN'T LAST AND YOU DIDN'T HAVE MONEY TO GET MORE.: NEVER TRUE

## 2023-02-03 SDOH — ECONOMIC STABILITY: HOUSING INSECURITY
IN THE LAST 12 MONTHS, WAS THERE A TIME WHEN YOU DID NOT HAVE A STEADY PLACE TO SLEEP OR SLEPT IN A SHELTER (INCLUDING NOW)?: NO

## 2023-02-03 SDOH — ECONOMIC STABILITY: INCOME INSECURITY: HOW HARD IS IT FOR YOU TO PAY FOR THE VERY BASICS LIKE FOOD, HOUSING, MEDICAL CARE, AND HEATING?: NOT HARD AT ALL

## 2023-02-03 SDOH — ECONOMIC STABILITY: FOOD INSECURITY: WITHIN THE PAST 12 MONTHS, YOU WORRIED THAT YOUR FOOD WOULD RUN OUT BEFORE YOU GOT MONEY TO BUY MORE.: NEVER TRUE

## 2023-02-03 ASSESSMENT — PATIENT HEALTH QUESTIONNAIRE - PHQ9
SUM OF ALL RESPONSES TO PHQ QUESTIONS 1-9: 15
9. THOUGHTS THAT YOU WOULD BE BETTER OFF DEAD, OR OF HURTING YOURSELF: 0
2. FEELING DOWN, DEPRESSED OR HOPELESS: 3
4. FEELING TIRED OR HAVING LITTLE ENERGY: 3
SUM OF ALL RESPONSES TO PHQ QUESTIONS 1-9: 15
SUM OF ALL RESPONSES TO PHQ QUESTIONS 1-9: 15
SUM OF ALL RESPONSES TO PHQ9 QUESTIONS 1 & 2: 6
6. FEELING BAD ABOUT YOURSELF - OR THAT YOU ARE A FAILURE OR HAVE LET YOURSELF OR YOUR FAMILY DOWN: 0
10. IF YOU CHECKED OFF ANY PROBLEMS, HOW DIFFICULT HAVE THESE PROBLEMS MADE IT FOR YOU TO DO YOUR WORK, TAKE CARE OF THINGS AT HOME, OR GET ALONG WITH OTHER PEOPLE: 0
3. TROUBLE FALLING OR STAYING ASLEEP: 3
7. TROUBLE CONCENTRATING ON THINGS, SUCH AS READING THE NEWSPAPER OR WATCHING TELEVISION: 0
1. LITTLE INTEREST OR PLEASURE IN DOING THINGS: 3
5. POOR APPETITE OR OVEREATING: 3
SUM OF ALL RESPONSES TO PHQ QUESTIONS 1-9: 15
8. MOVING OR SPEAKING SO SLOWLY THAT OTHER PEOPLE COULD HAVE NOTICED. OR THE OPPOSITE, BEING SO FIGETY OR RESTLESS THAT YOU HAVE BEEN MOVING AROUND A LOT MORE THAN USUAL: 0

## 2023-02-03 NOTE — PROGRESS NOTES
After obtaining consent, and per orders of Dr. Dilma Caballero, injection of b12 given in Left upper quad. gluteus by Marian Montes MA. Patient instructed to remain in clinic for 20 minutes afterwards, and to report any adverse reaction to me immediately.

## 2023-02-03 NOTE — PROGRESS NOTES
Chief Complaint   Patient presents with    Follow-up       HPI: Stacey Nation is a 67 y.o. female is here for follow-up of type 2 diabetes, evaluation of a dog bite anemia, depression, hypothyroidism, gastritis, peripheral neuropathy, chronic kidney disease, hyperlipidemia sleep apnea, and environmental allergies. She has several dog bites to her right arm. At this time, they do not appear to be infected. However, she states that she was bit by her pitbull about 2 days ago. We can treat her Augmentin for this. Her blood work is currently pending. Her blood sugars are running between 86 and 110. She does complain of some neuropathic pain. Gabapentin is helpful. She supposed to be using her CPAP machine. Her, she has not been doing so. She states that she forgets to use it at night. She has a long history of noncompliance with her medication regimen. She also has a history of B12 deficiency. She has not had a B12 injection in 2 months. She is requesting a B12 shot today. She does complain of fatigue. I did explain to her that her CPAP may help with some of the symptoms of fatigue. Her daughter is with her today and is encouraging her to use her CPAP machine. She is taking her ferrous gluconate as prescribed. She does complain of some depression. She has maxed out on her dose of Lexapro. She is agreeable to trying Abilify. She is under a lot of stress at home. She has a history of hypercoagulability, positive lupus anticoagulant and coat syndrome. She has been somewhat noncompliant with her Coumadin in the past.  When she recently had her INR checked, she admitted that she had not had her Coumadin for several days. Again, the importance of taking her Coumadin on a regular basis was reiterated with her and her daughter today    Her allergies are stable. Her acid reflux is relatively well controlled on Carafate and Protonix. She is tolerating her Crestor without side effects.   She does take baclofen as needed for back pain.     Past Medical History:   Diagnosis Date    Abdominal pain     Abnormal EKG     Acute sinusitis     Acute superficial venous thrombosis of left lower extremity     Allergic reaction to spider bite     Anemia     Anticoagulated     Anxiety     Arrhythmia     Asthmatic bronchitis without complication 7/20/6369    Ataxic gait     Lyons's esophagus     Bowel obstruction (HCC)     Burn of abdomen wall, second degree, initial encounter 8/27/2018    Callus     Cardiac pacemaker     Cerebral artery occlusion with cerebral infarction [facility])     CKD (chronic kidney disease) stage 2, GFR 60-89 ml/min     Coat's syndrome     Coat's syndrome     both eyes    COPD (chronic obstructive pulmonary disease) (HCC)     Depression     Diabetes mellitus type 2 in nonobese (HCC)     Diabetic nephropathy (HCC)     Disequilibrium     Dizziness     DVT (deep venous thrombosis) (HCC)     Exudative retinopathy     Fibromyalgia     Fibromyositis     Gastric ulcer     GERD (gastroesophageal reflux disease)     History of gastric bypass     Hx of blood clots     Hx of lupus anticoagulant disorder     Hyperlipidemia 5/6/2019    Hypertension     Hypothyroidism     Intermittent claudication (HCC)     Intestinal obstruction (HCC)     Iron deficiency     Left-sided weakness     Low vitamin D level     Lupus ([address]     Menopause     Obesity     Osteoarthritis     Osteoporosis     Other iron deficiency anemias     Palliative care patient 09/24/2020    Pernicious anemia     PONV (postoperative nausea and vomiting)     PUPP (pruritic urticarial papules and plaques of pregnancy)     Restless legs syndrome (RLS) 7/11/2019    Right leg numbness     Right sided sciatica     Sarcoidosis     with liver involvement    Sciatica     Secondary hyperparathyroidism ([address]     Shingles     Shortness of breath     Sleep apnea     Stomach ulcer     Syncope     Visual loss, one eye       Past Surgical History:   Procedure Laterality Date    APPENDECTOMY      CARDIAC CATHETERIZATION  10/21/13  [facility]    EF over 60%    CHOLECYSTECTOMY      COLONOSCOPY  02/2010    negative    COLONOSCOPY  02/22/2010    Dr Dennis Luevano    COLONOSCOPY  04/01/2016    Dr LAKHWINDER Horvath-internal hemorrhoids, 5 yr recall    COLONOSCOPY N/A 05/07/2021    Dr Joby Reid looping/tortuosity throughout the left colon, BE=Normal    DILATATION, ESOPHAGUS      EYE SURGERY      Cyst on Right eye    EYE SURGERY      GASTRIC BYPASS SURGERY      GASTRIC BYPASS SURGERY      HERNIA REPAIR      HYSTERECTOMY (CERVIX STATUS UNKNOWN)  1974    Complete    INCONTINENCE SURGERY      Bladder Sling    INFECTED SKIN DEBRIDEMENT Right     dog bite right forearm    OTHER SURGICAL HISTORY      IVC filter    PACEMAKER INSERTION      PACEMAKER PLACEMENT      medtronic    CT TOTAL KNEE ARTHROPLASTY Right 03/26/2018    TOTAL KNEE REPLACEMENT COMPLEX PRIMARY performed by Sharon Hutchinson MD at [address] [facility] [address] BSO (CERVIX REMOVED) Bilateral 1974    age 21    TONSILLECTOMY AND ADENOIDECTOMY      TOTAL KNEE ARTHROPLASTY Left 07/19/2022    UPPER GASTROINTESTINAL ENDOSCOPY  12/2011    gerd s/p gastric bypass    UPPER GASTROINTESTINAL ENDOSCOPY  02/2014    normal s/p gastric bypass    UPPER GASTROINTESTINAL ENDOSCOPY  02/2010    biopsy neg Barretts, chronic reflux esophagitis s/p gastric bypass    UPPER GASTROINTESTINAL ENDOSCOPY  07/2006    unremarkable s/p gastric bypass    UPPER GASTROINTESTINAL ENDOSCOPY  08/10/2015    Dr Stanislav Velasquez ENDOSCOPY N/A 04/01/2016    Dr. Alvarenga Fass:  H Pylori(-), HH, o/w normal    UPPER GASTROINTESTINAL ENDOSCOPY N/A 05/07/2021    Dr Valentino Crowe hiatal hernia, previous gastric bypass surgery, gastritis    VENA CAVA FILTER PLACEMENT Right 2003      Social History     Socioeconomic History    Marital status:     Number of children: 1   Occupational History     Employer: [employer]      Comment:    Tobacco Use    Smoking status: Never    Smokeless tobacco: Never   Vaping Use    Vaping Use: Never used   Substance and Sexual Activity    Alcohol use: Never    Drug use: Never    Sexual activity: Not Currently     Comment: has a daughter   Social History Narrative    CODE STATUS: Full Code    HEALTH CARE PROXY: her daughter, [phone]    Backup: Susana Lundberg, grand daughter, [phone]    AMBULATES: uses a Rollator    DOMICILED: has steps to enter, no stairs inside, lives with her daughter and two grand daughters, her niece, and her         Lives with daughter, son-in-law, granddaughter, niece. Drives very little, daughter usually transports pt. Works part time at [employer]. Has pet dog. Social Determinants of Health     Financial Resource Strain: Low Risk     Difficulty of Paying Living Expenses: Not hard at all   Food Insecurity: No Food Insecurity    Worried About [address] in the Last Year: Never true    [address] [employer] in the Last Year: Never true   Transportation Needs: No Transportation Needs    Lack of Transportation (Medical): No    Lack of Transportation (Non-Medical):  No   Physical Activity: Inactive    Days of Exercise per Week: 0 days    Minutes of Exercise per Session: 0 min   Housing Stability: Unknown    Unstable Housing in the Last Year: No      Family History   Problem Relation Age of Onset    Uterine Cancer Mother     Cervical Cancer Mother     Coronary Art Dis Mother     Heart Disease Father     Lung Cancer Father     Other Father         renal failure    Cervical Cancer Sister     Other Sister         fibromyalgia    Colon Cancer Brother     Colon Polyps Brother     Other Brother         owens    Uterine Cancer Maternal Grandmother     Cervical Cancer Maternal Grandmother     Diabetes Maternal Grandmother     Diabetes Paternal Aunt     Breast Cancer Maternal Cousin     No Known Problems Daughter     Esophageal Cancer Neg Hx     Liver Cancer Neg Hx     Liver Disease Neg Hx     Stomach Cancer Neg Hx     Rectal Cancer Neg Hx         Current Outpatient Medications   Medication Sig Dispense Refill    amoxicillin-clavulanate (AUGMENTIN) 875-125 MG per tablet Take 1 tablet by mouth 2 times daily for 10 days 20 tablet 0    ferrous gluconate (FERGON) 240 (27 Fe) MG tablet Take 1 tablet by mouth twice daily 180 tablet 0    ARIPiprazole (ABILIFY) 5 MG tablet Take 1 tablet by mouth daily 30 tablet 3    levothyroxine (SYNTHROID) 75 MCG tablet Take 1 tablet by mouth Daily 30 tablet 3    sucralfate (CARAFATE) 1 GM tablet TAKE 1 TABLET FOUR TIMES DAILY 360 tablet 0    calcitRIOL (ROCALTROL) 0.25 MCG capsule TAKE 1 CAPSULE EVERY DAY 90 capsule 0    gabapentin (NEURONTIN) 300 MG capsule TAKE 1 CAPSULE BY MOUTH THREE TIMES DAILY 270 capsule 0    Semaglutide,0.25 or 0.5MG/DOS, 2 MG/1.5ML SOPN Inject 0.25 mg into the skin once a week 3 pen 2    fexofenadine (ALLEGRA) 180 MG tablet Take 1 tablet by mouth once daily 90 tablet 2    pantoprazole (PROTONIX) 40 MG tablet TAKE 1 TABLET TWICE DAILY WITH MEALS 180 tablet 1    bumetanide (BUMEX) 2 MG tablet Take 0.5 tablets by mouth daily as needed (In the body weight is 3 pound over 234 pound( considered as dry weight)) 90 tablet 0    rosuvastatin (CRESTOR) 5 MG tablet TAKE 1 TABLET EVERY DAY 90 tablet 3    Alcohol Swabs (B-D SINGLE USE SWABS REGULAR) PADS TID E11.21 300 each 3    Blood Glucose Calibration (TRUE METRIX LEVEL 1) Low SOLN TID E11.21 3 each 3    ondansetron (ZOFRAN) 4 MG tablet Take 1 tablet by mouth every 8 hours as needed for Nausea 30 tablet 1    baclofen (LIORESAL) 10 MG tablet Take 1 tablet by mouth 3 times daily As needed for hip pain (Patient taking differently: Take 10 mg by mouth 3 times daily as needed As needed for hip pain) 30 tablet 1    calcipotriene (DOVONEX) 0.005 % cream Use topically as needed (Patient taking differently: Apply 1 Dose topically 2 times daily as needed Use topically as needed) 3 each 1    escitalopram (LEXAPRO) 20 MG tablet Take 1 tablet by mouth daily 90 tablet 3    tiotropium (SPIRIVA RESPIMAT) 2.5 MCG/ACT AERS inhaler Inhale 2 puffs into the lungs daily 3 each 1    warfarin (COUMADIN) 7.5 MG tablet 7.5mg on Monday/Wed/Fri and 15mg all other days (Patient taking differently: 7.5 mg daily) 180 tablet 3    clobetasol (TEMOVATE) 0.05 % cream Apply topically 2 times daily. 3 each 2    CPAP Machine MISC Inhale 1 each into the lungs nightly      Multiple Vitamins-Minerals (CENTRUM ADULTS) TABS Take 1 tablet by mouth daily      Dulaglutide (TRULICITY) 0.57 SB/5.9OJ SOPN Inject 0.75 mg into the skin once a week 4 Adjustable Dose Pre-filled Pen Syringe 2    docusate sodium (COLACE) 100 MG capsule TAKE 1 CAPSULE BY MOUTH TWICE DAILY AS NEEDED FOR CONSTIPATION      oxyCODONE-acetaminophen (PERCOCET) 5-325 MG per tablet TAKE 1 TABLET BY MOUTH EVERY 4 HOURS AS NEEDED FOR PAIN      montelukast (SINGULAIR) 10 MG tablet Take 1 tablet by mouth daily 90 tablet 3    Hydrocortisone, Perianal, (PROCTO-CASPER) 1 % cream Apply topically 2 times daily.  1 each 1     Current Facility-Administered Medications   Medication Dose Route Frequency Provider Last Rate Last Admin    cyanocobalamin injection 1,000 mcg  1,000 mcg IntraMUSCular Once Ivana Reed MD            Patient Active Problem List   Diagnosis    Gastroesophageal reflux disease    History of gastric bypass    Family history of colon cancer    Fibromyalgia    Encounter for current long-term use of anticoagulants    Primary osteoarthritis of right knee    Arthritis of knee    Hypertensive disorder    Hyperglycemia    Complex sleep apnea syndrome    Iron deficiency anemia    Restrictive airway disease    DVT, lower extremity, proximal, acute, unspecified laterality ([address]    History of ulcer disease    Anticoagulated on Coumadin    Postmenopausal osteoporosis    Diabetes mellitus ([address]    Pacemaker    History of DVT (deep vein thrombosis)    Lupus anticoagulant disorder (HCC)    History of pulmonary embolism    Thrombocytopenia (HCC)    Hypothyroidism    Morbid obesity due to excess calories (HCC)    Asthmatic bronchitis without complication    Gastroenteritis    Colic    Abdominal pain    Non-intractable vomiting with nausea    Severe episode of recurrent major depressive disorder, without psychotic features ([address]    Lower abdominal pain    Chronic heartburn    S/P gastric bypass    Dog bite    Cellulitis of right upper extremity    Abscess of right arm    Soft tissue infection    Skin ulcer of upper arm, with fat layer exposed ([address]    History of Gage-en-Y gastric bypass    Hyperlipidemia    Hypersomnia    Nonrheumatic mitral valve regurgitation    Obesity, Class II, BMI 35-39.9    Peripheral edema    Restless legs syndrome (RLS)    Vitamin D deficiency    Chronic deep vein thrombosis (DVT) of proximal vein of lower extremity (HCC)    Unspecified severe protein-calorie malnutrition (HCC)    H/O systemic lupus erythematosus (SLE) (HCC)    Type II diabetes mellitus with nephropathy (HCC)    Stable angina (HCC)    Stage 3a chronic kidney disease (HCC)    Chronic renal disease, stage III ([address] [[id number]]    Acute kidney injury superimposed on chronic kidney disease (HCC)    Chronic obstructive lung disease (HCC)    Paroxysmal atrial fibrillation (HCC)    Paroxysmal SVT (supraventricular tachycardia) (HCC)    SSS (sick sinus syndrome) ([address]        Review of Systems   Constitutional:  Positive for fatigue. Negative for activity change, appetite change, chills, diaphoresis, fever and unexpected weight change. HENT:  Negative for congestion, ear pain, hearing loss, nosebleeds, postnasal drip, rhinorrhea, sinus pressure, sinus pain, sneezing, sore throat, tinnitus, trouble swallowing and voice change. Eyes:  Negative for discharge and itching.    Respiratory:  Negative for apnea, cough, chest tightness, shortness of breath, wheezing and stridor. Cardiovascular:  Positive for leg swelling. Negative for chest pain and palpitations. Left leg swelling; chronic secondary to DVT. Gastrointestinal:  Negative for abdominal distention, abdominal pain, blood in stool, constipation, diarrhea, nausea and vomiting. Endocrine: Negative for cold intolerance, heat intolerance, polydipsia, polyphagia and polyuria. Genitourinary:  Negative for difficulty urinating, dysuria, flank pain, frequency, hematuria and urgency. Musculoskeletal:  Positive for arthralgias and back pain. Negative for gait problem, joint swelling, myalgias, neck pain and neck stiffness. She had bilateral knee pain. Recently had a right total knee replacement. Skin:  Positive for wound. Negative for color change, pallor and rash. Several dog bites to her right arm   Allergic/Immunologic: Positive for environmental allergies. Negative for food allergies and immunocompromised state. Neurological:  Negative for dizziness, tremors, seizures, syncope, facial asymmetry, speech difficulty, weakness, light-headedness, numbness and headaches. Numbness and tingling to her lower extremities   Hematological:  Negative for adenopathy. Does not bruise/bleed easily. Psychiatric/Behavioral:  Positive for dysphoric mood. Negative for agitation, confusion, decreased concentration and hallucinations. The patient is not nervous/anxious and is not hyperactive. Depression is a little worse lately     /72   Pulse 78   Temp 97.5 °F (36.4 °C)   Wt 232 lb (105.2 kg)   SpO2 99%   BMI 36.34 kg/m²   Physical Exam  Vitals and nursing note reviewed. Constitutional:       General: She is not in acute distress. Appearance: Normal appearance. She is well-developed. She is obese. She is not ill-appearing, toxic-appearing or diaphoretic. HENT:      Head: Normocephalic and atraumatic.       Right Ear: Tympanic membrane, ear canal and external ear normal. There is no impacted cerumen. Left Ear: Tympanic membrane, ear canal and external ear normal.      Nose: Nose normal. No congestion or rhinorrhea. Mouth/Throat:      Mouth: Mucous membranes are moist.      Pharynx: Oropharynx is clear. No oropharyngeal exudate or posterior oropharyngeal erythema. Eyes:      General: No scleral icterus. Right eye: No discharge. Left eye: No discharge. Extraocular Movements: Extraocular movements intact. Conjunctiva/sclera: Conjunctivae normal.      Pupils: Pupils are equal, round, and reactive to light. Neck:      Thyroid: No thyromegaly. Vascular: No carotid bruit or JVD. Trachea: No tracheal deviation. Cardiovascular:      Rate and Rhythm: Normal rate and regular rhythm. Pulses: Normal pulses. Heart sounds: Normal heart sounds. No murmur heard. No friction rub. No gallop. Pulmonary:      Effort: Pulmonary effort is normal. No respiratory distress. Breath sounds: Normal breath sounds. No stridor. No wheezing, rhonchi or rales. Chest:      Chest wall: No tenderness. Abdominal:      General: Abdomen is flat. Bowel sounds are normal. There is no distension. Palpations: Abdomen is soft. There is no mass. Tenderness: There is no abdominal tenderness. There is no right CVA tenderness, left CVA tenderness, guarding or rebound. Hernia: No hernia is present. Musculoskeletal:         General: Tenderness present. No swelling, deformity or signs of injury. Normal range of motion. Cervical back: Normal range of motion. No rigidity. No muscular tenderness. Lumbar back: Spasms present. No swelling or bony tenderness. Normal range of motion. Back:       Right lower leg: No edema. Left lower leg: No edema. Comments:  Does have some hip pain on palpation of the left hip she also has calf pain and tenderness on palpation of the right calf. She does have some edema.   There is no erythema noted.    Visual inspection:  Deformity/amputation: absent  Skin lesions/pre-ulcerative calluses: present to bottom of the left foot, the base of the right great toe and her left great toe  Edema: right- negative, left- negative    Sensory exam:  Monofilament sensation: normal  (minimum of 5 random plantar locations tested, avoiding callused areas - > 1 area with absence of sensation is + for neuropathy)    Plus at least one of the following:  Pulses: normal,   Pinprick: Intact  Proprioception: N/A  Vibration (128 Hz): N/A   Lymphadenopathy:      Cervical: No cervical adenopathy. Skin:     General: Skin is warm and dry. Capillary Refill: Capillary refill takes less than 2 seconds. Coloration: Skin is not jaundiced or pale. Findings: No bruising, erythema, lesion or rash. Comments: She does have several dog bites to her right arm that are clean and do not appear to be infected at this time. Neurological:      General: No focal deficit present. Mental Status: She is alert and oriented to person, place, and time. Mental status is at baseline. Cranial Nerves: No cranial nerve deficit. Sensory: No sensory deficit. Motor: No weakness or abnormal muscle tone. Coordination: Coordination normal.      Gait: Gait normal.      Deep Tendon Reflexes: Reflexes normal.   Psychiatric:         Mood and Affect: Mood normal. Mood is not anxious or depressed. Behavior: Behavior normal.         Thought Content: Thought content normal.         Judgment: Judgment normal.       1. Dog bite, initial encounter    2. Paroxysmal atrial fibrillation (HCC)    3. Paroxysmal SVT (supraventricular tachycardia) (HCC)    4. SSS (sick sinus syndrome) ([address]    5. DVT, lower extremity, proximal, acute, unspecified laterality ([address]    6. Chronic deep vein thrombosis (DVT) of proximal vein of lower extremity, unspecified laterality ([address]    7. Stable angina (HCC)    8.  Chronic obstructive pulmonary disease, unspecified COPD type ([facility]    9. Type 2 diabetes mellitus with other specified complication, unspecified whether long term insulin use ([facility]    10. Type II diabetes mellitus with nephropathy ([facility]    11. Skin ulcer of upper arm, with fat layer exposed ([facility]    12. Stage 3 chronic kidney disease, unspecified whether stage 3a or 3b CKD ([facility]    13. Acute kidney injury superimposed on chronic kidney disease ([facility]    14. Stage 3a chronic kidney disease ([facility]    15. Lupus anticoagulant disorder ([facility]    16. Thrombocytopenia ([facility]    17. Morbid obesity due to excess calories ([facility]    18. Severe episode of recurrent major depressive disorder, without psychotic features ([facility]    19. Iron deficiency anemia, unspecified iron deficiency anemia type    20. Other depression    21. Hypothyroidism, unspecified type    22. Gastroesophageal reflux disease with esophagitis without hemorrhage    23. Gastritis without bleeding, unspecified chronicity, unspecified gastritis type    24. Vitamin D deficiency    25. Peripheral polyneuropathy    26. Environmental allergies    27. Hyperlipidemia, unspecified hyperlipidemia type    28. Chronic low back pain, unspecified back pain laterality, unspecified whether sciatica present    29. Hypercoagulable state ([facility]    30. Coats' disease, unspecified laterality    31. Sleep apnea, unspecified type    32. Primary hypertension    33. B12 deficiency        ASSESSMENT/PLAN:    59-year-old woman here for follow-up    1. Dog bite: Augmentin prescribed    2. Anemia: Her CBC is currently pending. Continue ferrous gluconate as prescribed    3. Depression: Continue Lexapro. Add Abilify to her medication regimen    4. Hypothyroidism: Continue Synthroid at current dose for now    5. Acid reflux/gastritis: Continue Carafate and Protonix as prescribed    6. Vitamin D deficiency: Continue calcitriol    7. Chronic kidney disease: Labs are currently pending    8.   Peripheral neuropathy: Continue gabapentin as prescribed    9. Type 2 diabetes: Continue Ozempic. Her A1c is currently pending    10. Environmental allergies: Doing well with Allegra and Singulair    11. Hyperlipidemia: Continue Crestor as prescribed    12. Chronic back pain: Baclofen as needed    13. COPD: Stable on her current dose of Spiriva    14. History of DVT/hypercoagulable state/positive lupus anticoagulant/coat syndrome: Continue Coumadin as prescribed    15. Sleep  Apnea: Continue CPAP as prescribed    16. Hypertension: Blood pressure well controlled on Bumex and Lisinopril    17. B12 deficiency: B12 injection given today    18. History of atrial fibrillation/supraventricular tachycardia: Heart rate is rate controlled at this time. She also has a history of sick sinus syndrome. She is followed by cardiology. 19.  Stable angina: Patient denies any complaints of chest pain at this time    20. History of skin ulcer of upper arm: Resolved    21. History of acute kidney injury: Awaiting lab work    25. Thrombocytopenia: CBC is currently pending    23: Obesity: Stable    Alexia Moore was seen today for follow-up. Diagnoses and all orders for this visit:    Dog bite, initial encounter    Paroxysmal atrial fibrillation (HCC)    Paroxysmal SVT (supraventricular tachycardia) (HCC)    SSS (sick sinus syndrome) (HCC)    DVT, lower extremity, proximal, acute, unspecified laterality (HCC)    Chronic deep vein thrombosis (DVT) of proximal vein of lower extremity, unspecified laterality (HCC)    Stable angina (HCC)    Chronic obstructive pulmonary disease, unspecified COPD type [facility]    Type 2 diabetes mellitus with other specified complication, unspecified whether long term insulin use ([facility]  -     Vitamin B12; Future  -     Vitamin D 25 Hydroxy; Future  -     CBC with Auto Differential; Future  -     Comprehensive Metabolic Panel; Future  -     Hemoglobin A1C; Future  -     Lipid Panel;  Future  -     Microalbumin / Creatinine Urine Ratio; Future    Type II diabetes mellitus with nephropathy (HCC)    Skin ulcer of upper arm, with fat layer exposed ([facility]    Stage 3 chronic kidney disease, unspecified whether stage 3a or 3b CKD ([facility]    Acute kidney injury superimposed on chronic kidney disease ([facility]    Stage 3a chronic kidney disease (HCC)    Lupus anticoagulant disorder (HCC)    Thrombocytopenia ([facility]    Morbid obesity due to excess calories (HCC)    Severe episode of recurrent major depressive disorder, without psychotic features ([facility]    Iron deficiency anemia, unspecified iron deficiency anemia type  -     ferrous gluconate (FERGON) 240 (27 Fe) MG tablet; Take 1 tablet by mouth twice daily  -     NM VITAMIN B12 INJECTION  -     cyanocobalamin injection 1,000 mcg    Other depression    Hypothyroidism, unspecified type    Gastroesophageal reflux disease with esophagitis without hemorrhage    Gastritis without bleeding, unspecified chronicity, unspecified gastritis type    Vitamin D deficiency    Peripheral polyneuropathy    Environmental allergies    Hyperlipidemia, unspecified hyperlipidemia type    Chronic low back pain, unspecified back pain laterality, unspecified whether sciatica present    Hypercoagulable state ([facility]    Coats' disease, unspecified laterality    Sleep apnea, unspecified type    Primary hypertension    B12 deficiency    Other orders  -     amoxicillin-clavulanate (AUGMENTIN) 875-125 MG per tablet; Take 1 tablet by mouth 2 times daily for 10 days  -     ARIPiprazole (ABILIFY) 5 MG tablet; Take 1 tablet by mouth daily        Return in about 3 months (around 5/3/2023), or medicare wellness.      Orders Placed This Encounter   Procedures    Vitamin B12     Standing Status:   Future     Standing Expiration Date:   2/3/2024    Vitamin D 25 Hydroxy     Standing Status:   Future     Standing Expiration Date:   2/3/2024    CBC with Auto Differential     Standing Status:   Future     Standing Expiration Date:   2/3/2024 Comprehensive Metabolic Panel     Standing Status:   Future     Standing Expiration Date:   2/3/2024    Hemoglobin A1C     Standing Status:   Future     Standing Expiration Date:   2/3/2024    Lipid Panel     Standing Status:   Future     Standing Expiration Date:   2/3/2024    Microalbumin / Creatinine Urine Ratio     Standing Status:   Future     Standing Expiration Date:   2/3/2024    Eric Saldivar MD

## 2023-02-05 ASSESSMENT — ENCOUNTER SYMPTOMS
ABDOMINAL PAIN: 0
WHEEZING: 0
EYE DISCHARGE: 0
ABDOMINAL DISTENTION: 0
DIARRHEA: 0
COUGH: 0
BACK PAIN: 1
CONSTIPATION: 0
APNEA: 0
SORE THROAT: 0
COLOR CHANGE: 0
TROUBLE SWALLOWING: 0
SHORTNESS OF BREATH: 0
SINUS PRESSURE: 0
CHEST TIGHTNESS: 0
BLOOD IN STOOL: 0
VOMITING: 0
RHINORRHEA: 0
NAUSEA: 0
STRIDOR: 0
SINUS PAIN: 0
EYE ITCHING: 0
VOICE CHANGE: 0

## 2023-02-08 ENCOUNTER — ANTI-COAG VISIT (OUTPATIENT)
Dept: PRIMARY CARE CLINIC | Age: 73
End: 2023-02-08
Payer: COMMERCIAL

## 2023-02-08 DIAGNOSIS — Z79.01 ANTICOAGULATED ON COUMADIN: Primary | ICD-10-CM

## 2023-02-08 LAB — INR BLD: 2

## 2023-02-08 PROCEDURE — 93793 ANTICOAG MGMT PT WARFARIN: CPT | Performed by: INTERNAL MEDICINE

## 2023-02-08 NOTE — PROGRESS NOTES
HOME MONITORING REPORT    INR today:   Results for orders placed or performed in visit on 02/08/23   Protime-INR   Result Value Ref Range    INR 2.00        INR Goal: 2.0-3.0    Dosing Plan  As of 2/8/2023      TTR:  31.6 % (4.7 y)   Full warfarin instructions:  7.5 mg every day; Starting 2/8/2023                PLAN: Advised patient/caregiver to continue current dose and recheck in one week.   Patient/Caregiver voiced understanding

## 2023-02-16 ENCOUNTER — ANTI-COAG VISIT (OUTPATIENT)
Dept: PRIMARY CARE CLINIC | Age: 73
End: 2023-02-16
Payer: COMMERCIAL

## 2023-02-16 DIAGNOSIS — Z79.01 ANTICOAGULATED ON COUMADIN: Primary | ICD-10-CM

## 2023-02-16 LAB — INR BLD: 1.9

## 2023-02-16 PROCEDURE — 93793 ANTICOAG MGMT PT WARFARIN: CPT | Performed by: INTERNAL MEDICINE

## 2023-02-16 NOTE — PROGRESS NOTES
HOME MONITORING REPORT    INR today:   Results for orders placed or performed in visit on 02/16/23   Protime-INR   Result Value Ref Range    INR 1.90        INR Goal: 2.0-3.0    Dosing Plan  As of 2/16/2023      TTR:  31.5 % (4.8 y)   Full warfarin instructions:  2/16: 11.25 mg; Otherwise 7.5 mg every day; Starting 2/16/2023                PLAN: Advised patient/caregiver to increase her dose to 11.25 mg tonight, then continue current dose and recheck in one week.   Patient/Caregiver voiced understanding

## 2023-02-17 ENCOUNTER — PATIENT MESSAGE (OUTPATIENT)
Dept: INTERNAL MEDICINE | Age: 73
End: 2023-02-17

## 2023-02-17 RX ORDER — TIRZEPATIDE 2.5 MG/.5ML
2.5 INJECTION, SOLUTION SUBCUTANEOUS WEEKLY
Qty: 1 ADJUSTABLE DOSE PRE-FILLED PEN SYRINGE | Refills: 2 | Status: SHIPPED | OUTPATIENT
Start: 2023-02-17

## 2023-02-22 ENCOUNTER — ANTI-COAG VISIT (OUTPATIENT)
Dept: PRIMARY CARE CLINIC | Age: 73
End: 2023-02-22
Payer: COMMERCIAL

## 2023-02-22 DIAGNOSIS — Z79.01 ANTICOAGULATED ON COUMADIN: Primary | ICD-10-CM

## 2023-02-22 LAB — INR BLD: 1.9

## 2023-02-22 PROCEDURE — 93793 ANTICOAG MGMT PT WARFARIN: CPT | Performed by: INTERNAL MEDICINE

## 2023-02-23 DIAGNOSIS — G62.9 NEUROPATHY: ICD-10-CM

## 2023-02-23 RX ORDER — GABAPENTIN 300 MG/1
CAPSULE ORAL
Qty: 270 CAPSULE | Refills: 0 | Status: SHIPPED | OUTPATIENT
Start: 2023-02-23 | End: 2023-05-23

## 2023-02-27 ENCOUNTER — CLINICAL SUPPORT NO REQUIREMENTS (OUTPATIENT)
Dept: CARDIOLOGY | Facility: CLINIC | Age: 73
End: 2023-02-27
Payer: MEDICARE

## 2023-02-27 DIAGNOSIS — I49.5 SSS (SICK SINUS SYNDROME): ICD-10-CM

## 2023-02-27 DIAGNOSIS — Z95.0 PACEMAKER: Primary | ICD-10-CM

## 2023-03-06 ENCOUNTER — OFFICE VISIT (OUTPATIENT)
Dept: HEMATOLOGY | Age: 73
End: 2023-03-06
Payer: MEDICARE

## 2023-03-06 ENCOUNTER — HOSPITAL ENCOUNTER (OUTPATIENT)
Dept: INFUSION THERAPY | Age: 73
Discharge: HOME | End: 2023-03-06
Payer: MEDICARE

## 2023-03-06 ENCOUNTER — ANTI-COAG VISIT (OUTPATIENT)
Dept: PRIMARY CARE CLINIC | Age: 73
End: 2023-03-06
Payer: COMMERCIAL

## 2023-03-06 VITALS
SYSTOLIC BLOOD PRESSURE: 142 MMHG | DIASTOLIC BLOOD PRESSURE: 90 MMHG | BODY MASS INDEX: 35.31 KG/M2 | WEIGHT: 225 LBS | OXYGEN SATURATION: 95 % | HEIGHT: 67 IN | HEART RATE: 79 BPM

## 2023-03-06 DIAGNOSIS — D50.9 IRON DEFICIENCY ANEMIA, UNSPECIFIED IRON DEFICIENCY ANEMIA TYPE: Primary | ICD-10-CM

## 2023-03-06 DIAGNOSIS — D50.9 IRON DEFICIENCY ANEMIA, UNSPECIFIED IRON DEFICIENCY ANEMIA TYPE: ICD-10-CM

## 2023-03-06 DIAGNOSIS — Z79.01 ANTICOAGULATED ON COUMADIN: Primary | ICD-10-CM

## 2023-03-06 DIAGNOSIS — D69.6 THROMBOCYTOPENIA (HCC): ICD-10-CM

## 2023-03-06 DIAGNOSIS — E03.9 HYPOTHYROIDISM, UNSPECIFIED TYPE: ICD-10-CM

## 2023-03-06 DIAGNOSIS — Z79.01 ENCOUNTER FOR CURRENT LONG-TERM USE OF ANTICOAGULANTS: ICD-10-CM

## 2023-03-06 DIAGNOSIS — Z79.01 ANTICOAGULATED ON COUMADIN: ICD-10-CM

## 2023-03-06 LAB
BASOPHILS ABSOLUTE: 0.04 K/UL (ref 0.01–0.08)
BASOPHILS RELATIVE PERCENT: 0.7 % (ref 0.1–1.2)
EOSINOPHILS ABSOLUTE: 0.04 K/UL (ref 0.04–0.54)
EOSINOPHILS RELATIVE PERCENT: 0.7 % (ref 0.7–7)
HCT VFR BLD CALC: 36.4 % (ref 34.1–44.9)
HEMOGLOBIN: 11 G/DL (ref 11.2–15.7)
INR BLD: 1.2
INTERNATIONAL NORMALIZATION RATIO, POC: 1.2
LYMPHOCYTES ABSOLUTE: 1.91 K/UL (ref 1.18–3.74)
LYMPHOCYTES RELATIVE PERCENT: 31.6 % (ref 19.3–53.1)
MCH RBC QN AUTO: 28.9 PG (ref 25.6–32.2)
MCHC RBC AUTO-ENTMCNC: 30.2 G/DL (ref 32.3–35.5)
MCV RBC AUTO: 95.5 FL (ref 79.4–94.8)
MONOCYTES ABSOLUTE: 0.85 K/UL (ref 0.24–0.82)
MONOCYTES RELATIVE PERCENT: 14 % (ref 4.7–12.5)
NEUTROPHILS ABSOLUTE: 3.19 K/UL (ref 1.56–6.13)
NEUTROPHILS RELATIVE PERCENT: 52.7 % (ref 34–71.1)
PDW BLD-RTO: 17.2 % (ref 11.7–14.4)
PLATELET # BLD: 177 K/UL (ref 182–369)
PMV BLD AUTO: 11.5 FL (ref 7.4–10.4)
PROTHROMBIN TIME, POC: NORMAL
RBC # BLD: 3.81 M/UL (ref 3.93–5.22)
REASON FOR REJECTION: NORMAL
REJECTED TEST: NORMAL
WBC # BLD: 6.05 K/UL (ref 3.98–10.04)

## 2023-03-06 PROCEDURE — G8399 PT W/DXA RESULTS DOCUMENT: HCPCS | Performed by: NURSE PRACTITIONER

## 2023-03-06 PROCEDURE — 1036F TOBACCO NON-USER: CPT | Performed by: NURSE PRACTITIONER

## 2023-03-06 PROCEDURE — 36415 COLL VENOUS BLD VENIPUNCTURE: CPT

## 2023-03-06 PROCEDURE — G8484 FLU IMMUNIZE NO ADMIN: HCPCS | Performed by: NURSE PRACTITIONER

## 2023-03-06 PROCEDURE — 85025 COMPLETE CBC W/AUTO DIFF WBC: CPT

## 2023-03-06 PROCEDURE — G8417 CALC BMI ABV UP PARAM F/U: HCPCS | Performed by: NURSE PRACTITIONER

## 2023-03-06 PROCEDURE — 3074F SYST BP LT 130 MM HG: CPT | Performed by: NURSE PRACTITIONER

## 2023-03-06 PROCEDURE — 1090F PRES/ABSN URINE INCON ASSESS: CPT | Performed by: NURSE PRACTITIONER

## 2023-03-06 PROCEDURE — 85610 PROTHROMBIN TIME: CPT | Performed by: NURSE PRACTITIONER

## 2023-03-06 PROCEDURE — 3078F DIAST BP <80 MM HG: CPT | Performed by: NURSE PRACTITIONER

## 2023-03-06 PROCEDURE — G8427 DOCREV CUR MEDS BY ELIG CLIN: HCPCS | Performed by: NURSE PRACTITIONER

## 2023-03-06 PROCEDURE — 3017F COLORECTAL CA SCREEN DOC REV: CPT | Performed by: NURSE PRACTITIONER

## 2023-03-06 PROCEDURE — 93793 ANTICOAG MGMT PT WARFARIN: CPT | Performed by: INTERNAL MEDICINE

## 2023-03-06 PROCEDURE — 99212 OFFICE O/P EST SF 10 MIN: CPT

## 2023-03-06 PROCEDURE — 36415 COLL VENOUS BLD VENIPUNCTURE: CPT | Performed by: NURSE PRACTITIONER

## 2023-03-06 PROCEDURE — 99213 OFFICE O/P EST LOW 20 MIN: CPT | Performed by: NURSE PRACTITIONER

## 2023-03-06 PROCEDURE — 1123F ACP DISCUSS/DSCN MKR DOCD: CPT | Performed by: NURSE PRACTITIONER
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
## 2023-03-06 NOTE — PROGRESS NOTES
HOME MONITORING REPORT    INR today:   Results for orders placed or performed in visit on 03/06/23   Protime-INR   Result Value Ref Range    INR 1.20        INR Goal: 2.0-3.0    Dosing Plan  As of 3/6/2023      TTR:  31.1 % (4.8 y)   Full warfarin instructions:  3/6: 15 mg; Otherwise 11.25 mg every Sun; 7.5 mg all other days; Starting 3/6/2023            Patient had a fingerstick done in hematology, they sent her down here. She has not taken her warfarin for the last 4 nights due to misplacing it. She will be starting it again tonight. PLAN: Advised patient/caregiver to increase her dose tonight only to 15 mg, then continue current dose and recheck in on 3/15/23.   Patient voiced understanding

## 2023-03-09 ENCOUNTER — ANTI-COAG VISIT (OUTPATIENT)
Dept: PRIMARY CARE CLINIC | Age: 73
End: 2023-03-09
Payer: COMMERCIAL

## 2023-03-09 DIAGNOSIS — Z79.01 ANTICOAGULATED ON COUMADIN: Primary | ICD-10-CM

## 2023-03-09 LAB — INR BLD: 1.5

## 2023-03-09 PROCEDURE — 93793 ANTICOAG MGMT PT WARFARIN: CPT | Performed by: INTERNAL MEDICINE

## 2023-03-09 ASSESSMENT — ENCOUNTER SYMPTOMS
EYE REDNESS: 0
EYE PAIN: 0
DIARRHEA: 0
RESPIRATORY NEGATIVE: 1
WHEEZING: 0
BACK PAIN: 0
SHORTNESS OF BREATH: 0
EYE DISCHARGE: 0
BLOOD IN STOOL: 0
VOMITING: 0
GASTROINTESTINAL NEGATIVE: 1
EYES NEGATIVE: 1
SORE THROAT: 0
NAUSEA: 0
CONSTIPATION: 0
COUGH: 0
ABDOMINAL PAIN: 0

## 2023-03-09 NOTE — PROGRESS NOTES
HOME MONITORING REPORT    INR today:   Results for orders placed or performed in visit on 03/09/23   Protime-INR   Result Value Ref Range    INR 1.50        INR Goal: 2.0-3.0    Dosing Plan  As of 3/9/2023      TTR:  31.1 % (4.8 y)   Full warfarin instructions:  3/9: 15 mg; Otherwise 11.25 mg every Sun; 7.5 mg all other days; Starting 3/9/2023                PLAN: Advised patient/caregiver to increase her dose tonight to 15 mg, then continue current dose and recheck early next week.   Patient/Caregiver voiced understanding

## 2023-03-10 ENCOUNTER — HOSPITAL ENCOUNTER (OUTPATIENT)
Dept: INFUSION THERAPY | Age: 73
Discharge: HOME | End: 2023-03-10
Payer: MEDICARE

## 2023-03-10 ENCOUNTER — NURSE ONLY (OUTPATIENT)
Dept: INTERNAL MEDICINE | Age: 73
End: 2023-03-10

## 2023-03-10 DIAGNOSIS — E53.8 B12 DEFICIENCY: Primary | ICD-10-CM

## 2023-03-10 DIAGNOSIS — D50.9 IRON DEFICIENCY ANEMIA, UNSPECIFIED IRON DEFICIENCY ANEMIA TYPE: ICD-10-CM

## 2023-03-10 DIAGNOSIS — D69.6 THROMBOCYTOPENIA (HCC): ICD-10-CM

## 2023-03-10 LAB
ALBUMIN SERPL-MCNC: 4.2 G/DL (ref 3.5–5.2)
ALP BLD-CCNC: 74 U/L (ref 35–104)
ALT SERPL-CCNC: 15 U/L (ref 5–33)
ANION GAP SERPL CALCULATED.3IONS-SCNC: 10 MMOL/L (ref 7–19)
AST SERPL-CCNC: 29 U/L (ref 5–32)
BASOPHILS ABSOLUTE: 0.02 K/UL (ref 0.01–0.08)
BASOPHILS RELATIVE PERCENT: 0.5 % (ref 0.1–1.2)
BILIRUB SERPL-MCNC: <0.2 MG/DL (ref 0.2–1.2)
BUN BLDV-MCNC: 20 MG/DL (ref 8–23)
CALCIUM SERPL-MCNC: 9.3 MG/DL (ref 8.8–10.2)
CHLORIDE BLD-SCNC: 107 MMOL/L (ref 98–111)
CO2: 23 MMOL/L (ref 22–29)
CREAT SERPL-MCNC: 1.2 MG/DL (ref 0.5–0.9)
EOSINOPHILS ABSOLUTE: 0.05 K/UL (ref 0.04–0.54)
EOSINOPHILS RELATIVE PERCENT: 1.2 % (ref 0.7–7)
FERRITIN: 138.7 NG/ML (ref 13–150)
GFR SERPL CREATININE-BSD FRML MDRD: 48 ML/MIN/{1.73_M2}
GLUCOSE BLD-MCNC: 71 MG/DL (ref 74–109)
HCT VFR BLD CALC: 36.8 % (ref 34.1–44.9)
HEMOGLOBIN: 11.4 G/DL (ref 11.2–15.7)
IRON SATURATION: 15 % (ref 14–50)
IRON: 50 UG/DL (ref 37–145)
LYMPHOCYTES ABSOLUTE: 1.43 K/UL (ref 1.18–3.74)
LYMPHOCYTES RELATIVE PERCENT: 33.3 % (ref 19.3–53.1)
MCH RBC QN AUTO: 29.5 PG (ref 25.6–32.2)
MCHC RBC AUTO-ENTMCNC: 31 G/DL (ref 32.3–35.5)
MCV RBC AUTO: 95.3 FL (ref 79.4–94.8)
MONOCYTES ABSOLUTE: 0.38 K/UL (ref 0.24–0.82)
MONOCYTES RELATIVE PERCENT: 8.8 % (ref 4.7–12.5)
NEUTROPHILS ABSOLUTE: 2.41 K/UL (ref 1.56–6.13)
NEUTROPHILS RELATIVE PERCENT: 56 % (ref 34–71.1)
PDW BLD-RTO: 17.3 % (ref 11.7–14.4)
PLATELET # BLD: 169 K/UL (ref 182–369)
PMV BLD AUTO: 11.9 FL (ref 7.4–10.4)
POTASSIUM SERPL-SCNC: 4.6 MMOL/L (ref 3.5–5)
RBC # BLD: 3.86 M/UL (ref 3.93–5.22)
SODIUM BLD-SCNC: 140 MMOL/L (ref 136–145)
TOTAL IRON BINDING CAPACITY: 327 UG/DL (ref 250–400)
TOTAL PROTEIN: 8.4 G/DL (ref 6.6–8.7)
WBC # BLD: 4.3 K/UL (ref 3.98–10.04)

## 2023-03-10 PROCEDURE — 85025 COMPLETE CBC W/AUTO DIFF WBC: CPT

## 2023-03-10 PROCEDURE — 99999 PR OFFICE/OUTPT VISIT,PROCEDURE ONLY: CPT | Performed by: INTERNAL MEDICINE

## 2023-03-10 PROCEDURE — 36415 COLL VENOUS BLD VENIPUNCTURE: CPT

## 2023-03-10 RX ORDER — CYANOCOBALAMIN 1000 UG/ML
1000 INJECTION, SOLUTION INTRAMUSCULAR; SUBCUTANEOUS ONCE
Status: SHIPPED | OUTPATIENT
Start: 2023-03-10

## 2023-03-10 RX ORDER — CYANOCOBALAMIN 1000 UG/ML
1000 INJECTION, SOLUTION INTRAMUSCULAR; SUBCUTANEOUS ONCE
Status: CANCELLED | OUTPATIENT
Start: 2023-03-10 | End: 2023-03-10

## 2023-03-10 NOTE — PROGRESS NOTES
After obtaining consent, and per orders of Dr. Curtis, injection of B12 given in Left upper quad. gluteus by Janine Hale MA. Patient instructed to remain in clinic for 20 minutes afterwards, and to report any adverse reaction to me immediately.

## 2023-03-17 ENCOUNTER — PATIENT MESSAGE (OUTPATIENT)
Dept: INTERNAL MEDICINE | Age: 73
End: 2023-03-17

## 2023-03-17 DIAGNOSIS — I10 ESSENTIAL HYPERTENSION: ICD-10-CM

## 2023-03-17 RX ORDER — POTASSIUM CHLORIDE 750 MG/1
10 TABLET, FILM COATED, EXTENDED RELEASE ORAL DAILY
Qty: 60 TABLET | Refills: 5 | Status: SHIPPED | OUTPATIENT
Start: 2023-03-17

## 2023-03-17 RX ORDER — BUMETANIDE 2 MG/1
1 TABLET ORAL DAILY PRN
Qty: 90 TABLET | Refills: 0 | Status: SHIPPED | OUTPATIENT
Start: 2023-03-17

## 2023-03-17 RX ORDER — CALCITRIOL 0.25 UG/1
0.25 CAPSULE, LIQUID FILLED ORAL DAILY
Qty: 90 CAPSULE | Refills: 3 | Status: SHIPPED | OUTPATIENT
Start: 2023-03-17

## 2023-03-17 NOTE — TELEPHONE ENCOUNTER
From: Sanaz Denney  To: Dr. Sunil Levine: 3/17/2023 1:12 AM CDT  Subject: Bumex and potassium     Need these refilled, retaining fluids very badly.

## 2023-03-17 NOTE — TELEPHONE ENCOUNTER
Lilly Opitz called to request a refill on her medication.       Last office visit : 2/3/2023   Next office visit : 5/5/2023     Requested Prescriptions     Pending Prescriptions Disp Refills    bumetanide (BUMEX) 2 MG tablet 90 tablet 0     Sig: Take 0.5 tablets by mouth daily as needed (In the body weight is 3 pound over 234 pound( considered as dry weight))    potassium chloride (KLOR-CON) 10 MEQ extended release tablet 60 tablet 5     Sig: Take 1 tablet by mouth daily            Juanita Head MA

## 2023-03-22 ENCOUNTER — OFFICE VISIT (OUTPATIENT)
Dept: NEUROLOGY | Facility: CLINIC | Age: 73
End: 2023-03-22
Payer: MEDICARE

## 2023-03-22 VITALS
WEIGHT: 235 LBS | BODY MASS INDEX: 36.88 KG/M2 | OXYGEN SATURATION: 98 % | HEART RATE: 68 BPM | HEIGHT: 67 IN | SYSTOLIC BLOOD PRESSURE: 142 MMHG | DIASTOLIC BLOOD PRESSURE: 92 MMHG

## 2023-03-22 DIAGNOSIS — G47.33 OBSTRUCTIVE SLEEP APNEA SYNDROME: Primary | ICD-10-CM

## 2023-03-22 PROCEDURE — 3077F SYST BP >= 140 MM HG: CPT | Performed by: NURSE PRACTITIONER

## 2023-03-22 PROCEDURE — 99213 OFFICE O/P EST LOW 20 MIN: CPT | Performed by: NURSE PRACTITIONER

## 2023-03-22 PROCEDURE — 1159F MED LIST DOCD IN RCRD: CPT | Performed by: NURSE PRACTITIONER

## 2023-03-22 PROCEDURE — 3080F DIAST BP >= 90 MM HG: CPT | Performed by: NURSE PRACTITIONER

## 2023-03-22 PROCEDURE — 1160F RVW MEDS BY RX/DR IN RCRD: CPT | Performed by: NURSE PRACTITIONER

## 2023-03-22 RX ORDER — TIRZEPATIDE 2.5 MG/.5ML
2.5 INJECTION, SOLUTION SUBCUTANEOUS
COMMUNITY
Start: 2023-02-17

## 2023-03-22 RX ORDER — LEVOTHYROXINE SODIUM 0.07 MG/1
1 TABLET ORAL DAILY
COMMUNITY
Start: 2023-01-25

## 2023-03-22 RX ORDER — ARIPIPRAZOLE 5 MG/1
1 TABLET ORAL DAILY
COMMUNITY
Start: 2023-02-03

## 2023-03-22 RX ORDER — GABAPENTIN 300 MG/1
CAPSULE ORAL
COMMUNITY
Start: 2023-02-23

## 2023-03-22 NOTE — PROGRESS NOTES
Neurology Progress Note    Chief Complaint:    Obstructive Sleep Apnea    Subjective   History of Present Illness:  Veronica Stewart is a 72 y.o. female who presents today for obstructive sleep apnea.  She is routinely followed by Zora Jackson MD for primary care.     Obstructive Sleep Apnea  She continues to not use her CPAP.  She states she is hopping from house to house and is currently with her mother and does not have the opportunity for three-pronged outlets to use her CPAP with.  She does continue with daytime somnolence, snoring, restless sleep, nonrestorative sleep, and generalized fatigue.  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  She has variable amounts of sleep during the day.    Allergies:    Bee venom, Insect extract allergy skin test, Percocet [oxycodone-acetaminophen], Prednisone, Tizanidine hcl, Ultram [tramadol hcl], Morphine, Hydrocodone-acetaminophen, and Other    Medications:  Current Outpatient Medications   Medication Sig Dispense Refill   • ARIPiprazole (ABILIFY) 5 MG tablet Take 1 tablet by mouth Daily.     • baclofen (LIORESAL) 10 MG tablet Take 1 tablet by mouth As Needed.     • Blood Glucose Calibration (True Metrix Level 1) Low solution      • bumetanide (BUMEX) 0.5 MG tablet Take 1 tablet by mouth Daily.     • calcipotriene (DOVONEX) 0.005 % cream Apply  topically to the appropriate area as directed As Needed.     • calcitriol (ROCALTROL) 0.25 MCG capsule Take 1 capsule by mouth Daily.     • clobetasol (TEMOVATE) 0.05 % cream Apply  topically 2 (Two) Times a Day.     • CloNIDine (CATAPRES) 0.1 MG tablet Take 1 tablet by mouth 2 (Two) Times a Day As Needed.     • cyanocobalamin 1000 MCG/ML injection Inject 1 mL into the appropriate muscle as directed by prescriber Every 28 (Twenty-Eight) Days.     • Diclofenac Sodium 2 % solution Place 1 applicator on the skin as directed by provider  2 (Two) Times a Day.     • escitalopram (LEXAPRO) 20 MG tablet Take 1 tablet by mouth Daily. 30 tablet 2   • ferrous gluconate (FERGON) 240 (27 FE) MG tablet Take 1 tablet by mouth 2 (Two) Times a Day.     • Ferrous Gluconate 239 (27 Fe) MG tablet Take 1 tablet by mouth 3 (Three) Times a Day.     • fexofenadine (ALLEGRA) 180 MG tablet Take 1 tablet by mouth Daily.     • gabapentin (NEURONTIN) 100 MG capsule Take 1 capsule by mouth 3 (Three) Times a Day.     • gabapentin (NEURONTIN) 300 MG capsule      • levothyroxine (SYNTHROID, LEVOTHROID) 75 MCG tablet Take 1 tablet by mouth Daily.     • levothyroxine (SYNTHROID, LEVOTHROID) 75 MCG tablet Take 1 tablet by mouth Daily.     • lisinopril (PRINIVIL,ZESTRIL) 40 MG tablet Take 1 tablet by mouth Daily. Currently on hold     • metoprolol tartrate (LOPRESSOR) 25 MG tablet Take 1 tablet by mouth 2 (Two) Times a Day. 180 tablet 3   • Multiple Vitamins-Minerals (MULTIVITAMIN WITH MINERALS) tablet tablet Take 1 tablet by mouth Daily.     • ondansetron (ZOFRAN) 4 MG tablet Take 1 tablet by mouth Daily As Needed for Nausea or Vomiting.     • oxyCODONE-acetaminophen (PERCOCET) 5-325 MG per tablet Take 1 tablet by mouth Every 4 (Four) Hours As Needed. for pain     • pantoprazole (PROTONIX) 40 MG EC tablet Take 1 tablet by mouth 2 (Two) Times a Day Before Meals.     • potassium chloride 10 MEQ CR tablet Take 1 tablet by mouth Daily. Currently on hold     • pregabalin (LYRICA) 75 MG capsule Take 1 capsule by mouth 3 (Three) Times a Day.     • rosuvastatin (CRESTOR) 5 MG tablet Take 1 tablet by mouth Daily.     • Semaglutide,0.25 or 0.5MG/DOS, (OZEMPIC) 2 MG/1.5ML solution pen-injector Inject 0.25 mg under the skin into the appropriate area as directed.     • sucralfate (CARAFATE) 1 g tablet Take 1 tablet by mouth 4 (Four) Times a Day.     • tiotropium (SPIRIVA HANDIHALER) 18 MCG per inhalation capsule Place 1 capsule into inhaler and inhale Daily.     • Tirzepatide (Mounjaro) 2.5  MG/0.5ML solution pen-injector Inject 2.5 mg under the skin into the appropriate area as directed.     • warfarin (COUMADIN) 7.5 MG tablet Take 1 tablet by mouth Daily. Alternating 7.5mg with 15mg     • montelukast (SINGULAIR) 10 MG tablet Take 1 tablet by mouth Daily.       No current facility-administered medications for this visit.     Current outpatient and discharge medications have been reconciled for the patient.  Reviewed by: CATRACHITO Mcdonnell    Past Medical History:  Past Medical History:   Diagnosis Date   • Anxiety    • Arrhythmia    • Blood clot in vein 05/2018    Hospitalized    • Redd-tachy syndrome (HCC)    • Bradycardia    • Breath shortness    • Burn     left leg   • Cardiac pacemaker     11/09 [facility]   • Chest pain    • Chronic fatigue    • Depression    • Diabetes mellitus (HCC)    • Difficulty walking 5/17   • Dizziness    • Fatigue    • Fibromyalgia, primary 7/15   • Follow-up exam    • Headache, tension-type 9/2018   • Heart burn    • Hypercoagulable state, primary ([facility])     LUPUS ANTI-COAG   • Hyperlipidemia    • Hypertension    • Liver disease    • Neuropathy in diabetes (HCC) 03/14   • Paroxysmal atrial fibrillation (HCC) 8/29/2022   • Peripheral neuropathy 03/03   • Shingles 9/2018   • Shortness of breath    • Sleep apnea     complex.  using a c-pap machine   • Stroke (HCC)    • Syncope    • Tachy-redd syndrome (HCC)    • TIA (transient ischemic attack) 04/2003   • Vision loss in . high school    lBlind rt. eye     Past Surgical History:   Procedure Laterality Date   • APPENDECTOMY     • CARDIAC ELECTROPHYSIOLOGY PROCEDURE N/A 02/26/2020    Procedure: PPM generator change - dual;  Surgeon: Ronny Lowe MD;  Location: [facility] CATH INVASIVE LOCATION;  Service: Cardiology;  Laterality: N/A;   • CATARACT EXTRACTION     • CHOLECYSTECTOMY     • HERNIA REPAIR     • HYSTERECTOMY     • INSERT / REPLACE / REMOVE PACEMAKER     • OOPHORECTOMY     • PACEMAKER IMPLANTATION     • REPLACEMENT  "TOTAL KNEE Bilateral 03/26/2018    Dr doherty - 7/19/22 left knee at [facility]   • TRAM-EN-Y PROCEDURE  2002    Dr Dahiana [facility]   • SPLENECTOMY       Family History   Problem Relation Age of Onset   • Coronary artery disease Mother    • Hyperlipidemia Mother    • Anemia Mother    • Cervical cancer Mother    • Kidney failure Father    • Heart failure Father    • Fibromyalgia Sister    • Anemia Sister    • Clotting disorder Sister    • Neuropathy Sister    • Migraines Sister    • Alcohol abuse Brother    • Cancer Maternal Grandmother    • Diabetes Maternal Grandmother    • Heart failure Maternal Grandfather    • No Known Problems Paternal Grandmother    • No Known Problems Paternal Grandfather    • Colon cancer Brother    • Stroke Maternal Aunt      Social History     Tobacco Use   • Smoking status: Never   • Smokeless tobacco: Never   Vaping Use   • Vaping Use: Never used   Substance Use Topics   • Alcohol use: No   • Drug use: No     Review of Systems   Psychiatric/Behavioral: Positive for sleep disturbance.         Objective   Vital Signs:  Heart Rate:  [68] 68  BP: (142)/(92) 142/92      03/22/23  1119   Weight: 107 kg (235 lb)     170.2 cm (67\")  Body mass index is 36.81 kg/m².    Physical Exam  Vitals reviewed.   Constitutional:       Appearance: Normal appearance.   HENT:      Head: Normocephalic.      Mouth/Throat:      Pharynx: Oropharynx is clear.   Eyes:      General: Lids are normal.      Extraocular Movements: Extraocular movements intact.      Pupils: Pupils are equal, round, and reactive to light.   Cardiovascular:      Rate and Rhythm: Normal rate and regular rhythm.      Pulses: Normal pulses.   Pulmonary:      Effort: Pulmonary effort is normal.   Musculoskeletal:         General: Normal range of motion.      Cervical back: Normal range of motion and neck supple.   Skin:     General: Skin is warm and dry.      Capillary Refill: Capillary refill takes less than 2 seconds. "   Neurological:      Motor: Motor strength is normal.      Coordination: Coordination is intact.      Deep Tendon Reflexes: Reflexes are normal and symmetric.   Psychiatric:         Mood and Affect: Mood normal.         Speech: Speech normal.       Neurological Exam  Mental Status  Awake, alert and oriented to person, place and time. Recent and remote memory are intact. Speech is normal. Language is fluent with no aphasia. Attention and concentration are normal.    Cranial Nerves  CN II: Visual acuity is normal. Visual fields full to confrontation.  CN III, IV, VI: Extraocular movements intact bilaterally. Normal lids and orbits bilaterally. Pupils equal round and reactive to light bilaterally.  CN V: Facial sensation is normal.  CN VII: Full and symmetric facial movement.  CN IX, X: Palate elevates symmetrically. Normal gag reflex.  CN XI: Shoulder shrug strength is normal.  CN XII: Tongue midline without atrophy or fasciculations.    Motor   Strength is 5/5 throughout all four extremities.    Sensory  Sensation is intact to light touch, pinprick, vibration and proprioception in all four extremities.    Reflexes  Deep tendon reflexes are 2+ and symmetric in all four extremities.    Coordination    Finger-to-nose, rapid alternating movements and heel-to-shin normal bilaterally without dysmetria.    Gait  Normal casual, toe, heel and tandem gait.    Compliance Report:  There is no compliance report available today as patient is not using her CPAP machine.     Results Review:    Lab Results   Component Value Date    GLUCOSE 75 10/05/2022    BUN 27 (H) 10/05/2022    CREATININE 1.3 (H) 10/05/2022    EGFRIFNONA 40 (A) 10/05/2022    EGFRIFAFRI 49 (L) 10/05/2022    K 4.4 10/05/2022    CO2 24 10/05/2022    CALCIUM 9.0 10/05/2022    ALBUMIN 4.5 10/05/2022    AST 19 10/05/2022    ALT 10 10/05/2022     Lab Results   Component Value Date    WBC 4.30 03/10/2023    HGB 11.4 03/10/2023    HCT 36.8 03/10/2023    MCV 95.3 (H)  03/10/2023     (L) 03/10/2023     Lab Results   Component Value Date    CHLPL 177 02/03/2023    TRIG 46 02/03/2023     02/03/2023    LDL 65 02/03/2023     Lab Results   Component Value Date    TSH 1.530 02/03/2023     Lab Results   Component Value Date    HGBA1C 5.3 02/03/2023     Lab Results   Component Value Date    FOLATE 10.2 07/01/2021     Lab Results   Component Value Date    VSLRLWPK98 >2,000 (H) 02/03/2023        Plan .  Assessment:  Veronica Stewart is a 72 y.o. female who presents with obstructive sleep apnea.  She does have her CPAP but continues to not use it secondary to multiple different socioeconomic factors.  She does continue with symptoms of obstructive sleep apnea which do include nonrestorative sleep, restless sleep, snoring, and daytime somnolence.  I have highly encouraged her to restart use of her CPAP as this will help mitigate her symptoms.  Additionally, she works overnights which does affect her fatigue.  Again, I do recommend restarting use of CPAP and I have explained this to the patient in full detail and she states complete understanding.    Plan:  • Continue CPAP.  Encouraged Compliance  • Recommend a regular sleep schedule and sleep hygiene  • Discussed risks of untreated sleep apnea  • Recommend regular physical activity and a balanced diet  • Call me with any questions or concerns.    The patient and I have discussed the plan of care and she is in full agreement at this time.     Follow-Up:  Return in about 5 months (around 8/22/2023) for Obstructive sleep apnea.       Caleb Baldwin, CATRACHITO  03/22/23  12:16 CDT

## 2023-03-27 ENCOUNTER — ANTI-COAG VISIT (OUTPATIENT)
Dept: PRIMARY CARE CLINIC | Age: 73
End: 2023-03-27
Payer: COMMERCIAL

## 2023-03-27 ENCOUNTER — TELEPHONE (OUTPATIENT)
Dept: PRIMARY CARE CLINIC | Age: 73
End: 2023-03-27

## 2023-03-27 DIAGNOSIS — Z79.01 ANTICOAGULATED ON COUMADIN: Primary | ICD-10-CM

## 2023-03-27 LAB — INR BLD: 1.6

## 2023-03-27 PROCEDURE — 93793 ANTICOAG MGMT PT WARFARIN: CPT | Performed by: INTERNAL MEDICINE

## 2023-03-27 NOTE — TELEPHONE ENCOUNTER
Spoke with Sergio Body. She states she has been really busy and has forgotten to do her INR. She agreed to check it today and call in the result.  Kashif MCDUFFIE

## 2023-03-27 NOTE — PROGRESS NOTES
HOME MONITORING REPORT    INR today:   Results for orders placed or performed in visit on 03/27/23   Protime-INR   Result Value Ref Range    INR 1.60        INR Goal: 2.0-3.0    Dosing Plan  As of 3/27/2023      TTR:  30.8 % (4.9 y)   Full warfarin instructions:  3/27: 11.25 mg; 3/30: 11.25 mg; Otherwise 11.25 mg every Sun; 7.5 mg all other days; Starting 3/27/2023            PATIENT ADMITS SHE HAS BEEN MISSING DOSES OF ALL MEDS. WE DISCUSSED THE IMPORTANCE OF TAKING MEDICATIONS DAILY,    PLAN: Advised patient/caregiver to increase her dose tonight and on Wednesday to 11.25 mg, then continue current dose and recheck in one week.   Patient/Caregiver voiced understanding

## 2023-04-01 NOTE — PLAN OF CARE
Problem: Wound:  Goal: Signs of wound healing will improve  Description: Signs of wound healing will improve  Outcome: Ongoing   Wound is smaller  Problem: Blood Glucose:  Goal: Ability to maintain appropriate glucose levels will improve  Description: Ability to maintain appropriate glucose levels will improve  Outcome: Ongoing     Problem: Falls - Risk of:  Goal: Will remain free from falls  Description: Will remain free from falls  Outcome: Ongoing  Goal: Absence of physical injury  Description: Absence of physical injury  Outcome: Ongoing     Problem: Pain:  Goal: Pain level will decrease  Description: Pain level will decrease  Outcome: Ongoing  Goal: Control of acute pain  Description: Control of acute pain  Outcome: Ongoing  Goal: Control of chronic pain  Description: Control of chronic pain  Outcome: Ongoing
none

## 2023-04-20 LAB — INR BLD: 1.8

## 2023-04-21 ENCOUNTER — ANTI-COAG VISIT (OUTPATIENT)
Dept: INTERNAL MEDICINE | Age: 73
End: 2023-04-21
Payer: MEDICARE

## 2023-04-21 DIAGNOSIS — Z79.01 ANTICOAGULATED ON COUMADIN: Primary | ICD-10-CM

## 2023-04-21 PROCEDURE — 99999 PR OFFICE/OUTPT VISIT,PROCEDURE ONLY: CPT | Performed by: INTERNAL MEDICINE

## 2023-04-21 PROCEDURE — 93793 ANTICOAG MGMT PT WARFARIN: CPT | Performed by: INTERNAL MEDICINE

## 2023-04-21 NOTE — PROGRESS NOTES
HOME MONITORING REPORT    INR today:   Results for orders placed or performed in visit on 04/21/23   Protime-INR   Result Value Ref Range    INR 1.80        INR Goal: 2.0-3.0    Dosing Plan  As of 4/21/2023      TTR:  30.4 % (4.9 y)   Full warfarin instructions:  11.25 mg every Sun; 7.5 mg all other days                PLAN: LM  for pt that said to take 15 mg tonight. Resume current dose tomorrow.  Back to coumadin clinic next week

## 2023-04-23 DIAGNOSIS — G62.9 NEUROPATHY: ICD-10-CM

## 2023-04-24 RX ORDER — GABAPENTIN 300 MG/1
CAPSULE ORAL
Qty: 270 CAPSULE | Refills: 0 | OUTPATIENT
Start: 2023-04-24

## 2023-04-27 RX ORDER — MONTELUKAST SODIUM 10 MG/1
TABLET ORAL
Qty: 90 TABLET | Refills: 0 | Status: SHIPPED | OUTPATIENT
Start: 2023-04-27

## 2023-04-27 NOTE — TELEPHONE ENCOUNTER
Amelia Naranjo called to request a refill on her medication.       Last office visit : 2/3/2023   Next office visit : 5/5/2023     Requested Prescriptions     Pending Prescriptions Disp Refills    montelukast (SINGULAIR) 10 MG tablet [Pharmacy Med Name: Montelukast Sodium 10 MG Oral Tablet] 90 tablet 0     Sig: Take 1 tablet by mouth once daily            Frank Meraz MA

## 2023-04-28 DIAGNOSIS — E11.69 TYPE 2 DIABETES MELLITUS WITH OTHER SPECIFIED COMPLICATION, UNSPECIFIED WHETHER LONG TERM INSULIN USE (HCC): ICD-10-CM

## 2023-04-28 LAB
25(OH)D3 SERPL-MCNC: 31.7 NG/ML
ALBUMIN SERPL-MCNC: 4.3 G/DL (ref 3.5–5.2)
ALP SERPL-CCNC: 77 U/L (ref 35–104)
ALT SERPL-CCNC: 14 U/L (ref 5–33)
ANION GAP SERPL CALCULATED.3IONS-SCNC: 10 MMOL/L (ref 7–19)
AST SERPL-CCNC: 24 U/L (ref 5–32)
BASOPHILS # BLD: 0 K/UL (ref 0–0.2)
BASOPHILS NFR BLD: 0.4 % (ref 0–1)
BILIRUB SERPL-MCNC: 0.4 MG/DL (ref 0.2–1.2)
BUN SERPL-MCNC: 26 MG/DL (ref 8–23)
CALCIUM SERPL-MCNC: 9.5 MG/DL (ref 8.8–10.2)
CHLORIDE SERPL-SCNC: 107 MMOL/L (ref 98–111)
CHOLEST SERPL-MCNC: 203 MG/DL (ref 160–199)
CO2 SERPL-SCNC: 24 MMOL/L (ref 22–29)
CREAT SERPL-MCNC: 1.1 MG/DL (ref 0.5–0.9)
CREAT UR-MCNC: 139.7 MG/DL (ref 4.2–622)
EOSINOPHIL # BLD: 0 K/UL (ref 0–0.6)
EOSINOPHIL NFR BLD: 0.6 % (ref 0–5)
ERYTHROCYTE [DISTWIDTH] IN BLOOD BY AUTOMATED COUNT: 15.9 % (ref 11.5–14.5)
GLUCOSE SERPL-MCNC: 96 MG/DL (ref 74–109)
HBA1C MFR BLD: 5.4 % (ref 4–6)
HCT VFR BLD AUTO: 39 % (ref 37–47)
HDLC SERPL-MCNC: 105 MG/DL (ref 65–121)
HGB BLD-MCNC: 11.9 G/DL (ref 12–16)
IMM GRANULOCYTES # BLD: 0 K/UL
LDLC SERPL CALC-MCNC: 82 MG/DL
LYMPHOCYTES # BLD: 1.5 K/UL (ref 1.1–4.5)
LYMPHOCYTES NFR BLD: 29.7 % (ref 20–40)
MCH RBC QN AUTO: 29.5 PG (ref 27–31)
MCHC RBC AUTO-ENTMCNC: 30.5 G/DL (ref 33–37)
MCV RBC AUTO: 96.5 FL (ref 81–99)
MICROALBUMIN UR-MCNC: <1.2 MG/DL (ref 0–19)
MICROALBUMIN/CREAT UR-RTO: NORMAL MG/G
MONOCYTES # BLD: 0.5 K/UL (ref 0–0.9)
MONOCYTES NFR BLD: 9.3 % (ref 0–10)
NEUTROPHILS # BLD: 3.1 K/UL (ref 1.5–7.5)
NEUTS SEG NFR BLD: 59.8 % (ref 50–65)
PLATELET # BLD AUTO: 185 K/UL (ref 130–400)
PMV BLD AUTO: 11.6 FL (ref 9.4–12.3)
POTASSIUM SERPL-SCNC: 4.3 MMOL/L (ref 3.5–5)
PROT SERPL-MCNC: 8.2 G/DL (ref 6.6–8.7)
RBC # BLD AUTO: 4.04 M/UL (ref 4.2–5.4)
SODIUM SERPL-SCNC: 141 MMOL/L (ref 136–145)
TRIGL SERPL-MCNC: 80 MG/DL (ref 0–149)
VIT B12 SERPL-MCNC: 709 PG/ML (ref 211–946)
WBC # BLD AUTO: 5.2 K/UL (ref 4.8–10.8)

## 2023-05-02 SDOH — HEALTH STABILITY: PHYSICAL HEALTH: ON AVERAGE, HOW MANY MINUTES DO YOU ENGAGE IN EXERCISE AT THIS LEVEL?: 0 MIN

## 2023-05-02 SDOH — HEALTH STABILITY: PHYSICAL HEALTH: ON AVERAGE, HOW MANY DAYS PER WEEK DO YOU ENGAGE IN MODERATE TO STRENUOUS EXERCISE (LIKE A BRISK WALK)?: 0 DAYS

## 2023-05-02 ASSESSMENT — LIFESTYLE VARIABLES
HOW OFTEN DO YOU HAVE A DRINK CONTAINING ALCOHOL: NEVER
HOW OFTEN DO YOU HAVE A DRINK CONTAINING ALCOHOL: 1
HOW MANY STANDARD DRINKS CONTAINING ALCOHOL DO YOU HAVE ON A TYPICAL DAY: PATIENT DOES NOT DRINK
HOW OFTEN DO YOU HAVE SIX OR MORE DRINKS ON ONE OCCASION: 1
HOW MANY STANDARD DRINKS CONTAINING ALCOHOL DO YOU HAVE ON A TYPICAL DAY: 0

## 2023-05-02 ASSESSMENT — PATIENT HEALTH QUESTIONNAIRE - PHQ9
SUM OF ALL RESPONSES TO PHQ QUESTIONS 1-9: 22
SUM OF ALL RESPONSES TO PHQ QUESTIONS 1-9: 22
8. MOVING OR SPEAKING SO SLOWLY THAT OTHER PEOPLE COULD HAVE NOTICED. OR THE OPPOSITE, BEING SO FIGETY OR RESTLESS THAT YOU HAVE BEEN MOVING AROUND A LOT MORE THAN USUAL: 2
5. POOR APPETITE OR OVEREATING: 2
SUM OF ALL RESPONSES TO PHQ QUESTIONS 1-9: 22
7. TROUBLE CONCENTRATING ON THINGS, SUCH AS READING THE NEWSPAPER OR WATCHING TELEVISION: 3
SUM OF ALL RESPONSES TO PHQ9 QUESTIONS 1 & 2: 6
4. FEELING TIRED OR HAVING LITTLE ENERGY: 3
SUM OF ALL RESPONSES TO PHQ QUESTIONS 1-9: 22
3. TROUBLE FALLING OR STAYING ASLEEP: 3
10. IF YOU CHECKED OFF ANY PROBLEMS, HOW DIFFICULT HAVE THESE PROBLEMS MADE IT FOR YOU TO DO YOUR WORK, TAKE CARE OF THINGS AT HOME, OR GET ALONG WITH OTHER PEOPLE: 1
1. LITTLE INTEREST OR PLEASURE IN DOING THINGS: 3
9. THOUGHTS THAT YOU WOULD BE BETTER OFF DEAD, OR OF HURTING YOURSELF: 0
6. FEELING BAD ABOUT YOURSELF - OR THAT YOU ARE A FAILURE OR HAVE LET YOURSELF OR YOUR FAMILY DOWN: 3
2. FEELING DOWN, DEPRESSED OR HOPELESS: 3

## 2023-05-05 ENCOUNTER — OFFICE VISIT (OUTPATIENT)
Dept: INTERNAL MEDICINE | Age: 73
End: 2023-05-05
Payer: MEDICARE

## 2023-05-05 VITALS
SYSTOLIC BLOOD PRESSURE: 122 MMHG | HEART RATE: 84 BPM | DIASTOLIC BLOOD PRESSURE: 88 MMHG | WEIGHT: 241.4 LBS | OXYGEN SATURATION: 97 % | HEIGHT: 67 IN | BODY MASS INDEX: 37.89 KG/M2

## 2023-05-05 DIAGNOSIS — E78.5 HYPERLIPIDEMIA, UNSPECIFIED HYPERLIPIDEMIA TYPE: ICD-10-CM

## 2023-05-05 DIAGNOSIS — Z23 NEED FOR SHINGLES VACCINE: ICD-10-CM

## 2023-05-05 DIAGNOSIS — F32.89 OTHER DEPRESSION: ICD-10-CM

## 2023-05-05 DIAGNOSIS — G89.29 CHRONIC LOW BACK PAIN, UNSPECIFIED BACK PAIN LATERALITY, UNSPECIFIED WHETHER SCIATICA PRESENT: ICD-10-CM

## 2023-05-05 DIAGNOSIS — D50.9 IRON DEFICIENCY ANEMIA, UNSPECIFIED IRON DEFICIENCY ANEMIA TYPE: ICD-10-CM

## 2023-05-05 DIAGNOSIS — E03.9 HYPOTHYROIDISM, UNSPECIFIED TYPE: ICD-10-CM

## 2023-05-05 DIAGNOSIS — J45.20 MILD INTERMITTENT REACTIVE AIRWAY DISEASE WITHOUT COMPLICATION: ICD-10-CM

## 2023-05-05 DIAGNOSIS — I48.91 ATRIAL FIBRILLATION, UNSPECIFIED TYPE (HCC): ICD-10-CM

## 2023-05-05 DIAGNOSIS — Z00.00 ROUTINE HEALTH MAINTENANCE: Primary | ICD-10-CM

## 2023-05-05 DIAGNOSIS — Z12.31 ENCOUNTER FOR SCREENING MAMMOGRAM FOR MALIGNANT NEOPLASM OF BREAST: ICD-10-CM

## 2023-05-05 DIAGNOSIS — I49.5 SICK SINUS SYNDROME (HCC): ICD-10-CM

## 2023-05-05 DIAGNOSIS — K29.50 CHRONIC GASTRITIS, PRESENCE OF BLEEDING UNSPECIFIED, UNSPECIFIED GASTRITIS TYPE: ICD-10-CM

## 2023-05-05 DIAGNOSIS — Z91.09 ENVIRONMENTAL ALLERGIES: ICD-10-CM

## 2023-05-05 DIAGNOSIS — R60.0 LOWER EXTREMITY EDEMA: ICD-10-CM

## 2023-05-05 DIAGNOSIS — M54.50 CHRONIC LOW BACK PAIN, UNSPECIFIED BACK PAIN LATERALITY, UNSPECIFIED WHETHER SCIATICA PRESENT: ICD-10-CM

## 2023-05-05 DIAGNOSIS — E53.8 B12 DEFICIENCY: ICD-10-CM

## 2023-05-05 DIAGNOSIS — I10 ESSENTIAL HYPERTENSION: ICD-10-CM

## 2023-05-05 DIAGNOSIS — E11.21 TYPE II DIABETES MELLITUS WITH NEPHROPATHY (HCC): ICD-10-CM

## 2023-05-05 DIAGNOSIS — G62.9 NEUROPATHY: ICD-10-CM

## 2023-05-05 DIAGNOSIS — M32.9 H/O SYSTEMIC LUPUS ERYTHEMATOSUS (SLE) (HCC): ICD-10-CM

## 2023-05-05 DIAGNOSIS — K21.9 GASTROESOPHAGEAL REFLUX DISEASE WITHOUT ESOPHAGITIS: ICD-10-CM

## 2023-05-05 DIAGNOSIS — E43 UNSPECIFIED SEVERE PROTEIN-CALORIE MALNUTRITION (HCC): ICD-10-CM

## 2023-05-05 DIAGNOSIS — E55.9 VITAMIN D DEFICIENCY: ICD-10-CM

## 2023-05-05 PROCEDURE — 3017F COLORECTAL CA SCREEN DOC REV: CPT | Performed by: INTERNAL MEDICINE

## 2023-05-05 PROCEDURE — 96372 THER/PROPH/DIAG INJ SC/IM: CPT | Performed by: INTERNAL MEDICINE

## 2023-05-05 PROCEDURE — G0439 PPPS, SUBSEQ VISIT: HCPCS | Performed by: INTERNAL MEDICINE

## 2023-05-05 PROCEDURE — 3044F HG A1C LEVEL LT 7.0%: CPT | Performed by: INTERNAL MEDICINE

## 2023-05-05 PROCEDURE — 1123F ACP DISCUSS/DSCN MKR DOCD: CPT | Performed by: INTERNAL MEDICINE

## 2023-05-05 PROCEDURE — 3078F DIAST BP <80 MM HG: CPT | Performed by: INTERNAL MEDICINE

## 2023-05-05 PROCEDURE — 3074F SYST BP LT 130 MM HG: CPT | Performed by: INTERNAL MEDICINE

## 2023-05-05 RX ORDER — ZOSTER VACCINE RECOMBINANT, ADJUVANTED 50 MCG/0.5
0.5 KIT INTRAMUSCULAR SEE ADMIN INSTRUCTIONS
Qty: 0.5 ML | Refills: 0 | Status: SHIPPED | OUTPATIENT
Start: 2023-05-05 | End: 2023-11-01

## 2023-05-05 RX ORDER — HYDROCORTISONE 10 MG/G
CREAM TOPICAL
Qty: 1 EACH | Refills: 1 | Status: SHIPPED | OUTPATIENT
Start: 2023-05-05

## 2023-05-05 RX ORDER — GABAPENTIN 300 MG/1
300 CAPSULE ORAL 3 TIMES DAILY
Qty: 270 CAPSULE | Refills: 0 | Status: CANCELLED | OUTPATIENT
Start: 2023-05-05 | End: 2023-08-02

## 2023-05-05 RX ORDER — BUMETANIDE 2 MG/1
1 TABLET ORAL DAILY PRN
Qty: 90 TABLET | Refills: 0 | Status: SHIPPED | OUTPATIENT
Start: 2023-05-05 | End: 2023-07-03 | Stop reason: SDUPTHER

## 2023-05-05 RX ORDER — CYANOCOBALAMIN 1000 UG/ML
1000 INJECTION, SOLUTION INTRAMUSCULAR; SUBCUTANEOUS ONCE
Status: COMPLETED | OUTPATIENT
Start: 2023-05-05 | End: 2023-05-05

## 2023-05-05 RX ORDER — GABAPENTIN 300 MG/1
300 CAPSULE ORAL 3 TIMES DAILY
Qty: 270 CAPSULE | Refills: 0 | Status: SHIPPED | OUTPATIENT
Start: 2023-05-05 | End: 2023-08-02

## 2023-05-05 RX ORDER — CLOBETASOL PROPIONATE 0.5 MG/G
CREAM TOPICAL
Qty: 3 EACH | Refills: 2 | Status: SHIPPED | OUTPATIENT
Start: 2023-05-05

## 2023-05-05 RX ORDER — FEXOFENADINE HCL 180 MG/1
TABLET ORAL
Qty: 90 TABLET | Refills: 3 | Status: SHIPPED | OUTPATIENT
Start: 2023-05-05

## 2023-05-05 RX ORDER — POTASSIUM CHLORIDE 750 MG/1
10 TABLET, FILM COATED, EXTENDED RELEASE ORAL DAILY
Qty: 60 TABLET | Refills: 5 | Status: SHIPPED | OUTPATIENT
Start: 2023-05-05 | End: 2023-07-03 | Stop reason: SDUPTHER

## 2023-05-05 RX ORDER — BACLOFEN 10 MG/1
10 TABLET ORAL 3 TIMES DAILY
Qty: 30 TABLET | Refills: 1 | Status: SHIPPED | OUTPATIENT
Start: 2023-05-05 | End: 2023-07-03 | Stop reason: SDUPTHER

## 2023-05-05 RX ORDER — CALCIPOTRIENE 50 UG/G
CREAM TOPICAL
Qty: 3 EACH | Refills: 1 | Status: SHIPPED | OUTPATIENT
Start: 2023-05-05

## 2023-05-05 RX ADMIN — CYANOCOBALAMIN 1000 MCG: 1000 INJECTION, SOLUTION INTRAMUSCULAR; SUBCUTANEOUS at 09:14

## 2023-05-05 SDOH — ECONOMIC STABILITY: FOOD INSECURITY: WITHIN THE PAST 12 MONTHS, THE FOOD YOU BOUGHT JUST DIDN'T LAST AND YOU DIDN'T HAVE MONEY TO GET MORE.: NEVER TRUE

## 2023-05-05 SDOH — ECONOMIC STABILITY: HOUSING INSECURITY
IN THE LAST 12 MONTHS, WAS THERE A TIME WHEN YOU DID NOT HAVE A STEADY PLACE TO SLEEP OR SLEPT IN A SHELTER (INCLUDING NOW)?: YES

## 2023-05-05 SDOH — ECONOMIC STABILITY: FOOD INSECURITY: WITHIN THE PAST 12 MONTHS, YOU WORRIED THAT YOUR FOOD WOULD RUN OUT BEFORE YOU GOT MONEY TO BUY MORE.: NEVER TRUE

## 2023-05-05 SDOH — ECONOMIC STABILITY: INCOME INSECURITY: HOW HARD IS IT FOR YOU TO PAY FOR THE VERY BASICS LIKE FOOD, HOUSING, MEDICAL CARE, AND HEATING?: HARD

## 2023-05-05 NOTE — PROGRESS NOTES
Coat's syndrome     both eyes    COPD (chronic obstructive pulmonary disease) (HCC)     Depression     Diabetes mellitus type 2 in nonobese (HCC)     Diabetic nephropathy (HCC)     Disequilibrium     Dizziness     DVT (deep venous thrombosis) (HCC)     Exudative retinopathy     Fibromyalgia     Fibromyositis     Gastric ulcer     GERD (gastroesophageal reflux disease)     History of gastric bypass     Hx of blood clots     Hx of lupus anticoagulant disorder     Hyperlipidemia 5/6/2019    Hypertension     Hypothyroidism     Intermittent claudication (HCC)     Intestinal obstruction (HCC)     Iron deficiency     Left-sided weakness     Low vitamin D level     Lupus (HCC)     Menopause     Obesity     Osteoarthritis     Osteoporosis     Other iron deficiency anemias     Palliative care patient 09/24/2020    Pernicious anemia     PONV (postoperative nausea and vomiting)     PUPP (pruritic urticarial papules and plaques of pregnancy)     Restless legs syndrome (RLS) 7/11/2019    Right leg numbness     Right sided sciatica     Sarcoidosis     with liver involvement    Sciatica     Secondary hyperparathyroidism ([address]     Shingles     Shortness of breath     Sleep apnea     Stomach ulcer     Syncope     Visual loss, one eye          Past Surgical History:   Procedure Laterality Date    APPENDECTOMY      CARDIAC CATHETERIZATION  10/21/13  [facility]    EF over 60%    CHOLECYSTECTOMY      COLONOSCOPY  02/2010    negative    COLONOSCOPY  02/22/2010    Dr [facility]    COLONOSCOPY  04/01/2016    Dr LAKHWINDER Horvath-internal hemorrhoids, 5 yr recall    COLONOSCOPY N/A 05/07/2021    Dr Laureen Horvath-Significant looping/tortuosity throughout the left colon, BE=Normal    DILATATION, ESOPHAGUS      EYE SURGERY      Cyst on Right eye    EYE SURGERY      GASTRIC BYPASS SURGERY      GASTRIC BYPASS SURGERY      HERNIA REPAIR      HYSTERECTOMY (CERVIX STATUS UNKNOWN)  1974    Complete    INCONTINENCE SURGERY      Bladder Sling    INFECTED SKIN DEBRIDEMENT

## 2023-05-05 NOTE — TELEPHONE ENCOUNTER
Last OV 5/5/2023  Next OV 5/5/2023      Requested Prescriptions     Pending Prescriptions Disp Refills    fexofenadine (ALLEGRA) 180 MG tablet 90 tablet 3     Sig: Take 1 tablet by mouth once daily

## 2023-05-06 ASSESSMENT — ENCOUNTER SYMPTOMS
ABDOMINAL PAIN: 0
TROUBLE SWALLOWING: 0
VOMITING: 0
COLOR CHANGE: 0
COUGH: 0
RHINORRHEA: 0
SHORTNESS OF BREATH: 0
VOICE CHANGE: 0
STRIDOR: 0
WHEEZING: 0
CHEST TIGHTNESS: 0
EYE DISCHARGE: 0
SINUS PAIN: 0
EYE ITCHING: 0
ABDOMINAL DISTENTION: 0
CONSTIPATION: 0
APNEA: 0
SORE THROAT: 0
BACK PAIN: 1
SINUS PRESSURE: 0
NAUSEA: 0
BLOOD IN STOOL: 0
DIARRHEA: 0

## 2023-05-08 ENCOUNTER — ANTI-COAG VISIT (OUTPATIENT)
Dept: PRIMARY CARE CLINIC | Age: 73
End: 2023-05-08
Payer: MEDICARE

## 2023-05-08 DIAGNOSIS — Z79.01 ANTICOAGULATED ON COUMADIN: Primary | ICD-10-CM

## 2023-05-08 LAB — INR BLD: 2.4

## 2023-05-08 PROCEDURE — 93793 ANTICOAG MGMT PT WARFARIN: CPT | Performed by: INTERNAL MEDICINE

## 2023-05-08 NOTE — PROGRESS NOTES
HOME MONITORING REPORT    INR today:   Results for orders placed or performed in visit on 05/08/23   Protime-INR   Result Value Ref Range    INR 2.40        INR Goal: 2.0-3.0    Dosing Plan  As of 5/8/2023      TTR:  30.8 % (5 y)   Full warfarin instructions:  11.25 mg every Sun; 7.5 mg all other days                PLAN: Advised patient/caregiver to continue current dose and recheck in one week.   Patient/Caregiver voiced understanding    Electronically signed by Stormy Montenegro MD on 5/8/2023 at 9:03 AM

## 2023-05-15 LAB — INR BLD: 2.4

## 2023-05-22 ENCOUNTER — TELEPHONE (OUTPATIENT)
Dept: PRIMARY CARE CLINIC | Age: 73
End: 2023-05-22

## 2023-05-22 NOTE — TELEPHONE ENCOUNTER
Called and left another message on patient's voicemail asking her to check her INR.  Clearance Lillian, [facility] Britany Tucker

## 2023-05-23 ENCOUNTER — TELEPHONE (OUTPATIENT)
Dept: PRIMARY CARE CLINIC | Age: 73
End: 2023-05-23

## 2023-05-23 NOTE — TELEPHONE ENCOUNTER
Called and spoke with patient and requested that she check her INR. She stated she agreed to this plan and would check today.  Polly MCDUFFIE

## 2023-05-24 LAB — INR BLD: 2.1

## 2023-05-25 ENCOUNTER — ANTI-COAG VISIT (OUTPATIENT)
Dept: INTERNAL MEDICINE | Age: 73
End: 2023-05-25
Payer: MEDICARE

## 2023-05-25 DIAGNOSIS — Z79.01 ANTICOAGULATED ON COUMADIN: Primary | ICD-10-CM

## 2023-05-25 PROCEDURE — 99999 PR OFFICE/OUTPT VISIT,PROCEDURE ONLY: CPT | Performed by: INTERNAL MEDICINE

## 2023-05-25 PROCEDURE — 93793 ANTICOAG MGMT PT WARFARIN: CPT | Performed by: INTERNAL MEDICINE

## 2023-05-25 NOTE — PROGRESS NOTES
HOME MONITORING REPORT    INR today:   Results for orders placed or performed in visit on 05/25/23   Protime-INR   Result Value Ref Range    INR 2.40    Protime-INR   Result Value Ref Range    INR 2.10        INR Goal: 2.0-3.0    Dosing Plan  As of 5/25/2023      TTR:  31.4 % (5 y)   Full warfarin instructions:  11.25 mg every Sun; 7.5 mg all other days                PLAN: Advised patient/caregiver to continue current dose and recheck in one week.   Patient/Caregiver voiced understanding

## 2023-06-27 ENCOUNTER — TELEPHONE (OUTPATIENT)
Dept: INTERNAL MEDICINE | Age: 73
End: 2023-06-27

## 2023-06-27 LAB — INR BLD: 2.8

## 2023-06-28 ENCOUNTER — TELEPHONE (OUTPATIENT)
Dept: INTERNAL MEDICINE | Age: 73
End: 2023-06-28

## 2023-06-28 ENCOUNTER — ANTI-COAG VISIT (OUTPATIENT)
Dept: INTERNAL MEDICINE | Age: 73
End: 2023-06-28

## 2023-06-28 DIAGNOSIS — Z79.01 ANTICOAGULATED ON COUMADIN: Primary | ICD-10-CM

## 2023-07-03 ENCOUNTER — TELEPHONE (OUTPATIENT)
Dept: INTERNAL MEDICINE | Age: 73
End: 2023-07-03

## 2023-07-03 DIAGNOSIS — K52.9 GASTROENTERITIS: ICD-10-CM

## 2023-07-03 DIAGNOSIS — Z91.09 ENVIRONMENTAL ALLERGIES: ICD-10-CM

## 2023-07-03 DIAGNOSIS — I82.5Y9 CHRONIC DEEP VEIN THROMBOSIS (DVT) OF PROXIMAL VEIN OF LOWER EXTREMITY, UNSPECIFIED LATERALITY (HCC): ICD-10-CM

## 2023-07-03 DIAGNOSIS — Z79.01 ANTICOAGULATED ON COUMADIN: ICD-10-CM

## 2023-07-03 DIAGNOSIS — K21.9 GASTROESOPHAGEAL REFLUX DISEASE WITHOUT ESOPHAGITIS: ICD-10-CM

## 2023-07-03 DIAGNOSIS — I10 ESSENTIAL HYPERTENSION: ICD-10-CM

## 2023-07-03 DIAGNOSIS — D50.9 IRON DEFICIENCY ANEMIA, UNSPECIFIED IRON DEFICIENCY ANEMIA TYPE: ICD-10-CM

## 2023-07-03 DIAGNOSIS — E78.5 HYPERLIPIDEMIA, UNSPECIFIED HYPERLIPIDEMIA TYPE: ICD-10-CM

## 2023-07-03 DIAGNOSIS — R12 CHRONIC HEARTBURN: ICD-10-CM

## 2023-07-03 RX ORDER — CALCITRIOL 0.25 UG/1
0.25 CAPSULE, LIQUID FILLED ORAL DAILY
Qty: 90 CAPSULE | Refills: 3 | Status: SHIPPED | OUTPATIENT
Start: 2023-07-03

## 2023-07-03 RX ORDER — ESCITALOPRAM OXALATE 20 MG/1
20 TABLET ORAL DAILY
Qty: 90 TABLET | Refills: 3 | Status: SHIPPED | OUTPATIENT
Start: 2023-07-03

## 2023-07-03 RX ORDER — PANTOPRAZOLE SODIUM 40 MG/1
TABLET, DELAYED RELEASE ORAL
Qty: 180 TABLET | Refills: 1 | Status: SHIPPED | OUTPATIENT
Start: 2023-07-03

## 2023-07-03 RX ORDER — POTASSIUM CHLORIDE 750 MG/1
10 TABLET, FILM COATED, EXTENDED RELEASE ORAL DAILY
Qty: 90 TABLET | Refills: 1 | Status: SHIPPED | OUTPATIENT
Start: 2023-07-03

## 2023-07-03 RX ORDER — LEVOTHYROXINE SODIUM 0.07 MG/1
75 TABLET ORAL DAILY
Qty: 90 TABLET | Refills: 1 | Status: SHIPPED | OUTPATIENT
Start: 2023-07-03

## 2023-07-03 RX ORDER — BUMETANIDE 2 MG/1
1 TABLET ORAL DAILY PRN
Qty: 90 TABLET | Refills: 0 | Status: SHIPPED | OUTPATIENT
Start: 2023-07-03

## 2023-07-03 RX ORDER — BACLOFEN 10 MG/1
10 TABLET ORAL 3 TIMES DAILY
Qty: 30 TABLET | Refills: 1 | Status: SHIPPED | OUTPATIENT
Start: 2023-07-03 | End: 2023-08-02

## 2023-07-03 RX ORDER — ROSUVASTATIN CALCIUM 5 MG/1
5 TABLET, COATED ORAL DAILY
Qty: 90 TABLET | Refills: 3 | Status: SHIPPED | OUTPATIENT
Start: 2023-07-03

## 2023-07-03 RX ORDER — ARIPIPRAZOLE 5 MG/1
5 TABLET ORAL DAILY
Qty: 90 TABLET | Refills: 1 | Status: SHIPPED | OUTPATIENT
Start: 2023-07-03 | End: 2023-10-01

## 2023-07-03 RX ORDER — QUINIDINE GLUCONATE 324 MG
TABLET, EXTENDED RELEASE ORAL
Qty: 180 TABLET | Refills: 0 | Status: SHIPPED | OUTPATIENT
Start: 2023-07-03 | End: 2023-08-16

## 2023-07-03 RX ORDER — FEXOFENADINE HCL 180 MG/1
TABLET ORAL
Qty: 90 TABLET | Refills: 3 | Status: SHIPPED | OUTPATIENT
Start: 2023-07-03

## 2023-07-03 RX ORDER — MONTELUKAST SODIUM 10 MG/1
10 TABLET ORAL DAILY
Qty: 90 TABLET | Refills: 1 | Status: SHIPPED | OUTPATIENT
Start: 2023-07-03

## 2023-07-03 RX ORDER — SUCRALFATE 1 G/1
1 TABLET ORAL 4 TIMES DAILY
Qty: 360 TABLET | Refills: 0 | Status: SHIPPED | OUTPATIENT
Start: 2023-07-03

## 2023-07-03 RX ORDER — WARFARIN SODIUM 7.5 MG/1
TABLET ORAL
Qty: 180 TABLET | Refills: 3 | Status: SHIPPED | OUTPATIENT
Start: 2023-07-03

## 2023-07-03 NOTE — TELEPHONE ENCOUNTER
Pharmacy called and stated they will not be able to refill pts sucralfate (CARAFATE)  Because the medication is on back order.

## 2023-07-05 ENCOUNTER — ANTI-COAG VISIT (OUTPATIENT)
Dept: INTERNAL MEDICINE | Age: 73
End: 2023-07-05
Payer: MEDICARE

## 2023-07-05 DIAGNOSIS — Z79.01 ANTICOAGULATED ON COUMADIN: Primary | ICD-10-CM

## 2023-07-05 LAB — INR BLD: 0.8

## 2023-07-05 PROCEDURE — 93793 ANTICOAG MGMT PT WARFARIN: CPT | Performed by: INTERNAL MEDICINE

## 2023-07-05 PROCEDURE — 99999 PR OFFICE/OUTPT VISIT,PROCEDURE ONLY: CPT | Performed by: INTERNAL MEDICINE

## 2023-07-05 NOTE — PROGRESS NOTES
Spoke to the patient and she has not been taking her medication. She said that her insurance and pharmacy plan had gotten canceled so she has had way to get more medication and she was trying to stretch out what she had. She is working with an agent to get her insurance set up again. I let her know  to make sure she is taking her coumadin as prescribed and let us know what we need to do about her medications. She Vu and will let us know.

## 2023-07-05 NOTE — PROGRESS NOTES
HOME MONITORING REPORT    INR today:   Results for orders placed or performed in visit on 07/05/23   Protime-INR   Result Value Ref Range    INR 0.80        INR Goal: 2.0-3.0    Dosing Plan  As of 7/5/2023      TTR:  31.8 % (5.1 y)   Full warfarin instructions:  11.25 mg every Sun; 7.5 mg all other days                PLAN:

## 2023-07-07 ENCOUNTER — CARE COORDINATION (OUTPATIENT)
Dept: CARE COORDINATION | Age: 73
End: 2023-07-07

## 2023-07-07 NOTE — CARE COORDINATION
ACM attempted contact with patient and unable to reach. Left voicemail with my cell number for call back. Will await return call from patient.       [address] RN  Ambulatory Care Manager   C. [phone]

## 2023-07-12 ENCOUNTER — ANTI-COAG VISIT (OUTPATIENT)
Dept: PRIMARY CARE CLINIC | Age: 73
End: 2023-07-12
Payer: MEDICARE

## 2023-07-12 DIAGNOSIS — Z79.01 ANTICOAGULATED ON COUMADIN: Primary | ICD-10-CM

## 2023-07-12 LAB — INR BLD: 1.3

## 2023-07-12 PROCEDURE — 93793 ANTICOAG MGMT PT WARFARIN: CPT | Performed by: INTERNAL MEDICINE

## 2023-07-12 NOTE — PROGRESS NOTES
HOME MONITORING REPORT    INR today:   Results for orders placed or performed in visit on 07/12/23   Protime-INR   Result Value Ref Range    INR 1.30        INR Goal: 2.0-3.0    Dosing Plan  As of 7/12/2023      TTR:  31.6 % (5.2 y)   Full warfarin instructions:  11.25 mg every Sun; 7.5 mg all other days            She states she has medication now. PLAN: Advised patient/caregiver to increase her dose tonight to 15 mg then continue current dose and recheck in one week.   Patient/Caregiver voiced understanding

## 2023-07-14 DIAGNOSIS — G62.9 NEUROPATHY: ICD-10-CM

## 2023-07-14 RX ORDER — DULAGLUTIDE 0.75 MG/.5ML
0.75 INJECTION, SOLUTION SUBCUTANEOUS WEEKLY
Qty: 4 ADJUSTABLE DOSE PRE-FILLED PEN SYRINGE | Refills: 2 | Status: SHIPPED | OUTPATIENT
Start: 2023-07-14

## 2023-07-14 RX ORDER — GABAPENTIN 300 MG/1
300 CAPSULE ORAL 3 TIMES DAILY
Qty: 270 CAPSULE | Refills: 0 | Status: SHIPPED | OUTPATIENT
Start: 2023-07-14 | End: 2023-10-11

## 2023-07-14 NOTE — TELEPHONE ENCOUNTER
Jake Scanlon called to request a refill on her medication. Last office visit : 5/5/2023   Next office visit : 8/7/2023     Requested Prescriptions     Pending Prescriptions Disp Refills    gabapentin (NEURONTIN) 300 MG capsule 270 capsule 0     Sig: Take 1 capsule by mouth 3 times daily for 89 days.     Dulaglutide (TRULICITY) 2.88 QX/2.3LK SOPN 4 Adjustable Dose Pre-filled Pen Syringe 2     Sig: Inject 0.75 mg into the skin once a week            [facility]

## 2023-07-15 DIAGNOSIS — K52.9 GASTROENTERITIS: ICD-10-CM

## 2023-07-15 DIAGNOSIS — K21.9 GASTROESOPHAGEAL REFLUX DISEASE WITHOUT ESOPHAGITIS: ICD-10-CM

## 2023-07-17 RX ORDER — SUCRALFATE 1 G/1
1 TABLET ORAL 4 TIMES DAILY
Qty: 360 TABLET | Refills: 0 | OUTPATIENT
Start: 2023-07-17

## 2023-07-31 LAB — INR BLD: 2.6

## 2023-07-31 RX ORDER — TIOTROPIUM BROMIDE INHALATION SPRAY 3.12 UG/1
SPRAY, METERED RESPIRATORY (INHALATION)
Qty: 8 G | Refills: 10 | Status: SHIPPED | OUTPATIENT
Start: 2023-07-31

## 2023-08-01 ENCOUNTER — ANTI-COAG VISIT (OUTPATIENT)
Dept: PRIMARY CARE CLINIC | Age: 73
End: 2023-08-01
Payer: MEDICARE

## 2023-08-01 DIAGNOSIS — Z79.01 ANTICOAGULATED ON COUMADIN: Primary | ICD-10-CM

## 2023-08-01 PROCEDURE — 93793 ANTICOAG MGMT PT WARFARIN: CPT | Performed by: INTERNAL MEDICINE

## 2023-08-01 NOTE — PROGRESS NOTES
HOME MONITORING REPORT    INR today:   Results for orders placed or performed in visit on 08/01/23   Protime-INR   Result Value Ref Range    INR 2.60        INR Goal: 2.0-3.0    Dosing Plan  As of 8/1/2023      TTR:  31.9 % (5.2 y)   Full warfarin instructions:  11.25 mg every Sun; 7.5 mg all other days                PLAN: Advised patient/caregiver to continue current dose and recheck in one week.   Patient/Caregiver voiced understanding    Electronically signed by Louis Oconnell MD on 8/1/2023 at 9:52 AM

## 2023-08-02 RX ORDER — BACLOFEN 10 MG/1
TABLET ORAL
Qty: 90 TABLET | Refills: 1 | Status: SHIPPED | OUTPATIENT
Start: 2023-08-02

## 2023-08-09 ENCOUNTER — HOSPITAL ENCOUNTER (OUTPATIENT)
Dept: GENERAL RADIOLOGY | Age: 73
Discharge: HOME | End: 2023-08-09
Payer: MEDICARE

## 2023-08-09 ENCOUNTER — OFFICE VISIT (OUTPATIENT)
Dept: INTERNAL MEDICINE | Age: 73
End: 2023-08-09
Payer: MEDICARE

## 2023-08-09 VITALS
HEART RATE: 80 BPM | WEIGHT: 243.6 LBS | HEIGHT: 67 IN | BODY MASS INDEX: 38.24 KG/M2 | OXYGEN SATURATION: 99 % | DIASTOLIC BLOOD PRESSURE: 88 MMHG | SYSTOLIC BLOOD PRESSURE: 134 MMHG

## 2023-08-09 DIAGNOSIS — F41.9 ANXIETY AND DEPRESSION: ICD-10-CM

## 2023-08-09 DIAGNOSIS — M25.551 PAIN OF RIGHT HIP: Primary | ICD-10-CM

## 2023-08-09 DIAGNOSIS — I10 PRIMARY HYPERTENSION: ICD-10-CM

## 2023-08-09 DIAGNOSIS — M54.40 ACUTE RIGHT-SIDED LOW BACK PAIN WITH SCIATICA, SCIATICA LATERALITY UNSPECIFIED: ICD-10-CM

## 2023-08-09 DIAGNOSIS — E53.8 B12 DEFICIENCY: ICD-10-CM

## 2023-08-09 DIAGNOSIS — E11.9 TYPE 2 DIABETES MELLITUS WITHOUT COMPLICATION, UNSPECIFIED WHETHER LONG TERM INSULIN USE (HCC): ICD-10-CM

## 2023-08-09 DIAGNOSIS — M25.551 PAIN OF RIGHT HIP: ICD-10-CM

## 2023-08-09 DIAGNOSIS — F32.A ANXIETY AND DEPRESSION: ICD-10-CM

## 2023-08-09 DIAGNOSIS — G62.9 PERIPHERAL POLYNEUROPATHY: ICD-10-CM

## 2023-08-09 PROCEDURE — G8417 CALC BMI ABV UP PARAM F/U: HCPCS | Performed by: INTERNAL MEDICINE

## 2023-08-09 PROCEDURE — 3017F COLORECTAL CA SCREEN DOC REV: CPT | Performed by: INTERNAL MEDICINE

## 2023-08-09 PROCEDURE — 1036F TOBACCO NON-USER: CPT | Performed by: INTERNAL MEDICINE

## 2023-08-09 PROCEDURE — G8427 DOCREV CUR MEDS BY ELIG CLIN: HCPCS | Performed by: INTERNAL MEDICINE

## 2023-08-09 PROCEDURE — 99214 OFFICE O/P EST MOD 30 MIN: CPT | Performed by: INTERNAL MEDICINE

## 2023-08-09 PROCEDURE — 3044F HG A1C LEVEL LT 7.0%: CPT | Performed by: INTERNAL MEDICINE

## 2023-08-09 PROCEDURE — G8399 PT W/DXA RESULTS DOCUMENT: HCPCS | Performed by: INTERNAL MEDICINE

## 2023-08-09 PROCEDURE — 72110 X-RAY EXAM L-2 SPINE 4/>VWS: CPT

## 2023-08-09 PROCEDURE — 96372 THER/PROPH/DIAG INJ SC/IM: CPT | Performed by: INTERNAL MEDICINE

## 2023-08-09 PROCEDURE — 3078F DIAST BP <80 MM HG: CPT | Performed by: INTERNAL MEDICINE

## 2023-08-09 PROCEDURE — 2022F DILAT RTA XM EVC RTNOPTHY: CPT | Performed by: INTERNAL MEDICINE

## 2023-08-09 PROCEDURE — 3074F SYST BP LT 130 MM HG: CPT | Performed by: INTERNAL MEDICINE

## 2023-08-09 PROCEDURE — 73502 X-RAY EXAM HIP UNI 2-3 VIEWS: CPT

## 2023-08-09 PROCEDURE — 1123F ACP DISCUSS/DSCN MKR DOCD: CPT | Performed by: INTERNAL MEDICINE

## 2023-08-09 PROCEDURE — 1090F PRES/ABSN URINE INCON ASSESS: CPT | Performed by: INTERNAL MEDICINE

## 2023-08-09 RX ORDER — CYANOCOBALAMIN 1000 UG/ML
1000 INJECTION, SOLUTION INTRAMUSCULAR; SUBCUTANEOUS ONCE
Status: COMPLETED | OUTPATIENT
Start: 2023-08-09 | End: 2023-08-09

## 2023-08-09 RX ORDER — PREDNISONE 1 MG/1
1 TABLET ORAL 2 TIMES DAILY
Qty: 10 TABLET | Refills: 0 | Status: SHIPPED | OUTPATIENT
Start: 2023-08-09 | End: 2023-08-14

## 2023-08-09 RX ADMIN — CYANOCOBALAMIN 1000 MCG: 1000 INJECTION, SOLUTION INTRAMUSCULAR; SUBCUTANEOUS at 10:46

## 2023-08-09 NOTE — PROGRESS NOTES
Intermittent claudication (HCC)     Intestinal obstruction (HCC)     Iron deficiency     Left-sided weakness     Low vitamin D level     Lupus (HCC)     Menopause     Obesity     Osteoarthritis     Osteoporosis     Other iron deficiency anemias     Palliative care patient 09/24/2020    Pernicious anemia     PONV (postoperative nausea and vomiting)     PUPP (pruritic urticarial papules and plaques of pregnancy)     Restless legs syndrome (RLS) 7/11/2019    Right leg numbness     Right sided sciatica     Sarcoidosis     with liver involvement    Sciatica     Secondary hyperparathyroidism ([address])     Shingles     Shortness of breath     Sleep apnea     Stomach ulcer     Syncope     Visual loss, one eye       Past Surgical History:   Procedure Laterality Date    APPENDECTOMY      CARDIAC CATHETERIZATION  10/21/13  [facility]    EF over 60%    CHOLECYSTECTOMY      COLONOSCOPY  02/2010    negative    COLONOSCOPY  02/22/2010    Dr Rosalinda Foote    COLONOSCOPY  04/01/2016    Dr LAKHWINDER Horvath-internal hemorrhoids, 5 yr recall    COLONOSCOPY N/A 05/07/2021    Dr Young Face looping/tortuosity throughout the left colon, BE=Normal    DILATATION, ESOPHAGUS      EYE SURGERY      Cyst on Right eye    EYE SURGERY      GASTRIC BYPASS SURGERY      GASTRIC BYPASS SURGERY      HERNIA REPAIR      HYSTERECTOMY (CERVIX STATUS UNKNOWN)  1974    Complete    INCONTINENCE SURGERY      Bladder Sling    INFECTED SKIN DEBRIDEMENT Right     dog bite right forearm    OTHER SURGICAL HISTORY      IVC filter    PACEMAKER INSERTION      PACEMAKER PLACEMENT      medtronic    WI ARTHRP KNE CONDYLE&PLATU MEDIAL&LAT COMPARTMENTS Right 03/26/2018    TOTAL KNEE REPLACEMENT COMPLEX PRIMARY performed by Monica Tellez MD at [address]      KRISTEL AND BSO (CERVIX REMOVED) Bilateral 1974    age 21    TONSILLECTOMY AND ADENOIDECTOMY      TOTAL KNEE ARTHROPLASTY Left 07/19/2022    UPPER GASTROINTESTINAL

## 2023-08-10 ENCOUNTER — ANTI-COAG VISIT (OUTPATIENT)
Dept: PRIMARY CARE CLINIC | Age: 73
End: 2023-08-10
Payer: MEDICARE

## 2023-08-10 DIAGNOSIS — Z79.01 ANTICOAGULATED ON COUMADIN: Primary | ICD-10-CM

## 2023-08-10 LAB — INR BLD: 4.5

## 2023-08-10 PROCEDURE — 93793 ANTICOAG MGMT PT WARFARIN: CPT | Performed by: INTERNAL MEDICINE

## 2023-08-10 NOTE — PROGRESS NOTES
HOME MONITORING REPORT    INR today:   Results for orders placed or performed in visit on 08/10/23   Protime-INR   Result Value Ref Range    INR 4.50        INR Goal: 2.0-3.0    Dosing Plan  As of 8/10/2023      TTR:  31.8 % (5.2 y)   Full warfarin instructions:  8/10: Hold; 8/11: 3.75 mg; 8/13: 7.5 mg; Otherwise 11.25 mg every Sun; 7.5 mg all other days                PLAN: Advised patient/caregiver to hold her dose tonight. Reduce dose tomorrow to 3.75 mg. Reduce dose on Sunday to 7.5 mg and recheck in one week.   Patient/Caregiver voiced understanding      Electronically signed by Dmitriy Pizarro MD on 8/10/2023 at 11:53 AM

## 2023-08-11 SDOH — ECONOMIC STABILITY: INCOME INSECURITY: HOW HARD IS IT FOR YOU TO PAY FOR THE VERY BASICS LIKE FOOD, HOUSING, MEDICAL CARE, AND HEATING?: SOMEWHAT HARD

## 2023-08-11 SDOH — ECONOMIC STABILITY: FOOD INSECURITY: WITHIN THE PAST 12 MONTHS, YOU WORRIED THAT YOUR FOOD WOULD RUN OUT BEFORE YOU GOT MONEY TO BUY MORE.: NEVER TRUE

## 2023-08-11 SDOH — ECONOMIC STABILITY: FOOD INSECURITY: WITHIN THE PAST 12 MONTHS, THE FOOD YOU BOUGHT JUST DIDN'T LAST AND YOU DIDN'T HAVE MONEY TO GET MORE.: NEVER TRUE

## 2023-08-11 ASSESSMENT — PATIENT HEALTH QUESTIONNAIRE - PHQ9
8. MOVING OR SPEAKING SO SLOWLY THAT OTHER PEOPLE COULD HAVE NOTICED. OR THE OPPOSITE, BEING SO FIGETY OR RESTLESS THAT YOU HAVE BEEN MOVING AROUND A LOT MORE THAN USUAL: 0
SUM OF ALL RESPONSES TO PHQ QUESTIONS 1-9: 2
6. FEELING BAD ABOUT YOURSELF - OR THAT YOU ARE A FAILURE OR HAVE LET YOURSELF OR YOUR FAMILY DOWN: 0
2. FEELING DOWN, DEPRESSED OR HOPELESS: 1
5. POOR APPETITE OR OVEREATING: 0
4. FEELING TIRED OR HAVING LITTLE ENERGY: 0
SUM OF ALL RESPONSES TO PHQ QUESTIONS 1-9: 2
SUM OF ALL RESPONSES TO PHQ QUESTIONS 1-9: 2
3. TROUBLE FALLING OR STAYING ASLEEP: 0
SUM OF ALL RESPONSES TO PHQ9 QUESTIONS 1 & 2: 2
9. THOUGHTS THAT YOU WOULD BE BETTER OFF DEAD, OR OF HURTING YOURSELF: 0
1. LITTLE INTEREST OR PLEASURE IN DOING THINGS: 1
7. TROUBLE CONCENTRATING ON THINGS, SUCH AS READING THE NEWSPAPER OR WATCHING TELEVISION: 0
10. IF YOU CHECKED OFF ANY PROBLEMS, HOW DIFFICULT HAVE THESE PROBLEMS MADE IT FOR YOU TO DO YOUR WORK, TAKE CARE OF THINGS AT HOME, OR GET ALONG WITH OTHER PEOPLE: 0
SUM OF ALL RESPONSES TO PHQ QUESTIONS 1-9: 2

## 2023-08-11 ASSESSMENT — ENCOUNTER SYMPTOMS
BACK PAIN: 1
ABDOMINAL PAIN: 0
SINUS PRESSURE: 0
SORE THROAT: 0
SINUS PAIN: 0
BLOOD IN STOOL: 0
CONSTIPATION: 0
ABDOMINAL DISTENTION: 0
EYE DISCHARGE: 0
SHORTNESS OF BREATH: 0
COLOR CHANGE: 0
VOICE CHANGE: 0
STRIDOR: 0
COUGH: 0
NAUSEA: 0
TROUBLE SWALLOWING: 0
APNEA: 0
VOMITING: 0
WHEEZING: 0
RHINORRHEA: 0
DIARRHEA: 0
EYE ITCHING: 0
CHEST TIGHTNESS: 0

## 2023-08-15 DIAGNOSIS — D50.9 IRON DEFICIENCY ANEMIA, UNSPECIFIED IRON DEFICIENCY ANEMIA TYPE: ICD-10-CM

## 2023-08-16 ENCOUNTER — ANTI-COAG VISIT (OUTPATIENT)
Dept: PRIMARY CARE CLINIC | Age: 73
End: 2023-08-16
Payer: MEDICARE

## 2023-08-16 DIAGNOSIS — Z79.01 ANTICOAGULATED ON COUMADIN: Primary | ICD-10-CM

## 2023-08-16 LAB — INR BLD: 5.3

## 2023-08-16 PROCEDURE — 93793 ANTICOAG MGMT PT WARFARIN: CPT | Performed by: INTERNAL MEDICINE

## 2023-08-16 RX ORDER — QUINIDINE GLUCONATE 324 MG
TABLET, EXTENDED RELEASE ORAL
Qty: 60 TABLET | Refills: 10 | Status: SHIPPED | OUTPATIENT
Start: 2023-08-16

## 2023-08-16 NOTE — PROGRESS NOTES
HOME MONITORING REPORT    INR today:   Results for orders placed or performed in visit on 08/16/23   Protime-INR   Result Value Ref Range    INR 5.30        INR Goal: 2.0-3.0    Dosing Plan  As of 8/16/2023      TTR:  31.7 % (5.3 y)   Full warfarin instructions:  8/16: Hold; 8/17: Hold; 8/18: 3.75 mg; 8/20: 3.75 mg; Otherwise 11.25 mg every Sun; 7.5 mg all other days                PLAN: Advised patient/caregiver to hold her dose today and tomorrow. Reduce dose on Friday and Sunday to 3.75 mg. 7.5 mg the other three days. Recheck in one week.   Patient/Caregiver voiced understanding      Electronically signed by Jenn Simpson MD on 8/16/2023 at 11:53 AM

## 2023-08-24 ENCOUNTER — ANTI-COAG VISIT (OUTPATIENT)
Dept: PRIMARY CARE CLINIC | Age: 73
End: 2023-08-24
Payer: MEDICARE

## 2023-08-24 DIAGNOSIS — Z79.01 ANTICOAGULATED ON COUMADIN: Primary | ICD-10-CM

## 2023-08-24 LAB — INR BLD: 1.8

## 2023-08-24 PROCEDURE — 93793 ANTICOAG MGMT PT WARFARIN: CPT | Performed by: INTERNAL MEDICINE

## 2023-08-24 NOTE — PROGRESS NOTES
HOME MONITORING REPORT    INR today:   Results for orders placed or performed in visit on 08/24/23   Protime-INR   Result Value Ref Range    INR 1.80        INR Goal: 2.0-3.0    Dosing Plan  As of 8/24/2023      TTR:  31.6 % (5.3 y)   Full warfarin instructions:  11.25 mg every Sun; 7.5 mg all other days                PLAN: Advised patient/caregiver to resume her regular dose of 11.25 on Sunday, 7.5 mg all other days and recheck in one week.   Patient/Caregiver voiced understanding    Electronically signed by Troy Colon MD on 8/24/2023 at 11:26 AM

## 2023-08-29 ENCOUNTER — TELEPHONE (OUTPATIENT)
Dept: NEUROLOGY | Age: 73
End: 2023-08-29

## 2023-08-30 ENCOUNTER — CLINICAL SUPPORT NO REQUIREMENTS (OUTPATIENT)
Dept: CARDIOLOGY | Facility: CLINIC | Age: 73
End: 2023-08-30
Payer: MEDICARE

## 2023-08-30 ENCOUNTER — OFFICE VISIT (OUTPATIENT)
Dept: CARDIOLOGY | Facility: CLINIC | Age: 73
End: 2023-08-30
Payer: MEDICARE

## 2023-08-30 VITALS
DIASTOLIC BLOOD PRESSURE: 102 MMHG | HEIGHT: 67 IN | OXYGEN SATURATION: 96 % | HEART RATE: 63 BPM | SYSTOLIC BLOOD PRESSURE: 158 MMHG | WEIGHT: 244 LBS | BODY MASS INDEX: 38.3 KG/M2

## 2023-08-30 DIAGNOSIS — I10 ESSENTIAL HYPERTENSION: ICD-10-CM

## 2023-08-30 DIAGNOSIS — I47.1 PAROXYSMAL SVT (SUPRAVENTRICULAR TACHYCARDIA): ICD-10-CM

## 2023-08-30 DIAGNOSIS — I49.5 SSS (SICK SINUS SYNDROME): ICD-10-CM

## 2023-08-30 DIAGNOSIS — I48.0 PAROXYSMAL ATRIAL FIBRILLATION: ICD-10-CM

## 2023-08-30 DIAGNOSIS — I49.5 SSS (SICK SINUS SYNDROME): Primary | ICD-10-CM

## 2023-08-30 DIAGNOSIS — Z95.0 PACEMAKER: Primary | ICD-10-CM

## 2023-08-30 DIAGNOSIS — Z95.0 PACEMAKER: ICD-10-CM

## 2023-08-30 DIAGNOSIS — E66.01 CLASS 2 SEVERE OBESITY DUE TO EXCESS CALORIES WITH SERIOUS COMORBIDITY AND BODY MASS INDEX (BMI) OF 36.0 TO 36.9 IN ADULT: ICD-10-CM

## 2023-08-30 RX ORDER — SODIUM BICARBONATE 650 MG/1
1 TABLET ORAL EVERY 12 HOURS SCHEDULED
COMMUNITY
Start: 2023-08-15

## 2023-08-30 RX ORDER — LISINOPRIL 10 MG/1
10 TABLET ORAL DAILY
Qty: 30 TABLET | Refills: 11 | Status: SHIPPED | OUTPATIENT
Start: 2023-08-30

## 2023-08-30 RX ORDER — DULAGLUTIDE 0.75 MG/.5ML
0.75 INJECTION, SOLUTION SUBCUTANEOUS
COMMUNITY
Start: 2023-07-14

## 2023-08-30 NOTE — PROGRESS NOTES
"    Subjective:     Encounter Date:08/30/2023      Patient ID: Veronica Stewart is a 73 y.o. female     Chief Complaint:\"no complaints\"  Heart Problem  This is a chronic problem. The current episode started more than 1 year ago. The problem occurs daily. The problem has been rapidly improving. Pertinent negatives include no chest pain, coughing, rash or weakness.   Atrial Fibrillation  Presents for follow-up visit. Symptoms include dizziness and palpitations. Symptoms are negative for chest pain, shortness of breath, syncope and weakness. The symptoms have been stable. Past medical history includes atrial fibrillation.     Patient presents today for a routine follow up. Patient is followed for SSS s/p dual chamber pacemaker implant in 2009. She has had episodes of PAF and SVT noted on previous device interrogations. She remains anticoagulated with Coumadin which is managed per her PCP. Her last pacemaker interrogation was in February and revealed several episodes of SVT.     Today she reports she has been well. She notes her BP has been elevated the last week or two at home and at doctors appointments. She is not taking her lisinopril or metoprolol. Her lisinopril was previously placed on \"hold\" following a knee surgery last year due to hypotension with SBP in the 80s. She notes associated fatigue with elevated BP and denies headaches and chest pain with elevated readings. She notes her RIDDLE is unchanged. She has chronic BLE edema and takes Bumex daily. She does miss a dose on occasion and denies a change in her edema with missed doses.  She notes occasional palpitations. She also experiences dizziness daily that is not provoked with position change.     The following portions of the patient's history were reviewed and updated as appropriate: allergies, current medications, past family history, past medical history, past social history, past surgical history and problem list.    Allergies   Allergen Reactions    Bee Venom " Anaphylaxis    Insect Extract Anaphylaxis     Bee stings    Percocet [Oxycodone-Acetaminophen] GI Intolerance     Sweating and vomiting     Prednisone Other (See Comments)     Passed out and lost all bodily functions    Tizanidine Hcl Diarrhea and Nausea And Vomiting     Passed out lost control of body functions      Ultram [Tramadol Hcl] GI Intolerance     Sweating and vomiting     Morphine Other (See Comments)    Hydrocodone-Acetaminophen Nausea And Vomiting     Sweats, weak, nausea and vomiting    Other GI Intolerance     Opioids-[facility], patient started sweating and vomiting         Current Outpatient Medications:     ARIPiprazole (ABILIFY) 5 MG tablet, Take 1 tablet by mouth Daily., Disp: , Rfl:     baclofen (LIORESAL) 10 MG tablet, Take 1 tablet by mouth As Needed., Disp: , Rfl:     Blood Glucose Calibration (True Metrix Level 1) Low solution, , Disp: , Rfl:     bumetanide (BUMEX) 0.5 MG tablet, Take 1 tablet by mouth Daily., Disp: , Rfl:     calcitriol (ROCALTROL) 0.25 MCG capsule, Take 1 capsule by mouth Daily., Disp: , Rfl:     CloNIDine (CATAPRES) 0.1 MG tablet, Take 1 tablet by mouth 2 (Two) Times a Day As Needed for High Blood Pressure (for top bp >180 or bottom BP >110)., Disp: , Rfl:     cyanocobalamin 1000 MCG/ML injection, Inject 1 mL into the appropriate muscle as directed by prescriber Every 28 (Twenty-Eight) Days., Disp: , Rfl:     escitalopram (LEXAPRO) 20 MG tablet, Take 1 tablet by mouth Daily., Disp: 30 tablet, Rfl: 2    ferrous gluconate (FERGON) 240 (27 FE) MG tablet, Take 1 tablet by mouth 2 (Two) Times a Day., Disp: , Rfl:     fexofenadine (ALLEGRA) 180 MG tablet, Take 1 tablet by mouth Daily., Disp: , Rfl:     gabapentin (NEURONTIN) 300 MG capsule, , Disp: , Rfl:     levothyroxine (SYNTHROID, LEVOTHROID) 75 MCG tablet, Take 1 tablet by mouth Daily., Disp: , Rfl:     levothyroxine (SYNTHROID, LEVOTHROID) 75 MCG tablet, Take 1 tablet by mouth Daily., Disp: , Rfl:     Multiple  Vitamins-Minerals (MULTIVITAMIN WITH MINERALS) tablet tablet, Take 1 tablet by mouth Daily., Disp: , Rfl:     ondansetron (ZOFRAN) 4 MG tablet, Take 1 tablet by mouth Daily As Needed for Nausea or Vomiting., Disp: , Rfl:     pantoprazole (PROTONIX) 40 MG EC tablet, Take 1 tablet by mouth 2 (Two) Times a Day Before Meals., Disp: , Rfl:     potassium chloride 10 MEQ CR tablet, Take 1 tablet by mouth Daily. Currently on hold, Disp: , Rfl:     pregabalin (LYRICA) 75 MG capsule, Take 1 capsule by mouth 3 (Three) Times a Day., Disp: , Rfl:     rosuvastatin (CRESTOR) 5 MG tablet, Take 1 tablet by mouth Daily., Disp: , Rfl:     Semaglutide,0.25 or 0.5MG/DOS, (OZEMPIC) 2 MG/1.5ML solution pen-injector, Inject 0.25 mg under the skin into the appropriate area as directed., Disp: , Rfl:     sodium bicarbonate 650 MG tablet, Take 1 tablet by mouth Every 12 (Twelve) Hours., Disp: , Rfl:     sucralfate (CARAFATE) 1 g tablet, Take 1 tablet by mouth 4 (Four) Times a Day., Disp: , Rfl:     tiotropium (SPIRIVA HANDIHALER) 18 MCG per inhalation capsule, Place 1 capsule into inhaler and inhale Daily., Disp: , Rfl:     Tirzepatide (Mounjaro) 2.5 MG/0.5ML solution pen-injector, Inject 0.5 mL under the skin into the appropriate area as directed., Disp: , Rfl:     Trulicity 0.75 MG/0.5ML solution pen-injector, Inject 0.75 mg under the skin into the appropriate area as directed., Disp: , Rfl:     warfarin (COUMADIN) 7.5 MG tablet, Take 1 tablet by mouth Daily. Alternating 7.5mg with 15mg, Disp: , Rfl:     lisinopril (PRINIVIL,ZESTRIL) 10 MG tablet, Take 1 tablet by mouth Daily., Disp: 30 tablet, Rfl: 11    montelukast (SINGULAIR) 10 MG tablet, Take 1 tablet by mouth Daily., Disp: , Rfl:   Past Medical History:   Diagnosis Date    Anxiety     Arrhythmia     Blood clot in vein 05/2018    Hospitalized     Arben-tachy syndrome     Bradycardia     Breath shortness     Burn     left leg    Cardiac pacemaker     11/09 MED [facility]    Chest pain      Chronic fatigue     Depression     Diabetes mellitus     Difficulty walking 5/17    Dizziness     Fatigue     Fibromyalgia, primary 7/15    Follow-up exam     Headache, tension-type 9/2018    Heart burn     Hypercoagulable state, primary     LUPUS ANTI-COAG    Hyperlipidemia     Hypertension     Liver disease     Neuropathy in diabetes 03/14    Paroxysmal atrial fibrillation 8/29/2022    Peripheral neuropathy 03/03    Shingles 9/2018    Shortness of breath     Sleep apnea     complex.  using a c-pap machine    Stroke     Syncope     Tachy-redd syndrome     TIA (transient ischemic attack) 04/2003    Vision loss in jr. high school    lBlind rt. eye       Social History     Socioeconomic History    Marital status:    Tobacco Use    Smoking status: Never     Passive exposure: Never    Smokeless tobacco: Never   Vaping Use    Vaping Use: Never used   Substance and Sexual Activity    Alcohol use: No    Drug use: No    Sexual activity: Not Currently     Partners: Male     Birth control/protection: Condom, Surgical, Abstinence, Hysterectomy       Review of Systems   Constitutional: Positive for malaise/fatigue. Negative for weight gain and weight loss.   Cardiovascular:  Positive for dyspnea on exertion, leg swelling and palpitations. Negative for chest pain, irregular heartbeat, near-syncope, orthopnea, paroxysmal nocturnal dyspnea and syncope.   Respiratory:  Negative for cough, shortness of breath, sleep disturbances due to breathing, sputum production and wheezing.    Hematologic/Lymphatic: Does not bruise/bleed easily.   Skin:  Negative for dry skin, flushing, itching and rash.   Gastrointestinal:  Negative for hematemesis and hematochezia.   Neurological:  Positive for dizziness. Negative for light-headedness, loss of balance and weakness.   All other systems reviewed and are negative.       Objective:     Vitals reviewed.   Constitutional:       General: Not in acute distress.     Appearance: Healthy  appearance. Well-developed. Morbidly obese. Not diaphoretic.   Eyes:      General: No scleral icterus.     Conjunctiva/sclera: Conjunctivae normal.      Pupils: Pupils are equal, round, and reactive to light.   HENT:      Head: Normocephalic.    Mouth/Throat:      Pharynx: No oropharyngeal exudate.   Neck:      Vascular: No JVR.   Pulmonary:      Effort: Pulmonary effort is normal. No respiratory distress.      Breath sounds: Normal breath sounds. No wheezing. No rhonchi. No rales.   Chest:      Chest wall: Not tender to palpatation.   Cardiovascular:      Normal rate. Regular rhythm.   Pulses:     Intact distal pulses.   Edema:     Peripheral edema present.     Pretibial: bilateral 2+ pitting edema of the pretibial area.     Ankle: bilateral 2+ pitting edema of the ankle.     Feet: bilateral 2+ pitting edema of the feet.  Abdominal:      General: Bowel sounds are normal. There is no distension.      Palpations: Abdomen is soft.      Tenderness: There is no abdominal tenderness.   Musculoskeletal: Normal range of motion.      Cervical back: Normal range of motion and neck supple. Skin:     General: Skin is warm and dry.      Coloration: Skin is not pale.      Findings: No erythema or rash.   Neurological:      Mental Status: Alert, oriented to person, place, and time and oriented to person, place and time.      Deep Tendon Reflexes: Reflexes are normal and symmetric.   Psychiatric:         Behavior: Behavior normal.           ECG 12 Lead    Date/Time: 8/30/2023 9:07 AM  Performed by: Marie Mendez APRN  Authorized by: Marie Mendez APRN   Comparison: compared with previous ECG from 8/29/2022  Similar to previous ECG  Rhythm: paced  Rate: normal  BPM: 63  Conduction: conduction normal  Q waves: aVL and I    ST Segments: ST segments normal  T inversion: III, aVF, V6 and V5  QRS axis: left  Other findings: T wave abnormality  Pacing capture: atrial paced.  Clinical impression: abnormal EKG      BP (!) 158/102    "Pulse 63   Ht 170.2 cm (67\")   Wt 111 kg (244 lb)   SpO2 96%   BMI 38.22 kg/mý     Lab Review:   I have reviewed previous office notes, device interrogations, recent labs and recent cardiac testing.     Lab Results   Component Value Date    CHLPL 203 (H) 04/28/2023    TRIG 80 04/28/2023     04/28/2023    LDL 82 04/28/2023     Results for orders placed during the hospital encounter of 09/27/21    Adult Transthoracic Echo Complete w/ Color, Spectral and Contrast if necessary per protocol    Interpretation Summary  ú Left ventricular wall thickness is consistent with mild concentric hypertrophy.  ú Estimated left ventricular EF = 55% Left ventricular systolic function is normal.  ú Left ventricular diastolic function is consistent with (grade I) impaired relaxation and age.  ú The right atrial cavity is mild to moderately dilated.  ú Moderate tricuspid valve regurgitation is present.  ú Estimated right ventricular systolic pressure from tricuspid regurgitation is moderately elevated (45-55 mmHg).  ú Moderate pulmonary hypertension is present.          Assessment:          Diagnosis Plan   1. SSS (sick sinus syndrome)        2. Pacemaker        3. Paroxysmal atrial fibrillation        4. Paroxysmal SVT (supraventricular tachycardia)        5. Essential hypertension        6. Class 2 severe obesity due to excess calories with serious comorbidity and body mass index (BMI) of 36.0 to 36.9 in adult               Plan:       1. SSS- stable. S/p pacemaker.   2. Pacemaker- normal device function per interrogation in Feb with episodes of SVT. Her device is set to transmit manually and has not transmitted in a long time. Will interrogate today. Spoke with PALOMA Coon and she will be contacting the patient every 90 days to remind her to send a transmission.   3. PAF- previous episodes of PAF with RVR per device interrogations. Was started on Metoprolol and developed lightheadedness and near syncope, therefore it was " stopped. Anticoagulated with Coumadin with last INR subtherapeutic at 1.8. INR followed by PCP.    4. PSVT- noted on previous interrogations. Unable to tolerate BB. Palpitations occur occasionally. If palpitations become more frequent, would recommend a holter monitor for rhythm/symptom correlation.   5. HTN- uncontrolled. Previously on lisinopril which was stopped due to symptomatic hypotension last year. Will restart at a lower dose of 10mg daily. Will call in 1 week to follow up on BP readings.   6. BMI- Class 2 Severe Obesity (BMI >=35 and <=39.9). Obesity-related health conditions include the following: hypertension, dyslipidemias and lower extremity venous stasis disease. Obesity is unchanged. BMI is is above average; BMI management plan is completed. We discussed portion control and increasing exercise.      Follow up in 1 year or sooner if symptoms worsen.     I spent 30 minutes caring for Veronica on this date of service. This time includes time spent by me in the following activities:preparing for the visit, reviewing tests, obtaining and/or reviewing a separately obtained history, performing a medically appropriate examination and/or evaluation , counseling and educating the patient/family/caregiver, ordering medications, tests, or procedures, documenting information in the medical record, independently interpreting results and communicating that information with the patient/family/caregiver and care coordination    I spent 2 minutes on the separately reported service of EKG interpretation. This time is not included in the time used to support the E/M service also reported today.    Advance Care Planning   ACP discussion was held with the patient during this visit. Patient does not have an advance directive, declines further assistance.

## 2023-08-30 NOTE — PROGRESS NOTES
Dual Chamber Pacemaker Interrogation Report  IN OFFICE    February 27, 2023    Primary Cardiologist: Mynor  : [employer] Model: Mesha MARTINEZ [id number]  Implant date: 2.26.2020    Reason for evaluation: Routine  Indication for pacemaker: Sick Sinus Syndrome    Measurements  Atrial sensing - P wave: 1.6 mV  Atrial threshold: 0.5V@ 0.4ms  Atrial lead impedance: 437 ohms  Ventricular sensing - R wave: 4 mV  Ventricular threshold: 1 V @ 0.4 ms  Ventricular lead impedance:   456 ohms     Manual sensing and threshold testing performed:  Yes    Diagnostic Data  Atrial paced: 91 %  Ventricular paced: 0.5 %    Episodes/Alerts since 8/29/2022:  13 SVT episodes, longest duration 6 seconds, rates 164-194 bpm.      Battery status: Satisfactory , 3.01V, estimated 6 years (4.5-7.5 years remaining)      Final Parameters  Mode:  AAIR <=> DDDR  Lower rate: 60 bpm   Upper rate: 130 bpm  AV Delay: paced- 180 ms  Sensed-150 ms  Atrial - Amplitude: 1.5 V   Pulse width: 0.4 ms   Sensitivity: 0.3 mV     Ventricular - Amplitude: 3.25 V  Pulse width: 0.4 ms  Sensitivity: 0.9 mV      Changes made: No changes.    Conclusions: Normal Pacemaker Function, Stable Pacing and Sensing Thresholds, and Adequate Battery Reserve    Remote Monitor:  Yes, requires manual transmissions.    Follow up: Every 3 months via [employer], annually in office.

## 2023-08-30 NOTE — PROGRESS NOTES
Written/documented by Salome Mcnair, acting as a scribe in Dr. De Los Santos's presence.    Subjective   Patient ID: Dann is a 9 year old female who is accompanied by dad    Well Child 9-11 Year    HPI  Dann Marino is a 9 year old female who presents today for a wellness visit.  She is doing well.  Eating well.  No HA  No stomach aches.  She is staying with her grandmother to help her during Covid.  School is remote learning.    Review of Systems   All other systems reviewed and are negative.    Current Outpatient Medications   Medication Sig Dispense Refill   • albuterol (VENTOLIN) (2.5 MG/3ML) 0.083% nebulizer solution One vial 3 times a day till cough is gone       No current facility-administered medications for this visit.      ALLERGIES:   Allergen Reactions   • Amoxicillin RASH   • Tobramycin SWELLING     Social history: attends school     Objective   Vitals: Blood pressure 116/68, pulse 80, temperature 98 °F (36.7 °C), temperature source Temporal, resp. rate 20, height 4' 7.5\" (1.41 m), weight 41.1 kg (90 lb 9.6 oz).      Growth parameters are noted and are appropriate for age.    Physical Exam    GENERAL: Evaluation of appearance.  Findings:  Alert- not distressed    HEAD & SCALP: Evaluation for lesions, swelling, tenderness and abnormalities.  Findings: Normal - normocephalic with no lesions present    EYES: Evaluation for redness, swelling, drainage and abnormalities.  Findings: Both eyes normal - red reflex present, no redness or drainage, NIMCO    EARS: Evaluation of  external ears, canals, and tm's  Findings: Normal bilateral ext. ears, canals, and tm's    NOSE: Evaluation for swelling and drainage  Findings: Normal - patent with no swelling or discharge present    MOUTH: Evaluation of tongue and mucosal membranes  Findings: Normal    THROAT: Evaluation for redness and lesions  Findings: Normal - no redness no lesions    NECK: Evaluation for masses, swelling and soreness  Findings: Supple, no adenopathy  Dual Chamber Pacemaker Interrogation Report  IN OFFICE    August 30, 2023    Primary Cardiologist: Mynor  : [employer] Model: Advisa  Implant date: 02/26/2020    Reason for evaluation: Routine  Indication for pacemaker: Sick Sinus Syndrome    Interrogation performed by Clarence Wills MA    Measurements  Atrial sensing - P wave: 2 mV  Atrial threshold: 0.5V@ 0.4ms  Atrial lead impedance: 437 ohms  Ventricular sensing - R wave: 4.3 mV  Ventricular threshold: 1.75 V @ 0.4 ms  Ventricular lead impedance:   456 ohms     Manual sensing and threshold testing performed:  Yes    Diagnostic Data  Atrial paced: 89.6 %  Ventricular paced: <0.1 %    Episodes/Alerts since 2/27/23: SVT x 20 ,longest duration 9 seconds, rates up to 205 bpm.  NS-VT x 3, longest 1-2 seconds, rates 167-182 bpm.  CATRACHITO Sorenson, notified.    Battery status: Satisfactory , 6 years (4.5-7.5 years)      Final Parameters  Mode:  AAIR <=> DDDR  Lower rate: 60 bpm   Upper rate: 130 bpm  AV Delay: paced- 180 ms  Sensed-150 ms  Atrial - Amplitude: 1.5 V   Pulse width: 0.4 ms   Sensitivity: 0.3 mV     Ventricular - Amplitude: 3.75V  Pulse width: 0.4 ms  Sensitivity: 0.9 mV      Changes made: No changes were made this session.    Conclusions: Normal Pacemaker Function, Stable Pacing and Sensing Thresholds, and Adequate Battery Reserve    Remote Monitor:  Yes, requires manual transmissions    Follow up: Annually in office, every 3 months via [employer]     or masses    CHEST: Evaluation - auscultation and observation  Findings: Normal - clear to auscultation, breath sounds normal, no rhonchi - wheezes - or rales.. BREAST: Normal.  KERRI SCALE: Stage 2: Breast - budding     CARDIO: Evaluation by auscultation   Findings: Normal - RRR, normal S1&S2, no murmurs or extra sounds noted      ABDOMEN: Evaluation for bowel sounds, organomegaly, masses and tenderness  Findings: Normal - soft, no tenderness, no masses, no organomegaly    : Evaluation by inspections.  Findings: Normal.  KERRI SCALE: Stage 1: Pubic hair - none.    MUS-SKEL: Evaluation for swelling, edema, range of motion or other abnormalities.  Findings: Normal, no scoliosis or other abnormalities.     SKIN: Evaluation for rashes, acne and lesions  Findings: Normal - no rashes or lesions    NEURO: Evaluation by observation.  Findings: Normal - Alert and oriented X4    Assessment   Normal Health Maintenance Visit     1. Immunizations - already received flu vaccine this season.     Counseling  Weight management:  The patient was counseled regarding nutrition and physical activity  Reviewed Discussion and Guidance Topics: diet: fast food, fluoride, sleep: regular bed time, sleep: regular bedtime, discipline: chores, limits , school: study time, limit TV, social: peers and  development: puberty/body changes, periods-girls    Screen Time Assessment and Counseling  Number of hours of screen time per day: 1-2 hours.  Discussed anticipatory guidance for screen time activity: Yes    Immunizations: per orders.  History of previous adverse reactions to immunizations? no    Blood Pressure  Blood pressure percentiles are 95 % systolic and 77 % diastolic based on the 2017 AAP Clinical Practice Guideline. This reading is in the elevated blood pressure range (BP >= 90th percentile).    Blood pressure is appropriate for age    Follow-up yearly for a well visit, or sooner as needed.

## 2023-09-01 ENCOUNTER — TELEPHONE (OUTPATIENT)
Dept: HEMATOLOGY | Age: 73
End: 2023-09-01

## 2023-09-01 DIAGNOSIS — D50.8 IRON DEFICIENCY ANEMIA SECONDARY TO INADEQUATE DIETARY IRON INTAKE: ICD-10-CM

## 2023-09-01 DIAGNOSIS — D69.6 THROMBOCYTOPENIA (HCC): Primary | ICD-10-CM

## 2023-09-01 NOTE — PROGRESS NOTES
noted.    Serology studies on 7/26/2018:  Iron 41   Iron saturation 13%   Ferritin 97.4   TIBC 316   Reticulocyte count 0.0521      Haptoglobin <10   Hemoglobin 8.7 and hematocrit 27.8    eLny received Venofer for a total of 1000 mg, completed on 7/29/2018. Serology studies on 5/6/2019, Obtained by Dr. Bebeto Eason:  Iron 54   B12 876   Folate 7.61   Copper 135     Serology studies on 11/22/2019:  Iron 74  Iron saturation 20.1%  TIBC is 368  Ferritin 88  Folate 16  Hemoglobin 11.4/MCV 92.0    Serology studies on 10/28/2020  Ferritin 259  Iron 45  TIBC 248  Vitamin B12 796    Serology studies on 2/25/2021  Iron 53  TIBC 314  Iron saturation 17  Ferritin 81.7  Vitamin B-12:506   Folate 9.7    Serology studies on 3/11/2021  Vitamin B-12:  806  Vitamin D: 25.4    4/13/2021 CT Abdomen/Pelvis without documented reported as stable with no change from previous scan. No acute abnormality of the abdomen or pelvis. Evidence of previous gastric bypass surgery. Moderate thickening of the stomach in the area of the surgery is probably due to incomplete distention. However possibility of an inflammatory or neoplastic process may not be excluded. Serology studies on 4/29/2021  Sed rate 53  CRP 5.92    5/7/2021  EGD/Colon at [facility]: Path revealed A. Stomach, random gastric biopsies: 1. Benign gastric mucosa with minimal chronic inflammation. 2. No Helicobacter pylori organisms identified by immunohistochemical stain. B. Colon, random colonic biopsies: Benign colonic mucosa with no significant histopathologic changes. 5/7/2021 Barium enema due to incomplete colonoscopy: tortuous colon: No obvious mucosal lesion, mass effect or colonic stricture is identified, there is somewhat limited visualization of the splenic flexure due to overlapping bowel loops. Scattered sigmoid diverticula are present.      6/1/2021 Bilateral mammograms was reported as no mammographic evidence of malignancy    Serology studies on

## 2023-09-01 NOTE — TELEPHONE ENCOUNTER
Called and spoke with patient about their upcoming appt with Frederick Morris on 09/06/23. Patient voiced understanding.

## 2023-09-06 ENCOUNTER — OFFICE VISIT (OUTPATIENT)
Dept: HEMATOLOGY | Age: 73
End: 2023-09-06
Payer: MEDICARE

## 2023-09-06 ENCOUNTER — HOSPITAL ENCOUNTER (OUTPATIENT)
Dept: INFUSION THERAPY | Age: 73
Discharge: HOME | End: 2023-09-06
Payer: MEDICARE

## 2023-09-06 ENCOUNTER — TELEPHONE (OUTPATIENT)
Dept: CARDIOLOGY | Facility: CLINIC | Age: 73
End: 2023-09-06
Payer: MEDICARE

## 2023-09-06 VITALS
SYSTOLIC BLOOD PRESSURE: 124 MMHG | OXYGEN SATURATION: 99 % | BODY MASS INDEX: 38.53 KG/M2 | HEART RATE: 79 BPM | WEIGHT: 246 LBS | DIASTOLIC BLOOD PRESSURE: 70 MMHG

## 2023-09-06 DIAGNOSIS — D50.8 IRON DEFICIENCY ANEMIA SECONDARY TO INADEQUATE DIETARY IRON INTAKE: ICD-10-CM

## 2023-09-06 DIAGNOSIS — D50.8 IRON DEFICIENCY ANEMIA SECONDARY TO INADEQUATE DIETARY IRON INTAKE: Primary | ICD-10-CM

## 2023-09-06 DIAGNOSIS — D69.6 THROMBOCYTOPENIA (HCC): ICD-10-CM

## 2023-09-06 LAB
BASOPHILS # BLD: 0.02 K/UL (ref 0.01–0.08)
BASOPHILS NFR BLD: 0.4 % (ref 0.1–1.2)
EOSINOPHIL # BLD: 0.08 K/UL (ref 0.04–0.54)
EOSINOPHIL NFR BLD: 1.4 % (ref 0.7–7)
ERYTHROCYTE [DISTWIDTH] IN BLOOD BY AUTOMATED COUNT: 16.4 % (ref 11.7–14.4)
HCT VFR BLD AUTO: 38.9 % (ref 34.1–44.9)
HGB BLD-MCNC: 12.3 G/DL (ref 11.2–15.7)
LYMPHOCYTES # BLD: 1.83 K/UL (ref 1.18–3.74)
LYMPHOCYTES NFR BLD: 32.4 % (ref 19.3–53.1)
MCH RBC QN AUTO: 28.9 PG (ref 25.6–32.2)
MCHC RBC AUTO-ENTMCNC: 31.6 G/DL (ref 32.3–35.5)
MCV RBC AUTO: 91.5 FL (ref 79.4–94.8)
MONOCYTES # BLD: 0.68 K/UL (ref 0.24–0.82)
MONOCYTES NFR BLD: 12.1 % (ref 4.7–12.5)
NEUTROPHILS # BLD: 3 K/UL (ref 1.56–6.13)
NEUTS SEG NFR BLD: 53.2 % (ref 34–71.1)
PLATELET # BLD AUTO: 169 K/UL (ref 182–369)
PMV BLD AUTO: 11.1 FL (ref 7.4–10.4)
RBC # BLD AUTO: 4.25 M/UL (ref 3.93–5.22)
WBC # BLD AUTO: 5.64 K/UL (ref 3.98–10.04)

## 2023-09-06 PROCEDURE — 1123F ACP DISCUSS/DSCN MKR DOCD: CPT | Performed by: NURSE PRACTITIONER

## 2023-09-06 PROCEDURE — 1036F TOBACCO NON-USER: CPT | Performed by: NURSE PRACTITIONER

## 2023-09-06 PROCEDURE — 85025 COMPLETE CBC W/AUTO DIFF WBC: CPT

## 2023-09-06 PROCEDURE — 1090F PRES/ABSN URINE INCON ASSESS: CPT | Performed by: NURSE PRACTITIONER

## 2023-09-06 PROCEDURE — G8427 DOCREV CUR MEDS BY ELIG CLIN: HCPCS | Performed by: NURSE PRACTITIONER

## 2023-09-06 PROCEDURE — 3078F DIAST BP <80 MM HG: CPT | Performed by: NURSE PRACTITIONER

## 2023-09-06 PROCEDURE — 99212 OFFICE O/P EST SF 10 MIN: CPT

## 2023-09-06 PROCEDURE — 99213 OFFICE O/P EST LOW 20 MIN: CPT | Performed by: NURSE PRACTITIONER

## 2023-09-06 PROCEDURE — 3074F SYST BP LT 130 MM HG: CPT | Performed by: NURSE PRACTITIONER

## 2023-09-06 PROCEDURE — G8399 PT W/DXA RESULTS DOCUMENT: HCPCS | Performed by: NURSE PRACTITIONER

## 2023-09-06 PROCEDURE — 36415 COLL VENOUS BLD VENIPUNCTURE: CPT

## 2023-09-06 PROCEDURE — G8417 CALC BMI ABV UP PARAM F/U: HCPCS | Performed by: NURSE PRACTITIONER

## 2023-09-06 PROCEDURE — 3017F COLORECTAL CA SCREEN DOC REV: CPT | Performed by: NURSE PRACTITIONER

## 2023-09-06 RX ORDER — LISINOPRIL 10 MG/1
10 TABLET ORAL DAILY
COMMUNITY
Start: 2023-08-30

## 2023-09-06 NOTE — TELEPHONE ENCOUNTER
----- Message from CATRACHITO Chen sent at 9/6/2023  2:31 PM CDT -----  Ok. Thanks.   ----- Message -----  From: Izabela Robert MA  Sent: 9/6/2023   2:27 PM CDT  To: CATRACHITO Chen    Patient states she had Lisinopril 40mg at home and that is what she is taking.  Her BP was 124/70 today.  She has not taken it since office visit except for today.  Izabela Robert MA    ----- Message -----  From: Marie Mendez APRN  Sent: 9/6/2023   2:11 PM CDT  To: Izabela Robert MA    Can you call and see what her BP has been running since I restarted her lisinopril?

## 2023-09-07 ASSESSMENT — ENCOUNTER SYMPTOMS
EYES NEGATIVE: 1
RESPIRATORY NEGATIVE: 1
EYE PAIN: 0
GASTROINTESTINAL NEGATIVE: 1
SHORTNESS OF BREATH: 0
NAUSEA: 0
DIARRHEA: 0
COUGH: 0
EYE DISCHARGE: 0
WHEEZING: 0
EYE REDNESS: 0
CONSTIPATION: 0
BACK PAIN: 0
VOMITING: 0
BLOOD IN STOOL: 0
ABDOMINAL PAIN: 0
SORE THROAT: 0

## 2023-09-13 ENCOUNTER — ANTI-COAG VISIT (OUTPATIENT)
Dept: PRIMARY CARE CLINIC | Age: 73
End: 2023-09-13
Payer: MEDICARE

## 2023-09-13 DIAGNOSIS — Z79.01 ANTICOAGULATED ON COUMADIN: Primary | ICD-10-CM

## 2023-09-13 LAB — INR BLD: 1.3

## 2023-09-13 PROCEDURE — 93793 ANTICOAG MGMT PT WARFARIN: CPT | Performed by: INTERNAL MEDICINE

## 2023-09-13 NOTE — PROGRESS NOTES
HOME MONITORING REPORT    INR today:   Results for orders placed or performed in visit on 09/13/23   Protime-INR   Result Value Ref Range    INR 1.30        INR Goal: 2.0-3.0    Dosing Plan  As of 9/13/2023      TTR:  31.7 % (5.3 y)   Full warfarin instructions:  9/13: 15 mg; 9/14: 15 mg; Otherwise 11.25 mg every Sun; 7.5 mg all other days              Gi Vega thinks she has missed at least five doses in the last week. We discussed the importance of getting her back on track. She will take 15 mg tonight and tomorrow night, then  go back to her current dose of 7.5 mg a day and recheck in one week.   Patient/Caregiver voiced understanding

## 2023-09-21 ENCOUNTER — ANTI-COAG VISIT (OUTPATIENT)
Dept: PRIMARY CARE CLINIC | Age: 73
End: 2023-09-21
Payer: MEDICARE

## 2023-09-21 DIAGNOSIS — Z79.01 ANTICOAGULATED ON COUMADIN: Primary | ICD-10-CM

## 2023-09-21 LAB — INR BLD: 1.5

## 2023-09-21 PROCEDURE — 93793 ANTICOAG MGMT PT WARFARIN: CPT | Performed by: INTERNAL MEDICINE

## 2023-09-21 NOTE — PROGRESS NOTES
HOME MONITORING REPORT    INR today:   Results for orders placed or performed in visit on 09/21/23   Protime-INR   Result Value Ref Range    INR 1.50        INR Goal: 2.0-3.0    Dosing Plan  As of 9/21/2023      TTR:  31.6 % (5.3 y)   Full warfarin instructions:  9/21: 15 mg; 9/22: 15 mg; Otherwise 11.25 mg every Sun; 7.5 mg all other days              She missed at least one dose last week. PLAN: Advised patient/caregiver to increase her dose today and tomorrow to 15 mg, then continue current dose and recheck in one week. Patient/Caregiver voiced understanding. I again stressed the importance of taking her medication daily as prescribed.

## 2023-09-26 DIAGNOSIS — K52.9 GASTROENTERITIS: ICD-10-CM

## 2023-09-26 DIAGNOSIS — K21.9 GASTROESOPHAGEAL REFLUX DISEASE WITHOUT ESOPHAGITIS: ICD-10-CM

## 2023-09-26 DIAGNOSIS — R12 CHRONIC HEARTBURN: ICD-10-CM

## 2023-09-26 DIAGNOSIS — E78.5 HYPERLIPIDEMIA, UNSPECIFIED HYPERLIPIDEMIA TYPE: ICD-10-CM

## 2023-09-27 DIAGNOSIS — E53.8 B12 DEFICIENCY: ICD-10-CM

## 2023-09-27 DIAGNOSIS — I82.5Y9 CHRONIC DEEP VEIN THROMBOSIS (DVT) OF PROXIMAL VEIN OF LOWER EXTREMITY, UNSPECIFIED LATERALITY (HCC): ICD-10-CM

## 2023-09-27 DIAGNOSIS — Z79.01 ANTICOAGULATED ON COUMADIN: ICD-10-CM

## 2023-09-27 DIAGNOSIS — E55.9 VITAMIN D DEFICIENCY: ICD-10-CM

## 2023-09-27 DIAGNOSIS — F41.9 ANXIETY AND DEPRESSION: Primary | ICD-10-CM

## 2023-09-27 DIAGNOSIS — E11.21 TYPE II DIABETES MELLITUS WITH NEPHROPATHY (HCC): ICD-10-CM

## 2023-09-27 DIAGNOSIS — D50.8 IRON DEFICIENCY ANEMIA SECONDARY TO INADEQUATE DIETARY IRON INTAKE: ICD-10-CM

## 2023-09-27 DIAGNOSIS — F32.A ANXIETY AND DEPRESSION: Primary | ICD-10-CM

## 2023-09-27 LAB
25(OH)D3 SERPL-MCNC: 27.8 NG/ML
ALBUMIN SERPL-MCNC: 4.4 G/DL (ref 3.5–5.2)
ALP SERPL-CCNC: 81 U/L (ref 35–104)
ALT SERPL-CCNC: 13 U/L (ref 5–33)
ANION GAP SERPL CALCULATED.3IONS-SCNC: 18 MMOL/L (ref 7–19)
AST SERPL-CCNC: 26 U/L (ref 5–32)
BACTERIA URNS QL MICRO: ABNORMAL /HPF
BASOPHILS # BLD: 0 K/UL (ref 0–0.2)
BASOPHILS NFR BLD: 0.7 % (ref 0–1)
BILIRUB SERPL-MCNC: 0.3 MG/DL (ref 0.2–1.2)
BILIRUB UR QL STRIP: NEGATIVE
BUN SERPL-MCNC: 30 MG/DL (ref 8–23)
CALCIUM SERPL-MCNC: 9.2 MG/DL (ref 8.8–10.2)
CHLORIDE SERPL-SCNC: 104 MMOL/L (ref 98–111)
CHOLEST SERPL-MCNC: 196 MG/DL (ref 160–199)
CLARITY UR: CLEAR
CO2 SERPL-SCNC: 19 MMOL/L (ref 22–29)
COLOR UR: YELLOW
CREAT SERPL-MCNC: 1.7 MG/DL (ref 0.5–0.9)
CREAT UR-MCNC: 160.7 MG/DL (ref 28–217)
CRYSTALS URNS MICRO: ABNORMAL /HPF
EOSINOPHIL # BLD: 0.1 K/UL (ref 0–0.6)
EOSINOPHIL NFR BLD: 1.7 % (ref 0–5)
EPI CELLS #/AREA URNS AUTO: 7 /HPF (ref 0–5)
ERYTHROCYTE [DISTWIDTH] IN BLOOD BY AUTOMATED COUNT: 16.3 % (ref 11.5–14.5)
FERRITIN SERPL-MCNC: 141.3 NG/ML (ref 13–150)
GLUCOSE SERPL-MCNC: 89 MG/DL (ref 74–109)
GLUCOSE UR STRIP.AUTO-MCNC: NEGATIVE MG/DL
HBA1C MFR BLD: 5.5 % (ref 4–6)
HCT VFR BLD AUTO: 37.1 % (ref 37–47)
HDLC SERPL-MCNC: 106 MG/DL (ref 65–121)
HGB BLD-MCNC: 11.8 G/DL (ref 12–16)
HGB UR STRIP.AUTO-MCNC: NEGATIVE MG/L
HYALINE CASTS #/AREA URNS AUTO: 2 /HPF (ref 0–8)
IMM GRANULOCYTES # BLD: 0 K/UL
IRON SATN MFR SERPL: 15 % (ref 14–50)
IRON SERPL-MCNC: 53 UG/DL (ref 37–145)
KETONES UR STRIP.AUTO-MCNC: NEGATIVE MG/DL
LDLC SERPL CALC-MCNC: 76 MG/DL
LEUKOCYTE ESTERASE UR QL STRIP.AUTO: ABNORMAL
LYMPHOCYTES # BLD: 1.5 K/UL (ref 1.1–4.5)
LYMPHOCYTES NFR BLD: 35.3 % (ref 20–40)
MCH RBC QN AUTO: 30.1 PG (ref 27–31)
MCHC RBC AUTO-ENTMCNC: 31.8 G/DL (ref 33–37)
MCV RBC AUTO: 94.6 FL (ref 81–99)
MICROALBUMIN UR-MCNC: <1.2 MG/DL (ref 0–19)
MICROALBUMIN/CREAT UR-RTO: NORMAL MG/G
MONOCYTES # BLD: 0.5 K/UL (ref 0–0.9)
MONOCYTES NFR BLD: 12.2 % (ref 0–10)
NEUTROPHILS # BLD: 2.1 K/UL (ref 1.5–7.5)
NEUTS SEG NFR BLD: 49.6 % (ref 50–65)
NITRITE UR QL STRIP.AUTO: NEGATIVE
PH UR STRIP.AUTO: 5.5 [PH] (ref 5–8)
PLATELET # BLD AUTO: 172 K/UL (ref 130–400)
PMV BLD AUTO: 12.3 FL (ref 9.4–12.3)
POTASSIUM SERPL-SCNC: 4.6 MMOL/L (ref 3.5–5)
PROT SERPL-MCNC: 8.2 G/DL (ref 6.6–8.7)
PROT UR STRIP.AUTO-MCNC: NEGATIVE MG/DL
RBC # BLD AUTO: 3.92 M/UL (ref 4.2–5.4)
RBC #/AREA URNS AUTO: 1 /HPF (ref 0–4)
SODIUM SERPL-SCNC: 141 MMOL/L (ref 136–145)
SP GR UR STRIP.AUTO: 1.01 (ref 1–1.03)
TIBC SERPL-MCNC: 353 UG/DL (ref 250–400)
TRIGL SERPL-MCNC: 69 MG/DL (ref 0–149)
UROBILINOGEN UR STRIP.AUTO-MCNC: 1 E.U./DL
VIT B12 SERPL-MCNC: 994 PG/ML (ref 211–946)
WBC # BLD AUTO: 4.2 K/UL (ref 4.8–10.8)
WBC #/AREA URNS AUTO: 6 /HPF (ref 0–5)

## 2023-09-27 RX ORDER — ESCITALOPRAM OXALATE 20 MG/1
20 TABLET ORAL DAILY
Qty: 90 TABLET | Refills: 3 | Status: SHIPPED | OUTPATIENT
Start: 2023-09-27

## 2023-09-27 RX ORDER — ISOPROPYL ALCOHOL 70 ML/100ML
SWAB TOPICAL
Qty: 300 EACH | Refills: 1 | Status: SHIPPED | OUTPATIENT
Start: 2023-09-27

## 2023-09-27 RX ORDER — BLOOD-GLUCOSE CONTROL, LOW
EACH MISCELLANEOUS
Qty: 1 EACH | Refills: 0 | Status: SHIPPED | OUTPATIENT
Start: 2023-09-27

## 2023-09-27 RX ORDER — PANTOPRAZOLE SODIUM 40 MG/1
TABLET, DELAYED RELEASE ORAL
Qty: 180 TABLET | Refills: 1 | Status: SHIPPED | OUTPATIENT
Start: 2023-09-27

## 2023-09-27 RX ORDER — WARFARIN SODIUM 7.5 MG/1
TABLET ORAL
Qty: 143 TABLET | Refills: 1 | Status: SHIPPED | OUTPATIENT
Start: 2023-09-27

## 2023-09-27 RX ORDER — ROSUVASTATIN CALCIUM 5 MG/1
5 TABLET, COATED ORAL DAILY
Qty: 90 TABLET | Refills: 3 | Status: SHIPPED | OUTPATIENT
Start: 2023-09-27

## 2023-09-29 DIAGNOSIS — N39.0 URINARY TRACT INFECTION WITHOUT HEMATURIA, SITE UNSPECIFIED: Primary | ICD-10-CM

## 2023-09-29 RX ORDER — CEFDINIR 300 MG/1
300 CAPSULE ORAL 2 TIMES DAILY
Qty: 20 CAPSULE | Refills: 0 | Status: SHIPPED | OUTPATIENT
Start: 2023-09-29 | End: 2023-10-09

## 2023-10-02 ENCOUNTER — HOSPITAL ENCOUNTER (OUTPATIENT)
Dept: WOMENS IMAGING | Age: 73
Discharge: HOME | End: 2023-10-02
Attending: INTERNAL MEDICINE
Payer: MEDICARE

## 2023-10-02 DIAGNOSIS — Z12.31 ENCOUNTER FOR SCREENING MAMMOGRAM FOR MALIGNANT NEOPLASM OF BREAST: ICD-10-CM

## 2023-10-02 PROCEDURE — 77063 BREAST TOMOSYNTHESIS BI: CPT

## 2023-10-03 ENCOUNTER — TELEPHONE (OUTPATIENT)
Dept: PRIMARY CARE CLINIC | Age: 73
End: 2023-10-03

## 2023-10-03 ENCOUNTER — ANTI-COAG VISIT (OUTPATIENT)
Dept: PRIMARY CARE CLINIC | Age: 73
End: 2023-10-03
Payer: MEDICARE

## 2023-10-03 DIAGNOSIS — Z79.01 ANTICOAGULATED ON COUMADIN: Primary | ICD-10-CM

## 2023-10-03 DIAGNOSIS — Z79.01 ANTICOAGULATED ON COUMADIN: ICD-10-CM

## 2023-10-03 LAB
INR BLD: 3.2
INR PPP: 3.2 (ref 0.88–1.18)
PROTHROMBIN TIME: 32 SEC (ref 12–14.6)

## 2023-10-03 PROCEDURE — 93793 ANTICOAG MGMT PT WARFARIN: CPT | Performed by: INTERNAL MEDICINE

## 2023-10-03 NOTE — TELEPHONE ENCOUNTER
Patient sent in an abnormal INR result of 8 to [facility] last night. I spoke with her this morning, she did not take any coumadin last night. I believe this reading may be an error code on her meter. I have asked he to go to the lab this morning and have the test drawn there for accuracy. She voices understanding and agrees to this plan.   Bruno Long [place]

## 2023-10-03 NOTE — PROGRESS NOTES
HOME MONITORING REPORT    INR today:   Results for orders placed or performed in visit on 10/03/23   Protime-INR   Result Value Ref Range    INR 3.20        INR Goal: 2.0-3.0    Dosing Plan  As of 10/3/2023      TTR:  31.8 % (5.4 y)   Full warfarin instructions:  11.25 mg every Sun; 7.5 mg all other days                Correct result from lab draw. Patient advised that her reading of 8 was most likely an error code. PLAN: Advised patient/caregiver to continue current dose and recheck in one week.   No adjustment made as she usually forgets a dose  a week Patient/Caregiver voiced understanding

## 2023-10-04 ENCOUNTER — OFFICE VISIT (OUTPATIENT)
Dept: INTERNAL MEDICINE | Age: 73
End: 2023-10-04

## 2023-10-04 VITALS
BODY MASS INDEX: 38.2 KG/M2 | HEART RATE: 86 BPM | HEIGHT: 67 IN | WEIGHT: 243.4 LBS | SYSTOLIC BLOOD PRESSURE: 120 MMHG | RESPIRATION RATE: 20 BRPM | DIASTOLIC BLOOD PRESSURE: 76 MMHG | OXYGEN SATURATION: 97 %

## 2023-10-04 DIAGNOSIS — N28.9 RENAL INSUFFICIENCY: ICD-10-CM

## 2023-10-04 DIAGNOSIS — I10 HYPERTENSION, UNSPECIFIED TYPE: ICD-10-CM

## 2023-10-04 DIAGNOSIS — E11.49 OTHER DIABETIC NEUROLOGICAL COMPLICATION ASSOCIATED WITH TYPE 2 DIABETES MELLITUS (HCC): ICD-10-CM

## 2023-10-04 DIAGNOSIS — G47.30 SLEEP APNEA, UNSPECIFIED TYPE: ICD-10-CM

## 2023-10-04 DIAGNOSIS — K21.9 GASTROESOPHAGEAL REFLUX DISEASE WITHOUT ESOPHAGITIS: ICD-10-CM

## 2023-10-04 DIAGNOSIS — Z23 NEEDS FLU SHOT: ICD-10-CM

## 2023-10-04 DIAGNOSIS — F32.89 OTHER DEPRESSION: ICD-10-CM

## 2023-10-04 DIAGNOSIS — N39.0 URINARY TRACT INFECTION WITHOUT HEMATURIA, SITE UNSPECIFIED: ICD-10-CM

## 2023-10-04 DIAGNOSIS — Z23 FLU VACCINE NEED: ICD-10-CM

## 2023-10-04 DIAGNOSIS — Z91.09 ENVIRONMENTAL ALLERGIES: ICD-10-CM

## 2023-10-04 DIAGNOSIS — Z23 NEED FOR VACCINATION: ICD-10-CM

## 2023-10-04 DIAGNOSIS — H35.029: ICD-10-CM

## 2023-10-04 DIAGNOSIS — M54.50 CHRONIC LOW BACK PAIN, UNSPECIFIED BACK PAIN LATERALITY, UNSPECIFIED WHETHER SCIATICA PRESENT: ICD-10-CM

## 2023-10-04 DIAGNOSIS — E53.8 B12 DEFICIENCY: ICD-10-CM

## 2023-10-04 DIAGNOSIS — E78.5 HYPERLIPIDEMIA, UNSPECIFIED HYPERLIPIDEMIA TYPE: ICD-10-CM

## 2023-10-04 DIAGNOSIS — I82.5Y9 CHRONIC DEEP VEIN THROMBOSIS (DVT) OF PROXIMAL VEIN OF LOWER EXTREMITY, UNSPECIFIED LATERALITY (HCC): ICD-10-CM

## 2023-10-04 DIAGNOSIS — G89.29 CHRONIC LOW BACK PAIN, UNSPECIFIED BACK PAIN LATERALITY, UNSPECIFIED WHETHER SCIATICA PRESENT: ICD-10-CM

## 2023-10-04 DIAGNOSIS — J45.20 MILD INTERMITTENT REACTIVE AIRWAY DISEASE WITHOUT COMPLICATION: ICD-10-CM

## 2023-10-04 DIAGNOSIS — E11.21 TYPE II DIABETES MELLITUS WITH NEPHROPATHY (HCC): Primary | ICD-10-CM

## 2023-10-04 DIAGNOSIS — E55.9 VITAMIN D DEFICIENCY: ICD-10-CM

## 2023-10-04 DIAGNOSIS — E03.9 HYPOTHYROIDISM, UNSPECIFIED TYPE: ICD-10-CM

## 2023-10-04 LAB
ANION GAP SERPL CALCULATED.3IONS-SCNC: 9 MMOL/L (ref 7–19)
BUN SERPL-MCNC: 23 MG/DL (ref 8–23)
CALCIUM SERPL-MCNC: 9.7 MG/DL (ref 8.8–10.2)
CHLORIDE SERPL-SCNC: 103 MMOL/L (ref 98–111)
CO2 SERPL-SCNC: 28 MMOL/L (ref 22–29)
CREAT SERPL-MCNC: 1.3 MG/DL (ref 0.5–0.9)
GLUCOSE SERPL-MCNC: 89 MG/DL (ref 74–109)
POTASSIUM SERPL-SCNC: 4.6 MMOL/L (ref 3.5–5)
SODIUM SERPL-SCNC: 140 MMOL/L (ref 136–145)

## 2023-10-04 RX ORDER — ZOSTER VACCINE RECOMBINANT, ADJUVANTED 50 MCG/0.5
0.5 KIT INTRAMUSCULAR SEE ADMIN INSTRUCTIONS
Qty: 0.5 ML | Refills: 0 | Status: SHIPPED | OUTPATIENT
Start: 2023-10-04 | End: 2024-04-01

## 2023-10-04 RX ORDER — CYANOCOBALAMIN 1000 UG/ML
1000 INJECTION, SOLUTION INTRAMUSCULAR; SUBCUTANEOUS ONCE
Status: COMPLETED | OUTPATIENT
Start: 2023-10-04 | End: 2023-10-04

## 2023-10-04 RX ORDER — ERGOCALCIFEROL 1.25 MG/1
50000 CAPSULE ORAL WEEKLY
Qty: 12 CAPSULE | Refills: 1 | Status: SHIPPED | OUTPATIENT
Start: 2023-10-04

## 2023-10-04 RX ADMIN — CYANOCOBALAMIN 1000 MCG: 1000 INJECTION, SOLUTION INTRAMUSCULAR; SUBCUTANEOUS at 10:58

## 2023-10-04 NOTE — PROGRESS NOTES
Right leg numbness     Right sided sciatica     Sarcoidosis     with liver involvement    Sciatica     Secondary hyperparathyroidism (HCC)     Shingles     Shortness of breath     Sleep apnea     Stomach ulcer     Syncope     Visual loss, one eye       Past Surgical History:   Procedure Laterality Date    APPENDECTOMY      CARDIAC CATHETERIZATION  10/21/13  [facility]    EF over 60%    CHOLECYSTECTOMY      COLONOSCOPY  02/2010    negative    COLONOSCOPY  02/22/2010    Dr Ayla Snider    COLONOSCOPY  04/01/2016    Dr LAKHWINDER Horvath-internal hemorrhoids, 5 yr recall    COLONOSCOPY N/A 05/07/2021    Dr Indy Crowder looping/tortuosity throughout the left colon, BE=Normal    DILATATION, ESOPHAGUS      EYE SURGERY      Cyst on Right eye    EYE SURGERY      GASTRIC BYPASS SURGERY      GASTRIC BYPASS SURGERY      HERNIA REPAIR      HYSTERECTOMY (CERVIX STATUS UNKNOWN)  1974    Complete    INCONTINENCE SURGERY      Bladder Sling    INFECTED SKIN DEBRIDEMENT Right     dog bite right forearm    OTHER SURGICAL HISTORY      IVC filter    PACEMAKER INSERTION      PACEMAKER PLACEMENT      medtronic    AK ARTHRP KNE CONDYLE&PLATU MEDIAL&LAT COMPARTMENTS Right 03/26/2018    TOTAL KNEE REPLACEMENT COMPLEX PRIMARY performed by Adam Rubin MD at [address] [facility] (CERVIX REMOVED) Bilateral 1974    age 21    TONSILLECTOMY AND ADENOIDECTOMY      TOTAL KNEE ARTHROPLASTY Left 07/19/2022    UPPER GASTROINTESTINAL ENDOSCOPY  12/2011    gerd s/p gastric bypass    UPPER GASTROINTESTINAL ENDOSCOPY  02/2014    normal s/p gastric bypass    UPPER GASTROINTESTINAL ENDOSCOPY  02/2010    biopsy neg Barretts, chronic reflux esophagitis s/p gastric bypass    UPPER GASTROINTESTINAL ENDOSCOPY  07/2006    unremarkable s/p gastric bypass    UPPER GASTROINTESTINAL ENDOSCOPY  08/10/2015    Dr Jaguar Lagos ENDOSCOPY N/A 04/01/2016    Dr. Islas Mohs:  H Pylori(-), HH,

## 2023-10-09 SDOH — ECONOMIC STABILITY: FOOD INSECURITY: WITHIN THE PAST 12 MONTHS, YOU WORRIED THAT YOUR FOOD WOULD RUN OUT BEFORE YOU GOT MONEY TO BUY MORE.: SOMETIMES TRUE

## 2023-10-09 SDOH — ECONOMIC STABILITY: INCOME INSECURITY: HOW HARD IS IT FOR YOU TO PAY FOR THE VERY BASICS LIKE FOOD, HOUSING, MEDICAL CARE, AND HEATING?: SOMEWHAT HARD

## 2023-10-09 SDOH — ECONOMIC STABILITY: FOOD INSECURITY: WITHIN THE PAST 12 MONTHS, THE FOOD YOU BOUGHT JUST DIDN'T LAST AND YOU DIDN'T HAVE MONEY TO GET MORE.: SOMETIMES TRUE

## 2023-10-09 ASSESSMENT — PATIENT HEALTH QUESTIONNAIRE - PHQ9
8. MOVING OR SPEAKING SO SLOWLY THAT OTHER PEOPLE COULD HAVE NOTICED. OR THE OPPOSITE, BEING SO FIGETY OR RESTLESS THAT YOU HAVE BEEN MOVING AROUND A LOT MORE THAN USUAL: 0
SUM OF ALL RESPONSES TO PHQ QUESTIONS 1-9: 2
7. TROUBLE CONCENTRATING ON THINGS, SUCH AS READING THE NEWSPAPER OR WATCHING TELEVISION: 0
10. IF YOU CHECKED OFF ANY PROBLEMS, HOW DIFFICULT HAVE THESE PROBLEMS MADE IT FOR YOU TO DO YOUR WORK, TAKE CARE OF THINGS AT HOME, OR GET ALONG WITH OTHER PEOPLE: 0
2. FEELING DOWN, DEPRESSED OR HOPELESS: 1
1. LITTLE INTEREST OR PLEASURE IN DOING THINGS: 1
SUM OF ALL RESPONSES TO PHQ QUESTIONS 1-9: 2
SUM OF ALL RESPONSES TO PHQ QUESTIONS 1-9: 2
3. TROUBLE FALLING OR STAYING ASLEEP: 0
9. THOUGHTS THAT YOU WOULD BE BETTER OFF DEAD, OR OF HURTING YOURSELF: 0
SUM OF ALL RESPONSES TO PHQ QUESTIONS 1-9: 2
5. POOR APPETITE OR OVEREATING: 0
SUM OF ALL RESPONSES TO PHQ9 QUESTIONS 1 & 2: 2
6. FEELING BAD ABOUT YOURSELF - OR THAT YOU ARE A FAILURE OR HAVE LET YOURSELF OR YOUR FAMILY DOWN: 0
4. FEELING TIRED OR HAVING LITTLE ENERGY: 0

## 2023-10-09 ASSESSMENT — ENCOUNTER SYMPTOMS
SHORTNESS OF BREATH: 0
COUGH: 0
VOMITING: 0
EYE ITCHING: 0
STRIDOR: 0
BLOOD IN STOOL: 0
DIARRHEA: 0
CONSTIPATION: 0
APNEA: 0
TROUBLE SWALLOWING: 0
RHINORRHEA: 0
ABDOMINAL PAIN: 0
CHEST TIGHTNESS: 0
EYE DISCHARGE: 0
VOICE CHANGE: 0
SINUS PAIN: 0
BACK PAIN: 1
WHEEZING: 0
NAUSEA: 0
SORE THROAT: 0
COLOR CHANGE: 0
SINUS PRESSURE: 0
ABDOMINAL DISTENTION: 0

## 2023-10-12 ENCOUNTER — ANTI-COAG VISIT (OUTPATIENT)
Dept: PRIMARY CARE CLINIC | Age: 73
End: 2023-10-12
Payer: MEDICARE

## 2023-10-12 DIAGNOSIS — Z79.01 ANTICOAGULATED ON COUMADIN: Primary | ICD-10-CM

## 2023-10-12 LAB — INR BLD: 3.9

## 2023-10-12 PROCEDURE — 93793 ANTICOAG MGMT PT WARFARIN: CPT | Performed by: INTERNAL MEDICINE

## 2023-10-12 NOTE — PROGRESS NOTES
HOME MONITORING REPORT    INR today:   Results for orders placed or performed in visit on 10/12/23   Protime-INR   Result Value Ref Range    INR 3.90        INR Goal: 2.0-3.0    Dosing Plan  As of 10/12/2023      TTR:  31.6 % (5.4 y)   Full warfarin instructions:  10/12: Hold; Otherwise 11.25 mg every Sun; 7.5 mg all other days                PLAN: Advised patient/caregiver to hold her dose tonight only, then continue current dose and recheck in one week.   Patient/Caregiver voiced understanding

## 2023-10-13 DIAGNOSIS — G62.9 NEUROPATHY: ICD-10-CM

## 2023-10-13 RX ORDER — GABAPENTIN 300 MG/1
300 CAPSULE ORAL 3 TIMES DAILY
Qty: 270 CAPSULE | Refills: 0 | Status: SHIPPED | OUTPATIENT
Start: 2023-10-13 | End: 2024-01-10

## 2023-10-13 NOTE — TELEPHONE ENCOUNTER
Sarahi Hatch called to request a refill on her medication. Last office visit : 10/4/2023   Next office visit : 1/8/2024     Last UDS:   Amphetamine Screen, Urine   Date Value Ref Range Status   07/07/2021 NEG  Final     Barbiturate Screen, Urine   Date Value Ref Range Status   07/07/2021 NEG  Final     Benzodiazepine Screen, Urine   Date Value Ref Range Status   07/07/2021 NEG  Final     Buprenorphine Urine   Date Value Ref Range Status   07/07/2021 NEG  Final     Cocaine Metabolite Screen, Urine   Date Value Ref Range Status   07/07/2021 NEG  Final     Gabapentin Screen, Urine   Date Value Ref Range Status   07/07/2021 NEG  Final     MDMA, Urine   Date Value Ref Range Status   07/07/2021 NEG  Final     Methamphetamine, Urine   Date Value Ref Range Status   07/07/2021 NEG  Final     Opiate Scrn, Ur   Date Value Ref Range Status   07/07/2021 NEG  Final     Oxycodone Screen, Ur   Date Value Ref Range Status   07/07/2021 NEG  Final     PCP Screen, Urine   Date Value Ref Range Status   07/07/2021 NEG  Final     Propoxyphene Screen, Urine   Date Value Ref Range Status   07/07/2021 NEG  Final     THC Screen, Urine   Date Value Ref Range Status   07/07/2021 NEG  Final     Tricyclic Antidepressants, Urine   Date Value Ref Range Status   07/07/2021 NEG  Final       Last Rakesh Shallow: 10/13/2023  Medication Contract: 10/11/2021     Requested Prescriptions     Pending Prescriptions Disp Refills    gabapentin (NEURONTIN) 300 MG capsule 270 capsule 0     Sig: Take 1 capsule by mouth 3 times daily for 89 days. Please approve or refuse this medication.    Jenn Chery LPN

## 2023-10-17 DIAGNOSIS — G62.9 NEUROPATHY: ICD-10-CM

## 2023-10-17 RX ORDER — GABAPENTIN 300 MG/1
300 CAPSULE ORAL 3 TIMES DAILY
Qty: 270 CAPSULE | Refills: 0 | Status: SHIPPED | OUTPATIENT
Start: 2023-10-17 | End: 2024-01-14

## 2023-10-17 RX ORDER — GABAPENTIN 300 MG/1
300 CAPSULE ORAL 3 TIMES DAILY
Qty: 270 CAPSULE | Refills: 0 | OUTPATIENT
Start: 2023-10-17 | End: 2024-01-14

## 2023-10-17 NOTE — TELEPHONE ENCOUNTER
Luis A Flwoer called to request a refill on her medication. Last office visit : 10/4/2023   Next office visit : 1/8/2024     Requested Prescriptions     Pending Prescriptions Disp Refills    gabapentin (NEURONTIN) 300 MG capsule 270 capsule 0     Sig: Take 1 capsule by mouth 3 times daily for 89 days. Medication was sent to wrong pharmacy. Canceled the script with [facility].  Pended for correct pharmacy      Keerthi Rodriguez

## 2023-10-29 LAB — INR BLD: 1.7

## 2023-10-30 ENCOUNTER — ANTI-COAG VISIT (OUTPATIENT)
Dept: PRIMARY CARE CLINIC | Age: 73
End: 2023-10-30
Payer: MEDICARE

## 2023-10-30 DIAGNOSIS — Z79.01 ANTICOAGULATED ON COUMADIN: Primary | ICD-10-CM

## 2023-10-30 PROCEDURE — 93793 ANTICOAG MGMT PT WARFARIN: CPT | Performed by: INTERNAL MEDICINE

## 2023-10-30 NOTE — PROGRESS NOTES
HOME MONITORING REPORT    INR today:   Results for orders placed or performed in visit on 10/30/23   Protime-INR   Result Value Ref Range    INR 1.70        INR Goal: 2.0-3.0    Dosing Plan  As of 10/30/2023      TTR:  31.7 % (5.4 y)   Full warfarin instructions:  10/30: 15 mg; 11/4: 11.25 mg; Otherwise 11.25 mg every Sun; 7.5 mg all other days              She has missed at least two doses in the past week, including last night. Her daughter is in the hospital and Celester Reasons is with her. She does have her meds in her purse. I encouraged her to take them as directed. PLAN: Advised patient/caregiver to increase her dose tonight to 15 mg, then continue current dose and recheck in one week.   Patient/Caregiver voiced understanding      Electronically signed by Kyle Mckenzie MD on 10/30/2023 at 11:42 AM

## 2023-11-08 ENCOUNTER — ANTI-COAG VISIT (OUTPATIENT)
Dept: PRIMARY CARE CLINIC | Age: 73
End: 2023-11-08
Payer: MEDICARE

## 2023-11-08 DIAGNOSIS — Z79.01 ANTICOAGULATED ON COUMADIN: Primary | ICD-10-CM

## 2023-11-08 LAB — INR BLD: 1.9

## 2023-11-08 PROCEDURE — 93793 ANTICOAG MGMT PT WARFARIN: CPT | Performed by: INTERNAL MEDICINE

## 2023-11-08 NOTE — PROGRESS NOTES
HOME MONITORING REPORT    INR today:   Results for orders placed or performed in visit on 11/08/23   Protime-INR   Result Value Ref Range    INR 1.90        INR Goal: 2.0-3.0    Dosing Plan  As of 11/8/2023      TTR:  31.6 % (5.5 y)   Full warfarin instructions:  11.25 mg every Sun; 7.5 mg all other days                PLAN: Advised patient/caregiver to increase her dose tonight to 11.25 mg, then continue current dose and recheck in one week. I encouraged her to keep up the good work and take her medication daily.  Patient/Caregiver voiced understanding

## 2023-11-16 ENCOUNTER — ANTI-COAG VISIT (OUTPATIENT)
Dept: PRIMARY CARE CLINIC | Age: 73
End: 2023-11-16

## 2023-11-16 DIAGNOSIS — Z79.01 ANTICOAGULATED ON COUMADIN: Primary | ICD-10-CM

## 2023-11-16 LAB — INR BLD: 2.3

## 2023-11-16 NOTE — PROGRESS NOTES
HOME MONITORING REPORT    INR today:   Results for orders placed or performed in visit on 11/16/23   Protime-INR   Result Value Ref Range    INR 2.30        INR Goal: 2.0-3.0    Dosing Plan  As of 11/16/2023      TTR:  31.7 % (5.5 y)   Full warfarin instructions:  11.25 mg every Sun; 7.5 mg all other days                PLAN: Advised patient/caregiver to continue current dose and recheck in one week.   Patient/Caregiver voiced understanding

## 2023-11-22 ENCOUNTER — ANTI-COAG VISIT (OUTPATIENT)
Dept: PRIMARY CARE CLINIC | Age: 73
End: 2023-11-22
Payer: MEDICARE

## 2023-11-22 DIAGNOSIS — Z79.01 ANTICOAGULATED ON COUMADIN: Primary | ICD-10-CM

## 2023-11-22 LAB — INR BLD: 2

## 2023-11-22 PROCEDURE — 93793 ANTICOAG MGMT PT WARFARIN: CPT | Performed by: INTERNAL MEDICINE

## 2023-11-22 NOTE — PROGRESS NOTES
HOME MONITORING REPORT    INR today:   Results for orders placed or performed in visit on 11/22/23   Protime-INR   Result Value Ref Range    INR 2.00        INR Goal: 2.0-3.0    Dosing Plan  As of 11/22/2023      TTR:  31.9 % (5.5 y)   Full warfarin instructions:  11.25 mg every Sun; 7.5 mg all other days                PLAN: Patient aware to continue current dose and recheck in one week or as ordered.

## 2023-12-04 ENCOUNTER — ANTI-COAG VISIT (OUTPATIENT)
Dept: PRIMARY CARE CLINIC | Age: 73
End: 2023-12-04
Payer: MEDICARE

## 2023-12-04 DIAGNOSIS — Z79.01 ANTICOAGULATED ON COUMADIN: Primary | ICD-10-CM

## 2023-12-04 LAB — INR BLD: 1.9

## 2023-12-04 PROCEDURE — 93793 ANTICOAG MGMT PT WARFARIN: CPT | Performed by: INTERNAL MEDICINE

## 2023-12-09 DIAGNOSIS — N18.30 STAGE 3 CHRONIC KIDNEY DISEASE, UNSPECIFIED WHETHER STAGE 3A OR 3B CKD (HCC): Primary | ICD-10-CM

## 2023-12-09 DIAGNOSIS — K52.9 GASTROENTERITIS: ICD-10-CM

## 2023-12-09 DIAGNOSIS — K21.9 GASTROESOPHAGEAL REFLUX DISEASE WITHOUT ESOPHAGITIS: ICD-10-CM

## 2023-12-12 ENCOUNTER — ANTI-COAG VISIT (OUTPATIENT)
Dept: PRIMARY CARE CLINIC | Age: 73
End: 2023-12-12
Payer: MEDICARE

## 2023-12-12 DIAGNOSIS — Z79.01 ANTICOAGULATED ON COUMADIN: Primary | ICD-10-CM

## 2023-12-12 LAB — INR BLD: 1.1

## 2023-12-12 PROCEDURE — 93793 ANTICOAG MGMT PT WARFARIN: CPT | Performed by: INTERNAL MEDICINE

## 2023-12-12 RX ORDER — CALCITRIOL 0.25 UG/1
0.25 CAPSULE, LIQUID FILLED ORAL DAILY
Qty: 90 CAPSULE | Refills: 1 | Status: SHIPPED | OUTPATIENT
Start: 2023-12-12

## 2023-12-12 RX ORDER — SUCRALFATE 1 G/1
TABLET ORAL
Qty: 360 TABLET | Refills: 1 | Status: SHIPPED | OUTPATIENT
Start: 2023-12-12

## 2023-12-12 NOTE — PROGRESS NOTES
HOME MONITORING REPORT    INR today:   Results for orders placed or performed in visit on 12/12/23   Protime-INR   Result Value Ref Range    INR 1.10        INR Goal: 2.0-3.0    Dosing Plan  As of 12/12/2023      TTR:  31.6 % (5.6 y)   Full warfarin instructions:  11.25 mg every Sun; 7.5 mg all other days              SHE MISSED ALL HER MEDICATIONS FOR ABOUT A WEEK. RESTARTED COUMADIN LAST NIGHT. PLAN: Advised patient/caregiver to continue current dose and recheck next Monday.  .  Patient/Caregiver voiced understanding

## 2024-01-12 ENCOUNTER — OFFICE VISIT (OUTPATIENT)
Dept: INTERNAL MEDICINE | Age: 74
End: 2024-01-12
Payer: MEDICARE

## 2024-01-12 VITALS
HEIGHT: 67 IN | TEMPERATURE: 97.7 F | WEIGHT: 244 LBS | BODY MASS INDEX: 38.3 KG/M2 | OXYGEN SATURATION: 98 % | HEART RATE: 73 BPM

## 2024-01-12 DIAGNOSIS — E11.49 OTHER DIABETIC NEUROLOGICAL COMPLICATION ASSOCIATED WITH TYPE 2 DIABETES MELLITUS (HCC): ICD-10-CM

## 2024-01-12 DIAGNOSIS — E66.01 MORBID OBESITY DUE TO EXCESS CALORIES (HCC): ICD-10-CM

## 2024-01-12 DIAGNOSIS — N18.31 STAGE 3A CHRONIC KIDNEY DISEASE (HCC): ICD-10-CM

## 2024-01-12 DIAGNOSIS — Z23 NEED FOR SHINGLES VACCINE: ICD-10-CM

## 2024-01-12 DIAGNOSIS — F32.89 OTHER DEPRESSION: ICD-10-CM

## 2024-01-12 DIAGNOSIS — Z29.11 NEED FOR PROPHYLACTIC VACCINATION AND INOCULATION AGAINST RESPIRATORY SYNCYTIAL VIRUS (RSV): ICD-10-CM

## 2024-01-12 DIAGNOSIS — R30.0 DYSURIA: ICD-10-CM

## 2024-01-12 DIAGNOSIS — J02.9 SORE THROAT: Primary | ICD-10-CM

## 2024-01-12 DIAGNOSIS — J44.9 CHRONIC OBSTRUCTIVE PULMONARY DISEASE, UNSPECIFIED COPD TYPE (HCC): ICD-10-CM

## 2024-01-12 DIAGNOSIS — R09.89 CHEST CONGESTION: ICD-10-CM

## 2024-01-12 DIAGNOSIS — F33.2 SEVERE EPISODE OF RECURRENT MAJOR DEPRESSIVE DISORDER, WITHOUT PSYCHOTIC FEATURES (HCC): ICD-10-CM

## 2024-01-12 DIAGNOSIS — I49.5 SICK SINUS SYNDROME (HCC): ICD-10-CM

## 2024-01-12 DIAGNOSIS — E11.21 TYPE II DIABETES MELLITUS WITH NEPHROPATHY (HCC): ICD-10-CM

## 2024-01-12 DIAGNOSIS — I10 PRIMARY HYPERTENSION: ICD-10-CM

## 2024-01-12 DIAGNOSIS — I48.0 PAROXYSMAL ATRIAL FIBRILLATION (HCC): ICD-10-CM

## 2024-01-12 DIAGNOSIS — R05.9 COUGH, UNSPECIFIED TYPE: ICD-10-CM

## 2024-01-12 LAB
BACTERIA URNS QL MICRO: ABNORMAL /HPF
BILIRUB UR QL STRIP: NEGATIVE
CLARITY UR: ABNORMAL
COLOR UR: YELLOW
CRYSTALS URNS MICRO: ABNORMAL /HPF
EPI CELLS #/AREA URNS AUTO: 14 /HPF (ref 0–5)
GLUCOSE UR STRIP.AUTO-MCNC: NEGATIVE MG/DL
HGB UR STRIP.AUTO-MCNC: NEGATIVE MG/L
HYALINE CASTS #/AREA URNS AUTO: 3 /HPF (ref 0–8)
INFLUENZA A ANTIGEN, POC: NEGATIVE
INFLUENZA B ANTIGEN, POC: NEGATIVE
KETONES UR STRIP.AUTO-MCNC: NEGATIVE MG/DL
LEUKOCYTE ESTERASE UR QL STRIP.AUTO: ABNORMAL
LOT EXPIRE DATE: NORMAL
LOT KIT NUMBER: NORMAL
NITRITE UR QL STRIP.AUTO: POSITIVE
PH UR STRIP.AUTO: 7 [PH] (ref 5–8)
PROT UR STRIP.AUTO-MCNC: NEGATIVE MG/DL
RBC #/AREA URNS AUTO: 1 /HPF (ref 0–4)
S PYO AG THROAT QL: NORMAL
SARS-COV-2, POC: NORMAL
SP GR UR STRIP.AUTO: 1.02 (ref 1–1.03)
UROBILINOGEN UR STRIP.AUTO-MCNC: 1 E.U./DL
VALID INTERNAL CONTROL: NORMAL
VENDOR AND KIT NAME POC: NORMAL
WBC #/AREA URNS AUTO: 16 /HPF (ref 0–5)

## 2024-01-12 PROCEDURE — 3017F COLORECTAL CA SCREEN DOC REV: CPT | Performed by: INTERNAL MEDICINE

## 2024-01-12 PROCEDURE — G8484 FLU IMMUNIZE NO ADMIN: HCPCS | Performed by: INTERNAL MEDICINE

## 2024-01-12 PROCEDURE — G8399 PT W/DXA RESULTS DOCUMENT: HCPCS | Performed by: INTERNAL MEDICINE

## 2024-01-12 PROCEDURE — G8417 CALC BMI ABV UP PARAM F/U: HCPCS | Performed by: INTERNAL MEDICINE

## 2024-01-12 PROCEDURE — 1090F PRES/ABSN URINE INCON ASSESS: CPT | Performed by: INTERNAL MEDICINE

## 2024-01-12 PROCEDURE — G8427 DOCREV CUR MEDS BY ELIG CLIN: HCPCS | Performed by: INTERNAL MEDICINE

## 2024-01-12 PROCEDURE — 3046F HEMOGLOBIN A1C LEVEL >9.0%: CPT | Performed by: INTERNAL MEDICINE

## 2024-01-12 PROCEDURE — 99214 OFFICE O/P EST MOD 30 MIN: CPT | Performed by: INTERNAL MEDICINE

## 2024-01-12 PROCEDURE — 2022F DILAT RTA XM EVC RTNOPTHY: CPT | Performed by: INTERNAL MEDICINE

## 2024-01-12 PROCEDURE — 1036F TOBACCO NON-USER: CPT | Performed by: INTERNAL MEDICINE

## 2024-01-12 PROCEDURE — 1123F ACP DISCUSS/DSCN MKR DOCD: CPT | Performed by: INTERNAL MEDICINE

## 2024-01-12 PROCEDURE — 3023F SPIROM DOC REV: CPT | Performed by: INTERNAL MEDICINE

## 2024-01-12 RX ORDER — CEFDINIR 300 MG/1
300 CAPSULE ORAL 2 TIMES DAILY
Qty: 20 CAPSULE | Refills: 0 | Status: SHIPPED | OUTPATIENT
Start: 2024-01-12 | End: 2024-01-22

## 2024-01-12 RX ORDER — ZOSTER VACCINE RECOMBINANT, ADJUVANTED 50 MCG/0.5
0.5 KIT INTRAMUSCULAR SEE ADMIN INSTRUCTIONS
Qty: 0.5 ML | Refills: 0 | Status: SHIPPED | OUTPATIENT
Start: 2024-01-12 | End: 2024-07-10

## 2024-01-12 RX ORDER — BUMETANIDE 1 MG/1
1 TABLET ORAL DAILY
Qty: 3 TABLET | Refills: 3 | Status: SHIPPED | OUTPATIENT
Start: 2024-01-12

## 2024-01-12 RX ORDER — LISINOPRIL 10 MG/1
10 TABLET ORAL DAILY
Qty: 90 TABLET | Refills: 3 | Status: SHIPPED | OUTPATIENT
Start: 2024-01-12

## 2024-01-12 NOTE — PROGRESS NOTES
abnormal muscle tone.      Coordination: Coordination normal.      Gait: Gait normal.      Deep Tendon Reflexes: Reflexes normal.   Psychiatric:         Mood and Affect: Mood normal. Mood is not anxious or depressed.         Behavior: Behavior normal.         Thought Content: Thought content normal.         Judgment: Judgment normal.         1. Sore throat        ASSESSMENT/PLAN:    73-year-old woman here for evaluation of pharyngitis/upper respiratory infection    1.  Cough, chest congestion pharyngitis: Flu swab, COVID swab and strep swab are negative.  Omnicef prescribed.    2.  Hypertension: Blood pressure well-controlled on current medication regimen    3.  Stage III chronic kidney disease: Stable.  Continue calcitriol    4.  Prescription for RSV vaccine given to patient today    5.  Prescription for shingles vaccine given to patient today    6.  Diabetic neuropathy: Continue gabapentin as prescribed    7.  Type 2 diabetes: Patient states her blood sugars are running between 120 and 130.  Continue medications as prescribed    8.  Depression: Stable on Lexapro    9.  Atrial fibrillation/SSS: Rate controlled.  She is on Coumadin for stroke prophylaxis.  She does have a pacemaker in place.    10.  COPD: Stable.    11.  Morbid obesity: Carmine iMchele was seen today for other.    Diagnoses and all orders for this visit:    Sore throat  -     POCT rapid strep A  -     POCT COVID-19 & Influenza A/B          No follow-ups on file.     Orders Placed This Encounter   Procedures    POCT rapid strep A    POCT COVID-19 & Influenza A/B     Order Specific Question:   Pregnant?     Answer:   No     Order Specific Question:   Previously tested for COVID-19?     Answer:   Yes       Regina Curtis MD

## 2024-01-14 LAB
BACTERIA UR CULT: ABNORMAL
BACTERIA UR CULT: ABNORMAL
ORGANISM: ABNORMAL

## 2024-01-16 DIAGNOSIS — I10 ESSENTIAL HYPERTENSION: ICD-10-CM

## 2024-01-16 RX ORDER — BUMETANIDE 2 MG/1
2 TABLET ORAL DAILY
Qty: 90 TABLET | Refills: 3 | OUTPATIENT
Start: 2024-01-16

## 2024-01-18 SDOH — ECONOMIC STABILITY: INCOME INSECURITY: HOW HARD IS IT FOR YOU TO PAY FOR THE VERY BASICS LIKE FOOD, HOUSING, MEDICAL CARE, AND HEATING?: NOT HARD AT ALL

## 2024-01-18 SDOH — ECONOMIC STABILITY: FOOD INSECURITY: WITHIN THE PAST 12 MONTHS, THE FOOD YOU BOUGHT JUST DIDN'T LAST AND YOU DIDN'T HAVE MONEY TO GET MORE.: NEVER TRUE

## 2024-01-18 SDOH — ECONOMIC STABILITY: FOOD INSECURITY: WITHIN THE PAST 12 MONTHS, YOU WORRIED THAT YOUR FOOD WOULD RUN OUT BEFORE YOU GOT MONEY TO BUY MORE.: NEVER TRUE

## 2024-01-18 ASSESSMENT — ENCOUNTER SYMPTOMS
NAUSEA: 0
APNEA: 0
SINUS PAIN: 1
SHORTNESS OF BREATH: 0
SORE THROAT: 1
COUGH: 0
ABDOMINAL PAIN: 0
VOMITING: 0
COLOR CHANGE: 0
DIARRHEA: 0
SINUS PRESSURE: 1
RHINORRHEA: 0
ABDOMINAL DISTENTION: 0
TROUBLE SWALLOWING: 0
CONSTIPATION: 0
EYE DISCHARGE: 0
WHEEZING: 0
BLOOD IN STOOL: 0
BACK PAIN: 1
STRIDOR: 0
CHEST TIGHTNESS: 0
VOICE CHANGE: 0
EYE ITCHING: 0

## 2024-01-18 ASSESSMENT — PATIENT HEALTH QUESTIONNAIRE - PHQ9
SUM OF ALL RESPONSES TO PHQ QUESTIONS 1-9: 2
5. POOR APPETITE OR OVEREATING: 0
7. TROUBLE CONCENTRATING ON THINGS, SUCH AS READING THE NEWSPAPER OR WATCHING TELEVISION: 0
6. FEELING BAD ABOUT YOURSELF - OR THAT YOU ARE A FAILURE OR HAVE LET YOURSELF OR YOUR FAMILY DOWN: 0
10. IF YOU CHECKED OFF ANY PROBLEMS, HOW DIFFICULT HAVE THESE PROBLEMS MADE IT FOR YOU TO DO YOUR WORK, TAKE CARE OF THINGS AT HOME, OR GET ALONG WITH OTHER PEOPLE: 0
SUM OF ALL RESPONSES TO PHQ9 QUESTIONS 1 & 2: 2
4. FEELING TIRED OR HAVING LITTLE ENERGY: 0
SUM OF ALL RESPONSES TO PHQ QUESTIONS 1-9: 2
2. FEELING DOWN, DEPRESSED OR HOPELESS: 1
1. LITTLE INTEREST OR PLEASURE IN DOING THINGS: 1
SUM OF ALL RESPONSES TO PHQ QUESTIONS 1-9: 2
9. THOUGHTS THAT YOU WOULD BE BETTER OFF DEAD, OR OF HURTING YOURSELF: 0
3. TROUBLE FALLING OR STAYING ASLEEP: 0
SUM OF ALL RESPONSES TO PHQ QUESTIONS 1-9: 2
8. MOVING OR SPEAKING SO SLOWLY THAT OTHER PEOPLE COULD HAVE NOTICED. OR THE OPPOSITE, BEING SO FIGETY OR RESTLESS THAT YOU HAVE BEEN MOVING AROUND A LOT MORE THAN USUAL: 0

## 2024-01-22 ENCOUNTER — TELEPHONE (OUTPATIENT)
Dept: PRIMARY CARE CLINIC | Age: 74
End: 2024-01-22

## 2024-01-22 NOTE — TELEPHONE ENCOUNTER
Called Leny and asked her why she is not checking her INR. She stated that she has been real busy. I stated that she needed to take 5 minutes and check her INR or they may pull her machine due to Medicare non-compliance. I also reminded her not to enter a buch of crazy made up numbers for the missed weeks as she has done in the past. She voices understanding and agrees to this plan.

## 2024-01-23 ENCOUNTER — ANTI-COAG VISIT (OUTPATIENT)
Dept: PRIMARY CARE CLINIC | Age: 74
End: 2024-01-23
Payer: MEDICARE

## 2024-01-23 DIAGNOSIS — Z79.01 ANTICOAGULATED ON COUMADIN: Primary | ICD-10-CM

## 2024-01-23 LAB — INR BLD: 2

## 2024-01-23 PROCEDURE — 93793 ANTICOAG MGMT PT WARFARIN: CPT | Performed by: INTERNAL MEDICINE

## 2024-01-23 NOTE — PROGRESS NOTES
HOME MONITORING REPORT    INR today:   Results for orders placed or performed in visit on 01/23/24   Protime-INR   Result Value Ref Range    INR 2.00        INR Goal: 2.0-3.0    Dosing Plan  As of 1/23/2024      TTR:  31.0 % (5.7 y)   Full warfarin instructions:  11.25 mg every Sun; 7.5 mg all other days                PLAN: Patient aware to continue current dose and recheck in one week or as ordered.

## 2024-02-03 LAB — INR BLD: 1.2

## 2024-02-05 ENCOUNTER — ANTI-COAG VISIT (OUTPATIENT)
Dept: PRIMARY CARE CLINIC | Age: 74
End: 2024-02-05
Payer: MEDICARE

## 2024-02-05 DIAGNOSIS — Z79.01 ANTICOAGULATED ON COUMADIN: Primary | ICD-10-CM

## 2024-02-05 PROCEDURE — 93793 ANTICOAG MGMT PT WARFARIN: CPT | Performed by: INTERNAL MEDICINE

## 2024-02-05 NOTE — PROGRESS NOTES
[facility] to [facility] 2. [facility]    HOME MONITORING REPORT    INR today:   Results for orders placed or performed in visit on 02/05/24   Protime-INR   Result Value Ref Range    INR 1.20        INR Goal: 2.0-3.0    Dosing Plan  As of 2/5/2024      TTR:  30.8 % (5.7 y)   Full warfarin instructions:  11.25 mg every Sun; 7.5 mg all other days              She has not been taking her medication because she is working herself to death. She took 2 tablets on Saturday. I advised her to take 2 again tonight. She has not been testing either. Today I got 5 results on her at one time. She calls fake results to [facility] so she can get caught up and then sends in her real result. I am not recording those erroneous results in her chart.   PLAN: Recheck in one week.  Patient/Caregiver voiced understanding

## 2024-02-08 ENCOUNTER — HOSPITAL ENCOUNTER (INPATIENT)
Age: 74
LOS: 5 days | Discharge: HOME | DRG: 308 | End: 2024-02-13
Attending: STUDENT IN AN ORGANIZED HEALTH CARE EDUCATION/TRAINING PROGRAM | Admitting: STUDENT IN AN ORGANIZED HEALTH CARE EDUCATION/TRAINING PROGRAM
Payer: MEDICARE

## 2024-02-08 ENCOUNTER — APPOINTMENT (OUTPATIENT)
Dept: GENERAL RADIOLOGY | Age: 74
DRG: 308 | End: 2024-02-08
Payer: MEDICARE

## 2024-02-08 DIAGNOSIS — E87.6 HYPOKALEMIA: Primary | ICD-10-CM

## 2024-02-08 DIAGNOSIS — E87.79 OTHER HYPERVOLEMIA: Primary | ICD-10-CM

## 2024-02-08 DIAGNOSIS — I48.11 LONGSTANDING PERSISTENT ATRIAL FIBRILLATION (HCC): ICD-10-CM

## 2024-02-08 DIAGNOSIS — R07.89 ATYPICAL CHEST PAIN: ICD-10-CM

## 2024-02-08 PROBLEM — I10 HYPERTENSION: Status: ACTIVE | Noted: 2024-02-08

## 2024-02-08 PROBLEM — R79.1 SUBTHERAPEUTIC INTERNATIONAL NORMALIZED RATIO (INR): Status: ACTIVE | Noted: 2024-02-08

## 2024-02-08 PROBLEM — R07.9 CHEST PAIN, UNSPECIFIED: Status: ACTIVE | Noted: 2024-02-08

## 2024-02-08 PROBLEM — I48.91 ATRIAL FIBRILLATION WITH RAPID VENTRICULAR RESPONSE (HCC): Status: ACTIVE | Noted: 2024-02-08

## 2024-02-08 LAB
ALBUMIN SERPL-MCNC: 3.7 G/DL (ref 3.5–5.2)
ALP SERPL-CCNC: 71 U/L (ref 35–104)
ALT SERPL-CCNC: 9 U/L (ref 5–33)
ANION GAP SERPL CALCULATED.3IONS-SCNC: 15 MMOL/L (ref 7–19)
APTT PPP: 28 SEC (ref 26–36.2)
AST SERPL-CCNC: 18 U/L (ref 5–32)
BASOPHILS # BLD: 0 K/UL (ref 0–0.2)
BASOPHILS NFR BLD: 0 % (ref 0–1)
BILIRUB SERPL-MCNC: 0.4 MG/DL (ref 0.2–1.2)
BNP BLD-MCNC: ABNORMAL PG/ML (ref 0–124)
BUN SERPL-MCNC: 25 MG/DL (ref 8–23)
CALCIUM SERPL-MCNC: 9.1 MG/DL (ref 8.8–10.2)
CHLORIDE SERPL-SCNC: 104 MMOL/L (ref 98–111)
CO2 SERPL-SCNC: 20 MMOL/L (ref 22–29)
CREAT SERPL-MCNC: 1.5 MG/DL (ref 0.5–0.9)
D DIMER PPP FEU-MCNC: 12.79 UG/ML FEU (ref 0–0.48)
EOSINOPHIL # BLD: 0 K/UL (ref 0–0.6)
EOSINOPHIL NFR BLD: 0.3 % (ref 0–5)
ERYTHROCYTE [DISTWIDTH] IN BLOOD BY AUTOMATED COUNT: 15.9 % (ref 11.5–14.5)
GLUCOSE BLD-MCNC: 125 MG/DL (ref 70–99)
GLUCOSE SERPL-MCNC: 91 MG/DL (ref 74–109)
HBA1C MFR BLD: 5.6 % (ref 4–6)
HCT VFR BLD AUTO: 37.9 % (ref 37–47)
HGB BLD-MCNC: 12.1 G/DL (ref 12–16)
IMM GRANULOCYTES # BLD: 0 K/UL
INR PPP: 1.35 (ref 0.88–1.18)
LYMPHOCYTES # BLD: 2.3 K/UL (ref 1.1–4.5)
LYMPHOCYTES NFR BLD: 33 % (ref 20–40)
MCH RBC QN AUTO: 29.5 PG (ref 27–31)
MCHC RBC AUTO-ENTMCNC: 31.9 G/DL (ref 33–37)
MCV RBC AUTO: 92.4 FL (ref 81–99)
MONOCYTES # BLD: 0.9 K/UL (ref 0–0.9)
MONOCYTES NFR BLD: 12.4 % (ref 0–10)
NEUTROPHILS # BLD: 3.7 K/UL (ref 1.5–7.5)
NEUTS SEG NFR BLD: 54 % (ref 50–65)
PERFORMED ON: ABNORMAL
PLATELET # BLD AUTO: 166 K/UL (ref 130–400)
PMV BLD AUTO: 11.2 FL (ref 9.4–12.3)
POTASSIUM SERPL-SCNC: 4.3 MMOL/L (ref 3.5–5)
PROT SERPL-MCNC: 7.4 G/DL (ref 6.6–8.7)
PROTHROMBIN TIME: 16.3 SEC (ref 12–14.6)
RBC # BLD AUTO: 4.1 M/UL (ref 4.2–5.4)
SODIUM SERPL-SCNC: 139 MMOL/L (ref 136–145)
TROPONIN, HIGH SENSITIVITY: 34 NG/L (ref 0–14)
TROPONIN, HIGH SENSITIVITY: 37 NG/L (ref 0–14)
TSH SERPL DL<=0.005 MIU/L-ACNC: 2.4 UIU/ML (ref 0.35–5.5)
WBC # BLD AUTO: 6.8 K/UL (ref 4.8–10.8)

## 2024-02-08 PROCEDURE — 36415 COLL VENOUS BLD VENIPUNCTURE: CPT

## 2024-02-08 PROCEDURE — 2500000003 HC RX 250 WO HCPCS

## 2024-02-08 PROCEDURE — 6360000002 HC RX W HCPCS: Performed by: PHYSICIAN ASSISTANT

## 2024-02-08 PROCEDURE — 83880 ASSAY OF NATRIURETIC PEPTIDE: CPT

## 2024-02-08 PROCEDURE — 84484 ASSAY OF TROPONIN QUANT: CPT

## 2024-02-08 PROCEDURE — 2140000000 HC CCU INTERMEDIATE R&B

## 2024-02-08 PROCEDURE — 82962 GLUCOSE BLOOD TEST: CPT

## 2024-02-08 PROCEDURE — 85379 FIBRIN DEGRADATION QUANT: CPT

## 2024-02-08 PROCEDURE — 71045 X-RAY EXAM CHEST 1 VIEW: CPT

## 2024-02-08 PROCEDURE — 99285 EMERGENCY DEPT VISIT HI MDM: CPT

## 2024-02-08 PROCEDURE — 6360000002 HC RX W HCPCS

## 2024-02-08 PROCEDURE — 85025 COMPLETE CBC W/AUTO DIFF WBC: CPT

## 2024-02-08 PROCEDURE — 96374 THER/PROPH/DIAG INJ IV PUSH: CPT

## 2024-02-08 PROCEDURE — 83036 HEMOGLOBIN GLYCOSYLATED A1C: CPT

## 2024-02-08 PROCEDURE — 85610 PROTHROMBIN TIME: CPT

## 2024-02-08 PROCEDURE — 84443 ASSAY THYROID STIM HORMONE: CPT

## 2024-02-08 PROCEDURE — 6370000000 HC RX 637 (ALT 250 FOR IP)

## 2024-02-08 PROCEDURE — 80053 COMPREHEN METABOLIC PANEL: CPT

## 2024-02-08 PROCEDURE — 05HY33Z INSERTION OF INFUSION DEVICE INTO UPPER VEIN, PERCUTANEOUS APPROACH: ICD-10-PCS | Performed by: STUDENT IN AN ORGANIZED HEALTH CARE EDUCATION/TRAINING PROGRAM

## 2024-02-08 PROCEDURE — 85730 THROMBOPLASTIN TIME PARTIAL: CPT

## 2024-02-08 PROCEDURE — 2500000003 HC RX 250 WO HCPCS: Performed by: PHYSICIAN ASSISTANT

## 2024-02-08 RX ORDER — DILTIAZEM HYDROCHLORIDE 5 MG/ML
INJECTION INTRAVENOUS
Status: DISPENSED
Start: 2024-02-08 | End: 2024-02-09

## 2024-02-08 RX ORDER — ASPIRIN 81 MG/1
81 TABLET, CHEWABLE ORAL DAILY
Status: DISCONTINUED | OUTPATIENT
Start: 2024-02-09 | End: 2024-02-13 | Stop reason: HOSPADM

## 2024-02-08 RX ORDER — NITROGLYCERIN 0.4 MG/1
0.4 TABLET SUBLINGUAL EVERY 5 MIN PRN
Status: DISCONTINUED | OUTPATIENT
Start: 2024-02-08 | End: 2024-02-13 | Stop reason: HOSPADM

## 2024-02-08 RX ORDER — POLYETHYLENE GLYCOL 3350 17 G/17G
17 POWDER, FOR SOLUTION ORAL DAILY PRN
Status: DISCONTINUED | OUTPATIENT
Start: 2024-02-08 | End: 2024-02-13 | Stop reason: HOSPADM

## 2024-02-08 RX ORDER — SODIUM CHLORIDE 9 MG/ML
INJECTION, SOLUTION INTRAVENOUS PRN
Status: DISCONTINUED | OUTPATIENT
Start: 2024-02-08 | End: 2024-02-13 | Stop reason: HOSPADM

## 2024-02-08 RX ORDER — DILTIAZEM HYDROCHLORIDE 5 MG/ML
10 INJECTION INTRAVENOUS ONCE
Status: COMPLETED | OUTPATIENT
Start: 2024-02-08 | End: 2024-02-08

## 2024-02-08 RX ORDER — BUMETANIDE 0.25 MG/ML
1 INJECTION INTRAMUSCULAR; INTRAVENOUS ONCE
Status: COMPLETED | OUTPATIENT
Start: 2024-02-08 | End: 2024-02-08

## 2024-02-08 RX ORDER — POTASSIUM CHLORIDE 20 MEQ/1
40 TABLET, EXTENDED RELEASE ORAL PRN
Status: DISCONTINUED | OUTPATIENT
Start: 2024-02-08 | End: 2024-02-13 | Stop reason: HOSPADM

## 2024-02-08 RX ORDER — DILTIAZEM HCL IN NACL,ISO-OSM 125 MG/125
2.5-15 PLASTIC BAG, INJECTION (ML) INTRAVENOUS CONTINUOUS
Status: DISCONTINUED | OUTPATIENT
Start: 2024-02-08 | End: 2024-02-08

## 2024-02-08 RX ORDER — DILTIAZEM HYDROCHLORIDE 5 MG/ML
INJECTION INTRAVENOUS
Status: COMPLETED
Start: 2024-02-08 | End: 2024-02-08

## 2024-02-08 RX ORDER — POTASSIUM CHLORIDE 7.45 MG/ML
10 INJECTION INTRAVENOUS PRN
Status: DISCONTINUED | OUTPATIENT
Start: 2024-02-08 | End: 2024-02-13 | Stop reason: HOSPADM

## 2024-02-08 RX ORDER — ACETAMINOPHEN 650 MG/1
650 SUPPOSITORY RECTAL EVERY 6 HOURS PRN
Status: DISCONTINUED | OUTPATIENT
Start: 2024-02-08 | End: 2024-02-13 | Stop reason: HOSPADM

## 2024-02-08 RX ORDER — SODIUM CHLORIDE 0.9 % (FLUSH) 0.9 %
5-40 SYRINGE (ML) INJECTION EVERY 12 HOURS SCHEDULED
Status: DISCONTINUED | OUTPATIENT
Start: 2024-02-08 | End: 2024-02-13 | Stop reason: HOSPADM

## 2024-02-08 RX ORDER — ATORVASTATIN CALCIUM 40 MG/1
40 TABLET, FILM COATED ORAL NIGHTLY
Status: DISCONTINUED | OUTPATIENT
Start: 2024-02-08 | End: 2024-02-13 | Stop reason: HOSPADM

## 2024-02-08 RX ORDER — MAGNESIUM SULFATE IN WATER 40 MG/ML
2000 INJECTION, SOLUTION INTRAVENOUS PRN
Status: DISCONTINUED | OUTPATIENT
Start: 2024-02-08 | End: 2024-02-13 | Stop reason: HOSPADM

## 2024-02-08 RX ORDER — ENOXAPARIN SODIUM 150 MG/ML
1 INJECTION SUBCUTANEOUS 2 TIMES DAILY
Status: DISCONTINUED | OUTPATIENT
Start: 2024-02-08 | End: 2024-02-11

## 2024-02-08 RX ORDER — POTASSIUM CHLORIDE 750 MG/1
10 TABLET, FILM COATED, EXTENDED RELEASE ORAL DAILY
Qty: 90 TABLET | Refills: 1 | Status: ON HOLD | OUTPATIENT
Start: 2024-02-08

## 2024-02-08 RX ORDER — SODIUM CHLORIDE 0.9 % (FLUSH) 0.9 %
5-40 SYRINGE (ML) INJECTION PRN
Status: DISCONTINUED | OUTPATIENT
Start: 2024-02-08 | End: 2024-02-13 | Stop reason: HOSPADM

## 2024-02-08 RX ORDER — DILTIAZEM HYDROCHLORIDE 5 MG/ML
10 INJECTION INTRAVENOUS ONCE
Status: DISCONTINUED | OUTPATIENT
Start: 2024-02-08 | End: 2024-02-08

## 2024-02-08 RX ORDER — BUMETANIDE 1 MG/1
1 TABLET ORAL DAILY
Qty: 3 TABLET | Refills: 3 | Status: ON HOLD | OUTPATIENT
Start: 2024-02-08

## 2024-02-08 RX ORDER — ACETAMINOPHEN 325 MG/1
650 TABLET ORAL EVERY 6 HOURS PRN
Status: DISCONTINUED | OUTPATIENT
Start: 2024-02-08 | End: 2024-02-13 | Stop reason: HOSPADM

## 2024-02-08 RX ORDER — POTASSIUM CHLORIDE 750 MG/1
10 TABLET, FILM COATED, EXTENDED RELEASE ORAL DAILY
Qty: 90 TABLET | Refills: 1 | OUTPATIENT
Start: 2024-02-08

## 2024-02-08 RX ORDER — ONDANSETRON 4 MG/1
4 TABLET, ORALLY DISINTEGRATING ORAL EVERY 8 HOURS PRN
Status: DISCONTINUED | OUTPATIENT
Start: 2024-02-08 | End: 2024-02-13 | Stop reason: HOSPADM

## 2024-02-08 RX ORDER — ONDANSETRON 2 MG/ML
4 INJECTION INTRAMUSCULAR; INTRAVENOUS EVERY 6 HOURS PRN
Status: DISCONTINUED | OUTPATIENT
Start: 2024-02-08 | End: 2024-02-13 | Stop reason: HOSPADM

## 2024-02-08 RX ADMIN — DILTIAZEM HYDROCHLORIDE 10 MG: 5 INJECTION, SOLUTION INTRAVENOUS at 12:38

## 2024-02-08 RX ADMIN — ENOXAPARIN SODIUM 105 MG: 150 INJECTION SUBCUTANEOUS at 18:10

## 2024-02-08 RX ADMIN — ATORVASTATIN CALCIUM 40 MG: 40 TABLET, FILM COATED ORAL at 21:13

## 2024-02-08 RX ADMIN — BUMETANIDE 1 MG: 0.25 INJECTION INTRAMUSCULAR; INTRAVENOUS at 15:57

## 2024-02-08 RX ADMIN — DILTIAZEM HYDROCHLORIDE 10 MG: 5 INJECTION, SOLUTION INTRAVENOUS at 18:07

## 2024-02-08 RX ADMIN — DILTIAZEM HYDROCHLORIDE 10 MG: 5 INJECTION INTRAVENOUS at 18:07

## 2024-02-08 RX ADMIN — DILTIAZEM HYDROCHLORIDE 30 MG: 30 TABLET, FILM COATED ORAL at 18:10

## 2024-02-08 NOTE — H&P
[facility]      [facility]ist - History & Physical      PCP: Regina Curtis MD    Date of Admission: 2/8/2024    Date of Service: 2/8/2024    Chief Complaint:  Chest pain     History Of Present Illness:   The patient is a 73 y.o. female with Lyons's esophagus, CVA, CKD 3, COPD, type II DM, DVT/PE, PAF on warfarin, SVT, sick sinus syndrome s/p dual-chamber pacemaker 2009, fibromyalgia, GERD, HTN, complaining of chest pain.  Patient states that she has had ongoing midsternal chest pressure with radiation between her shoulder blades, jaw and left arm, dyspnea on minimal exertion, bilateral lower extremity edema, and fatigue since Saturday.  States that she has been trying to limit her activity.  Today told family members that she was feeling and they encouraged her to present to [facility] ER for further evaluation. Currently rates pain 7 out of 10 on pain scale.  She denies fever, chills, nausea, vomiting, abdominal pain.  Of note, patient previously seen by [facility] cardiology August 2023.  At that time stated she had not been taking her medications lisinopril or metoprolol.  However had been discontinued after near syncopal event.  States that at times she does miss her medications.  Workup in ER CXR no acute cardiopulmonary process, right lung base may represent atelectasis versus pneumonitis, creatinine 1.5 BUN 25, troponin 37, BNP 67847.  Patient is admitted to the hospital service with consultation to cardiology and nephrology.  Past Medical History:        Diagnosis Date    Abdominal pain     Abnormal EKG     Acute sinusitis     Acute superficial venous thrombosis of left lower extremity     Allergic reaction to spider bite     Anemia     Anticoagulated     Anticoagulated on Coumadin     Anxiety     Arrhythmia     Asthmatic bronchitis without complication 01/13/2020    Ataxic gait     Lyons's esophagus     Bowel obstruction (HCC)     Burn of abdomen wall, second degree, initial encounter

## 2024-02-08 NOTE — ED NOTES
Unable to obtain Troponin due to limited access. Unable to give Bumex due to line occluding. Updated CHANELLE Holcomb. Will discuss with charge nurse.

## 2024-02-08 NOTE — ED PROVIDER NOTES
[facility]  eMERGENCYdEPARTMENT eNCOUnter      Pt Name: Leny Stone  MRN: [id number]  Birthdate 1950  Date of evaluation: 2/8/2024  Provider:TWAN Jim    CHIEF COMPLAINT       Chief Complaint   Patient presents with    Chest Pain    Shortness of Breath     Pt reports sob since Saturday with chest pain          HISTORY OF PRESENT ILLNESS  (Location/Symptom, Timing/Onset, Context/Setting, Quality, Duration, Modifying Factors, Severity.)   Leny Stone is a 73 y.o. female who presents to the emergency department with complaints of SOA chest pain comes in with afib 130s rate irregular has hx of afib she is on warfarin. Denies fever chills. Hx of blood clots has pacemaker most recent echo October 21 showing EF 55%. Sees dr norris for CKD3. No current cardiologist Dr Garcia retired she had pacemaker interrogated in jan of this year.     HPI    Nursing Notes were reviewed and I agree.    REVIEW OF SYSTEMS    (2-9 systems for level 4, 10 or more for level 5)     Review of Systems   Constitutional:  Negative for activity change, appetite change, chills and fever.   HENT:  Negative for congestion, postnasal drip, rhinorrhea and sore throat.    Eyes:  Negative for photophobia, pain, discharge and visual disturbance.   Respiratory:  Positive for shortness of breath. Negative for apnea and cough.    Cardiovascular:  Positive for chest pain. Negative for leg swelling.   Gastrointestinal:  Negative for abdominal distention, abdominal pain and nausea.   Genitourinary:  Negative for vaginal bleeding.   Musculoskeletal:  Negative for arthralgias, back pain, joint swelling, neck pain and neck stiffness.   Skin:  Negative for color change and rash.   Neurological:  Negative for dizziness, syncope, facial asymmetry and headaches.   Hematological:  Negative for adenopathy. Does not bruise/bleed easily.   Psychiatric/Behavioral:  Negative for agitation, behavioral problems and confusion.         Except as noted above the

## 2024-02-08 NOTE — CARE COORDINATION
Case Management Assessment  Initial Evaluation    Date/Time of Evaluation: 2/8/2024 4:11 PM  Assessment Completed by: Fantasma Shipman    If patient is discharged prior to next notation, then this note serves as note for discharge by case management.    Patient Name: Leny Stone                   YOB: 1950  Diagnosis: Atrial fibrillation with rapid ventricular response (HCC) [I48.91]                   Date / Time: 2/8/2024 10:37 AM    Patient Admission Status: Inpatient   Readmission Risk (Low < 19, Mod (19-27), High > 27): Readmission Risk Score: 14.7    Current PCP: Regina Curtis MD  PCP verified by CM? Yes    Chart Reviewed: Yes      History Provided by: Patient  Patient Orientation: Alert and Oriented    Patient Cognition: Alert    Hospitalization in the last 30 days (Readmission):  No    If yes, Readmission Assessment in  Navigator will be completed.    Advance Directives:      Code Status: Prior   Patient's Primary Decision Maker is: Legal Next of Kin    Primary Decision Maker: Vero Stone - Child - [phone]    Secondary Decision Maker: Leny Pruitt - Parent - [phone]    Secondary Decision Maker: Janine Burgos - Brother/Sister - [phone]    Discharge Planning:    Patient lives with: Family Members Type of Home: Trailer/Mobile Home  Primary Care Giver: Self  Patient Support Systems include: Family Members   Current Financial resources: Medicare  Current community resources: None  Current services prior to admission: C-pap            Current DME:              Type of Home Care services:  None    ADLS  Prior functional level: Independent in ADLs/IADLs  Current functional level: Assistance with the following:, Mobility    PT AM-PAC:   /24  OT AM-PAC:   /24    Family can provide assistance at DC: Yes  Would you like Case Management to discuss the discharge plan with any other family members/significant others, and if so, who? Yes  Plans to Return to Present Housing: Yes  Potential

## 2024-02-09 ENCOUNTER — APPOINTMENT (OUTPATIENT)
Dept: NUCLEAR MEDICINE | Age: 74
DRG: 308 | End: 2024-02-09
Payer: MEDICARE

## 2024-02-09 LAB
ALBUMIN SERPL-MCNC: 3.2 G/DL (ref 3.5–5.2)
ALP SERPL-CCNC: 58 U/L (ref 35–104)
ALT SERPL-CCNC: 9 U/L (ref 5–33)
ANION GAP SERPL CALCULATED.3IONS-SCNC: 12 MMOL/L (ref 7–19)
AST SERPL-CCNC: 26 U/L (ref 5–32)
BILIRUB SERPL-MCNC: 0.3 MG/DL (ref 0.2–1.2)
BILIRUB UR QL STRIP: NEGATIVE
BUN SERPL-MCNC: 26 MG/DL (ref 8–23)
CALCIUM SERPL-MCNC: 8.4 MG/DL (ref 8.8–10.2)
CHLORIDE SERPL-SCNC: 106 MMOL/L (ref 98–111)
CHOLEST SERPL-MCNC: 172 MG/DL (ref 160–199)
CLARITY UR: CLEAR
CO2 SERPL-SCNC: 17 MMOL/L (ref 22–29)
COLOR UR: YELLOW
CREAT SERPL-MCNC: 1.4 MG/DL (ref 0.5–0.9)
CREAT UR-MCNC: 105 MG/DL (ref 28–217)
ERYTHROCYTE [DISTWIDTH] IN BLOOD BY AUTOMATED COUNT: 15.9 % (ref 11.5–14.5)
GLUCOSE BLD-MCNC: 113 MG/DL (ref 70–99)
GLUCOSE BLD-MCNC: 91 MG/DL (ref 70–99)
GLUCOSE BLD-MCNC: 92 MG/DL (ref 70–99)
GLUCOSE BLD-MCNC: 98 MG/DL (ref 70–99)
GLUCOSE SERPL-MCNC: 87 MG/DL (ref 74–109)
GLUCOSE UR STRIP.AUTO-MCNC: NEGATIVE MG/DL
HCT VFR BLD AUTO: 37.3 % (ref 37–47)
HDLC SERPL-MCNC: 73 MG/DL (ref 65–121)
HGB BLD-MCNC: 11.7 G/DL (ref 12–16)
HGB UR STRIP.AUTO-MCNC: NEGATIVE MG/L
INR PPP: 1.69 (ref 0.88–1.18)
KETONES UR STRIP.AUTO-MCNC: NEGATIVE MG/DL
LDLC SERPL CALC-MCNC: 85 MG/DL
LEUKOCYTE ESTERASE UR QL STRIP.AUTO: NEGATIVE
MCH RBC QN AUTO: 28.9 PG (ref 27–31)
MCHC RBC AUTO-ENTMCNC: 31.4 G/DL (ref 33–37)
MCV RBC AUTO: 92.1 FL (ref 81–99)
NITRITE UR QL STRIP.AUTO: NEGATIVE
PERFORMED ON: ABNORMAL
PERFORMED ON: NORMAL
PH UR STRIP.AUTO: 5.5 [PH] (ref 5–8)
PLATELET # BLD AUTO: 151 K/UL (ref 130–400)
PMV BLD AUTO: 11.6 FL (ref 9.4–12.3)
POTASSIUM SERPL-SCNC: 4.3 MMOL/L (ref 3.5–5)
PROT SERPL-MCNC: 6.8 G/DL (ref 6.6–8.7)
PROT UR STRIP.AUTO-MCNC: NEGATIVE MG/DL
PROTHROMBIN TIME: 19.5 SEC (ref 12–14.6)
RBC # BLD AUTO: 4.05 M/UL (ref 4.2–5.4)
SODIUM SERPL-SCNC: 135 MMOL/L (ref 136–145)
SODIUM UR-SCNC: 64 MMOL/L
SP GR UR STRIP.AUTO: 1.01 (ref 1–1.03)
TRIGL SERPL-MCNC: 71 MG/DL (ref 0–149)
UROBILINOGEN UR STRIP.AUTO-MCNC: 0.2 E.U./DL
WBC # BLD AUTO: 6.2 K/UL (ref 4.8–10.8)

## 2024-02-09 PROCEDURE — 85610 PROTHROMBIN TIME: CPT

## 2024-02-09 PROCEDURE — 6370000000 HC RX 637 (ALT 250 FOR IP)

## 2024-02-09 PROCEDURE — 82962 GLUCOSE BLOOD TEST: CPT

## 2024-02-09 PROCEDURE — 85027 COMPLETE CBC AUTOMATED: CPT

## 2024-02-09 PROCEDURE — A9502 TC99M TETROFOSMIN: HCPCS | Performed by: INTERNAL MEDICINE

## 2024-02-09 PROCEDURE — 36415 COLL VENOUS BLD VENIPUNCTURE: CPT

## 2024-02-09 PROCEDURE — 6360000002 HC RX W HCPCS

## 2024-02-09 PROCEDURE — 93016 CV STRESS TEST SUPVJ ONLY: CPT | Performed by: INTERNAL MEDICINE

## 2024-02-09 PROCEDURE — 81003 URINALYSIS AUTO W/O SCOPE: CPT

## 2024-02-09 PROCEDURE — 78452 HT MUSCLE IMAGE SPECT MULT: CPT

## 2024-02-09 PROCEDURE — 6360000002 HC RX W HCPCS: Performed by: INTERNAL MEDICINE

## 2024-02-09 PROCEDURE — 93018 CV STRESS TEST I&R ONLY: CPT | Performed by: INTERNAL MEDICINE

## 2024-02-09 PROCEDURE — 2140000000 HC CCU INTERMEDIATE R&B

## 2024-02-09 PROCEDURE — 93017 CV STRESS TEST TRACING ONLY: CPT

## 2024-02-09 PROCEDURE — 93005 ELECTROCARDIOGRAM TRACING: CPT

## 2024-02-09 PROCEDURE — 6370000000 HC RX 637 (ALT 250 FOR IP): Performed by: INTERNAL MEDICINE

## 2024-02-09 PROCEDURE — 80053 COMPREHEN METABOLIC PANEL: CPT

## 2024-02-09 PROCEDURE — 99223 1ST HOSP IP/OBS HIGH 75: CPT | Performed by: INTERNAL MEDICINE

## 2024-02-09 PROCEDURE — 6370000000 HC RX 637 (ALT 250 FOR IP): Performed by: STUDENT IN AN ORGANIZED HEALTH CARE EDUCATION/TRAINING PROGRAM

## 2024-02-09 PROCEDURE — 93306 TTE W/DOPPLER COMPLETE: CPT

## 2024-02-09 PROCEDURE — 3430000000 HC RX DIAGNOSTIC RADIOPHARMACEUTICAL: Performed by: INTERNAL MEDICINE

## 2024-02-09 PROCEDURE — 82570 ASSAY OF URINE CREATININE: CPT

## 2024-02-09 PROCEDURE — 84300 ASSAY OF URINE SODIUM: CPT

## 2024-02-09 PROCEDURE — 94640 AIRWAY INHALATION TREATMENT: CPT

## 2024-02-09 PROCEDURE — 80061 LIPID PANEL: CPT

## 2024-02-09 PROCEDURE — 94760 N-INVAS EAR/PLS OXIMETRY 1: CPT

## 2024-02-09 PROCEDURE — 78452 HT MUSCLE IMAGE SPECT MULT: CPT | Performed by: INTERNAL MEDICINE

## 2024-02-09 PROCEDURE — 2580000003 HC RX 258

## 2024-02-09 RX ORDER — DOCUSATE SODIUM 100 MG/1
100 CAPSULE, LIQUID FILLED ORAL 2 TIMES DAILY PRN
Status: DISCONTINUED | OUTPATIENT
Start: 2024-02-09 | End: 2024-02-13 | Stop reason: HOSPADM

## 2024-02-09 RX ORDER — LISINOPRIL 10 MG/1
10 TABLET ORAL DAILY
Status: DISCONTINUED | OUTPATIENT
Start: 2024-02-09 | End: 2024-02-13

## 2024-02-09 RX ORDER — ERGOCALCIFEROL 1.25 MG/1
50000 CAPSULE ORAL WEEKLY
Status: ON HOLD | COMMUNITY
End: 2024-02-13 | Stop reason: HOSPADM

## 2024-02-09 RX ORDER — ERGOCALCIFEROL 1.25 MG/1
50000 CAPSULE ORAL WEEKLY
Status: DISCONTINUED | OUTPATIENT
Start: 2024-02-09 | End: 2024-02-13 | Stop reason: HOSPADM

## 2024-02-09 RX ORDER — REGADENOSON 0.08 MG/ML
0.4 INJECTION, SOLUTION INTRAVENOUS
Status: COMPLETED | OUTPATIENT
Start: 2024-02-09 | End: 2024-02-09

## 2024-02-09 RX ORDER — GABAPENTIN 300 MG/1
300 CAPSULE ORAL 3 TIMES DAILY
Status: DISCONTINUED | OUTPATIENT
Start: 2024-02-09 | End: 2024-02-13 | Stop reason: HOSPADM

## 2024-02-09 RX ORDER — BUMETANIDE 0.25 MG/ML
1 INJECTION INTRAMUSCULAR; INTRAVENOUS 2 TIMES DAILY
Status: DISCONTINUED | OUTPATIENT
Start: 2024-02-09 | End: 2024-02-10

## 2024-02-09 RX ORDER — BACLOFEN 10 MG/1
10 TABLET ORAL 3 TIMES DAILY PRN
Status: DISCONTINUED | OUTPATIENT
Start: 2024-02-09 | End: 2024-02-13 | Stop reason: HOSPADM

## 2024-02-09 RX ORDER — MONTELUKAST SODIUM 10 MG/1
10 TABLET ORAL DAILY
Status: DISCONTINUED | OUTPATIENT
Start: 2024-02-09 | End: 2024-02-13 | Stop reason: HOSPADM

## 2024-02-09 RX ORDER — CALCITRIOL 0.25 UG/1
0.25 CAPSULE, LIQUID FILLED ORAL DAILY
Status: DISCONTINUED | OUTPATIENT
Start: 2024-02-09 | End: 2024-02-13 | Stop reason: HOSPADM

## 2024-02-09 RX ORDER — AMIODARONE HYDROCHLORIDE 200 MG/1
200 TABLET ORAL DAILY
Status: DISCONTINUED | OUTPATIENT
Start: 2024-02-09 | End: 2024-02-10

## 2024-02-09 RX ORDER — ESCITALOPRAM OXALATE 10 MG/1
20 TABLET ORAL DAILY
Status: DISCONTINUED | OUTPATIENT
Start: 2024-02-09 | End: 2024-02-13 | Stop reason: HOSPADM

## 2024-02-09 RX ORDER — BUMETANIDE 1 MG/1
1 TABLET ORAL DAILY
Status: DISCONTINUED | OUTPATIENT
Start: 2024-02-09 | End: 2024-02-09 | Stop reason: DRUGHIGH

## 2024-02-09 RX ORDER — FERROUS GLUCONATE 324(37.5)
324 TABLET ORAL 2 TIMES DAILY
Status: DISCONTINUED | OUTPATIENT
Start: 2024-02-09 | End: 2024-02-13 | Stop reason: HOSPADM

## 2024-02-09 RX ORDER — LEVOTHYROXINE SODIUM 0.07 MG/1
75 TABLET ORAL DAILY
Status: DISCONTINUED | OUTPATIENT
Start: 2024-02-09 | End: 2024-02-13 | Stop reason: HOSPADM

## 2024-02-09 RX ORDER — PANTOPRAZOLE SODIUM 40 MG/1
40 TABLET, DELAYED RELEASE ORAL 2 TIMES DAILY WITH MEALS
Status: DISCONTINUED | OUTPATIENT
Start: 2024-02-09 | End: 2024-02-13 | Stop reason: HOSPADM

## 2024-02-09 RX ORDER — SUCRALFATE 1 G/1
1 TABLET ORAL
Status: DISCONTINUED | OUTPATIENT
Start: 2024-02-09 | End: 2024-02-13 | Stop reason: HOSPADM

## 2024-02-09 RX ADMIN — SUCRALFATE 1 G: 1 TABLET ORAL at 21:00

## 2024-02-09 RX ADMIN — LISINOPRIL 10 MG: 10 TABLET ORAL at 13:00

## 2024-02-09 RX ADMIN — Medication 324 MG: at 13:00

## 2024-02-09 RX ADMIN — CALCITRIOL CAPSULES 0.25 MCG 0.25 MCG: 0.25 CAPSULE ORAL at 13:00

## 2024-02-09 RX ADMIN — MONTELUKAST 10 MG: 10 TABLET, FILM COATED ORAL at 13:00

## 2024-02-09 RX ADMIN — AMIODARONE HYDROCHLORIDE 200 MG: 200 TABLET ORAL at 13:00

## 2024-02-09 RX ADMIN — BUMETANIDE 1 MG: 0.25 INJECTION INTRAMUSCULAR; INTRAVENOUS at 13:00

## 2024-02-09 RX ADMIN — ATORVASTATIN CALCIUM 40 MG: 40 TABLET, FILM COATED ORAL at 21:00

## 2024-02-09 RX ADMIN — PANTOPRAZOLE SODIUM 40 MG: 40 TABLET, DELAYED RELEASE ORAL at 13:00

## 2024-02-09 RX ADMIN — PANTOPRAZOLE SODIUM 40 MG: 40 TABLET, DELAYED RELEASE ORAL at 17:13

## 2024-02-09 RX ADMIN — TIOTROPIUM BROMIDE INHALATION SPRAY 2 PUFF: 3.12 SPRAY, METERED RESPIRATORY (INHALATION) at 11:09

## 2024-02-09 RX ADMIN — ENOXAPARIN SODIUM 105 MG: 150 INJECTION SUBCUTANEOUS at 09:17

## 2024-02-09 RX ADMIN — LEVOTHYROXINE SODIUM 75 MCG: 75 TABLET ORAL at 13:01

## 2024-02-09 RX ADMIN — GABAPENTIN 300 MG: 300 CAPSULE ORAL at 12:59

## 2024-02-09 RX ADMIN — Medication 324 MG: at 21:00

## 2024-02-09 RX ADMIN — ENOXAPARIN SODIUM 105 MG: 150 INJECTION SUBCUTANEOUS at 21:00

## 2024-02-09 RX ADMIN — TETROFOSMIN 8 MILLICURIE: 1.38 INJECTION, POWDER, LYOPHILIZED, FOR SOLUTION INTRAVENOUS at 12:42

## 2024-02-09 RX ADMIN — DILTIAZEM HYDROCHLORIDE 30 MG: 30 TABLET, FILM COATED ORAL at 12:59

## 2024-02-09 RX ADMIN — REGADENOSON 0.4 MG: 0.08 INJECTION, SOLUTION INTRAVENOUS at 11:51

## 2024-02-09 RX ADMIN — GABAPENTIN 300 MG: 300 CAPSULE ORAL at 21:00

## 2024-02-09 RX ADMIN — DILTIAZEM HYDROCHLORIDE 30 MG: 30 TABLET, FILM COATED ORAL at 17:12

## 2024-02-09 RX ADMIN — WARFARIN SODIUM 8 MG: 5 TABLET ORAL at 17:12

## 2024-02-09 RX ADMIN — SODIUM CHLORIDE, PRESERVATIVE FREE 10 ML: 5 INJECTION INTRAVENOUS at 21:00

## 2024-02-09 RX ADMIN — SODIUM CHLORIDE, PRESERVATIVE FREE 10 ML: 5 INJECTION INTRAVENOUS at 09:18

## 2024-02-09 RX ADMIN — ESCITALOPRAM OXALATE 20 MG: 10 TABLET ORAL at 13:00

## 2024-02-09 RX ADMIN — TETROFOSMIN 24 MILLICURIE: 1.38 INJECTION, POWDER, LYOPHILIZED, FOR SOLUTION INTRAVENOUS at 12:42

## 2024-02-09 RX ADMIN — DILTIAZEM HYDROCHLORIDE 30 MG: 30 TABLET, FILM COATED ORAL at 05:14

## 2024-02-09 RX ADMIN — DILTIAZEM HYDROCHLORIDE 30 MG: 30 TABLET, FILM COATED ORAL at 00:05

## 2024-02-09 RX ADMIN — ASPIRIN 81 MG: 81 TABLET, CHEWABLE ORAL at 09:17

## 2024-02-09 RX ADMIN — SUCRALFATE 1 G: 1 TABLET ORAL at 17:12

## 2024-02-09 RX ADMIN — SUCRALFATE 1 G: 1 TABLET ORAL at 12:59

## 2024-02-09 RX ADMIN — BUMETANIDE 1 MG: 0.25 INJECTION INTRAMUSCULAR; INTRAVENOUS at 21:00

## 2024-02-09 RX ADMIN — METOPROLOL TARTRATE 25 MG: 25 TABLET, FILM COATED ORAL at 21:00

## 2024-02-09 RX ADMIN — METOPROLOL TARTRATE 25 MG: 25 TABLET, FILM COATED ORAL at 13:00

## 2024-02-09 RX ADMIN — ERGOCALCIFEROL 50000 UNITS: 1.25 CAPSULE ORAL at 13:00

## 2024-02-09 NOTE — ACP (ADVANCE CARE PLANNING)
Advance Care Planning     Advance Care Planning Activator (Inpatient)  Conversation Note      Date of ACP Conversation: 2/9/2024     Conversation Conducted with: Patient with Decision Making Capacity    ACP Activator: Daniela Izaguirre RN        Health Care Decision Maker:     Current Designated Health Care Decision Maker:     Primary Decision Maker: Vero Stone - Child - [phone]    Secondary Decision Maker: Leny Pruitt - Parent - [phone]    Secondary Decision Maker: Rocky Burgosa - Brother/Sister - [phone]    Secondary Decision Maker: IanShayy Olsen - Grandchild - [phone]    Care Preferences    Ventilation:  \"If you were in your present state of health and suddenly became very ill and were unable to breathe on your own, what would your preference be about the use of a ventilator (breathing machine) if it were available to you?\"      Would the patient desire the use of ventilator (breathing machine)?:     YES      Resuscitation  \"CPR works best to restart the heart when there is a sudden event, like a heart attack, in someone who is otherwise healthy. Unfortunately, CPR does not typically restart the heart for people who have serious health conditions or who are very sick.\"    \"In the event your heart stopped as a result of an underlying serious health condition, would you want attempts to be made to restart your heart (answer \"yes\" for attempt to resuscitate) or would you prefer a natural death (answer \"no\" for do not attempt to resuscitate)?\"    YES          Conversation Outcomes:  ACP discussion completed    Follow-up plan:    [x] Schedule follow-up conversation to continue planning.     Pt has living will documents at bedside to review.

## 2024-02-09 NOTE — PLAN OF CARE
Problem: Discharge Planning  Goal: Discharge to home or other facility with appropriate resources  Outcome: Progressing  Flowsheets (Taken 2/8/2024 1810 by Marilu Souza RN)  Discharge to home or other facility with appropriate resources:   Identify barriers to discharge with patient and caregiver   Arrange for needed discharge resources and transportation as appropriate   Identify discharge learning needs (meds, wound care, etc)   Arrange for interpreters to assist at discharge as needed   Refer to discharge planning if patient needs post-hospital services based on physician order or complex needs related to functional status, cognitive ability or social support system     Problem: Pain  Goal: Verbalizes/displays adequate comfort level or baseline comfort level  Outcome: Progressing

## 2024-02-10 LAB
ALBUMIN SERPL-MCNC: 3.8 G/DL (ref 3.5–5.2)
ALP SERPL-CCNC: 72 U/L (ref 35–104)
ALT SERPL-CCNC: 10 U/L (ref 5–33)
ANION GAP SERPL CALCULATED.3IONS-SCNC: 14 MMOL/L (ref 7–19)
AST SERPL-CCNC: 20 U/L (ref 5–32)
BILIRUB SERPL-MCNC: <0.2 MG/DL (ref 0.2–1.2)
BUN SERPL-MCNC: 26 MG/DL (ref 8–23)
CALCIUM SERPL-MCNC: 8.9 MG/DL (ref 8.8–10.2)
CHLORIDE SERPL-SCNC: 106 MMOL/L (ref 98–111)
CK SERPL-CCNC: 171 U/L (ref 26–192)
CO2 SERPL-SCNC: 19 MMOL/L (ref 22–29)
CREAT SERPL-MCNC: 1.7 MG/DL (ref 0.5–0.9)
ERYTHROCYTE [DISTWIDTH] IN BLOOD BY AUTOMATED COUNT: 16.1 % (ref 11.5–14.5)
GLUCOSE BLD-MCNC: 117 MG/DL (ref 70–99)
GLUCOSE BLD-MCNC: 90 MG/DL (ref 70–99)
GLUCOSE BLD-MCNC: 93 MG/DL (ref 70–99)
GLUCOSE BLD-MCNC: 95 MG/DL (ref 70–99)
GLUCOSE SERPL-MCNC: 152 MG/DL (ref 74–109)
HCT VFR BLD AUTO: 37.5 % (ref 37–47)
HGB BLD-MCNC: 12.2 G/DL (ref 12–16)
INR PPP: 1.87 (ref 0.88–1.18)
MCH RBC QN AUTO: 29.6 PG (ref 27–31)
MCHC RBC AUTO-ENTMCNC: 32.5 G/DL (ref 33–37)
MCV RBC AUTO: 91 FL (ref 81–99)
PERFORMED ON: ABNORMAL
PERFORMED ON: NORMAL
PLATELET # BLD AUTO: 150 K/UL (ref 130–400)
PMV BLD AUTO: 11.9 FL (ref 9.4–12.3)
POTASSIUM SERPL-SCNC: 3.6 MMOL/L (ref 3.5–5)
PROT SERPL-MCNC: 7.3 G/DL (ref 6.6–8.7)
PROTHROMBIN TIME: 21 SEC (ref 12–14.6)
PTH-INTACT SERPL-MCNC: 106.9 PG/ML (ref 15–65)
RBC # BLD AUTO: 4.12 M/UL (ref 4.2–5.4)
SODIUM SERPL-SCNC: 139 MMOL/L (ref 136–145)
TROPONIN, HIGH SENSITIVITY: 29 NG/L (ref 0–14)
URATE SERPL-MCNC: 7.9 MG/DL (ref 2.4–5.7)
WBC # BLD AUTO: 5.3 K/UL (ref 4.8–10.8)

## 2024-02-10 PROCEDURE — 2580000003 HC RX 258

## 2024-02-10 PROCEDURE — 94760 N-INVAS EAR/PLS OXIMETRY 1: CPT

## 2024-02-10 PROCEDURE — 80053 COMPREHEN METABOLIC PANEL: CPT

## 2024-02-10 PROCEDURE — 6370000000 HC RX 637 (ALT 250 FOR IP): Performed by: STUDENT IN AN ORGANIZED HEALTH CARE EDUCATION/TRAINING PROGRAM

## 2024-02-10 PROCEDURE — 85610 PROTHROMBIN TIME: CPT

## 2024-02-10 PROCEDURE — 6370000000 HC RX 637 (ALT 250 FOR IP)

## 2024-02-10 PROCEDURE — 6360000002 HC RX W HCPCS: Performed by: INTERNAL MEDICINE

## 2024-02-10 PROCEDURE — 99232 SBSQ HOSP IP/OBS MODERATE 35: CPT | Performed by: INTERNAL MEDICINE

## 2024-02-10 PROCEDURE — 6360000002 HC RX W HCPCS

## 2024-02-10 PROCEDURE — 82962 GLUCOSE BLOOD TEST: CPT

## 2024-02-10 PROCEDURE — 2140000000 HC CCU INTERMEDIATE R&B

## 2024-02-10 PROCEDURE — 85027 COMPLETE CBC AUTOMATED: CPT

## 2024-02-10 PROCEDURE — 82550 ASSAY OF CK (CPK): CPT

## 2024-02-10 PROCEDURE — 36415 COLL VENOUS BLD VENIPUNCTURE: CPT

## 2024-02-10 PROCEDURE — 84484 ASSAY OF TROPONIN QUANT: CPT

## 2024-02-10 PROCEDURE — 93005 ELECTROCARDIOGRAM TRACING: CPT

## 2024-02-10 PROCEDURE — 84550 ASSAY OF BLOOD/URIC ACID: CPT

## 2024-02-10 PROCEDURE — 83970 ASSAY OF PARATHORMONE: CPT

## 2024-02-10 PROCEDURE — 6370000000 HC RX 637 (ALT 250 FOR IP): Performed by: INTERNAL MEDICINE

## 2024-02-10 PROCEDURE — 97161 PT EVAL LOW COMPLEX 20 MIN: CPT

## 2024-02-10 PROCEDURE — 97116 GAIT TRAINING THERAPY: CPT

## 2024-02-10 PROCEDURE — 94640 AIRWAY INHALATION TREATMENT: CPT

## 2024-02-10 RX ORDER — AMIODARONE HYDROCHLORIDE 200 MG/1
200 TABLET ORAL 2 TIMES DAILY
Status: DISCONTINUED | OUTPATIENT
Start: 2024-02-10 | End: 2024-02-13 | Stop reason: HOSPADM

## 2024-02-10 RX ORDER — BUMETANIDE 0.25 MG/ML
0.5 INJECTION INTRAMUSCULAR; INTRAVENOUS 2 TIMES DAILY
Status: DISCONTINUED | OUTPATIENT
Start: 2024-02-10 | End: 2024-02-11

## 2024-02-10 RX ADMIN — GABAPENTIN 300 MG: 300 CAPSULE ORAL at 20:52

## 2024-02-10 RX ADMIN — SUCRALFATE 1 G: 1 TABLET ORAL at 17:00

## 2024-02-10 RX ADMIN — ATORVASTATIN CALCIUM 40 MG: 40 TABLET, FILM COATED ORAL at 20:52

## 2024-02-10 RX ADMIN — ENOXAPARIN SODIUM 105 MG: 150 INJECTION SUBCUTANEOUS at 20:52

## 2024-02-10 RX ADMIN — WARFARIN SODIUM 8 MG: 5 TABLET ORAL at 17:00

## 2024-02-10 RX ADMIN — DILTIAZEM HYDROCHLORIDE 30 MG: 30 TABLET, FILM COATED ORAL at 17:00

## 2024-02-10 RX ADMIN — DILTIAZEM HYDROCHLORIDE 30 MG: 30 TABLET, FILM COATED ORAL at 05:55

## 2024-02-10 RX ADMIN — SUCRALFATE 1 G: 1 TABLET ORAL at 05:55

## 2024-02-10 RX ADMIN — SODIUM CHLORIDE, PRESERVATIVE FREE 10 ML: 5 INJECTION INTRAVENOUS at 20:52

## 2024-02-10 RX ADMIN — DILTIAZEM HYDROCHLORIDE 30 MG: 30 TABLET, FILM COATED ORAL at 01:00

## 2024-02-10 RX ADMIN — Medication 324 MG: at 09:09

## 2024-02-10 RX ADMIN — CALCITRIOL CAPSULES 0.25 MCG 0.25 MCG: 0.25 CAPSULE ORAL at 09:09

## 2024-02-10 RX ADMIN — AMIODARONE HYDROCHLORIDE 200 MG: 200 TABLET ORAL at 20:52

## 2024-02-10 RX ADMIN — DILTIAZEM HYDROCHLORIDE 30 MG: 30 TABLET, FILM COATED ORAL at 11:36

## 2024-02-10 RX ADMIN — SODIUM CHLORIDE, PRESERVATIVE FREE 10 ML: 5 INJECTION INTRAVENOUS at 09:09

## 2024-02-10 RX ADMIN — SUCRALFATE 1 G: 1 TABLET ORAL at 11:36

## 2024-02-10 RX ADMIN — ESCITALOPRAM OXALATE 20 MG: 10 TABLET ORAL at 09:09

## 2024-02-10 RX ADMIN — Medication 324 MG: at 20:52

## 2024-02-10 RX ADMIN — AMIODARONE HYDROCHLORIDE 200 MG: 200 TABLET ORAL at 09:09

## 2024-02-10 RX ADMIN — PANTOPRAZOLE SODIUM 40 MG: 40 TABLET, DELAYED RELEASE ORAL at 17:00

## 2024-02-10 RX ADMIN — METOPROLOL TARTRATE 25 MG: 25 TABLET, FILM COATED ORAL at 09:09

## 2024-02-10 RX ADMIN — SUCRALFATE 1 G: 1 TABLET ORAL at 20:52

## 2024-02-10 RX ADMIN — ENOXAPARIN SODIUM 105 MG: 150 INJECTION SUBCUTANEOUS at 09:09

## 2024-02-10 RX ADMIN — METOPROLOL TARTRATE 25 MG: 25 TABLET, FILM COATED ORAL at 20:52

## 2024-02-10 RX ADMIN — BUMETANIDE 1 MG: 0.25 INJECTION INTRAMUSCULAR; INTRAVENOUS at 09:08

## 2024-02-10 RX ADMIN — BUMETANIDE 0.5 MG: 0.25 INJECTION INTRAMUSCULAR; INTRAVENOUS at 20:52

## 2024-02-10 RX ADMIN — GABAPENTIN 300 MG: 300 CAPSULE ORAL at 09:09

## 2024-02-10 RX ADMIN — TIOTROPIUM BROMIDE INHALATION SPRAY 2 PUFF: 3.12 SPRAY, METERED RESPIRATORY (INHALATION) at 07:17

## 2024-02-10 RX ADMIN — PANTOPRAZOLE SODIUM 40 MG: 40 TABLET, DELAYED RELEASE ORAL at 09:09

## 2024-02-10 RX ADMIN — MONTELUKAST 10 MG: 10 TABLET, FILM COATED ORAL at 09:09

## 2024-02-10 RX ADMIN — LISINOPRIL 10 MG: 10 TABLET ORAL at 09:09

## 2024-02-10 RX ADMIN — LEVOTHYROXINE SODIUM 75 MCG: 75 TABLET ORAL at 05:55

## 2024-02-10 RX ADMIN — GABAPENTIN 300 MG: 300 CAPSULE ORAL at 14:12

## 2024-02-10 RX ADMIN — ASPIRIN 81 MG: 81 TABLET, CHEWABLE ORAL at 09:09

## 2024-02-11 LAB
ALBUMIN SERPL-MCNC: 3.3 G/DL (ref 3.5–5.2)
ALP SERPL-CCNC: 68 U/L (ref 35–104)
ALT SERPL-CCNC: 8 U/L (ref 5–33)
ANION GAP SERPL CALCULATED.3IONS-SCNC: 13 MMOL/L (ref 7–19)
AST SERPL-CCNC: 17 U/L (ref 5–32)
BILIRUB SERPL-MCNC: <0.2 MG/DL (ref 0.2–1.2)
BUN SERPL-MCNC: 33 MG/DL (ref 8–23)
CALCIUM SERPL-MCNC: 8.6 MG/DL (ref 8.8–10.2)
CHLORIDE SERPL-SCNC: 104 MMOL/L (ref 98–111)
CO2 SERPL-SCNC: 18 MMOL/L (ref 22–29)
CREAT SERPL-MCNC: 1.9 MG/DL (ref 0.5–0.9)
EKG P AXIS: NORMAL DEGREES
EKG P-R INTERVAL: NORMAL MS
EKG Q-T INTERVAL: 350 MS
EKG QRS DURATION: 102 MS
EKG QTC CALCULATION (BAZETT): 427 MS
EKG T AXIS: 132 DEGREES
ERYTHROCYTE [DISTWIDTH] IN BLOOD BY AUTOMATED COUNT: 16.1 % (ref 11.5–14.5)
GLUCOSE BLD-MCNC: 80 MG/DL (ref 70–99)
GLUCOSE BLD-MCNC: 90 MG/DL (ref 70–99)
GLUCOSE BLD-MCNC: 91 MG/DL (ref 70–99)
GLUCOSE BLD-MCNC: 92 MG/DL (ref 70–99)
GLUCOSE SERPL-MCNC: 93 MG/DL (ref 74–109)
HCT VFR BLD AUTO: 37.6 % (ref 37–47)
HGB BLD-MCNC: 11.9 G/DL (ref 12–16)
INR PPP: 2.15 (ref 0.88–1.18)
MCH RBC QN AUTO: 29.5 PG (ref 27–31)
MCHC RBC AUTO-ENTMCNC: 31.6 G/DL (ref 33–37)
MCV RBC AUTO: 93.3 FL (ref 81–99)
PERFORMED ON: NORMAL
PLATELET # BLD AUTO: 152 K/UL (ref 130–400)
PMV BLD AUTO: 11.2 FL (ref 9.4–12.3)
POTASSIUM SERPL-SCNC: 3.8 MMOL/L (ref 3.5–5)
PROT SERPL-MCNC: 6.7 G/DL (ref 6.6–8.7)
PROTHROMBIN TIME: 23.4 SEC (ref 12–14.6)
RBC # BLD AUTO: 4.03 M/UL (ref 4.2–5.4)
SODIUM SERPL-SCNC: 135 MMOL/L (ref 136–145)
WBC # BLD AUTO: 5.8 K/UL (ref 4.8–10.8)

## 2024-02-11 PROCEDURE — 85027 COMPLETE CBC AUTOMATED: CPT

## 2024-02-11 PROCEDURE — 6360000002 HC RX W HCPCS: Performed by: INTERNAL MEDICINE

## 2024-02-11 PROCEDURE — 6370000000 HC RX 637 (ALT 250 FOR IP): Performed by: STUDENT IN AN ORGANIZED HEALTH CARE EDUCATION/TRAINING PROGRAM

## 2024-02-11 PROCEDURE — 2580000003 HC RX 258

## 2024-02-11 PROCEDURE — 2140000000 HC CCU INTERMEDIATE R&B

## 2024-02-11 PROCEDURE — 36415 COLL VENOUS BLD VENIPUNCTURE: CPT

## 2024-02-11 PROCEDURE — 80053 COMPREHEN METABOLIC PANEL: CPT

## 2024-02-11 PROCEDURE — 85610 PROTHROMBIN TIME: CPT

## 2024-02-11 PROCEDURE — 6370000000 HC RX 637 (ALT 250 FOR IP)

## 2024-02-11 PROCEDURE — 93010 ELECTROCARDIOGRAM REPORT: CPT | Performed by: INTERNAL MEDICINE

## 2024-02-11 PROCEDURE — 82962 GLUCOSE BLOOD TEST: CPT

## 2024-02-11 PROCEDURE — 6370000000 HC RX 637 (ALT 250 FOR IP): Performed by: INTERNAL MEDICINE

## 2024-02-11 PROCEDURE — 94760 N-INVAS EAR/PLS OXIMETRY 1: CPT

## 2024-02-11 PROCEDURE — 94640 AIRWAY INHALATION TREATMENT: CPT

## 2024-02-11 PROCEDURE — 6360000002 HC RX W HCPCS

## 2024-02-11 PROCEDURE — 99232 SBSQ HOSP IP/OBS MODERATE 35: CPT | Performed by: INTERNAL MEDICINE

## 2024-02-11 PROCEDURE — 93005 ELECTROCARDIOGRAM TRACING: CPT

## 2024-02-11 RX ADMIN — SODIUM CHLORIDE, PRESERVATIVE FREE 10 ML: 5 INJECTION INTRAVENOUS at 08:35

## 2024-02-11 RX ADMIN — ESCITALOPRAM OXALATE 20 MG: 10 TABLET ORAL at 08:35

## 2024-02-11 RX ADMIN — PANTOPRAZOLE SODIUM 40 MG: 40 TABLET, DELAYED RELEASE ORAL at 08:35

## 2024-02-11 RX ADMIN — DILTIAZEM HYDROCHLORIDE 30 MG: 30 TABLET, FILM COATED ORAL at 00:08

## 2024-02-11 RX ADMIN — SODIUM CHLORIDE, PRESERVATIVE FREE 10 ML: 5 INJECTION INTRAVENOUS at 21:57

## 2024-02-11 RX ADMIN — DILTIAZEM HYDROCHLORIDE 30 MG: 30 TABLET, FILM COATED ORAL at 05:17

## 2024-02-11 RX ADMIN — AMIODARONE HYDROCHLORIDE 200 MG: 200 TABLET ORAL at 21:58

## 2024-02-11 RX ADMIN — GABAPENTIN 300 MG: 300 CAPSULE ORAL at 21:58

## 2024-02-11 RX ADMIN — BUMETANIDE 0.5 MG: 0.25 INJECTION INTRAMUSCULAR; INTRAVENOUS at 08:35

## 2024-02-11 RX ADMIN — METOPROLOL TARTRATE 25 MG: 25 TABLET, FILM COATED ORAL at 21:59

## 2024-02-11 RX ADMIN — GABAPENTIN 300 MG: 300 CAPSULE ORAL at 08:34

## 2024-02-11 RX ADMIN — AMIODARONE HYDROCHLORIDE 200 MG: 200 TABLET ORAL at 08:35

## 2024-02-11 RX ADMIN — TIOTROPIUM BROMIDE INHALATION SPRAY 2 PUFF: 3.12 SPRAY, METERED RESPIRATORY (INHALATION) at 07:13

## 2024-02-11 RX ADMIN — SUCRALFATE 1 G: 1 TABLET ORAL at 17:50

## 2024-02-11 RX ADMIN — LISINOPRIL 10 MG: 10 TABLET ORAL at 08:35

## 2024-02-11 RX ADMIN — DILTIAZEM HYDROCHLORIDE 30 MG: 30 TABLET, FILM COATED ORAL at 11:54

## 2024-02-11 RX ADMIN — ATORVASTATIN CALCIUM 40 MG: 40 TABLET, FILM COATED ORAL at 21:58

## 2024-02-11 RX ADMIN — CALCITRIOL CAPSULES 0.25 MCG 0.25 MCG: 0.25 CAPSULE ORAL at 08:34

## 2024-02-11 RX ADMIN — PANTOPRAZOLE SODIUM 40 MG: 40 TABLET, DELAYED RELEASE ORAL at 17:50

## 2024-02-11 RX ADMIN — ASPIRIN 81 MG: 81 TABLET, CHEWABLE ORAL at 08:35

## 2024-02-11 RX ADMIN — Medication 324 MG: at 08:34

## 2024-02-11 RX ADMIN — DILTIAZEM HYDROCHLORIDE 30 MG: 30 TABLET, FILM COATED ORAL at 17:50

## 2024-02-11 RX ADMIN — WARFARIN SODIUM 11 MG: 5 TABLET ORAL at 17:50

## 2024-02-11 RX ADMIN — SUCRALFATE 1 G: 1 TABLET ORAL at 11:54

## 2024-02-11 RX ADMIN — MONTELUKAST 10 MG: 10 TABLET, FILM COATED ORAL at 08:34

## 2024-02-11 RX ADMIN — ENOXAPARIN SODIUM 105 MG: 150 INJECTION SUBCUTANEOUS at 08:35

## 2024-02-11 RX ADMIN — Medication 324 MG: at 21:58

## 2024-02-11 RX ADMIN — METOPROLOL TARTRATE 25 MG: 25 TABLET, FILM COATED ORAL at 08:35

## 2024-02-11 RX ADMIN — SUCRALFATE 1 G: 1 TABLET ORAL at 05:17

## 2024-02-11 RX ADMIN — LEVOTHYROXINE SODIUM 75 MCG: 75 TABLET ORAL at 05:17

## 2024-02-11 RX ADMIN — SUCRALFATE 1 G: 1 TABLET ORAL at 21:58

## 2024-02-12 ENCOUNTER — APPOINTMENT (OUTPATIENT)
Dept: CARDIAC CATH/INVASIVE PROCEDURES | Age: 74
DRG: 308 | End: 2024-02-12
Payer: MEDICARE

## 2024-02-12 LAB
ANION GAP SERPL CALCULATED.3IONS-SCNC: 14 MMOL/L (ref 7–19)
BUN SERPL-MCNC: 40 MG/DL (ref 8–23)
CALCIUM SERPL-MCNC: 8.3 MG/DL (ref 8.8–10.2)
CHLORIDE SERPL-SCNC: 108 MMOL/L (ref 98–111)
CO2 SERPL-SCNC: 17 MMOL/L (ref 22–29)
CREAT SERPL-MCNC: 1.8 MG/DL (ref 0.5–0.9)
CREAT UR-MCNC: 130.5 MG/DL (ref 28–217)
EKG P AXIS: NORMAL DEGREES
EKG P-R INTERVAL: NORMAL MS
EKG Q-T INTERVAL: 402 MS
EKG Q-T INTERVAL: 436 MS
EKG Q-T INTERVAL: 438 MS
EKG QRS DURATION: 110 MS
EKG QRS DURATION: 124 MS
EKG QRS DURATION: 130 MS
EKG QTC CALCULATION (BAZETT): 447 MS
EKG QTC CALCULATION (BAZETT): 462 MS
EKG QTC CALCULATION (BAZETT): 466 MS
EKG T AXIS: 109 DEGREES
EKG T AXIS: 16 DEGREES
EKG T AXIS: 27 DEGREES
GLUCOSE BLD-MCNC: 109 MG/DL (ref 70–99)
GLUCOSE BLD-MCNC: 66 MG/DL (ref 70–99)
GLUCOSE BLD-MCNC: 79 MG/DL (ref 70–99)
GLUCOSE BLD-MCNC: 79 MG/DL (ref 70–99)
GLUCOSE BLD-MCNC: 92 MG/DL (ref 70–99)
GLUCOSE SERPL-MCNC: 88 MG/DL (ref 74–109)
INR PPP: 2.4 (ref 0.88–1.18)
MICROALBUMIN UR-MCNC: <1.2 MG/DL (ref 0–19)
MICROALBUMIN/CREAT UR-RTO: NORMAL MG/G
PERFORMED ON: ABNORMAL
PERFORMED ON: ABNORMAL
PERFORMED ON: NORMAL
POTASSIUM SERPL-SCNC: 4.3 MMOL/L (ref 3.5–5)
PROTHROMBIN TIME: 25.5 SEC (ref 12–14.6)
SODIUM SERPL-SCNC: 139 MMOL/L (ref 136–145)

## 2024-02-12 PROCEDURE — 94760 N-INVAS EAR/PLS OXIMETRY 1: CPT

## 2024-02-12 PROCEDURE — 94640 AIRWAY INHALATION TREATMENT: CPT

## 2024-02-12 PROCEDURE — 5A2204Z RESTORATION OF CARDIAC RHYTHM, SINGLE: ICD-10-PCS | Performed by: INTERNAL MEDICINE

## 2024-02-12 PROCEDURE — 2709999900 HC NON-CHARGEABLE SUPPLY

## 2024-02-12 PROCEDURE — 82043 UR ALBUMIN QUANTITATIVE: CPT

## 2024-02-12 PROCEDURE — 82570 ASSAY OF URINE CREATININE: CPT

## 2024-02-12 PROCEDURE — 6370000000 HC RX 637 (ALT 250 FOR IP)

## 2024-02-12 PROCEDURE — 93005 ELECTROCARDIOGRAM TRACING: CPT

## 2024-02-12 PROCEDURE — 6360000002 HC RX W HCPCS

## 2024-02-12 PROCEDURE — 92960 CARDIOVERSION ELECTRIC EXT: CPT

## 2024-02-12 PROCEDURE — 82962 GLUCOSE BLOOD TEST: CPT

## 2024-02-12 PROCEDURE — 93325 DOPPLER ECHO COLOR FLOW MAPG: CPT

## 2024-02-12 PROCEDURE — 80048 BASIC METABOLIC PNL TOTAL CA: CPT

## 2024-02-12 PROCEDURE — 93005 ELECTROCARDIOGRAM TRACING: CPT | Performed by: INTERNAL MEDICINE

## 2024-02-12 PROCEDURE — 93010 ELECTROCARDIOGRAM REPORT: CPT | Performed by: INTERNAL MEDICINE

## 2024-02-12 PROCEDURE — 93312 ECHO TRANSESOPHAGEAL: CPT

## 2024-02-12 PROCEDURE — 2580000003 HC RX 258

## 2024-02-12 PROCEDURE — 36415 COLL VENOUS BLD VENIPUNCTURE: CPT

## 2024-02-12 PROCEDURE — 2140000000 HC CCU INTERMEDIATE R&B

## 2024-02-12 PROCEDURE — 6370000000 HC RX 637 (ALT 250 FOR IP): Performed by: INTERNAL MEDICINE

## 2024-02-12 PROCEDURE — 85610 PROTHROMBIN TIME: CPT

## 2024-02-12 PROCEDURE — 93321 DOPPLER ECHO F-UP/LMTD STD: CPT

## 2024-02-12 PROCEDURE — 6370000000 HC RX 637 (ALT 250 FOR IP): Performed by: STUDENT IN AN ORGANIZED HEALTH CARE EDUCATION/TRAINING PROGRAM

## 2024-02-12 RX ADMIN — CALCITRIOL CAPSULES 0.25 MCG 0.25 MCG: 0.25 CAPSULE ORAL at 08:42

## 2024-02-12 RX ADMIN — DILTIAZEM HYDROCHLORIDE 30 MG: 30 TABLET, FILM COATED ORAL at 08:41

## 2024-02-12 RX ADMIN — PANTOPRAZOLE SODIUM 40 MG: 40 TABLET, DELAYED RELEASE ORAL at 08:41

## 2024-02-12 RX ADMIN — SODIUM CHLORIDE, PRESERVATIVE FREE 10 ML: 5 INJECTION INTRAVENOUS at 08:42

## 2024-02-12 RX ADMIN — WARFARIN SODIUM 7.5 MG: 5 TABLET ORAL at 17:36

## 2024-02-12 RX ADMIN — MONTELUKAST 10 MG: 10 TABLET, FILM COATED ORAL at 08:42

## 2024-02-12 RX ADMIN — GABAPENTIN 300 MG: 300 CAPSULE ORAL at 08:42

## 2024-02-12 RX ADMIN — METOPROLOL TARTRATE 25 MG: 25 TABLET, FILM COATED ORAL at 21:55

## 2024-02-12 RX ADMIN — ESCITALOPRAM OXALATE 20 MG: 10 TABLET ORAL at 08:41

## 2024-02-12 RX ADMIN — DILTIAZEM HYDROCHLORIDE 30 MG: 30 TABLET, FILM COATED ORAL at 02:35

## 2024-02-12 RX ADMIN — SODIUM CHLORIDE: 9 INJECTION, SOLUTION INTRAVENOUS at 09:41

## 2024-02-12 RX ADMIN — SUCRALFATE 1 G: 1 TABLET ORAL at 17:36

## 2024-02-12 RX ADMIN — SUCRALFATE 1 G: 1 TABLET ORAL at 11:56

## 2024-02-12 RX ADMIN — DILTIAZEM HYDROCHLORIDE 30 MG: 30 TABLET, FILM COATED ORAL at 11:56

## 2024-02-12 RX ADMIN — ASPIRIN 81 MG: 81 TABLET, CHEWABLE ORAL at 08:42

## 2024-02-12 RX ADMIN — AMIODARONE HYDROCHLORIDE 200 MG: 200 TABLET ORAL at 08:42

## 2024-02-12 RX ADMIN — ATORVASTATIN CALCIUM 40 MG: 40 TABLET, FILM COATED ORAL at 21:55

## 2024-02-12 RX ADMIN — LISINOPRIL 10 MG: 10 TABLET ORAL at 08:42

## 2024-02-12 RX ADMIN — GABAPENTIN 300 MG: 300 CAPSULE ORAL at 13:22

## 2024-02-12 RX ADMIN — DILTIAZEM HYDROCHLORIDE 30 MG: 30 TABLET, FILM COATED ORAL at 17:36

## 2024-02-12 RX ADMIN — SUCRALFATE 1 G: 1 TABLET ORAL at 21:55

## 2024-02-12 RX ADMIN — Medication 324 MG: at 21:55

## 2024-02-12 RX ADMIN — SODIUM CHLORIDE, PRESERVATIVE FREE 10 ML: 5 INJECTION INTRAVENOUS at 21:55

## 2024-02-12 RX ADMIN — AMIODARONE HYDROCHLORIDE 200 MG: 200 TABLET ORAL at 21:55

## 2024-02-12 RX ADMIN — TIOTROPIUM BROMIDE INHALATION SPRAY 2 PUFF: 3.12 SPRAY, METERED RESPIRATORY (INHALATION) at 06:24

## 2024-02-12 RX ADMIN — GABAPENTIN 300 MG: 300 CAPSULE ORAL at 21:55

## 2024-02-12 RX ADMIN — PANTOPRAZOLE SODIUM 40 MG: 40 TABLET, DELAYED RELEASE ORAL at 17:36

## 2024-02-12 RX ADMIN — Medication 324 MG: at 08:42

## 2024-02-12 RX ADMIN — METOPROLOL TARTRATE 25 MG: 25 TABLET, FILM COATED ORAL at 08:42
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
## 2024-02-13 VITALS
DIASTOLIC BLOOD PRESSURE: 65 MMHG | HEART RATE: 61 BPM | RESPIRATION RATE: 16 BRPM | WEIGHT: 236.13 LBS | TEMPERATURE: 97.2 F | SYSTOLIC BLOOD PRESSURE: 100 MMHG | BODY MASS INDEX: 36.98 KG/M2 | OXYGEN SATURATION: 99 %

## 2024-02-13 LAB
ALBUMIN SERPL-MCNC: 3.5 G/DL (ref 3.5–5.2)
ALP SERPL-CCNC: 62 U/L (ref 35–104)
ALT SERPL-CCNC: 11 U/L (ref 5–33)
ANION GAP SERPL CALCULATED.3IONS-SCNC: 10 MMOL/L (ref 7–19)
ANION GAP SERPL CALCULATED.3IONS-SCNC: 11 MMOL/L (ref 7–19)
AST SERPL-CCNC: 22 U/L (ref 5–32)
BILIRUB SERPL-MCNC: <0.2 MG/DL (ref 0.2–1.2)
BUN SERPL-MCNC: 44 MG/DL (ref 8–23)
BUN SERPL-MCNC: 45 MG/DL (ref 8–23)
CALCIUM SERPL-MCNC: 8 MG/DL (ref 8.8–10.2)
CALCIUM SERPL-MCNC: 8.5 MG/DL (ref 8.8–10.2)
CHLORIDE SERPL-SCNC: 107 MMOL/L (ref 98–111)
CHLORIDE SERPL-SCNC: 110 MMOL/L (ref 98–111)
CO2 SERPL-SCNC: 19 MMOL/L (ref 22–29)
CO2 SERPL-SCNC: 20 MMOL/L (ref 22–29)
CREAT SERPL-MCNC: 1.7 MG/DL (ref 0.5–0.9)
CREAT SERPL-MCNC: 2.3 MG/DL (ref 0.5–0.9)
EKG P AXIS: -1 DEGREES
EKG P-R INTERVAL: 168 MS
EKG Q-T INTERVAL: 464 MS
EKG QRS DURATION: 134 MS
EKG QTC CALCULATION (BAZETT): 474 MS
EKG T AXIS: 8 DEGREES
ERYTHROCYTE [DISTWIDTH] IN BLOOD BY AUTOMATED COUNT: 16.6 % (ref 11.5–14.5)
GLUCOSE BLD-MCNC: 77 MG/DL (ref 70–99)
GLUCOSE BLD-MCNC: 85 MG/DL (ref 70–99)
GLUCOSE SERPL-MCNC: 88 MG/DL (ref 74–109)
GLUCOSE SERPL-MCNC: 95 MG/DL (ref 74–109)
HCT VFR BLD AUTO: 33.7 % (ref 37–47)
HGB BLD-MCNC: 10.6 G/DL (ref 12–16)
INR PPP: 2.91 (ref 0.88–1.18)
MCH RBC QN AUTO: 29.4 PG (ref 27–31)
MCHC RBC AUTO-ENTMCNC: 31.5 G/DL (ref 33–37)
MCV RBC AUTO: 93.6 FL (ref 81–99)
PERFORMED ON: NORMAL
PERFORMED ON: NORMAL
PLATELET # BLD AUTO: 158 K/UL (ref 130–400)
PMV BLD AUTO: 11.3 FL (ref 9.4–12.3)
POTASSIUM SERPL-SCNC: 4.3 MMOL/L (ref 3.5–5)
POTASSIUM SERPL-SCNC: 4.4 MMOL/L (ref 3.5–5)
PROT SERPL-MCNC: 6 G/DL (ref 6.6–8.7)
PROTHROMBIN TIME: 29.7 SEC (ref 12–14.6)
RBC # BLD AUTO: 3.6 M/UL (ref 4.2–5.4)
SODIUM SERPL-SCNC: 137 MMOL/L (ref 136–145)
SODIUM SERPL-SCNC: 140 MMOL/L (ref 136–145)
WBC # BLD AUTO: 5.6 K/UL (ref 4.8–10.8)

## 2024-02-13 PROCEDURE — 85610 PROTHROMBIN TIME: CPT

## 2024-02-13 PROCEDURE — 6370000000 HC RX 637 (ALT 250 FOR IP)

## 2024-02-13 PROCEDURE — 36415 COLL VENOUS BLD VENIPUNCTURE: CPT

## 2024-02-13 PROCEDURE — 2580000003 HC RX 258: Performed by: INTERNAL MEDICINE

## 2024-02-13 PROCEDURE — 80053 COMPREHEN METABOLIC PANEL: CPT

## 2024-02-13 PROCEDURE — 97116 GAIT TRAINING THERAPY: CPT

## 2024-02-13 PROCEDURE — 2580000003 HC RX 258

## 2024-02-13 PROCEDURE — 82962 GLUCOSE BLOOD TEST: CPT

## 2024-02-13 PROCEDURE — 97110 THERAPEUTIC EXERCISES: CPT

## 2024-02-13 PROCEDURE — 85027 COMPLETE CBC AUTOMATED: CPT

## 2024-02-13 PROCEDURE — 6370000000 HC RX 637 (ALT 250 FOR IP): Performed by: STUDENT IN AN ORGANIZED HEALTH CARE EDUCATION/TRAINING PROGRAM

## 2024-02-13 PROCEDURE — 94760 N-INVAS EAR/PLS OXIMETRY 1: CPT

## 2024-02-13 PROCEDURE — 93246 EXT ECG>7D<15D RECORDING: CPT

## 2024-02-13 PROCEDURE — 93010 ELECTROCARDIOGRAM REPORT: CPT | Performed by: INTERNAL MEDICINE

## 2024-02-13 PROCEDURE — 6370000000 HC RX 637 (ALT 250 FOR IP): Performed by: INTERNAL MEDICINE

## 2024-02-13 PROCEDURE — 93005 ELECTROCARDIOGRAM TRACING: CPT

## 2024-02-13 RX ORDER — ASPIRIN 81 MG/1
81 TABLET, CHEWABLE ORAL DAILY
Qty: 30 TABLET | Refills: 3 | Status: SHIPPED | OUTPATIENT
Start: 2024-02-14

## 2024-02-13 RX ORDER — LISINOPRIL 5 MG/1
5 TABLET ORAL DAILY
Status: DISCONTINUED | OUTPATIENT
Start: 2024-02-14 | End: 2024-02-13 | Stop reason: HOSPADM

## 2024-02-13 RX ORDER — AMIODARONE HYDROCHLORIDE 200 MG/1
200 TABLET ORAL 2 TIMES DAILY
Qty: 60 TABLET | Refills: 0 | Status: SHIPPED | OUTPATIENT
Start: 2024-02-13

## 2024-02-13 RX ORDER — WARFARIN SODIUM 5 MG/1
5 TABLET ORAL DAILY
Qty: 30 TABLET | Refills: 0 | Status: SHIPPED | OUTPATIENT
Start: 2024-02-14

## 2024-02-13 RX ORDER — WARFARIN SODIUM 1 MG/1
2 TABLET ORAL
Status: DISCONTINUED | OUTPATIENT
Start: 2024-02-13 | End: 2024-02-13 | Stop reason: HOSPADM

## 2024-02-13 RX ORDER — 0.9 % SODIUM CHLORIDE 0.9 %
500 INTRAVENOUS SOLUTION INTRAVENOUS ONCE
Status: COMPLETED | OUTPATIENT
Start: 2024-02-13 | End: 2024-02-13

## 2024-02-13 RX ADMIN — ASPIRIN 81 MG: 81 TABLET, CHEWABLE ORAL at 07:53

## 2024-02-13 RX ADMIN — AMIODARONE HYDROCHLORIDE 200 MG: 200 TABLET ORAL at 08:56

## 2024-02-13 RX ADMIN — LISINOPRIL 10 MG: 10 TABLET ORAL at 07:53

## 2024-02-13 RX ADMIN — ESCITALOPRAM OXALATE 20 MG: 10 TABLET ORAL at 07:53

## 2024-02-13 RX ADMIN — SUCRALFATE 1 G: 1 TABLET ORAL at 17:16

## 2024-02-13 RX ADMIN — Medication 324 MG: at 07:53

## 2024-02-13 RX ADMIN — SUCRALFATE 1 G: 1 TABLET ORAL at 06:00

## 2024-02-13 RX ADMIN — GABAPENTIN 300 MG: 300 CAPSULE ORAL at 14:22

## 2024-02-13 RX ADMIN — GABAPENTIN 300 MG: 300 CAPSULE ORAL at 07:53

## 2024-02-13 RX ADMIN — MONTELUKAST 10 MG: 10 TABLET, FILM COATED ORAL at 07:53

## 2024-02-13 RX ADMIN — SODIUM CHLORIDE, PRESERVATIVE FREE 10 ML: 5 INJECTION INTRAVENOUS at 07:53

## 2024-02-13 RX ADMIN — CALCITRIOL CAPSULES 0.25 MCG 0.25 MCG: 0.25 CAPSULE ORAL at 07:53

## 2024-02-13 RX ADMIN — DILTIAZEM HYDROCHLORIDE 30 MG: 30 TABLET, FILM COATED ORAL at 00:36

## 2024-02-13 RX ADMIN — TIOTROPIUM BROMIDE INHALATION SPRAY 2 PUFF: 3.12 SPRAY, METERED RESPIRATORY (INHALATION) at 06:31

## 2024-02-13 RX ADMIN — SODIUM CHLORIDE 500 ML: 9 INJECTION, SOLUTION INTRAVENOUS at 11:20

## 2024-02-13 RX ADMIN — PANTOPRAZOLE SODIUM 40 MG: 40 TABLET, DELAYED RELEASE ORAL at 17:16

## 2024-02-13 RX ADMIN — LEVOTHYROXINE SODIUM 75 MCG: 75 TABLET ORAL at 06:00

## 2024-02-13 RX ADMIN — METOPROLOL TARTRATE 25 MG: 25 TABLET, FILM COATED ORAL at 07:53

## 2024-02-13 RX ADMIN — PANTOPRAZOLE SODIUM 40 MG: 40 TABLET, DELAYED RELEASE ORAL at 07:53

## 2024-02-13 RX ADMIN — SUCRALFATE 1 G: 1 TABLET ORAL at 11:21

## 2024-02-13 NOTE — DISCHARGE INSTRUCTIONS
home?  Activity    If a sedative was used, your doctor will tell you when it is safe for you to do your normal activities.     For your safety, do not drive or operate any machinery that could be dangerous. Wait until the medicine wears off and you can think clearly and react easily.   Diet    Do not eat or drink until the numbness in your throat wears off.     When the numbness is gone, you can eat your normal diet.     Throat lozenges and warm saltwater gargles can help relieve throat soreness. Throat lozenges can be used by people age 4 or older. And most people can gargle at age 8 and older.     Do not drink alcohol for 24 hours.   Follow-up care is a key part of your treatment and safety. Be sure to make and go to all appointments, and call your doctor if you are having problems. It's also a good idea to know your test results and keep a list of the medicines you take.  When should you call for help?   Call 911 anytime you think you may need emergency care. For example, call if:    Your stools are maroon or very bloody.     You vomit blood or what looks like coffee grounds.   Call your doctor now or seek immediate medical care if:    You have pain in your chest, belly, or back.     You have new or worse trouble swallowing.     You have trouble breathing.   Watch closely for changes in your health, and be sure to contact your doctor if you have any problems.  Current as of: June 25, 2023               Content Version: 13.9  © 2006-2023 [employer].   Care instructions adapted under license by [employer]. If you have questions about a medical condition or this instruction, always ask your healthcare professional. [employer] disclaims any warranty or liability for your use of this information.

## 2024-02-13 NOTE — CARE COORDINATION
02/13/24 1534   IMM Letter   IMM Letter given to Patient/Family/Significant other/Guardian/POA/by: Caren Clark   IMM Letter date given: 02/13/24   IMM Letter time given: 1530     Second IMM given to patient and explained with patient verbalizing understanding.  All questions and concerns addressed     Signed letter placed in pt soft chart  Patient declined waiting 4 hr period prior to discharge.  Electronically signed by ANJUM HERNANDEZ on 2/13/2024 at 3:34 PM

## 2024-02-13 NOTE — DISCHARGE SUMMARY
and our nursing staff, who verbalizes understanding and is satisfied with the plan. Patient has been advised to continue all medications as prescribed and advised, and f/u with PCP within 1 week. Patient is stable from medical standpoint to be discharged.    Total time spent during patient evaluation and assessment, discussion with the nurse/family, addressing discharge medications/scripts and coordination of care for safe discharge was 32 minutes.      Signed Electronically:    Lee Minaya MD  3:35 PM 2/13/2024

## 2024-02-13 NOTE — PROGRESS NOTES
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 manage her CKD stage IIIa.  She denies NSAIDs abuse.  Today, she offers no new complaints. She remains dyspneic some and may be going for GIOVANNI guided cardioversion on 2/12.    Past Medical/Surgical History      Active Ambulatory Problems     Diagnosis Date Noted    Gastroesophageal reflux disease 05/02/2014    History of gastric bypass 05/02/2014    Family history of colon cancer 05/02/2014    Fibromyalgia 03/14/2014    Encounter for current long-term use of anticoagulants 02/22/2018    Primary osteoarthritis of right knee 03/22/2018    Arthritis of knee 03/26/2018    Hypertensive disorder 09/22/2016    Hyperglycemia 03/26/2018    Complex sleep apnea syndrome 03/26/2018    Iron deficiency anemia 03/26/2018    Restrictive airway disease 03/27/2018    DVT, lower extremity, proximal, acute, unspecified laterality (HCC) 07/24/2018    History of ulcer disease 05/10/2017    Anticoagulated on Coumadin     Postmenopausal osteoporosis 05/16/2019    Diabetes mellitus (HCC) 
Zio patch placed on patient.    Per patient, ride will be here around 1730.  
mobility training, Balance training, Gait training, Therapeutic activities, Safety education & training, Patient/Caregiver education & training  Safety Devices  Type of Devices: Call light within reach, Gait belt, Left in bed, Nurse notified, Bed alarm in place     Restrictions        Subjective   Pain: Patient reports no pain at this time  General  Chart Reviewed: Yes  Patient assessed for rehabilitation services?: Yes  Diagnosis: A-fib with RVR  Follows Commands: Within Functional Limits  Subjective  Subjective: Agrees to work with therapy         Social/Functional History  Social/Functional History  Lives With: Family  Type of Home: Trailer  Home Layout: One level  Home Access: Stairs to enter with rails  Entrance Stairs - Number of Steps: 5  Bathroom Shower/Tub: Tub/Shower unit  Bathroom Toilet: Standard  Bathroom Equipment: None  Bathroom Accessibility: Accessible  Home Equipment: None  Receives Help From: Family  ADL Assistance: Independent  Homemaking Assistance: Independent  Homemaking Responsibilities: Yes  Ambulation Assistance: Independent  Transfer Assistance: Independent  Active : Yes  Mode of Transportation: Car  Occupation: Retired  Vision/Hearing       Cognition   Orientation  Overall Orientation Status: Within Functional Limits  Cognition  Overall Cognitive Status: WFL     Objective   Pulse: 85  Heart Rate Source: Monitor  BP: 109/78  BP Location: Right upper arm  BP Method: Automatic  Patient Position: Supine  MAP (Calculated): 88  Respirations: 16  SpO2: 99 %  O2 Device: None (Room air)  Temp: 97.3 °F (36.3 °C)              PROM RLE (degrees)  RLE PROM: WFL  PROM LLE (degrees)  LLE PROM: WFL  Strength RLE  Comment: Grossly 4/5  Strength LLE  Comment: Grossly 4/5           Bed mobility  Supine to Sit: Modified independent  Sit to Supine: Modified independent  Scooting: Modified independent  Transfers  Sit to Stand: Contact guard assistance  Stand to Sit: Contact guard assistance  Bed to 
obstructive lung disease (HCC) 05/24/2022    CKD (chronic kidney disease) stage 2, GFR 60-89 ml/min     Coat's syndrome     Coat's syndrome     both eyes    COPD (chronic obstructive pulmonary disease) (HCC)     Depression     Diabetes mellitus type 2 in nonobese (HCC)     Diabetic nephropathy (HCC)     Disequilibrium     Dizziness     DVT (deep venous thrombosis) (HCC)     Exudative retinopathy     Fibromyalgia     Fibromyositis     Gastric ulcer     GERD (gastroesophageal reflux disease)     History of gastric bypass     Hx of blood clots     Hx of lupus anticoagulant disorder     Hyperlipidemia 05/06/2019    Hypertension     Hypothyroidism     Intermittent claudication (HCC)     Intestinal obstruction (HCC)     Iron deficiency     Left-sided weakness     Low vitamin D level     Lupus (HCC)     Menopause     Obesity     Osteoarthritis     Osteoporosis     Other iron deficiency anemias     Palliative care patient 09/24/2020    Paroxysmal atrial fibrillation (HCC) 08/29/2022    Pernicious anemia     PONV (postoperative nausea and vomiting)     PUPP (pruritic urticarial papules and plaques of pregnancy)     Restless legs syndrome (RLS) 07/11/2019    Right leg numbness     Right sided sciatica     Sarcoidosis     with liver involvement    Sciatica     Secondary hyperparathyroidism (HCC)     Shingles     Shortness of breath     Sleep apnea     Stomach ulcer     Syncope     Visual loss, one eye        Past Surgical History:  Past Surgical History:   Procedure Laterality Date    APPENDECTOMY      CARDIAC CATHETERIZATION  10/21/13  [facility]    EF over 60%    CHOLECYSTECTOMY      COLONOSCOPY  02/2010    negative    COLONOSCOPY  02/22/2010    Dr Mcdaniel    COLONOSCOPY  04/01/2016    Dr LAKHWINDER Horvath-internal hemorrhoids, 5 yr recall    COLONOSCOPY N/A 05/07/2021    Dr LAKHWINDER Horvath-Significant looping/tortuosity throughout the left colon, BE=Normal    DILATATION, ESOPHAGUS      EYE SURGERY      Cyst on Right eye    EYE SURGERY      
tablet by mouth once daily 7/3/23   Regina Curtis MD   levothyroxine (SYNTHROID) 75 MCG tablet Take 1 tablet by mouth Daily 7/3/23   Regina Curtis MD   montelukast (SINGULAIR) 10 MG tablet Take 1 tablet by mouth daily 7/3/23   Regina Curtis MD   clobetasol (TEMOVATE) 0.05 % cream Apply topically 2 times daily. 5/5/23   Regina Curtis MD   calcipotriene (DOVONEX) 0.005 % cream Use topically as needed 5/5/23   Regina Curtis MD   Hydrocortisone, Perianal, (PROCTO-CASPER) 1 % cream Apply topically 2 times daily. 5/5/23   Regina Curtis MD   docusate sodium (COLACE) 100 MG capsule TAKE 1 CAPSULE BY MOUTH TWICE DAILY AS NEEDED FOR CONSTIPATION 7/19/22   ProviderMartinez MD   ondansetron (ZOFRAN) 4 MG tablet Take 1 tablet by mouth every 8 hours as needed for Nausea 3/31/22   Regina Curtis MD   CPAP Machine MISC Inhale 1 each into the lungs nightly    ProviderMartinez MD   Multiple Vitamins-Minerals (CENTRUM ADULTS) TABS Take 1 tablet by mouth daily    ProviderMartinez MD        amiodarone  200 mg Oral BID    bumetanide  0.5 mg IntraVENous BID    calcitRIOL  0.25 mcg Oral Daily    escitalopram  20 mg Oral Daily    ferrous gluconate  324 mg Oral BID    gabapentin  300 mg Oral TID    levothyroxine  75 mcg Oral Daily    lisinopril  10 mg Oral Daily    montelukast  10 mg Oral Daily    pantoprazole  40 mg Oral BID WC    sucralfate  1 g Oral 4x Daily AC & HS    tiotropium  2 puff Inhalation Daily RT    vitamin D  50,000 Units Oral Weekly    metoprolol tartrate  25 mg Oral BID    warfarin placeholder: dosing by pharmacy   Other RX Placeholder    sodium chloride flush  5-40 mL IntraVENous 2 times per day    aspirin  81 mg Oral Daily    atorvastatin  40 mg Oral Nightly    enoxaparin  1 mg/kg SubCUTAneous BID    dilTIAZem  30 mg Oral 4 times per day       TELEMETRY: Atrial fibrillation     Physical Exam:      Physical Exam      General:  Awake, alert, NAD  Skin:  Warm and dry  Neck:  no 
tablet 40 mg, 40 mg, Oral, Nightly, Rustam Gómez, APRN - CNP, 40 mg at 02/08/24 2113    perflutren lipid microspheres (DEFINITY) injection 1.5 mL, 1.5 mL, IntraVENous, ONCE PRN, Rustam Gómez, APRN - CNP    enoxaparin (LOVENOX) injection 105 mg, 1 mg/kg, SubCUTAneous, BID, Rustam Gómez, APRN - CNP, 105 mg at 02/09/24 0917    dilTIAZem (CARDIZEM) tablet 30 mg, 30 mg, Oral, 4 times per day, Rustam GómezCHANELLE - CNP, 30 mg at 02/09/24 1259      Imaging:  XR CHEST PORTABLE    Result Date: 2/8/2024  Cardiomediastinal contours appear stable.  Left-sided pacer device in place.  Interstitial prominence within the right lung base may represent atelectasis or pneumonitis.  There is no focal pulmonary consolidation.  No pleural effusion.  No pneumothorax.   ______________________________________ Electronically signed by: CHARU CHEATHAM M.D. Date:     02/08/2024 Time:    12:01          Assessment/Plan  Principal Problem:    Atrial fibrillation with rapid ventricular response (HCC)  Active Problems:    Chronic renal disease, stage III ([facility]) [500212]    Diabetes mellitus ([facility])    Morbid obesity due to excess calories ([facility])    Hyperlipidemia    Chest pain, unspecified    Hypertension    Subtherapeutic international normalized ratio (INR)  Resolved Problems:    * No resolved hospital problems. *     Afib with RVR  -- Cardiology following  -- Amiodarone added to try and achieve rhythm control  -- Continue PO Cardizem and Metoprolol  -- Lovenox 1 mg/kg BID  -- Restart Warfarin, pharmacy to dose    Chest pain  -- EKG without acute ST or T wave changes  -- HS troponin 37 > 34  -- Cardiology following  -- Echo showed normal EF, no regional wall motion abnormalities  -- Lexiscan on 2/9 result pending  -- Continue ASA, statin    Acute on chronic diastolic heart failure  -- Echo showed normal EF, no regional wall motion abnormalities, unable to comment on diastolic dysfunction due to atrial fibrillation  -- Continue Bumex 1 mg IV 
  Occupational History     Employer: [employer]      Comment:    Tobacco Use    Smoking status: Never    Smokeless tobacco: Never   Vaping Use    Vaping Use: Never used   Substance and Sexual Activity    Alcohol use: Never    Drug use: Never    Sexual activity: Not Currently     Comment: has a daughter   Social History Narrative    CODE STATUS: Full Code    HEALTH CARE PROXY: her daughter, [phone]    Backup: Shayy, grand daughter, [phone]    AMBULATES: uses a Rollator    DOMICILED: has steps to enter, no stairs inside, lives with her daughter and two grand daughters, her niece, and her         Lives with daughter, son-in-law, granddaughter, niece.    Drives very little, daughter usually transports pt.    Works part time at [employer].    Has pet dog.     Social Determinants of Health     Financial Resource Strain: Low Risk  (1/18/2024)    Overall Financial Resource Strain (CARDIA)     Difficulty of Paying Living Expenses: Not hard at all   Food Insecurity: No Food Insecurity (2/8/2024)    Hunger Vital Sign     Worried About Running Out of Food in the Last Year: Never true     Ran Out of Food in the Last Year: Never true   Transportation Needs: No Transportation Needs (2/8/2024)    PRAPARE - Transportation     Lack of Transportation (Medical): No     Lack of Transportation (Non-Medical): No   Physical Activity: Inactive (5/2/2023)    Exercise Vital Sign     Days of Exercise per Week: 0 days     Minutes of Exercise per Session: 0 min   Stress: No Stress Concern Present (4/14/2021)    [employer] - Occupational Stress Questionnaire     Feeling of Stress : Only a little   Social Connections: Moderately Integrated (4/14/2021)    Social Connection and Isolation Panel [NHANES]     Frequency of Communication with Friends and Family: More than three times a week     Frequency of Social Gatherings with Friends and Family: More than three times a 
SPECT acquisition protocol along with review of the polar plot revealed: 1. Ejection fraction 45% mildly reduced 2. Wall motion unremarkable 3. Myocardial perfusion imaging demonstrated homogeneous uptake of the tracer all visualized segments no definite areas of ischemia or infarction are identified. Summary impressions: Mildly reduced ejection fraction 45% correlate with echocardiogram etc. unremarkable perfusion study without evidence of ischemia or previous infarction. Signed by Dr Tyrone Mazariegos    XR CHEST PORTABLE    Result Date: 2/8/2024  EXAM: CHEST, SINGLE VIEW  HISTORY: Chest pain  COMPARISON: 03/29/2022      Cardiomediastinal contours appear stable.  Left-sided pacer device in place.  Interstitial prominence within the right lung base may represent atelectasis or pneumonitis.  There is no focal pulmonary consolidation.  No pleural effusion.  No pneumothorax.   ______________________________________ Electronically signed by: CHARU CHEATHAM M.D. Date:     02/08/2024 Time:    12:01         Assessment:  Admission to [facility] for chest pain mid substernal radiation between shoulder blades jaw and left arm dyspnea on minimal exertion since Saturday  Paroxysmal atrial fibrillation on warfarin  History of SVT  Sinus node dysfunction  Status post dual-chamber pacemaker 2009  Fibromyalgia  Gastroesophageal reflux disease  Hypertension  Creatinine 1.5  proBNP 10,242  Chronic kidney disease  Coat syndrome  COPD  Depression  Diabetes mellitus type 2  Diabetic nephropathy  History of DVT  Exudative retinopathy  Gastric ulcer  Gastroesophageal reflux disease  History of gastric bypass  History of blood clots  History of lupus anticoagulant disorder  Hyperlipidemia mixed  Intermittent claudication  Hypothyroidism  Iron deficiency  Vitamin D deficiency  Iron deficiency anemia  Restless leg syndrome  Sciatica  History of sarcoidosis with liver involvement  History of herpes zoster  Chronic shortness of breath  Obstructive 
per 24 hour   Intake 248 ml   Output --   Net 248 ml     General: awake/alert   HEENT: Normocephalic atraumatic head  Neck: Supple with no JVD or carotid bruits.  Chest:  clear to auscultation bilaterally  CVS: Irregularly irregular S1 and S2.  Abdominal: soft, nontender, normal bowel sounds  Extremities: no cyanosis or edema  Skin: warm and dry without rash    Labs:  BMP:   Recent Labs     02/10/24  0115 02/11/24  0122 02/12/24  0141    135* 139   K 3.6 3.8 4.3    104 108   CO2 19* 18* 17*   BUN 26* 33* 40*   CREATININE 1.7* 1.9* 1.8*   CALCIUM 8.9 8.6* 8.3*     CBC:   Recent Labs     02/10/24  0115 02/11/24  0122   WBC 5.3 5.8   HGB 12.2 11.9*   HCT 37.5 37.6   MCV 91.0 93.3    152     LIVER PROFILE:   Recent Labs     02/10/24  0115 02/11/24  0122   AST 20 17   ALT 10 8   BILITOT <0.2 <0.2   ALKPHOS 72 68     PT/INR:   Recent Labs     02/10/24  0115 02/11/24  0122 02/12/24  0141   PROTIME 21.0* 23.4* 25.5*   INR 1.87* 2.15* 2.40*     APTT: No results for input(s): \"APTT\" in the last 72 hours.  BNP:  No results for input(s): \"BNP\" in the last 72 hours.  Ionized Calcium:No results for input(s): \"IONCA\" in the last 72 hours.  Magnesium:No results for input(s): \"MG\" in the last 72 hours.  Phosphorus:No results for input(s): \"PHOS\" in the last 72 hours.  HgbA1C: No results for input(s): \"LABA1C\" in the last 72 hours.  Hepatic:   Recent Labs     02/10/24  0115 02/11/24  0122   ALKPHOS 72 68   ALT 10 8   AST 20 17   PROT 7.3 6.7   BILITOT <0.2 <0.2   LABALBU 3.8 3.3*     Lactic Acid: No results for input(s): \"LACTA\" in the last 72 hours.  Troponin:   Recent Labs     02/10/24  0115   CKTOTAL 171     ABGs:   Lab Results   Component Value Date/Time    PHART 7.460 09/23/2020 05:33 PM    PO2ART 60.0 09/23/2020 05:33 PM    EFB8BEN 27.0 09/23/2020 05:33 PM     CRP:  No results for input(s): \"CRP\" in the last 72 hours.  Sed Rate:  No results for input(s): \"SEDRATE\" in the last 72 hours.    Culture Results:

## 2024-02-14 ENCOUNTER — TELEPHONE (OUTPATIENT)
Dept: INTERNAL MEDICINE | Age: 74
End: 2024-02-14

## 2024-02-14 LAB
EKG P AXIS: NORMAL DEGREES
EKG P-R INTERVAL: NORMAL MS
EKG Q-T INTERVAL: 450 MS
EKG QRS DURATION: 116 MS
EKG QTC CALCULATION (BAZETT): 458 MS
EKG T AXIS: -16 DEGREES

## 2024-02-14 PROCEDURE — 93010 ELECTROCARDIOGRAM REPORT: CPT | Performed by: INTERNAL MEDICINE

## 2024-02-14 NOTE — TELEPHONE ENCOUNTER
Care Transitions Initial Follow Up Call    Outreach made within 2 business days of discharge: Yes    Patient: Leny Stone   Patient : 1950   MRN: [id number]    Reason for Admission: Atrial fibrillation with rapid ventricular response  Discharge Date: 24       Spoke with: Leny Stone    Discharge department/facility: [facility] Interactive Patient Contact:  Was patient able to fill all prescriptions: Yes  Was patient instructed to bring all medications to the follow-up visit: Yes  Is patient taking all medications as directed in the discharge summary? Yes  Does patient understand their discharge instructions: Yes  Does patient have questions or concerns that need addressed prior to 7-14 day follow up office visit: no    The patient reported she is having a little dizziness. She denies any chest pain, elevated heart rate, SOB, fatigue or nausea/vomiting. Her bowel and bladder are normal. Her appetite is good also.         Scheduled appointment with PCP within 7-14 days    Follow Up  Future Appointments   Date Time Provider Department Center   2024  2:00 PM Regina Curtis MD [facility] RENEEY [facility]   3/6/2024 10:30 AM Malou Cuenca APRN [facility] HEMONC [facility]   3/6/2024 10:30 AM SCHEDULE, [facility] MED ONC [facility] MED ONC [facility]   3/11/2024 10:00 AM Albina Muñiz PA N [facility] NEURO Neurology -   3/14/2024  3:00 PM Tyrone Ayon MD N [facility] Cardio [facility]   2024 11:00 AM Regina Curtis MD [facility]       [place] Nice MA

## 2024-02-15 DIAGNOSIS — E03.9 HYPOTHYROIDISM, UNSPECIFIED TYPE: ICD-10-CM

## 2024-02-15 DIAGNOSIS — N18.31 STAGE 3A CHRONIC KIDNEY DISEASE (HCC): Primary | ICD-10-CM

## 2024-02-15 RX ORDER — LEVOTHYROXINE SODIUM 0.07 MG/1
75 TABLET ORAL DAILY
Qty: 30 TABLET | Refills: 5 | Status: SHIPPED | OUTPATIENT
Start: 2024-02-15

## 2024-02-20 ENCOUNTER — ANTI-COAG VISIT (OUTPATIENT)
Dept: PRIMARY CARE CLINIC | Age: 74
End: 2024-02-20
Payer: MEDICARE

## 2024-02-20 ENCOUNTER — OFFICE VISIT (OUTPATIENT)
Dept: INTERNAL MEDICINE | Age: 74
End: 2024-02-20

## 2024-02-20 VITALS
DIASTOLIC BLOOD PRESSURE: 70 MMHG | BODY MASS INDEX: 37.04 KG/M2 | HEIGHT: 67 IN | OXYGEN SATURATION: 99 % | HEART RATE: 77 BPM | WEIGHT: 236 LBS | SYSTOLIC BLOOD PRESSURE: 110 MMHG

## 2024-02-20 DIAGNOSIS — I25.83 CORONARY ARTERY DISEASE DUE TO LIPID RICH PLAQUE: ICD-10-CM

## 2024-02-20 DIAGNOSIS — F32.89 OTHER DEPRESSION: ICD-10-CM

## 2024-02-20 DIAGNOSIS — I48.91 ATRIAL FIBRILLATION, UNSPECIFIED TYPE (HCC): ICD-10-CM

## 2024-02-20 DIAGNOSIS — Z09 HOSPITAL DISCHARGE FOLLOW-UP: ICD-10-CM

## 2024-02-20 DIAGNOSIS — M54.50 CHRONIC LOW BACK PAIN, UNSPECIFIED BACK PAIN LATERALITY, UNSPECIFIED WHETHER SCIATICA PRESENT: ICD-10-CM

## 2024-02-20 DIAGNOSIS — E03.9 HYPOTHYROIDISM, UNSPECIFIED TYPE: ICD-10-CM

## 2024-02-20 DIAGNOSIS — Z91.09 ENVIRONMENTAL ALLERGIES: ICD-10-CM

## 2024-02-20 DIAGNOSIS — Z79.01 ANTICOAGULATED ON COUMADIN: Primary | ICD-10-CM

## 2024-02-20 DIAGNOSIS — I10 PRIMARY HYPERTENSION: ICD-10-CM

## 2024-02-20 DIAGNOSIS — R07.9 CHEST PAIN, UNSPECIFIED TYPE: ICD-10-CM

## 2024-02-20 DIAGNOSIS — E11.21 TYPE II DIABETES MELLITUS WITH NEPHROPATHY (HCC): ICD-10-CM

## 2024-02-20 DIAGNOSIS — J45.20 MILD INTERMITTENT REACTIVE AIRWAY DISEASE WITHOUT COMPLICATION: ICD-10-CM

## 2024-02-20 DIAGNOSIS — K21.9 GASTROESOPHAGEAL REFLUX DISEASE WITHOUT ESOPHAGITIS: ICD-10-CM

## 2024-02-20 DIAGNOSIS — D50.9 IRON DEFICIENCY ANEMIA, UNSPECIFIED IRON DEFICIENCY ANEMIA TYPE: ICD-10-CM

## 2024-02-20 DIAGNOSIS — I48.91 ATRIAL FIBRILLATION, UNSPECIFIED TYPE (HCC): Primary | ICD-10-CM

## 2024-02-20 DIAGNOSIS — R06.09 DYSPNEA ON EXERTION: ICD-10-CM

## 2024-02-20 DIAGNOSIS — I25.10 CORONARY ARTERY DISEASE DUE TO LIPID RICH PLAQUE: ICD-10-CM

## 2024-02-20 DIAGNOSIS — E78.5 HYPERLIPIDEMIA, UNSPECIFIED HYPERLIPIDEMIA TYPE: ICD-10-CM

## 2024-02-20 DIAGNOSIS — N18.30 STAGE 3 CHRONIC KIDNEY DISEASE, UNSPECIFIED WHETHER STAGE 3A OR 3B CKD (HCC): ICD-10-CM

## 2024-02-20 DIAGNOSIS — G47.30 SLEEP APNEA, UNSPECIFIED TYPE: ICD-10-CM

## 2024-02-20 DIAGNOSIS — E55.9 VITAMIN D DEFICIENCY: ICD-10-CM

## 2024-02-20 DIAGNOSIS — I50.9 CONGESTIVE HEART FAILURE, UNSPECIFIED HF CHRONICITY, UNSPECIFIED HEART FAILURE TYPE (HCC): ICD-10-CM

## 2024-02-20 DIAGNOSIS — E66.9 OBESITY WITH SERIOUS COMORBIDITY, UNSPECIFIED CLASSIFICATION, UNSPECIFIED OBESITY TYPE: ICD-10-CM

## 2024-02-20 DIAGNOSIS — G89.29 CHRONIC LOW BACK PAIN, UNSPECIFIED BACK PAIN LATERALITY, UNSPECIFIED WHETHER SCIATICA PRESENT: ICD-10-CM

## 2024-02-20 DIAGNOSIS — E11.49 OTHER DIABETIC NEUROLOGICAL COMPLICATION ASSOCIATED WITH TYPE 2 DIABETES MELLITUS (HCC): ICD-10-CM

## 2024-02-20 DIAGNOSIS — E53.8 B12 DEFICIENCY: ICD-10-CM

## 2024-02-20 LAB
ALBUMIN SERPL-MCNC: 4.4 G/DL (ref 3.5–5.2)
ALP SERPL-CCNC: 74 U/L (ref 35–104)
ALT SERPL-CCNC: 14 U/L (ref 5–33)
ANION GAP SERPL CALCULATED.3IONS-SCNC: 14 MMOL/L (ref 7–19)
AST SERPL-CCNC: 22 U/L (ref 5–32)
BASOPHILS # BLD: 0 K/UL (ref 0–0.2)
BASOPHILS NFR BLD: 0.4 % (ref 0–1)
BILIRUB SERPL-MCNC: <0.2 MG/DL (ref 0.2–1.2)
BNP BLD-MCNC: 898 PG/ML (ref 0–124)
BUN SERPL-MCNC: 25 MG/DL (ref 8–23)
CALCIUM SERPL-MCNC: 9.4 MG/DL (ref 8.8–10.2)
CHLORIDE SERPL-SCNC: 102 MMOL/L (ref 98–111)
CO2 SERPL-SCNC: 23 MMOL/L (ref 22–29)
CREAT SERPL-MCNC: 1.5 MG/DL (ref 0.5–0.9)
EOSINOPHIL # BLD: 0.1 K/UL (ref 0–0.6)
EOSINOPHIL NFR BLD: 1.8 % (ref 0–5)
ERYTHROCYTE [DISTWIDTH] IN BLOOD BY AUTOMATED COUNT: 16.7 % (ref 11.5–14.5)
GLUCOSE SERPL-MCNC: 83 MG/DL (ref 74–109)
HCT VFR BLD AUTO: 37.6 % (ref 37–47)
HGB BLD-MCNC: 11.4 G/DL (ref 12–16)
IMM GRANULOCYTES # BLD: 0 K/UL
INR BLD: 3.3
LYMPHOCYTES # BLD: 2.2 K/UL (ref 1.1–4.5)
LYMPHOCYTES NFR BLD: 39.3 % (ref 20–40)
MCH RBC QN AUTO: 28.6 PG (ref 27–31)
MCHC RBC AUTO-ENTMCNC: 30.3 G/DL (ref 33–37)
MCV RBC AUTO: 94.2 FL (ref 81–99)
MONOCYTES # BLD: 0.6 K/UL (ref 0–0.9)
MONOCYTES NFR BLD: 11.1 % (ref 0–10)
NEUTROPHILS # BLD: 2.6 K/UL (ref 1.5–7.5)
NEUTS SEG NFR BLD: 47 % (ref 50–65)
PLATELET # BLD AUTO: 194 K/UL (ref 130–400)
PMV BLD AUTO: 11.6 FL (ref 9.4–12.3)
POTASSIUM SERPL-SCNC: 4.4 MMOL/L (ref 3.5–5)
PROT SERPL-MCNC: 7.8 G/DL (ref 6.6–8.7)
RBC # BLD AUTO: 3.99 M/UL (ref 4.2–5.4)
SODIUM SERPL-SCNC: 139 MMOL/L (ref 136–145)
TSH SERPL DL<=0.005 MIU/L-ACNC: 4.03 UIU/ML (ref 0.27–4.2)
WBC # BLD AUTO: 5.6 K/UL (ref 4.8–10.8)

## 2024-02-20 PROCEDURE — 93793 ANTICOAG MGMT PT WARFARIN: CPT | Performed by: INTERNAL MEDICINE

## 2024-02-20 RX ORDER — LEVOTHYROXINE SODIUM 0.07 MG/1
75 TABLET ORAL DAILY
Qty: 30 TABLET | Refills: 5 | Status: SHIPPED | OUTPATIENT
Start: 2024-02-20

## 2024-02-20 RX ORDER — CYANOCOBALAMIN 1000 UG/ML
1000 INJECTION, SOLUTION INTRAMUSCULAR; SUBCUTANEOUS ONCE
Status: COMPLETED | OUTPATIENT
Start: 2024-02-20 | End: 2024-02-20

## 2024-02-20 RX ADMIN — CYANOCOBALAMIN 1000 MCG: 1000 INJECTION, SOLUTION INTRAMUSCULAR; SUBCUTANEOUS at 15:04

## 2024-02-20 NOTE — PROGRESS NOTES
Pulses: Normal pulses.      Heart sounds: Normal heart sounds. No murmur heard.     No friction rub. No gallop.   Pulmonary:      Effort: Pulmonary effort is normal. No respiratory distress.      Breath sounds: Normal breath sounds. No stridor. No wheezing, rhonchi or rales.   Chest:      Chest wall: No tenderness.   Abdominal:      General: Abdomen is flat. Bowel sounds are normal. There is no distension.      Palpations: Abdomen is soft. There is no mass.      Tenderness: There is no abdominal tenderness. There is no right CVA tenderness, left CVA tenderness, guarding or rebound.      Hernia: No hernia is present.   Musculoskeletal:         General: Tenderness present. No swelling, deformity or signs of injury. Normal range of motion.      Cervical back: Normal range of motion. No rigidity. No muscular tenderness.      Lumbar back: Spasms present. No swelling or bony tenderness. Normal range of motion.        Back:       Right lower leg: No edema.      Left lower leg: No edema.      Comments:  She also has some pain on palpation of the right hip and lower lumbar spine.    Visual inspection:  Deformity/amputation: absent  Skin lesions/pre-ulcerative calluses: present to bottom of the left foot, the base of the right great toe and her left great toe  Edema: right- negative, left- negative    Sensory exam:  Monofilament sensation: normal  (minimum of 5 random plantar locations tested, avoiding callused areas - > 1 area with absence of sensation is + for neuropathy)    Plus at least one of the following:  Pulses: normal,   Pinprick: Intact  Proprioception: N/A  Vibration (128 Hz): N/A   Lymphadenopathy:      Cervical: No cervical adenopathy.   Skin:     General: Skin is warm and dry.      Capillary Refill: Capillary refill takes less than 2 seconds.      Coloration: Skin is not jaundiced or pale.      Findings: No bruising, erythema, lesion or rash.   Neurological:      General: No focal deficit present.      Mental

## 2024-02-20 NOTE — PROGRESS NOTES
Ms. Leny Stone was here today.   INR today:   Results for orders placed or performed in visit on 02/20/24   Protime-INR   Result Value Ref Range    INR 3.30      INR Goal: 2.0-3.0    Dosing Plan  As of 2/20/2024      TTR:  30.7 % (5.7 y)   Full warfarin instructions:  5 mg every day              PLAN: REDUCE DOSE TONIGHT TO 2.5 MG, THEN CONTINUE CURRENT DOSE OF 5 MG DAILY  SELF TEST AT HOME IN ONE WEEK.    Coumadin Clinic Hours  Monday           8:00am - 4:00pm  Tuesday 8:00am - 4:00pm  Wednesday  10:00am - 4:00pm  Thursday 8:00am - 4:00pm    IF IT'S AN EMERGENCY, PLEASE CALL 911 OR GO TO YOUR NEAREST EMERGENCY ROOM.    [facility] COUMADIN CLINIC [phone] (JOVANY'S DIRECT LINE)  IF UNABLE TO REACH COUMADIN CLINIC, PLEASE CALL YOUR DOCTOR,     INTERNAL  MEDICINE [phone].    PRIMARY CARE  [phone]    FAMILY MEDICINE  [phone]

## 2024-02-27 ASSESSMENT — PATIENT HEALTH QUESTIONNAIRE - PHQ9
7. TROUBLE CONCENTRATING ON THINGS, SUCH AS READING THE NEWSPAPER OR WATCHING TELEVISION: 0
10. IF YOU CHECKED OFF ANY PROBLEMS, HOW DIFFICULT HAVE THESE PROBLEMS MADE IT FOR YOU TO DO YOUR WORK, TAKE CARE OF THINGS AT HOME, OR GET ALONG WITH OTHER PEOPLE: 0
SUM OF ALL RESPONSES TO PHQ QUESTIONS 1-9: 4
8. MOVING OR SPEAKING SO SLOWLY THAT OTHER PEOPLE COULD HAVE NOTICED. OR THE OPPOSITE, BEING SO FIGETY OR RESTLESS THAT YOU HAVE BEEN MOVING AROUND A LOT MORE THAN USUAL: 0
3. TROUBLE FALLING OR STAYING ASLEEP: 0
1. LITTLE INTEREST OR PLEASURE IN DOING THINGS: 1
6. FEELING BAD ABOUT YOURSELF - OR THAT YOU ARE A FAILURE OR HAVE LET YOURSELF OR YOUR FAMILY DOWN: 0
2. FEELING DOWN, DEPRESSED OR HOPELESS: 1
SUM OF ALL RESPONSES TO PHQ QUESTIONS 1-9: 4
4. FEELING TIRED OR HAVING LITTLE ENERGY: 2
SUM OF ALL RESPONSES TO PHQ QUESTIONS 1-9: 4
9. THOUGHTS THAT YOU WOULD BE BETTER OFF DEAD, OR OF HURTING YOURSELF: 0
SUM OF ALL RESPONSES TO PHQ QUESTIONS 1-9: 4
5. POOR APPETITE OR OVEREATING: 0
SUM OF ALL RESPONSES TO PHQ9 QUESTIONS 1 & 2: 2

## 2024-02-27 ASSESSMENT — ENCOUNTER SYMPTOMS
SINUS PRESSURE: 0
NAUSEA: 0
COLOR CHANGE: 0
DIARRHEA: 0
VOICE CHANGE: 0
CONSTIPATION: 0
ABDOMINAL DISTENTION: 0
EYE ITCHING: 0
ABDOMINAL PAIN: 0
BACK PAIN: 1
RHINORRHEA: 0
APNEA: 0
TROUBLE SWALLOWING: 0
BLOOD IN STOOL: 0
CHEST TIGHTNESS: 0
WHEEZING: 0
VOMITING: 0
COUGH: 0
SINUS PAIN: 0
EYE DISCHARGE: 0
SHORTNESS OF BREATH: 0
STRIDOR: 0

## 2024-02-28 ENCOUNTER — ANTI-COAG VISIT (OUTPATIENT)
Dept: PRIMARY CARE CLINIC | Age: 74
End: 2024-02-28
Payer: MEDICARE

## 2024-02-28 DIAGNOSIS — Z79.01 ANTICOAGULATED ON COUMADIN: Primary | ICD-10-CM

## 2024-02-28 LAB — INR BLD: 4.9

## 2024-02-28 PROCEDURE — 93793 ANTICOAG MGMT PT WARFARIN: CPT | Performed by: INTERNAL MEDICINE

## 2024-02-28 NOTE — PROGRESS NOTES
HOME MONITORING REPORT    INR today:   Results for orders placed or performed in visit on 02/28/24   Protime-INR   Result Value Ref Range    INR 4.90        INR Goal: 2.0-3.0    Dosing Plan  As of 2/28/2024      TTR:  30.6 % (5.7 y)   Full warfarin instructions:  2/28: Hold; 2/29: 2.5 mg; 3/1: 2.5 mg; 3/2: 2.5 mg; Otherwise 5 mg every day              Amiodarone inhibits warfarin metabolism and is associated with major bleeding during warfarin therapy. Managing this drug-drug interaction (DDI) is challenging because of substantial interpatient variability in DDI magnitude.Mar 26, 2020     PLAN: Advised patient/caregiver to hold her dose tonight, reduce dose to 2.5 mg daily and recheck on Monday.  Patient/Caregiver voiced understanding  Amiodarone was started during recent hospitalization.  She is seeing cardiology 3/14/24. May need to seek an alternative blood thinner due to the drug interactions.    Electronically signed by Regina Curtis MD on 2/28/2024 at 11:21 AM

## 2024-03-04 ENCOUNTER — TELEPHONE (OUTPATIENT)
Dept: HEMATOLOGY | Age: 74
End: 2024-03-04

## 2024-03-04 NOTE — TELEPHONE ENCOUNTER
Called patient and reminded patient of their appointment on 3/6/2024 and patient confirmed they would be here.

## 2024-03-05 ENCOUNTER — TELEPHONE (OUTPATIENT)
Dept: INTERNAL MEDICINE | Age: 74
End: 2024-03-05

## 2024-03-05 DIAGNOSIS — D50.8 IRON DEFICIENCY ANEMIA SECONDARY TO INADEQUATE DIETARY IRON INTAKE: Primary | ICD-10-CM

## 2024-03-05 NOTE — PROGRESS NOTES
Vascular: No JVD.      Trachea: Trachea normal. No tracheal deviation.   Cardiovascular:      Rate and Rhythm: Normal rate and regular rhythm.      Pulses: Normal pulses.      Heart sounds: Normal heart sounds.   Pulmonary:      Effort: Pulmonary effort is normal. No respiratory distress.      Breath sounds: Normal breath sounds. No wheezing or rales.   Chest:      Chest wall: No tenderness.   Abdominal:      General: Bowel sounds are normal. There is no distension.      Palpations: Abdomen is soft. There is no mass.      Tenderness: There is no abdominal tenderness. There is no guarding.   Musculoskeletal:         General: No tenderness or deformity.      Cervical back: Normal range of motion and neck supple.      Right lower leg: Edema (Chronic) present.      Left lower leg: Edema (Chronic) present.      Comments: Range of motion within normal limits x4 extremities   Skin:     General: Skin is warm.      Findings: No bruising, erythema or rash.   Neurological:      Mental Status: She is alert and oriented to person, place, and time.      Cranial Nerves: No cranial nerve deficit.      Coordination: Coordination normal.   Psychiatric:         Behavior: Behavior normal.         Thought Content: Thought content normal.       Labs reviewed today:  Lab Results   Component Value Date    WBC 4.23 03/06/2024    HGB 11.6 03/06/2024    HCT 36.3 03/06/2024    MCV 91.0 03/06/2024     (L) 03/06/2024     Lab Results   Component Value Date    NEUTROABS 2.06 03/06/2024       ASSESSMENT/PLAN:      1. Iron deficiency anemia with concern for malabsorption, hemoglobin 11.6 with a hematocrit of 36.3, stable current recommendation is oral iron supplement twice daily.      Reports compliant with oral iron    09/27/2023 Serology results continue to show positive response to oral iron  TIBC 353  Saturation 15%  Ferritin 141.3  B12:  994    Labs to be obtained today:  - CBC, Iron and TIBC; Ferritin    -Continue iron supplement,

## 2024-03-06 ENCOUNTER — OFFICE VISIT (OUTPATIENT)
Dept: HEMATOLOGY | Age: 74
End: 2024-03-06

## 2024-03-06 ENCOUNTER — HOSPITAL ENCOUNTER (OUTPATIENT)
Dept: INFUSION THERAPY | Age: 74
Discharge: HOME | End: 2024-03-06

## 2024-03-06 ENCOUNTER — TELEPHONE (OUTPATIENT)
Dept: INTERNAL MEDICINE | Age: 74
End: 2024-03-06

## 2024-03-06 VITALS
HEART RATE: 77 BPM | BODY MASS INDEX: 37.51 KG/M2 | DIASTOLIC BLOOD PRESSURE: 82 MMHG | WEIGHT: 239 LBS | HEIGHT: 67 IN | TEMPERATURE: 97.9 F | OXYGEN SATURATION: 98 % | SYSTOLIC BLOOD PRESSURE: 120 MMHG

## 2024-03-06 DIAGNOSIS — D50.8 IRON DEFICIENCY ANEMIA SECONDARY TO INADEQUATE DIETARY IRON INTAKE: ICD-10-CM

## 2024-03-06 DIAGNOSIS — D50.9 IRON DEFICIENCY ANEMIA, UNSPECIFIED IRON DEFICIENCY ANEMIA TYPE: ICD-10-CM

## 2024-03-06 DIAGNOSIS — D69.6 THROMBOCYTOPENIA (HCC): Primary | ICD-10-CM

## 2024-03-06 DIAGNOSIS — Z79.01 ANTICOAGULATED ON COUMADIN: ICD-10-CM

## 2024-03-06 DIAGNOSIS — D69.6 THROMBOCYTOPENIA (HCC): ICD-10-CM

## 2024-03-06 LAB
BASOPHILS # BLD: 0.04 K/UL (ref 0.01–0.08)
BASOPHILS NFR BLD: 0.9 % (ref 0.1–1.2)
EOSINOPHIL # BLD: 0.11 K/UL (ref 0.04–0.54)
EOSINOPHIL NFR BLD: 2.6 % (ref 0.7–7)
ERYTHROCYTE [DISTWIDTH] IN BLOOD BY AUTOMATED COUNT: 16.3 % (ref 11.7–14.4)
FERRITIN SERPL-MCNC: 166.5 NG/ML (ref 13–150)
HCT VFR BLD AUTO: 36.3 % (ref 34.1–44.9)
HGB BLD-MCNC: 11.6 G/DL (ref 11.2–15.7)
IRON SATN MFR SERPL: 20 % (ref 14–50)
IRON SERPL-MCNC: 58 UG/DL (ref 37–145)
LYMPHOCYTES # BLD: 1.54 K/UL (ref 1.18–3.74)
LYMPHOCYTES NFR BLD: 36.4 % (ref 19.3–53.1)
MCH RBC QN AUTO: 29.1 PG (ref 25.6–32.2)
MCHC RBC AUTO-ENTMCNC: 32 G/DL (ref 32.3–35.5)
MCV RBC AUTO: 91 FL (ref 79.4–94.8)
MONOCYTES # BLD: 0.46 K/UL (ref 0.24–0.82)
MONOCYTES NFR BLD: 10.9 % (ref 4.7–12.5)
NEUTROPHILS # BLD: 2.06 K/UL (ref 1.56–6.13)
NEUTS SEG NFR BLD: 48.7 % (ref 34–71.1)
PLATELET # BLD AUTO: 167 K/UL (ref 182–369)
PMV BLD AUTO: 11.4 FL (ref 7.4–10.4)
RBC # BLD AUTO: 3.99 M/UL (ref 3.93–5.22)
TIBC SERPL-MCNC: 288 UG/DL (ref 250–400)
WBC # BLD AUTO: 4.23 K/UL (ref 3.98–10.04)

## 2024-03-06 PROCEDURE — 36415 COLL VENOUS BLD VENIPUNCTURE: CPT

## 2024-03-06 PROCEDURE — 3079F DIAST BP 80-89 MM HG: CPT | Performed by: NURSE PRACTITIONER

## 2024-03-06 PROCEDURE — 1123F ACP DISCUSS/DSCN MKR DOCD: CPT | Performed by: NURSE PRACTITIONER

## 2024-03-06 PROCEDURE — 99213 OFFICE O/P EST LOW 20 MIN: CPT | Performed by: NURSE PRACTITIONER

## 2024-03-06 PROCEDURE — 99212 OFFICE O/P EST SF 10 MIN: CPT

## 2024-03-06 PROCEDURE — 85025 COMPLETE CBC W/AUTO DIFF WBC: CPT

## 2024-03-06 PROCEDURE — 3074F SYST BP LT 130 MM HG: CPT | Performed by: NURSE PRACTITIONER

## 2024-03-06 RX ORDER — QUINIDINE GLUCONATE 324 MG
1 TABLET, EXTENDED RELEASE ORAL 2 TIMES DAILY
Qty: 60 TABLET | Refills: 10 | Status: SHIPPED | OUTPATIENT
Start: 2024-03-06

## 2024-03-06 NOTE — TELEPHONE ENCOUNTER
Her Holter monitor shows mostly atrial fibrillation.  There was 1 run of supraventricular tachycardia.  She does have an upcoming follow-up with cardiology.

## 2024-03-09 ASSESSMENT — ENCOUNTER SYMPTOMS
WHEEZING: 0
SHORTNESS OF BREATH: 0
CONSTIPATION: 0
EYE REDNESS: 0
EYE PAIN: 0
GASTROINTESTINAL NEGATIVE: 1
EYES NEGATIVE: 1
COUGH: 0
BACK PAIN: 0
RESPIRATORY NEGATIVE: 1
VOMITING: 0
BLOOD IN STOOL: 0
DIARRHEA: 0
ABDOMINAL PAIN: 0
NAUSEA: 0
EYE DISCHARGE: 0
SORE THROAT: 0

## 2024-03-11 ENCOUNTER — OFFICE VISIT (OUTPATIENT)
Dept: NEUROLOGY | Age: 74
End: 2024-03-11
Payer: MEDICARE

## 2024-03-11 ENCOUNTER — ANTI-COAG VISIT (OUTPATIENT)
Dept: INTERNAL MEDICINE | Age: 74
End: 2024-03-11
Payer: MEDICARE

## 2024-03-11 VITALS
HEART RATE: 64 BPM | WEIGHT: 239 LBS | HEIGHT: 67 IN | SYSTOLIC BLOOD PRESSURE: 151 MMHG | DIASTOLIC BLOOD PRESSURE: 86 MMHG | BODY MASS INDEX: 37.51 KG/M2

## 2024-03-11 DIAGNOSIS — Z79.01 ANTICOAGULATED ON COUMADIN: Primary | ICD-10-CM

## 2024-03-11 DIAGNOSIS — Z78.9 DIFFICULTY USING BIPHASIC POSITIVE AIRWAY PRESSURE (BIPAP) MACHINE: ICD-10-CM

## 2024-03-11 DIAGNOSIS — R40.0 SOMNOLENCE, DAYTIME: ICD-10-CM

## 2024-03-11 DIAGNOSIS — R06.83 SNORING: ICD-10-CM

## 2024-03-11 DIAGNOSIS — G47.31 COMPLEX SLEEP APNEA SYNDROME: ICD-10-CM

## 2024-03-11 DIAGNOSIS — G47.33 OBSTRUCTIVE SLEEP APNEA: Primary | ICD-10-CM

## 2024-03-11 DIAGNOSIS — R06.81 WITNESSED APNEIC SPELLS: ICD-10-CM

## 2024-03-11 LAB — INR BLD: 3.9

## 2024-03-11 PROCEDURE — 93793 ANTICOAG MGMT PT WARFARIN: CPT | Performed by: INTERNAL MEDICINE

## 2024-03-11 PROCEDURE — 3017F COLORECTAL CA SCREEN DOC REV: CPT | Performed by: PHYSICIAN ASSISTANT

## 2024-03-11 PROCEDURE — G8417 CALC BMI ABV UP PARAM F/U: HCPCS | Performed by: PHYSICIAN ASSISTANT

## 2024-03-11 PROCEDURE — G8484 FLU IMMUNIZE NO ADMIN: HCPCS | Performed by: PHYSICIAN ASSISTANT

## 2024-03-11 PROCEDURE — 3077F SYST BP >= 140 MM HG: CPT | Performed by: PHYSICIAN ASSISTANT

## 2024-03-11 PROCEDURE — 99203 OFFICE O/P NEW LOW 30 MIN: CPT | Performed by: PHYSICIAN ASSISTANT

## 2024-03-11 PROCEDURE — 1036F TOBACCO NON-USER: CPT | Performed by: PHYSICIAN ASSISTANT

## 2024-03-11 PROCEDURE — G8427 DOCREV CUR MEDS BY ELIG CLIN: HCPCS | Performed by: PHYSICIAN ASSISTANT

## 2024-03-11 PROCEDURE — 1111F DSCHRG MED/CURRENT MED MERGE: CPT | Performed by: PHYSICIAN ASSISTANT

## 2024-03-11 PROCEDURE — 1123F ACP DISCUSS/DSCN MKR DOCD: CPT | Performed by: PHYSICIAN ASSISTANT

## 2024-03-11 PROCEDURE — G8399 PT W/DXA RESULTS DOCUMENT: HCPCS | Performed by: PHYSICIAN ASSISTANT

## 2024-03-11 PROCEDURE — 1090F PRES/ABSN URINE INCON ASSESS: CPT | Performed by: PHYSICIAN ASSISTANT

## 2024-03-11 PROCEDURE — 3079F DIAST BP 80-89 MM HG: CPT | Performed by: PHYSICIAN ASSISTANT

## 2024-03-11 NOTE — PATIENT INSTRUCTIONS
long-term follow-up should be established. Annual visits are reasonable, with more frequent visits in between if new issues arise. The purpose of long-term follow-up is to assess usage and monitor for recurrent MER, new side effects, air leakage, and fluctuations in body weight.    WHERE TO GET MORE INFORMATION -- Your healthcare provider is the best source of information for questions and concerns related to your medical problem.    Organizations  American Sleep Apnea Association  Provides information about sleep apnea to the public, publishes a newsletter, and serves as an advocate for people with the disorder.  [address] [place] [address]  [address]   [place] [address]   osmany@[email or web address]   http://www.sleepapnea.org   Tel: [phone]   Fax: [phone]   National Sleep Foundation  National nonprofit organization that works to improve public health and safety by promoting public understanding of sleep and sleep disorders. Supports sleep-related education, research, and advocacy; produces and distributes educational materials to the public and healthcare professionals; and offers postdoctoral fellowships and grants for sleep researchers.  [address] [place] [address]  [address]   [place] [address]   nsf@[email or web address]   http://www.sleepfoundation.org   Tel: [phone]   Fax: [phone]    Important information:  Medicare/private insurance CPAP/BiPAP/APAP requirements:  Medicare/private insurance has specific requirements for PAP compliance that must be met during the first 90 days of use to continue coverage for CPAP/BiPAP/APAP  from day 91 and beyond. The policy requires that patients use a PAP device 4 hours per 24 hour period, at least 70% of the time over a 30 day period. This data must be downloaded as a report direct from the PAP devices. This is called a compliance download.  Your PAP supplier will assist you in this matter.     Note:  Where applicable, we will utilize PAP device efficiency
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
## 2024-03-11 NOTE — PROGRESS NOTES
REVIEW OF SYSTEMS    Constitutional: []Fever []Sweats []Chills [] Recent Injury   [x] Denies all unless marked  HENT:[]Headache  [] Head Injury  [] Sore Throat  [] Ear Pain  [] Dizziness [] Hearing Loss   [x] Denies all unless marked  Musculoskeletal: [] Arthralgia  [] Myalgias [] Muscle cramps  [] Muscle twitches   [x] Denies all unless marked   Spine:  [] Neck pain  [] Back pain  [] Sciatica  [x] Denies all unless marked  Neurological:[] Visual Disturbance [] Double Vision [] Slurred Speech [] Trouble swallowing  [] Vertigo [] Tingling [] Numbness [] Weakness [] Loss of Balance   [] Loss of Consciousness [] Memory Loss [] Seizures  [x] Denies all unless marked  Psychiatric/Behavioral:[] Depression [] Anxiety  [x] Denies all unless marked  Sleep: []  Insomnia [x] Sleep Disturbance [x] Snoring [] Restless Legs [x] Daytime Sleepiness [x] Sleep Apnea  [x] Denies all unless marked

## 2024-03-11 NOTE — PROGRESS NOTES
HOME MONITORING REPORT    INR today:   Results for orders placed or performed in visit on 03/11/24   Protime-INR   Result Value Ref Range    INR 3.90        INR Goal: 2.0-3.0    Dosing Plan  As of 3/11/2024      TTR:  30.6 % (5.8 y)   Full warfarin instructions:  5 mg every day                I called and confirmed the patient is taking 2.5 MG on Sunday then 5 MG all other days. Please dose the patient and advise when she is to test again. This result was from 3/4/24 but sent to MD INR today 3/11/24.    Plan: I called the patient to let her know per Dr. Curtis she is to hold her dose for 3/11/24 then resume her current dose. She is to retest on 3/15/24. The patient voiced understanding.

## 2024-03-13 LAB — INR BLD: 1.6

## 2024-03-14 ENCOUNTER — OFFICE VISIT (OUTPATIENT)
Dept: CARDIOLOGY CLINIC | Age: 74
End: 2024-03-14
Payer: MEDICARE

## 2024-03-14 ENCOUNTER — ANTI-COAG VISIT (OUTPATIENT)
Dept: INTERNAL MEDICINE | Age: 74
End: 2024-03-14
Payer: MEDICARE

## 2024-03-14 VITALS
HEART RATE: 66 BPM | DIASTOLIC BLOOD PRESSURE: 82 MMHG | WEIGHT: 237 LBS | BODY MASS INDEX: 37.2 KG/M2 | SYSTOLIC BLOOD PRESSURE: 132 MMHG | HEIGHT: 67 IN

## 2024-03-14 DIAGNOSIS — I20.89 STABLE ANGINA (HCC): ICD-10-CM

## 2024-03-14 DIAGNOSIS — Z79.01 ANTICOAGULATED ON COUMADIN: Primary | ICD-10-CM

## 2024-03-14 DIAGNOSIS — I10 PRIMARY HYPERTENSION: ICD-10-CM

## 2024-03-14 DIAGNOSIS — Z95.0 PACEMAKER: ICD-10-CM

## 2024-03-14 DIAGNOSIS — Z79.01 CHRONIC ANTICOAGULATION: ICD-10-CM

## 2024-03-14 DIAGNOSIS — I49.5 SSS (SICK SINUS SYNDROME) (HCC): ICD-10-CM

## 2024-03-14 DIAGNOSIS — Z79.899 LONG TERM CURRENT USE OF ANTIARRHYTHMIC DRUG: ICD-10-CM

## 2024-03-14 DIAGNOSIS — I34.0 NONRHEUMATIC MITRAL VALVE REGURGITATION: ICD-10-CM

## 2024-03-14 DIAGNOSIS — I48.0 PAROXYSMAL ATRIAL FIBRILLATION (HCC): Primary | ICD-10-CM

## 2024-03-14 PROCEDURE — 1036F TOBACCO NON-USER: CPT | Performed by: INTERNAL MEDICINE

## 2024-03-14 PROCEDURE — 3079F DIAST BP 80-89 MM HG: CPT | Performed by: INTERNAL MEDICINE

## 2024-03-14 PROCEDURE — G8484 FLU IMMUNIZE NO ADMIN: HCPCS | Performed by: INTERNAL MEDICINE

## 2024-03-14 PROCEDURE — G8417 CALC BMI ABV UP PARAM F/U: HCPCS | Performed by: INTERNAL MEDICINE

## 2024-03-14 PROCEDURE — 1111F DSCHRG MED/CURRENT MED MERGE: CPT | Performed by: INTERNAL MEDICINE

## 2024-03-14 PROCEDURE — 1090F PRES/ABSN URINE INCON ASSESS: CPT | Performed by: INTERNAL MEDICINE

## 2024-03-14 PROCEDURE — 93280 PM DEVICE PROGR EVAL DUAL: CPT | Performed by: INTERNAL MEDICINE

## 2024-03-14 PROCEDURE — G8427 DOCREV CUR MEDS BY ELIG CLIN: HCPCS | Performed by: INTERNAL MEDICINE

## 2024-03-14 PROCEDURE — 93793 ANTICOAG MGMT PT WARFARIN: CPT | Performed by: INTERNAL MEDICINE

## 2024-03-14 PROCEDURE — 3017F COLORECTAL CA SCREEN DOC REV: CPT | Performed by: INTERNAL MEDICINE

## 2024-03-14 PROCEDURE — 3075F SYST BP GE 130 - 139MM HG: CPT | Performed by: INTERNAL MEDICINE

## 2024-03-14 PROCEDURE — G8399 PT W/DXA RESULTS DOCUMENT: HCPCS | Performed by: INTERNAL MEDICINE

## 2024-03-14 PROCEDURE — 99214 OFFICE O/P EST MOD 30 MIN: CPT | Performed by: INTERNAL MEDICINE

## 2024-03-14 PROCEDURE — 1123F ACP DISCUSS/DSCN MKR DOCD: CPT | Performed by: INTERNAL MEDICINE

## 2024-03-14 RX ORDER — AMIODARONE HYDROCHLORIDE 100 MG/1
100 TABLET ORAL DAILY
COMMUNITY

## 2024-03-14 NOTE — PROGRESS NOTES
HOME MONITORING REPORT    INR today:   Results for orders placed or performed in visit on 03/14/24   Protime-INR   Result Value Ref Range    INR 1.60        INR Goal: 2.0-3.0    Dosing Plan  As of 3/14/2024      TTR:  30.6 % (5.8 y)   Full warfarin instructions:  5 mg every day                Confirmed the patient held her coumadin on 3/11/24, then resumed 5 MG on 3/12/24. Please dose the patient and advise when she is to retest.     Plan: I called and let the patient know to increase her dose of coumadin today to 7.5 MG then resume her current dose. Recheck in one week. The patient voiced understanding.       Electronically signed by Regina Curtis MD on 3/14/2024 at 1:43 PM

## 2024-03-14 NOTE — PROGRESS NOTES
Pacemaker interrogated  Presenting rhythm:  AP/VS, AP 90.6%,  0.8%  Battey voltage 5.5 years  Lead status:  Lead impedance within range and stable  Sensing:  P waves 2.1 mV,  R waves 4.0 mV  Thresholds:  Atrial 0.750 V @ 0.4ms, ventricular 1.250 V @ 0.4ms  Observations:  1 monitored episode NSVT, 7 monitored episodes AT/AF, nothing since 02/10/24  See scanned report for details  Reprogramming for sensitivity and threshold testing  Next [facility] appointment:  06/17/24    
Unstable Housing in the Last Year: No       Physical Examination:  /82   Pulse 66   Ht 1.702 m (5' 7\")   Wt 107.5 kg (237 lb)   BMI 37.12 kg/m²   Physical Exam  Constitutional:       Appearance: She is well-developed.   Neck:      Vascular: No carotid bruit or JVD.   Cardiovascular:      Rate and Rhythm: Normal rate and regular rhythm.      Heart sounds: Normal heart sounds. No murmur heard.     No friction rub. No gallop.   Pulmonary:      Effort: Pulmonary effort is normal. No respiratory distress.      Breath sounds: Normal breath sounds. No wheezing or rales.   Abdominal:      General: There is no distension.      Tenderness: There is no abdominal tenderness.   Lymphadenopathy:      Cervical: No cervical adenopathy.   Skin:     General: Skin is warm and dry.             ASSESSMENT:     Diagnosis Orders   1. Paroxysmal atrial fibrillation (HCC)  EKG 12 lead      2. SSS (sick sinus syndrome) (HCC)        3. Pacemaker        4. Nonrheumatic mitral valve regurgitation        5. Primary hypertension        6. Stable angina        7. Chronic anticoagulation        8. Long term current use of antiarrhythmic drug            PLAN:  Orders Placed This Encounter   Procedures    EKG 12 lead     No orders of the defined types were placed in this encounter.        Continue warfarin as directed  Continue amiodarone 200 mg p.o. once daily  Metoprolol 25 p.o. twice daily  Bumex 2 mg daily as directed  Crestor 5 mg a day  Lisinopril 10 mg a day  Assessment in 3 months    Return in about 3 months (around 6/14/2024) for return to Dr. Ayon only.      Tyrone Ayon MD 3/14/2024 3:52 PM CDT    [facility]      Thisdictation was generated by voice recognition computer software.  Although all attempts are made to edit the dictation for accuracy, there may be errors in the transcription that are not intended.

## 2024-03-18 ENCOUNTER — TELEPHONE (OUTPATIENT)
Dept: CARDIOLOGY | Facility: CLINIC | Age: 74
End: 2024-03-18

## 2024-03-18 PROBLEM — R06.83 SNORING: Status: ACTIVE | Noted: 2024-03-18

## 2024-03-18 PROBLEM — R40.0 SOMNOLENCE, DAYTIME: Status: ACTIVE | Noted: 2024-03-18

## 2024-03-18 PROBLEM — R06.81 WITNESSED APNEIC SPELLS: Status: ACTIVE | Noted: 2024-03-18

## 2024-03-18 PROBLEM — Z78.9 DIFFICULTY USING BIPHASIC POSITIVE AIRWAY PRESSURE (BIPAP) MACHINE: Status: ACTIVE | Noted: 2024-03-18

## 2024-03-18 NOTE — TELEPHONE ENCOUNTER
RN received a transfer request in [employer] to transfer patient's remote monitoring to [facility].  RN contacted patient, who stated she was transferring because we were now out of network for her insurance.  RN notified patient that, starting 4.1.24, [employer] will consider [facility] providers to be IN NETWORK.  Per patient, she saw Dr. Crews recently inpatient at [facility] because she went into AF and had to have a cardioversion.  RN accepted transfer request to [facility] so patient could send a follow up report post-cardioversion.  Patient will contact RN to notify us if she plans to transfer back to us.  RN will not cancel patient's appointments scheduled in September 2024 until patient notifies our clinic of the plan.

## 2024-04-01 ENCOUNTER — HOSPITAL ENCOUNTER (OUTPATIENT)
Dept: SLEEP CENTER | Age: 74
Discharge: HOME | End: 2024-04-03
Payer: MEDICARE

## 2024-04-01 PROCEDURE — 95810 POLYSOM 6/> YRS 4/> PARAM: CPT

## 2024-04-02 NOTE — PROGRESS NOTES
[facility]  [address] [place]  [address]  Phone [phone] Fax [phone]     Sleep Study Technician Review    Patient Name:  Leny Stone  :   1950  Referring Provider: Albina Muñiz PA    Brief History:  Leny Stone is a 73 y.o. female with a history of Afib, Anxiety, COPD, Depression, Diabetes, Fibromylagia, GERDS, Kidney disease stage 2, MER, Obesity and MER/CSA, Obesity and pacemaker placed who has been referred for a sleep study. The last PSG,  revealed an AHI of 53.6. She was also noted to have central events. She is prescribed BiPAP ST. She was also diagnosed with PLMD. It is noted in the notes from CHANELLE Carreon that she had not been using PAP secondary to multiple different socioeconomic factors.     [facility] Fall Risk Assessment  Have you fallen in the past year? YES[x] NO[]  Do you feel unsteady when standing or walking? YES[] NO[x]  Are you worried about falling? YES[] NO[x]   aFall Risk screening requirement has been met  At risk due to:  unsteady gait    Height:   5' 7\"  Weight:  239 lbs  BMI: 37.4  Neck Circ: 13\"  Mallampati 4  ESS:     Type of Study: PSG  Time Stage Position Snore Hypopnea Obs Apnea Azra Apnea PAP O2   2100 Awake Supine No No No No  RA   2200 Awake Supine No No No No  RA   2300 2 Left No No No No  RA   2400 2 Right No Yes No No  RA   0100 2 Left No No No No  RA   0200 1 Supine No No No Yes  RA   0300 2 Supine No No No Yes  RA   0400 REM Supine No Yes No No  RA   0420 Awake Supine No No No No  RA   Summary: Pt has a pacemaker placed. Pt stated that she has used bipap in the past. Pt stated that she would like to try again. Pt has some events with snoring. Pt voiced no complaints.    DME: [employer]     The study was reviewed briefly with Leny Stone.  She will be notified of the formal results and recommendations after the study is scored and interpreted.  The report will be sent to his referring

## 2024-04-03 ENCOUNTER — ANTI-COAG VISIT (OUTPATIENT)
Dept: INTERNAL MEDICINE | Age: 74
End: 2024-04-03
Payer: MEDICARE

## 2024-04-03 DIAGNOSIS — Z79.01 ANTICOAGULATED ON COUMADIN: Primary | ICD-10-CM

## 2024-04-03 LAB
INR BLD: 1.5
INR BLD: 1.6
INR BLD: 1.7

## 2024-04-03 PROCEDURE — 93793 ANTICOAG MGMT PT WARFARIN: CPT | Performed by: INTERNAL MEDICINE

## 2024-04-03 NOTE — PROGRESS NOTES
HOME MONITORING REPORT    INR today:   Results for orders placed or performed in visit on 04/03/24   Protime-INR   Result Value Ref Range    INR 1.50        INR Goal: 2.0-3.0    Dosing Plan  As of 4/3/2024      TTR:  30.3 % (5.8 y)   Full warfarin instructions:  5 mg every day                The patient reported that she missed her coumadin dose 2 days this week. She has had no changes to her medication or her diet. The patient sent INR readings in for the last 3 weeks at one time. I asked the patient why she hasn't been sending them in every week so her PCP could help control her coumadin dosing. The patient stated that she forgets to send the reading in. I let the patient know how important it is to send those readings in so she won't have any more critical values and we can keep her on a healthy path with her coumadin readings. The patient voiced understanding.     Please dose the patient.

## 2024-04-03 NOTE — PROGRESS NOTES
HOME MONITORING REPORT    INR today:   Results for orders placed or performed in visit on 04/03/24   Protime-INR   Result Value Ref Range    INR 1.70        INR Goal: 2.0-3.0    Dosing Plan  As of 4/3/2024      TTR:  30.3 % (5.8 y)   Full warfarin instructions:  5 mg every day                I called the patient and advised her of the importance of sending her INR results in weekly.

## 2024-04-03 NOTE — PROGRESS NOTES
HOME MONITORING REPORT    INR today:   Results for orders placed or performed in visit on 04/03/24   Protime-INR   Result Value Ref Range    INR 1.60        INR Goal: 2.0-3.0    Dosing Plan  As of 4/3/2024      TTR:  30.3 % (5.8 y)   Full warfarin instructions:  5 mg every day                I called the patient and advised her of the importance of sending her INR results in weekly. This result was sent in on 4/3/24.    Take extra 5 mg tonight and then resume all previous orders

## 2024-04-10 ENCOUNTER — ANTI-COAG VISIT (OUTPATIENT)
Dept: INTERNAL MEDICINE | Age: 74
End: 2024-04-10
Payer: MEDICARE

## 2024-04-10 DIAGNOSIS — Z79.01 ANTICOAGULATED ON COUMADIN: Primary | ICD-10-CM

## 2024-04-10 LAB — INR BLD: 3.1

## 2024-04-10 PROCEDURE — 93793 ANTICOAG MGMT PT WARFARIN: CPT | Performed by: INTERNAL MEDICINE

## 2024-04-10 NOTE — PROGRESS NOTES
HOME MONITORING REPORT    INR today:   Results for orders placed or performed in visit on 04/10/24   Protime-INR   Result Value Ref Range    INR 3.10        INR Goal: 2.0-3.0    Dosing Plan  As of 4/10/2024      TTR:  30.4 % (5.9 y)   Full warfarin instructions:  5 mg every day                I called the patient and confirmed she she has taken  5 Mg of coumadin all week. No diet or medication changes at this time. Please dose the patient.    Per Dr. Curtis I advised the patient to hold her coumadin for 4/10/24 then resume her current coumadin dose. Re-test on 4/17/24.

## 2024-04-17 ENCOUNTER — ANTI-COAG VISIT (OUTPATIENT)
Dept: INTERNAL MEDICINE | Age: 74
End: 2024-04-17
Payer: MEDICARE

## 2024-04-17 DIAGNOSIS — Z79.01 ANTICOAGULATED ON COUMADIN: Primary | ICD-10-CM

## 2024-04-17 LAB — INR BLD: 2

## 2024-04-17 PROCEDURE — 93793 ANTICOAG MGMT PT WARFARIN: CPT | Performed by: INTERNAL MEDICINE

## 2024-04-17 NOTE — PROGRESS NOTES
HOME MONITORING REPORT    INR today:   Results for orders placed or performed in visit on 04/17/24   Protime-INR   Result Value Ref Range    INR 2.00        INR Goal: 2.0-3.0    Dosing Plan  As of 4/17/2024      TTR:  30.6 % (5.9 y)   Full warfarin instructions:  5 mg every day                PLAN: Patient aware to continue current dose and recheck in one week or as ordered.

## 2024-04-25 ENCOUNTER — ANTI-COAG VISIT (OUTPATIENT)
Dept: INTERNAL MEDICINE | Age: 74
End: 2024-04-25
Payer: MEDICARE

## 2024-04-25 DIAGNOSIS — G62.9 NEUROPATHY: ICD-10-CM

## 2024-04-25 DIAGNOSIS — Z79.01 ANTICOAGULATED ON COUMADIN: Primary | ICD-10-CM

## 2024-04-25 LAB — INR BLD: 3.9

## 2024-04-25 PROCEDURE — 93793 ANTICOAG MGMT PT WARFARIN: CPT | Performed by: INTERNAL MEDICINE

## 2024-04-25 RX ORDER — GABAPENTIN 300 MG/1
CAPSULE ORAL
Qty: 270 CAPSULE | Refills: 0 | Status: SHIPPED | OUTPATIENT
Start: 2024-04-25 | End: 2024-07-24

## 2024-04-25 NOTE — TELEPHONE ENCOUNTER
Leny Stone called to request a refill on her medication.      Last office visit : 2/20/2024   Next office visit : 5/8/2024     Last UDS:   Amphetamine Screen, Urine   Date Value Ref Range Status   07/07/2021 NEG  Final     Barbiturate Screen, Urine   Date Value Ref Range Status   07/07/2021 NEG  Final     Benzodiazepine Screen, Urine   Date Value Ref Range Status   07/07/2021 NEG  Final     Buprenorphine Urine   Date Value Ref Range Status   07/07/2021 NEG  Final     Cocaine Metabolite Screen, Urine   Date Value Ref Range Status   07/07/2021 NEG  Final     Gabapentin Screen, Urine   Date Value Ref Range Status   07/07/2021 NEG  Final     MDMA, Urine   Date Value Ref Range Status   07/07/2021 NEG  Final     Methamphetamine, Urine   Date Value Ref Range Status   07/07/2021 NEG  Final     Opiate Scrn, Ur   Date Value Ref Range Status   07/07/2021 NEG  Final     Oxycodone Screen, Ur   Date Value Ref Range Status   07/07/2021 NEG  Final     PCP Screen, Urine   Date Value Ref Range Status   07/07/2021 NEG  Final     Propoxyphene Screen, Urine   Date Value Ref Range Status   07/07/2021 NEG  Final     THC Screen, Urine   Date Value Ref Range Status   07/07/2021 NEG  Final     Tricyclic Antidepressants, Urine   Date Value Ref Range Status   07/07/2021 NEG  Final       Last Jose: 10/17/2023  Medication Contract: 10/11/2021     Requested Prescriptions     Pending Prescriptions Disp Refills    gabapentin (NEURONTIN) 300 MG capsule [Pharmacy Med Name: GABAPENTIN 300 MG Capsule] 270 capsule      Sig: TAKE 1 CAPSULE THREE TIMES DAILY         Please approve or refuse this medication.   Sonal Payne LPN

## 2024-04-25 NOTE — PROGRESS NOTES
Ms. Leny Stone was here today.   INR today:   Results for orders placed or performed in visit on 04/25/24   Protime-INR   Result Value Ref Range    INR 3.90      INR Goal: 2.0-3.0    Dosing Plan  As of 4/25/2024      TTR:  30.7 % (5.9 y)   Full warfarin instructions:  5 mg every day              I confirmed the patient's coumadin dose as 5 Mg every day. The patient reported that she had a big plate of cabbage the last two days. No medication changes. Please dose the patient.      IF IT'S AN EMERGENCY, PLEASE CALL 911 OR GO TO YOUR NEAREST EMERGENCY ROOM.    [facility] CARE COUMADIN CLINIC [phone] (JOVANY'S DIRECT LINE)  IF UNABLE TO REACH COUMADIN CLINIC, PLEASE CALL YOUR DOCTOR,     INTERNAL  MEDICINE [phone].    PRIMARY CARE  [phone]    FAMILY MEDICINE  [phone]

## 2024-04-25 NOTE — PROGRESS NOTES
Per Dr. Curtis, I called the patient and advised her to hold her coumadin on 4/25 and 4/26 then resume her current dose of coumadin. The patient voiced understanding.

## 2024-04-26 DIAGNOSIS — G47.31 CENTRAL SLEEP APNEA: Primary | ICD-10-CM

## 2024-04-26 DIAGNOSIS — G47.33 SLEEP APNEA, OBSTRUCTIVE: ICD-10-CM

## 2024-04-30 ENCOUNTER — TELEPHONE (OUTPATIENT)
Dept: NEUROLOGY | Age: 74
End: 2024-04-30

## 2024-04-30 DIAGNOSIS — D50.9 IRON DEFICIENCY ANEMIA, UNSPECIFIED IRON DEFICIENCY ANEMIA TYPE: ICD-10-CM

## 2024-04-30 DIAGNOSIS — E03.9 HYPOTHYROIDISM, UNSPECIFIED TYPE: ICD-10-CM

## 2024-04-30 DIAGNOSIS — E87.6 HYPOKALEMIA: ICD-10-CM

## 2024-04-30 DIAGNOSIS — Z91.09 ENVIRONMENTAL ALLERGIES: ICD-10-CM

## 2024-04-30 RX ORDER — WARFARIN SODIUM 5 MG/1
TABLET ORAL
Qty: 90 TABLET | Refills: 0 | Status: SHIPPED | OUTPATIENT
Start: 2024-04-30

## 2024-04-30 RX ORDER — FEXOFENADINE HCL 180 MG/1
180 TABLET ORAL DAILY
Qty: 90 TABLET | Refills: 1 | Status: SHIPPED | OUTPATIENT
Start: 2024-04-30

## 2024-04-30 RX ORDER — ASPIRIN 81 MG/1
TABLET ORAL
Qty: 90 TABLET | Refills: 0 | OUTPATIENT
Start: 2024-04-30

## 2024-04-30 RX ORDER — LEVOTHYROXINE SODIUM 0.07 MG/1
TABLET ORAL
Qty: 90 TABLET | Refills: 0 | Status: SHIPPED | OUTPATIENT
Start: 2024-04-30

## 2024-04-30 RX ORDER — MONTELUKAST SODIUM 10 MG/1
10 TABLET ORAL NIGHTLY
Qty: 90 TABLET | Refills: 0 | Status: SHIPPED | OUTPATIENT
Start: 2024-04-30

## 2024-04-30 RX ORDER — BUMETANIDE 2 MG/1
2 TABLET ORAL DAILY
Qty: 45 TABLET | Refills: 0 | OUTPATIENT
Start: 2024-04-30

## 2024-04-30 RX ORDER — QUINIDINE GLUCONATE 324 MG
240 TABLET, EXTENDED RELEASE ORAL
Qty: 90 TABLET | Refills: 0 | Status: SHIPPED | OUTPATIENT
Start: 2024-04-30

## 2024-04-30 RX ORDER — SODIUM BICARBONATE 650 MG/1
650 TABLET ORAL 2 TIMES DAILY
Qty: 180 TABLET | Refills: 0 | Status: SHIPPED | OUTPATIENT
Start: 2024-04-30

## 2024-04-30 RX ORDER — POTASSIUM CHLORIDE 750 MG/1
10 TABLET, FILM COATED, EXTENDED RELEASE ORAL DAILY
Qty: 90 TABLET | Refills: 0 | Status: SHIPPED | OUTPATIENT
Start: 2024-04-30

## 2024-04-30 RX ORDER — BACLOFEN 10 MG/1
10 TABLET ORAL 3 TIMES DAILY
Qty: 270 TABLET | Refills: 0 | Status: SHIPPED | OUTPATIENT
Start: 2024-04-30

## 2024-04-30 RX ORDER — AMIODARONE HYDROCHLORIDE 200 MG/1
TABLET ORAL
Qty: 180 TABLET | Refills: 0 | OUTPATIENT
Start: 2024-04-30

## 2024-04-30 NOTE — TELEPHONE ENCOUNTER
Leny would like a call to discuss  sleep test results  . Leny preferred call back time is  Anytime     Thank you.

## 2024-04-30 NOTE — TELEPHONE ENCOUNTER
Leyn Stone called to request a refill on her medication.      Last office visit : 2/20/2024   Next office visit : 5/8/2024     Requested Prescriptions     Pending Prescriptions Disp Refills    fexofenadine (ALLEGRA) 180 MG tablet [Pharmacy Med Name: FEXOFENADINE 180MG TAB] 90 tablet 1     Sig: Take 1 tablet by mouth daily    bumetanide (BUMEX) 2 MG tablet [Pharmacy Med Name: BUMETANIDE 2MG TAB] 45 tablet 0     Sig: Take 1 tablet by mouth    sodium bicarbonate 650 MG tablet [Pharmacy Med Name: SODIUM BICARBONATE 650MG TAB] 180 tablet 0    potassium chloride (KLOR-CON) 10 MEQ extended release tablet [Pharmacy Med Name: POTASSIUM CL ER 10MEQ TAB] 90 tablet 0    ferrous gluconate (FERGON) 240 (27 Fe) MG tablet [Pharmacy Med Name: FERROUS GLUCONATE 240MG TAB] 90 tablet 0    warfarin (COUMADIN) 5 MG tablet [Pharmacy Med Name: WARFARIN  5MG TAB] 90 tablet 0    montelukast (SINGULAIR) 10 MG tablet [Pharmacy Med Name: MONTELUKAST 10MG TAB] 90 tablet 0    baclofen (LIORESAL) 10 MG tablet [Pharmacy Med Name: BACLOFEN 10MG TAB] 270 tablet 0    levothyroxine (SYNTHROID) 75 MCG tablet [Pharmacy Med Name: LEVOTHYROXINE  75 MCG TAB] 90 tablet 0     Sig: Take one tablet by mouth daily            Sonal Payne LPN

## 2024-05-01 ENCOUNTER — ANTI-COAG VISIT (OUTPATIENT)
Dept: INTERNAL MEDICINE | Age: 74
End: 2024-05-01
Payer: MEDICARE

## 2024-05-01 DIAGNOSIS — Z79.01 ANTICOAGULATED ON COUMADIN: Primary | ICD-10-CM

## 2024-05-01 DIAGNOSIS — K21.9 GASTROESOPHAGEAL REFLUX DISEASE WITHOUT ESOPHAGITIS: ICD-10-CM

## 2024-05-01 DIAGNOSIS — N18.30 STAGE 3 CHRONIC KIDNEY DISEASE, UNSPECIFIED WHETHER STAGE 3A OR 3B CKD (HCC): ICD-10-CM

## 2024-05-01 DIAGNOSIS — R12 CHRONIC HEARTBURN: ICD-10-CM

## 2024-05-01 DIAGNOSIS — K52.9 GASTROENTERITIS: ICD-10-CM

## 2024-05-01 LAB — INR BLD: 2.2

## 2024-05-01 PROCEDURE — 93793 ANTICOAG MGMT PT WARFARIN: CPT | Performed by: INTERNAL MEDICINE

## 2024-05-01 RX ORDER — PANTOPRAZOLE SODIUM 40 MG/1
TABLET, DELAYED RELEASE ORAL
Qty: 180 TABLET | Refills: 1 | Status: SHIPPED | OUTPATIENT
Start: 2024-05-01

## 2024-05-01 RX ORDER — CALCITRIOL 0.25 UG/1
0.25 CAPSULE, LIQUID FILLED ORAL DAILY
Qty: 90 CAPSULE | Refills: 1 | Status: SHIPPED | OUTPATIENT
Start: 2024-05-01

## 2024-05-01 NOTE — PROGRESS NOTES
HOME MONITORING REPORT    INR today:   Results for orders placed or performed in visit on 05/01/24   Protime-INR   Result Value Ref Range    INR 2.20        INR Goal: 2.0-3.0    Dosing Plan  As of 5/1/2024      TTR:  30.7 % (5.9 y)   Full warfarin instructions:  5 mg every day                PLAN: Patient aware to continue current dose and recheck in one week or as ordered.

## 2024-05-05 SDOH — HEALTH STABILITY: PHYSICAL HEALTH: ON AVERAGE, HOW MANY DAYS PER WEEK DO YOU ENGAGE IN MODERATE TO STRENUOUS EXERCISE (LIKE A BRISK WALK)?: 0 DAYS

## 2024-05-05 SDOH — HEALTH STABILITY: PHYSICAL HEALTH: ON AVERAGE, HOW MANY MINUTES DO YOU ENGAGE IN EXERCISE AT THIS LEVEL?: 0 MIN

## 2024-05-05 ASSESSMENT — PATIENT HEALTH QUESTIONNAIRE - PHQ9
7. TROUBLE CONCENTRATING ON THINGS, SUCH AS READING THE NEWSPAPER OR WATCHING TELEVISION: MORE THAN HALF THE DAYS
9. THOUGHTS THAT YOU WOULD BE BETTER OFF DEAD, OR OF HURTING YOURSELF: NOT AT ALL
2. FEELING DOWN, DEPRESSED OR HOPELESS: NEARLY EVERY DAY
5. POOR APPETITE OR OVEREATING: NEARLY EVERY DAY
1. LITTLE INTEREST OR PLEASURE IN DOING THINGS: NEARLY EVERY DAY
8. MOVING OR SPEAKING SO SLOWLY THAT OTHER PEOPLE COULD HAVE NOTICED. OR THE OPPOSITE, BEING SO FIGETY OR RESTLESS THAT YOU HAVE BEEN MOVING AROUND A LOT MORE THAN USUAL: NOT AT ALL
10. IF YOU CHECKED OFF ANY PROBLEMS, HOW DIFFICULT HAVE THESE PROBLEMS MADE IT FOR YOU TO DO YOUR WORK, TAKE CARE OF THINGS AT HOME, OR GET ALONG WITH OTHER PEOPLE: SOMEWHAT DIFFICULT
SUM OF ALL RESPONSES TO PHQ QUESTIONS 1-9: 20
3. TROUBLE FALLING OR STAYING ASLEEP: NEARLY EVERY DAY
SUM OF ALL RESPONSES TO PHQ QUESTIONS 1-9: 20
SUM OF ALL RESPONSES TO PHQ9 QUESTIONS 1 & 2: 6
6. FEELING BAD ABOUT YOURSELF - OR THAT YOU ARE A FAILURE OR HAVE LET YOURSELF OR YOUR FAMILY DOWN: NEARLY EVERY DAY
4. FEELING TIRED OR HAVING LITTLE ENERGY: NEARLY EVERY DAY
SUM OF ALL RESPONSES TO PHQ QUESTIONS 1-9: 20
SUM OF ALL RESPONSES TO PHQ QUESTIONS 1-9: 20

## 2024-05-05 ASSESSMENT — LIFESTYLE VARIABLES
HOW OFTEN DO YOU HAVE SIX OR MORE DRINKS ON ONE OCCASION: 1
HOW MANY STANDARD DRINKS CONTAINING ALCOHOL DO YOU HAVE ON A TYPICAL DAY: 0
HOW OFTEN DO YOU HAVE A DRINK CONTAINING ALCOHOL: 1
HOW OFTEN DO YOU HAVE A DRINK CONTAINING ALCOHOL: NEVER
HOW MANY STANDARD DRINKS CONTAINING ALCOHOL DO YOU HAVE ON A TYPICAL DAY: PATIENT DOES NOT DRINK

## 2024-05-06 RX ORDER — ISOPROPYL ALCOHOL 70 ML/100ML
SWAB TOPICAL
Qty: 100 EACH | Refills: 3 | Status: SHIPPED | OUTPATIENT
Start: 2024-05-06

## 2024-05-08 ENCOUNTER — OFFICE VISIT (OUTPATIENT)
Dept: INTERNAL MEDICINE | Age: 74
End: 2024-05-08

## 2024-05-08 VITALS
BODY MASS INDEX: 37.35 KG/M2 | DIASTOLIC BLOOD PRESSURE: 75 MMHG | OXYGEN SATURATION: 98 % | SYSTOLIC BLOOD PRESSURE: 123 MMHG | HEIGHT: 67 IN | WEIGHT: 238 LBS | HEART RATE: 75 BPM

## 2024-05-08 DIAGNOSIS — I10 PRIMARY HYPERTENSION: Primary | ICD-10-CM

## 2024-05-08 DIAGNOSIS — I48.91 ATRIAL FIBRILLATION, UNSPECIFIED TYPE (HCC): ICD-10-CM

## 2024-05-08 DIAGNOSIS — E53.8 B12 DEFICIENCY: ICD-10-CM

## 2024-05-08 DIAGNOSIS — Z00.00 ROUTINE ADULT HEALTH MAINTENANCE: Primary | ICD-10-CM

## 2024-05-08 DIAGNOSIS — N18.9 CHRONIC KIDNEY DISEASE, UNSPECIFIED CKD STAGE: ICD-10-CM

## 2024-05-08 DIAGNOSIS — G47.30 SLEEP APNEA, UNSPECIFIED TYPE: ICD-10-CM

## 2024-05-08 DIAGNOSIS — E11.49 OTHER DIABETIC NEUROLOGICAL COMPLICATION ASSOCIATED WITH TYPE 2 DIABETES MELLITUS (HCC): ICD-10-CM

## 2024-05-08 DIAGNOSIS — I10 PRIMARY HYPERTENSION: ICD-10-CM

## 2024-05-08 DIAGNOSIS — E78.5 HYPERLIPIDEMIA, UNSPECIFIED HYPERLIPIDEMIA TYPE: ICD-10-CM

## 2024-05-08 DIAGNOSIS — K21.9 GASTROESOPHAGEAL REFLUX DISEASE WITHOUT ESOPHAGITIS: ICD-10-CM

## 2024-05-08 DIAGNOSIS — E11.21 TYPE II DIABETES MELLITUS WITH NEPHROPATHY (HCC): ICD-10-CM

## 2024-05-08 DIAGNOSIS — I49.5 SICK SINUS SYNDROME (HCC): ICD-10-CM

## 2024-05-08 DIAGNOSIS — D50.9 IRON DEFICIENCY ANEMIA, UNSPECIFIED IRON DEFICIENCY ANEMIA TYPE: ICD-10-CM

## 2024-05-08 DIAGNOSIS — F32.89 OTHER DEPRESSION: ICD-10-CM

## 2024-05-08 DIAGNOSIS — E03.9 HYPOTHYROIDISM, UNSPECIFIED TYPE: ICD-10-CM

## 2024-05-08 DIAGNOSIS — Z91.09 ENVIRONMENTAL ALLERGIES: ICD-10-CM

## 2024-05-08 DIAGNOSIS — M54.50 CHRONIC LOW BACK PAIN, UNSPECIFIED BACK PAIN LATERALITY, UNSPECIFIED WHETHER SCIATICA PRESENT: ICD-10-CM

## 2024-05-08 DIAGNOSIS — G89.29 CHRONIC LOW BACK PAIN, UNSPECIFIED BACK PAIN LATERALITY, UNSPECIFIED WHETHER SCIATICA PRESENT: ICD-10-CM

## 2024-05-08 LAB
25(OH)D3 SERPL-MCNC: 37.1 NG/ML
ALBUMIN SERPL-MCNC: 4.2 G/DL (ref 3.5–5.2)
ALP SERPL-CCNC: 70 U/L (ref 35–104)
ALT SERPL-CCNC: 12 U/L (ref 5–33)
ANION GAP SERPL CALCULATED.3IONS-SCNC: 16 MMOL/L (ref 7–19)
AST SERPL-CCNC: 22 U/L (ref 5–32)
BASOPHILS # BLD: 0 K/UL (ref 0–0.2)
BASOPHILS NFR BLD: 0.7 % (ref 0–1)
BILIRUB SERPL-MCNC: 0.3 MG/DL (ref 0.2–1.2)
BUN SERPL-MCNC: 19 MG/DL (ref 8–23)
CALCIUM SERPL-MCNC: 9.4 MG/DL (ref 8.8–10.2)
CHLORIDE SERPL-SCNC: 103 MMOL/L (ref 98–111)
CHOLEST SERPL-MCNC: 236 MG/DL (ref 160–199)
CO2 SERPL-SCNC: 20 MMOL/L (ref 22–29)
CREAT SERPL-MCNC: 1.4 MG/DL (ref 0.5–0.9)
CREAT UR-MCNC: 83.7 MG/DL (ref 28–217)
EOSINOPHIL # BLD: 0.1 K/UL (ref 0–0.6)
EOSINOPHIL NFR BLD: 2 % (ref 0–5)
ERYTHROCYTE [DISTWIDTH] IN BLOOD BY AUTOMATED COUNT: 17.7 % (ref 11.5–14.5)
FERRITIN SERPL-MCNC: 216.5 NG/ML (ref 13–150)
GLUCOSE SERPL-MCNC: 97 MG/DL (ref 74–109)
HBA1C MFR BLD: 5.7 % (ref 4–6)
HCT VFR BLD AUTO: 36.9 % (ref 37–47)
HDLC SERPL-MCNC: 113 MG/DL (ref 65–121)
HGB BLD-MCNC: 11.8 G/DL (ref 12–16)
IMM GRANULOCYTES # BLD: 0 K/UL
IRON SATN MFR SERPL: 23 % (ref 14–50)
IRON SERPL-MCNC: 68 UG/DL (ref 37–145)
LDLC SERPL CALC-MCNC: 107 MG/DL
LYMPHOCYTES # BLD: 1.6 K/UL (ref 1.1–4.5)
LYMPHOCYTES NFR BLD: 36.7 % (ref 20–40)
MCH RBC QN AUTO: 29.6 PG (ref 27–31)
MCHC RBC AUTO-ENTMCNC: 32 G/DL (ref 33–37)
MCV RBC AUTO: 92.5 FL (ref 81–99)
MICROALBUMIN UR-MCNC: <1.2 MG/DL (ref 0–19)
MICROALBUMIN/CREAT UR-RTO: NORMAL MG/G
MONOCYTES # BLD: 0.5 K/UL (ref 0–0.9)
MONOCYTES NFR BLD: 11 % (ref 0–10)
NEUTROPHILS # BLD: 2.2 K/UL (ref 1.5–7.5)
NEUTS SEG NFR BLD: 49.4 % (ref 50–65)
PLATELET # BLD AUTO: 162 K/UL (ref 130–400)
PMV BLD AUTO: 13 FL (ref 9.4–12.3)
POTASSIUM SERPL-SCNC: 4.3 MMOL/L (ref 3.5–5)
PROT SERPL-MCNC: 8.1 G/DL (ref 6.6–8.7)
RBC # BLD AUTO: 3.99 M/UL (ref 4.2–5.4)
SODIUM SERPL-SCNC: 139 MMOL/L (ref 136–145)
TIBC SERPL-MCNC: 297 UG/DL (ref 250–400)
TRIGL SERPL-MCNC: 80 MG/DL (ref 0–149)
VIT B12 SERPL-MCNC: 1015 PG/ML (ref 211–946)
WBC # BLD AUTO: 4.4 K/UL (ref 4.8–10.8)

## 2024-05-08 RX ORDER — ZOSTER VACCINE RECOMBINANT, ADJUVANTED 50 MCG/0.5
0.5 KIT INTRAMUSCULAR SEE ADMIN INSTRUCTIONS
Qty: 0.5 ML | Refills: 0 | Status: SHIPPED | OUTPATIENT
Start: 2024-05-08 | End: 2024-11-04

## 2024-05-08 RX ORDER — QUINIDINE GLUCONATE 324 MG
240 TABLET, EXTENDED RELEASE ORAL
Qty: 180 TABLET | Refills: 0 | Status: SHIPPED | OUTPATIENT
Start: 2024-05-08

## 2024-05-08 RX ORDER — CYANOCOBALAMIN 1000 UG/ML
1000 INJECTION, SOLUTION INTRAMUSCULAR; SUBCUTANEOUS ONCE
Status: COMPLETED | OUTPATIENT
Start: 2024-05-08 | End: 2024-05-08

## 2024-05-08 RX ADMIN — CYANOCOBALAMIN 1000 MCG: 1000 INJECTION, SOLUTION INTRAMUSCULAR; SUBCUTANEOUS at 11:56

## 2024-05-08 NOTE — PROGRESS NOTES
After obtaining consent, and per orders of Dr. Curtis, injection of Vitamin B12 given in Right deltoid by Rula Borjas MA.   
evaluation or treatment    Anxiety:     Anxiety Interventions:  Patient declines any further evaluation or treatment    Cognitive:  Clock Drawing Test (CDT): Normal  Cognitive Impairment Interventions:  Patient declines any further evaluation/treatment for cognitive impairment    Substance Abuse:  Social History     Socioeconomic History    Marital status:      Spouse name: Not on file    Number of children: 1    Years of education: Not on file    Highest education level: Not on file   Occupational History     Employer: [employer]      Comment:    Tobacco Use    Smoking status: Never    Smokeless tobacco: Never   Vaping Use    Vaping Use: Never used   Substance and Sexual Activity    Alcohol use: Never    Drug use: No    Sexual activity: Not Currently     Partners: Male     Comment: has a daughter   Other Topics Concern    Not on file   Social History Narrative    CODE STATUS: Full Code    HEALTH CARE PROXY: her daughter, [phone]    Backup: Shayy, grand daughter, [phone]    AMBULATES: uses a Roll[employer]    DOMICILED: has steps to enter, no stairs inside, lives with her daughter and two grand daughters, her niece, and her         Lives with daughter, son-in-law, granddaughter, niece.    Drives very little, daughter usually transports pt.    Works part time at [employer].    Has pet dog.     Social Determinants of Health     Financial Resource Strain: Low Risk  (5/10/2024)    Overall Financial Resource Strain (CARDIA)     Difficulty of Paying Living Expenses: Not hard at all   Food Insecurity: No Food Insecurity (5/10/2024)    Hunger Vital Sign     Worried About Running Out of Food in the Last Year: Never true     Ran Out of Food in the Last Year: Never true   Transportation Needs: No Transportation Needs (5/10/2024)    PRAPARE - Transportation     Lack of Transportation (Medical): No     Lack of Transportation (Non-Medical): No   Physical Activity: Inactive

## 2024-05-09 ENCOUNTER — ANTI-COAG VISIT (OUTPATIENT)
Dept: INTERNAL MEDICINE | Age: 74
End: 2024-05-09
Payer: MEDICARE

## 2024-05-09 DIAGNOSIS — Z79.01 ANTICOAGULATED ON COUMADIN: Primary | ICD-10-CM

## 2024-05-09 DIAGNOSIS — D50.9 IRON DEFICIENCY ANEMIA, UNSPECIFIED IRON DEFICIENCY ANEMIA TYPE: ICD-10-CM

## 2024-05-09 LAB — INR BLD: 3.8

## 2024-05-09 PROCEDURE — 93793 ANTICOAG MGMT PT WARFARIN: CPT | Performed by: INTERNAL MEDICINE

## 2024-05-09 RX ORDER — QUINIDINE GLUCONATE 324 MG
TABLET, EXTENDED RELEASE ORAL
Qty: 180 TABLET | Refills: 0 | OUTPATIENT
Start: 2024-05-09

## 2024-05-09 RX ORDER — BUMETANIDE 1 MG/1
1 TABLET ORAL DAILY
Qty: 90 TABLET | Refills: 1 | Status: SHIPPED | OUTPATIENT
Start: 2024-05-09

## 2024-05-09 NOTE — PROGRESS NOTES
HOME MONITORING REPORT    INR today:   Results for orders placed or performed in visit on 05/09/24   Protime-INR   Result Value Ref Range    INR 3.80        INR Goal: 2.0-3.0    Dosing Plan  As of 5/9/2024      TTR:  30.8 % (5.9 y)   Full warfarin instructions:  5 mg every day              Verified patients dose,  Patient stated that nothing has changed diet-wise and patient has not taken anything OTC that would have increased it.     Per Dr. Curtis instructed patient to hold dose for tonight, then resume normal dose and recheck in 1 week. Patient voiced her understanding.

## 2024-05-10 SDOH — ECONOMIC STABILITY: FOOD INSECURITY: WITHIN THE PAST 12 MONTHS, YOU WORRIED THAT YOUR FOOD WOULD RUN OUT BEFORE YOU GOT MONEY TO BUY MORE.: NEVER TRUE

## 2024-05-10 SDOH — ECONOMIC STABILITY: FOOD INSECURITY: WITHIN THE PAST 12 MONTHS, THE FOOD YOU BOUGHT JUST DIDN'T LAST AND YOU DIDN'T HAVE MONEY TO GET MORE.: NEVER TRUE

## 2024-05-10 SDOH — ECONOMIC STABILITY: INCOME INSECURITY: HOW HARD IS IT FOR YOU TO PAY FOR THE VERY BASICS LIKE FOOD, HOUSING, MEDICAL CARE, AND HEATING?: NOT HARD AT ALL

## 2024-05-10 ASSESSMENT — ENCOUNTER SYMPTOMS
COLOR CHANGE: 0
NAUSEA: 0
ABDOMINAL DISTENTION: 0
BLOOD IN STOOL: 0
STRIDOR: 0
ABDOMINAL PAIN: 0
SINUS PRESSURE: 0
VOMITING: 0
DIARRHEA: 0
CHEST TIGHTNESS: 0
EYE ITCHING: 0
EYE DISCHARGE: 0
RHINORRHEA: 0
SHORTNESS OF BREATH: 0
COUGH: 0
BACK PAIN: 0
WHEEZING: 0
APNEA: 0
CONSTIPATION: 0
VOICE CHANGE: 0
SINUS PAIN: 0
TROUBLE SWALLOWING: 0

## 2024-05-15 ENCOUNTER — ANTI-COAG VISIT (OUTPATIENT)
Dept: INTERNAL MEDICINE | Age: 74
End: 2024-05-15
Payer: MEDICARE

## 2024-05-15 DIAGNOSIS — Z79.01 ANTICOAGULATED ON COUMADIN: Primary | ICD-10-CM

## 2024-05-15 LAB — INR BLD: 2.6

## 2024-05-15 PROCEDURE — 93793 ANTICOAG MGMT PT WARFARIN: CPT | Performed by: INTERNAL MEDICINE

## 2024-05-15 NOTE — PROGRESS NOTES
HOME MONITORING REPORT    INR today:   Results for orders placed or performed in visit on 05/15/24   Protime-INR   Result Value Ref Range    INR 2.60        INR Goal: 2.0-3.0    Dosing Plan  As of 5/15/2024      TTR:  30.8 % (5.9 y)   Full warfarin instructions:  5 mg every day                PLAN: Patient aware to continue current dose and recheck in one week or as ordered.     Electronically signed by Regina Curtis MD on 5/16/2024 at 4:59 AM

## 2024-05-23 ENCOUNTER — ANTI-COAG VISIT (OUTPATIENT)
Dept: INTERNAL MEDICINE | Age: 74
End: 2024-05-23
Payer: MEDICARE

## 2024-05-23 DIAGNOSIS — Z79.01 ANTICOAGULATED ON COUMADIN: Primary | ICD-10-CM

## 2024-05-23 LAB — INR BLD: 3.2

## 2024-05-23 PROCEDURE — 93793 ANTICOAG MGMT PT WARFARIN: CPT | Performed by: INTERNAL MEDICINE

## 2024-05-23 NOTE — PROGRESS NOTES
I called the patient and advised her to hold the coumadin tonight then resume her current dose. Re-test in one week. The patient voiced understanding.

## 2024-05-23 NOTE — PROGRESS NOTES
HOME MONITORING REPORT    INR today:   Results for orders placed or performed in visit on 05/23/24   Protime-INR   Result Value Ref Range    INR 3.20        INR Goal: 2.0-3.0    Dosing Plan  As of 5/23/2024      TTR:  30.9 % (6 y)   Full warfarin instructions:  5 mg every day              I confirmed the patient's coumadin dose as 5 Mg every day .The patient denies any medication or diet changes. Please dose the patient.

## 2024-05-24 RX ORDER — AMIODARONE HYDROCHLORIDE 100 MG/1
100 TABLET ORAL DAILY
Qty: 90 TABLET | Refills: 3 | Status: SHIPPED | OUTPATIENT
Start: 2024-05-24

## 2024-05-29 ENCOUNTER — ANTI-COAG VISIT (OUTPATIENT)
Dept: PEDIATRICS | Age: 74
End: 2024-05-29
Payer: MEDICARE

## 2024-05-29 DIAGNOSIS — Z79.01 ANTICOAGULATED ON COUMADIN: Primary | ICD-10-CM

## 2024-05-29 LAB — INR BLD: 1.7

## 2024-05-29 PROCEDURE — 93793 ANTICOAG MGMT PT WARFARIN: CPT | Performed by: INTERNAL MEDICINE

## 2024-05-29 NOTE — PROGRESS NOTES
HOME MONITORING REPORT    INR today:   Results for orders placed or performed in visit on 05/29/24   Protime-INR   Result Value Ref Range    INR 1.70        INR Goal: 2.0-3.0    Dosing Plan  As of 5/29/2024      TTR:  31.0 % (6 y)   Full warfarin instructions:  5 mg every day                PLAN: Confirmed dose of 5mg daily, Patient was told to hold dose on last Thursday but also missed Friday's dose. Patient will continue normal dose and recheck in 1 week.

## 2024-05-30 ENCOUNTER — PATIENT MESSAGE (OUTPATIENT)
Dept: INTERNAL MEDICINE | Age: 74
End: 2024-05-30

## 2024-05-30 DIAGNOSIS — Z91.09 ENVIRONMENTAL ALLERGIES: ICD-10-CM

## 2024-05-30 DIAGNOSIS — E87.6 HYPOKALEMIA: ICD-10-CM

## 2024-05-30 DIAGNOSIS — D50.9 IRON DEFICIENCY ANEMIA, UNSPECIFIED IRON DEFICIENCY ANEMIA TYPE: ICD-10-CM

## 2024-05-30 DIAGNOSIS — G62.9 NEUROPATHY: ICD-10-CM

## 2024-05-30 RX ORDER — QUINIDINE GLUCONATE 324 MG
240 TABLET, EXTENDED RELEASE ORAL
Qty: 180 TABLET | Refills: 1 | Status: SHIPPED | OUTPATIENT
Start: 2024-05-30

## 2024-05-30 RX ORDER — FEXOFENADINE HCL 180 MG/1
180 TABLET ORAL DAILY
Qty: 90 TABLET | Refills: 1 | Status: SHIPPED | OUTPATIENT
Start: 2024-05-30

## 2024-05-30 NOTE — TELEPHONE ENCOUNTER
From: Leny Stone  To: Dr. Regina Curtis  Sent: 5/30/2024 1:13 PM CDT  Subject: medicine prescription to [facility]    I need prescriptions sent to [facility]. Ferrous gluconate 240 (27 Fe) mg (fergon) and Fexofenadine 180 mg (Allegra). Thank You

## 2024-05-31 NOTE — TELEPHONE ENCOUNTER
Leny Stone called to request a refill on her medication.      Last office visit : 5/8/2024   Next office visit : 8/9/2024     Last UDS:   Benzodiazepine Screen, Urine   Date Value Ref Range Status   07/07/2021 NEG  Final     Buprenorphine Urine   Date Value Ref Range Status   07/07/2021 NEG  Final     Cocaine Metabolite Screen, Urine   Date Value Ref Range Status   07/07/2021 NEG  Final     Gabapentin Screen, Urine   Date Value Ref Range Status   07/07/2021 NEG  Final     MDMA, Urine   Date Value Ref Range Status   07/07/2021 NEG  Final     Oxycodone Screen, Ur   Date Value Ref Range Status   07/07/2021 NEG  Final     Propoxyphene Screen, Urine   Date Value Ref Range Status   07/07/2021 NEG  Final     THC Screen, Urine   Date Value Ref Range Status   07/07/2021 NEG  Final     Tricyclic Antidepressants, Urine   Date Value Ref Range Status   07/07/2021 NEG  Final       Last Jose: 04/25/2024  Medication Contract: 10/11/2021     Requested Prescriptions     Pending Prescriptions Disp Refills    gabapentin (NEURONTIN) 300 MG capsule [Pharmacy Med Name: GABAPENTIN 300 MG Capsule] 270 capsule      Sig: TAKE 1 CAPSULE THREE TIMES DAILY    potassium chloride (KLOR-CON) 10 MEQ extended release tablet [Pharmacy Med Name: POTASSIUM CHLORIDE ER 10 MEQ Tablet Extended Release] 90 tablet 3     Sig: TAKE 1 TABLET EVERY DAY         Please approve or refuse this medication.   Sonal Payne LPN

## 2024-06-03 RX ORDER — POTASSIUM CHLORIDE 750 MG/1
10 TABLET, FILM COATED, EXTENDED RELEASE ORAL DAILY
Qty: 90 TABLET | Refills: 3 | Status: SHIPPED | OUTPATIENT
Start: 2024-06-03

## 2024-06-03 RX ORDER — GABAPENTIN 300 MG/1
CAPSULE ORAL
Qty: 270 CAPSULE | Refills: 0 | Status: SHIPPED | OUTPATIENT
Start: 2024-06-03 | End: 2024-09-01

## 2024-06-05 ENCOUNTER — ANTI-COAG VISIT (OUTPATIENT)
Dept: INTERNAL MEDICINE | Age: 74
End: 2024-06-05

## 2024-06-05 DIAGNOSIS — Z79.01 ANTICOAGULATED ON COUMADIN: Primary | ICD-10-CM

## 2024-06-05 LAB — INR BLD: 1.4

## 2024-06-05 NOTE — PROGRESS NOTES
HOME MONITORING REPORT    INR today:   Results for orders placed or performed in visit on 06/05/24   Protime-INR   Result Value Ref Range    INR 1.40        INR Goal: 2.0-3.0    Dosing Plan  As of 6/5/2024      TTR:  30.9 % (6 y)   Full warfarin instructions:  5 mg every day             I confirmed the coumadin dose as 5 Mg every day. The patient denies any diet or medication changes. She reports that she doesn't meat much. Please dose the patient.

## 2024-06-05 NOTE — PROGRESS NOTES
Per Odalis Canela:    Have her take an extra pill for the next 3 nights and then resume her usual dose recheck on her usual time           I called the patient and advised her to take 10 Mg for the next three nights then resume her usual dose of coumadin. Re-test on 6/12/24.

## 2024-06-06 ENCOUNTER — TELEPHONE (OUTPATIENT)
Dept: CARDIOLOGY CLINIC | Age: 74
End: 2024-06-06

## 2024-06-06 NOTE — TELEPHONE ENCOUNTER
Pt called and needs reschedule on FU, nothing available until October, alsothe meds that were prescribed, she can not afford, so she is not taking that med, I explaine that we might find something different  or less expensive or other, please call patient, thanks !

## 2024-06-06 NOTE — TELEPHONE ENCOUNTER
Called to reschedule patient. Gave next available of 8/8/2024. Patient stated she can't do Thursdays to which I informed her Dr. Ayon is only in office on Monday afternoon and all day Thursday. Patient stated if he's only available for an a.m. appt on Thursday she will attempt to keep 6/13/2024 appt and will call back if unable to.

## 2024-06-12 ENCOUNTER — ANTI-COAG VISIT (OUTPATIENT)
Dept: PRIMARY CARE CLINIC | Age: 74
End: 2024-06-12
Payer: MEDICARE

## 2024-06-12 DIAGNOSIS — Z79.01 ANTICOAGULATED ON COUMADIN: Primary | ICD-10-CM

## 2024-06-12 LAB — INR BLD: 2.2

## 2024-06-12 PROCEDURE — 93793 ANTICOAG MGMT PT WARFARIN: CPT | Performed by: INTERNAL MEDICINE

## 2024-06-12 NOTE — PROGRESS NOTES
HOME MONITORING REPORT    INR today:   Results for orders placed or performed in visit on 06/12/24   Protime-INR   Result Value Ref Range    INR 2.20        INR Goal: 2.0-3.0    Dosing Plan  As of 6/12/2024      TTR:  30.9 % (6 y)   Full warfarin instructions:  6/15: 10 mg; Otherwise 5 mg every day                PLAN: Advised patient/caregiver to increase her dose on Saturday's only to 10 mg, 5 mg all other days and recheck in one week.  Patient/Caregiver voiced understanding  HOME MONITORING REPORT    INR today:   Results for orders placed or performed in visit on 06/12/24   Protime-INR   Result Value Ref Range    INR 2.20        INR Goal: 2.0-3.0    Dosing Plan  As of 6/12/2024      TTR:  30.9 % (6 y)   Full warfarin instructions:  5 mg every day                PLAN: Advised patient/caregiver to continue current dose and recheck in one week.  Patient/Caregiver voiced understanding

## 2024-06-13 ENCOUNTER — OFFICE VISIT (OUTPATIENT)
Dept: CARDIOLOGY CLINIC | Age: 74
End: 2024-06-13
Payer: MEDICARE

## 2024-06-13 VITALS
WEIGHT: 244 LBS | HEIGHT: 67 IN | HEART RATE: 63 BPM | SYSTOLIC BLOOD PRESSURE: 180 MMHG | BODY MASS INDEX: 38.3 KG/M2 | DIASTOLIC BLOOD PRESSURE: 110 MMHG

## 2024-06-13 DIAGNOSIS — Z95.0 PACEMAKER: ICD-10-CM

## 2024-06-13 DIAGNOSIS — I48.0 PAROXYSMAL ATRIAL FIBRILLATION (HCC): Primary | ICD-10-CM

## 2024-06-13 DIAGNOSIS — Z79.01 CHRONIC ANTICOAGULATION: ICD-10-CM

## 2024-06-13 DIAGNOSIS — I10 PRIMARY HYPERTENSION: ICD-10-CM

## 2024-06-13 DIAGNOSIS — Z79.899 LONG TERM CURRENT USE OF ANTIARRHYTHMIC DRUG: ICD-10-CM

## 2024-06-13 PROCEDURE — 1123F ACP DISCUSS/DSCN MKR DOCD: CPT | Performed by: INTERNAL MEDICINE

## 2024-06-13 PROCEDURE — 1090F PRES/ABSN URINE INCON ASSESS: CPT | Performed by: INTERNAL MEDICINE

## 2024-06-13 PROCEDURE — 1036F TOBACCO NON-USER: CPT | Performed by: INTERNAL MEDICINE

## 2024-06-13 PROCEDURE — G8427 DOCREV CUR MEDS BY ELIG CLIN: HCPCS | Performed by: INTERNAL MEDICINE

## 2024-06-13 PROCEDURE — 3080F DIAST BP >= 90 MM HG: CPT | Performed by: INTERNAL MEDICINE

## 2024-06-13 PROCEDURE — 3077F SYST BP >= 140 MM HG: CPT | Performed by: INTERNAL MEDICINE

## 2024-06-13 PROCEDURE — 93000 ELECTROCARDIOGRAM COMPLETE: CPT | Performed by: INTERNAL MEDICINE

## 2024-06-13 PROCEDURE — 99214 OFFICE O/P EST MOD 30 MIN: CPT | Performed by: INTERNAL MEDICINE

## 2024-06-13 PROCEDURE — G8417 CALC BMI ABV UP PARAM F/U: HCPCS | Performed by: INTERNAL MEDICINE

## 2024-06-13 PROCEDURE — G8399 PT W/DXA RESULTS DOCUMENT: HCPCS | Performed by: INTERNAL MEDICINE

## 2024-06-13 PROCEDURE — 3017F COLORECTAL CA SCREEN DOC REV: CPT | Performed by: INTERNAL MEDICINE

## 2024-06-13 RX ORDER — AMLODIPINE BESYLATE 5 MG/1
5 TABLET ORAL DAILY
Qty: 90 TABLET | Refills: 1 | Status: SHIPPED | OUTPATIENT
Start: 2024-06-13

## 2024-06-13 RX ORDER — AMIODARONE HYDROCHLORIDE 200 MG/1
100 TABLET ORAL DAILY
Qty: 30 TABLET | Refills: 5 | Status: SHIPPED | OUTPATIENT
Start: 2024-06-13

## 2024-06-13 RX ORDER — AMIODARONE HYDROCHLORIDE 100 MG/1
100 TABLET ORAL DAILY
Qty: 90 TABLET | Refills: 3 | Status: SHIPPED | OUTPATIENT
Start: 2024-06-13 | End: 2024-06-13 | Stop reason: DRUGHIGH

## 2024-06-13 ASSESSMENT — ENCOUNTER SYMPTOMS
SHORTNESS OF BREATH: 0
EYES NEGATIVE: 1
GASTROINTESTINAL NEGATIVE: 1
NAUSEA: 0
RESPIRATORY NEGATIVE: 1
VOMITING: 0
DIARRHEA: 0

## 2024-06-13 NOTE — PROGRESS NOTES
[facility]                            Date:  6/13/2024  Patient: Leny Stone  Age:  73 y.o., 1950      Reason for evaluation:         SUBJECTIVE:    Returns today follow-up with assessment paroxysmal atrial fibrillation chronic anticoagulation antiarrhythmic drug usage previous pacemaker hypertension overall doing well from a symptom standpoint.  Quit taking her amiodarone 2 months ago due to cost.  Denies bleeding issues denies chest pain.  Dyspnea stable.  Blood pressure consistently high.  Will add amlodipine 5 mg a day.  We also found out that the 200 mg amiodarone tablets to half a tablet a day were only $4 and will prescribe that and lieu of what she is on now she has not had any recent awareness of atrial fibrillation episodes.  No other complaints or issues reported.  EKG tracing sinus rhythm QTc 395 left atrial enlargement voltage criteria for LVH nonspecific ST and T wave abnormalities.    Review of Systems   Constitutional: Negative.  Negative for chills, fever and unexpected weight change.   HENT: Negative.     Eyes: Negative.    Respiratory: Negative.  Negative for shortness of breath.    Cardiovascular: Negative.  Negative for chest pain.   Gastrointestinal: Negative.  Negative for diarrhea, nausea and vomiting.   Endocrine: Negative.    Genitourinary: Negative.    Musculoskeletal: Negative.    Skin: Negative.    Neurological: Negative.    All other systems reviewed and are negative.        OBJECTIVE:    BP (!) 180/110   Pulse 63   Ht 1.702 m (5' 7\")   Wt 110.7 kg (244 lb)   BMI 38.22 kg/m²     Labs:   CBC: No results for input(s): \"WBC\", \"HGB\", \"HCT\", \"PLT\" in the last 72 hours.  BMP:No results for input(s): \"NA\", \"K\", \"CO2\", \"BUN\", \"CREATININE\", \"LABGLOM\", \"GLUCOSE\" in the last 72 hours.  BNP: No results for input(s): \"BNP\" in the last 72 hours.  PT/INR:   Recent Labs     06/12/24  0000   INR 2.20     APTT:No results for input(s): \"APTT\" in the last 72

## 2024-06-13 NOTE — PATIENT INSTRUCTIONS
Remember when you  your Amiodarone, you will need to cut this in half (there is a score/cut line on the pill that makes it easy to break in half) and only take the one-half daily. The mg that is on the $4 list at [facility] is 200 mg and you are taking 100 mg which requires for you to break the pill in half to ensure you get the correct 100 mg dose.

## 2024-06-24 ENCOUNTER — ANTI-COAG VISIT (OUTPATIENT)
Dept: PRIMARY CARE CLINIC | Age: 74
End: 2024-06-24
Payer: MEDICARE

## 2024-06-24 DIAGNOSIS — Z79.01 ANTICOAGULATED ON COUMADIN: Primary | ICD-10-CM

## 2024-06-24 LAB — INR BLD: 2.1

## 2024-06-24 PROCEDURE — 93793 ANTICOAG MGMT PT WARFARIN: CPT | Performed by: INTERNAL MEDICINE

## 2024-06-24 NOTE — PROGRESS NOTES
HOME MONITORING REPORT    INR today:   Results for orders placed or performed in visit on 06/24/24   Protime-INR   Result Value Ref Range    INR 2.10        INR Goal: 2.0-3.0    Dosing Plan  As of 6/24/2024      TTR:  31.3 % (6.1 y)   Full warfarin instructions:  5 mg every day                PLAN: Advised patient/caregiver to continue current dose and recheck in one week.  Patient/Caregiver voiced understanding

## 2024-06-27 ENCOUNTER — ANTI-COAG VISIT (OUTPATIENT)
Dept: PRIMARY CARE CLINIC | Age: 74
End: 2024-06-27

## 2024-06-27 DIAGNOSIS — Z79.01 ANTICOAGULATED ON COUMADIN: Primary | ICD-10-CM

## 2024-06-27 LAB — INR BLD: 1.4

## 2024-06-27 NOTE — PROGRESS NOTES
HOME MONITORING REPORT    INR today:   Results for orders placed or performed in visit on 06/27/24   Protime-INR   Result Value Ref Range    INR 1.40        INR Goal: 2.0-3.0    Dosing Plan  As of 6/27/2024      TTR:  31.3 % (6.1 y)   Full warfarin instructions:  6/27: 10 mg; Otherwise 5 mg every day                PLAN: Advised patient/caregiver via voicemail to increase her dose tonight to 10 mg, try not to miss any doses. continue current dose and recheck next Monday. Message left to call back if she has any questions.

## 2024-07-01 LAB
INR BLD: 2.4
INR BLD: 2.4

## 2024-07-09 ENCOUNTER — ANTI-COAG VISIT (OUTPATIENT)
Dept: PRIMARY CARE CLINIC | Age: 74
End: 2024-07-09
Payer: MEDICARE

## 2024-07-09 DIAGNOSIS — Z79.01 ANTICOAGULATED ON COUMADIN: Primary | ICD-10-CM

## 2024-07-09 PROCEDURE — 93793 ANTICOAG MGMT PT WARFARIN: CPT | Performed by: INTERNAL MEDICINE

## 2024-07-09 NOTE — PROGRESS NOTES
HOME MONITORING REPORT    INR today:   Results for orders placed or performed in visit on 07/09/24   Protime-INR   Result Value Ref Range    INR 2.40    Protime-INR   Result Value Ref Range    INR 2.40        INR Goal: 2.0-3.0    Dosing Plan  As of 7/9/2024      TTR:  31.3 % (6.1 y)   Full warfarin instructions:  5 mg every day                PLAN: Patient aware to continue current dose and recheck in one week or as ordered.

## 2024-07-11 ENCOUNTER — ANTI-COAG VISIT (OUTPATIENT)
Dept: PRIMARY CARE CLINIC | Age: 74
End: 2024-07-11

## 2024-07-11 DIAGNOSIS — Z79.01 ANTICOAGULATED ON COUMADIN: Primary | ICD-10-CM

## 2024-07-11 LAB — INR BLD: 4.7

## 2024-07-11 NOTE — PROGRESS NOTES
HOME MONITORING REPORT    INR today:   Results for orders placed or performed in visit on 07/11/24   Protime-INR   Result Value Ref Range    INR 4.70        INR Goal: 2.0-3.0    Dosing Plan  As of 7/11/2024      TTR:  31.3 % (6.1 y)   Full warfarin instructions:  7/11: Hold; 7/12: Hold; Otherwise 5 mg every day                PLAN: Advised patient/caregiver to hold her dose tonight and tomorrow, then continue current dose and recheck in one week.  Patient/Caregiver voiced understanding

## 2024-07-13 DIAGNOSIS — I82.4Y9 DVT, LOWER EXTREMITY, PROXIMAL, ACUTE, UNSPECIFIED LATERALITY (HCC): ICD-10-CM

## 2024-07-13 DIAGNOSIS — J98.4 RESTRICTIVE AIRWAY DISEASE: ICD-10-CM

## 2024-07-13 DIAGNOSIS — M17.10 ARTHRITIS OF KNEE: ICD-10-CM

## 2024-07-13 DIAGNOSIS — I48.0 PAROXYSMAL ATRIAL FIBRILLATION (HCC): Primary | ICD-10-CM

## 2024-07-15 RX ORDER — MONTELUKAST SODIUM 10 MG/1
10 TABLET ORAL NIGHTLY
Qty: 90 TABLET | Refills: 1 | Status: SHIPPED | OUTPATIENT
Start: 2024-07-15

## 2024-07-15 RX ORDER — WARFARIN SODIUM 5 MG/1
TABLET ORAL
Qty: 90 TABLET | Refills: 1 | Status: SHIPPED | OUTPATIENT
Start: 2024-07-15

## 2024-07-15 RX ORDER — BACLOFEN 10 MG/1
10 TABLET ORAL 3 TIMES DAILY
Qty: 270 TABLET | Refills: 1 | Status: SHIPPED | OUTPATIENT
Start: 2024-07-15

## 2024-07-18 ENCOUNTER — ANTI-COAG VISIT (OUTPATIENT)
Dept: PRIMARY CARE CLINIC | Age: 74
End: 2024-07-18
Payer: MEDICARE

## 2024-07-18 DIAGNOSIS — Z79.01 ANTICOAGULATED ON COUMADIN: Primary | ICD-10-CM

## 2024-07-18 DIAGNOSIS — K12.0 CANKER SORE: ICD-10-CM

## 2024-07-18 DIAGNOSIS — R09.81 HEAD CONGESTION: ICD-10-CM

## 2024-07-18 LAB — INR BLD: 1.5

## 2024-07-18 PROCEDURE — 93793 ANTICOAG MGMT PT WARFARIN: CPT | Performed by: INTERNAL MEDICINE

## 2024-07-18 RX ORDER — CEFDINIR 300 MG/1
300 CAPSULE ORAL 2 TIMES DAILY
Qty: 20 CAPSULE | Refills: 0 | Status: SHIPPED | OUTPATIENT
Start: 2024-07-18 | End: 2024-07-28

## 2024-07-18 NOTE — PROGRESS NOTES
She stated that she has URI symptoms and that you told her to go to Urgent Care. I assume she did not speak with you but staff member. Regina MCDUFFIE

## 2024-07-18 NOTE — PROGRESS NOTES
HOME MONITORING REPORT    INR today:   Results for orders placed or performed in visit on 07/18/24   Protime-INR   Result Value Ref Range    INR 1.50        INR Goal: 2.0-3.0    Dosing Plan  As of 7/18/2024      TTR:  31.3 % (6.1 y)   Full warfarin instructions:  7/18: 7.5 mg; 7/20: 7.5 mg; Otherwise 5 mg every day              She missed \"a couple\" additional doses last week. She states she is sick but will not go to Urgent Care as advised by her PCP.  PLAN: Advised patient/caregiver to increase her dose tonight and Saturday night to 7.5 mg, 5 mg all other days. Recheck in one week.  Patient/Caregiver voiced understanding

## 2024-07-22 RX ORDER — ISOPROPYL ALCOHOL 70 ML/100ML
SWAB TOPICAL
Qty: 100 EACH | Refills: 3 | Status: SHIPPED | OUTPATIENT
Start: 2024-07-22

## 2024-07-23 ENCOUNTER — TELEPHONE (OUTPATIENT)
Dept: NEUROLOGY | Age: 74
End: 2024-07-23

## 2024-07-23 NOTE — TELEPHONE ENCOUNTER
Leny called to schedule with Albina samaniego regarding inspire/ cpap machine. [facility] couldn't accommodate. Please advise.

## 2024-07-25 ENCOUNTER — ANTI-COAG VISIT (OUTPATIENT)
Dept: PRIMARY CARE CLINIC | Age: 74
End: 2024-07-25

## 2024-07-25 DIAGNOSIS — Z79.01 ANTICOAGULATED ON COUMADIN: Primary | ICD-10-CM

## 2024-07-25 LAB — INR BLD: 0.8

## 2024-07-25 NOTE — PROGRESS NOTES
HOME MONITORING REPORT    INR today:   Results for orders placed or performed in visit on 07/25/24   Protime-INR   Result Value Ref Range    INR 0.80        INR Goal: 2.0-3.0    Dosing Plan  As of 7/25/2024      TTR:  31.2 % (6.1 y)   Full warfarin instructions:  5 mg every day              Spoke with Leny. She stated she is on Omnicef but the interaction with warfarin would actually make her blood thinner. I believe this may have been an error code and not a result. She is currently at work and does not have her meter. She agrees to check again tomorrow and call the result in. I am not making any dose adjustments at this time. Result will be brought to Dr. Curtis for dosing if it is out of range.  Patient/Caregiver voiced understanding

## 2024-07-26 ENCOUNTER — ANTI-COAG VISIT (OUTPATIENT)
Dept: INTERNAL MEDICINE | Age: 74
End: 2024-07-26
Payer: MEDICARE

## 2024-07-26 DIAGNOSIS — Z79.01 ANTICOAGULATED ON COUMADIN: Primary | ICD-10-CM

## 2024-07-26 LAB — INR BLD: 1.5

## 2024-07-26 PROCEDURE — 93793 ANTICOAG MGMT PT WARFARIN: CPT | Performed by: INTERNAL MEDICINE

## 2024-07-26 NOTE — PROGRESS NOTES
The patient repots she has been taking her coumadin but she missed one dose this week. I advised her to continue her 5 Mg of coumadin every day then repeat the INR test on 7/29/24. The patient voiced understanding.

## 2024-07-26 NOTE — PROGRESS NOTES
HOME MONITORING REPORT    INR today:   Results for orders placed or performed in visit on 07/26/24   Protime-INR   Result Value Ref Range    INR 1.50        INR Goal: 2.0-3.0    Dosing Plan  As of 7/26/2024      TTR:  31.2 % (6.1 y)   Full warfarin instructions:  5 mg every day                I confirmed the coumadin dose as 5 Mg every day. The patient is on Onicef at this time. Please dose the patient and advise when to re-test.

## 2024-07-27 DIAGNOSIS — E03.9 HYPOTHYROIDISM, UNSPECIFIED TYPE: ICD-10-CM

## 2024-07-29 RX ORDER — LEVOTHYROXINE SODIUM 0.07 MG/1
75 TABLET ORAL DAILY
Qty: 90 TABLET | Refills: 3 | Status: SHIPPED | OUTPATIENT
Start: 2024-07-29

## 2024-07-29 NOTE — TELEPHONE ENCOUNTER
Called patient to let her know Albina is waiting on her sleep study to be resulted. Patient seemed to be having a hard time speaking and sounded out of breath. I asked if patient was ok, she had a hard time answering and stated she thinks she's getting sick, she is nauseas. I asked if patient was having any pain, she said she was in her back. I asked patient to check her BP, she stated she did not have access at the moment to a machine, but she took her BP this morning and it was 191/97. I told her that was high, and mentioned to her I see she has a pacemaker. I offered to call an ambulance, patient declined and said she'd call her daughter to come get her. I advised patient she needs to call her ASAP to take her to the ED as soon as we hang up, patient agreed and understood.     I called the daughter, Vero, made her aware of all the patients symptoms and stated I was worried she might be having a heart attack. Vero stated its probably because she is dehydrated and didn't take her medication this morning. I made it clear patient still should report to the ER to get checked out. Daughter stated she will check on patient.

## 2024-07-31 ENCOUNTER — TELEPHONE (OUTPATIENT)
Dept: CARDIOLOGY CLINIC | Age: 74
End: 2024-07-31

## 2024-07-31 ENCOUNTER — TELEPHONE (OUTPATIENT)
Dept: PRIMARY CARE CLINIC | Age: 74
End: 2024-07-31

## 2024-07-31 NOTE — TELEPHONE ENCOUNTER
Patient was supposed to recheck her INR on 7/29/24 due to previous critical value. I called her and she stated she forgot but agrees to check it later today. Regina MCDUFFIE

## 2024-08-01 ENCOUNTER — ANTI-COAG VISIT (OUTPATIENT)
Dept: PRIMARY CARE CLINIC | Age: 74
End: 2024-08-01

## 2024-08-01 DIAGNOSIS — Z79.01 ANTICOAGULATED ON COUMADIN: Primary | ICD-10-CM

## 2024-08-01 LAB — INR BLD: 1.9

## 2024-08-01 NOTE — PROGRESS NOTES
HOME MONITORING REPORT    INR today:   Results for orders placed or performed in visit on 08/01/24   Protime-INR   Result Value Ref Range    INR 1.90        INR Goal: 2.0-3.0    Dosing Plan  As of 8/1/2024      TTR:  31.1 % (6.2 y)   Full warfarin instructions:  8/1: 7.5 mg; Otherwise 5 mg every day                PLAN: Advised patient/caregiver via voicemail to increase her dose tonight only to 7.5 mg, then continue current dose and recheck in one week. Reminded her to take her medication nightly. Asked that she call back if she has any questions.

## 2024-08-07 DIAGNOSIS — E11.21 TYPE II DIABETES MELLITUS WITH NEPHROPATHY (HCC): ICD-10-CM

## 2024-08-07 DIAGNOSIS — E53.8 B12 DEFICIENCY: ICD-10-CM

## 2024-08-07 LAB
25(OH)D3 SERPL-MCNC: 34.3 NG/ML
ALBUMIN SERPL-MCNC: 4.1 G/DL (ref 3.5–5.2)
ALP SERPL-CCNC: 61 U/L (ref 35–104)
ALT SERPL-CCNC: 10 U/L (ref 5–33)
ANION GAP SERPL CALCULATED.3IONS-SCNC: 11 MMOL/L (ref 7–19)
AST SERPL-CCNC: 19 U/L (ref 5–32)
BASOPHILS # BLD: 0 K/UL (ref 0–0.2)
BASOPHILS NFR BLD: 0.5 % (ref 0–1)
BILIRUB SERPL-MCNC: 0.6 MG/DL (ref 0.2–1.2)
BUN SERPL-MCNC: 20 MG/DL (ref 8–23)
CALCIUM SERPL-MCNC: 9.1 MG/DL (ref 8.8–10.2)
CHLORIDE SERPL-SCNC: 106 MMOL/L (ref 98–111)
CHOLEST SERPL-MCNC: 223 MG/DL (ref 160–199)
CO2 SERPL-SCNC: 24 MMOL/L (ref 22–29)
CREAT SERPL-MCNC: 1.3 MG/DL (ref 0.5–0.9)
CREAT UR-MCNC: 65.7 MG/DL (ref 28–217)
EOSINOPHIL # BLD: 0 K/UL (ref 0–0.6)
EOSINOPHIL NFR BLD: 0.9 % (ref 0–5)
ERYTHROCYTE [DISTWIDTH] IN BLOOD BY AUTOMATED COUNT: 15.9 % (ref 11.5–14.5)
FERRITIN SERPL-MCNC: 204.3 NG/ML (ref 13–150)
GLUCOSE SERPL-MCNC: 91 MG/DL (ref 74–109)
HBA1C MFR BLD: 5.6 % (ref 4–6)
HCT VFR BLD AUTO: 37.8 % (ref 37–47)
HDLC SERPL-MCNC: 104 MG/DL (ref 65–121)
HGB BLD-MCNC: 11.9 G/DL (ref 12–16)
IMM GRANULOCYTES # BLD: 0 K/UL
IRON SATN MFR SERPL: 37 % (ref 14–50)
IRON SERPL-MCNC: 109 UG/DL (ref 37–145)
LDLC SERPL CALC-MCNC: 104 MG/DL
LYMPHOCYTES # BLD: 1.7 K/UL (ref 1.1–4.5)
LYMPHOCYTES NFR BLD: 38.5 % (ref 20–40)
MCH RBC QN AUTO: 30.1 PG (ref 27–31)
MCHC RBC AUTO-ENTMCNC: 31.5 G/DL (ref 33–37)
MCV RBC AUTO: 95.7 FL (ref 81–99)
MICROALBUMIN UR-MCNC: <1.2 MG/DL (ref 0–19)
MICROALBUMIN/CREAT UR-RTO: NORMAL MG/G
MONOCYTES # BLD: 0.5 K/UL (ref 0–0.9)
MONOCYTES NFR BLD: 11.1 % (ref 0–10)
NEUTROPHILS # BLD: 2.2 K/UL (ref 1.5–7.5)
NEUTS SEG NFR BLD: 48.8 % (ref 50–65)
PLATELET # BLD AUTO: 178 K/UL (ref 130–400)
PMV BLD AUTO: 12.2 FL (ref 9.4–12.3)
POTASSIUM SERPL-SCNC: 4.5 MMOL/L (ref 3.5–5)
PROT SERPL-MCNC: 7.6 G/DL (ref 6.6–8.7)
RBC # BLD AUTO: 3.95 M/UL (ref 4.2–5.4)
SODIUM SERPL-SCNC: 141 MMOL/L (ref 136–145)
TIBC SERPL-MCNC: 298 UG/DL (ref 250–400)
TRIGL SERPL-MCNC: 75 MG/DL (ref 0–149)
VIT B12 SERPL-MCNC: 749 PG/ML (ref 211–946)
WBC # BLD AUTO: 4.4 K/UL (ref 4.8–10.8)

## 2024-08-08 ENCOUNTER — ANTI-COAG VISIT (OUTPATIENT)
Dept: PRIMARY CARE CLINIC | Age: 74
End: 2024-08-08

## 2024-08-08 DIAGNOSIS — Z79.01 ANTICOAGULATED ON COUMADIN: Primary | ICD-10-CM

## 2024-08-08 LAB — INR BLD: 2.3

## 2024-08-08 NOTE — PROGRESS NOTES
HOME MONITORING REPORT    INR today:   Results for orders placed or performed in visit on 08/08/24   Protime-INR   Result Value Ref Range    INR 2.30        INR Goal: 2.0-3.0    Dosing Plan  As of 8/8/2024      TTR:  31.2 % (6.2 y)   Full warfarin instructions:  5 mg every day                PLAN: Advised patient/caregiver to continue current dose of 5 mg a day and recheck in one week.  Patient/Caregiver voiced understanding

## 2024-08-09 ENCOUNTER — OFFICE VISIT (OUTPATIENT)
Dept: INTERNAL MEDICINE | Age: 74
End: 2024-08-09

## 2024-08-09 VITALS
BODY MASS INDEX: 38.3 KG/M2 | DIASTOLIC BLOOD PRESSURE: 89 MMHG | WEIGHT: 244 LBS | OXYGEN SATURATION: 98 % | HEART RATE: 81 BPM | SYSTOLIC BLOOD PRESSURE: 138 MMHG | HEIGHT: 67 IN

## 2024-08-09 DIAGNOSIS — E78.5 HYPERLIPIDEMIA, UNSPECIFIED HYPERLIPIDEMIA TYPE: ICD-10-CM

## 2024-08-09 DIAGNOSIS — N18.9 CHRONIC KIDNEY DISEASE, UNSPECIFIED CKD STAGE: ICD-10-CM

## 2024-08-09 DIAGNOSIS — K21.9 GASTROESOPHAGEAL REFLUX DISEASE WITHOUT ESOPHAGITIS: ICD-10-CM

## 2024-08-09 DIAGNOSIS — Z86.718 HISTORY OF DVT IN ADULTHOOD: ICD-10-CM

## 2024-08-09 DIAGNOSIS — Z91.09 ENVIRONMENTAL ALLERGIES: ICD-10-CM

## 2024-08-09 DIAGNOSIS — Z79.899 MEDICATION MANAGEMENT: ICD-10-CM

## 2024-08-09 DIAGNOSIS — F32.89 OTHER DEPRESSION: ICD-10-CM

## 2024-08-09 DIAGNOSIS — E53.8 B12 DEFICIENCY: Primary | ICD-10-CM

## 2024-08-09 DIAGNOSIS — D50.9 IRON DEFICIENCY ANEMIA, UNSPECIFIED IRON DEFICIENCY ANEMIA TYPE: ICD-10-CM

## 2024-08-09 DIAGNOSIS — M54.50 CHRONIC LOW BACK PAIN, UNSPECIFIED BACK PAIN LATERALITY, UNSPECIFIED WHETHER SCIATICA PRESENT: ICD-10-CM

## 2024-08-09 DIAGNOSIS — J44.9 CHRONIC OBSTRUCTIVE PULMONARY DISEASE, UNSPECIFIED COPD TYPE (HCC): ICD-10-CM

## 2024-08-09 DIAGNOSIS — I48.91 ATRIAL FIBRILLATION, UNSPECIFIED TYPE (HCC): ICD-10-CM

## 2024-08-09 DIAGNOSIS — I47.10 SVT (SUPRAVENTRICULAR TACHYCARDIA) (HCC): ICD-10-CM

## 2024-08-09 DIAGNOSIS — E11.49 OTHER DIABETIC NEUROLOGICAL COMPLICATION ASSOCIATED WITH TYPE 2 DIABETES MELLITUS (HCC): ICD-10-CM

## 2024-08-09 DIAGNOSIS — I47.10 PAROXYSMAL SVT (SUPRAVENTRICULAR TACHYCARDIA) (HCC): Primary | ICD-10-CM

## 2024-08-09 DIAGNOSIS — E11.21 TYPE II DIABETES MELLITUS WITH NEPHROPATHY (HCC): ICD-10-CM

## 2024-08-09 DIAGNOSIS — G89.29 CHRONIC LOW BACK PAIN, UNSPECIFIED BACK PAIN LATERALITY, UNSPECIFIED WHETHER SCIATICA PRESENT: ICD-10-CM

## 2024-08-09 DIAGNOSIS — E03.9 HYPOTHYROIDISM, UNSPECIFIED TYPE: ICD-10-CM

## 2024-08-09 DIAGNOSIS — I10 PRIMARY HYPERTENSION: ICD-10-CM

## 2024-08-09 DIAGNOSIS — G47.30 SLEEP APNEA, UNSPECIFIED TYPE: ICD-10-CM

## 2024-08-09 DIAGNOSIS — E55.9 VITAMIN D DEFICIENCY: ICD-10-CM

## 2024-08-09 LAB
ALCOHOL URINE: NORMAL
AMPHETAMINE SCREEN URINE: NORMAL
BARBITURATE SCREEN URINE: NORMAL
BENZODIAZEPINE SCREEN, URINE: NORMAL
BUPRENORPHINE URINE: NORMAL
COCAINE METABOLITE SCREEN URINE: NORMAL
FENTANYL SCREEN, URINE: NORMAL
GABAPENTIN SCREEN, URINE: NORMAL
MDMA, URINE: NORMAL
METHADONE SCREEN, URINE: NORMAL
METHAMPHETAMINE, URINE: NORMAL
OPIATE SCREEN URINE: NORMAL
OXYCODONE SCREEN URINE: NORMAL
PHENCYCLIDINE SCREEN URINE: NORMAL
PROPOXYPHENE SCREEN, URINE: NORMAL
SYNTHETIC CANNABINOIDS(K2) SCREEN, URINE: NORMAL
THC SCREEN, URINE: NORMAL
TRAMADOL SCREEN URINE: NORMAL
TRICYCLIC ANTIDEPRESSANTS, UR: NORMAL

## 2024-08-09 RX ORDER — CYANOCOBALAMIN 1000 UG/ML
1000 INJECTION, SOLUTION INTRAMUSCULAR; SUBCUTANEOUS ONCE
Status: COMPLETED | OUTPATIENT
Start: 2024-08-09 | End: 2024-08-09

## 2024-08-09 RX ORDER — ZOSTER VACCINE RECOMBINANT, ADJUVANTED 50 MCG/0.5
0.5 KIT INTRAMUSCULAR SEE ADMIN INSTRUCTIONS
Qty: 0.5 ML | Refills: 0 | Status: SHIPPED | OUTPATIENT
Start: 2024-08-09 | End: 2025-02-05

## 2024-08-09 RX ORDER — SODIUM BICARBONATE 650 MG/1
650 TABLET ORAL 2 TIMES DAILY
Qty: 180 TABLET | Refills: 1 | Status: SHIPPED | OUTPATIENT
Start: 2024-08-09

## 2024-08-09 RX ADMIN — CYANOCOBALAMIN 1000 MCG: 1000 INJECTION, SOLUTION INTRAMUSCULAR; SUBCUTANEOUS at 10:16

## 2024-08-09 NOTE — PROGRESS NOTES
Chief Complaint   Patient presents with    3 Month Follow-Up     Pt has no concerns       HPI: Leny Stone is a 74 y.o. female is here for follow-up of hyperglycemia, type 2 diabetes, atrial fibrillation, supraventricular tachycardia, history of DVT, sick sinus syndrome, COPD, chronic kidney disease, hypertension, depression, chronic back pain, depression, hypothyroidism, acid reflux, hyperlipidemia and obesity.  She would like a B12 injection today.    Her blood sugars are averaging around 140.  Her A1c is 5.6.  Her cholesterol is 223.  Her HDL is 104.      She does have a history of coronary artery disease, sick sinus syndrome, supraventricular tachycardia and atrial fibrillation.  She does have a pacemaker in place.  She denies any complaints of chest pain, chest pressure or heart palpitations.  However, sometimes, she feels like she will pass out.  This usually occurs with position changes.  She has been advised to get up slowly.  She has been advised to make sure she is drinking plenty of fluids.    She also has a history of sleep apnea.  Sometimes, she has some difficulty with her CPAP mask.  Her provider, who follows her for her sleep apnea has recommended the inspire procedure.  She does complain of being fatigued.    Her blood pressure appears to be well-controlled.  She denies any complaints of chest pain, chest pressure or heart palpitations.  Her mood is relatively stable on her current medication regimen.  She does state that she was depressed at 1 time last week.  However, this has resolved.    She does take baclofen as needed for chronic back pain.  Her renal parameters have remained stable.  She does have a history of iron deficiency anemia which is stable on ferrous gluconate.    Her neuropathy is stable on her current dose of gabapentin.  She is aware that gabapentin can cause sedation, which can make her more prone to falls.    Her thyroid function is stable.    Her acid reflux is well-controlled

## 2024-08-09 NOTE — PROGRESS NOTES
After obtaining consent, and per orders of Dr. Curtis, injection of Vitamin B12 given in Right deltoid by Rula Borjas MA.

## 2024-08-11 SDOH — ECONOMIC STABILITY: FOOD INSECURITY: WITHIN THE PAST 12 MONTHS, THE FOOD YOU BOUGHT JUST DIDN'T LAST AND YOU DIDN'T HAVE MONEY TO GET MORE.: NEVER TRUE

## 2024-08-11 SDOH — ECONOMIC STABILITY: FOOD INSECURITY: WITHIN THE PAST 12 MONTHS, YOU WORRIED THAT YOUR FOOD WOULD RUN OUT BEFORE YOU GOT MONEY TO BUY MORE.: NEVER TRUE

## 2024-08-11 SDOH — ECONOMIC STABILITY: INCOME INSECURITY: HOW HARD IS IT FOR YOU TO PAY FOR THE VERY BASICS LIKE FOOD, HOUSING, MEDICAL CARE, AND HEATING?: NOT HARD AT ALL

## 2024-08-11 ASSESSMENT — PATIENT HEALTH QUESTIONNAIRE - PHQ9
3. TROUBLE FALLING OR STAYING ASLEEP: NOT AT ALL
SUM OF ALL RESPONSES TO PHQ QUESTIONS 1-9: 0
4. FEELING TIRED OR HAVING LITTLE ENERGY: NOT AT ALL
8. MOVING OR SPEAKING SO SLOWLY THAT OTHER PEOPLE COULD HAVE NOTICED. OR THE OPPOSITE, BEING SO FIGETY OR RESTLESS THAT YOU HAVE BEEN MOVING AROUND A LOT MORE THAN USUAL: NOT AT ALL
9. THOUGHTS THAT YOU WOULD BE BETTER OFF DEAD, OR OF HURTING YOURSELF: NOT AT ALL
7. TROUBLE CONCENTRATING ON THINGS, SUCH AS READING THE NEWSPAPER OR WATCHING TELEVISION: NOT AT ALL
SUM OF ALL RESPONSES TO PHQ9 QUESTIONS 1 & 2: 0
5. POOR APPETITE OR OVEREATING: NOT AT ALL
1. LITTLE INTEREST OR PLEASURE IN DOING THINGS: NOT AT ALL
SUM OF ALL RESPONSES TO PHQ QUESTIONS 1-9: 0
SUM OF ALL RESPONSES TO PHQ QUESTIONS 1-9: 0
2. FEELING DOWN, DEPRESSED OR HOPELESS: NOT AT ALL
6. FEELING BAD ABOUT YOURSELF - OR THAT YOU ARE A FAILURE OR HAVE LET YOURSELF OR YOUR FAMILY DOWN: NOT AT ALL
SUM OF ALL RESPONSES TO PHQ QUESTIONS 1-9: 0
10. IF YOU CHECKED OFF ANY PROBLEMS, HOW DIFFICULT HAVE THESE PROBLEMS MADE IT FOR YOU TO DO YOUR WORK, TAKE CARE OF THINGS AT HOME, OR GET ALONG WITH OTHER PEOPLE: NOT DIFFICULT AT ALL

## 2024-08-14 ENCOUNTER — ANTI-COAG VISIT (OUTPATIENT)
Dept: PRIMARY CARE CLINIC | Age: 74
End: 2024-08-14

## 2024-08-14 DIAGNOSIS — Z79.01 ANTICOAGULATED ON COUMADIN: Primary | ICD-10-CM

## 2024-08-14 LAB — INR BLD: 4.9

## 2024-08-14 NOTE — PROGRESS NOTES
HOME MONITORING REPORT    INR today:   Results for orders placed or performed in visit on 08/14/24   Protime-INR   Result Value Ref Range    INR 4.90        INR Goal: 2.0-3.0    Dosing Plan  As of 8/14/2024      TTR:  31.2% (6.2 y)   Full warfarin instructions:  8/14: Hold; 8/15: Hold; 8/16: 2.5 mg; Otherwise 5 mg every day              Patient states she took an extra dose this week.   PLAN: Advised patient/caregiver to hold her dose today and tomorrow. Reduce Friday dose to 2.5 mg. Then resume 5 mg a day and recheck on Monday.  Patient/Caregiver voiced understanding

## 2024-08-15 ENCOUNTER — OFFICE VISIT (OUTPATIENT)
Dept: CARDIOLOGY CLINIC | Age: 74
End: 2024-08-15
Payer: MEDICARE

## 2024-08-15 VITALS
BODY MASS INDEX: 37.35 KG/M2 | SYSTOLIC BLOOD PRESSURE: 130 MMHG | WEIGHT: 238 LBS | HEIGHT: 67 IN | DIASTOLIC BLOOD PRESSURE: 76 MMHG | HEART RATE: 90 BPM

## 2024-08-15 DIAGNOSIS — I49.5 SSS (SICK SINUS SYNDROME) (HCC): ICD-10-CM

## 2024-08-15 DIAGNOSIS — Z79.01 CHRONIC ANTICOAGULATION: ICD-10-CM

## 2024-08-15 DIAGNOSIS — I10 PRIMARY HYPERTENSION: ICD-10-CM

## 2024-08-15 DIAGNOSIS — Z95.0 PACEMAKER: ICD-10-CM

## 2024-08-15 DIAGNOSIS — I34.0 NONRHEUMATIC MITRAL VALVE REGURGITATION: ICD-10-CM

## 2024-08-15 DIAGNOSIS — I20.89 STABLE ANGINA (HCC): ICD-10-CM

## 2024-08-15 DIAGNOSIS — Z79.899 LONG TERM CURRENT USE OF ANTIARRHYTHMIC DRUG: ICD-10-CM

## 2024-08-15 DIAGNOSIS — I48.0 PAROXYSMAL ATRIAL FIBRILLATION (HCC): Primary | ICD-10-CM

## 2024-08-15 PROCEDURE — 3017F COLORECTAL CA SCREEN DOC REV: CPT | Performed by: INTERNAL MEDICINE

## 2024-08-15 PROCEDURE — 1036F TOBACCO NON-USER: CPT | Performed by: INTERNAL MEDICINE

## 2024-08-15 PROCEDURE — 93280 PM DEVICE PROGR EVAL DUAL: CPT | Performed by: INTERNAL MEDICINE

## 2024-08-15 PROCEDURE — G8417 CALC BMI ABV UP PARAM F/U: HCPCS | Performed by: INTERNAL MEDICINE

## 2024-08-15 PROCEDURE — 99213 OFFICE O/P EST LOW 20 MIN: CPT | Performed by: INTERNAL MEDICINE

## 2024-08-15 PROCEDURE — 1090F PRES/ABSN URINE INCON ASSESS: CPT | Performed by: INTERNAL MEDICINE

## 2024-08-15 PROCEDURE — 3075F SYST BP GE 130 - 139MM HG: CPT | Performed by: INTERNAL MEDICINE

## 2024-08-15 PROCEDURE — 3078F DIAST BP <80 MM HG: CPT | Performed by: INTERNAL MEDICINE

## 2024-08-15 PROCEDURE — G8427 DOCREV CUR MEDS BY ELIG CLIN: HCPCS | Performed by: INTERNAL MEDICINE

## 2024-08-15 PROCEDURE — 1123F ACP DISCUSS/DSCN MKR DOCD: CPT | Performed by: INTERNAL MEDICINE

## 2024-08-15 PROCEDURE — G8399 PT W/DXA RESULTS DOCUMENT: HCPCS | Performed by: INTERNAL MEDICINE

## 2024-08-15 NOTE — PROGRESS NOTES
[facility]                            Date:  8/15/2024  Patient: Leny Stone  Age:  74 y.o., 1950      Reason for evaluation:         SUBJECTIVE:    Returns today follow-up assessment paroxysmal atrial fibrillation pacemaker hypertension antiarrhythmic drug usage chronic anticoagulation sinus node dissection nonrheumatic mitral regurgitation stable angina overall doing reasonably well.  Denies palpitations.  INR high today adjusted by her primary care physician.  Denies bleeding issues denies chest pain denies dyspnea.  No other complaints or issues reported.  Device interrogated functioning appropriately.  Blood pressure 130/76 heart 90.    Review of Systems      OBJECTIVE:    /76   Pulse 90   Ht 1.702 m (5' 7\")   Wt 108 kg (238 lb)   BMI 37.28 kg/m²     Labs:   CBC: No results for input(s): \"WBC\", \"HGB\", \"HCT\", \"PLT\" in the last 72 hours.  BMP:No results for input(s): \"NA\", \"K\", \"CO2\", \"BUN\", \"CREATININE\", \"LABGLOM\", \"GLUCOSE\" in the last 72 hours.  BNP: No results for input(s): \"BNP\" in the last 72 hours.  PT/INR:   Recent Labs     08/14/24  0000   INR 4.90     APTT:No results for input(s): \"APTT\" in the last 72 hours.  CARDIAC ENZYMES:No results for input(s): \"CKTOTAL\", \"CKMB\", \"CKMBINDEX\", \"TROPONINI\" in the last 72 hours.  FASTING LIPID PANEL:  Lab Results   Component Value Date/Time     08/07/2024 10:01 AM    LDLDIRECT 95 02/26/2015 12:36 AM    TRIG 75 08/07/2024 10:01 AM     LIVER PROFILE:No results for input(s): \"AST\", \"ALT\", \"LABALBU\" in the last 72 hours.        Past Medical History:   Diagnosis Date    Abdominal pain     Abnormal EKG     Acute sinusitis     Acute superficial venous thrombosis of left lower extremity     ADHD (attention deficit hyperactivity disorder)     Allergic reaction to spider bite     Anemia     Anticoagulant long-term use     Anticoagulated     Anticoagulated on Coumadin     Anxiety     Arrhythmia     Asthmatic bronchitis without

## 2024-08-15 NOTE — PROGRESS NOTES
Pacemaker interrogated  Presenting rhythm:  AP VS, AP 96.4%,  0.3%  Battey voltage 4.5 years  Lead status:  Lead impedance within range and stable  Sensing:  P waves 1.9 mV,  R waves 4.6 mV  Thresholds:  Atrial 1.0V @ 0.4ms, ventricular 1.5@ 0.4ms  Observations:  3 monitored VT, 2 of the episodes appear to be brief SVt  See scanned report for details  Reprogramming for sensitivity and threshold testing  [facility]

## 2024-08-19 ENCOUNTER — ANTI-COAG VISIT (OUTPATIENT)
Dept: INTERNAL MEDICINE | Age: 74
End: 2024-08-19
Payer: MEDICARE

## 2024-08-19 DIAGNOSIS — Z79.01 ANTICOAGULATED ON COUMADIN: Primary | ICD-10-CM

## 2024-08-19 LAB
INR BLD: 3.9
INR BLD: 3.9

## 2024-08-19 PROCEDURE — 93793 ANTICOAG MGMT PT WARFARIN: CPT | Performed by: INTERNAL MEDICINE

## 2024-08-19 NOTE — PROGRESS NOTES
HOME MONITORING REPORT    INR today:   Results for orders placed or performed in visit on 08/19/24   Protime-INR   Result Value Ref Range    INR 3.90        INR Goal: 2.0-3.0    Dosing Plan  As of 8/19/2024      TTR:  31.1% (6.2 y)   Full warfarin instructions:  5 mg every day              I confirmed the coumadin dose as 5 Mg everyday. The patient held her coumadin on 8/14 and 8/15/24, she forgot to take a half tablet on 8/16. No changes to any medications or foods that would cause her INR to go up. Please advise.

## 2024-08-28 ENCOUNTER — ANTI-COAG VISIT (OUTPATIENT)
Dept: PRIMARY CARE CLINIC | Age: 74
End: 2024-08-28
Payer: MEDICARE

## 2024-08-28 ENCOUNTER — TELEPHONE (OUTPATIENT)
Dept: HEMATOLOGY | Age: 74
End: 2024-08-28

## 2024-08-28 DIAGNOSIS — Z79.01 ANTICOAGULATED ON COUMADIN: Primary | ICD-10-CM

## 2024-08-28 LAB — INR BLD: 1.4

## 2024-08-28 PROCEDURE — 93793 ANTICOAG MGMT PT WARFARIN: CPT | Performed by: INTERNAL MEDICINE

## 2024-08-28 NOTE — TELEPHONE ENCOUNTER
Called Patient and reminded patient of their appointment on 09/04/2024 and patient confirmed they would be here. Reminded patient to just come at appointment time, and to not come at the lab appointment time. Reminded patient that we will not check them in any more than 30 minutes before appointment time.  We have now moved to the [facility] [place] that is located between our old office and the ER at the [facility]

## 2024-08-28 NOTE — PROGRESS NOTES
HOME MONITORING REPORT    INR today:   Results for orders placed or performed in visit on 08/28/24   Protime-INR   Result Value Ref Range    INR 1.40        INR Goal: 2.0-3.0    Dosing Plan  As of 8/28/2024      TTR:  31.2% (6.2 y)   Full warfarin instructions:  5 mg every day            Patient skipped her dose Saturday and Sunday.    PLAN: Advised patient/caregiver to resume her dose of 5 mg daily and be sure to take it every night. Recheck in one week.  Patient/Caregiver voiced understanding

## 2024-09-02 DIAGNOSIS — I10 PRIMARY HYPERTENSION: ICD-10-CM

## 2024-09-02 DIAGNOSIS — D69.6 THROMBOCYTOPENIA (HCC): Primary | ICD-10-CM

## 2024-09-04 ENCOUNTER — HOSPITAL ENCOUNTER (OUTPATIENT)
Dept: INFUSION THERAPY | Age: 74
Discharge: HOME | End: 2024-09-04
Payer: MEDICARE

## 2024-09-04 ENCOUNTER — OFFICE VISIT (OUTPATIENT)
Dept: HEMATOLOGY | Age: 74
End: 2024-09-04
Payer: MEDICARE

## 2024-09-04 VITALS
HEIGHT: 67 IN | BODY MASS INDEX: 37.29 KG/M2 | WEIGHT: 237.6 LBS | TEMPERATURE: 97.9 F | DIASTOLIC BLOOD PRESSURE: 86 MMHG | SYSTOLIC BLOOD PRESSURE: 122 MMHG | OXYGEN SATURATION: 98 % | HEART RATE: 88 BPM

## 2024-09-04 DIAGNOSIS — D22.9 CHANGE IN MOLE: ICD-10-CM

## 2024-09-04 DIAGNOSIS — D69.6 THROMBOCYTOPENIA (HCC): ICD-10-CM

## 2024-09-04 DIAGNOSIS — R42 DIZZINESS: ICD-10-CM

## 2024-09-04 DIAGNOSIS — N18.32 STAGE 3B CHRONIC KIDNEY DISEASE (HCC): ICD-10-CM

## 2024-09-04 DIAGNOSIS — D50.9 IRON DEFICIENCY ANEMIA, UNSPECIFIED IRON DEFICIENCY ANEMIA TYPE: Primary | ICD-10-CM

## 2024-09-04 DIAGNOSIS — I10 PRIMARY HYPERTENSION: ICD-10-CM

## 2024-09-04 LAB
ALBUMIN SERPL-MCNC: 4.3 G/DL (ref 3.5–5.2)
ALP SERPL-CCNC: 63 U/L (ref 35–104)
ALT SERPL-CCNC: 10 U/L (ref 5–33)
ANION GAP SERPL CALCULATED.3IONS-SCNC: 16 MMOL/L (ref 7–19)
AST SERPL-CCNC: 27 U/L (ref 5–32)
BASOPHILS # BLD: 0.04 K/UL (ref 0.01–0.08)
BASOPHILS NFR BLD: 0.6 % (ref 0.1–1.2)
BILIRUB SERPL-MCNC: 0.5 MG/DL (ref 0–1.2)
BUN SERPL-MCNC: 28 MG/DL (ref 8–23)
CALCIUM SERPL-MCNC: 9.3 MG/DL (ref 8.8–10.2)
CHLORIDE SERPL-SCNC: 103 MMOL/L (ref 98–107)
CO2 SERPL-SCNC: 20 MMOL/L (ref 22–29)
CREAT SERPL-MCNC: 2.2 MG/DL (ref 0.5–0.9)
EOSINOPHIL # BLD: 0.12 K/UL (ref 0.04–0.54)
EOSINOPHIL NFR BLD: 1.9 % (ref 0.7–7)
ERYTHROCYTE [DISTWIDTH] IN BLOOD BY AUTOMATED COUNT: 16 % (ref 11.7–14.4)
GLUCOSE SERPL-MCNC: 89 MG/DL (ref 70–99)
HCT VFR BLD AUTO: 36.9 % (ref 34.1–44.9)
HGB BLD-MCNC: 12 G/DL (ref 11.2–15.7)
LYMPHOCYTES # BLD: 2.24 K/UL (ref 1.18–3.74)
LYMPHOCYTES NFR BLD: 35.6 % (ref 19.3–53.1)
MCH RBC QN AUTO: 30 PG (ref 25.6–32.2)
MCHC RBC AUTO-ENTMCNC: 32.5 G/DL (ref 32.3–35.5)
MCV RBC AUTO: 92.3 FL (ref 79.4–94.8)
MONOCYTES # BLD: 0.75 K/UL (ref 0.24–0.82)
MONOCYTES NFR BLD: 11.9 % (ref 4.7–12.5)
NEUTROPHILS # BLD: 3.1 K/UL (ref 1.56–6.13)
NEUTS SEG NFR BLD: 49.2 % (ref 34–71.1)
PLATELET # BLD AUTO: 171 K/UL (ref 182–369)
PMV BLD AUTO: 11.7 FL (ref 7.4–10.4)
POTASSIUM SERPL-SCNC: 4.4 MMOL/L (ref 3.5–5.1)
PROT SERPL-MCNC: 8.2 G/DL (ref 6.4–8.3)
RBC # BLD AUTO: 4 M/UL (ref 3.93–5.22)
SODIUM SERPL-SCNC: 139 MMOL/L (ref 136–145)
WBC # BLD AUTO: 6.3 K/UL (ref 3.98–10.04)

## 2024-09-04 PROCEDURE — 36415 COLL VENOUS BLD VENIPUNCTURE: CPT

## 2024-09-04 PROCEDURE — 99213 OFFICE O/P EST LOW 20 MIN: CPT

## 2024-09-04 PROCEDURE — G8399 PT W/DXA RESULTS DOCUMENT: HCPCS | Performed by: NURSE PRACTITIONER

## 2024-09-04 PROCEDURE — 85025 COMPLETE CBC W/AUTO DIFF WBC: CPT

## 2024-09-04 PROCEDURE — 3079F DIAST BP 80-89 MM HG: CPT | Performed by: NURSE PRACTITIONER

## 2024-09-04 PROCEDURE — G8417 CALC BMI ABV UP PARAM F/U: HCPCS | Performed by: NURSE PRACTITIONER

## 2024-09-04 PROCEDURE — 1123F ACP DISCUSS/DSCN MKR DOCD: CPT | Performed by: NURSE PRACTITIONER

## 2024-09-04 PROCEDURE — G8427 DOCREV CUR MEDS BY ELIG CLIN: HCPCS | Performed by: NURSE PRACTITIONER

## 2024-09-04 PROCEDURE — 3017F COLORECTAL CA SCREEN DOC REV: CPT | Performed by: NURSE PRACTITIONER

## 2024-09-04 PROCEDURE — 1090F PRES/ABSN URINE INCON ASSESS: CPT | Performed by: NURSE PRACTITIONER

## 2024-09-04 PROCEDURE — 1036F TOBACCO NON-USER: CPT | Performed by: NURSE PRACTITIONER

## 2024-09-04 PROCEDURE — 3074F SYST BP LT 130 MM HG: CPT | Performed by: NURSE PRACTITIONER

## 2024-09-04 PROCEDURE — 80053 COMPREHEN METABOLIC PANEL: CPT

## 2024-09-04 PROCEDURE — 99214 OFFICE O/P EST MOD 30 MIN: CPT | Performed by: NURSE PRACTITIONER

## 2024-09-05 ENCOUNTER — ANTI-COAG VISIT (OUTPATIENT)
Dept: PRIMARY CARE CLINIC | Age: 74
End: 2024-09-05

## 2024-09-05 DIAGNOSIS — N18.9 CHRONIC KIDNEY DISEASE, UNSPECIFIED CKD STAGE: Primary | ICD-10-CM

## 2024-09-05 DIAGNOSIS — Z79.01 ANTICOAGULATED ON COUMADIN: Primary | ICD-10-CM

## 2024-09-05 LAB — INR BLD: 2.3

## 2024-09-05 NOTE — PROGRESS NOTES
HOME MONITORING REPORT    INR today:   Results for orders placed or performed in visit on 09/05/24   Protime-INR   Result Value Ref Range    INR 2.30        INR Goal: 2.0-3.0    Dosing Plan  As of 9/5/2024      TTR:  31.2% (6.3 y)   Full warfarin instructions:  5 mg every day                PLAN: Advised patient/caregiver to continue current dose and recheck in one week.  Patient/Caregiver voiced understanding

## 2024-09-06 ENCOUNTER — TELEPHONE (OUTPATIENT)
Dept: HEMATOLOGY | Age: 74
End: 2024-09-06

## 2024-09-06 NOTE — TELEPHONE ENCOUNTER
----- Message from Malou LAMA sent at 9/5/2024  7:33 AM CDT -----  Please call Leny and instruct her to increase her hydration, I have forwarded her labs to Dr. Curtis for review

## 2024-09-06 NOTE — TELEPHONE ENCOUNTER
Tried to call patient regarding lab results.  Left message for patient to return call.  No return call received at this time.

## 2024-09-09 ASSESSMENT — ENCOUNTER SYMPTOMS
BLOOD IN STOOL: 0
GASTROINTESTINAL NEGATIVE: 1
WHEEZING: 0
RESPIRATORY NEGATIVE: 1
ABDOMINAL PAIN: 0
SHORTNESS OF BREATH: 0
CONSTIPATION: 0
NAUSEA: 0
BACK PAIN: 0
SORE THROAT: 0
COUGH: 0
VOMITING: 0
EYE DISCHARGE: 0
EYE REDNESS: 0
EYES NEGATIVE: 1
EYE PAIN: 0
DIARRHEA: 0

## 2024-09-11 ENCOUNTER — ANTI-COAG VISIT (OUTPATIENT)
Dept: PRIMARY CARE CLINIC | Age: 74
End: 2024-09-11
Payer: MEDICARE

## 2024-09-11 DIAGNOSIS — Z79.01 ANTICOAGULATED ON COUMADIN: Primary | ICD-10-CM

## 2024-09-11 LAB — INR BLD: 2.3

## 2024-09-11 PROCEDURE — 93793 ANTICOAG MGMT PT WARFARIN: CPT | Performed by: INTERNAL MEDICINE

## 2024-09-18 ENCOUNTER — NURSE ONLY (OUTPATIENT)
Dept: INTERNAL MEDICINE | Age: 74
End: 2024-09-18
Payer: MEDICARE

## 2024-09-18 DIAGNOSIS — E53.8 B12 DEFICIENCY: Primary | ICD-10-CM

## 2024-09-18 DIAGNOSIS — N18.9 CHRONIC KIDNEY DISEASE, UNSPECIFIED CKD STAGE: ICD-10-CM

## 2024-09-18 LAB
ANION GAP SERPL CALCULATED.3IONS-SCNC: 13 MMOL/L (ref 7–19)
BUN SERPL-MCNC: 26 MG/DL (ref 8–23)
CALCIUM SERPL-MCNC: 9 MG/DL (ref 8.8–10.2)
CHLORIDE SERPL-SCNC: 103 MMOL/L (ref 98–111)
CO2 SERPL-SCNC: 23 MMOL/L (ref 22–29)
CREAT SERPL-MCNC: 1.5 MG/DL (ref 0.5–0.9)
GLUCOSE SERPL-MCNC: 84 MG/DL (ref 70–99)
POTASSIUM SERPL-SCNC: 4.6 MMOL/L (ref 3.5–5)
SODIUM SERPL-SCNC: 139 MMOL/L (ref 136–145)

## 2024-09-18 PROCEDURE — 96372 THER/PROPH/DIAG INJ SC/IM: CPT | Performed by: INTERNAL MEDICINE

## 2024-09-18 RX ORDER — CYANOCOBALAMIN 1000 UG/ML
1000 INJECTION, SOLUTION INTRAMUSCULAR; SUBCUTANEOUS ONCE
Status: COMPLETED | OUTPATIENT
Start: 2024-09-18 | End: 2024-09-18

## 2024-09-18 RX ADMIN — CYANOCOBALAMIN 1000 MCG: 1000 INJECTION, SOLUTION INTRAMUSCULAR; SUBCUTANEOUS at 12:18

## 2024-09-19 ENCOUNTER — ANTI-COAG VISIT (OUTPATIENT)
Dept: PRIMARY CARE CLINIC | Age: 74
End: 2024-09-19
Payer: MEDICARE

## 2024-09-19 DIAGNOSIS — Z79.01 ANTICOAGULATED ON COUMADIN: Primary | ICD-10-CM

## 2024-09-19 LAB — INR BLD: 3

## 2024-09-19 PROCEDURE — 93793 ANTICOAG MGMT PT WARFARIN: CPT | Performed by: INTERNAL MEDICINE

## 2024-09-25 ENCOUNTER — ANTI-COAG VISIT (OUTPATIENT)
Dept: PRIMARY CARE CLINIC | Age: 74
End: 2024-09-25
Payer: MEDICARE

## 2024-09-25 DIAGNOSIS — Z79.01 ANTICOAGULATED ON COUMADIN: Primary | ICD-10-CM

## 2024-09-25 LAB — INR BLD: 1.3

## 2024-09-25 PROCEDURE — 93793 ANTICOAG MGMT PT WARFARIN: CPT | Performed by: INTERNAL MEDICINE

## 2024-09-25 NOTE — PROGRESS NOTES
HOME MONITORING REPORT    INR today:   Results for orders placed or performed in visit on 09/25/24   Protime-INR   Result Value Ref Range    INR 1.30        INR Goal: 2.0-3.0    Dosing Plan  As of 9/25/2024      TTR:  31.7% (6.3 y)   Full warfarin instructions:  9/25: 10 mg; 9/26: 10 mg; Otherwise 5 mg every day            Patient states she was feeling really lousy for four days so she stopped taking all of her medications. Advised of the importance of taking all of her medications every day.    PLAN: Advised patient/caregiver to increase her dose tonight and tomorrow night to 10 mg, then continue current dose of 5 mg a day and recheck in one week.  Patient/Caregiver voiced understanding    I have reviewed nursing plan for Coumadin management and agree with plan.

## 2024-09-26 DIAGNOSIS — R12 CHRONIC HEARTBURN: ICD-10-CM

## 2024-09-26 DIAGNOSIS — E78.5 HYPERLIPIDEMIA, UNSPECIFIED HYPERLIPIDEMIA TYPE: ICD-10-CM

## 2024-09-26 DIAGNOSIS — I10 PRIMARY HYPERTENSION: ICD-10-CM

## 2024-09-26 DIAGNOSIS — N18.30 STAGE 3 CHRONIC KIDNEY DISEASE, UNSPECIFIED WHETHER STAGE 3A OR 3B CKD (HCC): ICD-10-CM

## 2024-09-26 DIAGNOSIS — K52.9 GASTROENTERITIS: ICD-10-CM

## 2024-09-26 DIAGNOSIS — K21.9 GASTROESOPHAGEAL REFLUX DISEASE WITHOUT ESOPHAGITIS: ICD-10-CM

## 2024-09-26 RX ORDER — BUMETANIDE 1 MG/1
1 TABLET ORAL DAILY
Qty: 90 TABLET | Refills: 1 | Status: SHIPPED | OUTPATIENT
Start: 2024-09-26

## 2024-09-26 RX ORDER — PANTOPRAZOLE SODIUM 40 MG/1
TABLET, DELAYED RELEASE ORAL
Qty: 180 TABLET | Refills: 1 | Status: SHIPPED | OUTPATIENT
Start: 2024-09-26

## 2024-09-26 RX ORDER — ROSUVASTATIN CALCIUM 5 MG/1
5 TABLET, COATED ORAL DAILY
Qty: 90 TABLET | Refills: 1 | Status: SHIPPED | OUTPATIENT
Start: 2024-09-26

## 2024-09-26 RX ORDER — CALCITRIOL 0.25 UG/1
0.25 CAPSULE, LIQUID FILLED ORAL DAILY
Qty: 90 CAPSULE | Refills: 1 | Status: SHIPPED | OUTPATIENT
Start: 2024-09-26

## 2024-10-02 ENCOUNTER — ANTI-COAG VISIT (OUTPATIENT)
Dept: PRIMARY CARE CLINIC | Age: 74
End: 2024-10-02
Payer: MEDICARE

## 2024-10-02 ENCOUNTER — HOSPITAL ENCOUNTER (OUTPATIENT)
Dept: MRI IMAGING | Age: 74
Discharge: HOME | End: 2024-09-26

## 2024-10-02 DIAGNOSIS — F41.9 ANXIETY AND DEPRESSION: ICD-10-CM

## 2024-10-02 DIAGNOSIS — Z79.01 ANTICOAGULATED ON COUMADIN: Primary | ICD-10-CM

## 2024-10-02 DIAGNOSIS — R42 DIZZINESS: ICD-10-CM

## 2024-10-02 DIAGNOSIS — F32.A ANXIETY AND DEPRESSION: ICD-10-CM

## 2024-10-02 LAB
INR BLD: 3.3
INR BLD: 3.3

## 2024-10-02 PROCEDURE — 93793 ANTICOAG MGMT PT WARFARIN: CPT | Performed by: INTERNAL MEDICINE

## 2024-10-02 RX ORDER — ESCITALOPRAM OXALATE 20 MG/1
20 TABLET ORAL DAILY
Qty: 90 TABLET | Refills: 1 | Status: SHIPPED | OUTPATIENT
Start: 2024-10-02

## 2024-10-02 NOTE — PROGRESS NOTES
HOME MONITORING REPORT    INR today:   Results for orders placed or performed in visit on 10/02/24   Protime-INR   Result Value Ref Range    INR 3.30    Protime-INR   Result Value Ref Range    INR 3.30        INR Goal: 2.0-3.0    Dosing Plan  As of 10/2/2024      TTR:  31.7% (6.3 y)   Full warfarin instructions:  10/2: Hold; Otherwise 5 mg every day                PLAN: Advised patient/caregiver to hold her dose tonight only, then continue current dose and recheck in one week.  Patient/Caregiver voiced understanding    I have reviewed nursing plan for Coumadin management and agree with plan.

## 2024-10-07 LAB — INR BLD: 2.9

## 2024-10-15 DIAGNOSIS — Z79.899 ON AMIODARONE THERAPY: Primary | ICD-10-CM

## 2024-10-15 RX ORDER — AMIODARONE HYDROCHLORIDE 200 MG/1
100 TABLET ORAL DAILY
Qty: 30 TABLET | Refills: 0 | Status: SHIPPED | OUTPATIENT
Start: 2024-10-15

## 2024-10-17 DIAGNOSIS — Z12.31 ENCOUNTER FOR SCREENING MAMMOGRAM FOR MALIGNANT NEOPLASM OF BREAST: Primary | ICD-10-CM

## 2024-10-18 ENCOUNTER — ANTI-COAG VISIT (OUTPATIENT)
Dept: PRIMARY CARE CLINIC | Age: 74
End: 2024-10-18

## 2024-10-18 ENCOUNTER — HOSPITAL ENCOUNTER (OUTPATIENT)
Dept: MRI IMAGING | Age: 74
Discharge: HOME | End: 2024-10-18
Payer: MEDICARE

## 2024-10-18 DIAGNOSIS — Z79.01 ANTICOAGULATED ON COUMADIN: Primary | ICD-10-CM

## 2024-10-18 DIAGNOSIS — I48.91 ATRIAL FIBRILLATION, UNSPECIFIED TYPE (HCC): Primary | ICD-10-CM

## 2024-10-18 LAB — INR BLD: 1.9

## 2024-10-18 PROCEDURE — A9577 INJ MULTIHANCE: HCPCS | Performed by: NURSE PRACTITIONER

## 2024-10-18 PROCEDURE — 70553 MRI BRAIN STEM W/O & W/DYE: CPT

## 2024-10-18 PROCEDURE — 6360000004 HC RX CONTRAST MEDICATION: Performed by: NURSE PRACTITIONER

## 2024-10-18 RX ORDER — METOPROLOL TARTRATE 25 MG/1
25 TABLET, FILM COATED ORAL 2 TIMES DAILY
Qty: 60 TABLET | Refills: 5 | Status: SHIPPED | OUTPATIENT
Start: 2024-10-18

## 2024-10-18 RX ADMIN — GADOBENATE DIMEGLUMINE 20 ML: 529 INJECTION, SOLUTION INTRAVENOUS at 11:42

## 2024-10-18 NOTE — PROGRESS NOTES
HOME MONITORING REPORT    INR today:   Results for orders placed or performed in visit on 10/18/24   Protime-INR   Result Value Ref Range    INR 1.90        INR Goal: 2.0-3.0    Dosing Plan  As of 10/18/2024      TTR:  32.0% (6.4 y)   Full warfarin instructions:  5 mg every day              Advised her not to miss any doses.   PLAN: Advised patient/caregiver to continue current dose and recheck in one week.  Patient/Caregiver voiced understanding    Electronically signed by Regina Curtis MD on 10/18/2024 at 11:17 AM

## 2024-10-18 NOTE — PROGRESS NOTES
HOME MONITORING REPORT    INR today:   Results for orders placed or performed in visit on 10/18/24   Protime-INR   Result Value Ref Range    INR 2.90        INR Goal: 2.0-3.0    Dosing Plan  As of 10/18/2024      TTR:  32.0% (6.4 y)   Full warfarin instructions:  5 mg every day                PLAN: Patient aware to continue current dose and recheck in one week or as ordered.     Electronically signed by Regina Curtis MD on 10/18/2024 at 11:18 AM

## 2024-10-21 ENCOUNTER — HOSPITAL ENCOUNTER (OUTPATIENT)
Dept: WOMENS IMAGING | Age: 74
Discharge: HOME | End: 2024-10-21
Payer: MEDICARE

## 2024-10-21 DIAGNOSIS — Z12.31 ENCOUNTER FOR SCREENING MAMMOGRAM FOR MALIGNANT NEOPLASM OF BREAST: ICD-10-CM

## 2024-10-21 PROCEDURE — 77063 BREAST TOMOSYNTHESIS BI: CPT

## 2024-10-24 ENCOUNTER — ANTI-COAG VISIT (OUTPATIENT)
Dept: PRIMARY CARE CLINIC | Age: 74
End: 2024-10-24

## 2024-10-24 DIAGNOSIS — Z79.01 ANTICOAGULATED ON COUMADIN: Primary | ICD-10-CM

## 2024-10-24 LAB — INR BLD: 2.1

## 2024-10-24 NOTE — PROGRESS NOTES
HOME MONITORING REPORT    INR today:   Results for orders placed or performed in visit on 10/24/24   Protime-INR   Result Value Ref Range    INR 2.10        INR Goal: 2.0-3.0    Dosing Plan  As of 10/24/2024      TTR:  32.0% (6.4 y)   Full warfarin instructions:  5 mg every day                PLAN: Patient aware to continue current dose and recheck in one week or as ordered.     Electronically signed by Regina Curtis MD on 10/24/2024 at 2:36 PM

## 2024-10-29 RX ORDER — LISINOPRIL 10 MG/1
10 TABLET ORAL DAILY
OUTPATIENT
Start: 2024-10-29

## 2024-10-30 ENCOUNTER — ANTI-COAG VISIT (OUTPATIENT)
Dept: PRIMARY CARE CLINIC | Age: 74
End: 2024-10-30

## 2024-10-30 DIAGNOSIS — Z79.01 ANTICOAGULATED ON COUMADIN: Primary | ICD-10-CM

## 2024-10-30 LAB — INR BLD: 2

## 2024-10-30 NOTE — PROGRESS NOTES
HOME MONITORING REPORT    INR today:   Results for orders placed or performed in visit on 10/30/24   Protime-INR   Result Value Ref Range    INR 2.00        INR Goal: 2.0-3.0    Dosing Plan  As of 10/30/2024      TTR:  32.2% (6.4 y)   Full warfarin instructions:  5 mg every day                PLAN: Patient aware to continue current dose and recheck in one week or as ordered.     Electronically signed by Regina Curtis MD on 10/30/2024 at 12:00 PM

## 2024-10-31 RX ORDER — SEMAGLUTIDE 0.68 MG/ML
INJECTION, SOLUTION SUBCUTANEOUS
Refills: 0 | OUTPATIENT
Start: 2024-10-31

## 2024-11-06 ENCOUNTER — ANTI-COAG VISIT (OUTPATIENT)
Dept: PRIMARY CARE CLINIC | Age: 74
End: 2024-11-06

## 2024-11-06 DIAGNOSIS — Z79.01 ANTICOAGULATED ON COUMADIN: Primary | ICD-10-CM

## 2024-11-06 LAB — INR BLD: 3.4

## 2024-11-06 NOTE — PROGRESS NOTES
HOME MONITORING REPORT    INR today:   Results for orders placed or performed in visit on 11/06/24   Protime-INR   Result Value Ref Range    INR 3.40        INR Goal: 2.0-3.0    Dosing Plan  As of 11/6/2024      TTR:  32.3% (6.4 y)   Full warfarin instructions:  11/6: 2.5 mg; Otherwise 5 mg every day                PLAN: Advised patient/caregiver to reduce her dose tonight to 2.5 mg, then continue current dose and recheck in one week.  Patient/Caregiver voiced understanding    Electronically signed by Regina Curtis MD on 11/7/2024 at 5:26 AM

## 2024-11-13 ENCOUNTER — ANTI-COAG VISIT (OUTPATIENT)
Dept: PRIMARY CARE CLINIC | Age: 74
End: 2024-11-13

## 2024-11-13 ENCOUNTER — OFFICE VISIT (OUTPATIENT)
Dept: INTERNAL MEDICINE | Age: 74
End: 2024-11-13

## 2024-11-13 VITALS
HEIGHT: 67 IN | HEART RATE: 88 BPM | SYSTOLIC BLOOD PRESSURE: 137 MMHG | DIASTOLIC BLOOD PRESSURE: 82 MMHG | BODY MASS INDEX: 38.77 KG/M2 | OXYGEN SATURATION: 95 % | WEIGHT: 247 LBS

## 2024-11-13 DIAGNOSIS — Z79.01 ANTICOAGULATED ON COUMADIN: Primary | ICD-10-CM

## 2024-11-13 DIAGNOSIS — I48.91 ATRIAL FIBRILLATION, UNSPECIFIED TYPE (HCC): ICD-10-CM

## 2024-11-13 DIAGNOSIS — E11.21 TYPE II DIABETES MELLITUS WITH NEPHROPATHY (HCC): ICD-10-CM

## 2024-11-13 DIAGNOSIS — E03.9 HYPOTHYROIDISM, UNSPECIFIED TYPE: ICD-10-CM

## 2024-11-13 DIAGNOSIS — H35.029: ICD-10-CM

## 2024-11-13 DIAGNOSIS — E53.8 B12 DEFICIENCY: ICD-10-CM

## 2024-11-13 DIAGNOSIS — D50.9 IRON DEFICIENCY ANEMIA, UNSPECIFIED IRON DEFICIENCY ANEMIA TYPE: ICD-10-CM

## 2024-11-13 DIAGNOSIS — I82.5Y9 CHRONIC DEEP VEIN THROMBOSIS (DVT) OF PROXIMAL VEIN OF LOWER EXTREMITY, UNSPECIFIED LATERALITY (HCC): ICD-10-CM

## 2024-11-13 DIAGNOSIS — F41.9 ANXIETY AND DEPRESSION: ICD-10-CM

## 2024-11-13 DIAGNOSIS — G89.29 CHRONIC LOW BACK PAIN, UNSPECIFIED BACK PAIN LATERALITY, UNSPECIFIED WHETHER SCIATICA PRESENT: ICD-10-CM

## 2024-11-13 DIAGNOSIS — E78.5 HYPERLIPIDEMIA, UNSPECIFIED HYPERLIPIDEMIA TYPE: ICD-10-CM

## 2024-11-13 DIAGNOSIS — M54.50 CHRONIC LOW BACK PAIN, UNSPECIFIED BACK PAIN LATERALITY, UNSPECIFIED WHETHER SCIATICA PRESENT: ICD-10-CM

## 2024-11-13 DIAGNOSIS — E11.21 TYPE II DIABETES MELLITUS WITH NEPHROPATHY (HCC): Primary | ICD-10-CM

## 2024-11-13 DIAGNOSIS — F32.A ANXIETY AND DEPRESSION: ICD-10-CM

## 2024-11-13 DIAGNOSIS — I10 PRIMARY HYPERTENSION: ICD-10-CM

## 2024-11-13 DIAGNOSIS — E11.49 OTHER DIABETIC NEUROLOGICAL COMPLICATION ASSOCIATED WITH TYPE 2 DIABETES MELLITUS (HCC): ICD-10-CM

## 2024-11-13 DIAGNOSIS — G47.30 SLEEP APNEA, UNSPECIFIED TYPE: ICD-10-CM

## 2024-11-13 DIAGNOSIS — Z91.09 ENVIRONMENTAL ALLERGIES: ICD-10-CM

## 2024-11-13 DIAGNOSIS — K21.9 GASTROESOPHAGEAL REFLUX DISEASE WITHOUT ESOPHAGITIS: ICD-10-CM

## 2024-11-13 DIAGNOSIS — N18.9 CHRONIC KIDNEY DISEASE, UNSPECIFIED CKD STAGE: ICD-10-CM

## 2024-11-13 DIAGNOSIS — Z79.899 ON AMIODARONE THERAPY: ICD-10-CM

## 2024-11-13 DIAGNOSIS — E55.9 VITAMIN D DEFICIENCY: ICD-10-CM

## 2024-11-13 LAB
25(OH)D3 SERPL-MCNC: 40.8 NG/ML
ALBUMIN SERPL-MCNC: 4.1 G/DL (ref 3.5–5.2)
ALP SERPL-CCNC: 65 U/L (ref 35–104)
ALT SERPL-CCNC: 12 U/L (ref 5–33)
ANION GAP SERPL CALCULATED.3IONS-SCNC: 10 MMOL/L (ref 7–19)
AST SERPL-CCNC: 22 U/L (ref 5–32)
BASOPHILS # BLD: 0 K/UL (ref 0–0.2)
BASOPHILS NFR BLD: 0.7 % (ref 0–1)
BILIRUB SERPL-MCNC: 0.3 MG/DL (ref 0.2–1.2)
BUN SERPL-MCNC: 25 MG/DL (ref 8–23)
CALCIUM SERPL-MCNC: 8.9 MG/DL (ref 8.8–10.2)
CHLORIDE SERPL-SCNC: 108 MMOL/L (ref 98–111)
CHOLEST SERPL-MCNC: 215 MG/DL (ref 0–199)
CO2 SERPL-SCNC: 26 MMOL/L (ref 22–29)
CREAT SERPL-MCNC: 1.5 MG/DL (ref 0.5–0.9)
CREAT UR-MCNC: 171.9 MG/DL (ref 28–217)
EOSINOPHIL # BLD: 0.1 K/UL (ref 0–0.6)
EOSINOPHIL NFR BLD: 1.7 % (ref 0–5)
ERYTHROCYTE [DISTWIDTH] IN BLOOD BY AUTOMATED COUNT: 16 % (ref 11.5–14.5)
FERRITIN SERPL-MCNC: 190.2 NG/ML (ref 13–150)
GLUCOSE SERPL-MCNC: 92 MG/DL (ref 70–99)
HBA1C MFR BLD: 5.8 % (ref 4–5.6)
HCT VFR BLD AUTO: 35.5 % (ref 37–47)
HDLC SERPL-MCNC: 101 MG/DL (ref 40–60)
HGB BLD-MCNC: 11 G/DL (ref 12–16)
IMM GRANULOCYTES # BLD: 0 K/UL
INR BLD: 5
IRON SATN MFR SERPL: 25 % (ref 14–50)
IRON SERPL-MCNC: 75 UG/DL (ref 37–145)
LDLC SERPL CALC-MCNC: 99 MG/DL
LYMPHOCYTES # BLD: 1.7 K/UL (ref 1.1–4.5)
LYMPHOCYTES NFR BLD: 40.8 % (ref 20–40)
MCH RBC QN AUTO: 29.6 PG (ref 27–31)
MCHC RBC AUTO-ENTMCNC: 31 G/DL (ref 33–37)
MCV RBC AUTO: 95.7 FL (ref 81–99)
MICROALBUMIN UR-MCNC: <1.2 MG/DL (ref 0–1.99)
MICROALBUMIN/CREAT UR-RTO: NORMAL MG/G
MONOCYTES # BLD: 0.6 K/UL (ref 0–0.9)
MONOCYTES NFR BLD: 13.1 % (ref 0–10)
NEUTROPHILS # BLD: 1.8 K/UL (ref 1.5–7.5)
NEUTS SEG NFR BLD: 43.5 % (ref 50–65)
PLATELET # BLD AUTO: 176 K/UL (ref 130–400)
PMV BLD AUTO: 12 FL (ref 9.4–12.3)
POTASSIUM SERPL-SCNC: 4.6 MMOL/L (ref 3.5–5)
PROT SERPL-MCNC: 7.7 G/DL (ref 6.4–8.3)
RBC # BLD AUTO: 3.71 M/UL (ref 4.2–5.4)
SODIUM SERPL-SCNC: 144 MMOL/L (ref 136–145)
T3FREE SERPL-MCNC: 1.4 PG/ML (ref 2–4.4)
T4 FREE SERPL-MCNC: 1.36 NG/DL (ref 0.93–1.7)
TIBC SERPL-MCNC: 299 UG/DL (ref 250–400)
TRIGL SERPL-MCNC: 76 MG/DL (ref 0–149)
TSH SERPL DL<=0.005 MIU/L-ACNC: 4.58 UIU/ML (ref 0.27–4.2)
VIT B12 SERPL-MCNC: 764 PG/ML (ref 232–1245)
WBC # BLD AUTO: 4.2 K/UL (ref 4.8–10.8)

## 2024-11-13 RX ORDER — CYANOCOBALAMIN 1000 UG/ML
1000 INJECTION, SOLUTION INTRAMUSCULAR; SUBCUTANEOUS ONCE
Status: COMPLETED | OUTPATIENT
Start: 2024-11-13 | End: 2024-11-13

## 2024-11-13 RX ADMIN — CYANOCOBALAMIN 1000 MCG: 1000 INJECTION, SOLUTION INTRAMUSCULAR; SUBCUTANEOUS at 09:58

## 2024-11-13 NOTE — PROGRESS NOTES
Chief Complaint   Patient presents with    3 Month Follow-Up     Pt having some light headed spells       HPI: Leny Stone is a 74 y.o. female is here for follow-up of type 2 diabetes, hypertension, atrial fibrillation/sick sinus syndrome/PSVT, depression, vitamin D deficiency, chronic kidney disease, acid reflux, hyperlipidemia, hypothyroidism, history of DVT, Coats syndrome, diabetic neuropathy, iron deficiency anemia, environmental allergies, and hypertension.    Her lab work is currently pending.  Her blood sugars are averaging about 133.  Her blood pressure is well-controlled.  She denies any complaints of chest pain, chest pressure or shortness of breath.    She is anticoagulated on Coumadin due to a history of atrial fibrillation and a history of a DVT.  She also has a history of Coats  Syndrome.  She denies any complaints of abdominal pain or blood in her stools.    She has not had any difficulty with her heart racing.  She denies any complaints of chest pain or heart palpitations.  Sometimes, she gets a little lightheaded when she stands up too quickly.  She states the dizziness only occurs with position changes.  She did have an MRI of her head that was negative recently.    Her depression is stable on Lexapro.  She does have a history of chronic kidney disease.  Her renal parameters are currently pending    Her acid reflux is stable on Protonix.  She is tolerating her Crestor without difficulty.  Her thyroid function test are currently pending.  She does have a history of chronic back pain.  She takes baclofen as needed.  Her allergies are stable on Singulair.  Her neuropathy is stable on her current dose of gabapentin.  She does not take gabapentin on a regular basis.  She does have a history of iron deficiency anemia she is on ferrous sulfate.  She also has a history of sleep apnea.  She does use her CPAP machine as prescribed.    She would like a pair of diabetic shoes.    Past Medical History:

## 2024-11-13 NOTE — PROGRESS NOTES
HOME MONITORING REPORT    INR today:   Results for orders placed or performed in visit on 11/13/24   Protime-INR   Result Value Ref Range    INR 5.00        INR Goal: 2.0-3.0    Dosing Plan  As of 11/13/2024      TTR:  32.2% (6.4 y)   Full warfarin instructions:  11/13: Hold; 11/14: Hold; 11/15: 2.5 mg; Otherwise 5 mg every day           She missed a dose last night.  She states she has not had any medication changes in the past weeks. I had her check her INR twice.     PLAN: Advised patient/caregiver to hold her dose today and tomorrow. Friday take 2.5 mg then continue current dose and recheck next Tuesday.  Patient/Caregiver voiced understanding      Electronically signed by Regina Curtis MD on 11/13/2024 at 12:03 PM

## 2024-11-14 DIAGNOSIS — Z95.0 PACEMAKER: Primary | ICD-10-CM

## 2024-11-14 DIAGNOSIS — I49.5 SSS (SICK SINUS SYNDROME) (HCC): ICD-10-CM

## 2024-11-19 ENCOUNTER — ANTI-COAG VISIT (OUTPATIENT)
Dept: PRIMARY CARE CLINIC | Age: 74
End: 2024-11-19
Payer: MEDICARE

## 2024-11-19 DIAGNOSIS — Z79.01 ANTICOAGULATED ON COUMADIN: Primary | ICD-10-CM

## 2024-11-19 LAB — INR BLD: 3

## 2024-11-19 PROCEDURE — 93793 ANTICOAG MGMT PT WARFARIN: CPT | Performed by: INTERNAL MEDICINE

## 2024-11-19 NOTE — PROGRESS NOTES
HOME MONITORING REPORT    INR today:   Results for orders placed or performed in visit on 11/19/24   Protime-INR   Result Value Ref Range    INR 3.00        INR Goal: 2.0-3.0    Dosing Plan  As of 11/19/2024      TTR:  32.1% (6.5 y)   Full warfarin instructions:  11/21: 2.5 mg; 11/24: 2.5 mg; Otherwise 5 mg every day                PLAN: Advised patient/caregiver to reduce her dose on this Thursday and Sunday to 2.5 mg. 5 mg all other days. Recheck in one week.  Patient/Caregiver voiced understanding    Electronically signed by Regnia Curtis MD on 11/19/2024 at 10:30 AM      
no

## 2024-11-23 SDOH — ECONOMIC STABILITY: FOOD INSECURITY: WITHIN THE PAST 12 MONTHS, YOU WORRIED THAT YOUR FOOD WOULD RUN OUT BEFORE YOU GOT MONEY TO BUY MORE.: NEVER TRUE

## 2024-11-23 SDOH — ECONOMIC STABILITY: FOOD INSECURITY: WITHIN THE PAST 12 MONTHS, THE FOOD YOU BOUGHT JUST DIDN'T LAST AND YOU DIDN'T HAVE MONEY TO GET MORE.: NEVER TRUE

## 2024-11-23 SDOH — ECONOMIC STABILITY: INCOME INSECURITY: HOW HARD IS IT FOR YOU TO PAY FOR THE VERY BASICS LIKE FOOD, HOUSING, MEDICAL CARE, AND HEATING?: NOT HARD AT ALL

## 2024-11-23 ASSESSMENT — PATIENT HEALTH QUESTIONNAIRE - PHQ9
SUM OF ALL RESPONSES TO PHQ9 QUESTIONS 1 & 2: 2
6. FEELING BAD ABOUT YOURSELF - OR THAT YOU ARE A FAILURE OR HAVE LET YOURSELF OR YOUR FAMILY DOWN: NOT AT ALL
3. TROUBLE FALLING OR STAYING ASLEEP: NOT AT ALL
SUM OF ALL RESPONSES TO PHQ QUESTIONS 1-9: 3
8. MOVING OR SPEAKING SO SLOWLY THAT OTHER PEOPLE COULD HAVE NOTICED. OR THE OPPOSITE, BEING SO FIGETY OR RESTLESS THAT YOU HAVE BEEN MOVING AROUND A LOT MORE THAN USUAL: NOT AT ALL
2. FEELING DOWN, DEPRESSED OR HOPELESS: SEVERAL DAYS
4. FEELING TIRED OR HAVING LITTLE ENERGY: SEVERAL DAYS
SUM OF ALL RESPONSES TO PHQ QUESTIONS 1-9: 3
5. POOR APPETITE OR OVEREATING: NOT AT ALL
10. IF YOU CHECKED OFF ANY PROBLEMS, HOW DIFFICULT HAVE THESE PROBLEMS MADE IT FOR YOU TO DO YOUR WORK, TAKE CARE OF THINGS AT HOME, OR GET ALONG WITH OTHER PEOPLE: NOT DIFFICULT AT ALL
SUM OF ALL RESPONSES TO PHQ QUESTIONS 1-9: 3
9. THOUGHTS THAT YOU WOULD BE BETTER OFF DEAD, OR OF HURTING YOURSELF: NOT AT ALL
1. LITTLE INTEREST OR PLEASURE IN DOING THINGS: SEVERAL DAYS
SUM OF ALL RESPONSES TO PHQ QUESTIONS 1-9: 3
7. TROUBLE CONCENTRATING ON THINGS, SUCH AS READING THE NEWSPAPER OR WATCHING TELEVISION: NOT AT ALL

## 2024-11-23 ASSESSMENT — ENCOUNTER SYMPTOMS
STRIDOR: 0
COUGH: 0
RHINORRHEA: 0
SINUS PRESSURE: 0
NAUSEA: 0
DIARRHEA: 0
BACK PAIN: 0
EYE ITCHING: 0
VOMITING: 0
TROUBLE SWALLOWING: 0
ABDOMINAL DISTENTION: 0
APNEA: 0
SINUS PAIN: 0
SHORTNESS OF BREATH: 0
ABDOMINAL PAIN: 0
VOICE CHANGE: 0
COLOR CHANGE: 0
EYE DISCHARGE: 0
WHEEZING: 0
CONSTIPATION: 0
BLOOD IN STOOL: 0
CHEST TIGHTNESS: 0

## 2024-11-28 LAB — INR BLD: 5.3

## 2024-11-29 ENCOUNTER — TELEPHONE (OUTPATIENT)
Dept: INTERNAL MEDICINE | Age: 74
End: 2024-11-29

## 2024-11-29 NOTE — TELEPHONE ENCOUNTER
Received a call from her home INR monitoring company late Thanksgiving night.  Patient's INR was 5.5.  Unable to reach patient patient by phone.  Voicemail left to hold Coumadin until Saturday.  Repeat an INR on Saturday.    Electronically signed by Regina Curtis MD on 11/29/2024 at 6:36 AM

## 2024-12-02 ENCOUNTER — ANTI-COAG VISIT (OUTPATIENT)
Dept: PRIMARY CARE CLINIC | Age: 74
End: 2024-12-02

## 2024-12-02 DIAGNOSIS — Z79.01 ANTICOAGULATED ON COUMADIN: Primary | ICD-10-CM

## 2024-12-02 NOTE — PROGRESS NOTES
LMSitrionB X 2. Second message also asked if she would check her INR today and call in the result. Regina MCDUFFIE      HOME MONITORING REPORT    INR today:   Results for orders placed or performed in visit on 12/02/24   Protime-INR   Result Value Ref Range    INR 5.30        INR Goal: 2.0-3.0    Dosing Plan  As of 12/2/2024      TTR:  32.0% (6.5 y)   Full warfarin instructions:  5 mg every day                This is the result from 11/28/24. As of now patient has not rechecked her INR.    LMSitrionB X 2. Second message also asked if she would check her INR today and call in the result. Regina Fountain CCMA    Electronically signed by Regina Curtis MD on 12/2/2024 at 3:30 PM

## 2024-12-02 NOTE — TELEPHONE ENCOUNTER
PT notified. Voiced understanding.   Patient stated she held coumadin x 2-3 days, she was unsure. She has not rechecked her numbers. Advised patient to check level and call us back to advise further. Patient voiced understanding.

## 2024-12-03 ENCOUNTER — ANTI-COAG VISIT (OUTPATIENT)
Dept: PRIMARY CARE CLINIC | Age: 74
End: 2024-12-03

## 2024-12-03 DIAGNOSIS — Z79.01 ANTICOAGULATED ON COUMADIN: Primary | ICD-10-CM

## 2024-12-03 LAB
INR BLD: 1.3
INR BLD: 1.4

## 2024-12-03 NOTE — PROGRESS NOTES
HOME MONITORING REPORT    INR today:   Results for orders placed or performed in visit on 12/03/24   Protime-INR   Result Value Ref Range    INR 1.30    Protime-INR   Result Value Ref Range    INR 1.40        INR Goal: 2.0-3.0    Dosing Plan  As of 12/3/2024      TTR:  32.0% (6.5 y)   Full warfarin instructions:  12/3: 10 mg; Otherwise 5 mg every day            She has not taken any coumadin since last Thursday. She did not retest as directed by MD.     PLAN: Advised patient/caregiver to increase her dose tonight to 10 mg, then continue current dose and recheck on Thursday.  Patient/Caregiver voiced understanding    Electronically signed by Regina Curtis MD on 12/3/2024 at 10:04 AM

## 2024-12-05 ENCOUNTER — ANTI-COAG VISIT (OUTPATIENT)
Dept: PRIMARY CARE CLINIC | Age: 74
End: 2024-12-05

## 2024-12-05 DIAGNOSIS — Z79.01 ANTICOAGULATED ON COUMADIN: Primary | ICD-10-CM

## 2024-12-05 LAB — INR BLD: 2.7

## 2024-12-05 NOTE — PROGRESS NOTES
HOME MONITORING REPORT    INR today:   Results for orders placed or performed in visit on 12/05/24   Protime-INR   Result Value Ref Range    INR 2.70        INR Goal: 2.0-3.0    Dosing Plan  As of 12/5/2024      TTR:  32.0% (6.5 y)   Full warfarin instructions:  12/7: 2.5 mg; 12/9: 2.5 mg; Otherwise 5 mg every day                PLAN: Advised patient/caregiver to reduce her dose this week to 2.5 mg on Saturday and Monday. 5 mg all other days. Recheck in one week.  Patient/Caregiver voiced understanding    Electronically signed by Regina Curtis MD on 12/5/2024 at 2:30 PM

## 2024-12-08 DIAGNOSIS — I48.0 PAROXYSMAL ATRIAL FIBRILLATION (HCC): ICD-10-CM

## 2024-12-08 DIAGNOSIS — I82.4Y9 DVT, LOWER EXTREMITY, PROXIMAL, ACUTE, UNSPECIFIED LATERALITY (HCC): ICD-10-CM

## 2024-12-08 DIAGNOSIS — J98.4 RESTRICTIVE AIRWAY DISEASE: ICD-10-CM

## 2024-12-08 DIAGNOSIS — M17.10 ARTHRITIS OF KNEE: ICD-10-CM

## 2024-12-09 RX ORDER — WARFARIN SODIUM 5 MG/1
TABLET ORAL
Qty: 90 TABLET | Refills: 3 | OUTPATIENT
Start: 2024-12-09

## 2024-12-09 RX ORDER — MONTELUKAST SODIUM 10 MG/1
10 TABLET ORAL NIGHTLY
Qty: 90 TABLET | Refills: 3 | OUTPATIENT
Start: 2024-12-09

## 2024-12-09 RX ORDER — BACLOFEN 10 MG/1
10 TABLET ORAL 3 TIMES DAILY
Qty: 270 TABLET | Refills: 3 | OUTPATIENT
Start: 2024-12-09

## 2024-12-12 ENCOUNTER — ANTI-COAG VISIT (OUTPATIENT)
Dept: PRIMARY CARE CLINIC | Age: 74
End: 2024-12-12

## 2024-12-12 DIAGNOSIS — Z79.01 ANTICOAGULATED ON COUMADIN: Primary | ICD-10-CM

## 2024-12-12 LAB — INR BLD: 3

## 2024-12-12 NOTE — PROGRESS NOTES
HOME MONITORING REPORT    INR today:   Results for orders placed or performed in visit on 12/12/24   Protime-INR   Result Value Ref Range    INR 3.00        INR Goal: 2.0-3.0    Dosing Plan  As of 12/12/2024      TTR:  32.2% (6.5 y)   Full warfarin instructions:  12/14: 2.5 mg; Otherwise 5 mg every day                PLAN: Advised patient/caregiver to reduce her dose on Saturday and Monday to 2.5 mg. 5 mg all other days. Recheck in one week.  Patient/Caregiver voiced understanding      Electronically signed by Regina Curtis MD on 12/12/2024 at 2:23 PM

## 2024-12-17 ENCOUNTER — ANTI-COAG VISIT (OUTPATIENT)
Dept: PRIMARY CARE CLINIC | Age: 74
End: 2024-12-17

## 2024-12-17 DIAGNOSIS — Z79.01 ANTICOAGULATED ON COUMADIN: Primary | ICD-10-CM

## 2024-12-17 LAB — INR BLD: 2.7

## 2024-12-17 NOTE — PROGRESS NOTES
HOME MONITORING REPORT    INR today:   Results for orders placed or performed in visit on 12/17/24   Protime-INR   Result Value Ref Range    INR 2.70        INR Goal: 2.0-3.0    Dosing Plan  As of 12/17/2024      TTR:  32.4% (6.5 y)   Full warfarin instructions:  2.5 mg every Tue, Sat; 5 mg all other days                PLAN: Advised patient/caregiver to continue current dose and recheck in one week.  Patient/Caregiver voiced understanding    Electronically signed by Regina Curtis MD on 12/17/2024 at 11:15 AM

## 2024-12-30 ENCOUNTER — PATIENT MESSAGE (OUTPATIENT)
Dept: INTERNAL MEDICINE | Age: 74
End: 2024-12-30

## 2024-12-30 DIAGNOSIS — F32.A ANXIETY AND DEPRESSION: ICD-10-CM

## 2024-12-30 DIAGNOSIS — I82.4Y9 DVT, LOWER EXTREMITY, PROXIMAL, ACUTE, UNSPECIFIED LATERALITY (HCC): ICD-10-CM

## 2024-12-30 DIAGNOSIS — I48.0 PAROXYSMAL ATRIAL FIBRILLATION (HCC): ICD-10-CM

## 2024-12-30 DIAGNOSIS — F41.9 ANXIETY AND DEPRESSION: ICD-10-CM

## 2024-12-30 DIAGNOSIS — M17.10 ARTHRITIS OF KNEE: ICD-10-CM

## 2024-12-30 DIAGNOSIS — J98.4 RESTRICTIVE AIRWAY DISEASE: ICD-10-CM

## 2024-12-30 RX ORDER — AMLODIPINE BESYLATE 5 MG/1
5 TABLET ORAL DAILY
Qty: 90 TABLET | Refills: 1 | Status: SHIPPED | OUTPATIENT
Start: 2024-12-30

## 2024-12-30 RX ORDER — ESCITALOPRAM OXALATE 20 MG/1
20 TABLET ORAL DAILY
Qty: 90 TABLET | Refills: 0 | Status: SHIPPED | OUTPATIENT
Start: 2024-12-30

## 2024-12-30 RX ORDER — AMIODARONE HYDROCHLORIDE 200 MG/1
100 TABLET ORAL DAILY
Qty: 45 TABLET | Refills: 1 | Status: SHIPPED | OUTPATIENT
Start: 2024-12-30

## 2024-12-30 NOTE — TELEPHONE ENCOUNTER
Leny Stone called to request a refill on her medication.      Last office visit : 11/13/2024   Next office visit : 2/12/2025     Requested Prescriptions     Pending Prescriptions Disp Refills    escitalopram (LEXAPRO) 20 MG tablet 90 tablet 0     Sig: Take 1 tablet by mouth daily            April Natalie Sahu MA

## 2024-12-31 DIAGNOSIS — K21.9 GASTROESOPHAGEAL REFLUX DISEASE WITHOUT ESOPHAGITIS: ICD-10-CM

## 2024-12-31 DIAGNOSIS — K52.9 GASTROENTERITIS: ICD-10-CM

## 2024-12-31 RX ORDER — MONTELUKAST SODIUM 10 MG/1
10 TABLET ORAL NIGHTLY
Qty: 90 TABLET | Refills: 1 | Status: SHIPPED | OUTPATIENT
Start: 2024-12-31

## 2024-12-31 RX ORDER — BACLOFEN 10 MG/1
10 TABLET ORAL 3 TIMES DAILY
Qty: 270 TABLET | Refills: 1 | Status: SHIPPED | OUTPATIENT
Start: 2024-12-31 | End: 2024-12-31

## 2024-12-31 RX ORDER — WARFARIN SODIUM 5 MG/1
TABLET ORAL
Qty: 90 TABLET | Refills: 1 | Status: SHIPPED | OUTPATIENT
Start: 2024-12-31

## 2024-12-31 RX ORDER — WARFARIN SODIUM 5 MG/1
TABLET ORAL
Qty: 90 TABLET | Refills: 1 | Status: SHIPPED | OUTPATIENT
Start: 2024-12-31 | End: 2024-12-31

## 2024-12-31 RX ORDER — BACLOFEN 10 MG/1
10 TABLET ORAL 3 TIMES DAILY
Qty: 270 TABLET | Refills: 1 | Status: SHIPPED | OUTPATIENT
Start: 2024-12-31

## 2024-12-31 RX ORDER — MONTELUKAST SODIUM 10 MG/1
10 TABLET ORAL NIGHTLY
Qty: 90 TABLET | Refills: 1 | Status: SHIPPED | OUTPATIENT
Start: 2024-12-31 | End: 2024-12-31

## 2024-12-31 RX ORDER — SUCRALFATE 1 G/1
TABLET ORAL
Qty: 360 TABLET | Refills: 3 | Status: SHIPPED | OUTPATIENT
Start: 2024-12-31

## 2024-12-31 NOTE — TELEPHONE ENCOUNTER
Leny Stone called to request a refill on her medication.      Last office visit : Visit date not found   Next office visit : Visit date not found     Requested Prescriptions     Pending Prescriptions Disp Refills    sucralfate (CARAFATE) 1 GM tablet [Pharmacy Med Name: Sucralfate Oral Tablet 1 GM] 360 tablet 3     Sig: TAKE 1 TABLET FOUR TIMES DAILY            Alyson Delarosa

## 2025-01-02 ENCOUNTER — ANTI-COAG VISIT (OUTPATIENT)
Dept: PRIMARY CARE CLINIC | Age: 75
End: 2025-01-02
Payer: MEDICARE

## 2025-01-02 DIAGNOSIS — Z79.01 ANTICOAGULATED ON COUMADIN: Primary | ICD-10-CM

## 2025-01-02 LAB — INR BLD: 2.8

## 2025-01-02 PROCEDURE — 93793 ANTICOAG MGMT PT WARFARIN: CPT | Performed by: INTERNAL MEDICINE

## 2025-01-02 NOTE — PROGRESS NOTES
HOME MONITORING REPORT    INR today:   Results for orders placed or performed in visit on 01/02/25   Protime-INR   Result Value Ref Range    INR 2.80        INR Goal: 2.0-3.0    Dosing Plan  As of 1/2/2025      TTR:  32.8% (6.6 y)   Full warfarin instructions:  2.5 mg every Tue, Sat; 5 mg all other days                PLAN: Patient aware to continue current dose and recheck in one week or as ordered.     Electronically signed by Regina Curtis MD on 1/2/2025 at 2:53 PM

## 2025-01-09 ENCOUNTER — ANTI-COAG VISIT (OUTPATIENT)
Dept: PRIMARY CARE CLINIC | Age: 75
End: 2025-01-09

## 2025-01-09 DIAGNOSIS — Z79.01 ANTICOAGULATED ON COUMADIN: Primary | ICD-10-CM

## 2025-01-09 LAB — INR BLD: 1.6

## 2025-01-17 LAB
INR BLD: 1.6
INR BLD: 1.7

## 2025-01-20 ENCOUNTER — ANTI-COAG VISIT (OUTPATIENT)
Dept: PRIMARY CARE CLINIC | Age: 75
End: 2025-01-20
Payer: MEDICARE

## 2025-01-20 DIAGNOSIS — Z79.01 ANTICOAGULATED ON COUMADIN: Primary | ICD-10-CM

## 2025-01-20 PROCEDURE — 93793 ANTICOAG MGMT PT WARFARIN: CPT | Performed by: INTERNAL MEDICINE

## 2025-01-20 NOTE — PROGRESS NOTES
HOME MONITORING REPORT    INR today:   Results for orders placed or performed in visit on 01/20/25   Protime-INR   Result Value Ref Range    INR 1.60    Protime-INR   Result Value Ref Range    INR 1.70        INR Goal: 2.0-3.0    Dosing Plan  As of 1/20/2025      TTR:  32.8% (6.6 y)   Full warfarin instructions:  1/20: 7.5 mg; Otherwise 2.5 mg every Tue, Sat; 5 mg all other days            Missed doses again.     PLAN: Advised patient/caregiver to increase her dose tonight o 7.5 mg, then continue current dose and recheck in one week. Do not miss any more doses of medication.  Patient/Caregiver voiced understanding      Electronically signed by Regina Curtis MD on 1/20/2025 at 9:57 AM

## 2025-01-23 ENCOUNTER — ANTI-COAG VISIT (OUTPATIENT)
Dept: PRIMARY CARE CLINIC | Age: 75
End: 2025-01-23

## 2025-01-23 DIAGNOSIS — Z79.01 ANTICOAGULATED ON COUMADIN: Primary | ICD-10-CM

## 2025-01-23 LAB — INR BLD: 2.7

## 2025-01-23 NOTE — PROGRESS NOTES
HOME MONITORING REPORT    INR today:   Results for orders placed or performed in visit on 01/23/25   Protime-INR   Result Value Ref Range    INR 2.70        INR Goal: 2.0-3.0    Dosing Plan  As of 1/23/2025      TTR:  32.9% (6.6 y)   Full warfarin instructions:  2.5 mg every Tue, Sat; 5 mg all other days                PLAN: Advised patient/caregiver to continue current dose and recheck in one week.  Patient/Caregiver voiced understanding      Electronically signed by Regina Curtis MD on 1/23/2025 at 1:46 PM

## 2025-01-30 ENCOUNTER — ANTI-COAG VISIT (OUTPATIENT)
Dept: PRIMARY CARE CLINIC | Age: 75
End: 2025-01-30
Payer: MEDICARE

## 2025-01-30 DIAGNOSIS — Z79.01 ANTICOAGULATED ON COUMADIN: Primary | ICD-10-CM

## 2025-01-30 LAB — INR BLD: 5.9

## 2025-01-30 PROCEDURE — 93793 ANTICOAG MGMT PT WARFARIN: CPT | Performed by: INTERNAL MEDICINE

## 2025-01-30 NOTE — PROGRESS NOTES
HOME MONITORING REPORT    INR today:   Results for orders placed or performed in visit on 01/30/25   Protime-INR   Result Value Ref Range    INR 5.90        INR Goal: 2.0-3.0    Dosing Plan  As of 1/30/2025      TTR:  32.8% (6.7 y)   Full warfarin instructions:  1/30: Hold; 1/31: Hold; 2/2: 2.5 mg; 2/3: 5 mg; Otherwise 2.5 mg every Tue, Sat; 5 mg all other days            I have called patient six times today and left messages for urgent call back. She has not returned my calls. I will attempt to call her again on Friday.     PLAN: Advise patient/caregiver to hold her dose today and tomorrow. Take 2.5 mg Saturday and Sunday. Recheck on Monday.  Patient/Caregiver voiced understanding    Electronically signed by Regina Curtis MD on 1/30/2025 at 4:30 PM

## 2025-02-06 ENCOUNTER — ANTI-COAG VISIT (OUTPATIENT)
Dept: PRIMARY CARE CLINIC | Age: 75
End: 2025-02-06
Payer: MEDICARE

## 2025-02-06 DIAGNOSIS — Z79.01 ANTICOAGULATED ON COUMADIN: Primary | ICD-10-CM

## 2025-02-06 LAB — INR BLD: 3.4

## 2025-02-06 PROCEDURE — 93793 ANTICOAG MGMT PT WARFARIN: CPT | Performed by: INTERNAL MEDICINE

## 2025-02-06 NOTE — PROGRESS NOTES
HOME MONITORING REPORT    INR today:   Results for orders placed or performed in visit on 02/06/25   Protime-INR   Result Value Ref Range    INR 3.40        INR Goal: 2.0-3.0    Dosing Plan  As of 2/6/2025      TTR:  32.7% (6.7 y)   Full warfarin instructions:  2/6: Hold; Otherwise 2.5 mg every Tue, Sat; 5 mg all other days              Patient has been taking her dose incorrectly. She has been taking 5 mg a day. She is supposed to be taking 2.5 mg on Tuesday and Saturday, 5 mg all other days.   PLAN: Advised patient/caregiver to hold her dose tonight, then continue correct dose and recheck in one week.  Patient/Caregiver voiced understanding      Electronically signed by Regina Curtis MD on 2/6/2025 at 4:14 PM

## 2025-02-07 DIAGNOSIS — E03.9 HYPOTHYROIDISM, UNSPECIFIED TYPE: ICD-10-CM

## 2025-02-07 DIAGNOSIS — E11.21 TYPE II DIABETES MELLITUS WITH NEPHROPATHY (HCC): ICD-10-CM

## 2025-02-07 DIAGNOSIS — D50.9 IRON DEFICIENCY ANEMIA, UNSPECIFIED IRON DEFICIENCY ANEMIA TYPE: ICD-10-CM

## 2025-02-07 DIAGNOSIS — E55.9 VITAMIN D DEFICIENCY: ICD-10-CM

## 2025-02-07 LAB
25(OH)D3 SERPL-MCNC: 49.4 NG/ML
ALBUMIN SERPL-MCNC: 4.2 G/DL (ref 3.5–5.2)
ALP SERPL-CCNC: 63 U/L (ref 35–104)
ALT SERPL-CCNC: 12 U/L (ref 5–33)
ANION GAP SERPL CALCULATED.3IONS-SCNC: 17 MMOL/L (ref 8–16)
AST SERPL-CCNC: 23 U/L (ref 5–32)
BASOPHILS # BLD: 0 K/UL (ref 0–0.2)
BASOPHILS NFR BLD: 0.4 % (ref 0–1)
BILIRUB SERPL-MCNC: 0.3 MG/DL (ref 0.2–1.2)
BUN SERPL-MCNC: 42 MG/DL (ref 8–23)
CALCIUM SERPL-MCNC: 9.1 MG/DL (ref 8.8–10.2)
CHLORIDE SERPL-SCNC: 101 MMOL/L (ref 98–107)
CHOLEST SERPL-MCNC: 193 MG/DL (ref 0–199)
CO2 SERPL-SCNC: 20 MMOL/L (ref 22–29)
CREAT SERPL-MCNC: 2.9 MG/DL (ref 0.5–0.9)
CREAT UR-MCNC: 221.3 MG/DL (ref 28–217)
EOSINOPHIL # BLD: 0.1 K/UL (ref 0–0.6)
EOSINOPHIL NFR BLD: 2 % (ref 0–5)
ERYTHROCYTE [DISTWIDTH] IN BLOOD BY AUTOMATED COUNT: 16.3 % (ref 11.5–14.5)
FERRITIN SERPL-MCNC: 247.4 NG/ML (ref 13–150)
GLUCOSE SERPL-MCNC: 79 MG/DL (ref 70–99)
HBA1C MFR BLD: 5.8 % (ref 4–5.6)
HCT VFR BLD AUTO: 36.3 % (ref 37–47)
HDLC SERPL-MCNC: 98 MG/DL (ref 40–60)
HGB BLD-MCNC: 11.2 G/DL (ref 12–16)
IMM GRANULOCYTES # BLD: 0.1 K/UL
IRON SATN MFR SERPL: 12 % (ref 15–50)
IRON SERPL-MCNC: 38 UG/DL (ref 37–145)
LDLC SERPL CALC-MCNC: 79 MG/DL
LYMPHOCYTES # BLD: 2.7 K/UL (ref 1.1–4.5)
LYMPHOCYTES NFR BLD: 38.9 % (ref 20–40)
MCH RBC QN AUTO: 29.8 PG (ref 27–31)
MCHC RBC AUTO-ENTMCNC: 30.9 G/DL (ref 33–37)
MCV RBC AUTO: 96.5 FL (ref 81–99)
MICROALBUMIN UR-MCNC: <1.2 MG/DL (ref 0–1.99)
MICROALBUMIN/CREAT UR-RTO: ABNORMAL MG/G (ref 0–29)
MONOCYTES # BLD: 0.9 K/UL (ref 0–0.9)
MONOCYTES NFR BLD: 12.4 % (ref 0–10)
NEUTROPHILS # BLD: 3.1 K/UL (ref 1.5–7.5)
NEUTS SEG NFR BLD: 45.6 % (ref 50–65)
PLATELET # BLD AUTO: 163 K/UL (ref 130–400)
PMV BLD AUTO: 12.8 FL (ref 9.4–12.3)
POTASSIUM SERPL-SCNC: 4.7 MMOL/L (ref 3.5–5.1)
PROT SERPL-MCNC: 7.9 G/DL (ref 6.4–8.3)
RBC # BLD AUTO: 3.76 M/UL (ref 4.2–5.4)
SODIUM SERPL-SCNC: 138 MMOL/L (ref 136–145)
T4 FREE SERPL-MCNC: 1.43 NG/DL (ref 0.93–1.7)
TIBC SERPL-MCNC: 319 UG/DL (ref 250–400)
TRIGL SERPL-MCNC: 78 MG/DL (ref 0–149)
TSH SERPL DL<=0.005 MIU/L-ACNC: 8.51 UIU/ML (ref 0.27–4.2)
WBC # BLD AUTO: 6.9 K/UL (ref 4.8–10.8)

## 2025-02-11 DIAGNOSIS — I47.10 PAROXYSMAL SVT (SUPRAVENTRICULAR TACHYCARDIA) (HCC): ICD-10-CM

## 2025-02-11 RX ORDER — SODIUM BICARBONATE 650 MG/1
650 TABLET ORAL 2 TIMES DAILY
Qty: 180 TABLET | Refills: 1 | Status: SHIPPED | OUTPATIENT
Start: 2025-02-11

## 2025-02-11 SDOH — ECONOMIC STABILITY: INCOME INSECURITY: IN THE LAST 12 MONTHS, WAS THERE A TIME WHEN YOU WERE NOT ABLE TO PAY THE MORTGAGE OR RENT ON TIME?: YES

## 2025-02-11 SDOH — ECONOMIC STABILITY: FOOD INSECURITY: WITHIN THE PAST 12 MONTHS, THE FOOD YOU BOUGHT JUST DIDN'T LAST AND YOU DIDN'T HAVE MONEY TO GET MORE.: SOMETIMES TRUE

## 2025-02-11 ASSESSMENT — PATIENT HEALTH QUESTIONNAIRE - PHQ9
9. THOUGHTS THAT YOU WOULD BE BETTER OFF DEAD, OR OF HURTING YOURSELF: SEVERAL DAYS
SUM OF ALL RESPONSES TO PHQ9 QUESTIONS 1 & 2: 6
SUM OF ALL RESPONSES TO PHQ QUESTIONS 1-9: 25
6. FEELING BAD ABOUT YOURSELF - OR THAT YOU ARE A FAILURE OR HAVE LET YOURSELF OR YOUR FAMILY DOWN: NEARLY EVERY DAY
8. MOVING OR SPEAKING SO SLOWLY THAT OTHER PEOPLE COULD HAVE NOTICED. OR THE OPPOSITE - BEING SO FIDGETY OR RESTLESS THAT YOU HAVE BEEN MOVING AROUND A LOT MORE THAN USUAL: NEARLY EVERY DAY
9. THOUGHTS THAT YOU WOULD BE BETTER OFF DEAD, OR OF HURTING YOURSELF: SEVERAL DAYS
1. LITTLE INTEREST OR PLEASURE IN DOING THINGS: NEARLY EVERY DAY
10. IF YOU CHECKED OFF ANY PROBLEMS, HOW DIFFICULT HAVE THESE PROBLEMS MADE IT FOR YOU TO DO YOUR WORK, TAKE CARE OF THINGS AT HOME, OR GET ALONG WITH OTHER PEOPLE: SOMEWHAT DIFFICULT
1. LITTLE INTEREST OR PLEASURE IN DOING THINGS: NEARLY EVERY DAY
10. IF YOU CHECKED OFF ANY PROBLEMS, HOW DIFFICULT HAVE THESE PROBLEMS MADE IT FOR YOU TO DO YOUR WORK, TAKE CARE OF THINGS AT HOME, OR GET ALONG WITH OTHER PEOPLE: SOMEWHAT DIFFICULT
4. FEELING TIRED OR HAVING LITTLE ENERGY: NEARLY EVERY DAY
7. TROUBLE CONCENTRATING ON THINGS, SUCH AS READING THE NEWSPAPER OR WATCHING TELEVISION: NEARLY EVERY DAY
2. FEELING DOWN, DEPRESSED OR HOPELESS: NEARLY EVERY DAY
3. TROUBLE FALLING OR STAYING ASLEEP: NEARLY EVERY DAY
5. POOR APPETITE OR OVEREATING: NEARLY EVERY DAY
7. TROUBLE CONCENTRATING ON THINGS, SUCH AS READING THE NEWSPAPER OR WATCHING TELEVISION: NEARLY EVERY DAY
SUM OF ALL RESPONSES TO PHQ QUESTIONS 1-9: 24
SUM OF ALL RESPONSES TO PHQ QUESTIONS 1-9: 25
6. FEELING BAD ABOUT YOURSELF - OR THAT YOU ARE A FAILURE OR HAVE LET YOURSELF OR YOUR FAMILY DOWN: NEARLY EVERY DAY
SUM OF ALL RESPONSES TO PHQ QUESTIONS 1-9: 25
8. MOVING OR SPEAKING SO SLOWLY THAT OTHER PEOPLE COULD HAVE NOTICED. OR THE OPPOSITE, BEING SO FIGETY OR RESTLESS THAT YOU HAVE BEEN MOVING AROUND A LOT MORE THAN USUAL: NEARLY EVERY DAY
4. FEELING TIRED OR HAVING LITTLE ENERGY: NEARLY EVERY DAY
3. TROUBLE FALLING OR STAYING ASLEEP: NEARLY EVERY DAY
SUM OF ALL RESPONSES TO PHQ QUESTIONS 1-9: 25
5. POOR APPETITE OR OVEREATING: NEARLY EVERY DAY
2. FEELING DOWN, DEPRESSED OR HOPELESS: NEARLY EVERY DAY

## 2025-02-11 ASSESSMENT — COLUMBIA-SUICIDE SEVERITY RATING SCALE - C-SSRS
6. IN YOUR LIFETIME, HAVE YOU EVER DONE ANYTHING, STARTED TO DO ANYTHING, OR PREPARED TO DO ANYTHING TO END YOUR LIFE?: NO
1. IN THE PAST MONTH, HAVE YOU WISHED YOU WERE DEAD OR WISHED YOU COULD GO TO SLEEP AND NOT WAKE UP?: YES
2. IN THE PAST MONTH, HAVE YOU ACTUALLY HAD ANY THOUGHTS OF KILLING YOURSELF?: NO

## 2025-02-12 ENCOUNTER — OFFICE VISIT (OUTPATIENT)
Dept: INTERNAL MEDICINE | Age: 75
End: 2025-02-12

## 2025-02-12 ENCOUNTER — TELEPHONE (OUTPATIENT)
Dept: INTERNAL MEDICINE | Age: 75
End: 2025-02-12

## 2025-02-12 ENCOUNTER — ANTI-COAG VISIT (OUTPATIENT)
Dept: PRIMARY CARE CLINIC | Age: 75
End: 2025-02-12
Payer: MEDICARE

## 2025-02-12 VITALS
HEART RATE: 85 BPM | WEIGHT: 241 LBS | BODY MASS INDEX: 37.75 KG/M2 | TEMPERATURE: 98.3 F | DIASTOLIC BLOOD PRESSURE: 78 MMHG | SYSTOLIC BLOOD PRESSURE: 128 MMHG | OXYGEN SATURATION: 98 %

## 2025-02-12 DIAGNOSIS — I82.5Y9 CHRONIC DEEP VEIN THROMBOSIS (DVT) OF PROXIMAL VEIN OF LOWER EXTREMITY, UNSPECIFIED LATERALITY (HCC): ICD-10-CM

## 2025-02-12 DIAGNOSIS — R06.02 SHORTNESS OF BREATH: ICD-10-CM

## 2025-02-12 DIAGNOSIS — I20.89 STABLE ANGINA (HCC): ICD-10-CM

## 2025-02-12 DIAGNOSIS — D50.9 IRON DEFICIENCY ANEMIA, UNSPECIFIED IRON DEFICIENCY ANEMIA TYPE: ICD-10-CM

## 2025-02-12 DIAGNOSIS — I47.10 PAROXYSMAL SVT (SUPRAVENTRICULAR TACHYCARDIA) (HCC): ICD-10-CM

## 2025-02-12 DIAGNOSIS — D68.62 LUPUS ANTICOAGULANT DISORDER (HCC): ICD-10-CM

## 2025-02-12 DIAGNOSIS — N18.9 ACUTE KIDNEY INJURY SUPERIMPOSED ON CHRONIC KIDNEY DISEASE (HCC): ICD-10-CM

## 2025-02-12 DIAGNOSIS — G47.30 SLEEP APNEA, UNSPECIFIED TYPE: ICD-10-CM

## 2025-02-12 DIAGNOSIS — I49.5 SSS (SICK SINUS SYNDROME) (HCC): ICD-10-CM

## 2025-02-12 DIAGNOSIS — L98.492 SKIN ULCER OF UPPER ARM, WITH FAT LAYER EXPOSED (HCC): ICD-10-CM

## 2025-02-12 DIAGNOSIS — F32.89 OTHER DEPRESSION: ICD-10-CM

## 2025-02-12 DIAGNOSIS — E03.9 HYPOTHYROIDISM, UNSPECIFIED TYPE: ICD-10-CM

## 2025-02-12 DIAGNOSIS — K31.84 GASTROPARESIS: ICD-10-CM

## 2025-02-12 DIAGNOSIS — N39.0 URINARY TRACT INFECTION WITHOUT HEMATURIA, SITE UNSPECIFIED: Primary | ICD-10-CM

## 2025-02-12 DIAGNOSIS — R11.2 NAUSEA AND VOMITING, UNSPECIFIED VOMITING TYPE: ICD-10-CM

## 2025-02-12 DIAGNOSIS — R07.89 CHEST PRESSURE: ICD-10-CM

## 2025-02-12 DIAGNOSIS — J44.9 CHRONIC OBSTRUCTIVE PULMONARY DISEASE, UNSPECIFIED COPD TYPE (HCC): ICD-10-CM

## 2025-02-12 DIAGNOSIS — N18.31 STAGE 3A CHRONIC KIDNEY DISEASE (HCC): ICD-10-CM

## 2025-02-12 DIAGNOSIS — I48.91 ATRIAL FIBRILLATION WITH RAPID VENTRICULAR RESPONSE (HCC): ICD-10-CM

## 2025-02-12 DIAGNOSIS — D69.6 THROMBOCYTOPENIA (HCC): ICD-10-CM

## 2025-02-12 DIAGNOSIS — I48.0 PAROXYSMAL ATRIAL FIBRILLATION (HCC): ICD-10-CM

## 2025-02-12 DIAGNOSIS — R00.2 PALPITATIONS: ICD-10-CM

## 2025-02-12 DIAGNOSIS — F33.2 SEVERE EPISODE OF RECURRENT MAJOR DEPRESSIVE DISORDER, WITHOUT PSYCHOTIC FEATURES (HCC): ICD-10-CM

## 2025-02-12 DIAGNOSIS — R30.0 DYSURIA: ICD-10-CM

## 2025-02-12 DIAGNOSIS — E11.49 OTHER DIABETIC NEUROLOGICAL COMPLICATION ASSOCIATED WITH TYPE 2 DIABETES MELLITUS (HCC): ICD-10-CM

## 2025-02-12 DIAGNOSIS — E43 UNSPECIFIED SEVERE PROTEIN-CALORIE MALNUTRITION (HCC): ICD-10-CM

## 2025-02-12 DIAGNOSIS — K29.70 GASTRITIS WITHOUT BLEEDING, UNSPECIFIED CHRONICITY, UNSPECIFIED GASTRITIS TYPE: ICD-10-CM

## 2025-02-12 DIAGNOSIS — M54.50 CHRONIC LOW BACK PAIN, UNSPECIFIED BACK PAIN LATERALITY, UNSPECIFIED WHETHER SCIATICA PRESENT: ICD-10-CM

## 2025-02-12 DIAGNOSIS — N18.32 STAGE 3B CHRONIC KIDNEY DISEASE (HCC): ICD-10-CM

## 2025-02-12 DIAGNOSIS — N17.9 AKI (ACUTE KIDNEY INJURY) (HCC): ICD-10-CM

## 2025-02-12 DIAGNOSIS — E11.21 TYPE II DIABETES MELLITUS WITH NEPHROPATHY (HCC): Primary | ICD-10-CM

## 2025-02-12 DIAGNOSIS — E53.8 B12 DEFICIENCY: ICD-10-CM

## 2025-02-12 DIAGNOSIS — J45.20 MILD INTERMITTENT REACTIVE AIRWAY DISEASE WITHOUT COMPLICATION: ICD-10-CM

## 2025-02-12 DIAGNOSIS — M32.9 H/O SYSTEMIC LUPUS ERYTHEMATOSUS (SLE) (HCC): ICD-10-CM

## 2025-02-12 DIAGNOSIS — R60.0 BILATERAL LOWER EXTREMITY EDEMA: ICD-10-CM

## 2025-02-12 DIAGNOSIS — Z98.84 HISTORY OF ROUX-EN-Y GASTRIC BYPASS: ICD-10-CM

## 2025-02-12 DIAGNOSIS — I10 PRIMARY HYPERTENSION: ICD-10-CM

## 2025-02-12 DIAGNOSIS — Z91.09 ENVIRONMENTAL ALLERGIES: ICD-10-CM

## 2025-02-12 DIAGNOSIS — N17.9 ACUTE KIDNEY INJURY SUPERIMPOSED ON CHRONIC KIDNEY DISEASE (HCC): ICD-10-CM

## 2025-02-12 DIAGNOSIS — R11.10 RECURRENT VOMITING: ICD-10-CM

## 2025-02-12 DIAGNOSIS — H35.029: ICD-10-CM

## 2025-02-12 DIAGNOSIS — I82.4Y9 DVT, LOWER EXTREMITY, PROXIMAL, ACUTE, UNSPECIFIED LATERALITY (HCC): ICD-10-CM

## 2025-02-12 DIAGNOSIS — E55.9 VITAMIN D DEFICIENCY: ICD-10-CM

## 2025-02-12 DIAGNOSIS — E11.69 TYPE 2 DIABETES MELLITUS WITH OTHER SPECIFIED COMPLICATION, UNSPECIFIED WHETHER LONG TERM INSULIN USE (HCC): ICD-10-CM

## 2025-02-12 DIAGNOSIS — G89.29 CHRONIC LOW BACK PAIN, UNSPECIFIED BACK PAIN LATERALITY, UNSPECIFIED WHETHER SCIATICA PRESENT: ICD-10-CM

## 2025-02-12 DIAGNOSIS — E66.01 MORBID OBESITY DUE TO EXCESS CALORIES: ICD-10-CM

## 2025-02-12 DIAGNOSIS — Z79.01 ANTICOAGULATED ON COUMADIN: Primary | ICD-10-CM

## 2025-02-12 LAB
BACTERIA #/AREA URNS HPF: ABNORMAL /HPF
BILIRUB UR QL STRIP: NEGATIVE
BNP BLD-MCNC: 1423 PG/ML (ref 0–449)
CLARITY UR: ABNORMAL
COLOR UR: YELLOW
GLUCOSE UR STRIP.AUTO-MCNC: NEGATIVE MG/DL
HGB UR STRIP.AUTO-MCNC: NEGATIVE MG/L
HYALINE CASTS #/AREA URNS LPF: ABNORMAL /LPF (ref 0–5)
INR BLD: 2.18
INR PPP: 2.18 (ref 0.88–1.18)
KETONES UR STRIP.AUTO-MCNC: ABNORMAL MG/DL
LEUKOCYTE ESTERASE UR QL STRIP.AUTO: ABNORMAL
NITRITE UR QL STRIP.AUTO: NEGATIVE
PH UR STRIP.AUTO: 5.5 [PH] (ref 5–8)
PROT UR STRIP.AUTO-MCNC: ABNORMAL MG/DL
PROTHROMBIN TIME: 23.7 SEC (ref 12–14.6)
RBC #/AREA URNS HPF: ABNORMAL /HPF (ref 0–2)
SP GR UR STRIP.AUTO: 1.02 (ref 1–1.03)
SQUAMOUS #/AREA URNS HPF: ABNORMAL /HPF
UROBILINOGEN UR STRIP.AUTO-MCNC: 1 E.U./DL
WBC #/AREA URNS HPF: ABNORMAL /HPF (ref 0–5)

## 2025-02-12 PROCEDURE — 93793 ANTICOAG MGMT PT WARFARIN: CPT | Performed by: INTERNAL MEDICINE

## 2025-02-12 RX ORDER — CYANOCOBALAMIN 1000 UG/ML
1000 INJECTION, SOLUTION INTRAMUSCULAR; SUBCUTANEOUS ONCE
Status: COMPLETED | OUTPATIENT
Start: 2025-02-12 | End: 2025-02-12

## 2025-02-12 RX ORDER — LEVOTHYROXINE SODIUM 100 UG/1
100 TABLET ORAL DAILY
Qty: 30 TABLET | Refills: 1 | Status: SHIPPED | OUTPATIENT
Start: 2025-02-12

## 2025-02-12 RX ORDER — CEFDINIR 300 MG/1
300 CAPSULE ORAL 2 TIMES DAILY
Qty: 20 CAPSULE | Refills: 0 | Status: SHIPPED | OUTPATIENT
Start: 2025-02-12 | End: 2025-02-22

## 2025-02-12 RX ORDER — ALBUTEROL SULFATE 90 UG/1
2 INHALANT RESPIRATORY (INHALATION) EVERY 6 HOURS PRN
Qty: 18 G | Refills: 3 | Status: SHIPPED | OUTPATIENT
Start: 2025-02-12 | End: 2025-02-14

## 2025-02-12 RX ADMIN — CYANOCOBALAMIN 1000 MCG: 1000 INJECTION, SOLUTION INTRAMUSCULAR; SUBCUTANEOUS at 10:43

## 2025-02-12 SDOH — ECONOMIC STABILITY: FOOD INSECURITY: WITHIN THE PAST 12 MONTHS, YOU WORRIED THAT YOUR FOOD WOULD RUN OUT BEFORE YOU GOT MONEY TO BUY MORE.: NEVER TRUE

## 2025-02-12 SDOH — ECONOMIC STABILITY: FOOD INSECURITY: WITHIN THE PAST 12 MONTHS, THE FOOD YOU BOUGHT JUST DIDN'T LAST AND YOU DIDN'T HAVE MONEY TO GET MORE.: NEVER TRUE

## 2025-02-12 NOTE — PROGRESS NOTES
at [facility] OR    SMALL INTESTINE SURGERY      SMALL INTESTINE SURGERY      SMALL INTESTINE SURGERY      SPLENECTOMY      KRISTEL AND BSO (CERVIX REMOVED) Bilateral 1974    age 24    TONSILLECTOMY AND ADENOIDECTOMY      TOTAL KNEE ARTHROPLASTY Left 07/19/2022    UPPER GASTROINTESTINAL ENDOSCOPY  12/2011    gerd s/p gastric bypass    UPPER GASTROINTESTINAL ENDOSCOPY  02/2014    normal s/p gastric bypass    UPPER GASTROINTESTINAL ENDOSCOPY  02/2010    biopsy neg Barretts, chronic reflux esophagitis s/p gastric bypass    UPPER GASTROINTESTINAL ENDOSCOPY  07/2006    unremarkable s/p gastric bypass    UPPER GASTROINTESTINAL ENDOSCOPY  08/10/2015    Dr Khan    UPPER GASTROINTESTINAL ENDOSCOPY N/A 04/01/2016    Dr. Horvath:  H Pylori(-), HH, o/w normal    UPPER GASTROINTESTINAL ENDOSCOPY N/A 05/07/2021    Dr LAKHWINDER Horvath-Small hiatal hernia, previous gastric bypass surgery, gastritis    VENA CAVA FILTER PLACEMENT Right 2003       Family History   Problem Relation Age of Onset    Uterine Cancer Mother     Cervical Cancer Mother     Coronary Art Dis Mother     Arthritis Mother     Bleeding Prob Mother     Cancer Mother     Clotting Disorder Mother     Hearing Loss Mother     High Cholesterol Mother     Miscarriages / Stillbirths Mother     Obesity Mother     Heart Disease Father     Lung Cancer Father     Other Father         renal failure    Kidney Disease Father     Cervical Cancer Sister     Other Sister         fibromyalgia    Colon Cancer Brother     Colon Polyps Brother     Cancer Brother     Other Brother         owens    Uterine Cancer Maternal Grandmother     Cervical Cancer Maternal Grandmother     Diabetes Maternal Grandmother     Diabetes Paternal Aunt     Breast Cancer Maternal Cousin     Lupus Maternal Cousin     No Known Problems Daughter     Heart Disease Maternal Grandfather     Obesity Maternal Grandfather     Alcohol Abuse Brother     Diabetes Maternal Uncle     Lupus Maternal Cousin     Stroke Maternal Aunt

## 2025-02-12 NOTE — PROGRESS NOTES
HOME MONITORING REPORT    INR today:   Results for orders placed or performed in visit on 02/12/25   Protime-INR   Result Value Ref Range    INR 2.18        INR Goal: 2.0-3.0    Dosing Plan  As of 2/12/2025      TTR:  32.8% (6.7 y)   Full warfarin instructions:  2.5 mg every Tue, Sat; 5 mg all other days                PLAN: Advised patient/caregiver to continue current dose and recheck in one week.  Patient/Caregiver voiced understanding    Electronically signed by Regina Curtis MD on 2/13/2025 at 4:56 AM

## 2025-02-12 NOTE — PROGRESS NOTES
Chief Complaint   Patient presents with    Fatigue    Nausea     Pt feels nauseated after eating doesn't hardly have appetite     Flank Pain    Other     Pt states that she stays cold all of the time        HPI: Leny Stone is a 74 y.o. female is here for workup of type 2 diabetes, atrial fibrillation/SVT, hypertension, depression, chronic low back pain, sick sinus syndrome, hypertension, chronic kidney disease, sleep apnea, depression, iron deficiency anemia, peripheral neuropathy, hypothyroidism, gastroparesis, environmental allergies, acid reflux, hyperlipidemia, COPD and vitamin D deficiency.    She has had a history of a gastric bypass surgery in the past.  She is on B12 supplementation.    Today, she is not feeling well.  She states that she really has not been eating or drinking.  For the past 2 months, every time she eats or drinks, she vomits up what she eats.  She has lost 6 pounds since her last office visit.    She also states that she feels cold all the time.  She thinks that she may have a UTI.  She complain of flank pain.    She states that she also feels swollen.  She does have some lower extremity edema.  I am going to check a BNP today.  We got her lab work, she did have some acute kidney injury with a creatinine of 2.9 and a GFR 16.  I want to repeat a BMP today.  Her A1c is currently at 5.8.    She is requesting a B12 injection.    Her blood sugars are running around 200.  She states that they are \"up-and-down.  She also states that she has been somewhat more depressed recently.    Sometimes, she feels like her heart races and sometimes she gets dizzy with shortness of breath.  She does complain of some pressure in her chest.  She does have an appoint with her cardiologist tomorrow.    She states that she is having some difficulty pain for her inhalers.  Will set her up with [employer].    She also complains that her ears hurt.    She states that she was recently sent home from her job due to

## 2025-02-13 ENCOUNTER — OFFICE VISIT (OUTPATIENT)
Dept: CARDIOLOGY CLINIC | Age: 75
End: 2025-02-13

## 2025-02-13 VITALS
OXYGEN SATURATION: 99 % | RESPIRATION RATE: 20 BRPM | DIASTOLIC BLOOD PRESSURE: 102 MMHG | HEART RATE: 90 BPM | HEIGHT: 67 IN | WEIGHT: 244 LBS | BODY MASS INDEX: 38.3 KG/M2 | SYSTOLIC BLOOD PRESSURE: 168 MMHG

## 2025-02-13 DIAGNOSIS — I20.9 ANGINA PECTORIS (HCC): ICD-10-CM

## 2025-02-13 DIAGNOSIS — Z95.0 PACEMAKER: ICD-10-CM

## 2025-02-13 DIAGNOSIS — Z79.899 ON AMIODARONE THERAPY: Primary | ICD-10-CM

## 2025-02-13 DIAGNOSIS — Z79.01 CHRONIC ANTICOAGULATION: ICD-10-CM

## 2025-02-13 DIAGNOSIS — R07.9 CHEST PAIN, UNSPECIFIED TYPE: ICD-10-CM

## 2025-02-13 DIAGNOSIS — I48.0 PAROXYSMAL ATRIAL FIBRILLATION (HCC): ICD-10-CM

## 2025-02-13 DIAGNOSIS — R06.02 SHORTNESS OF BREATH: ICD-10-CM

## 2025-02-13 DIAGNOSIS — I10 PRIMARY HYPERTENSION: ICD-10-CM

## 2025-02-13 DIAGNOSIS — I49.5 SSS (SICK SINUS SYNDROME) (HCC): ICD-10-CM

## 2025-02-13 NOTE — PROGRESS NOTES
Pacemaker interrogated  Presenting rhythm:  AP/VS, AP 95.9%,  0.7%  Battey voltage 4.0 years  Lead status:  Lead impedance within range and stable  Sensing:  P waves 1.5 mV,  R waves 3.9 mV  Thresholds:  Atrial 0.500 V @ 0.4ms, ventricular 1.750 V @ 0.4ms  Observations:  4 monitored episodes NSVT  See scanned report for details  Reprogramming for sensitivity and threshold testing  [facility]  [facility]

## 2025-02-13 NOTE — PROGRESS NOTES
[facility] Progress Note                            Date:  2/13/2025  Patient: Leny Stone  Age:  74 y.o., 1950      Reason for evaluation:         SUBJECTIVE:    Returns today follow-up assessment followed for paroxysmal atrial fibrillation on amiodarone therapy chronic anticoagulation pacemaker sinus node dysfunction overall not feeling so well complains of being tired excessively fatigued shortness of breath and sleepy all the time.  Occasional palpitations no definite exertional chest discomfort but she does think she gets more out of breath than she normally does.  Blood pressure 168/102 heart 90.  Echocardiogram from February 24 reviewed.  Will go ahead and obtain a repeat echocardiogram also Lexiscan and laboratory studies.  Device interrogated today atrial fibrillation burden less than 1% occasional episodes of SVT no other complaints or issues reported.    Review of Systems      OBJECTIVE:    BP (!) 168/102   Pulse 90   Resp 20   Ht 1.702 m (5' 7\")   Wt 110.7 kg (244 lb)   SpO2 99%   BMI 38.22 kg/m²     Labs:   CBC: No results for input(s): \"WBC\", \"HGB\", \"HCT\", \"PLT\" in the last 72 hours.  BMP:No results for input(s): \"NA\", \"K\", \"CO2\", \"BUN\", \"CREATININE\", \"LABGLOM\", \"GLUCOSE\" in the last 72 hours.  BNP: No results for input(s): \"BNP\" in the last 72 hours.  PT/INR:   Recent Labs     02/12/25  0000 02/12/25  1056   PROTIME  --  23.7*   INR 2.18 2.18*     APTT:No results for input(s): \"APTT\" in the last 72 hours.  CARDIAC ENZYMES:No results for input(s): \"CKTOTAL\", \"CKMB\", \"CKMBINDEX\", \"TROPONINI\" in the last 72 hours.  FASTING LIPID PANEL:  Lab Results   Component Value Date/Time    HDL 98 02/07/2025 09:28 AM    LDLDIRECT 95 02/26/2015 12:36 AM    TRIG 78 02/07/2025 09:28 AM     LIVER PROFILE:No results for input(s): \"AST\", \"ALT\", \"LABALBU\" in the last 72 hours.        Past Medical History:   Diagnosis Date    Abdominal pain     Abnormal EKG     Acute sinusitis     Acute superficial

## 2025-02-14 ENCOUNTER — OFFICE VISIT (OUTPATIENT)
Dept: GASTROENTEROLOGY | Age: 75
End: 2025-02-14
Payer: MEDICARE

## 2025-02-14 ENCOUNTER — TELEPHONE (OUTPATIENT)
Dept: INTERNAL MEDICINE | Age: 75
End: 2025-02-14

## 2025-02-14 VITALS
HEART RATE: 85 BPM | HEIGHT: 67 IN | BODY MASS INDEX: 37.67 KG/M2 | SYSTOLIC BLOOD PRESSURE: 170 MMHG | WEIGHT: 240 LBS | DIASTOLIC BLOOD PRESSURE: 105 MMHG | OXYGEN SATURATION: 98 %

## 2025-02-14 DIAGNOSIS — Z98.84 HISTORY OF ROUX-EN-Y GASTRIC BYPASS: ICD-10-CM

## 2025-02-14 DIAGNOSIS — Z80.0 FAMILY HISTORY OF COLON CANCER: ICD-10-CM

## 2025-02-14 DIAGNOSIS — R11.2 NON-INTRACTABLE VOMITING WITH NAUSEA: Primary | ICD-10-CM

## 2025-02-14 DIAGNOSIS — R11.2 NON-INTRACTABLE VOMITING WITH NAUSEA: ICD-10-CM

## 2025-02-14 DIAGNOSIS — R19.7 DIARRHEA, UNSPECIFIED TYPE: ICD-10-CM

## 2025-02-14 DIAGNOSIS — K21.9 GASTROESOPHAGEAL REFLUX DISEASE, UNSPECIFIED WHETHER ESOPHAGITIS PRESENT: ICD-10-CM

## 2025-02-14 DIAGNOSIS — R10.13 EPIGASTRIC PAIN: ICD-10-CM

## 2025-02-14 DIAGNOSIS — Z87.898 HISTORY OF ULCER DISEASE: ICD-10-CM

## 2025-02-14 LAB
ALBUMIN SERPL-MCNC: 4.2 G/DL (ref 3.5–5.2)
ALP SERPL-CCNC: 59 U/L (ref 35–104)
ALT SERPL-CCNC: 9 U/L (ref 5–33)
ANION GAP SERPL CALCULATED.3IONS-SCNC: 15 MMOL/L (ref 8–16)
AST SERPL-CCNC: 23 U/L (ref 5–32)
BASOPHILS # BLD: 0 K/UL (ref 0–0.2)
BASOPHILS NFR BLD: 0.7 % (ref 0–1)
BILIRUB SERPL-MCNC: 0.6 MG/DL (ref 0.2–1.2)
BUN SERPL-MCNC: 18 MG/DL (ref 8–23)
CALCIUM SERPL-MCNC: 9.5 MG/DL (ref 8.8–10.2)
CHLORIDE SERPL-SCNC: 108 MMOL/L (ref 98–107)
CO2 SERPL-SCNC: 21 MMOL/L (ref 22–29)
CREAT SERPL-MCNC: 1.2 MG/DL (ref 0.5–0.9)
EOSINOPHIL # BLD: 0.1 K/UL (ref 0–0.6)
EOSINOPHIL NFR BLD: 1.4 % (ref 0–5)
ERYTHROCYTE [DISTWIDTH] IN BLOOD BY AUTOMATED COUNT: 16.1 % (ref 11.5–14.5)
GLUCOSE SERPL-MCNC: 85 MG/DL (ref 70–99)
HCT VFR BLD AUTO: 35.9 % (ref 37–47)
HGB BLD-MCNC: 11.3 G/DL (ref 12–16)
IMM GRANULOCYTES # BLD: 0 K/UL
LYMPHOCYTES # BLD: 2.1 K/UL (ref 1.1–4.5)
LYMPHOCYTES NFR BLD: 36.8 % (ref 20–40)
MCH RBC QN AUTO: 29.9 PG (ref 27–31)
MCHC RBC AUTO-ENTMCNC: 31.5 G/DL (ref 33–37)
MCV RBC AUTO: 95 FL (ref 81–99)
MONOCYTES # BLD: 0.7 K/UL (ref 0–0.9)
MONOCYTES NFR BLD: 12.9 % (ref 0–10)
NEUTROPHILS # BLD: 2.7 K/UL (ref 1.5–7.5)
NEUTS SEG NFR BLD: 47.5 % (ref 50–65)
PLATELET # BLD AUTO: 156 K/UL (ref 130–400)
PMV BLD AUTO: 12.8 FL (ref 9.4–12.3)
POTASSIUM SERPL-SCNC: 4.4 MMOL/L (ref 3.5–5.1)
PROT SERPL-MCNC: 8 G/DL (ref 6.4–8.3)
RBC # BLD AUTO: 3.78 M/UL (ref 4.2–5.4)
SODIUM SERPL-SCNC: 144 MMOL/L (ref 136–145)
WBC # BLD AUTO: 5.7 K/UL (ref 4.8–10.8)

## 2025-02-14 PROCEDURE — 3017F COLORECTAL CA SCREEN DOC REV: CPT

## 2025-02-14 PROCEDURE — G8399 PT W/DXA RESULTS DOCUMENT: HCPCS

## 2025-02-14 PROCEDURE — G8417 CALC BMI ABV UP PARAM F/U: HCPCS

## 2025-02-14 PROCEDURE — 1123F ACP DISCUSS/DSCN MKR DOCD: CPT

## 2025-02-14 PROCEDURE — 1090F PRES/ABSN URINE INCON ASSESS: CPT

## 2025-02-14 PROCEDURE — 3077F SYST BP >= 140 MM HG: CPT

## 2025-02-14 PROCEDURE — 1036F TOBACCO NON-USER: CPT

## 2025-02-14 PROCEDURE — 1159F MED LIST DOCD IN RCRD: CPT

## 2025-02-14 PROCEDURE — G8427 DOCREV CUR MEDS BY ELIG CLIN: HCPCS

## 2025-02-14 PROCEDURE — 99214 OFFICE O/P EST MOD 30 MIN: CPT

## 2025-02-14 PROCEDURE — 3080F DIAST BP >= 90 MM HG: CPT

## 2025-02-14 RX ORDER — METOCLOPRAMIDE 5 MG/1
5 TABLET ORAL
Qty: 120 TABLET | Refills: 1 | Status: SHIPPED | OUTPATIENT
Start: 2025-02-14 | End: 2025-03-16

## 2025-02-14 ASSESSMENT — ENCOUNTER SYMPTOMS
ANAL BLEEDING: 0
CONSTIPATION: 0
BLOOD IN STOOL: 0
TROUBLE SWALLOWING: 0
ABDOMINAL PAIN: 1
BACK PAIN: 0
WHEEZING: 0
VOICE CHANGE: 0
STRIDOR: 0
SHORTNESS OF BREATH: 0
APNEA: 0
CHOKING: 1
NAUSEA: 1
TROUBLE SWALLOWING: 1
COUGH: 0
EYES NEGATIVE: 1
SINUS PRESSURE: 0
EYE DISCHARGE: 0
DIARRHEA: 1
SINUS PAIN: 0
CHEST TIGHTNESS: 0
BLOOD IN STOOL: 0
ALLERGIC/IMMUNOLOGIC NEGATIVE: 1
NAUSEA: 1
RHINORRHEA: 0
VOMITING: 1
COLOR CHANGE: 0
ABDOMINAL DISTENTION: 0
ABDOMINAL PAIN: 0
VOMITING: 1
DIARRHEA: 0
EYE ITCHING: 0
SHORTNESS OF BREATH: 0

## 2025-02-14 NOTE — TELEPHONE ENCOUNTER
Please fax her most recent renal function studies to Dr. Connor.  It does look like she finally got her lab work done at GI today.  It looks like her kidney function did improve significantly

## 2025-02-14 NOTE — PATIENT INSTRUCTIONS
colon prep.     Stop drinking clear liquids for a few hours before the test. Your doctor will tell you how many hours this will be.   What happens on the day of the procedure?   Follow the instructions exactly about when to stop eating and drinking. If you don't, your procedure may be canceled. If your doctor told you to take your medicines on the day of the procedure, take them with only a sip of water.     Take a bath or shower before you come in for your procedure. Do not apply lotions, perfumes, deodorants, or nail polish.     Take off all jewelry and piercings. And take out contact lenses, if you wear them.   At the doctor's office or hospital   Bring a picture ID.     You will be kept comfortable and safe by your anesthesia provider. The anesthesia may make you sleep.     You will lie on your back or your side with your knees drawn up toward your belly. The doctor will gently put a gloved finger into your anus. Then the doctor puts the scope in and moves it into your colon. The scope goes in easily because it is lubricated.     The doctor may also use small tools to take tissue samples for a biopsy or to remove polyps. This does not hurt.     The test usually takes 30 to 45 minutes. But it may take longer. It depends on what is found and what is done.   When should you call your doctor?   You have questions or concerns.     You don't understand how to prepare for your procedure.     You are having trouble with the bowel prep.     You become ill before the procedure (such as fever, flu, or a cold).     You need to reschedule or have changed your mind about having the procedure.   Where can you learn more?  Go to https://www.[employer].net/patientEd and enter C315 to learn more about \"Colonoscopy: Before Your Procedure.\"  Current as of: October 25, 2023  Content Version: 14.3  © 2024 [employer].   Care instructions adapted under license by [employer]. If you have questions about a medical condition

## 2025-02-15 DIAGNOSIS — F33.2 SEVERE EPISODE OF RECURRENT MAJOR DEPRESSIVE DISORDER, WITHOUT PSYCHOTIC FEATURES (HCC): Primary | ICD-10-CM

## 2025-02-17 RX ORDER — ARIPIPRAZOLE 5 MG/1
5 TABLET ORAL DAILY
Qty: 90 TABLET | Refills: 1 | Status: SHIPPED | OUTPATIENT
Start: 2025-02-17

## 2025-02-18 ENCOUNTER — TELEPHONE (OUTPATIENT)
Dept: CARDIOLOGY CLINIC | Age: 75
End: 2025-02-18

## 2025-02-18 DIAGNOSIS — I48.91 ATRIAL FIBRILLATION, UNSPECIFIED TYPE (HCC): ICD-10-CM

## 2025-02-18 NOTE — TELEPHONE ENCOUNTER
Date: 4/2/25    Cardiologist: Gabo    Procedure: Endo & Colon    Surgeon: Aelxandru    Last Office Visit: 2/13/25  Reason for office visit and medical concerns addressed at this office visit: copd, ckd, afib, vt, sss, dm, h/o DVT h/o PE, htn, hyperlipidemia, chf,     Testing Performed and Date of Service:  4/15/25 Tonia [facility] Echo     RCRI = 11   METs 3    Current Medications: warfarin, stiolto, carafate, crestor, protonix, singulair, metoprolol, reglan, lisinopril, levothyroxine, gabapentin, allegra, iron, lexapro, cefdinir, bumex, baclofen, aspirin, abilify, amiodarone    Is the patient currently taking an anticoagulant? If so, what is the diagnosis the patient has been given to warrant the need for the anticoagulant? Warfarin for afib    Additional Notes: requesting cardiac clearance & to hold warfarin

## 2025-02-18 NOTE — RESULT ENCOUNTER NOTE
Kidney function is looking better, liver function normal, other labs are staying consistent with anemia

## 2025-02-19 RX ORDER — METOPROLOL TARTRATE 25 MG/1
TABLET, FILM COATED ORAL
Qty: 180 TABLET | Refills: 0 | OUTPATIENT
Start: 2025-02-19

## 2025-02-20 ENCOUNTER — TELEPHONE (OUTPATIENT)
Dept: GASTROENTEROLOGY | Age: 75
End: 2025-02-20

## 2025-02-20 ENCOUNTER — ANTI-COAG VISIT (OUTPATIENT)
Dept: PRIMARY CARE CLINIC | Age: 75
End: 2025-02-20
Payer: MEDICARE

## 2025-02-20 DIAGNOSIS — Z98.84 HISTORY OF ROUX-EN-Y GASTRIC BYPASS: ICD-10-CM

## 2025-02-20 DIAGNOSIS — Z79.01 ANTICOAGULATED ON COUMADIN: Primary | ICD-10-CM

## 2025-02-20 DIAGNOSIS — R11.2 NON-INTRACTABLE VOMITING WITH NAUSEA: ICD-10-CM

## 2025-02-20 LAB — INR BLD: 8

## 2025-02-20 PROCEDURE — 93793 ANTICOAG MGMT PT WARFARIN: CPT | Performed by: INTERNAL MEDICINE

## 2025-02-20 RX ORDER — METOCLOPRAMIDE 5 MG/1
5 TABLET ORAL
Qty: 360 TABLET | Refills: 0 | Status: SHIPPED | OUTPATIENT
Start: 2025-02-20 | End: 2025-05-21

## 2025-02-20 NOTE — PROGRESS NOTES
HOME MONITORING REPORT    INR today:   Results for orders placed or performed in visit on 02/20/25   Protime-INR   Result Value Ref Range    INR 8.00        INR Goal: 2.0-3.0    Dosing Plan  As of 2/20/2025      TTR:  32.7% (6.7 y)   Full warfarin instructions:  2/20: Hold; Otherwise 2.5 mg every Tue, Sat; 5 mg all other days              PATIENT STATES SHE CHECKED HER INR AND IT READ GREATER THAN 8. SHE ALSO STATES SHE HAS MISSED A COUPLE OF DOSES THIS WEEK. HOLD COUMADIN TONIGHT. SHE WILL BE IN THE OFFICE ON 02/21 AND WE WILL HAVE HER RECHECKED AT THAT TIME.   Patient/Caregiver voiced understanding    Electronically signed by Regina Curtis MD on 2/20/2025 at 10:27 AM

## 2025-02-20 NOTE — TELEPHONE ENCOUNTER
02- [facility] mail order pharmacy requested a refill on Metoclopramide 5mg     Routed to LT APRN

## 2025-02-21 ENCOUNTER — ANTI-COAG VISIT (OUTPATIENT)
Dept: PRIMARY CARE CLINIC | Age: 75
End: 2025-02-21
Payer: MEDICARE

## 2025-02-21 ENCOUNTER — OFFICE VISIT (OUTPATIENT)
Dept: INTERNAL MEDICINE | Age: 75
End: 2025-02-21

## 2025-02-21 VITALS
SYSTOLIC BLOOD PRESSURE: 156 MMHG | DIASTOLIC BLOOD PRESSURE: 90 MMHG | WEIGHT: 240 LBS | OXYGEN SATURATION: 100 % | HEART RATE: 80 BPM | BODY MASS INDEX: 37.67 KG/M2 | HEIGHT: 67 IN

## 2025-02-21 DIAGNOSIS — Z87.440 RECENT URINARY TRACT INFECTION: ICD-10-CM

## 2025-02-21 DIAGNOSIS — N18.9 ACUTE KIDNEY INJURY SUPERIMPOSED ON CHRONIC KIDNEY DISEASE: ICD-10-CM

## 2025-02-21 DIAGNOSIS — M54.40 CHRONIC LOW BACK PAIN WITH SCIATICA, SCIATICA LATERALITY UNSPECIFIED, UNSPECIFIED BACK PAIN LATERALITY: ICD-10-CM

## 2025-02-21 DIAGNOSIS — R11.2 NAUSEA AND VOMITING, UNSPECIFIED VOMITING TYPE: Primary | ICD-10-CM

## 2025-02-21 DIAGNOSIS — N17.9 AKI (ACUTE KIDNEY INJURY): ICD-10-CM

## 2025-02-21 DIAGNOSIS — K13.79 MOUTH SORE: ICD-10-CM

## 2025-02-21 DIAGNOSIS — G47.30 SLEEP APNEA, UNSPECIFIED TYPE: ICD-10-CM

## 2025-02-21 DIAGNOSIS — I48.91 ATRIAL FIBRILLATION WITH RAPID VENTRICULAR RESPONSE (HCC): ICD-10-CM

## 2025-02-21 DIAGNOSIS — F32.89 OTHER DEPRESSION: ICD-10-CM

## 2025-02-21 DIAGNOSIS — E11.49 OTHER DIABETIC NEUROLOGICAL COMPLICATION ASSOCIATED WITH TYPE 2 DIABETES MELLITUS (HCC): ICD-10-CM

## 2025-02-21 DIAGNOSIS — K13.0 CHEILOSIS: ICD-10-CM

## 2025-02-21 DIAGNOSIS — I10 PRIMARY HYPERTENSION: ICD-10-CM

## 2025-02-21 DIAGNOSIS — Z79.01 ANTICOAGULATED ON COUMADIN: Primary | ICD-10-CM

## 2025-02-21 DIAGNOSIS — E11.21 TYPE II DIABETES MELLITUS WITH NEPHROPATHY (HCC): ICD-10-CM

## 2025-02-21 DIAGNOSIS — E03.9 HYPOTHYROIDISM, UNSPECIFIED TYPE: ICD-10-CM

## 2025-02-21 DIAGNOSIS — E55.9 VITAMIN D DEFICIENCY: ICD-10-CM

## 2025-02-21 DIAGNOSIS — N17.9 ACUTE KIDNEY INJURY SUPERIMPOSED ON CHRONIC KIDNEY DISEASE: ICD-10-CM

## 2025-02-21 DIAGNOSIS — G89.29 CHRONIC LOW BACK PAIN WITH SCIATICA, SCIATICA LATERALITY UNSPECIFIED, UNSPECIFIED BACK PAIN LATERALITY: ICD-10-CM

## 2025-02-21 DIAGNOSIS — Z91.09 ENVIRONMENTAL ALLERGIES: ICD-10-CM

## 2025-02-21 DIAGNOSIS — K21.9 GASTROESOPHAGEAL REFLUX DISEASE WITHOUT ESOPHAGITIS: ICD-10-CM

## 2025-02-21 DIAGNOSIS — K13.0 ANGULAR CHEILOSIS: ICD-10-CM

## 2025-02-21 DIAGNOSIS — Z86.718 HISTORY OF DVT (DEEP VEIN THROMBOSIS): ICD-10-CM

## 2025-02-21 LAB
ANION GAP SERPL CALCULATED.3IONS-SCNC: 13 MMOL/L (ref 8–16)
BACTERIA URNS QL MICRO: NORMAL /HPF
BILIRUB UR QL STRIP: NEGATIVE
BUN SERPL-MCNC: 33 MG/DL (ref 8–23)
CALCIUM SERPL-MCNC: 9.4 MG/DL (ref 8.8–10.2)
CHLORIDE SERPL-SCNC: 108 MMOL/L (ref 98–107)
CLARITY UR: ABNORMAL
CO2 SERPL-SCNC: 21 MMOL/L (ref 22–29)
COLOR UR: YELLOW
CREAT SERPL-MCNC: 1.4 MG/DL (ref 0.5–0.9)
CRYSTALS URNS MICRO: NORMAL /HPF
EPI CELLS #/AREA URNS AUTO: 4 /HPF (ref 0–5)
GLUCOSE SERPL-MCNC: 83 MG/DL (ref 70–99)
GLUCOSE UR STRIP.AUTO-MCNC: NEGATIVE MG/DL
HGB UR STRIP.AUTO-MCNC: NEGATIVE MG/L
HYALINE CASTS #/AREA URNS AUTO: 3 /HPF (ref 0–8)
INTERNATIONAL NORMALIZATION RATIO, POC: 1.6
KETONES UR STRIP.AUTO-MCNC: ABNORMAL MG/DL
LEUKOCYTE ESTERASE UR QL STRIP.AUTO: ABNORMAL
NITRITE UR QL STRIP.AUTO: NEGATIVE
PH UR STRIP.AUTO: 5.5 [PH] (ref 5–8)
POTASSIUM SERPL-SCNC: 5 MMOL/L (ref 3.5–5.1)
PROT UR STRIP.AUTO-MCNC: ABNORMAL MG/DL
RBC #/AREA URNS AUTO: 1 /HPF (ref 0–4)
SODIUM SERPL-SCNC: 142 MMOL/L (ref 136–145)
SP GR UR STRIP.AUTO: 1.02 (ref 1–1.03)
UROBILINOGEN UR STRIP.AUTO-MCNC: 1 E.U./DL
VIT B12 SERPL-MCNC: 1217 PG/ML (ref 232–1245)
WBC #/AREA URNS AUTO: 4 /HPF (ref 0–5)

## 2025-02-21 PROCEDURE — 85610 PROTHROMBIN TIME: CPT | Performed by: INTERNAL MEDICINE

## 2025-02-21 NOTE — PROGRESS NOTES
Ms. Leny Stone was here today.   INR today:   Results for orders placed or performed in visit on 02/21/25   POCT INR   Result Value Ref Range    INR,(POC) 1.6      INR Goal: 2.0-3.0    Dosing Plan  As of 2/21/2025      TTR:  32.7% (6.7 y)   Full warfarin instructions:  2/21: 7.5 mg; 2/22: 5 mg; Otherwise 2.5 mg every Tue, Sat; 5 mg all other days           INCREASE DOSE TONIGHT TO 7.5 MG. INCREASE DOSE TOMORROW TO 5 MG. THEN    PLAN: CONTINUE CURRENT DOSE  RECHECK AT HOME NEXT WEEK.      Coumadin Clinic Hours  Monday           8:00am - 4:00pm  Tuesday 8:00am - 4:00pm  Wednesday  10:00am - 4:00pm  Thursday 8:00am - 4:00pm    IF IT'S AN EMERGENCY, PLEASE CALL 911 OR GO TO YOUR NEAREST EMERGENCY ROOM.    [facility] COUMADIN [facility] [phone] (JOVAYN'S DIRECT LINE)  IF UNABLE TO REACH COUMADIN CLINIC, PLEASE CALL YOUR DOCTOR,     INTERNAL  MEDICINE [phone].    PRIMARY CARE  [phone]    FAMILY MEDICINE  [phone]    Electronically signed by Jovany Curtis MD on 2/21/2025 at 9:10 AM

## 2025-02-21 NOTE — PROGRESS NOTES
allergies: Continue Singulair    11.  Acid reflux: Continue Protonix    12.  Chronic kidney disease: Continue sodium bicarbonate    13.  Atrial fibrillation/history of DVT continue Coumadin therapy    14.  Diabetic neuropathy: Continue gabapentin as prescribed    15. Cheliosis: Check a B12 level        Leny was seen today for follow-up.    Diagnoses and all orders for this visit:    Nausea and vomiting, unspecified vomiting type    MIREYA (acute kidney injury)  -     Basic Metabolic Panel; Future    Recent urinary tract infection  -     Urinalysis with Reflex to Culture; Future    Cheilosis  -     Vitamin B12; Future    Type II diabetes mellitus with nephropathy (HCC)  -     Diabetic Foot Exam    Atrial fibrillation with rapid ventricular response (HCC)    Primary hypertension    Chronic low back pain with sciatica, sciatica laterality unspecified, unspecified back pain laterality    Vitamin D deficiency    Sleep apnea, unspecified type    Other depression    Hypothyroidism, unspecified type    Environmental allergies    Gastroesophageal reflux disease without esophagitis    Acute kidney injury superimposed on chronic kidney disease    History of DVT (deep vein thrombosis)    Other diabetic neurological complication associated with type 2 diabetes mellitus (HCC)    Angular cheilosis    Other orders  -     Magic Mouthwash (MIRACLE MOUTHWASH); Swish and spit 5 mLs 4 times daily as needed for Irritation  -     Semaglutide,0.25 or 0.5MG/DOS, 2 MG/3ML SOPN; Inject 0.5 mg into the skin every 7 days          Return as scheduled.     Orders Placed This Encounter   Procedures    Basic Metabolic Panel     Standing Status:   Future     Number of Occurrences:   1     Standing Expiration Date:   2/21/2026    Urinalysis with Reflex to Culture     Standing Status:   Future     Number of Occurrences:   1     Standing Expiration Date:   2/21/2026     Order Specific Question:   SPECIFY(EX-CATH,MIDSTREAM,CYSTO,ETC)?     Answer:   clean

## 2025-02-22 ASSESSMENT — ENCOUNTER SYMPTOMS
TROUBLE SWALLOWING: 0
SINUS PAIN: 0
CONSTIPATION: 0
DIARRHEA: 0
BACK PAIN: 0
CHEST TIGHTNESS: 0
APNEA: 0
RHINORRHEA: 0
SINUS PRESSURE: 0
EYE ITCHING: 0
VOMITING: 1
VOICE CHANGE: 0
BLOOD IN STOOL: 0
WHEEZING: 0
ABDOMINAL PAIN: 0
STRIDOR: 0
EYE DISCHARGE: 0
NAUSEA: 1
ABDOMINAL DISTENTION: 0
SHORTNESS OF BREATH: 0
COLOR CHANGE: 0
COUGH: 0

## 2025-02-27 LAB — INR BLD: 1.3

## 2025-02-28 ENCOUNTER — ANTI-COAG VISIT (OUTPATIENT)
Dept: PRIMARY CARE CLINIC | Age: 75
End: 2025-02-28
Payer: MEDICARE

## 2025-02-28 DIAGNOSIS — Z79.01 ANTICOAGULATED ON COUMADIN: Primary | ICD-10-CM

## 2025-02-28 PROCEDURE — 93793 ANTICOAG MGMT PT WARFARIN: CPT | Performed by: INTERNAL MEDICINE

## 2025-02-28 NOTE — PROGRESS NOTES
HOME MONITORING REPORT    INR today:   Results for orders placed or performed in visit on 02/28/25   Protime-INR   Result Value Ref Range    INR 1.30        INR Goal: 2.0-3.0    Dosing Plan  As of 2/28/2025      TTR:  32.7% (6.7 y)   Full warfarin instructions:  2.5 mg every Tue, Sat; 5 mg all other days              UNABLE TO REACH PATIENT BY PHONE AFTER MULTIPLE ATTEMPTS. WILL TRY AGAIN NEXT BUSINESS DAY.  PLAN: Advised patient/caregiver to continue current dose and recheck in one week.  Patient/Caregiver voiced understanding    Electronically signed by Regina Curtis MD on 3/2/2025 at 7:51 AM

## 2025-03-06 ENCOUNTER — HOSPITAL ENCOUNTER (INPATIENT)
Age: 75
LOS: 12 days | Discharge: HOME | DRG: 069 | End: 2025-03-18
Attending: EMERGENCY MEDICINE | Admitting: STUDENT IN AN ORGANIZED HEALTH CARE EDUCATION/TRAINING PROGRAM
Payer: MEDICARE

## 2025-03-06 ENCOUNTER — APPOINTMENT (OUTPATIENT)
Dept: CT IMAGING | Age: 75
DRG: 069 | End: 2025-03-06
Payer: MEDICARE

## 2025-03-06 ENCOUNTER — APPOINTMENT (OUTPATIENT)
Age: 75
DRG: 069 | End: 2025-03-06
Payer: MEDICARE

## 2025-03-06 DIAGNOSIS — R41.82 ALTERED MENTAL STATUS, UNSPECIFIED ALTERED MENTAL STATUS TYPE: Primary | ICD-10-CM

## 2025-03-06 DIAGNOSIS — R07.9 CHEST PAIN, UNSPECIFIED TYPE: ICD-10-CM

## 2025-03-06 DIAGNOSIS — I20.9 ANGINA PECTORIS: ICD-10-CM

## 2025-03-06 LAB
ALBUMIN SERPL-MCNC: 3.5 G/DL (ref 3.5–5.2)
ALLENS TEST: ABNORMAL
ALP SERPL-CCNC: 59 U/L (ref 35–104)
ALT SERPL-CCNC: 6 U/L (ref 10–35)
AMMONIA PLAS-SCNC: 22 UMOL/L (ref 11–51)
AMPHET UR QL SCN: NEGATIVE
ANION GAP SERPL CALCULATED.3IONS-SCNC: 13 MMOL/L (ref 8–16)
AST SERPL-CCNC: 23 U/L (ref 10–35)
BACTERIA URNS QL MICRO: NEGATIVE /HPF
BARBITURATES UR QL SCN: NEGATIVE
BASE EXCESS ARTERIAL: -0.5 MMOL/L (ref -2–2)
BASOPHILS # BLD: 0 K/UL (ref 0–0.2)
BASOPHILS NFR BLD: 0.3 % (ref 0–1)
BENZODIAZ UR QL SCN: NEGATIVE
BILIRUB SERPL-MCNC: 0.9 MG/DL (ref 0.2–1.2)
BILIRUB UR QL STRIP: NEGATIVE
BUN SERPL-MCNC: 19 MG/DL (ref 8–23)
BUPRENORPHINE URINE: NEGATIVE
CALCIUM SERPL-MCNC: 8.8 MG/DL (ref 8.8–10.2)
CANNABINOIDS UR QL SCN: NEGATIVE
CARBOXYHEMOGLOBIN ARTERIAL: 1.8 % (ref 0–5)
CHLORIDE SERPL-SCNC: 103 MMOL/L (ref 98–107)
CLARITY UR: CLEAR
CO2 SERPL-SCNC: 20 MMOL/L (ref 22–29)
COCAINE UR QL SCN: NEGATIVE
COLOR UR: YELLOW
CREAT SERPL-MCNC: 1.6 MG/DL (ref 0.5–0.9)
CRYSTALS URNS MICRO: NORMAL /HPF
DRUG SCREEN COMMENT UR-IMP: NORMAL
ECHO AO ASC DIAM: 3.4 CM
ECHO AO ASCENDING AORTA INDEX: 1.56 CM/M2
ECHO AO ROOT DIAM: 2.1 CM
ECHO AO ROOT INDEX: 0.96 CM/M2
ECHO AO SINUS VALSALVA DIAM: 3.1 CM
ECHO AO SINUS VALSALVA INDEX: 1.42 CM/M2
ECHO AO ST JNCT DIAM: 2.4 CM
ECHO AV AREA PEAK VELOCITY: 2.7 CM2
ECHO AV AREA VTI: 3.1 CM2
ECHO AV AREA/BSA PEAK VELOCITY: 1.2 CM2/M2
ECHO AV AREA/BSA VTI: 1.4 CM2/M2
ECHO AV MEAN GRADIENT: 4 MMHG
ECHO AV MEAN VELOCITY: 1 M/S
ECHO AV PEAK GRADIENT: 7 MMHG
ECHO AV PEAK VELOCITY: 1.3 M/S
ECHO AV VELOCITY RATIO: 0.77
ECHO AV VTI: 21.5 CM
ECHO BSA: 2.27 M2
ECHO EST RA PRESSURE: 3 MMHG
ECHO IVC PROX: 2 CM
ECHO LA AREA 2C: 32.4 CM2
ECHO LA AREA 4C: 35.8 CM2
ECHO LA DIAMETER INDEX: 2.39 CM/M2
ECHO LA DIAMETER: 5.2 CM
ECHO LA MAJOR AXIS: 8.6 CM
ECHO LA MINOR AXIS: 7 CM
ECHO LA TO AORTIC ROOT RATIO: 2.48
ECHO LA VOL BP: 131 ML (ref 22–52)
ECHO LA VOL MOD A2C: 125 ML (ref 22–52)
ECHO LA VOL MOD A4C: 126 ML (ref 22–52)
ECHO LA VOL/BSA BIPLANE: 60 ML/M2 (ref 16–34)
ECHO LA VOLUME INDEX MOD A2C: 57 ML/M2 (ref 16–34)
ECHO LA VOLUME INDEX MOD A4C: 58 ML/M2 (ref 16–34)
ECHO LV E' LATERAL VELOCITY: 8.05 CM/S
ECHO LV E' SEPTAL VELOCITY: 5.44 CM/S
ECHO LV EDV A2C: 109 ML
ECHO LV EDV A4C: 120 ML
ECHO LV EDV INDEX A4C: 55 ML/M2
ECHO LV EDV NDEX A2C: 50 ML/M2
ECHO LV EF PHYSICIAN: 60 %
ECHO LV EJECTION FRACTION A2C: 61 %
ECHO LV EJECTION FRACTION A4C: 61 %
ECHO LV EJECTION FRACTION BIPLANE: 60 % (ref 55–100)
ECHO LV ESV A2C: 43 ML
ECHO LV ESV A4C: 47 ML
ECHO LV ESV INDEX A2C: 20 ML/M2
ECHO LV ESV INDEX A4C: 22 ML/M2
ECHO LV FRACTIONAL SHORTENING: 33 % (ref 28–44)
ECHO LV INTERNAL DIMENSION DIASTOLE INDEX: 2.34 CM/M2
ECHO LV INTERNAL DIMENSION DIASTOLIC: 5.1 CM (ref 3.9–5.3)
ECHO LV INTERNAL DIMENSION SYSTOLIC INDEX: 1.56 CM/M2
ECHO LV INTERNAL DIMENSION SYSTOLIC: 3.4 CM
ECHO LV ISOVOLUMETRIC RELAXATION TIME (IVRT): 155 MS
ECHO LV IVSD: 1.2 CM (ref 0.6–0.9)
ECHO LV MASS 2D: 241.2 G (ref 67–162)
ECHO LV MASS INDEX 2D: 110.7 G/M2 (ref 43–95)
ECHO LV POSTERIOR WALL DIASTOLIC: 1.2 CM (ref 0.6–0.9)
ECHO LV RELATIVE WALL THICKNESS RATIO: 0.47
ECHO LVOT AREA: 3.5 CM2
ECHO LVOT AV VTI INDEX: 0.91
ECHO LVOT DIAM: 2.1 CM
ECHO LVOT MEAN GRADIENT: 2 MMHG
ECHO LVOT PEAK GRADIENT: 4 MMHG
ECHO LVOT PEAK VELOCITY: 1 M/S
ECHO LVOT STROKE VOLUME INDEX: 31 ML/M2
ECHO LVOT SV: 67.5 ML
ECHO LVOT VTI: 19.5 CM
ECHO MV A VELOCITY: 0.87 M/S
ECHO MV ANNULUS DIAMETER: 2.9 CM
ECHO MV AREA VTI: 4.2 CM2
ECHO MV E DECELERATION TIME (DT): 150 MS
ECHO MV E VELOCITY: 0.6 M/S
ECHO MV E/A RATIO: 0.69
ECHO MV E/E' LATERAL: 7.45
ECHO MV E/E' RATIO (AVERAGED): 9.24
ECHO MV E/E' SEPTAL: 11.03
ECHO MV LVOT VTI INDEX: 0.83
ECHO MV MAX VELOCITY: 0.8 M/S
ECHO MV MEAN GRADIENT: 2 MMHG
ECHO MV MEAN VELOCITY: 0.5 M/S
ECHO MV PEAK GRADIENT: 2 MMHG
ECHO MV VTI: 16.1 CM
ECHO RA AREA 4C: 15.7 CM2
ECHO RA END SYSTOLIC VOLUME APICAL 4 CHAMBER INDEX BSA: 17 ML/M2
ECHO RA MAJOR AXIS INDEX: 2.52 CM/M2
ECHO RA MAJOR AXIS: 5.5 CM
ECHO RA MINOR AXIS INDEX: 1.56 CM/M2
ECHO RA MINOR AXIS: 3.4 CM
ECHO RA VOLUME: 37 ML
ECHO RIGHT VENTRICULAR SYSTOLIC PRESSURE (RVSP): 31 MMHG
ECHO RV BASAL DIMENSION: 3.6 CM
ECHO RV INTERNAL DIMENSION: 3.6 CM
ECHO RV LONGITUDINAL DIMENSION: 8 CM
ECHO RV MID DIMENSION: 3.3 CM
ECHO RV TAPSE: 2.4 CM (ref 1.7–?)
ECHO TV REGURGITANT MAX VELOCITY: 2.64 M/S
ECHO TV REGURGITANT PEAK GRADIENT: 28 MMHG
EOSINOPHIL # BLD: 0.1 K/UL (ref 0–0.6)
EOSINOPHIL NFR BLD: 0.7 % (ref 0–5)
EPI CELLS #/AREA URNS AUTO: 4 /HPF (ref 0–5)
ERYTHROCYTE [DISTWIDTH] IN BLOOD BY AUTOMATED COUNT: 15.9 % (ref 11.5–14.5)
FENTANYL SCREEN, URINE: NEGATIVE
GLUCOSE BLD-MCNC: 116 MG/DL (ref 70–99)
GLUCOSE BLD-MCNC: 68 MG/DL
GLUCOSE BLD-MCNC: 68 MG/DL (ref 70–99)
GLUCOSE BLD-MCNC: 84 MG/DL (ref 70–99)
GLUCOSE BLD-MCNC: 86 MG/DL (ref 70–99)
GLUCOSE SERPL-MCNC: 163 MG/DL (ref 70–99)
GLUCOSE UR STRIP.AUTO-MCNC: NEGATIVE MG/DL
HCO3 ARTERIAL: 19.2 MMOL/L (ref 22–26)
HCT VFR BLD AUTO: 37.2 % (ref 37–47)
HEMOGLOBIN, ART, EXTENDED: 13 G/DL (ref 12–16)
HGB BLD-MCNC: 11.9 G/DL (ref 12–16)
HGB UR STRIP.AUTO-MCNC: ABNORMAL MG/L
HYALINE CASTS #/AREA URNS AUTO: 0 /HPF (ref 0–8)
IMM GRANULOCYTES # BLD: 0 K/UL
INR PPP: 1.29 (ref 0.88–1.18)
KETONES UR STRIP.AUTO-MCNC: ABNORMAL MG/DL
LACTATE BLDV-SCNC: 1.8 MMOL/L (ref 0.5–1.9)
LEUKOCYTE ESTERASE UR QL STRIP.AUTO: NEGATIVE
LIPASE SERPL-CCNC: 20 U/L (ref 13–60)
LYMPHOCYTES # BLD: 1 K/UL (ref 1.1–4.5)
LYMPHOCYTES NFR BLD: 14.1 % (ref 20–40)
MAGNESIUM SERPL-MCNC: 1.6 MG/DL (ref 1.6–2.4)
MCH RBC QN AUTO: 29.9 PG (ref 27–31)
MCHC RBC AUTO-ENTMCNC: 32 G/DL (ref 33–37)
MCV RBC AUTO: 93.5 FL (ref 81–99)
METHADONE UR QL SCN: NEGATIVE
METHAMPHETAMINE, URINE: NEGATIVE
METHEMOGLOBIN ARTERIAL: 0.9 %
MONOCYTES # BLD: 1.1 K/UL (ref 0–0.9)
MONOCYTES NFR BLD: 15.5 % (ref 0–10)
NEUTROPHILS # BLD: 5 K/UL (ref 1.5–7.5)
NEUTS SEG NFR BLD: 69 % (ref 50–65)
NITRITE UR QL STRIP.AUTO: NEGATIVE
O2 CONTENT ARTERIAL: 17.7 ML/DL
O2 DELIVERY DEVICE: ABNORMAL
O2 SAT, ARTERIAL: 96.3 %
O2 THERAPY: ABNORMAL
OPIATES UR QL SCN: NEGATIVE
OXYCODONE UR QL SCN: NEGATIVE
OXYGEN FLOW: 0
PCO2 ARTERIAL: 20 MMHG (ref 35–45)
PCP UR QL SCN: NEGATIVE
PERFORMED ON: ABNORMAL
PERFORMED ON: ABNORMAL
PERFORMED ON: NORMAL
PERFORMED ON: NORMAL
PH ARTERIAL: 7.59 (ref 7.35–7.45)
PH UR STRIP.AUTO: 7 [PH] (ref 5–8)
PLATELET # BLD AUTO: 88 K/UL (ref 130–400)
PMV BLD AUTO: 12.7 FL (ref 9.4–12.3)
PO2 ARTERIAL: 81 MMHG (ref 80–100)
POTASSIUM BLD-SCNC: 3.3 MMOL/L
POTASSIUM SERPL-SCNC: 3.8 MMOL/L (ref 3.5–5.1)
PROT SERPL-MCNC: 7.2 G/DL (ref 6.4–8.3)
PROT UR STRIP.AUTO-MCNC: 100 MG/DL
PROTHROMBIN TIME: 15.7 SEC (ref 12–14.6)
RBC # BLD AUTO: 3.98 M/UL (ref 4.2–5.4)
SAMPLE SOURCE: ABNORMAL
SODIUM SERPL-SCNC: 136 MMOL/L (ref 136–145)
SP GR UR STRIP.AUTO: 1.04 (ref 1–1.03)
SPONT RATE(HZ): 20
TRICYCLIC ANTIDEPRESSANTS, UR: NEGATIVE
TROPONIN, HIGH SENSITIVITY: 29 NG/L (ref 0–14)
TSH SERPL DL<=0.005 MIU/L-ACNC: 6.55 UIU/ML (ref 0.27–4.2)
UROBILINOGEN UR STRIP.AUTO-MCNC: 1 E.U./DL
WBC # BLD AUTO: 7.2 K/UL (ref 4.8–10.8)
WBC #/AREA URNS AUTO: 1 /HPF (ref 0–5)

## 2025-03-06 PROCEDURE — 83605 ASSAY OF LACTIC ACID: CPT

## 2025-03-06 PROCEDURE — 70498 CT ANGIOGRAPHY NECK: CPT

## 2025-03-06 PROCEDURE — 93306 TTE W/DOPPLER COMPLETE: CPT | Performed by: INTERNAL MEDICINE

## 2025-03-06 PROCEDURE — 6360000002 HC RX W HCPCS: Performed by: EMERGENCY MEDICINE

## 2025-03-06 PROCEDURE — 99285 EMERGENCY DEPT VISIT HI MDM: CPT

## 2025-03-06 PROCEDURE — 1200000000 HC SEMI PRIVATE

## 2025-03-06 PROCEDURE — 93005 ELECTROCARDIOGRAM TRACING: CPT | Performed by: EMERGENCY MEDICINE

## 2025-03-06 PROCEDURE — G0480 DRUG TEST DEF 1-7 CLASSES: HCPCS

## 2025-03-06 PROCEDURE — 80307 DRUG TEST PRSMV CHEM ANLYZR: CPT

## 2025-03-06 PROCEDURE — 82140 ASSAY OF AMMONIA: CPT

## 2025-03-06 PROCEDURE — 81001 URINALYSIS AUTO W/SCOPE: CPT

## 2025-03-06 PROCEDURE — 94760 N-INVAS EAR/PLS OXIMETRY 1: CPT

## 2025-03-06 PROCEDURE — 96374 THER/PROPH/DIAG INJ IV PUSH: CPT

## 2025-03-06 PROCEDURE — 80053 COMPREHEN METABOLIC PANEL: CPT

## 2025-03-06 PROCEDURE — 74177 CT ABD & PELVIS W/CONTRAST: CPT

## 2025-03-06 PROCEDURE — 82962 GLUCOSE BLOOD TEST: CPT

## 2025-03-06 PROCEDURE — 6360000004 HC RX CONTRAST MEDICATION: Performed by: EMERGENCY MEDICINE

## 2025-03-06 PROCEDURE — 71260 CT THORAX DX C+: CPT

## 2025-03-06 PROCEDURE — 6360000004 HC RX CONTRAST MEDICATION

## 2025-03-06 PROCEDURE — 70496 CT ANGIOGRAPHY HEAD: CPT

## 2025-03-06 PROCEDURE — 36415 COLL VENOUS BLD VENIPUNCTURE: CPT

## 2025-03-06 PROCEDURE — 70450 CT HEAD/BRAIN W/O DYE: CPT

## 2025-03-06 PROCEDURE — 85610 PROTHROMBIN TIME: CPT

## 2025-03-06 PROCEDURE — 94640 AIRWAY INHALATION TREATMENT: CPT

## 2025-03-06 PROCEDURE — 83690 ASSAY OF LIPASE: CPT

## 2025-03-06 PROCEDURE — 84484 ASSAY OF TROPONIN QUANT: CPT

## 2025-03-06 PROCEDURE — 0042T CT BRAIN PERFUSION: CPT

## 2025-03-06 PROCEDURE — 82803 BLOOD GASES ANY COMBINATION: CPT

## 2025-03-06 PROCEDURE — 83735 ASSAY OF MAGNESIUM: CPT

## 2025-03-06 PROCEDURE — C8929 TTE W OR WO FOL WCON,DOPPLER: HCPCS

## 2025-03-06 PROCEDURE — 84443 ASSAY THYROID STIM HORMONE: CPT

## 2025-03-06 PROCEDURE — 36600 WITHDRAWAL OF ARTERIAL BLOOD: CPT

## 2025-03-06 PROCEDURE — 85025 COMPLETE CBC W/AUTO DIFF WBC: CPT

## 2025-03-06 PROCEDURE — 6370000000 HC RX 637 (ALT 250 FOR IP)

## 2025-03-06 RX ORDER — PANTOPRAZOLE SODIUM 40 MG/1
40 TABLET, DELAYED RELEASE ORAL
Status: DISCONTINUED | OUTPATIENT
Start: 2025-03-06 | End: 2025-03-18 | Stop reason: HOSPADM

## 2025-03-06 RX ORDER — ASPIRIN 81 MG/1
81 TABLET, CHEWABLE ORAL DAILY
Status: DISCONTINUED | OUTPATIENT
Start: 2025-03-06 | End: 2025-03-07

## 2025-03-06 RX ORDER — POLYETHYLENE GLYCOL 3350 17 G/17G
17 POWDER, FOR SOLUTION ORAL DAILY PRN
Status: DISCONTINUED | OUTPATIENT
Start: 2025-03-06 | End: 2025-03-18 | Stop reason: HOSPADM

## 2025-03-06 RX ORDER — ROSUVASTATIN CALCIUM 20 MG/1
40 TABLET, COATED ORAL DAILY
Status: DISCONTINUED | OUTPATIENT
Start: 2025-03-06 | End: 2025-03-11 | Stop reason: DRUGHIGH

## 2025-03-06 RX ORDER — SUCRALFATE 1 G/1
1 TABLET ORAL
Status: DISCONTINUED | OUTPATIENT
Start: 2025-03-06 | End: 2025-03-18 | Stop reason: HOSPADM

## 2025-03-06 RX ORDER — ARIPIPRAZOLE 5 MG/1
5 TABLET ORAL DAILY
Status: DISCONTINUED | OUTPATIENT
Start: 2025-03-06 | End: 2025-03-14

## 2025-03-06 RX ORDER — METOCLOPRAMIDE 10 MG/1
5 TABLET ORAL
Status: DISCONTINUED | OUTPATIENT
Start: 2025-03-06 | End: 2025-03-14

## 2025-03-06 RX ORDER — LABETALOL HYDROCHLORIDE 5 MG/ML
10 INJECTION, SOLUTION INTRAVENOUS EVERY 10 MIN PRN
Status: DISCONTINUED | OUTPATIENT
Start: 2025-03-06 | End: 2025-03-18 | Stop reason: HOSPADM

## 2025-03-06 RX ORDER — GABAPENTIN 300 MG/1
300 CAPSULE ORAL 3 TIMES DAILY
Status: DISCONTINUED | OUTPATIENT
Start: 2025-03-06 | End: 2025-03-09

## 2025-03-06 RX ORDER — SODIUM CHLORIDE 0.9 % (FLUSH) 0.9 %
5-40 SYRINGE (ML) INJECTION PRN
Status: DISCONTINUED | OUTPATIENT
Start: 2025-03-06 | End: 2025-03-18 | Stop reason: HOSPADM

## 2025-03-06 RX ORDER — ONDANSETRON 2 MG/ML
4 INJECTION INTRAMUSCULAR; INTRAVENOUS EVERY 6 HOURS PRN
Status: DISCONTINUED | OUTPATIENT
Start: 2025-03-06 | End: 2025-03-18 | Stop reason: HOSPADM

## 2025-03-06 RX ORDER — AMIODARONE HYDROCHLORIDE 200 MG/1
100 TABLET ORAL DAILY
Status: DISCONTINUED | OUTPATIENT
Start: 2025-03-06 | End: 2025-03-18 | Stop reason: HOSPADM

## 2025-03-06 RX ORDER — ONDANSETRON 4 MG/1
4 TABLET, ORALLY DISINTEGRATING ORAL EVERY 8 HOURS PRN
Status: DISCONTINUED | OUTPATIENT
Start: 2025-03-06 | End: 2025-03-18 | Stop reason: HOSPADM

## 2025-03-06 RX ORDER — SODIUM CHLORIDE 0.9 % (FLUSH) 0.9 %
5-40 SYRINGE (ML) INJECTION EVERY 12 HOURS SCHEDULED
Status: DISCONTINUED | OUTPATIENT
Start: 2025-03-06 | End: 2025-03-18 | Stop reason: HOSPADM

## 2025-03-06 RX ORDER — WARFARIN SODIUM 5 MG/1
5 TABLET ORAL
Status: ACTIVE | OUTPATIENT
Start: 2025-03-06 | End: 2025-03-07

## 2025-03-06 RX ORDER — CALCITRIOL 0.25 UG/1
0.25 CAPSULE, LIQUID FILLED ORAL DAILY
Status: DISCONTINUED | OUTPATIENT
Start: 2025-03-06 | End: 2025-03-12

## 2025-03-06 RX ORDER — ESCITALOPRAM OXALATE 10 MG/1
20 TABLET ORAL DAILY
Status: DISCONTINUED | OUTPATIENT
Start: 2025-03-06 | End: 2025-03-14

## 2025-03-06 RX ORDER — SODIUM CHLORIDE 9 MG/ML
INJECTION, SOLUTION INTRAVENOUS PRN
Status: DISCONTINUED | OUTPATIENT
Start: 2025-03-06 | End: 2025-03-18 | Stop reason: HOSPADM

## 2025-03-06 RX ORDER — HYDRALAZINE HYDROCHLORIDE 20 MG/ML
10 INJECTION INTRAMUSCULAR; INTRAVENOUS ONCE
Status: COMPLETED | OUTPATIENT
Start: 2025-03-06 | End: 2025-03-06

## 2025-03-06 RX ORDER — IOPAMIDOL 755 MG/ML
120 INJECTION, SOLUTION INTRAVASCULAR
Status: COMPLETED | OUTPATIENT
Start: 2025-03-06 | End: 2025-03-06

## 2025-03-06 RX ORDER — SODIUM BICARBONATE 650 MG/1
650 TABLET ORAL 2 TIMES DAILY
Status: DISCONTINUED | OUTPATIENT
Start: 2025-03-06 | End: 2025-03-11

## 2025-03-06 RX ORDER — ENOXAPARIN SODIUM 100 MG/ML
40 INJECTION SUBCUTANEOUS DAILY
Status: DISCONTINUED | OUTPATIENT
Start: 2025-03-06 | End: 2025-03-06

## 2025-03-06 RX ORDER — DEXTROSE MONOHYDRATE AND SODIUM CHLORIDE 5; .45 G/100ML; G/100ML
INJECTION, SOLUTION INTRAVENOUS CONTINUOUS
Status: DISCONTINUED | OUTPATIENT
Start: 2025-03-06 | End: 2025-03-08

## 2025-03-06 RX ORDER — LEVOTHYROXINE SODIUM 100 UG/1
100 TABLET ORAL DAILY
Status: DISCONTINUED | OUTPATIENT
Start: 2025-03-06 | End: 2025-03-18 | Stop reason: HOSPADM

## 2025-03-06 RX ADMIN — SULFUR HEXAFLUORIDE 2 ML: 60.7; .19; .19 INJECTION, POWDER, LYOPHILIZED, FOR SUSPENSION INTRAVENOUS; INTRAVESICAL at 16:10

## 2025-03-06 RX ADMIN — IOPAMIDOL 120 ML: 755 INJECTION, SOLUTION INTRAVENOUS at 13:24

## 2025-03-06 RX ADMIN — HYDRALAZINE HYDROCHLORIDE 10 MG: 20 INJECTION INTRAMUSCULAR; INTRAVENOUS at 14:24

## 2025-03-06 RX ADMIN — TIOTROPIUM BROMIDE AND OLODATEROL 2 PUFF: 3.124; 2.736 SPRAY, METERED RESPIRATORY (INHALATION) at 19:17

## 2025-03-06 NOTE — ED NOTES
ED TO INPATIENT SBAR HANDOFF    Patient Name: Leny Stone   : 1950  74 y.o.   Family/Caregiver Present: Yes  Code Status Order: Full Code    C-SSRS:    Sitter No  Restraints:         Situation  Chief Complaint:   Chief Complaint   Patient presents with    Altered Mental Status     Per family, pt glucose 55 upon EMS arrival      Patient Diagnosis: Altered mental status [R41.82]     Brief Description of Patient's Condition: Pt presented to ED by EMS for altered mental status and hypoglycemia. Code Stroke called upon arrival at ED.  Mental Status: disoriented  Arrived from: home    Imaging:   CT ABDOMEN PELVIS W IV CONTRAST Additional Contrast? None   Final Result   No abdominal or pelvic acute or suspicious process is seen.       Previous cholecystectomy, splenectomy and likely appendectomy.       Inferior vena cava [place] filter.       Bilateral lower lungs mild subsegmental atelectasis.        All CT scans are performed using dose optimization techniques as appropriate to the performed exam and include    at least one of the following: Automated exposure control, adjustment of the mA and/or kV according to size, and the use of iterative reconstruction technique.        ______________________________________    Electronically signed by: ALICIA BOONE M.D.   Date:     2025   Time:    13:49       CT CHEST W CONTRAST   Final Result   1.  No acute findings. Chronic changes as described.        All CT scans are performed using dose optimization techniques as appropriate to the performed exam and include    at least one of the following: Automated exposure control, adjustment of the mA and/or kV according to size, and the use of iterative reconstruction technique.        ______________________________________    Electronically signed by: YOAN PAVON M.D.   Date:     2025   Time:    13:49       CT BRAIN PERFUSION   Final Result       No acute infarct or ischemia.        All CT scans are performed  Yes  Interval: Reassessment  Level of Consciousness (1a): Alert  LOC Questions (1b): Answers one correctly  LOC Commands (1c): Performs one task correctly  Best Gaze (2): Normal  Visual (3): No visual loss  Facial Palsy (4): Normal symmetrical movement  Motor Arm, Left (5a): No drift  Motor Arm, Right (5b): No drift  Motor Leg, Left (6a): Some effort against gravity  Motor Leg, Right (6b): Some effort against gravity  Limb Ataxia (7): Absent  Sensory (8): Normal  Best Language (9): No aphasia  Dysarthria (10): Normal  Extinction and Inattention (11): No abnormality  Total: 6   Active LDA's:   Peripheral IV 03/06/25 Right Wrist (Active)   Site Assessment Clean, dry & intact 03/06/25 1246   Line Status Blood return noted;Brisk blood return;Flushed;Specimen collected 03/06/25 1246   Phlebitis Assessment No symptoms 03/06/25 1246   Infiltration Assessment 0 03/06/25 1246   Dressing Status New dressing applied 03/06/25 1246       Peripheral IV 03/06/25 Right Forearm (Active)   Site Assessment Clean, dry & intact 03/06/25 1319   Line Status Flushed;Brisk blood return;Blood return noted;Specimen collected 03/06/25 1319   Phlebitis Assessment No symptoms 03/06/25 1319   Infiltration Assessment 0 03/06/25 1319   Dressing Status New dressing applied 03/06/25 1319     Pertinent or High Risk Medications/Drips: no   If Yes, please provide details:    Blood Product Administration: no  If Yes, please provide details:    Sepsis Risk Score      Admitted with Sepsis? No    Recommendation  Incomplete orders: Signed and held and urine sample  Patient Belongings: bagged and with her family  Additional Comments: n/a  If any further questions, please call Sending RN at [phone].    Electronically signed by: Electronically signed by Ольга Coles RN (Dottie) on 3/6/2025 at 2:54 PM

## 2025-03-06 NOTE — H&P
(attention deficit hyperactivity disorder)     Allergic reaction to spider bite     Anemia     Anticoagulant long-term use     Anticoagulated     Anticoagulated on Coumadin     Anxiety     Arrhythmia     Asthmatic bronchitis without complication 01/13/2020    Ataxic gait     Atrial fibrillation ([facility])     Lyons's esophagus     Bowel obstruction ([facility])     Burn of abdomen wall, second degree, initial encounter 08/27/2018    Callus     Cardiac pacemaker     Central sleep apnea     Cerebral artery occlusion with cerebral infarction (HCC)     Cerebrovascular disease     CHF (congestive heart failure) (HCC)     Chronic kidney disease     Chronic obstructive lung disease ([facility]) 05/24/2022    CKD (chronic kidney disease) stage 2, GFR 60-89 ml/min     Coat's syndrome     Coat's syndrome     both eyes    Complex sleep apnea syndrome     COPD (chronic obstructive pulmonary disease) (HCC)     Depression     Diabetes mellitus type 2 in nonobese ([facility])     Diabetic nephropathy (HCC)     Disequilibrium     Dizziness     DVT (deep venous thrombosis) ([facility])     Exudative retinopathy     Fibromyalgia     Fibromyositis     Gastric ulcer     GERD (gastroesophageal reflux disease)     Headache 01/2023    History of gastric bypass     Hx of blood clots     Hx of lupus anticoagulant disorder     Hyperlipidemia 05/06/2019    Hypertension     Hyperthyroidism     Hypothyroidism     Intermittent claudication     Intestinal obstruction ([facility])     Iron deficiency     Irritable bowel syndrome     Left-sided weakness     Liver disease     Low vitamin D level     Lupus     Menopause     Neuropathy     Obesity     MER (obstructive sleep apnea)     Osteoarthritis     Osteoporosis     Other iron deficiency anemias     Palliative care patient 09/24/2020    Paroxysmal atrial fibrillation (HCC) 08/29/2022    Peripheral vascular disease     Pernicious anemia     PONV (postoperative nausea and vomiting)     PUPP (pruritic urticarial papules and plaques of  UPPER GASTROINTESTINAL ENDOSCOPY  07/2006    unremarkable s/p gastric bypass    UPPER GASTROINTESTINAL ENDOSCOPY  08/10/2015    Dr Khan    UPPER GASTROINTESTINAL ENDOSCOPY N/A 04/01/2016    Dr. Horvath:  H Pylori(-), HH, o/w normal    UPPER GASTROINTESTINAL ENDOSCOPY N/A 05/07/2021    Dr LAKHWINDER Horvath-Small hiatal hernia, previous gastric bypass surgery, gastritis    VENA CAVA FILTER PLACEMENT Right 2003       Home Medications:  Prior to Admission medications    Medication Sig Start Date End Date Taking? Authorizing Provider   Magic Mouthwash (MIRACLE MOUTHWASH) Swish and spit 5 mLs 4 times daily as needed for Irritation 2/21/25   Regina Curtis MD   Semaglutide,0.25 or 0.5MG/DOS, 2 MG/3ML SOPN Inject 0.5 mg into the skin every 7 days 2/21/25   Regina Curtis MD   metoclopramide (REGLAN) 5 MG tablet Take 1 tablet by mouth 4 times daily (before meals and nightly) 2/20/25 5/21/25  Irasema Rey APRN - CNP   ARIPiprazole (ABILIFY) 5 MG tablet Take 1 tablet by mouth daily 2/17/25   Regina Curtis MD   levothyroxine (SYNTHROID) 100 MCG tablet Take 1 tablet by mouth daily 2/12/25   Regina Curtis MD   sodium bicarbonate 650 MG tablet Take 1 tablet by mouth 2 times daily 2/11/25   Regina Curtis MD   baclofen (LIORESAL) 10 MG tablet Take 1 tablet by mouth 3 times daily 12/31/24   Regina Curtis MD   montelukast (SINGULAIR) 10 MG tablet Take 1 tablet by mouth nightly 12/31/24   Regina Curtis MD   warfarin (COUMADIN) 5 MG tablet TAKE 1 TABLET EVERY DAY 12/31/24   Regina Curtis MD   sucralfate (CARAFATE) 1 GM tablet TAKE 1 TABLET FOUR TIMES DAILY 12/31/24   Regina Curtis MD   amiodarone (CORDARONE) 200 MG tablet Take 0.5 tablets by mouth daily 12/30/24   Itzel Blunt APRN   amLODIPine (NORVASC) 5 MG tablet Take 1 tablet by mouth daily 12/30/24   Itzel Blunt APRN   escitalopram (LEXAPRO) 20 MG tablet Take 1 tablet by mouth daily 12/30/24   Regina Curtis MD   metoprolol  arteriovenous malformation.  There is multilevel disc desiccation and degenerative change involving posterior disc osteophyte complexes, facet joint hypertrophy, and uncovertebral joint hypertrophy resulting in various degrees of central canal and neural foraminal stenosis.       Motion degraded study. 1. No extracranial carotid or vertebral artery stenosis. 2. No large vessel occlusion or high grade stenosis within the [place].  All CT scans are performed using dose optimization techniques as appropriate to the performed exam and include at least one of the following: Automated exposure control, adjustment of the mA and/or kV according to size, and the use of iterative reconstruction technique.  ______________________________________ Electronically signed by: KATE GREGORY M.D. Date:     03/06/2025 Time:    13:51     CT BRAIN PERFUSION    Result Date: 3/6/2025  EXAMINATION: COMPUTED TOMOGRAPHY PERFUSION IMAGING OF THE HEAD WITH CONTRAST.  HISTORY: Stroke.  TECHNIQUE: CT perfusion of the brain was performed with intravenous contrast using a separate data acquisition. The data was transmitted to a separate workstation for processing by independent software to produce automated calculations of the estimated cerebral blood flow and Tmax. IV Contrast: Administered. CT Dose Reduction Techniques Employed: Yes  COMPARISON: CT head and CTA head and neck 03/06/2025  FINDINGS:  CT PERFUSION:  Estimated ischemic core volume (rCBF < 0.3): 0 mL Estimated hypoperfusion volume (Tmax > 6 sec): 0 mL Mismatch volume: 0 mL Mismatch ratio: NA        No acute infarct or ischemia.  All CT scans are performed using dose optimization techniques as appropriate to the performed exam and include at least one of the following: Automated exposure control, adjustment of the mA and/or kV according to size, and the use of iterative reconstruction technique.  ______________________________________ Electronically signed by: AGUEDA PALACIO D.O.

## 2025-03-06 NOTE — ED NOTES
Code Stroke called by Dr Mccray @ 3694    Announced over PA at 1244     Notified the Stroke Coordinator @ 8772    Paged Dr Arnett with Neurology @ 7479    Patient in CT @ 1243    LKW at 1800 last night

## 2025-03-06 NOTE — ED NOTES
[facility] 3 RAMON/VAS/MED  eMERGENCY dEPARTMENT eNCOUnter      Pt Name: Leny Stone  MRN: [id number]  Birthdate 1950  Date of evaluation: 3/6/2025  Provider: BOAZ PUENTES MD    CHIEF COMPLAINT       Chief Complaint   Patient presents with    Altered Mental Status     Per family, pt glucose 55 upon EMS arrival          HISTORY OF PRESENT ILLNESS   (Location/Symptom, Timing/Onset,Context/Setting, Quality, Duration, Modifying Factors, Severity)  Note limiting factors.   Leny Stone is a 74 y.o. female who presents to the emergency department      74-year-old female who has history significant for CHF, diabetes, hypertension, DVT in the past prescribed Coumadin.  Went to sleep at her normal last night.  Son-in-law came to see her at 9 AM in the morning and she was altered.  Patient somewhat agitated, complaining of pain all over, no recent fall noted.  Stroke alert called for acute altered mental status.          NursingNotes were reviewed.    REVIEW OF SYSTEMS    (2-9 systems for level 4, 10 or more for level 5)     Review of Systems   Unable to perform ROS: Mental status change   Constitutional:  Positive for fatigue.   Neurological:  Positive for weakness.   All other systems reviewed and are negative.           PAST MEDICALHISTORY     Past Medical History:   Diagnosis Date    Abdominal pain     Abnormal EKG     Acute sinusitis     Acute superficial venous thrombosis of left lower extremity     ADHD (attention deficit hyperactivity disorder)     Allergic reaction to spider bite     Anemia     Anticoagulant long-term use     Anticoagulated     Anticoagulated on Coumadin     Anxiety     Arrhythmia     Asthmatic bronchitis without complication 01/13/2020    Ataxic gait     Atrial fibrillation (HCC)     Lyons's esophagus     Bowel obstruction (HCC)     Burn of abdomen wall, second degree, initial encounter 08/27/2018    Callus     Cardiac pacemaker     Central sleep apnea     Cerebral artery occlusion with cerebral  COLONOSCOPY  02/2010    negative    COLONOSCOPY  02/22/2010    Dr Mcdaniel    COLONOSCOPY  04/01/2016    Dr LAKHWINDER Horvath-internal hemorrhoids, 5 yr recall    COLONOSCOPY N/A 05/07/2021    Dr LAKHWINDER Horvath-Significant looping/tortuosity throughout the left colon, BE=Normal    DILATATION, ESOPHAGUS      EYE SURGERY      Cyst on Right eye    EYE SURGERY      GASTRIC BYPASS SURGERY      GASTRIC BYPASS SURGERY      HERNIA REPAIR      HYSTERECTOMY (CERVIX STATUS UNKNOWN)  1974    Complete    HYSTERECTOMY, TOTAL ABDOMINAL (CERVIX REMOVED)      INCONTINENCE SURGERY      Bladder Sling    INFECTED SKIN DEBRIDEMENT Right     dog bite right forearm    JOINT REPLACEMENT      KNEE ARTHROPLASTY  07/2022, 07/2018    OTHER SURGICAL HISTORY      IVC filter    PACEMAKER INSERTION      PACEMAKER PLACEMENT      medtronic    NE ARTHRP KNE CONDYLE&PLATU MEDIAL&LAT COMPARTMENTS Right 03/26/2018    TOTAL KNEE REPLACEMENT COMPLEX PRIMARY performed by Fernando Coy MD at [facility] OR    SMALL INTESTINE SURGERY      SMALL INTESTINE SURGERY      SMALL INTESTINE SURGERY      SPLENECTOMY      KRISTEL AND BSO (CERVIX REMOVED) Bilateral 1974    age 24    TONSILLECTOMY AND ADENOIDECTOMY      TOTAL KNEE ARTHROPLASTY Left 07/19/2022    UPPER GASTROINTESTINAL ENDOSCOPY  12/2011    gerd s/p gastric bypass    UPPER GASTROINTESTINAL ENDOSCOPY  02/2014    normal s/p gastric bypass    UPPER GASTROINTESTINAL ENDOSCOPY  02/2010    biopsy neg Barretts, chronic reflux esophagitis s/p gastric bypass    UPPER GASTROINTESTINAL ENDOSCOPY  07/2006    unremarkable s/p gastric bypass    UPPER GASTROINTESTINAL ENDOSCOPY  08/10/2015    Dr Khan    UPPER GASTROINTESTINAL ENDOSCOPY N/A 04/01/2016    Dr. Horvath:  H Pylori(-), HH, o/w normal    UPPER GASTROINTESTINAL ENDOSCOPY N/A 05/07/2021    Dr LAKHWINDER Horvath-Small hiatal hernia, previous gastric bypass surgery, gastritis    VENA CAVA FILTER PLACEMENT Right 2003         CURRENT MEDICATIONS     Previous Medications    ALCOHOL SWABS (DROPSAFE

## 2025-03-06 NOTE — ED PROVIDER NOTES
Electronically signed by: YOAN ORNELAS M.D.   Date:     03/06/2025   Time:    13:49       CT BRAIN PERFUSION   Final Result       No acute infarct or ischemia.        All CT scans are performed using dose optimization techniques as appropriate to the performed exam and include    at least one of the following: Automated exposure control, adjustment of the mA and/or kV according to size, and the use of iterative reconstruction technique.        ______________________________________    Electronically signed by: AGUEDA PALACIO D.O.   Date:     03/06/2025   Time:    13:52       CTA HEAD NECK W CONTRAST   Final Result       Motion degraded study.   1. No extracranial carotid or vertebral artery stenosis.   2. No large vessel occlusion or high grade stenosis within the [place].        All CT scans are performed using dose optimization techniques as appropriate to the performed exam and include    at least one of the following: Automated exposure control, adjustment of the mA and/or kV according to size, and the use of iterative reconstruction technique.        ______________________________________    Electronically signed by: KATE GREGORY M.D.   Date:     03/06/2025   Time:    13:51       CT HEAD WO CONTRAST   Final Result   1. No intracranial hemorrhage.   2. No acute findings.   3.  Limited examination.       Findings were communicated with Elver Mccray on 03/06/2025 at 1:14 p.m. by Yoan Ornelas MD.        All CT scans are performed using dose optimization techniques as appropriate to the performed exam and include    at least one of the following: Automated exposure control, adjustment of the mA and/or kV according to size, and the use of iterative reconstruction technique.        ______________________________________    Electronically signed by: YOAN ORNELAS M.D.   Date:     03/06/2025   Time:    13:08               LABS:  Labs Reviewed   CBC WITH AUTO DIFFERENTIAL - Abnormal; Notable for the

## 2025-03-06 NOTE — PROGRESS NOTES
Clinical Pharmacy Note    Leny Stone is a 74 y.o. female for whom pharmacy has been asked to manage warfarin therapy.     Reason for Admission: Altered Mental Status    Consulting Physician: CHANELLE Sanchez  Warfarin dose prior to admission: 2.5mg on TuSa &  5mg SuMoWeThFr  Warfarin indication: Afib/Hx of DVT  Target INR range: 2-3   Outpatient warfarin provider: Regina Curtis    Past Medical History:   Diagnosis Date    Abdominal pain     Abnormal EKG     Acute sinusitis     Acute superficial venous thrombosis of left lower extremity     ADHD (attention deficit hyperactivity disorder)     Allergic reaction to spider bite     Anemia     Anticoagulant long-term use     Anticoagulated     Anticoagulated on Coumadin     Anxiety     Arrhythmia     Asthmatic bronchitis without complication 01/13/2020    Ataxic gait     Atrial fibrillation (HCC)     Lyons's esophagus     Bowel obstruction ([facility])     Burn of abdomen wall, second degree, initial encounter 08/27/2018    Callus     Cardiac pacemaker     Central sleep apnea     Cerebral artery occlusion with cerebral infarction (HCC)     Cerebrovascular disease     CHF (congestive heart failure) ([facility])     Chronic kidney disease     Chronic obstructive lung disease (HCC) 05/24/2022    CKD (chronic kidney disease) stage 2, GFR 60-89 ml/min     Coat's syndrome     Coat's syndrome     both eyes    Complex sleep apnea syndrome     COPD (chronic obstructive pulmonary disease) (HCC)     Depression     Diabetes mellitus type 2 in nonobese (HCC)     Diabetic nephropathy (HCC)     Disequilibrium     Dizziness     DVT (deep venous thrombosis) ([facility])     Exudative retinopathy     Fibromyalgia     Fibromyositis     Gastric ulcer     GERD (gastroesophageal reflux disease)     Headache 01/2023    History of gastric bypass     Hx of blood clots     Hx of lupus anticoagulant disorder     Hyperlipidemia 05/06/2019    Hypertension     Hyperthyroidism     Hypothyroidism     Intermittent  Warfarin Dose   03/06/25 1.39 5 mg                                   Plan: Coumadin 5 mg po X1 tonight.  Daily PT/INR until stable within therapeutic range.     Thank you for the consult.     Electronically signed by Shabnam Hdz RPH on 3/6/2025 at 5:36 PM

## 2025-03-06 NOTE — ED TRIAGE NOTES
[facility] notified ED prior to their arrival. Pt was called in as AMS/hypoglycemia.

## 2025-03-07 ENCOUNTER — APPOINTMENT (OUTPATIENT)
Dept: MRI IMAGING | Age: 75
DRG: 069 | End: 2025-03-07
Payer: MEDICARE

## 2025-03-07 LAB
ANION GAP SERPL CALCULATED.3IONS-SCNC: 16 MMOL/L (ref 8–16)
BUN SERPL-MCNC: 15 MG/DL (ref 8–23)
CALCIUM SERPL-MCNC: 9 MG/DL (ref 8.8–10.2)
CHLORIDE SERPL-SCNC: 104 MMOL/L (ref 98–107)
CO2 SERPL-SCNC: 20 MMOL/L (ref 22–29)
CREAT SERPL-MCNC: 1.4 MG/DL (ref 0.5–0.9)
D DIMER PPP FEU-MCNC: >20 UG/ML FEU (ref 0–0.48)
EKG P AXIS: 16 DEGREES
EKG P-R INTERVAL: 138 MS
EKG Q-T INTERVAL: 486 MS
EKG QRS DURATION: 100 MS
EKG QTC CALCULATION (BAZETT): 489 MS
EKG T AXIS: -106 DEGREES
ERYTHROCYTE [DISTWIDTH] IN BLOOD BY AUTOMATED COUNT: 15.9 % (ref 11.5–14.5)
GLUCOSE BLD-MCNC: 113 MG/DL (ref 70–99)
GLUCOSE BLD-MCNC: 155 MG/DL (ref 70–99)
GLUCOSE BLD-MCNC: 65 MG/DL (ref 70–99)
GLUCOSE BLD-MCNC: 96 MG/DL (ref 70–99)
GLUCOSE BLD-MCNC: 97 MG/DL (ref 70–99)
GLUCOSE SERPL-MCNC: 139 MG/DL (ref 70–99)
HBA1C MFR BLD: 5.5 % (ref 4–5.6)
HCT VFR BLD AUTO: 40.2 % (ref 37–47)
HGB BLD-MCNC: 13.1 G/DL (ref 12–16)
INR PPP: 1.19 (ref 0.88–1.18)
MAGNESIUM SERPL-MCNC: 1.7 MG/DL (ref 1.6–2.4)
MCH RBC QN AUTO: 29.6 PG (ref 27–31)
MCHC RBC AUTO-ENTMCNC: 32.6 G/DL (ref 33–37)
MCV RBC AUTO: 90.7 FL (ref 81–99)
PERFORMED ON: ABNORMAL
PERFORMED ON: NORMAL
PERFORMED ON: NORMAL
PLATELET # BLD AUTO: 89 K/UL (ref 130–400)
POTASSIUM SERPL-SCNC: 3.5 MMOL/L (ref 3.5–5)
PROTHROMBIN TIME: 14.8 SEC (ref 12–14.6)
RBC # BLD AUTO: 4.43 M/UL (ref 4.2–5.4)
SODIUM SERPL-SCNC: 140 MMOL/L (ref 136–145)
T4 FREE SERPL-MCNC: 1.61 NG/DL (ref 0.93–1.7)
TROPONIN, HIGH SENSITIVITY: 36 NG/L (ref 0–14)
TROPONIN, HIGH SENSITIVITY: 37 NG/L (ref 0–14)
TROPONIN, HIGH SENSITIVITY: 38 NG/L (ref 0–14)
WBC # BLD AUTO: 9.7 K/UL (ref 4.8–10.8)

## 2025-03-07 PROCEDURE — 1200000000 HC SEMI PRIVATE

## 2025-03-07 PROCEDURE — 36415 COLL VENOUS BLD VENIPUNCTURE: CPT

## 2025-03-07 PROCEDURE — 70551 MRI BRAIN STEM W/O DYE: CPT

## 2025-03-07 PROCEDURE — 94760 N-INVAS EAR/PLS OXIMETRY 1: CPT

## 2025-03-07 PROCEDURE — 92610 EVALUATE SWALLOWING FUNCTION: CPT

## 2025-03-07 PROCEDURE — 2580000003 HC RX 258: Performed by: STUDENT IN AN ORGANIZED HEALTH CARE EDUCATION/TRAINING PROGRAM

## 2025-03-07 PROCEDURE — 97530 THERAPEUTIC ACTIVITIES: CPT

## 2025-03-07 PROCEDURE — 94640 AIRWAY INHALATION TREATMENT: CPT

## 2025-03-07 PROCEDURE — 83036 HEMOGLOBIN GLYCOSYLATED A1C: CPT

## 2025-03-07 PROCEDURE — 92523 SPEECH SOUND LANG COMPREHEN: CPT

## 2025-03-07 PROCEDURE — 97110 THERAPEUTIC EXERCISES: CPT

## 2025-03-07 PROCEDURE — 6360000002 HC RX W HCPCS: Performed by: NURSE PRACTITIONER

## 2025-03-07 PROCEDURE — 99223 1ST HOSP IP/OBS HIGH 75: CPT | Performed by: PSYCHIATRY & NEUROLOGY

## 2025-03-07 PROCEDURE — 83735 ASSAY OF MAGNESIUM: CPT

## 2025-03-07 PROCEDURE — 95816 EEG AWAKE AND DROWSY: CPT | Performed by: PSYCHIATRY & NEUROLOGY

## 2025-03-07 PROCEDURE — 84484 ASSAY OF TROPONIN QUANT: CPT

## 2025-03-07 PROCEDURE — 93005 ELECTROCARDIOGRAM TRACING: CPT | Performed by: NURSE PRACTITIONER

## 2025-03-07 PROCEDURE — 95816 EEG AWAKE AND DROWSY: CPT

## 2025-03-07 PROCEDURE — 85610 PROTHROMBIN TIME: CPT

## 2025-03-07 PROCEDURE — 80048 BASIC METABOLIC PNL TOTAL CA: CPT

## 2025-03-07 PROCEDURE — 97166 OT EVAL MOD COMPLEX 45 MIN: CPT

## 2025-03-07 PROCEDURE — 6370000000 HC RX 637 (ALT 250 FOR IP): Performed by: STUDENT IN AN ORGANIZED HEALTH CARE EDUCATION/TRAINING PROGRAM

## 2025-03-07 PROCEDURE — 82962 GLUCOSE BLOOD TEST: CPT

## 2025-03-07 PROCEDURE — 6370000000 HC RX 637 (ALT 250 FOR IP)

## 2025-03-07 PROCEDURE — 85379 FIBRIN DEGRADATION QUANT: CPT

## 2025-03-07 PROCEDURE — 99223 1ST HOSP IP/OBS HIGH 75: CPT | Performed by: INTERNAL MEDICINE

## 2025-03-07 PROCEDURE — 2500000003 HC RX 250 WO HCPCS

## 2025-03-07 PROCEDURE — 84439 ASSAY OF FREE THYROXINE: CPT

## 2025-03-07 PROCEDURE — 97162 PT EVAL MOD COMPLEX 30 MIN: CPT

## 2025-03-07 PROCEDURE — 85027 COMPLETE CBC AUTOMATED: CPT

## 2025-03-07 PROCEDURE — 6370000000 HC RX 637 (ALT 250 FOR IP): Performed by: NURSE PRACTITIONER

## 2025-03-07 RX ORDER — WARFARIN SODIUM 5 MG/1
5 TABLET ORAL
Status: COMPLETED | OUTPATIENT
Start: 2025-03-07 | End: 2025-03-07

## 2025-03-07 RX ORDER — ENOXAPARIN SODIUM 150 MG/ML
1 INJECTION SUBCUTANEOUS 2 TIMES DAILY
Status: DISCONTINUED | OUTPATIENT
Start: 2025-03-07 | End: 2025-03-12 | Stop reason: ALTCHOICE

## 2025-03-07 RX ORDER — ASPIRIN 81 MG/1
81 TABLET, CHEWABLE ORAL DAILY
Status: DISCONTINUED | OUTPATIENT
Start: 2025-03-07 | End: 2025-03-18 | Stop reason: HOSPADM

## 2025-03-07 RX ORDER — OXYCODONE HYDROCHLORIDE 5 MG/1
2.5 TABLET ORAL EVERY 4 HOURS PRN
Status: DISCONTINUED | OUTPATIENT
Start: 2025-03-07 | End: 2025-03-18 | Stop reason: HOSPADM

## 2025-03-07 RX ORDER — NITROGLYCERIN 0.4 MG/1
0.4 TABLET SUBLINGUAL EVERY 5 MIN PRN
Status: DISCONTINUED | OUTPATIENT
Start: 2025-03-07 | End: 2025-03-18 | Stop reason: HOSPADM

## 2025-03-07 RX ADMIN — SODIUM BICARBONATE 650 MG: 650 TABLET ORAL at 20:31

## 2025-03-07 RX ADMIN — METOCLOPRAMIDE 5 MG: 10 TABLET ORAL at 16:51

## 2025-03-07 RX ADMIN — DEXTROSE AND SODIUM CHLORIDE: 5; .45 INJECTION, SOLUTION INTRAVENOUS at 02:00

## 2025-03-07 RX ADMIN — ENOXAPARIN SODIUM 105 MG: 150 INJECTION SUBCUTANEOUS at 20:44

## 2025-03-07 RX ADMIN — PANTOPRAZOLE SODIUM 40 MG: 40 TABLET, DELAYED RELEASE ORAL at 16:52

## 2025-03-07 RX ADMIN — SODIUM CHLORIDE, PRESERVATIVE FREE 10 ML: 5 INJECTION INTRAVENOUS at 00:23

## 2025-03-07 RX ADMIN — OXYCODONE 2.5 MG: 5 TABLET ORAL at 18:29

## 2025-03-07 RX ADMIN — ENOXAPARIN SODIUM 105 MG: 150 INJECTION SUBCUTANEOUS at 11:22

## 2025-03-07 RX ADMIN — METOCLOPRAMIDE 5 MG: 10 TABLET ORAL at 11:38

## 2025-03-07 RX ADMIN — SUCRALFATE 1 G: 1 TABLET ORAL at 11:38

## 2025-03-07 RX ADMIN — SUCRALFATE 1 G: 1 TABLET ORAL at 20:31

## 2025-03-07 RX ADMIN — SODIUM BICARBONATE 650 MG: 650 TABLET ORAL at 11:22

## 2025-03-07 RX ADMIN — WARFARIN SODIUM 5 MG: 5 TABLET ORAL at 18:20

## 2025-03-07 RX ADMIN — SUCRALFATE 1 G: 1 TABLET ORAL at 16:52

## 2025-03-07 RX ADMIN — SODIUM CHLORIDE, PRESERVATIVE FREE 10 ML: 5 INJECTION INTRAVENOUS at 20:43

## 2025-03-07 RX ADMIN — ROSUVASTATIN CALCIUM 40 MG: 20 TABLET, FILM COATED ORAL at 11:21

## 2025-03-07 RX ADMIN — ESCITALOPRAM OXALATE 20 MG: 10 TABLET ORAL at 11:20

## 2025-03-07 RX ADMIN — AMIODARONE HYDROCHLORIDE 100 MG: 200 TABLET ORAL at 11:20

## 2025-03-07 RX ADMIN — METOCLOPRAMIDE 5 MG: 10 TABLET ORAL at 20:31

## 2025-03-07 RX ADMIN — ASPIRIN 81 MG: 81 TABLET, CHEWABLE ORAL at 11:20

## 2025-03-07 RX ADMIN — TIOTROPIUM BROMIDE AND OLODATEROL 2 PUFF: 3.124; 2.736 SPRAY, METERED RESPIRATORY (INHALATION) at 11:07

## 2025-03-07 RX ADMIN — SODIUM CHLORIDE, PRESERVATIVE FREE 10 ML: 5 INJECTION INTRAVENOUS at 13:37

## 2025-03-07 ASSESSMENT — ENCOUNTER SYMPTOMS
BLOOD IN STOOL: 0
WHEEZING: 0
COUGH: 0
NAUSEA: 0
SORE THROAT: 0
SHORTNESS OF BREATH: 1
CONSTIPATION: 0
SINUS PAIN: 0
VOMITING: 0
ABDOMINAL DISTENTION: 0
ABDOMINAL PAIN: 0
TROUBLE SWALLOWING: 0
DIARRHEA: 0

## 2025-03-07 ASSESSMENT — PAIN SCALES - WONG BAKER: WONGBAKER_NUMERICALRESPONSE: NO HURT

## 2025-03-07 ASSESSMENT — PAIN SCALES - GENERAL: PAINLEVEL_OUTOF10: 7

## 2025-03-07 ASSESSMENT — PAIN DESCRIPTION - LOCATION: LOCATION: HEAD;CHEST
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
## 2025-03-07 NOTE — CONSULTS
[facility] Consult  ?  ?  Patient: Leny Stone  MR#: [id number]  Account Number: [id number]   Room: [address] [place] [address]-   YOB: 1950  Date of Progress Note: 3/7/2025  Time of Note 12:59 PM  Attending Physician: Lee Minaya MD  Consulting Physician: Fabiano Sheffield M.D.  ?  ?  CHIEF COMPLAINT: Altered mental status  ?  HISTORY OF PRESENT ILLNESS:   This 74 y.o. female who presents with altered mental status through the ED on 3/6.  She was brought in by her family.  She was reportedly able to talk and speak some but was not answering questions appropriately.  Came in through the ED and a code stroke was called.  CT brain perfusion and CTA with CT of the brain were all unremarkable.  Patient is on Coumadin but her INR was subtherapeutic.  She has a history of recurrent DVTs and what sounds like possible lupus anticoagulant.  There is a family history of hypercoagulability.  Atrial fibrillation is listed as a medical problem as well.  History is obtained from family at bedside as well as from the chart.  Patient also has been describing chest pain which appears more musculoskeletal.  Blood pressure has been elevated since admission.  Patient reportedly noncompliant with medications in the past.  Also has CHF, CKD, diabetes and hypertension.  REVIEW OF SYSTEMS:  Constitutional - No fever or chills. No diaphoresis or significant fatigue.  HENT - No tinnitus or significant hearing loss.  Eyes - no sudden vision change or eye pain  Respiratory - no significant shortness of breath or cough  Cardiovascular - no chest pain No palpitations or significant leg swelling  Gastrointestinal - no abdominal swelling or pain.   Genitourinary - No difficulty urinating, dysuria  Musculoskeletal - no back pain or myalgia.  Skin - no color change or rash  Neurologic - No seizures. No lateralizing weakness.  Hematologic - no easy bruising or excessive bleeding.  Psychiatric - no severe anxiety or nervousness.   All other review of

## 2025-03-07 NOTE — PROGRESS NOTES
Clinical Pharmacy Note    Warfarin consult follow-up    Recent Labs     03/07/25  0912   INR 1.19*     Recent Labs     03/06/25  1303 03/07/25  0912   HGB 11.9* 13.1   HCT 37.2 40.2   PLT 88* 89*       Current warfarin drug-drug interactions:   Amiodarone-Monitor patients extra closely for evidence of increased anticoagulant effects if amiodarone is initiated/dose increased, or decreased effects if amiodarone is discontinued/dose decreased, though due to the long half-life of amiodarone, any interaction may persist for several days/weeks following any dose decrease or discontinuation.   Carafate-Specifically, sucralfate may decrease the absorption of Vitamin K Antagonists.   Crestor-HMG-CoA Reductase Inhibitors (Statins) may enhance the anticoagulant effect of Vitamin K Antagonists.   Lexapro- Closely monitor patients for clinical and laboratory evidence of bleeding if these agents are combined.  Synthroid-Monitor for increased anticoagulant effects of vitamin K antagonists if a thyroid product is initiated/dose increased, or decreased effects if a thyroid product is discontinued/dose decreased.     Lovenox 1mg/kg q 12 hours ordered         Date INR Warfarin Dose   03/06/25 1.39 5 mg ordered but not taken   03/07/25   1.19 5 mg                                                  Notes:                   Give coumadin 5mg x 1 dose tonight. Daily PT/INR until stable within therapeutic range.     ALEIDA CARNEY PHARM D, 3/7/2025, 10:36 AM

## 2025-03-07 NOTE — PROGRESS NOTES
[facility]      Progress Note    Patient:  Leny Stone  YOB: 1950  Date of Service: 3/7/2025  MRN: [id number]   Acct: [id number]   Primary Care Physician: Regina Curtis MD  Advance Directive: Full Code  Admit Date: 3/6/2025       Hospital Day: 1    Portions of this note have been copied forward, however, updated to reflect the most current clinical status of this patient.     CHIEF COMPLAINT Altered Mental Status     SUBJECTIVE:  Ms. Stone was resting in bed this morning. A&O to self only, confused at times. Reported shortness of breath and chest pain. Reported right leg pain and neck stiffness.       CUMULATIVE HOSPITAL COURSE:   The patient is a 74 y.o. female with PMH of Afib on Coumadin, lupus anticoagulant disorder, HTN, CHF, CKD 3, T2DM, hypothyroidism and anxiety who presented to [facility] ED for evaluation of altered mental status. Reportedly was brought in via EMS after son-in-law found her to have altered mental status. CODE stroke was called on arrival to ED. Per family patient does have history of medication noncompliance at times. Patient is supposed to wear a CPAP at night although she does not wear it sometimes. Patient was found to be lethargic on admission and only answered \"yeah\" or \"okay\". Workup in ED revealed CT head with no acute intracranial hemorrhage no acute findings; CTA head neck with no extracranial carotid or vertebral artery stenosis, no large vessel occlusion or high-grade stenosis within the [place] of [place]; CT brain perfusion with no acute infarct or ischemia; CT chest with no acute findings; CT abdomen and pelvis with no abdominal pelvic acute or suspicious process, inferior vena cava Tessa filter third, bilateral lower lungs mild subsegmental atelectasis; WBC 7.2, H&H 11.9/37.2, platelets 88, glucose 68-->163-->116, troponin 29, lactic acid 1.8, creatinine 1.6, BUN 19, GFR 33. Patient was admitted to hospital medicine for further evaluation with neurology  diaphoretic.   HENT:      Head: Normocephalic and atraumatic.      Right Ear: External ear normal.      Left Ear: External ear normal.      Nose: Nose normal. No congestion or rhinorrhea.      Mouth/Throat:      Mouth: Mucous membranes are moist.      Pharynx: Oropharynx is clear.   Eyes:      General: No scleral icterus.     Extraocular Movements: Extraocular movements intact.      Conjunctiva/sclera: Conjunctivae normal.   Cardiovascular:      Rate and Rhythm: Normal rate and regular rhythm.      Pulses: Normal pulses.      Heart sounds: Normal heart sounds. No murmur heard.     No friction rub. No gallop.   Pulmonary:      Effort: Pulmonary effort is normal. No respiratory distress.      Breath sounds: Normal breath sounds. No wheezing, rhonchi or rales.   Abdominal:      General: Abdomen is flat. Bowel sounds are normal. There is no distension.      Palpations: Abdomen is soft.      Tenderness: There is no abdominal tenderness.   Musculoskeletal:         General: No swelling. Normal range of motion.      Cervical back: Normal range of motion and neck supple.      Right lower leg: No edema.      Left lower leg: No edema.   Skin:     General: Skin is warm and dry.      Coloration: Skin is not jaundiced.      Findings: No erythema, lesion or rash.   Neurological:      General: No focal deficit present.      Mental Status: She is alert. Mental status is at baseline. She is disoriented.      Cranial Nerves: No cranial nerve deficit.      Sensory: No sensory deficit.      Motor: No weakness.      Comments: Alert and oriented to self only, confused at times  Somnolent appearing   Right facial droop noted    Psychiatric:         Mood and Affect: Mood normal.         Behavior: Behavior normal.         Thought Content: Thought content normal.         Judgment: Judgment normal.            Medications:      sodium chloride      dextrose 5 % and 0.45 % NaCl 50 mL/hr at 03/07/25 0200      aspirin  81 mg Oral Daily

## 2025-03-07 NOTE — CONSULTS
[place].    Has pet dog.     Social Determinants of Health     Financial Resource Strain: Low Risk  (11/23/2024)    Overall Financial Resource Strain (CARDIA)     Difficulty of Paying Living Expenses: Not hard at all   Food Insecurity: No Food Insecurity (3/6/2025)    Hunger Vital Sign     Worried About Running Out of Food in the Last Year: Never true     Ran Out of Food in the Last Year: Never true   Transportation Needs: No Transportation Needs (3/6/2025)    PRAPARE - Transportation     Lack of Transportation (Medical): No     Lack of Transportation (Non-Medical): No   Physical Activity: Inactive (5/5/2024)    Exercise Vital Sign     Days of Exercise per Week: 0 days     Minutes of Exercise per Session: 0 min   Stress: No Stress Concern Present (4/14/2021)    [facility] - Occupational Stress Questionnaire     Feeling of Stress : Only a little   Social Connections: Unknown (10/10/2023)    [facility]    Family and Community Support     Help with Day-to-Day Activities: Not on file     Lonely or Isolated: Not on file   Intimate Partner Violence: Unknown (10/10/2023)    [facility] from [facility], [facility]    Abuse Screen     Unsafe at Home or Work/School: Not on file     Feels Threatened by Someone?: Not on file     Does Anyone Keep You from Contacting Others or Doint Things Outside the Home?: Not on file     Physical Sign of Abuse Present: Not on file   Housing Stability: Low Risk  (3/6/2025)    Housing Stability Vital Sign     Unable to Pay for Housing in the Last Year: No     Number of Times Moved in the Last Year: 0     Homeless in the Last Year: No       Family History:       Problem Relation Age of Onset    Uterine Cancer Mother     Cervical Cancer Mother     Coronary Art Dis Mother     Arthritis Mother     Bleeding Prob Mother     Cancer Mother     Clotting Disorder Mother     Hearing Loss  Regina WILLS MD   warfarin (COUMADIN) 5 MG tablet TAKE 1 TABLET EVERY DAY 12/31/24   Regina Curtis MD   sucralfate (CARAFATE) 1 GM tablet TAKE 1 TABLET FOUR TIMES DAILY 12/31/24   Regina Curtis MD   amiodarone (CORDARONE) 200 MG tablet Take 0.5 tablets by mouth daily 12/30/24   Itzel Blunt APRN   amLODIPine (NORVASC) 5 MG tablet Take 1 tablet by mouth daily 12/30/24   Itzel Blunt APRN   escitalopram (LEXAPRO) 20 MG tablet Take 1 tablet by mouth daily 12/30/24   Regina Curtis MD   metoprolol tartrate (LOPRESSOR) 25 MG tablet Take 1 tablet by mouth 2 times daily 10/18/24   Regina Curtis MD   bumetanide (BUMEX) 1 MG tablet TAKE 1 TABLET EVERY DAY 9/26/24   Regina Curtis MD   pantoprazole (PROTONIX) 40 MG tablet TAKE 1 TABLET TWICE DAILY WITH MEALS 9/26/24   Regina Curtis MD   calcitRIOL (ROCALTROL) 0.25 MCG capsule TAKE 1 CAPSULE EVERY DAY 9/26/24   Regina Curtis MD   rosuvastatin (CRESTOR) 5 MG tablet TAKE 1 TABLET EVERY DAY 9/26/24   Regina Curtis MD   Alcohol Swabs (DROPSAFE ALCOHOL PREP) 70 % PADS USE AS DIRECTED THREE TIMES DAILY 7/22/24   Regina Curtis MD   benzocaine (ORAJEL) 20 % GEL mucosal gel Take 1 Application by mouth 2 times daily as needed for Pain 7/18/24   Regina Curtis MD   gabapentin (NEURONTIN) 300 MG capsule TAKE 1 CAPSULE THREE TIMES DAILY 6/3/24 2/14/25  Regina Curtis MD   potassium chloride (KLOR-CON) 10 MEQ extended release tablet TAKE 1 TABLET EVERY DAY 6/3/24   Regina Curtis MD   ferrous gluconate (FERGON) 240 (27 Fe) MG tablet Take 1 tablet by mouth 2 times daily (before meals) 5/30/24   Regina Curtis MD   fexofenadine (ALLEGRA) 180 MG tablet Take 1 tablet by mouth daily 5/30/24   Regina Curtis MD   aspirin 81 MG chewable tablet Take 1 tablet by mouth daily 2/14/24   Lee Minaya MD   vitamin D (ERGOCALCIFEROL) 1.25 MG (34449 UT) CAPS capsule Take 1 capsule by mouth once a week 10/4/23   Regina Curtis MD    tiotropium-olodaterol (STIOLTO RESPIMAT) 2.5-2.5 MCG/ACT AERS Inhale 2 puffs into the lungs daily 10/4/23   Regina Curtis MD   Blood Glucose Calibration (TRUE METRIX LEVEL 1) Low SOLN USE AS DIRECTED  WITH  GLUCOSE  METER 9/27/23   Regina Curtis MD   lisinopril (PRINIVIL;ZESTRIL) 10 MG tablet Take 1 tablet by mouth daily 8/30/23   Martinez Virgen MD   clobetasol (TEMOVATE) 0.05 % cream Apply topically 2 times daily. 5/5/23   Regina Curtis MD   calcipotriene (DOVONEX) 0.005 % cream Use topically as needed 5/5/23   Regina Curtis MD   Hydrocortisone, Perianal, (PROCTO-CASPER) 1 % cream Apply topically 2 times daily. 5/5/23   Regina Curtis MD   docusate sodium (COLACE) 100 MG capsule TAKE 1 CAPSULE BY MOUTH TWICE DAILY AS NEEDED FOR CONSTIPATION 7/19/22   Martinez Virgen MD   CPAP Machine MISC Inhale 1 each into the lungs nightly    Martinez Virgen MD   Multiple Vitamins-Minerals (CENTRUM ADULTS) TABS Take 1 tablet by mouth daily    ProviderMartinez MD       Current Meds:   aspirin  81 mg Oral Daily    enoxaparin  1 mg/kg SubCUTAneous BID    warfarin  5 mg Oral Once    sodium chloride flush  5-40 mL IntraVENous 2 times per day    amiodarone  100 mg Oral Daily    [Held by provider] ARIPiprazole  5 mg Oral Daily    calcitRIOL  0.25 mcg Oral Daily    escitalopram  20 mg Oral Daily    [Held by provider] gabapentin  300 mg Oral TID    levothyroxine  100 mcg Oral Daily    metoclopramide  5 mg Oral 4x Daily AC & HS    pantoprazole  40 mg Oral BID AC    rosuvastatin  40 mg Oral Daily    sodium bicarbonate  650 mg Oral BID    sucralfate  1 g Oral 4x Daily AC & HS    tiotropium-olodaterol  2 puff Inhalation Daily    warfarin placeholder: dosing by pharmacy   Oral RX Placeholder       Current Infused Meds:   sodium chloride      dextrose 5 % and 0.45 % NaCl 50 mL/hr at 03/07/25 0200       Physical Exam:  Vitals:    03/07/25 1600   BP: (!) 183/85   Pulse: 78   Resp: 16   Temp: 98.8 °F

## 2025-03-07 NOTE — PLAN OF CARE
Problem: Chronic Conditions and Co-morbidities  Goal: Patient's chronic conditions and co-morbidity symptoms are monitored and maintained or improved  Outcome: Progressing     Problem: Discharge Planning  Goal: Discharge to home or other facility with appropriate resources  Outcome: Progressing     Problem: Skin/Tissue Integrity  Goal: Skin integrity remains intact  Description: 1.  Monitor for areas of redness and/or skin breakdown  2.  Assess vascular access sites hourly  3.  Every 4-6 hours minimum:  Change oxygen saturation probe site  4.  Every 4-6 hours:  If on nasal continuous positive airway pressure, respiratory therapy assess nares and determine need for appliance change or resting period  Outcome: Progressing     Problem: Safety - Adult  Goal: Free from fall injury  Outcome: Progressing     Problem: Confusion  Goal: Confusion, delirium, dementia, or psychosis is improved or at baseline  Description: INTERVENTIONS:  1. Assess for possible contributors to thought disturbance, including medications, impaired vision or hearing, underlying metabolic abnormalities, dehydration, psychiatric diagnoses, and notify attending LIP  2. [place] high risk fall precautions, as indicated  3. Provide frequent short contacts to provide reality reorientation, refocusing and direction  4. Decrease environmental stimuli, including noise as appropriate  5. Monitor and intervene to maintain adequate nutrition, hydration, elimination, sleep and activity  6. If unable to ensure safety without constant attention obtain sitter and review sitter guidelines with assigned personnel  7. Initiate Psychosocial CNS and Spiritual Care consult, as indicated  Outcome: Progressing     Problem: Safety - Medical Restraint  Goal: Remains free of injury from restraints (Restraint for Interference with Medical Device)  Description: INTERVENTIONS:  1. Determine that other, less restrictive measures have been tried or would not be effective  before applying the restraint  2. Evaluate the patient's condition at the time of restraint application  3. Inform patient/family regarding the reason for restraint  4. Q2H: Monitor safety, psychosocial status, comfort, nutrition and hydration  Outcome: Progressing

## 2025-03-07 NOTE — PROGRESS NOTES
Patient is confused and seems to be unable to comprehend what anyone is telling her. She continues to attempt to get out of bed has attempted to pulled at IV lines. Soft wrist restraints placed after getting order from the patient's nurse practitioner for wrist restraints.

## 2025-03-07 NOTE — PROGRESS NOTES
Facility/Department: [facility] 3 RAMON/VAS/MED  Initial Speech/Language/Cognitive/Swallow Assessment    NAME: Leny Stone  : 1950   MRN: [id number]  ADMISSION DATE: 3/6/2025  ADMITTING DIAGNOSIS: has Gastroesophageal reflux disease; History of gastric bypass; Family history of colon cancer; Fibromyalgia; Encounter for current long-term use of anticoagulants; Primary osteoarthritis of right knee; Arthritis of knee; Hypertensive disorder; Hyperglycemia; Complex sleep apnea syndrome; Iron deficiency anemia; Restrictive airway disease; DVT, lower extremity, proximal, acute, unspecified laterality ([facility]); History of ulcer disease; Anticoagulated on Coumadin; Postmenopausal osteoporosis; Diabetes mellitus ([facility]); Pacemaker; History of DVT (deep vein thrombosis); Lupus anticoagulant disorder; History of pulmonary embolism; Thrombocytopenia; Hypothyroidism; Morbid obesity due to excess calories; Asthmatic bronchitis without complication; Gastroenteritis; Colic; Abdominal pain; Non-intractable vomiting with nausea; Severe episode of recurrent major depressive disorder, without psychotic features ([facility]); Lower abdominal pain; Chronic heartburn; S/P gastric bypass; Dog bite; Cellulitis of right upper extremity; Abscess of right arm; Soft tissue infection; Skin ulcer of upper arm, with fat layer exposed ([facility]); History of Gage-en-Y gastric bypass; Hyperlipidemia; Hypersomnia; Nonrheumatic mitral valve regurgitation; Obesity, Class II, BMI 35-39.9; Peripheral edema; Restless legs syndrome (RLS); Vitamin D deficiency; Chronic deep vein thrombosis (DVT) of proximal vein of lower extremity ([facility]); Unspecified severe protein-calorie malnutrition; H/O systemic lupus erythematosus (SLE) ([facility]); Type II diabetes mellitus with nephropathy ([facility]); Stable angina; Stage 3a chronic kidney disease ([facility]); Chronic renal disease, stage III ([facility]) [[id number]]; Acute kidney injury superimposed on chronic kidney disease; Chronic obstructive lung disease ([facility]);  penetration/aspiration was noted with any ice chip trial, puree consistency trial, regular solid consistency trial, nectar thick liquid trial, or thin H2O trial presented during evaluation this date. Chest pain was reported with thin H2O trials.     At this time, do suspect delayed epiglottic inversion. Anticipate component of esophageal dysfunction. Would recommend NPO status for further cardiology work-up. When cleared by MD, okay for soft and bite sized consistency with thin liquids. Do recommend meds crushed in pudding/applesauce.  Will continue to follow. Thank you for this consult.     Goals:  Short-term Goals  Timeframe for Short-term Goals: 1-2x/day for 3 days  Goal 1: Patient will tolerate soft and bite sized consistency and thin liquids with min S/S penetration/aspiration during PO intake.  Goal 2: Re-assessment of swallow function for potential diet upgrade.  Goal 3: Patient will complete breath support, oral motor, lingual, and pharyngeal exercises with provision of mod cues/prompts.  Goal 4: Re-assessment of speech/language/cognitive functioning.  Goal 5: Patient will complete attention and processing tasks with 80% accuracy and with provision of mod cues/prompts.  Goal 6: Patient will complete comprehension and verbalization tasks with 80% accuracy and with provision of mod cues/prompts.  Goal 7: Patient will complete memory functioning tasks with 80% accuracy and with provision of mod cues/prompts.  Goal 8: Patient will complete reasoning/problem solving tasks with 80% accuracy and with provision of mod cues/prompts.        Subjective:  Chart Reviewed: Yes  Patient assessed for rehabilitation services?: Yes  Family / Caregiver Present: Yes     Objective   Oral Motor:   Labial ROM: Decreased, bilaterally, during labial retraction trials and labial protrusion trials.  Labial Strength: Adequate during labial compression trials.  Labial Coordination: Slowed movements  Lingual ROM: Adequate during lingual

## 2025-03-07 NOTE — PROGRESS NOTES
03/07/25 1618   Encounter Summary   Encounter Overview/Reason Initial Encounter   Encounter Code  Counseling, Individual, by  services   Service Provided For Patient and family together  (with daughter at bedside)   Referral/Consult From Rounding   Support System Family members   Complexity of Encounter Low   Begin Time 1200   End Time  1230   Total Time Calculated 30 min   Crisis   Type Emotional distress   Spiritual/Emotional needs   Type Spiritual Support   Assessment/Intervention/Outcome   Assessment Coping;Hopeful   Intervention Active listening;Discussed belief system/Christianity practices/jacqueline;Prayer (assurance of)/[place]   Outcome Expressed feelings, needs, and concerns;Expressed Gratitude   Plan and Referrals   Plan/Referrals Continue to visit, (comment)   Does the patient have a BS PCP? Yes    to follow up after discharge? No     Electronically signed by Chaplain Shelby Brown on 3/7/2025 at 4:20 PM

## 2025-03-07 NOTE — PROGRESS NOTES
Leny Stone arrived to room # 316.   Presented with: Altered mental status  Mental Status: Patient is disoriented.   Vitals:    03/06/25 1904   BP:    Pulse:    Resp:    Temp:    SpO2: 100%     Patient safety contract and falls prevention contract reviewed with patient Yes.  Oriented Patient and Family to room.  Call light within reach. Yes.  Needs, issues or concerns expressed at this time: no.      Electronically signed by Mery Wellington RN on 3/6/2025 at 7:34 PM

## 2025-03-07 NOTE — PROGRESS NOTES
Occupational Therapy Initial Assessment  Date: 3/7/2025   Patient Name: Leny Stone  MRN: [id number]     : 1950    Date of Service: 3/7/2025    Discharge Recommendations:  Continue to assess pending progress, 24 hour supervision or assist, Patient would benefit from continued therapy after discharge       Assessment   Performance deficits / Impairments: Decreased functional mobility ;Decreased ADL status;Decreased strength;Decreased balance;Decreased posture;Decreased endurance;Decreased cognition  Assessment: Evaluation completed and tx initiated.  The patient would benefit from further skilled therapy to upgrade safety and functional independence. Pt was willing to work with therapy. Pt reported pain in her chest with CHANELLE Bauer present. Pt was able to sit EOB with max Ax2. Pt displayed generalized weakness impairing mobility. Pt required min/mod Ax2 for sit to stand from EOB, pt struggled to take side steps toward HOB due to difficulty moving BLE. Pt is also limited due to cognition. Pt would benefit from skilled OT services to address these deficits and improve overall safety during ADL performance.  Treatment Diagnosis: AMS  Prognosis: Fair  Decision Making: Medium Complexity  REQUIRES OT FOLLOW-UP: Yes  Activity Tolerance  Activity Tolerance: Patient limited by fatigue;Patient limited by pain              Patient Diagnosis(es): The encounter diagnosis was Altered mental status, unspecified altered mental status type.    Past Medical History:   Past Medical History:   Diagnosis Date    Abdominal pain     Abnormal EKG     Acute sinusitis     Acute superficial venous thrombosis of left lower extremity     ADHD (attention deficit hyperactivity disorder)     Allergic reaction to spider bite     Anemia     Anticoagulant long-term use     Anticoagulated     Anticoagulated on Coumadin     Anxiety     Arrhythmia     Asthmatic bronchitis without complication 2020    Ataxic gait     Atrial fibrillation (HCC)   Visual loss, one eye         Past Surgical History:   Past Surgical History:   Procedure Laterality Date    APPENDECTOMY      CARDIAC CATHETERIZATION  10/21/13  [facility]    EF over 60%    CHOLECYSTECTOMY      COLONOSCOPY  02/2010    negative    COLONOSCOPY  02/22/2010    Dr Mcdaniel    COLONOSCOPY  04/01/2016    Dr LAKHWINDER Horvath-internal hemorrhoids, 5 yr recall    COLONOSCOPY N/A 05/07/2021    Dr LAKHWINDER Horvath-Significant looping/tortuosity throughout the left colon, BE=Normal    DILATATION, ESOPHAGUS      EYE SURGERY      Cyst on Right eye    EYE SURGERY      GASTRIC BYPASS SURGERY      GASTRIC BYPASS SURGERY      HERNIA REPAIR      HYSTERECTOMY (CERVIX STATUS UNKNOWN)  1974    Complete    HYSTERECTOMY, TOTAL ABDOMINAL (CERVIX REMOVED)      INCONTINENCE SURGERY      Bladder Sling    INFECTED SKIN DEBRIDEMENT Right     dog bite right forearm    JOINT REPLACEMENT      KNEE ARTHROPLASTY  07/2022, 07/2018    OTHER SURGICAL HISTORY      IVC filter    PACEMAKER INSERTION      PACEMAKER PLACEMENT      medtronic    HI ARTHRP KNE CONDYLE&PLATU MEDIAL&LAT COMPARTMENTS Right 03/26/2018    TOTAL KNEE REPLACEMENT COMPLEX PRIMARY performed by Fernando Coy MD at [facility] OR    SMALL INTESTINE SURGERY      SMALL INTESTINE SURGERY      SMALL INTESTINE SURGERY      SPLENECTOMY      KRISTEL AND BSO (CERVIX REMOVED) Bilateral 1974    age 24    TONSILLECTOMY AND ADENOIDECTOMY      TOTAL KNEE ARTHROPLASTY Left 07/19/2022    UPPER GASTROINTESTINAL ENDOSCOPY  12/2011    gerd s/p gastric bypass    UPPER GASTROINTESTINAL ENDOSCOPY  02/2014    normal s/p gastric bypass    UPPER GASTROINTESTINAL ENDOSCOPY  02/2010    biopsy neg Barretts, chronic reflux esophagitis s/p gastric bypass    UPPER GASTROINTESTINAL ENDOSCOPY  07/2006    unremarkable s/p gastric bypass    UPPER GASTROINTESTINAL ENDOSCOPY  08/10/2015    Dr Khan    UPPER GASTROINTESTINAL ENDOSCOPY N/A 04/01/2016    Dr. Horvath:  H Pylori(-), HH, o/w normal    UPPER GASTROINTESTINAL ENDOSCOPY N/A  05/07/2021    Dr LAKHWINDER Horvath-Small hiatal hernia, previous gastric bypass surgery, gastritis    VENA CAVA FILTER PLACEMENT Right 2003       Treatment Diagnosis: AMS      Restrictions  Restrictions/Precautions  Restrictions/Precautions: Fall Risk, Contact Precautions  Activity Level: Up with Assist  Required Braces or Orthoses?: No    Subjective      Pain Assessment  Pain Assessment: Alberto-Baker FACES  Alberto-Baker Pain Rating: No hurt  Vital Signs  Temp: 97.5 °F (36.4 °C)  Temp Source: Oral  Pulse: 80  Heart Rate Source: Monitor  Respirations: 18  BP: (!) 170/97  MAP (Calculated): 121  MAP (mmHg): 119  BP Location: Left upper arm  Patient Position: Other (Comment)  Height and Weight  Height: 170.2 cm (5' 7.01\")  Oxygen Therapy  SpO2: 96 %  O2 Device: None (Room air)    Social/Functional History          Objective              Observation/Palpation  Posture: Fair  Observation: CHANELLE Bauer present, IV, purewick, telemetry     ADL  Feeding: NPO  Grooming: Minimal assistance;Setup;Verbal cueing  UE Bathing: Moderate assistance  LE Bathing: Maximum assistance;Dependent/Total  UE Dressing: Moderate assistance  LE Dressing: Maximum assistance;Dependent/Total  Putting On/Taking Off Footwear: Maximum assistance;Dependent/Total  Toileting: Maximum assistance;Dependent/Total        Bed mobility  Supine to Sit: Substantial/Maximal assistance;2 Person assistance  Sit to Supine: Substantial/Maximal assistance;2 Person assistance  Scooting: Substantial/Maximal assistance;2 Person assistance  Transfers  Sit to stand: Minimal assistance;Moderate assistance;2 Person assistance  Stand to sit: Minimal assistance;Moderate assistance;2 Person assistance  Transfer Comments: HHA x2 for side steps to HOB     Cognition  Overall Cognitive Status: Exceptions  Arousal/Alertness: Appears intact  Following Commands: Follows one step commands with increased time;Follows one step commands with repetition  Attention Span: Appears intact  Memory:

## 2025-03-07 NOTE — PROGRESS NOTES
4 Eyes Skin Assessment     NAME:  Leny Stone  YOB: 1950  MEDICAL RECORD NUMBER:  [id number]    The patient is being assessed for  Admission    I agree that at least one RN has performed a thorough Head to Toe Skin Assessment on the patient. ALL assessment sites listed below have been assessed.      Areas assessed by both nurses:    Head, Face, Ears, Shoulders, Back, Chest, Arms, Elbows, Hands, Sacrum. Buttock, Coccyx, Ischium, Legs. Feet and Heels, and Under Medical Devices         Does the Patient have a Wound? No noted wound(s)       Prudencio Prevention initiated by RN: No  Wound Care Orders initiated by RN: No    Pressure Injury (Stage 3,4, Unstageable, DTI, NWPT, and Complex wounds) if present, place Wound referral order by RN under : No    New Ostomies, if present place, Ostomy referral order under : No     Nurse 1 eSignature: Electronically signed by Mery Wellington RN on 3/6/25 at 7:36 PM CST    **SHARE this note so that the co-signing nurse can place an eSignature**    Nurse 2 eSignature: {Esignature:[id number]}

## 2025-03-07 NOTE — CARE COORDINATION
Pt is a 15% risk of readmission    Case Management Assessment  Initial Evaluation    Date/Time of Evaluation: 3/7/2025 12:36 PM  Assessment Completed by: NITZA Ocampo    If patient is discharged prior to next notation, then this note serves as note for discharge by case management.    Patient Name: Leny Stone                   YOB: 1950  Diagnosis: Altered mental status [R41.82]  Altered mental status, unspecified altered mental status type [R41.82]                   Date / Time: 3/6/2025 12:33 PM    Patient Admission Status: Inpatient   Readmission Risk (Low < 19, Mod (19-27), High > 27): Readmission Risk Score: 15.3    Current PCP: Regina Curtis MD  PCP verified by CM?      Chart Reviewed: Yes      History Provided by:    Patient Orientation:      Patient Cognition:      Hospitalization in the last 30 days (Readmission):  No    If yes, Readmission Assessment in CM Navigator will be completed.    Advance Directives:      Code Status: Full Code   Patient's Primary Decision Maker is:      Primary Decision Maker: ChaseVero - Child - [phone]    Secondary Decision Maker: Leny Pruitt - Parent - [phone]    Secondary Decision Maker: Janine Burgos - Brother/Sister - [phone]    Secondary Decision Maker: Shayy Robles - Grandchild - [phone]    Discharge Planning:    Patient lives with: Children Type of Home: House  Primary Care Giver:    Patient Support Systems include: Children   Current Financial resources:    Current community resources:    Current services prior to admission: C-pap            Current DME:              Type of Home Care services:  None    ADLS  Prior functional level:    Current functional level:      PT AM-PAC:   /24  OT AM-PAC:   /24    Family can provide assistance at DC:    Would you like Case Management to discuss the discharge plan with any other family members/significant others, and if so, who?    Plans to Return to Present Housing:    Other

## 2025-03-07 NOTE — PROGRESS NOTES
Comprehensive Nutrition Assessment    Type and Reason for Visit:  Initial, Positive nutrition screen    Nutrition Recommendations/Plan:   Follow for po intake, labs, weight     Malnutrition Assessment:  Malnutrition Status:  At risk for malnutrition (03/07/25 1051)    Context:  Acute Illness     Findings of the 6 clinical characteristics of malnutrition:  Energy Intake:  Unable to assess  Weight Loss:  Mild weight loss     Body Fat Loss:  No body fat loss     Muscle Mass Loss:  No muscle mass loss    Fluid Accumulation:  No fluid accumulation     Strength:  Not Performed    Nutrition Assessment:    +NS for decreased weight and po intake.  At time of visit, pt was NPO and sound asleep.  Diet has since been advanced to Cardiac.  Patient has lost 1# in tahe past month.  Accuchek's 65-86.  A1c 5.5    Nutrition Related Findings:    HX- Gastric bypass Wound Type: None       Current Nutrition Intake & Therapies:    Average Meal Intake: Unable to assess  Average Supplements Intake: None Ordered  ADULT DIET; Regular; Low Fat/Low Chol/High Fiber/2 gm Na; High Fiber    Anthropometric Measures:  Height: 170.2 cm (5' 7.01\")  Ideal Body Weight (IBW): 135 lbs (61 kg)    Admission Body Weight: 108.9 kg (240 lb 1.3 oz)  Current Body Weight: 108.9 kg (240 lb 1.3 oz), 177.8 % IBW. Weight Source: Stated  Current BMI (kg/m2): 37.6  Usual Body Weight: 109.3 kg (240 lb 15.4 oz) (2/15/2025)  % Weight Change (Calculated): -0.4  Weight Adjustment For: No Adjustment  BMI Categories: Obese Class 2 (BMI 35.0 -39.9)    Estimated Daily Nutrient Needs:  Energy Requirements Based On: Kcal/kg  Weight Used for Energy Requirements: Current  Energy (kcal/day): 4923-3690 kcals (15-18 kcals/kg)  Weight Used for Protein Requirements: Ideal  Protein (g/day): 74-123g  Method Used for Fluid Requirements: 1 ml/kcal  Fluid (ml/day): 2291-3711 ml    Nutrition Diagnosis:   Inadequate oral intake related to cognitive or neurological impairment as evidenced by  poor intake prior to admission    Nutrition Interventions:   Food and/or Nutrient Delivery: Continue Current Diet  Nutrition Education/Counseling: No recommendation at this time  Coordination of Nutrition Care: Continue to monitor while inpatient       Goals:  Goals: Meet at least 75% of estimated needs, PO intake 50% or greater, Maintain adequate nutrition status  Type of Goal: New goal       Nutrition Monitoring and Evaluation:   Behavioral-Environmental Outcomes: None Identified  Food/Nutrient Intake Outcomes: Food and Nutrient Intake  Physical Signs/Symptoms Outcomes: Biochemical Data, Fluid Status or Edema, Weight, Skin    Discharge Planning:    Continue current diet     Patsy Mcdaniels, MS, RD, LD  Contact: [phone]

## 2025-03-08 ENCOUNTER — APPOINTMENT (OUTPATIENT)
Dept: NUCLEAR MEDICINE | Age: 75
DRG: 069 | End: 2025-03-08
Attending: INTERNAL MEDICINE
Payer: MEDICARE

## 2025-03-08 LAB
ANION GAP SERPL CALCULATED.3IONS-SCNC: 16 MMOL/L (ref 8–16)
BASOPHILS # BLD: 0 K/UL (ref 0–0.2)
BASOPHILS NFR BLD: 0.3 % (ref 0–1)
BUN SERPL-MCNC: 21 MG/DL (ref 8–23)
CALCIUM SERPL-MCNC: 8.6 MG/DL (ref 8.8–10.2)
CHLORIDE SERPL-SCNC: 100 MMOL/L (ref 98–107)
CHOLEST SERPL-MCNC: 189 MG/DL (ref 0–199)
CO2 SERPL-SCNC: 17 MMOL/L (ref 22–29)
CREAT SERPL-MCNC: 1.6 MG/DL (ref 0.5–0.9)
EOSINOPHIL # BLD: 0.1 K/UL (ref 0–0.6)
EOSINOPHIL NFR BLD: 1 % (ref 0–5)
ERYTHROCYTE [DISTWIDTH] IN BLOOD BY AUTOMATED COUNT: 15.6 % (ref 11.5–14.5)
GLUCOSE BLD-MCNC: 125 MG/DL (ref 70–99)
GLUCOSE BLD-MCNC: 131 MG/DL (ref 70–99)
GLUCOSE BLD-MCNC: 148 MG/DL (ref 70–99)
GLUCOSE SERPL-MCNC: 152 MG/DL (ref 70–99)
HCT VFR BLD AUTO: 33.8 % (ref 37–47)
HDLC SERPL-MCNC: 89 MG/DL (ref 40–60)
HGB BLD-MCNC: 10.7 G/DL (ref 12–16)
IMM GRANULOCYTES # BLD: 0.1 K/UL
INR PPP: 1.24 (ref 0.88–1.18)
LDLC SERPL CALC-MCNC: 86 MG/DL
LYMPHOCYTES # BLD: 1.8 K/UL (ref 1.1–4.5)
LYMPHOCYTES NFR BLD: 17.8 % (ref 20–40)
MCH RBC QN AUTO: 29.5 PG (ref 27–31)
MCHC RBC AUTO-ENTMCNC: 31.7 G/DL (ref 33–37)
MCV RBC AUTO: 93.1 FL (ref 81–99)
MONOCYTES # BLD: 1.6 K/UL (ref 0–0.9)
MONOCYTES NFR BLD: 15.8 % (ref 0–10)
NEUTROPHILS # BLD: 6.5 K/UL (ref 1.5–7.5)
NEUTS SEG NFR BLD: 64.2 % (ref 50–65)
PERFORMED ON: ABNORMAL
PLATELET # BLD AUTO: 113 K/UL (ref 130–400)
PMV BLD AUTO: 12.5 FL (ref 9.4–12.3)
POTASSIUM SERPL-SCNC: 4 MMOL/L (ref 3.5–5)
PROTHROMBIN TIME: 15.3 SEC (ref 12–14.6)
RBC # BLD AUTO: 3.63 M/UL (ref 4.2–5.4)
SODIUM SERPL-SCNC: 133 MMOL/L (ref 136–145)
TRIGL SERPL-MCNC: 71 MG/DL (ref 0–149)
WBC # BLD AUTO: 10.2 K/UL (ref 4.8–10.8)

## 2025-03-08 PROCEDURE — 85610 PROTHROMBIN TIME: CPT

## 2025-03-08 PROCEDURE — 97530 THERAPEUTIC ACTIVITIES: CPT

## 2025-03-08 PROCEDURE — 99232 SBSQ HOSP IP/OBS MODERATE 35: CPT | Performed by: INTERNAL MEDICINE

## 2025-03-08 PROCEDURE — 36415 COLL VENOUS BLD VENIPUNCTURE: CPT

## 2025-03-08 PROCEDURE — A9540 TC99M MAA: HCPCS | Performed by: INTERNAL MEDICINE

## 2025-03-08 PROCEDURE — 99232 SBSQ HOSP IP/OBS MODERATE 35: CPT | Performed by: PSYCHIATRY & NEUROLOGY

## 2025-03-08 PROCEDURE — 3430000000 HC RX DIAGNOSTIC RADIOPHARMACEUTICAL: Performed by: INTERNAL MEDICINE

## 2025-03-08 PROCEDURE — 94640 AIRWAY INHALATION TREATMENT: CPT

## 2025-03-08 PROCEDURE — 80061 LIPID PANEL: CPT

## 2025-03-08 PROCEDURE — 85025 COMPLETE CBC W/AUTO DIFF WBC: CPT

## 2025-03-08 PROCEDURE — 80048 BASIC METABOLIC PNL TOTAL CA: CPT

## 2025-03-08 PROCEDURE — 6370000000 HC RX 637 (ALT 250 FOR IP): Performed by: NURSE PRACTITIONER

## 2025-03-08 PROCEDURE — 1200000000 HC SEMI PRIVATE

## 2025-03-08 PROCEDURE — 6370000000 HC RX 637 (ALT 250 FOR IP)

## 2025-03-08 PROCEDURE — 2500000003 HC RX 250 WO HCPCS

## 2025-03-08 PROCEDURE — 6370000000 HC RX 637 (ALT 250 FOR IP): Performed by: INTERNAL MEDICINE

## 2025-03-08 PROCEDURE — 6370000000 HC RX 637 (ALT 250 FOR IP): Performed by: STUDENT IN AN ORGANIZED HEALTH CARE EDUCATION/TRAINING PROGRAM

## 2025-03-08 PROCEDURE — 6360000002 HC RX W HCPCS: Performed by: NURSE PRACTITIONER

## 2025-03-08 PROCEDURE — 78580 LUNG PERFUSION IMAGING: CPT

## 2025-03-08 PROCEDURE — 94760 N-INVAS EAR/PLS OXIMETRY 1: CPT

## 2025-03-08 PROCEDURE — 82962 GLUCOSE BLOOD TEST: CPT

## 2025-03-08 RX ORDER — METOPROLOL TARTRATE 25 MG/1
25 TABLET, FILM COATED ORAL 2 TIMES DAILY
Status: DISCONTINUED | OUTPATIENT
Start: 2025-03-08 | End: 2025-03-18 | Stop reason: HOSPADM

## 2025-03-08 RX ORDER — WARFARIN SODIUM 5 MG/1
5 TABLET ORAL
Status: COMPLETED | OUTPATIENT
Start: 2025-03-08 | End: 2025-03-09

## 2025-03-08 RX ORDER — BUMETANIDE 1 MG/1
1 TABLET ORAL DAILY
Status: DISCONTINUED | OUTPATIENT
Start: 2025-03-08 | End: 2025-03-18 | Stop reason: HOSPADM

## 2025-03-08 RX ADMIN — ESCITALOPRAM OXALATE 20 MG: 10 TABLET ORAL at 11:04

## 2025-03-08 RX ADMIN — SODIUM CHLORIDE, PRESERVATIVE FREE 10 ML: 5 INJECTION INTRAVENOUS at 19:54

## 2025-03-08 RX ADMIN — SODIUM BICARBONATE 650 MG: 650 TABLET ORAL at 19:53

## 2025-03-08 RX ADMIN — OXYCODONE 2.5 MG: 5 TABLET ORAL at 20:04

## 2025-03-08 RX ADMIN — OXYCODONE 2.5 MG: 5 TABLET ORAL at 15:24

## 2025-03-08 RX ADMIN — Medication 5 MILLICURIE: at 08:16

## 2025-03-08 RX ADMIN — SUCRALFATE 1 G: 1 TABLET ORAL at 19:53

## 2025-03-08 RX ADMIN — AMIODARONE HYDROCHLORIDE 100 MG: 200 TABLET ORAL at 11:04

## 2025-03-08 RX ADMIN — METOPROLOL TARTRATE 25 MG: 25 TABLET, FILM COATED ORAL at 19:53

## 2025-03-08 RX ADMIN — SODIUM CHLORIDE, PRESERVATIVE FREE 10 ML: 5 INJECTION INTRAVENOUS at 11:05

## 2025-03-08 RX ADMIN — METOCLOPRAMIDE 5 MG: 10 TABLET ORAL at 19:53

## 2025-03-08 RX ADMIN — ROSUVASTATIN CALCIUM 40 MG: 20 TABLET, FILM COATED ORAL at 11:03

## 2025-03-08 RX ADMIN — OXYCODONE 2.5 MG: 5 TABLET ORAL at 11:02

## 2025-03-08 RX ADMIN — LEVOTHYROXINE SODIUM 100 MCG: 100 TABLET ORAL at 05:20

## 2025-03-08 RX ADMIN — GABAPENTIN 300 MG: 300 CAPSULE ORAL at 19:53

## 2025-03-08 RX ADMIN — ENOXAPARIN SODIUM 105 MG: 150 INJECTION SUBCUTANEOUS at 19:54

## 2025-03-08 RX ADMIN — ASPIRIN 81 MG: 81 TABLET, CHEWABLE ORAL at 11:03

## 2025-03-08 RX ADMIN — SUCRALFATE 1 G: 1 TABLET ORAL at 11:04

## 2025-03-08 RX ADMIN — CALCITRIOL CAPSULES 0.25 MCG 0.25 MCG: 0.25 CAPSULE ORAL at 11:04

## 2025-03-08 RX ADMIN — METOCLOPRAMIDE 5 MG: 10 TABLET ORAL at 17:03

## 2025-03-08 RX ADMIN — BUMETANIDE 1 MG: 1 TABLET ORAL at 14:33

## 2025-03-08 RX ADMIN — SODIUM BICARBONATE 650 MG: 650 TABLET ORAL at 11:04

## 2025-03-08 RX ADMIN — DICLOFENAC SODIUM 2 G: 10 GEL TOPICAL at 20:01

## 2025-03-08 RX ADMIN — METOCLOPRAMIDE 5 MG: 10 TABLET ORAL at 11:03

## 2025-03-08 RX ADMIN — PANTOPRAZOLE SODIUM 40 MG: 40 TABLET, DELAYED RELEASE ORAL at 17:03

## 2025-03-08 RX ADMIN — SUCRALFATE 1 G: 1 TABLET ORAL at 17:03

## 2025-03-08 RX ADMIN — TIOTROPIUM BROMIDE AND OLODATEROL 2 PUFF: 3.124; 2.736 SPRAY, METERED RESPIRATORY (INHALATION) at 10:57

## 2025-03-08 RX ADMIN — ENOXAPARIN SODIUM 105 MG: 150 INJECTION SUBCUTANEOUS at 11:05

## 2025-03-08 RX ADMIN — DICLOFENAC SODIUM 2 G: 10 GEL TOPICAL at 12:48

## 2025-03-08 RX ADMIN — PANTOPRAZOLE SODIUM 40 MG: 40 TABLET, DELAYED RELEASE ORAL at 05:21

## 2025-03-08 RX ADMIN — SUCRALFATE 1 G: 1 TABLET ORAL at 05:21

## 2025-03-08 RX ADMIN — GABAPENTIN 300 MG: 300 CAPSULE ORAL at 14:33

## 2025-03-08 RX ADMIN — OXYCODONE 2.5 MG: 5 TABLET ORAL at 00:40

## 2025-03-08 RX ADMIN — METOCLOPRAMIDE 5 MG: 10 TABLET ORAL at 05:20

## 2025-03-08 ASSESSMENT — PAIN DESCRIPTION - PAIN TYPE: TYPE: ACUTE PAIN

## 2025-03-08 ASSESSMENT — PAIN - FUNCTIONAL ASSESSMENT: PAIN_FUNCTIONAL_ASSESSMENT: PREVENTS OR INTERFERES SOME ACTIVE ACTIVITIES AND ADLS

## 2025-03-08 ASSESSMENT — PAIN DESCRIPTION - LOCATION
LOCATION: ARM;BACK
LOCATION: RIB CAGE;BACK
LOCATION: RIB CAGE
LOCATION: CHEST
LOCATION: CHEST

## 2025-03-08 ASSESSMENT — PAIN DESCRIPTION - DESCRIPTORS
DESCRIPTORS: ACHING;SORE
DESCRIPTORS: ACHING
DESCRIPTORS: SHARP;STABBING

## 2025-03-08 ASSESSMENT — ENCOUNTER SYMPTOMS
COUGH: 0
ABDOMINAL DISTENTION: 0
DIARRHEA: 0
TROUBLE SWALLOWING: 0
BLOOD IN STOOL: 0
VOMITING: 0
CONSTIPATION: 0
WHEEZING: 0
SHORTNESS OF BREATH: 0
ABDOMINAL PAIN: 0
SINUS PAIN: 0
SORE THROAT: 0
NAUSEA: 0

## 2025-03-08 ASSESSMENT — PAIN SCALES - GENERAL
PAINLEVEL_OUTOF10: 2
PAINLEVEL_OUTOF10: 1
PAINLEVEL_OUTOF10: 8
PAINLEVEL_OUTOF10: 2
PAINLEVEL_OUTOF10: 6
PAINLEVEL_OUTOF10: 5

## 2025-03-08 ASSESSMENT — PAIN DESCRIPTION - ORIENTATION
ORIENTATION: RIGHT;LEFT;MID
ORIENTATION: MID
ORIENTATION: LEFT
ORIENTATION: LEFT

## 2025-03-08 ASSESSMENT — PAIN DESCRIPTION - FREQUENCY: FREQUENCY: INTERMITTENT

## 2025-03-08 NOTE — PROGRESS NOTES
Patient:   Leny Stone  MR#:    [id number]   Room:    0316/316-02   YOB: 1950  Date of Progress Note: 3/8/2025  Time of Note                           10:50 AM  Consulting Physician:   Fabiano Sheffield M.D.  Attending Physician:  Lee Minaya MD       CHIEF COMPLAINT: Altered mental status  Subjective:  This 74 y.o. female who presents with altered mental status through the ED on 3/6.  She was brought in by her family.  She was reportedly able to talk and speak some but was not answering questions appropriately.  Came in through the ED and a code stroke was called.  CT brain perfusion and CTA with CT of the brain were all unremarkable.  Patient is on Coumadin but her INR was subtherapeutic.  She has a history of recurrent DVTs and what sounds like possible lupus anticoagulant.  There is a family history of hypercoagulability.  Atrial fibrillation is listed as a medical problem as well.  History is obtained from family at bedside as well as from the chart.  Patient also has been describing chest pain which appears more musculoskeletal.  Blood pressure has been elevated since admission.  Patient reportedly noncompliant with medications in the past.  Also has CHF, CKD, diabetes and hypertension.  Continues to improve neurologically but complaining of what sounds like mechanical chest pain.  REVIEW OF SYSTEMS:  Constitutional: No fevers No chills  Neck:No stiffness  Respiratory: No shortness of breath  Cardiovascular: No irregularities no palpitations  Gastrointestinal: No abdominal pain    Genitourinary: No Dysuria  Neurological: No headache, no confusion      PHYSICAL EXAM:  BP (!) 154/82   Pulse 76   Temp 98.2 °F (36.8 °C) (Oral)   Resp 16   Ht 1.702 m (5' 7.01\")   Wt 108.9 kg (240 lb)   SpO2 95%   BMI 37.58 kg/m²     Constitutional: she appears well-developed and well-nourished.   Eyes - conjunctiva normal.  Pupils react to light  Ear, nose, throat -hearing intact to voice. No scars, masses, or  further neurological recommendations currently.  Will follow peripherally

## 2025-03-08 NOTE — PROGRESS NOTES
Patient was approved to use home INR machine to check INR lab, patient had been stuck 6 times from lab and two time by RT for arterial blood draw. Patient INR 1.3

## 2025-03-08 NOTE — PROGRESS NOTES
STROKE ADMISSION    Date of Note:  3/6/2025  Time of Note:  7:48 PM    Patient Name:  Leny Stone  MRN:  [id number]  YOB: 1950  Age:      74 y.o.  Gender:      female    Room:  [address] [place] [address]   Adm. Date: 3/6/2025  Adm. Status:   Allergies: Insect extract, Lactose, Morphine, Lactose intolerance (gi), Lortab [hydrocodone-acetaminophen], Other, Oxycodone-acetaminophen, Prednisone, Tramadol, Ultram [tramadol hcl], and Zanaflex [tizanidine hcl]  Code Status: Full Code    Adm. Provider: Lee Minaya MD  Neuro. Provider: Dr. Sage      Presentation(s)    ED Chief Complaint  Chief Complaint   Patient presents with    Altered Mental Status     Per family, pt glucose 55 upon EMS arrival        ED Impression  1. Altered mental status, unspecified altered mental status type             Admission Impression  Principal Problem:    Altered mental status  Active Problems:    Chronic renal disease, stage III (HCC) [421034]    Chronic obstructive lung disease (HCC)    Paroxysmal atrial fibrillation (HCC)    Gastroesophageal reflux disease    Hypertensive disorder    History of DVT (deep vein thrombosis)    Hypothyroidism    Hyperlipidemia    Anxiety    Neuropathy  Resolved Problems:    * No resolved hospital problems. *                    Last Known Well Date & Time         Current NIHSS:  NIH Stroke Scale  NIH Stroke Scale Assessed: Yes  Interval: Reassessment  Level of Consciousness (1a): Alert  LOC Questions (1b): Answers both correctly  LOC Commands (1c): Performs both tasks correctly  Best Gaze (2): Normal  Visual (3): No visual loss  Facial Palsy (4): Normal symmetrical movement  Motor Arm, Left (5a): No drift  Motor Arm, Right (5b): No drift  Motor Leg, Left (6a): No drift  Motor Leg, Right (6b): No drift  Limb Ataxia (7): Absent  Sensory (8): Normal  Best Language (9): No aphasia  Dysarthria (10): Normal  Extinction and Inattention (11): No abnormality  Total: 0    Current GCS:  Clarisa Coma Scale  Eye  Opening: Spontaneous  Best Verbal Response: Inappropriate words  Best Motor Response: Localizes pain  Clarisa Coma Scale Score: 12      Education & Care Plan    [x]    Education Assessment Completed      Patient preferred method of education:   []    Visual   []    Written   []    Verbal   []    Demonstration   [x]    All of the above    []    Individualized Stroke/TIA Education template added, including patient specific risk factors:       Hypertension, High Cholesterol, Atrial fibrillation, and Diabetes    [x]    Stroke education initiated with patient and/or caregiver.    []    Stroke booklet given and reviewed completely and efficiently with patient and/or caregiver.  All questions and concerns addressed.  Will continue to educate appropriately.    [x]    Individualized Stroke/TIA Care Plan added      Nadine Swallow Screen (YSS)    [x]    Bedside swallow screen completed using the YSS Protocol, and documented prior to any PO meds, food, or drink:     []    Completed in ED    []    Passed    []    Failed and awaiting SLP eval     [x]    Completed upon admission to this unit    []    Passed    [x]    Failed and awaiting SLP eval     []    Patient strict NPO status for procedure/testing      [x]  NIHSS on admission         Swallow Screening  Is the patient unable to remain alert for testing?: No  Is the patient on a modified diet (thickened liquids) due to pre-existing dysphagia?: No  Is there presence of existing enteral tube feeding via the stomach or nose?: No  Is there presence of head-of-bed restrictions (less than 30 degrees)?: No  Is there presence of tracheotomy tube?: No  Is the patient ordered nothing-by-mouth status?: (!) Yes  3 oz Water Swallow Screen: (!) Fail  Reason for Fail: Other (comment) (patient is unable to follow commands; patient also refused to drink water 3 different times and moved her head away.)    VTE Prophylaxis  (ASA, Plavix and tPA are not VTE prophylaxis)      SCDs   []    On   []

## 2025-03-08 NOTE — PLAN OF CARE
Problem: Chronic Conditions and Co-morbidities  Goal: Patient's chronic conditions and co-morbidity symptoms are monitored and maintained or improved  Outcome: Progressing     Problem: Discharge Planning  Goal: Discharge to home or other facility with appropriate resources  Outcome: Progressing     Problem: Skin/Tissue Integrity  Goal: Skin integrity remains intact  Description: 1.  Monitor for areas of redness and/or skin breakdown  2.  Assess vascular access sites hourly  3.  Every 4-6 hours minimum:  Change oxygen saturation probe site  4.  Every 4-6 hours:  If on nasal continuous positive airway pressure, respiratory therapy assess nares and determine need for appliance change or resting period  Outcome: Progressing     Problem: Safety - Adult  Goal: Free from fall injury  Outcome: Progressing     Problem: Confusion  Goal: Confusion, delirium, dementia, or psychosis is improved or at baseline  Description: INTERVENTIONS:  1. Assess for possible contributors to thought disturbance, including medications, impaired vision or hearing, underlying metabolic abnormalities, dehydration, psychiatric diagnoses, and notify attending LIP  2. [place] high risk fall precautions, as indicated  3. Provide frequent short contacts to provide reality reorientation, refocusing and direction  4. Decrease environmental stimuli, including noise as appropriate  5. Monitor and intervene to maintain adequate nutrition, hydration, elimination, sleep and activity  6. If unable to ensure safety without constant attention obtain sitter and review sitter guidelines with assigned personnel  7. Initiate Psychosocial CNS and Spiritual Care consult, as indicated  Outcome: Progressing     Problem: Safety - Medical Restraint  Goal: Remains free of injury from restraints (Restraint for Interference with Medical Device)  Description: INTERVENTIONS:  1. Determine that other, less restrictive measures have been tried or would not be effective

## 2025-03-08 NOTE — CARE COORDINATION
NIH Stroke Scale  NIH Stroke Scale Assessed: Yes  Interval: Reassessment  Level of Consciousness (1a): Alert  LOC Questions (1b): Answers both correctly  LOC Commands (1c): Performs both tasks correctly  Best Gaze (2): Normal  Visual (3): No visual loss  Facial Palsy (4): Normal symmetrical movement  Motor Arm, Left (5a): No drift  Motor Arm, Right (5b): No drift  Motor Leg, Left (6a): No drift  Motor Leg, Right (6b): No drift  Limb Ataxia (7): Absent  Sensory (8): Normal  Best Language (9): No aphasia  Dysarthria (10): Normal  Extinction and Inattention (11): No abnormality  Total: 0    Clarisa Coma Scale  Eye Opening: Spontaneous  Best Verbal Response: Inappropriate words  Best Motor Response: Localizes pain  [place] Coma Scale Score: 12               03/07/25 1236   Service Assessment   Patient Orientation Alert and Oriented;Person   Cognition Alert   History Provided By Child/Family;Medical Record   Primary Caregiver Self   Accompanied By/Relationship granddaughter   Support Systems Children;Family Members;[facility] [facility]   Patient's Healthcare Decision Maker is: Named in Scanned ACP Document   PCP Verified by CM Yes   Last Visit to PCP Within last 3 months   Prior Functional Level Independent in ADLs/IADLs   Current Functional Level Assistance with the following:;Bathing;Dressing;Toileting;Cooking;Housework;Shopping;Mobility  (prior to hospitalization-currently needs assist with ADL/IADLs)   Can patient return to prior living arrangement Yes   Ability to make needs known: Good   Family able to assist with home care needs: Yes   Would you like for me to discuss the discharge plan with any other family members/significant others, and if so, who? Yes  (family)   Financial Resources Medicare  ([employer] Medicare)   Community Resources None   CM/SW Referral Other (see comment)  (following for d/c planning)   Social/Functional History   Lives With Daughter   Type of Home Mobile home   Home Layout One level

## 2025-03-08 NOTE — PROCEDURES
[address] [address]                       ELECTROENCEPHALOGRAM REPORT      PATIENT NAME: AHMET HANNON                   : 1950  MED REC NO: [id number]                          ROOM: 0316  ACCOUNT NO: [id number]                       ADMIT DATE: 2025  PROVIDER: Ofelia Sheffield MD      DATE OF SERVICE:  2025    INDICATION FOR TEST:  Altered mental status.    DESCRIPTION:  The waking background consists of a rhythmic and symmetric well-formed and well-regulated posterior dominant 7-8 hertz activity.  Intermittent rhythmic delta activity is noted in the frontal regions.  No focal slowing nor any epileptiform activity is noted.    IMPRESSION:  Abnormal EEG due to mild diffuse background slowing, which is nonspecific as well as frontal intermittent rhythmic delta activity (FIRDA), which again is a nonspecific abnormality.  No clear epileptiform activity identified.  Correlate clinically.          OFELIA SHEFFIELD MD      D:  2025 11:49:35     T:  2025 12:21:16     LEONARD/LONNY  Job #:  [id number]     Doc#:  [id number]

## 2025-03-08 NOTE — PROGRESS NOTES
Physical Therapy  Name: Leny Stone  MRN:  [id number]  Date of service:  3/8/2025       03/08/25 1536   Restrictions/Precautions   Restrictions/Precautions Fall Risk;Contact Precautions   Activity Level Up with Assist   Subjective   Subjective Pt agreed to therapy.   Pain Assessment   Pain Assessment 0-10   Pain Level 5   Pain Location Rib cage;Back   Pain Orientation Left   Pain Descriptors Aching;Sore   Functional Pain Assessment Prevents or interferes some active activities and ADLs   Pain Type Acute pain   Pain Frequency Intermittent   Non-Pharmaceutical Pain Intervention(s) Ambulation/Increased Activity;Repositioned;Rest   Response to Pain Intervention Patient satisfied   Bed Mobility   Supine to Sit Stand by assistance  (with increased time given and cueing to reach across to bed rail)   Sit to Supine Minimal assistance;Moderate assistance  (assist with LE's)   Comment sat EOB several minutes, able to maintain sitting balance without assist   Transfers   Sit to Stand Minimal Assistance;Partial/Moderate assistance;2 Person Assistance   Stand to Sit Minimal Assistance;2 Person Assistance   Comment pt stood EOB with RW   Ambulation   Surface Level tile   Device Rolling Walker   Assistance Minimal assistance   Quality of Gait small, shuffled steps   Gait Deviations Slow Emily;Decreased step length;Decreased step height   Distance side stepped to HOB   Patient Goals    Patient Goals  get better   Short Term Goals   Time Frame for Short Term Goals 2 wks   Short Term Goal 1 supine to sit indep   Short Term Goal 2 sit to stand indep   Short Term Goal 3 amb. 200' with RW SBA   Short Term Goal 4 bed to chair SBA   Conditions Requiring Skilled Therapeutic Intervention   Body Structures, Functions, Activity Limitations Requiring Skilled Therapeutic Intervention Decreased functional mobility ;Decreased ADL status;Decreased ROM;Decreased safe awareness;Decreased strength;Decreased balance;Decreased cognition;Decreased

## 2025-03-08 NOTE — PLAN OF CARE
Problem: Chronic Conditions and Co-morbidities  Goal: Patient's chronic conditions and co-morbidity symptoms are monitored and maintained or improved  3/8/2025 0448 by Ana Jackman RN  Outcome: Progressing  3/7/2025 1938 by Mery Wellington RN  Outcome: Progressing     Problem: Discharge Planning  Goal: Discharge to home or other facility with appropriate resources  3/8/2025 0448 by Ana Jackman RN  Outcome: Progressing  Flowsheets (Taken 3/7/2025 2044)  Discharge to home or other facility with appropriate resources: Identify barriers to discharge with patient and caregiver  3/7/2025 1938 by Mery Wellington RN  Outcome: Progressing     Problem: Skin/Tissue Integrity  Goal: Skin integrity remains intact  Description: 1.  Monitor for areas of redness and/or skin breakdown  2.  Assess vascular access sites hourly  3.  Every 4-6 hours minimum:  Change oxygen saturation probe site  4.  Every 4-6 hours:  If on nasal continuous positive airway pressure, respiratory therapy assess nares and determine need for appliance change or resting period  3/8/2025 0448 by Ana Jackman RN  Outcome: Progressing  3/7/2025 1938 by Mery Wellington RN  Outcome: Progressing     Problem: Safety - Adult  Goal: Free from fall injury  3/8/2025 0448 by Ana Jackman RN  Outcome: Progressing  3/7/2025 1938 by Mery Wellington RN  Outcome: Progressing     Problem: Confusion  Goal: Confusion, delirium, dementia, or psychosis is improved or at baseline  Description: INTERVENTIONS:  1. Assess for possible contributors to thought disturbance, including medications, impaired vision or hearing, underlying metabolic abnormalities, dehydration, psychiatric diagnoses, and notify attending LIP  2. [place] high risk fall precautions, as indicated  3. Provide frequent short contacts to provide reality reorientation, refocusing and direction  4. Decrease environmental stimuli, including noise as appropriate  5. Monitor and intervene to maintain  adequate nutrition, hydration, elimination, sleep and activity  6. If unable to ensure safety without constant attention obtain sitter and review sitter guidelines with assigned personnel  7. Initiate Psychosocial CNS and Spiritual Care consult, as indicated  3/8/2025 0448 by Ana Jackman RN  Outcome: Progressing  3/7/2025 1938 by Mery Wellington RN  Outcome: Progressing     Problem: Safety - Medical Restraint  Goal: Remains free of injury from restraints (Restraint for Interference with Medical Device)  Description: INTERVENTIONS:  1. Determine that other, less restrictive measures have been tried or would not be effective before applying the restraint  2. Evaluate the patient's condition at the time of restraint application  3. Inform patient/family regarding the reason for restraint  4. Q2H: Monitor safety, psychosocial status, comfort, nutrition and hydration  3/8/2025 0448 by Ana Jackman RN  Outcome: Progressing  3/7/2025 1938 by Mery Wellington RN  Outcome: Progressing  Flowsheets (Taken 3/7/2025 0800 by Lizzeth Woodruff RN)  Remains free of injury from restraints (restraint for interference with medical device):   Evaluate the patient's condition at the time of restraint application   Determine that other, less restrictive measures have been tried or would not be effective before applying the restraint   Inform patient/family regarding the reason for restraint   Every 2 hours: Monitor safety, psychosocial status, comfort, nutrition and hydration     Problem: Pain  Goal: Verbalizes/displays adequate comfort level or baseline comfort level  3/8/2025 0448 by Ana Jackman RN  Outcome: Progressing  3/7/2025 1938 by Mery Wellington RN  Outcome: Progressing     Problem: Nutrition Deficit:  Goal: Optimize nutritional status  3/8/2025 0448 by Ana Jackman RN  Outcome: Progressing  3/7/2025 1938 by Mery Wellington RN  Outcome: Progressing

## 2025-03-08 NOTE — PROGRESS NOTES
[facility]      Progress Note    Patient:  Leny Stone  YOB: 1950  Date of Service: 3/8/2025  MRN: [id number]   Acct: [id number]   Primary Care Physician: Regina Curtis MD  Advance Directive: Full Code  Admit Date: 3/6/2025       Hospital Day: 2    Portions of this note have been copied forward, however, updated to reflect the most current clinical status of this patient.     CHIEF COMPLAINT Altered Mental Status     SUBJECTIVE:  Ms. Stone was resting in bed this morning. Continues to report left sided chest wall pain, increased pain with movement and deep breathing. Denies shortness of breath.       CUMULATIVE HOSPITAL COURSE:   The patient is a 74 y.o. female with PMH of Afib on Coumadin, lupus anticoagulant disorder, HTN, CHF, CKD 3, T2DM, hypothyroidism and anxiety who presented to [facility] ED for evaluation of altered mental status. Reportedly was brought in via EMS after son-in-law found her to have altered mental status. CODE stroke was called on arrival to ED. Per family patient does have history of medication noncompliance at times. Patient is supposed to wear a CPAP at night although she does not wear it sometimes. Patient was found to be lethargic on admission and only answered \"yeah\" or \"okay\". Workup in ED revealed CT head with no acute intracranial hemorrhage no acute findings; CTA head neck with no extracranial carotid or vertebral artery stenosis, no large vessel occlusion or high-grade stenosis within the [place]; CT brain perfusion with no acute infarct or ischemia; CT chest with no acute findings; CT abdomen and pelvis with no abdominal pelvic acute or suspicious process, inferior vena cava [place] filter third, bilateral lower lungs mild subsegmental atelectasis; WBC 7.2, H&H 11.9/37.2, platelets 88, glucose 68-->163-->116, troponin 29, lactic acid 1.8, creatinine 1.6, BUN 19, GFR 33. Patient was admitted to hospital medicine for further evaluation with neurology  consultation. MRI brain and EEG ordered. Score 7 out of 30 on cognitive evaluation. Complained of chest pain this morning. EKG obtained, unremarkable. Troponin ordered 37 followed by 38. Cardiology consulted. ECHO was obtained which indicated normal LV size with EF of 60 to 65%, mildly increased wall thickness, normal wall motion, MR, LA severely dilated, negative bubble study.  Noted to have subtherapeutic INR, Lovenox bridge with Coumadin initiated. Cardiology recommended resuming home Bumex and no further intervention from cardiac standpoint and follow up with Dr. Ayon outpatient. Ddimer noted to be elevated. VQ scan negative. EEG abnormal due to mild diffuse background slowing, nonspecific per neurology, no clear epileptiform activity noted. MRI brain negative for acute findings. Neurology suggested possible TIA, recommended continuing full anticoagulation. Discussed with patient and family importance of Coumadin compliance given history of DVT and PE.       Review of Systems   Constitutional:  Negative for chills, diaphoresis, fatigue and fever.   HENT:  Negative for congestion, ear pain, sinus pain, sore throat and trouble swallowing.    Eyes:  Negative for visual disturbance.   Respiratory:  Negative for cough, shortness of breath and wheezing.    Cardiovascular:  Positive for chest pain. Negative for palpitations and leg swelling.        Left sided chest wall pain with movement     Gastrointestinal:  Negative for abdominal distention, abdominal pain, blood in stool, constipation, diarrhea, nausea and vomiting.   Endocrine: Negative for cold intolerance and heat intolerance.   Genitourinary:  Negative for difficulty urinating, flank pain, frequency and urgency.   Musculoskeletal:  Negative for arthralgias and myalgias.   Neurological:  Negative for dizziness, syncope, weakness, light-headedness, numbness and headaches.   Hematological:  Does not bruise/bleed easily.   Psychiatric/Behavioral:  Negative  troponin 37 --> 38  - HgbA1c 5.5   - Serial and PRN EKGs  - Monitor on telemetry  - Ddimer >20.00   - VQ scan Negative        Chronic renal disease, stage III (HCC) [341744]-    - Unable to obtain labs               - Monitor I's and O's closely              - Monitor labs               - Avoid hypotension              - Avoid nephrotoxic agents       Chronic obstructive lung disease (HCC)-    - supplemental oxygen as needed      Paroxysmal atrial fibrillation (HCC)/ history of Lupus anticoagulant disorder/ Subtherapeutic INR/ History of DVT (deep vein thrombosis) on Coumadin -    - Pharmacy to dose coumadin as appropriate    - INR 1.3 per nursing per home monitor    - Continue Lovenox 1mg/kg BID to bridge with coumadin    - Daily INR    - Monitor on telemetry       Gastroesophageal reflux disease-    - Continue Protonix       Hypertensive disorder-    - Resume home Lopressor    - Monitor BP       Hypothyroidism-    - Continue Synthroid       Hyperlipidemia-   - Continue statin       Anxiety-   - Continue Lexapro       Neuropathy-    - HOLD gabapentin given AMS    - Family says patient does not take her medications as prescribed           DVT Prophylaxis: Lovenox, Coumadin      GI prophylaxis:  Protonix      Discharge planning: TBD        Further Orders per Clinical course/attending.     Electronically signed by CHANELLE Tate CNP on 3/8/2025 at 4:48 PM       EMR Dragon/Transcription disclaimer:   Much of this encounter note is an electronic transcription/translation of spoken language to printed text. The electronic translation of spoken language may permit erroneous, or at times, nonsensical words or phrases to be inadvertently transcribed; although attempts have made to review the note for such errors, some may still exist.

## 2025-03-08 NOTE — PROGRESS NOTES
Cardiology Progress Note Satinder Segura MD      Patient:  Leny Stone  [id number]    Patient Active Problem List    Diagnosis Date Noted    Fibromyalgia 03/14/2014     Priority: High    Paroxysmal atrial fibrillation (HCC) 08/29/2022     Priority: Medium    Paroxysmal SVT (supraventricular tachycardia) 08/29/2022     Priority: Medium    SSS (sick sinus syndrome) ([facility]) 08/29/2022     Priority: Medium    Acute kidney injury superimposed on chronic kidney disease 08/17/2022     Priority: Medium    Chronic obstructive lung disease ([facility]) 05/24/2022     Priority: Medium    Chronic renal disease, stage III ([facility]) [650114] 04/24/2022     Priority: Medium    Altered mental status 03/06/2025    Anxiety     Neuropathy     Somnolence, daytime 03/18/2024    Snoring 03/18/2024    Witnessed apneic spells 03/18/2024    Difficulty using biphasic positive airway pressure (BiPAP) machine 03/18/2024    Atrial fibrillation with rapid ventricular response ([facility]) 02/08/2024    Chest pain, unspecified 02/08/2024    Hypertension 02/08/2024    Subtherapeutic international normalized ratio (INR) 02/08/2024    Unspecified severe protein-calorie malnutrition 01/18/2022    H/O systemic lupus erythematosus (SLE) ([facility]) 01/18/2022    Type II diabetes mellitus with nephropathy ([facility]) 01/18/2022    Stable angina 01/18/2022    Stage 3a chronic kidney disease ([facility]) 01/18/2022    Chronic deep vein thrombosis (DVT) of proximal vein of lower extremity ([facility]) 10/12/2021    Nonrheumatic mitral valve regurgitation 08/16/2021     Overview Note:     Last Assessment & Plan:   Formatting of this note might be different from the original.  Moderate. Needs a repeat echo      Skin ulcer of upper arm, with fat layer exposed ([facility]) 05/04/2021    Dog bite 04/29/2021    Cellulitis of right upper extremity 04/29/2021    Abscess of right arm 04/29/2021    Soft tissue infection 04/29/2021    Lower abdominal pain 04/07/2021    Chronic heartburn 04/07/2021    S/P gastric bypass

## 2025-03-09 VITALS
DIASTOLIC BLOOD PRESSURE: 66 MMHG | RESPIRATION RATE: 16 BRPM | HEIGHT: 67 IN | SYSTOLIC BLOOD PRESSURE: 118 MMHG | TEMPERATURE: 98.1 F | BODY MASS INDEX: 37.67 KG/M2 | OXYGEN SATURATION: 96 % | WEIGHT: 240 LBS | HEART RATE: 60 BPM

## 2025-03-09 LAB
ANION GAP SERPL CALCULATED.3IONS-SCNC: 12 MMOL/L (ref 8–16)
BASOPHILS # BLD: 0 K/UL (ref 0–0.2)
BASOPHILS NFR BLD: 0.3 % (ref 0–1)
BUN SERPL-MCNC: 23 MG/DL (ref 8–23)
CALCIUM SERPL-MCNC: 8.5 MG/DL (ref 8.8–10.2)
CHLORIDE SERPL-SCNC: 100 MMOL/L (ref 98–107)
CO2 SERPL-SCNC: 22 MMOL/L (ref 22–29)
CREAT SERPL-MCNC: 1.8 MG/DL (ref 0.5–0.9)
EKG P AXIS: -13 DEGREES
EKG P-R INTERVAL: 148 MS
EKG Q-T INTERVAL: 428 MS
EKG QRS DURATION: 114 MS
EKG QTC CALCULATION (BAZETT): 463 MS
EKG T AXIS: -134 DEGREES
EOSINOPHIL # BLD: 0.1 K/UL (ref 0–0.6)
EOSINOPHIL NFR BLD: 1.4 % (ref 0–5)
ERYTHROCYTE [DISTWIDTH] IN BLOOD BY AUTOMATED COUNT: 15.5 % (ref 11.5–14.5)
GLUCOSE BLD-MCNC: 110 MG/DL (ref 70–99)
GLUCOSE BLD-MCNC: 114 MG/DL (ref 70–99)
GLUCOSE BLD-MCNC: 84 MG/DL (ref 70–99)
GLUCOSE BLD-MCNC: 96 MG/DL (ref 70–99)
GLUCOSE SERPL-MCNC: 99 MG/DL (ref 70–99)
HCT VFR BLD AUTO: 34.3 % (ref 37–47)
HGB BLD-MCNC: 10.7 G/DL (ref 12–16)
IMM GRANULOCYTES # BLD: 0.1 K/UL
INR PPP: 1.3 (ref 0.88–1.18)
LYMPHOCYTES # BLD: 2.2 K/UL (ref 1.1–4.5)
LYMPHOCYTES NFR BLD: 22.9 % (ref 20–40)
MCH RBC QN AUTO: 29.5 PG (ref 27–31)
MCHC RBC AUTO-ENTMCNC: 31.2 G/DL (ref 33–37)
MCV RBC AUTO: 94.5 FL (ref 81–99)
MONOCYTES # BLD: 1.5 K/UL (ref 0–0.9)
MONOCYTES NFR BLD: 15.2 % (ref 0–10)
NEUTROPHILS # BLD: 5.6 K/UL (ref 1.5–7.5)
NEUTS SEG NFR BLD: 59.4 % (ref 50–65)
PERFORMED ON: ABNORMAL
PERFORMED ON: ABNORMAL
PERFORMED ON: NORMAL
PERFORMED ON: NORMAL
PLATELET # BLD AUTO: 111 K/UL (ref 130–400)
PMV BLD AUTO: 12.9 FL (ref 9.4–12.3)
POTASSIUM SERPL-SCNC: 3.6 MMOL/L (ref 3.5–5)
PROTHROMBIN TIME: 15.8 SEC (ref 12–14.6)
RBC # BLD AUTO: 3.63 M/UL (ref 4.2–5.4)
SODIUM SERPL-SCNC: 134 MMOL/L (ref 136–145)
WBC # BLD AUTO: 9.5 K/UL (ref 4.8–10.8)

## 2025-03-09 PROCEDURE — 6370000000 HC RX 637 (ALT 250 FOR IP)

## 2025-03-09 PROCEDURE — 6370000000 HC RX 637 (ALT 250 FOR IP): Performed by: STUDENT IN AN ORGANIZED HEALTH CARE EDUCATION/TRAINING PROGRAM

## 2025-03-09 PROCEDURE — 94640 AIRWAY INHALATION TREATMENT: CPT

## 2025-03-09 PROCEDURE — 36415 COLL VENOUS BLD VENIPUNCTURE: CPT

## 2025-03-09 PROCEDURE — 93010 ELECTROCARDIOGRAM REPORT: CPT | Performed by: INTERNAL MEDICINE

## 2025-03-09 PROCEDURE — 94760 N-INVAS EAR/PLS OXIMETRY 1: CPT

## 2025-03-09 PROCEDURE — 1200000000 HC SEMI PRIVATE

## 2025-03-09 PROCEDURE — 85610 PROTHROMBIN TIME: CPT

## 2025-03-09 PROCEDURE — 82962 GLUCOSE BLOOD TEST: CPT

## 2025-03-09 PROCEDURE — 6370000000 HC RX 637 (ALT 250 FOR IP): Performed by: INTERNAL MEDICINE

## 2025-03-09 PROCEDURE — 85025 COMPLETE CBC W/AUTO DIFF WBC: CPT

## 2025-03-09 PROCEDURE — 6360000002 HC RX W HCPCS: Performed by: NURSE PRACTITIONER

## 2025-03-09 PROCEDURE — 2500000003 HC RX 250 WO HCPCS

## 2025-03-09 PROCEDURE — 80048 BASIC METABOLIC PNL TOTAL CA: CPT

## 2025-03-09 PROCEDURE — 6370000000 HC RX 637 (ALT 250 FOR IP): Performed by: NURSE PRACTITIONER

## 2025-03-09 PROCEDURE — 2580000003 HC RX 258: Performed by: NURSE PRACTITIONER

## 2025-03-09 PROCEDURE — 99232 SBSQ HOSP IP/OBS MODERATE 35: CPT | Performed by: INTERNAL MEDICINE

## 2025-03-09 RX ORDER — 0.9 % SODIUM CHLORIDE 0.9 %
250 INTRAVENOUS SOLUTION INTRAVENOUS ONCE
Status: COMPLETED | OUTPATIENT
Start: 2025-03-09 | End: 2025-03-09

## 2025-03-09 RX ORDER — GABAPENTIN 300 MG/1
300 CAPSULE ORAL 2 TIMES DAILY
Status: DISCONTINUED | OUTPATIENT
Start: 2025-03-09 | End: 2025-03-12 | Stop reason: DRUGHIGH

## 2025-03-09 RX ADMIN — SUCRALFATE 1 G: 1 TABLET ORAL at 05:13

## 2025-03-09 RX ADMIN — SODIUM BICARBONATE 650 MG: 650 TABLET ORAL at 21:13

## 2025-03-09 RX ADMIN — METOCLOPRAMIDE 5 MG: 10 TABLET ORAL at 09:07

## 2025-03-09 RX ADMIN — METOCLOPRAMIDE 5 MG: 10 TABLET ORAL at 05:13

## 2025-03-09 RX ADMIN — PANTOPRAZOLE SODIUM 40 MG: 40 TABLET, DELAYED RELEASE ORAL at 05:13

## 2025-03-09 RX ADMIN — PANTOPRAZOLE SODIUM 40 MG: 40 TABLET, DELAYED RELEASE ORAL at 17:39

## 2025-03-09 RX ADMIN — AMIODARONE HYDROCHLORIDE 100 MG: 200 TABLET ORAL at 09:06

## 2025-03-09 RX ADMIN — DICLOFENAC SODIUM 2 G: 10 GEL TOPICAL at 21:16

## 2025-03-09 RX ADMIN — METOCLOPRAMIDE 5 MG: 10 TABLET ORAL at 21:13

## 2025-03-09 RX ADMIN — ENOXAPARIN SODIUM 105 MG: 150 INJECTION SUBCUTANEOUS at 21:17

## 2025-03-09 RX ADMIN — DICLOFENAC SODIUM 2 G: 10 GEL TOPICAL at 09:07

## 2025-03-09 RX ADMIN — SUCRALFATE 1 G: 1 TABLET ORAL at 17:39

## 2025-03-09 RX ADMIN — GABAPENTIN 300 MG: 300 CAPSULE ORAL at 13:18

## 2025-03-09 RX ADMIN — GABAPENTIN 300 MG: 300 CAPSULE ORAL at 21:14

## 2025-03-09 RX ADMIN — TIOTROPIUM BROMIDE AND OLODATEROL 2 PUFF: 3.124; 2.736 SPRAY, METERED RESPIRATORY (INHALATION) at 07:00

## 2025-03-09 RX ADMIN — SODIUM BICARBONATE 650 MG: 650 TABLET ORAL at 09:06

## 2025-03-09 RX ADMIN — SUCRALFATE 1 G: 1 TABLET ORAL at 21:13

## 2025-03-09 RX ADMIN — ENOXAPARIN SODIUM 105 MG: 150 INJECTION SUBCUTANEOUS at 09:07

## 2025-03-09 RX ADMIN — WARFARIN SODIUM 6 MG: 5 TABLET ORAL at 17:39

## 2025-03-09 RX ADMIN — SODIUM CHLORIDE, PRESERVATIVE FREE 10 ML: 5 INJECTION INTRAVENOUS at 09:09

## 2025-03-09 RX ADMIN — CALCITRIOL CAPSULES 0.25 MCG 0.25 MCG: 0.25 CAPSULE ORAL at 09:07

## 2025-03-09 RX ADMIN — GABAPENTIN 300 MG: 300 CAPSULE ORAL at 09:06

## 2025-03-09 RX ADMIN — WARFARIN SODIUM 5 MG: 5 TABLET ORAL at 01:17

## 2025-03-09 RX ADMIN — SODIUM CHLORIDE, PRESERVATIVE FREE 10 ML: 5 INJECTION INTRAVENOUS at 23:27

## 2025-03-09 RX ADMIN — METOCLOPRAMIDE 5 MG: 10 TABLET ORAL at 17:39

## 2025-03-09 RX ADMIN — ESCITALOPRAM OXALATE 20 MG: 10 TABLET ORAL at 09:06

## 2025-03-09 RX ADMIN — SODIUM CHLORIDE 250 ML: 9 INJECTION, SOLUTION INTRAVENOUS at 17:44

## 2025-03-09 RX ADMIN — SUCRALFATE 1 G: 1 TABLET ORAL at 09:06

## 2025-03-09 RX ADMIN — METOPROLOL TARTRATE 25 MG: 25 TABLET, FILM COATED ORAL at 09:07

## 2025-03-09 RX ADMIN — ROSUVASTATIN CALCIUM 40 MG: 20 TABLET, FILM COATED ORAL at 09:06

## 2025-03-09 RX ADMIN — LEVOTHYROXINE SODIUM 100 MCG: 100 TABLET ORAL at 05:13

## 2025-03-09 RX ADMIN — BUMETANIDE 1 MG: 1 TABLET ORAL at 09:06

## 2025-03-09 RX ADMIN — ASPIRIN 81 MG: 81 TABLET, CHEWABLE ORAL at 09:06

## 2025-03-09 NOTE — PROGRESS NOTES
4 Eyes Skin Assessment     NAME:  Leny Stone  YOB: 1950  MEDICAL RECORD NUMBER:  [id number]    The patient is being assessed for  Admission    I agree that at least one RN has performed a thorough Head to Toe Skin Assessment on the patient. ALL assessment sites listed below have been assessed.      Areas assessed by both nurses:    Head, Face, Ears, Shoulders, Back, Chest, Arms, Elbows, Hands, Sacrum. Buttock, Coccyx, Ischium, Legs. Feet and Heels, and Under Medical Devices         Does the Patient have a Wound? No noted wound(s)       Prudencio Prevention initiated by RN: No  Wound Care Orders initiated by RN: No    Pressure Injury (Stage 3,4, Unstageable, DTI, NWPT, and Complex wounds) if present, place Wound referral order by RN under : No    New Ostomies, if present place, Ostomy referral order under : No     Nurse 1 eSignature: Electronically signed by Nikki Goode LPN on 3/9/25 at 4:44 AM CDT    **SHARE this note so that the co-signing nurse can place an eSignature**    Nurse 2 eSignature: {Esignature:[id number]}

## 2025-03-09 NOTE — PLAN OF CARE
Problem: Skin/Tissue Integrity  Goal: Skin integrity remains intact  Description: 1.  Monitor for areas of redness and/or skin breakdown  2.  Assess vascular access sites hourly  3.  Every 4-6 hours minimum:  Change oxygen saturation probe site  4.  Every 4-6 hours:  If on nasal continuous positive airway pressure, respiratory therapy assess nares and determine need for appliance change or resting period  3/8/2025 2023 by Nikki Goode LPN  Outcome: Progressing  Flowsheets  Taken 3/8/2025 2022  Skin Integrity Remains Intact: Monitor for areas of redness and/or skin breakdown  Taken 3/8/2025 2015  Skin Integrity Remains Intact: Monitor for areas of redness and/or skin breakdown  3/8/2025 1644 by Michelle Sosa, RN  Outcome: Progressing  Flowsheets (Taken 3/8/2025 0820)  Skin Integrity Remains Intact: Monitor for areas of redness and/or skin breakdown     Problem: Safety - Adult  Goal: Free from fall injury  3/8/2025 2023 by Nikki Goode LPN  Outcome: Progressing  Flowsheets (Taken 3/8/2025 2022)  Free From Fall Injury: Instruct family/caregiver on patient safety  3/8/2025 1644 by Michelle Sosa, RN  Outcome: Progressing

## 2025-03-09 NOTE — PROGRESS NOTES
Cardiology Progress Note Satinder Segura MD      Patient:  Leny Stone  [id number]    Patient Active Problem List    Diagnosis Date Noted    Fibromyalgia 03/14/2014     Priority: High    Paroxysmal atrial fibrillation (HCC) 08/29/2022     Priority: Medium    Paroxysmal SVT (supraventricular tachycardia) 08/29/2022     Priority: Medium    SSS (sick sinus syndrome) ([facility]) 08/29/2022     Priority: Medium    Acute kidney injury superimposed on chronic kidney disease 08/17/2022     Priority: Medium    Chronic obstructive lung disease ([facility]) 05/24/2022     Priority: Medium    Chronic renal disease, stage III ([facility]) [110452] 04/24/2022     Priority: Medium    Altered mental status 03/06/2025    Anxiety     Neuropathy     Somnolence, daytime 03/18/2024    Snoring 03/18/2024    Witnessed apneic spells 03/18/2024    Difficulty using biphasic positive airway pressure (BiPAP) machine 03/18/2024    Atrial fibrillation with rapid ventricular response ([facility]) 02/08/2024    Chest pain, unspecified 02/08/2024    Hypertension 02/08/2024    Subtherapeutic international normalized ratio (INR) 02/08/2024    Unspecified severe protein-calorie malnutrition 01/18/2022    H/O systemic lupus erythematosus (SLE) ([facility]) 01/18/2022    Type II diabetes mellitus with nephropathy ([facility]) 01/18/2022    Stable angina 01/18/2022    Stage 3a chronic kidney disease ([facility]) 01/18/2022    Chronic deep vein thrombosis (DVT) of proximal vein of lower extremity ([facility]) 10/12/2021    Nonrheumatic mitral valve regurgitation 08/16/2021     Overview Note:     Last Assessment & Plan:   Formatting of this note might be different from the original.  Moderate. Needs a repeat echo      Skin ulcer of upper arm, with fat layer exposed ([facility]) 05/04/2021    Dog bite 04/29/2021    Cellulitis of right upper extremity 04/29/2021    Abscess of right arm 04/29/2021    Soft tissue infection 04/29/2021    Lower abdominal pain 04/07/2021    Chronic heartburn 04/07/2021    S/P gastric bypass  FINDINGS: There is a normal and physiologic distribution of the radiotracer.      Normal perfusion lung scan.   ______________________________________ Electronically signed by: HERBIE KOLB M.D. Date:     03/08/2025 Time:    08:26     MRI BRAIN WO CONTRAST  Result Date: 3/7/2025  EXAM:  BRAIN MRI WITHOUT CONTRAST  HISTORY:  Altered mental status.  TECHNIQUE:  Multiplanar and multisequence MRI of the brain without the administration of intravenous contrast.  COMPARISON:  Same day brain CT perfusion. Brain MRI dated 10/18/2024.  FINDINGS:  There is no intracranial mass.  There is no acute ischemic infarct or acute intracranial hemorrhage.  There is moderate cerebral parenchymal volume loss.  There is no hydrocephalus.  Scattered foci of T2/FLAIR hyperintense signal are present in the subcortical and periventricular white matter, most commonly seen in chronic white matter microvascular ischemic changes.  The basilar cisterns are patent.  The posterior fossa structures are within normal limits.  There is a partially empty sella.  The paranasal sinuses and mastoid air cells are well aerated.  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 There has been prior left cataract surgery.  No calvarial abnormality is identified.       1. No acute intracranial findings. 2. Moderate cerebral atrophy. 3. Moderate chronic white matter microvascular ischemic changes. 4. Partially empty sella.   ______________________________________ Electronically signed by: KATE GREGORY M.D. Date:     03/07/2025 Time:    15:42     Echo (TTE) complete (PRN contrast/bubble/strain/3D)  Result Date: 3/6/2025    Left Ventricle: Normal left ventricular systolic function with a visually estimated EF of 60 - 65%. Left ventricle size is normal. Mildly increased wall thickness. Normal wall motion.   Right Ventricle: Right ventricle size is normal. Normal systolic function. TAPSE is 2.4 cm.   Aortic Valve: Trileaflet valve. No cusp thickening. No regurgitation. No stenosis.

## 2025-03-09 NOTE — PROGRESS NOTES
Clinical Pharmacy Note    Warfarin consult follow-up    Recent Labs     03/09/25  0146   INR 1.30*     Recent Labs     03/07/25  0912 03/08/25  2207 03/09/25  0146   HGB 13.1 10.7* 10.7*   HCT 40.2 33.8* 34.3*   PLT 89* 113* 111*       Current warfarin drug-drug interactions:      Amiodarone-Monitor patients extra closely for evidence of increased anticoagulant effects if amiodarone is initiated/dose increased, or decreased effects if amiodarone is discontinued/dose decreased, though due to the long half-life of amiodarone, any interaction may persist for several days/weeks following any dose decrease or discontinuation.   Carafate-Specifically, sucralfate may decrease the absorption of Vitamin K Antagonists.   Crestor-HMG-CoA Reductase Inhibitors (Statins) may enhance the anticoagulant effect of Vitamin K Antagonists.   Lexapro- Closely monitor patients for clinical and laboratory evidence of bleeding if these agents are combined.  Synthroid-Monitor for increased anticoagulant effects of vitamin K antagonists if a thyroid product is initiated/dose increased, or decreased effects if a thyroid product is discontinued/dose decreased.      Lovenox 1mg/kg q 12 hours ordered         Date INR Warfarin Dose   03/06/25 1.39 5 mg ordered but not taken   03/07/25   1.19 5 mg     03/08/25   1.24 5 mg     03/08/25   1.30 6 mg                                   Notes:                     Give 6 mg warfarin PO tonight.    Daily PT/INR until stable within therapeutic range.     Electronically signed by AMI ZHANG RPH on 3/9/2025 at 4:24 AM

## 2025-03-10 LAB
ANION GAP SERPL CALCULATED.3IONS-SCNC: 13 MMOL/L (ref 8–16)
BASOPHILS # BLD: 0 K/UL (ref 0–0.2)
BASOPHILS NFR BLD: 0.4 % (ref 0–1)
BNP BLD-MCNC: 319 PG/ML (ref 0–449)
BUN SERPL-MCNC: 31 MG/DL (ref 8–23)
CALCIUM SERPL-MCNC: 8.6 MG/DL (ref 8.8–10.2)
CHLORIDE SERPL-SCNC: 98 MMOL/L (ref 98–107)
CO2 SERPL-SCNC: 20 MMOL/L (ref 22–29)
CREAT SERPL-MCNC: 2.5 MG/DL (ref 0.5–0.9)
EOSINOPHIL # BLD: 0.2 K/UL (ref 0–0.6)
EOSINOPHIL NFR BLD: 1.4 % (ref 0–5)
ERYTHROCYTE [DISTWIDTH] IN BLOOD BY AUTOMATED COUNT: 15.5 % (ref 11.5–14.5)
GLUCOSE BLD-MCNC: 127 MG/DL (ref 70–99)
GLUCOSE BLD-MCNC: 37 MG/DL (ref 70–99)
GLUCOSE BLD-MCNC: 38 MG/DL (ref 70–99)
GLUCOSE BLD-MCNC: 81 MG/DL (ref 70–99)
GLUCOSE BLD-MCNC: 93 MG/DL (ref 70–99)
GLUCOSE SERPL-MCNC: 98 MG/DL (ref 70–99)
HCT VFR BLD AUTO: 32.7 % (ref 37–47)
HGB BLD-MCNC: 10.3 G/DL (ref 12–16)
IMM GRANULOCYTES # BLD: 0.1 K/UL
INR PPP: 1.49 (ref 0.88–1.18)
LYMPHOCYTES # BLD: 2.5 K/UL (ref 1.1–4.5)
LYMPHOCYTES NFR BLD: 22.4 % (ref 20–40)
MCH RBC QN AUTO: 29.5 PG (ref 27–31)
MCHC RBC AUTO-ENTMCNC: 31.5 G/DL (ref 33–37)
MCV RBC AUTO: 93.7 FL (ref 81–99)
MONOCYTES # BLD: 0.9 K/UL (ref 0–0.9)
MONOCYTES NFR BLD: 8.4 % (ref 0–10)
NEUTROPHILS # BLD: 7.4 K/UL (ref 1.5–7.5)
NEUTS SEG NFR BLD: 66.7 % (ref 50–65)
PERFORMED ON: ABNORMAL
PERFORMED ON: NORMAL
PERFORMED ON: NORMAL
PLATELET # BLD AUTO: 149 K/UL (ref 130–400)
PMV BLD AUTO: 13.7 FL (ref 9.4–12.3)
POTASSIUM SERPL-SCNC: 4.1 MMOL/L (ref 3.5–5)
PROTHROMBIN TIME: 17.6 SEC (ref 12–14.6)
RBC # BLD AUTO: 3.49 M/UL (ref 4.2–5.4)
SODIUM SERPL-SCNC: 131 MMOL/L (ref 136–145)
WBC # BLD AUTO: 11.1 K/UL (ref 4.8–10.8)

## 2025-03-10 PROCEDURE — 6360000002 HC RX W HCPCS: Performed by: NURSE PRACTITIONER

## 2025-03-10 PROCEDURE — 2500000003 HC RX 250 WO HCPCS

## 2025-03-10 PROCEDURE — 94760 N-INVAS EAR/PLS OXIMETRY 1: CPT

## 2025-03-10 PROCEDURE — 97530 THERAPEUTIC ACTIVITIES: CPT

## 2025-03-10 PROCEDURE — 6370000000 HC RX 637 (ALT 250 FOR IP): Performed by: NURSE PRACTITIONER

## 2025-03-10 PROCEDURE — 83880 ASSAY OF NATRIURETIC PEPTIDE: CPT

## 2025-03-10 PROCEDURE — 80048 BASIC METABOLIC PNL TOTAL CA: CPT

## 2025-03-10 PROCEDURE — 2580000003 HC RX 258

## 2025-03-10 PROCEDURE — 6360000002 HC RX W HCPCS

## 2025-03-10 PROCEDURE — 82962 GLUCOSE BLOOD TEST: CPT

## 2025-03-10 PROCEDURE — 6370000000 HC RX 637 (ALT 250 FOR IP)

## 2025-03-10 PROCEDURE — 85025 COMPLETE CBC W/AUTO DIFF WBC: CPT

## 2025-03-10 PROCEDURE — 94640 AIRWAY INHALATION TREATMENT: CPT

## 2025-03-10 PROCEDURE — 1200000000 HC SEMI PRIVATE

## 2025-03-10 PROCEDURE — 36415 COLL VENOUS BLD VENIPUNCTURE: CPT

## 2025-03-10 PROCEDURE — 85610 PROTHROMBIN TIME: CPT

## 2025-03-10 PROCEDURE — 2580000003 HC RX 258: Performed by: NURSE PRACTITIONER

## 2025-03-10 PROCEDURE — 99232 SBSQ HOSP IP/OBS MODERATE 35: CPT | Performed by: PSYCHIATRY & NEUROLOGY

## 2025-03-10 PROCEDURE — 97535 SELF CARE MNGMENT TRAINING: CPT

## 2025-03-10 RX ORDER — ACETAMINOPHEN 325 MG/1
650 TABLET ORAL EVERY 4 HOURS PRN
Status: DISCONTINUED | OUTPATIENT
Start: 2025-03-10 | End: 2025-03-18 | Stop reason: HOSPADM

## 2025-03-10 RX ORDER — PROCHLORPERAZINE EDISYLATE 5 MG/ML
5 INJECTION INTRAMUSCULAR; INTRAVENOUS ONCE
Status: COMPLETED | OUTPATIENT
Start: 2025-03-10 | End: 2025-03-10

## 2025-03-10 RX ORDER — 0.9 % SODIUM CHLORIDE 0.9 %
250 INTRAVENOUS SOLUTION INTRAVENOUS ONCE
Status: COMPLETED | OUTPATIENT
Start: 2025-03-10 | End: 2025-03-10

## 2025-03-10 RX ORDER — DEXTROSE MONOHYDRATE 100 MG/ML
INJECTION, SOLUTION INTRAVENOUS CONTINUOUS PRN
Status: DISCONTINUED | OUTPATIENT
Start: 2025-03-10 | End: 2025-03-18 | Stop reason: HOSPADM

## 2025-03-10 RX ORDER — GLUCAGON 1 MG/ML
1 KIT INJECTION PRN
Status: DISCONTINUED | OUTPATIENT
Start: 2025-03-10 | End: 2025-03-18 | Stop reason: HOSPADM

## 2025-03-10 RX ORDER — DEXTROSE MONOHYDRATE 50 MG/ML
INJECTION, SOLUTION INTRAVENOUS
Status: DISPENSED
Start: 2025-03-10 | End: 2025-03-11

## 2025-03-10 RX ADMIN — METOCLOPRAMIDE 5 MG: 10 TABLET ORAL at 17:41

## 2025-03-10 RX ADMIN — ACETAMINOPHEN 650 MG: 325 TABLET ORAL at 16:20

## 2025-03-10 RX ADMIN — TIOTROPIUM BROMIDE AND OLODATEROL 2 PUFF: 3.124; 2.736 SPRAY, METERED RESPIRATORY (INHALATION) at 07:52

## 2025-03-10 RX ADMIN — ENOXAPARIN SODIUM 105 MG: 150 INJECTION SUBCUTANEOUS at 09:50

## 2025-03-10 RX ADMIN — SODIUM CHLORIDE 250 ML: 9 INJECTION, SOLUTION INTRAVENOUS at 07:42

## 2025-03-10 RX ADMIN — DICLOFENAC SODIUM 2 G: 10 GEL TOPICAL at 21:03

## 2025-03-10 RX ADMIN — DEXTROSE MONOHYDRATE 250 ML: 10 INJECTION, SOLUTION INTRAVENOUS at 21:02

## 2025-03-10 RX ADMIN — WARFARIN SODIUM 6 MG: 5 TABLET ORAL at 17:42

## 2025-03-10 RX ADMIN — CALCITRIOL CAPSULES 0.25 MCG 0.25 MCG: 0.25 CAPSULE ORAL at 09:49

## 2025-03-10 RX ADMIN — ASPIRIN 81 MG: 81 TABLET, CHEWABLE ORAL at 09:50

## 2025-03-10 RX ADMIN — ROSUVASTATIN CALCIUM 40 MG: 20 TABLET, FILM COATED ORAL at 09:59

## 2025-03-10 RX ADMIN — LEVOTHYROXINE SODIUM 100 MCG: 100 TABLET ORAL at 05:47

## 2025-03-10 RX ADMIN — GLUCAGON 1 MG: KIT at 20:38

## 2025-03-10 RX ADMIN — SUCRALFATE 1 G: 1 TABLET ORAL at 05:47

## 2025-03-10 RX ADMIN — METOCLOPRAMIDE 5 MG: 10 TABLET ORAL at 05:47

## 2025-03-10 RX ADMIN — DICLOFENAC SODIUM 2 G: 10 GEL TOPICAL at 09:51

## 2025-03-10 RX ADMIN — SUCRALFATE 1 G: 1 TABLET ORAL at 11:21

## 2025-03-10 RX ADMIN — ENOXAPARIN SODIUM 105 MG: 150 INJECTION SUBCUTANEOUS at 21:03

## 2025-03-10 RX ADMIN — ESCITALOPRAM OXALATE 20 MG: 10 TABLET ORAL at 09:55

## 2025-03-10 RX ADMIN — PROCHLORPERAZINE EDISYLATE 5 MG: 5 INJECTION INTRAMUSCULAR; INTRAVENOUS at 18:22

## 2025-03-10 RX ADMIN — GABAPENTIN 300 MG: 300 CAPSULE ORAL at 09:49

## 2025-03-10 RX ADMIN — SODIUM BICARBONATE 650 MG: 650 TABLET ORAL at 09:50

## 2025-03-10 RX ADMIN — PANTOPRAZOLE SODIUM 40 MG: 40 TABLET, DELAYED RELEASE ORAL at 05:47

## 2025-03-10 RX ADMIN — PANTOPRAZOLE SODIUM 40 MG: 40 TABLET, DELAYED RELEASE ORAL at 16:00

## 2025-03-10 RX ADMIN — SODIUM CHLORIDE, PRESERVATIVE FREE 10 ML: 5 INJECTION INTRAVENOUS at 09:55

## 2025-03-10 RX ADMIN — SUCRALFATE 1 G: 1 TABLET ORAL at 17:41

## 2025-03-10 RX ADMIN — METOCLOPRAMIDE 5 MG: 10 TABLET ORAL at 11:20

## 2025-03-10 RX ADMIN — ONDANSETRON 4 MG: 2 INJECTION, SOLUTION INTRAMUSCULAR; INTRAVENOUS at 15:59

## 2025-03-10 RX ADMIN — AMIODARONE HYDROCHLORIDE 100 MG: 200 TABLET ORAL at 09:49

## 2025-03-10 RX ADMIN — DEXTROSE MONOHYDRATE 250 ML: 10 INJECTION, SOLUTION INTRAVENOUS at 20:05

## 2025-03-10 ASSESSMENT — PAIN SCALES - GENERAL
PAINLEVEL_OUTOF10: 5
PAINLEVEL_OUTOF10: 0
PAINLEVEL_OUTOF10: 0

## 2025-03-10 ASSESSMENT — PAIN DESCRIPTION - DESCRIPTORS: DESCRIPTORS: ACHING

## 2025-03-10 ASSESSMENT — PAIN DESCRIPTION - LOCATION: LOCATION: HEAD

## 2025-03-10 ASSESSMENT — PAIN SCALES - WONG BAKER: WONGBAKER_NUMERICALRESPONSE: NO HURT

## 2025-03-10 NOTE — PROGRESS NOTES
[facility]      Progress Note    Patient:  Leny Stone  YOB: 1950  Date of Service: 3/10/2025  MRN: [id number]   Acct: [id number]   Primary Care Physician: Regina Curtis MD  Advance Directive: Full Code  Admit Date: 3/6/2025       Hospital Day: 4    Portions of this note have been copied forward, however, updated to reflect the most current clinical status of this patient.     CHIEF COMPLAINT altered mental status    SUBJECTIVE: \"Hungry\" supposed to have Tonia today  No family present on rounds    CUMULATIVE HOSPITAL COURSE:   Patient is 74-year-old past medical history of lupus, hypertension, CKD stage III, CHF, hypothyroidism, anxiety, type 2 diabetes and history of A-fib on Coumadin.  Patient was brought in by family member after her son-in-law found patient with altered mental status.  Code stroke was called in the ED.  Family also reported patient is not always compliant with her medications.  She does not always wear her CPAP either.  She was found to have be lethargic and only give one-word answers.  ER workup CT head no acute intracranial hemorrhage or findings, CTA of the head neck no acute process CT brain perfusion no acute infarct or ischemia CT of the chest no acute findings abdomen and pelvis negative she does have a inferior vena cava [place] filter, bilateral lower lobe mild segmental atelectasis lab white count 7.2 hemoglobin 9.9 hematocrit 37.2 platelets 88 glucose 68 troponin 29 lactic 1.8 BUN 19 creatinine 1.6 GFR 33 patient admitted to the hospitalist service neurology was consulted.  MRI of the brain and the EEG.  MRI negative for acute findings .  Echo completed and cardiology consulted due to severely dilated left atrium.  D-dimer was also felt elevated and VQ scan was done which was negative.  EEG nonspecific no clear seizure activity.  Neurology felt like this event was a TIA and recommended continuing full anticoagulation.  Patient was due to have a Tonia today  mass.  There is no acute ischemic infarct or acute intracranial hemorrhage.  There is moderate cerebral parenchymal volume loss.  There is no hydrocephalus.  Scattered foci of T2/FLAIR hyperintense signal are present in the subcortical and periventricular white matter, most commonly seen in chronic white matter microvascular ischemic changes.  The basilar cisterns are patent.  The posterior fossa structures are within normal limits.  There is a partially empty sella.  The paranasal sinuses and mastoid air cells are well aerated.  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 There has been prior left cataract surgery.  No calvarial abnormality is identified.       1. No acute intracranial findings. 2. Moderate cerebral atrophy. 3. Moderate chronic white matter microvascular ischemic changes. 4. Partially empty sella.   ______________________________________ Electronically signed by: KATE GREGORY M.D. Date:     03/07/2025 Time:    15:42     Echo (TTE) complete (PRN contrast/bubble/strain/3D)  Result Date: 3/6/2025    Left Ventricle: Normal left ventricular systolic function with a visually estimated EF of 60 - 65%. Left ventricle size is normal. Mildly increased wall thickness. Normal wall motion.   Right Ventricle: Right ventricle size is normal. Normal systolic function. TAPSE is 2.4 cm.   Aortic Valve: Trileaflet valve. No cusp thickening. No regurgitation. No stenosis.   Mitral Valve: Valve structure is normal. MIld posterior leaflet prolapse with probably moderate regurgitation.   Left Atrium: Left atrium is severely dilated.   Interatrial Septum: Agitated saline study was negative with and without provocation.   Right Atrium: Right atrium size is normal.   IVC/SVC: IVC diameter is less than or equal to 21 mm and decreases greater than 50% during inspiration; therefore the estimated right atrial pressure is normal (~3 mmHg). IVC size is normal.     CT CHEST W CONTRAST  Result Date: 3/6/2025  EXAM: CHEST CT WITH INTRAVENOUS

## 2025-03-10 NOTE — PLAN OF CARE
Problem: Chronic Conditions and Co-morbidities  Goal: Patient's chronic conditions and co-morbidity symptoms are monitored and maintained or improved  Outcome: Progressing     Problem: Discharge Planning  Goal: Discharge to home or other facility with appropriate resources  Outcome: Progressing     Problem: Skin/Tissue Integrity  Goal: Skin integrity remains intact  Description: 1.  Monitor for areas of redness and/or skin breakdown  2.  Assess vascular access sites hourly  3.  Every 4-6 hours minimum:  Change oxygen saturation probe site  4.  Every 4-6 hours:  If on nasal continuous positive airway pressure, respiratory therapy assess nares and determine need for appliance change or resting period  Outcome: Progressing     Problem: Safety - Adult  Goal: Free from fall injury  Outcome: Progressing     Problem: Confusion  Goal: Confusion, delirium, dementia, or psychosis is improved or at baseline  Description: INTERVENTIONS:  1. Assess for possible contributors to thought disturbance, including medications, impaired vision or hearing, underlying metabolic abnormalities, dehydration, psychiatric diagnoses, and notify attending LIP  2. [place] high risk fall precautions, as indicated  3. Provide frequent short contacts to provide reality reorientation, refocusing and direction  4. Decrease environmental stimuli, including noise as appropriate  5. Monitor and intervene to maintain adequate nutrition, hydration, elimination, sleep and activity  6. If unable to ensure safety without constant attention obtain sitter and review sitter guidelines with assigned personnel  7. Initiate Psychosocial CNS and Spiritual Care consult, as indicated  Outcome: Progressing     Problem: Safety - Medical Restraint  Goal: Remains free of injury from restraints (Restraint for Interference with Medical Device)  Description: INTERVENTIONS:  1. Determine that other, less restrictive measures have been tried or would not be effective  before applying the restraint  2. Evaluate the patient's condition at the time of restraint application  3. Inform patient/family regarding the reason for restraint  4. Q2H: Monitor safety, psychosocial status, comfort, nutrition and hydration  Outcome: Progressing     Problem: Pain  Goal: Verbalizes/displays adequate comfort level or baseline comfort level  Outcome: Progressing     Problem: Nutrition Deficit:  Goal: Optimize nutritional status  Outcome: Progressing

## 2025-03-10 NOTE — PROGRESS NOTES
Patient:   Leny Stone  MR#:    [id number]   Room:    0316/316-02   YOB: 1950  Date of Progress Note: 3/10/2025  Time of Note                           8:09 AM  Consulting Physician:   Fabiano Sheffield M.D.  Attending Physician:  Lee Minaya MD       CHIEF COMPLAINT: Altered mental status  Subjective:  This 74 y.o. female who presents with altered mental status through the ED on 3/6.  She was brought in by her family.  She was reportedly able to talk and speak some but was not answering questions appropriately.  Came in through the ED and a code stroke was called.  CT brain perfusion and CTA with CT of the brain were all unremarkable.  Patient is on Coumadin but her INR was subtherapeutic.  She has a history of recurrent DVTs and what sounds like possible lupus anticoagulant.  There is a family history of hypercoagulability.  Atrial fibrillation is listed as a medical problem as well.  History is obtained from family at bedside as well as from the chart.  Patient also has been describing chest pain which appears more musculoskeletal.  Blood pressure has been elevated since admission.  Patient reportedly noncompliant with medications in the past.  Also has CHF, CKD, diabetes and hypertension.  No complaints today  REVIEW OF SYSTEMS:  Constitutional: No fevers No chills  Neck:No stiffness  Respiratory: No shortness of breath  Cardiovascular: No irregularities no palpitations  Gastrointestinal: No abdominal pain    Genitourinary: No Dysuria  Neurological: No headache, no confusion      PHYSICAL EXAM:  /67   Pulse 65   Temp 98 °F (36.7 °C) (Temporal)   Resp 16   Ht 1.702 m (5' 7.01\")   Wt 108.9 kg (240 lb)   SpO2 96%   BMI 37.58 kg/m²     Constitutional: she appears well-developed and well-nourished.   Eyes - conjunctiva normal.  Pupils react to light  Ear, nose, throat -hearing intact to voice. No scars, masses, or lesions over external nose or ears, no atrophy of tongue  Neck-symmetric, no

## 2025-03-10 NOTE — PROGRESS NOTES
Physical Therapy  Name: Leny Stone  MRN:  [id number]  Date of service:  3/10/2025       03/10/25 1540   Restrictions/Precautions   Restrictions/Precautions Fall Risk;Contact Precautions   Activity Level Up with Assist   Required Braces or Orthoses? No   General   Family/Caregiver Present Yes   Referring Practitioner Lakshmi Ramirez APRN - CNP   Subjective   Subjective I need that pot   Oxygen Therapy   O2 Device None (Room air)   Bed Mobility   Supine to Sit Stand by assistance   Sit to Supine Contact guard assistance   Transfers   Sit to Stand Minimal Assistance   Stand to Sit Minimal Assistance   Bed to Chair Minimal assistance   Ambulation   Surface Level tile   Device Rolling Walker   Assistance Contact guard assistance;Minimal assistance   Quality of Gait small, shuffled steps   Gait Deviations Slow Emily;Decreased step length;Decreased step height   Distance small steps to BSC and then to HOB   Other Activities   Comment assisted to and from BSC   Short Term Goals   Time Frame for Short Term Goals 2 wks   Short Term Goal 1 supine to sit indep   Short Term Goal 2 sit to stand indep   Short Term Goal 3 amb. 200' with RW SBA   Short Term Goal 4 bed to chair SBA   Conditions Requiring Skilled Therapeutic Intervention   Body Structures, Functions, Activity Limitations Requiring Skilled Therapeutic Intervention Decreased functional mobility ;Decreased ADL status;Decreased ROM;Decreased safe awareness;Decreased strength;Decreased balance;Decreased cognition;Decreased coordination;Increased pain   Assessment Patient had c/o of lightheadedness with sitting on BSC.  Patient sat on BSC for about 10 minutes and became nauseated.  She was able to clean her self with some assist.   Treatment Diagnosis impaired gait and mobility   Barriers to Learning cognition   Treatment Initiated  EOB sitting, transfers, bed mobility   Activity Tolerance   Activity Tolerance Treatment limited secondary to medical complications;Patient

## 2025-03-10 NOTE — PROGRESS NOTES
Clinical Pharmacy Note    Warfarin consult follow-up    Recent Labs     03/10/25  0423   INR 1.49*     Recent Labs     03/08/25  2207 03/09/25  0146 03/10/25  0423   HGB 10.7* 10.7* 10.3*   HCT 33.8* 34.3* 32.7*   * 111* 149     Current warfarin drug-drug interactions:      Amiodarone-Monitor patients extra closely for evidence of increased anticoagulant effects if amiodarone is initiated/dose increased, or decreased effects if amiodarone is discontinued/dose decreased, though due to the long half-life of amiodarone, any interaction may persist for several days/weeks following any dose decrease or discontinuation.   Carafate-Specifically, sucralfate may decrease the absorption of Vitamin K Antagonists.   Crestor-HMG-CoA Reductase Inhibitors (Statins) may enhance the anticoagulant effect of Vitamin K Antagonists.   Lexapro- Closely monitor patients for clinical and laboratory evidence of bleeding if these agents are combined.  Synthroid-Monitor for increased anticoagulant effects of vitamin K antagonists if a thyroid product is initiated/dose increased, or decreased effects if a thyroid product is discontinued/dose decreased.      Lovenox 1mg/kg q 12 hours ordered     Date INR Warfarin Dose   03/06/25 1.39 5 mg ordered but not taken   03/07/25   1.19 5 mg     03/08/25   1.24 5 mg     03/09/25   1.30 6 mg    03/10/25  1.49  6 mg                      Notes:   Give Coumadin 6 mg po x 1 dose today.            Daily PT/INR until stable within therapeutic range.     Electronically signed by Shayna Man RPh, BCPS, 3/10/2025,11:07 AM

## 2025-03-10 NOTE — PROGRESS NOTES
Occupational Therapy     03/10/25 1200   Subjective   Subjective Pt on BSC upon arrival for therapy. Daughter reports her mom has had low BP this am.   Pain Assessment   Pain Assessment None - Denies Pain   Cognition   Overall Cognitive Status Exceptions   Cognition Comment Not talkative, unable to guage.   Orientation   Overall Orientation Status WFL   Bed Mobility Training   Bed Mobility Training Yes   Overall Level of Assistance Minimal assistance;Stand by assistance   Interventions Verbal cues;Tactile cues;Manual cues   Sit to Supine Minimal assistance;Stand by assistance   Scooting Minimal assistance;Stand by assistance   Transfer Training   Transfer Training Yes   Overall Level of Assistance Minimal assistance   Interventions Verbal cues;Tactile cues;Manual cues   Sit to Stand Minimal assistance   Stand to Sit Minimal assistance   Toilet Transfer Minimal assistance  (BSC)   Balance   Sitting Intact   Standing With support  (RW)   ADL   Feeding NPO   Grooming Minimal assistance;Setup;Verbal cueing   UE Bathing Minimal assistance   LE Bathing Moderate assistance   UE Dressing Minimal assistance   LE Dressing Moderate assistance   Putting On/Taking Off Footwear Moderate assistance   Toileting Moderate assistance;Maximum assistance   Toileting Skilled Clinical Factors for clean up and clothing management   Functional Mobility Minimal assistance   Additional Comments Lethargic this date.   Assessment   Assessment Tx focused on completing toileting task, functional mobility back to bed at RW level.   Activity Tolerance Patient limited by fatigue   Discharge Recommendations Home with assist PRN;Home with Home health OT   OT Equipment Recommendations   Equipment Needed Yes   Mobility Devices Walker   Walker Rolling   Occupational Therapy Plan   Times Per Week 3-5   Times Per Day Once a day   OT Plan of Care   Monday X     Electronically signed by BETTE Smart on 3/10/2025 at 12:59 PM

## 2025-03-10 NOTE — PLAN OF CARE
Problem: Chronic Conditions and Co-morbidities  Goal: Patient's chronic conditions and co-morbidity symptoms are monitored and maintained or improved  Outcome: Progressing     Problem: Discharge Planning  Goal: Discharge to home or other facility with appropriate resources  Outcome: Progressing     Problem: Skin/Tissue Integrity  Goal: Skin integrity remains intact  Description: 1.  Monitor for areas of redness and/or skin breakdown  2.  Assess vascular access sites hourly  3.  Every 4-6 hours minimum:  Change oxygen saturation probe site  4.  Every 4-6 hours:  If on nasal continuous positive airway pressure, respiratory therapy assess nares and determine need for appliance change or resting period  Outcome: Progressing     Problem: Safety - Adult  Goal: Free from fall injury  Outcome: Progressing     Problem: Confusion  Goal: Confusion, delirium, dementia, or psychosis is improved or at baseline  Description: INTERVENTIONS:  1. Assess for possible contributors to thought disturbance, including medications, impaired vision or hearing, underlying metabolic abnormalities, dehydration, psychiatric diagnoses, and notify attending LIP  2. [place] high risk fall precautions, as indicated  3. Provide frequent short contacts to provide reality reorientation, refocusing and direction  4. Decrease environmental stimuli, including noise as appropriate  5. Monitor and intervene to maintain adequate nutrition, hydration, elimination, sleep and activity  6. If unable to ensure safety without constant attention obtain sitter and review sitter guidelines with assigned personnel  7. Initiate Psychosocial CNS and Spiritual Care consult, as indicated  Outcome: Progressing     Problem: Safety - Medical Restraint  Goal: Remains free of injury from restraints (Restraint for Interference with Medical Device)  Description: INTERVENTIONS:  1. Determine that other, less restrictive measures have been tried or would not be effective

## 2025-03-11 ENCOUNTER — APPOINTMENT (OUTPATIENT)
Dept: GENERAL RADIOLOGY | Age: 75
DRG: 069 | End: 2025-03-11
Payer: MEDICARE

## 2025-03-11 ENCOUNTER — APPOINTMENT (OUTPATIENT)
Dept: NUCLEAR MEDICINE | Age: 75
DRG: 069 | End: 2025-03-11
Attending: STUDENT IN AN ORGANIZED HEALTH CARE EDUCATION/TRAINING PROGRAM
Payer: MEDICARE

## 2025-03-11 LAB
ACANTHOCYTES BLD QL SMEAR: ABNORMAL
ADV 40+41 DNA STL QL NAA+NON-PROBE: NOT DETECTED
ANION GAP SERPL CALCULATED.3IONS-SCNC: 20 MMOL/L (ref 8–16)
ANISOCYTOSIS BLD QL SMEAR: ABNORMAL
BASOPHILS # BLD: 0 K/UL (ref 0–0.2)
BASOPHILS NFR BLD: 0 % (ref 0–1)
BUN SERPL-MCNC: 46 MG/DL (ref 8–23)
BURR CELLS BLD QL SMEAR: ABNORMAL
C CAYETANENSIS DNA STL QL NAA+NON-PROBE: NOT DETECTED
C COLI+JEJ+UPSA DNA STL QL NAA+NON-PROBE: NOT DETECTED
CALCIUM SERPL-MCNC: 8 MG/DL (ref 8.8–10.2)
CHLORIDE SERPL-SCNC: 96 MMOL/L (ref 98–107)
CO2 SERPL-SCNC: 15 MMOL/L (ref 22–29)
CREAT SERPL-MCNC: 4 MG/DL (ref 0.5–0.9)
CRYPTOSP DNA STL QL NAA+NON-PROBE: NOT DETECTED
E HISTOLYT DNA STL QL NAA+NON-PROBE: NOT DETECTED
EAEC PAA PLAS AGGR+AATA ST NAA+NON-PRB: NOT DETECTED
EC STX1+STX2 GENES STL QL NAA+NON-PROBE: NOT DETECTED
EKG P AXIS: 48 DEGREES
EKG P-R INTERVAL: 142 MS
EKG Q-T INTERVAL: 428 MS
EKG QRS DURATION: 110 MS
EKG QTC CALCULATION (BAZETT): 463 MS
EKG T AXIS: 130 DEGREES
EOSINOPHIL # BLD: 0 K/UL (ref 0–0.6)
EOSINOPHIL NFR BLD: 0 % (ref 0–5)
EPEC EAE GENE STL QL NAA+NON-PROBE: NOT DETECTED
ERYTHROCYTE [DISTWIDTH] IN BLOOD BY AUTOMATED COUNT: 15.5 % (ref 11.5–14.5)
ETEC LTA+ST1A+ST1B TOX ST NAA+NON-PROBE: NOT DETECTED
G LAMBLIA DNA STL QL NAA+NON-PROBE: NOT DETECTED
GI PATH DNA+RNA PNL STL NAA+NON-PROBE: NOT DETECTED
GLUCOSE BLD-MCNC: 144 MG/DL (ref 70–99)
GLUCOSE BLD-MCNC: 26 MG/DL (ref 70–99)
GLUCOSE BLD-MCNC: 30 MG/DL (ref 70–99)
GLUCOSE BLD-MCNC: 70 MG/DL (ref 70–99)
GLUCOSE BLD-MCNC: 88 MG/DL (ref 70–99)
GLUCOSE BLD-MCNC: 95 MG/DL (ref 70–99)
GLUCOSE SERPL-MCNC: 135 MG/DL (ref 70–99)
HCT VFR BLD AUTO: 27 % (ref 37–47)
HGB BLD-MCNC: 8.4 G/DL (ref 12–16)
HYPOCHROMIA BLD QL SMEAR: ABNORMAL
IMM GRANULOCYTES # BLD: 0.1 K/UL
INR PPP: 1.95 (ref 0.88–1.18)
LACTATE BLDV-SCNC: 3.8 MMOL/L (ref 0.5–1.9)
LYMPHOCYTES # BLD: 2.9 K/UL (ref 1.1–4.5)
LYMPHOCYTES NFR BLD: 15 % (ref 20–40)
MCH RBC QN AUTO: 29.5 PG (ref 27–31)
MCHC RBC AUTO-ENTMCNC: 31.1 G/DL (ref 33–37)
MCV RBC AUTO: 94.7 FL (ref 81–99)
MONOCYTES # BLD: 0.8 K/UL (ref 0–0.9)
MONOCYTES NFR BLD: 4 % (ref 0–10)
NEUTROPHILS # BLD: 15.5 K/UL (ref 1.5–7.5)
NEUTS BAND NFR BLD MANUAL: 16 % (ref 0–5)
NEUTS SEG NFR BLD: 65 % (ref 50–65)
NOROVIRUS GI+II RNA STL QL NAA+NON-PROBE: NOT DETECTED
P SHIGELLOIDES DNA STL QL NAA+NON-PROBE: NOT DETECTED
PERFORMED ON: ABNORMAL
PERFORMED ON: NORMAL
PLATELET # BLD AUTO: 193 K/UL (ref 130–400)
PLATELET SLIDE REVIEW: ADEQUATE
PMV BLD AUTO: 12.4 FL (ref 9.4–12.3)
POIKILOCYTOSIS BLD QL SMEAR: ABNORMAL
POTASSIUM SERPL-SCNC: 4.3 MMOL/L (ref 3.5–5)
PROCALCITONIN: 33 NG/ML (ref 0–0.09)
PROTHROMBIN TIME: 21.8 SEC (ref 12–14.6)
RBC # BLD AUTO: 2.85 M/UL (ref 4.2–5.4)
RVA RNA STL QL NAA+NON-PROBE: NOT DETECTED
S ENT+BONG DNA STL QL NAA+NON-PROBE: NOT DETECTED
SAPO I+II+IV+V RNA STL QL NAA+NON-PROBE: NOT DETECTED
SCHISTOCYTES BLD QL SMEAR: ABNORMAL
SHIGELLA SP+EIEC IPAH ST NAA+NON-PROBE: NOT DETECTED
SODIUM SERPL-SCNC: 131 MMOL/L (ref 136–145)
SPHEROCYTES BLD QL SMEAR: ABNORMAL
V CHOL+PARA+VUL DNA STL QL NAA+NON-PROBE: NOT DETECTED
V CHOLERAE DNA STL QL NAA+NON-PROBE: NOT DETECTED
WBC # BLD AUTO: 19.1 K/UL (ref 4.8–10.8)
Y ENTEROCOL DNA STL QL NAA+NON-PROBE: NOT DETECTED

## 2025-03-11 PROCEDURE — 84145 PROCALCITONIN (PCT): CPT

## 2025-03-11 PROCEDURE — 83605 ASSAY OF LACTIC ACID: CPT

## 2025-03-11 PROCEDURE — 94640 AIRWAY INHALATION TREATMENT: CPT

## 2025-03-11 PROCEDURE — 87040 BLOOD CULTURE FOR BACTERIA: CPT

## 2025-03-11 PROCEDURE — 93010 ELECTROCARDIOGRAM REPORT: CPT | Performed by: INTERNAL MEDICINE

## 2025-03-11 PROCEDURE — 2580000003 HC RX 258: Performed by: NURSE PRACTITIONER

## 2025-03-11 PROCEDURE — 6370000000 HC RX 637 (ALT 250 FOR IP): Performed by: NURSE PRACTITIONER

## 2025-03-11 PROCEDURE — 36415 COLL VENOUS BLD VENIPUNCTURE: CPT

## 2025-03-11 PROCEDURE — 6360000002 HC RX W HCPCS: Performed by: NURSE PRACTITIONER

## 2025-03-11 PROCEDURE — 71045 X-RAY EXAM CHEST 1 VIEW: CPT

## 2025-03-11 PROCEDURE — 6360000002 HC RX W HCPCS

## 2025-03-11 PROCEDURE — 87507 IADNA-DNA/RNA PROBE TQ 12-25: CPT

## 2025-03-11 PROCEDURE — 6370000000 HC RX 637 (ALT 250 FOR IP)

## 2025-03-11 PROCEDURE — 85610 PROTHROMBIN TIME: CPT

## 2025-03-11 PROCEDURE — 2500000003 HC RX 250 WO HCPCS

## 2025-03-11 PROCEDURE — 85025 COMPLETE CBC W/AUTO DIFF WBC: CPT

## 2025-03-11 PROCEDURE — 2580000003 HC RX 258

## 2025-03-11 PROCEDURE — 1200000000 HC SEMI PRIVATE

## 2025-03-11 PROCEDURE — 93005 ELECTROCARDIOGRAM TRACING: CPT | Performed by: INTERNAL MEDICINE

## 2025-03-11 PROCEDURE — 94760 N-INVAS EAR/PLS OXIMETRY 1: CPT

## 2025-03-11 PROCEDURE — 82962 GLUCOSE BLOOD TEST: CPT

## 2025-03-11 PROCEDURE — 2500000003 HC RX 250 WO HCPCS: Performed by: NURSE PRACTITIONER

## 2025-03-11 PROCEDURE — 99232 SBSQ HOSP IP/OBS MODERATE 35: CPT | Performed by: INTERNAL MEDICINE

## 2025-03-11 PROCEDURE — 80048 BASIC METABOLIC PNL TOTAL CA: CPT

## 2025-03-11 RX ORDER — ROSUVASTATIN CALCIUM 10 MG/1
5 TABLET, COATED ORAL DAILY
Status: DISCONTINUED | OUTPATIENT
Start: 2025-03-11 | End: 2025-03-18 | Stop reason: HOSPADM

## 2025-03-11 RX ORDER — METRONIDAZOLE 500 MG/100ML
500 INJECTION, SOLUTION INTRAVENOUS EVERY 8 HOURS
Status: DISCONTINUED | OUTPATIENT
Start: 2025-03-11 | End: 2025-03-15

## 2025-03-11 RX ORDER — LOPERAMIDE HYDROCHLORIDE 2 MG/1
4 CAPSULE ORAL 4 TIMES DAILY PRN
Status: DISCONTINUED | OUTPATIENT
Start: 2025-03-11 | End: 2025-03-18 | Stop reason: HOSPADM

## 2025-03-11 RX ORDER — SODIUM CHLORIDE, SODIUM LACTATE, POTASSIUM CHLORIDE, AND CALCIUM CHLORIDE .6; .31; .03; .02 G/100ML; G/100ML; G/100ML; G/100ML
500 INJECTION, SOLUTION INTRAVENOUS ONCE
Status: COMPLETED | OUTPATIENT
Start: 2025-03-11 | End: 2025-03-11

## 2025-03-11 RX ADMIN — DEXTROSE MONOHYDRATE: 100 INJECTION, SOLUTION INTRAVENOUS at 06:47

## 2025-03-11 RX ADMIN — METRONIDAZOLE 500 MG: 5 INJECTION, SOLUTION INTRAVENOUS at 18:20

## 2025-03-11 RX ADMIN — ASPIRIN 81 MG: 81 TABLET, CHEWABLE ORAL at 09:49

## 2025-03-11 RX ADMIN — ROSUVASTATIN 5 MG: 10 TABLET, FILM COATED ORAL at 09:55

## 2025-03-11 RX ADMIN — GABAPENTIN 300 MG: 300 CAPSULE ORAL at 21:32

## 2025-03-11 RX ADMIN — PANTOPRAZOLE SODIUM 40 MG: 40 TABLET, DELAYED RELEASE ORAL at 15:54

## 2025-03-11 RX ADMIN — CALCITRIOL CAPSULES 0.25 MCG 0.25 MCG: 0.25 CAPSULE ORAL at 09:49

## 2025-03-11 RX ADMIN — SUCRALFATE 1 G: 1 TABLET ORAL at 21:32

## 2025-03-11 RX ADMIN — LEVOTHYROXINE SODIUM 100 MCG: 100 TABLET ORAL at 06:15

## 2025-03-11 RX ADMIN — SODIUM CHLORIDE, PRESERVATIVE FREE 1000 MG: 5 INJECTION INTRAVENOUS at 15:42

## 2025-03-11 RX ADMIN — AZITHROMYCIN MONOHYDRATE 500 MG: 500 INJECTION, POWDER, LYOPHILIZED, FOR SOLUTION INTRAVENOUS at 21:32

## 2025-03-11 RX ADMIN — ENOXAPARIN SODIUM 105 MG: 150 INJECTION SUBCUTANEOUS at 21:32

## 2025-03-11 RX ADMIN — AMIODARONE HYDROCHLORIDE 100 MG: 200 TABLET ORAL at 09:49

## 2025-03-11 RX ADMIN — TIOTROPIUM BROMIDE AND OLODATEROL 2 PUFF: 3.124; 2.736 SPRAY, METERED RESPIRATORY (INHALATION) at 11:39

## 2025-03-11 RX ADMIN — PANTOPRAZOLE SODIUM 40 MG: 40 TABLET, DELAYED RELEASE ORAL at 06:15

## 2025-03-11 RX ADMIN — ESCITALOPRAM OXALATE 20 MG: 10 TABLET ORAL at 09:49

## 2025-03-11 RX ADMIN — GLUCAGON 1 MG: KIT at 07:40

## 2025-03-11 RX ADMIN — SODIUM BICARBONATE: 84 INJECTION, SOLUTION INTRAVENOUS at 11:44

## 2025-03-11 RX ADMIN — SODIUM BICARBONATE 650 MG: 650 TABLET ORAL at 09:49

## 2025-03-11 RX ADMIN — SUCRALFATE 1 G: 1 TABLET ORAL at 18:38

## 2025-03-11 RX ADMIN — SUCRALFATE 1 G: 1 TABLET ORAL at 11:44

## 2025-03-11 RX ADMIN — SUCRALFATE 1 G: 1 TABLET ORAL at 06:15

## 2025-03-11 RX ADMIN — METRONIDAZOLE 500 MG: 5 INJECTION, SOLUTION INTRAVENOUS at 23:36

## 2025-03-11 RX ADMIN — SODIUM CHLORIDE, PRESERVATIVE FREE 10 ML: 5 INJECTION INTRAVENOUS at 09:50

## 2025-03-11 RX ADMIN — SODIUM CHLORIDE, SODIUM LACTATE, POTASSIUM CHLORIDE, AND CALCIUM CHLORIDE 500 ML: .6; .31; .03; .02 INJECTION, SOLUTION INTRAVENOUS at 15:49

## 2025-03-11 RX ADMIN — METOCLOPRAMIDE 5 MG: 10 TABLET ORAL at 06:15

## 2025-03-11 RX ADMIN — WARFARIN SODIUM 6 MG: 5 TABLET ORAL at 18:38

## 2025-03-11 RX ADMIN — GABAPENTIN 300 MG: 300 CAPSULE ORAL at 09:49

## 2025-03-11 RX ADMIN — LOPERAMIDE HYDROCHLORIDE 4 MG: 2 CAPSULE ORAL at 15:54

## 2025-03-11 RX ADMIN — DICLOFENAC SODIUM 2 G: 10 GEL TOPICAL at 21:33

## 2025-03-11 RX ADMIN — ENOXAPARIN SODIUM 105 MG: 150 INJECTION SUBCUTANEOUS at 09:50

## 2025-03-11 RX ADMIN — METOCLOPRAMIDE 5 MG: 10 TABLET ORAL at 09:49

## 2025-03-11 ASSESSMENT — PAIN SCALES - GENERAL: PAINLEVEL_OUTOF10: 0

## 2025-03-11 NOTE — PROGRESS NOTES
Pharmacy Renal Adjustment    Leny Stone is a 74 y.o. female. Pharmacy has renally adjusted medications per protocol.    Recent Labs     03/10/25  0423 03/11/25  0802   BUN 31* 46*       Recent Labs     03/10/25  0423 03/11/25  0802   CREATININE 2.5* 4.0*       Estimated Creatinine Clearance: 15 mL/min (A) (based on SCr of 4 mg/dL (H)).    Height:   Ht Readings from Last 1 Encounters:   03/07/25 1.702 m (5' 7.01\")     Weight:  Wt Readings from Last 1 Encounters:   03/06/25 108.9 kg (240 lb)     Baseline SCr: 1.4    Plan: Adjust the following medications based on renal function:           Crestor to 5mg daily (listed home dose).    Electronically signed by Shabnam Hdz RPH on 3/11/2025 at 8:54 AM

## 2025-03-11 NOTE — PLAN OF CARE
Problem: Chronic Conditions and Co-morbidities  Goal: Patient's chronic conditions and co-morbidity symptoms are monitored and maintained or improved  3/11/2025 1724 by Ellie Martinez RN  Outcome: Progressing  3/11/2025 0326 by Temi Britt LPN  Outcome: Progressing  Flowsheets (Taken 3/10/2025 2253)  Care Plan - Patient's Chronic Conditions and Co-Morbidity Symptoms are Monitored and Maintained or Improved: Monitor and assess patient's chronic conditions and comorbid symptoms for stability, deterioration, or improvement     Problem: Discharge Planning  Goal: Discharge to home or other facility with appropriate resources  3/11/2025 1724 by Ellie Martinez RN  Outcome: Progressing  3/11/2025 0326 by Temi Britt LPN  Outcome: Progressing  Flowsheets (Taken 3/10/2025 2253)  Discharge to home or other facility with appropriate resources: Identify barriers to discharge with patient and caregiver     Problem: Skin/Tissue Integrity  Goal: Skin integrity remains intact  Description: 1.  Monitor for areas of redness and/or skin breakdown  2.  Assess vascular access sites hourly  3.  Every 4-6 hours minimum:  Change oxygen saturation probe site  4.  Every 4-6 hours:  If on nasal continuous positive airway pressure, respiratory therapy assess nares and determine need for appliance change or resting period  3/11/2025 1724 by Ellie Martinez RN  Outcome: Progressing  3/11/2025 0326 by Temi Britt LPN  Outcome: Progressing  Flowsheets (Taken 3/10/2025 2253)  Skin Integrity Remains Intact: Monitor for areas of redness and/or skin breakdown     Problem: Safety - Adult  Goal: Free from fall injury  3/11/2025 1724 by Ellie Martinez RN  Outcome: Progressing  3/11/2025 0326 by Temi Britt LPN  Outcome: Progressing  Flowsheets (Taken 3/11/2025 0325)  Free From Fall Injury: Instruct family/caregiver on patient safety     Problem: Confusion  Goal: Confusion, delirium, dementia, or

## 2025-03-11 NOTE — PROGRESS NOTES
Patient back from [facility].  Unable to run test due to patient having diarrhea/n/v.  Notified Carol Soni NP.  Family at bedside wishing to talk with MD/NP

## 2025-03-11 NOTE — PROGRESS NOTES
Clinical Pharmacy Note    Warfarin consult follow-up    Recent Labs     03/11/25  1004   INR 1.95*     Recent Labs     03/09/25  0146 03/10/25  0423 03/11/25  1004   HGB 10.7* 10.3* 8.4*   HCT 34.3* 32.7* 27.0*   * 149 193     Current warfarin drug-drug interactions:      Amiodarone-Monitor patients extra closely for evidence of increased anticoagulant effects if amiodarone is initiated/dose increased, or decreased effects if amiodarone is discontinued/dose decreased, though due to the long half-life of amiodarone, any interaction may persist for several days/weeks following any dose decrease or discontinuation.   Carafate-Specifically, sucralfate may decrease the absorption of Vitamin K Antagonists.   Crestor-HMG-CoA Reductase Inhibitors (Statins) may enhance the anticoagulant effect of Vitamin K Antagonists.   Lexapro- Closely monitor patients for clinical and laboratory evidence of bleeding if these agents are combined.  Synthroid-Monitor for increased anticoagulant effects of vitamin K antagonists if a thyroid product is initiated/dose increased, or decreased effects if a thyroid product is discontinued/dose decreased.      Lovenox 1mg/kg q 12 hours ordered      Date INR Warfarin Dose   03/06/25 1.39 5 mg ordered but not taken   03/07/25   1.19 5 mg     03/08/25   1.24 5 mg     03/09/25   1.30 6 mg    03/10/25  1.49  6 mg   03/11/25  1.95  6 mg                    Notes:   Coumadin 6mg po X1 tonight.                  Daily PT/INR until stable within therapeutic range.     Electronically signed by Shabnam Hdz RPH on 3/11/2025 at 1:02 PM

## 2025-03-11 NOTE — PLAN OF CARE
Problem: Chronic Conditions and Co-morbidities  Goal: Patient's chronic conditions and co-morbidity symptoms are monitored and maintained or improved  3/11/2025 0326 by Temi Britt LPN  Outcome: Progressing  Flowsheets (Taken 3/10/2025 2253)  Care Plan - Patient's Chronic Conditions and Co-Morbidity Symptoms are Monitored and Maintained or Improved: Monitor and assess patient's chronic conditions and comorbid symptoms for stability, deterioration, or improvement  3/10/2025 1713 by Mery Wellington RN  Outcome: Progressing     Problem: Discharge Planning  Goal: Discharge to home or other facility with appropriate resources  3/11/2025 0326 by Temi Britt LPN  Outcome: Progressing  Flowsheets (Taken 3/10/2025 2253)  Discharge to home or other facility with appropriate resources: Identify barriers to discharge with patient and caregiver  3/10/2025 1713 by Mery Wellington RN  Outcome: Progressing     Problem: Skin/Tissue Integrity  Goal: Skin integrity remains intact  Description: 1.  Monitor for areas of redness and/or skin breakdown  2.  Assess vascular access sites hourly  3.  Every 4-6 hours minimum:  Change oxygen saturation probe site  4.  Every 4-6 hours:  If on nasal continuous positive airway pressure, respiratory therapy assess nares and determine need for appliance change or resting period  3/11/2025 0326 by Temi Britt LPN  Outcome: Progressing  Flowsheets (Taken 3/10/2025 2253)  Skin Integrity Remains Intact: Monitor for areas of redness and/or skin breakdown  3/10/2025 1713 by Mery Wellington RN  Outcome: Progressing     Problem: Safety - Adult  Goal: Free from fall injury  3/11/2025 0326 by Temi Britt LPN  Outcome: Progressing  Flowsheets (Taken 3/11/2025 0325)  Free From Fall Injury: Instruct family/caregiver on patient safety  3/10/2025 1713 by Mery Wellington RN  Outcome: Progressing     Problem: Confusion  Goal: Confusion, delirium, dementia, or psychosis is improved

## 2025-03-11 NOTE — PROGRESS NOTES
Cardiology Daily Note Tyrone Ayon MD      Patient:  Leny Stone  [id number]    Patient Active Problem List    Diagnosis Date Noted    Fibromyalgia 03/14/2014    Paroxysmal atrial fibrillation ([facility]) 08/29/2022    Paroxysmal SVT (supraventricular tachycardia) 08/29/2022    SSS (sick sinus syndrome) (Former[facility]) 08/29/2022    Acute kidney injury superimposed on chronic kidney disease 08/17/2022    Chronic obstructive lung disease ([facility]) 05/24/2022    Chronic renal disease, stage III ([facility]) [146526] 04/24/2022    Altered mental status 03/06/2025    Anxiety     Neuropathy     Somnolence, daytime 03/18/2024    Snoring 03/18/2024    Witnessed apneic spells 03/18/2024    Difficulty using biphasic positive airway pressure (BiPAP) machine 03/18/2024    Atrial fibrillation with rapid ventricular response ([facility]) 02/08/2024    Chest pain, unspecified 02/08/2024    Hypertension 02/08/2024    Subtherapeutic international normalized ratio (INR) 02/08/2024    Unspecified severe protein-calorie malnutrition 01/18/2022    H/O systemic lupus erythematosus (SLE) ([facility]) 01/18/2022    Type II diabetes mellitus with nephropathy ([facility]) 01/18/2022    Stable angina 01/18/2022    Stage 3a chronic kidney disease ([facility]) 01/18/2022    Chronic deep vein thrombosis (DVT) of proximal vein of lower extremity ([facility]) 10/12/2021    Nonrheumatic mitral valve regurgitation 08/16/2021    Skin ulcer of upper arm, with fat layer exposed ([facility]) 05/04/2021    Dog bite 04/29/2021    Cellulitis of right upper extremity 04/29/2021    Abscess of right arm 04/29/2021    Soft tissue infection 04/29/2021    Lower abdominal pain 04/07/2021    Chronic heartburn 04/07/2021    S/P gastric bypass 04/07/2021    Severe episode of recurrent major depressive disorder, without psychotic features ([facility]) 07/17/2020    Gastroenteritis 01/25/2020    Colic 01/25/2020    Abdominal pain 01/25/2020    Asthmatic bronchitis without complication 01/13/2020    Thrombocytopenia 12/25/2019

## 2025-03-11 NOTE — PROGRESS NOTES
[facility]      [facility] Note    Patient:  Leny Stone  YOB: 1950  Date of Service: 3/11/2025  MRN: [id number]   Acct: [id number]   Primary Care Physician: Regina Curtis MD  Advance Directive: Full Code  Admit Date: 3/6/2025       Hospital Day: 5    Portions of this note have been copied forward, however, updated to reflect the most current clinical status of this patient.     CHIEF COMPLAINT altered mental status    SUBJECTIVE: Having frequent stools  CUMULATIVE HOSPITAL COURSE:   Patient is 74-year-old past medical history of lupus, hypertension, CKD stage III, CHF, hypothyroidism, anxiety, type 2 diabetes and history of A-fib on Coumadin.  Patient was brought in by family member after her son-in-law found patient with altered mental status.  Code stroke was called in the ED.  Family also reported patient is not always compliant with her medications.  She does not always wear her CPAP either.  She was found to have be lethargic and only give one-word answers.  ER workup CT head no acute intracranial hemorrhage or findings, CTA of the head neck no acute process CT brain perfusion no acute infarct or ischemia CT of the chest no acute findings abdomen and pelvis negative she does have a inferior vena cava Tessa filter, bilateral lower lobe mild segmental atelectasis lab white count 7.2 hemoglobin 9.9 hematocrit 37.2 platelets 88 glucose 68 troponin 29 lactic 1.8 BUN 19 creatinine 1.6 GFR 33 patient admitted to the hospitalist service neurology was consulted.  MRI of the brain and the EEG.  MRI negative for acute findings .  Echo completed and cardiology consulted due to severely dilated left atrium.  D-dimer was also felt elevated and VQ scan was done which was negative.  EEG nonspecific no clear seizure activity.  Neurology felt like this event was a TIA and recommended continuing full anticoagulation.  Patient was due to have a Tonia today but cannot since she had a lung scan and

## 2025-03-11 NOTE — PROGRESS NOTES
03/11/25 1000   Subjective   Subjective Attempt patient in [facility] NUCLEAR MED.   Vitals   Temp 99.1 °F (37.3 °C)   Pulse 63   Heart Rate Source Monitor   Respirations 12   SpO2 94 %   BP (!) 98/58  (Simultaneous filing. User may not have seen previous data.)   MAP (Calculated) 71  (Simultaneous filing. User may not have seen previous data.)   BP Location Right upper arm   BP Method Manual   Patient Position Semi fowlers   PT Plan of Care   Tuesday D       Electronically signed by Fam Centeno PTA on 3/11/2025 at 10:33 AM

## 2025-03-11 NOTE — CONSULTS
Renal Consult Note    Thank you to requesting provider: Dr Minaya, for asking us to see Leny Stone    Reason for consultation:  MIREYA, CKD    Chief Complaint:  Change in mental status.     History of Presenting Illness      Patient is a 74-year-old -American woman with a past medical history of type 2 diabetes, CHF, DVT Lupus anticoagulant, obesity status post gastric bypass, COPD, hypertension, atrial fibrillation and chronic kidney disease stage 3b followed by Dr. Connor at the renal office.  Patient was admitted on March 6 with acute change to her mental status.  She was initially very hypertensive.  A CT angiogram of the head and neck was obtained and she was found there was no high-grade stenosis in her brain circulation.  Her serum creatinine was 1.6 on admission with GFR at 33.  Patient was initially cell to have a TIA/CVA.  She was seen and followed by neurology.  She was also seen by cardiology.  She has received diuretics.  And over the last day, patient was having diarrhea.  Her renal function has progressively worsened with serum creatinine up to 2.5 on March 10 and 2 4 on March 11.  Renal service is consulted to manage her MIREYA.  Nurse has noticed drop to her urine output.  Patient is a bit sleepy and was a poor historian and not much verbal.  Son-in-law was present in the room at bedside.    Past Medical/Surgical History      Active Ambulatory Problems     Diagnosis Date Noted    Gastroesophageal reflux disease 05/02/2014    History of gastric bypass 05/02/2014    Family history of colon cancer 05/02/2014    Fibromyalgia 03/14/2014    Encounter for current long-term use of anticoagulants 02/22/2018    Primary osteoarthritis of right knee 03/22/2018    Arthritis of knee 03/26/2018    Hypertensive disorder 09/22/2016    Hyperglycemia 03/26/2018    Complex sleep apnea syndrome 03/26/2018    Iron deficiency anemia 03/26/2018    Restrictive airway disease 03/27/2018    DVT, lower extremity, proximal,

## 2025-03-11 NOTE — PROGRESS NOTES
Pr arrived at [facility] for Tonia testing. Pt c/o nausea, started vomiting prior to test, not able to put pt on imaging table due to high risk of aspiration.   Pt was escorted back to floor per [facility] nurse. Notified pt's primary nurse regarding pt's condition, pt is not stable for Lexiscan testing today.

## 2025-03-11 NOTE — PROGRESS NOTES
BS recheck 30.  Glucagon injection given and DX 10% infusing at 125 ml/hr.  Patient is alert and talking

## 2025-03-12 LAB
ACANTHOCYTES BLD QL SMEAR: ABNORMAL
AMORPH SED URNS QL MICRO: ABNORMAL /HPF
ANION GAP SERPL CALCULATED.3IONS-SCNC: 14 MMOL/L (ref 8–16)
ANISOCYTOSIS BLD QL SMEAR: ABNORMAL
B PARAP IS1001 DNA NPH QL NAA+NON-PROBE: NOT DETECTED
B PERT.PT PRMT NPH QL NAA+NON-PROBE: NOT DETECTED
BACTERIA #/AREA URNS HPF: ABNORMAL /HPF
BASOPHILS # BLD: 0.2 K/UL (ref 0–0.2)
BASOPHILS NFR BLD: 1 % (ref 0–1)
BILIRUB UR STRIP.AUTO-MCNC: NEGATIVE MG/DL
BUN SERPL-MCNC: 58 MG/DL (ref 8–23)
BURR CELLS BLD QL SMEAR: ABNORMAL
C PNEUM DNA NPH QL NAA+NON-PROBE: NOT DETECTED
CALCIUM SERPL-MCNC: 7.6 MG/DL (ref 8.8–10.2)
CHLORIDE SERPL-SCNC: 91 MMOL/L (ref 98–107)
CK SERPL-CCNC: 103 U/L (ref 26–192)
CLARITY UR: ABNORMAL
CO2 SERPL-SCNC: 23 MMOL/L (ref 22–29)
COARSE GRAN CASTS #/AREA URNS LPF: ABNORMAL /LPF (ref 0–5)
COLOR UR: YELLOW
CREAT SERPL-MCNC: 5.6 MG/DL (ref 0.5–0.9)
CREAT UR-MCNC: 183 MG/DL (ref 28–217)
EOSINOPHIL # BLD: 0.22 K/UL (ref 0–0.6)
EOSINOPHIL NFR BLD: 1 % (ref 0–5)
EOSINOPHIL URNS QL MICRO: NORMAL
ERYTHROCYTE [DISTWIDTH] IN BLOOD BY AUTOMATED COUNT: 15.6 % (ref 11.5–14.5)
FINE GRAN CASTS #/AREA URNS HPF: ABNORMAL /LPF (ref 0–2)
FLUAV RNA NPH QL NAA+NON-PROBE: NOT DETECTED
FLUBV RNA NPH QL NAA+NON-PROBE: NOT DETECTED
GLUCOSE BLD-MCNC: 71 MG/DL (ref 70–99)
GLUCOSE BLD-MCNC: 77 MG/DL (ref 70–99)
GLUCOSE BLD-MCNC: 92 MG/DL (ref 70–99)
GLUCOSE BLD-MCNC: 94 MG/DL (ref 70–99)
GLUCOSE BLD-MCNC: 94 MG/DL (ref 70–99)
GLUCOSE SERPL-MCNC: 100 MG/DL (ref 70–99)
GLUCOSE UR STRIP.AUTO-MCNC: NEGATIVE MG/DL
HADV DNA NPH QL NAA+NON-PROBE: NOT DETECTED
HCOV 229E RNA NPH QL NAA+NON-PROBE: NOT DETECTED
HCOV HKU1 RNA NPH QL NAA+NON-PROBE: NOT DETECTED
HCOV NL63 RNA NPH QL NAA+NON-PROBE: NOT DETECTED
HCOV OC43 RNA NPH QL NAA+NON-PROBE: NOT DETECTED
HCT VFR BLD AUTO: 21.8 % (ref 37–47)
HGB BLD-MCNC: 7.2 G/DL (ref 12–16)
HGB UR STRIP.AUTO-MCNC: NEGATIVE MG/L
HMPV RNA NPH QL NAA+NON-PROBE: NOT DETECTED
HPIV1 RNA NPH QL NAA+NON-PROBE: NOT DETECTED
HPIV2 RNA NPH QL NAA+NON-PROBE: NOT DETECTED
HPIV3 RNA NPH QL NAA+NON-PROBE: NOT DETECTED
HPIV4 RNA NPH QL NAA+NON-PROBE: NOT DETECTED
HYALINE CASTS #/AREA URNS LPF: ABNORMAL /LPF (ref 0–5)
HYPOCHROMIA BLD QL SMEAR: ABNORMAL
IMM GRANULOCYTES # BLD: 0.4 K/UL
INR PPP: 2.17 (ref 0.88–1.18)
IRON SATN MFR SERPL: 10 % (ref 15–50)
IRON SERPL-MCNC: 16 UG/DL (ref 37–145)
KETONES UR STRIP.AUTO-MCNC: NEGATIVE MG/DL
LACTATE BLDV-SCNC: 1.8 MMOL/L (ref 0.5–1.9)
LEUKOCYTE ESTERASE UR QL STRIP.AUTO: NEGATIVE
LYMPHOCYTES # BLD: 4.1 K/UL (ref 1.1–4.5)
LYMPHOCYTES NFR BLD: 14 % (ref 20–40)
M PNEUMO DNA NPH QL NAA+NON-PROBE: NOT DETECTED
MCH RBC QN AUTO: 30 PG (ref 27–31)
MCHC RBC AUTO-ENTMCNC: 33 G/DL (ref 33–37)
MCV RBC AUTO: 90.8 FL (ref 81–99)
MONOCYTES # BLD: 2 K/UL (ref 0–0.9)
MONOCYTES NFR BLD: 9 % (ref 0–10)
MRSA DNA SPEC QL NAA+PROBE: NOT DETECTED
NEUTROPHILS # BLD: 15.2 K/UL (ref 1.5–7.5)
NEUTS BAND NFR BLD MANUAL: 18 % (ref 0–5)
NEUTS SEG NFR BLD: 52 % (ref 50–65)
NITRITE UR QL STRIP.AUTO: NEGATIVE
OVALOCYTES BLD QL SMEAR: ABNORMAL
PERFORMED ON: NORMAL
PH UR STRIP.AUTO: 5.5 [PH] (ref 5–8)
PLATELET # BLD AUTO: 218 K/UL (ref 130–400)
PLATELET SLIDE REVIEW: ADEQUATE
PMV BLD AUTO: 11.1 FL (ref 9.4–12.3)
POIKILOCYTOSIS BLD QL SMEAR: ABNORMAL
POTASSIUM SERPL-SCNC: 3.8 MMOL/L (ref 3.5–5)
PROT UR STRIP.AUTO-MCNC: 30 MG/DL
PROTHROMBIN TIME: 23.6 SEC (ref 12–14.6)
PTH-INTACT SERPL-MCNC: 215 PG/ML (ref 15–65)
RBC # BLD AUTO: 2.4 M/UL (ref 4.2–5.4)
RBC #/AREA URNS HPF: ABNORMAL /HPF (ref 0–2)
RSV RNA NPH QL NAA+NON-PROBE: NOT DETECTED
RV+EV RNA NPH QL NAA+NON-PROBE: NOT DETECTED
SARS-COV-2 RNA NPH QL NAA+NON-PROBE: NOT DETECTED
SCHISTOCYTES BLD QL SMEAR: ABNORMAL
SODIUM SERPL-SCNC: 128 MMOL/L (ref 136–145)
SODIUM UR-SCNC: <20 MMOL/L
SP GR UR STRIP.AUTO: 1.02 (ref 1–1.03)
SPHEROCYTES BLD QL SMEAR: ABNORMAL
SQUAMOUS #/AREA URNS HPF: ABNORMAL /HPF
TARGETS BLD QL SMEAR: ABNORMAL
TIBC SERPL-MCNC: 168 UG/DL (ref 250–400)
URATE SERPL-MCNC: 8.8 MG/DL (ref 2.4–5.7)
UROBILINOGEN UR STRIP.AUTO-MCNC: 0.2 E.U./DL
VARIANT LYMPHS NFR BLD: 5 % (ref 0–8)
WBC # BLD AUTO: 21.7 K/UL (ref 4.8–10.8)
WBC #/AREA URNS HPF: ABNORMAL /HPF (ref 0–5)

## 2025-03-12 PROCEDURE — 83970 ASSAY OF PARATHORMONE: CPT

## 2025-03-12 PROCEDURE — 97110 THERAPEUTIC EXERCISES: CPT

## 2025-03-12 PROCEDURE — 2580000003 HC RX 258: Performed by: NURSE PRACTITIONER

## 2025-03-12 PROCEDURE — 6370000000 HC RX 637 (ALT 250 FOR IP)

## 2025-03-12 PROCEDURE — 84300 ASSAY OF URINE SODIUM: CPT

## 2025-03-12 PROCEDURE — 6370000000 HC RX 637 (ALT 250 FOR IP): Performed by: INTERNAL MEDICINE

## 2025-03-12 PROCEDURE — C1751 CATH, INF, PER/CENT/MIDLINE: HCPCS

## 2025-03-12 PROCEDURE — 0202U NFCT DS 22 TRGT SARS-COV-2: CPT

## 2025-03-12 PROCEDURE — 6370000000 HC RX 637 (ALT 250 FOR IP): Performed by: STUDENT IN AN ORGANIZED HEALTH CARE EDUCATION/TRAINING PROGRAM

## 2025-03-12 PROCEDURE — 87205 SMEAR GRAM STAIN: CPT

## 2025-03-12 PROCEDURE — 36415 COLL VENOUS BLD VENIPUNCTURE: CPT

## 2025-03-12 PROCEDURE — 82962 GLUCOSE BLOOD TEST: CPT

## 2025-03-12 PROCEDURE — 97535 SELF CARE MNGMENT TRAINING: CPT

## 2025-03-12 PROCEDURE — 82570 ASSAY OF URINE CREATININE: CPT

## 2025-03-12 PROCEDURE — 76937 US GUIDE VASCULAR ACCESS: CPT

## 2025-03-12 PROCEDURE — 6370000000 HC RX 637 (ALT 250 FOR IP): Performed by: NURSE PRACTITIONER

## 2025-03-12 PROCEDURE — 82550 ASSAY OF CK (CPK): CPT

## 2025-03-12 PROCEDURE — 94760 N-INVAS EAR/PLS OXIMETRY 1: CPT

## 2025-03-12 PROCEDURE — 6360000002 HC RX W HCPCS: Performed by: NURSE PRACTITIONER

## 2025-03-12 PROCEDURE — 2580000003 HC RX 258: Performed by: INTERNAL MEDICINE

## 2025-03-12 PROCEDURE — 2500000003 HC RX 250 WO HCPCS: Performed by: INTERNAL MEDICINE

## 2025-03-12 PROCEDURE — 94640 AIRWAY INHALATION TREATMENT: CPT

## 2025-03-12 PROCEDURE — 97530 THERAPEUTIC ACTIVITIES: CPT

## 2025-03-12 PROCEDURE — 83540 ASSAY OF IRON: CPT

## 2025-03-12 PROCEDURE — 83550 IRON BINDING TEST: CPT

## 2025-03-12 PROCEDURE — 87641 MR-STAPH DNA AMP PROBE: CPT

## 2025-03-12 PROCEDURE — 84550 ASSAY OF BLOOD/URIC ACID: CPT

## 2025-03-12 PROCEDURE — 36410 VNPNXR 3YR/> PHY/QHP DX/THER: CPT

## 2025-03-12 PROCEDURE — 85610 PROTHROMBIN TIME: CPT

## 2025-03-12 PROCEDURE — 83605 ASSAY OF LACTIC ACID: CPT

## 2025-03-12 PROCEDURE — 1200000000 HC SEMI PRIVATE

## 2025-03-12 PROCEDURE — 2500000003 HC RX 250 WO HCPCS

## 2025-03-12 PROCEDURE — 85025 COMPLETE CBC W/AUTO DIFF WBC: CPT

## 2025-03-12 PROCEDURE — 80048 BASIC METABOLIC PNL TOTAL CA: CPT

## 2025-03-12 PROCEDURE — 81001 URINALYSIS AUTO W/SCOPE: CPT

## 2025-03-12 RX ORDER — MIDODRINE HYDROCHLORIDE 5 MG/1
5 TABLET ORAL
Status: DISCONTINUED | OUTPATIENT
Start: 2025-03-12 | End: 2025-03-18 | Stop reason: HOSPADM

## 2025-03-12 RX ORDER — 0.9 % SODIUM CHLORIDE 0.9 %
500 INTRAVENOUS SOLUTION INTRAVENOUS ONCE
Status: COMPLETED | OUTPATIENT
Start: 2025-03-12 | End: 2025-03-12

## 2025-03-12 RX ORDER — GABAPENTIN 300 MG/1
300 CAPSULE ORAL NIGHTLY
Status: DISCONTINUED | OUTPATIENT
Start: 2025-03-12 | End: 2025-03-13

## 2025-03-12 RX ORDER — CALCITRIOL 0.25 UG/1
0.5 CAPSULE, LIQUID FILLED ORAL DAILY
Status: DISCONTINUED | OUTPATIENT
Start: 2025-03-13 | End: 2025-03-18 | Stop reason: HOSPADM

## 2025-03-12 RX ADMIN — TIOTROPIUM BROMIDE AND OLODATEROL 2 PUFF: 3.124; 2.736 SPRAY, METERED RESPIRATORY (INHALATION) at 10:07

## 2025-03-12 RX ADMIN — CALCITRIOL CAPSULES 0.25 MCG 0.25 MCG: 0.25 CAPSULE ORAL at 08:06

## 2025-03-12 RX ADMIN — METRONIDAZOLE 500 MG: 5 INJECTION, SOLUTION INTRAVENOUS at 08:10

## 2025-03-12 RX ADMIN — SUCRALFATE 1 G: 1 TABLET ORAL at 11:31

## 2025-03-12 RX ADMIN — SUCRALFATE 1 G: 1 TABLET ORAL at 05:27

## 2025-03-12 RX ADMIN — SODIUM BICARBONATE: 84 INJECTION, SOLUTION INTRAVENOUS at 12:37

## 2025-03-12 RX ADMIN — WARFARIN SODIUM 6 MG: 5 TABLET ORAL at 17:16

## 2025-03-12 RX ADMIN — METRONIDAZOLE 500 MG: 5 INJECTION, SOLUTION INTRAVENOUS at 23:05

## 2025-03-12 RX ADMIN — MIDODRINE HYDROCHLORIDE 5 MG: 5 TABLET ORAL at 17:16

## 2025-03-12 RX ADMIN — DICLOFENAC SODIUM 2 G: 10 GEL TOPICAL at 20:35

## 2025-03-12 RX ADMIN — PANTOPRAZOLE SODIUM 40 MG: 40 TABLET, DELAYED RELEASE ORAL at 05:27

## 2025-03-12 RX ADMIN — SODIUM CHLORIDE 1000 MG: 9 INJECTION, SOLUTION INTRAVENOUS at 21:23

## 2025-03-12 RX ADMIN — SUCRALFATE 1 G: 1 TABLET ORAL at 20:34

## 2025-03-12 RX ADMIN — METRONIDAZOLE 500 MG: 5 INJECTION, SOLUTION INTRAVENOUS at 15:21

## 2025-03-12 RX ADMIN — ROSUVASTATIN 5 MG: 10 TABLET, FILM COATED ORAL at 08:06

## 2025-03-12 RX ADMIN — ESCITALOPRAM OXALATE 20 MG: 10 TABLET ORAL at 08:06

## 2025-03-12 RX ADMIN — ENOXAPARIN SODIUM 105 MG: 150 INJECTION SUBCUTANEOUS at 08:05

## 2025-03-12 RX ADMIN — CEFEPIME 2000 MG: 2 INJECTION, POWDER, FOR SOLUTION INTRAVENOUS at 12:29

## 2025-03-12 RX ADMIN — DICLOFENAC SODIUM 2 G: 10 GEL TOPICAL at 08:07

## 2025-03-12 RX ADMIN — LEVOTHYROXINE SODIUM 100 MCG: 100 TABLET ORAL at 05:27

## 2025-03-12 RX ADMIN — SUCRALFATE 1 G: 1 TABLET ORAL at 17:16

## 2025-03-12 RX ADMIN — SODIUM CHLORIDE 500 ML: 9 INJECTION, SOLUTION INTRAVENOUS at 10:26

## 2025-03-12 RX ADMIN — OXYCODONE 2.5 MG: 5 TABLET ORAL at 20:34

## 2025-03-12 RX ADMIN — PANTOPRAZOLE SODIUM 40 MG: 40 TABLET, DELAYED RELEASE ORAL at 15:22

## 2025-03-12 RX ADMIN — GABAPENTIN 300 MG: 300 CAPSULE ORAL at 20:34

## 2025-03-12 RX ADMIN — ASPIRIN 81 MG: 81 TABLET, CHEWABLE ORAL at 08:06

## 2025-03-12 RX ADMIN — VANCOMYCIN HYDROCHLORIDE 2500 MG: 10 INJECTION, POWDER, LYOPHILIZED, FOR SOLUTION INTRAVENOUS at 15:17

## 2025-03-12 RX ADMIN — MIDODRINE HYDROCHLORIDE 5 MG: 5 TABLET ORAL at 11:31

## 2025-03-12 RX ADMIN — SODIUM CHLORIDE, PRESERVATIVE FREE 10 ML: 5 INJECTION INTRAVENOUS at 20:35

## 2025-03-12 ASSESSMENT — PAIN DESCRIPTION - DESCRIPTORS
DESCRIPTORS: ACHING

## 2025-03-12 ASSESSMENT — PAIN DESCRIPTION - LOCATION
LOCATION: NECK;CHEST
LOCATION: CHEST
LOCATION: NECK

## 2025-03-12 ASSESSMENT — PAIN DESCRIPTION - PAIN TYPE: TYPE: ACUTE PAIN

## 2025-03-12 ASSESSMENT — PAIN - FUNCTIONAL ASSESSMENT: PAIN_FUNCTIONAL_ASSESSMENT: PREVENTS OR INTERFERES SOME ACTIVE ACTIVITIES AND ADLS

## 2025-03-12 ASSESSMENT — PAIN SCALES - GENERAL
PAINLEVEL_OUTOF10: 4
PAINLEVEL_OUTOF10: 6

## 2025-03-12 ASSESSMENT — PAIN DESCRIPTION - FREQUENCY: FREQUENCY: INTERMITTENT

## 2025-03-12 ASSESSMENT — PAIN DESCRIPTION - ORIENTATION
ORIENTATION: LEFT
ORIENTATION: LEFT

## 2025-03-12 ASSESSMENT — PAIN DESCRIPTION - ONSET: ONSET: ON-GOING

## 2025-03-12 NOTE — PROGRESS NOTES
Physician Progress Note      PATIENT:               AHMET HANNON  CSN #:                  [id number]  :                       1950  ADMIT DATE:       3/6/2025 12:33 PM  DISCH DATE:  RESPONDING  PROVIDER #:        Lee Minaya MD          QUERY TEXT:    Pt admitted with AMS.  Noted documentation \"may have had a TIA\" by Dr. Sheffield   in his pnotes on 3/10.  If possible, please document in progress notes and   discharge summary:    The medical record reflects the following:  Risk Factors: hx of CVA, afib, medical noncompliance, subtherapeutic INR  Clinical Indicators: presented with AMS, CT head-no acute findings, CT   brain-no acute infarct or ischemia, MRI brain-no acute intracranial findings,   EEG-nonspecific abnormality, no clear epileptiform activity, INR 1.29;  per   Neuro pnotes 3/10 \"....admitted with altered mental status.  The latter has   cleared up.\"  Treatment: Neuro consult, CT head/brain, MRI brain, EEG, lovenox bridge    Thank you,  Raffi RN, CCDS  [email or web address]  Options provided:  -- TIA confirmed present on admission  -- TIA ruled out  -- Defer to Dr. Sheffield, neurologist and consultant documentation's regarding   \"she may have had a TIA\"  -- AMS due to, Please specify condition  -- Other - I will add my own diagnosis  -- Disagree - Not applicable / Not valid  -- Disagree - Clinically unable to determine / Unknown  -- Refer to Clinical Documentation Reviewer    PROVIDER RESPONSE TEXT:    The diagnosis of TIA was confirmed as present on admission.    Query created by: Raffi Benton on 3/10/2025 9:48 AM      QUERY TEXT:    Patient admitted with AMS, noted to have atrial fibrillation and is maintained   on Coumadin/lovenox used as bridge in the hospital. If possible, please   document in progress notes and discharge summary if you are evaluating and/or   treating any of the following:?  ?  The medical record reflects the following:  Risk Factors: 74 year old female with hx of CVA, DVT, PE, htn,

## 2025-03-12 NOTE — PROGRESS NOTES
Occupational Therapy     03/12/25 1200   Subjective   Subjective Pt in bed with daughter and family member present. Pt agreeable to participate and states \"I need to use the bathroom\".   Pain Assessment   Pain Assessment None - Denies Pain   Vitals   SpO2 92 %   BP (!) 98/48   MAP (Calculated) 65   Cognition   Overall Cognitive Status Exceptions   Orientation   Overall Orientation Status WFL   Bed Mobility Training   Bed Mobility Training Yes   Overall Level of Assistance Minimal assistance;Partial/Moderate assistance   Interventions Verbal cues;Tactile cues;Manual cues   Supine to Sit Minimal assistance;Stand by assistance   Sit to Supine Minimal assistance;Partial/Moderate assistance   Scooting Minimal assistance;Partial/Moderate assistance  (in supine)   Transfer Training   Transfer Training Yes   Overall Level of Assistance Minimal assistance;Partial/Moderate assistance   Interventions Verbal cues;Tactile cues;Manual cues   Sit to Stand Minimal assistance   Stand to Sit Minimal assistance;Partial/Moderate assistance   Toilet Transfer Minimal assistance;Partial/Moderate assistance  (low BP requiring more assistance due to onset of weakness.)   Balance   Sitting Intact   Standing With support   ADL   Feeding NPO   Grooming Minimal assistance;Setup;Verbal cueing   UE Bathing Minimal assistance   LE Bathing Moderate assistance   UE Dressing Minimal assistance   LE Dressing Moderate assistance   Putting On/Taking Off Footwear Moderate assistance   Toileting Moderate assistance;Maximum assistance   Toileting Skilled Clinical Factors for clean up and clothing management   Functional Mobility Minimal assistance;Moderate assistance   Functional Mobility Skilled Clinical Factors due to Low BP   Assessment   Assessment Tx focused on sitting EOB to don socks with Mod-Max assist. Pt instructed on t/f to BSC taking a few steps and turning around to sit. Pt lost her balance somewhat, requiring assistance to BSC. Nursing called

## 2025-03-12 NOTE — PLAN OF CARE
Problem: Chronic Conditions and Co-morbidities  Goal: Patient's chronic conditions and co-morbidity symptoms are monitored and maintained or improved  3/12/2025 0227 by Temi Britt LPN  Outcome: Progressing  3/11/2025 1724 by Ellie Martinez, RN  Outcome: Progressing  Flowsheets (Taken 3/11/2025 0949)  Care Plan - Patient's Chronic Conditions and Co-Morbidity Symptoms are Monitored and Maintained or Improved: Monitor and assess patient's chronic conditions and comorbid symptoms for stability, deterioration, or improvement     Problem: Discharge Planning  Goal: Discharge to home or other facility with appropriate resources  3/12/2025 0227 by Temi Britt LPN  Outcome: Progressing  3/11/2025 1724 by Ellie Martinez RN  Outcome: Progressing  Flowsheets (Taken 3/11/2025 0949)  Discharge to home or other facility with appropriate resources: Identify barriers to discharge with patient and caregiver     Problem: Skin/Tissue Integrity  Goal: Skin integrity remains intact  Description: 1.  Monitor for areas of redness and/or skin breakdown  2.  Assess vascular access sites hourly  3.  Every 4-6 hours minimum:  Change oxygen saturation probe site  4.  Every 4-6 hours:  If on nasal continuous positive airway pressure, respiratory therapy assess nares and determine need for appliance change or resting period  3/12/2025 0227 by Temi Britt LPN  Outcome: Progressing  Flowsheets (Taken 3/12/2025 0226)  Skin Integrity Remains Intact: Monitor for areas of redness and/or skin breakdown  3/11/2025 1724 by Ellie Martinez RN  Outcome: Progressing  Flowsheets (Taken 3/11/2025 0949)  Skin Integrity Remains Intact: Monitor for areas of redness and/or skin breakdown     Problem: Safety - Adult  Goal: Free from fall injury  3/12/2025 0227 by Temi Britt LPN  Outcome: Progressing  Flowsheets (Taken 3/12/2025 0226)  Free From Fall Injury: Instruct family/caregiver on patient safety  3/11/2025

## 2025-03-12 NOTE — PROGRESS NOTES
Nutrition Assessment     Type and Reason for Visit: Reassess    Nutrition Recommendations/Plan:   Follow for decreasing nausea, diarrhea and advanced diet     Malnutrition Assessment:  Malnutrition Status: At risk for malnutrition    Nutrition Assessment:  Patient now NPO.  Aware of nausea and increased diarrhea occuring yesterday.  Pt had been receiving a dysphagia soft and bite sized diet and po intake has been good with intake ranging %.  Aware of decreasing renal function--BUN 46, Creat 4, GFR 11.  New weight not available.    Estimated Daily Nutrient Needs:  Energy (kcal):  8349-5486 kcals (15-18 kcals/kg) Weight Used for Energy Requirements: Current     Protein (g):  74-123g Weight Used for Protein Requirements: Ideal        Fluid (ml/day):  4074-0502 ml Method Used for Fluid Requirements: 1 ml/kcal    Nutrition Related Findings:   HX-Gastric bypas.  Nonpitting BLE edema Wound Type: None    Current Nutrition Therapies:    Diet NPO    Anthropometric Measures:  Height: 170.2 cm (5' 7.01\")  Current Body Wt: 108.9 kg (240 lb 1.3 oz)   BMI: 37.6        Nutrition Diagnosis:   Altered nutrition-related lab values related to renal dysfunction as evidenced by lab values    Nutrition Interventions:   Food and/or Nutrient Delivery: Continue NPO  Nutrition Education/Counseling: No recommendation at this time  Coordination of Nutrition Care: Continue to monitor while inpatient       Goals:  Goals: Meet at least 75% of estimated needs, Maintain adequate nutrition status  Type of Goal: Continue current goal       Nutrition Monitoring and Evaluation:   Behavioral-Environmental Outcomes: None Identified  Food/Nutrient Intake Outcomes: Progression of Nutrition, Diet Advancement/Tolerance, Food and Nutrient Intake  Physical Signs/Symptoms Outcomes: Biochemical Data, Diarrhea, Fluid Status or Edema, Weight, Skin, Nausea or Vomiting    Discharge Planning:    Too soon to determine     Patsy Mcdaniels MS, RD, LD  Contact:

## 2025-03-12 NOTE — PROGRESS NOTES
Renal Progress Note    Thank you to requesting provider: Dr Minaya, for asking us to see Leny Stone    Reason for consultation:  MIREYA, CKD    Chief Complaint:  Change in mental status.     History of Presenting Illness      Patient is a 74-year-old -American woman with a past medical history of type 2 diabetes, CHF, DVT Lupus anticoagulant, obesity status post gastric bypass, COPD, hypertension, atrial fibrillation and chronic kidney disease stage 3b followed by Dr. Connor at the renal office.  Patient was admitted on March 6 with acute change to her mental status.  She was initially very hypertensive.  A CT angiogram of the head and neck was obtained and she was found there was no high-grade stenosis in her brain circulation.  Her serum creatinine was 1.6 on admission with GFR at 33.  Patient was initially cell to have a TIA/CVA.  She was seen and followed by neurology.  She was also seen by cardiology.  She has received diuretics.  And over the last day, patient was having diarrhea.  Her renal function has progressively worsened with serum creatinine up to 2.5 on March 10 and 2 4 on March 11.  Renal service is consulted to manage her MIREYA.  Nurse has noticed drop tin her urine output.  Patient is a bit sleepy and was a poor historian and not much verbal.   Patient is more awake today. She remained oligoanuric. Daughter at bedside is concerned with low BPs and diarrhea. She is on IV fluids. She is NPO. She is more awake and verbal now.     Past Medical/Surgical History      Active Ambulatory Problems     Diagnosis Date Noted    Gastroesophageal reflux disease 05/02/2014    History of gastric bypass 05/02/2014    Family history of colon cancer 05/02/2014    Fibromyalgia 03/14/2014    Encounter for current long-term use of anticoagulants 02/22/2018    Primary osteoarthritis of right knee 03/22/2018    Arthritis of knee 03/26/2018    Hypertensive disorder 09/22/2016    Hyperglycemia 03/26/2018    Complex sleep  syndrome     Coat's syndrome     COPD (chronic obstructive pulmonary disease) (HCC)     Depression     Diabetes mellitus type 2 in nonobese (HCC)     Diabetic nephropathy (HCC)     Disequilibrium     Dizziness     DVT (deep venous thrombosis) (HCC)     Exudative retinopathy     Fibromyositis     Gastric ulcer     GERD (gastroesophageal reflux disease)     Headache 01/2023    Hx of blood clots     Hx of lupus anticoagulant disorder     Hyperthyroidism     Intermittent claudication     Intestinal obstruction (HCC)     Iron deficiency     Irritable bowel syndrome     Left-sided weakness     Liver disease     Low vitamin D level     Lupus     Menopause     Neuropathy     Obesity     MER (obstructive sleep apnea)     Osteoarthritis     Osteoporosis     Other iron deficiency anemias     Peripheral vascular disease     Pernicious anemia     PONV (postoperative nausea and vomiting)     PUPP (pruritic urticarial papules and plaques of pregnancy)     Right leg numbness     Right sided sciatica     Sarcoidosis     Sciatica     Secondary hyperparathyroidism     Shingles     Shortness of breath     Sleep apnea     Stomach ulcer     Syncope     Urinary incontinence     Visual loss, one eye          Review of Systems     History obtained from chart review and the patient  General ROS: No fever or chills  Respiratory ROS: No cough, shortness of breath, wheezing  Cardiovascular ROS: no chest pain or dyspnea on exertion  Gastrointestinal ROS: No abdominal pain or melena  Genito-Urinary ROS: No dysuria or hematuria  Musculoskeletal ROS: No joint pain or swelling       Medications        Current Facility-Administered Medications:     ceFEPIme (MAXIPIME) 2,000 mg in sodium chloride 0.9 % 100 mL IVPB ([id number]), 2,000 mg, IntraVENous, Once, Carol Soni, APRN - CNP    cefepime (MAXIPIME) 1,000 mg in sodium chloride 0.9 % 50 mL IVPB ([id number]), 1,000 mg, IntraVENous, Q12H, Carol Soni, APRN - CNP    sodium chloride 0.9 % bolus 500

## 2025-03-12 NOTE — PROGRESS NOTES
[facility]   Pharmacy Pharmacokinetic Monitoring Service - Vancomycin     Leny Stone is a 74 y.o. female starting on vancomycin therapy for nosocomial pneumonia. Pharmacy consulted by CHANELLE Martins for monitoring and adjustment.    Target Concentration: Trough 15-20 mg/L, switch to -600 once renal function stable    Additional Antimicrobials: Cefepime    Pertinent Laboratory Values:   Wt Readings from Last 1 Encounters:   03/06/25 108.9 kg (240 lb)     Temp Readings from Last 1 Encounters:   03/12/25 98.1 °F (36.7 °C) (Temporal)     Estimated Creatinine Clearance: 11 mL/min (A) (based on SCr of 5.6 mg/dL (H)).  Recent Labs     03/11/25  0802 03/11/25  1004 03/12/25  0825   CREATININE 4.0*  --  5.6*   BUN 46*  --  58*   WBC  --  19.1* 21.7*     Procalcitonin: NA    Pertinent Cultures:  Culture Date Source Results   03/11/25 Blood Sent   03/11/25 MRSA No result   03/11/25  Resp PCR panel No result   03/12/25 C diff    MRSA Nasal Swab: was ordered by provider, awaiting results.    Plan:  Concentration-guided dosing due to renal impairment/insufficiency  Start vancomycin 2500 mg IV x 1 dose  Renal labs as indicated   Vancomycin concentration ordered for 3/13 @ 1500  Pharmacy will continue to monitor patient and adjust therapy as indicated    Thank you for the consult,  Shayna Man RP, BCPS  3/12/2025 1:35 PM

## 2025-03-12 NOTE — CONSULTS
[facility] Vascular Access Team:  PowerGlide Midline/Extended Dwell IV Catheter  Insertion Procedure Note      Patient: Leny Stone  YOB: 1950   MRN: [id number]  Room: [address] [place] [address]     Attending Physician - Karol Howard MD  Ordering Physician - Carol Soni APRN-CNP    Diagnosis:   Altered mental status [R41.82]  Altered mental status, unspecified altered mental status type [R41.82]       Procedure(s):   Insertion of a 20 gauge 10 cm Single Lumen PowerGlide Catheter    Indication(s):  Poor Venous Access and Frequent Lab Draws (multiple lab draw attempts made unsuccessfully. History of poor venous access requiring ultrasound guided IV and Midline placement)    Date of Procedure: 3/12/2025     Performed by: Surendra Arguello RN    Complications: None      Findings:   1. Successful insertion of PowerGlide Catheter.  2. PowerGlide is ready to be used. Please change tubing prior to using the new line. Make sure to label, date and use alcohol caps on new tubing and alcohol caps on unused ports.   3. Patient may be changed to a unit draw for lab work.        Detailed Description of Procedure:   The Left Brachial vein was visualized using the ultrasound and deemed a suitable vessel. The area was prepped with Chlorhexidine and draped in sterile fashion using partial barrier draping. The vein was accessed using US guidance. The 20 gauge 10 cm Single Lumen PowerGlide catheter was advanced into the vein using ANTT. Approximately 0 cm exposed. All lumens had brisk blood return and was flushed with 10 cc of NS. The catheter was secured using a securement device. A 3M CHG Tegaderm was placed over the securement device and insertion site. Dressing was dated and initialed with external measurement marked. Patient did tolerate procedure well.    Electronically signed by Surendra Arguello RN on 3/12/2025 at 3:04 PM

## 2025-03-12 NOTE — PROGRESS NOTES
Clinical Pharmacy Note    Warfarin consult follow-up    Recent Labs     03/12/25  0825   INR 2.17*     Recent Labs     03/10/25  0423 03/11/25  1004 03/12/25  0825   HGB 10.3* 8.4* 7.2*   HCT 32.7* 27.0* 21.8*    193 218     Current warfarin drug-drug interactions:      Amiodarone-Monitor patients extra closely for evidence of increased anticoagulant effects if amiodarone is initiated/dose increased, or decreased effects if amiodarone is discontinued/dose decreased, though due to the long half-life of amiodarone, any interaction may persist for several days/weeks following any dose decrease or discontinuation.   Carafate-Specifically, sucralfate may decrease the absorption of Vitamin K Antagonists.   Crestor-HMG-CoA Reductase Inhibitors (Statins) may enhance the anticoagulant effect of Vitamin K Antagonists.   Lexapro- Closely monitor patients for clinical and laboratory evidence of bleeding if these agents are combined.  Synthroid-Monitor for increased anticoagulant effects of vitamin K antagonists if a thyroid product is initiated/dose increased, or decreased effects if a thyroid product is discontinued/dose decreased.     New Drug Interactions:   Cefepime- Monitor for elevated INR and bleeding if a vitamin K antagonist is used in combination with cephalosporins.   Flagyl-Consider alternatives to concomitant therapy with metronidazole and vitamin K antagonists. If concomitant therapy cannot be avoided, consider reducing the dose of the vitamin K antagonist and monitor for increased INR/bleeding if metronidazole is initiated/dose increased.     Discontinued medications: Lovenox for bridge.(OK\Carol)    Date INR Warfarin Dose   03/06/25 1.39 5 mg ordered but not taken   03/07/25   1.19 5 mg     03/08/25   1.24 5 mg     03/09/25   1.30 6 mg    03/10/25  1.49  6 mg   03/11/25  1.95  6 mg     03/12/25 2.17  6 mg        Notes:   Coumadin 6mg po X1 tonight.                  Daily PT/INR until stable within

## 2025-03-12 NOTE — PROGRESS NOTES
[facility]      Progress Note    Patient:  Leny Stone  YOB: 1950  Date of Service: 3/12/2025  MRN: [id number]   Acct: [id number]   Primary Care Physician: Regina Curtis MD  Advance Directive: Full Code  Admit Date: 3/6/2025       Hospital Day: 6    Portions of this note have been copied forward, however, updated to reflect the most current clinical status of this patient.     CHIEF COMPLAINT altered mental status    SUBJECTIVE: Having frequent stools  CUMULATIVE HOSPITAL COURSE:   Patient is 74-year-old past medical history of lupus, hypertension, CKD stage III, CHF, hypothyroidism, anxiety, type 2 diabetes and history of A-fib on Coumadin.  Patient was brought in by family member after her son-in-law found patient with altered mental status.  Code stroke was called in the ED.  Family also reported patient is not always compliant with her medications.  She does not always wear her CPAP either.  She was found to have be lethargic and only give one-word answers.  ER workup CT head no acute intracranial hemorrhage or findings, CTA of the head neck no acute process CT brain perfusion no acute infarct or ischemia CT of the chest no acute findings abdomen and pelvis negative she does have a inferior vena cava Tessa filter, bilateral lower lobe mild segmental atelectasis lab white count 7.2 hemoglobin 9.9 hematocrit 37.2 platelets 88 glucose 68 troponin 29 lactic 1.8 BUN 19 creatinine 1.6 GFR 33 patient admitted to the hospitalist service neurology was consulted.  MRI of the brain and the EEG.  MRI negative for acute findings .  Echo completed and cardiology consulted due to severely dilated left atrium.  D-dimer was also felt elevated and VQ scan was done which was negative.  EEG nonspecific no clear seizure activity.  Neurology felt like this event was a TIA and recommended continuing full anticoagulation.  Patient was due to have a Tonia today but cannot since she had a lung scan and  100*     No results for input(s): \"AST\", \"ALT\", \"BILITOT\", \"ALKPHOS\" in the last 72 hours.    Invalid input(s): \"ALB\"  Troponin T: No results for input(s): \"TROPONINI\" in the last 72 hours.  Pro-BNP: No results for input(s): \"BNP\" in the last 72 hours.  INR:   Recent Labs     03/10/25  0423 03/11/25  1004 03/12/25  0825   INR 1.49* 1.95* 2.17*     UA:  Recent Labs     03/12/25  1000   COLORU YELLOW   PHUR 5.5   WBCUA 0-1   RBCUA 0-1   BACTERIA None Seen   CLARITYU SL CLOUDY*   LEUKOCYTESUR Negative   UROBILINOGEN 0.2   BILIRUBINUR Negative   BLOODU Negative   GLUCOSEU NEGATIVE   AMORPHOUS 1+*     A1C: No results for input(s): \"LABA1C\" in the last 72 hours.  ABG:No results for input(s): \"PHART\", \"JVJ3WVS\", \"PO2ART\", \"EJI3CTL\", \"BEART\", \"HGBAE\", \"V1KCBTVT\", \"CARBOXHGBART\" in the last 72 hours.    RAD:   MRI BRAIN WO CONTRAST  Result Date: 3/7/2025  EXAM:  BRAIN MRI WITHOUT CONTRAST  HISTORY:  Altered mental status.  TECHNIQUE:  Multiplanar and multisequence MRI of the brain without the administration of intravenous contrast.  COMPARISON:  Same day brain CT perfusion. Brain MRI dated 10/18/2024.  FINDINGS:  There is no intracranial mass.  There is no acute ischemic infarct or acute intracranial hemorrhage.  There is moderate cerebral parenchymal volume loss.  There is no hydrocephalus.  Scattered foci of T2/FLAIR hyperintense signal are present in the subcortical and periventricular white matter, most commonly seen in chronic white matter microvascular ischemic changes.  The basilar cisterns are patent.  The posterior fossa structures are within normal limits.  There is a partially empty sella.  The paranasal sinuses and mastoid air cells are well aerated.  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 There has been prior left cataract surgery.  No calvarial abnormality is identified.       1. No acute intracranial findings. 2. Moderate cerebral atrophy. 3. Moderate chronic white matter microvascular ischemic changes. 4. Partially

## 2025-03-12 NOTE — PROGRESS NOTES
03/12/25 1600   Subjective   Subjective I have been dizzy.   Pain No C/O pain.   Cognition   Overall Cognitive Status WFL   Orientation   Overall Orientation Status WFL   Vitals   O2 Device None (Room air)   Bed Mobility Training   Bed Mobility Training Yes   Supine to Sit Minimal assistance   Sit to Supine Minimal assistance   Balance   Sitting Intact  (Sat EOB X 10-15 minutes SBA/CGA.   Patient returned to supine due to dizziness.)   PT Exercises   A/AROM Exercises BL LE EX sitting EOB  X 20 reps.   Patient Education   Education Given To Patient;Family  (Daughter present.)   Education Provided Role of Therapy;Plan of Care;Home Exercise Program;Fall Prevention Strategies   Education Provided Comments use of call light, staff A   Education Method Demonstration;Verbal   Education Outcome Verbalized understanding;Demonstrated understanding;Continued education needed   Other Specialty Interventions   Other Treatments/Modalities BTB call light all needs in reach.  Bed alarm set.     Daughter present.   PT Plan of Care   Wednesday X       Electronically signed by Fam Centeno PTA on 3/12/2025 at 4:22 PM

## 2025-03-12 NOTE — PLAN OF CARE
Problem: Chronic Conditions and Co-morbidities  Goal: Patient's chronic conditions and co-morbidity symptoms are monitored and maintained or improved  3/12/2025 0228 by Temi Britt LPN  Outcome: Progressing  3/12/2025 0227 by Temi Britt LPN  Outcome: Progressing  3/11/2025 1724 by Ellie Martinez RN  Outcome: Progressing  Flowsheets (Taken 3/11/2025 0949)  Care Plan - Patient's Chronic Conditions and Co-Morbidity Symptoms are Monitored and Maintained or Improved: Monitor and assess patient's chronic conditions and comorbid symptoms for stability, deterioration, or improvement     Problem: Discharge Planning  Goal: Discharge to home or other facility with appropriate resources  3/12/2025 0228 by Temi Britt LPN  Outcome: Progressing  3/12/2025 0227 by Temi Britt LPN  Outcome: Progressing  3/11/2025 1724 by Ellie Martinez RN  Outcome: Progressing  Flowsheets (Taken 3/11/2025 0949)  Discharge to home or other facility with appropriate resources: Identify barriers to discharge with patient and caregiver     Problem: Skin/Tissue Integrity  Goal: Skin integrity remains intact  Description: 1.  Monitor for areas of redness and/or skin breakdown  2.  Assess vascular access sites hourly  3.  Every 4-6 hours minimum:  Change oxygen saturation probe site  4.  Every 4-6 hours:  If on nasal continuous positive airway pressure, respiratory therapy assess nares and determine need for appliance change or resting period  3/12/2025 0228 by Temi Britt LPN  Outcome: Progressing  3/12/2025 0227 by Temi Britt LPN  Outcome: Progressing  Flowsheets (Taken 3/12/2025 0226)  Skin Integrity Remains Intact: Monitor for areas of redness and/or skin breakdown  3/11/2025 1724 by Ellie Martinez RN  Outcome: Progressing  Flowsheets (Taken 3/11/2025 0949)  Skin Integrity Remains Intact: Monitor for areas of redness and/or skin breakdown     Problem: Safety - Adult  Goal:

## 2025-03-12 NOTE — PROGRESS NOTES
Pharmacy Renal Adjustment    Leny Stone is a 74 y.o. female. Pharmacy has renally adjusted medications per protocol.    Recent Labs     03/11/25  0802 03/12/25  0825   BUN 46* 58*       Recent Labs     03/11/25  0802 03/12/25  0825   CREATININE 4.0* 5.6*       Estimated Creatinine Clearance: 11 mL/min (A) (based on SCr of 5.6 mg/dL (H)).    Height:   Ht Readings from Last 1 Encounters:   03/07/25 1.702 m (5' 7.01\")     Weight:  Wt Readings from Last 1 Encounters:   03/06/25 108.9 kg (240 lb)       Plan: Adjust the following medications based on renal function:           Gabapentin from 300mg po twice a day top 300mg nightly due to decline in renal function.    Electronically signed by Shabnam Hdz RPH on 3/12/2025 at 1:38 PM

## 2025-03-12 NOTE — PROGRESS NOTES
Pharmacy Renal Adjustment    Leny Stone is a 74 y.o. female. Pharmacy has renally adjusted medications per protocol.    Recent Labs     03/11/25  0802 03/12/25  0825   BUN 46* 58*       Recent Labs     03/11/25  0802 03/12/25  0825   CREATININE 4.0* 5.6*       Estimated Creatinine Clearance: 11 mL/min (A) (based on SCr of 5.6 mg/dL (H)).    Height:   Ht Readings from Last 1 Encounters:   03/07/25 1.702 m (5' 7.01\")     Weight:  Wt Readings from Last 1 Encounters:   03/06/25 108.9 kg (240 lb)         Plan: Adjust the following medications based on renal function:           Cefepime to 2000 mg IV once over 30 minutes followed by 1000 mg IV every 12 hours extended infusion over 240 minutes for 7 days    ALEIDA CARNEY, PHARM D, 3/12/2025, 9:35 AM

## 2025-03-13 ENCOUNTER — APPOINTMENT (OUTPATIENT)
Dept: GENERAL RADIOLOGY | Age: 75
DRG: 069 | End: 2025-03-13
Payer: MEDICARE

## 2025-03-13 LAB
ABO/RH: NORMAL
ACANTHOCYTES BLD QL SMEAR: ABNORMAL
ANION GAP SERPL CALCULATED.3IONS-SCNC: 12 MMOL/L (ref 8–16)
ANISOCYTOSIS BLD QL SMEAR: ABNORMAL
ANTIBODY SCREEN: NORMAL
BASOPHILS # BLD: 0 K/UL (ref 0–0.2)
BASOPHILS NFR BLD: 0 % (ref 0–1)
BLOOD BANK DISPENSE STATUS: NORMAL
BLOOD BANK DISPENSE STATUS: NORMAL
BLOOD BANK PRODUCT CODE: NORMAL
BLOOD BANK PRODUCT CODE: NORMAL
BPU ID: NORMAL
BPU ID: NORMAL
BUN SERPL-MCNC: 55 MG/DL (ref 8–23)
BURR CELLS BLD QL SMEAR: ABNORMAL
CALCIUM SERPL-MCNC: 7.1 MG/DL (ref 8.8–10.2)
CHLORIDE SERPL-SCNC: 96 MMOL/L (ref 98–107)
CO2 SERPL-SCNC: 23 MMOL/L (ref 22–29)
CREAT SERPL-MCNC: 4.3 MG/DL (ref 0.5–0.9)
DESCRIPTION BLOOD BANK: NORMAL
DESCRIPTION BLOOD BANK: NORMAL
EOSINOPHIL # BLD: 0.48 K/UL (ref 0–0.6)
EOSINOPHIL NFR BLD: 2 % (ref 0–5)
ERYTHROCYTE [DISTWIDTH] IN BLOOD BY AUTOMATED COUNT: 15.4 % (ref 11.5–14.5)
GLUCOSE BLD-MCNC: 101 MG/DL (ref 70–99)
GLUCOSE BLD-MCNC: 109 MG/DL (ref 70–99)
GLUCOSE BLD-MCNC: 148 MG/DL (ref 70–99)
GLUCOSE BLD-MCNC: 99 MG/DL (ref 70–99)
GLUCOSE SERPL-MCNC: 83 MG/DL (ref 70–99)
HCT VFR BLD AUTO: 17.3 % (ref 37–47)
HCT VFR BLD AUTO: 19.2 % (ref 37–47)
HGB BLD-MCNC: 5.8 G/DL (ref 12–16)
HGB BLD-MCNC: 6.4 G/DL (ref 12–16)
HYPOCHROMIA BLD QL SMEAR: ABNORMAL
IMM GRANULOCYTES # BLD: 2.4 K/UL
INR PPP: 2.31 (ref 0.88–1.18)
LYMPHOCYTES # BLD: 1.7 K/UL (ref 1.1–4.5)
LYMPHOCYTES NFR BLD: 7 % (ref 20–40)
MAGNESIUM SERPL-MCNC: 1.6 MG/DL (ref 1.6–2.4)
MCH RBC QN AUTO: 30.2 PG (ref 27–31)
MCHC RBC AUTO-ENTMCNC: 33.5 G/DL (ref 33–37)
MCV RBC AUTO: 90.1 FL (ref 81–99)
METAMYELOCYTES NFR BLD MANUAL: 1 %
MICROCYTES BLD QL SMEAR: ABNORMAL
MONOCYTES # BLD: 1.2 K/UL (ref 0–0.9)
MONOCYTES NFR BLD: 5 % (ref 0–10)
MYELOCYTES NFR BLD MANUAL: 2 %
NEUTROPHILS # BLD: 20.6 K/UL (ref 1.5–7.5)
NEUTS BAND NFR BLD MANUAL: 18 % (ref 0–5)
NEUTS SEG NFR BLD: 64 % (ref 50–65)
NEUTS VAC BLD QL SMEAR: ABNORMAL
OVALOCYTES BLD QL SMEAR: ABNORMAL
PERFORMED ON: ABNORMAL
PERFORMED ON: NORMAL
PLATELET # BLD AUTO: 218 K/UL (ref 130–400)
PLATELET SLIDE REVIEW: ADEQUATE
PMV BLD AUTO: 11.5 FL (ref 9.4–12.3)
POTASSIUM SERPL-SCNC: 3.4 MMOL/L (ref 3.5–5)
PROMYELOCYTES NFR BLD MANUAL: 1 %
PROTHROMBIN TIME: 24.8 SEC (ref 12–14.6)
RBC # BLD AUTO: 1.92 M/UL (ref 4.2–5.4)
REASON FOR REJECTION: NORMAL
REASON FOR REJECTION: NORMAL
REJECTED TEST: NORMAL
REJECTED TEST: NORMAL
SCHISTOCYTES BLD QL SMEAR: ABNORMAL
SODIUM SERPL-SCNC: 131 MMOL/L (ref 136–145)
TOXIC GRANULATION: ABNORMAL
VANCOMYCIN SERPL-MCNC: 17.7 UG/ML
WBC # BLD AUTO: 23.9 K/UL (ref 4.8–10.8)

## 2025-03-13 PROCEDURE — 51702 INSERT TEMP BLADDER CATH: CPT

## 2025-03-13 PROCEDURE — 86900 BLOOD TYPING SEROLOGIC ABO: CPT

## 2025-03-13 PROCEDURE — 30233N1 TRANSFUSION OF NONAUTOLOGOUS RED BLOOD CELLS INTO PERIPHERAL VEIN, PERCUTANEOUS APPROACH: ICD-10-PCS | Performed by: INTERNAL MEDICINE

## 2025-03-13 PROCEDURE — 1200000000 HC SEMI PRIVATE

## 2025-03-13 PROCEDURE — 94640 AIRWAY INHALATION TREATMENT: CPT

## 2025-03-13 PROCEDURE — 83735 ASSAY OF MAGNESIUM: CPT

## 2025-03-13 PROCEDURE — 6370000000 HC RX 637 (ALT 250 FOR IP): Performed by: NURSE PRACTITIONER

## 2025-03-13 PROCEDURE — 6370000000 HC RX 637 (ALT 250 FOR IP): Performed by: INTERNAL MEDICINE

## 2025-03-13 PROCEDURE — 6370000000 HC RX 637 (ALT 250 FOR IP)

## 2025-03-13 PROCEDURE — 86850 RBC ANTIBODY SCREEN: CPT

## 2025-03-13 PROCEDURE — 2580000003 HC RX 258: Performed by: NURSE PRACTITIONER

## 2025-03-13 PROCEDURE — 80202 ASSAY OF VANCOMYCIN: CPT

## 2025-03-13 PROCEDURE — 85014 HEMATOCRIT: CPT

## 2025-03-13 PROCEDURE — 85025 COMPLETE CBC W/AUTO DIFF WBC: CPT

## 2025-03-13 PROCEDURE — 6360000002 HC RX W HCPCS: Performed by: NURSE PRACTITIONER

## 2025-03-13 PROCEDURE — 85018 HEMOGLOBIN: CPT

## 2025-03-13 PROCEDURE — 2500000003 HC RX 250 WO HCPCS

## 2025-03-13 PROCEDURE — 71046 X-RAY EXAM CHEST 2 VIEWS: CPT

## 2025-03-13 PROCEDURE — 80048 BASIC METABOLIC PNL TOTAL CA: CPT

## 2025-03-13 PROCEDURE — P9016 RBC LEUKOCYTES REDUCED: HCPCS

## 2025-03-13 PROCEDURE — P9040 RBC LEUKOREDUCED IRRADIATED: HCPCS

## 2025-03-13 PROCEDURE — 86923 COMPATIBILITY TEST ELECTRIC: CPT

## 2025-03-13 PROCEDURE — 94760 N-INVAS EAR/PLS OXIMETRY 1: CPT

## 2025-03-13 PROCEDURE — 36415 COLL VENOUS BLD VENIPUNCTURE: CPT

## 2025-03-13 PROCEDURE — 36430 TRANSFUSION BLD/BLD COMPNT: CPT

## 2025-03-13 PROCEDURE — 82962 GLUCOSE BLOOD TEST: CPT

## 2025-03-13 PROCEDURE — 86901 BLOOD TYPING SEROLOGIC RH(D): CPT

## 2025-03-13 PROCEDURE — 2580000003 HC RX 258: Performed by: INTERNAL MEDICINE

## 2025-03-13 PROCEDURE — 85610 PROTHROMBIN TIME: CPT

## 2025-03-13 RX ORDER — SODIUM CHLORIDE 9 MG/ML
INJECTION, SOLUTION INTRAVENOUS PRN
Status: DISCONTINUED | OUTPATIENT
Start: 2025-03-13 | End: 2025-03-18 | Stop reason: HOSPADM

## 2025-03-13 RX ORDER — SODIUM CHLORIDE 9 MG/ML
INJECTION, SOLUTION INTRAVENOUS CONTINUOUS
Status: DISCONTINUED | OUTPATIENT
Start: 2025-03-13 | End: 2025-03-15

## 2025-03-13 RX ADMIN — SODIUM CHLORIDE 1000 MG: 9 INJECTION, SOLUTION INTRAVENOUS at 09:42

## 2025-03-13 RX ADMIN — MIDODRINE HYDROCHLORIDE 5 MG: 5 TABLET ORAL at 16:38

## 2025-03-13 RX ADMIN — SUCRALFATE 1 G: 1 TABLET ORAL at 16:38

## 2025-03-13 RX ADMIN — PANTOPRAZOLE SODIUM 40 MG: 40 TABLET, DELAYED RELEASE ORAL at 16:38

## 2025-03-13 RX ADMIN — SODIUM CHLORIDE, PRESERVATIVE FREE 10 ML: 5 INJECTION INTRAVENOUS at 08:34

## 2025-03-13 RX ADMIN — MIDODRINE HYDROCHLORIDE 5 MG: 5 TABLET ORAL at 12:56

## 2025-03-13 RX ADMIN — SODIUM CHLORIDE 1000 MG: 9 INJECTION, SOLUTION INTRAVENOUS at 21:16

## 2025-03-13 RX ADMIN — MIDODRINE HYDROCHLORIDE 5 MG: 5 TABLET ORAL at 08:33

## 2025-03-13 RX ADMIN — METRONIDAZOLE 500 MG: 5 INJECTION, SOLUTION INTRAVENOUS at 23:39

## 2025-03-13 RX ADMIN — CALCITRIOL CAPSULES 0.25 MCG 0.5 MCG: 0.25 CAPSULE ORAL at 08:33

## 2025-03-13 RX ADMIN — SODIUM CHLORIDE, PRESERVATIVE FREE 10 ML: 5 INJECTION INTRAVENOUS at 21:06

## 2025-03-13 RX ADMIN — TIOTROPIUM BROMIDE AND OLODATEROL 2 PUFF: 3.124; 2.736 SPRAY, METERED RESPIRATORY (INHALATION) at 07:26

## 2025-03-13 RX ADMIN — VANCOMYCIN HYDROCHLORIDE 1000 MG: 1 INJECTION, POWDER, LYOPHILIZED, FOR SOLUTION INTRAVENOUS at 18:19

## 2025-03-13 RX ADMIN — SUCRALFATE 1 G: 1 TABLET ORAL at 12:56

## 2025-03-13 RX ADMIN — SUCRALFATE 1 G: 1 TABLET ORAL at 21:04

## 2025-03-13 RX ADMIN — METRONIDAZOLE 500 MG: 5 INJECTION, SOLUTION INTRAVENOUS at 08:34

## 2025-03-13 RX ADMIN — WARFARIN SODIUM 6 MG: 5 TABLET ORAL at 16:38

## 2025-03-13 RX ADMIN — ASPIRIN 81 MG: 81 TABLET, CHEWABLE ORAL at 08:34

## 2025-03-13 RX ADMIN — DICLOFENAC SODIUM 2 G: 10 GEL TOPICAL at 21:04

## 2025-03-13 RX ADMIN — ESCITALOPRAM OXALATE 20 MG: 10 TABLET ORAL at 08:33

## 2025-03-13 RX ADMIN — ROSUVASTATIN 5 MG: 10 TABLET, FILM COATED ORAL at 08:33

## 2025-03-13 RX ADMIN — DICLOFENAC SODIUM 2 G: 10 GEL TOPICAL at 08:38

## 2025-03-13 RX ADMIN — SODIUM CHLORIDE: 9 INJECTION, SOLUTION INTRAVENOUS at 12:50

## 2025-03-13 RX ADMIN — METRONIDAZOLE 500 MG: 5 INJECTION, SOLUTION INTRAVENOUS at 16:39

## 2025-03-13 ASSESSMENT — PAIN SCALES - GENERAL
PAINLEVEL_OUTOF10: 9
PAINLEVEL_OUTOF10: 0

## 2025-03-13 NOTE — PROGRESS NOTES
Lab calls and states repeat labs are still way off from previous labs that were drawn and they think they need to be redrawn on opposite arm.

## 2025-03-13 NOTE — PROGRESS NOTES
[facility]   Pharmacy Pharmacokinetic Monitoring Service - Vancomycin    Consulting Provider: Carol ROCA   Indication: nosocomial pneumonia  Target Concentration: Trough 15-20 mg/L, switch to -600 once renal function stable  Day of Therapy: 2  Additional Antimicrobials: Cefepime    Pertinent Laboratory Values:   Wt Readings from Last 1 Encounters:   03/06/25 108.9 kg (240 lb)     Temp Readings from Last 1 Encounters:   03/13/25 98.2 °F (36.8 °C) (Temporal)     Estimated Creatinine Clearance: 14 mL/min (A) (based on SCr of 4.3 mg/dL (H)).  Recent Labs     03/12/25  0825 03/13/25  0648 03/13/25  0938   CREATININE 5.6*  --  4.3*   BUN 58*  --  55*   WBC 21.7* 23.9*  --      Procalcitonin: 33 (03/11/25)    Pertinent Cultures:  Culture Date Source Results   03/11/25 Blood Sent   03/11/25 MRSA No result   03/11/25  Resp PCR panel No result   03/12/25 C diff No result      MRSA Nasal Swab: not detected     Recent vancomycin administrations                     vancomycin (VANCOCIN) 2,500 mg in sodium chloride 0.9 % 500 mL IVPB (mg) 2,500 mg New Bag 03/12/25 3267                    Assessment:  Date/Time Current Dose Concentration Timing of Concentration (h) AUC   03/13/25 pulse 17.7 24 H 30 M    Note: Serum concentrations collected for AUC dosing may appear elevated if collected in close proximity to the dose administered, this is not necessarily an indication of toxicity    Plan:  Current dosing regimen is therapeutic  Give Vancomycin 1000 mg IV x 1 dose today.  Repeat vancomycin concentration ordered for 03/14/25 @ 1630   Pharmacy will continue to monitor patient and adjust therapy as indicated    Thank you for the consult,  Wilmer Asif RPH  3/13/2025 3:35 PM

## 2025-03-13 NOTE — PROGRESS NOTES
Nutrition Assessment     Type and Reason for Visit: Reassess    Nutrition Recommendations/Plan:   Follow for decreasing diarrhea, increased diet, labs     Malnutrition Assessment:  Malnutrition Status: At risk for malnutrition    Nutrition Assessment:  Patient remians NPO.  New weight not available.  BM remains loose.  Na+ 131, BUN 55, Creat 4.3, Glucose 100-135. Accuchek's , GFR 10    Estimated Daily Nutrient Needs:  Energy (kcal):  6876-6177 kcals (15-18 kcals/kg) Weight Used for Energy Requirements: Current     Protein (g):  74-123g Weight Used for Protein Requirements: Ideal        Fluid (ml/day):  8787-9720 ml Method Used for Fluid Requirements: 1 ml/kcal    Nutrition Related Findings:   HX- gastric bypass.  +1 BLE edema Wound Type: None    Current Nutrition Therapies:    Diet NPO    Anthropometric Measures:  Height: 170.2 cm (5' 7.01\")  Current Body Wt: 108.9 kg (240 lb 1.3 oz)   BMI: 37.6        Nutrition Diagnosis:   Altered nutrition-related lab values, Inadequate oral intake related to renal dysfunction, acute injury/trauma as evidenced by lab values, NPO or clear liquid status due to medical condition    Nutrition Interventions:   Food and/or Nutrient Delivery: Continue NPO  Nutrition Education/Counseling: No recommendation at this time  Coordination of Nutrition Care: Continue to monitor while inpatient       Goals:  Goals: Meet at least 75% of estimated needs, Maintain adequate nutrition status  Type of Goal: Continue current goal  Previous Goal Met: No Progress toward Goal(s)    Nutrition Monitoring and Evaluation:   Behavioral-Environmental Outcomes: None Identified  Food/Nutrient Intake Outcomes: Progression of Nutrition, Diet Advancement/Tolerance  Physical Signs/Symptoms Outcomes: Biochemical Data, Diarrhea, Nausea or Vomiting, Fluid Status or Edema, Weight, Skin    Discharge Planning:    Too soon to determine     Patsy Mcdaniels, MS, RD, LD  Contact: [phone]

## 2025-03-13 NOTE — PROGRESS NOTES
Clinical Pharmacy Note    Warfarin consult follow-up    Recent Labs     03/13/25  0648   INR 2.31*     Recent Labs     03/11/25  1004 03/12/25  0825 03/13/25  0648   HGB 8.4* 7.2* 5.8*   HCT 27.0* 21.8* 17.3*    218 218     Current warfarin drug-drug interactions:      Amiodarone-Monitor patients extra closely for evidence of increased anticoagulant effects if amiodarone is initiated/dose increased, or decreased effects if amiodarone is discontinued/dose decreased, though due to the long half-life of amiodarone, any interaction may persist for several days/weeks following any dose decrease or discontinuation.   Carafate-Specifically, sucralfate may decrease the absorption of Vitamin K Antagonists.   Crestor-HMG-CoA Reductase Inhibitors (Statins) may enhance the anticoagulant effect of Vitamin K Antagonists.   Lexapro- Closely monitor patients for clinical and laboratory evidence of bleeding if these agents are combined.  Synthroid-Monitor for increased anticoagulant effects of vitamin K antagonists if a thyroid product is initiated/dose increased, or decreased effects if a thyroid product is discontinued/dose decreased.      New Drug Interactions:   Cefepime- Monitor for elevated INR and bleeding if a vitamin K antagonist is used in combination with cephalosporins.   Flagyl-Consider alternatives to concomitant therapy with metronidazole and vitamin K antagonists. If concomitant therapy cannot be avoided, consider reducing the dose of the vitamin K antagonist and monitor for increased INR/bleeding if metronidazole is initiated/dose increased.     Date INR Warfarin Dose   03/06/25 1.39 5 mg ordered but not taken   03/07/25   1.19 5 mg     03/08/25   1.24 5 mg     03/09/25   1.30 6 mg    03/10/25  1.49  6 mg   03/11/25  1.95  6 mg     03/12/25 2.17  6 mg    03/13/25 2.31 6 mg          Notes: Coumadin 6mg po X1 tonight.                  Daily PT/INR until stable within therapeutic range.     Electronically signed

## 2025-03-13 NOTE — PROGRESS NOTES
Renal Progress Note    Thank you to requesting provider: Dr Minaya, for asking us to see Leny Stone    Reason for consultation:  MIREYA, CKD    Chief Complaint:  Change in mental status.     History of Presenting Illness      Patient is a 74-year-old -American woman with a past medical history of type 2 diabetes, CHF, DVT Lupus anticoagulant, obesity status post gastric bypass, COPD, hypertension, atrial fibrillation and chronic kidney disease stage 3b followed by Dr. Connor at the renal office.  Patient was admitted on March 6 with acute change to her mental status.  She was initially very hypertensive.  A CT angiogram of the head and neck was obtained and she was found there was no high-grade stenosis in her brain circulation.  Her serum creatinine was 1.6 on admission with GFR at 33.  Patient was initially cell to have a TIA/CVA.  She was seen and followed by neurology.  She was also seen by cardiology.  She has received diuretics.  And over the last day, patient was having diarrhea.  Her renal function has progressively worsened with serum creatinine up to 2.5 on March 10 and 2 4 on March 11.  Renal service is consulted to manage her MIREYA.  Nurse has noticed drop tin her urine output.  Patient is a bit sleepy and was a poor historian and not much verbal.   Patient is more awake now. Daughter at bedside. She is on IV fluids and her output has improved some.  She has a Navas catheter.  Her diarrhea has also improved some.    Past Medical/Surgical History      Active Ambulatory Problems     Diagnosis Date Noted    Gastroesophageal reflux disease 05/02/2014    History of gastric bypass 05/02/2014    Family history of colon cancer 05/02/2014    Fibromyalgia 03/14/2014    Encounter for current long-term use of anticoagulants 02/22/2018    Primary osteoarthritis of right knee 03/22/2018    Arthritis of knee 03/26/2018    Hypertensive disorder 09/22/2016    Hyperglycemia 03/26/2018    Complex sleep apnea syndrome

## 2025-03-13 NOTE — PLAN OF CARE
Problem: Nutrition Deficit:  Goal: Optimize nutritional status  Outcome: Not Progressing  Flowsheets (Taken 3/13/2025 1016)  Nutrient intake appropriate for improving, restoring, or maintaining nutritional needs: Assess nutritional status and recommend course of action

## 2025-03-13 NOTE — CONSENT
Informed Consent for Blood Component Transfusion Note    I have discussed with the daughter the rationale for blood component transfusion; its benefits in treating or preventing fatigue, organ damage, or death; and its risk which includes mild transfusion reactions, rare risk of blood borne infection, or more serious but rare reactions. I have discussed the alternatives to transfusion, including the risk and consequences of not receiving transfusion. The daughter had an opportunity to ask questions and had agreed to proceed with transfusion of blood components.    Electronically signed by CHANELLE Simms CNP on 3/13/25 at 7:33 AM ROSELINE

## 2025-03-13 NOTE — PROGRESS NOTES
[facility]      [facility] Note    Patient:  Leny Stone  YOB: 1950  Date of Service: 3/13/2025  MRN: [id number]   Acct: [id number]   Primary Care Physician: Regina Curtis MD  Advance Directive: Full Code  Admit Date: 3/6/2025       Hospital Day: 7    Portions of this note have been copied forward, however, updated to reflect the most current clinical status of this patient.     CHIEF COMPLAINT altered mental status    SUBJECTIVE: Patient reports being hungry     CUMULATIVE HOSPITAL COURSE:   Patient is 74-year-old past medical history of lupus, hypertension, CKD stage III, CHF, hypothyroidism, anxiety, type 2 diabetes and history of A-fib on Coumadin.  Patient was brought in by family member after her son-in-law found patient with altered mental status.  Code stroke was called in the ED.  Family also reported patient is not always compliant with her medications.  She does not always wear her CPAP either.  She was found to have be lethargic and only give one-word answers.  ER workup CT head no acute intracranial hemorrhage or findings, CTA of the head neck no acute process CT brain perfusion no acute infarct or ischemia CT of the chest no acute findings abdomen and pelvis negative she does have a inferior vena cava Tessa filter, bilateral lower lobe mild segmental atelectasis lab white count 7.2 hemoglobin 9.9 hematocrit 37.2 platelets 88 glucose 68 troponin 29 lactic 1.8 BUN 19 creatinine 1.6 GFR 33 patient admitted to the hospitalist service neurology was consulted.  MRI of the brain and the EEG.  MRI negative for acute findings .  Echo completed and cardiology consulted due to severely dilated left atrium.  D-dimer was also felt elevated and VQ scan was done which was negative.  EEG nonspecific no clear seizure activity.  Neurology felt like this event was a TIA and recommended continuing full anticoagulation.  Patient was due to have a Tonia today but cannot since she had a lung scan  angiography of the head and neck was performed with 3D/MIP coronal and sagittal reconstructions for evaluation of the cervical vasculature as well as the [place].  Internal carotid artery stenosis are assessed utilizing NASCET criteria.  FINDINGS:  Motion artifacts are noted.  Small calcified plaques are noted along the aortic arch.  The bilateral common carotid arteries, carotid bifurcations, and cervical segments of the internal carotid arteries are normal in contour and caliber.  The distal cervical, petrous, cavernous, and supraclinoid segments of the internal carotid arteries are normal in contour and caliber.  The extracranial vertebral arteries normal in contour and caliber.  The anterior and middle cerebral arteries are normal in contour and caliber without large vessel occlusion.  The vertebrobasilar system is patent. The superior cerebellar and posterior cerebral arteries are normal in contour and caliber.  There is no intracranial aneurysm or arteriovenous malformation.  There is multilevel disc desiccation and degenerative change involving posterior disc osteophyte complexes, facet joint hypertrophy, and uncovertebral joint hypertrophy resulting in various degrees of central canal and neural foraminal stenosis.       Motion degraded study. 1. No extracranial carotid or vertebral artery stenosis. 2. No large vessel occlusion or high grade stenosis within the [place].  All CT scans are performed using dose optimization techniques as appropriate to the performed exam and include at least one of the following: Automated exposure control, adjustment of the mA and/or kV according to size, and the use of iterative reconstruction technique.  ______________________________________ Electronically signed by: KATE GREGORY M.D. Date:     03/06/2025 Time:    13:51     CT BRAIN PERFUSION  Result Date: 3/6/2025  EXAMINATION: COMPUTED TOMOGRAPHY PERFUSION IMAGING OF THE HEAD WITH CONTRAST.  HISTORY:

## 2025-03-14 LAB
ACANTHOCYTES BLD QL SMEAR: ABNORMAL
ANION GAP SERPL CALCULATED.3IONS-SCNC: 9 MMOL/L (ref 8–16)
ANISOCYTOSIS BLD QL SMEAR: ABNORMAL
BASOPHILS # BLD: 0.4 K/UL (ref 0–0.2)
BASOPHILS NFR BLD: 2 % (ref 0–1)
BUN SERPL-MCNC: 50 MG/DL (ref 8–23)
CALCIUM SERPL-MCNC: 7.5 MG/DL (ref 8.8–10.2)
CHLORIDE SERPL-SCNC: 102 MMOL/L (ref 98–107)
CO2 SERPL-SCNC: 23 MMOL/L (ref 22–29)
CREAT SERPL-MCNC: 3.1 MG/DL (ref 0.5–0.9)
DACRYOCYTES BLD QL SMEAR: ABNORMAL
EOSINOPHIL # BLD: 0.45 K/UL (ref 0–0.6)
EOSINOPHIL NFR BLD: 2 % (ref 0–5)
ERYTHROCYTE [DISTWIDTH] IN BLOOD BY AUTOMATED COUNT: 15.3 % (ref 11.5–14.5)
GLUCOSE BLD-MCNC: 108 MG/DL (ref 70–99)
GLUCOSE BLD-MCNC: 118 MG/DL (ref 70–99)
GLUCOSE BLD-MCNC: 132 MG/DL (ref 70–99)
GLUCOSE SERPL-MCNC: 114 MG/DL (ref 70–99)
HCT VFR BLD AUTO: 21.2 % (ref 37–47)
HCT VFR BLD AUTO: 21.9 % (ref 37–47)
HCT VFR BLD AUTO: 22.5 % (ref 37–47)
HGB BLD-MCNC: 7.2 G/DL (ref 12–16)
HGB BLD-MCNC: 7.4 G/DL (ref 12–16)
HGB BLD-MCNC: 7.6 G/DL (ref 12–16)
IMM GRANULOCYTES # BLD: 2.5 K/UL
INR PPP: 2.2 (ref 0.88–1.18)
LYMPHOCYTES # BLD: 1.1 K/UL (ref 1.1–4.5)
LYMPHOCYTES NFR BLD: 5 % (ref 20–40)
MAGNESIUM SERPL-MCNC: 1.7 MG/DL (ref 1.6–2.4)
MCH RBC QN AUTO: 30.4 PG (ref 27–31)
MCHC RBC AUTO-ENTMCNC: 34 G/DL (ref 33–37)
MCV RBC AUTO: 89.5 FL (ref 81–99)
METAMYELOCYTES NFR BLD MANUAL: 6 %
MICROCYTES BLD QL SMEAR: ABNORMAL
MONOCYTES # BLD: 1.3 K/UL (ref 0–0.9)
MONOCYTES NFR BLD: 6 % (ref 0–10)
MYELOCYTES NFR BLD MANUAL: 2 %
NEUTROPHILS # BLD: 19 K/UL (ref 1.5–7.5)
NEUTS BAND NFR BLD MANUAL: 11 % (ref 0–5)
NEUTS SEG NFR BLD: 66 % (ref 50–65)
OVALOCYTES BLD QL SMEAR: ABNORMAL
PERFORMED ON: ABNORMAL
PLATELET # BLD AUTO: 254 K/UL (ref 130–400)
PLATELET SLIDE REVIEW: ADEQUATE
PMV BLD AUTO: 11.5 FL (ref 9.4–12.3)
POIKILOCYTOSIS BLD QL SMEAR: ABNORMAL
POTASSIUM SERPL-SCNC: 3.2 MMOL/L (ref 3.5–5)
POTASSIUM SERPL-SCNC: 3.2 MMOL/L (ref 3.5–5.1)
PROTHROMBIN TIME: 23.9 SEC (ref 12–14.6)
RBC # BLD AUTO: 2.37 M/UL (ref 4.2–5.4)
SCHISTOCYTES BLD QL SMEAR: ABNORMAL
SODIUM SERPL-SCNC: 134 MMOL/L (ref 136–145)
TOXIC GRANULATION: ABNORMAL
WBC # BLD AUTO: 22.4 K/UL (ref 4.8–10.8)

## 2025-03-14 PROCEDURE — 6370000000 HC RX 637 (ALT 250 FOR IP): Performed by: NURSE PRACTITIONER

## 2025-03-14 PROCEDURE — 6370000000 HC RX 637 (ALT 250 FOR IP): Performed by: HOSPITALIST

## 2025-03-14 PROCEDURE — 6360000002 HC RX W HCPCS: Performed by: NURSE PRACTITIONER

## 2025-03-14 PROCEDURE — 2580000003 HC RX 258: Performed by: INTERNAL MEDICINE

## 2025-03-14 PROCEDURE — 85014 HEMATOCRIT: CPT

## 2025-03-14 PROCEDURE — 85610 PROTHROMBIN TIME: CPT

## 2025-03-14 PROCEDURE — 36415 COLL VENOUS BLD VENIPUNCTURE: CPT

## 2025-03-14 PROCEDURE — 85018 HEMOGLOBIN: CPT

## 2025-03-14 PROCEDURE — 1200000000 HC SEMI PRIVATE

## 2025-03-14 PROCEDURE — 6370000000 HC RX 637 (ALT 250 FOR IP)

## 2025-03-14 PROCEDURE — 97535 SELF CARE MNGMENT TRAINING: CPT

## 2025-03-14 PROCEDURE — 6370000000 HC RX 637 (ALT 250 FOR IP): Performed by: INTERNAL MEDICINE

## 2025-03-14 PROCEDURE — 97530 THERAPEUTIC ACTIVITIES: CPT

## 2025-03-14 PROCEDURE — 85025 COMPLETE CBC W/AUTO DIFF WBC: CPT

## 2025-03-14 PROCEDURE — 94640 AIRWAY INHALATION TREATMENT: CPT

## 2025-03-14 PROCEDURE — 2580000003 HC RX 258: Performed by: NURSE PRACTITIONER

## 2025-03-14 PROCEDURE — 82962 GLUCOSE BLOOD TEST: CPT

## 2025-03-14 PROCEDURE — 2500000003 HC RX 250 WO HCPCS

## 2025-03-14 PROCEDURE — 80048 BASIC METABOLIC PNL TOTAL CA: CPT

## 2025-03-14 PROCEDURE — 94150 VITAL CAPACITY TEST: CPT

## 2025-03-14 PROCEDURE — 83735 ASSAY OF MAGNESIUM: CPT

## 2025-03-14 PROCEDURE — 94760 N-INVAS EAR/PLS OXIMETRY 1: CPT

## 2025-03-14 RX ORDER — POTASSIUM CHLORIDE 1500 MG/1
20 TABLET, EXTENDED RELEASE ORAL ONCE
Status: COMPLETED | OUTPATIENT
Start: 2025-03-14 | End: 2025-03-14

## 2025-03-14 RX ORDER — ESCITALOPRAM OXALATE 10 MG/1
20 TABLET ORAL NIGHTLY
Status: DISCONTINUED | OUTPATIENT
Start: 2025-03-14 | End: 2025-03-18 | Stop reason: HOSPADM

## 2025-03-14 RX ADMIN — TIOTROPIUM BROMIDE AND OLODATEROL 2 PUFF: 3.124; 2.736 SPRAY, METERED RESPIRATORY (INHALATION) at 06:14

## 2025-03-14 RX ADMIN — PANTOPRAZOLE SODIUM 40 MG: 40 TABLET, DELAYED RELEASE ORAL at 17:01

## 2025-03-14 RX ADMIN — ROSUVASTATIN 5 MG: 10 TABLET, FILM COATED ORAL at 06:10

## 2025-03-14 RX ADMIN — ASPIRIN 81 MG: 81 TABLET, CHEWABLE ORAL at 06:11

## 2025-03-14 RX ADMIN — ESCITALOPRAM OXALATE 20 MG: 10 TABLET ORAL at 20:28

## 2025-03-14 RX ADMIN — WARFARIN SODIUM 6 MG: 5 TABLET ORAL at 17:01

## 2025-03-14 RX ADMIN — SUCRALFATE 1 G: 1 TABLET ORAL at 17:01

## 2025-03-14 RX ADMIN — LEVOTHYROXINE SODIUM 100 MCG: 100 TABLET ORAL at 06:10

## 2025-03-14 RX ADMIN — METRONIDAZOLE 500 MG: 5 INJECTION, SOLUTION INTRAVENOUS at 06:16

## 2025-03-14 RX ADMIN — MIDODRINE HYDROCHLORIDE 5 MG: 5 TABLET ORAL at 17:01

## 2025-03-14 RX ADMIN — POTASSIUM CHLORIDE 20 MEQ: 1500 TABLET, EXTENDED RELEASE ORAL at 07:32

## 2025-03-14 RX ADMIN — SUCRALFATE 1 G: 1 TABLET ORAL at 20:28

## 2025-03-14 RX ADMIN — SODIUM CHLORIDE: 9 INJECTION, SOLUTION INTRAVENOUS at 07:38

## 2025-03-14 RX ADMIN — SUCRALFATE 1 G: 1 TABLET ORAL at 10:31

## 2025-03-14 RX ADMIN — PANTOPRAZOLE SODIUM 40 MG: 40 TABLET, DELAYED RELEASE ORAL at 06:11

## 2025-03-14 RX ADMIN — DICLOFENAC SODIUM 2 G: 10 GEL TOPICAL at 20:28

## 2025-03-14 RX ADMIN — SODIUM CHLORIDE, PRESERVATIVE FREE 10 ML: 5 INJECTION INTRAVENOUS at 07:35

## 2025-03-14 RX ADMIN — SUCRALFATE 1 G: 1 TABLET ORAL at 06:10

## 2025-03-14 RX ADMIN — METRONIDAZOLE 500 MG: 5 INJECTION, SOLUTION INTRAVENOUS at 17:04

## 2025-03-14 RX ADMIN — MIDODRINE HYDROCHLORIDE 5 MG: 5 TABLET ORAL at 13:26

## 2025-03-14 RX ADMIN — AMIODARONE HYDROCHLORIDE 100 MG: 200 TABLET ORAL at 06:09

## 2025-03-14 RX ADMIN — DICLOFENAC SODIUM 2 G: 10 GEL TOPICAL at 06:12

## 2025-03-14 RX ADMIN — MIDODRINE HYDROCHLORIDE 5 MG: 5 TABLET ORAL at 06:11

## 2025-03-14 RX ADMIN — SODIUM CHLORIDE 1000 MG: 9 INJECTION, SOLUTION INTRAVENOUS at 10:33

## 2025-03-14 RX ADMIN — SODIUM CHLORIDE 1000 MG: 9 INJECTION, SOLUTION INTRAVENOUS at 20:30

## 2025-03-14 RX ADMIN — CALCITRIOL CAPSULES 0.25 MCG 0.5 MCG: 0.25 CAPSULE ORAL at 06:09

## 2025-03-14 ASSESSMENT — PAIN SCALES - GENERAL
PAINLEVEL_OUTOF10: 0

## 2025-03-14 NOTE — PLAN OF CARE
Problem: Chronic Conditions and Co-morbidities  Goal: Patient's chronic conditions and co-morbidity symptoms are monitored and maintained or improved  Outcome: Progressing  Flowsheets (Taken 3/13/2025 2208)  Care Plan - Patient's Chronic Conditions and Co-Morbidity Symptoms are Monitored and Maintained or Improved: Monitor and assess patient's chronic conditions and comorbid symptoms for stability, deterioration, or improvement     Problem: Discharge Planning  Goal: Discharge to home or other facility with appropriate resources  Outcome: Progressing  Flowsheets (Taken 3/13/2025 2208)  Discharge to home or other facility with appropriate resources: Identify barriers to discharge with patient and caregiver     Problem: Skin/Tissue Integrity  Goal: Skin integrity remains intact  Description: 1.  Monitor for areas of redness and/or skin breakdown  2.  Assess vascular access sites hourly  3.  Every 4-6 hours minimum:  Change oxygen saturation probe site  4.  Every 4-6 hours:  If on nasal continuous positive airway pressure, respiratory therapy assess nares and determine need for appliance change or resting period  Outcome: Progressing  Flowsheets  Taken 3/14/2025 0200  Skin Integrity Remains Intact: Monitor for areas of redness and/or skin breakdown  Taken 3/13/2025 2208  Skin Integrity Remains Intact: Monitor for areas of redness and/or skin breakdown     Problem: Safety - Adult  Goal: Free from fall injury  Outcome: Progressing  Flowsheets (Taken 3/14/2025 0200)  Free From Fall Injury: Instruct family/caregiver on patient safety     Problem: Confusion  Goal: Confusion, delirium, dementia, or psychosis is improved or at baseline  Description: INTERVENTIONS:  1. Assess for possible contributors to thought disturbance, including medications, impaired vision or hearing, underlying metabolic abnormalities, dehydration, psychiatric diagnoses, and notify attending LIP  2. [place] high risk fall precautions, as

## 2025-03-14 NOTE — PROGRESS NOTES
Occupational Therapy     03/14/25 1400   Subjective   Subjective Pt in bed with daughter present. Pt agreeable to participate.   Pain Assessment   Pain Assessment None - Denies Pain   Cognition   Overall Cognitive Status WFL   Orientation   Overall Orientation Status WFL   Bed Mobility Training   Bed Mobility Training Yes   Overall Level of Assistance Minimal assistance;Stand by assistance   Interventions Verbal cues;Tactile cues;Manual cues   Supine to Sit Minimal assistance;Stand by assistance   Scooting Minimal assistance;Stand by assistance   Transfer Training   Transfer Training Yes   Overall Level of Assistance Minimal assistance;Contact guard assistance   Interventions Verbal cues;Tactile cues;Manual cues   Sit to Stand Minimal assistance;Contact guard assistance   Stand to Sit Minimal assistance;Contact guard assistance   Bed to Chair Minimal assistance;Contact guard assistance   Toilet Transfer Minimal assistance;Contact guard assistance  (BSC)   Balance   Sitting Intact   Standing With support  (RW level)   ADL   Feeding NPO   Grooming Minimal assistance;Setup;Verbal cueing   UE Bathing Minimal assistance   LE Bathing Moderate assistance   UE Dressing Minimal assistance   LE Dressing Moderate assistance   Putting On/Taking Off Footwear Moderate assistance   Toileting Moderate assistance;Maximum assistance   Toileting Skilled Clinical Factors for clean up and clothing management   Functional Mobility Minimal assistance;Contact guard assistance   Functional Mobility Skilled Clinical Factors at RW level   Assessment   Assessment Tx focused on BSC use for toileting requiring Max assist for clean up. Pt instructed on functional mobility in room and transfer to recliner to sit up for lunch. Pt left with daughter present and all needs in reach.   Activity Tolerance Patient tolerated treatment well   Discharge Recommendations Patient would benefit from continued therapy after discharge   Occupational Therapy Plan

## 2025-03-14 NOTE — PROGRESS NOTES
Renal Progress Note    Thank you to requesting provider: Dr Minaya, for asking us to see Leny Stone    Reason for consultation:  MIREYA, CKD    Chief Complaint:  Change in mental status.     History of Presenting Illness      Patient is a 74-year-old -American woman with a past medical history of type 2 diabetes, CHF, DVT Lupus anticoagulant, obesity status post gastric bypass, COPD, hypertension, atrial fibrillation and chronic kidney disease stage 3b followed by Dr. Connor at the renal office.  Patient was admitted on March 6 with acute change to her mental status.  She was initially very hypertensive.  A CT angiogram of the head and neck was obtained and she was found there was no high-grade stenosis in her brain circulation.  Her serum creatinine was 1.6 on admission with GFR at 33.  Patient was initially cell to have a TIA/CVA.  She was seen and followed by neurology.  She was also seen by cardiology.  She has received diuretics.  And over the last day, patient was having diarrhea.  Her renal function has progressively worsened with serum creatinine up to 2.5 on March 10 and 2 4 on March 11.  Renal service is consulted to manage her MIREYA.  Nurse has noticed drop tin her urine output.  Patient is a bit sleepy and was a poor historian and not much verbal.   Patient is more awake now. She remains on IV fluids and her output has improved.  She has a Navas catheter.  Her diarrhea has also improved some.    Past Medical/Surgical History      Active Ambulatory Problems     Diagnosis Date Noted    Gastroesophageal reflux disease 05/02/2014    History of gastric bypass 05/02/2014    Family history of colon cancer 05/02/2014    Fibromyalgia 03/14/2014    Encounter for current long-term use of anticoagulants 02/22/2018    Primary osteoarthritis of right knee 03/22/2018    Arthritis of knee 03/26/2018    Hypertensive disorder 09/22/2016    Hyperglycemia 03/26/2018    Complex sleep apnea syndrome 03/26/2018    Iron  deficiency anemia 03/26/2018    Restrictive airway disease 03/27/2018    DVT, lower extremity, proximal, acute, unspecified laterality ([facility]) 07/24/2018    History of ulcer disease 05/10/2017    Anticoagulated on Coumadin     Postmenopausal osteoporosis 05/16/2019    Diabetes mellitus ([facility]) 06/15/2017    Pacemaker 10/12/2017    History of DVT (deep vein thrombosis) 10/30/2019    Lupus anticoagulant disorder 04/10/2017    History of pulmonary embolism 10/30/2019    Thrombocytopenia 12/25/2019    Hypothyroidism 12/25/2019    Morbid obesity due to excess calories 09/22/2016    Asthmatic bronchitis without complication 01/13/2020    Gastroenteritis 01/25/2020    Colic 01/25/2020    Abdominal pain 01/25/2020    Non-intractable vomiting with nausea 05/10/2017    Severe episode of recurrent major depressive disorder, without psychotic features ([facility]) 07/17/2020    Lower abdominal pain 04/07/2021    Chronic heartburn 04/07/2021    S/P gastric bypass 04/07/2021    Dog bite 04/29/2021    Cellulitis of right upper extremity 04/29/2021    Abscess of right arm 04/29/2021    Soft tissue infection 04/29/2021    Skin ulcer of upper arm, with fat layer exposed ([facility]) 05/04/2021    History of Gage-en-Y gastric bypass 01/05/2017    Hyperlipidemia 05/06/2019    Hypersomnia 06/05/2019    Nonrheumatic mitral valve regurgitation 08/16/2021    Obesity, Class II, BMI 35-39.9 02/01/2018    Peripheral edema 09/22/2016    Restless legs syndrome (RLS) 07/11/2019    Vitamin D deficiency 01/05/2017    Chronic deep vein thrombosis (DVT) of proximal vein of lower extremity ([facility]) 10/12/2021    Unspecified severe protein-calorie malnutrition 01/18/2022    H/O systemic lupus erythematosus (SLE) ([facility]) 01/18/2022    Type II diabetes mellitus with nephropathy ([facility]) 01/18/2022    Stable angina 01/18/2022    Stage 3a chronic kidney disease ([facility]) 01/18/2022    Chronic renal disease, stage III ([facility]) [[id number]] 04/24/2022    Acute kidney injury superimposed on

## 2025-03-14 NOTE — PROGRESS NOTES
Physical Therapy  Name: Leny Stone  MRN:  [id number]  Date of service:  3/14/2025       03/14/25 1258   Restrictions/Precautions   Restrictions/Precautions Fall Risk;Contact Precautions   Activity Level Up with Assist   Required Braces or Orthoses? No   General   Chart Reviewed Yes   Family/Caregiver Present Yes   Referring Practitioner Lakshmi Ramirez APRN - CNP   Subjective   Subjective pt in bed, agrees to therapy  dtr present  Dtr reports pt is better today with improved kidneys, better bp   Pain Assessment   Pain Assessment None - Denies Pain   Pain Level 0   Oxygen Therapy   O2 Device None (Room air)   Orientation   Overall Orientation Status WFL   Bed Mobility   Supine to Sit Contact guard assistance   Transfers   Sit to Stand Minimal Assistance   Stand to Sit Minimal Assistance   Bed to Chair Minimal assistance   Comment pt stood eob with fww prior to tfer  pt also performing bsc tfer with cga min  A and cues for safety, technique   Ambulation   Surface Level tile   Device Rolling Walker   Assistance Contact guard assistance;Minimal assistance   Quality of Gait short decreased foot clear steps   Gait Deviations Slow Emily;Decreased step length;Decreased step height   Distance 20'   Patient Goals    Patient Goals  get better   Short Term Goals   Time Frame for Short Term Goals 2 wks   Short Term Goal 1 supine to sit indep   Short Term Goal 2 sit to stand indep   Short Term Goal 3 amb. 200' with RW SBA   Short Term Goal 4 bed to chair SBA   Conditions Requiring Skilled Therapeutic Intervention   Assessment pt tolerating tx well  pt able to perform tfers eob, bsc stand with fww, amb in room increased dist with fww and pt placed in chair with sfaety and comfort measures  pt tolerating without c/o dizziness or nausea   Discharge Recommendations Continue to assess pending progress;24 hour supervision or assist;Therapy recommended at discharge   Activity Tolerance   Activity Tolerance Patient tolerated treatment  well   Physical Therapy Plan   Current Treatment Recommendations Strengthening;ROM;Balance training;Functional mobility training;Transfer training;Endurance training;Gait training;Patient/Caregiver education & training;Safety education & training;Equipment evaluation, education, & procurement;Positioning;Therapeutic activities   PT Plan of Care   Friday X   Safety Devices   Type of Devices Call light within reach;Gait belt;Left in chair  (dtr present)   PT Whiteboard Notes   Therapy Whiteboard RE 3/21 AMS, 2A         Electronically signed by Elena Escamilla PTA on 3/14/2025 at 1:04 PM

## 2025-03-14 NOTE — PROGRESS NOTES
Clinical Pharmacy Note    Warfarin consult follow-up    Recent Labs     03/14/25  0136   INR 2.20*     Recent Labs     03/12/25  0825 03/13/25  0648 03/13/25  1443 03/14/25  0136 03/14/25  0741   HGB 7.2* 5.8* 6.4* 7.2* 7.4*   HCT 21.8* 17.3* 19.2* 21.2* 21.9*    218  --  254  --      Current warfarin drug-drug interactions:    Amiodarone-Monitor patients extra closely for evidence of increased anticoagulant effects if amiodarone is initiated/dose increased, or decreased effects if amiodarone is discontinued/dose decreased, though due to the long half-life of amiodarone, any interaction may persist for several days/weeks following any dose decrease or discontinuation.   Carafate-Specifically, sucralfate may decrease the absorption of Vitamin K Antagonists.   Crestor-HMG-CoA Reductase Inhibitors (Statins) may enhance the anticoagulant effect of Vitamin K Antagonists.   Lexapro- Closely monitor patients for clinical and laboratory evidence of bleeding if these agents are combined.  Synthroid-Monitor for increased anticoagulant effects of vitamin K antagonists if a thyroid product is initiated/dose increased, or decreased effects if a thyroid product is discontinued/dose decreased.      New Drug Interactions:   Cefepime- Monitor for elevated INR and bleeding if a vitamin K antagonist is used in combination with cephalosporins.   Flagyl-Consider alternatives to concomitant therapy with metronidazole and vitamin K antagonists. If concomitant therapy cannot be avoided, consider reducing the dose of the vitamin K antagonist and monitor for increased INR/bleeding if metronidazole is initiated/dose increased.      Date INR Warfarin Dose   03/06/25 1.39 5 mg ordered but not taken   03/07/25   1.19 5 mg     03/08/25   1.24 5 mg     03/09/25   1.30 6 mg    03/10/25  1.49  6 mg   03/11/25  1.95  6 mg     03/12/25 2.17  6 mg    03/13/25 2.31 6 mg    03/14/25  2.20  6 mg              Notes:     GIve 6 mg Warfarin today

## 2025-03-14 NOTE — PROGRESS NOTES
Nutrition Assessment     Type and Reason for Visit: Reassess    Nutrition Recommendations/Plan:   Continue current POC     Malnutrition Assessment:  Malnutrition Status: At risk for malnutrition    Nutrition Assessment:  Patient's diet has been advanced to Easy to chew.  K+ restriction has been discontinued--K+ 3.2.  Per family diarrhea has improved.  New weight not available.  Glucose .  Accuchek     Estimated Daily Nutrient Needs:  Energy (kcal):  8573-5592 kcals (15-18 kcals/kg) Weight Used for Energy Requirements: Current     Protein (g):  74-123g Weight Used for Protein Requirements: Ideal        Fluid (ml/day):  9731-4068 ml Method Used for Fluid Requirements: 1 ml/kcal    Nutrition Related Findings:   HX- gastric bypass.  +1 BLE edema Wound Type: None    Current Nutrition Therapies:    ADULT DIET; Easy to Chew    Anthropometric Measures:  Height: 170.2 cm (5' 7.01\")  Current Body Wt: 108.9 kg (240 lb 1.3 oz)   BMI: 37.6        Nutrition Diagnosis:   Altered nutrition-related lab values related to renal dysfunction as evidenced by intake 0-25%, lab values    Nutrition Interventions:   Food and/or Nutrient Delivery: Continue Current Diet  Nutrition Education/Counseling: No recommendation at this time  Coordination of Nutrition Care: Continue to monitor while inpatient       Goals:  Goals: Meet at least 75% of estimated needs, PO intake 50% or greater  Type of Goal: Continue current goal  Previous Goal Met: Progressing toward Goal(s)    Nutrition Monitoring and Evaluation:   Behavioral-Environmental Outcomes: None Identified  Food/Nutrient Intake Outcomes: Diet Advancement/Tolerance  Physical Signs/Symptoms Outcomes: Biochemical Data, Nausea or Vomiting, Fluid Status or Edema, Weight, Skin    Discharge Planning:    Too soon to determine     Patsy Mcdaniels, MS, RD, LD  Contact: [phone]

## 2025-03-14 NOTE — PROGRESS NOTES
[facility]      [facility]    Patient:  Leny Stone  YOB: 1950  Date of Service: 3/14/2025  MRN: [id number]   Acct: [id number]   Primary Care Physician: Regina Curtis MD  Advance Directive: Full Code  Admit Date: 3/6/2025       Hospital Day: 8    Portions of this note have been copied forward, however, updated to reflect the most current clinical status of this patient.     CHIEF COMPLAINT altered mental status    SUBJECTIVE: feeling much better     CUMULATIVE HOSPITAL COURSE:   Patient is 74-year-old past medical history of lupus, hypertension, CKD stage III, CHF, hypothyroidism, anxiety, type 2 diabetes and history of A-fib on Coumadin.  Patient was brought in by family member after her son-in-law found patient with altered mental status.  Code stroke was called in the ED.  Family also reported patient is not always compliant with her medications.  She does not always wear her CPAP either.  She was found to have be lethargic and only give one-word answers.  ER workup CT head no acute intracranial hemorrhage or findings, CTA of the head neck no acute process CT brain perfusion no acute infarct or ischemia CT of the chest no acute findings abdomen and pelvis negative she does have a inferior vena cava [place] filter, bilateral lower lobe mild segmental atelectasis lab white count 7.2 hemoglobin 9.9 hematocrit 37.2 platelets 88 glucose 68 troponin 29 lactic 1.8 BUN 19 creatinine 1.6 GFR 33 patient admitted to the hospitalist service neurology was consulted.  MRI of the brain and the EEG.  MRI negative for acute findings .  Echo completed and cardiology consulted due to severely dilated left atrium.  D-dimer was also felt elevated and VQ scan was done which was negative.  EEG nonspecific no clear seizure activity.  Neurology felt like this event was a TIA and recommended continuing full anticoagulation.  Patient was due to have a Tonia today but cannot since she had a lung scan and  changes. 4. Partially empty sella.   ______________________________________ Electronically signed by: KATE GREGORY M.D. Date:     03/07/2025 Time:    15:42     Echo (TTE) complete (PRN contrast/bubble/strain/3D)  Result Date: 3/6/2025    Left Ventricle: Normal left ventricular systolic function with a visually estimated EF of 60 - 65%. Left ventricle size is normal. Mildly increased wall thickness. Normal wall motion.   Right Ventricle: Right ventricle size is normal. Normal systolic function. TAPSE is 2.4 cm.   Aortic Valve: Trileaflet valve. No cusp thickening. No regurgitation. No stenosis.   Mitral Valve: Valve structure is normal. MIld posterior leaflet prolapse with probably moderate regurgitation.   Left Atrium: Left atrium is severely dilated.   Interatrial Septum: Agitated saline study was negative with and without provocation.   Right Atrium: Right atrium size is normal.   IVC/SVC: IVC diameter is less than or equal to 21 mm and decreases greater than 50% during inspiration; therefore the estimated right atrial pressure is normal (~3 mmHg). IVC size is normal.     CT CHEST W CONTRAST  Result Date: 3/6/2025  EXAM: CHEST CT WITH INTRAVENOUS CONTRAST  HISTORY: Pain.  TECHNIQUE: CT acquisition of the chest from the thoracic inlet to the upper abdomen following IV contrast administration. 2-D coronal and sagittal reformatted images were obtained from the axial source images. CT Dose Reduction Techniques Performed: Yes. IV Contrast: Administered.  COMPARISON: Chest x-ray 02/08/2024.  FINDINGS: Evaluation is limited by breathing motion and due to the timing of the contrast bolus.  Lines, Tubes, Devices: None.  Lung Parenchyma and Airways: Central airways are patent without endobronchial lesion.  Low lung volumes.  Bibasilar subsegmental atelectasis or less likely infiltrates.  No suspicious pulmonary nodule. Calcified granuloma in the right upper lobe  Pleural Space: No pleural effusion or thickening. No

## 2025-03-15 LAB
ACANTHOCYTES BLD QL SMEAR: ABNORMAL
ANION GAP SERPL CALCULATED.3IONS-SCNC: 9 MMOL/L (ref 8–16)
ANISOCYTOSIS BLD QL SMEAR: ABNORMAL
BASOPHILS # BLD: 0.2 K/UL (ref 0–0.2)
BASOPHILS NFR BLD: 1 % (ref 0–1)
BUN SERPL-MCNC: 40 MG/DL (ref 8–23)
BURR CELLS BLD QL SMEAR: ABNORMAL
CALCIUM SERPL-MCNC: 8.2 MG/DL (ref 8.8–10.2)
CHLORIDE SERPL-SCNC: 104 MMOL/L (ref 98–107)
CO2 SERPL-SCNC: 23 MMOL/L (ref 22–29)
CREAT SERPL-MCNC: 2 MG/DL (ref 0.5–0.9)
DACRYOCYTES BLD QL SMEAR: ABNORMAL
EOSINOPHIL # BLD: 0 K/UL (ref 0–0.6)
EOSINOPHIL NFR BLD: 0 % (ref 0–5)
ERYTHROCYTE [DISTWIDTH] IN BLOOD BY AUTOMATED COUNT: 15.5 % (ref 11.5–14.5)
GLUCOSE BLD-MCNC: 100 MG/DL (ref 70–99)
GLUCOSE BLD-MCNC: 105 MG/DL (ref 70–99)
GLUCOSE BLD-MCNC: 114 MG/DL (ref 70–99)
GLUCOSE BLD-MCNC: 122 MG/DL (ref 70–99)
GLUCOSE SERPL-MCNC: 96 MG/DL (ref 70–99)
HCT VFR BLD AUTO: 22.2 % (ref 37–47)
HGB BLD-MCNC: 7.5 G/DL (ref 12–16)
IMM GRANULOCYTES # BLD: 4.4 K/UL
INR PPP: 2.09 (ref 0.88–1.18)
LYMPHOCYTES # BLD: 2.8 K/UL (ref 1.1–4.5)
LYMPHOCYTES NFR BLD: 13 % (ref 20–40)
MAGNESIUM SERPL-MCNC: 1.7 MG/DL (ref 1.6–2.4)
MCH RBC QN AUTO: 30.6 PG (ref 27–31)
MCHC RBC AUTO-ENTMCNC: 33.8 G/DL (ref 33–37)
MCV RBC AUTO: 90.6 FL (ref 81–99)
METAMYELOCYTES NFR BLD MANUAL: 7 %
MICROCYTES BLD QL SMEAR: ABNORMAL
MONOCYTES # BLD: 0.6 K/UL (ref 0–0.9)
MONOCYTES NFR BLD: 3 % (ref 0–10)
NEUTROPHILS # BLD: 15.5 K/UL (ref 1.5–7.5)
NEUTS BAND NFR BLD MANUAL: 13 % (ref 0–5)
NEUTS SEG NFR BLD: 58 % (ref 50–65)
NEUTS VAC BLD QL SMEAR: ABNORMAL
NRBC BLD-RTO: 1 /100 WBC
OVALOCYTES BLD QL SMEAR: ABNORMAL
PERFORMED ON: ABNORMAL
PLASMA CELLS NFR BLD: 4 %
PLATELET # BLD AUTO: 304 K/UL (ref 130–400)
PLATELET SLIDE REVIEW: ADEQUATE
PMV BLD AUTO: 11.2 FL (ref 9.4–12.3)
POIKILOCYTOSIS BLD QL SMEAR: ABNORMAL
POLYCHROMASIA BLD QL SMEAR: ABNORMAL
POTASSIUM SERPL-SCNC: 3.5 MMOL/L (ref 3.5–5)
PROTHROMBIN TIME: 23 SEC (ref 12–14.6)
RBC # BLD AUTO: 2.45 M/UL (ref 4.2–5.4)
SCHISTOCYTES BLD QL SMEAR: ABNORMAL
SODIUM SERPL-SCNC: 136 MMOL/L (ref 136–145)
SPHEROCYTES BLD QL SMEAR: ABNORMAL
TOXIC GRANULATION: ABNORMAL
VARIANT LYMPHS NFR BLD: 1 % (ref 0–8)
WBC # BLD AUTO: 19.9 K/UL (ref 4.8–10.8)

## 2025-03-15 PROCEDURE — 6360000002 HC RX W HCPCS: Performed by: NURSE PRACTITIONER

## 2025-03-15 PROCEDURE — 85610 PROTHROMBIN TIME: CPT

## 2025-03-15 PROCEDURE — 6370000000 HC RX 637 (ALT 250 FOR IP): Performed by: NURSE PRACTITIONER

## 2025-03-15 PROCEDURE — 82962 GLUCOSE BLOOD TEST: CPT

## 2025-03-15 PROCEDURE — 94150 VITAL CAPACITY TEST: CPT

## 2025-03-15 PROCEDURE — 6370000000 HC RX 637 (ALT 250 FOR IP)

## 2025-03-15 PROCEDURE — 83735 ASSAY OF MAGNESIUM: CPT

## 2025-03-15 PROCEDURE — 6370000000 HC RX 637 (ALT 250 FOR IP): Performed by: INTERNAL MEDICINE

## 2025-03-15 PROCEDURE — 1200000000 HC SEMI PRIVATE

## 2025-03-15 PROCEDURE — 80048 BASIC METABOLIC PNL TOTAL CA: CPT

## 2025-03-15 PROCEDURE — 2580000003 HC RX 258: Performed by: INTERNAL MEDICINE

## 2025-03-15 PROCEDURE — 2500000003 HC RX 250 WO HCPCS

## 2025-03-15 PROCEDURE — 94760 N-INVAS EAR/PLS OXIMETRY 1: CPT

## 2025-03-15 PROCEDURE — 85025 COMPLETE CBC W/AUTO DIFF WBC: CPT

## 2025-03-15 PROCEDURE — 94640 AIRWAY INHALATION TREATMENT: CPT

## 2025-03-15 RX ADMIN — ESCITALOPRAM OXALATE 20 MG: 10 TABLET ORAL at 20:40

## 2025-03-15 RX ADMIN — DICLOFENAC SODIUM 2 G: 10 GEL TOPICAL at 20:40

## 2025-03-15 RX ADMIN — SUCRALFATE 1 G: 1 TABLET ORAL at 06:08

## 2025-03-15 RX ADMIN — MIDODRINE HYDROCHLORIDE 5 MG: 5 TABLET ORAL at 10:54

## 2025-03-15 RX ADMIN — METRONIDAZOLE 500 MG: 5 INJECTION, SOLUTION INTRAVENOUS at 06:07

## 2025-03-15 RX ADMIN — SODIUM CHLORIDE, PRESERVATIVE FREE 10 ML: 5 INJECTION INTRAVENOUS at 20:40

## 2025-03-15 RX ADMIN — AMIODARONE HYDROCHLORIDE 100 MG: 200 TABLET ORAL at 06:08

## 2025-03-15 RX ADMIN — SUCRALFATE 1 G: 1 TABLET ORAL at 16:35

## 2025-03-15 RX ADMIN — SODIUM CHLORIDE: 9 INJECTION, SOLUTION INTRAVENOUS at 07:55

## 2025-03-15 RX ADMIN — MIDODRINE HYDROCHLORIDE 5 MG: 5 TABLET ORAL at 06:08

## 2025-03-15 RX ADMIN — ROSUVASTATIN 5 MG: 10 TABLET, FILM COATED ORAL at 06:08

## 2025-03-15 RX ADMIN — LEVOTHYROXINE SODIUM 100 MCG: 100 TABLET ORAL at 06:08

## 2025-03-15 RX ADMIN — PANTOPRAZOLE SODIUM 40 MG: 40 TABLET, DELAYED RELEASE ORAL at 06:08

## 2025-03-15 RX ADMIN — PANTOPRAZOLE SODIUM 40 MG: 40 TABLET, DELAYED RELEASE ORAL at 16:35

## 2025-03-15 RX ADMIN — ASPIRIN 81 MG: 81 TABLET, CHEWABLE ORAL at 06:08

## 2025-03-15 RX ADMIN — SUCRALFATE 1 G: 1 TABLET ORAL at 20:36

## 2025-03-15 RX ADMIN — CALCITRIOL CAPSULES 0.25 MCG 0.5 MCG: 0.25 CAPSULE ORAL at 06:08

## 2025-03-15 RX ADMIN — SUCRALFATE 1 G: 1 TABLET ORAL at 10:54

## 2025-03-15 RX ADMIN — METRONIDAZOLE 500 MG: 5 INJECTION, SOLUTION INTRAVENOUS at 00:35

## 2025-03-15 RX ADMIN — MIDODRINE HYDROCHLORIDE 5 MG: 5 TABLET ORAL at 16:35

## 2025-03-15 RX ADMIN — TIOTROPIUM BROMIDE AND OLODATEROL 2 PUFF: 3.124; 2.736 SPRAY, METERED RESPIRATORY (INHALATION) at 11:06

## 2025-03-15 RX ADMIN — DICLOFENAC SODIUM 2 G: 10 GEL TOPICAL at 06:07

## 2025-03-15 RX ADMIN — WARFARIN SODIUM 6 MG: 5 TABLET ORAL at 16:35

## 2025-03-15 ASSESSMENT — PAIN SCALES - GENERAL
PAINLEVEL_OUTOF10: 0
PAINLEVEL_OUTOF10: 0

## 2025-03-15 NOTE — PROGRESS NOTES
Clinical Pharmacy Note    Warfarin consult follow-up    Recent Labs     03/15/25  0120   INR 2.09*     Recent Labs     03/13/25  0648 03/13/25  1443 03/14/25  0136 03/14/25  0741 03/14/25  1509 03/15/25  0120   HGB 5.8*   < > 7.2* 7.4* 7.6* 7.5*   HCT 17.3*   < > 21.2* 21.9* 22.5* 22.2*     --  254  --   --  304    < > = values in this interval not displayed.       Current warfarin drug-drug interactions:    Amiodarone-Monitor patients extra closely for evidence of increased anticoagulant effects if amiodarone is initiated/dose increased, or decreased effects if amiodarone is discontinued/dose decreased, though due to the long half-life of amiodarone, any interaction may persist for several days/weeks following any dose decrease or discontinuation.   Carafate-Specifically, sucralfate may decrease the absorption of Vitamin K Antagonists.   Crestor-HMG-CoA Reductase Inhibitors (Statins) may enhance the anticoagulant effect of Vitamin K Antagonists.   Lexapro- Closely monitor patients for clinical and laboratory evidence of bleeding if these agents are combined.  Synthroid-Monitor for increased anticoagulant effects of vitamin K antagonists if a thyroid product is initiated/dose increased, or decreased effects if a thyroid product is discontinued/dose decreased.    Discontinued drug-drug interactions:   Cefepime- Monitor for elevated INR and bleeding if a vitamin K antagonist is used in combination with cephalosporins.   Flagyl-Consider alternatives to concomitant therapy with metronidazole and vitamin K antagonists. If concomitant therapy cannot be avoided, consider reducing the dose of the vitamin K antagonist and monitor for increased INR/bleeding if metronidazole is initiated/dose increased.     Date INR Warfarin Dose   03/06/25 1.39 5 mg ordered but not taken   03/07/25   1.19 5 mg     03/08/25   1.24 5 mg     03/09/25   1.30 6 mg    03/10/25  1.49  6 mg   03/11/25  1.95  6 mg     03/12/25 2.17  6 mg

## 2025-03-15 NOTE — PLAN OF CARE
Problem: Chronic Conditions and Co-morbidities  Goal: Patient's chronic conditions and co-morbidity symptoms are monitored and maintained or improved  Outcome: Progressing     Problem: Discharge Planning  Goal: Discharge to home or other facility with appropriate resources  Outcome: Progressing     Problem: Skin/Tissue Integrity  Goal: Skin integrity remains intact  Description: 1.  Monitor for areas of redness and/or skin breakdown  2.  Assess vascular access sites hourly  3.  Every 4-6 hours minimum:  Change oxygen saturation probe site  4.  Every 4-6 hours:  If on nasal continuous positive airway pressure, respiratory therapy assess nares and determine need for appliance change or resting period  Outcome: Progressing  Flowsheets (Taken 3/15/2025 0208)  Skin Integrity Remains Intact: Monitor for areas of redness and/or skin breakdown     Problem: Safety - Adult  Goal: Free from fall injury  Outcome: Progressing  Flowsheets (Taken 3/15/2025 0208)  Free From Fall Injury: Instruct family/caregiver on patient safety     Problem: Confusion  Goal: Confusion, delirium, dementia, or psychosis is improved or at baseline  Description: INTERVENTIONS:  1. Assess for possible contributors to thought disturbance, including medications, impaired vision or hearing, underlying metabolic abnormalities, dehydration, psychiatric diagnoses, and notify attending LIP  2. [place] high risk fall precautions, as indicated  3. Provide frequent short contacts to provide reality reorientation, refocusing and direction  4. Decrease environmental stimuli, including noise as appropriate  5. Monitor and intervene to maintain adequate nutrition, hydration, elimination, sleep and activity  6. If unable to ensure safety without constant attention obtain sitter and review sitter guidelines with assigned personnel  7. Initiate Psychosocial CNS and Spiritual Care consult, as indicated  Outcome: Progressing  Flowsheets (Taken 3/14/2025 2129)  Effect

## 2025-03-15 NOTE — PROGRESS NOTES
[facility]      Progress Note    Patient:  Leny Stone  YOB: 1950  Date of Service: 3/15/2025  MRN: [id number]   Acct: [id number]   Primary Care Physician: Regina Curtis MD  Advance Directive: Full Code  Admit Date: 3/6/2025       Hospital Day: 9    Portions of this note have been copied forward, however, updated to reflect the most current clinical status of this patient.     CHIEF COMPLAINT altered mental status    SUBJECTIVE: Has a hard hematoma to left abdomen.  No complaint of pain.      CUMULATIVE HOSPITAL COURSE:   Patient is 74-year-old past medical history of lupus, hypertension, CKD stage III, CHF, hypothyroidism, anxiety, type 2 diabetes and history of A-fib on Coumadin.  Patient was brought in by family member after her son-in-law found patient with altered mental status.  Code stroke was called in the ED.  Family also reported patient is not always compliant with her medications.  She does not always wear her CPAP either.  She was found to have be lethargic and only give one-word answers.  ER workup CT head no acute intracranial hemorrhage or findings, CTA of the head neck no acute process CT brain perfusion no acute infarct or ischemia CT of the chest no acute findings abdomen and pelvis negative she does have a inferior vena cava [place] filter, bilateral lower lobe mild segmental atelectasis lab white count 7.2 hemoglobin 9.9 hematocrit 37.2 platelets 88 glucose 68 troponin 29 lactic 1.8 BUN 19 creatinine 1.6 GFR 33 patient admitted to the hospitalist service neurology was consulted.  MRI of the brain and the EEG.  MRI negative for acute findings .  Echo completed and cardiology consulted due to severely dilated left atrium.  D-dimer was also felt elevated and VQ scan was done which was negative.  EEG nonspecific no clear seizure activity.  Neurology felt like this event was a TIA and recommended continuing full anticoagulation.  Patient was due to have a Tonia today but  cannot since she had a lung scan and yesterday.  Tonia attempted today but pt could not tolerate.  Having diarrhea frequently when back in room.  Conferred with family at bedside.  Unhappy about care overnight.  CBC resulted during visit with increase in WBC.  CXR,  procal, bcx 2, c diff, gi panel, cath urine , bicarb drip in progress.  Nephrology consulted this a.m. for increase in creatinine.  B/p soft.  Metoprolol held.  Patient's lab continuing to worsen.  She has been covered with broad coverage antibiotic for the elevated Pro-Adama and abnormal chest x-ray.  Stool panel did not reveal any cause of diarrhea.  Nursing instructed to treat diarrhea with as needed Imodium.  Will give her another fluid bolus along with increasing her sodium bicarb drip rate.  Indwelling Navas placed to monitor for an accurate intake and output.    Patient reports being hungry today.  Her renal  labs are improved.  She is requiring a transfusion her hemoglobin dropped to 5.9.  No obvious bleeding.  Most likely secondary to her renal dysfunction.  Will monitor closely.    Labs improved she is going to walk today.  Her diarrhea has resolved.  Will remove her Navas today.   IV abx discontinued .  Will moniter off..  Renal function still improving .  Will order aqua k hot pack for abd hematoma.    Objective:   VITALS:  /80   Pulse 66   Temp 99 °F (37.2 °C)   Resp 18   Ht 1.702 m (5' 7.01\")   Wt 108.9 kg (240 lb)   SpO2 98%   BMI 37.58 kg/m²   24HR INTAKE/OUTPUT:    Intake/Output Summary (Last 24 hours) at 3/15/2025 1646  Last data filed at 3/15/2025 1612  Gross per 24 hour   Intake 1623.51 ml   Output 1800 ml   Net -176.49 ml       Physical Exam  Vitals and nursing note reviewed.   Constitutional:       Appearance: She is obese.   HENT:      Head: Normocephalic.      Nose: Nose normal.      Mouth/Throat:      Mouth: Mucous membranes are moist.   Eyes:      Extraocular Movements: Extraocular movements intact.   Cardiovascular:

## 2025-03-15 NOTE — PROGRESS NOTES
Renal Progress Note    Thank you to requesting provider: Dr Minaya, for asking us to see Leny Stone    Reason for consultation:  MIREYA, CKD    Chief Complaint:  Change in mental status.     History of Presenting Illness      Patient is a 74-year-old -American woman with a past medical history of type 2 diabetes, CHF, DVT Lupus anticoagulant, obesity status post gastric bypass, COPD, hypertension, atrial fibrillation and chronic kidney disease stage 3b followed by Dr. Connor at the renal office.  Patient was admitted on March 6 with acute change to her mental status.  She was initially very hypertensive.  A CT angiogram of the head and neck was obtained and she was found there was no high-grade stenosis in her brain circulation.  Her serum creatinine was 1.6 on admission with GFR at 33.  Patient was initially cell to have a TIA/CVA.  She was seen and followed by neurology.  She was also seen by cardiology.  She has received diuretics.  And over the last day, patient was having diarrhea.  Her renal function has progressively worsened with serum creatinine up to 2.5 on March 10 and 2 4 on March 11.  Renal service is consulted to manage her MIREYA.  Nurse has noticed drop tin her urine output.  Patient is a bit sleepy and was a poor historian and not much verbal.   Patient 's urine output has improved.   Her diarrhea has resolved. She is now complaining of some tenderness at the LUQ abdominal area where she ws having her Lovenox shots.      Past Medical/Surgical History      Active Ambulatory Problems     Diagnosis Date Noted    Gastroesophageal reflux disease 05/02/2014    History of gastric bypass 05/02/2014    Family history of colon cancer 05/02/2014    Fibromyalgia 03/14/2014    Encounter for current long-term use of anticoagulants 02/22/2018    Primary osteoarthritis of right knee 03/22/2018    Arthritis of knee 03/26/2018    Hypertensive disorder 09/22/2016    Hyperglycemia 03/26/2018    Complex sleep apnea

## 2025-03-16 LAB
ACANTHOCYTES BLD QL SMEAR: ABNORMAL
ANION GAP SERPL CALCULATED.3IONS-SCNC: 8 MMOL/L (ref 8–16)
BACTERIA BLD CULT ORG #2: NORMAL
BACTERIA BLD CULT: NORMAL
BASOPHILS # BLD: 0.2 K/UL (ref 0–0.2)
BASOPHILS NFR BLD: 1 % (ref 0–1)
BUN SERPL-MCNC: 26 MG/DL (ref 8–23)
BURR CELLS BLD QL SMEAR: ABNORMAL
CALCIUM SERPL-MCNC: 8.7 MG/DL (ref 8.8–10.2)
CHLORIDE SERPL-SCNC: 107 MMOL/L (ref 98–107)
CO2 SERPL-SCNC: 24 MMOL/L (ref 22–29)
CREAT SERPL-MCNC: 1.4 MG/DL (ref 0.5–0.9)
DACRYOCYTES BLD QL SMEAR: ABNORMAL
EOSINOPHIL # BLD: 0 K/UL (ref 0–0.6)
EOSINOPHIL NFR BLD: 0 % (ref 0–5)
ERYTHROCYTE [DISTWIDTH] IN BLOOD BY AUTOMATED COUNT: 15.7 % (ref 11.5–14.5)
GLUCOSE BLD-MCNC: 107 MG/DL (ref 70–99)
GLUCOSE BLD-MCNC: 131 MG/DL (ref 70–99)
GLUCOSE BLD-MCNC: 99 MG/DL (ref 70–99)
GLUCOSE SERPL-MCNC: 91 MG/DL (ref 70–99)
HCT VFR BLD AUTO: 23.4 % (ref 37–47)
HGB BLD-MCNC: 7.6 G/DL (ref 12–16)
IMM GRANULOCYTES # BLD: 4.1 K/UL
INR PPP: 2.16 (ref 0.88–1.18)
LYMPHOCYTES # BLD: 1.5 K/UL (ref 1.1–4.5)
LYMPHOCYTES NFR BLD: 7 % (ref 20–40)
MAGNESIUM SERPL-MCNC: 1.5 MG/DL (ref 1.6–2.4)
MCH RBC QN AUTO: 29.8 PG (ref 27–31)
MCHC RBC AUTO-ENTMCNC: 32.5 G/DL (ref 33–37)
MCV RBC AUTO: 91.8 FL (ref 81–99)
METAMYELOCYTES NFR BLD MANUAL: 11 %
MICROCYTES BLD QL SMEAR: ABNORMAL
MONOCYTES # BLD: 0.9 K/UL (ref 0–0.9)
MONOCYTES NFR BLD: 5 % (ref 0–10)
MYELOCYTES NFR BLD MANUAL: 3 %
NEUTROPHILS # BLD: 15.9 K/UL (ref 1.5–7.5)
NEUTS BAND NFR BLD MANUAL: 6 % (ref 0–5)
NEUTS SEG NFR BLD: 66 % (ref 50–65)
NEUTS VAC BLD QL SMEAR: ABNORMAL
OVALOCYTES BLD QL SMEAR: ABNORMAL
PERFORMED ON: ABNORMAL
PERFORMED ON: ABNORMAL
PERFORMED ON: NORMAL
PLATELET # BLD AUTO: 341 K/UL (ref 130–400)
PLATELET SLIDE REVIEW: ADEQUATE
PMV BLD AUTO: 10.8 FL (ref 9.4–12.3)
POIKILOCYTOSIS BLD QL SMEAR: ABNORMAL
POLYCHROMASIA BLD QL SMEAR: ABNORMAL
POTASSIUM SERPL-SCNC: 3.5 MMOL/L (ref 3.5–5)
POTASSIUM SERPL-SCNC: 3.5 MMOL/L (ref 3.5–5.1)
PROTHROMBIN TIME: 23.5 SEC (ref 12–14.6)
RBC # BLD AUTO: 2.55 M/UL (ref 4.2–5.4)
SCHISTOCYTES BLD QL SMEAR: ABNORMAL
SODIUM SERPL-SCNC: 139 MMOL/L (ref 136–145)
TARGETS BLD QL SMEAR: ABNORMAL
TOXIC GRANULATION: ABNORMAL
VARIANT LYMPHS NFR BLD: 1 % (ref 0–8)
WBC # BLD AUTO: 18.5 K/UL (ref 4.8–10.8)

## 2025-03-16 PROCEDURE — 6370000000 HC RX 637 (ALT 250 FOR IP): Performed by: NURSE PRACTITIONER

## 2025-03-16 PROCEDURE — 94150 VITAL CAPACITY TEST: CPT

## 2025-03-16 PROCEDURE — 83735 ASSAY OF MAGNESIUM: CPT

## 2025-03-16 PROCEDURE — 80048 BASIC METABOLIC PNL TOTAL CA: CPT

## 2025-03-16 PROCEDURE — 6370000000 HC RX 637 (ALT 250 FOR IP)

## 2025-03-16 PROCEDURE — 82962 GLUCOSE BLOOD TEST: CPT

## 2025-03-16 PROCEDURE — 1200000000 HC SEMI PRIVATE

## 2025-03-16 PROCEDURE — 94640 AIRWAY INHALATION TREATMENT: CPT

## 2025-03-16 PROCEDURE — 2500000003 HC RX 250 WO HCPCS

## 2025-03-16 PROCEDURE — 85025 COMPLETE CBC W/AUTO DIFF WBC: CPT

## 2025-03-16 PROCEDURE — 85610 PROTHROMBIN TIME: CPT

## 2025-03-16 PROCEDURE — 6370000000 HC RX 637 (ALT 250 FOR IP): Performed by: INTERNAL MEDICINE

## 2025-03-16 RX ORDER — LANOLIN ALCOHOL/MO/W.PET/CERES
400 CREAM (GRAM) TOPICAL DAILY
Status: DISCONTINUED | OUTPATIENT
Start: 2025-03-16 | End: 2025-03-18 | Stop reason: HOSPADM

## 2025-03-16 RX ADMIN — Medication 400 MG: at 16:03

## 2025-03-16 RX ADMIN — SUCRALFATE 1 G: 1 TABLET ORAL at 21:13

## 2025-03-16 RX ADMIN — MIDODRINE HYDROCHLORIDE 5 MG: 5 TABLET ORAL at 16:03

## 2025-03-16 RX ADMIN — SODIUM CHLORIDE, PRESERVATIVE FREE 10 ML: 5 INJECTION INTRAVENOUS at 08:20

## 2025-03-16 RX ADMIN — WARFARIN SODIUM 6 MG: 5 TABLET ORAL at 17:26

## 2025-03-16 RX ADMIN — CALCITRIOL CAPSULES 0.25 MCG 0.5 MCG: 0.25 CAPSULE ORAL at 08:21

## 2025-03-16 RX ADMIN — ROSUVASTATIN 5 MG: 10 TABLET, FILM COATED ORAL at 08:20

## 2025-03-16 RX ADMIN — ESCITALOPRAM OXALATE 20 MG: 10 TABLET ORAL at 21:12

## 2025-03-16 RX ADMIN — MIDODRINE HYDROCHLORIDE 5 MG: 5 TABLET ORAL at 08:20

## 2025-03-16 RX ADMIN — SUCRALFATE 1 G: 1 TABLET ORAL at 11:01

## 2025-03-16 RX ADMIN — MIDODRINE HYDROCHLORIDE 5 MG: 5 TABLET ORAL at 11:01

## 2025-03-16 RX ADMIN — DICLOFENAC SODIUM 2 G: 10 GEL TOPICAL at 21:13

## 2025-03-16 RX ADMIN — DICLOFENAC SODIUM 2 G: 10 GEL TOPICAL at 08:20

## 2025-03-16 RX ADMIN — PANTOPRAZOLE SODIUM 40 MG: 40 TABLET, DELAYED RELEASE ORAL at 05:46

## 2025-03-16 RX ADMIN — PANTOPRAZOLE SODIUM 40 MG: 40 TABLET, DELAYED RELEASE ORAL at 16:03

## 2025-03-16 RX ADMIN — LEVOTHYROXINE SODIUM 100 MCG: 100 TABLET ORAL at 05:46

## 2025-03-16 RX ADMIN — SUCRALFATE 1 G: 1 TABLET ORAL at 16:03

## 2025-03-16 RX ADMIN — AMIODARONE HYDROCHLORIDE 100 MG: 200 TABLET ORAL at 08:21

## 2025-03-16 RX ADMIN — ASPIRIN 81 MG: 81 TABLET, CHEWABLE ORAL at 08:21

## 2025-03-16 RX ADMIN — SUCRALFATE 1 G: 1 TABLET ORAL at 05:46

## 2025-03-16 RX ADMIN — SODIUM CHLORIDE, PRESERVATIVE FREE 10 ML: 5 INJECTION INTRAVENOUS at 21:22

## 2025-03-16 RX ADMIN — TIOTROPIUM BROMIDE AND OLODATEROL 2 PUFF: 3.124; 2.736 SPRAY, METERED RESPIRATORY (INHALATION) at 07:50

## 2025-03-16 ASSESSMENT — PAIN SCALES - GENERAL: PAINLEVEL_OUTOF10: 0

## 2025-03-16 NOTE — PLAN OF CARE
Problem: Chronic Conditions and Co-morbidities  Goal: Patient's chronic conditions and co-morbidity symptoms are monitored and maintained or improved  Outcome: Progressing  Flowsheets (Taken 3/15/2025 2235)  Care Plan - Patient's Chronic Conditions and Co-Morbidity Symptoms are Monitored and Maintained or Improved: Monitor and assess patient's chronic conditions and comorbid symptoms for stability, deterioration, or improvement     Problem: Discharge Planning  Goal: Discharge to home or other facility with appropriate resources  Outcome: Progressing  Flowsheets (Taken 3/15/2025 2235)  Discharge to home or other facility with appropriate resources: Identify barriers to discharge with patient and caregiver     Problem: Skin/Tissue Integrity  Goal: Skin integrity remains intact  Description: 1.  Monitor for areas of redness and/or skin breakdown  2.  Assess vascular access sites hourly  3.  Every 4-6 hours minimum:  Change oxygen saturation probe site  4.  Every 4-6 hours:  If on nasal continuous positive airway pressure, respiratory therapy assess nares and determine need for appliance change or resting period  Outcome: Progressing  Flowsheets  Taken 3/16/2025 0151  Skin Integrity Remains Intact: Monitor for areas of redness and/or skin breakdown  Taken 3/15/2025 2235  Skin Integrity Remains Intact: Monitor for areas of redness and/or skin breakdown     Problem: Safety - Adult  Goal: Free from fall injury  Outcome: Progressing  Flowsheets (Taken 3/16/2025 0151)  Free From Fall Injury: Instruct family/caregiver on patient safety     Problem: Confusion  Goal: Confusion, delirium, dementia, or psychosis is improved or at baseline  Description: INTERVENTIONS:  1. Assess for possible contributors to thought disturbance, including medications, impaired vision or hearing, underlying metabolic abnormalities, dehydration, psychiatric diagnoses, and notify attending LIP  2. [place] high risk fall precautions, as

## 2025-03-16 NOTE — PLAN OF CARE
Problem: Chronic Conditions and Co-morbidities  Goal: Patient's chronic conditions and co-morbidity symptoms are monitored and maintained or improved  3/16/2025 0826 by Arminda Richter RN  Outcome: Progressing  3/16/2025 0826 by Arminda Richter RN  Outcome: Progressing  3/16/2025 0152 by Temi Britt LPN  Outcome: Progressing  Flowsheets (Taken 3/15/2025 2235)  Care Plan - Patient's Chronic Conditions and Co-Morbidity Symptoms are Monitored and Maintained or Improved: Monitor and assess patient's chronic conditions and comorbid symptoms for stability, deterioration, or improvement     Problem: Discharge Planning  Goal: Discharge to home or other facility with appropriate resources  3/16/2025 0826 by Arminda Richter RN  Outcome: Progressing  3/16/2025 0826 by Arminda Richter RN  Outcome: Progressing  3/16/2025 0152 by Temi Britt LPN  Outcome: Progressing  Flowsheets (Taken 3/15/2025 2235)  Discharge to home or other facility with appropriate resources: Identify barriers to discharge with patient and caregiver     Problem: Skin/Tissue Integrity  Goal: Skin integrity remains intact  Description: 1.  Monitor for areas of redness and/or skin breakdown  2.  Assess vascular access sites hourly  3.  Every 4-6 hours minimum:  Change oxygen saturation probe site  4.  Every 4-6 hours:  If on nasal continuous positive airway pressure, respiratory therapy assess nares and determine need for appliance change or resting period  3/16/2025 0826 by Arminda Richter RN  Outcome: Progressing  3/16/2025 0826 by Arminda Richter RN  Outcome: Progressing  3/16/2025 0152 by Temi Britt LPN  Outcome: Progressing  Flowsheets  Taken 3/16/2025 0151  Skin Integrity Remains Intact: Monitor for areas of redness and/or skin breakdown  Taken 3/15/2025 2235  Skin Integrity Remains Intact: Monitor for areas of redness and/or skin breakdown     Problem: Safety - Adult  Goal: Free from fall

## 2025-03-16 NOTE — PROGRESS NOTES
Clinical Pharmacy Note    Warfarin consult follow-up    Recent Labs     03/16/25  0322   INR 2.16*     Recent Labs     03/14/25  0136 03/14/25  0741 03/14/25  1509 03/15/25  0120 03/16/25  0322   HGB 7.2*   < > 7.6* 7.5* 7.6*   HCT 21.2*   < > 22.5* 22.2* 23.4*     --   --  304 341    < > = values in this interval not displayed.       Current warfarin drug-drug interactions:    Amiodarone-Monitor patients extra closely for evidence of increased anticoagulant effects if amiodarone is initiated/dose increased, or decreased effects if amiodarone is discontinued/dose decreased, though due to the long half-life of amiodarone, any interaction may persist for several days/weeks following any dose decrease or discontinuation.   Carafate-Specifically, sucralfate may decrease the absorption of Vitamin K Antagonists.   Crestor-HMG-CoA Reductase Inhibitors (Statins) may enhance the anticoagulant effect of Vitamin K Antagonists.   Lexapro- Closely monitor patients for clinical and laboratory evidence of bleeding if these agents are combined.  Synthroid-Monitor for increased anticoagulant effects of vitamin K antagonists if a thyroid product is initiated/dose increased, or decreased effects if a thyroid product is discontinued/dose decreased.    Date INR Warfarin Dose   03/06/25 1.39 5 mg ordered but not taken   03/07/25   1.19 5 mg     03/08/25   1.24 5 mg     03/09/25   1.30 6 mg    03/10/25  1.49  6 mg   03/11/25  1.95  6 mg     03/12/25 2.17  6 mg    03/13/25 2.31 6 mg    03/14/25  2.20  6 mg    03/15/25  2.09 6 mg    03/16/25 2.16 6 mg       Notes:        Give Warfarin 6 mg x 1 dose today.               Daily PT/INR until stable within therapeutic range.     Electronically signed by Arminda Tate RPH on 3/16/2025 at 3:36 PM

## 2025-03-16 NOTE — PROGRESS NOTES
Renal Progress Note    Thank you to requesting provider: Dr Minaya, for asking us to see Leny Stone    Reason for consultation:  MIREYA, CKD    Chief Complaint:  Change in mental status.     History of Presenting Illness      Patient is a 74-year-old -American woman with a past medical history of type 2 diabetes, CHF, DVT Lupus anticoagulant, obesity status post gastric bypass, COPD, hypertension, atrial fibrillation and chronic kidney disease stage 3b followed by Dr. Connor at the renal office.  Patient was admitted on March 6 with acute change to her mental status.  She was initially very hypertensive.  A CT angiogram of the head and neck was obtained and she was found there was no high-grade stenosis in her brain circulation.  Her serum creatinine was 1.6 on admission with GFR at 33.  Patient was initially cell to have a TIA/CVA.  She was seen and followed by neurology.  She was also seen by cardiology.  She has received diuretics.  And over the last day, patient was having diarrhea.  Her renal function has progressively worsened with serum creatinine up to 2.5 on March 10 and 2 4 on March 11.  Renal service is consulted to manage her MIREYA.  Nurse has noticed drop tin her urine output.  Patient is a bit sleepy and was a poor historian and not much verbal.   Patient 's urine output has improved.   Her diarrhea has resolved. She has good appetite.     Past Medical/Surgical History      Active Ambulatory Problems     Diagnosis Date Noted    Gastroesophageal reflux disease 05/02/2014    History of gastric bypass 05/02/2014    Family history of colon cancer 05/02/2014    Fibromyalgia 03/14/2014    Encounter for current long-term use of anticoagulants 02/22/2018    Primary osteoarthritis of right knee 03/22/2018    Arthritis of knee 03/26/2018    Hypertensive disorder 09/22/2016    Hyperglycemia 03/26/2018    Complex sleep apnea syndrome 03/26/2018    Iron deficiency anemia 03/26/2018    Restrictive airway disease

## 2025-03-16 NOTE — PROGRESS NOTES
endobronchial lesion.  Low lung volumes.  Bibasilar subsegmental atelectasis or less likely infiltrates.  No suspicious pulmonary nodule. Calcified granuloma in the right upper lobe  Pleural Space: No pleural effusion or thickening. No pneumothorax.  Mediastinum and Caryn: Thyroid gland is normal.  No lymphadenopathy. Sub centimeter mediastinal nodes.  Heart and Pericardium: Heart is not enlarged.  No significant valvular calcifications. No significant coronary artery calcifications, however exam is not optimized for evaluation.  No pericardial effusion or thickening.  Vessels: Aorta is tortuous and normal in caliber with mild atherosclerotic calcifications.  Main pulmonary artery is mildly enlarged measuring 3.2 cm.  Bones: No fracture, lytic lesion, or blastic lesion. Kyphosis.  Spondylosis. Mild scoliosis.  Soft Tissues: Chest wall soft tissues are unremarkable.  Upper Abdomen:  Cholecystectomy. Postsurgical change in the left upper quadrant.       1.  No acute findings. Chronic changes as described.  All CT scans are performed using dose optimization techniques as appropriate to the performed exam and include at least one of the following: Automated exposure control, adjustment of the mA and/or kV according to size, and the use of iterative reconstruction technique.  ______________________________________ Electronically signed by: YOAN PAVON M.D. Date:     03/06/2025 Time:    13:49     CT ABDOMEN PELVIS W IV CONTRAST Additional Contrast? None  Result Date: 3/6/2025  EXAM: CT ABDOMEN AND PELVIS WITH CONTRAST  HISTORY:   Abdominal and pelvic pain.  TECHNIQUE: CT acquisition of the abdomen and pelvis from the lower thorax through the pelvis following IV contrast administration. 2-D coronal and sagittal reformatted images were obtained from the axial source images. IV Contrast: 50 mL of Omnipaque 350 administered.  Oral Contrast: None. CT Dose Reduction Techniques Performed: Yes.  COMPARISON: None.  FINDINGS:  consistent with age related changes. There is no hydrocephalus.  Paranasal sinuses and mastoid air cells: Visualized portions of paranasal sinuses are clear.  Mastoid air cells are clear.  Orbits: Normal visualized portions.  Sella/Skull Base: Normal.  Bones: Calvarium is normal.  Scalp/Soft Tissues: Scalp and visualized soft tissues are normal.      1. No intracranial hemorrhage. 2. No acute findings. 3.  Limited examination.  Findings were communicated with Elver Mccray on 03/06/2025 at 1:14 p.m. by Funmi Ornelas MD.  All CT scans are performed using dose optimization techniques as appropriate to the performed exam and include at least one of the following: Automated exposure control, adjustment of the mA and/or kV according to size, and the use of iterative reconstruction technique.  ______________________________________ Electronically signed by: FUNMI ORNELAS M.D. Date:     03/06/2025 Time:    13:08               Assessment/Plan   Principal Problem:    Altered mental status  Active Problems:    Chronic renal disease, stage III (HCC) [456608]    Acute kidney injury superimposed on chronic kidney disease    Chronic obstructive lung disease (HCC)    Paroxysmal atrial fibrillation (HCC)    Gastroesophageal reflux disease    Hypertensive disorder    History of DVT (deep vein thrombosis)    Hypothyroidism    Hyperlipidemia    Anxiety    Neuropathy  Resolved Problems:    * No resolved hospital problems. *    Principal Problem:    Altered mental status   Back to baseline   Active Problems:    Chronic renal disease, stage III (HCC) [753429]   nephrology consult -following  Ivf discontinued    Chronic obstructive lung disease (HCC)   No evidence of acute exaserbation    Paroxysmal atrial fibrillation (HCC)   Rate controlled   On coumadin   Tonia pending    Unable to tolerate and test aborted     Reschedule outpt   Cardiology following   Continue amiodarone    Gastroesophageal reflux disease   Continue home meds

## 2025-03-17 ENCOUNTER — APPOINTMENT (OUTPATIENT)
Dept: NUCLEAR MEDICINE | Age: 75
DRG: 069 | End: 2025-03-17
Payer: MEDICARE

## 2025-03-17 PROBLEM — S30.1XXA ABDOMINAL WALL HEMATOMA: Status: ACTIVE | Noted: 2025-03-17

## 2025-03-17 LAB
ACANTHOCYTES BLD QL SMEAR: ABNORMAL
ANION GAP SERPL CALCULATED.3IONS-SCNC: 9 MMOL/L (ref 8–16)
BASOPHILS # BLD: 0 K/UL (ref 0–0.2)
BASOPHILS NFR BLD: 0 % (ref 0–1)
BUN SERPL-MCNC: 20 MG/DL (ref 8–23)
BURR CELLS BLD QL SMEAR: ABNORMAL
CALCIUM SERPL-MCNC: 8.3 MG/DL (ref 8.8–10.2)
CHLORIDE SERPL-SCNC: 106 MMOL/L (ref 98–107)
CO2 SERPL-SCNC: 23 MMOL/L (ref 22–29)
CREAT SERPL-MCNC: 1.3 MG/DL (ref 0.5–0.9)
DACRYOCYTES BLD QL SMEAR: ABNORMAL
EOSINOPHIL # BLD: 0.18 K/UL (ref 0–0.6)
EOSINOPHIL NFR BLD: 1 % (ref 0–5)
ERYTHROCYTE [DISTWIDTH] IN BLOOD BY AUTOMATED COUNT: 15.7 % (ref 11.5–14.5)
FERRITIN SERPL-MCNC: 792 NG/ML (ref 13–150)
GLUCOSE BLD-MCNC: 102 MG/DL (ref 70–99)
GLUCOSE BLD-MCNC: 108 MG/DL (ref 70–99)
GLUCOSE BLD-MCNC: 143 MG/DL (ref 70–99)
GLUCOSE SERPL-MCNC: 98 MG/DL (ref 70–99)
HCT VFR BLD AUTO: 23.2 % (ref 37–47)
HGB BLD-MCNC: 7.7 G/DL (ref 12–16)
IMM GRANULOCYTES # BLD: 4.4 K/UL
INR PPP: 2.54 (ref 0.88–1.18)
IRON SATN MFR SERPL: 30 % (ref 15–50)
IRON SERPL-MCNC: 61 UG/DL (ref 37–145)
LYMPHOCYTES # BLD: 2.2 K/UL (ref 1.1–4.5)
LYMPHOCYTES NFR BLD: 11 % (ref 20–40)
MAGNESIUM SERPL-MCNC: 1.4 MG/DL (ref 1.6–2.4)
MCH RBC QN AUTO: 30.4 PG (ref 27–31)
MCHC RBC AUTO-ENTMCNC: 33.2 G/DL (ref 33–37)
MCV RBC AUTO: 91.7 FL (ref 81–99)
METAMYELOCYTES NFR BLD MANUAL: 7 %
MICROCYTES BLD QL SMEAR: ABNORMAL
MONOCYTES # BLD: 0.7 K/UL (ref 0–0.9)
MONOCYTES NFR BLD: 4 % (ref 0–10)
MYELOCYTES NFR BLD MANUAL: 1 %
NEUTROPHILS # BLD: 15.3 K/UL (ref 1.5–7.5)
NEUTS BAND NFR BLD MANUAL: 9 % (ref 0–5)
NEUTS SEG NFR BLD: 66 % (ref 50–65)
NRBC BLD-RTO: 1 /100 WBC
OVALOCYTES BLD QL SMEAR: ABNORMAL
PERFORMED ON: ABNORMAL
PLATELET # BLD AUTO: 356 K/UL (ref 130–400)
PLATELET SLIDE REVIEW: ADEQUATE
PMV BLD AUTO: 10.8 FL (ref 9.4–12.3)
POIKILOCYTOSIS BLD QL SMEAR: ABNORMAL
POLYCHROMASIA BLD QL SMEAR: ABNORMAL
POTASSIUM SERPL-SCNC: 3.2 MMOL/L (ref 3.5–5)
PROTHROMBIN TIME: 26.7 SEC (ref 12–14.6)
RBC # BLD AUTO: 2.53 M/UL (ref 4.2–5.4)
SCHISTOCYTES BLD QL SMEAR: ABNORMAL
SODIUM SERPL-SCNC: 138 MMOL/L (ref 136–145)
TARGETS BLD QL SMEAR: ABNORMAL
TIBC SERPL-MCNC: 205 UG/DL (ref 250–400)
VARIANT LYMPHS NFR BLD: 1 % (ref 0–8)
WBC # BLD AUTO: 18.4 K/UL (ref 4.8–10.8)

## 2025-03-17 PROCEDURE — A9502 TC99M TETROFOSMIN: HCPCS | Performed by: INTERNAL MEDICINE

## 2025-03-17 PROCEDURE — 6370000000 HC RX 637 (ALT 250 FOR IP): Performed by: HOSPITALIST

## 2025-03-17 PROCEDURE — 78452 HT MUSCLE IMAGE SPECT MULT: CPT

## 2025-03-17 PROCEDURE — 94640 AIRWAY INHALATION TREATMENT: CPT

## 2025-03-17 PROCEDURE — 2500000003 HC RX 250 WO HCPCS

## 2025-03-17 PROCEDURE — 6370000000 HC RX 637 (ALT 250 FOR IP)

## 2025-03-17 PROCEDURE — 85610 PROTHROMBIN TIME: CPT

## 2025-03-17 PROCEDURE — 6370000000 HC RX 637 (ALT 250 FOR IP): Performed by: NURSE PRACTITIONER

## 2025-03-17 PROCEDURE — 83540 ASSAY OF IRON: CPT

## 2025-03-17 PROCEDURE — 1200000000 HC SEMI PRIVATE

## 2025-03-17 PROCEDURE — 97535 SELF CARE MNGMENT TRAINING: CPT

## 2025-03-17 PROCEDURE — 83550 IRON BINDING TEST: CPT

## 2025-03-17 PROCEDURE — 99232 SBSQ HOSP IP/OBS MODERATE 35: CPT | Performed by: INTERNAL MEDICINE

## 2025-03-17 PROCEDURE — 6370000000 HC RX 637 (ALT 250 FOR IP): Performed by: INTERNAL MEDICINE

## 2025-03-17 PROCEDURE — 82728 ASSAY OF FERRITIN: CPT

## 2025-03-17 PROCEDURE — 94150 VITAL CAPACITY TEST: CPT

## 2025-03-17 PROCEDURE — 3430000000 HC RX DIAGNOSTIC RADIOPHARMACEUTICAL: Performed by: INTERNAL MEDICINE

## 2025-03-17 PROCEDURE — 85025 COMPLETE CBC W/AUTO DIFF WBC: CPT

## 2025-03-17 PROCEDURE — 97530 THERAPEUTIC ACTIVITIES: CPT

## 2025-03-17 PROCEDURE — 94760 N-INVAS EAR/PLS OXIMETRY 1: CPT

## 2025-03-17 PROCEDURE — 80048 BASIC METABOLIC PNL TOTAL CA: CPT

## 2025-03-17 PROCEDURE — 6360000002 HC RX W HCPCS: Performed by: HOSPITALIST

## 2025-03-17 PROCEDURE — 83735 ASSAY OF MAGNESIUM: CPT

## 2025-03-17 PROCEDURE — 82962 GLUCOSE BLOOD TEST: CPT

## 2025-03-17 PROCEDURE — 6360000002 HC RX W HCPCS: Performed by: NURSE PRACTITIONER

## 2025-03-17 RX ORDER — POTASSIUM CHLORIDE 1500 MG/1
20 TABLET, EXTENDED RELEASE ORAL 2 TIMES DAILY
Status: DISCONTINUED | OUTPATIENT
Start: 2025-03-17 | End: 2025-03-18 | Stop reason: HOSPADM

## 2025-03-17 RX ORDER — MAGNESIUM SULFATE IN WATER 40 MG/ML
4000 INJECTION, SOLUTION INTRAVENOUS ONCE
Status: COMPLETED | OUTPATIENT
Start: 2025-03-17 | End: 2025-03-17

## 2025-03-17 RX ORDER — WARFARIN SODIUM 2 MG/1
4 TABLET ORAL
Status: COMPLETED | OUTPATIENT
Start: 2025-03-17 | End: 2025-03-17

## 2025-03-17 RX ORDER — REGADENOSON 0.08 MG/ML
0.4 INJECTION, SOLUTION INTRAVENOUS
Status: COMPLETED | OUTPATIENT
Start: 2025-03-17 | End: 2025-03-17

## 2025-03-17 RX ORDER — POTASSIUM CHLORIDE 1500 MG/1
40 TABLET, EXTENDED RELEASE ORAL PRN
Status: DISCONTINUED | OUTPATIENT
Start: 2025-03-17 | End: 2025-03-18 | Stop reason: HOSPADM

## 2025-03-17 RX ORDER — MAGNESIUM SULFATE 1 G/100ML
1000 INJECTION INTRAVENOUS PRN
Status: DISCONTINUED | OUTPATIENT
Start: 2025-03-17 | End: 2025-03-18 | Stop reason: HOSPADM

## 2025-03-17 RX ORDER — POTASSIUM CHLORIDE 7.45 MG/ML
10 INJECTION INTRAVENOUS PRN
Status: DISCONTINUED | OUTPATIENT
Start: 2025-03-17 | End: 2025-03-18 | Stop reason: HOSPADM

## 2025-03-17 RX ADMIN — PANTOPRAZOLE SODIUM 40 MG: 40 TABLET, DELAYED RELEASE ORAL at 06:00

## 2025-03-17 RX ADMIN — ACETAMINOPHEN 650 MG: 325 TABLET ORAL at 12:20

## 2025-03-17 RX ADMIN — TIOTROPIUM BROMIDE AND OLODATEROL 2 PUFF: 3.124; 2.736 SPRAY, METERED RESPIRATORY (INHALATION) at 07:57

## 2025-03-17 RX ADMIN — DICLOFENAC SODIUM 2 G: 10 GEL TOPICAL at 07:51

## 2025-03-17 RX ADMIN — CALCITRIOL CAPSULES 0.25 MCG 0.5 MCG: 0.25 CAPSULE ORAL at 07:49

## 2025-03-17 RX ADMIN — SUCRALFATE 1 G: 1 TABLET ORAL at 12:20

## 2025-03-17 RX ADMIN — MAGNESIUM SULFATE HEPTAHYDRATE 4000 MG: 40 INJECTION, SOLUTION INTRAVENOUS at 07:54

## 2025-03-17 RX ADMIN — LEVOTHYROXINE SODIUM 100 MCG: 100 TABLET ORAL at 06:00

## 2025-03-17 RX ADMIN — SUCRALFATE 1 G: 1 TABLET ORAL at 06:00

## 2025-03-17 RX ADMIN — POTASSIUM CHLORIDE 20 MEQ: 1500 TABLET, EXTENDED RELEASE ORAL at 13:35

## 2025-03-17 RX ADMIN — REGADENOSON 0.4 MG: 0.08 INJECTION, SOLUTION INTRAVENOUS at 11:07

## 2025-03-17 RX ADMIN — ROSUVASTATIN 5 MG: 10 TABLET, FILM COATED ORAL at 07:49

## 2025-03-17 RX ADMIN — SUCRALFATE 1 G: 1 TABLET ORAL at 20:19

## 2025-03-17 RX ADMIN — ASPIRIN 81 MG: 81 TABLET, CHEWABLE ORAL at 07:50

## 2025-03-17 RX ADMIN — PANTOPRAZOLE SODIUM 40 MG: 40 TABLET, DELAYED RELEASE ORAL at 16:45

## 2025-03-17 RX ADMIN — METOPROLOL TARTRATE 25 MG: 25 TABLET, FILM COATED ORAL at 07:49

## 2025-03-17 RX ADMIN — SODIUM CHLORIDE, PRESERVATIVE FREE 10 ML: 5 INJECTION INTRAVENOUS at 20:21

## 2025-03-17 RX ADMIN — POTASSIUM BICARBONATE 20 MEQ: 782 TABLET, EFFERVESCENT ORAL at 07:49

## 2025-03-17 RX ADMIN — TETROFOSMIN 8 MILLICURIE: 0.23 INJECTION, POWDER, LYOPHILIZED, FOR SOLUTION INTRAVENOUS at 13:11

## 2025-03-17 RX ADMIN — AMIODARONE HYDROCHLORIDE 100 MG: 200 TABLET ORAL at 07:49

## 2025-03-17 RX ADMIN — WARFARIN SODIUM 4 MG: 2 TABLET ORAL at 16:45

## 2025-03-17 RX ADMIN — POTASSIUM CHLORIDE 20 MEQ: 1500 TABLET, EXTENDED RELEASE ORAL at 20:19

## 2025-03-17 RX ADMIN — METOPROLOL TARTRATE 25 MG: 25 TABLET, FILM COATED ORAL at 20:19

## 2025-03-17 RX ADMIN — TETROFOSMIN 24 MILLICURIE: 0.23 INJECTION, POWDER, LYOPHILIZED, FOR SOLUTION INTRAVENOUS at 13:10

## 2025-03-17 RX ADMIN — Medication 400 MG: at 07:49

## 2025-03-17 RX ADMIN — SUCRALFATE 1 G: 1 TABLET ORAL at 16:45

## 2025-03-17 RX ADMIN — ESCITALOPRAM OXALATE 20 MG: 10 TABLET ORAL at 20:19

## 2025-03-17 ASSESSMENT — ENCOUNTER SYMPTOMS
COLOR CHANGE: 1
ABDOMINAL PAIN: 1

## 2025-03-17 ASSESSMENT — PAIN SCALES - GENERAL: PAINLEVEL_OUTOF10: 0

## 2025-03-17 ASSESSMENT — PAIN SCALES - WONG BAKER: WONGBAKER_NUMERICALRESPONSE: NO HURT

## 2025-03-17 NOTE — PLAN OF CARE
Problem: Chronic Conditions and Co-morbidities  Goal: Patient's chronic conditions and co-morbidity symptoms are monitored and maintained or improved  Outcome: Progressing  Flowsheets (Taken 3/16/2025 2205)  Care Plan - Patient's Chronic Conditions and Co-Morbidity Symptoms are Monitored and Maintained or Improved: Monitor and assess patient's chronic conditions and comorbid symptoms for stability, deterioration, or improvement     Problem: Discharge Planning  Goal: Discharge to home or other facility with appropriate resources  Outcome: Progressing  Flowsheets (Taken 3/16/2025 2205)  Discharge to home or other facility with appropriate resources: Identify barriers to discharge with patient and caregiver     Problem: Skin/Tissue Integrity  Goal: Skin integrity remains intact  Description: 1.  Monitor for areas of redness and/or skin breakdown  2.  Assess vascular access sites hourly  3.  Every 4-6 hours minimum:  Change oxygen saturation probe site  4.  Every 4-6 hours:  If on nasal continuous positive airway pressure, respiratory therapy assess nares and determine need for appliance change or resting period  Outcome: Progressing  Flowsheets (Taken 3/16/2025 2205)  Skin Integrity Remains Intact: Monitor for areas of redness and/or skin breakdown     Problem: Safety - Adult  Goal: Free from fall injury  Outcome: Progressing  Flowsheets (Taken 3/17/2025 0313)  Free From Fall Injury: Instruct family/caregiver on patient safety     Problem: Confusion  Goal: Confusion, delirium, dementia, or psychosis is improved or at baseline  Description: INTERVENTIONS:  1. Assess for possible contributors to thought disturbance, including medications, impaired vision or hearing, underlying metabolic abnormalities, dehydration, psychiatric diagnoses, and notify attending LIP  2. [place] high risk fall precautions, as indicated  3. Provide frequent short contacts to provide reality reorientation, refocusing and direction  4.

## 2025-03-17 NOTE — PROGRESS NOTES
Nephrology ([facility]) Progress Note    Patient:  Leny Stone  YOB: 1950  Date of Service: 3/17/2025  MRN: [id number]   Acct: [id number]   Primary Care Physician: Regina Curtis MD  Advance Directive: Full Code  Admit Date: 3/6/2025       Hospital Day: 11  Referring Provider: Karol Hoawrd MD    Patient independently seen and examined, Chart, Consults, Notes, Operative notes, Labs, Cardiology, and Radiology studies reviewed as available.    Chief complaint: Abnormal labs.    Subjective:  Leny Stone is a 74 y.o. female for whom we were consulted for evaluation and treatment of acute kidney injury/chronic kidney disease.  Patient has history of stage IIIb chronic kidney disease baseline, follows up in the office.  Admitted to hospital with change in mental status on March 6.  Patient has history of previous stroke.  She underwent CT scan of the head and neck with IV contrast with negative study.  Hospital course remarkable for worsening of renal function, serum creatinine peaked to 2.5 mg.  She was treated with IV fluid and has improvement of renal function.  Patient also had the MRI of the brain consistent moderate cerebral atrophy.    This morning, she feels good, denies any shortness of breath she is fully alert and awake.    Allergies:  Insect extract, Morphine, Lactose intolerance (gi), Lortab [hydrocodone-acetaminophen], Other, Oxycodone-acetaminophen, Prednisone, Tramadol, Ultram [tramadol hcl], and Zanaflex [tizanidine hcl]    Medicines:  Current Facility-Administered Medications   Medication Dose Route Frequency Provider Last Rate Last Admin    magnesium sulfate 4000 mg in 100 mL IVPB premix  4,000 mg IntraVENous Once Karol Howard MD 25 mL/hr at 03/17/25 0754 4,000 mg at 03/17/25 0754    regadenoson (LEXISCAN) injection 0.4 mg  0.4 mg IntraVENous ONCE PRN Carol Soni APRN - CNP        warfarin (COUMADIN) tablet 4 mg  4 mg Oral Once Karol Howard MD

## 2025-03-17 NOTE — PROGRESS NOTES
Cardiology Daily Note Tyrone Ayon MD      Patient:  Leny Stone  [id number]    Patient Active Problem List    Diagnosis Date Noted    Fibromyalgia 03/14/2014    Paroxysmal atrial fibrillation ([facility]) 08/29/2022    Paroxysmal SVT (supraventricular tachycardia) 08/29/2022    SSS (sick sinus syndrome) ([facility]) 08/29/2022    Acute kidney injury superimposed on chronic kidney disease 08/17/2022    Chronic obstructive lung disease ([facility]) 05/24/2022    Chronic renal disease, stage III ([facility]) [753707] 04/24/2022    Altered mental status 03/06/2025    Anxiety     Neuropathy     Somnolence, daytime 03/18/2024    Snoring 03/18/2024    Witnessed apneic spells 03/18/2024    Difficulty using biphasic positive airway pressure (BiPAP) machine 03/18/2024    Atrial fibrillation with rapid ventricular response ([facility]) 02/08/2024    Chest pain, unspecified 02/08/2024    Hypertension 02/08/2024    Subtherapeutic international normalized ratio (INR) 02/08/2024    Unspecified severe protein-calorie malnutrition 01/18/2022    H/O systemic lupus erythematosus (SLE) ([facility]) 01/18/2022    Type II diabetes mellitus with nephropathy ([facility]) 01/18/2022    Stable angina 01/18/2022    Stage 3a chronic kidney disease ([facility]) 01/18/2022    Chronic deep vein thrombosis (DVT) of proximal vein of lower extremity ([facility]) 10/12/2021    Nonrheumatic mitral valve regurgitation 08/16/2021    Skin ulcer of upper arm, with fat layer exposed ([facility]) 05/04/2021    Dog bite 04/29/2021    Cellulitis of right upper extremity 04/29/2021    Abscess of right arm 04/29/2021    Soft tissue infection 04/29/2021    Lower abdominal pain 04/07/2021    Chronic heartburn 04/07/2021    S/P gastric bypass 04/07/2021    Severe episode of recurrent major depressive disorder, without psychotic features ([facility]) 07/17/2020    Gastroenteritis 01/25/2020    Colic 01/25/2020    Abdominal pain 01/25/2020    Asthmatic bronchitis without complication 01/13/2020    Thrombocytopenia 12/25/2019

## 2025-03-17 NOTE — PROGRESS NOTES
[facility]      Patient:  Leny Stone  YOB: 1950  Date of Service: 3/17/2025  MRN: [id number]   Acct: [id number]   Primary Care Physician: Regina Curtis MD  Advance Directive: Full Code  Admit Date: 3/6/2025       Hospital Day: 11  Portions of this note have been copied forward, however, changed to reflect the most current clinical status of this patient.    CHIEF COMPLAINT AMS    SUBJECTIVE:  She is back at her baseline mental status. She states that her left abdomen hematoma has improved. Hypertension improved. Afebrile.     CUMULATIVE HOSPITAL STAY:  The patient is a 73 yo female with a PMH of lupus, CKD IIIb, HTN, CHF, hypothyroidism, anxiety, type 2 diabetes, and afib on warfarin who presented to [facility] ED on 3/6/2025 with concern for AMS.  She was brought into the ED after her son-in-law found her having an altered mental status.  Code stroke was called on arrival to the ED.  Her family reported she does have a history of medication noncompliance at times and is not always compliant with her CPAP at night.  Further ED workup revealed CT head no acute intracranial abnormalities, CTA head neck with no extracranial carotid or vertebral artery stenosis, no large vessel occlusion or high-grade stenosis within the [place], CTP no acute infarct or ischemia.  CT chest no acute findings.  CT of the abdomen pelvis no acute or suspicious process inferior vena cava with jonah filter, bilateral lower lobes mild subsegmental atelectasis. WBC 7.2, H&H 11.9/37.2, platelets 88, glucose 68-->163-->116, troponin 29, lactic acid 1.8, creatinine 1.6, BUN 19, GFR 33. Patient was admitted to hospital medicine for further evaluation with neurology consultation.   MRI obtained of the brain with no acute findings.  EEG no clear seizure activity.  Neurology felt like altered mental status event was TIA and recommended continuing full anticoagulation.  She complained of chest pain on 3/7/2025.

## 2025-03-17 NOTE — PROGRESS NOTES
Clinical Pharmacy Note    Warfarin consult follow-up    Recent Labs     03/17/25  0042   INR 2.54*     Recent Labs     03/15/25  0120 03/16/25  0322 03/17/25  0042   HGB 7.5* 7.6* 7.7*   HCT 22.2* 23.4* 23.2*    341 356     urrent warfarin drug-drug interactions:    Amiodarone-Monitor patients extra closely for evidence of increased anticoagulant effects if amiodarone is initiated/dose increased, or decreased effects if amiodarone is discontinued/dose decreased, though due to the long half-life of amiodarone, any interaction may persist for several days/weeks following any dose decrease or discontinuation.   Carafate-Specifically, sucralfate may decrease the absorption of Vitamin K Antagonists.   Crestor-HMG-CoA Reductase Inhibitors (Statins) may enhance the anticoagulant effect of Vitamin K Antagonists.   Lexapro- Closely monitor patients for clinical and laboratory evidence of bleeding if these agents are combined.  Synthroid-Monitor for increased anticoagulant effects of vitamin K antagonists if a thyroid product is initiated/dose increased, or decreased effects if a thyroid product is discontinued/dose decreased.     Date INR Warfarin Dose   03/06/25 1.39 5 mg ordered but not taken   03/07/25   1.19 5 mg     03/08/25   1.24 5 mg     03/09/25   1.30 6 mg    03/10/25  1.49  6 mg   03/11/25  1.95  6 mg     03/12/25 2.17  6 mg    03/13/25 2.31 6 mg    03/14/25  2.20  6 mg    03/15/25  2.09 6 mg    03/16/25 2.16 6 mg   03/17/25 2.54 4 mg            Notes:   Give Warfarin 4 mg x 1 today                  Daily PT/INR until stable within therapeutic range.     Electronically signed by Milton Falcon RPH on 3/17/2025 at 11:12 AM

## 2025-03-17 NOTE — PROGRESS NOTES
03/17/25 1400   Subjective   Subjective Not now ( gait ) .  My head is hurting.  (Patient declined mobility at this time.)   PT Plan of Care   Monday R       Electronically signed by Fam Centeno PTA on 3/17/2025 at 2:16 PM

## 2025-03-17 NOTE — PROGRESS NOTES
Occupational Therapy     03/17/25 1600   Subjective   Subjective Pt in bed upon arrival for therapy. Pt agreeable to participate.   Pain Assessment   Pain Assessment None - Denies Pain   Vitals   O2 Device None (Room air)   Cognition   Overall Cognitive Status WFL   Orientation   Overall Orientation Status WFL   Bed Mobility Training   Bed Mobility Training Yes   Overall Level of Assistance Stand by assistance   Interventions Verbal cues   Supine to Sit Stand by assistance   Scooting Stand by assistance   Transfer Training   Transfer Training Yes   Overall Level of Assistance Contact guard assistance   Interventions Verbal cues;Tactile cues   Sit to Stand Contact guard assistance   Stand to Sit Contact guard assistance   Bed to Chair Contact guard assistance   Toilet Transfer Contact guard assistance   Balance   Sitting Intact   Standing With support  (RW)   ADL   Feeding Setup;Verbal cueing   Grooming Setup;Verbal cueing   UE Bathing Stand by assistance   LE Bathing Minimal assistance   UE Dressing Stand by assistance   LE Dressing Minimal assistance   Putting On/Taking Off Footwear Minimal assistance   Toileting Contact guard assistance   Functional Mobility Contact guard assistance   Functional Mobility Skilled Clinical Factors RW   Assessment   Assessment Tx focused on toileting task using BSC, functional mobility at RW level in hallway and in room, and transfer to recliner to sit up this pm. All needs in reach.   Activity Tolerance Patient tolerated treatment well   Discharge Recommendations Patient would benefit from continued therapy after discharge   Occupational Therapy Plan   Times Per Week 3-5x/wk   Times Per Day Once a day   OT Plan of Care   Monday X   Electronically signed by BETTE Smart on 3/17/2025 at 4:17 PM

## 2025-03-18 VITALS
OXYGEN SATURATION: 100 % | HEART RATE: 72 BPM | BODY MASS INDEX: 37.67 KG/M2 | SYSTOLIC BLOOD PRESSURE: 149 MMHG | WEIGHT: 240 LBS | TEMPERATURE: 98.2 F | DIASTOLIC BLOOD PRESSURE: 78 MMHG | HEIGHT: 67 IN | RESPIRATION RATE: 18 BRPM

## 2025-03-18 LAB
ACANTHOCYTES BLD QL SMEAR: ABNORMAL
ALBUMIN SERPL-MCNC: 3.1 G/DL (ref 3.5–5.2)
ALP SERPL-CCNC: 110 U/L (ref 35–104)
ALT SERPL-CCNC: 31 U/L (ref 10–35)
ANION GAP SERPL CALCULATED.3IONS-SCNC: 9 MMOL/L (ref 8–16)
ANISOCYTOSIS BLD QL SMEAR: ABNORMAL
AST SERPL-CCNC: 65 U/L (ref 10–35)
BASOPHILS # BLD: 0.2 K/UL (ref 0–0.2)
BASOPHILS NFR BLD: 1 % (ref 0–1)
BILIRUB SERPL-MCNC: 0.8 MG/DL (ref 0.2–1.2)
BUN SERPL-MCNC: 20 MG/DL (ref 8–23)
BURR CELLS BLD QL SMEAR: ABNORMAL
CALCIUM SERPL-MCNC: 8.8 MG/DL (ref 8.8–10.2)
CHLORIDE SERPL-SCNC: 105 MMOL/L (ref 98–107)
CO2 SERPL-SCNC: 24 MMOL/L (ref 22–29)
CREAT SERPL-MCNC: 1.2 MG/DL (ref 0.5–0.9)
EOSINOPHIL # BLD: 0.35 K/UL (ref 0–0.6)
EOSINOPHIL NFR BLD: 2 % (ref 0–5)
ERYTHROCYTE [DISTWIDTH] IN BLOOD BY AUTOMATED COUNT: 16.4 % (ref 11.5–14.5)
GLUCOSE BLD-MCNC: 101 MG/DL (ref 70–99)
GLUCOSE BLD-MCNC: 121 MG/DL (ref 70–99)
GLUCOSE BLD-MCNC: 127 MG/DL (ref 70–99)
GLUCOSE SERPL-MCNC: 110 MG/DL (ref 70–99)
HCT VFR BLD AUTO: 25.7 % (ref 37–47)
HGB BLD-MCNC: 8.4 G/DL (ref 12–16)
IMM GRANULOCYTES # BLD: 3 K/UL
INR PPP: 2.48 (ref 0.88–1.18)
LYMPHOCYTES # BLD: 1.9 K/UL (ref 1.1–4.5)
LYMPHOCYTES NFR BLD: 9 % (ref 20–40)
MAGNESIUM SERPL-MCNC: 2.1 MG/DL (ref 1.6–2.4)
MCH RBC QN AUTO: 30.3 PG (ref 27–31)
MCHC RBC AUTO-ENTMCNC: 32.7 G/DL (ref 33–37)
MCV RBC AUTO: 92.8 FL (ref 81–99)
METAMYELOCYTES NFR BLD MANUAL: 3 %
MONOCYTES # BLD: 0.7 K/UL (ref 0–0.9)
MONOCYTES NFR BLD: 4 % (ref 0–10)
MYELOCYTES NFR BLD MANUAL: 2 %
NEUTROPHILS # BLD: 14.2 K/UL (ref 1.5–7.5)
NEUTS BAND NFR BLD MANUAL: 1 % (ref 0–5)
NEUTS SEG NFR BLD: 76 % (ref 50–65)
NUC STRESS EJECTION FRACTION: 60 %
PERFORMED ON: ABNORMAL
PLATELET # BLD AUTO: 358 K/UL (ref 130–400)
PLATELET SLIDE REVIEW: ADEQUATE
PMV BLD AUTO: 10.6 FL (ref 9.4–12.3)
POIKILOCYTOSIS BLD QL SMEAR: ABNORMAL
POLYCHROMASIA BLD QL SMEAR: ABNORMAL
POTASSIUM SERPL-SCNC: 3.8 MMOL/L (ref 3.5–5.1)
PROT SERPL-MCNC: 5.9 G/DL (ref 6.4–8.3)
PROTHROMBIN TIME: 26.2 SEC (ref 12–14.6)
RBC # BLD AUTO: 2.77 M/UL (ref 4.2–5.4)
SCHISTOCYTES BLD QL SMEAR: ABNORMAL
SODIUM SERPL-SCNC: 138 MMOL/L (ref 136–145)
STRESS BASELINE DIAS BP: 116 MMHG
STRESS BASELINE HR: 66 BPM
STRESS BASELINE SYS BP: 154 MMHG
STRESS EXERCISE DUR MIN: 5 MIN
STRESS EXERCISE DUR SEC: 0 SEC
STRESS PEAK DIAS BP: 76 MMHG
STRESS PEAK SYS BP: 137 MMHG
STRESS PERCENT HR ACHIEVED: 58 %
STRESS POST PEAK HR: 84 BPM
STRESS RATE PRESSURE PRODUCT: NORMAL BPM*MMHG
STRESS STAGE 1 BP: NORMAL MMHG
STRESS STAGE 1 COMMENTS: NORMAL
STRESS STAGE 1 HR: 79 BPM
STRESS STAGE 2 BP: NORMAL MMHG
STRESS STAGE 2 HR: 84 BPM
STRESS STAGE 3 BP: NORMAL MMHG
STRESS STAGE 3 HR: 83 BPM
STRESS STAGE 4 BP: NORMAL MMHG
STRESS STAGE 4 HR: 82 BPM
STRESS STAGE 5 BP: NORMAL MMHG
STRESS STAGE 5 HR: 82 BPM
STRESS STAGE RECOVERY 1 BP: NORMAL MMHG
STRESS STAGE RECOVERY 1 HR: 79 BPM
STRESS TARGET HR: 145 BPM
VARIANT LYMPHS NFR BLD: 2 % (ref 0–8)
WBC # BLD AUTO: 17.3 K/UL (ref 4.8–10.8)

## 2025-03-18 PROCEDURE — 93018 CV STRESS TEST I&R ONLY: CPT | Performed by: INTERNAL MEDICINE

## 2025-03-18 PROCEDURE — 94760 N-INVAS EAR/PLS OXIMETRY 1: CPT

## 2025-03-18 PROCEDURE — 85025 COMPLETE CBC W/AUTO DIFF WBC: CPT

## 2025-03-18 PROCEDURE — 83735 ASSAY OF MAGNESIUM: CPT

## 2025-03-18 PROCEDURE — 80053 COMPREHEN METABOLIC PANEL: CPT

## 2025-03-18 PROCEDURE — 94640 AIRWAY INHALATION TREATMENT: CPT

## 2025-03-18 PROCEDURE — 2500000003 HC RX 250 WO HCPCS

## 2025-03-18 PROCEDURE — 94150 VITAL CAPACITY TEST: CPT

## 2025-03-18 PROCEDURE — 36415 COLL VENOUS BLD VENIPUNCTURE: CPT

## 2025-03-18 PROCEDURE — 6370000000 HC RX 637 (ALT 250 FOR IP)

## 2025-03-18 PROCEDURE — 78452 HT MUSCLE IMAGE SPECT MULT: CPT | Performed by: INTERNAL MEDICINE

## 2025-03-18 PROCEDURE — 85610 PROTHROMBIN TIME: CPT

## 2025-03-18 PROCEDURE — 6370000000 HC RX 637 (ALT 250 FOR IP): Performed by: NURSE PRACTITIONER

## 2025-03-18 PROCEDURE — 82962 GLUCOSE BLOOD TEST: CPT

## 2025-03-18 PROCEDURE — 6370000000 HC RX 637 (ALT 250 FOR IP): Performed by: INTERNAL MEDICINE

## 2025-03-18 PROCEDURE — 93016 CV STRESS TEST SUPVJ ONLY: CPT | Performed by: INTERNAL MEDICINE

## 2025-03-18 RX ORDER — NITROGLYCERIN 0.4 MG/1
0.4 TABLET SUBLINGUAL EVERY 5 MIN PRN
Qty: 25 TABLET | Refills: 0 | Status: SHIPPED | OUTPATIENT
Start: 2025-03-18

## 2025-03-18 RX ORDER — WARFARIN SODIUM 5 MG/1
5 TABLET ORAL
Status: DISCONTINUED | OUTPATIENT
Start: 2025-03-18 | End: 2025-03-18 | Stop reason: HOSPADM

## 2025-03-18 RX ADMIN — POTASSIUM CHLORIDE 20 MEQ: 1500 TABLET, EXTENDED RELEASE ORAL at 10:02

## 2025-03-18 RX ADMIN — Medication 400 MG: at 10:02

## 2025-03-18 RX ADMIN — ASPIRIN 81 MG: 81 TABLET, CHEWABLE ORAL at 10:02

## 2025-03-18 RX ADMIN — AMIODARONE HYDROCHLORIDE 100 MG: 200 TABLET ORAL at 12:04

## 2025-03-18 RX ADMIN — SODIUM CHLORIDE, PRESERVATIVE FREE 10 ML: 5 INJECTION INTRAVENOUS at 10:04

## 2025-03-18 RX ADMIN — LEVOTHYROXINE SODIUM 100 MCG: 100 TABLET ORAL at 06:02

## 2025-03-18 RX ADMIN — TIOTROPIUM BROMIDE AND OLODATEROL 2 PUFF: 3.124; 2.736 SPRAY, METERED RESPIRATORY (INHALATION) at 07:02

## 2025-03-18 RX ADMIN — PANTOPRAZOLE SODIUM 40 MG: 40 TABLET, DELAYED RELEASE ORAL at 06:02

## 2025-03-18 RX ADMIN — SUCRALFATE 1 G: 1 TABLET ORAL at 12:00

## 2025-03-18 RX ADMIN — CALCITRIOL CAPSULES 0.25 MCG 0.5 MCG: 0.25 CAPSULE ORAL at 10:02

## 2025-03-18 RX ADMIN — DICLOFENAC SODIUM 2 G: 10 GEL TOPICAL at 10:03

## 2025-03-18 RX ADMIN — SUCRALFATE 1 G: 1 TABLET ORAL at 06:02

## 2025-03-18 ASSESSMENT — PAIN SCALES - GENERAL
PAINLEVEL_OUTOF10: 0
PAINLEVEL_OUTOF10: 3

## 2025-03-18 ASSESSMENT — PAIN SCALES - WONG BAKER: WONGBAKER_NUMERICALRESPONSE: NO HURT

## 2025-03-18 NOTE — PROGRESS NOTES
Nephrology ([facility]) Progress Note    Patient:  Leny Stone  YOB: 1950  Date of Service: 3/18/2025  MRN: [id number]   Acct: [id number]   Primary Care Physician: Regina Curtis MD  Advance Directive: Full Code  Admit Date: 3/6/2025       Hospital Day: 12  Referring Provider: Karol Howard MD    Patient independently seen and examined, Chart, Consults, Notes, Operative notes, Labs, Cardiology, and Radiology studies reviewed as available.    Chief complaint: Abnormal labs.    Subjective:  Leny Stone is a 74 y.o. female for whom we were consulted for evaluation and treatment of acute kidney injury/chronic kidney disease.  Patient has history of stage IIIb chronic kidney disease baseline, follows up in the office.  Admitted to hospital with change in mental status on March 6.  Patient has history of previous stroke.  She underwent CT scan of the head and neck with IV contrast with negative study.  Hospital course remarkable for worsening of renal function, serum creatinine peaked to 2.5 mg.  She was treated with IV fluid and has improvement of renal function.  Patient also had the MRI of the brain consistent moderate cerebral atrophy.    This morning patient feels well.  She denies any shortness of breath.  She is open to trying to walk with a walker and exercise.    Allergies:  Insect extract, Morphine, Lactose intolerance (gi), Lortab [hydrocodone-acetaminophen], Other, Oxycodone-acetaminophen, Prednisone, Tramadol, Ultram [tramadol hcl], and Zanaflex [tizanidine hcl]    Medicines:  Current Facility-Administered Medications   Medication Dose Route Frequency Provider Last Rate Last Admin    potassium chloride (KLOR-CON M) extended release tablet 20 mEq  20 mEq Oral BID Fidencio Connor MD   20 mEq at 03/17/25 2019    technetium tetrofosmin (Tc-MYOVIEW) injection 8 millicurie  8 millicurie IntraVENous ONCE PRN Tyrone Ayon MD        technetium tetrofosmin (Tc-MYOVIEW)  injection 24 millicurie  24 millicurie IntraVENous ONCE PRN Tyrone Ayon MD        magnesium sulfate 1000 mg in dextrose 5% 100 mL IVPB  1,000 mg IntraVENous PRN Carl Perkins MD        potassium chloride (KLOR-CON M) extended release tablet 40 mEq  40 mEq Oral PRN Carl Perkins MD        Or    potassium bicarb-citric acid (EFFER-K) effervescent tablet 40 mEq  40 mEq Oral PRN Carl Perkins MD        Or    potassium chloride 10 mEq/100 mL IVPB (Peripheral Line)  10 mEq IntraVENous PRN Carl Perkins MD        magnesium oxide (MAG-OX) tablet 400 mg  400 mg Oral Daily Shade Matias MD   400 mg at 03/17/25 0749    escitalopram (LEXAPRO) tablet 20 mg  20 mg Oral Nightly Carol Soni APRN - CNP   20 mg at 03/17/25 2019    0.9 % sodium chloride infusion   IntraVENous PRN Carol Soni APRN - CNP        0.9 % sodium chloride infusion   IntraVENous PRN Carol Soni APRN - CNP        calcitRIOL (ROCALTROL) capsule 0.5 mcg  0.5 mcg Oral Daily Shade Matias MD   0.5 mcg at 03/17/25 0749    [Held by provider] midodrine (PROAMATINE) tablet 5 mg  5 mg Oral TID  Shade Matias MD   5 mg at 03/16/25 1603    rosuvastatin (CRESTOR) tablet 5 mg  5 mg Oral Daily Carol Soni APRN - CNP   5 mg at 03/17/25 0749    loperamide (IMODIUM) capsule 4 mg  4 mg Oral 4x Daily PRN Carol Soni APRN - CNP   4 mg at 03/11/25 1554    acetaminophen (TYLENOL) tablet 650 mg  650 mg Oral Q4H PRN Carol Soni APRN - CNP   650 mg at 03/17/25 1220    glucose chewable tablet 16 g  4 tablet Oral PRN Margareth Stanley RN        dextrose bolus 10% 125 mL  125 mL IntraVENous PRN Margareth Stanley RN        Or    dextrose bolus 10% 250 mL  250 mL IntraVENous PRN Margareth Stanley RN   Stopped at 03/10/25 2118    glucagon injection 1 mg  1 mg SubCUTAneous PRN Margareth Stanley RN   1 mg at 03/11/25 0740    dextrose 10 % infusion   IntraVENous Continuous PRN Margareth Stanley, RN   Stopped at 03/11/25 3362    diclofenac sodium

## 2025-03-18 NOTE — PROGRESS NOTES
Patient and daughter given DC education at bedside. Midline removed. Tele removed. Educated on follow-up appointments and education.

## 2025-03-18 NOTE — CARE COORDINATION
03/18/25 1454   IMM Letter   IMM Letter given to Patient/Family/Significant other/Guardian/POA/by: kiersten mejia sw   IMM Letter date given: 03/18/25   IMM Letter time given: 0250     Second IMM given to patient and explained with patient verbalizing understanding.  All questions and concerns addressed     Signed letter placed in pt soft chart   Patient declined waiting 4 hr period prior to discharge.   Electronically signed by Kiersetn Mejia on 3/18/2025 at 2:54 PM

## 2025-03-18 NOTE — CASE COMMUNICATION
Ms. Stone's daughter, Vero, is currently refusing metoprolol and amiodarone. She does not feel that he mother needs it. I discussed with her that it was recommended by the cardiology team and that not taking these medications could worsen her mother's conditions-she was thought to have had a TIA as the cause of her mental status change. Uncontrolled BP could cause worsening renal function. She states that she wishes to simplify her regimen as she often gets \"high BP reads in an office and get 17 medications\". I have encouraged her to discuss her concerns with nephrology and cardiology and what medications these specialities feels that her mother requires from their standpoint. She verbalizes understanding

## 2025-03-18 NOTE — PLAN OF CARE
Problem: Chronic Conditions and Co-morbidities  Goal: Patient's chronic conditions and co-morbidity symptoms are monitored and maintained or improved  Outcome: Progressing     Problem: Discharge Planning  Goal: Discharge to home or other facility with appropriate resources  Outcome: Progressing     Problem: Skin/Tissue Integrity  Goal: Skin integrity remains intact  Description: 1.  Monitor for areas of redness and/or skin breakdown  2.  Assess vascular access sites hourly  3.  Every 4-6 hours minimum:  Change oxygen saturation probe site  4.  Every 4-6 hours:  If on nasal continuous positive airway pressure, respiratory therapy assess nares and determine need for appliance change or resting period  Outcome: Progressing     Problem: Safety - Adult  Goal: Free from fall injury  Outcome: Progressing     Problem: Confusion  Goal: Confusion, delirium, dementia, or psychosis is improved or at baseline  Description: INTERVENTIONS:  1. Assess for possible contributors to thought disturbance, including medications, impaired vision or hearing, underlying metabolic abnormalities, dehydration, psychiatric diagnoses, and notify attending LIP  2. [place] high risk fall precautions, as indicated  3. Provide frequent short contacts to provide reality reorientation, refocusing and direction  4. Decrease environmental stimuli, including noise as appropriate  5. Monitor and intervene to maintain adequate nutrition, hydration, elimination, sleep and activity  6. If unable to ensure safety without constant attention obtain sitter and review sitter guidelines with assigned personnel  7. Initiate Psychosocial CNS and Spiritual Care consult, as indicated  Outcome: Progressing     Problem: Pain  Goal: Verbalizes/displays adequate comfort level or baseline comfort level  Outcome: Progressing     Problem: Nutrition Deficit:  Goal: Optimize nutritional status  Outcome: Progressing     Problem: Hematologic - Adult  Goal: Maintains  hematologic stability  Outcome: Progressing

## 2025-03-18 NOTE — PROGRESS NOTES
Clinical Pharmacy Note    Warfarin consult follow-up    Recent Labs     03/18/25  1110   INR 2.48*     Recent Labs     03/16/25  0322 03/17/25  0042 03/18/25  1110   HGB 7.6* 7.7* 8.4*   HCT 23.4* 23.2* 25.7*    356 358     Current warfarin drug-drug interactions:    Amiodarone-Monitor patients extra closely for evidence of increased anticoagulant effects if amiodarone is initiated/dose increased, or decreased effects if amiodarone is discontinued/dose decreased, though due to the long half-life of amiodarone, any interaction may persist for several days/weeks following any dose decrease or discontinuation.   Carafate-Specifically, sucralfate may decrease the absorption of Vitamin K Antagonists.   Crestor-HMG-CoA Reductase Inhibitors (Statins) may enhance the anticoagulant effect of Vitamin K Antagonists.   Lexapro- Closely monitor patients for clinical and laboratory evidence of bleeding if these agents are combined.  Synthroid-Monitor for increased anticoagulant effects of vitamin K antagonists if a thyroid product is initiated/dose increased, or decreased effects if a thyroid product is discontinued/dose decreased.     Date INR Warfarin Dose   03/06/25 1.39 5 mg ordered but not taken   03/07/25   1.19 5 mg     03/08/25   1.24 5 mg     03/09/25   1.30 6 mg    03/10/25  1.49  6 mg   03/11/25  1.95  6 mg     03/12/25 2.17  6 mg    03/13/25 2.31 6 mg    03/14/25  2.20  6 mg    03/15/25  2.09 6 mg    03/16/25 2.16 6 mg   03/17/25 2.54 4 mg   03/18/25 2.48 5 mg           Notes: Give Warfarin 5 mg x 1 today                    Daily PT/INR until stable within therapeutic range.     Electronically signed by Milton Falcon RPH on 3/18/2025 at 1:11 PM

## 2025-03-18 NOTE — CARE COORDINATION
RW ordered for delivery  [employer]   P   F  Electronically signed by NITZA Ocampo on 3/18/2025 at 2:27 PM

## 2025-03-18 NOTE — PROGRESS NOTES
Nutrition Assessment     Type and Reason for Visit: Reassess    Nutrition Recommendations/Plan:   Follow for advancing diet, po intake,, labs     Malnutrition Assessment:  Malnutrition Status: At risk for malnutrition    Nutrition Assessment:  At present time pt is NPO.  Aware of possible Lexiscan.  Intake on Easy to chew diet has been variable - ranging from 0-25% to %.  New weight is not available.  Accuchelk's 101-143    Estimated Daily Nutrient Needs:  Energy (kcal):  6952-5697 kcals (15-18 kcals/kg) Weight Used for Energy Requirements: Current     Protein (g):  74-123g Weight Used for Protein Requirements: Ideal        Fluid (ml/day):  8986-5481 ml Method Used for Fluid Requirements: 1 ml/kcal    Nutrition Related Findings:   HX-gastric bypass.   Trace BLE edema Wound Type: None    Current Nutrition Therapies:    Diet NPO    Anthropometric Measures:  Height: 170.2 cm (5' 7.01\")  Current Body Wt: 108.9 kg (240 lb 1.3 oz)   BMI: 37.6        Nutrition Diagnosis:   Altered nutrition-related lab values related to renal dysfunction as evidenced by lab values    Nutrition Interventions:   Food and/or Nutrient Delivery: Continue NPO  Nutrition Education/Counseling: No recommendation at this time  Coordination of Nutrition Care: Continue to monitor while inpatient       Goals:  Goals: Meet at least 75% of estimated needs, PO intake 50% or greater  Type of Goal: Continue current goal  Previous Goal Met: Progressing toward Goal(s)    Nutrition Monitoring and Evaluation:   Behavioral-Environmental Outcomes: None Identified  Food/Nutrient Intake Outcomes: Progression of Nutrition, Diet Advancement/Tolerance, Food and Nutrient Intake  Physical Signs/Symptoms Outcomes: Biochemical Data, Chewing or Swallowing, Fluid Status or Edema, Weight, Skin    Discharge Planning:    Too soon to determine     Patsy Mcdaniels, MS, RD, LD  Contact: [phone]

## 2025-03-19 ENCOUNTER — TELEPHONE (OUTPATIENT)
Dept: PRIMARY CARE CLINIC | Age: 75
End: 2025-03-19

## 2025-03-19 NOTE — TELEPHONE ENCOUNTER
Care Transitions Initial Follow Up Call    Outreach made within 2 business days of discharge: Yes    Patient: Leny Stone   Patient : 1950   MRN: [id number]    Reason for Admission: AMS  Discharge Date: 3/18/25       Spoke with: Leny    Discharge department/facility: [facility]     TCM Interactive Patient Contact:  Was patient able to fill all prescriptions: No: No new meds  Was patient instructed to bring all medications to the follow-up visit: Yes  Is patient taking all medications as directed in the discharge summary? Yes  Does patient understand their discharge instructions: Yes  Does patient have questions or concerns that need addressed prior to 7-14 day follow up office visit: no    Additional needs identified to be addressed with provider  No needs identified             Scheduled appointment with PCP within 7-14 days    Spoke briefly with eLny. She states she has no new meds and is taking all meds as instructed. She has an appointment within 48 hours of discharge.     Follow Up  Future Appointments   Date Time Provider Department Center   3/20/2025  1:15 PM [facility]   3/25/2025 10:00 AM Irasema Rey APRN - CNP N [facility] Gastro [facility]   4/15/2025  9:00 AM [facility] ECHO 1 [facility] Rad/Card   4/15/2025 10:30 AM [facility] NM INJECTION ROOM [facility] NUC MED [facility]   4/15/2025 11:00 AM [facility] NM STRESS [facility] NUC MED [facility] Rad[facility]Card   4/15/2025 12:00 PM [facility] NUC MED RM 1 [facility] NUC MED [facility] Rad/Card   2025  9:30 AM Tyrone Ayon MD N [facility] Cardio [facility]   2025  9:30 AM Regina Curtis MD LPS [facility] ECC DEP   2025  9:15 AM Irasema Rey APRN - CNP N [facility] Gastro [facility]   2025  9:30 AM Malou Cuenca APRN N PAD HEMONC [facility]   2025  9:30 AM SCHEDULE, [facility] MED ONC MA [facility] MED ONC [facility]       Regina Fountain MA

## 2025-03-20 ENCOUNTER — OFFICE VISIT (OUTPATIENT)
Dept: INTERNAL MEDICINE | Age: 75
End: 2025-03-20

## 2025-03-20 ENCOUNTER — RESULTS FOLLOW-UP (OUTPATIENT)
Dept: INTERNAL MEDICINE | Age: 75
End: 2025-03-20

## 2025-03-20 VITALS
DIASTOLIC BLOOD PRESSURE: 88 MMHG | HEIGHT: 67 IN | BODY MASS INDEX: 38.14 KG/M2 | OXYGEN SATURATION: 99 % | SYSTOLIC BLOOD PRESSURE: 138 MMHG | WEIGHT: 243 LBS | HEART RATE: 101 BPM

## 2025-03-20 DIAGNOSIS — I48.91 ATRIAL FIBRILLATION WITH RAPID VENTRICULAR RESPONSE (HCC): ICD-10-CM

## 2025-03-20 DIAGNOSIS — D64.9 ANEMIA, UNSPECIFIED TYPE: ICD-10-CM

## 2025-03-20 DIAGNOSIS — E11.9 DIET-CONTROLLED DIABETES MELLITUS (HCC): ICD-10-CM

## 2025-03-20 DIAGNOSIS — N17.9 AKI (ACUTE KIDNEY INJURY): ICD-10-CM

## 2025-03-20 DIAGNOSIS — G47.30 SLEEP APNEA, UNSPECIFIED TYPE: ICD-10-CM

## 2025-03-20 DIAGNOSIS — S30.1XXS ABDOMINAL WALL HEMATOMA, SEQUELA: ICD-10-CM

## 2025-03-20 DIAGNOSIS — R41.82 ALTERED MENTAL STATUS, UNSPECIFIED ALTERED MENTAL STATUS TYPE: Primary | ICD-10-CM

## 2025-03-20 DIAGNOSIS — F33.2 SEVERE EPISODE OF RECURRENT MAJOR DEPRESSIVE DISORDER, WITHOUT PSYCHOTIC FEATURES (HCC): ICD-10-CM

## 2025-03-20 DIAGNOSIS — Z09 HOSPITAL DISCHARGE FOLLOW-UP: ICD-10-CM

## 2025-03-20 DIAGNOSIS — J45.20 MILD INTERMITTENT REACTIVE AIRWAY DISEASE WITHOUT COMPLICATION: ICD-10-CM

## 2025-03-20 LAB
ACANTHOCYTES BLD QL SMEAR: ABNORMAL
ALBUMIN SERPL-MCNC: 3.5 G/DL (ref 3.5–5.2)
ALP SERPL-CCNC: 107 U/L (ref 35–104)
ALT SERPL-CCNC: 27 U/L (ref 10–35)
ANION GAP SERPL CALCULATED.3IONS-SCNC: 10 MMOL/L (ref 8–16)
ANISOCYTOSIS BLD QL SMEAR: ABNORMAL
AST SERPL-CCNC: 47 U/L (ref 10–35)
BASO STIPL BLD QL SMEAR: ABNORMAL
BASOPHILS # BLD: 0 K/UL (ref 0–0.2)
BASOPHILS NFR BLD: 0 % (ref 0–1)
BILIRUB SERPL-MCNC: 0.9 MG/DL (ref 0.2–1.2)
BUN SERPL-MCNC: 18 MG/DL (ref 8–23)
CALCIUM SERPL-MCNC: 9.2 MG/DL (ref 8.8–10.2)
CHLORIDE SERPL-SCNC: 107 MMOL/L (ref 98–107)
CO2 SERPL-SCNC: 23 MMOL/L (ref 22–29)
CREAT SERPL-MCNC: 1.3 MG/DL (ref 0.5–0.9)
EOSINOPHIL # BLD: 0 K/UL (ref 0–0.6)
EOSINOPHIL NFR BLD: 0 % (ref 0–5)
ERYTHROCYTE [DISTWIDTH] IN BLOOD BY AUTOMATED COUNT: 17.3 % (ref 11.5–14.5)
GLUCOSE SERPL-MCNC: 88 MG/DL (ref 70–99)
HCT VFR BLD AUTO: 26.9 % (ref 37–47)
HGB BLD-MCNC: 8.4 G/DL (ref 12–16)
HYPOCHROMIA BLD QL SMEAR: ABNORMAL
IMM GRANULOCYTES # BLD: 0.8 K/UL
LYMPHOCYTES # BLD: 2.6 K/UL (ref 1.1–4.5)
LYMPHOCYTES NFR BLD: 18 % (ref 20–40)
MCH RBC QN AUTO: 30 PG (ref 27–31)
MCHC RBC AUTO-ENTMCNC: 31.2 G/DL (ref 33–37)
MCV RBC AUTO: 96.1 FL (ref 81–99)
MONOCYTES # BLD: 1 K/UL (ref 0–0.9)
MONOCYTES NFR BLD: 7 % (ref 0–10)
NEUTROPHILS # BLD: 11 K/UL (ref 1.5–7.5)
NEUTS BAND NFR BLD MANUAL: 2 % (ref 0–5)
NEUTS SEG NFR BLD: 73 % (ref 50–65)
PLATELET # BLD AUTO: 351 K/UL (ref 130–400)
PLATELET SLIDE REVIEW: ADEQUATE
PMV BLD AUTO: 11.2 FL (ref 9.4–12.3)
POLYCHROMASIA BLD QL SMEAR: ABNORMAL
POTASSIUM SERPL-SCNC: 4.5 MMOL/L (ref 3.5–5.1)
PROT SERPL-MCNC: 7.1 G/DL (ref 6.4–8.3)
RBC # BLD AUTO: 2.8 M/UL (ref 4.2–5.4)
SODIUM SERPL-SCNC: 140 MMOL/L (ref 136–145)
TARGETS BLD QL SMEAR: ABNORMAL
WBC # BLD AUTO: 14.6 K/UL (ref 4.8–10.8)

## 2025-03-22 SDOH — ECONOMIC STABILITY: FOOD INSECURITY: WITHIN THE PAST 12 MONTHS, YOU WORRIED THAT YOUR FOOD WOULD RUN OUT BEFORE YOU GOT MONEY TO BUY MORE.: NEVER TRUE

## 2025-03-22 SDOH — ECONOMIC STABILITY: FOOD INSECURITY: WITHIN THE PAST 12 MONTHS, THE FOOD YOU BOUGHT JUST DIDN'T LAST AND YOU DIDN'T HAVE MONEY TO GET MORE.: NEVER TRUE

## 2025-03-22 ASSESSMENT — PATIENT HEALTH QUESTIONNAIRE - PHQ9
SUM OF ALL RESPONSES TO PHQ QUESTIONS 1-9: 19
1. LITTLE INTEREST OR PLEASURE IN DOING THINGS: NEARLY EVERY DAY
4. FEELING TIRED OR HAVING LITTLE ENERGY: NEARLY EVERY DAY
7. TROUBLE CONCENTRATING ON THINGS, SUCH AS READING THE NEWSPAPER OR WATCHING TELEVISION: NEARLY EVERY DAY
2. FEELING DOWN, DEPRESSED OR HOPELESS: NEARLY EVERY DAY
SUM OF ALL RESPONSES TO PHQ QUESTIONS 1-9: 19
9. THOUGHTS THAT YOU WOULD BE BETTER OFF DEAD, OR OF HURTING YOURSELF: NOT AT ALL
5. POOR APPETITE OR OVEREATING: NEARLY EVERY DAY
SUM OF ALL RESPONSES TO PHQ QUESTIONS 1-9: 19
8. MOVING OR SPEAKING SO SLOWLY THAT OTHER PEOPLE COULD HAVE NOTICED. OR THE OPPOSITE, BEING SO FIGETY OR RESTLESS THAT YOU HAVE BEEN MOVING AROUND A LOT MORE THAN USUAL: SEVERAL DAYS
10. IF YOU CHECKED OFF ANY PROBLEMS, HOW DIFFICULT HAVE THESE PROBLEMS MADE IT FOR YOU TO DO YOUR WORK, TAKE CARE OF THINGS AT HOME, OR GET ALONG WITH OTHER PEOPLE: SOMEWHAT DIFFICULT
SUM OF ALL RESPONSES TO PHQ QUESTIONS 1-9: 19
6. FEELING BAD ABOUT YOURSELF - OR THAT YOU ARE A FAILURE OR HAVE LET YOURSELF OR YOUR FAMILY DOWN: NEARLY EVERY DAY

## 2025-03-22 ASSESSMENT — ENCOUNTER SYMPTOMS
BLOOD IN STOOL: 0
CHEST TIGHTNESS: 0
CONSTIPATION: 0
SINUS PRESSURE: 0
BACK PAIN: 0
SHORTNESS OF BREATH: 0
SINUS PAIN: 0
TROUBLE SWALLOWING: 0
VOICE CHANGE: 0
RHINORRHEA: 0
ABDOMINAL DISTENTION: 0
VOMITING: 0
NAUSEA: 0
STRIDOR: 0
EYE DISCHARGE: 0
WHEEZING: 0
COUGH: 0
COLOR CHANGE: 0
DIARRHEA: 0
ABDOMINAL PAIN: 0
APNEA: 0
EYE ITCHING: 0

## 2025-03-22 ASSESSMENT — COLUMBIA-SUICIDE SEVERITY RATING SCALE - C-SSRS
2. HAVE YOU ACTUALLY HAD ANY THOUGHTS OF KILLING YOURSELF?: NO
1. WITHIN THE PAST MONTH, HAVE YOU WISHED YOU WERE DEAD OR WISHED YOU COULD GO TO SLEEP AND NOT WAKE UP?: NO
6. HAVE YOU EVER DONE ANYTHING, STARTED TO DO ANYTHING, OR PREPARED TO DO ANYTHING TO END YOUR LIFE?: NO

## 2025-03-25 ENCOUNTER — OFFICE VISIT (OUTPATIENT)
Dept: GASTROENTEROLOGY | Age: 75
End: 2025-03-25
Payer: MEDICARE

## 2025-03-25 VITALS
WEIGHT: 238 LBS | HEIGHT: 67 IN | HEART RATE: 76 BPM | SYSTOLIC BLOOD PRESSURE: 136 MMHG | OXYGEN SATURATION: 96 % | DIASTOLIC BLOOD PRESSURE: 80 MMHG | BODY MASS INDEX: 37.35 KG/M2

## 2025-03-25 DIAGNOSIS — Z80.0 FAMILY HISTORY OF COLON CANCER: ICD-10-CM

## 2025-03-25 DIAGNOSIS — Z98.84 HISTORY OF ROUX-EN-Y GASTRIC BYPASS: ICD-10-CM

## 2025-03-25 DIAGNOSIS — D64.9 ANEMIA, UNSPECIFIED TYPE: Primary | ICD-10-CM

## 2025-03-25 DIAGNOSIS — Z87.898 HISTORY OF ULCER DISEASE: ICD-10-CM

## 2025-03-25 DIAGNOSIS — K21.9 GASTROESOPHAGEAL REFLUX DISEASE, UNSPECIFIED WHETHER ESOPHAGITIS PRESENT: ICD-10-CM

## 2025-03-25 PROCEDURE — 1111F DSCHRG MED/CURRENT MED MERGE: CPT

## 2025-03-25 PROCEDURE — 1123F ACP DISCUSS/DSCN MKR DOCD: CPT

## 2025-03-25 PROCEDURE — G8399 PT W/DXA RESULTS DOCUMENT: HCPCS

## 2025-03-25 PROCEDURE — 3075F SYST BP GE 130 - 139MM HG: CPT

## 2025-03-25 PROCEDURE — 3079F DIAST BP 80-89 MM HG: CPT

## 2025-03-25 PROCEDURE — 99214 OFFICE O/P EST MOD 30 MIN: CPT

## 2025-03-25 PROCEDURE — 1090F PRES/ABSN URINE INCON ASSESS: CPT

## 2025-03-25 PROCEDURE — G8427 DOCREV CUR MEDS BY ELIG CLIN: HCPCS

## 2025-03-25 PROCEDURE — 1159F MED LIST DOCD IN RCRD: CPT

## 2025-03-25 PROCEDURE — G8417 CALC BMI ABV UP PARAM F/U: HCPCS

## 2025-03-25 PROCEDURE — 1036F TOBACCO NON-USER: CPT

## 2025-03-25 PROCEDURE — 3017F COLORECTAL CA SCREEN DOC REV: CPT

## 2025-03-25 ASSESSMENT — ENCOUNTER SYMPTOMS
COUGH: 0
VOMITING: 0
DIARRHEA: 0
SHORTNESS OF BREATH: 0
RESPIRATORY NEGATIVE: 1
EYES NEGATIVE: 1
CONSTIPATION: 0
NAUSEA: 0
ABDOMINAL PAIN: 0
TROUBLE SWALLOWING: 0
ALLERGIC/IMMUNOLOGIC NEGATIVE: 1
GASTROINTESTINAL NEGATIVE: 1
CHOKING: 0

## 2025-03-25 NOTE — PROGRESS NOTES
Subjective:     Patient ID: Leny Stone is a 74 y.o. female  PCP: Regina Curtis MD  Referring Provider: No ref. provider found    HPI  Patient presents to the office today with the following complaints: Other (HFU)      Patient seen in the office today for hospital follow up. She was admitted on 3/6/25-3/18/25. She went to the ER for altered mental status. The daughter states that while in the hospital she had been off of her medications for a few days and started getting better. They had decided that she was on too many medications and to try to wean her off of some and see how she did. She denies nausea or vomiting today. She states she is having a bowel movement 2-3 times a day and sometimes notes bright red blood. She is not sure if it is vaginal or rectal. She denies abdominal pain. She is anemic at this time and did require a blood transfusion while in the hospital. I will order lab work to be done next week just to trend her blood counts. They agreed to this. She is scheduled for an EGD and colonoscopy on 5/7/25 with Dr Horvath. We will keep with this appointment and ok to stop the Reglan that she was taking. The daughter states she is not eating well and agreed to continue with the EGD and hold off on the gastric emptying study that was ordered during her last visit with me.     Lab Results   Component Value Date    WBC 14.6 (H) 03/20/2025    HGB 8.4 (L) 03/20/2025    HCT 26.9 (L) 03/20/2025    MCV 96.1 03/20/2025     03/20/2025    LYMPHOPCT 18.0 (L) 03/20/2025    RBC 2.80 (L) 03/20/2025    MCH 30.0 03/20/2025    MCHC 31.2 (L) 03/20/2025    RDW 17.3 (H) 03/20/2025      Lab Results   Component Value Date     03/20/2025    K 4.5 03/20/2025     03/20/2025    CO2 23 03/20/2025    BUN 18 03/20/2025    CREATININE 1.3 (H) 03/20/2025    GLUCOSE 88 03/20/2025    CALCIUM 9.2 03/20/2025    BILITOT 0.9 03/20/2025    ALKPHOS 107 (H) 03/20/2025    AST 47 (H) 03/20/2025    ALT 27 03/20/2025    LABGLOM

## 2025-03-30 LAB — INR BLD: 1.1

## 2025-03-31 ENCOUNTER — ANTI-COAG VISIT (OUTPATIENT)
Dept: PRIMARY CARE CLINIC | Age: 75
End: 2025-03-31
Payer: MEDICARE

## 2025-03-31 DIAGNOSIS — Z79.01 ANTICOAGULATED ON COUMADIN: Primary | ICD-10-CM

## 2025-03-31 PROCEDURE — 93793 ANTICOAG MGMT PT WARFARIN: CPT | Performed by: INTERNAL MEDICINE

## 2025-03-31 NOTE — PROGRESS NOTES
HOME MONITORING REPORT    INR today:   Results for orders placed or performed in visit on 03/31/25   Protime-INR   Result Value Ref Range    INR 1.10        INR Goal: 2.0-3.0    Dosing Plan  As of 3/31/2025      TTR:  32.6% (6.8 y)   Full warfarin instructions:  3/31: 7.5 mg; Otherwise 2.5 mg every Tue, Sat; 5 mg all other days            Patient called in #5 results of 1.1 to [facility]INR. These were called into me as critical values. Per patient only the one from 3/30/25 was an actual result, she just sent the other ones in to fill in the dates. I advised her not to call in fake results to [facility]IN[facility].     PLAN: Advised patient/caregiver to increase her dose tonight to 7.5 mg, then continue current dose and not miss any doses. Recheck on Thursday. .  Patient/Caregiver voiced understanding    Electronically signed by Regina Curtis MD on 3/31/2025 at 3:45 PM

## 2025-04-01 ENCOUNTER — TELEPHONE (OUTPATIENT)
Dept: GASTROENTEROLOGY | Age: 75
End: 2025-04-01

## 2025-04-01 NOTE — TELEPHONE ENCOUNTER
----- Message from ANUPAMA LAMA MA sent at 3/25/2025  1:12 PM CDT -----  Regarding: RE: checkout note 3/25/25    ----- Message -----  From: Ning Santos  Sent: 3/25/2025  11:14 AM CDT  To: Felipa Tubbs MA  Subject: checkout note 3/25/25                            Lab work in a week

## 2025-04-01 NOTE — TELEPHONE ENCOUNTER
4.1.25  Reminder call to pt to have her blood work done this wk for darryl Trimble    Pt voiced understanding.

## 2025-04-02 DIAGNOSIS — Z98.84 HISTORY OF ROUX-EN-Y GASTRIC BYPASS: ICD-10-CM

## 2025-04-02 DIAGNOSIS — D64.9 ANEMIA, UNSPECIFIED TYPE: ICD-10-CM

## 2025-04-02 DIAGNOSIS — J45.20 MILD INTERMITTENT REACTIVE AIRWAY DISEASE WITHOUT COMPLICATION: ICD-10-CM

## 2025-04-02 LAB
ALBUMIN SERPL-MCNC: 3.9 G/DL (ref 3.5–5.2)
ALP SERPL-CCNC: 78 U/L (ref 35–104)
ALT SERPL-CCNC: <5 U/L (ref 10–35)
ANION GAP SERPL CALCULATED.3IONS-SCNC: 9 MMOL/L (ref 8–16)
AST SERPL-CCNC: 17 U/L (ref 10–35)
BASOPHILS # BLD: 0 K/UL (ref 0–0.2)
BASOPHILS NFR BLD: 1.1 % (ref 0–1)
BILIRUB SERPL-MCNC: 0.8 MG/DL (ref 0.2–1.2)
BUN SERPL-MCNC: 16 MG/DL (ref 8–23)
CALCIUM SERPL-MCNC: 9.2 MG/DL (ref 8.8–10.2)
CHLORIDE SERPL-SCNC: 108 MMOL/L (ref 98–107)
CO2 SERPL-SCNC: 22 MMOL/L (ref 22–29)
CREAT SERPL-MCNC: 1.2 MG/DL (ref 0.5–0.9)
EOSINOPHIL # BLD: 0.1 K/UL (ref 0–0.6)
EOSINOPHIL NFR BLD: 3.2 % (ref 0–5)
ERYTHROCYTE [DISTWIDTH] IN BLOOD BY AUTOMATED COUNT: 19.7 % (ref 11.5–14.5)
GLUCOSE SERPL-MCNC: 97 MG/DL (ref 70–99)
HCT VFR BLD AUTO: 31.7 % (ref 37–47)
HGB BLD-MCNC: 9.8 G/DL (ref 12–16)
IMM GRANULOCYTES # BLD: 0 K/UL
LYMPHOCYTES # BLD: 1.1 K/UL (ref 1.1–4.5)
LYMPHOCYTES NFR BLD: 29.3 % (ref 20–40)
MCH RBC QN AUTO: 30.8 PG (ref 27–31)
MCHC RBC AUTO-ENTMCNC: 30.9 G/DL (ref 33–37)
MCV RBC AUTO: 99.7 FL (ref 81–99)
MONOCYTES # BLD: 0.5 K/UL (ref 0–0.9)
MONOCYTES NFR BLD: 12.9 % (ref 0–10)
NEUTROPHILS # BLD: 2 K/UL (ref 1.5–7.5)
NEUTS SEG NFR BLD: 53.2 % (ref 50–65)
PLATELET # BLD AUTO: 231 K/UL (ref 130–400)
PMV BLD AUTO: 11.7 FL (ref 9.4–12.3)
POTASSIUM SERPL-SCNC: 4.1 MMOL/L (ref 3.5–5.1)
PROT SERPL-MCNC: 7.5 G/DL (ref 6.4–8.3)
RBC # BLD AUTO: 3.18 M/UL (ref 4.2–5.4)
SODIUM SERPL-SCNC: 139 MMOL/L (ref 136–145)
WBC # BLD AUTO: 3.8 K/UL (ref 4.8–10.8)

## 2025-04-03 ENCOUNTER — ANTI-COAG VISIT (OUTPATIENT)
Dept: PRIMARY CARE CLINIC | Age: 75
End: 2025-04-03
Payer: MEDICARE

## 2025-04-03 DIAGNOSIS — Z79.01 ANTICOAGULATED ON COUMADIN: Primary | ICD-10-CM

## 2025-04-03 LAB — INR BLD: 1.4

## 2025-04-03 PROCEDURE — 93793 ANTICOAG MGMT PT WARFARIN: CPT | Performed by: INTERNAL MEDICINE

## 2025-04-03 NOTE — PROGRESS NOTES
HOME MONITORING REPORT    INR today:   Results for orders placed or performed in visit on 04/03/25   Protime-INR   Result Value Ref Range    INR 1.40        INR Goal: 2.0-3.0    Dosing Plan  As of 4/3/2025      TTR:  32.5% (6.8 y)   Full warfarin instructions:  4/3: 10 mg; Otherwise 2.5 mg every Tue, Sat; 5 mg all other days                PLAN: Advised patient/caregiver to increase her dose tonight to 10 mg, then continue current dose and recheck in one week.  Patient/Caregiver voiced understanding    Electronically signed by Regina Curtis MD on 4/3/2025 at 11:36 AM

## 2025-04-04 ENCOUNTER — RESULTS FOLLOW-UP (OUTPATIENT)
Dept: GASTROENTEROLOGY | Age: 75
End: 2025-04-04

## 2025-04-05 DIAGNOSIS — E03.9 HYPOTHYROIDISM, UNSPECIFIED TYPE: ICD-10-CM

## 2025-04-07 RX ORDER — LEVOTHYROXINE SODIUM 100 UG/1
100 TABLET ORAL DAILY
Qty: 60 TABLET | Refills: 5 | OUTPATIENT
Start: 2025-04-07

## 2025-04-09 ENCOUNTER — ANTI-COAG VISIT (OUTPATIENT)
Dept: PRIMARY CARE CLINIC | Age: 75
End: 2025-04-09
Payer: MEDICARE

## 2025-04-09 DIAGNOSIS — Z79.01 ANTICOAGULATED ON COUMADIN: Primary | ICD-10-CM

## 2025-04-09 LAB — INR BLD: 2.3

## 2025-04-09 PROCEDURE — 93793 ANTICOAG MGMT PT WARFARIN: CPT | Performed by: INTERNAL MEDICINE

## 2025-04-09 NOTE — PROGRESS NOTES
HOME MONITORING REPORT    INR today:   Results for orders placed or performed in visit on 04/09/25   Protime-INR   Result Value Ref Range    INR 2.30        INR Goal: 2.0-3.0    Dosing Plan  As of 4/9/2025      TTR:  32.5% (6.8 y)   Full warfarin instructions:  2.5 mg every Tue, Sat; 5 mg all other days              Patient told me today she has been taking 5 mg a day.     PLAN: Advised patient/caregiver to continue current dose and recheck in one week.  Patient/Caregiver voiced understanding      Electronically signed by Regina Curtis MD on 4/9/2025 at 11:51 AM

## 2025-04-16 ENCOUNTER — ANTI-COAG VISIT (OUTPATIENT)
Dept: PRIMARY CARE CLINIC | Age: 75
End: 2025-04-16
Payer: MEDICARE

## 2025-04-16 DIAGNOSIS — Z79.01 ANTICOAGULATED ON COUMADIN: Primary | ICD-10-CM

## 2025-04-16 LAB — INR BLD: 1.9

## 2025-04-16 PROCEDURE — 93793 ANTICOAG MGMT PT WARFARIN: CPT | Performed by: INTERNAL MEDICINE

## 2025-04-16 NOTE — PROGRESS NOTES
HOME MONITORING REPORT    INR today:   Results for orders placed or performed in visit on 04/16/25   Protime-INR   Result Value Ref Range    INR 1.90        INR Goal: 2.0-3.0    Dosing Plan  As of 4/16/2025      TTR:  32.6% (6.8 y)   Full warfarin instructions:  4/19: 5 mg; Otherwise 2.5 mg every Tue, Sat; 5 mg all other days                PLAN: Advised patient/caregiver to increase her dose tonight to 7.5 mg then continue current dose and recheck in one week.  Patient/Caregiver voiced understanding    Electronically signed by Regina Curtis MD on 4/16/2025 at 12:10 PM

## 2025-04-23 ENCOUNTER — ANTI-COAG VISIT (OUTPATIENT)
Dept: PRIMARY CARE CLINIC | Age: 75
End: 2025-04-23
Payer: MEDICARE

## 2025-04-23 DIAGNOSIS — D50.9 IRON DEFICIENCY ANEMIA, UNSPECIFIED IRON DEFICIENCY ANEMIA TYPE: ICD-10-CM

## 2025-04-23 DIAGNOSIS — Z79.01 ANTICOAGULATED ON COUMADIN: Primary | ICD-10-CM

## 2025-04-23 LAB — INR BLD: 1.8

## 2025-04-23 PROCEDURE — 93793 ANTICOAG MGMT PT WARFARIN: CPT | Performed by: INTERNAL MEDICINE

## 2025-04-23 RX ORDER — QUINIDINE GLUCONATE 324 MG
240 TABLET, EXTENDED RELEASE ORAL
Qty: 180 TABLET | Refills: 1 | Status: SHIPPED | OUTPATIENT
Start: 2025-04-23

## 2025-04-23 NOTE — PROGRESS NOTES
HOME MONITORING REPORT    INR today:   Results for orders placed or performed in visit on 04/23/25   Protime-INR   Result Value Ref Range    INR 1.80        INR Goal: 2.0-3.0    Dosing Plan  As of 4/23/2025      TTR:  32.5% (6.8 y)   Full warfarin instructions:  4/23: 7.5 mg; 4/29: 5 mg; Otherwise 2.5 mg every Tue; 5 mg all other days                PLAN: Advised patient/caregiver to increase her dose tonight and Saturday night to 7.5 mg. 5 mg all other days. Recheck in one week.  Patient/Caregiver voiced understanding    Electronically signed by Regina Curtis MD on 4/24/2025 at 4:54 AM

## 2025-04-24 ENCOUNTER — TELEPHONE (OUTPATIENT)
Dept: NEUROLOGY | Age: 75
End: 2025-04-24

## 2025-04-24 NOTE — TELEPHONE ENCOUNTER
Leny returned call to get an appt with Albina Muñiz to discuss inspire.  I could not find an appt until September.    Please call her between 8-12 Monday, Wednesday and Friday.    Thank you

## 2025-04-26 DIAGNOSIS — E87.6 HYPOKALEMIA: ICD-10-CM

## 2025-04-28 ENCOUNTER — OFFICE VISIT (OUTPATIENT)
Dept: CARDIOLOGY CLINIC | Age: 75
End: 2025-04-28

## 2025-04-28 VITALS
HEIGHT: 67 IN | WEIGHT: 236 LBS | BODY MASS INDEX: 37.04 KG/M2 | DIASTOLIC BLOOD PRESSURE: 98 MMHG | HEART RATE: 71 BPM | SYSTOLIC BLOOD PRESSURE: 146 MMHG

## 2025-04-28 DIAGNOSIS — I48.0 PAROXYSMAL ATRIAL FIBRILLATION (HCC): Primary | ICD-10-CM

## 2025-04-28 DIAGNOSIS — I10 PRIMARY HYPERTENSION: ICD-10-CM

## 2025-04-28 DIAGNOSIS — Z79.899 LONG TERM CURRENT USE OF ANTIARRHYTHMIC DRUG: ICD-10-CM

## 2025-04-28 DIAGNOSIS — R07.9 CHEST PAIN, UNSPECIFIED TYPE: ICD-10-CM

## 2025-04-28 DIAGNOSIS — Z79.01 CHRONIC ANTICOAGULATION: ICD-10-CM

## 2025-04-28 DIAGNOSIS — R06.02 SHORTNESS OF BREATH: ICD-10-CM

## 2025-04-28 DIAGNOSIS — Z79.899 ON AMIODARONE THERAPY: ICD-10-CM

## 2025-04-28 DIAGNOSIS — Z95.0 PACEMAKER: ICD-10-CM

## 2025-04-28 RX ORDER — POTASSIUM CHLORIDE 750 MG/1
10 TABLET, EXTENDED RELEASE ORAL DAILY
Qty: 90 TABLET | Refills: 1 | Status: SHIPPED | OUTPATIENT
Start: 2025-04-28

## 2025-04-28 ASSESSMENT — ENCOUNTER SYMPTOMS
VOMITING: 0
SHORTNESS OF BREATH: 0
GASTROINTESTINAL NEGATIVE: 1
DIARRHEA: 0
RESPIRATORY NEGATIVE: 1
EYES NEGATIVE: 1
NAUSEA: 0
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
## 2025-04-30 ENCOUNTER — ANTI-COAG VISIT (OUTPATIENT)
Dept: PRIMARY CARE CLINIC | Age: 75
End: 2025-04-30
Payer: MEDICARE

## 2025-04-30 DIAGNOSIS — D50.9 IRON DEFICIENCY ANEMIA, UNSPECIFIED IRON DEFICIENCY ANEMIA TYPE: ICD-10-CM

## 2025-04-30 DIAGNOSIS — Z79.01 ANTICOAGULATED ON COUMADIN: Primary | ICD-10-CM

## 2025-04-30 LAB — INR BLD: 1.9

## 2025-04-30 PROCEDURE — 93793 ANTICOAG MGMT PT WARFARIN: CPT | Performed by: INTERNAL MEDICINE

## 2025-04-30 RX ORDER — QUINIDINE GLUCONATE 324 MG
240 TABLET, EXTENDED RELEASE ORAL
Qty: 180 TABLET | Refills: 1 | Status: SHIPPED | OUTPATIENT
Start: 2025-04-30

## 2025-04-30 NOTE — PROGRESS NOTES
HOME MONITORING REPORT    INR today:   Results for orders placed or performed in visit on 04/30/25   Protime-INR   Result Value Ref Range    INR 1.90        INR Goal: 2.0-3.0    Dosing Plan  As of 4/30/2025      TTR:  32.4% (6.8 y)   Full warfarin instructions:  4/30: 7.5 mg; Otherwise 2.5 mg every Tue; 5 mg all other days                PLAN: Advised patient/caregiver to increase her dose tonight to 7.5 mg. Then continue current dose and recheck in one week.  Patient/Caregiver voiced understanding    Electronically signed by Regina Curtis MD on 5/1/2025 at 5:01 AM

## 2025-05-02 ENCOUNTER — TELEPHONE (OUTPATIENT)
Dept: GASTROENTEROLOGY | Age: 75
End: 2025-05-02

## 2025-05-02 NOTE — TELEPHONE ENCOUNTER
Patient: Leny Stone    YOB: 1950      Clearance was received on May 2, 2025.    for Endoscopy / Colonoscopy scheduled for: 5/7/25    Patient may discontinue the use of COUMADIN for  5  days prior to the procedure.    IS Lovenox required:  NO    PATIENT NOTIFIED ON:  5/2/25      Clearance scanned into chart

## 2025-05-05 DIAGNOSIS — F32.A ANXIETY AND DEPRESSION: ICD-10-CM

## 2025-05-05 DIAGNOSIS — F41.9 ANXIETY AND DEPRESSION: ICD-10-CM

## 2025-05-05 RX ORDER — ESCITALOPRAM OXALATE 20 MG/1
20 TABLET ORAL DAILY
Qty: 90 TABLET | Refills: 1 | Status: SHIPPED | OUTPATIENT
Start: 2025-05-05

## 2025-05-06 ENCOUNTER — ANESTHESIA EVENT (OUTPATIENT)
Dept: ENDOSCOPY | Age: 75
End: 2025-05-06
Payer: MEDICARE

## 2025-05-06 ASSESSMENT — ENCOUNTER SYMPTOMS: SHORTNESS OF BREATH: 1

## 2025-05-06 NOTE — ANESTHESIA PRE PROCEDURE
Department of Anesthesiology  Preprocedure Note       Name:  Leny Stone   Age:  74 y.o.  :  1950                                          MRN:  [id number]         Date:  2025      Surgeon: Surgeon(s):  Lukasz Horvath MD    Procedure: Procedure(s):  ESOPHAGOGASTRODUODENOSCOPY DILATION SAVORY  COLORECTAL CANCER SCREENING, NOT HIGH RISK    Medications prior to admission:   Prior to Admission medications    Medication Sig Start Date End Date Taking? Authorizing Provider   escitalopram (LEXAPRO) 20 MG tablet TAKE 1 TABLET EVERY DAY 25   Regina Curtis MD   ferrous gluconate (FERGON) 240 (27 Fe) MG tablet Take 1 tablet by mouth 2 times daily (before meals) 25   Malou Cuenca APRN   potassium chloride (KLOR-CON) 10 MEQ extended release tablet TAKE 1 TABLET EVERY DAY 25   Regina Curtis MD   tiotropium-olodaterol (STIOLTO RESPIMAT) 2.5-2.5 MCG/ACT AERS Inhale 2 puffs into the lungs daily 4/3/25   Regina Curtis MD   nitroGLYCERIN (NITROSTAT) 0.4 MG SL tablet Place 1 tablet under the tongue every 5 minutes as needed for Chest pain up to max of 3 total doses. If no relief after 1 dose, call 911. 3/18/25   Bozena Ramirez APRN   levothyroxine (SYNTHROID) 100 MCG tablet Take 1 tablet by mouth daily 25   Regina Curtis MD   warfarin (COUMADIN) 5 MG tablet TAKE 1 TABLET EVERY DAY 24   Regina Curtis MD   sucralfate (CARAFATE) 1 GM tablet TAKE 1 TABLET FOUR TIMES DAILY 24   Regina Curtis MD   amiodarone (CORDARONE) 200 MG tablet Take 0.5 tablets by mouth daily 24   Itzel Blunt APRN   metoprolol tartrate (LOPRESSOR) 25 MG tablet Take 1 tablet by mouth 2 times daily 10/18/24   Regina Curtis MD   bumetanide (BUMEX) 1 MG tablet TAKE 1 TABLET EVERY DAY 24   Regina Curtis MD   pantoprazole (PROTONIX) 40 MG tablet TAKE 1 TABLET TWICE DAILY WITH MEALS 24   Regina Curtis MD   calcitRIOL (ROCALTROL) 0.25 MCG capsule TAKE 1 CAPSULE EVERY

## 2025-05-07 ENCOUNTER — ANCILLARY PROCEDURE (OUTPATIENT)
Dept: ENDOSCOPY | Age: 75
End: 2025-05-07
Attending: INTERNAL MEDICINE
Payer: MEDICARE

## 2025-05-07 ENCOUNTER — ANESTHESIA (OUTPATIENT)
Dept: ENDOSCOPY | Age: 75
End: 2025-05-07
Payer: MEDICARE

## 2025-05-07 ENCOUNTER — HOSPITAL ENCOUNTER (OUTPATIENT)
Age: 75
Setting detail: OUTPATIENT SURGERY
Discharge: HOME | End: 2025-05-07
Attending: INTERNAL MEDICINE | Admitting: INTERNAL MEDICINE
Payer: MEDICARE

## 2025-05-07 VITALS
WEIGHT: 235 LBS | TEMPERATURE: 97.2 F | OXYGEN SATURATION: 97 % | HEART RATE: 62 BPM | SYSTOLIC BLOOD PRESSURE: 120 MMHG | BODY MASS INDEX: 36.88 KG/M2 | HEIGHT: 67 IN | DIASTOLIC BLOOD PRESSURE: 73 MMHG | RESPIRATION RATE: 18 BRPM

## 2025-05-07 DIAGNOSIS — Z98.84 HX OF GASTRIC BYPASS: ICD-10-CM

## 2025-05-07 DIAGNOSIS — R13.19 ESOPHAGEAL DYSPHAGIA: ICD-10-CM

## 2025-05-07 DIAGNOSIS — R10.13 EPIGASTRIC PAIN: ICD-10-CM

## 2025-05-07 PROCEDURE — 3609012700 HC EGD DILATION SAVORY: Performed by: INTERNAL MEDICINE

## 2025-05-07 PROCEDURE — 7100000011 HC PHASE II RECOVERY - ADDTL 15 MIN: Performed by: INTERNAL MEDICINE

## 2025-05-07 PROCEDURE — 3609027000 HC COLONOSCOPY: Performed by: INTERNAL MEDICINE

## 2025-05-07 PROCEDURE — 6360000002 HC RX W HCPCS: Performed by: NURSE ANESTHETIST, CERTIFIED REGISTERED

## 2025-05-07 PROCEDURE — 7100000010 HC PHASE II RECOVERY - FIRST 15 MIN: Performed by: INTERNAL MEDICINE

## 2025-05-07 PROCEDURE — 43239 EGD BIOPSY SINGLE/MULTIPLE: CPT | Performed by: INTERNAL MEDICINE

## 2025-05-07 PROCEDURE — 3700000000 HC ANESTHESIA ATTENDED CARE: Performed by: INTERNAL MEDICINE

## 2025-05-07 PROCEDURE — 88305 TISSUE EXAM BY PATHOLOGIST: CPT | Performed by: PATHOLOGY

## 2025-05-07 PROCEDURE — 2580000003 HC RX 258: Performed by: ANESTHESIOLOGY

## 2025-05-07 PROCEDURE — 88342 IMHCHEM/IMCYTCHM 1ST ANTB: CPT

## 2025-05-07 PROCEDURE — 3609012400 HC EGD TRANSORAL BIOPSY SINGLE/MULTIPLE: Performed by: INTERNAL MEDICINE

## 2025-05-07 PROCEDURE — 3700000001 HC ADD 15 MINUTES (ANESTHESIA): Performed by: INTERNAL MEDICINE

## 2025-05-07 PROCEDURE — 88305 TISSUE EXAM BY PATHOLOGIST: CPT

## 2025-05-07 PROCEDURE — 2709999900 HC NON-CHARGEABLE SUPPLY: Performed by: INTERNAL MEDICINE

## 2025-05-07 PROCEDURE — 43248 EGD GUIDE WIRE INSERTION: CPT | Performed by: INTERNAL MEDICINE

## 2025-05-07 PROCEDURE — 45378 DIAGNOSTIC COLONOSCOPY: CPT | Performed by: INTERNAL MEDICINE

## 2025-05-07 RX ORDER — PROPOFOL 10 MG/ML
INJECTION, EMULSION INTRAVENOUS
Status: DISCONTINUED | OUTPATIENT
Start: 2025-05-07 | End: 2025-05-07 | Stop reason: SDUPTHER

## 2025-05-07 RX ORDER — LIDOCAINE HYDROCHLORIDE 10 MG/ML
INJECTION, SOLUTION INFILTRATION; PERINEURAL
Status: DISCONTINUED | OUTPATIENT
Start: 2025-05-07 | End: 2025-05-07 | Stop reason: SDUPTHER

## 2025-05-07 RX ORDER — SODIUM CHLORIDE, SODIUM LACTATE, POTASSIUM CHLORIDE, CALCIUM CHLORIDE 600; 310; 30; 20 MG/100ML; MG/100ML; MG/100ML; MG/100ML
INJECTION, SOLUTION INTRAVENOUS CONTINUOUS
Status: DISCONTINUED | OUTPATIENT
Start: 2025-05-07 | End: 2025-05-07 | Stop reason: HOSPADM

## 2025-05-07 RX ADMIN — SODIUM CHLORIDE, SODIUM LACTATE, POTASSIUM CHLORIDE, AND CALCIUM CHLORIDE: .6; .31; .03; .02 INJECTION, SOLUTION INTRAVENOUS at 08:24

## 2025-05-07 RX ADMIN — LIDOCAINE HYDROCHLORIDE 40 MG: 10 INJECTION, SOLUTION INFILTRATION; PERINEURAL at 08:34

## 2025-05-07 RX ADMIN — PROPOFOL 270 MG: 10 INJECTION, EMULSION INTRAVENOUS at 08:34

## 2025-05-07 ASSESSMENT — PAIN - FUNCTIONAL ASSESSMENT
PAIN_FUNCTIONAL_ASSESSMENT: NONE - DENIES PAIN
PAIN_FUNCTIONAL_ASSESSMENT: NONE - DENIES PAIN
PAIN_FUNCTIONAL_ASSESSMENT: 0-10

## 2025-05-07 NOTE — DISCHARGE INSTRUCTIONS
RECOMMENDATIONS:    1.  Await path results  2.  NSAID avoidance   3.  Continue iron supplementation.   4.  Given age and lack of polyps, I would not recommend repeat screening colonoscopy      Upper GI Endoscopy and Colonoscopy: What to Expect at Home  Your Recovery    After an upper GI endoscopy, you may have a sore throat for a day or two after the test.    After a colonoscopy you may be bloated or have gas pains. You may need to pass gas. If a biopsy was done or a polyp was removed, you may have streaks of blood in your stool (feces) for a few days. Problems such as heavy rectal bleeding may not occur until several weeks after the test. This isn't common. But it can happen after polyps are removed.    How can you care for yourself at home?  Activity   Rest as much as you need to after you go home.  You should be able to go back to your usual activities the day after the test.  You should not drive or operate equipment for the remainder of today.    Diet   Follow your doctor's directions for eating after the test.  Unless your doctor has told you not to, drink plenty of fluids. This helps to replace the fluids that were lost during the colon prep.  Do not drink alcohol for 24 hours    Medications   If you have a sore throat the day after the test, use an over-the-counter spray to numb your throat.  If polyps were removed or biopsies removed, your doctor may tell you to continue holding your anticoagulants or NSAIDS. Check with your doctor to see when you can resume these types of medications.       Follow-up care is a key part of your treatment and safety. Be sure to make and go to all appointments, and call your doctor if you are having problems. It's also a good idea to know your test results and keep a list of the medicines you take.  When should you call for help?   Call 911 anytime you think you may need emergency care. For example, call if:    You passed out (lost consciousness).     You have trouble

## 2025-05-07 NOTE — OP NOTE
Endoscopic Procedure Note    Patient: Leny Stone : 1950  [facility] Rec#: [id number] Acc#: [id number]     Primary Care Provider Regina Curtis MD  Referring Provider:     Endoscopist: Lukasz Horvath MD    Date of Procedure:  2025    Procedure:   1. EGD with biopsy  2. EGD with dilation to 54F    Indications:   1. Dysphagia   2. Anemia     Anesthesia:  Sedation was administered by anesthesia who monitored the patient during the procedure.    Estimated Blood Loss: minimal    Procedure:   After reviewing the patient's chart and obtaining informed consent, the patient was placed in the left lateral decubitus position.  A forward-viewing Olympus endoscope was lubricated and inserted through the mouth into the oropharynx. Under direct visualization, the upper esophagus was intubated. The scope was advanced to the level of the third portion of duodenum. Scope was slowly withdrawn with careful inspection of the mucosal surfaces. The scope was retroflexed for inspection of the gastric fundus and incisura. Findings and maneuvers are listed in impression below. The patient tolerated the procedure well. The scope was removed. There were no immediate complications.    Findings:   Esophagus: abnormal: in the distal esophagus, there was a benign appearing stricture noted. Dilated.  A guidewire was placed through the endoscope and the endoscope was removed.  A 53 Citizen of [place] savory dilator was advanced down the length of the esophagus used a fixed-point wire technique. The dilator and guidewire were removed.  The patient tolerated the procedure well.      There is a small hiatal hernia present.      Stomach:  abnormal: evidence of previous RYGB surgery. No ulcerations or bleeding source noted   Small Bowel: normal      IMPRESSION:  1. Esophageal dysphagia   2. No cause for anemia noted other than hx of RYGB and resultant duodenal bypass.      RECOMMENDATIONS:    1.  Await path results  2.  NSAID avoidance   3.  Continue iron  avoidance   3.  Continue iron supplementation.     The results were discussed with the patient and family.  A copy of the images obtained were given to the patient.     Lukasz Horvath MD  5/7/2025  8:56 AM

## 2025-05-07 NOTE — OP NOTE
Patient: Leny Stone : 1950  [facility] Rec#: [id number] Acc#: [id number]   Primary Care Provider Regina Curtis MD    Date of Procedure:  2025    Endoscopist: Lukasz Horvath MD    Referring Provider: Regina Curtis MD,     Operation Performed: Colonoscopy     Indications: anemia    Anesthesia:  Sedation was administered by anesthesia who monitored the patient during the procedure.    I met with Leny Stone prior to procedure. We discussed the procedure itself, and I have discussed the risks of endoscopy (including-- but not limited to-- pain, discomfort, bleeding potentially requiring second endoscopic procedure and/or blood transfusion, organ perforation requiring operative repair, damage to organs near the colon, infection, aspiration, cardiopulmonary/allergic reaction), benefits, indications to endoscopy. Additionally, we discussed options other than colonoscopy. The patient expressed understanding. All questions answered. The patient decided to proceed with the procedure.  Signed informed consent was placed on the chart.    Blood Loss: minimal    Withdrawal time: n/a  Bowel Prep: adequate     Complications: no immediate complications    DESCRIPTION OF PROCEDURE:     A time out was performed. After written informed consent was obtained, the patient was placed in the left lateral position.     The perianal area was inspected, and a digital rectal exam was performed. A rectal exam was performed: normal tone, no palpable lesions. At this point, a forward viewing Olympus colonoscope was inserted into the anus and carefully advanced to the cecum.  The cecum was identified by the ileocecal valve and the appendiceal orifice. The colonoscope was then slowly withdrawn with careful inspection of the mucosa in a linear and circumferential fashion. The scope was retroflexed in the rectum. Suction was utilized during the procedure to remove as much air as possible from the bowel. The colonoscope was removed from

## 2025-05-07 NOTE — H&P
Patient Name: Leny Stone  : 1950  MRN: [id number]  DATE: 25    Allergies:   Allergies   Allergen Reactions    Insect Extract Anaphylaxis, Shortness Of Breath and Swelling     Bee stings    Morphine     Lactose Intolerance (Gi) Diarrhea    Lortab [Hydrocodone-Acetaminophen] Nausea And Vomiting     Sweats, weak, nausea and vomiting    Other Nausea And Vomiting     Opiates------sweating, weak        Oxycodone-Acetaminophen Nausea And Vomiting     Sweating and vomiting     Prednisone Other (See Comments)     Headache and upset stomach    Tramadol Nausea And Vomiting     Other reaction(s): Vomiting    Ultram [Tramadol Hcl] Nausea And Vomiting     Sweating, weak, nausea and vomiting      Zanaflex [Tizanidine Hcl] Other (See Comments)     Passed out lost control of body functions        ENDOSCOPY  History and Physical    Procedure:    [x] Diagnostic Colonoscopy       [] Screening Colonoscopy  [x] EGD      [] ERCP      [] EUS       [] Other    [x] Previous office notes/History and Physical reviewed from the patients chart. Please see EMR for further details of HPI. I have examined the patient's status immediately prior to the procedure and:      Indications/HPI:    []Abdominal Pain   []Barretts  []Screening/Surveillance   []History of Polyps  []Dysphagia            [] +Cologard/DNA testing  []Abnormal Imaging              []EOE Hx              [] Family Hx of CRC/Polyps  [x]Anemia                            []Food Impaction       []Recent Poor Prep  []GI Bleed             []Lymphadenopathy  []History of Polyps  []Change in bowel habits []Heartburn/Reflux  []Cancer- GI/Lung  []Chest Pain - Non Cardiac []Heme (+) Stool []Ulcers  []Constipation  []Hemoptysis  []Incontinence    []Diarrhea  []Hypoxemia  []Rectal Bleed (BRBPR)  []Nausea/Vomiting   [] Varices  []Crohns/Colitis  []Pancreatic Cyst   [] Cirrhosis   []Pancreatitis    []Abnormal MRCP  []Elevated LFT [] Stent Removal, Previous ERCP  []Other:  forearm    JOINT REPLACEMENT      KNEE ARTHROPLASTY  07/2022, 07/2018    OTHER SURGICAL HISTORY      IVC filter    PACEMAKER INSERTION      PACEMAKER PLACEMENT      medtronic    AR ARTHRP KNE CONDYLE&PLATU MEDIAL&LAT COMPARTMENTS Right 03/26/2018    TOTAL KNEE REPLACEMENT COMPLEX PRIMARY performed by Fernando Coy MD at [facility] OR    SMALL INTESTINE SURGERY      SMALL INTESTINE SURGERY      SMALL INTESTINE SURGERY      SPLENECTOMY      KRISTEL AND BSO (CERVIX REMOVED) Bilateral 1974    age 24    TONSILLECTOMY AND ADENOIDECTOMY      TOTAL KNEE ARTHROPLASTY Left 07/19/2022    UPPER GASTROINTESTINAL ENDOSCOPY  12/2011    gerd s/p gastric bypass    UPPER GASTROINTESTINAL ENDOSCOPY  02/2014    normal s/p gastric bypass    UPPER GASTROINTESTINAL ENDOSCOPY  02/2010    biopsy neg Barretts, chronic reflux esophagitis s/p gastric bypass    UPPER GASTROINTESTINAL ENDOSCOPY  07/2006    unremarkable s/p gastric bypass    UPPER GASTROINTESTINAL ENDOSCOPY  08/10/2015    Dr Khan    UPPER GASTROINTESTINAL ENDOSCOPY N/A 04/01/2016    Dr. Horvath:  H Pylori(-), HH, o/w normal    UPPER GASTROINTESTINAL ENDOSCOPY N/A 05/07/2021    Dr LAKHWINDER Horvath-Small hiatal hernia, previous gastric bypass surgery, gastritis    VENA CAVA FILTER PLACEMENT Right 2003       Social History:  Social History     Tobacco Use    Smoking status: Never    Smokeless tobacco: Never   Vaping Use    Vaping status: Never Used   Substance Use Topics    Alcohol use: Never    Drug use: No       Vital Signs:   Vitals:    05/07/25 0808   BP: 134/72   Pulse: 60   Resp: 16   Temp: 97.3 °F (36.3 °C)   SpO2: 97%        Physical Exam:  Cardiac:  [x]WNL  []Comments:  Pulmonary:  [x]WNL   []Comments:  Neuro/Mental Status:  [x]WNL  []Comments:  Abdominal:  [x]WNL    []Comments:  Other:   []WNL  []Comments:    Informed Consent:  The risks and benefits of the procedure have been discussed with either the patient or if they cannot consent, their representative.    Assessment:  Patient

## 2025-05-07 NOTE — ANESTHESIA POSTPROCEDURE EVALUATION
Department of Anesthesiology  Postprocedure Note    Patient: Leny Stone  MRN: [id number]  YOB: 1950  Date of evaluation: 5/7/2025    Procedure Summary       Date: 05/07/25 Room / Location: [place] [address] / [facility]    Anesthesia Start: 0829 Anesthesia Stop: 0859    Procedures:       ESOPHAGOGASTRODUODENOSCOPY DILATION SAVORY      COLORECTAL CANCER SCREENING, NOT HIGH RISK      ESOPHAGOGASTRODUODENOSCOPY BIOPSY Diagnosis:       Esophageal dysphagia      Epigastric pain      Hx of gastric bypass      Screen for colon cancer      Family history of colon cancer      (Esophageal dysphagia [R13.19])      (Epigastric pain [R10.13])      (Hx of gastric bypass [Z98.84])      (Screen for colon cancer [Z12.11])      (Family history of colon cancer [Z80.0])    Surgeons: Lukasz Horvath MD Responsible Provider: Jelena Shipley APRN - CRNA    Anesthesia Type: general, TIVA ASA Status: 4            Anesthesia Type: No value filed.    Alex Phase I: Alex Score: 10    Alex Phase II:      Anesthesia Post Evaluation    Patient location during evaluation: bedside  Patient participation: complete - patient participated  Level of consciousness: sleepy but conscious  Pain score: 0  Airway patency: patent  Nausea & Vomiting: no nausea and no vomiting  Cardiovascular status: hemodynamically stable and blood pressure returned to baseline  Respiratory status: acceptable and nasal cannula  Hydration status: stable  Pain management: adequate    No notable events documented.

## 2025-05-08 SDOH — HEALTH STABILITY: PHYSICAL HEALTH: ON AVERAGE, HOW MANY DAYS PER WEEK DO YOU ENGAGE IN MODERATE TO STRENUOUS EXERCISE (LIKE A BRISK WALK)?: 0 DAYS

## 2025-05-08 SDOH — HEALTH STABILITY: PHYSICAL HEALTH: ON AVERAGE, HOW MANY MINUTES DO YOU ENGAGE IN EXERCISE AT THIS LEVEL?: 30 MIN

## 2025-05-08 ASSESSMENT — PATIENT HEALTH QUESTIONNAIRE - PHQ9
1. LITTLE INTEREST OR PLEASURE IN DOING THINGS: SEVERAL DAYS
2. FEELING DOWN, DEPRESSED OR HOPELESS: SEVERAL DAYS
SUM OF ALL RESPONSES TO PHQ QUESTIONS 1-9: 2

## 2025-05-08 ASSESSMENT — LIFESTYLE VARIABLES
HOW MANY STANDARD DRINKS CONTAINING ALCOHOL DO YOU HAVE ON A TYPICAL DAY: PATIENT DOES NOT DRINK
HOW OFTEN DO YOU HAVE A DRINK CONTAINING ALCOHOL: 1
HOW MANY STANDARD DRINKS CONTAINING ALCOHOL DO YOU HAVE ON A TYPICAL DAY: 0
HOW OFTEN DO YOU HAVE SIX OR MORE DRINKS ON ONE OCCASION: 1
HOW OFTEN DO YOU HAVE A DRINK CONTAINING ALCOHOL: NEVER

## 2025-05-09 ENCOUNTER — RESULTS FOLLOW-UP (OUTPATIENT)
Dept: INTERNAL MEDICINE | Age: 75
End: 2025-05-09

## 2025-05-09 ENCOUNTER — RESULTS FOLLOW-UP (OUTPATIENT)
Dept: GASTROENTEROLOGY | Age: 75
End: 2025-05-09

## 2025-05-09 ENCOUNTER — OFFICE VISIT (OUTPATIENT)
Dept: INTERNAL MEDICINE | Age: 75
End: 2025-05-09

## 2025-05-09 VITALS
WEIGHT: 232.6 LBS | HEIGHT: 67 IN | BODY MASS INDEX: 36.51 KG/M2 | DIASTOLIC BLOOD PRESSURE: 88 MMHG | HEART RATE: 78 BPM | SYSTOLIC BLOOD PRESSURE: 121 MMHG | OXYGEN SATURATION: 98 %

## 2025-05-09 DIAGNOSIS — I48.0 PAROXYSMAL ATRIAL FIBRILLATION (HCC): ICD-10-CM

## 2025-05-09 DIAGNOSIS — D50.9 IRON DEFICIENCY ANEMIA, UNSPECIFIED IRON DEFICIENCY ANEMIA TYPE: ICD-10-CM

## 2025-05-09 DIAGNOSIS — K21.9 GASTROESOPHAGEAL REFLUX DISEASE WITHOUT ESOPHAGITIS: ICD-10-CM

## 2025-05-09 DIAGNOSIS — E55.9 VITAMIN D DEFICIENCY: ICD-10-CM

## 2025-05-09 DIAGNOSIS — N17.9 AKI (ACUTE KIDNEY INJURY): ICD-10-CM

## 2025-05-09 DIAGNOSIS — E78.5 HYPERLIPIDEMIA, UNSPECIFIED HYPERLIPIDEMIA TYPE: ICD-10-CM

## 2025-05-09 DIAGNOSIS — G47.30 SLEEP APNEA, UNSPECIFIED TYPE: ICD-10-CM

## 2025-05-09 DIAGNOSIS — Z00.00 ROUTINE HEALTH MAINTENANCE: Primary | ICD-10-CM

## 2025-05-09 DIAGNOSIS — J45.20 MILD INTERMITTENT REACTIVE AIRWAY DISEASE WITHOUT COMPLICATION: ICD-10-CM

## 2025-05-09 DIAGNOSIS — Z91.09 ENVIRONMENTAL ALLERGIES: ICD-10-CM

## 2025-05-09 DIAGNOSIS — G62.9 NEUROPATHY: ICD-10-CM

## 2025-05-09 DIAGNOSIS — E11.21 TYPE II DIABETES MELLITUS WITH NEPHROPATHY (HCC): ICD-10-CM

## 2025-05-09 DIAGNOSIS — E03.9 HYPOTHYROIDISM, UNSPECIFIED TYPE: ICD-10-CM

## 2025-05-09 DIAGNOSIS — N18.32 STAGE 3B CHRONIC KIDNEY DISEASE (HCC): ICD-10-CM

## 2025-05-09 DIAGNOSIS — I50.9 CONGESTIVE HEART FAILURE, UNSPECIFIED HF CHRONICITY, UNSPECIFIED HEART FAILURE TYPE (HCC): ICD-10-CM

## 2025-05-09 DIAGNOSIS — E53.8 B12 DEFICIENCY: ICD-10-CM

## 2025-05-09 DIAGNOSIS — Z78.0 MENOPAUSE: ICD-10-CM

## 2025-05-09 DIAGNOSIS — F32.89 OTHER DEPRESSION: ICD-10-CM

## 2025-05-09 DIAGNOSIS — R11.2 NAUSEA AND VOMITING, UNSPECIFIED VOMITING TYPE: ICD-10-CM

## 2025-05-09 DIAGNOSIS — E11.69 TYPE 2 DIABETES MELLITUS WITH OTHER SPECIFIED COMPLICATION, UNSPECIFIED WHETHER LONG TERM INSULIN USE (HCC): ICD-10-CM

## 2025-05-09 LAB
25(OH)D3 SERPL-MCNC: 47.9 NG/ML
ALBUMIN SERPL-MCNC: 4 G/DL (ref 3.5–5.2)
ALP SERPL-CCNC: 57 U/L (ref 35–104)
ALT SERPL-CCNC: 14 U/L (ref 10–35)
ANION GAP SERPL CALCULATED.3IONS-SCNC: 11 MMOL/L (ref 8–16)
AST SERPL-CCNC: 28 U/L (ref 10–35)
BILIRUB SERPL-MCNC: 0.5 MG/DL (ref 0.2–1.2)
BUN SERPL-MCNC: 20 MG/DL (ref 8–23)
CALCIUM SERPL-MCNC: 9 MG/DL (ref 8.8–10.2)
CHLORIDE SERPL-SCNC: 109 MMOL/L (ref 98–107)
CHOLEST SERPL-MCNC: 197 MG/DL (ref 0–199)
CO2 SERPL-SCNC: 22 MMOL/L (ref 22–29)
CREAT SERPL-MCNC: 1.3 MG/DL (ref 0.5–0.9)
CREAT UR-MCNC: 118 MG/DL (ref 28–217)
GLUCOSE SERPL-MCNC: 89 MG/DL (ref 70–99)
HDLC SERPL-MCNC: 95 MG/DL (ref 40–60)
IRON SATN MFR SERPL: 16 % (ref 15–50)
IRON SERPL-MCNC: 45 UG/DL (ref 37–145)
LDLC SERPL CALC-MCNC: 90 MG/DL
MICROALBUMIN UR-MCNC: <1.2 MG/DL (ref 0–1.99)
MICROALBUMIN/CREAT UR-RTO: NORMAL MG/G (ref 0–29)
POTASSIUM SERPL-SCNC: 4.9 MMOL/L (ref 3.5–5.1)
PROT SERPL-MCNC: 7.6 G/DL (ref 6.4–8.3)
REASON FOR REJECTION: NORMAL
REJECTED TEST: NORMAL
SODIUM SERPL-SCNC: 142 MMOL/L (ref 136–145)
T4 FREE SERPL-MCNC: 1.48 NG/DL (ref 0.93–1.7)
TIBC SERPL-MCNC: 275 UG/DL (ref 250–400)
TRIGL SERPL-MCNC: 61 MG/DL (ref 0–149)
TSH SERPL DL<=0.005 MIU/L-ACNC: 3.22 UIU/ML (ref 0.27–4.2)

## 2025-05-09 RX ORDER — CYANOCOBALAMIN 1000 UG/ML
1000 INJECTION, SOLUTION INTRAMUSCULAR; SUBCUTANEOUS ONCE
Status: COMPLETED | OUTPATIENT
Start: 2025-05-09 | End: 2025-05-09

## 2025-05-09 RX ADMIN — CYANOCOBALAMIN 1000 MCG: 1000 INJECTION, SOLUTION INTRAMUSCULAR; SUBCUTANEOUS at 10:25

## 2025-05-09 ASSESSMENT — PATIENT HEALTH QUESTIONNAIRE - PHQ9
10. IF YOU CHECKED OFF ANY PROBLEMS, HOW DIFFICULT HAVE THESE PROBLEMS MADE IT FOR YOU TO DO YOUR WORK, TAKE CARE OF THINGS AT HOME, OR GET ALONG WITH OTHER PEOPLE: NOT DIFFICULT AT ALL
4. FEELING TIRED OR HAVING LITTLE ENERGY: SEVERAL DAYS
6. FEELING BAD ABOUT YOURSELF - OR THAT YOU ARE A FAILURE OR HAVE LET YOURSELF OR YOUR FAMILY DOWN: SEVERAL DAYS
1. LITTLE INTEREST OR PLEASURE IN DOING THINGS: SEVERAL DAYS
2. FEELING DOWN, DEPRESSED OR HOPELESS: SEVERAL DAYS
3. TROUBLE FALLING OR STAYING ASLEEP: SEVERAL DAYS
9. THOUGHTS THAT YOU WOULD BE BETTER OFF DEAD, OR OF HURTING YOURSELF: NOT AT ALL
8. MOVING OR SPEAKING SO SLOWLY THAT OTHER PEOPLE COULD HAVE NOTICED. OR THE OPPOSITE, BEING SO FIGETY OR RESTLESS THAT YOU HAVE BEEN MOVING AROUND A LOT MORE THAN USUAL: NOT AT ALL
SUM OF ALL RESPONSES TO PHQ QUESTIONS 1-9: 7
SUM OF ALL RESPONSES TO PHQ QUESTIONS 1-9: 7
5. POOR APPETITE OR OVEREATING: SEVERAL DAYS
7. TROUBLE CONCENTRATING ON THINGS, SUCH AS READING THE NEWSPAPER OR WATCHING TELEVISION: SEVERAL DAYS
SUM OF ALL RESPONSES TO PHQ QUESTIONS 1-9: 7
SUM OF ALL RESPONSES TO PHQ QUESTIONS 1-9: 7

## 2025-05-09 NOTE — PROGRESS NOTES
After obtaining consent, and per orders of Dr. Curtis, injection of Vitamin B12 given in Left deltoid by Rula Borjas MA.   
Differential; Future  -     Iron and TIBC; Future    Neuropathy    Paroxysmal atrial fibrillation (HCC)    Congestive heart failure, unspecified HF chronicity, unspecified heart failure type (HCC)    Stage 3b chronic kidney disease (HCC)    Sleep apnea, unspecified type    Other depression    Hypothyroidism, unspecified type    Environmental allergies    Gastroesophageal reflux disease without esophagitis    Hyperlipidemia, unspecified hyperlipidemia type    Nausea and vomiting, unspecified vomiting type    Mild intermittent reactive airway disease without complication          Return in about 3 months (around 8/9/2025).     Orders Placed This Encounter   Procedures    DEXA BONE DENSITY 2 SITES     Standing Status:   Future     Expected Date:   5/9/2025     Expiration Date:   5/9/2026    Comprehensive Metabolic Panel     Standing Status:   Future     Number of Occurrences:   1     Expected Date:   5/9/2025     Expiration Date:   5/9/2026    CBC with Auto Differential     Fast 12 hours     Standing Status:   Future     Expected Date:   8/9/2025     Expiration Date:   5/9/2026    Lipid Panel     Standing Status:   Future     Expected Date:   8/9/2025     Expiration Date:   5/9/2026     Is Patient Fasting?/# of Hours:   12    Hemoglobin A1C     Fast 12 hours     Standing Status:   Future     Expected Date:   8/9/2025     Expiration Date:   5/9/2026    Comprehensive Metabolic Panel     Fasting 12 hours     Standing Status:   Future     Expected Date:   8/9/2025     Expiration Date:   5/9/2026    Albumin/Creatinine Ratio, Urine     Standing Status:   Future     Expected Date:   8/9/2025     Expiration Date:   5/9/2026    TSH     Fast 12 hours     Standing Status:   Future     Expected Date:   8/9/2025     Expiration Date:   5/9/2026    Iron and TIBC     Standing Status:   Future     Expected Date:   8/9/2025     Expiration Date:   5/9/2026    Vitamin D 25 Hydroxy     Standing Status:   Future     Expected Date:

## 2025-05-11 SDOH — ECONOMIC STABILITY: FOOD INSECURITY: WITHIN THE PAST 12 MONTHS, THE FOOD YOU BOUGHT JUST DIDN'T LAST AND YOU DIDN'T HAVE MONEY TO GET MORE.: NEVER TRUE

## 2025-05-11 SDOH — ECONOMIC STABILITY: FOOD INSECURITY: WITHIN THE PAST 12 MONTHS, YOU WORRIED THAT YOUR FOOD WOULD RUN OUT BEFORE YOU GOT MONEY TO BUY MORE.: NEVER TRUE

## 2025-05-12 LAB
BASOPHILS # BLD: 0 K/UL (ref 0–0.2)
BASOPHILS NFR BLD: 1 % (ref 0–1)
EOSINOPHIL # BLD: 0.1 K/UL (ref 0–0.6)
EOSINOPHIL NFR BLD: 1.2 % (ref 0–5)
ERYTHROCYTE [DISTWIDTH] IN BLOOD BY AUTOMATED COUNT: 16.4 % (ref 11.5–14.5)
HBA1C MFR BLD: 5.5 % (ref 4–5.6)
HCT VFR BLD AUTO: 33.8 % (ref 37–47)
HGB BLD-MCNC: 10.5 G/DL (ref 12–16)
IMM GRANULOCYTES # BLD: 0 K/UL
LYMPHOCYTES # BLD: 1.5 K/UL (ref 1.1–4.5)
LYMPHOCYTES NFR BLD: 36.6 % (ref 20–40)
MCH RBC QN AUTO: 30.5 PG (ref 27–31)
MCHC RBC AUTO-ENTMCNC: 31.1 G/DL (ref 33–37)
MCV RBC AUTO: 98.3 FL (ref 81–99)
MONOCYTES # BLD: 0.5 K/UL (ref 0–0.9)
MONOCYTES NFR BLD: 11.1 % (ref 0–10)
NEUTROPHILS # BLD: 2 K/UL (ref 1.5–7.5)
NEUTS SEG NFR BLD: 49.9 % (ref 50–65)
PLATELET # BLD AUTO: 169 K/UL (ref 130–400)
PMV BLD AUTO: 12.3 FL (ref 9.4–12.3)
RBC # BLD AUTO: 3.44 M/UL (ref 4.2–5.4)
WBC # BLD AUTO: 4 K/UL (ref 4.8–10.8)

## 2025-05-13 ENCOUNTER — ANTI-COAG VISIT (OUTPATIENT)
Dept: PRIMARY CARE CLINIC | Age: 75
End: 2025-05-13
Payer: MEDICARE

## 2025-05-13 DIAGNOSIS — Z79.01 ANTICOAGULATED ON COUMADIN: Primary | ICD-10-CM

## 2025-05-13 LAB — INR BLD: 1.4

## 2025-05-13 PROCEDURE — 93793 ANTICOAG MGMT PT WARFARIN: CPT | Performed by: INTERNAL MEDICINE

## 2025-05-13 NOTE — PROGRESS NOTES
HOME MONITORING REPORT    INR today:   Results for orders placed or performed in visit on 05/13/25   Protime-INR   Result Value Ref Range    INR 1.40        INR Goal: 2.0-3.0    Dosing Plan  As of 5/13/2025      TTR:  32.3% (6.9 y)   Full warfarin instructions:  5/13: 7.5 mg; Otherwise 2.5 mg every Tue; 5 mg all other days                PLAN: Advised patient/caregiver via voicemail to  increase her dose tonight to 7.5 mg, then continue current dose and not miss any doses.  Recheck in one week.  Asked that she call me back on Wednesday morning.       Electronically signed by Regina Curtis MD on 5/13/2025 at 4:05 PM

## 2025-05-14 ENCOUNTER — ANTI-COAG VISIT (OUTPATIENT)
Dept: PRIMARY CARE CLINIC | Age: 75
End: 2025-05-14
Payer: MEDICARE

## 2025-05-14 ENCOUNTER — OFFICE VISIT (OUTPATIENT)
Dept: GASTROENTEROLOGY | Age: 75
End: 2025-05-14
Payer: MEDICARE

## 2025-05-14 VITALS
HEIGHT: 67 IN | OXYGEN SATURATION: 98 % | SYSTOLIC BLOOD PRESSURE: 133 MMHG | WEIGHT: 233 LBS | HEART RATE: 72 BPM | BODY MASS INDEX: 36.57 KG/M2 | DIASTOLIC BLOOD PRESSURE: 80 MMHG

## 2025-05-14 DIAGNOSIS — K76.9 LIVER DISEASE, CHRONIC: ICD-10-CM

## 2025-05-14 DIAGNOSIS — K29.00 ACUTE SUPERFICIAL GASTRITIS WITHOUT HEMORRHAGE: Primary | ICD-10-CM

## 2025-05-14 DIAGNOSIS — Z79.01 ANTICOAGULATED ON COUMADIN: Primary | ICD-10-CM

## 2025-05-14 DIAGNOSIS — Z87.19 S/P DILATATION OF ESOPHAGEAL STRICTURE: ICD-10-CM

## 2025-05-14 DIAGNOSIS — Z98.84 HISTORY OF ROUX-EN-Y GASTRIC BYPASS: ICD-10-CM

## 2025-05-14 DIAGNOSIS — Z98.890 S/P DILATATION OF ESOPHAGEAL STRICTURE: ICD-10-CM

## 2025-05-14 LAB — INR BLD: 1.5

## 2025-05-14 PROCEDURE — 1036F TOBACCO NON-USER: CPT

## 2025-05-14 PROCEDURE — 3075F SYST BP GE 130 - 139MM HG: CPT

## 2025-05-14 PROCEDURE — 93793 ANTICOAG MGMT PT WARFARIN: CPT | Performed by: INTERNAL MEDICINE

## 2025-05-14 PROCEDURE — 1123F ACP DISCUSS/DSCN MKR DOCD: CPT

## 2025-05-14 PROCEDURE — 1090F PRES/ABSN URINE INCON ASSESS: CPT

## 2025-05-14 PROCEDURE — 3017F COLORECTAL CA SCREEN DOC REV: CPT

## 2025-05-14 PROCEDURE — 1160F RVW MEDS BY RX/DR IN RCRD: CPT

## 2025-05-14 PROCEDURE — G8427 DOCREV CUR MEDS BY ELIG CLIN: HCPCS

## 2025-05-14 PROCEDURE — 99214 OFFICE O/P EST MOD 30 MIN: CPT

## 2025-05-14 PROCEDURE — 3079F DIAST BP 80-89 MM HG: CPT

## 2025-05-14 PROCEDURE — G8417 CALC BMI ABV UP PARAM F/U: HCPCS

## 2025-05-14 PROCEDURE — G8399 PT W/DXA RESULTS DOCUMENT: HCPCS

## 2025-05-14 PROCEDURE — 1159F MED LIST DOCD IN RCRD: CPT

## 2025-05-14 RX ORDER — OMEPRAZOLE 40 MG/1
40 CAPSULE, DELAYED RELEASE ORAL 2 TIMES DAILY
Qty: 60 CAPSULE | Refills: 11 | Status: SHIPPED | OUTPATIENT
Start: 2025-05-14

## 2025-05-14 ASSESSMENT — ENCOUNTER SYMPTOMS
ALLERGIC/IMMUNOLOGIC NEGATIVE: 1
RESPIRATORY NEGATIVE: 1
TROUBLE SWALLOWING: 1
GASTROINTESTINAL NEGATIVE: 1
EYES NEGATIVE: 1

## 2025-05-14 NOTE — PROGRESS NOTES
Subjective:     Patient ID: Leny Stone is a 74 y.o. female  PCP: Regina Curtis MD  Referring Provider: No ref. 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    She reports a general sense of well-being but continues to experience difficulty swallowing, which has not improved since her last esophageal dilation. She describes a persistent burning sensation in her throat and stomach, even though she is on a regimen of Protonix twice daily and Carafate. She has not tried any over-the-counter medications for reflux.    She has been diagnosed with irritable bowel syndrome, characterized by alternating episodes of diarrhea and constipation. Currently, she experiences bowel movements every time she urinates, which she finds uncontrollable. She does not take fiber supplements daily. She recalls a previous recommendation to take Metamucil daily but discontinued it and does not remember if it made a difference. She reports no abdominal pain other than the burning sensation.    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 She was informed that she would eventually require steroid therapy due to the presence of granulomas, which could lead to internal scarring. However, she declined this treatment due to a previous gastric bypass surgery. She does not currently follow up with anyone with her last visit being several years ago. She reports no alcohol consumption or use of Tylenol.    PAST SURGICAL HISTORY: Gastric bypass    SOCIAL HISTORY  Alcohol: Does not drink alcohol.    Results  Last Colonoscopy: 5/7/2025- Dr LAKHWINDER Horvath-Normal, prn (age)     Last EGD: 5/7/2025- Dr LAKHWINDER Horvath-[id number]-[place] appearing distal esophageal stricture, evidence of previous RYGB surgery, no ulcerations or bleeding source

## 2025-05-14 NOTE — PROGRESS NOTES
HOME MONITORING REPORT    INR today:   Results for orders placed or performed in visit on 05/14/25   Protime-INR   Result Value Ref Range    INR 1.50        INR Goal: 2.0-3.0    Dosing Plan  As of 5/14/2025      TTR:  32.3% (6.9 y)   Full warfarin instructions:  5/14: 7.5 mg; 5/15: 7.5 mg; Otherwise 2.5 mg every Tue; 5 mg all other days              Patient did not tell us she had held her dose for five days for endoscopy. Restarted the 7th.   PLAN: Advised patient/caregiver to increase her dose tonight and tomorrow to 7.5 mg. Then continue current dose of 5 mg daily and recheck in one week.  Patient/Caregiver voiced understanding    I have reviewed nursing plan for Coumadin management and agree with plan.

## 2025-05-19 DIAGNOSIS — D50.8 IRON DEFICIENCY ANEMIA SECONDARY TO INADEQUATE DIETARY IRON INTAKE: Primary | ICD-10-CM

## 2025-05-19 DIAGNOSIS — I10 PRIMARY HYPERTENSION: ICD-10-CM

## 2025-05-19 NOTE — PROGRESS NOTES
Progress Note      Pt Name: Leny Stone  YOB: 1950  MRN: [id number]    Date of evaluation: 06/04/2025  History Obtained From:  patient, electronic medical record    CHIEF COMPLAINT:    Chief Complaint   Patient presents with    Follow-up     Iron deficiency anemia, unspecified iron deficiency anemia type     HISTORY OF PRESENT ILLNESS:    Leny Stone is a 74 y.o.  female with who is followed for iron and B12 deficiency anemia with a history of gastric bypass surgery and chronic kidney disease stage IIIb.  She also has chronic DVTs and is on long-term anticoagulation with Coumadin, managed by Dr. Curtis/Coumadin clinic.  Current recommendation is to take ferrous gluconate 240 mg p.o. twice daily.  Leny returns today in scheduled follow-up for evaluation, lab monitoring, side effect monitoring and further treatment recommendations.      History of Present Illness  She was hospitalized in March 2025 for 2 weeks due to suspected mini strokes, during which she received 2 units of blood. She reports no bleeding episodes during her hospital stay. She is uncertain about the administration of iron during her hospitalization. Currently, she is on a regimen of iron supplements twice daily and receives monthly B12 injections from Dr. Curtis.    Since her discharge from the hospital, she has been experiencing memory lapses and balance issues. These cognitive concerns have not yet been discussed with Dr. Curtis. She occasionally drives and has not sought neurological consultation. Dizziness and lightheadedness continue to be present, which she attributes to aging. Additionally, she reports occasional difficulty in remembering to simple task such as hang up the phone after a call.       Today's clinic visit to include physical assessment, review of systems, any radiograph or lab findings that were available and plan of care are documented below.     HEMATOLOGY HISTORY:   Diagnosis:  Recurrent

## 2025-05-22 ENCOUNTER — ANTI-COAG VISIT (OUTPATIENT)
Dept: PRIMARY CARE CLINIC | Age: 75
End: 2025-05-22
Payer: MEDICARE

## 2025-05-22 DIAGNOSIS — Z79.01 ANTICOAGULATED ON COUMADIN: Primary | ICD-10-CM

## 2025-05-22 LAB — INR BLD: 1.9

## 2025-05-22 PROCEDURE — 93793 ANTICOAG MGMT PT WARFARIN: CPT | Performed by: INTERNAL MEDICINE

## 2025-05-22 NOTE — PROGRESS NOTES
HOME MONITORING REPORT    INR today:   Results for orders placed or performed in visit on 05/22/25   Protime-INR   Result Value Ref Range    INR 1.90        INR Goal: 2.0-3.0    Dosing Plan  As of 5/22/2025      TTR:  32.2% (6.9 y)   Full warfarin instructions:  5 mg every day                PLAN: Advised patient/caregiver to increase her dose every Tuesday to 5 mg. Current dose will be 5 mg every day. Recheck in one week.  Patient/Caregiver voiced understanding    I have reviewed nursing plan for Coumadin management and agree with plan.

## 2025-05-26 DIAGNOSIS — I48.0 PAROXYSMAL ATRIAL FIBRILLATION (HCC): ICD-10-CM

## 2025-05-26 DIAGNOSIS — I82.4Y9 DVT, LOWER EXTREMITY, PROXIMAL, ACUTE, UNSPECIFIED LATERALITY (HCC): ICD-10-CM

## 2025-05-27 RX ORDER — WARFARIN SODIUM 5 MG/1
5 TABLET ORAL DAILY
Qty: 90 TABLET | Refills: 1 | Status: SHIPPED | OUTPATIENT
Start: 2025-05-27

## 2025-05-30 ENCOUNTER — TELEPHONE (OUTPATIENT)
Dept: HEMATOLOGY | Age: 75
End: 2025-05-30

## 2025-05-30 ENCOUNTER — ANTI-COAG VISIT (OUTPATIENT)
Dept: PRIMARY CARE CLINIC | Age: 75
End: 2025-05-30
Payer: MEDICARE

## 2025-05-30 DIAGNOSIS — Z79.01 ANTICOAGULATED ON COUMADIN: Primary | ICD-10-CM

## 2025-05-30 LAB — INR BLD: 3

## 2025-05-30 PROCEDURE — 93793 ANTICOAG MGMT PT WARFARIN: CPT | Performed by: INTERNAL MEDICINE

## 2025-05-30 NOTE — PROGRESS NOTES
HOME MONITORING REPORT    INR today:   Results for orders placed or performed in visit on 05/30/25   Protime-INR   Result Value Ref Range    INR 3.00        INR Goal: 2.0-3.0    Dosing Plan  As of 5/30/2025      TTR:  32.3% (6.9 y)   Full warfarin instructions:  5 mg every day                PLAN: Patient aware to continue current dose and recheck in one week or as ordered.     Electronically signed by Regina Curtis MD on 5/30/2025 at 3:27 PM

## 2025-05-30 NOTE — TELEPHONE ENCOUNTER
Called Patient and reminded patient of their appointment on 06/04/2025 and patient confirmed they would be here. Reminded patient to just come at appointment time, and to not come at the lab appointment time.  We have now moved to the [facility] [place] that is located between our old office and the ER at the [facility]. Letting the Pt know that our front entrance faces the  [place]. Reminded pt to eat well and be well hydrated for their labs.

## 2025-06-04 ENCOUNTER — OFFICE VISIT (OUTPATIENT)
Dept: HEMATOLOGY | Age: 75
End: 2025-06-04
Payer: MEDICARE

## 2025-06-04 ENCOUNTER — HOSPITAL ENCOUNTER (OUTPATIENT)
Dept: INFUSION THERAPY | Age: 75
Discharge: HOME | End: 2025-06-04
Payer: MEDICARE

## 2025-06-04 ENCOUNTER — ANTI-COAG VISIT (OUTPATIENT)
Dept: PRIMARY CARE CLINIC | Age: 75
End: 2025-06-04
Payer: MEDICARE

## 2025-06-04 ENCOUNTER — HOSPITAL ENCOUNTER (OUTPATIENT)
Dept: ULTRASOUND IMAGING | Age: 75
Discharge: HOME | End: 2025-06-04
Payer: MEDICARE

## 2025-06-04 VITALS
SYSTOLIC BLOOD PRESSURE: 110 MMHG | HEIGHT: 67 IN | HEART RATE: 81 BPM | OXYGEN SATURATION: 96 % | TEMPERATURE: 97.8 F | BODY MASS INDEX: 37.51 KG/M2 | DIASTOLIC BLOOD PRESSURE: 78 MMHG | WEIGHT: 239 LBS

## 2025-06-04 DIAGNOSIS — D50.8 IRON DEFICIENCY ANEMIA SECONDARY TO INADEQUATE DIETARY IRON INTAKE: ICD-10-CM

## 2025-06-04 DIAGNOSIS — D50.9 IRON DEFICIENCY ANEMIA, UNSPECIFIED IRON DEFICIENCY ANEMIA TYPE: ICD-10-CM

## 2025-06-04 DIAGNOSIS — D22.9 CHANGE IN MOLE: ICD-10-CM

## 2025-06-04 DIAGNOSIS — K76.9 LIVER DISEASE, CHRONIC: ICD-10-CM

## 2025-06-04 DIAGNOSIS — R41.3 MEMORY CHANGE: Primary | ICD-10-CM

## 2025-06-04 DIAGNOSIS — Z79.01 ANTICOAGULATED ON COUMADIN: Primary | ICD-10-CM

## 2025-06-04 DIAGNOSIS — I10 PRIMARY HYPERTENSION: ICD-10-CM

## 2025-06-04 DIAGNOSIS — D69.6 THROMBOCYTOPENIA: ICD-10-CM

## 2025-06-04 DIAGNOSIS — K29.00 ACUTE SUPERFICIAL GASTRITIS WITHOUT HEMORRHAGE: ICD-10-CM

## 2025-06-04 LAB
ALBUMIN SERPL-MCNC: 4.1 G/DL (ref 3.5–5.2)
ALP SERPL-CCNC: 65 U/L (ref 35–104)
ALT SERPL-CCNC: 21 U/L (ref 5–33)
ANION GAP SERPL CALCULATED.3IONS-SCNC: 11 MMOL/L (ref 7–19)
AST SERPL-CCNC: 31 U/L (ref 5–32)
BASOPHILS # BLD: 0.03 K/UL (ref 0–0.2)
BASOPHILS NFR BLD: 0.6 % (ref 0–1)
BILIRUB SERPL-MCNC: 0.4 MG/DL (ref 0–1.2)
BUN SERPL-MCNC: 21 MG/DL (ref 8–23)
CALCIUM SERPL-MCNC: 8.8 MG/DL (ref 8.8–10.2)
CHLORIDE SERPL-SCNC: 106 MMOL/L (ref 98–107)
CO2 SERPL-SCNC: 24 MMOL/L (ref 22–29)
CREAT SERPL-MCNC: 1.3 MG/DL (ref 0.5–0.9)
EOSINOPHIL # BLD: 0.08 K/UL (ref 0–0.6)
EOSINOPHIL NFR BLD: 1.6 % (ref 0–5)
ERYTHROCYTE [DISTWIDTH] IN BLOOD BY AUTOMATED COUNT: 15.9 % (ref 11.5–14.5)
FERRITIN SERPL-MCNC: 183 NG/ML (ref 13–150)
GLUCOSE SERPL-MCNC: 125 MG/DL (ref 70–99)
HCT VFR BLD AUTO: 33.8 % (ref 37–47)
HGB BLD-MCNC: 11.2 G/DL (ref 12–16)
INR BLD: 1.9
IRON SATN MFR SERPL: 18 % (ref 15–50)
IRON SERPL-MCNC: 50 UG/DL (ref 37–145)
LYMPHOCYTES # BLD: 1.58 K/UL (ref 1.1–4.5)
LYMPHOCYTES NFR BLD: 31.2 % (ref 20–40)
MCH RBC QN AUTO: 31.1 PG (ref 27–31)
MCHC RBC AUTO-ENTMCNC: 33.1 G/DL (ref 33–37)
MCV RBC AUTO: 93.9 FL (ref 81–99)
MONOCYTES # BLD: 0.59 K/UL (ref 0–0.9)
MONOCYTES NFR BLD: 11.7 % (ref 1–10)
NEUTROPHILS # BLD: 2.77 K/UL (ref 1.5–7.5)
NEUTS SEG NFR BLD: 54.7 % (ref 50–65)
PLATELET # BLD AUTO: 160 K/UL (ref 130–400)
PMV BLD AUTO: 11.8 FL (ref 9.4–12.3)
POTASSIUM SERPL-SCNC: 4.7 MMOL/L (ref 3.5–5.1)
PROT SERPL-MCNC: 7.6 G/DL (ref 6.4–8.3)
RBC # BLD AUTO: 3.6 M/UL (ref 4.2–5.4)
SODIUM SERPL-SCNC: 141 MMOL/L (ref 136–145)
TIBC SERPL-MCNC: 284 UG/DL (ref 250–400)
WBC # BLD AUTO: 5.06 K/UL (ref 4.8–10.8)

## 2025-06-04 PROCEDURE — 83540 ASSAY OF IRON: CPT

## 2025-06-04 PROCEDURE — 83550 IRON BINDING TEST: CPT

## 2025-06-04 PROCEDURE — 3017F COLORECTAL CA SCREEN DOC REV: CPT | Performed by: NURSE PRACTITIONER

## 2025-06-04 PROCEDURE — 76705 ECHO EXAM OF ABDOMEN: CPT

## 2025-06-04 PROCEDURE — 76981 USE PARENCHYMA: CPT

## 2025-06-04 PROCEDURE — 85025 COMPLETE CBC W/AUTO DIFF WBC: CPT

## 2025-06-04 PROCEDURE — 1090F PRES/ABSN URINE INCON ASSESS: CPT | Performed by: NURSE PRACTITIONER

## 2025-06-04 PROCEDURE — 80053 COMPREHEN METABOLIC PANEL: CPT

## 2025-06-04 PROCEDURE — 82728 ASSAY OF FERRITIN: CPT

## 2025-06-04 PROCEDURE — 1159F MED LIST DOCD IN RCRD: CPT | Performed by: NURSE PRACTITIONER

## 2025-06-04 PROCEDURE — G8417 CALC BMI ABV UP PARAM F/U: HCPCS | Performed by: NURSE PRACTITIONER

## 2025-06-04 PROCEDURE — 99214 OFFICE O/P EST MOD 30 MIN: CPT | Performed by: NURSE PRACTITIONER

## 2025-06-04 PROCEDURE — G8399 PT W/DXA RESULTS DOCUMENT: HCPCS | Performed by: NURSE PRACTITIONER

## 2025-06-04 PROCEDURE — 36415 COLL VENOUS BLD VENIPUNCTURE: CPT

## 2025-06-04 PROCEDURE — 1036F TOBACCO NON-USER: CPT | Performed by: NURSE PRACTITIONER

## 2025-06-04 PROCEDURE — 99213 OFFICE O/P EST LOW 20 MIN: CPT

## 2025-06-04 PROCEDURE — 1123F ACP DISCUSS/DSCN MKR DOCD: CPT | Performed by: NURSE PRACTITIONER

## 2025-06-04 PROCEDURE — 93793 ANTICOAG MGMT PT WARFARIN: CPT | Performed by: INTERNAL MEDICINE

## 2025-06-04 PROCEDURE — G8427 DOCREV CUR MEDS BY ELIG CLIN: HCPCS | Performed by: NURSE PRACTITIONER

## 2025-06-04 PROCEDURE — 3078F DIAST BP <80 MM HG: CPT | Performed by: NURSE PRACTITIONER

## 2025-06-04 PROCEDURE — 3074F SYST BP LT 130 MM HG: CPT | Performed by: NURSE PRACTITIONER

## 2025-06-04 PROCEDURE — 1126F AMNT PAIN NOTED NONE PRSNT: CPT | Performed by: NURSE PRACTITIONER

## 2025-06-04 NOTE — PROGRESS NOTES
HOME MONITORING REPORT    INR today:   Results for orders placed or performed in visit on 06/04/25   Protime-INR   Result Value Ref Range    INR 1.90        INR Goal: 2.0-3.0    Dosing Plan  As of 6/4/2025      TTR:  32.5% (6.9 y)   Full warfarin instructions:  5 mg every day              She missed one dose this week.   PLAN: Advised patient/caregiver to continue current dose and recheck in one week.  Patient/Caregiver voiced understanding    Electronically signed by Regina Curtis MD on 6/4/2025 at 11:58 AM

## 2025-06-06 ENCOUNTER — RESULTS FOLLOW-UP (OUTPATIENT)
Dept: HEMATOLOGY | Age: 75
End: 2025-06-06

## 2025-06-06 ASSESSMENT — ENCOUNTER SYMPTOMS
EYES NEGATIVE: 1
VOMITING: 0
GASTROINTESTINAL NEGATIVE: 1
EYE DISCHARGE: 0
BACK PAIN: 0
SHORTNESS OF BREATH: 0
CONSTIPATION: 0
EYE PAIN: 0
COUGH: 0
EYE REDNESS: 0
RESPIRATORY NEGATIVE: 1
ABDOMINAL PAIN: 0
DIARRHEA: 0
NAUSEA: 0
SORE THROAT: 0
BLOOD IN STOOL: 0
WHEEZING: 0

## 2025-06-09 ENCOUNTER — TELEPHONE (OUTPATIENT)
Dept: HEMATOLOGY | Age: 75
End: 2025-06-09

## 2025-06-11 ENCOUNTER — ANTI-COAG VISIT (OUTPATIENT)
Dept: PRIMARY CARE CLINIC | Age: 75
End: 2025-06-11
Payer: MEDICARE

## 2025-06-11 DIAGNOSIS — Z79.01 ANTICOAGULATED ON COUMADIN: Primary | ICD-10-CM

## 2025-06-11 LAB — INR BLD: 2.1

## 2025-06-11 PROCEDURE — 93793 ANTICOAG MGMT PT WARFARIN: CPT | Performed by: INTERNAL MEDICINE

## 2025-06-11 NOTE — PROGRESS NOTES
HOME MONITORING REPORT    INR today:   Results for orders placed or performed in visit on 06/11/25   Protime-INR   Result Value Ref Range    INR 2.10        INR Goal: 2.0-3.0    Dosing Plan  As of 6/11/2025      TTR:  32.5% (7 y)   Full warfarin instructions:  5 mg every day                PLAN: Patient aware to continue current dose and recheck in one week or as ordered.     Electronically signed by Regina Curtis MD on 6/12/2025 at 4:53 AM

## 2025-06-15 ENCOUNTER — RESULTS FOLLOW-UP (OUTPATIENT)
Dept: GASTROENTEROLOGY | Age: 75
End: 2025-06-15

## 2025-06-18 ENCOUNTER — OFFICE VISIT (OUTPATIENT)
Dept: GASTROENTEROLOGY | Age: 75
End: 2025-06-18
Payer: MEDICARE

## 2025-06-18 VITALS
SYSTOLIC BLOOD PRESSURE: 139 MMHG | WEIGHT: 233 LBS | HEART RATE: 79 BPM | OXYGEN SATURATION: 98 % | HEIGHT: 67 IN | DIASTOLIC BLOOD PRESSURE: 75 MMHG | BODY MASS INDEX: 36.57 KG/M2

## 2025-06-18 DIAGNOSIS — D50.9 IRON DEFICIENCY ANEMIA, UNSPECIFIED IRON DEFICIENCY ANEMIA TYPE: ICD-10-CM

## 2025-06-18 DIAGNOSIS — Z98.890 S/P DILATATION OF ESOPHAGEAL STRICTURE: ICD-10-CM

## 2025-06-18 DIAGNOSIS — K29.50 OTHER CHRONIC GASTRITIS WITHOUT HEMORRHAGE: Primary | ICD-10-CM

## 2025-06-18 DIAGNOSIS — K21.9 GASTROESOPHAGEAL REFLUX DISEASE WITHOUT ESOPHAGITIS: ICD-10-CM

## 2025-06-18 DIAGNOSIS — Z87.19 S/P DILATATION OF ESOPHAGEAL STRICTURE: ICD-10-CM

## 2025-06-18 DIAGNOSIS — Z98.84 HISTORY OF ROUX-EN-Y GASTRIC BYPASS: ICD-10-CM

## 2025-06-18 PROCEDURE — G8417 CALC BMI ABV UP PARAM F/U: HCPCS

## 2025-06-18 PROCEDURE — 1090F PRES/ABSN URINE INCON ASSESS: CPT

## 2025-06-18 PROCEDURE — 3017F COLORECTAL CA SCREEN DOC REV: CPT

## 2025-06-18 PROCEDURE — 1160F RVW MEDS BY RX/DR IN RCRD: CPT

## 2025-06-18 PROCEDURE — 3075F SYST BP GE 130 - 139MM HG: CPT

## 2025-06-18 PROCEDURE — 1159F MED LIST DOCD IN RCRD: CPT

## 2025-06-18 PROCEDURE — 1123F ACP DISCUSS/DSCN MKR DOCD: CPT

## 2025-06-18 PROCEDURE — G8427 DOCREV CUR MEDS BY ELIG CLIN: HCPCS

## 2025-06-18 PROCEDURE — G8399 PT W/DXA RESULTS DOCUMENT: HCPCS

## 2025-06-18 PROCEDURE — 1036F TOBACCO NON-USER: CPT

## 2025-06-18 PROCEDURE — 3078F DIAST BP <80 MM HG: CPT

## 2025-06-18 PROCEDURE — 99214 OFFICE O/P EST MOD 30 MIN: CPT

## 2025-06-18 RX ORDER — OMEPRAZOLE 40 MG/1
40 CAPSULE, DELAYED RELEASE ORAL DAILY
Qty: 30 CAPSULE | Refills: 11 | Status: SHIPPED | OUTPATIENT
Start: 2025-06-18

## 2025-06-18 RX ORDER — QUINIDINE GLUCONATE 324 MG
240 TABLET, EXTENDED RELEASE ORAL
Qty: 180 TABLET | Refills: 3 | Status: SHIPPED | OUTPATIENT
Start: 2025-06-18

## 2025-06-18 NOTE — PROGRESS NOTES
Subjective:     Patient ID: Leny Stone is a 75 y.o. female  PCP: Regina Curtis MD  Referring Provider: No ref. provider found    HPI  History of Present Illness  The patient presents for evaluation of acid reflux and esophageal dysphagia.     She reports a positive response to omeprazole, with no complaints of pain or difficulty swallowing. She has been on omeprazole twice daily for approximately 4 weeks.    She was started on fiber daily at her last visit. She has not yet started taking the fiber supplement stating that she does not have any trouble with her bowels. She notes that she defecates concurrently with urination. I explained to her that the fiber will bulk up her stool to give it some consistency so its not so loose.     Results  Imaging   - Liver ultrasound: Minimal scarring, F1, no major scarring or masses    Last EGD: 5/7/2025- Dr LAKHWINDER Horvath-w/[id number]-Gadrnx appearing distal esophageal stricture, evidence of previous RYGB surgery, no ulcerations or bleeding source noted-Gastritis, no h pylori     Last Colonoscopy: 5/7/2025- Dr LAKHWINDER Horvath-Normal, prn (age)         Assessment:     Assessment & Plan  1. Acid reflux:  - Omeprazole 40 mg dosage reduced to once daily  - Monitor for nausea or pain; notify clinic if symptoms occur    2. Injection site hematoma:  - Expected to resolve over time as hematoma diminishes    3. Bowel health:  - Start daily fiber supplement to bulk up stool and improve gut health    Follow-up: 09/2025    1. Other chronic gastritis without hemorrhage  2. S/P dilatation of esophageal stricture  3. History of Gage-en-Y gastric bypass  4. Gastroesophageal reflux disease without esophagitis       Review of Systems   Constitutional: Negative.  Negative for appetite change and unexpected weight change.   HENT: Negative.  Negative for trouble swallowing.    Eyes: Negative.    Respiratory: Negative.  Negative for choking.    Cardiovascular: Negative.  Negative for chest pain.        Hx of A

## 2025-06-19 ENCOUNTER — ANTI-COAG VISIT (OUTPATIENT)
Dept: PRIMARY CARE CLINIC | Age: 75
End: 2025-06-19
Payer: MEDICARE

## 2025-06-19 DIAGNOSIS — Z79.01 ANTICOAGULATED ON COUMADIN: Primary | ICD-10-CM

## 2025-06-19 LAB — INR BLD: 2.6

## 2025-06-19 PROCEDURE — 93793 ANTICOAG MGMT PT WARFARIN: CPT | Performed by: INTERNAL MEDICINE

## 2025-06-19 NOTE — PROGRESS NOTES
HOME MONITORING REPORT    INR today:   Results for orders placed or performed in visit on 06/19/25   Protime-INR   Result Value Ref Range    INR 2.60        INR Goal: 2.0-3.0    Dosing Plan  As of 6/19/2025      TTR:  32.7% (7 y)   Full warfarin instructions:  5 mg every day                PLAN: Advised patient/caregiver to continue current dose and recheck in one week.  Patient/Caregiver voiced understanding      Electronically signed by Regina Curtis MD on 6/19/2025 at 1:24 PM

## 2025-06-26 ENCOUNTER — ANTI-COAG VISIT (OUTPATIENT)
Dept: PRIMARY CARE CLINIC | Age: 75
End: 2025-06-26
Payer: MEDICARE

## 2025-06-26 DIAGNOSIS — Z79.01 ANTICOAGULATED ON COUMADIN: Primary | ICD-10-CM

## 2025-06-26 LAB — INR BLD: 2.9

## 2025-06-26 PROCEDURE — 93793 ANTICOAG MGMT PT WARFARIN: CPT | Performed by: INTERNAL MEDICINE

## 2025-06-26 NOTE — PROGRESS NOTES
HOME MONITORING REPORT    INR today:   Results for orders placed or performed in visit on 06/26/25   Protime-INR   Result Value Ref Range    INR 2.90        INR Goal: 2.0-3.0    Dosing Plan  As of 6/26/2025      TTR:  32.9% (7 y)   Full warfarin instructions:  5 mg every day                PLAN: Patient aware to continue current dose and recheck in one week or as ordered.     Electronically signed by Regina Curtis MD on 6/26/2025 at 10:45 AM

## 2025-07-01 ASSESSMENT — ENCOUNTER SYMPTOMS
ABDOMINAL PAIN: 0
BLOOD IN STOOL: 0
CHOKING: 0
TROUBLE SWALLOWING: 0
DIARRHEA: 1
CONSTIPATION: 0
ANAL BLEEDING: 0
ALLERGIC/IMMUNOLOGIC NEGATIVE: 1
NAUSEA: 0
VOMITING: 0
EYES NEGATIVE: 1
RESPIRATORY NEGATIVE: 1

## 2025-07-06 DIAGNOSIS — Z91.09 ENVIRONMENTAL ALLERGIES: ICD-10-CM

## 2025-07-07 ENCOUNTER — TELEPHONE (OUTPATIENT)
Dept: GASTROENTEROLOGY | Age: 75
End: 2025-07-07

## 2025-07-07 ENCOUNTER — ANTI-COAG VISIT (OUTPATIENT)
Dept: PRIMARY CARE CLINIC | Age: 75
End: 2025-07-07
Payer: MEDICARE

## 2025-07-07 ENCOUNTER — CLINICAL SUPPORT (OUTPATIENT)
Dept: INTERNAL MEDICINE | Age: 75
End: 2025-07-07
Payer: MEDICARE

## 2025-07-07 DIAGNOSIS — E53.8 B12 DEFICIENCY: Primary | ICD-10-CM

## 2025-07-07 DIAGNOSIS — Z79.01 ANTICOAGULATED ON COUMADIN: Primary | ICD-10-CM

## 2025-07-07 DIAGNOSIS — K29.50 OTHER CHRONIC GASTRITIS WITHOUT HEMORRHAGE: ICD-10-CM

## 2025-07-07 DIAGNOSIS — K21.9 GASTROESOPHAGEAL REFLUX DISEASE WITHOUT ESOPHAGITIS: Primary | ICD-10-CM

## 2025-07-07 LAB — INTERNATIONAL NORMALIZATION RATIO, POC: 2.3

## 2025-07-07 PROCEDURE — 96372 THER/PROPH/DIAG INJ SC/IM: CPT | Performed by: INTERNAL MEDICINE

## 2025-07-07 PROCEDURE — 93793 ANTICOAG MGMT PT WARFARIN: CPT | Performed by: INTERNAL MEDICINE

## 2025-07-07 PROCEDURE — 85610 PROTHROMBIN TIME: CPT | Performed by: INTERNAL MEDICINE

## 2025-07-07 RX ORDER — FEXOFENADINE HCL 180 MG/1
180 TABLET ORAL DAILY
Qty: 90 TABLET | Refills: 1 | Status: SHIPPED | OUTPATIENT
Start: 2025-07-07

## 2025-07-07 RX ORDER — OMEPRAZOLE 40 MG/1
40 CAPSULE, DELAYED RELEASE ORAL 2 TIMES DAILY
Qty: 60 CAPSULE | Refills: 11 | Status: SHIPPED | OUTPATIENT
Start: 2025-07-07

## 2025-07-07 RX ORDER — CYANOCOBALAMIN 1000 UG/ML
1000 INJECTION, SOLUTION INTRAMUSCULAR; SUBCUTANEOUS ONCE
Status: COMPLETED | OUTPATIENT
Start: 2025-07-07 | End: 2025-07-07

## 2025-07-07 RX ADMIN — CYANOCOBALAMIN 1000 MCG: 1000 INJECTION, SOLUTION INTRAMUSCULAR; SUBCUTANEOUS at 10:53

## 2025-07-07 NOTE — PROGRESS NOTES
Ms. Leny Stone was here today.   INR today:   Results for orders placed or performed in visit on 07/07/25   POCT INR   Result Value Ref Range    INR,(POC) 2.3      INR Goal: 2.0-3.0    Dosing Plan  As of 7/7/2025      TTR:  33.2% (7 y)   Full warfarin instructions:  5 mg every day              PLAN: CONTINUE CURRENT DOSE, RECHECK AT HOME IN ONE WEEK.     Coumadin Clinic Hours  Monday           8:00am - 4:00pm  Tuesday 8:00am - 4:00pm  Wednesday  10:00am - 4:00pm  Thursday 8:00am - 4:00pm    IF IT'S AN EMERGENCY, PLEASE CALL 911 OR GO TO YOUR NEAREST EMERGENCY ROOM.    [facility] COUMADIN [facility] [phone] ([facility] DIRECT LINE)  IF UNABLE TO REACH COUMADIN CLINIC, PLEASE CALL YOUR DOCTOR,     INTERNAL  MEDICINE [phone].    PRIMARY CARE  [phone]    FAMILY MEDICINE  [phone]    Electronically signed by Jovany Curtis MD on 7/7/2025 at 11:05 AM

## 2025-07-07 NOTE — PROGRESS NOTES
After obtaining consent, and per orders of Dr. Curtis, injection of Vitamin B12  given in Left deltoid by Sonal Payne LPN.

## 2025-07-07 NOTE — TELEPHONE ENCOUNTER
07- Patient called complains that her Prilosec was changed to once daily which isn't controlling her reflux symptoms-burning in stomach and throat.     She would like to go back to taking it twice daily as that it worked better to control her symptoms     Routed to LT APRN

## 2025-07-08 NOTE — TELEPHONE ENCOUNTER
07- Called [facility] for the patient that her rx for Omeprazole bid has been sent to [facility]

## 2025-07-15 ENCOUNTER — ANTI-COAG VISIT (OUTPATIENT)
Dept: PRIMARY CARE CLINIC | Age: 75
End: 2025-07-15
Payer: MEDICARE

## 2025-07-15 DIAGNOSIS — Z79.01 ANTICOAGULATED ON COUMADIN: Primary | ICD-10-CM

## 2025-07-15 LAB — INR BLD: 3.1

## 2025-07-15 PROCEDURE — 93793 ANTICOAG MGMT PT WARFARIN: CPT | Performed by: INTERNAL MEDICINE

## 2025-07-15 NOTE — PROGRESS NOTES
HOME MONITORING REPORT    INR today:   Results for orders placed or performed in visit on 07/15/25   Protime-INR   Result Value Ref Range    INR 3.10        INR Goal: 2.0-3.0    Dosing Plan  As of 7/15/2025      TTR:  33.4% (7.1 y)   Full warfarin instructions:  7/15: 2.5 mg; Otherwise 5 mg every day                PLAN: Advised patient/caregiver to reduce her dose tonight to 2.5 mg, then continue current dose and recheck in one week.  Patient/Caregiver voiced understanding      Electronically signed by Regina Curtis MD on 7/15/2025 at 1:51 PM

## 2025-07-23 ENCOUNTER — ANTI-COAG VISIT (OUTPATIENT)
Dept: PRIMARY CARE CLINIC | Age: 75
End: 2025-07-23
Payer: MEDICARE

## 2025-07-23 DIAGNOSIS — Z79.01 ANTICOAGULATED ON COUMADIN: Primary | ICD-10-CM

## 2025-07-23 LAB — INR BLD: 1.4

## 2025-07-23 PROCEDURE — 93793 ANTICOAG MGMT PT WARFARIN: CPT | Performed by: INTERNAL MEDICINE

## 2025-07-23 NOTE — PROGRESS NOTES
HOME MONITORING REPORT    INR today:   Results for orders placed or performed in visit on 07/23/25   Protime-INR   Result Value Ref Range    INR 1.40        INR Goal: 2.0-3.0    Dosing Plan  As of 7/23/2025      TTR:  33.4% (7.1 y)   Full warfarin instructions:  7/23: 10 mg; Otherwise 5 mg every day           Patient missed two dose last week due to illness.     PLAN: Advised patient/caregiver to increase her dose tonight to 10 mg, then continue current dose and recheck in one week.  Patient/Caregiver voiced understanding      Electronically signed by eRgina Curtis MD on 7/23/2025 at 11:54 AM

## 2025-07-28 LAB — INR BLD: 1.6

## 2025-07-30 ENCOUNTER — OFFICE VISIT (OUTPATIENT)
Dept: NEUROLOGY | Age: 75
End: 2025-07-30
Payer: MEDICARE

## 2025-07-30 VITALS
DIASTOLIC BLOOD PRESSURE: 76 MMHG | SYSTOLIC BLOOD PRESSURE: 121 MMHG | HEART RATE: 82 BPM | WEIGHT: 234 LBS | HEIGHT: 67 IN | OXYGEN SATURATION: 97 % | BODY MASS INDEX: 36.73 KG/M2

## 2025-07-30 DIAGNOSIS — R41.3 MEMORY LOSS: Primary | ICD-10-CM

## 2025-07-30 DIAGNOSIS — R41.3 MEMORY LOSS: ICD-10-CM

## 2025-07-30 DIAGNOSIS — E55.9 VITAMIN D DEFICIENCY: ICD-10-CM

## 2025-07-30 DIAGNOSIS — R53.83 OTHER FATIGUE: ICD-10-CM

## 2025-07-30 LAB
25(OH)D3 SERPL-MCNC: 44.2 NG/ML
ALBUMIN SERPL-MCNC: 4.1 G/DL (ref 3.5–5.2)
ALP SERPL-CCNC: 68 U/L (ref 35–104)
ALT SERPL-CCNC: 19 U/L (ref 10–35)
ANION GAP SERPL CALCULATED.3IONS-SCNC: 10 MMOL/L (ref 8–16)
AST SERPL-CCNC: 29 U/L (ref 10–35)
BASOPHILS # BLD: 0 K/UL (ref 0–0.2)
BASOPHILS NFR BLD: 0.5 % (ref 0–1)
BILIRUB SERPL-MCNC: 0.3 MG/DL (ref 0.2–1.2)
BUN SERPL-MCNC: 27 MG/DL (ref 8–23)
CALCIUM SERPL-MCNC: 9.2 MG/DL (ref 8.8–10.2)
CHLORIDE SERPL-SCNC: 108 MMOL/L (ref 98–107)
CO2 SERPL-SCNC: 23 MMOL/L (ref 22–29)
CREAT SERPL-MCNC: 1.7 MG/DL (ref 0.5–0.9)
CRP SERPL-MCNC: <3 MG/L (ref 0–5)
EOSINOPHIL # BLD: 0.1 K/UL (ref 0–0.6)
EOSINOPHIL NFR BLD: 2.1 % (ref 0–5)
ERYTHROCYTE [DISTWIDTH] IN BLOOD BY AUTOMATED COUNT: 15.8 % (ref 11.5–14.5)
ERYTHROCYTE [SEDIMENTATION RATE] IN BLOOD BY WESTERGREN METHOD: 17 MM/HR (ref 0–25)
GLUCOSE SERPL-MCNC: 89 MG/DL (ref 70–99)
HCT VFR BLD AUTO: 34.1 % (ref 37–47)
HGB BLD-MCNC: 11.1 G/DL (ref 12–16)
HIV-1 P24 AG: NORMAL
HIV1+2 AB SERPLBLD QL IA.RAPID: NORMAL
IMM GRANULOCYTES # BLD: 0 K/UL
LYMPHOCYTES # BLD: 1.4 K/UL (ref 1.1–4.5)
LYMPHOCYTES NFR BLD: 31.8 % (ref 20–40)
MCH RBC QN AUTO: 30.3 PG (ref 27–31)
MCHC RBC AUTO-ENTMCNC: 32.6 G/DL (ref 33–37)
MCV RBC AUTO: 93.2 FL (ref 81–99)
MONOCYTES # BLD: 0.5 K/UL (ref 0–0.9)
MONOCYTES NFR BLD: 11.4 % (ref 0–10)
NEUTROPHILS # BLD: 2.4 K/UL (ref 1.5–7.5)
NEUTS SEG NFR BLD: 53.7 % (ref 50–65)
PLATELET # BLD AUTO: 159 K/UL (ref 130–400)
PMV BLD AUTO: 11.2 FL (ref 9.4–12.3)
POTASSIUM SERPL-SCNC: 4.5 MMOL/L (ref 3.5–5.1)
PROT SERPL-MCNC: 7.6 G/DL (ref 6.4–8.3)
RBC # BLD AUTO: 3.66 M/UL (ref 4.2–5.4)
SODIUM SERPL-SCNC: 141 MMOL/L (ref 136–145)
TSH SERPL DL<=0.005 MIU/L-ACNC: 3.27 UIU/ML (ref 0.27–4.2)
VIT B12 SERPL-MCNC: 1466 PG/ML (ref 232–1245)
WBC # BLD AUTO: 4.4 K/UL (ref 4.8–10.8)

## 2025-07-30 PROCEDURE — 99214 OFFICE O/P EST MOD 30 MIN: CPT | Performed by: NURSE PRACTITIONER

## 2025-07-30 PROCEDURE — 1036F TOBACCO NON-USER: CPT | Performed by: NURSE PRACTITIONER

## 2025-07-30 PROCEDURE — 1159F MED LIST DOCD IN RCRD: CPT | Performed by: NURSE PRACTITIONER

## 2025-07-30 PROCEDURE — 3078F DIAST BP <80 MM HG: CPT | Performed by: NURSE PRACTITIONER

## 2025-07-30 PROCEDURE — G8427 DOCREV CUR MEDS BY ELIG CLIN: HCPCS | Performed by: NURSE PRACTITIONER

## 2025-07-30 PROCEDURE — 1123F ACP DISCUSS/DSCN MKR DOCD: CPT | Performed by: NURSE PRACTITIONER

## 2025-07-30 PROCEDURE — G8417 CALC BMI ABV UP PARAM F/U: HCPCS | Performed by: NURSE PRACTITIONER

## 2025-07-30 PROCEDURE — 3074F SYST BP LT 130 MM HG: CPT | Performed by: NURSE PRACTITIONER

## 2025-07-30 PROCEDURE — G8399 PT W/DXA RESULTS DOCUMENT: HCPCS | Performed by: NURSE PRACTITIONER

## 2025-07-30 PROCEDURE — 1160F RVW MEDS BY RX/DR IN RCRD: CPT | Performed by: NURSE PRACTITIONER

## 2025-07-30 PROCEDURE — 1090F PRES/ABSN URINE INCON ASSESS: CPT | Performed by: NURSE PRACTITIONER

## 2025-07-30 NOTE — PROGRESS NOTES
[facility] Office Note      Patient:   Leny Stone  MR#:    [id number]  Account Number:                         YOB: 1950  Date of Evaluation:  7/30/2025  Time of Note:                          3:45 PM  Primary/Referring Physician:  Regina Curtis MD   Consulting Physician:  Monique Concepcion DNP, APRN    NEW PATIENT CONSULTATION    Chief Complaint   Patient presents with    New Patient    Memory Loss     C/o memory loss. 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      History of Present Illness  The patient presents for memory loss. She is alone at today's appointment, able to provide a good history.     Has noted memory concerns for a few months now. She was admitted in March 2025 for AMS that resolved within a few days. MRI negative for stroke, questioned if she had TIA. She resides with her daughter, who has observed instances of her repeating herself. She struggles with recalling names but does not believe these memory issues interfere with her daily activities such as bathing, dressing, or eating. She continues to work and occasionally finds herself forgetting to hang up the phone after the call has ended. She manages her medication independently, although she reports occasionally missing doses. She is using a pill organizer. Her driving is limited to local areas, and she reports no recent tickets, accidents, or instances of getting lost. Her daughter assists with financial matters. She has been experiencing balance issues and had a fall while taking out the garbage. She also reports occasional urinary incontinence. She does not experience any hand tremors. She is on long-term Coumadin therapy due to a history of blood clots, possible lupus anticoagulant syndrome and atrial fibrillation. 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     FAMILY HISTORY  - Mother: Memory loss or dementia       Past Medical History:

## 2025-07-31 LAB — RPR SER QL: NORMAL

## 2025-08-01 LAB
AL BETA40 PLAS-MCNC: 194.43 PG/ML
AL BETA42 PLAS-MCNC: 25.68 PG/ML
ARSENIC BLD-MCNC: <10 UG/L
BETA-AMYLOID 42/40 RATIO: 0.13
CADMIUM BLD-MCNC: <1 UG/L
LEAD BLD-MCNC: <2 UG/DL
MERCURY BLD-MCNC: <2.5 UG/L
VIT B1 BLD-MCNC: 72 NMOL/L (ref 70–180)

## 2025-08-04 LAB — P-TAU217: NEGATIVE

## 2025-08-05 ENCOUNTER — ANTI-COAG VISIT (OUTPATIENT)
Dept: PRIMARY CARE CLINIC | Age: 75
End: 2025-08-05
Payer: MEDICARE

## 2025-08-05 DIAGNOSIS — Z79.01 ANTICOAGULATED ON COUMADIN: Primary | ICD-10-CM

## 2025-08-05 LAB
APOE ALLELE E2+E3+E4 BLD/T: NORMAL
INR BLD: 1.6
SPECIMEN SOURCE: NORMAL

## 2025-08-05 PROCEDURE — 93793 ANTICOAG MGMT PT WARFARIN: CPT | Performed by: INTERNAL MEDICINE

## 2025-08-08 DIAGNOSIS — E55.9 VITAMIN D DEFICIENCY: ICD-10-CM

## 2025-08-08 DIAGNOSIS — E53.8 B12 DEFICIENCY: ICD-10-CM

## 2025-08-08 DIAGNOSIS — D50.9 IRON DEFICIENCY ANEMIA, UNSPECIFIED IRON DEFICIENCY ANEMIA TYPE: ICD-10-CM

## 2025-08-08 DIAGNOSIS — E11.21 TYPE II DIABETES MELLITUS WITH NEPHROPATHY (HCC): ICD-10-CM

## 2025-08-08 LAB
25(OH)D3 SERPL-MCNC: 37.8 NG/ML
ALBUMIN SERPL-MCNC: 4.1 G/DL (ref 3.5–5.2)
ALP SERPL-CCNC: 66 U/L (ref 35–104)
ALT SERPL-CCNC: 18 U/L (ref 10–35)
ANION GAP SERPL CALCULATED.3IONS-SCNC: 8 MMOL/L (ref 8–16)
AST SERPL-CCNC: 27 U/L (ref 10–35)
BASOPHILS # BLD: 0 K/UL (ref 0–0.2)
BASOPHILS NFR BLD: 0.4 % (ref 0–1)
BILIRUB SERPL-MCNC: 0.3 MG/DL (ref 0.2–1.2)
BUN SERPL-MCNC: 33 MG/DL (ref 8–23)
CALCIUM SERPL-MCNC: 9.3 MG/DL (ref 8.8–10.2)
CHLORIDE SERPL-SCNC: 107 MMOL/L (ref 98–107)
CHOLEST SERPL-MCNC: 201 MG/DL (ref 0–199)
CO2 SERPL-SCNC: 26 MMOL/L (ref 22–29)
CREAT SERPL-MCNC: 1.7 MG/DL (ref 0.5–0.9)
CREAT UR-MCNC: 104 MG/DL (ref 28–217)
EOSINOPHIL # BLD: 0.1 K/UL (ref 0–0.6)
EOSINOPHIL NFR BLD: 2 % (ref 0–5)
ERYTHROCYTE [DISTWIDTH] IN BLOOD BY AUTOMATED COUNT: 16.1 % (ref 11.5–14.5)
GLUCOSE SERPL-MCNC: 88 MG/DL (ref 70–99)
HBA1C MFR BLD: 6 % (ref 4–5.6)
HCT VFR BLD AUTO: 33 % (ref 37–47)
HDLC SERPL-MCNC: 104 MG/DL (ref 40–60)
HGB BLD-MCNC: 10.6 G/DL (ref 12–16)
IMM GRANULOCYTES # BLD: 0 K/UL
IRON SATN MFR SERPL: 23 % (ref 15–50)
IRON SERPL-MCNC: 67 UG/DL (ref 37–145)
LDLC SERPL CALC-MCNC: 82 MG/DL
LYMPHOCYTES # BLD: 2.2 K/UL (ref 1.1–4.5)
LYMPHOCYTES NFR BLD: 43.6 % (ref 20–40)
MCH RBC QN AUTO: 29.6 PG (ref 27–31)
MCHC RBC AUTO-ENTMCNC: 32.1 G/DL (ref 33–37)
MCV RBC AUTO: 92.2 FL (ref 81–99)
MICROALBUMIN UR-MCNC: <1.2 MG/DL (ref 0–1.99)
MICROALBUMIN/CREAT UR-RTO: NORMAL MG/G (ref 0–29)
MONOCYTES # BLD: 0.6 K/UL (ref 0–0.9)
MONOCYTES NFR BLD: 11.4 % (ref 0–10)
NEUTROPHILS # BLD: 2.1 K/UL (ref 1.5–7.5)
NEUTS SEG NFR BLD: 42.2 % (ref 50–65)
PLATELET # BLD AUTO: 168 K/UL (ref 130–400)
PMV BLD AUTO: 11.7 FL (ref 9.4–12.3)
POTASSIUM SERPL-SCNC: 4.8 MMOL/L (ref 3.5–5.1)
PROT SERPL-MCNC: 7.4 G/DL (ref 6.4–8.3)
RBC # BLD AUTO: 3.58 M/UL (ref 4.2–5.4)
SODIUM SERPL-SCNC: 141 MMOL/L (ref 136–145)
TIBC SERPL-MCNC: 295 UG/DL (ref 250–400)
TRIGL SERPL-MCNC: 77 MG/DL (ref 0–149)
TSH SERPL DL<=0.005 MIU/L-ACNC: 3.12 UIU/ML (ref 0.27–4.2)
VIT B12 SERPL-MCNC: 1117 PG/ML (ref 232–1245)
WBC # BLD AUTO: 5 K/UL (ref 4.8–10.8)

## 2025-08-11 ENCOUNTER — OFFICE VISIT (OUTPATIENT)
Dept: INTERNAL MEDICINE | Age: 75
End: 2025-08-11
Payer: MEDICARE

## 2025-08-11 DIAGNOSIS — E78.5 HYPERLIPIDEMIA, UNSPECIFIED HYPERLIPIDEMIA TYPE: ICD-10-CM

## 2025-08-11 DIAGNOSIS — E11.21 TYPE II DIABETES MELLITUS WITH NEPHROPATHY (HCC): Primary | ICD-10-CM

## 2025-08-11 DIAGNOSIS — E55.9 VITAMIN D DEFICIENCY: ICD-10-CM

## 2025-08-11 DIAGNOSIS — R30.0 DYSURIA: ICD-10-CM

## 2025-08-11 DIAGNOSIS — Z91.09 ENVIRONMENTAL ALLERGIES: ICD-10-CM

## 2025-08-11 DIAGNOSIS — N18.32 STAGE 3B CHRONIC KIDNEY DISEASE (HCC): ICD-10-CM

## 2025-08-11 DIAGNOSIS — K21.9 GASTROESOPHAGEAL REFLUX DISEASE WITHOUT ESOPHAGITIS: ICD-10-CM

## 2025-08-11 DIAGNOSIS — K29.70 GASTRITIS WITHOUT BLEEDING, UNSPECIFIED CHRONICITY, UNSPECIFIED GASTRITIS TYPE: ICD-10-CM

## 2025-08-11 DIAGNOSIS — Z79.01 ANTICOAGULATED: ICD-10-CM

## 2025-08-11 DIAGNOSIS — I50.9 CONGESTIVE HEART FAILURE, UNSPECIFIED HF CHRONICITY, UNSPECIFIED HEART FAILURE TYPE (HCC): ICD-10-CM

## 2025-08-11 DIAGNOSIS — F32.89 OTHER DEPRESSION: ICD-10-CM

## 2025-08-11 DIAGNOSIS — D50.9 IRON DEFICIENCY ANEMIA, UNSPECIFIED IRON DEFICIENCY ANEMIA TYPE: ICD-10-CM

## 2025-08-11 DIAGNOSIS — I10 PRIMARY HYPERTENSION: ICD-10-CM

## 2025-08-11 DIAGNOSIS — E53.8 B12 DEFICIENCY: ICD-10-CM

## 2025-08-11 DIAGNOSIS — I48.91 ATRIAL FIBRILLATION WITH RAPID VENTRICULAR RESPONSE (HCC): ICD-10-CM

## 2025-08-11 DIAGNOSIS — R35.0 URINARY FREQUENCY: ICD-10-CM

## 2025-08-11 DIAGNOSIS — J44.9 CHRONIC OBSTRUCTIVE PULMONARY DISEASE, UNSPECIFIED COPD TYPE (HCC): ICD-10-CM

## 2025-08-11 DIAGNOSIS — E03.9 HYPOTHYROIDISM, UNSPECIFIED TYPE: ICD-10-CM

## 2025-08-11 DIAGNOSIS — G47.30 SLEEP APNEA, UNSPECIFIED TYPE: ICD-10-CM

## 2025-08-11 LAB
BACTERIA URNS QL MICRO: NORMAL /HPF
BILIRUB UR QL STRIP: NEGATIVE
CLARITY UR: CLEAR
COLOR UR: YELLOW
CRYSTALS URNS MICRO: NORMAL /HPF
EPI CELLS #/AREA URNS AUTO: 3 /HPF (ref 0–5)
GLUCOSE UR STRIP.AUTO-MCNC: NEGATIVE MG/DL
HGB UR STRIP.AUTO-MCNC: NEGATIVE MG/L
HYALINE CASTS #/AREA URNS AUTO: 0 /HPF (ref 0–8)
KETONES UR STRIP.AUTO-MCNC: NEGATIVE MG/DL
LEUKOCYTE ESTERASE UR QL STRIP.AUTO: ABNORMAL
NITRITE UR QL STRIP.AUTO: NEGATIVE
PH UR STRIP.AUTO: 6.5 [PH] (ref 5–8)
PROT UR STRIP.AUTO-MCNC: NEGATIVE MG/DL
RBC #/AREA URNS AUTO: 1 /HPF (ref 0–4)
SP GR UR STRIP.AUTO: 1.01 (ref 1–1.03)
UROBILINOGEN UR STRIP.AUTO-MCNC: 1 E.U./DL
WBC #/AREA URNS AUTO: 1 /HPF (ref 0–5)

## 2025-08-11 PROCEDURE — 1159F MED LIST DOCD IN RCRD: CPT | Performed by: INTERNAL MEDICINE

## 2025-08-11 PROCEDURE — 1036F TOBACCO NON-USER: CPT | Performed by: INTERNAL MEDICINE

## 2025-08-11 PROCEDURE — G8427 DOCREV CUR MEDS BY ELIG CLIN: HCPCS | Performed by: INTERNAL MEDICINE

## 2025-08-11 PROCEDURE — 96372 THER/PROPH/DIAG INJ SC/IM: CPT | Performed by: INTERNAL MEDICINE

## 2025-08-11 PROCEDURE — 3044F HG A1C LEVEL LT 7.0%: CPT | Performed by: INTERNAL MEDICINE

## 2025-08-11 PROCEDURE — G8417 CALC BMI ABV UP PARAM F/U: HCPCS | Performed by: INTERNAL MEDICINE

## 2025-08-11 PROCEDURE — 1123F ACP DISCUSS/DSCN MKR DOCD: CPT | Performed by: INTERNAL MEDICINE

## 2025-08-11 PROCEDURE — G8399 PT W/DXA RESULTS DOCUMENT: HCPCS | Performed by: INTERNAL MEDICINE

## 2025-08-11 PROCEDURE — 1090F PRES/ABSN URINE INCON ASSESS: CPT | Performed by: INTERNAL MEDICINE

## 2025-08-11 PROCEDURE — 99214 OFFICE O/P EST MOD 30 MIN: CPT | Performed by: INTERNAL MEDICINE

## 2025-08-11 PROCEDURE — 3077F SYST BP >= 140 MM HG: CPT | Performed by: INTERNAL MEDICINE

## 2025-08-11 PROCEDURE — 3023F SPIROM DOC REV: CPT | Performed by: INTERNAL MEDICINE

## 2025-08-11 PROCEDURE — 3080F DIAST BP >= 90 MM HG: CPT | Performed by: INTERNAL MEDICINE

## 2025-08-11 RX ORDER — CYANOCOBALAMIN 1000 UG/ML
1000 INJECTION, SOLUTION INTRAMUSCULAR; SUBCUTANEOUS ONCE
Status: COMPLETED | OUTPATIENT
Start: 2025-08-11 | End: 2025-08-11

## 2025-08-11 RX ADMIN — CYANOCOBALAMIN 1000 MCG: 1000 INJECTION, SOLUTION INTRAMUSCULAR; SUBCUTANEOUS at 11:32

## 2025-08-12 VITALS
OXYGEN SATURATION: 97 % | SYSTOLIC BLOOD PRESSURE: 152 MMHG | WEIGHT: 239.6 LBS | BODY MASS INDEX: 37.61 KG/M2 | DIASTOLIC BLOOD PRESSURE: 101 MMHG | HEART RATE: 111 BPM | HEIGHT: 67 IN

## 2025-08-12 RX ORDER — OXYBUTYNIN CHLORIDE 10 MG/1
10 TABLET, EXTENDED RELEASE ORAL DAILY
Qty: 30 TABLET | Refills: 3 | Status: SHIPPED | OUTPATIENT
Start: 2025-08-12

## 2025-08-12 ASSESSMENT — ENCOUNTER SYMPTOMS
DIARRHEA: 0
APNEA: 0
BLOOD IN STOOL: 0
RHINORRHEA: 0
WHEEZING: 0
SINUS PRESSURE: 0
COUGH: 0
NAUSEA: 0
ABDOMINAL DISTENTION: 0
EYE DISCHARGE: 0
STRIDOR: 0
EYE ITCHING: 0
CHEST TIGHTNESS: 0
SHORTNESS OF BREATH: 0
TROUBLE SWALLOWING: 0
SINUS PAIN: 0
COLOR CHANGE: 0
CONSTIPATION: 0
BACK PAIN: 0
VOMITING: 0
ABDOMINAL PAIN: 0
VOICE CHANGE: 0

## 2025-08-14 ENCOUNTER — ANTI-COAG VISIT (OUTPATIENT)
Dept: PRIMARY CARE CLINIC | Age: 75
End: 2025-08-14
Payer: MEDICARE

## 2025-08-14 DIAGNOSIS — Z79.01 ANTICOAGULATED ON COUMADIN: Primary | ICD-10-CM

## 2025-08-14 LAB — INR BLD: 2

## 2025-08-14 PROCEDURE — 93793 ANTICOAG MGMT PT WARFARIN: CPT | Performed by: INTERNAL MEDICINE

## 2025-08-22 ENCOUNTER — ANTI-COAG VISIT (OUTPATIENT)
Dept: PRIMARY CARE CLINIC | Age: 75
End: 2025-08-22
Payer: MEDICARE

## 2025-08-22 ENCOUNTER — OFFICE VISIT (OUTPATIENT)
Dept: INTERNAL MEDICINE | Age: 75
End: 2025-08-22
Payer: MEDICARE

## 2025-08-22 VITALS
BODY MASS INDEX: 37.53 KG/M2 | DIASTOLIC BLOOD PRESSURE: 94 MMHG | HEART RATE: 76 BPM | HEIGHT: 67 IN | SYSTOLIC BLOOD PRESSURE: 150 MMHG | OXYGEN SATURATION: 97 %

## 2025-08-22 DIAGNOSIS — B96.89 ACUTE BACTERIAL SINUSITIS: ICD-10-CM

## 2025-08-22 DIAGNOSIS — K12.1 STOMATITIS: Primary | ICD-10-CM

## 2025-08-22 DIAGNOSIS — H66.92 LEFT OTITIS MEDIA, UNSPECIFIED OTITIS MEDIA TYPE: ICD-10-CM

## 2025-08-22 DIAGNOSIS — Z79.01 ANTICOAGULATED ON COUMADIN: Primary | ICD-10-CM

## 2025-08-22 DIAGNOSIS — I10 PRIMARY HYPERTENSION: ICD-10-CM

## 2025-08-22 DIAGNOSIS — J01.90 ACUTE BACTERIAL SINUSITIS: ICD-10-CM

## 2025-08-22 LAB — INTERNATIONAL NORMALIZATION RATIO, POC: 2.3

## 2025-08-22 PROCEDURE — G8417 CALC BMI ABV UP PARAM F/U: HCPCS | Performed by: INTERNAL MEDICINE

## 2025-08-22 PROCEDURE — 3080F DIAST BP >= 90 MM HG: CPT | Performed by: INTERNAL MEDICINE

## 2025-08-22 PROCEDURE — G8427 DOCREV CUR MEDS BY ELIG CLIN: HCPCS | Performed by: INTERNAL MEDICINE

## 2025-08-22 PROCEDURE — 1123F ACP DISCUSS/DSCN MKR DOCD: CPT | Performed by: INTERNAL MEDICINE

## 2025-08-22 PROCEDURE — 3077F SYST BP >= 140 MM HG: CPT | Performed by: INTERNAL MEDICINE

## 2025-08-22 PROCEDURE — 85610 PROTHROMBIN TIME: CPT | Performed by: INTERNAL MEDICINE

## 2025-08-22 PROCEDURE — 1159F MED LIST DOCD IN RCRD: CPT | Performed by: INTERNAL MEDICINE

## 2025-08-22 PROCEDURE — 99214 OFFICE O/P EST MOD 30 MIN: CPT | Performed by: INTERNAL MEDICINE

## 2025-08-22 PROCEDURE — G8399 PT W/DXA RESULTS DOCUMENT: HCPCS | Performed by: INTERNAL MEDICINE

## 2025-08-22 PROCEDURE — 1036F TOBACCO NON-USER: CPT | Performed by: INTERNAL MEDICINE

## 2025-08-22 PROCEDURE — 1090F PRES/ABSN URINE INCON ASSESS: CPT | Performed by: INTERNAL MEDICINE

## 2025-08-22 RX ORDER — ACYCLOVIR 800 MG/1
800 TABLET ORAL 2 TIMES DAILY
Qty: 20 TABLET | Refills: 0 | Status: SHIPPED | OUTPATIENT
Start: 2025-08-22 | End: 2025-09-01

## 2025-08-22 ASSESSMENT — ENCOUNTER SYMPTOMS
SHORTNESS OF BREATH: 0
WHEEZING: 0
ABDOMINAL DISTENTION: 0
DIARRHEA: 0
VOICE CHANGE: 0
RHINORRHEA: 0
COUGH: 0
EYE ITCHING: 0
BACK PAIN: 0
APNEA: 0
TROUBLE SWALLOWING: 0
STRIDOR: 0
EYE DISCHARGE: 0
VOMITING: 0
NAUSEA: 0
COLOR CHANGE: 0
CHEST TIGHTNESS: 0
CONSTIPATION: 0
ABDOMINAL PAIN: 0
BLOOD IN STOOL: 0
SINUS PRESSURE: 0
SINUS PAIN: 0

## 2025-08-28 ENCOUNTER — ANTI-COAG VISIT (OUTPATIENT)
Dept: PRIMARY CARE CLINIC | Age: 75
End: 2025-08-28
Payer: MEDICARE

## 2025-08-28 DIAGNOSIS — Z79.01 ANTICOAGULATED ON COUMADIN: Primary | ICD-10-CM

## 2025-08-28 LAB — INR BLD: 2.2

## 2025-08-28 PROCEDURE — 93793 ANTICOAG MGMT PT WARFARIN: CPT | Performed by: INTERNAL MEDICINE

## 2025-09-02 ENCOUNTER — TELEPHONE (OUTPATIENT)
Dept: CARDIOLOGY CLINIC | Age: 75
End: 2025-09-02

## 2025-09-03 ENCOUNTER — TELEPHONE (OUTPATIENT)
Dept: HEMATOLOGY | Age: 75
End: 2025-09-03

## 2025-09-05 ENCOUNTER — ANTI-COAG VISIT (OUTPATIENT)
Dept: PRIMARY CARE CLINIC | Age: 75
End: 2025-09-05
Payer: MEDICARE

## 2025-09-05 DIAGNOSIS — Z79.01 ANTICOAGULATED ON COUMADIN: Primary | ICD-10-CM

## 2025-09-05 LAB — INR BLD: 2.6

## 2025-09-05 PROCEDURE — 93793 ANTICOAG MGMT PT WARFARIN: CPT | Performed by: INTERNAL MEDICINE

## (undated) DEVICE — GLOVE SURG SZ 8 L12IN FNGR THK13MIL BRN LTX SYN POLYMER W

## (undated) DEVICE — SOL NS 500ML

## (undated) DEVICE — FORCEPS BX L240CM JAW DIA2.8MM L CAP W/ NDL MIC MESH TOOTH

## (undated) DEVICE — SYSTEM SKIN CLSR 22CM DERMBND PRINEO

## (undated) DEVICE — WRAP AROUND LENS-STERLIE

## (undated) DEVICE — SUTURE VCRL SZ 1 L18IN ABSRB UD L36MM CT-1 1/2 CIR J841D

## (undated) DEVICE — SOL IRR NACL 0.9PCT BT 1000ML

## (undated) DEVICE — SOLUTION IV 250ML 0.9% SOD CHL PH 5 INJ USP VIAFLX PLAS

## (undated) DEVICE — Z INACTIVE USE 2660664 SOLUTION IRRIG 3000ML 0.9% SOD CHL USP UROMATIC PLAS CONT

## (undated) DEVICE — APPL CHLORAPREP W/TINT 26ML ORNG

## (undated) DEVICE — Device: Brand: POWER-FLO®

## (undated) DEVICE — SURGICAL PROCEDURE PACK KNEE TOT DBD CDS LOURDES HOSP LF

## (undated) DEVICE — SEALANT HEMSTAT 5ML HUM FIBRIN THROM 2 VI APPL DEV EVICEL

## (undated) DEVICE — THREE QUARTER SHEET: Brand: CONVERTORS

## (undated) DEVICE — SKIN AFFIX SURG ADHESIVE 72/CS 0.55ML: Brand: MEDLINE

## (undated) DEVICE — ENDO KIT,LOURDES HOSPITAL: Brand: MEDLINE INDUSTRIES, INC.

## (undated) DEVICE — 3M™ STERI-DRAPE™ INSTRUMENT POUCH 1018: Brand: STERI-DRAPE™

## (undated) DEVICE — IMPLANTABLE DEVICE
Type: IMPLANTABLE DEVICE | Site: KNEE | Status: NON-FUNCTIONAL
Removed: 2018-03-26

## (undated) DEVICE — SOLUTION IV IRRIG POUR BRL 0.9% SODIUM CHL 2F7124

## (undated) DEVICE — SUTURE VCRL SZ 2-0 L27IN ABSRB UD L26MM SH 1/2 CIR J417H

## (undated) DEVICE — 3M™ IOBAN™ 2 ANTIMICROBIAL INCISE DRAPE 6650EZ: Brand: IOBAN™ 2

## (undated) DEVICE — SUTURE VCRL SZ 2-0 L36IN ABSRB UD L36MM CT-1 1/2 CIR J945H

## (undated) DEVICE — STERILE POLYISOPRENE POWDER-FREE SURGICAL GLOVES: Brand: PROTEXIS

## (undated) DEVICE — ELECTRD PAD DEFIB A/

## (undated) DEVICE — NON-WOVEN ADHESIVE WOUND DRESSING: Brand: PRIMAPORE ADHESIVE DRESSING 30*10CM

## (undated) DEVICE — ARM BOARD PAD: Brand: DEVON

## (undated) DEVICE — LARGE BONE HALL BLADE, RECIPROCATOR, 12.5 X 76 X 1.27 MM: Brand: HALL

## (undated) DEVICE — GLOVE SURG SZ 8.5 L11.6IN FNGR THK12.6MIL CUF THK8.3MIL BRN

## (undated) DEVICE — MCLASS OSCILLATING SAW BLADE 19 X 1.27 (0.050") X 90 MM: Brand: MCLASS

## (undated) DEVICE — PK PM 30

## (undated) DEVICE — GOWN,PRECEPT,XLNG/XXLARGE,STRL: Brand: MEDLINE

## (undated) DEVICE — FORCEPS BX L240CM JAW DIA2.4MM ORNG L CAP W/ NDL DISP RAD

## (undated) DEVICE — GLOVE SURG SZ 75 L12IN FNGR THK87MIL DK GRN LTX FREE ISOLEX

## (undated) DEVICE — ZIMMER® STERILE DISPOSABLE TOURNIQUET CUFF WITH PLC, DUAL PORT, SINGLE BLADDER, 34 IN. (86 CM)